# Patient Record
Sex: FEMALE | Race: WHITE | NOT HISPANIC OR LATINO | Employment: UNEMPLOYED | URBAN - METROPOLITAN AREA
[De-identification: names, ages, dates, MRNs, and addresses within clinical notes are randomized per-mention and may not be internally consistent; named-entity substitution may affect disease eponyms.]

---

## 2017-01-12 ENCOUNTER — OFFICE VISIT (OUTPATIENT)
Dept: FAMILY MEDICINE | Facility: CLINIC | Age: 62
End: 2017-01-12
Payer: COMMERCIAL

## 2017-01-12 VITALS
HEART RATE: 91 BPM | SYSTOLIC BLOOD PRESSURE: 120 MMHG | HEIGHT: 63 IN | RESPIRATION RATE: 18 BRPM | TEMPERATURE: 97.6 F | WEIGHT: 204 LBS | DIASTOLIC BLOOD PRESSURE: 78 MMHG | BODY MASS INDEX: 36.14 KG/M2 | OXYGEN SATURATION: 96 %

## 2017-01-12 DIAGNOSIS — I83.90 VARICOSE VEIN OF LEG: ICD-10-CM

## 2017-01-12 DIAGNOSIS — N39.3 FEMALE STRESS INCONTINENCE: ICD-10-CM

## 2017-01-12 DIAGNOSIS — N39.0 URINARY TRACT INFECTION WITHOUT HEMATURIA, SITE UNSPECIFIED: Primary | ICD-10-CM

## 2017-01-12 LAB
ALBUMIN UR-MCNC: NEGATIVE MG/DL
APPEARANCE UR: CLEAR
BACTERIA #/AREA URNS HPF: ABNORMAL /HPF
BILIRUB UR QL STRIP: NEGATIVE
COLOR UR AUTO: YELLOW
GLUCOSE UR STRIP-MCNC: NEGATIVE MG/DL
HGB UR QL STRIP: ABNORMAL
KETONES UR STRIP-MCNC: NEGATIVE MG/DL
LEUKOCYTE ESTERASE UR QL STRIP: ABNORMAL
MUCOUS THREADS #/AREA URNS LPF: PRESENT /LPF
NITRATE UR QL: NEGATIVE
NON-SQ EPI CELLS #/AREA URNS LPF: ABNORMAL /LPF
PH UR STRIP: 6.5 PH (ref 5–7)
RBC #/AREA URNS AUTO: ABNORMAL /HPF (ref 0–2)
SP GR UR STRIP: 1.02 (ref 1–1.03)
URN SPEC COLLECT METH UR: ABNORMAL
UROBILINOGEN UR STRIP-ACNC: 0.2 EU/DL (ref 0.2–1)
WBC #/AREA URNS AUTO: ABNORMAL /HPF (ref 0–2)

## 2017-01-12 PROCEDURE — 87086 URINE CULTURE/COLONY COUNT: CPT | Performed by: NURSE PRACTITIONER

## 2017-01-12 PROCEDURE — 81001 URINALYSIS AUTO W/SCOPE: CPT | Performed by: NURSE PRACTITIONER

## 2017-01-12 PROCEDURE — 87088 URINE BACTERIA CULTURE: CPT | Performed by: NURSE PRACTITIONER

## 2017-01-12 PROCEDURE — 99214 OFFICE O/P EST MOD 30 MIN: CPT | Performed by: NURSE PRACTITIONER

## 2017-01-12 PROCEDURE — 87186 SC STD MICRODIL/AGAR DIL: CPT | Performed by: NURSE PRACTITIONER

## 2017-01-12 RX ORDER — NITROFURANTOIN 25; 75 MG/1; MG/1
100 CAPSULE ORAL 2 TIMES DAILY
Qty: 14 CAPSULE | Refills: 0 | Status: SHIPPED | OUTPATIENT
Start: 2017-01-12 | End: 2017-04-11

## 2017-01-12 NOTE — NURSING NOTE
"Chief Complaint   Patient presents with     UTI     Skin Check     bump on calve and left calve swelling        Initial /88 mmHg  Pulse 91  Temp(Src) 97.6  F (36.4  C) (Oral)  Resp 18  Ht 5' 2.75\" (1.594 m)  Wt 204 lb (92.534 kg)  BMI 36.42 kg/m2  SpO2 96% Estimated body mass index is 36.42 kg/(m^2) as calculated from the following:    Height as of this encounter: 5' 2.75\" (1.594 m).    Weight as of this encounter: 204 lb (92.534 kg).  BP completed using cuff size: regular    Shannon Tello MA      "

## 2017-01-12 NOTE — PROGRESS NOTES
"  SUBJECTIVE:                                                    Hafsa Corona is a 61 year old female who presents to clinic today for the following health issues:      URINARY TRACT SYMPTOMS      Duration: ongoing     Description   pt felt she was on the verge of having a UTI in December. January 1-2 very intense: frequency, blood in urine, dysuria, incontinence. Pt self treated with emergency-C cranberry and hot water compress. Pt was also feeling very fatigue. Symptoms cleared up on Tuesday January 3 rd. A week later symptoms came back on Sunday 8 th. She self treated and she \"was fine\".     Intensity:  severe    Accompanying signs and symptoms:  Fever/chills: no   Flank pain YES- somewhat. Not sure if related   Nausea and vomiting: no, mild appetite decrease   Vaginal symptoms: none  Abdominal/Pelvic Pain: YES, urethra had burning sensation. Pt stopped wearing tight fitting pants.      History  History of frequent UTI's: YES- when she was young   History of kidney stones: no   Sexually Active: no   Possibility of pregnancy: No    Precipitating or alleviating factors: None    Therapies tried and outcome: Emergency-C Cranberry/polmigranete and warm water   Outcome: helped      She currently is not having any urinary symptoms with the exception of stress incontinence which is chronic.  It did worse recently when she was experiencing dysuria.    She has intermittent mild swelling in the area of varicose veins on her left lower leg.  She denies any pain.        Problem list and histories reviewed & adjusted, as indicated.  Additional history: as documented    Problem list, Medication list, Allergies, and Medical/Social/Surgical histories reviewed in [facility] and updated as appropriate.    ROS:  C: NEGATIVE for fever, chills, change in weight  E/M: NEGATIVE for ear, mouth and throat problems  R: NEGATIVE for significant cough or SOB  CV: NEGATIVE for chest pain, palpitations or peripheral edema  GI: NEGATIVE for nausea, " "abdominal pain, heartburn, or change in bowel habits  : see HPI  MUSCULOSKELETAL: NEGATIVE for significant arthralgias or myalgia    OBJECTIVE:                                                    /78 mmHg  Pulse 91  Temp(Src) 97.6  F (36.4  C) (Oral)  Resp 18  Ht 5' 2.75\" (1.594 m)  Wt 204 lb (92.534 kg)  BMI 36.42 kg/m2  SpO2 96%  Body mass index is 36.42 kg/(m^2).  GENERAL: healthy, alert and no distress  RESP: lungs clear to auscultation - no rales, rhonchi or wheezes  CV: regular rate and rhythm, normal S1 S2, no S3 or S4, no murmur, click or rub, no peripheral edema and peripheral pulses strong  MS: varicose veins left medial lower leg  SKIN: no suspicious lesions or rashes  NEURO: Normal strength and tone, mentation intact and speech normal    Diagnostic Test Results:  Results for orders placed or performed in visit on 01/12/17 (from the past 24 hour(s))   *UA reflex to Microscopic and Culture ([facility] (except [place] and [place])   Result Value Ref Range    Color Urine Yellow     Appearance Urine Clear     Glucose Urine Negative NEG mg/dL    Bilirubin Urine Negative NEG    Ketones Urine Negative NEG mg/dL    Specific Gravity Urine 1.025 1.003 - 1.035    Blood Urine Small (A) NEG    pH Urine 6.5 5.0 - 7.0 pH    Protein Albumin Urine Negative NEG mg/dL    Urobilinogen Urine 0.2 0.2 - 1.0 EU/dL    Nitrite Urine Negative NEG    Leukocyte Esterase Urine Moderate (A) NEG    Source Midstream Urine    Urine Microscopic   Result Value Ref Range    WBC Urine 25-50 (A) 0 - 2 /HPF    RBC Urine O - 2 0 - 2 /HPF    Squamous Epithelial /LPF Urine Moderate (A) FEW /LPF    Bacteria Urine Moderate (A) NEG /HPF    Mucous Urine Present (A) NEG /LPF        ASSESSMENT/PLAN:                                                            1. Urinary tract infection without hematuria, site unspecified  Will treat for UTI.  She would like to recheck, so will make a lab-only appointment in 2 " weeks for repeat UA.   - *UA reflex to Microscopic and Culture ([facility] (except [place] and [place])  - Urine Microscopic  - Urine Culture Aerobic Bacterial  - nitrofurantoin, macrocrystal-monohydrate, (MACROBID) 100 MG capsule; Take 1 capsule (100 mg) by mouth 2 times daily  Dispense: 14 capsule; Refill: 0  - *UA reflex to Microscopic; Future    2. Female stress incontinence  She is interested in treating this, will [facility] to [facility].     3. Varicose vein of leg  Reassured.  Discussed compression stockings.       Follow up in March for diabetes.     Morelia Ayon, ATTILA  [facility]

## 2017-01-17 LAB
BACTERIA SPEC CULT: ABNORMAL
MICRO REPORT STATUS: ABNORMAL
MICROORGANISM SPEC CULT: ABNORMAL
SPECIMEN SOURCE: ABNORMAL

## 2017-01-23 DIAGNOSIS — F32.9 MDD (MAJOR DEPRESSIVE DISORDER): Primary | ICD-10-CM

## 2017-01-23 DIAGNOSIS — N39.0 URINARY TRACT INFECTION WITHOUT HEMATURIA, SITE UNSPECIFIED: ICD-10-CM

## 2017-01-23 LAB
ALBUMIN UR-MCNC: NEGATIVE MG/DL
APPEARANCE UR: CLEAR
BILIRUB UR QL STRIP: NEGATIVE
COLOR UR AUTO: YELLOW
GLUCOSE UR STRIP-MCNC: NEGATIVE MG/DL
HGB UR QL STRIP: ABNORMAL
KETONES UR STRIP-MCNC: NEGATIVE MG/DL
LEUKOCYTE ESTERASE UR QL STRIP: NEGATIVE
NITRATE UR QL: NEGATIVE
NON-SQ EPI CELLS #/AREA URNS LPF: NORMAL /LPF
PH UR STRIP: 6.5 PH (ref 5–7)
RBC #/AREA URNS AUTO: NORMAL /HPF (ref 0–2)
SP GR UR STRIP: 1.02 (ref 1–1.03)
URN SPEC COLLECT METH UR: ABNORMAL
UROBILINOGEN UR STRIP-ACNC: 0.2 EU/DL (ref 0.2–1)
WBC #/AREA URNS AUTO: NORMAL /HPF (ref 0–2)

## 2017-01-23 PROCEDURE — 81001 URINALYSIS AUTO W/SCOPE: CPT | Performed by: NURSE PRACTITIONER

## 2017-01-23 PROCEDURE — 36415 COLL VENOUS BLD VENIPUNCTURE: CPT | Performed by: FAMILY MEDICINE

## 2017-01-23 PROCEDURE — 82306 VITAMIN D 25 HYDROXY: CPT | Performed by: FAMILY MEDICINE

## 2017-01-24 LAB — DEPRECATED CALCIDIOL+CALCIFEROL SERPL-MC: 55 UG/L (ref 20–75)

## 2017-02-09 DIAGNOSIS — I10 ESSENTIAL HYPERTENSION: Primary | ICD-10-CM

## 2017-02-10 RX ORDER — LISINOPRIL 10 MG/1
10 TABLET ORAL DAILY
Qty: 90 TABLET | Refills: 0 | Status: SHIPPED | OUTPATIENT
Start: 2017-02-10 | End: 2017-04-11

## 2017-02-27 DIAGNOSIS — E11.9 TYPE 2 DIABETES MELLITUS WITHOUT COMPLICATION (H): ICD-10-CM

## 2017-02-27 NOTE — TELEPHONE ENCOUNTER
METFORMIN 500 MG         Last Written Prescription Date: 2-4-16  Last Fill Quantity: 180, # refills: PRN  Last Office Visit with [facility], [facility] or [facility] prescribing provider:  1-12-17        BP Readings from Last 3 Encounters:   01/12/17 120/78   09/06/16 110/77   07/19/16 128/72     Lab Results   Component Value Date    MICROL 19 09/06/2016     No results found for: MICROALBUMIN  Creatinine   Date Value Ref Range Status   02/04/2016 0.76 0.52 - 1.04 mg/dL Final   ]  GFR Estimate   Date Value Ref Range Status   02/04/2016 77 >60 mL/min/1.7m2 Final     Comment:     Non  GFR Calc   06/01/2015 89 >60 mL/min/1.7m2 Final     Comment:     Non  GFR Calc   02/13/2014 >60 ml/min/1.73m2 Final     GFR Estimate If Black   Date Value Ref Range Status   02/04/2016 >90   GFR Calc   >60 mL/min/1.7m2 Final   06/01/2015 >90   GFR Calc   >60 mL/min/1.7m2 Final   02/13/2014 >60 ml/min/1.73m2 Final     Lab Results   Component Value Date    CHOL 169 09/06/2016     Lab Results   Component Value Date    HDL 59 09/06/2016     Lab Results   Component Value Date    LDL 78 09/06/2016     Lab Results   Component Value Date    TRIG 159 09/06/2016     Lab Results   Component Value Date    CHOLHDLRATIO 2.6 06/01/2015     Lab Results   Component Value Date    AST 20 02/04/2016     Lab Results   Component Value Date    ALT 16 02/04/2016     Lab Results   Component Value Date    A1C 6.2 09/06/2016    A1C 6.0 02/04/2016    A1C 6.2 06/01/2015    A1C 5.7 04/01/2013    A1C 5.9 07/13/2012     Potassium   Date Value Ref Range Status   02/04/2016 4.2 3.4 - 5.3 mmol/L Final

## 2017-02-28 RX ORDER — METFORMIN HCL 500 MG
TABLET, EXTENDED RELEASE 24 HR ORAL
Qty: 60 TABLET | Refills: 0 | Status: SHIPPED | OUTPATIENT
Start: 2017-02-28 | End: 2017-04-03

## 2017-03-01 RX ORDER — METFORMIN HCL 500 MG
TABLET, EXTENDED RELEASE 24 HR ORAL
Qty: 60 TABLET | Refills: 0 | Status: CANCELLED | OUTPATIENT
Start: 2017-03-01

## 2017-03-01 NOTE — TELEPHONE ENCOUNTER
Patient is requesting a 90 day supply.       Disp Refills Start End STALIN   metFORMIN (GLUCOPHAGE-XR) 500 MG 24 hr tablet 60 tablet 0 2/28/2017  No   Sig: Take two tabs by mouth once daily with evening meal.  PLEASE SCHEDULE AN APPOINTMENT TO RECEIVE FUTURE REFILLS [phone]       Last Office Visit with [facility], [facility] or [facility] prescribing provider:  1-12-17        BP Readings from Last 3 Encounters:   01/12/17 120/78   09/06/16 110/77   07/19/16 128/72     Lab Results   Component Value Date    MICROL 19 09/06/2016     No results found for: MICROALBUMIN  Creatinine   Date Value Ref Range Status   02/04/2016 0.76 0.52 - 1.04 mg/dL Final   ]  GFR Estimate   Date Value Ref Range Status   02/04/2016 77 >60 mL/min/1.7m2 Final     Comment:     Non  GFR Calc   06/01/2015 89 >60 mL/min/1.7m2 Final     Comment:     Non  GFR Calc   02/13/2014 >60 ml/min/1.73m2 Final     GFR Estimate If Black   Date Value Ref Range Status   02/04/2016 >90   GFR Calc   >60 mL/min/1.7m2 Final   06/01/2015 >90   GFR Calc   >60 mL/min/1.7m2 Final   02/13/2014 >60 ml/min/1.73m2 Final     Lab Results   Component Value Date    CHOL 169 09/06/2016     Lab Results   Component Value Date    HDL 59 09/06/2016     Lab Results   Component Value Date    LDL 78 09/06/2016     Lab Results   Component Value Date    TRIG 159 09/06/2016     Lab Results   Component Value Date    CHOLHDLRATIO 2.6 06/01/2015     Lab Results   Component Value Date    AST 20 02/04/2016     Lab Results   Component Value Date    ALT 16 02/04/2016     Lab Results   Component Value Date    A1C 6.2 09/06/2016    A1C 6.0 02/04/2016    A1C 6.2 06/01/2015    A1C 5.7 04/01/2013    A1C 5.9 07/13/2012     Potassium   Date Value Ref Range Status   02/04/2016 4.2 3.4 - 5.3 mmol/L Final

## 2017-03-01 NOTE — TELEPHONE ENCOUNTER
Medication is being filled for 1 time refill only due to:  Patient needs labs CMP. Future labs ordered CMP. Patient needs to be seen because F/U diabetes.     Signed Prescriptions:                        Disp   Refills    metFORMIN (GLUCOPHAGE-XR) 500 MG 24 hr tab*60 tab*0        Sig: Take two tabs by mouth once daily with evening meal.            PLEASE SCHEDULE AN APPOINTMENT TO RECEIVE FUTURE           REFILLS [phone]  Authorizing Provider: WHIT ACEVEDO  Ordering User: ZACK GARCIA      Routing to  Reception - due for diabetes follow up and labs    Thank you,  Zack Garcia RN

## 2017-03-01 NOTE — TELEPHONE ENCOUNTER
Left message on answering machine that medication was approved and sent to pharmacy and that an additional appointment must be scheduled prior to additional refills given.   Allyson Mcgraw

## 2017-03-30 NOTE — TELEPHONE ENCOUNTER
Routing refill request to provider for review/approval because:  Gege given x1 and patient did not follow up, please advise    Routing to covering providers.    Please sign if agree.    Thank you!  TIMUR UriarteN, RN

## 2017-03-30 NOTE — TELEPHONE ENCOUNTER
Reason for Call:  Medication or medication refill:    Do you use a [facility]?  Name of the pharmacy and phone number for the current request:  [facility] [address] - [phone]    Name of the medication requested: metFORMIN (GLUCOPHAGE-XR) 500 MG 24 hr tablet    Other request: Patient made an appointment today to see Melly on 4/10 for a med check. She is requesting for a refill to last her until that visit. Aware that she was suppose to follow up sooner. She has only 2 tablets left, please call her if you have any questions. Thank you!    Can we leave a detailed message on this number? YES    Phone number patient can be reached at: Home number on file [phone] (home)    Best Time: anytime    Call taken on 3/30/2017 at 12:22 PM by Freda Page

## 2017-04-03 DIAGNOSIS — E11.9 TYPE 2 DIABETES MELLITUS WITHOUT COMPLICATION, WITHOUT LONG-TERM CURRENT USE OF INSULIN (H): Primary | ICD-10-CM

## 2017-04-03 RX ORDER — METFORMIN HCL 500 MG
TABLET, EXTENDED RELEASE 24 HR ORAL
Qty: 30 TABLET | Refills: 0 | OUTPATIENT
Start: 2017-04-03

## 2017-04-03 RX ORDER — METFORMIN HCL 500 MG
TABLET, EXTENDED RELEASE 24 HR ORAL
Qty: 180 TABLET | Refills: 0 | Status: SHIPPED | OUTPATIENT
Start: 2017-04-03 | End: 2017-04-11

## 2017-04-03 NOTE — TELEPHONE ENCOUNTER
Patient called to see if she could get her refill of metformin for 30 days or if possible for 90 days. She is currently out of med, was unable to schedule prior to running out yesterday.  She is scheduled to be seen 4/11/17, she would prefer the 90 days due to medication co-pay.    Thank you!  Anusha

## 2017-04-11 ENCOUNTER — OFFICE VISIT (OUTPATIENT)
Dept: FAMILY MEDICINE | Facility: CLINIC | Age: 62
End: 2017-04-11
Payer: COMMERCIAL

## 2017-04-11 VITALS
DIASTOLIC BLOOD PRESSURE: 73 MMHG | RESPIRATION RATE: 18 BRPM | OXYGEN SATURATION: 98 % | HEART RATE: 86 BPM | BODY MASS INDEX: 37.14 KG/M2 | WEIGHT: 208 LBS | TEMPERATURE: 98.9 F | SYSTOLIC BLOOD PRESSURE: 111 MMHG

## 2017-04-11 DIAGNOSIS — F33.0 MAJOR DEPRESSIVE DISORDER, RECURRENT EPISODE, MILD (H): ICD-10-CM

## 2017-04-11 DIAGNOSIS — E66.01 MORBID OBESITY, UNSPECIFIED OBESITY TYPE (H): ICD-10-CM

## 2017-04-11 DIAGNOSIS — I10 ESSENTIAL HYPERTENSION: ICD-10-CM

## 2017-04-11 DIAGNOSIS — E78.5 HYPERLIPIDEMIA LDL GOAL <100: ICD-10-CM

## 2017-04-11 DIAGNOSIS — E11.9 TYPE 2 DIABETES MELLITUS WITHOUT COMPLICATION, WITHOUT LONG-TERM CURRENT USE OF INSULIN (H): Primary | ICD-10-CM

## 2017-04-11 LAB — HBA1C MFR BLD: 6 % (ref 4.3–6)

## 2017-04-11 PROCEDURE — 99207 C FOOT EXAM  NO CHARGE: CPT | Performed by: NURSE PRACTITIONER

## 2017-04-11 PROCEDURE — 36415 COLL VENOUS BLD VENIPUNCTURE: CPT | Performed by: NURSE PRACTITIONER

## 2017-04-11 PROCEDURE — 99214 OFFICE O/P EST MOD 30 MIN: CPT | Performed by: NURSE PRACTITIONER

## 2017-04-11 PROCEDURE — 80048 BASIC METABOLIC PNL TOTAL CA: CPT | Performed by: NURSE PRACTITIONER

## 2017-04-11 PROCEDURE — 83036 HEMOGLOBIN GLYCOSYLATED A1C: CPT | Performed by: NURSE PRACTITIONER

## 2017-04-11 RX ORDER — ATORVASTATIN CALCIUM 80 MG/1
80 TABLET, FILM COATED ORAL DAILY
Qty: 90 TABLET | Status: SHIPPED | OUTPATIENT
Start: 2017-04-11 | End: 2018-04-18

## 2017-04-11 RX ORDER — LISINOPRIL 10 MG/1
10 TABLET ORAL DAILY
Qty: 90 TABLET | Status: SHIPPED | OUTPATIENT
Start: 2017-04-11 | End: 2018-05-10

## 2017-04-11 RX ORDER — METFORMIN HCL 500 MG
TABLET, EXTENDED RELEASE 24 HR ORAL
Qty: 180 TABLET | Status: SHIPPED | OUTPATIENT
Start: 2017-04-11 | End: 2018-04-28

## 2017-04-11 NOTE — MR AVS SNAPSHOT
"              After Visit Summary   4/11/2017    Hafsa Corona    MRN: [id number]           Patient Information     Date Of Birth          1955        Visit Information        Provider Department      4/11/2017 10:30 AM Morelia Ayon NP [facility]        Today's Diagnoses     Type 2 diabetes mellitus without complication, without long-term current use of insulin (H)    -  1    Essential hypertension        Hyperlipidemia LDL goal <100           Follow-ups after your visit        Follow-up notes from your care team     Return in about 6 months (around 10/11/2017).      Who to contact     If you have questions or need follow up information about today's clinic visit or your schedule please contact [facility] directly at [phone].  Normal or non-critical lab and imaging results will be communicated to you by [employer], letter or phone within 4 business days after the clinic has received the results. If you do not hear from us within 7 days, please contact the clinic through [employer] or phone. If you have a critical or abnormal lab result, we will notify you by phone as soon as possible.  Submit refill requests through [employer] or call your pharmacy and they will forward the refill request to us. Please allow 3 business days for your refill to be completed.          Additional Information About Your Visit        MyChart Information     [employer] lets you send messages to your doctor, view your test results, renew your prescriptions, schedule appointments and more. To sign up, go to [email or web address] . Click on \"Log in\" on the left side of the screen, which will take you to the Welcome page. Then click on \"Sign up Now\" on the right side of the page.     You will be asked to enter the access code listed below, as well as some personal information. Please follow the directions to create your username and password.     Your access code is: [email or web address]  Expires: " 7/10/2017 11:13 AM     Your access code will  in 90 days. If you need help or a new code, please call your [facility] clinic or [phone].        Care EveryWhere ID     This is your Care EveryWhere ID. This could be used by other organizations to access your [facility] medical records  [id number]        Your Vitals Were     Pulse Temperature Respirations Pulse Oximetry BMI (Body Mass Index)       86 98.9  F (37.2  C) (Oral) 18 98% 37.14 kg/m2        Blood Pressure from Last 3 Encounters:   17 111/73   17 120/78   16 110/77    Weight from Last 3 Encounters:   17 208 lb (94.3 kg)   17 204 lb (92.5 kg)   16 203 lb (92.1 kg)              We Performed the Following     Basic metabolic panel     FOOT EXAM     Hemoglobin A1c          Where to get your medicines      These medications were sent to [id number] - [place], [place] - [address] AT [facility] [address]  [address], [place] [address]    Hours:  24-hours Phone:  [phone]     atorvastatin 80 MG tablet    lisinopril 10 MG tablet    metFORMIN 500 MG 24 hr tablet         Some of these will need a paper prescription and others can be bought over the counter.  Ask your nurse if you have questions.     Bring a paper prescription for each of these medications     order for DME          Primary Care Provider Office Phone # Fax #    Morelia Ayon [phone]       [facility] [address]  [facility] [address]        Thank you!     Thank you for choosing [facility]  for your care. Our goal is always to provide you with excellent care. Hearing back from our patients is one way we can continue to improve our services. Please take a few minutes to complete the written survey that you may receive in the mail after your visit with us. Thank you!             Your Updated Medication List - Protect others around you: Learn how to safely  use, store and throw away your medicines at www.disposemymeds.org.          This list is accurate as of: 4/11/17 11:13 AM.  Always use your most recent med list.                   Brand Name Dispense Instructions for use    albuterol 108 (90 BASE) MCG/ACT Inhaler    albuterol    1 Inhaler    Inhale 2 puffs into the lungs 4 times daily as needed for shortness of breath / dyspnea       amoxicillin-clavulanate 875-125 MG per tablet    AUGMENTIN    42 tablet    Take 1 tablet by mouth 2 times daily       * ASPIRIN NOT PRESCRIBED    INTENTIONAL    0 each    Reported on 4/11/2017       atorvastatin 80 MG tablet    LIPITOR    90 tablet    Take 1 tablet (80 mg) by mouth daily       CALCIUM-MAGNESIUM-VITAMIN D PO      Take 2 tablets by mouth daily       EPINEPHrine 0.3 MG/0.3ML injection     1 each    Inject 0.3 mLs (0.3 mg) into the muscle once as needed for anaphylaxis       escitalopram 20 MG tablet    LEXAPRO    30 tablet    Take 1 tablet (20 mg) by mouth daily       fluticasone 50 MCG/ACT spray    FLONASE    48 g    Spray 2 sprays into both nostrils daily       ibuprofen 200 MG tablet    ADVIL/MOTRIN     Take 400 mg by mouth every 4 hours as needed.       lisinopril 10 MG tablet    PRINIVIL/ZESTRIL    90 tablet    Take 1 tablet (10 mg) by mouth daily       metFORMIN 500 MG 24 hr tablet    GLUCOPHAGE-XR    180 tablet    Take two tabs by mouth once daily with evening meal.       MULTIVITAMIN PO      Daily.       order for DME     1 Units    Equipment being ordered: blood pressure monitor       PRO-BIOTIC BLEND PO      Take by mouth daily       * UNKNOWN MED DOSAGE      vitamin B 50       vitamin D 1000 UNITS capsule      Take 2,000 Units by mouth daily       VYVANSE 50 MG capsule   Generic drug:  lisdexamfetamine      Reported on 4/11/2017       * Notice:  This list has 2 medication(s) that are the same as other medications prescribed for you. Read the directions carefully, and ask your doctor or other care provider to  review them with you.

## 2017-04-11 NOTE — NURSING NOTE
"Chief Complaint   Patient presents with     Diabetes     Recheck Medication     Metformin      Leg Swelling     left leg       Initial /73  Pulse 86  Temp 98.9  F (37.2  C) (Oral)  Resp 18  Wt 208 lb (94.3 kg)  SpO2 98%  BMI 37.14 kg/m2 Estimated body mass index is 37.14 kg/(m^2) as calculated from the following:    Height as of 1/12/17: 5' 2.75\" (1.594 m).    Weight as of this encounter: 208 lb (94.3 kg).  Medication Reconciliation: complete     Shannon Tello MA      "

## 2017-04-11 NOTE — Clinical Note
Please abstract the following data from this visit with this patient into the appropriate field in Epic:  Eye exam with ophthalmology on this date: 7/2016 [facility]

## 2017-04-11 NOTE — LETTER
[facility]   [address], [place]  [address]  [phone]      April 13, 2017      Hafsa Corona  [address]  [place] [address]              Dear Ms. Corona,    Labs look excellent!    Results for orders placed or performed in visit on 04/11/17   Hemoglobin A1c   Result Value Ref Range    Hemoglobin A1C 6.0 4.3 - 6.0 %   Basic metabolic panel   Result Value Ref Range    Sodium 141 133 - 144 mmol/L    Potassium 4.1 3.4 - 5.3 mmol/L    Chloride 106 94 - 109 mmol/L    Carbon Dioxide 25 20 - 32 mmol/L    Anion Gap 10 3 - 14 mmol/L    Glucose 89 70 - 99 mg/dL    Urea Nitrogen 19 7 - 30 mg/dL    Creatinine 0.75 0.52 - 1.04 mg/dL    GFR Estimate 78 >60 mL/min/1.7m2    GFR Estimate If Black >90   GFR Calc   >60 mL/min/1.7m2    Calcium 9.0 8.5 - 10.1 mg/dL           Sincerely,    Morelia Ayon, ANN/nr

## 2017-04-11 NOTE — PROGRESS NOTES
SUBJECTIVE:                                                    Hafsa Corona is a 62 year old female who presents to clinic today for the following health issues:      Diabetes   Diabetes:     Frequency of checking blood sugars::  Not at all    Diabetic concerns::  None    Hypoglycemia symptoms::  None    Paraesthesia present::  No    Eye Exam in the last year::  Yes    07252016  History of Present Illness   Diabetes:     Frequency of checking blood sugars::  Not at all    Diabetic concerns::  None    Hypoglycemia symptoms::  None    Paraesthesia present::  No    Eye Exam in the last year::  Yes    07252016          Problem list and histories reviewed & adjusted, as indicated.  Additional history: as documented            ROS:  C: NEGATIVE for fever, chills, change in weight  E/M: NEGATIVE for ear, mouth and throat problems  R: NEGATIVE for significant cough or SOB  CV: NEGATIVE for chest pain, palpitations or peripheral edema  GI: NEGATIVE for nausea, abdominal pain, heartburn, or change in bowel habits  MUSCULOSKELETAL: NEGATIVE for significant arthralgias or myalgia  NEURO: NEGATIVE for weakness, dizziness or paresthesias  ENDOCRINE: NEGATIVE for temperature intolerance, skin/hair changes  PSYCHIATRIC: NEGATIVE for changes in mood or affect    OBJECTIVE:                                                    /73  Pulse 86  Temp 98.9  F (37.2  C) (Oral)  Resp 18  Wt 208 lb (94.3 kg)  SpO2 98%  BMI 37.14 kg/m2  Body mass index is 37.14 kg/(m^2).  GENERAL: healthy, alert and no distress  NECK: no adenopathy, no asymmetry, masses, or scars and thyroid normal to palpation  RESP: lungs clear to auscultation - no rales, rhonchi or wheezes  CV: regular rate and rhythm, normal S1 S2, no S3 or S4, no murmur, click or rub, no peripheral edema and peripheral pulses strong  ABDOMEN: soft, nontender, no hepatosplenomegaly, no masses and bowel sounds normal  MS: varicose veins LE bilaterally (L>R)  SKIN: no suspicious  lesions or rashes  NEURO: Normal strength and tone, mentation intact and speech normal         ASSESSMENT/PLAN:                                                            1. Type 2 diabetes mellitus without complication, without long-term current use of insulin (H)  At goal  The current medical regimen is effective;  continue present plan and medications.   Follow up in 6 months.   - Hemoglobin A1c  - Basic metabolic panel  - metFORMIN (GLUCOPHAGE-XR) 500 MG 24 hr tablet; Take two tabs by mouth once daily with evening meal.  Dispense: 180 tablet; Refill: PRN  - FOOT EXAM    2. Essential hypertension  At goal  The current medical regimen is effective;  continue present plan and medications.   - order for DME; Equipment being ordered: blood pressure monitor  Dispense: 1 Units; Refill: 0  - lisinopril (PRINIVIL/ZESTRIL) 10 MG tablet; Take 1 tablet (10 mg) by mouth daily  Dispense: 90 tablet; Refill: PRN    3. Hyperlipidemia LDL goal <100    - atorvastatin (LIPITOR) 80 MG tablet; Take 1 tablet (80 mg) by mouth daily  Dispense: 90 tablet; Refill: PRN    Obesity  Plan: Discussed diet and exercise.    Depression  Plan: stable  The current medical regimen is effective;  continue present plan and medications.     Morelia Ayon NP  [facility]

## 2017-04-12 LAB
ANION GAP SERPL CALCULATED.3IONS-SCNC: 10 MMOL/L (ref 3–14)
BUN SERPL-MCNC: 19 MG/DL (ref 7–30)
CALCIUM SERPL-MCNC: 9 MG/DL (ref 8.5–10.1)
CHLORIDE SERPL-SCNC: 106 MMOL/L (ref 94–109)
CO2 SERPL-SCNC: 25 MMOL/L (ref 20–32)
CREAT SERPL-MCNC: 0.75 MG/DL (ref 0.52–1.04)
GFR SERPL CREATININE-BSD FRML MDRD: 78 ML/MIN/1.7M2
GLUCOSE SERPL-MCNC: 89 MG/DL (ref 70–99)
POTASSIUM SERPL-SCNC: 4.1 MMOL/L (ref 3.4–5.3)
SODIUM SERPL-SCNC: 141 MMOL/L (ref 133–144)

## 2017-06-05 ENCOUNTER — ALLIED HEALTH/NURSE VISIT (OUTPATIENT)
Dept: NURSING | Facility: CLINIC | Age: 62
End: 2017-06-05
Payer: COMMERCIAL

## 2017-06-05 ENCOUNTER — TELEPHONE (OUTPATIENT)
Dept: FAMILY MEDICINE | Facility: CLINIC | Age: 62
End: 2017-06-05

## 2017-06-05 ENCOUNTER — OFFICE VISIT (OUTPATIENT)
Dept: FAMILY MEDICINE | Facility: CLINIC | Age: 62
End: 2017-06-05
Payer: COMMERCIAL

## 2017-06-05 VITALS
BODY MASS INDEX: 36.78 KG/M2 | DIASTOLIC BLOOD PRESSURE: 77 MMHG | WEIGHT: 206 LBS | TEMPERATURE: 98 F | HEART RATE: 82 BPM | OXYGEN SATURATION: 98 % | SYSTOLIC BLOOD PRESSURE: 109 MMHG

## 2017-06-05 DIAGNOSIS — I82.4Z2 ACUTE DEEP VEIN THROMBOSIS (DVT) OF DISTAL VEIN OF LEFT LOWER EXTREMITY (H): ICD-10-CM

## 2017-06-05 DIAGNOSIS — R60.0 LEG EDEMA, LEFT: Primary | ICD-10-CM

## 2017-06-05 DIAGNOSIS — I82.409 DVT (DEEP VENOUS THROMBOSIS) (H): Primary | ICD-10-CM

## 2017-06-05 DIAGNOSIS — L03.116 CELLULITIS OF LEFT LOWER EXTREMITY: ICD-10-CM

## 2017-06-05 LAB
BASOPHILS # BLD AUTO: 0 10E9/L (ref 0–0.2)
BASOPHILS NFR BLD AUTO: 0.4 %
DIFFERENTIAL METHOD BLD: ABNORMAL
EOSINOPHIL # BLD AUTO: 0.2 10E9/L (ref 0–0.7)
EOSINOPHIL NFR BLD AUTO: 2.7 %
ERYTHROCYTE [DISTWIDTH] IN BLOOD BY AUTOMATED COUNT: 14.1 % (ref 10–15)
ERYTHROCYTE [SEDIMENTATION RATE] IN BLOOD BY WESTERGREN METHOD: 25 MM/H (ref 0–30)
HCT VFR BLD AUTO: 33.2 % (ref 35–47)
HGB BLD-MCNC: 10.5 G/DL (ref 11.7–15.7)
LYMPHOCYTES # BLD AUTO: 1.9 10E9/L (ref 0.8–5.3)
LYMPHOCYTES NFR BLD AUTO: 28.4 %
MCH RBC QN AUTO: 28.2 PG (ref 26.5–33)
MCHC RBC AUTO-ENTMCNC: 31.6 G/DL (ref 31.5–36.5)
MCV RBC AUTO: 89 FL (ref 78–100)
MONOCYTES # BLD AUTO: 0.7 10E9/L (ref 0–1.3)
MONOCYTES NFR BLD AUTO: 10.7 %
NEUTROPHILS # BLD AUTO: 3.9 10E9/L (ref 1.6–8.3)
NEUTROPHILS NFR BLD AUTO: 57.8 %
PLATELET # BLD AUTO: 284 10E9/L (ref 150–450)
RBC # BLD AUTO: 3.73 10E12/L (ref 3.8–5.2)
WBC # BLD AUTO: 6.8 10E9/L (ref 4–11)

## 2017-06-05 PROCEDURE — 99215 OFFICE O/P EST HI 40 MIN: CPT | Performed by: NURSE PRACTITIONER

## 2017-06-05 PROCEDURE — 99207 ZZC NO CHARGE NURSE ONLY: CPT

## 2017-06-05 PROCEDURE — 85652 RBC SED RATE AUTOMATED: CPT | Performed by: NURSE PRACTITIONER

## 2017-06-05 PROCEDURE — 36415 COLL VENOUS BLD VENIPUNCTURE: CPT | Performed by: NURSE PRACTITIONER

## 2017-06-05 PROCEDURE — 85025 COMPLETE CBC W/AUTO DIFF WBC: CPT | Performed by: NURSE PRACTITIONER

## 2017-06-05 PROCEDURE — 80053 COMPREHEN METABOLIC PANEL: CPT | Performed by: NURSE PRACTITIONER

## 2017-06-05 RX ORDER — CEPHALEXIN 500 MG/1
500 CAPSULE ORAL 3 TIMES DAILY
Qty: 30 CAPSULE | Refills: 0 | Status: SHIPPED | OUTPATIENT
Start: 2017-06-05 | End: 2017-06-23

## 2017-06-05 RX ORDER — WARFARIN SODIUM 5 MG/1
5 TABLET ORAL DAILY
Qty: 30 TABLET | Refills: 0 | Status: SHIPPED | OUTPATIENT
Start: 2017-06-05 | End: 2017-07-01

## 2017-06-05 NOTE — MR AVS SNAPSHOT
After Visit Summary   6/5/2017    Hafsa Corona    MRN: [id number]           Patient Information     Date Of Birth          1955        Visit Information        Provider Department      6/5/2017 9:15 AM Morelia Ayon NP [facility]        Today's Diagnoses     Leg edema, left    -  1    Cellulitis of left lower extremity        Acute deep vein thrombosis (DVT) of distal vein of left lower extremity (H)          Care Instructions      Discharge Instructions for Cellulitis  You have been diagnosed with cellulitis. This is an infection in the deepest layer of the skin. In some cases, the infection also affects the muscle. Cellulitis is caused by bacteria. The bacteria can enter the body through broken skin. This can happen with a cut, scratch, animal bite, or an insect bite that has been scratched. You may have been treated in the hospital with antibiotics and fluids. You will likely be given a prescription for antibiotics to take at home. This sheet will help you take care of yourself at home.  Home Care  When you are home:    Take the prescribed antibiotic medication you are given as directed until it is gone. Take it even if you feel better. It treats the infection and stops it from returning. Not taking all of the medication can make future infections hard to treat.    Keep the infected area clean.    When possible, raise the infected area above the level of your heart. This helps keep swelling down.    Talk to your doctor if you are in pain. Ask what kind of over-the-counter medication you can take for pain.    Apply clean bandages as advised.    Take your temperature once a day for a week.    Wash your hands often to prevent spreading the infection.  In the future, wash your hands before and after you touch cuts, scratches, or bandages. This will help prevent infection.   When to Call Your Doctor  Call your doctor immediately if you have any of the  following:    Vomiting    Fever of100.4 F (38 C) or higher, or as directed by your health care provider    Shaking chills    Redness that gets worse in or around the infected area    Swelling of the infected area    Pain that gets worse in or around the infected area    Difficulty or pain when moving the joints above or below the infected area    Discharge or pus draining from the area     1069-1434 [employer]. [address] [place] [address]. All rights reserved. This information is not intended as a substitute for professional medical care. Always follow your healthcare professional's instructions.        Understanding Deep Vein Thrombosis  Deep vein thrombosis (DVT) is a condition with a blood clot or thrombus in a deep vein. A blood clot is most common in the leg, but can develop in a large vein deep inside the leg, arm, or other part of the body. Part of the clot called an embolus can separate from the vein. It may travel to the lungs and form a pulmonary embolus (PE). This can cut off the flow of blood. A PE is a medical emergency and may cause death. Health care providers use the term venous thromboembolism (VTE) to describe the 2 conditions, DVT and PE. They use the term VTE because the 2 conditions are very closely related. And, because their prevention and treatment are closely related.  Over time, a blood clot can also permanently damage veins. To protect your health, a blood clot must be treated right away.  How DVT develops  The deep veins of the legs are located in the muscles. These help carry blood from the legs to the heart. When leg muscles contract and relax, blood is squeezed through the veins back to the heart. One-way valves inside the veins help keep the blood moving in the right direction. When blood moves too slowly or not at all, it can pool in the veins. This makes a clot more likely to form.    Risk factors  Anyone can develop a blood clot. The following risk  factors make a blood more likely to happen:    Being inactive for a long period, such as when you re in the hospital, or traveling by plane or car    Injury to a vein from an accident, a broken bone, or surgery    Having blood clots in the past    Personal or family history of a blood-clotting disorder    Recent surgery    Cancer and certain cancer treatments    Smoking  Other factors can also put you at higher risk for a blood clot. They include:    Age over 60 years    Pregnancy    Taking birth control or hormone replacement    Having other vein problems    Being overweight  Common symptoms  A blood clot does not always cause obvious symptoms. If you do have symptoms, they usually occur suddenly. They may include:    Pain, especially deep in the muscle    Swelling    Aching or tenderness    Red or warm skin  Call your health care provider if you have these symptoms.  Symptoms of pulmonary embolism may include:    Trouble breathing    Fast heartbeat    Chest pain    Sweating    Coughing (may cough up blood)    Fainting  Call 911 if you have these symptoms.   If you take medicine to help prevent blood clots, you have an increased risk of bleeding. Call 911 if you have heavy or uncontrolled bleeding. Call your health care provider if you have other signs or symptoms of bleeding like blood in the urine, bleeding with bowel movements, or bleeding from the nose, gums, a cut, or vagina.  Diagnosing DVT  Diagnosis begins with thorough questions about your symptoms and medical history along with a physical exam.  Diagnostic tests include:    An imaging test called a duplex ultrasound. This test uses sound waves to create pictures of veins and blood flow.    Blood tests to check for clotting and other problems.  If your health care provider thinks you may have pulmonary embolism, additional testing will be done.  Treating DVT  Treating a blood clot may include:    Medicine to thin the blood and prevent pulmonary embolism  and other complications.    Staying in the hospital. This may or may not be necessary.    Surgery for some people, like those who cannot take blood-thinning medicines.  Preventing DVT  Many people who are in the hospital are at an increased risk of developing blood clots. So, preventing blood clots is an important part of in-hospital care. The care may include getting out of bed regularly, taking medicine, or using special therapies or devices. Other factors and conditions may increase the risk of blood clots. Review your risk with your health care provider.    2405-2205 [employer]. [address] [place] [address]. All rights reserved. This information is not intended as a substitute for professional medical care. Always follow your healthcare professional's instructions.                Follow-ups after your visit        Your next 10 appointments already scheduled     Jun 06, 2017  3:00 PM CDT   [facility]   [facility]    [address] [place] [id number] [phone]            Jun 07, 2017  1:00 PM CDT   SHORT with Morelia Ayon NP   [facility]    [address] [place] [id number] [phone]            Jun 09, 2017  2:30 PM CDT   Anticoagulation Visit with  [facility] CLINIC   [facility] ([address] [id number] [phone]              Who to contact     If you have questions or need follow up information about today's clinic visit or your schedule please contact [facility] directly at [phone].  Normal or non-critical lab and imaging results will be communicated to you by MyChart, letter or phone within 4 business days after the clinic has received the results. If you do not hear from us within 7 days, please contact the clinic  "through [employer] or phone. If you have a critical or abnormal lab result, we will notify you by phone as soon as possible.  Submit refill requests through [employer] or call your pharmacy and they will forward the refill request to us. Please allow 3 business days for your refill to be completed.          Additional Information About Your Visit        [employer] Information     [employer] lets you send messages to your doctor, view your test results, renew your prescriptions, schedule appointments and more. To sign up, go to [email or web address] . Click on \"Log in\" on the left side of the screen, which will take you to the Welcome page. Then click on \"Sign up Now\" on the right side of the page.     You will be asked to enter the access code listed below, as well as some personal information. Please follow the directions to create your username and password.     Your access code is: [email or web address]  Expires: 7/10/2017 11:13 AM     Your access code will  in 90 days. If you need help or a new code, please call your [place] clinic or [phone].        Care EveryWhere ID     This is your Care EveryWhere ID. This could be used by other organizations to access your [place] medical records  [id number]        Your Vitals Were     Pulse Temperature Pulse Oximetry BMI (Body Mass Index)          82 98  F (36.7  C) (Oral) 98% 36.78 kg/m2         Blood Pressure from Last 3 Encounters:   17 109/77   17 111/73   17 120/78    Weight from Last 3 Encounters:   17 206 lb (93.4 kg)   17 208 lb (94.3 kg)   17 204 lb (92.5 kg)              We Performed the Following     CBC with platelets differential     Comprehensive metabolic panel     ESR: Erythrocyte sedimentation rate          Today's Medication Changes          These changes are accurate as of: 17  4:17 PM.  If you have any questions, ask your nurse or doctor.               Start taking these medicines.        Dose/Directions    cephALEXin " 500 MG capsule   Commonly known as:  KEFLEX   Used for:  Cellulitis of left lower extremity   Started by:  Morelia Ayon NP        Dose:  500 mg   Take 1 capsule (500 mg) by mouth 3 times daily   Quantity:  30 capsule   Refills:  0       enoxaparin 120 MG/0.8ML injection   Commonly known as:  enoxaparin   Used for:  Acute deep vein thrombosis (DVT) of distal vein of left lower extremity (H)   Started by:  Morelia Ayon NP        Dose:  1 mg/kg   Inject 0.6 mLs (90 mg) Subcutaneous every 12 hours for 7 days   Quantity:  8.4 mL   Refills:  0       warfarin 5 MG tablet   Commonly known as:  COUMADIN   Used for:  Acute deep vein thrombosis (DVT) of distal vein of left lower extremity (H)   Started by:  Morelia Ayon NP        Dose:  5 mg   Take 1 tablet (5 mg) by mouth daily   Quantity:  30 tablet   Refills:  0            Where to get your medicines      These medications were sent to [id number] - [place], [place] - [address] [facility]  [place]  [address], [place] [address]    Hours:  24-hours Phone:  [phone]     cephALEXin 500 MG capsule    enoxaparin 120 MG/0.8ML injection    warfarin 5 MG tablet                Primary Care Provider Office Phone # Fax #    Morelia Ayon [phone]       [facility] [address] [address]  [facility] [address]        Thank you!     Thank you for choosing [facility]  for your care. Our goal is always to provide you with excellent care. Hearing back from our patients is one way we can continue to improve our services. Please take a few minutes to complete the written survey that you may receive in the mail after your visit with us. Thank you!             Your Updated Medication List - Protect others around you: Learn how to safely use, store and throw away your medicines at www.disposemymeds.org.          This list is accurate as of: 6/5/17  4:17 PM.  Always use your  most recent med list.                   Brand Name Dispense Instructions for use    albuterol 108 (90 BASE) MCG/ACT Inhaler    albuterol    1 Inhaler    Inhale 2 puffs into the lungs 4 times daily as needed for shortness of breath / dyspnea       amoxicillin-clavulanate 875-125 MG per tablet    AUGMENTIN    42 tablet    Take 1 tablet by mouth 2 times daily       * ASPIRIN NOT PRESCRIBED    INTENTIONAL    0 each    Reported on 4/11/2017       atorvastatin 80 MG tablet    LIPITOR    90 tablet    Take 1 tablet (80 mg) by mouth daily       CALCIUM-MAGNESIUM-VITAMIN D PO      Take 2 tablets by mouth daily       cephALEXin 500 MG capsule    KEFLEX    30 capsule    Take 1 capsule (500 mg) by mouth 3 times daily       DIGESTIVE ENZYME PO          enoxaparin 120 MG/0.8ML injection    enoxaparin    8.4 mL    Inject 0.6 mLs (90 mg) Subcutaneous every 12 hours for 7 days       EPINEPHrine 0.3 MG/0.3ML injection     1 each    Inject 0.3 mLs (0.3 mg) into the muscle once as needed for anaphylaxis       escitalopram 20 MG tablet    LEXAPRO    30 tablet    Take 1 tablet (20 mg) by mouth daily       fluticasone 50 MCG/ACT spray    FLONASE    48 g    Spray 2 sprays into both nostrils daily       ibuprofen 200 MG tablet    ADVIL/MOTRIN     Take 400 mg by mouth every 4 hours as needed.       lisinopril 10 MG tablet    PRINIVIL/ZESTRIL    90 tablet    Take 1 tablet (10 mg) by mouth daily       metFORMIN 500 MG 24 hr tablet    GLUCOPHAGE-XR    180 tablet    Take two tabs by mouth once daily with evening meal.       MULTIVITAMIN PO      Daily.       order for DME     1 Units    Equipment being ordered: blood pressure monitor       PRO-BIOTIC BLEND PO      Take by mouth daily       * UNKNOWN MED DOSAGE      vitamin B 50       vitamin D 1000 UNITS capsule      Take 2,000 Units by mouth daily       VYVANSE 50 MG capsule   Generic drug:  lisdexamfetamine      Reported on 4/11/2017       warfarin 5 MG tablet    COUMADIN    30 tablet    Take 1  tablet (5 mg) by mouth daily       * Notice:  This list has 2 medication(s) that are the same as other medications prescribed for you. Read the directions carefully, and ask your doctor or other care provider to review them with you.

## 2017-06-05 NOTE — LETTER
[facility]   [address], [place]  [address]  [phone]      June 8, 2017      Hafsa DESHPANDE Cj  [address]  [place] [address]              Dear Ms. Corona,    Hemoglobin is a little low.  I would like to recheck this in a month.    Results for orders placed or performed in visit on 06/05/17   Comprehensive metabolic panel   Result Value Ref Range    Sodium 142 133 - 144 mmol/L    Potassium 4.3 3.4 - 5.3 mmol/L    Chloride 110 (H) 94 - 109 mmol/L    Carbon Dioxide 24 20 - 32 mmol/L    Anion Gap 8 3 - 14 mmol/L    Glucose 111 (H) 70 - 99 mg/dL    Urea Nitrogen 23 7 - 30 mg/dL    Creatinine 0.72 0.52 - 1.04 mg/dL    GFR Estimate 81 >60 mL/min/1.7m2    GFR Estimate If Black >90   GFR Calc   >60 mL/min/1.7m2    Calcium 8.8 8.5 - 10.1 mg/dL    Bilirubin Total 0.3 0.2 - 1.3 mg/dL    Albumin 3.5 3.4 - 5.0 g/dL    Protein Total 6.9 6.8 - 8.8 g/dL    Alkaline Phosphatase 99 40 - 150 U/L    ALT 13 0 - 50 U/L    AST 16 0 - 45 U/L   CBC with platelets differential   Result Value Ref Range    WBC 6.8 4.0 - 11.0 10e9/L    RBC Count 3.73 (L) 3.8 - 5.2 10e12/L    Hemoglobin 10.5 (L) 11.7 - 15.7 g/dL    Hematocrit 33.2 (L) 35.0 - 47.0 %    MCV 89 78 - 100 fl    MCH 28.2 26.5 - 33.0 pg    MCHC 31.6 31.5 - 36.5 g/dL    RDW 14.1 10.0 - 15.0 %    Platelet Count 284 150 - 450 10e9/L    Diff Method Automated Method     % Neutrophils 57.8 %    % Lymphocytes 28.4 %    % Monocytes 10.7 %    % Eosinophils 2.7 %    % Basophils 0.4 %    Absolute Neutrophil 3.9 1.6 - 8.3 10e9/L    Absolute Lymphocytes 1.9 0.8 - 5.3 10e9/L    Absolute Monocytes 0.7 0.0 - 1.3 10e9/L    Absolute Eosinophils 0.2 0.0 - 0.7 10e9/L    Absolute Basophils 0.0 0.0 - 0.2 10e9/L   ESR: Erythrocyte sedimentation rate   Result Value Ref Range    Sed Rate 25 0 - 30 mm/h           Sincerely,    Morelia Ayon, CNP/nr

## 2017-06-05 NOTE — TELEPHONE ENCOUNTER
Pt c/o lower left shin issue.  Sx for 7 days.  Hot and swollen.  May have hit shin.  Made appt for [facility] today.  JOE Simental

## 2017-06-05 NOTE — NURSING NOTE
Pt ren is at [facility]- spoke with pharmacist at [facility] and they will do the teaching.orin wants labs and to speak with pt prior to her leaving, so pt brought back to lab and handed off to KARAN Mix.  Renate Cruz RN

## 2017-06-05 NOTE — PATIENT INSTRUCTIONS
Discharge Instructions for Cellulitis  You have been diagnosed with cellulitis. This is an infection in the deepest layer of the skin. In some cases, the infection also affects the muscle. Cellulitis is caused by bacteria. The bacteria can enter the body through broken skin. This can happen with a cut, scratch, animal bite, or an insect bite that has been scratched. You may have been treated in the hospital with antibiotics and fluids. You will likely be given a prescription for antibiotics to take at home. This sheet will help you take care of yourself at home.  Home Care  When you are home:    Take the prescribed antibiotic medication you are given as directed until it is gone. Take it even if you feel better. It treats the infection and stops it from returning. Not taking all of the medication can make future infections hard to treat.    Keep the infected area clean.    When possible, raise the infected area above the level of your heart. This helps keep swelling down.    Talk to your doctor if you are in pain. Ask what kind of over-the-counter medication you can take for pain.    Apply clean bandages as advised.    Take your temperature once a day for a week.    Wash your hands often to prevent spreading the infection.  In the future, wash your hands before and after you touch cuts, scratches, or bandages. This will help prevent infection.   When to Call Your Doctor  Call your doctor immediately if you have any of the following:    Vomiting    Fever of100.4 F (38 C) or higher, or as directed by your health care provider    Shaking chills    Redness that gets worse in or around the infected area    Swelling of the infected area    Pain that gets worse in or around the infected area    Difficulty or pain when moving the joints above or below the infected area    Discharge or pus draining from the area     0832-0515 [employer]. [address] [place] [address], [place] [address]. All rights reserved. This  information is not intended as a substitute for professional medical care. Always follow your healthcare professional's instructions.        Understanding Deep Vein Thrombosis  Deep vein thrombosis (DVT) is a condition with a blood clot or thrombus in a deep vein. A blood clot is most common in the leg, but can develop in a large vein deep inside the leg, arm, or other part of the body. Part of the clot called an embolus can separate from the vein. It may travel to the lungs and form a pulmonary embolus (PE). This can cut off the flow of blood. A PE is a medical emergency and may cause death. Health care providers use the term venous thromboembolism (VTE) to describe the 2 conditions, DVT and PE. They use the term VTE because the 2 conditions are very closely related. And, because their prevention and treatment are closely related.  Over time, a blood clot can also permanently damage veins. To protect your health, a blood clot must be treated right away.  How DVT develops  The deep veins of the legs are located in the muscles. These help carry blood from the legs to the heart. When leg muscles contract and relax, blood is squeezed through the veins back to the heart. One-way valves inside the veins help keep the blood moving in the right direction. When blood moves too slowly or not at all, it can pool in the veins. This makes a clot more likely to form.    Risk factors  Anyone can develop a blood clot. The following risk factors make a blood more likely to happen:    Being inactive for a long period, such as when you re in the hospital, or traveling by plane or car    Injury to a vein from an accident, a broken bone, or surgery    Having blood clots in the past    Personal or family history of a blood-clotting disorder    Recent surgery    Cancer and certain cancer treatments    Smoking  Other factors can also put you at higher risk for a blood clot. They include:    Age over 60 years    Pregnancy    Taking birth  control or hormone replacement    Having other vein problems    Being overweight  Common symptoms  A blood clot does not always cause obvious symptoms. If you do have symptoms, they usually occur suddenly. They may include:    Pain, especially deep in the muscle    Swelling    Aching or tenderness    Red or warm skin  Call your health care provider if you have these symptoms.  Symptoms of pulmonary embolism may include:    Trouble breathing    Fast heartbeat    Chest pain    Sweating    Coughing (may cough up blood)    Fainting  Call 911 if you have these symptoms.   If you take medicine to help prevent blood clots, you have an increased risk of bleeding. Call 911 if you have heavy or uncontrolled bleeding. Call your health care provider if you have other signs or symptoms of bleeding like blood in the urine, bleeding with bowel movements, or bleeding from the nose, gums, a cut, or vagina.  Diagnosing DVT  Diagnosis begins with thorough questions about your symptoms and medical history along with a physical exam.  Diagnostic tests include:    An imaging test called a duplex ultrasound. This test uses sound waves to create pictures of veins and blood flow.    Blood tests to check for clotting and other problems.  If your health care provider thinks you may have pulmonary embolism, additional testing will be done.  Treating DVT  Treating a blood clot may include:    Medicine to thin the blood and prevent pulmonary embolism and other complications.    Staying in the hospital. This may or may not be necessary.    Surgery for some people, like those who cannot take blood-thinning medicines.  Preventing DVT  Many people who are in the hospital are at an increased risk of developing blood clots. So, preventing blood clots is an important part of in-hospital care. The care may include getting out of bed regularly, taking medicine, or using special therapies or devices. Other factors and conditions may increase the risk of  blood clots. Review your risk with your health care provider.    1433-3803 [employer]. [address] [place] [address], [place] [address]. All rights reserved. This information is not intended as a substitute for professional medical care. Always follow your healthcare professional's instructions.

## 2017-06-05 NOTE — MR AVS SNAPSHOT
"              After Visit Summary   6/5/2017    Hafsa Corona    MRN: [id number]           Patient Information     Date Of Birth          1955        Visit Information        Provider Department      6/5/2017 3:00 PM HP RN/TRIAGE NURSE ONLY [facility]        Today's Diagnoses     DVT (deep venous thrombosis) (H)    -  1       Follow-ups after your visit        Your next 10 appointments already scheduled     Jun 07, 2017  1:00 PM CDT   SHORT with Morelia Ayon NP   [facility] ([facility])    [address] [place] [id number] [phone]              Who to contact     If you have questions or need follow up information about today's clinic visit or your schedule please contact [facility] directly at [phone].  Normal or non-critical lab and imaging results will be communicated to you by Ztoryhart, letter or phone within 4 business days after the clinic has received the results. If you do not hear from us within 7 days, please contact the clinic through MyChart or phone. If you have a critical or abnormal lab result, we will notify you by phone as soon as possible.  Submit refill requests through FloTime or call your pharmacy and they will forward the refill request to us. Please allow 3 business days for your refill to be completed.          Additional Information About Your Visit        MyChart Information     FloTime lets you send messages to your doctor, view your test results, renew your prescriptions, schedule appointments and more. To sign up, go to [email or web address] . Click on \"Log in\" on the left side of the screen, which will take you to the Welcome page. Then click on \"Sign up Now\" on the right side of the page.     You will be asked to enter the access code listed below, as well as some personal information. Please follow the directions to create your username and password.     Your access code is: " [email or web address]  Expires: 7/10/2017 11:13 AM     Your access code will  in 90 days. If you need help or a new code, please call your [facility] or [phone].        Care EveryWhere ID     This is your Care EveryWhere ID. This could be used by other organizations to access your [facility] medical records  [id number]         Blood Pressure from Last 3 Encounters:   17 109/77   17 111/73   17 120/78    Weight from Last 3 Encounters:   17 206 lb (93.4 kg)   17 208 lb (94.3 kg)   17 204 lb (92.5 kg)              Today, you had the following     No orders found for display         Today's Medication Changes          These changes are accurate as of: 17  3:59 PM.  If you have any questions, ask your nurse or doctor.               Start taking these medicines.        Dose/Directions    cephALEXin 500 MG capsule   Commonly known as:  KEFLEX   Used for:  Cellulitis of left lower extremity   Started by:  Morelia Ayon NP        Dose:  500 mg   Take 1 capsule (500 mg) by mouth 3 times daily   Quantity:  30 capsule   Refills:  0       enoxaparin 120 MG/0.8ML injection   Commonly known as:  enoxaparin   Used for:  Acute deep vein thrombosis (DVT) of distal vein of left lower extremity (H)   Started by:  Morelia Ayon NP        Dose:  1 mg/kg   Inject 0.6 mLs (90 mg) Subcutaneous every 12 hours for 7 days   Quantity:  8.4 mL   Refills:  0       warfarin 5 MG tablet   Commonly known as:  COUMADIN   Used for:  Acute deep vein thrombosis (DVT) of distal vein of left lower extremity (H)   Started by:  Morelia Ayon NP        Dose:  5 mg   Take 1 tablet (5 mg) by mouth daily   Quantity:  30 tablet   Refills:  0            Where to get your medicines      These medications were sent to [id number] - [place], [place] - [address] AT [facility] [address] [address]  [address], [place] [address]    Hours:  24-hours Phone:  [phone]      cephALEXin 500 MG capsule    enoxaparin 120 MG/0.8ML injection    warfarin 5 MG tablet                Primary Care Provider Office Phone # Fax #    Morelia HERRERA ATTILA Ayon [phone]       [facility] [address] [place] [facility] [address]        Thank you!     Thank you for choosing [facility]  for your care. Our goal is always to provide you with excellent care. Hearing back from our patients is one way we can continue to improve our services. Please take a few minutes to complete the written survey that you may receive in the mail after your visit with us. Thank you!             Your Updated Medication List - Protect others around you: Learn how to safely use, store and throw away your medicines at www.disposemymeds.org.          This list is accurate as of: 6/5/17  3:59 PM.  Always use your most recent med list.                   Brand Name Dispense Instructions for use    albuterol 108 (90 BASE) MCG/ACT Inhaler    albuterol    1 Inhaler    Inhale 2 puffs into the lungs 4 times daily as needed for shortness of breath / dyspnea       amoxicillin-clavulanate 875-125 MG per tablet    AUGMENTIN    42 tablet    Take 1 tablet by mouth 2 times daily       * ASPIRIN NOT PRESCRIBED    INTENTIONAL    0 each    Reported on 4/11/2017       atorvastatin 80 MG tablet    LIPITOR    90 tablet    Take 1 tablet (80 mg) by mouth daily       CALCIUM-MAGNESIUM-VITAMIN D PO      Take 2 tablets by mouth daily       cephALEXin 500 MG capsule    KEFLEX    30 capsule    Take 1 capsule (500 mg) by mouth 3 times daily       DIGESTIVE ENZYME PO          enoxaparin 120 MG/0.8ML injection    enoxaparin    8.4 mL    Inject 0.6 mLs (90 mg) Subcutaneous every 12 hours for 7 days       EPINEPHrine 0.3 MG/0.3ML injection     1 each    Inject 0.3 mLs (0.3 mg) into the muscle once as needed for anaphylaxis       escitalopram 20 MG tablet    LEXAPRO    30 tablet    Take 1 tablet (20 mg) by mouth daily        fluticasone 50 MCG/ACT spray    FLONASE    48 g    Spray 2 sprays into both nostrils daily       ibuprofen 200 MG tablet    ADVIL/MOTRIN     Take 400 mg by mouth every 4 hours as needed.       lisinopril 10 MG tablet    PRINIVIL/ZESTRIL    90 tablet    Take 1 tablet (10 mg) by mouth daily       metFORMIN 500 MG 24 hr tablet    GLUCOPHAGE-XR    180 tablet    Take two tabs by mouth once daily with evening meal.       MULTIVITAMIN PO      Daily.       order for DME     1 Units    Equipment being ordered: blood pressure monitor       PRO-BIOTIC BLEND PO      Take by mouth daily       * UNKNOWN MED DOSAGE      vitamin B 50       vitamin D 1000 UNITS capsule      Take 2,000 Units by mouth daily       VYVANSE 50 MG capsule   Generic drug:  lisdexamfetamine      Reported on 4/11/2017       warfarin 5 MG tablet    COUMADIN    30 tablet    Take 1 tablet (5 mg) by mouth daily       * Notice:  This list has 2 medication(s) that are the same as other medications prescribed for you. Read the directions carefully, and ask your doctor or other care provider to review them with you.

## 2017-06-05 NOTE — PROGRESS NOTES
SUBJECTIVE:                                                    Hafsa Corona is a 62 year old female who presents to clinic today for the following health issues:      Swelling left leg      Duration: Tuesday     Description (location/character/radiation): left shin     Intensity:  5/10    Accompanying signs and symptoms: red- hot and growing in size; no fevers    History (similar episodes/previous evaluation): None    Precipitating or alleviating factors: None    Therapies tried and outcome: taking Asprin every 4-6 hours     She denies any known injury.  She was sitting in one place working at her desk for 10 hours recently.  She has no history of DVT, is not on HRT, no recent travel, no history of cancer.          Problem list and histories reviewed & adjusted, as indicated.  Additional history: as documented    Patient Active Problem List   Diagnosis     Attention deficit disorder     PANIC DISORDER      VASOMOTOR RHINITIS     Chronic Maxillary Sinusitis     Tachycardia     Type 2 diabetes mellitus without complication (H)     HYPERLIPIDEMIA LDL GOAL <100     Allergic rhinitis due to other allergen     BMI > 35     Essential hypertension     Lumbago     Major depressive disorder, recurrent episode, mild (H)     Past Surgical History:   Procedure Laterality Date     SALPINGO OOPHORECTOMY,R/L/KAYY  2008    Salpingo Oophorectomy, RT     SURGICAL HISTORY OF -       facial surgery d/t fall in 1/2002     SURGICAL HISTORY OF -       1985 removal of breast cyst     SURGICAL HISTORY OF -   2008    endometrial ablation       Social History   Substance Use Topics     Smoking status: Never Smoker     Smokeless tobacco: Never Used     Alcohol use Yes      Comment: rarely     Family History   Problem Relation Age of Onset     C.A.D. Father      DIABETES Father      Hypertension Father      CEREBROVASCULAR DISEASE Father      Psychotic Disorder Father      Pancreatic Cancer Father      C.A.D. Mother      Hypertension Mother       Breast Cancer Mother      Psychotic Disorder Mother      Colon Cancer Mother      CANCER Mother      Bone cancer     Prostate Problems Brother      cleared      Psychotic Disorder Sister      x2     Neurologic Disorder Sister      Psychotic Disorder Brother      x2     Depression Brother      major depressive disorder and OCD     Anxiety Disorder Brother      MENTAL ILLNESS Brother      Psychotic Disorder Maternal Grandmother      ?     Psychotic Disorder Maternal Grandfather      ?     Psychotic Disorder Paternal Grandmother      Schizophernia     Psychotic Disorder Paternal Grandfather      ?     Respiratory Paternal Grandfather       of emphysema and smoking     Psychotic Disorder Sister      Other - See Comments Sister      small kidney stone      Cancer - colorectal No family hx of            Reviewed and updated as needed this visit by clinical staff       Reviewed and updated as needed this visit by Provider         ROS:  C: NEGATIVE for fever, chills, change in weight  INTEGUMENTARY/SKIN: see HPI  E/M: NEGATIVE for ear, mouth and throat problems  R: NEGATIVE for significant cough or SOB  CV: NEGATIVE for chest pain, palpitations or peripheral edema  GI: NEGATIVE for nausea, abdominal pain, heartburn, or change in bowel habits  MUSCULOSKELETAL: see HPI  NEURO: NEGATIVE for weakness, dizziness or paresthesias  ENDOCRINE: NEGATIVE for temperature intolerance, skin/hair changes  HEME/ALLERGY/IMMUNE: NEGATIVE for bleeding problems    OBJECTIVE:                                                    /77  Pulse 82  Temp 98  F (36.7  C) (Oral)  Wt 206 lb (93.4 kg)  SpO2 98%  BMI 36.78 kg/m2  Body mass index is 36.78 kg/(m^2).  GENERAL: healthy, alert and no distress  RESP: lungs clear to auscultation - no rales, rhonchi or wheezes  CV: regular rate and rhythm, normal S1 S2, no S3 or S4, no murmur, click or rub, no peripheral edema and peripheral pulses strong  MS: edema of the left lower leg from knee  to foot, no calf tenderness  SKIN: area of erythema and warmth left anterior lower leg measuring 14.5 cm X 11 cm, smaller area of induration         ASSESSMENT/PLAN:                                                            1. Leg edema, left  Differential includes DVT, cellulitis, venous insufficiency  - US Lower Extremity Venous Duplex Left; Future    2. Cellulitis of left lower extremity  Will start Keflex; schedule doppler US  Discussed elevating leg, using heat.  Area outlined in ink and discussed that she needs to follow up immediately if symptoms worsen (increased redness, fever) otherwise follow up in 2 days.   - cephALEXin (KEFLEX) 500 MG capsule; Take 1 capsule (500 mg) by mouth 3 times daily  Dispense: 30 capsule; Refill: 0    3. Acute deep vein thrombosis (DVT) of distal vein of left lower extremity (H)  We arranged for a doppler US today, radiologist called with results showing DVT within branches of the proximal left peroneal and posterior tibial  Veins.  I had her return to the clinic and will start Lovenox and Coumadin 5 mg daily.  Discussed the use and indication of this medication as well as potential side effects.   Schedule with INR RN tomorrow.    - Comprehensive metabolic panel  - CBC with platelets differential  - ESR: Erythrocyte sedimentation rate  - enoxaparin (ENOXAPARIN) 120 MG/0.8ML injection; Inject 0.6 mLs (90 mg) Subcutaneous every 12 hours for 7 days  Dispense: 8.4 mL; Refill: 0  - warfarin (COUMADIN) 5 MG tablet; Take 1 tablet (5 mg) by mouth daily  Dispense: 30 tablet; Refill: 0    More than 60 minutes spent with patient today, >50% in counseling and coordination of care regarding evaluation for leg swelling, scheduling of ultrasound, confirmation of DVT and starting treatment and care for DVT.     Morelia Ayon NP  [facility]

## 2017-06-05 NOTE — NURSING NOTE
"Chief Complaint   Patient presents with     Leg Swelling       Initial /77  Pulse 82  Temp 98  F (36.7  C) (Oral)  Wt 206 lb (93.4 kg)  SpO2 98%  BMI 36.78 kg/m2 Estimated body mass index is 36.78 kg/(m^2) as calculated from the following:    Height as of 1/12/17: 5' 2.75\" (1.594 m).    Weight as of this encounter: 206 lb (93.4 kg).  Medication Reconciliation: complete       Dominguez Blackburn MA       "

## 2017-06-06 ENCOUNTER — ANTICOAGULATION THERAPY VISIT (OUTPATIENT)
Dept: NURSING | Facility: CLINIC | Age: 62
End: 2017-06-06
Payer: COMMERCIAL

## 2017-06-06 DIAGNOSIS — I82.402 ACUTE DEEP VEIN THROMBOSIS (DVT) OF LEFT LOWER EXTREMITY, UNSPECIFIED VEIN (H): Primary | ICD-10-CM

## 2017-06-06 DIAGNOSIS — Z79.01 LONG-TERM (CURRENT) USE OF ANTICOAGULANTS: ICD-10-CM

## 2017-06-06 PROBLEM — I82.409 DEEP VEIN THROMBOSIS (DVT) (H): Status: ACTIVE | Noted: 2017-06-06

## 2017-06-06 LAB
ALBUMIN SERPL-MCNC: 3.5 G/DL (ref 3.4–5)
ALP SERPL-CCNC: 99 U/L (ref 40–150)
ALT SERPL W P-5'-P-CCNC: 13 U/L (ref 0–50)
ANION GAP SERPL CALCULATED.3IONS-SCNC: 8 MMOL/L (ref 3–14)
AST SERPL W P-5'-P-CCNC: 16 U/L (ref 0–45)
BILIRUB SERPL-MCNC: 0.3 MG/DL (ref 0.2–1.3)
BUN SERPL-MCNC: 23 MG/DL (ref 7–30)
CALCIUM SERPL-MCNC: 8.8 MG/DL (ref 8.5–10.1)
CHLORIDE SERPL-SCNC: 110 MMOL/L (ref 94–109)
CO2 SERPL-SCNC: 24 MMOL/L (ref 20–32)
CREAT SERPL-MCNC: 0.72 MG/DL (ref 0.52–1.04)
GFR SERPL CREATININE-BSD FRML MDRD: 81 ML/MIN/1.7M2
GLUCOSE SERPL-MCNC: 111 MG/DL (ref 70–99)
INR POINT OF CARE: 1 (ref 0.86–1.14)
POTASSIUM SERPL-SCNC: 4.3 MMOL/L (ref 3.4–5.3)
PROT SERPL-MCNC: 6.9 G/DL (ref 6.8–8.8)
SODIUM SERPL-SCNC: 142 MMOL/L (ref 133–144)

## 2017-06-06 PROCEDURE — 85610 PROTHROMBIN TIME: CPT | Mod: QW

## 2017-06-06 PROCEDURE — 36416 COLLJ CAPILLARY BLOOD SPEC: CPT

## 2017-06-06 PROCEDURE — 99207 ZZC NO CHARGE NURSE ONLY: CPT

## 2017-06-06 NOTE — PROGRESS NOTES
ANTICOAGULATION FOLLOW-UP CLINIC VISIT    Patient Name:  Hafsa Corona  Date:  6/6/2017  Contact Type:  Face to Face     Reviewed general AC therapy education. Warfarin guide to therapy, vitamin K information and medical card alert all provided to patient for review. Full education appt scheduled fro 6/14 d/t conflicting schedules.     SUBJECTIVE:     Patient Findings     Positives Inflammation (Left shin d/t DVT and/or cellulitis.), Antibiotic use or infection (Keflex 500 mg TID started 6/5. 10 day course.)           OBJECTIVE    INR Protime   Date Value Ref Range Status   06/06/2017 1.0 0.86 - 1.14 Final       ASSESSMENT / PLAN  INR assessment SUB    Recheck INR In: 3 DAYS    INR Location Clinic      Anticoagulation Summary as of 6/6/2017     INR goal 2.0-3.0   Today's INR 1.0!   Maintenance plan No maintenance plan   Full instructions 6/6: 10 mg; 6/7: 10 mg; 6/8: 5 mg; Otherwise No maintenance plan   Next INR check 6/9/2017   Target end date 9/5/2017    Indications   Long-term (current) use of anticoagulants [Z79.01] [Z79.01]  Deep vein thrombosis (DVT) (H) [I82.409] [I82.409]         Anticoagulation Episode Summary     INR check location     Preferred lab     Send INR reminders to  [facility] CL[facility]IC    Comments DVT within branches of the proximal left peroneal and posterior tibial veins. Duration: TBD by hematology. Erratic green eater. Depression/anxiety.      Anticoagulation Care Providers     Provider Role Specialty Phone number    Morelia Ayon, NP [facility] [phone]            See the Encounter Report to view Anticoagulation Flowsheet and Dosing Calendar (Go to Encounters tab in chart review, and find the Anticoagulation Therapy Visit)    Patient advised to take 10 mg today and tomorrow (d/t concern for splitting tablets in 1/2 for 7.5 mg dosing), then 5 mg on Thursday. Weekly dose will be at 30 mg. Patient understands to continue Lovenox BID until INR is >2.0 for two  consecutive checks. Start taking warfarin with evening pills.     Patient made aware if signs of clotting (pain, tenderness, swelling, or color change in any extremity) AND/OR bleeding occur (nosebleeds, bleeding gums, bruising, or blood in stool or urine) to notify provider & seek medical attention. If severe symptoms develop, such as major bleeding, chest pain, shortness of breath, fall, trauma or s/s of stroke, patient to call 911 immediately.     Ana Peoples RN

## 2017-06-06 NOTE — Clinical Note
Please sign all cued orders: compression stockings, INR referral and hematology referral (significant family clotting hx). Close encounter when done. Thanks!

## 2017-06-06 NOTE — MR AVS SNAPSHOT
Hafsa Corona   6/6/2017 3:00 PM   Anticoagulation Therapy Visit    Description:  62 year old female   Provider:   INR CLINIC   Department:  Hp Nurse           INR as of 6/6/2017     Today's INR 1.0!      Anticoagulation Summary as of 6/6/2017     INR goal 2.0-3.0   Today's INR 1.0!   Full instructions 6/6: 10 mg; 6/7: 10 mg; 6/8: 5 mg; Otherwise No maintenance plan   Next INR check 6/9/2017    Indications   Long-term (current) use of anticoagulants [Z79.01] [Z79.01]         Your next Anticoagulation Clinic appointment(s)     Jun 09, 2017  2:30 PM CDT   Anticoagulation Visit with  INR CLINIC   [facility] ([facility]    [address] [place] [id number] [phone]              Contact Numbers     [facility]  Please call [phone] to cancel and/or reschedule your appointment   Please call [phone] with any problems or questions regarding your therapy.        June 2017 Details    Sun Mon Tue Wed Thu Fri Sat         1               2               3                 4               5               6      10 mg   See details      7      10 mg         8      5 mg         9            10                 11               12               13               14               15               16               17                 18               19               20               21               22               23               24                 25               26               27               28               29               30                 Date Details   06/06 This INR check       Date of next INR:  6/9/2017         How to take your warfarin dose     To take:  5 mg Take 1 of the 5 mg tablets.    To take:  10 mg Take 2 of the 5 mg tablets.

## 2017-06-07 ENCOUNTER — TELEPHONE (OUTPATIENT)
Dept: FAMILY MEDICINE | Facility: CLINIC | Age: 62
End: 2017-06-07

## 2017-06-07 NOTE — TELEPHONE ENCOUNTER
Detailed message left on pt's personally identified phone that letter is here for pickup    Erin Orozco, RN, BSN

## 2017-06-07 NOTE — LETTER
[address] [address]  Phone: [phone]     June 7, 2017      Hafsa CHARLEE Corona  [address]  [place] [address]              To whom it may concern,    Hafsa E Cj was seen in our clinic 6/5 for medical illness . Anticipate return to work or school by 6/10/17      Sincerely,        Morelia Ayon NP/ Erin Orozco, RN, BSN

## 2017-06-07 NOTE — TELEPHONE ENCOUNTER
orin  Pt called in and is needing a letter to take today off of work. She kind of overdid it the last couple of days.  Her cellulitis is better, but she is feeling exhausted.   I thought it might be reasonable for her to take tomorrow and maybe even Friday off too, just to get that leg up and rest some.  Thoughts?  Please advise and i will write the letter.  She will pick it up at her INR appointment on Friday.    Thanks!     Renate Cruz RN

## 2017-06-09 ENCOUNTER — TELEPHONE (OUTPATIENT)
Dept: NURSING | Facility: CLINIC | Age: 62
End: 2017-06-09

## 2017-06-09 ENCOUNTER — ANTICOAGULATION THERAPY VISIT (OUTPATIENT)
Dept: NURSING | Facility: CLINIC | Age: 62
End: 2017-06-09
Payer: COMMERCIAL

## 2017-06-09 DIAGNOSIS — Z79.01 LONG-TERM (CURRENT) USE OF ANTICOAGULANTS: ICD-10-CM

## 2017-06-09 DIAGNOSIS — I82.402 ACUTE DEEP VEIN THROMBOSIS (DVT) OF LEFT LOWER EXTREMITY, UNSPECIFIED VEIN (H): ICD-10-CM

## 2017-06-09 LAB — INR POINT OF CARE: 3.5 (ref 0.86–1.14)

## 2017-06-09 PROCEDURE — 36416 COLLJ CAPILLARY BLOOD SPEC: CPT

## 2017-06-09 PROCEDURE — 85610 PROTHROMBIN TIME: CPT | Mod: QW

## 2017-06-09 NOTE — TELEPHONE ENCOUNTER
Patient newly on warfarin therapy for left leg DVT. Warfarin started Tuesday 6/5.   INR 1.0 on 6/6 and now 3.5 today. Please review ACC recommendations below and advise if you agree with plan.    -Reduce daily warfarin dose to 2.5 mg today.   -Administer tonight's dose of Lovenox 60 mg and then STOP one day early d/t supra-therapeutic INR (typically Lovenox is administered at a minimum of 5 days).   -Recheck INR tomorrow or Sunday (at the latest). On-call physician to advise dosing plan base on next INR level.    Ana Peoples, TIMURN, RN

## 2017-06-09 NOTE — PROGRESS NOTES
ANTICOAGULATION FOLLOW-UP CLINIC VISIT    Patient Name:  Hafsa Corona  Date:  6/9/2017  Contact Type:  Face to Face    SUBJECTIVE:     Patient Findings     Positives Initiation of therapy, Inflammation (Left leg DVT pain comes and goes. )    Comments Patient denies any s/s of bleeding. Significant stress and depression reported d/t new diagnosis.            OBJECTIVE    INR Protime   Date Value Ref Range Status   06/09/2017 3.5 (A) 0.86 - 1.14 Final       ASSESSMENT / PLAN  INR assessment SUPRA    Recheck INR In: 1 DAY    INR Location Clinic      Anticoagulation Summary as of 6/9/2017     INR goal 2.0-3.0   Today's INR 3.5!   Maintenance plan No maintenance plan   Full instructions 6/9: 2.5 mg; 6/10: 2.5 mg; 6/11: 2.5 mg; 6/12: 2.5 mg; 6/13: 2.5 mg; Otherwise No maintenance plan   Next INR check 6/14/2017   Target end date 9/5/2017    Indications   Long-term (current) use of anticoagulants [Z79.01] [Z79.01]  Deep vein thrombosis (DVT) (H) [I82.409] [I82.409]         Anticoagulation Episode Summary     INR check location     Preferred lab     Send INR reminders to  [facility] CLINIC    Comments DVT within branches of the proximal left peroneal and posterior tibial veins. Duration: TBD by hematology. Erratic green eater. Depression/anxiety.      Anticoagulation Care Providers     Provider Role Specialty Phone number    Morelia Ayon, NP [facility] [phone]            See the Encounter Report to view Anticoagulation Flowsheet and Dosing Calendar (Go to Encounters tab in chart review, and find the Anticoagulation Therapy Visit)    Per protocol, patient advised to decrease today's warfarin dose to 2.5 mg to account for supra-therapeutic INR level. Patient instructed to increase green intake today and tomorrow as well.     Recheck INR tomorrow at [facility] to ensure INR is not continuing to climb. Patient advised to STOP Lovenox tomorrow as it will almost be the fifth full day of Lovenox  administration (started on 6/5 pm). DOD at both  and  made aware of situation and requested to advise.    Dr. Reyez, on-call physician, for  Clinic this wkd made aware of plan for INR check tomorrow (or Sunday at the latest). Patient's number on file is her cell and verified approval for VM.    Patient made aware if signs of clotting (pain, tenderness, swelling, or color change in any extremity) AND/OR bleeding occur (nosebleeds, bleeding gums, bruising, or blood in stool or urine) to notify provider & seek medical attention. If severe symptoms develop, such as major bleeding, chest pain, shortness of breath, fall, trauma or s/s of stroke, patient to call 911 immediately.     Dosage adjustment made based on physician directed care plan.    Ana Peoples RN

## 2017-06-09 NOTE — MR AVS SNAPSHOT
Hafsa Corona   6/9/2017 2:30 PM   Anticoagulation Therapy Visit    Description:  62 year old female   Provider:   [facility]   Department:   Nurse           INR as of 6/9/2017     Today's INR 3.5!      Anticoagulation Summary as of 6/9/2017     INR goal 2.0-3.0   Today's INR 3.5!   Full instructions 6/9: 2.5 mg; 6/10: 2.5 mg; 6/11: 2.5 mg; 6/12: 2.5 mg; 6/13: 2.5 mg; Otherwise No maintenance plan   Next INR check 6/14/2017    Indications   Long-term (current) use of anticoagulants [Z79.01] [Z79.01]  Deep vein thrombosis (DVT) (H) [I82.409] [I82.409]         Instructions    Stop Lovenox tomorrow!    Please return to [facility] tomorrow any time between 8 am- 10 pm. In the morning would be best.  The on-call physician will call you once they have receive your INR level. If you have not heard from our team, please call [facility] #[phone].          Your next Anticoagulation Clinic appointment(s)     Jun 14, 2017  3:15 PM CDT   Anticoagulation Visit with  [facility]   [address] [address] [phone]            Jun 20, 2017  3:30 PM CDT   Anticoagulation Visit with  [facility]   [address] [address] [phone]              Contact Numbers     [facility]  Please call [phone] to cancel and/or reschedule your appointment   Please call [phone] with any problems or questions regarding your therapy.        June 2017 Details    Sun Mon Tue Wed Thu Fri Sat         1               2               3                 4               5               6               7               8               9      2.5 mg   See details      10      2.5 mg           11      2.5 mg         12      2.5 mg         13      2.5 mg         14            15               16               17                 18               19                20               21               22               23               24                 25               26               27               28               29               30                 Date Details   06/09 This INR check       Date of next INR:  6/14/2017         How to take your warfarin dose     To take:  2.5 mg Take 0.5 of a 5 mg tablet.

## 2017-06-09 NOTE — TELEPHONE ENCOUNTER
Thank you Dr. Wegener. FYI Dr. Dessie:  See patient's [facility] encounter notes from today and plan outlined below. Patient will get an INR draw tomorrow 6/10 or Sunday 6/11 at [facility]. Please watch for results & call patient with dosing plan on cell #[phone]. Warfarin dose was reduced to 2.5 mg daily (conservative measures d/t NEW START UP for DVT). Patient will require new dosing plan until recheck on 6/14. Patient given FNA number in case. Thank you for your help!!    Ana Peoples, TIMURN, RN

## 2017-06-09 NOTE — Clinical Note
FYI: See [facility] encounter notes. Patient will be getting an INR draw tomorrow or Sunday 6/11 at [facility]. Please watch for results. Warfarin was dropped to 2.5 mg daily. Please advised otherwise if INR rises or falls by recheck. Patient given FNA in case.

## 2017-06-09 NOTE — PATIENT INSTRUCTIONS
Stop Lovenox tomorrow!    Please return to [facility] tomorrow any time between 8 am- 10 pm. In the morning would be best.  The on-call physician will call you once they have receive your INR level. If you have not heard from our team, please call [facility] #[phone].

## 2017-06-10 ENCOUNTER — TELEPHONE (OUTPATIENT)
Dept: FAMILY MEDICINE | Facility: CLINIC | Age: 62
End: 2017-06-10

## 2017-06-10 DIAGNOSIS — I82.402 ACUTE DEEP VEIN THROMBOSIS (DVT) OF LEFT LOWER EXTREMITY, UNSPECIFIED VEIN (H): ICD-10-CM

## 2017-06-10 DIAGNOSIS — Z79.01 LONG-TERM (CURRENT) USE OF ANTICOAGULANTS: ICD-10-CM

## 2017-06-10 LAB — INR PPP: 3.8 (ref 0.86–1.14)

## 2017-06-10 PROCEDURE — 36416 COLLJ CAPILLARY BLOOD SPEC: CPT | Performed by: NURSE PRACTITIONER

## 2017-06-10 PROCEDURE — 85610 PROTHROMBIN TIME: CPT | Performed by: NURSE PRACTITIONER

## 2017-06-10 NOTE — TELEPHONE ENCOUNTER
On call provider  Results of INR 3.8 called to provider  Called patient-recent deep vein thrombosis managed by INR nurse, she is also on antibiotics for cellulitis, started coumadin Monday  Reports of getting 10mg for 2 days then decreased to 2.5mg then the last 2 days was on 5mg daily.  Yesterday inr was 3.4.  Advised 2.5mg for today and tomorrow as she is in antibiotics  Recheck on Monday with inr nurse.  Will forward to pcp  Kris Reyez D.O.

## 2017-06-12 ENCOUNTER — ANTICOAGULATION THERAPY VISIT (OUTPATIENT)
Dept: NURSING | Facility: CLINIC | Age: 62
End: 2017-06-12
Payer: COMMERCIAL

## 2017-06-12 DIAGNOSIS — Z79.01 LONG-TERM (CURRENT) USE OF ANTICOAGULANTS: ICD-10-CM

## 2017-06-12 DIAGNOSIS — I82.402 ACUTE DEEP VEIN THROMBOSIS (DVT) OF LEFT LOWER EXTREMITY, UNSPECIFIED VEIN (H): ICD-10-CM

## 2017-06-12 LAB — INR PPP: 2.6 (ref 0.86–1.14)

## 2017-06-12 PROCEDURE — 85610 PROTHROMBIN TIME: CPT | Performed by: NURSE PRACTITIONER

## 2017-06-12 PROCEDURE — 36416 COLLJ CAPILLARY BLOOD SPEC: CPT | Performed by: NURSE PRACTITIONER

## 2017-06-12 PROCEDURE — 99207 ZZC NO CHARGE NURSE ONLY: CPT | Performed by: NURSE PRACTITIONER

## 2017-06-12 NOTE — MR AVS SNAPSHOT
Hafsa Corona   6/12/2017   Anticoagulation Therapy Visit    Description:  62 year old female   Provider:  Morelia Ayon NP   Department:  Hp Nurse           INR as of 6/12/2017     Today's INR 2.60      Anticoagulation Summary as of 6/12/2017     INR goal 2.0-3.0   Today's INR 2.60   Full instructions 6/12: 7.5 mg; 6/13: 7.5 mg; Otherwise No maintenance plan   Next INR check 6/14/2017    Indications   Long-term (current) use of anticoagulants [Z79.01] [Z79.01]  Deep vein thrombosis (DVT) (H) [I82.409] [I82.409]         Your next Anticoagulation Clinic appointment(s)     Jun 14, 2017  3:15 PM CDT   Anticoagulation Visit with  [facility]   [address] [id number] [phone]            Jun 20, 2017  3:30 PM CDT   Anticoagulation Visit with  [facility]   [address] [address] [phone]              Contact Numbers     [facility]  Please call [phone] to cancel and/or reschedule your appointment   Please call [phone] with any problems or questions regarding your therapy.        June 2017 Details    Sun Mon Tue Wed Thu Fri Sat         1               2               3                 4               5               6               7               8               9               10                 11               12      7.5 mg   See details      13      7.5 mg         14            15               16               17                 18               19               20               21               22               23               24                 25               26               27               28               29               30                 Date Details   06/12 This INR check       Date of next INR:  6/14/2017         How to take your warfarin dose     To take:  7.5 mg Take 1.5 of the 5 mg  tablets.

## 2017-06-12 NOTE — PROGRESS NOTES
"  ANTICOAGULATION FOLLOW-UP CLINIC VISIT    Patient Name:  Hafsa Corona  Date:  6/12/2017  Contact Type:  Telephone    SUBJECTIVE:     Patient Findings     Positives Change in diet/appetite (Patient reports having some tuna salad with mercado and moderate amts of veggies over the weekend.), Initiation of therapy, Antibiotic use or infection (Keflex course will finish 6/15. Per micromedex: \"Concurrent use of CEPHALEXIN and WARFARIN may result in an increased risk of bleeding.\") *Weekend plan was carried about by pt and  on-call physician well.            OBJECTIVE    INR   Date Value Ref Range Status   06/12/2017 2.60 (H) 0.86 - 1.14 Final     Comment:     This test is intended for monitoring Coumadin therapy.  Results are not   accurate   in patients with prolonged INR due to factor deficiency.         ASSESSMENT / PLAN  INR assessment THER    Recheck INR In: 2 DAYS    INR Location Clinic      Anticoagulation Summary as of 6/12/2017     INR goal 2.0-3.0   Today's INR 2.60   Maintenance plan No maintenance plan   Full instructions 6/12: 7.5 mg; 6/13: 7.5 mg; Otherwise No maintenance plan   Next INR check 6/14/2017   Target end date 9/5/2017    Indications   Long-term (current) use of anticoagulants [Z79.01] [Z79.01]  Deep vein thrombosis (DVT) (H) [I82.409] [I82.409]         Anticoagulation Episode Summary     INR check location     Preferred lab     Send INR reminders to  [facility]    Comments DVT within branches of the proximal left peroneal and posterior tibial veins. Duration: TBD by hematology. Erratic green eater. Depression/anxiety.      Anticoagulation Care Providers     Provider Role Specialty Phone number    Morelia Ayon NP [facility] [phone]            See the Encounter Report to view Anticoagulation Flowsheet and Dosing Calendar (Go to Encounters tab in chart review, and find the Anticoagulation Therapy Visit)    Patient advised to resume 7.5 mg daily until 6/14 appt. " Weekly dose will remain at 37.5 mg.    Patient made aware if signs of clotting (pain, tenderness, swelling, or color change in any extremity) AND/OR bleeding occur (nosebleeds, bleeding gums, bruising, or blood in stool or urine) to notify provider & seek medical attention. If severe symptoms develop, such as major bleeding, chest pain, shortness of breath, fall, trauma or s/s of stroke, patient to call 911 immediately.     Ana Peoples RN

## 2017-06-14 ENCOUNTER — ANTICOAGULATION THERAPY VISIT (OUTPATIENT)
Dept: NURSING | Facility: CLINIC | Age: 62
End: 2017-06-14
Payer: COMMERCIAL

## 2017-06-14 DIAGNOSIS — Z79.01 LONG-TERM (CURRENT) USE OF ANTICOAGULANTS: ICD-10-CM

## 2017-06-14 DIAGNOSIS — I82.402 ACUTE DEEP VEIN THROMBOSIS (DVT) OF LEFT LOWER EXTREMITY, UNSPECIFIED VEIN (H): ICD-10-CM

## 2017-06-14 LAB — INR POINT OF CARE: 3.2 (ref 0.86–1.14)

## 2017-06-14 PROCEDURE — 36416 COLLJ CAPILLARY BLOOD SPEC: CPT

## 2017-06-14 PROCEDURE — 85610 PROTHROMBIN TIME: CPT | Mod: QW

## 2017-06-14 NOTE — MR AVS SNAPSHOT
Hafsa Corona   6/14/2017 3:15 PM   Anticoagulation Therapy Visit    Description:  62 year old female   Provider:   [facility]   Department:  Hp Nurse           INR as of 6/14/2017     Today's INR 3.2!      Anticoagulation Summary as of 6/14/2017     INR goal 2.0-3.0   Today's INR 3.2!   Full instructions 6/14: 5 mg; 6/15: 5 mg; 6/16: 5 mg; 6/17: 5 mg; 6/18: 5 mg; 6/19: 5 mg; Otherwise No maintenance plan   Next INR check 6/20/2017    Indications   Long-term (current) use of anticoagulants [Z79.01] [Z79.01]  Deep vein thrombosis (DVT) (H) [I82.409] [I82.409]         Your next Anticoagulation Clinic appointment(s)     Jun 20, 2017  3:30 PM CDT   Anticoagulation Visit with  [facility]   [facility] ([facility])    [address] [place] [address] [phone]              Contact Numbers     [facility]  Please call [phone] to cancel and/or reschedule your appointment   Please call [phone] with any problems or questions regarding your therapy.        June 2017 Details    Sun Mon Tue Wed Thu Fri Sat         1               2               3                 4               5               6               7               8               9               10                 11               12               13               14      5 mg   See details      15      5 mg         16      5 mg         17      5 mg           18      5 mg         19      5 mg         20            21               22               23               24                 25               26               27               28               29               30                 Date Details   06/14 This INR check       Date of next INR:  6/20/2017         How to take your warfarin dose     To take:  5 mg Take 1 of the 5 mg tablets.

## 2017-06-14 NOTE — PROGRESS NOTES
ANTICOAGULATION FOLLOW-UP CLINIC VISIT    Patient Name:  Hafsa Corona  Date:  6/14/2017  Contact Type:  Face to Face    SUBJECTIVE:     Patient Findings     Positives Inflammation (DVT extremitiy continues to be painful, swollen (level fluctuates). Patient advised to try heat compression for 10-15 min TID, frequent walks, elevate when resting & wear current compression stockings of 18 mm Hg until seen and evaluated by Hematologist.)    Comments No clear directions from hematology or PCP, if patient's best option is to wear 20-30 mm Hg, 30-40 mm Hg compression stockings or be referred to Lymphedema team. Patient will clarify at Hematology appt on 6/23.         Coumadin education was completed today.  Topics covered include:  -Introduction to coumadin  -Proper Administration  -INR Testing  -Sign/Symptoms of Bleeding  -Signs/Symptoms of Clot Formation or Stroke  -Dietary Intake of Vitamin K  -Drug Interactions  -Anticoagulation Identification (bracelet, necklace or wallet card)  -Future Surgery  -Effects of Alcohol, Tobacco, and Exercise on Coumadin    Coumadin Education Booklet and Coumadin Identification Wallet Card were given to the patient.  Spent over 45 min counseling patient on AC therapy. All questions answered to the best of my abilities.     OBJECTIVE    INR Protime   Date Value Ref Range Status   06/14/2017 3.2 (A) 0.86 - 1.14 Final       ASSESSMENT / PLAN  INR assessment THER    Recheck INR In: 6 DAYS    INR Location Clinic      Anticoagulation Summary as of 6/14/2017     INR goal 2.0-3.0   Today's INR 3.2!   Maintenance plan No maintenance plan   Full instructions 6/14: 5 mg; 6/15: 5 mg; 6/16: 5 mg; 6/17: 5 mg; 6/18: 5 mg; 6/19: 5 mg; Otherwise No maintenance plan   Next INR check 6/20/2017   Target end date 9/5/2017    Indications   Long-term (current) use of anticoagulants [Z79.01] [Z79.01]  Deep vein thrombosis (DVT) (H) [I82.409] [I82.409]         Anticoagulation Episode Summary     INR check  location     Preferred lab     Send INR reminders to [facility]    Comments DVT within branches of the proximal left peroneal and posterior tibial veins. Duration: TBD by hematology. Erratic green eater. Depression/anxiety.      Anticoagulation Care Providers     Provider Role Specialty Phone number    Morelia Ayon, NP [facility] [phone]            See the Encounter Report to view Anticoagulation Flowsheet and Dosing Calendar (Go to Encounters tab in chart review, and find the Anticoagulation Therapy Visit)    Patient advise to continue with current weekly dose of 37.5 mg. Recheck in 6 days to ensure INR stability.     Patient made aware if signs of clotting (pain, tenderness, swelling, or color change in any extremity) AND/OR bleeding occur (nosebleeds, bleeding gums, bruising, or blood in stool or urine) to notify provider & seek medical attention. If severe symptoms develop, such as major bleeding, chest pain, shortness of breath, fall, trauma or s/s of stroke, patient to call 911 immediately.     Dosage adjustment made based on physician directed care plan.    Ana Peoples RN

## 2017-06-20 ENCOUNTER — ANTICOAGULATION THERAPY VISIT (OUTPATIENT)
Dept: NURSING | Facility: CLINIC | Age: 62
End: 2017-06-20
Payer: COMMERCIAL

## 2017-06-20 DIAGNOSIS — Z79.01 LONG-TERM (CURRENT) USE OF ANTICOAGULANTS: ICD-10-CM

## 2017-06-20 DIAGNOSIS — I82.402 ACUTE DEEP VEIN THROMBOSIS (DVT) OF LEFT LOWER EXTREMITY, UNSPECIFIED VEIN (H): ICD-10-CM

## 2017-06-20 LAB — INR POINT OF CARE: 3.5 (ref 0.86–1.14)

## 2017-06-20 PROCEDURE — 36416 COLLJ CAPILLARY BLOOD SPEC: CPT

## 2017-06-20 PROCEDURE — 85610 PROTHROMBIN TIME: CPT | Mod: QW

## 2017-06-20 PROCEDURE — 99207 ZZC NO CHARGE NURSE ONLY: CPT

## 2017-06-20 NOTE — PROGRESS NOTES
ANTICOAGULATION FOLLOW-UP CLINIC VISIT    Patient Name:  Hafsa Corona  Date:  6/20/2017  Contact Type:  Face to Face    SUBJECTIVE:     Patient Findings     Positives Change in diet/appetite (Not eating any greens.), Initiation of therapy           OBJECTIVE    INR Protime   Date Value Ref Range Status   06/20/2017 3.5 (A) 0.86 - 1.14 Final       ASSESSMENT / PLAN  INR assessment SUPRA    Recheck INR In: 3 DAYS    INR Location Clinic      Anticoagulation Summary as of 6/20/2017     INR goal 2.0-3.0   Today's INR 3.5!   Maintenance plan No maintenance plan   Full instructions 6/20: 2.5 mg; 6/21: 5 mg; 6/22: 5 mg; Otherwise No maintenance plan   Next INR check 6/23/2017   Target end date 9/5/2017    Indications   Long-term (current) use of anticoagulants [Z79.01] [Z79.01]  Deep vein thrombosis (DVT) (H) [I82.409] [I82.409]         Anticoagulation Episode Summary     INR check location     Preferred lab     Send INR reminders to  [facility]    Comments DVT within branches of the proximal left peroneal and posterior tibial veins. Duration: TBD by hematology. Erratic green eater. Depression/anxiety.      Anticoagulation Care Providers     Provider Role Specialty Phone number    Morelia Ayon, NP [facility] [phone]            See the Encounter Report to view Anticoagulation Flowsheet and Dosing Calendar (Go to Encounters tab in chart review, and find the Anticoagulation Therapy Visit)    Per protocol, patient advised to decrease today's warfarin dose by 2.5 mg to account for supra-therapeutic INR level. Patient instructed to eat a small salad today and tomorrow as well. Recheck at upcoming Hematology appt to ensure INR stability. Weekly dose will be 32.5 mg.    Of note: Patient is very concerned with lowering the daily dose and having INR drop too much. Long discussion today regarding dosing plan and frequent monitoring in order to make adjustments as needed.    Patient made aware if  signs of clotting (pain, tenderness, swelling, or color change in any extremity) AND/OR bleeding occur (nosebleeds, bleeding gums, bruising, or blood in stool or urine) to notify provider & seek medical attention. If severe symptoms develop, such as major bleeding, chest pain, shortness of breath, fall, trauma or s/s of stroke, patient to call 911 immediately.     Ana Peoples RN

## 2017-06-20 NOTE — MR AVS SNAPSHOT
Hafsa Corona   6/20/2017 3:30 PM   Anticoagulation Therapy Visit    Description:  62 year old female   Provider:   [facility]   Department:  Hp Nurse           INR as of 6/20/2017     Today's INR 3.5!      Anticoagulation Summary as of 6/20/2017     INR goal 2.0-3.0   Today's INR 3.5!   Full instructions 6/20: 2.5 mg; 6/21: 5 mg; 6/22: 5 mg; Otherwise No maintenance plan   Next INR check 6/23/2017    Indications   Long-term (current) use of anticoagulants [Z79.01] [Z79.01]  Deep vein thrombosis (DVT) (H) [I82.409] [I82.409]         Instructions    Start eating a small salad 1-2 per week.          Contact Numbers     [facility]  Please call [phone] to cancel and/or reschedule your appointment   Please call [phone] with any problems or questions regarding your therapy.        June 2017 Details    Sun Mon Tue Wed Thu Fri Sat         1               2               3                 4               5               6               7               8               9               10                 11               12               13               14               15               16               17                 18               19               20      2.5 mg   See details      21      5 mg         22      5 mg         23            24                 25               26               27               28               29               30                 Date Details   06/20 This INR check       Date of next INR:  6/23/2017         How to take your warfarin dose     To take:  2.5 mg Take 0.5 of a 5 mg tablet.    To take:  5 mg Take 1 of the 5 mg tablets.

## 2017-06-23 ENCOUNTER — TELEPHONE (OUTPATIENT)
Dept: HEMATOLOGY | Facility: CLINIC | Age: 62
End: 2017-06-23

## 2017-06-23 ENCOUNTER — OFFICE VISIT (OUTPATIENT)
Dept: HEMATOLOGY | Facility: CLINIC | Age: 62
End: 2017-06-23
Attending: INTERNAL MEDICINE
Payer: COMMERCIAL

## 2017-06-23 ENCOUNTER — ANTICOAGULATION THERAPY VISIT (OUTPATIENT)
Dept: NURSING | Facility: CLINIC | Age: 62
End: 2017-06-23

## 2017-06-23 VITALS
HEART RATE: 91 BPM | HEIGHT: 64 IN | OXYGEN SATURATION: 96 % | SYSTOLIC BLOOD PRESSURE: 116 MMHG | DIASTOLIC BLOOD PRESSURE: 77 MMHG | TEMPERATURE: 98.3 F | WEIGHT: 209.4 LBS | BODY MASS INDEX: 35.75 KG/M2

## 2017-06-23 DIAGNOSIS — I82.402 ACUTE DEEP VEIN THROMBOSIS (DVT) OF LEFT LOWER EXTREMITY, UNSPECIFIED VEIN (H): ICD-10-CM

## 2017-06-23 DIAGNOSIS — Z79.01 LONG-TERM (CURRENT) USE OF ANTICOAGULANTS: ICD-10-CM

## 2017-06-23 DIAGNOSIS — E11.9 TYPE 2 DIABETES MELLITUS WITHOUT COMPLICATION (H): ICD-10-CM

## 2017-06-23 DIAGNOSIS — I10 ESSENTIAL HYPERTENSION: ICD-10-CM

## 2017-06-23 LAB
ALBUMIN SERPL-MCNC: 3.5 G/DL (ref 3.4–5)
ALP SERPL-CCNC: 100 U/L (ref 40–150)
ALT SERPL W P-5'-P-CCNC: 21 U/L (ref 0–50)
ANION GAP SERPL CALCULATED.3IONS-SCNC: 6 MMOL/L (ref 3–14)
AST SERPL W P-5'-P-CCNC: 19 U/L (ref 0–45)
BASOPHILS # BLD AUTO: 0.1 10E9/L (ref 0–0.2)
BASOPHILS NFR BLD AUTO: 0.8 %
BILIRUB SERPL-MCNC: 0.3 MG/DL (ref 0.2–1.3)
BUN SERPL-MCNC: 17 MG/DL (ref 7–30)
CALCIUM SERPL-MCNC: 8.8 MG/DL (ref 8.5–10.1)
CHLORIDE SERPL-SCNC: 106 MMOL/L (ref 94–109)
CO2 SERPL-SCNC: 27 MMOL/L (ref 20–32)
CREAT SERPL-MCNC: 0.72 MG/DL (ref 0.52–1.04)
DIFFERENTIAL METHOD BLD: ABNORMAL
EOSINOPHIL # BLD AUTO: 0.1 10E9/L (ref 0–0.7)
EOSINOPHIL NFR BLD AUTO: 1.9 %
ERYTHROCYTE [DISTWIDTH] IN BLOOD BY AUTOMATED COUNT: 13.9 % (ref 10–15)
FERRITIN SERPL-MCNC: 23 NG/ML (ref 8–252)
GFR SERPL CREATININE-BSD FRML MDRD: 81 ML/MIN/1.7M2
GLUCOSE SERPL-MCNC: 85 MG/DL (ref 70–99)
HCT VFR BLD AUTO: 34.3 % (ref 35–47)
HGB BLD-MCNC: 10.6 G/DL (ref 11.7–15.7)
IMM GRANULOCYTES # BLD: 0 10E9/L (ref 0–0.4)
IMM GRANULOCYTES NFR BLD: 0.3 %
INR PPP: 2.3 (ref 0.86–1.14)
LYMPHOCYTES # BLD AUTO: 2.3 10E9/L (ref 0.8–5.3)
LYMPHOCYTES NFR BLD AUTO: 36 %
MCH RBC QN AUTO: 27.2 PG (ref 26.5–33)
MCHC RBC AUTO-ENTMCNC: 30.9 G/DL (ref 31.5–36.5)
MCV RBC AUTO: 88 FL (ref 78–100)
MONOCYTES # BLD AUTO: 0.6 10E9/L (ref 0–1.3)
MONOCYTES NFR BLD AUTO: 9.9 %
NEUTROPHILS # BLD AUTO: 3.3 10E9/L (ref 1.6–8.3)
NEUTROPHILS NFR BLD AUTO: 51.1 %
NRBC # BLD AUTO: 0 10*3/UL
NRBC BLD AUTO-RTO: 0 /100
PLATELET # BLD AUTO: 372 10E9/L (ref 150–450)
POTASSIUM SERPL-SCNC: 4.3 MMOL/L (ref 3.4–5.3)
PROT SERPL-MCNC: 7.3 G/DL (ref 6.8–8.8)
RBC # BLD AUTO: 3.9 10E12/L (ref 3.8–5.2)
SODIUM SERPL-SCNC: 139 MMOL/L (ref 133–144)
WBC # BLD AUTO: 6.4 10E9/L (ref 4–11)

## 2017-06-23 PROCEDURE — 36415 COLL VENOUS BLD VENIPUNCTURE: CPT | Performed by: NURSE PRACTITIONER

## 2017-06-23 PROCEDURE — 80053 COMPREHEN METABOLIC PANEL: CPT | Performed by: NURSE PRACTITIONER

## 2017-06-23 PROCEDURE — 99212 OFFICE O/P EST SF 10 MIN: CPT

## 2017-06-23 PROCEDURE — 85610 PROTHROMBIN TIME: CPT | Performed by: NURSE PRACTITIONER

## 2017-06-23 PROCEDURE — 82728 ASSAY OF FERRITIN: CPT | Performed by: NURSE PRACTITIONER

## 2017-06-23 PROCEDURE — 99205 OFFICE O/P NEW HI 60 MIN: CPT | Performed by: INTERNAL MEDICINE

## 2017-06-23 PROCEDURE — 85025 COMPLETE CBC W/AUTO DIFF WBC: CPT | Performed by: NURSE PRACTITIONER

## 2017-06-23 ASSESSMENT — PAIN SCALES - GENERAL: PAINLEVEL: MILD PAIN (2)

## 2017-06-23 NOTE — MR AVS SNAPSHOT
Hafsa Corona   6/23/2017 2:45 PM   Anticoagulation Therapy Visit    Description:  62 year old female   Provider:   [facility]   Department:   Nurse           INR as of 6/23/2017     Today's INR 2.30      Anticoagulation Summary as of 6/23/2017     INR goal 2.0-3.0   Today's INR 2.30   Full instructions 6/23: Hold; Otherwise No maintenance plan   Next INR check     Indications   Long-term (current) use of anticoagulants [Z79.01] [Z79.01]  Deep vein thrombosis (DVT) (H) [I82.409] [I82.409]         Anticoagulation Episode Summary     Resolved date 6/23/2017    Resolved reason Changed Anticoagulant       Contact Numbers     [facility]  Please call [phone] to cancel and/or reschedule your appointment   Please call [phone] with any problems or questions regarding your therapy.

## 2017-06-23 NOTE — PROGRESS NOTES
Patient & Lola Angy f/u with [facility] team regarding anticoagulant change.   Patient wished all the best and encouraged to monitor for s/s of intolerance to new medication and/or s/s of bleeding closely. Ana Peoples, BSN, RN

## 2017-06-23 NOTE — MR AVS SNAPSHOT
After Visit Summary   6/23/2017    Hafsa Corona    MRN: [id number]           Patient Information     Date Of Birth          1955        Visit Information        Provider Department      6/23/2017 1:30 PM William Villarreal MD [facility]        Today's Diagnoses     Long-term (current) use of anticoagulants        Acute deep vein thrombosis (DVT) of left lower extremity, unspecified vein (H)          Care Instructions    1.  Get INR/ferretin today in lab (first floor).  2.  US in 3 months with hypercoag labs (stop Xarelto 1 week prior to labs)            Follow-ups after your visit        Your next 10 appointments already scheduled     Jun 30, 2017  3:30 PM CDT   Anticoagulation Visit with  [facility]R CLINIC   [facility] ([facility]    [address] [place] [id number] [phone]              Future tests that were ordered for you today     Open Future Orders        Priority Expected Expires Ordered    Protein S Antigen Free Routine 9/23/2017 6/23/2018 6/23/2017    Activated protein C resistance Routine 9/23/2017 6/23/2018 6/23/2017    Beta 2 Glycoprotein 1 Antibody IgG Routine 9/23/2017 6/23/2018 6/23/2017    Beta 2 Glycoprotein 1 Antibody IgM Routine 9/23/2017 6/23/2018 6/23/2017    Cardiolipin Carol IgG and IgM Routine 9/23/2017 6/23/2018 6/23/2017    Factor 2 and 5 mutation analysis Routine 9/23/2017 6/23/2018 6/23/2017    Lupus Anticoagulant Panel Routine 9/23/2017 6/23/2018 6/23/2017    Antithrombin III Routine 9/23/2017 6/23/2018 6/23/2017    Protein C chromogenic Routine 9/23/2017 6/23/2018 6/23/2017    Ferritin Routine  6/23/2018 6/23/2017    *CBC with platelets differential Routine  6/23/2018 6/23/2017            Who to contact     If you have questions or need follow up information about today's clinic visit or your schedule please contact [facility] directly at [phone].  Normal or non-critical  "lab and imaging results will be communicated to you by MyChart, letter or phone within 4 business days after the clinic has received the results. If you do not hear from us within 7 days, please contact the clinic through [employer] or phone. If you have a critical or abnormal lab result, we will notify you by phone as soon as possible.  Submit refill requests through [employer] or call your pharmacy and they will forward the refill request to us. Please allow 3 business days for your refill to be completed.          Additional Information About Your Visit        [employer] Information     [employer] lets you send messages to your doctor, view your test results, renew your prescriptions, schedule appointments and more. To sign up, go to [email or web address] . Click on \"Log in\" on the left side of the screen, which will take you to the Welcome page. Then click on \"Sign up Now\" on the right side of the page.     You will be asked to enter the access code listed below, as well as some personal information. Please follow the directions to create your username and password.     Your access code is: [email or web address]  Expires: 7/10/2017 11:13 AM     Your access code will  in 90 days. If you need help or a new code, please call your [place] clinic or [phone].        Care EveryWhere ID     This is your Care EveryWhere ID. This could be used by other organizations to access your [place] medical records  [id number]        Your Vitals Were     Pulse Temperature Height Pulse Oximetry BMI (Body Mass Index)       91 98.3  F (36.8  C) (Oral) 1.613 m (5' 3.5\") 96% 36.51 kg/m2        Blood Pressure from Last 3 Encounters:   17 116/77   17 109/77   17 111/73    Weight from Last 3 Encounters:   17 95 kg (209 lb 6.4 oz)   17 93.4 kg (206 lb)   17 94.3 kg (208 lb)               Primary Care Provider Office Phone # Fax #    Morelia Ayon [phone]       [facility] " [address]  [facility] [address]        Equal Access to Services     LOBHUPINDER MANOJ : Hadii aad ku hadterecortez Soaxel, wagabrielleda luqadaha, qalata delphinexochiltfrancesca palm, sujatha cavazosgeorgieken friedman. So [facility] [phone].    ATENCIÓN: Si habla español, tiene a martínez disposición servicios gratuitos de asistencia lingüística. Llame al [phone].    We comply with applicable federal civil rights laws and [place] laws. We do not discriminate on the basis of race, color, national origin, age, disability sex, sexual orientation or gender identity.            Thank you!     Thank you for choosing [facility]  for your care. Our goal is always to provide you with excellent care. Hearing back from our patients is one way we can continue to improve our services. Please take a few minutes to complete the written survey that you may receive in the mail after your visit with us. Thank you!             Your Updated Medication List - Protect others around you: Learn how to safely use, store and throw away your medicines at www.disposemymeds.org.          This list is accurate as of: 6/23/17  2:50 PM.  Always use your most recent med list.                   Brand Name Dispense Instructions for use Diagnosis    albuterol 108 (90 BASE) MCG/ACT Inhaler    albuterol    1 Inhaler    Inhale 2 puffs into the lungs 4 times daily as needed for shortness of breath / dyspnea    Cough       amoxicillin-clavulanate 875-125 MG per tablet    AUGMENTIN    42 tablet    Take 1 tablet by mouth 2 times daily    Chronic maxillary sinusitis       * ASPIRIN NOT PRESCRIBED    INTENTIONAL    0 each    Reported on 4/11/2017        atorvastatin 80 MG tablet    LIPITOR    90 tablet    Take 1 tablet (80 mg) by mouth daily    Hyperlipidemia LDL goal <100       CALCIUM-MAGNESIUM-VITAMIN D PO      Take 2 tablets by mouth daily        DIGESTIVE ENZYME PO      Take by mouth as needed        EPINEPHrine 0.3 MG/0.3ML injection     1  each    Inject 0.3 mLs (0.3 mg) into the muscle once as needed for anaphylaxis    Bee allergy status       escitalopram 20 MG tablet    LEXAPRO    30 tablet    Take 1 tablet (20 mg) by mouth daily    Major depressive disorder, recurrent episode, mild (H)       fluticasone 50 MCG/ACT spray    FLONASE    48 g    Spray 2 sprays into both nostrils daily    Chronic maxillary sinusitis       ibuprofen 200 MG tablet    ADVIL/MOTRIN     Take 400 mg by mouth every 4 hours as needed.        lisinopril 10 MG tablet    PRINIVIL/ZESTRIL    90 tablet    Take 1 tablet (10 mg) by mouth daily    Essential hypertension       metFORMIN 500 MG 24 hr tablet    GLUCOPHAGE-XR    180 tablet    Take two tabs by mouth once daily with evening meal.    Type 2 diabetes mellitus without complication, without long-term current use of insulin (H)       MULTIVITAMIN PO      Daily.        order for DME     1 Units    Equipment being ordered: blood pressure monitor    Essential hypertension       order for DME     1 each    Compression stockings 20-30 mm Hg. To be wear when directed by Hematology team and while awake for the first two years post clot.    Long-term (current) use of anticoagulants, Acute deep vein thrombosis (DVT) of left lower extremity, unspecified vein (H)       PRO-BIOTIC BLEND PO      Take 1 capsule by mouth daily Enzymatic Therapy-probiotic pearls        * UNKNOWN MED DOSAGE      vitamin B 50        vitamin D 1000 UNITS capsule      Take 2,000 Units by mouth daily        VYVANSE 50 MG capsule   Generic drug:  lisdexamfetamine      Reported on 4/11/2017        warfarin 5 MG tablet    COUMADIN    30 tablet    Take 1 tablet (5 mg) by mouth daily    Acute deep vein thrombosis (DVT) of distal vein of left lower extremity (H)       * Notice:  This list has 2 medication(s) that are the same as other medications prescribed for you. Read the directions carefully, and ask your doctor or other care provider to review them with you.

## 2017-06-23 NOTE — LETTER
2017       RE: Hafsa Corona  [address]  [place] [address]     Dear Colleague,    Thank you for referring your patient, Hafsa Corona, to the [facility] at [facility]. Please see a copy of my visit note below.        [facility]  [address] [facility] [address], [place] [address]  Phone: [phone], Fax: [phone].    Outpatient Visit Note:    Patient: Hafsa Corona  MRN: [id number]  : 1955  CLIFF: 2017    Reason for Consultation:  Distal DVT    History of Present Illness:  Hafsa Corona  is a 62 year old woman referred by Pema Ayon NP for evaluation and treatment of a distal DVT. Hafsa reports no history of overseas travel, surgery or hospitalization leading up to her presentation on 2017 to her PCP with complaints of left lower extremity swelling and pain.  She was treated empirically for cellulitis and an ultrasound the same day showed a DVT of the proximal left peroneal and posterior tibial veins.  She was treated with LMWH and transitioned to warfarin, which she is currently on.  She reports no bleeding on the medication.  She notes that she has chronic fatigue for at least the last year which did also seem worse on warfarin.    Past Medical History:  Past Medical History:   Diagnosis Date     Anxiety state, unspecified     [id number]     Attention deficit disorder without mention of hyperactivity      Chronic rhinitis      Depressive disorder, not elsewhere classified      Panic disorder without agoraphobia      Pure hypercholesterolemia     Lipitor     Tachycardia, unspecified     1999-2004     Type II or unspecified type diabetes mellitus without mention of complication, not stated as uncontrolled     diagnosed        Past Surgical History:  Past Surgical History:   Procedure Laterality Date     SALPINGO OOPHORECTOMY,R/L/KAYY      Salpingo Oophorectomy, RT      SURGICAL HISTORY OF -       facial surgery d/t fall in 1/2002     SURGICAL HISTORY OF -       1985 removal of breast cyst     SURGICAL HISTORY OF -   2008    endometrial ablation       Medications:  Current Outpatient Prescriptions   Medication Sig Dispense Refill     order for DME Compression stockings 20-30 mm Hg. To be wear when directed by Hematology team and while awake for the first two years post clot. 1 each 0     Digestive Enzymes (DIGESTIVE ENZYME PO) Take by mouth as needed        warfarin (COUMADIN) 5 MG tablet Take 1 tablet (5 mg) by mouth daily 30 tablet 0     CALCIUM-MAGNESIUM-VITAMIN D PO Take 2 tablets by mouth daily       Probiotic Product (PRO-BIOTIC BLEND PO) Take 1 capsule by mouth daily Enzymatic Therapy-probiotic pearls       atorvastatin (LIPITOR) 80 MG tablet Take 1 tablet (80 mg) by mouth daily 90 tablet PRN     order for DME Equipment being ordered: blood pressure monitor 1 Units 0     lisinopril (PRINIVIL/ZESTRIL) 10 MG tablet Take 1 tablet (10 mg) by mouth daily 90 tablet PRN     metFORMIN (GLUCOPHAGE-XR) 500 MG 24 hr tablet Take two tabs by mouth once daily with evening meal. 180 tablet PRN     escitalopram (LEXAPRO) 20 MG tablet Take 1 tablet (20 mg) by mouth daily 30 tablet PRN     VYVANSE 50 MG capsule Reported on 4/11/2017  0     EPINEPHrine (EPIPEN) 0.3 MG/0.3ML injection Inject 0.3 mLs (0.3 mg) into the muscle once as needed for anaphylaxis 1 each PRN     albuterol (ALBUTEROL) 108 (90 BASE) MCG/ACT inhaler Inhale 2 puffs into the lungs 4 times daily as needed for shortness of breath / dyspnea 1 Inhaler PRN     fluticasone (FLONASE) 50 MCG/ACT nasal spray Spray 2 sprays into both nostrils daily 48 g 1     ASPIRIN NOT PRESCRIBED, INTENTIONAL, Reported on 4/11/2017 0 each 0     Cholecalciferol (VITAMIN D) 1000 UNIT capsule Take 2,000 Units by mouth daily        ibuprofen (ADVIL,MOTRIN) 200 MG tablet Take 400 mg by mouth every 4 hours as needed.       UNKNOWN MED DOSAGE vitamin B  50       MULTIVITAMIN OR Daily.       amoxicillin-clavulanate (AUGMENTIN) 875-125 MG per tablet Take 1 tablet by mouth 2 times daily (Patient not taking: Reported on 2017) 42 tablet 4        Allergies:  Allergies   Allergen Reactions     Sulfa Drugs      Wasps [Hornets]        ROS:  Denies any bleeding issues. No gum bleeding, No nose bleed. Denies any hematuria or blood in stools. Denies any ecchymosis. Denies any lower extremities swelling or pain. Denies any fever, no chest pain. Denies any shortness of breath.    Social History:  Denies any tobacco use. No significant alcohol use. Denies any illicit drug use. Patient works as an employment counselor.    Family History:  Father  from complications from stroke, was on warfarin.  An aunt had a DVT and PE, likely due to cancer.    Objectives:  Pleasant 62 year old female in no acute distress.  Vitals: B/P: 116/77, T: 98.3, P: 91, R: Data Unavailable, Wt: 209 lbs 6.4 oz  Body mass index is 36.51 kg/(m^2).   Exam:   Gen: Appears well, no distress  HEENT: No scleral icterus or hemorrahge, no wet purpura, no lymphadenopathy  Ext: right leg is visibly larger than the left.  She has varicocities on both legs that are visible.  She has a patch of hemosiderosis with no erythema on the left.  She has pitting edema, 2+ on the left.    Labs:  Hgb 10.2    Creatinine 0.72, GFR > 90    Imagin17 Ultrasound:  FINDINGS:  In the left lower extremity, the common femoral, femoral (with mid  vein duplication), and popliteal veins demonstrate normal  compressibility and blood flow. Within the calf there is a portion of  the proximal posterior tibial and peroneal veins are noncompressible  and contains hypoechoic material.     The right common femoral vein was evaluated for comparison purposes  and demonstrated normal flow and compressibility.            IMPRESSION:  DVT within branches of the proximal left peroneal and posterior tibial  veins.       Assessment:  In  summary, Hafsa Corona is a 62 year old woman with a right sided distal DVT.  Possible provoking factors include:  1. Immobility  2. Cellulitis of the same lower extremity  3. BMI > 30    Plan:  1. Majority of today's visit was spent counseling the patient regarding treatment and prevention of VTE.  2. She needs 3 months of treatment for this distal,very symptomatic, DVT.  3. We discussed 2 different strategies for treatment: warfarin and DOAC. I explained that the down-side of DOACs are that we do not have an antidote for these drugs (outside of dabigatran).  The positives of these drugs are that we saw less bleeding compared with warfarin in the trials, they do not require monitoring, and they are fast-on and fast-off which makes interruption for surgery or bleeding much easier.  She asked that we write a prescription for rivaroxaban today, and she can start the medication once her INR is < 3.  4. She will return in 3 months after she has completed her therapy for a distal DVT.  We explained that the decision for indefinite therapy vs stopping is made based on her risk for recurrence.  Currently I would consider her risks for recurrence to be:    - BMI > 30    - DVT from minor risk factors    - possible post-thrombotic syndrome (re-evaluate at 3 months)    In order to understand her risks more completely I suggested we test her for inherited thrombophilias (FVL, Prothrombin gene mutation, AT def, Protein C and S deficiencies) and antiphospholipid antibody syndrome while off anticoagulation.  5. I outlined potential therapeutic options for secondary prevention of DVT:    - No chronic therapy, could use rivaroxaban only during high-risk situations (travel, surgery etc)    - ASA, which based on the [facility]-CHOICE trial (gave her a copy) reduced risk for recurrence from 10% to about 5% in the first year after a thrombosis    - Rivaroxavan 20 mg or 10 mg also based on the [facility]-CHOICE trial that were shown to  equally lower the risk of recurrence in the first year to about 1%.    - We also briefly discussed combining ASA (for cardiac risk reduction given her age and DM diagnoses) with rivaroxaban 10mg.  I would not favor 20mg as this is known to have a higher risk of intracranial hemorrhage over rivaroxaban alone.  6. She is feeling quite fatigued and her latest hgb is 10 which makes me concerned that she has symptomatic anemia.  We will check a ferritin and CBC today.    The patient is given our center's contact information and is instructed to call if she should have any further questions or concerns.  Otherwise, we will plan on seeing her back in about 3 months with labs and a new baseline ultrasound.      Lola Travis, nurse clinician is present throughout the entire clinic visit with the patient today.  Patient understands and agrees with the above plan and recommendation.    Total Time Spent:  I spent a total of 60 minutes face-to-face with Hafsa Corona during today's office visit.  Over 50% of this time was spent counseling the patient and/or coordinating care regarding VTE treatment and prevention.    William Villarreal MD   of Medicine  [employer]

## 2017-06-23 NOTE — PATIENT INSTRUCTIONS
1.  Get INR/ferretin today in lab (first floor).  2.  US in 3 months & with hypercoag labs, & visit with Dr. Villarreal 9/15/17 (stop Xarelto 1 week prior to labs 9/8/17).

## 2017-06-23 NOTE — TELEPHONE ENCOUNTER
Hafsa Corona  MRN: [id number]  Female, 62 year old, 1955    Hafsa notified that INR today is 2.3; she is to discontinue Warfarin & start Xarelto tomorrow (with food).  Nevaeh Travis, RN, MSN -Nurse Clinician, [facility]

## 2017-06-23 NOTE — NURSING NOTE
Hafsa Corona  MRN: [id number]  Female, 62 year old, 1955    Reason for Visit:  New consult for DVT  Attended entire visit with Dr. Villarreal  Face To Face Time for Education (After provider visit):5    Teaching Flowsheet   Relevant Diagnosis: Left leg DVT 6/5/17 (below knee peroneal/tibial) with possible cellulitis  Teaching Topic: Signs & Symptoms of DVT/PE, high risk times for venous clots, Xarelto & carepath program, compression stockings, post-thrombotic syndrome    Plan:  Switching from Warfarin to Xarelto. See back in 3 months with US & labs 9/15/17 (Phil at 1:30, US 12:30, Lab 12:15); stop Xarelto 9/8/17     Person(s) involved in teaching:   Patient     Motivation Level: good  Asks Questions: Yes      Eager to Learn: Yes  Cooperative: Yes    Receptive (willing/able to accept information): Yes     Patient demonstrates understanding of the following:  Reason for the appointment, diagnosis and treatment plan: Yes  Knowledge of proper use of medications and conditions for which they are ordered (with special attention to potential side effects or drug interactions): Yes  Which situations necessitate calling provider and whom to contact: Yes    Nutritional needs and diet plan: NA  Pain management techniques: NA  Wound Care: NA  How and/when to access community resources: NA    Educated patient about the signs, symptoms of and risk factors for venous thrombosis (VTE) and provided the National Blood Clot [place] book monisha, which reviews these facts.    Patient educated about the signs, symptoms and treatment for post thrombotic syndrome.     Has post-thrombotic swelling; has compression stockings at home.  Was encouraged to wear during day and take off at night.    Patient educated about benefits and potential complications of anticoagulation.       Discussed the differences between arterial and venous thrombosis with the patient and the divergent medications used for each.    Patient verbalized  understanding of the above information.  Reviewed and discussed the patient instructions point by point and patient verbalized understanding.They deny further questions at this time.    Copy of the After Visit Summary (AVS) given to patient for future reference. Patient given the contact card for the [facility] and has the appropriate numbers to call with any questions.  Nevaeh Travis RN, MSN -Nurse Clinician, [facility]

## 2017-06-23 NOTE — NURSING NOTE
Chief Complaint   Patient presents with     RECHECK     Acute deep vein thrombosis   Pt roomed, vitals, meds, and allergies reviewed with pt. Pt ready for provider.  Jamie Wang, CMA

## 2017-06-23 NOTE — LETTER
2017      RE: Hafsa Corona  [address]  [place] [address]         [facility]  [address] [place] [address], [facility] [address], [place] [address]  Phone: [phone], Fax: [phone].    Outpatient Visit Note:    Patient: Hafsa Corona  MRN: [id number]  : 1955  CLIFF: 2017    Reason for Consultation:  Distal DVT    History of Present Illness:  Hafsa Corona  is a 62 year old woman referred by Pema Ayon NP for evaluation and treatment of a distal DVT. Hafsa reports no history of overseas travel, surgery or hospitalization leading up to her presentation on 2017 to her PCP with complaints of left lower extremity swelling and pain.  She was treated empirically for cellulitis and an ultrasound the same day showed a DVT of the proximal left peroneal and posterior tibial veins.  She was treated with LMWH and transitioned to warfarin, which she is currently on.  She reports no bleeding on the medication.  She notes that she has chronic fatigue for at least the last year which did also seem worse on warfarin.    Past Medical History:  Past Medical History:   Diagnosis Date     Anxiety state, unspecified     [id number]     Attention deficit disorder without mention of hyperactivity      Chronic rhinitis      Depressive disorder, not elsewhere classified      Panic disorder without agoraphobia      Pure hypercholesterolemia     Lipitor     Tachycardia, unspecified     1999-2004     Type II or unspecified type diabetes mellitus without mention of complication, not stated as uncontrolled     diagnosed        Past Surgical History:  Past Surgical History:   Procedure Laterality Date     SALPINGO OOPHORECTOMY,R/L/KAYY      Salpingo Oophorectomy, RT     SURGICAL HISTORY OF -       facial surgery d/t fall in 2002     SURGICAL HISTORY OF -        removal of breast cyst     SURGICAL HISTORY OF -       endometrial ablation        Medications:  Current Outpatient Prescriptions   Medication Sig Dispense Refill     order for DME Compression stockings 20-30 mm Hg. To be wear when directed by Hematology team and while awake for the first two years post clot. 1 each 0     Digestive Enzymes (DIGESTIVE ENZYME PO) Take by mouth as needed        warfarin (COUMADIN) 5 MG tablet Take 1 tablet (5 mg) by mouth daily 30 tablet 0     CALCIUM-MAGNESIUM-VITAMIN D PO Take 2 tablets by mouth daily       Probiotic Product (PRO-BIOTIC BLEND PO) Take 1 capsule by mouth daily Enzymatic Therapy-probiotic pearls       atorvastatin (LIPITOR) 80 MG tablet Take 1 tablet (80 mg) by mouth daily 90 tablet PRN     order for DME Equipment being ordered: blood pressure monitor 1 Units 0     lisinopril (PRINIVIL/ZESTRIL) 10 MG tablet Take 1 tablet (10 mg) by mouth daily 90 tablet PRN     metFORMIN (GLUCOPHAGE-XR) 500 MG 24 hr tablet Take two tabs by mouth once daily with evening meal. 180 tablet PRN     escitalopram (LEXAPRO) 20 MG tablet Take 1 tablet (20 mg) by mouth daily 30 tablet PRN     VYVANSE 50 MG capsule Reported on 4/11/2017  0     EPINEPHrine (EPIPEN) 0.3 MG/0.3ML injection Inject 0.3 mLs (0.3 mg) into the muscle once as needed for anaphylaxis 1 each PRN     albuterol (ALBUTEROL) 108 (90 BASE) MCG/ACT inhaler Inhale 2 puffs into the lungs 4 times daily as needed for shortness of breath / dyspnea 1 Inhaler PRN     fluticasone (FLONASE) 50 MCG/ACT nasal spray Spray 2 sprays into both nostrils daily 48 g 1     ASPIRIN NOT PRESCRIBED, INTENTIONAL, Reported on 4/11/2017 0 each 0     Cholecalciferol (VITAMIN D) 1000 UNIT capsule Take 2,000 Units by mouth daily        ibuprofen (ADVIL,MOTRIN) 200 MG tablet Take 400 mg by mouth every 4 hours as needed.       UNKNOWN MED DOSAGE vitamin B 50       MULTIVITAMIN OR Daily.       amoxicillin-clavulanate (AUGMENTIN) 875-125 MG per tablet Take 1 tablet by mouth 2 times daily (Patient not taking: Reported on 6/23/2017) 42  tablet 4        Allergies:  Allergies   Allergen Reactions     Sulfa Drugs      Wasps [Hornets]        ROS:  Denies any bleeding issues. No gum bleeding, No nose bleed. Denies any hematuria or blood in stools. Denies any ecchymosis. Denies any lower extremities swelling or pain. Denies any fever, no chest pain. Denies any shortness of breath.    Social History:  Denies any tobacco use. No significant alcohol use. Denies any illicit drug use. Patient works as an employment counselor.    Family History:  Father  from complications from stroke, was on warfarin.  An aunt had a DVT and PE, likely due to cancer.    Objectives:  Pleasant 62 year old female in no acute distress.  Vitals: B/P: 116/77, T: 98.3, P: 91, R: Data Unavailable, Wt: 209 lbs 6.4 oz  Body mass index is 36.51 kg/(m^2).   Exam:   Gen: Appears well, no distress  HEENT: No scleral icterus or hemorrahge, no wet purpura, no lymphadenopathy  Ext: right leg is visibly larger than the left.  She has varicocities on both legs that are visible.  She has a patch of hemosiderosis with no erythema on the left.  She has pitting edema, 2+ on the left.    Labs:  Hgb 10.2    Creatinine 0.72, GFR > 90    Imagin17 Ultrasound:  FINDINGS:  In the left lower extremity, the common femoral, femoral (with mid  vein duplication), and popliteal veins demonstrate normal  compressibility and blood flow. Within the calf there is a portion of  the proximal posterior tibial and peroneal veins are noncompressible  and contains hypoechoic material.     The right common femoral vein was evaluated for comparison purposes  and demonstrated normal flow and compressibility.            IMPRESSION:  DVT within branches of the proximal left peroneal and posterior tibial  veins.       Assessment:  In summary, Hafsa Corona is a 62 year old woman with a right sided distal DVT.  Possible provoking factors include:  1. Immobility  2. Cellulitis of the same lower extremity  3. BMI >  30    Plan:  1. Majority of today's visit was spent counseling the patient regarding treatment and prevention of VTE.  2. She needs 3 months of treatment for this distal,very symptomatic, DVT.  3. We discussed 2 different strategies for treatment: warfarin and DOAC. I explained that the down-side of DOACs are that we do not have an antidote for these drugs (outside of dabigatran).  The positives of these drugs are that we saw less bleeding compared with warfarin in the trials, they do not require monitoring, and they are fast-on and fast-off which makes interruption for surgery or bleeding much easier.  She asked that we write a prescription for rivaroxaban today, and she can start the medication once her INR is < 3.  4. She will return in 3 months after she has completed her therapy for a distal DVT.  We explained that the decision for indefinite therapy vs stopping is made based on her risk for recurrence.  Currently I would consider her risks for recurrence to be:    - BMI > 30    - DVT from minor risk factors    - possible post-thrombotic syndrome (re-evaluate at 3 months)    In order to understand her risks more completely I suggested we test her for inherited thrombophilias (FVL, Prothrombin gene mutation, AT def, Protein C and S deficiencies) and antiphospholipid antibody syndrome while off anticoagulation.  5. I outlined potential therapeutic options for secondary prevention of DVT:    - No chronic therapy, could use rivaroxaban only during high-risk situations (travel, surgery etc)    - ASA, which based on the EINSTEIN-CHOICE trial (gave her a copy) reduced risk for recurrence from 10% to about 5% in the first year after a thrombosis    - Rivaroxavan 20 mg or 10 mg also based on the EINSTEIN-CHOICE trial that were shown to equally lower the risk of recurrence in the first year to about 1%.    - We also briefly discussed combining ASA (for cardiac risk reduction given her age and DM diagnoses) with  rivaroxaban 10mg.  I would not favor 20mg as this is known to have a higher risk of intracranial hemorrhage over rivaroxaban alone.  6. She is feeling quite fatigued and her latest hgb is 10 which makes me concerned that she has symptomatic anemia.  We will check a ferritin and CBC today.    The patient is given our center's contact information and is instructed to call if she should have any further questions or concerns.  Otherwise, we will plan on seeing her back in about 3 months with labs and a new baseline ultrasound.      Lola Travis, nurse clinician is present throughout the entire clinic visit with the patient today.  Patient understands and agrees with the above plan and recommendation.    Total Time Spent:  I spent a total of 60 minutes face-to-face with Hafsa Corona during today's office visit.  Over 50% of this time was spent counseling the patient and/or coordinating care regarding VTE treatment and prevention.    William Villarreal MD   of Medicine  [facility]

## 2017-06-23 NOTE — PROGRESS NOTES
[facility]  [address] [place] [address], [facility] [address], [place] [address]  Phone: [phone], Fax: [phone].    Outpatient Visit Note:    Patient: Hafsa Corona  MRN: [id number]  : 1955  CLIFF: 2017    Reason for Consultation:  Distal DVT    History of Present Illness:  Hafsa Corona  is a 62 year old woman referred by Pema Ayon NP for evaluation and treatment of a distal DVT. Hafsa reports no history of overseas travel, surgery or hospitalization leading up to her presentation on 2017 to her PCP with complaints of left lower extremity swelling and pain.  She was treated empirically for cellulitis and an ultrasound the same day showed a DVT of the proximal left peroneal and posterior tibial veins.  She was treated with LMWH and transitioned to warfarin, which she is currently on.  She reports no bleeding on the medication.  She notes that she has chronic fatigue for at least the last year which did also seem worse on warfarin.    Past Medical History:  Past Medical History:   Diagnosis Date     Anxiety state, unspecified     [id number]     Attention deficit disorder without mention of hyperactivity      Chronic rhinitis      Depressive disorder, not elsewhere classified      Panic disorder without agoraphobia      Pure hypercholesterolemia     Lipitor     Tachycardia, unspecified     1999-2004     Type II or unspecified type diabetes mellitus without mention of complication, not stated as uncontrolled     diagnosed        Past Surgical History:  Past Surgical History:   Procedure Laterality Date     SALPINGO OOPHORECTOMY,R/L/KAYY      Salpingo Oophorectomy, RT     SURGICAL HISTORY OF -       facial surgery d/t fall in 2002     SURGICAL HISTORY OF -        removal of breast cyst     SURGICAL HISTORY OF -       endometrial ablation       Medications:  Current Outpatient Prescriptions   Medication Sig Dispense Refill     order for [facility]  Compression stockings 20-30 mm Hg. To be wear when directed by Hematology team and while awake for the first two years post clot. 1 each 0     Digestive Enzymes (DIGESTIVE ENZYME PO) Take by mouth as needed        warfarin (COUMADIN) 5 MG tablet Take 1 tablet (5 mg) by mouth daily 30 tablet 0     CALCIUM-MAGNESIUM-VITAMIN D PO Take 2 tablets by mouth daily       Probiotic Product (PRO-BIOTIC BLEND PO) Take 1 capsule by mouth daily Enzymatic Therapy-probiotic pearls       atorvastatin (LIPITOR) 80 MG tablet Take 1 tablet (80 mg) by mouth daily 90 tablet PRN     order for DME Equipment being ordered: blood pressure monitor 1 Units 0     lisinopril (PRINIVIL/ZESTRIL) 10 MG tablet Take 1 tablet (10 mg) by mouth daily 90 tablet PRN     metFORMIN (GLUCOPHAGE-XR) 500 MG 24 hr tablet Take two tabs by mouth once daily with evening meal. 180 tablet PRN     escitalopram (LEXAPRO) 20 MG tablet Take 1 tablet (20 mg) by mouth daily 30 tablet PRN     VYVANSE 50 MG capsule Reported on 4/11/2017  0     EPINEPHrine (EPIPEN) 0.3 MG/0.3ML injection Inject 0.3 mLs (0.3 mg) into the muscle once as needed for anaphylaxis 1 each PRN     albuterol (ALBUTEROL) 108 (90 BASE) MCG/ACT inhaler Inhale 2 puffs into the lungs 4 times daily as needed for shortness of breath / dyspnea 1 Inhaler PRN     fluticasone (FLONASE) 50 MCG/ACT nasal spray Spray 2 sprays into both nostrils daily 48 g 1     ASPIRIN NOT PRESCRIBED, INTENTIONAL, Reported on 4/11/2017 0 each 0     Cholecalciferol (VITAMIN D) 1000 UNIT capsule Take 2,000 Units by mouth daily        ibuprofen (ADVIL,MOTRIN) 200 MG tablet Take 400 mg by mouth every 4 hours as needed.       UNKNOWN MED DOSAGE vitamin B 50       MULTIVITAMIN OR Daily.       amoxicillin-clavulanate (AUGMENTIN) 875-125 MG per tablet Take 1 tablet by mouth 2 times daily (Patient not taking: Reported on 6/23/2017) 42 tablet 4        Allergies:  Allergies   Allergen Reactions     Sulfa Drugs      Wasps [Hornets]         ROS:  Denies any bleeding issues. No gum bleeding, No nose bleed. Denies any hematuria or blood in stools. Denies any ecchymosis. Denies any lower extremities swelling or pain. Denies any fever, no chest pain. Denies any shortness of breath.    Social History:  Denies any tobacco use. No significant alcohol use. Denies any illicit drug use. Patient works as an employment counselor.    Family History:  Father  from complications from stroke, was on warfarin.  An aunt had a DVT and PE, likely due to cancer.    Objectives:  Pleasant 62 year old female in no acute distress.  Vitals: B/P: 116/77, T: 98.3, P: 91, R: Data Unavailable, Wt: 209 lbs 6.4 oz  Body mass index is 36.51 kg/(m^2).   Exam:   Gen: Appears well, no distress  HEENT: No scleral icterus or hemorrahge, no wet purpura, no lymphadenopathy  Ext: right leg is visibly larger than the left.  She has varicocities on both legs that are visible.  She has a patch of hemosiderosis with no erythema on the left.  She has pitting edema, 2+ on the left.    Labs:  Hgb 10.2    Creatinine 0.72, GFR > 90    Imagin17 Ultrasound:  FINDINGS:  In the left lower extremity, the common femoral, femoral (with mid  vein duplication), and popliteal veins demonstrate normal  compressibility and blood flow. Within the calf there is a portion of  the proximal posterior tibial and peroneal veins are noncompressible  and contains hypoechoic material.     The right common femoral vein was evaluated for comparison purposes  and demonstrated normal flow and compressibility.            IMPRESSION:  DVT within branches of the proximal left peroneal and posterior tibial  veins.       Assessment:  In summary, Hafsa Corona is a 62 year old woman with a right sided distal DVT.  Possible provoking factors include:  1. Immobility  2. Cellulitis of the same lower extremity  3. BMI > 30    Plan:  1. Majority of today's visit was spent counseling the patient regarding treatment and  prevention of VTE.  2. She needs 3 months of treatment for this distal,very symptomatic, DVT.  3. We discussed 2 different strategies for treatment: warfarin and DOAC. I explained that the down-side of DOACs are that we do not have an antidote for these drugs (outside of dabigatran).  The positives of these drugs are that we saw less bleeding compared with warfarin in the trials, they do not require monitoring, and they are fast-on and fast-off which makes interruption for surgery or bleeding much easier.  She asked that we write a prescription for rivaroxaban today, and she can start the medication once her INR is < 3.  4. She will return in 3 months after she has completed her therapy for a distal DVT.  We explained that the decision for indefinite therapy vs stopping is made based on her risk for recurrence.  Currently I would consider her risks for recurrence to be:    - BMI > 30    - DVT from minor risk factors    - possible post-thrombotic syndrome (re-evaluate at 3 months)    In order to understand her risks more completely I suggested we test her for inherited thrombophilias (FVL, Prothrombin gene mutation, AT def, Protein C and S deficiencies) and antiphospholipid antibody syndrome while off anticoagulation.  5. I outlined potential therapeutic options for secondary prevention of DVT:    - No chronic therapy, could use rivaroxaban only during high-risk situations (travel, surgery etc)    - ASA, which based on the EINSTEIN-CHOICE trial (gave her a copy) reduced risk for recurrence from 10% to about 5% in the first year after a thrombosis    - Rivaroxavan 20 mg or 10 mg also based on the EINSTEIN-CHOICE trial that were shown to equally lower the risk of recurrence in the first year to about 1%.    - We also briefly discussed combining ASA (for cardiac risk reduction given her age and DM diagnoses) with rivaroxaban 10mg.  I would not favor 20mg as this is known to have a higher risk of intracranial hemorrhage  over rivaroxaban alone.  6. She is feeling quite fatigued and her latest hgb is 10 which makes me concerned that she has symptomatic anemia.  We will check a ferritin and CBC today.    The patient is given our center's contact information and is instructed to call if she should have any further questions or concerns.  Otherwise, we will plan on seeing her back in about 3 months with labs and a new baseline ultrasound.      Lola Travis, nurse clinician is present throughout the entire clinic visit with the patient today.  Patient understands and agrees with the above plan and recommendation.    Total Time Spent:  I spent a total of 60 minutes face-to-face with Hafsa Corona during today's office visit.  Over 50% of this time was spent counseling the patient and/or coordinating care regarding VTE treatment and prevention.    William Villarreal MD   of Medicine  [facility]

## 2017-06-28 ENCOUNTER — OFFICE VISIT (OUTPATIENT)
Dept: FAMILY MEDICINE | Facility: CLINIC | Age: 62
End: 2017-06-28
Payer: COMMERCIAL

## 2017-06-28 VITALS
WEIGHT: 210 LBS | RESPIRATION RATE: 16 BRPM | SYSTOLIC BLOOD PRESSURE: 128 MMHG | BODY MASS INDEX: 36.62 KG/M2 | DIASTOLIC BLOOD PRESSURE: 66 MMHG | OXYGEN SATURATION: 98 % | TEMPERATURE: 98.4 F | HEART RATE: 103 BPM
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
## 2017-06-28 DIAGNOSIS — S81.852A DOG BITE OF LOWER LEG, LEFT, INITIAL ENCOUNTER: ICD-10-CM

## 2017-06-28 DIAGNOSIS — I87.2 VENOUS STASIS DERMATITIS OF LEFT LOWER EXTREMITY: ICD-10-CM

## 2017-06-28 DIAGNOSIS — I82.402 ACUTE DEEP VEIN THROMBOSIS (DVT) OF LEFT LOWER EXTREMITY, UNSPECIFIED VEIN (H): ICD-10-CM

## 2017-06-28 PROCEDURE — 99214 OFFICE O/P EST MOD 30 MIN: CPT | Performed by: FAMILY MEDICINE

## 2017-06-28 NOTE — PROGRESS NOTES
SUBJECTIVE:                                                    Hafsa Corona is a 62 year old female who presents to clinic today for the following health issues:      Dog was due for rabies vaccine on May 28, 2017. She will discuss with vet if vaccination covers a prolonged period.   Last night, pts dog was provoked by another dog and then bit pt. Dog bite is on the left thigh. She has been icing and keeping leg elevated. No fever, chills or drainage.   Pt had a left leg DVT on May 2017. Pt recently switched over to xarelto. Pt concerned about skin changes on the left leg, she was previously tx for cellulitis.     Problem list and histories reviewed & adjusted, as indicated.  Additional history: as documented    Labs reviewed in EPIC    Reviewed and updated as needed this visit by clinical staff  Tobacco  Allergies  Med Hx  Surg Hx  Fam Hx  Soc Hx      Reviewed and updated as needed this visit by Provider         ROS:  Constitutional, HEENT, cardiovascular, pulmonary, gi and gu systems are negative, except as otherwise noted.    OBJECTIVE:     /66 (BP Location: Right arm, Patient Position: Chair, Cuff Size: Adult Regular)  Pulse 103  Temp 98.4  F (36.9  C) (Tympanic)  Resp 16  Wt 210 lb (95.3 kg)  SpO2 98%  BMI 36.62 kg/m2  Body mass index is 36.62 kg/(m^2).  GENERAL: healthy, alert and no distress  EYES: Eyes grossly normal to inspection  HENT:  nose and mouth without ulcers or lesions  MS/SKIN: lateral thigh with hematoma and two dog bites, left lower leg with patch of faint redness     Diagnostic Test Results:  none     ASSESSMENT/PLAN:     ## Dog bite of lower leg, left, initial encounter  - No cellulitis, no need for abx. Discussed to alternate warm and cold compresses.   - Verify with her vet whether she needs the rabies vaccine, if she does then she would need to follow up in the ER for the first vaccine then clinic for there remainder.   - Call if she is experiencing warmth, fever,  chills, redness or swelling, then will do course of abx to cover for cellulitis.     ## Acute deep vein thrombosis (DVT) of left lower extremity, unspecified vein (H)  - Pt currently taking xarelto. She adams shave edema and venous statsis dermatitis of the left lower extremity. We discussed that could be 2/2 to DVT. To start compression stockings once dog bite heals.       Nelida Vides MD  [facility]

## 2017-06-28 NOTE — MR AVS SNAPSHOT
"              After Visit Summary   2017    Hafsa Corona    MRN: [id number]           Patient Information     Date Of Birth          1955        Visit Information        Provider Department      2017 12:40 PM Nelida Vides MD [facility] [place]        Today's Diagnoses     Dog bite of lower leg, left, initial encounter        Acute deep vein thrombosis (DVT) of left lower extremity, unspecified vein (H)        Venous stasis dermatitis of left lower extremity           Follow-ups after your visit        Who to contact     If you have questions or need follow up information about today's clinic visit or your schedule please contact [facility] directly at [phone].  Normal or non-critical lab and imaging results will be communicated to you by MyChart, letter or phone within 4 business days after the clinic has received the results. If you do not hear from us within 7 days, please contact the clinic through MyChart or phone. If you have a critical or abnormal lab result, we will notify you by phone as soon as possible.  Submit refill requests through [employer] or call your pharmacy and they will forward the refill request to us. Please allow 3 business days for your refill to be completed.          Additional Information About Your Visit        MyChart Information     [employer] lets you send messages to your doctor, view your test results, renew your prescriptions, schedule appointments and more. To sign up, go to [email or web address] . Click on \"Log in\" on the left side of the screen, which will take you to the Welcome page. Then click on \"Sign up Now\" on the right side of the page.     You will be asked to enter the access code listed below, as well as some personal information. Please follow the directions to create your username and password.     Your access code is: [email or web address]  Expires: 7/10/2017 11:13 AM     Your access code will  in 90 days. If you need help " or a new code, please call your [facility] clinic or [phone].        Care EveryWhere ID     This is your Care EveryWhere ID. This could be used by other organizations to access your [facility] medical records  [id number]        Your Vitals Were     Pulse Temperature Respirations Pulse Oximetry BMI (Body Mass Index)       103 98.4  F (36.9  C) (Tympanic) 16 98% 36.62 kg/m2        Blood Pressure from Last 3 Encounters:   06/28/17 128/66   06/23/17 116/77   06/05/17 109/77    Weight from Last 3 Encounters:   06/28/17 210 lb (95.3 kg)   06/23/17 209 lb 6.4 oz (95 kg)   06/05/17 206 lb (93.4 kg)              Today, you had the following     No orders found for display       Primary Care Provider Office Phone # Fax #    Morelia Ayon [phone]       [facility] [address] [facility] [address]        Equal Access to Services     BHUPINDER ARANDA : Hadii aad ku hadasho Soomaali, waaxda luqadaha, qaybta kaalmada adeegyada, waxay idiin hayaan erika betts . So [facility] [phone].    ATENCIÓN: Si habla español, tiene a martínez disposición servicios gratuitos de asistencia lingüística. Llame al [phone].    We comply with applicable federal civil rights laws and [place] laws. We do not discriminate on the basis of race, color, national origin, age, disability sex, sexual orientation or gender identity.            Thank you!     Thank you for choosing [facility]  for your care. Our goal is always to provide you with excellent care. Hearing back from our patients is one way we can continue to improve our services. Please take a few minutes to complete the written survey that you may receive in the mail after your visit with us. Thank you!             Your Updated Medication List - Protect others around you: Learn how to safely use, store and throw away your medicines at www.disposemymeds.org.          This list is accurate as of: 6/28/17  1:20 PM.  Always use  your most recent med list.                   Brand Name Dispense Instructions for use Diagnosis    albuterol 108 (90 BASE) MCG/ACT Inhaler    albuterol    1 Inhaler    Inhale 2 puffs into the lungs 4 times daily as needed for shortness of breath / dyspnea    Cough       amoxicillin-clavulanate 875-125 MG per tablet    AUGMENTIN    42 tablet    Take 1 tablet by mouth 2 times daily    Chronic maxillary sinusitis       * ASPIRIN NOT PRESCRIBED    INTENTIONAL    0 each    Reported on 4/11/2017        atorvastatin 80 MG tablet    LIPITOR    90 tablet    Take 1 tablet (80 mg) by mouth daily    Hyperlipidemia LDL goal <100       CALCIUM-MAGNESIUM-VITAMIN D PO      Take 2 tablets by mouth daily        DIGESTIVE ENZYME PO      Take by mouth as needed        EPINEPHrine 0.3 MG/0.3ML injection     1 each    Inject 0.3 mLs (0.3 mg) into the muscle once as needed for anaphylaxis    Bee allergy status       escitalopram 20 MG tablet    LEXAPRO    30 tablet    Take 1 tablet (20 mg) by mouth daily    Major depressive disorder, recurrent episode, mild (H)       fluticasone 50 MCG/ACT spray    FLONASE    48 g    Spray 2 sprays into both nostrils daily    Chronic maxillary sinusitis       ibuprofen 200 MG tablet    ADVIL/MOTRIN     Take 400 mg by mouth every 4 hours as needed.        lisinopril 10 MG tablet    PRINIVIL/ZESTRIL    90 tablet    Take 1 tablet (10 mg) by mouth daily    Essential hypertension       metFORMIN 500 MG 24 hr tablet    GLUCOPHAGE-XR    180 tablet    Take two tabs by mouth once daily with evening meal.    Type 2 diabetes mellitus without complication, without long-term current use of insulin (H)       MULTIVITAMIN PO      Daily.        order for DME     1 Units    Equipment being ordered: blood pressure monitor    Essential hypertension       order for DME     1 each    Compression stockings 20-30 mm Hg. To be wear when directed by Hematology team and while awake for the first two years post clot.    Long-term  (current) use of anticoagulants, Acute deep vein thrombosis (DVT) of left lower extremity, unspecified vein (H)       PRO-BIOTIC BLEND PO      Take 1 capsule by mouth daily Enzymatic Therapy-probiotic pearls        rivaroxaban ANTICOAGULANT 20 MG Tabs tablet    XARELTO    30 tablet    Take 1 tablet (20 mg) by mouth daily (with dinner)    Long-term (current) use of anticoagulants, Acute deep vein thrombosis (DVT) of left lower extremity, unspecified vein (H)       * UNKNOWN MED DOSAGE      vitamin B 50        vitamin D 1000 UNITS capsule      Take 2,000 Units by mouth daily        VYVANSE 50 MG capsule   Generic drug:  lisdexamfetamine      Reported on 4/11/2017        warfarin 5 MG tablet    COUMADIN    30 tablet    Take 1 tablet (5 mg) by mouth daily    Acute deep vein thrombosis (DVT) of distal vein of left lower extremity (H)       * Notice:  This list has 2 medication(s) that are the same as other medications prescribed for you. Read the directions carefully, and ask your doctor or other care provider to review them with you.

## 2017-06-28 NOTE — NURSING NOTE
"Chief Complaint   Patient presents with     Dog Bite     left thigh       Initial /66 (BP Location: Right arm, Patient Position: Chair, Cuff Size: Adult Regular)  Pulse 103  Temp 98.4  F (36.9  C) (Tympanic)  Resp 16  Wt 210 lb (95.3 kg)  SpO2 98%  BMI 36.62 kg/m2 Estimated body mass index is 36.62 kg/(m^2) as calculated from the following:    Height as of 6/23/17: 5' 3.5\" (1.613 m).    Weight as of this encounter: 210 lb (95.3 kg).  Medication Reconciliation: complete     Kayla Simmons MA      "

## 2017-06-30 ENCOUNTER — TELEPHONE (OUTPATIENT)
Dept: HEMATOLOGY | Facility: CLINIC | Age: 62
End: 2017-06-30

## 2017-07-01 ENCOUNTER — HOSPITAL ENCOUNTER (EMERGENCY)
Facility: CLINIC | Age: 62
Discharge: HOME | End: 2017-07-01
Attending: EMERGENCY MEDICINE | Admitting: EMERGENCY MEDICINE
Payer: COMMERCIAL

## 2017-07-01 ENCOUNTER — APPOINTMENT (OUTPATIENT)
Dept: CT IMAGING | Facility: CLINIC | Age: 62
End: 2017-07-01
Attending: EMERGENCY MEDICINE
Payer: COMMERCIAL

## 2017-07-01 VITALS
RESPIRATION RATE: 16 BRPM | HEART RATE: 99 BPM | SYSTOLIC BLOOD PRESSURE: 120 MMHG | TEMPERATURE: 98.2 F | DIASTOLIC BLOOD PRESSURE: 81 MMHG | OXYGEN SATURATION: 95 %
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
## 2017-07-01 DIAGNOSIS — L03.116 CELLULITIS OF LEG, LEFT: ICD-10-CM

## 2017-07-01 DIAGNOSIS — R19.00 PELVIC MASS: ICD-10-CM

## 2017-07-01 DIAGNOSIS — R10.84 ABDOMINAL PAIN, GENERALIZED: ICD-10-CM

## 2017-07-01 DIAGNOSIS — S71.152A OPEN BITE, LEFT THIGH, INITIAL ENCOUNTER: ICD-10-CM

## 2017-07-01 LAB
ALBUMIN SERPL-MCNC: 3.6 G/DL (ref 3.4–5)
ALBUMIN UR-MCNC: 100 MG/DL
ALP SERPL-CCNC: 103 U/L (ref 40–150)
ALT SERPL W P-5'-P-CCNC: 17 U/L (ref 0–50)
ANION GAP SERPL CALCULATED.3IONS-SCNC: 13 MMOL/L (ref 3–14)
APPEARANCE UR: CLEAR
AST SERPL W P-5'-P-CCNC: 21 U/L (ref 0–45)
BACTERIA #/AREA URNS HPF: ABNORMAL /HPF
BASOPHILS # BLD AUTO: 0 10E9/L (ref 0–0.2)
BASOPHILS NFR BLD AUTO: 0.3 %
BILIRUB SERPL-MCNC: 0.4 MG/DL (ref 0.2–1.3)
BILIRUB UR QL STRIP: NEGATIVE
BUN SERPL-MCNC: 20 MG/DL (ref 7–30)
CALCIUM SERPL-MCNC: 9.3 MG/DL (ref 8.5–10.1)
CHLORIDE SERPL-SCNC: 105 MMOL/L (ref 94–109)
CO2 SERPL-SCNC: 23 MMOL/L (ref 20–32)
COLOR UR AUTO: ABNORMAL
CREAT SERPL-MCNC: 0.82 MG/DL (ref 0.52–1.04)
DIFFERENTIAL METHOD BLD: ABNORMAL
EOSINOPHIL # BLD AUTO: 0.1 10E9/L (ref 0–0.7)
EOSINOPHIL NFR BLD AUTO: 0.7 %
ERYTHROCYTE [DISTWIDTH] IN BLOOD BY AUTOMATED COUNT: 14.2 % (ref 10–15)
GFR SERPL CREATININE-BSD FRML MDRD: 70 ML/MIN/1.7M2
GLUCOSE SERPL-MCNC: 118 MG/DL (ref 70–99)
GLUCOSE UR STRIP-MCNC: NEGATIVE MG/DL
GRAN CASTS #/AREA URNS LPF: 1 /LPF
HCT VFR BLD AUTO: 30 % (ref 35–47)
HGB BLD-MCNC: 9.6 G/DL (ref 11.7–15.7)
HGB UR QL STRIP: NEGATIVE
IMM GRANULOCYTES # BLD: 0 10E9/L (ref 0–0.4)
IMM GRANULOCYTES NFR BLD: 0.1 %
KETONES UR STRIP-MCNC: 10 MG/DL
LEUKOCYTE ESTERASE UR QL STRIP: ABNORMAL
LIPASE SERPL-CCNC: 202 U/L (ref 73–393)
LYMPHOCYTES # BLD AUTO: 1.6 10E9/L (ref 0.8–5.3)
LYMPHOCYTES NFR BLD AUTO: 22.9 %
MCH RBC QN AUTO: 27.4 PG (ref 26.5–33)
MCHC RBC AUTO-ENTMCNC: 32 G/DL (ref 31.5–36.5)
MCV RBC AUTO: 86 FL (ref 78–100)
MICRO REPORT STATUS: NORMAL
MONOCYTES # BLD AUTO: 0.5 10E9/L (ref 0–1.3)
MONOCYTES NFR BLD AUTO: 7.1 %
MUCOUS THREADS #/AREA URNS LPF: PRESENT /LPF
NEUTROPHILS # BLD AUTO: 4.8 10E9/L (ref 1.6–8.3)
NEUTROPHILS NFR BLD AUTO: 68.9 %
NITRATE UR QL: NEGATIVE
NRBC # BLD AUTO: 0 10*3/UL
NRBC BLD AUTO-RTO: 0 /100
PH UR STRIP: 6 PH (ref 5–7)
PLATELET # BLD AUTO: 305 10E9/L (ref 150–450)
POTASSIUM SERPL-SCNC: 4 MMOL/L (ref 3.4–5.3)
PROT SERPL-MCNC: 7.4 G/DL (ref 6.8–8.8)
RBC # BLD AUTO: 3.5 10E12/L (ref 3.8–5.2)
RBC #/AREA URNS AUTO: 5 /HPF (ref 0–2)
SODIUM SERPL-SCNC: 141 MMOL/L (ref 133–144)
SP GR UR STRIP: 1.02 (ref 1–1.03)
SPECIMEN SOURCE: NORMAL
SQUAMOUS #/AREA URNS AUTO: 4 /HPF (ref 0–1)
URN SPEC COLLECT METH UR: ABNORMAL
UROBILINOGEN UR STRIP-MCNC: NORMAL MG/DL (ref 0–2)
WBC # BLD AUTO: 6.9 10E9/L (ref 4–11)
WBC #/AREA URNS AUTO: 4 /HPF (ref 0–2)
WET PREP SPEC: NORMAL

## 2017-07-01 PROCEDURE — 87491 CHLMYD TRACH DNA AMP PROBE: CPT | Performed by: EMERGENCY MEDICINE

## 2017-07-01 PROCEDURE — 87591 N.GONORRHOEAE DNA AMP PROB: CPT | Performed by: EMERGENCY MEDICINE

## 2017-07-01 PROCEDURE — 93010 ELECTROCARDIOGRAM REPORT: CPT | Mod: Z6 | Performed by: EMERGENCY MEDICINE

## 2017-07-01 PROCEDURE — 25000128 H RX IP 250 OP 636: Performed by: EMERGENCY MEDICINE

## 2017-07-01 PROCEDURE — 80053 COMPREHEN METABOLIC PANEL: CPT | Performed by: EMERGENCY MEDICINE

## 2017-07-01 PROCEDURE — 93005 ELECTROCARDIOGRAM TRACING: CPT | Performed by: EMERGENCY MEDICINE

## 2017-07-01 PROCEDURE — 96374 THER/PROPH/DIAG INJ IV PUSH: CPT | Mod: 59 | Performed by: EMERGENCY MEDICINE

## 2017-07-01 PROCEDURE — 81001 URINALYSIS AUTO W/SCOPE: CPT | Performed by: EMERGENCY MEDICINE

## 2017-07-01 PROCEDURE — 87210 SMEAR WET MOUNT SALINE/INK: CPT | Performed by: EMERGENCY MEDICINE

## 2017-07-01 PROCEDURE — 25000125 ZZHC RX 250: Performed by: EMERGENCY MEDICINE

## 2017-07-01 PROCEDURE — 85025 COMPLETE CBC W/AUTO DIFF WBC: CPT | Performed by: EMERGENCY MEDICINE

## 2017-07-01 PROCEDURE — 99285 EMERGENCY DEPT VISIT HI MDM: CPT | Mod: 25 | Performed by: EMERGENCY MEDICINE

## 2017-07-01 PROCEDURE — 74177 CT ABD & PELVIS W/CONTRAST: CPT

## 2017-07-01 PROCEDURE — 83690 ASSAY OF LIPASE: CPT | Performed by: EMERGENCY MEDICINE

## 2017-07-01 RX ORDER — IOPAMIDOL 755 MG/ML
100 INJECTION, SOLUTION INTRAVASCULAR ONCE
Status: COMPLETED | OUTPATIENT
Start: 2017-07-01 | End: 2017-07-01

## 2017-07-01 RX ORDER — KETOROLAC TROMETHAMINE 30 MG/ML
30 INJECTION, SOLUTION INTRAMUSCULAR; INTRAVENOUS ONCE
Status: COMPLETED | OUTPATIENT
Start: 2017-07-01 | End: 2017-07-01

## 2017-07-01 RX ORDER — MORPHINE SULFATE 2 MG/ML
2 INJECTION, SOLUTION INTRAMUSCULAR; INTRAVENOUS ONCE
Status: DISCONTINUED | OUTPATIENT
Start: 2017-07-01 | End: 2017-07-01 | Stop reason: HOSPADM

## 2017-07-01 RX ORDER — TRAMADOL HYDROCHLORIDE 50 MG/1
50 TABLET ORAL EVERY 6 HOURS PRN
Qty: 20 TABLET | Refills: 0 | Status: ON HOLD | OUTPATIENT
Start: 2017-07-01 | End: 2017-07-11

## 2017-07-01 RX ADMIN — KETOROLAC TROMETHAMINE 30 MG: 30 INJECTION, SOLUTION INTRAMUSCULAR at 19:14

## 2017-07-01 RX ADMIN — IOPAMIDOL 100 ML: 755 INJECTION, SOLUTION INTRAVENOUS at 19:32

## 2017-07-01 RX ADMIN — SODIUM CHLORIDE 66 ML: 9 INJECTION, SOLUTION INTRAVENOUS at 19:35

## 2017-07-01 ASSESSMENT — ENCOUNTER SYMPTOMS
DIARRHEA: 0
ABDOMINAL PAIN: 1
DIFFICULTY URINATING: 0
VOMITING: 0
NAUSEA: 0
FEVER: 0
DYSURIA: 0
SHORTNESS OF BREATH: 0

## 2017-07-01 NOTE — ED PROVIDER NOTES
History     Chief Complaint   Patient presents with     Abdominal Pain     severe mid abdominal pain, started 1 hour ago     HPI  Hafsa Corona is a 62 year old female with a history of DM Type II, anxiety, hyperlipidemia, chronic rhinitis, right oophorectomy (2008) and DVT who was recently seen by her primary care physician for a dog bite (6/28/17). The patient reports her dog is not up to date with its rabies booster, but she has been in contact with animal services and does not believe the dog has rabies. Today, she presents to the Emergency Department with two friends for evaluation of abdominal pain. The patient reports she had sudden onset of diffuse, gale-umbilical pain this afternoon as she was preparing to go to dinner with friends. She says the pain was a 10/10 upon arrival to the ED, but it has since lessened to 8/10. She reports her last BM was around 3:00pm (3 hours ago) and denies diarrhea. She denies dysuria, difficulty urinating, hematuria, nausea, vomiting, fever, vaginal discharge or itching or any other concerns or complaints at this time. She was last sexually active in February or March of this year. Of note, the patient says she has a diagnosed cyst on her left ovary that is untreated thus far.     Past Medical History:   Diagnosis Date     Anxiety state, unspecified     [id number]     Attention deficit disorder without mention of hyperactivity      Chronic rhinitis      Depressive disorder, not elsewhere classified      Panic disorder without agoraphobia      Pure hypercholesterolemia     Lipitor     Tachycardia, unspecified     9/1999-2/2004     Type II or unspecified type diabetes mellitus without mention of complication, not stated as uncontrolled     diagnosed 2004       Past Surgical History:   Procedure Laterality Date     SALPINGO OOPHORECTOMY,R/L/KAYY  2008    Salpingo Oophorectomy, RT     SURGICAL HISTORY OF -       facial surgery d/t fall in 1/2002     SURGICAL HISTORY OF -        1985 removal of breast cyst     SURGICAL HISTORY OF -       endometrial ablation       Family History   Problem Relation Age of Onset     C.A.D. Father      DIABETES Father      Hypertension Father      CEREBROVASCULAR DISEASE Father      Psychotic Disorder Father      Pancreatic Cancer Father      C.A.D. Mother      Hypertension Mother      Breast Cancer Mother      Psychotic Disorder Mother      Colon Cancer Mother      CANCER Mother      Bone cancer     Prostate Problems Brother      cleared      Psychotic Disorder Sister      x2     Neurologic Disorder Sister      Psychotic Disorder Brother      x2     Depression Brother      major depressive disorder and OCD     Anxiety Disorder Brother      MENTAL ILLNESS Brother      Psychotic Disorder Maternal Grandmother      ?     Psychotic Disorder Maternal Grandfather      ?     Psychotic Disorder Paternal Grandmother      Schizophernia     Psychotic Disorder Paternal Grandfather      ?     Respiratory Paternal Grandfather       of emphysema and smoking     Psychotic Disorder Sister      Other - See Comments Sister      small kidney stone      Cancer - colorectal No family hx of        Social History   Substance Use Topics     Smoking status: Never Smoker     Smokeless tobacco: Never Used     Alcohol use Yes      Comment: rarely       No current facility-administered medications for this encounter.      Current Outpatient Prescriptions   Medication     amoxicillin-clavulanate (AUGMENTIN) 875-125 MG per tablet     traMADol (ULTRAM) 50 MG tablet     rivaroxaban ANTICOAGULANT (XARELTO) 20 MG TABS tablet     Digestive Enzymes (DIGESTIVE ENZYME PO)     CALCIUM-MAGNESIUM-VITAMIN D PO     Probiotic Product (PRO-BIOTIC BLEND PO)     atorvastatin (LIPITOR) 80 MG tablet     lisinopril (PRINIVIL/ZESTRIL) 10 MG tablet     metFORMIN (GLUCOPHAGE-XR) 500 MG 24 hr tablet     escitalopram (LEXAPRO) 20 MG tablet     fluticasone (FLONASE) 50 MCG/ACT nasal spray     Cholecalciferol  (VITAMIN D) 1000 UNIT capsule     UNKNOWN MED DOSAGE     MULTIVITAMIN OR     order for DME     order for DME     VYVANSE 50 MG capsule     EPINEPHrine (EPIPEN) 0.3 MG/0.3ML injection     albuterol (ALBUTEROL) 108 (90 BASE) MCG/ACT inhaler        Allergies   Allergen Reactions     Sulfa Drugs      Wasps [Hornets]          I have reviewed the Medications, Allergies, Past Medical and Surgical History, and Social History in the Epic system.    Review of Systems   Constitutional: Negative for fever.   Respiratory: Negative for shortness of breath.    Cardiovascular: Negative for chest pain.   Gastrointestinal: Positive for abdominal pain (diffuse, gale-umbilical). Negative for diarrhea, nausea and vomiting.   Genitourinary: Negative for difficulty urinating and dysuria.   All other systems reviewed and are negative.      Physical Exam   BP: 156/88  Pulse: 101  Temp: 98.2  F (36.8  C)  Resp: 24  SpO2: 100 %  Physical Exam       GEN:  Alert, well developed, no acute distress  HEENT:  PERRL, EOMI, Mucous membranes are moist.   Cardio:  RRR, there is systolic murmur (not new per patient), radial pulses equal bilaterally  PULM:  Lungs clear, good air movement, no wheezes, rales   Abd:  Soft, normal bowel sounds, there is pelvic tenderness to palpation and the abdomen is distended and somewhat firm  Pelvic Exam:  No active bleeding, there is a white/cream colored discharge, no lesions seen on speculum exam, on bimanual exam there is no CMT, no adnexal tend or mass, os is closed.  Back exam:  No CVA tenderness  Musculoskeletal:  normal range of motion, there is bilateral lower extremity swelling without calf tenderness.  The left thigh has erythema and 2 puncture marks with dried blood. The area is tender.  The swelling and erythema is over 10 cm in diameter.  Neuro:  Alert and oriented X3, Follows commands, moving all extremities spontaneously   Skin:  Warm, dry       ED Course     ED Course     Procedures       6:12 PM  The  patient was seen and examined by Dr. Ruby in Room ED08.   Patient was offered morphine for pain, however she didn t want an opiate and instead she was given Toradol. She did have good pain relief from this.         EKG Interpretation:      Interpreted by Malia Ruby  Time reviewed: 18:10  Symptoms at time of EKG: abdominal pain   Rhythm: normal sinus   Rate: normal  Axis: normal  Ectopy: none  Conduction: normal  ST Segments/ T Waves: No ST-T wave changes  Q Waves: none  Comparison to prior: Unchanged from 5/13/02    Clinical Impression: normal EKG        Critical Care time:  none   Labs are normal except as shown.  Chlamydia and gonorrhea testing is pending at this time.    Hemoglobin is stable.   CT Abdomen Pelvis w Contrast (Final result) Result time: 07/01/17 19:58:11     Final result by Jad Marin MD (07/01/17 19:58:11)     Impression:     IMPRESSION: Large cystic and solid lesion probably arising from the  right ovary/adnexa. Benign and malignant lesions are considerations in  the differential. Small amount of pelvic free fluid which is  nonspecific.      Patient was discussed with OB/GYN. The appearance of the cystic mass on the CT does have some suggestion that it might be benign. OB/GYN recommended she follow up with gynecology/oncology for further work up and treatment at this point.       Labs Ordered and Resulted from Time of ED Arrival Up to the Time of Departure from the ED   CBC WITH PLATELETS DIFFERENTIAL - Abnormal; Notable for the following:        Result Value    RBC Count 3.50 (*)     Hemoglobin 9.6 (*)     Hematocrit 30.0 (*)     All other components within normal limits   COMPREHENSIVE METABOLIC PANEL - Abnormal; Notable for the following:     Glucose 118 (*)     All other components within normal limits   ROUTINE UA WITH MICROSCOPIC REFLEX TO CULTURE - Abnormal; Notable for the following:     Ketones Urine 10 (*)     Protein Albumin Urine 100 (*)     Leukocyte Esterase  Urine Trace (*)     WBC Urine 4 (*)     RBC Urine 5 (*)     Bacteria Urine Few (*)     Squamous Epithelial /HPF Urine 4 (*)     Mucous Urine Present (*)     Granular Casts 1 (*)     All other components within normal limits   LIPASE   CHLAMYDIA TRACHOMATIS PCR   NEISSERIA GONORRHOEAE PCR   WET PREP            Assessments & Plan (with Medical Decision Making)   This is a patient with abdominal pain with a mass identified on CT scan today. It is suspected to be ovarian in origin. Patient s pain is under control, there s no sign of obstruction, she s tolerating PO, I think outpatient follow up is reasonable. Patient was advised to follow up with gynecology/oncology or with her own gynecologist this week if possible. She as advised to return if she has worsening symptoms, uncontrolled pain, vomiting, or other concerns. Patient will also be treated for cellulitis of the left leg. She was bit by her own dog four days ago and it appears she is developing a cellulitis. She was advised to follow up with her primary care physician for this as well.    This part of the document was transcribed by Ariel Jhaveri, Medical Scribe.    I have reviewed the nursing notes.    I have reviewed the findings, diagnosis, plan and need for follow up with the patient.    Discharge Medication List as of 7/1/2017  9:05 PM      START taking these medications    Details   amoxicillin-clavulanate (AUGMENTIN) 875-125 MG per tablet Take 1 tablet by mouth 2 times daily for 10 days, Disp-20 tablet, R-0, Local Print      traMADol (ULTRAM) 50 MG tablet Take 1 tablet (50 mg) by mouth every 6 hours as needed for pain maximum 4 tablet(s) per day, Disp-20 tablet, R-0, Local Print             Final diagnoses:   Abdominal pain, generalized   Pelvic mass   Cellulitis of leg, left   IAriel, am serving as a trained medical scribe to document services personally performed by Hodan Ruby MD, based on the provider's statements to me.      IHodan,  MD, was physically present and have reviewed and verified the accuracy of this note documented by Ariel Jhaveri.       7/1/2017   [facility], [place], EMERGENCY DEPARTMENT     Malia Ruby MD  07/02/17 0207

## 2017-07-01 NOTE — ED AVS SNAPSHOT
[facility], Emergency Department    [address]    [facility] [facility] [place] [place] [address]    Phone:  [phone]    Fax:  [phone]                                       Hafsa Corona   MRN: [id number]    Department:  [facility], Emergency Department   Date of Visit:  7/1/2017           Patient Information     Date Of Birth          1955        Your diagnoses for this visit were:     Abdominal pain, generalized     Pelvic mass     Cellulitis of leg, left        You were seen by Malia Ruby MD.        Discharge Instructions         Discharge Instructions for Cellulitis  You have been diagnosed with cellulitis. This is an infection in the deepest layer of the skin. In some cases, the infection also affects the muscle. Cellulitis is caused by bacteria. The bacteria can enter the body through broken skin. This can happen with a cut, scratch, animal bite, or an insect bite that has been scratched. You may have been treated in the hospital with antibiotics and fluids. You will likely be given a prescription for antibiotics to take at home. This sheet will help you take care of yourself at home.  Home care  When you are home:    Take the prescribed antibiotic medicine you are given as directed until it is gone. Take it even if you feel better. It treats the infection and stops it from returning. Not taking all the medicine can make future infections hard to treat.    Keep the infected area clean.    When possible, raise the infected area above the level of your heart. This helps keep swelling down.    Talk with your healthcare provider if you are in pain. Ask what kind of over-the-counter medicine you can take for pain.    Apply clean bandages as advised.    Take your temperature once a day for a week.    Wash your hands often to prevent spreading the infection.  In the future, wash your hands before and after you touch cuts, scratches, or bandages. This will help prevent infection.   When to call your  healthcare provider  Call your healthcare provider immediately if you have any of the following:    Difficulty or pain when moving the joints above or below the infected area    Discharge or pus draining from the area    Fever of 100.4 F (38 C) or higher, or as directed by your healthcare provider    Pain that gets worse in or around the infected     Redness that gets worse in or around the infected area, particularly if the area of redness expands to a wider area    Shaking chills    Swelling of the infected area    Vomiting   Date Last Reviewed: 8/1/2016 2000-2017 [employer]. [address] [place] [address]. All rights reserved. This information is not intended as a substitute for professional medical care. Always follow your healthcare professional's instructions.      Please make an appointment to follow up with [facility] Gynecology/Oncology or Your Primary Gynecology Provider as soon as possible or further evaluation and care.  Information for the [facility] Gynecology/Oncology is below.         Appointments and Locations   [facility]   [address]  ?[address] [place] [address]   Appointments: [phone]   Provider Referrals: [phone]   Information: [phone] Get Directions   [place]  Gynecologic Cancer [facility]   Lower Level  ?[address]  [place] [address]   Appointments: [phone]  [place]  Cancer Clinic ([facility]   Floor 2  [address] [place] [address]   Appointments: [phone]         Future Appointments        Provider Department Dept Phone Center    9/15/2017 12:15 PM Lab  Health Lab [phone] [facility]    9/15/2017 12:30 PM  HE[facility]ENTER VASCULAR ULTRASOUND ROOM 1  Health Imaging  [facility] [phone] [facility]    9/15/2017 1:30 PM William Villarreal MD  Health Bleeding and Clotting [phone] [facility]      24 Hour Appointment Hotline       To make an appointment at any [facility], call [phone] ([phone]). If you don't have a family doctor or clinic, we will help you find one. [facility] are conveniently located to serve the needs of you and your family.             Review of your medicines      START taking        Dose / Directions Last dose taken    amoxicillin-clavulanate 875-125 MG per tablet   Commonly known as:  AUGMENTIN   Dose:  1 tablet   Quantity:  20 tablet        Take 1 tablet by mouth 2 times daily for 10 days   Refills:  0        traMADol 50 MG tablet   Commonly known as:  ULTRAM   Dose:  50 mg   Quantity:  20 tablet        Take 1 tablet (50 mg) by mouth every 6 hours as needed for pain maximum 4 tablet(s) per day   Refills:  0          Our records show that you are taking the medicines listed below. If these are incorrect, please call your family doctor or clinic.        Dose / Directions Last dose taken    albuterol 108 (90 BASE) MCG/ACT Inhaler   Commonly known as:  albuterol   Dose:  2 puff   Quantity:  1 Inhaler        Inhale 2 puffs into the lungs 4 times daily as needed for shortness of breath / dyspnea   Refills:  PRN        atorvastatin 80 MG tablet   Commonly known as:  LIPITOR   Dose:  80 mg   Quantity:  90 tablet        Take 1 tablet (80 mg) by mouth daily   Refills:  PRN        CALCIUM-MAGNESIUM-VITAMIN D PO   Dose:  2 tablet        Take 2 tablets by mouth daily   Refills:  0        DIGESTIVE ENZYME PO        Take by mouth as needed   Refills:  0        EPINEPHrine 0.3 MG/0.3ML injection   Dose:  0.3 mg   Quantity:  1 each        Inject 0.3 mLs (0.3 mg) into the muscle once as needed for anaphylaxis   Refills:  PRN        escitalopram 20 MG tablet   Commonly known as:  LEXAPRO   Dose:  20 mg   Quantity:  30 tablet        Take 1 tablet (20  mg) by mouth daily   Refills:  PRN        fluticasone 50 MCG/ACT spray   Commonly known as:  FLONASE   Dose:  2 spray   Quantity:  48 g        Spray 2 sprays into both nostrils daily   Refills:  1        lisinopril 10 MG tablet   Commonly known as:  PRINIVIL/ZESTRIL   Dose:  10 mg   Quantity:  90 tablet        Take 1 tablet (10 mg) by mouth daily   Refills:  PRN        metFORMIN 500 MG 24 hr tablet   Commonly known as:  GLUCOPHAGE-XR   Quantity:  180 tablet        Take two tabs by mouth once daily with evening meal.   Refills:  PRN        MULTIVITAMIN PO        Daily.   Refills:  0        order for DME   Quantity:  1 Units        Equipment being ordered: blood pressure monitor   Refills:  0        order for DME   Quantity:  1 each        Compression stockings 20-30 mm Hg. To be wear when directed by Hematology team and while awake for the first two years post clot.   Refills:  0        PRO-BIOTIC BLEND PO   Dose:  1 capsule        Take 1 capsule by mouth daily Enzymatic Therapy-probiotic pearls   Refills:  0        rivaroxaban ANTICOAGULANT 20 MG Tabs tablet   Commonly known as:  XARELTO   Dose:  20 mg   Quantity:  30 tablet        Take 1 tablet (20 mg) by mouth daily (with dinner)   Refills:  3        UNKNOWN MED DOSAGE        vitamin B 50   Refills:  0        vitamin D 1000 UNITS capsule   Dose:  2000 Units        Take 2,000 Units by mouth daily   Refills:  0        VYVANSE 50 MG capsule   Generic drug:  lisdexamfetamine        Reported on 4/11/2017   Refills:  0                Prescriptions were sent or printed at these locations (2 Prescriptions)                   Other Prescriptions                Printed at Department/Unit printer (2 of 2)         amoxicillin-clavulanate (AUGMENTIN) 875-125 MG per tablet               traMADol (ULTRAM) 50 MG tablet                Procedures and tests performed during your visit     CBC with platelets differential    CT Abdomen Pelvis w Contrast    Chlamydia trachomatis PCR     "Comprehensive metabolic panel    EKG 12 lead    Lipase    Neisseria gonorrhoeae PCR    UA with Microscopic reflex to Culture    Wet prep      Orders Needing Specimen Collection     None      Pending Results     Date and Time Order Name Status Description    7/1/2017 1907 Neisseria gonorrhoeae PCR In process     7/1/2017 1907 Chlamydia trachomatis PCR In process     7/1/2017 1755 EKG 12 lead Preliminary             Pending Culture Results     Date and Time Order Name Status Description    7/1/2017 1907 Neisseria gonorrhoeae PCR In process     7/1/2017 1907 Chlamydia trachomatis PCR In process             Pending Results Instructions     If you had any lab results that were not finalized at the time of your Discharge, you can call the ED Lab Result RN at [phone]. You will be contacted by this team for any positive Lab results or changes in treatment. The nurses are available 7 days a week from 10A to 6:30P.  You can leave a message 24 hours per day and they will return your call.        Thank you for choosing [facility]       Thank you for choosing [facility] for your care. Our goal is always to provide you with excellent care. Hearing back from our patients is one way we can continue to improve our services. Please take a few minutes to complete the written survey that you may receive in the mail after you visit with us. Thank you!        [employer] Information     [employer] lets you send messages to your doctor, view your test results, renew your prescriptions, schedule appointments and more. To sign up, go to [email or web address] . Click on \"Log in\" on the left side of the screen, which will take you to the Welcome page. Then click on \"Sign up Now\" on the right side of the page.     You will be asked to enter the access code listed below, as well as some personal information. Please follow the directions to create your username and password.     Your access code is: [email or web address]  Expires: 7/10/2017 11:13 AM     Your " access code will  in 90 days. If you need help or a new code, please call your [facility] clinic or [phone].        Care EveryWhere ID     This is your Care EveryWhere ID. This could be used by other organizations to access your [facility] medical records  [id number]        Equal Access to Services     RONEY ARANDA : Ward mckeono Soaxel, waaxda luqadaha, qaybta kaalmada néstor, sujatha friedman. So [facility] [phone].    ATENCIÓN: Si habla español, tiene a martínez disposición servicios gratuitos de asistencia lingüística. Llame al [phone].    We comply with applicable federal civil rights laws and [place] laws. We do not discriminate on the basis of race, color, national origin, age, disability sex, sexual orientation or gender identity.            After Visit Summary       This is your record. Keep this with you and show to your community pharmacist(s) and doctor(s) at your next visit.

## 2017-07-01 NOTE — ED AVS SNAPSHOT
[facility] [place], Emergency Department    [address]    [facility] [address]    Phone:  [phone]    Fax:  [phone]                                       Hafsa Corona   MRN: [id number]    Department:  [facility], Emergency Department   Date of Visit:  7/1/2017           After Visit Summary Signature Page     I have received my discharge instructions, and my questions have been answered. I have discussed any challenges I see with this plan with the nurse or doctor.    ..........................................................................................................................................  Patient/Patient Representative Signature      ..........................................................................................................................................  Patient Representative Print Name and Relationship to Patient    ..................................................               ................................................  Date                                            Time    ..........................................................................................................................................  Reviewed by Signature/Title    ...................................................              ..............................................  Date                                                            Time

## 2017-07-02 ENCOUNTER — HOSPITAL ENCOUNTER (INPATIENT)
Facility: CLINIC | Age: 62
LOS: 9 days | Discharge: NURSING, REHAB OR LONG-TERM CARE | DRG: 737 | End: 2017-07-12
Attending: EMERGENCY MEDICINE | Admitting: OBSTETRICS & GYNECOLOGY
Payer: COMMERCIAL

## 2017-07-02 ENCOUNTER — APPOINTMENT (OUTPATIENT)
Dept: ULTRASOUND IMAGING | Facility: CLINIC | Age: 62
DRG: 737 | End: 2017-07-02
Attending: EMERGENCY MEDICINE
Payer: COMMERCIAL

## 2017-07-02 DIAGNOSIS — I82.4Z2 ACUTE DEEP VEIN THROMBOSIS (DVT) OF DISTAL VEIN OF LEFT LOWER EXTREMITY (H): ICD-10-CM

## 2017-07-02 DIAGNOSIS — L03.119 CELLULITIS AND ABSCESS OF LEG: ICD-10-CM

## 2017-07-02 DIAGNOSIS — R19.00 MASS OF PELVIS: ICD-10-CM

## 2017-07-02 DIAGNOSIS — R19.00 PELVIC MASS: ICD-10-CM

## 2017-07-02 DIAGNOSIS — L03.116 CELLULITIS OF LEFT LEG: ICD-10-CM

## 2017-07-02 DIAGNOSIS — L02.419 CELLULITIS AND ABSCESS OF LEG: ICD-10-CM

## 2017-07-02 DIAGNOSIS — L02.416 ABSCESS OF LEFT LEG: ICD-10-CM

## 2017-07-02 LAB
ALBUMIN SERPL-MCNC: 3.2 G/DL (ref 3.4–5)
ALP SERPL-CCNC: 92 U/L (ref 40–150)
ALT SERPL W P-5'-P-CCNC: 14 U/L (ref 0–50)
ANION GAP SERPL CALCULATED.3IONS-SCNC: 12 MMOL/L (ref 3–14)
AST SERPL W P-5'-P-CCNC: 27 U/L (ref 0–45)
BASOPHILS # BLD AUTO: 0 10E9/L (ref 0–0.2)
BASOPHILS NFR BLD AUTO: 0.1 %
BILIRUB SERPL-MCNC: 0.7 MG/DL (ref 0.2–1.3)
BUN SERPL-MCNC: 19 MG/DL (ref 7–30)
C TRACH DNA SPEC QL NAA+PROBE: NORMAL
CALCIUM SERPL-MCNC: 8.4 MG/DL (ref 8.5–10.1)
CHLORIDE SERPL-SCNC: 104 MMOL/L (ref 94–109)
CO2 SERPL-SCNC: 25 MMOL/L (ref 20–32)
CREAT SERPL-MCNC: 0.84 MG/DL (ref 0.52–1.04)
DIFFERENTIAL METHOD BLD: ABNORMAL
EOSINOPHIL # BLD AUTO: 0 10E9/L (ref 0–0.7)
EOSINOPHIL NFR BLD AUTO: 0.1 %
ERYTHROCYTE [DISTWIDTH] IN BLOOD BY AUTOMATED COUNT: 14.2 % (ref 10–15)
GFR SERPL CREATININE-BSD FRML MDRD: 68 ML/MIN/1.7M2
GLUCOSE SERPL-MCNC: 129 MG/DL (ref 70–99)
HCT VFR BLD AUTO: 26.8 % (ref 35–47)
HGB BLD-MCNC: 8.6 G/DL (ref 11.7–15.7)
IMM GRANULOCYTES # BLD: 0 10E9/L (ref 0–0.4)
IMM GRANULOCYTES NFR BLD: 0.1 %
INR PPP: 1.17 (ref 0.86–1.14)
INTERPRETATION ECG - MUSE: NORMAL
LACTATE BLD-SCNC: 0.7 MMOL/L (ref 0.7–2.1)
LYMPHOCYTES # BLD AUTO: 0.7 10E9/L (ref 0.8–5.3)
LYMPHOCYTES NFR BLD AUTO: 7.9 %
MCH RBC QN AUTO: 27.3 PG (ref 26.5–33)
MCHC RBC AUTO-ENTMCNC: 32.1 G/DL (ref 31.5–36.5)
MCV RBC AUTO: 85 FL (ref 78–100)
MONOCYTES # BLD AUTO: 0.5 10E9/L (ref 0–1.3)
MONOCYTES NFR BLD AUTO: 4.8 %
N GONORRHOEA DNA SPEC QL NAA+PROBE: NORMAL
NEUTROPHILS # BLD AUTO: 8.1 10E9/L (ref 1.6–8.3)
NEUTROPHILS NFR BLD AUTO: 87 %
NRBC # BLD AUTO: 0 10*3/UL
NRBC BLD AUTO-RTO: 0 /100
PLATELET # BLD AUTO: 296 10E9/L (ref 150–450)
POTASSIUM SERPL-SCNC: 4.1 MMOL/L (ref 3.4–5.3)
PROT SERPL-MCNC: 6.6 G/DL (ref 6.8–8.8)
RBC # BLD AUTO: 3.15 10E12/L (ref 3.8–5.2)
SODIUM SERPL-SCNC: 141 MMOL/L (ref 133–144)
SPECIMEN SOURCE: NORMAL
SPECIMEN SOURCE: NORMAL
WBC # BLD AUTO: 9.3 10E9/L (ref 4–11)

## 2017-07-02 PROCEDURE — 0H9JXZZ DRAINAGE OF LEFT UPPER LEG SKIN, EXTERNAL APPROACH: ICD-10-PCS | Performed by: EMERGENCY MEDICINE

## 2017-07-02 PROCEDURE — 80053 COMPREHEN METABOLIC PANEL: CPT | Performed by: EMERGENCY MEDICINE

## 2017-07-02 PROCEDURE — 87040 BLOOD CULTURE FOR BACTERIA: CPT | Performed by: EMERGENCY MEDICINE

## 2017-07-02 PROCEDURE — 96375 TX/PRO/DX INJ NEW DRUG ADDON: CPT | Performed by: EMERGENCY MEDICINE

## 2017-07-02 PROCEDURE — 85025 COMPLETE CBC W/AUTO DIFF WBC: CPT | Performed by: EMERGENCY MEDICINE

## 2017-07-02 PROCEDURE — 99285 EMERGENCY DEPT VISIT HI MDM: CPT | Mod: 25 | Performed by: EMERGENCY MEDICINE

## 2017-07-02 PROCEDURE — 25000128 H RX IP 250 OP 636: Performed by: EMERGENCY MEDICINE

## 2017-07-02 PROCEDURE — 10060 I&D ABSCESS SIMPLE/SINGLE: CPT | Performed by: EMERGENCY MEDICINE

## 2017-07-02 PROCEDURE — 85610 PROTHROMBIN TIME: CPT | Performed by: EMERGENCY MEDICINE

## 2017-07-02 PROCEDURE — 96366 THER/PROPH/DIAG IV INF ADDON: CPT | Performed by: EMERGENCY MEDICINE

## 2017-07-02 PROCEDURE — 93976 VASCULAR STUDY: CPT | Mod: XS

## 2017-07-02 PROCEDURE — 93010 ELECTROCARDIOGRAM REPORT: CPT | Mod: 59 | Performed by: EMERGENCY MEDICINE

## 2017-07-02 PROCEDURE — 10060 I&D ABSCESS SIMPLE/SINGLE: CPT | Mod: Z6 | Performed by: EMERGENCY MEDICINE

## 2017-07-02 PROCEDURE — 83605 ASSAY OF LACTIC ACID: CPT | Performed by: EMERGENCY MEDICINE

## 2017-07-02 PROCEDURE — 96365 THER/PROPH/DIAG IV INF INIT: CPT | Performed by: EMERGENCY MEDICINE

## 2017-07-02 RX ORDER — SODIUM CHLORIDE 9 MG/ML
1000 INJECTION, SOLUTION INTRAVENOUS CONTINUOUS
Status: DISCONTINUED | OUTPATIENT
Start: 2017-07-02 | End: 2017-07-03

## 2017-07-02 RX ORDER — LIDOCAINE HYDROCHLORIDE AND EPINEPHRINE 10; 10 MG/ML; UG/ML
INJECTION, SOLUTION INFILTRATION; PERINEURAL
Status: COMPLETED
Start: 2017-07-02 | End: 2017-07-03

## 2017-07-02 RX ORDER — HYDROMORPHONE HYDROCHLORIDE 1 MG/ML
0.5 INJECTION, SOLUTION INTRAMUSCULAR; INTRAVENOUS; SUBCUTANEOUS
Status: DISCONTINUED | OUTPATIENT
Start: 2017-07-02 | End: 2017-07-03

## 2017-07-02 RX ADMIN — HYDROMORPHONE HYDROCHLORIDE 0.5 MG: 1 INJECTION, SOLUTION INTRAMUSCULAR; INTRAVENOUS; SUBCUTANEOUS at 23:14

## 2017-07-02 RX ADMIN — SODIUM CHLORIDE 1000 ML: 9 INJECTION, SOLUTION INTRAVENOUS at 23:10

## 2017-07-02 RX ADMIN — VANCOMYCIN HYDROCHLORIDE 1500 MG: 500 INJECTION, POWDER, LYOPHILIZED, FOR SOLUTION INTRAVENOUS at 23:15

## 2017-07-02 ASSESSMENT — ENCOUNTER SYMPTOMS
WOUND: 1
APPETITE CHANGE: 1
FEVER: 1
DYSURIA: 1
RECTAL PAIN: 1
SHORTNESS OF BREATH: 1
ABDOMINAL PAIN: 1

## 2017-07-02 NOTE — IP AVS SNAPSHOT
"          UNIT [facility]: [phone]                                              INTERAGENCY TRANSFER FORM - LAB / IMAGING / EKG / EMG RESULTS   2017                    Hospital Admission Date: 2017  MANA BRISENO   : 1955  Sex: Female        Attending Provider: Serena Cole MD     Allergies:  Sulfa Drugs, Wasps [Hornets]    Infection:  None   Service:  GYN/ONC    Ht:  1.613 m (5' 3.5\")   Wt:  101.5 kg (223 lb 11.2 oz)   Admission Wt:  95.6 kg (210 lb 12.8 oz)    BMI:  39.01 kg/m 2   BSA:  2.13 m 2            Patient PCP Information     Provider PCP Type    Morelia Ayon NP General         Lab Results - 3 Days      Glucose by meter [[id number]] (Abnormal)  Resulted: 17 004, Result status: Final result    Ordering provider: Serena Cole MD  17 Resulting lab: POINT OF CARE TEST, GLUCOSE    Specimen Information    Type Source Collected On     17          Components       Value Reference Range Flag Lab   Glucose 121 70 - 99 mg/dL H 170            Glucose by meter [[id number]] (Abnormal)  Resulted: 17, Result status: Final result    Ordering provider: Serena Cole MD  17 Resulting lab: POINT OF CARE TEST, GLUCOSE    Specimen Information    Type Source Collected On     17          Components       Value Reference Range Flag Lab   Glucose 120 70 - 99 mg/dL H 170            Glucose by meter [[id number]] (Abnormal)  Resulted: 17 0041, Result status: Final result    Ordering provider: Serena Cole MD  17 1245 Resulting lab: POINT OF CARE TEST, GLUCOSE    Specimen Information    Type Source Collected On     17 1245          Components       Value Reference Range Flag Lab   Glucose 142 70 - 99 mg/dL H 170   Comment:  /RN Notified            Glucose by meter [[id number]] (Abnormal)  Resulted: 17 0900, Result status: Final result    Ordering provider: Serena Cole MD "  07/11/17 0834 Resulting lab: POINT OF CARE TEST, GLUCOSE    Specimen Information    Type Source Collected On     07/11/17 0834          Components       Value Reference Range Flag Lab   Glucose 144 70 - 99 mg/dL H 170            UA with Microscopic reflex to Culture [[id number]] (Abnormal)  Resulted: 07/11/17 0633, Result status: Final result    Ordering provider: Penny Quan MD  07/11/17 0358 Resulting lab: [facility]    Specimen Information    Type Source Collected On   Urine Urine Midstream 07/11/17 0617          Components       Value Reference Range Flag Lab   Color Urine Yellow   51   Appearance Urine Clear   51   Glucose Urine Negative NEG mg/dL  51   Bilirubin Urine Negative NEG  51   Ketones Urine Negative NEG mg/dL  51   Specific Gravity Urine 1.008 1.003 - 1.035  51   Blood Urine Negative NEG  51   pH Urine 5.0 5.0 - 7.0 pH  51   Protein Albumin Urine Negative NEG mg/dL  51   Urobilinogen mg/dL Normal 0.0 - 2.0 mg/dL  51   Nitrite Urine Negative NEG  51   Leukocyte Esterase Urine Negative NEG  51   Source Midstream Urine   51   WBC Urine 1 0 - 2 /HPF  51   RBC Urine 1 0 - 2 /HPF  51   Bacteria Urine Few NEG /HPF A 51   Squamous Epithelial /HPF Urine <1 0 - 1 /HPF  51   Mucous Urine Present NEG /LPF A 51            Basic metabolic panel [[id number]] (Abnormal)  Resulted: 07/11/17 0523, Result status: Final result    Ordering provider: Corry Segura MD  07/10/17 2300 Resulting lab: [facility]    Specimen Information    Type Source Collected On   Blood  07/11/17 0426          Components       Value Reference Range Flag Lab   Sodium 140 133 - 144 mmol/L  51   Potassium 3.7 3.4 - 5.3 mmol/L  51   Chloride 103 94 - 109 mmol/L  51   Carbon Dioxide 31 20 - 32 mmol/L  51   Anion Gap 6 3 - 14 mmol/L  51   Glucose 128 70 - 99 mg/dL H 51   Urea Nitrogen 14 7 - 30 mg/dL  51   Creatinine 1.00 0.52 - 1.04 mg/dL  51   GFR Estimate 56 >60  mL/min/1.7m2 L 51   Comment:  Non  GFR Calc   GFR Estimate If Black 68 >60 mL/min/1.7m2  51   Comment:  African American GFR Calc   Calcium 8.4 8.5 - 10.1 mg/dL L 51            CBC with platelets [[id number]] (Abnormal)  Resulted: 07/11/17 0459, Result status: Final result    Ordering provider: Corry Segura MD  07/10/17 2300 Resulting lab: [facility]    Specimen Information    Type Source Collected On   Blood  07/11/17 0426          Components       Value Reference Range Flag Lab   WBC 6.8 4.0 - 11.0 10e9/L  51   RBC Count 2.77 3.8 - 5.2 10e12/L L 51   Hemoglobin 7.4 11.7 - 15.7 g/dL L 51   Hematocrit 24.2 35.0 - 47.0 % L 51   MCV 87 78 - 100 fl  51   MCH 26.7 26.5 - 33.0 pg  51   MCHC 30.6 31.5 - 36.5 g/dL L 51   RDW 14.6 10.0 - 15.0 %  51   Platelet Count 370 150 - 450 10e9/L  51            Glucose by meter [[id number]] (Abnormal)  Resulted: 07/11/17 0301, Result status: Final result    Ordering provider: Serena Cole MD  07/11/17 0258 Resulting lab: POINT OF CARE TEST, GLUCOSE    Specimen Information    Type Source Collected On     07/11/17 0258          Components       Value Reference Range Flag Lab   Glucose 123 70 - 99 mg/dL H 170            Glucose by meter [[id number]] (Abnormal)  Resulted: 07/11/17 0011, Result status: Final result    Ordering provider: Serena oCle MD  07/10/17 2039 Resulting lab: POINT OF CARE TEST, GLUCOSE    Specimen Information    Type Source Collected On     07/10/17 2039          Components       Value Reference Range Flag Lab   Glucose 115 70 - 99 mg/dL H 170   Comment:  /RN Notified            Glucose by meter [[id number]] (Abnormal)  Resulted: 07/11/17 0011, Result status: Final result    Ordering provider: Serena Cole MD  07/10/17 1159 Resulting lab: POINT OF CARE TEST, GLUCOSE    Specimen Information    Type Source Collected On     07/10/17 1159          Components       Value Reference Range Flag  Lab   Glucose 123 70 - 99 mg/dL H 170            Glucose by meter [[id number]] (Abnormal)  Resulted: 07/11/17 0011, Result status: Final result    Ordering provider: Serena Cole MD  07/10/17 1706 Resulting lab: POINT OF CARE TEST, GLUCOSE    Specimen Information    Type Source Collected On     07/10/17 1706          Components       Value Reference Range Flag Lab   Glucose 105 70 - 99 mg/dL H 170            Glucose by meter [[id number]] (Abnormal)  Resulted: 07/10/17 2241, Result status: Final result    Ordering provider: Serena Cole MD  07/10/17 2231 Resulting lab: POINT OF CARE TEST, GLUCOSE    Specimen Information    Type Source Collected On     07/10/17 2231          Components       Value Reference Range Flag Lab   Glucose 117 70 - 99 mg/dL H 170   Comment:  /RN Notified            Glucose by meter [[id number]] (Abnormal)  Resulted: 07/10/17 0840, Result status: Final result    Ordering provider: Serena Cole MD  07/10/17 0806 Resulting lab: POINT OF CARE TEST, GLUCOSE    Specimen Information    Type Source Collected On     07/10/17 0806          Components       Value Reference Range Flag Lab   Glucose 103 70 - 99 mg/dL H 170            Basic metabolic panel [[id number]] (Abnormal)  Resulted: 07/10/17 0838, Result status: Final result    Ordering provider: Penny Quan MD  07/10/17 0646 Resulting lab: [facility]    Specimen Information    Type Source Collected On   Blood  07/10/17 0807          Components       Value Reference Range Flag Lab   Sodium 141 133 - 144 mmol/L  51   Potassium 4.0 3.4 - 5.3 mmol/L  51   Chloride 106 94 - 109 mmol/L  51   Carbon Dioxide 32 20 - 32 mmol/L  51   Anion Gap 3 3 - 14 mmol/L  51   Glucose 108 70 - 99 mg/dL H 51   Urea Nitrogen 18 7 - 30 mg/dL  51   Creatinine 1.17 0.52 - 1.04 mg/dL H 51   GFR Estimate 47 >60 mL/min/1.7m2 L 51   Comment:  Non  GFR Calc   GFR Estimate If Black 56 >60  mL/min/1.7m2 L 51   Comment:  African American GFR Calc   Calcium 8.6 8.5 - 10.1 mg/dL  51            CBC with platelets [[id number]] (Abnormal)  Resulted: 07/10/17 0820, Result status: Final result    Ordering provider: Penny Quan MD  07/10/17 0646 Resulting lab: [facility]    Specimen Information    Type Source Collected On   Blood  07/10/17 0807          Components       Value Reference Range Flag Lab   WBC 8.7 4.0 - 11.0 10e9/L  51   RBC Count 2.80 3.8 - 5.2 10e12/L L 51   Hemoglobin 7.6 11.7 - 15.7 g/dL L 51   Hematocrit 24.8 35.0 - 47.0 % L 51   MCV 89 78 - 100 fl  51   MCH 27.1 26.5 - 33.0 pg  51   MCHC 30.6 31.5 - 36.5 g/dL L 51   RDW 15.0 10.0 - 15.0 %  51   Platelet Count 343 150 - 450 10e9/L  51            Glucose by meter [[id number]] (Abnormal)  Resulted: 07/10/17 0246, Result status: Final result    Ordering provider: Serena Cole MD  07/10/17 0238 Resulting lab: POINT OF CARE TEST, GLUCOSE    Specimen Information    Type Source Collected On     07/10/17 0238          Components       Value Reference Range Flag Lab   Glucose 123 70 - 99 mg/dL H 170            Glucose by meter [[id number]] (Abnormal)  Resulted: 07/10/17 0236, Result status: Final result    Ordering provider: Serena Cole MD  07/08/17 1720 Resulting lab: POINT OF CARE TEST, GLUCOSE    Specimen Information    Type Source Collected On     07/08/17 1720          Components       Value Reference Range Flag Lab   Glucose 145 70 - 99 mg/dL H 170            Glucose by meter [[id number]] (Abnormal)  Resulted: 07/09/17 2041, Result status: Final result    Ordering provider: Serena Cole MD  07/09/17 2034 Resulting lab: POINT OF CARE TEST, GLUCOSE    Specimen Information    Type Source Collected On     07/09/17 2034          Components       Value Reference Range Flag Lab   Glucose 118 70 - 99 mg/dL H 170   Comment:  /RN Notified            Glucose by meter [[id number]]  (Abnormal)  Resulted: 07/09/17 1650, Result status: Final result    Ordering provider: Serena Cole MD  07/09/17 1644 Resulting lab: POINT OF CARE TEST, GLUCOSE    Specimen Information    Type Source Collected On     07/09/17 1644          Components       Value Reference Range Flag Lab   Glucose 119 70 - 99 mg/dL H 170   Comment:  Dr/RN Notified            Glucose by meter [[id number]] (Abnormal)  Resulted: 07/09/17 1205, Result status: Final result    Ordering provider: Serena Cole MD  07/09/17 1159 Resulting lab: POINT OF CARE TEST, GLUCOSE    Specimen Information    Type Source Collected On     07/09/17 1159          Components       Value Reference Range Flag Lab   Glucose 134 70 - 99 mg/dL H 170   Comment:  /RN Notified            Blood culture [[id number]]  Resulted: 07/09/17 1004, Result status: Final result    Ordering provider: Thelma Lopez MD  07/02/17 2242 Resulting lab: [facility]    Specimen Information    Type Source Collected On   Blood Arm, Left 07/02/17 2253          Components       Value Reference Range Flag Lab   Specimen Description Blood Left Arm   75   Special Requests Aerobic and anaerobic bottles received   75   Culture Micro No growth   75   Micro Report Status FINAL 07/09/2017   75            Blood culture [[id number]]  Resulted: 07/09/17 1004, Result status: Final result    Ordering provider: Thelma Lopez MD  07/02/17 2242 Resulting lab: [facility]    Specimen Information    Type Source Collected On   Blood Arm, Right 07/02/17 2302          Components       Value Reference Range Flag Lab   Specimen Description Blood Right Arm   75   Special Requests Aerobic and anaerobic bottles received   75   Culture Micro No growth   75   Micro Report Status FINAL 07/09/2017   75            Glucose by meter [[id number]] (Abnormal)  Resulted: 07/09/17 0816, Result status: Final result    Ordering  provider: Serena Cole MD  07/09/17 0803 Resulting lab: POINT OF CARE TEST, GLUCOSE    Specimen Information    Type Source Collected On     07/09/17 0803          Components       Value Reference Range Flag Lab   Glucose 182 70 - 99 mg/dL H 170   Comment:  /RN Notified            Glucose by meter [[id number]] (Abnormal)  Resulted: 07/09/17 0255, Result status: Final result    Ordering provider: Serena Cole MD  07/09/17 0204 Resulting lab: POINT OF CARE TEST, GLUCOSE    Specimen Information    Type Source Collected On     07/09/17 0204          Components       Value Reference Range Flag Lab   Glucose 139 70 - 99 mg/dL H 170            Glucose by meter [[id number]] (Abnormal)  Resulted: 07/09/17 0010, Result status: Final result    Ordering provider: Serena Cole MD  07/08/17 2153 Resulting lab: POINT OF CARE TEST, GLUCOSE    Specimen Information    Type Source Collected On     07/08/17 2153          Components       Value Reference Range Flag Lab   Glucose 133 70 - 99 mg/dL H 170            Testing Performed By     Lab - Abbreviation Name Director Address Valid Date Range    51 - Unknown [facility] Unknown [address] [facility] [address] 12/31/14 1010 - Present    75 - Unknown [facility] Unknown [address] [facility] [address] 01/15/15 1019 - Present    170 - Unknown POINT OF CARE TEST, GLUCOSE Unknown Unknown 10/31/11 1114 - Present            Unresulted Labs     None      Encounter-Level Documents:     There are no encounter-level documents.      Order-Level Documents:     There are no order-level documents.

## 2017-07-02 NOTE — IP AVS SNAPSHOT
` `     [address] [facility]: [phone]                 INTERAGENCY TRANSFER FORM - NOTES (H&P, Discharge Summary, Consults, Procedures, Therapies)   2017                    Hospital Admission Date: 2017  HAFSA CORONA   : 1955  Sex: Female        Patient PCP Information     Provider PCP Type    Morelia Ayon NP General         History & Physicals      H&P by Eli Vick MD at 7/3/2017 12:59 AM     Author:  Eli Vick MD Service:  Family Medicine Author Type:  Resident    Filed:  7/3/2017  6:49 PM Date of Service:  7/3/2017 12:59 AM Creation Time:  7/3/2017 12:58 AM    Status:  Attested :  Eli Vick MD (Resident)    Cosigner:  Darwin Antonio MD at 2017  2:13 PM        Attestation signed by Darwin Antonio MD at 2017  2:13 PM        Attestation:  This patient has been seen and evaluated by meDarwin on 7/3/17.  I saw and discussed the case with the primary resident and the care team. I agree with the findings and plan in this note. I have reviewed today's vital signs, medications, laboratory results.  Darwin Antonio MD  [facility]          .                                                                                  [KC1.1]                                                 **Please see addendum at bottom of note**[ST1.1]    [facility]  Inpatient History and Physical    Hafsa Corona MRN# [id number]   Age: 62 year old YOB: 1955     7/3/2017 12:58 AM    Home clinic:[KC1.1] [facility][KC1.2]  Primary care provider: Morelia Ayon          Chief Concern:[KC1.1]   Worsening pelvic and abdominal pain       History is obtained from the patient[KC1.2]          History of Present Illness (Resident / Clinician):   Hafsa Corona is a 62 year old[KC1.1] female with history of DM-2, anxiety, HLD, chronic rhinitis, right oophorectomy (), DVT ([KC1.2]diagnosed 17[KC1.3]) on Xarelto  "who presented to the ED for evaluation of pelvic pain.[KC1.2]    Hafsa reports that over the last month she has had increasing sensation of \"bloating and gas\" in her abdomen. She never really experienced any pain. She thought that her abdominal distension was just gas and started taking some digestive aids. But 2 days ago, she experienced severe pain in her lower abdomen. She went to the ED and was found to have a large mass. She was discharged home with instructions to follow up with gyn-onc outpatient. Due to worsening pain, decreased appetite, 2 episodes of bladder incontinence, she returned to the ED. She also endorses pressure and urge to defecate but has not had a \"good bm\". Feels constipated. Some anal pain. Denies any blood in stool. No nausea/vomiting. Reports having trouble breathing because of the distension. Also reports having a slight cough, has had fever of about 100.3 F.     Over the last year, patient has grown progressively more fatigued. She has had several emotional struggles- including family, financial crises. She also reports struggles with mental health.     She has been pregnant twice but had miscarriages both times. Has been off and on birth control pills. No vaginal bleeding since menopause. Has not had any abnormal pap smears till date.[KC1.3]     In addition, she also is currently being managed for a dog bite that occurred 5 days ago. She was discharged from the ED on 07/01 with a course of Augmentin but due to her ongoing symptoms, she has not been able to fill the script. She presented to the ED today with a large red lump that was incised and drained in the ED. She was febrile and tachycardic and hence was started on antibiotics while in the ED.     Old re[KC1.2]cords were reviewed, and any additions to pertinent history are noted above.           Past Medical History:     Past Medical History:   Diagnosis Date     Anxiety state, unspecified     [id number]     Attention deficit " disorder without mention of hyperactivity      Chronic rhinitis      Depressive disorder, not elsewhere classified      Panic disorder without agoraphobia      Pure hypercholesterolemia     Lipitor     Tachycardia, unspecified     1999-2004     Type II or unspecified type diabetes mellitus without mention of complication, not stated as uncontrolled     diagnosed              Past Surgical History:     Past Surgical History:   Procedure Laterality Date     SALPINGO OOPHORECTOMY,R/L/KAYY      Salpingo Oophorectomy, RT     SURGICAL HISTORY OF -       facial surgery d/t fall in 2002     SURGICAL HISTORY OF -        removal of breast cyst     SURGICAL HISTORY OF -       endometrial ablation             Social History:[KC1.1]     Social History   Substance Use Topics     Smoking status: Never Smoker     Smokeless tobacco: Never Used     Alcohol use Yes      Comment: rarely[KC1.4]             Family History:[KC1.1]     Family History   Problem Relation Age of Onset     C.A.D. Father      DIABETES Father      Hypertension Father      CEREBROVASCULAR DISEASE Father      Psychotic Disorder Father      Pancreatic Cancer Father      C.A.D. Mother      Hypertension Mother      Breast Cancer Mother      Psychotic Disorder Mother      Colon Cancer Mother      CANCER Mother      Bone cancer     Prostate Problems Brother      cleared      Psychotic Disorder Sister      x2     Neurologic Disorder Sister      Psychotic Disorder Brother      x2     Depression Brother      major depressive disorder and OCD     Anxiety Disorder Brother      MENTAL ILLNESS Brother      Psychotic Disorder Maternal Grandmother      ?     Psychotic Disorder Maternal Grandfather      ?     Psychotic Disorder Paternal Grandmother      Schizophernia     Psychotic Disorder Paternal Grandfather      ?     Respiratory Paternal Grandfather       of emphysema and smoking     Psychotic Disorder Sister      Other - See Comments Sister       small kidney stone      Cancer - colorectal No family hx of[KC1.4]      Family history reviewed[KC1.2]            Immunizations:[KC1.1]     Immunization History   Administered Date(s) Administered     Influenza (IIV3) 11/09/2006, 10/11/2007, 11/21/2008, 09/29/2009, 01/10/2013     Influenza Vaccine IM 3yrs+ 4 Valent IIV4 11/19/2013, 02/04/2016, 09/06/2016     Influenza Vaccine, 3 YRS +, IM (QUADRIVALENT W/PRESERVATIVES) 11/21/2014     Pneumococcal 23 valent 11/19/2013     TDAP Vaccine (Adacel) 02/04/2016[KC1.5]             Allergies:   Sulfa drugs and Wasps [hornets]          Medications:     No current facility-administered medications on file prior to encounter.   Current Outpatient Prescriptions on File Prior to Encounter:  rivaroxaban ANTICOAGULANT (XARELTO) 20 MG TABS tablet Take 1 tablet (20 mg) by mouth daily (with dinner)   Digestive Enzymes (DIGESTIVE ENZYME PO) Take by mouth as needed    CALCIUM-MAGNESIUM-VITAMIN D PO Take 2 tablets by mouth daily   Probiotic Product (PRO-BIOTIC BLEND PO) Take 1 capsule by mouth daily Enzymatic Therapy-probiotic pearls   atorvastatin (LIPITOR) 80 MG tablet Take 1 tablet (80 mg) by mouth daily   lisinopril (PRINIVIL/ZESTRIL) 10 MG tablet Take 1 tablet (10 mg) by mouth daily   metFORMIN (GLUCOPHAGE-XR) 500 MG 24 hr tablet Take two tabs by mouth once daily with evening meal.   escitalopram (LEXAPRO) 20 MG tablet Take 1 tablet (20 mg) by mouth daily   VYVANSE 50 MG capsule Reported on 4/11/2017   fluticasone (FLONASE) 50 MCG/ACT nasal spray Spray 2 sprays into both nostrils daily   Cholecalciferol (VITAMIN D) 1000 UNIT capsule Take 2,000 Units by mouth daily    MULTIVITAMIN OR Daily.   amoxicillin-clavulanate (AUGMENTIN) 875-125 MG per tablet Take 1 tablet by mouth 2 times daily for 10 days   traMADol (ULTRAM) 50 MG tablet Take 1 tablet (50 mg) by mouth every 6 hours as needed for pain maximum 4 tablet(s) per day   order for DME Compression stockings 20-30 mm Hg. To be wear  when directed by Hematology team and while awake for the first two years post clot.   order for DME Equipment being ordered: blood pressure monitor   EPINEPHrine (EPIPEN) 0.3 MG/0.3ML injection Inject 0.3 mLs (0.3 mg) into the muscle once as needed for anaphylaxis   albuterol (ALBUTEROL) 108 (90 BASE) MCG/ACT inhaler Inhale 2 puffs into the lungs 4 times daily as needed for shortness of breath / dyspnea   UNKNOWN MED DOSAGE vitamin B 50            Review of Systems:   Review of Systems   Constitutional: Positive for[KC1.1] appetite change[KC1.3],[KC1.1] fatigue[KC1.3] and[KC1.1] fever[KC1.3].   HENT: Negative for[KC1.1] congestion[KC1.3],[KC1.1] rhinorrhea[KC1.3] and[KC1.1] sore throat[KC1.3].    Eyes: Negative for[KC1.1] visual disturbance[KC1.3].   Respiratory: Positive for[KC1.1] cough[KC1.3] and[KC1.1] shortness of breath[KC1.3].    Cardiovascular: Positive for[KC1.1] chest pain (occasional chest pain with exertion, has had cardiac workup for CAD in the past which was negative)[KC1.3] and[KC1.1] palpitations (tachycardia)[KC1.3].   Gastrointestinal: Positive for[KC1.1] abdominal pain[KC1.3] and[KC1.1] constipation[KC1.3]. Negative for[KC1.1] nausea[KC1.3] and[KC1.1] vomiting[KC1.3].   Genitourinary: Positive for[KC1.1] pelvic pain[KC1.3]. Negative for[KC1.1] dysuria[KC1.3],[KC1.1] frequency[KC1.3] and[KC1.1] hematuria[KC1.3].   Skin: Positive for[KC1.1] wound (left thigh)[KC1.3].   Neurological: Negative for[KC1.1] dizziness[KC1.3],[KC1.1] syncope[KC1.3],[KC1.1] light-headedness[KC1.3] and[KC1.1] headaches[KC1.3].               Physical Exam:[KC1.1]   Vitals were reviewed[KC1.2]  Temp: 100.9  F (38.3  C) Temp src: Oral BP: 124/83 Pulse: 124   Resp: 16 SpO2: 93 % O2 Device: None (Room air)[KC1.5]    Physical Exam   Constitutional: She is[KC1.1] oriented to person, place, and time[KC1.3]. She appears[KC1.1] well-developed[KC1.3] and[KC1.1] well-nourished[KC1.3].[KC1.1] No distress[KC1.3].   HENT:   Head:[KC1.1]  Normocephalic[KC1.3] and[KC1.1] atraumatic[KC1.3].   Right Ear:[KC1.1] External ear[KC1.3] normal.   Left Ear:[KC1.1] External ear[KC1.3] normal.   Mouth/Throat:[KC1.1] Oropharynx is clear and moist[KC1.3]. No[KC1.1] oropharyngeal exudate[KC1.3].   Eyes:[KC1.1] Conjunctivae[KC1.3] and[KC1.1] EOM[KC1.3] are normal.[KC1.1] No scleral icterus[KC1.3].   Neck:[KC1.1] Normal range of motion[KC1.3].   Cardiovascular:[KC1.1] Normal heart sounds[KC1.3].[KC1.1]  Tachycardia[KC1.3] present.    Pulmonary/Chest:[KC1.1] Effort normal[KC1.3] and[KC1.1] breath sounds normal[KC1.3]. No[KC1.1] respiratory distress[KC1.3]. She has[KC1.1] no wheezes[KC1.3]. She has[KC1.1] no rales[KC1.3].   Abdominal:[KC1.1] Soft[KC1.3]. She exhibits[KC1.1] distension[KC1.3] and[KC1.1] mass[KC1.3]. There is[KC1.1] tenderness[KC1.3] in the[KC1.1] periumbilical area[KC1.3],[KC1.1] suprapubic area[KC1.3] and[KC1.1] left lower quadrant[KC1.3]. There is[KC1.1] no rebound[KC1.3] and[KC1.1] no guarding[KC1.3].   Musculoskeletal:[KC1.1] Normal range of motion[KC1.3]. She exhibits[KC1.1] edema (left lower extremity swollen as compared to right)[KC1.3].   Lymphadenopathy:     She has[KC1.1] no cervical adenopathy[KC1.3].   Neurological: She is[KC1.1] alert[KC1.3] and[KC1.1] oriented to person, place, and time[KC1.3]. No[KC1.1] cranial nerve deficit[KC1.3].   Psychiatric: Her mood appears[KC1.1] anxious[KC1.3]. She[KC1.1] exhibits a depressed mood[KC1.3]. She expresses[KC1.1] no suicidal[KC1.3] ideation. She expresses[KC1.1] no suicidal plans[KC1.3].[KC1.1]   Cries easily[KC1.3]              Data:     Results for orders placed or performed during the hospital encounter of 07/02/17 (from the past 24 hour(s))   CBC with platelets differential   Result Value Ref Range    WBC 9.3 4.0 - 11.0 10e9/L    RBC Count 3.15 (L) 3.8 - 5.2 10e12/L    Hemoglobin 8.6 (L) 11.7 - 15.7 g/dL    Hematocrit 26.8 (L) 35.0 - 47.0 %    MCV 85 78 - 100 fl    MCH 27.3 26.5 - 33.0 pg    MCHC  32.1 31.5 - 36.5 g/dL    RDW 14.2 10.0 - 15.0 %    Platelet Count 296 150 - 450 10e9/L    Diff Method Automated Method     % Neutrophils 87.0 %    % Lymphocytes 7.9 %    % Monocytes 4.8 %    % Eosinophils 0.1 %    % Basophils 0.1 %    % Immature Granulocytes 0.1 %    Nucleated RBCs 0 0 /100    Absolute Neutrophil 8.1 1.6 - 8.3 10e9/L    Absolute Lymphocytes 0.7 (L) 0.8 - 5.3 10e9/L    Absolute Monocytes 0.5 0.0 - 1.3 10e9/L    Absolute Eosinophils 0.0 0.0 - 0.7 10e9/L    Absolute Basophils 0.0 0.0 - 0.2 10e9/L    Abs Immature Granulocytes 0.0 0 - 0.4 10e9/L    Absolute Nucleated RBC 0.0    INR   Result Value Ref Range    INR 1.17 (H) 0.86 - 1.14   Comprehensive metabolic panel   Result Value Ref Range    Sodium 141 133 - 144 mmol/L    Potassium 4.1 3.4 - 5.3 mmol/L    Chloride 104 94 - 109 mmol/L    Carbon Dioxide 25 20 - 32 mmol/L    Anion Gap 12 3 - 14 mmol/L    Glucose 129 (H) 70 - 99 mg/dL    Urea Nitrogen 19 7 - 30 mg/dL    Creatinine 0.84 0.52 - 1.04 mg/dL    GFR Estimate 68 >60 mL/min/1.7m2    GFR Estimate If Black 83 >60 mL/min/1.7m2    Calcium 8.4 (L) 8.5 - 10.1 mg/dL    Bilirubin Total 0.7 0.2 - 1.3 mg/dL    Albumin 3.2 (L) 3.4 - 5.0 g/dL    Protein Total 6.6 (L) 6.8 - 8.8 g/dL    Alkaline Phosphatase 92 40 - 150 U/L    ALT 14 0 - 50 U/L    AST 27 0 - 45 U/L   Lactic acid whole blood   Result Value Ref Range    Lactic Acid 0.7 0.7 - 2.1 mmol/L   US Pelvic Complete w Transvaginal & Abd/Pel Duplex Limited    Narrative    US PELVIS COMPLETE WITH TRANSVAGINAL AND DOPPLER LIMITED    7/3/2017  12:18 AM     HISTORY: Pelvic pain. Status post right oophorectomy.    TECHNIQUE: Transvaginal images were performed to better evaluate the  patient's uterus, ovaries and endometrial stripe. Spectral Doppler and  wave form analysis was also performed to evaluate blood flow to the  ovaries.    COMPARISON: CT of the abdomen and pelvis performed 7/1/2017.    FINDINGS: The uterus is measured at 8.4 x 6.2 x 4.1 cm. No  fibroids  are evident. Endometrial stripe could not be visualized. The right  ovary is not seen, consistent with history of recent right  oophorectomy. The left ovary is unremarkable. There is a large complex  pelvic mass, difficult to measure due to its large size. The origin of  this mass could not be identified on this exam and this finding was  better seen on CT. No free pelvic fluid is present. Spectral Doppler  and waveform analysis demonstrate blood flow to the left ovary.      Impression    IMPRESSION:  1. Large complex pelvic mass is difficult to measure due to its large  size and the origin of this mass is not identified on this exam.   2. There is a large amount of slightly complex free fluid in the  pelvis.[KC1.1]      All cardiac studies reviewed by me.   All imaging studies reviewed by me.[KC1.3]        Assessment and Plan:   Assessment:   Hasfa Corona is a 62 year old[KC1.1] female with history of DM-2, anxiety, HLD, chronic rhinitis, right oophorectomy (2008), DVT ([KC1.2]diagnosed 06/05/17[KC1.3]) on Xarelto who[KC1.2] presented with pelvic pain, worsening abdominal distension and found to have a large pelvic mass of unclear etiology and origin. Being admitted for further evaluation of this mass.[KC1.3]   Patient Active Problem List   Diagnosis     Attention deficit disorder     PANIC DISORDER      VASOMOTOR RHINITIS     Chronic Maxillary Sinusitis     Tachycardia     Type 2 diabetes mellitus without complication (H)     HYPERLIPIDEMIA LDL GOAL <100     Allergic rhinitis due to other allergen     BMI > 35     Essential hypertension     Lumbago     Major depressive disorder, recurrent episode, mild (H)     Long-term (current) use of anticoagulants [Z79.01]     Deep vein thrombosis (DVT) (H) [I82.409]         Plan:[KC1.1]   ##Left lower extremity abscess s/p I & D, possibly complicated with cellulitis  Patient now 5 days out of the dog bite. As per patient, her dog was not uptodate on rabies shot but  she has been in touch with animal services and does not believe that her go has rabies. She presented to the ED with fever, tachycardic. Her labs including CBC, CMP and lactic acid were normal except for a normocytic anemia. Due to concerns for[KC1.2] c[KC1.6]ellulitis, patient was started on broad spectrum antibiotics. Plan to continue them for another 24 hrs. De-escalate pending clinical improvement.[KC1.2] Patient up to date on tetanus.[KC1.6]      -Vitals Q4H  -Continue IV vancomycin and zosyn (started 07/02)  -Wound care consult  -Leg elevation  -Pain relief with[KC1.2] IV dilaudid 0.3-0.5 mg Q4H PRN, tylenol 1000 mg Q8H PRN[KC1.3]  -IV Zofran for nausea[KC1.2]  -Follow up blood cultures[KC1.7]    ##Pelvic pain secondary to the Large mass originating from the right adnexa[KC1.2]  CT scan suggestive of large cystic and solid lesion arising from the right ovary/adnexa. The origin of the mass in unclear. But the mass effect of the lesion in addition to the ascites is likely the source of patient's symptoms. She does not have a leukocytosis, her kidney and liver function is normal on admission. Abdomen exam is not suggestive of an acute abdomen- no rebound, guarding, peritonitic signs. Possible that the constipation is due to compression effects of the mass.[KC1.3]   -Gyn-onc consult in the AM[KC1.2]  -Consider surgery consult as well  -Pain relief as above  -Consider paracentesis for diagnostic purposes and for symptom relief    ##Left lower extremity DVT  Diagnosed in early June 2017. Patient initially on warfarin but then started on xarelto. Due to being in the ED, she has missed her evening dose on 07/02. Discussed medication kinetics with inpatient pharmacist. Recommend giving the dose in morning, then repeating in the evening and then switching to morning thereafter. If a potential surgery is planned, patient will have to be off Xarelto for 48 hrs.   -[KC1.3] Will hold Xarelto pending  procedure[KC1.8]    ##Normocytic anemia  Patient's baseline hemoglobin is around 10. Her hemoglobin has been down trending over the last month. DDx include malignancy (chronic disease) vs nutritional deficiency. No active signs or symptoms of bleeding.  -Iron studies ordered  -Hemolysis labs ordered.   -Peripheral blood smear to look at RBC morphology.     Chronic stable conditions:[KC1.3]   ##[KC1.2]HTN- Cont PTA lisinopril[KC1.3]   ##Type 2 DM, HgbA1c 6 on 17  -Hold home metformin while patient in hospital  -Start[KC1.2] M[KC1.3]SSI correction while inpatient  ##[KC1.2] HLD- Cont PTA lipitor  ##Depression- Cont PTA lexapro[KC1.3]    Daily cares -   F:[KC1.1] NS @ 75 cc/hr[KC1.3]  E:[KC1.1]wnl[KC1.3]  N:[KC1.1]NPO[KC1.3]  Lines:[KC1.1]PIV[KC1.3]   Activity:[KC1.1]-Up as tolerated[KC1.3]  CODE:[KC1.1]Full Code[KC1.3]  Prophylaxis:[KC1.1] On xarelto[KC1.3]  PCP communication:  - Morelia Ayon  Dispo: Expected discharge date[KC1.1] 2-3 days pending clinical improvement, diagnostic workup.      Discussed with faculty but not examined by faculty.[KC1.3]  Yolande Shankar MD[KC1.1]    ADDENDUM  Assessment:  Hafsa is a 61 yo F with h/o right oopherectomy (), DVT (diagnosed 2017) on xarelto, DM2, HLD, admitted for evaluation of pelvic mass on CT concerning for malignancy and management of LUE abscess s/p I&D (7/3)    Pertinent OB/GYN in addition to H&P above:  [id number]-mi of 2 elective abortions  Endometrial ablation for menorrhagia 2/2 uterine fibroids ()  Right salpingo-oopherectomy 2/2 endometrioma in   Menopause in  (following endometrial ablation), no h/o HRT    Plan:  ##Pelvic Mass  ##Pelvic Pain, abdominal distension   CT scan concerning for malignancy of gyn origin. Gyn/onc consulted and given appearance on imaging combined with patient's symptoms, recommend surgical management for both treatment and diagnosis. Gyn/onc to follow daily.     - f/u   - currently on OR schedule for  Friday 7/7/2017 for exploratory laparotomy, bilateral SPO, possible cancer staging including hysterectomy, pelvic/para-aortic lymphadenopathy, omentectomy, peritoneal biopsies. Gyn/onc team will discuss this plan with patient today  -per gyn/onc, will need IVC filter placed prior to surgery. Will plan to have this done Wednesday by IR  -hold Xarelto 24hrs prior to surgery. No need to hold Xarelto prior to IVC filter per IR  -pain control with IV dilaudid 0.3-0.5mg q4h prn, tylenol 1000mg q8h prn  -zofran for nausea  -optimize medically prior to surgery    ##difficulty with deep inspiration, cough  Hafsa reports difficulty with deep inspiration, likely due to compression on diaphragm by mass preventing full lung expansion.     -CXR to rule out pneumonia  -incentive spirometry     ##Left upper extremity abscess s/p I & D, possibly complicated with cellulitis  Due to dog bite incident[ST1.2] on 6/27.[ST1.3]  Patient up to date on tetanus. Started on broad-spectrum abx     -Continue IV vancomycin and zosyn (started 07/02) for now. Plan to de-escalate to PO augmentin possibly tomorrow   -Wound care consult  -Leg elevation  -Pain relief with IV dilaudid 0.3-0.5 mg Q4H PRN, tylenol 1000 mg Q8H PRN  -IV Zofran for nausea[ST1.2]    ##Left lower extremity DVT  Diagnosed in early June 2017. Patient initially on warfarin but then started on xarelto. May be secondary to venous compression by pelvic mass causing venous stasis vs malignancy vs hypercoagulable state  -continue xarelto for now  -will need to be held 24hr prior to surgery  -no need to hold xarelto prior to IVC filter placement per IR[ST1.3]    ##Normocytic anemia  Patient's baseline hemoglobin is around 10. Her hemoglobin has been down trending over the last month. DDx include malignancy (chronic disease) vs nutritional deficiency. No active signs or symptoms of bleeding.  -Iron studies show low iron and iron saturation, normal TIBC and transferrin. Will start PO  iron replacement  -Hemolysis labs ordered. Will follow up  -Peripheral blood smear to look at RBC morphology.     Chronic stable conditions:   ##HTN- Cont PTA lisinopril   ##Type 2 DM, HgbA1c 6 on 04/11/17  -Hold home metformin while patient in hospital  -Start MSSI correction while inpatient  ## HLD- Cont PTA lipitor  ##Depression- Cont PTA lexapro    Daily cares -   F: PO  E:wnl  N:regular diet  Lines:PIV   Activity:-Up as tolerated  CODE:Full Code  Prophylaxis: On xarelto  PCP communication:  - Morelia Ayon  Dispo: Expected discharge date pending recovery from surgery, scheduled for Friday 7/7    Patient seen and plan discussed with staff Dr. Darwin Vick MD  Family Medicine PGY2[ST1.2]                Revision History        User Key Date/Time User Provider Type Action    > ST1.3 7/3/2017  6:49 PM Eli Vick MD Resident Sign     [N/A] 7/3/2017  5:08 PM Eli Vick MD Resident Sign     ST1.1 7/3/2017  5:08 PM Eli Vick MD Resident Sign     ST1.2 7/3/2017  4:39 PM Eli Vick MD Resident      KC1.8 7/3/2017  6:54 AM Yolande Shankar MD Resident Sign     KC1.7 7/3/2017  5:44 AM Yolande Shankar MD Resident      KC1.6 7/3/2017  5:40 AM Yolande Shankar MD Resident      KC1.3 7/3/2017  4:34 AM Yolande Shankar MD Resident      KC1.5 7/3/2017  1:21 AM Yolnade Shankar MD Resident      KC1.4 7/3/2017  1:20 AM Yolande Shankar MD Resident      KC1.2 7/3/2017  1:16 AM Yolande Shankar MD Resident      KC1.1 7/3/2017 12:58 AM Yolande Shankar MD Resident             H&P by Serena Cole MD at 7/3/2017 11:23 AM     Author:  Serena Cole MD Service:  Gyn/Onc Author Type:  Physician    Filed:  7/3/2017  3:42 PM Date of Service:  7/3/2017 11:23 AM Creation Time:  7/3/2017 10:32 AM    Status:  Addendum :  Serena Cole MD (Physician)         [facility]: Gynecology Oncology Consult    Hafsa Corona MRN# [id number]   Age: 62 year  "old YOB: 1955     Date of Admission:  2017    Primary care provider: Morelia Ayon             Chief Complaint:   Abdominal pain and distention         History of Present Illness:   This patient is a 62 year old female with PMH of DM type II and past surgical history pertinent for[DF1.1] RSO[KB1.1] for endometrotic cyst of the ovary who presented with sudden onset of[DF1.1] severe[KB1.1] diffuse abdominal pain[DF1.1] that began yesterday evening. Patient also reports a[KB1.1] one month history of[DF1.1] abdominal[KB1.1] bloating and distention. She was originally seen in the ED on 17[DF1.1] for severe abdominal pain[KB1.1] and was found to have a [id number] pelvis mass- she was told to follow up with gyn[DF1.1] oncology as an outpatient[KB1.1].[DF1.1] On  pt had another episode of severe[KB1.1] abdominal pain along with 2[KB1.2]  episodes of urinary incontinence[DF1.1] prompting her to return[KB1.2] to the ED[DF1.1]. She was admitted by the family medicine service for further workup. She reports continued[KB1.2] diffuse abdominal pain that, controlled with IV dilaudid. She has also experienced anorexia over the past 2 days and feeling more tired that usual. Some SOB due to[DF1.1] \"[KB1.2]increased pressure on the lungs from her abdomen[DF1.1]\"[KB1.2]. Her stools have been[DF1.1] \"hard and small\"[KB1.2] over the past 2 days, her last normal stool was Saturday morning. She denies chest pain, palpitations, dysuria, hematuria, vaginal bleeding or vaginal discharge. Of note she was diagnosed with a[DF1.1] LLE DVT on 17. This was unprovoked. Originally[KB1.2] started on warfarin but then was switched to xarelto[DF1.1] by Hematologist[KB1.2].[DF1.1]    Ob/Gyn History   - Hx of 2 elective abortions  Endometrial ablation for menorrhagia 2/2 uterine fibroids in   RSO 2/2[KB1.2] endometrioma[DT1.1] in 2007[KB1.2].[DT1.1]  Menopause in 2008 after endometrial ablation, no " HRT.[KB1.2]  Longest OCP use was 4 years.   F[DF1.1]H: Mother w/ breast CA and colon CA, father with pancreatic CA. No FH of female reproductive CA[KB1.2]    Preventative screening has been done in combination with her primary care provider and her primary Ob/Gyn.  She receives regular gynecologic care through [facility] and her last visit was in 2015. She still has her left ovary, uterus and cervix. She was told in 2015 that she had a cyst on her left ovary. She reports no abnormal pap smears, last one was in 2015. Negative colonoscopy in 2013. Normal mammogram in 2014.          Cancer Treatment History:   N/A         Past Medical History:     Past Medical History:   Diagnosis Date     Anxiety state, unspecified     [id number]     Attention deficit disorder without mention of hyperactivity      Chronic rhinitis      Depressive disorder, not elsewhere classified      Panic disorder without agoraphobia      Pure hypercholesterolemia     Lipitor     Tachycardia, unspecified     9/1999-2/2004     Type II or unspecified type diabetes mellitus without mention of complication, not stated as uncontrolled     diagnosed 2004            Past Surgical History:      Past Surgical History:   Procedure Laterality Date     SALPINGO OOPHORECTOMY,R/L/KAYY  2008    Salpingo Oophorectomy, RT     SURGICAL HISTORY OF -       facial surgery d/t fall in 1/2002     SURGICAL HISTORY OF -       1985 removal of breast cyst     SURGICAL HISTORY OF -   2008    endometrial ablation            Social History:     Social History   Substance Use Topics     Smoking status: Never Smoker     Smokeless tobacco: Never Used     Alcohol use Yes      Comment: rarely            Family History:     Family History   Problem Relation Age of Onset     C.A.D. Father      DIABETES Father      Hypertension Father      CEREBROVASCULAR DISEASE Father      Psychotic Disorder Father      Pancreatic Cancer Father      C.A.D. Mother      Hypertension Mother      Breast Cancer  Mother      Psychotic Disorder Mother      Colon Cancer Mother      CANCER Mother      Bone cancer     Prostate Problems Brother      cleared      Psychotic Disorder Sister      x2     Neurologic Disorder Sister      Psychotic Disorder Brother      x2     Depression Brother      major depressive disorder and OCD     Anxiety Disorder Brother      MENTAL ILLNESS Brother      Psychotic Disorder Maternal Grandmother      ?     Psychotic Disorder Maternal Grandfather      ?     Psychotic Disorder Paternal Grandmother      Schizophernia     Psychotic Disorder Paternal Grandfather      ?     Respiratory Paternal Grandfather       of emphysema and smoking     Psychotic Disorder Sister      Other - See Comments Sister      small kidney stone      Cancer - colorectal No family hx of             Immunizations:     Immunization History   Administered Date(s) Administered     Influenza (IIV3) 2006, 10/11/2007, 2008, 2009, 01/10/2013     Influenza Vaccine IM 3yrs+ 4 Valent IIV4 2013, 2016, 2016     Influenza Vaccine, 3 YRS +, IM (QUADRIVALENT W/PRESERVATIVES) 2014     Pneumococcal 23 valent 2013     TDAP Vaccine (Adacel) 2016            Allergies:     Allergies   Allergen Reactions     Sulfa Drugs      Wasps [Hornets]             Medications:     Current Facility-Administered Medications   Medication     albuterol (PROAIR HFA/PROVENTIL HFA/VENTOLIN HFA) Inhaler 2 puff     atorvastatin (LIPITOR) tablet 80 mg     cholecalciferol (vitamin D) tablet 2,000 Units     escitalopram (LEXAPRO) tablet 20 mg     fluticasone (FLONASE) 50 MCG/ACT spray 2 spray     lisinopril (PRINIVIL/ZESTRIL) tablet 10 mg     naloxone (NARCAN) injection 0.1-0.4 mg     Patient is already receiving anticoagulation with heparin, enoxaparin (LOVENOX), warfarin (COUMADIN)  or other anticoagulant medication     0.9% sodium chloride infusion     senna-docusate (SENOKOT-S;PERICOLACE) 8.6-50 MG per tablet 1-2  tablet     ondansetron (ZOFRAN-ODT) ODT tab 4 mg    Or     ondansetron (ZOFRAN) injection 4 mg     glucose 40 % gel 15-30 g    Or     dextrose 50 % injection 25-50 mL    Or     glucagon injection 1 mg     insulin aspart (NovoLOG) inj (RAPID ACTING)     insulin aspart (NovoLOG) inj (RAPID ACTING)     piperacillin-tazobactam (ZOSYN) 3.375 g vial to attach to  mL bag     vancomycin (VANCOCIN) 1,500 mg in NaCl 0.9 % 250 mL intermittent infusion     HYDROmorphone (PF) (DILAUDID) injection 0.3-0.5 mg     acetaminophen (TYLENOL) tablet 1,000 mg     rivaroxaban ANTICOAGULANT (XARELTO) tablet 20 mg     HYDROmorphone (PF) (DILAUDID) injection 0.5 mg[DF1.1]     Current Outpatient Prescriptions on File Prior to Encounter:  rivaroxaban ANTICOAGULANT (XARELTO) 20 MG TABS tablet Take 1 tablet (20 mg) by mouth daily (with dinner)   Digestive Enzymes (DIGESTIVE ENZYME PO) Take by mouth as needed    CALCIUM-MAGNESIUM-VITAMIN D PO Take 2 tablets by mouth daily   Probiotic Product (PRO-BIOTIC BLEND PO) Take 1 capsule by mouth daily Enzymatic Therapy-probiotic pearls   atorvastatin (LIPITOR) 80 MG tablet Take 1 tablet (80 mg) by mouth daily   lisinopril (PRINIVIL/ZESTRIL) 10 MG tablet Take 1 tablet (10 mg) by mouth daily   metFORMIN (GLUCOPHAGE-XR) 500 MG 24 hr tablet Take two tabs by mouth once daily with evening meal.   escitalopram (LEXAPRO) 20 MG tablet Take 1 tablet (20 mg) by mouth daily   VYVANSE 50 MG capsule Reported on 4/11/2017   fluticasone (FLONASE) 50 MCG/ACT nasal spray Spray 2 sprays into both nostrils daily   Cholecalciferol (VITAMIN D) 1000 UNIT capsule Take 2,000 Units by mouth daily    MULTIVITAMIN OR Daily.   amoxicillin-clavulanate (AUGMENTIN) 875-125 MG per tablet Take 1 tablet by mouth 2 times daily for 10 days   traMADol (ULTRAM) 50 MG tablet Take 1 tablet (50 mg) by mouth every 6 hours as needed for pain maximum 4 tablet(s) per day   order for DME Compression stockings 20-30 mm Hg. To be wear when  "directed by Hematology team and while awake for the first two years post clot.   order for DME Equipment being ordered: blood pressure monitor   EPINEPHrine (EPIPEN) 0.3 MG/0.3ML injection Inject 0.3 mLs (0.3 mg) into the muscle once as needed for anaphylaxis   albuterol (ALBUTEROL) 108 (90 BASE) MCG/ACT inhaler Inhale 2 puffs into the lungs 4 times daily as needed for shortness of breath / dyspnea   UNKNOWN MED DOSAGE vitamin B 50[KB1.3]              Review of Systems:   CONSTITUTIONAL:  positive for[DF1.1]  f[KB1.2]michael most likely due to recent dog bite, fatigue, anorexia[DF1.1].[KB1.2] No unintentional weight loss.  RESPIRATORY:  positive for SOB   CARDIOVASCULAR:[DF1.1]  Negative for chest pain, palpitations[KB1.2]  GASTROINTESTINAL:  positive for constipation, abdominal mas[DF1.1]s,[KB1.2] abdominal distention[DF1.1], and abdominal pain.[KB1.2]   GENITOURINARY:  negative for frequency, dysuria, nocturia and hematuria. Patient had 2 episodes of urinary incontinence in the last 2 days.   HEMATOLOGIC/LYMPHATIC:  negative for easy bruising and bleeding  ENDOCRINE:  positive for history of well controlled diabetes. Per patient reports last HA1C was <7%.          Physical Exam:[DF1.1]     Vitals:    07/03/17 0324 07/03/17 0335 07/03/17 0556 07/03/17 1123   BP: 132/79  118/75 118/73   BP Location: Right arm  Right arm    Pulse: 102  98 98   Resp: 16  18 20   Temp: 98.8  F (37.1  C)  99.3  F (37.4  C) 99.9  F (37.7  C)   TempSrc: Oral  Oral Oral   SpO2: 93%  93%    Weight:  92.4 kg (203 lb 12.8 oz)     Height:  1.613 m (5' 3.5\")[KB1.4]       General: in no acute distress, pleasant and interactive  CV: RRR,[DF1.1] systolic ejection murmur best heard at RUSB.[KB1.2]   Resp:[DF1.1] Mildly labored breathing when talking. Intermittent wheezes heart in right lung base, clear with continued deep breathing. No crackles.[KB1.2]   Abdomen:[DF1.1] D[KB1.2]istended[DF1.1], tympanitic[KB1.2] abdomen[DF1.1]. Firm mass palpable in " the LLQ. Mild tenderness to palpation in lower quadrants. Bowel sounds present.[KB1.2]   :[DF1.1] Normal appearing external genitalia. Cervix appears normal without visible lesions. Scant white discharge from cervical os. Large, firm mass palpable on bimanual exam in the LLQ. No tenderness with bimanual exam.[KB1.2]   Extremities: 2+left LE edema in the ankle and calf. No erythema present near bite site on left lateral thigh, wound covered by bandage. 1+ right LE edema          Data:     Results for orders placed or performed during the hospital encounter of 07/02/17 (from the past 24 hour(s))   Blood culture   Result Value Ref Range    Specimen Description Blood Left Arm     Special Requests Aerobic and anaerobic bottles received     Culture Micro No growth after 3 hours     Micro Report Status Pending    CBC with platelets differential   Result Value Ref Range    WBC 9.3 4.0 - 11.0 10e9/L    RBC Count 3.15 (L) 3.8 - 5.2 10e12/L    Hemoglobin 8.6 (L) 11.7 - 15.7 g/dL    Hematocrit 26.8 (L) 35.0 - 47.0 %    MCV 85 78 - 100 fl    MCH 27.3 26.5 - 33.0 pg    MCHC 32.1 31.5 - 36.5 g/dL    RDW 14.2 10.0 - 15.0 %    Platelet Count 296 150 - 450 10e9/L    Diff Method Automated Method     % Neutrophils 87.0 %    % Lymphocytes 7.9 %    % Monocytes 4.8 %    % Eosinophils 0.1 %    % Basophils 0.1 %    % Immature Granulocytes 0.1 %    Nucleated RBCs 0 0 /100    Absolute Neutrophil 8.1 1.6 - 8.3 10e9/L    Absolute Lymphocytes 0.7 (L) 0.8 - 5.3 10e9/L    Absolute Monocytes 0.5 0.0 - 1.3 10e9/L    Absolute Eosinophils 0.0 0.0 - 0.7 10e9/L    Absolute Basophils 0.0 0.0 - 0.2 10e9/L    Abs Immature Granulocytes 0.0 0 - 0.4 10e9/L    Absolute Nucleated RBC 0.0    INR   Result Value Ref Range    INR 1.17 (H) 0.86 - 1.14   Comprehensive metabolic panel   Result Value Ref Range    Sodium 141 133 - 144 mmol/L    Potassium 4.1 3.4 - 5.3 mmol/L    Chloride 104 94 - 109 mmol/L    Carbon Dioxide 25 20 - 32 mmol/L    Anion Gap 12 3 - 14  mmol/L    Glucose 129 (H) 70 - 99 mg/dL    Urea Nitrogen 19 7 - 30 mg/dL    Creatinine 0.84 0.52 - 1.04 mg/dL    GFR Estimate 68 >60 mL/min/1.7m2    GFR Estimate If Black 83 >60 mL/min/1.7m2    Calcium 8.4 (L) 8.5 - 10.1 mg/dL    Bilirubin Total 0.7 0.2 - 1.3 mg/dL    Albumin 3.2 (L) 3.4 - 5.0 g/dL    Protein Total 6.6 (L) 6.8 - 8.8 g/dL    Alkaline Phosphatase 92 40 - 150 U/L    ALT 14 0 - 50 U/L    AST 27 0 - 45 U/L   Lactic acid whole blood   Result Value Ref Range    Lactic Acid 0.7 0.7 - 2.1 mmol/L   Blood culture   Result Value Ref Range    Specimen Description Blood Right Arm     Special Requests Aerobic and anaerobic bottles received     Culture Micro No growth after 3 hours     Micro Report Status Pending        CT ABDOMEN AND PELVIS WITH CONTRAST 7/1/2017 7:42 PM      HISTORY: Abdominal pain, fullness.     COMPARISON: None.     TECHNIQUE: Volumetric helical acquisition of CT images from the lung  bases through the symphysis pubis after the administration of 100 mL  Isovue 370  intravenous contrast. Radiation dose for this scan was  reduced using automated exposure control, adjustment of the mA and/or  kV according to patient size, or iterative reconstruction technique.     FINDINGS: There is a 25.1 x 20.4 x 16.5 cm cystic mass in the abdomen  and pelvis. There is a suggestion of more complex partially enhancing  9.8 x 12.7 x 9.5 cm. This presumably is arising from the right  ovary/adnexa given the mass effect on the adjacent uterus. A normal  right or left ovary is not well seen. Small amount of free fluid.  Appendix unremarkable. No bowel obstruction. No free air. No  hydronephrosis. Kidneys, adrenal glands, spleen, and liver demonstrate  no worrisome focal lesion. Some increased soft tissue attenuation is  in the omentum, particularly the left upper quadrant. This could be  related to the ascites but omental infiltration is not excluded. Lung  base is clear of acute infiltrates or masses. No frankly  destructive  bony lesions.         IMPRESSION: Large cystic and solid lesion probably arising from the  right ovary/adnexa. Benign and malignant lesions are considerations in  the differential. Small amount of pelvic free fluid which is  nonspecific.      CHELSEA VINES MD    US Pelvic Complete w Transvaginal & Abd/Pel Duplex Limited    Narrative    US PELVIS COMPLETE WITH TRANSVAGINAL AND DOPPLER LIMITED    7/3/2017  12:18 AM     HISTORY: Pelvic pain. Status post right oophorectomy.    TECHNIQUE: Transvaginal images were performed to better evaluate the  patient's uterus, ovaries and endometrial stripe. Spectral Doppler and  wave form analysis was also performed to evaluate blood flow to the  ovaries.    COMPARISON: CT of the abdomen and pelvis performed 7/1/2017.    FINDINGS: The uterus is measured at 8.4 x 6.2 x 4.1 cm. No fibroids  are evident. Endometrial stripe could not be visualized. The right  ovary is not seen, consistent with history of recent right  oophorectomy. The left ovary is unremarkable. There is a large complex  pelvic mass, difficult to measure due to its large size. The origin of  this mass could not be identified on this exam and this finding was  better seen on CT. No free pelvic fluid is present. Spectral Doppler  and waveform analysis demonstrate blood flow to the left ovary.      Impression    IMPRESSION:  1. Large complex pelvic mass is difficult to measure due to its large  size, and the origin of this mass is not identified on this exam. This  finding was better visualized on the recent CT scan.   2. There is a large amount of slightly complex free fluid in the  pelvis.    NIRALI AMBRIZ MD   Incision and drainage    Narrative    Thelma Lopez MD     7/3/2017  1:04 AM  Incision + drainage  Date/Time: 7/3/2017 1:03 AM  Performed by: THELMA LOPEZ  Authorized by: THELMA LOPEZ   Consent: Verbal consent obtained.  Risks and benefits: risks, benefits and alternatives  "were discussed  Consent given by: patient  Time out: Immediately prior to procedure a \"time out\" was called to verify   the correct patient, procedure, equipment, support staff and site/side   marked as required.  Type: abscess  Body area: lower extremity  Location details: left leg  Anesthesia: local infiltration    Anesthesia:  Anesthesia: local infiltration  Local Anesthetic: lidocaine 1% with epinephrine   Anesthetic total: 5 mL  Sedation:  Patient sedated: no    Risk factor: coagulopathy  Scalpel size: 11  Incision type: single straight  Incision depth: dermal  Complexity: simple  Drainage: serosanguinous  Drainage amount: scant  Wound treatment: wound left open  Packing material: 1/4 in iodoform gauze  Patient tolerance: Patient tolerated the procedure well with no immediate   complications     UA with Microscopic   Result Value Ref Range    Color Urine Yellow     Appearance Urine Slightly Cloudy     Glucose Urine Negative NEG mg/dL    Bilirubin Urine Negative NEG    Ketones Urine 40 (A) NEG mg/dL    Specific Gravity Urine 1.020 1.003 - 1.035    Blood Urine Trace (A) NEG    pH Urine 5.0 5.0 - 7.0 pH    Protein Albumin Urine 30 (A) NEG mg/dL    Urobilinogen mg/dL Normal 0.0 - 2.0 mg/dL    Nitrite Urine Negative NEG    Leukocyte Esterase Urine Negative NEG    Source Clean catch urine     WBC Urine 2 0 - 2 /HPF    RBC Urine 5 (H) 0 - 2 /HPF    Bacteria Urine Moderate (A) NEG /HPF    Mucous Urine Present (A) NEG /LPF    Amorphous Crystals Few (A) NEG /HPF   Glucose by meter   Result Value Ref Range    Glucose 136 (H) 70 - 99 mg/dL   Glucose by meter   Result Value Ref Range    Glucose 116 (H) 70 - 99 mg/dL   Basic metabolic panel   Result Value Ref Range    Sodium 140 133 - 144 mmol/L    Potassium 3.9 3.4 - 5.3 mmol/L    Chloride 106 94 - 109 mmol/L    Carbon Dioxide 26 20 - 32 mmol/L    Anion Gap 8 3 - 14 mmol/L    Glucose 112 (H) 70 - 99 mg/dL    Urea Nitrogen 17 7 - 30 mg/dL    Creatinine 0.88 0.52 - 1.04 mg/dL "    GFR Estimate 65 >60 mL/min/1.7m2    GFR Estimate If Black 78 >60 mL/min/1.7m2    Calcium 8.5 8.5 - 10.1 mg/dL   CBC with platelets differential   Result Value Ref Range    WBC 8.7 4.0 - 11.0 10e9/L    RBC Count 3.18 (L) 3.8 - 5.2 10e12/L    Hemoglobin 8.4 (L) 11.7 - 15.7 g/dL    Hematocrit 27.0 (L) 35.0 - 47.0 %    MCV 85 78 - 100 fl    MCH 26.4 (L) 26.5 - 33.0 pg    MCHC 31.1 (L) 31.5 - 36.5 g/dL    RDW 14.6 10.0 - 15.0 %    Platelet Count 296 150 - 450 10e9/L    Diff Method Automated Method     % Neutrophils 71.5 %    % Lymphocytes 16.7 %    % Monocytes 10.8 %    % Eosinophils 0.6 %    % Basophils 0.2 %    % Immature Granulocytes 0.2 %    Nucleated RBCs 0 0 /100    Absolute Neutrophil 6.2 1.6 - 8.3 10e9/L    Absolute Lymphocytes 1.5 0.8 - 5.3 10e9/L    Absolute Monocytes 0.9 0.0 - 1.3 10e9/L    Absolute Eosinophils 0.1 0.0 - 0.7 10e9/L    Absolute Basophils 0.0 0.0 - 0.2 10e9/L    Abs Immature Granulocytes 0.0 0 - 0.4 10e9/L    Absolute Nucleated RBC 0.0              Assessment and Plan:   Assessment: 62 year old female with abdominal pain and distension with 25.1x20.4x16.5[DF1.1] cm[KB1.2] pelvic mass found on CT admitted for workup[DF1.1] and further management of pelvic mass.[KB1.2]      Reccomendations[KB1.5]:[DF1.1]  # Pelvic mass: Imaging highly concerning for gyn origin. CA-125 pending. DDx benign, borderline, and malignant masses. Given appearance on imaging and patient symptomatology, recommend surgical management for treatment definitive diagnosis.   - Focus on optimizing patient's comorbid conditions in light of pending surgery[KB1.5]     #LLE DVT: May be 2/2 venous compression due to large pelvic mass[KB1.6] vs hypercoagulability due to malignancy[DT1.1].   - Okay to continue Xarelto for now, however w[KB1.6]ill need to hold x24 hours prior to surgical management[DT1.1]    ID: s/p dog bite 6/27 with resulting lower extremity abscess. Resolving on IV antibiotics.[DT1.2]    Gyn Onc team will continue  to follow daily, please do not hesitate to contact us with any questions or concerns.[KB1.5]     Corry Segura[KB1.7], MD  Ob/Gyn PGY-1  Gyn Onc pager: [phone][KB1.8]  07/03/17 12:22 PM[KB1.7]        I have seen and examined the patient.  I have reviewed and edited Dr. Segura's note above.[DT1.1]  Plan to go to the OR[DT1.3] Friday 7/7/17 for[DT1.4] exploratory laparotomy, bilateral salpingo-oophorectomy; possible cancer staging including hysterectomy, pelvic/para-aortic lymphadenectomy, omentectomy, peritoneal biopsies.[DT1.3]  Will need IVC filter placed prior to surgery.  Will need to hold anticoagulation x24 hours prior to surgery.[DT1.4]    Serena Cole  7/3/2017  3:36 PM[DT1.5]           Revision History        User Key Date/Time User Provider Type Action    > DT1.2 7/3/2017  3:42 PM Serena Cole MD Physician Addend     DT1.4 7/3/2017  3:40 PM Serena Cole MD Physician Addend     DT1.5 7/3/2017  3:36 PM Serena Cole MD Physician Sign     DT1.3 7/3/2017  3:33 PM Serena Cole MD Physician      DT1.1 7/3/2017  3:22 PM Serena Cole MD Physician Incomplete Revision     [N/A] 7/3/2017  2:15 PM Naty Pinzon MD Resident Sign     KB1.6 7/3/2017  1:30 PM Corry Segura MD Resident Sign     KB1.5 7/3/2017 12:32 PM Corry Segura MD Resident Sign     KB1.7 7/3/2017 12:23 PM Corry Segura MD Resident Share     KB1.8 7/3/2017 12:15 PM Corry Segura MD Resident      KB1.4 7/3/2017 12:00 PM Corry Segura MD Resident Share     KB1.3 7/3/2017 11:54 AM Corry Segura MD Resident      KB1.2 7/3/2017 11:48 AM Corry Segura MD Resident      KB1.1 7/3/2017 11:28 AM Corry Segura MD Resident Share     DF1.1 7/3/2017 11:23 AM Anusha Kearns Medical Student Share                     Discharge Summaries      Discharge Summaries by Corry Segura MD at 7/10/2017  8:38 AM     Author:  Corry Segura MD Service:  Gyn/Onc Author Type:  Resident    Filed:  7/12/2017  11:40 AM Date of Service:  7/10/2017  8:38 AM Creation Time:  7/10/2017  7:56 AM    Status:  Cosign Needed :  Corry Segura MD (Resident)    Cosign Required:  Yes             Gynecologic Oncology Discharge Summary    Hafsa Corona  [id number]    Admit Date: 7/2/2017  Discharge Date:[DF1.1] 7/11/2017[KB1.1]  Admitting Provider: Dr. Cole  Discharge Provider:[DF1.1] Dr. 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  -[KB1.2] LLE DVT  - T2DM  - LLE cellulitis 2/2 dog bite  - Acute on chronic anemia  - HTN[KB1.1]    Patient Active Problem List   Diagnosis     Attention deficit disorder     PANIC DISORDER      VASOMOTOR RHINITIS     Chronic Maxillary Sinusitis     Tachycardia     Type 2 diabetes mellitus without complication (H)     HYPERLIPIDEMIA LDL GOAL <100     Allergic rhinitis due to other allergen     BMI > 35     Essential hypertension     Lumbago     Major depressive disorder, recurrent episode, mild (H)     Long-term (current) use of anticoagulants [Z79.01]     Deep vein thrombosis (DVT) (H) [I82.409]     Pelvic mass in female     S/P laparotomy[KB1.3]       Procedures: XL, HELENE, LSO, omentectomy, evacuation of abdominal fluid, PPALND    Prior to Admission Medications:  Prescriptions Prior to Admission   Medication Sig Dispense Refill Last Dose     Multiple Vitamins-Minerals (MULTIVITAMIN ADULT PO) Take 1 tablet by mouth daily   7/2/2017 at unknown     vitamin B complex with vitamin C (VITAMIN  B COMPLEX) TABS tablet Take 1 tablet by mouth daily   Past Week at Unknown time     Lisdexamfetamine Dimesylate (VYVANSE PO) Take 50 mg by mouth daily   hasnt started yet at hasnt started yet     rivaroxaban ANTICOAGULANT (XARELTO) 20 MG TABS tablet Take 1 tablet (20 mg) by mouth daily (with dinner) 30 tablet 3 7/1/2017 at Unknown time      Digestive Enzymes (DIGESTIVE ENZYME PO) Take 1 capsule by mouth as needed    Past Week at Unknown time     CALCIUM-MAGNESIUM-VITAMIN D PO Take 2 tablets by mouth daily   7/2/2017 at Unknown time     Probiotic Product (PRO-BIOTIC BLEND PO) Take 1 capsule by mouth daily Enzymatic Therapy-probiotic pearls   7/2/2017 at Unknown time     atorvastatin (LIPITOR) 80 MG tablet Take 1 tablet (80 mg) by mouth daily 90 tablet PRN 7/1/2017 at Unknown time     lisinopril (PRINIVIL/ZESTRIL) 10 MG tablet Take 1 tablet (10 mg) by mouth daily 90 tablet PRN 7/2/2017 at Unknown time     metFORMIN (GLUCOPHAGE-XR) 500 MG 24 hr tablet Take two tabs by mouth once daily with evening meal. 180 tablet PRN 7/1/2017 at Unknown time     escitalopram (LEXAPRO) 20 MG tablet Take 1 tablet (20 mg) by mouth daily 30 tablet PRN 7/2/2017 at unknown     fluticasone (FLONASE) 50 MCG/ACT nasal spray Spray 2 sprays into both nostrils daily 48 g 1 Past Week at Unknown time     Cholecalciferol (VITAMIN D) 1000 UNIT capsule Take 2,000 Units by mouth daily    7/1/2017 at Unknown time     traMADol (ULTRAM) 50 MG tablet Take 1 tablet (50 mg) by mouth every 6 hours as needed for pain maximum 4 tablet(s) per day 20 tablet 0 hasnt started yet at hasnt started yet     order for DME Compression stockings 20-30 mm Hg. To be wear when directed by Hematology team and while awake for the first two years post clot. 1 each 0 Taking     order for DME Equipment being ordered: blood pressure monitor 1 Units 0 Taking     EPINEPHrine (EPIPEN) 0.3 MG/0.3ML injection Inject 0.3 mLs (0.3 mg) into the muscle once as needed for anaphylaxis 1 each PRN never used at never used     albuterol (ALBUTEROL) 108 (90 BASE) MCG/ACT inhaler Inhale 2 puffs into the lungs 4 times daily as needed for shortness of breath / dyspnea 1 Inhaler PRN More than a month at Unknown time       Discharge Medications:[DF1.1]   Hafsa Corona   Home Medication Instructions AMIE:[id number]    Printed  on:07/11/17 0923   Medication Information                      acetaminophen (TYLENOL) 325 MG tablet  Take 2 tablets (650 mg) by mouth every 6 hours as needed for mild pain             albuterol (ALBUTEROL) 108 (90 BASE) MCG/ACT inhaler  Inhale 2 puffs into the lungs 4 times daily as needed for shortness of breath / dyspnea             atorvastatin (LIPITOR) 80 MG tablet  Take 1 tablet (80 mg) by mouth daily             CALCIUM-MAGNESIUM-VITAMIN D PO  Take 2 tablets by mouth daily             Cholecalciferol (VITAMIN D) 1000 UNIT capsule  Take 2,000 Units by mouth daily              Digestive Enzymes (DIGESTIVE ENZYME PO)  Take 1 capsule by mouth as needed              EPINEPHrine (EPIPEN) 0.3 MG/0.3ML injection  Inject 0.3 mLs (0.3 mg) into the muscle once as needed for anaphylaxis             escitalopram (LEXAPRO) 20 MG tablet  Take 1 tablet (20 mg) by mouth daily             fluticasone (FLONASE) 50 MCG/ACT nasal spray  Spray 2 sprays into both nostrils daily             HYDROmorphone (DILAUDID) 4 MG tablet  Take 1-1.5 tablets (4-6 mg) by mouth every 3 hours as needed for moderate to severe pain             Lisdexamfetamine Dimesylate (VYVANSE PO)  Take 50 mg by mouth daily             lisinopril (PRINIVIL/ZESTRIL) 10 MG tablet  Take 1 tablet (10 mg) by mouth daily             metFORMIN (GLUCOPHAGE-XR) 500 MG 24 hr tablet  Take two tabs by mouth once daily with evening meal.             Multiple Vitamins-Minerals (MULTIVITAMIN ADULT PO)  Take 1 tablet by mouth daily             order for DME  Equipment being ordered: blood pressure monitor             order for DME  Compression stockings 20-30 mm Hg. To be wear when directed by Hematology team and while awake for the first two years post clot.             polyethylene glycol (MIRALAX/GLYCOLAX) Packet  Take 17 g by mouth daily             Probiotic Product (PRO-BIOTIC BLEND PO)  Take 1 capsule by mouth daily Enzymatic Therapy-probiotic pearls              rivaroxaban ANTICOAGULANT (XARELTO) 20 MG TABS tablet  Take 1 tablet (20 mg) by mouth daily (with dinner)             senna-docusate (SENOKOT-S;PERICOLACE) 8.6-50 MG per tablet  Take 2 tablets by mouth 2 times daily Start with 1 tablet PO BID, If no bowel movement in 24 hours, increase to 2 tablets PO BID.  Hold for loose stools.             vitamin B complex with vitamin C (VITAMIN  B COMPLEX) TABS tablet  Take 1 tablet by mouth daily[KB1.3]                  Consultations:  Medicine primary HD[DF1.1]#[KB1.1]1-HD[DF1.1]#6[KB1.1]  P[DF1.1]hysical therapy[KB1.1]  Social work   Wound care[DF1.1] nurse  Lymphedema nurse[KB1.1]    Brief History of Illness:  Hafsa Corona is a 62 year old female who presented on 7/3/17 with abdominal pain and distention, history of left LE DVT diagnosed in early June 2017, and recent dog bite on upper left thigh complicated by cellulitis and abscess formation. Shortly before the current admission she was found to have a large pelvic mass with  of 1187[DF1.1]. S[KB1.1]he has a history of RSO in 2007. She was followed and managed by family medicine from HD1- HD[DF1.1]6[KB1.1].[DF1.1] Pelvic mass was[KB1.1] removed by Gyn Onc on HD6. She remained on the gyn onc service for her post operative care.    Hospital Course:  Dz: Stage 1C borderline ovarian tumor    - Preoperative diagnosis was left ovarian mass confirmed by CT.  Frozen section at the time of the surgery showed borderline ovarian tumor[DF1.1] that had ruptured prior to surgery.[KB1.1]  Final pathology is pending at the time of discharge.  She will follow-up postoperatively for a care plan.  FEN:   - She was maintained on IVF until POD#1, when her diet was slowly advanced.  By discharge, she was tolerating a regular diet without nausea and vomiting and able to maintain her hydration without IVF supplementation.  Pain:   -[DF1.1] Postoperative pain was initially managed with bupivacaine epidural, IV dilaudid, and Tylenol.  Dilaudid PCA was started on POD#0 given poor pain control with the above interventions. Gabapentin was added on POD#2. On POD#3 patient[KB1.1] transitioned to PO pain medications[DF1.1].[KB1.1] Her pain was well controlled on this and she was discharged home with these medications.  CV:   - She has a history of[DF1.1] HTN &[KB1.1] HLD managed with[DF1.1] lisinopril and[KB1.1] pravastatin[DF1.1] respectively[KB1.1], th[DF1.1]missy[KB1.1] w[DF1.1]ere[KB1.1] continued throughout admission.[DF1.1] She had mild tachycardia in the immediate postoperative period, this resolved with fluids. S[KB1.1]he had no acute CV issues.  PULM:   - She has no history of pulmonary issues[DF1.1], although pt was noted to have shortness of breath and wheezing on admission[KB1.1] that was managed with prn albuterol. CXR was unremarkable on admission. She was given O2 supplementation in the postoperative period. On POD#3 pt underwent ambulating oxygen assessment with PT and was noted to desaturate below 90% on room air. She will be discharged to the TCU with supplemental oxygen and will follow up as an outpatient for further workup and management.[KB1.4] She was encouraged to use her bedside IS while in house  HEME:   - Her preoperative Hgb was 9.4.  Her hgb was 9.5 postoperatively[DF1.1] and stabilized at 8.4. Pt has known left lower extremity DVT diagnosed on 6/5/17, managed with Xarelto per hematology.[KB1.4] This was discontinued 24 hours before[DF1.1] the[KB1.4] procedure and restarted on POD[DF1.1]#[KB1.4]2[DF1.1] after epidural removal[KB1.4].[DF1.1] She will follow up with her hematologist for further management of her DVT. IR was consulted preoperatively for IVC filter placement, however pt was not a candidate given venous compression by the tumor.[KB1.4]   GI:   -[DF1.1] S[KB1.4]he was made NPO prior to the procedure.  On POD#0, her diet was advanced to clear liquids and then advanced slowly as tolerated.  At the time of discharge,  she was tolerating a regular diet without nausea and vomiting.  She was started on miralax and senna-docusate post op will be discharged with a bowel regimen to prevent constipation in the postoperative period.  She had no acute GI issues while in house.  :    - A rand catheter was placed at the time of the surgery.  Once ambulating unassisted, the rand catheter was removed.  Prior to discharge, the patient was voiding spontaneously without difficulty.[DF1.1] Pt was noted to have urinary incontinence on POD#2. UA was obtained, which was negative for infection, and PVR was 0 mL. She had no acute  issues while in house.[KB1.4]   ID:   -[DF1.1] Pt diagnosed with left thigh cellulitis with abscess formation 2/2 dog bite on admission. She underwent I&D in the ED. This was initially managed with IV Zosyn and Vancomycin per family medicine. She was transitioned to PO Augmentin for a total of 7 day course. This was completed on 7/10/17.[KB1.4] The wound was packed and dressed in clean bandages throughout admission[DF1.1] and she was seen by [facility] nurse. Pt was initially febrile on presentation to the ED, but this resolved with initiation of IV antibiotics.[KB1.4]   ENDO:   - History of T2DM,[DF1.1] managed with Metformin. T[KB1.4]his was discontinued throughout the admission. She was put on medium[DF1.1] SSI[KB1.4] for management of her blood sugars during her hospital sta[DF1.1]y with good control. Metformin will be restarted on discharge.   MSK:   - Pt developed bilateral lower extremity edema in the postoperative period. Noted to be net positive 3L during the admission. Lymphedema nurse was consulted for lower extremity wraps for patient comfort.[KB1.4]   PSYCH/NEURO:   - History of depression/anxiety, home lexapro was continued throughout admission.[DF1.1] She was seen by health psychology during her admission.[KB1.4]   PPX:    - Xarelto was continued throughout admission[DF1.1] as noted above.[KB1.4] She was  given[DF1.1] RLE[KB1.4] SCD[DF1.1] and[KB1.4] IS during her hospital course.  She tolerated these prophylactic interventions without incident.      Discharge Instructions and Follow up:  Ms. Hafsa Corona was discharged from the hospital with follow up[DF1.1] with [KB1.4] Edmund[KB1.5] in 2 weeks to discuss final pathology and further treatment planning[KB1.4].    Discharge Diet:[DF1.1] Regular[KB1.5]  Discharge Activity:[DF1.1] Activity as tolerated[KB1.5]  Discharge Follow up:[DF1.1] In 2 weeks as above[KB1.5]    Discharge Disposition:[DF1.1]  Discharged to rehabilitation facility[KB1.5]    Discharge Staff:[DF1.1] Dr. Pack[KB1.5]    Corry Segura[KB1.6], MD  Ob/gyn PGY-1[KB1.5]  07/11/17 9:50 AM[KB1.6]        Revision History        User Key Date/Time User Provider Type Action    > [N/A] 7/12/2017 11:40 AM Corry Segura MD Resident Sign     KB1.6 7/11/2017  9:50 AM Corry Segura MD Resident Share     KB1.5 7/11/2017  9:49 AM Corry Segura MD Resident      KB1.4 7/11/2017  9:48 AM Corry Segura MD Resident Share     KB1.3 7/11/2017  9:33 AM Corry Segura MD Resident Share     KB1.1 7/11/2017  9:21 AM Corry Segura MD Resident      [N/A] 7/11/2017  8:06 AM Anusha Kearns Medical Student Share     KB1.2 7/10/2017 10:10 AM Corry Segura MD Resident Share     DF1.1 7/10/2017  8:38 AM Anusha Kearns Medical Student Share                     Consult Notes      Consults by Alecia Mina, PhD LP at 7/10/2017 11:00 AM     Author:  Alecia Mina, PhD LP Service:  Health Psychology Author Type:  Psychologist    Filed:  7/11/2017  7:59 AM Date of Service:  7/10/2017 11:00 AM Creation Time:  7/10/2017 10:58 AM    Status:  Signed :  Alecia Mina, PhD LP (Psychologist)             Health Psychology                  Clinic    Department of Medicine  Alecia Mina, Ph.D., L.P. [phone]                          [facility]  [facility] Casimiro Renae,  "Ph.D.,  L.P. [phone]                 3rd Floor  [place] Mail Code 747   Darwin Linton, Ph.D., RIAZ.ИРИНА.WADE.P., L.P. [phone] [address] Fiorella Han, Ph.D., L.P. [phone]            [id number]  [place] [address]           Christine Kebede, Ph.D., L.P. [phone]     Inpatient Health Psychology Consultation*    DATE OF SERVICE:  07/10/2017    REFERRAL SOURCE:  Eli Vick MD, [facility] team, [address], [facility].    Clinical Information:[DB1.1]  Met with patient per her request to provide emotional support  following her surgery Friday. She reports that once pain got under control that things have felt much better. Notes that it felt as if she needed to be a very strong advocate for herself re the pain management as the recommended approaches were not working well for her, including epidural and then shift to oral medication that was not as effective as needed. Now feeling that pain is under control and has an appetite, able to be up and active, walking in the halls, working with PT. Feels that she is doing well emotionally in general, allowing herself to \"just be\" and let her body rest and recover as much as possible. Feels very well supported by providers and staff.   We talked about the importance of putting attention on what she can and cannot control and making the choice to use her energy for the things that she has the possibility of changing. She talked about the shifts in dynamics with her siblings and how she appreciates that a crisis like this can provide an opportunity for these kinds of shifts. Working with SW on TCU placement. Appreciative of recommendation for referral to community provider. Asked for my contact information and this was provided. Affect euthymic.[DB1.2]  Assessment:[DB1.1] Doing very well emotionally in context of recent diagnosis of mass, surgery, uncertainty of " specific diagnosis, and history of anxiety and depression. Appears remarkably calm and grounded, staying in present, appreciative of support and care. Asking clear questions to gain information needed to continue managing emotional reactions effectively.[DB1.3]  Diagnosis:    1.  Dysthymic disorder (F34.1) (based on information from psychiatrist found in Care Everywhere).  2.  Major depressive disorder, single episode, moderate (F32.1).    3.  Generalized anxiety disorder (F41.1).  4.  Attention deficit disorder without mention of hyperactivity[DB1.1] ([DB1.3]by history[DB1.1])[DB1.3].  5.  History of panic disorder.   Recommendations/Plan:[DB1.1] Ms Corona will make contact with community provider to establish care following discharge from community TCU. No other specific recommendations from a psychological perspective prior to discharge.[DB1.3]  Time Spent with Patient: 57 minutes  Service Provided: Individual psychotherapy   Provider: Alecia Mina, Ph.D, LP   Provider Pager: [phone]   Provider Phone: [phone]    Alecia Mina PhD, LP[DB1.1]            Revision History        User Key Date/Time User Provider Type Action    > DB1.3 7/11/2017  7:59 AM Alecia Mina PhD BENJAMIN Psychologist Sign     DB1.2 7/10/2017 12:01 PM Alecia Mina, PhD BENJAMIN Psychologist      DB1.1 7/10/2017 10:58 AM Alecia Mina, PhD BENJAMIN Psychologist             Consults by Alecia Mina PhD LP at 7/6/2017 12:00 AM     Author:  Alecia Mina, PhD BENJAMIN Service:  Health Psychology Author Type:  Psychologist    Filed:  7/10/2017  9:11 AM Date of Service:  7/6/2017 12:00 AM Creation Time:  7/8/2017  7:36 AM    Status:  Signed :  Alecia Mina, PhD BENJAMIN (Psychologist)             Health Psychology                  Clinic    Department of Medicine  Alecia Mina, Ph.D., L.P. [phone]                          [facility] Casimiro Renae, Ph.D.,  L.P. [phone]                 [facility]  "Floor  [place] Mail Code 741   Darwin Linton, Ph.D., RIAZ.EMA.WADE., L.P. [phone]     [address] Fiorella Han, Ph.D., L.P. [phone]            [place] [address]           Christine Kebdee, Ph.D., L.P. [phone]     Inpatient Health Psychology Consultation*[DB1.1]    DATE OF SERVICE:  07/06/2017    REFERRAL SOURCE:  Eli Vick MD, [facility]ME1.1[facility][DB1.1], [address].    REASON FOR REFERRAL:  Hafsa Corona is a 62-year-old woman currently hospitalized and awaiting surgery to remove a pelvic mass discovered on 07/01/2017.  Ms. Corona has a significant history of anxiety and depression.  She requested this contact to gain additional emotional support in preparation for her surgery tomorrow.  This evaluation included information gathering directly from Ms. Corona as well as from documentation in Care Everywhere a[ME1.1]nd[DB1.1] chart review.      HISTORY OF PRESENT ILLNESS:  Ms. Corona describes that she has a history of anxiety and depression that goes back to her teen years.  She has been treated over the years both with medication and psychotherapy.  Most recently she has not been seeing a psychotherapist because of a variety of reasons including insurance coverage.  She has continued to see a psychiatric provider[ME1.1] for medication management[DB1.1] with whom she has a long relationship.  Most recently she has been using escitalopram 20 mg per day to manage symptoms of depression and anxiety and Vyvanse 50 mg per day to address a diagnosis of attention deficit disorder without hyperactivity.  Ms. Corona also reports a history of frequent panic episodes between 1999 and 2004.  More recently panic symptoms have been much less frequent.     Ms. Corona does report that very early Wednesday morning, approximately 3 a.m., that she had a \"melt down\" that sounds as if it was a panic " episode.  She asked for emotional support and was able to have contact with the  on call; she found this contact remarkably helpful and has felt much calmer, more grounded, and not at all frightened since that conversation.     Ms. Corona also has a history of depressive symptoms.  I note that this is reflected in Care Everywhere by her treating psychiatrist as a primary diagnosis of dysthymic disorder.  However, Ms. Corona last saw that provider several months ago.  Ms. Corona tells me that over the past month[ME1.1]s[DB1.1] her feelings of depression have been more intense and overwhelming.  She has been experiencing passive suicidal ideation at a much higher and more frequent level than she is used to.  She does acknowledge a very high level of stressors over the past year including a difficult work place environment, financial difficulties, as well as other stressors within her personal life.  She is very clear that she has never formulated a plan for self harm.  Currently, she is expressing feelings of some guilt over the suicidal ideation[ME1.1] while now focused on[DB1.1] how elieser her life feels to her in her current situation.  I also note her ability to use her intellect and humor in this situation, as she notes the high level of irony involved in her awareness of recent suicidal ideation and a current, clear determination to get excellent medical treatment and be as healthy as possible.        Ms. Corona is sleeping well during this hospitalization so far.  She states that once her physical pain was well managed that her appetite returned and she is trying to eat a healthy diet.  She describes a very strong support network of friends and family that is very present for her since her hospitalization.  She has 2 of her closest friends planning to spend time with her this evening.  She also plans to speak with some of her siblings and discuss what she would have put in her advance care directive had  she made one.  She did ask about this and we agreed that it was probably too late at this point to organize a notary to be present when the friend that she would like to appoint as her healthcare agent could be present prior to surgery.  She also plans to speak with her siblings about these issues so that they will know what her wishes will have been should they be asked to weigh in.  We did agree that I would provide Ms. Corona with a POLST form for her to complete and that her physician will sign and have available should it be necessary.      Ms. Corona was also focused on a goal of getting possible options for referral to a psychologist or other psychotherapist in the community.  She notes that she has not been seeing a therapist regularly for some time and has found this very helpful in the past.    Ms. Corona was very appreciative of this contact with Health Psychology and asked if I would be available tomorrow after surgery.  She is aware that she may not be able to be very interactive following surgery.  At the same time, she is also thinking about the possibility that she may be told shortly after she leaves the recovery room if the mass was benign or malignant and would like to have support available should she be told that she will need to go into planning for cancer treatment.      SOCIAL HISTORY:  Ms. Corona is the second child and oldest daughter of 5, with 1 older brother, 2 younger sisters, and a younger brother.  Both of her parents are .  She was born in [place] but grew up in [place], [place], a town of 80,000 people about [place] hour and a half from [place].  She attended the [employer][ME1.1],[DB1.1] [place] with a focus in graphic arts.  She moved to the [facility] following graduation to take a job as an .  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  She currently  works for [employer] [place].  She was quite disappointed to miss an interview for a long sought after job with the [employer] that was scheduled for yesterday.  Ms. Corona has many close friends that she considers to be important in her [place] of support.  She is close with her siblings.  She tells me that her younger brother has been living with refractory depression for many years and that she is pleased that he has finally found a treatment that appears to be helpful.  Ms. Corona has never been , but has had several important romantic relationships.  She is currently involved with a man who is very supportive but unfortunately does not live in the [place] area and is not able to be physically present during this hospitalization, although he is closely in touch by phone.  Ms. Corona grew up in a Pentecostal family but has found benefit from many other spiritual perspectives[ME1.1] as well[DB1.1].      MEDICAL HISTORY:  Ms. Corona's medical record indicates a history of type 2 diabetes as well as a period of time that she was experiencing tachycardia[ME1.1]; this appears to have been the same time period in which she was experiencing panic episodes[DB1.1].[ME1.1] She tells me about a cardiac anomaly that was discovered during this time.[DB1.1]  Please see her [facility] [place], electronic medical record for more complete information on her medical history, current admission and status, and all medications.      PSYCHIATRIC HISTORY:  As above in HPI.  Her brother is reported to have lived with refractory depression with possible psychotic symptoms over many years.  Her parents are also noted to both have had some mental health issues, as well as grandparents and one of her sisters.  There is documentation within Care Everywhere of medication management by psychiatry.  No other significant psychiatric history was available at the time of this  evaluation.    BEHAVIORAL IMPRESSIONS:  Ms. Corona presented for this evaluation in her room on [address].  She was alert and oriented.  She reported her mood as somewhat distressed and anxious but much calmer and not frightened than she had been several days ago.  She exhibited a varied and appropriate affect that was primarily euthymic.  Speech was clear, coherent, and goal oriented.  Use of language and use of vocabulary and syntax appears to reflect an above average intellect.  Insight and judgment appear to be good.  She exhibited no evidence of distortions in thought or perception.     IMPRESSIONS AND PLAN:  Hafsa Corona is a 62-year-old woman who was found to have a large pelvic mass last weekend after a 1-month history of bloating and distention.  She is awaiting surgery tomorrow.  She is aware that there is a possibility of malignancy.  She has a history of depression, anxiety, and attention deficit disorder that have been treated consistently with medication.  She has had many episodes of psychotherapy over her lifetime that she has found helpful.  She asked for this contact to gain additional emotional support prior to her surgery.  She appears to be doing an excellent job of accessing resources that would be helpful and using them effectively.  She has found contact with spiritual health very useful.  Today we focused on a compassion practice that I encouraged her to use throughout the evening and morning tomorrow as well as whenever she finds herself waiting during the presurgical and postsurgical process.  She appeared to find this tool very useful and made notes to herself indicating that she intended to use it.  She tells me that she finds psychotherapy that includes both focus on psychological issues and spiritual resources very helpful.  We discussed what she is looking for in a psychotherapist to establish care with, and we will provide her with several options for potential providers that she can  pursue following discharge from the hospital.  She also asked for support to be available following surgery tomorrow, knowing that she may get information about whether she needs to plan for cancer treatment.  We made a plan that I will track her progress in surgery and try to see her in the late afternoon if she is available.  If that is not possible, she tells me that her close friend, Thelma, will be arriving at 5:30 p.m. to provide support and that she feels comfortable with this plan.     DIAGNOSES:    1.  Dysthymic disorder (F34.1) (based on information from psychiatrist found in Care Everywhere).  2.  Major depressive disorder, single episode, moderate (F32.1).    3.  Generalized anxiety disorder (F41.1).  4.  Attention deficit disorder without mention of hyperactivity by history.  5.  History of panic disorder.       Alecia Mina, PhD, LP[ME1.1]         *In accordance with the Rules of the [place] Board of [employer], it is noted that psychological descriptions and scientific procedures underlying psychological evaluations have limitations.  Absolute predictions cannot be made based on information in this report.[DB1.1]    D:  07/06/2017 20:12 T:  07/08/2017 08:03  Document:  [id number] DB\DK[ME1.1]     Revision History        User Key Date/Time User Provider Type Action    > DB1.1 7/10/2017  9:11 AM Alecia Mina, PhD LP Psychologist Sign     ME1.1 7/8/2017  7:37 AM Kelli Amaral. Non-Clinical Sign            Consults by Alecia Mina, PhD LP at 7/6/2017  4:32 PM     Author:  Alecia Mina, PhD LP Service:  Health Psychology Author Type:  Psychologist    Filed:  7/7/2017  9:26 AM Date of Service:  7/6/2017  4:32 PM Creation Time:  7/6/2017  4:30 PM    Status:  Addendum :  Alecia Mina, PhD LP (Psychologist)     Consult Orders:    1. Psychology Adult IP Consult [[id number]] ordered by Eli Vick MD at 07/06/17 1342                  Health Psychology                   "[facility]    Department of Medicine  Alecia Mina, Ph.D., L.P. [phone]                          Clinics and Surgery Center  [facility] Casimiro Renae, Ph.D.,  L.P. [phone]                 3rd Floor  [place] Mail Code [address]41   Darwin Linton, Ph.D., KAUR., L.P. [phone]     [address] [place] [address] Fiorella Han, Ph.D., L.P. [phone]            [id number]  [place] [address]           Christine Kebede, Ph.D., L.P. [phone]     Inpatient Health Psychology Consultation*    Consult request received, chart reviewed.  Ms Corona reports a longstanding history of depression and anxiety. Panic episodes prominent between 199[DB1.1]9[DB1.2]-2004, not frequent since then.[DB1.1] Reports a \"meltdown\" very early Wednesday morning (0300) that sounds as if it was a panic episode, feeling overwhelmed, found contact with  at that time very helpful.[DB1.3]  Has been treated by psychiatrist in community. Does not currently have a psychotherapist and asks about options for ongoing psychological support.  Using resources for emotional and spiritual support well. Very pleased with her care here. Reports that she is currently not feeling scared,[DB1.1] plans to spend time this evening with two of her closest friends and talk to some of her siblings.  Taught her a compassion practice that she found very helpful and that I encouraged her to use as she proceeds through the process of events tonight and tomorrow.  Asks re [employer] Directive. I explained that it might be logistically difficult to get that form complete and notarized prior to surgery. Provided her with POLST form that she will complete with help of nursing. Dr Vick from Angela's team informed re this and will come to floor tomorrow morning to sign the form, asks that it be placed in Ms Corona's paper chart.  I do note that psychiatry notes in Care Everywhere indicate a primary " diagnosis of Dysthymic Disorder. However, based on Ms Corona's reports of symptoms over the past months, including frequent suicidal ideation, it seems probable that she currently also meets criteria for an episode of Major Depressive Disorder.   Affect varied, appropriate, congruent with content of conversation.    Diagnostic impression (based on information from patient and from notes in Care Everywhere): Dysthymic Disorder (34.1), Major depressive disorder, single episode, moderate (F32.1), Generalized Anxiety Disorder (F41.1), Attention Deficit Disorder without mention of hyperactivity (F90.0) . History of panic disorder.    Full dictation to follow.[DB1.3]        *In accordance with the Rules of the [place] Board of Psychology, it is noted that psychological descriptions and scientific procedures underlying psychological evaluations have limitations.  Absolute predictions cannot be made based on information in this report.[DB1.1]     Revision History        User Key Date/Time User Provider Type Action    > DB1.2 7/7/2017  9:26 AM Alecia Mina, PhD LP Psychologist Addend     DB1.3 7/6/2017  5:43 PM Alecia Mina, PhD LP Psychologist Sign     DB1.1 7/6/2017  4:30 PM Alecia Mina, PhD LP Psychologist             Consults by Diana Ortega APRN CNP at 7/5/2017 12:09 PM     Author:  Diana Ortega APRN CNP Service:  Interventional Radiology Author Type:  Nurse Practitioner    Filed:  7/5/2017 12:09 PM Date of Service:  7/5/2017 12:09 PM Creation Time:  7/5/2017 11:52 AM    Status:  Signed :  Diana Ortega APRN CNP (Nurse Practitioner)     Consult Orders:    1. Interventional Radiology IP Consult: IVC filter placement; Consultant may enter orders: Yes; Patient to be seen: Routine - within 24 hours [[id number]] ordered by Eli Vick MD at 07/04/17 1120                IR consulted for IVC filter placement prior to surgical resection of large pelvic mass.  Image concerning for gynecologic malignancy. Patient is on the surgery schedule for Friday, 7/7. Patient does have recent diagnosis of LLE DVT and is currently being anticoagulated with xarelto. US from 6/1 showed clot in proximal left peroneal and posterior tibial veins.     CT scan from 7/1/2017 reviewed with Dr. Bro from IR. Due to large mass compressing infrarenal IVC, unable to safely place filter prior to surgery. Also, IVC is too large to place a filter suprarenal.     IR can place filter post-surgically if team feels they will be unable to safely anticoagulate for a long period of time, but we would require a CT scan prior to proceeding (after the mass is surgically resected). These recommendations were relayed Dr. Vick from [facility]. She was going to share this information with gyn onc.    Diana Ortega DNP, APRN  Interventional Radiology   Phone: [phone]  Pager: [phone][KM1.1]       Revision History        User Key Date/Time User Provider Type Action    > KM1.1 7/5/2017 12:09 PM Diana Ortega APRN CNP Nurse Practitioner Sign                     Progress Notes - Physician (Notes from 07/09/17 through 07/12/17)      Progress Notes by Allison Thorne at 7/12/2017 11:30 AM     Author:  Allison Thorne Service:  Social Work Author Type:      Filed:  7/12/2017 11:34 AM Date of Service:  7/12/2017 11:30 AM Creation Time:  7/12/2017 11:30 AM    Status:  Addendum :  Allison Thorne ()         Social Work Services Discharge Note      Patient Name:  Hafsa Corona     Anticipated Discharge Date:  7/12/2017    Discharge Disposition:   TCU:  [facility]   [address] [place] [address]  Phone: [phone]  Fax: [phone]  Admissions: [phone]     Pre-Admission Screening (PAS) online form has been completed.  The Level of Care (LOC) is:  Determined  Confirmation Code is:[JM1.1]  [id number][JM1.2]  Patient/caregiver informed of  referral to [facility] for Pre-Admission Screening for skilled nursing facility (SNF) placement and to expect a phone call post discharge from SNF.     Additional Services/Equipment Arranged:  Pt's family/friend will provide transport around 1pm today.  Travel oxygen to be delivered via [facility] to hospital prior to transport.      Patient / Family response to discharge plan:  In agreement with DC today to TCU     Persons notified of above discharge plan:  Gyn/Onc team, 7C RN staff, [facility] ([facility]) and pt     Staff Discharge Instructions:  SW faxed discharge orders and signed hard scripts for any controlled substances.  Please print a packet (including scripts) and send with patient.     CTS Handoff completed:  Will complete upon DC    Medicare Notice of Rights provided to the patient/family:      ENID Callaway, MSW  7D  (Hem/Onc) -covering 7C   Pager: [phone]  7/12/2017[JM1.1]                 Revision History        User Key Date/Time User Provider Type Action    > [N/A] 7/12/2017 11:34 AM Allison Thorne  Addend     JM1.2 7/12/2017 11:34 AM Allison Thorne  Sign     JM1.1 7/12/2017 11:30 AM Allison Thorne              Progress Notes by Laura Pack MD at 7/12/2017  8:55 AM     Author:  Laura Pack MD Service:  Gyn/Onc Author Type:  Physician    Filed:  7/12/2017  9:03 AM Date of Service:  7/12/2017  8:55 AM Creation Time:  7/12/2017  1:47 AM    Status:  Signed :  Laura Pack MD (Physician)         GYN ONC PROGRESS NOTE  07/12/17    HD#:11 /POD#:5 XL, HELENE, LSO, omentectomy, evacuation of abdominal fluid, PPALND  Disease: Stage 1C borderline ovarian tumor    24 hour events:   - WOC recs: continue daily dressing changes, clean with Microklenz[MS1.1]  - Lymphedema therapist: placed compression wraps for comfort, compression stockings indicated after swelling decreases[DF1.1]  - SW: discharge to [facility]  - PT: patient declined  - Bowel movement x3    Subjective: Patient is feeling[MS1.1] better today[DF1.1].  Pain is[MS1.1] controlled with PO dilaudid, reporting some breakthrough pain at the inferior aspect of her incision[DF1.1].  Eating[MS1.1] a regular diet, but backing off a little due to emesis yesterday[DF1.1].[MS1.1] Drinking fluids.  Passing[DF1.1] Flatus,[MS1.1] 3[DF1.1] BM[MS1.1]s yesterday[DF1.1].  Voiding spontaneously.     Objective:   Vitals:    07/11/17 1014 07/11/17 1419 07/11/17 1514 07/11/17 2312   BP:  119/70 134/79 115/67   BP Location:  Right arm Right arm Right arm   Pulse: 96      Resp:  18 18 18   Temp:  98.7  F (37.1  C) 98.6  F (37  C) 99.9  F (37.7  C)   TempSrc:  Oral Oral Oral   SpO2: 97% 92% 94% 96%   Weight:       Height:             Gen- alert, no distress, cooperative  CV- Regular rate and rhythm,[MS1.1] 3/6 systolic murmur at R/LUSB[DF1.1]  Pulm- Normal - Clear to auscultation without rales, rhonchi, or wheezing. and bilateral air exchange present  Abd- soft, appropriately tender, non distended  Incision- dry and intact  Extr- 1+ LE edema,[MS1.1] bilateral compression wrappings[DF1.1], bite on left leg is covered by a bandage which is dry     I/Os  (Yesterday // Since Midnight)  PO[MS1.1] 880[DF1.1]cc //[MS1.1] 120[DF1.1]cc  IVF[MS1.1] 0[DF1.1]cc //[MS1.1] 0[DF1.1]cc  Urine[MS1.1] 1650[DF1.1]cc //[MS1.1] 250[DF1.1]cc    New Labs/Imaging-   Results for orders placed or performed during the hospital encounter of 07/02/17 (from the past 24 hour(s))   Glucose by meter   Result Value Ref Range    Glucose 123 (H) 70 - 99 mg/dL   CBC with platelets   Result Value Ref Range    WBC 6.8 4.0 - 11.0 10e9/L    RBC Count 2.77 (L) 3.8 - 5.2 10e12/L    Hemoglobin 7.4 (L) 11.7 - 15.7 g/dL    Hematocrit 24.2 (L) 35.0 - 47.0 %    MCV 87 78 - 100 fl    MCH 26.7 26.5 - 33.0 pg    MCHC 30.6 (L) 31.5 - 36.5 g/dL    RDW 14.6 10.0 - 15.0 %    Platelet Count 370 150 - 450 10e9/L   Basic metabolic  panel   Result Value Ref Range    Sodium 140 133 - 144 mmol/L    Potassium 3.7 3.4 - 5.3 mmol/L    Chloride 103 94 - 109 mmol/L    Carbon Dioxide 31 20 - 32 mmol/L    Anion Gap 6 3 - 14 mmol/L    Glucose 128 (H) 70 - 99 mg/dL    Urea Nitrogen 14 7 - 30 mg/dL    Creatinine 1.00 0.52 - 1.04 mg/dL    GFR Estimate 56 (L) >60 mL/min/1.7m2    GFR Estimate If Black 68 >60 mL/min/1.7m2    Calcium 8.4 (L) 8.5 - 10.1 mg/dL   UA with Microscopic reflex to Culture   Result Value Ref Range    Color Urine Yellow     Appearance Urine Clear     Glucose Urine Negative NEG mg/dL    Bilirubin Urine Negative NEG    Ketones Urine Negative NEG mg/dL    Specific Gravity Urine 1.008 1.003 - 1.035    Blood Urine Negative NEG    pH Urine 5.0 5.0 - 7.0 pH    Protein Albumin Urine Negative NEG mg/dL    Urobilinogen mg/dL Normal 0.0 - 2.0 mg/dL    Nitrite Urine Negative NEG    Leukocyte Esterase Urine Negative NEG    Source Midstream Urine     WBC Urine 1 0 - 2 /HPF    RBC Urine 1 0 - 2 /HPF    Bacteria Urine Few (A) NEG /HPF    Squamous Epithelial /HPF Urine <1 0 - 1 /HPF    Mucous Urine Present (A) NEG /LPF   Glucose by meter   Result Value Ref Range    Glucose 144 (H) 70 - 99 mg/dL   Glucose by meter   Result Value Ref Range    Glucose 142 (H) 70 - 99 mg/dL   Glucose by meter   Result Value Ref Range    Glucose 120 (H) 70 - 99 mg/dL   Glucose by meter   Result Value Ref Range    Glucose 121 (H) 70 - 99 mg/dL[MS1.1]       Pending cultures (date obtained): None     Assessment: Ms. Corona is a 62 year old female with a borderline ovarian tumor that is POD#5 from a XL, HELENE, LSO, omentectomy, evacuation of abdominal fluid, PPALND, postoperative course complicated by poor pain control, improving.      Active Problem list:    - postoperative state  - borderline ovarian tumor  - DVT  - anemia  - HTN  - Cellulitis  - T2DM      FEN:   - Regular diet      Pain:   - Scheduled tylenol and gabapentin. PRN PO dilaudid.       Heme:   - Recent diagnosis on  6/5 of unprovoked LLE DVT, on Xarelto per hematology.    - Chronic anemia, Hgb 9.6 > 8.6>7.6, hemodilution vs slow intraabdominal bleeding. Will continue to monitor, transfuse if Hgb <7.       CV:   - HTN, HLD. home Lisinopril held as blood pressures have been low-normal.  Continue to monitor.[MS1.2] Lower extremity edema of unknown etiology, most likely due to post surgical fluid accumulation. Lymphedema wraps placed.[DF1.1]       Pulm:   - No prior hx of lung dz, however pt with SOB and wheezing on exam. CXR WNL on admission. Requiring 1-2L oxygen to maintain gurpreet >90% since surgery. Consider atelectasis vs mild pulmonary edema 2/2 fluid administration vs PE. Encourage IS. No indication for CTA as pt is already on anticoagulation. Consider CXR if increasing O2 requirements or pt develops fever.[MS1.2] Plan to d/c with O2 to TCU[DF1.1]      GI:   - NI      :  - No issues      ID:   - Cellulitis: Pt has L thigh cellulitis 2/2 dog bite, febrile on admission. s/p IV Vanco/Zosyn and 7 days augmentin. >24hr afebrile. WOC nurse recommends daily dressing changes and cleaning with microklenz.       Endocrine:   - T2DM. Medium SSI. Metformin held.  Blood glucose mildly elevated, continue to monitor closely.[MS1.2] Plan to restart metformin on d/c.[DF1.1]      Psych/Neuro:   - anxiety/depression, on home Lexapro      PPX:   - SCD R leg, Xarelto as above.       Disposition: Possible d/c to TCU today.[MS1.2]             The patient was seen and examined with the resident, the labs and vital signs were reviewed.The medical care outlined above was provided under my directives and direct supervision.    Laura Pack MD, MPH  Gynecology Oncology[RG1.1]       Revision History        User Key Date/Time User Provider Type Action    > RG1.1 7/12/2017  9:03 AM Laura Pack MD Physician Sign     DF1.1 7/12/2017  6:05 AM Anusha Kearns Medical Student Share     MS1.2 7/12/2017  2:02 AM Penny Quan MD Resident  Share     MS1.1 7/12/2017  1:47 AM Penny Quan MD Resident             Progress Notes by Shweta Sanders MSW at 7/11/2017  1:28 PM     Author:  Shweta Sanders MSW Service:  Social Work Author Type:      Filed:  7/11/2017  4:02 PM Date of Service:  7/11/2017  1:28 PM Creation Time:  7/11/2017  1:28 PM    Status:  Addendum :  Shweta Sanders MSW ()         Social Work Services Progress Note    Hospital Day: 10  Date of Initial Social Work Evaluation:  07/05/17  Collaborated with:  Pt, [facility][AD1.1] (Iris: [phone]; jayjay: [phone]), pt's brother (Phil: [phone]), [facility][AD1.2]    Data:  Pt will d/c tomorrow.     Intervention:      ZACARIAS met with pt and Kimberley from [facility] in pt's room. Kimberley explained TCU services to pt and stated that they will have a private room for pt. Pt asked to see medical team, as she has concerns about d/c'ing today.  Kimberley stated that they will have a bed for pt tomorrow, as well. Pt asked SW to update her brother.    ZACARIAS spoke with [facility] intern who stated that pt will now d/c tomorrow.[AD1.1]    ZACARIAS spoke with Iris with [facility] who stated that they can take pt tomorrow at 1:00. Iris said that they use [facility] for O2.[AD1.2]     ZACARIAS updated pt's brother re: d/c plan. He stated that he may be in town tomorrow.[AD1.1]     ZACARIAS spoke with pt who stated that her brother can transport tomorrow at 2:00.     ZACARIAS set up O2 travel tank with [facility]  ([phone]) who stated that they will need the Facesheet and orders faxed when available. Provided with LGZACARIAS's [place]) contact.     ZACARIAS left  with pt's brother re: d/c time, transport and O2 transport tank. Provided with LGSW's (Allison) contact.     SW text paged [facility] team re: d/c plan and request for orders in the morning to fax to [facility][AD1.2]      Assessment:[AD1.1]  Pt will  d/c tomorrow at 1:30 with 2L of O2 ([facility]. Pt's brother, Phil, will transport. Pt in agreement w/ d/c plan.[AD1.3]    Plan:    Anticipated Disposition:  Facility:  [facility]    Barriers to d/c plan:  Medical stability    Follow Up:  SW will continue to follow to assist with d/c plan.    MARY Marie, LGSW  7C Surgical Oncology Unit  Phone: [phone]  Pager: [phone][AD1.1]           Revision History        User Key Date/Time User Provider Type Action    > AD1.3 7/11/2017  4:02 PM Shweta Sanders MSW  Addend     AD1.2 7/11/2017  3:45 PM Shweta Sanders MSW  Addend     [N/A] 7/11/2017  2:19 PM Shweta Sanders MSW  Addend     [N/A] 7/11/2017  1:48 PM Shweta Sanders MSW  Addend     AD1.1 7/11/2017  1:42 PM Shweta Sanders, MSW  Sign            Progress Notes by Suki Johnson OT at 7/11/2017  3:01 PM     Author:  Suki Johnson OT Service:  (none) Author Type:  Occupational Therapist    Filed:  7/11/2017  3:01 PM Date of Service:  7/11/2017  3:01 PM Creation Time:  7/11/2017  3:01 PM    Status:  Signed :  Suki Johnson OT (Occupational Therapist)            07/11/17 1115   Rehab Discipline   Discipline OT   Type of Visit   Type of visit Initial Edema Evaluation   General Information   Start of care 07/11/17   Referring physician Concepcion Kohler APRN CNP   Orders Evaluate and treat as indicated   Order date 07/10/17   Medical diagnosis ovarian tumor   Onset of illness / date of surgery 07/02/17   Edema onset (since May in L LE, ~1 week in R LE)   Affected body parts LLE;RLE;Trunk   Edema etiology comments surgery/omentectomy, pelvic mass, hypoalbuminemia, decreased muscle pump, fluid overload   Pertinent history of current problem (PT: include personal factors and/or comorbidities that impact the POC; OT: include additional occupational profile info) Ms. Corona is a 62  "year old female with a borderline ovarian tumor that is POD#4 from a XL, HELENE, LSO, omentectomy, evacuation of abdominal fluid, PPALND, postoperative course complicated by poor pain control, improving.    Surgical / medical history reviewed Yes   Prior level of functional mobility I without AD   Prior treatment (none)   Patient role / employment history Employed   Living environment House / [facility]   Living environment comments Pt fatigued and with significant abdominal pain. Limited PLOF/background information obtained per pt request to limit talking. Per PT eval, \"Pt lives alone in single story duplex with basement (laundry located in basement, full flight down with handrail), 5 [address]  home. Pt lives with two \"big\" dogs and a cat. Pt notes that home is messy and that dogs remain home alone for long hours dt her work schedule so she is required to  \"messes\". Pt reporting minimal assist available with brothers living in [place] and friends assisting their families. \"   Subjective Report   Patient report of symptoms Legs feel \"tight\" and \"heavy,\" making moving and dressing difficult   Precautions   Precautions (abdominal precautions)   Precautions comments may have had cellulitis in L LE, DVT (+) in May   Patient / Family Goals   Patient / family goals statement To lose weight in her legs   Pain   Patient currently in pain Yes, see vital signs flowsheet   Pain comments nauseous and with abdominal pain   Cognitive Status   Orientation Orientation to person, place and time   Level of consciousness Lethargic / Somnolent   Edema Exam / Assessment   Skin condition Dryness;Pitting   Skin condition comments L LE > R LE, pt reports she was diagnosed with blood clot in May and edema has existed ever since. Pt with taut 1+-2+ pitting in B LEs to the thigh, making ambulation and movement difficult. Abdomen distended. Pt with bruising/varicosities present on bilateral legs. Skin shiny, taut, with skin creasing noted at MTPs. Pt " with area of discoloration on distal L LE. Mepilex dressing applied to lateral L LE, R LE edematous ~1 week since surgery per pt. Dryness noted.    Pitting 1+;2+   Capillary refill Symmetrical   Dorsal pedal pulse Symmetrical   Girth Measurements   Girth Measurements Refer to separate girth measurement flowsheet   Range of Motion   ROM comments unable to assess, pt declined   Strength   Strength comments unable to assess, pt declined   Activities of Daily Living   Activities of Daily Living Pt lives alone, works FT assisting people find jobs. Pt was I with ADLs, driving, cooking, cleaning, laundry, medication management, and finances PTA. Pt takes care of 2 dogs and 1 cat.   Sensory   Sensory perception comments denied n/t   Planned Edema Interventions   Planned edema interventions Gradient compression bandaging;Fit for compression garment;Exercises;Precautions to prevent infection / exacerbation;Education;ADL training;Skin care / precautions;Home management program development   Clinical Impression   Criteria for skilled therapeutic intervention met Yes   Therapy diagnosis Decreased I/ease with ADLs   Influenced by the following impairments / conditions Edema;Wounds / Ulcers  (mepilex dressing over dog bite on L LE)   Assessment of Occupational Performance 3-5 Performance Deficits   Identified Performance Deficits home management, functional mobility/transfers, showering, dressing   Clinical Decision Making (Complexity) Low complexity   Treatment frequency 5 times / week  (BID OT/edema)   Treatment duration 1 week   Patient / family and/or staff in agreement with plan of care Yes   Risks and benefits of therapy have been explained Yes   Clinical impression comments Pt to benefit from skilled OT to address B LE edema, promote skin integrity, comfort, and ease/I with ADLs and IADLs for return to [facility]. See daily flowsheet for details regarding today's treatment.    Goals   Edema Eval Goals (IP) See plan of care for  patient goals   Total Evaluation Time   Total evaluation time 8[BN1.1]        Revision History        User Key Date/Time User Provider Type Action    > BN1.1 2017  3:01 PM Suki Johnson OT Occupational Therapist Sign            Progress Notes by Candida Cavazos RN at 2017 10:53 AM     Author:  Candida Cavazos RN Service:  [facility] Nurse Author Type:  Registered Nurse    Filed:  2017 10:59 AM Date of Service:  2017 10:53 AM Creation Time:  2017 10:53 AM    Status:  Signed :  Candida Cavazos RN (Registered Nurse)         [facility] Nurse Inpatient Wound Assessment     Followup Assessment of wound(s) on pt's:   Left lateral leg    Data:     Patient History:      per MD notes): 62 year old female with history of DM-2, anxiety, HLD, chronic rhinitis, right oophorectomy (), DVT (diagnosed 17) on Xarelto who presented to the ED for evaluation of pelvic pain, per CT pelvic mass. Left upper lateral leg dog bite about 2 weeks ago was opened and drained in ED.     Moisture Management:  dressings    Current Diet / Nutrition:         Active Diet Order      Regular Diet Adult      Advance Diet as Tolerated           Quinton Assessment and sub scores:   Quinton Score  Av  Min: 19  Max: 19   Recent Labs   Lab Test  17   0426   17   0823   17   2255   17   1125   ALBUMIN   --    --    --    --   3.2*   < >   --    HGB  7.4*   < >   --    < >  8.6*   < >   --    INR   --    --    --    --   1.17*   < >   --    WBC  6.8   < >   --    < >  9.3   < >   --    A1C   --    --    --    --    --    --   6.0   CRP   --    --   49.0*   < >   --    --    --     < > = values in this interval not displayed.     Wound Assessment (location #1):   Left Upper Lateral Leg  Wound History:  Dog bite, with one deep tissue puncture.  With 2 superficial abrasion on either side of primary wound    Wound Base: not visible, 1cm incision drain incision,  "surrounding bruising has faded. Induration persists with normal skin temperature, suspect residual hematoma.  No active bleeding with wound cares. Supeficial scabs on either side of I&D incision    Tunnelin-3  cm    Palpation of the wound bed:  Firm induration more superior and lateral than medial    Slough appearance:  none    Periwound Skin: intact,  Bruising now faded    Drainage: scant serous    Odor: none    Pain:  absent          Intervention:     Patient's chart evaluated.      Wound(s) was assessed    Wound Care: was done:  Changed dressing, visual inspection    Orders  Reviewed    Supplies  Ordered: 1/4 Iodoform packing    Discussed plan of care with Patient          Assessment:        Left leg wound 2/2 dog bite 2 week ago, persistent induration, suspected residual hematoma         Plan:     Nursing to notify the Provider(s) and re-consult the [facility] Nurse if wound(s) deteriorate(s).    Plan of care for wound located on Left Lateral Thigh: Change dressing daily, clean with Microklenz, pack wound with 1/4\" Iodoform gauze, cover with Mepilex dressing    WO Nurse will return: 1 week     Face to face time: 20 minutes[LH1.1]        Revision History        User Key Date/Time User Provider Type Action    > LH1.1 2017 10:59 AM Candida Cavazos RN Registered Nurse Sign            Progress Notes by Fredi Steward at 2017  8:28 AM     Author:  Fredi Steward Service:  Spiritual Health Author Type:      Filed:  2017  8:35 AM Date of Service:  2017  8:28 AM Creation Time:  2017  8:28 AM    Status:  Signed :  Fredi Steward ()         SPIRITUAL HEALTH SERVICES  SPIRITUAL ASSESSMENT Progress Note  [facility] 7C   ON-CALL VISIT    REFERRAL SOURCE: Page from Nurse     Reviewed PT health history. PT expressed unease with abruptness of transition to TCU. PT expressed frustration with \"having the bladder and bowel control of a [age] year old.\" PT expressed support of " "local Rabbi, and a Rabbi from her place of employment [employer]. PT compared her struggle with depression to the song \"I fought the law and the law won\", saying \"I'm not sure I won, but I made it to the other side. PT expressed desire for pain to be gone \"I asked the nurse roderickie if I could just take a pill that would knock me out for a couple days and I could just wake up better.\" PT expressed her ecumenical and interfaith background. Shared a prayer for barrington and healing with the PT.    PLAN: Will inform unit  of visit.    Fredi Steward   Intern  Pager [phone][CB1.1]       Revision History        User Key Date/Time User Provider Type Action    > CB1.1 7/11/2017  8:35 AM Ferdi Steward Sign            Progress Notes by Penny Quan MD at 7/11/2017  6:00 AM     Author:  Penny Quan MD Service:  Gyn/Onc Author Type:  Resident    Filed:  7/11/2017  7:27 AM Date of Service:  7/11/2017  6:00 AM Creation Time:  7/10/2017  6:03 PM    Status:  Signed :  Penny Quan MD (Resident)         GYN ONC PROGRESS NOTE  07/11/17    HD#:10 /POD#:4 XL, HELENE, LSO, omentectomy, evacuation of abdominal fluid, PPALND  Disease: Stage 1C borderline ovarian tumor    24 hour events:   - Lymphedema consult, awaiting recs  - PT recommend TCU placement after discharge  - SW, working on TCU placement  - Walking O2sats: 93% at rest on room air, 97% at rest w/ 2L O2, 87% w/ activity on room air, 95% w/ activity w/ 2L O2[MS1.1]  - Urinary incontinence: UA[MS1.2] reassuring[MS1.3]      Subjective: Patient is[MS1.1] complaining of more pain this morning, 8/10[MS1.4] .[MS1.1] The pain is located in her lower abdomen and radiates to her left hip. She says the pain meds are not helping as much today as they were previously. She is also concerned today about urinary incontinence that she has been experiencing for the past 2 days, especially at night. She usually knows when she is " urinating, no dysuria, no hematuria, no urgency or increased frequency. She reports having a UTI with similar symptoms in the past. Eating a regular diet, drinking fluids by mouth. Passing flatus. Two small bowel movements overnight, still feels constipated despite increased bowel regimen. Voiding spontaneously. She is also concerned with the swelling in her legs, self reported 20 pound weight gain. Her skin feels tight in her lower extremities.[MS1.4]    Objective:   Vitals:    07/10/17 0722 07/10/17 1204 07/10/17 1345 07/10/17 1508   BP: 114/71 132/77  119/73   BP Location: Right arm Right arm  Right arm   Pulse: 84 98  88   Resp: 16 18  16   Temp: 96.5  F (35.8  C) 98.3  F (36.8  C)  98.1  F (36.7  C)   TempSrc: Oral Oral  Oral   SpO2: 94% 95%  95%   Weight:   102.4 kg (225 lb 12.8 oz)    Height:             Gen- alert, no distress, cooperative  CV- Regular rate and rhythm, no murmur present  Pulm- Normal - Clear to auscultation without rales, rhonchi, or wheezing. and bilateral air exchange present  Abd- soft, appropriately tender, non distended  Incision- dry and intact  Extr- 1+ LE edema, SCD in place on Rt leg, bite on left leg is covered by a bandage which is dry but has some shadowing    I/Os  (Yesterday // Since Midnight)  PO[MS1.1] 990[MS1.5]cc //[MS1.1] 0[MS1.5]cc  IVF[MS1.1] 1020[MS1.5]cc //[MS1.1] 0[MS1.5]cc  Urine[MS1.1] 1095[MS1.5]cc //[MS1.1] 625[MS1.5]cc    New Labs/Imaging-   Results for orders placed or performed during the hospital encounter of 07/02/17 (from the past 24 hour(s))   Glucose by meter   Result Value Ref Range    Glucose 118 (H) 70 - 99 mg/dL   Glucose by meter   Result Value Ref Range    Glucose 123 (H) 70 - 99 mg/dL   Glucose by meter   Result Value Ref Range    Glucose 103 (H) 70 - 99 mg/dL   Basic metabolic panel   Result Value Ref Range    Sodium 141 133 - 144 mmol/L    Potassium 4.0 3.4 - 5.3 mmol/L    Chloride 106 94 - 109 mmol/L    Carbon Dioxide 32 20 - 32 mmol/L    Anion  Gap 3 3 - 14 mmol/L    Glucose 108 (H) 70 - 99 mg/dL    Urea Nitrogen 18 7 - 30 mg/dL    Creatinine 1.17 (H) 0.52 - 1.04 mg/dL    GFR Estimate 47 (L) >60 mL/min/1.7m2    GFR Estimate If Black 56 (L) >60 mL/min/1.7m2    Calcium 8.6 8.5 - 10.1 mg/dL   CBC with platelets   Result Value Ref Range    WBC 8.7 4.0 - 11.0 10e9/L    RBC Count 2.80 (L) 3.8 - 5.2 10e12/L    Hemoglobin 7.6 (L) 11.7 - 15.7 g/dL    Hematocrit 24.8 (L) 35.0 - 47.0 %    MCV 89 78 - 100 fl    MCH 27.1 26.5 - 33.0 pg    MCHC 30.6 (L) 31.5 - 36.5 g/dL    RDW 15.0 10.0 - 15.0 %    Platelet Count 343 150 - 450 10e9/L       Pending cultures (date obtained): None    Assessment: Ms. Corona is a 62 year old female with a borderline ovarian tumor that is POD#4 from a XL, HELENE, LSO, omentectomy, evacuation of abdominal fluid, PPALND, postoperative course complicated by poor pain control, improving.     Active Problem list:    - postoperative state  - borderline ovarian tumor  - DVT  - anemia  - HTN  - Cellulitis  - T2DM      FEN:   - Regular diet      Pain:   - Scheduled tylenol and gabapentin. PRN PO dilaudid.       Heme:   - Recent diagnosis on 6/5 of unprovoked LLE DVT, on Xarelto per hematology.    - Chronic anemia, Hgb 9.6 > 8.6>7.6, hemodilution vs slow intraabdominal bleeding. Will continue to monitor, transfuse if Hgb <7.       CV:   - HTN, HLD. home Lisinopril held as blood pressures have been low-normal.  Continue to monitor.      Pulm:   - No prior hx of lung dz, however pt with SOB and wheezing on exam. CXR WNL on admission. Requiring 1-2L oxygen to maintain gurpreet >90% since surgery. Consider atelectasis vs mild pulmonary edema 2/2 fluid administration vs PE. Encourage IS. No indication for CTA as pt is already on anticoagulation. Consider CXR if increasing O2 requirements or pt develops fever.       GI:   - NI     :  - No issues      ID:   - Cellulitis: Pt has L thigh cellulitis 2/2 dog bite, febrile on admission. s/p IV Vanco/Zosyn and 7 days  augmentin. >24hr afebrile. [facility] nurse to evaluate.       Endocrine:   - T2DM. Medium SSI. Metformin held.  Blood glucose mildly elevated, continue to monitor closely.      Psych/Neuro:   - anxiety/depression, on home Lexapro      PPX:   - SCD R leg, Xarelto as above.       Disposition: Possible d/c to TCU today or tomorrow.[MS1.1]       Penny Quan MD PhD  Ob/Gyn PGY-2  7/11/2017 7:27 AM[MS1.3]    Pager [phone][MS1.1]     The patient was seen and examined with the resident, the labs and vital signs were reviewed.The medical care outlined above was provided under my directives and direct supervision.    Laura Pack MD, MPH  Gynecology Oncology[RG1.1]         Revision History        User Key Date/Time User Provider Type Action    > MS1.3 7/11/2017  7:27 AM Penny Quan MD Resident Sign     MS1.4 7/11/2017  7:24 AM Penny Quan MD Resident Share     RG1.1 7/11/2017  7:05 AM Laura Pack MD Physician Share     MS1.5 7/11/2017  6:09 AM Penny Quan MD Resident Share     [N/A] 7/11/2017  6:00 AM Penny Quan MD Resident Share     MS1.2 7/11/2017  3:59 AM Penny Quan MD Resident Share     [N/A] 7/10/2017  6:23 PM Penny Quan MD Resident Share     MS1.1 7/10/2017  6:12 PM Penny Quan MD Resident Share            Progress Notes by Krishan Grady MD at 7/9/2017  3:53 PM     Author:  Krishan Grady MD Service:  Anesthesiology Author Type:  Resident    Filed:  7/9/2017  4:49 PM Date of Service:  7/9/2017  3:53 PM Creation Time:  7/9/2017  3:53 PM    Status:  Attested :  Krishan Grady MD (Resident)    Cosigner:  Mohinder Nguyen MD at 7/11/2017  7:09 AM        Attestation signed by Mohinder Nguyen MD at 7/11/2017  7:09 AM        Physician Attestation   I agree with the information in this note.    Mohinder Nguyen MD                               REGIONAL ANESTHESIA PAIN SERVICE CONTINUOUS NERVE INFUSION  NOTE  SUBJECTIVE:  Interval History: Pt reports[VN1.1] minimal[VN1.2] pain control via continuous peripheral nerve block (CPNB) infusion[VN1.1] even with boluses[VN1.2].  Increased pain overnight requiring[VN1.1] last bolus at 0400[VN1.2], however pain[VN1.1] was[VN1.2] better controlled[VN1.1] today with continuation of oral pain medications.[VN1.2]  No changes in neurologic status on exam.  Denies any weakness, paresthesias, circumoral numbness, metallic taste or tinnitus.  Pt is ambulating with assistance.  Patient is currently with no nausea or vomiting.[VN1.1] Normally on home oxygen.[VN1.2]     Clinically Aligned Pain Assessment (CAPA):   Comfort (How is your pain?):[VN1.1] Uncomfortable, controlled with oral medications[VN1.2]  Change in Pain (Since your last medication/intervention?):[VN1.1] Stable[VN1.2]  Pain Control (How are your pain treatments working?):  Partially effective pain control  Functioning (Are you able to do activities to get better?) : Can do most things[VN1.1] with assistance[VN1.2], but pain gets in the way of some   Sleep (Does your pain management allow you to sleep or rest?): Awake with occasional pain[VN1.1], difficulty sleeping at times[VN1.2]     Numerical Rating Scale:[VN1.1]  5[VN1.2]/10 at rest and[VN1.1] 8[VN1.2]/10 with movement.        Anticoagulation:  Lovenox 40 mg[VN1.1] Q24h (LAST DOSE >24 hours ago)[VN1.2]      OBJECTIVE:    Diagnostic:[VN1.1]  Lab Results   Component Value Date    WBC 8.8 07/08/2017    HGB 8.3 (L) 07/08/2017    HCT 25.7 (L) 07/08/2017     07/08/2017     07/08/2017    POTASSIUM 4.0 07/08/2017    CHLORIDE 106 07/08/2017    CO2 30 07/08/2017    BUN 20 07/08/2017    CR 1.08 (H) 07/08/2017     (H) 07/08/2017    SED 25 06/05/2017    AST 27 07/02/2017    ALT 14 07/02/2017    ALKPHOS 92 07/02/2017    BILITOTAL 0.7 07/02/2017    INR 1.17 (H) 07/02/2017[VN1.3]       Vitals:[VN1.1]    Temp:  [36.5  C (97.7  F)-37.3  C (99.2  F)] 36.6  C (97.8   F)  Pulse:  [] 97  Heart Rate:  [101-107] 106  Resp:  [8-18] 16  BP: ()/(58-92) 125/71  SpO2:  [89 %-96 %] 96 %[VN1.4]    Exam:    Strength 5/5 and symmetric grossly in bilateral LE[VN1.1], ambulates with assitance, on continuous oxygen[VN1.2]   T9 Epidural catheter site intact with dressing c/d/i, no tenderness, erythema, heme, edema      ASSESSMENT/PLAN:    Hafsa Corona is a 62 year old female POD #2 s/p COMBINED LAPAROTOMY, TUMOR DEBULKING  SALPINGO-OOPHORECTOMY BILATERAL  COMBINED HYSTERECTOMY TOTAL ABDOMINAL, SALPINGO-OOPHORECTOMY, NODE DISSECTION and placement of T9 epidural catheter with  for analgesia.[VN1.1]  Patient takes xarelto and last took a dose 7/5.[VN1.2] Pt is receiving[VN1.1] minimal[VN1.2] analgesia with bupivacaine 0.125%, total infusion 1[VN1.1]4[VN1.2] mL/hour.  Pt is[VN1.1] at baseline on oxygen and ambulates with assistance[VN1.2].  No weakness or paresthesias, adequate sensory block.  No evidence of adverse side effects associated with local anesthetic.[VN1.1] Request from primary team made to remove epidural today.[VN1.2]    -[VN1.1] Epidural removed at 1630, tip intact, easily removed and no blood or discomfort noted  - Ok to start anticoagulation ~4 hours from removal  -Had extended discussion with patient on realistic expectations of pain management post op. She also appears sedated during interview, she was informed that due to current need for oxygen at baseline, opioids could lead to respiratory depression and may be held if she was overly sedated.[VN1.2]  - discussed plan with attending anesthesiologist    Krishan Grady MD  Anesthesia Resident - CA2  Pager: [phone]  7/9/2017  15:55 PM  [facility] Anest[facility] Pain Service      24 hour Job Code Pager.  For in-house use only.     [place]:  * * [phone]  [place]: * * [phone]  Peds: * * [phone]  Enter call-back number and #      This pager only accepts text messages through [facility][VN1.1]       Revision History   "      User Key Date/Time User Provider Type Action    > VN1.3 7/9/2017  4:49 PM Krishan Grady MD Resident Sign     VN1.2 7/9/2017  4:40 PM Krishan Grady MD Resident      VN1.4 7/9/2017  3:54 PM Krishan Grady MD Resident      VN1.1 7/9/2017  3:53 PM Krishan Grady MD Resident             Progress Notes by Sadie Sears RN at 7/11/2017  6:50 AM     Author:  Sadie Sears RN Service:  (none) Author Type:  Registered Nurse    Filed:  7/11/2017  7:03 AM Date of Service:  7/11/2017  6:50 AM Creation Time:  7/11/2017  7:00 AM    Status:  Signed :  Sadie Sears RN (Registered Nurse)         While rounding on Patient, Patient found to be upset/teary and verbalizing frustration and \"wanting to increase psych meds to help her sleep a long time.\" Pt confirmed she has no intent to harm herself. This nurse provided emotional support. Will pass on to Day Nurse and continue to assess.[BS1.1]      Revision History        User Key Date/Time User Provider Type Action    > BS1.1 7/11/2017  7:03 AM Sadie Sears RN Registered Nurse Sign            Progress Notes by Eli Vick MD at 7/7/2017 11:46 AM     Author:  Eli Vick MD Service:  Family Medicine Author Type:  Resident    Filed:  7/7/2017 12:31 PM Date of Service:  7/7/2017 11:46 AM Creation Time:  7/7/2017 11:46 AM    Status:  Attested :  Eli Vick MD (Resident)    Cosigner:  Darwin Antonio MD at 7/11/2017  5:08 AM        Attestation signed by Darwin Antonio MD at 7/11/2017  5:08 AM        Attestation:  This patient has been seen and evaluated by Darwin ambrosio on 7/7/17.  I saw and discussed the case with the primary resident and the care team. I agree with the findings and plan in this note. I expended more than 45 minutes reviewing today's vital signs, medications, laboratory results.  Darwin Antonio MD  [facility]                                       [facility] - Inpatient " daily progress note    Date of admission: 7/2/2017  Date of service: 7/7/2017         Assessment and Plan:   Assessment:   Hafsa is a 62 year old female with h/o right salpingo-oopherectomy (2007), DVT (diagnosed 6/5/2017) on xarelto, DM2, HLD, admitted for evaluation of pelvic mass on CT concerning for gyn malignancy, on OR today with gyn/onc for both diagnosis and treatment. Also managing LUE abscess s/p I&D 7/2  Patient Active Problem List   Diagnosis     Attention deficit disorder     PANIC DISORDER      VASOMOTOR RHINITIS     Chronic Maxillary Sinusitis     Tachycardia     Type 2 diabetes mellitus without complication (H)     HYPERLIPIDEMIA LDL GOAL <100     Allergic rhinitis due to other allergen     BMI > 35     Essential hypertension     Lumbago     Major depressive disorder, recurrent episode, mild (H)     Long-term (current) use of anticoagulants [Z79.01]     Deep vein thrombosis (DVT) (H) [I82.409]     Pelvic mass in female      Plan:   ##Pelvic Mass on CT, concerning for malignancy  ##Pelvic Pain, abdominal distension    elevated at 1187. Gyn/onc consulted,[ST1.1] OR today for both diagnosis and treatment.[ST1.2]     -[ST1.1]OR today for:[ST1.2] exploratory laparotomy, bilateral SPO, possible cancer staging including hysterectomy, pelvic/para-aortic lymphadenopathy, omentectomy, peritoneal biopsies  -[ST1.1]-[ST1.2]anticoagulation plan post op per gyn/onc. Hb check 24hrs post-operatively before starting any pharmacologic anticoagulation.[ST1.1] Anticipate starting lovenox post-op.[ST1.2]  -NPO[ST1.1] for OR[ST1.2]  -post op check this evening[ST1.3]  -pain control with dilaudid 2-4mg PO q4h prn, tylenol 1000mg q8h prn[ST1.1]. May require IV pain medication post op if unable to tolerate PO due to nausea from gen anesthesia.  -z[ST1.3]ofran for nausea    ##Left upper extremity abscess s/p I&D-improving  Due to dog bite incident on 6/27. S/P I&D 7/2 and then started on broad-spectrum abx. Patient up  to date on tetanus. CRP down trending, and remains afebrile.   -continue PO augmentin ([ST1.1]7/4--). Today is day 5 of total Abx treatment (IV Vanc and Zosyn 7/3). Will plan to complete 7 days of Abx (7/3-7/10)[ST1.3]  -daily dressing changes  -Leg elevation   -Pain relief with dilaudid 2-4mg PO q4h prn, tylenol 1000 mg Q8H PRN    ##Normocytic anemia  Baseline hemoglobin ~10. Her hemoglobin has been down trending over the last month. DDx include malignancy (chronic disease) vs nutritional deficiency. No active signs or symptoms of bleeding. Anemia work up revealing for low iron and low iron saturation. S/P 2 units PRBC 7/5 in setting of low Hb and optimization prior to OR, with improvement in Hb   -re-check Hb 24hrs post operatively[ST1.1]   -consider IV iron post operatively[ST1.2]    ##Constipation[ST1.1]-improving  Bowel movement last night and this morning[ST1.2]  -[ST1.1]continue[ST1.3] bowel regimen: Miralax BID and Senokot BID     ##tachypnea-resolved  ##hypoxia-resolved  Alternates between room air and supplemental O2 with stable O2 sats.Hafsa reports difficulty with deep inspiration, likely due to compression on diaphragm by mass preventing full lung expansion. No pleuritic chest pain and not in any apparent respiratory distress.[ST1.1]   -con[ST1.3]tinue incentive spirometry   -continuous pulse ox  -supplemental O2 as needed    ##Left lower extremity DVT  Diagnosed in early June 2017. Patient initially on warfarin but then started on xarelto. May be secondary to venous compression by pelvic mass causing venous stasis vs malignancy vs hypercoagulable state  -[ST1.1]xarelto held for OR today[ST1.3]  -Consider lovenox post operatively if Hb stable and in setting of likely malignancy      Chronic stable conditions:   ##HTN- Cont PTA lisinopril   ##Type 2 DM, HgbA1c 6 on 04/11/17  -Hold home metformin while patient in hospital  -Start MSSI correction while inpatient  ## HLD- Cont PTA lipitor  ##MDD, BLAIR, panic  disorder- Cont PTA lexapro.   -psychology consult[ST1.1]ed and following[ST1.3]      Fluids: PO  Electrolytes: not checked  Nutrition:[ST1.1] NPO, resume diet post op[ST1.3]  Lines: PIV  Activity: -Up as tolerated  CODE: Full Code[ST1.1], POLST filled out and signed 7/7[ST1.3]  Prophylaxis: on xarelto (to be held today)  PCP communication:  - Morelia Ayon  Dispo: will remain inpatient until surgery on Friday 7/7/2017, following this will be discharged pending post-op recovery.             Interval History:   No acute events overnight.[ST1.1] Ready for OR today. Not feeling nervous or anxious. In good spirits. Is happy with her stay to date and the care she has received. No new concerns.[ST1.3]             Review of Systems:   Review of Systems   Respiratory: Negative for shortness of breath.    Cardiovascular: Negative for chest pain.   Gastrointestinal: Positive for abdominal pain. Negative for[ST1.1] constipation[ST1.3], diarrhea, nausea and vomiting.          Physical Exam (Resident / Clinician):     Vitals were reviewed  Patient Vitals for the past 8 hrs:   BP Temp Temp src Pulse Resp SpO2   07/07/17 1108 133/90 98.7  F (37.1  C) Oral 77 18 95 %   07/07/17 0620 121/86 97  F (36.1  C) Oral 81 16 93 %   07/07/17 0415 - - - - - 92 %   07/07/17 0414 125/78 98  F (36.7  C) Oral 74 16 90 %       Physical Exam   Constitutional: She appears well-developed and well-nourished. No distress.   Cardiovascular:[ST1.1] Normal rate[ST1.3] and[ST1.1] normal heart sounds[ST1.3].    Pulmonary/Chest: Effort normal. No respiratory distress. She has no wheezes. She has no rales.   Abdominal: She exhibits distension and mass (palpable mass). There is[ST1.1] no tenderness[ST1.3]. There is no guarding.   Skin: She is not diaphoretic.[ST1.1]   Left lateral thigh abscess s/p I&D, dressing blood-tinged. Not removed this morning.[ST1.3]    Psychiatric: She has a normal mood and affect. Her behavior is normal. Judgment and thought  content normal.[ST1.1]     Intake/Output Summary (Last 24 hours) at 07/07/17 1147  Last data filed at 07/07/17 1000   Gross per 24 hour   Intake             1040 ml   Output             3125 ml   Net            -2085 ml[ST1.4]           Data:   ROUTINE LABS (Last four results)  CMP    Recent Labs  Lab 07/03/17  0934 07/02/17  2255 07/01/17  1841    141 141   POTASSIUM 3.9 4.1 4.0   CHLORIDE 106 104 105   CO2 26 25 23   ANIONGAP 8 12 13   * 129* 118*   BUN 17 19 20   CR 0.88 0.84 0.82   GFRESTIMATED 65 68 70   GFRESTBLACK 78 83 85   COLE 8.5 8.4* 9.3   PROTTOTAL  --  6.6* 7.4   ALBUMIN  --  3.2* 3.6   BILITOTAL  --  0.7 0.4   ALKPHOS  --  92 103   AST  --  27 21   ALT  --  14 17     CBC    Recent Labs  Lab 07/07/17  1138 07/06/17  0749 07/05/17  0910 07/04/17  1345   WBC 6.6 5.2 6.9 8.8   RBC 3.43* 3.34* 2.86* 3.19*   HGB 9.4* 9.1* 7.8* 8.6*   HCT 29.4* 28.4* 24.5* 27.0*   MCV 86 85 86 85   MCH 27.4 27.2 27.3 27.0   MCHC 32.0 32.0 31.8 31.9   RDW 14.7 14.6 14.8 14.6    295 289 303     INR    Recent Labs  Lab 07/02/17  2255   INR 1.17*     CRP    Recent Labs  Lab 07/06/17  0823 07/05/17  0714 07/04/17  1345   CRP 49.0* 84.0* 110.0*         Blood culture:  Invalid input(s): BC   Urine culture:  No results for input(s): URC in the last 168 hours.  All cultures:    Recent Labs  Lab 07/02/17  2302 07/02/17  2253   CULT No growth after 4 days No growth after 4 days           Medications:     Current Facility-Administered Medications   Medication     ceFAZolin sodium-dextrose (ANCEF) infusion 2 g     ceFAZolin (ANCEF) 1 g vial to attach to  ml bag for ADULT or 50 ml bag for PEDS     fentaNYL Citrate (PF) (SUBLIMAZE) injection 25-50 mcg     midazolam (VERSED) injection 1-2 mg     naloxone (NARCAN) injection 0.1-0.4 mg     flumazenil (ROMAZICON) injection 0.2 mg     [Auto Hold] ceFAZolin sodium-dextrose (ANCEF) infusion 2 g     lactated ringers infusion     [Rx hold ] rivaroxaban ANTICOAGULANT  (XARELTO) tablet 20 mg     [Auto Hold] senna-docusate (SENOKOT-S;PERICOLACE) 8.6-50 MG per tablet 2 tablet     [Auto Hold] polyethylene glycol (MIRALAX/GLYCOLAX) Packet 17 g     [Auto Hold] HYDROmorphone (DILAUDID) tablet 2-4 mg     [Auto Hold] amoxicillin-clavulanate (AUGMENTIN) 875-125 MG per tablet 1 tablet     [Auto Hold] albuterol (PROAIR HFA/PROVENTIL HFA/VENTOLIN HFA) Inhaler 2 puff     [Auto Hold] atorvastatin (LIPITOR) tablet 80 mg     [Auto Hold] cholecalciferol (vitamin D) tablet 2,000 Units     [Auto Hold] escitalopram (LEXAPRO) tablet 20 mg     [Auto Hold] fluticasone (FLONASE) 50 MCG/ACT spray 2 spray     [Auto Hold] lisinopril (PRINIVIL/ZESTRIL) tablet 10 mg     [Auto Hold] naloxone (NARCAN) injection 0.1-0.4 mg     Patient is already receiving anticoagulation with heparin, enoxaparin (LOVENOX), warfarin (COUMADIN)  or other anticoagulant medication     [Auto Hold] ondansetron (ZOFRAN-ODT) ODT tab 4 mg    Or     [Auto Hold] ondansetron (ZOFRAN) injection 4 mg     [Auto Hold] glucose 40 % gel 15-30 g    Or     [Auto Hold] dextrose 50 % injection 25-50 mL    Or     [Auto Hold] glucagon injection 1 mg     [Auto Hold] insulin aspart (NovoLOG) inj (RAPID ACTING)     [Auto Hold] insulin aspart (NovoLOG) inj (RAPID ACTING)     [Auto Hold] acetaminophen (TYLENOL) tablet 1,000 mg     [Auto Hold] multivitamin, therapeutic with minerals (THERA-VIT-M) tablet 1 tablet     [Auto Hold] vitamin B complex with vitamin C (STRESS TAB) tablet 1 tablet       Caring Physician: Daniela Vick MD  [facility] [place]  Pager Contact: see Physician sticky note[ST1.1]       Revision History        User Key Date/Time User Provider Type Action    > ST1.3 7/7/2017 12:31 PM Eli Vick MD Resident Sign     ST1.2 7/7/2017 12:00 PM Eli Vick MD Resident      ST1.4 7/7/2017 11:47 AM Eli Vick MD Resident      ST1.1 7/7/2017 11:46 AM Eli Vick MD Resident             Progress Notes by Kadeem,  Penny Ortega MD at 7/11/2017  3:58 AM     Author:  Penny Quan MD Service:  Gyn/Onc Author Type:  Resident    Filed:  7/11/2017  3:59 AM Date of Service:  7/11/2017  3:58 AM Creation Time:  7/11/2017  3:58 AM    Status:  Signed :  Penny Quan MD (Resident)         Patient is having urinary incontinence and reports this happens when she gets a UTI. Will check a UA.    Penny Quan MD PhD  Ob/Gyn PGY-2  7/11/2017 3:59 AM[MS1.1]       Revision History        User Key Date/Time User Provider Type Action    > MS1.1 7/11/2017  3:59 AM Penny Quan MD Resident Sign            Progress Notes by Penny Quan MD at 7/11/2017  1:02 AM     Author:  Penny Quan MD Service:  Gyn/Onc Author Type:  Resident    Filed:  7/11/2017  1:15 AM Date of Service:  7/11/2017  1:02 AM Creation Time:  7/11/2017  1:02 AM    Status:  Signed :  Penny Quan MD (Resident)         Patient requested to speak with MD regarding constipation.[MS1.1]  Patient is constipated and uncomfortable. Ordered milk of magnesia and dulcolax suppository. Patient is hesitant that these will work. Discuss patient if these do not work we can order and enema.[MS1.2]     Penny Quan MD PhD  Ob/Gyn PGY-2  7/11/2017 1:15 AM[MS1.3]       Revision History        User Key Date/Time User Provider Type Action    > MS1.3 7/11/2017  1:15 AM Penny Quan MD Resident Sign     MS1.2 7/11/2017  1:04 AM Penny Quan MD Resident      MS1.1 7/11/2017  1:02 AM Penny Quan MD Resident             Progress Notes by Shweta Sanders MSW at 7/10/2017  1:40 PM     Author:  Shweta Sanders MSW Service:  Social Work Author Type:      Filed:  7/10/2017  3:49 PM Date of Service:  7/10/2017  1:40 PM Creation Time:  7/10/2017  1:40 PM    Status:  Addendum :  Shweta Sanders MSW ()         Social Work Services Progress Note    Hospital Day:  9  Date of Initial Social Work Evaluation:  07/05/17  Collaborated with:  Pt, pt's brother (Phil: [phone]), medical team/NP Concepcion    Data:  Pt medically ready to d/c tomorrow and in need of TCU    Intervention:  SW met with pt. Pt gave SW permission to speak with pt's brother, Phil. Pt called [facility] insurance to inquire about TCU coverage while SW was in the room. Pt asked SW to refer to [facility], [facility], and [facility].    SW left VM and faxed referral to [facility] (p: [phone]; f: [phone]) and [facility] (p: [phone]; f: [phone]). SW asked [facility] to follow.  SW spoke with pt's brother, Phli, and updated with d/c plan. Phil stated that he will be coming into town in the next couple days.[AD1.1]  SW received PC from [facility] stating that they will call in the morning if they can admit pt.[AD1.2]    Assessment:  Pt in agreement to d/c to TCU.     Plan:    Anticipated Disposition:  Facility:  TCU ([facility])    Barriers to d/c plan:  Medical stability    Follow Up:  SW will continue to follow to assist with d/c plan.    MARY Marie, [address] Surgical Oncology Unit  Phone: [phone]  Pager: [phone][AD1.1]           Revision History        User Key Date/Time User Provider Type Action    > AD1.2 7/10/2017  3:49 PM Shweta Sanders MSW  Addend     AD1.1 7/10/2017  2:04 PM Shweta Sanders MSW  Sign            Progress Notes by Laura Pack MD at 7/10/2017  9:14 AM     Author:  Laura Pack MD Service:  Gyn/Onc Author Type:  Physician    Filed:  7/10/2017 10:34 AM Date of Service:  7/10/2017  9:14 AM Creation Time:  7/9/2017  8:15 PM    Status:  Signed :  Laura Pack MD (Physician)         GYN ONC PROGRESS NOTE  07/10/17[MS1.1]    HD#:9 /POD#:3 XL, HELENE, LSO, omentectomy, evacuation of abdominal fluid, PPALND  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  Pain is[MS1.1] controlled with pain medications[MS1.3].  Eating and drinking[MS1.1] without nausea or vomiting[MS1.3].[MS1.1]  Denies f[MS1.3]latus[MS1.1] or[MS1.3] BM.  Voiding spontaneously.[MS1.1] Denies chest pain, SOB or dizziness.[MS1.3]     Objective:[MS1.1]   Vitals:    07/09/17 1952 07/09/17 2158 07/10/17 0059 07/10/17 0411   BP: 114/68 121/76     BP Location: Right arm Right arm     Pulse: 98 100     Resp: 16 15     Temp: 98.8  F (37.1  C) 100.1  F (37.8  C) 98.2  F (36.8  C)    TempSrc: Oral Oral Oral    SpO2: 96% 96% 95% 94%   Weight:       Height:[MS1.4]             Gen-[MS1.1] alert, no distress, cooperative[MS1.3]  CV-[MS1.1] Regular rate and rhythm, no murmur present[MS1.3]  Pulm-[MS1.1] Normal - Clear to auscultation without rales, rhonchi, or wheezing. and bilateral air exchange present  Abd- soft, appropriately tender, non distended  Abd- soft, appropriately tender, non distended  Incision- dry and intact  Extr- 1+ LE edema, SCD in place on Rt leg, bite on left leg is covered by a bandage which is dry but has some shadowing[MS1.3]    I/Os  (Yesterday // Since Midnight)  PO[MS1.1] 1230[MS1.5]cc //[MS1.1] 0[MS1.5]cc  IVF[MS1.1] 3000[MS1.5]cc //[MS1.1] 0[MS1.5]cc  Urine[MS1.1] 875[MS1.5]cc //[MS1.1] 520[MS1.5]cc    New Labs/Imaging-[MS1.1]   Results for orders placed or performed during the hospital encounter of 07/02/17 (from the past 24 hour(s))   Glucose by meter   Result Value Ref Range    Glucose 182 (H) 70 - 99 mg/dL   Glucose by meter   Result Value Ref Range    Glucose 134 (H) 70 - 99 mg/dL   Glucose by meter   Result Value Ref Range    Glucose 119 (H) 70 - 99 mg/dL   Glucose by meter   Result Value Ref Range    Glucose 118 (H) 70 - 99 mg/dL   Glucose by meter   Result Value Ref Range    Glucose 123 (H) 70 - 99 mg/dL[MS1.4]        Pending cultures (date obtained):[MS1.1] None    Assessment: Ms. Corona is a 62 year old female with a borderline ovarian tumor that is POD#3 from a XL, HELENE, LSO, omentectomy, evacuation of abdominal fluid, PPALND, postoperative course complicated by poor pain control, improving.      Active Problem list:    - postoperative state  - borderline ovarian tumor  - DVT  - anemia  - HTN  - Cellulitis  - T2DM     FEN:   - Regular diet     Pain:   - Scheduled tylenol and gabapentin. PRN PO dilaudid.      Heme:   - Recent diagnosis on 6/5 of unprovoked LLE DVT, on Xarelto per hematology.    - Chronic anemia, Hgb 9.6 > 8.6[MS1.2]>7.6, hemodilution vs slow intraabdominal bleeding. Will continue to monitor, transfuse if Hgb <7.[KB1.1]      CV:   - HTN, HLD. home Lisinopril held as blood pressures have been low-normal.  Continue to monitor.     Pulm:   - No prior hx of lung dz, however pt with SOB and wheezing on exam.[MS1.2] CXR WNL on admission. Requiring 1-2L oxygen to maintain gurpreet >90% since surgery. Consider atelectasis vs mild pulmonary edema 2/2 fluid administration vs PE. Encourage IS. No indication for CTA as pt is already on anticoagulation. Consider CXR if increasing O2 requirements or pt develops fever.[KB1.1]      GI:   - NI    :  - Chatterjee catheter in place, removed last night once patient had better pain control.  Pt[MS1.2] voiding spontaneously.[KB1.1]      ID:   - Cellulitis: Pt has L thigh cellulitis 2/2 dog bite, febrile on admission. s/p IV Vanco/Zosyn. >24hr afebrile.[MS1.2] Today is[MS1.5] D#[MS1.6]7[MS1.5] of[MS1.6] 7 days Augmentin[MS1.2].[MS1.6] WOC nurse to evaluate.[KB1.1]      Endocrine:   - T2DM. Medium SSI. Metformin held.  Blood glucose mildly elevated, continue to monitor closely.     Psych/Neuro:   - anxiety/depression, on home Lexapro     PPX:   - SCD R leg[MS1.2], Xarelto as above.[KB1.1]      Disposition:[MS1.2] Possible d/c to TCU in 1-2 days.[KB1.1]     Corry Segura[KB1.2],  MD[KB1.1]  Ob/Gyn PGY-[MS1.5]1[KB1.1]  7/10/2017[MS1.5] 9:14 AM[KB1.2]     Pager [phone][MS1.1]       The patient was seen and examined with the resident, the labs and vital signs were reviewed.The medical care outlined above was provided under my directives and direct supervision.    Laura Pack MD, MPH  Gynecology Oncology[RG1.1]       Revision History        User Key Date/Time User Provider Type Action    > RG1.1 7/10/2017 10:34 AM Laura Pack MD Physician Sign     KB1.2 7/10/2017  9:15 AM Corry Segura MD Resident Share     KB1.1 7/10/2017  9:08 AM Corry Segura MD Resident      MS1.4 7/10/2017  5:53 AM Penny Quan MD Resident Share     MS1.3 7/10/2017  5:49 AM Penny Quan MD Resident      MS1.5 7/10/2017  5:43 AM Penny Quan MD Resident Share     MS1.6 7/9/2017  8:31 PM Penny Quan MD Resident Share     MS1.2 7/9/2017  8:26 PM Penny Quan MD Resident Share     MS1.1 7/9/2017  8:15 PM Penny Quan MD Resident Share            Progress Notes by Pranav Pritchard MD at 7/8/2017 11:01 AM     Author:  Pranav Pritchard MD Service:  Anesthesiology Author Type:  Resident    Filed:  7/8/2017 11:09 AM Date of Service:  7/8/2017 11:01 AM Creation Time:  7/8/2017 11:01 AM    Status:  Attested :  Pranav Pritchard MD (Resident)    Cosigner:  Mohinder Nguyen MD at 7/9/2017  1:43 PM        Attestation signed by Mohinder Nguyen MD at 7/9/2017  1:43 PM        Physician Attestation   I agree with the information in this note.    Mohinder Nguyen MD                               REGIONAL ANESTHESIA PAIN SERVICE CONTINUOUS NERVE INFUSION NOTE  SUBJECTIVE:  Interval History: Pt reports adequate pain control via continuous peripheral nerve block (CPNB) infusion.  Increased pain overnight requiring bolus, however pain is better controlled this AM.  No changes in neurologic status on exam.  Denies any weakness, paresthesias, circumoral numbness,  metallic taste or tinnitus.  Pt is ambulating with assistance.  Patient is currently with no nausea or vomiting.        Clinically Aligned Pain Assessment (CAPA):   Comfort (How is your pain?): Tolerable with discomfort  Change in Pain (Since your last medication/intervention?): Getting better  Pain Control (How are your pain treatments working?):  Partially effective pain control  Functioning (Are you able to do activities to get better?) : Can do most things, but pain gets in the way of some   Sleep (Does your pain management allow you to sleep or rest?): Awake with occasional pain     Numerical Rating Scale:  4/10 at rest and 7/10 with movement.        Anticoagulation:  Lovenox 40 mg QD      OBJECTIVE:    Diagnostic:  Lab Results   Component Value Date    WBC 8.8 07/08/2017     Lab Results   Component Value Date    RBC 3.00 07/08/2017     Lab Results   Component Value Date    HGB 8.3 07/08/2017     Lab Results   Component Value Date    HCT 25.7 07/08/2017     Lab Results   Component Value Date     07/08/2017         Vitals:    Temp:  [35.7  C (96.2  F)-37.3  C (99.2  F)] 37.3  C (99.2  F)  Pulse:  [76-80] 76  Heart Rate:  [] 103  Resp:  [6-19] 11  BP: ()/() 145/84  MAP:  [97 mmHg-108 mmHg] 108 mmHg  Arterial Line BP: (133-143)/(70-77) 143/77  SpO2:  [92 %-100 %] 94 %    Exam:    Strength 5/5 and symmetric grossly in bilateral LE   T9 Epidural catheter site intact with dressing c/d/i, no tenderness, erythema, heme, edema      ASSESSMENT/PLAN:    Hafsa Corona is a 62 year old female POD #1 s/p COMBINED LAPAROTOMY, TUMOR DEBULKING  SALPINGO-OOPHORECTOMY BILATERAL  COMBINED HYSTERECTOMY TOTAL ABDOMINAL, SALPINGO-OOPHORECTOMY, NODE DISSECTION and placement of T9 epidural catheter with  for analgesia.  Pt is receiving adequate analgesia with bupivacaine 0.125%, total infusion 10 mL/hour.  Pt is ambulating without difficulty.  No weakness or paresthesias, adequate sensory block.  No evidence of  adverse side effects associated with local anesthetic.     - continue current total infusion of 10 mL/hour  - will continue to follow and adjust as needed  - Monitor  On-Q pump for depletion and replacement  - discussed plan with attending anesthesiologist    Pranav Pritchard Jr., MD  Anesthesia Resident - CA2  Pager: [phone]  7/8/2017  11:01 AM  [facility] Anest[facility] Pain Service      24 hour Job Code Pager.  For in-house use only.     [place]:  * * [phone]  [employer]: * * [phone]  Peds: * * [phone]  Enter call-back number and #      This pager only accepts text messages through [employer][RS1.1]       Revision History        User Key Date/Time User Provider Type Action    > RS1.1 7/8/2017 11:09 AM Pranav Pritchard MD Resident Sign            Progress Notes by Pranav Pritchard MD at 7/9/2017  4:06 AM     Author:  Pranav Pritchard MD Service:  Anesthesiology Author Type:  Resident    Filed:  7/9/2017  4:10 AM Date of Service:  7/9/2017  4:06 AM Creation Time:  7/9/2017  4:06 AM    Status:  Attested :  Pranav Pritchard MD (Resident)    Cosigner:  Mohinder Nguyen MD at 7/9/2017  1:43 PM        Attestation signed by Mohinder Nguyen MD at 7/9/2017  1:43 PM        Physician Attestation   I agree with the information in this note.    Mohinder Nguyen MD                               Called by RN to assess patient for increased pain. Upon arrival, pt complaining of inadequate pain coverage.  States that her pain is around 8/10 without her PCA bolus and 6/10 with the bolus.  Primary team has doubled her PCA bolus since my last check.  Per RN, patient seemed to be doing well until around midnight, pain increased significantly after ambulation.  Pt denied symptoms of LAST. Pt hemodynamically stable.  Inspected infusion tubing, which appeared patent.  Catheter site clean, dry and intact. Bolused patient with 5 cc of 0.25% bupivicaine.  Increased infusion to 14 ml/hr.  Patient reported improvement of symptoms within 10 minutes.   No signs of LAST upon recheck.  No change in neurologic status.  Patient with 5/5 strength UE and LE bilaterally.  Instructed nurse to monitor VS q5min x1hr.    Pranav Pritchard Jr., MD  Anesthesia Resident - CA2  Pager: [phone]  7/9/2017  4:06 AM[RS1.1]       Revision History        User Key Date/Time User Provider Type Action    > RS1.1 7/9/2017  4:10 AM Pranav Pritchard MD Resident Sign            Progress Notes by Pranav Pritchard MD at 7/8/2017  4:19 PM     Author:  Pranav Pritchard MD Service:  Anesthesiology Author Type:  Resident    Filed:  7/8/2017  4:44 PM Date of Service:  7/8/2017  4:19 PM Creation Time:  7/8/2017  4:19 PM    Status:  Attested :  Pranav Pritchard MD (Resident)    Cosigner:  Mohinder Nguyen MD at 7/9/2017  1:43 PM        Attestation signed by Mohinder Nguyen MD at 7/9/2017  1:43 PM        Physician Attestation   I agree with the information in this note.    Mohinder Nguyen MD                               Called by nurse to assess patient for increased pain. Upon arrival,patient states that her pain is 7/10 before using the PCA and 4/10 afterwards .  Pt denied symptoms of LAST. Pt hemodynamically stable. Bolused patient 5cc of 0.25% bupivicaine.  Increased epidural infusion from 10 ml/hr to 12 ml/hr  Patient reported improvement of symptoms within 10 minutes.  No signs of LAST upon recheck.[RS1.1]  Neurologic function intact and unchanged on exam.[RS1.2]  Instructed nurse to monitor VS q5min x1hr.    Pranav Pritchard Jr., MD  Anesthesia Resident - CA2  Pager: [phone]  7/8/2017  4:19 PM[RS1.1]     Revision History        User Key Date/Time User Provider Type Action    > RS1.2 7/8/2017  4:44 PM Pranav Pritchard MD Resident Sign     RS1.1 7/8/2017  4:20 PM Pranav Pritchard MD Resident Sign            Progress Notes by Airam Miller PT at 7/9/2017  1:28 PM     Author:  Wilczek, Airam, PT Service:  (none) Author Type:  Physical Therapist    Filed:  7/9/2017  1:29 PM Date of Service:  7/9/2017  1:28 PM  "Creation Time:  7/9/2017  1:28 PM    Status:  Signed :  Airam Miller, PT (Physical Therapist)          07/09/17 1000   Quick Adds   Type of Visit Initial PT Evaluation  (Airam Miller, PT, DPT )       Present no   Language English    Living Environment   Lives With alone   Living Arrangements other (see comments)  (duplex)   Home Accessibility bed and bath on same level;stairs to enter home;stairs (2 railings present)  (laundry in basement (full flight - L side handrail))   Number of Stairs to Enter Home 5   Number of Stairs Within Home 12  (full flight to basement )   Stair Railings at Home outside, present at both sides;inside, present on left side   Transportation Available car   Living Environment Comment Pt lives alone in single story duplex with basement (laundry located in basement, full flight down with handrail), [address]  home. Pt lives with two \"big\" dogs and a cat. Pt notes that home is messy and that dogs remain home alone for long hours dt her work schedule so she is required to  \"messes\". Pt reporting minimal assist available with brothers living in [place] and friends assisting their families.    Self-Care   Dominant Hand right   Usual Activity Tolerance good   Current Activity Tolerance fair   Regular Exercise no   Equipment Currently Used at Home none   Activity/Exercise/Self-Care Comment Pt denies regular exercise - reports walking dogs at times. Pt was working FT PTA. Indep with mobility    Functional Level Prior   Ambulation 0-->independent   Transferring 0-->independent   Toileting 0-->independent   Bathing 0-->independent   Dressing 0-->independent   Eating 0-->independent   Communication 0-->understands/communicates without difficulty   Swallowing 0-->swallows foods/liquids without difficulty   Cognition 0 - no cognition issues reported   Fall history within last six months no   Which of the above functional risks had a recent onset or change? " ambulation;transferring   Prior Functional Level Comment indep with mobility and ADLs prior to admit   General Information   Onset of Illness/Injury or Date of Surgery - Date 07/02/17  (hospital admit )   Referring Physician Estefania Hayden MD   Patient/Family Goals Statement none stated   Pertinent History of Current Problem (include personal factors and/or comorbidities that impact the POC) 62 year old female with h/o right salpingo-oopherectomy (2007), DVT (diagnosed 6/5/2017) on xarelto, DM2, HLD, admitted for evaluation of pelvic mass on CT concerning for gyn malignancy, on OR today with gyn/onc for both diagnosis and treatment. Also managing LUE abscess s/p I&D 7/2. Is now S/p exploratory laparotomy, HELENE,Left salpingo-oophorectomy.    Precautions/Limitations fall precautions   Weight-Bearing Status - LUE full weight-bearing   Weight-Bearing Status - RUE full weight-bearing   Weight-Bearing Status - LLE full weight-bearing   Weight-Bearing Status - RLE full weight-bearing   General Info Comments activity: up with assist    Cognitive Status Examination   Orientation orientation to person, place and time   Level of Consciousness alert   Follows Commands and Answers Questions 100% of the time   Personal Safety and Judgment intact   Memory intact   Pain Assessment   Patient Currently in Pain Yes, see Vital Sign flowsheet   Integumentary/Edema   Integumentary/Edema no deficits were identifed   Posture    Posture Not impaired   Range of Motion (ROM)   ROM Comment WFL   Strength   Strength Comments at least 3/5    Bed Mobility   Bed Mobility Comments mod I - utilized bed rail for rolling    Transfer Skills   Transfer Comments required HOB to be slightly elevated and use of bedrail for UE support to perform supine<>sit. Relies heavily on UE for STS transfers    Gait   Gait Comments dec gait speed and step length jin, utilized IV pole for support, limited by pain    Balance   Balance Comments Not formally assessed, no  "overt LOB    Sensory Examination   Sensory Perception no deficits were identified   Coordination   Coordination no deficits were identified   Muscle Tone   Muscle Tone no deficits were identified   General Therapy Interventions   Planned Therapy Interventions balance training;gait training;neuromuscular re-education;strengthening;transfer training;progressive activity/exercise;home program guidelines   Clinical Impression   Criteria for Skilled Therapeutic Intervention yes, treatment indicated   PT Diagnosis Dec functional mobility   Influenced by the following impairments pain, dec strength, dec activity tolerance    Functional limitations due to impairments gait, transfers, balance, bed mobility, edurance    Clinical Presentation Stable/Uncomplicated   Clinical Presentation Rationale clinical judgement    Clinical Decision Making (Complexity) Low complexity   Therapy Frequency` 5 times/week   Predicted Duration of Therapy Intervention (days/wks) 7/16/17   Anticipated Equipment Needs at Discharge (TBD)   Anticipated Discharge Disposition Transitional Care Facility   Risk & Benefits of therapy have been explained Yes   Patient, Family & other staff in agreement with plan of care Yes   Clinical Impression Comments PT eval completed, tx inditiated    [facility] AM-PAC TM \"6 Clicks\"   2016, Trustees of [facility], under license to [employer].  All rights reserved.   6 Clicks Short Forms Basic Mobility Inpatient Short Form   [facility] AM-PAC  \"6 Clicks\" V.2 Basic Mobility Inpatient Short Form   1. Turning from your back to your side while in a flat bed without using bedrails? 4 - None   2. Moving from lying on your back to sitting on the side of a flat bed without using bedrails? 3 - A Little   3. Moving to and from a bed to a chair (including a wheelchair)? 4 - None   4. Standing up from a chair using your arms (e.g., wheelchair, or bedside chair)? 4 - None   5. To walk in hospital room? 4 - " None   6. Climbing 3-5 steps with a railing? 2 - A Lot   Basic Mobility Raw Score (Score out of 24.Lower scores equate to lower levels of function) 21   Total Evaluation Time   Total Evaluation Time (Minutes) 6[EW1.1]        Revision History        User Key Date/Time User Provider Type Action    > EW1.1 7/9/2017  1:29 PM Airam Miller, PT Physical Therapist Sign            Progress Notes by Estefania Hayden MD at 7/9/2017  8:03 AM     Author:  Estefania Hayden MD Service:  Gyn/Onc Author Type:  Resident    Filed:  7/9/2017  8:20 AM Date of Service:  7/9/2017  8:03 AM Creation Time:  7/8/2017 11:36 PM    Status:  Signed :  Estefania Hayden MD (Resident)         GYN ONC PROGRESS NOTE  07/0[TB1.1]9[TB1.2]/17    HD#:8 /POD#:2 XL, HELENE, LSO, omentectomy, evacuation of abdominal fluid, PPALND  Disease: Stage 1C borderline ovarian tumor    24 hour events:   - Poor pain control, gabapentin added    Subjective:[TB1.1] Improved[TB1.3] pain[TB1.1] control with IV Dilaudid push and gabapentin.  Denies[TB1.3] nausea,[TB1.1] denies[TB1.3] vomiting.[TB1.1]  Passed small flatus[TB1.3] flatus.[TB1.1]  Denies[TB1.3] chest pain or shortness of breath.      Objective:[TB1.1]   Vitals:    07/09/17 0450 07/09/17 0455 07/09/17 0519 07/09/17 0649   BP: 107/72 134/79  113/71   BP Location:  Right arm  Right arm   Pulse:   103 96   Resp:   12 18   Temp:    97.7  F (36.5  C)   TempSrc:    Oral   SpO2:  92% 93% 93%   Weight:       Height:[TB1.4]             Gen- no distress, tired  CV- Regular rate and rhythm, No murmurs, rubs or gallops  Pulm- Normal - Clear to auscultation without rales, rhonchi, or wheezing. and bilateral air exchange present  Abd- soft, appropriately tender, non distended  Incision- dry and intact  Extr- Normal and 1+ edema    I/Os[TB1.1]  I/O last 3 completed shifts:  In: 3440 [P.O.:440; I.V.:3000]  Out: 1375 [Urine:1375][TB1.5]    New Labs/Imaging-[TB1.1]     Results for orders placed or performed during the hospital  encounter of 07/02/17 (from the past 24 hour(s))   Glucose by meter   Result Value Ref Range    Glucose 127 (H) 70 - 99 mg/dL   Glucose by meter   Result Value Ref Range    Glucose 165 (H) 70 - 99 mg/dL   Glucose by meter   Result Value Ref Range    Glucose 133 (H) 70 - 99 mg/dL   Glucose by meter   Result Value Ref Range    Glucose 139 (H) 70 - 99 mg/dL[TB1.6]     Assessment: Ms. Corona is a 62 year old female with a borderline ovarian tumor that is POD#2 from a XL, HELENE, LSO, omentectomy, evacuation of abdominal fluid, PPALND, postoperative course complicated by poor pain control[TB1.1], improving[TB1.7].     Active Problem list:    - postoperative state  - borderline ovarian tumor  - DVT  - anemia  - HTN  - Cellulitis  - T2DM    FEN:   - Regular diet    Pain:   - Tylenol[TB1.1]. D[TB1.7]ilaudid PCA[TB1.1] &[TB1.7] epidural to be discontinued today.  Gabapentin added last night as adjunct.[TB1.1]  Will transition to PO pain meds.[TB1.7]    Heme:   - Recent diagnosis on 6/5 of unprovoked LLE DVT, on Xarelto per hematology[TB1.1],[TB1.2] held 24 hours prior to surgery, plan to restart after epidural removal today.  Pt received one dose of Lovenox 40mg yesterday.   - Chronic anemia, Hgb 9.6 > 8.6 (likely hemodilution), will continue to monitor     CV:   - HTN, HLD. home Lisinopril held as blood pressures have been low-normal.  Continue to monitor.    Pulm:   - No prior hx of lung dz, however pt with SOB and wheezing on exam. Advair inhaler ordered per primary team, non-concerning CXR    GI: NI  :  - Chatterjee catheter in place, removed last night once patient had better pain control.  Pt has voided    ID:   - Cellulitis: Pt has L thigh cellulitis 2/2 dog bite, febrile on admission. s/p IV Vanco/Zosyn. >24hr afebrile.  Currently on Augmentin, d/c today for 7 days total antibiotics.    Endocrine:   - T2DM. Medium SSI. Metformin held.  Blood glucose mildly elevated yesterday, continue to monitor closely.    Psych/Neuro:   -  anxiety/depression, on home Lexapro    PPX:   -[TB1.1] SCD R leg[TB1.2]    Disposition: pending postoperative course and pain control    Estefania Hayden MD  PGY-2 OBGYN  Gynecologic Oncology service pager # [phone][TB1.1]           Revision History        User Key Date/Time User Provider Type Action    > [N/A] 7/9/2017  8:20 AM Estefania Hayden MD Resident Sign     TB1.2 7/9/2017  8:04 AM Estefania Hayden MD Resident Sign     TB1.6 7/9/2017  7:57 AM Estefania Hayden MD Resident Sign     TB1.5 7/9/2017  7:54 AM Estefania Hayden MD Resident      TB1.7 7/9/2017  7:53 AM Estefania Hayden MD Resident      TB1.4 7/9/2017  7:15 AM Estefania Hayden MD Resident Share     TB1.3 7/9/2017  7:01 AM Estefania Hayden MD Resident      TB1.1 7/9/2017 12:13 AM Estefania Hayden MD Resident Share            Progress Notes by Estefania Hayden MD at 7/9/2017  4:05 AM     Author:  Estefania Hayden MD Service:  Gyn/Onc Author Type:  Resident    Filed:  7/9/2017  4:08 AM Date of Service:  7/9/2017  4:05 AM Creation Time:  7/9/2017  4:05 AM    Status:  Signed :  Estefania Hayden MD (Resident)         Pt c/o breakthrough pain after getting up to go the bathroom and describes it as unbearable.  Will give Dilaudid 0.5mg PO x1.  Due to concern for somnolence, Luiza RN was asked to monitor O2 after this extra dose.      Estefania Hayden PGY2[TB1.1]     Revision History        User Key Date/Time User Provider Type Action    > TB1.1 7/9/2017  4:08 AM Estefania Hayden MD Resident Sign                     Procedure Notes      Procedures by Serena Cole MD at 7/7/2017  4:52 PM     Author:  Serena Cole MD Service:  Gyn/Onc Author Type:  Physician    Filed:  7/7/2017  4:54 PM Date of Service:  7/7/2017  4:52 PM Creation Time:  7/7/2017  4:41 PM    Status:  Addendum :  Serena Cole MD (Physician)     Pre-procedure Diagnoses:    1. Pelvic mass [R19.00]           Post-procedure Diagnoses:    1. 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 HYSTERECTOMY, PAP NO LONGER INDICATED [[id number] (Custom)]    2. SALPINGO-OOPHORECTOMY BILATERAL [[id number] (Custom)]    3. LYMPHADENECTOMY RETROPERITONEAL [[id number] (Custom)]    4. OMENTECTOMY [[id number] (Custom)]               [facility] Brief Operative Note    Pre-operative diagnosis: Pelvic Mass      Post-operative diagnosis Apparent Stage IC2 at least borderline tumor of the left ovary, final pathology pending.     Procedure: Exploratory laparotomy, total abdominal hysterectomy,[DT1.1] left[DT1.2] salpingo-oophorectomy, cancer staging including infracolic omentectomy, bilateral pelvic & para-aortic lymphadenectomy, peritoneal biopsies, evacuation of pelvic fluid.[DT1.1]    Anesthesia Block      - Wound Class: II-Clean Contaminated[DT1.3]   Surgeon:[DT1.1] Serena Mercado[DT1.3] Kana[DT1.1] MD Kelsey[DT1.3]     Assistants(s): Danielle Rhodes     Estimated blood loss: 300 ml    Specimens: 1) Left ovary + fallopian tube (frozen section pathology)  2) Uterus, cervix  3) Right pelvic lymph nodes  4) Left pelvic lymph nodes  5) Right para-aortic lymph nodes  6) Left para-aortic lymph nodes  7) Omentum  8) Right pelvic biopsy  9) Left pelvic biopsy  10) Right paracolic gutter biopsy  11) Left paracolic gutter biopsy  12) Bladder peritoneum  13) Posterior cul-de-sac       Findings: Large amount of dark blood on entering the pelvis. Large 20 cm mass arising from the left ovary. Smooth ovarian capsule. Evidence of preoperative rupture of the mass with spillage of hemorrhagic material. On frozen section pathology examination, findings were c/w at least borderline tumor. Enlarged left pelvic lymph nodes, however, not grossly replaced by tumor.[DT1.1] Right ovary and fallopian tube surgically absent.[DT1.2] No other evidence of extra-ovarian spread of disease, final pathology pending.    Complications: None apparent    Please see full dictated Operative Note for details.[DT1.1]    Serena  Jess Cole  7/7/2017  4:52 PM[DT1.4]          Revision History        User Key Date/Time User Provider Type Action    > DT1.2 7/7/2017  4:54 PM Serena Cole MD Physician Addend     DT1.4 7/7/2017  4:52 PM Serena Cole MD Physician Sign     DT1.3 7/7/2017  4:46 PM Serena Cole MD Physician      DT1.1 7/7/2017  4:41 PM Serena Cole MD Physician                      Progress Notes - Therapies (Notes from 07/09/17 through 07/12/17)      Progress Notes by Suki Johnson OT at 7/11/2017  3:01 PM     Author:  Suki Johnson OT Service:  (none) Author Type:  Occupational Therapist    Filed:  7/11/2017  3:01 PM Date of Service:  7/11/2017  3:01 PM Creation Time:  7/11/2017  3:01 PM    Status:  Signed :  Suki Johnson OT (Occupational Therapist)            07/11/17 1115   Rehab Discipline   Discipline OT   Type of Visit   Type of visit Initial Edema Evaluation   General Information   Start of care 07/11/17   Referring physician Concepcion Kohler APRN CNP   Orders Evaluate and treat as indicated   Order date 07/10/17   Medical diagnosis ovarian tumor   Onset of illness / date of surgery 07/02/17   Edema onset (since May in L LE, ~1 week in R LE)   Affected body parts LLE;RLE;Trunk   Edema etiology comments surgery/omentectomy, pelvic mass, hypoalbuminemia, decreased muscle pump, fluid overload   Pertinent history of current problem (PT: include personal factors and/or comorbidities that impact the POC; OT: include additional occupational profile info) Ms. Corona is a 62 year old female with a borderline ovarian tumor that is POD#4 from a XL, HELENE, LSO, omentectomy, evacuation of abdominal fluid, PPALND, postoperative course complicated by poor pain control, improving.    Surgical / medical history reviewed Yes   Prior level of functional mobility I without AD   Prior treatment (none)   Patient role / employment history Employed   Living environment House / townhome   Living  "environment comments Pt fatigued and with significant abdominal pain. Limited PLOF/background information obtained per pt request to limit talking. Per PT eval, \"Pt lives alone in single story duplex with basement (laundry located in basement, full flight down with handrail), [address]  home. Pt lives with two \"big\" dogs and a cat. Pt notes that home is messy and that dogs remain home alone for long hours dt her work schedule so she is required to  \"messes\". Pt reporting minimal assist available with brothers living in [place] and friends assisting their families. \"   Subjective Report   Patient report of symptoms Legs feel \"tight\" and \"heavy,\" making moving and dressing difficult   Precautions   Precautions (abdominal precautions)   Precautions comments may have had cellulitis in L LE, DVT (+) in May   Patient / Family Goals   Patient / family goals statement To lose weight in her legs   Pain   Patient currently in pain Yes, see vital signs flowsheet   Pain comments nauseous and with abdominal pain   Cognitive Status   Orientation Orientation to person, place and time   Level of consciousness Lethargic / Somnolent   Edema Exam / Assessment   Skin condition Dryness;Pitting   Skin condition comments L LE > R LE, pt reports she was diagnosed with blood clot in May and edema has existed ever since. Pt with taut 1+-2+ pitting in B LEs to the thigh, making ambulation and movement difficult. Abdomen distended. Pt with bruising/varicosities present on bilateral legs. Skin shiny, taut, with skin creasing noted at MTPs. Pt with area of discoloration on distal L LE. Mepilex dressing applied to lateral L LE, R LE edematous ~1 week since surgery per pt. Dryness noted.    Pitting 1+;2+   Capillary refill Symmetrical   Dorsal pedal pulse Symmetrical   Girth Measurements   Girth Measurements Refer to separate girth measurement flowsheet   Range of Motion   ROM comments unable to assess, pt declined   Strength   Strength comments " unable to assess, pt declined   Activities of Daily Living   Activities of Daily Living Pt lives alone, works FT assisting people find jobs. Pt was I with ADLs, driving, cooking, cleaning, laundry, medication management, and finances PTA. Pt takes care of 2 dogs and 1 cat.   Sensory   Sensory perception comments denied n/t   Planned Edema Interventions   Planned edema interventions Gradient compression bandaging;Fit for compression garment;Exercises;Precautions to prevent infection / exacerbation;Education;ADL training;Skin care / precautions;Home management program development   Clinical Impression   Criteria for skilled therapeutic intervention met Yes   Therapy diagnosis Decreased I/ease with ADLs   Influenced by the following impairments / conditions Edema;Wounds / Ulcers  (mepilex dressing over dog bite on L LE)   Assessment of Occupational Performance 3-5 Performance Deficits   Identified Performance Deficits home management, functional mobility/transfers, showering, dressing   Clinical Decision Making (Complexity) Low complexity   Treatment frequency 5 times / week  (BID OT/edema)   Treatment duration 1 week   Patient / family and/or staff in agreement with plan of care Yes   Risks and benefits of therapy have been explained Yes   Clinical impression comments Pt to benefit from skilled OT to address B LE edema, promote skin integrity, comfort, and ease/I with ADLs and IADLs for return to [facility]. See daily flowsheet for details regarding today's treatment.    Goals   Edema Eval Goals (IP) See plan of care for patient goals   Total Evaluation Time   Total evaluation time 8[BN1.1]        Revision History        User Key Date/Time User Provider Type Action    > BN1.1 7/11/2017  3:01 PM Suki Johnson OT Occupational Therapist Sign            Progress Notes by Airam Miller PT at 7/9/2017  1:28 PM     Author:  Airam Miller PT Service:  (none) Author Type:  Physical Therapist    Filed:  7/9/2017  1:29 PM Date  "of Service:  7/9/2017  1:28 PM Creation Time:  7/9/2017  1:28 PM    Status:  Signed :  Airam Miller, PT (Physical Therapist)          07/09/17 1000   Quick Adds   Type of Visit Initial PT Evaluation  (Airam Miller, PT, DPT )       Present no   Language English    Living Environment   Lives With alone   Living Arrangements other (see comments)  (duplex)   Home Accessibility bed and bath on same level;stairs to enter home;stairs (2 railings present)  (laundry in basement (full flight - L side handrail))   Number of Stairs to Enter Home 5   Number of Stairs Within Home 12  (full flight to basement )   Stair Railings at Home outside, present at both sides;inside, present on left side   Transportation Available car   Living Environment Comment Pt lives alone in single story duplex with basement (laundry located in basement, full flight down with handrail), [address]  home. Pt lives with two \"big\" dogs and a cat. Pt notes that home is messy and that dogs remain home alone for long hours dt her work schedule so she is required to  \"messes\". Pt reporting minimal assist available with brothers living in [place] and friends assisting their families.    Self-Care   Dominant Hand right   Usual Activity Tolerance good   Current Activity Tolerance fair   Regular Exercise no   Equipment Currently Used at Home none   Activity/Exercise/Self-Care Comment Pt denies regular exercise - reports walking dogs at times. Pt was working FT PTA. Indep with mobility    Functional Level Prior   Ambulation 0-->independent   Transferring 0-->independent   Toileting 0-->independent   Bathing 0-->independent   Dressing 0-->independent   Eating 0-->independent   Communication 0-->understands/communicates without difficulty   Swallowing 0-->swallows foods/liquids without difficulty   Cognition 0 - no cognition issues reported   Fall history within last six months no   Which of the above functional risks had a recent onset " or change? ambulation;transferring   Prior Functional Level Comment indep with mobility and ADLs prior to admit   General Information   Onset of Illness/Injury or Date of Surgery - Date 07/02/17  (hospital admit )   Referring Physician Estefania Hayden MD   Patient/Family Goals Statement none stated   Pertinent History of Current Problem (include personal factors and/or comorbidities that impact the POC) 62 year old female with h/o right salpingo-oopherectomy (2007), DVT (diagnosed 6/5/2017) on xarelto, DM2, HLD, admitted for evaluation of pelvic mass on CT concerning for gyn malignancy, on OR today with gyn/onc for both diagnosis and treatment. Also managing LUE abscess s/p I&D 7/2. Is now S/p exploratory laparotomy, HEELNE,Left salpingo-oophorectomy.    Precautions/Limitations fall precautions   Weight-Bearing Status - LUE full weight-bearing   Weight-Bearing Status - RUE full weight-bearing   Weight-Bearing Status - LLE full weight-bearing   Weight-Bearing Status - RLE full weight-bearing   General Info Comments activity: up with assist    Cognitive Status Examination   Orientation orientation to person, place and time   Level of Consciousness alert   Follows Commands and Answers Questions 100% of the time   Personal Safety and Judgment intact   Memory intact   Pain Assessment   Patient Currently in Pain Yes, see Vital Sign flowsheet   Integumentary/Edema   Integumentary/Edema no deficits were identifed   Posture    Posture Not impaired   Range of Motion (ROM)   ROM Comment WFL   Strength   Strength Comments at least 3/5    Bed Mobility   Bed Mobility Comments mod I - utilized bed rail for rolling    Transfer Skills   Transfer Comments required HOB to be slightly elevated and use of bedrail for UE support to perform supine<>sit. Relies heavily on UE for STS transfers    Gait   Gait Comments dec gait speed and step length jin, utilized IV pole for support, limited by pain    Balance   Balance Comments Not formally  "assessed, no overt LOB    Sensory Examination   Sensory Perception no deficits were identified   Coordination   Coordination no deficits were identified   Muscle Tone   Muscle Tone no deficits were identified   General Therapy Interventions   Planned Therapy Interventions balance training;gait training;neuromuscular re-education;strengthening;transfer training;progressive activity/exercise;home program guidelines   Clinical Impression   Criteria for Skilled Therapeutic Intervention yes, treatment indicated   PT Diagnosis Dec functional mobility   Influenced by the following impairments pain, dec strength, dec activity tolerance    Functional limitations due to impairments gait, transfers, balance, bed mobility, edurance    Clinical Presentation Stable/Uncomplicated   Clinical Presentation Rationale clinical judgement    Clinical Decision Making (Complexity) Low complexity   Therapy Frequency` 5 times/week   Predicted Duration of Therapy Intervention (days/wks) 7/16/17   Anticipated Equipment Needs at Discharge (TBD)   Anticipated Discharge Disposition Transitional Care Facility   Risk & Benefits of therapy have been explained Yes   Patient, Family & other staff in agreement with plan of care Yes   Clinical Impression Comments PT eval completed, tx inditiated    [facility] AM-PAC TM \"6 Clicks\"   2016, Trustees of [facility], under license to [employer].  All rights reserved.   6 Clicks Short Forms Basic Mobility Inpatient Short Form   [facility] AM-PAC  \"6 Clicks\" V.2 Basic Mobility Inpatient Short Form   1. Turning from your back to your side while in a flat bed without using bedrails? 4 - None   2. Moving from lying on your back to sitting on the side of a flat bed without using bedrails? 3 - A Little   3. Moving to and from a bed to a chair (including a wheelchair)? 4 - None   4. Standing up from a chair using your arms (e.g., wheelchair, or bedside chair)? 4 - None   5. To walk in hospital " room? 4 - None   6. Climbing 3-5 steps with a railing? 2 - A Lot   Basic Mobility Raw Score (Score out of 24.Lower scores equate to lower levels of function) 21   Total Evaluation Time   Total Evaluation Time (Minutes) 6[EW1.1]        Revision History        User Key Date/Time User Provider Type Action    > EW1.1 7/9/2017  1:29 PM Airam Miller, PT Physical Therapist Sign

## 2017-07-02 NOTE — IP AVS SNAPSHOT
"` `           [address] [facility]: [phone]                                              INTERAGENCY TRANSFER FORM - NURSING   2017                    Hospital Admission Date: 2017  MANA BRISENO   : 1955  Sex: Female        Attending Provider: Serena Cole MD     Allergies:  Sulfa Drugs, Wasps [Hornets]    Infection:  None   Service:  GYN/ONC    Ht:  1.613 m (5' 3.5\")   Wt:  101.5 kg (223 lb 11.2 oz)   Admission Wt:  95.6 kg (210 lb 12.8 oz)    BMI:  39.01 kg/m 2   BSA:  2.13 m 2            Patient PCP Information     Provider PCP Type    Morelia Ayon NP General      Current Code Status     Date Active Code Status Order ID Comments User Context       Prior      Code Status History     Date Active Date Inactive Code Status Order ID Comments User Context    2017  9:01 AM  Full Code [id number]  Concepcion Kohler APRN CNP Outpatient    2017  8:46 PM 2017  9:01 AM Full Code [id number]  Danielle Barrios MD Inpatient    7/3/2017  3:25 AM 2017  8:46 PM Full Code [id number]  Yolande Shankar MD Inpatient      Advance Directives        Does patient have a scanned Advance Directive/ACP document in EPIC?           No        Hospital Problems as of 2017              Priority Class Noted POA    Pelvic mass in female Medium  7/3/2017 Yes    S/P laparotomy Medium  2017 Yes      Non-Hospital Problems as of 2017              Priority Class Noted    Attention deficit disorder   2005    PANIC DISORDER    2005    VASOMOTOR RHINITIS   2005    Chronic Maxillary Sinusitis   2008    Tachycardia   Unknown    Type 2 diabetes mellitus without complication (H)   10/31/2010    HYPERLIPIDEMIA LDL GOAL <100   10/31/2010    Allergic rhinitis due to other allergen   2011    BMI > 35 Medium  10/14/2015    Essential hypertension Medium  2016    Lumbago Medium  2016    Major depressive disorder, recurrent episode, mild (H) Medium  2016    " Long-term (current) use of anticoagulants [Z79.01] Medium  6/6/2017    Deep vein thrombosis (DVT) (H) [I82.409] Medium  6/6/2017      Immunizations     Name Date      Influenza (IIV3) 01/10/13     Influenza (IIV3) 09/29/09     Influenza (IIV3) 11/21/08     Influenza (IIV3) 10/11/07     Influenza (IIV3) 11/09/06     Influenza Vaccine IM 3yrs+ 4 Valent IIV4 09/06/16     Influenza Vaccine IM 3yrs+ 4 Valent IIV4 02/04/16     Influenza Vaccine IM 3yrs+ 4 Valent IIV4 11/19/13     Influenza Vaccine, 3 YRS +, IM (QUADRIVALENT W/PRESERVATIVES) 11/21/14     Pneumococcal 23 valent 11/19/13     TDAP Vaccine (Adacel) 02/04/16          END      ASSESSMENT     Discharge Profile Flowsheet     EXPECTED DISCHARGE     COMMUNICATION ASSESSMENT      Expected Discharge Date  07/12/17 (TCU) 07/11/17 0943   Patient's communication style  spoken language (English or Bilingual) 07/02/17 2140    DISCHARGE NEEDS ASSESSMENT     SKIN      Equipment Currently Used at Home  none 07/09/17 1319   Inspection  Full 07/12/17 0507    Transportation Available  car 07/09/17 1319   Skin WDL  ex 07/12/17 0507    GASTROINTESTINAL (ADULT,PEDIATRIC,OB)     Skin Integrity  bruise(s);drain/device(s);incision(s) 07/12/17 0507    GI WDL  ex 07/12/17 0507   Skin areas NOT inspected  -- (mesh panties) 07/07/17 1812    Abdominal Appearance  obese 07/12/17 0356   Skin Temperature  warm 07/12/17 0507    All Quadrants Bowel Sounds  audible and active in all quadrants 07/12/17 0507   Skin Moisture  dry;flaky 07/12/17 0356    All Quadrants Palpation  soft/nontender 07/03/17 0021   SAFETY      Last Bowel Movement  07/11/17 07/12/17 0507   Safety WDL  WDL 07/12/17 0507    GI Signs/Symptoms  abdominal fullness;abdominal pain 07/12/17 0507   Safety Factors  ID band on;upper side rails raised x 2;call light in reach;bed in low position;wheels locked 07/12/17 0507    Passing flatus  no 07/12/17 0507                      Assessment WDL (Within Defined Limits) Definitions          "  Safety WDL     Effective: 09/28/15    Row Information: <b>WDL Definition:</b> Bed in low position, wheels locked; call light in reach; upper side rails up x 2; ID band on<br> <font color=\"gray\"><i>Item=AS safety wdl>>List=AS safety wdl>>Version=F14</i></font>      Skin WDL     Effective: 09/28/15    Row Information: <b>WDL Definition:</b> Warm; dry; intact; elastic; without discoloration; pressure points without redness<br> <font color=\"gray\"><i>Item=AS skin wdl>>List=AS skin wdl>>Version=F14</i></font>      Vitals     Vital Signs Flowsheet     VITAL SIGNS     Pain Control  partially effective 07/12/17 0447    Temp  98.1  F (36.7  C) 07/12/17 1154   Functioning  can do most things, but pain gets in the way of some 07/12/17 0447    Temp src  Oral 07/12/17 1154   Sleep  normal sleep 07/12/17 0632    Resp  17 07/12/17 1154   ANALGESIA SIDE EFFECTS MONITORING      Pulse  96 07/11/17 1015   Side Effects Monitoring: Respiratory Quality  R 07/12/17 0632    Heart Rate  101 07/12/17 1154   Side Effects Monitoring: Respiratory Depth  N 07/12/17 0632    Pulse/Heart Rate Source  Monitor 07/12/17 1154   Side Effects Monitoring: Sedation Level  S 07/12/17 0632    BP  116/68 07/12/17 1154   HEIGHT AND WEIGHT      BP Location  Right arm 07/12/17 1154   Height  1.613 m (5' 3.5\") 07/03/17 0336    OXYGEN THERAPY     Height Method  Stated 07/03/17 0336    SpO2  93 % 07/12/17 1154   Weight  101.5 kg (223 lb 11.2 oz) 07/12/17 1139    O2 Device  None (Room air) 07/12/17 1154   Weight Method  Standing scale 07/12/17 1139    Oxygen Delivery  1 LPM 07/12/17 0614   BSA (Calculated - sq m)  2.03 07/03/17 0336    RESPIRATORY MONITORING     BMI (Calculated)  35.61 07/03/17 0336    Respiratory Monitoring (EtCO2)  38 mmHg (dc'ing Capno now) 07/09/17 1055   POSITIONING      Integrated Pulmonary Index (IPI)  8-9 07/09/17 1055   Body Position  supine, head elevated;supine, legs elevated 07/12/17 0507    PAIN/COMFORT     Head of Bed (HOB)  HOB at 30 " degrees 07/12/17 0507    Patient Currently in Pain  sleeping: patient not able to self report 07/12/17 0632   Positioning/Transfer Devices  pillows;in use 07/10/17 0510    Preferred Pain Scale  CAPA (Clinically Aligned Pain Assessment) ([facility] Adults Only) 07/12/17 0447   Chair  Upright in chair 07/11/17 0904    0-10 Pain Scale  2 07/03/17 0332   DAILY CARE      Pain Location  Abdomen 07/12/17 0447   Activity Type  ambulated to bathroom 07/12/17 1017    Pain Orientation  Lower 07/12/17 0447   Activity Level of Assistance  assistance, stand-by 07/12/17 1017    Pain Descriptors  Cramping;Discomfort 07/12/17 0447   POINT OF CARE TESTING      Pain Management Interventions  analgesia administered 07/12/17 0447   Puncture Site  fingertip 07/05/17 1326    Pain Intervention(s)  Medication (See eMAR) 07/12/17 0447   Bedside Glucose (mg/dl )   109 mg/dl 07/05/17 1326    Response to Interventions  Absence of nonverbal indicators of pain 07/12/17 0632   ECG      CLINICALLY ALIGNED PAIN ASSESSMENT (CAPA) ([facility] ADULTS ONLY)     ECG Rhythm  Sinus tachycardia 07/07/17 2021    Comfort  tolerable with discomfort 07/12/17 0447   Ectopy  None 07/07/17 2021    Change in Pain  about the same 07/12/17 0447                 Patient Lines/Drains/Airways Status    Active LINES/DRAINS/AIRWAYS     Name: Placement date: Placement time: Site: Days: Last dressing change:    Wound with Packing 07/03/17 Left;Upper Thigh Other (comment) Dog bite 07/03/17   0500   Thigh   9     Incision/Surgical Site 07/07/17 Abdomen 07/07/17   1722    4             Patient Lines/Drains/Airways Status    Active PICC/CVC     None            Intake/Output Detail Report     Date Intake         Output     Net    Shift P.O. I.V. IV Piggyback Colloid Blood Components Total Urine Emesis/NG output Blood Total       Day 07/11/17 0000 - 07/11/17 0659 -- -- -- -- -- -- 625 -- -- 625 -625    [place] 07/11/17 0700 - 07/11/17 5239 480 -- --  -- -- 480 400 300 -- 700 -220    Noc 07/11/17 1500 - 07/11/17 2359 400 -- -- -- -- 400 325 -- -- 325 75    Day 07/12/17 0000 - 07/12/17 0659 120 -- -- -- -- 120 250 -- -- 250 -130    Etelvina 07/12/17 0700 - 07/12/17 1459 -- -- -- -- -- -- 175 -- -- 175 -175      Last Void/BM       Most Recent Value    Urine Occurrence 1 at 07/10/2017 1724    Stool Occurrence 1 at 07/11/2017 0825      Case Management/Discharge Planning     Case Management/Discharge Planning Flowsheet     LIVING ENVIRONMENT     [facility] CAREGIVER      Lives With  alone 07/09/17 1319   Filed Complexity Screen Score  7 07/10/17 1535    Living Arrangements  other (see comments) (duplex) 07/09/17 1319   ABUSE RISK SCREEN      COPING/STRESS     QUESTION TO PATIENT:  Has a member of your family or a partner(now or in the past) intimidated, hurt, manipulated, or controlled you in any way?  no 07/02/17 2150    Major Change/Loss/Stressor  illness 07/04/17 0717   QUESTION TO PATIENT: Do you feel safe going back to the place where you are living?  yes 07/02/17 2150    EXPECTED DISCHARGE     OBSERVATION: Is there reason to believe there has been maltreatment of a vulnerable adult (ie. Physical/Sexual/Emotional abuse, self neglect, lack of adequate food, shelter, medical care, or financial exploitation)?  no 07/02/17 2150    Expected Discharge Date  07/12/17 (TCU) 07/11/17 0943   (R) MENTAL HEALTH SUICIDE RISK      DISCHARGE PLANNING     Willingness to Contact Staff Member if Feeling Like Hurting Self  yes 07/04/17 1510    Transportation Available  car 07/09/17 1319   Are you depressed or being treated for depression?  Yes 07/04/17 1510    FINAL RESOURCES     HOMICIDE RISK      Equipment Currently Used at Home  none 07/09/17 1319   Homicidal Ideation  no 07/02/17 2150

## 2017-07-02 NOTE — IP AVS SNAPSHOT
Kaylie Coronacy CHARLEE #[id number] (CSN: [id number])  (62 year old F)  (Adm: 17)     [id number]               [address] [place] [address]: [phone]            Patient Demographics     Patient Name Sex          Age SSN Address Phone    Hafsa Corona Female 1955 (62 year old) [id number]  [place] [address] [phone] (Home)  [phone] (Mobile)      Emergency Contact(s)     Name Relation Home Work Mobile    FORTINO MORROW Friend [phone]    Sue Sister [phone]      Christine Sister [phone]        Admission Information     Attending Provider Admitting Provider Admission Type Admission Date/Time    Serena Cole MD Teoh, Deanna Gek Koon, MD Emergency 17  2155    Discharge Date Hospital Service Auth/Cert Status Service Area     Gyn/Onc Incomplete [facility]    Unit Room/Bed Admission Status       [facility] [id number] Admission (Confirmed)       Admission     Complaint    Pelvic mass in female, Pelvic Mass , S/P laparotomy      Hospital Account     Name Acct ID Class Status Primary Coverage    CoronaHafsa CHARLEE [id number] Inpatient Open [employer] OPEN ACCESS FULLY INSURED            Guarantor Account (for Hospital Account #[id number])     Name Relation to Pt Service Area Active? Acct Type    Hafsa Corona  FCS Yes Personal/Family    Address Phone          [address]  [place] [address] [phone](H)              Coverage Information (for Hospital Account #[id number])     F/O Payor/Plan Precert #    [employer] OPEN ACCESS FULLY INSURED     Subscriber Subscriber #    Cj Hafsa DESHPANDE [id number]    Address Phone    [address]  [place] [address] [phone]                                                INTERAGENCY TRANSFER FORM - PHYSICIAN ORDERS   2017                       [address] [place] [address]: [phone]            Attending Provider: Serena Cole MD     Allergies:   "Sulfa Drugs, Wasps [Hornets]    Infection:  None   Service:  GYN/ONC    Ht:  1.613 m (5' 3.5\")   Wt:  101.5 kg (223 lb 11.2 oz)   Admission Wt:  95.6 kg (210 lb 12.8 oz)    BMI:  39.01 kg/m 2   BSA:  2.13 m 2            ED Clinical Impression     Diagnosis Description Comment Added By Time Added    Cellulitis and abscess of leg [L03.119, L02.419] Cellulitis and abscess of leg [L03.119, L02.419]  Thelma Lopez MD 7/3/2017 12:18 AM    Pelvic mass [R19.00] Pelvic mass [R19.00]  Thelma Lopez MD 7/3/2017 12:18 AM      Hospital Problems as of 7/12/2017              Priority Class Noted POA    Pelvic mass in female Medium  7/3/2017 Yes    S/P laparotomy Medium  7/7/2017 Yes      Non-Hospital Problems as of 7/12/2017              Priority Class Noted    Attention deficit disorder   7/19/2005    PANIC DISORDER    7/19/2005    VASOMOTOR RHINITIS   7/19/2005    Chronic Maxillary Sinusitis   11/21/2008    Tachycardia   Unknown    Type 2 diabetes mellitus without complication (H)   10/31/2010    HYPERLIPIDEMIA LDL GOAL <100   10/31/2010    Allergic rhinitis due to other allergen   12/20/2011    BMI > 35 Medium  10/14/2015    Essential hypertension Medium  2/9/2016    Lumbago Medium  4/4/2016    Major depressive disorder, recurrent episode, mild (H) Medium  9/6/2016    Long-term (current) use of anticoagulants [Z79.01] Medium  6/6/2017    Deep vein thrombosis (DVT) (H) [I82.409] Medium  6/6/2017      Code Status History     Date Active Date Inactive Code Status Order ID Comments User Context    7/11/2017  9:01 AM  Full Code [id number]  Concepcion Kohler APRN CNP Outpatient    7/7/2017  8:46 PM 7/11/2017  9:01 AM Full Code [id number]  Danielle Barrios MD Inpatient    7/3/2017  3:25 AM 7/7/2017  8:46 PM Full Code [id number]  Yolande Shankar MD Inpatient      Current Code Status     Date Active Code Status Order ID Comments User Context       Prior      Summary of Visit     Reason for your hospital stay       " Surgery                Medication Review      START taking        Dose / Directions Comments    acetaminophen 325 MG tablet   Commonly known as:  TYLENOL   Used for:  Cellulitis and abscess of leg, Mass of pelvis        Dose:  650 mg   Take 2 tablets (650 mg) by mouth every 6 hours as needed for mild pain   Quantity:  100 tablet   Refills:  0        HYDROmorphone 4 MG tablet   Commonly known as:  DILAUDID   Used for:  Mass of pelvis, Cellulitis and abscess of leg        Dose:  4-6 mg   Take 1-1.5 tablets (4-6 mg) by mouth every 3 hours as needed for moderate to severe pain   Quantity:  50 tablet   Refills:  0        polyethylene glycol Packet   Commonly known as:  MIRALAX/GLYCOLAX   Used for:  Mass of pelvis        Dose:  17 g   Take 17 g by mouth daily   Quantity:  7 packet   Refills:  0        senna-docusate 8.6-50 MG per tablet   Commonly known as:  SENOKOT-S;PERICOLACE   Used for:  Mass of pelvis        Dose:  2 tablet   Take 2 tablets by mouth 2 times daily Start with 1 tablet PO BID, If no bowel movement in 24 hours, increase to 2 tablets PO BID.  Hold for loose stools.   Quantity:  100 tablet   Refills:  0          CONTINUE these medications which have NOT CHANGED        Dose / Directions Comments    albuterol 108 (90 BASE) MCG/ACT Inhaler   Commonly known as:  albuterol   Used for:  Cough        Dose:  2 puff   Inhale 2 puffs into the lungs 4 times daily as needed for shortness of breath / dyspnea   Quantity:  1 Inhaler   Refills:  PRN        atorvastatin 80 MG tablet   Commonly known as:  LIPITOR   Used for:  Hyperlipidemia LDL goal <100        Dose:  80 mg   Take 1 tablet (80 mg) by mouth daily   Quantity:  90 tablet   Refills:  PRN        CALCIUM-MAGNESIUM-VITAMIN D PO        Dose:  2 tablet   Take 2 tablets by mouth daily   Refills:  0        DIGESTIVE ENZYME PO        Dose:  1 capsule   Take 1 capsule by mouth as needed   Refills:  0        EPINEPHrine 0.3 MG/0.3ML injection   Used for:  Bee allergy  status        Dose:  0.3 mg   Inject 0.3 mLs (0.3 mg) into the muscle once as needed for anaphylaxis   Quantity:  1 each   Refills:  PRN        escitalopram 20 MG tablet   Commonly known as:  LEXAPRO   Used for:  Major depressive disorder, recurrent episode, mild (H)        Dose:  20 mg   Take 1 tablet (20 mg) by mouth daily   Quantity:  30 tablet   Refills:  PRN        fluticasone 50 MCG/ACT spray   Commonly known as:  FLONASE   Used for:  Chronic maxillary sinusitis        Dose:  2 spray   Spray 2 sprays into both nostrils daily   Quantity:  48 g   Refills:  1        lisinopril 10 MG tablet   Commonly known as:  PRINIVIL/ZESTRIL   Used for:  Essential hypertension        Dose:  10 mg   Take 1 tablet (10 mg) by mouth daily   Quantity:  90 tablet   Refills:  PRN    Keep on file       metFORMIN 500 MG 24 hr tablet   Commonly known as:  GLUCOPHAGE-XR   Used for:  Type 2 diabetes mellitus without complication, without long-term current use of insulin (H)        Take two tabs by mouth once daily with evening meal.   Quantity:  180 tablet   Refills:  PRN    Keep on file       MULTIVITAMIN ADULT PO        Dose:  1 tablet   Take 1 tablet by mouth daily   Refills:  0        order for DME   Used for:  Essential hypertension        Equipment being ordered: blood pressure monitor   Quantity:  1 Units   Refills:  0        order for DME   Used for:  Long-term (current) use of anticoagulants, Acute deep vein thrombosis (DVT) of left lower extremity, unspecified vein (H)        Compression stockings 20-30 mm Hg. To be wear when directed by Hematology team and while awake for the first two years post clot.   Quantity:  1 each   Refills:  0        PRO-BIOTIC BLEND PO        Dose:  1 capsule   Take 1 capsule by mouth daily Enzymatic Therapy-probiotic pearls   Refills:  0        rivaroxaban ANTICOAGULANT 20 MG Tabs tablet   Commonly known as:  XARELTO   Used for:  Long-term (current) use of anticoagulants, Acute deep vein thrombosis  "(DVT) of left lower extremity, unspecified vein (H)        Dose:  20 mg   Take 1 tablet (20 mg) by mouth daily (with dinner)   Quantity:  30 tablet   Refills:  3        vitamin B complex with vitamin C Tabs tablet        Dose:  1 tablet   Take 1 tablet by mouth daily   Refills:  0        vitamin D 1000 UNITS capsule        Dose:  2000 Units   Take 2,000 Units by mouth daily   Refills:  0        VYVANSE PO        Dose:  50 mg   Take 50 mg by mouth daily   Refills:  0          STOP taking     traMADol 50 MG tablet   Commonly known as:  ULTRAM                   After Care     Activity - Ambulate in hallway       Every shift       Activity - Up with nursing assistance           Additional Discharge Instructions       Elevate LE       Advance Diet as Tolerated       Follow this diet upon discharge: Regular diabetic diet       Encourage PO fluids           Fall precautions           General info for SNF       Length of Stay Estimate: Short Term Care: Estimated # of Days <30  Condition at Discharge: Stable  Level of care:skilled   Rehabilitation Potential: Good  Admission H&P remains valid and up-to-date: Yes  Recent Chemotherapy: N/A  Use Nursing Home Standing Orders: No       Glucose monitor nursing POCT       Before meals and at bedtime       Intake and output       Every shift       Mantoux instructions       Give two-step Mantoux (PPD) Per Facility Policy Yes       Wound care (specify)       Site:   Abd midline inc  Instructions:  Keep c/d/i. Abd binder to be worn when oob. Monitor for signs of infection. Ok to shower and pat dry. Keep Open to air    Left thigh wound care instructions: Change dressing daily, clean with Microklenz, pack wound with 1/4\" Iodoform gauze, cover with Mepilex dressing             Procedures     Oxygen - Nasal cannula       1-2 Lpm by nasal cannula to keep O2 sats 92% or greater.             Blood Bank Orders     ABO/Rh Type and Screen                 Referrals     Occupational Therapy Adult " "Consult       Evaluate and treat as clinically indicated.    Reason:  Weakness post op       Physical Therapy Adult Consult       Evaluate and treat as clinically indicated.    Reason:  Weakness post op             Follow-Up Appointment Instructions     Follow Up and recommended labs and tests       Follow up 7/31/17 Dr Shaffer - call prior to then for questions or concerns [phone].    Left thigh wound care instructions: Change dressing daily, clean with Microklenz, pack wound with 1/4\" Iodoform gauze, cover with Mepilex dressing    Left leg DVT: Continue taking Xarelto as prescribed by your hematologist. Follow up with your hematologist as previously scheduled for further management of your DVT.     GENERAL POST-OPERATIVE  PATIENT INSTRUCTIONS      FOLLOW-UP:    Call Surgeon if you have:  Temperature greater than 100.4  Persistent nausea and vomiting  Severe uncontrolled pain  Redness, tenderness, or signs of infection (pain, swelling, redness, odor or green/yellow discharge around the site)  Difficulty breathing, headache or visual disturbances  Hives  Persistent dizziness or light-headedness  Extreme fatigue  Any other questions or concerns you may have after discharge    In an emergency, call 911 or go to an Emergency Department at a nearby hospital       WOUND CARE INSTRUCTIONS:  Keep a dry clean dressing on the wound if there is drainage. If you had a bandage initially, it may be removed after 24 hours.  Once the wound has quit draining you may leave it open to air.  If clothing rubs against the wound or causes irritation and the wound is not draining you may cover it with a dry dressing during the daytime.  Try to keep the wound dry and avoid ointments on the wound unless directed to do so.  If the wound becomes bright red and painful or starts to drain infected material that is not clear, please contact your physician immediately.    1.  You may shower 24 hrs after surgery   2.  No soaking in the tub " for 4 weeks       DIET:  There are no dietary restrictions.  You may eat any foods that you can tolerate unless instructed otherwise.  It is a good idea to eat a high fiber diet and take in plenty of fluids to prevent constipation.  If you become constipated, please follow the instructions below.    ACTIVITY:  You are encouraged to cough, deep breath and use your incentive spirometer if you were given one, every 15-30 minutes when awake.  This will help prevent respiratory complications and low grade fevers post-operatively.  You may want to hug a pillow when coughing and sneezing to add additional support to the surgical area, if you had abdominal surgery, which will decrease pain during these times.      1.  No heavy lifting >20lbs or strenuous exercise for six-eight weeks.  No exercise in which you are using core muscles (yoga, pilates, swimming, weight lifting)  2.  You may walk as much as you wish.  You are encouraged to increase your activity each day after surgery.  Stairs are okay.   3.  Nothing per vagina for eight weeks.  No tampons, no intercourse, no douching.  You can expect some light vaginal spotting and discharge for up to six weeks.  If bleeding becomes heavy, please contact the office.     MEDICATIONS:  Try to take narcotic medications and anti-inflammatory medications, such as tylenol, ibuprofen, naprosyn, etc., with food.  This will minimize stomach upset from the medication.  Should you develop nausea and vomiting from the pain medication, or develop a rash, please discontinue the medication and contact your physician.  You should not drive, make important decisions, or operate machinery when taking narcotic pain medication.    OTHER:  Patients are often constipated after general anesthesia and surgery.  The patient should continue to take stool softeners (for example, Senokot-S) for the next six weeks (unless diarrhea develops) and consume adequate amounts of water.  If the patient remains  constipated or unable to pass stool, please try one or all of the following measures:  1.  Milk of Magnesia 30cc twice a day as needed by mouth  2.  Metamucil 2 tablespoons in 12 ounces of fluid  3.  Dulcolax oral or suppositories  4.  Prunes or prune juice  5.  Miralax daily      QUESTIONS:  Please feel free to call your physician or the hospital  if you have any questions, and they will be glad to assist you.             Your next 10 appointments already scheduled     Jul 31, 2017  3:00 PM CDT   (Arrive by 2:45 PM)   Return Visit with Jarod Shaffer MD   [facility] ([facility])    [address] [place] [address] [phone]            Sep 15, 2017 12:15 PM CDT   LAB with  LAB   [facility] ([facility])    [address] [place] [address] [phone]           Patient must bring picture ID.  Patient should be prepared to give a urine specimen  Please do not eat 10-12 hours before your appointment if you are coming in fasting for labs on lipids, cholesterol, or glucose (sugar).  Pregnant women should follow their Care Team instructions. Water with medications is okay. Do not drink coffee or other fluids.   If you have concerns about taking  your medications, please ask at office or if scheduling via [email or web address], send a message by clicking on Secure Messaging, Message Your Care Team.            Sep 15, 2017 12:30 PM CDT   US LOWER EXTREMITY VENOUS DUPLEX LEFT with UCUSV1   [facility] ([facility])    [address] [place] [id number] [phone]           Please bring a list of your medicines (including vitamins, minerals and over-the-counter drugs). Also, tell your doctor about any allergies you may have. Wear comfortable clothes and leave your valuables at home.  You do not need to do anything special to  "prepare for your exam.  Please call the Imaging Department at your exam site with any questions.            Sep 15, 2017  1:30 PM CDT   (Arrive by 1:15 PM)   RETURN CLOTTING DISORDER with William Villarreal MD   [facility] ([facility])    [address] [facility]  [address] [facility] [id number] [phone]              Statement of Approval     Ordered          07/12/17 1124  I have reviewed and agree with all the recommendations and orders detailed in this document.  EFFECTIVE NOW     Approved and electronically signed by:  Corry Segura MD                                                 INTERAGENCY TRANSFER FORM - NURSING   7/2/2017                       [address] [facility] BANK: [phone]            Attending Provider: Serena Cole MD     Allergies:  Sulfa Drugs, Wasps [Hornets]    Infection:  None   Service:  GYN/ONC    Ht:  1.613 m (5' 3.5\")   Wt:  101.5 kg (223 lb 11.2 oz)   Admission Wt:  95.6 kg (210 lb 12.8 oz)    BMI:  39.01 kg/m 2   BSA:  2.13 m 2            Advance Directives        Does patient have a scanned Advance Directive/ACP document in EPIC?           No        Immunizations     Name Date      Influenza (IIV3) 01/10/13     Influenza (IIV3) 09/29/09     Influenza (IIV3) 11/21/08     Influenza (IIV3) 10/11/07     Influenza (IIV3) 11/09/06     Influenza Vaccine IM 3yrs+ 4 Valent IIV4 09/06/16     Influenza Vaccine IM 3yrs+ 4 Valent IIV4 02/04/16     Influenza Vaccine IM 3yrs+ 4 Valent IIV4 11/19/13     Influenza Vaccine, 3 YRS +, IM (QUADRIVALENT W/PRESERVATIVES) 11/21/14     Pneumococcal 23 valent 11/19/13     TDAP Vaccine (Adacel) 02/04/16       ASSESSMENT     Discharge Profile Flowsheet     EXPECTED DISCHARGE     COMMUNICATION ASSESSMENT      Expected Discharge Date  07/12/17 (TCU) 07/11/17 0943   Patient's communication style  spoken language (English or Bilingual) 07/02/17 9681    DISCHARGE NEEDS ASSESSMENT     SKIN      Equipment " "Currently Used at Home  none 07/09/17 1319   Inspection  Full 07/12/17 0507    Transportation Available  car 07/09/17 1319   Skin WDL  ex 07/12/17 0507    GASTROINTESTINAL (ADULT,PEDIATRIC,OB)     Skin Integrity  bruise(s);drain/device(s);incision(s) 07/12/17 0507    GI WDL  ex 07/12/17 0507   Skin areas NOT inspected  -- (mesh panties) 07/07/17 1812    Abdominal Appearance  obese 07/12/17 0356   Skin Temperature  warm 07/12/17 0507    All Quadrants Bowel Sounds  audible and active in all quadrants 07/12/17 0507   Skin Moisture  dry;flaky 07/12/17 0356    All Quadrants Palpation  soft/nontender 07/03/17 0021   SAFETY      Last Bowel Movement  07/11/17 07/12/17 0507   Safety WDL  WDL 07/12/17 0507    GI Signs/Symptoms  abdominal fullness;abdominal pain 07/12/17 0507   Safety Factors  ID band on;upper side rails raised x 2;call light in reach;bed in low position;wheels locked 07/12/17 0507    Passing flatus  no 07/12/17 0507                      Assessment WDL (Within Defined Limits) Definitions           Safety WDL     Effective: 09/28/15    Row Information: <b>WDL Definition:</b> Bed in low position, wheels locked; call light in reach; upper side rails up x 2; ID band on<br> <font color=\"gray\"><i>Item=AS safety wdl>>List=AS safety wdl>>Version=F14</i></font>      Skin WDL     Effective: 09/28/15    Row Information: <b>WDL Definition:</b> Warm; dry; intact; elastic; without discoloration; pressure points without redness<br> <font color=\"gray\"><i>Item=AS skin wdl>>List=AS skin wdl>>Version=F14</i></font>      Vitals     Vital Signs Flowsheet     VITAL SIGNS     Pain Control  partially effective 07/12/17 0447    Temp  98.1  F (36.7  C) 07/12/17 1154   Functioning  can do most things, but pain gets in the way of some 07/12/17 0447    Temp src  Oral 07/12/17 1154   Sleep  normal sleep 07/12/17 0632    Resp  17 07/12/17 1154   ANALGESIA SIDE EFFECTS MONITORING      Pulse  96 07/11/17 1015   Side Effects Monitoring: " "Respiratory Quality  R 07/12/17 0632    Heart Rate  101 07/12/17 1154   Side Effects Monitoring: Respiratory Depth  N 07/12/17 0632    Pulse/Heart Rate Source  Monitor 07/12/17 1154   Side Effects Monitoring: Sedation Level  S 07/12/17 0632    BP  116/68 07/12/17 1154   HEIGHT AND WEIGHT      BP Location  Right arm 07/12/17 1154   Height  1.613 m (5' 3.5\") 07/03/17 0336    OXYGEN THERAPY     Height Method  Stated 07/03/17 0336    SpO2  93 % 07/12/17 1154   Weight  101.5 kg (223 lb 11.2 oz) 07/12/17 1139    O2 Device  None (Room air) 07/12/17 1154   Weight Method  Standing scale 07/12/17 1139    Oxygen Delivery  1 LPM 07/12/17 0614   BSA (Calculated - sq m)  2.03 07/03/17 0336    RESPIRATORY MONITORING     BMI (Calculated)  35.61 07/03/17 0336    Respiratory Monitoring (EtCO2)  38 mmHg (dc'ing Capno now) 07/09/17 1055   POSITIONING      Integrated Pulmonary Index (IPI)  8-9 07/09/17 1055   Body Position  supine, head elevated;supine, legs elevated 07/12/17 0507    PAIN/COMFORT     Head of Bed (HOB)  HOB at 30 degrees 07/12/17 0507    Patient Currently in Pain  sleeping: patient not able to self report 07/12/17 0632   Positioning/Transfer Devices  pillows;in use 07/10/17 0510    Preferred Pain Scale  CAPA (Clinically Aligned Pain Assessment) ([facility], [facility] and [facility] Adults Only) 07/12/17 0447   Chair  Upright in chair 07/11/17 0904    0-10 Pain Scale  2 07/03/17 0332   DAILY CARE      Pain Location  Abdomen 07/12/17 0447   Activity Type  ambulated to bathroom 07/12/17 1017    Pain Orientation  Lower 07/12/17 0447   Activity Level of Assistance  assistance, stand-by 07/12/17 1017    Pain Descriptors  Cramping;Discomfort 07/12/17 0447   POINT OF CARE TESTING      Pain Management Interventions  analgesia administered 07/12/17 0447   Puncture Site  fingertip 07/05/17 1326    Pain Intervention(s)  Medication (See eMAR) 07/12/17 0447   Bedside Glucose (mg/dl )   109 mg/dl 07/05/17 1326    Response to Interventions  " Absence of nonverbal indicators of pain 07/12/17 0632   ECG      CLINICALLY ALIGNED PAIN ASSESSMENT (CAPA) ([facility], [facility] ADULTS ONLY)     ECG Rhythm  Sinus tachycardia 07/07/17 2021    Comfort  tolerable with discomfort 07/12/17 0447   Ectopy  None 07/07/17 2021    Change in Pain  about the same 07/12/17 0447                 Patient Lines/Drains/Airways Status    Active LINES/DRAINS/AIRWAYS     Name: Placement date: Placement time: Site: Days: Last dressing change:    Wound with Packing 07/03/17 Left;Upper Thigh Other (comment) Dog bite 07/03/17   0500   Thigh   9     Incision/Surgical Site 07/07/17 Abdomen 07/07/17   1722    4             Patient Lines/Drains/Airways Status    Active PICC/CVC     None            Intake/Output Detail Report     Date Intake         Output     Net    Shift P.O. I.V. IV Piggyback Colloid Blood Components Total Urine Emesis/NG output Blood Total       Day 07/11/17 0000 - 07/11/17 0659 -- -- -- -- -- -- 625 -- -- 625 -625    Etelvina 07/11/17 0700 - 07/11/17 1459 480 -- -- -- -- 480 400 300 -- 700 -220    Noc 07/11/17 1500 - 07/11/17 2359 400 -- -- -- -- 400 325 -- -- 325 75    Day 07/12/17 0000 - 07/12/17 0659 120 -- -- -- -- 120 250 -- -- 250 -130    Etelvina 07/12/17 0700 - 07/12/17 1459 -- -- -- -- -- -- 175 -- -- 175 -175      Last Void/BM       Most Recent Value    Urine Occurrence 1 at 07/10/2017 1724    Stool Occurrence 1 at 07/11/2017 0825      Case Management/Discharge Planning     Case Management/Discharge Planning Flowsheet     LIVING ENVIRONMENT     MH/BH CAREGIVER      Lives With  alone 07/09/17 1319   Filed Complexity Screen Score  7 07/10/17 1535    Living Arrangements  other (see comments) (duplex) 07/09/17 1319   ABUSE RISK SCREEN      COPING/STRESS     QUESTION TO PATIENT:  Has a member of your family or a partner(now or in the past) intimidated, hurt, manipulated, or controlled you in any way?  no 07/02/17 2150    Major Change/Loss/Stressor  illness 07/04/17 0701    QUESTION TO PATIENT: Do you feel safe going back to the place where you are living?  yes 07/02/17 2150    EXPECTED DISCHARGE     OBSERVATION: Is there reason to believe there has been maltreatment of a vulnerable adult (ie. Physical/Sexual/Emotional abuse, self neglect, lack of adequate food, shelter, medical care, or financial exploitation)?  no 07/02/17 2150    Expected Discharge Date  07/12/17 (TCU) 07/11/17 0943   (R) MENTAL HEALTH SUICIDE RISK      DISCHARGE PLANNING     Willingness to Contact Staff Member if Feeling Like Hurting Self  yes 07/04/17 1510    Transportation Available  car 07/09/17 1319   Are you depressed or being treated for depression?  Yes 07/04/17 1510    FINAL RESOURCES     HOMICIDE RISK      Equipment Currently Used at Home  none 07/09/17 1319   Homicidal Ideation  no 07/02/17 2150                  UNIT 7C [facility] BANK: [phone]            Medication Administration Report for Hafsa Corona as of 07/12/17 1208   Legend:    Given Hold Not Given Due Canceled Entry Other Actions    Time Time (Time) Time  Time-Action       Inactive    Active    Linked        Medications 07/06/17 07/07/17 07/08/17 07/09/17 07/10/17 07/11/17 07/12/17    acetaminophen (TYLENOL) tablet 650 mg  Dose: 650 mg Freq: EVERY 6 HOURS Route: PO  Start: 07/07/17 2100   Admin Instructions: Do NOT use if patient has an active opioid/acetaminophen analgesic order for pain  Maximum acetaminophen dose from all sources = 75 mg/kg/day not to exceed 4 grams/day.      2100 (650 mg)-Given        0234 (650 mg)-Given       0840 (650 mg)-Given       1439 (650 mg)-Given       2147 (650 mg)-Given        0420 (650 mg)-Given       1045 (650 mg)-Given       1629 (650 mg)-Given       2113 (650 mg)-Given        0459 (650 mg)-Given       1024 (650 mg)-Given       1725 (650 mg)-Given        0029 (650 mg)-Given       0611 (650 mg)-Given       (1319)-Not Given       1609 (650 mg)-Given       2150 (650 mg)-Given        0442 (650 mg)-Given        0915 (650 mg)-Given       [ ] 1600       [ ] 2200           albuterol (PROAIR HFA/PROVENTIL HFA/VENTOLIN HFA) Inhaler 2 puff  Dose: 2 puff Freq: 4 TIMES DAILY PRN Route: IN  PRN Reason: shortness of breath / dyspnea  Start: 07/03/17 0325     1056-Auto Hold       1505 (6 puff)-Given       1744 (6 puff)-Given       2038-Unhold                atorvastatin (LIPITOR) tablet 80 mg  Dose: 80 mg Freq: DAILY Route: PO  Start: 07/03/17 0800    1901 (80 mg)-Given        1056-Auto Hold       2000-Automatically Held       2038-Unhold       2325 (80 mg)-Given        2009 (80 mg)-Given        2104 (80 mg)-Given        2035 (80 mg)-Given        1936 (80 mg)-Given        [ ] 2000           benzocaine-menthol (CHLORASEPTIC) 6-10 MG lozenge 1-2 lozenge  Dose: 1-2 lozenge Freq: EVERY 1 HOUR PRN Route: BU  PRN Reason: sore throat  PRN Comment: without fever  Start: 07/07/17 2046              bisacodyl (DULCOLAX) Suppository 10 mg  Dose: 10 mg Freq: DAILY PRN Route: RE  PRN Reason: constipation  Start: 07/11/17 0047         0115 (10 mg)-Given            cholecalciferol (vitamin D) tablet 2,000 Units  Dose: 2,000 Units Freq: DAILY Route: PO  Start: 07/03/17 0800    0916 (2,000 Units)-Given        (0840)-Not Given       1056-Auto Hold       2038-Unhold        1041 (2,000 Units)-Given        1046 (2,000 Units)-Given        0811 (2,000 Units)-Given        0842 (2,000 Units)-Given        1026 (2,000 Units)-Given           escitalopram (LEXAPRO) tablet 20 mg  Dose: 20 mg Freq: DAILY Route: PO  Start: 07/03/17 0800    0917 (20 mg)-Given        0837 (20 mg)-Given       1056-Auto Hold       2038-Unhold        0838 (20 mg)-Given        0832 (20 mg)-Given        0811 (20 mg)-Given        0841 (20 mg)-Given        1022 (20 mg)-Given           fluticasone (FLONASE) 50 MCG/ACT spray 2 spray  Dose: 2 spray Freq: DAILY Route: BOTH NOSTRIL  Start: 07/03/17 0800    0917 (2 spray)-Given        0839 (2 spray)-Given       1056-Auto Hold        2038-Unhold        0846 (2 spray)-Given        0824 (2 spray)-Given        0811 (2 spray)-Given        0840 (2 spray)-Given        0910 (2 spray)-Given           glucose 40 % gel 15-30 g  Dose: 15-30 g Freq: EVERY 15 MIN PRN Route: PO  PRN Reason: low blood sugar  Start: 07/03/17 0325   Admin Instructions: Give 15 g for BG 51 to 69 mg/dL IF patient is conscious and able to swallow. Give 30 g for BG less than or equal to 50 mg/dL IF patient is conscious and able to swallow. Do NOT give glucose gel via enteral tube.  IF patient has enteral tube: give apple juice 120 mL (4 oz or 15 g of CHO) via enteral tube for BG 51 to 69 mg/dL.  Give apple juice 240 mL (8 oz or 30 g of CHO) via enteral tube for BG less than or equal to 50 mg/dL.    ~Oral gel is preferable for conscious and able to swallow patient.   ~IF gel unavailable or patient refuses may provide apple juice 120 mL (4 oz or 15 g of CHO). Document juice on I and O flowsheet.      1056-Auto Hold       2038-Unhold               Or  dextrose 50 % injection 25-50 mL  Dose: 25-50 mL Freq: EVERY 15 MIN PRN Route: IV  PRN Reason: low blood sugar  Start: 07/03/17 0325   Admin Instructions: Use if have IV access, BG less than 70 mg/dL and meet dose criteria below:  Dose if conscious and alert (or disorientated) and NPO = 25 mL  Dose if unconscious / not alert = 50 mL  Vesicant.      1056-Auto Hold       2038-Unhold               Or  glucagon injection 1 mg  Dose: 1 mg Freq: EVERY 15 MIN PRN Route: SC  PRN Reason: low blood sugar  PRN Comment: May repeat x 1 only  Start: 07/03/17 0325   Admin Instructions: May give SQ or IM. ONLY use glucagon IF patient has NO IV access AND is UNABLE to swallow AND blood glucose is LESS than or EQUAL to 50 mg/dL.      1056-Auto Hold       2038-Unhold                HYDROmorphone (DILAUDID) tablet 4-6 mg  Dose: 4-6 mg Freq: EVERY 3 HOURS PRN Route: PO  PRN Reason: moderate to severe pain  Start: 07/11/17 0800         0938 (4 mg)-Given        1541 (2 mg)-Given       1608 (2 mg)-Given       1854 (2 mg)-Given [C]       2150 (2 mg)-Given [C]       2242 (2 mg)-Given        0130 (4 mg)-Given       0441 (4 mg)-Given       0915 (4 mg)-Given           insulin aspart (NovoLOG) inj (RAPID ACTING)  Dose: 1-5 Units Freq: AT BEDTIME Route: SC  Start: 07/03/17 0330   Admin Instructions: MEDIUM INSULIN RESISTANCE DOSING    Do Not give Bedtime Correction Insulin if BG less than  200.   For  - 249 give 1 units.   For  - 299 give 2 units.   For  - 349 give 3 units.   For  -399 give 4 units.   For BG greater than or equal to 400 give 5 units.  Notify provider if glucose greater than or equal to 350 mg/dL after administration of correction dose.  If given at mealtime, must be administered 5 min before meal or immediately after.     (2231)-Not Given [C]        1056-Auto Hold       2038-Unhold       (2232)-Not Given        (2153)-Not Given        (2106)-Not Given        (2251)-Not Given        (2322)-Not Given [C]        [ ] 2200           insulin aspart (NovoLOG) inj (RAPID ACTING)  Dose: 1-7 Units Freq: 3 TIMES DAILY BEFORE MEALS Route: SC  Start: 07/03/17 0730   Admin Instructions: Correction Scale - MEDIUM INSULIN RESISTANCE DOSING     Do Not give Correction Insulin if Pre-Meal BG less than 140.   For Pre-Meal  - 189 give 1 unit.   For Pre-Meal  - 239 give 2 units.   For Pre-Meal  - 289 give 3 units.   For Pre-Meal  - 339 give 4 units.   For Pre-Meal - 399 give 5 units.   For Pre-Meal -449 give 6 units  For Pre-Meal BG greater than or equal to 450 give 7 units.   To be given with prandial insulin, and based on pre-meal blood glucose.    Notify provider if glucose greater than or equal to 350 mg/dL after administration of correction dose.  If given at mealtime, must be administered 5 min before meal or immediately after.     (0916)-Not Given [C]       (1132)-Not Given [C]       (1830)-Not Given [C]         "(0838)-Not Given [C]       1056-Auto Hold       1200-Automatically Held       1700-Automatically Held       2038-Unhold        (0808)-Not Given       1228 (1 Units)-Given [C]       1741 (1 Units)-Given        0822 (1 Units)-Given       (1209)-Not Given       (1700)-Not Given        (0818)-Not Given [C]       (1253)-Not Given [C]       (1716)-Not Given [C]        0835 (1 Units)-Given [C]       1318 (1 Units)-Given       (1925)-Not Given [C]        (0909)-Not Given       (1154)-Not Given       [ ] 1700           lidocaine (LMX4) kit  Freq: EVERY 1 HOUR PRN Route: Top  PRN Reason: pain  PRN Comment: with VAD insertion or accessing implanted port.  Start: 07/07/17 2046   Admin Instructions: Do NOT give if patient has a history of allergy to any local anesthetic or any \"ti\" product.   Apply 30 minutes prior to VAD insertion or port access.  MAX Dose:  2.5 g (  of 5 g tube)               lidocaine 1 % 1 mL  Dose: 1 mL Freq: EVERY 1 HOUR PRN Route: OTHER  PRN Comment: mild pain with VAD insertion or accessing implanted port  Start: 07/07/17 2046   Admin Instructions: Do NOT give if patient has a history of allergy to any local anesthetic or any \"ti\" product. MAX dose 1 mL subcutaneous OR intradermal in divided doses.               magnesium hydroxide (MILK OF MAGNESIA) suspension 30 mL  Dose: 30 mL Freq: DAILY PRN Route: PO  PRN Reason: constipation  Start: 07/11/17 0045   Admin Instructions: Shake well.          0114 (30 mL)-Given            multivitamin, therapeutic with minerals (THERA-VIT-M) tablet 1 tablet  Dose: 1 tablet Freq: DAILY Route: PO  Start: 07/04/17 0800    0916 (1 tablet)-Given        (0839)-Not Given       1056-Auto Hold       2038-Unhold        1040 (1 tablet)-Given [C]        1047 (1 tablet)-Given       1048 (1 tablet)-Given        0810 (1 tablet)-Given        0841 (1 tablet)-Given        1025 (1 tablet)-Given           naloxone (NARCAN) injection 0.1-0.4 mg  Dose: 0.1-0.4 mg Freq: EVERY 2 MIN PRN " Route: IV  PRN Reason: opioid reversal  Start: 07/11/17 0809   Admin Instructions: For respiratory rate LESS than or EQUAL to 8.  Partial reversal dose:  0.1 mg titrated q 2 minutes for Analgesia Side Effects Monitoring Sedation Level of 3 (frequently drowsy, arousable, drifts to sleep during conversation).Full reversal dose:  0.4 mg bolus for Analgesia Side Effects Monitoring Sedation Level of 4 (somnolent, minimal or no response to stimulation).               ondansetron (ZOFRAN-ODT) ODT tab 4 mg  Dose: 4 mg Freq: EVERY 6 HOURS PRN Route: PO  PRN Reasons: nausea,vomiting  Start: 07/11/17 1312   Admin Instructions: With dry hands, peel back foil backing and gently remove tablet; do not push oral disintegrating tablet through foil backing; administer immediately on tongue and oral disintegrating tablet dissolves in seconds; then swallow with saliva; liquid not required.          1317 (4 mg)-Given       2150 (4 mg)-Given            polyethylene glycol (MIRALAX/GLYCOLAX) Packet 17 g  Dose: 17 g Freq: 2 TIMES DAILY Route: PO  Start: 07/06/17 0930   Admin Instructions: 1 Packet = 17 grams. Mixed prescribed dose in 8 ounces of water. Follow with 8 oz. of water.     1128 (17 g)-Given       2101 (17 g)-Given        (0839)-Not Given       1056-Auto Hold       2000-Automatically Held       2038-Unhold        (0845)-Not Given       1044 (17 g)-Given       2009 (17 g)-Given        1048 (17 g)-Given       2105 (17 g)-Given        0809 (17 g)-Given       2034 (17 g)-Given        0840 (17 g)-Given       1936 (17 g)-Given        (0917)-Not Given [C]       [ ] 2000           rivaroxaban ANTICOAGULANT (XARELTO) tablet 20 mg  Dose: 20 mg Freq: DAILY WITH SUPPER Route: PO  Start: 07/09/17 2200       2113 (20 mg)-Given        2035 (20 mg)-Given        1927 (20 mg)-Given        [ ] 1700           senna-docusate (SENOKOT-S;PERICOLACE) 8.6-50 MG per tablet 2 tablet  Dose: 2 tablet Freq: 2 TIMES DAILY Route: PO  Start: 07/06/17 2000    Admin Instructions: Start with 1 tablet PO BID, If no bowel movement in 24 hours, increase to 2 tablets PO BID.  Hold for loose stools.     2101 (2 tablet)-Given        (0840)-Not Given       1056-Auto Hold       2000-Automatically Held       2038-Unhold       2325 (2 tablet)-Given        1036 (2 tablet)-Given       2009 (2 tablet)-Given        0834 (2 tablet)-Given       2104 (2 tablet)-Given        0810 (2 tablet)-Given       2035 (2 tablet)-Given        0841 (2 tablet)-Given       1936 (2 tablet)-Given        1023 (1 tablet)-Given [C]       [ ] 2000           simethicone (MYLICON) chewable tablet 80 mg  Dose: 80 mg Freq: 4 TIMES DAILY PRN Route: PO  PRN Reason: cramping  PRN Comment: gas  Start: 07/07/17 2046          0134 (80 mg)-Given           sodium chloride (PF) 0.9% PF flush 3 mL  Dose: 3 mL Freq: EVERY 8 HOURS Route: IK  Start: 07/07/17 2100   Admin Instructions: And Q1H PRN, to lock peripheral IV dormant line.      (2114)-Not Given        (0429)-Not Given       (1212)-Not Given       2147 (3 mL)-Given        (0836)-Not Given [C]       (1801)-Not Given [C]        (0320)-Not Given       0804 (3 mL)-Given       1629 (3 mL)-Given        0040 (3 mL)-Given       0844 (3 mL)-Given       1929 (3 mL)-Given        (0000)-Not Given       (1026)-Not Given [C]       [ ] 1600           sodium chloride (PF) 0.9% PF flush 3 mL  Dose: 3 mL Freq: EVERY 1 HOUR PRN Route: IK  PRN Reason: line flush  PRN Comment: for peripheral IV flush post IV meds  Start: 07/07/17 2046              vitamin B complex with vitamin C (STRESS TAB) tablet 1 tablet  Dose: 1 tablet Freq: DAILY Route: PO  Start: 07/04/17 0800    0917 (1 tablet)-Given        (0840)-Not Given       1056-Auto Hold       2038-Unhold        1041 (1 tablet)-Given        1046 (1 tablet)-Given        0810 (1 tablet)-Given        0841 (1 tablet)-Given        1025 (1 tablet)-Given          Discontinued Medications  Medications 07/06/17 07/07/17 07/08/17 07/09/17 07/10/17  07/11/17 07/12/17         Dose: 1 tablet Freq: EVERY 12 HOURS SCHEDULED Route: PO  Indications of Use: SKIN AND SOFT TISSUE INFECTION  Indications Comment: Dog bite  Start: 07/04/17 1115   End: 07/11/17 0707    0916 (1 tablet)-Given       1904 (1 tablet)-Given        0837 (1 tablet)-Given       1056-Auto Hold       2000-Automatically Held       2038-Unhold       2325 (1 tablet)-Given        0842 (1 tablet)-Given       2009 (1 tablet)-Given        0839 (1 tablet)-Given       2104 (1 tablet)-Given        0810 (1 tablet)-Given       2035 (1 tablet)-Given        0707-Med Discontinued          Rate: 14 mL/hr Freq: CONTINUOUS Route: EP  Start: 07/07/17 2145   End: 07/10/17 0644   Admin Instructions: Absolutely no anticoagulants, thrombolytics or antiplatelet medications or other opioid analgesics or other sedatives without prior notification of anesthesiology. For CADD cassettes, pharmacy to send epidural tubing set Ref # [id number] (5.1mL).      2231 ( )-Rate/Dose Verify        0110 ( )-Rate/Dose Verify [C]       1848 ( )-New Syringe/Cartridge       2000 ( )-Rate/Dose Verify        0508 ( )-New Bag [C]       0649 ( )-Rate/Dose Change        0644-Med Discontinued           Dose: 0.2 mg Freq: EVERY 1 MIN PRN Route: IV  PRN Reason: benzodiazepine reversal  Start: 07/07/17 1107   End: 07/10/17 0644   Admin Instructions: Give over 15 seconds. If inadequate response after 45 seconds, may repeat up to a MAX total dose of 1 mg. Continue monitoring until discharge criteria met of minimum of 2 hours.  Irritant.         0644-Med Discontinued           Dose: 200 mg Freq: 3 TIMES DAILY PRN Route: PO  PRN Reason: other  PRN Comment: pain  Start: 07/09/17 2115   End: 07/10/17 0643        0643-Med Discontinued           Dose: 200 mg Freq: 3 TIMES DAILY Route: PO  Start: 07/08/17 2245   End: 07/09/17 2110      2311 (200 mg)-Given        0832 (200 mg)-Given       1431 (200 mg)-Given       2110-Med Discontinued  (2113)-Not Given                 Dose: 2-4 mg Freq: EVERY 3 HOURS PRN Route: PO  PRN Reason: moderate to severe pain  Start: 07/09/17 1728   End: 07/11/17 0800       2119 (2 mg)-Given        0054 (4 mg)-Given       0459 (4 mg)-Given       0812 (4 mg)-Given       1313 (2 mg)-Given       1543 (2 mg)-Given       1725 (2 mg)-Given       2030 (2 mg)-Given [C]       2140 (2 mg)-Given        0029 (4 mg)-Given       0341 (2 mg)-Given       0613 (2 mg)-Given       0646 (2 mg)-Given       0800-Med Discontinued          Rate: 125 mL/hr Freq: CONTINUOUS Route: IV  Start: 07/07/17 1815   End: 07/10/17 0659     2048 ( )-New Bag        0234 ( )-New Bag       1038 ( )-New Bag       1845 ( )-New Bag        0208 ( )-New Bag       1019 ( )-New Bag       1826 ( )-New Bag        0112 ( )-New Bag       0659-Med Discontinued           Freq: CONTINUOUS PRN Route: XX  Start: 07/07/17 2130   End: 07/10/17 0644   Admin Instructions: -All epidural medications must be preservative free  -All orders must be compounded in preservative free Normal Saline  -Absolutely no other opioid analgesics or other sedatives to be administered unless approved by Anesthesia Service  -Absolutely no anticoagulants, thrombolytics, or antiplatelet medications or other opioid analgesics or other sedatives without prior notification of Anesthesia Service  -All patients who receive epidural medications must have IV access.         0644-Med Discontinued           Freq: CONTINUOUS PRN Route: XX  Start: 07/07/17 1652   End: 07/10/17 0644   Admin Instructions: -All epidural medications must be preservative free  -All orders must be compounded in preservative free Normal Saline  -Absolutely no other opioid analgesics or other sedatives to be administered unless approved by Anesthesia Service  -Absolutely no anticoagulants, thrombolytics, or antiplatelet medications or other opioid analgesics or other sedatives without prior notification of Anesthesia Service  -All patients who receive epidural  medications must have IV access.         0644-Med Discontinued           Dose: 10 mg Freq: EVERY 6 HOURS PRN Route: PO  PRN Comment: nausea and vomiting  Start: 07/07/17 2356   End: 07/10/17 0644   Admin Instructions: This is Step 3 of nausea and vomiting management.  Give if nausea not resolved 15 minutes after giving prochlorperazine (COMPAZINE).  If nausea not resolved in 15-30 minutes, Notify provider.         0644-Med Discontinued        Or    Dose: 10 mg Freq: EVERY 6 HOURS PRN Route: IV  PRN Comment: nausea and vomiting  Start: 07/07/17 2356   End: 07/10/17 0644   Admin Instructions: This is Step 3 of nausea and vomiting management.  Give if nausea not resolved 15 minutes after giving prochlorperazine (COMPAZINE).  If nausea not resolved in 15-30 minutes, Notify provider.  Irritant.         0644-Med Discontinued           Dose: 2.5-5 mg Freq: EVERY 6 HOURS PRN Route: IV  PRN Reason: other  PRN Comment: for pruritus  Start: 07/07/17 1652   End: 07/10/17 0644   Admin Instructions: Give 2.5 mg initially. If pruritus persists after 30 minutes, may give additional 2.5 mg. If effective, then repeat effective dose Q6H PRN pruritus.         0644-Med Discontinued           Dose: 0.1-0.4 mg Freq: EVERY 2 MIN PRN Route: IV  PRN Reason: opioid reversal  Start: 07/07/17 2355   End: 07/10/17 0644   Admin Instructions: For respiratory rate LESS than or EQUAL to 8.  Partial reversal dose:  0.1 mg titrated q 2 minutes for Analgesia Side Effects Monitoring Sedation Level of 3 (frequently drowsy, arousable, drifts to sleep during conversation).Full reversal dose:  0.4 mg bolus for Analgesia Side Effects Monitoring Sedation Level of 4 (somnolent, minimal or no response to stimulation).         0644-Med Discontinued           Dose: 4 mg Freq: EVERY 6 HOURS PRN Route: PO  PRN Reasons: nausea,vomiting  Start: 07/07/17 2356   End: 07/10/17 0644   Admin Instructions: This is Step 1 of nausea and vomiting management.  If nausea not  resolved in 15 minutes, go to Step 2 prochlorperazine (COMPAZINE). Do not push through foil backing. Peel back foil and gently remove. Place on tongue immediately. Administration with liquid unnecessary         0644-Med Discontinued        Or    Dose: 4 mg Freq: EVERY 6 HOURS PRN Route: IV  PRN Reasons: nausea,vomiting  Start: 07/07/17 2356   End: 07/10/17 0644   Admin Instructions: This is Step 1 of nausea and vomiting management.  If nausea not resolved in 15 minutes, go to Step 2 prochlorperazine (COMPAZINE).  Irritant.         0644-Med Discontinued           Dose: 5-10 mg Freq: EVERY 3 HOURS PRN Route: PO  PRN Reason: moderate to severe pain  Start: 07/09/17 0926   End: 07/09/17 1728       1025 (10 mg)-Given [C]       1629 (10 mg)-Given       1728-Med Discontinued            Dose: 5-10 mg Freq: EVERY 6 HOURS PRN Route: IV  PRN Reasons: nausea,vomiting  Start: 07/07/17 2356   End: 07/10/17 0644   Admin Instructions: This is Step 2 of nausea and vomiting management.   If nausea not resolved in 15 minutes, give metoclopramide (REGLAN) if ordered (step 3 of nausea and vomiting management)         0644-Med Discontinued        Or    Dose: 5-10 mg Freq: EVERY 6 HOURS PRN Route: PO  PRN Reason: vomiting  Start: 07/07/17 2356   End: 07/10/17 0644   Admin Instructions: This is Step 2 of nausea and vomiting management.   If nausea not resolved in 15 minutes, give metoclopramide (REGLANI) if ordered (step 3 of nausea and vomiting management)         0644-Med Discontinued        Or    Dose: 25 mg Freq: EVERY 12 HOURS PRN Route: RE  PRN Reasons: nausea,vomiting  Start: 07/07/17 2356   End: 07/10/17 0644   Admin Instructions: This is Step 2 of nausea and vomiting management.   If nausea not resolved in 15 minutes, give metoclopramide (REGLAN) if ordered (step 3 of nausea and vomiting management)         0644-Med Discontinued           Dose: 1 tablet Freq: ONCE Route: PO  Start: 07/09/17 1930   End: 07/09/17 1929       1929-Med  Discontinued       Medications 07/06/17 07/07/17 07/08/17 07/09/17 07/10/17 07/11/17 07/12/17               INTERAGENCY TRANSFER FORM - NOTES (H&P, Discharge Summary, Consults, Procedures, Therapies)   7/2/2017                       UNIT [facility] BANK: [phone]               History & Physicals      H&P by Eli Vick MD at 7/3/2017 12:59 AM     Author:  Eli Vick MD Service:  Family Medicine Author Type:  Resident    Filed:  7/3/2017  6:49 PM Date of Service:  7/3/2017 12:59 AM Creation Time:  7/3/2017 12:58 AM    Status:  Attested :  Eli Vick MD (Resident)    Cosigner:  Darwin Antonio MD at 7/4/2017  2:13 PM        Attestation signed by Darwin Antonio MD at 7/4/2017  2:13 PM        Attestation:  This patient has been seen and evaluated by meDarwin on 7/3/17.  I saw and discussed the case with the primary resident and the care team. I agree with the findings and plan in this note. I have reviewed today's vital signs, medications, laboratory results.  Darwin Antonio MD  [facility]          .                                                                                  [KC1.1]                                                 **Please see addendum at bottom of note**[ST1.1]    [facility]  Inpatient History and Physical    Hafsa Corona MRN# [id number]   Age: 62 year old YOB: 1955     7/3/2017 12:58 AM    Home clinic:[KC1.1] [facility][KC1.2]  Primary care provider: Morelia Ayon          Chief Concern:[KC1.1]   Worsening pelvic and abdominal pain       History is obtained from the patient[KC1.2]          History of Present Illness (Resident / Clinician):   Hafsa Corona is a 62 year old[KC1.1] female with history of DM-2, anxiety, HLD, chronic rhinitis, right oophorectomy (2008), DVT ([KC1.2]diagnosed 06/05/17[KC1.3]) on Xarelto who presented to the ED for evaluation of pelvic pain.[KC1.2]    Hafsa  "reports that over the last month she has had increasing sensation of \"bloating and gas\" in her abdomen. She never really experienced any pain. She thought that her abdominal distension was just gas and started taking some digestive aids. But 2 days ago, she experienced severe pain in her lower abdomen. She went to the ED and was found to have a large mass. She was discharged home with instructions to follow up with gyn-onc outpatient. Due to worsening pain, decreased appetite, 2 episodes of bladder incontinence, she returned to the ED. She also endorses pressure and urge to defecate but has not had a \"good bm\". Feels constipated. Some anal pain. Denies any blood in stool. No nausea/vomiting. Reports having trouble breathing because of the distension. Also reports having a slight cough, has had fever of about 100.3 F.     Over the last year, patient has grown progressively more fatigued. She has had several emotional struggles- including family, financial crises. She also reports struggles with mental health.     She has been pregnant twice but had miscarriages both times. Has been off and on birth control pills. No vaginal bleeding since menopause. Has not had any abnormal pap smears till date.[KC1.3]     In addition, she also is currently being managed for a dog bite that occurred 5 days ago. She was discharged from the ED on 07/01 with a course of Augmentin but due to her ongoing symptoms, she has not been able to fill the script. She presented to the ED today with a large red lump that was incised and drained in the ED. She was febrile and tachycardic and hence was started on antibiotics while in the ED.     Old re[KC1.2]cords were reviewed, and any additions to pertinent history are noted above.           Past Medical History:     Past Medical History:   Diagnosis Date     Anxiety state, unspecified     [id number]     Attention deficit disorder without mention of hyperactivity      Chronic rhinitis      " Depressive disorder, not elsewhere classified      Panic disorder without agoraphobia      Pure hypercholesterolemia     Lipitor     Tachycardia, unspecified     1999-2004     Type II or unspecified type diabetes mellitus without mention of complication, not stated as uncontrolled     diagnosed              Past Surgical History:     Past Surgical History:   Procedure Laterality Date     SALPINGO OOPHORECTOMY,R/L/KAYY      Salpingo Oophorectomy, RT     SURGICAL HISTORY OF -       facial surgery d/t fall in 2002     SURGICAL HISTORY OF -        removal of breast cyst     SURGICAL HISTORY OF -       endometrial ablation             Social History:[KC1.1]     Social History   Substance Use Topics     Smoking status: Never Smoker     Smokeless tobacco: Never Used     Alcohol use Yes      Comment: rarely[KC1.4]             Family History:[KC1.1]     Family History   Problem Relation Age of Onset     C.A.D. Father      DIABETES Father      Hypertension Father      CEREBROVASCULAR DISEASE Father      Psychotic Disorder Father      Pancreatic Cancer Father      C.A.D. Mother      Hypertension Mother      Breast Cancer Mother      Psychotic Disorder Mother      Colon Cancer Mother      CANCER Mother      Bone cancer     Prostate Problems Brother      cleared      Psychotic Disorder Sister      x2     Neurologic Disorder Sister      Psychotic Disorder Brother      x2     Depression Brother      major depressive disorder and OCD     Anxiety Disorder Brother      MENTAL ILLNESS Brother      Psychotic Disorder Maternal Grandmother      ?     Psychotic Disorder Maternal Grandfather      ?     Psychotic Disorder Paternal Grandmother      Schizophernia     Psychotic Disorder Paternal Grandfather      ?     Respiratory Paternal Grandfather       of emphysema and smoking     Psychotic Disorder Sister      Other - See Comments Sister      small kidney stone      Cancer - colorectal No family hx of[KC1.4]       Family history reviewed[KC1.2]            Immunizations:[KC1.1]     Immunization History   Administered Date(s) Administered     Influenza (IIV3) 11/09/2006, 10/11/2007, 11/21/2008, 09/29/2009, 01/10/2013     Influenza Vaccine IM 3yrs+ 4 Valent IIV4 11/19/2013, 02/04/2016, 09/06/2016     Influenza Vaccine, 3 YRS +, IM (QUADRIVALENT W/PRESERVATIVES) 11/21/2014     Pneumococcal 23 valent 11/19/2013     TDAP Vaccine (Adacel) 02/04/2016[KC1.5]             Allergies:   Sulfa drugs and Wasps [hornets]          Medications:     No current facility-administered medications on file prior to encounter.   Current Outpatient Prescriptions on File Prior to Encounter:  rivaroxaban ANTICOAGULANT (XARELTO) 20 MG TABS tablet Take 1 tablet (20 mg) by mouth daily (with dinner)   Digestive Enzymes (DIGESTIVE ENZYME PO) Take by mouth as needed    CALCIUM-MAGNESIUM-VITAMIN D PO Take 2 tablets by mouth daily   Probiotic Product (PRO-BIOTIC BLEND PO) Take 1 capsule by mouth daily Enzymatic Therapy-probiotic pearls   atorvastatin (LIPITOR) 80 MG tablet Take 1 tablet (80 mg) by mouth daily   lisinopril (PRINIVIL/ZESTRIL) 10 MG tablet Take 1 tablet (10 mg) by mouth daily   metFORMIN (GLUCOPHAGE-XR) 500 MG 24 hr tablet Take two tabs by mouth once daily with evening meal.   escitalopram (LEXAPRO) 20 MG tablet Take 1 tablet (20 mg) by mouth daily   VYVANSE 50 MG capsule Reported on 4/11/2017   fluticasone (FLONASE) 50 MCG/ACT nasal spray Spray 2 sprays into both nostrils daily   Cholecalciferol (VITAMIN D) 1000 UNIT capsule Take 2,000 Units by mouth daily    MULTIVITAMIN OR Daily.   amoxicillin-clavulanate (AUGMENTIN) 875-125 MG per tablet Take 1 tablet by mouth 2 times daily for 10 days   traMADol (ULTRAM) 50 MG tablet Take 1 tablet (50 mg) by mouth every 6 hours as needed for pain maximum 4 tablet(s) per day   order for DME Compression stockings 20-30 mm Hg. To be wear when directed by Hematology team and while awake for the first two years  post clot.   order for DME Equipment being ordered: blood pressure monitor   EPINEPHrine (EPIPEN) 0.3 MG/0.3ML injection Inject 0.3 mLs (0.3 mg) into the muscle once as needed for anaphylaxis   albuterol (ALBUTEROL) 108 (90 BASE) MCG/ACT inhaler Inhale 2 puffs into the lungs 4 times daily as needed for shortness of breath / dyspnea   UNKNOWN MED DOSAGE vitamin B 50            Review of Systems:   Review of Systems   Constitutional: Positive for[KC1.1] appetite change[KC1.3],[KC1.1] fatigue[KC1.3] and[KC1.1] fever[KC1.3].   HENT: Negative for[KC1.1] congestion[KC1.3],[KC1.1] rhinorrhea[KC1.3] and[KC1.1] sore throat[KC1.3].    Eyes: Negative for[KC1.1] visual disturbance[KC1.3].   Respiratory: Positive for[KC1.1] cough[KC1.3] and[KC1.1] shortness of breath[KC1.3].    Cardiovascular: Positive for[KC1.1] chest pain (occasional chest pain with exertion, has had cardiac workup for CAD in the past which was negative)[KC1.3] and[KC1.1] palpitations (tachycardia)[KC1.3].   Gastrointestinal: Positive for[KC1.1] abdominal pain[KC1.3] and[KC1.1] constipation[KC1.3]. Negative for[KC1.1] nausea[KC1.3] and[KC1.1] vomiting[KC1.3].   Genitourinary: Positive for[KC1.1] pelvic pain[KC1.3]. Negative for[KC1.1] dysuria[KC1.3],[KC1.1] frequency[KC1.3] and[KC1.1] hematuria[KC1.3].   Skin: Positive for[KC1.1] wound (left thigh)[KC1.3].   Neurological: Negative for[KC1.1] dizziness[KC1.3],[KC1.1] syncope[KC1.3],[KC1.1] light-headedness[KC1.3] and[KC1.1] headaches[KC1.3].               Physical Exam:[KC1.1]   Vitals were reviewed[KC1.2]  Temp: 100.9  F (38.3  C) Temp src: Oral BP: 124/83 Pulse: 124   Resp: 16 SpO2: 93 % O2 Device: None (Room air)[KC1.5]    Physical Exam   Constitutional: She is[KC1.1] oriented to person, place, and time[KC1.3]. She appears[KC1.1] well-developed[KC1.3] and[KC1.1] well-nourished[KC1.3].[KC1.1] No distress[KC1.3].   HENT:   Head:[KC1.1] Normocephalic[KC1.3] and[KC1.1] atraumatic[KC1.3].   Right Ear:[KC1.1]  External ear[KC1.3] normal.   Left Ear:[KC1.1] External ear[KC1.3] normal.   Mouth/Throat:[KC1.1] Oropharynx is clear and moist[KC1.3]. No[KC1.1] oropharyngeal exudate[KC1.3].   Eyes:[KC1.1] Conjunctivae[KC1.3] and[KC1.1] EOM[KC1.3] are normal.[KC1.1] No scleral icterus[KC1.3].   Neck:[KC1.1] Normal range of motion[KC1.3].   Cardiovascular:[KC1.1] Normal heart sounds[KC1.3].[KC1.1]  Tachycardia[KC1.3] present.    Pulmonary/Chest:[KC1.1] Effort normal[KC1.3] and[KC1.1] breath sounds normal[KC1.3]. No[KC1.1] respiratory distress[KC1.3]. She has[KC1.1] no wheezes[KC1.3]. She has[KC1.1] no rales[KC1.3].   Abdominal:[KC1.1] Soft[KC1.3]. She exhibits[KC1.1] distension[KC1.3] and[KC1.1] mass[KC1.3]. There is[KC1.1] tenderness[KC1.3] in the[KC1.1] periumbilical area[KC1.3],[KC1.1] suprapubic area[KC1.3] and[KC1.1] left lower quadrant[KC1.3]. There is[KC1.1] no rebound[KC1.3] and[KC1.1] no guarding[KC1.3].   Musculoskeletal:[KC1.1] Normal range of motion[KC1.3]. She exhibits[KC1.1] edema (left lower extremity swollen as compared to right)[KC1.3].   Lymphadenopathy:     She has[KC1.1] no cervical adenopathy[KC1.3].   Neurological: She is[KC1.1] alert[KC1.3] and[KC1.1] oriented to person, place, and time[KC1.3]. No[KC1.1] cranial nerve deficit[KC1.3].   Psychiatric: Her mood appears[KC1.1] anxious[KC1.3]. She[KC1.1] exhibits a depressed mood[KC1.3]. She expresses[KC1.1] no suicidal[KC1.3] ideation. She expresses[KC1.1] no suicidal plans[KC1.3].[KC1.1]   Cries easily[KC1.3]              Data:     Results for orders placed or performed during the hospital encounter of 07/02/17 (from the past 24 hour(s))   CBC with platelets differential   Result Value Ref Range    WBC 9.3 4.0 - 11.0 10e9/L    RBC Count 3.15 (L) 3.8 - 5.2 10e12/L    Hemoglobin 8.6 (L) 11.7 - 15.7 g/dL    Hematocrit 26.8 (L) 35.0 - 47.0 %    MCV 85 78 - 100 fl    MCH 27.3 26.5 - 33.0 pg    MCHC 32.1 31.5 - 36.5 g/dL    RDW 14.2 10.0 - 15.0 %    Platelet Count 296  150 - 450 10e9/L    Diff Method Automated Method     % Neutrophils 87.0 %    % Lymphocytes 7.9 %    % Monocytes 4.8 %    % Eosinophils 0.1 %    % Basophils 0.1 %    % Immature Granulocytes 0.1 %    Nucleated RBCs 0 0 /100    Absolute Neutrophil 8.1 1.6 - 8.3 10e9/L    Absolute Lymphocytes 0.7 (L) 0.8 - 5.3 10e9/L    Absolute Monocytes 0.5 0.0 - 1.3 10e9/L    Absolute Eosinophils 0.0 0.0 - 0.7 10e9/L    Absolute Basophils 0.0 0.0 - 0.2 10e9/L    Abs Immature Granulocytes 0.0 0 - 0.4 10e9/L    Absolute Nucleated RBC 0.0    INR   Result Value Ref Range    INR 1.17 (H) 0.86 - 1.14   Comprehensive metabolic panel   Result Value Ref Range    Sodium 141 133 - 144 mmol/L    Potassium 4.1 3.4 - 5.3 mmol/L    Chloride 104 94 - 109 mmol/L    Carbon Dioxide 25 20 - 32 mmol/L    Anion Gap 12 3 - 14 mmol/L    Glucose 129 (H) 70 - 99 mg/dL    Urea Nitrogen 19 7 - 30 mg/dL    Creatinine 0.84 0.52 - 1.04 mg/dL    GFR Estimate 68 >60 mL/min/1.7m2    GFR Estimate If Black 83 >60 mL/min/1.7m2    Calcium 8.4 (L) 8.5 - 10.1 mg/dL    Bilirubin Total 0.7 0.2 - 1.3 mg/dL    Albumin 3.2 (L) 3.4 - 5.0 g/dL    Protein Total 6.6 (L) 6.8 - 8.8 g/dL    Alkaline Phosphatase 92 40 - 150 U/L    ALT 14 0 - 50 U/L    AST 27 0 - 45 U/L   Lactic acid whole blood   Result Value Ref Range    Lactic Acid 0.7 0.7 - 2.1 mmol/L   US Pelvic Complete w Transvaginal & Abd/Pel Duplex Limited    Narrative    US PELVIS COMPLETE WITH TRANSVAGINAL AND DOPPLER LIMITED    7/3/2017  12:18 AM     HISTORY: Pelvic pain. Status post right oophorectomy.    TECHNIQUE: Transvaginal images were performed to better evaluate the  patient's uterus, ovaries and endometrial stripe. Spectral Doppler and  wave form analysis was also performed to evaluate blood flow to the  ovaries.    COMPARISON: CT of the abdomen and pelvis performed 7/1/2017.    FINDINGS: The uterus is measured at 8.4 x 6.2 x 4.1 cm. No fibroids  are evident. Endometrial stripe could not be visualized. The  right  ovary is not seen, consistent with history of recent right  oophorectomy. The left ovary is unremarkable. There is a large complex  pelvic mass, difficult to measure due to its large size. The origin of  this mass could not be identified on this exam and this finding was  better seen on CT. No free pelvic fluid is present. Spectral Doppler  and waveform analysis demonstrate blood flow to the left ovary.      Impression    IMPRESSION:  1. Large complex pelvic mass is difficult to measure due to its large  size and the origin of this mass is not identified on this exam.   2. There is a large amount of slightly complex free fluid in the  pelvis.[KC1.1]      All cardiac studies reviewed by me.   All imaging studies reviewed by me.[KC1.3]        Assessment and Plan:   Assessment:   Hafsa Corona is a 62 year old[KC1.1] female with history of DM-2, anxiety, HLD, chronic rhinitis, right oophorectomy (2008), DVT ([KC1.2]diagnosed 06/05/17[KC1.3]) on Xarelto who[KC1.2] presented with pelvic pain, worsening abdominal distension and found to have a large pelvic mass of unclear etiology and origin. Being admitted for further evaluation of this mass.[KC1.3]   Patient Active Problem List   Diagnosis     Attention deficit disorder     PANIC DISORDER      VASOMOTOR RHINITIS     Chronic Maxillary Sinusitis     Tachycardia     Type 2 diabetes mellitus without complication (H)     HYPERLIPIDEMIA LDL GOAL <100     Allergic rhinitis due to other allergen     BMI > 35     Essential hypertension     Lumbago     Major depressive disorder, recurrent episode, mild (H)     Long-term (current) use of anticoagulants [Z79.01]     Deep vein thrombosis (DVT) (H) [I82.409]         Plan:[KC1.1]   ##Left lower extremity abscess s/p I & D, possibly complicated with cellulitis  Patient now 5 days out of the dog bite. As per patient, her dog was not uptodate on rabies shot but she has been in touch with animal services and does not believe that  her go has rabies. She presented to the ED with fever, tachycardic. Her labs including CBC, CMP and lactic acid were normal except for a normocytic anemia. Due to concerns for[KC1.2] c[KC1.6]ellulitis, patient was started on broad spectrum antibiotics. Plan to continue them for another 24 hrs. De-escalate pending clinical improvement.[KC1.2] Patient up to date on tetanus.[KC1.6]      -Vitals Q4H  -Continue IV vancomycin and zosyn (started 07/02)  -Wound care consult  -Leg elevation  -Pain relief with[KC1.2] IV dilaudid 0.3-0.5 mg Q4H PRN, tylenol 1000 mg Q8H PRN[KC1.3]  -IV Zofran for nausea[KC1.2]  -Follow up blood cultures[KC1.7]    ##Pelvic pain secondary to the Large mass originating from the right adnexa[KC1.2]  CT scan suggestive of large cystic and solid lesion arising from the right ovary/adnexa. The origin of the mass in unclear. But the mass effect of the lesion in addition to the ascites is likely the source of patient's symptoms. She does not have a leukocytosis, her kidney and liver function is normal on admission. Abdomen exam is not suggestive of an acute abdomen- no rebound, guarding, peritonitic signs. Possible that the constipation is due to compression effects of the mass.[KC1.3]   -Gyn-onc consult in the AM[KC1.2]  -Consider surgery consult as well  -Pain relief as above  -Consider paracentesis for diagnostic purposes and for symptom relief    ##Left lower extremity DVT  Diagnosed in early June 2017. Patient initially on warfarin but then started on xarelto. Due to being in the ED, she has missed her evening dose on 07/02. Discussed medication kinetics with inpatient pharmacist. Recommend giving the dose in morning, then repeating in the evening and then switching to morning thereafter. If a potential surgery is planned, patient will have to be off Xarelto for 48 hrs.   -[KC1.3] Will hold Xarelto pending procedure[KC1.8]    ##Normocytic anemia  Patient's baseline hemoglobin is around 10. Her  hemoglobin has been down trending over the last month. DDx include malignancy (chronic disease) vs nutritional deficiency. No active signs or symptoms of bleeding.  -Iron studies ordered  -Hemolysis labs ordered.   -Peripheral blood smear to look at RBC morphology.     Chronic stable conditions:[KC1.3]   ##[KC1.2]HTN- Cont PTA lisinopril[KC1.3]   ##Type 2 DM, HgbA1c 6 on 17  -Hold home metformin while patient in hospital  -Start[KC1.2] M[KC1.3]SSI correction while inpatient  ##[KC1.2] HLD- Cont PTA lipitor  ##Depression- Cont PTA lexapro[KC1.3]    Daily cares -   F:[KC1.1] NS @ 75 cc/hr[KC1.3]  E:[KC1.1]wnl[KC1.3]  N:[KC1.1]NPO[KC1.3]  Lines:[KC1.1]PIV[KC1.3]   Activity:[KC1.1]-Up as tolerated[KC1.3]  CODE:[KC1.1]Full Code[KC1.3]  Prophylaxis:[KC1.1] On xarelto[KC1.3]  PCP communication:  - Morelia Ayon  Dispo: Expected discharge date[KC1.1] 2-3 days pending clinical improvement, diagnostic workup.      Discussed with faculty but not examined by faculty.[KC1.3]  Yolande Shankar MD[KC1.1]    ADDENDUM  Assessment:  Hafsa is a 61 yo F with h/o right oopherectomy (), DVT (diagnosed 2017) on xarelto, DM2, HLD, admitted for evaluation of pelvic mass on CT concerning for malignancy and management of LUE abscess s/p I&D (7/3)    Pertinent OB/GYN in addition to H&P above:  [id number] of 2 elective abortions  Endometrial ablation for menorrhagia 2/2 uterine fibroids ()  Right salpingo-oopherectomy 2/2 endometrioma in   Menopause in  (following endometrial ablation), no h/o HRT    Plan:  ##Pelvic Mass  ##Pelvic Pain, abdominal distension   CT scan concerning for malignancy of gyn origin. Gyn/onc consulted and given appearance on imaging combined with patient's symptoms, recommend surgical management for both treatment and diagnosis. Gyn/onc to follow daily.     - f/u   - currently on OR schedule for 2017 for exploratory laparotomy, bilateral SPO, possible cancer staging  including hysterectomy, pelvic/para-aortic lymphadenopathy, omentectomy, peritoneal biopsies. Gyn/onc team will discuss this plan with patient today  -per gyn/onc, will need IVC filter placed prior to surgery. Will plan to have this done Wednesday by IR  -hold Xarelto 24hrs prior to surgery. No need to hold Xarelto prior to IVC filter per IR  -pain control with IV dilaudid 0.3-0.5mg q4h prn, tylenol 1000mg q8h prn  -zofran for nausea  -optimize medically prior to surgery    ##difficulty with deep inspiration, cough  Hafsa reports difficulty with deep inspiration, likely due to compression on diaphragm by mass preventing full lung expansion.     -CXR to rule out pneumonia  -incentive spirometry     ##Left upper extremity abscess s/p I & D, possibly complicated with cellulitis  Due to dog bite incident[ST1.2] on 6/27.[ST1.3]  Patient up to date on tetanus. Started on broad-spectrum abx     -Continue IV vancomycin and zosyn (started 07/02) for now. Plan to de-escalate to PO augmentin possibly tomorrow   -Wound care consult  -Leg elevation  -Pain relief with IV dilaudid 0.3-0.5 mg Q4H PRN, tylenol 1000 mg Q8H PRN  -IV Zofran for nausea[ST1.2]    ##Left lower extremity DVT  Diagnosed in early June 2017. Patient initially on warfarin but then started on xarelto. May be secondary to venous compression by pelvic mass causing venous stasis vs malignancy vs hypercoagulable state  -continue xarelto for now  -will need to be held 24hr prior to surgery  -no need to hold xarelto prior to IVC filter placement per IR[ST1.3]    ##Normocytic anemia  Patient's baseline hemoglobin is around 10. Her hemoglobin has been down trending over the last month. DDx include malignancy (chronic disease) vs nutritional deficiency. No active signs or symptoms of bleeding.  -Iron studies show low iron and iron saturation, normal TIBC and transferrin. Will start PO iron replacement  -Hemolysis labs ordered. Will follow up  -Peripheral blood smear to  look at RBC morphology.     Chronic stable conditions:   ##HTN- Cont PTA lisinopril   ##Type 2 DM, HgbA1c 6 on 04/11/17  -Hold home metformin while patient in hospital  -Start MSSI correction while inpatient  ## HLD- Cont PTA lipitor  ##Depression- Cont PTA lexapro    Daily cares -   F: PO  E:wnl  N:regular diet  Lines:PIV   Activity:-Up as tolerated  CODE:Full Code  Prophylaxis: On xarelto  PCP communication:  - Morelia Ayon  Dispo: Expected discharge date pending recovery from surgery, scheduled for Friday 7/7    Patient seen and plan discussed with staff Dr. Darwin Vick MD  Family Medicine PGY2[ST1.2]                Revision History        User Key Date/Time User Provider Type Action    > ST1.3 7/3/2017  6:49 PM Eli Vick MD Resident Sign     [N/A] 7/3/2017  5:08 PM Eli Vick MD Resident Sign     ST1.1 7/3/2017  5:08 PM Eli Vick MD Resident Sign     ST1.2 7/3/2017  4:39 PM Eli Vick MD Resident      KC1.8 7/3/2017  6:54 AM Yolande Shankar MD Resident Sign     KC1.7 7/3/2017  5:44 AM Yolande Shankar MD Resident      KC1.6 7/3/2017  5:40 AM Yolande Shankar MD Resident      KC1.3 7/3/2017  4:34 AM Yolande Shankar MD Resident      KC1.5 7/3/2017  1:21 AM Yolande Shankar MD Resident      KC1.4 7/3/2017  1:20 AM Yolande Shankar MD Resident      KC1.2 7/3/2017  1:16 AM Yolande Shankar MD Resident      KC1.1 7/3/2017 12:58 AM Yolande Shankar MD Resident             H&P by Serena Cole MD at 7/3/2017 11:23 AM     Author:  Serena Cole MD Service:  Gyn/Onc Author Type:  Physician    Filed:  7/3/2017  3:42 PM Date of Service:  7/3/2017 11:23 AM Creation Time:  7/3/2017 10:32 AM    Status:  Addendum :  Serena Cole MD (Physician)         [facility]: Gynecology Oncology Consult    Hafsa Corona MRN# [id number]   Age: 62 year old YOB: 1955     Date of Admission:  7/2/2017    Primary care  "provider: Morelia Ayon             Chief Complaint:   Abdominal pain and distention         History of Present Illness:   This patient is a 62 year old female with PMH of DM type II and past surgical history pertinent for[DF1.1] RSO[KB1.1] for endometrotic cyst of the ovary who presented with sudden onset of[DF1.1] severe[KB1.1] diffuse abdominal pain[DF1.1] that began yesterday evening. Patient also reports a[KB1.1] one month history of[DF1.1] abdominal[KB1.1] bloating and distention. She was originally seen in the ED on 17[DF1.1] for severe abdominal pain[KB1.1] and was found to have a [id number] pelvis mass- she was told to follow up with gyn[DF1.1] oncology as an outpatient[KB1.1].[DF1.1] On  pt had another episode of severe[KB1.1] abdominal pain along with 2[KB1.2]  episodes of urinary incontinence[DF1.1] prompting her to return[KB1.2] to the ED[DF1.1]. She was admitted by the family medicine service for further workup. She reports continued[KB1.2] diffuse abdominal pain that, controlled with IV dilaudid. She has also experienced anorexia over the past 2 days and feeling more tired that usual. Some SOB due to[DF1.1] \"[KB1.2]increased pressure on the lungs from her abdomen[DF1.1]\"[KB1.2]. Her stools have been[DF1.1] \"hard and small\"[KB1.2] over the past 2 days, her last normal stool was Saturday morning. She denies chest pain, palpitations, dysuria, hematuria, vaginal bleeding or vaginal discharge. Of note she was diagnosed with a[DF1.1] LLE DVT on 17. This was unprovoked. Originally[KB1.2] started on warfarin but then was switched to xarelto[DF1.1] by Hematologist[KB1.2].[DF1.1]    Ob/Gyn History   - Hx of 2 elective abortions  Endometrial ablation for menorrhagia  uterine fibroids in   RSO 2/2[KB1.2] endometrioma[DT1.1] in 2007[KB1.2].[DT1.1]  Menopause in 2008 after endometrial ablation, no HRT.[KB1.2]  Longest OCP use was 4 years.   F[DF1.1]H: Mother w/ breast CA and colon CA, " father with pancreatic CA. No FH of female reproductive CA[KB1.2]    Preventative screening has been done in combination with her primary care provider and her primary Ob/Gyn.  She receives regular gynecologic care through [facility] and her last visit was in 2015. She still has her left ovary, uterus and cervix. She was told in 2015 that she had a cyst on her left ovary. She reports no abnormal pap smears, last one was in 2015. Negative colonoscopy in 2013. Normal mammogram in 2014.          Cancer Treatment History:   N/A         Past Medical History:     Past Medical History:   Diagnosis Date     Anxiety state, unspecified     [id number]     Attention deficit disorder without mention of hyperactivity      Chronic rhinitis      Depressive disorder, not elsewhere classified      Panic disorder without agoraphobia      Pure hypercholesterolemia     Lipitor     Tachycardia, unspecified     9/1999-2/2004     Type II or unspecified type diabetes mellitus without mention of complication, not stated as uncontrolled     diagnosed 2004            Past Surgical History:      Past Surgical History:   Procedure Laterality Date     SALPINGO OOPHORECTOMY,R/L/KAYY  2008    Salpingo Oophorectomy, RT     SURGICAL HISTORY OF -       facial surgery d/t fall in 1/2002     SURGICAL HISTORY OF -       1985 removal of breast cyst     SURGICAL HISTORY OF -   2008    endometrial ablation            Social History:     Social History   Substance Use Topics     Smoking status: Never Smoker     Smokeless tobacco: Never Used     Alcohol use Yes      Comment: rarely            Family History:     Family History   Problem Relation Age of Onset     C.A.D. Father      DIABETES Father      Hypertension Father      CEREBROVASCULAR DISEASE Father      Psychotic Disorder Father      Pancreatic Cancer Father      C.A.D. Mother      Hypertension Mother      Breast Cancer Mother      Psychotic Disorder Mother      Colon Cancer Mother      CANCER Mother       Bone cancer     Prostate Problems Brother      cleared      Psychotic Disorder Sister      x2     Neurologic Disorder Sister      Psychotic Disorder Brother      x2     Depression Brother      major depressive disorder and OCD     Anxiety Disorder Brother      MENTAL ILLNESS Brother      Psychotic Disorder Maternal Grandmother      ?     Psychotic Disorder Maternal Grandfather      ?     Psychotic Disorder Paternal Grandmother      Schizophernia     Psychotic Disorder Paternal Grandfather      ?     Respiratory Paternal Grandfather       of emphysema and smoking     Psychotic Disorder Sister      Other - See Comments Sister      small kidney stone      Cancer - colorectal No family hx of             Immunizations:     Immunization History   Administered Date(s) Administered     Influenza (IIV3) 2006, 10/11/2007, 2008, 2009, 01/10/2013     Influenza Vaccine IM 3yrs+ 4 Valent IIV4 2013, 2016, 2016     Influenza Vaccine, 3 YRS +, IM (QUADRIVALENT W/PRESERVATIVES) 2014     Pneumococcal 23 valent 2013     TDAP Vaccine (Adacel) 2016            Allergies:     Allergies   Allergen Reactions     Sulfa Drugs      Wasps [Hornets]             Medications:     Current Facility-Administered Medications   Medication     albuterol (PROAIR HFA/PROVENTIL HFA/VENTOLIN HFA) Inhaler 2 puff     atorvastatin (LIPITOR) tablet 80 mg     cholecalciferol (vitamin D) tablet 2,000 Units     escitalopram (LEXAPRO) tablet 20 mg     fluticasone (FLONASE) 50 MCG/ACT spray 2 spray     lisinopril (PRINIVIL/ZESTRIL) tablet 10 mg     naloxone (NARCAN) injection 0.1-0.4 mg     Patient is already receiving anticoagulation with heparin, enoxaparin (LOVENOX), warfarin (COUMADIN)  or other anticoagulant medication     0.9% sodium chloride infusion     senna-docusate (SENOKOT-S;PERICOLACE) 8.6-50 MG per tablet 1-2 tablet     ondansetron (ZOFRAN-ODT) ODT tab 4 mg    Or     ondansetron (ZOFRAN)  injection 4 mg     glucose 40 % gel 15-30 g    Or     dextrose 50 % injection 25-50 mL    Or     glucagon injection 1 mg     insulin aspart (NovoLOG) inj (RAPID ACTING)     insulin aspart (NovoLOG) inj (RAPID ACTING)     piperacillin-tazobactam (ZOSYN) 3.375 g vial to attach to  mL bag     vancomycin (VANCOCIN) 1,500 mg in NaCl 0.9 % 250 mL intermittent infusion     HYDROmorphone (PF) (DILAUDID) injection 0.3-0.5 mg     acetaminophen (TYLENOL) tablet 1,000 mg     rivaroxaban ANTICOAGULANT (XARELTO) tablet 20 mg     HYDROmorphone (PF) (DILAUDID) injection 0.5 mg[DF1.1]     Current Outpatient Prescriptions on File Prior to Encounter:  rivaroxaban ANTICOAGULANT (XARELTO) 20 MG TABS tablet Take 1 tablet (20 mg) by mouth daily (with dinner)   Digestive Enzymes (DIGESTIVE ENZYME PO) Take by mouth as needed    CALCIUM-MAGNESIUM-VITAMIN D PO Take 2 tablets by mouth daily   Probiotic Product (PRO-BIOTIC BLEND PO) Take 1 capsule by mouth daily Enzymatic Therapy-probiotic pearls   atorvastatin (LIPITOR) 80 MG tablet Take 1 tablet (80 mg) by mouth daily   lisinopril (PRINIVIL/ZESTRIL) 10 MG tablet Take 1 tablet (10 mg) by mouth daily   metFORMIN (GLUCOPHAGE-XR) 500 MG 24 hr tablet Take two tabs by mouth once daily with evening meal.   escitalopram (LEXAPRO) 20 MG tablet Take 1 tablet (20 mg) by mouth daily   VYVANSE 50 MG capsule Reported on 4/11/2017   fluticasone (FLONASE) 50 MCG/ACT nasal spray Spray 2 sprays into both nostrils daily   Cholecalciferol (VITAMIN D) 1000 UNIT capsule Take 2,000 Units by mouth daily    MULTIVITAMIN OR Daily.   amoxicillin-clavulanate (AUGMENTIN) 875-125 MG per tablet Take 1 tablet by mouth 2 times daily for 10 days   traMADol (ULTRAM) 50 MG tablet Take 1 tablet (50 mg) by mouth every 6 hours as needed for pain maximum 4 tablet(s) per day   order for DME Compression stockings 20-30 mm Hg. To be wear when directed by Hematology team and while awake for the first two years post clot.   order  "for DME Equipment being ordered: blood pressure monitor   EPINEPHrine (EPIPEN) 0.3 MG/0.3ML injection Inject 0.3 mLs (0.3 mg) into the muscle once as needed for anaphylaxis   albuterol (ALBUTEROL) 108 (90 BASE) MCG/ACT inhaler Inhale 2 puffs into the lungs 4 times daily as needed for shortness of breath / dyspnea   UNKNOWN MED DOSAGE vitamin B 50[KB1.3]              Review of Systems:   CONSTITUTIONAL:  positive for[DF1.1]  f[KB1.2]michael most likely due to recent dog bite, fatigue, anorexia[DF1.1].[KB1.2] No unintentional weight loss.  RESPIRATORY:  positive for SOB   CARDIOVASCULAR:[DF1.1]  Negative for chest pain, palpitations[KB1.2]  GASTROINTESTINAL:  positive for constipation, abdominal mas[DF1.1]s,[KB1.2] abdominal distention[DF1.1], and abdominal pain.[KB1.2]   GENITOURINARY:  negative for frequency, dysuria, nocturia and hematuria. Patient had 2 episodes of urinary incontinence in the last 2 days.   HEMATOLOGIC/LYMPHATIC:  negative for easy bruising and bleeding  ENDOCRINE:  positive for history of well controlled diabetes. Per patient reports last HA1C was <7%.          Physical Exam:[DF1.1]     Vitals:    07/03/17 0324 07/03/17 0335 07/03/17 0556 07/03/17 1123   BP: 132/79  118/75 118/73   BP Location: Right arm  Right arm    Pulse: 102  98 98   Resp: 16  18 20   Temp: 98.8  F (37.1  C)  99.3  F (37.4  C) 99.9  F (37.7  C)   TempSrc: Oral  Oral Oral   SpO2: 93%  93%    Weight:  92.4 kg (203 lb 12.8 oz)     Height:  1.613 m (5' 3.5\")[KB1.4]       General: in no acute distress, pleasant and interactive  CV: RRR,[DF1.1] systolic ejection murmur best heard at RUSB.[KB1.2]   Resp:[DF1.1] Mildly labored breathing when talking. Intermittent wheezes heart in right lung base, clear with continued deep breathing. No crackles.[KB1.2]   Abdomen:[DF1.1] D[KB1.2]istended[DF1.1], tympanitic[KB1.2] abdomen[DF1.1]. Firm mass palpable in the LLQ. Mild tenderness to palpation in lower quadrants. Bowel sounds present.[KB1.2] "   :[DF1.1] Normal appearing external genitalia. Cervix appears normal without visible lesions. Scant white discharge from cervical os. Large, firm mass palpable on bimanual exam in the LLQ. No tenderness with bimanual exam.[KB1.2]   Extremities: 2+left LE edema in the ankle and calf. No erythema present near bite site on left lateral thigh, wound covered by bandage. 1+ right LE edema          Data:     Results for orders placed or performed during the hospital encounter of 07/02/17 (from the past 24 hour(s))   Blood culture   Result Value Ref Range    Specimen Description Blood Left Arm     Special Requests Aerobic and anaerobic bottles received     Culture Micro No growth after 3 hours     Micro Report Status Pending    CBC with platelets differential   Result Value Ref Range    WBC 9.3 4.0 - 11.0 10e9/L    RBC Count 3.15 (L) 3.8 - 5.2 10e12/L    Hemoglobin 8.6 (L) 11.7 - 15.7 g/dL    Hematocrit 26.8 (L) 35.0 - 47.0 %    MCV 85 78 - 100 fl    MCH 27.3 26.5 - 33.0 pg    MCHC 32.1 31.5 - 36.5 g/dL    RDW 14.2 10.0 - 15.0 %    Platelet Count 296 150 - 450 10e9/L    Diff Method Automated Method     % Neutrophils 87.0 %    % Lymphocytes 7.9 %    % Monocytes 4.8 %    % Eosinophils 0.1 %    % Basophils 0.1 %    % Immature Granulocytes 0.1 %    Nucleated RBCs 0 0 /100    Absolute Neutrophil 8.1 1.6 - 8.3 10e9/L    Absolute Lymphocytes 0.7 (L) 0.8 - 5.3 10e9/L    Absolute Monocytes 0.5 0.0 - 1.3 10e9/L    Absolute Eosinophils 0.0 0.0 - 0.7 10e9/L    Absolute Basophils 0.0 0.0 - 0.2 10e9/L    Abs Immature Granulocytes 0.0 0 - 0.4 10e9/L    Absolute Nucleated RBC 0.0    INR   Result Value Ref Range    INR 1.17 (H) 0.86 - 1.14   Comprehensive metabolic panel   Result Value Ref Range    Sodium 141 133 - 144 mmol/L    Potassium 4.1 3.4 - 5.3 mmol/L    Chloride 104 94 - 109 mmol/L    Carbon Dioxide 25 20 - 32 mmol/L    Anion Gap 12 3 - 14 mmol/L    Glucose 129 (H) 70 - 99 mg/dL    Urea Nitrogen 19 7 - 30 mg/dL    Creatinine  0.84 0.52 - 1.04 mg/dL    GFR Estimate 68 >60 mL/min/1.7m2    GFR Estimate If Black 83 >60 mL/min/1.7m2    Calcium 8.4 (L) 8.5 - 10.1 mg/dL    Bilirubin Total 0.7 0.2 - 1.3 mg/dL    Albumin 3.2 (L) 3.4 - 5.0 g/dL    Protein Total 6.6 (L) 6.8 - 8.8 g/dL    Alkaline Phosphatase 92 40 - 150 U/L    ALT 14 0 - 50 U/L    AST 27 0 - 45 U/L   Lactic acid whole blood   Result Value Ref Range    Lactic Acid 0.7 0.7 - 2.1 mmol/L   Blood culture   Result Value Ref Range    Specimen Description Blood Right Arm     Special Requests Aerobic and anaerobic bottles received     Culture Micro No growth after 3 hours     Micro Report Status Pending        CT ABDOMEN AND PELVIS WITH CONTRAST 7/1/2017 7:42 PM      HISTORY: Abdominal pain, fullness.     COMPARISON: None.     TECHNIQUE: Volumetric helical acquisition of CT images from the lung  bases through the symphysis pubis after the administration of 100 mL  Isovue 370  intravenous contrast. Radiation dose for this scan was  reduced using automated exposure control, adjustment of the mA and/or  kV according to patient size, or iterative reconstruction technique.     FINDINGS: There is a 25.1 x 20.4 x 16.5 cm cystic mass in the abdomen  and pelvis. There is a suggestion of more complex partially enhancing  9.8 x 12.7 x 9.5 cm. This presumably is arising from the right  ovary/adnexa given the mass effect on the adjacent uterus. A normal  right or left ovary is not well seen. Small amount of free fluid.  Appendix unremarkable. No bowel obstruction. No free air. No  hydronephrosis. Kidneys, adrenal glands, spleen, and liver demonstrate  no worrisome focal lesion. Some increased soft tissue attenuation is  in the omentum, particularly the left upper quadrant. This could be  related to the ascites but omental infiltration is not excluded. Lung  base is clear of acute infiltrates or masses. No frankly destructive  bony lesions.         IMPRESSION: Large cystic and solid lesion probably  arising from the  right ovary/adnexa. Benign and malignant lesions are considerations in  the differential. Small amount of pelvic free fluid which is  nonspecific.      CHELSEA VINES MD    US Pelvic Complete w Transvaginal & Abd/Pel Duplex Limited    Narrative    US PELVIS COMPLETE WITH TRANSVAGINAL AND DOPPLER LIMITED    7/3/2017  12:18 AM     HISTORY: Pelvic pain. Status post right oophorectomy.    TECHNIQUE: Transvaginal images were performed to better evaluate the  patient's uterus, ovaries and endometrial stripe. Spectral Doppler and  wave form analysis was also performed to evaluate blood flow to the  ovaries.    COMPARISON: CT of the abdomen and pelvis performed 7/1/2017.    FINDINGS: The uterus is measured at 8.4 x 6.2 x 4.1 cm. No fibroids  are evident. Endometrial stripe could not be visualized. The right  ovary is not seen, consistent with history of recent right  oophorectomy. The left ovary is unremarkable. There is a large complex  pelvic mass, difficult to measure due to its large size. The origin of  this mass could not be identified on this exam and this finding was  better seen on CT. No free pelvic fluid is present. Spectral Doppler  and waveform analysis demonstrate blood flow to the left ovary.      Impression    IMPRESSION:  1. Large complex pelvic mass is difficult to measure due to its large  size, and the origin of this mass is not identified on this exam. This  finding was better visualized on the recent CT scan.   2. There is a large amount of slightly complex free fluid in the  pelvis.    NIRALI AMBRIZ MD   Incision and drainage    Narrative    Thelma Lopez MD     7/3/2017  1:04 AM  Incision + drainage  Date/Time: 7/3/2017 1:03 AM  Performed by: THELMA LOPEZ  Authorized by: THELMA LOPEZ   Consent: Verbal consent obtained.  Risks and benefits: risks, benefits and alternatives were discussed  Consent given by: patient  Time out: Immediately prior to procedure a  "\"time out\" was called to verify   the correct patient, procedure, equipment, support staff and site/side   marked as required.  Type: abscess  Body area: lower extremity  Location details: left leg  Anesthesia: local infiltration    Anesthesia:  Anesthesia: local infiltration  Local Anesthetic: lidocaine 1% with epinephrine   Anesthetic total: 5 mL  Sedation:  Patient sedated: no    Risk factor: coagulopathy  Scalpel size: 11  Incision type: single straight  Incision depth: dermal  Complexity: simple  Drainage: serosanguinous  Drainage amount: scant  Wound treatment: wound left open  Packing material: 1/4 in iodoform gauze  Patient tolerance: Patient tolerated the procedure well with no immediate   complications     UA with Microscopic   Result Value Ref Range    Color Urine Yellow     Appearance Urine Slightly Cloudy     Glucose Urine Negative NEG mg/dL    Bilirubin Urine Negative NEG    Ketones Urine 40 (A) NEG mg/dL    Specific Gravity Urine 1.020 1.003 - 1.035    Blood Urine Trace (A) NEG    pH Urine 5.0 5.0 - 7.0 pH    Protein Albumin Urine 30 (A) NEG mg/dL    Urobilinogen mg/dL Normal 0.0 - 2.0 mg/dL    Nitrite Urine Negative NEG    Leukocyte Esterase Urine Negative NEG    Source Clean catch urine     WBC Urine 2 0 - 2 /HPF    RBC Urine 5 (H) 0 - 2 /HPF    Bacteria Urine Moderate (A) NEG /HPF    Mucous Urine Present (A) NEG /LPF    Amorphous Crystals Few (A) NEG /HPF   Glucose by meter   Result Value Ref Range    Glucose 136 (H) 70 - 99 mg/dL   Glucose by meter   Result Value Ref Range    Glucose 116 (H) 70 - 99 mg/dL   Basic metabolic panel   Result Value Ref Range    Sodium 140 133 - 144 mmol/L    Potassium 3.9 3.4 - 5.3 mmol/L    Chloride 106 94 - 109 mmol/L    Carbon Dioxide 26 20 - 32 mmol/L    Anion Gap 8 3 - 14 mmol/L    Glucose 112 (H) 70 - 99 mg/dL    Urea Nitrogen 17 7 - 30 mg/dL    Creatinine 0.88 0.52 - 1.04 mg/dL    GFR Estimate 65 >60 mL/min/1.7m2    GFR Estimate If Black 78 >60 mL/min/1.7m2    " Calcium 8.5 8.5 - 10.1 mg/dL   CBC with platelets differential   Result Value Ref Range    WBC 8.7 4.0 - 11.0 10e9/L    RBC Count 3.18 (L) 3.8 - 5.2 10e12/L    Hemoglobin 8.4 (L) 11.7 - 15.7 g/dL    Hematocrit 27.0 (L) 35.0 - 47.0 %    MCV 85 78 - 100 fl    MCH 26.4 (L) 26.5 - 33.0 pg    MCHC 31.1 (L) 31.5 - 36.5 g/dL    RDW 14.6 10.0 - 15.0 %    Platelet Count 296 150 - 450 10e9/L    Diff Method Automated Method     % Neutrophils 71.5 %    % Lymphocytes 16.7 %    % Monocytes 10.8 %    % Eosinophils 0.6 %    % Basophils 0.2 %    % Immature Granulocytes 0.2 %    Nucleated RBCs 0 0 /100    Absolute Neutrophil 6.2 1.6 - 8.3 10e9/L    Absolute Lymphocytes 1.5 0.8 - 5.3 10e9/L    Absolute Monocytes 0.9 0.0 - 1.3 10e9/L    Absolute Eosinophils 0.1 0.0 - 0.7 10e9/L    Absolute Basophils 0.0 0.0 - 0.2 10e9/L    Abs Immature Granulocytes 0.0 0 - 0.4 10e9/L    Absolute Nucleated RBC 0.0              Assessment and Plan:   Assessment: 62 year old female with abdominal pain and distension with 25.1x20.4x16.5[DF1.1] cm[KB1.2] pelvic mass found on CT admitted for workup[DF1.1] and further management of pelvic mass.[KB1.2]      Reccomendations[KB1.5]:[DF1.1]  # Pelvic mass: Imaging highly concerning for gyn origin. CA-125 pending. DDx benign, borderline, and malignant masses. Given appearance on imaging and patient symptomatology, recommend surgical management for treatment definitive diagnosis.   - Focus on optimizing patient's comorbid conditions in light of pending surgery[KB1.5]     #LLE DVT: May be 2/2 venous compression due to large pelvic mass[KB1.6] vs hypercoagulability due to malignancy[DT1.1].   - Okay to continue Xarelto for now, however w[KB1.6]ill need to hold x24 hours prior to surgical management[DT1.1]    ID: s/p dog bite 6/27 with resulting lower extremity abscess. Resolving on IV antibiotics.[DT1.2]    Gyn Onc team will continue to follow daily, please do not hesitate to contact us with any questions or  concerns.[KB1.5]     Corry Segura[KB1.7], MD  Ob/Gyn PGY-1  Gyn Onc pager: [phone][KB1.8]  07/03/17 12:22 PM[KB1.7]        I have seen and examined the patient.  I have reviewed and edited Dr. Segura's note above.[DT1.1]  Plan to go to the OR[DT1.3] Friday 7/7/17 for[DT1.4] exploratory laparotomy, bilateral salpingo-oophorectomy; possible cancer staging including hysterectomy, pelvic/para-aortic lymphadenectomy, omentectomy, peritoneal biopsies.[DT1.3]  Will need IVC filter placed prior to surgery.  Will need to hold anticoagulation x24 hours prior to surgery.[DT1.4]    Serena Cole  7/3/2017  3:36 PM[DT1.5]           Revision History        User Key Date/Time User Provider Type Action    > DT1.2 7/3/2017  3:42 PM Serena Cole MD Physician Addend     DT1.4 7/3/2017  3:40 PM Serena Cole MD Physician Addend     DT1.5 7/3/2017  3:36 PM Serena Cole MD Physician Sign     DT1.3 7/3/2017  3:33 PM Serena Cole MD Physician      DT1.1 7/3/2017  3:22 PM Serena Cole MD Physician Incomplete Revision     [N/A] 7/3/2017  2:15 PM Naty Pinzon MD Resident Sign     KB1.6 7/3/2017  1:30 PM Corry Segura MD Resident Sign     KB1.5 7/3/2017 12:32 PM Corry Segura MD Resident Sign     KB1.7 7/3/2017 12:23 PM Corry Segura MD Resident Share     KB1.8 7/3/2017 12:15 PM Corry Segura MD Resident      KB1.4 7/3/2017 12:00 PM Corry Segura MD Resident Share     KB1.3 7/3/2017 11:54 AM Corry Segura MD Resident      KB1.2 7/3/2017 11:48 AM Corry Segura MD Resident      KB1.1 7/3/2017 11:28 AM Corry Segura MD Resident Share     DF1.1 7/3/2017 11:23 AM Anusha Kearns Medical Student Share                     Discharge Summaries      Discharge Summaries by Corry Segura MD at 7/10/2017  8:38 AM     Author:  Corry Segura MD Service:  Gyn/Onc Author Type:  Resident    Filed:  7/12/2017 11:40 AM Date of Service:  7/10/2017  8:38 AM Creation Time:  7/10/2017   7:56 AM    Status:  Cosign Needed :  Corry Segura MD (Resident)    Cosign Required:  Yes             Gynecologic Oncology Discharge Summary    Hafsa Corona  [id number]    Admit Date: 7/2/2017  Discharge Date:[DF1.1] 7/11/2017[KB1.1]  Admitting Provider: Dr. Cole  Discharge Provider:[DF1.1] Dr. 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  -[KB1.2] LLE DVT  - T2DM  - LLE cellulitis 2/2 dog bite  - Acute on chronic anemia  - HTN[KB1.1]    Patient Active Problem List   Diagnosis     Attention deficit disorder     PANIC DISORDER      VASOMOTOR RHINITIS     Chronic Maxillary Sinusitis     Tachycardia     Type 2 diabetes mellitus without complication (H)     HYPERLIPIDEMIA LDL GOAL <100     Allergic rhinitis due to other allergen     BMI > 35     Essential hypertension     Lumbago     Major depressive disorder, recurrent episode, mild (H)     Long-term (current) use of anticoagulants [Z79.01]     Deep vein thrombosis (DVT) (H) [I82.409]     Pelvic mass in female     S/P laparotomy[KB1.3]       Procedures: XL, HELENE, LSO, omentectomy, evacuation of abdominal fluid, PPALND    Prior to Admission Medications:  Prescriptions Prior to Admission   Medication Sig Dispense Refill Last Dose     Multiple Vitamins-Minerals (MULTIVITAMIN ADULT PO) Take 1 tablet by mouth daily   7/2/2017 at unknown     vitamin B complex with vitamin C (VITAMIN  B COMPLEX) TABS tablet Take 1 tablet by mouth daily   Past Week at Unknown time     Lisdexamfetamine Dimesylate (VYVANSE PO) Take 50 mg by mouth daily   hasnt started yet at hasnt started yet     rivaroxaban ANTICOAGULANT (XARELTO) 20 MG TABS tablet Take 1 tablet (20 mg) by mouth daily (with dinner) 30 tablet 3 7/1/2017 at Unknown time     Digestive Enzymes (DIGESTIVE ENZYME PO) Take 1 capsule by mouth as needed     Past Week at Unknown time     CALCIUM-MAGNESIUM-VITAMIN D PO Take 2 tablets by mouth daily   7/2/2017 at Unknown time     Probiotic Product (PRO-BIOTIC BLEND PO) Take 1 capsule by mouth daily Enzymatic Therapy-probiotic pearls   7/2/2017 at Unknown time     atorvastatin (LIPITOR) 80 MG tablet Take 1 tablet (80 mg) by mouth daily 90 tablet PRN 7/1/2017 at Unknown time     lisinopril (PRINIVIL/ZESTRIL) 10 MG tablet Take 1 tablet (10 mg) by mouth daily 90 tablet PRN 7/2/2017 at Unknown time     metFORMIN (GLUCOPHAGE-XR) 500 MG 24 hr tablet Take two tabs by mouth once daily with evening meal. 180 tablet PRN 7/1/2017 at Unknown time     escitalopram (LEXAPRO) 20 MG tablet Take 1 tablet (20 mg) by mouth daily 30 tablet PRN 7/2/2017 at unknown     fluticasone (FLONASE) 50 MCG/ACT nasal spray Spray 2 sprays into both nostrils daily 48 g 1 Past Week at Unknown time     Cholecalciferol (VITAMIN D) 1000 UNIT capsule Take 2,000 Units by mouth daily    7/1/2017 at Unknown time     traMADol (ULTRAM) 50 MG tablet Take 1 tablet (50 mg) by mouth every 6 hours as needed for pain maximum 4 tablet(s) per day 20 tablet 0 hasnt started yet at hasnt started yet     order for DME Compression stockings 20-30 mm Hg. To be wear when directed by Hematology team and while awake for the first two years post clot. 1 each 0 Taking     order for DME Equipment being ordered: blood pressure monitor 1 Units 0 Taking     EPINEPHrine (EPIPEN) 0.3 MG/0.3ML injection Inject 0.3 mLs (0.3 mg) into the muscle once as needed for anaphylaxis 1 each PRN never used at never used     albuterol (ALBUTEROL) 108 (90 BASE) MCG/ACT inhaler Inhale 2 puffs into the lungs 4 times daily as needed for shortness of breath / dyspnea 1 Inhaler PRN More than a month at Unknown time       Discharge Medications:[DF1.1]   Hafsa Corona   Home Medication Instructions AMIE:[id number]    Printed on:07/11/17 4282   Medication Information                      acetaminophen  (TYLENOL) 325 MG tablet  Take 2 tablets (650 mg) by mouth every 6 hours as needed for mild pain             albuterol (ALBUTEROL) 108 (90 BASE) MCG/ACT inhaler  Inhale 2 puffs into the lungs 4 times daily as needed for shortness of breath / dyspnea             atorvastatin (LIPITOR) 80 MG tablet  Take 1 tablet (80 mg) by mouth daily             CALCIUM-MAGNESIUM-VITAMIN D PO  Take 2 tablets by mouth daily             Cholecalciferol (VITAMIN D) 1000 UNIT capsule  Take 2,000 Units by mouth daily              Digestive Enzymes (DIGESTIVE ENZYME PO)  Take 1 capsule by mouth as needed              EPINEPHrine (EPIPEN) 0.3 MG/0.3ML injection  Inject 0.3 mLs (0.3 mg) into the muscle once as needed for anaphylaxis             escitalopram (LEXAPRO) 20 MG tablet  Take 1 tablet (20 mg) by mouth daily             fluticasone (FLONASE) 50 MCG/ACT nasal spray  Spray 2 sprays into both nostrils daily             HYDROmorphone (DILAUDID) 4 MG tablet  Take 1-1.5 tablets (4-6 mg) by mouth every 3 hours as needed for moderate to severe pain             Lisdexamfetamine Dimesylate (VYVANSE PO)  Take 50 mg by mouth daily             lisinopril (PRINIVIL/ZESTRIL) 10 MG tablet  Take 1 tablet (10 mg) by mouth daily             metFORMIN (GLUCOPHAGE-XR) 500 MG 24 hr tablet  Take two tabs by mouth once daily with evening meal.             Multiple Vitamins-Minerals (MULTIVITAMIN ADULT PO)  Take 1 tablet by mouth daily             order for DME  Equipment being ordered: blood pressure monitor             order for DME  Compression stockings 20-30 mm Hg. To be wear when directed by Hematology team and while awake for the first two years post clot.             polyethylene glycol (MIRALAX/GLYCOLAX) Packet  Take 17 g by mouth daily             Probiotic Product (PRO-BIOTIC BLEND PO)  Take 1 capsule by mouth daily Enzymatic Therapy-probiotic pearls             rivaroxaban ANTICOAGULANT (XARELTO) 20 MG TABS tablet  Take 1 tablet (20 mg) by mouth  daily (with dinner)             senna-docusate (SENOKOT-S;PERICOLACE) 8.6-50 MG per tablet  Take 2 tablets by mouth 2 times daily Start with 1 tablet PO BID, If no bowel movement in 24 hours, increase to 2 tablets PO BID.  Hold for loose stools.             vitamin B complex with vitamin C (VITAMIN  B COMPLEX) TABS tablet  Take 1 tablet by mouth daily[KB1.3]                  Consultations:  Medicine primary HD[DF1.1]#[KB1.1]1-HD[DF1.1]#6[KB1.1]  P[DF1.1]hysical therapy[KB1.1]  Social work   Wound care[DF1.1] nurse  Lymphedema nurse[KB1.1]    Brief History of Illness:  Hafsa Corona is a 62 year old female who presented on 7/3/17 with abdominal pain and distention, history of left LE DVT diagnosed in early June 2017, and recent dog bite on upper left thigh complicated by cellulitis and abscess formation. Shortly before the current admission she was found to have a large pelvic mass with  of 1187[DF1.1]. S[KB1.1]he has a history of RSO in 2007. She was followed and managed by family medicine from HD1- HD[DF1.1]6[KB1.1].[DF1.1] Pelvic mass was[KB1.1] removed by Gyn Onc on HD6. She remained on the gyn onc service for her post operative care.    Hospital Course:  Dz: Stage 1C borderline ovarian tumor    - Preoperative diagnosis was left ovarian mass confirmed by CT.  Frozen section at the time of the surgery showed borderline ovarian tumor[DF1.1] that had ruptured prior to surgery.[KB1.1]  Final pathology is pending at the time of discharge.  She will follow-up postoperatively for a care plan.  FEN:   - She was maintained on IVF until POD#1, when her diet was slowly advanced.  By discharge, she was tolerating a regular diet without nausea and vomiting and able to maintain her hydration without IVF supplementation.  Pain:   -[DF1.1] Postoperative pain was initially managed with bupivacaine epidural, IV dilaudid, and Tylenol. Dilaudid PCA was started on POD#0 given poor pain control with the above interventions.  Gabapentin was added on POD#2. On POD#3 patient[KB1.1] transitioned to PO pain medications[DF1.1].[KB1.1] Her pain was well controlled on this and she was discharged home with these medications.  CV:   - She has a history of[DF1.1] HTN &[KB1.1] HLD managed with[DF1.1] lisinopril and[KB1.1] pravastatin[DF1.1] respectively[KB1.1], th[DF1.1]missy[KB1.1] w[DF1.1]ere[KB1.1] continued throughout admission.[DF1.1] She had mild tachycardia in the immediate postoperative period, this resolved with fluids. S[KB1.1]he had no acute CV issues.  PULM:   - She has no history of pulmonary issues[DF1.1], although pt was noted to have shortness of breath and wheezing on admission[KB1.1] that was managed with prn albuterol. CXR was unremarkable on admission. She was given O2 supplementation in the postoperative period. On POD#3 pt underwent ambulating oxygen assessment with PT and was noted to desaturate below 90% on room air. She will be discharged to the TCU with supplemental oxygen and will follow up as an outpatient for further workup and management.[KB1.4] She was encouraged to use her bedside IS while in house  HEME:   - Her preoperative Hgb was 9.4.  Her hgb was 9.5 postoperatively[DF1.1] and stabilized at 8.4. Pt has known left lower extremity DVT diagnosed on 6/5/17, managed with Xarelto per hematology.[KB1.4] This was discontinued 24 hours before[DF1.1] the[KB1.4] procedure and restarted on POD[DF1.1]#[KB1.4]2[DF1.1] after epidural removal[KB1.4].[DF1.1] She will follow up with her hematologist for further management of her DVT. IR was consulted preoperatively for IVC filter placement, however pt was not a candidate given venous compression by the tumor.[KB1.4]   GI:   -[DF1.1] S[KB1.4]he was made NPO prior to the procedure.  On POD#0, her diet was advanced to clear liquids and then advanced slowly as tolerated.  At the time of discharge, she was tolerating a regular diet without nausea and vomiting.  She was started on  miralax and senna-docusate post op will be discharged with a bowel regimen to prevent constipation in the postoperative period.  She had no acute GI issues while in house.  :    - A rand catheter was placed at the time of the surgery.  Once ambulating unassisted, the rand catheter was removed.  Prior to discharge, the patient was voiding spontaneously without difficulty.[DF1.1] Pt was noted to have urinary incontinence on POD#2. UA was obtained, which was negative for infection, and PVR was 0 mL. She had no acute  issues while in house.[KB1.4]   ID:   -[DF1.1] Pt diagnosed with left thigh cellulitis with abscess formation 2/2 dog bite on admission. She underwent I&D in the ED. This was initially managed with IV Zosyn and Vancomycin per family medicine. She was transitioned to PO Augmentin for a total of 7 day course. This was completed on 7/10/17.[KB1.4] The wound was packed and dressed in clean bandages throughout admission[DF1.1] and she was seen by [facility] nurse. Pt was initially febrile on presentation to the ED, but this resolved with initiation of IV antibiotics.[KB1.4]   ENDO:   - History of T2DM,[DF1.1] managed with Metformin. T[KB1.4]his was discontinued throughout the admission. She was put on medium[DF1.1] SSI[KB1.4] for management of her blood sugars during her hospital sta[DF1.1]y with good control. Metformin will be restarted on discharge.   MSK:   - Pt developed bilateral lower extremity edema in the postoperative period. Noted to be net positive 3L during the admission. Lymphedema nurse was consulted for lower extremity wraps for patient comfort.[KB1.4]   PSYCH/NEURO:   - History of depression/anxiety, home lexapro was continued throughout admission.[DF1.1] She was seen by health psychology during her admission.[KB1.4]   PPX:    - Xarelto was continued throughout admission[DF1.1] as noted above.[KB1.4] She was given[DF1.1] RLE[KB1.4] SCD[DF1.1] and[KB1.4] IS during her hospital course.  She  tolerated these prophylactic interventions without incident.      Discharge Instructions and Follow up:  Ms. Hafsa Corona was discharged from the hospital with follow up[DF1.1] with [KB1.4] Edmund[KB1.5] in 2 weeks to discuss final pathology and further treatment planning[KB1.4].    Discharge Diet:[DF1.1] Regular[KB1.5]  Discharge Activity:[DF1.1] Activity as tolerated[KB1.5]  Discharge Follow up:[DF1.1] In 2 weeks as above[KB1.5]    Discharge Disposition:[DF1.1]  Discharged to rehabilitation facility[KB1.5]    Discharge Staff:[DF1.1] Dr. Pack[KB1.5]    Corry Segura[KB1.6], MD  Ob/gyn PGY-1[KB1.5]  07/11/17 9:50 AM[KB1.6]        Revision History        User Key Date/Time User Provider Type Action    > [N/A] 7/12/2017 11:40 AM Corry Segura MD Resident Sign     KB1.6 7/11/2017  9:50 AM Corry Segura MD Resident Share     KB1.5 7/11/2017  9:49 AM Corry Segura MD Resident      KB1.4 7/11/2017  9:48 AM Corry Segura MD Resident Share     KB1.3 7/11/2017  9:33 AM Corry Segura MD Resident Share     KB1.1 7/11/2017  9:21 AM Corry Segura MD Resident      [N/A] 7/11/2017  8:06 AM Anusha Kearns Medical Student Share     KB1.2 7/10/2017 10:10 AM Corry Segura MD Resident Share     DF1.1 7/10/2017  8:38 AM Anusha Kearns Medical Student Share                     Consult Notes      Consults by Alecia Mina, PhD LP at 7/10/2017 11:00 AM     Author:  Alecia Mina, PhD LP Service:  Health Psychology Author Type:  Psychologist    Filed:  7/11/2017  7:59 AM Date of Service:  7/10/2017 11:00 AM Creation Time:  7/10/2017 10:58 AM    Status:  Signed :  Alecia Mina, PhD LP (Psychologist)             Health Psychology                  Clinic    Department of Medicine  Alecia Mina, Ph.D., L.P. [phone]                          [facility]  [facility] Casimiro Renae, Ph.D.,  L.P. [phone]                 3rd [facility] Mail Code 660 [id number]  "TANYA Linton, Ph.D., RIAZ.ИРИНА.P.P., L.P. [phone]     [address] [place] [address]. Fiorella aHn, Ph.D., L.P. [phone]            [place] [address]           Christine Kebede, Ph.D., L.P. [phone]     Inpatient Health Psychology Consultation*    DATE OF SERVICE:  07/10/2017    REFERRAL SOURCE:  Eli Vick MD, [facility] team, [address], [facility], [place].    Clinical Information:[DB1.1]  Met with patient per her request to provide emotional support  following her surgery Friday. She reports that once pain got under control that things have felt much better. Notes that it felt as if she needed to be a very strong advocate for herself re the pain management as the recommended approaches were not working well for her, including epidural and then shift to oral medication that was not as effective as needed. Now feeling that pain is under control and has an appetite, able to be up and active, walking in the halls, working with PT. Feels that she is doing well emotionally in general, allowing herself to \"just be\" and let her body rest and recover as much as possible. Feels very well supported by providers and staff.   We talked about the importance of putting attention on what she can and cannot control and making the choice to use her energy for the things that she has the possibility of changing. She talked about the shifts in dynamics with her siblings and how she appreciates that a crisis like this can provide an opportunity for these kinds of shifts. Working with SW on TCU placement. Appreciative of recommendation for referral to community provider. Asked for my contact information and this was provided. Affect euthymic.[DB1.2]  Assessment:[DB1.1] Doing very well emotionally in context of recent diagnosis of mass, surgery, uncertainty of specific diagnosis, and history of anxiety and depression. Appears remarkably calm " and grounded, staying in present, appreciative of support and care. Asking clear questions to gain information needed to continue managing emotional reactions effectively.[DB1.3]  Diagnosis:    1.  Dysthymic disorder (F34.1) (based on information from psychiatrist found in Care Everywhere).  2.  Major depressive disorder, single episode, moderate (F32.1).    3.  Generalized anxiety disorder (F41.1).  4.  Attention deficit disorder without mention of hyperactivity[DB1.1] ([DB1.3]by history[DB1.1])[DB1.3].  5.  History of panic disorder.   Recommendations/Plan:[DB1.1] Ms Corona will make contact with community provider to establish care following discharge from community TCU. No other specific recommendations from a psychological perspective prior to discharge.[DB1.3]  Time Spent with Patient: 57 minutes  Service Provided: Individual psychotherapy   Provider: Alecia Mina, Ph.D, BENJAMIN   Provider Pager: [phone]   Provider Phone: [phone]    Alecia Mina PhD, LP[DB1.1]            Revision History        User Key Date/Time User Provider Type Action    > DB1.3 7/11/2017  7:59 AM Alecia Mina PhD BENJAMIN Psychologist Sign     DB1.2 7/10/2017 12:01 PM Alecia Mina, PhD BENJAMIN Psychologist      DB1.1 7/10/2017 10:58 AM Alecia Mina, PhD BENJAMIN Psychologist             Consults by Alecia Mina PhD LP at 7/6/2017 12:00 AM     Author:  Alecia Mina PhD BENJAMIN Service:  Health Psychology Author Type:  Psychologist    Filed:  7/10/2017  9:11 AM Date of Service:  7/6/2017 12:00 AM Creation Time:  7/8/2017  7:36 AM    Status:  Signed :  Alecia Mina, PhD BENJAMIN (Psychologist)             Health Psychology                  Clinic    Department of Medicine  Alecia Mina, Ph.D., L.P. [phone]                          Clinics and Surgery Center  [facility] Casimiro Renae, Ph.D.,  L.P. [phone]                 3rd [facility] Mail Code 741   Darwin Linton, Ph.D., A.B.P.P., L.P. [phone]     " [facility].  [address] [place] [address] Fiorella Han, Ph.D., L.P. [phone]            [place]   [address]  [place] [address]           Christine Kebede, Ph.D., L.P. [phone]     Inpatient Health Psychology Consultation*[DB1.1]    DATE OF SERVICE:  07/06/2017    REFERRAL SOURCE:  Eli Vick MD, [facility]ME1.1[facility] team[DB1.1], [address], [facility].    REASON FOR REFERRAL:  Hafsa Corona is a 62-year-old woman currently hospitalized and awaiting surgery to remove a pelvic mass discovered on 07/01/2017.  Ms. Corona has a significant history of anxiety and depression.  She requested this contact to gain additional emotional support in preparation for her surgery tomorrow.  This evaluation included information gathering directly from Ms. Corona as well as from documentation in Care Everywhere a[ME1.1]nd[DB1.1] chart review.      HISTORY OF PRESENT ILLNESS:  Ms. Corona describes that she has a history of anxiety and depression that goes back to her teen years.  She has been treated over the years both with medication and psychotherapy.  Most recently she has not been seeing a psychotherapist because of a variety of reasons including insurance coverage.  She has continued to see a psychiatric provider[ME1.1] for medication management[DB1.1] with whom she has a long relationship.  Most recently she has been using escitalopram 20 mg per day to manage symptoms of depression and anxiety and Vyvanse 50 mg per day to address a diagnosis of attention deficit disorder without hyperactivity.  Ms. Corona also reports a history of frequent panic episodes between 1999 and 2004.  More recently panic symptoms have been much less frequent.     Ms. Corona does report that very early Wednesday morning, approximately 3 a.m., that she had a \"melt down\" that sounds as if it was a panic episode.  She asked for emotional support and was able to have contact with the  " on call; she found this contact remarkably helpful and has felt much calmer, more grounded, and not at all frightened since that conversation.     Ms. Corona also has a history of depressive symptoms.  I note that this is reflected in Care Everywhere by her treating psychiatrist as a primary diagnosis of dysthymic disorder.  However, Ms. Corona last saw that provider several months ago.  Ms. Corona tells me that over the past month[ME1.1]s[DB1.1] her feelings of depression have been more intense and overwhelming.  She has been experiencing passive suicidal ideation at a much higher and more frequent level than she is used to.  She does acknowledge a very high level of stressors over the past year including a difficult work place environment, financial difficulties, as well as other stressors within her personal life.  She is very clear that she has never formulated a plan for self harm.  Currently, she is expressing feelings of some guilt over the suicidal ideation[ME1.1] while now focused on[DB1.1] how elieser her life feels to her in her current situation.  I also note her ability to use her intellect and humor in this situation, as she notes the high level of irony involved in her awareness of recent suicidal ideation and a current, clear determination to get excellent medical treatment and be as healthy as possible.        Ms. Corona is sleeping well during this hospitalization so far.  She states that once her physical pain was well managed that her appetite returned and she is trying to eat a healthy diet.  She describes a very strong support network of friends and family that is very present for her since her hospitalization.  She has 2 of her closest friends planning to spend time with her this evening.  She also plans to speak with some of her siblings and discuss what she would have put in her advance care directive had she made one.  She did ask about this and we agreed that it was probably too late at this  point to organize a notary to be present when the friend that she would like to appoint as her healthcare agent could be present prior to surgery.  She also plans to speak with her siblings about these issues so that they will know what her wishes will have been should they be asked to weigh in.  We did agree that I would provide Ms. Corona with a POLST form for her to complete and that her physician will sign and have available should it be necessary.      Ms. Corona was also focused on a goal of getting possible options for referral to a psychologist or other psychotherapist in the community.  She notes that she has not been seeing a therapist regularly for some time and has found this very helpful in the past.    Ms. Corona was very appreciative of this contact with Health Psychology and asked if I would be available tomorrow after surgery.  She is aware that she may not be able to be very interactive following surgery.  At the same time, she is also thinking about the possibility that she may be told shortly after she leaves the recovery room if the mass was benign or malignant and would like to have support available should she be told that she will need to go into planning for cancer treatment.      SOCIAL HISTORY:  Ms. Corona is the second child and oldest daughter of 5, with 1 older brother, 2 younger sisters, and a younger brother.  Both of her parents are .  She was born in [place] but grew up in [place], [place], a town of 80,000 people about [place] hour and a half from [place].  She attended the [employer][ME1.1],[DB1.1] [employer] with a focus in graphic arts.  She moved to the [facility] following graduation to take a job as an .  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  She currently works for [employer] in [place].  She was quite disappointed to miss an  interview for a long sought after job with the [facility] that was scheduled for yesterday.  Ms. Corona has many close friends that she considers to be important in her [place] of support.  She is close with her siblings.  She tells me that her younger brother has been living with refractory depression for many years and that she is pleased that he has finally found a treatment that appears to be helpful.  Ms. Corona has never been , but has had several important romantic relationships.  She is currently involved with a man who is very supportive but unfortunately does not live in the [place] area and is not able to be physically present during this hospitalization, although he is closely in touch by phone.  Ms. Corona grew up in a Jain family but has found benefit from many other spiritual perspectives[ME1.1] as well[DB1.1].      MEDICAL HISTORY:  Ms. Corona's medical record indicates a history of type 2 diabetes as well as a period of time that she was experiencing tachycardia[ME1.1]; this appears to have been the same time period in which she was experiencing panic episodes[DB1.1].[ME1.1] She tells me about a cardiac anomaly that was discovered during this time.[DB1.1]  Please see her [facility] [place], electronic medical record for more complete information on her medical history, current admission and status, and all medications.      PSYCHIATRIC HISTORY:  As above in HPI.  Her brother is reported to have lived with refractory depression with possible psychotic symptoms over many years.  Her parents are also noted to both have had some mental health issues, as well as grandparents and one of her sisters.  There is documentation within Care Everywhere of medication management by psychiatry.  No other significant psychiatric history was available at the time of this evaluation.    BEHAVIORAL IMPRESSIONS:  Ms. Corona presented for this evaluation in her room on [address].   She was alert and oriented.  She reported her mood as somewhat distressed and anxious but much calmer and not frightened than she had been several days ago.  She exhibited a varied and appropriate affect that was primarily euthymic.  Speech was clear, coherent, and goal oriented.  Use of language and use of vocabulary and syntax appears to reflect an above average intellect.  Insight and judgment appear to be good.  She exhibited no evidence of distortions in thought or perception.     IMPRESSIONS AND PLAN:  Hafsa Corona is a 62-year-old woman who was found to have a large pelvic mass last weekend after a 1-month history of bloating and distention.  She is awaiting surgery tomorrow.  She is aware that there is a possibility of malignancy.  She has a history of depression, anxiety, and attention deficit disorder that have been treated consistently with medication.  She has had many episodes of psychotherapy over her lifetime that she has found helpful.  She asked for this contact to gain additional emotional support prior to her surgery.  She appears to be doing an excellent job of accessing resources that would be helpful and using them effectively.  She has found contact with spiritual health very useful.  Today we focused on a compassion practice that I encouraged her to use throughout the evening and morning tomorrow as well as whenever she finds herself waiting during the presurgical and postsurgical process.  She appeared to find this tool very useful and made notes to herself indicating that she intended to use it.  She tells me that she finds psychotherapy that includes both focus on psychological issues and spiritual resources very helpful.  We discussed what she is looking for in a psychotherapist to establish care with, and we will provide her with several options for potential providers that she can pursue following discharge from the hospital.  She also asked for support to be available following surgery  tomorrow, knowing that she may get information about whether she needs to plan for cancer treatment.  We made a plan that I will track her progress in surgery and try to see her in the late afternoon if she is available.  If that is not possible, she tells me that her close friend, Thelma, will be arriving at 5:30 p.m. to provide support and that she feels comfortable with this plan.     DIAGNOSES:    1.  Dysthymic disorder (F34.1) (based on information from psychiatrist found in Care Everywhere).  2.  Major depressive disorder, single episode, moderate (F32.1).    3.  Generalized anxiety disorder (F41.1).  4.  Attention deficit disorder without mention of hyperactivity by history.  5.  History of panic disorder.       Alecia Mina, PhD, LP[ME1.1]         *In accordance with the Rules of the [place] Board of Psychology, it is noted that psychological descriptions and scientific procedures underlying psychological evaluations have limitations.  Absolute predictions cannot be made based on information in this report.[DB1.1]    D:  07/06/2017 20:12 T:  07/08/2017 08:03  Document:  [id number] DB\DK[ME1.1]     Revision History        User Key Date/Time User Provider Type Action    > DB1.1 7/10/2017  9:11 AM Alecia Mina, PhD LP Psychologist Sign     ME1.1 7/8/2017  7:37 AM Kelli Amaral. Non-Clinical Sign            Consults by Alecia Mina, PhD LP at 7/6/2017  4:32 PM     Author:  Alecia Mina, PhD LP Service:  Health Psychology Author Type:  Psychologist    Filed:  7/7/2017  9:26 AM Date of Service:  7/6/2017  4:32 PM Creation Time:  7/6/2017  4:30 PM    Status:  Addendum :  Alecia Mina, PhD LP (Psychologist)     Consult Orders:    1. Psychology Adult IP Consult [[id number]] ordered by Eli Vick MD at 07/06/17 1342                  Health Psychology                  Clinic    Department of Medicine  Alecia Mina, Ph.D., L.P. [phone]                          Clinics and Surgery  "[facility] Casimiro Renae, Ph.D.,  L.P. [phone]                 3rd Floor  [place] Mail Code 748   Darwin Linton, Ph.D., A.B.P.P., L.P. [phone] [address] Fiorella Han, Ph.D., L.P. [phone]            [place] [address]           Christine Kebede, Ph.D., L.P. [phone]     Inpatient Health Psychology Consultation*    Consult request received, chart reviewed.  Ms Corona reports a longstanding history of depression and anxiety. Panic episodes prominent between 199[DB1.1]9[DB1.2]-2004, not frequent since then.[DB1.1] Reports a \"meltdown\" very early Wednesday morning (0300) that sounds as if it was a panic episode, feeling overwhelmed, found contact with  at that time very helpful.[DB1.3]  Has been treated by psychiatrist in community. Does not currently have a psychotherapist and asks about options for ongoing psychological support.  Using resources for emotional and spiritual support well. Very pleased with her care here. Reports that she is currently not feeling scared,[DB1.1] plans to spend time this evening with two of her closest friends and talk to some of her siblings.  Taught her a compassion practice that she found very helpful and that I encouraged her to use as she proceeds through the process of events tonight and tomorrow.  Asks re Advanced Healthcare Directive. I explained that it might be logistically difficult to get that form complete and notarized prior to surgery. Provided her with POLST form that she will complete with help of nursing. Dr Vick from Angela's team informed re this and will come to floor tomorrow morning to sign the form, asks that it be placed in Ms Corona's paper chart.  I do note that psychiatry notes in Care Everywhere indicate a primary diagnosis of Dysthymic Disorder. However, based on Ms Corona's reports of symptoms over the past months, including frequent " suicidal ideation, it seems probable that she currently also meets criteria for an episode of Major Depressive Disorder.   Affect varied, appropriate, congruent with content of conversation.    Diagnostic impression (based on information from patient and from notes in Care Everywhere): Dysthymic Disorder (34.1), Major depressive disorder, single episode, moderate (F32.1), Generalized Anxiety Disorder (F41.1), Attention Deficit Disorder without mention of hyperactivity (F90.0) . History of panic disorder.    Full dictation to follow.[DB1.3]        *In accordance with the Rules of the [place] [employer], it is noted that psychological descriptions and scientific procedures underlying psychological evaluations have limitations.  Absolute predictions cannot be made based on information in this report.[DB1.1]     Revision History        User Key Date/Time User Provider Type Action    > DB1.2 7/7/2017  9:26 AM Alecia Mina, PhD LP Psychologist Addend     DB1.3 7/6/2017  5:43 PM Alecia Mina, PhD BENJAMIN Psychologist Sign     DB1.1 7/6/2017  4:30 PM Alecia Mina, PhD LP Psychologist             Consults by Diana Ortega APRN CNP at 7/5/2017 12:09 PM     Author:  Diana Ortega APRN CNP Service:  Interventional Radiology Author Type:  Nurse Practitioner    Filed:  7/5/2017 12:09 PM Date of Service:  7/5/2017 12:09 PM Creation Time:  7/5/2017 11:52 AM    Status:  Signed :  Diana Ortega APRN CNP (Nurse Practitioner)     Consult Orders:    1. Interventional Radiology IP Consult: IVC filter placement; Consultant may enter orders: Yes; Patient to be seen: Routine - within 24 hours [[id number]] ordered by Eli Vick MD at 07/04/17 1120                IR consulted for IVC filter placement prior to surgical resection of large pelvic mass. Image concerning for gynecologic malignancy. Patient is on the surgery schedule for Friday, 7/7. Patient does have recent  diagnosis of LLE DVT and is currently being anticoagulated with xarelto. US from 6/1 showed clot in proximal left peroneal and posterior tibial veins.     CT scan from 7/1/2017 reviewed with Dr. Bro from IR. Due to large mass compressing infrarenal IVC, unable to safely place filter prior to surgery. Also, IVC is too large to place a filter suprarenal.     IR can place filter post-surgically if team feels they will be unable to safely anticoagulate for a long period of time, but we would require a CT scan prior to proceeding (after the mass is surgically resected). These recommendations were relayed Dr. Vick from [facility]. She was going to share this information with gyn onc.    Diana Ortega DNP, MARLON  Interventional Radiology   Phone: [phone]  Pager: [phone][KM1.1]       Revision History        User Key Date/Time User Provider Type Action    > KM1.1 7/5/2017 12:09 PM Diana Ortega APRN CNP Nurse Practitioner Sign                     Progress Notes - Physician (Notes for yesterday and today)      Progress Notes by Laura Pack MD at 7/12/2017  8:55 AM     Author:  Laura Pack MD Service:  Gyn/Onc Author Type:  Physician    Filed:  7/12/2017  9:03 AM Date of Service:  7/12/2017  8:55 AM Creation Time:  7/12/2017  1:47 AM    Status:  Signed :  Laura Pack MD (Physician)         GYN ONC PROGRESS NOTE  07/12/17    HD#:11 /POD#:5 XL, HELENE, LSO, omentectomy, evacuation of abdominal fluid, PPALND  Disease: Stage 1C borderline ovarian tumor    24 hour events:   - WOC recs: continue daily dressing changes, clean with Microklenz[MS1.1]  - Lymphedema therapist: placed compression wraps for comfort, compression stockings indicated after swelling decreases[DF1.1]  - SW: discharge to [facility]  - PT: patient declined  - Bowel movement x3    Subjective: Patient is feeling[MS1.1] better today[DF1.1].  Pain is[MS1.1] controlled with PO dilaudid, reporting some  breakthrough pain at the inferior aspect of her incision[DF1.1].  Eating[MS1.1] a regular diet, but backing off a little due to emesis yesterday[DF1.1].[MS1.1] Drinking fluids.  Passing[DF1.1] Flatus,[MS1.1] 3[DF1.1] BM[MS1.1]s yesterday[DF1.1].  Voiding spontaneously.     Objective:   Vitals:    07/11/17 1014 07/11/17 1419 07/11/17 1514 07/11/17 2312   BP:  119/70 134/79 115/67   BP Location:  Right arm Right arm Right arm   Pulse: 96      Resp:  18 18 18   Temp:  98.7  F (37.1  C) 98.6  F (37  C) 99.9  F (37.7  C)   TempSrc:  Oral Oral Oral   SpO2: 97% 92% 94% 96%   Weight:       Height:             Gen- alert, no distress, cooperative  CV- Regular rate and rhythm,[MS1.1] 3/6 systolic murmur at R/LUSB[DF1.1]  Pulm- Normal - Clear to auscultation without rales, rhonchi, or wheezing. and bilateral air exchange present  Abd- soft, appropriately tender, non distended  Incision- dry and intact  Extr- 1+ LE edema,[MS1.1] bilateral compression wrappings[DF1.1], bite on left leg is covered by a bandage which is dry     I/Os  (Yesterday // Since Midnight)  PO[MS1.1] 880[DF1.1]cc //[MS1.1] 120[DF1.1]cc  IVF[MS1.1] 0[DF1.1]cc //[MS1.1] 0[DF1.1]cc  Urine[MS1.1] 1650[DF1.1]cc //[MS1.1] 250[DF1.1]cc    New Labs/Imaging-   Results for orders placed or performed during the hospital encounter of 07/02/17 (from the past 24 hour(s))   Glucose by meter   Result Value Ref Range    Glucose 123 (H) 70 - 99 mg/dL   CBC with platelets   Result Value Ref Range    WBC 6.8 4.0 - 11.0 10e9/L    RBC Count 2.77 (L) 3.8 - 5.2 10e12/L    Hemoglobin 7.4 (L) 11.7 - 15.7 g/dL    Hematocrit 24.2 (L) 35.0 - 47.0 %    MCV 87 78 - 100 fl    MCH 26.7 26.5 - 33.0 pg    MCHC 30.6 (L) 31.5 - 36.5 g/dL    RDW 14.6 10.0 - 15.0 %    Platelet Count 370 150 - 450 10e9/L   Basic metabolic panel   Result Value Ref Range    Sodium 140 133 - 144 mmol/L    Potassium 3.7 3.4 - 5.3 mmol/L    Chloride 103 94 - 109 mmol/L    Carbon Dioxide 31 20 - 32 mmol/L    Anion Gap  6 3 - 14 mmol/L    Glucose 128 (H) 70 - 99 mg/dL    Urea Nitrogen 14 7 - 30 mg/dL    Creatinine 1.00 0.52 - 1.04 mg/dL    GFR Estimate 56 (L) >60 mL/min/1.7m2    GFR Estimate If Black 68 >60 mL/min/1.7m2    Calcium 8.4 (L) 8.5 - 10.1 mg/dL   UA with Microscopic reflex to Culture   Result Value Ref Range    Color Urine Yellow     Appearance Urine Clear     Glucose Urine Negative NEG mg/dL    Bilirubin Urine Negative NEG    Ketones Urine Negative NEG mg/dL    Specific Gravity Urine 1.008 1.003 - 1.035    Blood Urine Negative NEG    pH Urine 5.0 5.0 - 7.0 pH    Protein Albumin Urine Negative NEG mg/dL    Urobilinogen mg/dL Normal 0.0 - 2.0 mg/dL    Nitrite Urine Negative NEG    Leukocyte Esterase Urine Negative NEG    Source Midstream Urine     WBC Urine 1 0 - 2 /HPF    RBC Urine 1 0 - 2 /HPF    Bacteria Urine Few (A) NEG /HPF    Squamous Epithelial /HPF Urine <1 0 - 1 /HPF    Mucous Urine Present (A) NEG /LPF   Glucose by meter   Result Value Ref Range    Glucose 144 (H) 70 - 99 mg/dL   Glucose by meter   Result Value Ref Range    Glucose 142 (H) 70 - 99 mg/dL   Glucose by meter   Result Value Ref Range    Glucose 120 (H) 70 - 99 mg/dL   Glucose by meter   Result Value Ref Range    Glucose 121 (H) 70 - 99 mg/dL[MS1.1]       Pending cultures (date obtained): None     Assessment: Ms. Corona is a 62 year old female with a borderline ovarian tumor that is POD#5 from a XL, HELENE, LSO, omentectomy, evacuation of abdominal fluid, PPALND, postoperative course complicated by poor pain control, improving.      Active Problem list:    - postoperative state  - borderline ovarian tumor  - DVT  - anemia  - HTN  - Cellulitis  - T2DM      FEN:   - Regular diet      Pain:   - Scheduled tylenol and gabapentin. PRN PO dilaudid.       Heme:   - Recent diagnosis on 6/5 of unprovoked LLE DVT, on Xarelto per hematology.    - Chronic anemia, Hgb 9.6 > 8.6>7.6, hemodilution vs slow intraabdominal bleeding. Will continue to monitor, transfuse if  Hgb <7.       CV:   - HTN, HLD. home Lisinopril held as blood pressures have been low-normal.  Continue to monitor.[MS1.2] Lower extremity edema of unknown etiology, most likely due to post surgical fluid accumulation. Lymphedema wraps placed.[DF1.1]       Pulm:   - No prior hx of lung dz, however pt with SOB and wheezing on exam. CXR WNL on admission. Requiring 1-2L oxygen to maintain gurpreet >90% since surgery. Consider atelectasis vs mild pulmonary edema 2/2 fluid administration vs PE. Encourage IS. No indication for CTA as pt is already on anticoagulation. Consider CXR if increasing O2 requirements or pt develops fever.[MS1.2] Plan to d/c with O2 to TCU[DF1.1]      GI:   - NI      :  - No issues      ID:   - Cellulitis: Pt has L thigh cellulitis 2/2 dog bite, febrile on admission. s/p IV Vanco/Zosyn and 7 days augmentin. >24hr afebrile. WOC nurse recommends daily dressing changes and cleaning with microklenz.       Endocrine:   - T2DM. Medium SSI. Metformin held.  Blood glucose mildly elevated, continue to monitor closely.[MS1.2] Plan to restart metformin on d/c.[DF1.1]      Psych/Neuro:   - anxiety/depression, on home Lexapro      PPX:   - SCD R leg, Xarelto as above.       Disposition: Possible d/c to TCU today.[MS1.2]             The patient was seen and examined with the resident, the labs and vital signs were reviewed.The medical care outlined above was provided under my directives and direct supervision.    Laura Pack MD, MPH  Gynecology Oncology[RG1.1]       Revision History        User Key Date/Time User Provider Type Action    > RG1.1 7/12/2017  9:03 AM Laura Pack MD Physician Sign     DF1.1 7/12/2017  6:05 AM Anusha Kearns Medical Student Share     MS1.2 7/12/2017  2:02 AM Penny Quan MD Resident Share     MS1.1 7/12/2017  1:47 AM Penny Quan MD Resident             Progress Notes by Penny Quan MD at 7/11/2017  6:00 AM     Author:  Penny Quan  MD Shannon Service:  Gyn/Onc Author Type:  Resident    Filed:  7/11/2017  7:27 AM Date of Service:  7/11/2017  6:00 AM Creation Time:  7/10/2017  6:03 PM    Status:  Signed :  Penny Quan MD (Resident)         GYN ONC PROGRESS NOTE  07/11/17    HD#:10 /POD#:4 XL, HELENE, LSO, omentectomy, evacuation of abdominal fluid, PPALND  Disease: Stage 1C borderline ovarian tumor    24 hour events:   - Lymphedema consult, awaiting recs  - PT recommend TCU placement after discharge  - SW, working on TCU placement  - Walking O2sats: 93% at rest on room air, 97% at rest w/ 2L O2, 87% w/ activity on room air, 95% w/ activity w/ 2L O2[MS1.1]  - Urinary incontinence: UA[MS1.2] reassuring[MS1.3]      Subjective: Patient is[MS1.1] complaining of more pain this morning, 8/10[MS1.4] .[MS1.1] The pain is located in her lower abdomen and radiates to her left hip. She says the pain meds are not helping as much today as they were previously. She is also concerned today about urinary incontinence that she has been experiencing for the past 2 days, especially at night. She usually knows when she is urinating, no dysuria, no hematuria, no urgency or increased frequency. She reports having a UTI with similar symptoms in the past. Eating a regular diet, drinking fluids by mouth. Passing flatus. Two small bowel movements overnight, still feels constipated despite increased bowel regimen. Voiding spontaneously. She is also concerned with the swelling in her legs, self reported 20 pound weight gain. Her skin feels tight in her lower extremities.[MS1.4]    Objective:   Vitals:    07/10/17 0722 07/10/17 1204 07/10/17 1345 07/10/17 1508   BP: 114/71 132/77  119/73   BP Location: Right arm Right arm  Right arm   Pulse: 84 98  88   Resp: 16 18  16   Temp: 96.5  F (35.8  C) 98.3  F (36.8  C)  98.1  F (36.7  C)   TempSrc: Oral Oral  Oral   SpO2: 94% 95%  95%   Weight:   102.4 kg (225 lb 12.8 oz)    Height:             Gen- alert, no distress,  cooperative  CV- Regular rate and rhythm, no murmur present  Pulm- Normal - Clear to auscultation without rales, rhonchi, or wheezing. and bilateral air exchange present  Abd- soft, appropriately tender, non distended  Incision- dry and intact  Extr- 1+ LE edema, SCD in place on Rt leg, bite on left leg is covered by a bandage which is dry but has some shadowing    I/Os  (Yesterday // Since Midnight)  PO[MS1.1] 990[MS1.5]cc //[MS1.1] 0[MS1.5]cc  IVF[MS1.1] 1020[MS1.5]cc //[MS1.1] 0[MS1.5]cc  Urine[MS1.1] 1095[MS1.5]cc //[MS1.1] 625[MS1.5]cc    New Labs/Imaging-   Results for orders placed or performed during the hospital encounter of 07/02/17 (from the past 24 hour(s))   Glucose by meter   Result Value Ref Range    Glucose 118 (H) 70 - 99 mg/dL   Glucose by meter   Result Value Ref Range    Glucose 123 (H) 70 - 99 mg/dL   Glucose by meter   Result Value Ref Range    Glucose 103 (H) 70 - 99 mg/dL   Basic metabolic panel   Result Value Ref Range    Sodium 141 133 - 144 mmol/L    Potassium 4.0 3.4 - 5.3 mmol/L    Chloride 106 94 - 109 mmol/L    Carbon Dioxide 32 20 - 32 mmol/L    Anion Gap 3 3 - 14 mmol/L    Glucose 108 (H) 70 - 99 mg/dL    Urea Nitrogen 18 7 - 30 mg/dL    Creatinine 1.17 (H) 0.52 - 1.04 mg/dL    GFR Estimate 47 (L) >60 mL/min/1.7m2    GFR Estimate If Black 56 (L) >60 mL/min/1.7m2    Calcium 8.6 8.5 - 10.1 mg/dL   CBC with platelets   Result Value Ref Range    WBC 8.7 4.0 - 11.0 10e9/L    RBC Count 2.80 (L) 3.8 - 5.2 10e12/L    Hemoglobin 7.6 (L) 11.7 - 15.7 g/dL    Hematocrit 24.8 (L) 35.0 - 47.0 %    MCV 89 78 - 100 fl    MCH 27.1 26.5 - 33.0 pg    MCHC 30.6 (L) 31.5 - 36.5 g/dL    RDW 15.0 10.0 - 15.0 %    Platelet Count 343 150 - 450 10e9/L       Pending cultures (date obtained): None    Assessment: Ms. Corona is a 62 year old female with a borderline ovarian tumor that is POD#4 from a XL, HELENE, LSO, omentectomy, evacuation of abdominal fluid, PPALND, postoperative course complicated by poor pain  control, improving.     Active Problem list:    - postoperative state  - borderline ovarian tumor  - DVT  - anemia  - HTN  - Cellulitis  - T2DM      FEN:   - Regular diet      Pain:   - Scheduled tylenol and gabapentin. PRN PO dilaudid.       Heme:   - Recent diagnosis on 6/5 of unprovoked LLE DVT, on Xarelto per hematology.    - Chronic anemia, Hgb 9.6 > 8.6>7.6, hemodilution vs slow intraabdominal bleeding. Will continue to monitor, transfuse if Hgb <7.       CV:   - HTN, HLD. home Lisinopril held as blood pressures have been low-normal.  Continue to monitor.      Pulm:   - No prior hx of lung dz, however pt with SOB and wheezing on exam. CXR WNL on admission. Requiring 1-2L oxygen to maintain gurpreet >90% since surgery. Consider atelectasis vs mild pulmonary edema 2/2 fluid administration vs PE. Encourage IS. No indication for CTA as pt is already on anticoagulation. Consider CXR if increasing O2 requirements or pt develops fever.       GI:   - NI     :  - No issues      ID:   - Cellulitis: Pt has L thigh cellulitis 2/2 dog bite, febrile on admission. s/p IV Vanco/Zosyn and 7 days augmentin. >24hr afebrile. WOC nurse to evaluate.       Endocrine:   - T2DM. Medium SSI. Metformin held.  Blood glucose mildly elevated, continue to monitor closely.      Psych/Neuro:   - anxiety/depression, on home Lexapro      PPX:   - SCD R leg, Xarelto as above.       Disposition: Possible d/c to TCU today or tomorrow.[MS1.1]       Penny uQan MD PhD  Ob/Gyn PGY-2  7/11/2017 7:27 AM[MS1.3]    Pager [phone][MS1.1]     The patient was seen and examined with the resident, the labs and vital signs were reviewed.The medical care outlined above was provided under my directives and direct supervision.    Laura Pack MD, MPH  Gynecology Oncology[RG1.1]         Revision History        User Key Date/Time User Provider Type Action    > MS1.3 7/11/2017  7:27 AM Penny Quan MD Resident Sign     MS1.4 7/11/2017  7:24 AM  Penny Quan MD Resident Share     RG1.1 7/11/2017  7:05 AM Laura Pack MD Physician Share     MS1.5 7/11/2017  6:09 AM Penny Quan MD Resident Share     [N/A] 7/11/2017  6:00 AM Penny Quan MD Resident Share     MS1.2 7/11/2017  3:59 AM Penny Quan MD Resident Share     [N/A] 7/10/2017  6:23 PM Penny Quan MD Resident Share     MS1.1 7/10/2017  6:12 PM Penny Quan MD Resident Share            Progress Notes by Krishan Grady MD at 7/9/2017  3:53 PM     Author:  Krishan Grady MD Service:  Anesthesiology Author Type:  Resident    Filed:  7/9/2017  4:49 PM Date of Service:  7/9/2017  3:53 PM Creation Time:  7/9/2017  3:53 PM    Status:  Attested :  Krishan Grady MD (Resident)    Cosigner:  Mohinder Nguyen MD at 7/11/2017  7:09 AM        Attestation signed by Mohinder Nguyen MD at 7/11/2017  7:09 AM        Physician Attestation   I agree with the information in this note.    Mohinder Nguyen MD                               [facility]ESIA PAIN SERVICE CONTINUOUS NERVE INFUSION NOTE  SUBJECTIVE:  Interval History: Pt reports[VN1.1] minimal[VN1.2] pain control via continuous peripheral nerve block (CPNB) infusion[VN1.1] even with boluses[VN1.2].  Increased pain overnight requiring[VN1.1] last bolus at 0400[VN1.2], however pain[VN1.1] was[VN1.2] better controlled[VN1.1] today with continuation of oral pain medications.[VN1.2]  No changes in neurologic status on exam.  Denies any weakness, paresthesias, circumoral numbness, metallic taste or tinnitus.  Pt is ambulating with assistance.  Patient is currently with no nausea or vomiting.[VN1.1] Normally on home oxygen.[VN1.2]     Clinically Aligned Pain Assessment (CAPA):   Comfort (How is your pain?):[VN1.1] Uncomfortable, controlled with oral medications[VN1.2]  Change in Pain (Since your last medication/intervention?):[VN1.1] Stable[VN1.2]  Pain Control (How are your  pain treatments working?):  Partially effective pain control  Functioning (Are you able to do activities to get better?) : Can do most things[VN1.1] with assistance[VN1.2], but pain gets in the way of some   Sleep (Does your pain management allow you to sleep or rest?): Awake with occasional pain[VN1.1], difficulty sleeping at times[VN1.2]     Numerical Rating Scale:[VN1.1]  5[VN1.2]/10 at rest and[VN1.1] 8[VN1.2]/10 with movement.        Anticoagulation:  Lovenox 40 mg[VN1.1] Q24h (LAST DOSE >24 hours ago)[VN1.2]      OBJECTIVE:    Diagnostic:[VN1.1]  Lab Results   Component Value Date    WBC 8.8 07/08/2017    HGB 8.3 (L) 07/08/2017    HCT 25.7 (L) 07/08/2017     07/08/2017     07/08/2017    POTASSIUM 4.0 07/08/2017    CHLORIDE 106 07/08/2017    CO2 30 07/08/2017    BUN 20 07/08/2017    CR 1.08 (H) 07/08/2017     (H) 07/08/2017    SED 25 06/05/2017    AST 27 07/02/2017    ALT 14 07/02/2017    ALKPHOS 92 07/02/2017    BILITOTAL 0.7 07/02/2017    INR 1.17 (H) 07/02/2017[VN1.3]       Vitals:[VN1.1]    Temp:  [36.5  C (97.7  F)-37.3  C (99.2  F)] 36.6  C (97.8  F)  Pulse:  [] 97  Heart Rate:  [101-107] 106  Resp:  [8-18] 16  BP: ()/(58-92) 125/71  SpO2:  [89 %-96 %] 96 %[VN1.4]    Exam:    Strength 5/5 and symmetric grossly in bilateral LE[VN1.1], ambulates with assitance, on continuous oxygen[VN1.2]   T9 Epidural catheter site intact with dressing c/d/i, no tenderness, erythema, heme, edema      ASSESSMENT/PLAN:    Hafsa Corona is a 62 year old female POD #2 s/p COMBINED LAPAROTOMY, TUMOR DEBULKING  SALPINGO-OOPHORECTOMY BILATERAL  COMBINED HYSTERECTOMY TOTAL ABDOMINAL, SALPINGO-OOPHORECTOMY, NODE DISSECTION and placement of T9 epidural catheter with  for analgesia.[VN1.1]  Patient takes xarelto and last took a dose 7/5.[VN1.2] Pt is receiving[VN1.1] minimal[VN1.2] analgesia with bupivacaine 0.125%, total infusion 1[VN1.1]4[VN1.2] mL/hour.  Pt is[VN1.1] at baseline on oxygen and  ambulates with assistance[VN1.2].  No weakness or paresthesias, adequate sensory block.  No evidence of adverse side effects associated with local anesthetic.[VN1.1] Request from primary team made to remove epidural today.[VN1.2]    -[VN1.1] Epidural removed at 1630, tip intact, easily removed and no blood or discomfort noted  - Ok to start anticoagulation ~4 hours from removal  -Had extended discussion with patient on realistic expectations of pain management post op. She also appears sedated during interview, she was informed that due to current need for oxygen at baseline, opioids could lead to respiratory depression and may be held if she was overly sedated.[VN1.2]  - discussed plan with attending anesthesiologist    Krishan Grady MD  Anesthesia Resident - CA2  Pager: [phone]  7/9/2017  15:55 PM  [facility]      24 hour Job Code Pager.  For in-house use only.     [place]:  * * [phone]  [place] [employer]: * * [phone]  Peds: * * [phone]  Enter call-back number and #      This pager only accepts text messages through [facility][VN1.1]       Revision History        User Key Date/Time User Provider Type Action    > VN1.3 7/9/2017  4:49 PM Krishan Grady MD Resident Sign     VN1.2 7/9/2017  4:40 PM Krishan Grady MD Resident      VN1.4 7/9/2017  3:54 PM Krishan Grady MD Resident      VN1.1 7/9/2017  3:53 PM Krishan Grady MD Resident             Progress Notes by Eli Vick MD at 7/7/2017 11:46 AM     Author:  Eli Vick MD Service:  Family Medicine Author Type:  Resident    Filed:  7/7/2017 12:31 PM Date of Service:  7/7/2017 11:46 AM Creation Time:  7/7/2017 11:46 AM    Status:  Attested :  Eli Vick MD (Resident)    Cosigner:  Darwin Antonio MD at 7/11/2017  5:08 AM        Attestation signed by Darwin Antonio MD at 7/11/2017  5:08 AM        Attestation:  This patient has been seen and evaluated by me, Darwin Antonio on 7/7/17.  I saw and discussed  the case with the primary resident and the care team. I agree with the findings and plan in this note. I expended more than 45 minutes reviewing today's vital signs, medications, laboratory results.  Darwin Antonio MD  [facility] Medicine                                       [facility] - Inpatient daily progress note    Date of admission: 7/2/2017  Date of service: 7/7/2017         Assessment and Plan:   Assessment:   Hafsa is a 62 year old female with h/o right salpingo-oopherectomy (2007), DVT (diagnosed 6/5/2017) on xarelto, DM2, HLD, admitted for evaluation of pelvic mass on CT concerning for gyn malignancy, on OR today with gyn/onc for both diagnosis and treatment. Also managing LUE abscess s/p I&D 7/2  Patient Active Problem List   Diagnosis     Attention deficit disorder     PANIC DISORDER      VASOMOTOR RHINITIS     Chronic Maxillary Sinusitis     Tachycardia     Type 2 diabetes mellitus without complication (H)     HYPERLIPIDEMIA LDL GOAL <100     Allergic rhinitis due to other allergen     BMI > 35     Essential hypertension     Lumbago     Major depressive disorder, recurrent episode, mild (H)     Long-term (current) use of anticoagulants [Z79.01]     Deep vein thrombosis (DVT) (H) [I82.409]     Pelvic mass in female      Plan:   ##Pelvic Mass on CT, concerning for malignancy  ##Pelvic Pain, abdominal distension    elevated at 1187. Gyn/onc consulted,[ST1.1] OR today for both diagnosis and treatment.[ST1.2]     -[ST1.1]OR today for:[ST1.2] exploratory laparotomy, bilateral SPO, possible cancer staging including hysterectomy, pelvic/para-aortic lymphadenopathy, omentectomy, peritoneal biopsies  -[ST1.1]-[ST1.2]anticoagulation plan post op per gyn/onc. Hb check 24hrs post-operatively before starting any pharmacologic anticoagulation.[ST1.1] Anticipate starting lovenox post-op.[ST1.2]  -NPO[ST1.1] for OR[ST1.2]  -post op check this evening[ST1.3]  -pain control with dilaudid 2-4mg  PO q4h prn, tylenol 1000mg q8h prn[ST1.1]. May require IV pain medication post op if unable to tolerate PO due to nausea from gen anesthesia.  -z[ST1.3]ofran for nausea    ##Left upper extremity abscess s/p I&D-improving  Due to dog bite incident on 6/27. S/P I&D 7/2 and then started on broad-spectrum abx. Patient up to date on tetanus. CRP down trending, and remains afebrile.   -continue PO augmentin ([ST1.1]7/4--). Today is day 5 of total Abx treatment (IV Vanc and Zosyn 7/3). Will plan to complete 7 days of Abx (7/3-7/10)[ST1.3]  -daily dressing changes  -Leg elevation   -Pain relief with dilaudid 2-4mg PO q4h prn, tylenol 1000 mg Q8H PRN    ##Normocytic anemia  Baseline hemoglobin ~10. Her hemoglobin has been down trending over the last month. DDx include malignancy (chronic disease) vs nutritional deficiency. No active signs or symptoms of bleeding. Anemia work up revealing for low iron and low iron saturation. S/P 2 units PRBC 7/5 in setting of low Hb and optimization prior to OR, with improvement in Hb   -re-check Hb 24hrs post operatively[ST1.1]   -consider IV iron post operatively[ST1.2]    ##Constipation[ST1.1]-improving  Bowel movement last night and this morning[ST1.2]  -[ST1.1]continue[ST1.3] bowel regimen: Miralax BID and Senokot BID     ##tachypnea-resolved  ##hypoxia-resolved  Alternates between room air and supplemental O2 with stable O2 sats.Hafsa reports difficulty with deep inspiration, likely due to compression on diaphragm by mass preventing full lung expansion. No pleuritic chest pain and not in any apparent respiratory distress.[ST1.1]   -con[ST1.3]tinue incentive spirometry   -continuous pulse ox  -supplemental O2 as needed    ##Left lower extremity DVT  Diagnosed in early June 2017. Patient initially on warfarin but then started on xarelto. May be secondary to venous compression by pelvic mass causing venous stasis vs malignancy vs hypercoagulable state  -[ST1.1]xarelto held for OR  today[ST1.3]  -Consider lovenox post operatively if Hb stable and in setting of likely malignancy      Chronic stable conditions:   ##HTN- Cont PTA lisinopril   ##Type 2 DM, HgbA1c 6 on 04/11/17  -Hold home metformin while patient in hospital  -Start MSSI correction while inpatient  ## HLD- Cont PTA lipitor  ##MDD, BLAIR, panic disorder- Cont PTA lexapro.   -psychology consult[ST1.1]ed and following[ST1.3]      Fluids: PO  Electrolytes: not checked  Nutrition:[ST1.1] NPO, resume diet post op[ST1.3]  Lines: PIV  Activity: -Up as tolerated  CODE: Full Code[ST1.1], POLST filled out and signed 7/7[ST1.3]  Prophylaxis: on xarelto (to be held today)  PCP communication:  - Morelia Ayon  Dispo: will remain inpatient until surgery on Friday 7/7/2017, following this will be discharged pending post-op recovery.             Interval History:   No acute events overnight.[ST1.1] Ready for OR today. Not feeling nervous or anxious. In good spirits. Is happy with her stay to date and the care she has received. No new concerns.[ST1.3]             Review of Systems:   Review of Systems   Respiratory: Negative for shortness of breath.    Cardiovascular: Negative for chest pain.   Gastrointestinal: Positive for abdominal pain. Negative for[ST1.1] constipation[ST1.3], diarrhea, nausea and vomiting.          Physical Exam (Resident / Clinician):     Vitals were reviewed  Patient Vitals for the past 8 hrs:   BP Temp Temp src Pulse Resp SpO2   07/07/17 1108 133/90 98.7  F (37.1  C) Oral 77 18 95 %   07/07/17 0620 121/86 97  F (36.1  C) Oral 81 16 93 %   07/07/17 0415 - - - - - 92 %   07/07/17 0414 125/78 98  F (36.7  C) Oral 74 16 90 %       Physical Exam   Constitutional: She appears well-developed and well-nourished. No distress.   Cardiovascular:[ST1.1] Normal rate[ST1.3] and[ST1.1] normal heart sounds[ST1.3].    Pulmonary/Chest: Effort normal. No respiratory distress. She has no wheezes. She has no rales.   Abdominal: She exhibits  distension and mass (palpable mass). There is[ST1.1] no tenderness[ST1.3]. There is no guarding.   Skin: She is not diaphoretic.[ST1.1]   Left lateral thigh abscess s/p I&D, dressing blood-tinged. Not removed this morning.[ST1.3]    Psychiatric: She has a normal mood and affect. Her behavior is normal. Judgment and thought content normal.[ST1.1]     Intake/Output Summary (Last 24 hours) at 07/07/17 1147  Last data filed at 07/07/17 1000   Gross per 24 hour   Intake             1040 ml   Output             3125 ml   Net            -2085 ml[ST1.4]           Data:   ROUTINE LABS (Last four results)  CMP    Recent Labs  Lab 07/03/17  0934 07/02/17  2255 07/01/17  1841    141 141   POTASSIUM 3.9 4.1 4.0   CHLORIDE 106 104 105   CO2 26 25 23   ANIONGAP 8 12 13   * 129* 118*   BUN 17 19 20   CR 0.88 0.84 0.82   GFRESTIMATED 65 68 70   GFRESTBLACK 78 83 85   COLE 8.5 8.4* 9.3   PROTTOTAL  --  6.6* 7.4   ALBUMIN  --  3.2* 3.6   BILITOTAL  --  0.7 0.4   ALKPHOS  --  92 103   AST  --  27 21   ALT  --  14 17     CBC    Recent Labs  Lab 07/07/17  1138 07/06/17  0749 07/05/17  0910 07/04/17  1345   WBC 6.6 5.2 6.9 8.8   RBC 3.43* 3.34* 2.86* 3.19*   HGB 9.4* 9.1* 7.8* 8.6*   HCT 29.4* 28.4* 24.5* 27.0*   MCV 86 85 86 85   MCH 27.4 27.2 27.3 27.0   MCHC 32.0 32.0 31.8 31.9   RDW 14.7 14.6 14.8 14.6    295 289 303     INR    Recent Labs  Lab 07/02/17  2255   INR 1.17*     CRP    Recent Labs  Lab 07/06/17  0823 07/05/17  0714 07/04/17  1345   CRP 49.0* 84.0* 110.0*         Blood culture:  Invalid input(s): BC   Urine culture:  No results for input(s): URC in the last 168 hours.  All cultures:    Recent Labs  Lab 07/02/17  2302 07/02/17  2253   CULT No growth after 4 days No growth after 4 days           Medications:     Current Facility-Administered Medications   Medication     ceFAZolin sodium-dextrose (ANCEF) infusion 2 g     ceFAZolin (ANCEF) 1 g vial to attach to  ml bag for ADULT or 50 ml bag for PEDS      fentaNYL Citrate (PF) (SUBLIMAZE) injection 25-50 mcg     midazolam (VERSED) injection 1-2 mg     naloxone (NARCAN) injection 0.1-0.4 mg     flumazenil (ROMAZICON) injection 0.2 mg     [Auto Hold] ceFAZolin sodium-dextrose (ANCEF) infusion 2 g     lactated ringers infusion     [Rx hold ] rivaroxaban ANTICOAGULANT (XARELTO) tablet 20 mg     [Auto Hold] senna-docusate (SENOKOT-S;PERICOLACE) 8.6-50 MG per tablet 2 tablet     [Auto Hold] polyethylene glycol (MIRALAX/GLYCOLAX) Packet 17 g     [Auto Hold] HYDROmorphone (DILAUDID) tablet 2-4 mg     [Auto Hold] amoxicillin-clavulanate (AUGMENTIN) 875-125 MG per tablet 1 tablet     [Auto Hold] albuterol (PROAIR HFA/PROVENTIL HFA/VENTOLIN HFA) Inhaler 2 puff     [Auto Hold] atorvastatin (LIPITOR) tablet 80 mg     [Auto Hold] cholecalciferol (vitamin D) tablet 2,000 Units     [Auto Hold] escitalopram (LEXAPRO) tablet 20 mg     [Auto Hold] fluticasone (FLONASE) 50 MCG/ACT spray 2 spray     [Auto Hold] lisinopril (PRINIVIL/ZESTRIL) tablet 10 mg     [Auto Hold] naloxone (NARCAN) injection 0.1-0.4 mg     Patient is already receiving anticoagulation with heparin, enoxaparin (LOVENOX), warfarin (COUMADIN)  or other anticoagulant medication     [Auto Hold] ondansetron (ZOFRAN-ODT) ODT tab 4 mg    Or     [Auto Hold] ondansetron (ZOFRAN) injection 4 mg     [Auto Hold] glucose 40 % gel 15-30 g    Or     [Auto Hold] dextrose 50 % injection 25-50 mL    Or     [Auto Hold] glucagon injection 1 mg     [Auto Hold] insulin aspart (NovoLOG) inj (RAPID ACTING)     [Auto Hold] insulin aspart (NovoLOG) inj (RAPID ACTING)     [Auto Hold] acetaminophen (TYLENOL) tablet 1,000 mg     [Auto Hold] multivitamin, therapeutic with minerals (THERA-VIT-M) tablet 1 tablet     [Auto Hold] vitamin B complex with vitamin C (STRESS TAB) tablet 1 tablet       Caring Physician: Daniela Vick MD  [facility]  Pager Contact: see Physician sticky note[ST1.1]       Revision History        User Key  Date/Time User Provider Type Action    > ST1.3 7/7/2017 12:31 PM Eli Vick MD Resident Sign     ST1.2 7/7/2017 12:00 PM Eli Vick MD Resident      ST1.4 7/7/2017 11:47 AM Eli Vick MD Resident      ST1.1 7/7/2017 11:46 AM Eli Vick MD Resident             Progress Notes by Penny Quan MD at 7/11/2017  3:58 AM     Author:  Penny Quan MD Service:  Gyn/Onc Author Type:  Resident    Filed:  7/11/2017  3:59 AM Date of Service:  7/11/2017  3:58 AM Creation Time:  7/11/2017  3:58 AM    Status:  Signed :  Penny Quan MD (Resident)         Patient is having urinary incontinence and reports this happens when she gets a UTI. Will check a UA.    Penny Quan MD PhD  Ob/Gyn PGY-2  7/11/2017 3:59 AM[MS1.1]       Revision History        User Key Date/Time User Provider Type Action    > MS1.1 7/11/2017  3:59 AM Penny Quan MD Resident Sign            Progress Notes by Penny Quan MD at 7/11/2017  1:02 AM     Author:  Penny Quan MD Service:  Gyn/Onc Author Type:  Resident    Filed:  7/11/2017  1:15 AM Date of Service:  7/11/2017  1:02 AM Creation Time:  7/11/2017  1:02 AM    Status:  Signed :  Penny Quan MD (Resident)         Patient requested to speak with MD regarding constipation.[MS1.1]  Patient is constipated and uncomfortable. Ordered milk of magnesia and dulcolax suppository. Patient is hesitant that these will work. Discuss patient if these do not work we can order and enema.[MS1.2]     Penny Quan MD PhD  Ob/Gyn PGY-2  7/11/2017 1:15 AM[MS1.3]       Revision History        User Key Date/Time User Provider Type Action    > MS1.3 7/11/2017  1:15 AM Penny Quan MD Resident Sign     MS1.2 7/11/2017  1:04 AM Penny Quan MD Resident      MS1.1 7/11/2017  1:02 AM Penny Quan MD Resident                      Procedure Notes      Procedures by Serena Cole  Jess Roger MD at 7/7/2017  4:52 PM     Author:  Serena Cole MD Service:  Gyn/Onc Author Type:  Physician    Filed:  7/7/2017  4:54 PM Date of Service:  7/7/2017  4:52 PM Creation Time:  7/7/2017  4:41 PM    Status:  Addendum :  Serena Cole MD (Physician)     Pre-procedure Diagnoses:    1. Pelvic mass [R19.00]           Post-procedure Diagnoses:    1. 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 HYSTERECTOMY, PAP NO LONGER INDICATED [SD96525 (Custom)]    2. SALPINGO-OOPHORECTOMY BILATERAL [DZX3397 (Custom)]    3. LYMPHADENECTOMY RETROPERITONEAL [[id number] (Custom)]    4. OMENTECTOMY [[id number] (Custom)]               [facility] Brief Operative Note    Pre-operative diagnosis: Pelvic Mass      Post-operative diagnosis Apparent Stage IC2 at least borderline tumor of the left ovary, final pathology pending.     Procedure: Exploratory laparotomy, total abdominal hysterectomy,[DT1.1] left[DT1.2] salpingo-oophorectomy, cancer staging including infracolic omentectomy, bilateral pelvic & para-aortic lymphadenectomy, peritoneal biopsies, evacuation of pelvic fluid.[DT1.1]    Anesthesia Block      - Wound Class: II-Clean Contaminated[DT1.3]   Surgeon:[DT1.1] Serena Mercado[DT1.3] Kana[DT1.1] MD Kelsey[DT1.3]     Assistants(s): Danielle Rhodes     Estimated blood loss: 300 ml    Specimens: 1) Left ovary + fallopian tube (frozen section pathology)  2) Uterus, cervix  3) Right pelvic lymph nodes  4) Left pelvic lymph nodes  5) Right para-aortic lymph nodes  6) Left para-aortic lymph nodes  7) Omentum  8) Right pelvic biopsy  9) Left pelvic biopsy  10) Right paracolic gutter biopsy  11) Left paracolic gutter biopsy  12) Bladder peritoneum  13) Posterior cul-de-sac       Findings: Large amount of dark blood on entering the pelvis. Large 20 cm mass arising from the left ovary. Smooth ovarian capsule. Evidence of preoperative rupture of the mass with  spillage of hemorrhagic material. On frozen section pathology examination, findings were c/w at least borderline tumor. Enlarged left pelvic lymph nodes, however, not grossly replaced by tumor.[DT1.1] Right ovary and fallopian tube surgically absent.[DT1.2] No other evidence of extra-ovarian spread of disease, final pathology pending.    Complications: None apparent    Please see full dictated Operative Note for details.[DT1.1]    Serena Mercado Teoh  7/7/2017  4:52 PM[DT1.4]          Revision History        User Key Date/Time User Provider Type Action    > DT1.2 7/7/2017  4:54 PM Serena Cole MD Physician Addend     DT1.4 7/7/2017  4:52 PM Serena Cole MD Physician Sign     DT1.3 7/7/2017  4:46 PM Serena Cole MD Physician      DT1.1 7/7/2017  4:41 PM Serena Cole MD Physician                      Progress Notes - Therapies (Notes from 07/09/17 through 07/12/17)      Progress Notes by Suki Johnson OT at 7/11/2017  3:01 PM     Author:  Suki Johnson OT Service:  (none) Author Type:  Occupational Therapist    Filed:  7/11/2017  3:01 PM Date of Service:  7/11/2017  3:01 PM Creation Time:  7/11/2017  3:01 PM    Status:  Signed :  Suki Johnson OT (Occupational Therapist)            07/11/17 1115   Rehab Discipline   Discipline OT   Type of Visit   Type of visit Initial Edema Evaluation   General Information   Start of care 07/11/17   Referring physician Concepcion Kohler APRN CNP   Orders Evaluate and treat as indicated   Order date 07/10/17   Medical diagnosis ovarian tumor   Onset of illness / date of surgery 07/02/17   Edema onset (since May in L LE, ~1 week in R LE)   Affected body parts LLE;RLE;Trunk   Edema etiology comments surgery/omentectomy, pelvic mass, hypoalbuminemia, decreased muscle pump, fluid overload   Pertinent history of current problem (PT: include personal factors and/or comorbidities that impact the POC; OT: include additional occupational profile  "info) Ms. Corona is a 62 year old female with a borderline ovarian tumor that is POD#4 from a XL, HELENE, LSO, omentectomy, evacuation of abdominal fluid, PPALND, postoperative course complicated by poor pain control, improving.    Surgical / medical history reviewed Yes   Prior level of functional mobility I without AD   Prior treatment (none)   Patient role / employment history Employed   Living environment House / [facility]   Living environment comments Pt fatigued and with significant abdominal pain. Limited PLOF/background information obtained per pt request to limit talking. Per PT eval, \"Pt lives alone in single story duplex with basement (laundry located in basement, full flight down with handrail), [address]  home. Pt lives with two \"big\" dogs and a cat. Pt notes that home is messy and that dogs remain home alone for long hours dt her work schedule so she is required to  \"messes\". Pt reporting minimal assist available with brothers living in [place] and friends assisting their families. \"   Subjective Report   Patient report of symptoms Legs feel \"tight\" and \"heavy,\" making moving and dressing difficult   Precautions   Precautions (abdominal precautions)   Precautions comments may have had cellulitis in L LE, DVT (+) in May   Patient / Family Goals   Patient / family goals statement To lose weight in her legs   Pain   Patient currently in pain Yes, see vital signs flowsheet   Pain comments nauseous and with abdominal pain   Cognitive Status   Orientation Orientation to person, place and time   Level of consciousness Lethargic / Somnolent   Edema Exam / Assessment   Skin condition Dryness;Pitting   Skin condition comments L LE > R LE, pt reports she was diagnosed with blood clot in May and edema has existed ever since. Pt with taut 1+-2+ pitting in B LEs to the thigh, making ambulation and movement difficult. Abdomen distended. Pt with bruising/varicosities present on bilateral legs. Skin shiny, taut, with skin " creasing noted at MTPs. Pt with area of discoloration on distal L LE. Mepilex dressing applied to lateral L LE, R LE edematous ~1 week since surgery per pt. Dryness noted.    Pitting 1+;2+   Capillary refill Symmetrical   Dorsal pedal pulse Symmetrical   Girth Measurements   Girth Measurements Refer to separate girth measurement flowsheet   Range of Motion   ROM comments unable to assess, pt declined   Strength   Strength comments unable to assess, pt declined   Activities of Daily Living   Activities of Daily Living Pt lives alone, works FT assisting people find jobs. Pt was I with ADLs, driving, cooking, cleaning, laundry, medication management, and finances PTA. Pt takes care of 2 dogs and 1 cat.   Sensory   Sensory perception comments denied n/t   Planned Edema Interventions   Planned edema interventions Gradient compression bandaging;Fit for compression garment;Exercises;Precautions to prevent infection / exacerbation;Education;ADL training;Skin care / precautions;Home management program development   Clinical Impression   Criteria for skilled therapeutic intervention met Yes   Therapy diagnosis Decreased I/ease with ADLs   Influenced by the following impairments / conditions Edema;Wounds / Ulcers  (mepilex dressing over dog bite on L LE)   Assessment of Occupational Performance 3-5 Performance Deficits   Identified Performance Deficits home management, functional mobility/transfers, showering, dressing   Clinical Decision Making (Complexity) Low complexity   Treatment frequency 5 times / week  (BID OT/edema)   Treatment duration 1 week   Patient / family and/or staff in agreement with plan of care Yes   Risks and benefits of therapy have been explained Yes   Clinical impression comments Pt to benefit from skilled OT to address B LE edema, promote skin integrity, comfort, and ease/I with ADLs and IADLs for return to [facility]. See daily flowsheet for details regarding today's treatment.    Goals   Edema Eval Goals  "(IP) See plan of care for patient goals   Total Evaluation Time   Total evaluation time 8[BN1.1]        Revision History        User Key Date/Time User Provider Type Action    > BN1.1 7/11/2017  3:01 PM Suki Johnson OT Occupational Therapist Sign            Progress Notes by Airam Miller PT at 7/9/2017  1:28 PM     Author:  Airam Miller PT Service:  (none) Author Type:  Physical Therapist    Filed:  7/9/2017  1:29 PM Date of Service:  7/9/2017  1:28 PM Creation Time:  7/9/2017  1:28 PM    Status:  Signed :  Airam Miller PT (Physical Therapist)          07/09/17 1000   Quick Adds   Type of Visit Initial PT Evaluation  (Airam Miller PT, DPT )       Present no   Language English    Living Environment   Lives With alone   Living Arrangements other (see comments)  (duplex)   Home Accessibility bed and bath on same level;stairs to enter home;stairs (2 railings present)  (laundry in basement (full flight - L side handrail))   Number of Stairs to Enter Home 5   Number of Stairs Within Home 12  (full flight to basement )   Stair Railings at Home outside, present at both sides;inside, present on left side   Transportation Available car   Living Environment Comment Pt lives alone in single story duplex with basement (laundry located in basement, full flight down with handrail), [address]  home. Pt lives with two \"big\" dogs and a cat. Pt notes that home is messy and that dogs remain home alone for long hours dt her work schedule so she is required to  \"messes\". Pt reporting minimal assist available with brothers living in [place] and friends assisting their families.    Self-Care   Dominant Hand right   Usual Activity Tolerance good   Current Activity Tolerance fair   Regular Exercise no   Equipment Currently Used at Home none   Activity/Exercise/Self-Care Comment Pt denies regular exercise - reports walking dogs at times. Pt was working FT PTA. Indep with mobility    Functional Level " Prior   Ambulation 0-->independent   Transferring 0-->independent   Toileting 0-->independent   Bathing 0-->independent   Dressing 0-->independent   Eating 0-->independent   Communication 0-->understands/communicates without difficulty   Swallowing 0-->swallows foods/liquids without difficulty   Cognition 0 - no cognition issues reported   Fall history within last six months no   Which of the above functional risks had a recent onset or change? ambulation;transferring   Prior Functional Level Comment indep with mobility and ADLs prior to admit   General Information   Onset of Illness/Injury or Date of Surgery - Date 07/02/17  (hospital admit )   Referring Physician Estefania Hayden MD   Patient/Family Goals Statement none stated   Pertinent History of Current Problem (include personal factors and/or comorbidities that impact the POC) 62 year old female with h/o right salpingo-oopherectomy (2007), DVT (diagnosed 6/5/2017) on xarelto, DM2, HLD, admitted for evaluation of pelvic mass on CT concerning for gyn malignancy, on OR today with gyn/onc for both diagnosis and treatment. Also managing LUE abscess s/p I&D 7/2. Is now S/p exploratory laparotomy, HELENE,Left salpingo-oophorectomy.    Precautions/Limitations fall precautions   Weight-Bearing Status - LUE full weight-bearing   Weight-Bearing Status - RUE full weight-bearing   Weight-Bearing Status - LLE full weight-bearing   Weight-Bearing Status - RLE full weight-bearing   General Info Comments activity: up with assist    Cognitive Status Examination   Orientation orientation to person, place and time   Level of Consciousness alert   Follows Commands and Answers Questions 100% of the time   Personal Safety and Judgment intact   Memory intact   Pain Assessment   Patient Currently in Pain Yes, see Vital Sign flowsheet   Integumentary/Edema   Integumentary/Edema no deficits were identifed   Posture    Posture Not impaired   Range of Motion (ROM)   ROM Comment WFL   Strength  "  Strength Comments at least 3/5    Bed Mobility   Bed Mobility Comments mod I - utilized bed rail for rolling    Transfer Skills   Transfer Comments required HOB to be slightly elevated and use of bedrail for UE support to perform supine<>sit. Relies heavily on UE for STS transfers    Gait   Gait Comments dec gait speed and step length ijn, utilized IV pole for support, limited by pain    Balance   Balance Comments Not formally assessed, no overt LOB    Sensory Examination   Sensory Perception no deficits were identified   Coordination   Coordination no deficits were identified   Muscle Tone   Muscle Tone no deficits were identified   General Therapy Interventions   Planned Therapy Interventions balance training;gait training;neuromuscular re-education;strengthening;transfer training;progressive activity/exercise;home program guidelines   Clinical Impression   Criteria for Skilled Therapeutic Intervention yes, treatment indicated   PT Diagnosis Dec functional mobility   Influenced by the following impairments pain, dec strength, dec activity tolerance    Functional limitations due to impairments gait, transfers, balance, bed mobility, edurance    Clinical Presentation Stable/Uncomplicated   Clinical Presentation Rationale clinical judgement    Clinical Decision Making (Complexity) Low complexity   Therapy Frequency` 5 times/week   Predicted Duration of Therapy Intervention (days/wks) 7/16/17   Anticipated Equipment Needs at Discharge (TBD)   Anticipated Discharge Disposition Transitional Care Facility   Risk & Benefits of therapy have been explained Yes   Patient, Family & other staff in agreement with plan of care Yes   Clinical Impression Comments PT eval completed, tx inditiated    [facility] AM-PAC TM \"6 Clicks\"   2016, Trustees of [facility], under license to [employer].  All rights reserved.   6 Clicks Short Forms Basic Mobility Inpatient Short Form   [facility] AM-PAC  \"6 Clicks\" V.2 " Basic Mobility Inpatient Short Form   1. Turning from your back to your side while in a flat bed without using bedrails? 4 - None   2. Moving from lying on your back to sitting on the side of a flat bed without using bedrails? 3 - A Little   3. Moving to and from a bed to a chair (including a wheelchair)? 4 - None   4. Standing up from a chair using your arms (e.g., wheelchair, or bedside chair)? 4 - None   5. To walk in hospital room? 4 - None   6. Climbing 3-5 steps with a railing? 2 - A Lot   Basic Mobility Raw Score (Score out of 24.Lower scores equate to lower levels of function) 21   Total Evaluation Time   Total Evaluation Time (Minutes) 6[EW1.1]        Revision History        User Key Date/Time User Provider Type Action    > EW1.1 7/9/2017  1:29 PM Airam Miller PT Physical Therapist Sign                                                      INTERAGENCY TRANSFER FORM - LAB / IMAGING / EKG / EMG RESULTS   7/2/2017                       [address] [place] [address] BANK: [phone]            Unresulted Labs     None         Lab Results - 3 Days      Glucose by meter [[id number]] (Abnormal)  Resulted: 07/12/17 0041, Result status: Final result    Ordering provider: Serena Cole MD  07/11/17 2152 Resulting lab: POINT OF CARE TEST, GLUCOSE    Specimen Information    Type Source Collected On     07/11/17 2152          Components       Value Reference Range Flag Lab   Glucose 121 70 - 99 mg/dL H 170            Glucose by meter [[id number]] (Abnormal)  Resulted: 07/12/17 0041, Result status: Final result    Ordering provider: Serena Cole MD  07/11/17 1852 Resulting lab: POINT OF CARE TEST, GLUCOSE    Specimen Information    Type Source Collected On     07/11/17 1852          Components       Value Reference Range Flag Lab   Glucose 120 70 - 99 mg/dL H 170            Glucose by meter [[id number]] (Abnormal)  Resulted: 07/12/17 0041, Result status: Final result    Ordering provider: Serena Cole  MD  07/11/17 1245 Resulting lab: POINT OF CARE TEST, GLUCOSE    Specimen Information    Type Source Collected On     07/11/17 1245          Components       Value Reference Range Flag Lab   Glucose 142 70 - 99 mg/dL H 170   Comment:  Dr/RN Notified            Glucose by meter [[id number]] (Abnormal)  Resulted: 07/11/17 0900, Result status: Final result    Ordering provider: Serena Cole MD  07/11/17 0834 Resulting lab: POINT OF CARE TEST, GLUCOSE    Specimen Information    Type Source Collected On     07/11/17 0834          Components       Value Reference Range Flag Lab   Glucose 144 70 - 99 mg/dL H 170            UA with Microscopic reflex to Culture [[id number]] (Abnormal)  Resulted: 07/11/17 0633, Result status: Final result    Ordering provider: Penny Quan MD  07/11/17 0358 Resulting lab: [facility]    Specimen Information    Type Source Collected On   Urine Urine Midstream 07/11/17 0617          Components       Value Reference Range Flag Lab   Color Urine Yellow   51   Appearance Urine Clear   51   Glucose Urine Negative NEG mg/dL  51   Bilirubin Urine Negative NEG  51   Ketones Urine Negative NEG mg/dL  51   Specific Gravity Urine 1.008 1.003 - 1.035  51   Blood Urine Negative NEG  51   pH Urine 5.0 5.0 - 7.0 pH  51   Protein Albumin Urine Negative NEG mg/dL  51   Urobilinogen mg/dL Normal 0.0 - 2.0 mg/dL  51   Nitrite Urine Negative NEG  51   Leukocyte Esterase Urine Negative NEG  51   Source Midstream Urine   51   WBC Urine 1 0 - 2 /HPF  51   RBC Urine 1 0 - 2 /HPF  51   Bacteria Urine Few NEG /HPF A 51   Squamous Epithelial /HPF Urine <1 0 - 1 /HPF  51   Mucous Urine Present NEG /LPF A 51            Basic metabolic panel [[id number]] (Abnormal)  Resulted: 07/11/17 0523, Result status: Final result    Ordering provider: Corry Segura MD  07/10/17 2300 Resulting lab: [facility]    Specimen Information    Type Source  Collected On   Blood  07/11/17 0426          Components       Value Reference Range Flag Lab   Sodium 140 133 - 144 mmol/L  51   Potassium 3.7 3.4 - 5.3 mmol/L  51   Chloride 103 94 - 109 mmol/L  51   Carbon Dioxide 31 20 - 32 mmol/L  51   Anion Gap 6 3 - 14 mmol/L  51   Glucose 128 70 - 99 mg/dL H 51   Urea Nitrogen 14 7 - 30 mg/dL  51   Creatinine 1.00 0.52 - 1.04 mg/dL  51   GFR Estimate 56 >60 mL/min/1.7m2 L 51   Comment:  Non  GFR Calc   GFR Estimate If Black 68 >60 mL/min/1.7m2  51   Comment:  African American GFR Calc   Calcium 8.4 8.5 - 10.1 mg/dL L 51            CBC with platelets [[id number]] (Abnormal)  Resulted: 07/11/17 0459, Result status: Final result    Ordering provider: Corry Segura MD  07/10/17 2300 Resulting lab: [facility]    Specimen Information    Type Source Collected On   Blood  07/11/17 0426          Components       Value Reference Range Flag Lab   WBC 6.8 4.0 - 11.0 10e9/L  51   RBC Count 2.77 3.8 - 5.2 10e12/L L 51   Hemoglobin 7.4 11.7 - 15.7 g/dL L 51   Hematocrit 24.2 35.0 - 47.0 % L 51   MCV 87 78 - 100 fl  51   MCH 26.7 26.5 - 33.0 pg  51   MCHC 30.6 31.5 - 36.5 g/dL L 51   RDW 14.6 10.0 - 15.0 %  51   Platelet Count 370 150 - 450 10e9/L  51            Glucose by meter [[id number]] (Abnormal)  Resulted: 07/11/17 0301, Result status: Final result    Ordering provider: Serena Cole MD  07/11/17 0258 Resulting lab: POINT OF CARE TEST, GLUCOSE    Specimen Information    Type Source Collected On     07/11/17 0258          Components       Value Reference Range Flag Lab   Glucose 123 70 - 99 mg/dL H 170            Glucose by meter [[id number]] (Abnormal)  Resulted: 07/11/17 0011, Result status: Final result    Ordering provider: Serena Cole MD  07/10/17 2039 Resulting lab: POINT OF CARE TEST, GLUCOSE    Specimen Information    Type Source Collected On     07/10/17 2039          Components       Value Reference Range  Flag Lab   Glucose 115 70 - 99 mg/dL H 170   Comment:  /RN Notified            Glucose by meter [[id number]] (Abnormal)  Resulted: 07/11/17 0011, Result status: Final result    Ordering provider: Serena Cole MD  07/10/17 1159 Resulting lab: POINT OF CARE TEST, GLUCOSE    Specimen Information    Type Source Collected On     07/10/17 1159          Components       Value Reference Range Flag Lab   Glucose 123 70 - 99 mg/dL H 170            Glucose by meter [[id number]] (Abnormal)  Resulted: 07/11/17 0011, Result status: Final result    Ordering provider: Serena Cole MD  07/10/17 1706 Resulting lab: POINT OF CARE TEST, GLUCOSE    Specimen Information    Type Source Collected On     07/10/17 1706          Components       Value Reference Range Flag Lab   Glucose 105 70 - 99 mg/dL H 170            Glucose by meter [[id number]] (Abnormal)  Resulted: 07/10/17 2241, Result status: Final result    Ordering provider: Serena Cole MD  07/10/17 2231 Resulting lab: POINT OF CARE TEST, GLUCOSE    Specimen Information    Type Source Collected On     07/10/17 2231          Components       Value Reference Range Flag Lab   Glucose 117 70 - 99 mg/dL H 170   Comment:  /RN Notified            Glucose by meter [[id number]] (Abnormal)  Resulted: 07/10/17 0840, Result status: Final result    Ordering provider: Serena Cole MD  07/10/17 0806 Resulting lab: POINT OF CARE TEST, GLUCOSE    Specimen Information    Type Source Collected On     07/10/17 0806          Components       Value Reference Range Flag Lab   Glucose 103 70 - 99 mg/dL H 170            Basic metabolic panel [[id number]] (Abnormal)  Resulted: 07/10/17 0838, Result status: Final result    Ordering provider: Penny Quan MD  07/10/17 0646 Resulting lab: [facility]    Specimen Information    Type Source Collected On   Blood  07/10/17 0807          Components       Value Reference Range Flag Lab    Sodium 141 133 - 144 mmol/L  51   Potassium 4.0 3.4 - 5.3 mmol/L  51   Chloride 106 94 - 109 mmol/L  51   Carbon Dioxide 32 20 - 32 mmol/L  51   Anion Gap 3 3 - 14 mmol/L  51   Glucose 108 70 - 99 mg/dL H 51   Urea Nitrogen 18 7 - 30 mg/dL  51   Creatinine 1.17 0.52 - 1.04 mg/dL H 51   GFR Estimate 47 >60 mL/min/1.7m2 L 51   Comment:  Non  GFR Calc   GFR Estimate If Black 56 >60 mL/min/1.7m2 L 51   Comment:  African American GFR Calc   Calcium 8.6 8.5 - 10.1 mg/dL  51            CBC with platelets [[id number]] (Abnormal)  Resulted: 07/10/17 0820, Result status: Final result    Ordering provider: Penny Quan MD  07/10/17 0646 Resulting lab: [facility]    Specimen Information    Type Source Collected On   Blood  07/10/17 0807          Components       Value Reference Range Flag Lab   WBC 8.7 4.0 - 11.0 10e9/L  51   RBC Count 2.80 3.8 - 5.2 10e12/L L 51   Hemoglobin 7.6 11.7 - 15.7 g/dL L 51   Hematocrit 24.8 35.0 - 47.0 % L 51   MCV 89 78 - 100 fl  51   MCH 27.1 26.5 - 33.0 pg  51   MCHC 30.6 31.5 - 36.5 g/dL L 51   RDW 15.0 10.0 - 15.0 %  51   Platelet Count 343 150 - 450 10e9/L  51            Glucose by meter [[id number]] (Abnormal)  Resulted: 07/10/17 0246, Result status: Final result    Ordering provider: Serena Cole MD  07/10/17 0238 Resulting lab: POINT OF CARE TEST, GLUCOSE    Specimen Information    Type Source Collected On     07/10/17 0238          Components       Value Reference Range Flag Lab   Glucose 123 70 - 99 mg/dL H 170            Glucose by meter [[id number]] (Abnormal)  Resulted: 07/10/17 0236, Result status: Final result    Ordering provider: Serena Cole MD  07/08/17 4417 Resulting lab: POINT OF CARE TEST, GLUCOSE    Specimen Information    Type Source Collected On     07/08/17 8935          Components       Value Reference Range Flag Lab   Glucose 145 70 - 99 mg/dL H 170            Glucose by meter [[id number]]  (Abnormal)  Resulted: 07/09/17 2041, Result status: Final result    Ordering provider: Serena Cole MD  07/09/17 2034 Resulting lab: POINT OF CARE TEST, GLUCOSE    Specimen Information    Type Source Collected On     07/09/17 2034          Components       Value Reference Range Flag Lab   Glucose 118 70 - 99 mg/dL H 170   Comment:  Dr/RN Notified            Glucose by meter [[id number]] (Abnormal)  Resulted: 07/09/17 1650, Result status: Final result    Ordering provider: Serena Cole MD  07/09/17 1644 Resulting lab: POINT OF CARE TEST, GLUCOSE    Specimen Information    Type Source Collected On     07/09/17 1644          Components       Value Reference Range Flag Lab   Glucose 119 70 - 99 mg/dL H 170   Comment:  /RN Notified            Glucose by meter [[id number]] (Abnormal)  Resulted: 07/09/17 1205, Result status: Final result    Ordering provider: Serena Cole MD  07/09/17 1159 Resulting lab: POINT OF CARE TEST, GLUCOSE    Specimen Information    Type Source Collected On     07/09/17 1159          Components       Value Reference Range Flag Lab   Glucose 134 70 - 99 mg/dL H 170   Comment:  /RN Notified            Blood culture [[id number]]  Resulted: 07/09/17 1004, Result status: Final result    Ordering provider: Thelma Lopez MD  07/02/17 2242 Resulting lab: [facility]    Specimen Information    Type Source Collected On   Blood Arm, Left 07/02/17 2253          Components       Value Reference Range Flag Lab   Specimen Description Blood Left Arm   75   Special Requests Aerobic and anaerobic bottles received   75   Culture Micro No growth   75   Micro Report Status FINAL 07/09/2017   75            Blood culture [[id number]]  Resulted: 07/09/17 1004, Result status: Final result    Ordering provider: Thelma Lopez MD  07/02/17 2242 Resulting lab: [facility]    Specimen Information    Type Source Collected  On   Blood Arm, Right 07/02/17 2302          Components       Value Reference Range Flag Lab   Specimen Description Blood Right Arm   75   Special Requests Aerobic and anaerobic bottles received   75   Culture Micro No growth   75   Micro Report Status FINAL 07/09/2017   75            Glucose by meter [[id number]] (Abnormal)  Resulted: 07/09/17 0816, Result status: Final result    Ordering provider: eSrena Cole MD  07/09/17 0803 Resulting lab: POINT OF CARE TEST, GLUCOSE    Specimen Information    Type Source Collected On     07/09/17 0803          Components       Value Reference Range Flag Lab   Glucose 182 70 - 99 mg/dL H 170   Comment:  /RN Notified            Glucose by meter [[id number]] (Abnormal)  Resulted: 07/09/17 0255, Result status: Final result    Ordering provider: Serena Cole MD  07/09/17 0204 Resulting lab: POINT OF CARE TEST, GLUCOSE    Specimen Information    Type Source Collected On     07/09/17 0204          Components       Value Reference Range Flag Lab   Glucose 139 70 - 99 mg/dL H 170            Glucose by meter [[id number]] (Abnormal)  Resulted: 07/09/17 0010, Result status: Final result    Ordering provider: Serena Cole MD  07/08/17 2153 Resulting lab: POINT OF CARE TEST, GLUCOSE    Specimen Information    Type Source Collected On     07/08/17 2153          Components       Value Reference Range Flag Lab   Glucose 133 70 - 99 mg/dL H 170            Testing Performed By     Lab - Abbreviation Name Director Address Valid Date Range    51 - Unknown [facility] Unknown [address] [id number] 12/31/14 1010 - Present    75 - Unknown [facility] Unknown [address] [facility] [id number] 01/15/15 1019 - Present    170 - Unknown POINT OF CARE TEST, GLUCOSE Unknown Unknown 10/31/11 1114 - Present            Encounter-Level Documents:     There are no encounter-level documents.       Order-Level Documents:     There are no order-level documents.

## 2017-07-02 NOTE — IP AVS SNAPSHOT
` `     [address] [facility] BANK: [phone]            Medication Administration Report for Hafsa Corona as of 07/12/17 1208   Legend:    Given Hold Not Given Due Canceled Entry Other Actions    Time Time (Time) Time  Time-Action       Inactive    Active    Linked        Medications 07/06/17 07/07/17 07/08/17 07/09/17 07/10/17 07/11/17 07/12/17    acetaminophen (TYLENOL) tablet 650 mg  Dose: 650 mg Freq: EVERY 6 HOURS Route: PO  Start: 07/07/17 2100   Admin Instructions: Do NOT use if patient has an active opioid/acetaminophen analgesic order for pain  Maximum acetaminophen dose from all sources = 75 mg/kg/day not to exceed 4 grams/day.      2100 (650 mg)-Given        0234 (650 mg)-Given       0840 (650 mg)-Given       1439 (650 mg)-Given       2147 (650 mg)-Given        0420 (650 mg)-Given       1045 (650 mg)-Given       1629 (650 mg)-Given       2113 (650 mg)-Given        0459 (650 mg)-Given       1024 (650 mg)-Given       1725 (650 mg)-Given        0029 (650 mg)-Given       0611 (650 mg)-Given       (1319)-Not Given       1609 (650 mg)-Given       2150 (650 mg)-Given        0442 (650 mg)-Given       0915 (650 mg)-Given       [ ] 1600       [ ] 2200           albuterol (PROAIR HFA/PROVENTIL HFA/VENTOLIN HFA) Inhaler 2 puff  Dose: 2 puff Freq: 4 TIMES DAILY PRN Route: IN  PRN Reason: shortness of breath / dyspnea  Start: 07/03/17 0325     1056-Auto Hold       1505 (6 puff)-Given       1744 (6 puff)-Given       2038-Unhold                atorvastatin (LIPITOR) tablet 80 mg  Dose: 80 mg Freq: DAILY Route: PO  Start: 07/03/17 0800    1901 (80 mg)-Given        1056-Auto Hold       2000-Automatically Held       2038-Unhold       2325 (80 mg)-Given        2009 (80 mg)-Given        2104 (80 mg)-Given        2035 (80 mg)-Given        1936 (80 mg)-Given        [ ] 2000           benzocaine-menthol (CHLORASEPTIC) 6-10 MG lozenge 1-2 lozenge  Dose: 1-2 lozenge Freq: EVERY 1 HOUR PRN Route: BU  PRN Reason: sore throat  PRN  Comment: without fever  Start: 07/07/17 2046              bisacodyl (DULCOLAX) Suppository 10 mg  Dose: 10 mg Freq: DAILY PRN Route: RE  PRN Reason: constipation  Start: 07/11/17 0047         0115 (10 mg)-Given            cholecalciferol (vitamin D) tablet 2,000 Units  Dose: 2,000 Units Freq: DAILY Route: PO  Start: 07/03/17 0800    0916 (2,000 Units)-Given        (0840)-Not Given       1056-Auto Hold       2038-Unhold        1041 (2,000 Units)-Given        1046 (2,000 Units)-Given        0811 (2,000 Units)-Given        0842 (2,000 Units)-Given        1026 (2,000 Units)-Given           escitalopram (LEXAPRO) tablet 20 mg  Dose: 20 mg Freq: DAILY Route: PO  Start: 07/03/17 0800    0917 (20 mg)-Given        0837 (20 mg)-Given       1056-Auto Hold       2038-Unhold        0838 (20 mg)-Given        0832 (20 mg)-Given        0811 (20 mg)-Given        0841 (20 mg)-Given        1022 (20 mg)-Given           fluticasone (FLONASE) 50 MCG/ACT spray 2 spray  Dose: 2 spray Freq: DAILY Route: BOTH NOSTRIL  Start: 07/03/17 0800    0917 (2 spray)-Given        0839 (2 spray)-Given       1056-Auto Hold       2038-Unhold        0846 (2 spray)-Given        0824 (2 spray)-Given        0811 (2 spray)-Given        0840 (2 spray)-Given        0910 (2 spray)-Given           glucose 40 % gel 15-30 g  Dose: 15-30 g Freq: EVERY 15 MIN PRN Route: PO  PRN Reason: low blood sugar  Start: 07/03/17 0325   Admin Instructions: Give 15 g for BG 51 to 69 mg/dL IF patient is conscious and able to swallow. Give 30 g for BG less than or equal to 50 mg/dL IF patient is conscious and able to swallow. Do NOT give glucose gel via enteral tube.  IF patient has enteral tube: give apple juice 120 mL (4 oz or 15 g of CHO) via enteral tube for BG 51 to 69 mg/dL.  Give apple juice 240 mL (8 oz or 30 g of CHO) via enteral tube for BG less than or equal to 50 mg/dL.    ~Oral gel is preferable for conscious and able to swallow patient.   ~IF gel unavailable or patient  refuses may provide apple juice 120 mL (4 oz or 15 g of CHO). Document juice on I and O flowsheet.      1056-Auto Hold       2038-Unhold               Or  dextrose 50 % injection 25-50 mL  Dose: 25-50 mL Freq: EVERY 15 MIN PRN Route: IV  PRN Reason: low blood sugar  Start: 07/03/17 0325   Admin Instructions: Use if have IV access, BG less than 70 mg/dL and meet dose criteria below:  Dose if conscious and alert (or disorientated) and NPO = 25 mL  Dose if unconscious / not alert = 50 mL  Vesicant.      1056-Auto Hold       2038-Unhold               Or  glucagon injection 1 mg  Dose: 1 mg Freq: EVERY 15 MIN PRN Route: SC  PRN Reason: low blood sugar  PRN Comment: May repeat x 1 only  Start: 07/03/17 0325   Admin Instructions: May give SQ or IM. ONLY use glucagon IF patient has NO IV access AND is UNABLE to swallow AND blood glucose is LESS than or EQUAL to 50 mg/dL.      1056-Auto Hold       2038-Unhold                HYDROmorphone (DILAUDID) tablet 4-6 mg  Dose: 4-6 mg Freq: EVERY 3 HOURS PRN Route: PO  PRN Reason: moderate to severe pain  Start: 07/11/17 0800         0938 (4 mg)-Given       1541 (2 mg)-Given       1608 (2 mg)-Given       1854 (2 mg)-Given [C]       2150 (2 mg)-Given [C]       2242 (2 mg)-Given        0130 (4 mg)-Given       0441 (4 mg)-Given       0915 (4 mg)-Given           insulin aspart (NovoLOG) inj (RAPID ACTING)  Dose: 1-5 Units Freq: AT BEDTIME Route: SC  Start: 07/03/17 0330   Admin Instructions: MEDIUM INSULIN RESISTANCE DOSING    Do Not give Bedtime Correction Insulin if BG less than  200.   For  - 249 give 1 units.   For  - 299 give 2 units.   For  - 349 give 3 units.   For  -399 give 4 units.   For BG greater than or equal to 400 give 5 units.  Notify provider if glucose greater than or equal to 350 mg/dL after administration of correction dose.  If given at mealtime, must be administered 5 min before meal or immediately after.     (0976)-Not Given [C]         "1056-Auto Hold       2038-Unhold       (2232)-Not Given        (2153)-Not Given        (2106)-Not Given        (2251)-Not Given        (2322)-Not Given [C]        [ ] 2200           insulin aspart (NovoLOG) inj (RAPID ACTING)  Dose: 1-7 Units Freq: 3 TIMES DAILY BEFORE MEALS Route: SC  Start: 07/03/17 0730   Admin Instructions: Correction Scale - MEDIUM INSULIN RESISTANCE DOSING     Do Not give Correction Insulin if Pre-Meal BG less than 140.   For Pre-Meal  - 189 give 1 unit.   For Pre-Meal  - 239 give 2 units.   For Pre-Meal  - 289 give 3 units.   For Pre-Meal  - 339 give 4 units.   For Pre-Meal - 399 give 5 units.   For Pre-Meal -449 give 6 units  For Pre-Meal BG greater than or equal to 450 give 7 units.   To be given with prandial insulin, and based on pre-meal blood glucose.    Notify provider if glucose greater than or equal to 350 mg/dL after administration of correction dose.  If given at mealtime, must be administered 5 min before meal or immediately after.     (0916)-Not Given [C]       (1132)-Not Given [C]       (1830)-Not Given [C]        (0838)-Not Given [C]       1056-Auto Hold       1200-Automatically Held       1700-Automatically Held       2038-Unhold        (0808)-Not Given       1228 (1 Units)-Given [C]       1741 (1 Units)-Given        0822 (1 Units)-Given       (1209)-Not Given       (1700)-Not Given        (0818)-Not Given [C]       (1253)-Not Given [C]       (1716)-Not Given [C]        0835 (1 Units)-Given [C]       1318 (1 Units)-Given       (1925)-Not Given [C]        (0909)-Not Given       (1154)-Not Given       [ ] 1700           lidocaine (LMX4) kit  Freq: EVERY 1 HOUR PRN Route: Top  PRN Reason: pain  PRN Comment: with VAD insertion or accessing implanted port.  Start: 07/07/17 2046   Admin Instructions: Do NOT give if patient has a history of allergy to any local anesthetic or any \"ti\" product.   Apply 30 minutes prior to VAD insertion or port " "access.  MAX Dose:  2.5 g (  of 5 g tube)               lidocaine 1 % 1 mL  Dose: 1 mL Freq: EVERY 1 HOUR PRN Route: OTHER  PRN Comment: mild pain with VAD insertion or accessing implanted port  Start: 07/07/17 2046   Admin Instructions: Do NOT give if patient has a history of allergy to any local anesthetic or any \"ti\" product. MAX dose 1 mL subcutaneous OR intradermal in divided doses.               magnesium hydroxide (MILK OF MAGNESIA) suspension 30 mL  Dose: 30 mL Freq: DAILY PRN Route: PO  PRN Reason: constipation  Start: 07/11/17 0045   Admin Instructions: Shake well.          0114 (30 mL)-Given            multivitamin, therapeutic with minerals (THERA-VIT-M) tablet 1 tablet  Dose: 1 tablet Freq: DAILY Route: PO  Start: 07/04/17 0800    0916 (1 tablet)-Given        (0839)-Not Given       1056-Auto Hold       2038-Unhold        1040 (1 tablet)-Given [C]        1047 (1 tablet)-Given       1048 (1 tablet)-Given        0810 (1 tablet)-Given        0841 (1 tablet)-Given        1025 (1 tablet)-Given           naloxone (NARCAN) injection 0.1-0.4 mg  Dose: 0.1-0.4 mg Freq: EVERY 2 MIN PRN Route: IV  PRN Reason: opioid reversal  Start: 07/11/17 0809   Admin Instructions: For respiratory rate LESS than or EQUAL to 8.  Partial reversal dose:  0.1 mg titrated q 2 minutes for Analgesia Side Effects Monitoring Sedation Level of 3 (frequently drowsy, arousable, drifts to sleep during conversation).Full reversal dose:  0.4 mg bolus for Analgesia Side Effects Monitoring Sedation Level of 4 (somnolent, minimal or no response to stimulation).               ondansetron (ZOFRAN-ODT) ODT tab 4 mg  Dose: 4 mg Freq: EVERY 6 HOURS PRN Route: PO  PRN Reasons: nausea,vomiting  Start: 07/11/17 1312   Admin Instructions: With dry hands, peel back foil backing and gently remove tablet; do not push oral disintegrating tablet through foil backing; administer immediately on tongue and oral disintegrating tablet dissolves in seconds; then " swallow with saliva; liquid not required.          1317 (4 mg)-Given       2150 (4 mg)-Given            polyethylene glycol (MIRALAX/GLYCOLAX) Packet 17 g  Dose: 17 g Freq: 2 TIMES DAILY Route: PO  Start: 07/06/17 0930   Admin Instructions: 1 Packet = 17 grams. Mixed prescribed dose in 8 ounces of water. Follow with 8 oz. of water.     1128 (17 g)-Given       2101 (17 g)-Given        (0839)-Not Given       1056-Auto Hold       2000-Automatically Held       2038-Unhold        (0845)-Not Given       1044 (17 g)-Given       2009 (17 g)-Given        1048 (17 g)-Given       2105 (17 g)-Given        0809 (17 g)-Given       2034 (17 g)-Given        0840 (17 g)-Given       1936 (17 g)-Given        (0917)-Not Given [C]       [ ] 2000           rivaroxaban ANTICOAGULANT (XARELTO) tablet 20 mg  Dose: 20 mg Freq: DAILY WITH SUPPER Route: PO  Start: 07/09/17 2200       2113 (20 mg)-Given        2035 (20 mg)-Given        1927 (20 mg)-Given        [ ] 1700           senna-docusate (SENOKOT-S;PERICOLACE) 8.6-50 MG per tablet 2 tablet  Dose: 2 tablet Freq: 2 TIMES DAILY Route: PO  Start: 07/06/17 2000   Admin Instructions: Start with 1 tablet PO BID, If no bowel movement in 24 hours, increase to 2 tablets PO BID.  Hold for loose stools.     2101 (2 tablet)-Given        (0840)-Not Given       1056-Auto Hold       2000-Automatically Held       2038-Unhold       2325 (2 tablet)-Given        1036 (2 tablet)-Given       2009 (2 tablet)-Given        0834 (2 tablet)-Given       2104 (2 tablet)-Given        0810 (2 tablet)-Given       2035 (2 tablet)-Given        0841 (2 tablet)-Given       1936 (2 tablet)-Given        1023 (1 tablet)-Given [C]       [ ] 2000           simethicone (MYLICON) chewable tablet 80 mg  Dose: 80 mg Freq: 4 TIMES DAILY PRN Route: PO  PRN Reason: cramping  PRN Comment: gas  Start: 07/07/17 2046          0134 (80 mg)-Given           sodium chloride (PF) 0.9% PF flush 3 mL  Dose: 3 mL Freq: EVERY 8 HOURS Route:  IK  Start: 07/07/17 2100   Admin Instructions: And Q1H PRN, to lock peripheral IV dormant line.      (2114)-Not Given        (0429)-Not Given       (1212)-Not Given       2147 (3 mL)-Given        (0836)-Not Given [C]       (1801)-Not Given [C]        (0320)-Not Given       0804 (3 mL)-Given       1629 (3 mL)-Given        0040 (3 mL)-Given       0844 (3 mL)-Given       1929 (3 mL)-Given        (0000)-Not Given       (1026)-Not Given [C]       [ ] 1600           sodium chloride (PF) 0.9% PF flush 3 mL  Dose: 3 mL Freq: EVERY 1 HOUR PRN Route: IK  PRN Reason: line flush  PRN Comment: for peripheral IV flush post IV meds  Start: 07/07/17 2046              vitamin B complex with vitamin C (STRESS TAB) tablet 1 tablet  Dose: 1 tablet Freq: DAILY Route: PO  Start: 07/04/17 0800    0917 (1 tablet)-Given        (0840)-Not Given       1056-Auto Hold       2038-Unhold        1041 (1 tablet)-Given        1046 (1 tablet)-Given        0810 (1 tablet)-Given        0841 (1 tablet)-Given        1025 (1 tablet)-Given          Discontinued Medications  Medications 07/06/17 07/07/17 07/08/17 07/09/17 07/10/17 07/11/17 07/12/17         Dose: 1 tablet Freq: EVERY 12 HOURS SCHEDULED Route: PO  Indications of Use: SKIN AND SOFT TISSUE INFECTION  Indications Comment: Dog bite  Start: 07/04/17 1115   End: 07/11/17 0707    0916 (1 tablet)-Given       1904 (1 tablet)-Given        0837 (1 tablet)-Given       1056-Auto Hold       2000-Automatically Held       2038-Unhold       2325 (1 tablet)-Given        0842 (1 tablet)-Given       2009 (1 tablet)-Given        0839 (1 tablet)-Given       2104 (1 tablet)-Given        0810 (1 tablet)-Given       2035 (1 tablet)-Given        0707-Med Discontinued          Rate: 14 mL/hr Freq: CONTINUOUS Route: EP  Start: 07/07/17 2145   End: 07/10/17 0644   Admin Instructions: Absolutely no anticoagulants, thrombolytics or antiplatelet medications or other opioid analgesics or other sedatives without prior  notification of anesthesiology. For CADD cassettes, pharmacy to send epidural tubing set Ref # [id number] (5.1mL).      2231 ( )-Rate/Dose Verify        0110 ( )-Rate/Dose Verify [C]       1848 ( )-New Syringe/Cartridge       2000 ( )-Rate/Dose Verify        0508 ( )-New Bag [C]       0649 ( )-Rate/Dose Change        0644-Med Discontinued           Dose: 0.2 mg Freq: EVERY 1 MIN PRN Route: IV  PRN Reason: benzodiazepine reversal  Start: 07/07/17 1107   End: 07/10/17 0644   Admin Instructions: Give over 15 seconds. If inadequate response after 45 seconds, may repeat up to a MAX total dose of 1 mg. Continue monitoring until discharge criteria met of minimum of 2 hours.  Irritant.         0644-Med Discontinued           Dose: 200 mg Freq: 3 TIMES DAILY PRN Route: PO  PRN Reason: other  PRN Comment: pain  Start: 07/09/17 2115   End: 07/10/17 0643        0643-Med Discontinued           Dose: 200 mg Freq: 3 TIMES DAILY Route: PO  Start: 07/08/17 2245   End: 07/09/17 2110      2311 (200 mg)-Given        0832 (200 mg)-Given       1431 (200 mg)-Given       2110-Med Discontinued  (2113)-Not Given                Dose: 2-4 mg Freq: EVERY 3 HOURS PRN Route: PO  PRN Reason: moderate to severe pain  Start: 07/09/17 1728   End: 07/11/17 0800       2119 (2 mg)-Given        0054 (4 mg)-Given       0459 (4 mg)-Given       0812 (4 mg)-Given       1313 (2 mg)-Given       1543 (2 mg)-Given       1725 (2 mg)-Given       2030 (2 mg)-Given [C]       2140 (2 mg)-Given        0029 (4 mg)-Given       0341 (2 mg)-Given       0613 (2 mg)-Given       0646 (2 mg)-Given       0800-Med Discontinued          Rate: 125 mL/hr Freq: CONTINUOUS Route: IV  Start: 07/07/17 1815   End: 07/10/17 0659     2048 ( )-New Bag        0234 ( )-New Bag       1038 ( )-New Bag       1845 ( )-New Bag        0208 ( )-New Bag       1019 ( )-New Bag       1826 ( )-New Bag        0112 ( )-New Bag       0659-Med Discontinued           Freq: CONTINUOUS PRN Route: XX  Start:  07/07/17 2130   End: 07/10/17 0644   Admin Instructions: -All epidural medications must be preservative free  -All orders must be compounded in preservative free Normal Saline  -Absolutely no other opioid analgesics or other sedatives to be administered unless approved by Anesthesia Service  -Absolutely no anticoagulants, thrombolytics, or antiplatelet medications or other opioid analgesics or other sedatives without prior notification of Anesthesia Service  -All patients who receive epidural medications must have IV access.         0644-Med Discontinued           Freq: CONTINUOUS PRN Route: XX  Start: 07/07/17 1652   End: 07/10/17 0644   Admin Instructions: -All epidural medications must be preservative free  -All orders must be compounded in preservative free Normal Saline  -Absolutely no other opioid analgesics or other sedatives to be administered unless approved by Anesthesia Service  -Absolutely no anticoagulants, thrombolytics, or antiplatelet medications or other opioid analgesics or other sedatives without prior notification of Anesthesia Service  -All patients who receive epidural medications must have IV access.         0644-Med Discontinued           Dose: 10 mg Freq: EVERY 6 HOURS PRN Route: PO  PRN Comment: nausea and vomiting  Start: 07/07/17 2356   End: 07/10/17 0644   Admin Instructions: This is Step 3 of nausea and vomiting management.  Give if nausea not resolved 15 minutes after giving prochlorperazine (COMPAZINE).  If nausea not resolved in 15-30 minutes, Notify provider.         0644-Med Discontinued        Or    Dose: 10 mg Freq: EVERY 6 HOURS PRN Route: IV  PRN Comment: nausea and vomiting  Start: 07/07/17 2356   End: 07/10/17 0644   Admin Instructions: This is Step 3 of nausea and vomiting management.  Give if nausea not resolved 15 minutes after giving prochlorperazine (COMPAZINE).  If nausea not resolved in 15-30 minutes, Notify provider.  Irritant.         0644-Med Discontinued            Dose: 2.5-5 mg Freq: EVERY 6 HOURS PRN Route: IV  PRN Reason: other  PRN Comment: for pruritus  Start: 07/07/17 1652   End: 07/10/17 0644   Admin Instructions: Give 2.5 mg initially. If pruritus persists after 30 minutes, may give additional 2.5 mg. If effective, then repeat effective dose Q6H PRN pruritus.         0644-Med Discontinued           Dose: 0.1-0.4 mg Freq: EVERY 2 MIN PRN Route: IV  PRN Reason: opioid reversal  Start: 07/07/17 2355   End: 07/10/17 0644   Admin Instructions: For respiratory rate LESS than or EQUAL to 8.  Partial reversal dose:  0.1 mg titrated q 2 minutes for Analgesia Side Effects Monitoring Sedation Level of 3 (frequently drowsy, arousable, drifts to sleep during conversation).Full reversal dose:  0.4 mg bolus for Analgesia Side Effects Monitoring Sedation Level of 4 (somnolent, minimal or no response to stimulation).         0644-Med Discontinued           Dose: 4 mg Freq: EVERY 6 HOURS PRN Route: PO  PRN Reasons: nausea,vomiting  Start: 07/07/17 2356   End: 07/10/17 0644   Admin Instructions: This is Step 1 of nausea and vomiting management.  If nausea not resolved in 15 minutes, go to Step 2 prochlorperazine (COMPAZINE). Do not push through foil backing. Peel back foil and gently remove. Place on tongue immediately. Administration with liquid unnecessary         0644-Med Discontinued        Or    Dose: 4 mg Freq: EVERY 6 HOURS PRN Route: IV  PRN Reasons: nausea,vomiting  Start: 07/07/17 2356   End: 07/10/17 0644   Admin Instructions: This is Step 1 of nausea and vomiting management.  If nausea not resolved in 15 minutes, go to Step 2 prochlorperazine (COMPAZINE).  Irritant.         0644-Med Discontinued           Dose: 5-10 mg Freq: EVERY 3 HOURS PRN Route: PO  PRN Reason: moderate to severe pain  Start: 07/09/17 0926   End: 07/09/17 1728       1025 (10 mg)-Given [C]       1629 (10 mg)-Given       1728-Med Discontinued            Dose: 5-10 mg Freq: EVERY 6 HOURS PRN Route:  IV  PRN Reasons: nausea,vomiting  Start: 07/07/17 2356   End: 07/10/17 0644   Admin Instructions: This is Step 2 of nausea and vomiting management.   If nausea not resolved in 15 minutes, give metoclopramide (REGLAN) if ordered (step 3 of nausea and vomiting management)         0644-Med Discontinued        Or    Dose: 5-10 mg Freq: EVERY 6 HOURS PRN Route: PO  PRN Reason: vomiting  Start: 07/07/17 2356   End: 07/10/17 0644   Admin Instructions: This is Step 2 of nausea and vomiting management.   If nausea not resolved in 15 minutes, give metoclopramide (REGLANI) if ordered (step 3 of nausea and vomiting management)         0644-Med Discontinued        Or    Dose: 25 mg Freq: EVERY 12 HOURS PRN Route: RE  PRN Reasons: nausea,vomiting  Start: 07/07/17 2356   End: 07/10/17 0644   Admin Instructions: This is Step 2 of nausea and vomiting management.   If nausea not resolved in 15 minutes, give metoclopramide (REGLAN) if ordered (step 3 of nausea and vomiting management)         0644-Med Discontinued           Dose: 1 tablet Freq: ONCE Route: PO  Start: 07/09/17 1930   End: 07/09/17 1929       1929-Med Discontinued       Medications 07/06/17 07/07/17 07/08/17 07/09/17 07/10/17 07/11/17 07/12/17

## 2017-07-02 NOTE — IP AVS SNAPSHOT
MRN:[id number]                      After Visit Summary   7/2/2017    Hafsa Corona    MRN: [id number]           Thank you!     Thank you for choosing [facility] for your care. Our goal is always to provide you with excellent care. Hearing back from our patients is one way we can continue to improve our services. Please take a few minutes to complete the written survey that you may receive in the mail after you visit with us. Thank you!        Patient Information     Date Of Birth          1955        Designated Caregiver       Most Recent Value    Caregiver    Will someone help with your care after discharge? no      About your hospital stay     You were admitted on:  July 3, 2017 You last received care in the:  [address] [facility]    You were discharged on:  July 12, 2017        Reason for your hospital stay       Surgery                  Who to Call     For medical emergencies, please call 911.  For non-urgent questions about your medical care, please call your primary care provider or clinic, [phone]  For questions related to your surgery, please call your surgery clinic        Attending Provider     Provider Specialty    Thelma Lopez MD Emergency Medicine    Kristal Mondragon MD [facility]    Darwin Antonio MD [facility]    Serena Cole MD OB/Gyn       Primary Care Provider Office Phone # Fax #    Morelia Ayon [phone]      After Care Instructions     Activity - Ambulate in hallway       Every shift            Activity - Up with nursing assistance           Additional Discharge Instructions       Elevate LE            Advance Diet as Tolerated       Follow this diet upon discharge: Regular diabetic diet            Encourage PO fluids           Fall precautions           General info for SNF       Length of Stay Estimate: Short Term Care: Estimated # of Days <30  Condition at Discharge: Stable  Level of care:skilled  "  Rehabilitation Potential: Good  Admission H&P remains valid and up-to-date: Yes  Recent Chemotherapy: N/A  Use Nursing Home Standing Orders: No            Glucose monitor nursing POCT       Before meals and at bedtime            Intake and output       Every shift            Mantoux instructions       Give two-step Mantoux (PPD) Per Facility Policy Yes            Oxygen - Nasal cannula       1-2 Lpm by nasal cannula to keep O2 sats 92% or greater.            Wound care (specify)       Site:   Abd midline inc  Instructions:  Keep c/d/i. Abd binder to be worn when oob. Monitor for signs of infection. Ok to shower and pat dry. Keep Open to air    Left thigh wound care instructions: Change dressing daily, clean with Microklenz, pack wound with 1/4\" Iodoform gauze, cover with Mepilex dressing                  Follow-up Appointments     Follow Up and recommended labs and tests       Follow up 7/31/17 Dr Shaffer - call prior to then for questions or concerns [phone].    Left thigh wound care instructions: Change dressing daily, clean with Microklenz, pack wound with 1/4\" Iodoform gauze, cover with Mepilex dressing    Left leg DVT: Continue taking Xarelto as prescribed by your hematologist. Follow up with your hematologist as previously scheduled for further management of your DVT.     GENERAL POST-OPERATIVE  PATIENT INSTRUCTIONS      FOLLOW-UP:    Call Surgeon if you have:  Temperature greater than 100.4  Persistent nausea and vomiting  Severe uncontrolled pain  Redness, tenderness, or signs of infection (pain, swelling, redness, odor or green/yellow discharge around the site)  Difficulty breathing, headache or visual disturbances  Hives  Persistent dizziness or light-headedness  Extreme fatigue  Any other questions or concerns you may have after discharge    In an emergency, call 911 or go to an Emergency Department at a nearby hospital       WOUND CARE INSTRUCTIONS:  Keep a dry clean dressing on the wound if " there is drainage. If you had a bandage initially, it may be removed after 24 hours.  Once the wound has quit draining you may leave it open to air.  If clothing rubs against the wound or causes irritation and the wound is not draining you may cover it with a dry dressing during the daytime.  Try to keep the wound dry and avoid ointments on the wound unless directed to do so.  If the wound becomes bright red and painful or starts to drain infected material that is not clear, please contact your physician immediately.    1.  You may shower 24 hrs after surgery   2.  No soaking in the tub for 4 weeks       DIET:  There are no dietary restrictions.  You may eat any foods that you can tolerate unless instructed otherwise.  It is a good idea to eat a high fiber diet and take in plenty of fluids to prevent constipation.  If you become constipated, please follow the instructions below.    ACTIVITY:  You are encouraged to cough, deep breath and use your incentive spirometer if you were given one, every 15-30 minutes when awake.  This will help prevent respiratory complications and low grade fevers post-operatively.  You may want to hug a pillow when coughing and sneezing to add additional support to the surgical area, if you had abdominal surgery, which will decrease pain during these times.      1.  No heavy lifting >20lbs or strenuous exercise for six-eight weeks.  No exercise in which you are using core muscles (yoga, pilates, swimming, weight lifting)  2.  You may walk as much as you wish.  You are encouraged to increase your activity each day after surgery.  Stairs are okay.   3.  Nothing per vagina for eight weeks.  No tampons, no intercourse, no douching.  You can expect some light vaginal spotting and discharge for up to six weeks.  If bleeding becomes heavy, please contact the office.     MEDICATIONS:  Try to take narcotic medications and anti-inflammatory medications, such as tylenol, ibuprofen, naprosyn, etc.,  with food.  This will minimize stomach upset from the medication.  Should you develop nausea and vomiting from the pain medication, or develop a rash, please discontinue the medication and contact your physician.  You should not drive, make important decisions, or operate machinery when taking narcotic pain medication.    OTHER:  Patients are often constipated after general anesthesia and surgery.  The patient should continue to take stool softeners (for example, Senokot-S) for the next six weeks (unless diarrhea develops) and consume adequate amounts of water.  If the patient remains constipated or unable to pass stool, please try one or all of the following measures:  1.  Milk of Magnesia 30cc twice a day as needed by mouth  2.  Metamucil 2 tablespoons in 12 ounces of fluid  3.  Dulcolax oral or suppositories  4.  Prunes or prune juice  5.  Miralax daily      QUESTIONS:  Please feel free to call your physician or the hospital  if you have any questions, and they will be glad to assist you.                  Your next 10 appointments already scheduled     Jul 31, 2017  3:00 PM CDT   (Arrive by 2:45 PM)   Return Visit with Jarod Shaffer MD   [facility] ([facility])    [address] [place] [id number] [phone]            Sep 15, 2017 12:15 PM CDT   LAB with  LAB   [facility])    [address] [place] [id number]   [phone]           Patient must bring picture ID.  Patient should be prepared to give a urine specimen  Please do not eat 10-12 hours before your appointment if you are coming in fasting for labs on lipids, cholesterol, or glucose (sugar).  Pregnant women should follow their Care Team instructions. Water with medications is okay. Do not drink coffee or other fluids.   If you have concerns about taking  your medications, please ask at office or if  scheduling via [facility], send a message by clicking on Secure Messaging, Message Your Care Team.            Sep 15, 2017 12:30 PM CDT   US LOWER EXTREMITY VENOUS DUPLEX LEFT with [id number]   [facility] ([facility])    [address] [place], [place] [id number] [phone]           Please bring a list of your medicines (including vitamins, minerals and over-the-counter drugs). Also, tell your doctor about any allergies you may have. Wear comfortable clothes and leave your valuables at home.  You do not need to do anything special to prepare for your exam.  Please call the Imaging Department at your exam site with any questions.            Sep 15, 2017  1:30 PM CDT   (Arrive by 1:15 PM)   RETURN CLOTTING DISORDER with William Villarreal MD   [facility] ([facility])    [address] [place] [id number] [phone]              Additional Services     Occupational Therapy Adult Consult       Evaluate and treat as clinically indicated.    Reason:  Weakness post op            Physical Therapy Adult Consult       Evaluate and treat as clinically indicated.    Reason:  Weakness post op                  Future tests that were ordered for you     ABO/Rh Type and Screen                 Additional Information     If you use hormonal birth control (such as the pill, patch, ring or implants): You'll need a second form of birth control for 7 days (condoms, a diaphragm or contraceptive foam). While in the hospital, you received a medicine called Bridion. Your normal birth control will not work as well for a week after taking this medicine.          Pending Results     Date and Time Order Name Status Description    7/7/2017 1436 Surgical pathology exam Preliminary             Statement of Approval     Ordered          07/12/17 1124  I have reviewed and agree with all the recommendations and orders  "detailed in this document.  EFFECTIVE NOW     Approved and electronically signed by:  Corry Segura MD             Admission Information     Date & Time Provider Department Dept. Phone    2017 Serena Cole MD [address] [facility] [phone]      Your Vitals Were     Blood Pressure Pulse Temperature Respirations Height Weight    116/68 (BP Location: Right arm) 96 98.1  F (36.7  C) (Oral) 17 1.613 m (5' 3.5\") 101.5 kg (223 lb 11.2 oz)    Pulse Oximetry BMI (Body Mass Index)                93% 39.01 kg/m2          MyChart Information     [facility] lets you send messages to your doctor, view your test results, renew your prescriptions, schedule appointments and more. To sign up, go to [email or web address] . Click on \"Log in\" on the left side of the screen, which will take you to the Welcome page. Then click on \"Sign up Now\" on the right side of the page.     You will be asked to enter the access code listed below, as well as some personal information. Please follow the directions to create your username and password.     Your access code is: [email or web address]  Expires: 10/10/2017 11:16 AM     Your access code will  in 90 days. If you need help or a new code, please call your [facility] clinic or [phone].        Care EveryWhere ID     This is your Care EveryWhere ID. This could be used by other organizations to access your [facility] medical records  [id number]        Equal Access to Services     [facility] CenterJULIO : Hadandres tellez Soaxel, waaxda luqadaha, qaybta kaalmada sujatha palm. So [facility] [phone].    ATENCIÓN: Si habla español, tiene a martínez disposición servicios gratuitos de asistencia lingüística. Llame al [phone].    We comply with applicable federal civil rights laws and [place] laws. We do not discriminate on the basis of race, color, national origin, age, disability sex, sexual orientation or gender identity.               Review of " your medicines      START taking        Dose / Directions    acetaminophen 325 MG tablet   Commonly known as:  TYLENOL   Used for:  Cellulitis and abscess of leg, Mass of pelvis        Dose:  650 mg   Take 2 tablets (650 mg) by mouth every 6 hours as needed for mild pain   Quantity:  100 tablet   Refills:  0       HYDROmorphone 4 MG tablet   Commonly known as:  DILAUDID   Used for:  Mass of pelvis, Cellulitis and abscess of leg        Dose:  4-6 mg   Take 1-1.5 tablets (4-6 mg) by mouth every 3 hours as needed for moderate to severe pain   Quantity:  50 tablet   Refills:  0       polyethylene glycol Packet   Commonly known as:  MIRALAX/GLYCOLAX   Used for:  Mass of pelvis        Dose:  17 g   Take 17 g by mouth daily   Quantity:  7 packet   Refills:  0       senna-docusate 8.6-50 MG per tablet   Commonly known as:  SENOKOT-S;PERICOLACE   Used for:  Mass of pelvis        Dose:  2 tablet   Take 2 tablets by mouth 2 times daily Start with 1 tablet PO BID, If no bowel movement in 24 hours, increase to 2 tablets PO BID.  Hold for loose stools.   Quantity:  100 tablet   Refills:  0         CONTINUE these medicines which have NOT CHANGED        Dose / Directions    albuterol 108 (90 BASE) MCG/ACT Inhaler   Commonly known as:  albuterol   Used for:  Cough        Dose:  2 puff   Inhale 2 puffs into the lungs 4 times daily as needed for shortness of breath / dyspnea   Quantity:  1 Inhaler   Refills:  PRN       atorvastatin 80 MG tablet   Commonly known as:  LIPITOR   Used for:  Hyperlipidemia LDL goal <100        Dose:  80 mg   Take 1 tablet (80 mg) by mouth daily   Quantity:  90 tablet   Refills:  PRN       CALCIUM-MAGNESIUM-VITAMIN D PO        Dose:  2 tablet   Take 2 tablets by mouth daily   Refills:  0       DIGESTIVE ENZYME PO        Dose:  1 capsule   Take 1 capsule by mouth as needed   Refills:  0       EPINEPHrine 0.3 MG/0.3ML injection   Used for:  Bee allergy status        Dose:  0.3 mg   Inject 0.3 mLs (0.3 mg)  into the muscle once as needed for anaphylaxis   Quantity:  1 each   Refills:  PRN       escitalopram 20 MG tablet   Commonly known as:  LEXAPRO   Used for:  Major depressive disorder, recurrent episode, mild (H)        Dose:  20 mg   Take 1 tablet (20 mg) by mouth daily   Quantity:  30 tablet   Refills:  PRN       fluticasone 50 MCG/ACT spray   Commonly known as:  FLONASE   Used for:  Chronic maxillary sinusitis        Dose:  2 spray   Spray 2 sprays into both nostrils daily   Quantity:  48 g   Refills:  1       lisinopril 10 MG tablet   Commonly known as:  PRINIVIL/ZESTRIL   Used for:  Essential hypertension        Dose:  10 mg   Take 1 tablet (10 mg) by mouth daily   Quantity:  90 tablet   Refills:  PRN       metFORMIN 500 MG 24 hr tablet   Commonly known as:  GLUCOPHAGE-XR   Used for:  Type 2 diabetes mellitus without complication, without long-term current use of insulin (H)        Take two tabs by mouth once daily with evening meal.   Quantity:  180 tablet   Refills:  PRN       MULTIVITAMIN ADULT PO        Dose:  1 tablet   Take 1 tablet by mouth daily   Refills:  0       order for DME   Used for:  Essential hypertension        Equipment being ordered: blood pressure monitor   Quantity:  1 Units   Refills:  0       order for DME   Used for:  Long-term (current) use of anticoagulants, Acute deep vein thrombosis (DVT) of left lower extremity, unspecified vein (H)        Compression stockings 20-30 mm Hg. To be wear when directed by Hematology team and while awake for the first two years post clot.   Quantity:  1 each   Refills:  0       PRO-BIOTIC BLEND PO        Dose:  1 capsule   Take 1 capsule by mouth daily Enzymatic Therapy-probiotic pearls   Refills:  0       rivaroxaban ANTICOAGULANT 20 MG Tabs tablet   Commonly known as:  XARELTO   Used for:  Long-term (current) use of anticoagulants, Acute deep vein thrombosis (DVT) of left lower extremity, unspecified vein (H)        Dose:  20 mg   Take 1 tablet (20  mg) by mouth daily (with dinner)   Quantity:  30 tablet   Refills:  3       vitamin B complex with vitamin C Tabs tablet        Dose:  1 tablet   Take 1 tablet by mouth daily   Refills:  0       vitamin D 1000 UNITS capsule        Dose:  2000 Units   Take 2,000 Units by mouth daily   Refills:  0       VYVANSE PO        Dose:  50 mg   Take 50 mg by mouth daily   Refills:  0         STOP taking     traMADol 50 MG tablet   Commonly known as:  ULTRAM                Where to get your medicines      Some of these will need a paper prescription and others can be bought over the counter. Ask your nurse if you have questions.     Bring a paper prescription for each of these medications     acetaminophen 325 MG tablet    HYDROmorphone 4 MG tablet    polyethylene glycol Packet    senna-docusate 8.6-50 MG per tablet                Protect others around you: Learn how to safely use, store and throw away your medicines at www.disposemymeds.org.             Medication List: This is a list of all your medications and when to take them. Check marks below indicate your daily home schedule. Keep this list as a reference.      Medications           Morning Afternoon Evening Bedtime As Needed    acetaminophen 325 MG tablet   Commonly known as:  TYLENOL   Take 2 tablets (650 mg) by mouth every 6 hours as needed for mild pain   Last time this was given:  650 mg on 7/12/2017  9:15 AM                                albuterol 108 (90 BASE) MCG/ACT Inhaler   Commonly known as:  albuterol   Inhale 2 puffs into the lungs 4 times daily as needed for shortness of breath / dyspnea   Last time this was given:  6 puffs on 7/7/2017  5:44 PM                                atorvastatin 80 MG tablet   Commonly known as:  LIPITOR   Take 1 tablet (80 mg) by mouth daily   Last time this was given:  80 mg on 7/11/2017  7:36 PM                                CALCIUM-MAGNESIUM-VITAMIN D PO   Take 2 tablets by mouth daily                                 DIGESTIVE ENZYME PO   Take 1 capsule by mouth as needed                                EPINEPHrine 0.3 MG/0.3ML injection   Inject 0.3 mLs (0.3 mg) into the muscle once as needed for anaphylaxis                                escitalopram 20 MG tablet   Commonly known as:  LEXAPRO   Take 1 tablet (20 mg) by mouth daily   Last time this was given:  20 mg on 7/12/2017 10:22 AM                                fluticasone 50 MCG/ACT spray   Commonly known as:  FLONASE   Spray 2 sprays into both nostrils daily   Last time this was given:  2 sprays on 7/12/2017  9:10 AM                                HYDROmorphone 4 MG tablet   Commonly known as:  DILAUDID   Take 1-1.5 tablets (4-6 mg) by mouth every 3 hours as needed for moderate to severe pain   Last time this was given:  4 mg on 7/12/2017  9:15 AM                                lisinopril 10 MG tablet   Commonly known as:  PRINIVIL/ZESTRIL   Take 1 tablet (10 mg) by mouth daily   Last time this was given:  10 mg on 7/6/2017  7:01 PM                                metFORMIN 500 MG 24 hr tablet   Commonly known as:  GLUCOPHAGE-XR   Take two tabs by mouth once daily with evening meal.                                MULTIVITAMIN ADULT PO   Take 1 tablet by mouth daily   Last time this was given:  1 tablet on 7/12/2017 10:25 AM                                order for DME   Equipment being ordered: blood pressure monitor                                order for DME   Compression stockings 20-30 mm Hg. To be wear when directed by Hematology team and while awake for the first two years post clot.                                polyethylene glycol Packet   Commonly known as:  MIRALAX/GLYCOLAX   Take 17 g by mouth daily   Last time this was given:  17 g on 7/11/2017  7:36 PM                                PRO-BIOTIC BLEND PO   Take 1 capsule by mouth daily Enzymatic Therapy-probiotic pearls                                rivaroxaban ANTICOAGULANT 20 MG Tabs tablet   Commonly  known as:  XARELTO   Take 1 tablet (20 mg) by mouth daily (with dinner)   Last time this was given:  20 mg on 7/11/2017  7:27 PM                                senna-docusate 8.6-50 MG per tablet   Commonly known as:  SENOKOT-S;PERICOLACE   Take 2 tablets by mouth 2 times daily Start with 1 tablet PO BID, If no bowel movement in 24 hours, increase to 2 tablets PO BID.  Hold for loose stools.   Last time this was given:  1 tablet on 7/12/2017 10:23 AM                                vitamin B complex with vitamin C Tabs tablet   Take 1 tablet by mouth daily   Last time this was given:  1 tablet on 7/12/2017 10:25 AM                                vitamin D 1000 UNITS capsule   Take 2,000 Units by mouth daily                                VYVANSE PO   Take 50 mg by mouth daily

## 2017-07-02 NOTE — LETTER
Health Information Management Services               Recipient:  [facility]          Sender:  Allison Thorne LG   Pager: [phone]          Date: July 12, 2017  Patient Name:  Hafsa Corona  Routing Message:  Discharge orders. Family/friend transport around 1pm today. Thanks!  [facility] RN: [phone]          The documents accompanying this notice contain confidential information belonging to the sender.  This information is intended only for the use of the individual or entity named above.  The authorized recipient of this information is prohibited from disclosing this information to any other party and is required to destroy the information after its stated need has been fulfilled, unless otherwise required by state law.      If you are not the intended recipient, you are hereby notified that any disclosure, copying, distribution or action taken in reliance on the contents of these documents is strictly prohibited. If you have received this document in error, please notify [facility] immediately at [phone].  You may return the document via fax ([phone]) or return mail  (Health Information Management, , [address] [place] [address]).

## 2017-07-02 NOTE — DISCHARGE INSTRUCTIONS
Discharge Instructions for Cellulitis  You have been diagnosed with cellulitis. This is an infection in the deepest layer of the skin. In some cases, the infection also affects the muscle. Cellulitis is caused by bacteria. The bacteria can enter the body through broken skin. This can happen with a cut, scratch, animal bite, or an insect bite that has been scratched. You may have been treated in the hospital with antibiotics and fluids. You will likely be given a prescription for antibiotics to take at home. This sheet will help you take care of yourself at home.  Home care  When you are home:    Take the prescribed antibiotic medicine you are given as directed until it is gone. Take it even if you feel better. It treats the infection and stops it from returning. Not taking all the medicine can make future infections hard to treat.    Keep the infected area clean.    When possible, raise the infected area above the level of your heart. This helps keep swelling down.    Talk with your healthcare provider if you are in pain. Ask what kind of over-the-counter medicine you can take for pain.    Apply clean bandages as advised.    Take your temperature once a day for a week.    Wash your hands often to prevent spreading the infection.  In the future, wash your hands before and after you touch cuts, scratches, or bandages. This will help prevent infection.   When to call your healthcare provider  Call your healthcare provider immediately if you have any of the following:    Difficulty or pain when moving the joints above or below the infected area    Discharge or pus draining from the area    Fever of 100.4 F (38 C) or higher, or as directed by your healthcare provider    Pain that gets worse in or around the infected     Redness that gets worse in or around the infected area, particularly if the area of redness expands to a wider area    Shaking chills    Swelling of the infected area    Vomiting   Date Last Reviewed:  8/1/2016 2000-2017 [employer]. [address] [place] [address], [place] [address]. All rights reserved. This information is not intended as a substitute for professional medical care. Always follow your healthcare professional's instructions.      Please make an appointment to follow up with [facility] or Your Primary Gynecology Provider as soon as possible or further evaluation and care.  Information for the [facility] is below.         Appointments and Locations   [facility], [facility]   [address]  ?[address] [place] [address]   Appointments: [phone]   Provider Referrals: [phone]   Information: [phone] Get Directions   [place]  Gynec[facility]   Lower Level  ?[address]  [place] [address]   Appointments: [phone]  [place]  Cancer Clinic ([facility]) Clinics and Surgery Center   Floor 2  [address] [place] [address]   Appointments: [phone]

## 2017-07-02 NOTE — IP AVS SNAPSHOT
MRN:[id number]                      After Visit Summary   7/2/2017    Hafsa Corona    MRN: [id number]           Thank you!     Thank you for choosing [facility] for your care. Our goal is always to provide you with excellent care. Hearing back from our patients is one way we can continue to improve our services. Please take a few minutes to complete the written survey that you may receive in the mail after you visit with us. Thank you!        Patient Information     Date Of Birth          1955        Designated Caregiver       Most Recent Value    Caregiver    Will someone help with your care after discharge? no      About your hospital stay     You were admitted on:  July 3, 2017 You last received care in the:  [address] [facility]    You were discharged on:  July 12, 2017        Reason for your hospital stay       Surgery                  Who to Call     For medical emergencies, please call 911.  For non-urgent questions about your medical care, please call your primary care provider or clinic, [phone]  For questions related to your surgery, please call your surgery clinic        Attending Provider     Provider Specialty    Thelma Lopez MD Emergency Medicine    Kristal Mondragon MD [facility]    Darwin Antonio MD [facility]    Serena Cole MD OB/Gyn       Primary Care Provider Office Phone # Fax #    Morelia Ayon [phone]      After Care Instructions     Activity - Ambulate in hallway       Every shift            Activity - Up with nursing assistance           Additional Discharge Instructions       Elevate LE            Advance Diet as Tolerated       Follow this diet upon discharge: Regular diabetic diet            Encourage PO fluids           Fall precautions           General info for SNF       Length of Stay Estimate: Short Term Care: Estimated # of Days <30  Condition at Discharge: Stable  Level of care:skilled  "  Rehabilitation Potential: Good  Admission H&P remains valid and up-to-date: Yes  Recent Chemotherapy: N/A  Use Nursing Home Standing Orders: No            Glucose monitor nursing POCT       Before meals and at bedtime            Intake and output       Every shift            Mantoux instructions       Give two-step Mantoux (PPD) Per Facility Policy Yes            Oxygen - Nasal cannula       1-2 Lpm by nasal cannula to keep O2 sats 92% or greater.            Wound care (specify)       Site:   Abd midline inc  Instructions:  Keep c/d/i. Abd binder to be worn when oob. Monitor for signs of infection. Ok to shower and pat dry. Keep Open to air    Left thigh wound care instructions: Change dressing daily, clean with Microklenz, pack wound with 1/4\" Iodoform gauze, cover with Mepilex dressing                  Follow-up Appointments     Follow Up and recommended labs and tests       Follow up 7/31/17 Dr Shaffer - call prior to then for questions or concerns [phone].    Left thigh wound care instructions: Change dressing daily, clean with Microklenz, pack wound with 1/4\" Iodoform gauze, cover with Mepilex dressing    Left leg DVT: Continue taking Xarelto as prescribed by your hematologist. Follow up with your hematologist as previously scheduled for further management of your DVT.     GENERAL POST-OPERATIVE  PATIENT INSTRUCTIONS      FOLLOW-UP:    Call Surgeon if you have:  Temperature greater than 100.4  Persistent nausea and vomiting  Severe uncontrolled pain  Redness, tenderness, or signs of infection (pain, swelling, redness, odor or green/yellow discharge around the site)  Difficulty breathing, headache or visual disturbances  Hives  Persistent dizziness or light-headedness  Extreme fatigue  Any other questions or concerns you may have after discharge    In an emergency, call 911 or go to an Emergency Department at a nearby hospital       WOUND CARE INSTRUCTIONS:  Keep a dry clean dressing on the wound if " there is drainage. If you had a bandage initially, it may be removed after 24 hours.  Once the wound has quit draining you may leave it open to air.  If clothing rubs against the wound or causes irritation and the wound is not draining you may cover it with a dry dressing during the daytime.  Try to keep the wound dry and avoid ointments on the wound unless directed to do so.  If the wound becomes bright red and painful or starts to drain infected material that is not clear, please contact your physician immediately.    1.  You may shower 24 hrs after surgery   2.  No soaking in the tub for 4 weeks       DIET:  There are no dietary restrictions.  You may eat any foods that you can tolerate unless instructed otherwise.  It is a good idea to eat a high fiber diet and take in plenty of fluids to prevent constipation.  If you become constipated, please follow the instructions below.    ACTIVITY:  You are encouraged to cough, deep breath and use your incentive spirometer if you were given one, every 15-30 minutes when awake.  This will help prevent respiratory complications and low grade fevers post-operatively.  You may want to hug a pillow when coughing and sneezing to add additional support to the surgical area, if you had abdominal surgery, which will decrease pain during these times.      1.  No heavy lifting >20lbs or strenuous exercise for six-eight weeks.  No exercise in which you are using core muscles (yoga, pilates, swimming, weight lifting)  2.  You may walk as much as you wish.  You are encouraged to increase your activity each day after surgery.  Stairs are okay.   3.  Nothing per vagina for eight weeks.  No tampons, no intercourse, no douching.  You can expect some light vaginal spotting and discharge for up to six weeks.  If bleeding becomes heavy, please contact the office.     MEDICATIONS:  Try to take narcotic medications and anti-inflammatory medications, such as tylenol, ibuprofen, naprosyn, etc.,  with food.  This will minimize stomach upset from the medication.  Should you develop nausea and vomiting from the pain medication, or develop a rash, please discontinue the medication and contact your physician.  You should not drive, make important decisions, or operate machinery when taking narcotic pain medication.    OTHER:  Patients are often constipated after general anesthesia and surgery.  The patient should continue to take stool softeners (for example, Senokot-S) for the next six weeks (unless diarrhea develops) and consume adequate amounts of water.  If the patient remains constipated or unable to pass stool, please try one or all of the following measures:  1.  Milk of Magnesia 30cc twice a day as needed by mouth  2.  Metamucil 2 tablespoons in 12 ounces of fluid  3.  Dulcolax oral or suppositories  4.  Prunes or prune juice  5.  Miralax daily      QUESTIONS:  Please feel free to call your physician or the hospital  if you have any questions, and they will be glad to assist you.                  Your next 10 appointments already scheduled     Jul 31, 2017  3:00 PM CDT   (Arrive by 2:45 PM)   Return Visit with Jarod Shaffer MD   [facility] ([facility])    [address] [place] [id number] [phone]            Sep 15, 2017 12:15 PM CDT   LAB with  LAB   [facility])    [address] [place] [id number]   [phone]           Patient must bring picture ID.  Patient should be prepared to give a urine specimen  Please do not eat 10-12 hours before your appointment if you are coming in fasting for labs on lipids, cholesterol, or glucose (sugar).  Pregnant women should follow their Care Team instructions. Water with medications is okay. Do not drink coffee or other fluids.   If you have concerns about taking  your medications, please ask at office or if  scheduling via [employer], send a message by clicking on Secure Messaging, Message Your Care Team.            Sep 15, 2017 12:30 PM CDT   US LOWER EXTREMITY VENOUS DUPLEX LEFT with [id number]   [facility] ([facility])    [address] [place], [place] [id number] [phone]           Please bring a list of your medicines (including vitamins, minerals and over-the-counter drugs). Also, tell your doctor about any allergies you may have. Wear comfortable clothes and leave your valuables at home.  You do not need to do anything special to prepare for your exam.  Please call the Imaging Department at your exam site with any questions.            Sep 15, 2017  1:30 PM CDT   (Arrive by 1:15 PM)   RETURN CLOTTING DISORDER with William Villarreal MD   [facility] ([facility])    [address] [place] [id number] [phone]              Additional Services     Occupational Therapy Adult Consult       Evaluate and treat as clinically indicated.    Reason:  Weakness post op            Physical Therapy Adult Consult       Evaluate and treat as clinically indicated.    Reason:  Weakness post op                  Future tests that were ordered for you     ABO/Rh Type and Screen                 Additional Information     If you use hormonal birth control (such as the pill, patch, ring or implants): You'll need a second form of birth control for 7 days (condoms, a diaphragm or contraceptive foam). While in the hospital, you received a medicine called Bridion. Your normal birth control will not work as well for a week after taking this medicine.          Pending Results     Date and Time Order Name Status Description    7/7/2017 1436 Surgical pathology exam Preliminary             Statement of Approval     Ordered          07/12/17 1124  I have reviewed and agree with all the recommendations and orders  "detailed in this document.  EFFECTIVE NOW     Approved and electronically signed by:  Corry Segura MD             Admission Information     Date & Time Provider Department Dept. Phone    2017 Serena Cole MD [address] [facility] [phone]      Your Vitals Were     Blood Pressure Pulse Temperature Respirations Height Weight    116/68 (BP Location: Right arm) 96 98.1  F (36.7  C) (Oral) 17 1.613 m (5' 3.5\") 101.5 kg (223 lb 11.2 oz)    Pulse Oximetry BMI (Body Mass Index)                93% 39.01 kg/m2          MyChart Information     [employer] lets you send messages to your doctor, view your test results, renew your prescriptions, schedule appointments and more. To sign up, go to [email or web address] . Click on \"Log in\" on the left side of the screen, which will take you to the Welcome page. Then click on \"Sign up Now\" on the right side of the page.     You will be asked to enter the access code listed below, as well as some personal information. Please follow the directions to create your username and password.     Your access code is: [email or web address]  Expires: 10/10/2017 11:16 AM     Your access code will  in 90 days. If you need help or a new code, please call your [place] clinic or [phone].        Care EveryWhere ID     This is your Care EveryWhere ID. This could be used by other organizations to access your [place] medical records  [id number]        Equal Access to Services     [facility] Hospital[facility] : Hadandres tellez Soaxel, waaxda luqadaha, qaybta kaalmada sujatha palm. So [facility] [phone].    ATENCIÓN: Si habla español, tiene a martínez disposición servicios gratuitos de asistencia lingüística. Llame al [phone].    We comply with applicable federal civil rights and [place] laws. We do not discriminate on the basis of race, color, national origin, age, disability, sex, sexual orientation or gender identity.                           "   Review of your medicines      START taking        Dose / Directions    acetaminophen 325 MG tablet   Commonly known as:  TYLENOL   Used for:  Cellulitis and abscess of leg, Mass of pelvis        Dose:  650 mg   Take 2 tablets (650 mg) by mouth every 6 hours as needed for mild pain   Quantity:  100 tablet   Refills:  0       HYDROmorphone 4 MG tablet   Commonly known as:  DILAUDID   Used for:  Mass of pelvis, Cellulitis and abscess of leg        Dose:  4-6 mg   Take 1-1.5 tablets (4-6 mg) by mouth every 3 hours as needed for moderate to severe pain   Quantity:  50 tablet   Refills:  0       polyethylene glycol Packet   Commonly known as:  MIRALAX/GLYCOLAX   Used for:  Mass of pelvis        Dose:  17 g   Take 17 g by mouth daily   Quantity:  7 packet   Refills:  0       senna-docusate 8.6-50 MG per tablet   Commonly known as:  SENOKOT-S;PERICOLACE   Used for:  Mass of pelvis        Dose:  2 tablet   Take 2 tablets by mouth 2 times daily Start with 1 tablet PO BID, If no bowel movement in 24 hours, increase to 2 tablets PO BID.  Hold for loose stools.   Quantity:  100 tablet   Refills:  0         CONTINUE these medicines which have NOT CHANGED        Dose / Directions    albuterol 108 (90 BASE) MCG/ACT Inhaler   Commonly known as:  albuterol   Used for:  Cough        Dose:  2 puff   Inhale 2 puffs into the lungs 4 times daily as needed for shortness of breath / dyspnea   Quantity:  1 Inhaler   Refills:  PRN       atorvastatin 80 MG tablet   Commonly known as:  LIPITOR   Used for:  Hyperlipidemia LDL goal <100        Dose:  80 mg   Take 1 tablet (80 mg) by mouth daily   Quantity:  90 tablet   Refills:  PRN       CALCIUM-MAGNESIUM-VITAMIN D PO        Dose:  2 tablet   Take 2 tablets by mouth daily   Refills:  0       DIGESTIVE ENZYME PO        Dose:  1 capsule   Take 1 capsule by mouth as needed   Refills:  0       EPINEPHrine 0.3 MG/0.3ML injection   Used for:  Bee allergy status        Dose:  0.3 mg   Inject 0.3 mLs  (0.3 mg) into the muscle once as needed for anaphylaxis   Quantity:  1 each   Refills:  PRN       escitalopram 20 MG tablet   Commonly known as:  LEXAPRO   Used for:  Major depressive disorder, recurrent episode, mild (H)        Dose:  20 mg   Take 1 tablet (20 mg) by mouth daily   Quantity:  30 tablet   Refills:  PRN       fluticasone 50 MCG/ACT spray   Commonly known as:  FLONASE   Used for:  Chronic maxillary sinusitis        Dose:  2 spray   Spray 2 sprays into both nostrils daily   Quantity:  48 g   Refills:  1       lisinopril 10 MG tablet   Commonly known as:  PRINIVIL/ZESTRIL   Used for:  Essential hypertension        Dose:  10 mg   Take 1 tablet (10 mg) by mouth daily   Quantity:  90 tablet   Refills:  PRN       metFORMIN 500 MG 24 hr tablet   Commonly known as:  GLUCOPHAGE-XR   Used for:  Type 2 diabetes mellitus without complication, without long-term current use of insulin (H)        Take two tabs by mouth once daily with evening meal.   Quantity:  180 tablet   Refills:  PRN       MULTIVITAMIN ADULT PO        Dose:  1 tablet   Take 1 tablet by mouth daily   Refills:  0       order for DME   Used for:  Essential hypertension        Equipment being ordered: blood pressure monitor   Quantity:  1 Units   Refills:  0       order for DME   Used for:  Long-term (current) use of anticoagulants, Acute deep vein thrombosis (DVT) of left lower extremity, unspecified vein (H)        Compression stockings 20-30 mm Hg. To be wear when directed by Hematology team and while awake for the first two years post clot.   Quantity:  1 each   Refills:  0       PRO-BIOTIC BLEND PO        Dose:  1 capsule   Take 1 capsule by mouth daily Enzymatic Therapy-probiotic pearls   Refills:  0       rivaroxaban ANTICOAGULANT 20 MG Tabs tablet   Commonly known as:  XARELTO   Used for:  Long-term (current) use of anticoagulants, Acute deep vein thrombosis (DVT) of left lower extremity, unspecified vein (H)        Dose:  20 mg   Take 1  tablet (20 mg) by mouth daily (with dinner)   Quantity:  30 tablet   Refills:  3       vitamin B complex with vitamin C Tabs tablet        Dose:  1 tablet   Take 1 tablet by mouth daily   Refills:  0       vitamin D 1000 UNITS capsule        Dose:  2000 Units   Take 2,000 Units by mouth daily   Refills:  0       VYVANSE PO        Dose:  50 mg   Take 50 mg by mouth daily   Refills:  0         STOP taking     traMADol 50 MG tablet   Commonly known as:  ULTRAM                Where to get your medicines      Some of these will need a paper prescription and others can be bought over the counter. Ask your nurse if you have questions.     Bring a paper prescription for each of these medications     acetaminophen 325 MG tablet    HYDROmorphone 4 MG tablet    polyethylene glycol Packet    senna-docusate 8.6-50 MG per tablet                Protect others around you: Learn how to safely use, store and throw away your medicines at www.disposemymeds.org.             Medication List: This is a list of all your medications and when to take them. Check marks below indicate your daily home schedule. Keep this list as a reference.      Medications           Morning Afternoon Evening Bedtime As Needed    acetaminophen 325 MG tablet   Commonly known as:  TYLENOL   Take 2 tablets (650 mg) by mouth every 6 hours as needed for mild pain   Last time this was given:  650 mg on 7/12/2017  9:15 AM                                albuterol 108 (90 BASE) MCG/ACT Inhaler   Commonly known as:  albuterol   Inhale 2 puffs into the lungs 4 times daily as needed for shortness of breath / dyspnea   Last time this was given:  6 puffs on 7/7/2017  5:44 PM                                atorvastatin 80 MG tablet   Commonly known as:  LIPITOR   Take 1 tablet (80 mg) by mouth daily   Last time this was given:  80 mg on 7/11/2017  7:36 PM                                CALCIUM-MAGNESIUM-VITAMIN D PO   Take 2 tablets by mouth daily                                 DIGESTIVE ENZYME PO   Take 1 capsule by mouth as needed                                EPINEPHrine 0.3 MG/0.3ML injection   Inject 0.3 mLs (0.3 mg) into the muscle once as needed for anaphylaxis                                escitalopram 20 MG tablet   Commonly known as:  LEXAPRO   Take 1 tablet (20 mg) by mouth daily   Last time this was given:  20 mg on 7/12/2017 10:22 AM                                fluticasone 50 MCG/ACT spray   Commonly known as:  FLONASE   Spray 2 sprays into both nostrils daily   Last time this was given:  2 sprays on 7/12/2017  9:10 AM                                HYDROmorphone 4 MG tablet   Commonly known as:  DILAUDID   Take 1-1.5 tablets (4-6 mg) by mouth every 3 hours as needed for moderate to severe pain   Last time this was given:  4 mg on 7/12/2017  9:15 AM                                lisinopril 10 MG tablet   Commonly known as:  PRINIVIL/ZESTRIL   Take 1 tablet (10 mg) by mouth daily   Last time this was given:  10 mg on 7/6/2017  7:01 PM                                metFORMIN 500 MG 24 hr tablet   Commonly known as:  GLUCOPHAGE-XR   Take two tabs by mouth once daily with evening meal.                                MULTIVITAMIN ADULT PO   Take 1 tablet by mouth daily   Last time this was given:  1 tablet on 7/12/2017 10:25 AM                                order for DME   Equipment being ordered: blood pressure monitor                                order for DME   Compression stockings 20-30 mm Hg. To be wear when directed by Hematology team and while awake for the first two years post clot.                                polyethylene glycol Packet   Commonly known as:  MIRALAX/GLYCOLAX   Take 17 g by mouth daily   Last time this was given:  17 g on 7/11/2017  7:36 PM                                PRO-BIOTIC BLEND PO   Take 1 capsule by mouth daily Enzymatic Therapy-probiotic pearls                                rivaroxaban ANTICOAGULANT 20 MG Tabs tablet   Commonly  known as:  XARELTO   Take 1 tablet (20 mg) by mouth daily (with dinner)   Last time this was given:  20 mg on 7/11/2017  7:27 PM                                senna-docusate 8.6-50 MG per tablet   Commonly known as:  SENOKOT-S;PERICOLACE   Take 2 tablets by mouth 2 times daily Start with 1 tablet PO BID, If no bowel movement in 24 hours, increase to 2 tablets PO BID.  Hold for loose stools.   Last time this was given:  1 tablet on 7/12/2017 10:23 AM                                vitamin B complex with vitamin C Tabs tablet   Take 1 tablet by mouth daily   Last time this was given:  1 tablet on 7/12/2017 10:25 AM                                vitamin D 1000 UNITS capsule   Take 2,000 Units by mouth daily                                VYVANSE PO   Take 50 mg by mouth daily

## 2017-07-02 NOTE — IP AVS SNAPSHOT
` ` Patient Information     Patient Name Sex Hafsa Leon ([id number]) Female 1955       Room Bed    7403 7403-01      Patient Demographics     Address Phone    [address]  [place] [address] [phone] (Home)  [phone] (Mobile)      Patient Ethnicity & Race     Ethnic Group Patient Race    American White      Emergency Contact(s)     Name Relation Home Work Mobile    FORTINO MORROW Friend [phone]    Sue Sister [phone]      Christine Sister [phone]        Documents on File        Status Date Received Description       Documents for the Patient    Insurance Card  () 05     Face Sheet Received () 09     Privacy Notice - [place] Received 05     Insurance Card  () 06     Insurance Card  () 08     External Medication Information Consent Accepted () 09     Face Sheet Received () 09     Insurance Card  () 06/27/10     Patient ID Received () 09/15/14 [place]    Consent for Services - Hospital/Clinic Received () 11     Privacy Notice - [place] Received 11     External Medication Information Consent Accepted () 11     Consent for Services - Hospital/Clinic Received () 11     Insurance Card Received () 11     Insurance Card Received () 11     Insurance Card Received () 12 HP    Consent for Services - Hospital/Clinic Received () 12     External Medication Information Consent Accepted 01/10/13     Consent for EHR Access  13 Copied from existing Consent for services - C/HOD collected on 2012    HIM LUIS MANUEL Authorization - File Only   [facility] 13    [facility] Specified Other       Insurance Card Received () 10/24/13 hp    Insurance Card  ()      Consent for Services - Hospital/Clinic Received () 13     External Medication Information  Consent Accepted 13     Insurance Card Received () 09/15/14 [facility] [place]    Consent for Services - Hospital/Clinic Received () 14     Insurance Card Received () 04/20/15 [employer]    Consent for Services/Privacy Notice - Hospital/Clinic Received () 16     Insurance Card Received () 16     Insurance Card Received 17     Consent for Services/Privacy Notice - Hospital/Clinic Received 17     Patient ID Received 17     Advance Directives and Living Will Received 17 POLST 17       Documents for the Encounter    CMS IM for Patient Signature       EMS/Ambulance Record  17 [facility] MEDICAL TRANSPORTATION      Admission Information     Attending Provider Admitting Provider Admission Type Admission Date/Time    Serena Cole MD Teoh, Deanna Gek Koon, MD Emergency 17  3078    Discharge Date Hospital Service Auth/Cert Status Service Area     Gyn[facility]c [facility]    Unit Room/Bed Admission Status       U [facility] 7403/7403-01 Admission (Confirmed)       Admission     Complaint    Pelvic mass in female, Pelvic Mass , S/P laparotomy      Hospital Account     Name Acct ID Class Status Primary Coverage    Hafsa Corona [id number] Inpatient [facility] [place] [facility] [place] OPEN ACCESS FULLY INSURED            Guarantor Account (for Hospital Account #[id number])     Name Relation to Pt Service Area Active? Acct Type    Hafsa Corona  FCS Yes Personal/Family    Address Phone          [address]  [place] [address] [phone](H)              Coverage Information (for Hospital Account #[id number])     F/O Payor/Plan Precert #    [employer] OPEN ACCESS FULLY INSURED     Subscriber Subscriber #    Hafsa Corona [id number]    Address Phone    [address]  [place] [address] [phone]

## 2017-07-02 NOTE — IP AVS SNAPSHOT
[address] [address]    Phone:  [phone]                                       After Visit Summary   7/2/2017    Hafsa Corona    MRN: [id number]           After Visit Summary Signature Page     I have received my discharge instructions, and my questions have been answered. I have discussed any challenges I see with this plan with the nurse or doctor.    ..........................................................................................................................................  Patient/Patient Representative Signature      ..........................................................................................................................................  Patient Representative Print Name and Relationship to Patient    ..................................................               ................................................  Date                                            Time    ..........................................................................................................................................  Reviewed by Signature/Title    ...................................................              ..............................................  Date                                                            Time

## 2017-07-03 ENCOUNTER — APPOINTMENT (OUTPATIENT)
Dept: GENERAL RADIOLOGY | Facility: CLINIC | Age: 62
DRG: 737 | End: 2017-07-03
Payer: COMMERCIAL

## 2017-07-03 ENCOUNTER — TELEPHONE (OUTPATIENT)
Dept: GENERAL RADIOLOGY | Facility: CLINIC | Age: 62
End: 2017-07-03

## 2017-07-03 PROBLEM — R19.00 PELVIC MASS IN FEMALE: Status: ACTIVE | Noted: 2017-07-03

## 2017-07-03 LAB
ALBUMIN UR-MCNC: 30 MG/DL
AMORPH CRY #/AREA URNS HPF: ABNORMAL /HPF
ANION GAP SERPL CALCULATED.3IONS-SCNC: 8 MMOL/L (ref 3–14)
APPEARANCE UR: ABNORMAL
BACTERIA #/AREA URNS HPF: ABNORMAL /HPF
BASOPHILS # BLD AUTO: 0 10E9/L (ref 0–0.2)
BASOPHILS # BLD AUTO: 0 10E9/L (ref 0–0.2)
BASOPHILS NFR BLD AUTO: 0.2 %
BASOPHILS NFR BLD AUTO: 0.2 %
BILIRUB UR QL STRIP: NEGATIVE
BUN SERPL-MCNC: 17 MG/DL (ref 7–30)
CALCIUM SERPL-MCNC: 8.5 MG/DL (ref 8.5–10.1)
CANCER AG125 SERPL-ACNC: 1187 U/ML (ref 0–30)
CHLORIDE SERPL-SCNC: 106 MMOL/L (ref 94–109)
CO2 SERPL-SCNC: 26 MMOL/L (ref 20–32)
COLOR UR AUTO: YELLOW
CREAT SERPL-MCNC: 0.88 MG/DL (ref 0.52–1.04)
DIFFERENTIAL METHOD BLD: ABNORMAL
DIFFERENTIAL METHOD BLD: ABNORMAL
EOSINOPHIL # BLD AUTO: 0.1 10E9/L (ref 0–0.7)
EOSINOPHIL # BLD AUTO: 0.1 10E9/L (ref 0–0.7)
EOSINOPHIL NFR BLD AUTO: 0.6 %
EOSINOPHIL NFR BLD AUTO: 0.6 %
ERYTHROCYTE [DISTWIDTH] IN BLOOD BY AUTOMATED COUNT: 14.6 % (ref 10–15)
ERYTHROCYTE [DISTWIDTH] IN BLOOD BY AUTOMATED COUNT: 14.6 % (ref 10–15)
GFR SERPL CREATININE-BSD FRML MDRD: 65 ML/MIN/1.7M2
GLUCOSE BLDC GLUCOMTR-MCNC: 107 MG/DL (ref 70–99)
GLUCOSE BLDC GLUCOMTR-MCNC: 115 MG/DL (ref 70–99)
GLUCOSE BLDC GLUCOMTR-MCNC: 116 MG/DL (ref 70–99)
GLUCOSE BLDC GLUCOMTR-MCNC: 136 MG/DL (ref 70–99)
GLUCOSE BLDC GLUCOMTR-MCNC: 158 MG/DL (ref 70–99)
GLUCOSE SERPL-MCNC: 112 MG/DL (ref 70–99)
GLUCOSE UR STRIP-MCNC: NEGATIVE MG/DL
HCT VFR BLD AUTO: 26.2 % (ref 35–47)
HCT VFR BLD AUTO: 27 % (ref 35–47)
HGB BLD-MCNC: 8.2 G/DL (ref 11.7–15.7)
HGB BLD-MCNC: 8.4 G/DL (ref 11.7–15.7)
HGB UR QL STRIP: ABNORMAL
IMM GRANULOCYTES # BLD: 0 10E9/L (ref 0–0.4)
IMM GRANULOCYTES # BLD: 0 10E9/L (ref 0–0.4)
IMM GRANULOCYTES NFR BLD: 0.2 %
IMM GRANULOCYTES NFR BLD: 0.2 %
IRON SATN MFR SERPL: 6 % (ref 15–46)
IRON SERPL-MCNC: 18 UG/DL (ref 35–180)
KETONES UR STRIP-MCNC: 40 MG/DL
LDH SERPL L TO P-CCNC: 332 U/L (ref 81–234)
LEUKOCYTE ESTERASE UR QL STRIP: NEGATIVE
LYMPHOCYTES # BLD AUTO: 1.2 10E9/L (ref 0.8–5.3)
LYMPHOCYTES # BLD AUTO: 1.5 10E9/L (ref 0.8–5.3)
LYMPHOCYTES NFR BLD AUTO: 13 %
LYMPHOCYTES NFR BLD AUTO: 16.7 %
MCH RBC QN AUTO: 26.4 PG (ref 26.5–33)
MCH RBC QN AUTO: 26.7 PG (ref 26.5–33)
MCHC RBC AUTO-ENTMCNC: 31.1 G/DL (ref 31.5–36.5)
MCHC RBC AUTO-ENTMCNC: 31.3 G/DL (ref 31.5–36.5)
MCV RBC AUTO: 85 FL (ref 78–100)
MCV RBC AUTO: 85 FL (ref 78–100)
MONOCYTES # BLD AUTO: 0.8 10E9/L (ref 0–1.3)
MONOCYTES # BLD AUTO: 0.9 10E9/L (ref 0–1.3)
MONOCYTES NFR BLD AUTO: 10.8 %
MONOCYTES NFR BLD AUTO: 9.2 %
MRSA DNA SPEC QL NAA+PROBE: NORMAL
MUCOUS THREADS #/AREA URNS LPF: PRESENT /LPF
NEUTROPHILS # BLD AUTO: 6.2 10E9/L (ref 1.6–8.3)
NEUTROPHILS # BLD AUTO: 6.9 10E9/L (ref 1.6–8.3)
NEUTROPHILS NFR BLD AUTO: 71.5 %
NEUTROPHILS NFR BLD AUTO: 76.8 %
NITRATE UR QL: NEGATIVE
NRBC # BLD AUTO: 0 10*3/UL
NRBC # BLD AUTO: 0 10*3/UL
NRBC BLD AUTO-RTO: 0 /100
NRBC BLD AUTO-RTO: 0 /100
PH UR STRIP: 5 PH (ref 5–7)
PLATELET # BLD AUTO: 296 10E9/L (ref 150–450)
PLATELET # BLD AUTO: 303 10E9/L (ref 150–450)
POTASSIUM SERPL-SCNC: 3.9 MMOL/L (ref 3.4–5.3)
RBC # BLD AUTO: 3.07 10E12/L (ref 3.8–5.2)
RBC # BLD AUTO: 3.18 10E12/L (ref 3.8–5.2)
RBC #/AREA URNS AUTO: 5 /HPF (ref 0–2)
RETICS # AUTO: 55.6 10E9/L (ref 25–95)
RETICS/RBC NFR AUTO: 1.8 % (ref 0.5–2)
SODIUM SERPL-SCNC: 140 MMOL/L (ref 133–144)
SP GR UR STRIP: 1.02 (ref 1–1.03)
SPECIMEN SOURCE: NORMAL
TIBC SERPL-MCNC: 321 UG/DL (ref 240–430)
TRANSFERRIN SERPL-MCNC: 248 MG/DL (ref 210–360)
URN SPEC COLLECT METH UR: ABNORMAL
UROBILINOGEN UR STRIP-MCNC: NORMAL MG/DL (ref 0–2)
WBC # BLD AUTO: 8.7 10E9/L (ref 4–11)
WBC # BLD AUTO: 8.9 10E9/L (ref 4–11)
WBC #/AREA URNS AUTO: 2 /HPF (ref 0–2)

## 2017-07-03 PROCEDURE — 25000125 ZZHC RX 250

## 2017-07-03 PROCEDURE — 96361 HYDRATE IV INFUSION ADD-ON: CPT | Performed by: EMERGENCY MEDICINE

## 2017-07-03 PROCEDURE — 36415 COLL VENOUS BLD VENIPUNCTURE: CPT | Performed by: FAMILY MEDICINE

## 2017-07-03 PROCEDURE — 81001 URINALYSIS AUTO W/SCOPE: CPT | Performed by: EMERGENCY MEDICINE

## 2017-07-03 PROCEDURE — 86304 IMMUNOASSAY TUMOR CA 125: CPT | Performed by: FAMILY MEDICINE

## 2017-07-03 PROCEDURE — 25000132 ZZH RX MED GY IP 250 OP 250 PS 637: Performed by: FAMILY MEDICINE

## 2017-07-03 PROCEDURE — 71020 XR CHEST 2 VW: CPT

## 2017-07-03 PROCEDURE — 25000128 H RX IP 250 OP 636: Performed by: EMERGENCY MEDICINE

## 2017-07-03 PROCEDURE — 83540 ASSAY OF IRON: CPT | Performed by: FAMILY MEDICINE

## 2017-07-03 PROCEDURE — 80048 BASIC METABOLIC PNL TOTAL CA: CPT | Performed by: FAMILY MEDICINE

## 2017-07-03 PROCEDURE — 12000001 ZZH R&B MED SURG/OB UMMC

## 2017-07-03 PROCEDURE — 25000128 H RX IP 250 OP 636: Performed by: FAMILY MEDICINE

## 2017-07-03 PROCEDURE — 83550 IRON BINDING TEST: CPT | Performed by: FAMILY MEDICINE

## 2017-07-03 PROCEDURE — 25000125 ZZHC RX 250: Performed by: FAMILY MEDICINE

## 2017-07-03 PROCEDURE — 00000146 ZZHCL STATISTIC GLUCOSE BY METER IP

## 2017-07-03 PROCEDURE — 85025 COMPLETE CBC W/AUTO DIFF WBC: CPT | Performed by: FAMILY MEDICINE

## 2017-07-03 PROCEDURE — 84466 ASSAY OF TRANSFERRIN: CPT | Performed by: FAMILY MEDICINE

## 2017-07-03 PROCEDURE — 83615 LACTATE (LD) (LDH) ENZYME: CPT | Performed by: FAMILY MEDICINE

## 2017-07-03 PROCEDURE — 85045 AUTOMATED RETICULOCYTE COUNT: CPT | Performed by: FAMILY MEDICINE

## 2017-07-03 PROCEDURE — 83010 ASSAY OF HAPTOGLOBIN QUANT: CPT | Performed by: FAMILY MEDICINE

## 2017-07-03 PROCEDURE — 25000128 H RX IP 250 OP 636

## 2017-07-03 PROCEDURE — 96367 TX/PROPH/DG ADDL SEQ IV INF: CPT | Performed by: EMERGENCY MEDICINE

## 2017-07-03 PROCEDURE — 87641 MR-STAPH DNA AMP PROBE: CPT | Performed by: STUDENT IN AN ORGANIZED HEALTH CARE EDUCATION/TRAINING PROGRAM

## 2017-07-03 PROCEDURE — 99222 1ST HOSP IP/OBS MODERATE 55: CPT | Mod: AI | Performed by: OBSTETRICS & GYNECOLOGY

## 2017-07-03 PROCEDURE — 40000611 ZZHCL STATISTIC MORPHOLOGY W/INTERP HEMEPATH TC 85060: Performed by: FAMILY MEDICINE

## 2017-07-03 PROCEDURE — 87640 STAPH A DNA AMP PROBE: CPT | Performed by: STUDENT IN AN ORGANIZED HEALTH CARE EDUCATION/TRAINING PROGRAM

## 2017-07-03 PROCEDURE — 99212 OFFICE O/P EST SF 10 MIN: CPT

## 2017-07-03 RX ORDER — FERROUS SULFATE 325(65) MG
325 TABLET ORAL DAILY
Status: DISCONTINUED | OUTPATIENT
Start: 2017-07-04 | End: 2017-07-04

## 2017-07-03 RX ORDER — LISINOPRIL 10 MG/1
10 TABLET ORAL DAILY
Status: DISCONTINUED | OUTPATIENT
Start: 2017-07-03 | End: 2017-07-08

## 2017-07-03 RX ORDER — PIPERACILLIN SODIUM, TAZOBACTAM SODIUM 3; .375 G/15ML; G/15ML
3.38 INJECTION, POWDER, LYOPHILIZED, FOR SOLUTION INTRAVENOUS ONCE
Status: COMPLETED | OUTPATIENT
Start: 2017-07-03 | End: 2017-07-03

## 2017-07-03 RX ORDER — ACETAMINOPHEN 500 MG
1000 TABLET ORAL EVERY 8 HOURS PRN
Status: DISCONTINUED | OUTPATIENT
Start: 2017-07-03 | End: 2017-07-08

## 2017-07-03 RX ORDER — ATORVASTATIN CALCIUM 80 MG/1
80 TABLET, FILM COATED ORAL DAILY
Status: DISCONTINUED | OUTPATIENT
Start: 2017-07-03 | End: 2017-07-12 | Stop reason: HOSPADM

## 2017-07-03 RX ORDER — ALBUTEROL SULFATE 90 UG/1
2 AEROSOL, METERED RESPIRATORY (INHALATION) 4 TIMES DAILY PRN
Status: DISCONTINUED | OUTPATIENT
Start: 2017-07-03 | End: 2017-07-12 | Stop reason: HOSPADM

## 2017-07-03 RX ORDER — HYDROMORPHONE HYDROCHLORIDE 1 MG/ML
.3-.5 INJECTION, SOLUTION INTRAMUSCULAR; INTRAVENOUS; SUBCUTANEOUS EVERY 4 HOURS PRN
Status: DISCONTINUED | OUTPATIENT
Start: 2017-07-03 | End: 2017-07-04

## 2017-07-03 RX ORDER — DEXTROSE MONOHYDRATE 25 G/50ML
25-50 INJECTION, SOLUTION INTRAVENOUS
Status: DISCONTINUED | OUTPATIENT
Start: 2017-07-03 | End: 2017-07-12 | Stop reason: HOSPADM

## 2017-07-03 RX ORDER — WARFARIN SODIUM 5 MG/1
TABLET ORAL
Qty: 30 TABLET | Refills: 0 | Status: SHIPPED | OUTPATIENT
Start: 2017-07-03 | End: 2017-07-03 | Stop reason: ALTCHOICE

## 2017-07-03 RX ORDER — ONDANSETRON 4 MG/1
4 TABLET, ORALLY DISINTEGRATING ORAL EVERY 6 HOURS PRN
Status: DISCONTINUED | OUTPATIENT
Start: 2017-07-03 | End: 2017-07-09

## 2017-07-03 RX ORDER — NICOTINE POLACRILEX 4 MG
15-30 LOZENGE BUCCAL
Status: DISCONTINUED | OUTPATIENT
Start: 2017-07-03 | End: 2017-07-12 | Stop reason: HOSPADM

## 2017-07-03 RX ORDER — AMOXICILLIN 250 MG
1-2 CAPSULE ORAL 2 TIMES DAILY
Status: DISCONTINUED | OUTPATIENT
Start: 2017-07-03 | End: 2017-07-06

## 2017-07-03 RX ORDER — SODIUM CHLORIDE 9 MG/ML
INJECTION, SOLUTION INTRAVENOUS CONTINUOUS
Status: DISCONTINUED | OUTPATIENT
Start: 2017-07-03 | End: 2017-07-03

## 2017-07-03 RX ORDER — MULTIPLE VITAMINS W/ MINERALS TAB 9MG-400MCG
1 TAB ORAL DAILY
Status: DISCONTINUED | OUTPATIENT
Start: 2017-07-04 | End: 2017-07-12 | Stop reason: HOSPADM

## 2017-07-03 RX ORDER — NALOXONE HYDROCHLORIDE 0.4 MG/ML
.1-.4 INJECTION, SOLUTION INTRAMUSCULAR; INTRAVENOUS; SUBCUTANEOUS
Status: DISCONTINUED | OUTPATIENT
Start: 2017-07-03 | End: 2017-07-09

## 2017-07-03 RX ORDER — ESCITALOPRAM OXALATE 20 MG/1
20 TABLET ORAL DAILY
Status: DISCONTINUED | OUTPATIENT
Start: 2017-07-03 | End: 2017-07-12 | Stop reason: HOSPADM

## 2017-07-03 RX ORDER — ONDANSETRON 2 MG/ML
4 INJECTION INTRAMUSCULAR; INTRAVENOUS EVERY 6 HOURS PRN
Status: DISCONTINUED | OUTPATIENT
Start: 2017-07-03 | End: 2017-07-09

## 2017-07-03 RX ORDER — PIPERACILLIN SODIUM, TAZOBACTAM SODIUM 3; .375 G/15ML; G/15ML
3.38 INJECTION, POWDER, LYOPHILIZED, FOR SOLUTION INTRAVENOUS EVERY 6 HOURS
Status: DISCONTINUED | OUTPATIENT
Start: 2017-07-03 | End: 2017-07-04

## 2017-07-03 RX ORDER — FLUTICASONE PROPIONATE 50 MCG
2 SPRAY, SUSPENSION (ML) NASAL DAILY
Status: DISCONTINUED | OUTPATIENT
Start: 2017-07-03 | End: 2017-07-12 | Stop reason: HOSPADM

## 2017-07-03 RX ADMIN — PIPERACILLIN AND TAZOBACTAM 3.38 G: 3; .375 INJECTION, POWDER, LYOPHILIZED, FOR SOLUTION INTRAVENOUS; PARENTERAL at 08:07

## 2017-07-03 RX ADMIN — HYDROMORPHONE HYDROCHLORIDE 0.5 MG: 1 INJECTION, SOLUTION INTRAMUSCULAR; INTRAVENOUS; SUBCUTANEOUS at 05:43

## 2017-07-03 RX ADMIN — SODIUM CHLORIDE 1000 ML: 9 INJECTION, SOLUTION INTRAVENOUS at 01:27

## 2017-07-03 RX ADMIN — HYDROMORPHONE HYDROCHLORIDE 0.5 MG: 1 INJECTION, SOLUTION INTRAMUSCULAR; INTRAVENOUS; SUBCUTANEOUS at 19:37

## 2017-07-03 RX ADMIN — PIPERACILLIN SODIUM,TAZOBACTAM SODIUM 3.38 G: 3; .375 INJECTION, POWDER, FOR SOLUTION INTRAVENOUS at 01:26

## 2017-07-03 RX ADMIN — HYDROMORPHONE HYDROCHLORIDE 0.5 MG: 1 INJECTION, SOLUTION INTRAMUSCULAR; INTRAVENOUS; SUBCUTANEOUS at 01:20

## 2017-07-03 RX ADMIN — FLUTICASONE PROPIONATE 2 SPRAY: 50 SPRAY, METERED NASAL at 09:02

## 2017-07-03 RX ADMIN — RIVAROXABAN 20 MG: 20 TABLET, FILM COATED ORAL at 04:58

## 2017-07-03 RX ADMIN — SENNOSIDES AND DOCUSATE SODIUM 2 TABLET: 8.6; 5 TABLET ORAL at 20:03

## 2017-07-03 RX ADMIN — HYDROMORPHONE HYDROCHLORIDE 0.5 MG: 1 INJECTION, SOLUTION INTRAMUSCULAR; INTRAVENOUS; SUBCUTANEOUS at 11:20

## 2017-07-03 RX ADMIN — SODIUM CHLORIDE: 9 INJECTION, SOLUTION INTRAVENOUS at 04:57

## 2017-07-03 RX ADMIN — LISINOPRIL 10 MG: 10 TABLET ORAL at 20:00

## 2017-07-03 RX ADMIN — ESCITALOPRAM OXALATE 20 MG: 20 TABLET ORAL at 09:01

## 2017-07-03 RX ADMIN — RIVAROXABAN 20 MG: 20 TABLET, FILM COATED ORAL at 22:08

## 2017-07-03 RX ADMIN — PIPERACILLIN AND TAZOBACTAM 3.38 G: 3; .375 INJECTION, POWDER, LYOPHILIZED, FOR SOLUTION INTRAVENOUS; PARENTERAL at 14:19

## 2017-07-03 RX ADMIN — LIDOCAINE HYDROCHLORIDE,EPINEPHRINE BITARTRATE 20 ML: 10; .01 INJECTION, SOLUTION INFILTRATION; PERINEURAL at 01:38

## 2017-07-03 RX ADMIN — VANCOMYCIN HYDROCHLORIDE 1500 MG: 10 INJECTION, POWDER, LYOPHILIZED, FOR SOLUTION INTRAVENOUS at 23:10

## 2017-07-03 RX ADMIN — PIPERACILLIN AND TAZOBACTAM 3.38 G: 3; .375 INJECTION, POWDER, LYOPHILIZED, FOR SOLUTION INTRAVENOUS; PARENTERAL at 20:03

## 2017-07-03 RX ADMIN — VANCOMYCIN HYDROCHLORIDE 1500 MG: 10 INJECTION, POWDER, LYOPHILIZED, FOR SOLUTION INTRAVENOUS at 11:21

## 2017-07-03 ASSESSMENT — ENCOUNTER SYMPTOMS
HEADACHES: 0
COUGH: 1
SORE THROAT: 0
DIZZINESS: 0
DYSURIA: 0
HEMATURIA: 0
NAUSEA: 0
FEVER: 1
WOUND: 1
FREQUENCY: 0
SHORTNESS OF BREATH: 1
ABDOMINAL PAIN: 1
VOMITING: 0
APPETITE CHANGE: 1
LIGHT-HEADEDNESS: 0
CONSTIPATION: 1
PALPITATIONS: 1
RHINORRHEA: 0
FATIGUE: 1

## 2017-07-03 ASSESSMENT — PAIN DESCRIPTION - DESCRIPTORS
DESCRIPTORS: SORE
DESCRIPTORS: PRESSURE
DESCRIPTORS: SORE

## 2017-07-03 NOTE — TELEPHONE ENCOUNTER
Per her very recent hospital H&P:  Originally started on warfarin but then was switched to xarelto by Hematologist.

## 2017-07-03 NOTE — PLAN OF CARE
Problem: Goal Outcome Summary  Goal: Goal Outcome Summary  Outcome: No Change  Pt arrived to floor @0330 from [facility] ED. Was running temp, slightly tachy per baseline, AOVSS on RA. Pt unable to take deep breaths without pain from pelvic mass, however; LS clear. Reports pain as abdominal pressure that is intermittently stabbing, managed with IV dilaudid x1 this shift. Pt refused offered tylenol. Pt has known DVT on lower left limb below the knee & has had a dog bite since this past Tuesday 6/26. Dog bite was drained & cultured at [facility]; gauze dressing over packing was soaked with bright red blood. Writer changed dressing this am, per resident, did not remove packing but changed gauze & reinforced with ABD over it. Awaiting [facility] assessment for wound care orders. Up with assist of 1, pt has difficulty ambulating d/t pain from mass & dog bite. Voided spont overnight with cloudy foster urine, has had some dribbling and incontinence issues as well. UA sent. NPO, sips OK. Needs admit req.

## 2017-07-03 NOTE — PROGRESS NOTES
Discussed with family medicine resident plan for Hafsa Corona.  Patient is scheduled for surgery on 7/7/17 at 1440. She needs pre-op clearance and an IVC filter placed prior to surgery.     Penny Quan MD PhD  Ob/Gyn PGY-2  7/3/2017 6:47 PM

## 2017-07-03 NOTE — H&P
"[facility]: Gynecology Oncology Consult    Hafsa Corona MRN# [id number]   Age: 62 year old YOB: 1955     Date of Admission:  2017    Primary care provider: Morelia Ayon             Chief Complaint:   Abdominal pain and distention         History of Present Illness:   This patient is a 62 year old female with PMH of DM type II and past surgical history pertinent for RSO for endometrotic cyst of the ovary who presented with sudden onset of severe diffuse abdominal pain that began yesterday evening. Patient also reports a one month history of abdominal bloating and distention. She was originally seen in the ED on 17 for severe abdominal pain and was found to have a [id number] pelvis mass- she was told to follow up with gyn oncology as an outpatient. On  pt had another episode of severe abdominal pain along with 2  episodes of urinary incontinence prompting her to return to the ED. She was admitted by the family medicine service for further workup. She reports continued diffuse abdominal pain that, controlled with IV dilaudid. She has also experienced anorexia over the past 2 days and feeling more tired that usual. Some SOB due to \"increased pressure on the lungs from her abdomen\". Her stools have been \"hard and small\" over the past 2 days, her last normal stool was Saturday morning. She denies chest pain, palpitations, dysuria, hematuria, vaginal bleeding or vaginal discharge. Of note she was diagnosed with a LLE DVT on 17. This was unprovoked. Originally started on warfarin but then was switched to xarelto by Hematologist.    Ob/Gyn History   - Hx of 2 elective abortions  Endometrial ablation for menorrhagia 2/2 uterine fibroids in   RSO 2/2 endometrioma in .  Menopause in  after endometrial ablation, no HRT.  Longest OCP use was 4 years.   FH: Mother w/ breast CA and colon CA, father with pancreatic CA. No FH of female reproductive CA    Preventative " screening has been done in combination with her primary care provider and her primary Ob/Gyn.  She receives regular gynecologic care through [facility] and her last visit was in 2015. She still has her left ovary, uterus and cervix. She was told in 2015 that she had a cyst on her left ovary. She reports no abnormal pap smears, last one was in 2015. Negative colonoscopy in 2013. Normal mammogram in 2014.          Cancer Treatment History:   N/A         Past Medical History:     Past Medical History:   Diagnosis Date     Anxiety state, unspecified     [id number]     Attention deficit disorder without mention of hyperactivity      Chronic rhinitis      Depressive disorder, not elsewhere classified      Panic disorder without agoraphobia      Pure hypercholesterolemia     Lipitor     Tachycardia, unspecified     9/1999-2/2004     Type II or unspecified type diabetes mellitus without mention of complication, not stated as uncontrolled     diagnosed 2004            Past Surgical History:      Past Surgical History:   Procedure Laterality Date     SALPINGO OOPHORECTOMY,R/L/KAYY  2008    Salpingo Oophorectomy, RT     SURGICAL HISTORY OF -       facial surgery d/t fall in 1/2002     SURGICAL HISTORY OF -       1985 removal of breast cyst     SURGICAL HISTORY OF -   2008    endometrial ablation            Social History:     Social History   Substance Use Topics     Smoking status: Never Smoker     Smokeless tobacco: Never Used     Alcohol use Yes      Comment: rarely            Family History:     Family History   Problem Relation Age of Onset     C.A.D. Father      DIABETES Father      Hypertension Father      CEREBROVASCULAR DISEASE Father      Psychotic Disorder Father      Pancreatic Cancer Father      C.A.D. Mother      Hypertension Mother      Breast Cancer Mother      Psychotic Disorder Mother      Colon Cancer Mother      CANCER Mother      Bone cancer     Prostate Problems Brother      cleared      Psychotic Disorder  Sister      x2     Neurologic Disorder Sister      Psychotic Disorder Brother      x2     Depression Brother      major depressive disorder and OCD     Anxiety Disorder Brother      MENTAL ILLNESS Brother      Psychotic Disorder Maternal Grandmother      ?     Psychotic Disorder Maternal Grandfather      ?     Psychotic Disorder Paternal Grandmother      Schizophernia     Psychotic Disorder Paternal Grandfather      ?     Respiratory Paternal Grandfather       of emphysema and smoking     Psychotic Disorder Sister      Other - See Comments Sister      small kidney stone      Cancer - colorectal No family hx of             Immunizations:     Immunization History   Administered Date(s) Administered     Influenza (IIV3) 2006, 10/11/2007, 2008, 2009, 01/10/2013     Influenza Vaccine IM 3yrs+ 4 Valent IIV4 2013, 2016, 2016     Influenza Vaccine, 3 YRS +, IM (QUADRIVALENT W/PRESERVATIVES) 2014     Pneumococcal 23 valent 2013     TDAP Vaccine (Adacel) 2016            Allergies:     Allergies   Allergen Reactions     Sulfa Drugs      Wasps [Hornets]             Medications:     Current Facility-Administered Medications   Medication     albuterol (PROAIR HFA/PROVENTIL HFA/VENTOLIN HFA) Inhaler 2 puff     atorvastatin (LIPITOR) tablet 80 mg     cholecalciferol (vitamin D) tablet 2,000 Units     escitalopram (LEXAPRO) tablet 20 mg     fluticasone (FLONASE) 50 MCG/ACT spray 2 spray     lisinopril (PRINIVIL/ZESTRIL) tablet 10 mg     naloxone (NARCAN) injection 0.1-0.4 mg     Patient is already receiving anticoagulation with heparin, enoxaparin (LOVENOX), warfarin (COUMADIN)  or other anticoagulant medication     0.9% sodium chloride infusion     senna-docusate (SENOKOT-S;PERICOLACE) 8.6-50 MG per tablet 1-2 tablet     ondansetron (ZOFRAN-ODT) ODT tab 4 mg    Or     ondansetron (ZOFRAN) injection 4 mg     glucose 40 % gel 15-30 g    Or     dextrose 50 % injection 25-50 mL     Or     glucagon injection 1 mg     insulin aspart (NovoLOG) inj (RAPID ACTING)     insulin aspart (NovoLOG) inj (RAPID ACTING)     piperacillin-tazobactam (ZOSYN) 3.375 g vial to attach to  mL bag     vancomycin (VANCOCIN) 1,500 mg in NaCl 0.9 % 250 mL intermittent infusion     HYDROmorphone (PF) (DILAUDID) injection 0.3-0.5 mg     acetaminophen (TYLENOL) tablet 1,000 mg     rivaroxaban ANTICOAGULANT (XARELTO) tablet 20 mg     HYDROmorphone (PF) (DILAUDID) injection 0.5 mg     Current Outpatient Prescriptions on File Prior to Encounter:  rivaroxaban ANTICOAGULANT (XARELTO) 20 MG TABS tablet Take 1 tablet (20 mg) by mouth daily (with dinner)   Digestive Enzymes (DIGESTIVE ENZYME PO) Take by mouth as needed    CALCIUM-MAGNESIUM-VITAMIN D PO Take 2 tablets by mouth daily   Probiotic Product (PRO-BIOTIC BLEND PO) Take 1 capsule by mouth daily Enzymatic Therapy-probiotic pearls   atorvastatin (LIPITOR) 80 MG tablet Take 1 tablet (80 mg) by mouth daily   lisinopril (PRINIVIL/ZESTRIL) 10 MG tablet Take 1 tablet (10 mg) by mouth daily   metFORMIN (GLUCOPHAGE-XR) 500 MG 24 hr tablet Take two tabs by mouth once daily with evening meal.   escitalopram (LEXAPRO) 20 MG tablet Take 1 tablet (20 mg) by mouth daily   VYVANSE 50 MG capsule Reported on 4/11/2017   fluticasone (FLONASE) 50 MCG/ACT nasal spray Spray 2 sprays into both nostrils daily   Cholecalciferol (VITAMIN D) 1000 UNIT capsule Take 2,000 Units by mouth daily    MULTIVITAMIN OR Daily.   amoxicillin-clavulanate (AUGMENTIN) 875-125 MG per tablet Take 1 tablet by mouth 2 times daily for 10 days   traMADol (ULTRAM) 50 MG tablet Take 1 tablet (50 mg) by mouth every 6 hours as needed for pain maximum 4 tablet(s) per day   order for DME Compression stockings 20-30 mm Hg. To be wear when directed by Hematology team and while awake for the first two years post clot.   order for DME Equipment being ordered: blood pressure monitor   EPINEPHrine (EPIPEN) 0.3 MG/0.3ML  "injection Inject 0.3 mLs (0.3 mg) into the muscle once as needed for anaphylaxis   albuterol (ALBUTEROL) 108 (90 BASE) MCG/ACT inhaler Inhale 2 puffs into the lungs 4 times daily as needed for shortness of breath / dyspnea   UNKNOWN MED DOSAGE vitamin B 50              Review of Systems:   CONSTITUTIONAL:  positive for  fevers most likely due to recent dog bite, fatigue, anorexia. No unintentional weight loss.  RESPIRATORY:  positive for SOB   CARDIOVASCULAR:  Negative for chest pain, palpitations  GASTROINTESTINAL:  positive for constipation, abdominal mass, abdominal distention, and abdominal pain.   GENITOURINARY:  negative for frequency, dysuria, nocturia and hematuria. Patient had 2 episodes of urinary incontinence in the last 2 days.   HEMATOLOGIC/LYMPHATIC:  negative for easy bruising and bleeding  ENDOCRINE:  positive for history of well controlled diabetes. Per patient reports last HA1C was <7%.          Physical Exam:     Vitals:    07/03/17 0324 07/03/17 0335 07/03/17 0556 07/03/17 1123   BP: 132/79  118/75 118/73   BP Location: Right arm  Right arm    Pulse: 102  98 98   Resp: 16  18 20   Temp: 98.8  F (37.1  C)  99.3  F (37.4  C) 99.9  F (37.7  C)   TempSrc: Oral  Oral Oral   SpO2: 93%  93%    Weight:  92.4 kg (203 lb 12.8 oz)     Height:  1.613 m (5' 3.5\")       General: in no acute distress, pleasant and interactive  CV: RRR, systolic ejection murmur best heard at RUSB.   Resp: Mildly labored breathing when talking. Intermittent wheezes heart in right lung base, clear with continued deep breathing. No crackles.   Abdomen: Distended, tympanitic abdomen. Firm mass palpable in the LLQ. Mild tenderness to palpation in lower quadrants. Bowel sounds present.   : Normal appearing external genitalia. Cervix appears normal without visible lesions. Scant white discharge from cervical os. Large, firm mass palpable on bimanual exam in the LLQ. No tenderness with bimanual exam.   Extremities: 2+left LE edema in " the ankle and calf. No erythema present near bite site on left lateral thigh, wound covered by bandage. 1+ right LE edema          Data:     Results for orders placed or performed during the hospital encounter of 07/02/17 (from the past 24 hour(s))   Blood culture   Result Value Ref Range    Specimen Description Blood Left Arm     Special Requests Aerobic and anaerobic bottles received     Culture Micro No growth after 3 hours     Micro Report Status Pending    CBC with platelets differential   Result Value Ref Range    WBC 9.3 4.0 - 11.0 10e9/L    RBC Count 3.15 (L) 3.8 - 5.2 10e12/L    Hemoglobin 8.6 (L) 11.7 - 15.7 g/dL    Hematocrit 26.8 (L) 35.0 - 47.0 %    MCV 85 78 - 100 fl    MCH 27.3 26.5 - 33.0 pg    MCHC 32.1 31.5 - 36.5 g/dL    RDW 14.2 10.0 - 15.0 %    Platelet Count 296 150 - 450 10e9/L    Diff Method Automated Method     % Neutrophils 87.0 %    % Lymphocytes 7.9 %    % Monocytes 4.8 %    % Eosinophils 0.1 %    % Basophils 0.1 %    % Immature Granulocytes 0.1 %    Nucleated RBCs 0 0 /100    Absolute Neutrophil 8.1 1.6 - 8.3 10e9/L    Absolute Lymphocytes 0.7 (L) 0.8 - 5.3 10e9/L    Absolute Monocytes 0.5 0.0 - 1.3 10e9/L    Absolute Eosinophils 0.0 0.0 - 0.7 10e9/L    Absolute Basophils 0.0 0.0 - 0.2 10e9/L    Abs Immature Granulocytes 0.0 0 - 0.4 10e9/L    Absolute Nucleated RBC 0.0    INR   Result Value Ref Range    INR 1.17 (H) 0.86 - 1.14   Comprehensive metabolic panel   Result Value Ref Range    Sodium 141 133 - 144 mmol/L    Potassium 4.1 3.4 - 5.3 mmol/L    Chloride 104 94 - 109 mmol/L    Carbon Dioxide 25 20 - 32 mmol/L    Anion Gap 12 3 - 14 mmol/L    Glucose 129 (H) 70 - 99 mg/dL    Urea Nitrogen 19 7 - 30 mg/dL    Creatinine 0.84 0.52 - 1.04 mg/dL    GFR Estimate 68 >60 mL/min/1.7m2    GFR Estimate If Black 83 >60 mL/min/1.7m2    Calcium 8.4 (L) 8.5 - 10.1 mg/dL    Bilirubin Total 0.7 0.2 - 1.3 mg/dL    Albumin 3.2 (L) 3.4 - 5.0 g/dL    Protein Total 6.6 (L) 6.8 - 8.8 g/dL    Alkaline  Phosphatase 92 40 - 150 U/L    ALT 14 0 - 50 U/L    AST 27 0 - 45 U/L   Lactic acid whole blood   Result Value Ref Range    Lactic Acid 0.7 0.7 - 2.1 mmol/L   Blood culture   Result Value Ref Range    Specimen Description Blood Right Arm     Special Requests Aerobic and anaerobic bottles received     Culture Micro No growth after 3 hours     Micro Report Status Pending        CT ABDOMEN AND PELVIS WITH CONTRAST 7/1/2017 7:42 PM      HISTORY: Abdominal pain, fullness.     COMPARISON: None.     TECHNIQUE: Volumetric helical acquisition of CT images from the lung  bases through the symphysis pubis after the administration of 100 mL  Isovue 370  intravenous contrast. Radiation dose for this scan was  reduced using automated exposure control, adjustment of the mA and/or  kV according to patient size, or iterative reconstruction technique.     FINDINGS: There is a 25.1 x 20.4 x 16.5 cm cystic mass in the abdomen  and pelvis. There is a suggestion of more complex partially enhancing  9.8 x 12.7 x 9.5 cm. This presumably is arising from the right  ovary/adnexa given the mass effect on the adjacent uterus. A normal  right or left ovary is not well seen. Small amount of free fluid.  Appendix unremarkable. No bowel obstruction. No free air. No  hydronephrosis. Kidneys, adrenal glands, spleen, and liver demonstrate  no worrisome focal lesion. Some increased soft tissue attenuation is  in the omentum, particularly the left upper quadrant. This could be  related to the ascites but omental infiltration is not excluded. Lung  base is clear of acute infiltrates or masses. No frankly destructive  bony lesions.         IMPRESSION: Large cystic and solid lesion probably arising from the  right ovary/adnexa. Benign and malignant lesions are considerations in  the differential. Small amount of pelvic free fluid which is  nonspecific.      CHELSEA VINES MD    US Pelvic Complete w Transvaginal & Abd/Pel Duplex Limited    Narrative    US  "PELVIS COMPLETE WITH TRANSVAGINAL AND DOPPLER LIMITED    7/3/2017  12:18 AM     HISTORY: Pelvic pain. Status post right oophorectomy.    TECHNIQUE: Transvaginal images were performed to better evaluate the  patient's uterus, ovaries and endometrial stripe. Spectral Doppler and  wave form analysis was also performed to evaluate blood flow to the  ovaries.    COMPARISON: CT of the abdomen and pelvis performed 7/1/2017.    FINDINGS: The uterus is measured at 8.4 x 6.2 x 4.1 cm. No fibroids  are evident. Endometrial stripe could not be visualized. The right  ovary is not seen, consistent with history of recent right  oophorectomy. The left ovary is unremarkable. There is a large complex  pelvic mass, difficult to measure due to its large size. The origin of  this mass could not be identified on this exam and this finding was  better seen on CT. No free pelvic fluid is present. Spectral Doppler  and waveform analysis demonstrate blood flow to the left ovary.      Impression    IMPRESSION:  1. Large complex pelvic mass is difficult to measure due to its large  size, and the origin of this mass is not identified on this exam. This  finding was better visualized on the recent CT scan.   2. There is a large amount of slightly complex free fluid in the  pelvis.    NIRALI AMBRIZ MD   Incision and drainage    Narrative    Thelma Lopez MD     7/3/2017  1:04 AM  Incision + drainage  Date/Time: 7/3/2017 1:03 AM  Performed by: THELMA LOPEZ  Authorized by: THELMA LOPEZ   Consent: Verbal consent obtained.  Risks and benefits: risks, benefits and alternatives were discussed  Consent given by: patient  Time out: Immediately prior to procedure a \"time out\" was called to verify   the correct patient, procedure, equipment, support staff and site/side   marked as required.  Type: abscess  Body area: lower extremity  Location details: left leg  Anesthesia: local infiltration    Anesthesia:  Anesthesia: local " infiltration  Local Anesthetic: lidocaine 1% with epinephrine   Anesthetic total: 5 mL  Sedation:  Patient sedated: no    Risk factor: coagulopathy  Scalpel size: 11  Incision type: single straight  Incision depth: dermal  Complexity: simple  Drainage: serosanguinous  Drainage amount: scant  Wound treatment: wound left open  Packing material: 1/4 in iodoform gauze  Patient tolerance: Patient tolerated the procedure well with no immediate   complications     UA with Microscopic   Result Value Ref Range    Color Urine Yellow     Appearance Urine Slightly Cloudy     Glucose Urine Negative NEG mg/dL    Bilirubin Urine Negative NEG    Ketones Urine 40 (A) NEG mg/dL    Specific Gravity Urine 1.020 1.003 - 1.035    Blood Urine Trace (A) NEG    pH Urine 5.0 5.0 - 7.0 pH    Protein Albumin Urine 30 (A) NEG mg/dL    Urobilinogen mg/dL Normal 0.0 - 2.0 mg/dL    Nitrite Urine Negative NEG    Leukocyte Esterase Urine Negative NEG    Source Clean catch urine     WBC Urine 2 0 - 2 /HPF    RBC Urine 5 (H) 0 - 2 /HPF    Bacteria Urine Moderate (A) NEG /HPF    Mucous Urine Present (A) NEG /LPF    Amorphous Crystals Few (A) NEG /HPF   Glucose by meter   Result Value Ref Range    Glucose 136 (H) 70 - 99 mg/dL   Glucose by meter   Result Value Ref Range    Glucose 116 (H) 70 - 99 mg/dL   Basic metabolic panel   Result Value Ref Range    Sodium 140 133 - 144 mmol/L    Potassium 3.9 3.4 - 5.3 mmol/L    Chloride 106 94 - 109 mmol/L    Carbon Dioxide 26 20 - 32 mmol/L    Anion Gap 8 3 - 14 mmol/L    Glucose 112 (H) 70 - 99 mg/dL    Urea Nitrogen 17 7 - 30 mg/dL    Creatinine 0.88 0.52 - 1.04 mg/dL    GFR Estimate 65 >60 mL/min/1.7m2    GFR Estimate If Black 78 >60 mL/min/1.7m2    Calcium 8.5 8.5 - 10.1 mg/dL   CBC with platelets differential   Result Value Ref Range    WBC 8.7 4.0 - 11.0 10e9/L    RBC Count 3.18 (L) 3.8 - 5.2 10e12/L    Hemoglobin 8.4 (L) 11.7 - 15.7 g/dL    Hematocrit 27.0 (L) 35.0 - 47.0 %    MCV 85 78 - 100 fl    MCH  26.4 (L) 26.5 - 33.0 pg    MCHC 31.1 (L) 31.5 - 36.5 g/dL    RDW 14.6 10.0 - 15.0 %    Platelet Count 296 150 - 450 10e9/L    Diff Method Automated Method     % Neutrophils 71.5 %    % Lymphocytes 16.7 %    % Monocytes 10.8 %    % Eosinophils 0.6 %    % Basophils 0.2 %    % Immature Granulocytes 0.2 %    Nucleated RBCs 0 0 /100    Absolute Neutrophil 6.2 1.6 - 8.3 10e9/L    Absolute Lymphocytes 1.5 0.8 - 5.3 10e9/L    Absolute Monocytes 0.9 0.0 - 1.3 10e9/L    Absolute Eosinophils 0.1 0.0 - 0.7 10e9/L    Absolute Basophils 0.0 0.0 - 0.2 10e9/L    Abs Immature Granulocytes 0.0 0 - 0.4 10e9/L    Absolute Nucleated RBC 0.0              Assessment and Plan:   Assessment: 62 year old female with abdominal pain and distension with 25.1x20.4x16.5 cm pelvic mass found on CT admitted for workup and further management of pelvic mass.      Reccomendations:  # Pelvic mass: Imaging highly concerning for gyn origin. CA-125 pending. DDx benign, borderline, and malignant masses. Given appearance on imaging and patient symptomatology, recommend surgical management for treatment definitive diagnosis.   - Focus on optimizing patient's comorbid conditions in light of pending surgery     #LLE DVT: May be 2/2 venous compression due to large pelvic mass vs hypercoagulability due to malignancy.   - Okay to continue Xarelto for now, however will need to hold x24 hours prior to surgical management    ID: s/p dog bite 6/27 with resulting lower extremity abscess. Resolving on IV antibiotics.    Gyn Onc team will continue to follow daily, please do not hesitate to contact us with any questions or concerns.     Corry Segura MD  Ob/Gyn PGY-1  Gyn Onc pager: [phone]  07/03/17 12:22 PM        I have seen and examined the patient.  I have reviewed and edited Dr. Segura's note above.  Plan to go to the OR Friday 7/7/17 for exploratory laparotomy, bilateral salpingo-oophorectomy; possible cancer staging including hysterectomy, pelvic/para-aortic  lymphadenectomy, omentectomy, peritoneal biopsies.  Will need IVC filter placed prior to surgery.  Will need to hold anticoagulation x24 hours prior to surgery.    Serena Cole  7/3/2017  3:36 PM

## 2017-07-03 NOTE — ED PROVIDER NOTES
History     Chief Complaint   Patient presents with     Pelvic Pain     Pelvic and abdominal pain. Having a hard time moving.     HPI  Hafsa Corona is a 62 year old female with a history of DM Type II, anxiety, hyperlipidemia, chronic rhinitis, right oophorectomy (2008) and DVT on xarelto who presents in the Emergency Department for evaluation of pelvic pain.  The patient was seen yesterday (7/1/17) in the ED with the same symptoms that started at 5:00pm yesterday (29 hours ago). The patient reports her pelvic pain has worsened today and is associated with spasms and radiation of pain into her lower back. She says she has experienced incontinence of bladder, dysuria, and anal pain when passing two small BMs today. She says she hasn't eaten much today and has felt nauseated without vomiting. She underwent CT of the abdomen yesterday that showed a large pelvic mass.  The patient confirms her right oophorectomy in 2008 and also reports she had a small cyst found on her left ovary about two years ago and thinks the current cyst has arisen from that.     In addition, patient notes a dog bit that occurred 5 days ago.  She has had a large red lump on the leg and was given augmentin from the ED yesterday for developing infection.  Of note, the patient admits she did not fill the prescriptions given to her yesterday because she has been weak all day. She does not she has developed fevers today.     Past Medical History:   Diagnosis Date     Anxiety state, unspecified     [id number]     Attention deficit disorder without mention of hyperactivity      Chronic rhinitis      Depressive disorder, not elsewhere classified      Panic disorder without agoraphobia      Pure hypercholesterolemia     Lipitor     Tachycardia, unspecified     9/1999-2/2004     Type II or unspecified type diabetes mellitus without mention of complication, not stated as uncontrolled     diagnosed 2004       Past Surgical History:   Procedure Laterality  Date     SALPINGO OOPHORECTOMY,R/L/KAYY      Salpingo Oophorectomy, RT     SURGICAL HISTORY OF -       facial surgery d/t fall in 2002     SURGICAL HISTORY OF -        removal of breast cyst     SURGICAL HISTORY OF -       endometrial ablation       Family History   Problem Relation Age of Onset     C.A.D. Father      DIABETES Father      Hypertension Father      CEREBROVASCULAR DISEASE Father      Psychotic Disorder Father      Pancreatic Cancer Father      C.A.D. Mother      Hypertension Mother      Breast Cancer Mother      Psychotic Disorder Mother      Colon Cancer Mother      CANCER Mother      Bone cancer     Prostate Problems Brother      cleared      Psychotic Disorder Sister      x2     Neurologic Disorder Sister      Psychotic Disorder Brother      x2     Depression Brother      major depressive disorder and OCD     Anxiety Disorder Brother      MENTAL ILLNESS Brother      Psychotic Disorder Maternal Grandmother      ?     Psychotic Disorder Maternal Grandfather      ?     Psychotic Disorder Paternal Grandmother      Schizophernia     Psychotic Disorder Paternal Grandfather      ?     Respiratory Paternal Grandfather       of emphysema and smoking     Psychotic Disorder Sister      Other - See Comments Sister      small kidney stone      Cancer - colorectal No family hx of        Social History   Substance Use Topics     Smoking status: Never Smoker     Smokeless tobacco: Never Used     Alcohol use Yes      Comment: rarely       No current facility-administered medications for this encounter.      Current Outpatient Prescriptions   Medication     rivaroxaban ANTICOAGULANT (XARELTO) 20 MG TABS tablet     Digestive Enzymes (DIGESTIVE ENZYME PO)     CALCIUM-MAGNESIUM-VITAMIN D PO     Probiotic Product (PRO-BIOTIC BLEND PO)     atorvastatin (LIPITOR) 80 MG tablet     lisinopril (PRINIVIL/ZESTRIL) 10 MG tablet     metFORMIN (GLUCOPHAGE-XR) 500 MG 24 hr tablet     escitalopram (LEXAPRO) 20 MG  tablet     VYVANSE 50 MG capsule     fluticasone (FLONASE) 50 MCG/ACT nasal spray     Cholecalciferol (VITAMIN D) 1000 UNIT capsule     MULTIVITAMIN OR     amoxicillin-clavulanate (AUGMENTIN) 875-125 MG per tablet     traMADol (ULTRAM) 50 MG tablet     order for DME     order for DME     EPINEPHrine (EPIPEN) 0.3 MG/0.3ML injection     albuterol (ALBUTEROL) 108 (90 BASE) MCG/ACT inhaler     UNKNOWN MED DOSAGE        Allergies   Allergen Reactions     Sulfa Drugs      Wasps [Hornets]        I have reviewed the Medications, Allergies, Past Medical and Surgical History, and Social History in the Epic system.    Review of Systems   Constitutional: Positive for appetite change (decreased) and fever (subjective).   Respiratory: Positive for shortness of breath.    Cardiovascular: Negative for chest pain.   Gastrointestinal: Positive for abdominal pain (with associated spasms) and rectal pain.   Genitourinary: Positive for dysuria.   Skin: Positive for wound (dog bite on left lower extremitiy).   All other systems reviewed and are negative.      Physical Exam   BP: 124/83  Pulse: 124  Temp: 100.9  F (38.3  C)  Resp: 16  Weight: 95.6 kg (210 lb 12.8 oz)  SpO2: 93 %  Physical Exam  General: patient is alert and oriented and in no acute distress   Head: atraumatic and normocephalic   EENT: moist mucus membranes without tonsillar erythema or exudates, pupils round and reactive   Neck: supple   Cardiovascular: regular rate and rhythm, extremities warm and well perfused, no lower extremity edema  Pulmonary: lungs clear to auscultation bilaterally   Abdomen: soft, non-tender   Musculoskeletal: normal range of motion   Neurological: alert and oriented, moving all extremities symmetrically, gait normal   Skin: warm, dry     ED Course     ED Course     Incision + drainage  Date/Time: 7/3/2017 1:03 AM  Performed by: CHARLES HAMMONDS  Authorized by: CHARLES HAMMONDS   Consent: Verbal consent obtained.  Risks and benefits:  "risks, benefits and alternatives were discussed  Consent given by: patient  Time out: Immediately prior to procedure a \"time out\" was called to verify the correct patient, procedure, equipment, support staff and site/side marked as required.  Type: abscess  Body area: lower extremity  Location details: left leg  Anesthesia: local infiltration    Anesthesia:  Anesthesia: local infiltration  Local Anesthetic: lidocaine 1% with epinephrine   Anesthetic total: 5 mL  Sedation:  Patient sedated: no    Risk factor: coagulopathy  Scalpel size: 11  Incision type: single straight  Incision depth: dermal  Complexity: simple  Drainage: serosanguinous  Drainage amount: scant  Wound treatment: wound left open  Packing material: 1/4 in iodoform gauze  Patient tolerance: Patient tolerated the procedure well with no immediate complications             10:25 PM  The patient was seen and examined by Dr. Lopez in Room ED02.          EKG Interpretation:      Interpreted by Thelma Lopez  Time reviewed: 2255  Symptoms at time of EKG: abdominal pain   Rhythm: sinus tachycardia  Rate: Tachycardia  Axis: Normal  Ectopy: none  Conduction: normal  ST Segments/ T Waves: No acute ischemic changes  Q Waves: none  Comparison to prior: Unchanged    Clinical Impression: sinus tachycardia                Critical Care time:  none               Labs Ordered and Resulted from Time of ED Arrival Up to the Time of Departure from the ED - No data to display         Assessments & Plan (with Medical Decision Making)   Mrs. Corona is a 62 year old female with a history of DM Type II, anxiety, hyperlipidemia, chronic rhinitis, right oophorectomy (2008) and DVT on xarelto who presents in the Emergency Department for evaluation of pelvic pain, fever and dog bite. The patient is noted to be febrile with a temp of 38.3 and tachycardic up to 124bpm. She does have fairly significant erythema of the left thigh and fluctuance.  On bedside ultrasound, it appears " she has developed an abscess at the site. The abscess was drained and she was started on both vancomycin and Zosyn for antibiotic coverage. She has no leukocytosis and lactate is within normal limits.  Her tetanus immunization is UTD (2016).  In addition, she reports worsening pelvic pain. A pelvic ultrasound was obtained which shows the left ovary and mass does not appear to be coming from the ovary or have signs of torsion. Unclear the etiology of the large pelvic mass but given her worsening pain, difficulty tolerating PO intake due to significant abdominal pain nausea will pursue further inpatient work-up. Given the need for her to continue antibiotic therapy, will plan to admit to medicine for continued IV antibiotics as well as gyn/onc versus surgery consultation for her large pelvic mass. Additionally, I do note that her hemoglobin has continued to trend down to 8.6. She has not had any bloody stools. She does have a history of anemia in the past and this has been slowly downtrending.  At this point, she does not have indication for transfusion and does not have signs of active bleeding, however will require further workup and close monitoring.    This part of the document was transcribed by Ariel Jhaveri, Medical Scribe.      I have reviewed the nursing notes.    I have reviewed the findings, diagnosis, plan and need for follow up with the patient.    New Prescriptions    No medications on file       Final diagnoses:   Cellulitis and abscess of leg   Pelvic mass     I, Ariel Jhaveri, am serving as a trained medical scribe to document services personally performed by Thelma Lopez MD, based on the provider's statements to me.      I, Thelma Lopez MD, was physically present and have reviewed and verified the accuracy of this note documented by Ariel Jhaveri.     7/2/2017   [facility], EMERGENCY DEPARTMENT     Thelma Lopez MD  07/03/17 0103       Thelma Lopez MD  07/03/17 0104

## 2017-07-03 NOTE — PHARMACY-VANCOMYCIN DOSING SERVICE
Pharmacy Vancomycin Initial Note  Date of Service July 3, 2017  Patient's  1955  62 year old, female    Indication: Skin and Soft Tissue Infection    Current estimated CrCl = Estimated Creatinine Clearance: 74.8 mL/min (based on Cr of 0.84).    Creatinine for last 3 days  2017:  6:41 PM Creatinine 0.82 mg/dL  2017: 10:55 PM Creatinine 0.84 mg/dL    Recent Vancomycin Level(s) for last 3 days  No results found for requested labs within last 72 hours.      Vancomycin IV Administrations (past 72 hours)                   vancomycin (VANCOCIN) 1,500 mg in NaCl 0.9 % 250 mL intermittent infusion (mg) 1,500 mg New Bag 17 2315                Nephrotoxins and other renal medications (Future)    Start     Dose/Rate Route Frequency Ordered Stop    17 1100  vancomycin (VANCOCIN) 1,500 mg in NaCl 0.9 % 250 mL intermittent infusion      1,500 mg Intravenous EVERY 12 HOURS 17 0351      17 0800  lisinopril (PRINIVIL/ZESTRIL) tablet 10 mg      10 mg Oral DAILY 17 0325      17 0730  piperacillin-tazobactam (ZOSYN) 3.375 g vial to attach to  mL bag      3.375 g  over 1 Hours Intravenous EVERY 6 HOURS 17 0325            Contrast Orders - past 72 hours     None                Plan:  1.  Start vancomycin 1500 mg IV q12h.   2.  Goal Trough Level: 10-15 mg/L   3.  Pharmacy will check trough levels as appropriate in 1-3 Days.    4. Serum creatinine levels will be ordered daily for the first week of therapy and at least twice weekly for subsequent weeks.    5. [place] method utilized to dose vancomycin therapy: Method 2    Morelia Orozco, Pharm.D.

## 2017-07-03 NOTE — PLAN OF CARE
Problem: Goal Outcome Summary  Goal: Goal Outcome Summary  Outcome: No Change  Temp to 99.9,slightly tachycardic,other vss for pt.Voiding without difficulty,states she has passed flatus x1,no stool.Hypo bowel sounds.Abdomen is slightly firm,distended.Pelvic exam done by gyn onc.Has been NPO this shift,now changed to regular diet.BG WNL,no sliding scale required.[facility] nurse did left leg dressing change,nugauze packed into 1 of the wounds,covered with mepilex.Area surrounding wound is reddened.Ambulating with assist of 1.Has intermittently been teary,at 1st stated it was anxiety but more recently has stated it  Is her history of depression.States pain is fairly well controlled with dilaudiud o.5 mg every 4- 6 hours.Lung sounds clear but diminished.Has bilateral lower extremity edema,left >right.Both lower extremities are warm to touch.Asking for uninteruppted rest time as she hasn't slept for a couple of days.Declining assistance to wash up,shower.

## 2017-07-03 NOTE — TELEPHONE ENCOUNTER
Pharmacist informed she is in the hospital and will need to wait to see what they discharge her on.

## 2017-07-03 NOTE — H&P
"                                                                                                  **Please see addendum at bottom of note**    [facility]  Inpatient History and Physical    Hafsa Corona MRN# [id number]   Age: 62 year old YOB: 1955     7/3/2017 12:58 AM    Home clinic: [facility]  Primary care provider: Morelia Ayon          Chief Concern:   Worsening pelvic and abdominal pain       History is obtained from the patient          History of Present Illness (Resident / Clinician):   Hafsa Corona is a 62 year old female with history of DM-2, anxiety, HLD, chronic rhinitis, right oophorectomy (2008), DVT (diagnosed 06/05/17) on Xarelto who presented to the ED for evaluation of pelvic pain.    Hafsa reports that over the last month she has had increasing sensation of \"bloating and gas\" in her abdomen. She never really experienced any pain. She thought that her abdominal distension was just gas and started taking some digestive aids. But 2 days ago, she experienced severe pain in her lower abdomen. She went to the ED and was found to have a large mass. She was discharged home with instructions to follow up with gyn-onc outpatient. Due to worsening pain, decreased appetite, 2 episodes of bladder incontinence, she returned to the ED. She also endorses pressure and urge to defecate but has not had a \"good bm\". Feels constipated. Some anal pain. Denies any blood in stool. No nausea/vomiting. Reports having trouble breathing because of the distension. Also reports having a slight cough, has had fever of about 100.3 F.     Over the last year, patient has grown progressively more fatigued. She has had several emotional struggles- including family, financial crises. She also reports struggles with mental health.     She has been pregnant twice but had miscarriages both times. Has been off and on birth control pills. No vaginal bleeding since menopause. Has not had any " abnormal pap smears till date.     In addition, she also is currently being managed for a dog bite that occurred 5 days ago. She was discharged from the ED on 07/01 with a course of Augmentin but due to her ongoing symptoms, she has not been able to fill the script. She presented to the ED today with a large red lump that was incised and drained in the ED. She was febrile and tachycardic and hence was started on antibiotics while in the ED.     Old records were reviewed, and any additions to pertinent history are noted above.           Past Medical History:     Past Medical History:   Diagnosis Date     Anxiety state, unspecified     [id number]     Attention deficit disorder without mention of hyperactivity      Chronic rhinitis      Depressive disorder, not elsewhere classified      Panic disorder without agoraphobia      Pure hypercholesterolemia     Lipitor     Tachycardia, unspecified     9/1999-2/2004     Type II or unspecified type diabetes mellitus without mention of complication, not stated as uncontrolled     diagnosed 2004             Past Surgical History:     Past Surgical History:   Procedure Laterality Date     SALPINGO OOPHORECTOMY,R/L/KAYY  2008    Salpingo Oophorectomy, RT     SURGICAL HISTORY OF -       facial surgery d/t fall in 1/2002     SURGICAL HISTORY OF -       1985 removal of breast cyst     SURGICAL HISTORY OF -   2008    endometrial ablation             Social History:     Social History   Substance Use Topics     Smoking status: Never Smoker     Smokeless tobacco: Never Used     Alcohol use Yes      Comment: rarely             Family History:     Family History   Problem Relation Age of Onset     C.A.D. Father      DIABETES Father      Hypertension Father      CEREBROVASCULAR DISEASE Father      Psychotic Disorder Father      Pancreatic Cancer Father      C.A.D. Mother      Hypertension Mother      Breast Cancer Mother      Psychotic Disorder Mother      Colon Cancer Mother      CANCER  Mother      Bone cancer     Prostate Problems Brother      cleared      Psychotic Disorder Sister      x2     Neurologic Disorder Sister      Psychotic Disorder Brother      x2     Depression Brother      major depressive disorder and OCD     Anxiety Disorder Brother      MENTAL ILLNESS Brother      Psychotic Disorder Maternal Grandmother      ?     Psychotic Disorder Maternal Grandfather      ?     Psychotic Disorder Paternal Grandmother      Schizophernia     Psychotic Disorder Paternal Grandfather      ?     Respiratory Paternal Grandfather       of emphysema and smoking     Psychotic Disorder Sister      Other - See Comments Sister      small kidney stone      Cancer - colorectal No family hx of      Family history reviewed            Immunizations:     Immunization History   Administered Date(s) Administered     Influenza (IIV3) 2006, 10/11/2007, 2008, 2009, 01/10/2013     Influenza Vaccine IM 3yrs+ 4 Valent IIV4 2013, 2016, 2016     Influenza Vaccine, 3 YRS +, IM (QUADRIVALENT W/PRESERVATIVES) 2014     Pneumococcal 23 valent 2013     TDAP Vaccine (Adacel) 2016             Allergies:   Sulfa drugs and Wasps [hornets]          Medications:     No current facility-administered medications on file prior to encounter.   Current Outpatient Prescriptions on File Prior to Encounter:  rivaroxaban ANTICOAGULANT (XARELTO) 20 MG TABS tablet Take 1 tablet (20 mg) by mouth daily (with dinner)   Digestive Enzymes (DIGESTIVE ENZYME PO) Take by mouth as needed    CALCIUM-MAGNESIUM-VITAMIN D PO Take 2 tablets by mouth daily   Probiotic Product (PRO-BIOTIC BLEND PO) Take 1 capsule by mouth daily Enzymatic Therapy-probiotic pearls   atorvastatin (LIPITOR) 80 MG tablet Take 1 tablet (80 mg) by mouth daily   lisinopril (PRINIVIL/ZESTRIL) 10 MG tablet Take 1 tablet (10 mg) by mouth daily   metFORMIN (GLUCOPHAGE-XR) 500 MG 24 hr tablet Take two tabs by mouth once daily with  evening meal.   escitalopram (LEXAPRO) 20 MG tablet Take 1 tablet (20 mg) by mouth daily   VYVANSE 50 MG capsule Reported on 4/11/2017   fluticasone (FLONASE) 50 MCG/ACT nasal spray Spray 2 sprays into both nostrils daily   Cholecalciferol (VITAMIN D) 1000 UNIT capsule Take 2,000 Units by mouth daily    MULTIVITAMIN OR Daily.   amoxicillin-clavulanate (AUGMENTIN) 875-125 MG per tablet Take 1 tablet by mouth 2 times daily for 10 days   traMADol (ULTRAM) 50 MG tablet Take 1 tablet (50 mg) by mouth every 6 hours as needed for pain maximum 4 tablet(s) per day   order for DME Compression stockings 20-30 mm Hg. To be wear when directed by Hematology team and while awake for the first two years post clot.   order for DME Equipment being ordered: blood pressure monitor   EPINEPHrine (EPIPEN) 0.3 MG/0.3ML injection Inject 0.3 mLs (0.3 mg) into the muscle once as needed for anaphylaxis   albuterol (ALBUTEROL) 108 (90 BASE) MCG/ACT inhaler Inhale 2 puffs into the lungs 4 times daily as needed for shortness of breath / dyspnea   UNKNOWN MED DOSAGE vitamin B 50            Review of Systems:   Review of Systems   Constitutional: Positive for appetite change, fatigue and fever.   HENT: Negative for congestion, rhinorrhea and sore throat.    Eyes: Negative for visual disturbance.   Respiratory: Positive for cough and shortness of breath.    Cardiovascular: Positive for chest pain (occasional chest pain with exertion, has had cardiac workup for CAD in the past which was negative) and palpitations (tachycardia).   Gastrointestinal: Positive for abdominal pain and constipation. Negative for nausea and vomiting.   Genitourinary: Positive for pelvic pain. Negative for dysuria, frequency and hematuria.   Skin: Positive for wound (left thigh).   Neurological: Negative for dizziness, syncope, light-headedness and headaches.               Physical Exam:   Vitals were reviewed  Temp: 100.9  F (38.3  C) Temp src: Oral BP: 124/83 Pulse: 124    Resp: 16 SpO2: 93 % O2 Device: None (Room air)    Physical Exam   Constitutional: She is oriented to person, place, and time. She appears well-developed and well-nourished. No distress.   HENT:   Head: Normocephalic and atraumatic.   Right Ear: External ear normal.   Left Ear: External ear normal.   Mouth/Throat: Oropharynx is clear and moist. No oropharyngeal exudate.   Eyes: Conjunctivae and EOM are normal. No scleral icterus.   Neck: Normal range of motion.   Cardiovascular: Normal heart sounds.  Tachycardia present.    Pulmonary/Chest: Effort normal and breath sounds normal. No respiratory distress. She has no wheezes. She has no rales.   Abdominal: Soft. She exhibits distension and mass. There is tenderness in the periumbilical area, suprapubic area and left lower quadrant. There is no rebound and no guarding.   Musculoskeletal: Normal range of motion. She exhibits edema (left lower extremity swollen as compared to right).   Lymphadenopathy:     She has no cervical adenopathy.   Neurological: She is alert and oriented to person, place, and time. No cranial nerve deficit.   Psychiatric: Her mood appears anxious. She exhibits a depressed mood. She expresses no suicidal ideation. She expresses no suicidal plans.   Cries easily              Data:     Results for orders placed or performed during the hospital encounter of 07/02/17 (from the past 24 hour(s))   CBC with platelets differential   Result Value Ref Range    WBC 9.3 4.0 - 11.0 10e9/L    RBC Count 3.15 (L) 3.8 - 5.2 10e12/L    Hemoglobin 8.6 (L) 11.7 - 15.7 g/dL    Hematocrit 26.8 (L) 35.0 - 47.0 %    MCV 85 78 - 100 fl    MCH 27.3 26.5 - 33.0 pg    MCHC 32.1 31.5 - 36.5 g/dL    RDW 14.2 10.0 - 15.0 %    Platelet Count 296 150 - 450 10e9/L    Diff Method Automated Method     % Neutrophils 87.0 %    % Lymphocytes 7.9 %    % Monocytes 4.8 %    % Eosinophils 0.1 %    % Basophils 0.1 %    % Immature Granulocytes 0.1 %    Nucleated RBCs 0 0 /100    Absolute  Neutrophil 8.1 1.6 - 8.3 10e9/L    Absolute Lymphocytes 0.7 (L) 0.8 - 5.3 10e9/L    Absolute Monocytes 0.5 0.0 - 1.3 10e9/L    Absolute Eosinophils 0.0 0.0 - 0.7 10e9/L    Absolute Basophils 0.0 0.0 - 0.2 10e9/L    Abs Immature Granulocytes 0.0 0 - 0.4 10e9/L    Absolute Nucleated RBC 0.0    INR   Result Value Ref Range    INR 1.17 (H) 0.86 - 1.14   Comprehensive metabolic panel   Result Value Ref Range    Sodium 141 133 - 144 mmol/L    Potassium 4.1 3.4 - 5.3 mmol/L    Chloride 104 94 - 109 mmol/L    Carbon Dioxide 25 20 - 32 mmol/L    Anion Gap 12 3 - 14 mmol/L    Glucose 129 (H) 70 - 99 mg/dL    Urea Nitrogen 19 7 - 30 mg/dL    Creatinine 0.84 0.52 - 1.04 mg/dL    GFR Estimate 68 >60 mL/min/1.7m2    GFR Estimate If Black 83 >60 mL/min/1.7m2    Calcium 8.4 (L) 8.5 - 10.1 mg/dL    Bilirubin Total 0.7 0.2 - 1.3 mg/dL    Albumin 3.2 (L) 3.4 - 5.0 g/dL    Protein Total 6.6 (L) 6.8 - 8.8 g/dL    Alkaline Phosphatase 92 40 - 150 U/L    ALT 14 0 - 50 U/L    AST 27 0 - 45 U/L   Lactic acid whole blood   Result Value Ref Range    Lactic Acid 0.7 0.7 - 2.1 mmol/L   US Pelvic Complete w Transvaginal & Abd/Pel Duplex Limited    Narrative    US PELVIS COMPLETE WITH TRANSVAGINAL AND DOPPLER LIMITED    7/3/2017  12:18 AM     HISTORY: Pelvic pain. Status post right oophorectomy.    TECHNIQUE: Transvaginal images were performed to better evaluate the  patient's uterus, ovaries and endometrial stripe. Spectral Doppler and  wave form analysis was also performed to evaluate blood flow to the  ovaries.    COMPARISON: CT of the abdomen and pelvis performed 7/1/2017.    FINDINGS: The uterus is measured at 8.4 x 6.2 x 4.1 cm. No fibroids  are evident. Endometrial stripe could not be visualized. The right  ovary is not seen, consistent with history of recent right  oophorectomy. The left ovary is unremarkable. There is a large complex  pelvic mass, difficult to measure due to its large size. The origin of  this mass could not be  identified on this exam and this finding was  better seen on CT. No free pelvic fluid is present. Spectral Doppler  and waveform analysis demonstrate blood flow to the left ovary.      Impression    IMPRESSION:  1. Large complex pelvic mass is difficult to measure due to its large  size and the origin of this mass is not identified on this exam.   2. There is a large amount of slightly complex free fluid in the  pelvis.      All cardiac studies reviewed by me.   All imaging studies reviewed by me.        Assessment and Plan:   Assessment:   Hafsa Corona is a 62 year old female with history of DM-2, anxiety, HLD, chronic rhinitis, right oophorectomy (2008), DVT (diagnosed 06/05/17) on Xarelto who presented with pelvic pain, worsening abdominal distension and found to have a large pelvic mass of unclear etiology and origin. Being admitted for further evaluation of this mass.   Patient Active Problem List   Diagnosis     Attention deficit disorder     PANIC DISORDER      VASOMOTOR RHINITIS     Chronic Maxillary Sinusitis     Tachycardia     Type 2 diabetes mellitus without complication (H)     HYPERLIPIDEMIA LDL GOAL <100     Allergic rhinitis due to other allergen     BMI > 35     Essential hypertension     Lumbago     Major depressive disorder, recurrent episode, mild (H)     Long-term (current) use of anticoagulants [Z79.01]     Deep vein thrombosis (DVT) (H) [I82.409]         Plan:   ##Left lower extremity abscess s/p I & D, possibly complicated with cellulitis  Patient now 5 days out of the dog bite. As per patient, her dog was not uptodate on rabies shot but she has been in touch with animal services and does not believe that her go has rabies. She presented to the ED with fever, tachycardic. Her labs including CBC, CMP and lactic acid were normal except for a normocytic anemia. Due to concerns for cellulitis, patient was started on broad spectrum antibiotics. Plan to continue them for another 24 hrs.  De-escalate pending clinical improvement. Patient up to date on tetanus.      -Vitals Q4H  -Continue IV vancomycin and zosyn (started 07/02)  -Wound care consult  -Leg elevation  -Pain relief with IV dilaudid 0.3-0.5 mg Q4H PRN, tylenol 1000 mg Q8H PRN  -IV Zofran for nausea  -Follow up blood cultures    ##Pelvic pain secondary to the Large mass originating from the right adnexa  CT scan suggestive of large cystic and solid lesion arising from the right ovary/adnexa. The origin of the mass in unclear. But the mass effect of the lesion in addition to the ascites is likely the source of patient's symptoms. She does not have a leukocytosis, her kidney and liver function is normal on admission. Abdomen exam is not suggestive of an acute abdomen- no rebound, guarding, peritonitic signs. Possible that the constipation is due to compression effects of the mass.   -Gyn-onc consult in the AM  -Consider surgery consult as well  -Pain relief as above  -Consider paracentesis for diagnostic purposes and for symptom relief    ##Left lower extremity DVT  Diagnosed in early June 2017. Patient initially on warfarin but then started on xarelto. Due to being in the ED, she has missed her evening dose on 07/02. Discussed medication kinetics with inpatient pharmacist. Recommend giving the dose in morning, then repeating in the evening and then switching to morning thereafter. If a potential surgery is planned, patient will have to be off Xarelto for 48 hrs.   - Will hold Xarelto pending procedure    ##Normocytic anemia  Patient's baseline hemoglobin is around 10. Her hemoglobin has been down trending over the last month. DDx include malignancy (chronic disease) vs nutritional deficiency. No active signs or symptoms of bleeding.  -Iron studies ordered  -Hemolysis labs ordered.   -Peripheral blood smear to look at RBC morphology.     Chronic stable conditions:   ##HTN- Cont PTA lisinopril   ##Type 2 DM, HgbA1c 6 on 04/11/17  -Hold home  metformin while patient in hospital  -Start MSSI correction while inpatient  ## HLD- Cont PTA lipitor  ##Depression- Cont PTA lexapro    Daily cares -   F: NS @ 75 cc/hr  E:wnl  N:NPO  Lines:PIV   Activity:-Up as tolerated  CODE:Full Code  Prophylaxis: On xarelto  PCP communication:  - Morelia Ayon  Dispo: Expected discharge date 2-3 days pending clinical improvement, diagnostic workup.      Discussed with faculty but not examined by faculty.  Yolande Shankar MD    ADDENDUM  Assessment:  Hafsa is a 63 yo F with h/o right oopherectomy (), DVT (diagnosed 2017) on xarelto, DM2, HLD, admitted for evaluation of pelvic mass on CT concerning for malignancy and management of LUE abscess s/p I&D (7/3)    Pertinent OB/GYN in addition to H&P above:  [id number] of 2 elective abortions  Endometrial ablation for menorrhagia 2/2 uterine fibroids ()  Right salpingo-oopherectomy 2/2 endometrioma in   Menopause in  (following endometrial ablation), no h/o HRT    Plan:  ##Pelvic Mass  ##Pelvic Pain, abdominal distension   CT scan concerning for malignancy of gyn origin. Gyn/onc consulted and given appearance on imaging combined with patient's symptoms, recommend surgical management for both treatment and diagnosis. Gyn/onc to follow daily.     - f/u   - currently on OR schedule for 2017 for exploratory laparotomy, bilateral SPO, possible cancer staging including hysterectomy, pelvic/para-aortic lymphadenopathy, omentectomy, peritoneal biopsies. Gyn/onc team will discuss this plan with patient today  -per gyn/onc, will need IVC filter placed prior to surgery. Will plan to have this done Wednesday by IR  -hold Xarelto 24hrs prior to surgery. No need to hold Xarelto prior to IVC filter per IR  -pain control with IV dilaudid 0.3-0.5mg q4h prn, tylenol 1000mg q8h prn  -zofran for nausea  -optimize medically prior to surgery    ##difficulty with deep inspiration, cough  Hafsa reports difficulty  with deep inspiration, likely due to compression on diaphragm by mass preventing full lung expansion.     -CXR to rule out pneumonia  -incentive spirometry     ##Left upper extremity abscess s/p I & D, possibly complicated with cellulitis  Due to dog bite incident on 6/27.  Patient up to date on tetanus. Started on broad-spectrum abx     -Continue IV vancomycin and zosyn (started 07/02) for now. Plan to de-escalate to PO augmentin possibly tomorrow   -Wound care consult  -Leg elevation  -Pain relief with IV dilaudid 0.3-0.5 mg Q4H PRN, tylenol 1000 mg Q8H PRN  -IV Zofran for nausea    ##Left lower extremity DVT  Diagnosed in early June 2017. Patient initially on warfarin but then started on xarelto. May be secondary to venous compression by pelvic mass causing venous stasis vs malignancy vs hypercoagulable state  -continue xarelto for now  -will need to be held 24hr prior to surgery  -no need to hold xarelto prior to IVC filter placement per IR    ##Normocytic anemia  Patient's baseline hemoglobin is around 10. Her hemoglobin has been down trending over the last month. DDx include malignancy (chronic disease) vs nutritional deficiency. No active signs or symptoms of bleeding.  -Iron studies show low iron and iron saturation, normal TIBC and transferrin. Will start PO iron replacement  -Hemolysis labs ordered. Will follow up  -Peripheral blood smear to look at RBC morphology.     Chronic stable conditions:   ##HTN- Cont PTA lisinopril   ##Type 2 DM, HgbA1c 6 on 04/11/17  -Hold home metformin while patient in hospital  -Start MSSI correction while inpatient  ## HLD- Cont PTA lipitor  ##Depression- Cont PTA lexapro    Daily cares -   F: PO  E:wnl  N:regular diet  Lines:PIV   Activity:-Up as tolerated  CODE:Full Code  Prophylaxis: On xarelto  PCP communication:  - Morelia Ayon  Dispo: Expected discharge date pending recovery from surgery, scheduled for Friday 7/7    Patient seen and plan discussed with staff   Darwin Vick MD  Family Medicine PGY2

## 2017-07-03 NOTE — PROGRESS NOTES
[facility] Nurse Inpatient Wound Assessment     Initial Assessment of wound(s) on pt's:   Left lateral leg    Data:     Patient History:      per MD notes): 62 year old female with history of DM-2, anxiety, HLD, chronic rhinitis, right oophorectomy (), DVT (diagnosed 17) on Xarelto who presented to the ED for evaluation of pelvic pain, per CT pelvic mass. Left upper lateral leg dog bite about 1 week ago was opened and drained in ED.     Moisture Management:  dressings    Current Diet / Nutrition:           Active Diet Order      NPO for Medical/Clinical Reasons Except for: Meds, Ice Chips           Quinton Assessment and sub scores:   Quinton Score  Av  Min: 19  Max: 19     Labs:         Recent Labs   Lab Test  17   0934  17   2255   17   1125   ALBUMIN   --   3.2*   < >   --    HGB  8.4*  8.6*   < >   --    RBC  3.18*  3.15*   < >   --    WBC  8.7  9.3   < >   --    PLT  296  296   < >   --    INR   --   1.17*   < >   --    A1C   --    --    --   6.0    < > = values in this interval not displayed.          Wound Assessment (location #1):   Left Upper Lateral Leg  Wound History:  Dog bite, drained in ED this a.m.  With 2 superficial abrasion on either side of primary wound    Wound Base: not visible, 1cm incision, surrounding bruising and induration. Removed bloody dressing, no bleeding with wound cares now.  0.5 to 1 cm scabs on either side of I&D incision    Tunneling:  3 cm    Palpation of the wound bed:  Firm induration more superior and lateral than medial    Slough appearance:  none    Periwound Skin: intact,  bruising    Drainage:  Appears bleeding has resolved. Amount:    Odor: none    Pain:  minimal , with packing removal          Intervention:     Patient's chart evaluated.      Wound(s) was assessed    Wound Care: was done:  Changed dressing, visual inspection    Orders  Written    Supplies  Ordered:  Iodoform packing    Discussed plan of care with  "Patient          Assessment:       Infected left leg wound 2/2 dog bite 1 week ago         Plan:     Nursing to notify the Provider(s) and re-consult the WOC Nurse if wound(s) deteriorate(s).    Plan of care for wound located on Left Lateral Thigh: Change dressing daily, clean with Microklenz, pack wound with 1/4\" Iodoform gauze, cover with Mepilex dressing    WOC Nurse will return: 1 week     Face to face time: 20 minutes     "

## 2017-07-03 NOTE — ED NOTES
Pelvic and abdominal pain. Having a hard time moving. Was her yesterday and did not fill prescriptions.

## 2017-07-03 NOTE — PHARMACY-ADMISSION MEDICATION HISTORY
Admission medication history interview status for the 7/2/2017 admission is complete. See Epic admission navigator for allergy information, pharmacy, prior to admission medications and immunization status.     Medication history interview sources:  Patient, Surescripts, Care Everywhere    Changes made to PTA medication list (reason)  Added: None  Deleted: augmentin- patient has not used since 2016, patient used it for a sinus infection  Changed:     Digestive enzyme- added amount     Multivitamin- added amount and route     Vitamin B complex- added amount, route    vyvanse- added directions     Additional medication history information (including reliability of information, actions taken by pharmacist):    Patient noted she has not started the following medications yet:    vyvanse    Tramadol      Recent antibiotic use:    Patient was on cephalexin at the beginning of June 2017 for a skin infection        Prior to Admission medications    Medication Sig Last Dose Taking? Auth Provider   Multiple Vitamins-Minerals (MULTIVITAMIN ADULT PO) Take 1 tablet by mouth daily 7/2/2017 at unknown Yes Unknown, Entered By History   vitamin B complex with vitamin C (VITAMIN  B COMPLEX) TABS tablet Take 1 tablet by mouth daily Past Week at Unknown time Yes Unknown, Entered By History   rivaroxaban ANTICOAGULANT (XARELTO) 20 MG TABS tablet Take 1 tablet (20 mg) by mouth daily (with dinner) 7/1/2017 at Unknown time Yes William Villarreal MD   Digestive Enzymes (DIGESTIVE ENZYME PO) Take 1 capsule by mouth as needed  Past Week at Unknown time Yes Reported, Patient   CALCIUM-MAGNESIUM-VITAMIN D PO Take 2 tablets by mouth daily 7/2/2017 at Unknown time Yes Reported, Patient   Probiotic Product (PRO-BIOTIC BLEND PO) Take 1 capsule by mouth daily Enzymatic Therapy-probiotic pearls 7/2/2017 at Unknown time Yes Reported, Patient   atorvastatin (LIPITOR) 80 MG tablet Take 1 tablet (80 mg) by mouth daily 7/1/2017 at Unknown time Yes Nany  Morelia HERRERA NP   lisinopril (PRINIVIL/ZESTRIL) 10 MG tablet Take 1 tablet (10 mg) by mouth daily 7/2/2017 at Unknown time Yes Morelia Ayon NP   metFORMIN (GLUCOPHAGE-XR) 500 MG 24 hr tablet Take two tabs by mouth once daily with evening meal. 7/1/2017 at Unknown time Yes Morelia Ayon NP   escitalopram (LEXAPRO) 20 MG tablet Take 1 tablet (20 mg) by mouth daily 7/2/2017 at unknown Yes Morelia Ayon NP   fluticasone (FLONASE) 50 MCG/ACT nasal spray Spray 2 sprays into both nostrils daily Past Week at Unknown time Yes Morelia Ayon NP   Cholecalciferol (VITAMIN D) 1000 UNIT capsule Take 2,000 Units by mouth daily  7/1/2017 at Unknown time Yes Reported, Patient   traMADol (ULTRAM) 50 MG tablet Take 1 tablet (50 mg) by mouth every 6 hours as needed for pain maximum 4 tablet(s) per day hasnt started yet at hasnt started yet  Malia Ruby MD   order for DME Compression stockings 20-30 mm Hg. To be wear when directed by Hematology team and while awake for the first two years post clot.   Morelia Ayon NP   order for DME Equipment being ordered: blood pressure monitor   Morelia Ayon NP   VYVANSE 50 MG capsule Take one capsule daily  hasnt started yet at hasnt started yet  Reported, Patient   EPINEPHrine (EPIPEN) 0.3 MG/0.3ML injection Inject 0.3 mLs (0.3 mg) into the muscle once as needed for anaphylaxis never used at never used  Morelia Ayon NP   albuterol (ALBUTEROL) 108 (90 BASE) MCG/ACT inhaler Inhale 2 puffs into the lungs 4 times daily as needed for shortness of breath / dyspnea More than a month at Unknown time  Morelia Ayon NP         Medication history completed by: Delmi Carter, PharmD Student

## 2017-07-04 LAB
CRP SERPL-MCNC: 110 MG/L (ref 0–8)
ERYTHROCYTE [DISTWIDTH] IN BLOOD BY AUTOMATED COUNT: 14.6 % (ref 10–15)
GLUCOSE BLDC GLUCOMTR-MCNC: 101 MG/DL (ref 70–99)
GLUCOSE BLDC GLUCOMTR-MCNC: 123 MG/DL (ref 70–99)
GLUCOSE BLDC GLUCOMTR-MCNC: 135 MG/DL (ref 70–99)
GLUCOSE BLDC GLUCOMTR-MCNC: 138 MG/DL (ref 70–99)
GLUCOSE BLDC GLUCOMTR-MCNC: 156 MG/DL (ref 70–99)
HCT VFR BLD AUTO: 27 % (ref 35–47)
HGB BLD-MCNC: 8.6 G/DL (ref 11.7–15.7)
INTERPRETATION ECG - MUSE: NORMAL
MCH RBC QN AUTO: 27 PG (ref 26.5–33)
MCHC RBC AUTO-ENTMCNC: 31.9 G/DL (ref 31.5–36.5)
MCV RBC AUTO: 85 FL (ref 78–100)
PLATELET # BLD AUTO: 303 10E9/L (ref 150–450)
RBC # BLD AUTO: 3.19 10E12/L (ref 3.8–5.2)
WBC # BLD AUTO: 8.8 10E9/L (ref 4–11)

## 2017-07-04 PROCEDURE — 25000131 ZZH RX MED GY IP 250 OP 636 PS 637: Performed by: FAMILY MEDICINE

## 2017-07-04 PROCEDURE — 86140 C-REACTIVE PROTEIN: CPT | Performed by: STUDENT IN AN ORGANIZED HEALTH CARE EDUCATION/TRAINING PROGRAM

## 2017-07-04 PROCEDURE — 25000128 H RX IP 250 OP 636: Performed by: FAMILY MEDICINE

## 2017-07-04 PROCEDURE — 25000125 ZZHC RX 250: Performed by: FAMILY MEDICINE

## 2017-07-04 PROCEDURE — 25000132 ZZH RX MED GY IP 250 OP 250 PS 637: Performed by: STUDENT IN AN ORGANIZED HEALTH CARE EDUCATION/TRAINING PROGRAM

## 2017-07-04 PROCEDURE — 12000001 ZZH R&B MED SURG/OB UMMC

## 2017-07-04 PROCEDURE — 00000146 ZZHCL STATISTIC GLUCOSE BY METER IP

## 2017-07-04 PROCEDURE — 36415 COLL VENOUS BLD VENIPUNCTURE: CPT | Performed by: STUDENT IN AN ORGANIZED HEALTH CARE EDUCATION/TRAINING PROGRAM

## 2017-07-04 PROCEDURE — 25000132 ZZH RX MED GY IP 250 OP 250 PS 637: Performed by: FAMILY MEDICINE

## 2017-07-04 PROCEDURE — 85027 COMPLETE CBC AUTOMATED: CPT | Performed by: STUDENT IN AN ORGANIZED HEALTH CARE EDUCATION/TRAINING PROGRAM

## 2017-07-04 PROCEDURE — 40000556 ZZH STATISTIC PERIPHERAL IV START W US GUIDANCE

## 2017-07-04 RX ORDER — OXYCODONE HYDROCHLORIDE 5 MG/1
5-10 TABLET ORAL EVERY 4 HOURS PRN
Status: DISCONTINUED | OUTPATIENT
Start: 2017-07-04 | End: 2017-07-05

## 2017-07-04 RX ORDER — OXYCODONE HYDROCHLORIDE 5 MG/1
5 TABLET ORAL EVERY 4 HOURS PRN
Status: DISCONTINUED | OUTPATIENT
Start: 2017-07-04 | End: 2017-07-04

## 2017-07-04 RX ADMIN — FLUTICASONE PROPIONATE 2 SPRAY: 50 SPRAY, METERED NASAL at 10:34

## 2017-07-04 RX ADMIN — B-COMPLEX W/ C & FOLIC ACID TAB 1 TABLET: TAB at 09:00

## 2017-07-04 RX ADMIN — OXYCODONE HYDROCHLORIDE 5 MG: 5 TABLET ORAL at 10:44

## 2017-07-04 RX ADMIN — ACETAMINOPHEN 1000 MG: 500 TABLET, FILM COATED ORAL at 18:28

## 2017-07-04 RX ADMIN — PIPERACILLIN AND TAZOBACTAM 3.38 G: 3; .375 INJECTION, POWDER, LYOPHILIZED, FOR SOLUTION INTRAVENOUS; PARENTERAL at 08:41

## 2017-07-04 RX ADMIN — MULTIPLE VITAMINS W/ MINERALS TAB 1 TABLET: TAB at 09:02

## 2017-07-04 RX ADMIN — RIVAROXABAN 20 MG: 20 TABLET, FILM COATED ORAL at 19:35

## 2017-07-04 RX ADMIN — ATORVASTATIN CALCIUM 80 MG: 80 TABLET, FILM COATED ORAL at 19:34

## 2017-07-04 RX ADMIN — VITAMIN D, TAB 1000IU (100/BT) 2000 UNITS: 25 TAB at 10:34

## 2017-07-04 RX ADMIN — LISINOPRIL 10 MG: 10 TABLET ORAL at 19:34

## 2017-07-04 RX ADMIN — OXYCODONE HYDROCHLORIDE 5 MG: 5 TABLET ORAL at 19:34

## 2017-07-04 RX ADMIN — FERROUS SULFATE TAB 325 MG (65 MG ELEMENTAL FE) 325 MG: 325 (65 FE) TAB at 09:02

## 2017-07-04 RX ADMIN — ESCITALOPRAM OXALATE 20 MG: 20 TABLET ORAL at 09:00

## 2017-07-04 RX ADMIN — INSULIN ASPART 1 UNITS: 100 INJECTION, SOLUTION INTRAVENOUS; SUBCUTANEOUS at 12:58

## 2017-07-04 RX ADMIN — PIPERACILLIN AND TAZOBACTAM 3.38 G: 3; .375 INJECTION, POWDER, LYOPHILIZED, FOR SOLUTION INTRAVENOUS; PARENTERAL at 02:41

## 2017-07-04 RX ADMIN — ACETAMINOPHEN 1000 MG: 500 TABLET, FILM COATED ORAL at 02:47

## 2017-07-04 RX ADMIN — SENNOSIDES AND DOCUSATE SODIUM 2 TABLET: 8.6; 5 TABLET ORAL at 09:01

## 2017-07-04 RX ADMIN — AMOXICILLIN AND CLAVULANATE POTASSIUM 1 TABLET: 875; 125 TABLET, FILM COATED ORAL at 12:56

## 2017-07-04 RX ADMIN — OXYCODONE HYDROCHLORIDE 5 MG: 5 TABLET ORAL at 15:59

## 2017-07-04 ASSESSMENT — ENCOUNTER SYMPTOMS
COUGH: 0
VOMITING: 0
FEVER: 0
NAUSEA: 0
DIAPHORESIS: 0
SHORTNESS OF BREATH: 1
DIARRHEA: 0
ABDOMINAL PAIN: 1
CONSTIPATION: 1
CHILLS: 0

## 2017-07-04 ASSESSMENT — ACTIVITIES OF DAILY LIVING (ADL)
FALL_HISTORY_WITHIN_LAST_SIX_MONTHS: NO
COGNITION: 0 - NO COGNITION ISSUES REPORTED
RETIRED_EATING: 0-->INDEPENDENT
DRESS: 0-->INDEPENDENT
RETIRED_COMMUNICATION: 0-->UNDERSTANDS/COMMUNICATES WITHOUT DIFFICULTY
SWALLOWING: 0-->SWALLOWS FOODS/LIQUIDS WITHOUT DIFFICULTY
TOILETING: 0-->INDEPENDENT
BATHING: 0-->INDEPENDENT
TRANSFERRING: 0-->INDEPENDENT
AMBULATION: 0-->INDEPENDENT

## 2017-07-04 ASSESSMENT — PAIN DESCRIPTION - DESCRIPTORS
DESCRIPTORS: SORE

## 2017-07-04 NOTE — PLAN OF CARE
Problem: Goal Outcome Summary  Goal: Goal Outcome Summary  Outcome: No Change  VSS for pt.Up in room with minimal assist,is steady on feet.Leg wound dressing changed,wound unchanged,pink at edges,serosanguenous drainage.Pt teary frequently thorough out the day.This am was insistent she needed to leave the hospital for a job interview,that she was feeling much better and could manage at home and then be readmitted for surgery on Fri.Spoke at length with pt and  re: need for IVC filter placement as well as pain control,BLE edema,inability (at that time) to pass stool.After much discussion,pt is now understanding the implications/severity of her illness.Has spoken with family and friends,states she is feeling a bit better in understanding the implications hr illness.

## 2017-07-04 NOTE — PLAN OF CARE
Problem: Goal Outcome Summary  Goal: Goal Outcome Summary  Outcome: Therapy, progress toward functional goals is gradual  Admitted today for severe abd pain and urinary incontinence. A&Ox4, VSS with HR in 100's. On 2 LPM NC. Pain is controlled with IV dilaudid. IVM running at 75/hr. Denies nausea. Up independent, voiding in the bathroom, passing gas, No BM. Ambulated in the halls x1. LS clear, BS active. DVT in LLE. Wound in left thigh WOCN packed and dressed, drainage present, dressing intact. Plan is to have surgery on 7/7 with an IVC filter placed prior. Pt. Wants to discharge to home for a job interview on 7/5. Angela's team will discuss with her in the team in the morning.

## 2017-07-04 NOTE — PLAN OF CARE
Problem: Goal Outcome Summary  Goal: Goal Outcome Summary  Outcome: Therapy, progress toward functional goals as expected  VSS on 2L. Abdominal pain controlled with tylenol. L thigh wound dressing intact with minimal drainage. Up ad galileo and having adequate UO. +Gas, no BM this shift. Tolerating regular diet. IV with TKO between abx. Continue with POC.

## 2017-07-04 NOTE — PROGRESS NOTES
[facility] - Inpatient daily progress note    Date of admission: 7/2/2017  Date of service: 7/4/2017.         Assessment and Plan:   Assessment:   Hafsa is a 62 year old female with h/o right salpingo-oopherectomy (2007), DVT (diagnosed 6/5/2017) on xarelto, DM2, HLD, admitted for evaluation of pelvic mass on CT concerning for gyn malignancy, on OR schedule with gyn/onc on 7/7/2017 for both diagnosis and treatment   Patient Active Problem List   Diagnosis     Attention deficit disorder     PANIC DISORDER      VASOMOTOR RHINITIS     Chronic Maxillary Sinusitis     Tachycardia     Type 2 diabetes mellitus without complication (H)     HYPERLIPIDEMIA LDL GOAL <100     Allergic rhinitis due to other allergen     BMI > 35     Essential hypertension     Lumbago     Major depressive disorder, recurrent episode, mild (H)     Long-term (current) use of anticoagulants [Z79.01]     Deep vein thrombosis (DVT) (H) [I82.409]     Pelvic mass in female      Plan:   ##Pelvic Mass on CT, concerning for malignancy  ##Pelvic Pain, abdominal distension    elevated. Gyn/onc consulted and given appearance on imaging combined with patient's symptoms, recommend surgical management for both treatment and diagnosis. On OR schedule for Friday 7/7/2017     -Friday 7/7/2017: exploratory laparotomy, bilateral SPO, possible cancer staging including hysterectomy, pelvic/para-aortic lymphadenopathy, omentectomy, peritoneal biopsies. Gyn/onc team will discuss this plan with patient today  -per gyn/onc, will need IVC filter placed prior to surgery. IR consulted, discussed over phone and do not anticipate will be done on 7/5 due to current schedule. Anticipate to be scheduled on 7/6.  -hold Xarelto 24hrs prior to surgery on 7/7/2017. No need to hold Xarelto prior to IVC filter per IR  -pain control with PO oxycodone 5mg q4h prn, tylenol 1000mg q8h prn  -zofran for nausea  -optimize medically prior to  surgery     ##tachypnea  ##hypoxia  Desat to 88% on room air, tachypneic to 24, now on 2L NC. Hafsa reports difficulty with deep inspiration, likely due to compression on diaphragm by mass preventing full lung expansion. CXR showed small bilateral pleural effusion and low lung volume. PE unlikely and also already on anticoagulation. No pleuritic chest pain and not in any apparent respiratory distress.  -incentive spirometry   -continuous pulse ox  -supplemental O2 as needed     ##Left upper extremity abscess s/p I & D, possibly complicated with cellulitis  Due to dog bite incident on 6/27.  Patient up to date on tetanus. Started on broad-spectrum abx      -discontinue IV Vancomycin and Zosyn (7/3-7/4). Transition to PO augmentin (7/4--)  -Wound care consult  -Leg elevation  -Pain relief with PO oxycodone 5mg q4h prn, tylenol 1000 mg Q8H PRN     ##Left lower extremity DVT  Diagnosed in early June 2017. Patient initially on warfarin but then started on xarelto. May be secondary to venous compression by pelvic mass causing venous stasis vs malignancy vs hypercoagulable state  -continue xarelto for now  -will need to be held 24hr prior to surgery on 7/7  -no need to hold xarelto prior to IVC filter placement per IR     ##Normocytic anemia  Patient's baseline hemoglobin is around 10. Her hemoglobin has been down trending over the last month. DDx include malignancy (chronic disease) vs nutritional deficiency. No active signs or symptoms of bleeding.  -Iron studies show low iron and iron saturation, normal TIBC and transferrin. Hold on starting iron until infection improves. Will then consider IV iron to replete quicker and also prevent adverse affect of constipation from PO iron given that patient is currently constipated from pelvic mass effect  -Hemolysis labs ordered: normal retic count, LDH elevated at 332, haptoglobin pending  -Peripheral blood smear to look at RBC morphology.      Chronic stable conditions:   ##HTN-  Cont PTA lisinopril   ##Type 2 DM, HgbA1c 6 on 04/11/17  -Hold home metformin while patient in hospital  -Start MSSI correction while inpatient  ## HLD- Cont PTA lipitor  ##Depression- Cont PTA lexapro     Fluids:PO  Electrolytes:not checked today  Nutrition:regular diet  Lines:PIV  Activity: -Up as tolerated  CODE: Full Code  Prophylaxis: on xarelto   PCP communication:  - Morelia Ayon  Dispo: will remain inpatient until surgery on Friday 7/7/2017, following this will be discharged pending post-op recovery.             Interval History:   No acute events overnight. Expresses concern regarding a job interview tomorrow that she does not want to miss as she has had recent tough financial issues. She states she will be unable to re-schedule. After discussing that we do not recommend discharge home as she is not medically fit for discharge, and so would be signing out AMA if she chose to leave, she decided to stay in hospital until her surgery on Friday 7/7. Offered to call employer to discuss recommended stay in hospital and re-scheduling interview.     Hafsa is currently requiring supplemental O2 for hypoxia on room air. She remains in pain from pelvic mass. CRP is elevated.             Review of Systems:   Review of Systems   Constitutional: Negative for chills, diaphoresis and fever.   Respiratory: Positive for shortness of breath (with deep inspiration). Negative for cough.    Cardiovascular: Negative for chest pain and leg swelling.   Gastrointestinal: Positive for abdominal pain and constipation. Negative for diarrhea, nausea and vomiting.            Physical Exam (Resident / Clinician):     Vitals were reviewed  Patient Vitals for the past 8 hrs:   BP Temp Temp src Pulse Resp SpO2   07/04/17 1445 108/64 97.6  F (36.4  C) Oral 95 20 92 %   07/04/17 0737 116/65 97.2  F (36.2  C) Oral 82 20 92 %       Physical Exam   Constitutional: She appears well-developed and well-nourished. No distress.   Cardiovascular:  Normal rate, regular rhythm and normal heart sounds.  Exam reveals no gallop and no friction rub.    No murmur heard.  Pulmonary/Chest: Effort normal. No respiratory distress. She has no wheezes. She has no rales.   Abdominal: She exhibits distension and mass (palpable mass). There is tenderness (generalized tenderness over area of mass). There is no guarding.   Skin: She is not diaphoretic.   Left lateral thigh abscess s/p I&D, wound site clean, iodoform packing in place, gauze is slightly tinged with blood. Non tender to palpation. Surrounding erythema present   Psychiatric: She has a normal mood and affect. Her behavior is normal. Judgment and thought content normal.     Intake/Output Summary (Last 24 hours) at 07/04/17 1501  Last data filed at 07/04/17 1400   Gross per 24 hour   Intake              500 ml   Output              675 ml   Net             -175 ml           Data:   ROUTINE LABS (Last four results)  CMP  Recent Labs  Lab 07/03/17  0934 07/02/17  2255 07/01/17  1841    141 141   POTASSIUM 3.9 4.1 4.0   CHLORIDE 106 104 105   CO2 26 25 23   ANIONGAP 8 12 13   * 129* 118*   BUN 17 19 20   CR 0.88 0.84 0.82   GFRESTIMATED 65 68 70   GFRESTBLACK 78 83 85   COLE 8.5 8.4* 9.3   PROTTOTAL  --  6.6* 7.4   ALBUMIN  --  3.2* 3.6   BILITOTAL  --  0.7 0.4   ALKPHOS  --  92 103   AST  --  27 21   ALT  --  14 17     CBC  Recent Labs  Lab 07/04/17  1345 07/03/17  1433 07/03/17  0934 07/02/17  2255   WBC 8.8 8.9 8.7 9.3   RBC 3.19* 3.07* 3.18* 3.15*   HGB 8.6* 8.2* 8.4* 8.6*   HCT 27.0* 26.2* 27.0* 26.8*   MCV 85 85 85 85   MCH 27.0 26.7 26.4* 27.3   MCHC 31.9 31.3* 31.1* 32.1   RDW 14.6 14.6 14.6 14.2    303 296 296     INR  Recent Labs  Lab 07/02/17  2255   INR 1.17*     CRP  Recent Labs  Lab 07/04/17  1345   .0*         Blood culture:  Invalid input(s): BC   Urine culture:  No results for input(s): URC in the last 168 hours.  All cultures:    Recent Labs  Lab 07/02/17  2302 07/02/17  2256    CULT No growth after 1 day No growth after 1 day           Medications:     Current Facility-Administered Medications   Medication     oxyCODONE (ROXICODONE) IR tablet 5 mg     amoxicillin-clavulanate (AUGMENTIN) 875-125 MG per tablet 1 tablet     albuterol (PROAIR HFA/PROVENTIL HFA/VENTOLIN HFA) Inhaler 2 puff     atorvastatin (LIPITOR) tablet 80 mg     cholecalciferol (vitamin D) tablet 2,000 Units     escitalopram (LEXAPRO) tablet 20 mg     fluticasone (FLONASE) 50 MCG/ACT spray 2 spray     lisinopril (PRINIVIL/ZESTRIL) tablet 10 mg     naloxone (NARCAN) injection 0.1-0.4 mg     Patient is already receiving anticoagulation with heparin, enoxaparin (LOVENOX), warfarin (COUMADIN)  or other anticoagulant medication     senna-docusate (SENOKOT-S;PERICOLACE) 8.6-50 MG per tablet 1-2 tablet     ondansetron (ZOFRAN-ODT) ODT tab 4 mg    Or     ondansetron (ZOFRAN) injection 4 mg     glucose 40 % gel 15-30 g    Or     dextrose 50 % injection 25-50 mL    Or     glucagon injection 1 mg     insulin aspart (NovoLOG) inj (RAPID ACTING)     insulin aspart (NovoLOG) inj (RAPID ACTING)     acetaminophen (TYLENOL) tablet 1,000 mg     rivaroxaban ANTICOAGULANT (XARELTO) tablet 20 mg     multivitamin, therapeutic with minerals (THERA-VIT-M) tablet 1 tablet     vitamin B complex with vitamin C (STRESS TAB) tablet 1 tablet       Caring Physician: Daniela Vick MD  [facility], [place]  Pager Contact: see Physician sticky note

## 2017-07-05 LAB
BLD PROD TYP BPU: NORMAL
BLD PROD TYP BPU: NORMAL
BLD UNIT ID BPU: 0
BLD UNIT ID BPU: 0
BLOOD PRODUCT CODE: NORMAL
BLOOD PRODUCT CODE: NORMAL
BPU ID: NORMAL
BPU ID: NORMAL
COPATH REPORT: NORMAL
CRP SERPL-MCNC: 84 MG/L (ref 0–8)
ERYTHROCYTE [DISTWIDTH] IN BLOOD BY AUTOMATED COUNT: 14.8 % (ref 10–15)
GLUCOSE BLDC GLUCOMTR-MCNC: 101 MG/DL (ref 70–99)
GLUCOSE BLDC GLUCOMTR-MCNC: 109 MG/DL (ref 70–99)
GLUCOSE BLDC GLUCOMTR-MCNC: 110 MG/DL (ref 70–99)
GLUCOSE BLDC GLUCOMTR-MCNC: 146 MG/DL (ref 70–99)
HAPTOGLOB SERPL-MCNC: 267 MG/DL (ref 35–175)
HCT VFR BLD AUTO: 24.5 % (ref 35–47)
HGB BLD-MCNC: 7.8 G/DL (ref 11.7–15.7)
MCH RBC QN AUTO: 27.3 PG (ref 26.5–33)
MCHC RBC AUTO-ENTMCNC: 31.8 G/DL (ref 31.5–36.5)
MCV RBC AUTO: 86 FL (ref 78–100)
PLATELET # BLD AUTO: 289 10E9/L (ref 150–450)
RBC # BLD AUTO: 2.86 10E12/L (ref 3.8–5.2)
TRANSFUSION STATUS PATIENT QL: NORMAL
WBC # BLD AUTO: 6.9 10E9/L (ref 4–11)

## 2017-07-05 PROCEDURE — 25000132 ZZH RX MED GY IP 250 OP 250 PS 637: Performed by: FAMILY MEDICINE

## 2017-07-05 PROCEDURE — 86901 BLOOD TYPING SEROLOGIC RH(D): CPT | Performed by: FAMILY MEDICINE

## 2017-07-05 PROCEDURE — 86850 RBC ANTIBODY SCREEN: CPT | Performed by: FAMILY MEDICINE

## 2017-07-05 PROCEDURE — 00000146 ZZHCL STATISTIC GLUCOSE BY METER IP

## 2017-07-05 PROCEDURE — 25000132 ZZH RX MED GY IP 250 OP 250 PS 637: Performed by: STUDENT IN AN ORGANIZED HEALTH CARE EDUCATION/TRAINING PROGRAM

## 2017-07-05 PROCEDURE — P9016 RBC LEUKOCYTES REDUCED: HCPCS | Performed by: FAMILY MEDICINE

## 2017-07-05 PROCEDURE — 86900 BLOOD TYPING SEROLOGIC ABO: CPT | Performed by: FAMILY MEDICINE

## 2017-07-05 PROCEDURE — 86140 C-REACTIVE PROTEIN: CPT | Performed by: STUDENT IN AN ORGANIZED HEALTH CARE EDUCATION/TRAINING PROGRAM

## 2017-07-05 PROCEDURE — 12000001 ZZH R&B MED SURG/OB UMMC

## 2017-07-05 PROCEDURE — 36415 COLL VENOUS BLD VENIPUNCTURE: CPT | Performed by: STUDENT IN AN ORGANIZED HEALTH CARE EDUCATION/TRAINING PROGRAM

## 2017-07-05 PROCEDURE — 85027 COMPLETE CBC AUTOMATED: CPT | Performed by: STUDENT IN AN ORGANIZED HEALTH CARE EDUCATION/TRAINING PROGRAM

## 2017-07-05 PROCEDURE — 25000125 ZZHC RX 250: Performed by: FAMILY MEDICINE

## 2017-07-05 PROCEDURE — 86923 COMPATIBILITY TEST ELECTRIC: CPT | Performed by: FAMILY MEDICINE

## 2017-07-05 RX ORDER — HYDROMORPHONE HYDROCHLORIDE 2 MG/1
2-4 TABLET ORAL EVERY 4 HOURS PRN
Status: DISCONTINUED | OUTPATIENT
Start: 2017-07-05 | End: 2017-07-08

## 2017-07-05 RX ADMIN — ATORVASTATIN CALCIUM 80 MG: 80 TABLET, FILM COATED ORAL at 20:24

## 2017-07-05 RX ADMIN — AMOXICILLIN AND CLAVULANATE POTASSIUM 1 TABLET: 875; 125 TABLET, FILM COATED ORAL at 08:53

## 2017-07-05 RX ADMIN — ACETAMINOPHEN 1000 MG: 500 TABLET, FILM COATED ORAL at 08:45

## 2017-07-05 RX ADMIN — B-COMPLEX W/ C & FOLIC ACID TAB 1 TABLET: TAB at 08:53

## 2017-07-05 RX ADMIN — SENNOSIDES AND DOCUSATE SODIUM 2 TABLET: 8.6; 5 TABLET ORAL at 20:22

## 2017-07-05 RX ADMIN — SENNOSIDES AND DOCUSATE SODIUM 2 TABLET: 8.6; 5 TABLET ORAL at 08:44

## 2017-07-05 RX ADMIN — HYDROMORPHONE HYDROCHLORIDE 2 MG: 2 TABLET ORAL at 22:52

## 2017-07-05 RX ADMIN — AMOXICILLIN AND CLAVULANATE POTASSIUM 1 TABLET: 875; 125 TABLET, FILM COATED ORAL at 01:43

## 2017-07-05 RX ADMIN — MULTIPLE VITAMINS W/ MINERALS TAB 1 TABLET: TAB at 08:53

## 2017-07-05 RX ADMIN — AMOXICILLIN AND CLAVULANATE POTASSIUM 1 TABLET: 875; 125 TABLET, FILM COATED ORAL at 20:22

## 2017-07-05 RX ADMIN — ESCITALOPRAM OXALATE 20 MG: 20 TABLET ORAL at 08:45

## 2017-07-05 RX ADMIN — VITAMIN D, TAB 1000IU (100/BT) 2000 UNITS: 25 TAB at 08:55

## 2017-07-05 RX ADMIN — RIVAROXABAN 20 MG: 20 TABLET, FILM COATED ORAL at 17:26

## 2017-07-05 RX ADMIN — OXYCODONE HYDROCHLORIDE 10 MG: 5 TABLET ORAL at 08:43

## 2017-07-05 RX ADMIN — HYDROMORPHONE HYDROCHLORIDE 2 MG: 2 TABLET ORAL at 16:42

## 2017-07-05 RX ADMIN — OXYCODONE HYDROCHLORIDE 10 MG: 5 TABLET ORAL at 02:08

## 2017-07-05 RX ADMIN — FLUTICASONE PROPIONATE 2 SPRAY: 50 SPRAY, METERED NASAL at 08:43

## 2017-07-05 RX ADMIN — LISINOPRIL 10 MG: 10 TABLET ORAL at 20:22

## 2017-07-05 ASSESSMENT — ENCOUNTER SYMPTOMS
VOMITING: 0
NAUSEA: 0
CHILLS: 0
CONSTIPATION: 1
ABDOMINAL PAIN: 1
COUGH: 0
FEVER: 0
DIARRHEA: 0

## 2017-07-05 ASSESSMENT — PAIN DESCRIPTION - DESCRIPTORS
DESCRIPTORS: RADIATING;SORE
DESCRIPTORS: ACHING;SORE;RADIATING
DESCRIPTORS: DULL
DESCRIPTORS: DULL

## 2017-07-05 NOTE — TELEPHONE ENCOUNTER
Please see previous notes from 7/1 and 7/2-pt is in the hospital-no longer on Warfin and is needed surgery to remove a mass in her abdomen, poss ovarian CX.  Allyson ROMERO  [place]

## 2017-07-05 NOTE — PLAN OF CARE
Problem: Goal Outcome Summary  Goal: Goal Outcome Summary  Outcome: Therapy, progress towards functional goals is fair  Admitted on 7/2/2017 with severe abd. Pain. A&Ox4, VSS on 2 LPM NC, Sats 85% when resting. Pain is controlled with PO oxycodone. Up independent, voiding in the bathroom, adequate UO, BM today, passing gas, BS active, LS clear, SOB at times. Wound in left leg changed per orders on day shift. BLE edema, 2+ in left, 1+ in right. Known DVT in left leg. Pt. Wallet was checked into security. Plan is to stay until surgery on 7/7/2017 for abd. Mass. IVC filter will be placed prior, likely Wed. Or Thurs. Dr. Vick will call prospective employer tomorrow regarding Job interview.

## 2017-07-05 NOTE — PROGRESS NOTES
"Social Work: Assessment with Discharge Plan    Patient Name:  Hafsa Corona  :  1955  Age:  62 year old  MRN:  [id number]  Risk/Complexity Score:  Filed Complexity Screen Score: 5  Completed assessment with:  Pt, medical team (rounds), medical chart    Presenting Information   Reason for Referral:  Under stress/ not coping secondary to Depression, Anxiety, Panix disorder  Date of Intake:  2017  Referral Source:  Physician  Decision Maker:  Pt  Alternate Decision Maker:  Pt's sister (Christine: h: [phone]; c: [phone])  Health Care Directive:  Unable to discuss  Living Situation:  Apartment  Previous Functional Status:  Independent  Patient and family understanding of hospitalization:  Pt demonstrated understanding of hospitalization. Pt presented as contemplative and reflected on process of understanding the information being relayed by the medical team.   Cultural/Language/Spiritual Considerations: Hafsa identifies with Zoroastrian but mentioned that she has been influenced by Lutheranism, Catholicism, and Christian because of her relationships throughout life. Pt discussed her appreciation of the on-call  who saw her last night. Pt requested to see regular , as well.  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   Adjustment to Illness:  Pt discussed process of managing hospitalization, physical pain and the little information she has about her Dx. Pt said that she has a \"fighting spirit\" and she believes she is coming through the shock. Pt discussed Hx of Depression and said she has had \"spikes in depression\" over the past few months where she has been \"passively suicidal\"; wondering \"why don't I just die?\" Pt denied suicidal thoughts at the moment or any plan around suicide. Pt expressed guilt and shame she's feeling around the suicidal ideation now that she is facing a cancer Dx. Pt also discussed shame around not recognizing signs of the " "illness prior to hospitalization.     Physical Health  Reason for Admission:    1. Cellulitis and abscess of leg    2. Pelvic mass    3. Abscess of left leg    4. Cellulitis of left leg    5. Mass of pelvis      Services Needed/Recommended:  TCU vs home care.     Mental Health/Chemical Dependency  Diagnosis:  Anxiety, ADD, Depression, Panic Disorder  Support/Services in Place:  Pt said she used to see a therapist, but had to discontinue d/t financial reasons. Pt stated that she takes Lexipro and has a very good psychiatric NP (Angelica Estrella).   Services Needed/Recommended:  Pt requested Health Psychology services.     Support System  Significant relationship at present time:  Pt stated that she has a SO (Phil), but that he is \"part-time\" in relation to his availability to support her.   Family of origin is available for support:  Pt's siblings (two sisters and two brothers) live in [facility]. Pt stated that her sister Christine (h: [phone]; c: [phone]) and Sue will be getting a hold of  and granted  permission to speak with them about pt's care plan and d/c.   Other support available:  Pt named a number of friends in the area who are very supportive. Pt stated that her friends Thelma would be available as a care taker after d/c.   Gaps in support system:  Pt discussed difficulty in relationship with siblings prior to hospitalization in siblings' failing to recognize pt's suffering prior to current pending Dx.   Patient is caregiver to:  Pt mentioned having 2 dogs.     Provider Information   Primary Care Physician:  Morelia Ayon   [phone]   Clinic:  [address]*      :  None identified.     Financial   Income Source:  Pt reports being employed   Financial Concerns:  Pt stated that she's had recent financial concerns and that her siblings are helping her financially.   Insurance:    Payor/Plan Subscriber Name Rel Member # Group # "   [employer] -  * MANA BRISENO  [id number]      [address]       Discharge Plan   Patient and family discharge goal:  Home with home care vs TCU  Provided education on discharge plan:  YES  Patient agreeable to discharge plan:  YES  A list of Medicare Certified Facilities was provided to the patient and/or family to encourage patient choice. Patient's choices for facility are:  Pt requested list of TCU's near [facility].   Will NH provide Skilled rehabilitation or complex medical:  YES  General information regarding anticipated insurance coverage and possible out of pocket cost was discussed. Patient and patient's family are aware patient may incur the cost of transportation to the facility, pending insurance payment: YES  Barriers to discharge:  Medical stability    Discharge Recommendations   Anticipated Disposition:  Home w services vs TCU  Transportation Needs: TBD  Name of Transportation Company and Phone:  TBD    Additional comments   SW met with pt. Pt was very open and engaging with SW in discussing her Hx of MH issues and adjustment to current illness. Pt expressed concern for how she might handle a Cancer Dx and requested Health Psychology and ongoing  services. Pt described a rich a large support community and said her sisters will also be contacting SW.     Tana Sanders, MARY, LGSW  7C Surgical Oncology Unit  Phone: [phone]  Pager: [phone]

## 2017-07-05 NOTE — PROGRESS NOTES
Care Coordinator Progress Note     Admission Date/Time:  7/2/2017  Attending MD:  Darwin Antonio MD     Data  Chart reviewed, discussed with interdisciplinary team.   Patient was admitted for:    Cellulitis and abscess of leg  Pelvic mass  Abscess of left leg  Cellulitis of left leg  Mass of pelvis.    Assessment  Per care team, possible surgery on Friday for pelvic mass.  IR unable to place an IVC filter, md team to discuss with surgery team to ensure surgery is still possible for Friday.  If surgery to be delayed then pt may dc home and come in as an out pt.  Pt has not known RNCC needs known at this time.  On lovenox currently and Xarelto,  No INR clinic is needed- message to md to clarify no need need for coumadin or lovenox at dc.  Per team this will depend on bx results after surgery.  Higher likelihood of dc with lovenox.  Message to out pt pharmacy liaison, renato,  to assess for coverage and cost of monthly lovenox.  Per pharmacy pt has coverage and cost to her would be $15.00 for bid dosing.         Plan  Anticipated Discharge Date:  Pending surgical plan and post op course.     Simi Rutledge, RNCC, BSN    [facility]    Medicine Group  [address] [place] [id number]    [email or web address]  [email or web address]    Office: [phone] Pager: [phone]

## 2017-07-05 NOTE — PLAN OF CARE
"Problem: Goal Outcome Summary  Goal: Goal Outcome Summary  Outcome: Improving  /68 (BP Location: Right arm)  Pulse 88  Temp 98.8  F (37.1  C) (Oral)  Resp 18  Ht 1.613 m (5' 3.5\")  Wt 97.7 kg (215 lb 6.4 oz)  SpO2 95%  Breastfeeding? No  BMI 37.56 kg/m2     A&O x4, able to make needs known. Pt states she feels more positive about surgery than overnight. Up ad galileo, ambulated in hallway x2. Abdominal pain controlled with prn oxycodone x1, pt stated she felt the oxycodone made her loopy and drowsy, orders obtained for prn oral dilaudid with improvement in orientation and alertness along with better pain control. Denies nausea or emesis, tolerating regular diet. No insulin needed for BG, most recent BG elevated d/t pt eating scone prior to taking sugar, no insulin given. Passing gas, denies BM today pt feels constipated but feels evening senna might be enough to help have a BM. Voiding small amounts foster urine, MDs aware of low urine volumes, discussed with team at bedside, continue to monitor. First unit PRBC completed without reaction. Plan of care discussed with pt, continue plan of care.       "

## 2017-07-05 NOTE — PLAN OF CARE
Problem: Goal Outcome Summary  Goal: Goal Outcome Summary  Outcome: No Change  Alert and orientated. All vitals stable.  Left leg remains slightly swollen with varicose veins. Pain managed with PRN oxycodone with desired effect. Up ad galileo to bathroom. At 0300 hrs pt was in her room  visibly upset, crying uncontrollably  and expressing fears about her potential  diagnosis. Pt requested the service of . On call  notified and attended to pt in timely manner.  visit was successful. Pt expressed that the  consultation was beneficial and calmed her down. Pt was able to sleep calmly thereafter. Plan is for pt to have surgery July 7/ 2017 for abdominal mass. IVC filter for DVT to be placed this week. Will continue to monitor

## 2017-07-05 NOTE — PROGRESS NOTES
[facility] - Inpatient daily progress note    Date of admission: 7/2/2017  Date of service: 7/5/2017.         Assessment and Plan:   Assessment:   Hafsa is a 62 year old female with h/o right salpingo-oopherectomy (2007), DVT (diagnosed 6/5/2017) on xarelto, DM2, HLD, admitted for evaluation of pelvic mass on CT concerning for gyn malignancy, on OR schedule with gyn/onc on 7/7/2017 for both diagnosis and treatment   Patient Active Problem List   Diagnosis     Attention deficit disorder     PANIC DISORDER      VASOMOTOR RHINITIS     Chronic Maxillary Sinusitis     Tachycardia     Type 2 diabetes mellitus without complication (H)     HYPERLIPIDEMIA LDL GOAL <100     Allergic rhinitis due to other allergen     BMI > 35     Essential hypertension     Lumbago     Major depressive disorder, recurrent episode, mild (H)     Long-term (current) use of anticoagulants [Z79.01]     Deep vein thrombosis (DVT) (H) [I82.409]     Pelvic mass in female      Plan:   ##Pelvic Mass on CT, concerning for malignancy  ##Pelvic Pain, abdominal distension    elevated at 1187. Gyn/onc consulted and given appearance on imaging combined with patient's symptoms, recommend surgical management for both treatment and diagnosis. On OR schedule for Friday 7/7/2017     -Friday 7/7/2017: exploratory laparotomy, bilateral SPO, possible cancer staging including hysterectomy, pelvic/para-aortic lymphadenopathy, omentectomy, peritoneal biopsies. Gyn/onc team will discuss this plan with patient today  -IR unable to place IVC filter prior to surgery due to mass compressing infrarenal IVC and IVC is too large to place a filter suprarenal. Gyn-onc aware and will discuss if would like to try to place IVC filter after surgery.  -hold Xarelto 24hrs prior to surgery on 7/7/2017  -pain control with PO oxycodone 5-10mg q4h prn, tylenol 1000mg q8h prn  -zofran for nausea  -optimize medically prior to surgery    ##Normocytic  anemia  Patient's baseline hemoglobin is around 10. Her hemoglobin has been down trending over the last month. DDx include malignancy (chronic disease) vs nutritional deficiency. No active signs or symptoms of bleeding. Anemia work up revealing for low iron and low iron saturation. Hb decreased to 7.8 today, not actively bleeding.  -in setting of surgery on Friday 7/7, will optimize with blood transfusion. Informed consent obtained to transfuse 2 units PRBC   -repeat Hb AM      ##tachypnea-resolved  ##hypoxia-resolved  Now on room air with stable O2 sats.Hafsa reports difficulty with deep inspiration, likely due to compression on diaphragm by mass preventing full lung expansion. CXR showed small bilateral pleural effusion and low lung volume. PE unlikely and also already on anticoagulation. No pleuritic chest pain and not in any apparent respiratory distress.  -incentive spirometry   -continuous pulse ox  -supplemental O2 as needed     ##Left upper extremity abscess s/p I & D, possibly complicated with cellulitis  Due to dog bite incident on 6/27.  Patient up to date on tetanus. Started on broad-spectrum abx  -discontinued IV Vancomycin and Zosyn (7/3-7/4). Transitioned to PO augmentin (7/4--)  -Wound care consult  -Leg elevation  -Pain relief with PO oxycodone 5-10mg q4h prn, tylenol 1000 mg Q8H PRN     ##Left lower extremity DVT  Diagnosed in early June 2017. Patient initially on warfarin but then started on xarelto. May be secondary to venous compression by pelvic mass causing venous stasis vs malignancy vs hypercoagulable state  -continue xarelto for now  -will need to be held 24hr prior to surgery on 7/7     Chronic stable conditions:   ##HTN- Cont PTA lisinopril   ##Type 2 DM, HgbA1c 6 on 04/11/17  -Hold home metformin while patient in hospital  -Start MSSI correction while inpatient  ## HLD- Cont PTA lipitor  ##Depression- Cont PTA lexapro     Fluids: PO  Electrolytes: not checked today  Nutrition: regular  diet  Lines: PIV  Activity: -Up as tolerated  CODE: Full Code  Prophylaxis: on xarelto   PCP communication:  - Morelia Ayon  Dispo: will remain inpatient until surgery on Friday 7/7/2017, following this will be discharged pending post-op recovery.             Interval History:   Became anxious overnight regarding hospitalization. Was seen by  which was helpful. No complaints this morning. Has many questions regarding surgery on Friday, the IVC filter and blood transfusion. At end of plan discussion, Hafsa demonstrated understanding, had the plan written out and agreed. No other complaints or issues.             Review of Systems:   Review of Systems   Constitutional: Negative for chills and fever.   Respiratory: Negative for cough. Shortness of breath: with deep inspiration.    Cardiovascular: Negative for chest pain.   Gastrointestinal: Positive for abdominal pain and constipation. Negative for diarrhea, nausea and vomiting.            Physical Exam (Resident / Clinician):     Vitals were reviewed  Patient Vitals for the past 8 hrs:   BP Temp Temp src Pulse SpO2   07/05/17 0718 180/70 97.4  F (36.3  C) Oral 85 91 %       Physical Exam   Constitutional: She appears well-developed and well-nourished. No distress.   Cardiovascular: Normal rate, regular rhythm and normal heart sounds.  Exam reveals no gallop and no friction rub.    No murmur heard.  Pulmonary/Chest: Effort normal. No respiratory distress. She has no wheezes. She has no rales.   Abdominal: She exhibits distension and mass (palpable mass). There is tenderness (generalized tenderness over area of mass). There is no guarding.   Skin: She is not diaphoretic.   Left lateral thigh abscess s/p I&D, wound site clean, iodoform packing in place, dressing is slightly tinged with blood. Non tender to palpation. Surrounding erythema improving. Swelling improving   Psychiatric: She has a normal mood and affect. Her behavior is normal. Judgment and  thought content normal.     Intake/Output Summary (Last 24 hours) at 07/05/17 1012  Last data filed at 07/05/17 0845   Gross per 24 hour   Intake              520 ml   Output              450 ml   Net               70 ml           Data:   ROUTINE LABS (Last four results)  CMP    Recent Labs  Lab 07/03/17  0934 07/02/17  2255 07/01/17  1841    141 141   POTASSIUM 3.9 4.1 4.0   CHLORIDE 106 104 105   CO2 26 25 23   ANIONGAP 8 12 13   * 129* 118*   BUN 17 19 20   CR 0.88 0.84 0.82   GFRESTIMATED 65 68 70   GFRESTBLACK 78 83 85   COLE 8.5 8.4* 9.3   PROTTOTAL  --  6.6* 7.4   ALBUMIN  --  3.2* 3.6   BILITOTAL  --  0.7 0.4   ALKPHOS  --  92 103   AST  --  27 21   ALT  --  14 17     CBC    Recent Labs  Lab 07/05/17  0910 07/04/17  1345 07/03/17  1433 07/03/17  0934   WBC 6.9 8.8 8.9 8.7   RBC 2.86* 3.19* 3.07* 3.18*   HGB 7.8* 8.6* 8.2* 8.4*   HCT 24.5* 27.0* 26.2* 27.0*   MCV 86 85 85 85   MCH 27.3 27.0 26.7 26.4*   MCHC 31.8 31.9 31.3* 31.1*   RDW 14.8 14.6 14.6 14.6    303 303 296     INR    Recent Labs  Lab 07/02/17  2255   INR 1.17*     CRP    Recent Labs  Lab 07/05/17  0714 07/04/17  1345   CRP 84.0* 110.0*         Blood culture:  Invalid input(s): BC   Urine culture:  No results for input(s): URC in the last 168 hours.  All cultures:    Recent Labs  Lab 07/02/17  2302 07/02/17  2253   CULT No growth after 2 days No growth after 2 days           Medications:     Current Facility-Administered Medications   Medication     amoxicillin-clavulanate (AUGMENTIN) 875-125 MG per tablet 1 tablet     oxyCODONE (ROXICODONE) IR tablet 5-10 mg     albuterol (PROAIR HFA/PROVENTIL HFA/VENTOLIN HFA) Inhaler 2 puff     atorvastatin (LIPITOR) tablet 80 mg     cholecalciferol (vitamin D) tablet 2,000 Units     escitalopram (LEXAPRO) tablet 20 mg     fluticasone (FLONASE) 50 MCG/ACT spray 2 spray     lisinopril (PRINIVIL/ZESTRIL) tablet 10 mg     naloxone (NARCAN) injection 0.1-0.4 mg     Patient is already receiving  anticoagulation with heparin, enoxaparin (LOVENOX), warfarin (COUMADIN)  or other anticoagulant medication     senna-docusate (SENOKOT-S;PERICOLACE) 8.6-50 MG per tablet 1-2 tablet     ondansetron (ZOFRAN-ODT) ODT tab 4 mg    Or     ondansetron (ZOFRAN) injection 4 mg     glucose 40 % gel 15-30 g    Or     dextrose 50 % injection 25-50 mL    Or     glucagon injection 1 mg     insulin aspart (NovoLOG) inj (RAPID ACTING)     insulin aspart (NovoLOG) inj (RAPID ACTING)     acetaminophen (TYLENOL) tablet 1,000 mg     rivaroxaban ANTICOAGULANT (XARELTO) tablet 20 mg     multivitamin, therapeutic with minerals (THERA-VIT-M) tablet 1 tablet     vitamin B complex with vitamin C (STRESS TAB) tablet 1 tablet       Caring Physician: Daniela Vick MD  [facility] [place]  Pager Contact: see Physician sticky note

## 2017-07-05 NOTE — PROGRESS NOTES
"SPIRITUAL HEALTH SERVICES  SPIRITUAL ASSESSMENT Progress Note  [facility]C   ON-CALL VISIT    REFERRAL SOURCE: Consult Order: Page from Nurse    Hafsa was experiencing some anxiety when woke up for medication and vitals check.  She is not liking the uncertainty of her illness - tests are being run by medical doctors to find out if she has cancer - because of this uncertainty and not recognizing symptoms sooner Hafsa stated, \"I should have known something was wrong.\"  Hafsa also expressed her battles with anxiety and depression throughout life, stating, \"These last five years I've had to endure a lot of suffering.\"    We shared a conversation of Hafsa's life and anxiety.  Hafsa used a Lao phrase to explain her world-view as \"a broken world\" that we are invited to heal.  Hafsa expressed struggles of job loss, fighting with siblings, and people just being mean.  Hafsa expressed frustration, she stated that she has a job interview today that she wouldn't be able to attend because of her stay in the hospital.  Hafsa stated that she is in one of the best hospitals with a great culture of care and diversity of peoples, but was frustrated with hospital/institutional systems \"the bean counting of it all.\"    Hafsa identifies with Mandaen but mentioned that she has been influenced by Lutheranism, Catholicism, and Protestant because of her relationships throughout life.  Hafsa requested a prayer, so we prayed together.  Hafsa would appreciate continued  support.    PLAN: Will notify unit  of visit.    Jg Ngo   Intern  Pager [phone]    "

## 2017-07-05 NOTE — CONSULTS
IR consulted for IVC filter placement prior to surgical resection of large pelvic mass. Image concerning for gynecologic malignancy. Patient is on the surgery schedule for Friday, 7/7. Patient does have recent diagnosis of LLE DVT and is currently being anticoagulated with xarelto. US from 6/1 showed clot in proximal left peroneal and posterior tibial veins.     CT scan from 7/1/2017 reviewed with Dr. Bro from IR. Due to large mass compressing infrarenal IVC, unable to safely place filter prior to surgery. Also, IVC is too large to place a filter suprarenal.     IR can place filter post-surgically if team feels they will be unable to safely anticoagulate for a long period of time, but we would require a CT scan prior to proceeding (after the mass is surgically resected). These recommendations were relayed Dr. Vick from [facility]. She was going to share this information with gyn onc.    Diana Ortega DNP, APRN  Interventional Radiology   Phone: [phone]  Pager: [phone]

## 2017-07-06 ENCOUNTER — ANESTHESIA EVENT (OUTPATIENT)
Dept: SURGERY | Facility: CLINIC | Age: 62
DRG: 737 | End: 2017-07-06
Payer: COMMERCIAL

## 2017-07-06 LAB
CRP SERPL-MCNC: 49 MG/L (ref 0–8)
ERYTHROCYTE [DISTWIDTH] IN BLOOD BY AUTOMATED COUNT: 14.6 % (ref 10–15)
GLUCOSE BLDC GLUCOMTR-MCNC: 104 MG/DL (ref 70–99)
GLUCOSE BLDC GLUCOMTR-MCNC: 107 MG/DL (ref 70–99)
GLUCOSE BLDC GLUCOMTR-MCNC: 109 MG/DL (ref 70–99)
GLUCOSE BLDC GLUCOMTR-MCNC: 120 MG/DL (ref 70–99)
GLUCOSE BLDC GLUCOMTR-MCNC: 128 MG/DL (ref 70–99)
HCT VFR BLD AUTO: 28.4 % (ref 35–47)
HGB BLD-MCNC: 9.1 G/DL (ref 11.7–15.7)
MCH RBC QN AUTO: 27.2 PG (ref 26.5–33)
MCHC RBC AUTO-ENTMCNC: 32 G/DL (ref 31.5–36.5)
MCV RBC AUTO: 85 FL (ref 78–100)
PLATELET # BLD AUTO: 295 10E9/L (ref 150–450)
RBC # BLD AUTO: 3.34 10E12/L (ref 3.8–5.2)
WBC # BLD AUTO: 5.2 10E9/L (ref 4–11)

## 2017-07-06 PROCEDURE — 00000146 ZZHCL STATISTIC GLUCOSE BY METER IP

## 2017-07-06 PROCEDURE — 85027 COMPLETE CBC AUTOMATED: CPT | Performed by: STUDENT IN AN ORGANIZED HEALTH CARE EDUCATION/TRAINING PROGRAM

## 2017-07-06 PROCEDURE — 25000132 ZZH RX MED GY IP 250 OP 250 PS 637: Performed by: FAMILY MEDICINE

## 2017-07-06 PROCEDURE — 36415 COLL VENOUS BLD VENIPUNCTURE: CPT | Performed by: STUDENT IN AN ORGANIZED HEALTH CARE EDUCATION/TRAINING PROGRAM

## 2017-07-06 PROCEDURE — 86140 C-REACTIVE PROTEIN: CPT | Performed by: STUDENT IN AN ORGANIZED HEALTH CARE EDUCATION/TRAINING PROGRAM

## 2017-07-06 PROCEDURE — 12000001 ZZH R&B MED SURG/OB UMMC

## 2017-07-06 PROCEDURE — 25000132 ZZH RX MED GY IP 250 OP 250 PS 637: Performed by: STUDENT IN AN ORGANIZED HEALTH CARE EDUCATION/TRAINING PROGRAM

## 2017-07-06 RX ORDER — POLYETHYLENE GLYCOL 3350 17 G/17G
17 POWDER, FOR SOLUTION ORAL 2 TIMES DAILY
Status: DISCONTINUED | OUTPATIENT
Start: 2017-07-06 | End: 2017-07-12 | Stop reason: HOSPADM

## 2017-07-06 RX ORDER — AMOXICILLIN 250 MG
2 CAPSULE ORAL 2 TIMES DAILY
Status: DISCONTINUED | OUTPATIENT
Start: 2017-07-06 | End: 2017-07-12 | Stop reason: HOSPADM

## 2017-07-06 RX ADMIN — HYDROMORPHONE HYDROCHLORIDE 2 MG: 2 TABLET ORAL at 02:53

## 2017-07-06 RX ADMIN — HYDROMORPHONE HYDROCHLORIDE 2 MG: 2 TABLET ORAL at 18:28

## 2017-07-06 RX ADMIN — AMOXICILLIN AND CLAVULANATE POTASSIUM 1 TABLET: 875; 125 TABLET, FILM COATED ORAL at 09:16

## 2017-07-06 RX ADMIN — FLUTICASONE PROPIONATE 2 SPRAY: 50 SPRAY, METERED NASAL at 09:17

## 2017-07-06 RX ADMIN — VITAMIN D, TAB 1000IU (100/BT) 2000 UNITS: 25 TAB at 09:16

## 2017-07-06 RX ADMIN — B-COMPLEX W/ C & FOLIC ACID TAB 1 TABLET: TAB at 09:17

## 2017-07-06 RX ADMIN — SENNOSIDES AND DOCUSATE SODIUM 2 TABLET: 8.6; 5 TABLET ORAL at 21:01

## 2017-07-06 RX ADMIN — ATORVASTATIN CALCIUM 80 MG: 80 TABLET, FILM COATED ORAL at 19:01

## 2017-07-06 RX ADMIN — HYDROMORPHONE HYDROCHLORIDE 2 MG: 2 TABLET ORAL at 22:45

## 2017-07-06 RX ADMIN — ESCITALOPRAM OXALATE 20 MG: 20 TABLET ORAL at 09:17

## 2017-07-06 RX ADMIN — HYDROMORPHONE HYDROCHLORIDE 2 MG: 2 TABLET ORAL at 06:59

## 2017-07-06 RX ADMIN — POLYETHYLENE GLYCOL 3350 17 G: 17 POWDER, FOR SOLUTION ORAL at 21:01

## 2017-07-06 RX ADMIN — MULTIPLE VITAMINS W/ MINERALS TAB 1 TABLET: TAB at 09:16

## 2017-07-06 RX ADMIN — HYDROMORPHONE HYDROCHLORIDE 2 MG: 2 TABLET ORAL at 11:28

## 2017-07-06 RX ADMIN — POLYETHYLENE GLYCOL 3350 17 G: 17 POWDER, FOR SOLUTION ORAL at 11:28

## 2017-07-06 RX ADMIN — AMOXICILLIN AND CLAVULANATE POTASSIUM 1 TABLET: 875; 125 TABLET, FILM COATED ORAL at 19:04

## 2017-07-06 RX ADMIN — LISINOPRIL 10 MG: 10 TABLET ORAL at 19:01

## 2017-07-06 RX ADMIN — SENNOSIDES AND DOCUSATE SODIUM 2 TABLET: 8.6; 5 TABLET ORAL at 09:16

## 2017-07-06 ASSESSMENT — PAIN DESCRIPTION - DESCRIPTORS
DESCRIPTORS: DULL
DESCRIPTORS: ACHING;DULL
DESCRIPTORS: ACHING;DULL
DESCRIPTORS: DULL;ACHING

## 2017-07-06 ASSESSMENT — ENCOUNTER SYMPTOMS
VOMITING: 0
COUGH: 0
DIARRHEA: 0
CONSTIPATION: 1
SHORTNESS OF BREATH: 0
NAUSEA: 0
ABDOMINAL PAIN: 1
FEVER: 0

## 2017-07-06 NOTE — PROGRESS NOTES
07/05/17 2129   Vitals   Temp 99.7  F (37.6  C)   Temp src Oral   Pulse 91   /58   BP Location Right arm   Resp 16   Oxygen Therapy   SpO2 92 %   O2 Device None (Room air)   Time blood entered pt's IV

## 2017-07-06 NOTE — PLAN OF CARE
Problem: Goal Outcome Summary  Goal: Goal Outcome Summary  Patient A&O  x4. VSS. Pain controlled with medication. Patient up ad galileo. Received 2 units of blood. Eating meals tolerating well. Good urine output. No BM this shift. Amublated in hallway x1. Intervention for sleep/anxiety discussed. Suggested care: continue to monitor. Surgery scheduled for 7/7.

## 2017-07-06 NOTE — PLAN OF CARE
"Problem: Goal Outcome Summary  Goal: Goal Outcome Summary  Outcome: Improving  /75 (BP Location: Right arm)  Pulse 85  Temp 99.2  F (37.3  C) (Oral)  Resp 18  Ht 1.613 m (5' 3.5\")  Wt 97.7 kg (215 lb 6.4 oz)  SpO2 93%  Breastfeeding? No  BMI 37.56 kg/m2     A&O x4, able to make needs known. Tmax 99.2, AOVSS on RA, pt requires 2 L NC when sleeping. Up ad galileo in room, SBA in hallway. Abdominal pain controlled with prn oral dilaudid x2, see MAR. Denies nausea or emesis, tolerating regular diet. No insulin needed for BG. Passing gas, BM x1, voiding adequate amounts clear yellow urine. Pt wanting to fill out POLST form, forms are in pt room and she will fill them out, MD will sign in AM. Pt will be NPO at 0015 for surgery tomorrow, consent signed and witnessed in chart. Wound to L thigh changed per nursing student observed by writer, dressing now has old dried drainage. L PIV SL, flushed without difficulty. Plan of care discussed with pt, continue plan of care.       "

## 2017-07-06 NOTE — PROGRESS NOTES
I discussed with patient the surgical plan and options depending on frozen pathology, whether it's malignant or benign.  Patient desires exploratory laparotomy, removal of pelvic mass, total abdominal hysterectomy, salpingo-oophorectomy, possible cancer surgery including omentectomy, lymph node dissection and peritoneal biopsies. Risks and benefits were discussed with patient. All questions answered. Surgery planned for 7/7 at 11:15 am.     Naty Pinzon MD  Gynecologic Oncology Fellow      I have personally reviewed the planned procedure with the patient.  I have reviewed Dr. Pinzon's note above,  Agree with planned procedure .    Serena Cole  7/7/2017  12:07 PM

## 2017-07-06 NOTE — PROGRESS NOTES
[facility] - Inpatient daily progress note    Date of admission: 7/2/2017  Date of service: 7/6/2017         Assessment and Plan:   Assessment:   Hafsa is a 62 year old female with h/o right salpingo-oopherectomy (2007), DVT (diagnosed 6/5/2017) on xarelto, DM2, HLD, admitted for evaluation of pelvic mass on CT concerning for gyn malignancy, on OR schedule with gyn/onc on 7/7/2017 for both diagnosis and treatment. Also managing LUE abscess s/p I&D 7/2  Patient Active Problem List   Diagnosis     Attention deficit disorder     PANIC DISORDER      VASOMOTOR RHINITIS     Chronic Maxillary Sinusitis     Tachycardia     Type 2 diabetes mellitus without complication (H)     HYPERLIPIDEMIA LDL GOAL <100     Allergic rhinitis due to other allergen     BMI > 35     Essential hypertension     Lumbago     Major depressive disorder, recurrent episode, mild (H)     Long-term (current) use of anticoagulants [Z79.01]     Deep vein thrombosis (DVT) (H) [I82.409]     Pelvic mass in female      Plan:   ##Pelvic Mass on CT, concerning for malignancy  ##Pelvic Pain, abdominal distension    elevated at 1187. Gyn/onc consulted, recommend surgical management for both treatment and diagnosis. On OR schedule for Friday 7/7/2017     -Friday 7/7/2017: exploratory laparotomy, bilateral SPO, possible cancer staging including hysterectomy, pelvic/para-aortic lymphadenopathy, omentectomy, peritoneal biopsies. Gyn/onc team will discuss this plan with patient today  -IR unable to place IVC filter prior to surgery due to mass compressing infrarenal IVC and IVC is too large to place a filter suprarenal. Gyn-onc aware and will discuss if would like to try to place IVC filter after surgery  -hold Xarelto today, anticoagulation plan post op per gyn/onc. Hb check 24hrs post-operatively before starting any pharmacologic anticoagulation.  -NPO at midnight  -pain control with dilaudid 2-4mg PO q4h prn, tylenol 1000mg q8h  prn  -zofran for nausea  -optimize medically prior to surgery    ##Normocytic anemia  Baseline hemoglobin ~10. Her hemoglobin has been down trending over the last month. DDx include malignancy (chronic disease) vs nutritional deficiency. No active signs or symptoms of bleeding. Anemia work up revealing for low iron and low iron saturation. S/P 2 units PRBC 7/5 in setting of low Hb and optimization prior to OR, with improvement in Hb   -re-check Hb 24hrs post operatively     ##Constipation  Has not yet had a BM while inpatient. Likely due to compression by pelvic mass. Also on PO dilaudid for pain control.   -bowel regimen: Miralax BID and Senokot BID     ##tachypnea-resolved  ##hypoxia-resolved  Alternates between room air and supplemental O2 with stable O2 sats.Hafsa reports difficulty with deep inspiration, likely due to compression on diaphragm by mass preventing full lung expansion. No pleuritic chest pain and not in any apparent respiratory distress.  -continue incentive spirometry   -continuous pulse ox  -supplemental O2 as needed     ##Left upper extremity abscess s/p I&D-improving  Due to dog bite incident on 6/27. S/P I&D 7/2 and then started on broad-spectrum abx. Patient up to date on tetanus. CRP down trending, and remains afebrile.   -continue PO augmentin (7/4--)  -Wound care consult  -daily dressing changes  -Leg elevation   -Pain relief with dilaudid 2-4mg PO q4h prn, tylenol 1000 mg Q8H PRN     ##Left lower extremity DVT  Diagnosed in early June 2017. Patient initially on warfarin but then started on xarelto. May be secondary to venous compression by pelvic mass causing venous stasis vs malignancy vs hypercoagulable state  -hold xarelto today for OR tomorrow.   -Consider lovenox post operatively if Hb stable and in setting of likely malignancy      Chronic stable conditions:   ##HTN- Cont PTA lisinopril   ##Type 2 DM, HgbA1c 6 on 04/11/17  -Hold home metformin while patient in hospital  -Start MSSI  correction while inpatient  ## HLD- Cont PTA lipitor  ##MDD, BLAIR, panic disorder- Cont PTA lexapro. Patient requesting to speak with a mental health professional while inpatient for counseling with history of MDD, anxiety and panic disorder  -psychology consult placed      Fluids: PO  Electrolytes: not checked  Nutrition: regular diet, NPO at midnight  Lines: PIV  Activity: -Up as tolerated  CODE: Full Code  Prophylaxis: on xarelto (to be held today)  PCP communication:  - Morelia Ayon  Dispo: will remain inpatient until surgery on Friday 7/7/2017, following this will be discharged pending post-op recovery.             Interval History:   No acute events overnight. Complains of right groin pain that is intermittent and sharp, usually occurs when standing but not very bothersome or concerning to patient. Otherwise doing well. Pain well controlled. Has been in touch with family and friends. Requesting to speak with mental health professional for counseling. Has been seeing  while inpatient.             Review of Systems:   Review of Systems   Constitutional: Negative for fever.   Respiratory: Negative for cough and shortness of breath.    Cardiovascular: Negative for chest pain.   Gastrointestinal: Positive for abdominal pain and constipation. Negative for diarrhea, nausea and vomiting.          Physical Exam (Resident / Clinician):     Vitals were reviewed  Patient Vitals for the past 8 hrs:   BP Temp Temp src Pulse Heart Rate Resp SpO2   07/06/17 1135 123/70 97.6  F (36.4  C) Oral 83 83 16 95 %       Physical Exam   Constitutional: She appears well-developed and well-nourished. No distress.   Pulmonary/Chest: Effort normal. No respiratory distress. She has no wheezes. She has no rales.   Abdominal: She exhibits distension and mass (palpable mass). There is tenderness (generalized tenderness over area of mass). There is no guarding.   Skin: She is not diaphoretic.   Left lateral thigh abscess s/p I&D,  iodoform packing in place, small amount of blood leaking from incision site, no purulent drainage. Non tender to palpation. Surrounding erythema and swelling improving   Psychiatric: She has a normal mood and affect. Her behavior is normal. Judgment and thought content normal.     Intake/Output Summary (Last 24 hours) at 07/06/17 1424  Last data filed at 07/06/17 1132   Gross per 24 hour   Intake             1560 ml   Output             1740 ml   Net             -180 ml           Data:   ROUTINE LABS (Last four results)  CMP    Recent Labs  Lab 07/03/17  0934 07/02/17  2255 07/01/17  1841    141 141   POTASSIUM 3.9 4.1 4.0   CHLORIDE 106 104 105   CO2 26 25 23   ANIONGAP 8 12 13   * 129* 118*   BUN 17 19 20   CR 0.88 0.84 0.82   GFRESTIMATED 65 68 70   GFRESTBLACK 78 83 85   COLE 8.5 8.4* 9.3   PROTTOTAL  --  6.6* 7.4   ALBUMIN  --  3.2* 3.6   BILITOTAL  --  0.7 0.4   ALKPHOS  --  92 103   AST  --  27 21   ALT  --  14 17     CBC    Recent Labs  Lab 07/06/17  0749 07/05/17  0910 07/04/17  1345 07/03/17  1433   WBC 5.2 6.9 8.8 8.9   RBC 3.34* 2.86* 3.19* 3.07*   HGB 9.1* 7.8* 8.6* 8.2*   HCT 28.4* 24.5* 27.0* 26.2*   MCV 85 86 85 85   MCH 27.2 27.3 27.0 26.7   MCHC 32.0 31.8 31.9 31.3*   RDW 14.6 14.8 14.6 14.6    289 303 303     INR    Recent Labs  Lab 07/02/17  2255   INR 1.17*     CRP    Recent Labs  Lab 07/06/17  0823 07/05/17  0714 07/04/17  1345   CRP 49.0* 84.0* 110.0*         Blood culture:  Invalid input(s): BC   Urine culture:  No results for input(s): URC in the last 168 hours.  All cultures:    Recent Labs  Lab 07/02/17  2302 07/02/17  2253   CULT No growth after 3 days No growth after 3 days           Medications:     Current Facility-Administered Medications   Medication     [Rx hold ] rivaroxaban ANTICOAGULANT (XARELTO) tablet 20 mg     senna-docusate (SENOKOT-S;PERICOLACE) 8.6-50 MG per tablet 2 tablet     polyethylene glycol (MIRALAX/GLYCOLAX) Packet 17 g     HYDROmorphone  (DILAUDID) tablet 2-4 mg     amoxicillin-clavulanate (AUGMENTIN) 875-125 MG per tablet 1 tablet     albuterol (PROAIR HFA/PROVENTIL HFA/VENTOLIN HFA) Inhaler 2 puff     atorvastatin (LIPITOR) tablet 80 mg     cholecalciferol (vitamin D) tablet 2,000 Units     escitalopram (LEXAPRO) tablet 20 mg     fluticasone (FLONASE) 50 MCG/ACT spray 2 spray     lisinopril (PRINIVIL/ZESTRIL) tablet 10 mg     naloxone (NARCAN) injection 0.1-0.4 mg     Patient is already receiving anticoagulation with heparin, enoxaparin (LOVENOX), warfarin (COUMADIN)  or other anticoagulant medication     ondansetron (ZOFRAN-ODT) ODT tab 4 mg    Or     ondansetron (ZOFRAN) injection 4 mg     glucose 40 % gel 15-30 g    Or     dextrose 50 % injection 25-50 mL    Or     glucagon injection 1 mg     insulin aspart (NovoLOG) inj (RAPID ACTING)     insulin aspart (NovoLOG) inj (RAPID ACTING)     acetaminophen (TYLENOL) tablet 1,000 mg     multivitamin, therapeutic with minerals (THERA-VIT-M) tablet 1 tablet     vitamin B complex with vitamin C (STRESS TAB) tablet 1 tablet       Caring Physician: Daniela Vick MD  [facility], [facility]  Pager Contact: see Physician sticky note

## 2017-07-06 NOTE — CONSULTS
"  Health Psychology                  Clinic    Department of Medicine  Alecia Mina, Ph.D., L.P. [phone]                          [facility]  [facility] Casimiro Renae, Ph.D.,  L.P. [phone]                 3rd Floor  [place] Mail Code 742   Darwin Linton, Ph.D., KAUR., L.P. [phone]     [address] Fiorella Han, Ph.D., L.P. [phone]            [place] [address]           Christine Kebede, Ph.D., L.P. [phone]     Inpatient Health Psychology Consultation*    Consult request received, chart reviewed.  Ms Corona reports a longstanding history of depression and anxiety. Panic episodes prominent between 4988-2823, not frequent since then. Reports a \"meltdown\" very early Wednesday morning (0300) that sounds as if it was a panic episode, feeling overwhelmed, found contact with  at that time very helpful.  Has been treated by psychiatrist in community. Does not currently have a psychotherapist and asks about options for ongoing psychological support.  Using resources for emotional and spiritual support well. Very pleased with her care here. Reports that she is currently not feeling scared, plans to spend time this evening with two of her closest friends and talk to some of her siblings.  Taught her a compassion practice that she found very helpful and that I encouraged her to use as she proceeds through the process of events tonight and tomorrow.  Asks re [employer] Directive. I explained that it might be logistically difficult to get that form complete and notarized prior to surgery. Provided her with POLST form that she will complete with help of nursing. Dr Vick from Angela's team informed re this and will come to floor tomorrow morning to sign the form, asks that it be placed in Ms Corona's paper chart.  I do note that psychiatry notes in Care Everywhere indicate a primary " diagnosis of Dysthymic Disorder. However, based on Ms Corona's reports of symptoms over the past months, including frequent suicidal ideation, it seems probable that she currently also meets criteria for an episode of Major Depressive Disorder.   Affect varied, appropriate, congruent with content of conversation.    Diagnostic impression (based on information from patient and from notes in Care Everywhere): Dysthymic Disorder (34.1), Major depressive disorder, single episode, moderate (F32.1), Generalized Anxiety Disorder (F41.1), Attention Deficit Disorder without mention of hyperactivity (F90.0) . History of panic disorder.    Full dictation to follow.        *In accordance with the Rules of the [place] Board of [employer], it is noted that psychological descriptions and scientific procedures underlying psychological evaluations have limitations.  Absolute predictions cannot be made based on information in this report.

## 2017-07-06 NOTE — PROGRESS NOTES
"SPIRITUAL HEALTH SERVICES  SPIRITUAL ASSESSMENT Progress Note  [facility] ([place]) 6C     PRIMARY FOCUS:     Support for coping    REFERRAL SOURCE: On Call, follow up    ILLNESS CIRCUMSTANCES:   Reviewed documentation. Reflective conversation shared with Hafsa which integrated elements of illness and family narratives.     Context of Serious Illness/Symptom(s) - Patient said she came to the hospital with pain that she said \"almost knocked me out\" and is now waiting for surgery tomorrow to remove very large abdominal mass, and waiting to hear if it is ovarian cancer. Patient expressed that she has struggled with depression and anxiety in the past, and that the last few years have included a lot of loss.     Resources for Support - Patient said she has the support of lots of friends, Pentecostalism chaplains from the community who she's calling, and her siblings.    DISTRESS:     Emotional/Spiritual/Existential Distress - Patient expressed feeling like \"something deep is healing\" and that \"every day since I talked to Jg has been getting better.\" Patient discussed being in a very dark place Tuesday night and the immense help that she found in talking to a .    Presybeterian Distress - Patient said that on Tuesday she was thinking \"God, I just can't handle one more thing.\" Patient expressed feeling better each day.    Social/Cultural/Economic Distress - Patient expressed financial struggles.    SPIRITUAL/Yazdanism COPING:     Hinduism/Janay - Patient described herself as Pentecostalism and Ecumenical, said there are lots of people praying for her from lots of different traditions and \"it helps.\"    Spiritual Practice(s) - Patient appreciated sharing a prayer together.    Emotional/Relational/Existential Connections - Support of friends and family. Patient expressed deep gratitude for her entire care team, repeatedly said the care here is excellent and she feels surrounded by it. Patient expressed deep gratitude for  visit " "and said \"it helped me immensely.\" Patient requested that chaplains continue to visit her.    GOALS OF CARE:    Goals of Care - Patient expressed goal of having surgery tomorrow to remove mass and check for cancer.    Meaning/Sense-Making - Patient said \"I feel like something deep is healing here. I have been so tired and how I'm really resting and I just feel so surrounded by care.\"    PLAN: Will refer to unit .    Oanh Diane   Intern  Pager [phone]    "

## 2017-07-06 NOTE — PLAN OF CARE
Problem: Goal Outcome Summary  Goal: Goal Outcome Summary  Outcome: Therapy, progress toward functional goals is gradual  Temp max 99.9, tachycardic. Placed on 2 L O2 overnight d/t desating while falling asleep. Abdomen distended. Passing flatus, no BM. Abdominal pain controlled with 2 mg PO Dilaudid. Pt reporting right anterior, intermittent sharp groin pain; MD notified. Denies nausea. Dog bite to left thigh, mepilex intact with dried drainage. Known DVT in LLE. Pt tolerated additional 1 unit RBCs, received total of 2 units. BG low 100s, no insulin needed. On regular diet. Voiding spontaneously adequate amounts. Up ad galileo in room, SBA in hallways. Surgery scheduled for Friday. Pt slept well between cares.

## 2017-07-06 NOTE — PROVIDER NOTIFICATION
Paged Cesario Shankar at [phone]. Known left DVT. Pt reporting intermittent sharp right anterior groin pain. Has been ongoing since yesterday afternoon, but did not mention until overnight.     MD did not return page.

## 2017-07-07 ENCOUNTER — TRANSFERRED RECORDS (OUTPATIENT)
Dept: HEALTH INFORMATION MANAGEMENT | Facility: CLINIC | Age: 62
End: 2017-07-07

## 2017-07-07 ENCOUNTER — SURGERY (OUTPATIENT)
Age: 62
End: 2017-07-07
Payer: COMMERCIAL

## 2017-07-07 ENCOUNTER — APPOINTMENT (OUTPATIENT)
Dept: GENERAL RADIOLOGY | Facility: CLINIC | Age: 62
DRG: 737 | End: 2017-07-07
Attending: ANESTHESIOLOGY
Payer: COMMERCIAL

## 2017-07-07 ENCOUNTER — ANESTHESIA (OUTPATIENT)
Dept: SURGERY | Facility: CLINIC | Age: 62
DRG: 737 | End: 2017-07-07
Payer: COMMERCIAL

## 2017-07-07 ENCOUNTER — APPOINTMENT (OUTPATIENT)
Dept: GENERAL RADIOLOGY | Facility: CLINIC | Age: 62
DRG: 737 | End: 2017-07-07
Attending: HOSPITALIST
Payer: COMMERCIAL

## 2017-07-07 PROBLEM — Z98.890 S/P LAPAROTOMY: Status: ACTIVE | Noted: 2017-07-07

## 2017-07-07 LAB
ABO + RH BLD: NORMAL
ABO + RH BLD: NORMAL
ANION GAP SERPL CALCULATED.3IONS-SCNC: 4 MMOL/L (ref 3–14)
BASE EXCESS BLDA CALC-SCNC: 1.6 MMOL/L
BASE EXCESS BLDA CALC-SCNC: 3.2 MMOL/L
BLD GP AB SCN SERPL QL: NORMAL
BLD PROD TYP BPU: NORMAL
BLD UNIT ID BPU: 0
BLD UNIT ID BPU: 0
BLOOD BANK CMNT PATIENT-IMP: NORMAL
BLOOD PRODUCT CODE: NORMAL
BLOOD PRODUCT CODE: NORMAL
BPU ID: NORMAL
BPU ID: NORMAL
BUN SERPL-MCNC: 21 MG/DL (ref 7–30)
CA-I BLD-MCNC: 4.9 MG/DL (ref 4.4–5.2)
CA-I BLD-MCNC: 4.9 MG/DL (ref 4.4–5.2)
CALCIUM SERPL-MCNC: 9.1 MG/DL (ref 8.5–10.1)
CHLORIDE SERPL-SCNC: 110 MMOL/L (ref 94–109)
CO2 SERPL-SCNC: 30 MMOL/L (ref 20–32)
CREAT SERPL-MCNC: 1.14 MG/DL (ref 0.52–1.04)
ERYTHROCYTE [DISTWIDTH] IN BLOOD BY AUTOMATED COUNT: 14.7 % (ref 10–15)
GFR SERPL CREATININE-BSD FRML MDRD: 48 ML/MIN/1.7M2
GLUCOSE BLD-MCNC: 115 MG/DL (ref 70–99)
GLUCOSE BLD-MCNC: 163 MG/DL (ref 70–99)
GLUCOSE BLDC GLUCOMTR-MCNC: 100 MG/DL (ref 70–99)
GLUCOSE BLDC GLUCOMTR-MCNC: 109 MG/DL (ref 70–99)
GLUCOSE BLDC GLUCOMTR-MCNC: 128 MG/DL (ref 70–99)
GLUCOSE BLDC GLUCOMTR-MCNC: 152 MG/DL (ref 70–99)
GLUCOSE BLDC GLUCOMTR-MCNC: 170 MG/DL (ref 70–99)
GLUCOSE SERPL-MCNC: 90 MG/DL (ref 70–99)
HCO3 BLD-SCNC: 26 MMOL/L (ref 21–28)
HCO3 BLD-SCNC: 27 MMOL/L (ref 21–28)
HCT VFR BLD AUTO: 29.4 % (ref 35–47)
HGB BLD-MCNC: 8.8 G/DL (ref 11.7–15.7)
HGB BLD-MCNC: 9.4 G/DL (ref 11.7–15.7)
HGB BLD-MCNC: 9.5 G/DL (ref 11.7–15.7)
MCH RBC QN AUTO: 27.4 PG (ref 26.5–33)
MCHC RBC AUTO-ENTMCNC: 32 G/DL (ref 31.5–36.5)
MCV RBC AUTO: 86 FL (ref 78–100)
NUM BPU REQUESTED: 4
O2/TOTAL GAS SETTING VFR VENT: 65 %
O2/TOTAL GAS SETTING VFR VENT: 77 %
PCO2 BLD: 37 MM HG (ref 35–45)
PCO2 BLD: 38 MM HG (ref 35–45)
PH BLD: 7.45 PH (ref 7.35–7.45)
PH BLD: 7.46 PH (ref 7.35–7.45)
PLATELET # BLD AUTO: 291 10E9/L (ref 150–450)
PO2 BLD: 71 MM HG (ref 80–105)
PO2 BLD: 77 MM HG (ref 80–105)
POTASSIUM BLD-SCNC: 3.6 MMOL/L (ref 3.4–5.3)
POTASSIUM BLD-SCNC: 3.7 MMOL/L (ref 3.4–5.3)
POTASSIUM SERPL-SCNC: 3.9 MMOL/L (ref 3.4–5.3)
RBC # BLD AUTO: 3.43 10E12/L (ref 3.8–5.2)
SODIUM BLD-SCNC: 144 MMOL/L (ref 133–144)
SODIUM BLD-SCNC: 144 MMOL/L (ref 133–144)
SODIUM SERPL-SCNC: 144 MMOL/L (ref 133–144)
SPECIMEN EXP DATE BLD: NORMAL
TRANSFUSION STATUS PATIENT QL: NORMAL
WBC # BLD AUTO: 6.6 10E9/L (ref 4–11)

## 2017-07-07 PROCEDURE — 25000128 H RX IP 250 OP 636: Performed by: NURSE ANESTHETIST, CERTIFIED REGISTERED

## 2017-07-07 PROCEDURE — 25000125 ZZHC RX 250: Performed by: NURSE ANESTHETIST, CERTIFIED REGISTERED

## 2017-07-07 PROCEDURE — 25000128 H RX IP 250 OP 636: Performed by: ANESTHESIOLOGY

## 2017-07-07 PROCEDURE — 82330 ASSAY OF CALCIUM: CPT | Performed by: FAMILY MEDICINE

## 2017-07-07 PROCEDURE — 82803 BLOOD GASES ANY COMBINATION: CPT

## 2017-07-07 PROCEDURE — 37000008 ZZH ANESTHESIA TECHNICAL FEE, 1ST 30 MIN: Performed by: OBSTETRICS & GYNECOLOGY

## 2017-07-07 PROCEDURE — 36000064 ZZH SURGERY LEVEL 4 EA 15 ADDTL MIN - UMMC: Performed by: OBSTETRICS & GYNECOLOGY

## 2017-07-07 PROCEDURE — 84132 ASSAY OF SERUM POTASSIUM: CPT | Performed by: FAMILY MEDICINE

## 2017-07-07 PROCEDURE — 25000132 ZZH RX MED GY IP 250 OP 250 PS 637: Performed by: FAMILY MEDICINE

## 2017-07-07 PROCEDURE — 07BC0ZZ EXCISION OF PELVIS LYMPHATIC, OPEN APPROACH: ICD-10-PCS | Performed by: OBSTETRICS & GYNECOLOGY

## 2017-07-07 PROCEDURE — 25000128 H RX IP 250 OP 636: Performed by: FAMILY MEDICINE

## 2017-07-07 PROCEDURE — C9399 UNCLASSIFIED DRUGS OR BIOLOG: HCPCS | Performed by: NURSE ANESTHETIST, CERTIFIED REGISTERED

## 2017-07-07 PROCEDURE — 25000125 ZZHC RX 250: Performed by: ANESTHESIOLOGY

## 2017-07-07 PROCEDURE — 40000170 ZZH STATISTIC PRE-PROCEDURE ASSESSMENT II: Performed by: OBSTETRICS & GYNECOLOGY

## 2017-07-07 PROCEDURE — 07BD0ZZ EXCISION OF AORTIC LYMPHATIC, OPEN APPROACH: ICD-10-PCS | Performed by: OBSTETRICS & GYNECOLOGY

## 2017-07-07 PROCEDURE — 0DBW0ZX EXCISION OF PERITONEUM, OPEN APPROACH, DIAGNOSTIC: ICD-10-PCS | Performed by: OBSTETRICS & GYNECOLOGY

## 2017-07-07 PROCEDURE — 25000566 ZZH SEVOFLURANE, EA 15 MIN: Performed by: OBSTETRICS & GYNECOLOGY

## 2017-07-07 PROCEDURE — 82947 ASSAY GLUCOSE BLOOD QUANT: CPT | Performed by: FAMILY MEDICINE

## 2017-07-07 PROCEDURE — P9016 RBC LEUKOCYTES REDUCED: HCPCS | Performed by: FAMILY MEDICINE

## 2017-07-07 PROCEDURE — 36415 COLL VENOUS BLD VENIPUNCTURE: CPT | Performed by: STUDENT IN AN ORGANIZED HEALTH CARE EDUCATION/TRAINING PROGRAM

## 2017-07-07 PROCEDURE — 40000985 XR ABDOMEN PORT F1 VW

## 2017-07-07 PROCEDURE — 88307 TISSUE EXAM BY PATHOLOGIST: CPT | Performed by: OBSTETRICS & GYNECOLOGY

## 2017-07-07 PROCEDURE — 0UT90ZZ RESECTION OF UTERUS, OPEN APPROACH: ICD-10-PCS | Performed by: OBSTETRICS & GYNECOLOGY

## 2017-07-07 PROCEDURE — 12000008 ZZH R&B INTERMEDIATE UMMC

## 2017-07-07 PROCEDURE — 58951 RESECT OVARIAN MALIGNANCY: CPT | Mod: GC | Performed by: OBSTETRICS & GYNECOLOGY

## 2017-07-07 PROCEDURE — 88331 PATH CONSLTJ SURG 1 BLK 1SPC: CPT | Performed by: OBSTETRICS & GYNECOLOGY

## 2017-07-07 PROCEDURE — 84295 ASSAY OF SERUM SODIUM: CPT

## 2017-07-07 PROCEDURE — 85027 COMPLETE CBC AUTOMATED: CPT | Performed by: STUDENT IN AN ORGANIZED HEALTH CARE EDUCATION/TRAINING PROGRAM

## 2017-07-07 PROCEDURE — 88305 TISSUE EXAM BY PATHOLOGIST: CPT | Performed by: OBSTETRICS & GYNECOLOGY

## 2017-07-07 PROCEDURE — 25000128 H RX IP 250 OP 636: Performed by: STUDENT IN AN ORGANIZED HEALTH CARE EDUCATION/TRAINING PROGRAM

## 2017-07-07 PROCEDURE — 27210794 ZZH OR GENERAL SUPPLY STERILE: Performed by: OBSTETRICS & GYNECOLOGY

## 2017-07-07 PROCEDURE — 71000015 ZZH RECOVERY PHASE 1 LEVEL 2 EA ADDTL HR: Performed by: OBSTETRICS & GYNECOLOGY

## 2017-07-07 PROCEDURE — 0UT60ZZ RESECTION OF LEFT FALLOPIAN TUBE, OPEN APPROACH: ICD-10-PCS | Performed by: OBSTETRICS & GYNECOLOGY

## 2017-07-07 PROCEDURE — 40000985 XR CHEST PORT 1 VW

## 2017-07-07 PROCEDURE — S0020 INJECTION, BUPIVICAINE HYDRO: HCPCS | Performed by: ANESTHESIOLOGY

## 2017-07-07 PROCEDURE — P9041 ALBUMIN (HUMAN),5%, 50ML: HCPCS | Performed by: NURSE ANESTHETIST, CERTIFIED REGISTERED

## 2017-07-07 PROCEDURE — 25000128 H RX IP 250 OP 636: Performed by: HOSPITALIST

## 2017-07-07 PROCEDURE — 00000146 ZZHCL STATISTIC GLUCOSE BY METER IP

## 2017-07-07 PROCEDURE — 80048 BASIC METABOLIC PNL TOTAL CA: CPT | Performed by: STUDENT IN AN ORGANIZED HEALTH CARE EDUCATION/TRAINING PROGRAM

## 2017-07-07 PROCEDURE — 71000014 ZZH RECOVERY PHASE 1 LEVEL 2 FIRST HR: Performed by: OBSTETRICS & GYNECOLOGY

## 2017-07-07 PROCEDURE — 84132 ASSAY OF SERUM POTASSIUM: CPT

## 2017-07-07 PROCEDURE — 82803 BLOOD GASES ANY COMBINATION: CPT | Performed by: FAMILY MEDICINE

## 2017-07-07 PROCEDURE — 82330 ASSAY OF CALCIUM: CPT

## 2017-07-07 PROCEDURE — 88309 TISSUE EXAM BY PATHOLOGIST: CPT | Performed by: OBSTETRICS & GYNECOLOGY

## 2017-07-07 PROCEDURE — 0DBS0ZZ EXCISION OF GREATER OMENTUM, OPEN APPROACH: ICD-10-PCS | Performed by: OBSTETRICS & GYNECOLOGY

## 2017-07-07 PROCEDURE — 84295 ASSAY OF SERUM SODIUM: CPT | Performed by: FAMILY MEDICINE

## 2017-07-07 PROCEDURE — 0UT10ZZ RESECTION OF LEFT OVARY, OPEN APPROACH: ICD-10-PCS | Performed by: OBSTETRICS & GYNECOLOGY

## 2017-07-07 PROCEDURE — 37000009 ZZH ANESTHESIA TECHNICAL FEE, EACH ADDTL 15 MIN: Performed by: OBSTETRICS & GYNECOLOGY

## 2017-07-07 PROCEDURE — 25000132 ZZH RX MED GY IP 250 OP 250 PS 637: Performed by: HOSPITALIST

## 2017-07-07 PROCEDURE — 0UTC0ZZ RESECTION OF CERVIX, OPEN APPROACH: ICD-10-PCS | Performed by: OBSTETRICS & GYNECOLOGY

## 2017-07-07 PROCEDURE — 82947 ASSAY GLUCOSE BLOOD QUANT: CPT

## 2017-07-07 PROCEDURE — 36000062 ZZH SURGERY LEVEL 4 1ST 30 MIN - UMMC: Performed by: OBSTETRICS & GYNECOLOGY

## 2017-07-07 PROCEDURE — 25000132 ZZH RX MED GY IP 250 OP 250 PS 637: Performed by: STUDENT IN AN ORGANIZED HEALTH CARE EDUCATION/TRAINING PROGRAM

## 2017-07-07 RX ORDER — CEFAZOLIN SODIUM 1 G/3ML
1 INJECTION, POWDER, FOR SOLUTION INTRAMUSCULAR; INTRAVENOUS SEE ADMIN INSTRUCTIONS
Status: DISCONTINUED | OUTPATIENT
Start: 2017-07-07 | End: 2017-07-07 | Stop reason: HOSPADM

## 2017-07-07 RX ORDER — ONDANSETRON 4 MG/1
4 TABLET, ORALLY DISINTEGRATING ORAL EVERY 30 MIN PRN
Status: DISCONTINUED | OUTPATIENT
Start: 2017-07-07 | End: 2017-07-07 | Stop reason: HOSPADM

## 2017-07-07 RX ORDER — NALOXONE HYDROCHLORIDE 0.4 MG/ML
.1-.4 INJECTION, SOLUTION INTRAMUSCULAR; INTRAVENOUS; SUBCUTANEOUS
Status: DISCONTINUED | OUTPATIENT
Start: 2017-07-07 | End: 2017-07-07

## 2017-07-07 RX ORDER — PROPOFOL 10 MG/ML
INJECTION, EMULSION INTRAVENOUS PRN
Status: DISCONTINUED | OUTPATIENT
Start: 2017-07-07 | End: 2017-07-07

## 2017-07-07 RX ORDER — FENTANYL CITRATE 50 UG/ML
25-50 INJECTION, SOLUTION INTRAMUSCULAR; INTRAVENOUS
Status: DISCONTINUED | OUTPATIENT
Start: 2017-07-07 | End: 2017-07-07 | Stop reason: HOSPADM

## 2017-07-07 RX ORDER — LIDOCAINE 40 MG/G
CREAM TOPICAL
Status: DISCONTINUED | OUTPATIENT
Start: 2017-07-07 | End: 2017-07-12 | Stop reason: HOSPADM

## 2017-07-07 RX ORDER — CEFAZOLIN SODIUM 2 G/100ML
2 INJECTION, SOLUTION INTRAVENOUS
Status: DISCONTINUED | OUTPATIENT
Start: 2017-07-07 | End: 2017-07-08

## 2017-07-07 RX ORDER — SODIUM CHLORIDE, SODIUM LACTATE, POTASSIUM CHLORIDE, CALCIUM CHLORIDE 600; 310; 30; 20 MG/100ML; MG/100ML; MG/100ML; MG/100ML
INJECTION, SOLUTION INTRAVENOUS CONTINUOUS
Status: DISCONTINUED | OUTPATIENT
Start: 2017-07-07 | End: 2017-07-07 | Stop reason: HOSPADM

## 2017-07-07 RX ORDER — SODIUM CHLORIDE, SODIUM LACTATE, POTASSIUM CHLORIDE, CALCIUM CHLORIDE 600; 310; 30; 20 MG/100ML; MG/100ML; MG/100ML; MG/100ML
INJECTION, SOLUTION INTRAVENOUS CONTINUOUS PRN
Status: DISCONTINUED | OUTPATIENT
Start: 2017-07-07 | End: 2017-07-07

## 2017-07-07 RX ORDER — FLUMAZENIL 0.1 MG/ML
0.2 INJECTION, SOLUTION INTRAVENOUS
Status: DISCONTINUED | OUTPATIENT
Start: 2017-07-07 | End: 2017-07-10

## 2017-07-07 RX ORDER — CEFAZOLIN SODIUM 2 G/100ML
2 INJECTION, SOLUTION INTRAVENOUS
Status: COMPLETED | OUTPATIENT
Start: 2017-07-07 | End: 2017-07-07

## 2017-07-07 RX ORDER — SIMETHICONE 80 MG
80 TABLET,CHEWABLE ORAL 4 TIMES DAILY PRN
Status: DISCONTINUED | OUTPATIENT
Start: 2017-07-07 | End: 2017-07-12 | Stop reason: HOSPADM

## 2017-07-07 RX ORDER — NALBUPHINE HYDROCHLORIDE 10 MG/ML
2.5-5 INJECTION, SOLUTION INTRAMUSCULAR; INTRAVENOUS; SUBCUTANEOUS EVERY 6 HOURS PRN
Status: DISCONTINUED | OUTPATIENT
Start: 2017-07-07 | End: 2017-07-07

## 2017-07-07 RX ORDER — ACETAMINOPHEN 325 MG/1
650 TABLET ORAL EVERY 6 HOURS
Status: DISCONTINUED | OUTPATIENT
Start: 2017-07-07 | End: 2017-07-12 | Stop reason: HOSPADM

## 2017-07-07 RX ORDER — ALBUMIN, HUMAN INJ 5% 5 %
SOLUTION INTRAVENOUS CONTINUOUS PRN
Status: DISCONTINUED | OUTPATIENT
Start: 2017-07-07 | End: 2017-07-07

## 2017-07-07 RX ORDER — LIDOCAINE HYDROCHLORIDE 20 MG/ML
INJECTION, SOLUTION INFILTRATION; PERINEURAL PRN
Status: DISCONTINUED | OUTPATIENT
Start: 2017-07-07 | End: 2017-07-07

## 2017-07-07 RX ORDER — NALBUPHINE HYDROCHLORIDE 10 MG/ML
2.5-5 INJECTION, SOLUTION INTRAMUSCULAR; INTRAVENOUS; SUBCUTANEOUS EVERY 6 HOURS PRN
Status: DISCONTINUED | OUTPATIENT
Start: 2017-07-07 | End: 2017-07-09

## 2017-07-07 RX ORDER — HYDROMORPHONE HYDROCHLORIDE 1 MG/ML
.3-.5 INJECTION, SOLUTION INTRAMUSCULAR; INTRAVENOUS; SUBCUTANEOUS
Status: DISCONTINUED | OUTPATIENT
Start: 2017-07-07 | End: 2017-07-08

## 2017-07-07 RX ORDER — NALOXONE HYDROCHLORIDE 0.4 MG/ML
.1-.4 INJECTION, SOLUTION INTRAMUSCULAR; INTRAVENOUS; SUBCUTANEOUS
Status: DISCONTINUED | OUTPATIENT
Start: 2017-07-07 | End: 2017-07-07 | Stop reason: HOSPADM

## 2017-07-07 RX ORDER — ONDANSETRON 2 MG/ML
4 INJECTION INTRAMUSCULAR; INTRAVENOUS EVERY 30 MIN PRN
Status: DISCONTINUED | OUTPATIENT
Start: 2017-07-07 | End: 2017-07-07 | Stop reason: HOSPADM

## 2017-07-07 RX ORDER — OXYCODONE HYDROCHLORIDE 5 MG/1
5-10 TABLET ORAL
Status: DISCONTINUED | OUTPATIENT
Start: 2017-07-07 | End: 2017-07-07

## 2017-07-07 RX ORDER — HYDROMORPHONE HYDROCHLORIDE 1 MG/ML
.3-.5 INJECTION, SOLUTION INTRAMUSCULAR; INTRAVENOUS; SUBCUTANEOUS EVERY 5 MIN PRN
Status: DISCONTINUED | OUTPATIENT
Start: 2017-07-07 | End: 2017-07-07 | Stop reason: HOSPADM

## 2017-07-07 RX ORDER — NALBUPHINE HYDROCHLORIDE 10 MG/ML
2.5-5 INJECTION, SOLUTION INTRAMUSCULAR; INTRAVENOUS; SUBCUTANEOUS EVERY 6 HOURS PRN
Status: DISCONTINUED | OUTPATIENT
Start: 2017-07-07 | End: 2017-07-10

## 2017-07-07 RX ORDER — ESMOLOL HYDROCHLORIDE 10 MG/ML
INJECTION INTRAVENOUS PRN
Status: DISCONTINUED | OUTPATIENT
Start: 2017-07-07 | End: 2017-07-07

## 2017-07-07 RX ORDER — NALOXONE HYDROCHLORIDE 0.4 MG/ML
.1-.4 INJECTION, SOLUTION INTRAMUSCULAR; INTRAVENOUS; SUBCUTANEOUS
Status: DISCONTINUED | OUTPATIENT
Start: 2017-07-07 | End: 2017-07-09

## 2017-07-07 RX ORDER — FENTANYL CITRATE 50 UG/ML
INJECTION, SOLUTION INTRAMUSCULAR; INTRAVENOUS PRN
Status: DISCONTINUED | OUTPATIENT
Start: 2017-07-07 | End: 2017-07-07

## 2017-07-07 RX ORDER — SODIUM CHLORIDE, SODIUM LACTATE, POTASSIUM CHLORIDE, CALCIUM CHLORIDE 600; 310; 30; 20 MG/100ML; MG/100ML; MG/100ML; MG/100ML
INJECTION, SOLUTION INTRAVENOUS CONTINUOUS
Status: DISCONTINUED | OUTPATIENT
Start: 2017-07-07 | End: 2017-07-10

## 2017-07-07 RX ADMIN — ATORVASTATIN CALCIUM 80 MG: 80 TABLET, FILM COATED ORAL at 23:25

## 2017-07-07 RX ADMIN — AMOXICILLIN AND CLAVULANATE POTASSIUM 1 TABLET: 875; 125 TABLET, FILM COATED ORAL at 23:25

## 2017-07-07 RX ADMIN — ACETAMINOPHEN 650 MG: 325 TABLET, FILM COATED ORAL at 21:00

## 2017-07-07 RX ADMIN — MIDAZOLAM 1 MG: 1 INJECTION INTRAMUSCULAR; INTRAVENOUS at 12:15

## 2017-07-07 RX ADMIN — ONDANSETRON 4 MG: 2 INJECTION INTRAMUSCULAR; INTRAVENOUS at 18:24

## 2017-07-07 RX ADMIN — HYDROMORPHONE HYDROCHLORIDE 0.5 MG: 1 INJECTION, SOLUTION INTRAMUSCULAR; INTRAVENOUS; SUBCUTANEOUS at 19:15

## 2017-07-07 RX ADMIN — ALBUTEROL SULFATE 6 PUFF: 90 AEROSOL, METERED RESPIRATORY (INHALATION) at 17:44

## 2017-07-07 RX ADMIN — SODIUM CHLORIDE, POTASSIUM CHLORIDE, SODIUM LACTATE AND CALCIUM CHLORIDE: 600; 310; 30; 20 INJECTION, SOLUTION INTRAVENOUS at 16:42

## 2017-07-07 RX ADMIN — LIDOCAINE HYDROCHLORIDE 3 ML: 10; .005 INJECTION, SOLUTION EPIDURAL; INFILTRATION; INTRACAUDAL; PERINEURAL at 12:35

## 2017-07-07 RX ADMIN — CEFAZOLIN 1 G: 1 INJECTION, POWDER, FOR SOLUTION INTRAMUSCULAR; INTRAVENOUS at 15:19

## 2017-07-07 RX ADMIN — FENTANYL CITRATE 50 MCG: 50 INJECTION, SOLUTION INTRAMUSCULAR; INTRAVENOUS at 12:11

## 2017-07-07 RX ADMIN — HYDROMORPHONE HYDROCHLORIDE 0.5 MG: 1 INJECTION, SOLUTION INTRAMUSCULAR; INTRAVENOUS; SUBCUTANEOUS at 19:22

## 2017-07-07 RX ADMIN — ROCURONIUM BROMIDE 10 MG: 10 INJECTION INTRAVENOUS at 14:59

## 2017-07-07 RX ADMIN — FENTANYL CITRATE 50 MCG: 50 INJECTION, SOLUTION INTRAMUSCULAR; INTRAVENOUS at 14:59

## 2017-07-07 RX ADMIN — LIDOCAINE HYDROCHLORIDE 70 MG: 20 INJECTION, SOLUTION INFILTRATION; PERINEURAL at 12:59

## 2017-07-07 RX ADMIN — FENTANYL CITRATE 100 MCG: 50 INJECTION, SOLUTION INTRAMUSCULAR; INTRAVENOUS at 12:58

## 2017-07-07 RX ADMIN — HYDROMORPHONE HYDROCHLORIDE 0.5 MG: 1 INJECTION, SOLUTION INTRAMUSCULAR; INTRAVENOUS; SUBCUTANEOUS at 23:28

## 2017-07-07 RX ADMIN — MIDAZOLAM HYDROCHLORIDE 1 MG: 1 INJECTION, SOLUTION INTRAMUSCULAR; INTRAVENOUS at 12:38

## 2017-07-07 RX ADMIN — ROCURONIUM BROMIDE 10 MG: 10 INJECTION INTRAVENOUS at 16:34

## 2017-07-07 RX ADMIN — ALBUMIN (HUMAN): 12.5 SOLUTION INTRAVENOUS at 15:42

## 2017-07-07 RX ADMIN — PHENYLEPHRINE HYDROCHLORIDE 200 MCG: 10 INJECTION, SOLUTION INTRAMUSCULAR; INTRAVENOUS; SUBCUTANEOUS at 16:30

## 2017-07-07 RX ADMIN — ESCITALOPRAM OXALATE 20 MG: 20 TABLET ORAL at 08:37

## 2017-07-07 RX ADMIN — ESMOLOL HYDROCHLORIDE 20 MG: 10 INJECTION, SOLUTION INTRAVENOUS at 17:43

## 2017-07-07 RX ADMIN — FENTANYL CITRATE 50 MCG: 50 INJECTION, SOLUTION INTRAMUSCULAR; INTRAVENOUS at 15:53

## 2017-07-07 RX ADMIN — MAGNESIUM HYDROXIDE 15 ML: 400 SUSPENSION ORAL at 21:14

## 2017-07-07 RX ADMIN — ONDANSETRON 4 MG: 2 INJECTION INTRAMUSCULAR; INTRAVENOUS at 16:35

## 2017-07-07 RX ADMIN — ALBUTEROL SULFATE 6 PUFF: 90 AEROSOL, METERED RESPIRATORY (INHALATION) at 15:05

## 2017-07-07 RX ADMIN — CEFAZOLIN SODIUM 2 G: 2 INJECTION, SOLUTION INTRAVENOUS at 13:30

## 2017-07-07 RX ADMIN — FENTANYL CITRATE 50 MCG: 50 INJECTION, SOLUTION INTRAMUSCULAR; INTRAVENOUS at 17:57

## 2017-07-07 RX ADMIN — PROPOFOL 140 MG: 10 INJECTION, EMULSION INTRAVENOUS at 12:59

## 2017-07-07 RX ADMIN — ESMOLOL HYDROCHLORIDE 20 MG: 10 INJECTION, SOLUTION INTRAVENOUS at 17:39

## 2017-07-07 RX ADMIN — SODIUM CHLORIDE, POTASSIUM CHLORIDE, SODIUM LACTATE AND CALCIUM CHLORIDE: 600; 310; 30; 20 INJECTION, SOLUTION INTRAVENOUS at 12:38

## 2017-07-07 RX ADMIN — ROCURONIUM BROMIDE 60 MG: 10 INJECTION INTRAVENOUS at 12:59

## 2017-07-07 RX ADMIN — FENTANYL CITRATE 50 MCG: 50 INJECTION, SOLUTION INTRAMUSCULAR; INTRAVENOUS at 18:56

## 2017-07-07 RX ADMIN — SENNOSIDES AND DOCUSATE SODIUM 2 TABLET: 8.6; 5 TABLET ORAL at 23:25

## 2017-07-07 RX ADMIN — HYDROMORPHONE HYDROCHLORIDE 2 MG: 2 TABLET ORAL at 04:21

## 2017-07-07 RX ADMIN — SUGAMMADEX 200 MG: 100 INJECTION, SOLUTION INTRAVENOUS at 16:56

## 2017-07-07 RX ADMIN — FENTANYL CITRATE 50 MCG: 50 INJECTION, SOLUTION INTRAMUSCULAR; INTRAVENOUS at 17:10

## 2017-07-07 RX ADMIN — AMOXICILLIN AND CLAVULANATE POTASSIUM 1 TABLET: 875; 125 TABLET, FILM COATED ORAL at 08:37

## 2017-07-07 RX ADMIN — FLUTICASONE PROPIONATE 2 SPRAY: 50 SPRAY, METERED NASAL at 08:39

## 2017-07-07 RX ADMIN — ESMOLOL HYDROCHLORIDE 20 MG: 10 INJECTION, SOLUTION INTRAVENOUS at 17:31

## 2017-07-07 RX ADMIN — ROCURONIUM BROMIDE 20 MG: 10 INJECTION INTRAVENOUS at 13:58

## 2017-07-07 RX ADMIN — HYDROMORPHONE HYDROCHLORIDE 2 MG: 2 TABLET ORAL at 08:36

## 2017-07-07 RX ADMIN — FENTANYL CITRATE 50 MCG: 50 INJECTION, SOLUTION INTRAMUSCULAR; INTRAVENOUS at 14:06

## 2017-07-07 RX ADMIN — FENTANYL CITRATE 100 MCG: 50 INJECTION, SOLUTION INTRAMUSCULAR; INTRAVENOUS at 12:59

## 2017-07-07 RX ADMIN — FENTANYL CITRATE 50 MCG: 50 INJECTION, SOLUTION INTRAMUSCULAR; INTRAVENOUS at 15:22

## 2017-07-07 RX ADMIN — HYDROMORPHONE HYDROCHLORIDE 0.5 MG: 1 INJECTION, SOLUTION INTRAMUSCULAR; INTRAVENOUS; SUBCUTANEOUS at 19:34

## 2017-07-07 RX ADMIN — HYDROMORPHONE HYDROCHLORIDE 0.5 MG: 1 INJECTION, SOLUTION INTRAMUSCULAR; INTRAVENOUS; SUBCUTANEOUS at 21:00

## 2017-07-07 RX ADMIN — FENTANYL CITRATE 50 MCG: 50 INJECTION, SOLUTION INTRAMUSCULAR; INTRAVENOUS at 18:24

## 2017-07-07 RX ADMIN — ROCURONIUM BROMIDE 10 MG: 10 INJECTION INTRAVENOUS at 15:30

## 2017-07-07 RX ADMIN — SODIUM CHLORIDE, POTASSIUM CHLORIDE, SODIUM LACTATE AND CALCIUM CHLORIDE: 600; 310; 30; 20 INJECTION, SOLUTION INTRAVENOUS at 20:48

## 2017-07-07 RX ADMIN — FENTANYL CITRATE 50 MCG: 50 INJECTION, SOLUTION INTRAMUSCULAR; INTRAVENOUS at 19:03

## 2017-07-07 RX ADMIN — FENTANYL CITRATE 50 MCG: 50 INJECTION, SOLUTION INTRAMUSCULAR; INTRAVENOUS at 18:35

## 2017-07-07 RX ADMIN — PHENYLEPHRINE HYDROCHLORIDE 100 MCG: 10 INJECTION, SOLUTION INTRAMUSCULAR; INTRAVENOUS; SUBCUTANEOUS at 16:07

## 2017-07-07 RX ADMIN — BUPIVACAINE HYDROCHLORIDE: 7.5 INJECTION, SOLUTION EPIDURAL; RETROBULBAR at 18:45

## 2017-07-07 ASSESSMENT — ENCOUNTER SYMPTOMS
NAUSEA: 0
VOMITING: 0
DYSRHYTHMIAS: 1
CONSTIPATION: 0
ABDOMINAL PAIN: 1
SHORTNESS OF BREATH: 0
DIARRHEA: 0

## 2017-07-07 ASSESSMENT — PAIN DESCRIPTION - DESCRIPTORS
DESCRIPTORS: ACHING

## 2017-07-07 ASSESSMENT — VISUAL ACUITY
OU: NORMAL ACUITY

## 2017-07-07 NOTE — OP NOTE
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    Procedure: Exploratory laparotomy, total abdominal hysterectomy, left salpingo-oophorectomy, cancer staging including infracolic omentectomy, bilateral pelvic & para-aortic lymphadenectomy, peritoneal biopsies, evacuation of pelvic fluid.    Surgeon: Surgeon(s) and Role:     * Serena Cole MD - Primary     * Naty Pinzon, Fellow - Assist     * Danielle Barrios - Assist    Anesthesia: Combined General with Block     Estimated blood loss: 300 mL  IVF:  1100 mL crystalloid, 500 ml colloid  Urine: 955 mL    Drains: Chatterjee    Indications: Hafsa Corona is a 62 year old who presented to the ED with abdominal pain and was found to have a [id number] cm  pelvic mass,  was elevated at 1187. Recommendation made for the above procedure and patient agreed. Risks, benefits and alternatives discussed with patient who is agreeable. Informed consent obtained.    Findings:   Large amount of dark blood on entering the pelvis. Large 20 cm mass arising from the left ovary. Smooth ovarian capsule. Evidence of preoperative rupture of the mass with spillage of hemorrhagic material. On frozen section pathology examination, findings were c/w at least borderline tumor. Enlarged left pelvic lymph nodes, however, not grossly replaced by tumor. Right ovary and fallopian tube surgically absent. No other evidence of extra-ovarian spread of disease, final pathology pending.    Complications: None apparent    Specimens:   1) Left ovary + fallopian tube (frozen section pathology)  2) Uterus, cervix  3) Right pelvic lymph nodes  4) Left pelvic lymph nodes  5) Right para-aortic lymph nodes  6) Left para-aortic lymph nodes  7) Omentum  8) Right pelvic biopsy  9) Left pelvic  biopsy  10) Right paracolic gutter biopsy  11) Left paracolic gutter biopsy  12) Bladder peritoneum  13) Posterior cul-de-sac    Procedure: Patient was taken to the OR where general endotracheal anesthesia was performed.  She was placed in dorsal lithotomy position, prepped,  and draped in the normal sterile fashion. A rand catheter was placed in the bladder.  A vertical midline incision was made in the usual fashion. The pelvic and abdominal survey was performed with the above findings.  The bloody fluid was evacuated from the abdomen.  The bookwalter abdominal wall retractor was assembled, and the bowel was packed in the upper abdomen.  The large mass was exteriorized from the abdomen.  A window was made between the left infundibulopelvic ligament and utero-ovarian ligament. The ligaments were clamped, divided and suture ligated separately, and the left ovary was handed off the field, sent for frozen section pathology examination with the findings as noted above.  The left round ligament was cauterized and divided. The retroperitoneal space was opened for adequate identification of the left IP ligament and ureter. The left IP ligament was further skeletonized, double suture ligated and divided at the level of the pelvic brim. Right ovary surgically absent. The bilateral peritoneal incisions were extended toward the uterus. A bladder flap was created. The right round ligament was cauterized and divided. The retroperitoneal space was opened for adequate identification of the right ureter.  Once the bladder was taken down satisfactorily, the bilateral uterine arteries were skeletonized, suture ligated and divided down to the level of the cervical vaginal junction. A colpotomy incision was made sharply, amputating the cervix from the vagina. The vaginal cuff was closed with a Carleen stitch at each angle and 2 figure of eight stitches in the middle using 0-vicryl.  Frozen pathology of the mass returned as at least  borderline tumor.  Decision was made to proceed with cancer staging.  Bilateral pelvic lymphadenectomy was then performed. Lymph node dissection was performed from the bifurcation of the iliac vessels proximally to the circumflex iliac veins distally, and from the psoas muscle laterally to superior vesical arteries medially. The external iliac lymph nodes were reflected off the underlying vessels and dissected off in a lateral to medial direction. The obturator space was then further developed and the obturator nerves and vessels identified and preserved. Obturator lymph node dissection was performed from the bifurcation of the iliac vessels proximally to the deep circumflex iliac veins distally, and from the external iliac veins laterally to the superior vesical arteries medially and deep to the level of the obturator nerves. Lymph node dissection was performed identically on the left and right sides and lymph nodes were handed off the field as separate specimens.  The peritoneal incision was centered over the left common iliac artery and over the lower aorta. The left periaortic lymph node dissection was performed, removing the lymph nodes from the mid left common iliac artery over the lower inferior vena cava and aorta. The right periaortic lymphadenectomy was performed from the mid right common iliac artery inferiorly to the level of the duodenal reflection superiorly. All vascular pedicles were made hemostatic with placement of surgical clips.  Attention was then turned to the upper abdomen to perform the omentectomy, and the bowel was unpacked.. The Bovie cautery was used to isolate multiple vascular pedicles to detach the attachments of the omentum from the antimesenteric aspect of the transverse colon. The isolated vascular pedicles were clamped, cut, and suture ligated and the omentum was sent to pathology.  Peritoneal biopsies were taken from the bilateral pelvic sidewalls, the bilateral paracolic gutters,  posterior cul-de-sac and bladder peritoneum.  The pelvis was then irrigated copiously. All dissection sites and vascular pedicles were found to be hemostatic.  The  Fascia was then closed with 0 PDS starting at each extent of the incision to meet approximately 2/3 of the way up, taking bites 0.5 cm apart and 0.5 cm deep. The subcutaneous tissues were irrigated and verified to be hemostatic. They were then reapproximated with 2-0 Vicryl in a running fashion. Skin was closed with 4-0 Vicryl and topped with a Dermabond.      The patient tolerated the procedure well. Sponge, and lap counts were correct x2. There was an incorrect count of the instruments and abdominal/pelvic xray was performed and no instruments were found in the abdomen. She was extubated and taken to PACU in stable condition.      Dr. Cole was present and scrubbed for the entire procedure.       Naty Pinzon MD  Gynecologic Oncology Fellow      I was scrubbed, present and participate throughout the entire procedure.  I have reviewed and edited Dr. Pinzon's Operative note above.      Serena Cole  7/7/2017  6:34 PM

## 2017-07-07 NOTE — OR NURSING
Pt received epidural in pre op.  Tolerated well.  No s/s of lidocaine toxicity.  See MAR and flowsheets for specifics.

## 2017-07-07 NOTE — PLAN OF CARE
Patient A&O x4. VSS on room air. Pain well controlled with dilaudid. 20 mg Xeralto held per order. Surgery 11:15am 7/6/17. Visit with ZACARIAS was positive. Patient has POLST document in room, will fill out prior to AM. Did not ambulate. Moving independently. Denied N/V. Adequately voiding. BM at 5pm. Regular diet tolerating well. L thigh wound c/d/i w/ dry drainage, tender no pain. Continue to monitor and prepare for surgery.

## 2017-07-07 NOTE — ANESTHESIA CARE TRANSFER NOTE
Patient: Hafsa Corona    Procedure(s):  Exploratory Laparotomy, Left Salpingo-Oophorectomy, Cancer Staging, Total Hysterectomy, Omentectomy, Evacuation of abdominal fluid, Lymph Node Dissection  Anesthesia Block  - Wound Class: II-Clean Contaminated   - Wound Class: II-Clean Contaminated    Diagnosis: Pelvic Mass   Diagnosis Additional Information: No value filed.    Anesthesia Type:   General, Epidural     Note:  Airway :Face Mask  Patient transferred to:PACU  Comments: VSS, report to Rn       Vitals: (Last set prior to Anesthesia Care Transfer)    CRNA VITALS  7/7/2017 1720 - 7/7/2017 1757      7/7/2017             Pulse: 96    ART BP: (!)  164/97    ART Mean: 129    Ht Rate: 95    SpO2: 96 %    Resp Rate (set): 10                Electronically Signed By: MARLON Fuentes CRNA  July 7, 2017  5:57 PM

## 2017-07-07 NOTE — PROGRESS NOTES
SPIRITUAL HEALTH SERVICES  SPIRITUAL ASSESSMENT Progress Note  [facility] ([facility]) Labor and Delivery      PRIMARY FOCUS:     Goals of care     Responded to pre surgery request. Pt was not in 3C so  went to patient  Room      Emotional/spiritual/Lutheran distress    Pt was reflective about multiple challengers she has faced in her life leading up to his surgery.  While afraid she has a nuanced approach to the science of her condition while fully appreciating the psycho social revelations he has experiences.                        Support for coping                     She has a large community of people who support her      ILLNESS CIRCUMSTANCES:   R      Context of Serious Illness/Symptom(s) - Pt. Experience great discomfort and fear      Resources for Support -  Provided information about flow theory.      DISTRESS:     Emotional/Existential/Relational Distress -     Spiritual/Holiness Distress          SPIRITUAL/Druze COPING:     Jainism/Janay - Religious /Jain/'Restoration     Spiritual Practice(s) - Prayer, which we shared.     Emotional/Existential/Relational Connections -        GOALS OF CARE:    Goals of Care -      Meaning/Sense-Making - PLAN: Staff  will follow     Kim Redmond M.Div. [facility]  Staff   [phone]

## 2017-07-07 NOTE — ANESTHESIA PROCEDURE NOTES
Arterial Line Procedure Note  Staff:     Anesthesiologist:  LUTHER HUTCHINS  Location: In OR After Induction  Procedure Start/Stop Times:     patient identified, IV checked, site marked, risks and benefits discussed, informed consent, monitors and equipment checked, pre-op evaluation and at physician/surgeon's request      Correct Patient: Yes      Correct Position: Yes      Correct Site: Yes      Correct Procedure: Yes      Correct Laterality:  Yes    Site Marked:  Yes  Line Placement:     Procedure:  Arterial Line    Insertion Site:  Radial    Insertion laterality:  Left    Skin Prep: Chloraprep      Patient Prep: patient draped, mask, sterile gloves, hat and hand hygiene      Local skin infiltration:  None    Ultrasound Guided?: No      Catheter size:  20 gauge, 12 cm    Cath secured with: suture      Dressing:  Tegaderm    Complications:  None obvious    Arterial waveform: Yes      IBP within 10% of NIBP: Yes

## 2017-07-07 NOTE — PROCEDURES
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     Procedure: Exploratory laparotomy, total abdominal hysterectomy, left salpingo-oophorectomy, cancer staging including infracolic omentectomy, bilateral pelvic & para-aortic lymphadenectomy, peritoneal biopsies, evacuation of pelvic fluid.    Anesthesia Block      - Wound Class: II-Clean Contaminated   Surgeon: Serena Cole MD     Assistants(s): Danielle Rhodes     Estimated blood loss: 300 ml    Specimens: 1) Left ovary + fallopian tube (frozen section pathology)  2) Uterus, cervix  3) Right pelvic lymph nodes  4) Left pelvic lymph nodes  5) Right para-aortic lymph nodes  6) Left para-aortic lymph nodes  7) Omentum  8) Right pelvic biopsy  9) Left pelvic biopsy  10) Right paracolic gutter biopsy  11) Left paracolic gutter biopsy  12) Bladder peritoneum  13) Posterior cul-de-sac       Findings: Large amount of dark blood on entering the pelvis. Large 20 cm mass arising from the left ovary. Smooth ovarian capsule. Evidence of preoperative rupture of the mass with spillage of hemorrhagic material. On frozen section pathology examination, findings were c/w at least borderline tumor. Enlarged left pelvic lymph nodes, however, not grossly replaced by tumor. Right ovary and fallopian tube surgically absent. No other evidence of extra-ovarian spread of disease, final pathology pending.    Complications: None apparent    Please see full dictated Operative Note for details.    Serena Cole  7/7/2017  4:52 PM

## 2017-07-07 NOTE — PROGRESS NOTES
[facility] - Inpatient daily progress note    Date of admission: 7/2/2017  Date of service: 7/7/2017         Assessment and Plan:   Assessment:   Hafsa is a 62 year old female with h/o right salpingo-oopherectomy (2007), DVT (diagnosed 6/5/2017) on xarelto, DM2, HLD, admitted for evaluation of pelvic mass on CT concerning for gyn malignancy, on OR today with gyn/onc for both diagnosis and treatment. Also managing LUE abscess s/p I&D 7/2  Patient Active Problem List   Diagnosis     Attention deficit disorder     PANIC DISORDER      VASOMOTOR RHINITIS     Chronic Maxillary Sinusitis     Tachycardia     Type 2 diabetes mellitus without complication (H)     HYPERLIPIDEMIA LDL GOAL <100     Allergic rhinitis due to other allergen     BMI > 35     Essential hypertension     Lumbago     Major depressive disorder, recurrent episode, mild (H)     Long-term (current) use of anticoagulants [Z79.01]     Deep vein thrombosis (DVT) (H) [I82.409]     Pelvic mass in female      Plan:   ##Pelvic Mass on CT, concerning for malignancy  ##Pelvic Pain, abdominal distension    elevated at 1187. Gyn/onc consulted, OR today for both diagnosis and treatment.     -OR today for: exploratory laparotomy, bilateral SPO, possible cancer staging including hysterectomy, pelvic/para-aortic lymphadenopathy, omentectomy, peritoneal biopsies  --anticoagulation plan post op per gyn/onc. Hb check 24hrs post-operatively before starting any pharmacologic anticoagulation. Anticipate starting lovenox post-op.  -NPO for OR  -post op check this evening  -pain control with dilaudid 2-4mg PO q4h prn, tylenol 1000mg q8h prn. May require IV pain medication post op if unable to tolerate PO due to nausea from gen anesthesia.  -zofran for nausea    ##Left upper extremity abscess s/p I&D-improving  Due to dog bite incident on 6/27. S/P I&D 7/2 and then started on broad-spectrum abx. Patient up to date on tetanus. CRP down trending, and  remains afebrile.   -continue PO augmentin (7/4--). Today is day 5 of total Abx treatment (IV Vanc and Zosyn 7/3). Will plan to complete 7 days of Abx (7/3-7/10)  -daily dressing changes  -Leg elevation   -Pain relief with dilaudid 2-4mg PO q4h prn, tylenol 1000 mg Q8H PRN    ##Normocytic anemia  Baseline hemoglobin ~10. Her hemoglobin has been down trending over the last month. DDx include malignancy (chronic disease) vs nutritional deficiency. No active signs or symptoms of bleeding. Anemia work up revealing for low iron and low iron saturation. S/P 2 units PRBC 7/5 in setting of low Hb and optimization prior to OR, with improvement in Hb   -re-check Hb 24hrs post operatively   -consider IV iron post operatively    ##Constipation-improving  Bowel movement last night and this morning  -continue bowel regimen: Miralax BID and Senokot BID     ##tachypnea-resolved  ##hypoxia-resolved  Alternates between room air and supplemental O2 with stable O2 sats.Hafsa reports difficulty with deep inspiration, likely due to compression on diaphragm by mass preventing full lung expansion. No pleuritic chest pain and not in any apparent respiratory distress.   -continue incentive spirometry   -continuous pulse ox  -supplemental O2 as needed    ##Left lower extremity DVT  Diagnosed in early June 2017. Patient initially on warfarin but then started on xarelto. May be secondary to venous compression by pelvic mass causing venous stasis vs malignancy vs hypercoagulable state  -xarelto held for OR today  -Consider lovenox post operatively if Hb stable and in setting of likely malignancy      Chronic stable conditions:   ##HTN- Cont PTA lisinopril   ##Type 2 DM, HgbA1c 6 on 04/11/17  -Hold home metformin while patient in hospital  -Start MSSI correction while inpatient  ## HLD- Cont PTA lipitor  ##MDD, BLAIR, panic disorder- Cont PTA lexapro.   -psychology consulted and following      Fluids: PO  Electrolytes: not checked  Nutrition: NPO,  resume diet post op  Lines: PIV  Activity: -Up as tolerated  CODE: Full Code, POLST filled out and signed 7/7  Prophylaxis: on xarelto (to be held today)  PCP communication:  - Morelia Ayon  Dispo: will remain inpatient until surgery on Friday 7/7/2017, following this will be discharged pending post-op recovery.             Interval History:   No acute events overnight. Ready for OR today. Not feeling nervous or anxious. In good spirits. Is happy with her stay to date and the care she has received. No new concerns.             Review of Systems:   Review of Systems   Respiratory: Negative for shortness of breath.    Cardiovascular: Negative for chest pain.   Gastrointestinal: Positive for abdominal pain. Negative for constipation, diarrhea, nausea and vomiting.          Physical Exam (Resident / Clinician):     Vitals were reviewed  Patient Vitals for the past 8 hrs:   BP Temp Temp src Pulse Resp SpO2   07/07/17 1108 133/90 98.7  F (37.1  C) Oral 77 18 95 %   07/07/17 0620 121/86 97  F (36.1  C) Oral 81 16 93 %   07/07/17 0415 - - - - - 92 %   07/07/17 0414 125/78 98  F (36.7  C) Oral 74 16 90 %       Physical Exam   Constitutional: She appears well-developed and well-nourished. No distress.   Cardiovascular: Normal rate and normal heart sounds.    Pulmonary/Chest: Effort normal. No respiratory distress. She has no wheezes. She has no rales.   Abdominal: She exhibits distension and mass (palpable mass). There is no tenderness. There is no guarding.   Skin: She is not diaphoretic.   Left lateral thigh abscess s/p I&D, dressing blood-tinged. Not removed this morning.    Psychiatric: She has a normal mood and affect. Her behavior is normal. Judgment and thought content normal.     Intake/Output Summary (Last 24 hours) at 07/07/17 1147  Last data filed at 07/07/17 1000   Gross per 24 hour   Intake             1040 ml   Output             3125 ml   Net            -2085 ml           Data:   ROUTINE LABS (Last four  results)  CMP    Recent Labs  Lab 07/03/17  0934 07/02/17  2255 07/01/17  1841    141 141   POTASSIUM 3.9 4.1 4.0   CHLORIDE 106 104 105   CO2 26 25 23   ANIONGAP 8 12 13   * 129* 118*   BUN 17 19 20   CR 0.88 0.84 0.82   GFRESTIMATED 65 68 70   GFRESTBLACK 78 83 85   COLE 8.5 8.4* 9.3   PROTTOTAL  --  6.6* 7.4   ALBUMIN  --  3.2* 3.6   BILITOTAL  --  0.7 0.4   ALKPHOS  --  92 103   AST  --  27 21   ALT  --  14 17     CBC    Recent Labs  Lab 07/07/17  1138 07/06/17  0749 07/05/17  0910 07/04/17  1345   WBC 6.6 5.2 6.9 8.8   RBC 3.43* 3.34* 2.86* 3.19*   HGB 9.4* 9.1* 7.8* 8.6*   HCT 29.4* 28.4* 24.5* 27.0*   MCV 86 85 86 85   MCH 27.4 27.2 27.3 27.0   MCHC 32.0 32.0 31.8 31.9   RDW 14.7 14.6 14.8 14.6    295 289 303     INR    Recent Labs  Lab 07/02/17  2255   INR 1.17*     CRP    Recent Labs  Lab 07/06/17  0823 07/05/17  0714 07/04/17  1345   CRP 49.0* 84.0* 110.0*         Blood culture:  Invalid input(s): BC   Urine culture:  No results for input(s): URC in the last 168 hours.  All cultures:    Recent Labs  Lab 07/02/17  2302 07/02/17  2253   CULT No growth after 4 days No growth after 4 days           Medications:     Current Facility-Administered Medications   Medication     ceFAZolin sodium-dextrose (ANCEF) infusion 2 g     ceFAZolin (ANCEF) 1 g vial to attach to  ml bag for ADULT or 50 ml bag for PEDS     fentaNYL Citrate (PF) (SUBLIMAZE) injection 25-50 mcg     midazolam (VERSED) injection 1-2 mg     naloxone (NARCAN) injection 0.1-0.4 mg     flumazenil (ROMAZICON) injection 0.2 mg     [Auto Hold] ceFAZolin sodium-dextrose (ANCEF) infusion 2 g     lactated ringers infusion     [Rx hold ] rivaroxaban ANTICOAGULANT (XARELTO) tablet 20 mg     [Auto Hold] senna-docusate (SENOKOT-S;PERICOLACE) 8.6-50 MG per tablet 2 tablet     [Auto Hold] polyethylene glycol (MIRALAX/GLYCOLAX) Packet 17 g     [Auto Hold] HYDROmorphone (DILAUDID) tablet 2-4 mg     [Auto Hold] amoxicillin-clavulanate  (AUGMENTIN) 875-125 MG per tablet 1 tablet     [Auto Hold] albuterol (PROAIR HFA/PROVENTIL HFA/VENTOLIN HFA) Inhaler 2 puff     [Auto Hold] atorvastatin (LIPITOR) tablet 80 mg     [Auto Hold] cholecalciferol (vitamin D) tablet 2,000 Units     [Auto Hold] escitalopram (LEXAPRO) tablet 20 mg     [Auto Hold] fluticasone (FLONASE) 50 MCG/ACT spray 2 spray     [Auto Hold] lisinopril (PRINIVIL/ZESTRIL) tablet 10 mg     [Auto Hold] naloxone (NARCAN) injection 0.1-0.4 mg     Patient is already receiving anticoagulation with heparin, enoxaparin (LOVENOX), warfarin (COUMADIN)  or other anticoagulant medication     [Auto Hold] ondansetron (ZOFRAN-ODT) ODT tab 4 mg    Or     [Auto Hold] ondansetron (ZOFRAN) injection 4 mg     [Auto Hold] glucose 40 % gel 15-30 g    Or     [Auto Hold] dextrose 50 % injection 25-50 mL    Or     [Auto Hold] glucagon injection 1 mg     [Auto Hold] insulin aspart (NovoLOG) inj (RAPID ACTING)     [Auto Hold] insulin aspart (NovoLOG) inj (RAPID ACTING)     [Auto Hold] acetaminophen (TYLENOL) tablet 1,000 mg     [Auto Hold] multivitamin, therapeutic with minerals (THERA-VIT-M) tablet 1 tablet     [Auto Hold] vitamin B complex with vitamin C (STRESS TAB) tablet 1 tablet       Caring Physician: Daniela Vick MD  [facility]  Pager Contact: see Physician sticky note

## 2017-07-07 NOTE — ANESTHESIA PROCEDURE NOTES
Epidural Procedure Note    Staff:     Anesthesiologist:  ELLA JENKINS    Resident/CRNA:  CHRISTEN HARDY    Procedure performed by resident/CRNA in the presence of a teaching physician    Location: Pre-op     Procedure start time:  7/7/2017 12:10 PM     Procedure end time:  7/7/2017 12:35 PM   Pre-procedure checklist:   patient identified, IV checked, site marked, risks and benefits discussed, informed consent, monitors and equipment checked, pre-op evaluation, at physician/surgeon's request and post-op pain management      Correct Patient: Yes      Correct Position: Yes      Correct Site: Yes      Correct Procedure: Yes      Correct Laterality:  Yes    Site Marked:  Yes  Procedure:     Procedure:  Epidural catheter    Position:  Sitting    Sterile Prep: chloraprep, mask and sterile gloves      Insertion site:  T10-11    Local skin infiltration:  1% lidocaine    Approach:  Midline    Needle gauge (G):  17    Needle Length (in):  3.5    Block Needle Type:  Touhy    Injection Technique:  LORT saline    ALDO at (cm):  5    Attempts:  2    Redirects:  2    Catheter gauge (G):  19    Catheter threaded easily: Yes      Threaded to cm at skin:  9    Threaded in epidural space (cm):  4    Paresthesias:  No    Aspiration negative for Heme or CSF: Yes       Local anesthetic:  Lidocaine 1.5% w/ 1:200,000 epinephrine    Test dose time:  12:30  Assessment/Narrative:      Informed consent obtained.  All risks and benefits of the nerve block discussed with the patient.  All questions answered and all parties agreed with the plan.     Patient stopped Xarelto 2 days prior and will be monitored with neuro checks and back pain.     Christen Hardy MD

## 2017-07-07 NOTE — ANESTHESIA PREPROCEDURE EVALUATION
Anesthesia Evaluation     . Pt has had prior anesthetic. Type: General           ROS/MED HX    ENT/Pulmonary:  - neg pulmonary ROS     Neurologic:  - neg neurologic ROS     Cardiovascular:     (+) ----. : . . . :. dysrhythmias Irregular Heartbeat/Palpitations, .       METS/Exercise Tolerance:     Hematologic:     (+) History of blood clots pt is anticoagulated, Anemia, History of Transfusion no previous transfusion reaction -      Musculoskeletal:  - neg musculoskeletal ROS       GI/Hepatic:  - neg GI/hepatic ROS       Renal/Genitourinary:  - ROS Renal section negative       Endo:     (+) type II DM .      Psychiatric:     (+) psychiatric history anxiety      Infectious Disease:  - neg infectious disease ROS       Malignancy:         Other:                     Physical Exam  Normal systems: cardiovascular and pulmonary    Airway   Mallampati: III  TM distance: >3 FB  Neck ROM: full    Dental   (+) missing    Cardiovascular       Pulmonary     Other findings: Stated missing molar was broken and spontaneously fell out of socket                Anesthesia Plan      History & Physical Review  History and physical reviewed and following examination; no interval change.    ASA Status:  2 .    NPO Status:  > 8 hours    Plan for General and Epidural with Propofol induction. Maintenance will be Balanced.    PONV prophylaxis:  Ondansetron (or other 5HT-3)  Additional equipment: 2nd IV and Arterial Line      Postoperative Care  Postoperative pain management:  IV analgesics and Peripheral nerve block (Continuous).      Consents  Anesthetic plan, risks, benefits and alternatives discussed with:  Patient.  Use of blood products discussed: Yes.   Use of blood products discussed with Patient.  Consented to blood products.  .                          .

## 2017-07-07 NOTE — ANESTHESIA PROCEDURE NOTES
Central Line Procedure Note  Staff:     Anesthesiologist:  LUTHER HUTCHINS  Location: In OR after induction  Procedure Start/Stop Times:     patient identified, IV checked, site marked, risks and benefits discussed, informed consent, monitors and equipment checked, pre-op evaluation and at physician/surgeon's request      Correct Patient: Yes      Correct Position: Yes      Correct Site: Yes      Correct Procedure: Yes      Correct Laterality:  Yes    Site Marked:  Yes  Line Placement:     Procedure:  Central Line    Insertion laterality:  Left    Insertion site:  Subclavian    Position:  Trendelenburg      Maximal Sterile Barriers: All elements of maximal sterile barrier technique followed      (Maximal sterile barriers include:   Sterile gown, Sterile Gloves, Mask, Cap, Whole body draped, hand hygiene and acceptable skin prep).Skin Prep: Chloraprep         Injection Technique:  Seldinger Technique    Local skin infiltration:  None    Catheter size:  7 Fr, 3 lumen, 30 cm, (Hands-Free)    Catheter length at skin (cm):  15    Cath secured with: suture      Dressing:  Tegaderm    Complications:  None obvious    Blood aspirated all lumens: Yes      All Lumens Flushed: Yes    Assessment/Narrative:      Multiple failed peripheral IV attempts.  Pt placed in Trendelenburg, subclavian placed without difficulty.

## 2017-07-07 NOTE — PLAN OF CARE
Problem: Goal Outcome Summary  Goal: Goal Outcome Summary  Outcome: No Change  Surgery planned for 1115, NPO since midnight. Scrub x1 completed last night, plans for shower this morning. Abdomen distended. Passing flatus, no BM this shift. Abdominal discomfort managed with PO Dilaudid. Denies nausea. PIV saline locked. Dog bite to left thigh, mepilex intact with dried drainage. Known DVT to LLE. BG checks. Voiding spontaneously. Up ad galileo. VSS, placed on 1 L overnight.     Addendum: Pre-op shower completed. Pre-op checklist started.

## 2017-07-07 NOTE — PROGRESS NOTES
"Social Work Services Progress Note    Hospital Day: 6  Date of Initial Social Work Evaluation:  07/05/17  Collaborated with:  Pt's sisters (Sue: [phone] and Christine: [phone])    Data:  Surgery today for pelvic mass. D/c plan pending post op course.     Intervention:  SW spoke with pt's sisters (Sue and Christine) via phone. Sue shared that pt has hoarding issue, which may cause problems for a home health care RN.  Sue said that pt also has a dog who has bitten pt and \"will bite anyone who walks through the door.\" Sue said that pt has a  who is currently taking care of the dog. Sue said they are working on getting a housekeeping into pt's home. ZACARIAS inquired as to whether pt's  would be able to take pt's dogs while pt recovers. Sue asked SW or RNCC to speak with pt about this. Sue inquired about TCU vs home care. ZACARIAS explained d/c planning and that it would depend on pt's post op course. Sue asked SW or RNCC to coordinate with pt's brother, Phil ([phone]) around d/c planning. SW said she will need to obtain pt's permission to speak with Phil prior to doing this.    Assessment:  Pt's sisters concerned about condition of pt's home and pt's dog in pt's recovery at home. Will need to get pt's permission to speak with her brother, Phil, re: d/c planning.     Plan:    Anticipated Disposition:  TBD    Barriers to d/c plan:  Medical stability    Follow Up:  SW will continue to follow to assist with d/c planning and provide support.    MARY Marie, LGSW  7C Surgical Oncology Unit  Phone: [phone]  Pager: [phone]        "

## 2017-07-07 NOTE — PLAN OF CARE
Problem: Goal Outcome Summary  Goal: Goal Outcome Summary  Outcome: No Change  Up ad galileo.  PIV saline locked.  Pre-op showers done, pre-op checklist completed.  POLST completed, faxed to medical records.  Abdominal pain well managed using 2mg PO dilaudid, last received at 0930.  Voided on call to OR.  Report called to 3C RN, patient to OR at 1100.

## 2017-07-08 LAB
ANION GAP SERPL CALCULATED.3IONS-SCNC: 5 MMOL/L (ref 3–14)
BUN SERPL-MCNC: 20 MG/DL (ref 7–30)
CALCIUM SERPL-MCNC: 8.5 MG/DL (ref 8.5–10.1)
CHLORIDE SERPL-SCNC: 106 MMOL/L (ref 94–109)
CO2 SERPL-SCNC: 30 MMOL/L (ref 20–32)
CREAT SERPL-MCNC: 1.08 MG/DL (ref 0.52–1.04)
ERYTHROCYTE [DISTWIDTH] IN BLOOD BY AUTOMATED COUNT: 14.9 % (ref 10–15)
GFR SERPL CREATININE-BSD FRML MDRD: 51 ML/MIN/1.7M2
GLUCOSE BLDC GLUCOMTR-MCNC: 127 MG/DL (ref 70–99)
GLUCOSE BLDC GLUCOMTR-MCNC: 142 MG/DL (ref 70–99)
GLUCOSE BLDC GLUCOMTR-MCNC: 165 MG/DL (ref 70–99)
GLUCOSE SERPL-MCNC: 124 MG/DL (ref 70–99)
HCT VFR BLD AUTO: 25.7 % (ref 35–47)
HGB BLD-MCNC: 8.3 G/DL (ref 11.7–15.7)
MCH RBC QN AUTO: 27.7 PG (ref 26.5–33)
MCHC RBC AUTO-ENTMCNC: 32.3 G/DL (ref 31.5–36.5)
MCV RBC AUTO: 86 FL (ref 78–100)
PLATELET # BLD AUTO: 328 10E9/L (ref 150–450)
POTASSIUM SERPL-SCNC: 4 MMOL/L (ref 3.4–5.3)
RBC # BLD AUTO: 3 10E12/L (ref 3.8–5.2)
SODIUM SERPL-SCNC: 141 MMOL/L (ref 133–144)
WBC # BLD AUTO: 8.8 10E9/L (ref 4–11)

## 2017-07-08 PROCEDURE — 25000132 ZZH RX MED GY IP 250 OP 250 PS 637: Performed by: STUDENT IN AN ORGANIZED HEALTH CARE EDUCATION/TRAINING PROGRAM

## 2017-07-08 PROCEDURE — S0020 INJECTION, BUPIVICAINE HYDRO: HCPCS | Performed by: STUDENT IN AN ORGANIZED HEALTH CARE EDUCATION/TRAINING PROGRAM

## 2017-07-08 PROCEDURE — 85027 COMPLETE CBC AUTOMATED: CPT | Performed by: HOSPITALIST

## 2017-07-08 PROCEDURE — 25000128 H RX IP 250 OP 636: Performed by: HOSPITALIST

## 2017-07-08 PROCEDURE — 00000146 ZZHCL STATISTIC GLUCOSE BY METER IP

## 2017-07-08 PROCEDURE — 25000128 H RX IP 250 OP 636: Performed by: STUDENT IN AN ORGANIZED HEALTH CARE EDUCATION/TRAINING PROGRAM

## 2017-07-08 PROCEDURE — 99024 POSTOP FOLLOW-UP VISIT: CPT | Mod: GC | Performed by: OBSTETRICS & GYNECOLOGY

## 2017-07-08 PROCEDURE — 25000125 ZZHC RX 250: Performed by: STUDENT IN AN ORGANIZED HEALTH CARE EDUCATION/TRAINING PROGRAM

## 2017-07-08 PROCEDURE — 36415 COLL VENOUS BLD VENIPUNCTURE: CPT | Performed by: HOSPITALIST

## 2017-07-08 PROCEDURE — 12000008 ZZH R&B INTERMEDIATE UMMC

## 2017-07-08 PROCEDURE — 25000132 ZZH RX MED GY IP 250 OP 250 PS 637: Performed by: FAMILY MEDICINE

## 2017-07-08 PROCEDURE — 25000132 ZZH RX MED GY IP 250 OP 250 PS 637: Performed by: HOSPITALIST

## 2017-07-08 PROCEDURE — 80048 BASIC METABOLIC PNL TOTAL CA: CPT | Performed by: HOSPITALIST

## 2017-07-08 RX ORDER — METOCLOPRAMIDE HYDROCHLORIDE 5 MG/ML
10 INJECTION INTRAMUSCULAR; INTRAVENOUS EVERY 6 HOURS PRN
Status: DISCONTINUED | OUTPATIENT
Start: 2017-07-07 | End: 2017-07-10

## 2017-07-08 RX ORDER — AMOXICILLIN 250 MG
1-2 CAPSULE ORAL 2 TIMES DAILY
Status: DISCONTINUED | OUTPATIENT
Start: 2017-07-08 | End: 2017-07-08

## 2017-07-08 RX ORDER — LORAZEPAM 2 MG/ML
0.5 INJECTION INTRAMUSCULAR ONCE
Status: COMPLETED | OUTPATIENT
Start: 2017-07-08 | End: 2017-07-08

## 2017-07-08 RX ORDER — METOCLOPRAMIDE 10 MG/1
10 TABLET ORAL EVERY 6 HOURS PRN
Status: DISCONTINUED | OUTPATIENT
Start: 2017-07-07 | End: 2017-07-10

## 2017-07-08 RX ORDER — ONDANSETRON 4 MG/1
4 TABLET, ORALLY DISINTEGRATING ORAL EVERY 6 HOURS PRN
Status: DISCONTINUED | OUTPATIENT
Start: 2017-07-07 | End: 2017-07-10

## 2017-07-08 RX ORDER — GABAPENTIN 100 MG/1
200 CAPSULE ORAL 3 TIMES DAILY
Status: DISCONTINUED | OUTPATIENT
Start: 2017-07-08 | End: 2017-07-09

## 2017-07-08 RX ORDER — PROCHLORPERAZINE 25 MG
25 SUPPOSITORY, RECTAL RECTAL EVERY 12 HOURS PRN
Status: DISCONTINUED | OUTPATIENT
Start: 2017-07-07 | End: 2017-07-10

## 2017-07-08 RX ORDER — NALOXONE HYDROCHLORIDE 0.4 MG/ML
.1-.4 INJECTION, SOLUTION INTRAMUSCULAR; INTRAVENOUS; SUBCUTANEOUS
Status: DISCONTINUED | OUTPATIENT
Start: 2017-07-07 | End: 2017-07-10

## 2017-07-08 RX ORDER — ONDANSETRON 2 MG/ML
4 INJECTION INTRAMUSCULAR; INTRAVENOUS EVERY 6 HOURS PRN
Status: DISCONTINUED | OUTPATIENT
Start: 2017-07-07 | End: 2017-07-10

## 2017-07-08 RX ORDER — PROCHLORPERAZINE MALEATE 5 MG
5-10 TABLET ORAL EVERY 6 HOURS PRN
Status: DISCONTINUED | OUTPATIENT
Start: 2017-07-07 | End: 2017-07-10

## 2017-07-08 RX ADMIN — AMOXICILLIN AND CLAVULANATE POTASSIUM 1 TABLET: 875; 125 TABLET, FILM COATED ORAL at 20:09

## 2017-07-08 RX ADMIN — HYDROMORPHONE HYDROCHLORIDE: 10 INJECTION, SOLUTION INTRAMUSCULAR; INTRAVENOUS; SUBCUTANEOUS at 02:32

## 2017-07-08 RX ADMIN — LORAZEPAM 0.5 MG: 2 INJECTION INTRAMUSCULAR; INTRAVENOUS at 02:28

## 2017-07-08 RX ADMIN — POLYETHYLENE GLYCOL 3350 17 G: 17 POWDER, FOR SOLUTION ORAL at 20:09

## 2017-07-08 RX ADMIN — INSULIN ASPART 1 UNITS: 100 INJECTION, SOLUTION INTRAVENOUS; SUBCUTANEOUS at 12:28

## 2017-07-08 RX ADMIN — VITAMIN D, TAB 1000IU (100/BT) 2000 UNITS: 25 TAB at 10:41

## 2017-07-08 RX ADMIN — ACETAMINOPHEN 650 MG: 325 TABLET, FILM COATED ORAL at 02:34

## 2017-07-08 RX ADMIN — BUPIVACAINE HYDROCHLORIDE: 7.5 INJECTION, SOLUTION EPIDURAL; RETROBULBAR at 18:48

## 2017-07-08 RX ADMIN — ENOXAPARIN SODIUM 40 MG: 40 INJECTION SUBCUTANEOUS at 10:34

## 2017-07-08 RX ADMIN — ACETAMINOPHEN 650 MG: 325 TABLET, FILM COATED ORAL at 21:47

## 2017-07-08 RX ADMIN — AMOXICILLIN AND CLAVULANATE POTASSIUM 1 TABLET: 875; 125 TABLET, FILM COATED ORAL at 08:42

## 2017-07-08 RX ADMIN — ACETAMINOPHEN 650 MG: 325 TABLET, FILM COATED ORAL at 14:39

## 2017-07-08 RX ADMIN — POLYETHYLENE GLYCOL 3350 17 G: 17 POWDER, FOR SOLUTION ORAL at 10:44

## 2017-07-08 RX ADMIN — B-COMPLEX W/ C & FOLIC ACID TAB 1 TABLET: TAB at 10:41

## 2017-07-08 RX ADMIN — SODIUM CHLORIDE, POTASSIUM CHLORIDE, SODIUM LACTATE AND CALCIUM CHLORIDE: 600; 310; 30; 20 INJECTION, SOLUTION INTRAVENOUS at 10:38

## 2017-07-08 RX ADMIN — Medication: at 22:30

## 2017-07-08 RX ADMIN — SENNOSIDES AND DOCUSATE SODIUM 2 TABLET: 8.6; 5 TABLET ORAL at 20:09

## 2017-07-08 RX ADMIN — MULTIPLE VITAMINS W/ MINERALS TAB 1 TABLET: TAB at 10:40

## 2017-07-08 RX ADMIN — MAGNESIUM HYDROXIDE 15 ML: 400 SUSPENSION ORAL at 10:43

## 2017-07-08 RX ADMIN — INSULIN ASPART 1 UNITS: 100 INJECTION, SOLUTION INTRAVENOUS; SUBCUTANEOUS at 17:41

## 2017-07-08 RX ADMIN — HYDROMORPHONE HYDROCHLORIDE: 10 INJECTION, SOLUTION INTRAMUSCULAR; INTRAVENOUS; SUBCUTANEOUS at 01:23

## 2017-07-08 RX ADMIN — ACETAMINOPHEN 650 MG: 325 TABLET, FILM COATED ORAL at 08:40

## 2017-07-08 RX ADMIN — SENNOSIDES AND DOCUSATE SODIUM 2 TABLET: 8.6; 5 TABLET ORAL at 10:36

## 2017-07-08 RX ADMIN — ATORVASTATIN CALCIUM 80 MG: 80 TABLET, FILM COATED ORAL at 20:09

## 2017-07-08 RX ADMIN — GABAPENTIN 200 MG: 100 CAPSULE ORAL at 23:11

## 2017-07-08 RX ADMIN — SODIUM CHLORIDE, POTASSIUM CHLORIDE, SODIUM LACTATE AND CALCIUM CHLORIDE: 600; 310; 30; 20 INJECTION, SOLUTION INTRAVENOUS at 18:45

## 2017-07-08 RX ADMIN — ESCITALOPRAM OXALATE 20 MG: 20 TABLET ORAL at 08:38

## 2017-07-08 RX ADMIN — SODIUM CHLORIDE, POTASSIUM CHLORIDE, SODIUM LACTATE AND CALCIUM CHLORIDE: 600; 310; 30; 20 INJECTION, SOLUTION INTRAVENOUS at 02:34

## 2017-07-08 RX ADMIN — FLUTICASONE PROPIONATE 2 SPRAY: 50 SPRAY, METERED NASAL at 08:46

## 2017-07-08 ASSESSMENT — PAIN DESCRIPTION - DESCRIPTORS
DESCRIPTORS: ACHING

## 2017-07-08 NOTE — ANESTHESIA POSTPROCEDURE EVALUATION
Patient: Hafsa Corona    Procedure(s):  Exploratory Laparotomy, Left Salpingo-Oophorectomy, Cancer Staging, Total Hysterectomy, Omentectomy, Evacuation of abdominal fluid, Lymph Node Dissection  Anesthesia Block  - Wound Class: II-Clean Contaminated   - Wound Class: II-Clean Contaminated    Diagnosis:Pelvic Mass   Diagnosis Additional Information: No value filed.    Anesthesia Type:  General, Epidural    Note:  Anesthesia Post Evaluation    Patient location during evaluation: PACU  Patient participation: Able to fully participate in evaluation  Level of consciousness: awake and alert  Pain management: adequate  Airway patency: patent  Cardiovascular status: hemodynamically stable  Respiratory status: acceptable  Hydration status: stable  PONV: none     Anesthetic complications: None          Last vitals:  Vitals:    07/07/17 1830 07/07/17 1845 07/07/17 1900   BP: (!) 145/92 (!) 158/96 (!) 160/113   Pulse:      Resp: 12 12 (P) 12   Temp:      SpO2: 96% 94% 93%         Electronically Signed By: Philip Marley MD  July 7, 2017  7:05 PM

## 2017-07-08 NOTE — PROGRESS NOTES
07/08/17 0941   Visit Information   Visit Made By  Intern (Level 1)   Type of Visit On-call   Visited Patient   SPIRITUAL HEALTH SERVICES  SPIRITUAL ASSESSMENT Progress Note  [facility]   ON-CALL VISIT    REFERRAL SOURCE: Epic consult.    I visited with Hafsa, and she told me about her connection to [employer] and [employer].  We prayed at the end of my visit, and she said she appreciated my coming,    PLAN: Will follow while on-call.    Medardo Rolon   Intern  Pager [phone]

## 2017-07-08 NOTE — PROGRESS NOTES
GYN ONC PROGRESS NOTE  07/08/17    HD#:7 /POD#:1 XL, HELENE, LSO, omentectomy, evacuation of abdominal fluid, PPALND  Disease: Stage 1C borderline ovarian tumor    24 hour events:   - Poor pain control: dilaudid PCA was started    Subjective: Patient was sleeping soundly and is groogy waking up. Unable to answer many questions. Denies chest pain or SOB. Reports she still has pain but is able to sleep. Chatterjee catheter in place. She has not been up to ambulate yet.       Objective:   Vitals:    07/08/17 0202 07/08/17 0238 07/08/17 0244 07/08/17 0436   BP: 132/78   120/76   BP Location:    Right arm   Pulse:       Resp: 18 19 15 9   Temp:    96.2  F (35.7  C)   TempSrc:    Oral   SpO2:    94%   Weight:       Height:             Gen- no distress, tired  CV- Regular rate and rhythm, No murmurs, rubs or gallops  Pulm- Normal - Clear to auscultation without rales, rhonchi, or wheezing. and bilateral air exchange present  Abd- soft, appropriately tender, non distended  Incision- dry and intact  Extr- Normal and 1+ edema    I/Os  (Yesterday // Since Midnight)  PO 0cc // 0cc  IVF 2072cc // -cc  Urine 3480cc // -cc    New Labs/Imaging-   Results for orders placed or performed during the hospital encounter of 07/02/17 (from the past 24 hour(s))   Glucose by meter   Result Value Ref Range    Glucose 109 (H) 70 - 99 mg/dL   Glucose by meter   Result Value Ref Range    Glucose 100 (H) 70 - 99 mg/dL   CBC with platelets   Result Value Ref Range    WBC 6.6 4.0 - 11.0 10e9/L    RBC Count 3.43 (L) 3.8 - 5.2 10e12/L    Hemoglobin 9.4 (L) 11.7 - 15.7 g/dL    Hematocrit 29.4 (L) 35.0 - 47.0 %    MCV 86 78 - 100 fl    MCH 27.4 26.5 - 33.0 pg    MCHC 32.0 31.5 - 36.5 g/dL    RDW 14.7 10.0 - 15.0 %    Platelet Count 291 150 - 450 10e9/L   Basic metabolic panel   Result Value Ref Range    Sodium 144 133 - 144 mmol/L    Potassium 3.9 3.4 - 5.3 mmol/L    Chloride 110 (H) 94 - 109 mmol/L    Carbon Dioxide 30 20 - 32 mmol/L    Anion Gap 4 3 - 14  mmol/L    Glucose 90 70 - 99 mg/dL    Urea Nitrogen 21 7 - 30 mg/dL    Creatinine 1.14 (H) 0.52 - 1.04 mg/dL    GFR Estimate 48 (L) >60 mL/min/1.7m2    GFR Estimate If Black 58 (L) >60 mL/min/1.7m2    Calcium 9.1 8.5 - 10.1 mg/dL   Arterial Panel   Result Value Ref Range    pH Arterial 7.46 (H) 7.35 - 7.45 pH    pCO2 Arterial 38 35 - 45 mm Hg    pO2 Arterial 71 (L) 80 - 105 mm Hg    Bicarbonate Arterial 27 21 - 28 mmol/L    Base Excess Art 3.2 mmol/L    FIO2 65.0     Sodium 144 133 - 144 mmol/L    Potassium 3.6 3.4 - 5.3 mmol/L    Hemoglobin 8.8 (L) 11.7 - 15.7 g/dL    Glucose 115 (H) 70 - 99 mg/dL    Calcium Ionized Whole Blood 4.9 4.4 - 5.2 mg/dL   Arterial Panel   Result Value Ref Range    pH Arterial 7.45 7.35 - 7.45 pH    pCO2 Arterial 37 35 - 45 mm Hg    pO2 Arterial 77 (L) 80 - 105 mm Hg    Bicarbonate Arterial 26 21 - 28 mmol/L    Base Excess Art 1.6 mmol/L    FIO2 77     Sodium 144 133 - 144 mmol/L    Potassium 3.7 3.4 - 5.3 mmol/L    Hemoglobin 9.5 (L) 11.7 - 15.7 g/dL    Glucose 163 (H) 70 - 99 mg/dL    Calcium Ionized Whole Blood 4.9 4.4 - 5.2 mg/dL   Glucose by meter   Result Value Ref Range    Glucose 170 (H) 70 - 99 mg/dL   XR Chest Port 1 View    Narrative    EXAM: XR CHEST PORT 1 VW  7/7/2017 7:30 PM      HISTORY: cvc placement verification    COMPARISON: 7/3/2017    FINDINGS: AP radiograph the chest. Left subclavian central venous  catheter tip projects over the mid SVC. Chronic silhouette is  unchanged. Small pleural effusions. No pneumothorax. Streaky right  midlung opacity, likely atelectasis.       Impression    IMPRESSION:  1. Left subclavian central venous catheter tip projects over the mid  SVC. No pneumothorax.  2. Small pleural effusions.    I have personally reviewed the examination and initial interpretation  and I agree with the findings.    CLARA REICH MD   Glucose by meter   Result Value Ref Range    Glucose 152 (H) 70 - 99 mg/dL   Glucose by meter   Result Value Ref Range     Glucose 142 (H) 70 - 99 mg/dL       Pending cultures (date obtained): none    Assessment: Ms. Corona is a 62 year old female with a borderline ovarian tumor that is POD#1 from a XL, HELENE, LSO, omentectomy, evacuation of abdominal fluid, PPALND.     Active Problem list:    - postoperative state  - borderline ovarian tumor  - DVT  -anemia  - HTN  - Cellulitis  - T2DM    FEN:   - Regular diet    Pain:   - Tylenol and dilaudid PCA, epidural    Heme:   - Recent diagnosis on 6/5 of unprovoked LLE DVT, on Xarelto per hematology, restarted on POD#1   - Chronic anemia, Hgb 9.6 > 8.6 (likely hemodilution), will continue to monitor     CV:   - HTN, HLD. home Lisinopril held    Pulm:   - No prior hx of lung dz, however pt with SOB and wheezing on exam. Advair inhaler ordered per primary team, non-concerning CXR    GI: NI  :  - Chatterjee catheter in place, will remove when patient able to ambulate to the bathroom, most likely today    ID:   - Cellulitis: Pt has L thigh cellulitis 2/2 dog bite, febrile on admission. s/p IV Vanco/Zosyn. >24hr afebrile.  Currently on augmentin abx     Endocrine:   - T2DM. Medium SSI. Metformin held.     Psych/Neuro:   - anxiety/depression, on home Lexapro    PPX:   - Xarelto for LLE DVT, held 24 hours prior to surgery, plan to restart POD#1 if hgb stable    Disposition: pending postoperative course     Penny Quan MD PhD  Ob/Gyn PGY-2  7/8/2017 4:54 AM    Pager [phone]     The patient was seen and examined with the resident, the labs and vital signs were reviewed.The medical care outlined above was provided under my directives and direct supervision. Patient post operative day #1 s/p exploratory laparotomy, adequate pain management. Continue to evaluate the site of dog bite and currently on antibiotics.    Laura Pack MD, MPH  Gynecology Oncology

## 2017-07-08 NOTE — PROGRESS NOTES
Went to assess patient for a post-op check. She reports pain at the incision site. Other than that patient doing well.   Temp: 97.7  F (36.5  C) Temp src: Oral BP: 128/89 Pulse: 76 Heart Rate: 104 Resp: 12 SpO2: 93 % O2 Device: Nasal cannula Oxygen Delivery: 3 LPM  Exam:   Constitutional: Patient somnolent, not in any acute distress  Chest : CTAB  CVS: RRR  Abdomen: Soft, incision site clean, dry, appropriately tender to palpation    Assessment and plan:  Hafsa is a 62 year old female with h/o right salpingo-oopherectomy (2007), DVT (diagnosed 6/5/2017) on xarelto, DM2, HLD, admitted for evaluation of pelvic mass on CT concerning for gyn malignancy, on OR today with gyn/onc for both diagnosis and treatment. Also managing LUE abscess s/p I&D 7/2. Is now S/p exploratory laparotomy, HELENE,Left salpingo-oophorectomy. POD#0    -Patient tolerated procedure well.   -ADAT. Will start with CLD once patient is more awake  -Continue capnography  -IV dilaudid for pain  -IV zofran for nausea  -Continue care plan as described in note from day team.  -Will continue to monitor patient closely.    Yolande Shankar MD MPH  PGY2- [facility], Family Medicine  Pager- [phone]

## 2017-07-08 NOTE — PROGRESS NOTES
Called by nurse to assess patient for increased pain. Upon arrival,patient states that her pain is 7/10 before using the PCA and 4/10 afterwards .  Pt denied symptoms of LAST. Pt hemodynamically stable. Bolused patient 5cc of 0.25% bupivicaine.  Increased epidural infusion from 10 ml/hr to 12 ml/hr  Patient reported improvement of symptoms within 10 minutes.  No signs of LAST upon recheck.  Neurologic function intact and unchanged on exam.  Instructed nurse to monitor VS q5min x1hr.    Pranav Pritchard Jr., MD  Anesthesia Resident - CA2  Pager: [phone]  7/8/2017  4:19 PM

## 2017-07-08 NOTE — PLAN OF CARE
"Problem: Goal Outcome Summary  Goal: Goal Outcome Summary  Outcome: No Change  Pt angry, frustrated, irritable at start of shift - in a lot of pain.  Anesthesia and GYN resident in to see patient - see notes at 00.  Anesthesia gave 5cc bolus, and increased epidural rate to 10cc/hr.  CMS intact.  MD ordered PCA to be started and ativan x1.  PCA started at 0.2mg dose, then increase to 0.3 after pt states pain still not relieved.  Both MDs aware of pt status.  Pt able to sleep after ativan given, pt more relaxed.  Sleepy, but arousable.    AVSS.  HR tachy with pain, 100-110s.  95% 3L NC.  LS diminished.  Capno machine changed, now all WNL.    Midline with dermabond c/d/i.  Faint BS.  Chatterjee with good UVol.  L thigh wound (bite) dressing c/d/i.  LLE DVT, no pneumoboot on L.  Pt taking sips overnight, denies nausea.  MIV@125.  L chest CVC - c/d/i.    Pt refusing to dangle this morning, \"It's too early\".  Educated pt on importance of activity after surgery, still declined.  Pt agreed to reposition at 0630 to get hovematt out from underneath.  Cont with POC.        "

## 2017-07-08 NOTE — PROGRESS NOTES
Perioperative Pain Service Cross Cover Note    S: Called by PACU RN to assess patient as she is having 10/10 pain in the immediate post op period.  Denies circumoral numbness, metallic taste, lightheadedness, visual and auditory disturbances, disorientation.       O: Patient is alert and oriented complaining of 10/10 incisional pain. Epidural site is C/D/I.   Vitals: 123/90, T: 96.6, P: 76, R: 12    A/P: 63 yo POD#0 now with severe incisional pain.   Will bolus catheter with 5 cc 0.25% bupivacaine   Will have PACU nurse monitor blood pressures q5 mins for the next hour      Bre Dyer MD  Anesthesiology Resident   [phone]    7/7/2017 7:40 PM

## 2017-07-08 NOTE — PROGRESS NOTES
Perioperative Pain Service Cross Cover Note    S: Neuro check done at 0000, neuro intact. Patient denies circumoral numbness, metallic taste, lightheadedness, visual and auditory disturbances, disorientation, or drowsiness.  She does have significant pain. She did have relief with the bolus of bupivacaine that she received at 1845.       O: Patient is alert and oriented complaining of severe pain. Strength and sensation is fully intact in upper and lower extremities.    Vitals: 147/83, T: 97.2, P: 76, R: 17    A/P: 63 yo POD# 0 now with increased pain.   Will bolus catheter with 5 cc 0.25% bupivacaine and increase epidural rate to 10  Will have nurse monitor blood pressures q5 mins for the next hour  Continue Q4 H neuro checks for 24 hours (next at 0400 by RN)    Bre Dyer MD  Anesthesiology Resident   [phone]    7/8/2017 12:05 AM

## 2017-07-08 NOTE — PLAN OF CARE
Problem: Goal Outcome Summary  Goal: Goal Outcome Summary  7C-PT: RN requesting PT to hold eval today secondary to bolus. CXL PT eval. Will reschedule for 7/9.

## 2017-07-08 NOTE — PROGRESS NOTES
Called to patient bedside by RN due to patient pain.  Patient reports she has excruciating pain right now. She is also very anxious about the pain. Anesthesia is present and managing epidural.    Will order PCA for patient and 0.5 mg Ativan.    Penny Quan MD PhD  Ob/Gyn PGY-2  7/8/2017 12:11 AM

## 2017-07-08 NOTE — PROGRESS NOTES
Gynecologic Oncology Postoperative Check Note  7/7/2017    S: Patient is tired and feels woosy. Denies chest pain or SBO. Reports poor pain control when waking up from surgery    O:  Vitals:    07/07/17 2130 07/07/17 2145 07/07/17 2200 07/07/17 2215   BP: 138/84 129/85 133/84 137/87   BP Location:       Pulse:       Resp: 15 13 13 11   Temp:    98.3  F (36.8  C)   TempSrc:    Oral   SpO2: 93% 92% 92% 92%   Weight:       Height:           Gen: tired no acute distress  Cardio: RRR, no murmur  Resp: clear to ascultation, no increased work of breathing  Abdomen: soft, appropriately tender  Incision: no bandage present, incision is clean and dry  Extremities: 1+ LE edema, SCD on right leg    A: 62 year old POD#0 s/p XL, LSO, HELENE, omentetomy, evacuation of abdominal fluid, PPALND.    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 Advair inhaler ordered per primary team, non-concerning CXR  GI: NI  : NI  ID: L thigh cellulitis due to dog bite, s/p IV vanco/zosyn D#4 augmentin  Endo: T2DM, medium SSI, metformin held  Psych/Neuro: anxiety/depression, on home Lexapro  PPX: Xarelto for LLE DVT, held 24 hours prior to surgery, plan to restart POD#1  Dispo: Pending postoperative course    Penny Quan MD PhD  OB/GYN PGY-2  7/7/2017 10:31 PM

## 2017-07-08 NOTE — PLAN OF CARE
Problem: Individualization  Goal: Patient Preferences  She sat in the chair for her meal (Jed ~ 10:30 am; she ate a regular meal without issue.) then walked to the arora with SBA. She is presently resting in bed while visiting with her home best. Ms Corona talked to her sister today via phone. PCA Dilaudid decreased to 0.1 (from 0.3 as ordered). Epidural infusing/ patent. Chatterjee patent. Sliding scale Insulin given X 1 this shift. Capno WNL. She seems comfortable and has been able to rest as well as be active. P boot on R leg only (as per order).

## 2017-07-08 NOTE — PLAN OF CARE
Problem: Goal Outcome Summary  Goal: Goal Outcome Summary  Outcome: Therapy, progress toward functional goals is gradual  Patient arrived to 7C from PACU at 2000. AVSS. A&O x4, but drowsy. Pain managed with epidural 8ml/hr, prn IV dilaudid, and scheduled tylenol. Midline incision with liquid bandage open to air. C/d/i. On regular diet, but no appetite. Tolerating sips of water. Denies nausea. Capno wnl. Patient has not dangled yet. BS hypoactive, denies BM or passing flatus. PLAN: pain management, increase activity, ROBF, continue plan of care.

## 2017-07-08 NOTE — PROGRESS NOTES
REGIONAL ANESTHESIA PAIN SERVICE CONTINUOUS NERVE INFUSION NOTE  SUBJECTIVE:  Interval History: Pt reports adequate pain control via continuous peripheral nerve block (CPNB) infusion.  Increased pain overnight requiring bolus, however pain is better controlled this AM.  No changes in neurologic status on exam.  Denies any weakness, paresthesias, circumoral numbness, metallic taste or tinnitus.  Pt is ambulating with assistance.  Patient is currently with no nausea or vomiting.        Clinically Aligned Pain Assessment (CAPA):   Comfort (How is your pain?): Tolerable with discomfort  Change in Pain (Since your last medication/intervention?): Getting better  Pain Control (How are your pain treatments working?):  Partially effective pain control  Functioning (Are you able to do activities to get better?) : Can do most things, but pain gets in the way of some   Sleep (Does your pain management allow you to sleep or rest?): Awake with occasional pain     Numerical Rating Scale:  4/10 at rest and 7/10 with movement.        Anticoagulation:  Lovenox 40 mg QD      OBJECTIVE:    Diagnostic:  Lab Results   Component Value Date    WBC 8.8 07/08/2017     Lab Results   Component Value Date    RBC 3.00 07/08/2017     Lab Results   Component Value Date    HGB 8.3 07/08/2017     Lab Results   Component Value Date    HCT 25.7 07/08/2017     Lab Results   Component Value Date     07/08/2017         Vitals:    Temp:  [35.7  C (96.2  F)-37.3  C (99.2  F)] 37.3  C (99.2  F)  Pulse:  [76-80] 76  Heart Rate:  [] 103  Resp:  [6-19] 11  BP: ()/() 145/84  MAP:  [97 mmHg-108 mmHg] 108 mmHg  Arterial Line BP: (133-143)/(70-77) 143/77  SpO2:  [92 %-100 %] 94 %    Exam:    Strength 5/5 and symmetric grossly in bilateral LE   T9 Epidural catheter site intact with dressing c/d/i, no tenderness, erythema, heme, edema      ASSESSMENT/PLAN:    Hafsa Corona is a 62 year old female POD #1 s/p COMBINED LAPAROTOMY, TUMOR  DEBULKING  SALPINGO-OOPHORECTOMY BILATERAL  COMBINED HYSTERECTOMY TOTAL ABDOMINAL, SALPINGO-OOPHORECTOMY, NODE DISSECTION and placement of T9 epidural catheter with  for analgesia.  Pt is receiving adequate analgesia with bupivacaine 0.125%, total infusion 10 mL/hour.  Pt is ambulating without difficulty.  No weakness or paresthesias, adequate sensory block.  No evidence of adverse side effects associated with local anesthetic.     - continue current total infusion of 10 mL/hour  - will continue to follow and adjust as needed  - Monitor  On-Q pump for depletion and replacement  - discussed plan with attending anesthesiologist    Pranav Pritchard Jr., MD  Anesthesia Resident - CA2  Pager: [phone]  7/8/2017  11:01 AM  Regional Anesthesia Pain Service      24 hour Job Code Pager.  For in-house use only.     [place]:  * * [phone]  [employer]: * * [phone]  Peds: * * [phone]  Enter call-back number and #      This pager only accepts text messages through [facility]

## 2017-07-08 NOTE — PROGRESS NOTES
July 8, 2017  0918    Spoke with gyn/onc.  They will assume care at this time.  Family medicine will sign off.  Recommend continuing Augmentin for 1 more day after today (d/c on 7/10) to complete 7 day course of antibiotics.     Bre Garcia DO, PGY3  Pager [phone]

## 2017-07-09 ENCOUNTER — APPOINTMENT (OUTPATIENT)
Dept: PHYSICAL THERAPY | Facility: CLINIC | Age: 62
DRG: 737 | End: 2017-07-09
Payer: COMMERCIAL

## 2017-07-09 LAB
BACTERIA SPEC CULT: NO GROWTH
BACTERIA SPEC CULT: NO GROWTH
GLUCOSE BLDC GLUCOMTR-MCNC: 118 MG/DL (ref 70–99)
GLUCOSE BLDC GLUCOMTR-MCNC: 119 MG/DL (ref 70–99)
GLUCOSE BLDC GLUCOMTR-MCNC: 133 MG/DL (ref 70–99)
GLUCOSE BLDC GLUCOMTR-MCNC: 134 MG/DL (ref 70–99)
GLUCOSE BLDC GLUCOMTR-MCNC: 139 MG/DL (ref 70–99)
GLUCOSE BLDC GLUCOMTR-MCNC: 182 MG/DL (ref 70–99)
Lab: NORMAL
Lab: NORMAL
MICRO REPORT STATUS: NORMAL
MICRO REPORT STATUS: NORMAL
SPECIMEN SOURCE: NORMAL
SPECIMEN SOURCE: NORMAL

## 2017-07-09 PROCEDURE — 25000128 H RX IP 250 OP 636: Performed by: STUDENT IN AN ORGANIZED HEALTH CARE EDUCATION/TRAINING PROGRAM

## 2017-07-09 PROCEDURE — 25000125 ZZHC RX 250: Performed by: STUDENT IN AN ORGANIZED HEALTH CARE EDUCATION/TRAINING PROGRAM

## 2017-07-09 PROCEDURE — 97110 THERAPEUTIC EXERCISES: CPT | Mod: GP

## 2017-07-09 PROCEDURE — 00000146 ZZHCL STATISTIC GLUCOSE BY METER IP

## 2017-07-09 PROCEDURE — 25000132 ZZH RX MED GY IP 250 OP 250 PS 637: Performed by: OBSTETRICS & GYNECOLOGY

## 2017-07-09 PROCEDURE — 97530 THERAPEUTIC ACTIVITIES: CPT | Mod: GP

## 2017-07-09 PROCEDURE — S0020 INJECTION, BUPIVICAINE HYDRO: HCPCS | Performed by: STUDENT IN AN ORGANIZED HEALTH CARE EDUCATION/TRAINING PROGRAM

## 2017-07-09 PROCEDURE — 25000128 H RX IP 250 OP 636: Performed by: HOSPITALIST

## 2017-07-09 PROCEDURE — 25000132 ZZH RX MED GY IP 250 OP 250 PS 637: Performed by: STUDENT IN AN ORGANIZED HEALTH CARE EDUCATION/TRAINING PROGRAM

## 2017-07-09 PROCEDURE — 25000132 ZZH RX MED GY IP 250 OP 250 PS 637: Performed by: FAMILY MEDICINE

## 2017-07-09 PROCEDURE — 97116 GAIT TRAINING THERAPY: CPT | Mod: GP

## 2017-07-09 PROCEDURE — 40000193 ZZH STATISTIC PT WARD VISIT

## 2017-07-09 PROCEDURE — 12000008 ZZH R&B INTERMEDIATE UMMC

## 2017-07-09 PROCEDURE — 25000132 ZZH RX MED GY IP 250 OP 250 PS 637: Performed by: HOSPITALIST

## 2017-07-09 PROCEDURE — 97161 PT EVAL LOW COMPLEX 20 MIN: CPT | Mod: GP

## 2017-07-09 RX ORDER — HYDROMORPHONE HYDROCHLORIDE 2 MG/1
2-4 TABLET ORAL
Status: DISCONTINUED | OUTPATIENT
Start: 2017-07-09 | End: 2017-07-11

## 2017-07-09 RX ORDER — GABAPENTIN 100 MG/1
200 CAPSULE ORAL 3 TIMES DAILY PRN
Status: DISCONTINUED | OUTPATIENT
Start: 2017-07-09 | End: 2017-07-10

## 2017-07-09 RX ORDER — OXYCODONE HYDROCHLORIDE 5 MG/1
5-10 TABLET ORAL
Status: DISCONTINUED | OUTPATIENT
Start: 2017-07-09 | End: 2017-07-09

## 2017-07-09 RX ORDER — HYDROMORPHONE HYDROCHLORIDE 1 MG/ML
0.5 INJECTION, SOLUTION INTRAMUSCULAR; INTRAVENOUS; SUBCUTANEOUS ONCE
Status: COMPLETED | OUTPATIENT
Start: 2017-07-09 | End: 2017-07-09

## 2017-07-09 RX ORDER — AMOXICILLIN 250 MG
1 CAPSULE ORAL ONCE
Status: DISCONTINUED | OUTPATIENT
Start: 2017-07-09 | End: 2017-07-09

## 2017-07-09 RX ADMIN — ACETAMINOPHEN 650 MG: 325 TABLET, FILM COATED ORAL at 10:45

## 2017-07-09 RX ADMIN — INSULIN ASPART 1 UNITS: 100 INJECTION, SOLUTION INTRAVENOUS; SUBCUTANEOUS at 08:22

## 2017-07-09 RX ADMIN — VITAMIN D, TAB 1000IU (100/BT) 2000 UNITS: 25 TAB at 10:46

## 2017-07-09 RX ADMIN — OXYCODONE HYDROCHLORIDE 10 MG: 5 TABLET ORAL at 10:25

## 2017-07-09 RX ADMIN — POLYETHYLENE GLYCOL 3350 17 G: 17 POWDER, FOR SOLUTION ORAL at 10:48

## 2017-07-09 RX ADMIN — AMOXICILLIN AND CLAVULANATE POTASSIUM 1 TABLET: 875; 125 TABLET, FILM COATED ORAL at 08:39

## 2017-07-09 RX ADMIN — B-COMPLEX W/ C & FOLIC ACID TAB 1 TABLET: TAB at 10:46

## 2017-07-09 RX ADMIN — RIVAROXABAN 20 MG: 20 TABLET, FILM COATED ORAL at 21:13

## 2017-07-09 RX ADMIN — OXYCODONE HYDROCHLORIDE 10 MG: 5 TABLET ORAL at 16:29

## 2017-07-09 RX ADMIN — SODIUM CHLORIDE, POTASSIUM CHLORIDE, SODIUM LACTATE AND CALCIUM CHLORIDE: 600; 310; 30; 20 INJECTION, SOLUTION INTRAVENOUS at 10:19

## 2017-07-09 RX ADMIN — ACETAMINOPHEN 650 MG: 325 TABLET, FILM COATED ORAL at 16:29

## 2017-07-09 RX ADMIN — MULTIPLE VITAMINS W/ MINERALS TAB 1 TABLET: TAB at 10:47

## 2017-07-09 RX ADMIN — SODIUM CHLORIDE, POTASSIUM CHLORIDE, SODIUM LACTATE AND CALCIUM CHLORIDE: 600; 310; 30; 20 INJECTION, SOLUTION INTRAVENOUS at 02:08

## 2017-07-09 RX ADMIN — HYDROMORPHONE HYDROCHLORIDE 2 MG: 2 TABLET ORAL at 21:19

## 2017-07-09 RX ADMIN — AMOXICILLIN AND CLAVULANATE POTASSIUM 1 TABLET: 875; 125 TABLET, FILM COATED ORAL at 21:04

## 2017-07-09 RX ADMIN — HYDROMORPHONE HYDROCHLORIDE 0.5 MG: 1 INJECTION, SOLUTION INTRAMUSCULAR; INTRAVENOUS; SUBCUTANEOUS at 04:15

## 2017-07-09 RX ADMIN — ACETAMINOPHEN 650 MG: 325 TABLET, FILM COATED ORAL at 21:13

## 2017-07-09 RX ADMIN — ATORVASTATIN CALCIUM 80 MG: 80 TABLET, FILM COATED ORAL at 21:04

## 2017-07-09 RX ADMIN — BUPIVACAINE HYDROCHLORIDE: 7.5 INJECTION, SOLUTION EPIDURAL; RETROBULBAR at 05:08

## 2017-07-09 RX ADMIN — POLYETHYLENE GLYCOL 3350 17 G: 17 POWDER, FOR SOLUTION ORAL at 21:05

## 2017-07-09 RX ADMIN — MULTIPLE VITAMINS W/ MINERALS TAB 1 TABLET: TAB at 10:48

## 2017-07-09 RX ADMIN — FLUTICASONE PROPIONATE 2 SPRAY: 50 SPRAY, METERED NASAL at 08:24

## 2017-07-09 RX ADMIN — SODIUM CHLORIDE, POTASSIUM CHLORIDE, SODIUM LACTATE AND CALCIUM CHLORIDE: 600; 310; 30; 20 INJECTION, SOLUTION INTRAVENOUS at 18:26

## 2017-07-09 RX ADMIN — ACETAMINOPHEN 650 MG: 325 TABLET, FILM COATED ORAL at 04:20

## 2017-07-09 RX ADMIN — GABAPENTIN 200 MG: 100 CAPSULE ORAL at 08:32

## 2017-07-09 RX ADMIN — ESCITALOPRAM OXALATE 20 MG: 20 TABLET ORAL at 08:32

## 2017-07-09 RX ADMIN — GABAPENTIN 200 MG: 100 CAPSULE ORAL at 14:31

## 2017-07-09 RX ADMIN — SENNOSIDES AND DOCUSATE SODIUM 2 TABLET: 8.6; 5 TABLET ORAL at 21:04

## 2017-07-09 RX ADMIN — SENNOSIDES AND DOCUSATE SODIUM 2 TABLET: 8.6; 5 TABLET ORAL at 08:34

## 2017-07-09 ASSESSMENT — PAIN DESCRIPTION - DESCRIPTORS
DESCRIPTORS: ACHING
DESCRIPTORS: ACHING;SORE
DESCRIPTORS: ACHING;CRAMPING;SHARP

## 2017-07-09 NOTE — PROVIDER NOTIFICATION
Paged MD Estefania Hayden to notify that Pt experiencing intolerable pain. MD Hayden returned page. Order placed for 1x dose of 0.5 mg IV dilaudid. Will continue to monitor.

## 2017-07-09 NOTE — PLAN OF CARE
Problem: Goal Outcome Summary  Goal: Goal Outcome Summary  Outcome: Therapy, progress toward functional goals is gradual  VSS on 2.5 L of O2 to keep sats >90. Pain moderately managed with epidural (increased to 12 ml/hr and given bolus at 1600 by anesthesia), PCA dilaudid 0.1 q10m. Tolerating regular diet. Denies nausea. Midline incision c/d/i. Left leg dog bite dressing c/d/i. Up with heavy assist x2. No BM this shift, but reports passing flatus. Feels the need to stool. Up in chair. PLAN: pain management, increase activity, ROBF. Continue plan of care.

## 2017-07-09 NOTE — PLAN OF CARE
Problem: Goal Outcome Summary  Goal: Goal Outcome Summary  7C-PT: PT eval completed, Tx indicated. Pt performed supine>sit with CGA-min A for trunk management and HOB slightly elevated. Performed STS transfers with supervision, requiring inc time secondary to pain. Pt ambulated ~20' to BR with UE on IV pole and SBA. Performed toilet transfer with supervision and use of grab bars. Educated pt on seated HEP for LE strengthening, pt declining to perform secondary to breakfast. Pt limited by pain and deconditioning.     Pending pt progress, (has 5 JOHNNY home and full flight to basement with laundry), rec dc to TCU when medically stable for progression of indep with functional mobility as pt is below baseline.

## 2017-07-09 NOTE — PROGRESS NOTES
REGIONAL ANESTHESIA PAIN SERVICE CONTINUOUS NERVE INFUSION NOTE  SUBJECTIVE:  Interval History: Pt reports minimal pain control via continuous peripheral nerve block (CPNB) infusion even with boluses.  Increased pain overnight requiring last bolus at 0400, however pain was better controlled today with continuation of oral pain medications.  No changes in neurologic status on exam.  Denies any weakness, paresthesias, circumoral numbness, metallic taste or tinnitus.  Pt is ambulating with assistance.  Patient is currently with no nausea or vomiting. Normally on home oxygen.     Clinically Aligned Pain Assessment (CAPA):   Comfort (How is your pain?): Uncomfortable, controlled with oral medications  Change in Pain (Since your last medication/intervention?): Stable  Pain Control (How are your pain treatments working?):  Partially effective pain control  Functioning (Are you able to do activities to get better?) : Can do most things with assistance, but pain gets in the way of some   Sleep (Does your pain management allow you to sleep or rest?): Awake with occasional pain, difficulty sleeping at times     Numerical Rating Scale:  5/10 at rest and 8/10 with movement.        Anticoagulation:  Lovenox 40 mg Q24h (LAST DOSE >24 hours ago)      OBJECTIVE:    Diagnostic:  Lab Results   Component Value Date    WBC 8.8 07/08/2017    HGB 8.3 (L) 07/08/2017    HCT 25.7 (L) 07/08/2017     07/08/2017     07/08/2017    POTASSIUM 4.0 07/08/2017    CHLORIDE 106 07/08/2017    CO2 30 07/08/2017    BUN 20 07/08/2017    CR 1.08 (H) 07/08/2017     (H) 07/08/2017    SED 25 06/05/2017    AST 27 07/02/2017    ALT 14 07/02/2017    ALKPHOS 92 07/02/2017    BILITOTAL 0.7 07/02/2017    INR 1.17 (H) 07/02/2017       Vitals:    Temp:  [36.5  C (97.7  F)-37.3  C (99.2  F)] 36.6  C (97.8  F)  Pulse:  [] 97  Heart Rate:  [101-107] 106  Resp:  [8-18] 16  BP: ()/(58-92) 125/71  SpO2:  [89 %-96 %] 96 %    Exam:    Strength  5/5 and symmetric grossly in bilateral LE, ambulates with assitance, on continuous oxygen   T9 Epidural catheter site intact with dressing c/d/i, no tenderness, erythema, heme, edema      ASSESSMENT/PLAN:    Hafsa Corona is a 62 year old female POD #2 s/p COMBINED LAPAROTOMY, TUMOR DEBULKING  SALPINGO-OOPHORECTOMY BILATERAL  COMBINED HYSTERECTOMY TOTAL ABDOMINAL, SALPINGO-OOPHORECTOMY, NODE DISSECTION and placement of T9 epidural catheter with  for analgesia.  Patient takes xarelto and last took a dose 7/5. Pt is receiving minimal analgesia with bupivacaine 0.125%, total infusion 14 mL/hour.  Pt is at baseline on oxygen and ambulates with assistance.  No weakness or paresthesias, adequate sensory block.  No evidence of adverse side effects associated with local anesthetic. Request from primary team made to remove epidural today.    - Epidural removed at 1630, tip intact, easily removed and no blood or discomfort noted  - Ok to start anticoagulation ~4 hours from removal  -Had extended discussion with patient on realistic expectations of pain management post op. She also appears sedated during interview, she was informed that due to current need for oxygen at baseline, opioids could lead to respiratory depression and may be held if she was overly sedated.  - discussed plan with attending anesthesiologist    Krishan Grady MD  Anesthesia Resident - CA2  Pager: [phone]  7/9/2017  15:55 PM  Regional Anesthesia Pain Service      24 hour Job Code Pager.  For in-house use only.     [place]:  * * [phone]  [facility]: * * [phone]  Peds: * * [phone]  Enter call-back number and #      This pager only accepts text messages through [facility]

## 2017-07-09 NOTE — PLAN OF CARE
"Problem: Individualization  Goal: Patient Preferences  PCA Dilaudid dc 'd per order and Oxycodone initiated. Epidural patent. Pt expresses much concern for pain management \"on the pills\" but she has been active: walking in the arora and sitting in the chair for meals. Regular diet without issues. She wears an abd binder for comfort. Abd incision C/D/I with Dermabond. CVC TL IV patent 125 cc/hr. No BM but she states she is passing gas. She has scheduled bowel meds. Ms Corona has talked with her sisters via phone.       "

## 2017-07-09 NOTE — PROVIDER NOTIFICATION
Spoke w/ MD Estefania Hayden to notify Pt reporting intolerable pain. Orders placed for PCA demand dose to be increased to 0.2 mg q10min and PO gabapentin. Will continue monitor.

## 2017-07-09 NOTE — ANESTHESIA POST-OP FOLLOW-UP NOTE
REGIONAL ANESTHESIA PAIN SERVICE CONTINUOUS NERVE INFUSION NOTE  SUBJECTIVE:  Interval History: Pt reports adequate pain control via continuous peripheral nerve block (CPNB) infusion.  Increased pain overnight requiring bolus, however pain is better controlled this AM.  No changes in neurologic status on exam.  Denies any weakness, paresthesias, circumoral numbness, metallic taste or tinnitus.  Pt is ambulating with assistance.  Patient is currently with no nausea or vomiting.                              Clinically Aligned Pain Assessment (CAPA):   Comfort (How is your pain?): Tolerable with discomfort  Change in Pain (Since your last medication/intervention?): Getting better  Pain Control (How are your pain treatments working?):  Partially effective pain control  Functioning (Are you able to do activities to get better?) : Can do most things, but pain gets in the way of some   Sleep (Does your pain management allow you to sleep or rest?): Awake with occasional pain                           Numerical Rating Scale:  4/10 at rest and 7/10 with movement.          Anticoagulation:  Lovenox 40 mg QD        OBJECTIVE:     Diagnostic:        Lab Results   Component Value Date     WBC 8.8 07/08/2017            Lab Results   Component Value Date     RBC 3.00 07/08/2017            Lab Results   Component Value Date     HGB 8.3 07/08/2017            Lab Results   Component Value Date     HCT 25.7 07/08/2017            Lab Results   Component Value Date      07/08/2017            Vitals:              Temp:  [35.7  C (96.2  F)-37.3  C (99.2  F)] 37.3  C (99.2  F)  Pulse:  [76-80] 76  Heart Rate:  [] 103  Resp:  [6-19] 11  BP: ()/() 145/84  MAP:  [97 mmHg-108 mmHg] 108 mmHg  Arterial Line BP: (133-143)/(70-77) 143/77  SpO2:  [92 %-100 %] 94 %     Exam:                         Strength 5/5 and symmetric grossly in bilateral LE                        T9 Epidural catheter site intact with dressing c/d/i,  no tenderness, erythema, heme, edema        ASSESSMENT/PLAN:    Hafsa Corona is a 62 year old female POD #1 s/p COMBINED LAPAROTOMY, TUMOR DEBULKING  SALPINGO-OOPHORECTOMY BILATERAL  COMBINED HYSTERECTOMY TOTAL ABDOMINAL, SALPINGO-OOPHORECTOMY, NODE DISSECTION and placement of T9 epidural catheter with  for analgesia.  Pt is receiving adequate analgesia with bupivacaine 0.125%, total infusion 10 mL/hour.  Pt is ambulating without difficulty.  No weakness or paresthesias, adequate sensory block.  No evidence of adverse side effects associated with local anesthetic.      - continue current total infusion of 10 mL/hour  - will continue to follow and adjust as needed  - Monitor  On-Q pump for depletion and replacement  Mohinder Nguyen MD  July 8, 2017

## 2017-07-09 NOTE — PROGRESS NOTES
Called by RN to assess patient for increased pain. Upon arrival, pt complaining of inadequate pain coverage.  States that her pain is around 8/10 without her PCA bolus and 6/10 with the bolus.  Primary team has doubled her PCA bolus since my last check.  Per RN, patient seemed to be doing well until around midnight, pain increased significantly after ambulation.  Pt denied symptoms of LAST. Pt hemodynamically stable.  Inspected infusion tubing, which appeared patent.  Catheter site clean, dry and intact. Bolused patient with 5 cc of 0.25% bupivicaine.  Increased infusion to 14 ml/hr.  Patient reported improvement of symptoms within 10 minutes.  No signs of LAST upon recheck.  No change in neurologic status.  Patient with 5/5 strength UE and LE bilaterally.  Instructed nurse to monitor VS q5min x1hr.    Pranav Pritchard Jr., MD  Anesthesia Resident - CA2  Pager: [phone]  7/9/2017  4:06 AM

## 2017-07-09 NOTE — PROGRESS NOTES
Pt c/o breakthrough pain after getting up to go the bathroom and describes it as unbearable.  Will give Dilaudid 0.5mg PO x1.  Due to concern for somnolence, Luiza RN was asked to monitor O2 after this extra dose.      Estefania Hayden PGY2

## 2017-07-09 NOTE — PLAN OF CARE
Problem: Goal Outcome Summary  Goal: Goal Outcome Summary  Outcome: No Change  VSS. On 2L NC. Alert, oriented. Pain control issues over night. Improved w/ Epidural bolus, an increase in epidural rate to 14cc/hr and 1x dose of 0.5 mg IV dilaudud in addition to dilaudid PCA at 0.2 q10min and scheduled gabapentin and tyelonol, warm pack and Abdominal binder. Denies nausea. Abdominal incision CDI. Belching but no gas. Denies BM. Chatterjee removed, voiding spont. Left leg dressing intact w/ small drainage, pt refused dressing change over night, requested dressing be changed in morning. Left CVC infusing w/ PCA and MIV. Left PIV saline locked. Up w/ Assist of 1. On Regular diet. Continue w/ POC.

## 2017-07-09 NOTE — PROGRESS NOTES
GYN ONC PROGRESS NOTE  07/09/17    HD#:8 /POD#:2 XL, HELENE, LSO, omentectomy, evacuation of abdominal fluid, PPALND  Disease: Stage 1C borderline ovarian tumor    24 hour events:   - Poor pain control, gabapentin added    Subjective: Improved pain control with IV Dilaudid push and gabapentin.  Denies nausea, denies vomiting.  Passed small flatus flatus.  Denies chest pain or shortness of breath.      Objective:   Vitals:    07/09/17 0450 07/09/17 0455 07/09/17 0519 07/09/17 0649   BP: 107/72 134/79  113/71   BP Location:  Right arm  Right arm   Pulse:   103 96   Resp:   12 18   Temp:    97.7  F (36.5  C)   TempSrc:    Oral   SpO2:  92% 93% 93%   Weight:       Height:             Gen- no distress, tired  CV- Regular rate and rhythm, No murmurs, rubs or gallops  Pulm- Normal - Clear to auscultation without rales, rhonchi, or wheezing. and bilateral air exchange present  Abd- soft, appropriately tender, non distended  Incision- dry and intact  Extr- Normal and 1+ edema    I/Os  I/O last 3 completed shifts:  In: 3440 [P.O.:440; I.V.:3000]  Out: 1375 [Urine:1375]    New Labs/Imaging-     Results for orders placed or performed during the hospital encounter of 07/02/17 (from the past 24 hour(s))   Glucose by meter   Result Value Ref Range    Glucose 127 (H) 70 - 99 mg/dL   Glucose by meter   Result Value Ref Range    Glucose 165 (H) 70 - 99 mg/dL   Glucose by meter   Result Value Ref Range    Glucose 133 (H) 70 - 99 mg/dL   Glucose by meter   Result Value Ref Range    Glucose 139 (H) 70 - 99 mg/dL     Assessment: Ms. Corona is a 62 year old female with a borderline ovarian tumor that is POD#2 from a XL, HELENE, LSO, omentectomy, evacuation of abdominal fluid, PPALND, postoperative course complicated by poor pain control, improving.     Active Problem list:    - postoperative state  - borderline ovarian tumor  - DVT  - anemia  - HTN  - Cellulitis  - T2DM    FEN:   - Regular diet    Pain:   - Tylenol. Dilaudid PCA & epidural to be  discontinued today.  Gabapentin added last night as adjunct.  Will transition to PO pain meds.    Heme:   - Recent diagnosis on 6/5 of unprovoked LLE DVT, on Xarelto per hematology, held 24 hours prior to surgery, plan to restart after epidural removal today.  Pt received one dose of Lovenox 40mg yesterday.   - Chronic anemia, Hgb 9.6 > 8.6 (likely hemodilution), will continue to monitor     CV:   - HTN, HLD. home Lisinopril held as blood pressures have been low-normal.  Continue to monitor.    Pulm:   - No prior hx of lung dz, however pt with SOB and wheezing on exam. Advair inhaler ordered per primary team, non-concerning CXR    GI: NI  :  - Chatterjee catheter in place, removed last night once patient had better pain control.  Pt has voided    ID:   - Cellulitis: Pt has L thigh cellulitis 2/2 dog bite, febrile on admission. s/p IV Vanco/Zosyn. >24hr afebrile.  Currently on Augmentin, d/c today for 7 days total antibiotics.    Endocrine:   - T2DM. Medium SSI. Metformin held.  Blood glucose mildly elevated yesterday, continue to monitor closely.    Psych/Neuro:   - anxiety/depression, on home Lexapro    PPX:   - SCD R leg    Disposition: pending postoperative course and pain control    Estefania Hayden MD  PGY-2 OBGYN  Gynecologic Oncology service pager # [phone]

## 2017-07-09 NOTE — PROGRESS NOTES
" 07/09/17 1000   Quick Adds   Type of Visit Initial PT Evaluation  (Airam Miller, PT, DPT )       Present no   Language English    Living Environment   Lives With alone   Living Arrangements other (see comments)  (duplex)   Home Accessibility bed and bath on same level;stairs to enter home;stairs (2 railings present)  (laundry in basement (full flight - L side handrail))   Number of Stairs to Enter Home 5   Number of Stairs Within Home 12  (full flight to basement )   Stair Railings at Home outside, present at both sides;inside, present on left side   Transportation Available car   Living Environment Comment Pt lives alone in single story duplex with basement (laundry located in basement, full flight down with handrail), [address]  home. Pt lives with two \"big\" dogs and a cat. Pt notes that home is messy and that dogs remain home alone for long hours dt her work schedule so she is required to  \"messes\". Pt reporting minimal assist available with brothers living in [place] and friends assisting their families.    Self-Care   Dominant Hand right   Usual Activity Tolerance good   Current Activity Tolerance fair   Regular Exercise no   Equipment Currently Used at Home none   Activity/Exercise/Self-Care Comment Pt denies regular exercise - reports walking dogs at times. Pt was working FT PTA. Indep with mobility    Functional Level Prior   Ambulation 0-->independent   Transferring 0-->independent   Toileting 0-->independent   Bathing 0-->independent   Dressing 0-->independent   Eating 0-->independent   Communication 0-->understands/communicates without difficulty   Swallowing 0-->swallows foods/liquids without difficulty   Cognition 0 - no cognition issues reported   Fall history within last six months no   Which of the above functional risks had a recent onset or change? ambulation;transferring   Prior Functional Level Comment indep with mobility and ADLs prior to admit   General Information   Onset " of Illness/Injury or Date of Surgery - Date 07/02/17  (hospital admit )   Referring Physician Estefania Hayden MD   Patient/Family Goals Statement none stated   Pertinent History of Current Problem (include personal factors and/or comorbidities that impact the POC) 62 year old female with h/o right salpingo-oopherectomy (2007), DVT (diagnosed 6/5/2017) on xarelto, DM2, HLD, admitted for evaluation of pelvic mass on CT concerning for gyn malignancy, on OR today with gyn/onc for both diagnosis and treatment. Also managing LUE abscess s/p I&D 7/2. Is now S/p exploratory laparotomy, HELENE,Left salpingo-oophorectomy.    Precautions/Limitations fall precautions   Weight-Bearing Status - LUE full weight-bearing   Weight-Bearing Status - RUE full weight-bearing   Weight-Bearing Status - LLE full weight-bearing   Weight-Bearing Status - RLE full weight-bearing   General Info Comments activity: up with assist    Cognitive Status Examination   Orientation orientation to person, place and time   Level of Consciousness alert   Follows Commands and Answers Questions 100% of the time   Personal Safety and Judgment intact   Memory intact   Pain Assessment   Patient Currently in Pain Yes, see Vital Sign flowsheet   Integumentary/Edema   Integumentary/Edema no deficits were identifed   Posture    Posture Not impaired   Range of Motion (ROM)   ROM Comment WFL   Strength   Strength Comments at least 3/5    Bed Mobility   Bed Mobility Comments mod I - utilized bed rail for rolling    Transfer Skills   Transfer Comments required HOB to be slightly elevated and use of bedrail for UE support to perform supine<>sit. Relies heavily on UE for STS transfers    Gait   Gait Comments dec gait speed and step length jin, utilized IV pole for support, limited by pain    Balance   Balance Comments Not formally assessed, no overt LOB    Sensory Examination   Sensory Perception no deficits were identified   Coordination   Coordination no deficits were  "identified   Muscle Tone   Muscle Tone no deficits were identified   General Therapy Interventions   Planned Therapy Interventions balance training;gait training;neuromuscular re-education;strengthening;transfer training;progressive activity/exercise;home program guidelines   Clinical Impression   Criteria for Skilled Therapeutic Intervention yes, treatment indicated   PT Diagnosis Dec functional mobility   Influenced by the following impairments pain, dec strength, dec activity tolerance    Functional limitations due to impairments gait, transfers, balance, bed mobility, edurance    Clinical Presentation Stable/Uncomplicated   Clinical Presentation Rationale clinical judgement    Clinical Decision Making (Complexity) Low complexity   Therapy Frequency` 5 times/week   Predicted Duration of Therapy Intervention (days/wks) 7/16/17   Anticipated Equipment Needs at Discharge (TBD)   Anticipated Discharge Disposition Transitional Care Facility   Risk & Benefits of therapy have been explained Yes   Patient, Family & other staff in agreement with plan of care Yes   Clinical Impression Comments PT eval completed, tx inditiated    [facility] AM-PAC TM \"6 Clicks\"   2016, Trustees of [facility], under license to [employer].  All rights reserved.   6 Clicks Short Forms Basic Mobility Inpatient Short Form   [facility] AM-PAC  \"6 Clicks\" V.2 Basic Mobility Inpatient Short Form   1. Turning from your back to your side while in a flat bed without using bedrails? 4 - None   2. Moving from lying on your back to sitting on the side of a flat bed without using bedrails? 3 - A Little   3. Moving to and from a bed to a chair (including a wheelchair)? 4 - None   4. Standing up from a chair using your arms (e.g., wheelchair, or bedside chair)? 4 - None   5. To walk in hospital room? 4 - None   6. Climbing 3-5 steps with a railing? 2 - A Lot   Basic Mobility Raw Score (Score out of 24.Lower scores equate to lower " levels of function) 21   Total Evaluation Time   Total Evaluation Time (Minutes) 6

## 2017-07-10 ENCOUNTER — APPOINTMENT (OUTPATIENT)
Dept: PHYSICAL THERAPY | Facility: CLINIC | Age: 62
DRG: 737 | End: 2017-07-10
Payer: COMMERCIAL

## 2017-07-10 LAB
ANION GAP SERPL CALCULATED.3IONS-SCNC: 3 MMOL/L (ref 3–14)
BUN SERPL-MCNC: 18 MG/DL (ref 7–30)
CALCIUM SERPL-MCNC: 8.6 MG/DL (ref 8.5–10.1)
CHLORIDE SERPL-SCNC: 106 MMOL/L (ref 94–109)
CO2 SERPL-SCNC: 32 MMOL/L (ref 20–32)
CREAT SERPL-MCNC: 1.17 MG/DL (ref 0.52–1.04)
ERYTHROCYTE [DISTWIDTH] IN BLOOD BY AUTOMATED COUNT: 15 % (ref 10–15)
GFR SERPL CREATININE-BSD FRML MDRD: 47 ML/MIN/1.7M2
GLUCOSE BLDC GLUCOMTR-MCNC: 103 MG/DL (ref 70–99)
GLUCOSE BLDC GLUCOMTR-MCNC: 117 MG/DL (ref 70–99)
GLUCOSE BLDC GLUCOMTR-MCNC: 123 MG/DL (ref 70–99)
GLUCOSE BLDC GLUCOMTR-MCNC: 145 MG/DL (ref 70–99)
GLUCOSE SERPL-MCNC: 108 MG/DL (ref 70–99)
HCT VFR BLD AUTO: 24.8 % (ref 35–47)
HGB BLD-MCNC: 7.6 G/DL (ref 11.7–15.7)
MCH RBC QN AUTO: 27.1 PG (ref 26.5–33)
MCHC RBC AUTO-ENTMCNC: 30.6 G/DL (ref 31.5–36.5)
MCV RBC AUTO: 89 FL (ref 78–100)
PLATELET # BLD AUTO: 343 10E9/L (ref 150–450)
POTASSIUM SERPL-SCNC: 4 MMOL/L (ref 3.4–5.3)
RBC # BLD AUTO: 2.8 10E12/L (ref 3.8–5.2)
SODIUM SERPL-SCNC: 141 MMOL/L (ref 133–144)
WBC # BLD AUTO: 8.7 10E9/L (ref 4–11)

## 2017-07-10 PROCEDURE — 99024 POSTOP FOLLOW-UP VISIT: CPT | Mod: GC | Performed by: OBSTETRICS & GYNECOLOGY

## 2017-07-10 PROCEDURE — 25000132 ZZH RX MED GY IP 250 OP 250 PS 637: Performed by: FAMILY MEDICINE

## 2017-07-10 PROCEDURE — 25000128 H RX IP 250 OP 636: Performed by: HOSPITALIST

## 2017-07-10 PROCEDURE — 97110 THERAPEUTIC EXERCISES: CPT | Mod: GP

## 2017-07-10 PROCEDURE — 97116 GAIT TRAINING THERAPY: CPT | Mod: GP

## 2017-07-10 PROCEDURE — 36592 COLLECT BLOOD FROM PICC: CPT | Performed by: STUDENT IN AN ORGANIZED HEALTH CARE EDUCATION/TRAINING PROGRAM

## 2017-07-10 PROCEDURE — 25000125 ZZHC RX 250: Performed by: STUDENT IN AN ORGANIZED HEALTH CARE EDUCATION/TRAINING PROGRAM

## 2017-07-10 PROCEDURE — 97530 THERAPEUTIC ACTIVITIES: CPT | Mod: GP

## 2017-07-10 PROCEDURE — 25000132 ZZH RX MED GY IP 250 OP 250 PS 637: Performed by: HOSPITALIST

## 2017-07-10 PROCEDURE — 85027 COMPLETE CBC AUTOMATED: CPT | Performed by: STUDENT IN AN ORGANIZED HEALTH CARE EDUCATION/TRAINING PROGRAM

## 2017-07-10 PROCEDURE — 25000132 ZZH RX MED GY IP 250 OP 250 PS 637: Performed by: OBSTETRICS & GYNECOLOGY

## 2017-07-10 PROCEDURE — 12000008 ZZH R&B INTERMEDIATE UMMC

## 2017-07-10 PROCEDURE — 40000193 ZZH STATISTIC PT WARD VISIT

## 2017-07-10 PROCEDURE — 00000146 ZZHCL STATISTIC GLUCOSE BY METER IP

## 2017-07-10 PROCEDURE — 25000132 ZZH RX MED GY IP 250 OP 250 PS 637: Performed by: STUDENT IN AN ORGANIZED HEALTH CARE EDUCATION/TRAINING PROGRAM

## 2017-07-10 PROCEDURE — 80048 BASIC METABOLIC PNL TOTAL CA: CPT | Performed by: STUDENT IN AN ORGANIZED HEALTH CARE EDUCATION/TRAINING PROGRAM

## 2017-07-10 RX ADMIN — MULTIPLE VITAMINS W/ MINERALS TAB 1 TABLET: TAB at 08:10

## 2017-07-10 RX ADMIN — ATORVASTATIN CALCIUM 80 MG: 80 TABLET, FILM COATED ORAL at 20:35

## 2017-07-10 RX ADMIN — HYDROMORPHONE HYDROCHLORIDE 4 MG: 2 TABLET ORAL at 00:54

## 2017-07-10 RX ADMIN — HYDROMORPHONE HYDROCHLORIDE 2 MG: 2 TABLET ORAL at 20:30

## 2017-07-10 RX ADMIN — B-COMPLEX W/ C & FOLIC ACID TAB 1 TABLET: TAB at 08:10

## 2017-07-10 RX ADMIN — VITAMIN D, TAB 1000IU (100/BT) 2000 UNITS: 25 TAB at 08:11

## 2017-07-10 RX ADMIN — HYDROMORPHONE HYDROCHLORIDE 2 MG: 2 TABLET ORAL at 21:40

## 2017-07-10 RX ADMIN — POLYETHYLENE GLYCOL 3350 17 G: 17 POWDER, FOR SOLUTION ORAL at 20:34

## 2017-07-10 RX ADMIN — ACETAMINOPHEN 650 MG: 325 TABLET, FILM COATED ORAL at 17:25

## 2017-07-10 RX ADMIN — ACETAMINOPHEN 650 MG: 325 TABLET, FILM COATED ORAL at 10:24

## 2017-07-10 RX ADMIN — RIVAROXABAN 20 MG: 20 TABLET, FILM COATED ORAL at 20:35

## 2017-07-10 RX ADMIN — SODIUM CHLORIDE, POTASSIUM CHLORIDE, SODIUM LACTATE AND CALCIUM CHLORIDE: 600; 310; 30; 20 INJECTION, SOLUTION INTRAVENOUS at 01:12

## 2017-07-10 RX ADMIN — POLYETHYLENE GLYCOL 3350 17 G: 17 POWDER, FOR SOLUTION ORAL at 08:09

## 2017-07-10 RX ADMIN — HYDROMORPHONE HYDROCHLORIDE 2 MG: 2 TABLET ORAL at 15:43

## 2017-07-10 RX ADMIN — SENNOSIDES AND DOCUSATE SODIUM 2 TABLET: 8.6; 5 TABLET ORAL at 20:35

## 2017-07-10 RX ADMIN — ACETAMINOPHEN 650 MG: 325 TABLET, FILM COATED ORAL at 04:59

## 2017-07-10 RX ADMIN — ESCITALOPRAM OXALATE 20 MG: 20 TABLET ORAL at 08:11

## 2017-07-10 RX ADMIN — SENNOSIDES AND DOCUSATE SODIUM 2 TABLET: 8.6; 5 TABLET ORAL at 08:10

## 2017-07-10 RX ADMIN — HYDROMORPHONE HYDROCHLORIDE 2 MG: 2 TABLET ORAL at 17:25

## 2017-07-10 RX ADMIN — FLUTICASONE PROPIONATE 2 SPRAY: 50 SPRAY, METERED NASAL at 08:11

## 2017-07-10 RX ADMIN — HYDROMORPHONE HYDROCHLORIDE 2 MG: 2 TABLET ORAL at 13:13

## 2017-07-10 RX ADMIN — HYDROMORPHONE HYDROCHLORIDE 4 MG: 2 TABLET ORAL at 04:59

## 2017-07-10 RX ADMIN — AMOXICILLIN AND CLAVULANATE POTASSIUM 1 TABLET: 875; 125 TABLET, FILM COATED ORAL at 20:35

## 2017-07-10 RX ADMIN — HYDROMORPHONE HYDROCHLORIDE 4 MG: 2 TABLET ORAL at 08:12

## 2017-07-10 RX ADMIN — AMOXICILLIN AND CLAVULANATE POTASSIUM 1 TABLET: 875; 125 TABLET, FILM COATED ORAL at 08:10

## 2017-07-10 ASSESSMENT — PAIN DESCRIPTION - DESCRIPTORS
DESCRIPTORS: SORE
DESCRIPTORS: SORE

## 2017-07-10 NOTE — PLAN OF CARE
Problem: Goal Outcome Summary  Goal: Goal Outcome Summary  Outcome: No Change  VSS. Pain controlled with scheduled Tylenol and PRN PO Dilaudid. Denies nausea. BG WDL, no insulin needed. Denies gas. Left leg dressing CDI, needs to be changed this evening. Midline dermabond CDI. Up with PT, completed walking O2 sats. Pt somewhat drowsy today, states she didn't get much sleep last night. Plan to DC tomorrow to TCU. Continue to monitor.

## 2017-07-10 NOTE — PROGRESS NOTES
GYN ONC PROGRESS NOTE  07/10/17    HD#:9 /POD#:3 XL, HELENE, LSO, omentectomy, evacuation of abdominal fluid, PPALND  Disease: Stage 1C borderline ovarian tumor    24 hour events:   - Epidural removed  - PCA discontinued  - PT evaluation: recommend discharge to TCU   - Xarelto restarted    Subjective: Patient is feeling ok this morning.  Pain is controlled with pain medications.  Eating and drinking without nausea or vomiting.  Denies flatus or BM.  Voiding spontaneously. Denies chest pain, SOB or dizziness.     Objective:   Vitals:    07/09/17 1952 07/09/17 2158 07/10/17 0059 07/10/17 0411   BP: 114/68 121/76     BP Location: Right arm Right arm     Pulse: 98 100     Resp: 16 15     Temp: 98.8  F (37.1  C) 100.1  F (37.8  C) 98.2  F (36.8  C)    TempSrc: Oral Oral Oral    SpO2: 96% 96% 95% 94%   Weight:       Height:             Gen- alert, no distress, cooperative  CV- Regular rate and rhythm, no murmur present  Pulm- Normal - Clear to auscultation without rales, rhonchi, or wheezing. and bilateral air exchange present  Abd- soft, appropriately tender, non distended  Abd- soft, appropriately tender, non distended  Incision- dry and intact  Extr- 1+ LE edema, SCD in place on Rt leg, bite on left leg is covered by a bandage which is dry but has some shadowing    I/Os  (Yesterday // Since Midnight)  PO 1230cc // 0cc  IVF 3000cc // 0cc  Urine 875cc // 520cc    New Labs/Imaging-   Results for orders placed or performed during the hospital encounter of 07/02/17 (from the past 24 hour(s))   Glucose by meter   Result Value Ref Range    Glucose 182 (H) 70 - 99 mg/dL   Glucose by meter   Result Value Ref Range    Glucose 134 (H) 70 - 99 mg/dL   Glucose by meter   Result Value Ref Range    Glucose 119 (H) 70 - 99 mg/dL   Glucose by meter   Result Value Ref Range    Glucose 118 (H) 70 - 99 mg/dL   Glucose by meter   Result Value Ref Range    Glucose 123 (H) 70 - 99 mg/dL       Pending cultures (date obtained):  None    Assessment: Ms. Corona is a 62 year old female with a borderline ovarian tumor that is POD#3 from a XL, HELENE, LSO, omentectomy, evacuation of abdominal fluid, PPALND, postoperative course complicated by poor pain control, improving.      Active Problem list:    - postoperative state  - borderline ovarian tumor  - DVT  - anemia  - HTN  - Cellulitis  - T2DM     FEN:   - Regular diet     Pain:   - Scheduled tylenol and gabapentin. PRN PO dilaudid.      Heme:   - Recent diagnosis on 6/5 of unprovoked LLE DVT, on Xarelto per hematology.    - Chronic anemia, Hgb 9.6 > 8.6>7.6, hemodilution vs slow intraabdominal bleeding. Will continue to monitor, transfuse if Hgb <7.      CV:   - HTN, HLD. home Lisinopril held as blood pressures have been low-normal.  Continue to monitor.     Pulm:   - No prior hx of lung dz, however pt with SOB and wheezing on exam. CXR WNL on admission. Requiring 1-2L oxygen to maintain gurpreet >90% since surgery. Consider atelectasis vs mild pulmonary edema 2/2 fluid administration vs PE. Encourage IS. No indication for CTA as pt is already on anticoagulation. Consider CXR if increasing O2 requirements or pt develops fever.      GI:   - NI    :  - Chatterjee catheter in place, removed last night once patient had better pain control.  Pt voiding spontaneously.      ID:   - Cellulitis: Pt has L thigh cellulitis 2/2 dog bite, febrile on admission. s/p IV Vanco/Zosyn. >24hr afebrile. Today is D#7 of 7 days Augmentin. [facility] nurse to evaluate.      Endocrine:   - T2DM. Medium SSI. Metformin held.  Blood glucose mildly elevated, continue to monitor closely.     Psych/Neuro:   - anxiety/depression, on home Lexapro     PPX:   - SCD R leg, Xarelto as above.      Disposition: Possible d/c to TCU in 1-2 days.     Corry Segura MD  Ob/Gyn PGY-1  7/10/2017 9:14 AM     Pager [phone]       The patient was seen and examined with the resident, the labs and vital signs were reviewed.The medical care outlined above  was provided under my directives and direct supervision.    Laura Pack MD, MPH  Gynecology Oncology

## 2017-07-10 NOTE — PLAN OF CARE
Problem: Goal Outcome Summary  Goal: Goal Outcome Summary  Outcome: No Change  Per patient pain is well controlled, Oxycodone discontinued per pt request, new order for PO Dilaudid, Epidural discontinued at 1630. Patient denies nausea, tolerating Regular diet, blood sugar 119, central line infusing, left leg wound dressing is c/d/i, abdominal binder in place. Patient is voiding and ambulating without difficulty, reports positive flatus, no BM. Call light in reach.

## 2017-07-10 NOTE — DISCHARGE SUMMARY
Gynecologic Oncology Discharge Summary    Hafsa Corona  [id number]    Admit Date: 7/2/2017  Discharge Date: 7/11/2017  Admitting Provider: Dr. Cole  Discharge Provider: Dr. 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  - LLE DVT  - T2DM  - LLE cellulitis 2/2 dog bite  - Acute on chronic anemia  - HTN    Patient Active Problem List   Diagnosis     Attention deficit disorder     PANIC DISORDER      VASOMOTOR RHINITIS     Chronic Maxillary Sinusitis     Tachycardia     Type 2 diabetes mellitus without complication (H)     HYPERLIPIDEMIA LDL GOAL <100     Allergic rhinitis due to other allergen     BMI > 35     Essential hypertension     Lumbago     Major depressive disorder, recurrent episode, mild (H)     Long-term (current) use of anticoagulants [Z79.01]     Deep vein thrombosis (DVT) (H) [I82.409]     Pelvic mass in female     S/P laparotomy       Procedures: XL, HELENE, LSO, omentectomy, evacuation of abdominal fluid, PPALND    Prior to Admission Medications:  Prescriptions Prior to Admission   Medication Sig Dispense Refill Last Dose     Multiple Vitamins-Minerals (MULTIVITAMIN ADULT PO) Take 1 tablet by mouth daily   7/2/2017 at unknown     vitamin B complex with vitamin C (VITAMIN  B COMPLEX) TABS tablet Take 1 tablet by mouth daily   Past Week at Unknown time     Lisdexamfetamine Dimesylate (VYVANSE PO) Take 50 mg by mouth daily   hasnt started yet at hasnt started yet     rivaroxaban ANTICOAGULANT (XARELTO) 20 MG TABS tablet Take 1 tablet (20 mg) by mouth daily (with dinner) 30 tablet 3 7/1/2017 at Unknown time     Digestive Enzymes (DIGESTIVE ENZYME PO) Take 1 capsule by mouth as needed    Past Week at Unknown time     CALCIUM-MAGNESIUM-VITAMIN D PO Take 2 tablets by mouth daily   7/2/2017 at Unknown time     Probiotic Product (PRO-BIOTIC BLEND PO) Take 1 capsule by  mouth daily Enzymatic Therapy-probiotic pearls   7/2/2017 at Unknown time     atorvastatin (LIPITOR) 80 MG tablet Take 1 tablet (80 mg) by mouth daily 90 tablet PRN 7/1/2017 at Unknown time     lisinopril (PRINIVIL/ZESTRIL) 10 MG tablet Take 1 tablet (10 mg) by mouth daily 90 tablet PRN 7/2/2017 at Unknown time     metFORMIN (GLUCOPHAGE-XR) 500 MG 24 hr tablet Take two tabs by mouth once daily with evening meal. 180 tablet PRN 7/1/2017 at Unknown time     escitalopram (LEXAPRO) 20 MG tablet Take 1 tablet (20 mg) by mouth daily 30 tablet PRN 7/2/2017 at unknown     fluticasone (FLONASE) 50 MCG/ACT nasal spray Spray 2 sprays into both nostrils daily 48 g 1 Past Week at Unknown time     Cholecalciferol (VITAMIN D) 1000 UNIT capsule Take 2,000 Units by mouth daily    7/1/2017 at Unknown time     traMADol (ULTRAM) 50 MG tablet Take 1 tablet (50 mg) by mouth every 6 hours as needed for pain maximum 4 tablet(s) per day 20 tablet 0 hasnt started yet at hasnt started yet     order for DME Compression stockings 20-30 mm Hg. To be wear when directed by Hematology team and while awake for the first two years post clot. 1 each 0 Taking     order for DME Equipment being ordered: blood pressure monitor 1 Units 0 Taking     EPINEPHrine (EPIPEN) 0.3 MG/0.3ML injection Inject 0.3 mLs (0.3 mg) into the muscle once as needed for anaphylaxis 1 each PRN never used at never used     albuterol (ALBUTEROL) 108 (90 BASE) MCG/ACT inhaler Inhale 2 puffs into the lungs 4 times daily as needed for shortness of breath / dyspnea 1 Inhaler PRN More than a month at Unknown time       Discharge Medications:   Hafsa Corona   Home Medication Instructions AMIE:[id number]    Printed on:07/11/17 8827   Medication Information                      acetaminophen (TYLENOL) 325 MG tablet  Take 2 tablets (650 mg) by mouth every 6 hours as needed for mild pain             albuterol (ALBUTEROL) 108 (90 BASE) MCG/ACT inhaler  Inhale 2 puffs into the lungs 4  times daily as needed for shortness of breath / dyspnea             atorvastatin (LIPITOR) 80 MG tablet  Take 1 tablet (80 mg) by mouth daily             CALCIUM-MAGNESIUM-VITAMIN D PO  Take 2 tablets by mouth daily             Cholecalciferol (VITAMIN D) 1000 UNIT capsule  Take 2,000 Units by mouth daily              Digestive Enzymes (DIGESTIVE ENZYME PO)  Take 1 capsule by mouth as needed              EPINEPHrine (EPIPEN) 0.3 MG/0.3ML injection  Inject 0.3 mLs (0.3 mg) into the muscle once as needed for anaphylaxis             escitalopram (LEXAPRO) 20 MG tablet  Take 1 tablet (20 mg) by mouth daily             fluticasone (FLONASE) 50 MCG/ACT nasal spray  Spray 2 sprays into both nostrils daily             HYDROmorphone (DILAUDID) 4 MG tablet  Take 1-1.5 tablets (4-6 mg) by mouth every 3 hours as needed for moderate to severe pain             Lisdexamfetamine Dimesylate (VYVANSE PO)  Take 50 mg by mouth daily             lisinopril (PRINIVIL/ZESTRIL) 10 MG tablet  Take 1 tablet (10 mg) by mouth daily             metFORMIN (GLUCOPHAGE-XR) 500 MG 24 hr tablet  Take two tabs by mouth once daily with evening meal.             Multiple Vitamins-Minerals (MULTIVITAMIN ADULT PO)  Take 1 tablet by mouth daily             order for DME  Equipment being ordered: blood pressure monitor             order for DME  Compression stockings 20-30 mm Hg. To be wear when directed by Hematology team and while awake for the first two years post clot.             polyethylene glycol (MIRALAX/GLYCOLAX) Packet  Take 17 g by mouth daily             Probiotic Product (PRO-BIOTIC BLEND PO)  Take 1 capsule by mouth daily Enzymatic Therapy-probiotic pearls             rivaroxaban ANTICOAGULANT (XARELTO) 20 MG TABS tablet  Take 1 tablet (20 mg) by mouth daily (with dinner)             senna-docusate (SENOKOT-S;PERICOLACE) 8.6-50 MG per tablet  Take 2 tablets by mouth 2 times daily Start with 1 tablet PO BID, If no bowel movement in 24 hours,  increase to 2 tablets PO BID.  Hold for loose stools.             vitamin B complex with vitamin C (VITAMIN  B COMPLEX) TABS tablet  Take 1 tablet by mouth daily                  Consultations:  Medicine primary HD#1-HD#6  Physical therapy  Social work   Wound care nurse  Lymphedema nurse    Brief History of Illness:  Hafsa Corona is a 62 year old female who presented on 7/3/17 with abdominal pain and distention, history of left LE DVT diagnosed in early June 2017, and recent dog bite on upper left thigh complicated by cellulitis and abscess formation. Shortly before the current admission she was found to have a large pelvic mass with  of 1187. She has a history of RSO in 2007. She was followed and managed by family medicine from HD1- HD6. Pelvic mass was removed by Gyn Onc on HD6. She remained on the gyn onc service for her post operative care.    Hospital Course:  Dz: Stage 1C borderline ovarian tumor    - Preoperative diagnosis was left ovarian mass confirmed by CT.  Frozen section at the time of the surgery showed borderline ovarian tumor that had ruptured prior to surgery.  Final pathology is pending at the time of discharge.  She will follow-up postoperatively for a care plan.  FEN:   - She was maintained on IVF until POD#1, when her diet was slowly advanced.  By discharge, she was tolerating a regular diet without nausea and vomiting and able to maintain her hydration without IVF supplementation.  Pain:   - Postoperative pain was initially managed with bupivacaine epidural, IV dilaudid, and Tylenol. Dilaudid PCA was started on POD#0 given poor pain control with the above interventions. Gabapentin was added on POD#2. On POD#3 patient transitioned to PO pain medications. Her pain was well controlled on this and she was discharged home with these medications.  CV:   - She has a history of HTN & HLD managed with lisinopril and pravastatin respectively, these were continued throughout admission. She had mild  tachycardia in the immediate postoperative period, this resolved with fluids. She had no acute CV issues.  PULM:   - She has no history of pulmonary issues, although pt was noted to have shortness of breath and wheezing on admission that was managed with prn albuterol. CXR was unremarkable on admission. She was given O2 supplementation in the postoperative period. On POD#3 pt underwent ambulating oxygen assessment with PT and was noted to desaturate below 90% on room air. She will be discharged to the TCU with supplemental oxygen and will follow up as an outpatient for further workup and management. She was encouraged to use her bedside IS while in house  HEME:   - Her preoperative Hgb was 9.4.  Her hgb was 9.5 postoperatively and stabilized at 8.4. Pt has known left lower extremity DVT diagnosed on 6/5/17, managed with Xarelto per hematology. This was discontinued 24 hours before the procedure and restarted on POD#2 after epidural removal. She will follow up with her hematologist for further management of her DVT. IR was consulted preoperatively for IVC filter placement, however pt was not a candidate given venous compression by the tumor.   GI:   - She was made NPO prior to the procedure.  On POD#0, her diet was advanced to clear liquids and then advanced slowly as tolerated.  At the time of discharge, she was tolerating a regular diet without nausea and vomiting.  She was started on miralax and senna-docusate post op will be discharged with a bowel regimen to prevent constipation in the postoperative period.  She had no acute GI issues while in house.  :    - A rand catheter was placed at the time of the surgery.  Once ambulating unassisted, the rand catheter was removed.  Prior to discharge, the patient was voiding spontaneously without difficulty. Pt was noted to have urinary incontinence on POD#2. UA was obtained, which was negative for infection, and PVR was 0 mL. She had no acute  issues while in  house.   ID:   - Pt diagnosed with left thigh cellulitis with abscess formation 2/2 dog bite on admission. She underwent I&D in the ED. This was initially managed with IV Zosyn and Vancomycin per family medicine. She was transitioned to PO Augmentin for a total of 7 day course. This was completed on 7/10/17. The wound was packed and dressed in clean bandages throughout admission and she was seen by [facility] nurse. Pt was initially febrile on presentation to the ED, but this resolved with initiation of IV antibiotics.   ENDO:   - History of T2DM, managed with Metformin. This was discontinued throughout the admission. She was put on medium SSI for management of her blood sugars during her hospital stay with good control. Metformin will be restarted on discharge.   MSK:   - Pt developed bilateral lower extremity edema in the postoperative period. Noted to be net positive 3L during the admission. Lymphedema nurse was consulted for lower extremity wraps for patient comfort.   PSYCH/NEURO:   - History of depression/anxiety, home lexapro was continued throughout admission. She was seen by health psychology during her admission.   PPX:    - Xarelto was continued throughout admission as noted above. She was given RLE SCD and IS during her hospital course.  She tolerated these prophylactic interventions without incident.      Discharge Instructions and Follow up:  Ms. Hafsa Corona was discharged from the hospital with follow up with Dr. Shaffer in 2 weeks to discuss final pathology and further treatment planning.    Discharge Diet: Regular  Discharge Activity: Activity as tolerated  Discharge Follow up: In 2 weeks as above    Discharge Disposition:  Discharged to rehabilitation facility    Discharge Staff: Dr. Sirena Segura MD  Ob/gyn PGY-1  07/11/17 9:50 AM     The patient was seen and examined with the resident, she is ready for discharge. The discharge  care plan above was provided under my directives and direct  supervision.    Laura Pack MD, MPH  Gynecology Oncology

## 2017-07-10 NOTE — PROGRESS NOTES
Social Work Services Progress Note    Hospital Day: 9  Date of Initial Social Work Evaluation:  07/05/17  Collaborated with:  Pt, pt's brother (Phil: [phone]), medical team/NP Concepcion    Data:  Pt medically ready to d/c tomorrow and in need of TCU    Intervention:  SW met with pt. Pt gave SW permission to speak with pt's brother, Phil. Pt called [facility] to inquire about TCU coverage while SW was in the room. Pt asked SW to refer to [facility], [facility], and [facility].    SW left VM and faxed referral to [facility] (p: [phone]; f: [phone]) and [facility] (p: [phone]; f: [phone]). SW asked [facility] to follow.  SW spoke with pt's brother, Phil, and updated with d/c plan. Phil stated that he will be coming into town in the next couple days.  SW received PC from [facility] stating that they will call in the morning if they can admit pt.    Assessment:  Pt in agreement to d/c to TCU.     Plan:    Anticipated Disposition:  Facility:  TCU (D)    Barriers to d/c plan:  Medical stability    Follow Up:  SW will continue to follow to assist with d/c plan.    MARY Marie, LGSW  7C Surgical Oncology Unit  Phone: [phone]  Pager: [phone]

## 2017-07-10 NOTE — CONSULTS
"    Health Psychology                  Clinic    Department of Medicine  Alecia Mina, Ph.D., L.P. [phone]                          [facility]  [facility] Casimiro Renae, Ph.D.,  L.P. [phone]                 3rd Floor  [place] [address]   Darwin Linton, Ph.D., KAUR., L.P. [phone]     [address] Fiorella Han, Ph.D., L.P. [phone]            [place] [place]  [place] [address]           Christine Kebede, Ph.D., L.P. [phone]     Inpatient Health Psychology Consultation*    DATE OF SERVICE:  07/10/2017    REFERRAL SOURCE:  Eli Vick MD, [facility] team, [address], [facility].    Clinical Information:  Met with patient per her request to provide emotional support  following her surgery Friday. She reports that once pain got under control that things have felt much better. Notes that it felt as if she needed to be a very strong advocate for herself re the pain management as the recommended approaches were not working well for her, including epidural and then shift to oral medication that was not as effective as needed. Now feeling that pain is under control and has an appetite, able to be up and active, walking in the halls, working with PT. Feels that she is doing well emotionally in general, allowing herself to \"just be\" and let her body rest and recover as much as possible. Feels very well supported by providers and staff.   We talked about the importance of putting attention on what she can and cannot control and making the choice to use her energy for the things that she has the possibility of changing. She talked about the shifts in dynamics with her siblings and how she appreciates that a crisis like this can provide an opportunity for these kinds of shifts. Working with SW on TCU placement. Appreciative of recommendation for referral to community " provider. Asked for my contact information and this was provided. Affect euthymic.  Assessment: Doing very well emotionally in context of recent diagnosis of mass, surgery, uncertainty of specific diagnosis, and history of anxiety and depression. Appears remarkably calm and grounded, staying in present, appreciative of support and care. Asking clear questions to gain information needed to continue managing emotional reactions effectively.  Diagnosis:    1.  Dysthymic disorder (F34.1) (based on information from psychiatrist found in Care Everywhere).  2.  Major depressive disorder, single episode, moderate (F32.1).    3.  Generalized anxiety disorder (F41.1).  4.  Attention deficit disorder without mention of hyperactivity (by history).  5.  History of panic disorder.   Recommendations/Plan: Ms Corona will make contact with community provider to establish care following discharge from community TCU. No other specific recommendations from a psychological perspective prior to discharge.  Time Spent with Patient: 57 minutes  Service Provided: Individual psychotherapy   Provider: Alecia Mina, Ph.D, LP   Provider Pager: [phone]   Provider Phone: [phone]    Alecia Mina, PhD, LP

## 2017-07-10 NOTE — PLAN OF CARE
"Problem: Goal Outcome Summary  Goal: Goal Outcome Summary  PT 7C: Pt ambulated ~275' with IV pole and CGA, occasionally requiring additional UE from arora railing, x3 mild LOB which were self corrected, required 2L O2 to maintain sats>90% (see additional care plan note for specifics). Initiated standing LE exercises to increase strength. Pt reports \"tightness\" in BLE, says legs feel bigger and heavier today, per chart review weight up 8 lbs from yesterday, would benefit from edema consult. Encouraged LE elevation and mobility to decrease edema as well as ambulating 4x/day to increase activity tolerance.     REC:TCU when medically stable for progression of indep with functional mobility as pt is below baseline. Pt lives alone and has 5 JOHNNY home and full flight to basement with laundry.       "

## 2017-07-10 NOTE — PLAN OF CARE
Problem: Goal Outcome Summary  Goal: Goal Outcome Summary  Outcome: Improving  VSS. Pain controlled w/ Oral dilaudid and scheduled tyelonol. Denies nausea. Midline incision dermabonded, CDI. Denies gas or BM. Belching intermittently. Voiding spont in adequate amounts. Left leg dressing CDI. Left CVC infusing w/ MIV. On Regular diet. Up w/ SBA. Resting in between cares

## 2017-07-10 NOTE — PLAN OF CARE
Problem: Goal Outcome Summary  Goal: Goal Outcome Summary  Patient has been assessed for Home Oxygen needs. Oxygen readings:     *Pulse oximetry (SpO2) = 93% on room air at rest while awake.     *SpO2 improved to 97% on 2 liters/minute at rest.     *SpO2 = 87% on room air during activity/with exercise.     *SpO2 improved to 95% on 2 liters/minute during activity/with exercise.

## 2017-07-10 NOTE — PROGRESS NOTES
GYN ONC PROGRESS NOTE  07/11/17    HD#:10 /POD#:4 XL, HELENE, LSO, omentectomy, evacuation of abdominal fluid, PPALND  Disease: Stage 1C borderline ovarian tumor    24 hour events:   - Lymphedema consult, awaiting recs  - PT recommend TCU placement after discharge  - SW, working on TCU placement  - Walking O2sats: 93% at rest on room air, 97% at rest w/ 2L O2, 87% w/ activity on room air, 95% w/ activity w/ 2L O2  - Urinary incontinence: UA reassuring      Subjective: Patient is complaining of more pain this morning, 8/10 . The pain is located in her lower abdomen and radiates to her left hip. She says the pain meds are not helping as much today as they were previously. She is also concerned today about urinary incontinence that she has been experiencing for the past 2 days, especially at night. She usually knows when she is urinating, no dysuria, no hematuria, no urgency or increased frequency. She reports having a UTI with similar symptoms in the past. Eating a regular diet, drinking fluids by mouth. Passing flatus. Two small bowel movements overnight, still feels constipated despite increased bowel regimen. Voiding spontaneously. She is also concerned with the swelling in her legs, self reported 20 pound weight gain. Her skin feels tight in her lower extremities.    Objective:   Vitals:    07/10/17 0722 07/10/17 1204 07/10/17 1345 07/10/17 1508   BP: 114/71 132/77  119/73   BP Location: Right arm Right arm  Right arm   Pulse: 84 98  88   Resp: 16 18  16   Temp: 96.5  F (35.8  C) 98.3  F (36.8  C)  98.1  F (36.7  C)   TempSrc: Oral Oral  Oral   SpO2: 94% 95%  95%   Weight:   102.4 kg (225 lb 12.8 oz)    Height:             Gen- alert, no distress, cooperative  CV- Regular rate and rhythm, no murmur present  Pulm- Normal - Clear to auscultation without rales, rhonchi, or wheezing. and bilateral air exchange present  Abd- soft, appropriately tender, non distended  Incision- dry and intact  Extr- 1+ LE edema, SCD in  place on Rt leg, bite on left leg is covered by a bandage which is dry but has some shadowing    I/Os  (Yesterday // Since Midnight)  PO 990cc // 0cc  IVF 1020cc // 0cc  Urine 1095cc // 625cc    New Labs/Imaging-   Results for orders placed or performed during the hospital encounter of 07/02/17 (from the past 24 hour(s))   Glucose by meter   Result Value Ref Range    Glucose 118 (H) 70 - 99 mg/dL   Glucose by meter   Result Value Ref Range    Glucose 123 (H) 70 - 99 mg/dL   Glucose by meter   Result Value Ref Range    Glucose 103 (H) 70 - 99 mg/dL   Basic metabolic panel   Result Value Ref Range    Sodium 141 133 - 144 mmol/L    Potassium 4.0 3.4 - 5.3 mmol/L    Chloride 106 94 - 109 mmol/L    Carbon Dioxide 32 20 - 32 mmol/L    Anion Gap 3 3 - 14 mmol/L    Glucose 108 (H) 70 - 99 mg/dL    Urea Nitrogen 18 7 - 30 mg/dL    Creatinine 1.17 (H) 0.52 - 1.04 mg/dL    GFR Estimate 47 (L) >60 mL/min/1.7m2    GFR Estimate If Black 56 (L) >60 mL/min/1.7m2    Calcium 8.6 8.5 - 10.1 mg/dL   CBC with platelets   Result Value Ref Range    WBC 8.7 4.0 - 11.0 10e9/L    RBC Count 2.80 (L) 3.8 - 5.2 10e12/L    Hemoglobin 7.6 (L) 11.7 - 15.7 g/dL    Hematocrit 24.8 (L) 35.0 - 47.0 %    MCV 89 78 - 100 fl    MCH 27.1 26.5 - 33.0 pg    MCHC 30.6 (L) 31.5 - 36.5 g/dL    RDW 15.0 10.0 - 15.0 %    Platelet Count 343 150 - 450 10e9/L       Pending cultures (date obtained): None    Assessment: Ms. Corona is a 62 year old female with a borderline ovarian tumor that is POD#4 from a XL, HELENE, LSO, omentectomy, evacuation of abdominal fluid, PPALND, postoperative course complicated by poor pain control, improving.     Active Problem list:    - postoperative state  - borderline ovarian tumor  - DVT  - anemia  - HTN  - Cellulitis  - T2DM      FEN:   - Regular diet      Pain:   - Scheduled tylenol and gabapentin. PRN PO dilaudid.       Heme:   - Recent diagnosis on 6/5 of unprovoked LLE DVT, on Xarelto per hematology.    - Chronic anemia, Hgb 9.6  > 8.6>7.6, hemodilution vs slow intraabdominal bleeding. Will continue to monitor, transfuse if Hgb <7.       CV:   - HTN, HLD. home Lisinopril held as blood pressures have been low-normal.  Continue to monitor.      Pulm:   - No prior hx of lung dz, however pt with SOB and wheezing on exam. CXR WNL on admission. Requiring 1-2L oxygen to maintain gurpreet >90% since surgery. Consider atelectasis vs mild pulmonary edema 2/2 fluid administration vs PE. Encourage IS. No indication for CTA as pt is already on anticoagulation. Consider CXR if increasing O2 requirements or pt develops fever.       GI:   - NI     :  - No issues      ID:   - Cellulitis: Pt has L thigh cellulitis 2/2 dog bite, febrile on admission. s/p IV Vanco/Zosyn and 7 days augmentin. >24hr afebrile. WOC nurse to evaluate.       Endocrine:   - T2DM. Medium SSI. Metformin held.  Blood glucose mildly elevated, continue to monitor closely.      Psych/Neuro:   - anxiety/depression, on home Lexapro      PPX:   - SCD R leg, Xarelto as above.       Disposition: Possible d/c to TCU today or tomorrow.       Penny Quan MD PhD  Ob/Gyn PGY-2  7/11/2017 7:27 AM    Pager [phone]     The patient was seen and examined with the resident, the labs and vital signs were reviewed.The medical care outlined above was provided under my directives and direct supervision.    Laura Pack MD, MPH  Gynecology Oncology

## 2017-07-11 ENCOUNTER — APPOINTMENT (OUTPATIENT)
Dept: OCCUPATIONAL THERAPY | Facility: CLINIC | Age: 62
DRG: 737 | End: 2017-07-11
Attending: NURSE PRACTITIONER
Payer: COMMERCIAL

## 2017-07-11 LAB
ALBUMIN UR-MCNC: NEGATIVE MG/DL
ANION GAP SERPL CALCULATED.3IONS-SCNC: 6 MMOL/L (ref 3–14)
APPEARANCE UR: CLEAR
BACTERIA #/AREA URNS HPF: ABNORMAL /HPF
BILIRUB UR QL STRIP: NEGATIVE
BUN SERPL-MCNC: 14 MG/DL (ref 7–30)
CALCIUM SERPL-MCNC: 8.4 MG/DL (ref 8.5–10.1)
CHLORIDE SERPL-SCNC: 103 MMOL/L (ref 94–109)
CO2 SERPL-SCNC: 31 MMOL/L (ref 20–32)
COLOR UR AUTO: YELLOW
CREAT SERPL-MCNC: 1 MG/DL (ref 0.52–1.04)
ERYTHROCYTE [DISTWIDTH] IN BLOOD BY AUTOMATED COUNT: 14.6 % (ref 10–15)
GFR SERPL CREATININE-BSD FRML MDRD: 56 ML/MIN/1.7M2
GLUCOSE BLDC GLUCOMTR-MCNC: 105 MG/DL (ref 70–99)
GLUCOSE BLDC GLUCOMTR-MCNC: 115 MG/DL (ref 70–99)
GLUCOSE BLDC GLUCOMTR-MCNC: 123 MG/DL (ref 70–99)
GLUCOSE BLDC GLUCOMTR-MCNC: 123 MG/DL (ref 70–99)
GLUCOSE BLDC GLUCOMTR-MCNC: 144 MG/DL (ref 70–99)
GLUCOSE SERPL-MCNC: 128 MG/DL (ref 70–99)
GLUCOSE UR STRIP-MCNC: NEGATIVE MG/DL
HCT VFR BLD AUTO: 24.2 % (ref 35–47)
HGB BLD-MCNC: 7.4 G/DL (ref 11.7–15.7)
HGB UR QL STRIP: NEGATIVE
KETONES UR STRIP-MCNC: NEGATIVE MG/DL
LEUKOCYTE ESTERASE UR QL STRIP: NEGATIVE
MCH RBC QN AUTO: 26.7 PG (ref 26.5–33)
MCHC RBC AUTO-ENTMCNC: 30.6 G/DL (ref 31.5–36.5)
MCV RBC AUTO: 87 FL (ref 78–100)
MUCOUS THREADS #/AREA URNS LPF: PRESENT /LPF
NITRATE UR QL: NEGATIVE
PH UR STRIP: 5 PH (ref 5–7)
PLATELET # BLD AUTO: 370 10E9/L (ref 150–450)
POTASSIUM SERPL-SCNC: 3.7 MMOL/L (ref 3.4–5.3)
RBC # BLD AUTO: 2.77 10E12/L (ref 3.8–5.2)
RBC #/AREA URNS AUTO: 1 /HPF (ref 0–2)
SODIUM SERPL-SCNC: 140 MMOL/L (ref 133–144)
SP GR UR STRIP: 1.01 (ref 1–1.03)
SQUAMOUS #/AREA URNS AUTO: <1 /HPF (ref 0–1)
URN SPEC COLLECT METH UR: ABNORMAL
UROBILINOGEN UR STRIP-MCNC: NORMAL MG/DL (ref 0–2)
WBC # BLD AUTO: 6.8 10E9/L (ref 4–11)
WBC #/AREA URNS AUTO: 1 /HPF (ref 0–2)

## 2017-07-11 PROCEDURE — 25000132 ZZH RX MED GY IP 250 OP 250 PS 637: Performed by: STUDENT IN AN ORGANIZED HEALTH CARE EDUCATION/TRAINING PROGRAM

## 2017-07-11 PROCEDURE — 12000008 ZZH R&B INTERMEDIATE UMMC

## 2017-07-11 PROCEDURE — 99212 OFFICE O/P EST SF 10 MIN: CPT

## 2017-07-11 PROCEDURE — 25000125 ZZHC RX 250: Performed by: STUDENT IN AN ORGANIZED HEALTH CARE EDUCATION/TRAINING PROGRAM

## 2017-07-11 PROCEDURE — 81001 URINALYSIS AUTO W/SCOPE: CPT | Performed by: STUDENT IN AN ORGANIZED HEALTH CARE EDUCATION/TRAINING PROGRAM

## 2017-07-11 PROCEDURE — 99024 POSTOP FOLLOW-UP VISIT: CPT | Mod: GC | Performed by: OBSTETRICS & GYNECOLOGY

## 2017-07-11 PROCEDURE — 97530 THERAPEUTIC ACTIVITIES: CPT | Mod: GO

## 2017-07-11 PROCEDURE — 97140 MANUAL THERAPY 1/> REGIONS: CPT | Mod: GO

## 2017-07-11 PROCEDURE — 36592 COLLECT BLOOD FROM PICC: CPT | Performed by: STUDENT IN AN ORGANIZED HEALTH CARE EDUCATION/TRAINING PROGRAM

## 2017-07-11 PROCEDURE — 25000132 ZZH RX MED GY IP 250 OP 250 PS 637: Performed by: NURSE PRACTITIONER

## 2017-07-11 PROCEDURE — 40000133 ZZH STATISTIC OT WARD VISIT

## 2017-07-11 PROCEDURE — 25000132 ZZH RX MED GY IP 250 OP 250 PS 637: Performed by: HOSPITALIST

## 2017-07-11 PROCEDURE — 97165 OT EVAL LOW COMPLEX 30 MIN: CPT | Mod: GO

## 2017-07-11 PROCEDURE — 25000132 ZZH RX MED GY IP 250 OP 250 PS 637: Performed by: OBSTETRICS & GYNECOLOGY

## 2017-07-11 PROCEDURE — 80048 BASIC METABOLIC PNL TOTAL CA: CPT | Performed by: STUDENT IN AN ORGANIZED HEALTH CARE EDUCATION/TRAINING PROGRAM

## 2017-07-11 PROCEDURE — 25000132 ZZH RX MED GY IP 250 OP 250 PS 637: Performed by: FAMILY MEDICINE

## 2017-07-11 PROCEDURE — 00000146 ZZHCL STATISTIC GLUCOSE BY METER IP

## 2017-07-11 PROCEDURE — 85027 COMPLETE CBC AUTOMATED: CPT | Performed by: STUDENT IN AN ORGANIZED HEALTH CARE EDUCATION/TRAINING PROGRAM

## 2017-07-11 RX ORDER — ONDANSETRON 4 MG/1
4 TABLET, ORALLY DISINTEGRATING ORAL EVERY 6 HOURS PRN
Status: DISCONTINUED | OUTPATIENT
Start: 2017-07-11 | End: 2017-07-12 | Stop reason: HOSPADM

## 2017-07-11 RX ORDER — NALOXONE HYDROCHLORIDE 0.4 MG/ML
.1-.4 INJECTION, SOLUTION INTRAMUSCULAR; INTRAVENOUS; SUBCUTANEOUS
Status: DISCONTINUED | OUTPATIENT
Start: 2017-07-11 | End: 2017-07-12 | Stop reason: HOSPADM

## 2017-07-11 RX ORDER — BISACODYL 10 MG
10 SUPPOSITORY, RECTAL RECTAL DAILY PRN
Status: DISCONTINUED | OUTPATIENT
Start: 2017-07-11 | End: 2017-07-12 | Stop reason: HOSPADM

## 2017-07-11 RX ORDER — AMOXICILLIN 250 MG
2 CAPSULE ORAL 2 TIMES DAILY
Qty: 100 TABLET | DISCHARGE
Start: 2017-07-11 | End: 2017-07-12

## 2017-07-11 RX ORDER — ACETAMINOPHEN 325 MG/1
650 TABLET ORAL EVERY 6 HOURS PRN
Qty: 100 TABLET | DISCHARGE
Start: 2017-07-11 | End: 2017-07-12

## 2017-07-11 RX ORDER — HYDROMORPHONE HYDROCHLORIDE 2 MG/1
4-6 TABLET ORAL
Status: DISCONTINUED | OUTPATIENT
Start: 2017-07-11 | End: 2017-07-12 | Stop reason: HOSPADM

## 2017-07-11 RX ORDER — HYDROMORPHONE HYDROCHLORIDE 4 MG/1
4-6 TABLET ORAL
Qty: 50 TABLET | Refills: 0 | Status: SHIPPED | OUTPATIENT
Start: 2017-07-11 | End: 2017-08-02

## 2017-07-11 RX ORDER — POLYETHYLENE GLYCOL 3350 17 G/17G
17 POWDER, FOR SOLUTION ORAL DAILY
Qty: 7 PACKET | DISCHARGE
Start: 2017-07-11 | End: 2017-07-12

## 2017-07-11 RX ADMIN — HYDROMORPHONE HYDROCHLORIDE 2 MG: 2 TABLET ORAL at 06:13

## 2017-07-11 RX ADMIN — SENNOSIDES AND DOCUSATE SODIUM 2 TABLET: 8.6; 5 TABLET ORAL at 19:36

## 2017-07-11 RX ADMIN — ACETAMINOPHEN 650 MG: 325 TABLET, FILM COATED ORAL at 16:09

## 2017-07-11 RX ADMIN — RIVAROXABAN 20 MG: 20 TABLET, FILM COATED ORAL at 19:27

## 2017-07-11 RX ADMIN — ONDANSETRON 4 MG: 4 TABLET, ORALLY DISINTEGRATING ORAL at 13:17

## 2017-07-11 RX ADMIN — VITAMIN D, TAB 1000IU (100/BT) 2000 UNITS: 25 TAB at 08:42

## 2017-07-11 RX ADMIN — MULTIPLE VITAMINS W/ MINERALS TAB 1 TABLET: TAB at 08:41

## 2017-07-11 RX ADMIN — MAGNESIUM HYDROXIDE 30 ML: 400 SUSPENSION ORAL at 01:14

## 2017-07-11 RX ADMIN — INSULIN ASPART 1 UNITS: 100 INJECTION, SOLUTION INTRAVENOUS; SUBCUTANEOUS at 08:35

## 2017-07-11 RX ADMIN — HYDROMORPHONE HYDROCHLORIDE 2 MG: 2 TABLET ORAL at 22:42

## 2017-07-11 RX ADMIN — ATORVASTATIN CALCIUM 80 MG: 80 TABLET, FILM COATED ORAL at 19:36

## 2017-07-11 RX ADMIN — HYDROMORPHONE HYDROCHLORIDE 2 MG: 2 TABLET ORAL at 16:08

## 2017-07-11 RX ADMIN — HYDROMORPHONE HYDROCHLORIDE 2 MG: 2 TABLET ORAL at 18:54

## 2017-07-11 RX ADMIN — POLYETHYLENE GLYCOL 3350 17 G: 17 POWDER, FOR SOLUTION ORAL at 08:40

## 2017-07-11 RX ADMIN — BISACODYL 10 MG: 10 SUPPOSITORY RECTAL at 01:15

## 2017-07-11 RX ADMIN — FLUTICASONE PROPIONATE 2 SPRAY: 50 SPRAY, METERED NASAL at 08:40

## 2017-07-11 RX ADMIN — ACETAMINOPHEN 650 MG: 325 TABLET, FILM COATED ORAL at 21:50

## 2017-07-11 RX ADMIN — ONDANSETRON 4 MG: 4 TABLET, ORALLY DISINTEGRATING ORAL at 21:50

## 2017-07-11 RX ADMIN — HYDROMORPHONE HYDROCHLORIDE 4 MG: 2 TABLET ORAL at 00:29

## 2017-07-11 RX ADMIN — ACETAMINOPHEN 650 MG: 325 TABLET, FILM COATED ORAL at 06:11

## 2017-07-11 RX ADMIN — SENNOSIDES AND DOCUSATE SODIUM 2 TABLET: 8.6; 5 TABLET ORAL at 08:41

## 2017-07-11 RX ADMIN — HYDROMORPHONE HYDROCHLORIDE 2 MG: 2 TABLET ORAL at 03:41

## 2017-07-11 RX ADMIN — ESCITALOPRAM OXALATE 20 MG: 20 TABLET ORAL at 08:41

## 2017-07-11 RX ADMIN — POLYETHYLENE GLYCOL 3350 17 G: 17 POWDER, FOR SOLUTION ORAL at 19:36

## 2017-07-11 RX ADMIN — HYDROMORPHONE HYDROCHLORIDE 2 MG: 2 TABLET ORAL at 15:41

## 2017-07-11 RX ADMIN — HYDROMORPHONE HYDROCHLORIDE 2 MG: 2 TABLET ORAL at 06:46

## 2017-07-11 RX ADMIN — B-COMPLEX W/ C & FOLIC ACID TAB 1 TABLET: TAB at 08:41

## 2017-07-11 RX ADMIN — ACETAMINOPHEN 650 MG: 325 TABLET, FILM COATED ORAL at 00:29

## 2017-07-11 RX ADMIN — HYDROMORPHONE HYDROCHLORIDE 4 MG: 2 TABLET ORAL at 09:38

## 2017-07-11 RX ADMIN — INSULIN ASPART 1 UNITS: 100 INJECTION, SOLUTION INTRAVENOUS; SUBCUTANEOUS at 13:18

## 2017-07-11 RX ADMIN — HYDROMORPHONE HYDROCHLORIDE 2 MG: 2 TABLET ORAL at 21:50

## 2017-07-11 ASSESSMENT — PAIN DESCRIPTION - DESCRIPTORS
DESCRIPTORS: PRESSURE
DESCRIPTORS: PRESSURE
DESCRIPTORS: ACHING;CONSTANT;DISCOMFORT
DESCRIPTORS: ACHING;SORE
DESCRIPTORS: ACHING;DISCOMFORT
DESCRIPTORS: ACHING;DISCOMFORT
DESCRIPTORS: PRESSURE

## 2017-07-11 NOTE — PROGRESS NOTES
Patient requested to speak with MD regarding constipation.  Patient is constipated and uncomfortable. Ordered milk of magnesia and dulcolax suppository. Patient is hesitant that these will work. Discuss patient if these do not work we can order and enema.     Penny Quan MD PhD  Ob/Gyn PGY-2  7/11/2017 1:15 AM

## 2017-07-11 NOTE — PROVIDER NOTIFICATION
MD Penny Quan paged to notify that Pt having increased discomfort from constipation. MD Crum came and spoke w/ Pt. Milk of magnesia and suppository ordered. Will continue to monitor.

## 2017-07-11 NOTE — PLAN OF CARE
Problem: Goal Outcome Summary  Goal: Goal Outcome Summary  PT/7c: cx - pt declines participating in all activity this afternoon d/t abdominal pain/gas pain despite encouragement from PT. Pt educated on benefits of mobility and walking w/ promoting GI motility and reducing gas pain.

## 2017-07-11 NOTE — PROGRESS NOTES
"SPIRITUAL HEALTH SERVICES  SPIRITUAL ASSESSMENT Progress Note  [facility] ([place]) 7C   ON-CALL VISIT    REFERRAL SOURCE: Page from Nurse     Reviewed PT health history. PT expressed unease with abruptness of transition to TCU. PT expressed frustration with \"having the bladder and bowel control of a [age] year old.\" PT expressed support of local Rabbi, and a Rabbi from her place of employment [employer] [facility] [employer]. PT compared her struggle with depression to the song \"I fought the law and the law won\", saying \"I'm not sure I won, but I made it to the other side. PT expressed desire for pain to be gone \"I asked the nurse maggie if I could just take a pill that would knock me out for a couple days and I could just wake up better.\" PT expressed her ecumenical and interfaith background. Shared a prayer for barrington and healing with the PT.    PLAN: Will inform unit  of visit.    Fredi Steward   Intern  Pager [phone]    "

## 2017-07-11 NOTE — PLAN OF CARE
Problem: Goal Outcome Summary  Goal: Goal Outcome Summary  Outcome: No Change  VSS. Pain controlled with PO Dilaudid. Was planning to D/C today but pt became nauseous and had 300cc emesis. Zofran administered with relief. Passing gas. Regular diet. Midline CDI. BG covered per sliding scale. Pt did not take noon Tylenol d/t being nauseous. Left leg dressing changed by [facility] nurse.  saw pt this AM. Plan to stay the night and discharge tomorrow to TCU. Continue to monitor.

## 2017-07-11 NOTE — PLAN OF CARE
Problem: Goal Outcome Summary  Goal: Goal Outcome Summary  Edema 7C: GCB initiated to B LE to promote fluid mobilization, skin integrity, and comfort/ease with ADLs. OK for pt to wear compression bandages until therapy returns. However, please remove any compression if it causes numbness, tingling, pain, becomes soiled, or if pt becomes unable to verbalize pain. Will follow up with patient.

## 2017-07-11 NOTE — PLAN OF CARE
Problem: Goal Outcome Summary  Goal: Goal Outcome Summary  Outcome: Improving  VSS. Pt up with SBA. Ambulated in halls. Tolerating regular diet. No insulin given per sliding scale. Pt voids spont with adequate UOP. Left leg wound dressing changed per WOC orders. Pain managed with PO dilaudid see eMar. CVC saline locked. D/C to TCU tomorrow. Cont. POC.

## 2017-07-11 NOTE — PROGRESS NOTES
Patient is having urinary incontinence and reports this happens when she gets a UTI. Will check a UA.    Penny Quan MD PhD  Ob/Gyn PGY-2  7/11/2017 3:59 AM

## 2017-07-11 NOTE — PLAN OF CARE
Problem: Goal Outcome Summary  Goal: Goal Outcome Summary  Outcome: Improving  VSS. Pain increased beginning of shift (see note) improved w/ milk of magnesia and suppository in addition to PO dilaudid and scheduled tyelonol. Denies nausea. Midline incision CDI. Passing gas, small hard stool passed x2. Voiding in spont amounts, having urinary incontinence due to urgency. UA to be collected. Up w/ SBA. L CVC saline locked. Left leg dressing CDI. On Regular diet. Continue w/ POC.

## 2017-07-11 NOTE — PROGRESS NOTES
Social Work Services Progress Note    Hospital Day: 10  Date of Initial Social Work Evaluation:  07/05/17  Collaborated with:  Pt, [facility] (Iris: [phone]; f: [phone]), pt's brother (Phil: [phone]), [facility]    Data:  Pt will d/c tomorrow.     Intervention:      ZACARIAS met with pt and Kimberley from [facility] in pt's room. Kimberley explained TCU services to pt and stated that they will have a private room for pt. Pt asked to see medical team, as she has concerns about d/c'ing today.  Kimberley stated that they will have a bed for pt tomorrow, as well. Pt asked SW to update her brother.    ZACARIAS spoke with [facility] intern who stated that pt will now d/c tomorrow.    ZACARIAS spoke with Iris with [facility] who stated that they can take pt tomorrow at 1:00. Iris said that they use [facility] for O2.     ZACARIAS updated pt's brother re: d/c plan. He stated that he may be in town tomorrow.     ZACARIAS spoke with pt who stated that her brother can transport tomorrow at 2:00.     ZACARIAS set up O2 travel tank with [facility]  ([phone]) who stated that they will need the Facesheet and orders faxed when available. Provided with [facility]'s ([place]) contact.     ZACARIAS left  with pt's brother re: d/c time, transport and O2 transport tank. Provided with [facility]'s [place]) contact.     ZACARIAS text paged [facility] team re: d/c plan and request for orders in the morning to fax to [facility]      Assessment:  Pt will d/c tomorrow at 1:30 with 2L of O2 (through [facility]) to [facility]. Pt's brother, Phil, will transport. Pt in agreement w/ d/c plan.    Plan:    Anticipated Disposition:  Facility:  [facility]    Barriers to d/c plan:  Medical stability    Follow Up:  ZACARIAS will continue to follow to assist with d/c plan.    MARY Marie, ENID  7C Surgical Oncology Unit  Phone: [phone]  Pager: [phone]

## 2017-07-11 NOTE — PROGRESS NOTES
[facility] Nurse Inpatient Wound Assessment     Followup Assessment of wound(s) on pt's:   Left lateral leg    Data:     Patient History:      per MD notes): 62 year old female with history of DM-2, anxiety, HLD, chronic rhinitis, right oophorectomy (), DVT (diagnosed 17) on Xarelto who presented to the ED for evaluation of pelvic pain, per CT pelvic mass. Left upper lateral leg dog bite about 2 weeks ago was opened and drained in ED.     Moisture Management:  dressings    Current Diet / Nutrition:         Active Diet Order      Regular Diet Adult      Advance Diet as Tolerated           Quinton Assessment and sub scores:   Quinton Score  Av  Min: 19  Max: 19   Recent Labs   Lab Test  17   0426   17   0823   17   2255   17   1125   ALBUMIN   --    --    --    --   3.2*   < >   --    HGB  7.4*   < >   --    < >  8.6*   < >   --    INR   --    --    --    --   1.17*   < >   --    WBC  6.8   < >   --    < >  9.3   < >   --    A1C   --    --    --    --    --    --   6.0   CRP   --    --   49.0*   < >   --    --    --     < > = values in this interval not displayed.     Wound Assessment (location #1):   Left Upper Lateral Leg  Wound History:  Dog bite, with one deep tissue puncture.  With 2 superficial abrasion on either side of primary wound    Wound Base: not visible, 1cm incision drain incision, surrounding bruising has faded. Induration persists with normal skin temperature, suspect residual hematoma.  No active bleeding with wound cares. Supeficial scabs on either side of I&D incision    Tunnelin-3  cm    Palpation of the wound bed:  Firm induration more superior and lateral than medial    Slough appearance:  none    Periwound Skin: intact,  Bruising now faded    Drainage: scant serous    Odor: none    Pain:  absent          Intervention:     Patient's chart evaluated.      Wound(s) was assessed    Wound Care: was done:  Changed dressing, visual  "inspection    Orders  Reviewed    Supplies  Ordered: 1/4 Iodoform packing    Discussed plan of care with Patient          Assessment:        Left leg wound 2/2 dog bite 2 week ago, persistent induration, suspected residual hematoma         Plan:     Nursing to notify the Provider(s) and re-consult the [facility] Nurse if wound(s) deteriorate(s).    Plan of care for wound located on Left Lateral Thigh: Change dressing daily, clean with Microklenz, pack wound with 1/4\" Iodoform gauze, cover with Mepilex dressing    WO Nurse will return: 1 week     Face to face time: 20 minutes     "

## 2017-07-11 NOTE — PROGRESS NOTES
"   07/11/17 1115   Rehab Discipline   Discipline OT   Type of Visit   Type of visit Initial Edema Evaluation   General Information   Start of care 07/11/17   Referring physician Concepcion Kohler, MARLON CNP   Orders Evaluate and treat as indicated   Order date 07/10/17   Medical diagnosis ovarian tumor   Onset of illness / date of surgery 07/02/17   Edema onset (since May in L LE, ~1 week in R LE)   Affected body parts LLE;RLE;Trunk   Edema etiology comments surgery/omentectomy, pelvic mass, hypoalbuminemia, decreased muscle pump, fluid overload   Pertinent history of current problem (PT: include personal factors and/or comorbidities that impact the POC; OT: include additional occupational profile info) Ms. Corona is a 62 year old female with a borderline ovarian tumor that is POD#4 from a XL, HELENE, LSO, omentectomy, evacuation of abdominal fluid, PPALND, postoperative course complicated by poor pain control, improving.    Surgical / medical history reviewed Yes   Prior level of functional mobility I without AD   Prior treatment (none)   Patient role / employment history Employed   Living environment House / [facility]   Living environment comments Pt fatigued and with significant abdominal pain. Limited PLOF/background information obtained per pt request to limit talking. Per PT eval, \"Pt lives alone in single story duplex with basement (laundry located in basement, full flight down with handrail), [address]  home. Pt lives with two \"big\" dogs and a cat. Pt notes that home is messy and that dogs remain home alone for long hours dt her work schedule so she is required to  \"messes\". Pt reporting minimal assist available with brothers living in [place] and friends assisting their families. \"   Subjective Report   Patient report of symptoms Legs feel \"tight\" and \"heavy,\" making moving and dressing difficult   Precautions   Precautions (abdominal precautions)   Precautions comments may have had cellulitis in L LE, DVT (+) in " May   Patient / Family Goals   Patient / family goals statement To lose weight in her legs   Pain   Patient currently in pain Yes, see vital signs flowsheet   Pain comments nauseous and with abdominal pain   Cognitive Status   Orientation Orientation to person, place and time   Level of consciousness Lethargic / Somnolent   Edema Exam / Assessment   Skin condition Dryness;Pitting   Skin condition comments L LE > R LE, pt reports she was diagnosed with blood clot in May and edema has existed ever since. Pt with taut 1+-2+ pitting in B LEs to the thigh, making ambulation and movement difficult. Abdomen distended. Pt with bruising/varicosities present on bilateral legs. Skin shiny, taut, with skin creasing noted at MTPs. Pt with area of discoloration on distal L LE. Mepilex dressing applied to lateral L LE, R LE edematous ~1 week since surgery per pt. Dryness noted.    Pitting 1+;2+   Capillary refill Symmetrical   Dorsal pedal pulse Symmetrical   Girth Measurements   Girth Measurements Refer to separate girth measurement flowsheet   Range of Motion   ROM comments unable to assess, pt declined   Strength   Strength comments unable to assess, pt declined   Activities of Daily Living   Activities of Daily Living Pt lives alone, works FT assisting people find jobs. Pt was I with ADLs, driving, cooking, cleaning, laundry, medication management, and finances PTA. Pt takes care of 2 dogs and 1 cat.   Sensory   Sensory perception comments denied n/t   Planned Edema Interventions   Planned edema interventions Gradient compression bandaging;Fit for compression garment;Exercises;Precautions to prevent infection / exacerbation;Education;ADL training;Skin care / precautions;Home management program development   Clinical Impression   Criteria for skilled therapeutic intervention met Yes   Therapy diagnosis Decreased I/ease with ADLs   Influenced by the following impairments / conditions Edema;Wounds / Ulcers  (mepilex dressing over  dog bite on L LE)   Assessment of Occupational Performance 3-5 Performance Deficits   Identified Performance Deficits home management, functional mobility/transfers, showering, dressing   Clinical Decision Making (Complexity) Low complexity   Treatment frequency 5 times / week  (BID OT/edema)   Treatment duration 1 week   Patient / family and/or staff in agreement with plan of care Yes   Risks and benefits of therapy have been explained Yes   Clinical impression comments Pt to benefit from skilled OT to address B LE edema, promote skin integrity, comfort, and ease/I with ADLs and IADLs for return to OF. See daily flowsheet for details regarding today's treatment.    Goals   Edema Eval Goals (IP) See plan of care for patient goals   Total Evaluation Time   Total evaluation time 8

## 2017-07-11 NOTE — PROGRESS NOTES
"While rounding on Patient, Patient found to be upset/teary and verbalizing frustration and \"wanting to increase psych meds to help her sleep a long time.\" Pt confirmed she has no intent to harm herself. This nurse provided emotional support. Will pass on to Day Nurse and continue to assess.   "

## 2017-07-12 ENCOUNTER — APPOINTMENT (OUTPATIENT)
Dept: OCCUPATIONAL THERAPY | Facility: CLINIC | Age: 62
DRG: 737 | End: 2017-07-12
Payer: COMMERCIAL

## 2017-07-12 ENCOUNTER — AMBULATORY - HEALTHEAST (OUTPATIENT)
Dept: ADMINISTRATIVE | Facility: CLINIC | Age: 62
End: 2017-07-12

## 2017-07-12 ENCOUNTER — APPOINTMENT (OUTPATIENT)
Dept: PHYSICAL THERAPY | Facility: CLINIC | Age: 62
DRG: 737 | End: 2017-07-12
Payer: COMMERCIAL

## 2017-07-12 ENCOUNTER — CARE COORDINATION (OUTPATIENT)
Dept: CARE COORDINATION | Facility: CLINIC | Age: 62
End: 2017-07-12

## 2017-07-12 VITALS
HEART RATE: 96 BPM | RESPIRATION RATE: 17 BRPM | DIASTOLIC BLOOD PRESSURE: 68 MMHG | BODY MASS INDEX: 38.19 KG/M2 | HEIGHT: 64 IN | OXYGEN SATURATION: 93 % | SYSTOLIC BLOOD PRESSURE: 116 MMHG | WEIGHT: 223.7 LBS | TEMPERATURE: 98.1 F

## 2017-07-12 LAB
GLUCOSE BLDC GLUCOMTR-MCNC: 120 MG/DL (ref 70–99)
GLUCOSE BLDC GLUCOMTR-MCNC: 121 MG/DL (ref 70–99)
GLUCOSE BLDC GLUCOMTR-MCNC: 136 MG/DL (ref 70–99)
GLUCOSE BLDC GLUCOMTR-MCNC: 142 MG/DL (ref 70–99)
GLUCOSE BLDC GLUCOMTR-MCNC: 93 MG/DL (ref 70–99)

## 2017-07-12 PROCEDURE — 97530 THERAPEUTIC ACTIVITIES: CPT | Mod: GP

## 2017-07-12 PROCEDURE — 25000132 ZZH RX MED GY IP 250 OP 250 PS 637: Performed by: NURSE PRACTITIONER

## 2017-07-12 PROCEDURE — 97140 MANUAL THERAPY 1/> REGIONS: CPT | Mod: GO | Performed by: OCCUPATIONAL THERAPIST

## 2017-07-12 PROCEDURE — 40000133 ZZH STATISTIC OT WARD VISIT: Performed by: OCCUPATIONAL THERAPIST

## 2017-07-12 PROCEDURE — 25000132 ZZH RX MED GY IP 250 OP 250 PS 637: Performed by: FAMILY MEDICINE

## 2017-07-12 PROCEDURE — 97110 THERAPEUTIC EXERCISES: CPT | Mod: GP

## 2017-07-12 PROCEDURE — 00000146 ZZHCL STATISTIC GLUCOSE BY METER IP

## 2017-07-12 PROCEDURE — 97116 GAIT TRAINING THERAPY: CPT | Mod: GP

## 2017-07-12 PROCEDURE — 40000193 ZZH STATISTIC PT WARD VISIT

## 2017-07-12 PROCEDURE — 25000132 ZZH RX MED GY IP 250 OP 250 PS 637: Performed by: STUDENT IN AN ORGANIZED HEALTH CARE EDUCATION/TRAINING PROGRAM

## 2017-07-12 PROCEDURE — 25000132 ZZH RX MED GY IP 250 OP 250 PS 637: Performed by: HOSPITALIST

## 2017-07-12 RX ORDER — ACETAMINOPHEN 325 MG/1
650 TABLET ORAL EVERY 6 HOURS PRN
Qty: 100 TABLET | Refills: 0 | Status: SHIPPED | OUTPATIENT
Start: 2017-07-12 | End: 2017-11-01

## 2017-07-12 RX ORDER — AMOXICILLIN 250 MG
2 CAPSULE ORAL 2 TIMES DAILY
Qty: 100 TABLET | Refills: 0 | Status: SHIPPED | OUTPATIENT
Start: 2017-07-12 | End: 2017-11-01

## 2017-07-12 RX ORDER — POLYETHYLENE GLYCOL 3350 17 G/17G
17 POWDER, FOR SOLUTION ORAL DAILY
Qty: 7 PACKET | Refills: 0 | Status: SHIPPED | OUTPATIENT
Start: 2017-07-12 | End: 2017-11-01

## 2017-07-12 RX ADMIN — HYDROMORPHONE HYDROCHLORIDE 4 MG: 2 TABLET ORAL at 09:15

## 2017-07-12 RX ADMIN — HYDROMORPHONE HYDROCHLORIDE 4 MG: 2 TABLET ORAL at 04:41

## 2017-07-12 RX ADMIN — ACETAMINOPHEN 650 MG: 325 TABLET, FILM COATED ORAL at 09:15

## 2017-07-12 RX ADMIN — VITAMIN D, TAB 1000IU (100/BT) 2000 UNITS: 25 TAB at 10:26

## 2017-07-12 RX ADMIN — SIMETHICONE CHEW TAB 80 MG 80 MG: 80 TABLET ORAL at 01:34

## 2017-07-12 RX ADMIN — B-COMPLEX W/ C & FOLIC ACID TAB 1 TABLET: TAB at 10:25

## 2017-07-12 RX ADMIN — ESCITALOPRAM OXALATE 20 MG: 20 TABLET ORAL at 10:22

## 2017-07-12 RX ADMIN — HYDROMORPHONE HYDROCHLORIDE 4 MG: 2 TABLET ORAL at 12:37

## 2017-07-12 RX ADMIN — ACETAMINOPHEN 650 MG: 325 TABLET, FILM COATED ORAL at 04:42

## 2017-07-12 RX ADMIN — MULTIPLE VITAMINS W/ MINERALS TAB 1 TABLET: TAB at 10:25

## 2017-07-12 RX ADMIN — FLUTICASONE PROPIONATE 2 SPRAY: 50 SPRAY, METERED NASAL at 09:10

## 2017-07-12 RX ADMIN — SENNOSIDES AND DOCUSATE SODIUM 1 TABLET: 8.6; 5 TABLET ORAL at 10:23

## 2017-07-12 RX ADMIN — HYDROMORPHONE HYDROCHLORIDE 4 MG: 2 TABLET ORAL at 01:30

## 2017-07-12 ASSESSMENT — PAIN DESCRIPTION - DESCRIPTORS
DESCRIPTORS: ACHING;CRAMPING
DESCRIPTORS: CRAMPING;DISCOMFORT

## 2017-07-12 NOTE — PROGRESS NOTES
GYN ONC PROGRESS NOTE  07/12/17    HD#:11 /POD#:5 XL, HELENE, LSO, omentectomy, evacuation of abdominal fluid, PPALND  Disease: Stage 1C borderline ovarian tumor    24 hour events:   - WOC recs: continue daily dressing changes, clean with Microklenz  - Lymphedema therapist: placed compression wraps for comfort, compression stockings indicated after swelling decreases  - SW: discharge to [facility]  - PT: patient declined  - Bowel movement x3    Subjective: Patient is feeling better today.  Pain is controlled with PO dilaudid, reporting some breakthrough pain at the inferior aspect of her incision.  Eating a regular diet, but backing off a little due to emesis yesterday. Drinking fluids.  Passing Flatus, 3 BMs yesterday.  Voiding spontaneously.     Objective:   Vitals:    07/11/17 1014 07/11/17 1419 07/11/17 1514 07/11/17 2312   BP:  119/70 134/79 115/67   BP Location:  Right arm Right arm Right arm   Pulse: 96      Resp:  18 18 18   Temp:  98.7  F (37.1  C) 98.6  F (37  C) 99.9  F (37.7  C)   TempSrc:  Oral Oral Oral   SpO2: 97% 92% 94% 96%   Weight:       Height:             Gen- alert, no distress, cooperative  CV- Regular rate and rhythm, 3/6 systolic murmur at R/LUSB  Pulm- Normal - Clear to auscultation without rales, rhonchi, or wheezing. and bilateral air exchange present  Abd- soft, appropriately tender, non distended  Incision- dry and intact  Extr- 1+ LE edema, bilateral compression wrappings, bite on left leg is covered by a bandage which is dry     I/Os  (Yesterday // Since Midnight)  PO 880cc // 120cc  IVF 0cc // 0cc  Urine 1650cc // 250cc    New Labs/Imaging-   Results for orders placed or performed during the hospital encounter of 07/02/17 (from the past 24 hour(s))   Glucose by meter   Result Value Ref Range    Glucose 123 (H) 70 - 99 mg/dL   CBC with platelets   Result Value Ref Range    WBC 6.8 4.0 - 11.0 10e9/L    RBC Count 2.77 (L) 3.8 - 5.2 10e12/L    Hemoglobin 7.4 (L) 11.7 - 15.7 g/dL     Hematocrit 24.2 (L) 35.0 - 47.0 %    MCV 87 78 - 100 fl    MCH 26.7 26.5 - 33.0 pg    MCHC 30.6 (L) 31.5 - 36.5 g/dL    RDW 14.6 10.0 - 15.0 %    Platelet Count 370 150 - 450 10e9/L   Basic metabolic panel   Result Value Ref Range    Sodium 140 133 - 144 mmol/L    Potassium 3.7 3.4 - 5.3 mmol/L    Chloride 103 94 - 109 mmol/L    Carbon Dioxide 31 20 - 32 mmol/L    Anion Gap 6 3 - 14 mmol/L    Glucose 128 (H) 70 - 99 mg/dL    Urea Nitrogen 14 7 - 30 mg/dL    Creatinine 1.00 0.52 - 1.04 mg/dL    GFR Estimate 56 (L) >60 mL/min/1.7m2    GFR Estimate If Black 68 >60 mL/min/1.7m2    Calcium 8.4 (L) 8.5 - 10.1 mg/dL   UA with Microscopic reflex to Culture   Result Value Ref Range    Color Urine Yellow     Appearance Urine Clear     Glucose Urine Negative NEG mg/dL    Bilirubin Urine Negative NEG    Ketones Urine Negative NEG mg/dL    Specific Gravity Urine 1.008 1.003 - 1.035    Blood Urine Negative NEG    pH Urine 5.0 5.0 - 7.0 pH    Protein Albumin Urine Negative NEG mg/dL    Urobilinogen mg/dL Normal 0.0 - 2.0 mg/dL    Nitrite Urine Negative NEG    Leukocyte Esterase Urine Negative NEG    Source Midstream Urine     WBC Urine 1 0 - 2 /HPF    RBC Urine 1 0 - 2 /HPF    Bacteria Urine Few (A) NEG /HPF    Squamous Epithelial /HPF Urine <1 0 - 1 /HPF    Mucous Urine Present (A) NEG /LPF   Glucose by meter   Result Value Ref Range    Glucose 144 (H) 70 - 99 mg/dL   Glucose by meter   Result Value Ref Range    Glucose 142 (H) 70 - 99 mg/dL   Glucose by meter   Result Value Ref Range    Glucose 120 (H) 70 - 99 mg/dL   Glucose by meter   Result Value Ref Range    Glucose 121 (H) 70 - 99 mg/dL       Pending cultures (date obtained): None     Assessment: Ms. Corona is a 62 year old female with a borderline ovarian tumor that is POD#5 from a XL, HELENE, LSO, omentectomy, evacuation of abdominal fluid, PPALND, postoperative course complicated by poor pain control, improving.      Active Problem list:    - postoperative state  -  borderline ovarian tumor  - DVT  - anemia  - HTN  - Cellulitis  - T2DM      FEN:   - Regular diet      Pain:   - Scheduled tylenol and gabapentin. PRN PO dilaudid.       Heme:   - Recent diagnosis on 6/5 of unprovoked LLE DVT, on Xarelto per hematology.    - Chronic anemia, Hgb 9.6 > 8.6>7.6, hemodilution vs slow intraabdominal bleeding. Will continue to monitor, transfuse if Hgb <7.       CV:   - HTN, HLD. home Lisinopril held as blood pressures have been low-normal.  Continue to monitor. Lower extremity edema of unknown etiology, most likely due to post surgical fluid accumulation. Lymphedema wraps placed.       Pulm:   - No prior hx of lung dz, however pt with SOB and wheezing on exam. CXR WNL on admission. Requiring 1-2L oxygen to maintain gurpreet >90% since surgery. Consider atelectasis vs mild pulmonary edema 2/2 fluid administration vs PE. Encourage IS. No indication for CTA as pt is already on anticoagulation. Consider CXR if increasing O2 requirements or pt develops fever. Plan to d/c with O2 to TCU      GI:   - NI      :  - No issues      ID:   - Cellulitis: Pt has L thigh cellulitis 2/2 dog bite, febrile on admission. s/p IV Vanco/Zosyn and 7 days augmentin. >24hr afebrile. WO nurse recommends daily dressing changes and cleaning with microklenz.       Endocrine:   - T2DM. Medium SSI. Metformin held.  Blood glucose mildly elevated, continue to monitor closely. Plan to restart metformin on d/c.      Psych/Neuro:   - anxiety/depression, on home Lexapro      PPX:   - SCD R leg, Xarelto as above.       Disposition: Possible d/c to TCU today.             The patient was seen and examined with the resident, the labs and vital signs were reviewed.The medical care outlined above was provided under my directives and direct supervision.    Laura Pack MD, MPH  Gynecology Oncology

## 2017-07-12 NOTE — PLAN OF CARE
Problem: Goal Outcome Summary  Goal: Goal Outcome Summary  PT-7C: Good tolerance for therapy this day. Demonstrating gait instability when amb without AD - amb ~ 300' with HHA from PT.  Educated on use of cane vs FWW to improve independence, stability and safety with amb - pt willing to trail at TCU facility.  Review of seated LE exercises.       Recommend discharge to TCU.

## 2017-07-12 NOTE — PLAN OF CARE
Problem: Goal Outcome Summary  Goal: Goal Outcome Summary  Edema 7C: Wraps removed and skin cares performed. Skin remains intact with generalized 1+/2+ pitting throughout B LEs. Reapplication of GCB from MTPs to knee creases for further management of LE edema and increased ease with functional mobility. Remove wraps if causing pain/numbness or become soiled.

## 2017-07-12 NOTE — PLAN OF CARE
Problem: Individualization  Goal: Patient Preferences  Ms Corona has left 7C with family to go to TCU. I called  an informed Iris of this. She told me to call  to talk with a nurse which I did and then was transferred to an answering machine of the 2nd floor nursing staff, I left a message for them to call me on 7C if they needed further information than what was fax'd by our .

## 2017-07-12 NOTE — PLAN OF CARE
Patient has been assessed for Home Oxygen needs. Oxygen readings:    *Pulse oximetry (SpO2)85 % on room air at rest while awake.    *SpO2 improved to 91 % on 2 liters/minute at rest.    *SpO2 = 80% on room air during activity/with exercise.    *SpO2 improved to 94% on 2 liters/minute during activity/with exercise.

## 2017-07-12 NOTE — PLAN OF CARE
Problem: Goal Outcome Summary  Goal: Goal Outcome Summary  Physical Therapy Discharge Summary     Reason for therapy discharge:    Discharged to transitional care facility.     Progress towards therapy goal(s). See goals on Care Plan in [facility] electronic health record for goal details.  Goals partially met.  Barriers to achieving goals:   discharge from facility.     Therapy recommendation(s):    Continued therapy is recommended.  Rationale/Recommendations:  Progressing functional strenght and balance to improve indendence with functional mobility, trial of AD to improve gait training.

## 2017-07-12 NOTE — PROGRESS NOTES
Social Work Services Discharge Note      Patient Name:  Hafsa Corona     Anticipated Discharge Date:  7/12/2017    Discharge Disposition:   TCU:  [facility]   [address] [place] [address]  Phone: [phone]  Fax: [phone]  Admissions: [phone]     Pre-Admission Screening (PAS) online form has been completed.  The Level of Care (LOC) is:  Determined  Confirmation Code is:  [id number]  Patient/caregiver informed of referral to [facility] Line for Pre-Admission Screening for skilled nursing facility (SNF) placement and to expect a phone call post discharge from SNF.     Additional Services/Equipment Arranged:  Pt's family/friend will provide transport around 1pm today.  Travel oxygen to be delivered via [employer] to hospital prior to transport.      Patient / Family response to discharge plan:  In agreement with DC today to TCU     Persons notified of above discharge plan:  Gyn/Onc team, 7C RN staff, [facility] ([facility]) and pt     Staff Discharge Instructions:  SW faxed discharge orders and signed hard scripts for any controlled substances.  Please print a packet (including scripts) and send with patient.     CTS Handoff completed:  Will complete upon DC Medicare Notice of Rights provided to the patient/family:      ENID Callaway, MSW  7D  (Hem/Onc) -covering 7C   Pager: [phone]  7/12/2017

## 2017-07-12 NOTE — PROGRESS NOTES
"[facility]  \"Hello, my name is Rosa Gary , and I am calling from the [facility].  I want to check in and see how you are doing, after leaving the hospital.  You may also receive a call from your Care Coordinator (care team), but I want to make sure you don t have any urgent needs.  I have a couple questions to review with you:     Post-Discharge Outreach                                                    Hafsa Corona is a 62 year old female     Follow-up Appointments           Follow Up and recommended labs and tests         Follow up 7/31/17 Dr Shaffer - call prior to then for questions or concerns [phone].     Left thigh wound care instructions: Change dressing daily, clean with Microklenz, pack wound with 1/4\" Iodoform gauze, cover with Mepilex dressing     Left leg DVT: Continue taking Xarelto as prescribed by your hematologist. Follow up with your hematologist as previously scheduled for further management of your DVT.                 Care Team:    Patient Care Team       Relationship Specialty Notifications Start End    Morelia Ayon NP PCP - General   [birth year]05     Phone: [phone] Fax: [phone]         [facility] [address] [facility] [address]            Transition of Care Review                                                      Did you have a surgery or procedure during your hospital visit? Yes   If yes, do you have any of the following:     Signs of infection:  NO    Pain:  Yes     Pain Scale (0-10) 3/10     Location: Surg site    Wound/incision concerns? No    Do you have all of your medications/refills?  Yes    Are you having any side effects or questions about your medication(s)? No    Do you have any new or worsening symptoms?  No    Do you have any future appointments scheduled?   Yes             Plan                                                      Thanks for your time.  Your Care Coordinator may follow-up " within the next couple days.  In the meantime if you have questions, concerns or problems call your care team.        Rosa Gary

## 2017-07-12 NOTE — PLAN OF CARE
Problem: Individualization  Goal: Patient Preferences  Pt is aware she will be going to the TCU today ~ 1pm. Her friend will drive her. Presently her brother is at bedside assisting her to pack her belongings. 7C NUL obtained Ms Corona's envelopes from security and returned them to her. Await O2 delivery. CVC IV removed by MD team and I changed the drsg on her L leg. She seems in good spirits and is getting herself dressed and organized for departure from .

## 2017-07-12 NOTE — PLAN OF CARE
Problem: Goal Outcome Summary  Goal: Goal Outcome Summary  Outcome: Improving  VSS. Pain managed with PO Dilaudid. One episode of nausea that was relieved with PO zofran. Passing gas and BS audible and active. Regular diet. Midline CDI. BG stable and did not require any sliding scale coverage,. Left leg dressing c/d/i. Ambulated in the hallway with stand by assist. Friend visited for several hours and patient in good spirits.  saw pt this AM. Plan to discharge tomorrow to TCU. Continue to monitor.

## 2017-07-12 NOTE — PLAN OF CARE
Problem: Goal Outcome Summary  Goal: Goal Outcome Summary  OT 7C: cancel, pt preparing to d/c to TCU this PM.

## 2017-07-12 NOTE — PLAN OF CARE
Problem: Individualization  Goal: Patient Preferences  When I gave her pain meds ~ 12:40 I noted blood on her gown at IV removal site-- I held pressure and MD who had removed the CVC line arrived to hold pressure then re- apply a drsg. Ms Corona is now packing her belonging again. O2 tank arrived. Pt declines to wear O2 as she leaves 7C with her family.

## 2017-07-12 NOTE — PLAN OF CARE
Problem: Goal Outcome Summary  Goal: Goal Outcome Summary  Outcome: Therapy, progress toward functional goals as expected  Pain managed with oral dilaudid, tylenol. Simethicone for cramping. States she is not passing gas tonight and had a BM yesterday. Abdomen soft, + bowel sounds. Denies nausea, has had clear liquids overnight. Up to bathroom with assist of 1, voiding adequate amts, some dribbling but no incontinence. Lymphedema wraps in place- bilat LEs. Wound on L leg- dressing c/d/i. Pt calm/pleasant/cooperative, talked about discharging this morning to another facility and seems ok with that.

## 2017-07-13 ENCOUNTER — OFFICE VISIT - HEALTHEAST (OUTPATIENT)
Dept: GERIATRICS | Facility: CLINIC | Age: 62
End: 2017-07-13

## 2017-07-13 DIAGNOSIS — L02.416 ABSCESS OF LEFT LOWER EXTREMITY: ICD-10-CM

## 2017-07-13 DIAGNOSIS — I82.409 DVT (DEEP VENOUS THROMBOSIS) (H): ICD-10-CM

## 2017-07-13 DIAGNOSIS — F98.8 ADD (ATTENTION DEFICIT DISORDER) WITHOUT HYPERACTIVITY: ICD-10-CM

## 2017-07-13 DIAGNOSIS — F40.01 PANIC DISORDER WITH AGORAPHOBIA: ICD-10-CM

## 2017-07-13 DIAGNOSIS — E11.9 DIABETES MELLITUS, TYPE 2 (H): ICD-10-CM

## 2017-07-13 DIAGNOSIS — M54.50 LUMBAGO: ICD-10-CM

## 2017-07-13 DIAGNOSIS — F41.9 ANXIETY: ICD-10-CM

## 2017-07-13 DIAGNOSIS — R19.00 PELVIC MASS: ICD-10-CM

## 2017-07-13 DIAGNOSIS — F32.A DEPRESSIVE DISORDER: ICD-10-CM

## 2017-07-13 DIAGNOSIS — D64.9 NORMOCYTIC ANEMIA: ICD-10-CM

## 2017-07-13 LAB — COPATH REPORT: NORMAL

## 2017-07-13 NOTE — PLAN OF CARE
Problem: Goal Outcome Summary  Goal: Goal Outcome Summary  Occupational Therapy Discharge Summary     Reason for therapy discharge:    Discharged to transitional care facility.     Progress towards therapy goal(s). See goals on Care Plan in [facility] electronic health record for goal details.  Goals partially met.  Barriers to achieving goals:   discharge from facility.     Therapy recommendation(s):    Continued therapy is recommended.  Rationale/Recommendations:  Pt would benefit from further therapy to increase safety and independence with ADLs/IADLs and functional mobility. .

## 2017-07-14 ENCOUNTER — OFFICE VISIT - HEALTHEAST (OUTPATIENT)
Dept: GERIATRICS | Facility: CLINIC | Age: 62
End: 2017-07-14

## 2017-07-14 DIAGNOSIS — E11.9 DIABETES MELLITUS, TYPE 2 (H): ICD-10-CM

## 2017-07-14 DIAGNOSIS — G89.18 POST-OP PAIN: ICD-10-CM

## 2017-07-14 DIAGNOSIS — L02.416 ABSCESS OF LEFT LOWER EXTREMITY: ICD-10-CM

## 2017-07-14 DIAGNOSIS — R19.00 PELVIC MASS: ICD-10-CM

## 2017-07-14 DIAGNOSIS — F41.9 ANXIETY: ICD-10-CM

## 2017-07-14 DIAGNOSIS — F32.A DEPRESSIVE DISORDER: ICD-10-CM

## 2017-07-14 DIAGNOSIS — I82.409 DVT (DEEP VENOUS THROMBOSIS) (H): ICD-10-CM

## 2017-07-14 RX ORDER — LISDEXAMFETAMINE DIMESYLATE 50 MG/1
50 CAPSULE ORAL DAILY
Refills: 0 | Status: CANCELLED | OUTPATIENT
Start: 2017-07-14

## 2017-07-14 NOTE — TELEPHONE ENCOUNTER
Reason for Call:  Medication or medication refill:    Do you use a [facility]?  Name of the pharmacy and phone number for the current request:  [facility] - [place], [place] - [address]    Name of the medication requested: Lisdexamfetamine Dimesylate (VYVANSE PO)    Other request: Patient's TCU is requesting a refill on the medication above. Please follow up. Thanks!!    Can we leave a detailed message on this number? YES    Phone number patient can be reached at: Other phone number:  [phone]    Best Time: Any    Call taken on 7/14/2017 at 3:16 PM by Dang Mojica

## 2017-07-14 NOTE — TELEPHONE ENCOUNTER
Controlled Substance Refill Request for Lisdexamfetamine Dimesylate 50 mg (VYVANSE PO)  Problem List Complete:  No     PROVIDER TO CONSIDER COMPLETION OF PROBLEM LIST AND OVERVIEW/CONTROLLED SUBSTANCE AGREEMENT    Last Written Prescription Date:  Historical  Last Fill Quantity: Historical,   # refills: Historical    Last Office Visit with [facility] primary care provider: 6/28/2017    Future Office visit:     Controlled substance agreement on file: No.     Processing:  hard copy to be picked up or mailed to pharmacy of choice   checked in past 6 months?  No, route to RN no chronic pain    Thank you,  Jessi Alex RN

## 2017-07-17 ENCOUNTER — OFFICE VISIT - HEALTHEAST (OUTPATIENT)
Dept: GERIATRICS | Facility: CLINIC | Age: 62
End: 2017-07-17

## 2017-07-17 DIAGNOSIS — R19.00 PELVIC MASS: ICD-10-CM

## 2017-07-17 DIAGNOSIS — I82.409 DVT (DEEP VENOUS THROMBOSIS) (H): ICD-10-CM

## 2017-07-17 DIAGNOSIS — G89.18 POST-OP PAIN: ICD-10-CM

## 2017-07-17 DIAGNOSIS — I89.0 LYMPHEDEMA: ICD-10-CM

## 2017-07-17 DIAGNOSIS — F32.A DEPRESSIVE DISORDER: ICD-10-CM

## 2017-07-17 DIAGNOSIS — E11.9 DIABETES MELLITUS, TYPE 2 (H): ICD-10-CM

## 2017-07-17 DIAGNOSIS — L02.416 ABSCESS OF LEFT LOWER EXTREMITY: ICD-10-CM

## 2017-07-17 DIAGNOSIS — F41.9 ANXIETY: ICD-10-CM
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
## 2017-07-17 DIAGNOSIS — S71.152D DOG BITE OF LEFT THIGH, SUBSEQUENT ENCOUNTER: ICD-10-CM

## 2017-07-18 ENCOUNTER — TRANSFERRED RECORDS (OUTPATIENT)
Dept: HEALTH INFORMATION MANAGEMENT | Facility: CLINIC | Age: 62
End: 2017-07-18

## 2017-07-18 ENCOUNTER — OFFICE VISIT - HEALTHEAST (OUTPATIENT)
Dept: GERIATRICS | Facility: CLINIC | Age: 62
End: 2017-07-18

## 2017-07-18 DIAGNOSIS — E11.9 DIABETES MELLITUS, TYPE 2 (H): ICD-10-CM

## 2017-07-18 DIAGNOSIS — F32.A DEPRESSIVE DISORDER: ICD-10-CM

## 2017-07-18 DIAGNOSIS — I82.409 DVT (DEEP VENOUS THROMBOSIS) (H): ICD-10-CM

## 2017-07-18 DIAGNOSIS — R19.00 PELVIC MASS: ICD-10-CM

## 2017-07-18 DIAGNOSIS — G89.18 POST-OP PAIN: ICD-10-CM

## 2017-07-18 DIAGNOSIS — F41.9 ANXIETY: ICD-10-CM

## 2017-07-18 DIAGNOSIS — L02.416 ABSCESS OF LEFT LOWER EXTREMITY: ICD-10-CM

## 2017-07-18 NOTE — TELEPHONE ENCOUNTER
Routing to  Reception - please advise patient to follow up with psychiatry for refills    Thank you,  Jessi Alex RN

## 2017-07-20 ENCOUNTER — OFFICE VISIT - HEALTHEAST (OUTPATIENT)
Dept: GERIATRICS | Facility: CLINIC | Age: 62
End: 2017-07-20

## 2017-07-20 DIAGNOSIS — G89.18 POST-OP PAIN: ICD-10-CM

## 2017-07-20 DIAGNOSIS — F43.9 STRESS: ICD-10-CM

## 2017-07-20 DIAGNOSIS — E11.9 DIABETES MELLITUS (H): ICD-10-CM

## 2017-07-20 DIAGNOSIS — I87.009 POST-THROMBOTIC SYNDROME: Primary | ICD-10-CM

## 2017-07-20 DIAGNOSIS — I10 HYPERTENSION: ICD-10-CM

## 2017-07-20 DIAGNOSIS — Z09 STATUS POST ABDOMINAL SURGERY, FOLLOW-UP EXAM: ICD-10-CM

## 2017-07-20 DIAGNOSIS — I82.409 DVT (DEEP VENOUS THROMBOSIS) (H): ICD-10-CM

## 2017-07-20 DIAGNOSIS — I82.4Z2 DEEP VEIN THROMBOSIS (DVT) OF DISTAL VEIN OF LEFT LOWER EXTREMITY, UNSPECIFIED CHRONICITY (H): ICD-10-CM

## 2017-07-25 ENCOUNTER — OFFICE VISIT - HEALTHEAST (OUTPATIENT)
Dept: GERIATRICS | Facility: CLINIC | Age: 62
End: 2017-07-25
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
## 2017-07-25 DIAGNOSIS — Z87.898 HISTORY OF PELVIC MASS: ICD-10-CM

## 2017-07-25 DIAGNOSIS — I89.0 LYMPHEDEMA: ICD-10-CM

## 2017-07-25 DIAGNOSIS — Z86.718 HISTORY OF DVT (DEEP VEIN THROMBOSIS): ICD-10-CM

## 2017-07-25 DIAGNOSIS — S71.152D DOG BITE OF LEFT THIGH, SUBSEQUENT ENCOUNTER: ICD-10-CM

## 2017-07-25 DIAGNOSIS — F41.9 ANXIETY: ICD-10-CM

## 2017-07-25 DIAGNOSIS — E11.9 DIABETES MELLITUS, TYPE 2 (H): ICD-10-CM

## 2017-07-25 ASSESSMENT — MIFFLIN-ST. JEOR: SCORE: 1452.82

## 2017-07-28 ENCOUNTER — OFFICE VISIT - HEALTHEAST (OUTPATIENT)
Dept: GERIATRICS | Facility: CLINIC | Age: 62
End: 2017-07-28

## 2017-07-28 ENCOUNTER — MEDICAL CORRESPONDENCE (OUTPATIENT)
Dept: HEALTH INFORMATION MANAGEMENT | Facility: CLINIC | Age: 62
End: 2017-07-28

## 2017-07-28 DIAGNOSIS — F41.9 ANXIETY: ICD-10-CM

## 2017-07-28 DIAGNOSIS — F98.8 ADD (ATTENTION DEFICIT DISORDER) WITHOUT HYPERACTIVITY: ICD-10-CM

## 2017-07-28 DIAGNOSIS — Z86.718 HISTORY OF DVT (DEEP VEIN THROMBOSIS): ICD-10-CM

## 2017-07-28 DIAGNOSIS — Z87.898 HISTORY OF PELVIC MASS: ICD-10-CM

## 2017-07-28 DIAGNOSIS — S71.152D DOG BITE OF LEFT THIGH, SUBSEQUENT ENCOUNTER: ICD-10-CM
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
## 2017-07-28 DIAGNOSIS — I89.0 LYMPHEDEMA: ICD-10-CM

## 2017-07-28 DIAGNOSIS — E11.9 DIABETES MELLITUS, TYPE 2 (H): ICD-10-CM

## 2017-07-28 ASSESSMENT — MIFFLIN-ST. JEOR: SCORE: 1437.4

## 2017-07-31 ENCOUNTER — CARE COORDINATION (OUTPATIENT)
Dept: ONCOLOGY | Facility: CLINIC | Age: 62
End: 2017-07-31

## 2017-07-31 ENCOUNTER — AMBULATORY - HEALTHEAST (OUTPATIENT)
Dept: GERIATRICS | Facility: CLINIC | Age: 62
End: 2017-07-31

## 2017-07-31 ENCOUNTER — ONCOLOGY VISIT (OUTPATIENT)
Dept: ONCOLOGY | Facility: CLINIC | Age: 62
End: 2017-07-31
Attending: OBSTETRICS & GYNECOLOGY
Payer: COMMERCIAL

## 2017-07-31 VITALS
DIASTOLIC BLOOD PRESSURE: 77 MMHG | WEIGHT: 191.4 LBS | RESPIRATION RATE: 18 BRPM | HEART RATE: 85 BPM | OXYGEN SATURATION: 99 % | SYSTOLIC BLOOD PRESSURE: 124 MMHG | HEIGHT: 64 IN | BODY MASS INDEX: 32.68 KG/M2 | TEMPERATURE: 98.3 F

## 2017-07-31 DIAGNOSIS — Z85.43 PERSONAL HISTORY OF OVARIAN CANCER: Primary | ICD-10-CM

## 2017-07-31 PROCEDURE — 99354 ZZC PROLONGED SERV,OFFICE,1ST HR: CPT | Mod: 24 | Performed by: OBSTETRICS & GYNECOLOGY

## 2017-07-31 PROCEDURE — 99215 OFFICE O/P EST HI 40 MIN: CPT | Mod: 24 | Performed by: OBSTETRICS & GYNECOLOGY

## 2017-07-31 PROCEDURE — 99212 OFFICE O/P EST SF 10 MIN: CPT | Mod: ZF

## 2017-07-31 ASSESSMENT — PAIN SCALES - GENERAL: PAINLEVEL: MILD PAIN (3)

## 2017-07-31 NOTE — NURSING NOTE
"Oncology Rooming Note    July 31, 2017 2:49 PM   Hafsa Corona is a 62 year old female who presents for:    Chief Complaint   Patient presents with     Oncology Clinic Visit     return patient visit for post op follow up related to S/P laparotomy     Initial Vitals: /77  Pulse 85  Temp 98.3  F (36.8  C) (Oral)  Resp 18  Ht 1.613 m (5' 3.5\")  Wt 86.8 kg (191 lb 6.4 oz)  SpO2 99%  BMI 33.37 kg/m2 Estimated body mass index is 33.37 kg/(m^2) as calculated from the following:    Height as of this encounter: 1.613 m (5' 3.5\").    Weight as of this encounter: 86.8 kg (191 lb 6.4 oz). Body surface area is 1.97 meters squared.  Mild Pain (3) Comment: abdomen and pelvic    No LMP recorded. Patient is postmenopausal.  Allergies reviewed: Yes  Medications reviewed: Yes    Medications: Medication refills not needed today.  Pharmacy name entered into [facility]:    [id number] - [place], [place] - [address] AT [facility] - [place], [place] - [address]    Clinical concerns: abdomen and pelvic  dr. gross was notified.    5 minutes for nursing intake (face to face time)     Esther Choi CMA              "

## 2017-07-31 NOTE — MR AVS SNAPSHOT
After Visit Summary   7/31/2017    Hafsa Corona    MRN: [id number]           Patient Information     Date Of Birth          1955        Visit Information        Provider Department      7/31/2017 3:00 PM Jarod Shaffer MD [facility]        Today's Diagnoses     Personal history of ovarian cancer    -  1       Follow-ups after your visit        Your next 10 appointments already scheduled     Aug 11, 2017  7:30 AM CDT   [facility] Lab Draw with  [facility] LAB DRAW   [facility] Lab Draw ([facility])    [address] [place] [address]   [phone]            Aug 11, 2017  8:00 AM CDT   Infusion 360 with  ONCOLOGY INFUSION, UC 14 ATC   [facility] ([facility])    [address] [place] [address]   [phone]            Aug 11, 2017 12:45 PM CDT   LAB with  LAB   [facility] Lab [facility])    [address] [place] [id number] [phone]           Patient must bring picture ID. Patient should be prepared to give a urine specimen  Please do not eat 10-12 hours before your appointment if you are coming in fasting for labs on lipids, cholesterol, or glucose (sugar). Pregnant women should follow their Care Team instructions. Water with medications is okay. Do not drink coffee or other fluids. If you have concerns about taking  your medications, please ask at office or if scheduling via [employer], send a message by clicking on Secure Messaging, Message Your Care Team.            Aug 11, 2017  1:00 PM CDT   US LOWER EXTREMITY VENOUS DUPLEX LEFT with UCUSV1   [facility] Imaging Center US ([facility])    [address] [place] [id number] [phone]           Please bring a list of your medicines (including vitamins, minerals and over-the-counter drugs).  Also, tell your doctor about any allergies you may have. Wear comfortable clothes and leave your valuables at home.  You do not need to do anything special to prepare for your exam.  Please call the Imaging Department at your exam site with any questions.            Aug 11, 2017  1:30 PM CDT   US LOWER EXTREMITY VENOUS DUPLEX RIGHT with UCUSV1   [facility] Imaging Center US ([facility])    [address] [place] [address]  1st [facility] [id number] [phone]           Please bring a list of your medicines (including vitamins, minerals and over-the-counter drugs). Also, tell your doctor about any allergies you may have. Wear comfortable clothes and leave your valuables at home.  You do not need to do anything special to prepare for your exam.  Please call the Imaging Department at your exam site with any questions.            Aug 11, 2017  2:00 PM CDT   (Arrive by 1:45 PM)   RETURN CLOTTING DISORDER with William Villarreal MD   [facility] Bleeding and Clotting ([facility])    [address] [place] [address]  3rd [facility] [address]   [phone]            Sep 01, 2017  7:45 AM CDT   [facility] Lab Draw with  MASONIC LAB DRAW   [facility] Lab Draw ([facility])    [address] [place] [address]   [phone]            Sep 01, 2017  8:20 AM CDT   (Arrive by 8:05 AM)   Return Active Treatment with MARLON Ogden CNP   [facility] Cancer [facility] ([facility])    [address] [place] [address]   [phone]            Sep 01, 2017  9:00 AM CDT   Infusion 360 with  ONCOLOGY INFUSION   [facility] ([facility])    [address] [place] [address]   [phone]              Future tests that were ordered for you today     Open Future Orders        Priority Expected Expires  "Ordered    CT Chest w contrast Routine  8/3/2018 8/3/2017            Who to contact     If you have questions or need follow up information about today's clinic visit or your schedule please contact [facility] directly at [phone].  Normal or non-critical lab and imaging results will be communicated to you by MyChart, letter or phone within 4 business days after the clinic has received the results. If you do not hear from us within 7 days, please contact the clinic through [employer] or phone. If you have a critical or abnormal lab result, we will notify you by phone as soon as possible.  Submit refill requests through [employer] or call your pharmacy and they will forward the refill request to us. Please allow 3 business days for your refill to be completed.          Additional Information About Your Visit        [employer] Information     [employer] lets you send messages to your doctor, view your test results, renew your prescriptions, schedule appointments and more. To sign up, go to [email or web address] . Click on \"Log in\" on the left side of the screen, which will take you to the Welcome page. Then click on \"Sign up Now\" on the right side of the page.     You will be asked to enter the access code listed below, as well as some personal information. Please follow the directions to create your username and password.     Your access code is: [email or web address]  Expires: 10/10/2017 11:16 AM     Your access code will  in 90 days. If you need help or a new code, please call your [facility] clinic or [phone].        Care EveryWhere ID     This is your Care EveryWhere ID. This could be used by other organizations to access your [facility] medical records  [id number]        Your Vitals Were     Pulse Temperature Respirations Height Pulse Oximetry BMI (Body Mass Index)    85 98.3  F (36.8  C) (Oral) 18 1.613 m (5' 3.5\") 99% 33.37 kg/m2       Blood Pressure from Last 3 Encounters:   17 101/68 "   07/31/17 124/77   07/12/17 116/68    Weight from Last 3 Encounters:   08/02/17 83.9 kg (185 lb)   07/31/17 86.8 kg (191 lb 6.4 oz)   07/12/17 101.5 kg (223 lb 11.2 oz)                 Today's Medication Changes          These changes are accurate as of: 7/31/17 11:59 PM.  If you have any questions, ask your nurse or doctor.               These medicines have changed or have updated prescriptions.        Dose/Directions    metFORMIN 500 MG 24 hr tablet   Commonly known as:  GLUCOPHAGE-XR   This may have changed:    - how much to take  - when to take this  - additional instructions   Used for:  Type 2 diabetes mellitus without complication, without long-term current use of insulin (H)        Take two tabs by mouth once daily with evening meal.   Quantity:  180 tablet   Refills:  PRN                Primary Care Provider Office Phone # Fax #    Morelia JAVIER Ayon [phone]       [facility] [address] [facility] [address]        Equal Access to Services     [facility]: Hadii serena ku hadasho Soomaali, waaxda luqadaha, qaybta kaalmada adeegyada, waxay mini hayalessandro betts . So [facility] [phone].    ATENCIÓN: Si habla español, tiene a martínez disposición servicios gratuitos de asistencia lingüística. [facility] [phone].    We comply with applicable federal civil rights laws and [place] laws. We do not discriminate on the basis of race, color, national origin, age, disability sex, sexual orientation or gender identity.            Thank you!     Thank you for choosing [facility]  for your care. Our goal is always to provide you with excellent care. Hearing back from our patients is one way we can continue to improve our services. Please take a few minutes to complete the written survey that you may receive in the mail after your visit with us. Thank you!             Your Updated Medication List - Protect others around you: Learn how to safely use,  store and throw away your medicines at www.disposemymeds.org.          This list is accurate as of: 7/31/17 11:59 PM.  Always use your most recent med list.                   Brand Name Dispense Instructions for use Diagnosis    acetaminophen 325 MG tablet    TYLENOL    100 tablet    Take 2 tablets (650 mg) by mouth every 6 hours as needed for mild pain    Cellulitis and abscess of leg, Mass of pelvis       albuterol 108 (90 BASE) MCG/ACT Inhaler    albuterol    1 Inhaler    Inhale 2 puffs into the lungs 4 times daily as needed for shortness of breath / dyspnea    Cough       atorvastatin 80 MG tablet    LIPITOR    90 tablet    Take 1 tablet (80 mg) by mouth daily    Hyperlipidemia LDL goal <100       CALCIUM-MAGNESIUM-VITAMIN D PO      Take 2 tablets by mouth daily        DIGESTIVE ENZYME PO      Take 1 capsule by mouth as needed        EPINEPHrine 0.3 MG/0.3ML injection 2-pack    EPIPEN/ADRENACLICK/or ANY BX GENERIC EQUIV    1 each    Inject 0.3 mLs (0.3 mg) into the muscle once as needed for anaphylaxis    Bee allergy status       escitalopram 20 MG tablet    LEXAPRO    30 tablet    Take 1 tablet (20 mg) by mouth daily    Major depressive disorder, recurrent episode, mild (H)       fluticasone 50 MCG/ACT spray    FLONASE    48 g    Spray 2 sprays into both nostrils daily    Chronic maxillary sinusitis       lisinopril 10 MG tablet    PRINIVIL/ZESTRIL    90 tablet    Take 1 tablet (10 mg) by mouth daily    Essential hypertension       metFORMIN 500 MG 24 hr tablet    GLUCOPHAGE-XR    180 tablet    Take two tabs by mouth once daily with evening meal.    Type 2 diabetes mellitus without complication, without long-term current use of insulin (H)       MULTIVITAMIN ADULT PO      Take 1 tablet by mouth daily        order for DME     1 Units    Equipment being ordered: blood pressure monitor    Essential hypertension       order for DME     1 each    Compression stockings 20-30 mm Hg. To be wear when directed by  Hematology team and while awake for the first two years post clot.    Long-term (current) use of anticoagulants, Acute deep vein thrombosis (DVT) of left lower extremity, unspecified vein (H)       polyethylene glycol Packet    MIRALAX/GLYCOLAX    7 packet    Take 17 g by mouth daily    Mass of pelvis       PRO-BIOTIC BLEND PO      Take 1 capsule by mouth daily Enzymatic Therapy-probiotic pearls        rivaroxaban ANTICOAGULANT 20 MG Tabs tablet    XARELTO    30 tablet    Take 1 tablet (20 mg) by mouth daily (with dinner)    Long-term (current) use of anticoagulants, Acute deep vein thrombosis (DVT) of left lower extremity, unspecified vein (H)       senna-docusate 8.6-50 MG per tablet    SENOKOT-S;PERICOLACE    100 tablet    Take 2 tablets by mouth 2 times daily Start with 1 tablet PO BID, If no bowel movement in 24 hours, increase to 2 tablets PO BID.  Hold for loose stools.    Mass of pelvis       vitamin B complex with vitamin C Tabs tablet      Take 1 tablet by mouth daily        vitamin D 1000 UNITS capsule      Take 2,000 Units by mouth daily        VYVANSE PO      Take 50 mg by mouth daily

## 2017-07-31 NOTE — PATIENT INSTRUCTIONS
Call your Care Coordinator if you have chills and/or temperature greater then or equal to 100.4, uncontrolled nausea and vomiting, diarrhea, constipation, dizziness, light headedness, shortness of breath, chest pain, unexplained bruising, bleeding that does not stop with 10 minutes of pressure or any new symptoms, questions/concerns  Monday- Friday.    If after hours, weekends or holidays call the [facility] hospital at [phone] and ask for the GYN ONCOLOGY resident on call.    Your  Care Coordinator is    Shanon SMITH, RN  Gynecologic Cancer   Office:  [phone]  Pager: [phone] #[phone]

## 2017-07-31 NOTE — LETTER
2017       RE: Hafsa Corona  [address]  [place] [address]     Dear Colleague,    Thank you for referring your patient, Hafsa Corona, to the [facility]. Please see a copy of my visit note below.                            Consult Notes on Referred Patient    Date: 2017       Dr. Morelia Ayon, NP  [facility]  [address]  [place] [address]       RE: Hafsa Corona  : 1955  CLIFF: 2017    Dear Dr. Morelia Ayon:    I had the pleasure of seeing your patient Hafsa Corona here at the Gynecologic Cancer Clinic at the [facility] on 2017.  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  Given these findings she was subsequently sent to the Gynecologic Cancer Clinic for new patient consultation.   The patient has had a recent episode of lower abdominal pain in early July.  She was subsequently sent to the emergency room and was found to have a large 9 cm complex ovarian mass.  She was admitted to the hospital for pain control and underwent an exploratory laparotomy, bilateral salpingo-oophorectomy, omentectomy, pelvic and periaortic lymphadenectomy, as well as hysterectomy by Dr. Cole.  She has recovered well.  Procedure was about 3 weeks ago.  She now does not have any nausea, vomiting, fever or chills.  Normal urinary and bowel function.  No B symptoms.                Past Medical History:    Past Medical History:   Diagnosis Date     Anxiety state, unspecified     [id number]     Attention deficit disorder without mention of hyperactivity      Chronic rhinitis      Depressive disorder, not elsewhere classified      Panic disorder without agoraphobia      Pure hypercholesterolemia     Lipitor     Tachycardia, unspecified     1999-2004     Type II or unspecified type diabetes mellitus without mention of complication, not stated as  uncontrolled     diagnosed 2004         Past Surgical History:    Past Surgical History:   Procedure Laterality Date     HYSTERECTOMY TOTAL ABDOMINAL, BILATERAL SALPINGO-OOPHORECTOMY, NODE DISSECTION, COMBINED Left 7/7/2017    Procedure: COMBINED HYSTERECTOMY TOTAL ABDOMINAL, SALPINGO-OOPHORECTOMY, NODE DISSECTION;  Exploratory Laparotomy, Left Salpingo-Oophorectomy, Cancer Staging, Total Hysterectomy, Omentectomy, Evacuation of abdominal fluid, Lymph Node Dissection  Anesthesia Block ;  Surgeon: Serena Cole MD;  Location: [facility] OR     HYSTERECTOMY, PAP NO LONGER INDICATED  07/07/2017    Laparotomy; for ovarian cancer staging     LYMPHADENECTOMY RETROPERITONEAL Bilateral 07/07/2017    Laparotomy; pelvics & para-aortics; for ovarian cancer staging     OMENTECTOMY  07/07/2017    Laparotomy; for ovarian cancer staging     SALPINGO OOPHORECTOMY,R/L/KAYY Right 2008    Salpingo Oophorectomy, RT     SALPINGO OOPHORECTOMY,R/L/KAYY Left 07/07/2017    Laparotomy; for ovarian cancer staging     SURGICAL HISTORY OF -       facial surgery d/t fall in 1/2002     SURGICAL HISTORY OF -       1985 removal of breast cyst     SURGICAL HISTORY OF -   2008    endometrial ablation         Health Maintenance:  Health Maintenance Due   Topic Date Due     ADVANCE DIRECTIVE PLANNING Q5 YRS  03/09/2009     MAMMO SCREEN Q2 YR (SYSTEM ASSIGNED)  12/01/2016     EYE EXAM Q1 YEAR  07/01/2017           Current Medications:     has a current medication list which includes the following prescription(s): gabapentin, hydromorphone, multiple vitamins-minerals, vitamin b complex with vitamin c, rivaroxaban anticoagulant, probiotic product, atorvastatin, order for dme, lisinopril, metformin, escitalopram, fluticasone, vitamin d, acetaminophen, polyethylene glycol, senna-docusate, lisdexamfetamine dimesylate, order for dme, digestive enzymes, calcium-magnesium-vitamin d, epinephrine, and albuterol.       Allergies:     [unfilled]        Social  "History:     Social History   Substance Use Topics     Smoking status: Never Smoker     Smokeless tobacco: Never Used     Alcohol use Yes      Comment: rarely       History   Drug Use No           Family History:       Family History   Problem Relation Age of Onset     C.A.D. Father      DIABETES Father      Hypertension Father      CEREBROVASCULAR DISEASE Father      Psychotic Disorder Father      Pancreatic Cancer Father      C.A.D. Mother      Hypertension Mother      Breast Cancer Mother      Psychotic Disorder Mother      Colon Cancer Mother      CANCER Mother      Bone cancer     Prostate Problems Brother      cleared      Psychotic Disorder Sister      x2     Neurologic Disorder Sister      Psychotic Disorder Brother      x2     Depression Brother      major depressive disorder and OCD     Anxiety Disorder Brother      MENTAL ILLNESS Brother      Psychotic Disorder Maternal Grandmother      ?     Psychotic Disorder Maternal Grandfather      ?     Psychotic Disorder Paternal Grandmother      Schizophernia     Psychotic Disorder Paternal Grandfather      ?     Respiratory Paternal Grandfather       of emphysema and smoking     Psychotic Disorder Sister      Other - See Comments Sister      small kidney stone      Cancer - colorectal No family hx of          Physical Exam:     /77  Pulse 85  Temp 98.3  F (36.8  C) (Oral)  Resp 18  Ht 1.613 m (5' 3.5\")  Wt 86.8 kg (191 lb 6.4 oz)  SpO2 99%  BMI 33.37 kg/m2  Body mass index is 33.37 kg/(m^2).    General Appearance: healthy and alert, no distress     ABDOMEN:  Soft, nontender, nondistended.  No organomegaly.  Well-healing midline incision.   PELVIC:  Normal external genitalia, normal vaginal mucosa.  Well-healing vaginal cuff.  No masses or tenderness.  Rectovaginal confirms, as well as intact cuff.             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     A total of 90 " minutes was spent with the patient, 60 minutes of which were spent in counseling the patient and/or treatment planning.    ASSESSMENT AND PLAN:   1.  Stage IC2 clear cell carcinoma of the ovary.   2.  Ashkenazi Christian heritage.   3.  Class I obesity.      I discussed with the patient that given the high risk histology, as well as ruptured mass before surgery, she would be qualified at higher risk of recurrence despiteearly stage ovarian cancer.  I recommend adjuvant chemotherapy.  We discussed the results of the  and would treat based on that, as she would fit that patient population.  We will do carboplatin of AUC of 6 and paclitaxel of 175, m2 for 3 cycles.  I will also have her see a genetic counselor and will see her back after those 3 cycles with a 3-month followup with a CA-125 at that point.  The patient, as well as her friend, agree with this plan.  They were very appreciative of her care.  All questions were answered.             Thank you for allowing us to participate in the care of your patient.         Sincerely,    Jarod Shaffer MD, MS    Department of Obstetrics and Gynecology   Division of Gynecologic Oncology   [facility]  Phone: [phone]    CC  Patient Care Team:  Morelia Ayon NP as PCP - General

## 2017-08-01 ENCOUNTER — HOSPITAL ENCOUNTER (OUTPATIENT)
Dept: PHYSICAL THERAPY | Facility: CLINIC | Age: 62
Setting detail: THERAPIES SERIES
End: 2017-08-01
Attending: PHYSICIAN ASSISTANT
Payer: COMMERCIAL

## 2017-08-01 ENCOUNTER — CARE COORDINATION (OUTPATIENT)
Dept: ONCOLOGY | Facility: CLINIC | Age: 62
End: 2017-08-01

## 2017-08-01 PROBLEM — C56.2 OVARIAN CANCER, LEFT (H): Status: ACTIVE | Noted: 2017-08-01

## 2017-08-01 PROBLEM — Z79.899 ENCOUNTER FOR LONG-TERM (CURRENT) USE OF MEDICATIONS: Status: ACTIVE | Noted: 2017-08-01

## 2017-08-01 PROCEDURE — 40000449 ZZHC STATISTIC PT VISIT, LYMPHEDEMA: Performed by: PHYSICAL THERAPIST

## 2017-08-01 PROCEDURE — 97535 SELF CARE MNGMENT TRAINING: CPT | Mod: GP | Performed by: PHYSICAL THERAPIST

## 2017-08-01 PROCEDURE — 97110 THERAPEUTIC EXERCISES: CPT | Mod: GP | Performed by: PHYSICAL THERAPIST

## 2017-08-01 PROCEDURE — 97162 PT EVAL MOD COMPLEX 30 MIN: CPT | Mod: GP | Performed by: PHYSICAL THERAPIST

## 2017-08-01 PROCEDURE — 97140 MANUAL THERAPY 1/> REGIONS: CPT | Mod: GP | Performed by: PHYSICAL THERAPIST

## 2017-08-01 NOTE — PROGRESS NOTES
[facility] GYN-Oncology Teaching Note    Relevant Diagnosis:  Ovarian Cancer    Teaching Topic:  Chemotherapy    Person(s) involved in teaching:  Patient and friend    Motivation Level:   Asks Questions:   Yes  Eager to Learn:  Yes  Cooperative:  Yes  Receptive (willing/able to accept information):  Yes  Comments:  Patient anxious, crying    Patient demonstrates understanding of the following:  Reason for the appointment, diagnosis and treatment plan:  Yes  Knowledge of proper use of medications and conditions for which they are ordered (with special attention to potential side effects or drug interactions):  Yes  Which situations necessitate calling provider and whom to contact:  Yes    Teaching Concerns:  Plan Taxol 175 mg /m2/Carbo AUC 6 x 3 cycles, then patient to return in 3 months to Dr. Shaffer's clinic with Ca-125.  Patient has appointments scheduled 8/11/17 and would like to start that day if possible.  Patient would also like appointment with Dr. Vasquez.  Message sent to Ana Cristina Bernard to contact patient.  Message sent to scheduling for genetic counseling, CT scan appointments.       Instructional Materials Used/Given:  Chemotherapy Packet and Cards  Disease Packet   Cancer Handbook  Care Coordinator Cards and after hours contact information    Time spent teaching with patient:  60 minutes    Shanon Oswald MSN, RN  Care Coordinator  Gynecologic Cancer   Office:  [phone]  Pager: [phone] #[phone]

## 2017-08-01 NOTE — PROGRESS NOTES
Care Coordinator Note  Left message for patient with chemotherapy schedule.  Stated that I would call her again on Thursday to answer any questions.      Shanon Oswald MSN, RN  Care Coordinator  Gynecologic Cancer   Office:  [phone]  Pager: [phone] #[phone]

## 2017-08-01 NOTE — PROGRESS NOTES
Consult Notes on Referred Patient    Date: 2017       Dr. Morelia Ayon, NP  [facility]  [address] [place] [address]       RE: Hafsa Corona  : 1955  CLIFF: 2017    Dear Dr. Morelia Ayon:    I had the pleasure of seeing your patient Hafsa Corona here at the Gynecologic Cancer Clinic at the [facility] on 2017.  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  Given these findings she was subsequently sent to the Gynecologic Cancer Clinic for new patient consultation.   The patient has had a recent episode of lower abdominal pain in early July.  She was subsequently sent to the emergency room and was found to have a large 9 cm complex ovarian mass.  She was admitted to the hospital for pain control and underwent an exploratory laparotomy, bilateral salpingo-oophorectomy, omentectomy, pelvic and periaortic lymphadenectomy, as well as hysterectomy by Dr. Cole.  She has recovered well.  Procedure was about 3 weeks ago.  She now does not have any nausea, vomiting, fever or chills.  Normal urinary and bowel function.  No B symptoms.                Past Medical History:    Past Medical History:   Diagnosis Date     Anxiety state, unspecified     [id number]     Attention deficit disorder without mention of hyperactivity      Chronic rhinitis      Depressive disorder, not elsewhere classified      Panic disorder without agoraphobia      Pure hypercholesterolemia     Lipitor     Tachycardia, unspecified     1999-2004     Type II or unspecified type diabetes mellitus without mention of complication, not stated as uncontrolled     diagnosed          Past Surgical History:    Past Surgical History:   Procedure Laterality Date     HYSTERECTOMY TOTAL ABDOMINAL, BILATERAL SALPINGO-OOPHORECTOMY, NODE DISSECTION, COMBINED Left 2017    Procedure: COMBINED  HYSTERECTOMY TOTAL ABDOMINAL, SALPINGO-OOPHORECTOMY, NODE DISSECTION;  Exploratory Laparotomy, Left Salpingo-Oophorectomy, Cancer Staging, Total Hysterectomy, Omentectomy, Evacuation of abdominal fluid, Lymph Node Dissection  Anesthesia Block ;  Surgeon: Serena Cole MD;  Location: [facility] OR     HYSTERECTOMY, PAP NO LONGER INDICATED  07/07/2017    Laparotomy; for ovarian cancer staging     LYMPHADENECTOMY RETROPERITONEAL Bilateral 07/07/2017    Laparotomy; pelvics & para-aortics; for ovarian cancer staging     OMENTECTOMY  07/07/2017    Laparotomy; for ovarian cancer staging     SALPINGO OOPHORECTOMY,R/L/KAYY Right 2008    Salpingo Oophorectomy, RT     SALPINGO OOPHORECTOMY,R/L/KAYY Left 07/07/2017    Laparotomy; for ovarian cancer staging     SURGICAL HISTORY OF -       facial surgery d/t fall in 1/2002     SURGICAL HISTORY OF -       1985 removal of breast cyst     SURGICAL HISTORY OF -   2008    endometrial ablation         Health Maintenance:  Health Maintenance Due   Topic Date Due     ADVANCE DIRECTIVE PLANNING Q5 YRS  03/09/2009     MAMMO SCREEN Q2 YR (SYSTEM ASSIGNED)  12/01/2016     EYE EXAM Q1 YEAR  07/01/2017           Current Medications:     has a current medication list which includes the following prescription(s): gabapentin, hydromorphone, multiple vitamins-minerals, vitamin b complex with vitamin c, rivaroxaban anticoagulant, probiotic product, atorvastatin, order for dme, lisinopril, metformin, escitalopram, fluticasone, vitamin d, acetaminophen, polyethylene glycol, senna-docusate, lisdexamfetamine dimesylate, order for dme, digestive enzymes, calcium-magnesium-vitamin d, epinephrine, and albuterol.       Allergies:     [unfilled]        Social History:     Social History   Substance Use Topics     Smoking status: Never Smoker     Smokeless tobacco: Never Used     Alcohol use Yes      Comment: rarely       History   Drug Use No           Family History:       Family History   Problem Relation  "Age of Onset     C.A.D. Father      DIABETES Father      Hypertension Father      CEREBROVASCULAR DISEASE Father      Psychotic Disorder Father      Pancreatic Cancer Father      C.A.D. Mother      Hypertension Mother      Breast Cancer Mother      Psychotic Disorder Mother      Colon Cancer Mother      CANCER Mother      Bone cancer     Prostate Problems Brother      cleared      Psychotic Disorder Sister      x2     Neurologic Disorder Sister      Psychotic Disorder Brother      x2     Depression Brother      major depressive disorder and OCD     Anxiety Disorder Brother      MENTAL ILLNESS Brother      Psychotic Disorder Maternal Grandmother      ?     Psychotic Disorder Maternal Grandfather      ?     Psychotic Disorder Paternal Grandmother      Schizophernia     Psychotic Disorder Paternal Grandfather      ?     Respiratory Paternal Grandfather       of emphysema and smoking     Psychotic Disorder Sister      Other - See Comments Sister      small kidney stone      Cancer - colorectal No family hx of          Physical Exam:     /77  Pulse 85  Temp 98.3  F (36.8  C) (Oral)  Resp 18  Ht 1.613 m (5' 3.5\")  Wt 86.8 kg (191 lb 6.4 oz)  SpO2 99%  BMI 33.37 kg/m2  Body mass index is 33.37 kg/(m^2).    General Appearance: healthy and alert, no distress     ABDOMEN:  Soft, nontender, nondistended.  No organomegaly.  Well-healing midline incision.   PELVIC:  Normal external genitalia, normal vaginal mucosa.  Well-healing vaginal cuff.  No masses or tenderness.  Rectovaginal confirms, as well as intact cuff.             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     A total of 90 minutes was spent with the patient, 60 minutes of which were spent in counseling the patient and/or treatment planning.    ASSESSMENT AND PLAN:   1.  Stage IC2 clear cell carcinoma of the ovary.   2.  Ashkenazi Baptism heritage.   3.  Class I obesity.    "   I discussed with the patient that given the high risk histology, as well as ruptured mass before surgery, she would be qualified at higher risk of recurrence despiteearly stage ovarian cancer.  I recommend adjuvant chemotherapy.  We discussed the results of the  and would treat based on that, as she would fit that patient population.  We will do carboplatin of AUC of 6 and paclitaxel of 175, m2 for 3 cycles.  I will also have her see a genetic counselor and will see her back after those 3 cycles with a 3-month followup with a CA-125 at that point.  The patient, as well as her friend, agree with this plan.  They were very appreciative of her care.  All questions were answered.             Thank you for allowing us to participate in the care of your patient.         Sincerely,    Jarod Shaffer MD, MS    Department of Obstetrics and Gynecology   Division of Gynecologic Oncology   [facility]  Phone: [phone]    CC  Patient Care Team:  Morelia Ayon, NP as PCP - General  MORELIA AYON

## 2017-08-02 ENCOUNTER — TELEPHONE (OUTPATIENT)
Dept: FAMILY MEDICINE | Facility: CLINIC | Age: 62
End: 2017-08-02

## 2017-08-02 ENCOUNTER — OFFICE VISIT (OUTPATIENT)
Dept: FAMILY MEDICINE | Facility: CLINIC | Age: 62
End: 2017-08-02
Payer: COMMERCIAL

## 2017-08-02 VITALS
SYSTOLIC BLOOD PRESSURE: 101 MMHG | HEIGHT: 63 IN | HEART RATE: 91 BPM | WEIGHT: 185 LBS | BODY MASS INDEX: 32.78 KG/M2 | TEMPERATURE: 98.2 F | RESPIRATION RATE: 18 BRPM | DIASTOLIC BLOOD PRESSURE: 68 MMHG | OXYGEN SATURATION: 97 %

## 2017-08-02 DIAGNOSIS — C56.2 OVARIAN CANCER, LEFT (H): Primary | ICD-10-CM

## 2017-08-02 DIAGNOSIS — G47.00 INSOMNIA, UNSPECIFIED TYPE: ICD-10-CM

## 2017-08-02 DIAGNOSIS — R19.5 LOOSE STOOLS: ICD-10-CM

## 2017-08-02 DIAGNOSIS — L02.419 ABSCESS OF LEG: ICD-10-CM

## 2017-08-02 PROCEDURE — 99495 TRANSJ CARE MGMT MOD F2F 14D: CPT | Performed by: NURSE PRACTITIONER

## 2017-08-02 RX ORDER — ALBUTEROL SULFATE 90 UG/1
1-2 AEROSOL, METERED RESPIRATORY (INHALATION)
Status: CANCELLED
Start: 2017-08-16

## 2017-08-02 RX ORDER — TRAZODONE HYDROCHLORIDE 50 MG/1
50-100 TABLET, FILM COATED ORAL
Qty: 90 TABLET | Refills: 1 | Status: SHIPPED | OUTPATIENT
Start: 2017-08-02 | End: 2017-08-02

## 2017-08-02 RX ORDER — DIPHENHYDRAMINE HYDROCHLORIDE 50 MG/ML
50 INJECTION INTRAMUSCULAR; INTRAVENOUS
Status: CANCELLED
Start: 2017-08-16

## 2017-08-02 RX ORDER — EPINEPHRINE 0.3 MG/.3ML
0.3 INJECTION SUBCUTANEOUS EVERY 5 MIN PRN
Status: CANCELLED | OUTPATIENT
Start: 2017-08-16

## 2017-08-02 RX ORDER — PALONOSETRON 0.05 MG/ML
0.25 INJECTION, SOLUTION INTRAVENOUS ONCE
Status: CANCELLED
Start: 2017-08-16

## 2017-08-02 RX ORDER — SODIUM CHLORIDE 9 MG/ML
1000 INJECTION, SOLUTION INTRAVENOUS CONTINUOUS PRN
Status: CANCELLED
Start: 2017-08-16

## 2017-08-02 RX ORDER — ALBUTEROL SULFATE 0.83 MG/ML
2.5 SOLUTION RESPIRATORY (INHALATION)
Status: CANCELLED | OUTPATIENT
Start: 2017-08-16

## 2017-08-02 RX ORDER — ATORVASTATIN CALCIUM 80 MG/1
80 TABLET, FILM COATED ORAL DAILY
Qty: 90 TABLET | Status: CANCELLED | OUTPATIENT
Start: 2017-08-02

## 2017-08-02 RX ORDER — DIPHENHYDRAMINE HCL 25 MG
50 CAPSULE ORAL ONCE
Status: CANCELLED
Start: 2017-08-16

## 2017-08-02 RX ORDER — MEPERIDINE HYDROCHLORIDE 25 MG/ML
25 INJECTION INTRAMUSCULAR; INTRAVENOUS; SUBCUTANEOUS EVERY 30 MIN PRN
Status: CANCELLED | OUTPATIENT
Start: 2017-08-16

## 2017-08-02 RX ORDER — EPINEPHRINE 1 MG/ML
0.3 INJECTION INTRAMUSCULAR; INTRAVENOUS; SUBCUTANEOUS EVERY 5 MIN PRN
Status: CANCELLED | OUTPATIENT
Start: 2017-08-16

## 2017-08-02 RX ORDER — LORAZEPAM 2 MG/ML
1 INJECTION INTRAMUSCULAR EVERY 6 HOURS PRN
Status: CANCELLED
Start: 2017-08-16

## 2017-08-02 RX ORDER — METHYLPREDNISOLONE SODIUM SUCCINATE 125 MG/2ML
125 INJECTION, POWDER, LYOPHILIZED, FOR SOLUTION INTRAMUSCULAR; INTRAVENOUS
Status: CANCELLED
Start: 2017-08-16

## 2017-08-02 NOTE — NURSING NOTE
"Chief Complaint   Patient presents with     RECHECK     TCU Follow Up        Initial /68  Pulse 91  Temp 98.2  F (36.8  C) (Oral)  Resp 18  Ht 5' 3\" (1.6 m)  Wt 185 lb (83.9 kg)  SpO2 97%  BMI 32.77 kg/m2 Estimated body mass index is 32.77 kg/(m^2) as calculated from the following:    Height as of this encounter: 5' 3\" (1.6 m).    Weight as of this encounter: 185 lb (83.9 kg).  Medication Reconciliation: complete     Shannon Tello MA      "

## 2017-08-02 NOTE — PROGRESS NOTES
SUBJECTIVE:                                                    Hafsa Corona is a 62 year old female who presents to clinic today for the following health issues:      TCU Followup:    Facility: [facility]     Re-pack abscess dressing on left leg  Pt would like to stop home care.   Sleep Issues: taking [employer]   Loose stools intermittently since out of hospital   Weight loss  Ovarian cancer      The patient has had a recent episode of lower abdominal pain in early July.  She was subsequently sent to the emergency room and was found to have a large 9 cm complex ovarian mass.  She was admitted to the hospital for pain control and underwent an exploratory laparotomy, bilateral salpingo-oophorectomy, omentectomy, pelvic and periaortic lymphadenectomy, as well as hysterectomy by Dr. Cole.  She has recovered well.  Procedure was about 3 weeks ago.  She now does not have any nausea, vomiting, fever or chills.         She has been having some loose stools intermittently.  No fevers.  Pain is improved and manageable.  She is planning on getting a second opinion from [facility] regarding chemotherapy options prior to starting at the .  She is having some difficulty emotionally with a cancer diagnosis.   She is having some difficulty sleeping.       Hospital Follow-up Visit:    Hospital/Nursing Home/IP Rehab Facility: [facility]   Date of Admission: 7/12  Date of Discharge: 7/28  Reason(s) for Admission:s/p abdominal surgery for ovarian cancer; right leg abscess            Problems taking medications regularly:  None       Medication changes since discharge: None       Problems adhering to non-medication therapy:  None    Summary of hospitalization:  Discharge summary unavailable  Diagnostic Tests/Treatments reviewed.  Follow up needed: home care did not come - needs dressing change  Other Healthcare Providers Involved in Patient s Care:         oncology  Update since discharge: improved.     Post Discharge Medication  Reconciliation: discharge medications reconciled, continue medications without change.  Plan of care communicated with patient     Coding guidelines for this visit:  Type of Medical   Decision Making Face-to-Face Visit       within 7 Days of discharge Face-to-Face Visit        within 14 days of discharge   Moderate Complexity [id number]   High Complexity [id number]              Problem list and histories reviewed & adjusted, as indicated.  Additional history: as documented    Patient Active Problem List   Diagnosis     Attention deficit disorder     PANIC DISORDER      VASOMOTOR RHINITIS     Chronic Maxillary Sinusitis     Tachycardia     Type 2 diabetes mellitus without complication (H)     HYPERLIPIDEMIA LDL GOAL <100     Allergic rhinitis due to other allergen     BMI > 35     Essential hypertension     Lumbago     Major depressive disorder, recurrent episode, mild (H)     Long-term (current) use of anticoagulants [Z79.01]     Deep vein thrombosis (DVT) (H) [I82.409]     Pelvic mass in female     S/P laparotomy     Ovarian cancer, left (H)     Encounter for long-term (current) use of medications     Past Surgical History:   Procedure Laterality Date     HYSTERECTOMY TOTAL ABDOMINAL, BILATERAL SALPINGO-OOPHORECTOMY, NODE DISSECTION, COMBINED Left 7/7/2017    Procedure: COMBINED HYSTERECTOMY TOTAL ABDOMINAL, SALPINGO-OOPHORECTOMY, NODE DISSECTION;  Exploratory Laparotomy, Left Salpingo-Oophorectomy, Cancer Staging, Total Hysterectomy, Omentectomy, Evacuation of abdominal fluid, Lymph Node Dissection  Anesthesia Block ;  Surgeon: Serena Cole MD;  Location: [facility] OR     HYSTERECTOMY, PAP NO LONGER INDICATED  07/07/2017    Laparotomy; for ovarian cancer staging     LYMPHADENECTOMY RETROPERITONEAL Bilateral 07/07/2017    Laparotomy; pelvics & para-aortics; for ovarian cancer staging     OMENTECTOMY  07/07/2017    Laparotomy; for ovarian cancer staging     SALPINGO OOPHORECTOMY,R/L/KAYY Right 2008    Salpingo  Oophorectomy, RT     SALPINGO OOPHORECTOMY,R/L/KAYY Left 2017    Laparotomy; for ovarian cancer staging     SURGICAL HISTORY OF -       facial surgery d/t fall in 2002     SURGICAL HISTORY OF -        removal of breast cyst     SURGICAL HISTORY OF -       endometrial ablation       Social History   Substance Use Topics     Smoking status: Never Smoker     Smokeless tobacco: Never Used     Alcohol use Yes      Comment: rarely     Family History   Problem Relation Age of Onset     C.A.D. Father      DIABETES Father      Hypertension Father      CEREBROVASCULAR DISEASE Father      Psychotic Disorder Father      Pancreatic Cancer Father      C.A.D. Mother      Hypertension Mother      Breast Cancer Mother      Psychotic Disorder Mother      Colon Cancer Mother      CANCER Mother      Bone cancer     Prostate Problems Brother      cleared      Psychotic Disorder Sister      x2     Neurologic Disorder Sister      Psychotic Disorder Brother      x2     Depression Brother      major depressive disorder and OCD     Anxiety Disorder Brother      MENTAL ILLNESS Brother      Psychotic Disorder Maternal Grandmother      ?     Psychotic Disorder Maternal Grandfather      ?     Psychotic Disorder Paternal Grandmother      Schizophernia     Psychotic Disorder Paternal Grandfather      ?     Respiratory Paternal Grandfather       of emphysema and smoking     Psychotic Disorder Sister      Other - See Comments Sister      small kidney stone      Cancer - colorectal No family hx of              Reviewed and updated as needed this visit by clinical staff       Reviewed and updated as needed this visit by Provider         ROS:  C: NEGATIVE for fever, chills, change in weight  INTEGUMENTARY/SKIN: see HPI  E/M: NEGATIVE for ear, mouth and throat problems  R: NEGATIVE for significant cough or SOB  CV: NEGATIVE for chest pain, palpitations or peripheral edema  GI: NEGATIVE for nausea, abdominal pain, heartburn; see  "HPI  MUSCULOSKELETAL: NEGATIVE for significant arthralgias or myalgia  NEURO: NEGATIVE for weakness, dizziness or paresthesias  PSYCHIATRIC: see HPI    OBJECTIVE:     /68  Pulse 91  Temp 98.2  F (36.8  C) (Oral)  Resp 18  Ht 5' 3\" (1.6 m)  Wt 185 lb (83.9 kg)  SpO2 97%  BMI 32.77 kg/m2  Body mass index is 32.77 kg/(m^2).  GENERAL: healthy, alert and no distress  RESP: lungs clear to auscultation - no rales, rhonchi or wheezes  CV: regular rate and rhythm, normal S1 S2, no S3 or S4, no murmur, click or rub, no peripheral edema and peripheral pulses strong  ABDOMEN: soft, nontender, no hepatosplenomegaly, no masses and bowel sounds normal  SKIN: anterior left upper leg with 3 cm area of induration, packing in place, serosanguinous drainage noted  NEURO: Normal strength and tone, mentation intact and speech normal  PSYCH: mentation appears normal, affect teary        ASSESSMENT/PLAN:             1. Ovarian cancer, left (H)  She will follow up with oncology.  Referral to [facility] Reyes given.   - MENTAL HEALTH REFERRAL    2. Insomnia, unspecified type  Trazodone 50 mg one tablet at HS.  Discussed the use and indication of this medication as well as potential side effects.     3. Loose stools  Will check stool tests.  Discussed dietary changes.   Follow up if symptoms persist or worsen.  - Enteric Bacteria and Virus Panel by THIERNO Stool; Future  - Ova and Parasite Exam Routine; Future    4. Abscess of leg  Packing changed and new dressing applied.  Supplies given for home dressing changes.  - order for DME; Plain packing strip 1/4\"  Dispense: 1 Bottle; Refill: PRN  - order for DME; Sterile zoey tipped applicators  Dispense: 1 Box; Refill: PRN        Morelia Ayon NP  [facility]  "

## 2017-08-02 NOTE — MR AVS SNAPSHOT
After Visit Summary   8/2/2017    Hafsa Corona    MRN: [id number]           Patient Information     Date Of Birth          1955        Visit Information        Provider Department      8/2/2017 2:15 PM Morelia Ayon NP [facility]        Today's Diagnoses     Ovarian cancer, left (H)    -  1    Insomnia, unspecified type        Loose stools        Abscess of leg           Follow-ups after your visit        Additional Services     MENTAL HEALTH REFERRAL       Your provider has referred you to: [facility]: [facility] - Counseling (Individual/Couples/Family) - Lucina Reyes    All scheduling is subject to the client's specific insurance plan & benefits, provider/location availability, and provider clinical specialities.  Please arrive 15 minutes early for your first appointment and bring your completed paperwork.    Please be aware that coverage of these services is subject to the terms and limitations of your health insurance plan.  Call member services at your health plan with any benefit or coverage questions.                  Your next 10 appointments already scheduled     Aug 11, 2017 12:45 PM CDT   LAB with [facility] ([facility])    [address] [place] [address] [phone]           Patient must bring picture ID. Patient should be prepared to give a urine specimen  Please do not eat 10-12 hours before your appointment if you are coming in fasting for labs on lipids, cholesterol, or glucose (sugar). Pregnant women should follow their Care Team instructions. Water with medications is okay. Do not drink coffee or other fluids. If you have concerns about taking  your medications, please ask at office or if scheduling via [employer], send a message by clicking on Secure Messaging, Message Your Care Team.            Aug 11, 2017  1:00 PM CDT   US LOWER EXTREMITY VENOUS DUPLEX LEFT with UCUS   M  [facility] US ([facility])    [address] [facility] [address]  1st [facility] [address] [phone]           Please bring a list of your medicines (including vitamins, minerals and over-the-counter drugs). Also, tell your doctor about any allergies you may have. Wear comfortable clothes and leave your valuables at home.  You do not need to do anything special to prepare for your exam.  Please call the Imaging Department at your exam site with any questions.            Aug 11, 2017  1:30 PM CDT   US LOWER EXTREMITY VENOUS DUPLEX RIGHT with UCUSV1   [facility] US ([facility])    [address] [id number] [phone]           Please bring a list of your medicines (including vitamins, minerals and over-the-counter drugs). Also, tell your doctor about any allergies you may have. Wear comfortable clothes and leave your valuables at home.  You do not need to do anything special to prepare for your exam.  Please call the Imaging Department at your exam site with any questions.            Aug 11, 2017  2:00 PM CDT   (Arrive by 1:45 PM)   RETURN CLOTTING DISORDER with William Villarreal MD   [facility] Bleeding [facility] Clotting ([facility])    [address] [place] [address]  3rd [facility] [address]   [phone]            Aug 16, 2017  7:30 AM CDT   [facility] Draw with [facility] LAB DRAW   [facility] Lab Draw ([facility])    [address] [place] [address]  2nd [facility] [address]   [phone]            Aug 16, 2017  8:00 AM CDT   (Arrive by 7:45 AM)   Return Active Treatment with MARLON Limon CNP   [facility] ([facility])    [address] [place] [address]  2nd [facility] [id number] [phone]            Aug 16, 2017 11:00 AM CDT   Infusion 360 with  [address] [facility] Advanced Treatment  "Oncology Infusion ([facility])    [address] [facility]  2nd Floor  [address] [phone]            Sep 01, 2017  2:15 PM CDT   (Arrive by 2:00 PM)   NEW WITH ROOM with Mariam Sams GC,  2 114 CONSULT [facility] ([facility])    [address] [facility]  2nd [facility] [address] [phone]            Sep 21, 2017 11:00 AM CDT   New Visit with Lucina Reyes [facility] ([facility])    [address] [place] [address] [phone]              Who to contact     If you have questions or need follow up information about today's clinic visit or your schedule please contact [facility] directly at [phone].  Normal or non-critical lab and imaging results will be communicated to you by MyChart, letter or phone within 4 business days after the clinic has received the results. If you do not hear from us within 7 days, please contact the clinic through [facility] or phone. If you have a critical or abnormal lab result, we will notify you by phone as soon as possible.  Submit refill requests through X-IO or call your pharmacy and they will forward the refill request to us. Please allow 3 business days for your refill to be completed.          Additional Information About Your Visit        X-IO Information     X-IO lets you send messages to your doctor, view your test results, renew your prescriptions, schedule appointments and more. To sign up, go to [email or web address] . Click on \"Log in\" on the left side of the screen, which will take you to the Welcome page. Then click on \"Sign up Now\" on the right side of the page.     You will be asked to enter the access code listed below, as well as some personal information. Please follow the directions to create your username and password.     Your access code is: [email or web address]  Expires: 10/10/2017 11:16 " "AM     Your access code will  in 90 days. If you need help or a new code, please call your [place] clinic or [phone].        Care EveryWhere ID     This is your Care EveryWhere ID. This could be used by other organizations to access your [facility] medical records  [id number]        Your Vitals Were     Pulse Temperature Respirations Height Pulse Oximetry BMI (Body Mass Index)    91 98.2  F (36.8  C) (Oral) 18 5' 3\" (1.6 m) 97% 32.77 kg/m2       Blood Pressure from Last 3 Encounters:   17 101/68   17 124/77   17 116/68    Weight from Last 3 Encounters:   17 185 lb (83.9 kg)   17 191 lb 6.4 oz (86.8 kg)   17 223 lb 11.2 oz (101.5 kg)              We Performed the Following     MENTAL HEALTH REFERRAL          Today's Medication Changes          These changes are accurate as of: 17 11:59 PM.  If you have any questions, ask your nurse or doctor.               Start taking these medicines.        Dose/Directions    traZODone 50 MG tablet   Commonly known as:  DESYREL   Used for:  Insomnia, unspecified type   Started by:  Morelia Ayon, NP        TAKE 1 TO 2 TABLETS(50  MG) BY MOUTH EVERY NIGHT AS NEEDED FOR SLEEP   Quantity:  180 tablet   Refills:  1         These medicines have changed or have updated prescriptions.        Dose/Directions    metFORMIN 500 MG 24 hr tablet   Commonly known as:  GLUCOPHAGE-XR   This may have changed:    - how much to take  - when to take this  - additional instructions   Used for:  Type 2 diabetes mellitus without complication, without long-term current use of insulin (H)        Take two tabs by mouth once daily with evening meal.   Quantity:  180 tablet   Refills:  PRN       * order for DME   This may have changed:  Another medication with the same name was added. Make sure you understand how and when to take each.   Used for:  Long-term (current) use of anticoagulants, Acute deep vein thrombosis (DVT) of left lower extremity, " "unspecified vein (H)        Compression stockings 20-30 mm Hg. To be wear when directed by Hematology team and while awake for the first two years post clot.   Quantity:  1 each   Refills:  0       * order for DME   This may have changed:  You were already taking a medication with the same name, and this prescription was added. Make sure you understand how and when to take each.   Used for:  Abscess of leg   Changed by:  Morelia Ayon NP        Plain packing strip 1/4\"   Quantity:  1 Bottle   Refills:  PRN       * order for DME   This may have changed:  You were already taking a medication with the same name, and this prescription was added. Make sure you understand how and when to take each.   Used for:  Abscess of leg   Changed by:  Morelia Ayon NP        Sterile zoey tipped applicators   Quantity:  1 Box   Refills:  PRN       * Notice:  This list has 3 medication(s) that are the same as other medications prescribed for you. Read the directions carefully, and ask your doctor or other care provider to review them with you.         Where to get your medicines      These medications were sent to [id number] - [place], [place] - [address] [facility] [address], [place] [address]    Hours:  24-hours Phone:  [phone]     traZODone 50 MG tablet         Some of these will need a paper prescription and others can be bought over the counter.  Ask your nurse if you have questions.     Bring a paper prescription for each of these medications     order for DME    order for DME                Primary Care Provider Office Phone # Fax #    Morelia Ayon [phone] [address] [facility] [address]        Equal Access to Services     [facility] AH: Hadii serena ku hadasho Soomaali, waaxda luqadaha, qaybta kaalmada adeegyada, sujatha friedman. So [facility] [phone].    ATENCIÓN: Si rad fish " martínez disposición servicios gratuitos de asistencia lingüística. Elizabeth krause [phone].    We comply with applicable federal civil rights laws and [place] laws. We do not discriminate on the basis of race, color, national origin, age, disability sex, sexual orientation or gender identity.            Thank you!     Thank you for choosing [facility]  for your care. Our goal is always to provide you with excellent care. Hearing back from our patients is one way we can continue to improve our services. Please take a few minutes to complete the written survey that you may receive in the mail after your visit with us. Thank you!             Your Updated Medication List - Protect others around you: Learn how to safely use, store and throw away your medicines at www.disposemymeds.org.          This list is accurate as of: 8/2/17 11:59 PM.  Always use your most recent med list.                   Brand Name Dispense Instructions for use Diagnosis    acetaminophen 325 MG tablet    TYLENOL    100 tablet    Take 2 tablets (650 mg) by mouth every 6 hours as needed for mild pain    Cellulitis and abscess of leg, Mass of pelvis       albuterol 108 (90 BASE) MCG/ACT Inhaler    albuterol    1 Inhaler    Inhale 2 puffs into the lungs 4 times daily as needed for shortness of breath / dyspnea    Cough       atorvastatin 80 MG tablet    LIPITOR    90 tablet    Take 1 tablet (80 mg) by mouth daily    Hyperlipidemia LDL goal <100       CALCIUM-MAGNESIUM-VITAMIN D PO      Take 2 tablets by mouth daily        DIGESTIVE ENZYME PO      Take 1 capsule by mouth as needed        EPINEPHrine 0.3 MG/0.3ML injection 2-pack    EPIPEN/ADRENACLICK/or ANY BX GENERIC EQUIV    1 each    Inject 0.3 mLs (0.3 mg) into the muscle once as needed for anaphylaxis    Bee allergy status       escitalopram 20 MG tablet    LEXAPRO    30 tablet    Take 1 tablet (20 mg) by mouth daily    Major depressive disorder, recurrent episode, mild (H)        "fluticasone 50 MCG/ACT spray    FLONASE    48 g    Spray 2 sprays into both nostrils daily    Chronic maxillary sinusitis       lisinopril 10 MG tablet    PRINIVIL/ZESTRIL    90 tablet    Take 1 tablet (10 mg) by mouth daily    Essential hypertension       metFORMIN 500 MG 24 hr tablet    GLUCOPHAGE-XR    180 tablet    Take two tabs by mouth once daily with evening meal.    Type 2 diabetes mellitus without complication, without long-term current use of insulin (H)       MULTIVITAMIN ADULT PO      Take 1 tablet by mouth daily        order for DME     1 Units    Equipment being ordered: blood pressure monitor    Essential hypertension       * order for DME     1 each    Compression stockings 20-30 mm Hg. To be wear when directed by Hematology team and while awake for the first two years post clot.    Long-term (current) use of anticoagulants, Acute deep vein thrombosis (DVT) of left lower extremity, unspecified vein (H)       * order for DME     1 Bottle    Plain packing strip 1/4\"    Abscess of leg       * order for DME     1 Box    Sterile zoey tipped applicators    Abscess of leg       polyethylene glycol Packet    MIRALAX/GLYCOLAX    7 packet    Take 17 g by mouth daily    Mass of pelvis       PRO-BIOTIC BLEND PO      Take 1 capsule by mouth daily Enzymatic Therapy-probiotic pearls        rivaroxaban ANTICOAGULANT 20 MG Tabs tablet    XARELTO    30 tablet    Take 1 tablet (20 mg) by mouth daily (with dinner)    Long-term (current) use of anticoagulants, Acute deep vein thrombosis (DVT) of left lower extremity, unspecified vein (H)       senna-docusate 8.6-50 MG per tablet    SENOKOT-S;PERICOLACE    100 tablet    Take 2 tablets by mouth 2 times daily Start with 1 tablet PO BID, If no bowel movement in 24 hours, increase to 2 tablets PO BID.  Hold for loose stools.    Mass of pelvis       traZODone 50 MG tablet    DESYREL    180 tablet    TAKE 1 TO 2 TABLETS(50  MG) BY MOUTH EVERY NIGHT AS NEEDED FOR SLEEP    " Insomnia, unspecified type       vitamin B complex with vitamin C Tabs tablet      Take 1 tablet by mouth daily        vitamin D 1000 UNITS capsule      Take 2,000 Units by mouth daily        VYVANSE PO      Take 50 mg by mouth daily        WAL-DRYL ALLERGY PO           * Notice:  This list has 3 medication(s) that are the same as other medications prescribed for you. Read the directions carefully, and ask your doctor or other care provider to review them with you.

## 2017-08-02 NOTE — TELEPHONE ENCOUNTER
Reason for Call:  Other FYI     Detailed comments: Coni who is a Nurse Care Manager from [employer] called to inform MANUEL that they have enrolled the pt in the Nurse Care Management. If any questions, she can be contacted below, thanks!    Phone Number Patient can be reached at: Other phone number:  [phone]    Best Time: anytime    Can we leave a detailed message on this number? YES    Call taken on 8/2/2017 at 9:19 AM by Francesca Page

## 2017-08-03 ENCOUNTER — CARE COORDINATION (OUTPATIENT)
Dept: ONCOLOGY | Facility: CLINIC | Age: 62
End: 2017-08-03

## 2017-08-03 DIAGNOSIS — R91.8 PULMONARY NODULES: Primary | ICD-10-CM

## 2017-08-03 NOTE — PROGRESS NOTES
Care Coordinator Note  Call from patient stating she is going for a second opinion and would like chemotherapy rescheduled to week of 8/14/17.    Shanon SMITH, RN  Care Coordinator  Gynecologic Cancer   Office:  [phone]  Pager: [phone] #[phone]

## 2017-08-04 NOTE — ADDENDUM NOTE
Encounter addended by: Yesenia Camacho, PT on: 8/4/2017  8:48 AM<BR>     Actions taken: Flowsheet accepted

## 2017-08-05 NOTE — PROGRESS NOTES
08/01/17 1500   Rehab Discipline   Discipline PT   Type of Visit   Type of visit Initial Edema Evaluation   General Information   Start of care 08/01/17   Referring physician Blayne Reyes PA-C   Orders Evaluate and treat as indicated   Order date 07/24/17   Medical diagnosis post thrombotic syndrome, DVT of distal vein of left lower extremity, unspecified chronicity.    Edema onset 07/07/17  (left ovarian mass, surgery with LND)   Affected body parts LLE;RLE   Edema etiology Cancer with lymph node dissection;Surgery;Infection;DVT   Location - Cancer with lymph node dissection left ovarian mass, pelvic LND   Edema etiology comments patient dx with DVT 6/5/17. dx with left ovarian mass surgery with LND performed 7/7/17, cellulitis infection 2/2 dog bite left anterior thigh 6/28/17. swelling bilateral LE developed following DVT and surgery for pelvic mass with LND left > right. received quick wrap at TCU.    Pertinent history of current problem (PT: include personal factors and/or comorbidities that impact the POC; OT: include additional occupational profile info) Moderate complexity: pain discomfort, difficulty with mobility, difficulty with clothes/shoes fitting. PMH includes: DVT left LE dx 6/5/17, developed cellulitis infection 2/2 dog bite 6/28/17 left anterior thigh, dx with left ovarian mass surgery performed 7/7/17 (hysterectomy left salpingo-oophorectomy, pelvic LND 0/13 on right 0/7 on left,para-aortic LND 0/5 on left 0/2 on right). other medical hx includes: T2DM, HTN, tachycardia, hyperlipidemia, lumbago.   Surgical / medical history reviewed Yes   Edema special tests Ultrasound  (DVT dx 6/5/17, left LE)   Prior level of functional mobility independent   Prior treatment (quick wrap)   Community support Family / friend caregiver   Patient role / employment history Employed  ()   Psychosocial concerns Depression;Impaired body image;Impaired coping;Impaired sleep   Living environment House  "/ [facility]   Fall Screening   Fall screen completed by PT   Per patient, fall 2 or more times in past year? No   Per patient, fall with injury in past year? No   Is patient a fall risk? No   System Outcome Measures   Outcome Measures Lymphedema   Lymphedema Life Impact Scale (score range 0-72). A higher score indicates greater impairment. 39   Subjective Report   Patient report of symptoms LLIS: score 39, 57% impairment. PHYSICAL CONCERNS: pain 3/4, limb heaviness 3/4, skin tightness 4/4, size comparison 3/4, affects movement 2/4, affects strength 2/4. PSYCHOSOCIAL CONCERNS: affects body image 2/4, affects socializing with others 1/4, affects intimate relations 3/4, lymphedema \"gets me down\" (depression, frustration, anger) 2/4, must rely on others for help due to lymphedema 2/4, i know what to do to manage my lymphedema 3/4. FUNCTIONAL CONCERNS: affects ability to perform self care activities 1/4, affects ability to perform routine home or work related activities 2/4, affects performance of preferred leisure activities 2/4, affects proper fit of clothing/shoes 3/4, affects sleep 1/4. INFECTION OCCURRENCE: in the past year ill with an infections 1x   Patient / Family Goals   Patient / family goals statement decrease swelling \"swelling goes away\"    Pain   Patient currently in pain Yes   Pain location LE, left > right   Pain rating 1/10   Pain description Discomfort   Pain description comment improved in the past week   Vital Signs   Vital signs BMI   BMI 32.77   Cognitive Status   Orientation Orientation to person, place and time   Level of consciousness Alert   Follows commands and answers questions 100% of the time   Personal safety and judgement Intact   Memory Intact   Edema Exam / Assessment   Skin condition Pitting;Hemosiderin deposits   Skin condition comments wound from dog bite left anterior thigh, covered with dressing, not observed. hyperpigmentation left anterior lower leg. no erythema or warmth noted.  "   Pitting 2+   Pitting location bilateral LE distal to knees and dorsal feet   Stemmer sign Positive   Volume LE   Right LE (mL) 9285   Left LE (mL) 99483   LE volume comparison LLE volume greater than RLE volume   % difference 13.8   Sensory   Sensory perception comments no c/o numbness/tingling   Planned Edema Interventions   Planned edema interventions Manual lymph drainage;Gradient compression bandaging;Fit for compression garment;Exercises;Precautions to prevent infection / exacerbation;Education;Wound care / dressing changes;Skin care / precautions;Home management program development   Clinical Impression   Criteria for skilled therapeutic intervention met Yes   Therapy diagnosis bilateral LE lymphedema   Influenced by the following impairments / conditions Stage 2;Phlebolymphedema;Wounds / Ulcers   Functional limitations due to impairments / conditions decreased mobility, discomfort/pain, sleep impaired, difficulty clothes/shoes fitting.    Clinical Presentation Evolving/Changing   Clinical Presentation Rationale LLIS, pain/discomfort, difficulty with clothes/shoes fitting, impaired sleep   Clinical Decision Making (Complexity) Moderate complexity   Treatment frequency (4x/week)   Treatment duration 3 weeks, then 1x/month for 2 months to assess home program.    Patient / family and/or staff in agreement with plan of care Yes   Risks and benefits of therapy have been explained Yes   Clinical impression comments patient with bilateral LE lymphedema following DVT dx june 2017, pelvic mass surgery with pelvic LND performed 7/7/17, cellulitis infection 2/2 dog bite 6/28/17 left anterior thigh. swelling started following dx DVT left > right. recommending treatment to decrease swelling and establish a home program to prevent increased problems with swelling.    Education Assessment   Preferred learning style Listening;Reading;Demonstration;Pictures / video   Barriers to learning No barriers   Goals   Edema Eval  Goals 1;2;3   Goal 1   Goal identifier HOME PROGRAM   Goal description patient will demonstrate understanding of techniques to independently manage her lymphedema at home. home program to include: gradient compression bandaging, compression garments, lymphatic exercises, self manual lymphatic drainage, and skin care.    Target date 10/29/17   Goal 2   Goal identifier VOLUMETRIC/CIRCUMFERENTIAL MEASUREMENTS   Goal description volumetric/circumferential measurements will be reduced 5-10% to enable the patient to wear normal clothes/shoes, increase tolerance to activities, and prevent infections.    Target date 10/29/17   Goal 3   Goal identifier LLIS   Goal description LLIS score will show 8+ improvement in physical concerns, psychosocial concerns, and functional concerns.    Target date 10/29/17   Total Evaluation Time   Total evaluation time 20

## 2017-08-05 NOTE — ADDENDUM NOTE
Encounter addended by: Yesenia Camacho, PT on: 8/5/2017  9:20 AM<BR>     Actions taken: Sign clinical note, Flowsheet accepted

## 2017-08-08 DIAGNOSIS — R19.5 LOOSE STOOLS: ICD-10-CM

## 2017-08-08 PROCEDURE — 87506 IADNA-DNA/RNA PROBE TQ 6-11: CPT | Performed by: NURSE PRACTITIONER

## 2017-08-08 PROCEDURE — 87177 OVA AND PARASITES SMEARS: CPT | Performed by: NURSE PRACTITIONER

## 2017-08-08 PROCEDURE — 87209 SMEAR COMPLEX STAIN: CPT | Performed by: NURSE PRACTITIONER

## 2017-08-09 ENCOUNTER — TRANSFERRED RECORDS (OUTPATIENT)
Dept: HEALTH INFORMATION MANAGEMENT | Facility: CLINIC | Age: 62
End: 2017-08-09

## 2017-08-09 LAB
MICRO REPORT STATUS: NORMAL
O+P STL MICRO: NORMAL
SPECIMEN SOURCE: NORMAL

## 2017-08-10 ENCOUNTER — TRANSFERRED RECORDS (OUTPATIENT)
Dept: HEALTH INFORMATION MANAGEMENT | Facility: CLINIC | Age: 62
End: 2017-08-10

## 2017-08-11 ENCOUNTER — OFFICE VISIT (OUTPATIENT)
Dept: HEMATOLOGY | Facility: CLINIC | Age: 62
End: 2017-08-11
Attending: INTERNAL MEDICINE
Payer: COMMERCIAL

## 2017-08-11 VITALS
SYSTOLIC BLOOD PRESSURE: 124 MMHG | BODY MASS INDEX: 32.16 KG/M2 | TEMPERATURE: 98.5 F | HEART RATE: 83 BPM | HEIGHT: 63 IN | OXYGEN SATURATION: 95 % | WEIGHT: 181.5 LBS | DIASTOLIC BLOOD PRESSURE: 84 MMHG

## 2017-08-11 DIAGNOSIS — Z79.899 ENCOUNTER FOR LONG-TERM (CURRENT) USE OF MEDICATIONS: ICD-10-CM

## 2017-08-11 DIAGNOSIS — R19.00 MASS OF PELVIS: ICD-10-CM

## 2017-08-11 DIAGNOSIS — I82.402 ACUTE DEEP VEIN THROMBOSIS (DVT) OF LEFT LOWER EXTREMITY, UNSPECIFIED VEIN (H): ICD-10-CM

## 2017-08-11 DIAGNOSIS — C56.2 OVARIAN CANCER, LEFT (H): ICD-10-CM

## 2017-08-11 DIAGNOSIS — Z79.01 LONG-TERM (CURRENT) USE OF ANTICOAGULANTS: ICD-10-CM

## 2017-08-11 LAB
ABO + RH BLD: NORMAL
ABO + RH BLD: NORMAL
BLD GP AB SCN SERPL QL: NORMAL
BLOOD BANK CMNT PATIENT-IMP: NORMAL
SPECIMEN EXP DATE BLD: NORMAL

## 2017-08-11 PROCEDURE — 99215 OFFICE O/P EST HI 40 MIN: CPT | Performed by: INTERNAL MEDICINE

## 2017-08-11 PROCEDURE — 99212 OFFICE O/P EST SF 10 MIN: CPT

## 2017-08-11 ASSESSMENT — PAIN SCALES - GENERAL: PAINLEVEL: NO PAIN (0)

## 2017-08-11 NOTE — LETTER
2017      RE: Hafsa Corona  [address]  [place] [address]           [facility]  [address] [place] [address], [facility] [address], [place] [address]  Phone: [phone], Fax: [phone].      Outpatient Visit Note:    Patient: Hafsa Corona  MRN: [id number]  : 1955  CLIFF: Aug 11, 2017      Reason for Visit:  Follow up for distal DVT    Forward History:  Presented 2017 with LLE pain and swelling, found to have a distal DVT  2017 switched from warfarin to rivaroxaban, plan for 3 months of therapy and consider duration of therapy    Interval History: Hafsa Corona is a 62 year old woman with a history of distal uprovoked DVT who returns for routine follow up.  In the interim, Hafsa was found to a new diagnosis of clear cell ovarian cancer after she presented to the emergency department with abdominal pain was found to have a large ovarian mass.  She underwent an extploratory laparotomy BSO and omentectomy, and lymph node excision.  She was found to have stage IC2 clear cell carcinoma as she had a ruptured mass but no evidence of local or distant metastases.    She reports she is doing well since I saw her last but is greatly concerned about her prognosis and risk for recurrence of cancer.  She is planning for adjuvant chemotherapy starting very soon.  She has many questions about anticoagulation duration, risks associated with chemotherapy and anticoagulation, and whether she should change her anticoagulant.    Past Medical History:  Past Medical History:   Diagnosis Date     Anxiety state, unspecified     [id number]     Attention deficit disorder without mention of hyperactivity      Chronic rhinitis      Depressive disorder, not elsewhere classified      Panic disorder without agoraphobia      Pure hypercholesterolemia     Lipitor     Tachycardia, unspecified     1999-2004     Type II or unspecified type diabetes mellitus without mention of  "complication, not stated as uncontrolled     diagnosed 2004       Medications:  Current Outpatient Prescriptions   Medication Sig Dispense Refill     rivaroxaban ANTICOAGULANT (XARELTO) 20 MG TABS tablet Take 1 tablet (20 mg) by mouth daily (with dinner) 30 tablet 11     traZODone (DESYREL) 50 MG tablet TAKE 1 TO 2 TABLETS(50  MG) BY MOUTH EVERY NIGHT AS NEEDED FOR SLEEP 180 tablet 1     order for DME Plain packing strip 1/4\" 1 Bottle PRN     order for DME Sterile zoey tipped applicators 1 Box PRN     Multiple Vitamins-Minerals (MULTIVITAMIN ADULT PO) Take 1 tablet by mouth daily       Lisdexamfetamine Dimesylate (VYVANSE PO) Take 50 mg by mouth daily       Probiotic Product (PRO-BIOTIC BLEND PO) Take 1 capsule by mouth daily Enzymatic Therapy-probiotic pearls       atorvastatin (LIPITOR) 80 MG tablet Take 1 tablet (80 mg) by mouth daily 90 tablet PRN     lisinopril (PRINIVIL/ZESTRIL) 10 MG tablet Take 1 tablet (10 mg) by mouth daily 90 tablet PRN     metFORMIN (GLUCOPHAGE-XR) 500 MG 24 hr tablet Take two tabs by mouth once daily with evening meal. (Patient taking differently: 1,000 mg daily (with dinner) Take two tabs by mouth once daily with evening meal.) 180 tablet PRN     escitalopram (LEXAPRO) 20 MG tablet Take 1 tablet (20 mg) by mouth daily 30 tablet PRN     fluticasone (FLONASE) 50 MCG/ACT nasal spray Spray 2 sprays into both nostrils daily 48 g 1     Cholecalciferol (VITAMIN D) 1000 UNIT capsule Take 2,000 Units by mouth daily        acetaminophen (TYLENOL) 325 MG tablet Take 2 tablets (650 mg) by mouth every 6 hours as needed for mild pain 100 tablet 0     polyethylene glycol (MIRALAX/GLYCOLAX) Packet Take 17 g by mouth daily (Patient not taking: Reported on 7/31/2017) 7 packet 0     senna-docusate (SENOKOT-S;PERICOLACE) 8.6-50 MG per tablet Take 2 tablets by mouth 2 times daily Start with 1 tablet PO BID, If no bowel movement in 24 hours, increase to 2 tablets PO BID.  Hold for loose stools. " (Patient not taking: Reported on 7/31/2017) 100 tablet 0     vitamin B complex with vitamin C (VITAMIN  B COMPLEX) TABS tablet Take 1 tablet by mouth daily       [DISCONTINUED] rivaroxaban ANTICOAGULANT (XARELTO) 20 MG TABS tablet Take 1 tablet (20 mg) by mouth daily (with dinner) 30 tablet 3     order for DME Compression stockings 20-30 mm Hg. To be wear when directed by Hematology team and while awake for the first two years post clot. (Patient not taking: Reported on 7/31/2017) 1 each 0     CALCIUM-MAGNESIUM-VITAMIN D PO Take 2 tablets by mouth daily       order for DME Equipment being ordered: blood pressure monitor (Patient not taking: Reported on 8/2/2017) 1 Units 0     EPINEPHrine (EPIPEN) 0.3 MG/0.3ML injection Inject 0.3 mLs (0.3 mg) into the muscle once as needed for anaphylaxis (Patient not taking: Reported on 7/31/2017) 1 each PRN     albuterol (ALBUTEROL) 108 (90 BASE) MCG/ACT inhaler Inhale 2 puffs into the lungs 4 times daily as needed for shortness of breath / dyspnea (Patient not taking: Reported on 7/31/2017) 1 Inhaler PRN        Allergies:  Allergies   Allergen Reactions     Sulfa Drugs      Wasps [Hornets]        ROS:  A 14 point ROS is negative except as stated in the HPI    Objective:  Vitals: B/P: 124/84, T: 98.5, P: 83, R: Data Unavailable, Wt: 181 lbs 8 oz  Exam:   Gen: Appears well, no distress  HEENT: no scleral icterus or hemorrhage, no wet purpura, no lymphadenopathy  Ext: no edema, she has some persistent anterior tibial hemosiderosis that is unchanged but no erythema    Labs:  Results for HAFSA BRISENO (MRN [id number]) as of 8/15/2017 15:10   Ref. Range 7/11/2017 04:26   Creatinine Latest Ref Range: 0.52 - 1.04 mg/dL 1.00   GFR Estimate Latest Ref Range: >60 mL/min/1.7m2 56 (L)       Imaging:  Ultrasound from today shows complete resolution of her distal DVT and no new DVTs    Assessment:  In summary, Hafsa Briseno is a 62 year old woman with unprovoked distal DVT, now likely the DVT  is cancer-associated.  This changes her risk for recurrence from our previous meeting which we reviewed in depth.  I would consider active use of chemotherapy a strong risk for recurrence (even after she is treated for this thrombosis).  Perhaps a smaller risk factor would be for cancer-associated recurrent thrombosis given that we do not yet know if she is going to be free of cancer moving forward.  However she has tolerated therapy well thus far so we will plan to continue therapy indefinitely and return to this risk assessment in 1 year.    Plan:  1. Majority of today's visit was spent counseling the patient regarding cancer its association with risk for VTE.  2. She will continue rivaroxaban 5 mg BID as treatment for her distal DVT until September 5.  3. She will then continue rivaroxaban 5mg BID as secondary prophylaxis for cancer-associated VTE (and increased risk due to chemotherapy) indefinitely.  HOLD rivaroxaban when platelets are < 50 due to increased risk of bleeding.  This may require more intensive monitoring of her platelet count while on adjuvant chemotherapy.  4. She will return in one year to discuss whether to continue treatment.  This decision is primarily related to her risk of bleeding versus risk of cancer recurrence as her main risk factor for VTE recurrence.    The patient is given our center's contact information and is instructed to call if she should have any further questions or concerns.  Otherwise, we will plan on seeing her back in 1 year.      Total Time Spent:  I spent a total of 40 minutes face-to-face with Hafsa Corona during today's office visit.  Over 50% of this time was spent counseling the patient and/or coordinating care regarding cancer-associated VTE.      William Villarreal MD   of Medicine  [facility] [employer]       William Villarreal MD

## 2017-08-11 NOTE — NURSING NOTE
"Chief Complaint   Patient presents with     RECHECK     Follow up clotter.       Initial /84  Pulse 83  Temp 98.5  F (36.9  C) (Oral)  Ht 1.6 m (5' 3\")  Wt 82.3 kg (181 lb 8 oz)  SpO2 95%  BMI 32.15 kg/m2 Estimated body mass index is 32.15 kg/(m^2) as calculated from the following:    Height as of this encounter: 1.6 m (5' 3\").    Weight as of this encounter: 82.3 kg (181 lb 8 oz).  Medication Reconciliation: complete   Danielle Durham., CMA    "

## 2017-08-11 NOTE — LETTER
2017       RE: Hafsa Corona  [address]  [place] [address]     Dear Colleague,    Thank you for referring your patient, Hafsa Corona, to the [facility]. Please see a copy of my visit note below.      [facility]  [address] [facility], [facility] [address], [address], [place] [address]  Phone: [phone], Fax: [phone].      Outpatient Visit Note:    Patient: Hafsa Corona  MRN: [id number]  : 1955  CLIFF: Aug 11, 2017      Reason for Visit:  Follow up for distal DVT    Forward History:  Presented 2017 with LLE pain and swelling, found to have a distal DVT  2017 switched from warfarin to rivaroxaban, plan for 3 months of therapy and consider duration of therapy    Interval History: Hafsa Corona is a 62 year old woman with a history of distal uprovoked DVT who returns for routine follow up.  In the interim, Hafsa was found to a new diagnosis of clear cell ovarian cancer after she presented to the emergency department with abdominal pain was found to have a large ovarian mass.  She underwent an extploratory laparotomy BSO and omentectomy, and lymph node excision.  She was found to have stage IC2 clear cell carcinoma as she had a ruptured mass but no evidence of local or distant metastases.    She reports she is doing well since I saw her last but is greatly concerned about her prognosis and risk for recurrence of cancer.  She is planning for adjuvant chemotherapy starting very soon.  She has many questions about anticoagulation duration, risks associated with chemotherapy and anticoagulation, and whether she should change her anticoagulant.    Past Medical History:  Past Medical History:   Diagnosis Date     Anxiety state, unspecified     [id number]     Attention deficit disorder without mention of hyperactivity      Chronic rhinitis      Depressive disorder, not elsewhere classified       "Panic disorder without agoraphobia      Pure hypercholesterolemia     Lipitor     Tachycardia, unspecified     9/1999-2/2004     Type II or unspecified type diabetes mellitus without mention of complication, not stated as uncontrolled     diagnosed 2004       Medications:  Current Outpatient Prescriptions   Medication Sig Dispense Refill     rivaroxaban ANTICOAGULANT (XARELTO) 20 MG TABS tablet Take 1 tablet (20 mg) by mouth daily (with dinner) 30 tablet 11     traZODone (DESYREL) 50 MG tablet TAKE 1 TO 2 TABLETS(50  MG) BY MOUTH EVERY NIGHT AS NEEDED FOR SLEEP 180 tablet 1     order for DME Plain packing strip 1/4\" 1 Bottle PRN     order for DME Sterile zoey tipped applicators 1 Box PRN     Multiple Vitamins-Minerals (MULTIVITAMIN ADULT PO) Take 1 tablet by mouth daily       Lisdexamfetamine Dimesylate (VYVANSE PO) Take 50 mg by mouth daily       Probiotic Product (PRO-BIOTIC BLEND PO) Take 1 capsule by mouth daily Enzymatic Therapy-probiotic pearls       atorvastatin (LIPITOR) 80 MG tablet Take 1 tablet (80 mg) by mouth daily 90 tablet PRN     lisinopril (PRINIVIL/ZESTRIL) 10 MG tablet Take 1 tablet (10 mg) by mouth daily 90 tablet PRN     metFORMIN (GLUCOPHAGE-XR) 500 MG 24 hr tablet Take two tabs by mouth once daily with evening meal. (Patient taking differently: 1,000 mg daily (with dinner) Take two tabs by mouth once daily with evening meal.) 180 tablet PRN     escitalopram (LEXAPRO) 20 MG tablet Take 1 tablet (20 mg) by mouth daily 30 tablet PRN     fluticasone (FLONASE) 50 MCG/ACT nasal spray Spray 2 sprays into both nostrils daily 48 g 1     Cholecalciferol (VITAMIN D) 1000 UNIT capsule Take 2,000 Units by mouth daily        acetaminophen (TYLENOL) 325 MG tablet Take 2 tablets (650 mg) by mouth every 6 hours as needed for mild pain 100 tablet 0     polyethylene glycol (MIRALAX/GLYCOLAX) Packet Take 17 g by mouth daily (Patient not taking: Reported on 7/31/2017) 7 packet 0     senna-docusate " (SENOKOT-S;PERICOLACE) 8.6-50 MG per tablet Take 2 tablets by mouth 2 times daily Start with 1 tablet PO BID, If no bowel movement in 24 hours, increase to 2 tablets PO BID.  Hold for loose stools. (Patient not taking: Reported on 7/31/2017) 100 tablet 0     vitamin B complex with vitamin C (VITAMIN  B COMPLEX) TABS tablet Take 1 tablet by mouth daily       [DISCONTINUED] rivaroxaban ANTICOAGULANT (XARELTO) 20 MG TABS tablet Take 1 tablet (20 mg) by mouth daily (with dinner) 30 tablet 3     order for DME Compression stockings 20-30 mm Hg. To be wear when directed by Hematology team and while awake for the first two years post clot. (Patient not taking: Reported on 7/31/2017) 1 each 0     CALCIUM-MAGNESIUM-VITAMIN D PO Take 2 tablets by mouth daily       order for DME Equipment being ordered: blood pressure monitor (Patient not taking: Reported on 8/2/2017) 1 Units 0     EPINEPHrine (EPIPEN) 0.3 MG/0.3ML injection Inject 0.3 mLs (0.3 mg) into the muscle once as needed for anaphylaxis (Patient not taking: Reported on 7/31/2017) 1 each PRN     albuterol (ALBUTEROL) 108 (90 BASE) MCG/ACT inhaler Inhale 2 puffs into the lungs 4 times daily as needed for shortness of breath / dyspnea (Patient not taking: Reported on 7/31/2017) 1 Inhaler PRN        Allergies:  Allergies   Allergen Reactions     Sulfa Drugs      Wasps [Hornets]        ROS:  A 14 point ROS is negative except as stated in the HPI    Objective:  Vitals: B/P: 124/84, T: 98.5, P: 83, R: Data Unavailable, Wt: 181 lbs 8 oz  Exam:   Gen: Appears well, no distress  HEENT: no scleral icterus or hemorrhage, no wet purpura, no lymphadenopathy  Ext: no edema, 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 Range 7/11/2017 04:26   Creatinine Latest Ref Range: 0.52 - 1.04 mg/dL 1.00   GFR Estimate Latest Ref Range: >60 mL/min/1.7m2 56 (L)        Imaging:  Ultrasound from today shows complete resolution of her distal DVT and no new DVTs    Assessment:  In summary, Hafsa Corona is a 62 year old woman with unprovoked distal DVT, now likely the DVT is cancer-associated.  This changes her risk for recurrence from our previous meeting which we reviewed in depth.  I would consider active use of chemotherapy a strong risk for recurrence (even after she is treated for this thrombosis).  Perhaps a smaller risk factor would be for cancer-associated recurrent thrombosis given that we do not yet know if she is going to be free of cancer moving forward.  However she has tolerated therapy well thus far so we will plan to continue therapy indefinitely and return to this risk assessment in 1 year.    Plan:  1. Majority of today's visit was spent counseling the patient regarding cancer its association with risk for VTE.  2. She will continue rivaroxaban 5 mg BID as treatment for her distal DVT until September 5.  3. She will then continue rivaroxaban 5mg BID as secondary prophylaxis for cancer-associated VTE (and increased risk due to chemotherapy) indefinitely.  HOLD rivaroxaban when platelets are < 50 due to increased risk of bleeding.  This may require more intensive monitoring of her platelet count while on adjuvant chemotherapy.  4. She will return in one year to discuss whether to continue treatment.  This decision is primarily related to her risk of bleeding versus risk of cancer recurrence as her main risk factor for VTE recurrence.    The patient is given our center's contact information and is instructed to call if she should have any further questions or concerns.  Otherwise, we will plan on seeing her back in 1 year.      Total Time Spent:  I spent a total of 40 minutes face-to-face with Hafsa Corona during today's office visit.  Over 50% of this time was spent counseling the patient and/or coordinating care regarding cancer-associated VTE.    William  MD Phil   of Medicine  [facility] [employer]

## 2017-08-11 NOTE — NURSING NOTE
" Hafsa Corona  MRN: [id number]  Female, 62 year old, 1955     Reason for Visit:  3 month return consult for DVT  Attended entire visit with Dr. Villarreal  Face To Face Time for Education (After provider visit):0    Saw Hafsa with Dr. Villarreal today.  We thought she had an unprovoked Left leg DVT 6/5/17, but was then found to have ovarian CA.  She has been on Xarelto for anticoagulation.  She did have surgery to remove her CA, but will be starting 6 weeks of chemo next week.  It was explained that Lovenox injections are \"gold standard\" for treating cancer clots, but she is doing quite well on Xarelto and is resistant to the idea of changing to injections. We have advised her to hold her Xarelto when her platelets >50 K during chemo treatment.  Her US of her leg showed that her clot had resolved.  She had many questions (see Dr. Villarreal's note for details).  We will see her back in 1 year to discuss anticoagulation.  She has our phone number if she has questions for us during the year.  Nevaeh Travis, RN, MSN -Nurse Clinician, [facility]  "

## 2017-08-11 NOTE — LETTER
2017      RE: Hafsa Corona  [address]  [place] [address]       [facility]  [address] [place] [address], [facility] [address], [place] [address]  Phone: [phone], Fax: [phone].      Outpatient Visit Note:    Patient: Hafsa Corona  MRN: [id number]  : 1955  CLIFF: Aug 11, 2017      Reason for Visit:  Follow up for distal DVT    Forward History:  Presented 2017 with LLE pain and swelling, found to have a distal DVT  2017 switched from warfarin to rivaroxaban, plan for 3 months of therapy and consider duration of therapy    Interval History: Hafsa Corona is a 62 year old woman with a history of distal uprovoked DVT who returns for routine follow up.  In the interim, Hafsa was found to a new diagnosis of clear cell ovarian cancer after she presented to the emergency department with abdominal pain was found to have a large ovarian mass.  She underwent an extploratory laparotomy BSO and omentectomy, and lymph node excision.  She was found to have stage IC2 clear cell carcinoma as she had a ruptured mass but no evidence of local or distant metastases.    She reports she is doing well since I saw her last but is greatly concerned about her prognosis and risk for recurrence of cancer.  She is planning for adjuvant chemotherapy starting very soon.  She has many questions about anticoagulation duration, risks associated with chemotherapy and anticoagulation, and whether she should change her anticoagulant.    Past Medical History:  Past Medical History:   Diagnosis Date     Anxiety state, unspecified     [id number]     Attention deficit disorder without mention of hyperactivity      Chronic rhinitis      Depressive disorder, not elsewhere classified      Panic disorder without agoraphobia      Pure hypercholesterolemia     Lipitor     Tachycardia, unspecified     1999-2004     Type II or unspecified type diabetes mellitus without mention of complication,  "not stated as uncontrolled     diagnosed 2004       Medications:  Current Outpatient Prescriptions   Medication Sig Dispense Refill     rivaroxaban ANTICOAGULANT (XARELTO) 20 MG TABS tablet Take 1 tablet (20 mg) by mouth daily (with dinner) 30 tablet 11     traZODone (DESYREL) 50 MG tablet TAKE 1 TO 2 TABLETS(50  MG) BY MOUTH EVERY NIGHT AS NEEDED FOR SLEEP 180 tablet 1     order for DME Plain packing strip 1/4\" 1 Bottle PRN     order for DME Sterile zoey tipped applicators 1 Box PRN     Multiple Vitamins-Minerals (MULTIVITAMIN ADULT PO) Take 1 tablet by mouth daily       Lisdexamfetamine Dimesylate (VYVANSE PO) Take 50 mg by mouth daily       Probiotic Product (PRO-BIOTIC BLEND PO) Take 1 capsule by mouth daily Enzymatic Therapy-probiotic pearls       atorvastatin (LIPITOR) 80 MG tablet Take 1 tablet (80 mg) by mouth daily 90 tablet PRN     lisinopril (PRINIVIL/ZESTRIL) 10 MG tablet Take 1 tablet (10 mg) by mouth daily 90 tablet PRN     metFORMIN (GLUCOPHAGE-XR) 500 MG 24 hr tablet Take two tabs by mouth once daily with evening meal. (Patient taking differently: 1,000 mg daily (with dinner) Take two tabs by mouth once daily with evening meal.) 180 tablet PRN     escitalopram (LEXAPRO) 20 MG tablet Take 1 tablet (20 mg) by mouth daily 30 tablet PRN     fluticasone (FLONASE) 50 MCG/ACT nasal spray Spray 2 sprays into both nostrils daily 48 g 1     Cholecalciferol (VITAMIN D) 1000 UNIT capsule Take 2,000 Units by mouth daily        acetaminophen (TYLENOL) 325 MG tablet Take 2 tablets (650 mg) by mouth every 6 hours as needed for mild pain 100 tablet 0     polyethylene glycol (MIRALAX/GLYCOLAX) Packet Take 17 g by mouth daily (Patient not taking: Reported on 7/31/2017) 7 packet 0     senna-docusate (SENOKOT-S;PERICOLACE) 8.6-50 MG per tablet Take 2 tablets by mouth 2 times daily Start with 1 tablet PO BID, If no bowel movement in 24 hours, increase to 2 tablets PO BID.  Hold for loose stools. (Patient not " taking: Reported on 7/31/2017) 100 tablet 0     vitamin B complex with vitamin C (VITAMIN  B COMPLEX) TABS tablet Take 1 tablet by mouth daily       [DISCONTINUED] rivaroxaban ANTICOAGULANT (XARELTO) 20 MG TABS tablet Take 1 tablet (20 mg) by mouth daily (with dinner) 30 tablet 3     order for DME Compression stockings 20-30 mm Hg. To be wear when directed by Hematology team and while awake for the first two years post clot. (Patient not taking: Reported on 7/31/2017) 1 each 0     CALCIUM-MAGNESIUM-VITAMIN D PO Take 2 tablets by mouth daily       order for DME Equipment being ordered: blood pressure monitor (Patient not taking: Reported on 8/2/2017) 1 Units 0     EPINEPHrine (EPIPEN) 0.3 MG/0.3ML injection Inject 0.3 mLs (0.3 mg) into the muscle once as needed for anaphylaxis (Patient not taking: Reported on 7/31/2017) 1 each PRN     albuterol (ALBUTEROL) 108 (90 BASE) MCG/ACT inhaler Inhale 2 puffs into the lungs 4 times daily as needed for shortness of breath / dyspnea (Patient not taking: Reported on 7/31/2017) 1 Inhaler PRN        Allergies:  Allergies   Allergen Reactions     Sulfa Drugs      Wasps [Hornets]        ROS:  A 14 point ROS is negative except as stated in the HPI    Objective:  Vitals: B/P: 124/84, T: 98.5, P: 83, R: Data Unavailable, Wt: 181 lbs 8 oz  Exam:   Gen: Appears well, no distress  HEENT: no scleral icterus or hemorrhage, no wet purpura, no lymphadenopathy  Ext: no edema, she has some persistent anterior tibial hemosiderosis that is unchanged but no erythema    Labs:  Results for HAFSA BRISENO (MRN [id number]) as of 8/15/2017 15:10   Ref. Range 7/11/2017 04:26   Creatinine Latest Ref Range: 0.52 - 1.04 mg/dL 1.00   GFR Estimate Latest Ref Range: >60 mL/min/1.7m2 56 (L)       Imaging:  Ultrasound from today shows complete resolution of her distal DVT and no new DVTs    Assessment:  In summary, Hafsa Briseno is a 62 year old woman with unprovoked distal DVT, now likely the DVT is  cancer-associated.  This changes her risk for recurrence from our previous meeting which we reviewed in depth.  I would consider active use of chemotherapy a strong risk for recurrence (even after she is treated for this thrombosis).  Perhaps a smaller risk factor would be for cancer-associated recurrent thrombosis given that we do not yet know if she is going to be free of cancer moving forward.  However she has tolerated therapy well thus far so we will plan to continue therapy indefinitely and return to this risk assessment in 1 year.    Plan:  1. Majority of today's visit was spent counseling the patient regarding cancer its association with risk for VTE.  2. She will continue rivaroxaban 5 mg BID as treatment for her distal DVT until September 5.  3. She will then continue rivaroxaban 5mg BID as secondary prophylaxis for cancer-associated VTE (and increased risk due to chemotherapy) indefinitely.  HOLD rivaroxaban when platelets are < 50 due to increased risk of bleeding.  This may require more intensive monitoring of her platelet count while on adjuvant chemotherapy.  4. She will return in one year to discuss whether to continue treatment.  This decision is primarily related to her risk of bleeding versus risk of cancer recurrence as her main risk factor for VTE recurrence.    The patient is given our center's contact information and is instructed to call if she should have any further questions or concerns.  Otherwise, we will plan on seeing her back in 1 year.      Total Time Spent:  I spent a total of 40 minutes face-to-face with Hafsa Corona during today's office visit.  Over 50% of this time was spent counseling the patient and/or coordinating care regarding cancer-associated VTE.      William Villarreal MD   of Medicine  [facility]

## 2017-08-11 NOTE — PROGRESS NOTES
[facility]  [address] [place] [address], [facility] [address], [place] [address]  Phone: [phone], Fax: [phone].      Outpatient Visit Note:    Patient: Hafsa Corona  MRN: [id number]  : 1955  CLIFF: Aug 11, 2017      Reason for Visit:  Follow up for distal DVT    Forward History:  Presented 2017 with LLE pain and swelling, found to have a distal DVT  2017 switched from warfarin to rivaroxaban, plan for 3 months of therapy and consider duration of therapy    Interval History: Hafsa Corona is a 62 year old woman with a history of distal uprovoked DVT who returns for routine follow up.  In the interim, Hafsa was found to a new diagnosis of clear cell ovarian cancer after she presented to the emergency department with abdominal pain was found to have a large ovarian mass.  She underwent an extploratory laparotomy BSO and omentectomy, and lymph node excision.  She was found to have stage IC2 clear cell carcinoma as she had a ruptured mass but no evidence of local or distant metastases.    She reports she is doing well since I saw her last but is greatly concerned about her prognosis and risk for recurrence of cancer.  She is planning for adjuvant chemotherapy starting very soon.  She has many questions about anticoagulation duration, risks associated with chemotherapy and anticoagulation, and whether she should change her anticoagulant.    Past Medical History:  Past Medical History:   Diagnosis Date     Anxiety state, unspecified     [id number]     Attention deficit disorder without mention of hyperactivity      Chronic rhinitis      Depressive disorder, not elsewhere classified      Panic disorder without agoraphobia      Pure hypercholesterolemia     Lipitor     Tachycardia, unspecified     1999-2004     Type II or unspecified type diabetes mellitus without mention of complication, not stated as uncontrolled     diagnosed        Medications:  Current  "Outpatient Prescriptions   Medication Sig Dispense Refill     rivaroxaban ANTICOAGULANT (XARELTO) 20 MG TABS tablet Take 1 tablet (20 mg) by mouth daily (with dinner) 30 tablet 11     traZODone (DESYREL) 50 MG tablet TAKE 1 TO 2 TABLETS(50  MG) BY MOUTH EVERY NIGHT AS NEEDED FOR SLEEP 180 tablet 1     order for DME Plain packing strip 1/4\" 1 Bottle PRN     order for DME Sterile zoey tipped applicators 1 Box PRN     Multiple Vitamins-Minerals (MULTIVITAMIN ADULT PO) Take 1 tablet by mouth daily       Lisdexamfetamine Dimesylate (VYVANSE PO) Take 50 mg by mouth daily       Probiotic Product (PRO-BIOTIC BLEND PO) Take 1 capsule by mouth daily Enzymatic Therapy-probiotic pearls       atorvastatin (LIPITOR) 80 MG tablet Take 1 tablet (80 mg) by mouth daily 90 tablet PRN     lisinopril (PRINIVIL/ZESTRIL) 10 MG tablet Take 1 tablet (10 mg) by mouth daily 90 tablet PRN     metFORMIN (GLUCOPHAGE-XR) 500 MG 24 hr tablet Take two tabs by mouth once daily with evening meal. (Patient taking differently: 1,000 mg daily (with dinner) Take two tabs by mouth once daily with evening meal.) 180 tablet PRN     escitalopram (LEXAPRO) 20 MG tablet Take 1 tablet (20 mg) by mouth daily 30 tablet PRN     fluticasone (FLONASE) 50 MCG/ACT nasal spray Spray 2 sprays into both nostrils daily 48 g 1     Cholecalciferol (VITAMIN D) 1000 UNIT capsule Take 2,000 Units by mouth daily        acetaminophen (TYLENOL) 325 MG tablet Take 2 tablets (650 mg) by mouth every 6 hours as needed for mild pain 100 tablet 0     polyethylene glycol (MIRALAX/GLYCOLAX) Packet Take 17 g by mouth daily (Patient not taking: Reported on 7/31/2017) 7 packet 0     senna-docusate (SENOKOT-S;PERICOLACE) 8.6-50 MG per tablet Take 2 tablets by mouth 2 times daily Start with 1 tablet PO BID, If no bowel movement in 24 hours, increase to 2 tablets PO BID.  Hold for loose stools. (Patient not taking: Reported on 7/31/2017) 100 tablet 0     vitamin B complex with vitamin C " (VITAMIN  B COMPLEX) TABS tablet Take 1 tablet by mouth daily       [DISCONTINUED] rivaroxaban ANTICOAGULANT (XARELTO) 20 MG TABS tablet Take 1 tablet (20 mg) by mouth daily (with dinner) 30 tablet 3     order for DME Compression stockings 20-30 mm Hg. To be wear when directed by Hematology team and while awake for the first two years post clot. (Patient not taking: Reported on 7/31/2017) 1 each 0     CALCIUM-MAGNESIUM-VITAMIN D PO Take 2 tablets by mouth daily       order for DME Equipment being ordered: blood pressure monitor (Patient not taking: Reported on 8/2/2017) 1 Units 0     EPINEPHrine (EPIPEN) 0.3 MG/0.3ML injection Inject 0.3 mLs (0.3 mg) into the muscle once as needed for anaphylaxis (Patient not taking: Reported on 7/31/2017) 1 each PRN     albuterol (ALBUTEROL) 108 (90 BASE) MCG/ACT inhaler Inhale 2 puffs into the lungs 4 times daily as needed for shortness of breath / dyspnea (Patient not taking: Reported on 7/31/2017) 1 Inhaler PRN        Allergies:  Allergies   Allergen Reactions     Sulfa Drugs      Wasps [Hornets]        ROS:  A 14 point ROS is negative except as stated in the HPI    Objective:  Vitals: B/P: 124/84, T: 98.5, P: 83, R: Data Unavailable, Wt: 181 lbs 8 oz  Exam:   Gen: Appears well, no distress  HEENT: no scleral icterus or hemorrhage, no wet purpura, no lymphadenopathy  Ext: no edema, 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 Range 7/11/2017 04:26   Creatinine Latest Ref Range: 0.52 - 1.04 mg/dL 1.00   GFR Estimate Latest Ref Range: >60 mL/min/1.7m2 56 (L)       Imaging:  Ultrasound from today shows complete resolution of her distal DVT and no new DVTs    Assessment:  In summary, Hafsa Briseno is a 62 year old woman with unprovoked distal DVT, now likely the DVT is cancer-associated.  This changes her risk for recurrence from our previous meeting which we  reviewed in depth.  I would consider active use of chemotherapy a strong risk for recurrence (even after she is treated for this thrombosis).  Perhaps a smaller risk factor would be for cancer-associated recurrent thrombosis given that we do not yet know if she is going to be free of cancer moving forward.  However she has tolerated therapy well thus far so we will plan to continue therapy indefinitely and return to this risk assessment in 1 year.    Plan:  1. Majority of today's visit was spent counseling the patient regarding cancer its association with risk for VTE.  2. She will continue rivaroxaban 20 mg daily as treatment for her distal DVT until September 5.  3. She will then continue rivaroxaban 20mg daily as secondary prophylaxis for cancer-associated VTE (and increased risk due to chemotherapy) indefinitely.  HOLD rivaroxaban when platelets are < 50 due to increased risk of bleeding.  This may require more intensive monitoring of her platelet count while on adjuvant chemotherapy.  4. She will return in one year to discuss whether to continue treatment.  This decision is primarily related to her risk of bleeding versus risk of cancer recurrence as her main risk factor for VTE recurrence.    The patient is given our center's contact information and is instructed to call if she should have any further questions or concerns.  Otherwise, we will plan on seeing her back in 1 year.      Total Time Spent:  I spent a total of 40 minutes face-to-face with Hafsa Corona during today's office visit.  Over 50% of this time was spent counseling the patient and/or coordinating care regarding cancer-associated VTE.      William Villarreal MD   of Medicine  [facility]

## 2017-08-14 NOTE — PROGRESS NOTES
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 She is here today for disease management prior to cycle #1 carbo/taxol chemotherapy.     Patient reports she is slowly recovering from surgery. She was having a decreased appetite but that is slowly improving. She notes difficulty with sleeping but was started on trazadone by her PCP and feels like it is helping. Chronic issues with visual difficulty of the left eye d/t cataract. Chronic mild back pain. Notes anxiety, nervousness, and feeling angry. Denies suicidal or homicidal ideation. She notes she has a good support system and is following up with clinical psychology. She talks about her two dogs Sparky and Annalise and one cat Selena.She is following with hematology regarding previous DVT and is taking xarelto. No other complaints.     Treatment History:  The patient has had a recent episode of lower abdominal pain in early July.  She was subsequently sent to the emergency room and was found to have a large 9 cm complex ovarian mass. She was admitted to the hospital for pain control.  17: Exploratory laparotomy, total abdominal hysterectomy, left salpingo-oophorectomy, cancer staging including infracolic omentectomy, bilateral pelvic & para-aortic lymphadenectomy, peritoneal biopsies, evacuation of pelvic fluid by Dr. Cole.    FINAL DIAGNOSIS:   A. LEFT OVARY AND FALLOPIAN TUBE, LEFT SALPINGO-OOPHORECTOMY:   - Ovarian clear cell carcinoma   - Background of endometriosis   - Fallopian tube with no significant histologic abnormality.   B. UTERUS, HYSTERECTOMY:   -  Inactive endometrium   -  Myometrium with adenomyosis and leiomyoma.   -  Cervix with squamous metaplasia.   C. PERITONEUM, RIGHT PELVIS, BIOPSY:   - Fibroadipose tissue, negative for malignancy.   D. LYMPH NODES, RIGHT PELVIC,  DISSECTION:   - Thirteen benign lymph nodes (0/13).   E. LYMPH NODES, LEFT PELVIC, DISSECTION:   - Seven benign lymph nodes (0/7).   F. PERITONEUM, LEFT PELVIS, BIOPSY:   - Fibroadipose tissue, negative for malignancy.   G. PERITONEUM, BLADDER, BIOPSY:   - Fibroadipose tissue  with acute and chronic inflammation, negative for malignancy.   H. PERITONEUM, POSTERIOR CUL-DE-SAC, BIOPSY:   - Fibroadipose tissue, negative for malignancy.   I. PERITONEUM, LEFT PARACOLIC GUTTER, BIOPSY:   - Fibroadipose tissue, negative for malignancy.   J. LYMPH NODES, LEFT PARA-AORTIC, DISSECTION:   - Five benign lymph nodes (0/5).   K. LYMPH NODES, RIGHT PARA-AORTIC, DISSECTION:   - Two benign lymph nodes (0/2).   L. PERITONEUM, RIGHT PARACOLIC GUTTER, BIOPSY:   - Fibroadipose tissue, negative for malignancy.   M. OMENTUM, OMENTECTOMY:   - Adipose tissue with reactive changes, negative for malignancy.   COMMENT:   The final diagnosis confirms the interpretation provided intraoperatively.   Report Name: Ovary or Fallopian Tube   Status: Submitted   Part(s) Involved:   A: Ovary and fallopian tube, left   Synoptic Report:   CLINICAL   Clinical History:   - Pelvic mass   SPECIMEN   Procedure:   - Left salpingo-oophorectomy   - Hysterectomy   - Omentectomy   - Peritoneal  biopsies   Lymph Node Sampling:   - Performed   Location:   - Pelvic lymph nodes   - Para-aortic lymph nodes   Specimen Integrity:   - Left ovary   Specimen Integrity of Left Ovary:   - Capsule intact   TUMOR   Primary Tumor Site(s):   - Left ovary   Left Ovary   Tumor Size of Left Ovary: 19 cm   Histologic Type:   - Clear cell carcinoma   Tumor Extent   Ovarian Surface Involvement:   - Absent   Specimen(s)   Extent of Left Ovary:   - Involved   Extent of Left Fallopian Tube:   - Not involved   Extent of Omentum:   - Not involved   Extent of Uterus:   - Not involved   Extent of Peritoneum:   - Not involved   Peritoneal Ascitic Fluid:   - Not performed / unknown   Pleural  Fluid:   - Not performed / unknown   LYMPH NODES     Number of Pelvic Lymph Nodes Examined: 20     Number of Pelvic Lymph Nodes Involved: None identified     Number of Para-aortic Lymph Nodes Examined: 7     Number of Para-Aortic Lymph Nodes Involved: None identified   STAGE (PTNM, AJCC 7TH ED.)   Primary Tumor (pT):   - Ovary   Ovarian Primary Tumor (pT):   - pT1a: Tumor limited to 1 ovary; capsule intact, no tumor on ovarian surface. No malignant cells in ascites or peritoneal washings#   Regional Lymph Nodes (pN):   - pN0: No regional lymph node metastasis     07/31/17: Dr Shaffer follow up: Discussed with the patient that given the high risk histology, as well as ruptured mass before surgery, she would be qualified at higher risk of recurrence despite early stage ovarian cancer.  Recommended adjuvant chemotherapy. Plan for carboplatin of AUC of 6 and paclitaxel of 175, m2 for 3 cycles. Referral for genetic counselor and will see her back after those 3 cycles  08/03/17: Call from patient stating she is going for a second opinion and would like chemotherapy rescheduled to week of 8/14/17.    08/16/17: Cycle #1 Carbo/Taxol.  Pending    Review of Systems:    Systemic           no weight changes; no fever; no chills; no night sweats; + slight decrease in appetite is improving  Skin           no rashes, or lesions  Eye           no irritation; no changes in vision  Nhan-Laryngeal           no dysphagia; no hoarseness   Pulmonary    no cough; no shortness of breath  Cardiovascular    no chest pain; no palpitations  Gastrointestinal    no diarrhea; no constipation; no abdominal pain; no changes in bowel  habits; no blood in stool  Genitourinary   no urinary frequency; no urinary urgency; no dysuria; no pain; no abnormal vaginal discharge; no abnormal vaginal bleeding  Musculoskeletal    no myalgias; no arthralgias; + back pain  Psychiatric           no depressed mood; + anxiety + nervousness + feelings of anger     Hematologic           no tender lymph nodes; no noticeable swellings or lumps   Endocrine    no hot flashes; no heat/cold intolerance         Neurological   no tremor; no numbness and tingling; no headaches; + difficulty  sleeping      Past Medical History:    Past Medical History:   Diagnosis Date     Anxiety state, unspecified     [id number]     Attention deficit disorder without mention of hyperactivity      Chronic rhinitis      Depressive disorder, not elsewhere classified      Panic disorder without agoraphobia      Pure hypercholesterolemia     Lipitor     Tachycardia, unspecified     9/1999-2/2004     Type II or unspecified type diabetes mellitus without mention of complication, not stated as uncontrolled     diagnosed 2004         Past Surgical History:    Past Surgical History:   Procedure Laterality Date     HYSTERECTOMY TOTAL ABDOMINAL, BILATERAL SALPINGO-OOPHORECTOMY, NODE DISSECTION, COMBINED Left 7/7/2017    Procedure: COMBINED HYSTERECTOMY TOTAL ABDOMINAL, SALPINGO-OOPHORECTOMY, NODE DISSECTION;  Exploratory Laparotomy, Left Salpingo-Oophorectomy, Cancer Staging, Total Hysterectomy, Omentectomy, Evacuation of abdominal fluid, Lymph Node Dissection  Anesthesia Block ;  Surgeon: Serena Cole MD;  Location: [facility] OR     HYSTERECTOMY, PAP NO LONGER INDICATED  07/07/2017    Laparotomy; for ovarian cancer staging     LYMPHADENECTOMY RETROPERITONEAL Bilateral 07/07/2017    Laparotomy; pelvics & para-aortics; for ovarian cancer staging     OMENTECTOMY  07/07/2017    Laparotomy; for ovarian cancer staging     SALPINGO OOPHORECTOMY,R/L/KAYY Right 2008    Salpingo Oophorectomy, RT     SALPINGO OOPHORECTOMY,R/L/KAYY Left 07/07/2017    Laparotomy; for ovarian cancer staging     SURGICAL HISTORY OF -       facial surgery d/t fall in 1/2002     SURGICAL HISTORY OF -       1985 removal of breast cyst     SURGICAL HISTORY OF -   2008    endometrial ablation         Health Maintenance Due   Topic Date Due      "ADVANCE DIRECTIVE PLANNING Q5 YRS  03/09/2009     MAMMO SCREEN Q2 YR (SYSTEM ASSIGNED)  12/01/2016     EYE EXAM Q1 YEAR  07/01/2017     LIPID MONITORING Q1 YEAR  09/06/2017     MICROALBUMIN Q1 YEAR  09/06/2017       Current Medications:     Current Outpatient Prescriptions   Medication Sig Dispense Refill     rivaroxaban ANTICOAGULANT (XARELTO) 20 MG TABS tablet Take 1 tablet (20 mg) by mouth daily (with dinner) 30 tablet 11     traZODone (DESYREL) 50 MG tablet TAKE 1 TO 2 TABLETS(50  MG) BY MOUTH EVERY NIGHT AS NEEDED FOR SLEEP 180 tablet 1     order for DME Plain packing strip 1/4\" 1 Bottle PRN     order for DME Sterile zoey tipped applicators 1 Box PRN     Multiple Vitamins-Minerals (MULTIVITAMIN ADULT PO) Take 1 tablet by mouth daily       vitamin B complex with vitamin C (VITAMIN  B COMPLEX) TABS tablet Take 1 tablet by mouth daily       order for DME Compression stockings 20-30 mm Hg. To be wear when directed by Hematology team and while awake for the first two years post clot. 1 each 0     CALCIUM-MAGNESIUM-VITAMIN D PO Take 2 tablets by mouth daily       Probiotic Product (PRO-BIOTIC BLEND PO) Take 1 capsule by mouth daily Enzymatic Therapy-probiotic pearls       atorvastatin (LIPITOR) 80 MG tablet Take 1 tablet (80 mg) by mouth daily 90 tablet PRN     order for DME Equipment being ordered: blood pressure monitor 1 Units 0     lisinopril (PRINIVIL/ZESTRIL) 10 MG tablet Take 1 tablet (10 mg) by mouth daily 90 tablet PRN     metFORMIN (GLUCOPHAGE-XR) 500 MG 24 hr tablet Take two tabs by mouth once daily with evening meal. (Patient taking differently: 1,000 mg daily (with dinner) Take two tabs by mouth once daily with evening meal.) 180 tablet PRN     escitalopram (LEXAPRO) 20 MG tablet Take 1 tablet (20 mg) by mouth daily 30 tablet PRN     fluticasone (FLONASE) 50 MCG/ACT nasal spray Spray 2 sprays into both nostrils daily 48 g 1     Cholecalciferol (VITAMIN D) 1000 UNIT capsule Take 2,000 Units by " mouth daily        LORazepam (ATIVAN) 1 MG tablet Take 1 tablet (1 mg) by mouth every 6 hours as needed (nausea/vomiting, anxiety or sleep) (Patient not taking: Reported on 8/16/2017) 30 tablet 1     prochlorperazine (COMPAZINE) 10 MG tablet Take 1 tablet (10 mg) by mouth every 6 hours as needed (nausea/vomiting) (Patient not taking: Reported on 8/16/2017) 30 tablet 2     acetaminophen (TYLENOL) 325 MG tablet Take 2 tablets (650 mg) by mouth every 6 hours as needed for mild pain (Patient not taking: Reported on 8/16/2017) 100 tablet 0     polyethylene glycol (MIRALAX/GLYCOLAX) Packet Take 17 g by mouth daily (Patient not taking: Reported on 7/31/2017) 7 packet 0     senna-docusate (SENOKOT-S;PERICOLACE) 8.6-50 MG per tablet Take 2 tablets by mouth 2 times daily Start with 1 tablet PO BID, If no bowel movement in 24 hours, increase to 2 tablets PO BID.  Hold for loose stools. (Patient not taking: Reported on 7/31/2017) 100 tablet 0     Lisdexamfetamine Dimesylate (VYVANSE PO) Take 50 mg by mouth daily       EPINEPHrine (EPIPEN) 0.3 MG/0.3ML injection Inject 0.3 mLs (0.3 mg) into the muscle once as needed for anaphylaxis (Patient not taking: Reported on 7/31/2017) 1 each PRN     albuterol (ALBUTEROL) 108 (90 BASE) MCG/ACT inhaler Inhale 2 puffs into the lungs 4 times daily as needed for shortness of breath / dyspnea (Patient not taking: Reported on 7/31/2017) 1 Inhaler PRN         Allergies:        Allergies   Allergen Reactions     Sulfa Drugs      Wasps [Hornets]         Social History:     Social History   Substance Use Topics     Smoking status: Never Smoker     Smokeless tobacco: Never Used     Alcohol use Yes      Comment: rarely       History   Drug Use No         Family History:     The patient's family history is notable for     Family History   Problem Relation Age of Onset     C.A.D. Father      DIABETES Father      Hypertension Father      CEREBROVASCULAR DISEASE Father      Psychotic Disorder Father       "Pancreatic Cancer Father      C.A.D. Mother      Hypertension Mother      Breast Cancer Mother      Psychotic Disorder Mother      Colon Cancer Mother      CANCER Mother      Bone cancer     Prostate Problems Brother      cleared      Psychotic Disorder Sister      x2     Neurologic Disorder Sister      Psychotic Disorder Brother      x2     Depression Brother      major depressive disorder and OCD     Anxiety Disorder Brother      MENTAL ILLNESS Brother      Psychotic Disorder Maternal Grandmother      ?     Psychotic Disorder Maternal Grandfather      ?     Psychotic Disorder Paternal Grandmother      Schizophernia     Psychotic Disorder Paternal Grandfather      ?     Respiratory Paternal Grandfather       of emphysema and smoking     Psychotic Disorder Sister      Other - See Comments Sister      small kidney stone      Cancer - colorectal No family hx of          Physical Exam:     /76  Pulse 84  Temp 98.4  F (36.9  C) (Oral)  Resp 16  Ht 1.6 m (5' 2.99\")  Wt 82.4 kg (181 lb 9.6 oz)  SpO2 95%  BMI 32.18 kg/m2  Body mass index is 32.18 kg/(m^2).    General Appearance: healthy and alert, no distress     HEENT:  no palpable nodules or masses        Cardiovascular: regular rate and rhythm, no gallops, rubs or murmurs     Respiratory: lungs clear, no rales, rhonchi or wheezes    Musculoskeletal: extremities non tender and without edema    Skin: no lesions or rashes     Neurological: normal gait, no gross defects     Psychiatric: appropriate mood and affect              Gastrointestinal:       abdomen soft, non-tender, non-distended, no organomegaly or masses    Assessment:    Hafsa Corona is a 62 year old woman with a diagnosis of stage IC2 clear cell carcinoma of the ovary.       Plan:     1.)   Stage IC2 clear cell carcinoma of the ovary. Labs reviewed with patient at todays visit. Chemotherapy teaching completed by Shanon Oswald RN. I reviewed possible sideeffects at todays visit. Cycle #1 " Carbo/Taxol. Plan to complete 3 cycle of carbo/taxol and follow up with Dr Shaffer.     2.) Genetic risk factors were assessed and the patient does meet the qualifications for a referral.      3.) Labs and/or tests ordered include:  CBC, CMP. Mag,      4.) Health maintenance issues addressed today include follow up with PCP regarding comorbid conditions and routine health maintenance  5.) Anxiety/Nervousness/Anger - patient is to follow up with clinical psychology.  6.) DVT - continue to take xarelto. Patient reports hematology would like her labs drawn in a couple of weeks. She will return in 2 weeks for labs.     Concepcion Kohler CNP  8/16/2017 5:03 PM          CC  Patient Care Team:  Morelia Ayon NP as PCP - Karla Perez, RN as Continuity Care Coordinator (Gyn-Onc)

## 2017-08-15 ENCOUNTER — CARE COORDINATION (OUTPATIENT)
Dept: ONCOLOGY | Facility: CLINIC | Age: 62
End: 2017-08-15

## 2017-08-15 DIAGNOSIS — Z79.899 ENCOUNTER FOR LONG-TERM (CURRENT) USE OF MEDICATIONS: ICD-10-CM

## 2017-08-15 DIAGNOSIS — C56.2 OVARIAN CANCER, LEFT (H): Primary | ICD-10-CM

## 2017-08-15 RX ORDER — LORAZEPAM 1 MG/1
1 TABLET ORAL EVERY 6 HOURS PRN
Qty: 30 TABLET | Refills: 1 | Status: SHIPPED | OUTPATIENT
Start: 2017-08-15 | End: 2017-10-04

## 2017-08-15 RX ORDER — PROCHLORPERAZINE MALEATE 10 MG
10 TABLET ORAL EVERY 6 HOURS PRN
Qty: 30 TABLET | Refills: 2 | Status: SHIPPED | OUTPATIENT
Start: 2017-08-15 | End: 2018-03-02

## 2017-08-15 NOTE — PROGRESS NOTES
Care Coordinator Note  Returned call from patient.  Appointment schedule for 8/16/17 reviewed.  Plan for next cycles discussed.  Questions regarding antiemetics discussed. Patient states that Dr. Villarreal was going to order labs for mid chemotherapy cycle lab check related to Xarelto.  She also discussed her plan to have cataract surgery after chemo cycle #3.  Patient encouraged to write her questions down for review at her clinic appointment 8/16/17.                                    Patient verbalized back understanding of the above information discussed.     Shanon Oswald MSN, RN  Care Coordinator  Gynecologic Cancer   Office:  [phone]  Pager: [phone] #[phone]

## 2017-08-15 NOTE — PROGRESS NOTES
Care Coordinator Note  Returned call from patient.  Left message.    Shanon Oswald MSN, RN  Care Coordinator  Gynecologic Cancer   Office:  [phone]  Pager: [phone] #[phone]

## 2017-08-16 ENCOUNTER — INFUSION THERAPY VISIT (OUTPATIENT)
Dept: ONCOLOGY | Facility: CLINIC | Age: 62
End: 2017-08-16
Attending: INTERNAL MEDICINE
Payer: COMMERCIAL

## 2017-08-16 ENCOUNTER — ONCOLOGY VISIT (OUTPATIENT)
Dept: ONCOLOGY | Facility: CLINIC | Age: 62
End: 2017-08-16
Attending: NURSE PRACTITIONER
Payer: COMMERCIAL

## 2017-08-16 ENCOUNTER — OFFICE VISIT (OUTPATIENT)
Dept: INTERNAL MEDICINE | Facility: CLINIC | Age: 62
End: 2017-08-16

## 2017-08-16 ENCOUNTER — CARE COORDINATION (OUTPATIENT)
Dept: ONCOLOGY | Facility: CLINIC | Age: 62
End: 2017-08-16

## 2017-08-16 VITALS
OXYGEN SATURATION: 98 % | HEART RATE: 90 BPM | SYSTOLIC BLOOD PRESSURE: 102 MMHG | DIASTOLIC BLOOD PRESSURE: 56 MMHG | RESPIRATION RATE: 18 BRPM

## 2017-08-16 VITALS
OXYGEN SATURATION: 96 % | HEART RATE: 87 BPM | DIASTOLIC BLOOD PRESSURE: 70 MMHG | RESPIRATION RATE: 20 BRPM | SYSTOLIC BLOOD PRESSURE: 119 MMHG

## 2017-08-16 VITALS
HEIGHT: 63 IN | DIASTOLIC BLOOD PRESSURE: 76 MMHG | HEART RATE: 84 BPM | TEMPERATURE: 98.4 F | RESPIRATION RATE: 16 BRPM | WEIGHT: 181.6 LBS | SYSTOLIC BLOOD PRESSURE: 109 MMHG | OXYGEN SATURATION: 95 % | BODY MASS INDEX: 32.18 KG/M2

## 2017-08-16 DIAGNOSIS — C56.2 OVARIAN CANCER, LEFT (H): Primary | ICD-10-CM

## 2017-08-16 DIAGNOSIS — C56.2 OVARIAN CANCER, LEFT (H): ICD-10-CM

## 2017-08-16 DIAGNOSIS — Z79.899 ENCOUNTER FOR LONG-TERM (CURRENT) USE OF MEDICATIONS: ICD-10-CM

## 2017-08-16 DIAGNOSIS — Z85.43 PERSONAL HISTORY OF OVARIAN CANCER: Primary | ICD-10-CM

## 2017-08-16 DIAGNOSIS — F43.20 ADJUSTMENT REACTION: Primary | ICD-10-CM

## 2017-08-16 LAB
ALBUMIN SERPL-MCNC: 3.5 G/DL (ref 3.4–5)
ALP SERPL-CCNC: 96 U/L (ref 40–150)
ALT SERPL W P-5'-P-CCNC: 14 U/L (ref 0–50)
ANION GAP SERPL CALCULATED.3IONS-SCNC: 9 MMOL/L (ref 3–14)
AST SERPL W P-5'-P-CCNC: 12 U/L (ref 0–45)
BASOPHILS # BLD AUTO: 0.1 10E9/L (ref 0–0.2)
BASOPHILS NFR BLD AUTO: 0.8 %
BILIRUB SERPL-MCNC: 0.4 MG/DL (ref 0.2–1.3)
BUN SERPL-MCNC: 20 MG/DL (ref 7–30)
CALCIUM SERPL-MCNC: 8.9 MG/DL (ref 8.5–10.1)
CANCER AG125 SERPL-ACNC: 73 U/ML (ref 0–30)
CHLORIDE SERPL-SCNC: 107 MMOL/L (ref 94–109)
CO2 SERPL-SCNC: 24 MMOL/L (ref 20–32)
CREAT SERPL-MCNC: 0.78 MG/DL (ref 0.52–1.04)
DIFFERENTIAL METHOD BLD: ABNORMAL
EOSINOPHIL # BLD AUTO: 0.3 10E9/L (ref 0–0.7)
EOSINOPHIL NFR BLD AUTO: 4.8 %
ERYTHROCYTE [DISTWIDTH] IN BLOOD BY AUTOMATED COUNT: 14.9 % (ref 10–15)
GFR SERPL CREATININE-BSD FRML MDRD: 74 ML/MIN/1.7M2
GLUCOSE SERPL-MCNC: 120 MG/DL (ref 70–99)
HCT VFR BLD AUTO: 34 % (ref 35–47)
HGB BLD-MCNC: 10.6 G/DL (ref 11.7–15.7)
IMM GRANULOCYTES # BLD: 0 10E9/L (ref 0–0.4)
IMM GRANULOCYTES NFR BLD: 0.3 %
LYMPHOCYTES # BLD AUTO: 2.3 10E9/L (ref 0.8–5.3)
LYMPHOCYTES NFR BLD AUTO: 37.8 %
MAGNESIUM SERPL-MCNC: 2.1 MG/DL (ref 1.6–2.3)
MCH RBC QN AUTO: 27.4 PG (ref 26.5–33)
MCHC RBC AUTO-ENTMCNC: 31.2 G/DL (ref 31.5–36.5)
MCV RBC AUTO: 88 FL (ref 78–100)
MONOCYTES # BLD AUTO: 0.6 10E9/L (ref 0–1.3)
MONOCYTES NFR BLD AUTO: 9.2 %
NEUTROPHILS # BLD AUTO: 2.9 10E9/L (ref 1.6–8.3)
NEUTROPHILS NFR BLD AUTO: 47.1 %
NRBC # BLD AUTO: 0 10*3/UL
NRBC BLD AUTO-RTO: 0 /100
PLATELET # BLD AUTO: 382 10E9/L (ref 150–450)
POTASSIUM SERPL-SCNC: 4.2 MMOL/L (ref 3.4–5.3)
PROT SERPL-MCNC: 7.4 G/DL (ref 6.8–8.8)
RBC # BLD AUTO: 3.87 10E12/L (ref 3.8–5.2)
SODIUM SERPL-SCNC: 140 MMOL/L (ref 133–144)
WBC # BLD AUTO: 6.2 10E9/L (ref 4–11)

## 2017-08-16 PROCEDURE — 99214 OFFICE O/P EST MOD 30 MIN: CPT | Mod: 24 | Performed by: NURSE PRACTITIONER

## 2017-08-16 PROCEDURE — 25000128 H RX IP 250 OP 636

## 2017-08-16 PROCEDURE — S0028 INJECTION, FAMOTIDINE, 20 MG: HCPCS | Mod: ZF | Performed by: OBSTETRICS & GYNECOLOGY

## 2017-08-16 PROCEDURE — 36415 COLL VENOUS BLD VENIPUNCTURE: CPT

## 2017-08-16 PROCEDURE — 99212 OFFICE O/P EST SF 10 MIN: CPT | Mod: ZF

## 2017-08-16 PROCEDURE — 99215 OFFICE O/P EST HI 40 MIN: CPT | Mod: 25

## 2017-08-16 PROCEDURE — 25000128 H RX IP 250 OP 636: Performed by: OBSTETRICS & GYNECOLOGY

## 2017-08-16 PROCEDURE — 96409 CHEMO IV PUSH SNGL DRUG: CPT

## 2017-08-16 PROCEDURE — 83735 ASSAY OF MAGNESIUM: CPT | Performed by: OBSTETRICS & GYNECOLOGY

## 2017-08-16 PROCEDURE — 80053 COMPREHEN METABOLIC PANEL: CPT | Performed by: OBSTETRICS & GYNECOLOGY

## 2017-08-16 PROCEDURE — 96375 TX/PRO/DX INJ NEW DRUG ADDON: CPT

## 2017-08-16 PROCEDURE — 86304 IMMUNOASSAY TUMOR CA 125: CPT | Performed by: OBSTETRICS & GYNECOLOGY

## 2017-08-16 PROCEDURE — 25000125 ZZHC RX 250: Mod: ZF | Performed by: OBSTETRICS & GYNECOLOGY

## 2017-08-16 PROCEDURE — 85025 COMPLETE CBC W/AUTO DIFF WBC: CPT | Performed by: OBSTETRICS & GYNECOLOGY

## 2017-08-16 PROCEDURE — 25000128 H RX IP 250 OP 636: Mod: ZF | Performed by: OBSTETRICS & GYNECOLOGY

## 2017-08-16 PROCEDURE — 96372 THER/PROPH/DIAG INJ SC/IM: CPT | Mod: 59

## 2017-08-16 PROCEDURE — 40000556 ZZH STATISTIC PERIPHERAL IV START W US GUIDANCE: Mod: ZF

## 2017-08-16 PROCEDURE — 96367 TX/PROPH/DG ADDL SEQ IV INF: CPT

## 2017-08-16 RX ORDER — MEPERIDINE HYDROCHLORIDE 25 MG/ML
25 INJECTION INTRAMUSCULAR; INTRAVENOUS; SUBCUTANEOUS EVERY 30 MIN PRN
Status: DISCONTINUED | OUTPATIENT
Start: 2017-08-16 | End: 2017-08-24 | Stop reason: HOSPADM

## 2017-08-16 RX ORDER — EPINEPHRINE 0.3 MG/.3ML
INJECTION SUBCUTANEOUS
Status: COMPLETED
Start: 2017-08-16 | End: 2017-08-16

## 2017-08-16 RX ORDER — ALBUTEROL SULFATE 0.83 MG/ML
1 SOLUTION RESPIRATORY (INHALATION)
Status: DISCONTINUED | OUTPATIENT
Start: 2017-08-16 | End: 2017-08-24 | Stop reason: HOSPADM

## 2017-08-16 RX ORDER — EPINEPHRINE 0.3 MG/.3ML
0.3 INJECTION SUBCUTANEOUS EVERY 5 MIN PRN
Status: DISCONTINUED | OUTPATIENT
Start: 2017-08-16 | End: 2017-08-24 | Stop reason: HOSPADM

## 2017-08-16 RX ORDER — DIPHENHYDRAMINE HYDROCHLORIDE 50 MG/ML
50 INJECTION INTRAMUSCULAR; INTRAVENOUS
Status: COMPLETED | OUTPATIENT
Start: 2017-08-16 | End: 2017-08-16

## 2017-08-16 RX ORDER — PALONOSETRON 0.05 MG/ML
0.25 INJECTION, SOLUTION INTRAVENOUS ONCE
Status: COMPLETED | OUTPATIENT
Start: 2017-08-16 | End: 2017-08-16

## 2017-08-16 RX ORDER — METHYLPREDNISOLONE SODIUM SUCCINATE 125 MG/2ML
125 INJECTION, POWDER, LYOPHILIZED, FOR SOLUTION INTRAMUSCULAR; INTRAVENOUS
Status: DISCONTINUED | OUTPATIENT
Start: 2017-08-16 | End: 2017-08-24 | Stop reason: HOSPADM

## 2017-08-16 RX ORDER — SODIUM CHLORIDE 9 MG/ML
1000 INJECTION, SOLUTION INTRAVENOUS CONTINUOUS PRN
Status: DISCONTINUED | OUTPATIENT
Start: 2017-08-16 | End: 2017-08-24 | Stop reason: HOSPADM

## 2017-08-16 RX ORDER — ALBUTEROL SULFATE 0.83 MG/ML
2.5 SOLUTION RESPIRATORY (INHALATION)
Status: DISCONTINUED | OUTPATIENT
Start: 2017-08-16 | End: 2017-08-24 | Stop reason: HOSPADM

## 2017-08-16 RX ADMIN — DIPHENHYDRAMINE HYDROCHLORIDE 50 MG: 50 INJECTION, SOLUTION INTRAMUSCULAR; INTRAVENOUS at 10:34

## 2017-08-16 RX ADMIN — SODIUM CHLORIDE 250 ML: 9 INJECTION, SOLUTION INTRAVENOUS at 10:02

## 2017-08-16 RX ADMIN — EPINEPHRINE 0.3 MG: 0.3 INJECTION INTRAMUSCULAR at 12:16

## 2017-08-16 RX ADMIN — FAMOTIDINE 40 MG: 10 INJECTION, SOLUTION INTRAVENOUS at 11:22

## 2017-08-16 RX ADMIN — DEXAMETHASONE SODIUM PHOSPHATE 20 MG: 10 INJECTION, SOLUTION INTRAMUSCULAR; INTRAVENOUS at 10:03

## 2017-08-16 RX ADMIN — METHYLPREDNISOLONE SODIUM SUCCINATE 125 MG: 125 INJECTION, POWDER, FOR SOLUTION INTRAMUSCULAR; INTRAVENOUS at 12:12

## 2017-08-16 RX ADMIN — PACLITAXEL 345 MG: 6 INJECTION, SOLUTION INTRAVENOUS at 12:05

## 2017-08-16 RX ADMIN — PALONOSETRON HYDROCHLORIDE 0.25 MG: 0.25 INJECTION INTRAVENOUS at 10:30

## 2017-08-16 RX ADMIN — DIPHENHYDRAMINE HYDROCHLORIDE 50 MG: 50 INJECTION, SOLUTION INTRAMUSCULAR; INTRAVENOUS at 12:11

## 2017-08-16 RX ADMIN — SODIUM CHLORIDE 1000 ML: 9 INJECTION, SOLUTION INTRAVENOUS at 12:10

## 2017-08-16 ASSESSMENT — PAIN SCALES - GENERAL: PAINLEVEL: NO PAIN (1)

## 2017-08-16 NOTE — MR AVS SNAPSHOT
After Visit Summary   8/16/2017    Hafsa Corona    MRN: [id number]           Patient Information     Date Of Birth          1955        Visit Information        Provider Department      8/16/2017 9:00 AM  11 ATC;  ONCOLOGY INFUSION [facility]        Today's Diagnoses     Ovarian cancer, left (H)    -  1    Encounter for long-term (current) use of medications          Care Instructions    Contact Numbers    [facility] Main Line: [phone]    Triage Nurse Line: [phone]      Please call the [facility] Nurse Triage line if you experience a temperature greater than or equal to 100.5, shaking chills, have uncontrolled nausea, vomiting and/or diarrhea, dizziness, shortness of breath, bleeding not relieved with pressure, or if you have any other questions or concerns.     If it is after hours, weekends, or holidays, please call the main hospital  at  [phone] and ask to speak to the adult Oncology doctor on call.     If you are having any concerning symptoms or wish to speak to a provider before your next infusion visit, please call your care coordinator or triage them so we can adequately serve you.      If you need to refill your narcotic prescription or other medication, please call triage before your infusion appointment.        August 2017 Sunday Monday Tuesday Wednesday Thursday Friday Saturday             1     LYMPHEDEMA EVALUATION    1:30 PM   (75 min.)   Yesenia Camacho, PT   [facility] [place] Lymphedema 2     LONG    2:15 PM   (30 min.)   Morelia Ayon, NP   [facility]     CT CHEST WWO    3:45 PM   (20 min.)   UCCT1   [facility] CT 3     LAB    3:45 PM   (15 min.)   HP LAB   [facility] 4     5       6     7     8     LAB    3:00 PM   (15 min.)    LAB   [facility] 9     10     11     LAB   12:45 PM   (15 min.)    LAB   [facility] Lab     US LWR EXT VENOUS DUPLEX  LEFT    1:00 PM   (60 min.)   UCUSV1   [facility] US     RETURN CLOTTING DISORDER    1:45 PM   (30 min.)   William Villarreal MD   [facility]eding [facility] Clotting 12       13     14     15     16     [facility] DRAW    7:30 AM   (15 min.)   [facility]   [facility] Lab Draw     [facility] RETURN ACTIVE TREATMENT    7:45 AM   (30 min.)   Concepcion Kohler APRN CNP   [facility] ONC INFUSION 360    9:00 AM   (360 min.)   UC ONCOLOGY INFUSION   [facility]     [facility] NEW   12:15 PM   (30 min.)   Pavan Gallegos LMFT   [facility] 17     18     19       20     21     MA SCREENING DIGITAL BILATERAL    2:45 PM   (15 min.)   UCBCMA1   [facility] 22     23     24     25     26       27     28     29     30     [facility] DRAW    8:15 AM   (15 min.)    [facility] DRAW   [facility] Lab Draw 31 September 2017 Sunday Monday Tuesday Wednesday Thursday Friday Saturday                            1     [facility] NEW WITH ROOM    2:00 PM   (75 min.)   Mariam Sams GC   [facility] 2       3     4     5     [facility] DRAW    8:30 AM   (15 min.)    [facility] DRAW   [facility] Lab Draw     [facility] RETURN ACTIVE TREATMENT    8:45 AM   (30 min.)   Concepcion Kohler APRN CNP   [facility] 6     7     8     9       10     11     12     [facility] NEW WITH ROOM    2:45 PM   (60 min.)   Lola Vasquez, PhD [facility] 13     14     15     16       17     18     19     20     21     NEW   11:00 AM   (60 min.)   Lucina Reyes, [facility] 22     23       24     25     26     27     [facility] DRAW    7:45 AM   (15 min.)   [facility] D[facility]   [facility] Lab Draw     [facility] RETURN ACTIVE TREATMENT    8:05 AM   (40 min.)   Augustina Noriega APRN CNP   [facility]      [facility] ONC INFUSION 360    9:00 AM   (360 min.)    ONCOLOGY INFUSION   [facility] 28     29     30                 Recent Results (from the past 24 hour(s))       Collection Time: 08/16/17  7:33 AM   Result Value Ref Range     73 (H) 0 - 30 U/mL   CBC with platelets differential    Collection Time: 08/16/17  7:33 AM   Result Value Ref Range    WBC 6.2 4.0 - 11.0 10e9/L    RBC Count 3.87 3.8 - 5.2 10e12/L    Hemoglobin 10.6 (L) 11.7 - 15.7 g/dL    Hematocrit 34.0 (L) 35.0 - 47.0 %    MCV 88 78 - 100 fl    MCH 27.4 26.5 - 33.0 pg    MCHC 31.2 (L) 31.5 - 36.5 g/dL    RDW 14.9 10.0 - 15.0 %    Platelet Count 382 150 - 450 10e9/L    Diff Method Automated Method     % Neutrophils 47.1 %    % Lymphocytes 37.8 %    % Monocytes 9.2 %    % Eosinophils 4.8 %    % Basophils 0.8 %    % Immature Granulocytes 0.3 %    Nucleated RBCs 0 0 /100    Absolute Neutrophil 2.9 1.6 - 8.3 10e9/L    Absolute Lymphocytes 2.3 0.8 - 5.3 10e9/L    Absolute Monocytes 0.6 0.0 - 1.3 10e9/L    Absolute Eosinophils 0.3 0.0 - 0.7 10e9/L    Absolute Basophils 0.1 0.0 - 0.2 10e9/L    Abs Immature Granulocytes 0.0 0 - 0.4 10e9/L    Absolute Nucleated RBC 0.0    Comprehensive metabolic panel    Collection Time: 08/16/17  7:33 AM   Result Value Ref Range    Sodium 140 133 - 144 mmol/L    Potassium 4.2 3.4 - 5.3 mmol/L    Chloride 107 94 - 109 mmol/L    Carbon Dioxide 24 20 - 32 mmol/L    Anion Gap 9 3 - 14 mmol/L    Glucose 120 (H) 70 - 99 mg/dL    Urea Nitrogen 20 7 - 30 mg/dL    Creatinine 0.78 0.52 - 1.04 mg/dL    GFR Estimate 74 >60 mL/min/1.7m2    GFR Estimate If Black >90 >60 mL/min/1.7m2    Calcium 8.9 8.5 - 10.1 mg/dL    Bilirubin Total 0.4 0.2 - 1.3 mg/dL    Albumin 3.5 3.4 - 5.0 g/dL    Protein Total 7.4 6.8 - 8.8 g/dL    Alkaline Phosphatase 96 40 - 150 U/L    ALT 14 0 - 50 U/L    AST 12 0 - 45 U/L   Magnesium    Collection Time: 08/16/17  7:33 AM   Result Value Ref Range    Magnesium 2.1 1.6 - 2.3 mg/dL             "   Follow-ups after your visit        Your next 10 appointments already scheduled     Aug 21, 2017  3:00 PM CDT   (Arrive by 2:45 PM)   MA SCREENING DIGITAL BILATERAL with UCBCMA1   [facility] ([facility])    [address] [place] [id number] [phone]           Do not use any powder, lotion or deodorant under your arms or on your breast. If you do, we will ask you to remove it before your exam.  Wear comfortable, two-piece clothing.  If you have any allergies, tell your care team.  Bring any previous mammograms from other facilities or have them mailed to the breast center. Three-dimensional (3D) mammograms are available at [place] locations in [place], and [place]. [facility] locations include [place] and [facility] in [place]. Benefits of 3D mammograms include: - Improved rate of cancer detection - Decreases your chance of having to go back for more tests, which means fewer: - \"False-positive\" results (This means that there is an abnormal area but it isn't cancer.) - Invasive testing procedures, such as a biopsy or surgery - Can provide clearer images of the breast if you have dense breast tissue. 3D mammography is an optional exam that anyone can have with a 2D mammogram. It doesn't replace or take the place of a 2D mammogram. 2D mammograms remain an effective screening test for all women.  Not all insurance companies cover the cost of a 3D mammogram. Check with your insurance.            Aug 30, 2017  8:15 AM CDT   [facility] Lab Draw with  [facility] LAB DRAW   [facility] Lab Draw ([facility])    [address] [place] [id number] [phone]            Sep 01, 2017  2:15 PM CDT   (Arrive by 2:00 PM)   NEW WITH ROOM with Mariam Sams GC,  2 114 CONSULT [facility] ([facility] [place])    [address] [place] [phone]   [phone]            Sep 05, 2017  8:30 AM CDT   [facility] Draw with  [facility] LAB DRAW   [facility] Draw ([place] [facility])    [address] [place] [phone]   [phone]            Sep 05, 2017  9:00 AM CDT   (Arrive by 8:45 AM)   Return Active Treatment with MARLON Limon CNP   [facility] ([place] [facility])    [address] [place] [id number]   [phone]            Sep 12, 2017  3:00 PM CDT   (Arrive by 2:45 PM)   NEW WITH ROOM with Lola Vasquez, PhD LP,  2 [address] CONSULT [facility] ([place] [facility])    [address] [place] [phone]   [phone]            Sep 21, 2017 11:00 AM CDT   New Visit with Lucina Reyes, [facility] ([facility])    [address] [place] [phone]            Sep 27, 2017  7:45 AM CDT   [facility] Lab Draw with  [facility] LAB DRAW   [facility] Draw ([place] [facility])    [address] [place] [phone]   [phone]              Future tests that were ordered for you today     Open Standing Orders        Priority Remaining Interval Expires Ordered    Notify Physician Routine [id number] PRN  8/16/2017          Open Future Orders        Priority Expected Expires Ordered    CBC with platelets differential Routine 8/30/2017 8/16/2018 8/16/2017    CMP - Comprehensive Metabolic Panel Routine 8/30/2017 8/16/2018 8/16/2017    Magnesium Routine 8/30/2017 8/16/2018 8/16/2017            Who to contact     If you have questions or need follow up information about today's clinic visit or your schedule please contact [facility] directly at [phone].  Normal or non-critical  "lab and imaging results will be communicated to you by MyChart, letter or phone within 4 business days after the clinic has received the results. If you do not hear from us within 7 days, please contact the clinic through [employer] or phone. If you have a critical or abnormal lab result, we will notify you by phone as soon as possible.  Submit refill requests through [employer] or call your pharmacy and they will forward the refill request to us. Please allow 3 business days for your refill to be completed.          Additional Information About Your Visit        [employer] Information     [employer] lets you send messages to your doctor, view your test results, renew your prescriptions, schedule appointments and more. To sign up, go to [email or web address] . Click on \"Log in\" on the left side of the screen, which will take you to the Welcome page. Then click on \"Sign up Now\" on the right side of the page.     You will be asked to enter the access code listed below, as well as some personal information. Please follow the directions to create your username and password.     Your access code is: [email or web address]  Expires: 10/10/2017 11:16 AM     Your access code will  in 90 days. If you need help or a new code, please call your [place] clinic or [phone].        Care EveryWhere ID     This is your Care EveryWhere ID. This could be used by other organizations to access your [place] medical records  [id number]        Your Vitals Were     Pulse Respirations Pulse Oximetry             90 18 98%          Blood Pressure from Last 3 Encounters:   17 102/56   17 109/76   17 119/70    Weight from Last 3 Encounters:   17 82.4 kg (181 lb 9.6 oz)   17 82.3 kg (181 lb 8 oz)   17 83.9 kg (185 lb)              We Performed the Following          CBC with platelets differential     Comprehensive metabolic panel     Magnesium          Today's Medication Changes          These changes are " accurate as of: 8/16/17  3:40 PM.  If you have any questions, ask your nurse or doctor.               Start taking these medicines.        Dose/Directions    LORazepam 1 MG tablet   Commonly known as:  ATIVAN   Used for:  Ovarian cancer, left (H), Encounter for long-term (current) use of medications        Dose:  1 mg   Take 1 tablet (1 mg) by mouth every 6 hours as needed (nausea/vomiting, anxiety or sleep)   Quantity:  30 tablet   Refills:  1       prochlorperazine 10 MG tablet   Commonly known as:  COMPAZINE   Used for:  Ovarian cancer, left (H), Encounter for long-term (current) use of medications        Dose:  10 mg   Take 1 tablet (10 mg) by mouth every 6 hours as needed (nausea/vomiting)   Quantity:  30 tablet   Refills:  2         These medicines have changed or have updated prescriptions.        Dose/Directions    metFORMIN 500 MG 24 hr tablet   Commonly known as:  GLUCOPHAGE-XR   This may have changed:    - how much to take  - when to take this  - additional instructions   Used for:  Type 2 diabetes mellitus without complication, without long-term current use of insulin (H)        Take two tabs by mouth once daily with evening meal.   Quantity:  180 tablet   Refills:  PRN            Where to get your medicines      These medications were sent to [address] [address]    Hours:  TRANSPLANT PHONE NUMBER [phone] Phone:  [phone]     prochlorperazine 10 MG tablet         Some of these will need a paper prescription and others can be bought over the counter.  Ask your nurse if you have questions.     Bring a paper prescription for each of these medications     LORazepam 1 MG tablet                Primary Care Provider Office Phone # Fax #    Morelia Ayon [phone]       [address] [facility] [address]        Equal Access to Services     RONEY ARANDA AH: Ward lyons  ar Leach, wagabrielleda ludoraadaha, qaybta kaalmada néstor, sujatha marlenyin hayaaallan campuzanowood bragg lataimkaallan idalia. So [facility] [phone].    ATENCIÓN: Si leanala jonnie, tiene a martínez disposición servicios gratuitos de asistencia lingüística. Elizabeth al [phone].    We comply with applicable federal civil rights laws and [place] laws. We do not discriminate on the basis of race, color, national origin, age, disability sex, sexual orientation or gender identity.            Thank you!     Thank you for choosing [facility] CANC[facility]  for your care. Our goal is always to provide you with excellent care. Hearing back from our patients is one way we can continue to improve our services. Please take a few minutes to complete the written survey that you may receive in the mail after your visit with us. Thank you!             Your Updated Medication List - Protect others around you: Learn how to safely use, store and throw away your medicines at www.disposemymeds.org.          This list is accurate as of: 8/16/17  3:40 PM.  Always use your most recent med list.                   Brand Name Dispense Instructions for use Diagnosis    acetaminophen 325 MG tablet    TYLENOL    100 tablet    Take 2 tablets (650 mg) by mouth every 6 hours as needed for mild pain    Cellulitis and abscess of leg, Mass of pelvis       albuterol 108 (90 BASE) MCG/ACT Inhaler    albuterol    1 Inhaler    Inhale 2 puffs into the lungs 4 times daily as needed for shortness of breath / dyspnea    Cough       atorvastatin 80 MG tablet    LIPITOR    90 tablet    Take 1 tablet (80 mg) by mouth daily    Hyperlipidemia LDL goal <100       CALCIUM-MAGNESIUM-VITAMIN D PO      Take 2 tablets by mouth daily        EPINEPHrine 0.3 MG/0.3ML injection 2-pack    EPIPEN/ADRENACLICK/or ANY BX GENERIC EQUIV    1 each    Inject 0.3 mLs (0.3 mg) into the muscle once as needed for anaphylaxis    Bee allergy status       escitalopram 20 MG tablet    LEXAPRO    30 tablet  "   Take 1 tablet (20 mg) by mouth daily    Major depressive disorder, recurrent episode, mild (H)       fluticasone 50 MCG/ACT spray    FLONASE    48 g    Spray 2 sprays into both nostrils daily    Chronic maxillary sinusitis       lisinopril 10 MG tablet    PRINIVIL/ZESTRIL    90 tablet    Take 1 tablet (10 mg) by mouth daily    Essential hypertension       LORazepam 1 MG tablet    ATIVAN    30 tablet    Take 1 tablet (1 mg) by mouth every 6 hours as needed (nausea/vomiting, anxiety or sleep)    Ovarian cancer, left (H), Encounter for long-term (current) use of medications       metFORMIN 500 MG 24 hr tablet    GLUCOPHAGE-XR    180 tablet    Take two tabs by mouth once daily with evening meal.    Type 2 diabetes mellitus without complication, without long-term current use of insulin (H)       MULTIVITAMIN ADULT PO      Take 1 tablet by mouth daily        order for DME     1 Units    Equipment being ordered: blood pressure monitor    Essential hypertension       * order for DME     1 each    Compression stockings 20-30 mm Hg. To be wear when directed by Hematology team and while awake for the first two years post clot.    Long-term (current) use of anticoagulants, Acute deep vein thrombosis (DVT) of left lower extremity, unspecified vein (H)       * order for DME     1 Bottle    Plain packing strip 1/4\"    Abscess of leg       * order for DME     1 Box    Sterile zoey tipped applicators    Abscess of leg       polyethylene glycol Packet    MIRALAX/GLYCOLAX    7 packet    Take 17 g by mouth daily    Mass of pelvis       PRO-BIOTIC BLEND PO      Take 1 capsule by mouth daily Enzymatic Therapy-probiotic pearls        prochlorperazine 10 MG tablet    COMPAZINE    30 tablet    Take 1 tablet (10 mg) by mouth every 6 hours as needed (nausea/vomiting)    Ovarian cancer, left (H), Encounter for long-term (current) use of medications       rivaroxaban ANTICOAGULANT 20 MG Tabs tablet    XARELTO    30 tablet    Take 1 tablet " (20 mg) by mouth daily (with dinner)    Long-term (current) use of anticoagulants, Acute deep vein thrombosis (DVT) of left lower extremity, unspecified vein (H)       senna-docusate 8.6-50 MG per tablet    SENOKOT-S;PERICOLACE    100 tablet    Take 2 tablets by mouth 2 times daily Start with 1 tablet PO BID, If no bowel movement in 24 hours, increase to 2 tablets PO BID.  Hold for loose stools.    Mass of pelvis       traZODone 50 MG tablet    DESYREL    180 tablet    TAKE 1 TO 2 TABLETS(50  MG) BY MOUTH EVERY NIGHT AS NEEDED FOR SLEEP    Insomnia, unspecified type       vitamin B complex with vitamin C Tabs tablet      Take 1 tablet by mouth daily        vitamin D 1000 UNITS capsule      Take 2,000 Units by mouth daily        VYVANSE PO      Take 50 mg by mouth daily        * Notice:  This list has 3 medication(s) that are the same as other medications prescribed for you. Read the directions carefully, and ask your doctor or other care provider to review them with you.

## 2017-08-16 NOTE — PROGRESS NOTES
Care Coordinator Note  Called to see patient in infusion today.  IV infiltrated and patient did not want a restart to continue with chemotherapy today.  Patient would like a port placed and then have chemotherapy later in week.  Discussed port teaching, patient to check with Dr. Villarreal's office regarding Xarelto.  Discussed pre Dexamethasone 12 and 6 hour doses secondary to Taxol reaction today.  Patient also states she would like to talk with Dr. Shaffer prior to her chemotherapy.  She would appreciate a telephone call.        Patient verbalized back understanding of the above information discussed.   Face to face time spent with patient:  10 minutes    Shanon SMITH, RN  Care Coordinator  Gynecologic Cancer   Office:  [phone]  Pager: [phone] #[phone]

## 2017-08-16 NOTE — PROGRESS NOTES
Infusion Nursing Note:  Hafsa Corona presents today for C1D1 Taxol/CArboplatin- did not receive total cycle due to Taxol reaction.    Patient seen by provider today: Yes: Concepcion Kohler    Note: First time getting chemotherapy today. Oriented to infusion center. Chemotherapy teaching, side effects, and schedule reviewed with patient. General chemotherapy side effects reviewed with patient including fatigue, immunosuppression, alopecia, nausea/vomiting, constipation, and diarrhea. Individual drug common side effects reviewed with patient . Pt instructed to call care coordinator, triage (or MD on call if after hours/weekends) with chills/temp >=100.5, questions/concerns. Pt stated understanding of plan.     After receiving 13 cc of Taxol, patient reported chest tightness, facial flushing,and back pain.  Infusion stopped, rescue medications given, including one dose of epinephrine. Patient's symptoms and vitals were able to be controlled with rescue medications and iv fluids. Concepcion ROJAS, came to infusion center to assess patient and gave order to restart chemo once patient stabilized.  Prior to restarting patient's Taxol, patient's peripheral iv was extremely painful to the touch but positive blood return obtained.  Per patient request, iv removed and no new iv placed.  Patient voiced her concerns about restarting Taxol and wanted to speak with  prior to receiving any more chemotherapy.  Patient also voiced her desire to have a port placed.  This RN contacted Concepcion ROJAS and Shanon Rn with updates on patient's wishes.     TORB: Concepcion ROJAS/ Mary Horowitz RN:  - OK to not give patient her chemotherapy today , if that is what she prefers but Concepcion prefers that patient try to receive chemo today if possible.  - Orders placed for port placement.  Shanon to contact patient with details.    Patient verbalizes understanding and continues to want to hold chemo until port placed and until she speaks with   Edmund. Shanon FUNES RN also came to infusion to explain plan to the patient.     Intravenous Access:  Peripheral IV placed.      Treatment Conditions:  Lab Results   Component Value Date    HGB 10.6 08/16/2017     Lab Results   Component Value Date    WBC 6.2 08/16/2017      Lab Results   Component Value Date    ANEU 2.9 08/16/2017     Lab Results   Component Value Date     08/16/2017      Lab Results   Component Value Date     08/16/2017                   Lab Results   Component Value Date    POTASSIUM 4.2 08/16/2017           Lab Results   Component Value Date    MAG 2.1 08/16/2017            Lab Results   Component Value Date    CR 0.78 08/16/2017                   Lab Results   Component Value Date    COLE 8.9 08/16/2017                Lab Results   Component Value Date    BILITOTAL 0.4 08/16/2017           Lab Results   Component Value Date    ALBUMIN 3.5 08/16/2017                    Lab Results   Component Value Date    ALT 14 08/16/2017           Lab Results   Component Value Date    AST 12 08/16/2017     Results reviewed, labs MET treatment parameters, ok to proceed with treatment.          Post Infusion Assessment:  Patient tolerated infusion poorly due to Taxol reaction.  Access discontinued per protocol.    Discharge Plan:   Prescription refills given for Xarelto.  Discharge instructions reviewed with: Patient.  Patient and/or family verbalized understanding of discharge instructions and all questions answered.  Copy of AVS reviewed with patient and/or family.  Next appointment to be determined.  Patient discharged in stable condition accompanied by: self.  Departure Mode: Ambulatory.  Face to Face time: 30 minutes.    Mary Horowitz RN

## 2017-08-16 NOTE — PROGRESS NOTES
Visit Activities (Refresh list every visit): NEW, [facility] Only and Warm-handoff     [facility] met with Hafsa at health care team's request to assess her current behavioral health needs and provide appropriate intervention.  Hafsa had a friend with her today for support, who she invited to be present during our conversation.    Hafsa had an allergic reaction to her first chemo treatment today. The charge nurse had me paged to provide support for Hafsa after the situation was stabilized medically, given Hafsa's understandable distress, anxiety. We spent a little time processing her experience, her fears. Reviewed her resources and introduced myself and services, as well as that of the whole Behavioral and Spiritual Health Team.  Hafsa had calmed down significantly and was open to ongoing conversation at some point in the future. I did leave her my card and she stated she would reach out to me if she desired further conversation.    I affirmed the steps this patient has taken to address physical and behavioral health issues, and offered continued behavioral health services or referral, now or in the future, as needed by the patient.    I met with the patient less than 15 minutes today. No charge.    Lj Gallegos MA, LMFT  Lead Behavioral Health Clinician  [facility]    [email or web address]       Phone: [phone] (mobility extension)  Pager: [phone]

## 2017-08-16 NOTE — NURSING NOTE
Chief Complaint   Patient presents with     Blood Draw     Venipuncture in right hand, vitals taken      Elizabeth Kellogg CMA

## 2017-08-16 NOTE — NURSING NOTE
"Oncology Rooming Note    August 16, 2017 7:50 AM   Hafsa Corona is a 62 year old female who presents for:    Chief Complaint   Patient presents with     Blood Draw     Venipuncture in right hand, vitals taken      Initial Vitals: /76  Pulse 84  Temp 98.4  F (36.9  C) (Oral)  Resp 16  Ht 1.6 m (5' 2.99\")  Wt 82.4 kg (181 lb 9.6 oz)  SpO2 95%  BMI 32.18 kg/m2 Estimated body mass index is 32.18 kg/(m^2) as calculated from the following:    Height as of this encounter: 1.6 m (5' 2.99\").    Weight as of this encounter: 82.4 kg (181 lb 9.6 oz). Body surface area is 1.91 meters squared.  No Pain (1) Comment: Pelvic   No LMP recorded. Patient is postmenopausal.  Allergies reviewed: Yes  Medications reviewed: Yes    Medications: Medication refills not needed today.  Pharmacy name entered into [facility]:    [id number] - [place], [place] - [address] AT [facility] [address] [facility] - [place], [place] - [address]    Clinical concerns: None Concepcion Kohler was NOT notified.    7 minutes for nursing intake (face to face time)     Erika Mehta LPN              "

## 2017-08-16 NOTE — PROGRESS NOTES
Gynecologic Oncology Note  8/16/2017      RN Mary reported: After receiving 13 cc of Taxol, patient reported chest tightness, facial flushing, back pain.  Infusion stopped, rescue medications given, benadryl, solumedrol and one dose of epinephrine. Patient's symptoms were able to be control with rescue medications and iv fluids.     S: Patient reports she felt flush, had chest tightness and some back pain suddenly within a few minutes of taxol starting. Reports she is feeling much better now.     O:  Vitals:    08/16/17 1311   BP: 102/56   Pulse: 90   Resp: 18   SpO2: 98%       Gen: Alert and oriented  Cardio: HR regular  Resp: LS clear  Abdomen: BS + abd soft  Extremities: No edema    A/P: 62 year old with stage IC Ovarian cancer. Patient noted to have a taxol reaction at cycle #1. Tolerated carbo without difficulty. Patient recovered from taxol reaction. Per RN 'Prior to restarting patient's Taxol, patient's peripheral iv was extremely painful to the touch but positive blood return obtained.  Per patient request, iv removed and no new iv placed.  Patient voiced her concerns about restarting Taxol and wanted to speak with Dr. Shaffer prior to receiving any more chemotherapy.  Patient also voiced her desire to have a port placed.' Patient refusing taxol today. I met with patient to discussed taxol reactions and how they are treated. Patient declines chemo today and wishes to follow up with Dr Shaffer. Shanon RN care coordinator made aware and to arrange follow up for patient.    Concepcion Kohler CNP  8/16/2017 5:10 PM

## 2017-08-16 NOTE — MR AVS SNAPSHOT
After Visit Summary   8/16/2017    Hafsa Corona    MRN: [id number]           Patient Information     Date Of Birth          1955        Visit Information        Provider Department      8/16/2017 12:30 PM [facility]        Today's Diagnoses     Adjustment reaction    -  1       Follow-ups after your visit        Your next 10 appointments already scheduled     Sep 01, 2017  2:15 PM CDT   (Arrive by 2:00 PM)   NEW WITH ROOM with Mariam Sams GC,  2 114 CONSULT [facility] ([facility])    [address] [place] [address]  2nd [facility] [address]   [phone]            Sep 12, 2017  3:00 PM CDT   (Arrive by 2:45 PM)   NEW WITH ROOM with Lola Vasquez, PhD ,  2 114 CONSULT [facility] ([facility])    [address] [place] [address]   [phone]            Sep 15, 2017  8:30 AM CDT   [facility] Draw with  [facility] LAB DRAW   [facility] Draw ([facility])    [address] [facility] [facility]  2nd [facility] [address]   [phone]            Sep 15, 2017  9:00 AM CDT   (Arrive by 8:45 AM)   Return Active Treatment with MARLON [facility] ([facility])    [address]  2nd [facility] [address]   [phone]            Sep 15, 2017 10:00 AM CDT   Infusion 360 with  ONCOLOGY INFUSION, UC 12 ATC   [facility] ([facility])    [address] [facility] [facility]  2nd [facility] [address]   [phone]            Sep 21, 2017 11:00 AM CDT   New Visit with Lucina Reyes [facility] ([facility])    [address] [place] [id number] [phone]            Oct 06, 2017  8:30 AM CDT   [facility] Lab Draw with  [facility]  LAB DRAW   [facility] Lab Draw ([facility])    [address] [facility]  2nd [facility] [address] [phone]              Who to contact     Please call your clinic at [phone] to:    Ask questions about your health    Make or cancel appointments    Discuss your medicines    Learn about your test results    Speak to your doctor   If you have compliments or concerns about an experience at your clinic, or if you wish to file a complaint, please contact [facility] Patient Relations at [phone] or email us at [email or web address]         Additional Information About Your Visit        uberlifeharSentiment Information     uberlifehart is an electronic gateway that provides easy, online access to your medical records. With [email or web address], you can request a clinic appointment, read your test results, renew a prescription or communicate with your care team.     To sign up for [employer] visit the website at [email or web address]   You will be asked to enter the access code listed below, as well as some personal information. Please follow the directions to create your username and password.     Your access code is: [email or web address]  Expires: 10/10/2017 11:16 AM     Your access code will  in 90 days. If you need help or a new code, please contact your [facility] or call [phone] for assistance.        Care EveryWhere ID     This is your Care EveryWhere ID. This could be used by other organizations to access your [facility] medical records  [id number]         Blood Pressure from Last 3 Encounters:   17 120/75   17 111/77   17 122/81    Weight from Last 3 Encounters:   17 82.8 kg (182 lb 8 oz)   17 81.3 kg (179 lb 4.8 oz)   17 82.1 kg (181 lb)              Today, you had the following     No orders found for display         Today's Medication Changes          These changes are accurate as  of: 8/16/17 11:59 PM.  If you have any questions, ask your nurse or doctor.               Start taking these medicines.        Dose/Directions    LORazepam 1 MG tablet   Commonly known as:  ATIVAN   Used for:  Ovarian cancer, left (H), Encounter for long-term (current) use of medications        Dose:  1 mg   Take 1 tablet (1 mg) by mouth every 6 hours as needed (nausea/vomiting, anxiety or sleep)   Quantity:  30 tablet   Refills:  1       prochlorperazine 10 MG tablet   Commonly known as:  COMPAZINE   Used for:  Ovarian cancer, left (H), Encounter for long-term (current) use of medications        Dose:  10 mg   Take 1 tablet (10 mg) by mouth every 6 hours as needed (nausea/vomiting)   Quantity:  30 tablet   Refills:  2         These medicines have changed or have updated prescriptions.        Dose/Directions    metFORMIN 500 MG 24 hr tablet   Commonly known as:  GLUCOPHAGE-XR   This may have changed:    - how much to take  - when to take this  - additional instructions   Used for:  Type 2 diabetes mellitus without complication, without long-term current use of insulin (H)        Take two tabs by mouth once daily with evening meal.   Quantity:  180 tablet   Refills:  PRN            Where to get your medicines      These medications were sent to [address] [address]    Hours:  TRANSPLANT PHONE NUMBER [phone] Phone:  [phone]     prochlorperazine 10 MG tablet         Some of these will need a paper prescription and others can be bought over the counter.  Ask your nurse if you have questions.     Bring a paper prescription for each of these medications     LORazepam 1 MG tablet                Primary Care Provider Office Phone # Fax #    Morelia Ayon [phone] [address] [facility] [facility] [address]        Equal Access to Services     RONEY ARANDA AH: Ward tellez  Haylee, wagabrielleda luqadaha, qaybta kaalmada néstor, sujatha fergusonallan idalia. So [facility] [phone].    ATENCIÓN: Si melvin cristina, tiene a martínez disposición servicios gratuitos de asistencia lingüística. Elizabeth al [phone].    We comply with applicable federal civil rights laws and [place] laws. We do not discriminate on the basis of race, color, national origin, age, disability sex, sexual orientation or gender identity.            Thank you!     Thank you for choosing [facility]  for your care. Our goal is always to provide you with excellent care. Hearing back from our patients is one way we can continue to improve our services. Please take a few minutes to complete the written survey that you may receive in the mail after your visit with us. Thank you!             Your Updated Medication List - Protect others around you: Learn how to safely use, store and throw away your medicines at www.disposemymeds.org.          This list is accurate as of: 8/16/17 11:59 PM.  Always use your most recent med list.                   Brand Name Dispense Instructions for use Diagnosis    acetaminophen 325 MG tablet    TYLENOL    100 tablet    Take 2 tablets (650 mg) by mouth every 6 hours as needed for mild pain    Cellulitis and abscess of leg, Mass of pelvis       albuterol 108 (90 BASE) MCG/ACT Inhaler    PROAIR HFA    1 Inhaler    Inhale 2 puffs into the lungs 4 times daily as needed for shortness of breath / dyspnea    Cough       atorvastatin 80 MG tablet    LIPITOR    90 tablet    Take 1 tablet (80 mg) by mouth daily    Hyperlipidemia LDL goal <100       CALCIUM-MAGNESIUM-VITAMIN D PO      Take 2 tablets by mouth daily        EPINEPHrine 0.3 MG/0.3ML injection 2-pack    EPIPEN/ADRENACLICK/or ANY BX GENERIC EQUIV    1 each    Inject 0.3 mLs (0.3 mg) into the muscle once as needed for anaphylaxis    Bee allergy status       escitalopram 20 MG tablet    LEXAPRO    30 tablet    Take 1  "tablet (20 mg) by mouth daily    Major depressive disorder, recurrent episode, mild (H)       fluticasone 50 MCG/ACT spray    FLONASE    48 g    Spray 2 sprays into both nostrils daily    Chronic maxillary sinusitis       lisinopril 10 MG tablet    PRINIVIL/ZESTRIL    90 tablet    Take 1 tablet (10 mg) by mouth daily    Essential hypertension       LORazepam 1 MG tablet    ATIVAN    30 tablet    Take 1 tablet (1 mg) by mouth every 6 hours as needed (nausea/vomiting, anxiety or sleep)    Ovarian cancer, left (H), Encounter for long-term (current) use of medications       metFORMIN 500 MG 24 hr tablet    GLUCOPHAGE-XR    180 tablet    Take two tabs by mouth once daily with evening meal.    Type 2 diabetes mellitus without complication, without long-term current use of insulin (H)       MULTIVITAMIN ADULT PO      Take 1 tablet by mouth daily        order for DME     1 Units    Equipment being ordered: blood pressure monitor    Essential hypertension       * order for DME     1 each    Compression stockings 20-30 mm Hg. To be wear when directed by Hematology team and while awake for the first two years post clot.    Long-term (current) use of anticoagulants, Acute deep vein thrombosis (DVT) of left lower extremity, unspecified vein (H)       * order for DME     1 Bottle    Plain packing strip 1/4\"    Abscess of leg       * order for DME     1 Box    Sterile zoey tipped applicators    Abscess of leg       polyethylene glycol Packet    MIRALAX/GLYCOLAX    7 packet    Take 17 g by mouth daily    Mass of pelvis       PRO-BIOTIC BLEND PO      Take 1 capsule by mouth daily Enzymatic Therapy-probiotic pearls        prochlorperazine 10 MG tablet    COMPAZINE    30 tablet    Take 1 tablet (10 mg) by mouth every 6 hours as needed (nausea/vomiting)    Ovarian cancer, left (H), Encounter for long-term (current) use of medications       rivaroxaban ANTICOAGULANT 20 MG Tabs tablet    XARELTO    30 tablet    Take 1 tablet (20 mg) by " mouth daily (with dinner)    Long-term (current) use of anticoagulants, Acute deep vein thrombosis (DVT) of left lower extremity, unspecified vein (H)       senna-docusate 8.6-50 MG per tablet    SENOKOT-S;PERICOLACE    100 tablet    Take 2 tablets by mouth 2 times daily Start with 1 tablet PO BID, If no bowel movement in 24 hours, increase to 2 tablets PO BID.  Hold for loose stools.    Mass of pelvis       traZODone 50 MG tablet    DESYREL    180 tablet    TAKE 1 TO 2 TABLETS(50  MG) BY MOUTH EVERY NIGHT AS NEEDED FOR SLEEP    Insomnia, unspecified type       vitamin B complex with vitamin C Tabs tablet      Take 1 tablet by mouth daily        vitamin D 1000 UNITS capsule      Take 2,000 Units by mouth daily        VYVANSE PO      Take 50 mg by mouth daily        * Notice:  This list has 3 medication(s) that are the same as other medications prescribed for you. Read the directions carefully, and ask your doctor or other care provider to review them with you.

## 2017-08-16 NOTE — PROGRESS NOTES
Care Coordinator Note  Reviewed today's lab results and copy given.  Patient to have lab draw in two weeks per hematologist.  Patient anxious to hear CA-125 result today.  Reviewed antiemetics and discussed how to take.  Next appointments discussed and calendar given to patient.  Encouraged to call if questions.  She plans to have wig made and has appointment scheduled.      Patient verbalized back understanding of the above information discussed.   Face to face time spent with patient;  10 minutes    Shanon SMITH, RN  Care Coordinator  Gynecologic Cancer   Office:  [phone]  Pager: [phone] #[phone]

## 2017-08-16 NOTE — MR AVS SNAPSHOT
"              After Visit Summary   8/16/2017    Hafsa Corona    MRN: [id number]           Patient Information     Date Of Birth          1955        Visit Information        Provider Department      8/16/2017 8:00 AM Concepcion Kohler APRN [facility]        Today's Diagnoses     Personal history of ovarian cancer    -  1    Ovarian cancer, left (H)           Follow-ups after your visit        Your next 10 appointments already scheduled     Aug 21, 2017  3:00 PM CDT   (Arrive by 2:45 PM)   MA SCREENING DIGITAL BILATERAL with [id number]   [facility] ([facility])    [address]  2nd Floor  [facility] [address] [phone]           Do not use any powder, lotion or deodorant under your arms or on your breast. If you do, we will ask you to remove it before your exam.  Wear comfortable, two-piece clothing.  If you have any allergies, tell your care team.  Bring any previous mammograms from other facilities or have them mailed to the breast center. Three-dimensional (3D) mammograms are available at [place] locations in [facility], and [place]. [facility] locations include [place] and [facility] in [place]. Benefits of 3D mammograms include: - Improved rate of cancer detection - Decreases your chance of having to go back for more tests, which means fewer: - \"False-positive\" results (This means that there is an abnormal area but it isn't cancer.) - Invasive testing procedures, such as a biopsy or surgery - Can provide clearer images of the breast if you have dense breast tissue. 3D mammography is an optional exam that anyone can have with a 2D mammogram. It doesn't replace or take the place of a 2D mammogram. 2D mammograms remain an effective screening test for all women.  Not all insurance companies cover the cost of a 3D mammogram. Check with your insurance.            Aug " 30, 2017  8:15 AM CDT   [facility] Draw with  [facility] DRAW   [facility] ([facility])    [address] [place] [address]   [phone]            Sep 01, 2017  2:15 PM CDT   (Arrive by 2:00 PM)   NEW WITH ROOM with Mariam Sams GC,  2 114 CONSULT [facility] ([facility])    [address] [place] [address]   [phone]            Sep 05, 2017  8:30 AM CDT   [facility] Draw with  [facility] DRAW   [facility] ([facility])    [address] [place] [address]   [phone]            Sep 05, 2017  9:00 AM CDT   (Arrive by 8:45 AM)   Return Active Treatment with MARLON Limon CNP   [facility] ([facility])    [address] [place] [address]   [phone]            Sep 06, 2017  9:30 AM CDT   Infusion 360 with  ONCOLOGY INFUSION,  17 ATC   [facility] ([facility])    [address] [place] [address]   [phone]            Sep 12, 2017  3:00 PM CDT   (Arrive by 2:45 PM)   NEW WITH ROOM with Lola Vasquez,  ,  2 114 CONSULT [facility] ([facility])    [address] [place] [address]   [phone]              Future tests that were ordered for you today     Open Standing Orders        Priority Remaining Interval Expires Ordered    Notify Physician Routine [id number] PRN  8/16/2017          Open Future Orders        Priority Expected Expires Ordered    IR Chest Port Placement > 5 Yrs of Age Routine 8/21/2017 8/17/2018 8/16/2017    CBC with platelets differential Routine 8/30/2017 8/16/2018 8/16/2017    CMP - Comprehensive Metabolic Panel Routine 8/30/2017 8/16/2018  "2017    Magnesium Routine 2017            Who to contact     If you have questions or need follow up information about today's clinic visit or your schedule please contact [facility] directly at [phone].  Normal or non-critical lab and imaging results will be communicated to you by MyChart, letter or phone within 4 business days after the clinic has received the results. If you do not hear from us within 7 days, please contact the clinic through MyChart or phone. If you have a critical or abnormal lab result, we will notify you by phone as soon as possible.  Submit refill requests through [employer] or call your pharmacy and they will forward the refill request to us. Please allow 3 business days for your refill to be completed.          Additional Information About Your Visit        MyChart Information     [employer] lets you send messages to your doctor, view your test results, renew your prescriptions, schedule appointments and more. To sign up, go to [email or web address] . Click on \"Log in\" on the left side of the screen, which will take you to the Welcome page. Then click on \"Sign up Now\" on the right side of the page.     You will be asked to enter the access code listed below, as well as some personal information. Please follow the directions to create your username and password.     Your access code is: [email or web address]  Expires: 10/10/2017 11:16 AM     Your access code will  in 90 days. If you need help or a new code, please call your [place] clinic or [phone].        Care EveryWhere ID     This is your Care EveryWhere ID. This could be used by other organizations to access your [place] medical records  [id number]        Your Vitals Were     Pulse Temperature Respirations Height Pulse Oximetry BMI (Body Mass Index)    84 98.4  F (36.9  C) (Oral) 16 1.6 m (5' 2.99\") 95% 32.18 kg/m2       Blood Pressure from Last 3 Encounters:   17 102/56 "   08/16/17 109/76   08/16/17 119/70    Weight from Last 3 Encounters:   08/16/17 82.4 kg (181 lb 9.6 oz)   08/11/17 82.3 kg (181 lb 8 oz)   08/02/17 83.9 kg (185 lb)                 Today's Medication Changes          These changes are accurate as of: 8/16/17  5:08 PM.  If you have any questions, ask your nurse or doctor.               Start taking these medicines.        Dose/Directions    LORazepam 1 MG tablet   Commonly known as:  ATIVAN   Used for:  Ovarian cancer, left (H), Encounter for long-term (current) use of medications        Dose:  1 mg   Take 1 tablet (1 mg) by mouth every 6 hours as needed (nausea/vomiting, anxiety or sleep)   Quantity:  30 tablet   Refills:  1       prochlorperazine 10 MG tablet   Commonly known as:  COMPAZINE   Used for:  Ovarian cancer, left (H), Encounter for long-term (current) use of medications        Dose:  10 mg   Take 1 tablet (10 mg) by mouth every 6 hours as needed (nausea/vomiting)   Quantity:  30 tablet   Refills:  2         These medicines have changed or have updated prescriptions.        Dose/Directions    metFORMIN 500 MG 24 hr tablet   Commonly known as:  GLUCOPHAGE-XR   This may have changed:    - how much to take  - when to take this  - additional instructions   Used for:  Type 2 diabetes mellitus without complication, without long-term current use of insulin (H)        Take two tabs by mouth once daily with evening meal.   Quantity:  180 tablet   Refills:  PRN            Where to get your medicines      These medications were sent to [place], [place] - [address] [place] [address] [address] [address]    Hours:  TRANSPLANT PHONE NUMBER [phone] Phone:  [phone]     prochlorperazine 10 MG tablet         Some of these will need a paper prescription and others can be bought over the counter.  Ask your nurse if you have questions.     Bring a paper prescription for each of these medications      LORazepam 1 MG tablet                Primary Care Provider Office Phone # Fax #    Morelia Ayon, [phone] [address] [place]  [facility] [address]        Equal Access to Services     RONEY ARANDA : Ward joyner lindao Sorejiali, waaxda luqadaha, qaybta kaalmada adeegyada, waxbrent simmonsn erika bragg alpesh friedman. So [facility] [phone].    ATENCIÓN: Si habla español, tiene a martínez disposición servicios gratuitos de asistencia lingüística. Llame al [phone].    We comply with applicable federal civil rights laws and [place] laws. We do not discriminate on the basis of race, color, national origin, age, disability sex, sexual orientation or gender identity.            Thank you!     Thank you for choosing [facility]  for your care. Our goal is always to provide you with excellent care. Hearing back from our patients is one way we can continue to improve our services. Please take a few minutes to complete the written survey that you may receive in the mail after your visit with us. Thank you!             Your Updated Medication List - Protect others around you: Learn how to safely use, store and throw away your medicines at www.disposemymeds.org.          This list is accurate as of: 8/16/17  5:08 PM.  Always use your most recent med list.                   Brand Name Dispense Instructions for use Diagnosis    acetaminophen 325 MG tablet    TYLENOL    100 tablet    Take 2 tablets (650 mg) by mouth every 6 hours as needed for mild pain    Cellulitis and abscess of leg, Mass of pelvis       albuterol 108 (90 BASE) MCG/ACT Inhaler    albuterol    1 Inhaler    Inhale 2 puffs into the lungs 4 times daily as needed for shortness of breath / dyspnea    Cough       atorvastatin 80 MG tablet    LIPITOR    90 tablet    Take 1 tablet (80 mg) by mouth daily    Hyperlipidemia LDL goal <100       CALCIUM-MAGNESIUM-VITAMIN D PO      Take 2 tablets by mouth daily        EPINEPHrine  "0.3 MG/0.3ML injection 2-pack    EPIPEN/ADRENACLICK/or ANY BX GENERIC EQUIV    1 each    Inject 0.3 mLs (0.3 mg) into the muscle once as needed for anaphylaxis    Bee allergy status       escitalopram 20 MG tablet    LEXAPRO    30 tablet    Take 1 tablet (20 mg) by mouth daily    Major depressive disorder, recurrent episode, mild (H)       fluticasone 50 MCG/ACT spray    FLONASE    48 g    Spray 2 sprays into both nostrils daily    Chronic maxillary sinusitis       lisinopril 10 MG tablet    PRINIVIL/ZESTRIL    90 tablet    Take 1 tablet (10 mg) by mouth daily    Essential hypertension       LORazepam 1 MG tablet    ATIVAN    30 tablet    Take 1 tablet (1 mg) by mouth every 6 hours as needed (nausea/vomiting, anxiety or sleep)    Ovarian cancer, left (H), Encounter for long-term (current) use of medications       metFORMIN 500 MG 24 hr tablet    GLUCOPHAGE-XR    180 tablet    Take two tabs by mouth once daily with evening meal.    Type 2 diabetes mellitus without complication, without long-term current use of insulin (H)       MULTIVITAMIN ADULT PO      Take 1 tablet by mouth daily        order for DME     1 Units    Equipment being ordered: blood pressure monitor    Essential hypertension       * order for DME     1 each    Compression stockings 20-30 mm Hg. To be wear when directed by Hematology team and while awake for the first two years post clot.    Long-term (current) use of anticoagulants, Acute deep vein thrombosis (DVT) of left lower extremity, unspecified vein (H)       * order for DME     1 Bottle    Plain packing strip 1/4\"    Abscess of leg       * order for DME     1 Box    Sterile zoey tipped applicators    Abscess of leg       polyethylene glycol Packet    MIRALAX/GLYCOLAX    7 packet    Take 17 g by mouth daily    Mass of pelvis       PRO-BIOTIC BLEND PO      Take 1 capsule by mouth daily Enzymatic Therapy-probiotic pearls        prochlorperazine 10 MG tablet    COMPAZINE    30 tablet    Take 1 " tablet (10 mg) by mouth every 6 hours as needed (nausea/vomiting)    Ovarian cancer, left (H), Encounter for long-term (current) use of medications       rivaroxaban ANTICOAGULANT 20 MG Tabs tablet    XARELTO    30 tablet    Take 1 tablet (20 mg) by mouth daily (with dinner)    Long-term (current) use of anticoagulants, Acute deep vein thrombosis (DVT) of left lower extremity, unspecified vein (H)       senna-docusate 8.6-50 MG per tablet    SENOKOT-S;PERICOLACE    100 tablet    Take 2 tablets by mouth 2 times daily Start with 1 tablet PO BID, If no bowel movement in 24 hours, increase to 2 tablets PO BID.  Hold for loose stools.    Mass of pelvis       traZODone 50 MG tablet    DESYREL    180 tablet    TAKE 1 TO 2 TABLETS(50  MG) BY MOUTH EVERY NIGHT AS NEEDED FOR SLEEP    Insomnia, unspecified type       vitamin B complex with vitamin C Tabs tablet      Take 1 tablet by mouth daily        vitamin D 1000 UNITS capsule      Take 2,000 Units by mouth daily        VYVANSE PO      Take 50 mg by mouth daily        * Notice:  This list has 3 medication(s) that are the same as other medications prescribed for you. Read the directions carefully, and ask your doctor or other care provider to review them with you.

## 2017-08-16 NOTE — LETTER
2017       RE: Hafsa Corona  [address]  [place] [address]     Dear Colleague,    Thank you for referring your patient, Hafsa Corona, to the [facility]. Please see a copy of my visit note below.                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 She is here today for disease management prior to cycle #1 carbo/taxol chemotherapy.     Patient reports she is slowly recovering from surgery. She was having a decreased appetite but that is slowly improving. She notes difficulty with sleeping but was started on trazadone by her PCP and feels like it is helping. Chronic issues with visual difficulty of the left eye d/t cataract. Chronic mild back pain. Notes anxiety, nervousness, and feeling angry. Denies suicidal or homicidal ideation. She notes she has a good support system and is following up with clinical psychology. She talks about her two dogs Sparky and Annalise and one cat Selena.She is following with hematology regarding previous DVT and is taking xarelto. No other complaints.     Treatment History:  The patient has had a recent episode of lower abdominal pain in early July.  She was subsequently sent to the emergency room and was found to have a large 9 cm complex ovarian mass. She was admitted to the hospital for pain control.  17: Exploratory laparotomy, total abdominal hysterectomy, left salpingo-oophorectomy, cancer staging including infracolic omentectomy, bilateral pelvic & para-aortic lymphadenectomy, peritoneal biopsies, evacuation of pelvic fluid by Dr. Cole.    FINAL DIAGNOSIS:   A. LEFT OVARY AND FALLOPIAN TUBE, LEFT SALPINGO-OOPHORECTOMY:   - Ovarian clear cell carcinoma   - Background of endometriosis   - Fallopian tube with no significant histologic abnormality.   B. UTERUS,  HYSTERECTOMY:   -  Inactive endometrium   -  Myometrium with adenomyosis and leiomyoma.   -  Cervix with squamous metaplasia.   C. PERITONEUM, RIGHT PELVIS, BIOPSY:   - Fibroadipose tissue, negative for malignancy.   D. LYMPH NODES, RIGHT PELVIC, DISSECTION:   - Thirteen benign lymph nodes (0/13).   E. LYMPH NODES, LEFT PELVIC, DISSECTION:   - Seven benign lymph nodes (0/7).   F. PERITONEUM, LEFT PELVIS, BIOPSY:   - Fibroadipose tissue, negative for malignancy.   G. PERITONEUM, BLADDER, BIOPSY:   - Fibroadipose tissue  with acute and chronic inflammation, negative for malignancy.   H. PERITONEUM, POSTERIOR CUL-DE-SAC, BIOPSY:   - Fibroadipose tissue, negative for malignancy.   I. PERITONEUM, LEFT PARACOLIC GUTTER, BIOPSY:   - Fibroadipose tissue, negative for malignancy.   J. LYMPH NODES, LEFT PARA-AORTIC, DISSECTION:   - Five benign lymph nodes (0/5).   K. LYMPH NODES, RIGHT PARA-AORTIC, DISSECTION:   - Two benign lymph nodes (0/2).   L. PERITONEUM, RIGHT PARACOLIC GUTTER, BIOPSY:   - Fibroadipose tissue, negative for malignancy.   M. OMENTUM, OMENTECTOMY:   - Adipose tissue with reactive changes, negative for malignancy.   COMMENT:   The final diagnosis confirms the interpretation provided intraoperatively.   Report Name: Ovary or Fallopian Tube   Status: Submitted   Part(s) Involved:   A: Ovary and fallopian tube, left   Synoptic Report:   CLINICAL   Clinical History:   - Pelvic mass   SPECIMEN   Procedure:   - Left salpingo-oophorectomy   - Hysterectomy   - Omentectomy   - Peritoneal  biopsies   Lymph Node Sampling:   - Performed   Location:   - Pelvic lymph nodes   - Para-aortic lymph nodes   Specimen Integrity:   - Left ovary   Specimen Integrity of Left Ovary:   - Capsule intact   TUMOR   Primary Tumor Site(s):   - Left ovary   Left Ovary   Tumor Size of Left Ovary: 19 cm   Histologic Type:   - Clear cell carcinoma   Tumor Extent   Ovarian Surface Involvement:   - Absent   Specimen(s)   Extent of Left Ovary:    - Involved   Extent of Left Fallopian Tube:   - Not involved   Extent of Omentum:   - Not involved   Extent of Uterus:   - Not involved   Extent of Peritoneum:   - Not involved   Peritoneal Ascitic Fluid:   - Not performed / unknown   Pleural Fluid:   - Not performed / unknown   LYMPH NODES     Number of Pelvic Lymph Nodes Examined: 20     Number of Pelvic Lymph Nodes Involved: None identified     Number of Para-aortic Lymph Nodes Examined: 7     Number of Para-Aortic Lymph Nodes Involved: None identified   STAGE (PTNM, AJCC 7TH ED.)   Primary Tumor (pT):   - Ovary   Ovarian Primary Tumor (pT):   - pT1a: Tumor limited to 1 ovary; capsule intact, no tumor on ovarian surface. No malignant cells in ascites or peritoneal washings#   Regional Lymph Nodes (pN):   - pN0: No regional lymph node metastasis     07/31/17: Dr Shaffer follow up: Discussed with the patient that given the high risk histology, as well as ruptured mass before surgery, she would be qualified at higher risk of recurrence despite early stage ovarian cancer.   Recommended adjuvant chemotherapy. Plan for carboplatin of AUC of 6 and paclitaxel of 175, m2 for 3 cycles. Referral for genetic counselor and will see her back after those 3 cycles  08/03/17: Call from patient stating she is going for a second opinion and would like chemotherapy rescheduled to week of 8/14/17.    08/16/17: Cycle #1 Carbo/Taxol.  Pending    Review of Systems:    Systemic           no weight changes; no fever; no chills; no night sweats; + slight decrease in appetite is improving  Skin           no rashes, or lesions  Eye           no irritation; no changes in vision  Nhan-Laryngeal           no dysphagia; no hoarseness   Pulmonary    no cough; no shortness of breath  Cardiovascular    no chest pain; no palpitations  Gastrointestinal    no diarrhea; no constipation; no abdominal pain; no changes in bowel  habits; no blood in stool  Genitourinary   no urinary frequency; no  urinary urgency; no dysuria; no pain; no abnormal vaginal discharge; no abnormal vaginal bleeding  Musculoskeletal    no myalgias; no arthralgias; + back pain  Psychiatric           no depressed mood; + anxiety + nervousness + feelings of anger    Hematologic           no tender lymph nodes; no noticeable swellings or lumps   Endocrine    no hot flashes; no heat/cold intolerance         Neurological   no tremor; no numbness and tingling; no headaches; + difficulty  sleeping      Past Medical History:    Past Medical History:   Diagnosis Date     Anxiety state, unspecified     [id number]     Attention deficit disorder without mention of hyperactivity      Chronic rhinitis      Depressive disorder, not elsewhere classified      Panic disorder without agoraphobia      Pure hypercholesterolemia     Lipitor     Tachycardia, unspecified     9/1999-2/2004     Type II or unspecified type diabetes mellitus without mention of complication, not stated as uncontrolled     diagnosed 2004         Past Surgical History:    Past Surgical History:   Procedure Laterality Date     HYSTERECTOMY TOTAL ABDOMINAL, BILATERAL SALPINGO-OOPHORECTOMY, NODE DISSECTION, COMBINED Left 7/7/2017    Procedure: COMBINED HYSTERECTOMY TOTAL ABDOMINAL, SALPINGO-OOPHORECTOMY, NODE DISSECTION;  Exploratory Laparotomy, Left Salpingo-Oophorectomy, Cancer Staging, Total Hysterectomy, Omentectomy, Evacuation of abdominal fluid, Lymph Node Dissection  Anesthesia Block ;  Surgeon: Serena Cole MD;  Location: [facility] OR     HYSTERECTOMY, PAP NO LONGER INDICATED  07/07/2017    Laparotomy; for ovarian cancer staging     LYMPHADENECTOMY RETROPERITONEAL Bilateral 07/07/2017    Laparotomy; pelvics & para-aortics; for ovarian cancer staging     OMENTECTOMY  07/07/2017    Laparotomy; for ovarian cancer staging     SALPINGO OOPHORECTOMY,R/L/KAYY Right 2008    Salpingo Oophorectomy, RT     SALPINGO OOPHORECTOMY,R/L/KAYY Left 07/07/2017    Laparotomy; for ovarian cancer  "staging     SURGICAL HISTORY OF -       facial surgery d/t fall in 1/2002     SURGICAL HISTORY OF -       1985 removal of breast cyst     SURGICAL HISTORY OF -   2008    endometrial ablation         Health Maintenance Due   Topic Date Due     ADVANCE DIRECTIVE PLANNING Q5 YRS  03/09/2009     MAMMO SCREEN Q2 YR (SYSTEM ASSIGNED)  12/01/2016     EYE EXAM Q1 YEAR  07/01/2017     LIPID MONITORING Q1 YEAR  09/06/2017     MICROALBUMIN Q1 YEAR  09/06/2017       Current Medications:     Current Outpatient Prescriptions   Medication Sig Dispense Refill     rivaroxaban ANTICOAGULANT (XARELTO) 20 MG TABS tablet Take 1 tablet (20 mg) by mouth daily (with dinner) 30 tablet 11     traZODone (DESYREL) 50 MG tablet TAKE 1 TO 2 TABLETS(50  MG) BY MOUTH EVERY NIGHT AS NEEDED FOR SLEEP 180 tablet 1     order for DME Plain packing strip 1/4\" 1 Bottle PRN     order for DME Sterile zoey tipped applicators 1 Box PRN     Multiple Vitamins-Minerals (MULTIVITAMIN ADULT PO) Take 1 tablet by mouth daily       vitamin B complex with vitamin C (VITAMIN  B COMPLEX) TABS tablet Take 1 tablet by mouth daily       order for DME Compression stockings 20-30 mm Hg. To be wear when directed by Hematology team and while awake for the first two years post clot. 1 each 0     CALCIUM-MAGNESIUM-VITAMIN D PO Take 2 tablets by mouth daily       Probiotic Product (PRO-BIOTIC BLEND PO) Take 1 capsule by mouth daily Enzymatic Therapy-probiotic pearls       atorvastatin (LIPITOR) 80 MG tablet Take 1 tablet (80 mg) by mouth daily 90 tablet PRN     order for DME Equipment being ordered: blood pressure monitor 1 Units 0     lisinopril (PRINIVIL/ZESTRIL) 10 MG tablet Take 1 tablet (10 mg) by mouth daily 90 tablet PRN     metFORMIN (GLUCOPHAGE-XR) 500 MG 24 hr tablet Take two tabs by mouth once daily with evening meal. (Patient taking differently: 1,000 mg daily (with dinner) Take two tabs by mouth once daily with evening meal.) 180 tablet PRN     escitalopram " (LEXAPRO) 20 MG tablet Take 1 tablet (20 mg) by mouth daily 30 tablet PRN     fluticasone (FLONASE) 50 MCG/ACT nasal spray Spray 2 sprays into both nostrils daily 48 g 1     Cholecalciferol (VITAMIN D) 1000 UNIT capsule Take 2,000 Units by mouth daily        LORazepam (ATIVAN) 1 MG tablet Take 1 tablet (1 mg) by mouth every 6 hours as needed (nausea/vomiting, anxiety or sleep) (Patient not taking: Reported on 8/16/2017) 30 tablet 1     prochlorperazine (COMPAZINE) 10 MG tablet Take 1 tablet (10 mg) by mouth every 6 hours as needed (nausea/vomiting) (Patient not taking: Reported on 8/16/2017) 30 tablet 2     acetaminophen (TYLENOL) 325 MG tablet Take 2 tablets (650 mg) by mouth every 6 hours as needed for mild pain (Patient not taking: Reported on 8/16/2017) 100 tablet 0     polyethylene glycol (MIRALAX/GLYCOLAX) Packet Take 17 g by mouth daily (Patient not taking: Reported on 7/31/2017) 7 packet 0     senna-docusate (SENOKOT-S;PERICOLACE) 8.6-50 MG per tablet Take 2 tablets by mouth 2 times daily Start with 1 tablet PO BID, If no bowel movement in 24 hours, increase to 2 tablets PO BID.  Hold for loose stools. (Patient not taking: Reported on 7/31/2017) 100 tablet 0     Lisdexamfetamine Dimesylate (VYVANSE PO) Take 50 mg by mouth daily       EPINEPHrine (EPIPEN) 0.3 MG/0.3ML injection Inject 0.3 mLs (0.3 mg) into the muscle once as needed for anaphylaxis (Patient not taking: Reported on 7/31/2017) 1 each PRN     albuterol (ALBUTEROL) 108 (90 BASE) MCG/ACT inhaler Inhale 2 puffs into the lungs 4 times daily as needed for shortness of breath / dyspnea (Patient not taking: Reported on 7/31/2017) 1 Inhaler PRN         Allergies:        Allergies   Allergen Reactions     Sulfa Drugs      Wasps [Hornets]         Social History:     Social History   Substance Use Topics     Smoking status: Never Smoker     Smokeless tobacco: Never Used     Alcohol use Yes      Comment: rarely       History   Drug Use No         Family  "History:     The patient's family history is notable for     Family History   Problem Relation Age of Onset     C.A.D. Father      DIABETES Father      Hypertension Father      CEREBROVASCULAR DISEASE Father      Psychotic Disorder Father      Pancreatic Cancer Father      C.A.D. Mother      Hypertension Mother      Breast Cancer Mother      Psychotic Disorder Mother      Colon Cancer Mother      CANCER Mother      Bone cancer     Prostate Problems Brother      cleared      Psychotic Disorder Sister      x2     Neurologic Disorder Sister      Psychotic Disorder Brother      x2     Depression Brother      major depressive disorder and OCD     Anxiety Disorder Brother      MENTAL ILLNESS Brother      Psychotic Disorder Maternal Grandmother      ?     Psychotic Disorder Maternal Grandfather      ?     Psychotic Disorder Paternal Grandmother      Schizophernia     Psychotic Disorder Paternal Grandfather      ?     Respiratory Paternal Grandfather       of emphysema and smoking     Psychotic Disorder Sister      Other - See Comments Sister      small kidney stone      Cancer - colorectal No family hx of          Physical Exam:     /76  Pulse 84  Temp 98.4  F (36.9  C) (Oral)  Resp 16  Ht 1.6 m (5' 2.99\")  Wt 82.4 kg (181 lb 9.6 oz)  SpO2 95%  BMI 32.18 kg/m2  Body mass index is 32.18 kg/(m^2).    General Appearance: healthy and alert, no distress     HEENT:  no palpable nodules or masses        Cardiovascular: regular rate and rhythm, no gallops, rubs or murmurs     Respiratory: lungs clear, no rales, rhonchi or wheezes    Musculoskeletal: extremities non tender and without edema    Skin: no lesions or rashes     Neurological: normal gait, no gross defects     Psychiatric: appropriate mood and affect              Gastrointestinal:       abdomen soft, non-tender, non-distended, no organomegaly or masses    Assessment:    Hafsa Corona is a 62 year old woman with a diagnosis of stage IC2 clear cell " carcinoma of the ovary.       Plan:     1.)   Stage IC2 clear cell carcinoma of the ovary. Labs reviewed with patient at todays visit. Chemotherapy teaching completed by Shanon Oswald RN. I reviewed possible sideeffects at todays visit. Cycle #1 Carbo/Taxol. Plan to complete 3 cycle of carbo/taxol and follow up with Dr Shaffer.     2.) Genetic risk factors were assessed and the patient does meet the qualifications for a referral.      3.) Labs and/or tests ordered include:  CBC, CMP. Mag,      4.) Health maintenance issues addressed today include follow up with PCP regarding comorbid conditions and routine health maintenance  5.) Anxiety/Nervousness/Anger - patient is to follow up with clinical psychology.  6.) DVT - continue to take xarelto. Patient reports hematology would like her labs drawn in a couple of weeks. She will return in 2 weeks for labs.     Concepcion Kohler CNP  8/16/2017 5:03 PM    CC  Patient Care Team:  Morelia Ayon NP as PCP - Karla Perez, RN as Continuity Care Coordinator (Gyn-Onc)

## 2017-08-16 NOTE — PATIENT INSTRUCTIONS
Contact Numbers    [facility] Main Line: [phone]    Triage Nurse Line: [phone]      Please call the [facility] Nurse Triage line if you experience a temperature greater than or equal to 100.5, shaking chills, have uncontrolled nausea, vomiting and/or diarrhea, dizziness, shortness of breath, bleeding not relieved with pressure, or if you have any other questions or concerns.     If it is after hours, weekends, or holidays, please call the main hospital  at  [phone] and ask to speak to the adult Oncology doctor on call.     If you are having any concerning symptoms or wish to speak to a provider before your next infusion visit, please call your care coordinator or triage them so we can adequately serve you.      If you need to refill your narcotic prescription or other medication, please call triage before your infusion appointment.        August 2017 Sunday Monday Tuesday Wednesday Thursday Friday Saturday             1     LYMPHEDEMA EVALUATION    1:30 PM   (75 min.)   Yesenia Camacho, PT   [facility] Lymphedema 2     LONG    2:15 PM   (30 min.)   Morelia Ayon, NP   [facility]     CT CHEST WWO    3:45 PM   (20 min.)   [id number]   [facility] CT 3     LAB    3:45 PM   (15 min.)    LAB   [facility] 4     5       6     7     8     LAB    3:00 PM   (15 min.)    LAB   [facility] 9     10     11     LAB   12:45 PM   (15 min.)    LAB   [facility] Lab     US LWR EXT VENOUS DUPLEX LEFT    1:00 PM   (60 min.)   [id number]   [facility] US     RETURN CLOTTING DISORDER    1:45 PM   (30 min.)   William Villarreal MD   [facility] Bleeding and Clotting 12       13     14     15     16     [facility] LAB DRAW    7:30 AM   (15 min.)    [facility] LAB DRAW   [facility] Lab Draw     [facility] RETURN ACTIVE TREATMENT    7:45 AM   (30 min.)   Concepcion Kohler, MARLON JUAREZ   [facility] Cancer [facility]  ONC INFUSION 360    9:00 AM   (360 min.)   UC ONCOLOGY INFUSION   [facility]     UMP NEW   12:15 PM   (30 min.)   Pavan Gallegos LMFT   [facility] 17     18     19       20     21     MA SCREENING DIGITAL BILATERAL    2:45 PM   (15 min.)   [id number]   [facility] Breast [facility] 22     23     24     25     26       27     28     29     30     P [facility] LAB DRAW    8:15 AM   (15 min.)    [facility] LAB DRAW   [facility] Lab Draw 31 September 2017 Sunday Monday Tuesday Wednesday Thursday Friday Saturday                            1     UMP NEW WITH ROOM    2:00 PM   (75 min.)   Mariam Sams GC   [facility] 2       3     4     5     P [facility] LAB DRAW    8:30 AM   (15 min.)    [facility] LAB DRAW   [facility] Lab Draw     [facility] RETURN ACTIVE TREATMENT    8:45 AM   (30 min.)   Concepcion Kohler APRN CNP   [facility] 6     7     8     9       10     11     12     [facility] NEW WITH ROOM    2:45 PM   (60 min.)   Lola Vasquez, PhD [facility] 13     14     15     16       17     18     19     20     21     NEW   11:00 AM   (60 min.)   Lucina Reyes, [facility] 22     23       24     25     26     27     P [facility] LAB DRAW    7:45 AM   (15 min.)    [facility] LAB DRAW   [facility] Lab Draw     [facility] RETURN ACTIVE TREATMENT    8:05 AM   (40 min.)   Augustina Noriega APRN CNP   [facility] ONC INFUSION 360    9:00 AM   (360 min.)   UC ONCOLOGY INFUSION   [facility] 28     29     30                 Recent Results (from the past 24 hour(s))       Collection Time: 08/16/17  7:33 AM   Result Value Ref Range     73 (H) 0 - 30 U/mL   CBC with platelets differential    Collection Time: 08/16/17  7:33 AM   Result Value Ref Range    WBC 6.2 4.0 - 11.0 10e9/L    RBC Count 3.87 3.8 - 5.2  10e12/L    Hemoglobin 10.6 (L) 11.7 - 15.7 g/dL    Hematocrit 34.0 (L) 35.0 - 47.0 %    MCV 88 78 - 100 fl    MCH 27.4 26.5 - 33.0 pg    MCHC 31.2 (L) 31.5 - 36.5 g/dL    RDW 14.9 10.0 - 15.0 %    Platelet Count 382 150 - 450 10e9/L    Diff Method Automated Method     % Neutrophils 47.1 %    % Lymphocytes 37.8 %    % Monocytes 9.2 %    % Eosinophils 4.8 %    % Basophils 0.8 %    % Immature Granulocytes 0.3 %    Nucleated RBCs 0 0 /100    Absolute Neutrophil 2.9 1.6 - 8.3 10e9/L    Absolute Lymphocytes 2.3 0.8 - 5.3 10e9/L    Absolute Monocytes 0.6 0.0 - 1.3 10e9/L    Absolute Eosinophils 0.3 0.0 - 0.7 10e9/L    Absolute Basophils 0.1 0.0 - 0.2 10e9/L    Abs Immature Granulocytes 0.0 0 - 0.4 10e9/L    Absolute Nucleated RBC 0.0    Comprehensive metabolic panel    Collection Time: 08/16/17  7:33 AM   Result Value Ref Range    Sodium 140 133 - 144 mmol/L    Potassium 4.2 3.4 - 5.3 mmol/L    Chloride 107 94 - 109 mmol/L    Carbon Dioxide 24 20 - 32 mmol/L    Anion Gap 9 3 - 14 mmol/L    Glucose 120 (H) 70 - 99 mg/dL    Urea Nitrogen 20 7 - 30 mg/dL    Creatinine 0.78 0.52 - 1.04 mg/dL    GFR Estimate 74 >60 mL/min/1.7m2    GFR Estimate If Black >90 >60 mL/min/1.7m2    Calcium 8.9 8.5 - 10.1 mg/dL    Bilirubin Total 0.4 0.2 - 1.3 mg/dL    Albumin 3.5 3.4 - 5.0 g/dL    Protein Total 7.4 6.8 - 8.8 g/dL    Alkaline Phosphatase 96 40 - 150 U/L    ALT 14 0 - 50 U/L    AST 12 0 - 45 U/L   Magnesium    Collection Time: 08/16/17  7:33 AM   Result Value Ref Range    Magnesium 2.1 1.6 - 2.3 mg/dL

## 2017-08-17 ENCOUNTER — CARE COORDINATION (OUTPATIENT)
Dept: ONCOLOGY | Facility: CLINIC | Age: 62
End: 2017-08-17

## 2017-08-17 ENCOUNTER — TELEPHONE (OUTPATIENT)
Dept: FAMILY MEDICINE | Facility: CLINIC | Age: 62
End: 2017-08-17

## 2017-08-17 ENCOUNTER — TELEPHONE (OUTPATIENT)
Dept: ONCOLOGY | Facility: CLINIC | Age: 62
End: 2017-08-17

## 2017-08-17 ENCOUNTER — TELEPHONE (OUTPATIENT)
Dept: HEMATOLOGY | Facility: CLINIC | Age: 62
End: 2017-08-17

## 2017-08-17 DIAGNOSIS — S81.802A WOUND OF LEFT LEG: Primary | ICD-10-CM

## 2017-08-17 NOTE — TELEPHONE ENCOUNTER
Patient calling to get stool sample results & to discuss wound care & give update on her cancer treatment progress.  OK to leave message on voicemail.

## 2017-08-17 NOTE — OR NURSING
During preop phone instructions patient expressed anxiety about restarting chemo because of her reaction she had.  She will call and talk with Dr. Shaffer prior to next chemo.

## 2017-08-17 NOTE — TELEPHONE ENCOUNTER
"ONCOLOGY SOCIAL WORK  D) Hafsa is a 62-yr-old woman with stage IC ovarian cancer; hx of major depression and panic attacks  I) referral from RNCC to see pt on 8/1/17.  Writer out last week  A) Chart review:  Pt had a reaction and does not want to resume chemo until she meets with Dr. Moore  Per intpt SW notes, pt's sisters (Sue and Christine) report pt hoards.   She reported having support from her friend who is a rabbi.  Appreciated inpt chaplaincy support  She is scheduled to see psychologist Lola Vasquez on 9/12  (On 7-12-17 [facility] I presume she was discharged from).  SW note also says pt has felt \"passively suicidal\" in the past, and has some guilt about that now with her cancer dx    Discussed case with RN[facility] Shanon Roque and pt will be seeing psychology.  P) will attempt to see pt in infusion next week to offer support and resources    Ana Cristina Bernard, [facility]  [phone]    "

## 2017-08-17 NOTE — TELEPHONE ENCOUNTER
Hafsa Corona  MRN: [id number]  Female, 62 year old, 1955     Received a call from Shanon Buck from GYN-ONC office here.  They are planning on placing a port on Monday 8/21/17 in Hafsa Corona, and is wondering about how long to hold Xarelto.  Instructed her as to having patient hold for 24 hours prior to surgery and restart 12-24 hours after surgery. She will relay this to patient.  I will forward to Dr. Villarreal so that he is aware of her surgery.  Nevaeh Travis, RN, MSN -Nurse Clinician, [facility]

## 2017-08-17 NOTE — PROGRESS NOTES
Care Coordinator Note  Call from patient.  States she received pre op call regarding port.  Day and time scheduled are okay with her.  We again reviewed diet and scrub prep.  Informed patient that I had talked with Dr. Phil Davila's nurse and recommendation for her to hold Xarelto evening dose day before port procedure and to restart 12-24 hours after procedure.  Patient states she will hold dose Sunday evening and restart Monday evening, 12 hours after procedure.  Patient states she would like to have an appointment with Dr. Shaffer to discuss her questions.  Care coordinator will contact regarding day and time.  Chemotherapy appointment scheduled for 8/24/17 reviewed with patient.            Patient verbalized back understanding of the above information discussed.     Shanon SMITH, RN  Care Coordinator  Gynecologic Cancer   Office:  [phone]  Pager: [phone] #[phone]

## 2017-08-18 ENCOUNTER — CARE COORDINATION (OUTPATIENT)
Dept: ONCOLOGY | Facility: CLINIC | Age: 62
End: 2017-08-18

## 2017-08-18 ENCOUNTER — ANESTHESIA EVENT (OUTPATIENT)
Dept: SURGERY | Facility: AMBULATORY SURGERY CENTER | Age: 62
End: 2017-08-18

## 2017-08-18 NOTE — TELEPHONE ENCOUNTER
MANUEL patient just wants you to read her oncology note so you are up to date with what is going on. She does thank you for your cares.  Shoshana Tena RN        Triage nurse spoke with patient. Gave stool results.    Needs more dressing packing(1/4 inch)  from [facility].  Calling them [phone].  FAX RX to [phone].  They need a new RX with details of dimensions:   2x2 inch and 1/2 inch deep left lower leg wound.   Patient doing self wound care.   DOD to sign off on Friday . done    FAXING A NEW [facility] FOR PACKING SUPPLIES.

## 2017-08-18 NOTE — PROGRESS NOTES
Care Coordinator Note  Patient given CA-125 result and questions answered regarding constipation.  She will try Senokot or Smooth Move tea today for constipation she is currently experiencing.      Patient verbalized back understanding of the above information discussed.     Shanon SMITH, RN  Care Coordinator  Gynecologic Cancer   Office:  [phone]  Pager: [phone] #[phone]

## 2017-08-21 ENCOUNTER — HOSPITAL ENCOUNTER (OUTPATIENT)
Facility: AMBULATORY SURGERY CENTER | Age: 62
End: 2017-08-21
Attending: PHYSICIAN ASSISTANT

## 2017-08-21 ENCOUNTER — SURGERY (OUTPATIENT)
Age: 62
End: 2017-08-21

## 2017-08-21 ENCOUNTER — ANESTHESIA (OUTPATIENT)
Dept: SURGERY | Facility: AMBULATORY SURGERY CENTER | Age: 62
End: 2017-08-21

## 2017-08-21 VITALS
RESPIRATION RATE: 16 BRPM | HEIGHT: 63 IN | DIASTOLIC BLOOD PRESSURE: 81 MMHG | OXYGEN SATURATION: 95 % | TEMPERATURE: 98.5 F | BODY MASS INDEX: 32.07 KG/M2 | WEIGHT: 181 LBS | SYSTOLIC BLOOD PRESSURE: 122 MMHG

## 2017-08-21 LAB
GLUCOSE BLDC GLUCOMTR-MCNC: 130 MG/DL (ref 70–99)
INR PPP: 1.05 (ref 0.86–1.14)

## 2017-08-21 DEVICE — CATH PORT POWERPORT CLEARVUE SLIM 6FR 5616000
Type: IMPLANTABLE DEVICE | Site: CHEST | Status: NON-FUNCTIONAL
Removed: 2021-11-12

## 2017-08-21 RX ORDER — PROPOFOL 10 MG/ML
INJECTION, EMULSION INTRAVENOUS PRN
Status: DISCONTINUED | OUTPATIENT
Start: 2017-08-21 | End: 2017-08-21

## 2017-08-21 RX ORDER — LIDOCAINE 40 MG/G
CREAM TOPICAL
Status: DISCONTINUED | OUTPATIENT
Start: 2017-08-21 | End: 2017-08-22 | Stop reason: HOSPADM

## 2017-08-21 RX ORDER — FENTANYL CITRATE 50 UG/ML
25-50 INJECTION, SOLUTION INTRAMUSCULAR; INTRAVENOUS
Status: DISCONTINUED | OUTPATIENT
Start: 2017-08-21 | End: 2017-08-22 | Stop reason: HOSPADM

## 2017-08-21 RX ORDER — HEPARIN SODIUM (PORCINE) LOCK FLUSH IV SOLN 100 UNIT/ML 100 UNIT/ML
5 SOLUTION INTRAVENOUS
Status: DISCONTINUED | OUTPATIENT
Start: 2017-08-21 | End: 2017-08-22 | Stop reason: HOSPADM

## 2017-08-21 RX ORDER — LIDOCAINE HYDROCHLORIDE 20 MG/ML
INJECTION, SOLUTION INFILTRATION; PERINEURAL PRN
Status: DISCONTINUED | OUTPATIENT
Start: 2017-08-21 | End: 2017-08-21

## 2017-08-21 RX ORDER — HEPARIN SODIUM,PORCINE 10 UNIT/ML
5 VIAL (ML) INTRAVENOUS EVERY 24 HOURS
Status: DISCONTINUED | OUTPATIENT
Start: 2017-08-21 | End: 2017-08-22 | Stop reason: HOSPADM

## 2017-08-21 RX ORDER — DEXAMETHASONE SODIUM PHOSPHATE 4 MG/ML
4 INJECTION, SOLUTION INTRA-ARTICULAR; INTRALESIONAL; INTRAMUSCULAR; INTRAVENOUS; SOFT TISSUE EVERY 10 MIN PRN
Status: DISCONTINUED | OUTPATIENT
Start: 2017-08-21 | End: 2017-08-22 | Stop reason: HOSPADM

## 2017-08-21 RX ORDER — MEPERIDINE HYDROCHLORIDE 25 MG/ML
12.5 INJECTION INTRAMUSCULAR; INTRAVENOUS; SUBCUTANEOUS
Status: DISCONTINUED | OUTPATIENT
Start: 2017-08-21 | End: 2017-08-22 | Stop reason: HOSPADM

## 2017-08-21 RX ORDER — ONDANSETRON 2 MG/ML
4 INJECTION INTRAMUSCULAR; INTRAVENOUS EVERY 30 MIN PRN
Status: DISCONTINUED | OUTPATIENT
Start: 2017-08-21 | End: 2017-08-22 | Stop reason: HOSPADM

## 2017-08-21 RX ORDER — GABAPENTIN 300 MG/1
300 CAPSULE ORAL ONCE
Status: COMPLETED | OUTPATIENT
Start: 2017-08-21 | End: 2017-08-21

## 2017-08-21 RX ORDER — ACETAMINOPHEN 325 MG/1
975 TABLET ORAL ONCE
Status: COMPLETED | OUTPATIENT
Start: 2017-08-21 | End: 2017-08-21

## 2017-08-21 RX ORDER — SODIUM CHLORIDE, SODIUM LACTATE, POTASSIUM CHLORIDE, CALCIUM CHLORIDE 600; 310; 30; 20 MG/100ML; MG/100ML; MG/100ML; MG/100ML
INJECTION, SOLUTION INTRAVENOUS CONTINUOUS
Status: DISCONTINUED | OUTPATIENT
Start: 2017-08-21 | End: 2017-08-22 | Stop reason: HOSPADM

## 2017-08-21 RX ORDER — NALOXONE HYDROCHLORIDE 0.4 MG/ML
.1-.4 INJECTION, SOLUTION INTRAMUSCULAR; INTRAVENOUS; SUBCUTANEOUS
Status: DISCONTINUED | OUTPATIENT
Start: 2017-08-21 | End: 2017-08-22 | Stop reason: HOSPADM

## 2017-08-21 RX ORDER — SODIUM CHLORIDE 9 MG/ML
INJECTION, SOLUTION INTRAVENOUS CONTINUOUS
Status: DISCONTINUED | OUTPATIENT
Start: 2017-08-21 | End: 2017-08-22 | Stop reason: HOSPADM

## 2017-08-21 RX ORDER — ONDANSETRON 4 MG/1
4 TABLET, ORALLY DISINTEGRATING ORAL EVERY 30 MIN PRN
Status: DISCONTINUED | OUTPATIENT
Start: 2017-08-21 | End: 2017-08-22 | Stop reason: HOSPADM

## 2017-08-21 RX ORDER — ALBUTEROL SULFATE 0.83 MG/ML
2.5 SOLUTION RESPIRATORY (INHALATION) EVERY 4 HOURS PRN
Status: DISCONTINUED | OUTPATIENT
Start: 2017-08-21 | End: 2017-08-22 | Stop reason: HOSPADM

## 2017-08-21 RX ADMIN — SODIUM CHLORIDE, SODIUM LACTATE, POTASSIUM CHLORIDE, CALCIUM CHLORIDE: 600; 310; 30; 20 INJECTION, SOLUTION INTRAVENOUS at 08:26

## 2017-08-21 RX ADMIN — Medication 30 ML: at 10:03

## 2017-08-21 RX ADMIN — PROPOFOL 50 MG: 10 INJECTION, EMULSION INTRAVENOUS at 09:58

## 2017-08-21 RX ADMIN — LIDOCAINE HYDROCHLORIDE 30 MG: 20 INJECTION, SOLUTION INFILTRATION; PERINEURAL at 09:59

## 2017-08-21 RX ADMIN — ACETAMINOPHEN 975 MG: 325 TABLET ORAL at 08:35

## 2017-08-21 RX ADMIN — GABAPENTIN 300 MG: 300 CAPSULE ORAL at 08:35

## 2017-08-21 ASSESSMENT — ENCOUNTER SYMPTOMS: DYSRHYTHMIAS: 1

## 2017-08-21 NOTE — ANESTHESIA CARE TRANSFER NOTE
Patient: Hafsa Corona    Procedure(s):  Single Lumen Chest Power Port - Wound Class: I-Clean    Diagnosis: Ovarian Cancer, Left  Diagnosis Additional Information: No value filed.    Anesthesia Type:   MAC     Note:  Airway :Room Air  Patient transferred to:Phase II  Comments: PT. ALERT REPORT GIVEN TO RN.      Vitals: (Last set prior to Anesthesia Care Transfer)    CRNA VITALS  8/21/2017 1031 - 8/21/2017 1101      8/21/2017             Resp Rate (observed): (!)  1    Resp Rate (set): 10                Electronically Signed By: MARLON Chapin CRNA  August 21, 2017  11:01 AM

## 2017-08-21 NOTE — IP AVS SNAPSHOT
MRN:[id number]                      After Visit Summary   8/21/2017    Hafsa Corona    MRN: [id number]           Thank you!     Thank you for choosing [facility] for your care. Our goal is always to provide you with excellent care. Hearing back from our patients is one way we can continue to improve our services. Please take a few minutes to complete the written survey that you may receive in the mail after you visit with us. Thank you!        Patient Information     Date Of Birth          1955        About your hospital stay     You were admitted on:  August 21, 2017 You last received care in the:  [facility]    You were discharged on:  August 21, 2017       Who to Call     For medical emergencies, please call 911.  For non-urgent questions about your medical care, please call your primary care provider or clinic, [phone]  For questions related to your surgery, please call your surgery clinic        Attending Provider     Provider Stephen Hinton PA-C Radiology       Primary Care Provider Office Phone # Fax #    Morelia Ayon [phone]      Your next 10 appointments already scheduled     Aug 21, 2017  3:00 PM CDT   (Arrive by 2:45 PM)   MA SCREENING DIGITAL BILATERAL with [id number]   [facility] ([facility])    [address] [address] [phone]           Do not use any powder, lotion or deodorant under your arms or on your breast. If you do, we will ask you to remove it before your exam.  Wear comfortable, two-piece clothing.  If you have any allergies, tell your care team.  Bring any previous mammograms from other facilities or have them mailed to the breast center. Three-dimensional (3D) mammograms are available at [facility] locations in [facility], and [place]. [facility] locations include [place]  "[facility] in [place]. Benefits of 3D mammograms include: - Improved rate of cancer detection - Decreases your chance of having to go back for more tests, which means fewer: - \"False-positive\" results (This means that there is an abnormal area but it isn't cancer.) - Invasive testing procedures, such as a biopsy or surgery - Can provide clearer images of the breast if you have dense breast tissue. 3D mammography is an optional exam that anyone can have with a 2D mammogram. It doesn't replace or take the place of a 2D mammogram. 2D mammograms remain an effective screening test for all women.  Not all insurance companies cover the cost of a 3D mammogram. Check with your insurance.            Aug 23, 2017 11:30 AM CDT   (Arrive by 11:15 AM)   Return Visit with Jarod Shaffer MD   [facility] ([facility])    [address] [place] [address]   [phone]            Aug 24, 2017  9:30 AM CDT   [facility] with [facility]   [facility] ([facility])    [address] [place] [address]   [phone]            Aug 24, 2017 10:00 AM CDT   Infusion 360 with  ONCOLOGY INFUSION,  31 ATC   [facility])    [address] [place] [address]   [phone]            Sep 01, 2017  2:15 PM CDT   (Arrive by 2:00 PM)   NEW WITH ROOM with Mariam Sams GC,  2 114 CONSULT [facility] ([facility])    [address] [place] [address]   [phone]            Sep 12, 2017  3:00 PM CDT   (Arrive by 2:45 PM)   NEW WITH ROOM with Lola Vasquez, PhD ,  2 114 CONSULT [facility] ([facility])    [address] [place] [address] "   [phone]            Sep 21, 2017 11:00 AM CDT   New Visit with Lucina Reyes, [facility] ([facility])    [address] [place] [address] [phone]              Further instructions from your care team       [facility]  Home Care Following Anesthesia  For 24 hours after surgery:  1. Get plenty of rest.  A responsible adult must stay with you for at least 24 hours after you leave the surgery center.  2. Do not drive or use heavy equipment.  If you have weakness or tingling, don't drive or use heavy equipment until this feeling goes away.   3. Do not drink alcohol.   4. Avoid strenuous or risky activities.  Ask for help when climbing stairs.  5. You may feel lightheaded.  IF so, sit for a few minutes before standing.  Have someone help you get up.   6. If you have nausea (feel sick to your stomach): Drink only clear liquids such as apple juice, ginger ale, broth or 7-Up.  Rest may also help.  Be sure to drink enough fluids.  Move to a regular diet as you feel able.   7. You may have a slight fever.  Call the doctor if your fever is over 100 F (37.7 C) (taken under the tongue) or lasts longer than 24 hours.  8. You may have a dry mouth, a sore throat, muscle aches or trouble sleeping. These should go away after 24 hours.  9. Do not make important or legal decisions.        Tips for taking pain medications  To get the best pain relief possible, remember these points:    Take pain medications as directed, before pain becomes severe.    Pain medication can upset your stomach: taking it with food may help.    Constipation is a common side effect of pain medication. Drink plenty of  fluids.    Eat foods high in fiber. Take a stool softener if recommended by your doctor or pharmacist.    Do not drink alcohol, drive or operate machinery while taking pain medications.    Ask about other ways to control pain, such as with  heat, ice or relaxation.    Tylenol/Acetaminophen Consumption  To help encourage the safe use of acetaminophen, the makers of TYLENOL  have lowered the maximum daily dose for single-ingredient Extra Strength TYLENOL  (acetaminophen) products sold in the [place] from 8 pills per day (4,000 mg) to 6 pills per day (3,000 mg). The dosing interval has also changed from 2 pills every 4-6 hours to 2 pills every 6 hours.    If you feel your pain relief is insufficient, you may take Tylenol/Acetaminophen in addition to your narcotic pain medication.     Be careful not to exceed 3,000 mg of Tylenol/Acetaminophen in a 24 hour period from all sources.    If you are taking extra strength Tylenol/acetaminophen (500 mg), the maximum dose is 6 tablets in 24 hours.    If you are taking regular strength acetaminophen (325 mg), the maximum dose is 9 tablets in 24 hours.    Call a doctor for any of the followin. Signs of infection (fever, growing tenderness at the surgery site, a large amount of drainage or bleeding, severe pain, foul-smelling drainage, redness, swelling).  2. It has been over 8 to 10 hours since surgery and you are still not able to urinate (pass water).  3. Headache for over 24 hours.  4. Numbness, tingling or weakness the day after surgery (if you had spinal anesthesia).    Your provider is: Stephen Mike PA-C --Interventional Radiology [phone]                 Or dial [phone] and ask for the resident on call for:  Interventional Radiology  For emergency care, call the:  [facility]:  [phone] (TTY for hearing impaired: [phone])      A collaboration between [facility] and [facility]  Experts in minimally invasive, targeted treatments performed using imaging guidance    Venous Access Device,  Port Catheter or Tunneled Central Line Placement    Today you had a procedure today to install a venous access device; either a  tunneled central vein catheter or a subcutaneous port catheter.    One of our Radiology PAs performed this procedure for you today:  ? Stephen Mike PA-C    After you go home:  - Drink plenty of fluids.  Generally 6-8 (8 ounce) glasses a day is recommended.  - Resume your regular diet unless otherwise ordered by a medical provider.  - Keep any applied tape/gauze dressings clean and dry.  Change tape/gauze dressings if they get wet or soiled.  - You may shower the following day after procedure, however cover and protect from moisture any tape/gauze dressings.  You may let water hit and run over dried skin glue, but do not scrub.  Pat the area dry after showering.  - Port placement incisions are closed with absorbable suture, meaning they do not need to be removed at a later date, and a topical skin adhesive (skin glue).  This glue will wear off in 7-14 days.  Do not remove before this time.  If 14 days have passed and residual glue is present, you may gently remove it.  - Do not apply gels, lotions, or ointments to the glue site for the first 10 days as this may cause the glue to prematurely soften and fail.  - Do not perform strenuous activities or lift greater than 10 pounds for the next three days.  - If there is bleeding or oozing from the procedure site, apply firm pressure to the area for 5-10 minutes.  If the bleeding continues seek medical advice at the numbers below.  - Mild procedure site discomfort can be treated with an ice pack and over-the-counter pain relievers.        For 24 hours after any sedation used:  - Relax and take it easy.  No strenuous activities.  - Do not drive or operate machines at home or at work.  - No alcohol consumption.  - Do not make any important or legal decisions.    Call our Interventional Radiology (IR) service if:  - If you start bleeding from the procedure site.  If you do start to bleed from the site, lie down and hold some pressure on the site.  Our radiology provider  "can help you decide if you need to return to the hospital.  - If you have new or worsening pain related to the procedure.  - If you have concerning swelling at the procedure site.  - If you develop persistent nausea or vomiting.  - If you develop hives or a rash or any unexplained itching.  - If you have a fever of greater than 100.5  F and chills in the first 5 days after procedure.  - Any other concerns related to your procedure.      [facility]  Interventional Radiology (IR)  [address]  2nd Floor, [facility] [place] [address]    Contact Number:  [phone]  (IR control desk)  - Monday - Friday 8:00 am - 4:30 pm    After hours for urgent concerns:  [phone]  - After 4:30 pm Monday - Friday, Weekends and Holidays.   - Ask for Interventional Radiology on-call.  Someone is available 24 hours a day.  - [facility] toll free number:  [phone]              Pending Results     Date and Time Order Name Status Description    8/21/2017 0907 IR CHEST PORT PLACEMENT > 5 YRS OF AGE In process             Admission Information     Date & Time Provider Department Dept. Phone    8/21/2017 Stephen Mike PA-C [facility] [phone]      Your Vitals Were     Blood Pressure Temperature Respirations Height Weight Last Period    117/74 98.1  F (36.7  C) (Oral) 16 1.6 m (5' 2.99\") 82.1 kg (181 lb) 01/01/2009    Pulse Oximetry BMI (Body Mass Index)                95% 32.07 kg/m2          [employer] Information     [employer] is an electronic gateway that provides easy, online access to your medical records. With [employer], you can request a clinic appointment, read your test results, renew a prescription or communicate with your care team.     To sign up for [employer] visit the website at [email or web address]   You will be asked to enter the access code listed below, as well as some personal information. Please follow the directions to create your " username and password.     Your access code is: [email or web address]  Expires: 10/10/2017 11:16 AM     Your access code will  in 90 days. If you need help or a new code, please contact your [facility] or call [phone] for assistance.        Care EveryWhere ID     This is your Care EveryWhere ID. This could be used by other organizations to access your [facility] medical records  [id number]        Equal Access to Services     RONEY ARANDA : Hadii aad ku hadasho Soomaali, waaxda luqadaha, qaybta kaalmada adeegyada, waxay idiin hayaan adewood cavazosgeorgieken larell . So [facility] [phone].    ATENCIÓN: Si habla español, tiene a martínez disposición servicios gratuitos de asistencia lingüística. Llame al [phone].    We comply with applicable federal civil rights laws and [place] laws. We do not discriminate on the basis of race, color, national origin, age, disability sex, sexual orientation or gender identity.               Review of your medicines      UNREVIEWED medicines. Ask your doctor about these medicines        Dose / Directions    acetaminophen 325 MG tablet   Commonly known as:  TYLENOL   Used for:  Cellulitis and abscess of leg, Mass of pelvis        Dose:  650 mg   Take 2 tablets (650 mg) by mouth every 6 hours as needed for mild pain   Quantity:  100 tablet   Refills:  0       albuterol 108 (90 BASE) MCG/ACT Inhaler   Commonly known as:  albuterol   Used for:  Cough        Dose:  2 puff   Inhale 2 puffs into the lungs 4 times daily as needed for shortness of breath / dyspnea   Quantity:  1 Inhaler   Refills:  PRN       atorvastatin 80 MG tablet   Commonly known as:  LIPITOR   Used for:  Hyperlipidemia LDL goal <100        Dose:  80 mg   Take 1 tablet (80 mg) by mouth daily   Quantity:  90 tablet   Refills:  PRN       CALCIUM-MAGNESIUM-VITAMIN D PO        Dose:  2 tablet   Take 2 tablets by mouth daily   Refills:  0       EPINEPHrine 0.3 MG/0.3ML injection 2-pack   Commonly known as:   EPIPEN/ADRENACLICK/or ANY BX GENERIC EQUIV   Used for:  Bee allergy status        Dose:  0.3 mg   Inject 0.3 mLs (0.3 mg) into the muscle once as needed for anaphylaxis   Quantity:  1 each   Refills:  PRN       escitalopram 20 MG tablet   Commonly known as:  LEXAPRO   Used for:  Major depressive disorder, recurrent episode, mild (H)        Dose:  20 mg   Take 1 tablet (20 mg) by mouth daily   Quantity:  30 tablet   Refills:  PRN       fluticasone 50 MCG/ACT spray   Commonly known as:  FLONASE   Used for:  Chronic maxillary sinusitis        Dose:  2 spray   Spray 2 sprays into both nostrils daily   Quantity:  48 g   Refills:  1       lisinopril 10 MG tablet   Commonly known as:  PRINIVIL/ZESTRIL   Used for:  Essential hypertension        Dose:  10 mg   Take 1 tablet (10 mg) by mouth daily   Quantity:  90 tablet   Refills:  PRN       LORazepam 1 MG tablet   Commonly known as:  ATIVAN   Used for:  Ovarian cancer, left (H), Encounter for long-term (current) use of medications        Dose:  1 mg   Take 1 tablet (1 mg) by mouth every 6 hours as needed (nausea/vomiting, anxiety or sleep)   Quantity:  30 tablet   Refills:  1       metFORMIN 500 MG 24 hr tablet   Commonly known as:  GLUCOPHAGE-XR   Used for:  Type 2 diabetes mellitus without complication, without long-term current use of insulin (H)        Take two tabs by mouth once daily with evening meal.   Quantity:  180 tablet   Refills:  PRN       MULTIVITAMIN ADULT PO        Dose:  1 tablet   Take 1 tablet by mouth daily   Refills:  0       polyethylene glycol Packet   Commonly known as:  MIRALAX/GLYCOLAX   Used for:  Mass of pelvis        Dose:  17 g   Take 17 g by mouth daily   Quantity:  7 packet   Refills:  0       PRO-BIOTIC BLEND PO        Dose:  1 capsule   Take 1 capsule by mouth daily Enzymatic Therapy-probiotic pearls   Refills:  0       prochlorperazine 10 MG tablet   Commonly known as:  COMPAZINE   Used for:  Ovarian cancer, left (H), Encounter for  long-term (current) use of medications        Dose:  10 mg   Take 1 tablet (10 mg) by mouth every 6 hours as needed (nausea/vomiting)   Quantity:  30 tablet   Refills:  2       rivaroxaban ANTICOAGULANT 20 MG Tabs tablet   Commonly known as:  XARELTO   Used for:  Long-term (current) use of anticoagulants, Acute deep vein thrombosis (DVT) of left lower extremity, unspecified vein (H)        Dose:  20 mg   Take 1 tablet (20 mg) by mouth daily (with dinner)   Quantity:  30 tablet   Refills:  11       senna-docusate 8.6-50 MG per tablet   Commonly known as:  SENOKOT-S;PERICOLACE   Used for:  Mass of pelvis        Dose:  2 tablet   Take 2 tablets by mouth 2 times daily Start with 1 tablet PO BID, If no bowel movement in 24 hours, increase to 2 tablets PO BID.  Hold for loose stools.   Quantity:  100 tablet   Refills:  0       traZODone 50 MG tablet   Commonly known as:  DESYREL   Used for:  Insomnia, unspecified type        TAKE 1 TO 2 TABLETS(50  MG) BY MOUTH EVERY NIGHT AS NEEDED FOR SLEEP   Quantity:  180 tablet   Refills:  1       vitamin B complex with vitamin C Tabs tablet        Dose:  1 tablet   Take 1 tablet by mouth daily   Refills:  0       vitamin D 1000 UNITS capsule        Dose:  2000 Units   Take 2,000 Units by mouth daily   Refills:  0       VYVANSE PO        Dose:  50 mg   Take 50 mg by mouth daily   Refills:  0         CONTINUE these medicines which have NOT CHANGED        Dose / Directions    order for DME   Used for:  Essential hypertension        Equipment being ordered: blood pressure monitor   Quantity:  1 Units   Refills:  0       * order for DME   Used for:  Long-term (current) use of anticoagulants, Acute deep vein thrombosis (DVT) of left lower extremity, unspecified vein (H)        Compression stockings 20-30 mm Hg. To be wear when directed by Hematology team and while awake for the first two years post clot.   Quantity:  1 each   Refills:  0       * order for DME   Used for:  Abscess of  "leg        Plain packing strip 1/4\"   Quantity:  1 Bottle   Refills:  PRN       * order for DME   Used for:  Abscess of leg        Sterile zoey tipped applicators   Quantity:  1 Box   Refills:  PRN       * order for DME   Used for:  Wound of left leg        USE 1/4 INCH  WIDTH PLAIN NU GAUZE PACKING TO DO SELF WOUND CARE OF LEFT LOWER LEG ONCE DAILY. DIMENSIONS OF WOUND PRESENTLY ARE 2 X 2 INCH AND APPROXIMATELY 1/2 INCH DEEP.  USES 6 INCHES OF PACKING CURRENTLY FOR DRESSING CHANGE.  FAX TO [facility] [phone]   Quantity:  3 Bottle   Refills:  2       * Notice:  This list has 4 medication(s) that are the same as other medications prescribed for you. Read the directions carefully, and ask your doctor or other care provider to review them with you.             Protect others around you: Learn how to safely use, store and throw away your medicines at www.disposemymeds.org.             Medication List: This is a list of all your medications and when to take them. Check marks below indicate your daily home schedule. Keep this list as a reference.      Medications           Morning Afternoon Evening Bedtime As Needed    acetaminophen 325 MG tablet   Commonly known as:  TYLENOL   Take 2 tablets (650 mg) by mouth every 6 hours as needed for mild pain   Last time this was given:  975 mg on 8/21/2017  8:35 AM                                albuterol 108 (90 BASE) MCG/ACT Inhaler   Commonly known as:  albuterol   Inhale 2 puffs into the lungs 4 times daily as needed for shortness of breath / dyspnea                                atorvastatin 80 MG tablet   Commonly known as:  LIPITOR   Take 1 tablet (80 mg) by mouth daily                                CALCIUM-MAGNESIUM-VITAMIN D PO   Take 2 tablets by mouth daily                                EPINEPHrine 0.3 MG/0.3ML injection 2-pack   Commonly known as:  EPIPEN/ADRENACLICK/or ANY BX GENERIC EQUIV   Inject 0.3 mLs (0.3 mg) into the muscle once as needed for " "anaphylaxis                                escitalopram 20 MG tablet   Commonly known as:  LEXAPRO   Take 1 tablet (20 mg) by mouth daily                                fluticasone 50 MCG/ACT spray   Commonly known as:  FLONASE   Spray 2 sprays into both nostrils daily                                lisinopril 10 MG tablet   Commonly known as:  PRINIVIL/ZESTRIL   Take 1 tablet (10 mg) by mouth daily                                LORazepam 1 MG tablet   Commonly known as:  ATIVAN   Take 1 tablet (1 mg) by mouth every 6 hours as needed (nausea/vomiting, anxiety or sleep)                                metFORMIN 500 MG 24 hr tablet   Commonly known as:  GLUCOPHAGE-XR   Take two tabs by mouth once daily with evening meal.                                MULTIVITAMIN ADULT PO   Take 1 tablet by mouth daily                                order for DME   Equipment being ordered: blood pressure monitor                                * order for DME   Compression stockings 20-30 mm Hg. To be wear when directed by Hematology team and while awake for the first two years post clot.                                * order for DME   Plain packing strip 1/4\"                                * order for DME   Sterile zoey tipped applicators                                * order for DME   USE 1/4 INCH  WIDTH PLAIN NU GAUZE PACKING TO DO SELF WOUND CARE OF LEFT LOWER LEG ONCE DAILY. DIMENSIONS OF WOUND PRESENTLY ARE 2 X 2 INCH AND APPROXIMATELY 1/2 INCH DEEP.  USES 6 INCHES OF PACKING CURRENTLY FOR DRESSING CHANGE.  FAX TO [facility] [phone]                                polyethylene glycol Packet   Commonly known as:  MIRALAX/GLYCOLAX   Take 17 g by mouth daily                                PRO-BIOTIC BLEND PO   Take 1 capsule by mouth daily Enzymatic Therapy-probiotic pearls                                prochlorperazine 10 MG tablet   Commonly known as:  COMPAZINE   Take 1 tablet (10 mg) by mouth every 6 hours as " needed (nausea/vomiting)                                rivaroxaban ANTICOAGULANT 20 MG Tabs tablet   Commonly known as:  XARELTO   Take 1 tablet (20 mg) by mouth daily (with dinner)                                senna-docusate 8.6-50 MG per tablet   Commonly known as:  SENOKOT-S;PERICOLACE   Take 2 tablets by mouth 2 times daily Start with 1 tablet PO BID, If no bowel movement in 24 hours, increase to 2 tablets PO BID.  Hold for loose stools.                                traZODone 50 MG tablet   Commonly known as:  DESYREL   TAKE 1 TO 2 TABLETS(50  MG) BY MOUTH EVERY NIGHT AS NEEDED FOR SLEEP                                vitamin B complex with vitamin C Tabs tablet   Take 1 tablet by mouth daily                                vitamin D 1000 UNITS capsule   Take 2,000 Units by mouth daily                                VYVANSE PO   Take 50 mg by mouth daily                                * Notice:  This list has 4 medication(s) that are the same as other medications prescribed for you. Read the directions carefully, and ask your doctor or other care provider to review them with you.

## 2017-08-21 NOTE — DISCHARGE INSTRUCTIONS
[facility]  Home Care Following Anesthesia  For 24 hours after surgery:  1. Get plenty of rest.  A responsible adult must stay with you for at least 24 hours after you leave the surgery center.  2. Do not drive or use heavy equipment.  If you have weakness or tingling, don't drive or use heavy equipment until this feeling goes away.   3. Do not drink alcohol.   4. Avoid strenuous or risky activities.  Ask for help when climbing stairs.  5. You may feel lightheaded.  IF so, sit for a few minutes before standing.  Have someone help you get up.   6. If you have nausea (feel sick to your stomach): Drink only clear liquids such as apple juice, ginger ale, broth or 7-Up.  Rest may also help.  Be sure to drink enough fluids.  Move to a regular diet as you feel able.   7. You may have a slight fever.  Call the doctor if your fever is over 100 F (37.7 C) (taken under the tongue) or lasts longer than 24 hours.  8. You may have a dry mouth, a sore throat, muscle aches or trouble sleeping. These should go away after 24 hours.  9. Do not make important or legal decisions.        Tips for taking pain medications  To get the best pain relief possible, remember these points:    Take pain medications as directed, before pain becomes severe.    Pain medication can upset your stomach: taking it with food may help.    Constipation is a common side effect of pain medication. Drink plenty of  fluids.    Eat foods high in fiber. Take a stool softener if recommended by your doctor or pharmacist.    Do not drink alcohol, drive or operate machinery while taking pain medications.    Ask about other ways to control pain, such as with heat, ice or relaxation.    Tylenol/Acetaminophen Consumption  To help encourage the safe use of acetaminophen, the makers of TYLENOL  have lowered the maximum daily dose for single-ingredient Extra Strength TYLENOL  (acetaminophen) products sold in the [place] from 8 pills per  day (4,000 mg) to 6 pills per day (3,000 mg). The dosing interval has also changed from 2 pills every 4-6 hours to 2 pills every 6 hours.    If you feel your pain relief is insufficient, you may take Tylenol/Acetaminophen in addition to your narcotic pain medication.     Be careful not to exceed 3,000 mg of Tylenol/Acetaminophen in a 24 hour period from all sources.    If you are taking extra strength Tylenol/acetaminophen (500 mg), the maximum dose is 6 tablets in 24 hours.    If you are taking regular strength acetaminophen (325 mg), the maximum dose is 9 tablets in 24 hours.    Call a doctor for any of the followin. Signs of infection (fever, growing tenderness at the surgery site, a large amount of drainage or bleeding, severe pain, foul-smelling drainage, redness, swelling).  2. It has been over 8 to 10 hours since surgery and you are still not able to urinate (pass water).  3. Headache for over 24 hours.  4. Numbness, tingling or weakness the day after surgery (if you had spinal anesthesia).    Your provider is: Stephen Mike PA-C --Interventional Radiology [phone]                 Or dial [phone] and ask for the resident on call for:  Interventional Radiology  For emergency care, call the:  [facility]:  [phone] (TTY for hearing impaired: [phone])      A collaboration between [facility] and [facility]  Experts in minimally invasive, targeted treatments performed using imaging guidance    Venous Access Device,  Port Catheter or Tunneled Central Line Placement    Today you had a procedure today to install a venous access device; either a tunneled central vein catheter or a subcutaneous port catheter.    One of our Radiology PAs performed this procedure for you today:  ? Stephen Mike PA-C    After you go home:  - Drink plenty of fluids.  Generally 6-8 (8 ounce) glasses a day is recommended.  - Resume your regular  diet unless otherwise ordered by a medical provider.  - Keep any applied tape/gauze dressings clean and dry.  Change tape/gauze dressings if they get wet or soiled.  - You may shower the following day after procedure, however cover and protect from moisture any tape/gauze dressings.  You may let water hit and run over dried skin glue, but do not scrub.  Pat the area dry after showering.  - Port placement incisions are closed with absorbable suture, meaning they do not need to be removed at a later date, and a topical skin adhesive (skin glue).  This glue will wear off in 7-14 days.  Do not remove before this time.  If 14 days have passed and residual glue is present, you may gently remove it.  - Do not apply gels, lotions, or ointments to the glue site for the first 10 days as this may cause the glue to prematurely soften and fail.  - Do not perform strenuous activities or lift greater than 10 pounds for the next three days.  - If there is bleeding or oozing from the procedure site, apply firm pressure to the area for 5-10 minutes.  If the bleeding continues seek medical advice at the numbers below.  - Mild procedure site discomfort can be treated with an ice pack and over-the-counter pain relievers.        For 24 hours after any sedation used:  - Relax and take it easy.  No strenuous activities.  - Do not drive or operate machines at home or at work.  - No alcohol consumption.  - Do not make any important or legal decisions.    Call our Interventional Radiology (IR) service if:  - If you start bleeding from the procedure site.  If you do start to bleed from the site, lie down and hold some pressure on the site.  Our radiology provider can help you decide if you need to return to the hospital.  - If you have new or worsening pain related to the procedure.  - If you have concerning swelling at the procedure site.  - If you develop persistent nausea or vomiting.  - If you develop hives or a rash or any unexplained  itching.  - If you have a fever of greater than 100.5  F and chills in the first 5 days after procedure.  - Any other concerns related to your procedure.      [facility]  Interventional Radiology (IR)  [address]  [address] [facility] Waiting Room  [place] [id number]    Contact Number:  [phone]  (IR control desk)  - Monday - Friday 8:00 am - 4:30 pm    After hours for urgent concerns:  [phone]  - After 4:30 pm Monday - Friday, Weekends and Holidays.   - Ask for Interventional Radiology on-call.  Someone is available 24 hours a day.  - [facility] toll free number:  [phone]

## 2017-08-21 NOTE — ANESTHESIA PREPROCEDURE EVALUATION
Anesthesia Evaluation     . Pt has had prior anesthetic. Type: General    History of anesthetic complications: anterior airway noted on last anesthetic (rec'd VL)          ROS/MED HX    ENT/Pulmonary:  - neg pulmonary ROS     Neurologic:  - neg neurologic ROS     Cardiovascular:     (+) ----. : . . . :. dysrhythmias Irregular Heartbeat/Palpitations, .       METS/Exercise Tolerance:     Hematologic:     (+) History of blood clots pt is anticoagulated, Anemia, History of Transfusion no previous transfusion reaction -      Musculoskeletal:  - neg musculoskeletal ROS       GI/Hepatic:  - neg GI/hepatic ROS       Renal/Genitourinary:  - ROS Renal section negative       Endo:     (+) type II DM .      Psychiatric:     (+) psychiatric history anxiety      Infectious Disease:  - neg infectious disease ROS       Malignancy:         Other:                     Physical Exam  Normal systems: cardiovascular and pulmonary    Airway   Mallampati: III  TM distance: >3 FB  Neck ROM: full    Dental   (+) missing    Cardiovascular       Pulmonary     Other findings: Stated missing molar was broken and spontaneously fell out of socket                    Anesthesia Plan      History & Physical Review  History and physical reviewed and following examination; no interval change.    ASA Status:  2 .    NPO Status:  > 8 hours    Plan for MAC with Intravenous induction. Maintenance will be TIVA.    PONV prophylaxis:  Ondansetron (or other 5HT-3)       Postoperative Care  Postoperative pain management:  Multi-modal analgesia and Oral pain medications.      Consents  Anesthetic plan, risks, benefits and alternatives discussed with:  Patient.  Use of blood products discussed with Patient.  .                          .

## 2017-08-21 NOTE — BRIEF OP NOTE
Interventional Radiology Brief Post Procedure Note    Procedure: Central venous chest port placement.    Proceduralist: Yariel Mike PA-C    Assistant: None    Time Out: Prior to the start of the procedure and with procedural staff participation, I verbally confirmed the patient s identity using two indicators, relevant allergies, that the procedure was appropriate and matched the consent or emergent situation, and that the correct equipment/implants were available. Immediately prior to starting the procedure I conducted the Time Out with the procedural staff and re-confirmed the patient s name, procedure, and site/side. (The Joint Commission universal protocol was followed.)  Yes        Sedation: Monitored Anesthesia Care (MAC) administered and documented by Anesthesia Care Provider    Findings: Image guided placement of right IJ, 6 Fr., 24 cm., single chamber central venous chest port. Port is ready for immediate use.    Estimated Blood Loss: Less than 10 ml    Fluoroscopy Time: 0.2 minute(s)    SPECIMENS: None    Complications: 1. None     Condition: Stable    Plan: Follow up per primary team. Return to IR for port removal when indicated.    Comments: See dictated procedure note for full details.    Stephen Mike PA-C

## 2017-08-21 NOTE — ANESTHESIA POSTPROCEDURE EVALUATION
Patient: Hafsa Corona    Procedure(s):  Single Lumen Chest Power Port - Wound Class: I-Clean    Diagnosis:Ovarian Cancer, Left  Diagnosis Additional Information: No value filed.    Anesthesia Type:  MAC    Note:  Anesthesia Post Evaluation    Patient location during evaluation: PACU  Patient participation: Able to fully participate in evaluation  Level of consciousness: awake and alert  Pain management: adequate  Airway patency: patent  Cardiovascular status: hemodynamically stable  Respiratory status: nonlabored ventilation, spontaneous ventilation and room air  Hydration status: euvolemic  PONV: none     Anesthetic complications: None          Last vitals:  Vitals:    08/21/17 0732   BP: 117/74   Resp: 16   Temp: 36.7  C (98.1  F)   SpO2: 95%         Electronically Signed By: Leland Cadet MD  August 21, 2017  11:02 AM

## 2017-08-21 NOTE — IP AVS SNAPSHOT
[facility]    [address] [place] [address]    Phone:  [phone]    Fax:  [phone]                                       After Visit Summary   8/21/2017    Hafsa Corona    MRN: [id number]           After Visit Summary Signature Page     I have received my discharge instructions, and my questions have been answered. I have discussed any challenges I see with this plan with the nurse or doctor.    ..........................................................................................................................................  Patient/Patient Representative Signature      ..........................................................................................................................................  Patient Representative Print Name and Relationship to Patient    ..................................................               ................................................  Date                                            Time    ..........................................................................................................................................  Reviewed by Signature/Title    ...................................................              ..............................................  Date                                                            Time

## 2017-08-22 ENCOUNTER — TELEPHONE (OUTPATIENT)
Dept: FAMILY MEDICINE | Facility: CLINIC | Age: 62
End: 2017-08-22

## 2017-08-22 ENCOUNTER — CARE COORDINATION (OUTPATIENT)
Dept: ONCOLOGY | Facility: CLINIC | Age: 62
End: 2017-08-22

## 2017-08-22 DIAGNOSIS — S81.802A LEG WOUND, LEFT: Primary | ICD-10-CM

## 2017-08-22 NOTE — TELEPHONE ENCOUNTER
Reason for Call:  Other order for medical supplies    Detailed comments: Patient is requesting 4 inch or larger sterile adhesive pad bandages from [employer]. States it is getting expensive to pay out of pocket as she changes her bandages daily for wound care. Please follow up. Thanks!    Phone Number Patient can be reached at: Home number on file [phone] (home)    Best Time: Any    Can we leave a detailed message on this number? YES    Call taken on 8/22/2017 at 4:29 PM by Dang Mojica

## 2017-08-22 NOTE — PROGRESS NOTES
Care Coordinator Note  Left message for patient regarding clinic appointment with Dr. Shaffer on 8/23/17.      Shanon Oswald MSN, RN  Care Coordinator  Gynecologic Cancer   Office:  [phone]  Pager: [phone] #[phone]    Patient returned call and states she will attend scheduled clinic appointment.

## 2017-08-22 NOTE — TELEPHONE ENCOUNTER
RX printed and will fax to [employer]. Provider MANUEL signed off.  LMOM informing patient.  Shoshana Tena RN

## 2017-08-23 ENCOUNTER — CARE COORDINATION (OUTPATIENT)
Dept: ONCOLOGY | Facility: CLINIC | Age: 62
End: 2017-08-23

## 2017-08-23 ENCOUNTER — ONCOLOGY VISIT (OUTPATIENT)
Dept: ONCOLOGY | Facility: CLINIC | Age: 62
End: 2017-08-23
Attending: OBSTETRICS & GYNECOLOGY
Payer: COMMERCIAL

## 2017-08-23 VITALS
DIASTOLIC BLOOD PRESSURE: 77 MMHG | RESPIRATION RATE: 16 BRPM | SYSTOLIC BLOOD PRESSURE: 111 MMHG | OXYGEN SATURATION: 99 % | TEMPERATURE: 98.2 F | WEIGHT: 179.3 LBS | HEIGHT: 63 IN | HEART RATE: 89 BPM | BODY MASS INDEX: 31.77 KG/M2

## 2017-08-23 DIAGNOSIS — C56.2 OVARIAN CANCER, LEFT (H): Primary | ICD-10-CM

## 2017-08-23 DIAGNOSIS — Z85.43 PERSONAL HISTORY OF OVARIAN CANCER: Primary | ICD-10-CM

## 2017-08-23 DIAGNOSIS — Z79.899 ENCOUNTER FOR LONG-TERM (CURRENT) USE OF MEDICATIONS: ICD-10-CM

## 2017-08-23 PROCEDURE — 99212 OFFICE O/P EST SF 10 MIN: CPT | Mod: ZF

## 2017-08-23 PROCEDURE — 99215 OFFICE O/P EST HI 40 MIN: CPT | Mod: ZP | Performed by: OBSTETRICS & GYNECOLOGY

## 2017-08-23 RX ORDER — DEXAMETHASONE 4 MG/1
20 TABLET ORAL SEE ADMIN INSTRUCTIONS
Qty: 10 TABLET | Refills: 2 | Status: ON HOLD | OUTPATIENT
Start: 2017-08-23 | End: 2017-08-31

## 2017-08-23 ASSESSMENT — PAIN SCALES - GENERAL: PAINLEVEL: NO PAIN (0)

## 2017-08-23 NOTE — PROGRESS NOTES
Care Coordinator Note  Chemotherapy rescheduled to 8/25/17 per patient request.  Patient instructed to take 20 mg Dexamethasone at 12 and 6 hours prior to chemo. Patient will also take Ativan and Benadryl orally Friday morning about 7 am per Dr. Shaffer, in anticipation of chemotherapy.  Reviewed plan for constipation prevention as patient was constipated post Aloxi with first chemo attempt.  She will take Senokot and Smooth Move Tea.  Schedule for next chemotherapy cycles discussed.         Patient verbalized back understanding of the above information discussed.   Face to face time spent with patient:  10 minutes    Shanon SMITH, RN  Care Coordinator  Gynecologic Cancer   Office:  [phone]  Pager: [phone] #[phone]

## 2017-08-23 NOTE — LETTER
To Whom It MayConcern:       Hafsa CHARLEE Corona  was seen in our clinic on 8/23/2017        Patient may return to work on Oct. 2, 2017.                            Restrictions: OFF work until after first chemotherapy treatment completed. This will be reassessed at the next clinic appointment.             Provider Signature:         Jarod Shaffer MD

## 2017-08-23 NOTE — PROGRESS NOTES
Follow Up Notes on Referred Patient    Date: 2017       Dr. Morelia Ayon, NP  [address] [place] [address]       RE: Hafsa Corona  : 1955  CLIFF: 2017    Dear Dr. 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     Followup with patient today after she had a hypersensitivity reaction to Taxol.  Was not rechallenged.  She has recovered well from that.  No nausea, vomiting, fevers or chills.   Normal urinary and bowel function.  No B symptoms.         Past Medical History:    Past Medical History:   Diagnosis Date     Anxiety state, unspecified     [id number]     Attention deficit disorder without mention of hyperactivity      Chronic rhinitis      Depressive disorder, not elsewhere classified      Panic disorder without agoraphobia      Pure hypercholesterolemia     Lipitor     Tachycardia, unspecified     1999-2004     Type II or unspecified type diabetes mellitus without mention of complication, not stated as uncontrolled     diagnosed          Past Surgical History:    Past Surgical History:   Procedure Laterality Date     HYSTERECTOMY TOTAL ABDOMINAL, BILATERAL SALPINGO-OOPHORECTOMY, NODE DISSECTION, COMBINED Left 2017    Procedure: COMBINED HYSTERECTOMY TOTAL ABDOMINAL, SALPINGO-OOPHORECTOMY, NODE DISSECTION;  Exploratory Laparotomy, Left Salpingo-Oophorectomy, Cancer Staging, Total Hysterectomy, Omentectomy, Evacuation of abdominal fluid, Lymph Node Dissection  Anesthesia Block ;  Surgeon: Serena Cole MD;  Location: [facility] OR     HYSTERECTOMY, PAP NO LONGER INDICATED  2017    Laparotomy; for ovarian cancer staging     INSERT PORT VASCULAR ACCESS Right 2017    Procedure: INSERT PORT VASCULAR ACCESS;  Single Lumen Chest Power Port;  Surgeon: Stephen Mike PA-C;  Location: [facility] OR     LYMPHADENECTOMY RETROPERITONEAL Bilateral 2017    Laparotomy; pelvics &  "para-aortics; for ovarian cancer staging     OMENTECTOMY  07/07/2017    Laparotomy; for ovarian cancer staging     SALPINGO OOPHORECTOMY,R/L/KAYY Right 2008    Salpingo Oophorectomy, RT     SALPINGO OOPHORECTOMY,R/L/KAYY Left 07/07/2017    Laparotomy; for ovarian cancer staging     SURGICAL HISTORY OF -       facial surgery d/t fall in 1/2002     SURGICAL HISTORY OF -       1985 removal of breast cyst     SURGICAL HISTORY OF -   2008    endometrial ablation         Health Maintenance Due   Topic Date Due     ADVANCE DIRECTIVE PLANNING Q5 YRS  03/09/2009     MAMMO SCREEN Q2 YR (SYSTEM ASSIGNED)  12/01/2016     EYE EXAM Q1 YEAR  07/01/2017     LIPID MONITORING Q1 YEAR  09/06/2017     MICROALBUMIN Q1 YEAR  09/06/2017       Current Medications:     Current Outpatient Prescriptions   Medication Sig Dispense Refill     order for DME STERILE ADHESIVE PAD BANDAGE AT LEAST 4X4 INCH IN SIZE NEEDED FOR DAILY WOUND CARE TO LEFT LOWER LEG.  FAX TO [employer] [id number] 30 pad 1     order for DME USE 1/4 INCH  WIDTH PLAIN NU GAUZE PACKING TO DO SELF WOUND CARE OF LEFT LOWER LEG ONCE DAILY.  DIMENSIONS OF WOUND PRESENTLY ARE 2 X 2 INCH AND APPROXIMATELY 1/2 INCH DEEP.  USES 6 INCHES OF PACKING CURRENTLY FOR DRESSING CHANGE.    FAX TO [employer] [phone] 3 Bottle 2     LORazepam (ATIVAN) 1 MG tablet Take 1 tablet (1 mg) by mouth every 6 hours as needed (nausea/vomiting, anxiety or sleep) 30 tablet 1     rivaroxaban ANTICOAGULANT (XARELTO) 20 MG TABS tablet Take 1 tablet (20 mg) by mouth daily (with dinner) 30 tablet 11     traZODone (DESYREL) 50 MG tablet TAKE 1 TO 2 TABLETS(50  MG) BY MOUTH EVERY NIGHT AS NEEDED FOR SLEEP 180 tablet 1     order for DME Plain packing strip 1/4\" 1 Bottle PRN     order for DME Sterile zoey tipped applicators 1 Box PRN     polyethylene glycol (MIRALAX/GLYCOLAX) Packet Take 17 g by mouth daily 7 packet 0     Multiple Vitamins-Minerals (MULTIVITAMIN ADULT PO) Take 1 tablet by " mouth daily       vitamin B complex with vitamin C (VITAMIN  B COMPLEX) TABS tablet Take 1 tablet by mouth daily       order for DME Compression stockings 20-30 mm Hg. To be wear when directed by Hematology team and while awake for the first two years post clot. 1 each 0     CALCIUM-MAGNESIUM-VITAMIN D PO Take 2 tablets by mouth daily       Probiotic Product (PRO-BIOTIC BLEND PO) Take 1 capsule by mouth daily Enzymatic Therapy-probiotic pearls       atorvastatin (LIPITOR) 80 MG tablet Take 1 tablet (80 mg) by mouth daily 90 tablet PRN     order for DME Equipment being ordered: blood pressure monitor 1 Units 0     lisinopril (PRINIVIL/ZESTRIL) 10 MG tablet Take 1 tablet (10 mg) by mouth daily 90 tablet PRN     metFORMIN (GLUCOPHAGE-XR) 500 MG 24 hr tablet Take two tabs by mouth once daily with evening meal. (Patient taking differently: 1,000 mg daily (with dinner) Take two tabs by mouth once daily with evening meal.) 180 tablet PRN     escitalopram (LEXAPRO) 20 MG tablet Take 1 tablet (20 mg) by mouth daily 30 tablet PRN     EPINEPHrine (EPIPEN) 0.3 MG/0.3ML injection Inject 0.3 mLs (0.3 mg) into the muscle once as needed for anaphylaxis 1 each PRN     albuterol (ALBUTEROL) 108 (90 BASE) MCG/ACT inhaler Inhale 2 puffs into the lungs 4 times daily as needed for shortness of breath / dyspnea 1 Inhaler PRN     Cholecalciferol (VITAMIN D) 1000 UNIT capsule Take 2,000 Units by mouth daily        prochlorperazine (COMPAZINE) 10 MG tablet Take 1 tablet (10 mg) by mouth every 6 hours as needed (nausea/vomiting) (Patient not taking: Reported on 8/23/2017) 30 tablet 2     acetaminophen (TYLENOL) 325 MG tablet Take 2 tablets (650 mg) by mouth every 6 hours as needed for mild pain (Patient not taking: Reported on 8/23/2017) 100 tablet 0     senna-docusate (SENOKOT-S;PERICOLACE) 8.6-50 MG per tablet Take 2 tablets by mouth 2 times daily Start with 1 tablet PO BID, If no bowel movement in 24 hours, increase to 2 tablets PO BID.   "Hold for loose stools. (Patient not taking: Reported on 2017) 100 tablet 0     Lisdexamfetamine Dimesylate (VYVANSE PO) Take 50 mg by mouth daily       fluticasone (FLONASE) 50 MCG/ACT nasal spray Spray 2 sprays into both nostrils daily (Patient not taking: Reported on 2017) 48 g 1         Allergies:        Allergies   Allergen Reactions     Wasps [Hornets] Anaphylaxis     Sulfa Drugs Hives        Social History:     Social History   Substance Use Topics     Smoking status: Never Smoker     Smokeless tobacco: Never Used     Alcohol use Yes      Comment: rarely       History   Drug Use No         Family History:       Family History   Problem Relation Age of Onset     C.A.D. Father      DIABETES Father      Hypertension Father      CEREBROVASCULAR DISEASE Father      Psychotic Disorder Father      Pancreatic Cancer Father      C.A.D. Mother      Hypertension Mother      Breast Cancer Mother      Psychotic Disorder Mother      Colon Cancer Mother      CANCER Mother      Bone cancer     Prostate Problems Brother      cleared      Psychotic Disorder Sister      x2     Neurologic Disorder Sister      Psychotic Disorder Brother      x2     Depression Brother      major depressive disorder and OCD     Anxiety Disorder Brother      MENTAL ILLNESS Brother      Psychotic Disorder Maternal Grandmother      ?     Psychotic Disorder Maternal Grandfather      ?     Psychotic Disorder Paternal Grandmother      Schizophernia     Psychotic Disorder Paternal Grandfather      ?     Respiratory Paternal Grandfather       of emphysema and smoking     Psychotic Disorder Sister      Other - See Comments Sister      small kidney stone      Cancer - colorectal No family hx of          Physical Exam:     /77  Pulse 89  Temp 98.2  F (36.8  C) (Oral)  Resp 16  Ht 1.6 m (5' 2.99\")  Wt 81.3 kg (179 lb 4.8 oz)  LMP 2009  SpO2 99%  BMI 31.77 kg/m2  Body mass index is 31.77 kg/(m^2).    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     A total of 60 minutes was spent with the patient, 50 minutes of which were spent in counseling the patient and/or treatment planning.      1.  Stage IC2 clear cell carcinoma of the ovary.   2.  Paclitaxel hypersensitivity reaction.        I had a long discussion with the patient about the reasoning for hypersensitivity reaction, the rationale to rechallenge with premedication and if that is not successful, desensitization protocol in the hospital.  We did discuss again  for her treatment.  The SCOTROC trial with Taxotere as an alternative but for more advanced stage in a [facility] population potentially different pharmacogenomics and the heterogeneity of ovarian cancer on a genomic basis.  The patient agrees with this plan to proceed with the premedication and outpatient treatment with carboplatin, paclitaxel.  We will plan for 3 total cycles based on .  The patient agrees with this plan.  She is very appreciative of her care.  All questions were answered.       Jarod Shaffer MD, MS    Department of Obstetrics and Gynecology   Division of Gynecologic Oncology   [facility]  Phone: [phone]        CC  Patient Care Team:  Whit Ayon, NP as PCP - General  Karla Oswald RN as Continuity Care Coordinator (Gyn-Onc)  WHIT AYON

## 2017-08-23 NOTE — NURSING NOTE
"Oncology Rooming Note    August 23, 2017 11:29 AM   Hafsa Corona is a 62 year old female who presents for:    Chief Complaint   Patient presents with     Oncology Clinic Visit     return patient visit follow up related to Ovarian cancer, left (H)     Initial Vitals: /77  Pulse 89  Temp 98.2  F (36.8  C) (Oral)  Resp 16  Ht 1.6 m (5' 2.99\")  Wt 81.3 kg (179 lb 4.8 oz)  LMP 01/01/2009  SpO2 99%  BMI 31.77 kg/m2 Estimated body mass index is 31.77 kg/(m^2) as calculated from the following:    Height as of this encounter: 1.6 m (5' 2.99\").    Weight as of this encounter: 81.3 kg (179 lb 4.8 oz). Body surface area is 1.9 meters squared.  No Pain (0) Comment: Data Unavailable   Patient's last menstrual period was 01/01/2009.  Allergies reviewed: Yes  Medications reviewed: Yes    Medications: Medication refills not needed today.  Pharmacy name entered into [facility]:    [id number] - [place], [place] - [address] AT [facility] - [place], [place] - [address]    Clinical concerns: no concerns dr. gross was notified.    5 minutes for nursing intake (face to face time)     Esther Choi CMA              "

## 2017-08-23 NOTE — MR AVS SNAPSHOT
After Visit Summary   8/23/2017    Hafsa Corona    MRN: [id number]           Patient Information     Date Of Birth          1955        Visit Information        Provider Department      8/23/2017 11:30 AM Jarod Shaffer MD [facility]        Today's Diagnoses     Personal history of ovarian cancer    -  1       Follow-ups after your visit        Your next 10 appointments already scheduled     Aug 25, 2017  8:00 AM CDT   [facility] Lab Draw with  [facility] LAB DRAW   [facility] Lab Draw ([facility])    [address] [place] [address]   [phone]            Aug 25, 2017  8:30 AM CDT   Infusion 360 with  ONCOLOGY INFUSION, UC 19 ATC   [facility] ([facility])    [address] [place] [address]   [phone]            Sep 01, 2017  2:15 PM CDT   (Arrive by 2:00 PM)   NEW WITH ROOM with Mariam Sams GC,  2 114 CONSULT Critical access hospital [facility] ([facility])    [address] [place] [address]   [phone]            Sep 12, 2017  3:00 PM CDT   (Arrive by 2:45 PM)   NEW WITH ROOM with Lola Vasquez, PhD ,  2 114 CONSULT Critical access hospital [facility] ([facility])    [address] [place] [address]   [phone]            Sep 15, 2017  8:30 AM CDT   [facility] Lab Draw with  [facility] LAB DRAW   [facility] Lab Draw ([facility])    [address] [place] [address]   [phone]            Sep 15, 2017  9:00 AM CDT   (Arrive by 8:45 AM)   Return Active Treatment with MARLON Ogden [facility] ([facility])    [address] [place] [address]   [phone]            Sep 15, 2017 10:00 AM CDT  "  Infusion 360 with UC ONCOLOGY INFUSION   [facility] ([facility] [place])    [address] [facility] [address]  2nd Floor  [facility] [address] [phone]              Who to contact     If you have questions or need follow up information about today's clinic visit or your schedule please contact [facility] directly at [phone].  Normal or non-critical lab and imaging results will be communicated to you by MyChart, letter or phone within 4 business days after the clinic has received the results. If you do not hear from us within 7 days, please contact the clinic through [employer] or phone. If you have a critical or abnormal lab result, we will notify you by phone as soon as possible.  Submit refill requests through [employer] or call your pharmacy and they will forward the refill request to us. Please allow 3 business days for your refill to be completed.          Additional Information About Your Visit        [employer] Information     [employer] lets you send messages to your doctor, view your test results, renew your prescriptions, schedule appointments and more. To sign up, go to [email or web address] . Click on \"Log in\" on the left side of the screen, which will take you to the Welcome page. Then click on \"Sign up Now\" on the right side of the page.     You will be asked to enter the access code listed below, as well as some personal information. Please follow the directions to create your username and password.     Your access code is: [email or web address]  Expires: 10/10/2017 11:16 AM     Your access code will  in 90 days. If you need help or a new code, please call your [facility] or [phone].        Care EveryWhere ID     This is your Care EveryWhere ID. This could be used by other organizations to access your [facility] medical records  [id number]        Your Vitals Were     Pulse Temperature Respirations Height Last Period Pulse Oximetry    89 98.2 " " F (36.8  C) (Oral) 16 1.6 m (5' 2.99\") 01/01/2009 99%    BMI (Body Mass Index)                   31.77 kg/m2            Blood Pressure from Last 3 Encounters:   08/23/17 111/77   08/21/17 122/81   08/16/17 102/56    Weight from Last 3 Encounters:   08/23/17 81.3 kg (179 lb 4.8 oz)   08/21/17 82.1 kg (181 lb)   08/16/17 82.4 kg (181 lb 9.6 oz)              Today, you had the following     No orders found for display         Today's Medication Changes          These changes are accurate as of: 8/23/17 11:59 PM.  If you have any questions, ask your nurse or doctor.               Start taking these medicines.        Dose/Directions    dexamethasone 4 MG tablet   Commonly known as:  DECADRON   Used for:  Ovarian cancer, left (H), Encounter for long-term (current) use of medications   Started by:  Karla Oswald, RN        Dose:  20 mg   Take 5 tablets (20 mg) by mouth See Admin Instructions for 2 doses Take at 12 hours and 6 hours prior to Taxol   Quantity:  10 tablet   Refills:  2         These medicines have changed or have updated prescriptions.        Dose/Directions    metFORMIN 500 MG 24 hr tablet   Commonly known as:  GLUCOPHAGE-XR   This may have changed:    - how much to take  - when to take this  - additional instructions   Used for:  Type 2 diabetes mellitus without complication, without long-term current use of insulin (H)        Take two tabs by mouth once daily with evening meal.   Quantity:  180 tablet   Refills:  PRN            Where to get your medicines      These medications were sent to [facility] [place], [place] - [address]  [address] [place] [address]    Hours:  TRANSPLANT PHONE NUMBER [phone] Phone:  [phone]     dexamethasone 4 MG tablet                Primary Care Provider Office Phone # Fax #    Morelia Ayon [phone]       [address] [facility] [address]        Equal Access to Services  "    RONEY ARANDA : Hadii aad ku geoff Leach, waaxda luqadaha, qaybta kaalmada néstor, sujatha mini troyallan james tangken betts . So [facility] [phone].    ATENCIÓN: Si habla español, tiene a martínez disposición servicios gratuitos de asistencia lingüística. Llame al [phone].    We comply with applicable federal civil rights laws and [place] laws. We do not discriminate on the basis of race, color, national origin, age, disability sex, sexual orientation or gender identity.            Thank you!     Thank you for choosing [facility]  for your care. Our goal is always to provide you with excellent care. Hearing back from our patients is one way we can continue to improve our services. Please take a few minutes to complete the written survey that you may receive in the mail after your visit with us. Thank you!             Your Updated Medication List - Protect others around you: Learn how to safely use, store and throw away your medicines at www.disposemymeds.org.          This list is accurate as of: 8/23/17 11:59 PM.  Always use your most recent med list.                   Brand Name Dispense Instructions for use Diagnosis    acetaminophen 325 MG tablet    TYLENOL    100 tablet    Take 2 tablets (650 mg) by mouth every 6 hours as needed for mild pain    Cellulitis and abscess of leg, Mass of pelvis       albuterol 108 (90 BASE) MCG/ACT Inhaler    PROAIR HFA    1 Inhaler    Inhale 2 puffs into the lungs 4 times daily as needed for shortness of breath / dyspnea    Cough       atorvastatin 80 MG tablet    LIPITOR    90 tablet    Take 1 tablet (80 mg) by mouth daily    Hyperlipidemia LDL goal <100       CALCIUM-MAGNESIUM-VITAMIN D PO      Take 2 tablets by mouth daily        dexamethasone 4 MG tablet    DECADRON    10 tablet    Take 5 tablets (20 mg) by mouth See Admin Instructions for 2 doses Take at 12 hours and 6 hours prior to Taxol    Ovarian cancer, left (H), Encounter for long-term  "(current) use of medications       EPINEPHrine 0.3 MG/0.3ML injection 2-pack    EPIPEN/ADRENACLICK/or ANY BX GENERIC EQUIV    1 each    Inject 0.3 mLs (0.3 mg) into the muscle once as needed for anaphylaxis    Bee allergy status       escitalopram 20 MG tablet    LEXAPRO    30 tablet    Take 1 tablet (20 mg) by mouth daily    Major depressive disorder, recurrent episode, mild (H)       fluticasone 50 MCG/ACT spray    FLONASE    48 g    Spray 2 sprays into both nostrils daily    Chronic maxillary sinusitis       lisinopril 10 MG tablet    PRINIVIL/ZESTRIL    90 tablet    Take 1 tablet (10 mg) by mouth daily    Essential hypertension       LORazepam 1 MG tablet    ATIVAN    30 tablet    Take 1 tablet (1 mg) by mouth every 6 hours as needed (nausea/vomiting, anxiety or sleep)    Ovarian cancer, left (H), Encounter for long-term (current) use of medications       metFORMIN 500 MG 24 hr tablet    GLUCOPHAGE-XR    180 tablet    Take two tabs by mouth once daily with evening meal.    Type 2 diabetes mellitus without complication, without long-term current use of insulin (H)       MULTIVITAMIN ADULT PO      Take 1 tablet by mouth daily        order for DME     1 Units    Equipment being ordered: blood pressure monitor    Essential hypertension       * order for DME     1 each    Compression stockings 20-30 mm Hg. To be wear when directed by Hematology team and while awake for the first two years post clot.    Long-term (current) use of anticoagulants, Acute deep vein thrombosis (DVT) of left lower extremity, unspecified vein (H)       * order for DME     1 Bottle    Plain packing strip 1/4\"    Abscess of leg       * order for DME     1 Box    Sterile zoey tipped applicators    Abscess of leg       * order for DME     3 Bottle    USE 1/4 INCH  WIDTH PLAIN NU GAUZE PACKING TO DO SELF WOUND CARE OF LEFT LOWER LEG ONCE DAILY. DIMENSIONS OF WOUND PRESENTLY ARE 2 X 2 INCH AND APPROXIMATELY 1/2 INCH DEEP.  USES 6 INCHES OF " PACKING CURRENTLY FOR DRESSING CHANGE.  FAX TO [facility] [phone]    Wound of left leg       * order for DME     30 pad    STERILE ADHESIVE PAD BANDAGE AT LEAST 4X4 INCH IN SIZE NEEDED FOR DAILY WOUND CARE TO LEFT LOWER LEG. FAX TO [facility] [phone]    Leg wound, left       polyethylene glycol Packet    MIRALAX/GLYCOLAX    7 packet    Take 17 g by mouth daily    Mass of pelvis       PRO-BIOTIC BLEND PO      Take 1 capsule by mouth daily Enzymatic Therapy-probiotic pearls        prochlorperazine 10 MG tablet    COMPAZINE    30 tablet    Take 1 tablet (10 mg) by mouth every 6 hours as needed (nausea/vomiting)    Ovarian cancer, left (H), Encounter for long-term (current) use of medications       rivaroxaban ANTICOAGULANT 20 MG Tabs tablet    XARELTO    30 tablet    Take 1 tablet (20 mg) by mouth daily (with dinner)    Long-term (current) use of anticoagulants, Acute deep vein thrombosis (DVT) of left lower extremity, unspecified vein (H)       senna-docusate 8.6-50 MG per tablet    SENOKOT-S;PERICOLACE    100 tablet    Take 2 tablets by mouth 2 times daily Start with 1 tablet PO BID, If no bowel movement in 24 hours, increase to 2 tablets PO BID.  Hold for loose stools.    Mass of pelvis       traZODone 50 MG tablet    DESYREL    180 tablet    TAKE 1 TO 2 TABLETS(50  MG) BY MOUTH EVERY NIGHT AS NEEDED FOR SLEEP    Insomnia, unspecified type       vitamin B complex with vitamin C Tabs tablet      Take 1 tablet by mouth daily        vitamin D 1000 UNITS capsule      Take 2,000 Units by mouth daily        VYVANSE PO      Take 50 mg by mouth daily        * Notice:  This list has 5 medication(s) that are the same as other medications prescribed for you. Read the directions carefully, and ask your doctor or other care provider to review them with you.

## 2017-08-23 NOTE — LETTER
2017       RE: Hafsa Corona  [address]  [place] [address]     Dear Colleague,    Thank you for referring your patient, Hafsa Corona, to the [facility]. Please see a copy of my visit note below.                Follow Up Notes on Referred Patient    Date: 2017       Dr. Morelia Ayon, NP  [address]  [place] [address]       RE: Hafsa Corona  : 1955  CLIFF: 2017    Dear Dr. 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     Followup with patient today after she had a hypersensitivity reaction to Taxol.  Was not rechallenged.  She has recovered well from that.  No nausea, vomiting, fevers or chills.   Normal urinary and bowel function.  No B symptoms.         Past Medical History:    Past Medical History:   Diagnosis Date     Anxiety state, unspecified     2646-5283     Attention deficit disorder without mention of hyperactivity      Chronic rhinitis      Depressive disorder, not elsewhere classified      Panic disorder without agoraphobia      Pure hypercholesterolemia     Lipitor     Tachycardia, unspecified     1999-2004     Type II or unspecified type diabetes mellitus without mention of complication, not stated as uncontrolled     diagnosed          Past Surgical History:    Past Surgical History:   Procedure Laterality Date     HYSTERECTOMY TOTAL ABDOMINAL, BILATERAL SALPINGO-OOPHORECTOMY, NODE DISSECTION, COMBINED Left 2017    Procedure: COMBINED HYSTERECTOMY TOTAL ABDOMINAL, SALPINGO-OOPHORECTOMY, NODE DISSECTION;  Exploratory Laparotomy, Left Salpingo-Oophorectomy, Cancer Staging, Total Hysterectomy, Omentectomy, Evacuation of abdominal fluid, Lymph Node Dissection  Anesthesia Block ;  Surgeon: Serena Cole MD;  Location: [facility] OR     HYSTERECTOMY, PAP NO LONGER INDICATED  2017    Laparotomy; for ovarian cancer staging     INSERT PORT  VASCULAR ACCESS Right 8/21/2017    Procedure: INSERT PORT VASCULAR ACCESS;  Single Lumen Chest Power Port;  Surgeon: Stephen Mike PA-C;  Location: [facility] OR     LYMPHADENECTOMY RETROPERITONEAL Bilateral 07/07/2017    Laparotomy; pelvics & para-aortics; for ovarian cancer staging     OMENTECTOMY  07/07/2017    Laparotomy; for ovarian cancer staging     SALPINGO OOPHORECTOMY,R/L/KAYY Right 2008    Salpingo Oophorectomy, RT     SALPINGO OOPHORECTOMY,R/L/KAYY Left 07/07/2017    Laparotomy; for ovarian cancer staging     SURGICAL HISTORY OF -       facial surgery d/t fall in 1/2002     SURGICAL HISTORY OF -       1985 removal of breast cyst     SURGICAL HISTORY OF -   2008    endometrial ablation         Health Maintenance Due   Topic Date Due     ADVANCE DIRECTIVE PLANNING Q5 YRS  03/09/2009     MAMMO SCREEN Q2 YR (SYSTEM ASSIGNED)  12/01/2016     EYE EXAM Q1 YEAR  07/01/2017     LIPID MONITORING Q1 YEAR  09/06/2017     MICROALBUMIN Q1 YEAR  09/06/2017       Current Medications:     Current Outpatient Prescriptions   Medication Sig Dispense Refill     order for DME STERILE ADHESIVE PAD BANDAGE AT LEAST 4X4 INCH IN SIZE NEEDED FOR DAILY WOUND CARE TO LEFT LOWER LEG.  FAX TO [facility] [phone] 30 pad 1     order for DME USE 1/4 INCH  WIDTH PLAIN NU GAUZE PACKING TO DO SELF WOUND CARE OF LEFT LOWER LEG ONCE DAILY.  DIMENSIONS OF WOUND PRESENTLY ARE 2 X 2 INCH AND APPROXIMATELY 1/2 INCH DEEP.  USES 6 INCHES OF PACKING CURRENTLY FOR DRESSING CHANGE.    FAX TO [facility] [phone] 3 Bottle 2     LORazepam (ATIVAN) 1 MG tablet Take 1 tablet (1 mg) by mouth every 6 hours as needed (nausea/vomiting, anxiety or sleep) 30 tablet 1     rivaroxaban ANTICOAGULANT (XARELTO) 20 MG TABS tablet Take 1 tablet (20 mg) by mouth daily (with dinner) 30 tablet 11     traZODone (DESYREL) 50 MG tablet TAKE 1 TO 2 TABLETS(50  MG) BY MOUTH EVERY NIGHT AS NEEDED FOR SLEEP 180 tablet 1     order for DME Plain packing  "strip 1/4\" 1 Bottle PRN     order for DME Sterile zoey tipped applicators 1 Box PRN     polyethylene glycol (MIRALAX/GLYCOLAX) Packet Take 17 g by mouth daily 7 packet 0     Multiple Vitamins-Minerals (MULTIVITAMIN ADULT PO) Take 1 tablet by mouth daily       vitamin B complex with vitamin C (VITAMIN  B COMPLEX) TABS tablet Take 1 tablet by mouth daily       order for DME Compression stockings 20-30 mm Hg. To be wear when directed by Hematology team and while awake for the first two years post clot. 1 each 0     CALCIUM-MAGNESIUM-VITAMIN D PO Take 2 tablets by mouth daily       Probiotic Product (PRO-BIOTIC BLEND PO) Take 1 capsule by mouth daily Enzymatic Therapy-probiotic pearls       atorvastatin (LIPITOR) 80 MG tablet Take 1 tablet (80 mg) by mouth daily 90 tablet PRN     order for DME Equipment being ordered: blood pressure monitor 1 Units 0     lisinopril (PRINIVIL/ZESTRIL) 10 MG tablet Take 1 tablet (10 mg) by mouth daily 90 tablet PRN     metFORMIN (GLUCOPHAGE-XR) 500 MG 24 hr tablet Take two tabs by mouth once daily with evening meal. (Patient taking differently: 1,000 mg daily (with dinner) Take two tabs by mouth once daily with evening meal.) 180 tablet PRN     escitalopram (LEXAPRO) 20 MG tablet Take 1 tablet (20 mg) by mouth daily 30 tablet PRN     EPINEPHrine (EPIPEN) 0.3 MG/0.3ML injection Inject 0.3 mLs (0.3 mg) into the muscle once as needed for anaphylaxis 1 each PRN     albuterol (ALBUTEROL) 108 (90 BASE) MCG/ACT inhaler Inhale 2 puffs into the lungs 4 times daily as needed for shortness of breath / dyspnea 1 Inhaler PRN     Cholecalciferol (VITAMIN D) 1000 UNIT capsule Take 2,000 Units by mouth daily        prochlorperazine (COMPAZINE) 10 MG tablet Take 1 tablet (10 mg) by mouth every 6 hours as needed (nausea/vomiting) (Patient not taking: Reported on 8/23/2017) 30 tablet 2     acetaminophen (TYLENOL) 325 MG tablet Take 2 tablets (650 mg) by mouth every 6 hours as needed for mild pain (Patient " not taking: Reported on 2017) 100 tablet 0     senna-docusate (SENOKOT-S;PERICOLACE) 8.6-50 MG per tablet Take 2 tablets by mouth 2 times daily Start with 1 tablet PO BID, If no bowel movement in 24 hours, increase to 2 tablets PO BID.  Hold for loose stools. (Patient not taking: Reported on 2017) 100 tablet 0     Lisdexamfetamine Dimesylate (VYVANSE PO) Take 50 mg by mouth daily       fluticasone (FLONASE) 50 MCG/ACT nasal spray Spray 2 sprays into both nostrils daily (Patient not taking: Reported on 2017) 48 g 1         Allergies:        Allergies   Allergen Reactions     Wasps [Hornets] Anaphylaxis     Sulfa Drugs Hives        Social History:     Social History   Substance Use Topics     Smoking status: Never Smoker     Smokeless tobacco: Never Used     Alcohol use Yes      Comment: rarely       History   Drug Use No         Family History:       Family History   Problem Relation Age of Onset     C.A.D. Father      DIABETES Father      Hypertension Father      CEREBROVASCULAR DISEASE Father      Psychotic Disorder Father      Pancreatic Cancer Father      C.A.D. Mother      Hypertension Mother      Breast Cancer Mother      Psychotic Disorder Mother      Colon Cancer Mother      CANCER Mother      Bone cancer     Prostate Problems Brother      cleared      Psychotic Disorder Sister      x2     Neurologic Disorder Sister      Psychotic Disorder Brother      x2     Depression Brother      major depressive disorder and OCD     Anxiety Disorder Brother      MENTAL ILLNESS Brother      Psychotic Disorder Maternal Grandmother      ?     Psychotic Disorder Maternal Grandfather      ?     Psychotic Disorder Paternal Grandmother      Schizophernia     Psychotic Disorder Paternal Grandfather      ?     Respiratory Paternal Grandfather       of emphysema and smoking     Psychotic Disorder Sister      Other - See Comments Sister      small kidney stone      Cancer - colorectal No family hx of   "        Physical Exam:     /77  Pulse 89  Temp 98.2  F (36.8  C) (Oral)  Resp 16  Ht 1.6 m (5' 2.99\")  Wt 81.3 kg (179 lb 4.8 oz)  LMP 01/01/2009  SpO2 99%  BMI 31.77 kg/m2  Body mass index is 31.77 kg/(m^2).    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     A total of 60 minutes was spent with the patient, 50 minutes of which were spent in counseling the patient and/or treatment planning.      1.  Stage IC2 clear cell carcinoma of the ovary.   2.  Paclitaxel hypersensitivity reaction.        I had a long discussion with the patient about the reasoning for hypersensitivity reaction, the rationale to rechallenge with premedication and if that is not successful, desensitization protocol in the hospital.  We did discuss again  for her treatment.  The SCOTROC trial with Taxotere as an alternative but for more advanced stage in a [facility] population potentially different pharmacogenomics and the heterogeneity of ovarian cancer on a genomic basis.  The patient agrees with this plan to proceed with the premedication and outpatient treatment with carboplatin, paclitaxel.  We will plan for 3 total cycles based on .  The patient agrees with this plan.  She is very appreciative of her care.  All questions were answered.       Jarod Shaffer MD, MS    Department of Obstetrics and Gynecology   Division of Gynecologic Oncology   [facility]  Phone: [phone]        CC  Patient Care Team:  Morelia Ayon NP as PCP - General          "

## 2017-08-24 ENCOUNTER — TELEPHONE (OUTPATIENT)
Dept: ONCOLOGY | Facility: CLINIC | Age: 62
End: 2017-08-24

## 2017-08-24 ENCOUNTER — CARE COORDINATION (OUTPATIENT)
Dept: ONCOLOGY | Facility: CLINIC | Age: 62
End: 2017-08-24

## 2017-08-24 ENCOUNTER — TELEPHONE (OUTPATIENT)
Dept: FAMILY MEDICINE | Facility: CLINIC | Age: 62
End: 2017-08-24

## 2017-08-24 NOTE — PROGRESS NOTES
Care Coordinator Note  Returned call from patient, questions about length of time she will be in infusion tomorrow, antiemetics post chemo and form regarding leave of absence from her employer answered.  Patient states she will take Dexamethasone as directed and also plans to take Ativan at 7 am tomorrow.      Patient verbalized back understanding of the above information discussed.     Shanon SMITH, RN  Care Coordinator  Gynecologic Cancer   Office:  [phone]  Pager: [phone] #[phone]

## 2017-08-24 NOTE — TELEPHONE ENCOUNTER
Reason for Call:  Other call back    Detailed comments: Please see TE from 8/22. Pt wanted to speak to a triage nurse regarding medical supplies and her progress on her wound care. Pt also states she needs antiseptic wipes (preferably moistened, not dry). Please follow up with pt thanks!    Phone Number Patient can be reached at: Home number on file [phone] (home)    Best Time: anytime    Can we leave a detailed message on this number? YES    Call taken on 8/24/2017 at 2:37 PM by Francesca Page

## 2017-08-24 NOTE — TELEPHONE ENCOUNTER
Oncology Nutrition:  Reason for Contact:  Patient was contacted by phone due to reporting concerns about her weight loss (8) on the Oncology Distress Screening tool.   Called Hafsa today.  She reports that he has lost ~26 lbs since July 7th when she had surgery.  She reports that she is pleased with her weight loss, however, more recently has been concerned as her weight loss has not stabilized going into chemo (carbo/taxol).  She starts chemo tomorrow.    She reports that she has been focused on protein (consuming protein at each meal).  She has also been maximizing calories but admits that the calories are coming mostly from sweets.    She expresses her appreciation for the phone call and reports that 'it's timely'.  She is not interested in setting up an appointment with RD at this time but has requested RD to call back in 3-4 weeks.  She has RD contact info and will call RD as needed.     Iris Garces RD, LD  Oncology Dietitian  [phone]  Pager: [phone]

## 2017-08-25 ENCOUNTER — APPOINTMENT (OUTPATIENT)
Dept: LAB | Facility: CLINIC | Age: 62
End: 2017-08-25
Attending: OBSTETRICS & GYNECOLOGY
Payer: COMMERCIAL

## 2017-08-25 ENCOUNTER — INFUSION THERAPY VISIT (OUTPATIENT)
Dept: ONCOLOGY | Facility: CLINIC | Age: 62
End: 2017-08-25
Attending: OBSTETRICS & GYNECOLOGY
Payer: COMMERCIAL

## 2017-08-25 VITALS
RESPIRATION RATE: 24 BRPM | TEMPERATURE: 98.9 F | SYSTOLIC BLOOD PRESSURE: 130 MMHG | DIASTOLIC BLOOD PRESSURE: 80 MMHG | HEART RATE: 89 BPM | BODY MASS INDEX: 32.34 KG/M2 | WEIGHT: 182.5 LBS | OXYGEN SATURATION: 97 %

## 2017-08-25 DIAGNOSIS — Z85.43 PERSONAL HISTORY OF OVARIAN CANCER: Primary | ICD-10-CM

## 2017-08-25 DIAGNOSIS — C56.2 OVARIAN CANCER, LEFT (H): ICD-10-CM

## 2017-08-25 DIAGNOSIS — Z79.899 ENCOUNTER FOR LONG-TERM (CURRENT) USE OF MEDICATIONS: ICD-10-CM

## 2017-08-25 LAB
ALBUMIN SERPL-MCNC: 3.6 G/DL (ref 3.4–5)
ALP SERPL-CCNC: 95 U/L (ref 40–150)
ALT SERPL W P-5'-P-CCNC: 17 U/L (ref 0–50)
ANION GAP SERPL CALCULATED.3IONS-SCNC: 8 MMOL/L (ref 3–14)
AST SERPL W P-5'-P-CCNC: 14 U/L (ref 0–45)
BASOPHILS # BLD AUTO: 0 10E9/L (ref 0–0.2)
BASOPHILS NFR BLD AUTO: 0.2 %
BILIRUB SERPL-MCNC: 0.4 MG/DL (ref 0.2–1.3)
BUN SERPL-MCNC: 26 MG/DL (ref 7–30)
CALCIUM SERPL-MCNC: 9.4 MG/DL (ref 8.5–10.1)
CHLORIDE SERPL-SCNC: 107 MMOL/L (ref 94–109)
CO2 SERPL-SCNC: 25 MMOL/L (ref 20–32)
CREAT SERPL-MCNC: 0.72 MG/DL (ref 0.52–1.04)
DIFFERENTIAL METHOD BLD: ABNORMAL
EOSINOPHIL # BLD AUTO: 0 10E9/L (ref 0–0.7)
EOSINOPHIL NFR BLD AUTO: 0 %
ERYTHROCYTE [DISTWIDTH] IN BLOOD BY AUTOMATED COUNT: 14.6 % (ref 10–15)
GFR SERPL CREATININE-BSD FRML MDRD: 82 ML/MIN/1.7M2
GLUCOSE SERPL-MCNC: 182 MG/DL (ref 70–99)
HCT VFR BLD AUTO: 32.9 % (ref 35–47)
HGB BLD-MCNC: 10.7 G/DL (ref 11.7–15.7)
IMM GRANULOCYTES # BLD: 0.1 10E9/L (ref 0–0.4)
IMM GRANULOCYTES NFR BLD: 0.8 %
LYMPHOCYTES # BLD AUTO: 1.4 10E9/L (ref 0.8–5.3)
LYMPHOCYTES NFR BLD AUTO: 22.5 %
MAGNESIUM SERPL-MCNC: 2.2 MG/DL (ref 1.6–2.3)
MCH RBC QN AUTO: 27.4 PG (ref 26.5–33)
MCHC RBC AUTO-ENTMCNC: 32.5 G/DL (ref 31.5–36.5)
MCV RBC AUTO: 84 FL (ref 78–100)
MONOCYTES # BLD AUTO: 0 10E9/L (ref 0–1.3)
MONOCYTES NFR BLD AUTO: 0.5 %
NEUTROPHILS # BLD AUTO: 4.8 10E9/L (ref 1.6–8.3)
NEUTROPHILS NFR BLD AUTO: 76 %
NRBC # BLD AUTO: 0 10*3/UL
NRBC BLD AUTO-RTO: 0 /100
PLATELET # BLD AUTO: 291 10E9/L (ref 150–450)
POTASSIUM SERPL-SCNC: 4.4 MMOL/L (ref 3.4–5.3)
PROT SERPL-MCNC: 7.6 G/DL (ref 6.8–8.8)
RBC # BLD AUTO: 3.91 10E12/L (ref 3.8–5.2)
SODIUM SERPL-SCNC: 140 MMOL/L (ref 133–144)
WBC # BLD AUTO: 6.3 10E9/L (ref 4–11)

## 2017-08-25 PROCEDURE — 96375 TX/PRO/DX INJ NEW DRUG ADDON: CPT

## 2017-08-25 PROCEDURE — 85025 COMPLETE CBC W/AUTO DIFF WBC: CPT | Performed by: NURSE PRACTITIONER

## 2017-08-25 PROCEDURE — 96409 CHEMO IV PUSH SNGL DRUG: CPT

## 2017-08-25 PROCEDURE — 25000125 ZZHC RX 250: Mod: ZF | Performed by: OBSTETRICS & GYNECOLOGY

## 2017-08-25 PROCEDURE — 83735 ASSAY OF MAGNESIUM: CPT | Performed by: NURSE PRACTITIONER

## 2017-08-25 PROCEDURE — 96376 TX/PRO/DX INJ SAME DRUG ADON: CPT

## 2017-08-25 PROCEDURE — 80053 COMPREHEN METABOLIC PANEL: CPT | Performed by: NURSE PRACTITIONER

## 2017-08-25 PROCEDURE — 96361 HYDRATE IV INFUSION ADD-ON: CPT

## 2017-08-25 PROCEDURE — S0028 INJECTION, FAMOTIDINE, 20 MG: HCPCS | Mod: ZF | Performed by: OBSTETRICS & GYNECOLOGY

## 2017-08-25 PROCEDURE — 25000128 H RX IP 250 OP 636: Mod: ZF | Performed by: OBSTETRICS & GYNECOLOGY

## 2017-08-25 PROCEDURE — 25000128 H RX IP 250 OP 636: Mod: ZF

## 2017-08-25 RX ORDER — MEPERIDINE HYDROCHLORIDE 25 MG/ML
25 INJECTION INTRAMUSCULAR; INTRAVENOUS; SUBCUTANEOUS EVERY 30 MIN PRN
Status: DISCONTINUED | OUTPATIENT
Start: 2017-08-25 | End: 2017-08-25 | Stop reason: HOSPADM

## 2017-08-25 RX ORDER — ALBUTEROL SULFATE 0.83 MG/ML
2.5 SOLUTION RESPIRATORY (INHALATION)
Status: DISCONTINUED | OUTPATIENT
Start: 2017-08-25 | End: 2017-08-25 | Stop reason: HOSPADM

## 2017-08-25 RX ORDER — METHYLPREDNISOLONE SODIUM SUCCINATE 125 MG/2ML
125 INJECTION, POWDER, LYOPHILIZED, FOR SOLUTION INTRAMUSCULAR; INTRAVENOUS
Status: DISCONTINUED | OUTPATIENT
Start: 2017-08-25 | End: 2017-08-25 | Stop reason: HOSPADM

## 2017-08-25 RX ORDER — EPINEPHRINE 0.3 MG/.3ML
INJECTION SUBCUTANEOUS
Status: DISCONTINUED
Start: 2017-08-25 | End: 2017-08-25 | Stop reason: WASHOUT

## 2017-08-25 RX ORDER — DIPHENHYDRAMINE HYDROCHLORIDE 50 MG/ML
50 INJECTION INTRAMUSCULAR; INTRAVENOUS
Status: COMPLETED | OUTPATIENT
Start: 2017-08-25 | End: 2017-08-25

## 2017-08-25 RX ORDER — HEPARIN SODIUM (PORCINE) LOCK FLUSH IV SOLN 100 UNIT/ML 100 UNIT/ML
5 SOLUTION INTRAVENOUS EVERY 8 HOURS
Status: DISCONTINUED | OUTPATIENT
Start: 2017-08-25 | End: 2017-08-25 | Stop reason: HOSPADM

## 2017-08-25 RX ORDER — PALONOSETRON 0.05 MG/ML
0.25 INJECTION, SOLUTION INTRAVENOUS ONCE
Status: COMPLETED | OUTPATIENT
Start: 2017-08-25 | End: 2017-08-25

## 2017-08-25 RX ORDER — EPINEPHRINE 0.3 MG/.3ML
0.3 INJECTION SUBCUTANEOUS EVERY 5 MIN PRN
Status: DISCONTINUED | OUTPATIENT
Start: 2017-08-25 | End: 2017-08-25 | Stop reason: HOSPADM

## 2017-08-25 RX ORDER — EPINEPHRINE 1 MG/ML
0.3 INJECTION INTRAMUSCULAR; INTRAVENOUS; SUBCUTANEOUS EVERY 5 MIN PRN
Status: DISCONTINUED | OUTPATIENT
Start: 2017-08-25 | End: 2017-08-25

## 2017-08-25 RX ORDER — LORAZEPAM 2 MG/ML
INJECTION INTRAMUSCULAR
Status: COMPLETED
Start: 2017-08-25 | End: 2017-08-25

## 2017-08-25 RX ORDER — SODIUM CHLORIDE 9 MG/ML
1000 INJECTION, SOLUTION INTRAVENOUS CONTINUOUS PRN
Status: DISCONTINUED | OUTPATIENT
Start: 2017-08-25 | End: 2017-08-25 | Stop reason: HOSPADM

## 2017-08-25 RX ORDER — ALBUTEROL SULFATE 0.83 MG/ML
1 SOLUTION RESPIRATORY (INHALATION)
Status: DISCONTINUED | OUTPATIENT
Start: 2017-08-25 | End: 2017-08-25 | Stop reason: HOSPADM

## 2017-08-25 RX ADMIN — DEXAMETHASONE SODIUM PHOSPHATE 20 MG: 10 INJECTION, SOLUTION INTRAMUSCULAR; INTRAVENOUS at 08:48

## 2017-08-25 RX ADMIN — DIPHENHYDRAMINE HYDROCHLORIDE 50 MG: 50 INJECTION INTRAMUSCULAR; INTRAVENOUS at 10:06

## 2017-08-25 RX ADMIN — PALONOSETRON HYDROCHLORIDE 0.25 MG: 0.25 INJECTION INTRAVENOUS at 08:59

## 2017-08-25 RX ADMIN — FAMOTIDINE 40 MG: 10 INJECTION INTRAVENOUS at 09:18

## 2017-08-25 RX ADMIN — LORAZEPAM 0.5 MG: 2 INJECTION INTRAMUSCULAR; INTRAVENOUS at 10:12

## 2017-08-25 RX ADMIN — SODIUM CHLORIDE, PRESERVATIVE FREE 5 ML: 5 INJECTION INTRAVENOUS at 15:17

## 2017-08-25 RX ADMIN — METHYLPREDNISOLONE SODIUM SUCCINATE 125 MG: 125 INJECTION, POWDER, FOR SOLUTION INTRAMUSCULAR; INTRAVENOUS at 10:07

## 2017-08-25 RX ADMIN — SODIUM CHLORIDE 250 ML: 9 INJECTION, SOLUTION INTRAVENOUS at 08:47

## 2017-08-25 RX ADMIN — PACLITAXEL 345 MG: 6 INJECTION, SOLUTION INTRAVENOUS at 09:51

## 2017-08-25 RX ADMIN — SODIUM CHLORIDE, PRESERVATIVE FREE 5 ML: 5 INJECTION INTRAVENOUS at 08:11

## 2017-08-25 RX ADMIN — SODIUM CHLORIDE 1000 ML: 9 INJECTION, SOLUTION INTRAVENOUS at 10:07

## 2017-08-25 RX ADMIN — DIPHENHYDRAMINE HYDROCHLORIDE 50 MG: 50 INJECTION INTRAMUSCULAR; INTRAVENOUS at 09:06

## 2017-08-25 ASSESSMENT — PAIN SCALES - GENERAL: PAINLEVEL: MILD PAIN (2)

## 2017-08-25 NOTE — PROGRESS NOTES
Infusion Nursing Note:  Hafsa Corona presents today for rechallenge of Day 1 Cycle 1 Taxol Carboplatin.    Patient seen by provider today: No    Note: Hafsa presents today feeling anxious about her chemotherapy today. She reports taking her dexamethasone prep at 8:30 pm last night and 2:30 am today, as well as oral Ativan 0.5 mg and Benadryl 25 mg this morning at 7:15 am.      Intravenous Access:  Implanted Port.    Treatment Conditions:  Lab Results   Component Value Date    HGB 10.7 08/25/2017     Lab Results   Component Value Date    WBC 6.3 08/25/2017      Lab Results   Component Value Date    ANEU 4.8 08/25/2017     Lab Results   Component Value Date     08/25/2017      Lab Results   Component Value Date     08/25/2017                   Lab Results   Component Value Date    POTASSIUM 4.4 08/25/2017           Lab Results   Component Value Date    MAG 2.2 08/25/2017            Lab Results   Component Value Date    CR 0.72 08/25/2017                   Lab Results   Component Value Date    COLE 9.4 08/25/2017                Lab Results   Component Value Date    BILITOTAL 0.4 08/25/2017           Lab Results   Component Value Date    ALBUMIN 3.6 08/25/2017                    Lab Results   Component Value Date    ALT 17 08/25/2017           Lab Results   Component Value Date    AST 14 08/25/2017     Results reviewed, labs MET treatment parameters, ok to proceed with treatment.    Post Infusion Assessment:  Taxol started at 50 mL/hr to titrate slowly to goal rate of 202 mL/hr. Patient received 12 mL of Taxol when she reported feeling tightness in her chest. Taxol was immediately stopped and hypersensitivity protocol initiated. Patient reported feeling back pain that wrapped around her low and middle back around to her lower ribs. Her chest appeared flushed/red.  BP elevated to 160/99. Patient denied nausea, difficulty breathing, or tightness in throat or swelling of mouth/lips/throat. Denied itching.  Symptoms resolved quickly upon receiving IV rescue medications.   TORB @ 1130 Emely Chan RNCC/Jina Bowling RN: Will not rechallenge Taxol today. Plan for patient to rechallenge Taxol inpatient next week if possible.   Patient observed for 90 minutes post reaction and reported feeling back to baseline.  Access discontinued per protocol.     Discharge Plan:   Discharge instructions reviewed with: Patient and friend.  Patient verbalized understanding of discharge instructions and all questions answered.  Copy of AVS reviewed with patient and family.  Patient will discuss plan for future appointments and chemotherapy with Dr. Shaffer.   Patient discharged in stable condition accompanied by: friend.  Departure Mode: Wheelchair.  Face to Face time: 15.      Drug Administration Record    Drug Name: Ativan  Dose: 0.5 mg  Route Administered: IV  NDC#: 6638-1304-26  Amount of waste (mL): 0.75  Reason for waste: single use vial    Jina Bwoling, RN

## 2017-08-25 NOTE — PATIENT INSTRUCTIONS
Contact Numbers  [facility]: [phone]  (Choose Option 3 for triage RN)  After Hours: [phone]    Call triage with chills and/or temperature greater than or equal to 100.5, uncontrolled nausea/vomiting, diarrhea, constipation, dizziness, shortness of breath, chest pain, bleeding, unexplained bruising, or any new/concerning symptoms, questions/concerns.     If after hours, weekends, or holidays, call the main clinic number. Calls will be forwarded to the hospital , please ask for the adult oncology doctor on call.     If you are having any concerning symptoms or wish to speak to a provider before your next infusion visit, please call your care coordinator or triage to notify them so we can adequately serve you.     If you need a refill on a narcotic prescription, please call triage or your care coordinator before your infusion appointment.             August 2017 Sunday Monday Tuesday Wednesday Thursday Friday Saturday             1     LYMPHEDEMA EVALUATION    1:30 PM   (75 min.)   Yesenia Camacho, PT   [facility] Lymphedema 2     LONG    2:15 PM   (30 min.)   Morelia Ayon, NP   [facility]     CT CHEST WWO    3:45 PM   (20 min.)   [email or web address]   [facility] CT 3     LAB    3:45 PM   (15 min.)    LAB   [facility] 4     5       6     7     8     LAB    3:00 PM   (15 min.)    LAB   [facility] 9     10     11     LAB   12:45 PM   (15 min.)    LAB   [facility]     US LWR EXT VENOUS DUPLEX LEFT    1:00 PM   (60 min.)   [email or web address]   [facility] US     RETURN CLOTTING DISORDER    1:45 PM   (30 min.)   William Villarreal MD   [facility] Bleeding and Clotting 12       13     14     15     16     [facility] DRAW    7:30 AM   (15 min.)    [facility] DRAW   [facility] Lab Draw     [facility] RETURN ACTIVE TREATMENT    7:45 AM   (30 min.)   Concepcion Kohler APRN CNP   [facility]  ONC INFUSION 360    9:00 AM   (360 min.)   [facility] ONCOLOGY INFUSION   [facility]     UMP NEW   12:15 PM   (30 min.)   Pavan Gallegos LMFT   [facility] 17     18     19       20     21     Outpatient Visit    7:21 AM   [facility]     IR CHEST PORT PLACEMENT >5 YRS    7:30 AM   (75 min.)   [id number]   [facility] ASC Imaging     INSERT PORT VASCULAR ACCESS    9:00 AM   Stephen Mike PA-C    OR 22     23     [facility] RETURN   11:15 AM   (20 min.)   Jarod Shaffer MD   [facility] 24     25     [facility] DRAW    8:00 AM   (15 min.)    [facility] DRAW   [facility] Lab Draw     [facility] ONC INFUSION 360    8:30 AM   (360 min.)    ONCOLOGY INFUSION   [facility] 26       27     28     29     30     31 September 2017 Sunday Monday Tuesday Wednesday Thursday Friday Saturday                            1     [facility] NEW WITH ROOM    2:00 PM   (75 min.)   Mariam Sams GC   [facility] 2       3     4     5     6     7     8     9       10     11     12     [facility] NEW WITH ROOM    2:45 PM   (60 min.)   Lola Vasquez, PhD LP   [facility] 13     14     15     P [facility] DRAW    8:30 AM   (15 min.)    [facility] DRAW   [facility] Lab Draw     [facility] RETURN ACTIVE TREATMENT    8:45 AM   (40 min.)   Augustina Noriega APRN CNP   [facility] ONC INFUSION 360   10:00 AM   (360 min.)    ONCOLOGY INFUSION   [facility] 16       17     18     19     20     21     NEW   11:00 AM   (60 min.)   Lucina Reyes, [facility] 22     23       24     25     26     27     28     29     30                  Lab Results:  Recent Results (from the past 12 hour(s))   CBC with platelets differential    Collection Time: 08/25/17  8:12 AM   Result Value Ref Range    WBC 6.3 4.0  - 11.0 10e9/L    RBC Count 3.91 3.8 - 5.2 10e12/L    Hemoglobin 10.7 (L) 11.7 - 15.7 g/dL    Hematocrit 32.9 (L) 35.0 - 47.0 %    MCV 84 78 - 100 fl    MCH 27.4 26.5 - 33.0 pg    MCHC 32.5 31.5 - 36.5 g/dL    RDW 14.6 10.0 - 15.0 %    Platelet Count 291 150 - 450 10e9/L    Diff Method Automated Method     % Neutrophils 76.0 %    % Lymphocytes 22.5 %    % Monocytes 0.5 %    % Eosinophils 0.0 %    % Basophils 0.2 %    % Immature Granulocytes 0.8 %    Nucleated RBCs 0 0 /100    Absolute Neutrophil 4.8 1.6 - 8.3 10e9/L    Absolute Lymphocytes 1.4 0.8 - 5.3 10e9/L    Absolute Monocytes 0.0 0.0 - 1.3 10e9/L    Absolute Eosinophils 0.0 0.0 - 0.7 10e9/L    Absolute Basophils 0.0 0.0 - 0.2 10e9/L    Abs Immature Granulocytes 0.1 0 - 0.4 10e9/L    Absolute Nucleated RBC 0.0    CMP - Comprehensive Metabolic Panel    Collection Time: 08/25/17  8:12 AM   Result Value Ref Range    Sodium 140 133 - 144 mmol/L    Potassium 4.4 3.4 - 5.3 mmol/L    Chloride 107 94 - 109 mmol/L    Carbon Dioxide 25 20 - 32 mmol/L    Anion Gap 8 3 - 14 mmol/L    Glucose 182 (H) 70 - 99 mg/dL    Urea Nitrogen 26 7 - 30 mg/dL    Creatinine 0.72 0.52 - 1.04 mg/dL    GFR Estimate 82 >60 mL/min/1.7m2    GFR Estimate If Black >90 >60 mL/min/1.7m2    Calcium 9.4 8.5 - 10.1 mg/dL    Bilirubin Total 0.4 0.2 - 1.3 mg/dL    Albumin 3.6 3.4 - 5.0 g/dL    Protein Total 7.6 6.8 - 8.8 g/dL    Alkaline Phosphatase 95 40 - 150 U/L    ALT 17 0 - 50 U/L    AST 14 0 - 45 U/L   Magnesium    Collection Time: 08/25/17  8:12 AM   Result Value Ref Range    Magnesium 2.2 1.6 - 2.3 mg/dL

## 2017-08-25 NOTE — NURSING NOTE
Chief Complaint   Patient presents with     Port Draw     accessed with power needle for labsa nd infusion, heparin locked, vitals checked

## 2017-08-25 NOTE — MR AVS SNAPSHOT
After Visit Summary   8/25/2017    Hafsa Corona    MRN: [id number]           Patient Information     Date Of Birth          1955        Visit Information        Provider Department      8/25/2017 8:30 AM UC 19 ATC;  [facility]Y INFUSION [facility], located within [facility]        Today's Diagnoses     Personal history of ovarian cancer    -  1    Ovarian cancer, left (H)        Encounter for long-term (current) use of medications          Care Instructions    Contact Numbers  [facility]: [phone]  (Choose Option 3 for triage RN)  After Hours: [phone]    Call triage with chills and/or temperature greater than or equal to 100.5, uncontrolled nausea/vomiting, diarrhea, constipation, dizziness, shortness of breath, chest pain, bleeding, unexplained bruising, or any new/concerning symptoms, questions/concerns.     If after hours, weekends, or holidays, call the main clinic number. Calls will be forwarded to the hospital , please ask for the adult oncology doctor on call.     If you are having any concerning symptoms or wish to speak to a provider before your next infusion visit, please call your care coordinator or triage to notify them so we can adequately serve you.     If you need a refill on a narcotic prescription, please call triage or your care coordinator before your infusion appointment.             August 2017 Sunday Monday Tuesday Wednesday Thursday Friday Saturday             1     LYMPHEDEMA EVALUATION    1:30 PM   (75 min.)   Yesenia Camacho, PT   [facility] [place] Lymphedema 2     LONG    2:15 PM   (30 min.)   Morelia Ayon, NP   [facility]     CT CHEST WWO    3:45 PM   (20 min.)   UCCT1   [facility] CT 3     LAB    3:45 PM   (15 min.)   HP LAB   [facility] 4     5       6     7     8     LAB    3:00 PM   (15 min.)   HP LAB   [facility] 9     10     11     LAB   12:45 PM   (15 min.)     LAB   [facility]     US LWR EXT VENOUS DUPLEX LEFT    1:00 PM   (60 min.)   UCUSV1   [facility] US     RETURN CLOTTING DISORDER    1:45 PM   (30 min.)   William Villarreal MD   [facility] Bleeding and Clotting 12       13     14     15     16     [facility] DRAW    7:30 AM   (15 min.)    [facility] DRAW   [facility] Lab Draw     [facility] RETURN ACTIVE TREATMENT    7:45 AM   (30 min.)   Concepcion Kohler APRN CNP   [facility]     UM ONC INFUSION 360    9:00 AM   (360 min.)    ONCOLOGY INFUSION   [facility]     UMP NEW   12:15 PM   (30 min.)   Pavan Gallegos LMFT   [facility] 17     18     19       20     21     Outpatient Visit    7:21 AM   [facility]     IR CHEST PORT PLACEMENT >5 YRS    7:30 AM   (75 min.)   [id number]   [facility] Imaging     INSERT PORT VASCULAR ACCESS    9:00 AM   Stephen Mike PA-C    OR 22     23     [facility] RETURN   11:15 AM   (20 min.)   Jarod Shaffer MD   [facility] 24     25     [facility] DRAW    8:00 AM   (15 min.)    [facility] DRAW   [facility] Lab Draw     [facility] ONC INFUSION 360    8:30 AM   (360 min.)    ONCOLOGY INFUSION   [facility] 26       27     28     29     30     31 September 2017 Sunday Monday Tuesday Wednesday Thursday Friday Saturday                            1     [facility] NEW WITH ROOM    2:00 PM   (75 min.)   Mariam Sams GC   [facility] 2       3     4     5     6     7     8     9       10     11     12     [facility] NEW WITH ROOM    2:45 PM   (60 min.)   Lola Vasquez, PhD LP   [facility] 13     14     15     [facility] DRAW    8:30 AM   (15 min.)    [facility] DRAW   [facility] Lab Draw     [facility] RETURN ACTIVE TREATMENT    8:45 AM   (40 min.)   Augustina Noriega APRN CNP   [facility]  ONC INFUSION 360   10:00 AM   (360 min.)    ONCOLOGY INFUSION   [facility] 16       17     18     19     20     21     NEW   11:00 AM   (60 min.)   Lucina Reyes, [facility] 22     23       24     25     26     27     28     29     30                  Lab Results:  Recent Results (from the past 12 hour(s))   CBC with platelets differential    Collection Time: 08/25/17  8:12 AM   Result Value Ref Range    WBC 6.3 4.0 - 11.0 10e9/L    RBC Count 3.91 3.8 - 5.2 10e12/L    Hemoglobin 10.7 (L) 11.7 - 15.7 g/dL    Hematocrit 32.9 (L) 35.0 - 47.0 %    MCV 84 78 - 100 fl    MCH 27.4 26.5 - 33.0 pg    MCHC 32.5 31.5 - 36.5 g/dL    RDW 14.6 10.0 - 15.0 %    Platelet Count 291 150 - 450 10e9/L    Diff Method Automated Method     % Neutrophils 76.0 %    % Lymphocytes 22.5 %    % Monocytes 0.5 %    % Eosinophils 0.0 %    % Basophils 0.2 %    % Immature Granulocytes 0.8 %    Nucleated RBCs 0 0 /100    Absolute Neutrophil 4.8 1.6 - 8.3 10e9/L    Absolute Lymphocytes 1.4 0.8 - 5.3 10e9/L    Absolute Monocytes 0.0 0.0 - 1.3 10e9/L    Absolute Eosinophils 0.0 0.0 - 0.7 10e9/L    Absolute Basophils 0.0 0.0 - 0.2 10e9/L    Abs Immature Granulocytes 0.1 0 - 0.4 10e9/L    Absolute Nucleated RBC 0.0    CMP - Comprehensive Metabolic Panel    Collection Time: 08/25/17  8:12 AM   Result Value Ref Range    Sodium 140 133 - 144 mmol/L    Potassium 4.4 3.4 - 5.3 mmol/L    Chloride 107 94 - 109 mmol/L    Carbon Dioxide 25 20 - 32 mmol/L    Anion Gap 8 3 - 14 mmol/L    Glucose 182 (H) 70 - 99 mg/dL    Urea Nitrogen 26 7 - 30 mg/dL    Creatinine 0.72 0.52 - 1.04 mg/dL    GFR Estimate 82 >60 mL/min/1.7m2    GFR Estimate If Black >90 >60 mL/min/1.7m2    Calcium 9.4 8.5 - 10.1 mg/dL    Bilirubin Total 0.4 0.2 - 1.3 mg/dL    Albumin 3.6 3.4 - 5.0 g/dL    Protein Total 7.6 6.8 - 8.8 g/dL    Alkaline Phosphatase 95 40 - 150 U/L    ALT 17 0 - 50 U/L    AST 14 0 - 45 U/L   Magnesium    Collection  Time: 08/25/17  8:12 AM   Result Value Ref Range    Magnesium 2.2 1.6 - 2.3 mg/dL               Follow-ups after your visit        Your next 10 appointments already scheduled     Sep 01, 2017  2:15 PM CDT   (Arrive by 2:00 PM)   NEW WITH ROOM with Mariam Sams GC,  2 114 CONSULT [facility])    [address] [place] [id number]   [phone]            Sep 12, 2017  3:00 PM CDT   (Arrive by 2:45 PM)   NEW WITH ROOM with Lola Vasquez, PhD ,  2 114 CONSULT [facility] ([facility])    [address] [place] [id number]   [phone]            Sep 15, 2017  8:30 AM CDT   [facility] Draw with  [facility] DRAW   [facility] Draw ([facility])    [address] [place] [id number] [phone]            Sep 15, 2017  9:00 AM CDT   (Arrive by 8:45 AM)   Return Active Treatment with MARLON Ogden CNP   [facility] ([facility])    [address] [place] [address]   [phone]            Sep 15, 2017 10:00 AM CDT   Infusion 360 with  ONCOLOGY INFUSION, UC 12 ATC   [facility] ([facility])    [address] [place] [id number] [phone]            Sep 21, 2017 11:00 AM CDT   New Visit with Lucina Reyes, [facility] ([facility])    [address] [place] [id number] [phone]            Oct 06, 2017  8:30 AM CDT   [facility] Draw with  [facility] DRAW   [facility] Draw ([facility])    [address] [place] [id number] [phone]              Future tests that were ordered for you today     Open Standing Orders         "Priority Remaining Interval Expires Ordered    Notify Physician Routine [id number] PRN  2017            Who to contact     If you have questions or need follow up information about today's clinic visit or your schedule please contact [facility] directly at [phone].  Normal or non-critical lab and imaging results will be communicated to you by MyChart, letter or phone within 4 business days after the clinic has received the results. If you do not hear from us within 7 days, please contact the clinic through MyChart or phone. If you have a critical or abnormal lab result, we will notify you by phone as soon as possible.  Submit refill requests through [employer] or call your pharmacy and they will forward the refill request to us. Please allow 3 business days for your refill to be completed.          Additional Information About Your Visit        MyChart Information     [employer] lets you send messages to your doctor, view your test results, renew your prescriptions, schedule appointments and more. To sign up, go to [email or web address] . Click on \"Log in\" on the left side of the screen, which will take you to the Welcome page. Then click on \"Sign up Now\" on the right side of the page.     You will be asked to enter the access code listed below, as well as some personal information. Please follow the directions to create your username and password.     Your access code is: [email or web address]  Expires: 10/10/2017 11:16 AM     Your access code will  in 90 days. If you need help or a new code, please call your [place] clinic or [phone].        Care EveryWhere ID     This is your Care EveryWhere ID. This could be used by other organizations to access your [place] medical records  [id number]        Your Vitals Were     Pulse Temperature Respirations Last Period Pulse Oximetry BMI (Body Mass Index)    89 98.9  F (37.2  C) 24 2009 97% 32.34 kg/m2       Blood Pressure from Last 3 " Encounters:   08/25/17 130/80   08/23/17 111/77   08/21/17 122/81    Weight from Last 3 Encounters:   08/25/17 82.8 kg (182 lb 8 oz)   08/23/17 81.3 kg (179 lb 4.8 oz)   08/21/17 82.1 kg (181 lb)              We Performed the Following     CBC with platelets differential     CMP - Comprehensive Metabolic Panel     Magnesium          Today's Medication Changes          These changes are accurate as of: 8/25/17  3:18 PM.  If you have any questions, ask your nurse or doctor.               These medicines have changed or have updated prescriptions.        Dose/Directions    metFORMIN 500 MG 24 hr tablet   Commonly known as:  GLUCOPHAGE-XR   This may have changed:    - how much to take  - when to take this  - additional instructions   Used for:  Type 2 diabetes mellitus without complication, without long-term current use of insulin (H)        Take two tabs by mouth once daily with evening meal.   Quantity:  180 tablet   Refills:  PRN                Primary Care Provider Office Phone # Fax #    Morelia Ayon [phone] [address] [facility] [address]        Equal Access to Services     BHUPINDER [facility] HospitalJULIO : Hadii serena Leach, wagabrielleda reese, qaybta kaaljakub palm, sujatha betts . So [facility] [phone].    ATENCIÓN: Si habla español, tiene a martínez disposición servicios gratuitos de asistencia lingüística. [facility] [phone].    We comply with applicable federal civil rights laws and [place] laws. We do not discriminate on the basis of race, color, national origin, age, disability sex, sexual orientation or gender identity.            Thank you!     Thank you for choosing [facility]  for your care. Our goal is always to provide you with excellent care. Hearing back from our patients is one way we can continue to improve our services. Please take a few minutes to complete the written survey that you may receive in the mail after  your visit with us. Thank you!             Your Updated Medication List - Protect others around you: Learn how to safely use, store and throw away your medicines at www.disposemymeds.org.          This list is accurate as of: 8/25/17  3:18 PM.  Always use your most recent med list.                   Brand Name Dispense Instructions for use Diagnosis    acetaminophen 325 MG tablet    TYLENOL    100 tablet    Take 2 tablets (650 mg) by mouth every 6 hours as needed for mild pain    Cellulitis and abscess of leg, Mass of pelvis       albuterol 108 (90 BASE) MCG/ACT Inhaler    PROAIR HFA    1 Inhaler    Inhale 2 puffs into the lungs 4 times daily as needed for shortness of breath / dyspnea    Cough       atorvastatin 80 MG tablet    LIPITOR    90 tablet    Take 1 tablet (80 mg) by mouth daily    Hyperlipidemia LDL goal <100       CALCIUM-MAGNESIUM-VITAMIN D PO      Take 2 tablets by mouth daily        dexamethasone 4 MG tablet    DECADRON    10 tablet    Take 5 tablets (20 mg) by mouth See Admin Instructions for 2 doses Take at 12 hours and 6 hours prior to Taxol    Ovarian cancer, left (H), Encounter for long-term (current) use of medications       EPINEPHrine 0.3 MG/0.3ML injection 2-pack    EPIPEN/ADRENACLICK/or ANY BX GENERIC EQUIV    1 each    Inject 0.3 mLs (0.3 mg) into the muscle once as needed for anaphylaxis    Bee allergy status       escitalopram 20 MG tablet    LEXAPRO    30 tablet    Take 1 tablet (20 mg) by mouth daily    Major depressive disorder, recurrent episode, mild (H)       fluticasone 50 MCG/ACT spray    FLONASE    48 g    Spray 2 sprays into both nostrils daily    Chronic maxillary sinusitis       lisinopril 10 MG tablet    PRINIVIL/ZESTRIL    90 tablet    Take 1 tablet (10 mg) by mouth daily    Essential hypertension       LORazepam 1 MG tablet    ATIVAN    30 tablet    Take 1 tablet (1 mg) by mouth every 6 hours as needed (nausea/vomiting, anxiety or sleep)    Ovarian cancer, left (H),  "Encounter for long-term (current) use of medications       metFORMIN 500 MG 24 hr tablet    GLUCOPHAGE-XR    180 tablet    Take two tabs by mouth once daily with evening meal.    Type 2 diabetes mellitus without complication, without long-term current use of insulin (H)       MULTIVITAMIN ADULT PO      Take 1 tablet by mouth daily        order for DME     1 Units    Equipment being ordered: blood pressure monitor    Essential hypertension       * order for DME     1 each    Compression stockings 20-30 mm Hg. To be wear when directed by Hematology team and while awake for the first two years post clot.    Long-term (current) use of anticoagulants, Acute deep vein thrombosis (DVT) of left lower extremity, unspecified vein (H)       * order for DME     1 Bottle    Plain packing strip 1/4\"    Abscess of leg       * order for DME     1 Box    Sterile zoey tipped applicators    Abscess of leg       * order for DME     3 Bottle    USE 1/4 INCH  WIDTH PLAIN NU GAUZE PACKING TO DO SELF WOUND CARE OF LEFT LOWER LEG ONCE DAILY. DIMENSIONS OF WOUND PRESENTLY ARE 2 X 2 INCH AND APPROXIMATELY 1/2 INCH DEEP.  USES 6 INCHES OF PACKING CURRENTLY FOR DRESSING CHANGE.  FAX TO [employer] [phone]    Wound of left leg       * order for DME     30 pad    STERILE ADHESIVE PAD BANDAGE AT LEAST 4X4 INCH IN SIZE NEEDED FOR DAILY WOUND CARE TO LEFT LOWER LEG. FAX TO [employer] [phone]    Leg wound, left       polyethylene glycol Packet    MIRALAX/GLYCOLAX    7 packet    Take 17 g by mouth daily    Mass of pelvis       PRO-BIOTIC BLEND PO      Take 1 capsule by mouth daily Enzymatic Therapy-probiotic pearls        prochlorperazine 10 MG tablet    COMPAZINE    30 tablet    Take 1 tablet (10 mg) by mouth every 6 hours as needed (nausea/vomiting)    Ovarian cancer, left (H), Encounter for long-term (current) use of medications       rivaroxaban ANTICOAGULANT 20 MG Tabs tablet    XARELTO    30 tablet    Take 1 tablet (20 mg) " by mouth daily (with dinner)    Long-term (current) use of anticoagulants, Acute deep vein thrombosis (DVT) of left lower extremity, unspecified vein (H)       senna-docusate 8.6-50 MG per tablet    SENOKOT-S;PERICOLACE    100 tablet    Take 2 tablets by mouth 2 times daily Start with 1 tablet PO BID, If no bowel movement in 24 hours, increase to 2 tablets PO BID.  Hold for loose stools.    Mass of pelvis       traZODone 50 MG tablet    DESYREL    180 tablet    TAKE 1 TO 2 TABLETS(50  MG) BY MOUTH EVERY NIGHT AS NEEDED FOR SLEEP    Insomnia, unspecified type       vitamin B complex with vitamin C Tabs tablet      Take 1 tablet by mouth daily        vitamin D 1000 UNITS capsule      Take 2,000 Units by mouth daily        VYVANSE PO      Take 50 mg by mouth daily        * Notice:  This list has 5 medication(s) that are the same as other medications prescribed for you. Read the directions carefully, and ask your doctor or other care provider to review them with you.

## 2017-08-28 ENCOUNTER — TELEPHONE (OUTPATIENT)
Dept: ONCOLOGY | Facility: CLINIC | Age: 62
End: 2017-08-28

## 2017-08-28 ENCOUNTER — CARE COORDINATION (OUTPATIENT)
Dept: ONCOLOGY | Facility: CLINIC | Age: 62
End: 2017-08-28

## 2017-08-28 NOTE — TELEPHONE ENCOUNTER
Hafsa left a voicemail on the triage line stating she had questions to go over with RNCC Shanon Oswald. Writer called her back but missed her, left a voicemail letting her know Shanon would be back in the office tomorrow but I would be happy to go over her questions with her.    Keysha Gibson RN

## 2017-08-28 NOTE — TELEPHONE ENCOUNTER
Spoke to Hafsa who wants to schedule her inpatient chemotherapy, as she did not tolerate it in the outpatient setting. Request sent to clinic coordinators.    Keysha Gibson RN

## 2017-08-28 NOTE — PROGRESS NOTES
Care Coordinator Note   Reviewed reaction with Dr Shaffer on Friday 8/25/17 and again on Monday 8/28/17.  Plan to admit and then give Taxol 6-8 hours.  Plan Dexamethasone 20 and 6 hours prior to then IV Dex prior to starting  PT is wanting to start on Thursday will  arrive on Thursday to the admission departrment then on to 7C.     Patient verbalized back understanding of the above information discussed.   Emely CORONEL RN, OCN  Care Coordinator   Gynecologic Cancer   Office [phone]  Pager [phone] #[phone]

## 2017-08-30 ENCOUNTER — HOSPITAL ENCOUNTER (OUTPATIENT)
Facility: CLINIC | Age: 62
Setting detail: OBSERVATION
Discharge: HOME | End: 2017-08-31
Attending: OBSTETRICS & GYNECOLOGY | Admitting: OBSTETRICS & GYNECOLOGY
Payer: COMMERCIAL

## 2017-08-30 DIAGNOSIS — R05.9 COUGH: ICD-10-CM

## 2017-08-30 DIAGNOSIS — C56.2 OVARIAN CANCER, LEFT (H): ICD-10-CM

## 2017-08-30 DIAGNOSIS — I82.402 ACUTE DEEP VEIN THROMBOSIS (DVT) OF LEFT LOWER EXTREMITY, UNSPECIFIED VEIN (H): Primary | ICD-10-CM

## 2017-08-30 DIAGNOSIS — Z79.899 ENCOUNTER FOR LONG-TERM (CURRENT) USE OF MEDICATIONS: ICD-10-CM

## 2017-08-30 PROBLEM — C56.9 OVARIAN CANCER (H): Status: ACTIVE | Noted: 2017-08-30

## 2017-08-30 LAB — GLUCOSE BLDC GLUCOMTR-MCNC: 149 MG/DL (ref 70–99)

## 2017-08-30 PROCEDURE — 25000125 ZZHC RX 250: Performed by: STUDENT IN AN ORGANIZED HEALTH CARE EDUCATION/TRAINING PROGRAM

## 2017-08-30 PROCEDURE — 00000146 ZZHCL STATISTIC GLUCOSE BY METER IP

## 2017-08-30 PROCEDURE — 25000128 H RX IP 250 OP 636: Performed by: OBSTETRICS & GYNECOLOGY

## 2017-08-30 PROCEDURE — 25000125 ZZHC RX 250: Performed by: OBSTETRICS & GYNECOLOGY

## 2017-08-30 PROCEDURE — 96376 TX/PRO/DX INJ SAME DRUG ADON: CPT

## 2017-08-30 PROCEDURE — 25000132 ZZH RX MED GY IP 250 OP 250 PS 637: Performed by: OBSTETRICS & GYNECOLOGY

## 2017-08-30 PROCEDURE — 96409 CHEMO IV PUSH SNGL DRUG: CPT

## 2017-08-30 PROCEDURE — S0028 INJECTION, FAMOTIDINE, 20 MG: HCPCS | Performed by: OBSTETRICS & GYNECOLOGY

## 2017-08-30 PROCEDURE — 25000132 ZZH RX MED GY IP 250 OP 250 PS 637: Performed by: STUDENT IN AN ORGANIZED HEALTH CARE EDUCATION/TRAINING PROGRAM

## 2017-08-30 PROCEDURE — 96411 CHEMO IV PUSH ADDL DRUG: CPT

## 2017-08-30 PROCEDURE — 96375 TX/PRO/DX INJ NEW DRUG ADDON: CPT

## 2017-08-30 PROCEDURE — 25000128 H RX IP 250 OP 636

## 2017-08-30 PROCEDURE — G0378 HOSPITAL OBSERVATION PER HR: HCPCS

## 2017-08-30 RX ORDER — DEXAMETHASONE 4 MG/1
8 TABLET ORAL EVERY MORNING
Status: DISCONTINUED | OUTPATIENT
Start: 2017-08-31 | End: 2017-08-31 | Stop reason: HOSPADM

## 2017-08-30 RX ORDER — LORAZEPAM 0.5 MG/1
.5-1 TABLET ORAL EVERY 6 HOURS PRN
Status: DISCONTINUED | OUTPATIENT
Start: 2017-08-30 | End: 2017-08-31 | Stop reason: HOSPADM

## 2017-08-30 RX ORDER — ONDANSETRON 8 MG/1
8 TABLET, FILM COATED ORAL DAILY
Status: DISCONTINUED | OUTPATIENT
Start: 2017-08-30 | End: 2017-08-31

## 2017-08-30 RX ORDER — METOCLOPRAMIDE HYDROCHLORIDE 5 MG/ML
10 INJECTION INTRAMUSCULAR; INTRAVENOUS EVERY 6 HOURS PRN
Status: DISCONTINUED | OUTPATIENT
Start: 2017-08-30 | End: 2017-08-31 | Stop reason: HOSPADM

## 2017-08-30 RX ORDER — SODIUM CHLORIDE 9 MG/ML
INJECTION, SOLUTION INTRAVENOUS CONTINUOUS PRN
Status: DISCONTINUED | OUTPATIENT
Start: 2017-08-30 | End: 2017-08-30

## 2017-08-30 RX ORDER — DEXAMETHASONE SODIUM PHOSPHATE 10 MG/ML
20 INJECTION, SOLUTION INTRAMUSCULAR; INTRAVENOUS ONCE
Status: COMPLETED | OUTPATIENT
Start: 2017-08-30 | End: 2017-08-30

## 2017-08-30 RX ORDER — ATORVASTATIN CALCIUM 80 MG/1
80 TABLET, FILM COATED ORAL EVERY EVENING
Status: DISCONTINUED | OUTPATIENT
Start: 2017-08-30 | End: 2017-08-31 | Stop reason: HOSPADM

## 2017-08-30 RX ORDER — ATORVASTATIN CALCIUM 80 MG/1
80 TABLET, FILM COATED ORAL DAILY
Status: DISCONTINUED | OUTPATIENT
Start: 2017-08-30 | End: 2017-08-30

## 2017-08-30 RX ORDER — AMOXICILLIN 250 MG
1-2 CAPSULE ORAL 2 TIMES DAILY
Status: DISCONTINUED | OUTPATIENT
Start: 2017-08-30 | End: 2017-08-31 | Stop reason: HOSPADM

## 2017-08-30 RX ORDER — DIPHENHYDRAMINE HYDROCHLORIDE 50 MG/ML
50 INJECTION INTRAMUSCULAR; INTRAVENOUS
Status: DISCONTINUED | OUTPATIENT
Start: 2017-08-30 | End: 2017-08-31 | Stop reason: HOSPADM

## 2017-08-30 RX ORDER — MEPERIDINE HYDROCHLORIDE 25 MG/ML
25 INJECTION INTRAMUSCULAR; INTRAVENOUS; SUBCUTANEOUS EVERY 30 MIN PRN
Status: DISCONTINUED | OUTPATIENT
Start: 2017-08-30 | End: 2017-08-30

## 2017-08-30 RX ORDER — ALBUTEROL SULFATE 0.83 MG/ML
2.5 SOLUTION RESPIRATORY (INHALATION)
Status: DISCONTINUED | OUTPATIENT
Start: 2017-08-30 | End: 2017-08-31 | Stop reason: HOSPADM

## 2017-08-30 RX ORDER — ALBUTEROL SULFATE 90 UG/1
1-2 AEROSOL, METERED RESPIRATORY (INHALATION)
Status: DISCONTINUED | OUTPATIENT
Start: 2017-08-30 | End: 2017-08-30

## 2017-08-30 RX ORDER — NALOXONE HYDROCHLORIDE 0.4 MG/ML
.1-.4 INJECTION, SOLUTION INTRAMUSCULAR; INTRAVENOUS; SUBCUTANEOUS
Status: DISCONTINUED | OUTPATIENT
Start: 2017-08-30 | End: 2017-08-31 | Stop reason: HOSPADM

## 2017-08-30 RX ORDER — METOCLOPRAMIDE 10 MG/1
10 TABLET ORAL EVERY 6 HOURS PRN
Status: DISCONTINUED | OUTPATIENT
Start: 2017-08-30 | End: 2017-08-31 | Stop reason: HOSPADM

## 2017-08-30 RX ORDER — DIPHENHYDRAMINE HCL 50 MG
50 CAPSULE ORAL ONCE
Status: COMPLETED | OUTPATIENT
Start: 2017-08-30 | End: 2017-08-30

## 2017-08-30 RX ORDER — PROCHLORPERAZINE MALEATE 5 MG
10 TABLET ORAL EVERY 6 HOURS PRN
Status: DISCONTINUED | OUTPATIENT
Start: 2017-08-30 | End: 2017-08-31 | Stop reason: HOSPADM

## 2017-08-30 RX ORDER — LORAZEPAM 2 MG/ML
.5-1 INJECTION INTRAMUSCULAR EVERY 6 HOURS PRN
Status: DISCONTINUED | OUTPATIENT
Start: 2017-08-30 | End: 2017-08-31 | Stop reason: HOSPADM

## 2017-08-30 RX ORDER — DEXTROSE MONOHYDRATE 25 G/50ML
25-50 INJECTION, SOLUTION INTRAVENOUS
Status: DISCONTINUED | OUTPATIENT
Start: 2017-08-30 | End: 2017-08-31 | Stop reason: HOSPADM

## 2017-08-30 RX ORDER — NICOTINE POLACRILEX 4 MG
15-30 LOZENGE BUCCAL
Status: DISCONTINUED | OUTPATIENT
Start: 2017-08-30 | End: 2017-08-31 | Stop reason: HOSPADM

## 2017-08-30 RX ORDER — DIPHENHYDRAMINE HYDROCHLORIDE 50 MG/ML
50 INJECTION INTRAMUSCULAR; INTRAVENOUS
Status: DISCONTINUED | OUTPATIENT
Start: 2017-08-30 | End: 2017-08-30

## 2017-08-30 RX ORDER — METFORMIN HCL 500 MG
1000 TABLET, EXTENDED RELEASE 24 HR ORAL
Status: DISCONTINUED | OUTPATIENT
Start: 2017-08-30 | End: 2017-08-31 | Stop reason: HOSPADM

## 2017-08-30 RX ORDER — DIPHENHYDRAMINE HCL 50 MG
50 CAPSULE ORAL ONCE
Status: COMPLETED | OUTPATIENT
Start: 2017-08-31 | End: 2017-08-31

## 2017-08-30 RX ORDER — TRAZODONE HYDROCHLORIDE 50 MG/1
50 TABLET, FILM COATED ORAL
Status: DISCONTINUED | OUTPATIENT
Start: 2017-08-30 | End: 2017-08-31 | Stop reason: HOSPADM

## 2017-08-30 RX ORDER — ACETAMINOPHEN 325 MG/1
650 TABLET ORAL EVERY 4 HOURS PRN
Status: DISCONTINUED | OUTPATIENT
Start: 2017-08-30 | End: 2017-08-31 | Stop reason: HOSPADM

## 2017-08-30 RX ORDER — ALBUTEROL SULFATE 90 UG/1
2 AEROSOL, METERED RESPIRATORY (INHALATION) 4 TIMES DAILY PRN
Status: DISCONTINUED | OUTPATIENT
Start: 2017-08-30 | End: 2017-08-31 | Stop reason: HOSPADM

## 2017-08-30 RX ORDER — PROCHLORPERAZINE MALEATE 5 MG
10 TABLET ORAL ONCE
Status: COMPLETED | OUTPATIENT
Start: 2017-08-30 | End: 2017-08-30

## 2017-08-30 RX ORDER — SODIUM CHLORIDE 9 MG/ML
1000 INJECTION, SOLUTION INTRAVENOUS CONTINUOUS PRN
Status: DISCONTINUED | OUTPATIENT
Start: 2017-08-30 | End: 2017-08-31 | Stop reason: HOSPADM

## 2017-08-30 RX ORDER — EPINEPHRINE 0.3 MG/.3ML
0.3 INJECTION SUBCUTANEOUS
Status: DISCONTINUED | OUTPATIENT
Start: 2017-08-30 | End: 2017-08-30

## 2017-08-30 RX ORDER — METHYLPREDNISOLONE SODIUM SUCCINATE 125 MG/2ML
125 INJECTION, POWDER, LYOPHILIZED, FOR SOLUTION INTRAMUSCULAR; INTRAVENOUS
Status: DISCONTINUED | OUTPATIENT
Start: 2017-08-30 | End: 2017-08-30

## 2017-08-30 RX ORDER — PROCHLORPERAZINE 25 MG
25 SUPPOSITORY, RECTAL RECTAL EVERY 12 HOURS PRN
Status: DISCONTINUED | OUTPATIENT
Start: 2017-08-30 | End: 2017-08-30

## 2017-08-30 RX ORDER — ALBUTEROL SULFATE 0.83 MG/ML
2.5 SOLUTION RESPIRATORY (INHALATION)
Status: DISCONTINUED | OUTPATIENT
Start: 2017-08-30 | End: 2017-08-30

## 2017-08-30 RX ORDER — LISINOPRIL 10 MG/1
10 TABLET ORAL DAILY
Status: DISCONTINUED | OUTPATIENT
Start: 2017-08-30 | End: 2017-08-31 | Stop reason: HOSPADM

## 2017-08-30 RX ORDER — ESCITALOPRAM OXALATE 20 MG/1
20 TABLET ORAL DAILY
Status: DISCONTINUED | OUTPATIENT
Start: 2017-08-30 | End: 2017-08-31 | Stop reason: HOSPADM

## 2017-08-30 RX ORDER — METHYLPREDNISOLONE SODIUM SUCCINATE 125 MG/2ML
125 INJECTION, POWDER, LYOPHILIZED, FOR SOLUTION INTRAMUSCULAR; INTRAVENOUS
Status: DISCONTINUED | OUTPATIENT
Start: 2017-08-30 | End: 2017-08-31 | Stop reason: HOSPADM

## 2017-08-30 RX ORDER — MEPERIDINE HYDROCHLORIDE 25 MG/ML
25 INJECTION INTRAMUSCULAR; INTRAVENOUS; SUBCUTANEOUS EVERY 30 MIN PRN
Status: DISCONTINUED | OUTPATIENT
Start: 2017-08-30 | End: 2017-08-31 | Stop reason: HOSPADM

## 2017-08-30 RX ORDER — ALBUTEROL SULFATE 90 UG/1
1-2 AEROSOL, METERED RESPIRATORY (INHALATION)
Status: DISCONTINUED | OUTPATIENT
Start: 2017-08-30 | End: 2017-08-31 | Stop reason: HOSPADM

## 2017-08-30 RX ORDER — LORAZEPAM 1 MG/1
1 TABLET ORAL EVERY 6 HOURS PRN
Status: DISCONTINUED | OUTPATIENT
Start: 2017-08-30 | End: 2017-08-30

## 2017-08-30 RX ORDER — PROCHLORPERAZINE MALEATE 5 MG
5-10 TABLET ORAL EVERY 6 HOURS PRN
Status: DISCONTINUED | OUTPATIENT
Start: 2017-08-30 | End: 2017-08-30

## 2017-08-30 RX ORDER — SODIUM CHLORIDE 9 MG/ML
INJECTION, SOLUTION INTRAVENOUS
Status: COMPLETED
Start: 2017-08-30 | End: 2017-08-30

## 2017-08-30 RX ORDER — ONDANSETRON 4 MG/1
4 TABLET, ORALLY DISINTEGRATING ORAL EVERY 6 HOURS PRN
Status: DISCONTINUED | OUTPATIENT
Start: 2017-08-30 | End: 2017-08-31 | Stop reason: HOSPADM

## 2017-08-30 RX ORDER — EPINEPHRINE 1 MG/ML
0.3 INJECTION INTRAMUSCULAR; INTRAVENOUS; SUBCUTANEOUS EVERY 5 MIN PRN
Status: DISCONTINUED | OUTPATIENT
Start: 2017-08-30 | End: 2017-08-30

## 2017-08-30 RX ORDER — ONDANSETRON 2 MG/ML
4 INJECTION INTRAMUSCULAR; INTRAVENOUS EVERY 6 HOURS PRN
Status: DISCONTINUED | OUTPATIENT
Start: 2017-08-30 | End: 2017-08-31 | Stop reason: HOSPADM

## 2017-08-30 RX ORDER — LORAZEPAM 1 MG/1
1 TABLET ORAL EVERY 6 HOURS PRN
Status: DISCONTINUED | OUTPATIENT
Start: 2017-08-30 | End: 2017-08-31 | Stop reason: HOSPADM

## 2017-08-30 RX ADMIN — DIPHENHYDRAMINE HCL 50 MG: 50 CAPSULE ORAL at 20:22

## 2017-08-30 RX ADMIN — PROCHLORPERAZINE MALEATE 10 MG: 5 TABLET, FILM COATED ORAL at 12:51

## 2017-08-30 RX ADMIN — DIPHENHYDRAMINE HCL 50 MG: 50 CAPSULE ORAL at 12:50

## 2017-08-30 RX ADMIN — SODIUM CHLORIDE 150 MG: 9 INJECTION, SOLUTION INTRAVENOUS at 12:50

## 2017-08-30 RX ADMIN — DEXAMETHASONE SODIUM PHOSPHATE 20 MG: 10 INJECTION INTRAMUSCULAR; INTRAVENOUS at 12:59

## 2017-08-30 RX ADMIN — ONDANSETRON HYDROCHLORIDE 8 MG: 8 TABLET, FILM COATED ORAL at 12:50

## 2017-08-30 RX ADMIN — Medication 3.6 MG: at 15:39

## 2017-08-30 RX ADMIN — RIVAROXABAN 20 MG: 20 TABLET, FILM COATED ORAL at 18:53

## 2017-08-30 RX ADMIN — PACLITAXEL 305 MG: 6 INJECTION, SOLUTION INTRAVENOUS at 21:27

## 2017-08-30 RX ADMIN — Medication 0.36 MG: at 13:54

## 2017-08-30 RX ADMIN — DEXAMETHASONE SODIUM PHOSPHATE 20 MG: 10 INJECTION INTRAMUSCULAR; INTRAVENOUS at 20:22

## 2017-08-30 RX ADMIN — FAMOTIDINE 40 MG: 10 INJECTION, SOLUTION INTRAVENOUS at 12:58

## 2017-08-30 RX ADMIN — SODIUM CHLORIDE 500 ML: 9 INJECTION, SOLUTION INTRAVENOUS at 13:08

## 2017-08-30 RX ADMIN — PACLITAXEL 36 MG: 6 INJECTION, SOLUTION INTRAVENOUS at 17:12

## 2017-08-30 RX ADMIN — TRAZODONE HYDROCHLORIDE 50 MG: 50 TABLET ORAL at 21:58

## 2017-08-30 RX ADMIN — ATORVASTATIN CALCIUM 80 MG: 80 TABLET, FILM COATED ORAL at 21:58

## 2017-08-30 RX ADMIN — METFORMIN HYDROCHLORIDE 1000 MG: 500 TABLET, EXTENDED RELEASE ORAL at 18:53

## 2017-08-30 RX ADMIN — LISINOPRIL 10 MG: 10 TABLET ORAL at 21:58

## 2017-08-30 NOTE — IP AVS SNAPSHOT
[address] [address]    Phone:  [phone]                                       After Visit Summary   8/30/2017    Hafsa Corona    MRN: [id number]           After Visit Summary Signature Page     I have received my discharge instructions, and my questions have been answered. I have discussed any challenges I see with this plan with the nurse or doctor.    ..........................................................................................................................................  Patient/Patient Representative Signature      ..........................................................................................................................................  Patient Representative Print Name and Relationship to Patient    ..................................................               ................................................  Date                                            Time    ..........................................................................................................................................  Reviewed by Signature/Title    ...................................................              ..............................................  Date                                                            Time

## 2017-08-30 NOTE — PLAN OF CARE
Problem: Goal Outcome Summary  Goal: Goal Outcome Summary  Outcome: No Change  Chemotherapy completed and tolerated without hypersensitivity reaction.   Nurse to notify provider when observation goals have been met and patient is ready for discharge.     Goals not met, first bag of taxol started at 2pm, pre-meds given. All vitals stable, patient tolerating infusion so far.

## 2017-08-30 NOTE — PROGRESS NOTES
Chemotherapy completed and tolerated without hypersensitivity reaction.   Nurse to notify provider when observation goals have been met and patient is ready for discharge.    Goals not met, awaiting chemo from pharmacy, port accessed has + blood return.

## 2017-08-30 NOTE — PROGRESS NOTES
Social Work: Assessment with Discharge Plan    Patient Name:  Hafsa Corona  :  1955  Age:  62 year old  MRN:  [id number]  Risk/Complexity Score:  Filed Complexity Screen Score: 9  Completed assessment with:  Pt, medical chart    Presenting Information   Reason for Referral:  Mental illness  Date of Intake:  2017  Referral Source:  Chart Review  Decision Maker:  Pt  Alternate Decision Maker:  Pt's sister (Christine: h: [phone]; c: [phone])  Health Care Directive:  Did not discuss  Living Situation:  Apartment  Previous Functional Status:  Independent  Patient and family understanding of hospitalization:  Pt demonstrated a good understading of hospitalization and said she had not been able to get the chemo started outpt, so was happy to be alex to come to the hospital to start it.   Cultural/Language/Spiritual Considerations:  Pt identifies with Restoration. Pt said she is being offered a lot of support from her Catholic.   Adjustment to Illness:  Pt discussed stressors of coordinating medical appts, Tx, providers, etc. Pt said that she hopes that starting chemo this admission will allow her to start to focus on other things in her life (mental health, work, housekeeping).     Physical Health  Reason for Admission:    1. Ovarian cancer, left (H)    2. Encounter for long-term (current) use of medications      Services Needed/Recommended:  Home with no services    Mental Health/Chemical Dependency  Diagnosis:  Anxiety, ADD, Depression, Panic Disorder  Support/Services in Place:  Pt said she used to see a therapist, but had to discontinue d/t financial reasons. Pt stated that she takes Lexipro and has a very good psychiatric NP (Angelica Estrella). Per outpt SW note in medical chart, pt is scheduled to see psychologistLola on .   Services Needed/Recommended:  Outpt MH services.     Support System  Significant relationship at present time:  Pt stated that she has a SO (Phil), but that he is  "\"part-time\" in relation to his availability to support her.   Family of origin is available for support:  Pt's siblings (two sisters and two brothers) live in [place] and [place]: Christine (h: [phone]; c: [phone]), Phil Rogers: ([phone])  Other support available:  Pt named a number of friends in the area who are very supportive. Pt said her Yarsani has offered a lot of help and support, but she has not had the time to engage with any of the community members as of yet.   Gaps in support system:  None reported  Patient is caregiver to:  Pt mentioned having 2 dogs.     Provider Information   Primary Care Physician:  Morelia Ayon   [phone]   Clinic:  [address] [place] [id number]      :  Pt working with outpt SW in [facility] (Ana Cristina Swansonhicandido)    Financial   Income Source:  Pt said she is in the process of applying for Disability. Pt said she still has  Job at [employer] in [facility], but is not currently working.   Financial Concerns:  Pt said getting on disability will be a big financial help.  Insurance:    Payor/Plan Subscriber Name Rel Member # Group #   [employer] E  [id number]       [id number]       Discharge Plan   Patient and family discharge goal:  D/c home, no services.   Provided education on discharge plan:  NO  Patient agreeable to discharge plan:  NO  A list of Medicare Certified Facilities was provided to the patient and/or family to encourage patient choice. Patient's choices for facility are:  NA  Barriers to discharge: None    Discharge Recommendations   Anticipated Disposition:  Home, no needs identified  Transportation Needs:  Other:  Unknown  Name of Transportation Company and Phone:  NA    Additional comments   SW met with pt, as SW worked extensively with pt during last admission. Pt said that she is \"having more good days than bad\" and that she has a lot of good support. Pt shared that she really enjoyed her stay at " [facility] after last hospital admission. Pt discussed stress of coordinating care and appts, as well as difficulty in asking for help. Pt said she hopes now that she has started chemo she will be able to organize other parts of her life.     MARY Marie, [address] Surgical Oncology Unit  Phone: [phone]  Pager: [phone]

## 2017-08-30 NOTE — H&P
Gynecology/Oncology H&P    CC: inpatient chemotherapy    HPI: Hafsa Corona is a 62 year old with Stage IC2 clear cell carcinoma who presenting for Taxol desensitization and inpatient chemotherapy do to two Taxol hypersensitivity reactions in the infusion clinic. Patient is feeling well today. She took her Dexamethasone and Benadryl as prescribed last night and this morning.    Cancer Hx  7/3/17 Presented to the ED and was found to have 9 cm mass CA-125 1187  7/7/17 underwent HELENE, BSO, PPALND, with ruptured mass before surgery, pathology with clear cell carcinoma  7/31/17 Plan for Carbo Taxol 3 cycles   8/16/17 Taxol hypersensitivity reaction CA-125 73  8/25/17 Taxol hypersensitivity reaction     ROS:  Gen: No fevers/chills  HEENT: no changes in vision, +sinus pain and pressure   Neuro: No dizziness.  CV: No CP  Pulm: No SOB or cough  GI: No N/V.  No constipation/diarrhea  : No dysuria, frequency, urgency.  No bothersome vaginal discharge  Skin: No rashes or itching  MSK: No joint problems.  Endocrine: No DM  Allergy/Immunology: No autoimmune diseases  Psych: + anxiety/depression    PMH:  Past Medical History:   Diagnosis Date     Anxiety state, unspecified     [id number]     Attention deficit disorder without mention of hyperactivity      Chronic rhinitis      Depressive disorder, not elsewhere classified      Panic disorder without agoraphobia      Pure hypercholesterolemia     Lipitor     Tachycardia, unspecified     9/1999-2/2004     Type II or unspecified type diabetes mellitus without mention of complication, not stated as uncontrolled     diagnosed 2004       PSHx:  Past Surgical History:   Procedure Laterality Date     HYSTERECTOMY TOTAL ABDOMINAL, BILATERAL SALPINGO-OOPHORECTOMY, NODE DISSECTION, COMBINED Left 7/7/2017    Procedure: COMBINED HYSTERECTOMY TOTAL ABDOMINAL, SALPINGO-OOPHORECTOMY, NODE DISSECTION;  Exploratory Laparotomy, Left Salpingo-Oophorectomy, Cancer Staging, Total Hysterectomy,  Omentectomy, Evacuation of abdominal fluid, Lymph Node Dissection  Anesthesia Block ;  Surgeon: Serena Cole MD;  Location: [facility] OR     HYSTERECTOMY, PAP NO LONGER INDICATED  07/07/2017    Laparotomy; for ovarian cancer staging     INSERT PORT VASCULAR ACCESS Right 8/21/2017    Procedure: INSERT PORT VASCULAR ACCESS;  Single Lumen Chest Power Port;  Surgeon: Stephen Mike PA-C;  Location: [facility] OR     LYMPHADENECTOMY RETROPERITONEAL Bilateral 07/07/2017    Laparotomy; pelvics & para-aortics; for ovarian cancer staging     OMENTECTOMY  07/07/2017    Laparotomy; for ovarian cancer staging     SALPINGO OOPHORECTOMY,R/L/KAYY Right 2008    Salpingo Oophorectomy, RT     SALPINGO OOPHORECTOMY,R/L/KAYY Left 07/07/2017    Laparotomy; for ovarian cancer staging     SURGICAL HISTORY OF -       facial surgery d/t fall in 1/2002     SURGICAL HISTORY OF -       1985 removal of breast cyst     SURGICAL HISTORY OF -   2008    endometrial ablation       Meds:    No current facility-administered medications on file prior to encounter.   Current Outpatient Prescriptions on File Prior to Encounter:  dexamethasone (DECADRON) 4 MG tablet Take 5 tablets (20 mg) by mouth See Admin Instructions for 2 doses Take at 12 hours and 6 hours prior to Taxol   order for DME STERILE ADHESIVE PAD BANDAGE AT LEAST 4X4 INCH IN SIZE NEEDED FOR DAILY WOUND CARE TO LEFT LOWER LEG.FAX TO [employer] [phone]   order for DME USE 1/4 INCH  WIDTH PLAIN NU GAUZE PACKING TO DO SELF WOUND CARE OF LEFT LOWER LEG ONCE DAILY.DIMENSIONS OF WOUND PRESENTLY ARE 2 X 2 INCH AND APPROXIMATELY 1/2 INCH DEEP.  USES 6 INCHES OF PACKING CURRENTLY FOR DRESSING CHANGE.FAX TO [employer] -[phone]   LORazepam (ATIVAN) 1 MG tablet Take 1 tablet (1 mg) by mouth every 6 hours as needed (nausea/vomiting, anxiety or sleep)   prochlorperazine (COMPAZINE) 10 MG tablet Take 1 tablet (10 mg) by mouth every 6 hours as needed (nausea/vomiting) (Patient not  "taking: Reported on 8/23/2017)   rivaroxaban ANTICOAGULANT (XARELTO) 20 MG TABS tablet Take 1 tablet (20 mg) by mouth daily (with dinner)   traZODone (DESYREL) 50 MG tablet TAKE 1 TO 2 TABLETS(50  MG) BY MOUTH EVERY NIGHT AS NEEDED FOR SLEEP   order for DME Plain packing strip 1/4\"   order for DME Sterile zoey tipped applicators   acetaminophen (TYLENOL) 325 MG tablet Take 2 tablets (650 mg) by mouth every 6 hours as needed for mild pain (Patient not taking: Reported on 8/23/2017)   polyethylene glycol (MIRALAX/GLYCOLAX) Packet Take 17 g by mouth daily   senna-docusate (SENOKOT-S;PERICOLACE) 8.6-50 MG per tablet Take 2 tablets by mouth 2 times daily Start with 1 tablet PO BID, If no bowel movement in 24 hours, increase to 2 tablets PO BID.  Hold for loose stools. (Patient not taking: Reported on 8/23/2017)   Multiple Vitamins-Minerals (MULTIVITAMIN ADULT PO) Take 1 tablet by mouth daily   vitamin B complex with vitamin C (VITAMIN  B COMPLEX) TABS tablet Take 1 tablet by mouth daily   Lisdexamfetamine Dimesylate (VYVANSE PO) Take 50 mg by mouth daily   order for DME Compression stockings 20-30 mm Hg. To be wear when directed by Hematology team and while awake for the first two years post clot.   CALCIUM-MAGNESIUM-VITAMIN D PO Take 2 tablets by mouth daily   Probiotic Product (PRO-BIOTIC BLEND PO) Take 1 capsule by mouth daily Enzymatic Therapy-probiotic pearls   atorvastatin (LIPITOR) 80 MG tablet Take 1 tablet (80 mg) by mouth daily   order for DME Equipment being ordered: blood pressure monitor   lisinopril (PRINIVIL/ZESTRIL) 10 MG tablet Take 1 tablet (10 mg) by mouth daily   metFORMIN (GLUCOPHAGE-XR) 500 MG 24 hr tablet Take two tabs by mouth once daily with evening meal. (Patient taking differently: 1,000 mg daily (with dinner) Take two tabs by mouth once daily with evening meal.)   escitalopram (LEXAPRO) 20 MG tablet Take 1 tablet (20 mg) by mouth daily   EPINEPHrine (EPIPEN) 0.3 MG/0.3ML injection " Inject 0.3 mLs (0.3 mg) into the muscle once as needed for anaphylaxis   albuterol (ALBUTEROL) 108 (90 BASE) MCG/ACT inhaler Inhale 2 puffs into the lungs 4 times daily as needed for shortness of breath / dyspnea   fluticasone (FLONASE) 50 MCG/ACT nasal spray Spray 2 sprays into both nostrils daily (Patient not taking: Reported on 2017)   Cholecalciferol (VITAMIN D) 1000 UNIT capsule Take 2,000 Units by mouth daily         Allergies:     Allergies   Allergen Reactions     Wasps [Hornets] Anaphylaxis     Paclitaxel      Sulfa Drugs Hives        FamHx:  Family History   Problem Relation Age of Onset     C.A.D. Father      DIABETES Father      Hypertension Father      CEREBROVASCULAR DISEASE Father      Psychotic Disorder Father      Pancreatic Cancer Father      C.A.D. Mother      Hypertension Mother      Breast Cancer Mother      Psychotic Disorder Mother      Colon Cancer Mother      CANCER Mother      Bone cancer     Prostate Problems Brother      cleared      Psychotic Disorder Sister      x2     Neurologic Disorder Sister      Psychotic Disorder Brother      x2     Depression Brother      major depressive disorder and OCD     Anxiety Disorder Brother      MENTAL ILLNESS Brother      Psychotic Disorder Maternal Grandmother      ?     Psychotic Disorder Maternal Grandfather      ?     Psychotic Disorder Paternal Grandmother      Schizophernia     Psychotic Disorder Paternal Grandfather      ?     Respiratory Paternal Grandfather       of emphysema and smoking     Psychotic Disorder Sister      Other - See Comments Sister      small kidney stone      Cancer - colorectal No family hx of        SocialHx:  Social History     Social History     Marital status: Single     Spouse name: N/A     Number of children: N/A     Years of education: N/A     Social History Main Topics     Smoking status: Never Smoker     Smokeless tobacco: Never Used     Alcohol use Yes      Comment: rarely     Drug use: No     Sexual  "activity: Yes     Birth control/ protection: None     Other Topics Concern     Not on file     Social History Narrative       Vitals:  Vitals:    08/30/17 0800 08/30/17 0900   BP:  108/64   Pulse:  84   Resp:  16   Temp:  97.4  F (36.3  C)   TempSrc:  Oral   SpO2:  93%   Weight: 82.1 kg (181 lb 1.6 oz)    Height: 1.6 m (5' 3\")        PEx:  Gen: No distress, comfortable  HEENT: Normocephalic, atraumatic; supple neck  Neuro: PERRL, EOMI; grossly normal motor movement bilaterally  CV: RRR, nl S1/S2, no murmurs/clicks/gallops, peripheral pulses 2+ bilaterally  Pulm: CTAB, no wheezing/rhonchi/crackles; non-labored breathing  Abd: Soft, non-tender, non-distended; BS present  Pelvic: deferred  Ext: Non-tender, no erythema; no edema  Skin: No rashes appreciated  Psych: Appropriate insight/judgment    Labs:      8/25/2017 08:12   Sodium 140   Potassium 4.4   Chloride 107   Carbon Dioxide 25   Urea Nitrogen 26   Creatinine 0.72   GFR Estimate 82   GFR Estimate If Black >90   Calcium 9.4   Anion Gap 8   Magnesium 2.2   Albumin 3.6   Protein Total 7.6   Bilirubin Total 0.4   Alkaline Phosphatase 95   ALT 17   AST 14   Glucose 182 (H)   WBC 6.3   Hemoglobin 10.7 (L)   Hematocrit 32.9 (L)   Platelet Count 291   RBC Count 3.91   MCV 84   MCH 27.4   MCHC 32.5   RDW 14.6   Diff Method Automated Method   % Neutrophils 76.0   % Lymphocytes 22.5   % Monocytes 0.5   % Eosinophils 0.0   % Basophils 0.2   % Immature Granulocytes 0.8   Nucleated RBCs 0   Absolute Neutrophil 4.8   Absolute Lymphocytes 1.4   Absolute Monocytes 0.0   Absolute Eosinophils 0.0   Absolute Basophils 0.0   Abs Immature Granulocytes 0.1   Absolute Nucleated RBC 0.0       Imaging:  No recent imaging    Assessment: 62 year old admitted for inpatient Taxol due to previous hypersensitivity reaction.    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 Receiving dexamethasone and Taxol today with plan for Carbo tomorrow.  FEN: Regular diet.  Pain: Tylenol prn  Heme: Hgb 10.7, Plt 291--ok for chemo. 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 HLD home atorvastatin held.  Resp: NI  GI: GERD. On home Pepcid.  : NI  MSK: NI  Neuro/Psych: Anxiety/Depression. On home Lexapro.  Endocrine: DMII. Home Metformin ordered.  ID: AF, NI  PPx: SCDs, home Xarelto  Disposition: Observation for inpatient chemo and monitoring for hypersenstivitiy reaction    Laura Davila MD   OB/GYN PGY-1  8/30/2017 9:15 AM

## 2017-08-30 NOTE — PROGRESS NOTES
Patient admitted for taxol desensitization and carbo. Patient is admitted under observation, goals not met:  Chemotherapy completed and tolerated without hypersensitivity reaction.   Nurse to notify provider when observation goals have been met and patient is ready for discharge.

## 2017-08-30 NOTE — IP AVS SNAPSHOT
MRN:[id number]                      After Visit Summary   8/30/2017    Hafsa Corona    MRN: [id number]           Thank you!     Thank you for choosing [facility] for your care. Our goal is always to provide you with excellent care. Hearing back from our patients is one way we can continue to improve our services. Please take a few minutes to complete the written survey that you may receive in the mail after you visit with us. Thank you!        Patient Information     Date Of Birth          1955        Designated Caregiver       Most Recent Value    Caregiver    Will someone help with your care after discharge? no      About your hospital stay     You were admitted on:  August 30, 2017 You last received care in the:  [address] [facility]    You were discharged on:  August 31, 2017        Reason for your hospital stay       Chemotherapy                  Who to Call     For medical emergencies, please call 911.  For non-urgent questions about your medical care, please call your primary care provider or clinic, [phone]          Attending Provider     Provider Specialty    Jarod Shaffer MD Obstetrics & Gynecology, Maternal & Fetal Medicine    Gustavo Sotelo MD Oncology       Primary Care Provider Office Phone # Fax #    Morelia Ayon [phone]      After Care Instructions     Activity       Your activity upon discharge: activity as tolerated and no driving for today            Diet       Follow this diet upon discharge: Orders Placed This Encounter      Regular Diet Adult                  Follow-up Appointments     Adult [facility] Follow-up and recommended labs and tests       Please follow-up at clinic on 9/15/17 at 8:30 am for chemo.    Please call [phone] with any questions or concerns.                  Your next 10 appointments already scheduled     Sep 12, 2017  3:00 PM CDT   (Arrive by 2:45 PM)   NEW WITH ROOM with Lola Vasquez, PhD LP,  2  114 CONSULT RM   [facility] ([facility])    [address]  [address] Floor  [place] [address]   [phone]            Sep 15, 2017  8:30 AM CDT   [facility] Draw with  [facility] DRAW   [facility]    [address]  [address] Floor  [place] [address]   [phone]            Sep 15, 2017  9:00 AM CDT   (Arrive by 8:45 AM)   Return Active Treatment with MARLON Ogden CNP   [facility] ([facility])    [address]  [address] Floor  [place] [address]   [phone]            Sep 15, 2017 10:00 AM CDT   Infusion 360 with  ONCOLOGY INFUSION, UC 12 ATC   [facility] ([facility])    [address]  [address]  [place] [address]   [phone]            Sep 15, 2017  1:00 PM CDT   (Arrive by 12:45 PM)   NEW WITH ROOM with Mariam Sams GC   [facility] ([facility])    [address]  [address] Floor  [place] [address]   [phone]            Sep 21, 2017 11:00 AM CDT   New Visit with Lucina Reyes [facility] ([facility])    [address] [place] [address]   [phone]            Oct 06, 2017  8:30 AM CDT   [facility] Draw with  [facility] DRAW   [facility])    [address]  [address] Floor  [place] [address]   [phone]            Oct 06, 2017  9:00 AM CDT   (Arrive by 8:45 AM)   Return Active Treatment with MARLON Ogden CNP   [facility] ([facility])    [address]  [address]  [place] [address]   [phone]              Pending Results     No orders found for last 3 day(s).            Statement of Approval     Ordered           "17 1344  I have reviewed and agree with all the recommendations and orders detailed in this document.  EFFECTIVE NOW     Approved and electronically signed by:  Laura Davila MD             Admission Information     Date & Time Provider Department Dept. Phone    2017 Gustavo Sotelo MD [address] [facility] [phone]      Your Vitals Were     Blood Pressure Pulse Temperature Respirations Height Weight    135/78 75 98  F (36.7  C) (Oral) 16 1.6 m (5' 3\") 82.1 kg (181 lb 1.6 oz)    Last Period Pulse Oximetry BMI (Body Mass Index)             2009 98% 32.08 kg/m2         MyChart Information     [facility] lets you send messages to your doctor, view your test results, renew your prescriptions, schedule appointments and more. To sign up, go to [email or web address] . Click on \"Log in\" on the left side of the screen, which will take you to the Welcome page. Then click on \"Sign up Now\" on the right side of the page.     You will be asked to enter the access code listed below, as well as some personal information. Please follow the directions to create your username and password.     Your access code is: [email or web address]  Expires: 10/10/2017 11:16 AM     Your access code will  in 90 days. If you need help or a new code, please call your [facility] clinic or [phone].        Care EveryWhere ID     This is your Care EveryWhere ID. This could be used by other organizations to access your [facility] medical records  [id number]        Equal Access to Services     [facility]: Hadii serena tellez Soaxel, waaxda luqadaha, qaybta kaalmasujatha otoole . So [facility] [phone].    ATENCIÓN: Si habla español, tiene a martínez disposición servicios gratuitos de asistencia lingüística. Llame al [phone].    We comply with applicable federal civil rights laws and [place] laws. We do not discriminate on the basis of race, color, national origin, age, disability " sex, sexual orientation or gender identity.               Review of your medicines      CONTINUE these medicines which may have CHANGED, or have new prescriptions. If we are uncertain of the size of tablets/capsules you have at home, strength may be listed as something that might have changed.        Dose / Directions    dexamethasone 4 MG tablet   Commonly known as:  DECADRON   This may have changed:    - how much to take  - when to take this  - additional instructions   Used for:  Ovarian cancer, left (H), Encounter for long-term (current) use of medications        Dose:  8 mg   Take 2 tablets (8 mg) by mouth every morning   Quantity:  2 tablet   Refills:  0       metFORMIN 500 MG 24 hr tablet   Commonly known as:  GLUCOPHAGE-XR   This may have changed:    - how much to take  - when to take this  - additional instructions   Used for:  Type 2 diabetes mellitus without complication, without long-term current use of insulin (H)        Take two tabs by mouth once daily with evening meal.   Quantity:  180 tablet   Refills:  PRN         CONTINUE these medicines which have NOT CHANGED        Dose / Directions    acetaminophen 325 MG tablet   Commonly known as:  TYLENOL   Used for:  Cellulitis and abscess of leg, Mass of pelvis        Dose:  650 mg   Take 2 tablets (650 mg) by mouth every 6 hours as needed for mild pain   Quantity:  100 tablet   Refills:  0       atorvastatin 80 MG tablet   Commonly known as:  LIPITOR   Used for:  Hyperlipidemia LDL goal <100        Dose:  80 mg   Take 1 tablet (80 mg) by mouth daily   Quantity:  90 tablet   Refills:  PRN       CALCIUM-MAGNESIUM-VITAMIN D PO        Dose:  2 tablet   Take 2 tablets by mouth daily   Refills:  0       EPINEPHrine 0.3 MG/0.3ML injection 2-pack   Commonly known as:  EPIPEN/ADRENACLICK/or ANY BX GENERIC EQUIV   Used for:  Bee allergy status        Dose:  0.3 mg   Inject 0.3 mLs (0.3 mg) into the muscle once as needed for anaphylaxis   Quantity:  1 each  "  Refills:  PRN       escitalopram 20 MG tablet   Commonly known as:  LEXAPRO   Used for:  Major depressive disorder, recurrent episode, mild (H)        Dose:  20 mg   Take 1 tablet (20 mg) by mouth daily   Quantity:  30 tablet   Refills:  PRN       fluticasone 50 MCG/ACT spray   Commonly known as:  FLONASE   Used for:  Chronic maxillary sinusitis        Dose:  2 spray   Spray 2 sprays into both nostrils daily   Quantity:  48 g   Refills:  1       lisinopril 10 MG tablet   Commonly known as:  PRINIVIL/ZESTRIL   Used for:  Essential hypertension        Dose:  10 mg   Take 1 tablet (10 mg) by mouth daily   Quantity:  90 tablet   Refills:  PRN       LORazepam 1 MG tablet   Commonly known as:  ATIVAN   Used for:  Ovarian cancer, left (H), Encounter for long-term (current) use of medications        Dose:  1 mg   Take 1 tablet (1 mg) by mouth every 6 hours as needed (nausea/vomiting, anxiety or sleep)   Quantity:  30 tablet   Refills:  1       MULTIVITAMIN ADULT PO        Dose:  1 tablet   Take 1 tablet by mouth daily   Refills:  0       order for DME   Used for:  Essential hypertension        Equipment being ordered: blood pressure monitor   Quantity:  1 Units   Refills:  0       * order for DME   Used for:  Long-term (current) use of anticoagulants, Acute deep vein thrombosis (DVT) of left lower extremity, unspecified vein (H)        Compression stockings 20-30 mm Hg. To be wear when directed by Hematology team and while awake for the first two years post clot.   Quantity:  1 each   Refills:  0       * order for DME   Used for:  Abscess of leg        Plain packing strip 1/4\"   Quantity:  1 Bottle   Refills:  PRN       * order for DME   Used for:  Abscess of leg        Sterile zoey tipped applicators   Quantity:  1 Box   Refills:  PRN       * order for DME   Used for:  Wound of left leg        USE 1/4 INCH  WIDTH PLAIN NU GAUZE PACKING TO DO SELF WOUND CARE OF LEFT LOWER LEG ONCE DAILY. DIMENSIONS OF WOUND PRESENTLY ARE " 2 X 2 INCH AND APPROXIMATELY 1/2 INCH DEEP.  USES 6 INCHES OF PACKING CURRENTLY FOR DRESSING CHANGE.  FAX TO [employer] [phone]   Quantity:  3 Bottle   Refills:  2       * order for DME   Used for:  Leg wound, left        STERILE ADHESIVE PAD BANDAGE AT LEAST 4X4 INCH IN SIZE NEEDED FOR DAILY WOUND CARE TO LEFT LOWER LEG. FAX TO [employer] [phone]   Quantity:  30 pad   Refills:  1       polyethylene glycol Packet   Commonly known as:  MIRALAX/GLYCOLAX   Used for:  Mass of pelvis        Dose:  17 g   Take 17 g by mouth daily   Quantity:  7 packet   Refills:  0       PRO-BIOTIC BLEND PO        Dose:  1 capsule   Take 1 capsule by mouth daily Enzymatic Therapy-probiotic pearls   Refills:  0       prochlorperazine 10 MG tablet   Commonly known as:  COMPAZINE   Used for:  Ovarian cancer, left (H), Encounter for long-term (current) use of medications        Dose:  10 mg   Take 1 tablet (10 mg) by mouth every 6 hours as needed (nausea/vomiting)   Quantity:  30 tablet   Refills:  2       rivaroxaban ANTICOAGULANT 20 MG Tabs tablet   Commonly known as:  XARELTO   Used for:  Long-term (current) use of anticoagulants, Acute deep vein thrombosis (DVT) of left lower extremity, unspecified vein (H)        Dose:  20 mg   Take 1 tablet (20 mg) by mouth daily (with dinner)   Quantity:  30 tablet   Refills:  11       senna-docusate 8.6-50 MG per tablet   Commonly known as:  SENOKOT-S;PERICOLACE   Used for:  Mass of pelvis        Dose:  2 tablet   Take 2 tablets by mouth 2 times daily Start with 1 tablet PO BID, If no bowel movement in 24 hours, increase to 2 tablets PO BID.  Hold for loose stools.   Quantity:  100 tablet   Refills:  0       traZODone 50 MG tablet   Commonly known as:  DESYREL   Used for:  Insomnia, unspecified type        TAKE 1 TO 2 TABLETS(50  MG) BY MOUTH EVERY NIGHT AS NEEDED FOR SLEEP   Quantity:  180 tablet   Refills:  1       vitamin B complex with vitamin C Tabs tablet        Dose:   1 tablet   Take 1 tablet by mouth daily   Refills:  0       vitamin D 1000 UNITS capsule        Dose:  2000 Units   Take 2,000 Units by mouth daily   Refills:  0       VYVANSE PO        Dose:  50 mg   Take 50 mg by mouth daily   Refills:  0       * Notice:  This list has 5 medication(s) that are the same as other medications prescribed for you. Read the directions carefully, and ask your doctor or other care provider to review them with you.         Where to get your medicines      Some of these will need a paper prescription and others can be bought over the counter. Ask your nurse if you have questions.     Bring a paper prescription for each of these medications     dexamethasone 4 MG tablet                Protect others around you: Learn how to safely use, store and throw away your medicines at www.disposemymeds.org.             Medication List: This is a list of all your medications and when to take them. Check marks below indicate your daily home schedule. Keep this list as a reference.      Medications           Morning Afternoon Evening Bedtime As Needed    acetaminophen 325 MG tablet   Commonly known as:  TYLENOL   Take 2 tablets (650 mg) by mouth every 6 hours as needed for mild pain                                atorvastatin 80 MG tablet   Commonly known as:  LIPITOR   Take 1 tablet (80 mg) by mouth daily   Last time this was given:  80 mg on 8/30/2017  9:58 PM                                CALCIUM-MAGNESIUM-VITAMIN D PO   Take 2 tablets by mouth daily                                dexamethasone 4 MG tablet   Commonly known as:  DECADRON   Take 2 tablets (8 mg) by mouth every morning                                EPINEPHrine 0.3 MG/0.3ML injection 2-pack   Commonly known as:  EPIPEN/ADRENACLICK/or ANY BX GENERIC EQUIV   Inject 0.3 mLs (0.3 mg) into the muscle once as needed for anaphylaxis                                escitalopram 20 MG tablet   Commonly known as:  LEXAPRO   Take 1 tablet (20 mg)  "by mouth daily   Last time this was given:  20 mg on 8/31/2017  8:05 AM                                fluticasone 50 MCG/ACT spray   Commonly known as:  FLONASE   Spray 2 sprays into both nostrils daily                                lisinopril 10 MG tablet   Commonly known as:  PRINIVIL/ZESTRIL   Take 1 tablet (10 mg) by mouth daily   Last time this was given:  10 mg on 8/30/2017  9:58 PM                                LORazepam 1 MG tablet   Commonly known as:  ATIVAN   Take 1 tablet (1 mg) by mouth every 6 hours as needed (nausea/vomiting, anxiety or sleep)                                metFORMIN 500 MG 24 hr tablet   Commonly known as:  GLUCOPHAGE-XR   Take two tabs by mouth once daily with evening meal.   Last time this was given:  1,000 mg on 8/30/2017  6:53 PM                                MULTIVITAMIN ADULT PO   Take 1 tablet by mouth daily                                order for DME   Equipment being ordered: blood pressure monitor                                * order for DME   Compression stockings 20-30 mm Hg. To be wear when directed by Hematology team and while awake for the first two years post clot.                                * order for DME   Plain packing strip 1/4\"                                * order for DME   Sterile zoey tipped applicators                                * order for DME   USE 1/4 INCH  WIDTH PLAIN NU GAUZE PACKING TO DO SELF WOUND CARE OF LEFT LOWER LEG ONCE DAILY. DIMENSIONS OF WOUND PRESENTLY ARE 2 X 2 INCH AND APPROXIMATELY 1/2 INCH DEEP.  USES 6 INCHES OF PACKING CURRENTLY FOR DRESSING CHANGE.  FAX TO [employer] [phone]                                * order for DME   STERILE ADHESIVE PAD BANDAGE AT LEAST 4X4 INCH IN SIZE NEEDED FOR DAILY WOUND CARE TO LEFT LOWER LEG. FAX TO [employer] [phone]                                polyethylene glycol Packet   Commonly known as:  MIRALAX/GLYCOLAX   Take 17 g by mouth daily                          "       PRO-BIOTIC BLEND PO   Take 1 capsule by mouth daily Enzymatic Therapy-probiotic pearls                                prochlorperazine 10 MG tablet   Commonly known as:  COMPAZINE   Take 1 tablet (10 mg) by mouth every 6 hours as needed (nausea/vomiting)   Last time this was given:  10 mg on 8/30/2017 12:51 PM                                rivaroxaban ANTICOAGULANT 20 MG Tabs tablet   Commonly known as:  XARELTO   Take 1 tablet (20 mg) by mouth daily (with dinner)   Last time this was given:  20 mg on 8/30/2017  6:53 PM                                senna-docusate 8.6-50 MG per tablet   Commonly known as:  SENOKOT-S;PERICOLACE   Take 2 tablets by mouth 2 times daily Start with 1 tablet PO BID, If no bowel movement in 24 hours, increase to 2 tablets PO BID.  Hold for loose stools.                                traZODone 50 MG tablet   Commonly known as:  DESYREL   TAKE 1 TO 2 TABLETS(50  MG) BY MOUTH EVERY NIGHT AS NEEDED FOR SLEEP   Last time this was given:  50 mg on 8/30/2017  9:58 PM                                vitamin B complex with vitamin C Tabs tablet   Take 1 tablet by mouth daily                                vitamin D 1000 UNITS capsule   Take 2,000 Units by mouth daily                                VYVANSE PO   Take 50 mg by mouth daily                                * Notice:  This list has 5 medication(s) that are the same as other medications prescribed for you. Read the directions carefully, and ask your doctor or other care provider to review them with you.

## 2017-08-30 NOTE — PROGRESS NOTES
DATE/TIME  (DOT-TD, DOT-NOW) CHEMO CHECK ACTIVITY (REGIMEN & DOSE CHECK, DAY, DOSE #, NAME OF CHEMO #1)  CHEMO DRUG #2  CHEMO DRUG #3 NAME OF RN #1 (USE DOT-ME HERE) NAME OF RN#2 (2ND RN TO LOG IN SEPARATELY)   8/30/2017  Amanda Greenwood   Taxol dose, regime labs checked Carbo dose regime labs checked  Amanda Estrada

## 2017-08-30 NOTE — DISCHARGE SUMMARY
Gynecologic Oncology Discharge Summary    Hafsa Corona  [id number]    Admit Date: 8/30/2017  Discharge Date: 8/31/2017  Admitting Provider: Dr. Gustavo Sotelo MD  Discharge Provider: Dr. 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  -Hx of hypersensitivity reaction with Taxol x 2  - anxiety    Discharge Dx:  - Stage IC2 clear cell carcinoma of the ovary  - Hx of hypersensitivity reaction with Taxol x 2  - Anxiety    Patient Active Problem List   Diagnosis     Attention deficit disorder     PANIC DISORDER      VASOMOTOR RHINITIS     Chronic Maxillary Sinusitis     Tachycardia     Type 2 diabetes mellitus without complication (H)     HYPERLIPIDEMIA LDL GOAL <100     Allergic rhinitis due to other allergen     BMI > 35     Essential hypertension     Lumbago     Major depressive disorder, recurrent episode, mild (H)     Long-term (current) use of anticoagulants [Z79.01]     Deep vein thrombosis (DVT) (H) [I82.409]     Pelvic mass in female     S/P laparotomy     Ovarian cancer, left (H)     Encounter for long-term (current) use of medications     Ovarian cancer (H)       Procedures: Taxol administration, Carboplatin administration    Prior to Admission Medications:  Prescriptions Prior to Admission   Medication Sig Dispense Refill Last Dose     dexamethasone (DECADRON) 4 MG tablet Take 5 tablets (20 mg) by mouth See Admin Instructions for 2 doses Take at 12 hours and 6 hours prior to Taxol 10 tablet 2 8/30/2017 at Unknown time     LORazepam (ATIVAN) 1 MG tablet Take 1 tablet (1 mg) by mouth every 6 hours as needed (nausea/vomiting, anxiety or sleep) 30 tablet 1 8/30/2017 at Unknown time     rivaroxaban ANTICOAGULANT (XARELTO) 20 MG TABS tablet Take 1 tablet (20 mg) by mouth daily (with dinner) 30 tablet 11 8/29/2017 at Unknown time     traZODone (DESYREL) 50 MG tablet TAKE 1 TO 2 TABLETS(50  MG) BY MOUTH EVERY NIGHT AS NEEDED FOR SLEEP 180 tablet 1 8/29/2017 at Unknown  "time     Multiple Vitamins-Minerals (MULTIVITAMIN ADULT PO) Take 1 tablet by mouth daily   8/30/2017 at Unknown time     vitamin B complex with vitamin C (VITAMIN  B COMPLEX) TABS tablet Take 1 tablet by mouth daily   8/30/2017 at Unknown time     CALCIUM-MAGNESIUM-VITAMIN D PO Take 2 tablets by mouth daily   8/29/2017 at Unknown time     Probiotic Product (PRO-BIOTIC BLEND PO) Take 1 capsule by mouth daily Enzymatic Therapy-probiotic pearls   8/29/2017 at Unknown time     atorvastatin (LIPITOR) 80 MG tablet Take 1 tablet (80 mg) by mouth daily 90 tablet PRN 8/29/2017 at Unknown time     lisinopril (PRINIVIL/ZESTRIL) 10 MG tablet Take 1 tablet (10 mg) by mouth daily 90 tablet PRN 8/29/2017 at Unknown time     metFORMIN (GLUCOPHAGE-XR) 500 MG 24 hr tablet Take two tabs by mouth once daily with evening meal. (Patient taking differently: 1,000 mg daily (with dinner) Take two tabs by mouth once daily with evening meal.) 180 tablet PRN 8/29/2017 at Unknown time     escitalopram (LEXAPRO) 20 MG tablet Take 1 tablet (20 mg) by mouth daily 30 tablet PRN 8/30/2017 at Unknown time     fluticasone (FLONASE) 50 MCG/ACT nasal spray Spray 2 sprays into both nostrils daily 48 g 1 8/30/2017 at Unknown time     order for DME STERILE ADHESIVE PAD BANDAGE AT LEAST 4X4 INCH IN SIZE NEEDED FOR DAILY WOUND CARE TO LEFT LOWER LEG.  FAX TO [facility] [phone] 30 pad 1 Taking     order for DME USE 1/4 INCH  WIDTH PLAIN NU GAUZE PACKING TO DO SELF WOUND CARE OF LEFT LOWER LEG ONCE DAILY.  DIMENSIONS OF WOUND PRESENTLY ARE 2 X 2 INCH AND APPROXIMATELY 1/2 INCH DEEP.  USES 6 INCHES OF PACKING CURRENTLY FOR DRESSING CHANGE.    FAX TO [facility] [phone] 3 Bottle 2 Taking     prochlorperazine (COMPAZINE) 10 MG tablet Take 1 tablet (10 mg) by mouth every 6 hours as needed (nausea/vomiting) (Patient not taking: Reported on 8/23/2017) 30 tablet 2 More than a month at Unknown time     order for DME Plain packing strip 1/4\" 1 " Bottle PRN Taking     order for DME Sterile zoey tipped applicators 1 Box PRN Taking     acetaminophen (TYLENOL) 325 MG tablet Take 2 tablets (650 mg) by mouth every 6 hours as needed for mild pain (Patient not taking: Reported on 8/23/2017) 100 tablet 0 Not Taking     polyethylene glycol (MIRALAX/GLYCOLAX) Packet Take 17 g by mouth daily 7 packet 0 More than a month at Unknown time     senna-docusate (SENOKOT-S;PERICOLACE) 8.6-50 MG per tablet Take 2 tablets by mouth 2 times daily Start with 1 tablet PO BID, If no bowel movement in 24 hours, increase to 2 tablets PO BID.  Hold for loose stools. (Patient not taking: Reported on 8/23/2017) 100 tablet 0 More than a month at Unknown time     Lisdexamfetamine Dimesylate (VYVANSE PO) Take 50 mg by mouth daily   More than a month at Unknown time     order for DME Compression stockings 20-30 mm Hg. To be wear when directed by Hematology team and while awake for the first two years post clot. 1 each 0 Taking     order for DME Equipment being ordered: blood pressure monitor 1 Units 0 Taking     EPINEPHrine (EPIPEN) 0.3 MG/0.3ML injection Inject 0.3 mLs (0.3 mg) into the muscle once as needed for anaphylaxis 1 each PRN Taking     Cholecalciferol (VITAMIN D) 1000 UNIT capsule Take 2,000 Units by mouth daily    Taking       Discharge Medications:   Hafsa Corona   Home Medication Instructions AMIE:[id number]    Printed on:08/31/17 1422   Medication Information                      acetaminophen (TYLENOL) 325 MG tablet  Take 2 tablets (650 mg) by mouth every 6 hours as needed for mild pain             atorvastatin (LIPITOR) 80 MG tablet  Take 1 tablet (80 mg) by mouth daily             CALCIUM-MAGNESIUM-VITAMIN D PO  Take 2 tablets by mouth daily             Cholecalciferol (VITAMIN D) 1000 UNIT capsule  Take 2,000 Units by mouth daily              dexamethasone (DECADRON) 4 MG tablet  Take 2 tablets (8 mg) by mouth every morning             EPINEPHrine (EPIPEN) 0.3 MG/0.3ML  "injection  Inject 0.3 mLs (0.3 mg) into the muscle once as needed for anaphylaxis             escitalopram (LEXAPRO) 20 MG tablet  Take 1 tablet (20 mg) by mouth daily             fluticasone (FLONASE) 50 MCG/ACT nasal spray  Spray 2 sprays into both nostrils daily             Lisdexamfetamine Dimesylate (VYVANSE PO)  Take 50 mg by mouth daily             lisinopril (PRINIVIL/ZESTRIL) 10 MG tablet  Take 1 tablet (10 mg) by mouth daily             LORazepam (ATIVAN) 1 MG tablet  Take 1 tablet (1 mg) by mouth every 6 hours as needed (nausea/vomiting, anxiety or sleep)             metFORMIN (GLUCOPHAGE-XR) 500 MG 24 hr tablet  Take two tabs by mouth once daily with evening meal.             Multiple Vitamins-Minerals (MULTIVITAMIN ADULT PO)  Take 1 tablet by mouth daily             order for DME  Equipment being ordered: blood pressure monitor             order for DME  Compression stockings 20-30 mm Hg. To be wear when directed by Hematology team and while awake for the first two years post clot.             order for DME  Plain packing strip 1/4\"             order for DME  Sterile zoey tipped applicators             order for DME  USE 1/4 INCH  WIDTH PLAIN NU GAUZE PACKING TO DO SELF WOUND CARE OF LEFT LOWER LEG ONCE DAILY.  DIMENSIONS OF WOUND PRESENTLY ARE 2 X 2 INCH AND APPROXIMATELY 1/2 INCH DEEP.  USES 6 INCHES OF PACKING CURRENTLY FOR DRESSING CHANGE.    FAX TO [facility] [phone]             order for DME  STERILE ADHESIVE PAD BANDAGE AT LEAST 4X4 INCH IN SIZE NEEDED FOR DAILY WOUND CARE TO LEFT LOWER LEG.  FAX TO [facility] [phone]             polyethylene glycol (MIRALAX/GLYCOLAX) Packet  Take 17 g by mouth daily             Probiotic Product (PRO-BIOTIC BLEND PO)  Take 1 capsule by mouth daily Enzymatic Therapy-probiotic pearls             prochlorperazine (COMPAZINE) 10 MG tablet  Take 1 tablet (10 mg) by mouth every 6 hours as needed (nausea/vomiting)             rivaroxaban " ANTICOAGULANT (XARELTO) 20 MG TABS tablet  Take 1 tablet (20 mg) by mouth daily (with dinner)             senna-docusate (SENOKOT-S;PERICOLACE) 8.6-50 MG per tablet  Take 2 tablets by mouth 2 times daily Start with 1 tablet PO BID, If no bowel movement in 24 hours, increase to 2 tablets PO BID.  Hold for loose stools.             traZODone (DESYREL) 50 MG tablet  TAKE 1 TO 2 TABLETS(50  MG) BY MOUTH EVERY NIGHT AS NEEDED FOR SLEEP             vitamin B complex with vitamin C (VITAMIN  B COMPLEX) TABS tablet  Take 1 tablet by mouth daily                   Consultations:  none    Brief History of Illness:  Hafsa Corona is a 62 year old with Stage IC2 clear cell carcinoma who presenting for Taxol desensitization and inpatient chemotherapy due to two Taxol hypersensitivity reactions in the infusion clinic.    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 Now C1D2 Carbo/Taxol.   FEN:   - She was on a regular diet throughout admission.  By discharge, she was tolerating a regular diet without nausea and vomiting and able to maintain her hydration without IVF supplementation.  Pain:   - She did not have any pain during this admission.  CV:   -  She has a history of hypertension and hyperlipidemia and was given her home Lisniopril and Lipitor while in house.  PULM:   - She has no history of pulmonary issues and had no acute pulmonary issues while in house.  HEME:   - Her preochemotherapy Hemoglobin was 10.7. She has a history lower extremity DVTs and was given her home Xarelto during this admission.  She had no other acute heme issues while in house.  GI:   - She tolerated a regular diet without nausea and vomiting.   She had no acute GI issues while in house.  :    - Throughout the admission, the patient was voiding spontaneously without difficulty.  She had no acute  issues while in house.  ID:   - The patient was  AF during her hospitalization.     ENDO:   - She has well-controlled DM Type 2 and was given her home Metformin during this admission. She was placed on SSI which she declined.  PSYCH/NEURO:   - She has a history of anxiety and was given her home Lexapro and Trazadone during this admission.  PPX:    -  She was given SCDs, and IS. She tolerated these prophylactic interventions without incident.  They were discontinued at the time of her discharge.      Discharge Instructions and Follow up:  Ms. Hafsa Corona was discharged from the hospital with follow up for CBC in 2 weeks in Cycle 2 of Carbo/Taxol in 3 weeks. Clinic RN Care Coordinator will call the patient to schedule.    Discharge Diet: Regular  Discharge Activity: Activity as tolerated  Discharge Follow up: CBC in 2 weeks, Cycle 2 Carbo/Taxol in 3 weeks.    Discharge Disposition:  Discharged to home    Discharge Staff: MD Laura Hollins MD  Gyn Onc PGY-1

## 2017-08-31 VITALS
BODY MASS INDEX: 32.09 KG/M2 | TEMPERATURE: 98 F | HEART RATE: 75 BPM | WEIGHT: 181.1 LBS | DIASTOLIC BLOOD PRESSURE: 78 MMHG | RESPIRATION RATE: 16 BRPM | OXYGEN SATURATION: 98 % | HEIGHT: 63 IN | SYSTOLIC BLOOD PRESSURE: 135 MMHG

## 2017-08-31 LAB
GLUCOSE BLDC GLUCOMTR-MCNC: 208 MG/DL (ref 70–99)
GLUCOSE SERPL-MCNC: 237 MG/DL (ref 70–99)
HBA1C MFR BLD: 6.1 % (ref 4.3–6)

## 2017-08-31 PROCEDURE — 82947 ASSAY GLUCOSE BLOOD QUANT: CPT | Performed by: OBSTETRICS & GYNECOLOGY

## 2017-08-31 PROCEDURE — 00000146 ZZHCL STATISTIC GLUCOSE BY METER IP

## 2017-08-31 PROCEDURE — 25000131 ZZH RX MED GY IP 250 OP 636 PS 637: Performed by: OBSTETRICS & GYNECOLOGY

## 2017-08-31 PROCEDURE — G0378 HOSPITAL OBSERVATION PER HR: HCPCS

## 2017-08-31 PROCEDURE — 25000132 ZZH RX MED GY IP 250 OP 250 PS 637: Performed by: OBSTETRICS & GYNECOLOGY

## 2017-08-31 PROCEDURE — 83036 HEMOGLOBIN GLYCOSYLATED A1C: CPT | Performed by: STUDENT IN AN ORGANIZED HEALTH CARE EDUCATION/TRAINING PROGRAM

## 2017-08-31 PROCEDURE — 36592 COLLECT BLOOD FROM PICC: CPT | Performed by: OBSTETRICS & GYNECOLOGY

## 2017-08-31 PROCEDURE — 25000128 H RX IP 250 OP 636: Performed by: OBSTETRICS & GYNECOLOGY

## 2017-08-31 PROCEDURE — 96411 CHEMO IV PUSH ADDL DRUG: CPT

## 2017-08-31 PROCEDURE — 25000132 ZZH RX MED GY IP 250 OP 250 PS 637: Performed by: STUDENT IN AN ORGANIZED HEALTH CARE EDUCATION/TRAINING PROGRAM

## 2017-08-31 PROCEDURE — 99225 ZZC SUBSEQUENT OBSERVATION CARE,LEVEL II: CPT | Mod: 24 | Performed by: OBSTETRICS & GYNECOLOGY

## 2017-08-31 PROCEDURE — 25000128 H RX IP 250 OP 636: Performed by: STUDENT IN AN ORGANIZED HEALTH CARE EDUCATION/TRAINING PROGRAM

## 2017-08-31 PROCEDURE — 36592 COLLECT BLOOD FROM PICC: CPT | Performed by: STUDENT IN AN ORGANIZED HEALTH CARE EDUCATION/TRAINING PROGRAM

## 2017-08-31 PROCEDURE — 96376 TX/PRO/DX INJ SAME DRUG ADON: CPT

## 2017-08-31 RX ORDER — HEPARIN SODIUM,PORCINE 10 UNIT/ML
5-10 VIAL (ML) INTRAVENOUS
Status: DISCONTINUED | OUTPATIENT
Start: 2017-08-31 | End: 2017-08-31 | Stop reason: HOSPADM

## 2017-08-31 RX ORDER — LIDOCAINE 40 MG/G
CREAM TOPICAL
Status: DISCONTINUED | OUTPATIENT
Start: 2017-08-31 | End: 2017-08-31 | Stop reason: HOSPADM

## 2017-08-31 RX ORDER — DEXAMETHASONE 4 MG/1
8 TABLET ORAL EVERY MORNING
Qty: 2 TABLET | Refills: 0 | Status: SHIPPED | OUTPATIENT
Start: 2017-08-31 | End: 2017-11-01

## 2017-08-31 RX ORDER — HEPARIN SODIUM (PORCINE) LOCK FLUSH IV SOLN 100 UNIT/ML 100 UNIT/ML
5 SOLUTION INTRAVENOUS
Status: DISCONTINUED | OUTPATIENT
Start: 2017-08-31 | End: 2017-08-31 | Stop reason: HOSPADM

## 2017-08-31 RX ORDER — HEPARIN SODIUM,PORCINE 10 UNIT/ML
5-10 VIAL (ML) INTRAVENOUS EVERY 24 HOURS
Status: DISCONTINUED | OUTPATIENT
Start: 2017-08-31 | End: 2017-08-31 | Stop reason: HOSPADM

## 2017-08-31 RX ORDER — DEXAMETHASONE SODIUM PHOSPHATE 4 MG/ML
8 INJECTION, SOLUTION INTRA-ARTICULAR; INTRALESIONAL; INTRAMUSCULAR; INTRAVENOUS; SOFT TISSUE EVERY 6 HOURS
Status: DISCONTINUED | OUTPATIENT
Start: 2017-08-31 | End: 2017-08-31 | Stop reason: HOSPADM

## 2017-08-31 RX ADMIN — CARBOPLATIN 640 MG: 10 INJECTION, SOLUTION INTRAVENOUS at 10:44

## 2017-08-31 RX ADMIN — SODIUM CHLORIDE, PRESERVATIVE FREE 5 ML: 5 INJECTION INTRAVENOUS at 14:43

## 2017-08-31 RX ADMIN — ESCITALOPRAM OXALATE 20 MG: 20 TABLET ORAL at 08:05

## 2017-08-31 RX ADMIN — DEXAMETHASONE SODIUM PHOSPHATE 8 MG: 4 INJECTION, SOLUTION INTRAMUSCULAR; INTRAVENOUS at 09:37

## 2017-08-31 RX ADMIN — DIPHENHYDRAMINE HCL 50 MG: 50 CAPSULE ORAL at 09:37

## 2017-08-31 NOTE — PLAN OF CARE
Problem: Goal Outcome Summary  Goal: Goal Outcome Summary  Outcome: Therapy, progress toward functional goals as expected  Observation status:  Yes--Chemotherapy completed and tolerated without hypersensitivity reaction.      Patient tolerated Carbo infusion well. No s/s of hypersensitivity noted. + blood return noted. Will continue to monitor.

## 2017-08-31 NOTE — PROGRESS NOTES
Gynecology Oncology Progress Note    HD#1  Date: 8/31/2017   Dz: Stage IC2 clear cell carcinoma of the ovary, Taxol hypersensitivity rxn x 2. Receiving dexamethasone and Taxol/Carbo    24 hour events:   - Received taxol overnight with no acute events.    Subjective: Doing well this morning. Was under the impression that Dr. Shaffer was going to see her at 6am. Discussed how team works and that his partner will round today. No pain. No throat itching or SOB. No CP. Urinating well.     Objective:   Vitals:    08/30/17 2207 08/30/17 2230 08/31/17 0046 08/31/17 0533   BP: 162/84 138/62 164/83 131/71   BP Location: Right arm Right arm Right arm    Pulse: 75 78     Resp: 16  16 16   Temp: 97.4  F (36.3  C)  96.7  F (35.9  C) 96.8  F (36  C)   TempSrc: Oral  Oral Oral   SpO2: 94%  95% 94%   Weight:       Height:         General: resting in bed, in NAD  CV: RRR, no m/r/g  Resp: CTAB  Abdomen: soft, non-tender, non-distended  Extremities: nontender, no edema    UOP:   I/O last 3 completed shifts:  In: 340 [IV Piggyback:340]  Out: -     I/O this shift:  In: -   Out: 500 [Urine:500]    Assessment: 62 year old HD#2 admitted for inpatient Taxol due to previous hypersensitivity reaction. Doing well, plan for discharge today.     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 Receiving dexamethasone and Taxol, plan for Carbo today.  FEN: Regular diet.  Pain: Tylenol prn  Heme: Hgb 10.7, Plt 291--ok for chemo. 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 HLD home atorvastatin ordered per patient request.  Resp: NI  GI: GERD. On home Pepcid.  : NI  MSK: NI  Neuro/Psych: Anxiety/Depression. On home Lexapro.  Endocrine: DMII. Home Metformin ordered.  ID: AF, NI  PPx: SCDs, home Xarelto  Disposition: Observation  for inpatient chemo and monitoring for hypersenstivitiy reaction    Mary Jo Sweeney MD  OBGYN PGY4  Gyn Onc Pager [phone]    Provider Disclosure:   I agree with above History, Review of Systems, Physical exam and Plan. I have reviewed the content of the documentation and have edited it as needed. I have personally performed the services documented here and the documentation accurately represents those services and the decisions I have made.     Electronically signed by:   Jarod Shaffer MD   Gynecologic Oncology   [facility]

## 2017-08-31 NOTE — PLAN OF CARE
Problem: Goal Outcome Summary  Goal: Goal Outcome Summary  Outcome: No Change  OBSERVATION STATUS GOALS:   1.) Chemotherapy completed and tolerated without hypersensitivity reaction - not met - taxol therapy still infusing, will continue to monitor for hypersensitivity reaction

## 2017-08-31 NOTE — PLAN OF CARE
Problem: Goal Outcome Summary  Goal: Goal Outcome Summary  Outcome: Therapy, progress toward functional goals as expected  Has tolerated Taxol infusion overnight, will be done at 0930. AVSS, BP 130s-160s/70-80s. Lungs clear, denies pain/nausea. Passing gas, no BM. Voiding good amts. UAL in room.   PLAN: Carbo infusion after taxol is done. Discharge after that.

## 2017-08-31 NOTE — PLAN OF CARE
Problem: Goal Outcome Summary  Goal: Goal Outcome Summary  Outcome: No Change  OBSERVATION STATUS GOALS:   1.) Chemotherapy completed and tolerated without hypersensitivity reaction - not met - Final bag of taxol started at 2130, 12 hour infusion      VSS - BP max 162/85 at time her normal home BP medication due, will continue to monitor. Port with blood return noted prior to start of each chemotherapy infusion. Up ad galileo. Denies nausea or pain. Voiding spontaneously - not saving. Pt reports no concerns regarding her bowel function. Observation status thoroughly reviewed, called insurance company and verified coverage, handout given. Will plan to discharge home after chemotherapy infusion completed.

## 2017-08-31 NOTE — PLAN OF CARE
Problem: Goal Outcome Summary  Goal: Goal Outcome Summary  Outcome: No Change  VSS. Denies pain or nausea. Tolerated regular diet. Observation status. Observation goals met. Tolerated Taxol and Carbo infusion well. Port heparin locked and de-accessed. + blood return noted. Reviewed discharge paperwork and appointments. Patient discharge to home at 1510.

## 2017-09-01 ENCOUNTER — CARE COORDINATION (OUTPATIENT)
Dept: ONCOLOGY | Facility: CLINIC | Age: 62
End: 2017-09-01

## 2017-09-01 ENCOUNTER — TELEPHONE (OUTPATIENT)
Dept: FAMILY MEDICINE | Facility: CLINIC | Age: 62
End: 2017-09-01

## 2017-09-01 ENCOUNTER — CARE COORDINATION (OUTPATIENT)
Dept: CARE COORDINATION | Facility: CLINIC | Age: 62
End: 2017-09-01

## 2017-09-01 NOTE — TELEPHONE ENCOUNTER
Please call for In Patient follow up  Chief Complaint: Ovarian Cancer, Left (H), Acute Deep Vein Thrombosis (Dvt) Of Left Lower Extremity, Unspecified Vein (H)

## 2017-09-01 NOTE — PROGRESS NOTES
"[facility]  \"Hello, my name is Rosa Gary , and I am calling from the [facility].  I want to check in and see how you are doing, after leaving the hospital.  You may also receive a call from your Care Coordinator (care team), but I want to make sure you don t have any urgent needs.  I have a couple questions to review with you:     Post-Discharge Outreach                                                    Hafsa Corona is a 62 year old female         Care Team:    Patient Care Team       Relationship Specialty Notifications Start End    Morelia Ayon NP PCP - General   8/18/05     Phone: [phone] Fax: [phone] [address] [facility] [address]    Karla Oswald, RN Continuity Care Coordinator Gyn-Onc Admissions 7/31/17     Comment:  office:  [phone]    Phone: [phone] Pager: [phone]         [facility]  care coordinator [phone]            Transition of Care Review                                                      Patient was contacted by an RN for post DC follow up so no duplicate post DC follow up call will be made, message was left with return bruno back info          Karla Oswald, RN   Oncology        Care Coordinator Note  Left message for patient that I was calling to check in on her post inpatient chemotherapy.  Also stated that I would schedule next treatment appointments on 9/25/17 and 10/16/17.  Asked that patient return my call.       Shanon Oswald MSN, RN                     Plan                                                      Thanks for your time.  Your Care Coordinator may follow-up within the next couple days.  In the meantime if you have questions, concerns or problems call your care team.        Rosa Gary    "

## 2017-09-01 NOTE — PROGRESS NOTES
Care Coordinator Note  Patient returned my call.  States she had a very good experience in the hospital with the chemo desensitization.  We reviewed plan for next chemotherapy cycles on 9/25/17 and 10/16/17.  Patient's questions regarding nausea management, body aches and hair loss answered.  Reviewed after hours contact information and patient understands to go to ER if emergent need.       Patient verbalized back understanding of the above information discussed.     Shanon Oswald MSN, RN  Care Coordinator  Gynecologic Cancer   Office:  [phone]  Pager: [phone] #[phone]

## 2017-09-01 NOTE — TELEPHONE ENCOUNTER
ED / Discharge Outreach Protocol    Patient Contact    Attempt # 1    Was call answered?  No.  Left message on voicemail with information to call me back.    Alisha Siddiqui RN

## 2017-09-01 NOTE — PROGRESS NOTES
Care Coordinator Note  Left message for patient that I was calling to check in on her post inpatient chemotherapy.  Also stated that I would schedule next treatment appointments on 9/25/17 and 10/16/17.  Asked that patient return my call.      Shanon Oswald MSN, RN  Care Coordinator  Gynecologic Cancer   Office:  [phone]  Pager: [phone] #[phone]

## 2017-09-02 ENCOUNTER — TELEPHONE (OUTPATIENT)
Dept: OTHER | Facility: CLINIC | Age: 62
End: 2017-09-02

## 2017-09-02 ENCOUNTER — NURSE TRIAGE (OUTPATIENT)
Dept: NURSING | Facility: CLINIC | Age: 62
End: 2017-09-02

## 2017-09-02 NOTE — TELEPHONE ENCOUNTER
Reason for Disposition    [1] Numbness or tingling in one or both feet AND [2] is a chronic symptom (recurrent or ongoing AND present > 4 weeks)    Additional Information    Negative: [1] SEVERE weakness (i.e., unable to walk or barely able to walk, requires support) AND [2] new onset or worsening    Negative: [1] Weakness (i.e., paralysis, loss of muscle strength) of the face, arm / hand, or leg / foot on one side of the body AND [2] sudden onset AND [3] present now    Negative: [1] Numbness (i.e., loss of sensation) of the face, arm / hand, or leg / foot on one side of the body AND [2] sudden onset AND [3] present now    Negative: [1] Loss of speech or garbled speech AND [2] sudden onset AND [3] present now    Negative: Difficult to awaken or acting confused  (e.g., disoriented, slurred speech)    Negative: Sounds like a life-threatening emergency to the triager    Negative: Confusion, disorientation, or hallucinations is main symptom    Negative: Neck pain is main symptom (and having weakness, numbness, or tingling in arm / hand because of neck pain)    Negative: Back pain is main symptom (and having weakness, numbness, or tingling in leg because of back pain)    Negative: Hand pain is main symptom (and having mild weakness, numbness, or tingling in hand related to hand pain)    Negative: Dizziness is main symptom    Negative: Vision loss or change is main symptom    Negative: Followed a head injury within last 3 days    Negative: Followed a neck injury within last 3 days    Negative: [1] Tingling in both hands and/or feet AND [2] breathing faster than normal AND [3] feels similar to prior panic attack or hyperventilation episode    Negative: Weakness in both sides of the body or weakness all over    Negative: Headache  (and neurologic deficit)    Negative: [1] Back pain AND [2] numbness (loss of sensation) in groin or rectal area    Negative: [1] Unable to urinate (or only a few drops) > 4 hours AND [2]  "bladder feels very full (e.g., palpable bladder or strong urge to urinate)    Negative: [1] Loss of control of bowel or bladder (i.e., incontinence) AND [2] new onset    Negative: [1] Weakness (i.e., paralysis, loss of muscle strength) of the face, arm / hand, or leg / foot on one side of the body AND [2] sudden onset AND [3] brief (now gone)    Negative: [1] Numbness (i.e., loss of sensation) of the face, arm / hand, or leg / foot on one side of the body AND [2] sudden onset AND [3] brief (now gone)    Negative: [1] Loss of speech or garbled speech AND [2] sudden onset AND [3] brief (now gone)    Negative: Bell's palsy suspected (i.e., weakness on only one side of the face, developing over hours to days, no other symptoms)    Negative: Patient sounds very sick or weak to the triager    Negative: Neck pain  (and neurologic deficit)    Negative: Back pain  (and neurologic deficit)    Negative: [1] Weakness of the face, arm / hand, or leg / foot on one side of the body AND [2] gradual onset (e.g., days to weeks) AND [3] present now    Negative: [1] Numbness (i.e., loss of sensation) of the face, arm / hand, or leg / foot on one side of the body AND [2] gradual onset (e.g., days to weeks) AND [3] present now    Negative: [1] Loss of speech or garbled speech AND [2] gradual onset (e.g., days to weeks) AND [3] present now    Negative: [1] Tingling (e.g., pins and needles) of the face, arm / hand, or leg / foot on one side of the body AND [2] present now    Protocols used: NEUROLOGIC DEFICIT-ADULT-AH  Patient having infusion reaction to her medication as stated by her MD per patient, 'flu-like ache and pains\" that is getting worse, and also numbness in her feet. She is asking if she can have Neurontin.  Paged on-call [facility] to [phone].  Daniela Suarez RN  [facility]    "

## 2017-09-03 NOTE — TELEPHONE ENCOUNTER
Patient called care line regarding medication question. Returned patient's phone call but she did not answer. Left message asking patient to call [phone] (7C floor) if needed.     Penny Quan MD PhD  Ob/Gyn PGY-2  9/2/2017 8:10 PM

## 2017-09-05 ENCOUNTER — HOSPITAL ENCOUNTER (OUTPATIENT)
Facility: CLINIC | Age: 62
End: 2017-09-05
Attending: OBSTETRICS & GYNECOLOGY | Admitting: OBSTETRICS & GYNECOLOGY
Payer: COMMERCIAL

## 2017-09-05 NOTE — TELEPHONE ENCOUNTER
ED / Discharge Outreach Protocol    Patient Contact    Attempt # 2    Was call answered?  No.  Left message on voicemail with information to call me back.  Alisha Siddiqui RN

## 2017-09-07 ENCOUNTER — CARE COORDINATION (OUTPATIENT)
Dept: ONCOLOGY | Facility: CLINIC | Age: 62
End: 2017-09-07

## 2017-09-07 DIAGNOSIS — C56.2 OVARIAN CANCER, LEFT (H): Primary | ICD-10-CM

## 2017-09-07 DIAGNOSIS — Z79.899 ENCOUNTER FOR LONG-TERM (CURRENT) USE OF MEDICATIONS: ICD-10-CM

## 2017-09-07 NOTE — PROGRESS NOTES
Care Coordinator Note  Returned call from patient.  Chemotherapy schedule reviewed. Patient directed to take Dexamethasone at 1 am and 7 am prior to chemo on 9/25/17 and 10/16/17.  She will also take oral Benadryl and Ativan in morning after lab draw.  Patient states she had body aches day #4 through day #7.  She managed aches with Tylenol alternating Ibuprofen, she added Gabapentin and used Ativan for sleep.  She states Ativan helped best for nausea and sleep, one night she took 2 Trazodone to get to sleep.  Patient had questions about medical marijuana.      Patient verbalized back understanding of the above information discussed.     Shanon SMITH, RN  Care Coordinator  Gynecologic Cancer   Office:  [phone]  Pager: [phone] #[phone]

## 2017-09-08 ENCOUNTER — CARE COORDINATION (OUTPATIENT)
Dept: ONCOLOGY | Facility: CLINIC | Age: 62
End: 2017-09-08

## 2017-09-08 NOTE — PROGRESS NOTES
Care Coordinator Note  Called patient with appointment time for lab draw 9/12/17.    Patient verbalized back understanding of the above information discussed.     Shanon SMITH, RN  Care Coordinator  Gynecologic Cancer   Office:  [phone]  Pager: [phone] #[phone]

## 2017-09-12 ENCOUNTER — APPOINTMENT (OUTPATIENT)
Dept: LAB | Facility: CLINIC | Age: 62
End: 2017-09-12
Attending: OBSTETRICS & GYNECOLOGY
Payer: COMMERCIAL

## 2017-09-12 ENCOUNTER — OFFICE VISIT (OUTPATIENT)
Dept: PSYCHOLOGY | Facility: CLINIC | Age: 62
End: 2017-09-12
Attending: PSYCHOLOGIST
Payer: COMMERCIAL

## 2017-09-12 ENCOUNTER — TELEPHONE (OUTPATIENT)
Dept: ONCOLOGY | Facility: CLINIC | Age: 62
End: 2017-09-12

## 2017-09-12 VITALS
RESPIRATION RATE: 16 BRPM | OXYGEN SATURATION: 97 % | WEIGHT: 180.5 LBS | HEART RATE: 94 BPM | DIASTOLIC BLOOD PRESSURE: 72 MMHG | BODY MASS INDEX: 31.97 KG/M2 | SYSTOLIC BLOOD PRESSURE: 110 MMHG | TEMPERATURE: 98.2 F

## 2017-09-12 DIAGNOSIS — D70.1 CHEMOTHERAPY-INDUCED NEUTROPENIA (H): Primary | ICD-10-CM

## 2017-09-12 DIAGNOSIS — Z79.899 ENCOUNTER FOR LONG-TERM (CURRENT) USE OF MEDICATIONS: ICD-10-CM

## 2017-09-12 DIAGNOSIS — T45.1X5A CHEMOTHERAPY-INDUCED NEUTROPENIA (H): Primary | ICD-10-CM

## 2017-09-12 DIAGNOSIS — C56.2 OVARIAN CANCER, LEFT (H): ICD-10-CM

## 2017-09-12 LAB
BASOPHILS # BLD AUTO: 0 10E9/L (ref 0–0.2)
BASOPHILS NFR BLD AUTO: 1.7 %
DIFFERENTIAL METHOD BLD: ABNORMAL
EOSINOPHIL # BLD AUTO: 0.2 10E9/L (ref 0–0.7)
EOSINOPHIL NFR BLD AUTO: 7.8 %
ERYTHROCYTE [DISTWIDTH] IN BLOOD BY AUTOMATED COUNT: 14.5 % (ref 10–15)
HCT VFR BLD AUTO: 30.3 % (ref 35–47)
HGB BLD-MCNC: 9.7 G/DL (ref 11.7–15.7)
LYMPHOCYTES # BLD AUTO: 1.6 10E9/L (ref 0.8–5.3)
LYMPHOCYTES NFR BLD AUTO: 79.2 %
MCH RBC QN AUTO: 26.9 PG (ref 26.5–33)
MCHC RBC AUTO-ENTMCNC: 32 G/DL (ref 31.5–36.5)
MCV RBC AUTO: 84 FL (ref 78–100)
MONOCYTES # BLD AUTO: 0.2 10E9/L (ref 0–1.3)
MONOCYTES NFR BLD AUTO: 8.7 %
NEUTROPHILS # BLD AUTO: 0.1 10E9/L (ref 1.6–8.3)
NEUTROPHILS NFR BLD AUTO: 2.6 %
OVALOCYTES BLD QL SMEAR: ABNORMAL
PLATELET # BLD AUTO: 175 10E9/L (ref 150–450)
POIKILOCYTOSIS BLD QL SMEAR: ABNORMAL
RBC # BLD AUTO: 3.6 10E12/L (ref 3.8–5.2)
WBC # BLD AUTO: 2 10E9/L (ref 4–11)

## 2017-09-12 PROCEDURE — 85025 COMPLETE CBC W/AUTO DIFF WBC: CPT | Performed by: OBSTETRICS & GYNECOLOGY

## 2017-09-12 PROCEDURE — 36591 DRAW BLOOD OFF VENOUS DEVICE: CPT

## 2017-09-12 PROCEDURE — 90791 PSYCH DIAGNOSTIC EVALUATION: CPT | Mod: ZP | Performed by: PSYCHOLOGIST

## 2017-09-12 PROCEDURE — 25000128 H RX IP 250 OP 636: Mod: ZF | Performed by: PSYCHOLOGIST

## 2017-09-12 RX ORDER — HEPARIN SODIUM (PORCINE) LOCK FLUSH IV SOLN 100 UNIT/ML 100 UNIT/ML
5 SOLUTION INTRAVENOUS ONCE
Status: COMPLETED | OUTPATIENT
Start: 2017-09-12 | End: 2017-09-12

## 2017-09-12 RX ADMIN — SODIUM CHLORIDE, PRESERVATIVE FREE 5 ML: 5 INJECTION INTRAVENOUS at 14:19

## 2017-09-12 ASSESSMENT — PAIN SCALES - GENERAL: PAINLEVEL: NO PAIN (0)

## 2017-09-12 NOTE — MR AVS SNAPSHOT
After Visit Summary   9/12/2017    Hafsa Corona    MRN: [id number]           Patient Information     Date Of Birth          1955        Visit Information        Provider Department      9/12/2017 3:00 PM Lola Vasquez, PhD LP;  2 [address] CONSULT [facility]        Today's Diagnoses     Ovarian cancer, left (H)        Encounter for long-term (current) use of medications           Follow-ups after your visit        Your next 10 appointments already scheduled     Sep 25, 2017  8:30 AM CDT   [facility] Draw with  [facility]RAW   [facility] ([facility])    [address]  [address] [facility] [address]   [phone]            Sep 25, 2017  9:00 AM CDT   (Arrive by 8:45 AM)   Return Active Treatment with MARLON Ogden CNP   [facility] ([facility])    [address]  [address] [facility] [address]   [phone]            Oct 06, 2017 12:00 PM CDT   (Arrive by 11:45 AM)   NEW WITH ROOM with Mariam Sams GC,  2 [address] CONSULT [facility] ([facility])    [address]  [address] [facility] [address]   [phone]            Oct 16, 2017  7:45 AM CDT   [facility] Lab Draw with  [facility] LAB DRAW   [facility] Draw ([facility])    [address]  [address] [facility] [address]   [phone]            Oct 16, 2017  8:20 AM CDT   (Arrive by 8:05 AM)   Return Active Treatment with MARLON Ogden CNP   [facility] ([facility])    [address]  2nd Floor  [facility] [address]   [phone]            Nov 14, 2017 10:00 AM CST   (Arrive by 9:45 AM)   CT CHEST W CONTRAST with UCCT1   [facility] ([facility])    [address]  [address] [facility]  [address] [phone]           Please bring any scans or X-rays taken at other hospitals, if similar tests were done. Also bring a list of your medicines, including vitamins, minerals and over-the-counter drugs. It is safest to leave personal items at home.  Be sure to tell your doctor:   If you have any allergies.   If there s any chance you are pregnant.   If you are breastfeeding.   If you have any special needs.  You will have contrast for this exam. To prepare:   Do not eat or drink for 2 hours before your exam. If you need to take medicine, you may take it with small sips of water. (We may ask you to take liquid medicine as well.)   The day before your exam, drink extra fluids at least six 8-ounce glasses (unless your doctor tells you to restrict your fluids).  Patients over 70 or patients with diabetes or kidney problems:   If you haven t had a blood test (creatinine test) within the last 30 days, go to your clinic or Diagnostic Imaging Department for this test.  If you have diabetes:   If your kidney function is normal, continue taking your metformin (Avandamet, Glucophage, Glucovance, Metaglip) on the day of your exam.   If your kidney function is abnormal, wait 48 hours before restarting this medicine.  Please wear loose clothing, such as a sweat suit or jogging clothes. Avoid snaps, zippers and other metal. We may ask you to undress and put on a hospital gown.  If you have any questions, please call the Imaging Department where you will have your exam.              Who to contact     If you have questions or need follow up information about today's clinic visit or your schedule please contact [facility] directly at [phone].  Normal or non-critical lab and imaging results will be communicated to you by MyChart, letter or phone within 4 business days after the clinic has received the results. If you do not hear from us within 7 days, please contact the clinic through [employer] or  "phone. If you have a critical or abnormal lab result, we will notify you by phone as soon as possible.  Submit refill requests through [facility] or call your pharmacy and they will forward the refill request to us. Please allow 3 business days for your refill to be completed.          Additional Information About Your Visit        [employer] Information     [facility] [employer] [facility] lets you send messages to your doctor, view your test results, renew your prescriptions, schedule appointments and more. To sign up, go to [email or web address] . Click on \"Log in\" on the left side of the screen, which will take you to the Welcome page. Then click on \"Sign up Now\" on the right side of the page.     You will be asked to enter the access code listed below, as well as some personal information. Please follow the directions to create your username and password.     Your access code is: [email or web address]  Expires: 10/10/2017 11:16 AM     Your access code will  in 90 days. If you need help or a new code, please call your [facility] clinic or [phone].        Care EveryWhere ID     This is your Care EveryWhere ID. This could be used by other organizations to access your [facility] medical records  [id number]        Your Vitals Were     Pulse Temperature Respirations Last Period Pulse Oximetry BMI (Body Mass Index)    94 98.2  F (36.8  C) (Oral) 16 2009 97% 31.97 kg/m2       Blood Pressure from Last 3 Encounters:   17 110/72   17 135/78   17 130/80    Weight from Last 3 Encounters:   17 81.9 kg (180 lb 8 oz)   17 82.1 kg (181 lb 1.6 oz)   17 82.8 kg (182 lb 8 oz)              We Performed the Following     CBC with platelets differential          Today's Medication Changes          These changes are accurate as of: 17  6:17 PM.  If you have any questions, ask your nurse or doctor.               These medicines have changed or have updated prescriptions.        Dose/Directions    metFORMIN 500 MG 24 " hr tablet   Commonly known as:  GLUCOPHAGE-XR   This may have changed:    - how much to take  - when to take this  - additional instructions   Used for:  Type 2 diabetes mellitus without complication, without long-term current use of insulin (H)        Take two tabs by mouth once daily with evening meal.   Quantity:  180 tablet   Refills:  PRN                Primary Care Provider Office Phone # Fax #    Morelia AyonATTILA [phone] [address] [facility] [address]        Equal Access to Services     RONEY ARANDA : Hadii aad ku hadasho Soomaali, waaxda luqadaha, qaybta kaalmada adeegyada, waxay idiin hayaan erika betts . So [facility] [place] [phone].    ATENCIÓN: Si habla español, tiene a martínez disposición servicios gratuitos de asistencia lingüística. [facility] [phone].    We comply with applicable federal civil rights laws and [place] laws. We do not discriminate on the basis of race, color, national origin, age, disability sex, sexual orientation or gender identity.            Thank you!     Thank you for choosing [facility]  for your care. Our goal is always to provide you with excellent care. Hearing back from our patients is one way we can continue to improve our services. Please take a few minutes to complete the written survey that you may receive in the mail after your visit with us. Thank you!             Your Updated Medication List - Protect others around you: Learn how to safely use, store and throw away your medicines at www.disposemymeds.org.          This list is accurate as of: 9/12/17  6:17 PM.  Always use your most recent med list.                   Brand Name Dispense Instructions for use Diagnosis    acetaminophen 325 MG tablet    TYLENOL    100 tablet    Take 2 tablets (650 mg) by mouth every 6 hours as needed for mild pain    Cellulitis and abscess of leg, Mass of pelvis       atorvastatin 80 MG tablet    LIPITOR    90 tablet    Take 1  "tablet (80 mg) by mouth daily    Hyperlipidemia LDL goal <100       CALCIUM-MAGNESIUM-VITAMIN D PO      Take 2 tablets by mouth daily        dexamethasone 4 MG tablet    DECADRON    2 tablet    Take 2 tablets (8 mg) by mouth every morning    Ovarian cancer, left (H), Encounter for long-term (current) use of medications       EPINEPHrine 0.3 MG/0.3ML injection 2-pack    EPIPEN/ADRENACLICK/or ANY BX GENERIC EQUIV    1 each    Inject 0.3 mLs (0.3 mg) into the muscle once as needed for anaphylaxis    Bee allergy status       escitalopram 20 MG tablet    LEXAPRO    30 tablet    Take 1 tablet (20 mg) by mouth daily    Major depressive disorder, recurrent episode, mild (H)       fluticasone 50 MCG/ACT spray    FLONASE    48 g    Spray 2 sprays into both nostrils daily    Chronic maxillary sinusitis       lisinopril 10 MG tablet    PRINIVIL/ZESTRIL    90 tablet    Take 1 tablet (10 mg) by mouth daily    Essential hypertension       LORazepam 1 MG tablet    ATIVAN    30 tablet    Take 1 tablet (1 mg) by mouth every 6 hours as needed (nausea/vomiting, anxiety or sleep)    Ovarian cancer, left (H), Encounter for long-term (current) use of medications       metFORMIN 500 MG 24 hr tablet    GLUCOPHAGE-XR    180 tablet    Take two tabs by mouth once daily with evening meal.    Type 2 diabetes mellitus without complication, without long-term current use of insulin (H)       MULTIVITAMIN ADULT PO      Take 1 tablet by mouth daily        order for DME     1 Units    Equipment being ordered: blood pressure monitor    Essential hypertension       * order for DME     1 each    Compression stockings 20-30 mm Hg. To be wear when directed by Hematology team and while awake for the first two years post clot.    Long-term (current) use of anticoagulants, Acute deep vein thrombosis (DVT) of left lower extremity, unspecified vein (H)       * order for DME     1 Bottle    Plain packing strip 1/4\"    Abscess of leg       * order for DME     1 " Box    Sterile zoey tipped applicators    Abscess of leg       * order for DME     3 Bottle    USE 1/4 INCH  WIDTH PLAIN NU GAUZE PACKING TO DO SELF WOUND CARE OF LEFT LOWER LEG ONCE DAILY. DIMENSIONS OF WOUND PRESENTLY ARE 2 X 2 INCH AND APPROXIMATELY 1/2 INCH DEEP.  USES 6 INCHES OF PACKING CURRENTLY FOR DRESSING CHANGE.  FAX TO [facility] [phone]    Wound of left leg       * order for DME     30 pad    STERILE ADHESIVE PAD BANDAGE AT LEAST 4X4 INCH IN SIZE NEEDED FOR DAILY WOUND CARE TO LEFT LOWER LEG. FAX TO [facility] [phone]    Leg wound, left       polyethylene glycol Packet    MIRALAX/GLYCOLAX    7 packet    Take 17 g by mouth daily    Mass of pelvis       PRO-BIOTIC BLEND PO      Take 1 capsule by mouth daily Enzymatic Therapy-probiotic pearls        prochlorperazine 10 MG tablet    COMPAZINE    30 tablet    Take 1 tablet (10 mg) by mouth every 6 hours as needed (nausea/vomiting)    Ovarian cancer, left (H), Encounter for long-term (current) use of medications       rivaroxaban ANTICOAGULANT 20 MG Tabs tablet    XARELTO    30 tablet    Take 1 tablet (20 mg) by mouth daily (with dinner)    Long-term (current) use of anticoagulants, Acute deep vein thrombosis (DVT) of left lower extremity, unspecified vein (H)       senna-docusate 8.6-50 MG per tablet    SENOKOT-S;PERICOLACE    100 tablet    Take 2 tablets by mouth 2 times daily Start with 1 tablet PO BID, If no bowel movement in 24 hours, increase to 2 tablets PO BID.  Hold for loose stools.    Mass of pelvis       traZODone 50 MG tablet    DESYREL    180 tablet    TAKE 1 TO 2 TABLETS(50  MG) BY MOUTH EVERY NIGHT AS NEEDED FOR SLEEP    Insomnia, unspecified type       vitamin B complex with vitamin C Tabs tablet      Take 1 tablet by mouth daily        vitamin D 1000 UNITS capsule      Take 2,000 Units by mouth daily        VYVANSE PO      Take 50 mg by mouth daily        * Notice:  This list has 5 medication(s) that are the same  as other medications prescribed for you. Read the directions carefully, and ask your doctor or other care provider to review them with you.

## 2017-09-12 NOTE — PROGRESS NOTES
"INITIAL PSYCHOLOGY INTERVIEW AND TREATMENT PLAN (seen for 50  min)  Referred by: Self  Identifying information. Ms Corona is a 64yo single woman of Yarsanism heritage.  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  Both parents are . She has 2 sisters and 2  brothers who currently she is describing as helpful, both emotionally and financially.  She has used \"long-term\" psychological counseling in the past; most recently this was terminated due to financial constraints.   Information is estimated to be valid and reliable but incomplete.  Presenting problem.  \"cancer and mental health . . several very rough years\"  History of presenting problem.   VT#1.  Coping with cancer dx and treatment.   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 . round peg in a square hole\".  Following surgery, she has just completed her 1st chemotherapy; her 2nd treatment is scheduled for later next wk.  She experienced an anaphylactic shock with the initial effort with chemotherapy and consequently will receive subsequent treatments in the hospital.  Following initial surgery she stayed many weeks for rehab at the [facility].  Currently she is aware of feeling a little apprehensive when considering the next treatment.  She highly endorses her medical providers.  In general she views her 'cancer experience' as transforming (\"psychospiritual transforming\". . angry. . No time for BS\"); in May prior to dx, she was exceedingly discouraged (\"psychic pain. . exhausted and despairing, financial struggles\"  - now she is aware of how much \"love\" surrounds her (e.g., family, friends, rabbi).  IMPRESSION.  Cancer experience (although still quite new) defined as " "transforming in meaningful ways.    VT#2.  Additional sources of stress.  Ms Corona described several sources of significant stress in addition to cancer dx/treatment:  1)  Mother's death () - \"lot of emotional abuse. . complex grief\".  2)  Mental health challenges (chronic).  She describes \"mental health challenges since I was young\".  Additionally there is \"a lot of depression and anxiety in the family. . my brother has significant mental health issues. . suspected diagnosis (at one time) was schizophrenia.  In general she describes her family as \"varela and judgmental\".  In  she had \"panic attacks\".  In , she had a \"head injury\" following which she was dx with attention deficit disorder.  In 2016 she felt \"suicidal\".  This occurred at the time she \"lost\" her house of 28 yrs.  Subsequently she has rented.    3) Loss of significant other.  Ms Coroan had an important significant relationship who  suddenly after a 5-week experience with bladder cancer, his 2nd cancer.  The couple had been dating 1 1/2yrs.  4)  Repeated financial limitations.  This included needing to sell her house of 28 years.  5)  Siblings.  Can be judgmental as well as helpful.   IMPRESSION. Multiple psychosocial stresses prior to cancer dx.  Additional family and personal history.  History incomplete.  FAMILY:  \"lot of scolding, blaming and shaming\".  Health.  Ovarian cancer, in active treatment.  Panic disorder, ADD, major depression.  Tachycardia.  Type II diabetes.  Hyperlipidemia.  BMI>35.  Essential hypertehsion.  Mental status.  Ms Corona is a casually dressed, cooperative woman who wears glasses and is somewhat over-weight.  She presents herself in a relatively quiet and soft-spoken manner, is highly spontaneous, and perceives that at this time in her life counseling would be helpful.  She occasionally was tearful.  She is comfortable sharing her history.  Thought content focused on the several years prior to her diagnosis " which were characterized by stress (e.g., loss, financial struggles) and on her diagnosis/treatment.  Affect.  Friendly, serious, occasionally tearful.  Mood. Variable - discouraged (re setbacks, struggles), resentful, cautiously hopeful.  The overall impression is of a women who is very appreciative of the support she is receiving, recognizes her mood can vary considerably, has used counseling effectively in the past, and is pro-actively seeking counseling at this time in recognition that she can become depressed and anxious.  Personal and family mental health history:  Incomplete.  Diagnosis    Axis I.  Adjustment rx (F43.23)  Axis II.  Deferred  Axis III. See above, Health.  Axis IV. Psychosocial stress:  HIGH - new cancer dx, challenging treatment, hx of mental health problems for self and in family, death of mother, multiple chronic health issues, over-wt.  Axis V.  GAF past yr:  85 GAF current:  80  PLAN.  RT 1 wk.  Will complete family and personal hx, especially mental health history at next appointment.

## 2017-09-12 NOTE — TELEPHONE ENCOUNTER
"Patient called & no answer. Message left as phone voicemail identified patient as \"Hafsa.\" Discussed neutropenia, general precautions, avoid certain foods, sick contacts and warning signs of infection as well as reasons to return. Discussed desire to have labs with repeat CBC w/ diff drawn on 9/14 and gave [facility] number to call if questions.     Karla Alarcon MD, MPH  OB/GYN Resident G4  9/12/2017 5:16 PM     "

## 2017-09-12 NOTE — NURSING NOTE
Labs drawn via port a cath.  Flushed with saline and heparin.  De-accessed and covered with gauze dressing.  VS done.  Pt tolerated well.    Idania Staples  RN

## 2017-09-13 ENCOUNTER — NURSE TRIAGE (OUTPATIENT)
Dept: NURSING | Facility: CLINIC | Age: 62
End: 2017-09-13

## 2017-09-13 ENCOUNTER — TELEPHONE (OUTPATIENT)
Dept: OTHER | Facility: CLINIC | Age: 62
End: 2017-09-13

## 2017-09-13 ENCOUNTER — CARE COORDINATION (OUTPATIENT)
Dept: ONCOLOGY | Facility: CLINIC | Age: 62
End: 2017-09-13

## 2017-09-13 DIAGNOSIS — C56.2 OVARIAN CANCER, LEFT (H): Primary | ICD-10-CM

## 2017-09-13 DIAGNOSIS — Z79.899 ENCOUNTER FOR LONG-TERM (CURRENT) USE OF MEDICATIONS: ICD-10-CM

## 2017-09-13 NOTE — TELEPHONE ENCOUNTER
Clinic Action Needed:No  Reason for Call: Hafsa called raphael after speaking to Shanon FUNES MSN, RN at clinic regarding cold like symptoms.  She is s/p chemo 2 weeks and is concerned about dry cough and swollen glands.  Reviewed Shanon's advice about resting, increase fluids and take tylenol for any aches.  She denies fever.  She is very concerned about her weakened immune system and thinks she needs to be started on an antiviral.  She wanted to speak to an on call provider regarding her further concerns.  Paged on call for GYN/ONC to speak to caller at [phone].  Rotating on call pager was paged to speak to patient directly via [employer] page , page sent at 6:00 pm.     Routed to: Not routed.    Brook Good RN  [facility]

## 2017-09-13 NOTE — TELEPHONE ENCOUNTER
"Patient states she spoke with Shanon FUNES MSN, RN Care Coordinator a short time ago and forgot to ask her if she should \"get an antiviral injection this evening as she is on chemo\".  FNA called backline at [facility] and transferred patient to nurse triage line.  "

## 2017-09-13 NOTE — PROGRESS NOTES
Care Coordinator Note  Called patient in follow up to low counts 9/12/17.  Reviewed lab results.  Patient says she has felt good until today, feels like she is getting a summer cold. States glands are slightly swollen and has nonproductive cough.  She does not have a temp.  She has been resting today.  Encouraged Tylenol for aches and to continue with rest and good fluid intake.  She understands to call if symptoms increase.  Lab and clinic appointment has been set up for Friday 9/15/17.      Patient verbalized back understanding of the above information discussed.     Shanon SMITH, RN  Care Coordinator  Gynecologic Cancer   Office:  [phone]  Pager: [phone] #[phone]

## 2017-09-13 NOTE — TELEPHONE ENCOUNTER
Pt calling because she forgot to ask the nurse about use of antiviral for the cold. Reports feeling tired, slept a lot today. Reports a dry cough and swollen glands. Denies fever or chills, shortness of breath, or wheezing. Denies sick contacts.    Discussed with pt that there is no antiviral for the common cold. If she thinks she has the flu, she will need to come in to the ED to be tested. She denies flu-like symptoms. She does not have a functioning thermometer, I encouraged her to purchase one. Instructed her to call or go to the ED if she develops fever, worsening cough, shortness of breath, or wheezing. She has a clinic appointment on Friday.     She is in agreement with plan, understands instructions.     Simran Barrios MD   Resident Physician, PGY2  Obstetrics, Gynecology, and Women's Health

## 2017-09-14 NOTE — PROGRESS NOTES
"Gynecologic Oncology Follow-Up Visit  RE: Hafsa Corona  MRN: [id number]  : 1955  Date of Visit: 09/15/2017    CC: Hafsa Corona is a 62 year old  female with a history of stage IC2 clear cell carcinoma of the ovary. She presents today for an acute visit regarding URI symptoms post-chemotherapy.    HPI:  Hafsa comes to the clinic feeling well today. She had been concerned this past week because she felt she was coming down with a cold- noticed swollen lymph nodes in her neck and felt run down. She took in elderberry, zinc, fluids, Emergen-C, and rested. She is feeling much better today but is concerned that she was neutropenic during this time as evidenced by her labs on 17. She also notes she had \"wiggly pain\" in her ankles, thighs, shoulders, and hips days 4-7 after chemo. She couldn't get comfortable standing, sitting, or sleeping. She took Tylenol and ibuprofen without relief, but took gabapentin 100mg BID she had left from her TCU stay and found this to be helpful. Noted transient neuropathy to fingers and toes. Had some constipation relieved with Smooth Move tea. She has a wound to her L upper thigh from a dog bite in  that she continues to pack- denies any changes in this. She has questions about medical cannabis for cancer related pain.    Oncology History:  The patient has had a recent episode of lower abdominal pain in early July.  She was subsequently sent to the emergency room and was found to have a large 9 cm complex ovarian mass. She was admitted to the hospital for pain control.  7/3/17:  1187  17: Exploratory laparotomy, total abdominal hysterectomy, left salpingo-oophorectomy, cancer staging including infracolic omentectomy, bilateral pelvic & para-aortic lymphadenectomy, peritoneal biopsies, evacuation of pelvic fluid by Dr. Cole.    FINAL DIAGNOSIS:   A. LEFT OVARY AND FALLOPIAN TUBE, LEFT SALPINGO-OOPHORECTOMY:   - Ovarian clear cell carcinoma   - Background of " endometriosis   - Fallopian tube with no significant histologic abnormality.   B. UTERUS, HYSTERECTOMY:   -  Inactive endometrium   -  Myometrium with adenomyosis and leiomyoma.   -  Cervix with squamous metaplasia.   C. PERITONEUM, RIGHT PELVIS, BIOPSY:   - Fibroadipose tissue, negative for malignancy.   D. LYMPH NODES, RIGHT PELVIC, DISSECTION:   - Thirteen benign lymph nodes (0/13).   E. LYMPH NODES, LEFT PELVIC, DISSECTION:   - Seven benign lymph nodes (0/7).   F. PERITONEUM, LEFT PELVIS, BIOPSY:   - Fibroadipose tissue, negative for malignancy.   G. PERITONEUM, BLADDER, BIOPSY:   - Fibroadipose tissue  with acute and chronic inflammation, negative for malignancy.   H. PERITONEUM, POSTERIOR CUL-DE-SAC, BIOPSY:   - Fibroadipose tissue, negative for malignancy.   I. PERITONEUM, LEFT PARACOLIC GUTTER, BIOPSY:   - Fibroadipose tissue, negative for malignancy.   J. LYMPH NODES, LEFT PARA-AORTIC, DISSECTION:   - Five benign lymph nodes (0/5).   K. LYMPH NODES, RIGHT PARA-AORTIC, DISSECTION:   - Two benign lymph nodes (0/2).   L. PERITONEUM, RIGHT PARACOLIC GUTTER, BIOPSY:   - Fibroadipose tissue, negative for malignancy.   M. OMENTUM, OMENTECTOMY:   - Adipose tissue with reactive changes, negative for malignancy.   COMMENT:   The final diagnosis confirms the interpretation provided intraoperatively.   Report Name: Ovary or Fallopian Tube   Status: Submitted   Part(s) Involved:   A: Ovary and fallopian tube, left   Synoptic Report:   CLINICAL   Clinical History:   - Pelvic mass   SPECIMEN   Procedure:   - Left salpingo-oophorectomy   - Hysterectomy   - Omentectomy   - Peritoneal  biopsies   Lymph Node Sampling:   - Performed   Location:   - Pelvic lymph nodes   - Para-aortic lymph nodes   Specimen Integrity:   - Left ovary   Specimen Integrity of Left Ovary:   - Capsule intact   TUMOR   Primary Tumor Site(s):   - Left ovary   Left Ovary   Tumor Size of Left Ovary: 19 cm   Histologic Type:   - Clear cell carcinoma    Tumor Extent   Ovarian Surface Involvement:   - Absent   Specimen(s)   Extent of Left Ovary:   - Involved   Extent of Left Fallopian Tube:   - Not involved   Extent of Omentum:   - Not involved   Extent of Uterus:   - Not involved   Extent of Peritoneum:   - Not involved   Peritoneal Ascitic Fluid:   - Not performed / unknown   Pleural Fluid:   - Not performed / unknown   LYMPH NODES     Number of Pelvic Lymph Nodes Examined: 20     Number of Pelvic Lymph Nodes Involved: None identified     Number of Para-aortic Lymph Nodes Examined: 7     Number of Para-Aortic Lymph Nodes Involved: None identified   STAGE (PTNM, AJCC 7TH ED.)   Primary Tumor (pT):   - Ovary   Ovarian Primary Tumor (pT):   - pT1a: Tumor limited to 1 ovary; capsule intact, no tumor on ovarian surface. No malignant cells in ascites or peritoneal washings#   Regional Lymph Nodes (pN):   - pN0: No regional lymph node metastasis      07/31/17: Dr Shaffer follow up: Discussed with the patient that given the high risk histology, as well as ruptured mass before surgery, she would be qualified at higher risk of recurrence despite early stage ovarian cancer.  Recommended adjuvant chemotherapy. Plan for carboplatin of AUC of 6 and paclitaxel of 175, m2 for 3 cycles. Referral for genetic counselor and will see her back after those 3 cycles  08/03/17: Call from patient stating she is going for a second opinion and would like chemotherapy rescheduled to week of 8/14/17.     08/16/17: Cycle #1 Carbo/Taxol.  73           Past Medical History:   Diagnosis Date     Anxiety state, unspecified     [id number]     Attention deficit disorder without mention of hyperactivity      Chronic rhinitis      Depressive disorder, not elsewhere classified      Panic disorder without agoraphobia      Pure hypercholesterolemia     Lipitor     Tachycardia, unspecified     9/1999-2/2004     Type II or unspecified type diabetes mellitus without mention of complication, not  stated as uncontrolled     diagnosed 2004     Past Surgical History:   Procedure Laterality Date     HYSTERECTOMY TOTAL ABDOMINAL, BILATERAL SALPINGO-OOPHORECTOMY, NODE DISSECTION, COMBINED Left 7/7/2017    Procedure: COMBINED HYSTERECTOMY TOTAL ABDOMINAL, SALPINGO-OOPHORECTOMY, NODE DISSECTION;  Exploratory Laparotomy, Left Salpingo-Oophorectomy, Cancer Staging, Total Hysterectomy, Omentectomy, Evacuation of abdominal fluid, Lymph Node Dissection  Anesthesia Block ;  Surgeon: Serena Cole MD;  Location: [facility] OR     HYSTERECTOMY, PAP NO LONGER INDICATED  07/07/2017    Laparotomy; for ovarian cancer staging     INSERT PORT VASCULAR ACCESS Right 8/21/2017    Procedure: INSERT PORT VASCULAR ACCESS;  Single Lumen Chest Power Port;  Surgeon: Stephen Mike PA-C;  Location: [facility] OR     LYMPHADENECTOMY RETROPERITONEAL Bilateral 07/07/2017    Laparotomy; pelvics & para-aortics; for ovarian cancer staging     OMENTECTOMY  07/07/2017    Laparotomy; for ovarian cancer staging     SALPINGO OOPHORECTOMY,R/L/KAYY Right 2008    Salpingo Oophorectomy, RT     SALPINGO OOPHORECTOMY,R/L/KAYY Left 07/07/2017    Laparotomy; for ovarian cancer staging     SURGICAL HISTORY OF -       facial surgery d/t fall in 1/2002     SURGICAL HISTORY OF -       1985 removal of breast cyst     SURGICAL HISTORY OF -   2008    endometrial ablation     Family History   Problem Relation Age of Onset     C.A.D. Father      DIABETES Father      Hypertension Father      CEREBROVASCULAR DISEASE Father      Psychotic Disorder Father      Pancreatic Cancer Father      C.A.D. Mother      Hypertension Mother      Breast Cancer Mother      Psychotic Disorder Mother      Colon Cancer Mother      CANCER Mother      Bone cancer     Prostate Problems Brother      cleared      Psychotic Disorder Sister      x2     Neurologic Disorder Sister      Psychotic Disorder Brother      x2     Depression Brother      major depressive disorder and OCD     Anxiety  Disorder Brother      MENTAL ILLNESS Brother      Psychotic Disorder Maternal Grandmother      ?     Psychotic Disorder Maternal Grandfather      ?     Psychotic Disorder Paternal Grandmother      Schizophernia     Psychotic Disorder Paternal Grandfather      ?     Respiratory Paternal Grandfather       of emphysema and smoking     Psychotic Disorder Sister      Other - See Comments Sister      small kidney stone      Cancer - colorectal No family hx of      Social History     Social History     Marital status: Single     Spouse name: N/A     Number of children: N/A     Years of education: N/A     Social History Main Topics     Smoking status: Never Smoker     Smokeless tobacco: Never Used     Alcohol use Yes      Comment: rarely     Drug use: No     Sexual activity: Yes     Birth control/ protection: None     Other Topics Concern     Not on file     Social History Narrative       Current Outpatient Prescriptions   Medication     dexamethasone (DECADRON) 4 MG tablet     order for DME     order for DME     LORazepam (ATIVAN) 1 MG tablet     prochlorperazine (COMPAZINE) 10 MG tablet     rivaroxaban ANTICOAGULANT (XARELTO) 20 MG TABS tablet     traZODone (DESYREL) 50 MG tablet     order for DME     order for DME     acetaminophen (TYLENOL) 325 MG tablet     polyethylene glycol (MIRALAX/GLYCOLAX) Packet     senna-docusate (SENOKOT-S;PERICOLACE) 8.6-50 MG per tablet     Multiple Vitamins-Minerals (MULTIVITAMIN ADULT PO)     vitamin B complex with vitamin C (VITAMIN  B COMPLEX) TABS tablet     Lisdexamfetamine Dimesylate (VYVANSE PO)     order for DME     CALCIUM-MAGNESIUM-VITAMIN D PO     Probiotic Product (PRO-BIOTIC BLEND PO)     atorvastatin (LIPITOR) 80 MG tablet     order for DME     lisinopril (PRINIVIL/ZESTRIL) 10 MG tablet     metFORMIN (GLUCOPHAGE-XR) 500 MG 24 hr tablet     escitalopram (LEXAPRO) 20 MG tablet     EPINEPHrine (EPIPEN) 0.3 MG/0.3ML injection     fluticasone (FLONASE) 50 MCG/ACT nasal spray  "    Cholecalciferol (VITAMIN D) 1000 UNIT capsule     No current facility-administered medications for this visit.         Allergies   Allergen Reactions     Wasps [Hornets] Anaphylaxis     Paclitaxel Difficulty breathing     Sulfa Drugs Hives       ROS   ROS: 10 point ROS neg other than the symptoms noted above in the HPI.          Physical Exam:    BP 96/64 (BP Location: Right arm, Patient Position: Sitting, Cuff Size: Adult Regular)  Pulse 92  Temp 97.1  F (36.2  C)  Resp 18  Ht 1.6 m (5' 3\")  Wt 81.7 kg (180 lb 1.6 oz)  LMP 01/01/2009  SpO2 92%  BMI 31.9 kg/m2    CONSTITUTIONAL: Alert non-toxic appearing female in no acute distress  HEAD: Normocephalic, atraumatic; no sinus tenderness to palpation  EYES: PERRLA, no scleral icterus, no scleral injection  ENT: EACs clear, tympanic membranes pearly gray and intact with good visualization of cone of light and bony landmarks bilaterally, nasal mucosa without edema or drainage, oropharynx pink without lesions or drainage  NECK: Neck supple without lymphadenopathy or masses  RESPIRATORY: Lungs clear to auscultation, respiratory effort unlabored  CV: Regular rate and rhythm, S1S2, no clicks, murmurs, rubs, or gallops; bilateral lower extremities without edema, dorsalis pedis pulses 2+ bilaterally  GASTROINTESTINAL: Normoactive bowel sounds x4 quadrants, abdomen soft, non-distended, and non-tender to palpation without masses or organomegaly  LYMPHATIC: Cervical, supraclavicular, and inguinal lymph nodes without lymphadenopathy  NEUROLOGIC: Grossly intact, normal gait  MUSCULOSKELETAL: Moves all extremities, no obvious muscle wasting  SKIN: Appropriate color for race, warm and dry, no rashes or lesions to unclothed skin  PSYCHIATRIC: Pleasant and interactive, affect bright, makes appropriate eye contact, thought process linear      Data:      9/15/2017   Hemoglobin 11.7 - 15.7 g/dL 9.9 (A)   Hematocrit 35.0 - 47.0 % 31.2 (A)   Platelet Count 150 - 450 10e9/L 193 "   Absolute Neutrophil 1.6 - 8.3 10e9/L 0.7 (A)   WBC 4.0 - 11.0 10e9/L 2.8 (A)     Assessment/Plan:  1.) Chemotherapy concerns: Repeat ANC= 0.7, afebrile, feeling much improved- no concerns for neutropenic infection at this time. Will add in Neulasta to her chemotherapy regimen as her labs did show a javi of ANC 0.1. 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 Given that gabapentin was helpful for her, will have her start gabapentin 100mg PO TID days 2-7 post-chemotherapy. We discussed that there are guidelines to follow and steps to go through regarding cancer related pain before we would refer to palliative for medical cannabis. To start L-glutamine and vitamin B6 for neuropathy. Discussed neutropenic precautions. To follow up with me prior to her next cycle of chemotherapy. Questions answered to the best of my ability.  2.) Patient verbalized understanding of and agreement with plan.    A total of 20 minutes face to face time spent with patient, over 50% of which was spent in counseling and coordination of care.    MARLON Ogden, FNP-C  Division of Gynecologic Oncology  [facility]  Pager: [phone]

## 2017-09-15 ENCOUNTER — ONCOLOGY VISIT (OUTPATIENT)
Dept: ONCOLOGY | Facility: CLINIC | Age: 62
End: 2017-09-15
Attending: NURSE PRACTITIONER
Payer: COMMERCIAL

## 2017-09-15 ENCOUNTER — TELEPHONE (OUTPATIENT)
Dept: ONCOLOGY | Facility: CLINIC | Age: 62
End: 2017-09-15

## 2017-09-15 ENCOUNTER — APPOINTMENT (OUTPATIENT)
Dept: LAB | Facility: CLINIC | Age: 62
End: 2017-09-15
Attending: OBSTETRICS & GYNECOLOGY
Payer: COMMERCIAL

## 2017-09-15 VITALS
BODY MASS INDEX: 31.91 KG/M2 | HEIGHT: 63 IN | HEART RATE: 92 BPM | TEMPERATURE: 97.1 F | RESPIRATION RATE: 18 BRPM | OXYGEN SATURATION: 92 % | WEIGHT: 180.1 LBS | SYSTOLIC BLOOD PRESSURE: 96 MMHG | DIASTOLIC BLOOD PRESSURE: 64 MMHG

## 2017-09-15 DIAGNOSIS — Z79.899 ENCOUNTER FOR LONG-TERM (CURRENT) USE OF MEDICATIONS: ICD-10-CM

## 2017-09-15 DIAGNOSIS — T45.1X5A PERIPHERAL NEUROPATHY DUE TO CHEMOTHERAPY (H): ICD-10-CM

## 2017-09-15 DIAGNOSIS — C56.2 OVARIAN CANCER, LEFT (H): Primary | ICD-10-CM

## 2017-09-15 DIAGNOSIS — M25.50 PAIN IN JOINT, MULTIPLE SITES: ICD-10-CM

## 2017-09-15 DIAGNOSIS — T45.1X5A CHEMOTHERAPY-INDUCED NEUTROPENIA (H): ICD-10-CM

## 2017-09-15 DIAGNOSIS — D70.1 CHEMOTHERAPY-INDUCED NEUTROPENIA (H): ICD-10-CM

## 2017-09-15 DIAGNOSIS — G62.0 PERIPHERAL NEUROPATHY DUE TO CHEMOTHERAPY (H): ICD-10-CM

## 2017-09-15 LAB
BASOPHILS # BLD AUTO: 0 10E9/L (ref 0–0.2)
BASOPHILS NFR BLD AUTO: 0.7 %
DIFFERENTIAL METHOD BLD: ABNORMAL
EOSINOPHIL # BLD AUTO: 0.1 10E9/L (ref 0–0.7)
EOSINOPHIL NFR BLD AUTO: 2.5 %
ERYTHROCYTE [DISTWIDTH] IN BLOOD BY AUTOMATED COUNT: 14.9 % (ref 10–15)
HCT VFR BLD AUTO: 31.2 % (ref 35–47)
HGB BLD-MCNC: 9.9 G/DL (ref 11.7–15.7)
IMM GRANULOCYTES # BLD: 0 10E9/L (ref 0–0.4)
IMM GRANULOCYTES NFR BLD: 0.4 %
LYMPHOCYTES # BLD AUTO: 1.5 10E9/L (ref 0.8–5.3)
LYMPHOCYTES NFR BLD AUTO: 54.4 %
MCH RBC QN AUTO: 27.3 PG (ref 26.5–33)
MCHC RBC AUTO-ENTMCNC: 31.7 G/DL (ref 31.5–36.5)
MCV RBC AUTO: 86 FL (ref 78–100)
MONOCYTES # BLD AUTO: 0.5 10E9/L (ref 0–1.3)
MONOCYTES NFR BLD AUTO: 15.9 %
NEUTROPHILS # BLD AUTO: 0.7 10E9/L (ref 1.6–8.3)
NEUTROPHILS NFR BLD AUTO: 26.1 %
NRBC # BLD AUTO: 0 10*3/UL
NRBC BLD AUTO-RTO: 0 /100
PLATELET # BLD AUTO: 193 10E9/L (ref 150–450)
RBC # BLD AUTO: 3.63 10E12/L (ref 3.8–5.2)
WBC # BLD AUTO: 2.8 10E9/L (ref 4–11)

## 2017-09-15 PROCEDURE — 99213 OFFICE O/P EST LOW 20 MIN: CPT | Mod: ZP | Performed by: NURSE PRACTITIONER

## 2017-09-15 PROCEDURE — 85025 COMPLETE CBC W/AUTO DIFF WBC: CPT | Performed by: NURSE PRACTITIONER

## 2017-09-15 PROCEDURE — 99212 OFFICE O/P EST SF 10 MIN: CPT | Mod: ZF

## 2017-09-15 PROCEDURE — 25000128 H RX IP 250 OP 636: Mod: ZF | Performed by: NURSE PRACTITIONER

## 2017-09-15 PROCEDURE — 36591 DRAW BLOOD OFF VENOUS DEVICE: CPT

## 2017-09-15 RX ORDER — HEPARIN SODIUM (PORCINE) LOCK FLUSH IV SOLN 100 UNIT/ML 100 UNIT/ML
5 SOLUTION INTRAVENOUS EVERY 8 HOURS PRN
Status: DISCONTINUED | OUTPATIENT
Start: 2017-09-15 | End: 2017-09-15 | Stop reason: HOSPADM

## 2017-09-15 RX ORDER — LORATADINE 10 MG/1
10 TABLET ORAL DAILY
Qty: 30 TABLET | Refills: 1 | Status: SHIPPED | OUTPATIENT
Start: 2017-09-15 | End: 2018-10-23

## 2017-09-15 RX ORDER — GABAPENTIN 100 MG/1
100 CAPSULE ORAL 3 TIMES DAILY
Qty: 21 CAPSULE | Refills: 1 | Status: SHIPPED | OUTPATIENT
Start: 2017-09-15 | End: 2017-10-16

## 2017-09-15 RX ADMIN — SODIUM CHLORIDE, PRESERVATIVE FREE 5 ML: 5 INJECTION INTRAVENOUS at 10:31

## 2017-09-15 ASSESSMENT — PAIN SCALES - GENERAL: PAINLEVEL: NO PAIN (0)

## 2017-09-15 NOTE — PATIENT INSTRUCTIONS
"Signs and symptoms of neuropathy include numbness, tingling, burning, or heaviness of the extremities, typically in a \"stocking/glove\" distribution of the feet and arms. It is important to take care of extremities by doing such things as wearing gloves or boots in cold weather and protecting skin from extreme temperatures, adjusting hot water heater to 105-120 degrees, checking hands and feet daily for skin breakdown, and wearing hard soled protective shoes. Alcohol may worsen symptoms of neuropathy and should be avoided. Neuropathy puts patients at risks for falls. Floor rugs may cause trips and falls and should be avoided. Night lights may help prevent falls at night. Vitamin B6 50mg twice daily and L-glutamine 2000mg twice daily may help decrease neuropathy symptoms during active chemotherapy treatment. Call clinic if you have worsening numbness, tingling, pain, or functional ability as treatment may need to be adjusted.    Take Claritin daily and take gabapentin 100mg three times daily the day after chemotherapy for a week to minimize your pain with the Taxol. Continue with Tylenol but stop the ibuprofen.    We will add Neulasta to your regimen to prevent severe neutropenia        Neutropenia  White blood cells (WBCs) help protect the body from infection. Neutrophils are a type of white blood cell. Their main job is to help the body fight bacterial and fungal infections. Neutropenia occurs when there are fewer neutrophils in the blood than normal. It can range from mild to severe. This depends on the number of neutrophils in the blood. Severe neutropenia puts a person at higher risk for having more infections. Bacterial and fungal infections are most common. Your doctor can tell you more about your condition and whether it needs to be treated.    What causes neutropenia?  There are 2 main types of neutropenia: congenital and acquired. Each type has many causes:    Congenital neutropenia. These are the types that " are present at birth. They are caused by certain rare genetic conditions, such as Kostmann s syndrome. Most often the neurtropenia is mild and normal for certain ethnic groups.    Acquired neutropenia. This type is not present at birth. Causes include:    Certain medicines, such as antibiotics and chemotherapy drugs    Certain autoimmune conditions    Certain viral, bacterial, or parasitic infections    Too little folate or vitamin B12 in the diet    Underlying bone marrow problem, such as leukemia or myelodysplastic syndrome (MDS)    Other causes  How is neutropenia diagnosed?  Your healthcare provider may check for neutropenia if you have frequent infections. Your provider may also check for neutropenia if you re having certain treatments, such as chemotherapy, which is known to cause a lower neutrophil count. Tests will be done to confirm the problem. These may include:    A complete blood cell count (CBC). This test measures the amounts of the different types of cells in your blood. This includes the WBCs. The WBC count can be broken down further to find the number of neutrophils and immature neutrophils (bands) in your blood. This is called an absolute neutrophil count (ANC).    A blood smear. This test checks for the different types of blood cells in your blood and how they appear. A sample of your blood is spread on a glass slide and viewed under a microscope. A stain is used so the blood cells can be seen.    A bone marrow aspiration and biopsy. This test checks for problems with how your bone marrow makes blood cells. A needle is used to remove a sample of the bone marrow in your hipbone. The sample is then sent to a lab to be tested for problems.  How is neutropenia treated?    If there is a clear cause of neutropenia, it is addressed. For instance, if a medicine is the cause, it may be stopped or changed.    For mild cases, often no treatment is needed.    For moderate to severe cases, treatment is likely  needed. This may include:    G-CSF (granulocyte-colony stimulating factor). This is a special type of protein. It helps promote the growth and activity of neutrophils. G-CSF is given by injection.    Bone marrow transplant. This treatment replaces diseased bone marrow cells with healthy cells from a matched donor. This treatment is only done in specific severe cases.  What is the long-term outcome of neutropenia?  The outcome of neutropenia varies for each person. For some people, neutropenia may resolve after a few weeks or months. For other people, it may be long-lasting. In these cases, ongoing care and treatment is needed. Your healthcare provider will talk to you more about what to expect from your condition.  When to call your healthcare provider  Call your healthcare provider right away if you have any of the following:    Fever of 100.4 F (38 C) or higher (call 911 or 24-hour urgent care -- this is especially important if you have severe neutropenia, which puts you at higher risk for life-threatening infection)    Cold sweat or chills    Chest pain or trouble breathing    Sore throat    Extreme tiredness or fatigue    Nausea and vomiting    Redness, warmth, or drainage from any open cuts or wounds    Pain or burning with urination; frequent urination    Pain, burning, or bleeding in the rectum    Severe constipation or diarrhea    Bloody stool or urine    How can I prevent infections?  With neutropenia, you need to take extra care to protect yourself from infection. Be sure to:    Wash your hands often, especially before eating and after using the bathroom. Use warm water and soap. Or use a hand gel that contains at least 60% alcohol.    Avoid close contact with others who may be ill.    Clean items you use often with disinfectant wipes. This includes phones and computer keyboards.    Avoid touching your eyes, nose, and mouth, especially if your hands are not clean.    Practice good oral hygiene. Use a soft  toothbrush. Also, brush and floss your teeth gently.    Always wipe from front to back after a bowel movement.    Keep cuts and scrapes clean and covered until they heal.    Avoid sharing items such as towels, toothbrushes, razors, clothing, and sports equipment.    Store and handle foods safely to prevent food-borne illness.    Ask your healthcare provider if you need to take antibiotics before and after having any dental or medical procedures.    Ask your healthcare provider if you need to wear a special mask near construction sites or farm areas.  Date Last Reviewed: 12/1/2016 2000-2017 [employer]. [address] [place] [address], [place] [address]. All rights reserved. This information is not intended as a substitute for professional medical care. Always follow your healthcare professional's instructions.

## 2017-09-15 NOTE — LETTER
"9/15/2017       RE: Hafsa Corona  [address]  [place] [address]     Dear Colleague,    Thank you for referring your patient, Hafsa Corona, to the [facility]. Please see a copy of my visit note below.    Gynecologic Oncology Follow-Up Visit  RE: Hafsa Corona  MRN: [id number]  : 1955  Date of Visit: 09/15/2017    CC: Hafsa Corona is a 62 year old  female with a history of stage IC2 clear cell carcinoma of the ovary. She presents today for an acute visit regarding URI symptoms post-chemotherapy.    HPI:  Hafsa comes to the clinic feeling well today. She had been concerned this past week because she felt she was coming down with a cold- noticed swollen lymph nodes in her neck and felt run down. She took in elderberry, zinc, fluids, Emergen-C, and rested. She is feeling much better today but is concerned that she was neutropenic during this time as evidenced by her labs on 17. She also notes she had \"wiggly pain\" in her ankles, thighs, shoulders, and hips days 4-7 after chemo. She couldn't get comfortable standing, sitting, or sleeping. She took Tylenol and ibuprofen without relief, but took gabapentin 100mg BID she had left from her TCU stay and found this to be helpful. Noted transient neuropathy to fingers and toes. Had some constipation relieved with Smooth Move tea. She has a wound to her L upper thigh from a dog bite in  that she continues to pack- denies any changes in this. She has questions about medical cannabis for cancer related pain.    Oncology History:  The patient has had a recent episode of lower abdominal pain in early July.  She was subsequently sent to the emergency room and was found to have a large 9 cm complex ovarian mass. She was admitted to the hospital for pain control.  7/3/17:  1187  17: Exploratory laparotomy, total abdominal hysterectomy, left salpingo-oophorectomy, cancer staging including infracolic omentectomy, bilateral " pelvic & para-aortic lymphadenectomy, peritoneal biopsies, evacuation of pelvic fluid by Dr. Cole.    FINAL DIAGNOSIS:   A. LEFT OVARY AND FALLOPIAN TUBE, LEFT SALPINGO-OOPHORECTOMY:   - Ovarian clear cell carcinoma   - Background of endometriosis   - Fallopian tube with no significant histologic abnormality.   B. UTERUS, HYSTERECTOMY:   -  Inactive endometrium   -  Myometrium with adenomyosis and leiomyoma.   -  Cervix with squamous metaplasia.   C. PERITONEUM, RIGHT PELVIS, BIOPSY:   - Fibroadipose tissue, negative for malignancy.   D. LYMPH NODES, RIGHT PELVIC, DISSECTION:   - Thirteen benign lymph nodes (0/13).   E. LYMPH NODES, LEFT PELVIC, DISSECTION:   - Seven benign lymph nodes (0/7).   F. PERITONEUM, LEFT PELVIS, BIOPSY:   - Fibroadipose tissue, negative for malignancy.   G. PERITONEUM, BLADDER, BIOPSY:   - Fibroadipose tissue  with acute and chronic inflammation, negative for malignancy.   H. PERITONEUM, POSTERIOR CUL-DE-SAC, BIOPSY:   - Fibroadipose tissue, negative for malignancy.   I. PERITONEUM, LEFT PARACOLIC GUTTER, BIOPSY:   - Fibroadipose tissue, negative for malignancy.   J. LYMPH NODES, LEFT PARA-AORTIC, DISSECTION:   - Five benign lymph nodes (0/5).   K. LYMPH NODES, RIGHT PARA-AORTIC, DISSECTION:   - Two benign lymph nodes (0/2).   L. PERITONEUM, RIGHT PARACOLIC GUTTER, BIOPSY:   - Fibroadipose tissue, negative for malignancy.   M. OMENTUM, OMENTECTOMY:   - Adipose tissue with reactive changes, negative for malignancy.   COMMENT:   The final diagnosis confirms the interpretation provided intraoperatively.   Report Name: Ovary or Fallopian Tube   Status: Submitted   Part(s) Involved:   A: Ovary and fallopian tube, left   Synoptic Report:   CLINICAL   Clinical History:   - Pelvic mass   SPECIMEN   Procedure:   - Left salpingo-oophorectomy   - Hysterectomy   - Omentectomy   - Peritoneal  biopsies   Lymph Node Sampling:   - Performed   Location:   - Pelvic lymph nodes   - Para-aortic lymph nodes    Specimen Integrity:   - Left ovary   Specimen Integrity of Left Ovary:   - Capsule intact   TUMOR   Primary Tumor Site(s):   - Left ovary   Left Ovary   Tumor Size of Left Ovary: 19 cm   Histologic Type:   - Clear cell carcinoma   Tumor Extent   Ovarian Surface Involvement:   - Absent   Specimen(s)   Extent of Left Ovary:   - Involved   Extent of Left Fallopian Tube:   - Not involved   Extent of Omentum:   - Not involved   Extent of Uterus:   - Not involved   Extent of Peritoneum:   - Not involved   Peritoneal Ascitic Fluid:   - Not performed / unknown   Pleural Fluid:   - Not performed / unknown   LYMPH NODES     Number of Pelvic Lymph Nodes Examined: 20     Number of Pelvic Lymph Nodes Involved: None identified     Number of Para-aortic Lymph Nodes Examined: 7     Number of Para-Aortic Lymph Nodes Involved: None identified   STAGE (PTNM, AJCC 7TH ED.)   Primary Tumor (pT):   - Ovary   Ovarian Primary Tumor (pT):   - pT1a: Tumor limited to 1 ovary; capsule intact, no tumor on ovarian surface. No malignant cells in ascites or peritoneal washings#   Regional Lymph Nodes (pN):   - pN0: No regional lymph node metastasis      07/31/17: Dr Shaffer follow up: Discussed with the patient that given the high risk histology, as well as ruptured mass before surgery, she would be qualified at higher risk of recurrence despite early stage ovarian cancer.  Recommended adjuvant chemotherapy. Plan for carboplatin of AUC of 6 and paclitaxel of 175, m2 for 3 cycles. Referral for genetic counselor and will see her back after those 3 cycles  08/03/17: Call from patient stating she is going for a second opinion and would like chemotherapy rescheduled to week of 8/14/17.     08/16/17: Cycle #1 Carbo/Taxol.  73           Past Medical History:   Diagnosis Date     Anxiety state, unspecified     [id number]     Attention deficit disorder without mention of hyperactivity      Chronic rhinitis      Depressive disorder, not elsewhere  classified      Panic disorder without agoraphobia      Pure hypercholesterolemia     Lipitor     Tachycardia, unspecified     9/1999-2/2004     Type II or unspecified type diabetes mellitus without mention of complication, not stated as uncontrolled     diagnosed 2004     Past Surgical History:   Procedure Laterality Date     HYSTERECTOMY TOTAL ABDOMINAL, BILATERAL SALPINGO-OOPHORECTOMY, NODE DISSECTION, COMBINED Left 7/7/2017    Procedure: COMBINED HYSTERECTOMY TOTAL ABDOMINAL, SALPINGO-OOPHORECTOMY, NODE DISSECTION;  Exploratory Laparotomy, Left Salpingo-Oophorectomy, Cancer Staging, Total Hysterectomy, Omentectomy, Evacuation of abdominal fluid, Lymph Node Dissection  Anesthesia Block ;  Surgeon: Serena Cole MD;  Location: [facility] OR     HYSTERECTOMY, PAP NO LONGER INDICATED  07/07/2017    Laparotomy; for ovarian cancer staging     INSERT PORT VASCULAR ACCESS Right 8/21/2017    Procedure: INSERT PORT VASCULAR ACCESS;  Single Lumen Chest Power Port;  Surgeon: Stephen Mike PA-C;  Location: [facility] OR     LYMPHADENECTOMY RETROPERITONEAL Bilateral 07/07/2017    Laparotomy; pelvics & para-aortics; for ovarian cancer staging     OMENTECTOMY  07/07/2017    Laparotomy; for ovarian cancer staging     SALPINGO OOPHORECTOMY,R/L/KAYY Right 2008    Salpingo Oophorectomy, RT     SALPINGO OOPHORECTOMY,R/L/KAYY Left 07/07/2017    Laparotomy; for ovarian cancer staging     SURGICAL HISTORY OF -       facial surgery d/t fall in 1/2002     SURGICAL HISTORY OF -       1985 removal of breast cyst     SURGICAL HISTORY OF -   2008    endometrial ablation     Family History   Problem Relation Age of Onset     C.A.D. Father      DIABETES Father      Hypertension Father      CEREBROVASCULAR DISEASE Father      Psychotic Disorder Father      Pancreatic Cancer Father      C.A.D. Mother      Hypertension Mother      Breast Cancer Mother      Psychotic Disorder Mother      Colon Cancer Mother      CANCER Mother      Bone cancer      Prostate Problems Brother      cleared      Psychotic Disorder Sister      x2     Neurologic Disorder Sister      Psychotic Disorder Brother      x2     Depression Brother      major depressive disorder and OCD     Anxiety Disorder Brother      MENTAL ILLNESS Brother      Psychotic Disorder Maternal Grandmother      ?     Psychotic Disorder Maternal Grandfather      ?     Psychotic Disorder Paternal Grandmother      Schizophernia     Psychotic Disorder Paternal Grandfather      ?     Respiratory Paternal Grandfather       of emphysema and smoking     Psychotic Disorder Sister      Other - See Comments Sister      small kidney stone      Cancer - colorectal No family hx of      Social History     Social History     Marital status: Single     Spouse name: N/A     Number of children: N/A     Years of education: N/A     Social History Main Topics     Smoking status: Never Smoker     Smokeless tobacco: Never Used     Alcohol use Yes      Comment: rarely     Drug use: No     Sexual activity: Yes     Birth control/ protection: None     Other Topics Concern     Not on file     Social History Narrative       Current Outpatient Prescriptions   Medication     dexamethasone (DECADRON) 4 MG tablet     order for DME     order for DME     LORazepam (ATIVAN) 1 MG tablet     prochlorperazine (COMPAZINE) 10 MG tablet     rivaroxaban ANTICOAGULANT (XARELTO) 20 MG TABS tablet     traZODone (DESYREL) 50 MG tablet     order for DME     order for DME     acetaminophen (TYLENOL) 325 MG tablet     polyethylene glycol (MIRALAX/GLYCOLAX) Packet     senna-docusate (SENOKOT-S;PERICOLACE) 8.6-50 MG per tablet     Multiple Vitamins-Minerals (MULTIVITAMIN ADULT PO)     vitamin B complex with vitamin C (VITAMIN  B COMPLEX) TABS tablet     Lisdexamfetamine Dimesylate (VYVANSE PO)     order for DME     CALCIUM-MAGNESIUM-VITAMIN D PO     Probiotic Product (PRO-BIOTIC BLEND PO)     atorvastatin (LIPITOR) 80 MG tablet     order for DME      "lisinopril (PRINIVIL/ZESTRIL) 10 MG tablet     metFORMIN (GLUCOPHAGE-XR) 500 MG 24 hr tablet     escitalopram (LEXAPRO) 20 MG tablet     EPINEPHrine (EPIPEN) 0.3 MG/0.3ML injection     fluticasone (FLONASE) 50 MCG/ACT nasal spray     Cholecalciferol (VITAMIN D) 1000 UNIT capsule     No current facility-administered medications for this visit.         Allergies   Allergen Reactions     Wasps [Hornets] Anaphylaxis     Paclitaxel Difficulty breathing     Sulfa Drugs Hives       ROS   ROS: 10 point ROS neg other than the symptoms noted above in the HPI.          Physical Exam:    BP 96/64 (BP Location: Right arm, Patient Position: Sitting, Cuff Size: Adult Regular)  Pulse 92  Temp 97.1  F (36.2  C)  Resp 18  Ht 1.6 m (5' 3\")  Wt 81.7 kg (180 lb 1.6 oz)  LMP 01/01/2009  SpO2 92%  BMI 31.9 kg/m2    CONSTITUTIONAL: Alert non-toxic appearing female in no acute distress  HEAD: Normocephalic, atraumatic; no sinus tenderness to palpation  EYES: PERRLA, no scleral icterus, no scleral injection  ENT: EACs clear, tympanic membranes pearly gray and intact with good visualization of cone of light and bony landmarks bilaterally, nasal mucosa without edema or drainage, oropharynx pink without lesions or drainage  NECK: Neck supple without lymphadenopathy or masses  RESPIRATORY: Lungs clear to auscultation, respiratory effort unlabored  CV: Regular rate and rhythm, S1S2, no clicks, murmurs, rubs, or gallops; bilateral lower extremities without edema, dorsalis pedis pulses 2+ bilaterally  GASTROINTESTINAL: Normoactive bowel sounds x4 quadrants, abdomen soft, non-distended, and non-tender to palpation without masses or organomegaly  LYMPHATIC: Cervical, supraclavicular, and inguinal lymph nodes without lymphadenopathy  NEUROLOGIC: Grossly intact, normal gait  MUSCULOSKELETAL: Moves all extremities, no obvious muscle wasting  SKIN: Appropriate color for race, warm and dry, no rashes or lesions to unclothed skin  PSYCHIATRIC: " Pleasant and interactive, affect bright, makes appropriate eye contact, thought process linear      Data:      9/15/2017   Hemoglobin 11.7 - 15.7 g/dL 9.9 (A)   Hematocrit 35.0 - 47.0 % 31.2 (A)   Platelet Count 150 - 450 10e9/L 193   Absolute Neutrophil 1.6 - 8.3 10e9/L 0.7 (A)   WBC 4.0 - 11.0 10e9/L 2.8 (A)     Assessment/Plan:  1.) Chemotherapy concerns: Repeat ANC= 0.7, afebrile, feeling much improved- no concerns for neutropenic infection at this time. Will add in Neulasta to her chemotherapy regimen as her labs did show a javi of ANC 0.1. 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 Given that gabapentin was helpful for her, will have her start gabapentin 100mg PO TID days 2-7 post-chemotherapy. We discussed that there are guidelines to follow and steps to go through regarding cancer related pain before we would refer to palliative for medical cannabis. To start L-glutamine and vitamin B6 for neuropathy. Discussed neutropenic precautions. To follow up with me prior to her next cycle of chemotherapy. Questions answered to the best of my ability.  2.) Patient verbalized understanding of and agreement with plan.    A total of 20 minutes face to face time spent with patient, over 50% of which was spent in counseling and coordination of care.    MARLON Ogden, FNP-C  Division of Gynecologic Oncology  [facility]  Pager: [phone]     Neutropenia improving- continue neutropenic precautions, will add Neulasta to her plan. Augustina Noriega FNP-C

## 2017-09-15 NOTE — PROGRESS NOTES
Neutropenia improving- continue neutropenic precautions, will add Neulasta to her plan. Augustina PLAZAP-C

## 2017-09-15 NOTE — NURSING NOTE
"Oncology Rooming Note    September 15, 2017 11:05 AM   Hafsa Corona is a 62 year old female who presents for:    Chief Complaint   Patient presents with     Port Flush     Lab drawn via port, line flushed and hep locked. Incorrect lab tube drawn. Correct lab then drawn via .     Blood Draw     Lab drawn via  by RN.     Oncology Clinic Visit     return patient visit for follow up related to ovarian cancer     Initial Vitals: BP 96/64 (BP Location: Right arm, Patient Position: Sitting, Cuff Size: Adult Regular)  Pulse 92  Temp 97.1  F (36.2  C)  Resp 18  Ht 1.6 m (5' 3\")  Wt 81.7 kg (180 lb 1.6 oz)  LMP 01/01/2009  SpO2 92%  BMI 31.9 kg/m2 Estimated body mass index is 31.9 kg/(m^2) as calculated from the following:    Height as of this encounter: 1.6 m (5' 3\").    Weight as of this encounter: 81.7 kg (180 lb 1.6 oz). Body surface area is 1.91 meters squared.  No Pain (0) Comment: Data Unavailable   Patient's last menstrual period was 01/01/2009.  Allergies reviewed: Yes  Medications reviewed: Yes    Medications: Medication refills not needed today.  Pharmacy name entered into [facility]:    [id number] - [place], [place] - [address] AT [facility] - [place], [place] - [address]    Clinical concerns: no concerns yoko was notified.    5 minutes for nursing intake (face to face time)     Esther Choi CMA              "

## 2017-09-15 NOTE — TELEPHONE ENCOUNTER
Oncology Nutrition:  Reason for Contact:  Called pt to follow up today from 8/24 -  reporting concerns about her weight loss (8) on the Oncology Distress Screening tool.   '8/24: she reported that he had lost ~26 lbs since July 7th when she had surgery.  She is pleased with her weight loss, however, more recently has been concerned as her weight loss has not stabilized going into chemo (carbo/taxol).  She starts chemo tomorrow.    She is focused on protein (consuming protein at each meal).  She has also been maximizing calories but admits that the calories are coming mostly from sweets.    She expresses her appreciation for the phone call and reports that 'it's timely'.  She is not interested in setting up an appointment with RD at this time but has requested RD to call back in 3-4 weeks.  She has RD contact info and will call RD as needed. '     RD called today to check in on her appetite, intake and weight trends.  Encouraged pt to reach out to RD prn.     Contact information provided.       Iris Garces RD, LD

## 2017-09-15 NOTE — MR AVS SNAPSHOT
"              After Visit Summary   9/15/2017    Hafsa Corona    MRN: [id number]           Patient Information     Date Of Birth          1955        Visit Information        Provider Department      9/15/2017 11:00 AM Augustina Noriega APRN 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 It is important to take care of extremities by doing such things as wearing gloves or boots in cold weather and protecting skin from extreme temperatures, adjusting hot water heater to 105-120 degrees, checking hands and feet daily for skin breakdown, and wearing hard soled protective shoes. Alcohol may worsen symptoms of neuropathy and should be avoided. Neuropathy puts patients at risks for falls. Floor rugs may cause trips and falls and should be avoided. Night lights may help prevent falls at night. Vitamin B6 50mg twice daily and L-glutamine 2000mg twice daily may help decrease neuropathy symptoms during active chemotherapy treatment. Call clinic if you have worsening numbness, tingling, pain, or functional ability as treatment may need to be adjusted.    Take Claritin daily and take gabapentin 100mg three times daily the day after chemotherapy for a week to minimize your pain with the Taxol. Continue with Tylenol but stop the ibuprofen.    We will add Neulasta to your regimen to prevent severe neutropenia        Neutropenia  White blood cells (WBCs) help protect the body from infection. Neutrophils are a type of white blood cell. Their main job is to help the body fight bacterial and " fungal infections. Neutropenia occurs when there are fewer neutrophils in the blood than normal. It can range from mild to severe. This depends on the number of neutrophils in the blood. Severe neutropenia puts a person at higher risk for having more infections. Bacterial and fungal infections are most common. Your doctor can tell you more about your condition and whether it needs to be treated.    What causes neutropenia?  There are 2 main types of neutropenia: congenital and acquired. Each type has many causes:    Congenital neutropenia. These are the types that are present at birth. They are caused by certain rare genetic conditions, such as Kostmann s syndrome. Most often the neurtropenia is mild and normal for certain ethnic groups.    Acquired neutropenia. This type is not present at birth. Causes include:    Certain medicines, such as antibiotics and chemotherapy drugs    Certain autoimmune conditions    Certain viral, bacterial, or parasitic infections    Too little folate or vitamin B12 in the diet    Underlying bone marrow problem, such as leukemia or myelodysplastic syndrome (MDS)    Other causes  How is neutropenia diagnosed?  Your healthcare provider may check for neutropenia if you have frequent infections. Your provider may also check for neutropenia if you re having certain treatments, such as chemotherapy, which is known to cause a lower neutrophil count. Tests will be done to confirm the problem. These may include:    A complete blood cell count (CBC). This test measures the amounts of the different types of cells in your blood. This includes the WBCs. The WBC count can be broken down further to find the number of neutrophils and immature neutrophils (bands) in your blood. This is called an absolute neutrophil count (ANC).    A blood smear. This test checks for the different types of blood cells in your blood and how they appear. A sample of your blood is spread on a glass slide and viewed under a  microscope. A stain is used so the blood cells can be seen.    A bone marrow aspiration and biopsy. This test checks for problems with how your bone marrow makes blood cells. A needle is used to remove a sample of the bone marrow in your hipbone. The sample is then sent to a lab to be tested for problems.  How is neutropenia treated?    If there is a clear cause of neutropenia, it is addressed. For instance, if a medicine is the cause, it may be stopped or changed.    For mild cases, often no treatment is needed.    For moderate to severe cases, treatment is likely needed. This may include:    G-CSF (granulocyte-colony stimulating factor). This is a special type of protein. It helps promote the growth and activity of neutrophils. G-CSF is given by injection.    Bone marrow transplant. This treatment replaces diseased bone marrow cells with healthy cells from a matched donor. This treatment is only done in specific severe cases.  What is the long-term outcome of neutropenia?  The outcome of neutropenia varies for each person. For some people, neutropenia may resolve after a few weeks or months. For other people, it may be long-lasting. In these cases, ongoing care and treatment is needed. Your healthcare provider will talk to you more about what to expect from your condition.  When to call your healthcare provider  Call your healthcare provider right away if you have any of the following:    Fever of 100.4 F (38 C) or higher (call 911 or 24-hour urgent care -- this is especially important if you have severe neutropenia, which puts you at higher risk for life-threatening infection)    Cold sweat or chills    Chest pain or trouble breathing    Sore throat    Extreme tiredness or fatigue    Nausea and vomiting    Redness, warmth, or drainage from any open cuts or wounds    Pain or burning with urination; frequent urination    Pain, burning, or bleeding in the rectum    Severe constipation or diarrhea    Bloody stool or  urine    How can I prevent infections?  With neutropenia, you need to take extra care to protect yourself from infection. Be sure to:    Wash your hands often, especially before eating and after using the bathroom. Use warm water and soap. Or use a hand gel that contains at least 60% alcohol.    Avoid close contact with others who may be ill.    Clean items you use often with disinfectant wipes. This includes phones and computer keyboards.    Avoid touching your eyes, nose, and mouth, especially if your hands are not clean.    Practice good oral hygiene. Use a soft toothbrush. Also, brush and floss your teeth gently.    Always wipe from front to back after a bowel movement.    Keep cuts and scrapes clean and covered until they heal.    Avoid sharing items such as towels, toothbrushes, razors, clothing, and sports equipment.    Store and handle foods safely to prevent food-borne illness.    Ask your healthcare provider if you need to take antibiotics before and after having any dental or medical procedures.    Ask your healthcare provider if you need to wear a special mask near construction sites or farm areas.  Date Last Reviewed: 12/1/2016 2000-2017 [employer]. [address] [place] [address]. All rights reserved. This information is not intended as a substitute for professional medical care. Always follow your healthcare professional's instructions.                Follow-ups after your visit        Your next 10 appointments already scheduled     Sep 25, 2017  8:30 AM CDT   [facility] Lab Draw with  [facility] DRAW   [facility] Lab Draw ([facility])    [address] [place] [id number] [phone]            Sep 25, 2017  9:00 AM CDT   (Arrive by 8:45 AM)   Return Active Treatment with MARLON Ogden CNP   [facility] ([facility])    [address] [place] [address]  [place] [address]   [phone]            Oct 06, 2017 12:00 PM CDT   (Arrive by 11:45 AM)   NEW WITH ROOM with Mariam Sams GC,  2 114 CONSULT [facility] ([facility])    [address] [place] [address]  2nd [facility] [address]   [phone]            Oct 16, 2017  7:45 AM CDT   [facility] Lab Draw with [facility] LAB DRAW   [facility] Lab Draw ([facility])    [address] [place] [address]  2[address] [facility] [address]   [phone]            Oct 16, 2017  8:20 AM CDT   (Arrive by 8:05 AM)   Return Active Treatment with MARLON Ogden CNP   [facility] ([facility])    [address] [place] [address]  2[address] [facility] [address]   [phone]            Oct 26, 2017  2:00 PM CDT   (Arrive by 1:45 PM)   RETURN WITH ROOM with Lola Vasquez, PhD ,  2 118 CONSULT [facility] ([facility])    [address] [place] [address]  2nd [facility] [address]   [phone]            Nov 14, 2017 10:00 AM CST   (Arrive by 9:45 AM)   CT CHEST W CONTRAST with UCCT1   [facility] ([facility])    [address] [place] [address]  1st Floor  [facility] [address]   [phone]           Please bring any scans or X-rays taken at other hospitals, if similar tests were done. Also bring a list of your medicines, including vitamins, minerals and over-the-counter drugs. It is safest to leave personal items at home.  Be sure to tell your doctor:   If you have any allergies.   If there s any chance you are pregnant.   If you are breastfeeding.   If you have any special needs.  You will have contrast for this exam. To prepare:   Do not eat or drink for 2 hours before your exam. If you need to take medicine, you may take it with small sips of water. (We may ask you to take liquid medicine as well.)   The day before your exam, drink  "extra fluids at least six 8-ounce glasses (unless your doctor tells you to restrict your fluids).  Patients over 70 or patients with diabetes or kidney problems:   If you haven t had a blood test (creatinine test) within the last 30 days, go to your clinic or Diagnostic Imaging Department for this test.  If you have diabetes:   If your kidney function is normal, continue taking your metformin (Avandamet, Glucophage, Glucovance, Metaglip) on the day of your exam.   If your kidney function is abnormal, wait 48 hours before restarting this medicine.  Please wear loose clothing, such as a sweat suit or jogging clothes. Avoid snaps, zippers and other metal. We may ask you to undress and put on a hospital gown.  If you have any questions, please call the Imaging Department where you will have your exam.              Who to contact     If you have questions or need follow up information about today's clinic visit or your schedule please contact [facility] directly at [phone].  Normal or non-critical lab and imaging results will be communicated to you by [employer], letter or phone within 4 business days after the clinic has received the results. If you do not hear from us within 7 days, please contact the clinic through [employer] or phone. If you have a critical or abnormal lab result, we will notify you by phone as soon as possible.  Submit refill requests through [employer] or call your pharmacy and they will forward the refill request to us. Please allow 3 business days for your refill to be completed.          Additional Information About Your Visit        [employer]eting Information     [employer] lets you send messages to your doctor, view your test results, renew your prescriptions, schedule appointments and more. To sign up, go to [email or web address] . Click on \"Log in\" on the left side of the screen, which will take you to the Welcome page. Then click on \"Sign up Now\" on the right side of the page. " "    You will be asked to enter the access code listed below, as well as some personal information. Please follow the directions to create your username and password.     Your access code is: [email or web address]  Expires: 10/10/2017 11:16 AM     Your access code will  in 90 days. If you need help or a new code, please call your [place] clinic or [phone].        Care EveryWhere ID     This is your Care EveryWhere ID. This could be used by other organizations to access your [place] medical records  [id number]        Your Vitals Were     Pulse Temperature Respirations Height Last Period Pulse Oximetry    92 97.1  F (36.2  C) 18 1.6 m (5' 3\") 2009 92%    BMI (Body Mass Index)                   31.9 kg/m2            Blood Pressure from Last 3 Encounters:   09/15/17 96/64   17 110/72   17 135/78    Weight from Last 3 Encounters:   09/15/17 81.7 kg (180 lb 1.6 oz)   17 81.9 kg (180 lb 8 oz)   17 82.1 kg (181 lb 1.6 oz)              We Performed the Following     CBC with platelets differential          Today's Medication Changes          These changes are accurate as of: 9/15/17  3:59 PM.  If you have any questions, ask your nurse or doctor.               Start taking these medicines.        Dose/Directions    gabapentin 100 MG capsule   Commonly known as:  NEURONTIN   Used for:  Pain in joint, multiple sites   Started by:  Augustina Noriega APRN CNP        Dose:  100 mg   Take 1 capsule (100 mg) by mouth 3 times daily Start the day after chemo for one week   Quantity:  21 capsule   Refills:  1       loratadine 10 MG tablet   Commonly known as:  CLARITIN   Used for:  Pain in joint, multiple sites   Started by:  Augustina Noriega APRN CNP        Dose:  10 mg   Take 1 tablet (10 mg) by mouth daily   Quantity:  30 tablet   Refills:  1         These medicines have changed or have updated prescriptions.        Dose/Directions    metFORMIN 500 MG 24 hr tablet   Commonly known as:  " GLUCOPHAGE-XR   This may have changed:    - how much to take  - when to take this  - additional instructions   Used for:  Type 2 diabetes mellitus without complication, without long-term current use of insulin (H)        Take two tabs by mouth once daily with evening meal.   Quantity:  180 tablet   Refills:  PRN            Where to get your medicines      These medications were sent to [place], [place] - [address]  [address], [facility] [address]    Hours:  TRANSPLANT PHONE NUMBER [phone] Phone:  [phone]     gabapentin 100 MG capsule    loratadine 10 MG tablet                Primary Care Provider Office Phone # Fax #    [place] HERRERA ATTILA Ayon [phone] [address] [facility] [address]        Equal Access to Services     RONEY ARANDA : Hadii serena joyner hadasho Soomaali, waaxda luqadaha, qaybta kaalmada adeegyada, sujatha friedman. So [facility] [phone].    ATENCIÓN: Si habla español, tiene a martínez disposición servicios gratuitos de asistencia lingüística. Elizabeth al [phone].    We comply with applicable federal civil rights laws and [place] laws. We do not discriminate on the basis of race, color, national origin, age, disability sex, sexual orientation or gender identity.            Thank you!     Thank you for choosing [facility]  for your care. Our goal is always to provide you with excellent care. Hearing back from our patients is one way we can continue to improve our services. Please take a few minutes to complete the written survey that you may receive in the mail after your visit with us. Thank you!             Your Updated Medication List - Protect others around you: Learn how to safely use, store and throw away your medicines at www.disposemymeds.org.          This list is accurate as of: 9/15/17  3:59 PM.  Always use your most recent med list.                    Brand Name Dispense Instructions for use Diagnosis    acetaminophen 325 MG tablet    TYLENOL    100 tablet    Take 2 tablets (650 mg) by mouth every 6 hours as needed for mild pain    Cellulitis and abscess of leg, Mass of pelvis       atorvastatin 80 MG tablet    LIPITOR    90 tablet    Take 1 tablet (80 mg) by mouth daily    Hyperlipidemia LDL goal <100       CALCIUM-MAGNESIUM-VITAMIN D PO      Take 2 tablets by mouth daily        dexamethasone 4 MG tablet    DECADRON    2 tablet    Take 2 tablets (8 mg) by mouth every morning    Ovarian cancer, left (H), Encounter for long-term (current) use of medications       EPINEPHrine 0.3 MG/0.3ML injection 2-pack    EPIPEN/ADRENACLICK/or ANY BX GENERIC EQUIV    1 each    Inject 0.3 mLs (0.3 mg) into the muscle once as needed for anaphylaxis    Bee allergy status       escitalopram 20 MG tablet    LEXAPRO    30 tablet    Take 1 tablet (20 mg) by mouth daily    Major depressive disorder, recurrent episode, mild (H)       fluticasone 50 MCG/ACT spray    FLONASE    48 g    Spray 2 sprays into both nostrils daily    Chronic maxillary sinusitis       gabapentin 100 MG capsule    NEURONTIN    21 capsule    Take 1 capsule (100 mg) by mouth 3 times daily Start the day after chemo for one week    Pain in joint, multiple sites       lisinopril 10 MG tablet    PRINIVIL/ZESTRIL    90 tablet    Take 1 tablet (10 mg) by mouth daily    Essential hypertension       loratadine 10 MG tablet    CLARITIN    30 tablet    Take 1 tablet (10 mg) by mouth daily    Pain in joint, multiple sites       LORazepam 1 MG tablet    ATIVAN    30 tablet    Take 1 tablet (1 mg) by mouth every 6 hours as needed (nausea/vomiting, anxiety or sleep)    Ovarian cancer, left (H), Encounter for long-term (current) use of medications       metFORMIN 500 MG 24 hr tablet    GLUCOPHAGE-XR    180 tablet    Take two tabs by mouth once daily with evening meal.    Type 2 diabetes mellitus without complication, without  "long-term current use of insulin (H)       MULTIVITAMIN ADULT PO      Take 1 tablet by mouth daily        order for DME     1 Units    Equipment being ordered: blood pressure monitor    Essential hypertension       * order for DME     1 each    Compression stockings 20-30 mm Hg. To be wear when directed by Hematology team and while awake for the first two years post clot.    Long-term (current) use of anticoagulants, Acute deep vein thrombosis (DVT) of left lower extremity, unspecified vein (H)       * order for DME     1 Bottle    Plain packing strip 1/4\"    Abscess of leg       * order for DME     1 Box    Sterile zoey tipped applicators    Abscess of leg       * order for DME     3 Bottle    USE 1/4 INCH  WIDTH PLAIN NU GAUZE PACKING TO DO SELF WOUND CARE OF LEFT LOWER LEG ONCE DAILY. DIMENSIONS OF WOUND PRESENTLY ARE 2 X 2 INCH AND APPROXIMATELY 1/2 INCH DEEP.  USES 6 INCHES OF PACKING CURRENTLY FOR DRESSING CHANGE.  FAX TO [facility] [phone]    Wound of left leg       * order for DME     30 pad    STERILE ADHESIVE PAD BANDAGE AT LEAST 4X4 INCH IN SIZE NEEDED FOR DAILY WOUND CARE TO LEFT LOWER LEG. [phone]    Leg wound, left       polyethylene glycol Packet    MIRALAX/GLYCOLAX    7 packet    Take 17 g by mouth daily    Mass of pelvis       PRO-BIOTIC BLEND PO      Take 1 capsule by mouth daily Enzymatic Therapy-probiotic pearls        prochlorperazine 10 MG tablet    COMPAZINE    30 tablet    Take 1 tablet (10 mg) by mouth every 6 hours as needed (nausea/vomiting)    Ovarian cancer, left (H), Encounter for long-term (current) use of medications       rivaroxaban ANTICOAGULANT 20 MG Tabs tablet    XARELTO    30 tablet    Take 1 tablet (20 mg) by mouth daily (with dinner)    Long-term (current) use of anticoagulants, Acute deep vein thrombosis (DVT) of left lower extremity, unspecified vein (H)       senna-docusate 8.6-50 MG per tablet    SENOKOT-S;PERICOLACE    100 tablet "    Take 2 tablets by mouth 2 times daily Start with 1 tablet PO BID, If no bowel movement in 24 hours, increase to 2 tablets PO BID.  Hold for loose stools.    Mass of pelvis       traZODone 50 MG tablet    DESYREL    180 tablet    TAKE 1 TO 2 TABLETS(50  MG) BY MOUTH EVERY NIGHT AS NEEDED FOR SLEEP    Insomnia, unspecified type       vitamin B complex with vitamin C Tabs tablet      Take 1 tablet by mouth daily        vitamin D 1000 UNITS capsule      Take 2,000 Units by mouth daily        VYVANSE PO      Take 50 mg by mouth daily        * Notice:  This list has 5 medication(s) that are the same as other medications prescribed for you. Read the directions carefully, and ask your doctor or other care provider to review them with you.

## 2017-09-20 ENCOUNTER — CARE COORDINATION (OUTPATIENT)
Dept: ONCOLOGY | Facility: CLINIC | Age: 62
End: 2017-09-20

## 2017-09-20 NOTE — PROGRESS NOTES
Care Coordinator Note  Call from patient to review her appointment times for Monday 9/25/17.  Patient will plan to take her Dexamethasone premedication at 1 am and 7 am.  Patient has [employer] forms for her employer that she understands will be reviewed at her appointment on Monday.      Patient verbalized back understanding of the above information discussed.     Shanon SMITH, RN  Care Coordinator  Gynecologic Cancer   Office:  [phone]  Pager: [phone] #[phone]

## 2017-09-22 DIAGNOSIS — C56.2 OVARIAN CANCER, LEFT (H): Primary | ICD-10-CM

## 2017-09-22 DIAGNOSIS — Z79.899 ENCOUNTER FOR LONG-TERM (CURRENT) USE OF MEDICATIONS: ICD-10-CM

## 2017-09-25 ENCOUNTER — ONCOLOGY VISIT (OUTPATIENT)
Dept: ONCOLOGY | Facility: CLINIC | Age: 62
End: 2017-09-25
Attending: NURSE PRACTITIONER
Payer: COMMERCIAL

## 2017-09-25 ENCOUNTER — CARE COORDINATION (OUTPATIENT)
Dept: ONCOLOGY | Facility: CLINIC | Age: 62
End: 2017-09-25

## 2017-09-25 ENCOUNTER — HOSPITAL ENCOUNTER (OUTPATIENT)
Facility: CLINIC | Age: 62
Setting detail: OBSERVATION
Discharge: HOME | End: 2017-09-26
Attending: OBSTETRICS & GYNECOLOGY | Admitting: OBSTETRICS & GYNECOLOGY
Payer: COMMERCIAL

## 2017-09-25 ENCOUNTER — APPOINTMENT (OUTPATIENT)
Dept: LAB | Facility: CLINIC | Age: 62
End: 2017-09-25
Attending: OBSTETRICS & GYNECOLOGY
Payer: COMMERCIAL

## 2017-09-25 VITALS
HEART RATE: 101 BPM | OXYGEN SATURATION: 96 % | DIASTOLIC BLOOD PRESSURE: 76 MMHG | HEIGHT: 63 IN | SYSTOLIC BLOOD PRESSURE: 120 MMHG | BODY MASS INDEX: 32.25 KG/M2 | WEIGHT: 182 LBS | TEMPERATURE: 97.9 F

## 2017-09-25 DIAGNOSIS — D70.1 CHEMOTHERAPY-INDUCED NEUTROPENIA (H): ICD-10-CM

## 2017-09-25 DIAGNOSIS — Z79.899 ENCOUNTER FOR LONG-TERM (CURRENT) USE OF MEDICATIONS: ICD-10-CM

## 2017-09-25 DIAGNOSIS — T45.1X5A CHEMOTHERAPY-INDUCED NEUTROPENIA (H): ICD-10-CM

## 2017-09-25 DIAGNOSIS — C56.2 OVARIAN CANCER, LEFT (H): Primary | ICD-10-CM

## 2017-09-25 DIAGNOSIS — I82.402 ACUTE DEEP VEIN THROMBOSIS (DVT) OF LEFT LOWER EXTREMITY, UNSPECIFIED VEIN (H): ICD-10-CM

## 2017-09-25 DIAGNOSIS — C56.2 OVARIAN CANCER, LEFT (H): ICD-10-CM

## 2017-09-25 LAB
ALBUMIN SERPL-MCNC: 3.5 G/DL (ref 3.4–5)
ALP SERPL-CCNC: 99 U/L (ref 40–150)
ALT SERPL W P-5'-P-CCNC: 19 U/L (ref 0–50)
ANION GAP SERPL CALCULATED.3IONS-SCNC: 8 MMOL/L (ref 3–14)
AST SERPL W P-5'-P-CCNC: 15 U/L (ref 0–45)
AT III ACT/NOR PPP CHRO: 115 % (ref 85–135)
BASOPHILS # BLD AUTO: 0 10E9/L (ref 0–0.2)
BASOPHILS NFR BLD AUTO: 0.2 %
BILIRUB SERPL-MCNC: 0.4 MG/DL (ref 0.2–1.3)
BUN SERPL-MCNC: 21 MG/DL (ref 7–30)
CALCIUM SERPL-MCNC: 9.4 MG/DL (ref 8.5–10.1)
CANCER AG125 SERPL-ACNC: 15 U/ML (ref 0–30)
CARDIOLIPIN ANTIBODY IGG: <1.6 GPL-U/ML (ref 0–19.9)
CARDIOLIPIN ANTIBODY IGM: 0.5 MPL-U/ML (ref 0–19.9)
CHLORIDE SERPL-SCNC: 106 MMOL/L (ref 94–109)
CO2 SERPL-SCNC: 25 MMOL/L (ref 20–32)
CREAT SERPL-MCNC: 0.71 MG/DL (ref 0.52–1.04)
DIFFERENTIAL METHOD BLD: ABNORMAL
EOSINOPHIL # BLD AUTO: 0 10E9/L (ref 0–0.7)
EOSINOPHIL NFR BLD AUTO: 0 %
ERYTHROCYTE [DISTWIDTH] IN BLOOD BY AUTOMATED COUNT: 15.9 % (ref 10–15)
GFR SERPL CREATININE-BSD FRML MDRD: 83 ML/MIN/1.7M2
GLUCOSE BLDC GLUCOMTR-MCNC: 184 MG/DL (ref 70–99)
GLUCOSE BLDC GLUCOMTR-MCNC: 218 MG/DL (ref 70–99)
GLUCOSE SERPL-MCNC: 181 MG/DL (ref 70–99)
HCT VFR BLD AUTO: 32.6 % (ref 35–47)
HGB BLD-MCNC: 10.4 G/DL (ref 11.7–15.7)
IMM GRANULOCYTES # BLD: 0 10E9/L (ref 0–0.4)
IMM GRANULOCYTES NFR BLD: 0.2 %
LYMPHOCYTES # BLD AUTO: 0.9 10E9/L (ref 0.8–5.3)
LYMPHOCYTES NFR BLD AUTO: 19 %
MAGNESIUM SERPL-MCNC: 2 MG/DL (ref 1.6–2.3)
MCH RBC QN AUTO: 27.4 PG (ref 26.5–33)
MCHC RBC AUTO-ENTMCNC: 31.9 G/DL (ref 31.5–36.5)
MCV RBC AUTO: 86 FL (ref 78–100)
MONOCYTES # BLD AUTO: 0.1 10E9/L (ref 0–1.3)
MONOCYTES NFR BLD AUTO: 1 %
NEUTROPHILS # BLD AUTO: 4 10E9/L (ref 1.6–8.3)
NEUTROPHILS NFR BLD AUTO: 79.6 %
NRBC # BLD AUTO: 0 10*3/UL
NRBC BLD AUTO-RTO: 0 /100
PLATELET # BLD AUTO: 261 10E9/L (ref 150–450)
POTASSIUM SERPL-SCNC: 4.2 MMOL/L (ref 3.4–5.3)
PROT SERPL-MCNC: 7.6 G/DL (ref 6.8–8.8)
RBC # BLD AUTO: 3.8 10E12/L (ref 3.8–5.2)
SODIUM SERPL-SCNC: 140 MMOL/L (ref 133–144)
WBC # BLD AUTO: 5 10E9/L (ref 4–11)

## 2017-09-25 PROCEDURE — 99214 OFFICE O/P EST MOD 30 MIN: CPT | Mod: ZP | Performed by: NURSE PRACTITIONER

## 2017-09-25 PROCEDURE — 86146 BETA-2 GLYCOPROTEIN ANTIBODY: CPT | Performed by: NURSE PRACTITIONER

## 2017-09-25 PROCEDURE — 85303 CLOT INHIBIT PROT C ACTIVITY: CPT | Performed by: NURSE PRACTITIONER

## 2017-09-25 PROCEDURE — G0378 HOSPITAL OBSERVATION PER HR: HCPCS

## 2017-09-25 PROCEDURE — 25000131 ZZH RX MED GY IP 250 OP 636 PS 637: Performed by: NURSE PRACTITIONER

## 2017-09-25 PROCEDURE — 25000125 ZZHC RX 250: Performed by: NURSE PRACTITIONER

## 2017-09-25 PROCEDURE — 96372 THER/PROPH/DIAG INJ SC/IM: CPT | Mod: XS

## 2017-09-25 PROCEDURE — 85300 ANTITHROMBIN III ACTIVITY: CPT | Performed by: NURSE PRACTITIONER

## 2017-09-25 PROCEDURE — 00000167 ZZHCL STATISTIC INR NC: Performed by: NURSE PRACTITIONER

## 2017-09-25 PROCEDURE — 85613 RUSSELL VIPER VENOM DILUTED: CPT | Performed by: NURSE PRACTITIONER

## 2017-09-25 PROCEDURE — 25000128 H RX IP 250 OP 636

## 2017-09-25 PROCEDURE — 96372 THER/PROPH/DIAG INJ SC/IM: CPT

## 2017-09-25 PROCEDURE — 96375 TX/PRO/DX INJ NEW DRUG ADDON: CPT

## 2017-09-25 PROCEDURE — 85730 THROMBOPLASTIN TIME PARTIAL: CPT | Performed by: NURSE PRACTITIONER

## 2017-09-25 PROCEDURE — 25000128 H RX IP 250 OP 636: Performed by: NURSE PRACTITIONER

## 2017-09-25 PROCEDURE — 96376 TX/PRO/DX INJ SAME DRUG ADON: CPT

## 2017-09-25 PROCEDURE — 25000132 ZZH RX MED GY IP 250 OP 250 PS 637: Performed by: NURSE PRACTITIONER

## 2017-09-25 PROCEDURE — 25000132 ZZH RX MED GY IP 250 OP 250 PS 637: Performed by: OBSTETRICS & GYNECOLOGY

## 2017-09-25 PROCEDURE — 36591 DRAW BLOOD OFF VENOUS DEVICE: CPT

## 2017-09-25 PROCEDURE — 99212 OFFICE O/P EST SF 10 MIN: CPT | Mod: ZF

## 2017-09-25 PROCEDURE — 25000128 H RX IP 250 OP 636: Performed by: OBSTETRICS & GYNECOLOGY

## 2017-09-25 PROCEDURE — 00000146 ZZHCL STATISTIC GLUCOSE BY METER IP

## 2017-09-25 PROCEDURE — 99212 OFFICE O/P EST SF 10 MIN: CPT

## 2017-09-25 PROCEDURE — 86304 IMMUNOASSAY TUMOR CA 125: CPT | Performed by: NURSE PRACTITIONER

## 2017-09-25 PROCEDURE — 96411 CHEMO IV PUSH ADDL DRUG: CPT

## 2017-09-25 PROCEDURE — 96409 CHEMO IV PUSH SNGL DRUG: CPT

## 2017-09-25 PROCEDURE — 85025 COMPLETE CBC W/AUTO DIFF WBC: CPT | Performed by: NURSE PRACTITIONER

## 2017-09-25 PROCEDURE — 25000128 H RX IP 250 OP 636: Mod: ZF | Performed by: NURSE PRACTITIONER

## 2017-09-25 PROCEDURE — 86147 CARDIOLIPIN ANTIBODY EA IG: CPT | Performed by: NURSE PRACTITIONER

## 2017-09-25 PROCEDURE — 83735 ASSAY OF MAGNESIUM: CPT | Performed by: NURSE PRACTITIONER

## 2017-09-25 PROCEDURE — 00000401 ZZHCL STATISTIC THROMBIN TIME NC: Performed by: NURSE PRACTITIONER

## 2017-09-25 PROCEDURE — 85306 CLOT INHIBIT PROT S FREE: CPT | Performed by: NURSE PRACTITIONER

## 2017-09-25 PROCEDURE — 80053 COMPREHEN METABOLIC PANEL: CPT | Performed by: NURSE PRACTITIONER

## 2017-09-25 PROCEDURE — 85307 ASSAY ACTIVATED PROTEIN C: CPT | Performed by: NURSE PRACTITIONER

## 2017-09-25 RX ORDER — DEXTROSE MONOHYDRATE 25 G/50ML
25-50 INJECTION, SOLUTION INTRAVENOUS
Status: DISCONTINUED | OUTPATIENT
Start: 2017-09-25 | End: 2017-09-26 | Stop reason: HOSPADM

## 2017-09-25 RX ORDER — LORAZEPAM 1 MG/1
1 TABLET ORAL EVERY 6 HOURS PRN
Status: DISCONTINUED | OUTPATIENT
Start: 2017-09-25 | End: 2017-09-25

## 2017-09-25 RX ORDER — MEPERIDINE HYDROCHLORIDE 25 MG/ML
25 INJECTION INTRAMUSCULAR; INTRAVENOUS; SUBCUTANEOUS EVERY 30 MIN PRN
Status: DISCONTINUED | OUTPATIENT
Start: 2017-09-25 | End: 2017-09-26 | Stop reason: HOSPADM

## 2017-09-25 RX ORDER — ATORVASTATIN CALCIUM 80 MG/1
80 TABLET, FILM COATED ORAL DAILY
Status: DISCONTINUED | OUTPATIENT
Start: 2017-09-26 | End: 2017-09-26 | Stop reason: HOSPADM

## 2017-09-25 RX ORDER — DEXAMETHASONE 4 MG/1
8 TABLET ORAL EVERY MORNING
Status: DISCONTINUED | OUTPATIENT
Start: 2017-09-26 | End: 2017-09-26 | Stop reason: HOSPADM

## 2017-09-25 RX ORDER — ONDANSETRON 8 MG/1
8 TABLET, FILM COATED ORAL DAILY
Status: CANCELLED | OUTPATIENT
Start: 2017-09-25

## 2017-09-25 RX ORDER — DIPHENHYDRAMINE HCL 50 MG
50 CAPSULE ORAL ONCE
Status: COMPLETED | OUTPATIENT
Start: 2017-09-25 | End: 2017-09-25

## 2017-09-25 RX ORDER — HEPARIN SODIUM (PORCINE) LOCK FLUSH IV SOLN 100 UNIT/ML 100 UNIT/ML
5 SOLUTION INTRAVENOUS ONCE
Status: COMPLETED | OUTPATIENT
Start: 2017-09-25 | End: 2017-09-25

## 2017-09-25 RX ORDER — METFORMIN HCL 500 MG
1000 TABLET, EXTENDED RELEASE 24 HR ORAL
Status: DISCONTINUED | OUTPATIENT
Start: 2017-09-25 | End: 2017-09-26 | Stop reason: HOSPADM

## 2017-09-25 RX ORDER — DEXAMETHASONE 4 MG/1
8 TABLET ORAL EVERY MORNING
Status: CANCELLED | OUTPATIENT
Start: 2017-09-26

## 2017-09-25 RX ORDER — ALBUTEROL SULFATE 0.83 MG/ML
2.5 SOLUTION RESPIRATORY (INHALATION)
Status: DISCONTINUED | OUTPATIENT
Start: 2017-09-25 | End: 2017-09-26 | Stop reason: HOSPADM

## 2017-09-25 RX ORDER — MEPERIDINE HYDROCHLORIDE 25 MG/ML
25 INJECTION INTRAMUSCULAR; INTRAVENOUS; SUBCUTANEOUS EVERY 30 MIN PRN
Status: CANCELLED | OUTPATIENT
Start: 2017-09-25

## 2017-09-25 RX ORDER — PROCHLORPERAZINE MALEATE 5 MG
10 TABLET ORAL EVERY 6 HOURS PRN
Status: DISCONTINUED | OUTPATIENT
Start: 2017-09-25 | End: 2017-09-26 | Stop reason: HOSPADM

## 2017-09-25 RX ORDER — SODIUM CHLORIDE 9 MG/ML
1000 INJECTION, SOLUTION INTRAVENOUS CONTINUOUS PRN
Status: DISCONTINUED | OUTPATIENT
Start: 2017-09-25 | End: 2017-09-26 | Stop reason: HOSPADM

## 2017-09-25 RX ORDER — DIPHENHYDRAMINE HYDROCHLORIDE 50 MG/ML
INJECTION INTRAMUSCULAR; INTRAVENOUS
Status: DISPENSED
Start: 2017-09-25 | End: 2017-09-26

## 2017-09-25 RX ORDER — DIPHENHYDRAMINE HCL 25 MG
50 CAPSULE ORAL ONCE
Status: CANCELLED | OUTPATIENT
Start: 2017-09-25 | End: 2017-09-25

## 2017-09-25 RX ORDER — PROCHLORPERAZINE MALEATE 10 MG
10 TABLET ORAL EVERY 6 HOURS PRN
Status: CANCELLED | OUTPATIENT
Start: 2017-09-25

## 2017-09-25 RX ORDER — PROCHLORPERAZINE MALEATE 5 MG
10 TABLET ORAL EVERY 6 HOURS PRN
Status: DISCONTINUED | OUTPATIENT
Start: 2017-09-25 | End: 2017-09-25

## 2017-09-25 RX ORDER — LORATADINE 10 MG/1
10 TABLET ORAL DAILY
Status: DISCONTINUED | OUTPATIENT
Start: 2017-09-26 | End: 2017-09-26 | Stop reason: HOSPADM

## 2017-09-25 RX ORDER — DEXAMETHASONE SODIUM PHOSPHATE 10 MG/ML
20 INJECTION, SOLUTION INTRAMUSCULAR; INTRAVENOUS ONCE
Status: COMPLETED | OUTPATIENT
Start: 2017-09-25 | End: 2017-09-25

## 2017-09-25 RX ORDER — DIPHENHYDRAMINE HCL 25 MG
50 CAPSULE ORAL ONCE
Status: CANCELLED | OUTPATIENT
Start: 2017-09-25

## 2017-09-25 RX ORDER — GABAPENTIN 100 MG/1
100 CAPSULE ORAL 3 TIMES DAILY
Status: DISCONTINUED | OUTPATIENT
Start: 2017-09-26 | End: 2017-09-26 | Stop reason: HOSPADM

## 2017-09-25 RX ORDER — POLYETHYLENE GLYCOL 3350 17 G/17G
17 POWDER, FOR SOLUTION ORAL DAILY PRN
Status: DISCONTINUED | OUTPATIENT
Start: 2017-09-25 | End: 2017-09-26 | Stop reason: HOSPADM

## 2017-09-25 RX ORDER — DEXAMETHASONE SODIUM PHOSPHATE 10 MG/ML
20 INJECTION, SOLUTION INTRAMUSCULAR; INTRAVENOUS ONCE
Status: CANCELLED | OUTPATIENT
Start: 2017-09-25

## 2017-09-25 RX ORDER — HEPARIN SODIUM,PORCINE 10 UNIT/ML
5-10 VIAL (ML) INTRAVENOUS
Status: DISCONTINUED | OUTPATIENT
Start: 2017-09-25 | End: 2017-09-26 | Stop reason: HOSPADM

## 2017-09-25 RX ORDER — ALBUTEROL SULFATE 90 UG/1
1-2 AEROSOL, METERED RESPIRATORY (INHALATION)
Status: DISCONTINUED | OUTPATIENT
Start: 2017-09-25 | End: 2017-09-26 | Stop reason: HOSPADM

## 2017-09-25 RX ORDER — NICOTINE POLACRILEX 4 MG
15-30 LOZENGE BUCCAL
Status: DISCONTINUED | OUTPATIENT
Start: 2017-09-25 | End: 2017-09-26 | Stop reason: HOSPADM

## 2017-09-25 RX ORDER — ALBUTEROL SULFATE 90 UG/1
1-2 AEROSOL, METERED RESPIRATORY (INHALATION)
Status: CANCELLED | OUTPATIENT
Start: 2017-09-25

## 2017-09-25 RX ORDER — HEPARIN SODIUM,PORCINE 10 UNIT/ML
5-10 VIAL (ML) INTRAVENOUS EVERY 24 HOURS
Status: DISCONTINUED | OUTPATIENT
Start: 2017-09-25 | End: 2017-09-26 | Stop reason: HOSPADM

## 2017-09-25 RX ORDER — LISDEXAMFETAMINE DIMESYLATE 50 MG/1
50 CAPSULE ORAL DAILY
Status: DISCONTINUED | OUTPATIENT
Start: 2017-09-26 | End: 2017-09-25

## 2017-09-25 RX ORDER — ESCITALOPRAM OXALATE 20 MG/1
20 TABLET ORAL DAILY
Status: DISCONTINUED | OUTPATIENT
Start: 2017-09-26 | End: 2017-09-26 | Stop reason: HOSPADM

## 2017-09-25 RX ORDER — LORAZEPAM 2 MG/ML
.5-1 INJECTION INTRAMUSCULAR EVERY 6 HOURS PRN
Status: CANCELLED | OUTPATIENT
Start: 2017-09-25

## 2017-09-25 RX ORDER — LORATADINE 10 MG/1
10 TABLET ORAL DAILY
Status: DISCONTINUED | OUTPATIENT
Start: 2017-09-26 | End: 2017-09-25

## 2017-09-25 RX ORDER — DIPHENHYDRAMINE HYDROCHLORIDE 50 MG/ML
50 INJECTION INTRAMUSCULAR; INTRAVENOUS
Status: CANCELLED | OUTPATIENT
Start: 2017-09-25

## 2017-09-25 RX ORDER — HEPARIN SODIUM (PORCINE) LOCK FLUSH IV SOLN 100 UNIT/ML 100 UNIT/ML
5 SOLUTION INTRAVENOUS
Status: DISCONTINUED | OUTPATIENT
Start: 2017-09-25 | End: 2017-09-26 | Stop reason: HOSPADM

## 2017-09-25 RX ORDER — METHYLPREDNISOLONE SODIUM SUCCINATE 125 MG/2ML
125 INJECTION, POWDER, LYOPHILIZED, FOR SOLUTION INTRAMUSCULAR; INTRAVENOUS
Status: CANCELLED | OUTPATIENT
Start: 2017-09-25

## 2017-09-25 RX ORDER — EPINEPHRINE 1 MG/ML
0.3 INJECTION INTRAMUSCULAR; INTRAVENOUS; SUBCUTANEOUS EVERY 5 MIN PRN
Status: DISCONTINUED | OUTPATIENT
Start: 2017-09-25 | End: 2017-09-26 | Stop reason: HOSPADM

## 2017-09-25 RX ORDER — PROCHLORPERAZINE MALEATE 10 MG
10 TABLET ORAL ONCE
Status: CANCELLED | OUTPATIENT
Start: 2017-09-25

## 2017-09-25 RX ORDER — METHYLPREDNISOLONE SODIUM SUCCINATE 125 MG/2ML
125 INJECTION, POWDER, LYOPHILIZED, FOR SOLUTION INTRAMUSCULAR; INTRAVENOUS
Status: DISCONTINUED | OUTPATIENT
Start: 2017-09-25 | End: 2017-09-26 | Stop reason: HOSPADM

## 2017-09-25 RX ORDER — MULTIVITAMIN,THERAPEUTIC
1 TABLET ORAL DAILY
Status: DISCONTINUED | OUTPATIENT
Start: 2017-09-25 | End: 2017-09-26 | Stop reason: HOSPADM

## 2017-09-25 RX ORDER — LORAZEPAM 0.5 MG/1
.5-1 TABLET ORAL EVERY 6 HOURS PRN
Status: DISCONTINUED | OUTPATIENT
Start: 2017-09-25 | End: 2017-09-26 | Stop reason: HOSPADM

## 2017-09-25 RX ORDER — ACETAMINOPHEN 325 MG/1
650 TABLET ORAL EVERY 6 HOURS PRN
Status: DISCONTINUED | OUTPATIENT
Start: 2017-09-25 | End: 2017-09-26 | Stop reason: HOSPADM

## 2017-09-25 RX ORDER — EPINEPHRINE 1 MG/ML
0.3 INJECTION INTRAMUSCULAR; INTRAVENOUS; SUBCUTANEOUS EVERY 5 MIN PRN
Status: CANCELLED | OUTPATIENT
Start: 2017-09-25

## 2017-09-25 RX ORDER — ONDANSETRON 8 MG/1
8 TABLET, FILM COATED ORAL DAILY
Status: DISCONTINUED | OUTPATIENT
Start: 2017-09-25 | End: 2017-09-26 | Stop reason: HOSPADM

## 2017-09-25 RX ORDER — ALBUTEROL SULFATE 0.83 MG/ML
2.5 SOLUTION RESPIRATORY (INHALATION)
Status: CANCELLED | OUTPATIENT
Start: 2017-09-25

## 2017-09-25 RX ORDER — DIPHENHYDRAMINE HYDROCHLORIDE 50 MG/ML
50 INJECTION INTRAMUSCULAR; INTRAVENOUS
Status: DISCONTINUED | OUTPATIENT
Start: 2017-09-25 | End: 2017-09-26 | Stop reason: HOSPADM

## 2017-09-25 RX ORDER — NALOXONE HYDROCHLORIDE 0.4 MG/ML
.1-.4 INJECTION, SOLUTION INTRAMUSCULAR; INTRAVENOUS; SUBCUTANEOUS
Status: DISCONTINUED | OUTPATIENT
Start: 2017-09-25 | End: 2017-09-26 | Stop reason: HOSPADM

## 2017-09-25 RX ORDER — AMOXICILLIN 250 MG
2 CAPSULE ORAL
Status: DISCONTINUED | OUTPATIENT
Start: 2017-09-25 | End: 2017-09-26 | Stop reason: HOSPADM

## 2017-09-25 RX ORDER — LORAZEPAM 2 MG/ML
.5-1 INJECTION INTRAMUSCULAR EVERY 6 HOURS PRN
Status: DISCONTINUED | OUTPATIENT
Start: 2017-09-25 | End: 2017-09-26 | Stop reason: HOSPADM

## 2017-09-25 RX ORDER — TRAZODONE HYDROCHLORIDE 50 MG/1
50-100 TABLET, FILM COATED ORAL
Status: DISCONTINUED | OUTPATIENT
Start: 2017-09-25 | End: 2017-09-26 | Stop reason: HOSPADM

## 2017-09-25 RX ORDER — LORAZEPAM 0.5 MG/1
.5-1 TABLET ORAL EVERY 6 HOURS PRN
Status: CANCELLED | OUTPATIENT
Start: 2017-09-25

## 2017-09-25 RX ORDER — LISINOPRIL 10 MG/1
10 TABLET ORAL DAILY
Status: DISCONTINUED | OUTPATIENT
Start: 2017-09-26 | End: 2017-09-26 | Stop reason: HOSPADM

## 2017-09-25 RX ORDER — SODIUM CHLORIDE 9 MG/ML
INJECTION, SOLUTION INTRAVENOUS
Status: COMPLETED
Start: 2017-09-25 | End: 2017-09-25

## 2017-09-25 RX ORDER — SODIUM CHLORIDE 9 MG/ML
1000 INJECTION, SOLUTION INTRAVENOUS CONTINUOUS PRN
Status: CANCELLED | OUTPATIENT
Start: 2017-09-25

## 2017-09-25 RX ORDER — LIDOCAINE 40 MG/G
CREAM TOPICAL
Status: DISCONTINUED | OUTPATIENT
Start: 2017-09-25 | End: 2017-09-26 | Stop reason: HOSPADM

## 2017-09-25 RX ORDER — DIPHENHYDRAMINE HCL 50 MG
50 CAPSULE ORAL ONCE
Status: COMPLETED | OUTPATIENT
Start: 2017-09-26 | End: 2017-09-26

## 2017-09-25 RX ORDER — DIPHENHYDRAMINE HCL 50 MG
50 CAPSULE ORAL ONCE
Status: DISCONTINUED | OUTPATIENT
Start: 2017-09-25 | End: 2017-09-26 | Stop reason: HOSPADM

## 2017-09-25 RX ORDER — PROCHLORPERAZINE MALEATE 5 MG
10 TABLET ORAL ONCE
Status: COMPLETED | OUTPATIENT
Start: 2017-09-25 | End: 2017-09-25

## 2017-09-25 RX ADMIN — INSULIN ASPART 2 UNITS: 100 INJECTION, SOLUTION INTRAVENOUS; SUBCUTANEOUS at 17:48

## 2017-09-25 RX ADMIN — ONDANSETRON HYDROCHLORIDE 8 MG: 8 TABLET, FILM COATED ORAL at 14:21

## 2017-09-25 RX ADMIN — SODIUM CHLORIDE 500 ML: 9 INJECTION, SOLUTION INTRAVENOUS at 15:51

## 2017-09-25 RX ADMIN — RIVAROXABAN 20 MG: 20 TABLET, FILM COATED ORAL at 17:48

## 2017-09-25 RX ADMIN — RANITIDINE HYDROCHLORIDE 50 MG: 25 INJECTION INTRAMUSCULAR; INTRAVENOUS at 14:54

## 2017-09-25 RX ADMIN — SODIUM CHLORIDE, PRESERVATIVE FREE 5 ML: 5 INJECTION INTRAVENOUS at 09:26

## 2017-09-25 RX ADMIN — DIPHENHYDRAMINE HYDROCHLORIDE 50 MG: 50 CAPSULE ORAL at 21:04

## 2017-09-25 RX ADMIN — LORAZEPAM 1 MG: 0.5 TABLET ORAL at 14:47

## 2017-09-25 RX ADMIN — DIPHENHYDRAMINE HYDROCHLORIDE 50 MG: 50 CAPSULE ORAL at 14:21

## 2017-09-25 RX ADMIN — PACLITAXEL 305 MG: 6 INJECTION, SOLUTION INTRAVENOUS at 21:47

## 2017-09-25 RX ADMIN — CALCIUM CARBONATE-VITAMIN D TAB 500 MG-200 UNIT 1 TABLET: 500-200 TAB at 22:27

## 2017-09-25 RX ADMIN — DEXAMETHASONE SODIUM PHOSPHATE 20 MG: 10 INJECTION INTRAMUSCULAR; INTRAVENOUS at 21:04

## 2017-09-25 RX ADMIN — SODIUM CHLORIDE 150 MG: 9 INJECTION, SOLUTION INTRAVENOUS at 14:17

## 2017-09-25 RX ADMIN — PACLITAXEL 36 MG: 6 INJECTION, SOLUTION INTRAVENOUS at 19:16

## 2017-09-25 RX ADMIN — DEXAMETHASONE SODIUM PHOSPHATE 20 MG: 10 INJECTION INTRAMUSCULAR; INTRAVENOUS at 14:54

## 2017-09-25 RX ADMIN — METFORMIN HYDROCHLORIDE 1000 MG: 500 TABLET, EXTENDED RELEASE ORAL at 17:28

## 2017-09-25 RX ADMIN — VITAMIN D, TAB 1000IU (100/BT) 2000 UNITS: 25 TAB at 19:22

## 2017-09-25 RX ADMIN — Medication 0.36 MG: at 15:31

## 2017-09-25 RX ADMIN — Medication 3.6 MG: at 17:13

## 2017-09-25 RX ADMIN — PROCHLORPERAZINE MALEATE 10 MG: 5 TABLET, FILM COATED ORAL at 14:21

## 2017-09-25 ASSESSMENT — PAIN SCALES - GENERAL: PAINLEVEL: NO PAIN (0)

## 2017-09-25 NOTE — IP AVS SNAPSHOT
MRN:[id number]                      After Visit Summary   9/25/2017    Hafsa Corona    MRN: [id number]           Thank you!     Thank you for choosing [facility] for your care. Our goal is always to provide you with excellent care. Hearing back from our patients is one way we can continue to improve our services. Please take a few minutes to complete the written survey that you may receive in the mail after you visit with us. Thank you!        Patient Information     Date Of Birth          1955        Designated Caregiver       Most Recent Value    Caregiver    Will someone help with your care after discharge? no      About your hospital stay     You were admitted on:  September 25, 2017 You last received care in the:  [address] [facility]    You were discharged on:  September 26, 2017        Reason for your hospital stay       Chemotherapy - Carboplatin/Paclitaxel desensitization treatment.                  Who to Call     For medical emergencies, please call 911.  For non-urgent questions about your medical care, please call your primary care provider or clinic, [phone]          Attending Provider     Provider Specialty    Jarod Shaffer MD Obstetrics & Gynecology, Maternal & Fetal Medicine    Tyra Andino MD OB/Gyn       Primary Care Provider Office Phone # Fax #    Morelia Ayon [phone]       When to contact your care team       Call [phone] (or [phone] on nights or weekends) if you have:    Temperature greater than 100.4  Persistent nausea and vomiting  Severe uncontrolled pain  Redness, tenderness, or signs of infection (pain, swelling, redness, odor or green/yellow discharge around the site)  Difficulty breathing, headache or visual disturbances  Hives  Persistent dizziness or light-headedness  Extreme fatigue  Any other questions or concerns you may have after discharge    In an emergency, call 911 or go to an Emergency Department at a  nearby hospital                  After Care Instructions     Activity       Your activity upon discharge: activity as tolerated            Diet       Follow this diet upon discharge: regular                  Follow-up Appointments     Adult [facility] Follow-up and recommended labs and tests       - Next follow-up appointment on 10/16/17 in clinic and subsequent inpatient chemotherapy     Appointments on [place] and/or [facility] (with [facility] or [facility] provider or service). Call [phone] if you haven't heard regarding these appointments within 7 days of discharge.                  Your next 10 appointments already scheduled     Sep 27, 2017 12:00 PM CDT   (Arrive by 11:45 AM)   INJECTION with  Oncology Injection Nurse   [facility] ([facility])    [address] [place] [address]  2nd Floor  [facility] [address]   [phone]            Oct 06, 2017 12:00 PM CDT   (Arrive by 11:45 AM)   NEW WITH ROOM with Mariam Sams GC,  2 114 CONSULT RM   [facility] ([facility])    [address] [place] [address]  2nd [facility] [address]   [phone]            Oct 06, 2017  1:15 PM CDT   [facility] with  [facility] D[facility]   [facility] ([facility])    [address]  2nd [facility] [address]   [phone]            Oct 16, 2017  7:45 AM CDT   [facility] Draw with  [facility] DRAW   [facility] ([facility])    [address] [address]   [phone]            Oct 16, 2017  8:20 AM CDT   (Arrive by 8:05 AM)   Return Active Treatment with MARLON Ogden CNP   [facility] ([facility])    [address]  2nd Floor  [facility] [address]   [phone]            Oct 26, 2017  2:00 PM CDT   (Arrive by 1:45 PM)   RETURN WITH ROOM with Lola  ROMA Vasquez, PhD ,  2 118 CONSULT [facility] ([facility])    [address]  2nd Floor  [facility] [id number] [phone]            Nov 14, 2017 10:00 AM CST   (Arrive by 9:45 AM)   CT CHEST W CONTRAST with UCCT1   [facility] ([facility])    [address]  1st Floor  [facility] [id number] [phone]           Please bring any scans or X-rays taken at other hospitals, if similar tests were done. Also bring a list of your medicines, including vitamins, minerals and over-the-counter drugs. It is safest to leave personal items at home.  Be sure to tell your doctor:   If you have any allergies.   If there s any chance you are pregnant.   If you are breastfeeding.   If you have any special needs.  You will have contrast for this exam. To prepare:   Do not eat or drink for 2 hours before your exam. If you need to take medicine, you may take it with small sips of water. (We may ask you to take liquid medicine as well.)   The day before your exam, drink extra fluids at least six 8-ounce glasses (unless your doctor tells you to restrict your fluids).  Patients over 70 or patients with diabetes or kidney problems:   If you haven t had a blood test (creatinine test) within the last 30 days, go to your clinic or Diagnostic Imaging Department for this test.  If you have diabetes:   If your kidney function is normal, continue taking your metformin (Avandamet, Glucophage, Glucovance, Metaglip) on the day of your exam.   If your kidney function is abnormal, wait 48 hours before restarting this medicine.  Please wear loose clothing, such as a sweat suit or jogging clothes. Avoid snaps, zippers and other metal. We may ask you to undress and put on a hospital gown.  If you have any questions, please call the Imaging Department where you will have your exam.              Pending Results     Date and Time Order Name  "Status Description    2017 0917 PROTEIN S ANTIGEN FREE In process     2017 0917 PROTEIN C CHROMOGENIC In process     2017 0917 LUPUS ANTICOAGULANT PANEL In process     2017 0917 FACTOR 2 AND 5 MUTATION ANALYSIS In process     2017 0917 BETA 2 GLYCOPROTEIN 1 ANTIBODY IGM In process     2017 0917 BETA 2 GLYCOPROTEIN 1 ANTIBODY IGG In process     2017 0917 ACTIVATED PROTEIN C RESISTANCE In process             Statement of Approval     Ordered          17 1034  I have reviewed and agree with all the recommendations and orders detailed in this document.  EFFECTIVE NOW     Approved and electronically signed by:  Gosia Lao MD             Admission Information     Date & Time Provider Department Dept. Phone    2017 Tyra Andino MD [address] [facility] [phone]      Your Vitals Were     Blood Pressure Pulse Temperature Respirations Height Weight    123/77 94 97.3  F (36.3  C) (Oral) 16 1.6 m (5' 3\") 82.2 kg (181 lb 3.2 oz)    Last Period Pulse Oximetry BMI (Body Mass Index)             2009 97% 32.1 kg/m2         [employer] Information     [employer] lets you send messages to your doctor, view your test results, renew your prescriptions, schedule appointments and more. To sign up, go to [email or web address] . Click on \"Log in\" on the left side of the screen, which will take you to the Welcome page. Then click on \"Sign up Now\" on the right side of the page.     You will be asked to enter the access code listed below, as well as some personal information. Please follow the directions to create your username and password.     Your access code is: [email or web address]  Expires: 10/10/2017 11:16 AM     Your access code will  in 90 days. If you need help or a new code, please call your [facility] or [phone].        Care EveryWhere ID     This is your Care EveryWhere ID. This could be used by other organizations to access your [place] medical " records  [id number]        Equal Access to Services     [facility] MANOJ : Hadii aad ku hadterecortez Leach, wagabrielleda ludoraadaha, qalata delphinexochiltsujatha otoole. So [facility] [phone].    ATENCIÓN: Si habla español, tiene a martínez disposición servicios gratuitos de asistencia lingüística. Elizabeth al [phone].    We comply with applicable federal civil rights laws and [place] laws. We do not discriminate on the basis of race, color, national origin, age, disability sex, sexual orientation or gender identity.               Review of your medicines      CONTINUE these medicines which may have CHANGED, or have new prescriptions. If we are uncertain of the size of tablets/capsules you have at home, strength may be listed as something that might have changed.        Dose / Directions    metFORMIN 500 MG 24 hr tablet   Commonly known as:  GLUCOPHAGE-XR   This may have changed:    - how much to take  - when to take this  - additional instructions   Used for:  Type 2 diabetes mellitus without complication, without long-term current use of insulin (H)        Take two tabs by mouth once daily with evening meal.   Quantity:  180 tablet   Refills:  PRN         CONTINUE these medicines which have NOT CHANGED        Dose / Directions    acetaminophen 325 MG tablet   Commonly known as:  TYLENOL   Used for:  Cellulitis and abscess of leg, Mass of pelvis        Dose:  650 mg   Take 2 tablets (650 mg) by mouth every 6 hours as needed for mild pain   Quantity:  100 tablet   Refills:  0       atorvastatin 80 MG tablet   Commonly known as:  LIPITOR   Used for:  Hyperlipidemia LDL goal <100        Dose:  80 mg   Take 1 tablet (80 mg) by mouth daily   Quantity:  90 tablet   Refills:  PRN       CALCIUM-MAGNESIUM-VITAMIN D PO        Dose:  2 tablet   Take 2 tablets by mouth daily   Refills:  0       dexamethasone 4 MG tablet   Commonly known as:  DECADRON   Used for:  Ovarian cancer, left (H), Encounter for  long-term (current) use of medications        Dose:  8 mg   Take 2 tablets (8 mg) by mouth every morning   Quantity:  2 tablet   Refills:  0       EPINEPHrine 0.3 MG/0.3ML injection 2-pack   Commonly known as:  EPIPEN/ADRENACLICK/or ANY BX GENERIC EQUIV   Used for:  Bee allergy status        Dose:  0.3 mg   Inject 0.3 mLs (0.3 mg) into the muscle once as needed for anaphylaxis   Quantity:  1 each   Refills:  PRN       escitalopram 20 MG tablet   Commonly known as:  LEXAPRO   Used for:  Major depressive disorder, recurrent episode, mild (H)        Dose:  20 mg   Take 1 tablet (20 mg) by mouth daily   Quantity:  30 tablet   Refills:  PRN       fluticasone 50 MCG/ACT spray   Commonly known as:  FLONASE   Used for:  Chronic maxillary sinusitis        Dose:  2 spray   Spray 2 sprays into both nostrils daily   Quantity:  48 g   Refills:  1       gabapentin 100 MG capsule   Commonly known as:  NEURONTIN   Used for:  Pain in joint, multiple sites        Dose:  100 mg   Take 1 capsule (100 mg) by mouth 3 times daily Start the day after chemo for one week   Quantity:  21 capsule   Refills:  1       lisinopril 10 MG tablet   Commonly known as:  PRINIVIL/ZESTRIL   Used for:  Essential hypertension        Dose:  10 mg   Take 1 tablet (10 mg) by mouth daily   Quantity:  90 tablet   Refills:  PRN       loratadine 10 MG tablet   Commonly known as:  CLARITIN   Used for:  Pain in joint, multiple sites        Dose:  10 mg   Take 1 tablet (10 mg) by mouth daily   Quantity:  30 tablet   Refills:  1       LORazepam 1 MG tablet   Commonly known as:  ATIVAN   Used for:  Ovarian cancer, left (H), Encounter for long-term (current) use of medications        Dose:  1 mg   Take 1 tablet (1 mg) by mouth every 6 hours as needed (nausea/vomiting, anxiety or sleep)   Quantity:  30 tablet   Refills:  1       MULTIVITAMIN ADULT PO        Dose:  1 tablet   Take 1 tablet by mouth daily   Refills:  0       order for DME   Used for:  Essential  "hypertension        Equipment being ordered: blood pressure monitor   Quantity:  1 Units   Refills:  0       * order for DME   Used for:  Long-term (current) use of anticoagulants, Acute deep vein thrombosis (DVT) of left lower extremity, unspecified vein (H)        Compression stockings 20-30 mm Hg. To be wear when directed by Hematology team and while awake for the first two years post clot.   Quantity:  1 each   Refills:  0       * order for DME   Used for:  Abscess of leg        Plain packing strip 1/4\"   Quantity:  1 Bottle   Refills:  PRN       * order for DME   Used for:  Abscess of leg        Sterile zoey tipped applicators   Quantity:  1 Box   Refills:  PRN       * order for DME   Used for:  Wound of left leg        USE 1/4 INCH  WIDTH PLAIN NU GAUZE PACKING TO DO SELF WOUND CARE OF LEFT LOWER LEG ONCE DAILY. DIMENSIONS OF WOUND PRESENTLY ARE 2 X 2 INCH AND APPROXIMATELY 1/2 INCH DEEP.  USES 6 INCHES OF PACKING CURRENTLY FOR DRESSING CHANGE.  FAX TO [employer] [phone]   Quantity:  3 Bottle   Refills:  2       * order for DME   Used for:  Leg wound, left        STERILE ADHESIVE PAD BANDAGE AT LEAST 4X4 INCH IN SIZE NEEDED FOR DAILY WOUND CARE TO LEFT LOWER LEG. FAX TO [employer] [phone]   Quantity:  30 pad   Refills:  1       polyethylene glycol Packet   Commonly known as:  MIRALAX/GLYCOLAX   Used for:  Mass of pelvis        Dose:  17 g   Take 17 g by mouth daily   Quantity:  7 packet   Refills:  0       PRO-BIOTIC BLEND PO        Dose:  1 capsule   Take 1 capsule by mouth daily Enzymatic Therapy-probiotic pearls   Refills:  0       prochlorperazine 10 MG tablet   Commonly known as:  COMPAZINE   Used for:  Ovarian cancer, left (H), Encounter for long-term (current) use of medications        Dose:  10 mg   Take 1 tablet (10 mg) by mouth every 6 hours as needed (nausea/vomiting)   Quantity:  30 tablet   Refills:  2       rivaroxaban ANTICOAGULANT 20 MG Tabs tablet   Commonly known as:  " XARELTO   Used for:  Long-term (current) use of anticoagulants, Acute deep vein thrombosis (DVT) of left lower extremity, unspecified vein (H)        Dose:  20 mg   Take 1 tablet (20 mg) by mouth daily (with dinner)   Quantity:  30 tablet   Refills:  11       senna-docusate 8.6-50 MG per tablet   Commonly known as:  SENOKOT-S;PERICOLACE   Used for:  Mass of pelvis        Dose:  2 tablet   Take 2 tablets by mouth 2 times daily Start with 1 tablet PO BID, If no bowel movement in 24 hours, increase to 2 tablets PO BID.  Hold for loose stools.   Quantity:  100 tablet   Refills:  0       traZODone 50 MG tablet   Commonly known as:  DESYREL   Used for:  Insomnia, unspecified type        TAKE 1 TO 2 TABLETS(50  MG) BY MOUTH EVERY NIGHT AS NEEDED FOR SLEEP   Quantity:  180 tablet   Refills:  1       vitamin B complex with vitamin C Tabs tablet        Dose:  1 tablet   Take 1 tablet by mouth daily   Refills:  0       vitamin D 1000 UNITS capsule        Dose:  2000 Units   Take 2,000 Units by mouth daily   Refills:  0       VYVANSE PO        Dose:  50 mg   Take 50 mg by mouth daily   Refills:  0       * Notice:  This list has 5 medication(s) that are the same as other medications prescribed for you. Read the directions carefully, and ask your doctor or other care provider to review them with you.             Protect others around you: Learn how to safely use, store and throw away your medicines at www.disposemymeds.org.             Medication List: This is a list of all your medications and when to take them. Check marks below indicate your daily home schedule. Keep this list as a reference.      Medications           Morning Afternoon Evening Bedtime As Needed    acetaminophen 325 MG tablet   Commonly known as:  TYLENOL   Take 2 tablets (650 mg) by mouth every 6 hours as needed for mild pain                                atorvastatin 80 MG tablet   Commonly known as:  LIPITOR   Take 1 tablet (80 mg) by mouth daily    Last time this was given:  80 mg on 9/26/2017 12:15 AM                                CALCIUM-MAGNESIUM-VITAMIN D PO   Take 2 tablets by mouth daily                                dexamethasone 4 MG tablet   Commonly known as:  DECADRON   Take 2 tablets (8 mg) by mouth every morning   Last time this was given:  8 mg on 9/26/2017  8:47 AM                                EPINEPHrine 0.3 MG/0.3ML injection 2-pack   Commonly known as:  EPIPEN/ADRENACLICK/or ANY BX GENERIC EQUIV   Inject 0.3 mLs (0.3 mg) into the muscle once as needed for anaphylaxis                                escitalopram 20 MG tablet   Commonly known as:  LEXAPRO   Take 1 tablet (20 mg) by mouth daily   Last time this was given:  20 mg on 9/26/2017  8:47 AM                                fluticasone 50 MCG/ACT spray   Commonly known as:  FLONASE   Spray 2 sprays into both nostrils daily                                gabapentin 100 MG capsule   Commonly known as:  NEURONTIN   Take 1 capsule (100 mg) by mouth 3 times daily Start the day after chemo for one week   Last time this was given:  100 mg on 9/26/2017  8:47 AM                                lisinopril 10 MG tablet   Commonly known as:  PRINIVIL/ZESTRIL   Take 1 tablet (10 mg) by mouth daily   Last time this was given:  10 mg on 9/26/2017 12:23 AM                                loratadine 10 MG tablet   Commonly known as:  CLARITIN   Take 1 tablet (10 mg) by mouth daily   Last time this was given:  10 mg on 9/26/2017  9:11 AM                                LORazepam 1 MG tablet   Commonly known as:  ATIVAN   Take 1 tablet (1 mg) by mouth every 6 hours as needed (nausea/vomiting, anxiety or sleep)   Last time this was given:  1 mg on 9/26/2017 12:17 AM                                metFORMIN 500 MG 24 hr tablet   Commonly known as:  GLUCOPHAGE-XR   Take two tabs by mouth once daily with evening meal.   Last time this was given:  1,000 mg on 9/25/2017  5:28 PM                                 "MULTIVITAMIN ADULT PO   Take 1 tablet by mouth daily                                order for DME   Equipment being ordered: blood pressure monitor                                * order for DME   Compression stockings 20-30 mm Hg. To be wear when directed by Hematology team and while awake for the first two years post clot.                                * order for DME   Plain packing strip 1/4\"                                * order for DME   Sterile zoey tipped applicators                                * order for DME   USE 1/4 INCH  WIDTH PLAIN NU GAUZE PACKING TO DO SELF WOUND CARE OF LEFT LOWER LEG ONCE DAILY. DIMENSIONS OF WOUND PRESENTLY ARE 2 X 2 INCH AND APPROXIMATELY 1/2 INCH DEEP.  USES 6 INCHES OF PACKING CURRENTLY FOR DRESSING CHANGE.  FAX TO [employer] [phone]                                * order for DME   STERILE ADHESIVE PAD BANDAGE AT LEAST 4X4 INCH IN SIZE NEEDED FOR DAILY WOUND CARE TO LEFT LOWER LEG. FAX TO [employer] [phone]                                polyethylene glycol Packet   Commonly known as:  MIRALAX/GLYCOLAX   Take 17 g by mouth daily                                PRO-BIOTIC BLEND PO   Take 1 capsule by mouth daily Enzymatic Therapy-probiotic pearls                                prochlorperazine 10 MG tablet   Commonly known as:  COMPAZINE   Take 1 tablet (10 mg) by mouth every 6 hours as needed (nausea/vomiting)   Last time this was given:  10 mg on 9/25/2017  2:21 PM                                rivaroxaban ANTICOAGULANT 20 MG Tabs tablet   Commonly known as:  XARELTO   Take 1 tablet (20 mg) by mouth daily (with dinner)   Last time this was given:  20 mg on 9/25/2017  5:48 PM                                senna-docusate 8.6-50 MG per tablet   Commonly known as:  SENOKOT-S;PERICOLACE   Take 2 tablets by mouth 2 times daily Start with 1 tablet PO BID, If no bowel movement in 24 hours, increase to 2 tablets PO BID.  Hold for loose stools.              "                   traZODone 50 MG tablet   Commonly known as:  DESYREL   TAKE 1 TO 2 TABLETS(50  MG) BY MOUTH EVERY NIGHT AS NEEDED FOR SLEEP   Last time this was given:  50 mg on 9/26/2017 12:18 AM                                vitamin B complex with vitamin C Tabs tablet   Take 1 tablet by mouth daily                                vitamin D 1000 UNITS capsule   Take 2,000 Units by mouth daily                                VYVANSE PO   Take 50 mg by mouth daily                                * Notice:  This list has 5 medication(s) that are the same as other medications prescribed for you. Read the directions carefully, and ask your doctor or other care provider to review them with you.

## 2017-09-25 NOTE — H&P
[facility] History and Physical    Hafsa Corona MRN# [id number]   Age: 62 year old YOB: 1955     Date of Admission:  9/25/2017    Primary care provider: Morelia Ayon             Chief Complaint:     Cycle #2 Carboplatin/Taxol Desensitization.         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 She presents today prior to cycle #2 of carbo/taxol desensitization. She took premed steroids at 0100 and 0700 am. She was seen last week in clinic for complaints of arthralgias, neuropathic pain and constipation. She is feeling well today and ready for chemo. Since then she has been feeling well without any further episodes of pain. Continues to take Smooth Move tea for constipation and has been taking Claritin daily for arthralgias. She has started L-glutamine and vitamin B6. Continues with intermittent numbness in her fingertips with her R thumb being the most symptomatic- this pre-dates chemotherapy but has worsened. No shortness of breath. She continues with difficulty sleeping- takes trazodone per her PCP. Eating and drinking well.          Cancer Treatment History:     The patient has had a recent episode of lower abdominal pain in early July.  She was subsequently sent to the emergency room and was found to have a large 9 cm complex ovarian mass. She was admitted to the hospital for pain control.  7/3/17:  1187  07/07/17: Exploratory laparotomy, total abdominal hysterectomy, left salpingo-oophorectomy, cancer staging including infracolic omentectomy, bilateral pelvic & para-aortic lymphadenectomy, peritoneal biopsies, evacuation of pelvic fluid by Dr. Cole.    FINAL DIAGNOSIS:   A. LEFT OVARY AND FALLOPIAN TUBE, LEFT SALPINGO-OOPHORECTOMY:   - Ovarian clear cell carcinoma   - Background of endometriosis   - Fallopian tube with no significant histologic abnormality.   B. UTERUS, HYSTERECTOMY:   -   Inactive endometrium   -  Myometrium with adenomyosis and leiomyoma.   -  Cervix with squamous metaplasia.   C. PERITONEUM, RIGHT PELVIS, BIOPSY:   - Fibroadipose tissue, negative for malignancy.   D. LYMPH NODES, RIGHT PELVIC, DISSECTION:   - Thirteen benign lymph nodes (0/13).   E. LYMPH NODES, LEFT PELVIC, DISSECTION:   - Seven benign lymph nodes (0/7).   F. PERITONEUM, LEFT PELVIS, BIOPSY:   - Fibroadipose tissue, negative for malignancy.   G. PERITONEUM, BLADDER, BIOPSY:   - Fibroadipose tissue  with acute and chronic inflammation, negative for malignancy.   H. PERITONEUM, POSTERIOR CUL-DE-SAC, BIOPSY:   - Fibroadipose tissue, negative for malignancy.   I. PERITONEUM, LEFT PARACOLIC GUTTER, BIOPSY:   - Fibroadipose tissue, negative for malignancy.   J. LYMPH NODES, LEFT PARA-AORTIC, DISSECTION:   - Five benign lymph nodes (0/5).   K. LYMPH NODES, RIGHT PARA-AORTIC, DISSECTION:   - Two benign lymph nodes (0/2).   L. PERITONEUM, RIGHT PARACOLIC GUTTER, BIOPSY:   - Fibroadipose tissue, negative for malignancy.   M. OMENTUM, OMENTECTOMY:   - Adipose tissue with reactive changes, negative for malignancy.   COMMENT:   The final diagnosis confirms the interpretation provided intraoperatively.   Report Name: Ovary or Fallopian Tube   Status: Submitted   Part(s) Involved:   A: Ovary and fallopian tube, left   Synoptic Report:   CLINICAL   Clinical History:   - Pelvic mass   SPECIMEN   Procedure:   - Left salpingo-oophorectomy   - Hysterectomy   - Omentectomy   - Peritoneal  biopsies   Lymph Node Sampling:   - Performed   Location:   - Pelvic lymph nodes   - Para-aortic lymph nodes   Specimen Integrity:   - Left ovary   Specimen Integrity of Left Ovary:   - Capsule intact   TUMOR   Primary Tumor Site(s):   - Left ovary   Left Ovary   Tumor Size of Left Ovary: 19 cm   Histologic Type:   - Clear cell carcinoma   Tumor Extent   Ovarian Surface Involvement:   - Absent   Specimen(s)   Extent of Left Ovary:   - Involved    Extent of Left Fallopian Tube:   - Not involved   Extent of Omentum:   - Not involved   Extent of Uterus:   - Not involved   Extent of Peritoneum:   - Not involved   Peritoneal Ascitic Fluid:   - Not performed / unknown   Pleural Fluid:   - Not performed / unknown   LYMPH NODES     Number of Pelvic Lymph Nodes Examined: 20     Number of Pelvic Lymph Nodes Involved: None identified     Number of Para-aortic Lymph Nodes Examined: 7     Number of Para-Aortic Lymph Nodes Involved: None identified   STAGE (PTNM, AJCC 7TH ED.)   Primary Tumor (pT):   - Ovary   Ovarian Primary Tumor (pT):   - pT1a: Tumor limited to 1 ovary; capsule intact, no tumor on ovarian surface. No malignant cells in ascites or peritoneal washings#   Regional Lymph Nodes (pN):   - pN0: No regional lymph node metastasis       07/31/17: Dr Shaffer follow up: Discussed with the patient that given the high risk histology, as well as ruptured mass before surgery, she would be qualified at higher risk of recurrence despite early stage ovarian cancer.  Recommended adjuvant chemotherapy. Plan for carboplatin of AUC of 6 and paclitaxel of 175, m2 for 3 cycles. Referral for genetic counselor and will see her back after those 3 cycles  08/03/17: Call from patient stating she is going for a second opinion and would like chemotherapy rescheduled to week of 8/14/17.    7/31/17 Plan for Carbo Taxol 3 cycles   8/16/17 Taxol hypersensitivity reaction CA-125 73  8/25/17 Taxol hypersensitivity reaction   08/30/17: Cycle #1 Carbo/Taxol desensitization as an inpatient. Tolerated well  09/25/17: Cycle #2 Carbo/Taxol desensitization as an inpatient.  pending         Past Medical History:     Past Medical History:   Diagnosis Date     Anxiety state, unspecified     [id number]     Attention deficit disorder without mention of hyperactivity      Chronic rhinitis      Depressive disorder, not elsewhere classified      Panic disorder without agoraphobia      Pure  hypercholesterolemia     Lipitor     Tachycardia, unspecified     9/1999-2/2004     Type II or unspecified type diabetes mellitus without mention of complication, not stated as uncontrolled     diagnosed 2004            Past Surgical History:      Past Surgical History:   Procedure Laterality Date     HYSTERECTOMY TOTAL ABDOMINAL, BILATERAL SALPINGO-OOPHORECTOMY, NODE DISSECTION, COMBINED Left 7/7/2017    Procedure: COMBINED HYSTERECTOMY TOTAL ABDOMINAL, SALPINGO-OOPHORECTOMY, NODE DISSECTION;  Exploratory Laparotomy, Left Salpingo-Oophorectomy, Cancer Staging, Total Hysterectomy, Omentectomy, Evacuation of abdominal fluid, Lymph Node Dissection  Anesthesia Block ;  Surgeon: Serena Cole MD;  Location: [facility] OR     HYSTERECTOMY, PAP NO LONGER INDICATED  07/07/2017    Laparotomy; for ovarian cancer staging     INSERT PORT VASCULAR ACCESS Right 8/21/2017    Procedure: INSERT PORT VASCULAR ACCESS;  Single Lumen Chest Power Port;  Surgeon: Stephen Mike PA-C;  Location: [facility] OR     LYMPHADENECTOMY RETROPERITONEAL Bilateral 07/07/2017    Laparotomy; pelvics & para-aortics; for ovarian cancer staging     OMENTECTOMY  07/07/2017    Laparotomy; for ovarian cancer staging     SALPINGO OOPHORECTOMY,R/L/KAYY Right 2008    Salpingo Oophorectomy, RT     SALPINGO OOPHORECTOMY,R/L/KAYY Left 07/07/2017    Laparotomy; for ovarian cancer staging     SURGICAL HISTORY OF -       facial surgery d/t fall in 1/2002     SURGICAL HISTORY OF -       1985 removal of breast cyst     SURGICAL HISTORY OF -   2008    endometrial ablation            Social History:     Social History   Substance Use Topics     Smoking status: Never Smoker     Smokeless tobacco: Never Used     Alcohol use Yes      Comment: rarely            Family History:     Family History   Problem Relation Age of Onset     C.A.D. Father      DIABETES Father      Hypertension Father      CEREBROVASCULAR DISEASE Father      Psychotic Disorder Father      Pancreatic  Cancer Father      C.A.D. Mother      Hypertension Mother      Breast Cancer Mother      Psychotic Disorder Mother      Colon Cancer Mother      CANCER Mother      Bone cancer     Prostate Problems Brother      cleared      Psychotic Disorder Sister      x2     Neurologic Disorder Sister      Psychotic Disorder Brother      x2     Depression Brother      major depressive disorder and OCD     Anxiety Disorder Brother      MENTAL ILLNESS Brother      Psychotic Disorder Maternal Grandmother      ?     Psychotic Disorder Maternal Grandfather      ?     Psychotic Disorder Paternal Grandmother      Schizophernia     Psychotic Disorder Paternal Grandfather      ?     Respiratory Paternal Grandfather       of emphysema and smoking     Psychotic Disorder Sister      Other - See Comments Sister      small kidney stone      Cancer - colorectal No family hx of             Immunizations:     Immunization History   Administered Date(s) Administered     Influenza (IIV3) 2006, 10/11/2007, 2008, 2009, 01/10/2013     Influenza Vaccine IM 3yrs+ 4 Valent IIV4 2013, 2016, 2016     Influenza Vaccine, 3 YRS +, IM (QUADRIVALENT W/PRESERVATIVES) 2014     Pneumococcal 23 valent 2013     TDAP Vaccine (Adacel) 2016            Allergies:     Allergies   Allergen Reactions     Wasps [Hornets] Anaphylaxis     Paclitaxel Difficulty breathing     Sulfa Drugs Hives            Medications:       No current facility-administered medications on file prior to encounter.   Current Outpatient Prescriptions on File Prior to Encounter:  dexamethasone (DECADRON) 4 MG tablet Take 2 tablets (8 mg) by mouth every morning   rivaroxaban ANTICOAGULANT (XARELTO) 20 MG TABS tablet Take 1 tablet (20 mg) by mouth daily (with dinner)   traZODone (DESYREL) 50 MG tablet TAKE 1 TO 2 TABLETS(50  MG) BY MOUTH EVERY NIGHT AS NEEDED FOR SLEEP   Multiple Vitamins-Minerals (MULTIVITAMIN ADULT PO) Take 1 tablet by  "mouth daily   vitamin B complex with vitamin C (VITAMIN  B COMPLEX) TABS tablet Take 1 tablet by mouth daily   CALCIUM-MAGNESIUM-VITAMIN D PO Take 2 tablets by mouth daily   Probiotic Product (PRO-BIOTIC BLEND PO) Take 1 capsule by mouth daily Enzymatic Therapy-probiotic pearls   atorvastatin (LIPITOR) 80 MG tablet Take 1 tablet (80 mg) by mouth daily   lisinopril (PRINIVIL/ZESTRIL) 10 MG tablet Take 1 tablet (10 mg) by mouth daily   metFORMIN (GLUCOPHAGE-XR) 500 MG 24 hr tablet Take two tabs by mouth once daily with evening meal. (Patient taking differently: 1,000 mg daily (with dinner) Take two tabs by mouth once daily with evening meal.)   escitalopram (LEXAPRO) 20 MG tablet Take 1 tablet (20 mg) by mouth daily   fluticasone (FLONASE) 50 MCG/ACT nasal spray Spray 2 sprays into both nostrils daily   Cholecalciferol (VITAMIN D) 1000 UNIT capsule Take 2,000 Units by mouth daily    order for DME STERILE ADHESIVE PAD BANDAGE AT LEAST 4X4 INCH IN SIZE NEEDED FOR DAILY WOUND CARE TO LEFT LOWER LEG.FAX TO [employer] [phone]   order for DME USE 1/4 INCH  WIDTH PLAIN NU GAUZE PACKING TO DO SELF WOUND CARE OF LEFT LOWER LEG ONCE DAILY.DIMENSIONS OF WOUND PRESENTLY ARE 2 X 2 INCH AND APPROXIMATELY 1/2 INCH DEEP.  USES 6 INCHES OF PACKING CURRENTLY FOR DRESSING CHANGE.FAX TO [employer] [phone]   LORazepam (ATIVAN) 1 MG tablet Take 1 tablet (1 mg) by mouth every 6 hours as needed (nausea/vomiting, anxiety or sleep)   prochlorperazine (COMPAZINE) 10 MG tablet Take 1 tablet (10 mg) by mouth every 6 hours as needed (nausea/vomiting) (Patient not taking: Reported on 9/25/2017)   order for DME Plain packing strip 1/4\"   order for DME Sterile zoey tipped applicators   acetaminophen (TYLENOL) 325 MG tablet Take 2 tablets (650 mg) by mouth every 6 hours as needed for mild pain (Patient not taking: Reported on 9/25/2017)   polyethylene glycol (MIRALAX/GLYCOLAX) Packet Take 17 g by mouth daily (Patient not taking: " Reported on 9/25/2017)   senna-docusate (SENOKOT-S;PERICOLACE) 8.6-50 MG per tablet Take 2 tablets by mouth 2 times daily Start with 1 tablet PO BID, If no bowel movement in 24 hours, increase to 2 tablets PO BID.  Hold for loose stools. (Patient not taking: Reported on 9/25/2017)   Lisdexamfetamine Dimesylate (VYVANSE PO) Take 50 mg by mouth daily   order for DME Compression stockings 20-30 mm Hg. To be wear when directed by Hematology team and while awake for the first two years post clot.   order for DME Equipment being ordered: blood pressure monitor   EPINEPHrine (EPIPEN) 0.3 MG/0.3ML injection Inject 0.3 mLs (0.3 mg) into the muscle once as needed for anaphylaxis            Review of Systems:     General: + sleep disturbance. Denies fatigue, changes in weight, weakness, appetite changes, night sweats, hot flashes, fever, chills  HEENT: + hair loss. Denies headaches, visual disturbances, spots or floaters, diplopia, masses. + hx cataracts that remain the same  Pulmonary: Denies cough, sputum, hemoptysis, shortness of breath, dyspnea on exertion, wheezing, or allergies  Cardiovascular: No chest pain. Denies fainting, palpitations, murmurs, activity intolerance, swelling in legs, or high blood pressure  Gastrointestinal: + constipation. Denies nausea, vomiting, diarrhea, abdominal pain, bloating, heartburn, melena, or hematochezia  Genitourinary: Denies dysuria, urinary urgency or frequency, hematuria, cloudy or malodorous urine, incontinence, repeat urinary tract infections, flank pain, pelvic pain, vaginal bleeding, vaginal discharge, or vaginal dryness  Hematologic: Denies swollen lymph nodes, masses, easy bruising, or easy bleeding + taking xarelto d/t history of DVT and follows with hematology. Edema to le L>R  Musculoskeletal: Denies falls, back pain, myalgias, arthralgias, stiffness, muscle weakness or muscle cramps  Neurologic: + numbness. Denies tingling, changes in memory, difficulty with walking,  "dizziness, seizures, or tremors  Psychiatric: + anxiety. Denies depression, nervousness, mood changes, or difficulty concentrating         Physical Exam:     Vitals:    09/25/17 1122   BP: (!) 140/96   BP Location: Left arm   Pulse: 104   Resp: 18   Temp: 97  F (36.1  C)   TempSrc: Oral   SpO2: 95%   Weight: 82.2 kg (181 lb 3.2 oz)   Height: 1.6 m (5' 3\")     CONSTITUTIONAL: Alert non-toxic appearing female in no acute distress  NECK: Neck supple without lymphadenopathy or masses  RESPIRATORY: Lungs clear to auscultation, respiratory effort unlabored  CV: Regular rate and rhythm, S1S2, no clicks, murmurs, rubs, or gallops; bilateral lower extremities with L>R +1 edema  GASTROINTESTINAL: Normoactive bowel sounds x4 quadrants, abdomen soft, non-distended, and non-tender  NEUROLOGIC: Grossly intact, normal gait  MUSCULOSKELETAL: Moves all extremities         Data:     Results for orders placed or performed in visit on 09/25/17 (from the past 24 hour(s))   CBC with platelets differential   Result Value Ref Range    WBC 5.0 4.0 - 11.0 10e9/L    RBC Count 3.80 3.8 - 5.2 10e12/L    Hemoglobin 10.4 (L) 11.7 - 15.7 g/dL    Hematocrit 32.6 (L) 35.0 - 47.0 %    MCV 86 78 - 100 fl    MCH 27.4 26.5 - 33.0 pg    MCHC 31.9 31.5 - 36.5 g/dL    RDW 15.9 (H) 10.0 - 15.0 %    Platelet Count 261 150 - 450 10e9/L    Diff Method Automated Method     % Neutrophils 79.6 %    % Lymphocytes 19.0 %    % Monocytes 1.0 %    % Eosinophils 0.0 %    % Basophils 0.2 %    % Immature Granulocytes 0.2 %    Nucleated RBCs 0 0 /100    Absolute Neutrophil 4.0 1.6 - 8.3 10e9/L    Absolute Lymphocytes 0.9 0.8 - 5.3 10e9/L    Absolute Monocytes 0.1 0.0 - 1.3 10e9/L    Absolute Eosinophils 0.0 0.0 - 0.7 10e9/L    Absolute Basophils 0.0 0.0 - 0.2 10e9/L    Abs Immature Granulocytes 0.0 0 - 0.4 10e9/L    Absolute Nucleated RBC 0.0    Mag   Result Value Ref Range    Magnesium 2.0 1.6 - 2.3 mg/dL   CMP - Comprehensive Metabolic Panel   Result Value Ref Range    " Sodium 140 133 - 144 mmol/L    Potassium 4.2 3.4 - 5.3 mmol/L    Chloride 106 94 - 109 mmol/L    Carbon Dioxide 25 20 - 32 mmol/L    Anion Gap 8 3 - 14 mmol/L    Glucose 181 (H) 70 - 99 mg/dL    Urea Nitrogen 21 7 - 30 mg/dL    Creatinine 0.71 0.52 - 1.04 mg/dL    GFR Estimate 83 >60 mL/min/1.7m2    GFR Estimate If Black >90 >60 mL/min/1.7m2    Calcium 9.4 8.5 - 10.1 mg/dL    Bilirubin Total 0.4 0.2 - 1.3 mg/dL    Albumin 3.5 3.4 - 5.0 g/dL    Protein Total 7.6 6.8 - 8.8 g/dL    Alkaline Phosphatase 99 40 - 150 U/L    ALT 19 0 - 50 U/L    AST 15 0 - 45 U/L             Assessment and Plan:     Assessment: 62 year old admitted for inpatient cycle #2 Taxol due to previous hypersensitivity reaction and carboplatin.     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 Receiving dexamethasone and Taxol today with plan for Carbo tomorrow. Patient will be set up for neulasta tomorrow.  FEN: Regular diet.   Pain: Tylenol prn.  Heme: Hgb 10.4, Plt 261--ok for chemo. 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 HLD home atorvastatin.  Resp: NI  GI: GERD. Continue home probiotic. H2B with chemo. PRN laxative and antiemetics  : NI    MSK: NI. Continue claritin and patient will start neurontin at home for arthralgias associated with chemo    Neuro/Psych: Anxiety. On home Lexapro. Trazadone for sleep.  Endocrine: DMII. Home Metformin ordered.  ID: AF, NI  PPx: SCDs, home Xarelto  Disposition: Observation for chemo and monitoring for hypersenstivitiy reaction    Concepcion Kohler CNP  9/25/2017 10:33 AM

## 2017-09-25 NOTE — DISCHARGE SUMMARY
Gynecologic Oncology Discharge Summary    Hafsa Corona  [id number]    Admit Date: 9/25/2017  Discharge Date: 9/26/2017  Admitting Provider: Dr. 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  -S/P Cycle #2 Carbo/Taxol Desensitization  -Hx Anxiety/Depression  -Hx Insomnia  -Hx HTN  -Hx DM II  -Hx HLD      Patient Active Problem List   Diagnosis     Attention deficit disorder     PANIC DISORDER      VASOMOTOR RHINITIS     Chronic Maxillary Sinusitis     Tachycardia     Type 2 diabetes mellitus without complication (H)     HYPERLIPIDEMIA LDL GOAL <100     Allergic rhinitis due to other allergen     BMI > 35     Essential hypertension     Lumbago     Major depressive disorder, recurrent episode, mild (H)     Long-term (current) use of anticoagulants [Z79.01]     Deep vein thrombosis (DVT) (H) [I82.409]     Pelvic mass in female     S/P laparotomy     Ovarian cancer, left (H)     Encounter for long-term (current) use of medications     Ovarian cancer (H)     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)     Pain in joint, multiple sites     Chemotherapy-induced neutropenia (H)       Procedures: Cycle #2 Carboplatin/Taxol desensitization     Prior to Admission Medications:  Prescriptions Prior to Admission   Medication Sig Dispense Refill Last Dose     loratadine (CLARITIN) 10 MG tablet Take 1 tablet (10 mg) by mouth daily 30 tablet 1 Past Week at Unknown time     dexamethasone (DECADRON) 4 MG tablet Take 2 tablets (8 mg) by mouth every morning 2 tablet 0 9/25/2017 at Unknown time     rivaroxaban ANTICOAGULANT (XARELTO) 20 MG TABS tablet Take 1 tablet (20 mg) by mouth daily (with dinner) 30 tablet 11 9/24/2017 at Unknown time     traZODone (DESYREL) 50 MG tablet TAKE 1 TO 2 TABLETS(50  MG) BY  MOUTH EVERY NIGHT AS NEEDED FOR SLEEP 180 tablet 1 9/24/2017 at Unknown time     Multiple Vitamins-Minerals (MULTIVITAMIN ADULT PO) Take 1 tablet by mouth daily   9/24/2017 at Unknown time     vitamin B complex with vitamin C (VITAMIN  B COMPLEX) TABS tablet Take 1 tablet by mouth daily   9/24/2017 at Unknown time     CALCIUM-MAGNESIUM-VITAMIN D PO Take 2 tablets by mouth daily   9/24/2017 at Unknown time     Probiotic Product (PRO-BIOTIC BLEND PO) Take 1 capsule by mouth daily Enzymatic Therapy-probiotic pearls   9/24/2017 at Unknown time     atorvastatin (LIPITOR) 80 MG tablet Take 1 tablet (80 mg) by mouth daily 90 tablet PRN 9/24/2017 at Unknown time     lisinopril (PRINIVIL/ZESTRIL) 10 MG tablet Take 1 tablet (10 mg) by mouth daily 90 tablet PRN 9/24/2017 at Unknown time     metFORMIN (GLUCOPHAGE-XR) 500 MG 24 hr tablet Take two tabs by mouth once daily with evening meal. (Patient taking differently: 1,000 mg daily (with dinner) Take two tabs by mouth once daily with evening meal.) 180 tablet PRN 9/24/2017 at Unknown time     escitalopram (LEXAPRO) 20 MG tablet Take 1 tablet (20 mg) by mouth daily 30 tablet PRN 9/25/2017 at Unknown time     fluticasone (FLONASE) 50 MCG/ACT nasal spray Spray 2 sprays into both nostrils daily 48 g 1 9/24/2017 at Unknown time     Cholecalciferol (VITAMIN D) 1000 UNIT capsule Take 2,000 Units by mouth daily    9/24/2017 at Unknown time     gabapentin (NEURONTIN) 100 MG capsule Take 1 capsule (100 mg) by mouth 3 times daily Start the day after chemo for one week 21 capsule 1 More than a month at Unknown time     order for DME STERILE ADHESIVE PAD BANDAGE AT LEAST 4X4 INCH IN SIZE NEEDED FOR DAILY WOUND CARE TO LEFT LOWER LEG.  [phone] pad 1 Taking     order for DME USE 1/4 INCH  WIDTH PLAIN NU GAUZE PACKING TO DO SELF WOUND CARE OF LEFT LOWER LEG ONCE DAILY.  DIMENSIONS OF WOUND PRESENTLY ARE 2 X 2 INCH AND APPROXIMATELY 1/2 INCH DEEP.  USES 6 INCHES  "OF PACKING CURRENTLY FOR DRESSING CHANGE.    FAX TO [facility] [phone] 3 Bottle 2 Taking     LORazepam (ATIVAN) 1 MG tablet Take 1 tablet (1 mg) by mouth every 6 hours as needed (nausea/vomiting, anxiety or sleep) 30 tablet 1 More than a month at Unknown time     prochlorperazine (COMPAZINE) 10 MG tablet Take 1 tablet (10 mg) by mouth every 6 hours as needed (nausea/vomiting) (Patient not taking: Reported on 9/25/2017) 30 tablet 2 More than a month at Unknown time     order for DME Plain packing strip 1/4\" 1 Bottle PRN Taking     order for DME Sterile zoey tipped applicators 1 Box PRN Taking     acetaminophen (TYLENOL) 325 MG tablet Take 2 tablets (650 mg) by mouth every 6 hours as needed for mild pain (Patient not taking: Reported on 9/25/2017) 100 tablet 0 More than a month at Unknown time     polyethylene glycol (MIRALAX/GLYCOLAX) Packet Take 17 g by mouth daily (Patient not taking: Reported on 9/25/2017) 7 packet 0 Unknown at Unknown time     senna-docusate (SENOKOT-S;PERICOLACE) 8.6-50 MG per tablet Take 2 tablets by mouth 2 times daily Start with 1 tablet PO BID, If no bowel movement in 24 hours, increase to 2 tablets PO BID.  Hold for loose stools. (Patient not taking: Reported on 9/25/2017) 100 tablet 0 More than a month at Unknown time     Lisdexamfetamine Dimesylate (VYVANSE PO) Take 50 mg by mouth daily   More than a month at Unknown time     order for DME Compression stockings 20-30 mm Hg. To be wear when directed by Hematology team and while awake for the first two years post clot. 1 each 0 Taking     order for DME Equipment being ordered: blood pressure monitor 1 Units 0 Taking     EPINEPHrine (EPIPEN) 0.3 MG/0.3ML injection Inject 0.3 mLs (0.3 mg) into the muscle once as needed for anaphylaxis 1 each PRN Taking       Discharge Medications:   Hafsa Corona   Home Medication Instructions AMIE:[id number]    Printed on:09/26/17 1035   Medication Information                      acetaminophen " "(TYLENOL) 325 MG tablet  Take 2 tablets (650 mg) by mouth every 6 hours as needed for mild pain             atorvastatin (LIPITOR) 80 MG tablet  Take 1 tablet (80 mg) by mouth daily             CALCIUM-MAGNESIUM-VITAMIN D PO  Take 2 tablets by mouth daily             Cholecalciferol (VITAMIN D) 1000 UNIT capsule  Take 2,000 Units by mouth daily              dexamethasone (DECADRON) 4 MG tablet  Take 2 tablets (8 mg) by mouth every morning             EPINEPHrine (EPIPEN) 0.3 MG/0.3ML injection  Inject 0.3 mLs (0.3 mg) into the muscle once as needed for anaphylaxis             escitalopram (LEXAPRO) 20 MG tablet  Take 1 tablet (20 mg) by mouth daily             fluticasone (FLONASE) 50 MCG/ACT nasal spray  Spray 2 sprays into both nostrils daily             gabapentin (NEURONTIN) 100 MG capsule  Take 1 capsule (100 mg) by mouth 3 times daily Start the day after chemo for one week             Lisdexamfetamine Dimesylate (VYVANSE PO)  Take 50 mg by mouth daily             lisinopril (PRINIVIL/ZESTRIL) 10 MG tablet  Take 1 tablet (10 mg) by mouth daily             loratadine (CLARITIN) 10 MG tablet  Take 1 tablet (10 mg) by mouth daily             LORazepam (ATIVAN) 1 MG tablet  Take 1 tablet (1 mg) by mouth every 6 hours as needed (nausea/vomiting, anxiety or sleep)             metFORMIN (GLUCOPHAGE-XR) 500 MG 24 hr tablet  Take two tabs by mouth once daily with evening meal.             Multiple Vitamins-Minerals (MULTIVITAMIN ADULT PO)  Take 1 tablet by mouth daily             order for DME  Equipment being ordered: blood pressure monitor             order for DME  Compression stockings 20-30 mm Hg. To be wear when directed by Hematology team and while awake for the first two years post clot.             order for DME  Plain packing strip 1/4\"             order for DME  Sterile zoey tipped applicators             order for DME  USE 1/4 INCH  WIDTH PLAIN NU GAUZE PACKING TO DO SELF WOUND CARE OF LEFT LOWER LEG ONCE " DAILY.  DIMENSIONS OF WOUND PRESENTLY ARE 2 X 2 INCH AND APPROXIMATELY 1/2 INCH DEEP.  USES 6 INCHES OF PACKING CURRENTLY FOR DRESSING CHANGE.    FAX TO [facility] [phone]             order for DME  STERILE ADHESIVE PAD BANDAGE AT LEAST 4X4 INCH IN SIZE NEEDED FOR DAILY WOUND CARE TO LEFT LOWER LEG.  FAX TO [facility] [phone]             polyethylene glycol (MIRALAX/GLYCOLAX) Packet  Take 17 g by mouth daily             Probiotic Product (PRO-BIOTIC BLEND PO)  Take 1 capsule by mouth daily Enzymatic Therapy-probiotic pearls             prochlorperazine (COMPAZINE) 10 MG tablet  Take 1 tablet (10 mg) by mouth every 6 hours as needed (nausea/vomiting)             rivaroxaban ANTICOAGULANT (XARELTO) 20 MG TABS tablet  Take 1 tablet (20 mg) by mouth daily (with dinner)             senna-docusate (SENOKOT-S;PERICOLACE) 8.6-50 MG per tablet  Take 2 tablets by mouth 2 times daily Start with 1 tablet PO BID, If no bowel movement in 24 hours, increase to 2 tablets PO BID.  Hold for loose stools.             traZODone (DESYREL) 50 MG tablet  TAKE 1 TO 2 TABLETS(50  MG) BY MOUTH EVERY NIGHT AS NEEDED FOR SLEEP             vitamin B complex with vitamin C (VITAMIN  B COMPLEX) TABS tablet  Take 1 tablet by mouth daily               Consultations:  None    Brief History of Illness:  7/3/17 Presented to the ED and was found to have 9 cm mass CA-125 1187  7/7/17 underwent HELENE, BSO, PPALND, with ruptured mass before surgery, pathology with clear cell carcinoma  7/31/17 Plan for Carbo Taxol 3 cycles   8/16/17 Taxol hypersensitivity reaction CA-125 73  8/25/17 Taxol hypersensitivity reaction   08/30/17: Cycle #1 Carbo/Taxol desensitization    Hospital Course:  Dz:   - Stage IC2 Clear cell carcinoma of the ovary. Cycle #2 carbo/taxol desensitization. She experienced no adverse reactions during her inpatient therapy.   FEN:   - She was tolerating a regular diet without nausea and vomiting and able to maintain  her hydration without IVF supplementation.  Pain:   - Her pain was initially controlled on home tylenol, claritin and neurontin. Her pain was well controlled on this and she was discharged home with these medications.  CV:   - She has a history of HTN and HLD. She was continued on home Xarelto.  Her vital signs were stable while in house and she had no acute CV issues.  PULM:   - She has no history of pulmonary issues. Her O2 sats were greater than 94% on RA.  She had no acute pulmonary issues while in house.  HEME:   - Her Hgb was 10.4. She had no other acute heme issues while in house. Hx of LE DVTs, on home Xarelto  GI:   - She was tolerating a regular diet without nausea and vomiting.  She will be discharged with a bowel regimen to prevent constipation.  She had no acute GI issues while in house.  :    -  The patient was voiding spontaneously without difficulty.  She had no acute  issues while in house.  ID:   - The patient was AF during her hospitalization.   ENDO:   - No acute issues. Hx DM II. Continued on home metformin. RFS ACHS with SSI PRN.  PSYCH/NEURO:   - No acute issues. Continued on home Lexapro and trazadone.   PPX:    - She was given SCDs, H2B and Xarelto during her hospital course.    Discharge Instructions and Follow up:  Ms. Hafsa Corona was discharged from the hospital with follow up for     Discharge Diet: Diabetic (1800 ADA)  Discharge Activity: Activity as tolerated  Discharge Follow up: 10/06 with genetic counselor             10/16 for next chemotherapy treatment    Discharge Disposition:  Discharged to home    Discharge Staff: Tyra Barrios MD   Resident Physician, PGY2  Obstetrics, Gynecology, and Women's Health    Provider Disclosure:   I agree with above History, Review of Systems, Physical exam and Plan. I have reviewed the content of the documentation and have edited it as needed. I have personally performed the services documented here and the documentation  accurately represents those services and the decisions I have made.     Electronically signed by:   Tyra Andino MD   Gynecologic Oncology   [facility]

## 2017-09-25 NOTE — NURSING NOTE
Chief Complaint   Patient presents with     Port Draw     labs drawn via port     /76 (BP Location: Right arm, Patient Position: Chair, Cuff Size: Adult Regular)  Pulse 101  Temp 97.9  F (36.6  C) (Oral)  Wt 82.6 kg (182 lb)  LMP 01/01/2009  SpO2 96%  BMI 32.24 kg/m2    Vitals taken. Port accessed by RN. Labs collected and sent. Line flushed with NS & Heparin. Pt tolerated well. Pt checked in for next appointment.    Brenna Silva RN

## 2017-09-25 NOTE — PLAN OF CARE
Problem: Patient Care Overview  Goal: Plan of Care/Patient Progress Review  Observation goals PRIOR TO DISCHARGE     Comments: -vital signs normal or at patient baseline   -tolerating oral intake to maintain hydration   -chemo complete   Nurse to notify provider when observation goals have been met and patient is ready for discharge.      Goals not met. Patient admitted for chemotherapy. Port accessed, has + blood return. Has a lot of anxiety, took ativan. Getting pre meds will start first bag of taxol shortly.  First bag of chemo started at appx 3:30, VSS, patient doing well.

## 2017-09-25 NOTE — LETTER
"2017       RE: Hafsa Corona  [address]  [place] [address]     Dear Colleague,    Thank you for referring your patient, Hafsa Corona, to the [facility]. Please see a copy of my visit note below.    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 She is undergoing treatment with carboplatin/paclitaxel.      HPI:  Hafsa comes to the clinic feeling well today. She is here for Taxol desensitization- took her steroids at 0100 and 0700. She was seen for chemotherapy concerns on 9/15/17- was having arthralgias, neuropathic pain, and constipation. Since then she has been feeling well without any further episodes of pain. Continues to take Smooth Move tea for constipation and has been taking Claritin daily for arthralgias. She has started L-glutamine and vitamin B6. Continues with intermittent numbness in her fingertips with her R thumb being the most symptomatic- this pre-dates chemotherapy but has worsened. She types frequently, does not wear braces for carpal tunnel. She notes \"twinges\" in her chest (several foci) intermittently with deep breathing that worsen after eating. She denies any signs of ACS or infection and thinks it may be related to heartburn- does not take anything for this. No shortness of breath. She continues with difficulty sleeping- takes trazodone per her PCP. She notes anxiety for which she occasionally takes Xanax and is wondering about refilling this. She drinks Smooth Move tea for constipation. Eating and drinking well. She took her steroid prep at 0100 and 0700. She feels ready for chemotherapy today.    Oncology History:  The patient has had a recent episode of lower abdominal pain in early July.  She was subsequently sent to the " emergency room and was found to have a large 9 cm complex ovarian mass. She was admitted to the hospital for pain control.  7/3/17:  1187  07/07/17: Exploratory laparotomy, total abdominal hysterectomy, left salpingo-oophorectomy, cancer staging including infracolic omentectomy, bilateral pelvic & para-aortic lymphadenectomy, peritoneal biopsies, evacuation of pelvic fluid by Dr. Cole.    FINAL DIAGNOSIS:   A. LEFT OVARY AND FALLOPIAN TUBE, LEFT SALPINGO-OOPHORECTOMY:   - Ovarian clear cell carcinoma   - Background of endometriosis   - Fallopian tube with no significant histologic abnormality.   B. UTERUS, HYSTERECTOMY:   -  Inactive endometrium   -  Myometrium with adenomyosis and leiomyoma.   -  Cervix with squamous metaplasia.   C. PERITONEUM, RIGHT PELVIS, BIOPSY:   - Fibroadipose tissue, negative for malignancy.   D. LYMPH NODES, RIGHT PELVIC, DISSECTION:   - Thirteen benign lymph nodes (0/13).   E. LYMPH NODES, LEFT PELVIC, DISSECTION:   - Seven benign lymph nodes (0/7).   F. PERITONEUM, LEFT PELVIS, BIOPSY:   - Fibroadipose tissue, negative for malignancy.   G. PERITONEUM, BLADDER, BIOPSY:   - Fibroadipose tissue  with acute and chronic inflammation, negative for malignancy.   H. PERITONEUM, POSTERIOR CUL-DE-SAC, BIOPSY:   - Fibroadipose tissue, negative for malignancy.   I. PERITONEUM, LEFT PARACOLIC GUTTER, BIOPSY:   - Fibroadipose tissue, negative for malignancy.   J. LYMPH NODES, LEFT PARA-AORTIC, DISSECTION:   - Five benign lymph nodes (0/5).   K. LYMPH NODES, RIGHT PARA-AORTIC, DISSECTION:   - Two benign lymph nodes (0/2).   L. PERITONEUM, RIGHT PARACOLIC GUTTER, BIOPSY:   - Fibroadipose tissue, negative for malignancy.   M. OMENTUM, OMENTECTOMY:   - Adipose tissue with reactive changes, negative for malignancy.   COMMENT:   The final diagnosis confirms the interpretation provided intraoperatively.   Report Name: Ovary or Fallopian Tube   Status: Submitted   Part(s) Involved:   A: Ovary and  fallopian tube, left   Synoptic Report:   CLINICAL   Clinical History:   - Pelvic mass   SPECIMEN   Procedure:   - Left salpingo-oophorectomy   - Hysterectomy   - Omentectomy   - Peritoneal  biopsies   Lymph Node Sampling:   - Performed   Location:   - Pelvic lymph nodes   - Para-aortic lymph nodes   Specimen Integrity:   - Left ovary   Specimen Integrity of Left Ovary:   - Capsule intact   TUMOR   Primary Tumor Site(s):   - Left ovary   Left Ovary   Tumor Size of Left Ovary: 19 cm   Histologic Type:   - Clear cell carcinoma   Tumor Extent   Ovarian Surface Involvement:   - Absent   Specimen(s)   Extent of Left Ovary:   - Involved   Extent of Left Fallopian Tube:   - Not involved   Extent of Omentum:   - Not involved   Extent of Uterus:   - Not involved   Extent of Peritoneum:   - Not involved   Peritoneal Ascitic Fluid:   - Not performed / unknown   Pleural Fluid:   - Not performed / unknown   LYMPH NODES     Number of Pelvic Lymph Nodes Examined: 20     Number of Pelvic Lymph Nodes Involved: None identified     Number of Para-aortic Lymph Nodes Examined: 7     Number of Para-Aortic Lymph Nodes Involved: None identified   STAGE (PTNM, AJCC 7TH ED.)   Primary Tumor (pT):   - Ovary   Ovarian Primary Tumor (pT):   - pT1a: Tumor limited to 1 ovary; capsule intact, no tumor on ovarian surface. No malignant cells in ascites or peritoneal washings#   Regional Lymph Nodes (pN):   - pN0: No regional lymph node metastasis      07/31/17: Dr Shaffer follow up: Discussed with the patient that given the high risk histology, as well as ruptured mass before surgery, she would be qualified at higher risk of recurrence despite early stage ovarian cancer.  Recommended adjuvant chemotherapy. Plan for carboplatin of AUC of 6 and paclitaxel of 175, m2 for 3 cycles. Referral for genetic counselor and will see her back after those 3 cycles  08/03/17: Call from patient stating she is going for a second opinion and would like  chemotherapy rescheduled to week of 8/14/17.     08/16/17: Cycle #1 Carbo/Taxol.  73. Significant paclitaxel reaction.  08/30/17: C1 carboplatin/inpatient paclitaxel desensitization.   09/25/17: C2 carboplatin/paclitaxel- inpatient paclitaxel desensitization.  pending.      Past Medical History:   Diagnosis Date     Anxiety state, unspecified     [id number]     Attention deficit disorder without mention of hyperactivity      Chronic rhinitis      Depressive disorder, not elsewhere classified      Panic disorder without agoraphobia      Pure hypercholesterolemia     Lipitor     Tachycardia, unspecified     9/1999-2/2004     Type II or unspecified type diabetes mellitus without mention of complication, not stated as uncontrolled     diagnosed 2004       Past Surgical History:   Procedure Laterality Date     HYSTERECTOMY TOTAL ABDOMINAL, BILATERAL SALPINGO-OOPHORECTOMY, NODE DISSECTION, COMBINED Left 7/7/2017    Procedure: COMBINED HYSTERECTOMY TOTAL ABDOMINAL, SALPINGO-OOPHORECTOMY, NODE DISSECTION;  Exploratory Laparotomy, Left Salpingo-Oophorectomy, Cancer Staging, Total Hysterectomy, Omentectomy, Evacuation of abdominal fluid, Lymph Node Dissection  Anesthesia Block ;  Surgeon: Serena Cole MD;  Location: [facility] OR     HYSTERECTOMY, PAP NO LONGER INDICATED  07/07/2017    Laparotomy; for ovarian cancer staging     INSERT PORT VASCULAR ACCESS Right 8/21/2017    Procedure: INSERT PORT VASCULAR ACCESS;  Single Lumen Chest Power Port;  Surgeon: Stephen Mike PA-C;  Location: [facility] OR     LYMPHADENECTOMY RETROPERITONEAL Bilateral 07/07/2017    Laparotomy; pelvics & para-aortics; for ovarian cancer staging     OMENTECTOMY  07/07/2017    Laparotomy; for ovarian cancer staging     SALPINGO OOPHORECTOMY,R/L/KAYY Right 2008    Salpingo Oophorectomy, RT     SALPINGO OOPHORECTOMY,R/L/KAYY Left 07/07/2017    Laparotomy; for ovarian cancer staging     SURGICAL HISTORY OF -       facial surgery d/t fall in  1/2002     SURGICAL HISTORY OF -       1985 removal of breast cyst     SURGICAL HISTORY OF -   2008    endometrial ablation       Current Outpatient Prescriptions   Medication     loratadine (CLARITIN) 10 MG tablet     gabapentin (NEURONTIN) 100 MG capsule     dexamethasone (DECADRON) 4 MG tablet     order for DME     order for DME     LORazepam (ATIVAN) 1 MG tablet     rivaroxaban ANTICOAGULANT (XARELTO) 20 MG TABS tablet     traZODone (DESYREL) 50 MG tablet     order for DME     order for DME     Multiple Vitamins-Minerals (MULTIVITAMIN ADULT PO)     vitamin B complex with vitamin C (VITAMIN  B COMPLEX) TABS tablet     Lisdexamfetamine Dimesylate (VYVANSE PO)     order for DME     CALCIUM-MAGNESIUM-VITAMIN D PO     Probiotic Product (PRO-BIOTIC BLEND PO)     atorvastatin (LIPITOR) 80 MG tablet     order for DME     lisinopril (PRINIVIL/ZESTRIL) 10 MG tablet     metFORMIN (GLUCOPHAGE-XR) 500 MG 24 hr tablet     escitalopram (LEXAPRO) 20 MG tablet     EPINEPHrine (EPIPEN) 0.3 MG/0.3ML injection     fluticasone (FLONASE) 50 MCG/ACT nasal spray     Cholecalciferol (VITAMIN D) 1000 UNIT capsule     prochlorperazine (COMPAZINE) 10 MG tablet     acetaminophen (TYLENOL) 325 MG tablet     polyethylene glycol (MIRALAX/GLYCOLAX) Packet     senna-docusate (SENOKOT-S;PERICOLACE) 8.6-50 MG per tablet     No current facility-administered medications for this visit.           Allergies   Allergen Reactions     Wasps [Hornets] Anaphylaxis     Paclitaxel Difficulty breathing     Sulfa Drugs Hives       Family History   Problem Relation Age of Onset     C.A.D. Father      DIABETES Father      Hypertension Father      CEREBROVASCULAR DISEASE Father      Psychotic Disorder Father      Pancreatic Cancer Father      C.A.D. Mother      Hypertension Mother      Breast Cancer Mother      Psychotic Disorder Mother      Colon Cancer Mother      CANCER Mother      Bone cancer     Prostate Problems Brother      cleared      Psychotic Disorder  "Sister      x2     Neurologic Disorder Sister      Psychotic Disorder Brother      x2     Depression Brother      major depressive disorder and OCD     Anxiety Disorder Brother      MENTAL ILLNESS Brother      Psychotic Disorder Maternal Grandmother      ?     Psychotic Disorder Maternal Grandfather      ?     Psychotic Disorder Paternal Grandmother      Schizophernia     Psychotic Disorder Paternal Grandfather      ?     Respiratory Paternal Grandfather       of emphysema and smoking     Psychotic Disorder Sister      Other - See Comments Sister      small kidney stone      Cancer - colorectal No family hx of        Social History     Social History     Marital status: Single     Spouse name: N/A     Number of children: N/A     Years of education: N/A     Occupational History     Not on file.     Social History Main Topics     Smoking status: Never Smoker     Smokeless tobacco: Never Used     Alcohol use Yes      Comment: rarely     Drug use: No     Sexual activity: Yes     Birth control/ protection: None     Other Topics Concern     Not on file     Social History Narrative           ROS  General: + sleep disturbance. Denies fatigue, changes in weight, weakness, appetite changes, night sweats, hot flashes, fever, chills  HEENT: + hair loss. Denies headaches, visual difficulty or disturbances, spots or floaters, diplopia, masses, head injury, tinnitus, hearing loss, epistaxis, congestion, problems with teeth or gums, dysphonia, or dysphagia  Pulmonary: Denies cough, sputum, hemoptysis, shortness of breath, dyspnea on exertion, wheezing, or allergies  Cardiovascular: + chest \"twinges.\" Denies fainting, palpitations, murmurs, activity intolerance, swelling in legs, or high blood pressure  Gastrointestinal: + constipation. Denies nausea, vomiting, diarrhea, abdominal pain, bloating, heartburn, melena, hematochezia, or jaundice  Genitourinary: Denies dysuria, urinary urgency or frequency, hematuria, cloudy or " "malodorous urine, incontinence, repeat urinary tract infections, flank pain, pelvic pain, vaginal bleeding, vaginal discharge, or vaginal dryness  Sexual Function: Denies pain with intercourse, changes in libido, arousal difficulty, or changes in orgasm  Integumentary: Denies rashes, sores, changing moles, or scarring  Hematologic: Denies swollen lymph nodes, masses, easy bruising, or easy bleeding  Musculoskeletal: Denies falls, back pain, myalgias, arthralgias, stiffness, muscle weakness or muscle cramps  Neurologic: + numbness. Denies tingling, changes in memory, difficulty with walking, dizziness, seizures, or tremors  Psychiatric: + anxiety. Denies depression, nervousness, mood changes, suicidal thoughts, or difficulty concentrating  Endocrine: Denies polydipsia, polyuria, temperature intolerance, or history of thyroid disease      Physical Exam:    /76 (BP Location: Right arm, Patient Position: Chair, Cuff Size: Adult Regular)  Pulse 101  Temp 97.9  F (36.6  C) (Oral)  Ht 1.6 m (5' 3\")  Wt 82.6 kg (182 lb)  LMP 01/01/2009  SpO2 96%  Breastfeeding? No  BMI 32.24 kg/m2    CONSTITUTIONAL: Alert non-toxic appearing female in no acute distress  HEAD: Normocephalic, atraumatic  EYES: PERRLA; no scleral icterus  ENT: Oropharynx pink without lesions  NECK: Neck supple without lymphadenopathy  RESPIRATORY: Lungs clear to auscultation, no increased work of breathing noted  CV: Tachycardic, regular rhythm, S1S2, no clicks, murmurs, rubs, or gallops; bilateral lower extremities with trace edema, dorsalis pedis pulses 2+ bilaterally  GASTROINTESTINAL: Normoactive bowel sounds x4 quadrants, abdomen soft, non-distended, and non-tender to palpation without masses or organomegaly  GENITOURINARY: Not indicated  LYMPHATIC: Cervical, supraclavicular, and inguinal nodes without lymphadenopathy  MUSCULOSKELETAL: Moves all extremities, no obvious muscle wasting  NEUROLOGIC: No gross deficits, normal gait  SKIN: " Appropriate color for race, warm and dry, no rashes or lesions to unclothed skin  PSYCHIATRIC: Pleasant and interactive, affect bright, speech pressured and tangential at times, makes appropriate eye contact, thought process linear      Labs:      9/25/2017  Inpatient (Planned)   Hemoglobin 11.7 - 15.7 g/dL 10.4 (A)   Hematocrit 35.0 - 47.0 % 32.6 (A)   Platelet Count 150 - 450 10e9/L 261   Absolute Neutrophil 1.6 - 8.3 10e9/L 4.0   Sodium 133 - 144 mmol/L 140   Potassium 3.4 - 5.3 mmol/L 4.2   Chloride 94 - 109 mmol/L 106   Carbon Dioxide 20 - 32 mmol/L 25   Urea Nitrogen 7 - 30 mg/dL 21   Creatinine 0.52 - 1.04 mg/dL 0.71   Calcium 8.5 - 10.1 mg/dL 9.4   Magnesium 1.6 - 2.3 mg/dL 2.0   Bilirubin Total 0.2 - 1.3 mg/dL 0.4   ALT 0 - 50 U/L 19   AST 0 - 45 U/L 15   Alkaline Phosphatase 40 - 150 U/L 99   Albumin 3.4 - 5.0 g/dL 3.5   Protein Total 6.8 - 8.8 g/dL 7.6   WBC 4.0 - 11.0 10e9/L 5.0    pending    Assessment/Plan:  1) Stage IC2 clear cell ovarian carcinoma: Proceed with cycle two of carboplatin/ inpatient paclitaxel desensitization. To receive total of 3 cycles followed by treatment planning with Dr. Shaffer. Report given to Amanda DIAZ on 7C and Concepcion Kohler NP of inpatient gynecologic oncology team. Reviewed signs and symptoms for when she should contact the clinic or seek additional care, including but not limited to fever, chills, inability to keep down food or fluids, nausea and vomiting not controlled with antiemetics, and diarrhea leading to dehydration. Patient to contact the clinic with any questions or concerns in the interim. Noted to be neutropenic during this past cycle- to follow up in outpatient infusion clinic for Maranda Oswald RN to coordinate this.  2) Chest pain: Mild, no signs of acute coronary syndrome, pneumonia, or PE. Agree with Hafsa that this may be due to heartburn- she denies need for intervention. Continue to monitor. Reviewed s/sxs ACS, PE, PNA and when to seek  further care.  3) Numbness to fingers: Stable, not impacting function. Discussed the typical presentation of neuropathy (beginning in toes first) and that her numbness may be due to carpal tunnel but to continue to monitor this and continue L-glutamine/vitamin B6. Recommend using wrist braces while sleeping to see if this improves the numbness in her thumb.  4) Sleep/anxiety: Worsens on days she receives steroids. Continue with trazodone per her PCP's management. Discussed that lorazepam and Xanax are both benzodiazepines and not to take these together. To see her psychology provider for anxiety management.   5) Arthralgias: Currently asymptomatic- to continue with Claritin daily and to take gabapentin 100mg PO TID the week after chemotherapy for taxane pain.  6) Genetic counseling: Risk factors have been assessed and patient does meet qualifications for genetic counseling per NCCN guidelines- will need to be addressed at an upcoming visit.  7) Health maintenance issues discussed include to follow up with PCP for routine health maintenance exams, co-morbid conditions, and non-gynecologic concerns.  8) Patient verbalized understanding of and agreement with plan    A total of 35 minutes of face to face time were spent with the patient with over 50% of that time spent in counseling, coordination of care, education, and symptom management.    MARLON Ogden, FNP-C  Family Nurse Practitioner  Gynecologic Oncology  [facility]  Pager: [phone]

## 2017-09-25 NOTE — PROGRESS NOTES
DATE/TIME  (DOT-TD, DOT-NOW) CHEMO CHECK ACTIVITY (REGIMEN & DOSE CHECK, DAY, DOSE #, NAME OF CHEMO #1)  CHEMO DRUG #2  CHEMO DRUG #3 NAME OF RN #1 (USE DOT-ME HERE) NAME OF RN#2 (2ND RN TO LOG IN SEPARATELY)   9/25/2017     Taxol desensitization labs dose checked Carbo labs, dose, regimen checked  Amanda WHITMORE

## 2017-09-25 NOTE — PROGRESS NOTES
SPIRITUAL HEALTH SERVICES  SPIRITUAL ASSESSMENT Progress Note  [facility] ([place] 7C    Attempted to visit pt per request on admission but found that Hafsa was not in her room.    Will notify the 9/26 on call  of the unfulfilled request.    Deven Sterling M.Div.  Staff   Pager [phone]

## 2017-09-25 NOTE — NURSING NOTE
"Oncology Rooming Note    September 25, 2017 9:49 AM   Hafsa Corona is a 62 year old female who presents for:    Chief Complaint   Patient presents with     Port Draw     labs drawn via port     Oncology Clinic Visit     return patient visit for follow up related to ovarian cancer     Initial Vitals: /76 (BP Location: Right arm, Patient Position: Chair, Cuff Size: Adult Regular)  Pulse 101  Temp 97.9  F (36.6  C) (Oral)  Ht 1.6 m (5' 3\")  Wt 82.6 kg (182 lb)  LMP 01/01/2009  SpO2 96%  Breastfeeding? No  BMI 32.24 kg/m2 Estimated body mass index is 32.24 kg/(m^2) as calculated from the following:    Height as of this encounter: 1.6 m (5' 3\").    Weight as of this encounter: 82.6 kg (182 lb). Body surface area is 1.92 meters squared.  No Pain (0) Comment: Data Unavailable   Patient's last menstrual period was 01/01/2009.  Allergies reviewed: Yes  Medications reviewed: Yes    Medications: Medication refills not needed today.  Pharmacy name entered into [employer]:    [id number] - [place], [place] - [address] AT [facility] - [place], [place] - [address]    Clinical concerns: no concerns yoko was notified.    5 minutes for nursing intake (face to face time)     Esther Choi CMA              "

## 2017-09-25 NOTE — PROGRESS NOTES
Care Coordinator Note  Reviewed today's lab results and copy given to patient.  Patient will return on 9/27/17 for Neulasta injection.  Next chemo appointment discussed and patient given calendar.  Patient states she has a plan for managing post chemo side effects.  State she has had good days since last chemo.  Reviewed and completed WISAM form with patient.       Patient verbalized back understanding of the above information discussed.   Face to face time spent with patient:  20 minutes    Shanon SMITH, RN  Care Coordinator  Gynecologic Cancer   Office:  [phone]  Pager: [phone] #[phone]

## 2017-09-25 NOTE — IP AVS SNAPSHOT
[address] [address]    Phone:  [phone]                                       After Visit Summary   9/25/2017    Hafsa Corona    MRN: [id number]           After Visit Summary Signature Page     I have received my discharge instructions, and my questions have been answered. I have discussed any challenges I see with this plan with the nurse or doctor.    ..........................................................................................................................................  Patient/Patient Representative Signature      ..........................................................................................................................................  Patient Representative Print Name and Relationship to Patient    ..................................................               ................................................  Date                                            Time    ..........................................................................................................................................  Reviewed by Signature/Title    ...................................................              ..............................................  Date                                                            Time

## 2017-09-25 NOTE — MR AVS SNAPSHOT
After Visit Summary   9/25/2017    Hafsa Corona    MRN: [id number]           Patient Information     Date Of Birth          1955        Visit Information        Provider Department      9/25/2017 9:00 AM Augustina Noriega APRN CNP [facility]        Today's Diagnoses     Ovarian cancer, left (H)    -  1    Encounter for long-term (current) use of medications        Acute deep vein thrombosis (DVT) of left lower extremity, unspecified vein (H)        Chemotherapy-induced neutropenia (H)           Follow-ups after your visit        Your next 10 appointments already scheduled     Oct 06, 2017 12:00 PM CDT   (Arrive by 11:45 AM)   NEW WITH ROOM with Mariam Sams GC,  2 114 CONSULT [facility] ([facility])    [address] [place] [address]  2nd [facility] [address]   [phone]            Oct 06, 2017  1:15 PM CDT   [facility] Lab Draw with  [facility] LAB DRAW   [facility] Lab Draw ([facility])    [address] [place] [address]  2nd [facility] [address]   [phone]            Oct 16, 2017  7:45 AM CDT   [facility] Lab Draw with  [facility] LAB DRAW   [facility]    [address] [place] [address]  2nd [facility] [address]   [phone]            Oct 16, 2017  8:20 AM CDT   (Arrive by 8:05 AM)   Return Active Treatment with MARLON Ogden CNP   [facility] ([facility])    [address] [place] [address]  2nd [facility] [address]   [phone]            Oct 26, 2017  2:00 PM CDT   (Arrive by 1:45 PM)   RETURN WITH ROOM with Lola Vasquez, PhD LP,  2 118 CONSULT [facility] ([facility])    [address] [place] [address]  2nd [facility] [address]   [phone]            Nov 14, 2017 10:00 AM CST   (Arrive by 9:45 AM)   CT CHEST W CONTRAST with  UCCT1   [facility] ([facility])    [address] [facility]  1st Floor  [facility] [address] [phone]           Please bring any scans or X-rays taken at other hospitals, if similar tests were done. Also bring a list of your medicines, including vitamins, minerals and over-the-counter drugs. It is safest to leave personal items at home.  Be sure to tell your doctor:   If you have any allergies.   If there s any chance you are pregnant.   If you are breastfeeding.   If you have any special needs.  You will have contrast for this exam. To prepare:   Do not eat or drink for 2 hours before your exam. If you need to take medicine, you may take it with small sips of water. (We may ask you to take liquid medicine as well.)   The day before your exam, drink extra fluids at least six 8-ounce glasses (unless your doctor tells you to restrict your fluids).  Patients over 70 or patients with diabetes or kidney problems:   If you haven t had a blood test (creatinine test) within the last 30 days, go to your clinic or Diagnostic Imaging Department for this test.  If you have diabetes:   If your kidney function is normal, continue taking your metformin (Avandamet, Glucophage, Glucovance, Metaglip) on the day of your exam.   If your kidney function is abnormal, wait 48 hours before restarting this medicine.  Please wear loose clothing, such as a sweat suit or jogging clothes. Avoid snaps, zippers and other metal. We may ask you to undress and put on a hospital gown.  If you have any questions, please call the Imaging Department where you will have your exam.              Who to contact     If you have questions or need follow up information about today's clinic visit or your schedule please contact [facility] directly at [phone].  Normal or non-critical lab and imaging results will be communicated to you by MyChart, letter or phone within 4 business days after  "the clinic has received the results. If you do not hear from us within 7 days, please contact the clinic through [employer] or phone. If you have a critical or abnormal lab result, we will notify you by phone as soon as possible.  Submit refill requests through [employer] or call your pharmacy and they will forward the refill request to us. Please allow 3 business days for your refill to be completed.          Additional Information About Your Visit        QuarterSpot Information     Qu[employer]Spot lets you send messages to your doctor, view your test results, renew your prescriptions, schedule appointments and more. To sign up, go to [email or web address] . Click on \"Log in\" on the left side of the screen, which will take you to the Welcome page. Then click on \"Sign up Now\" on the right side of the page.     You will be asked to enter the access code listed below, as well as some personal information. Please follow the directions to create your username and password.     Your access code is: [email or web address]  Expires: 10/10/2017 11:16 AM     Your access code will  in 90 days. If you need help or a new code, please call your [place] clinic or [phone].        Care EveryWhere ID     This is your Care EveryWhere ID. This could be used by other organizations to access your [place] medical records  [id number]        Your Vitals Were     Pulse Temperature Height Last Period Pulse Oximetry Breastfeeding?    101 97.9  F (36.6  C) (Oral) 1.6 m (5' 3\") 2009 96% No    BMI (Body Mass Index)                   32.24 kg/m2            Blood Pressure from Last 3 Encounters:   17 (!) 140/96   17 120/76   09/15/17 96/64    Weight from Last 3 Encounters:   17 82.2 kg (181 lb 3.2 oz)   17 82.6 kg (182 lb)   09/15/17 81.7 kg (180 lb 1.6 oz)              We Performed the Following     Activated protein C resistance     Antithrombin III     Beta 2 Glycoprotein 1 Antibody IgG     Beta 2 Glycoprotein 1 Antibody IgM  "         Cardiolipin Carol IgG and IgM     CBC with platelets differential     CMP - Comprehensive Metabolic Panel     Factor 2 and 5 mutation analysis     Lupus Anticoagulant Panel     Mag     Protein C chromogenic     Protein S Antigen Free          Today's Medication Changes          These changes are accurate as of: 9/25/17 11:07 AM.  If you have any questions, ask your nurse or doctor.               These medicines have changed or have updated prescriptions.        Dose/Directions    metFORMIN 500 MG 24 hr tablet   Commonly known as:  GLUCOPHAGE-XR   This may have changed:    - how much to take  - when to take this  - additional instructions   Used for:  Type 2 diabetes mellitus without complication, without long-term current use of insulin (H)        Take two tabs by mouth once daily with evening meal.   Quantity:  180 tablet   Refills:  PRN                Primary Care Provider Office Phone # Fax #    Morelia Ayon [phone] [address] [facility] [address]        Equal Access to Services     BHUPINDER [facility] CenterJULIO : Hadii serena mckeono Soaxel, waaxda luqadaha, qaybta kaalmada adewoodyafrancesca, sujatha betts . So [facility] [phone].    ATENCIÓN: Si habla español, tiene a martínez disposición servicios gratuitos de asistencia lingüística. Elizabeth al [phone].    We comply with applicable federal civil rights laws and [place] laws. We do not discriminate on the basis of race, color, national origin, age, disability sex, sexual orientation or gender identity.            Thank you!     Thank you for choosing [facility]  for your care. Our goal is always to provide you with excellent care. Hearing back from our patients is one way we can continue to improve our services. Please take a few minutes to complete the written survey that you may receive in the mail after your visit with us. Thank you!             Your Updated Medication List - Protect  others around you: Learn how to safely use, store and throw away your medicines at www.disposemymeds.org.          This list is accurate as of: 9/25/17 11:07 AM.  Always use your most recent med list.                   Brand Name Dispense Instructions for use Diagnosis    acetaminophen 325 MG tablet    TYLENOL    100 tablet    Take 2 tablets (650 mg) by mouth every 6 hours as needed for mild pain    Cellulitis and abscess of leg, Mass of pelvis       atorvastatin 80 MG tablet    LIPITOR    90 tablet    Take 1 tablet (80 mg) by mouth daily    Hyperlipidemia LDL goal <100       CALCIUM-MAGNESIUM-VITAMIN D PO      Take 2 tablets by mouth daily        dexamethasone 4 MG tablet    DECADRON    2 tablet    Take 2 tablets (8 mg) by mouth every morning    Ovarian cancer, left (H), Encounter for long-term (current) use of medications       EPINEPHrine 0.3 MG/0.3ML injection 2-pack    EPIPEN/ADRENACLICK/or ANY BX GENERIC EQUIV    1 each    Inject 0.3 mLs (0.3 mg) into the muscle once as needed for anaphylaxis    Bee allergy status       escitalopram 20 MG tablet    LEXAPRO    30 tablet    Take 1 tablet (20 mg) by mouth daily    Major depressive disorder, recurrent episode, mild (H)       fluticasone 50 MCG/ACT spray    FLONASE    48 g    Spray 2 sprays into both nostrils daily    Chronic maxillary sinusitis       gabapentin 100 MG capsule    NEURONTIN    21 capsule    Take 1 capsule (100 mg) by mouth 3 times daily Start the day after chemo for one week    Pain in joint, multiple sites       lisinopril 10 MG tablet    PRINIVIL/ZESTRIL    90 tablet    Take 1 tablet (10 mg) by mouth daily    Essential hypertension       loratadine 10 MG tablet    CLARITIN    30 tablet    Take 1 tablet (10 mg) by mouth daily    Pain in joint, multiple sites       LORazepam 1 MG tablet    ATIVAN    30 tablet    Take 1 tablet (1 mg) by mouth every 6 hours as needed (nausea/vomiting, anxiety or sleep)    Ovarian cancer, left (H), Encounter for  "long-term (current) use of medications       metFORMIN 500 MG 24 hr tablet    GLUCOPHAGE-XR    180 tablet    Take two tabs by mouth once daily with evening meal.    Type 2 diabetes mellitus without complication, without long-term current use of insulin (H)       MULTIVITAMIN ADULT PO      Take 1 tablet by mouth daily        order for DME     1 Units    Equipment being ordered: blood pressure monitor    Essential hypertension       * order for DME     1 each    Compression stockings 20-30 mm Hg. To be wear when directed by Hematology team and while awake for the first two years post clot.    Long-term (current) use of anticoagulants, Acute deep vein thrombosis (DVT) of left lower extremity, unspecified vein (H)       * order for DME     1 Bottle    Plain packing strip 1/4\"    Abscess of leg       * order for DME     1 Box    Sterile zoey tipped applicators    Abscess of leg       * order for DME     3 Bottle    USE 1/4 INCH  WIDTH PLAIN NU GAUZE PACKING TO DO SELF WOUND CARE OF LEFT LOWER LEG ONCE DAILY. DIMENSIONS OF WOUND PRESENTLY ARE 2 X 2 INCH AND APPROXIMATELY 1/2 INCH DEEP.  USES 6 INCHES OF PACKING CURRENTLY FOR DRESSING CHANGE.  FAX TO [facility] [phone]    Wound of left leg       * order for DME     30 pad    STERILE ADHESIVE PAD BANDAGE AT LEAST 4X4 INCH IN SIZE NEEDED FOR DAILY WOUND CARE TO LEFT LOWER LEG. FAX TO [facility] [phone]    Leg wound, left       polyethylene glycol Packet    MIRALAX/GLYCOLAX    7 packet    Take 17 g by mouth daily    Mass of pelvis       PRO-BIOTIC BLEND PO      Take 1 capsule by mouth daily Enzymatic Therapy-probiotic pearls        prochlorperazine 10 MG tablet    COMPAZINE    30 tablet    Take 1 tablet (10 mg) by mouth every 6 hours as needed (nausea/vomiting)    Ovarian cancer, left (H), Encounter for long-term (current) use of medications       rivaroxaban ANTICOAGULANT 20 MG Tabs tablet    XARELTO    30 tablet    Take 1 tablet (20 mg) by mouth daily " (with dinner)    Long-term (current) use of anticoagulants, Acute deep vein thrombosis (DVT) of left lower extremity, unspecified vein (H)       senna-docusate 8.6-50 MG per tablet    SENOKOT-S;PERICOLACE    100 tablet    Take 2 tablets by mouth 2 times daily Start with 1 tablet PO BID, If no bowel movement in 24 hours, increase to 2 tablets PO BID.  Hold for loose stools.    Mass of pelvis       traZODone 50 MG tablet    DESYREL    180 tablet    TAKE 1 TO 2 TABLETS(50  MG) BY MOUTH EVERY NIGHT AS NEEDED FOR SLEEP    Insomnia, unspecified type       vitamin B complex with vitamin C Tabs tablet      Take 1 tablet by mouth daily        vitamin D 1000 UNITS capsule      Take 2,000 Units by mouth daily        VYVANSE PO      Take 50 mg by mouth daily        * Notice:  This list has 5 medication(s) that are the same as other medications prescribed for you. Read the directions carefully, and ask your doctor or other care provider to review them with you.

## 2017-09-26 VITALS
TEMPERATURE: 97.3 F | HEIGHT: 63 IN | SYSTOLIC BLOOD PRESSURE: 123 MMHG | RESPIRATION RATE: 16 BRPM | WEIGHT: 181.2 LBS | OXYGEN SATURATION: 97 % | HEART RATE: 94 BPM | BODY MASS INDEX: 32.11 KG/M2 | DIASTOLIC BLOOD PRESSURE: 77 MMHG

## 2017-09-26 LAB
APCR PPP: NORMAL {RATIO}
B2 GLYCOPROT1 IGG SERPL IA-ACNC: <0.6 U/ML
B2 GLYCOPROT1 IGM SERPL IA-ACNC: <0.9 U/ML
GLUCOSE BLDC GLUCOMTR-MCNC: 170 MG/DL (ref 70–99)
GLUCOSE BLDC GLUCOMTR-MCNC: 182 MG/DL (ref 70–99)
GLUCOSE BLDC GLUCOMTR-MCNC: 199 MG/DL (ref 70–99)
LA PPP-IMP: ABNORMAL

## 2017-09-26 PROCEDURE — 96411 CHEMO IV PUSH ADDL DRUG: CPT

## 2017-09-26 PROCEDURE — 25000128 H RX IP 250 OP 636: Performed by: NURSE PRACTITIONER

## 2017-09-26 PROCEDURE — 00000146 ZZHCL STATISTIC GLUCOSE BY METER IP

## 2017-09-26 PROCEDURE — 25000125 ZZHC RX 250: Performed by: NURSE PRACTITIONER

## 2017-09-26 PROCEDURE — 25000132 ZZH RX MED GY IP 250 OP 250 PS 637: Performed by: NURSE PRACTITIONER

## 2017-09-26 PROCEDURE — 25000128 H RX IP 250 OP 636: Performed by: OBSTETRICS & GYNECOLOGY

## 2017-09-26 PROCEDURE — G0378 HOSPITAL OBSERVATION PER HR: HCPCS

## 2017-09-26 PROCEDURE — 25000131 ZZH RX MED GY IP 250 OP 636 PS 637: Performed by: NURSE PRACTITIONER

## 2017-09-26 PROCEDURE — G0008 ADMIN INFLUENZA VIRUS VAC: HCPCS

## 2017-09-26 PROCEDURE — 96372 THER/PROPH/DIAG INJ SC/IM: CPT

## 2017-09-26 PROCEDURE — 90686 IIV4 VACC NO PRSV 0.5 ML IM: CPT | Performed by: OBSTETRICS & GYNECOLOGY

## 2017-09-26 PROCEDURE — 25000128 H RX IP 250 OP 636: Performed by: STUDENT IN AN ORGANIZED HEALTH CARE EDUCATION/TRAINING PROGRAM

## 2017-09-26 RX ADMIN — LISINOPRIL 10 MG: 10 TABLET ORAL at 00:23

## 2017-09-26 RX ADMIN — LORATADINE 10 MG: 10 TABLET ORAL at 09:11

## 2017-09-26 RX ADMIN — INSULIN ASPART 1 UNITS: 100 INJECTION, SOLUTION INTRAVENOUS; SUBCUTANEOUS at 08:47

## 2017-09-26 RX ADMIN — TRAZODONE HYDROCHLORIDE 50 MG: 50 TABLET ORAL at 00:18

## 2017-09-26 RX ADMIN — LORAZEPAM 1 MG: 0.5 TABLET ORAL at 00:17

## 2017-09-26 RX ADMIN — INSULIN ASPART 2 UNITS: 100 INJECTION, SOLUTION INTRAVENOUS; SUBCUTANEOUS at 12:41

## 2017-09-26 RX ADMIN — CARBOPLATIN 645 MG: 10 INJECTION, SOLUTION INTRAVENOUS at 10:16

## 2017-09-26 RX ADMIN — DEXAMETHASONE 8 MG: 4 TABLET ORAL at 08:47

## 2017-09-26 RX ADMIN — ESCITALOPRAM OXALATE 20 MG: 20 TABLET ORAL at 08:47

## 2017-09-26 RX ADMIN — SODIUM CHLORIDE, PRESERVATIVE FREE 5 ML: 5 INJECTION INTRAVENOUS at 13:17

## 2017-09-26 RX ADMIN — ONDANSETRON HYDROCHLORIDE 8 MG: 8 TABLET, FILM COATED ORAL at 08:48

## 2017-09-26 RX ADMIN — DIPHENHYDRAMINE HYDROCHLORIDE 50 MG: 50 CAPSULE ORAL at 09:51

## 2017-09-26 RX ADMIN — GABAPENTIN 100 MG: 100 CAPSULE ORAL at 08:47

## 2017-09-26 RX ADMIN — ATORVASTATIN CALCIUM 80 MG: 80 TABLET, FILM COATED ORAL at 00:15

## 2017-09-26 RX ADMIN — INFLUENZA A VIRUS A/MICHIGAN/45/2015 X-275 (H1N1) ANTIGEN (FORMALDEHYDE INACTIVATED), INFLUENZA A VIRUS A/HONG KONG/4801/2014 X-263B (H3N2) ANTIGEN (FORMALDEHYDE INACTIVATED), INFLUENZA B VIRUS B/PHUKET/3073/2013 ANTIGEN (FORMALDEHYDE INACTIVATED), AND INFLUENZA B VIRUS B/BRISBANE/60/2008 ANTIGEN (FORMALDEHYDE INACTIVATED) 0.5 ML: 15; 15; 15; 15 INJECTION, SUSPENSION INTRAMUSCULAR at 13:28

## 2017-09-26 NOTE — PLAN OF CARE
Problem: Patient Care Overview  Goal: Plan of Care/Patient Progress Review  Outcome: Adequate for Discharge Date Met:  09/26/17  Pt. Has met Observation Goals,Chemo Infusion complete. Pt. Up ad galileo. Coy. Reg. Diet. Voiding Spont. Denies pain, N/V. Discharge instructions reviewed with pt. Who was able to teachback re: diet, activity, meds and return visit. Pt. Ready for discharge now.

## 2017-09-26 NOTE — PLAN OF CARE
Problem: Patient Care Overview  Goal: Plan of Care/Patient Progress Review  Outcome: No Change  VSS. Pt denies c/o pain. Tolerating regular diet well; denies N/V. Pt tolerating chemo well. Currently receiving 4th bag of Taxol and once complete around 1000 will receive Carbo. Port blood return has been good. UOP adequate. Pt up ad galileo. Pt has some supplements in her bin to be labeled by pharmacy. PLAN: continue POC and working towards OBS goals.

## 2017-09-26 NOTE — PROGRESS NOTES
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 Reports no pain.  Tolerating regualr diet without nausea or vomiting.  Passing flatus, no BM.  Voiding spontaneously. She is ambulating without issues. Notes she hasn't slept much and feels like she is trying to catch up. She felt very awake/wired after taking steroids. No current concerns or complaints.     Objective:   Vitals:    09/25/17 2115 09/25/17 2130 09/25/17 2145 09/25/17 2203   BP: 110/65 111/63 115/70 113/68   BP Location:       Pulse: 85 86 85 86   Resp:       Temp:       TempSrc:       SpO2: 95% 96% 96% 97%   Weight:       Height:         EXAM:   General: appears well, in NAD  CV: regular rate  Resp: regular rate, no increased work of breathing on 2 LPM O2 via NC  Abdomen: soft, nontender, nondistended  Extremities: nontender, stable, chronic nonpitting 1+ edema      Labs:   Hgb 10.4, WBC 5, Plt 261 on admit- no AM labs  Results for orders placed or performed during the hospital encounter of 09/25/17 (from the past 24 hour(s))   Glucose by meter   Result Value Ref Range    Glucose 184 (H) 70 - 99 mg/dL   Glucose by meter   Result Value Ref Range    Glucose 218 (H) 70 - 99 mg/dL     Assessment: 62 year old HD#2 admitted for inpatient cycle #2 Taxol due to previous hypersensitivity reaction and carboplatin. No issues overnight, tolerating chemotherapy well and trying to catch up on sleep. Hyperglycemia secondary to decadron for h/o chemotherapy hypersensitivity.       Active Problem list:  Stage IC2 clear cell carcinoma of the ovary  H/o hypersensitivity reaction with Taxol x 2      Plan:    Disease: Stage IC2 clear cell carcinoma of the  ovary, Taxol hypersensitivity rxn x 2. Receiving dexamethasone and Taxol with plan for Carbo 9/26 AM. Patient will be set up for neulasta today.  FEN: Regular diet.   Pain: Tylenol prn.  Heme: CBC appropriate on admit for chemo. H/o LE DVTs, on home Xarelto  CV: HTN- continue home lisinopril. HLD- continue home atorvastatin.  Resp: NI  GI: GERD. Continue home probiotic. Famotidine with chemo. PRN laxative and antiemetics  : NI, voiding w/o issues   MSK: NI. Continue claritin. Will start neurontin at home for arthralgias associated with chemo  Neuro/Psych: Anxiety; PRN ativan ordered. On home Lexapro. Trazadone for sleep.  Endocrine: DM II. BGs elevated secondary to decadron for chemotherapy, continue to monitor & give medium SSI PRN. Home Metformin ordered.  ID: NI  PPx: SCDs, home Xarelto  Disposition: Observation for chemo and monitoring for hypersenstivitiy reaction, anticipate discharge today     Karla Alarcon MD, MPH  OB/GYN PGY4  9/26/2017 6:09 AM    Gyn Onc Pager [phone]

## 2017-09-26 NOTE — PLAN OF CARE
Problem: Patient Care Overview  Goal: Plan of Care/Patient Progress Review  Outcome: Therapy, progress towards functional goals is fair  Pt. Resting comfortably, no states needs. Taxol Chemo infusing on time as ordered with no S/S of reaction. Working toward , but not meeting Obs. Goals at this time.

## 2017-09-26 NOTE — PLAN OF CARE
Problem: Patient Care Overview  Goal: Plan of Care/Patient Progress Review  Outcome: Completed Date Met:  09/26/17  Pt. Received Carbo IV infusion with stable VSS, a good blood return on P.C. And no sign of reaction. Pre meds and Ativan given b/4 infusion. Pt. Has met observation goals. Ready for discharge once ride arrives.

## 2017-09-26 NOTE — PROGRESS NOTES
-vital signs normal or at patient baseline: met VSS  -tolerating oral intake to maintain hydration: met pt taking in PO   -chemo complete: not met, pt on 3rd bag of taxol

## 2017-09-26 NOTE — PLAN OF CARE
"Problem: Patient Care Overview  Goal: Plan of Care/Patient Progress Review  Outcome: Improving  /68  Pulse 86  Temp 97.6  F (36.4  C) (Oral)  Resp 18  Ht 1.6 m (5' 3\")  Wt 82.2 kg (181 lb 3.2 oz)  LMP 01/01/2009  SpO2 97%  BMI 32.1 kg/m2     Pt admitted for chemo. On 4th bag of taxol with no reaction. Blood return on port. VSS. Voiding spont. Up ad galileo. Anxious, prn dose of ativan available if she needs it. Regular diet tolerating well. BG q4hs, 184/218.       "

## 2017-09-26 NOTE — PLAN OF CARE
Problem: Patient Care Overview  Goal: Plan of Care/Patient Progress Review  Outcome: No Change  Observation goals:  -vital signs normal or at patient baseline: met, VSS  -tolerating oral intake to maintain hydration: met, pt taking in PO   -chemo complete: not met, pt on 4th bag of taxol

## 2017-09-27 ENCOUNTER — TELEPHONE (OUTPATIENT)
Dept: FAMILY MEDICINE | Facility: CLINIC | Age: 62
End: 2017-09-27

## 2017-09-27 ENCOUNTER — ALLIED HEALTH/NURSE VISIT (OUTPATIENT)
Dept: ONCOLOGY | Facility: CLINIC | Age: 62
End: 2017-09-27
Attending: OBSTETRICS & GYNECOLOGY
Payer: COMMERCIAL

## 2017-09-27 ENCOUNTER — CARE COORDINATION (OUTPATIENT)
Dept: CARE COORDINATION | Facility: CLINIC | Age: 62
End: 2017-09-27

## 2017-09-27 VITALS
WEIGHT: 182.9 LBS | HEART RATE: 79 BPM | DIASTOLIC BLOOD PRESSURE: 83 MMHG | OXYGEN SATURATION: 93 % | BODY MASS INDEX: 32.4 KG/M2 | RESPIRATION RATE: 18 BRPM | TEMPERATURE: 98 F | SYSTOLIC BLOOD PRESSURE: 118 MMHG

## 2017-09-27 DIAGNOSIS — T45.1X5A CHEMOTHERAPY-INDUCED NEUTROPENIA (H): Primary | ICD-10-CM

## 2017-09-27 DIAGNOSIS — D70.1 CHEMOTHERAPY-INDUCED NEUTROPENIA (H): Primary | ICD-10-CM

## 2017-09-27 DIAGNOSIS — Z79.899 ENCOUNTER FOR LONG-TERM (CURRENT) USE OF MEDICATIONS: ICD-10-CM

## 2017-09-27 DIAGNOSIS — C56.2 OVARIAN CANCER, LEFT (H): ICD-10-CM

## 2017-09-27 LAB
PROT C ACT/NOR PPP CHRO: 127 % (ref 70–170)
PROT S FREE AG ACT/NOR PPP IA: 100 % (ref 55–125)

## 2017-09-27 PROCEDURE — 25000128 H RX IP 250 OP 636: Mod: ZF | Performed by: NURSE PRACTITIONER

## 2017-09-27 PROCEDURE — 96372 THER/PROPH/DIAG INJ SC/IM: CPT

## 2017-09-27 RX ADMIN — PEGFILGRASTIM 6 MG: 6 INJECTION SUBCUTANEOUS at 12:36

## 2017-09-27 ASSESSMENT — PAIN SCALES - GENERAL: PAINLEVEL: NO PAIN (0)

## 2017-09-27 NOTE — NURSING NOTE
Patient presents to the [facility] infusion for Neulasta.  Order written by Dr. Shaffer was completed today. Name and  verified with patient. See MAR for medication details. Medication was given in the right arm subcutaneously. Patient tolerated injection well and was discharged to home.  Loan Wilson CMA

## 2017-09-27 NOTE — PROGRESS NOTES
Patient has clinic visit within 24-48 hours of Discharge so no post DC follow up call is needed          Sep 27, 2017 12:00 PM CDT   (Arrive by 11:45 AM)   [facility] Oncology Injection Nurse   [facility] ([facility])     [address] [place] [address] [phone]

## 2017-09-27 NOTE — MR AVS SNAPSHOT
After Visit Summary   9/27/2017    Hafsa Corona    MRN: [id number]           Patient Information     Date Of Birth          1955        Visit Information        Provider Department      9/27/2017 12:00 PM Nurse, [facility]        Today's Diagnoses     Chemotherapy-induced neutropenia (H)    -  1    Ovarian cancer, left (H)        Encounter for long-term (current) use of medications           Follow-ups after your visit        Your next 10 appointments already scheduled     Oct 06, 2017 12:00 PM CDT   (Arrive by 11:45 AM)   NEW WITH ROOM with Mariam Sams GC,  2 114 CONSULT [facility] ([facility])    [address]  2nd Floor  [facility] [address]   [phone]            Oct 06, 2017  1:15 PM CDT   [facility] Lab Draw with  MASONIC LAB DRAW   [facility] Lab Draw ([facility])    [address] [place] [address]  2nd [facility] [address]   [phone]            Oct 16, 2017  7:45 AM CDT   M[facility]ic Lab Draw with  MASONIC LAB DRAW   [facility] Lab Draw ([facility])    [address]  2nd [facility] [address]   [phone]            Oct 16, 2017  8:20 AM CDT   (Arrive by 8:05 AM)   Return Active Treatment with MAR[facility] ([facility])    [address]  2nd [facility] [address]   [phone]            Oct 26, 2017  2:00 PM CDT   (Arrive by 1:45 PM)   RETURN WITH ROOM with Lola Vasquez, PhD LP,  2 118 CONSULT [facility] ([facility])    [address]  2nd Floor  [facility] [address]   [phone]            Nov 14, 2017 10:00 AM CST   (Arrive by 9:45 AM)   CT CHEST W CONTRAST with UCCT1   [facility] [place] [facility] ([facility])    [address] [id number] [phone]           Please bring any scans or X-rays taken at other hospitals, if similar tests were done. Also bring a list of your medicines, including vitamins, minerals and over-the-counter drugs. It is safest to leave personal items at home.  Be sure to tell your doctor:   If you have any allergies.   If there s any chance you are pregnant.   If you are breastfeeding.   If you have any special needs.  You will have contrast for this exam. To prepare:   Do not eat or drink for 2 hours before your exam. If you need to take medicine, you may take it with small sips of water. (We may ask you to take liquid medicine as well.)   The day before your exam, drink extra fluids at least six 8-ounce glasses (unless your doctor tells you to restrict your fluids).  Patients over 70 or patients with diabetes or kidney problems:   If you haven t had a blood test (creatinine test) within the last 30 days, go to your clinic or Diagnostic Imaging Department for this test.  If you have diabetes:   If your kidney function is normal, continue taking your metformin (Avandamet, Glucophage, Glucovance, Metaglip) on the day of your exam.   If your kidney function is abnormal, wait 48 hours before restarting this medicine.  Please wear loose clothing, such as a sweat suit or jogging clothes. Avoid snaps, zippers and other metal. We may ask you to undress and put on a hospital gown.  If you have any questions, please call the Imaging Department where you will have your exam.              Who to contact     If you have questions or need follow up information about today's clinic visit or your schedule please contact [facility] directly at [phone].  Normal or non-critical lab and imaging results will be communicated to you by MyChart, letter or phone within 4 business days after the clinic has received the results. If you do not hear from us within 7 days,  "please contact the clinic through [employer] or phone. If you have a critical or abnormal lab result, we will notify you by phone as soon as possible.  Submit refill requests through [employer] or call your pharmacy and they will forward the refill request to us. Please allow 3 business days for your refill to be completed.          Additional Information About Your Visit        [facility] Information     [employer] lets you send messages to your doctor, view your test results, renew your prescriptions, schedule appointments and more. To sign up, go to [email or web address] . Click on \"Log in\" on the left side of the screen, which will take you to the Welcome page. Then click on \"Sign up Now\" on the right side of the page.     You will be asked to enter the access code listed below, as well as some personal information. Please follow the directions to create your username and password.     Your access code is: [email or web address]  Expires: 10/10/2017 11:16 AM     Your access code will  in 90 days. If you need help or a new code, please call your [place] clinic or [phone].        Care EveryWhere ID     This is your Care EveryWhere ID. This could be used by other organizations to access your [place] medical records  [id number]        Your Vitals Were     Pulse Temperature Respirations Last Period Pulse Oximetry BMI (Body Mass Index)    79 98  F (36.7  C) (Oral) 18 2009 93% 32.4 kg/m2       Blood Pressure from Last 3 Encounters:   17 118/83   17 123/77   17 120/76    Weight from Last 3 Encounters:   17 83 kg (182 lb 14.4 oz)   17 82.2 kg (181 lb 3.2 oz)   17 82.6 kg (182 lb)              Today, you had the following     No orders found for display         Today's Medication Changes          These changes are accurate as of: 17  1:00 PM.  If you have any questions, ask your nurse or doctor.               These medicines have changed or have updated prescriptions.        " Dose/Directions    metFORMIN 500 MG 24 hr tablet   Commonly known as:  GLUCOPHAGE-XR   This may have changed:    - how much to take  - when to take this  - additional instructions   Used for:  Type 2 diabetes mellitus without complication, without long-term current use of insulin (H)        Take two tabs by mouth once daily with evening meal.   Quantity:  180 tablet   Refills:  PRN                Primary Care Provider Office Phone # Fax #    Morelia AyonATTILA [phone] [address] [address]        Equal Access to Services     [facility]: Hadii aad ku hadasho Soomaali, waaxda luqadaha, qaybta kaalmada adeegyafrancesca, waxbrent betts . So [facility] [phone].    ATENCIÓN: Si habla español, tiene a martínez disposición servicios gratuitos de asistencia lingüística. [facility] [phone].    We comply with applicable federal civil rights laws and [place] laws. We do not discriminate on the basis of race, color, national origin, age, disability sex, sexual orientation or gender identity.            Thank you!     Thank you for choosing [facility]  for your care. Our goal is always to provide you with excellent care. Hearing back from our patients is one way we can continue to improve our services. Please take a few minutes to complete the written survey that you may receive in the mail after your visit with us. Thank you!             Your Updated Medication List - Protect others around you: Learn how to safely use, store and throw away your medicines at www.disposemymeds.org.          This list is accurate as of: 9/27/17  1:00 PM.  Always use your most recent med list.                   Brand Name Dispense Instructions for use Diagnosis    acetaminophen 325 MG tablet    TYLENOL    100 tablet    Take 2 tablets (650 mg) by mouth every 6 hours as needed for mild pain    Cellulitis and abscess of leg, Mass of pelvis       atorvastatin 80 MG  tablet    LIPITOR    90 tablet    Take 1 tablet (80 mg) by mouth daily    Hyperlipidemia LDL goal <100       CALCIUM-MAGNESIUM-VITAMIN D PO      Take 2 tablets by mouth daily        dexamethasone 4 MG tablet    DECADRON    2 tablet    Take 2 tablets (8 mg) by mouth every morning    Ovarian cancer, left (H), Encounter for long-term (current) use of medications       EPINEPHrine 0.3 MG/0.3ML injection 2-pack    EPIPEN/ADRENACLICK/or ANY BX GENERIC EQUIV    1 each    Inject 0.3 mLs (0.3 mg) into the muscle once as needed for anaphylaxis    Bee allergy status       escitalopram 20 MG tablet    LEXAPRO    30 tablet    Take 1 tablet (20 mg) by mouth daily    Major depressive disorder, recurrent episode, mild (H)       fluticasone 50 MCG/ACT spray    FLONASE    48 g    Spray 2 sprays into both nostrils daily    Chronic maxillary sinusitis       gabapentin 100 MG capsule    NEURONTIN    21 capsule    Take 1 capsule (100 mg) by mouth 3 times daily Start the day after chemo for one week    Pain in joint, multiple sites       lisinopril 10 MG tablet    PRINIVIL/ZESTRIL    90 tablet    Take 1 tablet (10 mg) by mouth daily    Essential hypertension       loratadine 10 MG tablet    CLARITIN    30 tablet    Take 1 tablet (10 mg) by mouth daily    Pain in joint, multiple sites       LORazepam 1 MG tablet    ATIVAN    30 tablet    Take 1 tablet (1 mg) by mouth every 6 hours as needed (nausea/vomiting, anxiety or sleep)    Ovarian cancer, left (H), Encounter for long-term (current) use of medications       metFORMIN 500 MG 24 hr tablet    GLUCOPHAGE-XR    180 tablet    Take two tabs by mouth once daily with evening meal.    Type 2 diabetes mellitus without complication, without long-term current use of insulin (H)       MULTIVITAMIN ADULT PO      Take 1 tablet by mouth daily        order for DME     1 Units    Equipment being ordered: blood pressure monitor    Essential hypertension       * order for DME     1 each    Compression  "stockings 20-30 mm Hg. To be wear when directed by Hematology team and while awake for the first two years post clot.    Long-term (current) use of anticoagulants, Acute deep vein thrombosis (DVT) of left lower extremity, unspecified vein (H)       * order for DME     1 Bottle    Plain packing strip 1/4\"    Abscess of leg       * order for DME     1 Box    Sterile zoey tipped applicators    Abscess of leg       * order for DME     3 Bottle    USE 1/4 INCH  WIDTH PLAIN NU GAUZE PACKING TO DO SELF WOUND CARE OF LEFT LOWER LEG ONCE DAILY. DIMENSIONS OF WOUND PRESENTLY ARE 2 X 2 INCH AND APPROXIMATELY 1/2 INCH DEEP.  USES 6 INCHES OF PACKING CURRENTLY FOR DRESSING CHANGE.  FAX TO [facility] [phone]    Wound of left leg       * order for DME     30 pad    STERILE ADHESIVE PAD BANDAGE AT LEAST 4X4 INCH IN SIZE NEEDED FOR DAILY WOUND CARE TO LEFT LOWER LEG. FAX TO [facility] [phone]    Leg wound, left       polyethylene glycol Packet    MIRALAX/GLYCOLAX    7 packet    Take 17 g by mouth daily    Mass of pelvis       PRO-BIOTIC BLEND PO      Take 1 capsule by mouth daily Enzymatic Therapy-probiotic pearls        prochlorperazine 10 MG tablet    COMPAZINE    30 tablet    Take 1 tablet (10 mg) by mouth every 6 hours as needed (nausea/vomiting)    Ovarian cancer, left (H), Encounter for long-term (current) use of medications       rivaroxaban ANTICOAGULANT 20 MG Tabs tablet    XARELTO    30 tablet    Take 1 tablet (20 mg) by mouth daily (with dinner)    Long-term (current) use of anticoagulants, Acute deep vein thrombosis (DVT) of left lower extremity, unspecified vein (H)       senna-docusate 8.6-50 MG per tablet    SENOKOT-S;PERICOLACE    100 tablet    Take 2 tablets by mouth 2 times daily Start with 1 tablet PO BID, If no bowel movement in 24 hours, increase to 2 tablets PO BID.  Hold for loose stools.    Mass of pelvis       traZODone 50 MG tablet    DESYREL    180 tablet    TAKE 1 TO 2 TABLETS(50 TO " 100 MG) BY MOUTH EVERY NIGHT AS NEEDED FOR SLEEP    Insomnia, unspecified type       vitamin B complex with vitamin C Tabs tablet      Take 1 tablet by mouth daily        vitamin D 1000 UNITS capsule      Take 2,000 Units by mouth daily        VYVANSE PO      Take 50 mg by mouth daily        * Notice:  This list has 5 medication(s) that are the same as other medications prescribed for you. Read the directions carefully, and ask your doctor or other care provider to review them with you.

## 2017-09-29 ENCOUNTER — CARE COORDINATION (OUTPATIENT)
Dept: ONCOLOGY | Facility: CLINIC | Age: 62
End: 2017-09-29

## 2017-09-29 LAB — COPATH REPORT: NORMAL

## 2017-09-29 NOTE — PROGRESS NOTES
Care Coordinator Note  Call from patient to update how she is feeling post chemotherapy, before weekend.  States appetite is low, fluid intake is low.  She is having less pain than last chemo cycle, but having weakness in legs and knees and feels dizzy when up.  States she started Gabapentin last evening for tingling in hands and feet, has had two doses at this point.  States she has been having loose stools and today diarrhea.  Discussed that dizziness is likely secondary to her poor intake.  She is encouraged to increase her fluid intake and maximize her calorie intake with nutritional supplements, juice.  Also discussed soft food diet.  Reviewed phone numbers to call if need this weekend.  States she will alternate Tylenol with Gabapentin.  She understands that her symptoms should improve in the days ahead.       Patient verbalized back understanding of the above information discussed.     Shanon SMITH, RN  Care Coordinator  Gynecologic Cancer   Office:  [phone]  Pager: [phone] #[phone]

## 2017-10-04 ENCOUNTER — CARE COORDINATION (OUTPATIENT)
Dept: ONCOLOGY | Facility: CLINIC | Age: 62
End: 2017-10-04

## 2017-10-04 DIAGNOSIS — C56.1 MALIGNANT NEOPLASM OF RIGHT OVARY (H): ICD-10-CM

## 2017-10-04 DIAGNOSIS — C56.2 OVARIAN CANCER, LEFT (H): Primary | ICD-10-CM

## 2017-10-04 DIAGNOSIS — Z79.899 ENCOUNTER FOR LONG-TERM (CURRENT) USE OF MEDICATIONS: ICD-10-CM

## 2017-10-04 RX ORDER — LORAZEPAM 1 MG/1
1 TABLET ORAL EVERY 6 HOURS PRN
Qty: 30 TABLET | Refills: 1 | Status: SHIPPED | OUTPATIENT
Start: 2017-10-04 | End: 2022-07-06

## 2017-10-04 NOTE — PROGRESS NOTES
Care Coordinator Note  Call from patient to update me regarding her post cycle #2 chemotherapy experience.  Patient states she had shooting and deep leg pain that started Friday evening and increased in intensity through Monday, also numbness and tingling in hands and feet.  States she was weak in the knees, felt unsteady, hard to ambulate.  Patient interested in acupuncture and/or seeing a neuropathy expert.  She is concerned that her mother was treated for breast cancer twice and had neuropathy that lasted all her life.  Patient asks if the Neulasta post chemo can be deleted.  Patient had questions about scan and Dr. Shaffer appointment post treatment.  These appointments will be scheduled.  Refill request on Lorazepam sent to pharmacy.       Patient verbalized back understanding of the above information discussed.     Shanon Oswald MSN, RN  Care Coordinator  Gynecologic Cancer   Office:  [phone]  Pager: [phone] #[phone]

## 2017-10-06 ENCOUNTER — OFFICE VISIT (OUTPATIENT)
Dept: ONCOLOGY | Facility: CLINIC | Age: 62
End: 2017-10-06
Attending: GENETIC COUNSELOR, MS
Payer: COMMERCIAL

## 2017-10-06 DIAGNOSIS — C56.2 MALIGNANT NEOPLASM OF LEFT OVARY (H): Primary | ICD-10-CM

## 2017-10-06 DIAGNOSIS — Z80.0 FAMILY HISTORY OF PANCREATIC CANCER: ICD-10-CM

## 2017-10-06 DIAGNOSIS — Z80.0 FAMILY HISTORY OF COLON CANCER: ICD-10-CM

## 2017-10-06 DIAGNOSIS — Z80.3 FAMILY HISTORY OF MALIGNANT NEOPLASM OF BREAST: ICD-10-CM

## 2017-10-06 DIAGNOSIS — C56.2 MALIGNANT NEOPLASM OF LEFT OVARY (H): ICD-10-CM

## 2017-10-06 LAB — MISCELLANEOUS TEST: NORMAL

## 2017-10-06 PROCEDURE — 25000128 H RX IP 250 OP 636: Performed by: GENETIC COUNSELOR, MS

## 2017-10-06 PROCEDURE — 36591 DRAW BLOOD OFF VENOUS DEVICE: CPT

## 2017-10-06 PROCEDURE — 96040 ZZH GENETIC COUNSELING, EACH 30 MINUTES: CPT | Mod: ZF | Performed by: GENETIC COUNSELOR, MS

## 2017-10-06 RX ORDER — HEPARIN SODIUM (PORCINE) LOCK FLUSH IV SOLN 100 UNIT/ML 100 UNIT/ML
5 SOLUTION INTRAVENOUS EVERY 8 HOURS
Status: DISCONTINUED | OUTPATIENT
Start: 2017-10-06 | End: 2017-10-14 | Stop reason: HOSPADM

## 2017-10-06 RX ADMIN — SODIUM CHLORIDE, PRESERVATIVE FREE 5 ML: 5 INJECTION INTRAVENOUS at 13:56

## 2017-10-06 NOTE — LETTER
Cancer Risk Management  Program Locations    [facility]  [facility]  [facility]  Mailing Address  Cancer Risk Management Program  [facility]  [address] [place] [address]    New patient appointments  [phone]  October 6, 2017    Hafsa Corona  [address]  [place] [address]      Dear Hafsa,    It was a pleasure meeting with you at the [facility] on 10/6/2017.  Here is a copy of the progress note from your recent genetic counseling visit to the Cancer Risk Management Program.  If you have any additional questions, please feel free to call.        10/6/2017    Referring Provider: Jarod Shaffer J., MD    Presenting Information:   I met with Hafsa Corona today for genetic counseling at the Cancer Risk Management Program at the [facility] to discuss her recent ovarian cancer diagnosis, Ashkenazi Nondenominational ancestry, and family history of multiple other cancer.  She is here today to review this history, cancer screening recommendations, and available genetic testing options.    Personal History:  Hafsa is a 62 year old female.  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 Hafsa reports she underwent salpingo oophorectomy of the right ovary in 2008 due to an abnormal ultrasound result that turned out to be benign.      She had her first menstrual period at age 10 and is postmenopausal. She did not have any children. Her most recent  results were normal. She reports about 6 years of oral contraceptive use in her late teens into her 20s and no hormone replacement therapy.      She has regular clinical breast exams and mammograms. Her most recent mammogram was on 9/15/2017  and results are pending. Her mammogram in 2014 was normal. She did undergo a breast biopsy back in 1985 at age 30 due to a cyst that was benign. She received colonoscopy on 11/23/2013 in which a tubular adenoma was found in the cecum. Follow up in 5 years was recommended. She does not regularly do any other cancer screening at this time.  Hafsa reported no tobacco use, and very occasional alcohol use.    Family History: (Please see scanned pedigree for detailed family history information) Per Hafsa, her family history is significant for the following:    Hafsa's mother was diagnosed with breast cancer at age 63, which recurred at age 68. She was also diagnosed with colon cancer at age 82, which metastasized to the bones. She passed away at age 84.    Hafsa's father was diagnosed with pancreatic cancer and passed away at age 79.    Hafsa's paternal uncle Jimbo was diagnosed with pancreatic cancer and passed away in his 30s.     Her maternal ethnicity is Ashkenazi Jainism. Her paternal ethnicity is Ashkenazi Jainism. There is no reported consanguinity.    Discussion:    Hafsa's personal and family history of multiple cancers as well as her ancestry is suggestive of a possible cancer susceptibility gene in the family. Given her diagnosis of ovarian cancer, we discussed Sullivan syndrome and Hereditary Breast and Ovarian Cancer syndrome as the two most likely hereditary explanations.    We reviewed the features of sporadic, familial, and hereditary cancers.  In looking at Hafsa's family history, it is possible that a cancer susceptibility gene is present due to her ancestry, personal diagnosis with ovarian cancer, and two close relatives with pancreatic cancer on the paternal side or breast cancer and colon cancer on the maternal side.    We discussed the natural history and genetics of Hereditary Breast and Ovarian cancer syndrome due to BRCA1 and BRCA2 gene mutations. We also discussed the natural history and genetics of  Sullivan syndrome due to mismatch repair gene mutations (MLH1, MSH2, MSH6, PMS2, EPCAM).      A detailed handout regarding hereditary gynecologic cancer risk genes and the information we discussed was provided to Hafsa at the end of our appointment today and can be found in the after visit summary.  Topics included: inheritance pattern, cancer risks, cancer screening recommendations, and also risks, benefits and limitations of testing.    Based on her personal history, Hafsa meets current National Comprehensive Cancer Network (NCCN) criteria for genetic testing of BRCA1 and BRCA2.    We discussed that there are three common mutations in the BRCA genes that are common in the Ashkenazi Mormon population.  About 1 in 40 individuals of Ashkenazi Mormon background have one of these three mutations, which account for about 90% of the BRCA mutations in this population.      Genetic testing of the BRCA genes in individuals of Ashkenazi Mormon decent typically begins with testing for the three common mutations.  If these three mutations are not detected, there is additional testing that can be done.  We discussed the option of reflex testing to full sequencing and deletion/duplication analysis of the BRCA1 and BRCA2 genes.  Other options for testing include a multigene panel, such as OvaNext.    We discussed that there are additional genes that could cause increased risk for ovarian cancer.  As many of these genes present with overlapping features in a family and accurate cancer risk cannot always be established based upon the pedigree analysis alone, it would be reasonable for Hafsa to consider panel genetic testing to analyze multiple genes at once.    Hafsa expressed understanding and wanted to consider multigene panel.    Genetic testing is available for 25 genes associated with hereditary gynecologic cancers: OvaNext (COSTA, BARD1, BRCA1, BRCA2, BRIP1, CDH1, CHEK2, DICER1, EPCAM, MLH1, MRE11A, MSH2, MSH6, MUTYH, NBN, NF1,  PALB2, PMS2, PTEN, RAD50, RAD51C, RAD51D, SMARCA4, STK11, and TP53).    We discussed that some of the genes in the OvaNext panel are associated with specific hereditary cancer syndromes and have published management guidelines: Hereditary Breast and Ovarian Cancer syndrome (BRCA1, BRCA2), Sullivan syndrome (MLH1, MSH2, MSH6, PMS2, EPCAM), Hereditary Diffuse Gastric Cancer (CDH1), Cowden syndrome (PTEN), Li Fraumeni syndrome (TP53), Peutz-Jeghers syndrome (STK11), MUTYH Associated Polyposis syndrome (MUTYH), and Neurofibromatosis type 1 (NF1).      Risk-reducing salpingo-oophorectomy can be considered in women with mutations in BRIP1, RAD51C, or RAD51D.  Breast cancer risk management guidelines are available for COSTA, CHEK2, PALB2, NF1, and NBN.    The remaining genes (BARD1, DICER1, MRE11A, RAD50, and SMARCA4) are associated with increased cancer risk and may allow us to make medical recommendations when mutations are identified.      Consent was obtained and genetic testing for OvaNext was sent to [facility]. Turn around time: approximately 5 weeks.    Medical Management: For Hafsa, we reviewed that the information from genetic testing may determine:    additional cancer screening for which Hafsa may qualify (i.e. mammogram and breast MRI, more frequent colonoscopies, more frequent dermatologic exams, etc.),    and targeted chemotherapies for Hafsa's active cancer, or if she were to develop certain cancers in the future (i.e. immunotherapy for individuals with Sullivan syndrome, PARP inhibitors, etc.).     These recommendations and possible targeted chemotherapies will be discussed in detail once genetic testing is completed.       Plan:  1) Today Hafsa elected to proceed with OvaNext genetic test through [facility].  2) This information should be available in 4-6 weeks.  3) Hafsa will return to clinic to discuss the results    Face to face time: 50 minutes    Mariam Sams MS, [facility]  Certified Genetic  Counselor  T: [phone] / T: [phone]  F: [phone]

## 2017-10-06 NOTE — PROGRESS NOTES
10/6/2017    Referring Provider: Jarod Shaffer J., MD    Presenting Information:   I met with Hafsa Corona today for genetic counseling at the Cancer Risk Management Program at the [facility] to discuss her recent ovarian cancer diagnosis, Ashkenazi Sabianist ancestry, and family history of multiple other cancer.  She is here today to review this history, cancer screening recommendations, and available genetic testing options.    Personal History:  Hafsa is a 62 year old female.  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 Hafsa reports she underwent salpingo oophorectomy of the right ovary in 2008 due to an abnormal ultrasound result that turned out to be benign.      She had her first menstrual period at age 10 and is postmenopausal. She did not have any children. Her most recent  results were normal. She reports about 6 years of oral contraceptive use in her late teens into her 20s and no hormone replacement therapy.      She has regular clinical breast exams and mammograms. Her most recent mammogram was on 9/15/2017 and results are pending. Her mammogram in 2014 was normal. She did undergo a breast biopsy back in 1985 at age 30 due to a cyst that was benign. She received colonoscopy on 11/23/2013 in which a tubular adenoma was found in the cecum. Follow up in 5 years was recommended. She does not regularly do any other cancer screening at this time.  Hafsa reported no tobacco use, and very occasional alcohol use.    Family History: (Please see scanned pedigree for detailed family history information) Per Hafsa, her family history is significant for the following:    Hafsa's mother was diagnosed with breast cancer at age 63, which recurred at age 68. She was also diagnosed with colon cancer at age 82, which metastasized to the bones. She passed away at age 84.    Hafsa's father  was diagnosed with pancreatic cancer and passed away at age 79.    Hafsa's paternal uncle Jimbo was diagnosed with pancreatic cancer and passed away in his 30s.     Her maternal ethnicity is Ashkenazi Yarsanism. Her paternal ethnicity is Ashkenazi Yarsanism. There is no reported consanguinity.    Discussion:    Hafsa's personal and family history of multiple cancers as well as her ancestry is suggestive of a possible cancer susceptibility gene in the family. Given her diagnosis of ovarian cancer, we discussed Sullivan syndrome and Hereditary Breast and Ovarian Cancer syndrome as the two most likely hereditary explanations.    We reviewed the features of sporadic, familial, and hereditary cancers.  In looking at Hafsa's family history, it is possible that a cancer susceptibility gene is present due to her ancestry, personal diagnosis with ovarian cancer, and two close relatives with pancreatic cancer on the paternal side or breast cancer and colon cancer on the maternal side.    We discussed the natural history and genetics of Hereditary Breast and Ovarian cancer syndrome due to BRCA1 and BRCA2 gene mutations. We also discussed the natural history and genetics of Sullivan syndrome due to mismatch repair gene mutations (MLH1, MSH2, MSH6, PMS2, EPCAM).      A detailed handout regarding hereditary gynecologic cancer risk genes and the information we discussed was provided to Hafsa at the end of our appointment today and can be found in the after visit summary.  Topics included: inheritance pattern, cancer risks, cancer screening recommendations, and also risks, benefits and limitations of testing.    Based on her personal history, Hafsa meets current National Comprehensive Cancer Network (NCCN) criteria for genetic testing of BRCA1 and BRCA2.    We discussed that there are three common mutations in the BRCA genes that are common in the Ashkenazi Yarsanism population.  About 1 in 40 individuals of Ashkenazi Yarsanism background have one of  these three mutations, which account for about 90% of the BRCA mutations in this population.      Genetic testing of the BRCA genes in individuals of Ashkenazi Church decent typically begins with testing for the three common mutations.  If these three mutations are not detected, there is additional testing that can be done.  We discussed the option of reflex testing to full sequencing and deletion/duplication analysis of the BRCA1 and BRCA2 genes.  Other options for testing include a multigene panel, such as OvaNext.    We discussed that there are additional genes that could cause increased risk for ovarian cancer.  As many of these genes present with overlapping features in a family and accurate cancer risk cannot always be established based upon the pedigree analysis alone, it would be reasonable for Hafsa to consider panel genetic testing to analyze multiple genes at once.    Hafsa expressed understanding and wanted to consider multigene panel.    Genetic testing is available for 25 genes associated with hereditary gynecologic cancers: OvaNext (COSTA, BARD1, BRCA1, BRCA2, BRIP1, CDH1, CHEK2, DICER1, EPCAM, MLH1, MRE11A, MSH2, MSH6, MUTYH, NBN, NF1, PALB2, PMS2, PTEN, RAD50, RAD51C, RAD51D, SMARCA4, STK11, and TP53).    We discussed that some of the genes in the OvaNext panel are associated with specific hereditary cancer syndromes and have published management guidelines: Hereditary Breast and Ovarian Cancer syndrome (BRCA1, BRCA2), Sullivan syndrome (MLH1, MSH2, MSH6, PMS2, EPCAM), Hereditary Diffuse Gastric Cancer (CDH1), Cowden syndrome (PTEN), Li Fraumeni syndrome (TP53), Peutz-Jeghers syndrome (STK11), MUTYH Associated Polyposis syndrome (MUTYH), and Neurofibromatosis type 1 (NF1).      Risk-reducing salpingo-oophorectomy can be considered in women with mutations in BRIP1, RAD51C, or RAD51D.  Breast cancer risk management guidelines are available for COSTA, CHEK2, PALB2, NF1, and NBN.    The remaining genes (BARD1,  DICER1, MRE11A, RAD50, and SMARCA4) are associated with increased cancer risk and may allow us to make medical recommendations when mutations are identified.      Consent was obtained and genetic testing for OvaNext was sent to [facility]. Turn around time: approximately 5 weeks.    Medical Management: For Hafsa, we reviewed that the information from genetic testing may determine:    additional cancer screening for which Hafsa may qualify (i.e. mammogram and breast MRI, more frequent colonoscopies, more frequent dermatologic exams, etc.),    and targeted chemotherapies for Hafsa's active cancer, or if she were to develop certain cancers in the future (i.e. immunotherapy for individuals with Sullivan syndrome, PARP inhibitors, etc.).     These recommendations and possible targeted chemotherapies will be discussed in detail once genetic testing is completed.       Plan:  1) Today Hafsa elected to proceed with OvaNext genetic test through [facility].  2) This information should be available in 4-6 weeks.  3) Hafsa will return to clinic to discuss the results    Face to face time: 50 minutes    Mariam Sams MS, [facility] [place]  Certified Genetic Counselor  T: [phone] / T: [phone]  F: [phone]

## 2017-10-06 NOTE — LETTER
10/6/2017       RE: Hafsa Corona  [address]  [place] [address]     Dear Colleague,    Thank you for referring your patient, Hafsa Corona, to the [facility]. Please see a copy of my visit note below.    10/6/2017    Referring Provider: Jarod Shaffer J., MD    Presenting Information:   I met with Hafsa Corona today for genetic counseling at the Cancer Risk Management Program at the [facility] to discuss her recent ovarian cancer diagnosis, Ashkenazi Denominational ancestry, and family history of multiple other cancer.  She is here today to review this history, cancer screening recommendations, and available genetic testing options.    Personal History:  Hafsa is a 62 year old female.  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 Hafsa reports she underwent salpingo oophorectomy of the right ovary in 2008 due to an abnormal ultrasound result that turned out to be benign.      She had her first menstrual period at age 10 and is postmenopausal. She did not have any children. Her most recent  results were normal. She reports about 6 years of oral contraceptive use in her late teens into her 20s and no hormone replacement therapy.      She has regular clinical breast exams and mammograms. Her most recent mammogram was on 9/15/2017 and results are pending. Her mammogram in 2014 was normal. She did undergo a breast biopsy back in 1985 at age 30 due to a cyst that was benign. She received colonoscopy on 11/23/2013 in which a tubular adenoma was found in the cecum. Follow up in 5 years was recommended. She does not regularly do any other cancer screening at this time.  Hafsa reported no tobacco use, and very occasional alcohol use.    Family History: (Please see scanned pedigree for detailed family history information) Per Hafsa, her family history is  significant for the following:    Hafsa's mother was diagnosed with breast cancer at age 63, which recurred at age 68. She was also diagnosed with colon cancer at age 82, which metastasized to the bones. She passed away at age 84.    Hafsa's father was diagnosed with pancreatic cancer and passed away at age 79.    Hafas's paternal uncle Jimbo was diagnosed with pancreatic cancer and passed away in his 30s.     Her maternal ethnicity is Ashkenazi Adventist. Her paternal ethnicity is Ashkenazi Adventist. There is no reported consanguinity.    Discussion:    Hafsa's personal and family history of multiple cancers as well as her ancestry is suggestive of a possible cancer susceptibility gene in the family. Given her diagnosis of ovarian cancer, we discussed Sullivan syndrome and Hereditary Breast and Ovarian Cancer syndrome as the two most likely hereditary explanations.    We reviewed the features of sporadic, familial, and hereditary cancers.  In looking at Hafsa's family history, it is possible that a cancer susceptibility gene is present due to her ancestry, personal diagnosis with ovarian cancer, and two close relatives with pancreatic cancer on the paternal side or breast cancer and colon cancer on the maternal side.    We discussed the natural history and genetics of Hereditary Breast and Ovarian cancer syndrome due to BRCA1 and BRCA2 gene mutations. We also discussed the natural history and genetics of Sullivan syndrome due to mismatch repair gene mutations (MLH1, MSH2, MSH6, PMS2, EPCAM).      A detailed handout regarding hereditary gynecologic cancer risk genes and the information we discussed was provided to Hafsa at the end of our appointment today and can be found in the after visit summary.  Topics included: inheritance pattern, cancer risks, cancer screening recommendations, and also risks, benefits and limitations of testing.    Based on her personal history, Hafsa meets current National Comprehensive Cancer Network  (NCCN) criteria for genetic testing of BRCA1 and BRCA2.    We discussed that there are three common mutations in the BRCA genes that are common in the Ashkenazi Mormonism population.  About 1 in 40 individuals of Ashkenazi Mormonism background have one of these three mutations, which account for about 90% of the BRCA mutations in this population.      Genetic testing of the BRCA genes in individuals of Ashkenazi Mormonism decent typically begins with testing for the three common mutations.  If these three mutations are not detected, there is additional testing that can be done.  We discussed the option of reflex testing to full sequencing and deletion/duplication analysis of the BRCA1 and BRCA2 genes.  Other options for testing include a multigene panel, such as OvaNext.    We discussed that there are additional genes that could cause increased risk for ovarian cancer.  As many of these genes present with overlapping features in a family and accurate cancer risk cannot always be established based upon the pedigree analysis alone, it would be reasonable for Hafsa to consider panel genetic testing to analyze multiple genes at once.    Hafsa expressed understanding and wanted to consider multigene panel.    Genetic testing is available for 25 genes associated with hereditary gynecologic cancers: OvaNext (COSTA, BARD1, BRCA1, BRCA2, BRIP1, CDH1, CHEK2, DICER1, EPCAM, MLH1, MRE11A, MSH2, MSH6, MUTYH, NBN, NF1, PALB2, PMS2, PTEN, RAD50, RAD51C, RAD51D, SMARCA4, STK11, and TP53).    We discussed that some of the genes in the OvaNext panel are associated with specific hereditary cancer syndromes and have published management guidelines: Hereditary Breast and Ovarian Cancer syndrome (BRCA1, BRCA2), Sullivan syndrome (MLH1, MSH2, MSH6, PMS2, EPCAM), Hereditary Diffuse Gastric Cancer (CDH1), Cowden syndrome (PTEN), Li Fraumeni syndrome (TP53), Peutz-Jeghers syndrome (STK11), MUTYH Associated Polyposis syndrome (MUTYH), and Neurofibromatosis  type 1 (NF1).      Risk-reducing salpingo-oophorectomy can be considered in women with mutations in BRIP1, RAD51C, or RAD51D.  Breast cancer risk management guidelines are available for COSTA, CHEK2, PALB2, NF1, and NBN.    The remaining genes (BARD1, DICER1, MRE11A, RAD50, and SMARCA4) are associated with increased cancer risk and may allow us to make medical recommendations when mutations are identified.      Consent was obtained and genetic testing for OvaNext was sent to [facility]. Turn around time: approximately 5 weeks.    Medical Management: For Hafsa, we reviewed that the information from genetic testing may determine:    additional cancer screening for which Hafsa may qualify (i.e. mammogram and breast MRI, more frequent colonoscopies, more frequent dermatologic exams, etc.),    and targeted chemotherapies for Hafsa's active cancer, or if she were to develop certain cancers in the future (i.e. immunotherapy for individuals with Sullivan syndrome, PARP inhibitors, etc.).     These recommendations and possible targeted chemotherapies will be discussed in detail once genetic testing is completed.       Plan:  1) Today Hafsa elected to proceed with OvaNext genetic test through [facility].  2) This information should be available in 4-6 weeks.  3) Hafsa will return to clinic to discuss the results    Face to face time: 50 minutes    Mariam Sams MS, [facility] [place]  Certified Genetic Counselor  T: [phone] / T: [phone]  F: [phone]

## 2017-10-06 NOTE — PATIENT INSTRUCTIONS
Assessing Cancer Risk  Only about 5-10% of cancers are thought to be due to an inherited cancer susceptibility gene.    These families often have:    Several people with the same or related types of cancer    Cancers diagnosed at a young age (before age 50)    Individuals with more than one primary cancer    Multiple generations of the family affected with cancer    Some people may be candidates for genetic testing of more than one gene.  For these families, genetic testing using a cancer panel may be offered.  These panels can test many genes at once known to increase the risk for gynecologic (and other) cancers:  BRCA1, BRCA2, BRIP1 MLH1, MSH2, MSH6, PMS2, EPCAM, PTEN, PALB2, RAD51C, RAD51D, and TP53. The purpose of this handout is to serve as a brief summary of the gynecologic cancer risk genes that have published clinical management guidelines for individuals who are found to carry a mutation.    ______________________________________________________________________________  Hereditary Breast and Ovarian Cancer Syndrome   (BRCA1 and BRCA2)  A single mutation in one of the copies of BRCA1 or BRCA2 increases the risk for breast and ovarian cancer, among others.  The risk for pancreatic cancer and melanoma may also be slightly increased in some families.  The chart below shows the chance that someone with a BRCA mutation would develop cancer in his or her lifetime1,2,3,4.        A person s ethnic background is also important to consider, as individuals of Ashkenazi [place] ancestry have a higher chance of having a BRCA gene mutation.  There are three BRCA mutations that occur more frequently in this population.    Sullivan Syndrome   (MLH1, MSH2, MSH6, PMS2, and EPCAM)  Currently five genes are known to cause Sullivan Syndrome: MLH1, MSH2, MSH6, PMS2, and EPCAM.  A single mutation in one of the Sullivan Syndrome genes increases the risk for colon, endometrial, ovarian, and stomach cancers.  Other cancers that occur  less commonly in Sullivan Syndrome include urinary tract, skin, and brain cancers.  The chart below shows the chance that a person with Sullivan syndrome would develop cancer in his or her lifetime7.      *Cancer risk varies depending on Sullivan syndrome gene found    Cowden Syndrome   (PTEN)  Cowden syndrome is a hereditary condition that increases the risk for breast, thyroid, endometrial, and kidney cancer.  Cowden syndrome is caused by a mutation in the PTEN gene.  A single mutation in one of the copies of PTEN causes Cowden syndrome and increases cancer risk.  The chart below shows the chance that someone with a PTEN mutation would develop cancer in their lifetime5,6.  Other benign features seen in some individuals with Cowden syndrome include benign skin lesions (facial papules, keratoses, lipomas), learning disability, autism, thyroid nodules, colon polyps, and larger head size.      *One recent study found breast cancer risk to be increased to 85%  Li-Fraumeni Syndrome   (TP53)  Li-Fraumeni Syndrome (LFS) is a cancer predisposition syndrome caused by a mutation in the TP53 gene. A single mutation in one of the copies of TP53 increases the risk for multiple cancers. Individuals with LFS are at an increased risk for developing cancer at a young age. The general lifetime risk for development of cancer is 50% by age 30 and 90% by age 60.     Core Cancers: Sarcomas, Breast, Brain, Lung, Leukemias/Lymphomas, Adrenocortical carcinomas  Other Cancers: Gastrointestinal, Thyroid, Skin, Genitourinary    Additional Genes  PALB2  Mutations in PALB2 have been shown to increase the risk of breast cancer up to 33-58% in some families; where individuals fall within this risk range is dependent upon family history. PALB2 mutations have also been associated with increased risk for pancreatic cancer, although this risk has not been quantified yet.  Individuals who inherit two PALB2 mutations--one from their mother and one from their  father--have a condition called Fanconi Anemia.  This rare autosomal recessive condition is associated with short stature, developmental delay, bone marrow failure, and increased risk for childhood cancers.    BRIP1, RAD51C and RAD51D  Mutations in BRIP1, RAD51C, and RAD51D have been shown to increase the risk of ovarian cancer as well as female breast cancer.    ______________________________________________________________________________    Inheritance  All of the cancer syndromes reviewed above are inherited in an autosomal dominant pattern.  This means that if a parent has a mutation, each of his or her children will have a 50% chance of inheriting that same mutation.  Therefore, each child--male or female--would have a 50% chance of being at increased risk for developing cancer.      Image obtained from Genetics Home Reference, 2013     Mutations in some genes can occur de asif, which means that a person s mutation occurred for the first time in them and was not inherited from a parent.  Now that they have the mutation, however, it can be passed on to future generations.    Genetic Testing  Genetic testing involves a blood test and will look for any harmful mutations that are associated with increased cancer risk.  If possible, it is recommended that the person(s) who has had cancer be tested before other family members.  That person will give us the most useful information about whether or not a specific gene is associated with the cancer in the family.    Results  There are three possible results of genetic testing:    Positive--a harmful mutation was identified in one or more of the genes    Negative--no mutation was identified in any of the 9 genes on this panel    Variant of unknown significance--a variation in one of the genes was identified, but it is unclear how this impacts cancer risk in the family    Advantages and Disadvantages   There are advantages and disadvantages to genetic  testing.  Advantages    May clarify your cancer risk    Can help you make medical decisions    May explain the cancers in your family    May give useful information to your family members (if you share your results)    Disadvantages    Possible negative emotional impact of learning about inherited cancer risk    Uncertainty in interpreting a negative test result in some situations    Possible genetic discrimination concerns (see below)    Genetic Information Nondiscrimination Act ([place])  [place] is a federal law that protects individuals from health insurance or employment discrimination based on a genetic test result alone.  Although rare, there are currently no legal discrimination protections in terms of life insurance, long term care, or disability insurances.  Visit the [employer] website to learn more.    Reducing Cancer Risk  Each of the genes listed within this handout have nationally recognized cancer screening guidelines that would be recommended for individuals who test positive.  In addition to increased cancer screening, surgeries may be offered or recommended to reduce cancer risk.  Recommendations are based upon an individual s genetic test result as well as their personal and family history of cancer.    Questions to Think About Regarding Genetic Testing:    What effect will the test result have on me and my relationship with my family members if I have an inherited gene mutation?  If I don t have a gene mutation?    Should I share my test results, and how will my family react to this news, which may also affect them?    Are my children ready to learn new information that may one day affect their own health?        Hereditary Cancer Resources    FORCE: Facing Our Risk of Cancer Empowered facingourrisk.org   Bright Pink bebrightpink.org   Li-Fraumeni Syndrome Association lfsassociation.org   PTEN World PTENworld.com   Collaborative Group of the Americas on Inherited  Colorectal Cancer (CGA) cgaEncompass [email or web address]   Cancer Care cancercare.org   American Cancer Society (ACS) cancer.org   National Cancer [place] (NCI) cancer.gov       Cancer Risk Management Program [phone]-CANCER ([phone])  ? Francesca Spence, MS, [facility]  [phone]  ? Olesya Barraza, MS, [facility]  [phone]  ? Dang Valera, MS, [facility]  [phone]  ? Raven Concepcion, MS, [facility]  [phone]   ? Mariam Sams, MS, [facility]  [phone]    References  1. Kody A, Radha PDP, Dhaval S, Sesar SHELLEY, Marshall JE, Johnny JL, Luz N, Geena H, Martita O, Nura A, Brynn B, Sandy P, Mantracy S, Mimi DM, Michael N, Yumiko E, Breanna H, Collin E, Page J, Yordan J, Terry B, Padmaja H, Thorlacius S, Eerola H, Nevmichaelna H, Thalia K, Spencer OP. Average risks of breast and ovarian cancer associated with BRCA1 or BRCA2 mutations detected in case series unselected for family history: a combined analysis of 222 studies. Am J Hum Brittany. 2003;72:1117-30.  2. Isac N, Reena M, Ren G.  BRCA1 and BRCA2 Hereditary Breast and Ovarian Cancer. Gene Reviews online. 2013.  3. Adam YC, Marquez S, Ellie G, Blackman S. Breast cancer risk among male BRCA1 and BRCA2 mutation carriers. J Natl Cancer Inst. 2007;99:1811-4.  4. Sage DG, Huber I, Martín J, Sohan E, Yvette ER, Paul F. Risk of breast cancer in male BRCA2 carriers. J Med Brittany. 2010;47:710-1.  5. Chris MH, Skip J, Ailyn J, Dulce LA, Rosalva MS, Eng C. Lifetime cancer risks in individuals with germline PTEN mutations. Clin Cancer Res. 2012;18:400-7.  6. Mikie KIM. Cowden Syndrome: A Critical Review of the Clinical Literature. J Brittany . 2009:18:13-27.  7. National Comprehensive Cancer Network. Clinical practice guidelines in oncology, colorectal cancer screening. Available online (registration required). 2015.  8. Kody SABA et al. Breast-Cancer Risk in Families with Mutations in PALB2. NEJM. 2014; 371(6):497-506.

## 2017-10-06 NOTE — MR AVS SNAPSHOT
After Visit Summary   10/6/2017    Hafsa Corona    MRN: [id number]           Patient Information     Date Of Birth          1955        Visit Information        Provider Department      10/6/2017 12:00 PM Mariam Sams GC;  2 [address] CONSULT [facility]        Care Instructions          Assessing Cancer Risk  Only about 5-10% of cancers are thought to be due to an inherited cancer susceptibility gene.    These families often have:    Several people with the same or related types of cancer    Cancers diagnosed at a young age (before age 50)    Individuals with more than one primary cancer    Multiple generations of the family affected with cancer    Some people may be candidates for genetic testing of more than one gene.  For these families, genetic testing using a cancer panel may be offered.  These panels can test many genes at once known to increase the risk for gynecologic (and other) cancers:  BRCA1, BRCA2, BRIP1 MLH1, MSH2, MSH6, PMS2, EPCAM, PTEN, PALB2, RAD51C, RAD51D, and TP53. The purpose of this handout is to serve as a brief summary of the gynecologic cancer risk genes that have published clinical management guidelines for individuals who are found to carry a mutation.    ______________________________________________________________________________  Hereditary Breast and Ovarian Cancer Syndrome   (BRCA1 and BRCA2)  A single mutation in one of the copies of BRCA1 or BRCA2 increases the risk for breast and ovarian cancer, among others.  The risk for pancreatic cancer and melanoma may also be slightly increased in some families.  The chart below shows the chance that someone with a BRCA mutation would develop cancer in his or her lifetime1,2,3,4.        A person s ethnic background is also important to consider, as individuals of Ashkenazi [place] ancestry have a higher chance of having a BRCA gene mutation.  There are three BRCA mutations that occur more frequently in  this population.    Sullivan Syndrome   (MLH1, MSH2, MSH6, PMS2, and EPCAM)  Currently five genes are known to cause Sullivan Syndrome: MLH1, MSH2, MSH6, PMS2, and EPCAM.  A single mutation in one of the Sullivan Syndrome genes increases the risk for colon, endometrial, ovarian, and stomach cancers.  Other cancers that occur less commonly in Sullivan Syndrome include urinary tract, skin, and brain cancers.  The chart below shows the chance that a person with Sullivan syndrome would develop cancer in his or her lifetime7.      *Cancer risk varies depending on Sullivan syndrome gene found    Cowden Syndrome   (PTEN)  Cowden syndrome is a hereditary condition that increases the risk for breast, thyroid, endometrial, and kidney cancer.  Cowden syndrome is caused by a mutation in the PTEN gene.  A single mutation in one of the copies of PTEN causes Cowden syndrome and increases cancer risk.  The chart below shows the chance that someone with a PTEN mutation would develop cancer in their lifetime5,6.  Other benign features seen in some individuals with Cowden syndrome include benign skin lesions (facial papules, keratoses, lipomas), learning disability, autism, thyroid nodules, colon polyps, and larger head size.      *One recent study found breast cancer risk to be increased to 85%  Li-Fraumeni Syndrome   (TP53)  Li-Fraumeni Syndrome (LFS) is a cancer predisposition syndrome caused by a mutation in the TP53 gene. A single mutation in one of the copies of TP53 increases the risk for multiple cancers. Individuals with LFS are at an increased risk for developing cancer at a young age. The general lifetime risk for development of cancer is 50% by age 30 and 90% by age 60.     Core Cancers: Sarcomas, Breast, Brain, Lung, Leukemias/Lymphomas, Adrenocortical carcinomas  Other Cancers: Gastrointestinal, Thyroid, Skin, Genitourinary    Additional Genes  PALB2  Mutations in PALB2 have been shown to increase the risk of breast cancer up to 33-58% in  some families; where individuals fall within this risk range is dependent upon family history. PALB2 mutations have also been associated with increased risk for pancreatic cancer, although this risk has not been quantified yet.  Individuals who inherit two PALB2 mutations--one from their mother and one from their father--have a condition called Fanconi Anemia.  This rare autosomal recessive condition is associated with short stature, developmental delay, bone marrow failure, and increased risk for childhood cancers.    BRIP1, RAD51C and RAD51D  Mutations in BRIP1, RAD51C, and RAD51D have been shown to increase the risk of ovarian cancer as well as female breast cancer.    ______________________________________________________________________________    Inheritance  All of the cancer syndromes reviewed above are inherited in an autosomal dominant pattern.  This means that if a parent has a mutation, each of his or her children will have a 50% chance of inheriting that same mutation.  Therefore, each child--male or female--would have a 50% chance of being at increased risk for developing cancer.      Image obtained from Genetics Home Reference, 2013     Mutations in some genes can occur de asif, which means that a person s mutation occurred for the first time in them and was not inherited from a parent.  Now that they have the mutation, however, it can be passed on to future generations.    Genetic Testing  Genetic testing involves a blood test and will look for any harmful mutations that are associated with increased cancer risk.  If possible, it is recommended that the person(s) who has had cancer be tested before other family members.  That person will give us the most useful information about whether or not a specific gene is associated with the cancer in the family.    Results  There are three possible results of genetic testing:    Positive--a harmful mutation was identified in one or more of the  genes    Negative--no mutation was identified in any of the 9 genes on this panel    Variant of unknown significance--a variation in one of the genes was identified, but it is unclear how this impacts cancer risk in the family    Advantages and Disadvantages   There are advantages and disadvantages to genetic testing.  Advantages    May clarify your cancer risk    Can help you make medical decisions    May explain the cancers in your family    May give useful information to your family members (if you share your results)    Disadvantages    Possible negative emotional impact of learning about inherited cancer risk    Uncertainty in interpreting a negative test result in some situations    Possible genetic discrimination concerns (see below)    Genetic Information Nondiscrimination Act ([place])  [place] is a federal law that protects individuals from health insurance or employment discrimination based on a genetic test result alone.  Although rare, there are currently no legal discrimination protections in terms of life insurance, long term care, or disability insurances.  Visit the National Human Genome Research [employer] website to learn more.    Reducing Cancer Risk  Each of the genes listed within this handout have nationally recognized cancer screening guidelines that would be recommended for individuals who test positive.  In addition to increased cancer screening, surgeries may be offered or recommended to reduce cancer risk.  Recommendations are based upon an individual s genetic test result as well as their personal and family history of cancer.    Questions to Think About Regarding Genetic Testing:    What effect will the test result have on me and my relationship with my family members if I have an inherited gene mutation?  If I don t have a gene mutation?    Should I share my test results, and how will my family react to this news, which may also affect them?    Are my children ready to learn new information  that may one day affect their own health?        Hereditary Cancer Resources    FORCE: Facing Our Risk of Cancer Empowered facingourrisk.org   Bright Pink bebrightpink.org   Li-Fraumeni Syndrome Association lfsassociation.org   PTEN World PTENworld.com   Collaborative Group of the Americas on Inherited Colorectal Cancer (CGA) cgaicc.com http://www.facingourrisk.org/   Cancer Care cancercare.org   American Cancer Society (ACS) cancer.org   National Cancer Sandy (NCI) cancer.gov       Cancer Risk Management Program [phone]-[facility] ([phone])  ? Francesca Spence, MS, [facility]  [phone]  ? Olesya Christi, MS, [facility]  [phone]  ? Dang Valera, MS, [facility]  [phone]  ? Raven Concepcion, MS, [facility]  [phone]   ? Mariam Sams, MS, [facility]  [phone]    References  1. Kody MELLO, Radha PDP, Dhaval S, Sesar SHELLEY, Marshall JE, Johnny JL, Luz N, Geena H, Martita O, Nura A, Pasini B, Radizahra P, Manbethkishila S, Mimi DM, Michael N, Yumiko E, Breanna H, Collin E, Page J, Gromartín J, Terry B, Padmaja H, Thorlacius S, Eerola H, Nevkathryn H, Thalia K, Spencer OP. Average risks of breast and ovarian cancer associated with BRCA1 or BRCA2 mutations detected in case series unselected for family history: a combined analysis of 222 studies. Am J Hum Brittany. 2003;72:1117-30.  2. Isac N, Reena M, Ren G.  BRCA1 and BRCA2 Hereditary Breast and Ovarian Cancer. Gene Reviews online. 2013.  3. Adam YC, Marquez S, Ellie G, Blackman S. Breast cancer risk among male BRCA1 and BRCA2 mutation carriers. J Natl Cancer Inst. 2007;99:1811-4.  4. Saeg PERALES, Huber I, Martín J, Sohan E, Yvette ER, Paul F. Risk of breast cancer in male BRCA2 carriers. J Med Brittany. 2010;47:710-1.  5. Chris MH, Skip J, Ailyn J, Dulce LA, Rosalva MS, Jorge C. Lifetime cancer risks in individuals with germline PTEN mutations. Clin Cancer Res. 2012;18:400-7.  6. Mikie KIM. Cowden Syndrome: A Critical Review of the Clinical Literature. J Brittany .  2009:18:13-27.  7. National Comprehensive Cancer Network. Clinical practice guidelines in oncology, colorectal cancer screening. Available online (registration required). 2015.  8. Kody SABA et al. Breast-Cancer Risk in Families with Mutations in PALB2. NEJM. 2014; 371(6):497-506.                Follow-ups after your visit        Your next 10 appointments already scheduled     Oct 16, 2017  7:45 AM CDT   [facility] Draw with [facility] LAB DRAW   [facility] ([facility])    [address]  2nd [facility] [address]   [phone]            Oct 16, 2017  8:20 AM CDT   (Arrive by 8:05 AM)   Return Active Treatment with MARLON Ogden CNP   [facility] ([facility])    [address] [place] [address]  2nd [facility] [address]   [phone]            Oct 19, 2017 12:00 PM CDT   (Arrive by 11:45 AM)   RETURN WITH ROOM with Lola Vasquez, PhD LP,  [address] CONSULT [facility] ([facility])    [address]  [address] [facility] [address]   [phone]            Oct 23, 2017  3:00 PM CDT   New Patient Visit with Lola Vasquez, PhD LP   [facility]  ([facility])    [address]  [address] [address]   [phone]            Nov 03, 2017 11:20 AM CDT   (Arrive by 11:05 AM)   CT CHEST/ABDOMEN/PELVIS W CONTRAST with UCCT2   [facility] [place] [facility] ([facility])    [address]  1st Floor  [facility] [address] [phone]           Please bring any scans or X-rays taken at other hospitals, if similar tests were done. Also bring a list of your medicines, including vitamins, minerals and over-the-counter drugs. It is safest to leave personal items at home.  Be sure to tell your doctor:   If you have any allergies.   If there s  any chance you are pregnant.   If you are breastfeeding.   If you have any special needs.  You may have contrast for this exam. To prepare:   Do not eat or drink for 2 hours before your exam. If you need to take medicine, you may take it with small sips of water. (We may ask you to take liquid medicine as well.)   The day before your exam, drink extra fluids at least six 8-ounce glasses (unless your doctor tells you to restrict your fluids).  Patients over 70 or patients with diabetes or kidney problems:   If you haven t had a blood test (creatinine test) within the last 30 days, go to your clinic or Diagnostic Imaging Department for this test.  If you have diabetes:   If your kidney function is normal, continue taking your metformin (Avandamet, Glucophage, Glucovance, Metaglip) on the day of your exam.   If your kidney function is abnormal, wait 48 hours before restarting this medicine.  You will have oral contrast for this exam:   You will drink the contrast at home. Get this from your clinic or Diagnostic Imaging Department. Please follow the directions given.  Please wear loose clothing, such as a sweat suit or jogging clothes. Avoid snaps, zippers and other metal. We may ask you to undress and put on a hospital gown.  If you have any questions, please call the Imaging Department where you will have your exam.            Nov 06, 2017  4:40 PM CST   (Arrive by 4:25 PM)   Return Visit with Jarod Shaffer MD   [facility] ([facility] [place])    [address]  2nd Floor  [facility] [id number] [phone]            Nov 14, 2017 10:00 AM CST   (Arrive by 9:45 AM)   CT CHEST W CONTRAST with UCCT1   [facility] ([facility])    [address]  1st Floor  [facility] [id number] [phone]           Please bring any scans or X-rays taken at other hospitals, if similar tests were done. Also bring a list of your  medicines, including vitamins, minerals and over-the-counter drugs. It is safest to leave personal items at home.  Be sure to tell your doctor:   If you have any allergies.   If there s any chance you are pregnant.   If you are breastfeeding.   If you have any special needs.  You will have contrast for this exam. To prepare:   Do not eat or drink for 2 hours before your exam. If you need to take medicine, you may take it with small sips of water. (We may ask you to take liquid medicine as well.)   The day before your exam, drink extra fluids at least six 8-ounce glasses (unless your doctor tells you to restrict your fluids).  Patients over 70 or patients with diabetes or kidney problems:   If you haven t had a blood test (creatinine test) within the last 30 days, go to your clinic or Diagnostic Imaging Department for this test.  If you have diabetes:   If your kidney function is normal, continue taking your metformin (Avandamet, Glucophage, Glucovance, Metaglip) on the day of your exam.   If your kidney function is abnormal, wait 48 hours before restarting this medicine.  Please wear loose clothing, such as a sweat suit or jogging clothes. Avoid snaps, zippers and other metal. We may ask you to undress and put on a hospital gown.  If you have any questions, please call the Imaging Department where you will have your exam.              Who to contact     If you have questions or need follow up information about today's clinic visit or your schedule please contact [facility] directly at [phone].  Normal or non-critical lab and imaging results will be communicated to you by MyChart, letter or phone within 4 business days after the clinic has received the results. If you do not hear from us within 7 days, please contact the clinic through [employer] or phone. If you have a critical or abnormal lab result, we will notify you by phone as soon as possible.  Submit refill requests through [employer] or  "call your pharmacy and they will forward the refill request to us. Please allow 3 business days for your refill to be completed.          Additional Information About Your Visit        MyChart Information     [employer] lets you send messages to your doctor, view your test results, renew your prescriptions, schedule appointments and more. To sign up, go to [email or web address] . Click on \"Log in\" on the left side of the screen, which will take you to the Welcome page. Then click on \"Sign up Now\" on the right side of the page.     You will be asked to enter the access code listed below, as well as some personal information. Please follow the directions to create your username and password.     Your access code is: [email or web address]  Expires: 10/10/2017 11:16 AM     Your access code will  in 90 days. If you need help or a new code, please call your [facility] clinic or [phone].        Care EveryWhere ID     This is your Care EveryWhere ID. This could be used by other organizations to access your [facility] medical records  [id number]        Your Vitals Were     Last Period                   2009            Blood Pressure from Last 3 Encounters:   17 118/83   17 123/77   17 120/76    Weight from Last 3 Encounters:   17 83 kg (182 lb 14.4 oz)   17 82.2 kg (181 lb 3.2 oz)   17 82.6 kg (182 lb)              Today, you had the following     No orders found for display         Today's Medication Changes          These changes are accurate as of: 10/6/17  1:21 PM.  If you have any questions, ask your nurse or doctor.               These medicines have changed or have updated prescriptions.        Dose/Directions    metFORMIN 500 MG 24 hr tablet   Commonly known as:  GLUCOPHAGE-XR   This may have changed:    - how much to take  - when to take this  - additional instructions   Used for:  Type 2 diabetes mellitus without complication, without long-term current use of insulin (H)     "    Take two tabs by mouth once daily with evening meal.   Quantity:  180 tablet   Refills:  PRN                Primary Care Provider Office Phone # Fax #    [place] HERRERA ATTILA Ayon [phone] [address] [place]  [facility] [address]        Equal Access to Services     RONEY ARANDA : Hadii aad ku hadasho Soomaali, waaxda luqadaha, qaybta kaalmada adeegyada, waxay idiin hayaan adewood khgeorgieken larell . So [facility] [phone].    ATENCIÓN: Si habla español, tiene a martínez disposición servicios gratuitos de asistencia lingüística. [facility] [phone].    We comply with applicable federal civil rights laws and [place] laws. We do not discriminate on the basis of race, color, national origin, age, disability, sex, sexual orientation, or gender identity.            Thank you!     Thank you for choosing [facility]  for your care. Our goal is always to provide you with excellent care. Hearing back from our patients is one way we can continue to improve our services. Please take a few minutes to complete the written survey that you may receive in the mail after your visit with us. Thank you!             Your Updated Medication List - Protect others around you: Learn how to safely use, store and throw away your medicines at www.disposemymeds.org.          This list is accurate as of: 10/6/17  1:21 PM.  Always use your most recent med list.                   Brand Name Dispense Instructions for use Diagnosis    acetaminophen 325 MG tablet    TYLENOL    100 tablet    Take 2 tablets (650 mg) by mouth every 6 hours as needed for mild pain    Cellulitis and abscess of leg, Mass of pelvis       atorvastatin 80 MG tablet    LIPITOR    90 tablet    Take 1 tablet (80 mg) by mouth daily    Hyperlipidemia LDL goal <100       CALCIUM-MAGNESIUM-VITAMIN D PO      Take 2 tablets by mouth daily        dexamethasone 4 MG tablet    DECADRON    2 tablet    Take 2 tablets (8 mg) by mouth every morning    Ovarian  cancer, left (H), Encounter for long-term (current) use of medications       EPINEPHrine 0.3 MG/0.3ML injection 2-pack    EPIPEN/ADRENACLICK/or ANY BX GENERIC EQUIV    1 each    Inject 0.3 mLs (0.3 mg) into the muscle once as needed for anaphylaxis    Bee allergy status       escitalopram 20 MG tablet    LEXAPRO    30 tablet    Take 1 tablet (20 mg) by mouth daily    Major depressive disorder, recurrent episode, mild (H)       fluticasone 50 MCG/ACT spray    FLONASE    48 g    Spray 2 sprays into both nostrils daily    Chronic maxillary sinusitis       gabapentin 100 MG capsule    NEURONTIN    21 capsule    Take 1 capsule (100 mg) by mouth 3 times daily Start the day after chemo for one week    Pain in joint, multiple sites       lisinopril 10 MG tablet    PRINIVIL/ZESTRIL    90 tablet    Take 1 tablet (10 mg) by mouth daily    Essential hypertension       loratadine 10 MG tablet    CLARITIN    30 tablet    Take 1 tablet (10 mg) by mouth daily    Pain in joint, multiple sites       LORazepam 1 MG tablet    ATIVAN    30 tablet    Take 1 tablet (1 mg) by mouth every 6 hours as needed (nausea/vomiting, anxiety or sleep)    Ovarian cancer, left (H), Encounter for long-term (current) use of medications       metFORMIN 500 MG 24 hr tablet    GLUCOPHAGE-XR    180 tablet    Take two tabs by mouth once daily with evening meal.    Type 2 diabetes mellitus without complication, without long-term current use of insulin (H)       MULTIVITAMIN ADULT PO      Take 1 tablet by mouth daily        order for DME     1 Units    Equipment being ordered: blood pressure monitor    Essential hypertension       * order for DME     1 each    Compression stockings 20-30 mm Hg. To be wear when directed by Hematology team and while awake for the first two years post clot.    Long-term (current) use of anticoagulants, Acute deep vein thrombosis (DVT) of left lower extremity, unspecified vein (H)       * order for DME     1 Bottle    Plain packing  "strip 1/4\"    Abscess of leg       * order for DME     1 Box    Sterile zoey tipped applicators    Abscess of leg       * order for DME     3 Bottle    USE 1/4 INCH  WIDTH PLAIN NU GAUZE PACKING TO DO SELF WOUND CARE OF LEFT LOWER LEG ONCE DAILY. DIMENSIONS OF WOUND PRESENTLY ARE 2 X 2 INCH AND APPROXIMATELY 1/2 INCH DEEP.  USES 6 INCHES OF PACKING CURRENTLY FOR DRESSING CHANGE.  FAX TO [facility] [phone]    Wound of left leg       * order for DME     30 pad    STERILE ADHESIVE PAD BANDAGE AT LEAST 4X4 INCH IN SIZE NEEDED FOR DAILY WOUND CARE TO LEFT LOWER LEG. FAX TO [facility] [phone]    Leg wound, left       polyethylene glycol Packet    MIRALAX/GLYCOLAX    7 packet    Take 17 g by mouth daily    Mass of pelvis       PRO-BIOTIC BLEND PO      Take 1 capsule by mouth daily Enzymatic Therapy-probiotic pearls        prochlorperazine 10 MG tablet    COMPAZINE    30 tablet    Take 1 tablet (10 mg) by mouth every 6 hours as needed (nausea/vomiting)    Ovarian cancer, left (H), Encounter for long-term (current) use of medications       rivaroxaban ANTICOAGULANT 20 MG Tabs tablet    XARELTO    30 tablet    Take 1 tablet (20 mg) by mouth daily (with dinner)    Long-term (current) use of anticoagulants, Acute deep vein thrombosis (DVT) of left lower extremity, unspecified vein (H)       senna-docusate 8.6-50 MG per tablet    SENOKOT-S;PERICOLACE    100 tablet    Take 2 tablets by mouth 2 times daily Start with 1 tablet PO BID, If no bowel movement in 24 hours, increase to 2 tablets PO BID.  Hold for loose stools.    Mass of pelvis       traZODone 50 MG tablet    DESYREL    180 tablet    TAKE 1 TO 2 TABLETS(50  MG) BY MOUTH EVERY NIGHT AS NEEDED FOR SLEEP    Insomnia, unspecified type       vitamin B complex with vitamin C Tabs tablet      Take 1 tablet by mouth daily        vitamin D 1000 UNITS capsule      Take 2,000 Units by mouth daily        VYVANSE PO      Take 50 mg by mouth daily        * " Notice:  This list has 5 medication(s) that are the same as other medications prescribed for you. Read the directions carefully, and ask your doctor or other care provider to review them with you.

## 2017-10-10 DIAGNOSIS — C56.2 OVARIAN CANCER, LEFT (H): ICD-10-CM

## 2017-10-10 DIAGNOSIS — Z79.899 ENCOUNTER FOR LONG-TERM (CURRENT) USE OF MEDICATIONS: ICD-10-CM

## 2017-10-11 RX ORDER — DEXAMETHASONE 4 MG/1
TABLET ORAL
Qty: 10 TABLET | Refills: 2 | Status: SHIPPED | OUTPATIENT
Start: 2017-10-11 | End: 2017-11-01

## 2017-10-13 ENCOUNTER — CARE COORDINATION (OUTPATIENT)
Dept: ONCOLOGY | Facility: CLINIC | Age: 62
End: 2017-10-13

## 2017-10-13 NOTE — PROGRESS NOTES
Care Coordinator Note  Called and left a message about taking her Dexamethasone  20 mg 12 and 6 hours prior to chemotherapy.       Emely CORONEL RN, OCN  Care Coordinator   Gynecologic Cancer   Office [phone]  Pager [phone] #[phone]

## 2017-10-16 ENCOUNTER — CARE COORDINATION (OUTPATIENT)
Dept: ONCOLOGY | Facility: CLINIC | Age: 62
End: 2017-10-16

## 2017-10-16 ENCOUNTER — ONCOLOGY VISIT (OUTPATIENT)
Dept: ONCOLOGY | Facility: CLINIC | Age: 62
End: 2017-10-16
Attending: NURSE PRACTITIONER
Payer: COMMERCIAL

## 2017-10-16 VITALS
TEMPERATURE: 98.2 F | RESPIRATION RATE: 18 BRPM | WEIGHT: 185.7 LBS | OXYGEN SATURATION: 95 % | DIASTOLIC BLOOD PRESSURE: 74 MMHG | HEIGHT: 63 IN | SYSTOLIC BLOOD PRESSURE: 111 MMHG | HEART RATE: 104 BPM | BODY MASS INDEX: 32.9 KG/M2

## 2017-10-16 DIAGNOSIS — D70.1 CHEMOTHERAPY-INDUCED NEUTROPENIA (H): Primary | ICD-10-CM

## 2017-10-16 DIAGNOSIS — T45.1X5A CHEMOTHERAPY-INDUCED NEUROPATHY (H): ICD-10-CM

## 2017-10-16 DIAGNOSIS — C56.2 OVARIAN CANCER, LEFT (H): ICD-10-CM

## 2017-10-16 DIAGNOSIS — R60.0 LEG EDEMA, LEFT: ICD-10-CM

## 2017-10-16 DIAGNOSIS — T45.1X5A CHEMOTHERAPY-INDUCED NEUTROPENIA (H): Primary | ICD-10-CM

## 2017-10-16 DIAGNOSIS — B37.0 THRUSH: ICD-10-CM

## 2017-10-16 DIAGNOSIS — M25.50 PAIN IN JOINT, MULTIPLE SITES: ICD-10-CM

## 2017-10-16 DIAGNOSIS — Z79.899 ENCOUNTER FOR LONG-TERM (CURRENT) USE OF MEDICATIONS: ICD-10-CM

## 2017-10-16 DIAGNOSIS — C56.2 OVARIAN CANCER, LEFT (H): Primary | ICD-10-CM

## 2017-10-16 DIAGNOSIS — C56.1 MALIGNANT NEOPLASM OF RIGHT OVARY (H): ICD-10-CM

## 2017-10-16 DIAGNOSIS — G62.0 CHEMOTHERAPY-INDUCED NEUROPATHY (H): ICD-10-CM

## 2017-10-16 LAB
ALBUMIN SERPL-MCNC: 3.4 G/DL (ref 3.4–5)
ALP SERPL-CCNC: 103 U/L (ref 40–150)
ALT SERPL W P-5'-P-CCNC: 23 U/L (ref 0–50)
ANION GAP SERPL CALCULATED.3IONS-SCNC: 7 MMOL/L (ref 3–14)
AST SERPL W P-5'-P-CCNC: 19 U/L (ref 0–45)
BASOPHILS # BLD AUTO: 0 10E9/L (ref 0–0.2)
BASOPHILS NFR BLD AUTO: 0.2 %
BILIRUB SERPL-MCNC: 0.3 MG/DL (ref 0.2–1.3)
BUN SERPL-MCNC: 24 MG/DL (ref 7–30)
CALCIUM SERPL-MCNC: 8.9 MG/DL (ref 8.5–10.1)
CANCER AG125 SERPL-ACNC: 10 U/ML (ref 0–30)
CHLORIDE SERPL-SCNC: 106 MMOL/L (ref 94–109)
CO2 SERPL-SCNC: 25 MMOL/L (ref 20–32)
CREAT SERPL-MCNC: 0.67 MG/DL (ref 0.52–1.04)
DIFFERENTIAL METHOD BLD: ABNORMAL
EOSINOPHIL # BLD AUTO: 0 10E9/L (ref 0–0.7)
EOSINOPHIL NFR BLD AUTO: 0 %
ERYTHROCYTE [DISTWIDTH] IN BLOOD BY AUTOMATED COUNT: 17.9 % (ref 10–15)
GFR SERPL CREATININE-BSD FRML MDRD: 89 ML/MIN/1.7M2
GLUCOSE SERPL-MCNC: 171 MG/DL (ref 70–99)
HCT VFR BLD AUTO: 31.1 % (ref 35–47)
HGB BLD-MCNC: 9.7 G/DL (ref 11.7–15.7)
IMM GRANULOCYTES # BLD: 0.1 10E9/L (ref 0–0.4)
IMM GRANULOCYTES NFR BLD: 1.3 %
LYMPHOCYTES # BLD AUTO: 1.1 10E9/L (ref 0.8–5.3)
LYMPHOCYTES NFR BLD AUTO: 13 %
MCH RBC QN AUTO: 27.8 PG (ref 26.5–33)
MCHC RBC AUTO-ENTMCNC: 31.2 G/DL (ref 31.5–36.5)
MCV RBC AUTO: 89 FL (ref 78–100)
MONOCYTES # BLD AUTO: 0.1 10E9/L (ref 0–1.3)
MONOCYTES NFR BLD AUTO: 1.1 %
NEUTROPHILS # BLD AUTO: 7.2 10E9/L (ref 1.6–8.3)
NEUTROPHILS NFR BLD AUTO: 84.4 %
NRBC # BLD AUTO: 0 10*3/UL
NRBC BLD AUTO-RTO: 0 /100
PLATELET # BLD AUTO: 313 10E9/L (ref 150–450)
POTASSIUM SERPL-SCNC: 4.1 MMOL/L (ref 3.4–5.3)
PROT SERPL-MCNC: 7.1 G/DL (ref 6.8–8.8)
RBC # BLD AUTO: 3.49 10E12/L (ref 3.8–5.2)
SODIUM SERPL-SCNC: 138 MMOL/L (ref 133–144)
WBC # BLD AUTO: 8.5 10E9/L (ref 4–11)

## 2017-10-16 PROCEDURE — 25000128 H RX IP 250 OP 636: Mod: ZF | Performed by: NURSE PRACTITIONER

## 2017-10-16 PROCEDURE — 99212 OFFICE O/P EST SF 10 MIN: CPT | Mod: ZF

## 2017-10-16 PROCEDURE — 80053 COMPREHEN METABOLIC PANEL: CPT | Performed by: NURSE PRACTITIONER

## 2017-10-16 PROCEDURE — 85025 COMPLETE CBC W/AUTO DIFF WBC: CPT | Performed by: NURSE PRACTITIONER

## 2017-10-16 PROCEDURE — 86304 IMMUNOASSAY TUMOR CA 125: CPT | Performed by: NURSE PRACTITIONER

## 2017-10-16 PROCEDURE — 99214 OFFICE O/P EST MOD 30 MIN: CPT | Mod: ZP | Performed by: NURSE PRACTITIONER

## 2017-10-16 PROCEDURE — 36591 DRAW BLOOD OFF VENOUS DEVICE: CPT

## 2017-10-16 RX ORDER — DEXAMETHASONE 4 MG/1
8 TABLET ORAL 2 TIMES DAILY WITH MEALS
Qty: 12 TABLET | Refills: 0 | Status: SHIPPED | OUTPATIENT
Start: 2017-10-16 | End: 2017-10-19

## 2017-10-16 RX ORDER — LORAZEPAM 2 MG/ML
1 INJECTION INTRAMUSCULAR EVERY 6 HOURS PRN
Status: CANCELLED
Start: 2017-10-18

## 2017-10-16 RX ORDER — SODIUM CHLORIDE 9 MG/ML
1000 INJECTION, SOLUTION INTRAVENOUS CONTINUOUS PRN
Status: CANCELLED
Start: 2017-10-18

## 2017-10-16 RX ORDER — MEPERIDINE HYDROCHLORIDE 25 MG/ML
25 INJECTION INTRAMUSCULAR; INTRAVENOUS; SUBCUTANEOUS EVERY 30 MIN PRN
Status: CANCELLED | OUTPATIENT
Start: 2017-10-18

## 2017-10-16 RX ORDER — PALONOSETRON 0.05 MG/ML
0.25 INJECTION, SOLUTION INTRAVENOUS ONCE
Status: CANCELLED
Start: 2017-10-18 | End: 2017-10-18

## 2017-10-16 RX ORDER — DIPHENHYDRAMINE HYDROCHLORIDE 50 MG/ML
50 INJECTION INTRAMUSCULAR; INTRAVENOUS
Status: CANCELLED
Start: 2017-10-18

## 2017-10-16 RX ORDER — ALBUTEROL SULFATE 90 UG/1
1-2 AEROSOL, METERED RESPIRATORY (INHALATION)
Status: CANCELLED
Start: 2017-10-18

## 2017-10-16 RX ORDER — ALBUTEROL SULFATE 0.83 MG/ML
2.5 SOLUTION RESPIRATORY (INHALATION)
Status: CANCELLED | OUTPATIENT
Start: 2017-10-18

## 2017-10-16 RX ORDER — NYSTATIN 100000/ML
500000 SUSPENSION, ORAL (FINAL DOSE FORM) ORAL 4 TIMES DAILY
Qty: 60 ML | Refills: 0 | Status: SHIPPED | OUTPATIENT
Start: 2017-10-16 | End: 2017-10-18

## 2017-10-16 RX ORDER — METHYLPREDNISOLONE SODIUM SUCCINATE 125 MG/2ML
125 INJECTION, POWDER, LYOPHILIZED, FOR SOLUTION INTRAMUSCULAR; INTRAVENOUS
Status: CANCELLED
Start: 2017-10-18

## 2017-10-16 RX ORDER — HEPARIN SODIUM (PORCINE) LOCK FLUSH IV SOLN 100 UNIT/ML 100 UNIT/ML
5 SOLUTION INTRAVENOUS DAILY PRN
Status: DISCONTINUED | OUTPATIENT
Start: 2017-10-16 | End: 2017-10-16 | Stop reason: HOSPADM

## 2017-10-16 RX ORDER — EPINEPHRINE 0.3 MG/.3ML
0.3 INJECTION SUBCUTANEOUS EVERY 5 MIN PRN
Status: CANCELLED | OUTPATIENT
Start: 2017-10-18

## 2017-10-16 RX ORDER — GABAPENTIN 100 MG/1
100-200 CAPSULE ORAL 3 TIMES DAILY
Qty: 42 CAPSULE | Refills: 1 | Status: SHIPPED | OUTPATIENT
Start: 2017-10-16 | End: 2018-02-26

## 2017-10-16 RX ORDER — EPINEPHRINE 1 MG/ML
0.3 INJECTION, SOLUTION, CONCENTRATE INTRAVENOUS EVERY 5 MIN PRN
Status: CANCELLED | OUTPATIENT
Start: 2017-10-18

## 2017-10-16 RX ADMIN — SODIUM CHLORIDE, PRESERVATIVE FREE 5 ML: 5 INJECTION INTRAVENOUS at 08:37

## 2017-10-16 ASSESSMENT — PAIN SCALES - GENERAL: PAINLEVEL: NO PAIN (0)

## 2017-10-16 NOTE — MR AVS SNAPSHOT
After Visit Summary   10/16/2017    Hafsa Corona    MRN: [id number]           Patient Information     Date Of Birth          1955        Visit Information        Provider Department      10/16/2017 8:20 AM Augustina Noriega APRN CNP [facility]        Today's Diagnoses     Ovarian cancer, left (H)    -  1    Malignant neoplasm of right ovary (H)        Encounter for long-term (current) use of medications        Pain in joint, multiple sites        Thrush        Leg edema, left           Follow-ups after your visit        Your next 10 appointments already scheduled     Oct 16, 2017  1:30 PM CDT   US LOWER EXTREMITY VENOUS DUPLEX BILATERAL with UCUSV1   [facility] [place] [facility] ([facility])    [address] [place] [address]  1st [facility] [address] [phone]           Please bring a list of your medicines (including vitamins, minerals and over-the-counter drugs). Also, tell your doctor about any allergies you may have. Wear comfortable clothes and leave your valuables at home.  You do not need to do anything special to prepare for your exam.  Please call the Imaging Department at your exam site with any questions.            Oct 18, 2017  7:00 AM CDT   Infusion 240 with  ONCOLOGY INFUSION, UC 27 ATC   [facility] ([facility])    [address] [place] [address]  2nd [facility] [address] [phone]            Oct 19, 2017 12:00 PM CDT   (Arrive by 11:45 AM)   RETURN WITH ROOM with Lola Vasquez, PhD LP,  2 118 CONSULT RM   [facility] ([facility])    [address] [place] [address]  2nd [facility] [address] [phone]            Oct 23, 2017  3:00 PM CDT   New Patient Visit with Lola Vasquez, PhD BENJAMIN   [facility]  ([facility])    [facility]  3rd Flr, [address]  [address]  [facility] [address]    [phone]            Nov 03, 2017  1:15 PM CDT   (Arrive by 1:00 PM)   RETURN WITH ROOM with Mariam Sams GC,  2 116 CONSULT RM   [facility] ([facility])    [address]  2nd Floor  [facility] [address] [phone]            Nov 14, 2017 10:00 AM CST   (Arrive by 9:45 AM)   CT CHEST/ABDOMEN/PELVIS W CONTRAST with UCCT1   [facility] ([facility])    [address]  1st Floor  [facility] [id number] [phone]           Please bring any scans or X-rays taken at other hospitals, if similar tests were done. Also bring a list of your medicines, including vitamins, minerals and over-the-counter drugs. It is safest to leave personal items at home.  Be sure to tell your doctor:   If you have any allergies.   If there s any chance you are pregnant.   If you are breastfeeding.   If you have any special needs.  You may have contrast for this exam. To prepare:   Do not eat or drink for 2 hours before your exam. If you need to take medicine, you may take it with small sips of water. (We may ask you to take liquid medicine as well.)   The day before your exam, drink extra fluids at least six 8-ounce glasses (unless your doctor tells you to restrict your fluids).  Patients over 70 or patients with diabetes or kidney problems:   If you haven t had a blood test (creatinine test) within the last 30 days, go to your clinic or Diagnostic Imaging Department for this test.  If you have diabetes:   If your kidney function is normal, continue taking your metformin (Avandamet, Glucophage, Glucovance, Metaglip) on the day of your exam.   If your kidney function is abnormal, wait 48 hours before restarting this medicine.  You will have oral contrast for this exam:   You will drink the contrast at home. Get this from your clinic or Diagnostic Imaging Department. Please follow the directions given.  Please wear loose  "clothing, such as a sweat suit or jogging clothes. Avoid snaps, zippers and other metal. We may ask you to undress and put on a hospital gown.  If you have any questions, please call the Imaging Department where you will have your exam.            Nov 20, 2017  2:20 PM CST   (Arrive by 2:05 PM)   Return Visit with Jarod Shaffer MD   [facility] ([facility] [place])    [address] [facility]  2nd [facility] [address] [phone]              Future tests that were ordered for you today     Open Future Orders        Priority Expected Expires Ordered    US Lower Extremity Venous Duplex Bilateral Routine  10/16/2018 10/16/2017            Who to contact     If you have questions or need follow up information about today's clinic visit or your schedule please contact [facility] directly at [phone].  Normal or non-critical lab and imaging results will be communicated to you by MyChart, letter or phone within 4 business days after the clinic has received the results. If you do not hear from us within 7 days, please contact the clinic through MyChart or phone. If you have a critical or abnormal lab result, we will notify you by phone as soon as possible.  Submit refill requests through [employer] [facility] or call your pharmacy and they will forward the refill request to us. Please allow 3 business days for your refill to be completed.          Additional Information About Your Visit        [employer] [facility] Information     [employer] [facility] lets you send messages to your doctor, view your test results, renew your prescriptions, schedule appointments and more. To sign up, go to [email or web address] . Click on \"Log in\" on the left side of the screen, which will take you to the Welcome page. Then click on \"Sign up Now\" on the right side of the page.     You will be asked to enter the access code listed below, as well as some personal information. Please follow the directions " "to create your username and password.     Your access code is: [email or web address]  Expires: 2018  9:38 AM     Your access code will  in 90 days. If you need help or a new code, please call your [place] clinic or [phone].        Care EveryWhere ID     This is your Care EveryWhere ID. This could be used by other organizations to access your [place] medical records  [id number]        Your Vitals Were     Pulse Temperature Respirations Height Last Period Pulse Oximetry    104 98.2  F (36.8  C) (Oral) 18 1.6 m (5' 2.99\") 2009 95%    BMI (Body Mass Index)                   32.9 kg/m2            Blood Pressure from Last 3 Encounters:   10/16/17 111/74   17 118/83   17 123/77    Weight from Last 3 Encounters:   10/16/17 84.2 kg (185 lb 11.2 oz)   17 83 kg (182 lb 14.4 oz)   17 82.2 kg (181 lb 3.2 oz)                 Today's Medication Changes          These changes are accurate as of: 10/16/17  9:38 AM.  If you have any questions, ask your nurse or doctor.               Start taking these medicines.        Dose/Directions    nystatin 833556 UNIT/ML suspension   Commonly known as:  MYCOSTATIN   Used for:  Thrush   Started by:  Augustina Noriega APRN CNP        Dose:  986551 Units   Take 5 mLs (500,000 Units) by mouth 4 times daily Swish and swallow 4 times daily- nothing to eat or drink 30 minutes afterward   Quantity:  60 mL   Refills:  0         These medicines have changed or have updated prescriptions.        Dose/Directions    gabapentin 100 MG capsule   Commonly known as:  NEURONTIN   This may have changed:  how much to take   Used for:  Pain in joint, multiple sites   Changed by:  Augustina Noriega APRN CNP        Dose:  100-200 mg   Take 1-2 capsules (100-200 mg) by mouth 3 times daily Start the day after chemo for one week   Quantity:  42 capsule   Refills:  1       metFORMIN 500 MG 24 hr tablet   Commonly known as:  GLUCOPHAGE-XR   This may have changed:    - how much to " take  - when to take this  - additional instructions   Used for:  Type 2 diabetes mellitus without complication, without long-term current use of insulin (H)        Take two tabs by mouth once daily with evening meal.   Quantity:  180 tablet   Refills:  PRN            Where to get your medicines      These medications were sent to [place], [place] - [address]  [address], [facility] [address]    Hours:  TRANSPLANT PHONE NUMBER [phone] Phone:  [phone]     nystatin 282519 UNIT/ML suspension         These medications were sent to [id number] - [place], [place] - [address] AT [facility] [address]  [address], [place] [address]    Hours:  24-hours Phone:  [phone]     gabapentin 100 MG capsule                Primary Care Provider Office Phone # Fax #    Morelia Ayon [phone] [address] [facility] [address]        Equal Access to Services     [facility] : Hadii aad ku hadasho Soomaali, waaxda luqadaha, qaybta kaalmada adeegyada, waxay mini hayalessandro betts . So [facility] [phone].    ATENCIÓN: Si habla español, tiene a martínez disposición servicios gratuitos de asistencia lingüística. Benjamíname al [phone].    We comply with applicable federal civil rights laws and [place] laws. We do not discriminate on the basis of race, color, national origin, age, disability, sex, sexual orientation, or gender identity.            Thank you!     Thank you for choosing [facility]  for your care. Our goal is always to provide you with excellent care. Hearing back from our patients is one way we can continue to improve our services. Please take a few minutes to complete the written survey that you may receive in the mail after your visit with us. Thank you!             Your Updated Medication List - Protect others around you: Learn how to safely  use, store and throw away your medicines at www.disposemymeds.org.          This list is accurate as of: 10/16/17  9:38 AM.  Always use your most recent med list.                   Brand Name Dispense Instructions for use Diagnosis    acetaminophen 325 MG tablet    TYLENOL    100 tablet    Take 2 tablets (650 mg) by mouth every 6 hours as needed for mild pain    Cellulitis and abscess of leg, Mass of pelvis       atorvastatin 80 MG tablet    LIPITOR    90 tablet    Take 1 tablet (80 mg) by mouth daily    Hyperlipidemia LDL goal <100       CALCIUM-MAGNESIUM-VITAMIN D PO      Take 2 tablets by mouth daily        * dexamethasone 4 MG tablet    DECADRON    2 tablet    Take 2 tablets (8 mg) by mouth every morning    Ovarian cancer, left (H), Encounter for long-term (current) use of medications       * dexamethasone 4 MG tablet    DECADRON    10 tablet    TAKE 5 TABLETS BY MOUTH TWICE  A DAY ( AT 12 HOURS AND 6 HOURS PRIOR TO TAXOL)    Ovarian cancer, left (H), Encounter for long-term (current) use of medications       EPINEPHrine 0.3 MG/0.3ML injection 2-pack    EPIPEN/ADRENACLICK/or ANY BX GENERIC EQUIV    1 each    Inject 0.3 mLs (0.3 mg) into the muscle once as needed for anaphylaxis    Bee allergy status       escitalopram 20 MG tablet    LEXAPRO    30 tablet    Take 1 tablet (20 mg) by mouth daily    Major depressive disorder, recurrent episode, mild (H)       fluticasone 50 MCG/ACT spray    FLONASE    48 mL    SHAKE WELL AND USE 2 SPRAYS IN EACH NOSTRIL DAILY    Chronic maxillary sinusitis       gabapentin 100 MG capsule    NEURONTIN    42 capsule    Take 1-2 capsules (100-200 mg) by mouth 3 times daily Start the day after chemo for one week    Pain in joint, multiple sites       L-GLUTAMINE PO      Take 2 g by mouth 2 times daily        lisinopril 10 MG tablet    PRINIVIL/ZESTRIL    90 tablet    Take 1 tablet (10 mg) by mouth daily    Essential hypertension       loratadine 10 MG tablet    CLARITIN    30 tablet     "Take 1 tablet (10 mg) by mouth daily    Pain in joint, multiple sites       LORazepam 1 MG tablet    ATIVAN    30 tablet    Take 1 tablet (1 mg) by mouth every 6 hours as needed (nausea/vomiting, anxiety or sleep)    Ovarian cancer, left (H), Encounter for long-term (current) use of medications       metFORMIN 500 MG 24 hr tablet    GLUCOPHAGE-XR    180 tablet    Take two tabs by mouth once daily with evening meal.    Type 2 diabetes mellitus without complication, without long-term current use of insulin (H)       MULTIVITAMIN ADULT PO      Take 1 tablet by mouth daily        nystatin [id number] UNIT/ML suspension    MYCOSTATIN    60 mL    Take 5 mLs (500,000 Units) by mouth 4 times daily Swish and swallow 4 times daily- nothing to eat or drink 30 minutes afterward    Thrush       order for DME     1 Units    Equipment being ordered: blood pressure monitor    Essential hypertension       * order for DME     1 each    Compression stockings 20-30 mm Hg. To be wear when directed by Hematology team and while awake for the first two years post clot.    Long-term (current) use of anticoagulants, Acute deep vein thrombosis (DVT) of left lower extremity, unspecified vein (H)       * order for DME     1 Bottle    Plain packing strip 1/4\"    Abscess of leg       * order for DME     1 Box    Sterile zoey tipped applicators    Abscess of leg       * order for DME     3 Bottle    USE 1/4 INCH  WIDTH PLAIN NU GAUZE PACKING TO DO SELF WOUND CARE OF LEFT LOWER LEG ONCE DAILY. DIMENSIONS OF WOUND PRESENTLY ARE 2 X 2 INCH AND APPROXIMATELY 1/2 INCH DEEP.  USES 6 INCHES OF PACKING CURRENTLY FOR DRESSING CHANGE.  FAX TO [facility] [phone]    Wound of left leg       * order for DME     30 pad    STERILE ADHESIVE PAD BANDAGE AT LEAST 4X4 INCH IN SIZE NEEDED FOR DAILY WOUND CARE TO LEFT LOWER LEG. FAX TO [facility] [phone]    Leg wound, left       polyethylene glycol Packet    MIRALAX/GLYCOLAX    7 packet    Take 17 g " by mouth daily    Mass of pelvis       PRO-BIOTIC BLEND PO      Take 1 capsule by mouth daily Enzymatic Therapy-probiotic pearls        prochlorperazine 10 MG tablet    COMPAZINE    30 tablet    Take 1 tablet (10 mg) by mouth every 6 hours as needed (nausea/vomiting)    Ovarian cancer, left (H), Encounter for long-term (current) use of medications       rivaroxaban ANTICOAGULANT 20 MG Tabs tablet    XARELTO    30 tablet    Take 1 tablet (20 mg) by mouth daily (with dinner)    Long-term (current) use of anticoagulants, Acute deep vein thrombosis (DVT) of left lower extremity, unspecified vein (H)       senna-docusate 8.6-50 MG per tablet    SENOKOT-S;PERICOLACE    100 tablet    Take 2 tablets by mouth 2 times daily Start with 1 tablet PO BID, If no bowel movement in 24 hours, increase to 2 tablets PO BID.  Hold for loose stools.    Mass of pelvis       traZODone 50 MG tablet    DESYREL    180 tablet    TAKE 1 TO 2 TABLETS(50  MG) BY MOUTH EVERY NIGHT AS NEEDED FOR SLEEP    Insomnia, unspecified type       vitamin B complex with vitamin C Tabs tablet      Take 1 tablet by mouth daily        vitamin D 1000 UNITS capsule      Take 2,000 Units by mouth daily        VYVANSE PO      Take 50 mg by mouth daily        * Notice:  This list has 7 medication(s) that are the same as other medications prescribed for you. Read the directions carefully, and ask your doctor or other care provider to review them with you.

## 2017-10-16 NOTE — NURSING NOTE
Powerport used to access port as pt is going to hospital after seeing provider, no orders found, paged provider with instructions to place orders and [place] [phone] to make them aware as writer karlo [facility] labs and sent, line flushed with NS and heparin and pt tolerated well. Toya Patel

## 2017-10-16 NOTE — NURSING NOTE
"Oncology Rooming Note    October 16, 2017 8:30 AM   Hafsa Corona is a 62 year old female who presents for:    Chief Complaint   Patient presents with     [facility]     Oncology Clinic Visit     Pre-Chemo Visit, Ovarian cancer, left (H).     Initial Vitals: /74  Pulse 104  Temp 98.2  F (36.8  C) (Oral)  Resp 18  Ht 1.6 m (5' 2.99\")  Wt 84.2 kg (185 lb 11.2 oz)  LMP 01/01/2009  SpO2 95%  BMI 32.9 kg/m2 Estimated body mass index is 32.9 kg/(m^2) as calculated from the following:    Height as of this encounter: 1.6 m (5' 2.99\").    Weight as of this encounter: 84.2 kg (185 lb 11.2 oz). Body surface area is 1.93 meters squared.  No Pain (0) Comment: Data Unavailable   Patient's last menstrual period was 01/01/2009.  Allergies reviewed: No  Medications reviewed: Yes    Medications: Medication refills not needed today.  Pharmacy name entered into [facility]:    [id number] - [place], [place] - [address] AT [facility] [address] [facility] [address] - [place], [place] - [address] [facility] - [place], [place] - [address] [facility] [address]    Clinical concerns: None Augustina Noriega was NOT notified.    7 minutes for nursing intake (face to face time)     Erika Mehta LPN              "

## 2017-10-16 NOTE — PROGRESS NOTES
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 She is undergoing treatment with carboplatin/paclitaxel.      HPI:  Hafsa comes to the clinic feeling well today. She is here for Taxol desensitization- took her steroids at 0115 and 0715. She notes that she had severe numbness, tingling, and electric pain from her elbows to her fingertips and knees down to her toes day 5-9 and also a \"wiggling pain\" she relates to Neulasta in her legs. The numbness and tingling was associated with lower extremity weakness, dizziness, and balance issues which required her to use a walking stick. She had been taking Claritin, L-glutamine, and vitamin B6 as well as gabapentin 100mg PO TID. These interventions were somewhat helpful. She saw an acupuncturist which she feels was most helpful and now reports only numbness to her fingertips. 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 She feels the dizziness may have been worsened because she hasn't been taking Flonase for her seasonal allergies. She has not been doing her daily lymphedema exercises and reports her LLE is more swollen than her RLE. Denies pain, warmth, erythema, or tenderness in her legs. Continues to take Smooth Move tea for constipation. She is otherwise eating and drinking well. Was able to spend time with friends outside the home which was good for her spirits.        Oncology History:  The patient has had a recent episode of lower abdominal pain in early July.  She was subsequently sent to the emergency room and was found to have a large 9 cm complex ovarian mass. She was admitted to the hospital for pain control.  7/3/17:  1187  17: " Exploratory laparotomy, total abdominal hysterectomy, left salpingo-oophorectomy, cancer staging including infracolic omentectomy, bilateral pelvic & para-aortic lymphadenectomy, peritoneal biopsies, evacuation of pelvic fluid by Dr. Cole.    FINAL DIAGNOSIS:   A. LEFT OVARY AND FALLOPIAN TUBE, LEFT SALPINGO-OOPHORECTOMY:   - Ovarian clear cell carcinoma   - Background of endometriosis   - Fallopian tube with no significant histologic abnormality.   B. UTERUS, HYSTERECTOMY:   -  Inactive endometrium   -  Myometrium with adenomyosis and leiomyoma.   -  Cervix with squamous metaplasia.   C. PERITONEUM, RIGHT PELVIS, BIOPSY:   - Fibroadipose tissue, negative for malignancy.   D. LYMPH NODES, RIGHT PELVIC, DISSECTION:   - Thirteen benign lymph nodes (0/13).   E. LYMPH NODES, LEFT PELVIC, DISSECTION:   - Seven benign lymph nodes (0/7).   F. PERITONEUM, LEFT PELVIS, BIOPSY:   - Fibroadipose tissue, negative for malignancy.   G. PERITONEUM, BLADDER, BIOPSY:   - Fibroadipose tissue  with acute and chronic inflammation, negative for malignancy.   H. PERITONEUM, POSTERIOR CUL-DE-SAC, BIOPSY:   - Fibroadipose tissue, negative for malignancy.   I. PERITONEUM, LEFT PARACOLIC GUTTER, BIOPSY:   - Fibroadipose tissue, negative for malignancy.   J. LYMPH NODES, LEFT PARA-AORTIC, DISSECTION:   - Five benign lymph nodes (0/5).   K. LYMPH NODES, RIGHT PARA-AORTIC, DISSECTION:   - Two benign lymph nodes (0/2).   L. PERITONEUM, RIGHT PARACOLIC GUTTER, BIOPSY:   - Fibroadipose tissue, negative for malignancy.   M. OMENTUM, OMENTECTOMY:   - Adipose tissue with reactive changes, negative for malignancy.   COMMENT:   The final diagnosis confirms the interpretation provided intraoperatively.   Report Name: Ovary or Fallopian Tube   Status: Submitted   Part(s) Involved:   A: Ovary and fallopian tube, left   Synoptic Report:   CLINICAL   Clinical History:   - Pelvic mass   SPECIMEN   Procedure:   - Left salpingo-oophorectomy   - Hysterectomy    - Omentectomy   - Peritoneal  biopsies   Lymph Node Sampling:   - Performed   Location:   - Pelvic lymph nodes   - Para-aortic lymph nodes   Specimen Integrity:   - Left ovary   Specimen Integrity of Left Ovary:   - Capsule intact   TUMOR   Primary Tumor Site(s):   - Left ovary   Left Ovary   Tumor Size of Left Ovary: 19 cm   Histologic Type:   - Clear cell carcinoma   Tumor Extent   Ovarian Surface Involvement:   - Absent   Specimen(s)   Extent of Left Ovary:   - Involved   Extent of Left Fallopian Tube:   - Not involved   Extent of Omentum:   - Not involved   Extent of Uterus:   - Not involved   Extent of Peritoneum:   - Not involved   Peritoneal Ascitic Fluid:   - Not performed / unknown   Pleural Fluid:   - Not performed / unknown   LYMPH NODES     Number of Pelvic Lymph Nodes Examined: 20     Number of Pelvic Lymph Nodes Involved: None identified     Number of Para-aortic Lymph Nodes Examined: 7     Number of Para-Aortic Lymph Nodes Involved: None identified   STAGE (PTNM, AJCC 7TH ED.)   Primary Tumor (pT):   - Ovary   Ovarian Primary Tumor (pT):   - pT1a: Tumor limited to 1 ovary; capsule intact, no tumor on ovarian surface. No malignant cells in ascites or peritoneal washings#   Regional Lymph Nodes (pN):   - pN0: No regional lymph node metastasis      07/31/17: Dr Shaffer follow up: Discussed with the patient that given the high risk histology, as well as ruptured mass before surgery, she would be qualified at higher risk of recurrence despite early stage ovarian cancer.  Recommended adjuvant chemotherapy. Plan for carboplatin of AUC of 6 and paclitaxel of 175, m2 for 3 cycles. Referral for genetic counselor and will see her back after those 3 cycles  08/03/17: Call from patient stating she is going for a second opinion and would like chemotherapy rescheduled to week of 8/14/17.     08/16/17: Cycle #1 Carbo/Taxol.  73. Significant paclitaxel reaction.  08/30/17: C1 carboplatin/inpatient  paclitaxel desensitization.   09/25/17: C2 carboplatin/paclitaxel- inpatient paclitaxel desensitization.  15.  10/16/17: C3 carboplatin/paclitaxel- switched to docetaxel given her neuropathy.  10.    Past Medical History:   Diagnosis Date     Anxiety state, unspecified     [id number]     Attention deficit disorder without mention of hyperactivity      Chronic rhinitis      Depressive disorder, not elsewhere classified      Panic disorder without agoraphobia      Pure hypercholesterolemia     Lipitor     Tachycardia, unspecified     9/1999-2/2004     Type II or unspecified type diabetes mellitus without mention of complication, not stated as uncontrolled     diagnosed 2004       Past Surgical History:   Procedure Laterality Date     HYSTERECTOMY TOTAL ABDOMINAL, BILATERAL SALPINGO-OOPHORECTOMY, NODE DISSECTION, COMBINED Left 7/7/2017    Procedure: COMBINED HYSTERECTOMY TOTAL ABDOMINAL, SALPINGO-OOPHORECTOMY, NODE DISSECTION;  Exploratory Laparotomy, Left Salpingo-Oophorectomy, Cancer Staging, Total Hysterectomy, Omentectomy, Evacuation of abdominal fluid, Lymph Node Dissection  Anesthesia Block ;  Surgeon: Serena Cole MD;  Location: [facility] OR     HYSTERECTOMY, PAP NO LONGER INDICATED  07/07/2017    Laparotomy; for ovarian cancer staging     INSERT PORT VASCULAR ACCESS Right 8/21/2017    Procedure: INSERT PORT VASCULAR ACCESS;  Single Lumen Chest Power Port;  Surgeon: Stephen Mike PA-C;  Location: [facility] OR     LYMPHADENECTOMY RETROPERITONEAL Bilateral 07/07/2017    Laparotomy; pelvics & para-aortics; for ovarian cancer staging     OMENTECTOMY  07/07/2017    Laparotomy; for ovarian cancer staging     SALPINGO OOPHORECTOMY,R/L/KAYY Right 2008    Salpingo Oophorectomy, RT     SALPINGO OOPHORECTOMY,R/L/KAYY Left 07/07/2017    Laparotomy; for ovarian cancer staging     SURGICAL HISTORY OF -       facial surgery d/t fall in 1/2002     SURGICAL HISTORY OF -       1985 removal of breast cyst      SURGICAL HISTORY OF -   2008    endometrial ablation       Current Outpatient Prescriptions   Medication     dexamethasone (DECADRON) 4 MG tablet     fluticasone (FLONASE) 50 MCG/ACT spray     LORazepam (ATIVAN) 1 MG tablet     loratadine (CLARITIN) 10 MG tablet     gabapentin (NEURONTIN) 100 MG capsule     dexamethasone (DECADRON) 4 MG tablet     order for DME     order for DME     prochlorperazine (COMPAZINE) 10 MG tablet     rivaroxaban ANTICOAGULANT (XARELTO) 20 MG TABS tablet     traZODone (DESYREL) 50 MG tablet     order for DME     order for DME     acetaminophen (TYLENOL) 325 MG tablet     polyethylene glycol (MIRALAX/GLYCOLAX) Packet     senna-docusate (SENOKOT-S;PERICOLACE) 8.6-50 MG per tablet     Multiple Vitamins-Minerals (MULTIVITAMIN ADULT PO)     vitamin B complex with vitamin C (VITAMIN  B COMPLEX) TABS tablet     Lisdexamfetamine Dimesylate (VYVANSE PO)     order for DME     CALCIUM-MAGNESIUM-VITAMIN D PO     Probiotic Product (PRO-BIOTIC BLEND PO)     atorvastatin (LIPITOR) 80 MG tablet     order for DME     lisinopril (PRINIVIL/ZESTRIL) 10 MG tablet     metFORMIN (GLUCOPHAGE-XR) 500 MG 24 hr tablet     escitalopram (LEXAPRO) 20 MG tablet     EPINEPHrine (EPIPEN) 0.3 MG/0.3ML injection     Cholecalciferol (VITAMIN D) 1000 UNIT capsule     No current facility-administered medications for this visit.           Allergies   Allergen Reactions     Wasps [Hornets] Anaphylaxis     Paclitaxel Difficulty breathing     Sulfa Drugs Hives       Family History   Problem Relation Age of Onset     C.A.D. Father      DIABETES Father      Hypertension Father      CEREBROVASCULAR DISEASE Father      Psychotic Disorder Father      Pancreatic Cancer Father      C.A.D. Mother      Hypertension Mother      Breast Cancer Mother      Psychotic Disorder Mother      Colon Cancer Mother      CANCER Mother      Bone cancer     Prostate Problems Brother      cleared      Psychotic Disorder Sister      x2     Neurologic  Disorder Sister      Psychotic Disorder Brother      x2     Depression Brother      major depressive disorder and OCD     Anxiety Disorder Brother      MENTAL ILLNESS Brother      Psychotic Disorder Maternal Grandmother      ?     Psychotic Disorder Maternal Grandfather      ?     Psychotic Disorder Paternal Grandmother      Schizophernia     Psychotic Disorder Paternal Grandfather      ?     Respiratory Paternal Grandfather       of emphysema and smoking     Psychotic Disorder Sister      Other - See Comments Sister      small kidney stone      Cancer - colorectal No family hx of        Social History     Social History     Marital status: Single     Spouse name: N/A     Number of children: N/A     Years of education: N/A     Occupational History     Not on file.     Social History Main Topics     Smoking status: Never Smoker     Smokeless tobacco: Never Used     Alcohol use Yes      Comment: rarely     Drug use: No     Sexual activity: Yes     Birth control/ protection: None     Other Topics Concern     Not on file     Social History Narrative           ROS  General: + sleep disturbance and weakness. Denies fatigue, changes in weight, appetite changes, night sweats, hot flashes, fever, chills  HEENT: + hair loss, congestion. Denies headaches, visual difficulty or disturbances, spots or floaters, diplopia, masses, head injury, tinnitus, hearing loss, epistaxis, congestion, problems with teeth or gums, dysphonia, or dysphagia  Pulmonary: Denies cough, sputum, hemoptysis, shortness of breath, dyspnea on exertion, wheezing, or allergies  Cardiovascular: Denies chest pain, fainting, palpitations, murmurs, activity intolerance, swelling in legs, or high blood pressure  Gastrointestinal: + constipation. Denies nausea, vomiting, diarrhea, abdominal pain, bloating, heartburn, melena, hematochezia, or jaundice  Genitourinary: Denies dysuria, urinary urgency or frequency, hematuria, cloudy or malodorous urine,  "incontinence, repeat urinary tract infections, flank pain, pelvic pain, vaginal bleeding, vaginal discharge, or vaginal dryness  Sexual Function: Denies pain with intercourse, changes in libido, arousal difficulty, or changes in orgasm  Integumentary: Denies rashes, sores, changing moles, or scarring  Hematologic: Denies swollen lymph nodes, masses, easy bruising, or easy bleeding  Musculoskeletal: + myalgias, arthralgias, weakness. Denies falls, back pain, stiffness, or muscle cramps  Neurologic: + numbness, tingling, difficulty walking, dizziness. Denies changes in memory, seizures, or tremors  Psychiatric: + anxiety. Denies depression, nervousness, mood changes, suicidal thoughts, or difficulty concentrating  Endocrine: Denies polydipsia, polyuria, temperature intolerance, or history of thyroid disease      Physical Exam:    /74  Pulse 104  Temp 98.2  F (36.8  C) (Oral)  Resp 18  Ht 1.6 m (5' 2.99\")  Wt 84.2 kg (185 lb 11.2 oz)  LMP 01/01/2009  SpO2 95%  BMI 32.9 kg/m2    CONSTITUTIONAL: Alert non-toxic appearing female in no acute distress  HEAD: Normocephalic, atraumatic  EYES: PERRLA; no scleral icterus  ENT: Oropharynx pink without lesions; thick white coating to tongue that does not scrape off easily  NECK: Neck supple without lymphadenopathy  RESPIRATORY: Lungs clear to auscultation, no increased work of breathing noted  CV: Tachycardic, regular rhythm, S1S2, no clicks, murmurs, rubs, or gallops; bilateral lower extremities with trace edema, L>R- no erythema, warmth, or tenderness, negative Sergei's sign, dorsalis pedis pulses 2+ bilaterally  GASTROINTESTINAL: Normoactive bowel sounds x4 quadrants, abdomen soft, non-distended, and non-tender to palpation without masses or organomegaly  GENITOURINARY: Not indicated  LYMPHATIC: Cervical, supraclavicular, and inguinal nodes without lymphadenopathy  MUSCULOSKELETAL: Moves all extremities, no obvious muscle wasting  NEUROLOGIC: No gross deficits, " normal gait  SKIN: Appropriate color for race, warm and dry, no rashes or lesions to unclothed skin  PSYCHIATRIC: Pleasant and interactive, affect bright, speech tangential at times, makes appropriate eye contact, thought process linear      Labs:      10/16/2017   Hemoglobin 11.7 - 15.7 g/dL 9.7 (A)   Hematocrit 35.0 - 47.0 % 31.1 (A)   Platelet Count 150 - 450 10e9/L 313   Absolute Neutrophil 1.6 - 8.3 10e9/L 7.2   Sodium 133 - 144 mmol/L 138   Potassium 3.4 - 5.3 mmol/L 4.1   Chloride 94 - 109 mmol/L 106   Carbon Dioxide 20 - 32 mmol/L 25   Urea Nitrogen 7 - 30 mg/dL 24   Creatinine 0.52 - 1.04 mg/dL 0.67   Calcium 8.5 - 10.1 mg/dL 8.9   Bilirubin Total 0.2 - 1.3 mg/dL 0.3   ALT 0 - 50 U/L 23   AST 0 - 45 U/L 19   Alkaline Phosphatase 40 - 150 U/L 103   Albumin 3.4 - 5.0 g/dL 3.4   Protein Total 6.8 - 8.8 g/dL 7.1   WBC 4.0 - 11.0 10e9/L 8.5   [place] [address]    Assessment/Plan:  1) Stage IC2 clear cell ovarian carcinoma: Significantly worsening neuropathy. Discussed with Dr. Shaffer, will switch paclitaxel to docetaxel. She will need to have docetaxel as an outpatient and therefore chemotherapy will be rescheduled to 10/18/17. She will receive dexamethasone premedication prior to docetaxel. Discussed that her reaction was likely due to cremaphor with paclitaxel rather than a true taxane allergy, but to be alert for s/sxs reaction and contact her nurse immediately should she experience this. Will have imaging and treatment planning with Dr. Shaffer after this cycle. Reviewed signs and symptoms for when she should contact the clinic or seek additional care, including but not limited to fever, chills, inability to keep down food or fluids, nausea and vomiting not controlled with antiemetics, and diarrhea leading to dehydration. Patient to contact the clinic with any questions or concerns in the interim. Shanon Oswald RN in to discuss switch to docetaxel. Briefly discussed surveillance plan.  2) Neuropathy: Acutely  worse to the point of impacting function, although has decreased in intensity with time and acupuncture. Switch to docetaxel. Continue L-glutamine, vitamin B6, and Claritin. Will increase gabapentin to 200mg PO TID the week after chemotherapy. If this dose is too high for her, to switch back to 100mg PO TID. May take Tylenol as needed, no more than 3g daily. Continue with acupuncture.  3) Edema: US of BLE negative for DVT, continue with lymphedema therapy  4) White coating to tongue: Suspect thrush. Nystatin swish and spit 500,000 units QID x14 days, nothing to eat or drink for 30 minutes afterward.  5) Genetic counseling: Performed, awaiting results  6) Health maintenance issues discussed include to follow up with PCP for routine health maintenance exams, co-morbid conditions, and non-gynecologic concerns.  7) Patient verbalized understanding of and agreement with plan    A total of 35 minutes of face to face time were spent with the patient with over 50% of that time spent in counseling, coordination of care, education, and symptom management.    MARLON Ogden, FNP-C  Gynecologic Oncology  [facility]  Pager: [phone]

## 2017-10-16 NOTE — LETTER
"10/16/2017       RE: Hafsa Corona  [address]  [place] [address]     Dear Colleague,    Thank you for referring your patient, Hafsa Corona, to the [facility]. Please see a copy of my visit note below.    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 She is undergoing treatment with carboplatin/paclitaxel.      HPI:  Hafsa comes to the clinic feeling well today. She is here for Taxol desensitization- took her steroids at 0115 and 0715. She notes that she had severe numbness, tingling, and electric pain from her elbows to her fingertips and knees down to her toes day 5-9 and also a \"wiggling pain\" she relates to Neulasta in her legs. The numbness and tingling was associated with lower extremity weakness, dizziness, and balance issues which required her to use a walking stick. She had been taking Claritin, L-glutamine, and vitamin B6 as well as gabapentin 100mg PO TID. These interventions were somewhat helpful. She saw an acupuncturist which she feels was most helpful and now reports only numbness to her fingertips. 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 She feels the dizziness may have been worsened because she hasn't been taking Flonase for her seasonal allergies. She has not been doing her daily lymphedema exercises and reports her LLE is more swollen than her RLE. Denies pain, warmth, erythema, or tenderness in her legs. Continues to take Smooth Move tea for constipation. She is otherwise eating and drinking well. Was able to spend time with friends outside the home which was good for her spirits.        Oncology History:  The patient has had a recent " episode of lower abdominal pain in early July.  She was subsequently sent to the emergency room and was found to have a large 9 cm complex ovarian mass. She was admitted to the hospital for pain control.  7/3/17:  1187  07/07/17: Exploratory laparotomy, total abdominal hysterectomy, left salpingo-oophorectomy, cancer staging including infracolic omentectomy, bilateral pelvic & para-aortic lymphadenectomy, peritoneal biopsies, evacuation of pelvic fluid by Dr. Cole.    FINAL DIAGNOSIS:   A. LEFT OVARY AND FALLOPIAN TUBE, LEFT SALPINGO-OOPHORECTOMY:   - Ovarian clear cell carcinoma   - Background of endometriosis   - Fallopian tube with no significant histologic abnormality.   B. UTERUS, HYSTERECTOMY:   -  Inactive endometrium   -  Myometrium with adenomyosis and leiomyoma.   -  Cervix with squamous metaplasia.   C. PERITONEUM, RIGHT PELVIS, BIOPSY:   - Fibroadipose tissue, negative for malignancy.   D. LYMPH NODES, RIGHT PELVIC, DISSECTION:   - Thirteen benign lymph nodes (0/13).   E. LYMPH NODES, LEFT PELVIC, DISSECTION:   - Seven benign lymph nodes (0/7).   F. PERITONEUM, LEFT PELVIS, BIOPSY:   - Fibroadipose tissue, negative for malignancy.   G. PERITONEUM, BLADDER, BIOPSY:   - Fibroadipose tissue  with acute and chronic inflammation, negative for malignancy.   H. PERITONEUM, POSTERIOR CUL-DE-SAC, BIOPSY:   - Fibroadipose tissue, negative for malignancy.   I. PERITONEUM, LEFT PARACOLIC GUTTER, BIOPSY:   - Fibroadipose tissue, negative for malignancy.   J. LYMPH NODES, LEFT PARA-AORTIC, DISSECTION:   - Five benign lymph nodes (0/5).   K. LYMPH NODES, RIGHT PARA-AORTIC, DISSECTION:   - Two benign lymph nodes (0/2).   L. PERITONEUM, RIGHT PARACOLIC GUTTER, BIOPSY:   - Fibroadipose tissue, negative for malignancy.   M. OMENTUM, OMENTECTOMY:   - Adipose tissue with reactive changes, negative for malignancy.   COMMENT:   The final diagnosis confirms the interpretation provided intraoperatively.   Report Name:  Ovary or Fallopian Tube   Status: Submitted   Part(s) Involved:   A: Ovary and fallopian tube, left   Synoptic Report:   CLINICAL   Clinical History:   - Pelvic mass   SPECIMEN   Procedure:   - Left salpingo-oophorectomy   - Hysterectomy   - Omentectomy   - Peritoneal  biopsies   Lymph Node Sampling:   - Performed   Location:   - Pelvic lymph nodes   - Para-aortic lymph nodes   Specimen Integrity:   - Left ovary   Specimen Integrity of Left Ovary:   - Capsule intact   TUMOR   Primary Tumor Site(s):   - Left ovary   Left Ovary   Tumor Size of Left Ovary: 19 cm   Histologic Type:   - Clear cell carcinoma   Tumor Extent   Ovarian Surface Involvement:   - Absent   Specimen(s)   Extent of Left Ovary:   - Involved   Extent of Left Fallopian Tube:   - Not involved   Extent of Omentum:   - Not involved   Extent of Uterus:   - Not involved   Extent of Peritoneum:   - Not involved   Peritoneal Ascitic Fluid:   - Not performed / unknown   Pleural Fluid:   - Not performed / unknown   LYMPH NODES     Number of Pelvic Lymph Nodes Examined: 20     Number of Pelvic Lymph Nodes Involved: None identified     Number of Para-aortic Lymph Nodes Examined: 7     Number of Para-Aortic Lymph Nodes Involved: None identified   STAGE (PTNM, AJCC 7TH ED.)   Primary Tumor (pT):   - Ovary   Ovarian Primary Tumor (pT):   - pT1a: Tumor limited to 1 ovary; capsule intact, no tumor on ovarian surface. No malignant cells in ascites or peritoneal washings#   Regional Lymph Nodes (pN):   - pN0: No regional lymph node metastasis      07/31/17: Dr Shaffer follow up: Discussed with the patient that given the high risk histology, as well as ruptured mass before surgery, she would be qualified at higher risk of recurrence despite early stage ovarian cancer.  Recommended adjuvant chemotherapy. Plan for carboplatin of AUC of 6 and paclitaxel of 175, m2 for 3 cycles. Referral for genetic counselor and will see her back after those 3 cycles  08/03/17: Call  from patient stating she is going for a second opinion and would like chemotherapy rescheduled to week of 8/14/17.     08/16/17: Cycle #1 Carbo/Taxol.  73. Significant paclitaxel reaction.  08/30/17: C1 carboplatin/inpatient paclitaxel desensitization.   09/25/17: C2 carboplatin/paclitaxel- inpatient paclitaxel desensitization.  15.  10/16/17: C3 carboplatin/paclitaxel- switched to docetaxel given her neuropathy.  10.    Past Medical History:   Diagnosis Date     Anxiety state, unspecified     [id number]     Attention deficit disorder without mention of hyperactivity      Chronic rhinitis      Depressive disorder, not elsewhere classified      Panic disorder without agoraphobia      Pure hypercholesterolemia     Lipitor     Tachycardia, unspecified     9/1999-2/2004     Type II or unspecified type diabetes mellitus without mention of complication, not stated as uncontrolled     diagnosed 2004       Past Surgical History:   Procedure Laterality Date     HYSTERECTOMY TOTAL ABDOMINAL, BILATERAL SALPINGO-OOPHORECTOMY, NODE DISSECTION, COMBINED Left 7/7/2017    Procedure: COMBINED HYSTERECTOMY TOTAL ABDOMINAL, SALPINGO-OOPHORECTOMY, NODE DISSECTION;  Exploratory Laparotomy, Left Salpingo-Oophorectomy, Cancer Staging, Total Hysterectomy, Omentectomy, Evacuation of abdominal fluid, Lymph Node Dissection  Anesthesia Block ;  Surgeon: Serena Cole MD;  Location: [facility] OR     HYSTERECTOMY, PAP NO LONGER INDICATED  07/07/2017    Laparotomy; for ovarian cancer staging     INSERT PORT VASCULAR ACCESS Right 8/21/2017    Procedure: INSERT PORT VASCULAR ACCESS;  Single Lumen Chest Power Port;  Surgeon: Stephen Mike PA-C;  Location: [facility] OR     LYMPHADENECTOMY RETROPERITONEAL Bilateral 07/07/2017    Laparotomy; pelvics & para-aortics; for ovarian cancer staging     OMENTECTOMY  07/07/2017    Laparotomy; for ovarian cancer staging     SALPINGO OOPHORECTOMY,R/L/KAYY Right 2008    Salpingo Oophorectomy,  RT     SALPINGO OOPHORECTOMY,R/L/KAYY Left 07/07/2017    Laparotomy; for ovarian cancer staging     SURGICAL HISTORY OF -       facial surgery d/t fall in 1/2002     SURGICAL HISTORY OF -       1985 removal of breast cyst     SURGICAL HISTORY OF -   2008    endometrial ablation       Current Outpatient Prescriptions   Medication     dexamethasone (DECADRON) 4 MG tablet     fluticasone (FLONASE) 50 MCG/ACT spray     LORazepam (ATIVAN) 1 MG tablet     loratadine (CLARITIN) 10 MG tablet     gabapentin (NEURONTIN) 100 MG capsule     dexamethasone (DECADRON) 4 MG tablet     order for DME     order for DME     prochlorperazine (COMPAZINE) 10 MG tablet     rivaroxaban ANTICOAGULANT (XARELTO) 20 MG TABS tablet     traZODone (DESYREL) 50 MG tablet     order for DME     order for DME     acetaminophen (TYLENOL) 325 MG tablet     polyethylene glycol (MIRALAX/GLYCOLAX) Packet     senna-docusate (SENOKOT-S;PERICOLACE) 8.6-50 MG per tablet     Multiple Vitamins-Minerals (MULTIVITAMIN ADULT PO)     vitamin B complex with vitamin C (VITAMIN  B COMPLEX) TABS tablet     Lisdexamfetamine Dimesylate (VYVANSE PO)     order for DME     CALCIUM-MAGNESIUM-VITAMIN D PO     Probiotic Product (PRO-BIOTIC BLEND PO)     atorvastatin (LIPITOR) 80 MG tablet     order for DME     lisinopril (PRINIVIL/ZESTRIL) 10 MG tablet     metFORMIN (GLUCOPHAGE-XR) 500 MG 24 hr tablet     escitalopram (LEXAPRO) 20 MG tablet     EPINEPHrine (EPIPEN) 0.3 MG/0.3ML injection     Cholecalciferol (VITAMIN D) 1000 UNIT capsule     No current facility-administered medications for this visit.           Allergies   Allergen Reactions     Wasps [Hornets] Anaphylaxis     Paclitaxel Difficulty breathing     Sulfa Drugs Hives       Family History   Problem Relation Age of Onset     C.A.D. Father      DIABETES Father      Hypertension Father      CEREBROVASCULAR DISEASE Father      Psychotic Disorder Father      Pancreatic Cancer Father      C.A.D. Mother      Hypertension  Mother      Breast Cancer Mother      Psychotic Disorder Mother      Colon Cancer Mother      CANCER Mother      Bone cancer     Prostate Problems Brother      cleared      Psychotic Disorder Sister      x2     Neurologic Disorder Sister      Psychotic Disorder Brother      x2     Depression Brother      major depressive disorder and OCD     Anxiety Disorder Brother      MENTAL ILLNESS Brother      Psychotic Disorder Maternal Grandmother      ?     Psychotic Disorder Maternal Grandfather      ?     Psychotic Disorder Paternal Grandmother      Schizophernia     Psychotic Disorder Paternal Grandfather      ?     Respiratory Paternal Grandfather       of emphysema and smoking     Psychotic Disorder Sister      Other - See Comments Sister      small kidney stone      Cancer - colorectal No family hx of        Social History     Social History     Marital status: Single     Spouse name: N/A     Number of children: N/A     Years of education: N/A     Occupational History     Not on file.     Social History Main Topics     Smoking status: Never Smoker     Smokeless tobacco: Never Used     Alcohol use Yes      Comment: rarely     Drug use: No     Sexual activity: Yes     Birth control/ protection: None     Other Topics Concern     Not on file     Social History Narrative           ROS  General: + sleep disturbance and weakness. Denies fatigue, changes in weight, appetite changes, night sweats, hot flashes, fever, chills  HEENT: + hair loss, congestion. Denies headaches, visual difficulty or disturbances, spots or floaters, diplopia, masses, head injury, tinnitus, hearing loss, epistaxis, congestion, problems with teeth or gums, dysphonia, or dysphagia  Pulmonary: Denies cough, sputum, hemoptysis, shortness of breath, dyspnea on exertion, wheezing, or allergies  Cardiovascular: Denies chest pain, fainting, palpitations, murmurs, activity intolerance, swelling in legs, or high blood pressure  Gastrointestinal: +  "constipation. Denies nausea, vomiting, diarrhea, abdominal pain, bloating, heartburn, melena, hematochezia, or jaundice  Genitourinary: Denies dysuria, urinary urgency or frequency, hematuria, cloudy or malodorous urine, incontinence, repeat urinary tract infections, flank pain, pelvic pain, vaginal bleeding, vaginal discharge, or vaginal dryness  Sexual Function: Denies pain with intercourse, changes in libido, arousal difficulty, or changes in orgasm  Integumentary: Denies rashes, sores, changing moles, or scarring  Hematologic: Denies swollen lymph nodes, masses, easy bruising, or easy bleeding  Musculoskeletal: + myalgias, arthralgias, weakness. Denies falls, back pain, stiffness, or muscle cramps  Neurologic: + numbness, tingling, difficulty walking, dizziness. Denies changes in memory, seizures, or tremors  Psychiatric: + anxiety. Denies depression, nervousness, mood changes, suicidal thoughts, or difficulty concentrating  Endocrine: Denies polydipsia, polyuria, temperature intolerance, or history of thyroid disease      Physical Exam:    /74  Pulse 104  Temp 98.2  F (36.8  C) (Oral)  Resp 18  Ht 1.6 m (5' 2.99\")  Wt 84.2 kg (185 lb 11.2 oz)  LMP 01/01/2009  SpO2 95%  BMI 32.9 kg/m2    CONSTITUTIONAL: Alert non-toxic appearing female in no acute distress  HEAD: Normocephalic, atraumatic  EYES: PERRLA; no scleral icterus  ENT: Oropharynx pink without lesions; thick white coating to tongue that does not scrape off easily  NECK: Neck supple without lymphadenopathy  RESPIRATORY: Lungs clear to auscultation, no increased work of breathing noted  CV: Tachycardic, regular rhythm, S1S2, no clicks, murmurs, rubs, or gallops; bilateral lower extremities with trace edema, L>R- no erythema, warmth, or tenderness, negative Sergei's sign, dorsalis pedis pulses 2+ bilaterally  GASTROINTESTINAL: Normoactive bowel sounds x4 quadrants, abdomen soft, non-distended, and non-tender to palpation without masses or " organomegaly  GENITOURINARY: Not indicated  LYMPHATIC: Cervical, supraclavicular, and inguinal nodes without lymphadenopathy  MUSCULOSKELETAL: Moves all extremities, no obvious muscle wasting  NEUROLOGIC: No gross deficits, normal gait  SKIN: Appropriate color for race, warm and dry, no rashes or lesions to unclothed skin  PSYCHIATRIC: Pleasant and interactive, affect bright, speech tangential at times, makes appropriate eye contact, thought process linear      Labs:      10/16/2017   Hemoglobin 11.7 - 15.7 g/dL 9.7 (A)   Hematocrit 35.0 - 47.0 % 31.1 (A)   Platelet Count 150 - 450 10e9/L 313   Absolute Neutrophil 1.6 - 8.3 10e9/L 7.2   Sodium 133 - 144 mmol/L 138   Potassium 3.4 - 5.3 mmol/L 4.1   Chloride 94 - 109 mmol/L 106   Carbon Dioxide 20 - 32 mmol/L 25   Urea Nitrogen 7 - 30 mg/dL 24   Creatinine 0.52 - 1.04 mg/dL 0.67   Calcium 8.5 - 10.1 mg/dL 8.9   Bilirubin Total 0.2 - 1.3 mg/dL 0.3   ALT 0 - 50 U/L 23   AST 0 - 45 U/L 19   Alkaline Phosphatase 40 - 150 U/L 103   Albumin 3.4 - 5.0 g/dL 3.4   Protein Total 6.8 - 8.8 g/dL 7.1   WBC 4.0 - 11.0 10e9/L 8.5   [place] [address]    Assessment/Plan:  1) Stage IC2 clear cell ovarian carcinoma: Significantly worsening neuropathy. Discussed with Dr. Shaffer, will switch paclitaxel to docetaxel. She will need to have docetaxel as an outpatient and therefore chemotherapy will be rescheduled to 10/18/17. She will receive dexamethasone premedication prior to docetaxel. Discussed that her reaction was likely due to cremaphor with paclitaxel rather than a true taxane allergy, but to be alert for s/sxs reaction and contact her nurse immediately should she experience this. Will have imaging and treatment planning with Dr. Shaffer after this cycle. Reviewed signs and symptoms for when she should contact the clinic or seek additional care, including but not limited to fever, chills, inability to keep down food or fluids, nausea and vomiting not controlled with antiemetics, and  diarrhea leading to dehydration. Patient to contact the clinic with any questions or concerns in the interim. Shanon Oswald RN in to discuss switch to docetaxel. Briefly discussed surveillance plan.  2) Neuropathy: Acutely worse to the point of impacting function, although has decreased in intensity with time and acupuncture. Switch to docetaxel. Continue L-glutamine, vitamin B6, and Claritin. Will increase gabapentin to 200mg PO TID the week after chemotherapy. If this dose is too high for her, to switch back to 100mg PO TID. May take Tylenol as needed, no more than 3g daily. Continue with acupuncture.  3) Edema: US of BLE negative for DVT, continue with lymphedema therapy  4) White coating to tongue: Suspect thrush. Nystatin swish and spit 500,000 units QID x14 days, nothing to eat or drink for 30 minutes afterward.  5) Genetic counseling: Performed, awaiting results  6) Health maintenance issues discussed include to follow up with PCP for routine health maintenance exams, co-morbid conditions, and non-gynecologic concerns.  7) Patient verbalized understanding of and agreement with plan    A total of 35 minutes of face to face time were spent with the patient with over 50% of that time spent in counseling, coordination of care, education, and symptom management.    MARLON Ogden, FNP-C  Gynecologic Oncology  [facility]  Pager: [phone]

## 2017-10-16 NOTE — Clinical Note
In room. Needs US of BLE today. Please change CT CAP to closer to 11/20 (before [place] visit). Needs chemo scheduled this Wednesday.

## 2017-10-16 NOTE — PROGRESS NOTES
Care Coordinator Note  Patient to have chemotherapy plan change to Taxotere/Carbo secondary to increased neuropathy.  Reviewed chemotherapy card on Taxotere with patient, copy given including written instructions for Dexamethasone 8 mg BID x 3 days to start day before chemotherapy.  Hospital admission department notified to cancel admission planned for today and chemotherapy rescheduled to Wednesday 10/18/17 at [facility].  Dexamethasone script sent to [facility].  Patient has long term disability forms she plans to complete today.  She also has questions about pain regimen post chemotherapy to cover neuropathy discomfort.  Question will be reviewed with Augustina Noriega NP.    Patient verbalized back understanding of the above information discussed.   Face to face time spent with patient:  10 minutes    Shanon SMITH, RN  Care Coordinator  Gynecologic Cancer   Office:  [phone]  Pager: [phone] #[phone]

## 2017-10-17 ENCOUNTER — CARE COORDINATION (OUTPATIENT)
Dept: ONCOLOGY | Facility: CLINIC | Age: 62
End: 2017-10-17

## 2017-10-17 NOTE — PROGRESS NOTES
Care Coordinator Note  Reviewed treatment schedule and neuropathy pain management plan as outlined by Augustina Noriega NP.  Patient also left long term disability forms to be completed.      Shanon Oswald MSN, RN  Care Coordinator  Gynecologic Cancer   Office:  [phone]  Pager: [phone] #[phone]

## 2017-10-18 ENCOUNTER — INFUSION THERAPY VISIT (OUTPATIENT)
Dept: ONCOLOGY | Facility: CLINIC | Age: 62
End: 2017-10-18
Attending: OBSTETRICS & GYNECOLOGY
Payer: COMMERCIAL

## 2017-10-18 VITALS
DIASTOLIC BLOOD PRESSURE: 81 MMHG | SYSTOLIC BLOOD PRESSURE: 125 MMHG | OXYGEN SATURATION: 97 % | RESPIRATION RATE: 18 BRPM | HEART RATE: 78 BPM | TEMPERATURE: 98.3 F

## 2017-10-18 DIAGNOSIS — T45.1X5A CHEMOTHERAPY-INDUCED NEUTROPENIA (H): Primary | ICD-10-CM

## 2017-10-18 DIAGNOSIS — Z79.899 ENCOUNTER FOR LONG-TERM (CURRENT) USE OF MEDICATIONS: ICD-10-CM

## 2017-10-18 DIAGNOSIS — C56.2 OVARIAN CANCER, LEFT (H): ICD-10-CM

## 2017-10-18 DIAGNOSIS — D70.1 CHEMOTHERAPY-INDUCED NEUTROPENIA (H): Primary | ICD-10-CM

## 2017-10-18 DIAGNOSIS — B37.0 THRUSH: ICD-10-CM

## 2017-10-18 PROCEDURE — 96417 CHEMO IV INFUS EACH ADDL SEQ: CPT

## 2017-10-18 PROCEDURE — 96375 TX/PRO/DX INJ NEW DRUG ADDON: CPT

## 2017-10-18 PROCEDURE — 25000128 H RX IP 250 OP 636: Mod: ZF | Performed by: OBSTETRICS & GYNECOLOGY

## 2017-10-18 PROCEDURE — 96413 CHEMO IV INFUSION 1 HR: CPT

## 2017-10-18 RX ORDER — EPINEPHRINE 0.3 MG/.3ML
INJECTION SUBCUTANEOUS
Status: DISCONTINUED
Start: 2017-10-18 | End: 2017-10-18 | Stop reason: WASHOUT

## 2017-10-18 RX ORDER — NYSTATIN 100000/ML
500000 SUSPENSION, ORAL (FINAL DOSE FORM) ORAL 4 TIMES DAILY
Qty: 473 ML | Refills: 0 | Status: SHIPPED | OUTPATIENT
Start: 2017-10-18 | End: 2017-11-01

## 2017-10-18 RX ORDER — LORAZEPAM 2 MG/ML
1 INJECTION INTRAMUSCULAR EVERY 6 HOURS PRN
Status: DISCONTINUED | OUTPATIENT
Start: 2017-10-18 | End: 2017-10-18 | Stop reason: HOSPADM

## 2017-10-18 RX ORDER — PALONOSETRON 0.05 MG/ML
0.25 INJECTION, SOLUTION INTRAVENOUS ONCE
Status: COMPLETED | OUTPATIENT
Start: 2017-10-18 | End: 2017-10-18

## 2017-10-18 RX ORDER — HEPARIN SODIUM (PORCINE) LOCK FLUSH IV SOLN 100 UNIT/ML 100 UNIT/ML
500 SOLUTION INTRAVENOUS EVERY 8 HOURS
Status: DISCONTINUED | OUTPATIENT
Start: 2017-10-18 | End: 2017-10-18 | Stop reason: HOSPADM

## 2017-10-18 RX ADMIN — PALONOSETRON HYDROCHLORIDE 0.25 MG: 0.25 INJECTION INTRAVENOUS at 07:36

## 2017-10-18 RX ADMIN — LORAZEPAM 1 MG: 2 INJECTION INTRAMUSCULAR; INTRAVENOUS at 07:34

## 2017-10-18 RX ADMIN — DOCETAXEL 140 MG: 80 INJECTION, SOLUTION, CONCENTRATE INTRAVENOUS at 07:48

## 2017-10-18 RX ADMIN — SODIUM CHLORIDE 250 ML: 9 INJECTION, SOLUTION INTRAVENOUS at 07:21

## 2017-10-18 RX ADMIN — CARBOPLATIN 680 MG: 10 INJECTION, SOLUTION INTRAVENOUS at 09:13

## 2017-10-18 RX ADMIN — SODIUM CHLORIDE, PRESERVATIVE FREE 500 UNITS: 5 INJECTION INTRAVENOUS at 10:04

## 2017-10-18 RX ADMIN — DEXAMETHASONE SODIUM PHOSPHATE: 10 INJECTION, SOLUTION INTRAMUSCULAR; INTRAVENOUS at 07:21

## 2017-10-18 ASSESSMENT — PAIN SCALES - GENERAL: PAINLEVEL: NO PAIN (0)

## 2017-10-18 NOTE — MR AVS SNAPSHOT
After Visit Summary   10/18/2017    Hafsa Corona    MRN: [id number]           Patient Information     Date Of Birth          1955        Visit Information        Provider Department      10/18/2017 7:00 AM UC 27 ATC;  ONCOLOGY INFUSION [facility]        Today's Diagnoses     Chemotherapy-induced neutropenia (H)    -  1    Ovarian cancer, left (H)        Encounter for long-term (current) use of medications        Thrush          Care Instructions    Contact Numbers    [facility] Main Line: [phone]  [facility] Triage:  [phone]    Call triage with chills and/or temperature greater than or equal to 100.5, uncontrolled nausea/vomiting, diarrhea, constipation, dizziness, shortness of breath, chest pain, bleeding, unexplained bruising, or any new/concerning symptoms, questions/concerns.     If you are having any concerning symptoms or wish to speak to a provider before your next infusion visit, please call your care coordinator or triage to notify them so we can adequately serve you.       After Hours: [phone]    If after hours, weekends, or holidays, call main hospital  and ask for Oncology doctor on call.         October 2017 Sunday Monday Tuesday Wednesday Thursday Friday Saturday   1     2     3     4     5     6     UMP NEW WITH ROOM   11:45 AM   (75 min.)   Mariam Sams GC   [facility] LAB DRAW    1:15 PM   (15 min.)   [facility] DRAW   [facility] Lab Draw 7       8     9     10     11     12     13     14       15     16     [facility] LAB DRAW    7:45 AM   (15 min.)   [facility] DRAW   [facility] Lab Draw     UMP RETURN ACTIVE TREATMENT    8:05 AM   (40 min.)   Augustina Noriega APRN CNP   [facility]     US LWR EXT VENOUS DUPLEX BILAT    1:30 PM   (60 min.)   [address] [facility] US 17     18     UMP ONC INFUSION 240    7:00 AM   (240 min.)    ONCOLOGY INFUSION   [facility]  [facility] 19     UMP RETURN WITH ROOM   11:45 AM   (60 min.)   Lola Vasquez, PhD ALEXANDER   [facility] 20     21       22     23     UMP NEW    3:00 PM   (60 min.)   Lola Vasquez, PhD BENJAMIN   [facility]  24     25     26     27     28       29     30     31 November 2017 Sunday Monday Tuesday Wednesday Thursday Friday Saturday                  1     2     3     UMP RETURN WITH ROOM    1:00 PM   (60 min.)   Mariam Sams GC   [facility] 4       5     6     7     8     9     10     11       12     13     14     CT CHEST/ABDOMEN/PELVIS W    9:45 AM   (20 min.)   UCCT1   [facility] CT 15     16     17     18       19     20     UMP RETURN    2:05 PM   (20 min.)   Jarod Shaffer MD   [facility] 21     22     23     24     25       26     27     28     29     30                         No results found for this or any previous visit (from the past 24 hour(s)).              Follow-ups after your visit        Your next 10 appointments already scheduled     Oct 19, 2017 12:00 PM CDT   (Arrive by 11:45 AM)   RETURN WITH ROOM with Lola Vasquez, PhD ALEXANDER, UC 2 118 CONSULT [facility] ([facility])    [address] [place] [address]  [address] [facility] [address]   [phone]            Oct 23, 2017  3:00 PM CDT   New Patient Visit with Lola Vasquez PhD LP   [facility]  ([facility])    [address]  [address] [place] [address]   [phone]            Nov 03, 2017  1:15 PM CDT   (Arrive by 1:00 PM)   RETURN WITH ROOM with Mariam Sams GC, UC 2 116 CONSULT [facility] ([facility])    [address] [facility]  [address] [facility] [address]   [phone]            Nov 14, 2017 10:00 AM CST   (Arrive by 9:45 AM)   CT  CHEST/ABDOMEN/PELVIS W CONTRAST with UCCT1   [facility] CT ([facility])    [address] [facility]  1st Floor  [facility] [address] [phone]           Please bring any scans or X-rays taken at other hospitals, if similar tests were done. Also bring a list of your medicines, including vitamins, minerals and over-the-counter drugs. It is safest to leave personal items at home.  Be sure to tell your doctor:   If you have any allergies.   If there s any chance you are pregnant.   If you are breastfeeding.   If you have any special needs.  You may have contrast for this exam. To prepare:   Do not eat or drink for 2 hours before your exam. If you need to take medicine, you may take it with small sips of water. (We may ask you to take liquid medicine as well.)   The day before your exam, drink extra fluids at least six 8-ounce glasses (unless your doctor tells you to restrict your fluids).  Patients over 70 or patients with diabetes or kidney problems:   If you haven t had a blood test (creatinine test) within the last 30 days, go to your clinic or Diagnostic Imaging Department for this test.  If you have diabetes:   If your kidney function is normal, continue taking your metformin (Avandamet, Glucophage, Glucovance, Metaglip) on the day of your exam.   If your kidney function is abnormal, wait 48 hours before restarting this medicine.  You will have oral contrast for this exam:   You will drink the contrast at home. Get this from your clinic or Diagnostic Imaging Department. Please follow the directions given.  Please wear loose clothing, such as a sweat suit or jogging clothes. Avoid snaps, zippers and other metal. We may ask you to undress and put on a hospital gown.  If you have any questions, please call the Imaging Department where you will have your exam.            Nov 20, 2017  2:20 PM CST   (Arrive by 2:05 PM)   Return Visit with Jarod Shaffer MD   [facility]  "[facility] ([facility])    [address] [facility]  2nd Floor  [facility] [address] [phone]              Who to contact     If you have questions or need follow up information about today's clinic visit or your schedule please contact [facility] directly at [phone].  Normal or non-critical lab and imaging results will be communicated to you by MyChart, letter or phone within 4 business days after the clinic has received the results. If you do not hear from us within 7 days, please contact the clinic through MyChart or phone. If you have a critical or abnormal lab result, we will notify you by phone as soon as possible.  Submit refill requests through appbackr or call your pharmacy and they will forward the refill request to us. Please allow 3 business days for your refill to be completed.          Additional Information About Your Visit        appbackr Information     appbackr lets you send messages to your doctor, view your test results, renew your prescriptions, schedule appointments and more. To sign up, go to [email or web address] . Click on \"Log in\" on the left side of the screen, which will take you to the Welcome page. Then click on \"Sign up Now\" on the right side of the page.     You will be asked to enter the access code listed below, as well as some personal information. Please follow the directions to create your username and password.     Your access code is: [email or web address]  Expires: 2018  9:38 AM     Your access code will  in 90 days. If you need help or a new code, please call your [facility] clinic or [phone].        Care EveryWhere ID     This is your Care EveryWhere ID. This could be used by other organizations to access your [facility] medical records  [id number]        Your Vitals Were     Pulse Temperature Respirations Last Period Pulse Oximetry       78 98.3  F (36.8  C) (Oral) 18 2009 97%        Blood " Pressure from Last 3 Encounters:   10/18/17 125/81   10/16/17 111/74   09/27/17 118/83    Weight from Last 3 Encounters:   10/16/17 84.2 kg (185 lb 11.2 oz)   09/27/17 83 kg (182 lb 14.4 oz)   09/25/17 82.2 kg (181 lb 3.2 oz)              Today, you had the following     No orders found for display         Today's Medication Changes          These changes are accurate as of: 10/18/17  8:41 AM.  If you have any questions, ask your nurse or doctor.               These medicines have changed or have updated prescriptions.        Dose/Directions    metFORMIN 500 MG 24 hr tablet   Commonly known as:  GLUCOPHAGE-XR   This may have changed:    - how much to take  - when to take this  - additional instructions   Used for:  Type 2 diabetes mellitus without complication, without long-term current use of insulin (H)        Take two tabs by mouth once daily with evening meal.   Quantity:  180 tablet   Refills:  PRN            Where to get your medicines      These medications were sent to [place] [id number] [facility]  [address] [facility] [address] [place] [address]    Hours:  TRANSPLANT PHONE NUMBER [phone] Phone:  [phone]     nystatin 024120 UNIT/ML suspension                Primary Care Provider Office Phone # Fax #    Morelia JAVIER Ayon [phone] [address] [facility] [address]        Equal Access to Services     RONEY ARANDA AH: Hadii serena mckeono Soaxel, waaxda luqadaha, qaybta kaalmada adeegyada, waxay mini friedman. So [facility] [phone].    ATENCIÓN: Si habla español, tiene a martínez disposición servicios gratuitos de asistencia lingüística. Llame al [phone].    We comply with applicable federal civil rights laws and [place] laws. We do not discriminate on the basis of race, color, national origin, age, disability, sex, sexual orientation, or gender identity.            Thank you!     Thank you for  [facility]  for your care. Our goal is always to provide you with excellent care. Hearing back from our patients is one way we can continue to improve our services. Please take a few minutes to complete the written survey that you may receive in the mail after your visit with us. Thank you!             Your Updated Medication List - Protect others around you: Learn how to safely use, store and throw away your medicines at www.disposemymeds.org.          This list is accurate as of: 10/18/17  8:41 AM.  Always use your most recent med list.                   Brand Name Dispense Instructions for use Diagnosis    acetaminophen 325 MG tablet    TYLENOL    100 tablet    Take 2 tablets (650 mg) by mouth every 6 hours as needed for mild pain    Cellulitis and abscess of leg, Mass of pelvis       atorvastatin 80 MG tablet    LIPITOR    90 tablet    Take 1 tablet (80 mg) by mouth daily    Hyperlipidemia LDL goal <100       CALCIUM-MAGNESIUM-VITAMIN D PO      Take 2 tablets by mouth daily        * dexamethasone 4 MG tablet    DECADRON    2 tablet    Take 2 tablets (8 mg) by mouth every morning    Ovarian cancer, left (H), Encounter for long-term (current) use of medications       * dexamethasone 4 MG tablet    DECADRON    10 tablet    TAKE 5 TABLETS BY MOUTH TWICE  A DAY ( AT 12 HOURS AND 6 HOURS PRIOR TO TAXOL)    Ovarian cancer, left (H), Encounter for long-term (current) use of medications       * dexamethasone 4 MG tablet    DECADRON    12 tablet    Take 2 tablets (8 mg) by mouth 2 times daily (with meals) for 3 days Start the day before docetaxel (Taxotere) is given.    Chemotherapy-induced neutropenia (H), Ovarian cancer, left (H), Encounter for long-term (current) use of medications       EPINEPHrine 0.3 MG/0.3ML injection 2-pack    EPIPEN/ADRENACLICK/or ANY BX GENERIC EQUIV    1 each    Inject 0.3 mLs (0.3 mg) into the muscle once as needed for anaphylaxis    Bee allergy status        escitalopram 20 MG tablet    LEXAPRO    30 tablet    Take 1 tablet (20 mg) by mouth daily    Major depressive disorder, recurrent episode, mild (H)       fluticasone 50 MCG/ACT spray    FLONASE    48 mL    SHAKE WELL AND USE 2 SPRAYS IN EACH NOSTRIL DAILY    Chronic maxillary sinusitis       gabapentin 100 MG capsule    NEURONTIN    42 capsule    Take 1-2 capsules (100-200 mg) by mouth 3 times daily Start the day after chemo for one week    Pain in joint, multiple sites       L-GLUTAMINE PO      Take 2 g by mouth 2 times daily        lisinopril 10 MG tablet    PRINIVIL/ZESTRIL    90 tablet    Take 1 tablet (10 mg) by mouth daily    Essential hypertension       loratadine 10 MG tablet    CLARITIN    30 tablet    Take 1 tablet (10 mg) by mouth daily    Pain in joint, multiple sites       LORazepam 1 MG tablet    ATIVAN    30 tablet    Take 1 tablet (1 mg) by mouth every 6 hours as needed (nausea/vomiting, anxiety or sleep)    Ovarian cancer, left (H), Encounter for long-term (current) use of medications       metFORMIN 500 MG 24 hr tablet    GLUCOPHAGE-XR    180 tablet    Take two tabs by mouth once daily with evening meal.    Type 2 diabetes mellitus without complication, without long-term current use of insulin (H)       MULTIVITAMIN ADULT PO      Take 1 tablet by mouth daily        nystatin [id number] UNIT/ML suspension    MYCOSTATIN    473 mL    Take 5 mLs (500,000 Units) by mouth 4 times daily Swish and swallow 4 times daily- nothing to eat or drink 30 minutes afterward    Thrush       order for DME     1 Units    Equipment being ordered: blood pressure monitor    Essential hypertension       * order for DME     1 each    Compression stockings 20-30 mm Hg. To be wear when directed by Hematology team and while awake for the first two years post clot.    Long-term (current) use of anticoagulants, Acute deep vein thrombosis (DVT) of left lower extremity, unspecified vein (H)       * order for DME     1 Bottle    Plain  "packing strip 1/4\"    Abscess of leg       * order for DME     1 Box    Sterile zoey tipped applicators    Abscess of leg       * order for DME     3 Bottle    USE 1/4 INCH  WIDTH PLAIN NU GAUZE PACKING TO DO SELF WOUND CARE OF LEFT LOWER LEG ONCE DAILY. DIMENSIONS OF WOUND PRESENTLY ARE 2 X 2 INCH AND APPROXIMATELY 1/2 INCH DEEP.  USES 6 INCHES OF PACKING CURRENTLY FOR DRESSING CHANGE.  FAX TO [facility] [phone]    Wound of left leg       * order for DME     30 pad    STERILE ADHESIVE PAD BANDAGE AT LEAST 4X4 INCH IN SIZE NEEDED FOR DAILY WOUND CARE TO LEFT LOWER LEG. FAX TO [facility] [phone]    Leg wound, left       polyethylene glycol Packet    MIRALAX/GLYCOLAX    7 packet    Take 17 g by mouth daily    Mass of pelvis       PRO-BIOTIC BLEND PO      Take 1 capsule by mouth daily Enzymatic Therapy-probiotic pearls        prochlorperazine 10 MG tablet    COMPAZINE    30 tablet    Take 1 tablet (10 mg) by mouth every 6 hours as needed (nausea/vomiting)    Ovarian cancer, left (H), Encounter for long-term (current) use of medications       rivaroxaban ANTICOAGULANT 20 MG Tabs tablet    XARELTO    30 tablet    Take 1 tablet (20 mg) by mouth daily (with dinner)    Long-term (current) use of anticoagulants, Acute deep vein thrombosis (DVT) of left lower extremity, unspecified vein (H)       senna-docusate 8.6-50 MG per tablet    SENOKOT-S;PERICOLACE    100 tablet    Take 2 tablets by mouth 2 times daily Start with 1 tablet PO BID, If no bowel movement in 24 hours, increase to 2 tablets PO BID.  Hold for loose stools.    Mass of pelvis       traZODone 50 MG tablet    DESYREL    180 tablet    TAKE 1 TO 2 TABLETS(50  MG) BY MOUTH EVERY NIGHT AS NEEDED FOR SLEEP    Insomnia, unspecified type       vitamin B complex with vitamin C Tabs tablet      Take 1 tablet by mouth daily        vitamin D 1000 UNITS capsule      Take 2,000 Units by mouth daily        VYVANSE PO      Take 50 mg by mouth daily "        * Notice:  This list has 8 medication(s) that are the same as other medications prescribed for you. Read the directions carefully, and ask your doctor or other care provider to review them with you.

## 2017-10-18 NOTE — PATIENT INSTRUCTIONS
Contact Numbers    [facility] Main Line: [phone]  [facility] Triage:  [phone]    Call triage with chills and/or temperature greater than or equal to 100.5, uncontrolled nausea/vomiting, diarrhea, constipation, dizziness, shortness of breath, chest pain, bleeding, unexplained bruising, or any new/concerning symptoms, questions/concerns.     If you are having any concerning symptoms or wish to speak to a provider before your next infusion visit, please call your care coordinator or triage to notify them so we can adequately serve you.       After Hours: [phone]    If after hours, weekends, or holidays, call main hospital  and ask for Oncology doctor on call.         October 2017 Sunday Monday Tuesday Wednesday Thursday Friday Saturday   1     2     3     4     5     6     UMP NEW WITH ROOM   11:45 AM   (75 min.)   Mariam Sams GC   [facility]     [facility] DRAW    1:15 PM   (15 min.)   [facility] DRAW   [facility] Lab Draw 7       8     9     10     11     12     13     14       15     16     [facility] DRAW    7:45 AM   (15 min.)    [facility] LAB DRAW   [facility] Lab Draw     UMP RETURN ACTIVE TREATMENT    8:05 AM   (40 min.)   Augustina Noriega, MARLON CNP   [facility] ED LWR EXT VENOUS DUPLEX BILAT    1:30 PM   (60 min.)   UCUSV1   [facility]  17     18     UMP ONC INFUSION 240    7:00 AM   (240 min.)   UC ONCOLOGY INFUSION   [facility] 19     UMP RETURN WITH ROOM   11:45 AM   (60 min.)   Lola Vasquez, PhD [facility] 20     21       22     23     UMP NEW    3:00 PM   (60 min.)   Lola Vasquez, PhD    [facility]  24     25     26     27     28       29     30     31                                    November 2017 Sunday Monday Tuesday Wednesday Thursday Friday Saturday                  1     2     3     UMP RETURN WITH ROOM    1:00 PM   (60 min.)    Mariam Sams GC   [facility] 4       5     6     7     8     9     10     11       12     13     14     CT CHEST/ABDOMEN/PELVIS W    9:45 AM   (20 min.)   UCCT1   [facility] CT 15     16     17     18       19     20     UMP RETURN    2:05 PM   (20 min.)   Jarod Shaffer MD   [facility] 21     22     23     24     25       26     27     28     29     30                         No results found for this or any previous visit (from the past 24 hour(s)).

## 2017-10-19 ENCOUNTER — ALLIED HEALTH/NURSE VISIT (OUTPATIENT)
Dept: ONCOLOGY | Facility: CLINIC | Age: 62
End: 2017-10-19
Attending: OBSTETRICS & GYNECOLOGY
Payer: COMMERCIAL

## 2017-10-19 ENCOUNTER — OFFICE VISIT (OUTPATIENT)
Dept: PSYCHOLOGY | Facility: CLINIC | Age: 62
End: 2017-10-19
Attending: PSYCHOLOGIST
Payer: COMMERCIAL

## 2017-10-19 VITALS
BODY MASS INDEX: 32.89 KG/M2 | HEART RATE: 101 BPM | DIASTOLIC BLOOD PRESSURE: 75 MMHG | SYSTOLIC BLOOD PRESSURE: 118 MMHG | TEMPERATURE: 98.2 F | RESPIRATION RATE: 18 BRPM | OXYGEN SATURATION: 95 % | WEIGHT: 185.6 LBS

## 2017-10-19 DIAGNOSIS — D70.1 CHEMOTHERAPY-INDUCED NEUTROPENIA (H): Primary | ICD-10-CM

## 2017-10-19 DIAGNOSIS — Z79.899 ENCOUNTER FOR LONG-TERM (CURRENT) USE OF MEDICATIONS: ICD-10-CM

## 2017-10-19 DIAGNOSIS — T45.1X5A CHEMOTHERAPY-INDUCED NEUTROPENIA (H): Primary | ICD-10-CM

## 2017-10-19 DIAGNOSIS — C56.2 OVARIAN CANCER, LEFT (H): ICD-10-CM

## 2017-10-19 DIAGNOSIS — F43.20 ADJUSTMENT DISORDER, UNSPECIFIED TYPE: Primary | ICD-10-CM

## 2017-10-19 PROCEDURE — 96372 THER/PROPH/DIAG INJ SC/IM: CPT

## 2017-10-19 PROCEDURE — 25000128 H RX IP 250 OP 636: Mod: ZF | Performed by: NURSE PRACTITIONER

## 2017-10-19 PROCEDURE — 90834 PSYTX W PT 45 MINUTES: CPT | Mod: ZP | Performed by: PSYCHOLOGIST

## 2017-10-19 RX ADMIN — PEGFILGRASTIM 6 MG: 6 INJECTION SUBCUTANEOUS at 11:17

## 2017-10-19 NOTE — MR AVS SNAPSHOT
After Visit Summary   10/19/2017    Hafsa Corona    MRN: [id number]           Patient Information     Date Of Birth          1955        Visit Information        Provider Department      10/19/2017 11:00 AM Nurse, [facility] Injection [facility]        Today's Diagnoses     Chemotherapy-induced neutropenia (H)    -  1    Ovarian cancer, left (H)        Encounter for long-term (current) use of medications           Follow-ups after your visit        Your next 10 appointments already scheduled     Oct 19, 2017 12:00 PM CDT   (Arrive by 11:45 AM)   RETURN WITH ROOM with Lola Vasquez, PhD BENJAMIN,  2 118 CONSULT [facility] ([facility])    [address]  2nd Floor  [facility] [id number] [phone]            Oct 23, 2017  3:00 PM CDT   New Patient Visit with Lola Vasquez, PhD BENJAMIN   [facility]  ([facility])    [address]  [address] [address]   [phone]            Nov 03, 2017  1:15 PM CDT   (Arrive by 1:00 PM)   RETURN WITH ROOM with Mariam Sams GC,  2 116 CONSULT [facility] ([facility])    [address]  2nd [facility] [address]   [phone]            Nov 14, 2017 10:00 AM CST   (Arrive by 9:45 AM)   CT CHEST/ABDOMEN/PELVIS W CONTRAST with UCCT1   [facility] [place] [facility] ([facility])    [address]  1st Floor  [facility] [address] [phone]           Please bring any scans or X-rays taken at other hospitals, if similar tests were done. Also bring a list of your medicines, including vitamins, minerals and over-the-counter drugs. It is safest to leave personal items at home.  Be sure to tell your doctor:   If you have any allergies.   If there s any chance you are pregnant.   If you are breastfeeding.   If you  have any special needs.  You may have contrast for this exam. To prepare:   Do not eat or drink for 2 hours before your exam. If you need to take medicine, you may take it with small sips of water. (We may ask you to take liquid medicine as well.)   The day before your exam, drink extra fluids at least six 8-ounce glasses (unless your doctor tells you to restrict your fluids).  Patients over 70 or patients with diabetes or kidney problems:   If you haven t had a blood test (creatinine test) within the last 30 days, go to your clinic or Diagnostic Imaging Department for this test.  If you have diabetes:   If your kidney function is normal, continue taking your metformin (Avandamet, Glucophage, Glucovance, Metaglip) on the day of your exam.   If your kidney function is abnormal, wait 48 hours before restarting this medicine.  You will have oral contrast for this exam:   You will drink the contrast at home. Get this from your clinic or Diagnostic Imaging Department. Please follow the directions given.  Please wear loose clothing, such as a sweat suit or jogging clothes. Avoid snaps, zippers and other metal. We may ask you to undress and put on a hospital gown.  If you have any questions, please call the Imaging Department where you will have your exam.            Nov 20, 2017  2:20 PM CST   (Arrive by 2:05 PM)   Return Visit with Jarod Shaffer MD   [facility] ([facility])    [address] [place] [address] [phone]              Who to contact     If you have questions or need follow up information about today's clinic visit or your schedule please contact [facility] directly at [phone].  Normal or non-critical lab and imaging results will be communicated to you by MyChart, letter or phone within 4 business days after the clinic has received the results. If you do not hear from us within 7 days, please contact  "the clinic through [employer] or phone. If you have a critical or abnormal lab result, we will notify you by phone as soon as possible.  Submit refill requests through [employer] or call your pharmacy and they will forward the refill request to us. Please allow 3 business days for your refill to be completed.          Additional Information About Your Visit        [employer] Information     [employer]oneer lets you send messages to your doctor, view your test results, renew your prescriptions, schedule appointments and more. To sign up, go to [email or web address] . Click on \"Log in\" on the left side of the screen, which will take you to the Welcome page. Then click on \"Sign up Now\" on the right side of the page.     You will be asked to enter the access code listed below, as well as some personal information. Please follow the directions to create your username and password.     Your access code is: [email or web address]  Expires: 2018  9:38 AM     Your access code will  in 90 days. If you need help or a new code, please call your [facility] clinic or [phone].        Care EveryWhere ID     This is your Care EveryWhere ID. This could be used by other organizations to access your [facility] medical records  [id number]        Your Vitals Were     Pulse Temperature Respirations Last Period Pulse Oximetry BMI (Body Mass Index)    101 98.2  F (36.8  C) (Oral) 18 2009 95% 32.89 kg/m2       Blood Pressure from Last 3 Encounters:   10/19/17 118/75   10/18/17 125/81   10/16/17 111/74    Weight from Last 3 Encounters:   10/19/17 84.2 kg (185 lb 9.6 oz)   10/16/17 84.2 kg (185 lb 11.2 oz)   17 83 kg (182 lb 14.4 oz)              Today, you had the following     No orders found for display         Today's Medication Changes          These changes are accurate as of: 10/19/17 11:23 AM.  If you have any questions, ask your nurse or doctor.               These medicines have changed or have updated prescriptions.        " Dose/Directions    metFORMIN 500 MG 24 hr tablet   Commonly known as:  GLUCOPHAGE-XR   This may have changed:    - how much to take  - when to take this  - additional instructions   Used for:  Type 2 diabetes mellitus without complication, without long-term current use of insulin (H)        Take two tabs by mouth once daily with evening meal.   Quantity:  180 tablet   Refills:  PRN                Primary Care Provider Office Phone # Fax #    Morelia AyonATTILA [phone] [address] [facility] [address]        Equal Access to Services     [facility]: Hadii aad ku hadasho Soomaali, waaxda luqadaha, qaybta kaalmada adeegyafrancesca, waxbrent betts . So [facility] [phone].    ATENCIÓN: Si habla español, tiene a martínez disposición servicios gratuitos de asistencia lingüística. [facility] [phone].    We comply with applicable federal civil rights laws and [place] laws. We do not discriminate on the basis of race, color, national origin, age, disability, sex, sexual orientation, or gender identity.            Thank you!     Thank you for choosing [facility]  for your care. Our goal is always to provide you with excellent care. Hearing back from our patients is one way we can continue to improve our services. Please take a few minutes to complete the written survey that you may receive in the mail after your visit with us. Thank you!             Your Updated Medication List - Protect others around you: Learn how to safely use, store and throw away your medicines at www.disposemymeds.org.          This list is accurate as of: 10/19/17 11:23 AM.  Always use your most recent med list.                   Brand Name Dispense Instructions for use Diagnosis    acetaminophen 325 MG tablet    TYLENOL    100 tablet    Take 2 tablets (650 mg) by mouth every 6 hours as needed for mild pain    Cellulitis and abscess of leg, Mass of pelvis       atorvastatin 80 MG  tablet    LIPITOR    90 tablet    Take 1 tablet (80 mg) by mouth daily    Hyperlipidemia LDL goal <100       CALCIUM-MAGNESIUM-VITAMIN D PO      Take 2 tablets by mouth daily        * dexamethasone 4 MG tablet    DECADRON    2 tablet    Take 2 tablets (8 mg) by mouth every morning    Ovarian cancer, left (H), Encounter for long-term (current) use of medications       * dexamethasone 4 MG tablet    DECADRON    10 tablet    TAKE 5 TABLETS BY MOUTH TWICE  A DAY ( AT 12 HOURS AND 6 HOURS PRIOR TO TAXOL)    Ovarian cancer, left (H), Encounter for long-term (current) use of medications       * dexamethasone 4 MG tablet    DECADRON    12 tablet    Take 2 tablets (8 mg) by mouth 2 times daily (with meals) for 3 days Start the day before docetaxel (Taxotere) is given.    Chemotherapy-induced neutropenia (H), Ovarian cancer, left (H), Encounter for long-term (current) use of medications       EPINEPHrine 0.3 MG/0.3ML injection 2-pack    EPIPEN/ADRENACLICK/or ANY BX GENERIC EQUIV    1 each    Inject 0.3 mLs (0.3 mg) into the muscle once as needed for anaphylaxis    Bee allergy status       escitalopram 20 MG tablet    LEXAPRO    30 tablet    Take 1 tablet (20 mg) by mouth daily    Major depressive disorder, recurrent episode, mild (H)       fluticasone 50 MCG/ACT spray    FLONASE    48 mL    SHAKE WELL AND USE 2 SPRAYS IN EACH NOSTRIL DAILY    Chronic maxillary sinusitis       gabapentin 100 MG capsule    NEURONTIN    42 capsule    Take 1-2 capsules (100-200 mg) by mouth 3 times daily Start the day after chemo for one week    Pain in joint, multiple sites       L-GLUTAMINE PO      Take 2 g by mouth 2 times daily        lisinopril 10 MG tablet    PRINIVIL/ZESTRIL    90 tablet    Take 1 tablet (10 mg) by mouth daily    Essential hypertension       loratadine 10 MG tablet    CLARITIN    30 tablet    Take 1 tablet (10 mg) by mouth daily    Pain in joint, multiple sites       LORazepam 1 MG tablet    ATIVAN    30 tablet    Take 1  "tablet (1 mg) by mouth every 6 hours as needed (nausea/vomiting, anxiety or sleep)    Ovarian cancer, left (H), Encounter for long-term (current) use of medications       metFORMIN 500 MG 24 hr tablet    GLUCOPHAGE-XR    180 tablet    Take two tabs by mouth once daily with evening meal.    Type 2 diabetes mellitus without complication, without long-term current use of insulin (H)       MULTIVITAMIN ADULT PO      Take 1 tablet by mouth daily        nystatin [id number] UNIT/ML suspension    MYCOSTATIN    473 mL    Take 5 mLs (500,000 Units) by mouth 4 times daily Swish and swallow 4 times daily- nothing to eat or drink 30 minutes afterward    Thrush       order for DME     1 Units    Equipment being ordered: blood pressure monitor    Essential hypertension       * order for DME     1 each    Compression stockings 20-30 mm Hg. To be wear when directed by Hematology team and while awake for the first two years post clot.    Long-term (current) use of anticoagulants, Acute deep vein thrombosis (DVT) of left lower extremity, unspecified vein (H)       * order for DME     1 Bottle    Plain packing strip 1/4\"    Abscess of leg       * order for DME     1 Box    Sterile zoey tipped applicators    Abscess of leg       * order for DME     3 Bottle    USE 1/4 INCH  WIDTH PLAIN NU GAUZE PACKING TO DO SELF WOUND CARE OF LEFT LOWER LEG ONCE DAILY. DIMENSIONS OF WOUND PRESENTLY ARE 2 X 2 INCH AND APPROXIMATELY 1/2 INCH DEEP.  USES 6 INCHES OF PACKING CURRENTLY FOR DRESSING CHANGE.  FAX TO [facility] [phone]    Wound of left leg       * order for DME     30 pad    STERILE ADHESIVE PAD BANDAGE AT LEAST 4X4 INCH IN SIZE NEEDED FOR DAILY WOUND CARE TO LEFT LOWER LEG. FAX TO [facility] [phone]    Leg wound, left       polyethylene glycol Packet    MIRALAX/GLYCOLAX    7 packet    Take 17 g by mouth daily    Mass of pelvis       PRO-BIOTIC BLEND PO      Take 1 capsule by mouth daily Enzymatic Therapy-probiotic pearls    "     prochlorperazine 10 MG tablet    COMPAZINE    30 tablet    Take 1 tablet (10 mg) by mouth every 6 hours as needed (nausea/vomiting)    Ovarian cancer, left (H), Encounter for long-term (current) use of medications       rivaroxaban ANTICOAGULANT 20 MG Tabs tablet    XARELTO    30 tablet    Take 1 tablet (20 mg) by mouth daily (with dinner)    Long-term (current) use of anticoagulants, Acute deep vein thrombosis (DVT) of left lower extremity, unspecified vein (H)       senna-docusate 8.6-50 MG per tablet    SENOKOT-S;PERICOLACE    100 tablet    Take 2 tablets by mouth 2 times daily Start with 1 tablet PO BID, If no bowel movement in 24 hours, increase to 2 tablets PO BID.  Hold for loose stools.    Mass of pelvis       traZODone 50 MG tablet    DESYREL    180 tablet    TAKE 1 TO 2 TABLETS(50  MG) BY MOUTH EVERY NIGHT AS NEEDED FOR SLEEP    Insomnia, unspecified type       vitamin B complex with vitamin C Tabs tablet      Take 1 tablet by mouth daily        vitamin D 1000 UNITS capsule      Take 2,000 Units by mouth daily        VYVANSE PO      Take 50 mg by mouth daily        * Notice:  This list has 8 medication(s) that are the same as other medications prescribed for you. Read the directions carefully, and ask your doctor or other care provider to review them with you.

## 2017-10-19 NOTE — PATIENT INSTRUCTIONS
October 2017 Sunday Monday Tuesday Wednesday Thursday Friday Saturday   1     2     3     4     5     6     [facility] NEW WITH ROOM   11:45 AM   (75 min.)   Mariam Sams GC   [facility]     [facility] DRAW    1:15 PM   (15 min.)    [facility] DRAW   [facility] Lab Draw 7       8     9     10     11     12     13     14       15     16     [facility] DRAW    7:45 AM   (15 min.)    [facility] DRAW   [facility] Lab Draw     [facility] RETURN ACTIVE TREATMENT    8:05 AM   (40 min.)   Augustina Noriega APRN CNP   [facility]     US LWR EXT VENOUS DUPLEX BILAT    1:30 PM   (60 min.)   UCUSV1   [facility] US 17     18     [facility] ONC INFUSION 240    7:00 AM   (240 min.)    ONCOLOGY INFUSION   [facility] 19     [facility] INJECTION   10:45 AM   (30 min.)   Nurse,  Oncology Injection   [facility]     [facility] RETURN WITH ROOM   11:45 AM   (60 min.)   Lola Vasquez, PhD [facility] 20     21       22     23     [facility] NEW    3:00 PM   (60 min.)   Lola Vasquez, PhD    [facility]  24     25     26     27     28       29     30     31                                    November 2017 Sunday Monday Tuesday Wednesday Thursday Friday Saturday                  1     2     3     [facility] RETURN WITH ROOM    1:00 PM   (60 min.)   Mariam Sams GC   [facility] 4       5     6     7     8     9     [facility] RETURN WITH ROOM   11:45 AM   (60 min.)   Lola Vasquez, PhD [facility] 10     11       12     13     14     CT CHEST/ABDOMEN/PELVIS W    9:45 AM   (20 min.)   UCCT1   [facility] CT 15     16     [facility] RETURN WITH ROOM    1:45 PM   (60 min.)   Lola Vasquez, PhD [facility] 17     18       19     20     [facility] RETURN    2:05 PM   (20 min.)   Jarod Shaffer MD   [facility] 21     22     23      24     25       26     27     28     29     30     [facility] RETURN WITH ROOM   11:45 AM   (60 min.)   Lola Vasquez, PhD [facility]                      December 2017 Sunday Monday Tuesday Wednesday Thursday Friday Saturday                            1     2       3     4     5     6     7     [facility] RETURN WITH ROOM   11:45 AM   (60 min.)   Lola Vasquez, PhD BENJAMIN   [facility] 8     9       10     11     12     13     14     [facility] RETURN WITH ROOM   11:45 AM   (60 min.)   Lola Vasquez, PhD [facility] 15     16       17     18     19     20     21     22     23       24     25     26     27     28     29     30       31
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
## 2017-10-19 NOTE — MR AVS SNAPSHOT
After Visit Summary   10/19/2017    Hafsa Corona    MRN: [id number]           Patient Information     Date Of Birth          1955        Visit Information        Provider Department      10/19/2017 12:00 PM Lola Vasquez, PhD BENJAMIN;  2 118 CONSULT RM [facility]        Care Instructions          October 2017 Sunday Monday Tuesday Wednesday Thursday Friday Saturday   1     2     3     4     5     6     [facility] NEW WITH ROOM   11:45 AM   (75 min.)   Mariam Sams GC   [facility]     [facility] LAB DRAW    1:15 PM   (15 min.)   [facility] DRAW   [facility] Lab Draw 7       8     9     10     11     12     13     14       15     16     [facility] DRAW    7:45 AM   (15 min.)    [facility] LAB DRAW   [facility] Lab Draw     UMP RETURN ACTIVE TREATMENT    8:05 AM   (40 min.)   Augustina Noriega, MARLON CNP   [facility]     US LWR EXT VENOUS DUPLEX BILAT    1:30 PM   (60 min.)   UCUSV1   [facility]  17     18     UMP ONC INFUSION 240    7:00 AM   (240 min.)    ONCOLOGY INFUSION   [facility] 19     UMP INJECTION   10:45 AM   (30 min.)   Nurse,  Oncology Injection   [facility]     UMP RETURN WITH ROOM   11:45 AM   (60 min.)   Lola Vasquez, PhD [facility] 20     21       22     23     UMP NEW    3:00 PM   (60 min.)   Lola Vasquez, PhD BENJAMIN   [facility]  24     25     26     27     28       29     30     31 November 2017 Sunday Monday Tuesday Wednesday Thursday Friday Saturday                  1     2     3     UMP RETURN WITH ROOM    1:00 PM   (60 min.)   Mariam Sams GC   [facility] 4       5     6     7     8     9     UMP RETURN WITH ROOM   11:45 AM   (60 min.)   Lola Vasquez, PhD [facility] 10     11       12     13     14     CT  CHEST/ABDOMEN/PELVIS W    9:45 AM   (20 min.)   UCCT1   [facility] [place] [facility] 15     16     UMP RETURN WITH ROOM    1:45 PM   (60 min.)   Lola Vasquez, PhD ALEXANDER   [facility] 17     18       19     20     UMP RETURN    2:05 PM   (20 min.)   Jarod Shaffer MD   [facility] 21     22     23     24     25       26     27     28     29     30     UMP RETURN WITH ROOM   11:45 AM   (60 min.)   Lola Vasquez, PhD ALEXANDER   [facility]                      December 2017 Sunday Monday Tuesday Wednesday Thursday Friday Saturday                            1     2       3     4     5     6     7     UMP RETURN WITH ROOM   11:45 AM   (60 min.)   Lola Vasquez PhD LP   [facility] 8     9       10     11     12     13     14     UMP RETURN WITH ROOM   11:45 AM   (60 min.)   Lola Vasquez PhD LP   [facility] 15     16       17     18     19     20     21     22     23       24     25     26     27     28     29     30       31                                                    Follow-ups after your visit        Your next 10 appointments already scheduled     Oct 23, 2017  3:00 PM CDT   New Patient Visit with Lola Vasquez PhD LP   [facility]  ([facility])    [address]  [address] [place] [id number]   [phone]            Nov 03, 2017  1:15 PM CDT   (Arrive by 1:00 PM)   RETURN WITH ROOM with Mariam Sams GC, UC 2 116 CONSULT [facility] ([facility] [place])    [address] [place] [id number] [phone]            Nov 09, 2017 12:00 PM CST   (Arrive by 11:45 AM)   RETURN WITH ROOM with Lola Vasquez PhD LP, UC 2 114 CONSULT [facility] ([facility] [place])    [address] [place] [id number] [phone]             Nov 14, 2017 10:00 AM CST   (Arrive by 9:45 AM)   CT CHEST/ABDOMEN/PELVIS W CONTRAST with UCCT1   [facility] [place] ([facility] [place])    [address] [facility]  [address]st [facility] [address] [phone]           Please bring any scans or X-rays taken at other hospitals, if similar tests were done. Also bring a list of your medicines, including vitamins, minerals and over-the-counter drugs. It is safest to leave personal items at home.  Be sure to tell your doctor:   If you have any allergies.   If there s any chance you are pregnant.   If you are breastfeeding.   If you have any special needs.  You may have contrast for this exam. To prepare:   Do not eat or drink for 2 hours before your exam. If you need to take medicine, you may take it with small sips of water. (We may ask you to take liquid medicine as well.)   The day before your exam, drink extra fluids at least six 8-ounce glasses (unless your doctor tells you to restrict your fluids).  Patients over 70 or patients with diabetes or kidney problems:   If you haven t had a blood test (creatinine test) within the last 30 days, go to your clinic or Diagnostic Imaging Department for this test.  If you have diabetes:   If your kidney function is normal, continue taking your metformin (Avandamet, Glucophage, Glucovance, Metaglip) on the day of your exam.   If your kidney function is abnormal, wait 48 hours before restarting this medicine.  You will have oral contrast for this exam:   You will drink the contrast at home. Get this from your clinic or Diagnostic Imaging Department. Please follow the directions given.  Please wear loose clothing, such as a sweat suit or jogging clothes. Avoid snaps, zippers and other metal. We may ask you to undress and put on a hospital gown.  If you have any questions, please call the Imaging Department where you will have your exam.            Nov 16, 2017  2:00 PM CST   (Arrive by 1:45 PM)  "  RETURN WITH ROOM with Lola Vasquez, PhD LP,  2 114 CONSULT RM   [facility] ([facility])    [address] [facility]  [address] [facility] [id number] [phone]            Nov 20, 2017  2:20 PM CST   (Arrive by 2:05 PM)   Return Visit with Jarod Shaffer MD   [facility] ([facility])    [address] [place] [id number] [phone]            Nov 30, 2017 12:00 PM CST   (Arrive by 11:45 AM)   RETURN WITH ROOM with Lola Vasquez PhD BENJAMIN   [facility] ([facility])    [address] [place] [id number] [phone]              Who to contact     If you have questions or need follow up information about today's clinic visit or your schedule please contact [facility] directly at [phone].  Normal or non-critical lab and imaging results will be communicated to you by SeeOnhart, letter or phone within 4 business days after the clinic has received the results. If you do not hear from us within 7 days, please contact the clinic through Jumping Nutst or phone. If you have a critical or abnormal lab result, we will notify you by phone as soon as possible.  Submit refill requests through SterraClimb or call your pharmacy and they will forward the refill request to us. Please allow 3 business days for your refill to be completed.          Additional Information About Your Visit        SterraClimb Information     SterraClimb lets you send messages to your doctor, view your test results, renew your prescriptions, schedule appointments and more. To sign up, go to [email or web address] . Click on \"Log in\" on the left side of the screen, which will take you to the Welcome page. Then click on \"Sign up Now\" on the right side of the page.     You will be asked to enter the access code listed below, as well as some personal " information. Please follow the directions to create your username and password.     Your access code is: [email or web address]  Expires: 2018  9:38 AM     Your access code will  in 90 days. If you need help or a new code, please call your [facility] clinic or [phone].        Care EveryWhere ID     This is your Care EveryWhere ID. This could be used by other organizations to access your [facility] medical records  [id number]        Your Vitals Were     Last Period                   2009            Blood Pressure from Last 3 Encounters:   10/19/17 118/75   10/18/17 125/81   10/16/17 111/74    Weight from Last 3 Encounters:   10/19/17 84.2 kg (185 lb 9.6 oz)   10/16/17 84.2 kg (185 lb 11.2 oz)   17 83 kg (182 lb 14.4 oz)              Today, you had the following     No orders found for display         Today's Medication Changes          These changes are accurate as of: 10/19/17  1:50 PM.  If you have any questions, ask your nurse or doctor.               These medicines have changed or have updated prescriptions.        Dose/Directions    metFORMIN 500 MG 24 hr tablet   Commonly known as:  GLUCOPHAGE-XR   This may have changed:    - how much to take  - when to take this  - additional instructions   Used for:  Type 2 diabetes mellitus without complication, without long-term current use of insulin (H)        Take two tabs by mouth once daily with evening meal.   Quantity:  180 tablet   Refills:  PRN                Primary Care Provider Office Phone # Fax #    Morelia Ayon [phone] [address] [facility] [address]        Equal Access to Services     [facility] Center[facility] AH: Hadii serena tellez Soaxel, waaxda luqadaha, qaybta kaalmasujatha otoole. So [facility] [phone].    ATENCIÓN: Si habla español, tiene a martínez disposición servicios gratuitos de asistencia lingüística. Llame al [phone].    We comply with applicable federal civil  rights laws and [place] laws. We do not discriminate on the basis of race, color, national origin, age, disability, sex, sexual orientation, or gender identity.            Thank you!     Thank you for choosing [facility]  for your care. Our goal is always to provide you with excellent care. Hearing back from our patients is one way we can continue to improve our services. Please take a few minutes to complete the written survey that you may receive in the mail after your visit with us. Thank you!             Your Updated Medication List - Protect others around you: Learn how to safely use, store and throw away your medicines at www.disposemymeds.org.          This list is accurate as of: 10/19/17  1:50 PM.  Always use your most recent med list.                   Brand Name Dispense Instructions for use Diagnosis    acetaminophen 325 MG tablet    TYLENOL    100 tablet    Take 2 tablets (650 mg) by mouth every 6 hours as needed for mild pain    Cellulitis and abscess of leg, Mass of pelvis       atorvastatin 80 MG tablet    LIPITOR    90 tablet    Take 1 tablet (80 mg) by mouth daily    Hyperlipidemia LDL goal <100       CALCIUM-MAGNESIUM-VITAMIN D PO      Take 2 tablets by mouth daily        * dexamethasone 4 MG tablet    DECADRON    2 tablet    Take 2 tablets (8 mg) by mouth every morning    Ovarian cancer, left (H), Encounter for long-term (current) use of medications       * dexamethasone 4 MG tablet    DECADRON    10 tablet    TAKE 5 TABLETS BY MOUTH TWICE  A DAY ( AT 12 HOURS AND 6 HOURS PRIOR TO TAXOL)    Ovarian cancer, left (H), Encounter for long-term (current) use of medications       * dexamethasone 4 MG tablet    DECADRON    12 tablet    Take 2 tablets (8 mg) by mouth 2 times daily (with meals) for 3 days Start the day before docetaxel (Taxotere) is given.    Chemotherapy-induced neutropenia (H), Ovarian cancer, left (H), Encounter for long-term (current) use of medications        EPINEPHrine 0.3 MG/0.3ML injection 2-pack    EPIPEN/ADRENACLICK/or ANY BX GENERIC EQUIV    1 each    Inject 0.3 mLs (0.3 mg) into the muscle once as needed for anaphylaxis    Bee allergy status       escitalopram 20 MG tablet    LEXAPRO    30 tablet    Take 1 tablet (20 mg) by mouth daily    Major depressive disorder, recurrent episode, mild (H)       fluticasone 50 MCG/ACT spray    FLONASE    48 mL    SHAKE WELL AND USE 2 SPRAYS IN EACH NOSTRIL DAILY    Chronic maxillary sinusitis       gabapentin 100 MG capsule    NEURONTIN    42 capsule    Take 1-2 capsules (100-200 mg) by mouth 3 times daily Start the day after chemo for one week    Pain in joint, multiple sites       L-GLUTAMINE PO      Take 2 g by mouth 2 times daily        lisinopril 10 MG tablet    PRINIVIL/ZESTRIL    90 tablet    Take 1 tablet (10 mg) by mouth daily    Essential hypertension       loratadine 10 MG tablet    CLARITIN    30 tablet    Take 1 tablet (10 mg) by mouth daily    Pain in joint, multiple sites       LORazepam 1 MG tablet    ATIVAN    30 tablet    Take 1 tablet (1 mg) by mouth every 6 hours as needed (nausea/vomiting, anxiety or sleep)    Ovarian cancer, left (H), Encounter for long-term (current) use of medications       metFORMIN 500 MG 24 hr tablet    GLUCOPHAGE-XR    180 tablet    Take two tabs by mouth once daily with evening meal.    Type 2 diabetes mellitus without complication, without long-term current use of insulin (H)       MULTIVITAMIN ADULT PO      Take 1 tablet by mouth daily        nystatin 789897 UNIT/ML suspension    MYCOSTATIN    473 mL    Take 5 mLs (500,000 Units) by mouth 4 times daily Swish and swallow 4 times daily- nothing to eat or drink 30 minutes afterward    Thrush       order for DME     1 Units    Equipment being ordered: blood pressure monitor    Essential hypertension       * order for DME     1 each    Compression stockings 20-30 mm Hg. To be wear when directed by Hematology team and while awake for  "the first two years post clot.    Long-term (current) use of anticoagulants, Acute deep vein thrombosis (DVT) of left lower extremity, unspecified vein (H)       * order for DME     1 Bottle    Plain packing strip 1/4\"    Abscess of leg       * order for DME     1 Box    Sterile zoey tipped applicators    Abscess of leg       * order for DME     3 Bottle    USE 1/4 INCH  WIDTH PLAIN NU GAUZE PACKING TO DO SELF WOUND CARE OF LEFT LOWER LEG ONCE DAILY. DIMENSIONS OF WOUND PRESENTLY ARE 2 X 2 INCH AND APPROXIMATELY 1/2 INCH DEEP.  USES 6 INCHES OF PACKING CURRENTLY FOR DRESSING CHANGE.  FAX TO [facility] [phone]    Wound of left leg       * order for DME     30 pad    STERILE ADHESIVE PAD BANDAGE AT LEAST 4X4 INCH IN SIZE NEEDED FOR DAILY WOUND CARE TO LEFT LOWER LEG. FAX TO [employer] [phone]    Leg wound, left       polyethylene glycol Packet    MIRALAX/GLYCOLAX    7 packet    Take 17 g by mouth daily    Mass of pelvis       PRO-BIOTIC BLEND PO      Take 1 capsule by mouth daily Enzymatic Therapy-probiotic pearls        prochlorperazine 10 MG tablet    COMPAZINE    30 tablet    Take 1 tablet (10 mg) by mouth every 6 hours as needed (nausea/vomiting)    Ovarian cancer, left (H), Encounter for long-term (current) use of medications       rivaroxaban ANTICOAGULANT 20 MG Tabs tablet    XARELTO    30 tablet    Take 1 tablet (20 mg) by mouth daily (with dinner)    Long-term (current) use of anticoagulants, Acute deep vein thrombosis (DVT) of left lower extremity, unspecified vein (H)       senna-docusate 8.6-50 MG per tablet    SENOKOT-S;PERICOLACE    100 tablet    Take 2 tablets by mouth 2 times daily Start with 1 tablet PO BID, If no bowel movement in 24 hours, increase to 2 tablets PO BID.  Hold for loose stools.    Mass of pelvis       traZODone 50 MG tablet    DESYREL    180 tablet    TAKE 1 TO 2 TABLETS(50  MG) BY MOUTH EVERY NIGHT AS NEEDED FOR SLEEP    Insomnia, unspecified type       " vitamin B complex with vitamin C Tabs tablet      Take 1 tablet by mouth daily        vitamin D 1000 UNITS capsule      Take 2,000 Units by mouth daily        VYVANSE PO      Take 50 mg by mouth daily        * Notice:  This list has 8 medication(s) that are the same as other medications prescribed for you. Read the directions carefully, and ask your doctor or other care provider to review them with you.

## 2017-10-19 NOTE — PROGRESS NOTES
Patient arrived to clinic for a Neulasta injection today.  Patient declined to speak with an RN today.  Neulasta injection given to right arm without incident.  Patient will return for next appointment.    AVS printed for patient today for future appointments.

## 2017-10-23 ENCOUNTER — TELEPHONE (OUTPATIENT)
Dept: ONCOLOGY | Facility: CLINIC | Age: 62
End: 2017-10-23

## 2017-10-23 ENCOUNTER — TELEPHONE (OUTPATIENT)
Dept: FAMILY MEDICINE | Facility: CLINIC | Age: 62
End: 2017-10-23

## 2017-10-23 DIAGNOSIS — J01.01 ACUTE RECURRENT MAXILLARY SINUSITIS: Primary | ICD-10-CM

## 2017-10-23 DIAGNOSIS — J32.0 CHRONIC MAXILLARY SINUSITIS: ICD-10-CM

## 2017-10-23 NOTE — TELEPHONE ENCOUNTER
[facility] Telephone Triage Note    Assessment: Patient called in to triage reporting the following symptoms: sore throat, cough  dry, diarrhea starting 10/20 with 4-5 episodes in the last 24 hours, L ear pain and sore throat on 10/20 - Hafsa visited her acupuncturist and received herbs in hopes to peralta off the infectious symptoms. Symptoms progressed over the weekend to swollen glands in neck/axilla, runny nose and sinus congestion along with low energy, feeling dizzy. The ear pain has now cleared as well as the sore throat. Hafsa as remained afebrile. She notes a history of recurrent sinus infections and says Augmentin works to treat them. She also says she feels confident these symptoms are not from her chemotherapy on 10/18. Kartik with taxotere is expected 5-9 days post infusion and with carboplatin about 21 days post infusion. She also reports her acupuncturist was recently ill and she had contact with her during that time. .    Recommendations: Advised to see PCP for evaluation and treatment.  Notified RNCC Karla Oswald regarding Hafsa's symptoms. - Continue to push fluids at home and use Immodium PRN for loose stool. Monitor for fever.     Follow-Up: Patient voiced understanding of advice and/or instructions given.

## 2017-10-23 NOTE — TELEPHONE ENCOUNTER
Patient called to request amoxicillin-clavulanate (AUGMENTIN) 875-125 MG per tablet.  She has a sinus infection and her previous script has .  Please send to [facility] on [address] (loaded)    Anusha Holden [place]  2017 1:35 PM

## 2017-10-25 ENCOUNTER — CARE COORDINATION (OUTPATIENT)
Dept: ONCOLOGY | Facility: CLINIC | Age: 62
End: 2017-10-25

## 2017-10-25 NOTE — PROGRESS NOTES
Care Coordinator Note  Returned call from patient.  States she saw her PCP for a sinus infection.  She was started on Augmentin BID and is feeling better.  She had diarrhea x 5 days and is taking Kaopectate as needed.  She does not have a temp, has a dry cough, head is stuffy.  States her energy is coming back, she is eating and drinking better now.  She plans to make a follow up appointment with her PCP next week.    Informed patient I would be faxing her disability paperwork, she will  a copy when she is in clinic next week.  Reinforced to call if her symptoms do not improve or if she has any questions or concerns.    Patient verbalized back understanding of the above information discussed.     Shanon SMITH, RN  Care Coordinator  Gynecologic Cancer   Office:  [phone]  Pager: [phone] #[phone]

## 2017-11-01 ENCOUNTER — OFFICE VISIT (OUTPATIENT)
Dept: FAMILY MEDICINE | Facility: CLINIC | Age: 62
End: 2017-11-01
Payer: COMMERCIAL

## 2017-11-01 VITALS
WEIGHT: 179.38 LBS | SYSTOLIC BLOOD PRESSURE: 110 MMHG | HEART RATE: 91 BPM | TEMPERATURE: 98.5 F | DIASTOLIC BLOOD PRESSURE: 74 MMHG | RESPIRATION RATE: 18 BRPM | BODY MASS INDEX: 31.78 KG/M2 | OXYGEN SATURATION: 95 %

## 2017-11-01 DIAGNOSIS — C56.1 MALIGNANT NEOPLASM OF RIGHT OVARY (H): ICD-10-CM

## 2017-11-01 DIAGNOSIS — E11.9 TYPE 2 DIABETES MELLITUS WITHOUT COMPLICATION, WITHOUT LONG-TERM CURRENT USE OF INSULIN (H): Primary | ICD-10-CM

## 2017-11-01 DIAGNOSIS — I10 ESSENTIAL HYPERTENSION: ICD-10-CM

## 2017-11-01 LAB
ALBUMIN UR-MCNC: NEGATIVE MG/DL
APPEARANCE UR: CLEAR
BILIRUB UR QL STRIP: NEGATIVE
COLOR UR AUTO: YELLOW
GLUCOSE UR STRIP-MCNC: NEGATIVE MG/DL
HGB UR QL STRIP: ABNORMAL
KETONES UR STRIP-MCNC: ABNORMAL MG/DL
LEUKOCYTE ESTERASE UR QL STRIP: NEGATIVE
MUCOUS THREADS #/AREA URNS LPF: PRESENT /LPF
NITRATE UR QL: NEGATIVE
NON-SQ EPI CELLS #/AREA URNS LPF: ABNORMAL /LPF
PH UR STRIP: 5.5 PH (ref 5–7)
RBC #/AREA URNS AUTO: ABNORMAL /HPF
SOURCE: ABNORMAL
SP GR UR STRIP: 1.02 (ref 1–1.03)
UROBILINOGEN UR STRIP-ACNC: 0.2 EU/DL (ref 0.2–1)
WBC #/AREA URNS AUTO: ABNORMAL /HPF

## 2017-11-01 PROCEDURE — 36415 COLL VENOUS BLD VENIPUNCTURE: CPT | Performed by: NURSE PRACTITIONER

## 2017-11-01 PROCEDURE — 99214 OFFICE O/P EST MOD 30 MIN: CPT | Performed by: NURSE PRACTITIONER

## 2017-11-01 PROCEDURE — 82947 ASSAY GLUCOSE BLOOD QUANT: CPT | Performed by: NURSE PRACTITIONER

## 2017-11-01 PROCEDURE — 81001 URINALYSIS AUTO W/SCOPE: CPT | Performed by: NURSE PRACTITIONER

## 2017-11-01 PROCEDURE — 82043 UR ALBUMIN QUANTITATIVE: CPT | Performed by: NURSE PRACTITIONER

## 2017-11-01 PROCEDURE — 80061 LIPID PANEL: CPT | Performed by: NURSE PRACTITIONER

## 2017-11-01 NOTE — MR AVS SNAPSHOT
After Visit Summary   11/1/2017    Hafsa Corona    MRN: [id number]           Patient Information     Date Of Birth          1955        Visit Information        Provider Department      11/1/2017 1:45 PM Morelia Ayon NP [facility]        Today's Diagnoses     Type 2 diabetes mellitus without complication, without long-term current use of insulin (H)    -  1    Essential hypertension        Malignant neoplasm of right ovary (H)           Follow-ups after your visit        Your next 10 appointments already scheduled     Nov 09, 2017 12:00 PM CST   (Arrive by 11:45 AM)   RETURN WITH ROOM with Lola Vasquez, PhD LP,  2 [address] [facility] [facility] ([facility] [place])    [address]  2nd Floor  [facility] [address] [phone]            Nov 14, 2017 10:00 AM CST   (Arrive by 9:45 AM)   CT CHEST/ABDOMEN/PELVIS W CONTRAST with UCCT1   [facility] [place] [facility] ([place] [facility])    [address]  1st Floor  [facility] [address] [phone]           Please bring any scans or X-rays taken at other hospitals, if similar tests were done. Also bring a list of your medicines, including vitamins, minerals and over-the-counter drugs. It is safest to leave personal items at home.  Be sure to tell your doctor:   If you have any allergies.   If there s any chance you are pregnant.   If you are breastfeeding.   If you have any special needs.  You may have contrast for this exam. To prepare:   Do not eat or drink for 2 hours before your exam. If you need to take medicine, you may take it with small sips of water. (We may ask you to take liquid medicine as well.)   The day before your exam, drink extra fluids at least six 8-ounce glasses (unless your doctor tells you to restrict your fluids).  Patients over 70 or patients with diabetes or kidney problems:   If you haven t had a blood test (creatinine  test) within the last 30 days, go to your clinic or Diagnostic Imaging Department for this test.  If you have diabetes:   If your kidney function is normal, continue taking your metformin (Avandamet, Glucophage, Glucovance, Metaglip) on the day of your exam.   If your kidney function is abnormal, wait 48 hours before restarting this medicine.  You will have oral contrast for this exam:   You will drink the contrast at home. Get this from your clinic or Diagnostic Imaging Department. Please follow the directions given.  Please wear loose clothing, such as a sweat suit or jogging clothes. Avoid snaps, zippers and other metal. We may ask you to undress and put on a hospital gown.  If you have any questions, please call the Imaging Department where you will have your exam.            Nov 16, 2017  2:00 PM CST   (Arrive by 1:45 PM)   RETURN WITH ROOM with Lola Vasquez PhD LP,  [facility])    [address] [place] [address]   [phone]            Nov 20, 2017  3:20 PM CST   (Arrive by 3:05 PM)   Return Visit with Jarod Shaffer MD   [facility])    [address] [place] [address]   [phone]            Nov 30, 2017 12:00 PM CST   (Arrive by 11:45 AM)   RETURN WITH ROOM with Lola Vasquez PhD LP,  2 [address] [facility])    [address] [place] [address]   [phone]            Dec 07, 2017 12:00 PM CST   (Arrive by 11:45 AM)   RETURN WITH ROOM with Lola Vasquez PhD LP,  2 [address] [facility])    [address] [place] [address]   [phone]              Who to contact     If you have questions or need follow up information about today's  "clinic visit or your schedule please contact [facility] directly at [phone].  Normal or non-critical lab and imaging results will be communicated to you by MyChart, letter or phone within 4 business days after the clinic has received the results. If you do not hear from us within 7 days, please contact the clinic through [employer] or phone. If you have a critical or abnormal lab result, we will notify you by phone as soon as possible.  Submit refill requests through [employer] or call your pharmacy and they will forward the refill request to us. Please allow 3 business days for your refill to be completed.          Additional Information About Your Visit        MyChart Information     [employer] lets you send messages to your doctor, view your test results, renew your prescriptions, schedule appointments and more. To sign up, go to [email or web address] . Click on \"Log in\" on the left side of the screen, which will take you to the Welcome page. Then click on \"Sign up Now\" on the right side of the page.     You will be asked to enter the access code listed below, as well as some personal information. Please follow the directions to create your username and password.     Your access code is: [email or web address]  Expires: 2018  9:38 AM     Your access code will  in 90 days. If you need help or a new code, please call your [place] clinic or [phone].        Care EveryWhere ID     This is your Care EveryWhere ID. This could be used by other organizations to access your [place] medical records  [id number]        Your Vitals Were     Pulse Temperature Respirations Last Period Pulse Oximetry BMI (Body Mass Index)    91 98.5  F (36.9  C) (Oral) 18 2009 95% 31.78 kg/m2       Blood Pressure from Last 3 Encounters:   17 110/74   10/19/17 118/75   10/18/17 125/81    Weight from Last 3 Encounters:   17 179 lb 6 oz (81.4 kg)   10/19/17 185 lb 9.6 oz (84.2 kg)   10/16/17 185 lb 11.2 " oz (84.2 kg)              We Performed the Following     *UA reflex to Microscopic     Albumin Random Urine Quantitative with Creat Ratio     Glucose     Lipid panel reflex to direct LDL Fasting     Urine Microscopic          Today's Medication Changes          These changes are accurate as of: 11/1/17 11:27 PM.  If you have any questions, ask your nurse or doctor.               Start taking these medicines.        Dose/Directions    blood glucose lancets standard   Commonly known as:  no brand specified   Used for:  Type 2 diabetes mellitus without complication, without long-term current use of insulin (H)   Started by:  Morelia Ayon NP        Use to test blood sugar 2 times daily or as directed.   Quantity:  100 each   Refills:  11       blood glucose monitoring meter device kit   Commonly known as:  no brand specified   Used for:  Type 2 diabetes mellitus without complication, without long-term current use of insulin (H)   Started by:  Morelia Ayon NP        Use to test blood sugar 2 times daily or as directed.   Quantity:  1 kit   Refills:  0       blood glucose monitoring test strip   Commonly known as:  no brand specified   Used for:  Type 2 diabetes mellitus without complication, without long-term current use of insulin (H)   Started by:  Morelia Ayon NP        Use to test blood sugars 2 times daily or as directed   Quantity:  100 strip   Refills:  PRN         These medicines have changed or have updated prescriptions.        Dose/Directions    metFORMIN 500 MG 24 hr tablet   Commonly known as:  GLUCOPHAGE-XR   This may have changed:    - how much to take  - when to take this  - additional instructions   Used for:  Type 2 diabetes mellitus without complication, without long-term current use of insulin (H)        Take two tabs by mouth once daily with evening meal.   Quantity:  180 tablet   Refills:  PRN       * order for DME   This may have changed:  Another medication with the same name  "was added. Make sure you understand how and when to take each.   Used for:  Long-term (current) use of anticoagulants, Acute deep vein thrombosis (DVT) of left lower extremity, unspecified vein (H)        Compression stockings 20-30 mm Hg. To be wear when directed by Hematology team and while awake for the first two years post clot.   Quantity:  1 each   Refills:  0       * order for DME   This may have changed:  Another medication with the same name was added. Make sure you understand how and when to take each.   Used for:  Abscess of leg   Changed by:  Morelia Ayon NP        Plain packing strip 1/4\"   Quantity:  1 Bottle   Refills:  PRN       * order for DME   This may have changed:  Another medication with the same name was added. Make sure you understand how and when to take each.   Used for:  Abscess of leg   Changed by:  Morelia Ayon NP        Sterile zoey tipped applicators   Quantity:  1 Box   Refills:  PRN       * order for DME   This may have changed:  Another medication with the same name was added. Make sure you understand how and when to take each.   Used for:  Wound of left leg   Changed by:  Morelia Ayon NP        USE 1/4 INCH  WIDTH PLAIN NU GAUZE PACKING TO DO SELF WOUND CARE OF LEFT LOWER LEG ONCE DAILY. DIMENSIONS OF WOUND PRESENTLY ARE 2 X 2 INCH AND APPROXIMATELY 1/2 INCH DEEP.  USES 6 INCHES OF PACKING CURRENTLY FOR DRESSING CHANGE.  FAX TO [employer] [phone]   Quantity:  3 Bottle   Refills:  2       * order for DME   This may have changed:  Another medication with the same name was added. Make sure you understand how and when to take each.   Used for:  Leg wound, left   Changed by:  Morelia Ayon NP        STERILE ADHESIVE PAD BANDAGE AT LEAST 4X4 INCH IN SIZE NEEDED FOR DAILY WOUND CARE TO LEFT LOWER LEG. FAX TO [employer] [phone]   Quantity:  30 pad   Refills:  1       * order for DME   This may have changed:  You were already taking a " medication with the same name, and this prescription was added. Make sure you understand how and when to take each.   Used for:  Type 2 diabetes mellitus without complication, without long-term current use of insulin (H)   Changed by:  Morelia Ayon NP        Home blood pressure monitor   Quantity:  1 Units   Refills:  0       * Notice:  This list has 6 medication(s) that are the same as other medications prescribed for you. Read the directions carefully, and ask your doctor or other care provider to review them with you.         Where to get your medicines      These medications were sent to [place], [place] - [address] [facility] [address]  [address], [facility] [address]    Hours:  TRANSPLANT PHONE NUMBER [phone] Phone:  [phone]     blood glucose lancets standard    blood glucose monitoring meter device kit    blood glucose monitoring test strip         Some of these will need a paper prescription and others can be bought over the counter.  Ask your nurse if you have questions.     Bring a paper prescription for each of these medications     order for DME                Primary Care Provider Office Phone # Fax #    Morelia Ayon [phone]       [address] [facility] [address]        Equal Access to Services     RONEY ARANDA AH: Hadii serena joyner hadasho Soomaali, waaxda luqadaha, qaybta kaalmada adeegyada, sujatha friedman. So [facility] [phone].    ATENCIÓN: Si habla español, tiene a martínez disposición servicios gratuitos de asistencia lingüística. Llame al [phone].    We comply with applicable federal civil rights laws and [place] laws. We do not discriminate on the basis of race, color, national origin, age, disability, sex, sexual orientation, or gender identity.            Thank you!     Thank you for choosing [facility]  for your care. Our goal is always to provide you  with excellent care. Hearing back from our patients is one way we can continue to improve our services. Please take a few minutes to complete the written survey that you may receive in the mail after your visit with us. Thank you!             Your Updated Medication List - Protect others around you: Learn how to safely use, store and throw away your medicines at www.disposemymeds.org.          This list is accurate as of: 11/1/17 11:27 PM.  Always use your most recent med list.                   Brand Name Dispense Instructions for use Diagnosis    amoxicillin-clavulanate 875-125 MG per tablet    AUGMENTIN    42 tablet    Take 1 tablet by mouth 2 times daily    Chronic maxillary sinusitis       atorvastatin 80 MG tablet    LIPITOR    90 tablet    Take 1 tablet (80 mg) by mouth daily    Hyperlipidemia LDL goal <100       blood glucose lancets standard    no brand specified    100 each    Use to test blood sugar 2 times daily or as directed.    Type 2 diabetes mellitus without complication, without long-term current use of insulin (H)       blood glucose monitoring meter device kit    no brand specified    1 kit    Use to test blood sugar 2 times daily or as directed.    Type 2 diabetes mellitus without complication, without long-term current use of insulin (H)       blood glucose monitoring test strip    no brand specified    100 strip    Use to test blood sugars 2 times daily or as directed    Type 2 diabetes mellitus without complication, without long-term current use of insulin (H)       CALCIUM-MAGNESIUM-VITAMIN D PO      Take 2 tablets by mouth daily        EPINEPHrine 0.3 MG/0.3ML injection 2-pack    EPIPEN/ADRENACLICK/or ANY BX GENERIC EQUIV    1 each    Inject 0.3 mLs (0.3 mg) into the muscle once as needed for anaphylaxis    Bee allergy status       escitalopram 20 MG tablet    LEXAPRO    30 tablet    Take 1 tablet (20 mg) by mouth daily    Major depressive disorder, recurrent episode, mild (H)        "fluticasone 50 MCG/ACT spray    FLONASE    48 mL    SHAKE WELL AND USE 2 SPRAYS IN EACH NOSTRIL DAILY    Chronic maxillary sinusitis       gabapentin 100 MG capsule    NEURONTIN    42 capsule    Take 1-2 capsules (100-200 mg) by mouth 3 times daily Start the day after chemo for one week    Pain in joint, multiple sites       L-GLUTAMINE PO      Take 2 g by mouth 2 times daily        lisinopril 10 MG tablet    PRINIVIL/ZESTRIL    90 tablet    Take 1 tablet (10 mg) by mouth daily    Essential hypertension       loratadine 10 MG tablet    CLARITIN    30 tablet    Take 1 tablet (10 mg) by mouth daily    Pain in joint, multiple sites       LORazepam 1 MG tablet    ATIVAN    30 tablet    Take 1 tablet (1 mg) by mouth every 6 hours as needed (nausea/vomiting, anxiety or sleep)    Ovarian cancer, left (H), Encounter for long-term (current) use of medications       metFORMIN 500 MG 24 hr tablet    GLUCOPHAGE-XR    180 tablet    Take two tabs by mouth once daily with evening meal.    Type 2 diabetes mellitus without complication, without long-term current use of insulin (H)       MULTIVITAMIN ADULT PO      Take 1 tablet by mouth daily        order for DME     1 Units    Equipment being ordered: blood pressure monitor    Essential hypertension       * order for DME     1 each    Compression stockings 20-30 mm Hg. To be wear when directed by Hematology team and while awake for the first two years post clot.    Long-term (current) use of anticoagulants, Acute deep vein thrombosis (DVT) of left lower extremity, unspecified vein (H)       * order for DME     1 Bottle    Plain packing strip 1/4\"    Abscess of leg       * order for DME     1 Box    Sterile zoey tipped applicators    Abscess of leg       * order for DME     3 Bottle    USE 1/4 INCH  WIDTH PLAIN NU GAUZE PACKING TO DO SELF WOUND CARE OF LEFT LOWER LEG ONCE DAILY. DIMENSIONS OF WOUND PRESENTLY ARE 2 X 2 INCH AND APPROXIMATELY 1/2 INCH DEEP.  USES 6 INCHES OF PACKING " CURRENTLY FOR DRESSING CHANGE.  FAX TO [facility] [phone]    Wound of left leg       * order for DME     30 pad    STERILE ADHESIVE PAD BANDAGE AT LEAST 4X4 INCH IN SIZE NEEDED FOR DAILY WOUND CARE TO LEFT LOWER LEG. FAX TO [facility] [phone]    Leg wound, left       * order for DME     1 Units    Home blood pressure monitor    Type 2 diabetes mellitus without complication, without long-term current use of insulin (H)       PRO-BIOTIC BLEND PO      Take 1 capsule by mouth daily Enzymatic Therapy-probiotic pearls        prochlorperazine 10 MG tablet    COMPAZINE    30 tablet    Take 1 tablet (10 mg) by mouth every 6 hours as needed (nausea/vomiting)    Ovarian cancer, left (H), Encounter for long-term (current) use of medications       rivaroxaban ANTICOAGULANT 20 MG Tabs tablet    XARELTO    30 tablet    Take 1 tablet (20 mg) by mouth daily (with dinner)    Long-term (current) use of anticoagulants, Acute deep vein thrombosis (DVT) of left lower extremity, unspecified vein (H)       traZODone 50 MG tablet    DESYREL    180 tablet    TAKE 1 TO 2 TABLETS(50  MG) BY MOUTH EVERY NIGHT AS NEEDED FOR SLEEP    Insomnia, unspecified type       VITAMIN B-COMPLEX PO           vitamin D 1000 UNITS capsule      Take 2,000 Units by mouth daily        VYVANSE PO      Take 50 mg by mouth daily        * Notice:  This list has 6 medication(s) that are the same as other medications prescribed for you. Read the directions carefully, and ask your doctor or other care provider to review them with you.

## 2017-11-01 NOTE — PROGRESS NOTES
SUBJECTIVE:   Hafsa Corona is a 62 year old female who presents to clinic today for the following health issues:  Has not taken BP meds last night or this morning     Sinus Problem Follow Up     Duration: 2.5 weeks     Description  Right now having nasal congestion with runny nose, still having some lung congestion, energy level is getting a little bit better. Swollen glands under left arm pit. Noticed in the last 2 weeks swollen/sore glands. No fever    Therapies tried and outcome:   Augmentin 875-125 MG helped but still having symptoms - she is on day 9 of Augmentin.    She has a history of chronic sinusitis that has required 21 days of treatment (initially recommended by ENT).  Currently getting chemotherapy for ovarian cancer.   She is just starting to notice improvement in her symptoms      Follow Up Dog Bite     Duration: June 2017    Description (location/character/radiation): discuss abscess     Therapies tried and outcome: was getting wound care in the hospital, doing it at home but stopped recently    No fevers.    Foot Problem   Pebble that broke through skin on right foot 5 days ago.  She has been using bacitracin.       Diabetes Follow-up    Patient is checking blood sugars: not at all. Would like to start. Prescribe meter today if possible     Diabetic concerns: None and other - discuss fasting labs.  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 Mostly in toes and finger  Is going to acupuncture    She does have type 2 diabetes, but typically it has been really well-controlled, so she has not been checking her blood sugar in the past.             Problem list and histories reviewed & adjusted, as indicated.  Additional history: as documented        Reviewed and updated as needed this visit by clinical staff     Reviewed and  updated as needed this visit by Provider         ROS:  C: NEGATIVE for fever, chills, change in weight  INTEGUMENTARY/SKIN: see HPI  EYES: NEGATIVE for vision changes or irritation  ENT/MOUTH: see HPI  R: NEGATIVE for significant cough or SOB  CV: NEGATIVE for chest pain, palpitations or peripheral edema  MUSCULOSKELETAL: NEGATIVE for significant arthralgias or myalgia  NEURO: NEGATIVE for weakness, dizziness or paresthesias  PSYCHIATRIC: NEGATIVE for changes in mood or affect    OBJECTIVE:     /74  Pulse 91  Temp 98.5  F (36.9  C) (Oral)  Resp 18  Wt 179 lb 6 oz (81.4 kg)  LMP 01/01/2009  SpO2 95%  BMI 31.78 kg/m2  Body mass index is 31.78 kg/(m^2).  GENERAL: healthy, alert and no distress  HENT: normal cephalic/atraumatic, ear canals and TM's normal, nose and mouth without ulcers or lesions, oropharynx clear, oral mucous membranes moist and sinuses: maxillary tenderness on left  NECK: no adenopathy, no asymmetry, masses, or scars and thyroid normal to palpation  RESP: lungs clear to auscultation - no rales, rhonchi or wheezes  CV: regular rate and rhythm, normal S1 S2, no S3 or S4, no murmur, click or rub, no peripheral edema and peripheral pulses strong  ABDOMEN: soft, nontender, no hepatosplenomegaly, no masses and bowel sounds normal  MS: no gross musculoskeletal defects noted, no edema  SKIN: plantar surface of the right foot without any evidence of infection; anterior left thigh with a slight amount of sanguinous fluid expressed from a tiny opening, no erythema  NEURO: Normal strength and tone, mentation intact and speech normal  PSYCH: mentation appears normal, affect normal/bright        ASSESSMENT/PLAN:             1. Type 2 diabetes mellitus without complication, without long-term current use of insulin (H)  At goal  Discussed checking blood sugar levels  Follow up in 4 months  - blood glucose (NO BRAND SPECIFIED) lancets standard; Use to test blood sugar 2 times daily or as directed.   Dispense: 100 each; Refill: 11  - blood glucose monitoring (NO BRAND SPECIFIED) meter device kit; Use to test blood sugar 2 times daily or as directed.  Dispense: 1 kit; Refill: 0  - blood glucose monitoring (NO BRAND SPECIFIED) test strip; Use to test blood sugars 2 times daily or as directed  Dispense: 100 strip; Refill: PRN  - order for DME; Home blood pressure monitor  Dispense: 1 Units; Refill: 0  - Lipid panel reflex to direct LDL Fasting  - Glucose  - Albumin Random Urine Quantitative with Creat Ratio  - Urine Microscopic    2. Essential hypertension  At goal  The current medical regimen is effective;  continue present plan and medications.     3. Malignant neoplasm of right ovary (H)  Follow up with oncology.   - *UA reflex to Microscopic - requested by patient to ensure UTI has cleared, currently asymptomatic        Morelia Ayon, ATTILA  [facility]

## 2017-11-02 LAB
CHOLEST SERPL-MCNC: 148 MG/DL
GLUCOSE SERPL-MCNC: 88 MG/DL (ref 70–99)
HDLC SERPL-MCNC: 54 MG/DL
LDLC SERPL CALC-MCNC: 70 MG/DL
NONHDLC SERPL-MCNC: 94 MG/DL
TRIGL SERPL-MCNC: 121 MG/DL

## 2017-11-03 LAB
CREAT UR-MCNC: 132 MG/DL
MICROALBUMIN UR-MCNC: 8 MG/L
MICROALBUMIN/CREAT UR: 5.72 MG/G CR (ref 0–25)

## 2017-11-06 ENCOUNTER — CARE COORDINATION (OUTPATIENT)
Dept: ONCOLOGY | Facility: CLINIC | Age: 62
End: 2017-11-06

## 2017-11-06 ENCOUNTER — TRANSFERRED RECORDS (OUTPATIENT)
Dept: HEALTH INFORMATION MANAGEMENT | Facility: CLINIC | Age: 62
End: 2017-11-06

## 2017-11-06 DIAGNOSIS — C56.9 MALIGNANT NEOPLASM OF OVARY, UNSPECIFIED LATERALITY (H): Primary | ICD-10-CM

## 2017-11-06 DIAGNOSIS — Z79.899 ENCOUNTER FOR LONG-TERM (CURRENT) USE OF MEDICATIONS: ICD-10-CM

## 2017-11-06 NOTE — PROGRESS NOTES
Care Coordinator Note  Patient is seeing her PCP for head cold symptoms.  States she is still having taste change issues post chemotherapy, but has noticed improvement.  She will have lab draw when she comes in for her CT scan.    Patient verbalized back understanding of the above information discussed.     Shanon SMITH, RN  Care Coordinator  Gynecologic Cancer   Office:  [phone]  Pager: [phone] #[phone]

## 2017-11-09 ENCOUNTER — CARE COORDINATION (OUTPATIENT)
Dept: ONCOLOGY | Facility: CLINIC | Age: 62
End: 2017-11-09

## 2017-11-09 ENCOUNTER — OFFICE VISIT (OUTPATIENT)
Dept: PSYCHOLOGY | Facility: CLINIC | Age: 62
End: 2017-11-09
Attending: PSYCHOLOGIST
Payer: COMMERCIAL

## 2017-11-09 DIAGNOSIS — F43.20 ADJUSTMENT DISORDER, UNSPECIFIED TYPE: Primary | ICD-10-CM

## 2017-11-09 PROCEDURE — 40000114 ZZH STATISTIC NO CHARGE CLINIC VISIT

## 2017-11-09 PROCEDURE — 90834 PSYTX W PT 45 MINUTES: CPT | Mod: ZP | Performed by: PSYCHOLOGIST

## 2017-11-09 NOTE — PROGRESS NOTES
Care Coordinator Note  Call from patient, she would like to  her copy of her insurance paper work today.  She also had questions about a urine specimen she had done at her PCP's office.  She will follow up with her PCP regarding her questions.      Patient verbalized back understanding of the above information discussed.     Shanon SMIHT, RN  Care Coordinator  Gynecologic Cancer   Office:  [phone]  Pager: [phone] #[phone]

## 2017-11-09 NOTE — MR AVS SNAPSHOT
After Visit Summary   11/9/2017    Hafsa Corona    MRN: [id number]           Patient Information     Date Of Birth          1955        Visit Information        Provider Department      11/9/2017 12:00 PM Lola Vasquez, PhD LP;  2 118 CONSULT [facility]        Today's Diagnoses     Adjustment disorder, unspecified type    -  1       Follow-ups after your visit        Your next 10 appointments already scheduled     Nov 14, 2017  9:15 AM CST   [facility] Draw with [facility] LAB DRAW   [facility] Lab Draw ([facility])    [address] [place] [id number] [phone]            Nov 14, 2017 10:00 AM CST   (Arrive by 9:45 AM)   CT CHEST/ABDOMEN/PELVIS W CONTRAST with UCCT1   [facility] CT ([facility])    [address] [place] [id number] [phone]           Please bring any scans or X-rays taken at other hospitals, if similar tests were done. Also bring a list of your medicines, including vitamins, minerals and over-the-counter drugs. It is safest to leave personal items at home.  Be sure to tell your doctor:   If you have any allergies.   If there s any chance you are pregnant.   If you are breastfeeding.   If you have any special needs.  You may have contrast for this exam. To prepare:   Do not eat or drink for 2 hours before your exam. If you need to take medicine, you may take it with small sips of water. (We may ask you to take liquid medicine as well.)   The day before your exam, drink extra fluids at least six 8-ounce glasses (unless your doctor tells you to restrict your fluids).  Patients over 70 or patients with diabetes or kidney problems:   If you haven t had a blood test (creatinine test) within the last 30 days, go to your clinic or Diagnostic Imaging Department for this test.  If you have diabetes:   If your kidney function is normal,  continue taking your metformin (Avandamet, Glucophage, Glucovance, Metaglip) on the day of your exam.   If your kidney function is abnormal, wait 48 hours before restarting this medicine.  You will have oral contrast for this exam:   You will drink the contrast at home. Get this from your clinic or Diagnostic Imaging Department. Please follow the directions given.  Please wear loose clothing, such as a sweat suit or jogging clothes. Avoid snaps, zippers and other metal. We may ask you to undress and put on a hospital gown.  If you have any questions, please call the Imaging Department where you will have your exam.            Nov 16, 2017 11:15 AM CST   Pre-Op physical with Morelia Ayon NP   [facility]    [address] [place] [id number]   [phone]            Nov 16, 2017  2:00 PM CST   (Arrive by 1:45 PM)   RETURN WITH ROOM with Lola Vasquez, PhD ,  2 118 CONSULT [facility] ([facility])    [address] [place] [address]   [phone]            Nov 17, 2017  1:15 PM CST   (Arrive by 1:00 PM)   RETURN WITH ROOM with Mariam Sams GC,  [address] CONSULT [facility])    [address] [place] [address]   [phone]            Nov 20, 2017  3:20 PM CST   (Arrive by 3:05 PM)   Return Visit with Jarod Shaffer MD   [facility])    [address] [place] [id number]   [phone]            Nov 30, 2017   Procedure with Norris Dixon MD   [facility] (--)    [address]  [facility] [address]   [phone]            Dec 07, 2017 12:00 PM CST   (Arrive by 11:45 AM)   RETURN WITH ROOM with Lola Vasquez, PhD BENJAMIN   [facility]  "Surgery Center)    [address] [facility]  [address] [facility] [address] [phone]              Who to contact     If you have questions or need follow up information about today's clinic visit or your schedule please contact [facility] directly at [phone].  Normal or non-critical lab and imaging results will be communicated to you by MyChart, letter or phone within 4 business days after the clinic has received the results. If you do not hear from us within 7 days, please contact the clinic through MyChart or phone. If you have a critical or abnormal lab result, we will notify you by phone as soon as possible.  Submit refill requests through [employer] or call your pharmacy and they will forward the refill request to us. Please allow 3 business days for your refill to be completed.          Additional Information About Your Visit        MyChart Information     [facility] lets you send messages to your doctor, view your test results, renew your prescriptions, schedule appointments and more. To sign up, go to [email or web address] . Click on \"Log in\" on the left side of the screen, which will take you to the Welcome page. Then click on \"Sign up Now\" on the right side of the page.     You will be asked to enter the access code listed below, as well as some personal information. Please follow the directions to create your username and password.     Your access code is: [email or web address]  Expires: 2018  8:38 AM     Your access code will  in 90 days. If you need help or a new code, please call your [facility] clinic or [phone].        Care EveryWhere ID     This is your Care EveryWhere ID. This could be used by other organizations to access your [facility] medical records  [id number]        Your Vitals Were     Last Period                   2009            Blood Pressure from Last 3 Encounters:   17 110/74   10/19/17 118/75   10/18/17 125/81    Weight from Last 3 " Encounters:   11/01/17 81.4 kg (179 lb 6 oz)   10/19/17 84.2 kg (185 lb 9.6 oz)   10/16/17 84.2 kg (185 lb 11.2 oz)              Today, you had the following     No orders found for display         Today's Medication Changes          These changes are accurate as of: 11/9/17 11:59 PM.  If you have any questions, ask your nurse or doctor.               These medicines have changed or have updated prescriptions.        Dose/Directions    metFORMIN 500 MG 24 hr tablet   Commonly known as:  GLUCOPHAGE-XR   This may have changed:    - how much to take  - when to take this  - additional instructions   Used for:  Type 2 diabetes mellitus without complication, without long-term current use of insulin (H)        Take two tabs by mouth once daily with evening meal.   Quantity:  180 tablet   Refills:  PRN                Primary Care Provider Office Phone # Fax #    Morelia JAVIER Ayon [phone]       [address] [facility] [address]        Equal Access to Services     [facility] MANOJ : Hadii aad ku hadasho Soomaali, waaxda luqadaha, qaybta kaalmada adeegyada, waxay marlenyin hayalessandro betts . So [facility] [phone].    ATENCIÓN: Si habla español, tiene a martínez disposición servicios gratuitos de asistencia lingüística. Llame al [phone].    We comply with applicable federal civil rights laws and [place] laws. We do not discriminate on the basis of race, color, national origin, age, disability, sex, sexual orientation, or gender identity.            Thank you!     Thank you for choosing [facility] [place]  for your care. Our goal is always to provide you with excellent care. Hearing back from our patients is one way we can continue to improve our services. Please take a few minutes to complete the written survey that you may receive in the mail after your visit with us. Thank you!             Your Updated Medication List - Protect others around you: Learn how to safely use, store and  throw away your medicines at www.disposemymeds.org.          This list is accurate as of: 11/9/17 11:59 PM.  Always use your most recent med list.                   Brand Name Dispense Instructions for use Diagnosis    amoxicillin-clavulanate 875-125 MG per tablet    AUGMENTIN    42 tablet    Take 1 tablet by mouth 2 times daily    Chronic maxillary sinusitis       atorvastatin 80 MG tablet    LIPITOR    90 tablet    Take 1 tablet (80 mg) by mouth daily    Hyperlipidemia LDL goal <100       blood glucose lancets standard    no brand specified    100 each    Use to test blood sugar 2 times daily or as directed.    Type 2 diabetes mellitus without complication, without long-term current use of insulin (H)       blood glucose monitoring meter device kit    no brand specified    1 kit    Use to test blood sugar 2 times daily or as directed.    Type 2 diabetes mellitus without complication, without long-term current use of insulin (H)       blood glucose monitoring test strip    no brand specified    100 strip    Use to test blood sugars 2 times daily or as directed    Type 2 diabetes mellitus without complication, without long-term current use of insulin (H)       CALCIUM-MAGNESIUM-VITAMIN D PO      Take 2 tablets by mouth daily        EPINEPHrine 0.3 MG/0.3ML injection 2-pack    EPIPEN/ADRENACLICK/or ANY BX GENERIC EQUIV    1 each    Inject 0.3 mLs (0.3 mg) into the muscle once as needed for anaphylaxis    Bee allergy status       escitalopram 20 MG tablet    LEXAPRO    30 tablet    Take 1 tablet (20 mg) by mouth daily    Major depressive disorder, recurrent episode, mild (H)       fluticasone 50 MCG/ACT spray    FLONASE    48 mL    SHAKE WELL AND USE 2 SPRAYS IN EACH NOSTRIL DAILY    Chronic maxillary sinusitis       gabapentin 100 MG capsule    NEURONTIN    42 capsule    Take 1-2 capsules (100-200 mg) by mouth 3 times daily Start the day after chemo for one week    Pain in joint, multiple sites       L-GLUTAMINE PO  "     Take 2 g by mouth 2 times daily        lisinopril 10 MG tablet    PRINIVIL/ZESTRIL    90 tablet    Take 1 tablet (10 mg) by mouth daily    Essential hypertension       loratadine 10 MG tablet    CLARITIN    30 tablet    Take 1 tablet (10 mg) by mouth daily    Pain in joint, multiple sites       LORazepam 1 MG tablet    ATIVAN    30 tablet    Take 1 tablet (1 mg) by mouth every 6 hours as needed (nausea/vomiting, anxiety or sleep)    Ovarian cancer, left (H), Encounter for long-term (current) use of medications       metFORMIN 500 MG 24 hr tablet    GLUCOPHAGE-XR    180 tablet    Take two tabs by mouth once daily with evening meal.    Type 2 diabetes mellitus without complication, without long-term current use of insulin (H)       MULTIVITAMIN ADULT PO      Take 1 tablet by mouth daily        order for DME     1 Units    Equipment being ordered: blood pressure monitor    Essential hypertension       * order for DME     1 each    Compression stockings 20-30 mm Hg. To be wear when directed by Hematology team and while awake for the first two years post clot.    Long-term (current) use of anticoagulants, Acute deep vein thrombosis (DVT) of left lower extremity, unspecified vein (H)       * order for DME     1 Bottle    Plain packing strip 1/4\"    Abscess of leg       * order for DME     1 Box    Sterile zoey tipped applicators    Abscess of leg       * order for DME     3 Bottle    USE 1/4 INCH  WIDTH PLAIN NU GAUZE PACKING TO DO SELF WOUND CARE OF LEFT LOWER LEG ONCE DAILY. DIMENSIONS OF WOUND PRESENTLY ARE 2 X 2 INCH AND APPROXIMATELY 1/2 INCH DEEP.  USES 6 INCHES OF PACKING CURRENTLY FOR DRESSING CHANGE.  FAX TO [facility] [phone]    Wound of left leg       * order for DME     30 pad    STERILE ADHESIVE PAD BANDAGE AT LEAST 4X4 INCH IN SIZE NEEDED FOR DAILY WOUND CARE TO LEFT LOWER LEG. FAX TO [facility] [phone]    Leg wound, left       * order for DME     1 Units    Home blood pressure " monitor    Type 2 diabetes mellitus without complication, without long-term current use of insulin (H)       PRO-BIOTIC BLEND PO      Take 1 capsule by mouth daily Enzymatic Therapy-probiotic pearls        prochlorperazine 10 MG tablet    COMPAZINE    30 tablet    Take 1 tablet (10 mg) by mouth every 6 hours as needed (nausea/vomiting)    Ovarian cancer, left (H), Encounter for long-term (current) use of medications       rivaroxaban ANTICOAGULANT 20 MG Tabs tablet    XARELTO    30 tablet    Take 1 tablet (20 mg) by mouth daily (with dinner)    Long-term (current) use of anticoagulants, Acute deep vein thrombosis (DVT) of left lower extremity, unspecified vein (H)       traZODone 50 MG tablet    DESYREL    180 tablet    TAKE 1 TO 2 TABLETS(50  MG) BY MOUTH EVERY NIGHT AS NEEDED FOR SLEEP    Insomnia, unspecified type       VITAMIN B-COMPLEX PO           vitamin D 1000 UNITS capsule      Take 2,000 Units by mouth daily        VYVANSE PO      Take 50 mg by mouth daily        * Notice:  This list has 6 medication(s) that are the same as other medications prescribed for you. Read the directions carefully, and ask your doctor or other care provider to review them with you.

## 2017-11-09 NOTE — PROGRESS NOTES
"Psychology Progress Note  Counseling (#3).  INDIV TH 50 min.  INTERV pr solving, support.  DX: F43.23  OK Mood variability  S \"they help me but. . Coming. . End of chemotherapy. . Helped me financially before. . \"  O Highly verbal.  Focused on siblings' coming visit.  Ruminative.  Resentful, dissatisfied, anxious.  A Siblings have helped with finances but essentially she has not liked their attitude.  Highly verbal today (more than usual) - probably associated with coming family visit, her anticipating that siblings will be critical of her (nazario regarding her finances), her expectation that she will continue to need their financial support.  Their explicit reason for coming is to celebrate the end of chemotherapy.  GOAL Learn about self.  Reduce emotionality.  Plan.  RE 1-3 wks.  TP 9.12.2017    "

## 2017-11-14 ENCOUNTER — NURSE TRIAGE (OUTPATIENT)
Dept: NURSING | Facility: CLINIC | Age: 62
End: 2017-11-14

## 2017-11-14 ENCOUNTER — TRANSFERRED RECORDS (OUTPATIENT)
Dept: HEALTH INFORMATION MANAGEMENT | Facility: CLINIC | Age: 62
End: 2017-11-14

## 2017-11-14 DIAGNOSIS — C56.9 MALIGNANT NEOPLASM OF OVARY, UNSPECIFIED LATERALITY (H): ICD-10-CM

## 2017-11-14 DIAGNOSIS — Z79.899 ENCOUNTER FOR LONG-TERM (CURRENT) USE OF MEDICATIONS: ICD-10-CM

## 2017-11-14 LAB
ALBUMIN SERPL-MCNC: 3.4 G/DL (ref 3.4–5)
ALP SERPL-CCNC: 87 U/L (ref 40–150)
ALT SERPL W P-5'-P-CCNC: 16 U/L (ref 0–50)
ANION GAP SERPL CALCULATED.3IONS-SCNC: 7 MMOL/L (ref 3–14)
AST SERPL W P-5'-P-CCNC: 15 U/L (ref 0–45)
BASOPHILS # BLD AUTO: 0 10E9/L (ref 0–0.2)
BASOPHILS NFR BLD AUTO: 0.4 %
BILIRUB SERPL-MCNC: 0.4 MG/DL (ref 0.2–1.3)
BUN SERPL-MCNC: 20 MG/DL (ref 7–30)
CALCIUM SERPL-MCNC: 8.8 MG/DL (ref 8.5–10.1)
CANCER AG125 SERPL-ACNC: 8 U/ML (ref 0–30)
CHLORIDE SERPL-SCNC: 108 MMOL/L (ref 94–109)
CO2 SERPL-SCNC: 27 MMOL/L (ref 20–32)
CREAT SERPL-MCNC: 0.71 MG/DL (ref 0.52–1.04)
DIFFERENTIAL METHOD BLD: ABNORMAL
EOSINOPHIL # BLD AUTO: 0.1 10E9/L (ref 0–0.7)
EOSINOPHIL NFR BLD AUTO: 1.4 %
ERYTHROCYTE [DISTWIDTH] IN BLOOD BY AUTOMATED COUNT: 21 % (ref 10–15)
GFR SERPL CREATININE-BSD FRML MDRD: 83 ML/MIN/1.7M2
GLUCOSE SERPL-MCNC: 104 MG/DL (ref 70–99)
HCT VFR BLD AUTO: 30 % (ref 35–47)
HGB BLD-MCNC: 9.4 G/DL (ref 11.7–15.7)
IMM GRANULOCYTES # BLD: 0 10E9/L (ref 0–0.4)
IMM GRANULOCYTES NFR BLD: 0.2 %
LYMPHOCYTES # BLD AUTO: 1.7 10E9/L (ref 0.8–5.3)
LYMPHOCYTES NFR BLD AUTO: 34 %
MAGNESIUM SERPL-MCNC: 1.9 MG/DL (ref 1.6–2.3)
MCH RBC QN AUTO: 29.7 PG (ref 26.5–33)
MCHC RBC AUTO-ENTMCNC: 31.3 G/DL (ref 31.5–36.5)
MCV RBC AUTO: 95 FL (ref 78–100)
MONOCYTES # BLD AUTO: 0.5 10E9/L (ref 0–1.3)
MONOCYTES NFR BLD AUTO: 10.9 %
NEUTROPHILS # BLD AUTO: 2.6 10E9/L (ref 1.6–8.3)
NEUTROPHILS NFR BLD AUTO: 53.1 %
NRBC # BLD AUTO: 0 10*3/UL
NRBC BLD AUTO-RTO: 0 /100
PLATELET # BLD AUTO: 229 10E9/L (ref 150–450)
POTASSIUM SERPL-SCNC: 4.1 MMOL/L (ref 3.4–5.3)
PROT SERPL-MCNC: 6.4 G/DL (ref 6.8–8.8)
RBC # BLD AUTO: 3.17 10E12/L (ref 3.8–5.2)
SODIUM SERPL-SCNC: 143 MMOL/L (ref 133–144)
WBC # BLD AUTO: 4.9 10E9/L (ref 4–11)

## 2017-11-14 PROCEDURE — 80053 COMPREHEN METABOLIC PANEL: CPT | Performed by: OBSTETRICS & GYNECOLOGY

## 2017-11-14 PROCEDURE — 83735 ASSAY OF MAGNESIUM: CPT | Performed by: OBSTETRICS & GYNECOLOGY

## 2017-11-14 PROCEDURE — 86304 IMMUNOASSAY TUMOR CA 125: CPT | Performed by: OBSTETRICS & GYNECOLOGY

## 2017-11-14 PROCEDURE — 25000128 H RX IP 250 OP 636: Performed by: OBSTETRICS & GYNECOLOGY

## 2017-11-14 PROCEDURE — 85025 COMPLETE CBC W/AUTO DIFF WBC: CPT | Performed by: OBSTETRICS & GYNECOLOGY

## 2017-11-14 RX ORDER — HEPARIN SODIUM (PORCINE) LOCK FLUSH IV SOLN 100 UNIT/ML 100 UNIT/ML
5 SOLUTION INTRAVENOUS ONCE
Status: COMPLETED | OUTPATIENT
Start: 2017-11-14 | End: 2017-11-14

## 2017-11-14 RX ADMIN — SODIUM CHLORIDE, PRESERVATIVE FREE 5 ML: 5 INJECTION INTRAVENOUS at 09:46

## 2017-11-14 NOTE — TELEPHONE ENCOUNTER
"Hafsa does \"not\" (have) \"a medical emergency\".  Calling to speak directly with oncology clinic RN Shanon or Pema.  Will call tomorrow when clinic open.    Additional Information    General information question, no triage required and triager able to answer question    Protocols used: INFORMATION ONLY CALL-ADULT-    "

## 2017-11-14 NOTE — NURSING NOTE
"Chief Complaint   Patient presents with     Port Draw     Labs drawn from port by RN. Line flushed with saline and heparin and deaccessed.     Port accessed with 20g 3/4\" gripper needle by RN, labs collected, line flushed with saline and heparin.    Sheron Jauregui, RN  "

## 2017-11-15 ENCOUNTER — TRANSFERRED RECORDS (OUTPATIENT)
Dept: HEALTH INFORMATION MANAGEMENT | Facility: CLINIC | Age: 62
End: 2017-11-15

## 2017-11-16 ENCOUNTER — OFFICE VISIT (OUTPATIENT)
Dept: PSYCHOLOGY | Facility: CLINIC | Age: 62
End: 2017-11-16
Attending: PSYCHOLOGIST
Payer: COMMERCIAL

## 2017-11-16 ENCOUNTER — TELEPHONE (OUTPATIENT)
Dept: ONCOLOGY | Facility: CLINIC | Age: 62
End: 2017-11-16

## 2017-11-16 DIAGNOSIS — F43.20 ADJUSTMENT DISORDER, UNSPECIFIED TYPE: Primary | ICD-10-CM

## 2017-11-16 DIAGNOSIS — Z85.43 HISTORY OF OVARIAN CANCER: ICD-10-CM

## 2017-11-16 DIAGNOSIS — N28.89 RENAL MASS, RIGHT: Primary | ICD-10-CM

## 2017-11-16 PROCEDURE — 40000114 ZZH STATISTIC NO CHARGE CLINIC VISIT

## 2017-11-16 PROCEDURE — 90834 PSYTX W PT 45 MINUTES: CPT | Mod: ZP | Performed by: PSYCHOLOGIST

## 2017-11-16 NOTE — PROGRESS NOTES
Called Hafsa to discuss the concerning finding of a renal mass on her CT scan. She will need a repeat CT CAP with contrast for a renal mass protocol per radiology. Hafsa is saddened and anxious that she has another area of concern but is agreeable to proceed with renal mass protocol CT. Has follow up with Dr. Vasquez today which will be good for her to talk about her anxiety regarding this. She will follow up as scheduled with Dr. Shaffer on Monday, 11/20. Questions were answered to the best of my ability. Hasfa verbalized understanding of and agreement with plan.  MARLON Ogden, FNP-C  Gynecologic Oncology  [facility]  Pager: [phone]
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
## 2017-11-16 NOTE — MR AVS SNAPSHOT
After Visit Summary   11/16/2017    Hafsa Corona    MRN: [id number]           Patient Information     Date Of Birth          1955        Visit Information        Provider Department      11/16/2017 2:00 PM Lola Vasquez, PhD LP; [facility]SULT [facility]        Today's Diagnoses     Adjustment disorder, unspecified type    -  1       Follow-ups after your visit        Your next 10 appointments already scheduled     Nov 20, 2017  3:20 PM CST   (Arrive by 3:05 PM)   Return Visit with Jarod Shaffer MD   [facility] ([facility])    [address] [place] [id number]   [phone]            Dec 07, 2017 12:00 PM CST   (Arrive by 11:45 AM)   RETURN WITH ROOM with Lola Vasquez PhD LP,  2 [address] CONSULT [facility] ([facility])    [address] [place] [id number] [phone]            Dec 08, 2017  1:15 PM CST   (Arrive by 1:00 PM)   RETURN WITH ROOM with Mariam Sams GC, [facility] [address] CONSULT [facility] ([facility])    [address] [place] [id number] [phone]            Dec 14, 2017 12:00 PM CST   (Arrive by 11:45 AM)   RETURN WITH ROOM with Lola Vasquez PhD LP, [facility] 2 [address] CONSULT [facility] ([facility])    [address] [place] [id number] [phone]              Who to contact     If you have questions or need follow up information about today's clinic visit or your schedule please contact [facility] directly at [phone].  Normal or non-critical lab and imaging results will be communicated to you by MyChart, letter or phone within 4 business days after the clinic has received the results. If you do not hear from us within 7 days, please contact the  "clinic through [employer] or phone. If you have a critical or abnormal lab result, we will notify you by phone as soon as possible.  Submit refill requests through [employer] or call your pharmacy and they will forward the refill request to us. Please allow 3 business days for your refill to be completed.          Additional Information About Your Visit        [employer] Information     [employer] lets you send messages to your doctor, view your test results, renew your prescriptions, schedule appointments and more. To sign up, go to [email or web address] . Click on \"Log in\" on the left side of the screen, which will take you to the Welcome page. Then click on \"Sign up Now\" on the right side of the page.     You will be asked to enter the access code listed below, as well as some personal information. Please follow the directions to create your username and password.     Your access code is: [email or web address]  Expires: 2018  8:38 AM     Your access code will  in 90 days. If you need help or a new code, please call your [place] clinic or [phone].        Care EveryWhere ID     This is your Care EveryWhere ID. This could be used by other organizations to access your [place] medical records  [id number]        Your Vitals Were     Last Period                   2009            Blood Pressure from Last 3 Encounters:   17 110/74   10/19/17 118/75   10/18/17 125/81    Weight from Last 3 Encounters:   17 81.4 kg (179 lb 6 oz)   10/19/17 84.2 kg (185 lb 9.6 oz)   10/16/17 84.2 kg (185 lb 11.2 oz)              Today, you had the following     No orders found for display         Today's Medication Changes          These changes are accurate as of: 17  8:03 PM.  If you have any questions, ask your nurse or doctor.               These medicines have changed or have updated prescriptions.        Dose/Directions    metFORMIN 500 MG 24 hr tablet   Commonly known as:  GLUCOPHAGE-XR   This may have changed: "    - how much to take  - when to take this  - additional instructions   Used for:  Type 2 diabetes mellitus without complication, without long-term current use of insulin (H)        Take two tabs by mouth once daily with evening meal.   Quantity:  180 tablet   Refills:  PRN                Primary Care Provider Office Phone # Fax #    Morelia AyonATTILA [phone] [address] [facility] [address]        Equal Access to Services     [facility] : Hadii aad ku hadasho Soomaali, waaxda luqadaha, qaybta kaalmada adeegyada, waxay idiin hayaan adeeg kharash la'alessandro . [facility] [phone].    ATENCIÓN: Si leanala esptere, tiene a martínez disposición servicios gratuitos de asistencia lingüística. [facility] [phone].    We comply with applicable federal civil rights laws and [place] laws. We do not discriminate on the basis of race, color, national origin, age, disability, sex, sexual orientation, or gender identity.            Thank you!     Thank you for choosing [facility]  for your care. Our goal is always to provide you with excellent care. Hearing back from our patients is one way we can continue to improve our services. Please take a few minutes to complete the written survey that you may receive in the mail after your visit with us. Thank you!             Your Updated Medication List - Protect others around you: Learn how to safely use, store and throw away your medicines at www.disposemymeds.org.          This list is accurate as of: 11/16/17  8:03 PM.  Always use your most recent med list.                   Brand Name Dispense Instructions for use Diagnosis    amoxicillin-clavulanate 875-125 MG per tablet    AUGMENTIN    42 tablet    Take 1 tablet by mouth 2 times daily    Chronic maxillary sinusitis       atorvastatin 80 MG tablet    LIPITOR    90 tablet    Take 1 tablet (80 mg) by mouth daily    Hyperlipidemia LDL goal <100       blood glucose lancets standard    no  brand specified    100 each    Use to test blood sugar 2 times daily or as directed.    Type 2 diabetes mellitus without complication, without long-term current use of insulin (H)       blood glucose monitoring meter device kit    no brand specified    1 kit    Use to test blood sugar 2 times daily or as directed.    Type 2 diabetes mellitus without complication, without long-term current use of insulin (H)       blood glucose monitoring test strip    no brand specified    100 strip    Use to test blood sugars 2 times daily or as directed    Type 2 diabetes mellitus without complication, without long-term current use of insulin (H)       CALCIUM-MAGNESIUM-VITAMIN D PO      Take 2 tablets by mouth daily        EPINEPHrine 0.3 MG/0.3ML injection 2-pack    EPIPEN/ADRENACLICK/or ANY BX GENERIC EQUIV    1 each    Inject 0.3 mLs (0.3 mg) into the muscle once as needed for anaphylaxis    Bee allergy status       escitalopram 20 MG tablet    LEXAPRO    30 tablet    Take 1 tablet (20 mg) by mouth daily    Major depressive disorder, recurrent episode, mild (H)       fluticasone 50 MCG/ACT spray    FLONASE    48 mL    SHAKE WELL AND USE 2 SPRAYS IN EACH NOSTRIL DAILY    Chronic maxillary sinusitis       gabapentin 100 MG capsule    NEURONTIN    42 capsule    Take 1-2 capsules (100-200 mg) by mouth 3 times daily Start the day after chemo for one week    Pain in joint, multiple sites       L-GLUTAMINE PO      Take 2 g by mouth 2 times daily        lisinopril 10 MG tablet    PRINIVIL/ZESTRIL    90 tablet    Take 1 tablet (10 mg) by mouth daily    Essential hypertension       loratadine 10 MG tablet    CLARITIN    30 tablet    Take 1 tablet (10 mg) by mouth daily    Pain in joint, multiple sites       LORazepam 1 MG tablet    ATIVAN    30 tablet    Take 1 tablet (1 mg) by mouth every 6 hours as needed (nausea/vomiting, anxiety or sleep)    Ovarian cancer, left (H), Encounter for long-term (current) use of medications        "metFORMIN 500 MG 24 hr tablet    GLUCOPHAGE-XR    180 tablet    Take two tabs by mouth once daily with evening meal.    Type 2 diabetes mellitus without complication, without long-term current use of insulin (H)       MULTIVITAMIN ADULT PO      Take 1 tablet by mouth daily        order for DME     1 Units    Equipment being ordered: blood pressure monitor    Essential hypertension       * order for DME     1 each    Compression stockings 20-30 mm Hg. To be wear when directed by Hematology team and while awake for the first two years post clot.    Long-term (current) use of anticoagulants, Acute deep vein thrombosis (DVT) of left lower extremity, unspecified vein (H)       * order for DME     1 Bottle    Plain packing strip 1/4\"    Abscess of leg       * order for DME     1 Box    Sterile zoey tipped applicators    Abscess of leg       * order for DME     3 Bottle    USE 1/4 INCH  WIDTH PLAIN NU GAUZE PACKING TO DO SELF WOUND CARE OF LEFT LOWER LEG ONCE DAILY. DIMENSIONS OF WOUND PRESENTLY ARE 2 X 2 INCH AND APPROXIMATELY 1/2 INCH DEEP.  USES 6 INCHES OF PACKING CURRENTLY FOR DRESSING CHANGE.  FAX TO [employer] [phone]    Wound of left leg       * order for DME     30 pad    STERILE ADHESIVE PAD BANDAGE AT LEAST 4X4 INCH IN SIZE NEEDED FOR DAILY WOUND CARE TO LEFT LOWER LEG. FAX TO [employer] [phone]    Leg wound, left       * order for DME     1 Units    Home blood pressure monitor    Type 2 diabetes mellitus without complication, without long-term current use of insulin (H)       PRO-BIOTIC BLEND PO      Take 1 capsule by mouth daily Enzymatic Therapy-probiotic pearls        prochlorperazine 10 MG tablet    COMPAZINE    30 tablet    Take 1 tablet (10 mg) by mouth every 6 hours as needed (nausea/vomiting)    Ovarian cancer, left (H), Encounter for long-term (current) use of medications       rivaroxaban ANTICOAGULANT 20 MG Tabs tablet    XARELTO    30 tablet    Take 1 tablet (20 mg) by mouth " daily (with dinner)    Long-term (current) use of anticoagulants, Acute deep vein thrombosis (DVT) of left lower extremity, unspecified vein (H)       traZODone 50 MG tablet    DESYREL    180 tablet    TAKE 1 TO 2 TABLETS(50  MG) BY MOUTH EVERY NIGHT AS NEEDED FOR SLEEP    Insomnia, unspecified type       VITAMIN B-COMPLEX PO           vitamin D 1000 UNITS capsule      Take 2,000 Units by mouth daily        VYVANSE PO      Take 50 mg by mouth daily        * Notice:  This list has 6 medication(s) that are the same as other medications prescribed for you. Read the directions carefully, and ask your doctor or other care provider to review them with you.

## 2017-11-16 NOTE — TELEPHONE ENCOUNTER
Pt is calling.  States that she would like to speak with Augustina Noriega NP, as soon as possible. States that she has more information for provider regarding the kidney mass that was seen on recent CT scan. Also states that she is scheduled for another CT scan this afternoon.  Will route message to Augustina Noriega NP.  Steph Oneil RN BSN

## 2017-11-16 NOTE — TELEPHONE ENCOUNTER
Called Hafsa back to discuss her concerns regarding findings on her CT scan. Answered questions to the best of my ability.  MARLON Ogden, FNP-C  Gynecologic Oncology  [facility]  Pager: [phone]

## 2017-11-17 DIAGNOSIS — N28.89 RENAL MASS, RIGHT: Primary | ICD-10-CM

## 2017-11-17 NOTE — PROGRESS NOTES
Left message for Hafsa that I would like to speak with her regarding renal CT. She will need to see urology for further evaluation- did not leave that information on her voicemail but order has been placed. Will attempt to connect with her again this morning. Number left for callback.  MARLON Ogden, FNP-C  Gynecologic Oncology  [facility]  Pager: [phone]

## 2017-11-20 ENCOUNTER — ONCOLOGY VISIT (OUTPATIENT)
Dept: ONCOLOGY | Facility: CLINIC | Age: 62
End: 2017-11-20
Attending: OBSTETRICS & GYNECOLOGY
Payer: COMMERCIAL

## 2017-11-20 VITALS
SYSTOLIC BLOOD PRESSURE: 134 MMHG | WEIGHT: 189.2 LBS | DIASTOLIC BLOOD PRESSURE: 87 MMHG | OXYGEN SATURATION: 97 % | RESPIRATION RATE: 16 BRPM | HEART RATE: 87 BPM | BODY MASS INDEX: 33.52 KG/M2 | TEMPERATURE: 97.7 F

## 2017-11-20 DIAGNOSIS — C56.9 MALIGNANT NEOPLASM OF OVARY, UNSPECIFIED LATERALITY (H): Primary | ICD-10-CM

## 2017-11-20 PROCEDURE — 40000114 ZZH STATISTIC NO CHARGE CLINIC VISIT

## 2017-11-20 PROCEDURE — 99215 OFFICE O/P EST HI 40 MIN: CPT | Mod: ZP | Performed by: OBSTETRICS & GYNECOLOGY

## 2017-11-20 ASSESSMENT — PAIN SCALES - GENERAL: PAINLEVEL: NO PAIN (0)

## 2017-11-20 NOTE — LETTER
2017       RE: Hafsa Corona  [address]  [place] [address]     Dear Colleague,    Thank you for referring your patient, Hafsa Corona, to the [facility]. Please see a copy of my visit note below.                Follow Up Notes on Referred Patient    Date: 2017       Dr. Morelia Ayon, NP  [address]  [place] [address]       RE: Hafsa Corona  : 1955  CLIFF: 2017    Dear Dr. 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             The patient presents today for followup.  She has been doing well.  No nausea or vomiting, fever or chills.  Normal urinary and bowel function.  No vaginal bleeding.  No B symptoms.         Past Medical History:    Past Medical History:   Diagnosis Date     Anxiety state, unspecified     [id number]     Attention deficit disorder without mention of hyperactivity      Chronic rhinitis      Depressive disorder, not elsewhere classified      Panic disorder without agoraphobia      Pure hypercholesterolemia     Lipitor     Tachycardia, unspecified     1999-2004     Type II or unspecified type diabetes mellitus without mention of complication, not stated as uncontrolled     diagnosed          Past Surgical History:    Past Surgical History:   Procedure Laterality Date     HYSTERECTOMY TOTAL ABDOMINAL, BILATERAL SALPINGO-OOPHORECTOMY, NODE DISSECTION, COMBINED Left 2017    Procedure: COMBINED HYSTERECTOMY TOTAL ABDOMINAL, SALPINGO-OOPHORECTOMY, NODE DISSECTION;  Exploratory Laparotomy, Left Salpingo-Oophorectomy, Cancer Staging, Total Hysterectomy, Omentectomy, Evacuation of abdominal fluid, Lymph Node Dissection  Anesthesia Block ;  Surgeon: Serena Cole MD;  Location: [facility] OR     HYSTERECTOMY, PAP NO LONGER INDICATED  2017    Laparotomy; for ovarian cancer staging     INSERT PORT VASCULAR ACCESS Right 2017     Procedure: INSERT PORT VASCULAR ACCESS;  Single Lumen Chest Power Port;  Surgeon: Stephen Mike PA-C;  [facility]     LYMPHADENECTOMY RETROPERITONEAL Bilateral 07/07/2017    Laparotomy; pelvics & para-aortics; for ovarian cancer staging     OMENTECTOMY  07/07/2017    Laparotomy; for ovarian cancer staging     SALPINGO OOPHORECTOMY,R/L/KAYY Right 2008    Salpingo Oophorectomy, RT     SALPINGO OOPHORECTOMY,R/L/KAYY Left 07/07/2017    Laparotomy; for ovarian cancer staging     SURGICAL HISTORY OF -       facial surgery d/t fall in 1/2002     SURGICAL HISTORY OF -       1985 removal of breast cyst     SURGICAL HISTORY OF -   2008    endometrial ablation         Health Maintenance Due   Topic Date Due     ADVANCE DIRECTIVE PLANNING Q5 YRS  03/09/2009     EYE EXAM Q1 YEAR  07/01/2017     PAP SCREENING Q3 YR (SYSTEM ASSIGNED)  12/01/2017       Current Medications:     Current Outpatient Prescriptions   Medication Sig Dispense Refill     B Complex Vitamins (VITAMIN B-COMPLEX PO)        blood glucose (NO BRAND SPECIFIED) lancets standard Use to test blood sugar 2 times daily or as directed. 100 each 11     blood glucose monitoring (NO BRAND SPECIFIED) meter device kit Use to test blood sugar 2 times daily or as directed. 1 kit 0     blood glucose monitoring (NO BRAND SPECIFIED) test strip Use to test blood sugars 2 times daily or as directed 100 strip PRN     fluticasone (FLONASE) 50 MCG/ACT spray SHAKE WELL AND USE 2 SPRAYS IN EACH NOSTRIL DAILY 48 mL PRN     LORazepam (ATIVAN) 1 MG tablet Take 1 tablet (1 mg) by mouth every 6 hours as needed (nausea/vomiting, anxiety or sleep) 30 tablet 1     loratadine (CLARITIN) 10 MG tablet Take 1 tablet (10 mg) by mouth daily 30 tablet 1     order for DME STERILE ADHESIVE PAD BANDAGE AT LEAST 4X4 INCH IN SIZE NEEDED FOR DAILY WOUND CARE TO LEFT LOWER LEG.  FAX TO [employer] [phone] 30 pad 1     order for DME USE 1/4 INCH  WIDTH PLAIN NU GAUZE PACKING TO DO SELF  "WOUND CARE OF LEFT LOWER LEG ONCE DAILY.  DIMENSIONS OF WOUND PRESENTLY ARE 2 X 2 INCH AND APPROXIMATELY 1/2 INCH DEEP.  USES 6 INCHES OF PACKING CURRENTLY FOR DRESSING CHANGE.    [phone] Bottle 2     rivaroxaban ANTICOAGULANT (XARELTO) 20 MG TABS tablet Take 1 tablet (20 mg) by mouth daily (with dinner) 30 tablet 11     traZODone (DESYREL) 50 MG tablet TAKE 1 TO 2 TABLETS(50  MG) BY MOUTH EVERY NIGHT AS NEEDED FOR SLEEP 180 tablet 1     order for DME Plain packing strip 1/4\" 1 Bottle PRN     order for DME Sterile zoey tipped applicators 1 Box PRN     Multiple Vitamins-Minerals (MULTIVITAMIN ADULT PO) Take 1 tablet by mouth daily       order for DME Compression stockings 20-30 mm Hg. To be wear when directed by Hematology team and while awake for the first two years post clot. 1 each 0     CALCIUM-MAGNESIUM-VITAMIN D PO Take 2 tablets by mouth daily       Probiotic Product (PRO-BIOTIC BLEND PO) Take 1 capsule by mouth daily Enzymatic Therapy-probiotic pearls       atorvastatin (LIPITOR) 80 MG tablet Take 1 tablet (80 mg) by mouth daily 90 tablet PRN     lisinopril (PRINIVIL/ZESTRIL) 10 MG tablet Take 1 tablet (10 mg) by mouth daily 90 tablet PRN     metFORMIN (GLUCOPHAGE-XR) 500 MG 24 hr tablet Take two tabs by mouth once daily with evening meal. (Patient taking differently: 1,000 mg daily (with dinner) Take two tabs by mouth once daily with evening meal.) 180 tablet PRN     escitalopram (LEXAPRO) 20 MG tablet Take 1 tablet (20 mg) by mouth daily 30 tablet PRN     EPINEPHrine (EPIPEN) 0.3 MG/0.3ML injection Inject 0.3 mLs (0.3 mg) into the muscle once as needed for anaphylaxis 1 each PRN     Cholecalciferol (VITAMIN D) 1000 UNIT capsule Take 2,000 Units by mouth daily        order for DME Home blood pressure monitor (Patient not taking: Reported on 11/20/2017) 1 Units 0     L-GLUTAMINE PO Take 2 g by mouth 2 times daily       gabapentin (NEURONTIN) 100 MG capsule Take 1-2 " capsules (100-200 mg) by mouth 3 times daily Start the day after chemo for one week (Patient not taking: Reported on 2017) 42 capsule 1     prochlorperazine (COMPAZINE) 10 MG tablet Take 1 tablet (10 mg) by mouth every 6 hours as needed (nausea/vomiting) (Patient not taking: Reported on 2017) 30 tablet 2     Lisdexamfetamine Dimesylate (VYVANSE PO) Take 50 mg by mouth daily       order for DME Equipment being ordered: blood pressure monitor (Patient not taking: Reported on 10/16/2017) 1 Units 0         Allergies:        Allergies   Allergen Reactions     Wasps [Hornets] Anaphylaxis     No Clinical Screening - See Comments      Taxol      Paclitaxel Difficulty breathing     Sulfa Drugs Hives        Social History:     Social History   Substance Use Topics     Smoking status: Never Smoker     Smokeless tobacco: Never Used     Alcohol use Yes      Comment: rarely       History   Drug Use No         Family History:         Family History   Problem Relation Age of Onset     C.A.D. Father      DIABETES Father      Hypertension Father      CEREBROVASCULAR DISEASE Father      Psychotic Disorder Father      Pancreatic Cancer Father      C.A.D. Mother      Hypertension Mother      Breast Cancer Mother      Psychotic Disorder Mother      Colon Cancer Mother      CANCER Mother      Bone cancer     Prostate Problems Brother      cleared      Psychotic Disorder Sister      x2     Neurologic Disorder Sister      Psychotic Disorder Brother      x2     Depression Brother      major depressive disorder and OCD     Anxiety Disorder Brother      MENTAL ILLNESS Brother      Psychotic Disorder Maternal Grandmother      ?     Psychotic Disorder Maternal Grandfather      ?     Psychotic Disorder Paternal Grandmother      Schizophernia     Psychotic Disorder Paternal Grandfather      ?     Respiratory Paternal Grandfather       of emphysema and smoking     Psychotic Disorder Sister      Other - See Comments Sister      small  kidney stone      Cancer - colorectal No family hx of          Physical Exam:     /87  Pulse 87  Temp 97.7  F (36.5  C) (Oral)  Resp 16  Wt 85.8 kg (189 lb 3.2 oz)  LMP 01/01/2009  SpO2 97%  BMI 33.52 kg/m2  Body mass index is 33.52 kg/(m^2).    General Appearance: healthy and alert, no distress     IMMUNOLOGIC:  No cervical, supra-, infraclavicular, axillary or inguinal lymphadenopathy.     ABDOMEN:  Soft, nontender and nondistended.  No organomegaly.  Well-healing midline incisions.     PELVIC:  Normal external genitalia.  Well-healed vaginal cuff.  No adnexal masses or tenderness.  Rectovaginal confirms.             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     A total of 60 minutes was spent with the patient,  40 minutes of which were spent in counseling the patient and/or treatment planning.      1.  Stage IC2 clear cell carcinoma of the ovary.   2.  New right renal mass.   3.   of 8.      Discussed with the patient that she has a normal .  No evidence of ovarian cancer recurrence besides the new renal mass which was not present back in July.  This is highly suspicious for primary renal cell carcinoma.  She will set up an appointment with [facility] with Dr. Dick for consultation and also have a referral here to see possibly Dr. Gregorio for a consultation in Urology Department.  Otherwise, patient will come back for ovarian cancer surveillance every 3-4 months for the first year, then every 6 months and then yearly from then on when she gets to year 5.  If this happened to be ovarian cancer recurrence, which is less likely especially as this is retroperitoneal, we will see her back for treatment.  Otherwise, we will see her for surveillance.  The patient agrees with the plan.  She is very appreciative of her care.  All questions were answered.       Jraod Shaffer MD, MS    Department  of Obstetrics and Gynecology   Division of Gynecologic Oncology   [facility]  Phone: [phone]        CC  Patient Care Team:  Morelia Ayon NP as PCP - General  Karla Oswald RN as Continuity Care Coordinator (Gyn-Onc)    Again, thank you for allowing me to participate in the care of your patient.      Sincerely,    Jarod Shaffer MD

## 2017-11-20 NOTE — MR AVS SNAPSHOT
After Visit Summary   11/20/2017    Hafsa Corona    MRN: [id number]           Patient Information     Date Of Birth          1955        Visit Information        Provider Department      11/20/2017 3:20 PM Jarod Shaffer MD [facility]        Today's Diagnoses     Malignant neoplasm of ovary, unspecified laterality (H)    -  1       Follow-ups after your visit        Your next 10 appointments already scheduled     Dec 01, 2017  7:30 AM CST   New Visit with Javier Gregorio MD   [facility] ([facility])    [facility]  [address]  [facility] [address]   [phone]            Dec 07, 2017 12:00 PM CST   (Arrive by 11:45 AM)   RETURN WITH ROOM with Lola Vasquez,  ,  2 118 CONSULT [facility] ([facility])    [address] [place] [address] [id number] [phone]            Dec 08, 2017  1:15 PM CST   (Arrive by 1:00 PM)   RETURN WITH ROOM with Mariam Sams GC,  2 116 CONSULT [facility] ([facility])    [address] [place] [address] [id number] [phone]            Dec 14, 2017 12:00 PM CST   (Arrive by 11:45 AM)   RETURN WITH ROOM with Lola Vasquez PhD ,  2 118 CONSULT [facility] ([facility])    [address] [place] [address] [id number] [phone]              Who to contact     If you have questions or need follow up information about today's clinic visit or your schedule please contact [facility] directly at [phone].  Normal or non-critical lab and imaging results will be communicated to you by MyChart, letter or phone within 4 business days after the clinic has received the results. If you do not hear from us within 7 days, please contact the  "clinic through [employer] or phone. If you have a critical or abnormal lab result, we will notify you by phone as soon as possible.  Submit refill requests through [employer] or call your pharmacy and they will forward the refill request to us. Please allow 3 business days for your refill to be completed.          Additional Information About Your Visit        [employer] Information     [employer] lets you send messages to your doctor, view your test results, renew your prescriptions, schedule appointments and more. To sign up, go to [email or web address] . Click on \"Log in\" on the left side of the screen, which will take you to the Welcome page. Then click on \"Sign up Now\" on the right side of the page.     You will be asked to enter the access code listed below, as well as some personal information. Please follow the directions to create your username and password.     Your access code is: [email or web address]  Expires: 2018  8:38 AM     Your access code will  in 90 days. If you need help or a new code, please call your [facility] clinic or [phone].        Care EveryWhere ID     This is your Care EveryWhere ID. This could be used by other organizations to access your [facility] medical records  [id number]        Your Vitals Were     Pulse Temperature Respirations Last Period Pulse Oximetry BMI (Body Mass Index)    87 97.7  F (36.5  C) (Oral) 16 2009 97% 33.52 kg/m2       Blood Pressure from Last 3 Encounters:   17 134/87   17 110/74   10/19/17 118/75    Weight from Last 3 Encounters:   17 85.8 kg (189 lb 3.2 oz)   17 81.4 kg (179 lb 6 oz)   10/19/17 84.2 kg (185 lb 9.6 oz)              Today, you had the following     No orders found for display         Today's Medication Changes          These changes are accurate as of: 17 11:59 PM.  If you have any questions, ask your nurse or doctor.               These medicines have changed or have updated prescriptions.        Dose/Directions "    metFORMIN 500 MG 24 hr tablet   Commonly known as:  GLUCOPHAGE-XR   This may have changed:    - how much to take  - when to take this  - additional instructions   Used for:  Type 2 diabetes mellitus without complication, without long-term current use of insulin (H)        Take two tabs by mouth once daily with evening meal.   Quantity:  180 tablet   Refills:  PRN                Primary Care Provider Office Phone # Fax #    Morelia Ayon, [phone] [address] [facility] [address]        Equal Access to Services     [facility] Francisco[facility]ULIO : Hadii aad ku hadasho Soomaali, waaxda luqadaha, qaybta kaalmada adeegyada, waxay mini hayalessandro betts . So [facility] [phone].    ATENCIÓN: Si habla español, tiene a martínez disposición servicios gratuitos de asistencia lingüística. LlCleveland Clinic Foundation [phone].    We comply with applicable federal civil rights laws and [place] laws. We do not discriminate on the basis of race, color, national origin, age, disability, sex, sexual orientation, or gender identity.            Thank you!     Thank you for choosing [facility]  for your care. Our goal is always to provide you with excellent care. Hearing back from our patients is one way we can continue to improve our services. Please take a few minutes to complete the written survey that you may receive in the mail after your visit with us. Thank you!             Your Updated Medication List - Protect others around you: Learn how to safely use, store and throw away your medicines at www.disposemymeds.org.          This list is accurate as of: 11/20/17 11:59 PM.  Always use your most recent med list.                   Brand Name Dispense Instructions for use Diagnosis    atorvastatin 80 MG tablet    LIPITOR    90 tablet    Take 1 tablet (80 mg) by mouth daily    Hyperlipidemia LDL goal <100       blood glucose lancets standard    no brand specified    100 each    Use to test blood sugar  2 times daily or as directed.    Type 2 diabetes mellitus without complication, without long-term current use of insulin (H)       blood glucose monitoring meter device kit    no brand specified    1 kit    Use to test blood sugar 2 times daily or as directed.    Type 2 diabetes mellitus without complication, without long-term current use of insulin (H)       blood glucose monitoring test strip    no brand specified    100 strip    Use to test blood sugars 2 times daily or as directed    Type 2 diabetes mellitus without complication, without long-term current use of insulin (H)       CALCIUM-MAGNESIUM-VITAMIN D PO      Take 2 tablets by mouth daily        EPINEPHrine 0.3 MG/0.3ML injection 2-pack    EPIPEN/ADRENACLICK/or ANY BX GENERIC EQUIV    1 each    Inject 0.3 mLs (0.3 mg) into the muscle once as needed for anaphylaxis    Bee allergy status       escitalopram 20 MG tablet    LEXAPRO    30 tablet    Take 1 tablet (20 mg) by mouth daily    Major depressive disorder, recurrent episode, mild (H)       fluticasone 50 MCG/ACT spray    FLONASE    48 mL    SHAKE WELL AND USE 2 SPRAYS IN EACH NOSTRIL DAILY    Chronic maxillary sinusitis       gabapentin 100 MG capsule    NEURONTIN    42 capsule    Take 1-2 capsules (100-200 mg) by mouth 3 times daily Start the day after chemo for one week    Pain in joint, multiple sites       L-GLUTAMINE PO      Take 2 g by mouth 2 times daily        lisinopril 10 MG tablet    PRINIVIL/ZESTRIL    90 tablet    Take 1 tablet (10 mg) by mouth daily    Essential hypertension       loratadine 10 MG tablet    CLARITIN    30 tablet    Take 1 tablet (10 mg) by mouth daily    Pain in joint, multiple sites       LORazepam 1 MG tablet    ATIVAN    30 tablet    Take 1 tablet (1 mg) by mouth every 6 hours as needed (nausea/vomiting, anxiety or sleep)    Ovarian cancer, left (H), Encounter for long-term (current) use of medications       metFORMIN 500 MG 24 hr tablet    GLUCOPHAGE-XR    180 tablet  "   Take two tabs by mouth once daily with evening meal.    Type 2 diabetes mellitus without complication, without long-term current use of insulin (H)       MULTIVITAMIN ADULT PO      Take 1 tablet by mouth daily        order for DME     1 Units    Equipment being ordered: blood pressure monitor    Essential hypertension       * order for DME     1 each    Compression stockings 20-30 mm Hg. To be wear when directed by Hematology team and while awake for the first two years post clot.    Long-term (current) use of anticoagulants, Acute deep vein thrombosis (DVT) of left lower extremity, unspecified vein (H)       * order for DME     1 Bottle    Plain packing strip 1/4\"    Abscess of leg       * order for DME     1 Box    Sterile zoey tipped applicators    Abscess of leg       * order for DME     3 Bottle    USE 1/4 INCH  WIDTH PLAIN NU GAUZE PACKING TO DO SELF WOUND CARE OF LEFT LOWER LEG ONCE DAILY. DIMENSIONS OF WOUND PRESENTLY ARE 2 X 2 INCH AND APPROXIMATELY 1/2 INCH DEEP.  USES 6 INCHES OF PACKING CURRENTLY FOR DRESSING CHANGE.  FAX TO [employer] [phone]    Wound of left leg       * order for DME     30 pad    STERILE ADHESIVE PAD BANDAGE AT LEAST 4X4 INCH IN SIZE NEEDED FOR DAILY WOUND CARE TO LEFT LOWER LEG. FAX TO [employer] [phone]    Leg wound, left       * order for DME     1 Units    Home blood pressure monitor    Type 2 diabetes mellitus without complication, without long-term current use of insulin (H)       PRO-BIOTIC BLEND PO      Take 1 capsule by mouth daily Enzymatic Therapy-probiotic pearls        prochlorperazine 10 MG tablet    COMPAZINE    30 tablet    Take 1 tablet (10 mg) by mouth every 6 hours as needed (nausea/vomiting)    Ovarian cancer, left (H), Encounter for long-term (current) use of medications       rivaroxaban ANTICOAGULANT 20 MG Tabs tablet    XARELTO    30 tablet    Take 1 tablet (20 mg) by mouth daily (with dinner)    Long-term (current) use of " anticoagulants, Acute deep vein thrombosis (DVT) of left lower extremity, unspecified vein (H)       traZODone 50 MG tablet    DESYREL    180 tablet    TAKE 1 TO 2 TABLETS(50  MG) BY MOUTH EVERY NIGHT AS NEEDED FOR SLEEP    Insomnia, unspecified type       VITAMIN B-COMPLEX PO           vitamin D 1000 UNITS capsule      Take 2,000 Units by mouth daily        VYVANSE PO      Take 50 mg by mouth daily        * Notice:  This list has 6 medication(s) that are the same as other medications prescribed for you. Read the directions carefully, and ask your doctor or other care provider to review them with you.

## 2017-11-21 ENCOUNTER — TELEPHONE (OUTPATIENT)
Dept: CARE COORDINATION | Facility: CLINIC | Age: 62
End: 2017-11-21

## 2017-11-21 NOTE — TELEPHONE ENCOUNTER
"Oncology Distress Screening Follow-up  Clinical Social Work  [facility]    Identified Concern and Score From Distress Screening: On 2017, pt scored a 9/10 on the Oncology Distress Screening Question \"How concerned are you about work and home life issues that may be affected by your cancer?\" and pt requested to speak with a .    Date of Distress Screenin2017    Data: Pt is a 62 year old female with hx of Ovarian Cancer.    Intervention: On 2017, ZACARIAS followed up with pt (P: [phone]) to discuss concern. SW introduced self/role. Pt shared she was actually just on the phone with the RN Care Coordinator at the [facility] and she switched over because she thought it was another call she was expecting. Pt said she does want to talk with a  about financial assistance and community resources/support. Pt said her mental health is also \"up and down\" which she is seeing someone for MH support; however, pt said today it is up with everything considering (pt said she has a new cancer diagnosis on top of her previous cancer diagnosis). Pt said now is not a good time to talk and asked if she could call back after this week. ZACRAIAS provided pt with Oncology Social WorkerMariam Jacobs's contact information (P: [phone]). Pt does not have any further questions or concerns at this time. Pt said she would call Sarkis next week. SW provided empathetic listening, validation of concerns, and encouragement. SW encouraged pt to contact  for support, questions and/or resources.     Education Provided: Self-Care Education and Oncology Social Work Contact Information (P: [phone])    Follow-up Required:None needed at this time.     MARY Brandon, LGZACARIAS  "

## 2017-11-21 NOTE — PROGRESS NOTES
Follow Up Notes on Referred Patient    Date: 2017       Dr. Morelia Ayon, NP  [address] [place] [address]       RE: Hafsa Corona  : 1955  CLIFF: 2017    Dear Dr. 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             The patient presents today for followup.  She has been doing well.  No nausea or vomiting, fever or chills.  Normal urinary and bowel function.  No vaginal bleeding.  No B symptoms.         Past Medical History:    Past Medical History:   Diagnosis Date     Anxiety state, unspecified     [id number]     Attention deficit disorder without mention of hyperactivity      Chronic rhinitis      Depressive disorder, not elsewhere classified      Panic disorder without agoraphobia      Pure hypercholesterolemia     Lipitor     Tachycardia, unspecified     1999-2004     Type II or unspecified type diabetes mellitus without mention of complication, not stated as uncontrolled     diagnosed          Past Surgical History:    Past Surgical History:   Procedure Laterality Date     HYSTERECTOMY TOTAL ABDOMINAL, BILATERAL SALPINGO-OOPHORECTOMY, NODE DISSECTION, COMBINED Left 2017    Procedure: COMBINED HYSTERECTOMY TOTAL ABDOMINAL, SALPINGO-OOPHORECTOMY, NODE DISSECTION;  Exploratory Laparotomy, Left Salpingo-Oophorectomy, Cancer Staging, Total Hysterectomy, Omentectomy, Evacuation of abdominal fluid, Lymph Node Dissection  Anesthesia Block ;  Surgeon: Serena Cole MD;  Location: [facility] OR     HYSTERECTOMY, PAP NO LONGER INDICATED  2017    Laparotomy; for ovarian cancer staging     INSERT PORT VASCULAR ACCESS Right 2017    Procedure: INSERT PORT VASCULAR ACCESS;  Single Lumen Chest Power Port;  Surgeon: Stephen Mike PA-C;  Location: [facility] OR     LYMPHADENECTOMY RETROPERITONEAL Bilateral 2017    Laparotomy; pelvics & para-aortics; for ovarian cancer  staging     OMENTECTOMY  07/07/2017    Laparotomy; for ovarian cancer staging     SALPINGO OOPHORECTOMY,R/L/KAYY Right 2008    Salpingo Oophorectomy, RT     SALPINGO OOPHORECTOMY,R/L/KAYY Left 07/07/2017    Laparotomy; for ovarian cancer staging     SURGICAL HISTORY OF -       facial surgery d/t fall in 1/2002     SURGICAL HISTORY OF -       1985 removal of breast cyst     SURGICAL HISTORY OF -   2008    endometrial ablation         Health Maintenance Due   Topic Date Due     ADVANCE DIRECTIVE PLANNING Q5 YRS  03/09/2009     EYE EXAM Q1 YEAR  07/01/2017     PAP SCREENING Q3 YR (SYSTEM ASSIGNED)  12/01/2017       Current Medications:     Current Outpatient Prescriptions   Medication Sig Dispense Refill     B Complex Vitamins (VITAMIN B-COMPLEX PO)        blood glucose (NO BRAND SPECIFIED) lancets standard Use to test blood sugar 2 times daily or as directed. 100 each 11     blood glucose monitoring (NO BRAND SPECIFIED) meter device kit Use to test blood sugar 2 times daily or as directed. 1 kit 0     blood glucose monitoring (NO BRAND SPECIFIED) test strip Use to test blood sugars 2 times daily or as directed 100 strip PRN     fluticasone (FLONASE) 50 MCG/ACT spray SHAKE WELL AND USE 2 SPRAYS IN EACH NOSTRIL DAILY 48 mL PRN     LORazepam (ATIVAN) 1 MG tablet Take 1 tablet (1 mg) by mouth every 6 hours as needed (nausea/vomiting, anxiety or sleep) 30 tablet 1     loratadine (CLARITIN) 10 MG tablet Take 1 tablet (10 mg) by mouth daily 30 tablet 1     order for DME STERILE ADHESIVE PAD BANDAGE AT LEAST 4X4 INCH IN SIZE NEEDED FOR DAILY WOUND CARE TO LEFT LOWER LEG.  FAX TO [employer] [phone] 30 pad 1     order for DME USE 1/4 INCH  WIDTH PLAIN NU GAUZE PACKING TO DO SELF WOUND CARE OF LEFT LOWER LEG ONCE DAILY.  DIMENSIONS OF WOUND PRESENTLY ARE 2 X 2 INCH AND APPROXIMATELY 1/2 INCH DEEP.  USES 6 INCHES OF PACKING CURRENTLY FOR DRESSING CHANGE.    FAX TO [employer] [phone] 3 Bottle 2     rivaroxaban  "ANTICOAGULANT (XARELTO) 20 MG TABS tablet Take 1 tablet (20 mg) by mouth daily (with dinner) 30 tablet 11     traZODone (DESYREL) 50 MG tablet TAKE 1 TO 2 TABLETS(50  MG) BY MOUTH EVERY NIGHT AS NEEDED FOR SLEEP 180 tablet 1     order for DME Plain packing strip 1/4\" 1 Bottle PRN     order for DME Sterile zoey tipped applicators 1 Box PRN     Multiple Vitamins-Minerals (MULTIVITAMIN ADULT PO) Take 1 tablet by mouth daily       order for DME Compression stockings 20-30 mm Hg. To be wear when directed by Hematology team and while awake for the first two years post clot. 1 each 0     CALCIUM-MAGNESIUM-VITAMIN D PO Take 2 tablets by mouth daily       Probiotic Product (PRO-BIOTIC BLEND PO) Take 1 capsule by mouth daily Enzymatic Therapy-probiotic pearls       atorvastatin (LIPITOR) 80 MG tablet Take 1 tablet (80 mg) by mouth daily 90 tablet PRN     lisinopril (PRINIVIL/ZESTRIL) 10 MG tablet Take 1 tablet (10 mg) by mouth daily 90 tablet PRN     metFORMIN (GLUCOPHAGE-XR) 500 MG 24 hr tablet Take two tabs by mouth once daily with evening meal. (Patient taking differently: 1,000 mg daily (with dinner) Take two tabs by mouth once daily with evening meal.) 180 tablet PRN     escitalopram (LEXAPRO) 20 MG tablet Take 1 tablet (20 mg) by mouth daily 30 tablet PRN     EPINEPHrine (EPIPEN) 0.3 MG/0.3ML injection Inject 0.3 mLs (0.3 mg) into the muscle once as needed for anaphylaxis 1 each PRN     Cholecalciferol (VITAMIN D) 1000 UNIT capsule Take 2,000 Units by mouth daily        order for DME Home blood pressure monitor (Patient not taking: Reported on 11/20/2017) 1 Units 0     L-GLUTAMINE PO Take 2 g by mouth 2 times daily       gabapentin (NEURONTIN) 100 MG capsule Take 1-2 capsules (100-200 mg) by mouth 3 times daily Start the day after chemo for one week (Patient not taking: Reported on 11/1/2017) 42 capsule 1     prochlorperazine (COMPAZINE) 10 MG tablet Take 1 tablet (10 mg) by mouth every 6 hours as needed " (nausea/vomiting) (Patient not taking: Reported on 2017) 30 tablet 2     Lisdexamfetamine Dimesylate (VYVANSE PO) Take 50 mg by mouth daily       order for DME Equipment being ordered: blood pressure monitor (Patient not taking: Reported on 10/16/2017) 1 Units 0         Allergies:        Allergies   Allergen Reactions     Wasps [Hornets] Anaphylaxis     No Clinical Screening - See Comments      Taxol      Paclitaxel Difficulty breathing     Sulfa Drugs Hives        Social History:     Social History   Substance Use Topics     Smoking status: Never Smoker     Smokeless tobacco: Never Used     Alcohol use Yes      Comment: rarely       History   Drug Use No         Family History:         Family History   Problem Relation Age of Onset     C.A.D. Father      DIABETES Father      Hypertension Father      CEREBROVASCULAR DISEASE Father      Psychotic Disorder Father      Pancreatic Cancer Father      C.A.D. Mother      Hypertension Mother      Breast Cancer Mother      Psychotic Disorder Mother      Colon Cancer Mother      CANCER Mother      Bone cancer     Prostate Problems Brother      cleared      Psychotic Disorder Sister      x2     Neurologic Disorder Sister      Psychotic Disorder Brother      x2     Depression Brother      major depressive disorder and OCD     Anxiety Disorder Brother      MENTAL ILLNESS Brother      Psychotic Disorder Maternal Grandmother      ?     Psychotic Disorder Maternal Grandfather      ?     Psychotic Disorder Paternal Grandmother      Schizophernia     Psychotic Disorder Paternal Grandfather      ?     Respiratory Paternal Grandfather       of emphysema and smoking     Psychotic Disorder Sister      Other - See Comments Sister      small kidney stone      Cancer - colorectal No family hx of          Physical Exam:     /87  Pulse 87  Temp 97.7  F (36.5  C) (Oral)  Resp 16  Wt 85.8 kg (189 lb 3.2 oz)  LMP 2009  SpO2 97%  BMI 33.52 kg/m2  Body mass index is  33.52 kg/(m^2).    General Appearance: healthy and alert, no distress     IMMUNOLOGIC:  No cervical, supra-, infraclavicular, axillary or inguinal lymphadenopathy.     ABDOMEN:  Soft, nontender and nondistended.  No organomegaly.  Well-healing midline incisions.     PELVIC:  Normal external genitalia.  Well-healed vaginal cuff.  No adnexal masses or tenderness.  Rectovaginal confirms.             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     A total of 60 minutes was spent with the patient,  40 minutes of which were spent in counseling the patient and/or treatment planning.      1.  Stage IC2 clear cell carcinoma of the ovary.   2.  New right renal mass.   3.   of 8.      Discussed with the patient that she has a normal .  No evidence of ovarian cancer recurrence besides the new renal mass which was not present back in July.  This is highly suspicious for primary renal cell carcinoma.  She will set up an appointment with [facility] with Dr. Dick for consultation and also have a referral here to see possibly Dr. Gregorio for a consultation in Urology Department.  Otherwise, patient will come back for ovarian cancer surveillance every 3-4 months for the first year, then every 6 months and then yearly from then on when she gets to year 5.  If this happened to be ovarian cancer recurrence, which is less likely especially as this is retroperitoneal, we will see her back for treatment.  Otherwise, we will see her for surveillance.  The patient agrees with the plan.  She is very appreciative of her care.  All questions were answered.       Jarod Shaffer MD, MS    Department of Obstetrics and Gynecology   Division of Gynecologic Oncology   [facility]  Phone: [phone]        CC  Patient Care Team:  Morelia Ayon NP as PCP - Karla Perez RN as Continuity Care Coordinator  (Gyn-Onc)  WHIT ACEVEDO

## 2017-11-22 ENCOUNTER — TELEPHONE (OUTPATIENT)
Dept: ONCOLOGY | Facility: CLINIC | Age: 62
End: 2017-11-22

## 2017-11-22 NOTE — TELEPHONE ENCOUNTER
Patient called RN and requested records be sent to [facility] as she is seeking a second opinion.     All needed records and imaging sent.    [facility]  [phone] (ph)  [phone] (fax)    Patient ID # [id number]    Patient was informed this was completed. Patient grateful for RN time.    Morelia Lehman RN

## 2017-11-26 LAB — LAB SCANNED RESULT: NORMAL

## 2017-11-30 ENCOUNTER — TELEPHONE (OUTPATIENT)
Dept: ONCOLOGY | Facility: CLINIC | Age: 62
End: 2017-11-30

## 2017-11-30 ENCOUNTER — OFFICE VISIT (OUTPATIENT)
Dept: PSYCHOLOGY | Facility: CLINIC | Age: 62
End: 2017-11-30
Attending: PSYCHOLOGIST
Payer: COMMERCIAL

## 2017-11-30 DIAGNOSIS — F43.20 ADJUSTMENT DISORDER, UNSPECIFIED TYPE: Primary | ICD-10-CM

## 2017-11-30 PROCEDURE — 40000114 ZZH STATISTIC NO CHARGE CLINIC VISIT

## 2017-11-30 PROCEDURE — 90834 PSYTX W PT 45 MINUTES: CPT | Mod: ZP | Performed by: PSYCHOLOGIST

## 2017-11-30 NOTE — TELEPHONE ENCOUNTER
Phoned patient today for Pre-visit welcome call.  Patient was not available.  Confirmed appt.      Scheduled for:  Date:  12/1/17  Time: 0730  with Dr.:  MD Dominic    Patient requested:  Please check in at [address]  [facility] [place].  Please Arrive 15 minutes before your appointment time.    Please Bring:  Insurance Card(s)  Photo ID    Patient is a  [facility] patient and all records, pathology reports, surgical reports are in EMR.  MD will be able to preview chart prior to visit.  Patient is aware that parking is free in both an open or covered lot.  First visit will take last approx 30-60 min.   may want labs drawn which we can do in our clinic or if he orders any procedures/scans we can also facilitate further scheduling.  Feel free to bring along family/SO /friends to your appt to help absorb all  Information.  The clinic is open M-F 8-4:30pm and I am here the same hours.  If you have any questions, please don't hesitate to call and ask for Dr. Gregorio's Patient Care Coordinator [place] or me @ [phone] option 3.   Fer Mejia, RN, BSN, OCN  [facility]  Patient Care Coordinator

## 2017-11-30 NOTE — MR AVS SNAPSHOT
After Visit Summary   11/30/2017    Hafsa Corona    MRN: [id number]           Patient Information     Date Of Birth          1955        Visit Information        Provider Department      11/30/2017 11:00 AM Lola Vasquez PhD LP; [facility] 2 116 CONSULT Critical access hospital [facility]        Today's Diagnoses     Adjustment disorder, unspecified type    -  1       Follow-ups after your visit        Your next 10 appointments already scheduled     Dec 01, 2017  7:30 AM CST   New Visit with Javier Gregorio MD   [facility] ([facility])    [facility]  [address]  [facility] [address]   [phone]            Dec 07, 2017 12:00 PM CST   (Arrive by 11:45 AM)   RETURN WITH ROOM with Lola Vasquez PhD LP, UC 2 118 CONSULT Critical access hospital Cancer [facility] ([facility])    [address]  [address] [facility] [address] [phone]            Dec 08, 2017  1:15 PM CST   (Arrive by 1:00 PM)   RETURN WITH ROOM with Mariam Sams GC, UC 2 116 CONSULT Critical access hospital Cancer [facility] ([facility])    [address]  [address] [facility] [address] [phone]            Dec 14, 2017 12:00 PM CST   (Arrive by 11:45 AM)   RETURN WITH ROOM with Lola Vasquez PhD LP, UC 2 118 CONSULT Critical access hospital [facility] ([facility])    [address]  2nd Floor  [facility] [address] [phone]            Jan 04, 2018 12:00 PM CST   (Arrive by 11:45 AM)   RETURN WITH ROOM with Lola Vasquez PhD LP, UC 2 114 CONSULT Critical access hospital Cancer [facility] ([facility])    [address]  [address] [facility] [address] [phone]            Jan 11, 2018 12:00 PM CST   (Arrive by 11:45 AM)   RETURN WITH ROOM with Lola Vasquez PhD LP   [facility] ([facility])    [address]  [address] [facility] [address] [phone]              Who to contact     If you have questions or need follow up information about today's clinic visit or your schedule please contact [facility] directly at [phone].  Normal or non-critical lab and imaging results will be communicated to you by MyChart, letter or phone within 4 business days after the clinic has received the results. If you do not hear from us within 7 days, please contact the clinic through MyChart or phone. If you have a critical or abnormal lab result, we will notify you by phone as soon as possible.  Submit refill requests through [employer] or call your pharmacy and they will forward the refill request to us. Please allow 3 business days for your refill to be completed.          Additional Information About Your Visit        MyChart Information     [employer] lets you send messages to your doctor, view your test results, renew your prescriptions, schedule appointments and more. To sign up, go to [email or web address] . Click on \"Log in\" on the left side of the screen, which will take you to the Welcome page. Then click on \"Sign up Now\" on the right side of the page.     You will be asked to enter the access code listed below, as well as some personal information. Please follow the directions to create your username and password.     Your access code is: [email or web address]  Expires: 2018  8:38 AM     Your access code will  in 90 days. If you need help or a new code, please call your [place] clinic or [phone].        Care EveryWhere ID     This is your Care EveryWhere ID. This could be used by other organizations to access your [place] medical records  [id number]        Your Vitals Were     Last Period                   2009            Blood Pressure from Last 3 Encounters:   17 134/87   17 110/74   10/19/17 118/75    Weight from Last 3 " Encounters:   11/20/17 85.8 kg (189 lb 3.2 oz)   11/01/17 81.4 kg (179 lb 6 oz)   10/19/17 84.2 kg (185 lb 9.6 oz)              Today, you had the following     No orders found for display         Today's Medication Changes          These changes are accurate as of: 11/30/17  7:38 PM.  If you have any questions, ask your nurse or doctor.               These medicines have changed or have updated prescriptions.        Dose/Directions    metFORMIN 500 MG 24 hr tablet   Commonly known as:  GLUCOPHAGE-XR   This may have changed:    - how much to take  - when to take this  - additional instructions   Used for:  Type 2 diabetes mellitus without complication, without long-term current use of insulin (H)        Take two tabs by mouth once daily with evening meal.   Quantity:  180 tablet   Refills:  PRN                Primary Care Provider Office Phone # Fax #    Morelia JAVIER Ayon [phone] [address] [facility] [address]        Equal Access to Services     [facility] MANOJ : Hadii aad ku hadasho Soomaali, waaxda luqadaha, qaybta kaalmada adeegyada, waxay marlenyin hayalessandro betts . So [facility] [phone].    ATENCIÓN: Si habla español, tiene a martínez disposición servicios gratuitos de asistencia lingüística. Llame al [phone].    We comply with applicable federal civil rights laws and [place] laws. We do not discriminate on the basis of race, color, national origin, age, disability, sex, sexual orientation, or gender identity.            Thank you!     Thank you for choosing [facility] [place]  for your care. Our goal is always to provide you with excellent care. Hearing back from our patients is one way we can continue to improve our services. Please take a few minutes to complete the written survey that you may receive in the mail after your visit with us. Thank you!             Your Updated Medication List - Protect others around you: Learn how to safely use, store and  throw away your medicines at www.disposemymeds.org.          This list is accurate as of: 11/30/17  7:38 PM.  Always use your most recent med list.                   Brand Name Dispense Instructions for use Diagnosis    atorvastatin 80 MG tablet    LIPITOR    90 tablet    Take 1 tablet (80 mg) by mouth daily    Hyperlipidemia LDL goal <100       blood glucose lancets standard    no brand specified    100 each    Use to test blood sugar 2 times daily or as directed.    Type 2 diabetes mellitus without complication, without long-term current use of insulin (H)       blood glucose monitoring meter device kit    no brand specified    1 kit    Use to test blood sugar 2 times daily or as directed.    Type 2 diabetes mellitus without complication, without long-term current use of insulin (H)       blood glucose monitoring test strip    no brand specified    100 strip    Use to test blood sugars 2 times daily or as directed    Type 2 diabetes mellitus without complication, without long-term current use of insulin (H)       CALCIUM-MAGNESIUM-VITAMIN D PO      Take 2 tablets by mouth daily        EPINEPHrine 0.3 MG/0.3ML injection 2-pack    EPIPEN/ADRENACLICK/or ANY BX GENERIC EQUIV    1 each    Inject 0.3 mLs (0.3 mg) into the muscle once as needed for anaphylaxis    Bee allergy status       escitalopram 20 MG tablet    LEXAPRO    30 tablet    Take 1 tablet (20 mg) by mouth daily    Major depressive disorder, recurrent episode, mild (H)       fluticasone 50 MCG/ACT spray    FLONASE    48 mL    SHAKE WELL AND USE 2 SPRAYS IN EACH NOSTRIL DAILY    Chronic maxillary sinusitis       gabapentin 100 MG capsule    NEURONTIN    42 capsule    Take 1-2 capsules (100-200 mg) by mouth 3 times daily Start the day after chemo for one week    Pain in joint, multiple sites       L-GLUTAMINE PO      Take 2 g by mouth 2 times daily        lisinopril 10 MG tablet    PRINIVIL/ZESTRIL    90 tablet    Take 1 tablet (10 mg) by mouth daily     "Essential hypertension       loratadine 10 MG tablet    CLARITIN    30 tablet    Take 1 tablet (10 mg) by mouth daily    Pain in joint, multiple sites       LORazepam 1 MG tablet    ATIVAN    30 tablet    Take 1 tablet (1 mg) by mouth every 6 hours as needed (nausea/vomiting, anxiety or sleep)    Ovarian cancer, left (H), Encounter for long-term (current) use of medications       metFORMIN 500 MG 24 hr tablet    GLUCOPHAGE-XR    180 tablet    Take two tabs by mouth once daily with evening meal.    Type 2 diabetes mellitus without complication, without long-term current use of insulin (H)       MULTIVITAMIN ADULT PO      Take 1 tablet by mouth daily        order for DME     1 Units    Equipment being ordered: blood pressure monitor    Essential hypertension       * order for DME     1 each    Compression stockings 20-30 mm Hg. To be wear when directed by Hematology team and while awake for the first two years post clot.    Long-term (current) use of anticoagulants, Acute deep vein thrombosis (DVT) of left lower extremity, unspecified vein (H)       * order for DME     1 Bottle    Plain packing strip 1/4\"    Abscess of leg       * order for DME     1 Box    Sterile zoey tipped applicators    Abscess of leg       * order for DME     3 Bottle    USE 1/4 INCH  WIDTH PLAIN NU GAUZE PACKING TO DO SELF WOUND CARE OF LEFT LOWER LEG ONCE DAILY. DIMENSIONS OF WOUND PRESENTLY ARE 2 X 2 INCH AND APPROXIMATELY 1/2 INCH DEEP.  USES 6 INCHES OF PACKING CURRENTLY FOR DRESSING CHANGE.  FAX TO [facility] -[phone]    Wound of left leg       * order for DME     30 pad    STERILE ADHESIVE PAD BANDAGE AT LEAST 4X4 INCH IN SIZE NEEDED FOR DAILY WOUND CARE TO LEFT LOWER LEG. FAX TO [facility] -[phone]    Leg wound, left       * order for DME     1 Units    Home blood pressure monitor    Type 2 diabetes mellitus without complication, without long-term current use of insulin (H)       PRO-BIOTIC BLEND PO      Take 1 " capsule by mouth daily Enzymatic Therapy-probiotic pearls        prochlorperazine 10 MG tablet    COMPAZINE    30 tablet    Take 1 tablet (10 mg) by mouth every 6 hours as needed (nausea/vomiting)    Ovarian cancer, left (H), Encounter for long-term (current) use of medications       rivaroxaban ANTICOAGULANT 20 MG Tabs tablet    XARELTO    30 tablet    Take 1 tablet (20 mg) by mouth daily (with dinner)    Long-term (current) use of anticoagulants, Acute deep vein thrombosis (DVT) of left lower extremity, unspecified vein (H)       traZODone 50 MG tablet    DESYREL    180 tablet    TAKE 1 TO 2 TABLETS(50  MG) BY MOUTH EVERY NIGHT AS NEEDED FOR SLEEP    Insomnia, unspecified type       VITAMIN B-COMPLEX PO           vitamin D 1000 UNITS capsule      Take 2,000 Units by mouth daily        VYVANSE PO      Take 50 mg by mouth daily        * Notice:  This list has 6 medication(s) that are the same as other medications prescribed for you. Read the directions carefully, and ask your doctor or other care provider to review them with you.

## 2017-12-01 ENCOUNTER — ONCOLOGY VISIT (OUTPATIENT)
Dept: ONCOLOGY | Facility: CLINIC | Age: 62
End: 2017-12-01
Attending: UROLOGY
Payer: COMMERCIAL

## 2017-12-01 VITALS
WEIGHT: 189 LBS | BODY MASS INDEX: 33.49 KG/M2 | HEIGHT: 63 IN | SYSTOLIC BLOOD PRESSURE: 116 MMHG | DIASTOLIC BLOOD PRESSURE: 73 MMHG | RESPIRATION RATE: 16 BRPM | TEMPERATURE: 97.9 F | HEART RATE: 78 BPM | OXYGEN SATURATION: 96 %

## 2017-12-01 DIAGNOSIS — N28.89 RENAL MASS, RIGHT: Primary | ICD-10-CM

## 2017-12-01 PROCEDURE — 99211 OFF/OP EST MAY X REQ PHY/QHP: CPT

## 2017-12-01 PROCEDURE — 99205 OFFICE O/P NEW HI 60 MIN: CPT | Performed by: UROLOGY

## 2017-12-01 ASSESSMENT — PAIN SCALES - GENERAL: PAINLEVEL: NO PAIN (0)

## 2017-12-01 NOTE — LETTER
12/1/2017         RE: Hafsa Corona  [address]  [place] [address]        Dear Colleague,    Thank you for referring your patient, Hafsa Corona, to the [facility]. Please see a copy of my visit note below.          Chief Complaint:   Renal Mass           Consult or Referral:     Mr. Hafsa Corona is a 62 year old female seen in consultation from Dr. Shaffer         History of Present Illness:    Hafsa Corona is a very pleasant 62 year old female who presents with a history of a Renal Mass.  This was discovered incidentally in follow up for her ovarian cancer  Was actually visible on initial scan 7/2017, but now noticed on retroscpect .  Initially diagnosed about 1 month(s) ago.  The mass is solid and enhancing.  The maximal dimension of the renal tumor is 3.0 centimeters.  The mass is located in the Right side and is primarily located in the inter polar region and lower pole.  The tumor is also 50 % endophytic and exophytic.  The tumor primarily lies on the posterior and medial surface.  With regard to the collecting system, the edge of the tumor arrives either abuts the collecting system or arrives within 4 mm of the collecting system.      Concerning  her renal function, her last SCr is   Lab Results   Component Value Date    CR 0.71 11/14/2017     she has the following risk factors for development of chronic kidney disease , renal mass.    ECOG Performance Score: 0  0=Fully active, 1=Unable to do strenuous activity but capable of light work, 2=Ambulatory and capable of all selfcare, 3=Capable of limited selfcare, confined to bed >50% of waking hours, 4=Completely disabled.    Has an artistic sense and dreamed about two beasts crossing her path earlier this year.         Past Medical History:     Past Medical History:   Diagnosis Date     Anxiety state, unspecified     [id number]     Attention deficit disorder without mention of hyperactivity      Chronic rhinitis       Depressive disorder, not elsewhere classified      Panic disorder without agoraphobia      Pure hypercholesterolemia     Lipitor     Tachycardia, unspecified     9/1999-2/2004     Type II or unspecified type diabetes mellitus without mention of complication, not stated as uncontrolled     diagnosed 2004            Past Surgical History:     Past Surgical History:   Procedure Laterality Date     HYSTERECTOMY TOTAL ABDOMINAL, BILATERAL SALPINGO-OOPHORECTOMY, NODE DISSECTION, COMBINED Left 7/7/2017    Procedure: COMBINED HYSTERECTOMY TOTAL ABDOMINAL, SALPINGO-OOPHORECTOMY, NODE DISSECTION;  Exploratory Laparotomy, Left Salpingo-Oophorectomy, Cancer Staging, Total Hysterectomy, Omentectomy, Evacuation of abdominal fluid, Lymph Node Dissection  Anesthesia Block ;  Surgeon: Serena Cole MD;  Location: [facility] OR     HYSTERECTOMY, PAP NO LONGER INDICATED  07/07/2017    Laparotomy; for ovarian cancer staging     INSERT PORT VASCULAR ACCESS Right 8/21/2017    Procedure: INSERT PORT VASCULAR ACCESS;  Single Lumen Chest Power Port;  Surgeon: Stephen Mike PA-C;  [facility]     LYMPHADENECTOMY RETROPERITONEAL Bilateral 07/07/2017    Laparotomy; pelvics & para-aortics; for ovarian cancer staging     OMENTECTOMY  07/07/2017    Laparotomy; for ovarian cancer staging     SALPINGO OOPHORECTOMY,R/L/KAYY Right 2008    Salpingo Oophorectomy, RT     SALPINGO OOPHORECTOMY,R/L/KAYY Left 07/07/2017    Laparotomy; for ovarian cancer staging     SURGICAL HISTORY OF -       facial surgery d/t fall in 1/2002     SURGICAL HISTORY OF -       1985 removal of breast cyst     SURGICAL HISTORY OF -   2008    endometrial ablation            Medications     Current Outpatient Prescriptions   Medication     B Complex Vitamins (VITAMIN B-COMPLEX PO)     blood glucose (NO BRAND SPECIFIED) lancets standard     blood glucose monitoring (NO BRAND SPECIFIED) meter device kit     blood glucose monitoring (NO BRAND SPECIFIED) test strip      order for DME     L-GLUTAMINE PO     gabapentin (NEURONTIN) 100 MG capsule     fluticasone (FLONASE) 50 MCG/ACT spray     LORazepam (ATIVAN) 1 MG tablet     loratadine (CLARITIN) 10 MG tablet     order for DME     order for DME     prochlorperazine (COMPAZINE) 10 MG tablet     rivaroxaban ANTICOAGULANT (XARELTO) 20 MG TABS tablet     traZODone (DESYREL) 50 MG tablet     order for DME     order for DME     Multiple Vitamins-Minerals (MULTIVITAMIN ADULT PO)     Lisdexamfetamine Dimesylate (VYVANSE PO)     order for DME     CALCIUM-MAGNESIUM-VITAMIN D PO     Probiotic Product (PRO-BIOTIC BLEND PO)     atorvastatin (LIPITOR) 80 MG tablet     order for DME     lisinopril (PRINIVIL/ZESTRIL) 10 MG tablet     metFORMIN (GLUCOPHAGE-XR) 500 MG 24 hr tablet     escitalopram (LEXAPRO) 20 MG tablet     EPINEPHrine (EPIPEN) 0.3 MG/0.3ML injection     Cholecalciferol (VITAMIN D) 1000 UNIT capsule     No current facility-administered medications for this visit.             Family History:     Family History   Problem Relation Age of Onset     C.A.D. Father      DIABETES Father      Hypertension Father      CEREBROVASCULAR DISEASE Father      Psychotic Disorder Father      Pancreatic Cancer Father      C.A.D. Mother      Hypertension Mother      Breast Cancer Mother      Psychotic Disorder Mother      Colon Cancer Mother      CANCER Mother      Bone cancer     Prostate Problems Brother      cleared      Psychotic Disorder Sister      x2     Neurologic Disorder Sister      Psychotic Disorder Brother      x2     Depression Brother      major depressive disorder and OCD     Anxiety Disorder Brother      MENTAL ILLNESS Brother      Psychotic Disorder Maternal Grandmother      ?     Psychotic Disorder Maternal Grandfather      ?     Psychotic Disorder Paternal Grandmother      Schizophernia     Psychotic Disorder Paternal Grandfather      ?     Respiratory Paternal Grandfather       of emphysema and smoking     Psychotic Disorder  "Sister      Other - See Comments Sister      small kidney stone      Cancer - colorectal No family hx of             Social History:     Social History     Social History     Marital status: Single     Spouse name: N/A     Number of children: N/A     Years of education: N/A     Occupational History     Not on file.     Social History Main Topics     Smoking status: Never Smoker     Smokeless tobacco: Never Used     Alcohol use Yes      Comment: rarely     Drug use: No     Sexual activity: Yes     Birth control/ protection: None     Other Topics Concern     Not on file     Social History Narrative            Allergies:   Wasps [hornets]; No clinical screening - see comments; Paclitaxel; and Sulfa drugs         Review of Systems:  From intake questionnaire     Negative 14 system review except as noted on HPI, nurse's note.         Physical Exam:     Patient is a 62 year old  female   Vitals: Blood pressure 116/73, pulse 78, temperature 97.9  F (36.6  C), temperature source Tympanic, resp. rate 16, height 1.6 m (5' 2.99\"), weight 85.7 kg (189 lb), last menstrual period 01/01/2009, SpO2 96 %, not currently breastfeeding.  General Appearance Adult: Alert, no acute distress, oriented  HENT: throat/mouth:normal, good dentition  Lungs: no respiratory distress, or pursed lip breathing  Heart: No obvious jugular venous distension present  Abdomen: soft, nontender, no organomegaly or masses, Body mass index is 33.49 kg/(m^2)., scars noted Midline infraumbilical/periumbilical  Musculoskeltal: extremities normal, no peripheral edema  Skin: no suspicious lesions or rashes  Neuro: Alert, oriented, speech and mentation normal  Psych: affect and mood normal  Gait: Normal  : deferred      Labs and Pathology:    I reviewed all applicable laboratory and pathology data reviewed with patient  Significant for     Lab Results   Component Value Date    CR 0.71 11/14/2017    CR 0.67 10/16/2017    CR 0.71 09/25/2017    CR 0.72 08/25/2017    " CR 0.78 08/16/2017    CR 1.00 07/11/2017    CR 1.17 07/10/2017    CR 1.08 07/08/2017    CR 1.14 07/07/2017    CR 0.88 07/03/2017           Imaging:    I reviewed all applicable imaging and went over the results with the patient.  Significant for Renal mass noted in July and again in Nov.  Slight growth      Outside and Past Medical records:    I spent 10 minutes reviewing outside and past medical records.           Assessment and Plan:     Assessment:  Enhancing Mass Concerning for Renal Cell Cancer    Plan:  We had an extensive discussion about the significance of a localized, enhancing kidney mass.  Evaluation of the literature demonstrates that approximately 80% of enhancing renal masses turn out to be kidney cancer upon removal, while 20% are found to be benign.  Although certain features such as size, gender and tumor characteristics can change these risks.    We discussed the role of renal mass biopsy including the fact that renal mass biopsy is very safe with current techniques (risk of tumor seeding far below 1%).  Though renal mass biopsy is usually quite accurate 90-95% of the time, it can be inaccurate and it can be non diagnostic.  Furthermore, the majority of patients find they just need to proceed to surgery anyway.     We discussed the options for treatment of a localized kidney mass including active surveillance, thermal ablation, and surgical extirpation.  Surgical extirpation has the best track record and close to a 99% chance of cure for T1a lesions compared to 90% cure with thermal ablation and is generally preferred.  Furthermore, thermal ablation is not optimal for larger masses or centrally located masses.  Active surveillance is also a reasonable option when life expectancy is less than 5-7 years.    This tumor is in a challenging location and abuts the renal pelvis.  If it involved the renal pelvis, we may be unable to reconstruct the collecting system.  Most of the time there is a plane and  I would estimate about 90% chance of partial nephrectomy success.    Furthermore, we discussed the relative merits of partial nephrectomy vs. radical nephrectomy and the theoretic basis behind maximal nephron preservation.  There is some data suggesting there are long term survival advantages and equivalent cancer control with nephron sparing surgery.  We also discussed the advantages and disadvantages and roles of open surgery vs. laparoscopic (and Da Sherry assisted) surgery.    The anticipated post-operative course was explained, including an anticipated 1 night hospital stay.    Patient has decided she would like to proceed with Robotic Partial Nephrectomy - 08711.  She has an opinion with Dr. Dick, a good friend of mine next week.  She would like to schedule surgery but will get the second opinion which I agree with.    The risks, benefits, alternatives, and personnel involved in the procudure were discussed.  All questions were answered.  A written informed consent will be finalized on the morning of the procedure.      Orders  Orders Placed This Encounter   Procedures     [place]-Operative Worksheet  (Urology General)     ABO/Rh Type and Screen       Needs h&p prior to surgery.      Javire Gregorio MD  Department of Urology Staff  [facility]  Patient Care Team:  Morelia Ayon NP as PCP - General  Karla Oswald, RN as Continuity Care Coordinator (Gyn-Onc)      Copy to patient  MANA BRISENO  [address] [place] [address]      Again, thank you for allowing me to participate in the care of your patient.        Sincerely,        Javier Gregorio MD

## 2017-12-01 NOTE — PATIENT INSTRUCTIONS
1.  Partial Robotic Nephrectomy in a couple of weeks at the [facility].    2.   Surgical teach-- complete. JOE Finley    3.  Pre op with PCP prior to surgery    No need to schedule appointment for patient. Carolyne WATSON

## 2017-12-01 NOTE — PROGRESS NOTES
"Psychology Progress Note  Counseling (#5).  INDIV TH 50 min.  INTERV pr solving, support.  DX: F43.23   MS Distress  S \"he's a bully. . It's about money. . Took this time to tell me I don't handle money well. . \"  O Distressed by family (visited [place] for T'giving).  A Possibility of an additional cancer is of great concern but her family seems to be more stressful, especially her brother.  She will be getting a consultation from both [facility] and [facility] this week regarding the possibility of kidney cancer.  Discussed together that family members can sometimes NOT be supportive regarding cancer/treatment but in my experience this does NOT mean they do not care.  It represents a complicated response sometimes by family and is hurtful, which is what she is experiencing.  GOAL Seek information.  Plan.  RT 2-3 wks.  TP  9.12.2017    "

## 2017-12-01 NOTE — NURSING NOTE
"Oncology Rooming Note    December 1, 2017 7:41 AM   Hafsa Corona is a 62 year old female who presents for:    Chief Complaint   Patient presents with     Oncology Clinic Visit     New Patient consult     Initial Vitals: /73  Pulse 78  Temp 97.9  F (36.6  C) (Tympanic)  Resp 16  Ht 1.6 m (5' 2.99\")  Wt 85.7 kg (189 lb)  LMP 01/01/2009  SpO2 96%  BMI 33.49 kg/m2 Estimated body mass index is 33.49 kg/(m^2) as calculated from the following:    Height as of this encounter: 1.6 m (5' 2.99\").    Weight as of this encounter: 85.7 kg (189 lb). Body surface area is 1.95 meters squared.  No Pain (0) Comment: Data Unavailable   Patient's last menstrual period was 01/01/2009.  Allergies reviewed: Yes  Medications reviewed: Yes    Medications: Medication refills not needed today.  Pharmacy name entered into [facility]:    [id number] - [place], [place] - [address] AT [facility] [facility] - [place], [place] - [address] [facility] [place], [place] - [address] [facility] [phone]    Clinical concerns: New Patient consult     8 minutes for nursing intake (face to face time)     Jazzy Miller CMA     DISCHARGE PLAN:  Next appointments: See patient instruction section  Departure Mode: Ambulatory  Accompanied by: self  0 minutes for nursing discharge (face to face time)   Jazzy Miller CMA                    "

## 2017-12-01 NOTE — MR AVS SNAPSHOT
After Visit Summary   12/1/2017    Hafsa Corona    MRN: [id number]           Patient Information     Date Of Birth          1955        Visit Information        Provider Department      12/1/2017 7:30 AM Javier Gregorio MD [facility]         Follow-ups after your visit        Your next 10 appointments already scheduled     Dec 01, 2017  7:30 AM CST   New Visit with Javier Gregorio MD   [facility] ([facility])    [facility]  [address]  [facility] [address]   [phone]            Dec 07, 2017 12:00 PM CST   (Arrive by 11:45 AM)   RETURN WITH ROOM with Lola Vasquez, PhD LP,  2 118 [facility]SULT [facility])    [address] [place] [address]   [phone]            Dec 08, 2017  1:15 PM CST   (Arrive by 1:00 PM)   RETURN WITH ROOM with Mariam Sams GC,  2 116 CONSULT [facility] ([facility])    [address] [place] [address]   [phone]            Dec 14, 2017 12:00 PM CST   (Arrive by 11:45 AM)   RETURN WITH ROOM with Lola Vasquez, PhD ,  2 118 CONSULT [facility])    [address] [place] [address] [phone]              Who to contact     If you have questions or need follow up information about today's clinic visit or your schedule please contact [facility] directly at [phone].  Normal or non-critical lab and imaging results will be communicated to you by MyChart, letter or phone within 4 business days after the clinic has received the results. If you do not hear from us within 7 days, please contact the clinic through MyChart or phone. If you have a critical or abnormal lab result,  "we will notify you by phone as soon as possible.  Submit refill requests through [employer] or call your pharmacy and they will forward the refill request to us. Please allow 3 business days for your refill to be completed.          Additional Information About Your Visit        [employer] Information     Expert[employer] lets you send messages to your doctor, view your test results, renew your prescriptions, schedule appointments and more. To sign up, go to [email or web address] . Click on \"Log in\" on the left side of the screen, which will take you to the Welcome page. Then click on \"Sign up Now\" on the right side of the page.     You will be asked to enter the access code listed below, as well as some personal information. Please follow the directions to create your username and password.     Your access code is: [email or web address]  Expires: 2018  8:38 AM     Your access code will  in 90 days. If you need help or a new code, please call your [place] clinic or [phone].        Care EveryWhere ID     This is your Care EveryWhere ID. This could be used by other organizations to access your [place] medical records  [id number]        Your Vitals Were     Last Period                   2009            Blood Pressure from Last 3 Encounters:   17 134/87   17 110/74   10/19/17 118/75    Weight from Last 3 Encounters:   17 85.8 kg (189 lb 3.2 oz)   17 81.4 kg (179 lb 6 oz)   10/19/17 84.2 kg (185 lb 9.6 oz)              Today, you had the following     No orders found for display         Today's Medication Changes          These changes are accurate as of: 17  2:29 PM.  If you have any questions, ask your nurse or doctor.               These medicines have changed or have updated prescriptions.        Dose/Directions    metFORMIN 500 MG 24 hr tablet   Commonly known as:  GLUCOPHAGE-XR   This may have changed:    - how much to take  - when to take this  - additional instructions   Used " for:  Type 2 diabetes mellitus without complication, without long-term current use of insulin (H)        Take two tabs by mouth once daily with evening meal.   Quantity:  180 tablet   Refills:  PRN                Primary Care Provider Office Phone # Fax #    Morelia JAVIER Ayon [phone] [address] [place]  [facility] [address]        Equal Access to Services     [facility]: Hadii aad ku hadasho Soomaali, waaxda luqadaha, qaybta kaalmada adeegyada, waxay idiin hayaan adeeg kharash la'aan . So [facility] [phone].    ATENCIÓN: Si habla español, tiene a martínez disposición servicios gratuitos de asistencia lingüística. Llame al [phone].    We comply with applicable federal civil rights laws and [place] laws. We do not discriminate on the basis of race, color, national origin, age, disability, sex, sexual orientation, or gender identity.            Thank you!     Thank you for choosing [facility]  for your care. Our goal is always to provide you with excellent care. Hearing back from our patients is one way we can continue to improve our services. Please take a few minutes to complete the written survey that you may receive in the mail after your visit with us. Thank you!             Your Updated Medication List - Protect others around you: Learn how to safely use, store and throw away your medicines at www.disposemymeds.org.          This list is accurate as of: 11/22/17  2:29 PM.  Always use your most recent med list.                   Brand Name Dispense Instructions for use Diagnosis    atorvastatin 80 MG tablet    LIPITOR    90 tablet    Take 1 tablet (80 mg) by mouth daily    Hyperlipidemia LDL goal <100       blood glucose lancets standard    no brand specified    100 each    Use to test blood sugar 2 times daily or as directed.    Type 2 diabetes mellitus without complication, without long-term current use of insulin (H)       blood glucose monitoring meter  device kit    no brand specified    1 kit    Use to test blood sugar 2 times daily or as directed.    Type 2 diabetes mellitus without complication, without long-term current use of insulin (H)       blood glucose monitoring test strip    no brand specified    100 strip    Use to test blood sugars 2 times daily or as directed    Type 2 diabetes mellitus without complication, without long-term current use of insulin (H)       CALCIUM-MAGNESIUM-VITAMIN D PO      Take 2 tablets by mouth daily        EPINEPHrine 0.3 MG/0.3ML injection 2-pack    EPIPEN/ADRENACLICK/or ANY BX GENERIC EQUIV    1 each    Inject 0.3 mLs (0.3 mg) into the muscle once as needed for anaphylaxis    Bee allergy status       escitalopram 20 MG tablet    LEXAPRO    30 tablet    Take 1 tablet (20 mg) by mouth daily    Major depressive disorder, recurrent episode, mild (H)       fluticasone 50 MCG/ACT spray    FLONASE    48 mL    SHAKE WELL AND USE 2 SPRAYS IN EACH NOSTRIL DAILY    Chronic maxillary sinusitis       gabapentin 100 MG capsule    NEURONTIN    42 capsule    Take 1-2 capsules (100-200 mg) by mouth 3 times daily Start the day after chemo for one week    Pain in joint, multiple sites       L-GLUTAMINE PO      Take 2 g by mouth 2 times daily        lisinopril 10 MG tablet    PRINIVIL/ZESTRIL    90 tablet    Take 1 tablet (10 mg) by mouth daily    Essential hypertension       loratadine 10 MG tablet    CLARITIN    30 tablet    Take 1 tablet (10 mg) by mouth daily    Pain in joint, multiple sites       LORazepam 1 MG tablet    ATIVAN    30 tablet    Take 1 tablet (1 mg) by mouth every 6 hours as needed (nausea/vomiting, anxiety or sleep)    Ovarian cancer, left (H), Encounter for long-term (current) use of medications       metFORMIN 500 MG 24 hr tablet    GLUCOPHAGE-XR    180 tablet    Take two tabs by mouth once daily with evening meal.    Type 2 diabetes mellitus without complication, without long-term current use of insulin (H)        "MULTIVITAMIN ADULT PO      Take 1 tablet by mouth daily        order for DME     1 Units    Equipment being ordered: blood pressure monitor    Essential hypertension       * order for DME     1 each    Compression stockings 20-30 mm Hg. To be wear when directed by Hematology team and while awake for the first two years post clot.    Long-term (current) use of anticoagulants, Acute deep vein thrombosis (DVT) of left lower extremity, unspecified vein (H)       * order for DME     1 Bottle    Plain packing strip 1/4\"    Abscess of leg       * order for DME     1 Box    Sterile zoey tipped applicators    Abscess of leg       * order for DME     3 Bottle    USE 1/4 INCH  WIDTH PLAIN NU GAUZE PACKING TO DO SELF WOUND CARE OF LEFT LOWER LEG ONCE DAILY. DIMENSIONS OF WOUND PRESENTLY ARE 2 X 2 INCH AND APPROXIMATELY 1/2 INCH DEEP.  USES 6 INCHES OF PACKING CURRENTLY FOR DRESSING CHANGE.  FAX TO [employer] [phone]    Wound of left leg       * order for DME     30 pad    STERILE ADHESIVE PAD BANDAGE AT LEAST 4X4 INCH IN SIZE NEEDED FOR DAILY WOUND CARE TO LEFT LOWER LEG. FAX TO [employer] [phone]    Leg wound, left       * order for DME     1 Units    Home blood pressure monitor    Type 2 diabetes mellitus without complication, without long-term current use of insulin (H)       PRO-BIOTIC BLEND PO      Take 1 capsule by mouth daily Enzymatic Therapy-probiotic pearls        prochlorperazine 10 MG tablet    COMPAZINE    30 tablet    Take 1 tablet (10 mg) by mouth every 6 hours as needed (nausea/vomiting)    Ovarian cancer, left (H), Encounter for long-term (current) use of medications       rivaroxaban ANTICOAGULANT 20 MG Tabs tablet    XARELTO    30 tablet    Take 1 tablet (20 mg) by mouth daily (with dinner)    Long-term (current) use of anticoagulants, Acute deep vein thrombosis (DVT) of left lower extremity, unspecified vein (H)       traZODone 50 MG tablet    DESYREL    180 tablet    TAKE 1 TO 2 " TABLETS(50  MG) BY MOUTH EVERY NIGHT AS NEEDED FOR SLEEP    Insomnia, unspecified type       VITAMIN B-COMPLEX PO           vitamin D 1000 UNITS capsule      Take 2,000 Units by mouth daily        VYVANSE PO      Take 50 mg by mouth daily        * Notice:  This list has 6 medication(s) that are the same as other medications prescribed for you. Read the directions carefully, and ask your doctor or other care provider to review them with you.

## 2017-12-06 NOTE — NURSING NOTE
Late entry for 12/1/17-- Surgical teaching complete for Robotic Partial Nephrectomy. See education section for further details.     JOE Finley

## 2017-12-07 ENCOUNTER — TELEPHONE (OUTPATIENT)
Dept: ONCOLOGY | Facility: CLINIC | Age: 62
End: 2017-12-07

## 2017-12-07 ENCOUNTER — OFFICE VISIT (OUTPATIENT)
Dept: PSYCHOLOGY | Facility: CLINIC | Age: 62
End: 2017-12-07
Attending: PSYCHOLOGIST
Payer: COMMERCIAL

## 2017-12-07 DIAGNOSIS — F43.20 ADJUSTMENT DISORDER, UNSPECIFIED TYPE: Primary | ICD-10-CM

## 2017-12-07 PROCEDURE — 40000114 ZZH STATISTIC NO CHARGE CLINIC VISIT

## 2017-12-07 PROCEDURE — 90834 PSYTX W PT 45 MINUTES: CPT | Mod: ZP | Performed by: PSYCHOLOGIST

## 2017-12-07 NOTE — TELEPHONE ENCOUNTER
Patient stopped into clinic to see RN. RN was unable to meet with patient due to having a provider in clinic. Patient left a note with a patient representative to give to RN.     The note states the following:  The mass on my kidney was (underlined) there already in July! Check the scan! Radiologist missed it. It has gotten a bit larger between July and November CT scans. I'm not yet decided on next steps either Dr. Gregorio or Dr. Dick at [place].      Will discuss with MD. Morelia Lehman RN

## 2017-12-07 NOTE — MR AVS SNAPSHOT
After Visit Summary   12/7/2017    Hafsa Corona    MRN: [id number]           Patient Information     Date Of Birth          1955        Visit Information        Provider Department      12/7/2017 12:00 PM Lola Vasquez, PhD LP; UC 2 118 CONSULT [facility]        Today's Diagnoses     Adjustment disorder, unspecified type    -  1       Follow-ups after your visit        Your next 10 appointments already scheduled     Dec 08, 2017  1:15 PM CST   (Arrive by 1:00 PM)   RETURN WITH ROOM with Mariam Sams GC, UC 2 116 CONSULT [facility] ([facility])    [address] [facility]  2nd [facility] [id number] [phone]            Dec 14, 2017 12:00 PM CST   (Arrive by 11:45 AM)   RETURN WITH ROOM with Lola Vasquez, PhD ALEXANDER, UC 2 118 CONSULT [facility] ([facility])    [address] [place] [address]  [address] [facility] [id number] [phone]            Dec 15, 2017   Procedure with Javier Gregorio MD   [facility] (--)    [address]  [facility] [address]   [phone]            Jan 04, 2018 12:00 PM CST   (Arrive by 11:45 AM)   RETURN WITH ROOM with Lola Vasquez PhD LP, UC 2 114 CONSULT [facility] ([facility])    [address] [id number] [phone]            Jamie 10, 2018  1:30 PM CST   Return Visit with Javier Gregorio MD   [facility] ([facility])    [facility]  [address]  [address]  [facility] [address]   [phone]            Jan 11, 2018 12:00 PM CST   (Arrive by 11:45 AM)   RETURN WITH ROOM with Lola Vasquez PhD LP, UC 2 114 CONSULT [facility] ([facility])    [address] [place] [address]    [phone]              Who to contact     If you have questions or need follow up information about today's clinic visit or your schedule please contact [facility] CANC[facility] CLINIC directly at [phone].  Normal or non-critical lab and imaging results will be communicated to you by MyChart, letter or phone within 4 business days after the clinic has received the results. If you do not hear from us within 7 days, please contact the clinic through Alives[employer] or phone. If you have a critical or abnormal lab result, we will notify you by phone as soon as possible.  Submit refill requests through [employer] or call your pharmacy and they will forward the refill request to us. Please allow 3 business days for your refill to be completed.          Additional Information About Your Visit        Aliveshoeshar[employer]ision Information     [employer] gives you secure access to your electronic health record. If you see a primary care provider, you can also send messages to your care team and make appointments. If you have questions, please call your primary care clinic.  If you do not have a primary care provider, please call [phone] and they will assist you.        Care EveryWhere ID     This is your Care EveryWhere ID. This could be used by other organizations to access your [place] medical records  [id number]        Your Vitals Were     Last Period                   01/01/2009            Blood Pressure from Last 3 Encounters:   12/01/17 116/73   11/20/17 134/87   11/01/17 110/74    Weight from Last 3 Encounters:   12/01/17 85.7 kg (189 lb)   11/20/17 85.8 kg (189 lb 3.2 oz)   11/01/17 81.4 kg (179 lb 6 oz)              Today, you had the following     No orders found for display         Today's Medication Changes          These changes are accurate as of: 12/7/17  6:46 PM.  If you have any questions, ask your nurse or doctor.               These medicines have changed or have updated prescriptions.        Dose/Directions    metFORMIN  500 MG 24 hr tablet   Commonly known as:  GLUCOPHAGE-XR   This may have changed:    - how much to take  - when to take this  - additional instructions   Used for:  Type 2 diabetes mellitus without complication, without long-term current use of insulin (H)        Take two tabs by mouth once daily with evening meal.   Quantity:  180 tablet   Refills:  PRN                Primary Care Provider Office Phone # Fax #    Morelia Ayon, [phone] [address] [facility] [address]        Equal Access to Services     RONEY ARANDA : Hadii aad ku hadasho Soomaali, waaxda luqadaha, qaybta kaalmada adeegyada, waxay idiin hayaan erika betts . So [facility] [phone].    ATENCIÓN: Si habla español, tiene a martínez disposición servicios gratuitos de asistencia lingüística. [facility] [phone].    We comply with applicable federal civil rights laws and [place] laws. We do not discriminate on the basis of race, color, national origin, age, disability, sex, sexual orientation, or gender identity.            Thank you!     Thank you for choosing [facility] CLINIC  for your care. Our goal is always to provide you with excellent care. Hearing back from our patients is one way we can continue to improve our services. Please take a few minutes to complete the written survey that you may receive in the mail after your visit with us. Thank you!             Your Updated Medication List - Protect others around you: Learn how to safely use, store and throw away your medicines at www.disposemymeds.org.          This list is accurate as of: 12/7/17  6:46 PM.  Always use your most recent med list.                   Brand Name Dispense Instructions for use Diagnosis    atorvastatin 80 MG tablet    LIPITOR    90 tablet    Take 1 tablet (80 mg) by mouth daily    Hyperlipidemia LDL goal <100       blood glucose lancets standard    no brand specified    100 each    Use to test blood sugar 2 times daily  or as directed.    Type 2 diabetes mellitus without complication, without long-term current use of insulin (H)       blood glucose monitoring meter device kit    no brand specified    1 kit    Use to test blood sugar 2 times daily or as directed.    Type 2 diabetes mellitus without complication, without long-term current use of insulin (H)       blood glucose monitoring test strip    no brand specified    100 strip    Use to test blood sugars 2 times daily or as directed    Type 2 diabetes mellitus without complication, without long-term current use of insulin (H)       CALCIUM-MAGNESIUM-VITAMIN D PO      Take 2 tablets by mouth daily        EPINEPHrine 0.3 MG/0.3ML injection 2-pack    EPIPEN/ADRENACLICK/or ANY BX GENERIC EQUIV    1 each    Inject 0.3 mLs (0.3 mg) into the muscle once as needed for anaphylaxis    Bee allergy status       escitalopram 20 MG tablet    LEXAPRO    30 tablet    Take 1 tablet (20 mg) by mouth daily    Major depressive disorder, recurrent episode, mild (H)       fluticasone 50 MCG/ACT spray    FLONASE    48 mL    SHAKE WELL AND USE 2 SPRAYS IN EACH NOSTRIL DAILY    Chronic maxillary sinusitis       gabapentin 100 MG capsule    NEURONTIN    42 capsule    Take 1-2 capsules (100-200 mg) by mouth 3 times daily Start the day after chemo for one week    Pain in joint, multiple sites       L-GLUTAMINE PO      Take 2 g by mouth 2 times daily        lisinopril 10 MG tablet    PRINIVIL/ZESTRIL    90 tablet    Take 1 tablet (10 mg) by mouth daily    Essential hypertension       loratadine 10 MG tablet    CLARITIN    30 tablet    Take 1 tablet (10 mg) by mouth daily    Pain in joint, multiple sites       LORazepam 1 MG tablet    ATIVAN    30 tablet    Take 1 tablet (1 mg) by mouth every 6 hours as needed (nausea/vomiting, anxiety or sleep)    Ovarian cancer, left (H), Encounter for long-term (current) use of medications       metFORMIN 500 MG 24 hr tablet    GLUCOPHAGE-XR    180 tablet    Take two  "tabs by mouth once daily with evening meal.    Type 2 diabetes mellitus without complication, without long-term current use of insulin (H)       MULTIVITAMIN ADULT PO      Take 1 tablet by mouth daily        order for DME     1 Units    Equipment being ordered: blood pressure monitor    Essential hypertension       * order for DME     1 each    Compression stockings 20-30 mm Hg. To be wear when directed by Hematology team and while awake for the first two years post clot.    Long-term (current) use of anticoagulants, Acute deep vein thrombosis (DVT) of left lower extremity, unspecified vein (H)       * order for DME     1 Bottle    Plain packing strip 1/4\"    Abscess of leg       * order for DME     1 Box    Sterile zoey tipped applicators    Abscess of leg       * order for DME     3 Bottle    USE 1/4 INCH  WIDTH PLAIN NU GAUZE PACKING TO DO SELF WOUND CARE OF LEFT LOWER LEG ONCE DAILY. DIMENSIONS OF WOUND PRESENTLY ARE 2 X 2 INCH AND APPROXIMATELY 1/2 INCH DEEP.  USES 6 INCHES OF PACKING CURRENTLY FOR DRESSING CHANGE.  FAX TO [employer] [phone]    Wound of left leg       * order for DME     30 pad    STERILE ADHESIVE PAD BANDAGE AT LEAST 4X4 INCH IN SIZE NEEDED FOR DAILY WOUND CARE TO LEFT LOWER LEG. FAX TO [employer] [phone]    Leg wound, left       * order for DME     1 Units    Home blood pressure monitor    Type 2 diabetes mellitus without complication, without long-term current use of insulin (H)       PRO-BIOTIC BLEND PO      Take 1 capsule by mouth daily Enzymatic Therapy-probiotic pearls        prochlorperazine 10 MG tablet    COMPAZINE    30 tablet    Take 1 tablet (10 mg) by mouth every 6 hours as needed (nausea/vomiting)    Ovarian cancer, left (H), Encounter for long-term (current) use of medications       rivaroxaban ANTICOAGULANT 20 MG Tabs tablet    XARELTO    30 tablet    Take 1 tablet (20 mg) by mouth daily (with dinner)    Long-term (current) use of anticoagulants, Acute " deep vein thrombosis (DVT) of left lower extremity, unspecified vein (H)       traZODone 50 MG tablet    DESYREL    180 tablet    TAKE 1 TO 2 TABLETS(50  MG) BY MOUTH EVERY NIGHT AS NEEDED FOR SLEEP    Insomnia, unspecified type       VITAMIN B-COMPLEX PO           vitamin D 1000 UNITS capsule      Take 2,000 Units by mouth daily        VYVANSE PO      Take 50 mg by mouth daily        * Notice:  This list has 6 medication(s) that are the same as other medications prescribed for you. Read the directions carefully, and ask your doctor or other care provider to review them with you.

## 2017-12-07 NOTE — PROGRESS NOTES
"Psychology Progress Note  Counseling (#5 ).  INDIV TH 50 min.  INTERV pr solving, support.  DX: F43.23  DC HIGH STRESS  S \"had twp consultations. . [facility] & here. . Some similarities.. Haven't made a decision\" \"saw _____ (old impt friend)\" \"and my family_____\"  O CT scan identified kidney mass of uncertain nature, possibly malignant. Further consultations have now  occurred.   Decision to proceed and at which clinical setting has not yet been made.  A Ms Corona is considering what additional information she may need to proceed with a decision about dx/dtreatment of the kidney mass.  She is collecting information in thoughtful, realistic way.  At the same time she continues to ruminate about issues with her family.  She has appreciated the support of her family. Waiting  For long term disability check.  GOAL Identify a decision about proceeding with medical care.  Plan.  RT 1 wk.  TP  9.12.2017    "

## 2017-12-08 ENCOUNTER — OFFICE VISIT (OUTPATIENT)
Dept: ONCOLOGY | Facility: CLINIC | Age: 62
End: 2017-12-08
Attending: GENETIC COUNSELOR, MS
Payer: COMMERCIAL

## 2017-12-08 DIAGNOSIS — C56.9 MALIGNANT NEOPLASM OF OVARY (H): Primary | ICD-10-CM

## 2017-12-08 DIAGNOSIS — N28.89 RENAL MASS, RIGHT: ICD-10-CM

## 2017-12-08 DIAGNOSIS — Z80.3 FAMILY HISTORY OF MALIGNANT NEOPLASM OF BREAST: ICD-10-CM

## 2017-12-08 DIAGNOSIS — Z80.0 FAMILY HISTORY OF PANCREATIC CANCER: ICD-10-CM

## 2017-12-08 PROCEDURE — 40000072 ZZH STATISTIC GENETIC COUNSELING, < 16 MIN: Mod: ZF | Performed by: GENETIC COUNSELOR, MS

## 2017-12-08 NOTE — PATIENT INSTRUCTIONS
Negative Genetic Test Result    Genetic Testing  You had a blood test that looked at the genetic information in one or more genes associated with increased cancer risk.  The testing looked for any harmful changes that would stop this particular gene from working like it should. If an individual does not have any harmful changes or variants of unknown significance found from their blood test, their genetic test result is reported as negative.       Results  The genetic test did not identify any pathogenic (harmful) changes in the genes that were tested. There are several possible explanations for a negative test result. Without knowing the gene mutation in your family, the cause of the cancer in you or your relatives is still unknown. Your genetic counselor can help interpret the result for you and your relatives. In this case, there are several reasons that may explain the negative test result:    There may be a gene mutation in the family that you did not inherit.     You may have a gene mutation in a different gene that was not included in the test, or has not yet been discovered.     The cancers in you or your family may be due to a combination of genetic factors and environment (multifactorial/familial).    The cancers in you or your family may be sporadic/random cancers.    There is very small chance that a mutation was not found by current testing methods.  As testing technology evolves over time, it may still be possible to identify a mutation in a gene that was not found on this test.    It is important to note which genes were included in your test. A list of these genes can be found on your test result.    Screening Recommendations  Due to this negative test result, cancer screening recommendations should be based on your personal and family history. This may include increased cancer screening for you and/or your family members. Your genetic counselor and health care provider can help make  appropriate recommendations.      Please call us if you have any questions or concerns.     Cancer Risk Management Program  [phone]-[facility] ([phone])  ? Francesca Spence, MS, [facility]  [phone]  ? Olesya Barraza, MS, [facility]  [phone]  ? Dang Valera, MS, [facility]  [phone]  ? Raven Concepcion, MS, [facility]  [phone]  ? Mariam Sams, MS, [facility]  [phone]

## 2017-12-08 NOTE — MR AVS SNAPSHOT
After Visit Summary   12/8/2017    Hafsa Corona    MRN: [id number]           Patient Information     Date Of Birth          1955        Visit Information        Provider Department      12/8/2017 1:15 PM Mariam Sams GC;  2 116 CONSULT [facility]        Care Instructions            Negative Genetic Test Result    Genetic Testing  You had a blood test that looked at the genetic information in one or more genes associated with increased cancer risk.  The testing looked for any harmful changes that would stop this particular gene from working like it should. If an individual does not have any harmful changes or variants of unknown significance found from their blood test, their genetic test result is reported as negative.       Results  The genetic test did not identify any pathogenic (harmful) changes in the genes that were tested. There are several possible explanations for a negative test result. Without knowing the gene mutation in your family, the cause of the cancer in you or your relatives is still unknown. Your genetic counselor can help interpret the result for you and your relatives. In this case, there are several reasons that may explain the negative test result:    There may be a gene mutation in the family that you did not inherit.     You may have a gene mutation in a different gene that was not included in the test, or has not yet been discovered.     The cancers in you or your family may be due to a combination of genetic factors and environment (multifactorial/familial).    The cancers in you or your family may be sporadic/random cancers.    There is very small chance that a mutation was not found by current testing methods.  As testing technology evolves over time, it may still be possible to identify a mutation in a gene that was not found on this test.    It is important to note which genes were included in your test. A list of these genes can be found on  your test result.    Screening Recommendations  Due to this negative test result, cancer screening recommendations should be based on your personal and family history. This may include increased cancer screening for you and/or your family members. Your genetic counselor and health care provider can help make appropriate recommendations.      Please call us if you have any questions or concerns.     Cancer Risk Management Program  [phone]-[facility]-CANCER ([phone])  ? Francesca Spence, MS, [facility]  [phone]  ? Olesya Christi, MS, [facility]  [phone]  ? Dang Soto, MS, [facility]  [phone]  ? Raven Juan Carlosbianka, MS, [facility]  [phone]  ? Mariam Sams, MS, [facility]  [phone]              Follow-ups after your visit        Your next 10 appointments already scheduled     Dec 08, 2017  1:15 PM CST   (Arrive by 1:00 PM)   RETURN WITH ROOM with Mariam Sams GC, UC 2 [address] CONSULT [facility])    [address] [place] [address]   [phone]            Dec 14, 2017 12:00 PM CST   (Arrive by 11:45 AM)   RETURN WITH ROOM with Lola Vasquez, PhD ,  2 118 CONSULT [facility])    [address] [place] [address]   [phone]            Dec 15, 2017   Procedure with Javier Gregorio MD   [facility] (--)    [address] [facility] [address]   [phone]            Jan 04, 2018 12:00 PM CST   (Arrive by 11:45 AM)   RETURN WITH ROOM with Lola Vasquez, PhD ,  2 [address] [facility]ULT [facility])    [address] [place] [address]   [phone]            Jamie 10, 2018  1:30 PM CST   Return Visit with Javier Gregorio MD   [facility] ([facility])    [facility]  [address] [place] [address] [address]  [facility] [address]   [phone]            Jan 11, 2018 12:00 PM CST   (Arrive by 11:45 AM)   RETURN WITH ROOM with Lola Vasquez, PhD ,  2 114 CONSULT [facility] ([facility])    [address]  2nd Floor  [facility] [address] [phone]              Who to contact     If you have questions or need follow up information about today's clinic visit or your schedule please contact [facility] directly at [phone].  Normal or non-critical lab and imaging results will be communicated to you by [facility], letter or phone within 4 business days after the clinic has received the results. If you do not hear from us within 7 days, please contact the clinic through [facility]ume[facility] or phone. If you have a critical or abnormal lab result, we will notify you by phone as soon as possible.  Submit refill requests through [facility] or call your pharmacy and they will forward the refill request to us. Please allow 3 business days for your refill to be completed.          Additional Information About Your Visit        [facility] Information     [facility] gives you secure access to your electronic health record. If you see a primary care provider, you can also send messages to your care team and make appointments. If you have questions, please call your primary care clinic.  If you do not have a primary care provider, please call [phone] and they will assist you.        Care EveryWhere ID     This is your Care EveryWhere ID. This could be used by other organizations to access your [facility] medical records  [id number]        Your Vitals Were     Last Period                   01/01/2009            Blood Pressure from Last 3 Encounters:   12/01/17 116/73   11/20/17 134/87   11/01/17 110/74    Weight from Last 3 Encounters:   12/01/17 85.7 kg (189 lb)   11/20/17 85.8 kg (189 lb 3.2 oz)   11/01/17 81.4 kg (179 lb 6 oz)              Today, you had  the following     No orders found for display         Today's Medication Changes          These changes are accurate as of: 12/8/17  1:05 PM.  If you have any questions, ask your nurse or doctor.               These medicines have changed or have updated prescriptions.        Dose/Directions    metFORMIN 500 MG 24 hr tablet   Commonly known as:  GLUCOPHAGE-XR   This may have changed:    - how much to take  - when to take this  - additional instructions   Used for:  Type 2 diabetes mellitus without complication, without long-term current use of insulin (H)        Take two tabs by mouth once daily with evening meal.   Quantity:  180 tablet   Refills:  PRN                Primary Care Provider Office Phone # Fax #    [place] HERRERA ATTILA Ayon [phone] [address] [facility] [address]        Equal Access to Services     RONEY ARANDA : Ward tellez Soaxel, waaxda luqadaha, qaybta kaalmada adeegyafrancesca, sujatha betts . So [facility] [phone].    ATENCIÓN: Si habla español, tiene a martínez disposición servicios gratuitos de asistencia lingüística. Llame al [phone].    We comply with applicable federal civil rights laws and [place] laws. We do not discriminate on the basis of race, color, national origin, age, disability, sex, sexual orientation, or gender identity.            Thank you!     Thank you for choosing [facility]  for your care. Our goal is always to provide you with excellent care. Hearing back from our patients is one way we can continue to improve our services. Please take a few minutes to complete the written survey that you may receive in the mail after your visit with us. Thank you!             Your Updated Medication List - Protect others around you: Learn how to safely use, store and throw away your medicines at www.disposemymeds.org.          This list is accurate as of: 12/8/17  1:05 PM.  Always use your most recent med list.                    Brand Name Dispense Instructions for use Diagnosis    atorvastatin 80 MG tablet    LIPITOR    90 tablet    Take 1 tablet (80 mg) by mouth daily    Hyperlipidemia LDL goal <100       blood glucose lancets standard    no brand specified    100 each    Use to test blood sugar 2 times daily or as directed.    Type 2 diabetes mellitus without complication, without long-term current use of insulin (H)       blood glucose monitoring meter device kit    no brand specified    1 kit    Use to test blood sugar 2 times daily or as directed.    Type 2 diabetes mellitus without complication, without long-term current use of insulin (H)       blood glucose monitoring test strip    no brand specified    100 strip    Use to test blood sugars 2 times daily or as directed    Type 2 diabetes mellitus without complication, without long-term current use of insulin (H)       CALCIUM-MAGNESIUM-VITAMIN D PO      Take 2 tablets by mouth daily        EPINEPHrine 0.3 MG/0.3ML injection 2-pack    EPIPEN/ADRENACLICK/or ANY BX GENERIC EQUIV    1 each    Inject 0.3 mLs (0.3 mg) into the muscle once as needed for anaphylaxis    Bee allergy status       escitalopram 20 MG tablet    LEXAPRO    30 tablet    Take 1 tablet (20 mg) by mouth daily    Major depressive disorder, recurrent episode, mild (H)       fluticasone 50 MCG/ACT spray    FLONASE    48 mL    SHAKE WELL AND USE 2 SPRAYS IN EACH NOSTRIL DAILY    Chronic maxillary sinusitis       gabapentin 100 MG capsule    NEURONTIN    42 capsule    Take 1-2 capsules (100-200 mg) by mouth 3 times daily Start the day after chemo for one week    Pain in joint, multiple sites       L-GLUTAMINE PO      Take 2 g by mouth 2 times daily        lisinopril 10 MG tablet    PRINIVIL/ZESTRIL    90 tablet    Take 1 tablet (10 mg) by mouth daily    Essential hypertension       loratadine 10 MG tablet    CLARITIN    30 tablet    Take 1 tablet (10 mg) by mouth daily    Pain in joint, multiple sites     "   LORazepam 1 MG tablet    ATIVAN    30 tablet    Take 1 tablet (1 mg) by mouth every 6 hours as needed (nausea/vomiting, anxiety or sleep)    Ovarian cancer, left (H), Encounter for long-term (current) use of medications       metFORMIN 500 MG 24 hr tablet    GLUCOPHAGE-XR    180 tablet    Take two tabs by mouth once daily with evening meal.    Type 2 diabetes mellitus without complication, without long-term current use of insulin (H)       MULTIVITAMIN ADULT PO      Take 1 tablet by mouth daily        order for DME     1 Units    Equipment being ordered: blood pressure monitor    Essential hypertension       * order for DME     1 each    Compression stockings 20-30 mm Hg. To be wear when directed by Hematology team and while awake for the first two years post clot.    Long-term (current) use of anticoagulants, Acute deep vein thrombosis (DVT) of left lower extremity, unspecified vein (H)       * order for DME     1 Bottle    Plain packing strip 1/4\"    Abscess of leg       * order for DME     1 Box    Sterile zoey tipped applicators    Abscess of leg       * order for DME     3 Bottle    USE 1/4 INCH  WIDTH PLAIN NU GAUZE PACKING TO DO SELF WOUND CARE OF LEFT LOWER LEG ONCE DAILY. DIMENSIONS OF WOUND PRESENTLY ARE 2 X 2 INCH AND APPROXIMATELY 1/2 INCH DEEP.  USES 6 INCHES OF PACKING CURRENTLY FOR DRESSING CHANGE.  FAX TO [facility] [phone]    Wound of left leg       * order for DME     30 pad    STERILE ADHESIVE PAD BANDAGE AT LEAST 4X4 INCH IN SIZE NEEDED FOR DAILY WOUND CARE TO LEFT LOWER LEG. FAX TO [employer] [phone]    Leg wound, left       * order for DME     1 Units    Home blood pressure monitor    Type 2 diabetes mellitus without complication, without long-term current use of insulin (H)       PRO-BIOTIC BLEND PO      Take 1 capsule by mouth daily Enzymatic Therapy-probiotic pearls        prochlorperazine 10 MG tablet    COMPAZINE    30 tablet    Take 1 tablet (10 mg) by mouth " every 6 hours as needed (nausea/vomiting)    Ovarian cancer, left (H), Encounter for long-term (current) use of medications       rivaroxaban ANTICOAGULANT 20 MG Tabs tablet    XARELTO    30 tablet    Take 1 tablet (20 mg) by mouth daily (with dinner)    Long-term (current) use of anticoagulants, Acute deep vein thrombosis (DVT) of left lower extremity, unspecified vein (H)       traZODone 50 MG tablet    DESYREL    180 tablet    TAKE 1 TO 2 TABLETS(50  MG) BY MOUTH EVERY NIGHT AS NEEDED FOR SLEEP    Insomnia, unspecified type       VITAMIN B-COMPLEX PO           vitamin D 1000 UNITS capsule      Take 2,000 Units by mouth daily        VYVANSE PO      Take 50 mg by mouth daily        * Notice:  This list has 6 medication(s) that are the same as other medications prescribed for you. Read the directions carefully, and ask your doctor or other care provider to review them with you.

## 2017-12-08 NOTE — PROGRESS NOTES
"12/8/2017    Referring Provider: Jarod Shaffer J., MD    Presenting Information:  Hafsa Corona returned to the Cancer Risk Management Program at the [facility] to discuss her genetic testing results. Her blood was drawn on 10/6/2017.  OvaNext genetic testing was ordered from [facility]. This testing was done because of her personal history of ovarian cancer and family history of multiple other cancers.     Genetic Testing Result: NEGATIVE  Hafsa is negative for mutations in the  COSTA, BARD1, BRCA1, BRCA2, BRIP1, CDH1, CHEK2, DICER1, EPCAM, MLH1, MRE11A, MSH2, MSH6, MUTYH, NBN, NF1, PALB2, PMS2, PTEN, RAD50, RAD51C, RAD51D, SMARCA4, STK11, and TP53  genes.    No mutations were found in any of the 25 genes analyzed.  This test involved sequencing and deletion/duplication analysis of all genes with the exception of EPCAM (deletions/duplications only).    Testing did not detect an identifiable mutation associated with  Hereditary Breast and Ovarian Cancer syndrome (BRCA1, BRCA2), Sullivan syndrome (MLH1, MSH2, MSH6, PMS2, EPCAM), Hereditary Diffuse Gastric Cancer (CDH1), Cowden syndrome (PTEN), Li Fraumeni syndrome (TP53), Peutz-Jeghers syndrome (STK11), MUTYH Associated Polyposis (MAP), or Neurofibromatosis type 1 (NF1).    A copy of the test report can be found in the Laboratory tab, dated 10/6/2017, and named \"[facility]". The report is scanned in as a linked document.    Interpretation:  We discussed several different interpretations of this negative test result.    1. One explanation may be that there is a different gene or combination of genes and environment that are associated with the cancers in Hafsa and/or her relatives.    2. It is possible that one of her parents did have a mutation in one of the 25 genes and Hafsa did not inherit it.  3. There is also a small possibility that there is a mutation in one of these genes, and we could not find it with our current testing methods.   "     Screening:  Based on this negative test result, it is important for Hafsa and her relatives to refer back to the family history for appropriate cancer screening.      Based on the SHERON Risk Evaluation Model v8 that takes into consideration Hafsa's personal medical history as well as family history, Hafsa's estimated lifetime risk of breast cancer is 23.5%. NCCN Breast Cancer Screening and Diagnosis guideline version 1.2017 recommends women with >20% lifetime risk to receive high risk screening.   - clinical encounter every 6-12 months   - annual screening mammogram   - annual breast MRI    We discussed that Hafsa could participate in our Cancer Risk Management Program in which our clinical nurse specialist provides an individual screening plan and assists with medical management. A referral was made to see MARLON Tracy, for this service.    Other population cancer screening, such as recommendations by the American Cancer Society, are also appropriate for Hafsa and her family.  These screening recommendations may change if there are changes to Hafsa's personal and/or family history.  Final screening recommendations should be made by each individual's managing physician.    Inheritance:  We reviewed the autosomal dominant inheritance of the OvaNext genes. Mutations in these genes do not skip generations.      Additional Testing Considerations:  In addition to the ovarian cancer, Hafsa was recently found to have a localized, enhancing renal mass that is concerning for renal cell carcinoma. She is planning on proceeding with robotic partial nephrectomy and is deciding where to receive her care.     We discussed in detail that it is possible Hafsa does carry a gene or combination of genes and environment that increase her risk for ovarian and/or renal cancer. We again reviewed the option of larger gene panels covering more moderate risk genes associated with these cancer. Hafsa expressed interest in  pursuing further genetic testing to obtain as much information as possible.    Genetic testing is available for 67 genes associated with increased risk for breast, colon, ovarian, pancreatic, renal, uterine, and many other cancers: CancerNext-Expanded (AIP, ALK, APC, COSTA, BAP1, BARD1, BLM, BRCA1, BRCA2, BRIP1, BMPR1A, CDH1, CDK4, CDKN1B, CDKN2A, CHEK2, DICER1, EPCAM, FANCC, FH, FLCN, GALNT12, GREM1, HOXB13, MAX, MEN1, MET, MITF, MLH1, MRE11A, MSH2, MSH6, MUTYH, NBN, NF1, NF2, PALB2, PHOX2B, PMS2, POLD1, POLE, POT1, PLRTM3A, PTCH1, PTEN, RAD50, RAD51C, RAD51D, RB1, RET, SDHA, SDHAF2, SDHB, SDHC, SDHD, SMAD4, SMARCA4, SMARCB1, SMARCE1, STK11, SUFU, CAIJ227, TP53, TSC1, TSC2, VHL, and XRCC2).      We discussed that many of the genes in the CancerNext-Expanded panel are associated with specific hereditary cancer syndromes and have published management guidelines:  Hereditary Breast and Ovarian Cancer syndrome (BRCA1, BRCA2), Sullivan syndrome (MLH1, MSH2, MSH6, PMS2, EPCAM), Familial Adenomatous Polyposis (APC), Hereditary Diffuse Gastric Cancer (CDH1), Familial Atypical Multiple Mole Melanoma syndrome (CDK4, CDKN2A), Juvenile Polyposis syndrome (BMPR1A, SMAD4), Cowden syndrome (PTEN), Li Fraumeni syndrome (TP53), Peutz-Jeghers syndrome (STK11), MUTYH Associated Polyposis (MUTYH),  Hereditary Leiomyomatosis and Renal Cell Cancer (FH), Lgyt-Xwie-Kqhr (FLCN), Hereditary Papillary Renal Carcinoma (MET), Hereditary paraganglioma and pheochromocytoma syndrome (SDHA, SDHAF2, SDHB, SDHC, SDHD), Multiple Endocrine Neoplasia type 2 (RET), Tuberous sclerosis complex (TSC1, TSC2), Von Hippel-Lindau disease (VHL), Neurofibromatosis type 1 (NF1), Neurofibromatosis type 2 (NF1), Multiple endocrine neoplasia type 1 (MEN1), Multiple endocrine neoplasia type 4 (CDKN1B), Gorlin syndrome (SUFU, PTCH1), and Pineda complex (AIP, [id number]).    The COSTA, BLM, BRIP1, CHEK2, GALNT12, GREM1, NBN , PALB2, POLD1, POLE, RAD51C, and RAD51D genes are  associated with increased cancer risk and have published management guidelines for certain cancers.      The remaining genes (ALK, BAP1, BARD1, DICER1, FANCC, HOXB13, MRE11A, MAX, MET, MITF, PHOX2B, POT1, RAD50, SMARCA4,SMARCB1, SMARCE1, NFUN662, and XRCC2) are associated with increased cancer risk and may allow us to make medical recommendations when mutations are identified.      [facility], the laboratory that performed the OvaNext genetic testing, will be performing the expanded panel as a reflex testing.     Although Hafsa's genetic testing result was negative, other relatives may still carry a gene mutation associated with cancers in their family. Genetic counseling is recommended for Hafsa's siblings to discuss genetic testing options.    Summary:  We do not have an explanation for Hafsa's ovarian cancer.  Because of that, it is important that she continue with cancer screening based on her personal and family history as discussed above.    Hafsa consented to proceed with the expanded cancer genetic test that contains 67 genes (including the 25 genes in the OvaNext panel).     Plan:  1. I provided Hafsa with a copy of her test results today, and a handout summarizing negative genetic test results (see after visit summary).  2. Hafsa consented to proceed with additional genetic testing (CancerNext - Expanded panel) through [facility].  3. Results will be available in 4-6 weeks and initially communicated over the telephone. Hafsa will return to clinic for detailed discussion afterwards.   4. Hafsa will follow up with our clinical nurse specialist, Stephie Karimi for high risk breast cancer screening.   5. Hafsa is encouraged to contact me if there are any changes to her personal or family history.    If Hafsa has any further questions, I encouraged her to contact me at [phone].    Time spent face to face: 15 minutes.    Mariam Sams MS, [facility]  Certified Genetic Counselor  T: [phone]  F:  [phone]

## 2017-12-08 NOTE — LETTER
"12/8/2017       RE: Hafsa Corona  [address]  [place] [address]     Dear Colleague,    Thank you for referring your patient, Hafsa Corona, to the [facility]. Please see a copy of my visit note below.    12/8/2017    Referring Provider: Jarod Shaffer J., MD    Presenting Information:  Hafsa Corona returned to the Cancer Risk Management Program at the [facility] to discuss her genetic testing results. Her blood was drawn on 10/6/2017.  OvaNext genetic testing was ordered from [facility]. This testing was done because of her personal history of ovarian cancer and family history of multiple other cancers.     Genetic Testing Result: NEGATIVE  Hafsa is negative for mutations in the  COSTA, BARD1, BRCA1, BRCA2, BRIP1, CDH1, CHEK2, DICER1, EPCAM, MLH1, MRE11A, MSH2, MSH6, MUTYH, NBN, NF1, PALB2, PMS2, PTEN, RAD50, RAD51C, RAD51D, SMARCA4, STK11, and TP53  genes.    No mutations were found in any of the 25 genes analyzed.  This test involved sequencing and deletion/duplication analysis of all genes with the exception of EPCAM (deletions/duplications only).    Testing did not detect an identifiable mutation associated with  Hereditary Breast and Ovarian Cancer syndrome (BRCA1, BRCA2), Sullivan syndrome (MLH1, MSH2, MSH6, PMS2, EPCAM), Hereditary Diffuse Gastric Cancer (CDH1), Cowden syndrome (PTEN), Li Fraumeni syndrome (TP53), Peutz-Jeghers syndrome (STK11), MUTYH Associated Polyposis (MAP), or Neurofibromatosis type 1 (NF1).    A copy of the test report can be found in the Laboratory tab, dated 10/6/2017, and named \"[facility]\". The report is scanned in as a linked document.    Interpretation:  We discussed several different interpretations of this negative test result.    1. One explanation may be that there is a different gene or combination of genes and environment that are associated with the cancers in Hafsa and/or her relatives.    2. It is possible that one " of her parents did have a mutation in one of the 25 genes and Hafsa did not inherit it.  3. There is also a small possibility that there is a mutation in one of these genes, and we could not find it with our current testing methods.       Screening:  Based on this negative test result, it is important for Hafsa and her relatives to refer back to the family history for appropriate cancer screening.      Based on the SHERON Risk Evaluation Model v8 that takes into consideration Hafsa's personal medical history as well as family history, Hafsa's estimated lifetime risk of breast cancer is 23.5%. NCCN Breast Cancer Screening and Diagnosis guideline version 1.2017 recommends women with >20% lifetime risk to receive high risk screening.   - clinical encounter every 6-12 months   - annual screening mammogram   - annual breast MRI    We discussed that Hafsa could participate in our Cancer Risk Management Program in which our clinical nurse specialist provides an individual screening plan and assists with medical management. A referral was made to see MARLON Tracy, for this service.    Other population cancer screening, such as recommendations by the American Cancer Society, are also appropriate for Hafsa and her family.  These screening recommendations may change if there are changes to Hafsa's personal and/or family history.  Final screening recommendations should be made by each individual's managing physician.    Inheritance:  We reviewed the autosomal dominant inheritance of the OvaNext genes. Mutations in these genes do not skip generations.      Additional Testing Considerations:  In addition to the ovarian cancer, Hafsa was recently found to have a localized, enhancing renal mass that is concerning for renal cell carcinoma. She is planning on proceeding with robotic partial nephrectomy and is deciding where to receive her care.     We discussed in detail that it is possible Hafsa does carry a gene or  combination of genes and environment that increase her risk for ovarian and/or renal cancer. We again reviewed the option of larger gene panels covering more moderate risk genes associated with these cancer. Hafsa expressed interest in pursuing further genetic testing to obtain as much information as possible.    Genetic testing is available for 67 genes associated with increased risk for breast, colon, ovarian, pancreatic, renal, uterine, and many other cancers: CancerNext-Expanded (AIP, ALK, APC, COSTA, BAP1, BARD1, BLM, BRCA1, BRCA2, BRIP1, BMPR1A, CDH1, CDK4, CDKN1B, CDKN2A, CHEK2, DICER1, EPCAM, FANCC, FH, FLCN, GALNT12, GREM1, HOXB13, MAX, MEN1, MET, MITF, MLH1, MRE11A, MSH2, MSH6, MUTYH, NBN, NF1, NF2, PALB2, PHOX2B, PMS2, POLD1, POLE, POT1, EIVCX8F, PTCH1, PTEN, RAD50, RAD51C, RAD51D, RB1, RET, SDHA, SDHAF2, SDHB, SDHC, SDHD, SMAD4, SMARCA4, SMARCB1, SMARCE1, STK11, SUFU, XYHT205, TP53, TSC1, TSC2, VHL, and XRCC2).      We discussed that many of the genes in the CancerNext-Expanded panel are associated with specific hereditary cancer syndromes and have published management guidelines:  Hereditary Breast and Ovarian Cancer syndrome (BRCA1, BRCA2), Sullivan syndrome (MLH1, MSH2, MSH6, PMS2, EPCAM), Familial Adenomatous Polyposis (APC), Hereditary Diffuse Gastric Cancer (CDH1), Familial Atypical Multiple Mole Melanoma syndrome (CDK4, CDKN2A), Juvenile Polyposis syndrome (BMPR1A, SMAD4), Cowden syndrome (PTEN), Li Fraumeni syndrome (TP53), Peutz-Jeghers syndrome (STK11), MUTYH Associated Polyposis (MUTYH),  Hereditary Leiomyomatosis and Renal Cell Cancer (FH), Cktv-Oogk-Alhv (FLCN), Hereditary Papillary Renal Carcinoma (MET), Hereditary paraganglioma and pheochromocytoma syndrome (SDHA, SDHAF2, SDHB, SDHC, SDHD), Multiple Endocrine Neoplasia type 2 (RET), Tuberous sclerosis complex (TSC1, TSC2), Von Hippel-Lindau disease (VHL), Neurofibromatosis type 1 (NF1), Neurofibromatosis type 2 (NF1), Multiple endocrine  neoplasia type 1 (MEN1), Multiple endocrine neoplasia type 4 (CDKN1B), Gorlin syndrome (SUFU, PTCH1), and Pineda complex (AIP, [id number]).    The COSTA, BLM, BRIP1, CHEK2, GALNT12, GREM1, NBN , PALB2, POLD1, POLE, RAD51C, and RAD51D genes are associated with increased cancer risk and have published management guidelines for certain cancers.      The remaining genes (ALK, BAP1, BARD1, DICER1, FANCC, HOXB13, MRE11A, MAX, MET, MITF, PHOX2B, POT1, RAD50, SMARCA4,SMARCB1, SMARCE1, [id number], and XRCC2) are associated with increased cancer risk and may allow us to make medical recommendations when mutations are identified.      [facility], the laboratory that performed the OvaNext genetic testing, will be performing the expanded panel as a reflex testing.     Although Hafsa's genetic testing result was negative, other relatives may still carry a gene mutation associated with cancers in their family. Genetic counseling is recommended for Hafsa's siblings to discuss genetic testing options.    Summary:  We do not have an explanation for Hafsa's ovarian cancer.  Because of that, it is important that she continue with cancer screening based on her personal and family history as discussed above.    Hafsa consented to proceed with the expanded cancer genetic test that contains 67 genes (including the 25 genes in the OvaNext panel).     Plan:  1. I provided Hafsa with a copy of her test results today, and a handout summarizing negative genetic test results (see after visit summary).  2. Hafsa consented to proceed with additional genetic testing (CancerNext - Expanded panel) through [facility].  3. Results will be available in 4-6 weeks and initially communicated over the telephone. Hafsa will return to clinic for detailed discussion afterwards.   4. Hafsa will follow up with our clinical nurse specialist, Stephie Karimi for high risk breast cancer screening.   5. Hafsa is encouraged to contact me if there are any changes to her  personal or family history.    If Hafsa has any further questions, I encouraged her to contact me at [phone].    Time spent face to face: 15 minutes.    Mariam Sams MS, [facility] [place]  Certified Genetic Counselor  T: [phone]  F: [phone]

## 2017-12-08 NOTE — LETTER
Cancer Risk Management  Program Locations    [facility] [facility]  [facility]  Mailing Address  Cancer Risk Management Program  [facility]  [address] [place] [address]    New patient appointments  [phone]  December 11, 2017    Hafsa Corona  [address]  [place] [address]      Dear Hafsa,    It was a pleasure meeting with you at the [facility] on 12/08/2017.  Here is a copy of the progress note from your recent genetic counseling visit to the Cancer Risk Management Program.  If you have any additional questions, please feel free to call.      12/8/2017    Referring Provider: Jarod Shaffer J., MD    Presenting Information:  Hafsa Corona returned to the Cancer Risk Management Program at the [facility] to discuss her genetic testing results. Her blood was drawn on 10/6/2017.  OvaNext genetic testing was ordered from [facility]. This testing was done because of her personal history of ovarian cancer and family history of multiple other cancers.     Genetic Testing Result: NEGATIVE  Hafsa is negative for mutations in the  COSTA, BARD1, BRCA1, BRCA2, BRIP1, CDH1, CHEK2, DICER1, EPCAM, MLH1, MRE11A, MSH2, MSH6, MUTYH, NBN, NF1, PALB2, PMS2, PTEN, RAD50, RAD51C, RAD51D, SMARCA4, STK11, and TP53  genes.    No mutations were found in any of the 25 genes analyzed.  This test involved sequencing and deletion/duplication analysis of all genes with the exception of EPCAM (deletions/duplications only).    Testing did not detect an identifiable mutation associated with  Hereditary Breast and Ovarian Cancer syndrome (BRCA1, BRCA2), Sullivan syndrome (MLH1, MSH2, MSH6, PMS2, EPCAM), Hereditary Diffuse Gastric Cancer (CDH1), Cowden syndrome (PTEN), Li Fraumeni syndrome (TP53), Peutz-Jeghers syndrome  "(STK11), MUTYH Associated Polyposis (MAP), or Neurofibromatosis type 1 (NF1).    A copy of the test report can be found in the Laboratory tab, dated 10/6/2017, and named \"SEND OUTS [facility] TEST\". The report is scanned in as a linked document.    Interpretation:  We discussed several different interpretations of this negative test result.    1. One explanation may be that there is a different gene or combination of genes and environment that are associated with the cancers in Hafsa and/or her relatives.    2. It is possible that one of her parents did have a mutation in one of the 25 genes and Hafsa did not inherit it.  3. There is also a small possibility that there is a mutation in one of these genes, and we could not find it with our current testing methods.       Screening:  Based on this negative test result, it is important for Hafsa and her relatives to refer back to the family history for appropriate cancer screening.      Based on the SHERON Risk Evaluation Model v8 that takes into consideration Hafsa's personal medical history as well as family history, Hafsa's estimated lifetime risk of breast cancer is 23.5%. NCCN Breast Cancer Screening and Diagnosis guideline version 1.2017 recommends women with >20% lifetime risk to receive high risk screening.   - clinical encounter every 6-12 months   - annual screening mammogram   - annual breast MRI    We discussed that Hafsa could participate in our Cancer Risk Management Program in which our clinical nurse specialist provides an individual screening plan and assists with medical management. A referral was made to see MARLON Tracy, for this service.    Other population cancer screening, such as recommendations by the American Cancer Society, are also appropriate for Hafsa and her family.  These screening recommendations may change if there are changes to Hafsa's personal and/or family history.  Final screening recommendations should be made by each " individual's managing physician.    Inheritance:  We reviewed the autosomal dominant inheritance of the OvaNext genes. Mutations in these genes do not skip generations.      Additional Testing Considerations:  In addition to the ovarian cancer, Hafsa was recently found to have a localized, enhancing renal mass that is concerning for renal cell carcinoma. She is planning on proceeding with robotic partial nephrectomy and is deciding where to receive her care.     We discussed in detail that it is possible Hasfa does carry a gene or combination of genes and environment that increase her risk for ovarian and/or renal cancer. We again reviewed the option of larger gene panels covering more moderate risk genes associated with these cancer. Hafsa expressed interest in pursuing further genetic testing to obtain as much information as possible.    Genetic testing is available for 67 genes associated with increased risk for breast, colon, ovarian, pancreatic, renal, uterine, and many other cancers: CancerNext-Expanded (AIP, ALK, APC, COSTA, BAP1, BARD1, BLM, BRCA1, BRCA2, BRIP1, BMPR1A, CDH1, CDK4, CDKN1B, CDKN2A, CHEK2, DICER1, EPCAM, FANCC, FH, FLCN, GALNT12, GREM1, HOXB13, MAX, MEN1, MET, MITF, MLH1, MRE11A, MSH2, MSH6, MUTYH, NBN, NF1, NF2, PALB2, PHOX2B, PMS2, POLD1, POLE, POT1, IVTOQ5K, PTCH1, PTEN, RAD50, RAD51C, RAD51D, RB1, RET, SDHA, SDHAF2, SDHB, SDHC, SDHD, SMAD4, SMARCA4, SMARCB1, SMARCE1, STK11, SUFU, [id number], TP53, TSC1, TSC2, VHL, and XRCC2).      We discussed that many of the genes in the CancerNext-Expanded panel are associated with specific hereditary cancer syndromes and have published management guidelines:  Hereditary Breast and Ovarian Cancer syndrome (BRCA1, BRCA2), Sullivan syndrome (MLH1, MSH2, MSH6, PMS2, EPCAM), Familial Adenomatous Polyposis (APC), Hereditary Diffuse Gastric Cancer (CDH1), Familial Atypical Multiple Mole Melanoma syndrome (CDK4, CDKN2A), Juvenile Polyposis syndrome (BMPR1A, SMAD4),  Cowden syndrome (PTEN), Li Fraumeni syndrome (TP53), Peutz-Jeghers syndrome (STK11), MUTYH Associated Polyposis (MUTYH),  Hereditary Leiomyomatosis and Renal Cell Cancer (FH), Oxqc-Kdrq-Cxhe (FLCN), Hereditary Papillary Renal Carcinoma (MET), Hereditary paraganglioma and pheochromocytoma syndrome (SDHA, SDHAF2, SDHB, SDHC, SDHD), Multiple Endocrine Neoplasia type 2 (RET), Tuberous sclerosis complex (TSC1, TSC2), Von Hippel-Lindau disease (VHL), Neurofibromatosis type 1 (NF1), Neurofibromatosis type 2 (NF1), Multiple endocrine neoplasia type 1 (MEN1), Multiple endocrine neoplasia type 4 (CDKN1B), Gorlin syndrome (SUFU, PTCH1), and Pineda complex (AIP, QTNOQ1H).    The COSTA, BLM, BRIP1, CHEK2, GALNT12, GREM1, NBN , PALB2, POLD1, POLE, RAD51C, and RAD51D genes are associated with increased cancer risk and have published management guidelines for certain cancers.      The remaining genes (ALK, BAP1, BARD1, DICER1, FANCC, HOXB13, MRE11A, MAX, MET, MITF, PHOX2B, POT1, RAD50, SMARCA4,SMARCB1, SMARCE1, [id number], and XRCC2) are associated with increased cancer risk and may allow us to make medical recommendations when mutations are identified.      [facility], the laboratory that performed the OvaNext genetic testing, will be performing the expanded panel as a reflex testing.     Although Hafsa's genetic testing result was negative, other relatives may still carry a gene mutation associated with cancers in their family. Genetic counseling is recommended for Hafsa's siblings to discuss genetic testing options.    Summary:  We do not have an explanation for Hafsa's ovarian cancer.  Because of that, it is important that she continue with cancer screening based on her personal and family history as discussed above.    Hafsa consented to proceed with the expanded cancer genetic test that contains 67 genes (including the 25 genes in the OvaNext panel).     Plan:  1. I provided Hafsa with a copy of her test results today, and a  handout summarizing negative genetic test results (see after visit summary).  2. Hafsa consented to proceed with additional genetic testing (CancerNext - Expanded panel) through [facility].  3. Results will be available in 4-6 weeks and initially communicated over the telephone. Hafsa will return to clinic for detailed discussion afterwards.   4. Hafsa will follow up with our clinical nurse specialist, Stephie Karimi for high risk breast cancer screening.   5. Hafsa is encouraged to contact me if there are any changes to her personal or family history.    If Hafsa has any further questions, I encouraged her to contact me at [phone].    Time spent face to face: 15 minutes.    Mariam Sams MS, [facility] [place]  Certified Genetic Counselor  T: [phone]  F: [phone]

## 2017-12-14 ENCOUNTER — OFFICE VISIT (OUTPATIENT)
Dept: PSYCHOLOGY | Facility: CLINIC | Age: 62
End: 2017-12-14
Attending: PSYCHOLOGIST
Payer: COMMERCIAL

## 2017-12-14 DIAGNOSIS — F43.20 ADJUSTMENT DISORDER, UNSPECIFIED TYPE: Primary | ICD-10-CM

## 2017-12-14 PROCEDURE — 25000128 H RX IP 250 OP 636: Performed by: NURSE PRACTITIONER

## 2017-12-14 PROCEDURE — 96523 IRRIG DRUG DELIVERY DEVICE: CPT

## 2017-12-14 PROCEDURE — 90834 PSYTX W PT 45 MINUTES: CPT | Mod: ZP | Performed by: PSYCHOLOGIST

## 2017-12-14 RX ORDER — HEPARIN SODIUM (PORCINE) LOCK FLUSH IV SOLN 100 UNIT/ML 100 UNIT/ML
5 SOLUTION INTRAVENOUS EVERY 8 HOURS
Status: DISCONTINUED | OUTPATIENT
Start: 2017-12-14 | End: 2017-12-22 | Stop reason: HOSPADM

## 2017-12-14 RX ADMIN — SODIUM CHLORIDE, PRESERVATIVE FREE 5 ML: 5 INJECTION INTRAVENOUS at 13:14

## 2017-12-14 NOTE — MR AVS SNAPSHOT
After Visit Summary   12/14/2017    Hafsa Corona    MRN: [id number]           Patient Information     Date Of Birth          1955        Visit Information        Provider Department      12/14/2017 12:00 PM Lola Vasquez PhD LP; UC 2 118 CONSULT [facility]        Today's Diagnoses     Adjustment disorder, unspecified type    -  1       Follow-ups after your visit        Your next 10 appointments already scheduled     Dec 18, 2017  2:15 PM CST   NEW GENERAL with Mary Jo Massey MD   Eye Clinic ([facility])    [address]  [address] [address]  [address] [address]  [facility] [address]   [phone]            Jan 04, 2018 12:00 PM CST   (Arrive by 11:45 AM)   RETURN WITH ROOM with Lola Vasquez PhD LP, UC 2 118 CONSULT [facility] ([facility])    [address]  [address] [facility] [id number] [phone]            Jan 11, 2018 12:00 PM CST   (Arrive by 11:45 AM)   RETURN WITH ROOM with Lola Vasquez PhD LP, UC 2 118 CONSULT [facility] ([facility])    [address]  [address] [facility] [address] [phone]            Jan 18, 2018 12:00 PM CST   (Arrive by 11:45 AM)   RETURN WITH ROOM with Lola Vasquez PhD LP, UC 2 118 CONSULT [facility] ([facility])    [address]  [address] [facility] [id number] [phone]              Who to contact     If you have questions or need follow up information about today's clinic visit or your schedule please contact [facility] directly at [phone].  Normal or non-critical lab and imaging results will be communicated to you by MyChart, letter or phone within 4 business days after the clinic has received the results. If you do not hear from us within 7 days, please contact the clinic through MyChart or  phone. If you have a critical or abnormal lab result, we will notify you by phone as soon as possible.  Submit refill requests through [employer] or call your pharmacy and they will forward the refill request to us. Please allow 3 business days for your refill to be completed.          Additional Information About Your Visit        Puzzliumhart Information     [employer] gives you secure access to your electronic health record. If you see a primary care provider, you can also send messages to your care team and make appointments. If you have questions, please call your primary care clinic.  If you do not have a primary care provider, please call [phone] and they will assist you.        Care EveryWhere ID     This is your Care EveryWhere ID. This could be used by other organizations to access your [facility] medical records  [id number]        Your Vitals Were     Last Period                   01/01/2009            Blood Pressure from Last 3 Encounters:   12/01/17 116/73   11/20/17 134/87   11/01/17 110/74    Weight from Last 3 Encounters:   12/01/17 85.7 kg (189 lb)   11/20/17 85.8 kg (189 lb 3.2 oz)   11/01/17 81.4 kg (179 lb 6 oz)              Today, you had the following     No orders found for display         Today's Medication Changes          These changes are accurate as of: 12/14/17  7:27 PM.  If you have any questions, ask your nurse or doctor.               These medicines have changed or have updated prescriptions.        Dose/Directions    metFORMIN 500 MG 24 hr tablet   Commonly known as:  GLUCOPHAGE-XR   This may have changed:    - how much to take  - when to take this  - additional instructions   Used for:  Type 2 diabetes mellitus without complication, without long-term current use of insulin (H)        Take two tabs by mouth once daily with evening meal.   Quantity:  180 tablet   Refills:  PRN                Primary Care Provider Office Phone # Fax #    Morelia Ayon [phone]        [address] [facility] [address]        Equal Access to Services     RONEY ARANDA : Hadii serena joyner hadterecortez Sorejiali, waaxda luqadaha, qaybta delphinexochiltfrancesca palm, sujatha cavazosgeorgieken friedman. So [facility] [phone].    ATENCIÓN: Si habla español, tiene a martínez disposición servicios gratuitos de asistencia lingüística. Llame al [phone].    We comply with applicable federal civil rights laws and [place] laws. We do not discriminate on the basis of race, color, national origin, age, disability, sex, sexual orientation, or gender identity.            Thank you!     Thank you for choosing [facility]  for your care. Our goal is always to provide you with excellent care. Hearing back from our patients is one way we can continue to improve our services. Please take a few minutes to complete the written survey that you may receive in the mail after your visit with us. Thank you!             Your Updated Medication List - Protect others around you: Learn how to safely use, store and throw away your medicines at www.disposemymeds.org.          This list is accurate as of: 12/14/17  7:27 PM.  Always use your most recent med list.                   Brand Name Dispense Instructions for use Diagnosis    atorvastatin 80 MG tablet    LIPITOR    90 tablet    Take 1 tablet (80 mg) by mouth daily    Hyperlipidemia LDL goal <100       blood glucose lancets standard    no brand specified    100 each    Use to test blood sugar 2 times daily or as directed.    Type 2 diabetes mellitus without complication, without long-term current use of insulin (H)       blood glucose monitoring meter device kit    no brand specified    1 kit    Use to test blood sugar 2 times daily or as directed.    Type 2 diabetes mellitus without complication, without long-term current use of insulin (H)       blood glucose monitoring test strip    no brand specified    100 strip    Use to test blood sugars 2 times daily or as  directed    Type 2 diabetes mellitus without complication, without long-term current use of insulin (H)       CALCIUM-MAGNESIUM-VITAMIN D PO      Take 2 tablets by mouth daily        EPINEPHrine 0.3 MG/0.3ML injection 2-pack    EPIPEN/ADRENACLICK/or ANY BX GENERIC EQUIV    1 each    Inject 0.3 mLs (0.3 mg) into the muscle once as needed for anaphylaxis    Bee allergy status       escitalopram 20 MG tablet    LEXAPRO    30 tablet    Take 1 tablet (20 mg) by mouth daily    Major depressive disorder, recurrent episode, mild (H)       fluticasone 50 MCG/ACT spray    FLONASE    48 mL    SHAKE WELL AND USE 2 SPRAYS IN EACH NOSTRIL DAILY    Chronic maxillary sinusitis       gabapentin 100 MG capsule    NEURONTIN    42 capsule    Take 1-2 capsules (100-200 mg) by mouth 3 times daily Start the day after chemo for one week    Pain in joint, multiple sites       L-GLUTAMINE PO      Take 2 g by mouth 2 times daily        lisinopril 10 MG tablet    PRINIVIL/ZESTRIL    90 tablet    Take 1 tablet (10 mg) by mouth daily    Essential hypertension       loratadine 10 MG tablet    CLARITIN    30 tablet    Take 1 tablet (10 mg) by mouth daily    Pain in joint, multiple sites       LORazepam 1 MG tablet    ATIVAN    30 tablet    Take 1 tablet (1 mg) by mouth every 6 hours as needed (nausea/vomiting, anxiety or sleep)    Ovarian cancer, left (H), Encounter for long-term (current) use of medications       metFORMIN 500 MG 24 hr tablet    GLUCOPHAGE-XR    180 tablet    Take two tabs by mouth once daily with evening meal.    Type 2 diabetes mellitus without complication, without long-term current use of insulin (H)       MULTIVITAMIN ADULT PO      Take 1 tablet by mouth daily        order for DME     1 Units    Equipment being ordered: blood pressure monitor    Essential hypertension       * order for DME     1 each    Compression stockings 20-30 mm Hg. To be wear when directed by Hematology team and while awake for the first two years post  "clot.    Long-term (current) use of anticoagulants, Acute deep vein thrombosis (DVT) of left lower extremity, unspecified vein (H)       * order for DME     1 Bottle    Plain packing strip 1/4\"    Abscess of leg       * order for DME     1 Box    Sterile zoey tipped applicators    Abscess of leg       * order for DME     3 Bottle    USE 1/4 INCH  WIDTH PLAIN NU GAUZE PACKING TO DO SELF WOUND CARE OF LEFT LOWER LEG ONCE DAILY. DIMENSIONS OF WOUND PRESENTLY ARE 2 X 2 INCH AND APPROXIMATELY 1/2 INCH DEEP.  USES 6 INCHES OF PACKING CURRENTLY FOR DRESSING CHANGE.  FAX TO [employer] [phone]    Wound of left leg       * order for DME     30 pad    STERILE ADHESIVE PAD BANDAGE AT LEAST 4X4 INCH IN SIZE NEEDED FOR DAILY WOUND CARE TO LEFT LOWER LEG. FAX TO [email or web address] [phone]    Leg wound, left       * order for DME     1 Units    Home blood pressure monitor    Type 2 diabetes mellitus without complication, without long-term current use of insulin (H)       PRO-BIOTIC BLEND PO      Take 1 capsule by mouth daily Enzymatic Therapy-probiotic pearls        prochlorperazine 10 MG tablet    COMPAZINE    30 tablet    Take 1 tablet (10 mg) by mouth every 6 hours as needed (nausea/vomiting)    Ovarian cancer, left (H), Encounter for long-term (current) use of medications       rivaroxaban ANTICOAGULANT 20 MG Tabs tablet    XARELTO    30 tablet    Take 1 tablet (20 mg) by mouth daily (with dinner)    Long-term (current) use of anticoagulants, Acute deep vein thrombosis (DVT) of left lower extremity, unspecified vein (H)       traZODone 50 MG tablet    DESYREL    180 tablet    TAKE 1 TO 2 TABLETS(50  MG) BY MOUTH EVERY NIGHT AS NEEDED FOR SLEEP    Insomnia, unspecified type       VITAMIN B-COMPLEX PO           vitamin D 1000 UNITS capsule      Take 2,000 Units by mouth daily        VYVANSE PO      Take 50 mg by mouth daily        * Notice:  This list has 6 medication(s) that are the same as other medications " prescribed for you. Read the directions carefully, and ask your doctor or other care provider to review them with you.

## 2017-12-14 NOTE — PROGRESS NOTES
"Psychology Progress Note  Counseling (#6 ).  INDIV TH 50 min.  INTERV pr solving, support.  DX: F43.23  AR Health, relationships  S \"going to go to [place].. Haven't heard back from them. . Some peace. . \" \"my family is understanding better\" \"he's very supportive, generous\"  O No immediate health procedures planned until New Year.  Renewal of old relationship valuable.  Mood elevated.  A Looking ahead to expected surgery.  Feeling as if support had increased.  GOAL Move forward with planning for kidney issue.  Plan.  RT 3-4 wks.  TP    "

## 2017-12-15 ENCOUNTER — DOCUMENTATION ONLY (OUTPATIENT)
Dept: OTHER | Facility: CLINIC | Age: 62
End: 2017-12-15

## 2017-12-15 PROBLEM — Z71.89 ACP (ADVANCE CARE PLANNING): Chronic | Status: ACTIVE | Noted: 2017-12-15

## 2017-12-18 ENCOUNTER — OFFICE VISIT (OUTPATIENT)
Dept: OPHTHALMOLOGY | Facility: CLINIC | Age: 62
End: 2017-12-18
Attending: OPHTHALMOLOGY
Payer: COMMERCIAL

## 2017-12-18 DIAGNOSIS — H25.12 AGE-RELATED NUCLEAR CATARACT, LEFT: Primary | ICD-10-CM

## 2017-12-18 DIAGNOSIS — H26.9 CATARACT: Primary | ICD-10-CM

## 2017-12-18 NOTE — MR AVS SNAPSHOT
After Visit Summary   12/18/2017    Hafsa Corona    MRN: [id number]           Patient Information     Date Of Birth          1955        Visit Information        Provider Department      12/18/2017 2:15 PM Mary Jo Massey MD [facility]        Today's Diagnoses     Age-related nuclear cataract, left    -  1       Follow-ups after your visit        Your next 10 appointments already scheduled     Jan 04, 2018 12:00 PM CST   (Arrive by 11:45 AM)   RETURN WITH ROOM with Lola Vasquez PhD LP,  2 118 CONSULT [facility] ([facility])    [address] [place] [address]  [address] [facility] [id number] [phone]            Jan 11, 2018 12:00 PM CST   (Arrive by 11:45 AM)   RETURN WITH ROOM with Lola Vasquez PhD LP,  2 118 CONSULT [facility] ([facility])    [address] [place] [id number] [phone]            Jan 18, 2018 12:00 PM CST   (Arrive by 11:45 AM)   RETURN WITH ROOM with Lola Vasquez PhD LP,  2 118 CONSULT [facility] ([facility])    [address] [id number] [phone]              Future tests that were ordered for you today     Open Future Orders        Priority Expected Expires Ordered    IOL Biometry w/ IOL calc OU (both eye) Routine  6/19/2019 12/18/2017    Brightness Acuity Testing (BAT) Routine  6/18/2019 12/18/2017            Who to contact     Please call your clinic at [phone] to:    Ask questions about your health    Make or cancel appointments    Discuss your medicines    Learn about your test results    Speak to your doctor   If you have compliments or concerns about an experience at your clinic, or if you wish to file a complaint, please contact [facility] Patient Relations at [phone] or email us at  [email or web address]         Additional Information About Your Visit        [employer] Information     [employer] gives you secure access to your electronic health record. If you see a primary care provider, you can also send messages to your care team and make appointments. If you have questions, please call your primary care clinic.  If you do not have a primary care provider, please call [phone] and they will assist you.      [employer] is an electronic gateway that provides easy, online access to your medical records. With [employer], you can request a clinic appointment, read your test results, renew a prescription or communicate with your care team.     To access your existing account, please contact your [facility] or call [phone] for assistance.        Care EveryWhere ID     This is your Care EveryWhere ID. This could be used by other organizations to access your [place] medical records  [id number]        Your Vitals Were     Last Period                   01/01/2009            Blood Pressure from Last 3 Encounters:   12/01/17 116/73   11/20/17 134/87   11/01/17 110/74    Weight from Last 3 Encounters:   12/01/17 85.7 kg (189 lb)   11/20/17 85.8 kg (189 lb 3.2 oz)   11/01/17 81.4 kg (179 lb 6 oz)              Today, you had the following     No orders found for display         Today's Medication Changes          These changes are accurate as of: 12/18/17  5:16 PM.  If you have any questions, ask your nurse or doctor.               These medicines have changed or have updated prescriptions.        Dose/Directions    metFORMIN 500 MG 24 hr tablet   Commonly known as:  GLUCOPHAGE-XR   This may have changed:    - how much to take  - when to take this  - additional instructions   Used for:  Type 2 diabetes mellitus without complication, without long-term current use of insulin (H)        Take two tabs by mouth once daily with evening meal.   Quantity:  180 tablet    Refills:  PRN                Primary Care Provider Office Phone # Fax #    Morelia Ayon, [phone] [address] [place]  [facility] [address]        Equal Access to Services     RONEY ARANDA : Hadii serena ku lindao Soomaali, waaxda luqadaha, qaybta kaalmada adeegyada, waxbrent idiin troyn erika bragg alpesh friedman. So [facility] [phone].    ATENCIÓN: Si habla español, tiene a martínez disposición servicios gratuitos de asistencia lingüística. Llame al [phone].    We comply with applicable federal civil rights laws and [place] laws. We do not discriminate on the basis of race, color, national origin, age, disability, sex, sexual orientation, or gender identity.            Thank you!     Thank you for choosing EYE CLINIC  for your care. Our goal is always to provide you with excellent care. Hearing back from our patients is one way we can continue to improve our services. Please take a few minutes to complete the written survey that you may receive in the mail after your visit with us. Thank you!             Your Updated Medication List - Protect others around you: Learn how to safely use, store and throw away your medicines at www.disposemymeds.org.          This list is accurate as of: 12/18/17  5:16 PM.  Always use your most recent med list.                   Brand Name Dispense Instructions for use Diagnosis    atorvastatin 80 MG tablet    LIPITOR    90 tablet    Take 1 tablet (80 mg) by mouth daily    Hyperlipidemia LDL goal <100       blood glucose lancets standard    no brand specified    100 each    Use to test blood sugar 2 times daily or as directed.    Type 2 diabetes mellitus without complication, without long-term current use of insulin (H)       blood glucose monitoring meter device kit    no brand specified    1 kit    Use to test blood sugar 2 times daily or as directed.    Type 2 diabetes mellitus without complication, without long-term current use of insulin (H)       blood  glucose monitoring test strip    no brand specified    100 strip    Use to test blood sugars 2 times daily or as directed    Type 2 diabetes mellitus without complication, without long-term current use of insulin (H)       CALCIUM-MAGNESIUM-VITAMIN D PO      Take 2 tablets by mouth daily        EPINEPHrine 0.3 MG/0.3ML injection 2-pack    EPIPEN/ADRENACLICK/or ANY BX GENERIC EQUIV    1 each    Inject 0.3 mLs (0.3 mg) into the muscle once as needed for anaphylaxis    Bee allergy status       escitalopram 20 MG tablet    LEXAPRO    30 tablet    Take 1 tablet (20 mg) by mouth daily    Major depressive disorder, recurrent episode, mild (H)       fluticasone 50 MCG/ACT spray    FLONASE    48 mL    SHAKE WELL AND USE 2 SPRAYS IN EACH NOSTRIL DAILY    Chronic maxillary sinusitis       gabapentin 100 MG capsule    NEURONTIN    42 capsule    Take 1-2 capsules (100-200 mg) by mouth 3 times daily Start the day after chemo for one week    Pain in joint, multiple sites       L-GLUTAMINE PO      Take 2 g by mouth 2 times daily        lisinopril 10 MG tablet    PRINIVIL/ZESTRIL    90 tablet    Take 1 tablet (10 mg) by mouth daily    Essential hypertension       loratadine 10 MG tablet    CLARITIN    30 tablet    Take 1 tablet (10 mg) by mouth daily    Pain in joint, multiple sites       LORazepam 1 MG tablet    ATIVAN    30 tablet    Take 1 tablet (1 mg) by mouth every 6 hours as needed (nausea/vomiting, anxiety or sleep)    Ovarian cancer, left (H), Encounter for long-term (current) use of medications       metFORMIN 500 MG 24 hr tablet    GLUCOPHAGE-XR    180 tablet    Take two tabs by mouth once daily with evening meal.    Type 2 diabetes mellitus without complication, without long-term current use of insulin (H)       MULTIVITAMIN ADULT PO      Take 1 tablet by mouth daily        order for DME     1 Units    Equipment being ordered: blood pressure monitor    Essential hypertension       * order for DME     1 each     "Compression stockings 20-30 mm Hg. To be wear when directed by Hematology team and while awake for the first two years post clot.    Long-term (current) use of anticoagulants, Acute deep vein thrombosis (DVT) of left lower extremity, unspecified vein (H)       * order for DME     1 Bottle    Plain packing strip 1/4\"    Abscess of leg       * order for DME     1 Box    Sterile zoey tipped applicators    Abscess of leg       * order for DME     3 Bottle    USE 1/4 INCH  WIDTH PLAIN NU GAUZE PACKING TO DO SELF WOUND CARE OF LEFT LOWER LEG ONCE DAILY. DIMENSIONS OF WOUND PRESENTLY ARE 2 X 2 INCH AND APPROXIMATELY 1/2 INCH DEEP.  USES 6 INCHES OF PACKING CURRENTLY FOR DRESSING CHANGE.  FAX TO [employer] [phone]    Wound of left leg       * order for DME     30 pad    STERILE ADHESIVE PAD BANDAGE AT LEAST 4X4 INCH IN SIZE NEEDED FOR DAILY WOUND CARE TO LEFT LOWER LEG. FAX TO [employer] [phone]    Leg wound, left       * order for DME     1 Units    Home blood pressure monitor    Type 2 diabetes mellitus without complication, without long-term current use of insulin (H)       PRO-BIOTIC BLEND PO      Take 1 capsule by mouth daily Enzymatic Therapy-probiotic pearls        prochlorperazine 10 MG tablet    COMPAZINE    30 tablet    Take 1 tablet (10 mg) by mouth every 6 hours as needed (nausea/vomiting)    Ovarian cancer, left (H), Encounter for long-term (current) use of medications       rivaroxaban ANTICOAGULANT 20 MG Tabs tablet    XARELTO    30 tablet    Take 1 tablet (20 mg) by mouth daily (with dinner)    Long-term (current) use of anticoagulants, Acute deep vein thrombosis (DVT) of left lower extremity, unspecified vein (H)       traZODone 50 MG tablet    DESYREL    180 tablet    TAKE 1 TO 2 TABLETS(50  MG) BY MOUTH EVERY NIGHT AS NEEDED FOR SLEEP    Insomnia, unspecified type       VITAMIN B-COMPLEX PO           vitamin D 1000 UNITS capsule      Take 2,000 Units by mouth daily        " VYVANSE PO      Take 50 mg by mouth daily        * Notice:  This list has 6 medication(s) that are the same as other medications prescribed for you. Read the directions carefully, and ask your doctor or other care provider to review them with you.

## 2017-12-18 NOTE — PROGRESS NOTES
No exam was performed today. Had patient discussion regarding cataract surgery. She has been seen in the past by Dr. Swenson with visually significant cataract in the left eye. She does not have any issues in the right eye currently. Her past medical history is complicated by ovarian cancer s/p surgery and chemotherapy within the last year. She is now in remission. On follow-up CT, a renal mass was noted, possibly a second cancer. She is to undergo surgery for renal biopsy in January, but she is not sure when.    We discussed that if her renal biopsy is in late January, we could consider doing the surgery prior to that, but if her renal biopsy is in early January, it would be best to wait until after she is recovered from that.     She also had trouble getting testing with Dr. Swenson. She describes an immersion A-scan which made her flinch, and it could not be completed. She is sensitive about being touched around her eye. We discussed options for anesthesia, and that a good intermediate option for her would likely be MAC with a retrobulbar block.    She will schedule her renal surgery and then call us to schedule a full cataract evaluation pending that date.    Mary Jo Massey MD  Comprehensive Ophthalmology & Ocular Pathology  Department of Ophthalmology and Visual Neurosciences  [email or web address]  Pager [phone]

## 2017-12-19 ENCOUNTER — TELEPHONE (OUTPATIENT)
Dept: OPHTHALMOLOGY | Facility: CLINIC | Age: 62
End: 2017-12-19

## 2017-12-19 NOTE — TELEPHONE ENCOUNTER
Left message with release of information number  Provided triage number for further assistance  Second opinion and will need release from pt to get records from previous clinic  No release in system as of 12-19-17-- reviewed with facilitator  [facility] if pt calls clinic back they may provide our LUIS MANUEL number also  Trevin Null RN 10:04 AM 12/19/17

## 2017-12-19 NOTE — TELEPHONE ENCOUNTER
----- Message from Cate Keyes sent at 12/19/2017  8:31 AM CST -----  Regarding: Bryson - Request for signed LUIS MANUEL   Good morning team!    Renate with [facility] is requesting we send over a signed LUIS MANUEL.  Please fax to [phone].  Please call Renate with any questions at [phone].      Kind Regards    Cate     Please DO NOT send this message and/or reply back to sender. Call Center Representatives DO NOT respond to messages.

## 2017-12-26 ENCOUNTER — MYC MEDICAL ADVICE (OUTPATIENT)
Dept: FAMILY MEDICINE | Facility: CLINIC | Age: 62
End: 2017-12-26

## 2017-12-26 ENCOUNTER — E-VISIT (OUTPATIENT)
Dept: FAMILY MEDICINE | Facility: CLINIC | Age: 62
End: 2017-12-26
Payer: COMMERCIAL

## 2017-12-26 ENCOUNTER — TELEPHONE (OUTPATIENT)
Dept: FAMILY MEDICINE | Facility: CLINIC | Age: 62
End: 2017-12-26

## 2017-12-26 DIAGNOSIS — R30.0 DYSURIA: Primary | ICD-10-CM

## 2017-12-26 DIAGNOSIS — R30.0 DYSURIA: ICD-10-CM

## 2017-12-26 LAB

## 2017-12-26 PROCEDURE — 99444 ZZC PHYSICIAN ONLINE EVALUATION & MANAGEMENT SERVICE: CPT | Performed by: FAMILY MEDICINE

## 2017-12-26 PROCEDURE — 81001 URINALYSIS AUTO W/SCOPE: CPT | Performed by: FAMILY MEDICINE

## 2017-12-26 RX ORDER — CIPROFLOXACIN 500 MG/1
500 TABLET, FILM COATED ORAL 2 TIMES DAILY
Qty: 14 TABLET | Refills: 0 | Status: SHIPPED | OUTPATIENT
Start: 2017-12-26 | End: 2018-01-02

## 2017-12-26 NOTE — PROGRESS NOTES
Reviewed u/a , await sensitivities, recommending Cipro.    Emmanuel Bal MD  Family Medicine Physician

## 2017-12-26 NOTE — TELEPHONE ENCOUNTER
Writer called patient and LVM instructing patient to call clinic back so message from Emmanuel Bal MD below can be relayed. MyChart sent to patient as well.    Thanks! Kristal Capps RN

## 2017-12-26 NOTE — TELEPHONE ENCOUNTER
SHW as DOD can you review the E visit request for UTI treatment from this MANUEL patient?      C/o UTI dysuria and today hematuria. SX onset Sunday but taking cranberry tablets and pushing fluids.  Does not think she has any fever or chills.      Medical hx is complex. Seen at [facility] for ovarian CA in past and now has a right kidney mass.  Surgery planned end of Jan.     Does she need an UC visit if the E visit not acceptable?  She does not get these UTI's often.(had one last year)   She had sexual activity Saturday and thinks this predisposed her to a probable UTI.  Shoshana Tena RN

## 2017-12-26 NOTE — MR AVS SNAPSHOT
After Visit Summary   12/26/2017    Hafsa Corona    MRN: [id number]           Patient Information     Date Of Birth          1955        Visit Information        Provider Department      12/26/2017 1:12 PM Morelia Ayon NP [facility]        Today's Diagnoses     Dysuria    -  1       Follow-ups after your visit        Your next 10 appointments already scheduled     Jan 04, 2018 12:00 PM CST   (Arrive by 11:45 AM)   RETURN WITH ROOM with Lola Vasquez PhD LP, UC 2 118 CONSULT [facility] ([facility])    [address] [place] [id number] [phone]            Jan 11, 2018 12:00 PM CST   (Arrive by 11:45 AM)   RETURN WITH ROOM with Lola Vasquez PhD LP,  2 118 CONSULT [facility] ([facility])    [address] [place] [id number] [phone]            Jan 18, 2018 12:00 PM CST   (Arrive by 11:45 AM)   RETURN WITH ROOM with Lola Vasquez PhD LP,  2 118 CONSULT [facility] ([facility])    [address] [place] [id number] [phone]              Who to contact     If you have questions or need follow up information about today's clinic visit or your schedule please contact [facility] directly at [phone].  Normal or non-critical lab and imaging results will be communicated to you by MyChart, letter or phone within 4 business days after the clinic has received the results. If you do not hear from us within 7 days, please contact the clinic through MyChart or phone. If you have a critical or abnormal lab result, we will notify you by phone as soon as possible.  Submit refill requests through [employer] or call your pharmacy and they will forward the refill request to us. Please allow 3 business days for your refill to be  completed.          Additional Information About Your Visit        [employer]hart Information     [employer] gives you secure access to your electronic health record. If you see a primary care provider, you can also send messages to your care team and make appointments. If you have questions, please call your primary care clinic.  If you do not have a primary care provider, please call [phone] and they will assist you.        Care EveryWhere ID     This is your Care EveryWhere ID. This could be used by other organizations to access your [place] medical records  [id number]        Your Vitals Were     Last Period                   01/01/2009            Blood Pressure from Last 3 Encounters:   12/01/17 116/73   11/20/17 134/87   11/01/17 110/74    Weight from Last 3 Encounters:   12/01/17 189 lb (85.7 kg)   11/20/17 189 lb 3.2 oz (85.8 kg)   11/01/17 179 lb 6 oz (81.4 kg)              Today, you had the following     No orders found for display         Today's Medication Changes          These changes are accurate as of: 12/26/17 11:59 PM.  If you have any questions, ask your nurse or doctor.               Start taking these medicines.        Dose/Directions    * ciprofloxacin 500 MG tablet   Commonly known as:  CIPRO   Used for:  Dysuria        Dose:  500 mg   Take 1 tablet (500 mg) by mouth 2 times daily for 7 days   Quantity:  14 tablet   Refills:  0       * ciprofloxacin 500 MG tablet   Commonly known as:  CIPRO   Used for:  Dysuria   Started by:  Morelia Ayon NP        Dose:  500 mg   Take 1 tablet (500 mg) by mouth 2 times daily for 7 days   Quantity:  14 tablet   Refills:  0       * Notice:  This list has 2 medication(s) that are the same as other medications prescribed for you. Read the directions carefully, and ask your doctor or other care provider to review them with you.      These medicines have changed or have updated prescriptions.        Dose/Directions    metFORMIN 500 MG 24 hr tablet   Commonly  known as:  GLUCOPHAGE-XR   This may have changed:    - how much to take  - when to take this  - additional instructions   Used for:  Type 2 diabetes mellitus without complication, without long-term current use of insulin (H)        Take two tabs by mouth once daily with evening meal.   Quantity:  180 tablet   Refills:  PRN            Where to get your medicines      These medications were sent to [id number] - [place], [place] - [address] AT [facility]  [address], [place] [address]    Hours:  24-hours Phone:  [phone]     ciprofloxacin 500 MG tablet    ciprofloxacin 500 MG tablet                Primary Care Provider Office Phone # Fax #    Morelia HERRERA ATTILA Ayon [phone] [address] [facility] [address]        Equal Access to Services     RONEY ARANDA AH: Hadii aad ar hadasho Soomaali, waaxda luqadaha, qaybta kaalmada adeegyada, sujatha morse hayalessandro betts . So [facility] [phone].    ATENCIÓN: Si habla español, tiene a martínez disposición servicios gratuitos de asistencia lingüística. Elizabeth al [phone].    We comply with applicable federal civil rights laws and [place] laws. We do not discriminate on the basis of race, color, national origin, age, disability, sex, sexual orientation, or gender identity.            Thank you!     Thank you for choosing [facility]  for your care. Our goal is always to provide you with excellent care. Hearing back from our patients is one way we can continue to improve our services. Please take a few minutes to complete the written survey that you may receive in the mail after your visit with us. Thank you!             Your Updated Medication List - Protect others around you: Learn how to safely use, store and throw away your medicines at www.disposemymeds.org.          This list is accurate as of: 12/26/17 11:59 PM.  Always use your most recent med list.                    Brand Name Dispense Instructions for use Diagnosis    atorvastatin 80 MG tablet    LIPITOR    90 tablet    Take 1 tablet (80 mg) by mouth daily    Hyperlipidemia LDL goal <100       blood glucose lancets standard    no brand specified    100 each    Use to test blood sugar 2 times daily or as directed.    Type 2 diabetes mellitus without complication, without long-term current use of insulin (H)       blood glucose monitoring meter device kit    no brand specified    1 kit    Use to test blood sugar 2 times daily or as directed.    Type 2 diabetes mellitus without complication, without long-term current use of insulin (H)       blood glucose monitoring test strip    no brand specified    100 strip    Use to test blood sugars 2 times daily or as directed    Type 2 diabetes mellitus without complication, without long-term current use of insulin (H)       CALCIUM-MAGNESIUM-VITAMIN D PO      Take 2 tablets by mouth daily        * ciprofloxacin 500 MG tablet    CIPRO    14 tablet    Take 1 tablet (500 mg) by mouth 2 times daily for 7 days    Dysuria       * ciprofloxacin 500 MG tablet    CIPRO    14 tablet    Take 1 tablet (500 mg) by mouth 2 times daily for 7 days    Dysuria       EPINEPHrine 0.3 MG/0.3ML injection 2-pack    EPIPEN/ADRENACLICK/or ANY BX GENERIC EQUIV    1 each    Inject 0.3 mLs (0.3 mg) into the muscle once as needed for anaphylaxis    Bee allergy status       escitalopram 20 MG tablet    LEXAPRO    30 tablet    Take 1 tablet (20 mg) by mouth daily    Major depressive disorder, recurrent episode, mild (H)       fluticasone 50 MCG/ACT spray    FLONASE    48 mL    SHAKE WELL AND USE 2 SPRAYS IN EACH NOSTRIL DAILY    Chronic maxillary sinusitis       gabapentin 100 MG capsule    NEURONTIN    42 capsule    Take 1-2 capsules (100-200 mg) by mouth 3 times daily Start the day after chemo for one week    Pain in joint, multiple sites       L-GLUTAMINE PO      Take 2 g by mouth 2 times daily        lisinopril 10  "MG tablet    PRINIVIL/ZESTRIL    90 tablet    Take 1 tablet (10 mg) by mouth daily    Essential hypertension       loratadine 10 MG tablet    CLARITIN    30 tablet    Take 1 tablet (10 mg) by mouth daily    Pain in joint, multiple sites       LORazepam 1 MG tablet    ATIVAN    30 tablet    Take 1 tablet (1 mg) by mouth every 6 hours as needed (nausea/vomiting, anxiety or sleep)    Ovarian cancer, left (H), Encounter for long-term (current) use of medications       metFORMIN 500 MG 24 hr tablet    GLUCOPHAGE-XR    180 tablet    Take two tabs by mouth once daily with evening meal.    Type 2 diabetes mellitus without complication, without long-term current use of insulin (H)       MULTIVITAMIN ADULT PO      Take 1 tablet by mouth daily        order for DME     1 Units    Equipment being ordered: blood pressure monitor    Essential hypertension       * order for DME     1 each    Compression stockings 20-30 mm Hg. To be wear when directed by Hematology team and while awake for the first two years post clot.    Long-term (current) use of anticoagulants, Acute deep vein thrombosis (DVT) of left lower extremity, unspecified vein (H)       * order for DME     1 Bottle    Plain packing strip 1/4\"    Abscess of leg       * order for DME     1 Box    Sterile zoey tipped applicators    Abscess of leg       * order for DME     3 Bottle    USE 1/4 INCH  WIDTH PLAIN NU GAUZE PACKING TO DO SELF WOUND CARE OF LEFT LOWER LEG ONCE DAILY. DIMENSIONS OF WOUND PRESENTLY ARE 2 X 2 INCH AND APPROXIMATELY 1/2 INCH DEEP.  USES 6 INCHES OF PACKING CURRENTLY FOR DRESSING CHANGE.  FAX TO [employer] [phone]    Wound of left leg       * order for DME     30 pad    STERILE ADHESIVE PAD BANDAGE AT LEAST 4X4 INCH IN SIZE NEEDED FOR DAILY WOUND CARE TO LEFT LOWER LEG. FAX TO [facility] [phone]    Leg wound, left       * order for DME     1 Units    Home blood pressure monitor    Type 2 diabetes mellitus without complication, " without long-term current use of insulin (H)       PRO-BIOTIC BLEND PO      Take 1 capsule by mouth daily Enzymatic Therapy-probiotic pearls        prochlorperazine 10 MG tablet    COMPAZINE    30 tablet    Take 1 tablet (10 mg) by mouth every 6 hours as needed (nausea/vomiting)    Ovarian cancer, left (H), Encounter for long-term (current) use of medications       rivaroxaban ANTICOAGULANT 20 MG Tabs tablet    XARELTO    30 tablet    Take 1 tablet (20 mg) by mouth daily (with dinner)    Long-term (current) use of anticoagulants, Acute deep vein thrombosis (DVT) of left lower extremity, unspecified vein (H)       traZODone 50 MG tablet    DESYREL    180 tablet    TAKE 1 TO 2 TABLETS(50  MG) BY MOUTH EVERY NIGHT AS NEEDED FOR SLEEP    Insomnia, unspecified type       VITAMIN B-COMPLEX PO           vitamin D 1000 UNITS capsule      Take 2,000 Units by mouth daily        VYVANSE PO      Take 50 mg by mouth daily        * Notice:  This list has 8 medication(s) that are the same as other medications prescribed for you. Read the directions carefully, and ask your doctor or other care provider to review them with you.

## 2018-01-02 ENCOUNTER — TELEPHONE (OUTPATIENT)
Dept: ONCOLOGY | Facility: CLINIC | Age: 63
End: 2018-01-02

## 2018-01-02 LAB — LAB SCANNED RESULT: NORMAL

## 2018-01-02 NOTE — TELEPHONE ENCOUNTER
12/22/2017    I called Hafsa with regards to the additional genetic test results and left a message asking her to give me a call back.  If I do not hear back from her in two weeks, I'll give her another call.       Mariam Sams MS, [facility]  Certified Genetic Counselor  T: [phone]  F: [phone]

## 2018-01-03 ENCOUNTER — MYC MEDICAL ADVICE (OUTPATIENT)
Dept: FAMILY MEDICINE | Facility: CLINIC | Age: 63
End: 2018-01-03

## 2018-01-04 ENCOUNTER — OFFICE VISIT (OUTPATIENT)
Dept: PSYCHOLOGY | Facility: CLINIC | Age: 63
End: 2018-01-04
Attending: PSYCHOLOGIST
Payer: COMMERCIAL

## 2018-01-04 DIAGNOSIS — F43.20 ADJUSTMENT DISORDER, UNSPECIFIED TYPE: Primary | ICD-10-CM

## 2018-01-04 PROCEDURE — 40000114 ZZH STATISTIC NO CHARGE CLINIC VISIT

## 2018-01-04 PROCEDURE — 90834 PSYTX W PT 45 MINUTES: CPT | Mod: ZP | Performed by: PSYCHOLOGIST

## 2018-01-04 NOTE — MR AVS SNAPSHOT
After Visit Summary   1/4/2018    Hafsa Corona    MRN: [id number]           Patient Information     Date Of Birth          1955        Visit Information        Provider Department      1/4/2018 12:00 PM Lola Vasquez PhD LP; UC 2 118 CONSULT [facility]        Today's Diagnoses     Adjustment disorder, unspecified type    -  1       Follow-ups after your visit        Your next 10 appointments already scheduled     Jan 11, 2018 12:00 PM CST   (Arrive by 11:45 AM)   RETURN WITH ROOM with Lola Vasquez PhD LP, UC 2 118 CONSULT [facility] ([facility])    [address] [place] [address]  [address]  [facility] [address] [phone]            Jan 18, 2018 12:00 PM CST   (Arrive by 11:45 AM)   RETURN WITH ROOM with Lola Vasquez PhD LP, UC 2 118 CONSULT [facility] ([facility])    [address] [place] [address]  [address]  [facility] [address] [phone]              Who to contact     If you have questions or need follow up information about today's clinic visit or your schedule please contact [facility] directly at [phone].  Normal or non-critical lab and imaging results will be communicated to you by [employer], letter or phone within 4 business days after the clinic has received the results. If you do not hear from us within 7 days, please contact the clinic through [employer] or phone. If you have a critical or abnormal lab result, we will notify you by phone as soon as possible.  Submit refill requests through [employer]House or call your pharmacy and they will forward the refill request to us. Please allow 3 business days for your refill to be completed.          Additional Information About Your Visit        [employer] Information     [employer] gives you secure access to your electronic health record. If you see a primary care provider, you can also send messages to your  care team and make appointments. If you have questions, please call your primary care clinic.  If you do not have a primary care provider, please call [phone] and they will assist you.        Care EveryWhere ID     This is your Care EveryWhere ID. This could be used by other organizations to access your [place] medical records  [id number]        Your Vitals Were     Last Period                   01/01/2009            Blood Pressure from Last 3 Encounters:   12/01/17 116/73   11/20/17 134/87   11/01/17 110/74    Weight from Last 3 Encounters:   12/01/17 85.7 kg (189 lb)   11/20/17 85.8 kg (189 lb 3.2 oz)   11/01/17 81.4 kg (179 lb 6 oz)              Today, you had the following     No orders found for display         Today's Medication Changes          These changes are accurate as of: 1/4/18  2:57 PM.  If you have any questions, ask your nurse or doctor.               These medicines have changed or have updated prescriptions.        Dose/Directions    metFORMIN 500 MG 24 hr tablet   Commonly known as:  GLUCOPHAGE-XR   This may have changed:    - how much to take  - when to take this  - additional instructions   Used for:  Type 2 diabetes mellitus without complication, without long-term current use of insulin (H)        Take two tabs by mouth once daily with evening meal.   Quantity:  180 tablet   Refills:  PRN                Primary Care Provider Office Phone # Fax #    Morelia JAVIER Ayon [phone] [address] [facility] [address]        Equal Access to Services     RONEY ARANDA AH: Hadii serena mckeono Soaxel, waaxda luqadaha, qaybta kaalmada erikayafrancesca, sujatha betts . So [facility] [phone].    ATENCIÓN: Si habla español, tiene a martínez disposición servicios gratuitos de asistencia lingüística. Llame al [phone].    We comply with applicable federal civil rights laws and [place] laws. We do not discriminate on the basis of race, color, national  origin, age, disability, sex, sexual orientation, or gender identity.            Thank you!     Thank you for choosing [facility]  for your care. Our goal is always to provide you with excellent care. Hearing back from our patients is one way we can continue to improve our services. Please take a few minutes to complete the written survey that you may receive in the mail after your visit with us. Thank you!             Your Updated Medication List - Protect others around you: Learn how to safely use, store and throw away your medicines at www.disposemymeds.org.          This list is accurate as of: 1/4/18  2:57 PM.  Always use your most recent med list.                   Brand Name Dispense Instructions for use Diagnosis    atorvastatin 80 MG tablet    LIPITOR    90 tablet    Take 1 tablet (80 mg) by mouth daily    Hyperlipidemia LDL goal <100       blood glucose lancets standard    no brand specified    100 each    Use to test blood sugar 2 times daily or as directed.    Type 2 diabetes mellitus without complication, without long-term current use of insulin (H)       blood glucose monitoring meter device kit    no brand specified    1 kit    Use to test blood sugar 2 times daily or as directed.    Type 2 diabetes mellitus without complication, without long-term current use of insulin (H)       blood glucose monitoring test strip    no brand specified    100 strip    Use to test blood sugars 2 times daily or as directed    Type 2 diabetes mellitus without complication, without long-term current use of insulin (H)       CALCIUM-MAGNESIUM-VITAMIN D PO      Take 2 tablets by mouth daily        EPINEPHrine 0.3 MG/0.3ML injection 2-pack    EPIPEN/ADRENACLICK/or ANY BX GENERIC EQUIV    1 each    Inject 0.3 mLs (0.3 mg) into the muscle once as needed for anaphylaxis    Bee allergy status       escitalopram 20 MG tablet    LEXAPRO    30 tablet    Take 1 tablet (20 mg) by mouth daily    Major depressive  "disorder, recurrent episode, mild (H)       fluticasone 50 MCG/ACT spray    FLONASE    48 mL    SHAKE WELL AND USE 2 SPRAYS IN EACH NOSTRIL DAILY    Chronic maxillary sinusitis       gabapentin 100 MG capsule    NEURONTIN    42 capsule    Take 1-2 capsules (100-200 mg) by mouth 3 times daily Start the day after chemo for one week    Pain in joint, multiple sites       L-GLUTAMINE PO      Take 2 g by mouth 2 times daily        lisinopril 10 MG tablet    PRINIVIL/ZESTRIL    90 tablet    Take 1 tablet (10 mg) by mouth daily    Essential hypertension       loratadine 10 MG tablet    CLARITIN    30 tablet    Take 1 tablet (10 mg) by mouth daily    Pain in joint, multiple sites       LORazepam 1 MG tablet    ATIVAN    30 tablet    Take 1 tablet (1 mg) by mouth every 6 hours as needed (nausea/vomiting, anxiety or sleep)    Ovarian cancer, left (H), Encounter for long-term (current) use of medications       metFORMIN 500 MG 24 hr tablet    GLUCOPHAGE-XR    180 tablet    Take two tabs by mouth once daily with evening meal.    Type 2 diabetes mellitus without complication, without long-term current use of insulin (H)       MULTIVITAMIN ADULT PO      Take 1 tablet by mouth daily        order for DME     1 Units    Equipment being ordered: blood pressure monitor    Essential hypertension       * order for DME     1 each    Compression stockings 20-30 mm Hg. To be wear when directed by Hematology team and while awake for the first two years post clot.    Long-term (current) use of anticoagulants, Acute deep vein thrombosis (DVT) of left lower extremity, unspecified vein (H)       * order for DME     1 Bottle    Plain packing strip 1/4\"    Abscess of leg       * order for DME     1 Box    Sterile zoey tipped applicators    Abscess of leg       * order for DME     3 Bottle    USE 1/4 INCH  WIDTH PLAIN NU GAUZE PACKING TO DO SELF WOUND CARE OF LEFT LOWER LEG ONCE DAILY. DIMENSIONS OF WOUND PRESENTLY ARE 2 X 2 INCH AND " APPROXIMATELY 1/2 INCH DEEP.  USES 6 INCHES OF PACKING CURRENTLY FOR DRESSING CHANGE.  FAX TO [facility] [phone]    Wound of left leg       * order for DME     30 pad    STERILE ADHESIVE PAD BANDAGE AT LEAST 4X4 INCH IN SIZE NEEDED FOR DAILY WOUND CARE TO LEFT LOWER LEG. FAX TO [facility] [phone]    Leg wound, left       * order for DME     1 Units    Home blood pressure monitor    Type 2 diabetes mellitus without complication, without long-term current use of insulin (H)       PRO-BIOTIC BLEND PO      Take 1 capsule by mouth daily Enzymatic Therapy-probiotic pearls        prochlorperazine 10 MG tablet    COMPAZINE    30 tablet    Take 1 tablet (10 mg) by mouth every 6 hours as needed (nausea/vomiting)    Ovarian cancer, left (H), Encounter for long-term (current) use of medications       rivaroxaban ANTICOAGULANT 20 MG Tabs tablet    XARELTO    30 tablet    Take 1 tablet (20 mg) by mouth daily (with dinner)    Long-term (current) use of anticoagulants, Acute deep vein thrombosis (DVT) of left lower extremity, unspecified vein (H)       traZODone 50 MG tablet    DESYREL    180 tablet    TAKE 1 TO 2 TABLETS(50  MG) BY MOUTH EVERY NIGHT AS NEEDED FOR SLEEP    Insomnia, unspecified type       VITAMIN B-COMPLEX PO           vitamin D 1000 UNITS capsule      Take 2,000 Units by mouth daily        VYVANSE PO      Take 50 mg by mouth daily        * Notice:  This list has 6 medication(s) that are the same as other medications prescribed for you. Read the directions carefully, and ask your doctor or other care provider to review them with you.

## 2018-01-04 NOTE — PROGRESS NOTES
"Psychology Progress Note  Counseling (#1 - 2018).  INDIV TH 50 min.  INTERV pr solving, support.  DX: F43.23  MD STRESS  S \"best friend, Teresita. . [place]. . I\"m Christianity. . Her , MAGGIE, retired. . Use to go there often, helped me back to the [place]\" (?) \"surgery set for end of January. . In Hospital     2-4 days, don't have anyone at home. . My sister even said she might come. . Would like that\"  \"insurance problems . . With work. . Fighting these. . Will work out. . Said after the surgery most people return to work in 1-2 wks\"  \"I have so many friends. . Different groups\"  O Optimistic. Ebullient. Pleased with holiday experiences.  Support + spirituality both meaningful/helpful for her.  Affect somewhat incongruent with pending surgery.  A Has set a date for laparoscopic surgery for kidney mass; will be performed at [facility] the end of January.  Holiday season was meaningful for her, primarily due to friends + family has 'softened' and even suggested may (sister in [place]) come for surgery.  She would appreciate this.  [place] group includes theme of support and spirituality, apparently buoyant experiences for her.  GOAL Continue to use support pre/post surgery.  Resolve insurance issues.  Plan.  RT 1 wk.  TP   9.12.2017    "

## 2018-01-09 ENCOUNTER — TELEPHONE (OUTPATIENT)
Dept: ONCOLOGY | Facility: CLINIC | Age: 63
End: 2018-01-09

## 2018-01-09 NOTE — TELEPHONE ENCOUNTER
Due for K+ and creatinine, please call and schedule a lab only appt.  thanks  
LISINOPRIL 10 MG      Last Written Prescription Date: 2-4-16  Last Fill Quantity: 90, # refills: 3  Last Office Visit with [facility] or [facility] prescribing provider: 1-12-17       POTASSIUM   Date Value Ref Range Status   02/04/2016 4.2 3.4 - 5.3 mmol/L Final     CREATININE   Date Value Ref Range Status   02/04/2016 0.76 0.52 - 1.04 mg/dL Final     BP Readings from Last 3 Encounters:   01/12/17 120/78   09/06/16 110/77   07/19/16 128/72       
Left message on answering machine that medication was approved and sent to pharmacy and that a lab only appointment must be scheduled prior to additional refills given.   Allyson Mcgraw     
2/2 orthostatic hypotension/moderate assist (50% patients effort)/minimum assist (75% patients effort)

## 2018-01-09 NOTE — LETTER
Cancer Risk Management  Program [facility]  [facility]  [facility]  [facility]  Mailing Address  Cancer Risk Management Program  [facility]  [address] [place] [address]    New patient appointments  [phone]  January 10, 2018    Hafsa Corona  [address]  [place] [address]      Dear Hafsa,    It was a pleasure speaking with you on 1/9/2018.  Here is a copy of the progress note from our discussion regarding the additional genetic test results. If you have any additional questions, please feel free to call.      1/9/2018 1/9/2018    Referring Provider: Jarod Shaffer MD    Presenting Information:  I spoke with Hafsa to discuss additional genetic testing results. Additional genetic testing was sent based on her recent diagnosis of renal cancer in addition to ovarian cancer. For details, please refer to my progress note of 12/8/2017. CancerNext-Expanded testing was ordered from [employer].    Genetic Testing Result: NEGATIVE  Hafsa is negative for mutations in the AIP, ALK, APC, COSTA, BAP1, BARD1, BLM, BRCA1, BRCA2, BRIP1, BMPR1A, CDH1, CDK4, CDKN1B, CDKN2A, CHEK2, DICER1, EPCAM, FANCC, FH, FLCN, GALNT12, GREM1, HOXB13, MAX, MEN1, MET, MITF, MLH1, MRE11A, MSH2, MSH6, MUTYH, NBN, NF1, NF2, PALB2, PHOX2B, PMS2, POLD1, POLE, POT1, CKFBT0H, PTCH1, PTEN, RAD50, RAD51C, RAD51D, RB1, RET, SDHA, SDHAF2, SDHB, SDHC, SDHD, SMAD4, SMARCA4, SMARCB1, SMARCE1, STK11, SUFU, HAFI119, TP53, TSC1, TSC2, VHL, and XRCC2 genes. No mutations were found in any of the 67 genes analyzed.  This test involved sequencing and deletion/duplication analysis of all genes with the exceptions of EPCAM and GREM1 (deletions/duplications only) and MITF (only the status of the c.952G>A (p.E318K) alteration is analyzed and  reported).    Testing did not detect an identifiable mutation associated with Hereditary Breast and Ovarian Cancer syndrome (BRCA1, BRCA2), Sullivan syndrome (MLH1, MSH2, MSH6, PMS2, EPCAM), Familial Adenomatous Polyposis (APC), Hereditary Diffuse Gastric Cancer (CDH1), Familial Atypical Multiple Mole Melanoma syndrome (CDK4, CDKN2A), Juvenile Polyposis syndrome (BMPR1A, SMAD4), Cowden syndrome (PTEN), Li Fraumeni syndrome (TP53), Peutz-Jeghers syndrome (STK11), MUTYH Associated Polyposis (MUTYH),  Hereditary Leiomyomatosis and Renal Cell Cancer (FH), Jdsa-Slvs-Gjns (FLCN), Hereditary Papillary Renal Carcinoma (MET), Hereditary paraganglioma and pheochromocytoma syndrome (SDHA, SDHAF2, SDHB, SDHC, SDHD), Multiple Endocrine Neoplasia type 2 (RET), Tuberous sclerosis complex (TSC1, TSC2), Von Hippel-Lindau disease (VHL), Neurofibromatosis type 1 (NF1), Neurofibromatosis type 2 (NF1), Multiple endocrine neoplasia type 1 (MEN1), Multiple endocrine neoplasia type 4 (CDKN1B), Gorlin syndrome (SUFU, PTCH1), or Pineda complex (AIP, [id number]).    A copy of the test report can be found in Laboratory tab - SEND OUTS [facility] [place] [place] TEST 10/06/2017.      Interpretation:  We discussed several different interpretations of this negative test result.    1. One explanation may be that there is a different gene or combination of genes and environment that are associated with the cancers in Hafsa and/or her relatives.    2. It is possible that one of her parents did have a mutation in one of the 67 genes and she did not inherit it.  3. There is also a small possibility that there is a mutation in one of these genes, and we could not find it with our current testing methods.       Screening:  Based on this negative test result, it is important for Hafsa and her relatives to refer back to the family history for appropriate cancer screening. We reviewed the screening recommendations for her and her family members that we had discussed in her previous  visit. For details, please refer to my progress note of 12/8/2017. At her last visit, she decided to participate in our Cancer Risk Management Program in which our clinical nurse specialist provides an individual screening plan and assists with medical management. Hafsa informed me that she has not heard from the scheduling team regarding setting up an appointment. I have sent an inquiry to the scheduling team to get her scheduled with MARLON Tracy.    Inheritance:  We reviewed the autosomal dominant inheritance of the genes in the CancerNext panel.  Mutations in these genes do not skip generations.      Additional Testing Considerations:  Genetic testing is rapidly advancing, and new cancer susceptibility genes will most likely be identified in the future. Therefore, Hafsa is encouraged to contact me annually or if there are changes in her personal or family history. In addition, due to Hafsa's personal history of multiple cancers and also family history of multiple cancers, genetic counseling can be considered for Hafsa's four siblings.     Summary:  We do not have an explanation for Hafsa's ovarian cancer or renal cancer.  Because of that, it is important that she continue with cancer screening based on her personal and family history as discussed above.    Plan:  1.  A copy of her test results and a handout summarizing negative genetic test results will be mailed to Hafsa.  2. She plans to follow-up with Stephie Karimi, clinical nurse specialist.  3. She should contact me annually, or sooner if her family history changes.    If Hafsa has any further questions, I encouraged her to contact me at [phone].    Mariam Sams MS, [facility] [place]  Certified Genetic Counselor  T: [phone]  F: [phone]                  Negative Genetic Test Result    Genetic Testing  You had a blood test that looked at the genetic information in one or more genes associated with increased cancer risk.  The testing looked for any  harmful changes that would stop this particular gene from working like it should. If an individual does not have any harmful changes or variants of unknown significance found from their blood test, their genetic test result is reported as negative.       Results  The genetic test did not identify any pathogenic (harmful) changes in the genes that were tested. There are several possible explanations for a negative test result. Without knowing the gene mutation in your family, the cause of the cancer in you or your relatives is still unknown. Your genetic counselor can help interpret the result for you and your relatives. In this case, there are several reasons that may explain the negative test result:    There may be a gene mutation in the family that you did not inherit.     You may have a gene mutation in a different gene that was not included in the test, or has not yet been discovered.     The cancers in you or your family may be due to a combination of genetic factors and environment (multifactorial/familial).    The cancers in you or your family may be sporadic/random cancers.    There is very small chance that a mutation was not found by current testing methods.  As testing technology evolves over time, it may still be possible to identify a mutation in a gene that was not found on this test.    It is important to note which genes were included in your test. A list of these genes can be found on your test result.    Screening Recommendations  Due to this negative test result, cancer screening recommendations should be based on your personal and family history. This may include increased cancer screening for you and/or your family members. Your genetic counselor and health care provider can help make appropriate recommendations.      Please call us if you have any questions or concerns.     Cancer Risk Management Program  [phone] ([phone]  ? Francesca Spence MS, [facility] [place]  [phone]  ? Olesya Wilcox,  MS, [facility] [place]  [phone]  ? Dang Valera, MS, [facility] [place]  [phone]  ? Raven Concepcion, MS, [facility] [place]  [phone]  ? Mariam Sams, MS, [facility] [place]  [phone]

## 2018-01-09 NOTE — TELEPHONE ENCOUNTER
1/9/2018    I left a message on Hafsa's voicemail requesting a call back to discuss additional genetic test results that are available for her.      Mariam Sams MS, [facility] [place]  Certified Genetic Counselor  T: [phone]  F: [phone]

## 2018-01-09 NOTE — Clinical Note
Please print and send a copy of this letter and the patient's genetic testing report to the patient.    Please enclose test report: Send outs misc test [[id number]] (Order [id number])

## 2018-01-09 NOTE — TELEPHONE ENCOUNTER
1/9/2018    Referring Provider: Jarod Shaffer MD    Presenting Information:  I spoke with Hafsa to discuss additional genetic testing results. Additional genetic testing was sent based on her recent diagnosis of renal cancer in addition to ovarian cancer. For details, please refer to my progress note of 12/8/2017. CancerNext-Expanded testing was ordered from [employer].    Genetic Testing Result: NEGATIVE  Hafsa is negative for mutations in the AIP, ALK, APC, COSTA, BAP1, BARD1, BLM, BRCA1, BRCA2, BRIP1, BMPR1A, CDH1, CDK4, CDKN1B, CDKN2A, CHEK2, DICER1, EPCAM, FANCC, FH, FLCN, GALNT12, GREM1, HOXB13, MAX, MEN1, MET, MITF, MLH1, MRE11A, MSH2, MSH6, MUTYH, NBN, NF1, NF2, PALB2, PHOX2B, PMS2, POLD1, POLE, POT1, YNLUO1K, PTCH1, PTEN, RAD50, RAD51C, RAD51D, RB1, RET, SDHA, SDHAF2, SDHB, SDHC, SDHD, SMAD4, SMARCA4, SMARCB1, SMARCE1, STK11, SUFU, MPKS025, TP53, TSC1, TSC2, VHL, and XRCC2 genes. No mutations were found in any of the 67 genes analyzed.  This test involved sequencing and deletion/duplication analysis of all genes with the exceptions of EPCAM and GREM1 (deletions/duplications only) and MITF (only the status of the c.952G>A (p.E318K) alteration is analyzed and reported).    Testing did not detect an identifiable mutation associated with Hereditary Breast and Ovarian Cancer syndrome (BRCA1, BRCA2), Sullivan syndrome (MLH1, MSH2, MSH6, PMS2, EPCAM), Familial Adenomatous Polyposis (APC), Hereditary Diffuse Gastric Cancer (CDH1), Familial Atypical Multiple Mole Melanoma syndrome (CDK4, CDKN2A), Juvenile Polyposis syndrome (BMPR1A, SMAD4), Cowden syndrome (PTEN), Li Fraumeni syndrome (TP53), Peutz-Jeghers syndrome (STK11), MUTYH Associated Polyposis (MUTYH),  Hereditary Leiomyomatosis and Renal Cell Cancer (FH), Jqid-Wqbr-Ctuo (FLCN), Hereditary Papillary Renal Carcinoma (MET), Hereditary paraganglioma and pheochromocytoma syndrome (SDHA, SDHAF2, SDHB, SDHC, SDHD), Multiple Endocrine Neoplasia type 2 (RET),  Tuberous sclerosis complex (TSC1, TSC2), Von Hippel-Lindau disease (VHL), Neurofibromatosis type 1 (NF1), Neurofibromatosis type 2 (NF1), Multiple endocrine neoplasia type 1 (MEN1), Multiple endocrine neoplasia type 4 (CDKN1B), Gorlin syndrome (SUFU, PTCH1), or Pineda complex (AIP, [id number]).    A copy of the test report can be found in Laboratory tab - [place] [facility] [place].      Interpretation:  We discussed several different interpretations of this negative test result.    1. One explanation may be that there is a different gene or combination of genes and environment that are associated with the cancers in Hafsa and/or her relatives.    2. It is possible that one of her parents did have a mutation in one of the 67 genes and she did not inherit it.  3. There is also a small possibility that there is a mutation in one of these genes, and we could not find it with our current testing methods.       Screening:  Based on this negative test result, it is important for Hafsa and her relatives to refer back to the family history for appropriate cancer screening. We reviewed the screening recommendations for her and her family members that we had discussed in her previous visit. For details, please refer to my progress note of 12/8/2017. At her last visit, she decided to participate in our Cancer Risk Management Program in which our clinical nurse specialist provides an individual screening plan and assists with medical management. Hafsa informed me that she has not heard from the scheduling team regarding setting up an appointment. I have sent an inquiry to the scheduling team to get her scheduled with MARLON Tracy.    Inheritance:  We reviewed the autosomal dominant inheritance of the genes in the CancerNext panel.  Mutations in these genes do not skip generations.      Additional Testing Considerations:  Genetic testing is rapidly advancing, and new cancer susceptibility genes will most likely be  identified in the future. Therefore, Hafsa is encouraged to contact me annually or if there are changes in her personal or family history. In addition, due to Hafsa's personal history of multiple cancers and also family history of multiple cancers, genetic counseling can be considered for Hafsa's four siblings.     Summary:  We do not have an explanation for Hafsa's ovarian cancer or renal cancer.  Because of that, it is important that she continue with cancer screening based on her personal and family history as discussed above.    Plan:  1.  A copy of her test results and a handout summarizing negative genetic test results will be mailed to Hafsa.  2. She plans to follow-up with Stephie Karimi, clinical nurse specialist.  3. She should contact me annually, or sooner if her family history changes.    If Hafsa has any further questions, I encouraged her to contact me at [phone].    Mariam Sams MS, [facility] [place]  Certified Genetic Counselor  T: [phone]  F: [phone]

## 2018-01-11 ENCOUNTER — RADIANT APPOINTMENT (OUTPATIENT)
Dept: ULTRASOUND IMAGING | Facility: CLINIC | Age: 63
End: 2018-01-11
Attending: INTERNAL MEDICINE
Payer: COMMERCIAL

## 2018-01-11 ENCOUNTER — OFFICE VISIT (OUTPATIENT)
Dept: PSYCHOLOGY | Facility: CLINIC | Age: 63
End: 2018-01-11
Attending: PSYCHOLOGIST
Payer: COMMERCIAL

## 2018-01-11 DIAGNOSIS — M79.662 PAIN OF LEFT LOWER LEG: ICD-10-CM

## 2018-01-11 DIAGNOSIS — F43.20 ADJUSTMENT DISORDER, UNSPECIFIED TYPE: Primary | ICD-10-CM

## 2018-01-11 DIAGNOSIS — M79.662 PAIN OF LEFT LOWER LEG: Primary | ICD-10-CM

## 2018-01-11 PROCEDURE — 90834 PSYTX W PT 45 MINUTES: CPT | Mod: ZP | Performed by: PSYCHOLOGIST

## 2018-01-11 PROCEDURE — 40000114 ZZH STATISTIC NO CHARGE CLINIC VISIT

## 2018-01-11 NOTE — MR AVS SNAPSHOT
After Visit Summary   1/11/2018    Hafsa Corona    MRN: [id number]           Patient Information     Date Of Birth          1955        Visit Information        Provider Department      1/11/2018 12:00 PM Lola Vasquez, PhD LP;  2 [address] CONSULT [facility]        Today's Diagnoses     Adjustment disorder, unspecified type    -  1       Follow-ups after your visit        Your next 10 appointments already scheduled     Jan 18, 2018 12:00 PM CST   (Arrive by 11:45 AM)   RETURN WITH ROOM with Lola Vasquez PhD LP,  2 [address] CONSULT [facility]    [address]  [address]  [place] [address] [phone]              Who to contact     If you have questions or need follow up information about today's clinic visit or your schedule please contact [facility] directly at [phone].  Normal or non-critical lab and imaging results will be communicated to you by MyChart, letter or phone within 4 business days after the clinic has received the results. If you do not hear from us within 7 days, please contact the clinic through [employer] or phone. If you have a critical or abnormal lab result, we will notify you by phone as soon as possible.  Submit refill requests through [employer] or call your pharmacy and they will forward the refill request to us. Please allow 3 business days for your refill to be completed.          Additional Information About Your Visit        MyChart Information     [employer] gives you secure access to your electronic health record. If you see a primary care provider, you can also send messages to your care team and make appointments. If you have questions, please call your primary care clinic.  If you do not have a primary care provider, please call [phone] and they will assist you.        Care EveryWhere ID     This is your Care EveryWhere ID. This could be used by  other organizations to access your [place] medical records  [id number]        Your Vitals Were     Last Period                   01/01/2009            Blood Pressure from Last 3 Encounters:   12/01/17 116/73   11/20/17 134/87   11/01/17 110/74    Weight from Last 3 Encounters:   12/01/17 85.7 kg (189 lb)   11/20/17 85.8 kg (189 lb 3.2 oz)   11/01/17 81.4 kg (179 lb 6 oz)              Today, you had the following     No orders found for display         Today's Medication Changes          These changes are accurate as of: 1/11/18  4:08 PM.  If you have any questions, ask your nurse or doctor.               These medicines have changed or have updated prescriptions.        Dose/Directions    metFORMIN 500 MG 24 hr tablet   Commonly known as:  GLUCOPHAGE-XR   This may have changed:    - how much to take  - when to take this  - additional instructions   Used for:  Type 2 diabetes mellitus without complication, without long-term current use of insulin (H)        Take two tabs by mouth once daily with evening meal.   Quantity:  180 tablet   Refills:  PRN                Primary Care Provider Office Phone # Fax #    Morelia Ayon [phone] [address] [place] [address]        Equal Access to Services     RONEY ARANDA AH: Ward mckeono Soaxel, waaxda luqadaha, qaybta kaalmada adeegyada, sujatha friedman. So [facility] [phone].    ATENCIÓN: Si habla español, tiene a martínez disposición servicios gratPinon Health Centeros de asistencia lingüística. Lleugenio al [phone].    We comply with applicable federal civil rights laws and [place] laws. We do not discriminate on the basis of race, color, national origin, age, disability, sex, sexual orientation, or gender identity.            Thank you!     Thank you for choosing [facility]  for your care. Our goal is always to provide you with excellent care. Hearing back from our patients is one way we can  continue to improve our services. Please take a few minutes to complete the written survey that you may receive in the mail after your visit with us. Thank you!             Your Updated Medication List - Protect others around you: Learn how to safely use, store and throw away your medicines at www.disposemymeds.org.          This list is accurate as of: 1/11/18  4:08 PM.  Always use your most recent med list.                   Brand Name Dispense Instructions for use Diagnosis    atorvastatin 80 MG tablet    LIPITOR    90 tablet    Take 1 tablet (80 mg) by mouth daily    Hyperlipidemia LDL goal <100       blood glucose lancets standard    no brand specified    100 each    Use to test blood sugar 2 times daily or as directed.    Type 2 diabetes mellitus without complication, without long-term current use of insulin (H)       blood glucose monitoring meter device kit    no brand specified    1 kit    Use to test blood sugar 2 times daily or as directed.    Type 2 diabetes mellitus without complication, without long-term current use of insulin (H)       blood glucose monitoring test strip    no brand specified    100 strip    Use to test blood sugars 2 times daily or as directed    Type 2 diabetes mellitus without complication, without long-term current use of insulin (H)       CALCIUM-MAGNESIUM-VITAMIN D PO      Take 2 tablets by mouth daily        EPINEPHrine 0.3 MG/0.3ML injection 2-pack    EPIPEN/ADRENACLICK/or ANY BX GENERIC EQUIV    1 each    Inject 0.3 mLs (0.3 mg) into the muscle once as needed for anaphylaxis    Bee allergy status       escitalopram 20 MG tablet    LEXAPRO    30 tablet    Take 1 tablet (20 mg) by mouth daily    Major depressive disorder, recurrent episode, mild (H)       fluticasone 50 MCG/ACT spray    FLONASE    48 mL    SHAKE WELL AND USE 2 SPRAYS IN EACH NOSTRIL DAILY    Chronic maxillary sinusitis       gabapentin 100 MG capsule    NEURONTIN    42 capsule    Take 1-2 capsules (100-200 mg)  "by mouth 3 times daily Start the day after chemo for one week    Pain in joint, multiple sites       L-GLUTAMINE PO      Take 2 g by mouth 2 times daily        lisinopril 10 MG tablet    PRINIVIL/ZESTRIL    90 tablet    Take 1 tablet (10 mg) by mouth daily    Essential hypertension       loratadine 10 MG tablet    CLARITIN    30 tablet    Take 1 tablet (10 mg) by mouth daily    Pain in joint, multiple sites       LORazepam 1 MG tablet    ATIVAN    30 tablet    Take 1 tablet (1 mg) by mouth every 6 hours as needed (nausea/vomiting, anxiety or sleep)    Ovarian cancer, left (H), Encounter for long-term (current) use of medications       metFORMIN 500 MG 24 hr tablet    GLUCOPHAGE-XR    180 tablet    Take two tabs by mouth once daily with evening meal.    Type 2 diabetes mellitus without complication, without long-term current use of insulin (H)       MULTIVITAMIN ADULT PO      Take 1 tablet by mouth daily        order for DME     1 Units    Equipment being ordered: blood pressure monitor    Essential hypertension       * order for DME     1 each    Compression stockings 20-30 mm Hg. To be wear when directed by Hematology team and while awake for the first two years post clot.    Long-term (current) use of anticoagulants, Acute deep vein thrombosis (DVT) of left lower extremity, unspecified vein (H)       * order for DME     1 Bottle    Plain packing strip 1/4\"    Abscess of leg       * order for DME     1 Box    Sterile zoey tipped applicators    Abscess of leg       * order for DME     3 Bottle    USE 1/4 INCH  WIDTH PLAIN NU GAUZE PACKING TO DO SELF WOUND CARE OF LEFT LOWER LEG ONCE DAILY. DIMENSIONS OF WOUND PRESENTLY ARE 2 X 2 INCH AND APPROXIMATELY 1/2 INCH DEEP.  USES 6 INCHES OF PACKING CURRENTLY FOR DRESSING CHANGE.  FAX TO [employer] [phone]    Wound of left leg       * order for DME     30 pad    STERILE ADHESIVE PAD BANDAGE AT LEAST 4X4 INCH IN SIZE NEEDED FOR DAILY WOUND CARE TO LEFT LOWER LEG. " FAX TO [facility] [employer] -[phone]    Leg wound, left       * order for DME     1 Units    Home blood pressure monitor    Type 2 diabetes mellitus without complication, without long-term current use of insulin (H)       PRO-BIOTIC BLEND PO      Take 1 capsule by mouth daily Enzymatic Therapy-probiotic pearls        prochlorperazine 10 MG tablet    COMPAZINE    30 tablet    Take 1 tablet (10 mg) by mouth every 6 hours as needed (nausea/vomiting)    Ovarian cancer, left (H), Encounter for long-term (current) use of medications       rivaroxaban ANTICOAGULANT 20 MG Tabs tablet    XARELTO    30 tablet    Take 1 tablet (20 mg) by mouth daily (with dinner)    Long-term (current) use of anticoagulants, Acute deep vein thrombosis (DVT) of left lower extremity, unspecified vein (H)       traZODone 50 MG tablet    DESYREL    180 tablet    TAKE 1 TO 2 TABLETS(50  MG) BY MOUTH EVERY NIGHT AS NEEDED FOR SLEEP    Insomnia, unspecified type       VITAMIN B-COMPLEX PO           vitamin D 1000 UNITS capsule      Take 2,000 Units by mouth daily        VYVANSE PO      Take 50 mg by mouth daily        * Notice:  This list has 6 medication(s) that are the same as other medications prescribed for you. Read the directions carefully, and ask your doctor or other care provider to review them with you.

## 2018-01-11 NOTE — PROGRESS NOTES
"Psychology Progress Note  Counseling (#2 - 2018).  INDIV TH 50 min.  INTERV pr solving, support.  DX:F43.23   MS Loss  S \"I was a rebel\" \"my father had a stroke (at 63yo). . 2002. . 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  Appropriately concerned.  Some tearfulness associated with prior losses (e.g., father, friend R). Time of reflection assoc with anniversaries of losses.  A Health and loss issues promoting reflection on experiences and relationships in her life that have been important.  In many ways appreciates many experiences she has had.  Sees self as not especially fitting in with her family.  Looking ahead to probably surgery in next few wks at [place].  GOAL Reflect on losses.  Prepare for surgery.  Plan.  RT 1 wk.  TP  .2017    "

## 2018-01-18 ENCOUNTER — OFFICE VISIT (OUTPATIENT)
Dept: PSYCHOLOGY | Facility: CLINIC | Age: 63
End: 2018-01-18
Attending: PSYCHOLOGIST
Payer: COMMERCIAL

## 2018-01-18 DIAGNOSIS — F43.20 ADJUSTMENT DISORDER, UNSPECIFIED TYPE: Primary | ICD-10-CM

## 2018-01-18 PROCEDURE — 96523 IRRIG DRUG DELIVERY DEVICE: CPT

## 2018-01-18 PROCEDURE — 25000128 H RX IP 250 OP 636: Performed by: PSYCHOLOGIST

## 2018-01-18 PROCEDURE — 90834 PSYTX W PT 45 MINUTES: CPT | Mod: ZP | Performed by: PSYCHOLOGIST

## 2018-01-18 RX ORDER — HEPARIN SODIUM (PORCINE) LOCK FLUSH IV SOLN 100 UNIT/ML 100 UNIT/ML
5 SOLUTION INTRAVENOUS
Status: COMPLETED | OUTPATIENT
Start: 2018-01-18 | End: 2018-01-18

## 2018-01-18 RX ADMIN — SODIUM CHLORIDE, PRESERVATIVE FREE 5 ML: 5 INJECTION INTRAVENOUS at 11:45

## 2018-01-18 NOTE — NURSING NOTE
"Chief Complaint   Patient presents with     Port Flush     port accessed and flushed by rn.        Port accessed with 20g 3/4\" gripper needle, flushed with saline and heparin.  Port de-accessed.    Vielka Sidhu RN    "

## 2018-01-18 NOTE — PROGRESS NOTES
"Psychology Progress Note  Counseling (#2 - 2018).  INDIV TH 50 min.  INTERV pr solving, support.  DX: F43.23  NM STRESS:  Planning ahead, insurance  S \"they told be I wouldn't be covered. . Figured it out. . Surgery next WED, hospital on TUE. . Leave Fri or Sat\"  \"I can do it\"  \"the only Hafsa's back\"  O Buoyant.  Affect: positive   Mood:  Hopeful, optimistic, more confident  A Prepared for surgery next wk at [facility]; has attended to many associated details.  Financial/insurance changes have been challenging but close to being resolved.  Feeling positive about her ability to problem solve and take action as needed.  GOAL Surgery, recovery.  Plan.  RT post surgery/immediate recovery.  TP  9.12.2017    "

## 2018-01-18 NOTE — MR AVS SNAPSHOT
After Visit Summary   1/18/2018    Hafsa Corona    MRN: [id number]           Patient Information     Date Of Birth          1955        Visit Information        Provider Department      1/18/2018 12:00 PM Lola Vasquez, PhD LP; [facility] [address] [facility]        Today's Diagnoses     Adjustment disorder, unspecified type    -  1       Follow-ups after your visit        Who to contact     If you have questions or need follow up information about today's clinic visit or your schedule please contact [facility] directly at [phone].  Normal or non-critical lab and imaging results will be communicated to you by [employer], letter or phone within 4 business days after the clinic has received the results. If you do not hear from us within 7 days, please contact the clinic through [employer]kart[employer] or phone. If you have a critical or abnormal lab result, we will notify you by phone as soon as possible.  Submit refill requests through [employer] or call your pharmacy and they will forward the refill request to us. Please allow 3 business days for your refill to be completed.          Additional Information About Your Visit        MyChart Information     [employer] gives you secure access to your electronic health record. If you see a primary care provider, you can also send messages to your care team and make appointments. If you have questions, please call your primary care clinic.  If you do not have a primary care provider, please call [phone] and they will assist you.        Care EveryWhere ID     This is your Care EveryWhere ID. This could be used by other organizations to access your [place] medical records  [id number]        Your Vitals Were     Last Period                   01/01/2009            Blood Pressure from Last 3 Encounters:   12/01/17 116/73   11/20/17 134/87   11/01/17 110/74    Weight from Last 3 Encounters:   12/01/17 85.7 kg (189 lb)   11/20/17 85.8  kg (189 lb 3.2 oz)   11/01/17 81.4 kg (179 lb 6 oz)              Today, you had the following     No orders found for display         Today's Medication Changes          These changes are accurate as of: 1/18/18  1:26 PM.  If you have any questions, ask your nurse or doctor.               These medicines have changed or have updated prescriptions.        Dose/Directions    metFORMIN 500 MG 24 hr tablet   Commonly known as:  GLUCOPHAGE-XR   This may have changed:    - how much to take  - when to take this  - additional instructions   Used for:  Type 2 diabetes mellitus without complication, without long-term current use of insulin (H)        Take two tabs by mouth once daily with evening meal.   Quantity:  180 tablet   Refills:  PRN                Primary Care Provider Office Phone # Fax #    Morelia JAVIER Ayon [phone] [address] [facility] [address]        Equal Access to Services     BHUPINDER [facility]ULIO : Hadii serena mckeono Soaxel, waaxda luqradu, qaybta kaalmada adewoodyafrancesca, sujatha betts . So [facility] [phone].    ATENCIÓN: Si habla español, tiene a martínez disposición servicios gratuitos de asistencia lingüística. Benjamíname al [phone].    We comply with applicable federal civil rights laws and [place] laws. We do not discriminate on the basis of race, color, national origin, age, disability, sex, sexual orientation, or gender identity.            Thank you!     Thank you for choosing [facility]  for your care. Our goal is always to provide you with excellent care. Hearing back from our patients is one way we can continue to improve our services. Please take a few minutes to complete the written survey that you may receive in the mail after your visit with us. Thank you!             Your Updated Medication List - Protect others around you: Learn how to safely use, store and throw away your medicines at www.disposemymeds.org.          This  list is accurate as of: 1/18/18  1:26 PM.  Always use your most recent med list.                   Brand Name Dispense Instructions for use Diagnosis    atorvastatin 80 MG tablet    LIPITOR    90 tablet    Take 1 tablet (80 mg) by mouth daily    Hyperlipidemia LDL goal <100       blood glucose lancets standard    no brand specified    100 each    Use to test blood sugar 2 times daily or as directed.    Type 2 diabetes mellitus without complication, without long-term current use of insulin (H)       blood glucose monitoring meter device kit    no brand specified    1 kit    Use to test blood sugar 2 times daily or as directed.    Type 2 diabetes mellitus without complication, without long-term current use of insulin (H)       blood glucose monitoring test strip    no brand specified    100 strip    Use to test blood sugars 2 times daily or as directed    Type 2 diabetes mellitus without complication, without long-term current use of insulin (H)       CALCIUM-MAGNESIUM-VITAMIN D PO      Take 2 tablets by mouth daily        EPINEPHrine 0.3 MG/0.3ML injection 2-pack    EPIPEN/ADRENACLICK/or ANY BX GENERIC EQUIV    1 each    Inject 0.3 mLs (0.3 mg) into the muscle once as needed for anaphylaxis    Bee allergy status       escitalopram 20 MG tablet    LEXAPRO    30 tablet    Take 1 tablet (20 mg) by mouth daily    Major depressive disorder, recurrent episode, mild (H)       fluticasone 50 MCG/ACT spray    FLONASE    48 mL    SHAKE WELL AND USE 2 SPRAYS IN EACH NOSTRIL DAILY    Chronic maxillary sinusitis       gabapentin 100 MG capsule    NEURONTIN    42 capsule    Take 1-2 capsules (100-200 mg) by mouth 3 times daily Start the day after chemo for one week    Pain in joint, multiple sites       L-GLUTAMINE PO      Take 2 g by mouth 2 times daily        lisinopril 10 MG tablet    PRINIVIL/ZESTRIL    90 tablet    Take 1 tablet (10 mg) by mouth daily    Essential hypertension       loratadine 10 MG tablet    CLARITIN    30  "tablet    Take 1 tablet (10 mg) by mouth daily    Pain in joint, multiple sites       LORazepam 1 MG tablet    ATIVAN    30 tablet    Take 1 tablet (1 mg) by mouth every 6 hours as needed (nausea/vomiting, anxiety or sleep)    Ovarian cancer, left (H), Encounter for long-term (current) use of medications       metFORMIN 500 MG 24 hr tablet    GLUCOPHAGE-XR    180 tablet    Take two tabs by mouth once daily with evening meal.    Type 2 diabetes mellitus without complication, without long-term current use of insulin (H)       MULTIVITAMIN ADULT PO      Take 1 tablet by mouth daily        order for DME     1 Units    Equipment being ordered: blood pressure monitor    Essential hypertension       * order for DME     1 each    Compression stockings 20-30 mm Hg. To be wear when directed by Hematology team and while awake for the first two years post clot.    Long-term (current) use of anticoagulants, Acute deep vein thrombosis (DVT) of left lower extremity, unspecified vein (H)       * order for DME     1 Bottle    Plain packing strip 1/4\"    Abscess of leg       * order for DME     1 Box    Sterile zoey tipped applicators    Abscess of leg       * order for DME     3 Bottle    USE 1/4 INCH  WIDTH PLAIN NU GAUZE PACKING TO DO SELF WOUND CARE OF LEFT LOWER LEG ONCE DAILY. DIMENSIONS OF WOUND PRESENTLY ARE 2 X 2 INCH AND APPROXIMATELY 1/2 INCH DEEP.  USES 6 INCHES OF PACKING CURRENTLY FOR DRESSING CHANGE.  FAX TO [employer] [phone]    Wound of left leg       * order for DME     30 pad    STERILE ADHESIVE PAD BANDAGE AT LEAST 4X4 INCH IN SIZE NEEDED FOR DAILY WOUND CARE TO LEFT LOWER LEG. FAX TO [employer] [phone]    Leg wound, left       * order for DME     1 Units    Home blood pressure monitor    Type 2 diabetes mellitus without complication, without long-term current use of insulin (H)       PRO-BIOTIC BLEND PO      Take 1 capsule by mouth daily Enzymatic Therapy-probiotic pearls        " prochlorperazine 10 MG tablet    COMPAZINE    30 tablet    Take 1 tablet (10 mg) by mouth every 6 hours as needed (nausea/vomiting)    Ovarian cancer, left (H), Encounter for long-term (current) use of medications       rivaroxaban ANTICOAGULANT 20 MG Tabs tablet    XARELTO    30 tablet    Take 1 tablet (20 mg) by mouth daily (with dinner)    Long-term (current) use of anticoagulants, Acute deep vein thrombosis (DVT) of left lower extremity, unspecified vein (H)       traZODone 50 MG tablet    DESYREL    180 tablet    TAKE 1 TO 2 TABLETS(50  MG) BY MOUTH EVERY NIGHT AS NEEDED FOR SLEEP    Insomnia, unspecified type       VITAMIN B-COMPLEX PO           vitamin D 1000 UNITS capsule      Take 2,000 Units by mouth daily        VYVANSE PO      Take 50 mg by mouth daily        * Notice:  This list has 6 medication(s) that are the same as other medications prescribed for you. Read the directions carefully, and ask your doctor or other care provider to review them with you.

## 2018-01-31 DIAGNOSIS — R30.0 DYSURIA: ICD-10-CM

## 2018-01-31 DIAGNOSIS — R30.0 DYSURIA: Primary | ICD-10-CM

## 2018-01-31 DIAGNOSIS — R19.7 DIARRHEA: Primary | ICD-10-CM

## 2018-01-31 DIAGNOSIS — R19.7 DIARRHEA: ICD-10-CM

## 2018-01-31 LAB
ALBUMIN UR-MCNC: ABNORMAL MG/DL
APPEARANCE UR: CLEAR
BACTERIA #/AREA URNS HPF: ABNORMAL /HPF
BILIRUB UR QL STRIP: NEGATIVE
C DIFF TOX B STL QL: NEGATIVE
COLOR UR AUTO: YELLOW
GLUCOSE UR STRIP-MCNC: NEGATIVE MG/DL
HGB UR QL STRIP: ABNORMAL
KETONES UR STRIP-MCNC: NEGATIVE MG/DL
LEUKOCYTE ESTERASE UR QL STRIP: NEGATIVE
NITRATE UR QL: NEGATIVE
NON-SQ EPI CELLS #/AREA URNS LPF: ABNORMAL /LPF
PH UR STRIP: 6 PH (ref 5–7)
RBC #/AREA URNS AUTO: ABNORMAL /HPF
SOURCE: ABNORMAL
SP GR UR STRIP: 1.01 (ref 1–1.03)
SPECIMEN SOURCE: NORMAL
UROBILINOGEN UR STRIP-ACNC: 0.2 EU/DL (ref 0.2–1)
WBC #/AREA URNS AUTO: ABNORMAL /HPF

## 2018-01-31 PROCEDURE — 87493 C DIFF AMPLIFIED PROBE: CPT | Performed by: UROLOGY

## 2018-01-31 PROCEDURE — 81001 URINALYSIS AUTO W/SCOPE: CPT | Performed by: UROLOGY

## 2018-01-31 PROCEDURE — 87086 URINE CULTURE/COLONY COUNT: CPT | Performed by: UROLOGY

## 2018-02-01 LAB
BACTERIA SPEC CULT: NO GROWTH
SPECIMEN SOURCE: NORMAL

## 2018-02-03 ENCOUNTER — HEALTH MAINTENANCE LETTER (OUTPATIENT)
Age: 63
End: 2018-02-03

## 2018-02-15 DIAGNOSIS — Z08 ENCOUNTER FOR FOLLOW-UP SURVEILLANCE OF OVARIAN CANCER: Primary | ICD-10-CM

## 2018-02-15 DIAGNOSIS — Z85.43 ENCOUNTER FOR FOLLOW-UP SURVEILLANCE OF OVARIAN CANCER: Primary | ICD-10-CM

## 2018-02-26 ENCOUNTER — ONCOLOGY VISIT (OUTPATIENT)
Dept: ONCOLOGY | Facility: CLINIC | Age: 63
End: 2018-02-26
Attending: NURSE PRACTITIONER
Payer: COMMERCIAL

## 2018-02-26 ENCOUNTER — APPOINTMENT (OUTPATIENT)
Dept: LAB | Facility: CLINIC | Age: 63
End: 2018-02-26
Attending: OBSTETRICS & GYNECOLOGY
Payer: COMMERCIAL

## 2018-02-26 VITALS
DIASTOLIC BLOOD PRESSURE: 73 MMHG | BODY MASS INDEX: 32.75 KG/M2 | HEART RATE: 89 BPM | TEMPERATURE: 98.3 F | WEIGHT: 184.8 LBS | OXYGEN SATURATION: 96 % | SYSTOLIC BLOOD PRESSURE: 119 MMHG | RESPIRATION RATE: 18 BRPM

## 2018-02-26 DIAGNOSIS — Z85.43 ENCOUNTER FOR FOLLOW-UP SURVEILLANCE OF OVARIAN CANCER: Primary | ICD-10-CM

## 2018-02-26 DIAGNOSIS — D73.89 NODULE OF SPLEEN: ICD-10-CM

## 2018-02-26 DIAGNOSIS — R91.8 PULMONARY NODULES: ICD-10-CM

## 2018-02-26 DIAGNOSIS — Z08 ENCOUNTER FOR FOLLOW-UP SURVEILLANCE OF OVARIAN CANCER: Primary | ICD-10-CM

## 2018-02-26 DIAGNOSIS — T45.1X5A PERIPHERAL NEUROPATHY DUE TO CHEMOTHERAPY (H): ICD-10-CM

## 2018-02-26 DIAGNOSIS — G62.0 PERIPHERAL NEUROPATHY DUE TO CHEMOTHERAPY (H): ICD-10-CM

## 2018-02-26 LAB — CANCER AG125 SERPL-ACNC: 14 U/ML (ref 0–30)

## 2018-02-26 PROCEDURE — 25000128 H RX IP 250 OP 636: Mod: ZF | Performed by: NURSE PRACTITIONER

## 2018-02-26 PROCEDURE — G0463 HOSPITAL OUTPT CLINIC VISIT: HCPCS | Mod: ZF

## 2018-02-26 PROCEDURE — 36591 DRAW BLOOD OFF VENOUS DEVICE: CPT

## 2018-02-26 PROCEDURE — 86304 IMMUNOASSAY TUMOR CA 125: CPT | Performed by: NURSE PRACTITIONER

## 2018-02-26 PROCEDURE — 99213 OFFICE O/P EST LOW 20 MIN: CPT | Mod: ZP | Performed by: NURSE PRACTITIONER

## 2018-02-26 RX ORDER — HEPARIN SODIUM (PORCINE) LOCK FLUSH IV SOLN 100 UNIT/ML 100 UNIT/ML
5 SOLUTION INTRAVENOUS EVERY 8 HOURS PRN
Status: DISCONTINUED | OUTPATIENT
Start: 2018-02-26 | End: 2018-02-26 | Stop reason: HOSPADM

## 2018-02-26 RX ADMIN — SODIUM CHLORIDE, PRESERVATIVE FREE 5 ML: 5 INJECTION INTRAVENOUS at 15:29

## 2018-02-26 ASSESSMENT — PAIN SCALES - GENERAL: PAINLEVEL: NO PAIN (1)

## 2018-02-26 NOTE — NURSING NOTE
Chief Complaint   Patient presents with     Port Draw     Labs drawn via port by RN. Line flushed and hep locked, then de-accessed. VS taken.     Chapis Peters RN

## 2018-02-26 NOTE — NURSING NOTE
"Oncology Rooming Note    February 26, 2018 3:46 PM   Hafsa Corona is a 62 year old female who presents for:    Chief Complaint   Patient presents with     Port Draw     Labs drawn via port by RN. Line flushed and hep locked, then de-accessed. VS taken.     Oncology Clinic Visit     3 month f/u     Initial Vitals: /73 (BP Location: Right arm, Patient Position: Sitting, Cuff Size: Adult Regular)  Pulse 89  Temp 98.3  F (36.8  C) (Oral)  Resp 18  Wt 83.8 kg (184 lb 12.8 oz)  LMP 01/01/2009  SpO2 96%  BMI 32.75 kg/m2 Estimated body mass index is 32.75 kg/(m^2) as calculated from the following:    Height as of 12/1/17: 1.6 m (5' 2.99\").    Weight as of this encounter: 83.8 kg (184 lb 12.8 oz). Body surface area is 1.93 meters squared.  No Pain (1) Comment: Data Unavailable   Patient's last menstrual period was 01/01/2009.  Allergies reviewed: Yes  Medications reviewed: Yes    Medications: Medication refills not needed today.  Pharmacy name entered into [facility]:    [id number] - [place], [place] - [address] AT [facility] - [place], [place] - [address] [facility] [place], [place] - [address] [facility] [phone]    Clinical concerns: Patient states there are no new concerns to discuss with provider.  Augustina Noriega was not notified.       6 minutes for nursing intake (face to face time)     Rebecca Fiore CMA              "

## 2018-02-26 NOTE — PROGRESS NOTES
Gynecologic Oncology Follow-Up Visit    RE: Hafsa Corona  MRN: [id number]  : 1955  Date of Visit: 2018    CC: Hafsa Corona  is a 62 year old female with a history of stage IC2 clear cell ovarian carcinoma. She completed treatment with surgery and three cycles of chemotherapy on 10/17/17. She presents today for a three month surveillance visit.    HPI: Hafsa comes to the clinic feeling well from a gynecologic perspective. Continues with neuropathy in her feet which improves with acupuncture. Occasional diarrhea the past month, C diff negative. She has had a difficult past few months- had a R nephrectomy at [place] in January and was found to have a rare renal malignancy. Will be following up for surveillance in three months at [place]. States she has been recovering from this slowly but steadily.  Reports significant stress with having her apartment checked by the  as well as concerns about work and insurance. Sees Dr. Vasquez for mental health but has not seen her while recovering from her nephrectomy. She is sexually active without concern, reports she is in a loving relationship. UTD on seeing her PCP and colonoscopy but reports she is due for a breast MRI- being screened every six months with mammogram vs MRI for family history. She sees an ophthalmologist and will be having cataract surgery. She thinks she may have a sebaceous cyst on her back. Reports arthralgias and some dizziness at times. Denies unintended weight loss, fatigue, weakness, changes in vision or hearing, shortness of breath, cough, chest pain, abdominal pain, dyspepsia, nausea, vomiting, constipation, bloating, dysuria, urinary frequency or urgency, hematuria, pelvic pain, lower back pain, vaginal bleeding, vaginal discharge, or swelling of the extremities.     Oncology History:  The patient has had a recent episode of lower abdominal pain in early July.  She was subsequently sent to the emergency room and was found to have a  large 9 cm complex ovarian mass. She was admitted to the hospital for pain control.  7/3/17:  1187  07/07/17: Exploratory laparotomy, total abdominal hysterectomy, left salpingo-oophorectomy, cancer staging including infracolic omentectomy, bilateral pelvic & para-aortic lymphadenectomy, peritoneal biopsies, evacuation of pelvic fluid by Dr. Cole.    FINAL DIAGNOSIS:   A. LEFT OVARY AND FALLOPIAN TUBE, LEFT SALPINGO-OOPHORECTOMY:   - Ovarian clear cell carcinoma   - Background of endometriosis   - Fallopian tube with no significant histologic abnormality.   B. UTERUS, HYSTERECTOMY:   -  Inactive endometrium   -  Myometrium with adenomyosis and leiomyoma.   -  Cervix with squamous metaplasia.   C. PERITONEUM, RIGHT PELVIS, BIOPSY:   - Fibroadipose tissue, negative for malignancy.   D. LYMPH NODES, RIGHT PELVIC, DISSECTION:   - Thirteen benign lymph nodes (0/13).   E. LYMPH NODES, LEFT PELVIC, DISSECTION:   - Seven benign lymph nodes (0/7).   F. PERITONEUM, LEFT PELVIS, BIOPSY:   - Fibroadipose tissue, negative for malignancy.   G. PERITONEUM, BLADDER, BIOPSY:   - Fibroadipose tissue  with acute and chronic inflammation, negative for malignancy.   H. PERITONEUM, POSTERIOR CUL-DE-SAC, BIOPSY:   - Fibroadipose tissue, negative for malignancy.   I. PERITONEUM, LEFT PARACOLIC GUTTER, BIOPSY:   - Fibroadipose tissue, negative for malignancy.   J. LYMPH NODES, LEFT PARA-AORTIC, DISSECTION:   - Five benign lymph nodes (0/5).   K. LYMPH NODES, RIGHT PARA-AORTIC, DISSECTION:   - Two benign lymph nodes (0/2).   L. PERITONEUM, RIGHT PARACOLIC GUTTER, BIOPSY:   - Fibroadipose tissue, negative for malignancy.   M. OMENTUM, OMENTECTOMY:   - Adipose tissue with reactive changes, negative for malignancy.   COMMENT:   The final diagnosis confirms the interpretation provided intraoperatively.   Report Name: Ovary or Fallopian Tube   Status: Submitted   Part(s) Involved:   A: Ovary and fallopian tube, left   Synoptic Report:    CLINICAL   Clinical History:   - Pelvic mass   SPECIMEN   Procedure:   - Left salpingo-oophorectomy   - Hysterectomy   - Omentectomy   - Peritoneal  biopsies   Lymph Node Sampling:   - Performed   Location:   - Pelvic lymph nodes   - Para-aortic lymph nodes   Specimen Integrity:   - Left ovary   Specimen Integrity of Left Ovary:   - Capsule intact   TUMOR   Primary Tumor Site(s):   - Left ovary   Left Ovary   Tumor Size of Left Ovary: 19 cm   Histologic Type:   - Clear cell carcinoma   Tumor Extent   Ovarian Surface Involvement:   - Absent   Specimen(s)   Extent of Left Ovary:   - Involved   Extent of Left Fallopian Tube:   - Not involved   Extent of Omentum:   - Not involved   Extent of Uterus:   - Not involved   Extent of Peritoneum:   - Not involved   Peritoneal Ascitic Fluid:   - Not performed / unknown   Pleural Fluid:   - Not performed / unknown   LYMPH NODES     Number of Pelvic Lymph Nodes Examined: 20     Number of Pelvic Lymph Nodes Involved: None identified     Number of Para-aortic Lymph Nodes Examined: 7     Number of Para-Aortic Lymph Nodes Involved: None identified   STAGE (PTNM, AJCC 7TH ED.)   Primary Tumor (pT):   - Ovary   Ovarian Primary Tumor (pT):   - pT1a: Tumor limited to 1 ovary; capsule intact, no tumor on ovarian surface. No malignant cells in ascites or peritoneal washings#   Regional Lymph Nodes (pN):   - pN0: No regional lymph node metastasis       07/31/17: Dr Shaffer follow up: Discussed with the patient that given the high risk histology, as well as ruptured mass before surgery, she would be qualified at higher risk of recurrence despite early stage ovarian cancer.  Recommended adjuvant chemotherapy. Plan for carboplatin of AUC of 6 and paclitaxel of 175, m2 for 3 cycles. Referral for genetic counselor and will see her back after those 3 cycles  08/03/17: Call from patient stating she is going for a second opinion and would like chemotherapy rescheduled to week of  8/14/17.      08/16/17: Cycle #1 Carbo/Taxol.  73. Significant paclitaxel reaction.  08/30/17: C1 carboplatin/inpatient paclitaxel desensitization.   09/25/17: C2 carboplatin/paclitaxel- inpatient paclitaxel desensitization.  15.  10/16/17: C3 carboplatin/paclitaxel- switched to docetaxel given her neuropathy.  10.  11/14/17:  8. CT CAP:  IMPRESSION:   1. Post surgical changes of hysterectomy and bilateral  salpingo-oophorectomy. Nodular soft tissue density in the posterior  cul-de-sac along with ill-defined nodular thickening in the left  pelvis, suspicious for residual disease or metastatic deposits.   2. There is a 3 cm mass in the superior pole of the right kidney,  possibly extending into the renal hilum. Represents RCC until proven  otherwise. Consider renal mass protocol CT to assess renal vein  involvement.  3. Stable sub-4 mm pulmonary nodules, continued attention on  follow-up.    11/16/17: CT renal mass protocol  IMPRESSION:  1. Arterially enhancing mass measuring 3.6 x 2.1 x 2.2 cm mass arising  from superior posterior of the right kidney, this is renal cell  carcinoma until proven otherwise.  2. Stable fat-containing umbilical hernia.    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 Margins negative. Three month surveillance plan with [place].     2/26/18:  pending     Past Medical History:   Diagnosis Date     Anxiety state, unspecified     [id number]     Attention deficit disorder without mention of hyperactivity      Chronic rhinitis      Depressive disorder, not elsewhere classified      Panic disorder without agoraphobia      Pure hypercholesterolemia     Lipitor     Tachycardia, unspecified     9/1999-2/2004     Type II or unspecified type diabetes mellitus without mention of complication, not stated as uncontrolled     diagnosed 2004       Past Surgical History:   Procedure Laterality Date     HYSTERECTOMY TOTAL ABDOMINAL, BILATERAL  SALPINGO-OOPHORECTOMY, NODE DISSECTION, COMBINED Left 7/7/2017    Procedure: COMBINED HYSTERECTOMY TOTAL ABDOMINAL, SALPINGO-OOPHORECTOMY, NODE DISSECTION;  Exploratory Laparotomy, Left Salpingo-Oophorectomy, Cancer Staging, Total Hysterectomy, Omentectomy, Evacuation of abdominal fluid, Lymph Node Dissection  Anesthesia Block ;  Surgeon: Serena Cole MD;  Location: [facility] OR     HYSTERECTOMY, PAP NO LONGER INDICATED  07/07/2017    Laparotomy; for ovarian cancer staging     INSERT PORT VASCULAR ACCESS Right 8/21/2017    Procedure: INSERT PORT VASCULAR ACCESS;  Single Lumen Chest Power Port;  Surgeon: Stephen Mike PA-C;  Location: [facility] OR     LYMPHADENECTOMY RETROPERITONEAL Bilateral 07/07/2017    Laparotomy; pelvics & para-aortics; for ovarian cancer staging     OMENTECTOMY  07/07/2017    Laparotomy; for ovarian cancer staging     SALPINGO OOPHORECTOMY,R/L/KAYY Right 2008    Salpingo Oophorectomy, RT     SALPINGO OOPHORECTOMY,R/L/KAYY Left 07/07/2017    Laparotomy; for ovarian cancer staging     SURGICAL HISTORY OF -       facial surgery d/t fall in 1/2002     SURGICAL HISTORY OF -       1985 removal of breast cyst     SURGICAL HISTORY OF -   2008    endometrial ablation       Social History     Social History     Marital status: Single     Spouse name: N/A     Number of children: N/A     Years of education: N/A     Occupational History     Not on file.     Social History Main Topics     Smoking status: Never Smoker     Smokeless tobacco: Never Used     Alcohol use Yes      Comment: rarely     Drug use: No     Sexual activity: Yes     Birth control/ protection: None     Other Topics Concern     Not on file     Social History Narrative       Family History   Problem Relation Age of Onset     C.A.D. Father      DIABETES Father      Hypertension Father      CEREBROVASCULAR DISEASE Father      Psychotic Disorder Father      Pancreatic Cancer Father      C.A.D. Mother      Hypertension Mother      Breast  Cancer Mother      Psychotic Disorder Mother      Colon Cancer Mother      CANCER Mother      Bone cancer     Prostate Problems Brother      cleared      Psychotic Disorder Sister      x2     Neurologic Disorder Sister      Psychotic Disorder Brother      x2     Depression Brother      major depressive disorder and OCD     Anxiety Disorder Brother      MENTAL ILLNESS Brother      Psychotic Disorder Maternal Grandmother      ?     Psychotic Disorder Maternal Grandfather      ?     Psychotic Disorder Paternal Grandmother      Schizophernia     Psychotic Disorder Paternal Grandfather      ?     Respiratory Paternal Grandfather       of emphysema and smoking     Psychotic Disorder Sister      Other - See Comments Sister      small kidney stone      Cancer - colorectal No family hx of        Current Outpatient Prescriptions   Medication     B Complex Vitamins (VITAMIN B-COMPLEX PO)     blood glucose (NO BRAND SPECIFIED) lancets standard     blood glucose monitoring (NO BRAND SPECIFIED) meter device kit     blood glucose monitoring (NO BRAND SPECIFIED) test strip     order for DME     L-GLUTAMINE PO     gabapentin (NEURONTIN) 100 MG capsule     fluticasone (FLONASE) 50 MCG/ACT spray     LORazepam (ATIVAN) 1 MG tablet     loratadine (CLARITIN) 10 MG tablet     order for DME     order for DME     prochlorperazine (COMPAZINE) 10 MG tablet     rivaroxaban ANTICOAGULANT (XARELTO) 20 MG TABS tablet     traZODone (DESYREL) 50 MG tablet     order for DME     order for DME     Multiple Vitamins-Minerals (MULTIVITAMIN ADULT PO)     Lisdexamfetamine Dimesylate (VYVANSE PO)     order for DME     CALCIUM-MAGNESIUM-VITAMIN D PO     Probiotic Product (PRO-BIOTIC BLEND PO)     atorvastatin (LIPITOR) 80 MG tablet     order for DME     lisinopril (PRINIVIL/ZESTRIL) 10 MG tablet     metFORMIN (GLUCOPHAGE-XR) 500 MG 24 hr tablet     escitalopram (LEXAPRO) 20 MG tablet     EPINEPHrine (EPIPEN) 0.3 MG/0.3ML injection     Cholecalciferol  (VITAMIN D) 1000 UNIT capsule     No current facility-administered medications for this visit.           Allergies   Allergen Reactions     Wasps [Hornets] Anaphylaxis     No Clinical Screening - See Comments      Taxol      Paclitaxel Difficulty breathing     Sulfa Drugs Hives       Review of Systems  General: Denies fatigue, changes in weight, weakness, appetite changes, night sweats, hot flashes, fever, chills, or difficulty sleeping  HEENT: + visual difficulty. Denies headaches, hair loss, spots or floaters, diplopia, masses, head injury, tinnitus, hearing loss, epistaxis, congestion, problems with teeth or gums, dysphonia, or dysphagia  Pulmonary: Denies cough, sputum, hemoptysis, shortness of breath, dyspnea on exertion, wheezing, or allergies  Cardiovascular: Denies chest pain, fainting, palpitations, murmurs, activity intolerance, swelling in legs, or high blood pressure  Gastrointestinal: + diarrhea. Denies nausea, vomiting, constipation, abdominal pain, bloating, heartburn, melena, hematochezia, or jaundice  Genitourinary: Denies dysuria, urinary urgency or frequency, hematuria, cloudy or malodorous urine, incontinence, repeat urinary tract infections, flank pain, pelvic pain, vaginal bleeding, vaginal discharge, or vaginal dryness  Sexual Function: Denies pain with intercourse, changes in libido, arousal difficulty, or changes in orgasm  Integumentary: Denies rashes, sores, changing moles, or scarring  Hematologic: Denies swollen lymph nodes, masses, easy bruising, or easy bleeding  Musculoskeletal: + arthralgias. Denies falls, back pain, myalgias, stiffness, muscle weakness or muscle cramps  Neurologic: + numbness and tingling, dizziness. Denies changes in memory, difficulty with walking, seizures, or tremors  Psychiatric: + mood changes. Denies anxiety, depression, suicidal thoughts, or difficulty concentrating  Endocrine: Denies polydipsia, polyuria, temperature intolerance, or history of thyroid  disease        OBJECTIVE:    Physical Exam:    /73 (BP Location: Right arm, Patient Position: Sitting, Cuff Size: Adult Regular)  Pulse 89  Temp 98.3  F (36.8  C) (Oral)  Resp 18  Wt 83.8 kg (184 lb 12.8 oz)  LMP 01/01/2009  SpO2 96%  BMI 32.75 kg/m2    CONSTITUTIONAL: Alert non-toxic appearing female in no acute distress  HEAD: Normocephalic, atraumatic  EYES: PERRLA; no scleral icterus  ENT: Oropharynx pink without lesions  NECK: Neck supple without lymphadenopathy  RESPIRATORY: Lungs clear to auscultation, no increased work of breathing noted  CV: Regular rate and rhythm, S1S2, no clicks, murmurs, rubs, or gallops; bilateral lower extremities without edema, dorsalis pedis pulses 2+ bilaterally  GASTROINTESTINAL: Normoactive bowel sounds x4 quadrants, abdomen soft, non-distended, and non-tender to palpation without masses or organomegaly  GENITOURINARY: External genitalia and urethral meatus pink without lesions, masses, or excoriation. Vagina pink and moist without masses or lesions. Vaginal cuff without nodularity, masses, or lesions. Cervix surgically absent. Bimanual exam reveals no masses or fullness. Rectovaginal exam confirms these findings.  LYMPHATIC: Cervical, supraclavicular, and inguinal nodes without lymphadenopathy  MUSCULOSKELETAL: Moves all extremities, no obvious muscle wasting  NEUROLOGIC: No gross deficits, normal gait  SKIN: Appropriate color for race, warm and dry, no rashes or lesions to unclothed skin; flesh colored mobile cystic subcutaneous lesion <1cm in diameter to L lower back  PSYCHIATRIC: Pleasant and interactive, affect bright, makes appropriate eye contact, thought process linear      Data:   pending    Assessment/Plan:  1) Stage IC2 clear cell ovarian carcinoma: No signs of recurrence. Continue surveillance every three months x2 years (through 10/2019) followed by every six months x3 years (through 10/2022) then annually thereafter with  and pelvic/rectal  exam. Reviewed signs and symptoms  of recurrence including but not limited to bleeding from vagina, bladder, or rectum, bloating, early satiety, swelling in the lower extremities, abdominal or lower back pain, changes in bowel or bladder patterns, shortness of breath, increased fatigue, unexplained weight loss, and night sweats. Reviewed signs and symptoms for when she should contact the clinic or seek additional care. Patient to contact the clinic with any questions or concerns in the interim.  2) Pulmonary nodules: Follow up CT in three months. Will obtain CT CAP to also assess for soft tissue nodule near splenic hilum and pancreatic tail.  3) Neuropathy: Stable. Continue to follow up with acupuncture.  4) Mental health: She seems to be dealing with a fair amount of stress right now. Encouraged her to set up an appointment with Dr. Vasquez again as this has been helpful in the past.  5) Genetic testing: Hafsa is negative for mutations in the AIP, ALK, APC, COSTA, BAP1, BARD1, BLM, BRCA1, BRCA2, BRIP1, BMPR1A, CDH1, CDK4, CDKN1B, CDKN2A, CHEK2, DICER1, EPCAM, FANCC, FH, FLCN, GALNT12, GREM1, HOXB13, MAX, MEN1, MET, MITF, MLH1, MRE11A, MSH2, MSH6, MUTYH, NBN, NF1, NF2, PALB2, PHOX2B, PMS2, POLD1, POLE, POT1, HSAWZ9G, PTCH1, PTEN, RAD50, RAD51C, RAD51D, RB1, RET, SDHA, SDHAF2, SDHB, SDHC, SDHD, SMAD4, SMARCA4, SMARCB1, SMARCE1, STK11, SUFU, BLIE086, TP53, TSC1, TSC2, VHL, and XRCC2 genes. No mutations were found in any of the 67 genes analyzed.   6) Labs:   7) Health maintenance issues discussed today include to follow up with PCP for co-morbid conditions and non-gynecologic issues. To follow up with PCP for concerns regarding sebaceous cyst and breast MRI.  8) Patient verbalized understanding of and agreement with plan.    A total of 20 minutes of face to face time spent with patient, over 50% of which was spent in counseling and coordination of care.    MARLON Ogden, FNP-C  Division of Gynecologic  Oncology  [facility]  Pager: [phone]

## 2018-02-26 NOTE — MR AVS SNAPSHOT
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)        Pulmonary nodules        Nodule of spleen           Follow-ups after your visit        Your next 10 appointments already scheduled     Mar 21, 2018  2:30 PM CDT   RETURN GENERAL with Mary Jo Massey MD   Eye Clinic ([facility])    [address]  [address] [address]  [facility] [address]   [phone]            Mar 22, 2018 12:00 PM CDT   (Arrive by 11:45 AM)   RETURN WITH ROOM with Lola Vasquez PhD LP,  2 114 [facility]ULT [facility] ([facility])    [address] [place] [address]  [address]  [facility] [address]   [phone]            Mar 22, 2018  1:15 PM CDT   [facility] Draw with  [facility] DRAW   [facility] Draw ([facility])    [address] [place] [address]  [address]  [facility] [address]   [phone]            Apr 05, 2018 12:00 PM CDT   (Arrive by 11:45 AM)   RETURN WITH ROOM with Lola Vasquez PhD LP,  2 114 [facility]ULT [facility] ([facility])    [address]  [address]  [facility] [address]   [phone]            Apr 19, 2018 12:00 PM CDT   (Arrive by 11:45 AM)   RETURN WITH ROOM with Lola Vasquez PhD LP,  2 114 [facility]ULT [facility] ([facility])    [address] [place] [address]  [address]  [facility] [address]   [phone]            May 22, 2018 11:15 AM CDT   [facility] Draw with [facility] DRAW   [facility] Draw ([facility])    [address]  [address]  [facility] [address] [phone]            May 22, 2018 11:40 AM CDT   (Arrive by 11:25 AM)   CT CHEST/ABDOMEN/PELVIS W CONTRAST with UCCT2   [facility] CT ([facility])    [address]  1st Floor  [facility] [address] [phone]           Please bring any scans or X-rays taken at other hospitals, if similar tests were done. Also bring a list of your medicines, including vitamins, minerals and over-the-counter drugs. It is safest to leave personal items at home.  Be sure to tell your doctor:   If you have any allergies.   If there s any chance you are pregnant.   If you are breastfeeding.  You may have contrast for this exam. To prepare:   Do not eat or drink for 2 hours before your exam. If you need to take medicine, you may take it with small sips of water. (We may ask you to take liquid medicine as well.)   The day before your exam, drink extra fluids at least six 8-ounce glasses (unless your doctor tells you to restrict your fluids).   You will be given instructions on how to drink the contrast.  Patients over 70 or patients with diabetes or kidney problems:   If you haven t had a blood test (creatinine test) within the last 30 days, the Cardiologist/Radiologist may require you to get this test prior to your exam.  If you have diabetes:   Continue to take your metformin medication on the day of your exam  Please wear loose clothing, such as a sweat suit or jogging clothes. Avoid snaps, zippers and other metal. We may ask you to undress and put on a hospital gown.  If you have any questions, please call the Imaging Department where you will have your exam.              Future tests that were ordered for you today     Open Future Orders        Priority Expected Expires Ordered    MR Breast Bilateral w/o & w Contrast Routine  3/17/2019 3/16/2018    MA Screen Bilateral w/Micheal Routine 9/18/2018  3/17/2019 3/16/2018            Who to contact     If you have questions or need follow up information about today's clinic visit or your schedule please contact [facility] directly at [phone].  Normal or non-critical lab and imaging results will be communicated to you by Connequityhart, letter or phone within 4 business days after the clinic has received the results. If you do not hear from us within 7 days, please contact the clinic through Connequityhart or phone. If you have a critical or abnormal lab result, we will notify you by phone as soon as possible.  Submit refill requests through [employer] or call your pharmacy and they will forward the refill request to us. Please allow 3 business days for your refill to be completed.          Additional Information About Your Visit        [employer]quity[employer] Information     City[employer]ds gives you secure access to your electronic health record. If you see a primary care provider, you can also send messages to your care team and make appointments. If you have questions, please call your primary care clinic.  If you do not have a primary care provider, please call [phone] and they will assist you.        Care EveryWhere ID     This is your Care EveryWhere ID. This could be used by other organizations to access your [facility] medical records  [id number]        Your Vitals Were     Pulse Temperature Respirations Last Period Pulse Oximetry BMI (Body Mass Index)    89 98.3  F (36.8  C) (Oral) 18 01/01/2009 96% 32.75 kg/m2       Blood Pressure from Last 3 Encounters:   03/16/18 104/70   03/12/18 92/70   03/02/18 (!) 88/59    Weight from Last 3 Encounters:   03/16/18 86.1 kg (189 lb 12.8 oz)   02/26/18 83.8 kg (184 lb 12.8 oz)   12/01/17 85.7 kg (189 lb)              We Performed the Following               Today's Medication Changes          These changes are accurate as of 2/26/18 11:59 PM.  If you have any questions, ask your nurse or doctor.               These  medicines have changed or have updated prescriptions.        Dose/Directions    metFORMIN 500 MG 24 hr tablet   Commonly known as:  GLUCOPHAGE-XR   This may have changed:    - how much to take  - when to take this  - additional instructions   Used for:  Type 2 diabetes mellitus without complication, without long-term current use of insulin (H)        Take two tabs by mouth once daily with evening meal.   Quantity:  180 tablet   Refills:  PRN         Stop taking these medicines if you haven't already. Please contact your care team if you have questions.     gabapentin 100 MG capsule   Commonly known as:  NEURONTIN   Stopped by:  Augustina Noriega, MARLON CNP                    Primary Care Provider Office Phone # Fax #    Morelia HERRERA Nany, [phone] [address] [address]        Equal Access to Services     RONEY ARANDA : Ward tellez Soaxel, waaxda luqadaha, qaybta kaalmada adeakanksha, sujatha betts . So [facility] [phone].    ATENCIÓN: Si habla español, tiene a martínez disposición servicios gratuitos de asistencia lingüística. [facility] [phone].    We comply with applicable federal civil rights laws and [place] laws. We do not discriminate on the basis of race, color, national origin, age, disability, sex, sexual orientation, or gender identity.            Thank you!     Thank you for choosing [facility]  for your care. Our goal is always to provide you with excellent care. Hearing back from our patients is one way we can continue to improve our services. Please take a few minutes to complete the written survey that you may receive in the mail after your visit with us. Thank you!             Your Updated Medication List - Protect others around you: Learn how to safely use, store and throw away your medicines at www.disposemymeds.org.          This list is accurate as of 2/26/18 11:59 PM.  Always use your most recent med  list.                   Brand Name Dispense Instructions for use Diagnosis    atorvastatin 80 MG tablet    LIPITOR    90 tablet    Take 1 tablet (80 mg) by mouth daily    Hyperlipidemia LDL goal <100       blood glucose lancets standard    no brand specified    100 each    Use to test blood sugar 2 times daily or as directed.    Type 2 diabetes mellitus without complication, without long-term current use of insulin (H)       blood glucose monitoring meter device kit    no brand specified    1 kit    Use to test blood sugar 2 times daily or as directed.    Type 2 diabetes mellitus without complication, without long-term current use of insulin (H)       blood glucose monitoring test strip    no brand specified    100 strip    Use to test blood sugars 2 times daily or as directed    Type 2 diabetes mellitus without complication, without long-term current use of insulin (H)       CALCIUM-MAGNESIUM-VITAMIN D PO      Take 2 tablets by mouth daily        EPINEPHrine 0.3 MG/0.3ML injection 2-pack    EPIPEN/ADRENACLICK/or ANY BX GENERIC EQUIV    1 each    Inject 0.3 mLs (0.3 mg) into the muscle once as needed for anaphylaxis    Bee allergy status       escitalopram 20 MG tablet    LEXAPRO    30 tablet    Take 1 tablet (20 mg) by mouth daily    Major depressive disorder, recurrent episode, mild (H)       fluticasone 50 MCG/ACT spray    FLONASE    48 mL    SHAKE WELL AND USE 2 SPRAYS IN EACH NOSTRIL DAILY    Chronic maxillary sinusitis       lisinopril 10 MG tablet    PRINIVIL/ZESTRIL    90 tablet    Take 1 tablet (10 mg) by mouth daily    Essential hypertension       loratadine 10 MG tablet    CLARITIN    30 tablet    Take 1 tablet (10 mg) by mouth daily    Pain in joint, multiple sites       LORazepam 1 MG tablet    ATIVAN    30 tablet    Take 1 tablet (1 mg) by mouth every 6 hours as needed (nausea/vomiting, anxiety or sleep)    Ovarian cancer, left (H), Encounter for long-term (current) use of medications       metFORMIN  "500 MG 24 hr tablet    GLUCOPHAGE-XR    180 tablet    Take two tabs by mouth once daily with evening meal.    Type 2 diabetes mellitus without complication, without long-term current use of insulin (H)       MULTIVITAMIN ADULT PO      Take 1 tablet by mouth daily        order for DME     1 Units    Equipment being ordered: blood pressure monitor    Essential hypertension       * order for DME     1 each    Compression stockings 20-30 mm Hg. To be wear when directed by Hematology team and while awake for the first two years post clot.    Long-term (current) use of anticoagulants, Acute deep vein thrombosis (DVT) of left lower extremity, unspecified vein (H)       * order for DME     1 Bottle    Plain packing strip 1/4\"    Abscess of leg       * order for DME     1 Box    Sterile zoey tipped applicators    Abscess of leg       * order for DME     3 Bottle    USE 1/4 INCH  WIDTH PLAIN NU GAUZE PACKING TO DO SELF WOUND CARE OF LEFT LOWER LEG ONCE DAILY. DIMENSIONS OF WOUND PRESENTLY ARE 2 X 2 INCH AND APPROXIMATELY 1/2 INCH DEEP.  USES 6 INCHES OF PACKING CURRENTLY FOR DRESSING CHANGE.  FAX TO [facility] [phone]    Wound of left leg       * order for DME     30 pad    STERILE ADHESIVE PAD BANDAGE AT LEAST 4X4 INCH IN SIZE NEEDED FOR DAILY WOUND CARE TO LEFT LOWER LEG. FAX TO [facility] [phone]    Leg wound, left       * order for DME     1 Units    Home blood pressure monitor    Type 2 diabetes mellitus without complication, without long-term current use of insulin (H)       PRO-BIOTIC BLEND PO      Take 1 capsule by mouth daily Enzymatic Therapy-probiotic pearls        rivaroxaban ANTICOAGULANT 20 MG Tabs tablet    XARELTO    30 tablet    Take 1 tablet (20 mg) by mouth daily (with dinner)    Long-term (current) use of anticoagulants, Acute deep vein thrombosis (DVT) of left lower extremity, unspecified vein (H)       traZODone 50 MG tablet    DESYREL    180 tablet    TAKE 1 TO 2 TABLETS(50  " MG) BY MOUTH EVERY NIGHT AS NEEDED FOR SLEEP    Insomnia, unspecified type       VITAMIN B-COMPLEX PO           vitamin D 1000 UNITS capsule      Take 2,000 Units by mouth daily        * Notice:  This list has 6 medication(s) that are the same as other medications prescribed for you. Read the directions carefully, and ask your doctor or other care provider to review them with you.

## 2018-02-26 NOTE — LETTER
2018       RE: Hafsa Corona  [address]  [place] [address]     Dear Colleague,    Thank you for referring your patient, Hafsa Corona, to the [facility]. Please see a copy of my visit note below.    Gynecologic Oncology Follow-Up Visit    RE: Hafsa Corona  MRN: [id number]  : 1955  Date of Visit: 2018    CC: Hafsa Corona  is a 62 year old female with a history of stage IC2 clear cell ovarian carcinoma. She completed treatment with surgery and three cycles of chemotherapy on 10/17/17. She presents today for a three month surveillance visit.    HPI: Hafsa comes to the clinic feeling well from a gynecologic perspective. Continues with neuropathy in her feet which improves with acupuncture. Occasional diarrhea the past month, C diff negative. She has had a difficult past few months- had a R nephrectomy at [facility] in January and was found to have a rare renal malignancy. Will be following up for surveillance in three months at [facility]. States she has been recovering from this slowly but steadily.  Reports significant stress with having her apartment checked by the fire marshal as well as concerns about work and insurance. Sees Dr. Vasquez for mental health but has not seen her while recovering from her nephrectomy. She is sexually active without concern, reports she is in a loving relationship. UTD on seeing her PCP and colonoscopy but reports she is due for a breast MRI- being screened every six months with mammogram vs MRI for family history. She sees an ophthalmologist and will be having cataract surgery. She thinks she may have a sebaceous cyst on her back. Reports arthralgias and some dizziness at times. Denies unintended weight loss, fatigue, weakness, changes in vision or hearing, shortness of breath, cough, chest pain, abdominal pain, dyspepsia, nausea, vomiting, constipation, bloating, dysuria, urinary frequency or urgency, hematuria, pelvic pain, lower back pain,  vaginal bleeding, vaginal discharge, or swelling of the extremities.     Oncology History:  The patient has had a recent episode of lower abdominal pain in early July.  She was subsequently sent to the emergency room and was found to have a large 9 cm complex ovarian mass. She was admitted to the hospital for pain control.  7/3/17:  1187  07/07/17: Exploratory laparotomy, total abdominal hysterectomy, left salpingo-oophorectomy, cancer staging including infracolic omentectomy, bilateral pelvic & para-aortic lymphadenectomy, peritoneal biopsies, evacuation of pelvic fluid by Dr. Cole.    FINAL DIAGNOSIS:   A. LEFT OVARY AND FALLOPIAN TUBE, LEFT SALPINGO-OOPHORECTOMY:   - Ovarian clear cell carcinoma   - Background of endometriosis   - Fallopian tube with no significant histologic abnormality.   B. UTERUS, HYSTERECTOMY:   -  Inactive endometrium   -  Myometrium with adenomyosis and leiomyoma.   -  Cervix with squamous metaplasia.   C. PERITONEUM, RIGHT PELVIS, BIOPSY:   - Fibroadipose tissue, negative for malignancy.   D. LYMPH NODES, RIGHT PELVIC, DISSECTION:   - Thirteen benign lymph nodes (0/13).   E. LYMPH NODES, LEFT PELVIC, DISSECTION:   - Seven benign lymph nodes (0/7).   F. PERITONEUM, LEFT PELVIS, BIOPSY:   - Fibroadipose tissue, negative for malignancy.   G. PERITONEUM, BLADDER, BIOPSY:   - Fibroadipose tissue  with acute and chronic inflammation, negative for malignancy.   H. PERITONEUM, POSTERIOR CUL-DE-SAC, BIOPSY:   - Fibroadipose tissue, negative for malignancy.   I. PERITONEUM, LEFT PARACOLIC GUTTER, BIOPSY:   - Fibroadipose tissue, negative for malignancy.   J. LYMPH NODES, LEFT PARA-AORTIC, DISSECTION:   - Five benign lymph nodes (0/5).   K. LYMPH NODES, RIGHT PARA-AORTIC, DISSECTION:   - Two benign lymph nodes (0/2).   L. PERITONEUM, RIGHT PARACOLIC GUTTER, BIOPSY:   - Fibroadipose tissue, negative for malignancy.   M. OMENTUM, OMENTECTOMY:   - Adipose tissue with reactive changes, negative  for malignancy.   COMMENT:   The final diagnosis confirms the interpretation provided intraoperatively.   Report Name: Ovary or Fallopian Tube   Status: Submitted   Part(s) Involved:   A: Ovary and fallopian tube, left   Synoptic Report:   CLINICAL   Clinical History:   - Pelvic mass   SPECIMEN   Procedure:   - Left salpingo-oophorectomy   - Hysterectomy   - Omentectomy   - Peritoneal  biopsies   Lymph Node Sampling:   - Performed   Location:   - Pelvic lymph nodes   - Para-aortic lymph nodes   Specimen Integrity:   - Left ovary   Specimen Integrity of Left Ovary:   - Capsule intact   TUMOR   Primary Tumor Site(s):   - Left ovary   Left Ovary   Tumor Size of Left Ovary: 19 cm   Histologic Type:   - Clear cell carcinoma   Tumor Extent   Ovarian Surface Involvement:   - Absent   Specimen(s)   Extent of Left Ovary:   - Involved   Extent of Left Fallopian Tube:   - Not involved   Extent of Omentum:   - Not involved   Extent of Uterus:   - Not involved   Extent of Peritoneum:   - Not involved   Peritoneal Ascitic Fluid:   - Not performed / unknown   Pleural Fluid:   - Not performed / unknown   LYMPH NODES     Number of Pelvic Lymph Nodes Examined: 20     Number of Pelvic Lymph Nodes Involved: None identified     Number of Para-aortic Lymph Nodes Examined: 7     Number of Para-Aortic Lymph Nodes Involved: None identified   STAGE (PTNM, AJCC 7TH ED.)   Primary Tumor (pT):   - Ovary   Ovarian Primary Tumor (pT):   - pT1a: Tumor limited to 1 ovary; capsule intact, no tumor on ovarian surface. No malignant cells in ascites or peritoneal washings#   Regional Lymph Nodes (pN):   - pN0: No regional lymph node metastasis       07/31/17: Dr Shaffer follow up: Discussed with the patient that given the high risk histology, as well as ruptured mass before surgery, she would be qualified at higher risk of recurrence despite early stage ovarian cancer.  Recommended adjuvant chemotherapy. Plan for carboplatin of AUC of 6 and  paclitaxel of 175, m2 for 3 cycles. Referral for genetic counselor and will see her back after those 3 cycles  08/03/17: Call from patient stating she is going for a second opinion and would like chemotherapy rescheduled to week of 8/14/17.      08/16/17: Cycle #1 Carbo/Taxol.  73. Significant paclitaxel reaction.  08/30/17: C1 carboplatin/inpatient paclitaxel desensitization.   09/25/17: C2 carboplatin/paclitaxel- inpatient paclitaxel desensitization.  15.  10/16/17: C3 carboplatin/paclitaxel- switched to docetaxel given her neuropathy.  10.  11/14/17:  8. CT CAP:  IMPRESSION:   1. Post surgical changes of hysterectomy and bilateral  salpingo-oophorectomy. Nodular soft tissue density in the posterior  cul-de-sac along with ill-defined nodular thickening in the left  pelvis, suspicious for residual disease or metastatic deposits.   2. There is a 3 cm mass in the superior pole of the right kidney,  possibly extending into the renal hilum. Represents RCC until proven  otherwise. Consider renal mass protocol CT to assess renal vein  involvement.  3. Stable sub-4 mm pulmonary nodules, continued attention on  follow-up.    11/16/17: CT renal mass protocol  IMPRESSION:  1. Arterially enhancing mass measuring 3.6 x 2.1 x 2.2 cm mass arising  from superior posterior of the right kidney, this is renal cell  carcinoma until proven otherwise.  2. Stable fat-containing umbilical hernia.    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 Margins negative. Three month surveillance plan with [facility].     2/26/18:  pending     Past Medical History:   Diagnosis Date     Anxiety state, unspecified     [id number]     Attention deficit disorder without mention of hyperactivity      Chronic rhinitis      Depressive disorder, not elsewhere classified      Panic disorder without agoraphobia      Pure hypercholesterolemia     Lipitor     Tachycardia, unspecified     9/1999-2/2004      Type II or unspecified type diabetes mellitus without mention of complication, not stated as uncontrolled     diagnosed 2004       Past Surgical History:   Procedure Laterality Date     HYSTERECTOMY TOTAL ABDOMINAL, BILATERAL SALPINGO-OOPHORECTOMY, NODE DISSECTION, COMBINED Left 7/7/2017    Procedure: COMBINED HYSTERECTOMY TOTAL ABDOMINAL, SALPINGO-OOPHORECTOMY, NODE DISSECTION;  Exploratory Laparotomy, Left Salpingo-Oophorectomy, Cancer Staging, Total Hysterectomy, Omentectomy, Evacuation of abdominal fluid, Lymph Node Dissection  Anesthesia Block ;  Surgeon: Serena Cole MD;  Location: [facility] OR     HYSTERECTOMY, PAP NO LONGER INDICATED  07/07/2017    Laparotomy; for ovarian cancer staging     INSERT PORT VASCULAR ACCESS Right 8/21/2017    Procedure: INSERT PORT VASCULAR ACCESS;  Single Lumen Chest Power Port;  Surgeon: Stephen Mike PA-C;  Location: [facility] OR     LYMPHADENECTOMY RETROPERITONEAL Bilateral 07/07/2017    Laparotomy; pelvics & para-aortics; for ovarian cancer staging     OMENTECTOMY  07/07/2017    Laparotomy; for ovarian cancer staging     SALPINGO OOPHORECTOMY,R/L/KAYY Right 2008    Salpingo Oophorectomy, RT     SALPINGO OOPHORECTOMY,R/L/KAYY Left 07/07/2017    Laparotomy; for ovarian cancer staging     SURGICAL HISTORY OF -       facial surgery d/t fall in 1/2002     SURGICAL HISTORY OF -       1985 removal of breast cyst     SURGICAL HISTORY OF -   2008    endometrial ablation       Social History     Social History     Marital status: Single     Spouse name: N/A     Number of children: N/A     Years of education: N/A     Occupational History     Not on file.     Social History Main Topics     Smoking status: Never Smoker     Smokeless tobacco: Never Used     Alcohol use Yes      Comment: rarely     Drug use: No     Sexual activity: Yes     Birth control/ protection: None     Other Topics Concern     Not on file     Social History Narrative       Family History   Problem Relation Age of  Onset     C.A.D. Father      DIABETES Father      Hypertension Father      CEREBROVASCULAR DISEASE Father      Psychotic Disorder Father      Pancreatic Cancer Father      C.A.D. Mother      Hypertension Mother      Breast Cancer Mother      Psychotic Disorder Mother      Colon Cancer Mother      CANCER Mother      Bone cancer     Prostate Problems Brother      cleared      Psychotic Disorder Sister      x2     Neurologic Disorder Sister      Psychotic Disorder Brother      x2     Depression Brother      major depressive disorder and OCD     Anxiety Disorder Brother      MENTAL ILLNESS Brother      Psychotic Disorder Maternal Grandmother      ?     Psychotic Disorder Maternal Grandfather      ?     Psychotic Disorder Paternal Grandmother      Schizophernia     Psychotic Disorder Paternal Grandfather      ?     Respiratory Paternal Grandfather       of emphysema and smoking     Psychotic Disorder Sister      Other - See Comments Sister      small kidney stone      Cancer - colorectal No family hx of        Current Outpatient Prescriptions   Medication     B Complex Vitamins (VITAMIN B-COMPLEX PO)     blood glucose (NO BRAND SPECIFIED) lancets standard     blood glucose monitoring (NO BRAND SPECIFIED) meter device kit     blood glucose monitoring (NO BRAND SPECIFIED) test strip     order for DME     L-GLUTAMINE PO     gabapentin (NEURONTIN) 100 MG capsule     fluticasone (FLONASE) 50 MCG/ACT spray     LORazepam (ATIVAN) 1 MG tablet     loratadine (CLARITIN) 10 MG tablet     order for DME     order for DME     prochlorperazine (COMPAZINE) 10 MG tablet     rivaroxaban ANTICOAGULANT (XARELTO) 20 MG TABS tablet     traZODone (DESYREL) 50 MG tablet     order for DME     order for DME     Multiple Vitamins-Minerals (MULTIVITAMIN ADULT PO)     Lisdexamfetamine Dimesylate (VYVANSE PO)     order for DME     CALCIUM-MAGNESIUM-VITAMIN D PO     Probiotic Product (PRO-BIOTIC BLEND PO)     atorvastatin (LIPITOR) 80 MG tablet      order for DME     lisinopril (PRINIVIL/ZESTRIL) 10 MG tablet     metFORMIN (GLUCOPHAGE-XR) 500 MG 24 hr tablet     escitalopram (LEXAPRO) 20 MG tablet     EPINEPHrine (EPIPEN) 0.3 MG/0.3ML injection     Cholecalciferol (VITAMIN D) 1000 UNIT capsule     No current facility-administered medications for this visit.           Allergies   Allergen Reactions     Wasps [Hornets] Anaphylaxis     No Clinical Screening - See Comments      Taxol      Paclitaxel Difficulty breathing     Sulfa Drugs Hives       Review of Systems  General: Denies fatigue, changes in weight, weakness, appetite changes, night sweats, hot flashes, fever, chills, or difficulty sleeping  HEENT: + visual difficulty. Denies headaches, hair loss, spots or floaters, diplopia, masses, head injury, tinnitus, hearing loss, epistaxis, congestion, problems with teeth or gums, dysphonia, or dysphagia  Pulmonary: Denies cough, sputum, hemoptysis, shortness of breath, dyspnea on exertion, wheezing, or allergies  Cardiovascular: Denies chest pain, fainting, palpitations, murmurs, activity intolerance, swelling in legs, or high blood pressure  Gastrointestinal: + diarrhea. Denies nausea, vomiting, constipation, abdominal pain, bloating, heartburn, melena, hematochezia, or jaundice  Genitourinary: Denies dysuria, urinary urgency or frequency, hematuria, cloudy or malodorous urine, incontinence, repeat urinary tract infections, flank pain, pelvic pain, vaginal bleeding, vaginal discharge, or vaginal dryness  Sexual Function: Denies pain with intercourse, changes in libido, arousal difficulty, or changes in orgasm  Integumentary: Denies rashes, sores, changing moles, or scarring  Hematologic: Denies swollen lymph nodes, masses, easy bruising, or easy bleeding  Musculoskeletal: + arthralgias. Denies falls, back pain, myalgias, stiffness, muscle weakness or muscle cramps  Neurologic: + numbness and tingling, dizziness. Denies changes in memory, difficulty with  walking, seizures, or tremors  Psychiatric: + mood changes. Denies anxiety, depression, suicidal thoughts, or difficulty concentrating  Endocrine: Denies polydipsia, polyuria, temperature intolerance, or history of thyroid disease        OBJECTIVE:    Physical Exam:    /73 (BP Location: Right arm, Patient Position: Sitting, Cuff Size: Adult Regular)  Pulse 89  Temp 98.3  F (36.8  C) (Oral)  Resp 18  Wt 83.8 kg (184 lb 12.8 oz)  LMP 01/01/2009  SpO2 96%  BMI 32.75 kg/m2    CONSTITUTIONAL: Alert non-toxic appearing female in no acute distress  HEAD: Normocephalic, atraumatic  EYES: PERRLA; no scleral icterus  ENT: Oropharynx pink without lesions  NECK: Neck supple without lymphadenopathy  RESPIRATORY: Lungs clear to auscultation, no increased work of breathing noted  CV: Regular rate and rhythm, S1S2, no clicks, murmurs, rubs, or gallops; bilateral lower extremities without edema, dorsalis pedis pulses 2+ bilaterally  GASTROINTESTINAL: Normoactive bowel sounds x4 quadrants, abdomen soft, non-distended, and non-tender to palpation without masses or organomegaly  GENITOURINARY: External genitalia and urethral meatus pink without lesions, masses, or excoriation. Vagina pink and moist without masses or lesions. Vaginal cuff without nodularity, masses, or lesions. Cervix surgically absent. Bimanual exam reveals no masses or fullness. Rectovaginal exam confirms these findings.  LYMPHATIC: Cervical, supraclavicular, and inguinal nodes without lymphadenopathy  MUSCULOSKELETAL: Moves all extremities, no obvious muscle wasting  NEUROLOGIC: No gross deficits, normal gait  SKIN: Appropriate color for race, warm and dry, no rashes or lesions to unclothed skin; flesh colored mobile cystic subcutaneous lesion <1cm in diameter to L lower back  PSYCHIATRIC: Pleasant and interactive, affect bright, makes appropriate eye contact, thought process linear      Data:   pending    Assessment/Plan:  1) Stage IC2 clear cell  ovarian carcinoma: No signs of recurrence. Continue surveillance every three months x2 years (through 10/2019) followed by every six months x3 years (through 10/2022) then annually thereafter with  and pelvic/rectal exam. Reviewed signs and symptoms  of recurrence including but not limited to bleeding from vagina, bladder, or rectum, bloating, early satiety, swelling in the lower extremities, abdominal or lower back pain, changes in bowel or bladder patterns, shortness of breath, increased fatigue, unexplained weight loss, and night sweats. Reviewed signs and symptoms for when she should contact the clinic or seek additional care. Patient to contact the clinic with any questions or concerns in the interim.  2) Pulmonary nodules: Follow up CT in three months. Will obtain CT CAP to also assess for soft tissue nodule near splenic hilum and pancreatic tail.  3) Neuropathy: Stable. Continue to follow up with acupuncture.  4) Mental health: She seems to be dealing with a fair amount of stress right now. Encouraged her to set up an appointment with Dr. Vasquez again as this has been helpful in the past.  5) Genetic testing: Hafsa is negative for mutations in the AIP, ALK, APC, COSTA, BAP1, BARD1, BLM, BRCA1, BRCA2, BRIP1, BMPR1A, CDH1, CDK4, CDKN1B, CDKN2A, CHEK2, DICER1, EPCAM, FANCC, FH, FLCN, GALNT12, GREM1, HOXB13, MAX, MEN1, MET, MITF, MLH1, MRE11A, MSH2, MSH6, MUTYH, NBN, NF1, NF2, PALB2, PHOX2B, PMS2, POLD1, POLE, POT1, FTEMO0C, PTCH1, PTEN, RAD50, RAD51C, RAD51D, RB1, RET, SDHA, SDHAF2, SDHB, SDHC, SDHD, SMAD4, SMARCA4, SMARCB1, SMARCE1, STK11, SUFU, OFMR000, TP53, TSC1, TSC2, VHL, and XRCC2 genes. No mutations were found in any of the 67 genes analyzed.   6) Labs:   7) Health maintenance issues discussed today include to follow up with PCP for co-morbid conditions and non-gynecologic issues. To follow up with PCP for concerns regarding sebaceous cyst and breast MRI.  8) Patient verbalized understanding of  and agreement with plan.    A total of 20 minutes of face to face time spent with patient, over 50% of which was spent in counseling and coordination of care.    MARLON Ogden, FNP-C  Division of Gynecologic Oncology  [facility]  Pager: [phone]

## 2018-02-27 ENCOUNTER — TELEPHONE (OUTPATIENT)
Dept: ONCOLOGY | Facility: CLINIC | Age: 63
End: 2018-02-27

## 2018-02-27 NOTE — TELEPHONE ENCOUNTER
RN reached out to patient to discuss  result and plan. Patient was unavailable. Voicemail with call back number left.     Morelia Lehman RN

## 2018-03-02 ENCOUNTER — OFFICE VISIT (OUTPATIENT)
Dept: FAMILY MEDICINE | Facility: CLINIC | Age: 63
End: 2018-03-02
Payer: COMMERCIAL

## 2018-03-02 VITALS — SYSTOLIC BLOOD PRESSURE: 88 MMHG | DIASTOLIC BLOOD PRESSURE: 59 MMHG | HEART RATE: 104 BPM | OXYGEN SATURATION: 95 %

## 2018-03-02 DIAGNOSIS — L72.3 INFECTED SEBACEOUS CYST OF SKIN: Primary | ICD-10-CM

## 2018-03-02 DIAGNOSIS — L08.9 INFECTED SEBACEOUS CYST OF SKIN: Primary | ICD-10-CM

## 2018-03-02 PROCEDURE — 99213 OFFICE O/P EST LOW 20 MIN: CPT | Performed by: FAMILY MEDICINE

## 2018-03-02 RX ORDER — CEPHALEXIN 500 MG/1
500 CAPSULE ORAL 3 TIMES DAILY
Qty: 21 CAPSULE | Refills: 0 | Status: SHIPPED | OUTPATIENT
Start: 2018-03-02 | End: 2018-03-09

## 2018-03-02 NOTE — MR AVS SNAPSHOT
After Visit Summary   3/2/2018    Hafsa Corona    MRN: [id number]           Patient Information     Date Of Birth          1955        Visit Information        Provider Department      3/2/2018 2:15 PM Emmanuel Mixon MD [facility]        Today's Diagnoses     Infected sebaceous cyst of skin    -  1      Care Instructions    Consider follow-up in 1 week if symptoms not resolved.  Could coincide with preop at that time.  Fluids.  Should improve with antibiotics.          Follow-ups after your visit        Who to contact     If you have questions or need follow up information about today's clinic visit or your schedule please contact [facility] directly at [phone].  Normal or non-critical lab and imaging results will be communicated to you by Navion[employer], letter or phone within 4 business days after the clinic has received the results. If you do not hear from us within 7 days, please contact the clinic through Navionicshart or phone. If you have a critical or abnormal lab result, we will notify you by phone as soon as possible.  Submit refill requests through [employer] or call your pharmacy and they will forward the refill request to us. Please allow 3 business days for your refill to be completed.          Additional Information About Your Visit        MyChart Information     [employer] gives you secure access to your electronic health record. If you see a primary care provider, you can also send messages to your care team and make appointments. If you have questions, please call your primary care clinic.  If you do not have a primary care provider, please call [phone] and they will assist you.        Care EveryWhere ID     This is your Care EveryWhere ID. This could be used by other organizations to access your [place] medical records  [id number]        Your Vitals Were     Pulse Last Period Pulse Oximetry Breastfeeding?          104 01/01/2009  95% No         Blood Pressure from Last 3 Encounters:   03/02/18 (!) 88/59   02/26/18 119/73   12/01/17 116/73    Weight from Last 3 Encounters:   02/26/18 184 lb 12.8 oz (83.8 kg)   12/01/17 189 lb (85.7 kg)   11/20/17 189 lb 3.2 oz (85.8 kg)              Today, you had the following     No orders found for display         Today's Medication Changes          These changes are accurate as of 3/2/18  2:55 PM.  If you have any questions, ask your nurse or doctor.               Start taking these medicines.        Dose/Directions    cephALEXin 500 MG capsule   Commonly known as:  KEFLEX   Used for:  Infected sebaceous cyst of skin   Started by:  Emmanuel Mixon MD        Dose:  500 mg   Take 1 capsule (500 mg) by mouth 3 times daily for 7 days   Quantity:  21 capsule   Refills:  0         These medicines have changed or have updated prescriptions.        Dose/Directions    metFORMIN 500 MG 24 hr tablet   Commonly known as:  GLUCOPHAGE-XR   This may have changed:    - how much to take  - when to take this  - additional instructions   Used for:  Type 2 diabetes mellitus without complication, without long-term current use of insulin (H)        Take two tabs by mouth once daily with evening meal.   Quantity:  180 tablet   Refills:  PRN            Where to get your medicines      These medications were sent to [id number] - [place], [place] - [address] AT [facility]  [place]  [address], [place] [address]    Hours:  24-hours Phone:  [phone]     cephALEXin 500 MG capsule                Primary Care Provider Office Phone # Fax #    Morelia Ayon [phone] [address] [facility] [address]        Equal Access to Services     RONEY ARANDA AH: Ward Leach, wayaritza leigh, qaybta kaalmafrancesca palm, sujatha friedman. So [facility] [phone].    ATENCIÓN: Si melvin esprad carranza a martínez disposición  servicios gratuitos de asistencia lingüística. Elizabeth krause [phone].    We comply with applicable federal civil rights laws and [place] laws. We do not discriminate on the basis of race, color, national origin, age, disability, sex, sexual orientation, or gender identity.            Thank you!     Thank you for choosing [facility]  for your care. Our goal is always to provide you with excellent care. Hearing back from our patients is one way we can continue to improve our services. Please take a few minutes to complete the written survey that you may receive in the mail after your visit with us. Thank you!             Your Updated Medication List - Protect others around you: Learn how to safely use, store and throw away your medicines at www.disposemymeds.org.          This list is accurate as of 3/2/18  2:55 PM.  Always use your most recent med list.                   Brand Name Dispense Instructions for use Diagnosis    atorvastatin 80 MG tablet    LIPITOR    90 tablet    Take 1 tablet (80 mg) by mouth daily    Hyperlipidemia LDL goal <100       blood glucose lancets standard    no brand specified    100 each    Use to test blood sugar 2 times daily or as directed.    Type 2 diabetes mellitus without complication, without long-term current use of insulin (H)       blood glucose monitoring meter device kit    no brand specified    1 kit    Use to test blood sugar 2 times daily or as directed.    Type 2 diabetes mellitus without complication, without long-term current use of insulin (H)       blood glucose monitoring test strip    no brand specified    100 strip    Use to test blood sugars 2 times daily or as directed    Type 2 diabetes mellitus without complication, without long-term current use of insulin (H)       CALCIUM-MAGNESIUM-VITAMIN D PO      Take 2 tablets by mouth daily        cephALEXin 500 MG capsule    KEFLEX    21 capsule    Take 1 capsule (500 mg) by mouth 3 times daily for 7  "days    Infected sebaceous cyst of skin       EPINEPHrine 0.3 MG/0.3ML injection 2-pack    EPIPEN/ADRENACLICK/or ANY BX GENERIC EQUIV    1 each    Inject 0.3 mLs (0.3 mg) into the muscle once as needed for anaphylaxis    Bee allergy status       escitalopram 20 MG tablet    LEXAPRO    30 tablet    Take 1 tablet (20 mg) by mouth daily    Major depressive disorder, recurrent episode, mild (H)       fluticasone 50 MCG/ACT spray    FLONASE    48 mL    SHAKE WELL AND USE 2 SPRAYS IN EACH NOSTRIL DAILY    Chronic maxillary sinusitis       lisinopril 10 MG tablet    PRINIVIL/ZESTRIL    90 tablet    Take 1 tablet (10 mg) by mouth daily    Essential hypertension       loratadine 10 MG tablet    CLARITIN    30 tablet    Take 1 tablet (10 mg) by mouth daily    Pain in joint, multiple sites       LORazepam 1 MG tablet    ATIVAN    30 tablet    Take 1 tablet (1 mg) by mouth every 6 hours as needed (nausea/vomiting, anxiety or sleep)    Ovarian cancer, left (H), Encounter for long-term (current) use of medications       metFORMIN 500 MG 24 hr tablet    GLUCOPHAGE-XR    180 tablet    Take two tabs by mouth once daily with evening meal.    Type 2 diabetes mellitus without complication, without long-term current use of insulin (H)       MULTIVITAMIN ADULT PO      Take 1 tablet by mouth daily        order for DME     1 Units    Equipment being ordered: blood pressure monitor    Essential hypertension       * order for DME     1 each    Compression stockings 20-30 mm Hg. To be wear when directed by Hematology team and while awake for the first two years post clot.    Long-term (current) use of anticoagulants, Acute deep vein thrombosis (DVT) of left lower extremity, unspecified vein (H)       * order for DME     1 Bottle    Plain packing strip 1/4\"    Abscess of leg       * order for DME     1 Box    Sterile zoey tipped applicators    Abscess of leg       * order for DME     3 Bottle    USE 1/4 INCH  WIDTH PLAIN NU GAUZE PACKING TO " DO SELF WOUND CARE OF LEFT LOWER LEG ONCE DAILY. DIMENSIONS OF WOUND PRESENTLY ARE 2 X 2 INCH AND APPROXIMATELY 1/2 INCH DEEP.  USES 6 INCHES OF PACKING CURRENTLY FOR DRESSING CHANGE.  FAX TO [facility] [phone]    Wound of left leg       * order for DME     30 pad    STERILE ADHESIVE PAD BANDAGE AT LEAST 4X4 INCH IN SIZE NEEDED FOR DAILY WOUND CARE TO LEFT LOWER LEG. FAX TO [facility] [phone]    Leg wound, left       * order for DME     1 Units    Home blood pressure monitor    Type 2 diabetes mellitus without complication, without long-term current use of insulin (H)       PRO-BIOTIC BLEND PO      Take 1 capsule by mouth daily Enzymatic Therapy-probiotic pearls        rivaroxaban ANTICOAGULANT 20 MG Tabs tablet    XARELTO    30 tablet    Take 1 tablet (20 mg) by mouth daily (with dinner)    Long-term (current) use of anticoagulants, Acute deep vein thrombosis (DVT) of left lower extremity, unspecified vein (H)       traZODone 50 MG tablet    DESYREL    180 tablet    TAKE 1 TO 2 TABLETS(50  MG) BY MOUTH EVERY NIGHT AS NEEDED FOR SLEEP    Insomnia, unspecified type       VITAMIN B-COMPLEX PO           vitamin D 1000 UNITS capsule      Take 2,000 Units by mouth daily        * Notice:  This list has 6 medication(s) that are the same as other medications prescribed for you. Read the directions carefully, and ask your doctor or other care provider to review them with you.

## 2018-03-02 NOTE — NURSING NOTE
"Chief Complaint   Patient presents with     Derm Problem       Initial BP (!) 88/59 (BP Location: Right arm, Patient Position: Chair, Cuff Size: Adult Regular)  Pulse 104  LMP 01/01/2009  SpO2 95%  Breastfeeding? No Estimated body mass index is 32.75 kg/(m^2) as calculated from the following:    Height as of 12/1/17: 5' 2.99\" (1.6 m).    Weight as of 2/26/18: 184 lb 12.8 oz (83.8 kg).  Medication Reconciliation: complete     Hafsa Conde MA     "

## 2018-03-02 NOTE — PATIENT INSTRUCTIONS
Consider follow-up in 1 week if symptoms not resolved.  Could coincide with preop at that time.  Fluids.  Should improve with antibiotics.

## 2018-03-02 NOTE — PROGRESS NOTES
SUBJECTIVE:   Hafsa Corona is a 62 year old female who presents to clinic today for the following health issues:      Concern - Derm problem   Onset: x 1 week     Description:     Lower back - lump - discomfort - sensitive to the touch - red - change in size, perhaps a little bigger    Intensity: 2/10    Progression of Symptoms:  same    Accompanying Signs & Symptoms:  Denies pus - drainage - fever - chills - nausea - itchiness     Previous history of similar problem:   Yes     Precipitating factors:   Worsened by: None     Alleviating factors:  Improved by: None     Therapies Tried and outcome: None     Cold compress helped.    She had two types of cancer. Ovarian cancer stage 1C and a separate primary cancer in November - removed a month ago stage 1A.    Blood sugars 115 yesterday and 110 today. A1c has been steady around 6.1      EXAM:  BP (!) 88/59 (BP Location: Right arm, Patient Position: Chair, Cuff Size: Adult Regular)  Pulse 104  LMP 01/01/2009  SpO2 95%  Breastfeeding? No  Constitutional: Healthy, alert, no distress   Skin: erythematous, indurated region on left lower back approx 1.5mc in diameter, no fluctuance.    ASSESSMENT    ICD-10-CM    1. Infected sebaceous cyst of skin L72.3 cephALEXin (KEFLEX) 500 MG capsule    L08.9       Plan:  Patient Instructions   Consider follow-up in 1 week if symptoms not resolved.  Could coincide with preop at that time.  Fluids.  Should improve with antibiotics.     Emmanuel Bal MD  Family Medicine Physician

## 2018-03-06 ENCOUNTER — TELEPHONE (OUTPATIENT)
Dept: ONCOLOGY | Facility: CLINIC | Age: 63
End: 2018-03-06

## 2018-03-06 NOTE — TELEPHONE ENCOUNTER
Hafsa left me a voicemail requesting the genetic test results to be sent to her email address so that she can share her genetic test results with her family members easily. I called Hafsa back to confirm the email address and Hafsa was comfortable with the level of security with the exchange of health information via email. I will send her genetic test results to her email.     She also requested if she could be scheduled with Stephie Karimi for high risk breast cancer screening. I sent a request to the scheduling team and this has been taken care of.      Mariam Sams MS, [facility]  Licensed Genetic Counselor  T: [phone]  F: [phone]

## 2018-03-08 ENCOUNTER — MYC MEDICAL ADVICE (OUTPATIENT)
Dept: ONCOLOGY | Facility: CLINIC | Age: 63
End: 2018-03-08

## 2018-03-08 NOTE — TELEPHONE ENCOUNTER
Spoke with pt  Letter rewritten as original was not locatable  Confirmed with pt wording of letter  Letter written and faxed  Left message for pt this was done

## 2018-03-09 ENCOUNTER — MYC MEDICAL ADVICE (OUTPATIENT)
Dept: FAMILY MEDICINE | Facility: CLINIC | Age: 63
End: 2018-03-09

## 2018-03-09 DIAGNOSIS — L72.3 INFECTED SEBACEOUS CYST OF SKIN: ICD-10-CM

## 2018-03-09 DIAGNOSIS — L08.9 INFECTED SEBACEOUS CYST OF SKIN: ICD-10-CM

## 2018-03-09 RX ORDER — CEPHALEXIN 500 MG/1
500 CAPSULE ORAL 3 TIMES DAILY
Qty: 21 CAPSULE | Refills: 0 | Status: SHIPPED | OUTPATIENT
Start: 2018-03-09 | End: 2018-05-17

## 2018-03-12 ENCOUNTER — OFFICE VISIT (OUTPATIENT)
Dept: FAMILY MEDICINE | Facility: CLINIC | Age: 63
End: 2018-03-12
Payer: COMMERCIAL

## 2018-03-12 VITALS
HEART RATE: 89 BPM | RESPIRATION RATE: 18 BRPM | DIASTOLIC BLOOD PRESSURE: 70 MMHG | SYSTOLIC BLOOD PRESSURE: 92 MMHG | TEMPERATURE: 97.8 F | OXYGEN SATURATION: 100 %

## 2018-03-12 DIAGNOSIS — L72.3 SEBACEOUS CYST: Primary | ICD-10-CM

## 2018-03-12 PROCEDURE — 99213 OFFICE O/P EST LOW 20 MIN: CPT | Performed by: FAMILY MEDICINE

## 2018-03-12 NOTE — MR AVS SNAPSHOT
After Visit Summary   3/12/2018    Hfasa Corona    MRN: [id number]           Patient Information     Date Of Birth          1955        Visit Information        Provider Department      3/12/2018 1:00 PM Emmanuel Mixon MD [facility]        Today's Diagnoses     Sebaceous cyst    -  1      Care Instructions    I understand that you are concerned about risk for UTI.  My recommendation is if burning and/or frequency of urination develop this week, to come in for lab only appointment for a urine sample. I am comfortable ordering this without an office visit so long as I am in the clinic which this week will be Wednesday and Friday.    It is a good idea to check with dermatology for a skin screening, but also to discuss removal of the cyst given its recent infection and location.          Follow-ups after your visit        Additional Services     DERMATOLOGY REFERRAL       Your provider has referred you to: [facility]: [facility] PRASHANT Villalobos [phone]   [email or web address]    Please be aware that coverage of these services is subject to the terms and limitations of your health insurance plan.  Call member services at your health plan with any benefit or coverage questions.      Please bring the following with you to your appointment:    (1) Any X-Rays, CTs or MRIs which have been performed.  Contact the facility where they were done to arrange for  prior to your scheduled appointment.    (2) List of current medications  (3) This referral request   (4) Any documents/labs given to you for this referral                  Your next 10 appointments already scheduled     Mar 16, 2018  1:30 PM CDT   (Arrive by 1:15 PM)   New Patient Visit with MARLON Chiu [facility] ([facility])    [address]  [address]  [facility] [place] [address] [phone]              Who to contact      If you have questions or need follow up information about today's clinic visit or your schedule please contact [facility] directly at [phone].  Normal or non-critical lab and imaging results will be communicated to you by MyChart, letter or phone within 4 business days after the clinic has received the results. If you do not hear from us within 7 days, please contact the clinic through [employer] or phone. If you have a critical or abnormal lab result, we will notify you by phone as soon as possible.  Submit refill requests through [employer] or call your pharmacy and they will forward the refill request to us. Please allow 3 business days for your refill to be completed.          Additional Information About Your Visit        [employer] Information     [employer] gives you secure access to your electronic health record. If you see a primary care provider, you can also send messages to your care team and make appointments. If you have questions, please call your primary care clinic.  If you do not have a primary care provider, please call [phone] and they will assist you.        Care EveryWhere ID     This is your Care EveryWhere ID. This could be used by other organizations to access your [place] medical records  [id number]        Your Vitals Were     Pulse Temperature Respirations Last Period Pulse Oximetry       89 97.8  F (36.6  C) (Oral) 18 01/01/2009 100%        Blood Pressure from Last 3 Encounters:   03/12/18 92/70   03/02/18 (!) 88/59   02/26/18 119/73    Weight from Last 3 Encounters:   02/26/18 184 lb 12.8 oz (83.8 kg)   12/01/17 189 lb (85.7 kg)   11/20/17 189 lb 3.2 oz (85.8 kg)              We Performed the Following     DERMATOLOGY REFERRAL          Today's Medication Changes          These changes are accurate as of 3/12/18  1:28 PM.  If you have any questions, ask your nurse or doctor.               These medicines have changed or have updated prescriptions.         Dose/Directions    metFORMIN 500 MG 24 hr tablet   Commonly known as:  GLUCOPHAGE-XR   This may have changed:    - how much to take  - when to take this  - additional instructions   Used for:  Type 2 diabetes mellitus without complication, without long-term current use of insulin (H)        Take two tabs by mouth once daily with evening meal.   Quantity:  180 tablet   Refills:  PRN                Primary Care Provider Office Phone # Fax #    Morelia AyonATTILA [phone] [address] [facility] [address]        Equal Access to Services     [facility]: Hadii aad ku hadasho Soomaali, waaxda luqadaha, qaybta kaalmada adeegyada, waxay mini hayalessandro betts . So [facility] [phone].    ATENCIÓN: Si habla español, tiene a martínez disposición servicios gratuitos de asistencia lingüística. [facility] [phone].    We comply with applicable federal civil rights laws and [place] laws. We do not discriminate on the basis of race, color, national origin, age, disability, sex, sexual orientation, or gender identity.            Thank you!     Thank you for choosing [facility]  for your care. Our goal is always to provide you with excellent care. Hearing back from our patients is one way we can continue to improve our services. Please take a few minutes to complete the written survey that you may receive in the mail after your visit with us. Thank you!             Your Updated Medication List - Protect others around you: Learn how to safely use, store and throw away your medicines at www.disposemymeds.org.          This list is accurate as of 3/12/18  1:28 PM.  Always use your most recent med list.                   Brand Name Dispense Instructions for use Diagnosis    atorvastatin 80 MG tablet    LIPITOR    90 tablet    Take 1 tablet (80 mg) by mouth daily    Hyperlipidemia LDL goal <100       blood glucose lancets standard    no brand specified    100 each    Use to  test blood sugar 2 times daily or as directed.    Type 2 diabetes mellitus without complication, without long-term current use of insulin (H)       blood glucose monitoring meter device kit    no brand specified    1 kit    Use to test blood sugar 2 times daily or as directed.    Type 2 diabetes mellitus without complication, without long-term current use of insulin (H)       blood glucose monitoring test strip    no brand specified    100 strip    Use to test blood sugars 2 times daily or as directed    Type 2 diabetes mellitus without complication, without long-term current use of insulin (H)       CALCIUM-MAGNESIUM-VITAMIN D PO      Take 2 tablets by mouth daily        cephALEXin 500 MG capsule    KEFLEX    21 capsule    Take 1 capsule (500 mg) by mouth 3 times daily    Infected sebaceous cyst of skin       EPINEPHrine 0.3 MG/0.3ML injection 2-pack    EPIPEN/ADRENACLICK/or ANY BX GENERIC EQUIV    1 each    Inject 0.3 mLs (0.3 mg) into the muscle once as needed for anaphylaxis    Bee allergy status       escitalopram 20 MG tablet    LEXAPRO    30 tablet    Take 1 tablet (20 mg) by mouth daily    Major depressive disorder, recurrent episode, mild (H)       fluticasone 50 MCG/ACT spray    FLONASE    48 mL    SHAKE WELL AND USE 2 SPRAYS IN EACH NOSTRIL DAILY    Chronic maxillary sinusitis       lisinopril 10 MG tablet    PRINIVIL/ZESTRIL    90 tablet    Take 1 tablet (10 mg) by mouth daily    Essential hypertension       loratadine 10 MG tablet    CLARITIN    30 tablet    Take 1 tablet (10 mg) by mouth daily    Pain in joint, multiple sites       LORazepam 1 MG tablet    ATIVAN    30 tablet    Take 1 tablet (1 mg) by mouth every 6 hours as needed (nausea/vomiting, anxiety or sleep)    Ovarian cancer, left (H), Encounter for long-term (current) use of medications       metFORMIN 500 MG 24 hr tablet    GLUCOPHAGE-XR    180 tablet    Take two tabs by mouth once daily with evening meal.    Type 2 diabetes mellitus  "without complication, without long-term current use of insulin (H)       MULTIVITAMIN ADULT PO      Take 1 tablet by mouth daily        order for DME     1 Units    Equipment being ordered: blood pressure monitor    Essential hypertension       * order for DME     1 each    Compression stockings 20-30 mm Hg. To be wear when directed by Hematology team and while awake for the first two years post clot.    Long-term (current) use of anticoagulants, Acute deep vein thrombosis (DVT) of left lower extremity, unspecified vein (H)       * order for DME     1 Bottle    Plain packing strip 1/4\"    Abscess of leg       * order for DME     1 Box    Sterile zoey tipped applicators    Abscess of leg       * order for DME     3 Bottle    USE 1/4 INCH  WIDTH PLAIN NU GAUZE PACKING TO DO SELF WOUND CARE OF LEFT LOWER LEG ONCE DAILY. DIMENSIONS OF WOUND PRESENTLY ARE 2 X 2 INCH AND APPROXIMATELY 1/2 INCH DEEP.  USES 6 INCHES OF PACKING CURRENTLY FOR DRESSING CHANGE.  FAX TO [facility] [phone]    Wound of left leg       * order for DME     30 pad    STERILE ADHESIVE PAD BANDAGE AT LEAST 4X4 INCH IN SIZE NEEDED FOR DAILY WOUND CARE TO LEFT LOWER LEG. FAX TO [employer] [facility] [phone]    Leg wound, left       * order for DME     1 Units    Home blood pressure monitor    Type 2 diabetes mellitus without complication, without long-term current use of insulin (H)       PRO-BIOTIC BLEND PO      Take 1 capsule by mouth daily Enzymatic Therapy-probiotic pearls        rivaroxaban ANTICOAGULANT 20 MG Tabs tablet    XARELTO    30 tablet    Take 1 tablet (20 mg) by mouth daily (with dinner)    Long-term (current) use of anticoagulants, Acute deep vein thrombosis (DVT) of left lower extremity, unspecified vein (H)       traZODone 50 MG tablet    DESYREL    180 tablet    TAKE 1 TO 2 TABLETS(50  MG) BY MOUTH EVERY NIGHT AS NEEDED FOR SLEEP    Insomnia, unspecified type       VITAMIN B-COMPLEX PO           vitamin D 1000 UNITS " capsule      Take 2,000 Units by mouth daily        * Notice:  This list has 6 medication(s) that are the same as other medications prescribed for you. Read the directions carefully, and ask your doctor or other care provider to review them with you.

## 2018-03-12 NOTE — PATIENT INSTRUCTIONS
I understand that you are concerned about risk for UTI.  My recommendation is if burning and/or frequency of urination develop this week, to come in for lab only appointment for a urine sample. I am comfortable ordering this without an office visit so long as I am in the clinic which this week will be Wednesday and Friday.    It is a good idea to check with dermatology for a skin screening, but also to discuss removal of the cyst given its recent infection and location.

## 2018-03-12 NOTE — PROGRESS NOTES
SUBJECTIVE:   Hafsa Corona is a 63 year old female who presents to clinic today for the following health issues:        Pt is here for a follow up on her cyst on lower left back.    She notes improvement of the cyst. Very minimal discomfort.    She did have a bad diarrhea episode and is wondering if she is now at risk for a UTI. She has had this happen to her in the past and she attributes to taking magnesium.    Was visiting a friend in the hospital yesterday who fell and punctured her left rib. Now Hafsa is noticing some discomfort in the left rib area.    ROS  No pain with urination  No frequency of urination    EXAM:  BP 92/70  Pulse 89  Temp 97.8  F (36.6  C) (Oral)  Resp 18  LMP 01/01/2009  SpO2 100%  Constitutional: Healthy, alert, no distress   Skin: no rash of left lower ribs/chest. 1.5cm cyst in lower left back in subcutaneous tissue.    ASSESSMENT    ICD-10-CM    1. Sebaceous cyst L72.3 DERMATOLOGY REFERRAL      Plan:  Patient Instructions   I understand that you are concerned about risk for UTI.  My recommendation is if burning and/or frequency of urination develop this week, to come in for lab only appointment for a urine sample. I am comfortable ordering this without an office visit so long as I am in the clinic which this week will be Wednesday and Friday.    It is a good idea to check with dermatology for a skin screening, but also to discuss removal of the cyst given its recent infection and location.     Emmanuel Bal MD  Family Medicine Physician

## 2018-03-16 ENCOUNTER — ALLIED HEALTH/NURSE VISIT (OUTPATIENT)
Dept: ONCOLOGY | Facility: CLINIC | Age: 63
End: 2018-03-16

## 2018-03-16 ENCOUNTER — ONCOLOGY VISIT (OUTPATIENT)
Dept: ONCOLOGY | Facility: CLINIC | Age: 63
End: 2018-03-16
Attending: CLINICAL NURSE SPECIALIST
Payer: COMMERCIAL

## 2018-03-16 VITALS
RESPIRATION RATE: 18 BRPM | DIASTOLIC BLOOD PRESSURE: 70 MMHG | BODY MASS INDEX: 33.63 KG/M2 | TEMPERATURE: 98.9 F | HEIGHT: 63 IN | HEART RATE: 102 BPM | SYSTOLIC BLOOD PRESSURE: 104 MMHG | OXYGEN SATURATION: 97 % | WEIGHT: 189.8 LBS

## 2018-03-16 DIAGNOSIS — R92.30 DENSE BREAST TISSUE ON MAMMOGRAM: ICD-10-CM

## 2018-03-16 DIAGNOSIS — Z12.31 ENCOUNTER FOR SCREENING MAMMOGRAM FOR HIGH-RISK PATIENT: ICD-10-CM

## 2018-03-16 DIAGNOSIS — Z71.9 VISIT FOR COUNSELING: Primary | ICD-10-CM

## 2018-03-16 DIAGNOSIS — Z91.89 AT HIGH RISK FOR BREAST CANCER: ICD-10-CM

## 2018-03-16 DIAGNOSIS — Z80.3 FAMILY HISTORY OF MALIGNANT NEOPLASM OF BREAST: ICD-10-CM

## 2018-03-16 DIAGNOSIS — Z12.39 ENCOUNTER FOR BREAST CANCER SCREENING OTHER THAN MAMMOGRAM: Primary | ICD-10-CM

## 2018-03-16 PROCEDURE — 99215 OFFICE O/P EST HI 40 MIN: CPT | Mod: ZP | Performed by: CLINICAL NURSE SPECIALIST

## 2018-03-16 PROCEDURE — G0463 HOSPITAL OUTPT CLINIC VISIT: HCPCS | Mod: ZF

## 2018-03-16 ASSESSMENT — PAIN SCALES - GENERAL: PAINLEVEL: NO PAIN (0)

## 2018-03-16 NOTE — PROGRESS NOTES
Oncology Risk Management Consultation:  Date on this visit: 3/16/2018    Hafsa Corona  is referred by Mariam Sams, Licensed Genetic Counselor,  for an oncology risk management consultation. She requires evaluation for her risk of cancer secondary to having a family history of breast cancer in her mother at age 63. She is considered to at high risk for breast cancer and has a 23.5% lifetime risk for breast cancer by the SHERON model.     Primary Physician: Morelia Ayon NP    History Of Present Illness:  Ms. Corona is a very pleasant 63 year old female who presents with family history of breast cancer.    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 Three month surveillance plan with [place].  Nulliparous.  Menarche at 10.  Hx of OCPs x6 years.  No hx of HRT.  Genetic testin2017 - Negative for mutations in the AIP, ALK, APC, COSTA, BAP1, BARD1, BLM, BRCA1, BRCA2, BRIP1, BMPR1A, CDH1, CDK4, CDKN1B, CDKN2A, CHEK2, DICER1, EPCAM, FANCC, FH, FLCN, GALNT12, GREM1, HOXB13, MAX, MEN1, MET, MITF, MLH1, MRE11A, MSH2, MSH6, MUTYH, NBN, NF1, NF2, PALB2, PHOX2B, PMS2, POLD1, POLE, POT1, JHMZY8K, PTCH1, PTEN, RAD50, RAD51C, RAD51D, RB1, RET, SDHA, SDHAF2, SDHB, SDHC, SDHD, SMAD4, SMARCA4, SMARCB1, SMARCE1, STK11, SUFU, MGKR184, TP53, TSC1, TSC2, VHL, and XRCC2 genes using a Cancer-Next Expanded panel through [employer].    Pertinent screening history:  2011 - Screening mammogram, normal by report   2014 - Screening mammogram, normal by report  9/15/2017 - Screening mammogram, BiRads1.     At this visit,  she reports that she doesn't check her breasts often, but that she has no concerns about breast health, and has not noted any asymmetry, lumps, masses,  thickenings, nipple discharge or nipple inversion or changes in skin of her breasts.    Past Medical/Surgical History:  Past Medical History:   Diagnosis Date     Anxiety state, unspecified     [id number]     Arthritis 2014    vague, gradual, not treated really, knees feel it     Attention deficit disorder without mention of hyperactivity      Cancer (H) 7/2017 and 1/2018    ovarian and kidney cancers, both treated well     Chronic rhinitis      Depressive disorder lifetime    plus Anxiety plus ADD. Escitalipram, therapy, ADD meds maybe     Depressive disorder, not elsewhere classified      Heart disease 1999    tachycardia and high cholesterol, managed     History of blood transfusion 7/2017    while in hospital for ovarian cancer     Hypertension 1999    tho BP was too LOW last week. past 12 years Generally OK     Panic disorder without agoraphobia      Pure hypercholesterolemia     Lipitor     Tachycardia, unspecified     9/1999-2/2004     Type II or unspecified type diabetes mellitus without mention of complication, not stated as uncontrolled     diagnosed 2004     Past Surgical History:   Procedure Laterality Date     BIOPSY  7/2017 and 2/2018    ovarian and kidney cancer biopsies. also 1987 breast benign     BREAST SURGERY  1987    date unsure. benign breast cyst removed     COLONOSCOPY  2008 and 2013    polyps removed. Next due fall 2018     HYSTERECTOMY TOTAL ABDOMINAL, BILATERAL SALPINGO-OOPHORECTOMY, NODE DISSECTION, COMBINED Left 7/7/2017    Procedure: COMBINED HYSTERECTOMY TOTAL ABDOMINAL, SALPINGO-OOPHORECTOMY, NODE DISSECTION;  Exploratory Laparotomy, Left Salpingo-Oophorectomy, Cancer Staging, Total Hysterectomy, Omentectomy, Evacuation of abdominal fluid, Lymph Node Dissection  Anesthesia Block ;  Surgeon: Serena Cole MD;  Location: [facility] OR     HYSTERECTOMY, PAP NO LONGER INDICATED  07/07/2017    Laparotomy; for ovarian cancer staging     INSERT PORT VASCULAR ACCESS Right 8/21/2017     Procedure: INSERT PORT VASCULAR ACCESS;  Single Lumen Chest Power Port;  Surgeon: Stephen Mike PA-C;  Location: [facility] OR     LYMPHADENECTOMY RETROPERITONEAL Bilateral 07/07/2017    Laparotomy; pelvics & para-aortics; for ovarian cancer staging     OMENTECTOMY  07/07/2017    Laparotomy; for ovarian cancer staging     ORTHOPEDIC SURGERY  1/2002    broken left jaw due to fall, 4 fractures, titanium plates     SALPINGO OOPHORECTOMY,R/L/KAYY Right 2008    Salpingo Oophorectomy, RT     SALPINGO OOPHORECTOMY,R/L/KAYY Left 07/07/2017    Laparotomy; for ovarian cancer staging     SURGICAL HISTORY OF -       facial surgery d/t fall in 1/2002     SURGICAL HISTORY OF -       1985 removal of breast cyst     SURGICAL HISTORY OF -   2008    endometrial ablation       Allergies:  Allergies as of 03/16/2018 - Trevin as Reviewed 03/16/2018   Allergen Reaction Noted     Wasps [hornets] Anaphylaxis 09/03/2009     No clinical screening - see comments  11/01/2017     Paclitaxel Difficulty breathing 08/30/2017     Sulfa drugs Hives 07/19/2005       Current Medications:  Current Outpatient Prescriptions   Medication Sig Dispense Refill     cephALEXin (KEFLEX) 500 MG capsule Take 1 capsule (500 mg) by mouth 3 times daily 21 capsule 0     B Complex Vitamins (VITAMIN B-COMPLEX PO)        blood glucose (NO BRAND SPECIFIED) lancets standard Use to test blood sugar 2 times daily or as directed. 100 each 11     blood glucose monitoring (NO BRAND SPECIFIED) meter device kit Use to test blood sugar 2 times daily or as directed. 1 kit 0     blood glucose monitoring (NO BRAND SPECIFIED) test strip Use to test blood sugars 2 times daily or as directed 100 strip PRN     fluticasone (FLONASE) 50 MCG/ACT spray SHAKE WELL AND USE 2 SPRAYS IN EACH NOSTRIL DAILY 48 mL PRN     LORazepam (ATIVAN) 1 MG tablet Take 1 tablet (1 mg) by mouth every 6 hours as needed (nausea/vomiting, anxiety or sleep) 30 tablet 1     loratadine (CLARITIN) 10 MG tablet Take 1  "tablet (10 mg) by mouth daily 30 tablet 1     rivaroxaban ANTICOAGULANT (XARELTO) 20 MG TABS tablet Take 1 tablet (20 mg) by mouth daily (with dinner) 30 tablet 11     traZODone (DESYREL) 50 MG tablet TAKE 1 TO 2 TABLETS(50  MG) BY MOUTH EVERY NIGHT AS NEEDED FOR SLEEP 180 tablet 1     Multiple Vitamins-Minerals (MULTIVITAMIN ADULT PO) Take 1 tablet by mouth daily       CALCIUM-MAGNESIUM-VITAMIN D PO Take 2 tablets by mouth daily       Probiotic Product (PRO-BIOTIC BLEND PO) Take 1 capsule by mouth daily Enzymatic Therapy-probiotic pearls       atorvastatin (LIPITOR) 80 MG tablet Take 1 tablet (80 mg) by mouth daily 90 tablet PRN     order for DME Equipment being ordered: blood pressure monitor 1 Units 0     lisinopril (PRINIVIL/ZESTRIL) 10 MG tablet Take 1 tablet (10 mg) by mouth daily 90 tablet PRN     escitalopram (LEXAPRO) 20 MG tablet Take 1 tablet (20 mg) by mouth daily 30 tablet PRN     Cholecalciferol (VITAMIN D) 1000 UNIT capsule Take 2,000 Units by mouth daily        order for DME Home blood pressure monitor (Patient not taking: Reported on 3/16/2018) 1 Units 0     order for DME STERILE ADHESIVE PAD BANDAGE AT LEAST 4X4 INCH IN SIZE NEEDED FOR DAILY WOUND CARE TO LEFT LOWER LEG.  FAX TO Commissioner -[phone] (Patient not taking: Reported on 3/16/2018) 30 pad 1     order for DME USE 1/4 INCH  WIDTH PLAIN NU GAUZE PACKING TO DO SELF WOUND CARE OF LEFT LOWER LEG ONCE DAILY.  DIMENSIONS OF WOUND PRESENTLY ARE 2 X 2 INCH AND APPROXIMATELY 1/2 INCH DEEP.  USES 6 INCHES OF PACKING CURRENTLY FOR DRESSING CHANGE.    FAX TO Commissioner -[phone] (Patient not taking: Reported on 3/12/2018) 3 Bottle 2     order for DME Plain packing strip 1/4\" (Patient not taking: Reported on 3/16/2018) 1 Bottle PRN     order for DME Sterile zoey tipped applicators (Patient not taking: Reported on 3/12/2018) 1 Box PRN     order for DME Compression stockings 20-30 mm Hg. To be wear when directed by Hematology " team and while awake for the first two years post clot. (Patient not taking: Reported on 3/16/2018) 1 each 0     metFORMIN (GLUCOPHAGE-XR) 500 MG 24 hr tablet Take two tabs by mouth once daily with evening meal. (Patient taking differently: 1,000 mg daily (with dinner) Take two tabs by mouth once daily with evening meal.) 180 tablet PRN     EPINEPHrine (EPIPEN) 0.3 MG/0.3ML injection Inject 0.3 mLs (0.3 mg) into the muscle once as needed for anaphylaxis (Patient not taking: Reported on 3/16/2018) 1 each PRN        Family History:  Family History   Problem Relation Age of Onset     C.A.D. Father      DIABETES Father      Later in his life, in his 70s     Hypertension Father      CEREBROVASCULAR DISEASE Father      1984 or      Psychotic Disorder Father      Pancreatic Cancer Father      Coronary Artery Disease Father      diagnosed in his late 50s (age)     Hyperlipidemia Father      treated w statins     Other Cancer Father      pancreatic, ,  of this     Depression Father      MENTAL ILLNESS Father      likely PTSD and depression     Obesity Father      C.A.D. Mother      Hypertension Mother      Breast Cancer Mother      2 times,  and ?     Psychotic Disorder Mother      Colon Cancer Mother      ?     CANCER Mother      Bone cancer     Coronary Artery Disease Mother      diagnosed in her late 70s (age)     Hyperlipidemia Mother      treated w. statins     Other Cancer Mother      bone/marrow, ,  of this     Depression Mother      Anxiety Disorder Mother      MENTAL ILLNESS Mother      various undiagnosed types, but THERE     Obesity Mother      Prostate Problems Brother      cleared      Hypertension Brother      Hyperlipidemia Brother      unsure if treated w statins     Depression Brother      Anxiety Disorder Brother      MENTAL ILLNESS Brother      undiagnosed but THERE     Obesity Brother      Psychotic Disorder Sister      x2     Neurologic Disorder Sister       Hypertension Sister      Hyperlipidemia Sister      treated w statins     Depression Sister      Anxiety Disorder Sister      Obesity Sister      Psychotic Disorder Brother      x2     Depression Brother      major depressive disorder and OCD     Anxiety Disorder Brother      MENTAL ILLNESS Brother      not sure of diagnoses, treated     Hypertension Brother      Hyperlipidemia Brother      Psychotic Disorder Maternal Grandmother      ?     Coronary Artery Disease Maternal Grandmother       of heart attack age 84?     Depression Maternal Grandmother      Psychotic Disorder Maternal Grandfather      ?     Psychotic Disorder Paternal Grandmother      Schizophernia     Coronary Artery Disease Paternal Grandmother       of heart attack, age 85?     Depression Paternal Grandmother      severe, but recovered     MENTAL ILLNESS Paternal Grandmother      possible bipolar or other     Psychotic Disorder Paternal Grandfather      ?     Respiratory Paternal Grandfather       of emphysema and smoking     Psychotic Disorder Sister      Other - See Comments Sister      small kidney stone      Hypertension Sister      Hyperlipidemia Sister      treated w statins     Depression Sister      Anxiety Disorder Sister      Cancer - colorectal No family hx of        Social History:  Social History     Social History     Marital status: Single     Spouse name: N/A     Number of children: N/A     Years of education: N/A     Occupational History      Self Employed.     Social History Main Topics     Smoking status: Never Smoker     Smokeless tobacco: Never Used     Alcohol use Yes      Comment: Very infrequent, a bit of wine or beer 2x per month maybe     Drug use: Yes     Special: Marijuana      Comment: infrequent, for celebrations only, maybe 8x per year     Sexual activity: Yes     Partners: Male     Birth control/ protection: None      Comment: long-time      Other Topics Concern     Parent/Sibling W/ Cabg, Mi Or  "Angioplasty Before 65f 55m? No     Social History Narrative     Physical Exam:  /70 (BP Location: Right arm, Patient Position: Sitting, Cuff Size: Adult Regular)  Pulse 102  Temp 98.9  F (37.2  C) (Tympanic)  Resp 18  Ht 1.6 m (5' 2.99\")  Wt 86.1 kg (189 lb 12.8 oz)  LMP 01/01/2009  SpO2 97%  BMI 33.63 kg/m2    GENERAL APPEARANCE: healthy, alert and no distress     NECK: no adenopathy, no asymmetry or masses     LYMPHATICS: No cervical, supraclavicular or axillary lymphadenopathy     RESP: lungs clear to auscultation - no rales, rhonchi or wheezes     BREAST: A multipositional, bilateral breast exam was performed.  Very symmetrical, pendulous breasts. Right breast: no palpable dominant masses, no nipple discharge, no skin changes. Soft; fibrocystic changes throughout anterior portion of breast tissue.  Right axilla: no palpable adenopathy. Left breast: no palpable dominant masses, no nipple discharge, no skin changes. Left axilla: no palpable adenopathy. Soft, fibrocystic changes in UOQ of  tissue.   CARDIOVASCULAR: regular rate and rhythm, normal S1 S2, no S3 or S4 and no murmur.        SKIN: No suspicious lesions or rashes    Laboratory/Imaging Studies  Results for orders placed or performed in visit on 02/26/18      Result Value Ref Range     14 0 - 30 U/mL       ASSESSMENT  We reviewed the surveillance protocol for breast cancer and discussed signs and symptoms to be watchful for in between surveillance visits. She verbalized understanding of signs and symptoms to address with her physicians including lumps, thickening, swelling, tenderness, nipple discharge or changes in skin of breasts. She is currently having active surveillance for her ovarian cancer risk with the Gyn-Onc clinic and is following with Dr. Dick regarding her kidney cancer. We discussed concerns about the use of contrast dye, as she has only one kidney. I will consult with Dr. Gregorio and Dr. Dick for their " recommendations on this. If she is not cleared for using gadolinium, she should continue to have annual tomosynthesis mammograms and annual clinical breast exams with her primary care provider.    We also discussed following  a healthy lifestyle plan recommended by both NCCN and the American Cancer Society that can reduce the risk of breast cancer:  1 Limit alcohol consumption to less than 1 drink per day (1 drink=5 oz.wine, 12 oz. Beer or 1.5 oz. 80-proof liquor).  2. Exercise per American Cancer Society guidelines of at least 150 minutes of moderate-intensity activity or 75 minutes of vigorous activity each week. (Or a combination of both.) Exercise should be spread  out over the week. Regular recreational exercise has been shown to reduce the risk of cancer by 30%.   3. Maintain a healthy weight with a Body Mass Index between 19-24.9. A postmenopausal BMI of 30.7 has been shown to have increased the risk for breast cancer by 37% in some studies.  4. Do not use tobacco products and limit exposure to passive smoke.    INDIVIDUALIZED CANCER RISK MANAGEMENT PLAN:  Individualized Surveillance Plan for women  With 20% or greater lifetime risk of breast cancer   Per NCCN Breast Cancer Screening and Diagnosis Guidelines Version 1.2017    Recommended screening Test or procedure Last done Next Scheduled    Clinical encounter Ongoing risk assessment, risk reduction counseling and clinical breast exam, every 6-12 months.   3/16/2018   Sept. 2018   However, some family histories with breast cancers at a very young age, may warrant screening starting earlier.    *May begin at age 40 if breast cancers in the family occur at later ages.    Annual mammogram beginning 10 years younger than the earliest breast cancer in the family but not prior to age 30.    Recommend annual breast MRI to begin 10 years younger than the earliest breast cancer in the family but not prior to age 25.    Breast MRIs are preferably done on day 7-15 of  the menstrual cycle in premenopausal women. 9/15/2017 - Screening mammogram, BiRads1 (normal) NEXT: Breast MRI soon, if cleared by urologist    Next exam: Sept. Followed by tomosynthesis mammogram   Breast screening for patients at high risk due to thoracic radiation between the ages of 10-30     Begin 8-10 years post radiation treatment or age 25.   Annual mammogram   and  Annual breast MRI, preferably on day 7-15 of menstrual cycle for premenopausal women.    Annual clinical breast exams with ongoing risk assessment and risk reduction counseling until age 25, then every 6-12 months.     NA     NA       I spent 60 minutes with the patient with greater that 50% of it in counseling and coordinating care as documented above.    Stephie Karimi, MARLON-CNS, OCN, ANG-BC  Clinical Nurse Specialist  Cancer Risk Management Program  [address]  [place] [address]    phone:  [phone]  Pager: [phone]  fax: [phone]    Cc: Morelia Ayon NP

## 2018-03-16 NOTE — PATIENT INSTRUCTIONS
Individualized Surveillance Plan for women  With 20% or greater lifetime risk of breast cancer   Per [facility] Breast Cancer Screening and Diagnosis Guidelines Version 1.2017    Recommended screening Test or procedure Last done Next Scheduled    Clinical encounter Ongoing risk assessment, risk reduction counseling and clinical breast exam, every 6-12 months.   3/16/2018   Sept. 2018   However, some family histories with breast cancers at a very young age, may warrant screening starting earlier.    *May begin at age 40 if breast cancers in the family occur at later ages.    Annual mammogram beginning 10 years younger than the earliest breast cancer in the family but not prior to age 30.    Recommend annual breast MRI to begin 10 years younger than the earliest breast cancer in the family but not prior to age 25.    Breast MRIs are preferably done on day 7-15 of the menstrual cycle in premenopausal women. 9/15/2017 - Screening mammogram, BiRads1 (normal) NEXT: Breast MRI soon if cleared by urologist    Next exam: Sept. Followed by tomosynthesis mammogram   Breast screening for patients at high risk due to thoracic radiation between the ages of 10-30     Begin 8-10 years post radiation treatment or age 25.   Annual mammogram   and  Annual breast MRI, preferably on day 7-15 of menstrual cycle for premenopausal women.    Annual clinical breast exams with ongoing risk assessment and risk reduction counseling until age 25, then every 6-12 months.     NA     NA       Magnetic Resonance Imaging (MRI) of the Breast  Magnetic resonance imaging (MRI) of the breast is an imaging test that uses strong magnets and radio waves to form pictures of the inside of the breast. It also creates images of the tissues that surround the breast. Breast MRI is used to check for problems, such as a leaking breast implant or a suspicious lump or mass. It can also be used to help determine if breast cancer is present and aid in diagnosis and  management. Most MRI tests take 30 to 60 minutes. Depending on the type of MRI you are having, the test may take longer. Give yourself extra time to check in.      During a breast MRI, you lie face down on a platform that slides into a tubelike machine called a scanner.   Before your test    Follow any directions you are given for taking medicines and for not eating or drinking before the test.    Follow your normal daily routine unless your provider tells you otherwise.    You ll be asked to remove your watch, hearing aids, credit cards, pens, eyeglasses, and other metal objects.    You may be asked to remove your makeup. Makeup may contain some metal.  MRI uses strong magnets. Metal is affected by magnets and can distort the image. The magnet used in MRI can cause metal objects in your body to move. If you have a metal implant, you may not be able to have an MRI unless the implant is certified as MRI safe. People with these implants should not have an MRI:    Ear (cochlear) implants    Certain clips used for brain aneurysms    Certain metal coils put in blood vessels    Most defibrillators    Most pacemakers  The radiologist or technologist may ask you if you:    Have had stereotactic breast biopsy    Have a pacemaker or implanted cardiac defibrillator    Have an artificial body part (prosthesis)    Have metal rods, screws, plates, or splinters in your body    Wear a medicated adhesive patch    Have tattoos or body piercings. Some tattoo inks contain metal.    Have implanted nerve stimulators or drug-infusion ports    Work with metal    Have braces    Have a bullet or other metal in your body  Tell the radiologist or technologist if you:    Are allergic to any medicines    Are pregnant or think you may be pregnant    Are afraid of small, enclosed spaces (claustrophobic)    Have any serious health problems (If you have kidney disease or a liver transplant  you may not be able to have the contrast material used for  MRI)    Have had previous surgeries    Are breastfeeding   During your test    You may be asked to wear a hospital gown.    You may be given earplugs to wear if you desire.    You may be injected with contrast through an intravenous (IV) line in your hand or arm. This is a special dye that makes the MRI image sharp that may be needed depending on the reason for your exam.    You ll lie on a platform that slides into a tube-like machine called a scanner. You ll be on your stomach with your breasts placed through openings in the platform.    Remain as still as you can while the camera takes the pictures. This will ensure the best images.  After your test    You can get back to normal activities right away.    If you were given contrast, it will pass naturally through your body within a day.    Drink lots of water so that the dye passes quickly out of your body.  Getting your results  Your healthcare provider will discuss the test results with you during a follow-up appointment or over the phone.  Date Last Reviewed: 2/1/2017 2000-2017 [employer]. [address] [place] [address]. All rights reserved. This information is not intended as a substitute for professional medical care. Always follow your healthcare professional's instructions.      Understanding Breast Density  What is breast density?  Breasts are made of connective tissue, glandular tissue and fatty tissue. Dense breasts have a lot of the first two types of tissue, but not much fat.  Why is it important?  Having dense breasts means you have a slightly higher risk of getting breast cancer. It also means your mammograms will be harder to read. This is because both dense tissue and lumps look white on a mammogram. Fatty tissue looks black. The pictures below show the 4 levels of breast density:     How do I know if I have dense breasts?  Only a mammogram can tell you if you have dense breasts. The person who reviews your mammogram  (radiologist) will give your breasts a density score based on the levels shown above.  What should I do?  Talk to your doctor about your options. But know that mammograms are proven to reduce cancer deaths. Plus, a yearly mammogram is even more important for women who have a higher risk of breast cancer. Your doctor may order other tests as well.  You should also know how your breasts look and feel. Any time you feel or see something that isn't normal, tell your doctor.  For informational purposes only. Not to replace the advice of your health care provider. Images courtesy American College of Radiology (ACR)   and Society of Breast Imaging (SBI). Used with permission. Text copyright   2014 [employer]. All rights reserved. [employer] [id number] - 08/14.

## 2018-03-16 NOTE — MR AVS SNAPSHOT
After Visit Summary   3/16/2018    Hafsa Corona    MRN: [id number]           Patient Information     Date Of Birth          1955        Visit Information        Provider Department      3/16/2018 1:30 PM Stephie Karimi APRN [facility]        Today's Diagnoses     Encounter for breast cancer screening other than mammogram    -  1    Encounter for screening mammogram for high-risk patient        Family history of malignant neoplasm of breast        At high risk for breast cancer        Dense breast tissue on mammogram          Care Instructions    Individualized Surveillance Plan for women  With 20% or greater lifetime risk of breast cancer   Per NCCN Breast Cancer Screening and Diagnosis Guidelines Version 1.2017    Recommended screening Test or procedure Last done Next Scheduled    Clinical encounter Ongoing risk assessment, risk reduction counseling and clinical breast exam, every 6-12 months.   3/16/2018   Sept. 2018   However, some family histories with breast cancers at a very young age, may warrant screening starting earlier.    *May begin at age 40 if breast cancers in the family occur at later ages.    Annual mammogram beginning 10 years younger than the earliest breast cancer in the family but not prior to age 30.    Recommend annual breast MRI to begin 10 years younger than the earliest breast cancer in the family but not prior to age 25.    Breast MRIs are preferably done on day 7-15 of the menstrual cycle in premenopausal women. 9/15/2017 - Screening mammogram, BiRads1 (normal) NEXT: Breast MRI soon if cleared by urologist    Next exam: Sept. Followed by tomosynthesis mammogram   Breast screening for patients at high risk due to thoracic radiation between the ages of 10-30     Begin 8-10 years post radiation treatment or age 25.   Annual mammogram   and  Annual breast MRI, preferably on day 7-15 of menstrual cycle for premenopausal women.    Annual  clinical breast exams with ongoing risk assessment and risk reduction counseling until age 25, then every 6-12 months.     NA     NA       Magnetic Resonance Imaging (MRI) of the Breast  Magnetic resonance imaging (MRI) of the breast is an imaging test that uses strong magnets and radio waves to form pictures of the inside of the breast. It also creates images of the tissues that surround the breast. Breast MRI is used to check for problems, such as a leaking breast implant or a suspicious lump or mass. It can also be used to help determine if breast cancer is present and aid in diagnosis and management. Most MRI tests take 30 to 60 minutes. Depending on the type of MRI you are having, the test may take longer. Give yourself extra time to check in.      During a breast MRI, you lie face down on a platform that slides into a tubelike machine called a scanner.   Before your test    Follow any directions you are given for taking medicines and for not eating or drinking before the test.    Follow your normal daily routine unless your provider tells you otherwise.    You ll be asked to remove your watch, hearing aids, credit cards, pens, eyeglasses, and other metal objects.    You may be asked to remove your makeup. Makeup may contain some metal.  MRI uses strong magnets. Metal is affected by magnets and can distort the image. The magnet used in MRI can cause metal objects in your body to move. If you have a metal implant, you may not be able to have an MRI unless the implant is certified as MRI safe. People with these implants should not have an MRI:    Ear (cochlear) implants    Certain clips used for brain aneurysms    Certain metal coils put in blood vessels    Most defibrillators    Most pacemakers  The radiologist or technologist may ask you if you:    Have had stereotactic breast biopsy    Have a pacemaker or implanted cardiac defibrillator    Have an artificial body part (prosthesis)    Have metal rods, screws,  plates, or splinters in your body    Wear a medicated adhesive patch    Have tattoos or body piercings. Some tattoo inks contain metal.    Have implanted nerve stimulators or drug-infusion ports    Work with metal    Have braces    Have a bullet or other metal in your body  Tell the radiologist or technologist if you:    Are allergic to any medicines    Are pregnant or think you may be pregnant    Are afraid of small, enclosed spaces (claustrophobic)    Have any serious health problems (If you have kidney disease or a liver transplant  you may not be able to have the contrast material used for MRI)    Have had previous surgeries    Are breastfeeding   During your test    You may be asked to wear a hospital gown.    You may be given earplugs to wear if you desire.    You may be injected with contrast through an intravenous (IV) line in your hand or arm. This is a special dye that makes the MRI image sharp that may be needed depending on the reason for your exam.    You ll lie on a platform that slides into a tube-like machine called a scanner. You ll be on your stomach with your breasts placed through openings in the platform.    Remain as still as you can while the camera takes the pictures. This will ensure the best images.  After your test    You can get back to normal activities right away.    If you were given contrast, it will pass naturally through your body within a day.    Drink lots of water so that the dye passes quickly out of your body.  Getting your results  Your healthcare provider will discuss the test results with you during a follow-up appointment or over the phone.  Date Last Reviewed: 2/1/2017 2000-2017 [employer]. [address] [place] [address], [place] [address]. All rights reserved. This information is not intended as a substitute for professional medical care. Always follow your healthcare professional's instructions.      Understanding Breast Density  What is breast  density?  Breasts are made of connective tissue, glandular tissue and fatty tissue. Dense breasts have a lot of the first two types of tissue, but not much fat.  Why is it important?  Having dense breasts means you have a slightly higher risk of getting breast cancer. It also means your mammograms will be harder to read. This is because both dense tissue and lumps look white on a mammogram. Fatty tissue looks black. The pictures below show the 4 levels of breast density:     How do I know if I have dense breasts?  Only a mammogram can tell you if you have dense breasts. The person who reviews your mammogram (radiologist) will give your breasts a density score based on the levels shown above.  What should I do?  Talk to your doctor about your options. But know that mammograms are proven to reduce cancer deaths. Plus, a yearly mammogram is even more important for women who have a higher risk of breast cancer. Your doctor may order other tests as well.  You should also know how your breasts look and feel. Any time you feel or see something that isn't normal, tell your doctor.  For informational purposes only. Not to replace the advice of your health care provider. Images courtesy American College of Radiology (ACR)   and Society of Breast Imaging (SBI). Used with permission. Text copyright   2014 [facility]. All rights reserved. M.Setek [id number] - 08/14.            Follow-ups after your visit        Follow-up notes from your care team     Return for Physical Exam.      Your next 10 appointments already scheduled     Mar 21, 2018  2:30 PM CDT   RETURN GENERAL with Mary Jo Massey MD   Eye Clinic ([facility])    [place] [facility] [address] [place] [address]   [phone]            Mar 22, 2018 12:00 PM CDT   (Arrive by 11:45 AM)   RETURN WITH ROOM with Lola Vasquez, PhD ,  2 114 CONSULT [facility])    [address]  [address]  [facility] [address]   [phone]            Mar 22, 2018  1:15 PM CDT   Masonic Lab Draw with [facility] LAB DRAW   [facility] Lab Draw ([facility])    [address]  [address]  [facility] [place] [address]   [phone]            Apr 05, 2018 12:00 PM CDT   (Arrive by 11:45 AM)   RETURN WITH ROOM with Lola Vasquez PhD LP,  2 114 CONSULT [facility] ([facility])    [address]  [address]  [facility] [address]   [phone]            Apr 19, 2018 12:00 PM CDT   (Arrive by 11:45 AM)   RETURN WITH ROOM with Lola Vasquez PhD LP,  2 114 CONSULT [facility] ([facility])    [address] [place] [address]  [address]  [facility] [address]   [phone]            May 22, 2018 11:15 AM CDT   Masonic Lab Draw with  MASONIC LAB DRAW   [facility] Lab Draw ([facility])    [address]  [address]  [facility] [address]   [phone]            May 22, 2018 11:40 AM CDT   (Arrive by 11:25 AM)   CT CHEST/ABDOMEN/PELVIS W CONTRAST with UC[facility]   [facility] ([facility])    [address] [place] [address]  1st Floor  [facility] [address]   [phone]           Please bring any scans or X-rays taken at other hospitals, if similar tests were done. Also bring a list of your medicines, including vitamins, minerals and over-the-counter drugs. It is safest to leave personal items at home.  Be sure to tell your doctor:   If you have any allergies.   If there s any chance you are pregnant.   If you are breastfeeding.  You may have contrast for this exam. To prepare:   Do not eat or drink for 2 hours before your exam. If you need to take medicine, you may take it with small sips of water. (We may ask you to take liquid medicine as well.)   The day before  your exam, drink extra fluids at least six 8-ounce glasses (unless your doctor tells you to restrict your fluids).   You will be given instructions on how to drink the contrast.  Patients over 70 or patients with diabetes or kidney problems:   If you haven t had a blood test (creatinine test) within the last 30 days, the Cardiologist/Radiologist may require you to get this test prior to your exam.  If you have diabetes:   Continue to take your metformin medication on the day of your exam  Please wear loose clothing, such as a sweat suit or jogging clothes. Avoid snaps, zippers and other metal. We may ask you to undress and put on a hospital gown.  If you have any questions, please call the Imaging Department where you will have your exam.              Future tests that were ordered for you today     Open Future Orders        Priority Expected Expires Ordered    MR Breast Bilateral w/o & w Contrast Routine  3/17/2019 3/16/2018    MA Screen Bilateral w/Micheal Routine 9/18/2018 3/17/2019 3/16/2018            Who to contact     If you have questions or need follow up information about today's clinic visit or your schedule please contact [facility] directly at [phone].  Normal or non-critical lab and imaging results will be communicated to you by MyChart, letter or phone within 4 business days after the clinic has received the results. If you do not hear from us within 7 days, please contact the clinic through [employer] or phone. If you have a critical or abnormal lab result, we will notify you by phone as soon as possible.  Submit refill requests through [employer] or call your pharmacy and they will forward the refill request to us. Please allow 3 business days for your refill to be completed.          Additional Information About Your Visit        [employer] Information     [employer] gives you secure access to your electronic health record. If you see a primary care provider, you can also send messages to  "your care team and make appointments. If you have questions, please call your primary care clinic.  If you do not have a primary care provider, please call [phone] and they will assist you.        Care EveryWhere ID     This is your Care EveryWhere ID. This could be used by other organizations to access your [place] medical records  [id number]        Your Vitals Were     Pulse Temperature Respirations Height Last Period Pulse Oximetry    102 98.9  F (37.2  C) (Tympanic) 18 1.6 m (5' 2.99\") 01/01/2009 97%    BMI (Body Mass Index)                   33.63 kg/m2            Blood Pressure from Last 3 Encounters:   03/16/18 104/70   03/12/18 92/70   03/02/18 (!) 88/59    Weight from Last 3 Encounters:   03/16/18 86.1 kg (189 lb 12.8 oz)   02/26/18 83.8 kg (184 lb 12.8 oz)   12/01/17 85.7 kg (189 lb)                 Today's Medication Changes          These changes are accurate as of 3/16/18  3:44 PM.  If you have any questions, ask your nurse or doctor.               These medicines have changed or have updated prescriptions.        Dose/Directions    metFORMIN 500 MG 24 hr tablet   Commonly known as:  GLUCOPHAGE-XR   This may have changed:    - how much to take  - when to take this  - additional instructions   Used for:  Type 2 diabetes mellitus without complication, without long-term current use of insulin (H)        Take two tabs by mouth once daily with evening meal.   Quantity:  180 tablet   Refills:  PRN                Primary Care Provider Office Phone # Fax #    Morelia Ayon [phone] [address] [facility] [address]        Equal Access to Services     [facility] CenterJULIO : Hadii serena Leach, yessica leigh, qasujatha love . So [facility] [phone].    ATENCIÓN: Si habla español, tiene a martínez disposición servicios gratuitos de asistencia lingüística. Llame al [phone].    We comply with applicable federal civil " rights laws and [place] laws. We do not discriminate on the basis of race, color, national origin, age, disability, sex, sexual orientation, or gender identity.            Thank you!     Thank you for choosing [facility]  for your care. Our goal is always to provide you with excellent care. Hearing back from our patients is one way we can continue to improve our services. Please take a few minutes to complete the written survey that you may receive in the mail after your visit with us. Thank you!             Your Updated Medication List - Protect others around you: Learn how to safely use, store and throw away your medicines at www.disposemymeds.org.          This list is accurate as of 3/16/18  3:44 PM.  Always use your most recent med list.                   Brand Name Dispense Instructions for use Diagnosis    atorvastatin 80 MG tablet    LIPITOR    90 tablet    Take 1 tablet (80 mg) by mouth daily    Hyperlipidemia LDL goal <100       blood glucose lancets standard    no brand specified    100 each    Use to test blood sugar 2 times daily or as directed.    Type 2 diabetes mellitus without complication, without long-term current use of insulin (H)       blood glucose monitoring meter device kit    no brand specified    1 kit    Use to test blood sugar 2 times daily or as directed.    Type 2 diabetes mellitus without complication, without long-term current use of insulin (H)       blood glucose monitoring test strip    no brand specified    100 strip    Use to test blood sugars 2 times daily or as directed    Type 2 diabetes mellitus without complication, without long-term current use of insulin (H)       CALCIUM-MAGNESIUM-VITAMIN D PO      Take 2 tablets by mouth daily        cephALEXin 500 MG capsule    KEFLEX    21 capsule    Take 1 capsule (500 mg) by mouth 3 times daily    Infected sebaceous cyst of skin       EPINEPHrine 0.3 MG/0.3ML injection 2-pack    EPIPEN/ADRENACLICK/or ANY BX  "GENERIC EQUIV    1 each    Inject 0.3 mLs (0.3 mg) into the muscle once as needed for anaphylaxis    Bee allergy status       escitalopram 20 MG tablet    LEXAPRO    30 tablet    Take 1 tablet (20 mg) by mouth daily    Major depressive disorder, recurrent episode, mild (H)       fluticasone 50 MCG/ACT spray    FLONASE    48 mL    SHAKE WELL AND USE 2 SPRAYS IN EACH NOSTRIL DAILY    Chronic maxillary sinusitis       lisinopril 10 MG tablet    PRINIVIL/ZESTRIL    90 tablet    Take 1 tablet (10 mg) by mouth daily    Essential hypertension       loratadine 10 MG tablet    CLARITIN    30 tablet    Take 1 tablet (10 mg) by mouth daily    Pain in joint, multiple sites       LORazepam 1 MG tablet    ATIVAN    30 tablet    Take 1 tablet (1 mg) by mouth every 6 hours as needed (nausea/vomiting, anxiety or sleep)    Ovarian cancer, left (H), Encounter for long-term (current) use of medications       metFORMIN 500 MG 24 hr tablet    GLUCOPHAGE-XR    180 tablet    Take two tabs by mouth once daily with evening meal.    Type 2 diabetes mellitus without complication, without long-term current use of insulin (H)       MULTIVITAMIN ADULT PO      Take 1 tablet by mouth daily        order for DME     1 Units    Equipment being ordered: blood pressure monitor    Essential hypertension       * order for DME     1 each    Compression stockings 20-30 mm Hg. To be wear when directed by Hematology team and while awake for the first two years post clot.    Long-term (current) use of anticoagulants, Acute deep vein thrombosis (DVT) of left lower extremity, unspecified vein (H)       * order for DME     1 Bottle    Plain packing strip 1/4\"    Abscess of leg       * order for DME     1 Box    Sterile zoey tipped applicators    Abscess of leg       * order for DME     3 Bottle    USE 1/4 INCH  WIDTH PLAIN NU GAUZE PACKING TO DO SELF WOUND CARE OF LEFT LOWER LEG ONCE DAILY. DIMENSIONS OF WOUND PRESENTLY ARE 2 X 2 INCH AND APPROXIMATELY 1/2 INCH " DEEP.  USES 6 INCHES OF PACKING CURRENTLY FOR DRESSING CHANGE.  FAX TO [facility] -[phone]    Wound of left leg       * order for DME     30 pad    STERILE ADHESIVE PAD BANDAGE AT LEAST 4X4 INCH IN SIZE NEEDED FOR DAILY WOUND CARE TO LEFT LOWER LEG. FAX TO [facility] [phone]    Leg wound, left       * order for DME     1 Units    Home blood pressure monitor    Type 2 diabetes mellitus without complication, without long-term current use of insulin (H)       PRO-BIOTIC BLEND PO      Take 1 capsule by mouth daily Enzymatic Therapy-probiotic pearls        rivaroxaban ANTICOAGULANT 20 MG Tabs tablet    XARELTO    30 tablet    Take 1 tablet (20 mg) by mouth daily (with dinner)    Long-term (current) use of anticoagulants, Acute deep vein thrombosis (DVT) of left lower extremity, unspecified vein (H)       traZODone 50 MG tablet    DESYREL    180 tablet    TAKE 1 TO 2 TABLETS(50  MG) BY MOUTH EVERY NIGHT AS NEEDED FOR SLEEP    Insomnia, unspecified type       VITAMIN B-COMPLEX PO           vitamin D 1000 UNITS capsule      Take 2,000 Units by mouth daily        * Notice:  This list has 6 medication(s) that are the same as other medications prescribed for you. Read the directions carefully, and ask your doctor or other care provider to review them with you.

## 2018-03-16 NOTE — PROGRESS NOTES
Social Work Follow-Up Encounter Visit  Oncology Clinic    Data/Intervention:  Patient Name:  Hafsa Corona  /Age:  1955 (63 year old)    Reason for Follow-Up:  Patient requested to meet with social work.    Collaborated With:    -Patient     Social work met with patient in exam room.  Patient requested assistance with her medical bills as she stated that she is behind on payments for past cancer treatment at [facility] in addition to [facility].  SW encouraged patient to contact [facility] regarding bills from their institution.  SW explained that very limited resources are available for medical bills and recommended she speak to the billing departments for [facility] and [facility] regarding payment plan options in addition to being screened for [facility].  SW provided contact information for billing departments.  Patient reported understanding and intent to follow up. No other needs reported.    Resources Provided:  [facility] billing  [facility] billing  [facility]     Assessment:  Patient planning for needs appropriately.    Plan:  SW provided patient/family with writer's contact information and availability.   SW continues to be available to help with needs.     Soo Yeon Han, MSW, [facility] Acad[facility] HospitalSW  Pager: [phone]  Phone: [phone]

## 2018-03-16 NOTE — NURSING NOTE
"Oncology Rooming Note    March 16, 2018 1:48 PM   Hafsa Corona is a 63 year old female who presents for:    Chief Complaint   Patient presents with     Family History Of Cancer     breast cancer in mother, father and paternal uncle with pancreatic cancer     Oncology Clinic Visit     New patient visit Related to Malignant neoplasm of ovary,family hx of malignant neoplasm of breast family history of pancreatic cancer, right renal     Initial Vitals: /70 (BP Location: Right arm, Patient Position: Sitting, Cuff Size: Adult Regular)  Pulse 102  Temp 98.9  F (37.2  C) (Tympanic)  Resp 18  Ht 1.6 m (5' 2.99\")  Wt 86.1 kg (189 lb 12.8 oz)  LMP 01/01/2009  SpO2 97%  BMI 33.63 kg/m2 Estimated body mass index is 33.63 kg/(m^2) as calculated from the following:    Height as of this encounter: 1.6 m (5' 2.99\").    Weight as of this encounter: 86.1 kg (189 lb 12.8 oz). Body surface area is 1.96 meters squared.  No Pain (0) Comment: Data Unavailable   Patient's last menstrual period was 01/01/2009.  Allergies reviewed: Yes  Medications reviewed: Yes    Medications: Medication refills not needed today.  Pharmacy name entered into [facility]:    [id number] - [place], [place] - [address] AT [facility] [address] [facility] - [place], [place] - [address] [facility] [place], [place] - [address] [facility] [phone]    Clinical concerns: No new concerns. Provider was notified.    10 minutes for nursing intake (face to face time)     Virginia Hamilton LPN            "

## 2018-03-16 NOTE — LETTER
3/16/2018       RE: Hafsa Corona  [address]  [place] [address]     Dear Colleague,    Thank you for referring your patient, Hafsa Corona, to the [facility]. Please see a copy of my visit note below.    Oncology Risk Management Consultation:  Date on this visit: 3/16/2018    Hafsa Corona  is referred by Mariam Sams, Licensed Genetic Counselor,  for an oncology risk management consultation. She requires evaluation for her risk of cancer secondary to having a family history of breast cancer in her mother at age 63. She is considered to at high risk for breast cancer and has a 223.5% lifetime risk for breast cancer by the SHERON model.     Primary Physician: Morelia Ayon NP    History Of Present Illness:  Ms. Corona is a very pleasant 63 year old female who presents with family history of breast cancer.    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 Three month surveillance plan with [facility].  Nulliparous.  Menarche at 10.  Hx of OCPs x6 years.  No hx of HRT.  Genetic testin2017 - Negative for mutations in the AIP, ALK, APC, COSTA, BAP1, BARD1, BLM, BRCA1, BRCA2, BRIP1, BMPR1A, CDH1, CDK4, CDKN1B, CDKN2A, CHEK2, DICER1, EPCAM, FANCC, FH, FLCN, GALNT12, GREM1, HOXB13, MAX, MEN1, MET, MITF, MLH1, MRE11A, MSH2, MSH6, MUTYH, NBN, NF1, NF2, PALB2, PHOX2B, PMS2, POLD1, POLE, POT1, NJFTO8X, PTCH1, PTEN, RAD50, RAD51C, RAD51D, RB1, RET, SDHA, SDHAF2, SDHB, SDHC, SDHD, SMAD4, SMARCA4, SMARCB1, SMARCE1, STK11, SUFU, FFXI213, TP53, TSC1, TSC2, VHL, and XRCC2 genes using a Cancer-Next Expanded panel through [employer].    Pertinent screening history:  2011 - Screening mammogram, normal by report   2014 - Screening mammogram,  normal by report  9/15/2017 - Screening mammogram, BiRads1.     At this visit,  she reports that she doesn't check her breasts often, but that she has no concerns about breast health, and has not noted any asymmetry, lumps, masses, thickenings, nipple discharge or nipple inversion or changes in skin of her breasts.    Past Medical/Surgical History:  Past Medical History:   Diagnosis Date     Anxiety state, unspecified     [id number]     Arthritis 2014    vague, gradual, not treated really, knees feel it     Attention deficit disorder without mention of hyperactivity      Cancer (H) 7/2017 and 1/2018    ovarian and kidney cancers, both treated well     Chronic rhinitis      Depressive disorder lifetime    plus Anxiety plus ADD. Escitalipram, therapy, ADD meds maybe     Depressive disorder, not elsewhere classified      Heart disease 1999    tachycardia and high cholesterol, managed     History of blood transfusion 7/2017    while in hospital for ovarian cancer     Hypertension 1999    tho BP was too LOW last week. past 12 years Generally OK     Panic disorder without agoraphobia      Pure hypercholesterolemia     Lipitor     Tachycardia, unspecified     9/1999-2/2004     Type II or unspecified type diabetes mellitus without mention of complication, not stated as uncontrolled     diagnosed 2004     Past Surgical History:   Procedure Laterality Date     BIOPSY  7/2017 and 2/2018    ovarian and kidney cancer biopsies. also 1987 breast benign     BREAST SURGERY  1987    date unsure. benign breast cyst removed     COLONOSCOPY  2008 and 2013    polyps removed. Next due fall 2018     HYSTERECTOMY TOTAL ABDOMINAL, BILATERAL SALPINGO-OOPHORECTOMY, NODE DISSECTION, COMBINED Left 7/7/2017    Procedure: COMBINED HYSTERECTOMY TOTAL ABDOMINAL, SALPINGO-OOPHORECTOMY, NODE DISSECTION;  Exploratory Laparotomy, Left Salpingo-Oophorectomy, Cancer Staging, Total Hysterectomy, Omentectomy, Evacuation of abdominal fluid, Lymph Node  Dissection  Anesthesia Block ;  Surgeon: Serena Cole MD;  Location: [facility] OR     HYSTERECTOMY, PAP NO LONGER INDICATED  07/07/2017    Laparotomy; for ovarian cancer staging     INSERT PORT VASCULAR ACCESS Right 8/21/2017    Procedure: INSERT PORT VASCULAR ACCESS;  Single Lumen Chest Power Port;  Surgeon: Stephen Mike PA-C;  Location: [facility] OR     LYMPHADENECTOMY RETROPERITONEAL Bilateral 07/07/2017    Laparotomy; pelvics & para-aortics; for ovarian cancer staging     OMENTECTOMY  07/07/2017    Laparotomy; for ovarian cancer staging     ORTHOPEDIC SURGERY  1/2002    broken left jaw due to fall, 4 fractures, titanium plates     SALPINGO OOPHORECTOMY,R/L/KAYY Right 2008    Salpingo Oophorectomy, RT     SALPINGO OOPHORECTOMY,R/L/KAYY Left 07/07/2017    Laparotomy; for ovarian cancer staging     SURGICAL HISTORY OF -       facial surgery d/t fall in 1/2002     SURGICAL HISTORY OF -       1985 removal of breast cyst     SURGICAL HISTORY OF -   2008    endometrial ablation       Allergies:  Allergies as of 03/16/2018 - Trevin as Reviewed 03/16/2018   Allergen Reaction Noted     Wasps [hornets] Anaphylaxis 09/03/2009     No clinical screening - see comments  11/01/2017     Paclitaxel Difficulty breathing 08/30/2017     Sulfa drugs Hives 07/19/2005       Current Medications:  Current Outpatient Prescriptions   Medication Sig Dispense Refill     cephALEXin (KEFLEX) 500 MG capsule Take 1 capsule (500 mg) by mouth 3 times daily 21 capsule 0     B Complex Vitamins (VITAMIN B-COMPLEX PO)        blood glucose (NO BRAND SPECIFIED) lancets standard Use to test blood sugar 2 times daily or as directed. 100 each 11     blood glucose monitoring (NO BRAND SPECIFIED) meter device kit Use to test blood sugar 2 times daily or as directed. 1 kit 0     blood glucose monitoring (NO BRAND SPECIFIED) test strip Use to test blood sugars 2 times daily or as directed 100 strip PRN     fluticasone (FLONASE) 50 MCG/ACT spray SHAKE WELL  "AND USE 2 SPRAYS IN EACH NOSTRIL DAILY 48 mL PRN     LORazepam (ATIVAN) 1 MG tablet Take 1 tablet (1 mg) by mouth every 6 hours as needed (nausea/vomiting, anxiety or sleep) 30 tablet 1     loratadine (CLARITIN) 10 MG tablet Take 1 tablet (10 mg) by mouth daily 30 tablet 1     rivaroxaban ANTICOAGULANT (XARELTO) 20 MG TABS tablet Take 1 tablet (20 mg) by mouth daily (with dinner) 30 tablet 11     traZODone (DESYREL) 50 MG tablet TAKE 1 TO 2 TABLETS(50  MG) BY MOUTH EVERY NIGHT AS NEEDED FOR SLEEP 180 tablet 1     Multiple Vitamins-Minerals (MULTIVITAMIN ADULT PO) Take 1 tablet by mouth daily       CALCIUM-MAGNESIUM-VITAMIN D PO Take 2 tablets by mouth daily       Probiotic Product (PRO-BIOTIC BLEND PO) Take 1 capsule by mouth daily Enzymatic Therapy-probiotic pearls       atorvastatin (LIPITOR) 80 MG tablet Take 1 tablet (80 mg) by mouth daily 90 tablet PRN     order for DME Equipment being ordered: blood pressure monitor 1 Units 0     lisinopril (PRINIVIL/ZESTRIL) 10 MG tablet Take 1 tablet (10 mg) by mouth daily 90 tablet PRN     escitalopram (LEXAPRO) 20 MG tablet Take 1 tablet (20 mg) by mouth daily 30 tablet PRN     Cholecalciferol (VITAMIN D) 1000 UNIT capsule Take 2,000 Units by mouth daily        order for DME Home blood pressure monitor (Patient not taking: Reported on 3/16/2018) 1 Units 0     order for DME STERILE ADHESIVE PAD BANDAGE AT LEAST 4X4 INCH IN SIZE NEEDED FOR DAILY WOUND CARE TO LEFT LOWER LEG.  FAX TO [employer] -[phone] (Patient not taking: Reported on 3/16/2018) 30 pad 1     order for DME USE 1/4 INCH  WIDTH PLAIN NU GAUZE PACKING TO DO SELF WOUND CARE OF LEFT LOWER LEG ONCE DAILY.  DIMENSIONS OF WOUND PRESENTLY ARE 2 X 2 INCH AND APPROXIMATELY 1/2 INCH DEEP.  USES 6 INCHES OF PACKING CURRENTLY FOR DRESSING CHANGE.    FAX TO [employer] -[phone] (Patient not taking: Reported on 3/12/2018) 3 Bottle 2     order for DME Plain packing strip 1/4\" (Patient not taking: " Reported on 3/16/2018) 1 Bottle PRN     order for DME Sterile zoey tipped applicators (Patient not taking: Reported on 3/12/2018) 1 Box PRN     order for DME Compression stockings 20-30 mm Hg. To be wear when directed by Hematology team and while awake for the first two years post clot. (Patient not taking: Reported on 3/16/2018) 1 each 0     metFORMIN (GLUCOPHAGE-XR) 500 MG 24 hr tablet Take two tabs by mouth once daily with evening meal. (Patient taking differently: 1,000 mg daily (with dinner) Take two tabs by mouth once daily with evening meal.) 180 tablet PRN     EPINEPHrine (EPIPEN) 0.3 MG/0.3ML injection Inject 0.3 mLs (0.3 mg) into the muscle once as needed for anaphylaxis (Patient not taking: Reported on 3/16/2018) 1 each PRN        Family History:  Family History   Problem Relation Age of Onset     C.A.D. Father      DIABETES Father      Later in his life, in his 70s     Hypertension Father      CEREBROVASCULAR DISEASE Father      1984 or      Psychotic Disorder Father      Pancreatic Cancer Father      Coronary Artery Disease Father      diagnosed in his late 50s (age)     Hyperlipidemia Father      treated w statins     Other Cancer Father      pancreatic, ,  of this     Depression Father      MENTAL ILLNESS Father      likely PTSD and depression     Obesity Father      C.A.D. Mother      Hypertension Mother      Breast Cancer Mother      2 times,  and ?     Psychotic Disorder Mother      Colon Cancer Mother      ?     CANCER Mother      Bone cancer     Coronary Artery Disease Mother      diagnosed in her late 70s (age)     Hyperlipidemia Mother      treated w. statins     Other Cancer Mother      bone/marrow, ,  of this     Depression Mother      Anxiety Disorder Mother      MENTAL ILLNESS Mother      various undiagnosed types, but THERE     Obesity Mother      Prostate Problems Brother      cleared      Hypertension Brother      Hyperlipidemia Brother      unsure if  treated w statins     Depression Brother      Anxiety Disorder Brother      MENTAL ILLNESS Brother      undiagnosed but THERE     Obesity Brother      Psychotic Disorder Sister      x2     Neurologic Disorder Sister      Hypertension Sister      Hyperlipidemia Sister      treated w statins     Depression Sister      Anxiety Disorder Sister      Obesity Sister      Psychotic Disorder Brother      x2     Depression Brother      major depressive disorder and OCD     Anxiety Disorder Brother      MENTAL ILLNESS Brother      not sure of diagnoses, treated     Hypertension Brother      Hyperlipidemia Brother      Psychotic Disorder Maternal Grandmother      ?     Coronary Artery Disease Maternal Grandmother       of heart attack age 84?     Depression Maternal Grandmother      Psychotic Disorder Maternal Grandfather      ?     Psychotic Disorder Paternal Grandmother      Schizophernia     Coronary Artery Disease Paternal Grandmother       of heart attack, age 85?     Depression Paternal Grandmother      severe, but recovered     MENTAL ILLNESS Paternal Grandmother      possible bipolar or other     Psychotic Disorder Paternal Grandfather      ?     Respiratory Paternal Grandfather       of emphysema and smoking     Psychotic Disorder Sister      Other - See Comments Sister      small kidney stone      Hypertension Sister      Hyperlipidemia Sister      treated w statins     Depression Sister      Anxiety Disorder Sister      Cancer - colorectal No family hx of        Social History:  Social History     Social History     Marital status: Single     Spouse name: N/A     Number of children: N/A     Years of education: N/A     Occupational History      Self Employed.     Social History Main Topics     Smoking status: Never Smoker     Smokeless tobacco: Never Used     Alcohol use Yes      Comment: Very infrequent, a bit of wine or beer 2x per month maybe     Drug use: Yes     Special: Marijuana      Comment:  "infrequent, for celebrations only, maybe 8x per year     Sexual activity: Yes     Partners: Male     Birth control/ protection: None      Comment: long-time      Other Topics Concern     Parent/Sibling W/ Cabg, Mi Or Angioplasty Before 65f 55m? No     Social History Narrative     Physical Exam:  /70 (BP Location: Right arm, Patient Position: Sitting, Cuff Size: Adult Regular)  Pulse 102  Temp 98.9  F (37.2  C) (Tympanic)  Resp 18  Ht 1.6 m (5' 2.99\")  Wt 86.1 kg (189 lb 12.8 oz)  LMP 01/01/2009  SpO2 97%  BMI 33.63 kg/m2    GENERAL APPEARANCE: healthy, alert and no distress     NECK: no adenopathy, no asymmetry or masses     LYMPHATICS: No cervical, supraclavicular or axillary lymphadenopathy     RESP: lungs clear to auscultation - no rales, rhonchi or wheezes     BREAST: A multipositional, bilateral breast exam was performed.  Very symmetrical, pendulous breasts. Right breast: no palpable dominant masses, no nipple discharge, no skin changes. Soft; fibrocystic changes throughout anterior portion of breast tissue.  Right axilla: no palpable adenopathy. Left breast: no palpable dominant masses, no nipple discharge, no skin changes. Left axilla: no palpable adenopathy. Soft, fibrocystic changes in UOQ of  tissue.   CARDIOVASCULAR: regular rate and rhythm, normal S1 S2, no S3 or S4 and no murmur.        SKIN: No suspicious lesions or rashes    Laboratory/Imaging Studies  Results for orders placed or performed in visit on 02/26/18      Result Value Ref Range     14 0 - 30 U/mL       ASSESSMENT  We reviewed the surveillance protocol for breast cancer and discussed signs and symptoms to be watchful for in between surveillance visits. She verbalized understanding of signs and symptoms to address with her physicians including lumps, thickening, swelling, tenderness, nipple discharge or changes in skin of breasts. She is currently having active surveillance for her ovarian cancer risk with " the Gyn-Onc clinic and is following with Dr. Dick regarding her kidney cancer. We discussed concerns about the use of contrast dye, as she has only one kidney. I will consult with Dr. Gregorio and Dr. Dick for their recommendations on this. If she is not cleared for using gadolinium, she should continue to have annual tomosynthesis mammograms and annual clinical breast exams with her primary care provider.    We also discussed following  a healthy lifestyle plan recommended by both NCCN and the American Cancer Society that can reduce the risk of breast cancer:  1 Limit alcohol consumption to less than 1 drink per day (1 drink=5 oz.wine, 12 oz. Beer or 1.5 oz. 80-proof liquor).  2. Exercise per American Cancer Society guidelines of at least 150 minutes of moderate-intensity activity or 75 minutes of vigorous activity each week. (Or a combination of both.) Exercise should be spread  out over the week. Regular recreational exercise has been shown to reduce the risk of cancer by 30%.   3. Maintain a healthy weight with a Body Mass Index between 19-24.9. A postmenopausal BMI of 30.7 has been shown to have increased the risk for breast cancer by 37% in some studies.  4. Do not use tobacco products and limit exposure to passive smoke.    INDIVIDUALIZED CANCER RISK MANAGEMENT PLAN:  Individualized Surveillance Plan for women  With 20% or greater lifetime risk of breast cancer   Per NCCN Breast Cancer Screening and Diagnosis Guidelines Version 1.2017    Recommended screening Test or procedure Last done Next Scheduled    Clinical encounter Ongoing risk assessment, risk reduction counseling and clinical breast exam, every 6-12 months.   3/16/2018   Sept. 2018   However, some family histories with breast cancers at a very young age, may warrant screening starting earlier.    *May begin at age 40 if breast cancers in the family occur at later ages.    Annual mammogram beginning 10 years younger than the earliest breast cancer in  the family but not prior to age 30.    Recommend annual breast MRI to begin 10 years younger than the earliest breast cancer in the family but not prior to age 25.    Breast MRIs are preferably done on day 7-15 of the menstrual cycle in premenopausal women. 9/15/2017 - Screening mammogram, BiRads1 (normal) NEXT: Breast MRI soon, if cleared by urologist    Next exam: Sept. Followed by tomosynthesis mammogram   Breast screening for patients at high risk due to thoracic radiation between the ages of 10-30     Begin 8-10 years post radiation treatment or age 25.   Annual mammogram   and  Annual breast MRI, preferably on day 7-15 of menstrual cycle for premenopausal women.    Annual clinical breast exams with ongoing risk assessment and risk reduction counseling until age 25, then every 6-12 months.     NA     NA       I spent 60 minutes with the patient with greater that 50% of it in counseling and coordinating care as documented above.    Stephie Karimi, MARLON-CNS, OCN, ANG-BC  Clinical Nurse Specialist  Cancer Risk Management Program  [address]  [place] [address]    phone:  [phone]  Pager: [phone]  fax: [phone]    Cc: Morelia Ayon NP

## 2018-03-16 NOTE — MR AVS SNAPSHOT
After Visit Summary   3/16/2018    Hafsa Corona    MRN: [id number]           Patient Information     Date Of Birth          1955        Visit Information        Provider Department      3/16/2018 5:57 PM Han, Soo Yeon, MSW [facility]        Today's Diagnoses     Visit for counseling    -  1       Follow-ups after your visit        Your next 10 appointments already scheduled     Mar 21, 2018  2:30 PM CDT   RETURN GENERAL with Mary Jo Massey MD   Eye Clinic ([facility])    [address]  [facility] [address]   [phone]            Mar 22, 2018 12:00 PM CDT   (Arrive by 11:45 AM)   RETURN WITH ROOM with Lola Vasquez, PhD BENJAMIN,  2 114 CONSULT [facility] ([facility])    [address] [place] [address]  [address]  [facility] [address]   [phone]            Mar 22, 2018  1:15 PM CDT   [facility] Draw with [facility] DRAW   [facility] ([facility])    [address] [place] [address]  [address]  [facility] [address]   [phone]            Apr 05, 2018 12:00 PM CDT   (Arrive by 11:45 AM)   RETURN WITH ROOM with Lola Vasquez, PhD ALEXANDER,  2 114 CONSULT [facility] ([facility])    [address] [place] [address]  [address]  [facility] [address]   [phone]            Apr 19, 2018 12:00 PM CDT   (Arrive by 11:45 AM)   RETURN WITH ROOM with Lola Vasquez, PhD ALEXANDER,  2 114 CONSULT [facility] ([facility])    [address] [place] [address]  [address]  [facility] [address]   [phone]            May 22, 2018 11:15 AM CDT   [facility] Lab Draw with [facility] DRAW   [facility] Draw ([facility])    [address] [place] [address]  [address]  [facility] [address]   [phone]            May 22, 2018 11:40 AM CDT   (Arrive by  11:25 AM)   CT CHEST/ABDOMEN/PELVIS W CONTRAST with UCCT2   [facility] CT ([facility])    [address]  1st [address]  [facility] [address] [phone]           Please bring any scans or X-rays taken at other hospitals, if similar tests were done. Also bring a list of your medicines, including vitamins, minerals and over-the-counter drugs. It is safest to leave personal items at home.  Be sure to tell your doctor:   If you have any allergies.   If there s any chance you are pregnant.   If you are breastfeeding.  You may have contrast for this exam. To prepare:   Do not eat or drink for 2 hours before your exam. If you need to take medicine, you may take it with small sips of water. (We may ask you to take liquid medicine as well.)   The day before your exam, drink extra fluids at least six 8-ounce glasses (unless your doctor tells you to restrict your fluids).   You will be given instructions on how to drink the contrast.  Patients over 70 or patients with diabetes or kidney problems:   If you haven t had a blood test (creatinine test) within the last 30 days, the Cardiologist/Radiologist may require you to get this test prior to your exam.  If you have diabetes:   Continue to take your metformin medication on the day of your exam  Please wear loose clothing, such as a sweat suit or jogging clothes. Avoid snaps, zippers and other metal. We may ask you to undress and put on a hospital gown.  If you have any questions, please call the Imaging Department where you will have your exam.              Future tests that were ordered for you today     Open Future Orders        Priority Expected Expires Ordered    MR Breast Bilateral w/o & w Contrast Routine  3/17/2019 3/16/2018    MA Screen Bilateral w/Micheal Routine 9/18/2018 3/17/2019 3/16/2018            Who to contact     If you have questions or need follow up information about today's clinic visit or your schedule please  contact [facility] directly at [phone].  Normal or non-critical lab and imaging results will be communicated to you by MyChart, letter or phone within 4 business days after the clinic has received the results. If you do not hear from us within 7 days, please contact the clinic through [employer] or phone. If you have a critical or abnormal lab result, we will notify you by phone as soon as possible.  Submit refill requests through [employer] or call your pharmacy and they will forward the refill request to us. Please allow 3 business days for your refill to be completed.          Additional Information About Your Visit        [employer] Medical Information     [employer] gives you secure access to your electronic health record. If you see a primary care provider, you can also send messages to your care team and make appointments. If you have questions, please call your primary care clinic.  If you do not have a primary care provider, please call [phone] and they will assist you.        Care EveryWhere ID     This is your Care EveryWhere ID. This could be used by other organizations to access your [place] medical records  [id number]        Your Vitals Were     Last Period                   01/01/2009            Blood Pressure from Last 3 Encounters:   03/16/18 104/70   03/12/18 92/70   03/02/18 (!) 88/59    Weight from Last 3 Encounters:   03/16/18 86.1 kg (189 lb 12.8 oz)   02/26/18 83.8 kg (184 lb 12.8 oz)   12/01/17 85.7 kg (189 lb)              Today, you had the following     No orders found for display         Today's Medication Changes          These changes are accurate as of 3/16/18  6:01 PM.  If you have any questions, ask your nurse or doctor.               These medicines have changed or have updated prescriptions.        Dose/Directions    metFORMIN 500 MG 24 hr tablet   Commonly known as:  GLUCOPHAGE-XR   This may have changed:    - how much to take  - when to take this  - additional  instructions   Used for:  Type 2 diabetes mellitus without complication, without long-term current use of insulin (H)        Take two tabs by mouth once daily with evening meal.   Quantity:  180 tablet   Refills:  PRN                Primary Care Provider Office Phone # Fax #    [place] HERRERA ATTILA Ayon [phone] [place]  [facility] [address]        Equal Access to Services     [facility] CenterJULIO : Hadii aad ku hadasho Soomaali, waaxda luqadaha, qaybta kaalmada adeegyada, waxay idiin hayaan adeeg kharash la'aan . So [facility] [phone].    ATENCIÓN: Si habla español, tiene a martínez disposición servicios gratuitos de asistencia lingüística. Llame al [phone].    We comply with applicable federal civil rights laws and [place] laws. We do not discriminate on the basis of race, color, national origin, age, disability, sex, sexual orientation, or gender identity.            Thank you!     Thank you for choosing [facility]  for your care. Our goal is always to provide you with excellent care. Hearing back from our patients is one way we can continue to improve our services. Please take a few minutes to complete the written survey that you may receive in the mail after your visit with us. Thank you!             Your Updated Medication List - Protect others around you: Learn how to safely use, store and throw away your medicines at www.disposemymeds.org.          This list is accurate as of 3/16/18  6:01 PM.  Always use your most recent med list.                   Brand Name Dispense Instructions for use Diagnosis    atorvastatin 80 MG tablet    LIPITOR    90 tablet    Take 1 tablet (80 mg) by mouth daily    Hyperlipidemia LDL goal <100       blood glucose lancets standard    no brand specified    100 each    Use to test blood sugar 2 times daily or as directed.    Type 2 diabetes mellitus without complication, without long-term current use of insulin (H)       blood glucose  monitoring meter device kit    no brand specified    1 kit    Use to test blood sugar 2 times daily or as directed.    Type 2 diabetes mellitus without complication, without long-term current use of insulin (H)       blood glucose monitoring test strip    no brand specified    100 strip    Use to test blood sugars 2 times daily or as directed    Type 2 diabetes mellitus without complication, without long-term current use of insulin (H)       CALCIUM-MAGNESIUM-VITAMIN D PO      Take 2 tablets by mouth daily        cephALEXin 500 MG capsule    KEFLEX    21 capsule    Take 1 capsule (500 mg) by mouth 3 times daily    Infected sebaceous cyst of skin       EPINEPHrine 0.3 MG/0.3ML injection 2-pack    EPIPEN/ADRENACLICK/or ANY BX GENERIC EQUIV    1 each    Inject 0.3 mLs (0.3 mg) into the muscle once as needed for anaphylaxis    Bee allergy status       escitalopram 20 MG tablet    LEXAPRO    30 tablet    Take 1 tablet (20 mg) by mouth daily    Major depressive disorder, recurrent episode, mild (H)       fluticasone 50 MCG/ACT spray    FLONASE    48 mL    SHAKE WELL AND USE 2 SPRAYS IN EACH NOSTRIL DAILY    Chronic maxillary sinusitis       lisinopril 10 MG tablet    PRINIVIL/ZESTRIL    90 tablet    Take 1 tablet (10 mg) by mouth daily    Essential hypertension       loratadine 10 MG tablet    CLARITIN    30 tablet    Take 1 tablet (10 mg) by mouth daily    Pain in joint, multiple sites       LORazepam 1 MG tablet    ATIVAN    30 tablet    Take 1 tablet (1 mg) by mouth every 6 hours as needed (nausea/vomiting, anxiety or sleep)    Ovarian cancer, left (H), Encounter for long-term (current) use of medications       metFORMIN 500 MG 24 hr tablet    GLUCOPHAGE-XR    180 tablet    Take two tabs by mouth once daily with evening meal.    Type 2 diabetes mellitus without complication, without long-term current use of insulin (H)       MULTIVITAMIN ADULT PO      Take 1 tablet by mouth daily        order for DME     1 Units     "Equipment being ordered: blood pressure monitor    Essential hypertension       * order for DME     1 each    Compression stockings 20-30 mm Hg. To be wear when directed by Hematology team and while awake for the first two years post clot.    Long-term (current) use of anticoagulants, Acute deep vein thrombosis (DVT) of left lower extremity, unspecified vein (H)       * order for DME     1 Bottle    Plain packing strip 1/4\"    Abscess of leg       * order for DME     1 Box    Sterile zoey tipped applicators    Abscess of leg       * order for DME     3 Bottle    USE 1/4 INCH  WIDTH PLAIN NU GAUZE PACKING TO DO SELF WOUND CARE OF LEFT LOWER LEG ONCE DAILY. DIMENSIONS OF WOUND PRESENTLY ARE 2 X 2 INCH AND APPROXIMATELY 1/2 INCH DEEP.  USES 6 INCHES OF PACKING CURRENTLY FOR DRESSING CHANGE.  FAX TO [employer] [phone]    Wound of left leg       * order for DME     30 pad    STERILE ADHESIVE PAD BANDAGE AT LEAST 4X4 INCH IN SIZE NEEDED FOR DAILY WOUND CARE TO LEFT LOWER LEG. FAX TO [employer] [phone]    Leg wound, left       * order for DME     1 Units    Home blood pressure monitor    Type 2 diabetes mellitus without complication, without long-term current use of insulin (H)       PRO-BIOTIC BLEND PO      Take 1 capsule by mouth daily Enzymatic Therapy-probiotic pearls        rivaroxaban ANTICOAGULANT 20 MG Tabs tablet    XARELTO    30 tablet    Take 1 tablet (20 mg) by mouth daily (with dinner)    Long-term (current) use of anticoagulants, Acute deep vein thrombosis (DVT) of left lower extremity, unspecified vein (H)       traZODone 50 MG tablet    DESYREL    180 tablet    TAKE 1 TO 2 TABLETS(50  MG) BY MOUTH EVERY NIGHT AS NEEDED FOR SLEEP    Insomnia, unspecified type       VITAMIN B-COMPLEX PO           vitamin D 1000 UNITS capsule      Take 2,000 Units by mouth daily        * Notice:  This list has 6 medication(s) that are the same as other medications prescribed for you. Read the " directions carefully, and ask your doctor or other care provider to review them with you.

## 2018-03-20 NOTE — PROGRESS NOTES
I spoke with Dr. Dick's  at [facility] to ask whether it was advisable for Hafsa to have an annual breast MRI, in light of the fact that she has one kidney and a history of kidney cancer.    He said that it was fine as long as her eGFR is >30. Her last was 11/14/2018 and it was 83, so we will proceed with the breast MRI.

## 2018-03-21 ENCOUNTER — OFFICE VISIT (OUTPATIENT)
Dept: OPHTHALMOLOGY | Facility: CLINIC | Age: 63
End: 2018-03-21
Attending: OPHTHALMOLOGY
Payer: COMMERCIAL

## 2018-03-21 DIAGNOSIS — H52.203 HYPEROPIC ASTIGMATISM OF BOTH EYES: ICD-10-CM

## 2018-03-21 DIAGNOSIS — H52.4 PRESBYOPIA: ICD-10-CM

## 2018-03-21 DIAGNOSIS — H25.13 NUCLEAR SCLEROTIC CATARACT OF BOTH EYES: Primary | ICD-10-CM

## 2018-03-21 DIAGNOSIS — Z90.5 HISTORY OF NEPHRECTOMY: Primary | ICD-10-CM

## 2018-03-21 PROCEDURE — 92015 DETERMINE REFRACTIVE STATE: CPT | Mod: ZF

## 2018-03-21 PROCEDURE — G0463 HOSPITAL OUTPT CLINIC VISIT: HCPCS | Mod: ZF

## 2018-03-21 ASSESSMENT — VISUAL ACUITY
OD_CC: 20/30
OS_CC: E @ 5'
CORRECTION_TYPE: GLASSES
METHOD: SNELLEN - LINEAR

## 2018-03-21 ASSESSMENT — SLIT LAMP EXAM - LIDS
COMMENTS: NORMAL
COMMENTS: NORMAL

## 2018-03-21 ASSESSMENT — REFRACTION_MANIFEST
OS_SPHERE: +2.75
OS_AXIS: 050
OD_AXIS: 150
OD_SPHERE: +2.50
OS_CYLINDER: +1.00
OD_CYLINDER: +0.75

## 2018-03-21 ASSESSMENT — TONOMETRY
IOP_METHOD: TONOPEN
OD_IOP_MMHG: 14
OS_IOP_MMHG: 13

## 2018-03-21 ASSESSMENT — EXTERNAL EXAM - LEFT EYE: OS_EXAM: NORMAL

## 2018-03-21 ASSESSMENT — EXTERNAL EXAM - RIGHT EYE: OD_EXAM: NORMAL

## 2018-03-21 ASSESSMENT — CUP TO DISC RATIO
OS_RATIO: 0.3
OD_RATIO: 0.3

## 2018-03-21 ASSESSMENT — CONF VISUAL FIELD
OS_NORMAL: 1
OD_NORMAL: 1

## 2018-03-21 NOTE — PROGRESS NOTES
HPI  Hafsa Corona is a 63 year old female here for cataract evaluation. She states her left eye vision has continued to get worse. She can no longer see much at all out of the left eye.    POH: No history of eye trauma  PMH: S/p kidney surgery with one functional kidney, diabetes.    Assessment & Plan      (H25.13) Nuclear sclerotic cataract of both eyes  (primary encounter diagnosis)  Comment: Visually significant OS > OD with dense PSC OS > OD    Dilates to: 7 mm  Alpha blockers/Flomax: None  Trauma/Pseudoxfoliation: None  Fuchs dystrophy/guttae: None    Diabetes: YES, no visible retinopathy  Anticoagulation: None    Cyl: 0.64 @ 104 OD, 3.28 @ 075 OS    We discussed the risks and benefits of cataract surgery in the left eye, and informed consent was obtained.  Proceed with CE/IOL OS.    Surgical plan:  Retrobulbar/MAC  Toric vs considering toric Symfony left eye vs resident toric    (H52.203) Hyperopic astigmatism of both eyes/(H52.4) Presbyopia  Comment: Vision limited by above  Plan: Observe    -----------------------------------------------------------------------------------    Patient disposition:   Return for scheduled procedure. or sooner as needed.    Teaching statement:  Complete documentation of historical and exam elements from today's encounter can be found in the full encounter summary report (not reduplicated in this progress note). I personally obtained the chief complaint(s) and history of present illness.  I confirmed and edited as necessary the review of systems, past medical/surgical history, family history, social history, and examination findings as documented by others; and I examined the patient myself. I personally reviewed the relevant tests, images, and reports as documented above.     I formulated and edited as necessary the assessment and plan and discussed the findings and management plan with the patient and family.    Mary Jo Massey MD  Comprehensive Ophthalmology & Ocular  Pathology  Department of Ophthalmology and Visual Neurosciences  [email or web address]  Pager [phone]

## 2018-03-21 NOTE — NURSING NOTE
Chief Complaints and History of Present Illnesses   Patient presents with     Follow Up For     Age-related nuclear cataract, left (Primary Dx)     HPI    Affected eye(s):  Both   Symptoms:     Decreased vision   Floaters (Comment: LE)   No flashes   Glare   No Dryness      Frequency:  Constant       Do you have eye pain now?:  No      Comments:  Pt. stated decreased vision near more than distance along with glare LE more than RE 2-3 years.  Lab Results       Component                Value               Date                       A1C                      6.1                 08/31/2017                 A1C                      6.0                 04/11/2017                 A1C                      6.2                 09/06/2016                 A1C                      6.0                 02/04/2016                 A1C                      6.2                 06/01/2015            BS:  Jevon Valentine  3:00 PM March 21, 2018

## 2018-03-21 NOTE — MR AVS SNAPSHOT
After Visit Summary   3/21/2018    Hafsa Corona    MRN: [id number]           Patient Information     Date Of Birth          1955        Visit Information        Provider Department      3/21/2018 2:30 PM [facility]        Today's Diagnoses     Nuclear sclerotic cataract of both eyes    -  1    Hyperopic astigmatism of both eyes        Presbyopia           Follow-ups after your visit        Follow-up notes from your care team     Return for scheduled procedure.      Your next 10 appointments already scheduled     Apr 05, 2018  7:00 PM CDT   (Arrive by 6:45 PM)   MR BREAST BILATERAL W/O & W CONTRAST with [address] [facility] [place] [facility] ([facility] [place])    [address]  [facility] [address] [phone]           Take your medicines as usual, unless your doctor tells you not to. Bring a list of your current medicines to your exam (including vitamins, minerals and over-the-counter drugs).  The timing of your exam may depend on the start of your last period. If you re in menopause, you may have the exam anytime.  Please bring any previous mammograms or breast MRIs from other facilities to the MRI dept. Do not mail these items to us.   You will have IV contrast for this exam.  You do not need to do anything special to prepare.  The MRI machine uses a strong magnet. Please wear clothes without metal (snaps, zippers). A sweatsuit works well, or we may give you a hospital gown.  Please remove any body piercings and hair extensions before you arrive. You will also remove watches, jewelry, hairpins, wallets, dentures, partial dental plates and hearing aids. You may wear contact lenses, and you may be able to wear your rings. We have a safe place to keep your personal items, but it is safer to leave them at home.  **IMPORTANT** THE INSTRUCTIONS BELOW ARE ONLY FOR THOSE PATIENTS WHO HAVE BEEN PRESCRIBED SEDATION OR GENERAL ANESTHESIA  DURING THEIR MRI PROCEDURE:  IF YOUR DOCTOR PRESCRIBED ORAL SEDATION (take medicine to help you relax during your exam):   You must get the medicine from your doctor (oral medication) before you arrive. Bring the medicine to the exam. Do not take it at home. You ll be told when to take it upon arriving for your exam.   Arrive one hour early. Bring someone who can take you home after the test. Your medicine will make you sleepy. After the exam, you may not drive, take a bus or take a taxi by yourself.  IF YOUR DOCTOR PRESCRIBED IV SEDATION:   Arrive one hour early. Bring someone who can take you home after the test. Your medicine will make you sleepy. After the exam, you may not drive, take a bus or take a taxi by yourself.   No eating 6 hours before your exam. You may have clear liquids up until 4 hours before your exam. (Clear liquids include water, clear tea, black coffee and fruit juice without pulp.)  IF YOUR DOCTOR PRESCRIBED ANESTHESIA (be asleep for your exam):   Arrive 1 1/2 hours early. Bring someone who can take you home after the test. You may not drive, take a bus or take a taxi by yourself.   No eating 8 hours before your exam. You may have clear liquids up until 4 hours before your exam. (Clear liquids include water, clear tea, black coffee and fruit juice without pulp.)   You will spend four to five hours in the recovery room.  If you have any questions, please contact your Imaging Department exam site.            Apr 12, 2018 11:00 AM CDT   (Arrive by 10:45 AM)   RETURN WITH ROOM with Lola Vasquez PhD ,  2 114 CONSULT [facility] ([facility])    [address] [place] [address]  [address] [place] [address]   [phone]            Apr 19, 2018 12:00 PM CDT   (Arrive by 11:45 AM)   RETURN WITH ROOM with Lola Vasquez PhD ,  2 114 CONSULT [facility])    [address] [place] [address]  [address]  [facility] [address]   [phone]            Apr 26, 2018 11:00 AM CDT   (Arrive by 10:45 AM)   RETURN WITH ROOM with Lola Vasquez, PhD ,  2 114 CONSULT [facility] ([facility])    [address]  [address]  [facility] [address]   [phone]            May 18, 2018 11:45 AM CDT   (Arrive by 11:30 AM)   Post-Op with Mary Jo Massey MD   [facility] ([facility])    [address]  4th Floor  [facility] [address]   [phone]            May 22, 2018 11:15 AM CDT   [facility] Draw with [facility] LAB DRAW   [facility] Lab Draw ([facility])    [address]  [address]  [facility] [address]   [phone]            May 22, 2018 11:40 AM CDT   (Arrive by 11:25 AM)   CT CHEST/ABDOMEN/PELVIS W CONTRAST with UC[facility]   [facility] ([facility])    [address]  1st Floor  [facility] [address]   [phone]           Please bring any scans or X-rays taken at other hospitals, if similar tests were done. Also bring a list of your medicines, including vitamins, minerals and over-the-counter drugs. It is safest to leave personal items at home.  Be sure to tell your doctor:   If you have any allergies.   If there s any chance you are pregnant.   If you are breastfeeding.  You may have contrast for this exam. To prepare:   Do not eat or drink for 2 hours before your exam. If you need to take medicine, you may take it with small sips of water. (We may ask you to take liquid medicine as well.)   The day before your exam, drink extra fluids at least six 8-ounce glasses (unless your doctor tells you to restrict your fluids).   You will be given instructions on how to drink the contrast.  Patients over 70 or patients with diabetes or kidney problems:   If you haven t had a blood test (creatinine test) within the last  30 days, the Cardiologist/Radiologist may require you to get this test prior to your exam.  If you have diabetes:   Continue to take your metformin medication on the day of your exam  Please wear loose clothing, such as a sweat suit or jogging clothes. Avoid snaps, zippers and other metal. We may ask you to undress and put on a hospital gown.  If you have any questions, please call the Imaging Department where you will have your exam.              Who to contact     Please call your clinic at [phone] to:    Ask questions about your health    Make or cancel appointments    Discuss your medicines    Learn about your test results    Speak to your doctor            Additional Information About Your Visit        [employer] Information     [employer] gives you secure access to your electronic health record. If you see a primary care provider, you can also send messages to your care team and make appointments. If you have questions, please call your primary care clinic.  If you do not have a primary care provider, please call [phone] and they will assist you.      [employer] is an electronic gateway that provides easy, online access to your medical records. With [employer], you can request a clinic appointment, read your test results, renew a prescription or communicate with your care team.     To access your existing account, please contact your [facility] or call [phone] for assistance.        Care EveryWhere ID     This is your Care EveryWhere ID. This could be used by other organizations to access your [place] medical records  [id number]        Your Vitals Were     Last Period                   01/01/2009            Blood Pressure from Last 3 Encounters:   03/16/18 104/70   03/12/18 92/70   03/02/18 (!) 88/59    Weight from Last 3 Encounters:   03/16/18 86.1 kg (189 lb 12.8 oz)   02/26/18 83.8 kg (184 lb 12.8 oz)   12/01/17 85.7 kg (189 lb)              We Performed the Following      [facility]-Operative Worksheet          Today's Medication Changes          These changes are accurate as of 3/21/18 11:59 PM.  If you have any questions, ask your nurse or doctor.               These medicines have changed or have updated prescriptions.        Dose/Directions    metFORMIN 500 MG 24 hr tablet   Commonly known as:  GLUCOPHAGE-XR   This may have changed:    - how much to take  - when to take this  - additional instructions   Used for:  Type 2 diabetes mellitus without complication, without long-term current use of insulin (H)        Take two tabs by mouth once daily with evening meal.   Quantity:  180 tablet   Refills:  PRN                Primary Care Provider Office Phone # Fax #    [place] HERRERA ATTILA Ayon [phone] [address] [facility] [address] [address]        Equal Access to Services     RONEY ARANDA : Ward mckeono Soaxel, waaxda luqadaha, qaybta kaalmada adeegyada, sujatha betts . So [facility] [phone].    ATENCIÓN: Si habla español, tiene a martínez disposición servicios gratuitos de asistencia lingüística. [facility] [phone].    We comply with applicable federal civil rights laws and [place] laws. We do not discriminate on the basis of race, color, national origin, age, disability, sex, sexual orientation, or gender identity.            Thank you!     Thank you for choosing [facility]  for your care. Our goal is always to provide you with excellent care. Hearing back from our patients is one way we can continue to improve our services. Please take a few minutes to complete the written survey that you may receive in the mail after your visit with us. Thank you!             Your Updated Medication List - Protect others around you: Learn how to safely use, store and throw away your medicines at www.disposemymeds.org.          This list is accurate as of 3/21/18 11:59 PM.  Always use your most recent med list.                   Brand Name Dispense  Instructions for use Diagnosis    atorvastatin 80 MG tablet    LIPITOR    90 tablet    Take 1 tablet (80 mg) by mouth daily    Hyperlipidemia LDL goal <100       blood glucose lancets standard    no brand specified    100 each    Use to test blood sugar 2 times daily or as directed.    Type 2 diabetes mellitus without complication, without long-term current use of insulin (H)       blood glucose monitoring meter device kit    no brand specified    1 kit    Use to test blood sugar 2 times daily or as directed.    Type 2 diabetes mellitus without complication, without long-term current use of insulin (H)       blood glucose monitoring test strip    no brand specified    100 strip    Use to test blood sugars 2 times daily or as directed    Type 2 diabetes mellitus without complication, without long-term current use of insulin (H)       CALCIUM-MAGNESIUM-VITAMIN D PO      Take 2 tablets by mouth daily        cephALEXin 500 MG capsule    KEFLEX    21 capsule    Take 1 capsule (500 mg) by mouth 3 times daily    Infected sebaceous cyst of skin       EPINEPHrine 0.3 MG/0.3ML injection 2-pack    EPIPEN/ADRENACLICK/or ANY BX GENERIC EQUIV    1 each    Inject 0.3 mLs (0.3 mg) into the muscle once as needed for anaphylaxis    Bee allergy status       escitalopram 20 MG tablet    LEXAPRO    30 tablet    Take 1 tablet (20 mg) by mouth daily    Major depressive disorder, recurrent episode, mild (H)       fluticasone 50 MCG/ACT spray    FLONASE    48 mL    SHAKE WELL AND USE 2 SPRAYS IN EACH NOSTRIL DAILY    Chronic maxillary sinusitis       lisinopril 10 MG tablet    PRINIVIL/ZESTRIL    90 tablet    Take 1 tablet (10 mg) by mouth daily    Essential hypertension       loratadine 10 MG tablet    CLARITIN    30 tablet    Take 1 tablet (10 mg) by mouth daily    Pain in joint, multiple sites       LORazepam 1 MG tablet    ATIVAN    30 tablet    Take 1 tablet (1 mg) by mouth every 6 hours as needed (nausea/vomiting, anxiety or sleep)     "Ovarian cancer, left (H), Encounter for long-term (current) use of medications       metFORMIN 500 MG 24 hr tablet    GLUCOPHAGE-XR    180 tablet    Take two tabs by mouth once daily with evening meal.    Type 2 diabetes mellitus without complication, without long-term current use of insulin (H)       MULTIVITAMIN ADULT PO      Take 1 tablet by mouth daily        order for DME     1 Units    Equipment being ordered: blood pressure monitor    Essential hypertension       * order for DME     1 each    Compression stockings 20-30 mm Hg. To be wear when directed by Hematology team and while awake for the first two years post clot.    Long-term (current) use of anticoagulants, Acute deep vein thrombosis (DVT) of left lower extremity, unspecified vein (H)       * order for DME     1 Bottle    Plain packing strip 1/4\"    Abscess of leg       * order for DME     1 Box    Sterile zoey tipped applicators    Abscess of leg       * order for DME     3 Bottle    USE 1/4 INCH  WIDTH PLAIN NU GAUZE PACKING TO DO SELF WOUND CARE OF LEFT LOWER LEG ONCE DAILY. DIMENSIONS OF WOUND PRESENTLY ARE 2 X 2 INCH AND APPROXIMATELY 1/2 INCH DEEP.  USES 6 INCHES OF PACKING CURRENTLY FOR DRESSING CHANGE.  FAX TO [employer] [phone]    Wound of left leg       * order for DME     30 pad    STERILE ADHESIVE PAD BANDAGE AT LEAST 4X4 INCH IN SIZE NEEDED FOR DAILY WOUND CARE TO LEFT LOWER LEG. FAX TO [employer] [phone]    Leg wound, left       * order for DME     1 Units    Home blood pressure monitor    Type 2 diabetes mellitus without complication, without long-term current use of insulin (H)       PRO-BIOTIC BLEND PO      Take 1 capsule by mouth daily Enzymatic Therapy-probiotic pearls        rivaroxaban ANTICOAGULANT 20 MG Tabs tablet    XARELTO    30 tablet    Take 1 tablet (20 mg) by mouth daily (with dinner)    Long-term (current) use of anticoagulants, Acute deep vein thrombosis (DVT) of left lower extremity, unspecified " vein (H)       traZODone 50 MG tablet    DESYREL    180 tablet    TAKE 1 TO 2 TABLETS(50  MG) BY MOUTH EVERY NIGHT AS NEEDED FOR SLEEP    Insomnia, unspecified type       VITAMIN B-COMPLEX PO           vitamin D 1000 UNITS capsule      Take 2,000 Units by mouth daily        * Notice:  This list has 6 medication(s) that are the same as other medications prescribed for you. Read the directions carefully, and ask your doctor or other care provider to review them with you.

## 2018-03-22 ENCOUNTER — DOCUMENTATION ONLY (OUTPATIENT)
Dept: ONCOLOGY | Facility: CLINIC | Age: 63
End: 2018-03-22

## 2018-03-22 ENCOUNTER — OFFICE VISIT (OUTPATIENT)
Dept: PSYCHOLOGY | Facility: CLINIC | Age: 63
End: 2018-03-22
Attending: PSYCHOLOGIST
Payer: COMMERCIAL

## 2018-03-22 DIAGNOSIS — F43.20 ADJUSTMENT DISORDER, UNSPECIFIED TYPE: Primary | ICD-10-CM

## 2018-03-22 DIAGNOSIS — Z90.5 HISTORY OF NEPHRECTOMY: ICD-10-CM

## 2018-03-22 LAB
ANION GAP SERPL CALCULATED.3IONS-SCNC: 6 MMOL/L (ref 3–14)
BUN SERPL-MCNC: 30 MG/DL (ref 7–30)
CALCIUM SERPL-MCNC: 9.2 MG/DL (ref 8.5–10.1)
CHLORIDE SERPL-SCNC: 104 MMOL/L (ref 94–109)
CO2 SERPL-SCNC: 28 MMOL/L (ref 20–32)
CREAT SERPL-MCNC: 1.15 MG/DL (ref 0.52–1.04)
GFR SERPL CREATININE-BSD FRML MDRD: 48 ML/MIN/1.7M2
GLUCOSE SERPL-MCNC: 91 MG/DL (ref 70–99)
POTASSIUM SERPL-SCNC: 4.1 MMOL/L (ref 3.4–5.3)
SODIUM SERPL-SCNC: 138 MMOL/L (ref 133–144)

## 2018-03-22 PROCEDURE — 90834 PSYTX W PT 45 MINUTES: CPT | Mod: ZP | Performed by: PSYCHOLOGIST

## 2018-03-22 PROCEDURE — 36591 DRAW BLOOD OFF VENOUS DEVICE: CPT

## 2018-03-22 PROCEDURE — 25000128 H RX IP 250 OP 636: Performed by: NURSE PRACTITIONER

## 2018-03-22 PROCEDURE — 80048 BASIC METABOLIC PNL TOTAL CA: CPT | Performed by: CLINICAL NURSE SPECIALIST

## 2018-03-22 RX ORDER — HEPARIN SODIUM (PORCINE) LOCK FLUSH IV SOLN 100 UNIT/ML 100 UNIT/ML
5 SOLUTION INTRAVENOUS ONCE
Status: COMPLETED | OUTPATIENT
Start: 2018-03-22 | End: 2018-03-22

## 2018-03-22 RX ADMIN — SODIUM CHLORIDE, PRESERVATIVE FREE 5 ML: 5 INJECTION INTRAVENOUS at 13:03

## 2018-03-22 NOTE — PROGRESS NOTES
"PSYCHOLOGY PROGRESS NOTE - #3.  Seen for 50 min.  INTERV pr solving, support.  DX F43.23  IN  Stress  S \"it went well. . Kidney cancer. . Removed my kidney. . 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  Relieved.  Appreciative of [facility].  A Challenging past several weeks.  Able to see ahead, able to see positives in some neg experiences, e.g., scan assoc with ov cancer detected kidney cancer.  Plan Rt 1-2 wks.  TP  9.12.2017  "

## 2018-03-22 NOTE — MR AVS SNAPSHOT
After Visit Summary   3/22/2018    Hafsa Corona    MRN: [id number]           Patient Information     Date Of Birth          1955        Visit Information        Provider Department      3/22/2018 12:00 PM Lola Vasquez, PhD LP;  2 [address] CONSULT [facility]        Today's Diagnoses     Adjustment disorder, unspecified type    -  1       Follow-ups after your visit        Your next 10 appointments already scheduled     Mar 30, 2018  7:45 PM CDT   (Arrive by 7:30 PM)   MR BREAST BILATERAL W/O & W CONTRAST with [address] [facility] ([facility])    [address]  [facility] [address] [phone]           Take your medicines as usual, unless your doctor tells you not to. Bring a list of your current medicines to your exam (including vitamins, minerals and over-the-counter drugs).  The timing of your exam may depend on the start of your last period. If you re in menopause, you may have the exam anytime.  Please bring any previous mammograms or breast MRIs from other facilities to the MRI dept. Do not mail these items to us.   You will have IV contrast for this exam.  You do not need to do anything special to prepare.  The MRI machine uses a strong magnet. Please wear clothes without metal (snaps, zippers). A sweatsuit works well, or we may give you a hospital gown.  Please remove any body piercings and hair extensions before you arrive. You will also remove watches, jewelry, hairpins, wallets, dentures, partial dental plates and hearing aids. You may wear contact lenses, and you may be able to wear your rings. We have a safe place to keep your personal items, but it is safer to leave them at home.  **IMPORTANT** THE INSTRUCTIONS BELOW ARE ONLY FOR THOSE PATIENTS WHO HAVE BEEN PRESCRIBED SEDATION OR GENERAL ANESTHESIA DURING THEIR MRI PROCEDURE:  IF YOUR DOCTOR PRESCRIBED ORAL SEDATION (take medicine to help you relax  during your exam):   You must get the medicine from your doctor (oral medication) before you arrive. Bring the medicine to the exam. Do not take it at home. You ll be told when to take it upon arriving for your exam.   Arrive one hour early. Bring someone who can take you home after the test. Your medicine will make you sleepy. After the exam, you may not drive, take a bus or take a taxi by yourself.  IF YOUR DOCTOR PRESCRIBED IV SEDATION:   Arrive one hour early. Bring someone who can take you home after the test. Your medicine will make you sleepy. After the exam, you may not drive, take a bus or take a taxi by yourself.   No eating 6 hours before your exam. You may have clear liquids up until 4 hours before your exam. (Clear liquids include water, clear tea, black coffee and fruit juice without pulp.)  IF YOUR DOCTOR PRESCRIBED ANESTHESIA (be asleep for your exam):   Arrive 1 1/2 hours early. Bring someone who can take you home after the test. You may not drive, take a bus or take a taxi by yourself.   No eating 8 hours before your exam. You may have clear liquids up until 4 hours before your exam. (Clear liquids include water, clear tea, black coffee and fruit juice without pulp.)   You will spend four to five hours in the recovery room.  If you have any questions, please contact your Imaging Department exam site.            Apr 05, 2018 12:00 PM CDT   (Arrive by 11:45 AM)   RETURN WITH ROOM with Lola Vasquez PhD ,  2 [address] [facility]ULT [facility])    [address] [place] [address]  [address] [place] [address]   [phone]            Apr 19, 2018 12:00 PM CDT   (Arrive by 11:45 AM)   RETURN WITH ROOM with Lola Vasquez PhD ,  2 [address] CONSULT [facility])    [address] [place] [address]  [address] [place] [address]   [phone]            May 22, 2018 11:15 AM CDT   [facility] Lab Draw with  [facility] LAB DRAW   [facility] Lab Draw ([facility])    [address]  [address]  [facility] [id number] [phone]            May 22, 2018 11:40 AM CDT   (Arrive by 11:25 AM)   CT CHEST/ABDOMEN/PELVIS W CONTRAST with UCCT2   [facility] CT ([facility])    [address]  1st Floor  [facility] [id number] [phone]           Please bring any scans or X-rays taken at other hospitals, if similar tests were done. Also bring a list of your medicines, including vitamins, minerals and over-the-counter drugs. It is safest to leave personal items at home.  Be sure to tell your doctor:   If you have any allergies.   If there s any chance you are pregnant.   If you are breastfeeding.  You may have contrast for this exam. To prepare:   Do not eat or drink for 2 hours before your exam. If you need to take medicine, you may take it with small sips of water. (We may ask you to take liquid medicine as well.)   The day before your exam, drink extra fluids at least six 8-ounce glasses (unless your doctor tells you to restrict your fluids).   You will be given instructions on how to drink the contrast.  Patients over 70 or patients with diabetes or kidney problems:   If you haven t had a blood test (creatinine test) within the last 30 days, the Cardiologist/Radiologist may require you to get this test prior to your exam.  If you have diabetes:   Continue to take your metformin medication on the day of your exam  Please wear loose clothing, such as a sweat suit or jogging clothes. Avoid snaps, zippers and other metal. We may ask you to undress and put on a hospital gown.  If you have any questions, please call the Imaging Department where you will have your exam.            May 25, 2018  2:00 PM CDT   (Arrive by 1:45 PM)   Return Visit with MARLON Ogden CNP   [facility] ([facility])  "   [address]  [address]  [facility] [address]   [phone]            Sep 21, 2018 12:00 PM CDT   (Arrive by 11:45 AM)   Return Visit with MARLON Chiu   [facility] ([facility])    [address]  [address]  [facility] [address]   [phone]            Sep 21, 2018 12:30 PM CDT   (Arrive by 12:15 PM)   MA SCREENING BILATERAL W/ THAIS with UCBCMA1   [facility] ([facility])    [address]  2nd Floor  [facility] [address]   [phone]           Three-dimensional (3D) mammograms are available at [facility] locations in [facility], [place], [place], [facility], [facility], and [place]. [facility] locations include [place] and [facility] in [place]. Benefits of 3D mammograms include: - Improved rate of cancer detection - Decreases your chance of having to go back for more tests, which means fewer: - \"False-positive\" results (This means that there is an abnormal area but it isn't cancer.) - Invasive testing procedures, such as a biopsy or surgery - Can provide clearer images of the breast if you have dense breast tissue. 3D mammography is an optional exam that anyone can have with a 2D mammogram. It doesn't replace or take the place of a 2D mammogram. 2D mammograms remain an effective screening test for all women.  Not all insurance companies cover the cost of a 3D mammogram. Check with your insurance.              Who to contact     If you have questions or need follow up information about today's clinic visit or your schedule please contact [facility] directly at [phone].  Normal or non-critical lab and imaging results will be communicated to you by MyChart, letter or phone within 4 business days after the clinic has received the results. If you do not hear from us within 7 days, please contact the clinic " through [employer] or phone. If you have a critical or abnormal lab result, we will notify you by phone as soon as possible.  Submit refill requests through [employer] or call your pharmacy and they will forward the refill request to us. Please allow 3 business days for your refill to be completed.          Additional Information About Your Visit        [employer] Information     [employer] gives you secure access to your electronic health record. If you see a primary care provider, you can also send messages to your care team and make appointments. If you have questions, please call your primary care clinic.  If you do not have a primary care provider, please call [phone] and they will assist you.        Care EveryWhere ID     This is your Care EveryWhere ID. This could be used by other organizations to access your [place] medical records  [id number]        Your Vitals Were     Last Period                   01/01/2009            Blood Pressure from Last 3 Encounters:   03/16/18 104/70   03/12/18 92/70   03/02/18 (!) 88/59    Weight from Last 3 Encounters:   03/16/18 86.1 kg (189 lb 12.8 oz)   02/26/18 83.8 kg (184 lb 12.8 oz)   12/01/17 85.7 kg (189 lb)              Today, you had the following     No orders found for display         Today's Medication Changes          These changes are accurate as of 3/22/18 11:59 PM.  If you have any questions, ask your nurse or doctor.               These medicines have changed or have updated prescriptions.        Dose/Directions    metFORMIN 500 MG 24 hr tablet   Commonly known as:  GLUCOPHAGE-XR   This may have changed:    - how much to take  - when to take this  - additional instructions   Used for:  Type 2 diabetes mellitus without complication, without long-term current use of insulin (H)        Take two tabs by mouth once daily with evening meal.   Quantity:  180 tablet   Refills:  PRN                Primary Care Provider Office Phone # Fax #    Morelia Ayon NP  [phone]       [address] [place]  [facility] [address]        Equal Access to Services     RONEY ARANDA : Hadii aad ku hadnivia Leach, wagabrielleda lusae, shanata kaxochiltda néstor, sujatha friedman. So [facility] [phone].    ATENCIÓN: Si habla español, tiene a martínez disposición servicios gratuitos de asistencia lingüística. Llame al [phone].    We comply with applicable federal civil rights laws and [place] laws. We do not discriminate on the basis of race, color, national origin, age, disability, sex, sexual orientation, or gender identity.            Thank you!     Thank you for choosing [facility]  for your care. Our goal is always to provide you with excellent care. Hearing back from our patients is one way we can continue to improve our services. Please take a few minutes to complete the written survey that you may receive in the mail after your visit with us. Thank you!             Your Updated Medication List - Protect others around you: Learn how to safely use, store and throw away your medicines at www.disposemymeds.org.          This list is accurate as of 3/22/18 11:59 PM.  Always use your most recent med list.                   Brand Name Dispense Instructions for use Diagnosis    atorvastatin 80 MG tablet    LIPITOR    90 tablet    Take 1 tablet (80 mg) by mouth daily    Hyperlipidemia LDL goal <100       blood glucose lancets standard    no brand specified    100 each    Use to test blood sugar 2 times daily or as directed.    Type 2 diabetes mellitus without complication, without long-term current use of insulin (H)       blood glucose monitoring meter device kit    no brand specified    1 kit    Use to test blood sugar 2 times daily or as directed.    Type 2 diabetes mellitus without complication, without long-term current use of insulin (H)       blood glucose monitoring test strip    no brand specified    100 strip    Use to test blood  sugars 2 times daily or as directed    Type 2 diabetes mellitus without complication, without long-term current use of insulin (H)       CALCIUM-MAGNESIUM-VITAMIN D PO      Take 2 tablets by mouth daily        cephALEXin 500 MG capsule    KEFLEX    21 capsule    Take 1 capsule (500 mg) by mouth 3 times daily    Infected sebaceous cyst of skin       EPINEPHrine 0.3 MG/0.3ML injection 2-pack    EPIPEN/ADRENACLICK/or ANY BX GENERIC EQUIV    1 each    Inject 0.3 mLs (0.3 mg) into the muscle once as needed for anaphylaxis    Bee allergy status       escitalopram 20 MG tablet    LEXAPRO    30 tablet    Take 1 tablet (20 mg) by mouth daily    Major depressive disorder, recurrent episode, mild (H)       fluticasone 50 MCG/ACT spray    FLONASE    48 mL    SHAKE WELL AND USE 2 SPRAYS IN EACH NOSTRIL DAILY    Chronic maxillary sinusitis       lisinopril 10 MG tablet    PRINIVIL/ZESTRIL    90 tablet    Take 1 tablet (10 mg) by mouth daily    Essential hypertension       loratadine 10 MG tablet    CLARITIN    30 tablet    Take 1 tablet (10 mg) by mouth daily    Pain in joint, multiple sites       LORazepam 1 MG tablet    ATIVAN    30 tablet    Take 1 tablet (1 mg) by mouth every 6 hours as needed (nausea/vomiting, anxiety or sleep)    Ovarian cancer, left (H), Encounter for long-term (current) use of medications       metFORMIN 500 MG 24 hr tablet    GLUCOPHAGE-XR    180 tablet    Take two tabs by mouth once daily with evening meal.    Type 2 diabetes mellitus without complication, without long-term current use of insulin (H)       MULTIVITAMIN ADULT PO      Take 1 tablet by mouth daily        order for DME     1 Units    Equipment being ordered: blood pressure monitor    Essential hypertension       * order for DME     1 each    Compression stockings 20-30 mm Hg. To be wear when directed by Hematology team and while awake for the first two years post clot.    Long-term (current) use of anticoagulants, Acute deep vein thrombosis  "(DVT) of left lower extremity, unspecified vein (H)       * order for DME     1 Bottle    Plain packing strip 1/4\"    Abscess of leg       * order for DME     1 Box    Sterile zoey tipped applicators    Abscess of leg       * order for DME     3 Bottle    USE 1/4 INCH  WIDTH PLAIN NU GAUZE PACKING TO DO SELF WOUND CARE OF LEFT LOWER LEG ONCE DAILY. DIMENSIONS OF WOUND PRESENTLY ARE 2 X 2 INCH AND APPROXIMATELY 1/2 INCH DEEP.  USES 6 INCHES OF PACKING CURRENTLY FOR DRESSING CHANGE.  FAX TO [employer] [phone]    Wound of left leg       * order for DME     30 pad    STERILE ADHESIVE PAD BANDAGE AT LEAST 4X4 INCH IN SIZE NEEDED FOR DAILY WOUND CARE TO LEFT LOWER LEG. [phone]    Leg wound, left       * order for DME     1 Units    Home blood pressure monitor    Type 2 diabetes mellitus without complication, without long-term current use of insulin (H)       PRO-BIOTIC BLEND PO      Take 1 capsule by mouth daily Enzymatic Therapy-probiotic pearls        rivaroxaban ANTICOAGULANT 20 MG Tabs tablet    XARELTO    30 tablet    Take 1 tablet (20 mg) by mouth daily (with dinner)    Long-term (current) use of anticoagulants, Acute deep vein thrombosis (DVT) of left lower extremity, unspecified vein (H)       traZODone 50 MG tablet    DESYREL    180 tablet    TAKE 1 TO 2 TABLETS(50  MG) BY MOUTH EVERY NIGHT AS NEEDED FOR SLEEP    Insomnia, unspecified type       VITAMIN B-COMPLEX PO           vitamin D 1000 UNITS capsule      Take 2,000 Units by mouth daily        * Notice:  This list has 6 medication(s) that are the same as other medications prescribed for you. Read the directions carefully, and ask your doctor or other care provider to review them with you.      "

## 2018-03-22 NOTE — NURSING NOTE
Chief Complaint   Patient presents with     Port Draw     labs drawn via port by RN     LMP 01/01/2009    Port accessed by RN. Labs collected and sent. Line flushed with NS & Heparin. Pt tolerated well.    Brenna Silva RN

## 2018-03-23 ENCOUNTER — TELEPHONE (OUTPATIENT)
Dept: OPHTHALMOLOGY | Facility: CLINIC | Age: 63
End: 2018-03-23

## 2018-04-03 ENCOUNTER — TELEPHONE (OUTPATIENT)
Dept: OPHTHALMOLOGY | Facility: CLINIC | Age: 63
End: 2018-04-03

## 2018-04-05 ENCOUNTER — RADIANT APPOINTMENT (OUTPATIENT)
Dept: MRI IMAGING | Facility: CLINIC | Age: 63
End: 2018-04-05
Attending: CLINICAL NURSE SPECIALIST
Payer: COMMERCIAL

## 2018-04-05 ENCOUNTER — TELEPHONE (OUTPATIENT)
Dept: OPHTHALMOLOGY | Facility: CLINIC | Age: 63
End: 2018-04-05

## 2018-04-05 ENCOUNTER — OFFICE VISIT (OUTPATIENT)
Dept: PSYCHOLOGY | Facility: CLINIC | Age: 63
End: 2018-04-05
Attending: PSYCHOLOGIST
Payer: COMMERCIAL

## 2018-04-05 DIAGNOSIS — F43.20 ADJUSTMENT DISORDER, UNSPECIFIED TYPE: Primary | ICD-10-CM

## 2018-04-05 DIAGNOSIS — Z12.39 ENCOUNTER FOR BREAST CANCER SCREENING OTHER THAN MAMMOGRAM: ICD-10-CM

## 2018-04-05 DIAGNOSIS — Z91.89 AT HIGH RISK FOR BREAST CANCER: ICD-10-CM

## 2018-04-05 DIAGNOSIS — R92.30 DENSE BREAST TISSUE ON MAMMOGRAM: ICD-10-CM

## 2018-04-05 DIAGNOSIS — Z80.3 FAMILY HISTORY OF MALIGNANT NEOPLASM OF BREAST: ICD-10-CM

## 2018-04-05 PROCEDURE — 40000114 ZZH STATISTIC NO CHARGE CLINIC VISIT

## 2018-04-05 PROCEDURE — 90834 PSYTX W PT 45 MINUTES: CPT | Mod: ZP | Performed by: PSYCHOLOGIST

## 2018-04-05 RX ORDER — GADOBUTROL 604.72 MG/ML
7.5 INJECTION INTRAVENOUS ONCE
Status: COMPLETED | OUTPATIENT
Start: 2018-04-05 | End: 2018-04-05

## 2018-04-05 RX ADMIN — GADOBUTROL 7.5 ML: 604.72 INJECTION INTRAVENOUS at 18:48

## 2018-04-05 NOTE — PROGRESS NOTES
"PSYCHOLOGY PROGRESS NOTE - #4 .  Seen for 50 min.  INDIV TH.  Interv: pr solving, support.  DX F43.23  IN Mood variability  S  \"going to have an MRI. . Fire inspection occurred. .  Like it (living space is now clean and organized). . At work. . So many good things. . Won't have that happen again, speak out (past employer=negative experience).  O  V enthusiastic.  See many opportunities present and in near future. Hopeful regarding the future.  A Discussed current positive experiences + planned project for Fall (presentation: cancer and depression).  Increasing work hrs to about 20/wk, work setting supportive and flexivle.  Pleased with expansion of job.  Has the opportunity to possibly do a workshop this Fall - v enthusiastic.  Positives > negatives.  Discussed cancer as a chronic disease.  GOAL  Invest in favorable experiences (provides balance to difficult prior several months).  PLAN 2-3 wks   9-    "

## 2018-04-05 NOTE — MR AVS SNAPSHOT
After Visit Summary   4/5/2018    Hafsa Corona    MRN: [id number]           Patient Information     Date Of Birth          1955        Visit Information        Provider Department      4/5/2018 12:00 PM Lola Vasquez, PhD LP;  2 [address] CON[facility]ULT [facility]        Today's Diagnoses     Adjustment disorder, unspecified type    -  1       Follow-ups after your visit        Your next 10 appointments already scheduled     Apr 05, 2018  7:00 PM CDT   (Arrive by 6:45 PM)   MR BREAST BILATERAL W/O & W CONTRAST with [address] [facility] ([facility])    [address]  [facility] [address] [phone]           Take your medicines as usual, unless your doctor tells you not to. Bring a list of your current medicines to your exam (including vitamins, minerals and over-the-counter drugs).  The timing of your exam may depend on the start of your last period. If you re in menopause, you may have the exam anytime.  Please bring any previous mammograms or breast MRIs from other facilities to the MRI dept. Do not mail these items to us.   You will have IV contrast for this exam.  You do not need to do anything special to prepare.  The MRI machine uses a strong magnet. Please wear clothes without metal (snaps, zippers). A sweatsuit works well, or we may give you a hospital gown.  Please remove any body piercings and hair extensions before you arrive. You will also remove watches, jewelry, hairpins, wallets, dentures, partial dental plates and hearing aids. You may wear contact lenses, and you may be able to wear your rings. We have a safe place to keep your personal items, but it is safer to leave them at home.  **IMPORTANT** THE INSTRUCTIONS BELOW ARE ONLY FOR THOSE PATIENTS WHO HAVE BEEN PRESCRIBED SEDATION OR GENERAL ANESTHESIA DURING THEIR MRI PROCEDURE:  IF YOUR DOCTOR PRESCRIBED ORAL SEDATION (take medicine to help you relax  during your exam):   You must get the medicine from your doctor (oral medication) before you arrive. Bring the medicine to the exam. Do not take it at home. You ll be told when to take it upon arriving for your exam.   Arrive one hour early. Bring someone who can take you home after the test. Your medicine will make you sleepy. After the exam, you may not drive, take a bus or take a taxi by yourself.  IF YOUR DOCTOR PRESCRIBED IV SEDATION:   Arrive one hour early. Bring someone who can take you home after the test. Your medicine will make you sleepy. After the exam, you may not drive, take a bus or take a taxi by yourself.   No eating 6 hours before your exam. You may have clear liquids up until 4 hours before your exam. (Clear liquids include water, clear tea, black coffee and fruit juice without pulp.)  IF YOUR DOCTOR PRESCRIBED ANESTHESIA (be asleep for your exam):   Arrive 1 1/2 hours early. Bring someone who can take you home after the test. You may not drive, take a bus or take a taxi by yourself.   No eating 8 hours before your exam. You may have clear liquids up until 4 hours before your exam. (Clear liquids include water, clear tea, black coffee and fruit juice without pulp.)   You will spend four to five hours in the recovery room.  If you have any questions, please contact your Imaging Department exam site.            Apr 12, 2018 11:00 AM CDT   (Arrive by 10:45 AM)   RETURN WITH ROOM with Lola Vasquez PhD ,  2 [address] [facility] [facility])    [address] [place] [address]  [address] [place] [address]   [phone]            Apr 19, 2018 12:00 PM CDT   (Arrive by 11:45 AM)   RETURN WITH ROOM with Lola Vasquez PhD ,  2 [address] [facility])    [address] [place] [address]  [address] [place] [address]   [phone]            Apr 26, 2018 11:00 AM CDT   (Arrive by 10:45 AM)    RETURN WITH ROOM with Lola Vasquez, PhD LP,  2 114 CONSULT RM   [facility] ([facility])    [address]  [address]  [facility] [address] [phone]            May 22, 2018 11:15 AM CDT   [facility] Lab Draw with [facility] LAB DRAW   [facility])    [address]  [address]  [facility] [id number] [phone]            May 22, 2018 11:40 AM CDT   (Arrive by 11:25 AM)   CT CHEST/ABDOMEN/PELVIS W CONTRAST with UCCT2   [facility] [place] [facility] ([facility])    [address]  1st Floor  [facility] [id number] [phone]           Please bring any scans or X-rays taken at other hospitals, if similar tests were done. Also bring a list of your medicines, including vitamins, minerals and over-the-counter drugs. It is safest to leave personal items at home.  Be sure to tell your doctor:   If you have any allergies.   If there s any chance you are pregnant.   If you are breastfeeding.  You may have contrast for this exam. To prepare:   Do not eat or drink for 2 hours before your exam. If you need to take medicine, you may take it with small sips of water. (We may ask you to take liquid medicine as well.)   The day before your exam, drink extra fluids at least six 8-ounce glasses (unless your doctor tells you to restrict your fluids).   You will be given instructions on how to drink the contrast.  Patients over 70 or patients with diabetes or kidney problems:   If you haven t had a blood test (creatinine test) within the last 30 days, the Cardiologist/Radiologist may require you to get this test prior to your exam.  If you have diabetes:   Continue to take your metformin medication on the day of your exam  Please wear loose clothing, such as a sweat suit or jogging clothes. Avoid snaps, zippers and other metal. We may ask you to undress and put on a hospital  dixon.  If you have any questions, please call the Imaging Department where you will have your exam.            May 25, 2018  2:00 PM CDT   (Arrive by 1:45 PM)   Return Visit with MARLON Ogden CNP   [facility] ([facility])    [address]  [address]  [facility] [address] [phone]              Who to contact     If you have questions or need follow up information about today's clinic visit or your schedule please contact [facility] directly at [phone].  Normal or non-critical lab and imaging results will be communicated to you by "[employer]"hart, letter or phone within 4 business days after the clinic has received the results. If you do not hear from us within 7 days, please contact the clinic through "[employer]"hart or phone. If you have a critical or abnormal lab result, we will notify you by phone as soon as possible.  Submit refill requests through [employer] or call your pharmacy and they will forward the refill request to us. Please allow 3 business days for your refill to be completed.          Additional Information About Your Visit        MyChart Information     [employer] gives you secure access to your electronic health record. If you see a primary care provider, you can also send messages to your care team and make appointments. If you have questions, please call your primary care clinic.  If you do not have a primary care provider, please call [phone] and they will assist you.        Care EveryWhere ID     This is your Care EveryWhere ID. This could be used by other organizations to access your [place] medical records  [id number]        Your Vitals Were     Last Period                   01/01/2009            Blood Pressure from Last 3 Encounters:   03/16/18 104/70   03/12/18 92/70   03/02/18 (!) 88/59    Weight from Last 3 Encounters:   03/16/18 86.1 kg (189 lb 12.8 oz)   02/26/18 83.8 kg (184 lb 12.8 oz)   12/01/17 85.7  kg (189 lb)              Today, you had the following     No orders found for display         Today's Medication Changes          These changes are accurate as of 4/5/18  1:54 PM.  If you have any questions, ask your nurse or doctor.               These medicines have changed or have updated prescriptions.        Dose/Directions    metFORMIN 500 MG 24 hr tablet   Commonly known as:  GLUCOPHAGE-XR   This may have changed:    - how much to take  - when to take this  - additional instructions   Used for:  Type 2 diabetes mellitus without complication, without long-term current use of insulin (H)        Take two tabs by mouth once daily with evening meal.   Quantity:  180 tablet   Refills:  PRN                Primary Care Provider Office Phone # Fax #    [place] HERRERA ATTILA Ayon [phone] [address] [facility] [address]        Equal Access to Services     RONEY ARANDA : Ward tellez Soaxel, waaxda luqadaha, qaybta kaalmada erikayafrancesca, sujatha betts . So [facility] [phone].    ATENCIÓN: Si habla español, tiene a martínez disposición servicios gratuitos de asistencia lingüística. Llame al [phone].    We comply with applicable federal civil rights laws and [place] laws. We do not discriminate on the basis of race, color, national origin, age, disability, sex, sexual orientation, or gender identity.            Thank you!     Thank you for choosing [facility]  for your care. Our goal is always to provide you with excellent care. Hearing back from our patients is one way we can continue to improve our services. Please take a few minutes to complete the written survey that you may receive in the mail after your visit with us. Thank you!             Your Updated Medication List - Protect others around you: Learn how to safely use, store and throw away your medicines at www.disposemymeds.org.          This list is accurate as of 4/5/18  1:54 PM.   Always use your most recent med list.                   Brand Name Dispense Instructions for use Diagnosis    atorvastatin 80 MG tablet    LIPITOR    90 tablet    Take 1 tablet (80 mg) by mouth daily    Hyperlipidemia LDL goal <100       blood glucose lancets standard    no brand specified    100 each    Use to test blood sugar 2 times daily or as directed.    Type 2 diabetes mellitus without complication, without long-term current use of insulin (H)       blood glucose monitoring meter device kit    no brand specified    1 kit    Use to test blood sugar 2 times daily or as directed.    Type 2 diabetes mellitus without complication, without long-term current use of insulin (H)       blood glucose monitoring test strip    no brand specified    100 strip    Use to test blood sugars 2 times daily or as directed    Type 2 diabetes mellitus without complication, without long-term current use of insulin (H)       CALCIUM-MAGNESIUM-VITAMIN D PO      Take 2 tablets by mouth daily        cephALEXin 500 MG capsule    KEFLEX    21 capsule    Take 1 capsule (500 mg) by mouth 3 times daily    Infected sebaceous cyst of skin       EPINEPHrine 0.3 MG/0.3ML injection 2-pack    EPIPEN/ADRENACLICK/or ANY BX GENERIC EQUIV    1 each    Inject 0.3 mLs (0.3 mg) into the muscle once as needed for anaphylaxis    Bee allergy status       escitalopram 20 MG tablet    LEXAPRO    30 tablet    Take 1 tablet (20 mg) by mouth daily    Major depressive disorder, recurrent episode, mild (H)       fluticasone 50 MCG/ACT spray    FLONASE    48 mL    SHAKE WELL AND USE 2 SPRAYS IN EACH NOSTRIL DAILY    Chronic maxillary sinusitis       lisinopril 10 MG tablet    PRINIVIL/ZESTRIL    90 tablet    Take 1 tablet (10 mg) by mouth daily    Essential hypertension       loratadine 10 MG tablet    CLARITIN    30 tablet    Take 1 tablet (10 mg) by mouth daily    Pain in joint, multiple sites       LORazepam 1 MG tablet    ATIVAN    30 tablet    Take 1 tablet (1  "mg) by mouth every 6 hours as needed (nausea/vomiting, anxiety or sleep)    Ovarian cancer, left (H), Encounter for long-term (current) use of medications       metFORMIN 500 MG 24 hr tablet    GLUCOPHAGE-XR    180 tablet    Take two tabs by mouth once daily with evening meal.    Type 2 diabetes mellitus without complication, without long-term current use of insulin (H)       MULTIVITAMIN ADULT PO      Take 1 tablet by mouth daily        order for DME     1 Units    Equipment being ordered: blood pressure monitor    Essential hypertension       * order for DME     1 each    Compression stockings 20-30 mm Hg. To be wear when directed by Hematology team and while awake for the first two years post clot.    Long-term (current) use of anticoagulants, Acute deep vein thrombosis (DVT) of left lower extremity, unspecified vein (H)       * order for DME     1 Bottle    Plain packing strip 1/4\"    Abscess of leg       * order for DME     1 Box    Sterile zoey tipped applicators    Abscess of leg       * order for DME     3 Bottle    USE 1/4 INCH  WIDTH PLAIN NU GAUZE PACKING TO DO SELF WOUND CARE OF LEFT LOWER LEG ONCE DAILY. DIMENSIONS OF WOUND PRESENTLY ARE 2 X 2 INCH AND APPROXIMATELY 1/2 INCH DEEP.  USES 6 INCHES OF PACKING CURRENTLY FOR DRESSING CHANGE.  FAX TO [employer] [phone]    Wound of left leg       * order for DME     30 pad    STERILE ADHESIVE PAD BANDAGE AT LEAST 4X4 INCH IN SIZE NEEDED FOR DAILY WOUND CARE TO LEFT LOWER LEG. FAX TO [facility] [facility] [phone]    Leg wound, left       * order for DME     1 Units    Home blood pressure monitor    Type 2 diabetes mellitus without complication, without long-term current use of insulin (H)       PRO-BIOTIC BLEND PO      Take 1 capsule by mouth daily Enzymatic Therapy-probiotic pearls        rivaroxaban ANTICOAGULANT 20 MG Tabs tablet    XARELTO    30 tablet    Take 1 tablet (20 mg) by mouth daily (with dinner)    Long-term (current) use of " anticoagulants, Acute deep vein thrombosis (DVT) of left lower extremity, unspecified vein (H)       traZODone 50 MG tablet    DESYREL    180 tablet    TAKE 1 TO 2 TABLETS(50  MG) BY MOUTH EVERY NIGHT AS NEEDED FOR SLEEP    Insomnia, unspecified type       VITAMIN B-COMPLEX PO           vitamin D 1000 UNITS capsule      Take 2,000 Units by mouth daily        * Notice:  This list has 6 medication(s) that are the same as other medications prescribed for you. Read the directions carefully, and ask your doctor or other care provider to review them with you.

## 2018-04-06 NOTE — DISCHARGE INSTRUCTIONS
MRI Contrast Discharge Instructions    The IV contrast you received today will pass out of your body in your  urine. This will happen in the next 24 hours. You will not feel this process.  Your urine will not change color.    Drink at least 4 extra glasses of water or juice today (unless your doctor  has restricted your fluids). This reduces the stress on your kidneys.  You may take your regular medicines.    If you are on dialysis: It is best to have dialysis today.    If you have a reaction: Most reactions happen right away. If you have  any new symptoms after leaving the hospital (such as hives or swelling),  call your hospital at the correct number below. Or call your family doctor.  If you have breathing distress or wheezing, call 911.    Special instructions: ***    I have read and understand the above information.    Signature:______________________________________ Date:___________    Staff:__________________________________________ Date:___________     Time:__________    [facility] Departments:    ___[place]: [phone]  ___[facility]: [phone]  ___[place]: [phone] ___[facility]: [phone]  ___[facility]: [phone]  ___[facility]: [phone]  ___[phone]  ___[facility]: [phone]  ___[phone]

## 2018-04-12 ENCOUNTER — OFFICE VISIT (OUTPATIENT)
Dept: PSYCHOLOGY | Facility: CLINIC | Age: 63
End: 2018-04-12
Attending: PSYCHOLOGIST
Payer: COMMERCIAL

## 2018-04-12 DIAGNOSIS — F43.20 ADJUSTMENT DISORDER, UNSPECIFIED TYPE: Primary | ICD-10-CM

## 2018-04-12 PROCEDURE — 40000114 ZZH STATISTIC NO CHARGE CLINIC VISIT

## 2018-04-12 PROCEDURE — 90834 PSYTX W PT 45 MINUTES: CPT | Mod: ZP | Performed by: PSYCHOLOGIST

## 2018-04-12 NOTE — PROGRESS NOTES
"PSYCHOLOGY PROGRESS NOTE #5  .  Seen for 50 min.  INDIV th Seen for 50 min  INDIV TH - pr solving, support.  DX  WA  STRESS  S \"really good. . . 15 hrs (working) . . The insurance (difficult issues). . .really like my job, much different job now. . the people are really kind. . Need time, know I need time\"  O Animated.  Optimistic.  In process of gradual return to work.  A Smooth transition in returning to work.  GOAL.  Adapt to new job, 20hr/wk in May.  Plan  Rt 1 wk.  TP  9.12.2017  "

## 2018-04-12 NOTE — MR AVS SNAPSHOT
After Visit Summary   4/12/2018    Hafsa Corona    MRN: [id number]           Patient Information     Date Of Birth          1955        Visit Information        Provider Department      4/12/2018 11:00 AM Lola Vasquez PhD LP;  2 118 CONSULT Formerly [facility]        Today's Diagnoses     Adjustment disorder, unspecified type    -  1       Follow-ups after your visit        Your next 10 appointments already scheduled     Apr 19, 2018 12:00 PM CDT   (Arrive by 11:45 AM)   RETURN WITH ROOM with Lola Vasquez PhD LP,  2 118 CONSULT [facility] ([facility] [place])    [address] [place] [address]  [address]  [facility] [id number] [phone]            Apr 26, 2018 11:00 AM CDT   (Arrive by 10:45 AM)   RETURN WITH ROOM with Lola Vasquez PhD LP,  2 118 CONSULT [facility] ([facility] [place])    [address] [place] [address]  [address]  [facility] [id number] [phone]            May 18, 2018   Procedure with Mary Jo Massey MD   [facility] ([facility] [place])    [address] [place] [address]  5th Floor  [facility] [id number] [phone]           Located in the Clinics and Surgery Center at [address] [place] [address].   parking is very convenient and highly recommended.  is a $6 flat rate fee.  Both  and self parkers should enter the main arrival plaza from [facility]; parking attendants will direct you based on your parking preference.            May 18, 2018 11:30 AM CDT   (Arrive by 11:15 AM)   Post-Op with Mary Jo Massey MD   [facility] ([facility] [place])    [address] [place] [address]  4th Floor  [facility] [id number] [phone]            May 22, 2018 11:15 AM CDT   [facility] Draw with  [facility] DRAW   [facility] ([facility] [place])    [address] [place] [address]  [facility]  [address]  [facility] [address]   [phone]            May 25, 2018  2:00 PM CDT   (Arrive by 1:45 PM)   Return Visit with MARLON Ogden CNP   [facility] ([facility])    [address] [facility]  [address]  [facility] [address]   [phone]            Jun 13, 2018 10:30 AM CDT   Post-Op with Mary Jo Massey MD   [facility] ([facility])    [address]  [address] [address]  [facility] [address]   [phone]            Sep 21, 2018 12:00 PM CDT   (Arrive by 11:45 AM)   Return Visit with MARLON Chiu   [facility] ([facility])    [address] [place] [address]  [address]  [facility] [address]   [phone]              Who to contact     If you have questions or need follow up information about today's clinic visit or your schedule please contact [facility] directly at [phone].  Normal or non-critical lab and imaging results will be communicated to you by MyChart, letter or phone within 4 business days after the clinic has received the results. If you do not hear from us within 7 days, please contact the clinic through [facility] or phone. If you have a critical or abnormal lab result, we will notify you by phone as soon as possible.  Submit refill requests through [facility] or call your pharmacy and they will forward the refill request to us. Please allow 3 business days for your refill to be completed.          Additional Information About Your Visit        [facility] Information     [facility] gives you secure access to your electronic health record. If you see a primary care provider, you can also send messages to your care team and make appointments. If you have questions, please call your primary care clinic.  If you do not have a primary care provider, please call [phone] and they will assist you.        Care  EveryWhere ID     This is your Care EveryWhere ID. This could be used by other organizations to access your [facility] medical records  [id number]        Your Vitals Were     Last Period                   01/01/2009            Blood Pressure from Last 3 Encounters:   03/16/18 104/70   03/12/18 92/70   03/02/18 (!) 88/59    Weight from Last 3 Encounters:   03/16/18 86.1 kg (189 lb 12.8 oz)   02/26/18 83.8 kg (184 lb 12.8 oz)   12/01/17 85.7 kg (189 lb)              Today, you had the following     No orders found for display         Today's Medication Changes          These changes are accurate as of 4/12/18  3:16 PM.  If you have any questions, ask your nurse or doctor.               These medicines have changed or have updated prescriptions.        Dose/Directions    metFORMIN 500 MG 24 hr tablet   Commonly known as:  GLUCOPHAGE-XR   This may have changed:    - how much to take  - when to take this  - additional instructions   Used for:  Type 2 diabetes mellitus without complication, without long-term current use of insulin (H)        Take two tabs by mouth once daily with evening meal.   Quantity:  180 tablet   Refills:  PRN                Primary Care Provider Office Phone # Fax #    Morelia Ayon [phone] [address] [facility] [address]        Equal Access to Services     RONEY ARANDA AH: Hadii serena joyner hadasho Soaxel, waaxda luqadaha, qaybta kaalmada adeegyada, sujatha betts . So [facility] [phone].    ATENCIÓN: Si habla español, tiene a martínez disposición servicios gratuitos de asistencia lingüística. Llame al [phone].    We comply with applicable federal civil rights laws and [place] laws. We do not discriminate on the basis of race, color, national origin, age, disability, sex, sexual orientation, or gender identity.            Thank you!     Thank you for choosing [facility]  for your care. Our goal is always to provide you  with excellent care. Hearing back from our patients is one way we can continue to improve our services. Please take a few minutes to complete the written survey that you may receive in the mail after your visit with us. Thank you!             Your Updated Medication List - Protect others around you: Learn how to safely use, store and throw away your medicines at www.disposemymeds.org.          This list is accurate as of 4/12/18  3:16 PM.  Always use your most recent med list.                   Brand Name Dispense Instructions for use Diagnosis    atorvastatin 80 MG tablet    LIPITOR    90 tablet    Take 1 tablet (80 mg) by mouth daily    Hyperlipidemia LDL goal <100       blood glucose lancets standard    no brand specified    100 each    Use to test blood sugar 2 times daily or as directed.    Type 2 diabetes mellitus without complication, without long-term current use of insulin (H)       blood glucose monitoring meter device kit    no brand specified    1 kit    Use to test blood sugar 2 times daily or as directed.    Type 2 diabetes mellitus without complication, without long-term current use of insulin (H)       blood glucose monitoring test strip    no brand specified    100 strip    Use to test blood sugars 2 times daily or as directed    Type 2 diabetes mellitus without complication, without long-term current use of insulin (H)       CALCIUM-MAGNESIUM-VITAMIN D PO      Take 2 tablets by mouth daily        cephALEXin 500 MG capsule    KEFLEX    21 capsule    Take 1 capsule (500 mg) by mouth 3 times daily    Infected sebaceous cyst of skin       EPINEPHrine 0.3 MG/0.3ML injection 2-pack    EPIPEN/ADRENACLICK/or ANY BX GENERIC EQUIV    1 each    Inject 0.3 mLs (0.3 mg) into the muscle once as needed for anaphylaxis    Bee allergy status       escitalopram 20 MG tablet    LEXAPRO    30 tablet    Take 1 tablet (20 mg) by mouth daily    Major depressive disorder, recurrent episode, mild (H)       fluticasone 50  "MCG/ACT spray    FLONASE    48 mL    SHAKE WELL AND USE 2 SPRAYS IN EACH NOSTRIL DAILY    Chronic maxillary sinusitis       lisinopril 10 MG tablet    PRINIVIL/ZESTRIL    90 tablet    Take 1 tablet (10 mg) by mouth daily    Essential hypertension       loratadine 10 MG tablet    CLARITIN    30 tablet    Take 1 tablet (10 mg) by mouth daily    Pain in joint, multiple sites       LORazepam 1 MG tablet    ATIVAN    30 tablet    Take 1 tablet (1 mg) by mouth every 6 hours as needed (nausea/vomiting, anxiety or sleep)    Ovarian cancer, left (H), Encounter for long-term (current) use of medications       metFORMIN 500 MG 24 hr tablet    GLUCOPHAGE-XR    180 tablet    Take two tabs by mouth once daily with evening meal.    Type 2 diabetes mellitus without complication, without long-term current use of insulin (H)       MULTIVITAMIN ADULT PO      Take 1 tablet by mouth daily        order for DME     1 Units    Equipment being ordered: blood pressure monitor    Essential hypertension       * order for DME     1 each    Compression stockings 20-30 mm Hg. To be wear when directed by Hematology team and while awake for the first two years post clot.    Long-term (current) use of anticoagulants, Acute deep vein thrombosis (DVT) of left lower extremity, unspecified vein (H)       * order for DME     1 Bottle    Plain packing strip 1/4\"    Abscess of leg       * order for DME     1 Box    Sterile zoey tipped applicators    Abscess of leg       * order for DME     3 Bottle    USE 1/4 INCH  WIDTH PLAIN NU GAUZE PACKING TO DO SELF WOUND CARE OF LEFT LOWER LEG ONCE DAILY. DIMENSIONS OF WOUND PRESENTLY ARE 2 X 2 INCH AND APPROXIMATELY 1/2 INCH DEEP.  USES 6 INCHES OF PACKING CURRENTLY FOR DRESSING CHANGE.  FAX TO [employer] [phone]    Wound of left leg       * order for DME     30 pad    STERILE ADHESIVE PAD BANDAGE AT LEAST 4X4 INCH IN SIZE NEEDED FOR DAILY WOUND CARE TO LEFT LOWER LEG. FAX TO [employer] AT " [phone]    Leg wound, left       * order for DME     1 Units    Home blood pressure monitor    Type 2 diabetes mellitus without complication, without long-term current use of insulin (H)       PRO-BIOTIC BLEND PO      Take 1 capsule by mouth daily Enzymatic Therapy-probiotic pearls        rivaroxaban ANTICOAGULANT 20 MG Tabs tablet    XARELTO    30 tablet    Take 1 tablet (20 mg) by mouth daily (with dinner)    Long-term (current) use of anticoagulants, Acute deep vein thrombosis (DVT) of left lower extremity, unspecified vein (H)       traZODone 50 MG tablet    DESYREL    180 tablet    TAKE 1 TO 2 TABLETS(50  MG) BY MOUTH EVERY NIGHT AS NEEDED FOR SLEEP    Insomnia, unspecified type       VITAMIN B-COMPLEX PO           vitamin D 1000 units capsule      Take 2,000 Units by mouth daily        * Notice:  This list has 6 medication(s) that are the same as other medications prescribed for you. Read the directions carefully, and ask your doctor or other care provider to review them with you.

## 2018-04-18 DIAGNOSIS — E78.5 HYPERLIPIDEMIA LDL GOAL <100: ICD-10-CM

## 2018-04-18 NOTE — TELEPHONE ENCOUNTER
"Requested Prescriptions   Pending Prescriptions Disp Refills     atorvastatin (LIPITOR) 80 MG tablet [Pharmacy Med Name: ATORVASTATIN 80MG TABLETS]    Last Written Prescription Date:  4/11/2017  Last Fill Quantity: 90 tablets   ,  # refills: PRN   Last Office Visit: 3/12/2018   Future Office Visit:      90 tablet 0     Sig: TAKE 1 TABLET(80 MG) BY MOUTH DAILY    Statins Protocol Passed    4/18/2018  3:44 AM       Passed - LDL on file in past 12 months    Recent Labs   Lab Test  11/01/17   1457   LDL  70          Passed - No abnormal creatine kinase in past 12 months    No lab results found.          Passed - Recent (12 mo) or future (30 days) visit within the authorizing provider's specialty    Patient had office visit in the last 12 months or has a visit in the next 30 days with authorizing provider or within the authorizing provider's specialty.  See \"Patient Info\" tab in inbasket, or \"Choose Columns\" in Meds & Orders section of the refill encounter.           Passed - Patient is age 18 or older       Passed - No active pregnancy on record       Passed - No positive pregnancy test in past 12 months          "

## 2018-04-19 ENCOUNTER — OFFICE VISIT (OUTPATIENT)
Dept: PSYCHOLOGY | Facility: CLINIC | Age: 63
End: 2018-04-19
Attending: PSYCHOLOGIST
Payer: COMMERCIAL

## 2018-04-19 DIAGNOSIS — F43.20 ADJUSTMENT DISORDER, UNSPECIFIED TYPE: Primary | ICD-10-CM

## 2018-04-19 PROCEDURE — 90834 PSYTX W PT 45 MINUTES: CPT | Mod: ZP | Performed by: PSYCHOLOGIST

## 2018-04-19 PROCEDURE — 40000114 ZZH STATISTIC NO CHARGE CLINIC VISIT

## 2018-04-19 RX ORDER — ATORVASTATIN CALCIUM 80 MG/1
TABLET, FILM COATED ORAL
Qty: 90 TABLET | Refills: 0 | Status: SHIPPED | OUTPATIENT
Start: 2018-04-19 | End: 2018-05-10

## 2018-04-19 NOTE — TELEPHONE ENCOUNTER
Prescription approved per [facility] Refill Protocol.  CHARLENE Aden, BSN, RN

## 2018-04-19 NOTE — MR AVS SNAPSHOT
After Visit Summary   4/19/2018    Hafsa Corona    MRN: [id number]           Patient Information     Date Of Birth          1955        Visit Information        Provider Department      4/19/2018 12:00 PM Lola Vasquez, PhD LP;  2 118 CONSULT [facility]        Today's Diagnoses     Adjustment disorder, unspecified type    -  1       Follow-ups after your visit        Your next 10 appointments already scheduled     Apr 26, 2018 11:00 AM CDT   (Arrive by 10:45 AM)   RETURN WITH ROOM with Lola Vasquez,  LP,  2 118 CONSULT [facility] ([facility] [place])    [address] [place] [address]  [address]  [facility] [id number] [phone]            May 18, 2018   Procedure with Mary Jo Massey MD   [facility] ([facility] [place])    [address] [place] [address]  5th Floor  [facility] [id number] [phone]           Located in the Clinics and Surgery Center at [address] [place] [address].   parking is very convenient and highly recommended.  is a $6 flat rate fee.  Both  and self parkers should enter the main arrival plaza from [facility]; parking attendants will direct you based on your parking preference.            May 18, 2018 11:30 AM CDT   (Arrive by 11:15 AM)   Post-Op with Mary Jo Massey MD   [facility] Ophthalmology ([facility] [place])    [address] [place] [address]  4th Floor  [facility] [id number] [phone]            May 22, 2018 11:15 AM CDT   [facility] Draw with  [facility] DRAW   [facility] Lab Draw ([facility] [place])    [address] [place] [address]  [address]  [facility] [id number] [phone]            May 25, 2018  2:00 PM CDT   (Arrive by 1:45 PM)   Return Visit with MARLON Ogden [facility] ([facility] [place])    [address] [place] [address]  [address]  [facility] [address]   [phone]            Jun 13, 2018 10:30 AM CDT   Post-Op with Mary Jo Massey MD   Eye Clinic ([facility])    Natanael Frazier [address]  9th Fl Clin [address]a  [facility] [address]   [phone]            Sep 21, 2018 12:00 PM CDT   (Arrive by 11:45 AM)   Return Visit with MARLON Chiu   [facility] ([facility] [place])    [address]  [address]  [facility] [address]   [phone]            Sep 21, 2018 12:30 PM CDT   (Arrive by 12:15 PM)   MA SCREENING BILATERAL W/ THAIS with [id number]   [facility] ([facility])    [address]  2nd Floor  [facility] [address]   [phone]           Three-dimensional (3D) mammograms are available at [place] locations in [facility], [facility], [facility], and [place]. [facility] locations include [place] and [facility]. Benefits of 3D mammograms include: - Improved rate of cancer detection - Decreases your chance of having to go back for more tests, which means fewer: - \"False-positive\" results (This means that there is an abnormal area but it isn't cancer.) - Invasive testing procedures, such as a biopsy or surgery - Can provide clearer images of the breast if you have dense breast tissue. 3D mammography is an optional exam that anyone can have with a 2D mammogram. It doesn't replace or take the place of a 2D mammogram. 2D mammograms remain an effective screening test for all women.  Not all insurance companies cover the cost of a 3D mammogram. Check with your insurance.              Who to contact     If you have questions or need follow up information about today's clinic visit or your schedule please contact [facility] directly at [phone].  Normal or non-critical lab and imaging results will " be communicated to you by [employer], letter or phone within 4 business days after the clinic has received the results. If you do not hear from us within 7 days, please contact the clinic through [employer]Central or phone. If you have a critical or abnormal lab result, we will notify you by phone as soon as possible.  Submit refill requests through PrivacyCentral or call your pharmacy and they will forward the refill request to us. Please allow 3 business days for your refill to be completed.          Additional Information About Your Visit        PrivacyCentral Information     [employer]Central gives you secure access to your electronic health record. If you see a primary care provider, you can also send messages to your care team and make appointments. If you have questions, please call your primary care clinic.  If you do not have a primary care provider, please call [phone] and they will assist you.        Care EveryWhere ID     This is your Care EveryWhere ID. This could be used by other organizations to access your [facility] medical records  [id number]        Your Vitals Were     Last Period                   01/01/2009            Blood Pressure from Last 3 Encounters:   03/16/18 104/70   03/12/18 92/70   03/02/18 (!) 88/59    Weight from Last 3 Encounters:   03/16/18 86.1 kg (189 lb 12.8 oz)   02/26/18 83.8 kg (184 lb 12.8 oz)   12/01/17 85.7 kg (189 lb)              Today, you had the following     No orders found for display         Today's Medication Changes          These changes are accurate as of 4/19/18  7:31 PM.  If you have any questions, ask your nurse or doctor.               These medicines have changed or have updated prescriptions.        Dose/Directions    metFORMIN 500 MG 24 hr tablet   Commonly known as:  GLUCOPHAGE-XR   This may have changed:    - how much to take  - when to take this  - additional instructions   Used for:  Type 2 diabetes mellitus without complication, without long-term current use of insulin (H)         Take two tabs by mouth once daily with evening meal.   Quantity:  180 tablet   Refills:  PRN                Primary Care Provider Office Phone # Fax #    [place] HERRERA ATTILA Ayon [phone] [address] [place]  [facility] [address]        Equal Access to Services     RONEY ARANDA : Hadii aad ku hadasho Soomaali, waaxda luqadaha, qaybta kaalmada adeegyada, waxay idiin hayaan adewood khgeorgiesh latamikan . So [facility] [phone].    ATENCIÓN: Si habla español, tiene a martínez disposición servicios gratuitos de asistencia lingüística. Benjamíname al [phone].    We comply with applicable federal civil rights laws and [place] laws. We do not discriminate on the basis of race, color, national origin, age, disability, sex, sexual orientation, or gender identity.            Thank you!     Thank you for choosing [facility]  for your care. Our goal is always to provide you with excellent care. Hearing back from our patients is one way we can continue to improve our services. Please take a few minutes to complete the written survey that you may receive in the mail after your visit with us. Thank you!             Your Updated Medication List - Protect others around you: Learn how to safely use, store and throw away your medicines at www.disposemymeds.org.          This list is accurate as of 4/19/18  7:31 PM.  Always use your most recent med list.                   Brand Name Dispense Instructions for use Diagnosis    atorvastatin 80 MG tablet    LIPITOR    90 tablet    TAKE 1 TABLET(80 MG) BY MOUTH DAILY    Hyperlipidemia LDL goal <100       blood glucose lancets standard    no brand specified    100 each    Use to test blood sugar 2 times daily or as directed.    Type 2 diabetes mellitus without complication, without long-term current use of insulin (H)       blood glucose monitoring meter device kit    no brand specified    1 kit    Use to test blood sugar 2 times daily or as directed.    Type 2  diabetes mellitus without complication, without long-term current use of insulin (H)       blood glucose monitoring test strip    no brand specified    100 strip    Use to test blood sugars 2 times daily or as directed    Type 2 diabetes mellitus without complication, without long-term current use of insulin (H)       CALCIUM-MAGNESIUM-VITAMIN D PO      Take 2 tablets by mouth daily        cephALEXin 500 MG capsule    KEFLEX    21 capsule    Take 1 capsule (500 mg) by mouth 3 times daily    Infected sebaceous cyst of skin       EPINEPHrine 0.3 MG/0.3ML injection 2-pack    EPIPEN/ADRENACLICK/or ANY BX GENERIC EQUIV    1 each    Inject 0.3 mLs (0.3 mg) into the muscle once as needed for anaphylaxis    Bee allergy status       escitalopram 20 MG tablet    LEXAPRO    30 tablet    Take 1 tablet (20 mg) by mouth daily    Major depressive disorder, recurrent episode, mild (H)       fluticasone 50 MCG/ACT spray    FLONASE    48 mL    SHAKE WELL AND USE 2 SPRAYS IN EACH NOSTRIL DAILY    Chronic maxillary sinusitis       lisinopril 10 MG tablet    PRINIVIL/ZESTRIL    90 tablet    Take 1 tablet (10 mg) by mouth daily    Essential hypertension       loratadine 10 MG tablet    CLARITIN    30 tablet    Take 1 tablet (10 mg) by mouth daily    Pain in joint, multiple sites       LORazepam 1 MG tablet    ATIVAN    30 tablet    Take 1 tablet (1 mg) by mouth every 6 hours as needed (nausea/vomiting, anxiety or sleep)    Ovarian cancer, left (H), Encounter for long-term (current) use of medications       metFORMIN 500 MG 24 hr tablet    GLUCOPHAGE-XR    180 tablet    Take two tabs by mouth once daily with evening meal.    Type 2 diabetes mellitus without complication, without long-term current use of insulin (H)       MULTIVITAMIN ADULT PO      Take 1 tablet by mouth daily        order for DME     1 Units    Equipment being ordered: blood pressure monitor    Essential hypertension       * order for DME     1 each    Compression  "stockings 20-30 mm Hg. To be wear when directed by Hematology team and while awake for the first two years post clot.    Long-term (current) use of anticoagulants, Acute deep vein thrombosis (DVT) of left lower extremity, unspecified vein (H)       * order for DME     1 Bottle    Plain packing strip 1/4\"    Abscess of leg       * order for DME     1 Box    Sterile zoey tipped applicators    Abscess of leg       * order for DME     3 Bottle    USE 1/4 INCH  WIDTH PLAIN NU GAUZE PACKING TO DO SELF WOUND CARE OF LEFT LOWER LEG ONCE DAILY. DIMENSIONS OF WOUND PRESENTLY ARE 2 X 2 INCH AND APPROXIMATELY 1/2 INCH DEEP.  USES 6 INCHES OF PACKING CURRENTLY FOR DRESSING CHANGE.  FAX TO [facility] -[phone]    Wound of left leg       * order for DME     30 pad    STERILE ADHESIVE PAD BANDAGE AT LEAST 4X4 INCH IN SIZE NEEDED FOR DAILY WOUND CARE TO LEFT LOWER LEG. FAX TO [facility] -[phone]    Leg wound, left       * order for DME     1 Units    Home blood pressure monitor    Type 2 diabetes mellitus without complication, without long-term current use of insulin (H)       PRO-BIOTIC BLEND PO      Take 1 capsule by mouth daily Enzymatic Therapy-probiotic pearls        rivaroxaban ANTICOAGULANT 20 MG Tabs tablet    XARELTO    30 tablet    Take 1 tablet (20 mg) by mouth daily (with dinner)    Long-term (current) use of anticoagulants, Acute deep vein thrombosis (DVT) of left lower extremity, unspecified vein (H)       traZODone 50 MG tablet    DESYREL    180 tablet    TAKE 1 TO 2 TABLETS(50  MG) BY MOUTH EVERY NIGHT AS NEEDED FOR SLEEP    Insomnia, unspecified type       VITAMIN B-COMPLEX PO           vitamin D 1000 units capsule      Take 2,000 Units by mouth daily        * Notice:  This list has 6 medication(s) that are the same as other medications prescribed for you. Read the directions carefully, and ask your doctor or other care provider to review them with you.      "

## 2018-04-20 NOTE — PROGRESS NOTES
"PSYCHOLOGY PROGRESS NOTE  #6.  Seen for 50 min.  INDIV TH  Inter: pr solving, support.  DX F43.23  [facility] Mood variability  S \"calmer. and space-out-ness\" \"Prepare and Process. Wrote  (for this organization)\" \"[employer] journey. . \"  O  Animated, spontaneous.  Pleased by new opportunities associated with work and with writing.  Continues to work part-time.  RIAZ  Describes having many more positive emotional experiences but with some reflective thoughts about the challenges she has  experienced the past many months.  Balance in her life has improved significantly, somewhat to her own surprise.  There are many opportunities she is having that are rewarding.  However, she remains sensitive to the health and family difficulties which preceeded.  Looking forward more than backwards.  Goal  Continue to invest in positive and rewarding opportunities - these do not diminish the challenges she has experienced but provide optimism for the future and energy in the present.  Plan  RT 1 wk.  TP  9.12.2017  "

## 2018-04-26 ENCOUNTER — OFFICE VISIT (OUTPATIENT)
Dept: PSYCHOLOGY | Facility: CLINIC | Age: 63
End: 2018-04-26
Attending: PSYCHOLOGIST
Payer: COMMERCIAL

## 2018-04-26 DIAGNOSIS — Z79.01 LONG-TERM (CURRENT) USE OF ANTICOAGULANTS: ICD-10-CM

## 2018-04-26 DIAGNOSIS — F43.20 ADJUSTMENT DISORDER, UNSPECIFIED TYPE: Primary | ICD-10-CM

## 2018-04-26 DIAGNOSIS — I82.402 ACUTE DEEP VEIN THROMBOSIS (DVT) OF LEFT LOWER EXTREMITY, UNSPECIFIED VEIN (H): ICD-10-CM

## 2018-04-26 PROCEDURE — 40000114 ZZH STATISTIC NO CHARGE CLINIC VISIT

## 2018-04-26 PROCEDURE — 90834 PSYTX W PT 45 MINUTES: CPT | Mod: ZP | Performed by: PSYCHOLOGIST

## 2018-04-26 NOTE — MR AVS SNAPSHOT
After Visit Summary   4/26/2018    Hafsa Corona    MRN: [id number]           Patient Information     Date Of Birth          1955        Visit Information        Provider Department      4/26/2018 11:00 AM Lola Vasquez, PhD LP;  [facility]        Today's Diagnoses     Adjustment disorder, unspecified type    -  1       Follow-ups after your visit        Your next 10 appointments already scheduled     May 10, 2018  2:45 PM CDT   Nicolet Long with Morelia Ayon NP   [facility]    [address] [place] [id number]   [phone]            May 18, 2018   Procedure with Mary Jo Massey MD   [facility] ([facility])    [address] [place] [address]  5th Floor  [facility] [address] [phone]           Located in the Clinics and Surgery Center at [address] [place] [address].   parking is very convenient and highly recommended.  is a $6 flat rate fee.  Both  and self parkers should enter the main arrival plaza from [facility]; parking attendants will direct you based on your parking preference.            May 18, 2018 11:30 AM CDT   (Arrive by 11:15 AM)   Post-Op with Mary Jo Massey MD   [facility] Ophthalmology ([facility])    [address] [place] [address]  4th Floor  [facility] [id number] [phone]            May 22, 2018 11:15 AM CDT   [facility] Draw with  [facility] LAB DRAW   [facility] Lab Draw ([facility])    [address] [place] [address]  [address]  [facility] [id number] [phone]            May 25, 2018  2:00 PM CDT   (Arrive by 1:45 PM)   Return Visit with MARLON Ogden [facility] ([facility])    [address] [place] [address]  [address]  [facility] [id number] [phone]            Jun 13, 2018  "10:30 AM CDT   Post-Op with Mary Jo Massey MD   [facility] Clinic ([facility])    [facility]  [address]  9th Fl Clin [address]a  [facility] [address] [phone]            Sep 21, 2018 12:00 PM CDT   (Arrive by 11:45 AM)   Return Visit with MARLON Chiu   [facility] ([facility] [place])    [address]  [address]  [facility] [id number] [phone]            Sep 21, 2018 12:30 PM CDT   (Arrive by 12:15 PM)   MA SCREENING BILATERAL W/ THAIS with [email or web address]   [facility] ([facility])    [address]  2nd Floor  [facility] [id number] [phone]           Three-dimensional (3D) mammograms are available at [place] locations in [facility], [place], [place], [facility], [place], [place], and [place]. [facility] locations include [place] and [facility] in [place]. Benefits of 3D mammograms include: - Improved rate of cancer detection - Decreases your chance of having to go back for more tests, which means fewer: - \"False-positive\" results (This means that there is an abnormal area but it isn't cancer.) - Invasive testing procedures, such as a biopsy or surgery - Can provide clearer images of the breast if you have dense breast tissue. 3D mammography is an optional exam that anyone can have with a 2D mammogram. It doesn't replace or take the place of a 2D mammogram. 2D mammograms remain an effective screening test for all women.  Not all insurance companies cover the cost of a 3D mammogram. Check with your insurance.              Who to contact     If you have questions or need follow up information about today's clinic visit or your schedule please contact [facility] directly at [phone].  Normal or non-critical lab and imaging results will be communicated to you by MyChart, letter or phone within 4 business " days after the clinic has received the results. If you do not hear from us within 7 days, please contact the clinic through [employer] or phone. If you have a critical or abnormal lab result, we will notify you by phone as soon as possible.  Submit refill requests through [employer] or call your pharmacy and they will forward the refill request to us. Please allow 3 business days for your refill to be completed.          Additional Information About Your Visit        Nutshell Information     N[employer]shell gives you secure access to your electronic health record. If you see a primary care provider, you can also send messages to your care team and make appointments. If you have questions, please call your primary care clinic.  If you do not have a primary care provider, please call [phone] and they will assist you.        Care EveryWhere ID     This is your Care EveryWhere ID. This could be used by other organizations to access your [place] medical records  [id number]        Your Vitals Were     Last Period                   01/01/2009            Blood Pressure from Last 3 Encounters:   03/16/18 104/70   03/12/18 92/70   03/02/18 (!) 88/59    Weight from Last 3 Encounters:   03/16/18 86.1 kg (189 lb 12.8 oz)   02/26/18 83.8 kg (184 lb 12.8 oz)   12/01/17 85.7 kg (189 lb)              Today, you had the following     No orders found for display         Today's Medication Changes          These changes are accurate as of 4/26/18 11:59 PM.  If you have any questions, ask your nurse or doctor.               These medicines have changed or have updated prescriptions.        Dose/Directions    metFORMIN 500 MG 24 hr tablet   Commonly known as:  GLUCOPHAGE-XR   This may have changed:    - how much to take  - when to take this  - additional instructions   Used for:  Type 2 diabetes mellitus without complication, without long-term current use of insulin (H)        Take two tabs by mouth once daily with evening meal.   Quantity:   180 tablet   Refills:  PRN            Where to get your medicines      These medications were sent to [id number] - [place], [place] - [address] AT [facility]  [address] [place] [address]    Hours:  24-hours Phone:  [phone]     rivaroxaban ANTICOAGULANT 20 MG Tabs tablet                Primary Care Provider Office Phone # Fax #    Morelia Ayon, [phone] [address] [place] [address]  [facility] [address]        Equal Access to Services     [facility] : Hadii aad ku hadasho Soomaali, waaxda luqadaha, qaybta kaalmada adeegyada, waxay idiin hayaan adeeg kharaken betts . So [facility] [phone].    ATENCIÓN: Si habla español, tiene a martínez disposición servicios gratuitos de asistencia lingüística. [facility] [phone].    We comply with applicable federal civil rights laws and [place] laws. We do not discriminate on the basis of race, color, national origin, age, disability, sex, sexual orientation, or gender identity.            Thank you!     Thank you for choosing [facility]  for your care. Our goal is always to provide you with excellent care. Hearing back from our patients is one way we can continue to improve our services. Please take a few minutes to complete the written survey that you may receive in the mail after your visit with us. Thank you!             Your Updated Medication List - Protect others around you: Learn how to safely use, store and throw away your medicines at www.disposemymeds.org.          This list is accurate as of 4/26/18 11:59 PM.  Always use your most recent med list.                   Brand Name Dispense Instructions for use Diagnosis    atorvastatin 80 MG tablet    LIPITOR    90 tablet    TAKE 1 TABLET(80 MG) BY MOUTH DAILY    Hyperlipidemia LDL goal <100       blood glucose lancets standard    no brand specified    100 each    Use to test blood sugar 2 times daily or as directed.    Type 2  diabetes mellitus without complication, without long-term current use of insulin (H)       blood glucose monitoring meter device kit    no brand specified    1 kit    Use to test blood sugar 2 times daily or as directed.    Type 2 diabetes mellitus without complication, without long-term current use of insulin (H)       blood glucose monitoring test strip    no brand specified    100 strip    Use to test blood sugars 2 times daily or as directed    Type 2 diabetes mellitus without complication, without long-term current use of insulin (H)       CALCIUM-MAGNESIUM-VITAMIN D PO      Take 2 tablets by mouth daily        cephALEXin 500 MG capsule    KEFLEX    21 capsule    Take 1 capsule (500 mg) by mouth 3 times daily    Infected sebaceous cyst of skin       EPINEPHrine 0.3 MG/0.3ML injection 2-pack    EPIPEN/ADRENACLICK/or ANY BX GENERIC EQUIV    1 each    Inject 0.3 mLs (0.3 mg) into the muscle once as needed for anaphylaxis    Bee allergy status       escitalopram 20 MG tablet    LEXAPRO    30 tablet    Take 1 tablet (20 mg) by mouth daily    Major depressive disorder, recurrent episode, mild (H)       fluticasone 50 MCG/ACT spray    FLONASE    48 mL    SHAKE WELL AND USE 2 SPRAYS IN EACH NOSTRIL DAILY    Chronic maxillary sinusitis       lisinopril 10 MG tablet    PRINIVIL/ZESTRIL    90 tablet    Take 1 tablet (10 mg) by mouth daily    Essential hypertension       loratadine 10 MG tablet    CLARITIN    30 tablet    Take 1 tablet (10 mg) by mouth daily    Pain in joint, multiple sites       LORazepam 1 MG tablet    ATIVAN    30 tablet    Take 1 tablet (1 mg) by mouth every 6 hours as needed (nausea/vomiting, anxiety or sleep)    Ovarian cancer, left (H), Encounter for long-term (current) use of medications       metFORMIN 500 MG 24 hr tablet    GLUCOPHAGE-XR    180 tablet    Take two tabs by mouth once daily with evening meal.    Type 2 diabetes mellitus without complication, without long-term current use of insulin  "(H)       MULTIVITAMIN ADULT PO      Take 1 tablet by mouth daily        order for DME     1 Units    Equipment being ordered: blood pressure monitor    Essential hypertension       * order for DME     1 each    Compression stockings 20-30 mm Hg. To be wear when directed by Hematology team and while awake for the first two years post clot.    Long-term (current) use of anticoagulants, Acute deep vein thrombosis (DVT) of left lower extremity, unspecified vein (H)       * order for DME     1 Bottle    Plain packing strip 1/4\"    Abscess of leg       * order for DME     1 Box    Sterile zoey tipped applicators    Abscess of leg       * order for DME     3 Bottle    USE 1/4 INCH  WIDTH PLAIN NU GAUZE PACKING TO DO SELF WOUND CARE OF LEFT LOWER LEG ONCE DAILY. DIMENSIONS OF WOUND PRESENTLY ARE 2 X 2 INCH AND APPROXIMATELY 1/2 INCH DEEP.  USES 6 INCHES OF PACKING CURRENTLY FOR DRESSING CHANGE.  FAX TO [facility] [phone]    Wound of left leg       * order for DME     30 pad    STERILE ADHESIVE PAD BANDAGE AT LEAST 4X4 INCH IN SIZE NEEDED FOR DAILY WOUND CARE TO LEFT LOWER LEG. FAX TO [facility] [phone]    Leg wound, left       * order for DME     1 Units    Home blood pressure monitor    Type 2 diabetes mellitus without complication, without long-term current use of insulin (H)       PRO-BIOTIC BLEND PO      Take 1 capsule by mouth daily Enzymatic Therapy-probiotic pearls        rivaroxaban ANTICOAGULANT 20 MG Tabs tablet    XARELTO    30 tablet    Take 1 tablet (20 mg) by mouth daily (with dinner)    Long-term (current) use of anticoagulants, Acute deep vein thrombosis (DVT) of left lower extremity, unspecified vein (H)       traZODone 50 MG tablet    DESYREL    180 tablet    TAKE 1 TO 2 TABLETS(50  MG) BY MOUTH EVERY NIGHT AS NEEDED FOR SLEEP    Insomnia, unspecified type       VITAMIN B-COMPLEX PO           vitamin D 1000 units capsule      Take 2,000 Units by mouth daily        * Notice:  " This list has 6 medication(s) that are the same as other medications prescribed for you. Read the directions carefully, and ask your doctor or other care provider to review them with you.

## 2018-04-28 DIAGNOSIS — E11.9 TYPE 2 DIABETES MELLITUS WITHOUT COMPLICATION, WITHOUT LONG-TERM CURRENT USE OF INSULIN (H): ICD-10-CM

## 2018-04-28 NOTE — TELEPHONE ENCOUNTER
Requested Prescriptions   Pending Prescriptions Disp Refills     metFORMIN (GLUCOPHAGE-XR) 500 MG 24 hr tablet [Pharmacy Med Name: METFORMIN ER 500MG 24HR TABS]  Last Written Prescription Date:  4/11/2017  Last Fill Quantity: 180 tabs,  # refills: PRN   Last office visit: 3/12/2018 with prescribing provider:  Artur Bal   Future Office Visit:   Next 5 appointments (look out 90 days)     May 10, 2018  2:45 PM CDT   MyChart Long with Morelia Ayon NP   [facility]    [address] [facility] [address]   [phone]                 4/11/2017 180 tablet 0     Sig: TAKE 2 TABLETS BY MOUTH ONCE DAILY WITH EVENING MEAL    Biguanide Agents Failed    4/28/2018  3:45 AM       Failed - Patient has documented A1c within the specified period of time.    Recent Labs   Lab Test  08/31/17   0749   A1C  6.1*            Passed - Blood pressure less than 140/90 in past 6 months    BP Readings from Last 3 Encounters:   03/16/18 104/70   03/12/18 92/70   03/02/18 (!) 88/59                Passed - Patient has documented LDL within the past 12 mos.    Recent Labs   Lab Test  11/01/17   1457   LDL  70            Passed - Patient has had a Microalbumin in the past 12 mos.    Recent Labs   Lab Test  11/01/17   1510   MICROL  8   UMALCR  5.72            Passed - Patient is age 10 or older       Passed - Patient's CR is NOT>1.4 OR Patient's EGFR is NOT<45 within past 12 mos.    Recent Labs   Lab Test  03/22/18   1311   GFRESTIMATED  48*   GFRESTBLACK  57*       Recent Labs   Lab Test  03/22/18   1311   CR  1.15*            Passed - Patient does NOT have a diagnosis of CHF.       Passed - Patient is not pregnant       Passed - Patient has not had a positive pregnancy test within the past 12 mos.        Passed - Recent (6 mo) or future (30 days) visit within the authorizing provider's specialty    Patient had office visit in the last 6 months or has a visit in the next 30 days  "with authorizing provider or within the authorizing provider's specialty.  See \"Patient Info\" tab in inbasket, or \"Choose Columns\" in Meds & Orders section of the refill encounter.              "

## 2018-04-29 NOTE — PROGRESS NOTES
"PSYCHOLOGY PROGRESS NOTE #7  Seen for 50 min.  INDIV TH    INDIVIDUAL TH:: pr solving, support.  DX F43,23  Rule out: anxiety disorder  Pr Mood variability  S  \"good. . Reclaiming my lige. . cataract surgery May 18th on my left eye. . Work going beautifully. . Still the insurance, disability 60% . . New . . Haven't done the lymphedema work. . Briefly suicide ideation (re return to work before September}. . For just a moment . . Last May/June suicide ideation with family, finances, blood clot\" \"many appointments. . 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  Anxious about less than slow work return. Some focus on disability hrs:work hrs.  Committed to slow return to work, work 20hrs/wk and working full-time in September.  A  Returning to work slowly has been v successful for Ms Corona - the possibility of returning full-time prior to September (her goal) has evidently not been considered by her.  A new  seems to have suggested she may be ready to return; her surgeon (Dr. Dick) at [place] supported this.  She is uncertain about her medical providers at [facility].  We discussed that I would support their position based on their medical knowledge of her health.  I do not know their position at this time - I suggested her ealth providers could suggest an earlier return than September and this is a reality to consider.  NOTE>  Her mention of brief suicidal ideation and her tearfulness is in MARKED contrast to her mood throughout April.  GOAL.  Consider realistic options.  Plan RT 2-3 wks.  TP  9.12.2017  "

## 2018-04-30 ENCOUNTER — TELEPHONE (OUTPATIENT)
Dept: HEMATOLOGY | Facility: CLINIC | Age: 63
End: 2018-04-30

## 2018-04-30 NOTE — TELEPHONE ENCOUNTER
CjHafsa  MRN:   [id number]  Female, 63 year old, 1955     Hafsa called stating that she would be having cataract surgeries, the first 5/18/18.  Discussed with Dr. Carvalho who did not think she needed to hold her Xarelto prior to her procedures.  Nevaeh Travis, RN, MSN -Nurse Clinician, [facility] [phone]

## 2018-05-02 RX ORDER — METFORMIN HCL 500 MG
TABLET, EXTENDED RELEASE 24 HR ORAL
Qty: 180 TABLET | Refills: 0 | Status: SHIPPED | OUTPATIENT
Start: 2018-05-02 | End: 2018-05-10

## 2018-05-09 ENCOUNTER — MYC MEDICAL ADVICE (OUTPATIENT)
Dept: OPHTHALMOLOGY | Facility: CLINIC | Age: 63
End: 2018-05-09

## 2018-05-10 ENCOUNTER — OFFICE VISIT (OUTPATIENT)
Dept: FAMILY MEDICINE | Facility: CLINIC | Age: 63
End: 2018-05-10
Payer: COMMERCIAL

## 2018-05-10 VITALS
SYSTOLIC BLOOD PRESSURE: 111 MMHG | TEMPERATURE: 98.4 F | HEART RATE: 91 BPM | OXYGEN SATURATION: 100 % | RESPIRATION RATE: 18 BRPM | BODY MASS INDEX: 34.37 KG/M2 | WEIGHT: 194 LBS | DIASTOLIC BLOOD PRESSURE: 74 MMHG

## 2018-05-10 DIAGNOSIS — M25.561 PAIN IN BOTH KNEES, UNSPECIFIED CHRONICITY: ICD-10-CM

## 2018-05-10 DIAGNOSIS — C64.1 RENAL CELL CARCINOMA, RIGHT (H): ICD-10-CM

## 2018-05-10 DIAGNOSIS — M25.562 PAIN IN BOTH KNEES, UNSPECIFIED CHRONICITY: ICD-10-CM

## 2018-05-10 DIAGNOSIS — I10 ESSENTIAL HYPERTENSION: ICD-10-CM

## 2018-05-10 DIAGNOSIS — Z01.818 PREOP GENERAL PHYSICAL EXAM: Primary | ICD-10-CM

## 2018-05-10 DIAGNOSIS — E78.5 HYPERLIPIDEMIA LDL GOAL <100: ICD-10-CM

## 2018-05-10 DIAGNOSIS — Z91.030 BEE ALLERGY STATUS: ICD-10-CM

## 2018-05-10 DIAGNOSIS — E11.9 TYPE 2 DIABETES MELLITUS WITHOUT COMPLICATION, WITHOUT LONG-TERM CURRENT USE OF INSULIN (H): ICD-10-CM

## 2018-05-10 DIAGNOSIS — H26.9 CATARACT, UNSPECIFIED CATARACT TYPE, UNSPECIFIED LATERALITY: ICD-10-CM

## 2018-05-10 LAB
HBA1C MFR BLD: 5.6 % (ref 0–5.6)
HGB BLD-MCNC: 11.1 G/DL (ref 11.7–15.7)

## 2018-05-10 PROCEDURE — 36415 COLL VENOUS BLD VENIPUNCTURE: CPT | Performed by: NURSE PRACTITIONER

## 2018-05-10 PROCEDURE — 80053 COMPREHEN METABOLIC PANEL: CPT | Performed by: NURSE PRACTITIONER

## 2018-05-10 PROCEDURE — 85018 HEMOGLOBIN: CPT | Performed by: NURSE PRACTITIONER

## 2018-05-10 PROCEDURE — 83036 HEMOGLOBIN GLYCOSYLATED A1C: CPT | Performed by: NURSE PRACTITIONER

## 2018-05-10 PROCEDURE — 99215 OFFICE O/P EST HI 40 MIN: CPT | Performed by: NURSE PRACTITIONER

## 2018-05-10 RX ORDER — ATORVASTATIN CALCIUM 80 MG/1
TABLET, FILM COATED ORAL
Qty: 90 TABLET | Status: SHIPPED | OUTPATIENT
Start: 2018-05-10 | End: 2019-09-27

## 2018-05-10 RX ORDER — METFORMIN HCL 500 MG
TABLET, EXTENDED RELEASE 24 HR ORAL
Qty: 180 TABLET | Status: SHIPPED | OUTPATIENT
Start: 2018-05-10 | End: 2019-06-04

## 2018-05-10 RX ORDER — EPINEPHRINE 0.3 MG/.3ML
0.3 INJECTION SUBCUTANEOUS
Qty: 0.3 ML | Status: SHIPPED | OUTPATIENT
Start: 2018-05-10 | End: 2022-06-08

## 2018-05-10 RX ORDER — LISINOPRIL 10 MG/1
10 TABLET ORAL DAILY
Qty: 90 TABLET | Status: SHIPPED | OUTPATIENT
Start: 2018-05-10 | End: 2019-04-12

## 2018-05-10 NOTE — PATIENT INSTRUCTIONS
Before Your Surgery      Call your surgeon if there is any change in your health. This includes signs of a cold or flu (such as a sore throat, runny nose, cough, rash or fever).    Do not smoke, drink alcohol or take over the counter medicine (unless your surgeon or primary care doctor tells you to) for the 24 hours before and after surgery.    If you take prescribed drugs: Follow your doctor s orders about which medicines to take and which to stop until after surgery.    Eating and drinking prior to surgery: follow the instructions from your surgeon    Take a shower or bath the night before surgery. Use the soap your surgeon gave you to gently clean your skin. If you do not have soap from your surgeon, use your regular soap. Do not shave or scrub the surgery site.  Wear clean pajamas and have clean sheets on your bed.     Before Your Surgery      Call your surgeon if there is any change in your health. This includes signs of a cold or flu (such as a sore throat, runny nose, cough, rash or fever).    Do not smoke, drink alcohol or take over the counter medicine (unless your surgeon or primary care doctor tells you to) for the 24 hours before and after surgery.    If you take prescribed drugs: Follow your doctor s orders about which medicines to take and which to stop until after surgery.    Eating and drinking prior to surgery: follow the instructions from your surgeon    Take a shower or bath the night before surgery. Use the soap your surgeon gave you to gently clean your skin. If you do not have soap from your surgeon, use your regular soap. Do not shave or scrub the surgery site.  Wear clean pajamas and have clean sheets on your bed.     Before Your Surgery      Call your surgeon if there is any change in your health. This includes signs of a cold or flu (such as a sore throat, runny nose, cough, rash or fever).    Do not smoke, drink alcohol or take over the counter medicine (unless your surgeon or primary care  doctor tells you to) for the 24 hours before and after surgery.    If you take prescribed drugs: Follow your doctor s orders about which medicines to take and which to stop until after surgery.    Eating and drinking prior to surgery: follow the instructions from your surgeon    Take a shower or bath the night before surgery. Use the soap your surgeon gave you to gently clean your skin. If you do not have soap from your surgeon, use your regular soap. Do not shave or scrub the surgery site.  Wear clean pajamas and have clean sheets on your bed.

## 2018-05-10 NOTE — PROGRESS NOTES
Pt had appointment in 1st floor lab for port flush. Staff does not do this type of appointment so pt brought so [facility] on 2nd floor for this. It was after 7pm and there were no longer schedulers here to make [facility] appointment so writer charted under lab appointment. Pt verified with name and . Right chest wall port accessed using sterile technique. Blood return obtained right away. Port flushed with 20ml NS and then locked with heparin 500units. Port needle de accessed and pt discharged to home.

## 2018-05-10 NOTE — PROGRESS NOTES
[address] [address]  [phone]  Dept: [phone]    PRE-OP EVALUATION:  Today's date: 5/10/2018    Hafsa Corona (: 1955) presents for pre-operative evaluation assessment as requested by  ***.  She requires evaluation and anesthesia risk assessment prior to undergoing surgery/procedure for treatment of *** .    {PREOP QUESTIONNAIRE OPTIONS (by MA):406313}    HPI:     HPI related to upcoming procedure: ***      {Ch. 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) 09/15/2017     Priority: Medium     Pain in joint, multiple sites 09/15/2017     Priority: Medium     Ovarian cancer (H) 2017     Priority: Medium     Ovarian cancer, left (H) 2017     Priority: Medium     Encounter for long-term (current) use of medications 2017     Priority: Medium     S/P laparotomy 2017     Priority: Medium     Pelvic mass in female 2017     Priority: Medium     Long-term (current) use of anticoagulants [Z79.01] 2017     Priority: Medium     Deep vein thrombosis (DVT) (H) [I82.409] 2017     Priority: Medium     Major depressive disorder, recurrent episode, mild (H) 2016     Priority: Medium     Lumbago 2016     Priority: Medium     Essential hypertension 2016     Priority: Medium     BMI > 35 10/14/2015     Priority: Medium     Allergic rhinitis due to other allergen 2011     Priority: Medium     Type 2 diabetes mellitus without complication (H) 10/31/2010     Priority: Medium     HYPERLIPIDEMIA LDL GOAL <100 10/31/2010     Priority: Medium     Chronic Maxillary Sinusitis 2008     Priority: Medium      Tachycardia      Priority: Medium     Attention deficit disorder 07/19/2005     Priority: Medium     Problem list name updated by automated process. Provider to review       PANIC DISORDER  07/19/2005     Priority: Medium     VASOMOTOR RHINITIS 07/19/2005     Priority: Medium      Past Medical History:   Diagnosis Date     Anxiety state, unspecified     [id number]     Arthritis 2014    vague, gradual, not treated really, knees feel it     Attention deficit disorder without mention of hyperactivity      Cancer (H) 7/2017 and 1/2018    ovarian and kidney cancers, both treated well     Chronic rhinitis      Depressive disorder lifetime    plus Anxiety plus ADD. Escitalipram, therapy, ADD meds maybe     Depressive disorder, not elsewhere classified      Heart disease 1999    tachycardia and high cholesterol, managed     History of blood transfusion 7/2017    while in hospital for ovarian cancer     Hypertension 1999    tho BP was too LOW last week. past 12 years Generally OK     Panic disorder without agoraphobia      Pure hypercholesterolemia     Lipitor     Tachycardia, unspecified     9/1999-2/2004     Type II or unspecified type diabetes mellitus without mention of complication, not stated as uncontrolled     diagnosed 2004     Past Surgical History:   Procedure Laterality Date     BIOPSY  7/2017 and 2/2018    ovarian and kidney cancer biopsies. also 1987 breast benign     BREAST SURGERY  1987    date unsure. benign breast cyst removed     COLONOSCOPY  2008 and 2013    polyps removed. Next due fall 2018     HYSTERECTOMY TOTAL ABDOMINAL, BILATERAL SALPINGO-OOPHORECTOMY, NODE DISSECTION, COMBINED Left 7/7/2017    Procedure: COMBINED HYSTERECTOMY TOTAL ABDOMINAL, SALPINGO-OOPHORECTOMY, NODE DISSECTION;  Exploratory Laparotomy, Left Salpingo-Oophorectomy, Cancer Staging, Total Hysterectomy, Omentectomy, Evacuation of abdominal fluid, Lymph Node Dissection  Anesthesia Block ;  Surgeon: Serena Cole MD;   Location: [facility] OR     HYSTERECTOMY, PAP NO LONGER INDICATED  07/07/2017    Laparotomy; for ovarian cancer staging     INSERT PORT VASCULAR ACCESS Right 8/21/2017    Procedure: INSERT PORT VASCULAR ACCESS;  Single Lumen Chest Power Port;  Surgeon: Stephen Mike PA-C;  Location: [facility] OR     LYMPHADENECTOMY RETROPERITONEAL Bilateral 07/07/2017    Laparotomy; pelvics & para-aortics; for ovarian cancer staging     OMENTECTOMY  07/07/2017    Laparotomy; for ovarian cancer staging     ORTHOPEDIC SURGERY  1/2002    broken left jaw due to fall, 4 fractures, titanium plates     SALPINGO OOPHORECTOMY,R/L/KAYY Right 2008    Salpingo Oophorectomy, RT     SALPINGO OOPHORECTOMY,R/L/KAYY Left 07/07/2017    Laparotomy; for ovarian cancer staging     SURGICAL HISTORY OF -       facial surgery d/t fall in 1/2002     SURGICAL HISTORY OF -       1985 removal of breast cyst     SURGICAL HISTORY OF -   2008    endometrial ablation     Current Outpatient Prescriptions   Medication Sig Dispense Refill     atorvastatin (LIPITOR) 80 MG tablet TAKE 1 TABLET(80 MG) BY MOUTH DAILY 90 tablet 0     B Complex Vitamins (VITAMIN B-COMPLEX PO)        blood glucose (NO BRAND SPECIFIED) lancets standard Use to test blood sugar 2 times daily or as directed. 100 each 11     blood glucose monitoring (NO BRAND SPECIFIED) meter device kit Use to test blood sugar 2 times daily or as directed. 1 kit 0     blood glucose monitoring (NO BRAND SPECIFIED) test strip Use to test blood sugars 2 times daily or as directed 100 strip PRN     CALCIUM-MAGNESIUM-VITAMIN D PO Take 2 tablets by mouth daily       cephALEXin (KEFLEX) 500 MG capsule Take 1 capsule (500 mg) by mouth 3 times daily 21 capsule 0     Cholecalciferol (VITAMIN D) 1000 UNIT capsule Take 2,000 Units by mouth daily        EPINEPHrine (EPIPEN) 0.3 MG/0.3ML injection Inject 0.3 mLs (0.3 mg) into the muscle once as needed for anaphylaxis 1 each PRN     escitalopram (LEXAPRO) 20 MG tablet Take 1 tablet  "(20 mg) by mouth daily 30 tablet PRN     fluticasone (FLONASE) 50 MCG/ACT spray SHAKE WELL AND USE 2 SPRAYS IN EACH NOSTRIL DAILY 48 mL PRN     lisinopril (PRINIVIL/ZESTRIL) 10 MG tablet Take 1 tablet (10 mg) by mouth daily 90 tablet PRN     loratadine (CLARITIN) 10 MG tablet Take 1 tablet (10 mg) by mouth daily 30 tablet 1     LORazepam (ATIVAN) 1 MG tablet Take 1 tablet (1 mg) by mouth every 6 hours as needed (nausea/vomiting, anxiety or sleep) 30 tablet 1     metFORMIN (GLUCOPHAGE-XR) 500 MG 24 hr tablet TAKE 2 TABLETS BY MOUTH ONCE DAILY WITH EVENING MEAL 180 tablet 0     Multiple Vitamins-Minerals (MULTIVITAMIN ADULT PO) Take 1 tablet by mouth daily       order for DME Equipment being ordered: blood pressure monitor 1 Units 0     order for DME Compression stockings 20-30 mm Hg. To be wear when directed by Hematology team and while awake for the first two years post clot. 1 each 0     order for DME Plain packing strip 1/4\" 1 Bottle PRN     order for DME Sterile zoey tipped applicators 1 Box PRN     order for DME USE 1/4 INCH  WIDTH PLAIN NU GAUZE PACKING TO DO SELF WOUND CARE OF LEFT LOWER LEG ONCE DAILY.  DIMENSIONS OF WOUND PRESENTLY ARE 2 X 2 INCH AND APPROXIMATELY 1/2 INCH DEEP.  USES 6 INCHES OF PACKING CURRENTLY FOR DRESSING CHANGE.    FAX TO [facility] [employer] [phone] 3 Bottle 2     order for DME STERILE ADHESIVE PAD BANDAGE AT LEAST 4X4 INCH IN SIZE NEEDED FOR DAILY WOUND CARE TO LEFT LOWER LEG.  FAX TO [employer] [phone] 30 pad 1     order for DME Home blood pressure monitor 1 Units 0     Probiotic Product (PRO-BIOTIC BLEND PO) Take 1 capsule by mouth daily Enzymatic Therapy-probiotic pearls       rivaroxaban ANTICOAGULANT (XARELTO) 20 MG TABS tablet Take 1 tablet (20 mg) by mouth daily (with dinner) 30 tablet 3     traZODone (DESYREL) 50 MG tablet TAKE 1 TO 2 TABLETS(50  MG) BY MOUTH EVERY NIGHT AS NEEDED FOR SLEEP 180 tablet 1     OTC products: {OTC ANALGESICS:791912}    Allergies " "  Allergen Reactions     Wasps [Hornets] Anaphylaxis     No Clinical Screening - See Comments      Taxol      Paclitaxel Difficulty breathing     Sulfa Drugs Hives      Latex Allergy: {YES/NO WITH DEFAULT:872512::\"NO\"}    Social History   Substance Use Topics     Smoking status: Never Smoker     Smokeless tobacco: Never Used     Alcohol use Yes      Comment: Very infrequent, a bit of wine or beer 2x per month maybe     History   Drug Use     Yes     Special: Marijuana     Comment: infrequent, for celebrations only, maybe 8x per year       REVIEW OF SYSTEMS:   {ROS Preop Choices:297616}    EXAM:   LMP 01/01/2009  {EXAM Preop Choices:092114}    DIAGNOSTICS:   {DIAGNOSTIC FOR PREOP:462240}    Recent Labs   Lab Test  03/22/18   1311  11/14/17   0949  10/16/17   0824   08/31/17   0749   08/21/17   0810   07/02/17   2255   04/11/17   1125   HGB   --   9.4*  9.7*   < >   --    < >   --    < >  8.6*   < >   --    PLT   --   229  313   < >   --    < >   --    < >  296   < >   --    INR   --    --    --    --    --    --   1.05   --   1.17*   < >   --    NA  138  143  138   < >   --    < >   --    < >  141   < >  141   POTASSIUM  4.1  4.1  4.1   < >   --    < >   --    < >  4.1   < >  4.1   CR  1.15*  0.71  0.67   < >   --    < >   --    < >  0.84   < >  0.75   A1C   --    --    --    --   6.1*   --    --    --    --    --   6.0    < > = values in this interval not displayed.        IMPRESSION:   {PREOP REASONS:459293::\"Reason for surgery/procedure: ***\",\"Diagnosis/reason for consult: ***\"}    The proposed surgical procedure is considered {HIGH=major cardiovascular or procedures requiring prolonged anesthesia >4 hours or large fluid shifts;    INTERMEDIATE=abdominal, most orthopedic and intrathoracic surgery; LOW= endoscopy, cataract and breast surgery:947901} risk.    REVISED CARDIAC RISK INDEX  The patient has the following serious cardiovascular risks for perioperative complications such as (MI, PE, VFib and 3  AV " "Block):  {PREOP REVISED CARDIAC INDEX (RCI):939174:p:\"No serious cardiac risks\"}  INTERPRETATION: {REVISED CARDIAC RISK INTERPRETATION:473028}    The patient has the following additional risks for perioperative complications:  {Additional perioperative risks:904480:p:\"No identified additional risks\"}      ICD-10-CM    1. Preop general physical exam Z01.818        RECOMMENDATIONS:     {IMPORTANT - Conditions - complete carefully!!:085071}    {IMPORTANT - Medications:084339::\"--Patient is to take all scheduled medications on the day of surgery EXCEPT for modifications listed below.\"}    {IMPORTANT - Approval:737606:p:\"APPROVAL GIVEN to proceed with proposed procedure, without further diagnostic evaluation\"}       Signed Electronically by: Morelia Ayon NP    Copy of this evaluation report is provided to requesting physician.    [facility] Preop Guidelines    Revised Cardiac Risk Index  "

## 2018-05-10 NOTE — PROGRESS NOTES
[address] [address]  [phone]  Dept: [phone]    PRE-OP EVALUATION:  Today's date: 5/10/2018    Hafsa Corona (: 1955) presents for pre-operative evaluation assessment as requested by Dr. Massey.  She requires evaluation and anesthesia risk assessment prior to undergoing surgery/procedure for treatment of cataracts.    Fax number for surgical facility: [phone]  Primary Physician: Morelia Ayon  Type of Anesthesia Anticipated: to be determined    Patient has a Health Care Directive or Living Will:  NO    Preop Questions 5/10/2018   Who is doing your surgery? dr massey   What are you having done? cataract removal left eye   Date of Surgery/Procedure:    1.  Do you have a history of Heart attack, stroke, stent, coronary bypass surgery, or other heart surgery? No   2.  Do you ever have any pain or discomfort in your chest? YES - not currently but from panic attack, and tachy cardia    3.  Do you have a history of  Heart Failure? No   4.   Are you troubled by shortness of breath when:  walking on a level surface, or up a slight hill, or at night? No   5.  Do you currently have a cold, bronchitis or other respiratory infection? No   6.  Do you have a cough, shortness of breath, or wheezing? No   7.  Do you sometimes get pains in the calves of your legs when you walk? YES - once in a while on both    8. Do you or anyone in your family have previous history of blood clots? YES - last years    9.  Do you or does anyone in your family have a serious bleeding problem such as prolonged bleeding following surgeries or cuts? No   10. Have you ever had problems with anemia or been told to take iron pills? YES - anemia last summer when pt have cancer     11. Have you had any abnormal blood loss such as black, tarry or bloody stools, or abnormal vaginal bleeding? No   12. Have you ever had a blood transfusion? YES - 2017   13. Have you or  any of your relatives ever had problems with anesthesia? No   14. Do you have sleep apnea, excessive snoring or daytime drowsiness? No   15. Do you have any prosthetic heart valves? No   16. Do you have prosthetic joints? No   17. Is there any chance that you may be pregnant? No         HPI:     HPI related to upcoming procedure: Decreased vision due to cataracts      DIABETES - Patient has a longstanding history of Diabetes Type II . Patient is being treated with oral agents and denies significant side effects. Control has been good. Complicating factors include but are not limited to: hypertension and hyperlipidemia.                                                                                                                            .  HYPERTENSION - Patient has longstanding history of HTN , currently denies any symptoms referable to elevated blood pressure. Specifically denies chest pain, palpitations, dyspnea, orthopnea, PND or peripheral edema. Blood pressure readings have been in normal range. Current medication regimen is as listed below. Patient denies any side effects of medication.                                                                                                                                                                                          .  RENAL INSUFFICIENCY - Patient has a recent history of renal cell carcinoma and right nephrectomy.  Kidney function is mildly decreased, but stable.                                                                                                                                                            .    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) 09/15/2017     Priority: Medium     Pain in joint, multiple sites 09/15/2017     Priority: Medium     Ovarian cancer (H) 08/30/2017     Priority: Medium     Ovarian cancer, left (H) 08/01/2017     Priority: Medium     Encounter for long-term (current) use of medications 08/01/2017     Priority: Medium     S/P laparotomy 07/07/2017     Priority: Medium     Pelvic mass in female 07/03/2017     Priority: Medium     Long-term (current) use of anticoagulants [Z79.01] 06/06/2017     Priority: Medium     Deep vein thrombosis (DVT) (H) [I82.409] 06/06/2017     Priority: Medium     Major depressive disorder, recurrent episode, mild (H) 09/06/2016     Priority: Medium     Lumbago 04/04/2016     Priority: Medium     Essential hypertension 02/09/2016     Priority: Medium     BMI > 35 10/14/2015     Priority: Medium     Allergic rhinitis due to other allergen 12/20/2011     Priority: Medium     Type 2 diabetes mellitus without complication (H) 10/31/2010     Priority: Medium     HYPERLIPIDEMIA LDL GOAL <100 10/31/2010     Priority: Medium     Chronic Maxillary Sinusitis 11/21/2008     Priority: Medium     Tachycardia      Priority: Medium     Attention deficit disorder 07/19/2005     Priority: Medium     Problem list name updated by automated process. Provider to review       PANIC DISORDER  07/19/2005     Priority: Medium     VASOMOTOR RHINITIS 07/19/2005     Priority: Medium      Past Medical History:   Diagnosis Date     Anxiety state, unspecified     [id number]     Arthritis 2014    vague, gradual, not treated really, knees feel it     Attention deficit disorder without mention of hyperactivity      Cancer (H) 7/2017 and 1/2018    ovarian and kidney cancers, both treated well     Chronic rhinitis      Depressive disorder lifetime    plus Anxiety plus ADD. Escitalipram, therapy, ADD meds maybe     Depressive disorder, not elsewhere classified      Heart disease  1999    tachycardia and high cholesterol, managed     History of blood transfusion 7/2017    while in hospital for ovarian cancer     Hypertension 1999    tho BP was too LOW last week. past 12 years Generally OK     Panic disorder without agoraphobia      Pure hypercholesterolemia     Lipitor     Tachycardia, unspecified     9/1999-2/2004     Type II or unspecified type diabetes mellitus without mention of complication, not stated as uncontrolled     diagnosed 2004     Past Surgical History:   Procedure Laterality Date     BIOPSY  7/2017 and 2/2018    ovarian and kidney cancer biopsies. also 1987 breast benign     BREAST SURGERY  1987    date unsure. benign breast cyst removed     COLONOSCOPY  2008 and 2013    polyps removed. Next due fall 2018     HYSTERECTOMY TOTAL ABDOMINAL, BILATERAL SALPINGO-OOPHORECTOMY, NODE DISSECTION, COMBINED Left 7/7/2017    Procedure: COMBINED HYSTERECTOMY TOTAL ABDOMINAL, SALPINGO-OOPHORECTOMY, NODE DISSECTION;  Exploratory Laparotomy, Left Salpingo-Oophorectomy, Cancer Staging, Total Hysterectomy, Omentectomy, Evacuation of abdominal fluid, Lymph Node Dissection  Anesthesia Block ;  Surgeon: Serena oCle MD;  Location: [facility] OR     HYSTERECTOMY, PAP NO LONGER INDICATED  07/07/2017    Laparotomy; for ovarian cancer staging     INSERT PORT VASCULAR ACCESS Right 8/21/2017    Procedure: INSERT PORT VASCULAR ACCESS;  Single Lumen Chest Power Port;  Surgeon: Stephen Mike PA-C;  Location: [facility] OR     LYMPHADENECTOMY RETROPERITONEAL Bilateral 07/07/2017    Laparotomy; pelvics & para-aortics; for ovarian cancer staging     OMENTECTOMY  07/07/2017    Laparotomy; for ovarian cancer staging     ORTHOPEDIC SURGERY  1/2002    broken left jaw due to fall, 4 fractures, titanium plates     SALPINGO OOPHORECTOMY,R/L/KAYY Right 2008    Salpingo Oophorectomy, RT     SALPINGO OOPHORECTOMY,R/L/KAYY Left 07/07/2017    Laparotomy; for ovarian cancer staging     SURGICAL HISTORY OF -       facial  surgery d/t fall in 1/2002     SURGICAL HISTORY OF -       1985 removal of breast cyst     SURGICAL HISTORY OF -   2008    endometrial ablation     Current Outpatient Prescriptions   Medication Sig Dispense Refill     atorvastatin (LIPITOR) 80 MG tablet TAKE 1 TABLET(80 MG) BY MOUTH DAILY 90 tablet PRN     B Complex Vitamins (VITAMIN B-COMPLEX PO)        blood glucose (NO BRAND SPECIFIED) lancets standard Use to test blood sugar 2 times daily or as directed. 100 each 11     blood glucose monitoring (NO BRAND SPECIFIED) meter device kit Use to test blood sugar 2 times daily or as directed. 1 kit 0     blood glucose monitoring (NO BRAND SPECIFIED) test strip Use to test blood sugars 2 times daily or as directed 100 strip PRN     CALCIUM-MAGNESIUM-VITAMIN D PO Take 2 tablets by mouth daily       Cholecalciferol (VITAMIN D) 1000 UNIT capsule Take 2,000 Units by mouth daily        EPINEPHrine (EPIPEN/ADRENACLICK/OR ANY BX GENERIC EQUIV) 0.3 MG/0.3ML injection 2-pack Inject 0.3 mLs (0.3 mg) into the muscle once as needed for anaphylaxis 0.3 mL PRN     escitalopram (LEXAPRO) 20 MG tablet Take 1 tablet (20 mg) by mouth daily 30 tablet PRN     fluticasone (FLONASE) 50 MCG/ACT spray SHAKE WELL AND USE 2 SPRAYS IN EACH NOSTRIL DAILY 48 mL PRN     lisinopril (PRINIVIL/ZESTRIL) 10 MG tablet Take 1 tablet (10 mg) by mouth daily 90 tablet PRN     loratadine (CLARITIN) 10 MG tablet Take 1 tablet (10 mg) by mouth daily 30 tablet 1     LORazepam (ATIVAN) 1 MG tablet Take 1 tablet (1 mg) by mouth every 6 hours as needed (nausea/vomiting, anxiety or sleep) 30 tablet 1     Magnesium Hydroxide (MAGNESIA PO) Take 500 mg by mouth       metFORMIN (GLUCOPHAGE-XR) 500 MG 24 hr tablet TAKE 2 TABLETS BY MOUTH ONCE DAILY WITH EVENING MEAL 180 tablet PRN     Multiple Vitamins-Minerals (MULTIVITAMIN ADULT PO) Take 1 tablet by mouth daily       order for DME Equipment being ordered: blood pressure monitor 1 Units 0     order for DME Home blood  "pressure monitor 1 Units 0     Probiotic Product (PRO-BIOTIC BLEND PO) Take 1 capsule by mouth daily Enzymatic Therapy-probiotic pearls       rivaroxaban ANTICOAGULANT (XARELTO) 20 MG TABS tablet Take 1 tablet (20 mg) by mouth daily (with dinner) 30 tablet 3     traZODone (DESYREL) 50 MG tablet TAKE 1 TO 2 TABLETS(50  MG) BY MOUTH EVERY NIGHT AS NEEDED FOR SLEEP 180 tablet 1     cephALEXin (KEFLEX) 500 MG capsule Take 1 capsule (500 mg) by mouth 3 times daily (Patient not taking: Reported on 5/10/2018) 21 capsule 0     order for DME Compression stockings 20-30 mm Hg. To be wear when directed by Hematology team and while awake for the first two years post clot. (Patient not taking: Reported on 5/10/2018) 1 each 0     order for DME Plain packing strip 1/4\" (Patient not taking: Reported on 5/10/2018) 1 Bottle PRN     order for DME Sterile zoey tipped applicators (Patient not taking: Reported on 5/10/2018) 1 Box PRN     order for DME USE 1/4 INCH  WIDTH PLAIN NU GAUZE PACKING TO DO SELF WOUND CARE OF LEFT LOWER LEG ONCE DAILY.  DIMENSIONS OF WOUND PRESENTLY ARE 2 X 2 INCH AND APPROXIMATELY 1/2 INCH DEEP.  USES 6 INCHES OF PACKING CURRENTLY FOR DRESSING CHANGE.    FAX TO [facility] [phone] (Patient not taking: Reported on 5/10/2018) 3 Bottle 2     order for DME STERILE ADHESIVE PAD BANDAGE AT LEAST 4X4 INCH IN SIZE NEEDED FOR DAILY WOUND CARE TO LEFT LOWER LEG.  FAX TO [employer] [phone] (Patient not taking: Reported on 5/10/2018) 30 pad 1     [DISCONTINUED] atorvastatin (LIPITOR) 80 MG tablet TAKE 1 TABLET(80 MG) BY MOUTH DAILY 90 tablet 0     [DISCONTINUED] lisinopril (PRINIVIL/ZESTRIL) 10 MG tablet Take 1 tablet (10 mg) by mouth daily 90 tablet PRN     [DISCONTINUED] metFORMIN (GLUCOPHAGE-XR) 500 MG 24 hr tablet TAKE 2 TABLETS BY MOUTH ONCE DAILY WITH EVENING MEAL 180 tablet 0     OTC products: None, except as noted above    Allergies   Allergen Reactions     Paclitaxel Anaphylaxis and " Difficulty breathing     Wasps [Hornets] Anaphylaxis     Yellow Hornet Venom [Hornet Venom] Anaphylaxis and Swelling     Yellow Jacket Venom [Venomil Honey Bee] Anaphylaxis and Swelling     No Clinical Screening - See Comments      Taxol      Sulfa Drugs Hives      Latex Allergy: NO    Social History   Substance Use Topics     Smoking status: Never Smoker     Smokeless tobacco: Never Used     Alcohol use Yes      Comment: Very infrequent, a bit of wine or beer 2x per month maybe     History   Drug Use     Yes     Special: Marijuana     Comment: infrequent, for celebrations only, maybe 8x per year       REVIEW OF SYSTEMS:   CONSTITUTIONAL: NEGATIVE for fever, chills, change in weight  INTEGUMENTARY/SKIN: NEGATIVE for worrisome rashes, moles or lesions  EYES: see HPI  ENT/MOUTH: NEGATIVE for ear, mouth and throat problems  RESP: NEGATIVE for significant cough or SOB  BREAST: NEGATIVE for masses, tenderness or discharge  CV: NEGATIVE for chest pain, palpitations or peripheral edema  GI: NEGATIVE for nausea, abdominal pain, heartburn, or change in bowel habits  : NEGATIVE for frequency, dysuria, or hematuria  MUSCULOSKELETAL: NEGATIVE for significant arthralgias or myalgia  NEURO: NEGATIVE for weakness, dizziness or paresthesias  ENDOCRINE: NEGATIVE for temperature intolerance, skin/hair changes  HEME: NEGATIVE for bleeding problems  PSYCHIATRIC: NEGATIVE for changes in mood or affect    EXAM:   /74 (BP Location: Right arm, Patient Position: Sitting, Cuff Size: Adult Regular)  Pulse 91  Temp 98.4  F (36.9  C) (Oral)  Resp 18  Wt 194 lb (88 kg)  LMP 01/01/2009  SpO2 100%  BMI 34.37 kg/m2    GENERAL APPEARANCE: healthy, alert and no distress     EYES: EOMI, PERRL     HENT: ear canals and TM's normal and nose and mouth without ulcers or lesions     NECK: no adenopathy, no asymmetry, masses, or scars and thyroid normal to palpation     RESP: lungs clear to auscultation - no rales, rhonchi or wheezes     CV:  regular rates and rhythm, normal S1 S2, no S3 or S4 and no murmur, click or rub     ABDOMEN:  soft, nontender, no HSM or masses and bowel sounds normal     MS: extremities normal- no gross deformities noted, no evidence of inflammation in joints, FROM in all extremities.     SKIN: no suspicious lesions or rashes     NEURO: Normal strength and tone, sensory exam grossly normal, mentation intact and speech normal     PSYCH: mentation appears normal. and affect normal/bright     LYMPHATICS: No cervical adenopathy    DIAGNOSTICS:     Labs Resulted Today:   Results for orders placed or performed in visit on 05/10/18   Hemoglobin   Result Value Ref Range    Hemoglobin 11.1 (L) 11.7 - 15.7 g/dL   Hemoglobin A1c   Result Value Ref Range    Hemoglobin A1C 5.6 0 - 5.6 %     Labs Drawn and in Process:   Unresulted Labs Ordered in the Past 30 Days of this Admission     Date and Time Order Name Status Description    5/10/2018 1535 COMPREHENSIVE METABOLIC PANEL In process           Recent Labs   Lab Test  03/22/18   1311  11/14/17   0949  10/16/17   0824   08/31/17   0749   08/21/17   0810   07/02/17   2255   04/11/17   1125   HGB   --   9.4*  9.7*   < >   --    < >   --    < >  8.6*   < >   --    PLT   --   229  313   < >   --    < >   --    < >  296   < >   --    INR   --    --    --    --    --    --   1.05   --   1.17*   < >   --    NA  138  143  138   < >   --    < >   --    < >  141   < >  141   POTASSIUM  4.1  4.1  4.1   < >   --    < >   --    < >  4.1   < >  4.1   CR  1.15*  0.71  0.67   < >   --    < >   --    < >  0.84   < >  0.75   A1C   --    --    --    --   6.1*   --    --    --    --    --   6.0    < > = values in this interval not displayed.        IMPRESSION:   Reason for surgery/procedure: Cataract extraction    The proposed surgical procedure is considered LOW risk.    REVISED CARDIAC RISK INDEX  The patient has the following serious cardiovascular risks for perioperative complications such as (MI, PE, VFib  and 3  AV Block):  No serious cardiac risks  INTERPRETATION: 0 risks: Class I (very low risk - 0.4% complication rate)    The patient has the following additional risks for perioperative complications:        ICD-10-CM    1. Preop general physical exam Z01.818 Hemoglobin   2. Cataract, unspecified cataract type, unspecified laterality H26.9    3. Renal cell carcinoma, right (H) C64.1 Comprehensive metabolic panel   4. Type 2 diabetes mellitus without complication, without long-term current use of insulin (H) E11.9 Comprehensive metabolic panel     Hemoglobin A1c     metFORMIN (GLUCOPHAGE-XR) 500 MG 24 hr tablet   5. Essential hypertension I10 lisinopril (PRINIVIL/ZESTRIL) 10 MG tablet   6. Hyperlipidemia LDL goal <100 E78.5 atorvastatin (LIPITOR) 80 MG tablet   7. Pain in both knees, unspecified chronicity M25.561 ORTHOPEDICS ADULT REFERRAL    M25.562    8. Bee allergy status Z91.030 EPINEPHrine (EPIPEN/ADRENACLICK/OR ANY BX GENERIC EQUIV) 0.3 MG/0.3ML injection 2-pack       RECOMMENDATIONS:       Type 2 diabetes - Hold Metformin day of surgery  Hypertension - Monitor blood pressure gale- and post-operatively          APPROVAL GIVEN to proceed with proposed procedure, without further diagnostic evaluation       Signed Electronically by: Morelia Ayon NP    Copy of this evaluation report is provided to requesting physician.    [facility] Preop Guidelines    Revised Cardiac Risk Index

## 2018-05-10 NOTE — PROGRESS NOTES
"  [address] [address]  [phone]  Dept: [phone]    PRE-OP EVALUATION:  Today's date: 5/10/2018    Hafsa Corona (: 1955) presents for pre-operative evaluation assessment as requested by Dr. Massey.  She requires evaluation and anesthesia risk assessment prior to undergoing surgery/procedure for treatment of cataract.    Fax number for surgical facility: ***  Primary Physician: Morelia Ayon  Type of Anesthesia Anticipated: {ANESTHESIA:358251}    Patient has a Health Care Directive or Living Will:  {YES/NO:151197::\"NO\"}    Preop Questions 5/10/2018   Who is doing your surgery? dr massey   What are you having done? cataract removal left eye   Date of Surgery/Procedure:    1.  Do you have a history of Heart attack, stroke, stent, coronary bypass surgery, or other heart surgery? No   2.  Do you ever have any pain or discomfort in your chest? YES - ***   3.  Do you have a history of  Heart Failure? No   4.   Are you troubled by shortness of breath when:  walking on a level surface, or up a slight hill, or at night? No   5.  Do you currently have a cold, bronchitis or other respiratory infection? No   6.  Do you have a cough, shortness of breath, or wheezing? No   7.  Do you sometimes get pains in the calves of your legs when you walk? YES - ***   8. Do you or anyone in your family have previous history of blood clots? YES - ***   9.  Do you or does anyone in your family have a serious bleeding problem such as prolonged bleeding following surgeries or cuts? No   10. Have you ever had problems with anemia or been told to take iron pills? YES - ***   11. Have you had any abnormal blood loss such as black, tarry or bloody stools, or abnormal vaginal bleeding? No   12. Have you ever had a blood transfusion? YES - ***   13. Have you or any of your relatives ever had problems with anesthesia? No   14. Do you have sleep apnea, excessive snoring or " daytime drowsiness? No   15. Do you have any prosthetic heart valves? No   16. Do you have prosthetic joints? No   17. Is there any chance that you may be pregnant? No         HPI:     HPI related to upcoming procedure: ***      {. 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) 09/15/2017     Priority: Medium     Pain in joint, multiple sites 09/15/2017     Priority: Medium     Ovarian cancer (H) 08/30/2017     Priority: Medium     Ovarian cancer, left (H) 08/01/2017     Priority: Medium     Encounter for long-term (current) use of medications 08/01/2017     Priority: Medium     S/P laparotomy 07/07/2017     Priority: Medium     Pelvic mass in female 07/03/2017     Priority: Medium     Long-term (current) use of anticoagulants [Z79.01] 06/06/2017     Priority: Medium     Deep vein thrombosis (DVT) (H) [I82.409] 06/06/2017     Priority: Medium     Major depressive disorder, recurrent episode, mild (H) 09/06/2016     Priority: Medium     Lumbago 04/04/2016     Priority: Medium     Essential hypertension 02/09/2016     Priority: Medium     BMI > 35 10/14/2015     Priority: Medium     Allergic rhinitis due to other allergen 12/20/2011     Priority: Medium     Type 2 diabetes mellitus without complication (H) 10/31/2010     Priority: Medium     HYPERLIPIDEMIA LDL GOAL <100 10/31/2010     Priority: Medium     Chronic Maxillary Sinusitis 11/21/2008     Priority: Medium     Tachycardia      Priority: Medium     Attention deficit disorder 07/19/2005     Priority: Medium     Problem list name updated by automated process. Provider to review       PANIC DISORDER  07/19/2005     Priority: Medium     VASOMOTOR RHINITIS  07/19/2005     Priority: Medium      Past Medical History:   Diagnosis Date     Anxiety state, unspecified     [id number]     Arthritis 2014    vague, gradual, not treated really, knees feel it     Attention deficit disorder without mention of hyperactivity      Cancer (H) 7/2017 and 1/2018    ovarian and kidney cancers, both treated well     Chronic rhinitis      Depressive disorder lifetime    plus Anxiety plus ADD. Escitalipram, therapy, ADD meds maybe     Depressive disorder, not elsewhere classified      Heart disease 1999    tachycardia and high cholesterol, managed     History of blood transfusion 7/2017    while in hospital for ovarian cancer     Hypertension 1999    tho BP was too LOW last week. past 12 years Generally OK     Panic disorder without agoraphobia      Pure hypercholesterolemia     Lipitor     Tachycardia, unspecified     9/1999-2/2004     Type II or unspecified type diabetes mellitus without mention of complication, not stated as uncontrolled     diagnosed 2004     Past Surgical History:   Procedure Laterality Date     BIOPSY  7/2017 and 2/2018    ovarian and kidney cancer biopsies. also 1987 breast benign     BREAST SURGERY  1987    date unsure. benign breast cyst removed     COLONOSCOPY  2008 and 2013    polyps removed. Next due fall 2018     HYSTERECTOMY TOTAL ABDOMINAL, BILATERAL SALPINGO-OOPHORECTOMY, NODE DISSECTION, COMBINED Left 7/7/2017    Procedure: COMBINED HYSTERECTOMY TOTAL ABDOMINAL, SALPINGO-OOPHORECTOMY, NODE DISSECTION;  Exploratory Laparotomy, Left Salpingo-Oophorectomy, Cancer Staging, Total Hysterectomy, Omentectomy, Evacuation of abdominal fluid, Lymph Node Dissection  Anesthesia Block ;  Surgeon: Serena Cole MD;  Location: [facility] OR     HYSTERECTOMY, PAP NO LONGER INDICATED  07/07/2017    Laparotomy; for ovarian cancer staging     INSERT PORT VASCULAR ACCESS Right 8/21/2017    Procedure: INSERT PORT VASCULAR ACCESS;  Single Lumen Chest Power Port;  Surgeon: Cee  Stephen Roberts PA-C;  Location: [facility] OR     LYMPHADENECTOMY RETROPERITONEAL Bilateral 07/07/2017    Laparotomy; pelvics & para-aortics; for ovarian cancer staging     OMENTECTOMY  07/07/2017    Laparotomy; for ovarian cancer staging     ORTHOPEDIC SURGERY  1/2002    broken left jaw due to fall, 4 fractures, titanium plates     SALPINGO OOPHORECTOMY,R/L/KAYY Right 2008    Salpingo Oophorectomy, RT     SALPINGO OOPHORECTOMY,R/L/KAYY Left 07/07/2017    Laparotomy; for ovarian cancer staging     SURGICAL HISTORY OF -       facial surgery d/t fall in 1/2002     SURGICAL HISTORY OF -       1985 removal of breast cyst     SURGICAL HISTORY OF -   2008    endometrial ablation     Current Outpatient Prescriptions   Medication Sig Dispense Refill     atorvastatin (LIPITOR) 80 MG tablet TAKE 1 TABLET(80 MG) BY MOUTH DAILY 90 tablet 0     B Complex Vitamins (VITAMIN B-COMPLEX PO)        blood glucose (NO BRAND SPECIFIED) lancets standard Use to test blood sugar 2 times daily or as directed. 100 each 11     blood glucose monitoring (NO BRAND SPECIFIED) meter device kit Use to test blood sugar 2 times daily or as directed. 1 kit 0     blood glucose monitoring (NO BRAND SPECIFIED) test strip Use to test blood sugars 2 times daily or as directed 100 strip PRN     CALCIUM-MAGNESIUM-VITAMIN D PO Take 2 tablets by mouth daily       cephALEXin (KEFLEX) 500 MG capsule Take 1 capsule (500 mg) by mouth 3 times daily 21 capsule 0     Cholecalciferol (VITAMIN D) 1000 UNIT capsule Take 2,000 Units by mouth daily        EPINEPHrine (EPIPEN) 0.3 MG/0.3ML injection Inject 0.3 mLs (0.3 mg) into the muscle once as needed for anaphylaxis 1 each PRN     escitalopram (LEXAPRO) 20 MG tablet Take 1 tablet (20 mg) by mouth daily 30 tablet PRN     fluticasone (FLONASE) 50 MCG/ACT spray SHAKE WELL AND USE 2 SPRAYS IN EACH NOSTRIL DAILY 48 mL PRN     lisinopril (PRINIVIL/ZESTRIL) 10 MG tablet Take 1 tablet (10 mg) by mouth daily 90 tablet PRN     loratadine  "(CLARITIN) 10 MG tablet Take 1 tablet (10 mg) by mouth daily 30 tablet 1     LORazepam (ATIVAN) 1 MG tablet Take 1 tablet (1 mg) by mouth every 6 hours as needed (nausea/vomiting, anxiety or sleep) 30 tablet 1     metFORMIN (GLUCOPHAGE-XR) 500 MG 24 hr tablet TAKE 2 TABLETS BY MOUTH ONCE DAILY WITH EVENING MEAL 180 tablet 0     Multiple Vitamins-Minerals (MULTIVITAMIN ADULT PO) Take 1 tablet by mouth daily       order for DME Equipment being ordered: blood pressure monitor 1 Units 0     order for DME Compression stockings 20-30 mm Hg. To be wear when directed by Hematology team and while awake for the first two years post clot. 1 each 0     order for DME Plain packing strip 1/4\" 1 Bottle PRN     order for DME Sterile zoey tipped applicators 1 Box PRN     order for DME USE 1/4 INCH  WIDTH PLAIN NU GAUZE PACKING TO DO SELF WOUND CARE OF LEFT LOWER LEG ONCE DAILY.  DIMENSIONS OF WOUND PRESENTLY ARE 2 X 2 INCH AND APPROXIMATELY 1/2 INCH DEEP.  USES 6 INCHES OF PACKING CURRENTLY FOR DRESSING CHANGE.    FAX TO [employer] -[phone] 3 Bottle 2     order for DME STERILE ADHESIVE PAD BANDAGE AT LEAST 4X4 INCH IN SIZE NEEDED FOR DAILY WOUND CARE TO LEFT LOWER LEG.  FAX TO [employer] -[phone] 30 pad 1     order for DME Home blood pressure monitor 1 Units 0     Probiotic Product (PRO-BIOTIC BLEND PO) Take 1 capsule by mouth daily Enzymatic Therapy-probiotic pearls       rivaroxaban ANTICOAGULANT (XARELTO) 20 MG TABS tablet Take 1 tablet (20 mg) by mouth daily (with dinner) 30 tablet 3     traZODone (DESYREL) 50 MG tablet TAKE 1 TO 2 TABLETS(50  MG) BY MOUTH EVERY NIGHT AS NEEDED FOR SLEEP 180 tablet 1     OTC products: {OTC ANALGESICS:095808}    Allergies   Allergen Reactions     Wasps [Hornets] Anaphylaxis     No Clinical Screening - See Comments      Taxol      Paclitaxel Difficulty breathing     Sulfa Drugs Hives      Latex Allergy: {YES/NO WITH DEFAULT:790610::\"NO\"}    Social History   Substance Use " "Topics     Smoking status: Never Smoker     Smokeless tobacco: Never Used     Alcohol use Yes      Comment: Very infrequent, a bit of wine or beer 2x per month maybe     History   Drug Use     Yes     Special: Marijuana     Comment: infrequent, for celebrations only, maybe 8x per year       REVIEW OF SYSTEMS:   {ROS Preop Choices:263109}    EXAM:   LMP 01/01/2009  {EXAM Preop Choices:427606}    DIAGNOSTICS:   {DIAGNOSTIC FOR PREOP:906636}    Recent Labs   Lab Test  03/22/18   1311  11/14/17   0949  10/16/17   0824   08/31/17   0749   08/21/17   0810   07/02/17   2255   04/11/17   1125   HGB   --   9.4*  9.7*   < >   --    < >   --    < >  8.6*   < >   --    PLT   --   229  313   < >   --    < >   --    < >  296   < >   --    INR   --    --    --    --    --    --   1.05   --   1.17*   < >   --    NA  138  143  138   < >   --    < >   --    < >  141   < >  141   POTASSIUM  4.1  4.1  4.1   < >   --    < >   --    < >  4.1   < >  4.1   CR  1.15*  0.71  0.67   < >   --    < >   --    < >  0.84   < >  0.75   A1C   --    --    --    --   6.1*   --    --    --    --    --   6.0    < > = values in this interval not displayed.        IMPRESSION:   {PREOP REASONS:259967::\"Reason for surgery/procedure: ***\",\"Diagnosis/reason for consult: ***\"}    The proposed surgical procedure is considered {HIGH=major cardiovascular or procedures requiring prolonged anesthesia >4 hours or large fluid shifts;    INTERMEDIATE=abdominal, most orthopedic and intrathoracic surgery; LOW= endoscopy, cataract and breast surgery:633154} risk.    REVISED CARDIAC RISK INDEX  The patient has the following serious cardiovascular risks for perioperative complications such as (MI, PE, VFib and 3  AV Block):  {PREOP REVISED CARDIAC INDEX (RCI):117518:p:\"No serious cardiac risks\"}  INTERPRETATION: {REVISED CARDIAC RISK INTERPRETATION:834168}    The patient has the following additional risks for perioperative complications:  {Additional perioperative " "risks:006480:p:\"No identified additional risks\"}      ICD-10-CM    1. Preop general physical exam Z01.818        RECOMMENDATIONS:     {IMPORTANT - Conditions - complete carefully!!:785078}    {IMPORTANT - Medications:291232::\"--Patient is to take all scheduled medications on the day of surgery EXCEPT for modifications listed below.\"}    {IMPORTANT - Approval:114290:p:\"APPROVAL GIVEN to proceed with proposed procedure, without further diagnostic evaluation\"}       Signed Electronically by: Morelia Ayon NP    Copy of this evaluation report is provided to requesting physician.    [facility] Preop Guidelines    Revised Cardiac Risk Index  "

## 2018-05-10 NOTE — MR AVS SNAPSHOT
After Visit Summary   5/10/2018    Hafsa Corona    MRN: [id number]           Patient Information     Date Of Birth          1955        Visit Information        Provider Department      5/10/2018 2:45 PM Morelia Ayon NP [facility]        Today's Diagnoses     Preop general physical exam    -  1    Cataract, unspecified cataract type, unspecified laterality        Bee allergy status        Renal cell carcinoma, right (H)        Type 2 diabetes mellitus without complication, without long-term current use of insulin (H)        Essential hypertension        Hyperlipidemia LDL goal <100        Pain in both knees, unspecified chronicity          Care Instructions    Before Your Surgery      Call your surgeon if there is any change in your health. This includes signs of a cold or flu (such as a sore throat, runny nose, cough, rash or fever).    Do not smoke, drink alcohol or take over the counter medicine (unless your surgeon or primary care doctor tells you to) for the 24 hours before and after surgery.    If you take prescribed drugs: Follow your doctor s orders about which medicines to take and which to stop until after surgery.    Eating and drinking prior to surgery: follow the instructions from your surgeon    Take a shower or bath the night before surgery. Use the soap your surgeon gave you to gently clean your skin. If you do not have soap from your surgeon, use your regular soap. Do not shave or scrub the surgery site.  Wear clean pajamas and have clean sheets on your bed.     Before Your Surgery      Call your surgeon if there is any change in your health. This includes signs of a cold or flu (such as a sore throat, runny nose, cough, rash or fever).    Do not smoke, drink alcohol or take over the counter medicine (unless your surgeon or primary care doctor tells you to) for the 24 hours before and after surgery.    If you take prescribed drugs: Follow your doctor s  orders about which medicines to take and which to stop until after surgery.    Eating and drinking prior to surgery: follow the instructions from your surgeon    Take a shower or bath the night before surgery. Use the soap your surgeon gave you to gently clean your skin. If you do not have soap from your surgeon, use your regular soap. Do not shave or scrub the surgery site.  Wear clean pajamas and have clean sheets on your bed.     Before Your Surgery      Call your surgeon if there is any change in your health. This includes signs of a cold or flu (such as a sore throat, runny nose, cough, rash or fever).    Do not smoke, drink alcohol or take over the counter medicine (unless your surgeon or primary care doctor tells you to) for the 24 hours before and after surgery.    If you take prescribed drugs: Follow your doctor s orders about which medicines to take and which to stop until after surgery.    Eating and drinking prior to surgery: follow the instructions from your surgeon    Take a shower or bath the night before surgery. Use the soap your surgeon gave you to gently clean your skin. If you do not have soap from your surgeon, use your regular soap. Do not shave or scrub the surgery site.  Wear clean pajamas and have clean sheets on your bed.           Follow-ups after your visit        Additional Services     ORTHOPEDICS ADULT REFERRAL       Your provider has referred you to: [facility] [phone]  Dr. Lebron [email or web address]    Please be aware that coverage of these services is subject to the terms and limitations of your health insurance plan.  Call member services at your health plan with any benefit or coverage questions.      Please bring the following to your appointment:    >>   Any x-rays, CTs or MRIs which have been performed.  Contact the facility where they were done to arrange for  prior to your scheduled appointment.    >>   List of current  medications   >>   This referral request   >>   Any documents/labs given to you for this referral                  Your next 10 appointments already scheduled     May 18, 2018   Procedure with Mary Jo Massey MD   [facility] ([facility])    [address] [place] [address]  5th Floor  [facility] [address]   [phone]           Located in the Clinics and Surgery Center at [address] [place] [address], [place] [address].   parking is very convenient and highly recommended.  is a $6 flat rate fee.  Both  and self parkers should enter the main arrival plaza from [facility]; parking attendants will direct you based on your parking preference.            May 18, 2018 11:30 AM CDT   (Arrive by 11:15 AM)   Post-Op with Mary Jo Massey MD   [facility] ([facility])    [address] [place] [address]  4th Floor  [facility] [address]   [phone]            May 22, 2018 11:15 AM CDT   [facility] Lab Draw with  First Class EV Conversions LAB DRAW   [facility] ([facility])    [address] [place] [address]  [address]  [facility] [address]   [phone]            May 25, 2018  2:00 PM CDT   (Arrive by 1:45 PM)   Return Visit with MARLON Og[facility] ([facility])    [address] [place] [address]  [address]  [facility] [address]   [phone]            Jun 13, 2018 10:30 AM CDT   Post-Op with Mary Jo Massey MD   Eye Clinic ([facility])    [address]  [facility] [address]   [phone]            Sep 21, 2018 12:00 PM CDT   (Arrive by 11:45 AM)   Return Visit with MARLON Chiu [facility] ([facility])    [address] [place] [address]  [address]  [facility] [address]   [phone]            Sep 21, 2018 12:30 PM CDT   MA JASON  "BILATERAL W/ THAIS with [id number]   [facility] ([facility])    [address]  2nd Floor  [facility] [address] [phone]           Three-dimensional (3D) mammograms are available at [facility] locations in [facility], [place], [place], [facility], [place], [place], and [place]. [facility] locations include [place] and Clinic & Surgery Center in [place]. Benefits of 3D mammograms include: - Improved rate of cancer detection - Decreases your chance of having to go back for more tests, which means fewer: - \"False-positive\" results (This means that there is an abnormal area but it isn't cancer.) - Invasive testing procedures, such as a biopsy or surgery - Can provide clearer images of the breast if you have dense breast tissue. 3D mammography is an optional exam that anyone can have with a 2D mammogram. It doesn't replace or take the place of a 2D mammogram. 2D mammograms remain an effective screening test for all women.  Not all insurance companies cover the cost of a 3D mammogram. Check with your insurance.              Who to contact     If you have questions or need follow up information about today's clinic visit or your schedule please contact [facility] directly at [phone].  Normal or non-critical lab and imaging results will be communicated to you by Soocialhart, letter or phone within 4 business days after the clinic has received the results. If you do not hear from us within 7 days, please contact the clinic through Soocialhart or phone. If you have a critical or abnormal lab result, we will notify you by phone as soon as possible.  Submit refill requests through Wercker or call your pharmacy and they will forward the refill request to us. Please allow 3 business days for your refill to be completed.          Additional Information About Your Visit        Wercker Information     Wercker gives you secure access to your " electronic health record. If you see a primary care provider, you can also send messages to your care team and make appointments. If you have questions, please call your primary care clinic.  If you do not have a primary care provider, please call [phone] and they will assist you.        Care EveryWhere ID     This is your Care EveryWhere ID. This could be used by other organizations to access your [facility] medical records  [id number]        Your Vitals Were     Pulse Temperature Respirations Last Period Pulse Oximetry BMI (Body Mass Index)    91 98.4  F (36.9  C) (Oral) 18 01/01/2009 100% 34.37 kg/m2       Blood Pressure from Last 3 Encounters:   05/10/18 111/74   03/16/18 104/70   03/12/18 92/70    Weight from Last 3 Encounters:   05/10/18 194 lb (88 kg)   03/16/18 189 lb 12.8 oz (86.1 kg)   02/26/18 184 lb 12.8 oz (83.8 kg)              We Performed the Following     Comprehensive metabolic panel     Hemoglobin A1c     Hemoglobin     ORTHOPEDICS ADULT REFERRAL          Today's Medication Changes          These changes are accurate as of 5/10/18  3:47 PM.  If you have any questions, ask your nurse or doctor.               These medicines have changed or have updated prescriptions.        Dose/Directions    atorvastatin 80 MG tablet   Commonly known as:  LIPITOR   This may have changed:  See the new instructions.   Used for:  Hyperlipidemia LDL goal <100   Changed by:  Morelia Ayon NP        TAKE 1 TABLET(80 MG) BY MOUTH DAILY   Quantity:  90 tablet   Refills:  PRN       metFORMIN 500 MG 24 hr tablet   Commonly known as:  GLUCOPHAGE-XR   This may have changed:  See the new instructions.   Used for:  Type 2 diabetes mellitus without complication, without long-term current use of insulin (H)   Changed by:  Morelia Ayon NP        TAKE 2 TABLETS BY MOUTH ONCE DAILY WITH EVENING MEAL   Quantity:  180 tablet   Refills:  PRN            Where to get your medicines      These medications were sent to  [id number] - [place], [place] - [address] AT [facility] [address]  [address] [place] [address]    Hours:  24-hours Phone:  [phone]     atorvastatin 80 MG tablet    EPINEPHrine 0.3 MG/0.3ML injection 2-pack    lisinopril 10 MG tablet    metFORMIN 500 MG 24 hr tablet                Primary Care Provider Office Phone # Fax #    Morelia JAVIER Ayon [phone] [address] [place]  [facility] [address]        Equal Access to Services     BHUPINDER [facility] Hospital[facility] AH: Hadii aad ku hadasho Soomaali, waaxda luqadaha, qaybta kaalmada adeegyada, waxay idiin hayaan adeeg khnancy betts . So [facility] [phone].    ATENCIÓN: Si habla español, tiene a martínez disposición servicios gratuitos de asistencia lingüística. [facility] [phone].    We comply with applicable federal civil rights laws and [place] laws. We do not discriminate on the basis of race, color, national origin, age, disability, sex, sexual orientation, or gender identity.            Thank you!     Thank you for choosing [facility]  for your care. Our goal is always to provide you with excellent care. Hearing back from our patients is one way we can continue to improve our services. Please take a few minutes to complete the written survey that you may receive in the mail after your visit with us. Thank you!             Your Updated Medication List - Protect others around you: Learn how to safely use, store and throw away your medicines at www.disposemymeds.org.          This list is accurate as of 5/10/18  3:47 PM.  Always use your most recent med list.                   Brand Name Dispense Instructions for use Diagnosis    atorvastatin 80 MG tablet    LIPITOR    90 tablet    TAKE 1 TABLET(80 MG) BY MOUTH DAILY    Hyperlipidemia LDL goal <100       blood glucose lancets standard    no brand specified    100 each    Use to test blood sugar 2 times daily or as directed.    Type 2 diabetes  mellitus without complication, without long-term current use of insulin (H)       blood glucose monitoring meter device kit    no brand specified    1 kit    Use to test blood sugar 2 times daily or as directed.    Type 2 diabetes mellitus without complication, without long-term current use of insulin (H)       blood glucose monitoring test strip    no brand specified    100 strip    Use to test blood sugars 2 times daily or as directed    Type 2 diabetes mellitus without complication, without long-term current use of insulin (H)       CALCIUM-MAGNESIUM-VITAMIN D PO      Take 2 tablets by mouth daily        cephALEXin 500 MG capsule    KEFLEX    21 capsule    Take 1 capsule (500 mg) by mouth 3 times daily    Infected sebaceous cyst of skin       EPINEPHrine 0.3 MG/0.3ML injection 2-pack    EPIPEN/ADRENACLICK/or ANY BX GENERIC EQUIV    0.3 mL    Inject 0.3 mLs (0.3 mg) into the muscle once as needed for anaphylaxis    Bee allergy status       escitalopram 20 MG tablet    LEXAPRO    30 tablet    Take 1 tablet (20 mg) by mouth daily    Major depressive disorder, recurrent episode, mild (H)       fluticasone 50 MCG/ACT spray    FLONASE    48 mL    SHAKE WELL AND USE 2 SPRAYS IN EACH NOSTRIL DAILY    Chronic maxillary sinusitis       lisinopril 10 MG tablet    PRINIVIL/ZESTRIL    90 tablet    Take 1 tablet (10 mg) by mouth daily    Essential hypertension       loratadine 10 MG tablet    CLARITIN    30 tablet    Take 1 tablet (10 mg) by mouth daily    Pain in joint, multiple sites       LORazepam 1 MG tablet    ATIVAN    30 tablet    Take 1 tablet (1 mg) by mouth every 6 hours as needed (nausea/vomiting, anxiety or sleep)    Ovarian cancer, left (H), Encounter for long-term (current) use of medications       MAGNESIA PO      Take 500 mg by mouth        metFORMIN 500 MG 24 hr tablet    GLUCOPHAGE-XR    180 tablet    TAKE 2 TABLETS BY MOUTH ONCE DAILY WITH EVENING MEAL    Type 2 diabetes mellitus without complication,  "without long-term current use of insulin (H)       MULTIVITAMIN ADULT PO      Take 1 tablet by mouth daily        order for DME     1 Units    Equipment being ordered: blood pressure monitor    Essential hypertension       * order for DME     1 each    Compression stockings 20-30 mm Hg. To be wear when directed by Hematology team and while awake for the first two years post clot.    Long-term (current) use of anticoagulants, Acute deep vein thrombosis (DVT) of left lower extremity, unspecified vein (H)       * order for DME     1 Bottle    Plain packing strip 1/4\"    Abscess of leg       * order for DME     1 Box    Sterile zoey tipped applicators    Abscess of leg       * order for DME     3 Bottle    USE 1/4 INCH  WIDTH PLAIN NU GAUZE PACKING TO DO SELF WOUND CARE OF LEFT LOWER LEG ONCE DAILY. DIMENSIONS OF WOUND PRESENTLY ARE 2 X 2 INCH AND APPROXIMATELY 1/2 INCH DEEP.  USES 6 INCHES OF PACKING CURRENTLY FOR DRESSING CHANGE.  FAX TO [facility] [phone]    Wound of left leg       * order for DME     30 pad    STERILE ADHESIVE PAD BANDAGE AT LEAST 4X4 INCH IN SIZE NEEDED FOR DAILY WOUND CARE TO LEFT LOWER LEG. FAX TO [facility] [phone]    Leg wound, left       * order for DME     1 Units    Home blood pressure monitor    Type 2 diabetes mellitus without complication, without long-term current use of insulin (H)       PRO-BIOTIC BLEND PO      Take 1 capsule by mouth daily Enzymatic Therapy-probiotic pearls        rivaroxaban ANTICOAGULANT 20 MG Tabs tablet    XARELTO    30 tablet    Take 1 tablet (20 mg) by mouth daily (with dinner)    Long-term (current) use of anticoagulants, Acute deep vein thrombosis (DVT) of left lower extremity, unspecified vein (H)       traZODone 50 MG tablet    DESYREL    180 tablet    TAKE 1 TO 2 TABLETS(50  MG) BY MOUTH EVERY NIGHT AS NEEDED FOR SLEEP    Insomnia, unspecified type       VITAMIN B-COMPLEX PO           vitamin D 1000 units capsule      Take 2,000 " Units by mouth daily        * Notice:  This list has 6 medication(s) that are the same as other medications prescribed for you. Read the directions carefully, and ask your doctor or other care provider to review them with you.

## 2018-05-11 LAB
ALBUMIN SERPL-MCNC: 3.8 G/DL (ref 3.4–5)
ALP SERPL-CCNC: 82 U/L (ref 40–150)
ALT SERPL W P-5'-P-CCNC: 21 U/L (ref 0–50)
ANION GAP SERPL CALCULATED.3IONS-SCNC: 7 MMOL/L (ref 3–14)
AST SERPL W P-5'-P-CCNC: 18 U/L (ref 0–45)
BILIRUB SERPL-MCNC: 0.2 MG/DL (ref 0.2–1.3)
BUN SERPL-MCNC: 28 MG/DL (ref 7–30)
CALCIUM SERPL-MCNC: 9 MG/DL (ref 8.5–10.1)
CHLORIDE SERPL-SCNC: 110 MMOL/L (ref 94–109)
CO2 SERPL-SCNC: 25 MMOL/L (ref 20–32)
CREAT SERPL-MCNC: 1.16 MG/DL (ref 0.52–1.04)
GFR SERPL CREATININE-BSD FRML MDRD: 47 ML/MIN/1.7M2
GLUCOSE SERPL-MCNC: 105 MG/DL (ref 70–99)
POTASSIUM SERPL-SCNC: 4.4 MMOL/L (ref 3.4–5.3)
PROT SERPL-MCNC: 7.1 G/DL (ref 6.8–8.8)
SODIUM SERPL-SCNC: 142 MMOL/L (ref 133–144)

## 2018-05-17 ENCOUNTER — TELEPHONE (OUTPATIENT)
Dept: OPHTHALMOLOGY | Facility: CLINIC | Age: 63
End: 2018-05-17

## 2018-05-17 ENCOUNTER — ANESTHESIA EVENT (OUTPATIENT)
Dept: SURGERY | Facility: AMBULATORY SURGERY CENTER | Age: 63
End: 2018-05-17

## 2018-05-17 DIAGNOSIS — H25.12 NUCLEAR SCLEROTIC CATARACT, LEFT: Primary | ICD-10-CM

## 2018-05-17 RX ORDER — LIDOCAINE HYDROCHLORIDE 20 MG/ML
5 INJECTION, SOLUTION EPIDURAL; INFILTRATION; INTRACAUDAL; PERINEURAL ONCE
Status: DISCONTINUED | OUTPATIENT
Start: 2018-05-17 | End: 2018-05-19 | Stop reason: HOSPADM

## 2018-05-17 RX ORDER — OFLOXACIN 3 MG/ML
SOLUTION/ DROPS OPHTHALMIC
Qty: 1 BOTTLE | Refills: 0 | Status: SHIPPED | OUTPATIENT
Start: 2018-05-17 | End: 2018-08-24

## 2018-05-17 RX ORDER — PREDNISOLONE ACETATE 10 MG/ML
SUSPENSION/ DROPS OPHTHALMIC
Qty: 1 BOTTLE | Refills: 1 | Status: SHIPPED | OUTPATIENT
Start: 2018-05-17 | End: 2018-06-05

## 2018-05-17 NOTE — TELEPHONE ENCOUNTER
Pt having cataract surgery tomorrow. Will go to PAC after surgery and then to clinic for post-op exam    Pt states was told needs family/friend with pt between PAC and post-op visit  Pt only had family/friend available for ride home after post-op visit    Pt has arrangements now for family/friend to be available.  Reviewed not sure of exact protocols, but able to follow protocols would be best.    May review with dr. prado in AM typical routine for pt's and protocols for that gap    Pt seemed satisfied with information  Trevin Null RN 5:46 PM 05/17/18

## 2018-05-17 NOTE — TELEPHONE ENCOUNTER
[facility] Call Center    Phone Message    May a detailed message be left on voicemail: yes    Reason for Call: Other: Pt stated she is concerned about the gap in time between surgery and post-o appt. Please call pt back to discuss, pt requested Jamari Carvalho.     Action Taken: Message routed to:  [facility] ([facility]): Ophthamology

## 2018-05-18 ENCOUNTER — ANESTHESIA (OUTPATIENT)
Dept: SURGERY | Facility: AMBULATORY SURGERY CENTER | Age: 63
End: 2018-05-18

## 2018-05-18 ENCOUNTER — HOSPITAL ENCOUNTER (OUTPATIENT)
Facility: AMBULATORY SURGERY CENTER | Age: 63
End: 2018-05-18
Attending: OPHTHALMOLOGY
Payer: COMMERCIAL

## 2018-05-18 ENCOUNTER — SURGERY (OUTPATIENT)
Age: 63
End: 2018-05-18

## 2018-05-18 ENCOUNTER — OFFICE VISIT (OUTPATIENT)
Dept: OPHTHALMOLOGY | Facility: CLINIC | Age: 63
End: 2018-05-18
Payer: COMMERCIAL

## 2018-05-18 VITALS
HEIGHT: 63 IN | BODY MASS INDEX: 33.66 KG/M2 | HEART RATE: 87 BPM | RESPIRATION RATE: 16 BRPM | DIASTOLIC BLOOD PRESSURE: 66 MMHG | WEIGHT: 190 LBS | SYSTOLIC BLOOD PRESSURE: 113 MMHG | OXYGEN SATURATION: 97 % | TEMPERATURE: 97.4 F

## 2018-05-18 DIAGNOSIS — H25.12 NUCLEAR SCLEROTIC CATARACT, LEFT: Primary | ICD-10-CM

## 2018-05-18 DIAGNOSIS — Z96.1 PSEUDOPHAKIA, LEFT EYE: Primary | ICD-10-CM

## 2018-05-18 LAB
GLUCOSE BLDC GLUCOMTR-MCNC: 107 MG/DL (ref 70–99)
GLUCOSE BLDC GLUCOMTR-MCNC: 107 MG/DL (ref 70–99)

## 2018-05-18 DEVICE — IMPLANTABLE DEVICE: Type: IMPLANTABLE DEVICE | Site: EYE | Status: FUNCTIONAL

## 2018-05-18 RX ORDER — CYCLOPENTOLATE HYDROCHLORIDE 10 MG/ML
1 SOLUTION/ DROPS OPHTHALMIC
Status: COMPLETED | OUTPATIENT
Start: 2018-05-18 | End: 2018-05-18

## 2018-05-18 RX ORDER — PROPOFOL 10 MG/ML
INJECTION, EMULSION INTRAVENOUS PRN
Status: DISCONTINUED | OUTPATIENT
Start: 2018-05-18 | End: 2018-05-18

## 2018-05-18 RX ORDER — PROPARACAINE HYDROCHLORIDE 5 MG/ML
1 SOLUTION/ DROPS OPHTHALMIC ONCE
Status: COMPLETED | OUTPATIENT
Start: 2018-05-18 | End: 2018-05-18

## 2018-05-18 RX ORDER — LIDOCAINE 40 MG/G
CREAM TOPICAL
Status: DISCONTINUED | OUTPATIENT
Start: 2018-05-18 | End: 2018-05-19 | Stop reason: HOSPADM

## 2018-05-18 RX ORDER — FENTANYL CITRATE 50 UG/ML
INJECTION, SOLUTION INTRAMUSCULAR; INTRAVENOUS PRN
Status: DISCONTINUED | OUTPATIENT
Start: 2018-05-18 | End: 2018-05-18

## 2018-05-18 RX ORDER — PHENYLEPHRINE HYDROCHLORIDE 25 MG/ML
1 SOLUTION/ DROPS OPHTHALMIC
Status: COMPLETED | OUTPATIENT
Start: 2018-05-18 | End: 2018-05-18

## 2018-05-18 RX ORDER — ONDANSETRON 2 MG/ML
4 INJECTION INTRAMUSCULAR; INTRAVENOUS EVERY 30 MIN PRN
Status: DISCONTINUED | OUTPATIENT
Start: 2018-05-18 | End: 2018-05-19 | Stop reason: HOSPADM

## 2018-05-18 RX ORDER — ONDANSETRON 2 MG/ML
INJECTION INTRAMUSCULAR; INTRAVENOUS PRN
Status: DISCONTINUED | OUTPATIENT
Start: 2018-05-18 | End: 2018-05-18

## 2018-05-18 RX ORDER — BALANCED SALT SOLUTION 6.4; .75; .48; .3; 3.9; 1.7 MG/ML; MG/ML; MG/ML; MG/ML; MG/ML; MG/ML
SOLUTION OPHTHALMIC PRN
Status: DISCONTINUED | OUTPATIENT
Start: 2018-05-18 | End: 2018-05-18 | Stop reason: HOSPADM

## 2018-05-18 RX ORDER — NALOXONE HYDROCHLORIDE 0.4 MG/ML
.1-.4 INJECTION, SOLUTION INTRAMUSCULAR; INTRAVENOUS; SUBCUTANEOUS
Status: DISCONTINUED | OUTPATIENT
Start: 2018-05-18 | End: 2018-05-19 | Stop reason: HOSPADM

## 2018-05-18 RX ORDER — TETRACAINE HYDROCHLORIDE 5 MG/ML
SOLUTION OPHTHALMIC PRN
Status: DISCONTINUED | OUTPATIENT
Start: 2018-05-18 | End: 2018-05-18 | Stop reason: HOSPADM

## 2018-05-18 RX ORDER — HEPARIN SODIUM (PORCINE) LOCK FLUSH IV SOLN 100 UNIT/ML 100 UNIT/ML
5 SOLUTION INTRAVENOUS EVERY 8 HOURS
Status: DISCONTINUED | OUTPATIENT
Start: 2018-05-18 | End: 2018-05-19 | Stop reason: HOSPADM

## 2018-05-18 RX ORDER — ONDANSETRON 4 MG/1
4 TABLET, ORALLY DISINTEGRATING ORAL EVERY 30 MIN PRN
Status: DISCONTINUED | OUTPATIENT
Start: 2018-05-18 | End: 2018-05-19 | Stop reason: HOSPADM

## 2018-05-18 RX ORDER — FLURBIPROFEN SODIUM 0.3 MG/ML
1 SOLUTION/ DROPS OPHTHALMIC
Status: DISCONTINUED | OUTPATIENT
Start: 2018-05-18 | End: 2018-05-19 | Stop reason: HOSPADM

## 2018-05-18 RX ORDER — SODIUM CHLORIDE, SODIUM LACTATE, POTASSIUM CHLORIDE, CALCIUM CHLORIDE 600; 310; 30; 20 MG/100ML; MG/100ML; MG/100ML; MG/100ML
INJECTION, SOLUTION INTRAVENOUS CONTINUOUS
Status: DISCONTINUED | OUTPATIENT
Start: 2018-05-18 | End: 2018-05-19 | Stop reason: HOSPADM

## 2018-05-18 RX ORDER — DEXAMETHASONE SODIUM PHOSPHATE 4 MG/ML
INJECTION, SOLUTION INTRA-ARTICULAR; INTRALESIONAL; INTRAMUSCULAR; INTRAVENOUS; SOFT TISSUE PRN
Status: DISCONTINUED | OUTPATIENT
Start: 2018-05-18 | End: 2018-05-18

## 2018-05-18 RX ORDER — OFLOXACIN 3 MG/ML
1 SOLUTION/ DROPS OPHTHALMIC
Status: COMPLETED | OUTPATIENT
Start: 2018-05-18 | End: 2018-05-18

## 2018-05-18 RX ORDER — MEPERIDINE HYDROCHLORIDE 25 MG/ML
12.5 INJECTION INTRAMUSCULAR; INTRAVENOUS; SUBCUTANEOUS
Status: DISCONTINUED | OUTPATIENT
Start: 2018-05-18 | End: 2018-05-19 | Stop reason: HOSPADM

## 2018-05-18 RX ORDER — LIDOCAINE HYDROCHLORIDE 20 MG/ML
INJECTION, SOLUTION INFILTRATION; PERINEURAL PRN
Status: DISCONTINUED | OUTPATIENT
Start: 2018-05-18 | End: 2018-05-18 | Stop reason: HOSPADM

## 2018-05-18 RX ORDER — ACETAMINOPHEN 325 MG/1
975 TABLET ORAL ONCE
Status: DISCONTINUED | OUTPATIENT
Start: 2018-05-18 | End: 2018-05-19 | Stop reason: HOSPADM

## 2018-05-18 RX ADMIN — OFLOXACIN 1 DROP: 3 SOLUTION/ DROPS OPHTHALMIC at 06:42

## 2018-05-18 RX ADMIN — BALANCED SALT SOLUTION 15 ML: 6.4; .75; .48; .3; 3.9; 1.7 SOLUTION OPHTHALMIC at 07:40

## 2018-05-18 RX ADMIN — DEXAMETHASONE SODIUM PHOSPHATE 4 MG: 4 INJECTION, SOLUTION INTRA-ARTICULAR; INTRALESIONAL; INTRAMUSCULAR; INTRAVENOUS; SOFT TISSUE at 07:26

## 2018-05-18 RX ADMIN — OFLOXACIN 1 DROP: 3 SOLUTION/ DROPS OPHTHALMIC at 07:04

## 2018-05-18 RX ADMIN — FENTANYL CITRATE 50 MCG: 50 INJECTION, SOLUTION INTRAMUSCULAR; INTRAVENOUS at 07:26

## 2018-05-18 RX ADMIN — CYCLOPENTOLATE HYDROCHLORIDE 1 DROP: 10 SOLUTION/ DROPS OPHTHALMIC at 06:42

## 2018-05-18 RX ADMIN — FLURBIPROFEN SODIUM 1 DROP: 0.3 SOLUTION/ DROPS OPHTHALMIC at 06:42

## 2018-05-18 RX ADMIN — LIDOCAINE HYDROCHLORIDE 4.5 ML: 20 INJECTION, SOLUTION INFILTRATION; PERINEURAL at 07:27

## 2018-05-18 RX ADMIN — PROPARACAINE HYDROCHLORIDE 1 DROP: 5 SOLUTION/ DROPS OPHTHALMIC at 06:33

## 2018-05-18 RX ADMIN — SODIUM CHLORIDE, SODIUM LACTATE, POTASSIUM CHLORIDE, CALCIUM CHLORIDE: 600; 310; 30; 20 INJECTION, SOLUTION INTRAVENOUS at 07:23

## 2018-05-18 RX ADMIN — PHENYLEPHRINE HYDROCHLORIDE 1 DROP: 25 SOLUTION/ DROPS OPHTHALMIC at 06:48

## 2018-05-18 RX ADMIN — Medication 1000 ML: at 07:40

## 2018-05-18 RX ADMIN — HEPARIN SODIUM (PORCINE) LOCK FLUSH IV SOLN 100 UNIT/ML 5 ML: 100 SOLUTION at 09:00

## 2018-05-18 RX ADMIN — PROPOFOL 40 MG: 10 INJECTION, EMULSION INTRAVENOUS at 07:26

## 2018-05-18 RX ADMIN — PHENYLEPHRINE HYDROCHLORIDE 1 DROP: 25 SOLUTION/ DROPS OPHTHALMIC at 06:42

## 2018-05-18 RX ADMIN — CYCLOPENTOLATE HYDROCHLORIDE 1 DROP: 10 SOLUTION/ DROPS OPHTHALMIC at 06:59

## 2018-05-18 RX ADMIN — CYCLOPENTOLATE HYDROCHLORIDE 1 DROP: 10 SOLUTION/ DROPS OPHTHALMIC at 06:48

## 2018-05-18 RX ADMIN — TETRACAINE HYDROCHLORIDE 2 DROP: 5 SOLUTION OPHTHALMIC at 07:25

## 2018-05-18 RX ADMIN — ONDANSETRON 4 MG: 2 INJECTION INTRAMUSCULAR; INTRAVENOUS at 07:26

## 2018-05-18 RX ADMIN — FLURBIPROFEN SODIUM 1 DROP: 0.3 SOLUTION/ DROPS OPHTHALMIC at 06:59

## 2018-05-18 RX ADMIN — PHENYLEPHRINE HYDROCHLORIDE 1 DROP: 25 SOLUTION/ DROPS OPHTHALMIC at 06:59

## 2018-05-18 RX ADMIN — FLURBIPROFEN SODIUM 1 DROP: 0.3 SOLUTION/ DROPS OPHTHALMIC at 06:48

## 2018-05-18 RX ADMIN — OFLOXACIN 1 DROP: 3 SOLUTION/ DROPS OPHTHALMIC at 06:57

## 2018-05-18 ASSESSMENT — EXTERNAL EXAM - LEFT EYE: OS_EXAM: NORMAL

## 2018-05-18 ASSESSMENT — SLIT LAMP EXAM - LIDS: COMMENTS: NORMAL

## 2018-05-18 ASSESSMENT — VISUAL ACUITY
OD_SC: 20/20
OS_CC+: -3
METHOD: SNELLEN - LINEAR
OS_CC: 20/50
OD_SC+: -2

## 2018-05-18 ASSESSMENT — TONOMETRY
OS_IOP_MMHG: 22
IOP_METHOD: ICARE
OD_IOP_MMHG: 11

## 2018-05-18 NOTE — PROCEDURES
Ophthalmology Post-op Procedure Note    Surgical Service:    Ophthalmology & Visual Sciences  Date Performed:      May 18, 2018  Location: [facility]      Pre-operative Diagnosis: Visually significant cataract, left eye  Post-operative Diagnosis:  Pseudophakia, left eye  Operative Procedure:  Phacoemulsification with intraocular lens implantation, left eye    Surgeon(s):  Fellow/Staff Surgeon:       Mary Jo Massey MD  Resident Surgeon:            Alejandro Chinchilla MD    Anesthesia:   Retrobulbar/MAC  Findings:  No unusual findings   Blood Loss:    Minimal  Implants:  SN6AT5 26.5 intraocular lens  Specimens:  None     Complications:  The patient did not experience any complications.   Condition: Stable    Operative Report Completion:    Description of Operation/Procedure:  After appropriate informed consent was obtained, the appropriate cardiac and blood pressure monitors were placed. The patient was brought to the operating room. A final pause occurred just before the start of the procedure during which the entire procedure team actively confirmed the correct patient, procedure, site, special equipment and special requirements. Under monitored anesthesia care, the patient was sedated with intravenous anesthesia. While the patient was sedated, retrobulbar anesthesia was achieved with 4 cc of 1% lidocaine, 0.375% bupivacaine and 75 units of Vitrase. An additional 1 cc of this anesthetic mixture was given around the orbicularis oculi muscle as the needle was being withdrawn from the muscle cone. The patient was prepped and draped in the usual sterile fashion using 5% povidone/iodine.     An eyelid speculum was placed to open the eyelids. A paracentesis port was placed approximately sixty degrees to the left of the planned temporal incision location using the sideport blade. Approximately 0.8 cc of 1% nonpreserved lidocaine was placed into the anterior chamber. The anterior chamber was filled with dispersive  viscoelastic. A clear corneal temporal incision was made with a metal 2.6 mm keratome. A round continuous tear capsulorhexis was initiated with a cystotome and completed with the Utrata forceps. Balanced salt solution on a cannula was used to perform hydrodissection. The nucleus was removed using phacoemulsification with a chop technique. The remaining cortical material was removed using the irrigation/aspiration handpiece. The capsular bag was filled with cohesive viscoelastic. The 077 degree axis was marked on the cornea with the toric marker. A lens of the model and power listed above was placed into the capsular bag using the cartridge injection system and the toric lens axis aligned with the corneal markings.  The remaining viscoelastic was removed using the irrigation aspiration handpiece. The paracentesis and temporal wounds were hydrated with balanced salt solution. At the conclusion of the case, the wounds were felt to be watertight, the pupil was round, the lens was centered, and the anterior chamber was deep. The eyelid speculum was removed. Antibiotic ointment was administered to the operative eye. A pressure patch was placed over the operative eye.        Attending Attestation:  I was present for and performed the entire procedure.  Mary Jo Massey MD

## 2018-05-18 NOTE — NURSING NOTE
Chief Complaints and History of Present Illnesses   Patient presents with     Post Op (Ophthalmology) Left Eye     POD#0 s/p CE/IOL     HPI    Affected eye(s):  Left   Symptoms:     Blurred vision   Double vision      Frequency:  Constant       Do you have eye pain now?:  No      Comments:  Has her drops from pharmacy. Having some some double vision and floaters. Seeing better in left eye already. No eye pain.    Erin Encinas COT 11:44 AM May 18, 2018

## 2018-05-18 NOTE — ANESTHESIA CARE TRANSFER NOTE
Patient: Hafsa Corona    Procedure(s):  Left Eye Phacoemulsification with Toric Lens - Wound Class: I-Clean    Diagnosis: Left Eye Cataract  Diagnosis Additional Information: No value filed.    Anesthesia Type:   MAC     Note:  Airway :Room Air  Patient transferred to:Phase II  Comments: Patient awake and breathing spont. VSS. No complaints of pain or nausea. Report to RNHandoff Report: Identifed the Patient, Identified the Reponsible Provider, Reviewed the pertinent medical history, Discussed the surgical course, Reviewed Intra-OP anesthesia mangement and issues during anesthesia, Set expectations for post-procedure period and Allowed opportunity for questions and acknowledgement of understanding      Vitals: (Last set prior to Anesthesia Care Transfer)    CRNA VITALS  5/18/2018 0803 - 5/18/2018 0838      5/18/2018             Pulse: 82    Ht Rate: 82    SpO2: 100 %    Resp Rate (set): 10                Electronically Signed By: MARLON Bullock CRNA  May 18, 2018  8:38 AM

## 2018-05-18 NOTE — MR AVS SNAPSHOT
After Visit Summary   5/18/2018    Hafsa Corona    MRN: [id number]           Patient Information     Date Of Birth          1955        Visit Information        Provider Department      5/18/2018 11:30 AM Mary Jo Massey MD [facility] Ophthalmology        Today's Diagnoses     Pseudophakia, left eye    -  1       Follow-ups after your visit        Your next 10 appointments already scheduled     May 22, 2018 11:15 AM CDT   [facility]   [facility] Draw ([facility])    [address]  [address]  [facility] [address]   [phone]            May 25, 2018  2:00 PM CDT   (Arrive by 1:45 PM)   Return Visit with MARLON Ogden [facility] ([facility])    [address] [place] [address]  [address]  [facility] [address]   [phone]            Jun 13, 2018 10:30 AM CDT   Post-Op with Mary Jo Massey MD   Eye Clinic ([facility])    [address]  [facility] [address]   [phone]            Sep 21, 2018 12:00 PM CDT   (Arrive by 11:45 AM)   Return Visit with MARLON Chiu [facility] ([facility])    [address] [place] [address]  [address]  [facility] [address]   [phone]            Sep 21, 2018 12:30 PM CDT   MA SCREENING BILATERAL W/ THAIS with [email or web address]   [facility] ([facility])    [address] [place] [address]  2nd Floor  [facility] [address]   [phone]           Three-dimensional (3D) mammograms are available at [facility] locations in [facility], [place], [place], [facility], [place], [place], and [place]. [facility] locations include [place] and [facility] in [place]. Benefits of 3D mammograms include: - Improved rate of cancer detection - Decreases your chance of having  "to go back for more tests, which means fewer: - \"False-positive\" results (This means that there is an abnormal area but it isn't cancer.) - Invasive testing procedures, such as a biopsy or surgery - Can provide clearer images of the breast if you have dense breast tissue. 3D mammography is an optional exam that anyone can have with a 2D mammogram. It doesn't replace or take the place of a 2D mammogram. 2D mammograms remain an effective screening test for all women.  Not all insurance companies cover the cost of a 3D mammogram. Check with your insurance.              Who to contact     Please call your clinic at [phone] to:    Ask questions about your health    Make or cancel appointments    Discuss your medicines    Learn about your test results    Speak to your doctor            Additional Information About Your Visit        Zibby Information     Zibby gives you secure access to your electronic health record. If you see a primary care provider, you can also send messages to your care team and make appointments. If you have questions, please call your primary care clinic.  If you do not have a primary care provider, please call [phone] and they will assist you.      Zibby is an electronic gateway that provides easy, online access to your medical records. With Zibby, you can request a clinic appointment, read your test results, renew a prescription or communicate with your care team.     To access your existing account, please contact your [facility] or call [phone] for assistance.        Care EveryWhere ID     This is your Care EveryWhere ID. This could be used by other organizations to access your [place] medical records  [id number]        Your Vitals Were     Last Period                   01/01/2009            Blood Pressure from Last 3 Encounters:   05/18/18 113/66   05/10/18 111/74   03/16/18 104/70    Weight from Last 3 Encounters:   05/18/18 86.2 kg (190 " lb)   05/10/18 88 kg (194 lb)   03/16/18 86.1 kg (189 lb 12.8 oz)              Today, you had the following     No orders found for display       Primary Care Provider Office Phone # Fax #    [place] HERRERA ATTILA Ayon [phone] [address] [place]  [facility] [address]        Equal Access to Services     [facility] CenterJULIO : Hadii aad ku hadasho Soomaali, waaxda luqadaha, qaybta kaalmada adeegyada, waxay idiin hayaan adeeg kharash la'aan . So [facility] [phone].    ATENCIÓN: Si habla español, tiene a martíenz disposición servicios gratuitos de asistencia lingüística. Llame al [phone].    We comply with applicable federal civil rights laws and [place] laws. We do not discriminate on the basis of race, color, national origin, age, disability, sex, sexual orientation, or gender identity.            Thank you!     Thank you for choosing [facility] OPHTHALMOLOGY  for your care. Our goal is always to provide you with excellent care. Hearing back from our patients is one way we can continue to improve our services. Please take a few minutes to complete the written survey that you may receive in the mail after your visit with us. Thank you!             Your Updated Medication List - Protect others around you: Learn how to safely use, store and throw away your medicines at www.disposemymeds.org.          This list is accurate as of 5/18/18  1:23 PM.  Always use your most recent med list.                   Brand Name Dispense Instructions for use Diagnosis    atorvastatin 80 MG tablet    LIPITOR    90 tablet    TAKE 1 TABLET(80 MG) BY MOUTH DAILY    Hyperlipidemia LDL goal <100       blood glucose lancets standard    no brand specified    100 each    Use to test blood sugar 2 times daily or as directed.    Type 2 diabetes mellitus without complication, without long-term current use of insulin (H)       blood glucose monitoring meter device kit    no brand specified    1 kit    Use to test blood sugar 2 times daily or  as directed.    Type 2 diabetes mellitus without complication, without long-term current use of insulin (H)       blood glucose monitoring test strip    no brand specified    100 strip    Use to test blood sugars 2 times daily or as directed    Type 2 diabetes mellitus without complication, without long-term current use of insulin (H)       CALCIUM-MAGNESIUM-VITAMIN D PO      Take 2 tablets by mouth daily        EPINEPHrine 0.3 MG/0.3ML injection 2-pack    EPIPEN/ADRENACLICK/or ANY BX GENERIC EQUIV    0.3 mL    Inject 0.3 mLs (0.3 mg) into the muscle once as needed for anaphylaxis    Bee allergy status       escitalopram 20 MG tablet    LEXAPRO    30 tablet    Take 1 tablet (20 mg) by mouth daily    Major depressive disorder, recurrent episode, mild (H)       fluticasone 50 MCG/ACT spray    FLONASE    48 mL    SHAKE WELL AND USE 2 SPRAYS IN EACH NOSTRIL DAILY    Chronic maxillary sinusitis       lisinopril 10 MG tablet    PRINIVIL/ZESTRIL    90 tablet    Take 1 tablet (10 mg) by mouth daily    Essential hypertension       loratadine 10 MG tablet    CLARITIN    30 tablet    Take 1 tablet (10 mg) by mouth daily    Pain in joint, multiple sites       LORazepam 1 MG tablet    ATIVAN    30 tablet    Take 1 tablet (1 mg) by mouth every 6 hours as needed (nausea/vomiting, anxiety or sleep)    Ovarian cancer, left (H), Encounter for long-term (current) use of medications       MAGNESIA PO      Take 500 mg by mouth        metFORMIN 500 MG 24 hr tablet    GLUCOPHAGE-XR    180 tablet    TAKE 2 TABLETS BY MOUTH ONCE DAILY WITH EVENING MEAL    Type 2 diabetes mellitus without complication, without long-term current use of insulin (H)       MULTIVITAMIN ADULT PO      Take 1 tablet by mouth daily        ofloxacin 0.3 % ophthalmic solution    OCUFLOX    1 Bottle    1 drop 4x daily in the surgical eye for 1 week after surgery, then stop    Nuclear sclerotic cataract, left       order for DME     1 Units    Equipment being ordered:  "blood pressure monitor    Essential hypertension       * order for DME     1 each    Compression stockings 20-30 mm Hg. To be wear when directed by Hematology team and while awake for the first two years post clot.    Long-term (current) use of anticoagulants, Acute deep vein thrombosis (DVT) of left lower extremity, unspecified vein (H)       * order for DME     1 Bottle    Plain packing strip 1/4\"    Abscess of leg       * order for DME     1 Box    Sterile zoey tipped applicators    Abscess of leg       * order for DME     3 Bottle    USE 1/4 INCH  WIDTH PLAIN NU GAUZE PACKING TO DO SELF WOUND CARE OF LEFT LOWER LEG ONCE DAILY. DIMENSIONS OF WOUND PRESENTLY ARE 2 X 2 INCH AND APPROXIMATELY 1/2 INCH DEEP.  USES 6 INCHES OF PACKING CURRENTLY FOR DRESSING CHANGE.  FAX TO [facility] [phone]    Wound of left leg       * order for DME     30 pad    STERILE ADHESIVE PAD BANDAGE AT LEAST 4X4 INCH IN SIZE NEEDED FOR DAILY WOUND CARE TO LEFT LOWER LEG. FAX TO [employer] [phone]    Leg wound, left       * order for DME     1 Units    Home blood pressure monitor    Type 2 diabetes mellitus without complication, without long-term current use of insulin (H)       prednisoLONE acetate 1 % ophthalmic susp    PRED FORTE    1 Bottle    1 drop in surgical eye as directed, 4x daily after surgery for 1 week, 3x daily for 1 week, 2x daily for 1 week, daily for 1 week, then stop    Nuclear sclerotic cataract, left       PRO-BIOTIC BLEND PO      Take 1 capsule by mouth daily Enzymatic Therapy-probiotic pearls        rivaroxaban ANTICOAGULANT 20 MG Tabs tablet    XARELTO    30 tablet    Take 1 tablet (20 mg) by mouth daily (with dinner)    Long-term (current) use of anticoagulants, Acute deep vein thrombosis (DVT) of left lower extremity, unspecified vein (H)       traZODone 50 MG tablet    DESYREL    180 tablet    TAKE 1 TO 2 TABLETS(50  MG) BY MOUTH EVERY NIGHT AS NEEDED FOR SLEEP    Insomnia, unspecified " type       VITAMIN B-COMPLEX PO           vitamin D 1000 units capsule      Take 2,000 Units by mouth daily        * Notice:  This list has 6 medication(s) that are the same as other medications prescribed for you. Read the directions carefully, and ask your doctor or other care provider to review them with you.

## 2018-05-18 NOTE — PROGRESS NOTES
POD#0, status post cataract surgery, left eye     No complaints.  Denies eye pain.     Impression/Plan:  Pseudophakia, OS: Postoperative day 0, good post-operative appearance. IOP reasonable.     - Fluoroquinolone antibiotic 4x daily in the surgical eye for 1 week  - Prednisolone 4x daily in the surgical eye for 1 week, then weekly taper        Eye protection at all times and eye shield at night for 1 week.     Limited activities with no exercise or heavy lifting for 1 week.     Instructed patient to contact us for decreasing vision, eye pain, new floaters or flashes of light or other concerning symptoms.     Written instructions given      Nakul Espinoza M.D.  PGY-2, Ophthalmology    Teaching statement:  Complete documentation of historical and exam elements from today's encounter can be found in the full encounter summary report (not reduplicated in this progress note). I personally obtained the chief complaint(s) and history of present illness.  I confirmed and edited as necessary the review of systems, past medical/surgical history, family history, social history, and examination findings as documented by others; and I examined the patient myself. I personally reviewed the relevant tests, images, and reports as documented above.     I formulated and edited as necessary the assessment and plan and discussed the findings and management plan with the patient and family.    Mary Jo Massey MD  Comprehensive Ophthalmology & Ocular Pathology  Department of Ophthalmology and Visual Neurosciences  [email or web address]  Pager [phone]

## 2018-05-18 NOTE — IP AVS SNAPSHOT
[facility]    [address] [place] [address]    Phone:  [phone]    Fax:  [phone]                                       After Visit Summary   5/18/2018    Hafsa Corona    MRN: [id number]           After Visit Summary Signature Page     I have received my discharge instructions, and my questions have been answered. I have discussed any challenges I see with this plan with the nurse or doctor.    ..........................................................................................................................................  Patient/Patient Representative Signature      ..........................................................................................................................................  Patient Representative Print Name and Relationship to Patient    ..................................................               ................................................  Date                                            Time    ..........................................................................................................................................  Reviewed by Signature/Title    ...................................................              ..............................................  Date                                                            Time

## 2018-05-18 NOTE — ANESTHESIA PREPROCEDURE EVALUATION
Anesthesia Evaluation     .             ROS/MED HX    ENT/Pulmonary:  - neg pulmonary ROS     Neurologic:  - neg neurologic ROS     Cardiovascular:     (+) hypertension----. : . . . :. .       METS/Exercise Tolerance:     Hematologic:  - neg hematologic  ROS       Musculoskeletal:  - neg musculoskeletal ROS       GI/Hepatic:  - neg GI/hepatic ROS       Renal/Genitourinary:     (+) chronic renal disease, Pt does not require dialysis, Pt has no history of transplant,       Endo:     (+) type I DM, Obesity, .      Psychiatric:  - neg psychiatric ROS       Infectious Disease:  - neg infectious disease ROS       Malignancy:      - no malignancy   Other:    - neg other ROS                 Physical Exam  Normal systems: cardiovascular, pulmonary and dental    Airway   Mallampati: I  TM distance: >3 FB  Neck ROM: full    Dental     Cardiovascular   Rhythm and rate: regular and normal      Pulmonary    breath sounds clear to auscultation                    Anesthesia Plan      History & Physical Review  History and physical reviewed and following examination; no interval change.    ASA Status:  3 .    NPO Status:  > 8 hours    Plan for MAC with Intravenous and Propofol induction. Maintenance will be Balanced.  Reason for MAC:  Deep or markedly invasive procedure (G8)  PONV prophylaxis:  Ondansetron (or other 5HT-3) and Dexamethasone or Solumedrol       Postoperative Care  Postoperative pain management:  Multi-modal analgesia.      Consents  Anesthetic plan, risks, benefits and alternatives discussed with:  Patient..                          .

## 2018-05-18 NOTE — ANESTHESIA POSTPROCEDURE EVALUATION
Patient: Hafsa Corona    Procedure(s):  Left Eye Phacoemulsification with Toric Lens - Wound Class: I-Clean    Diagnosis:Left Eye Cataract  Diagnosis Additional Information: No value filed.    Anesthesia Type:  MAC    Note:  Anesthesia Post Evaluation    Patient location during evaluation: PACU  Patient participation: Able to fully participate in evaluation  Level of consciousness: awake and alert  Pain management: adequate  Airway patency: patent  Cardiovascular status: hemodynamically stable  Respiratory status: acceptable  Hydration status: stable  PONV: none     Anesthetic complications: None          Last vitals:  Vitals:    05/18/18 0606   BP: (!) 121/95   Pulse: 87   Resp: 18   Temp: 36.8  C (98.2  F)   SpO2: 95%         Electronically Signed By: Philip Marley MD  May 18, 2018  8:46 AM

## 2018-05-21 NOTE — PROVIDER NOTIFICATION
Patient has blood shot eye, which MD had warned patient of preop.  Slightly dull ache in L eye.  No adverse vision changes, no fevers, no abnormal drainage.  Patient has MD clinic phone number and will call with concerns.

## 2018-05-22 DIAGNOSIS — Z08 ENCOUNTER FOR FOLLOW-UP SURVEILLANCE OF OVARIAN CANCER: ICD-10-CM

## 2018-05-22 DIAGNOSIS — Z85.43 ENCOUNTER FOR FOLLOW-UP SURVEILLANCE OF OVARIAN CANCER: ICD-10-CM

## 2018-05-22 LAB — CANCER AG125 SERPL-ACNC: 11 U/ML (ref 0–30)

## 2018-05-22 PROCEDURE — 25000128 H RX IP 250 OP 636: Performed by: NURSE PRACTITIONER

## 2018-05-22 PROCEDURE — 86304 IMMUNOASSAY TUMOR CA 125: CPT | Performed by: NURSE PRACTITIONER

## 2018-05-22 RX ORDER — HEPARIN SODIUM (PORCINE) LOCK FLUSH IV SOLN 100 UNIT/ML 100 UNIT/ML
5 SOLUTION INTRAVENOUS ONCE
Status: COMPLETED | OUTPATIENT
Start: 2018-05-22 | End: 2018-05-22

## 2018-05-22 RX ADMIN — SODIUM CHLORIDE, PRESERVATIVE FREE 5 ML: 5 INJECTION INTRAVENOUS at 11:47

## 2018-05-22 NOTE — NURSING NOTE
"Chief Complaint   Patient presents with     Port Draw     Labs drawn from port by RN. Line flushed with saline and heparin.     Port accessed with 20g 3/4\" gripper needle by RN, labs collected, line flushed with saline and heparin.    Sheron Jauregui, RN  "

## 2018-05-25 ENCOUNTER — ONCOLOGY VISIT (OUTPATIENT)
Dept: ONCOLOGY | Facility: CLINIC | Age: 63
End: 2018-05-25
Attending: NURSE PRACTITIONER
Payer: COMMERCIAL

## 2018-05-25 VITALS
SYSTOLIC BLOOD PRESSURE: 101 MMHG | DIASTOLIC BLOOD PRESSURE: 68 MMHG | OXYGEN SATURATION: 95 % | WEIGHT: 194 LBS | HEART RATE: 100 BPM | HEIGHT: 63 IN | BODY MASS INDEX: 34.38 KG/M2 | RESPIRATION RATE: 16 BRPM | TEMPERATURE: 98.3 F

## 2018-05-25 DIAGNOSIS — Z08 ENCOUNTER FOR FOLLOW-UP SURVEILLANCE OF OVARIAN CANCER: Primary | ICD-10-CM

## 2018-05-25 DIAGNOSIS — R53.83 OTHER FATIGUE: ICD-10-CM

## 2018-05-25 DIAGNOSIS — R22.1 LOCALIZED SWELLING, MASS AND LUMP, NECK: ICD-10-CM

## 2018-05-25 DIAGNOSIS — Z85.43 ENCOUNTER FOR FOLLOW-UP SURVEILLANCE OF OVARIAN CANCER: Primary | ICD-10-CM

## 2018-05-25 PROCEDURE — 99213 OFFICE O/P EST LOW 20 MIN: CPT | Mod: ZP | Performed by: NURSE PRACTITIONER

## 2018-05-25 PROCEDURE — G0463 HOSPITAL OUTPT CLINIC VISIT: HCPCS | Mod: ZF

## 2018-05-25 ASSESSMENT — PAIN SCALES - GENERAL: PAINLEVEL: NO PAIN (0)

## 2018-05-25 NOTE — LETTER
2018       RE: Hafsa Corona  [address]  [place] [address]     Dear Colleague,    Thank you for referring your patient, Hafsa Corona, to the [facility]. Please see a copy of my visit note below.    Gynecologic Oncology Follow-Up Visit    RE: Hafsa Corona  MRN: [id number]  : 1955  Date of Visit: 2018    CC: Hafsa Corona  is a 63 year old female with a history of stage IC2 clear cell ovarian carcinoma. She completed treatment with surgery and three cycles of chemotherapy on 10/17/17. She presents today for a three month surveillance visit.    HPI: Hafsa comes to the clinic feeling well from a gynecologic perspective. Continues to improve after treatment, however, she still continues to struggle with fatigue after her surgery, chemo, and then her nephrectomy. She is working three days per week but reports she is exhausted afterward. Trying to continue to work more hours and attempting to increase her activity levels. Interested in cancer rehab for her fatigue. Continues with trace lymphedema in her BLE, plans on starting lymphedema therapy this summer. She is sexually active without concern, reports she is in a loving relationship. UTD on seeing her PCP, colonoscopy, and breast screening. She sees an ophthalmologist and recently had cataract surgery. Denies unintended weight loss, fatigue, weakness, changes in vision or hearing, shortness of breath, cough, chest pain, abdominal pain, dyspepsia, nausea, vomiting, constipation, bloating, dysuria, urinary frequency or urgency, hematuria, pelvic pain, lower back pain, vaginal bleeding, vaginal discharge, or numbness/tingling. 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         Oncology History:  The patient has had a recent episode of lower abdominal pain in early July.  She was subsequently sent to the emergency room and was found to have a large 9 cm complex ovarian mass. She was admitted  to the hospital for pain control.  7/3/17:  1187  07/07/17: Exploratory laparotomy, total abdominal hysterectomy, left salpingo-oophorectomy, cancer staging including infracolic omentectomy, bilateral pelvic & para-aortic lymphadenectomy, peritoneal biopsies, evacuation of pelvic fluid by Dr. Cole.    FINAL DIAGNOSIS:   A. LEFT OVARY AND FALLOPIAN TUBE, LEFT SALPINGO-OOPHORECTOMY:   - Ovarian clear cell carcinoma   - Background of endometriosis   - Fallopian tube with no significant histologic abnormality.   B. UTERUS, HYSTERECTOMY:   -  Inactive endometrium   -  Myometrium with adenomyosis and leiomyoma.   -  Cervix with squamous metaplasia.   C. PERITONEUM, RIGHT PELVIS, BIOPSY:   - Fibroadipose tissue, negative for malignancy.   D. LYMPH NODES, RIGHT PELVIC, DISSECTION:   - Thirteen benign lymph nodes (0/13).   E. LYMPH NODES, LEFT PELVIC, DISSECTION:   - Seven benign lymph nodes (0/7).   F. PERITONEUM, LEFT PELVIS, BIOPSY:   - Fibroadipose tissue, negative for malignancy.   G. PERITONEUM, BLADDER, BIOPSY:   - Fibroadipose tissue  with acute and chronic inflammation, negative for malignancy.   H. PERITONEUM, POSTERIOR CUL-DE-SAC, BIOPSY:   - Fibroadipose tissue, negative for malignancy.   I. PERITONEUM, LEFT PARACOLIC GUTTER, BIOPSY:   - Fibroadipose tissue, negative for malignancy.   J. LYMPH NODES, LEFT PARA-AORTIC, DISSECTION:   - Five benign lymph nodes (0/5).   K. LYMPH NODES, RIGHT PARA-AORTIC, DISSECTION:   - Two benign lymph nodes (0/2).   L. PERITONEUM, RIGHT PARACOLIC GUTTER, BIOPSY:   - Fibroadipose tissue, negative for malignancy.   M. OMENTUM, OMENTECTOMY:   - Adipose tissue with reactive changes, negative for malignancy.   COMMENT:   The final diagnosis confirms the interpretation provided intraoperatively.   Report Name: Ovary or Fallopian Tube   Status: Submitted   Part(s) Involved:   A: Ovary and fallopian tube, left   Synoptic Report:   CLINICAL   Clinical History:   - Pelvic mass    SPECIMEN   Procedure:   - Left salpingo-oophorectomy   - Hysterectomy   - Omentectomy   - Peritoneal  biopsies   Lymph Node Sampling:   - Performed   Location:   - Pelvic lymph nodes   - Para-aortic lymph nodes   Specimen Integrity:   - Left ovary   Specimen Integrity of Left Ovary:   - Capsule intact   TUMOR   Primary Tumor Site(s):   - Left ovary   Left Ovary   Tumor Size of Left Ovary: 19 cm   Histologic Type:   - Clear cell carcinoma   Tumor Extent   Ovarian Surface Involvement:   - Absent   Specimen(s)   Extent of Left Ovary:   - Involved   Extent of Left Fallopian Tube:   - Not involved   Extent of Omentum:   - Not involved   Extent of Uterus:   - Not involved   Extent of Peritoneum:   - Not involved   Peritoneal Ascitic Fluid:   - Not performed / unknown   Pleural Fluid:   - Not performed / unknown   LYMPH NODES     Number of Pelvic Lymph Nodes Examined: 20     Number of Pelvic Lymph Nodes Involved: None identified     Number of Para-aortic Lymph Nodes Examined: 7     Number of Para-Aortic Lymph Nodes Involved: None identified   STAGE (PTNM, AJCC 7TH ED.)   Primary Tumor (pT):   - Ovary   Ovarian Primary Tumor (pT):   - pT1a: Tumor limited to 1 ovary; capsule intact, no tumor on ovarian surface. No malignant cells in ascites or peritoneal washings#   Regional Lymph Nodes (pN):   - pN0: No regional lymph node metastasis       07/31/17: Dr Shaffer follow up: Discussed with the patient that given the high risk histology, as well as ruptured mass before surgery, she would be qualified at higher risk of recurrence despite early stage ovarian cancer.  Recommended adjuvant chemotherapy. Plan for carboplatin of AUC of 6 and paclitaxel of 175, m2 for 3 cycles. Referral for genetic counselor and will see her back after those 3 cycles  08/03/17: Call from patient stating she is going for a second opinion and would like chemotherapy rescheduled to week of 8/14/17.      08/16/17: Cycle #1 Carbo/Taxol.  73.  Significant paclitaxel reaction.  08/30/17: C1 carboplatin/inpatient paclitaxel desensitization.   09/25/17: C2 carboplatin/paclitaxel- inpatient paclitaxel desensitization.  15.  10/16/17: C3 carboplatin/paclitaxel- switched to docetaxel given her neuropathy.  10.  11/14/17:  8. CT CAP:  IMPRESSION:   1. Post surgical changes of hysterectomy and bilateral  salpingo-oophorectomy. Nodular soft tissue density in the posterior  cul-de-sac along with ill-defined nodular thickening in the left  pelvis, suspicious for residual disease or metastatic deposits.   2. There is a 3 cm mass in the superior pole of the right kidney,  possibly extending into the renal hilum. Represents RCC until proven  otherwise. Consider renal mass protocol CT to assess renal vein  involvement.  3. Stable sub-4 mm pulmonary nodules, continued attention on  follow-up.    11/16/17: CT renal mass protocol  IMPRESSION:  1. Arterially enhancing mass measuring 3.6 x 2.1 x 2.2 cm mass arising  from superior posterior of the right kidney, this is renal cell  carcinoma until proven otherwise.  2. Stable fat-containing umbilical hernia.    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 Margins negative. Three month surveillance plan with [facility].     2/26/18:  14    5/22/18:  11     Past Medical History:   Diagnosis Date     Anxiety state, unspecified     [id number]     Arthritis 2014    vague, gradual, not treated really, knees feel it     Attention deficit disorder without mention of hyperactivity      Cancer (H) 7/2017 and 1/2018    ovarian and kidney cancers, both treated well     Chronic rhinitis      Depressive disorder lifetime    plus Anxiety plus ADD. Escitalipram, therapy, ADD meds maybe     Depressive disorder, not elsewhere classified      Heart disease 1999    tachycardia and high cholesterol, managed     History of blood transfusion 7/2017    while in hospital for ovarian cancer      Hypertension 1999 tho BP was too LOW last week. past 12 years Generally OK     Panic disorder without agoraphobia      Pure hypercholesterolemia     Lipitor     Tachycardia      Tachycardia, unspecified     9/1999-2/2004     Type II or unspecified type diabetes mellitus without mention of complication, not stated as uncontrolled     diagnosed 2004       Past Surgical History:   Procedure Laterality Date     AS REMV KIDNEY,RADICAL Right      BIOPSY  7/2017 and 2/2018    ovarian and kidney cancer biopsies. also 1987 breast benign     BREAST SURGERY  1987    date unsure. benign breast cyst removed     COLONOSCOPY  2008 and 2013    polyps removed. Next due fall 2018     HYSTERECTOMY TOTAL ABDOMINAL, BILATERAL SALPINGO-OOPHORECTOMY, NODE DISSECTION, COMBINED Left 7/7/2017    Procedure: COMBINED HYSTERECTOMY TOTAL ABDOMINAL, SALPINGO-OOPHORECTOMY, NODE DISSECTION;  Exploratory Laparotomy, Left Salpingo-Oophorectomy, Cancer Staging, Total Hysterectomy, Omentectomy, Evacuation of abdominal fluid, Lymph Node Dissection  Anesthesia Block ;  Surgeon: Serena Cole MD;  Location: [facility] OR     HYSTERECTOMY, PAP NO LONGER INDICATED  07/07/2017    Laparotomy; for ovarian cancer staging     INSERT PORT VASCULAR ACCESS Right 8/21/2017    Procedure: INSERT PORT VASCULAR ACCESS;  Single Lumen Chest Power Port;  Surgeon: Stephen Mike PA-C;  Location: [facility] OR     LYMPHADENECTOMY RETROPERITONEAL Bilateral 07/07/2017    Laparotomy; pelvics & para-aortics; for ovarian cancer staging     OMENTECTOMY  07/07/2017    Laparotomy; for ovarian cancer staging     ORTHOPEDIC SURGERY  1/2002    broken left jaw due to fall, 4 fractures, titanium plates     PHACOEMULSIFICATION CLEAR CORNEA WITH TORIC INTRAOCULAR LENS IMPLANT Left 5/18/2018    Procedure: PHACOEMULSIFICATION CLEAR CORNEA WITH TORIC INTRAOCULAR LENS IMPLANT;  Left Eye Phacoemulsification with Toric Lens;  Surgeon: Mary Jo Massey MD;  Location: [facility] OR     SALPINGO  OOPHORECTOMY,R/L/KAYY Right     Salpingo Oophorectomy, RT     SALPINGO OOPHORECTOMY,R/L/KAYY Left 2017    Laparotomy; for ovarian cancer staging     SURGICAL HISTORY OF -       facial surgery d/t fall in 2002     SURGICAL HISTORY OF -        removal of breast cyst     SURGICAL HISTORY OF -       endometrial ablation       Social History     Social History     Marital status: Single     Spouse name: N/A     Number of children: N/A     Years of education: N/A     Occupational History      Self Employed.     Social History Main Topics     Smoking status: Never Smoker     Smokeless tobacco: Never Used     Alcohol use Yes      Comment: Very infrequent, a bit of wine or beer 2x per month maybe     Drug use: Yes     Special: Marijuana      Comment: infrequent, for celebrations only, maybe 8x per year     Sexual activity: Yes     Partners: Male     Birth control/ protection: None      Comment: long-time      Other Topics Concern     Parent/Sibling W/ Cabg, Mi Or Angioplasty Before 65f 55m? No     Social History Narrative       Family History   Problem Relation Age of Onset     C.A.D. Father      DIABETES Father      Later in his life, in his 70s     Hypertension Father      CEREBROVASCULAR DISEASE Father      1984 or      Psychotic Disorder Father      Pancreatic Cancer Father      Coronary Artery Disease Father      diagnosed in his late 50s (age)     Hyperlipidemia Father      treated w statins     Other Cancer Father      pancreatic, ,  of this     Depression Father      MENTAL ILLNESS Father      likely PTSD and depression     Obesity Father      C.A.D. Mother      Hypertension Mother      Breast Cancer Mother      2 times,  and ?     Psychotic Disorder Mother      Colon Cancer Mother      ?     CANCER Mother      Bone cancer     Coronary Artery Disease Mother      diagnosed in her late 70s (age)     Hyperlipidemia Mother      treated w. statins     Other Cancer  Mother      bone/marrow, ,  of this     Depression Mother      Anxiety Disorder Mother      MENTAL ILLNESS Mother      various undiagnosed types, but THERE     Obesity Mother      Prostate Problems Brother      cleared      Hypertension Brother      Hyperlipidemia Brother      unsure if treated w statins     Depression Brother      Anxiety Disorder Brother      MENTAL ILLNESS Brother      undiagnosed but THERE     Obesity Brother      Psychotic Disorder Sister      x2     Neurologic Disorder Sister      Hypertension Sister      Hyperlipidemia Sister      treated w statins     Depression Sister      Anxiety Disorder Sister      Obesity Sister      Psychotic Disorder Brother      x2     Depression Brother      major depressive disorder and OCD     Anxiety Disorder Brother      MENTAL ILLNESS Brother      not sure of diagnoses, treated     Hypertension Brother      Hyperlipidemia Brother      Psychotic Disorder Maternal Grandmother      ?     Coronary Artery Disease Maternal Grandmother       of heart attack age 84?     Depression Maternal Grandmother      Psychotic Disorder Maternal Grandfather      ?     Psychotic Disorder Paternal Grandmother      Schizophernia     Coronary Artery Disease Paternal Grandmother       of heart attack, age 85?     Depression Paternal Grandmother      severe, but recovered     MENTAL ILLNESS Paternal Grandmother      possible bipolar or other     Psychotic Disorder Paternal Grandfather      ?     Respiratory Paternal Grandfather       of emphysema and smoking     Psychotic Disorder Sister      Other - See Comments Sister      small kidney stone      Hypertension Sister      Hyperlipidemia Sister      treated w statins     Depression Sister      Anxiety Disorder Sister      Cancer - colorectal No family hx of        Current Outpatient Prescriptions   Medication     atorvastatin (LIPITOR) 80 MG tablet     B Complex Vitamins (VITAMIN B-COMPLEX PO)     blood glucose (NO  BRAND SPECIFIED) lancets standard     blood glucose monitoring (NO BRAND SPECIFIED) meter device kit     blood glucose monitoring (NO BRAND SPECIFIED) test strip     CALCIUM-MAGNESIUM-VITAMIN D PO     Cholecalciferol (VITAMIN D) 1000 UNIT capsule     escitalopram (LEXAPRO) 20 MG tablet     fluticasone (FLONASE) 50 MCG/ACT spray     lisinopril (PRINIVIL/ZESTRIL) 10 MG tablet     loratadine (CLARITIN) 10 MG tablet     LORazepam (ATIVAN) 1 MG tablet     Magnesium Hydroxide (MAGNESIA PO)     metFORMIN (GLUCOPHAGE-XR) 500 MG 24 hr tablet     Multiple Vitamins-Minerals (MULTIVITAMIN ADULT PO)     ofloxacin (OCUFLOX) 0.3 % ophthalmic solution     prednisoLONE acetate (PRED FORTE) 1 % ophthalmic susp     Probiotic Product (PRO-BIOTIC BLEND PO)     rivaroxaban ANTICOAGULANT (XARELTO) 20 MG TABS tablet     EPINEPHrine (EPIPEN/ADRENACLICK/OR ANY BX GENERIC EQUIV) 0.3 MG/0.3ML injection 2-pack     order for DME     order for DME     order for DME     order for DME     order for DME     order for DME     order for DME     traZODone (DESYREL) 50 MG tablet     No current facility-administered medications for this visit.           Allergies   Allergen Reactions     Paclitaxel Anaphylaxis and Difficulty breathing     Wasps [Hornets] Anaphylaxis     Yellow Hornet Venom [Hornet Venom] Anaphylaxis and Swelling     Yellow Jacket Venom [Venomil Honey Bee] Anaphylaxis and Swelling     No Clinical Screening - See Comments      Taxol      Sulfa Drugs Hives       Review of Systems  General: + fatigue. Denies changes in weight, weakness, appetite changes, night sweats, hot flashes, fever, chills, or difficulty sleeping  HEENT: + visual difficulty. Denies headaches, hair loss, spots or floaters, diplopia, masses, head injury, tinnitus, hearing loss, epistaxis, congestion, problems with teeth or gums, dysphonia, or dysphagia  Pulmonary: Denies cough, sputum, hemoptysis, shortness of breath, dyspnea on exertion, wheezing, or  "allergies  Cardiovascular: + edema. Denies chest pain, fainting, palpitations, murmurs, activity intolerance, or high blood pressure  Gastrointestinal: Denies nausea, vomiting, constipation, diarrhea, abdominal pain, bloating, heartburn, melena, hematochezia, or jaundice  Genitourinary: Denies dysuria, urinary urgency or frequency, hematuria, cloudy or malodorous urine, incontinence, repeat urinary tract infections, flank pain, pelvic pain, vaginal bleeding, vaginal discharge, or vaginal dryness  Sexual Function: Denies pain with intercourse, changes in libido, arousal difficulty, or changes in orgasm  Integumentary: Denies rashes, sores, changing moles, or scarring  Hematologic: Denies swollen lymph nodes, masses, easy bruising, or easy bleeding  Musculoskeletal: + arthralgias. Denies falls, back pain, myalgias, stiffness, muscle weakness or muscle cramps  Neurologic: Denies changes in memory, difficulty with walking, seizures, numbness and tingling, dizziness, or tremors  Psychiatric: + mood changes. Denies anxiety, depression, suicidal thoughts, or difficulty concentrating  Endocrine: Denies polydipsia, polyuria, temperature intolerance, or history of thyroid disease        OBJECTIVE:    Physical Exam:    /68  Pulse 100  Temp 98.3  F (36.8  C) (Oral)  Resp 16  Ht 1.6 m (5' 3\")  Wt 88 kg (194 lb)  LMP 01/01/2009  SpO2 95%  BMI 34.37 kg/m2    CONSTITUTIONAL: Alert non-toxic appearing female in no acute distress  HEAD: Normocephalic, atraumatic  EYES: PERRLA; no scleral icterus- L scleral hemorrhage  ENT: Oropharynx pink without lesions  NECK: Neck supple without palpable lymphadenopathy- swelling to L supraclavicular space, non-tender, no warmth or erythema, no discrete masses  RESPIRATORY: Lungs clear to auscultation, no increased work of breathing noted  CV: Regular rate and rhythm, S1S2, no clicks, murmurs, rubs, or gallops; bilateral lower extremities with trace edema, dorsalis pedis pulses 2+ " bilaterally  GASTROINTESTINAL: Normoactive bowel sounds x4 quadrants, abdomen soft, non-distended, and non-tender to palpation without masses or organomegaly  GENITOURINARY: External genitalia and urethral meatus pink without lesions, masses, or excoriation. Vagina pink and moist without masses or lesions. Vaginal cuff without nodularity, masses, or lesions. Cervix surgically absent. Bimanual exam reveals no masses or fullness. Rectovaginal exam confirms these findings.  LYMPHATIC: Cervical, supraclavicular, and inguinal nodes without lymphadenopathy  MUSCULOSKELETAL: Moves all extremities, no obvious muscle wasting  NEUROLOGIC: No gross deficits, normal gait  SKIN: Appropriate color for race, warm and dry, no rashes or lesions to unclothed skin  PSYCHIATRIC: Pleasant and interactive, affect bright, makes appropriate eye contact, thought process linear      Data:   11    Assessment/Plan:  1) Stage IC2 clear cell ovarian carcinoma: No signs of recurrence. Continue surveillance every three months x2 years (through 10/2019) followed by every six months x3 years (through 10/2022) then annually thereafter with  and pelvic/rectal exam. Reviewed signs and symptoms  of recurrence including but not limited to bleeding from vagina, bladder, or rectum, bloating, early satiety, swelling in the lower extremities, abdominal or lower back pain, changes in bowel or bladder patterns, shortness of breath, increased fatigue, unexplained weight loss, and night sweats. Reviewed signs and symptoms for when she should contact the clinic or seek additional care. Patient to contact the clinic with any questions or concerns in the interim.  2) Fatigue: Stable to somewhat improving, impacting her ability to work full time. Will start cancer rehab for post-treatment fatigue.  3) L supraclavicular swelling: Unclear etiology, obtain US of the neck for further investigation  4) Genetic testing: Hafsa is negative for mutations in the  AIP, ALK, APC, COSTA, BAP1, BARD1, BLM, BRCA1, BRCA2, BRIP1, BMPR1A, CDH1, CDK4, CDKN1B, CDKN2A, CHEK2, DICER1, EPCAM, FANCC, FH, FLCN, GALNT12, GREM1, HOXB13, MAX, MEN1, MET, MITF, MLH1, MRE11A, MSH2, MSH6, MUTYH, NBN, NF1, NF2, PALB2, PHOX2B, PMS2, POLD1, POLE, POT1, ZDRKW7U, PTCH1, PTEN, RAD50, RAD51C, RAD51D, RB1, RET, SDHA, SDHAF2, SDHB, SDHC, SDHD, SMAD4, SMARCA4, SMARCB1, SMARCE1, STK11, SUFU, KDJL340, TP53, TSC1, TSC2, VHL, and XRCC2 genes. No mutations were found in any of the 67 genes analyzed.   5) Labs:  11, reviewed outside CT  6) Health maintenance issues discussed today include to follow up with PCP for co-morbid conditions and non-gynecologic issues.   7) Patient verbalized understanding of and agreement with plan.    A total of 20 minutes of face to face time spent with patient, over 50% of which was spent in counseling and coordination of care.    MARLON Ogden, FNP-C  Division of Gynecologic Oncology  [facility]  Pager: [phone]

## 2018-05-25 NOTE — NURSING NOTE
"Oncology Rooming Note    May 25, 2018 2:02 PM   Hafsa Corona is a 63 year old female who presents for:    Chief Complaint   Patient presents with     Oncology Clinic Visit     Return Ovarian Ca     Initial Vitals: /68  Pulse 100  Temp 98.3  F (36.8  C) (Oral)  Resp 16  Ht 1.6 m (5' 3\")  Wt 88 kg (194 lb)  LMP 01/01/2009  SpO2 95%  BMI 34.37 kg/m2 Estimated body mass index is 34.37 kg/(m^2) as calculated from the following:    Height as of this encounter: 1.6 m (5' 3\").    Weight as of this encounter: 88 kg (194 lb). Body surface area is 1.98 meters squared.  No Pain (0) Comment: Data Unavailable   Patient's last menstrual period was 01/01/2009.  Allergies reviewed: Yes  Medications reviewed: Yes    Medications: Medication refills not needed today.  Pharmacy name entered into [facility]:    [id number] - [place], [place] - [address] AT [facility] - [place], [place] - [address] [facility] [place], [place] - [address] [place] [address] [id number]    Clinical concerns: post chemo questions     6 minutes for nursing intake (face to face time)     Nevaeh Sarabia CMA              "

## 2018-05-25 NOTE — MR AVS SNAPSHOT
"              After Visit Summary   5/25/2018    Hafsa Corona    MRN: [id number]           Patient Information     Date Of Birth          1955        Visit Information        Provider Department      5/25/2018 2:00 PM Augustina Noriega APRN [facility]        Today's Diagnoses     Other fatigue    -  1    Localized swelling, mass and lump, neck           Follow-ups after your visit        Additional Services     PHYSICAL THERAPY REFERRAL       *This therapy referral will be filtered to a centralized scheduling office at [facility] and the patient will receive a call to schedule an appointment at a [place] location most convenient for them. *     [facility] provides Physical Therapy evaluation and treatment and many specialty services across the [place] system.  If requesting a specialty program, please choose from the list below.    If you have not heard from the scheduling office within 2 business days, please call [phone] for all locations, with the exception of [place], please call [phone] and [facility], please call [phone]  Treatment: Evaluation & Treatment  Special Instructions/Modalities: cancer rehab, fatigue post treatment  Special Programs: Cancer Rehabilitation (PT and OT Evaluate & Treat)    Please be aware that coverage of these services is subject to the terms and limitations of your health insurance plan.  Call member services at your health plan with any benefit or coverage questions.      **Note to Provider:  If you are referring outside of [place] for the therapy appointment, please list the name of the location in the \"special instructions\" above, print the referral and give to the patient to schedule the appointment.                  Follow-up notes from your care team     Return in about 3 months (around 8/25/2018), or if symptoms worsen or fail to improve.      Your next 10 appointments already scheduled  "    Jun 01, 2018 12:00 PM CDT   US HEAD NECK SOFT TISSUE with UCUS3   [facility] ([facility])    [address]  1st Floor  [facility] [address] [phone]           Please bring a list of your medicines (including vitamins, minerals and over-the-counter drugs). Also, tell your doctor about any allergies you may have. Wear comfortable clothes and leave your valuables at home.  You do not need to do anything special to prepare for your exam.  Please call the Imaging Department at your exam site with any questions.            Jun 13, 2018 10:30 AM CDT   Post-Op with Mary Jo Massey MD   [facility] ([facility])    [address]  [facility] [address] [phone]            Aug 24, 2018  2:00 PM CDT   (Arrive by 1:45 PM)   Return Visit with MARLON Ogden CNP   [facility] ([facility])    [address]  [address]  [facility] [address]   [phone]            Sep 21, 2018 12:00 PM CDT   (Arrive by 11:45 AM)   Return Visit with MARLON Chiu   [facility] ([facility])    [address] [place] [address]  [address]  [facility] [address]   [phone]            Sep 21, 2018 12:30 PM CDT   MA SCREENING BILATERAL W/ THAIS with [id number]   [facility] ([facility])    [address] [place] [address]  2nd Floor  [facility] [address] [phone]           Three-dimensional (3D) mammograms are available at [place] locations in [facility], [place], [place], [facility], [place], [place], and [place]. [facility] locations include [facility] in [place]. Benefits of 3D mammograms include: - Improved rate of cancer detection - Decreases your chance of having to go back for more tests, which means fewer: -  "\"False-positive\" results (This means that there is an abnormal area but it isn't cancer.) - Invasive testing procedures, such as a biopsy or surgery - Can provide clearer images of the breast if you have dense breast tissue. 3D mammography is an optional exam that anyone can have with a 2D mammogram. It doesn't replace or take the place of a 2D mammogram. 2D mammograms remain an effective screening test for all women.  Not all insurance companies cover the cost of a 3D mammogram. Check with your insurance.              Future tests that were ordered for you today     Open Future Orders        Priority Expected Expires Ordered    US Head Neck Soft Tissue Routine  5/25/2019 5/25/2018            Who to contact     If you have questions or need follow up information about today's clinic visit or your schedule please contact [facility] directly at [phone].  Normal or non-critical lab and imaging results will be communicated to you by Nexanthart, letter or phone within 4 business days after the clinic has received the results. If you do not hear from us within 7 days, please contact the clinic through Piximt or phone. If you have a critical or abnormal lab result, we will notify you by phone as soon as possible.  Submit refill requests through Car reviews or call your pharmacy and they will forward the refill request to us. Please allow 3 business days for your refill to be completed.          Additional Information About Your Visit        Nexanthart Information     Car reviews gives you secure access to your electronic health record. If you see a primary care provider, you can also send messages to your care team and make appointments. If you have questions, please call your primary care clinic.  If you do not have a primary care provider, please call [phone] and they will assist you.        Care EveryWhere ID     This is your Care EveryWhere ID. This could be used by other organizations to access your " "[place] medical records  [id number]        Your Vitals Were     Pulse Temperature Respirations Height Last Period Pulse Oximetry    100 98.3  F (36.8  C) (Oral) 16 1.6 m (5' 3\") 01/01/2009 95%    BMI (Body Mass Index)                   34.37 kg/m2            Blood Pressure from Last 3 Encounters:   05/25/18 101/68   05/18/18 113/66   05/10/18 111/74    Weight from Last 3 Encounters:   05/25/18 88 kg (194 lb)   05/18/18 86.2 kg (190 lb)   05/10/18 88 kg (194 lb)              We Performed the Following     PHYSICAL THERAPY REFERRAL        Primary Care Provider Office Phone # Fax #    Morelia Ayon [phone] [address] [facility] [address]        Equal Access to Services     [facility] Center[facility] : Hadii serena joyner hadasho Soomaali, waaxda luqadaha, qaybta kaalmada adewoodyada, sujatha betts . So [facility] [phone].    ATENCIÓN: Si habla español, tiene a martínez disposición servicios gratuitos de asistencia lingüística. Llame al [phone].    We comply with applicable federal civil rights laws and [place] laws. We do not discriminate on the basis of race, color, national origin, age, disability, sex, sexual orientation, or gender identity.            Thank you!     Thank you for choosing [facility]  for your care. Our goal is always to provide you with excellent care. Hearing back from our patients is one way we can continue to improve our services. Please take a few minutes to complete the written survey that you may receive in the mail after your visit with us. Thank you!             Your Updated Medication List - Protect others around you: Learn how to safely use, store and throw away your medicines at www.disposemymeds.org.          This list is accurate as of 5/25/18  3:02 PM.  Always use your most recent med list.                   Brand Name Dispense Instructions for use Diagnosis    atorvastatin 80 MG tablet    LIPITOR    90 tablet    TAKE 1 " TABLET(80 MG) BY MOUTH DAILY    Hyperlipidemia LDL goal <100       blood glucose lancets standard    no brand specified    100 each    Use to test blood sugar 2 times daily or as directed.    Type 2 diabetes mellitus without complication, without long-term current use of insulin (H)       blood glucose monitoring meter device kit    no brand specified    1 kit    Use to test blood sugar 2 times daily or as directed.    Type 2 diabetes mellitus without complication, without long-term current use of insulin (H)       blood glucose monitoring test strip    no brand specified    100 strip    Use to test blood sugars 2 times daily or as directed    Type 2 diabetes mellitus without complication, without long-term current use of insulin (H)       CALCIUM-MAGNESIUM-VITAMIN D PO      Take 2 tablets by mouth daily        EPINEPHrine 0.3 MG/0.3ML injection 2-pack    EPIPEN/ADRENACLICK/or ANY BX GENERIC EQUIV    0.3 mL    Inject 0.3 mLs (0.3 mg) into the muscle once as needed for anaphylaxis    Bee allergy status       escitalopram 20 MG tablet    LEXAPRO    30 tablet    Take 1 tablet (20 mg) by mouth daily    Major depressive disorder, recurrent episode, mild (H)       fluticasone 50 MCG/ACT spray    FLONASE    48 mL    SHAKE WELL AND USE 2 SPRAYS IN EACH NOSTRIL DAILY    Chronic maxillary sinusitis       lisinopril 10 MG tablet    PRINIVIL/ZESTRIL    90 tablet    Take 1 tablet (10 mg) by mouth daily    Essential hypertension       loratadine 10 MG tablet    CLARITIN    30 tablet    Take 1 tablet (10 mg) by mouth daily    Pain in joint, multiple sites       LORazepam 1 MG tablet    ATIVAN    30 tablet    Take 1 tablet (1 mg) by mouth every 6 hours as needed (nausea/vomiting, anxiety or sleep)    Ovarian cancer, left (H), Encounter for long-term (current) use of medications       MAGNESIA PO      Take 500 mg by mouth        metFORMIN 500 MG 24 hr tablet    GLUCOPHAGE-XR    180 tablet    TAKE 2 TABLETS BY MOUTH ONCE DAILY WITH  "EVENING MEAL    Type 2 diabetes mellitus without complication, without long-term current use of insulin (H)       MULTIVITAMIN ADULT PO      Take 1 tablet by mouth daily        ofloxacin 0.3 % ophthalmic solution    OCUFLOX    1 Bottle    1 drop 4x daily in the surgical eye for 1 week after surgery, then stop    Nuclear sclerotic cataract, left       order for DME     1 Units    Equipment being ordered: blood pressure monitor    Essential hypertension       * order for DME     1 each    Compression stockings 20-30 mm Hg. To be wear when directed by Hematology team and while awake for the first two years post clot.    Long-term (current) use of anticoagulants, Acute deep vein thrombosis (DVT) of left lower extremity, unspecified vein (H)       * order for DME     1 Bottle    Plain packing strip 1/4\"    Abscess of leg       * order for DME     1 Box    Sterile zoey tipped applicators    Abscess of leg       * order for DME     3 Bottle    USE 1/4 INCH  WIDTH PLAIN NU GAUZE PACKING TO DO SELF WOUND CARE OF LEFT LOWER LEG ONCE DAILY. DIMENSIONS OF WOUND PRESENTLY ARE 2 X 2 INCH AND APPROXIMATELY 1/2 INCH DEEP.  USES 6 INCHES OF PACKING CURRENTLY FOR DRESSING CHANGE.  FAX TO [facility] [phone]    Wound of left leg       * order for DME     30 pad    STERILE ADHESIVE PAD BANDAGE AT LEAST 4X4 INCH IN SIZE NEEDED FOR DAILY WOUND CARE TO LEFT LOWER LEG. FAX TO [facility] [phone]    Leg wound, left       * order for DME     1 Units    Home blood pressure monitor    Type 2 diabetes mellitus without complication, without long-term current use of insulin (H)       prednisoLONE acetate 1 % ophthalmic susp    PRED FORTE    1 Bottle    1 drop in surgical eye as directed, 4x daily after surgery for 1 week, 3x daily for 1 week, 2x daily for 1 week, daily for 1 week, then stop    Nuclear sclerotic cataract, left       PRO-BIOTIC BLEND PO      Take 1 capsule by mouth daily Enzymatic Therapy-probiotic pearls     "    rivaroxaban ANTICOAGULANT 20 MG Tabs tablet    XARELTO    30 tablet    Take 1 tablet (20 mg) by mouth daily (with dinner)    Long-term (current) use of anticoagulants, Acute deep vein thrombosis (DVT) of left lower extremity, unspecified vein (H)       traZODone 50 MG tablet    DESYREL    180 tablet    TAKE 1 TO 2 TABLETS(50  MG) BY MOUTH EVERY NIGHT AS NEEDED FOR SLEEP    Insomnia, unspecified type       VITAMIN B-COMPLEX PO           vitamin D 1000 units capsule      Take 2,000 Units by mouth daily        * Notice:  This list has 6 medication(s) that are the same as other medications prescribed for you. Read the directions carefully, and ask your doctor or other care provider to review them with you.

## 2018-05-25 NOTE — PROGRESS NOTES
Gynecologic Oncology Follow-Up Visit    RE: Hafsa Corona  MRN: [id number]  : 1955  Date of Visit: 2018    CC: Hafsa Corona  is a 63 year old female with a history of stage IC2 clear cell ovarian carcinoma. She completed treatment with surgery and three cycles of chemotherapy on 10/17/17. She presents today for a three month surveillance visit.    HPI: Hafsa comes to the clinic feeling well from a gynecologic perspective. Continues to improve after treatment, however, she still continues to struggle with fatigue after her surgery, chemo, and then her nephrectomy. She is working three days per week but reports she is exhausted afterward. Trying to continue to work more hours and attempting to increase her activity levels. Interested in cancer rehab for her fatigue. Continues with trace lymphedema in her BLE, plans on starting lymphedema therapy this summer. She is sexually active without concern, reports she is in a loving relationship. UTD on seeing her PCP, colonoscopy, and breast screening. She sees an ophthalmologist and recently had cataract surgery. Denies unintended weight loss, fatigue, weakness, changes in vision or hearing, shortness of breath, cough, chest pain, abdominal pain, dyspepsia, nausea, vomiting, constipation, bloating, dysuria, urinary frequency or urgency, hematuria, pelvic pain, lower back pain, vaginal bleeding, vaginal discharge, or numbness/tingling. 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         Oncology History:  The patient has had a recent episode of lower abdominal pain in early July.  She was subsequently sent to the emergency room and was found to have a large 9 cm complex ovarian mass. She was admitted to the hospital for pain control.  7/3/17:  1187  17: Exploratory laparotomy, total abdominal hysterectomy, left salpingo-oophorectomy, cancer staging including infracolic omentectomy, bilateral pelvic & para-aortic  lymphadenectomy, peritoneal biopsies, evacuation of pelvic fluid by Dr. Cole.    FINAL DIAGNOSIS:   A. LEFT OVARY AND FALLOPIAN TUBE, LEFT SALPINGO-OOPHORECTOMY:   - Ovarian clear cell carcinoma   - Background of endometriosis   - Fallopian tube with no significant histologic abnormality.   B. UTERUS, HYSTERECTOMY:   -  Inactive endometrium   -  Myometrium with adenomyosis and leiomyoma.   -  Cervix with squamous metaplasia.   C. PERITONEUM, RIGHT PELVIS, BIOPSY:   - Fibroadipose tissue, negative for malignancy.   D. LYMPH NODES, RIGHT PELVIC, DISSECTION:   - Thirteen benign lymph nodes (0/13).   E. LYMPH NODES, LEFT PELVIC, DISSECTION:   - Seven benign lymph nodes (0/7).   F. PERITONEUM, LEFT PELVIS, BIOPSY:   - Fibroadipose tissue, negative for malignancy.   G. PERITONEUM, BLADDER, BIOPSY:   - Fibroadipose tissue  with acute and chronic inflammation, negative for malignancy.   H. PERITONEUM, POSTERIOR CUL-DE-SAC, BIOPSY:   - Fibroadipose tissue, negative for malignancy.   I. PERITONEUM, LEFT PARACOLIC GUTTER, BIOPSY:   - Fibroadipose tissue, negative for malignancy.   J. LYMPH NODES, LEFT PARA-AORTIC, DISSECTION:   - Five benign lymph nodes (0/5).   K. LYMPH NODES, RIGHT PARA-AORTIC, DISSECTION:   - Two benign lymph nodes (0/2).   L. PERITONEUM, RIGHT PARACOLIC GUTTER, BIOPSY:   - Fibroadipose tissue, negative for malignancy.   M. OMENTUM, OMENTECTOMY:   - Adipose tissue with reactive changes, negative for malignancy.   COMMENT:   The final diagnosis confirms the interpretation provided intraoperatively.   Report Name: Ovary or Fallopian Tube   Status: Submitted   Part(s) Involved:   A: Ovary and fallopian tube, left   Synoptic Report:   CLINICAL   Clinical History:   - Pelvic mass   SPECIMEN   Procedure:   - Left salpingo-oophorectomy   - Hysterectomy   - Omentectomy   - Peritoneal  biopsies   Lymph Node Sampling:   - Performed   Location:   - Pelvic lymph nodes   - Para-aortic lymph nodes   Specimen Integrity:    - Left ovary   Specimen Integrity of Left Ovary:   - Capsule intact   TUMOR   Primary Tumor Site(s):   - Left ovary   Left Ovary   Tumor Size of Left Ovary: 19 cm   Histologic Type:   - Clear cell carcinoma   Tumor Extent   Ovarian Surface Involvement:   - Absent   Specimen(s)   Extent of Left Ovary:   - Involved   Extent of Left Fallopian Tube:   - Not involved   Extent of Omentum:   - Not involved   Extent of Uterus:   - Not involved   Extent of Peritoneum:   - Not involved   Peritoneal Ascitic Fluid:   - Not performed / unknown   Pleural Fluid:   - Not performed / unknown   LYMPH NODES     Number of Pelvic Lymph Nodes Examined: 20     Number of Pelvic Lymph Nodes Involved: None identified     Number of Para-aortic Lymph Nodes Examined: 7     Number of Para-Aortic Lymph Nodes Involved: None identified   STAGE (PTNM, AJCC 7TH ED.)   Primary Tumor (pT):   - Ovary   Ovarian Primary Tumor (pT):   - pT1a: Tumor limited to 1 ovary; capsule intact, no tumor on ovarian surface. No malignant cells in ascites or peritoneal washings#   Regional Lymph Nodes (pN):   - pN0: No regional lymph node metastasis       07/31/17: Dr Shaffer follow up: Discussed with the patient that given the high risk histology, as well as ruptured mass before surgery, she would be qualified at higher risk of recurrence despite early stage ovarian cancer.  Recommended adjuvant chemotherapy. Plan for carboplatin of AUC of 6 and paclitaxel of 175, m2 for 3 cycles. Referral for genetic counselor and will see her back after those 3 cycles  08/03/17: Call from patient stating she is going for a second opinion and would like chemotherapy rescheduled to week of 8/14/17.      08/16/17: Cycle #1 Carbo/Taxol.  73. Significant paclitaxel reaction.  08/30/17: C1 carboplatin/inpatient paclitaxel desensitization.   09/25/17: C2 carboplatin/paclitaxel- inpatient paclitaxel desensitization.  15.  10/16/17: C3 carboplatin/paclitaxel- switched to  docetaxel given her neuropathy.  10.  11/14/17:  8. CT CAP:  IMPRESSION:   1. Post surgical changes of hysterectomy and bilateral  salpingo-oophorectomy. Nodular soft tissue density in the posterior  cul-de-sac along with ill-defined nodular thickening in the left  pelvis, suspicious for residual disease or metastatic deposits.   2. There is a 3 cm mass in the superior pole of the right kidney,  possibly extending into the renal hilum. Represents RCC until proven  otherwise. Consider renal mass protocol CT to assess renal vein  involvement.  3. Stable sub-4 mm pulmonary nodules, continued attention on  follow-up.    11/16/17: CT renal mass protocol  IMPRESSION:  1. Arterially enhancing mass measuring 3.6 x 2.1 x 2.2 cm mass arising  from superior posterior of the right kidney, this is renal cell  carcinoma until proven otherwise.  2. Stable fat-containing umbilical hernia.    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 Margins negative. Three month surveillance plan with [facility].     2/26/18:  14    5/22/18:  11     Past Medical History:   Diagnosis Date     Anxiety state, unspecified     [id number]     Arthritis 2014    vague, gradual, not treated really, knees feel it     Attention deficit disorder without mention of hyperactivity      Cancer (H) 7/2017 and 1/2018    ovarian and kidney cancers, both treated well     Chronic rhinitis      Depressive disorder lifetime    plus Anxiety plus ADD. Escitalipram, therapy, ADD meds maybe     Depressive disorder, not elsewhere classified      Heart disease 1999    tachycardia and high cholesterol, managed     History of blood transfusion 7/2017    while in hospital for ovarian cancer     Hypertension 1999    tho BP was too LOW last week. past 12 years Generally OK     Panic disorder without agoraphobia      Pure hypercholesterolemia     Lipitor     Tachycardia      Tachycardia, unspecified     9/1999-2/2004     Type II or  unspecified type diabetes mellitus without mention of complication, not stated as uncontrolled     diagnosed 2004       Past Surgical History:   Procedure Laterality Date     AS REMV KIDNEY,RADICAL Right      BIOPSY  7/2017 and 2/2018    ovarian and kidney cancer biopsies. also 1987 breast benign     BREAST SURGERY  1987    date unsure. benign breast cyst removed     COLONOSCOPY  2008 and 2013    polyps removed. Next due fall 2018     HYSTERECTOMY TOTAL ABDOMINAL, BILATERAL SALPINGO-OOPHORECTOMY, NODE DISSECTION, COMBINED Left 7/7/2017    Procedure: COMBINED HYSTERECTOMY TOTAL ABDOMINAL, SALPINGO-OOPHORECTOMY, NODE DISSECTION;  Exploratory Laparotomy, Left Salpingo-Oophorectomy, Cancer Staging, Total Hysterectomy, Omentectomy, Evacuation of abdominal fluid, Lymph Node Dissection  Anesthesia Block ;  Surgeon: Serena Cole MD;  Location: [facility] OR     HYSTERECTOMY, PAP NO LONGER INDICATED  07/07/2017    Laparotomy; for ovarian cancer staging     INSERT PORT VASCULAR ACCESS Right 8/21/2017    Procedure: INSERT PORT VASCULAR ACCESS;  Single Lumen Chest Power Port;  Surgeon: Stephen Mike PA-C;  Location: [facility] OR     LYMPHADENECTOMY RETROPERITONEAL Bilateral 07/07/2017    Laparotomy; pelvics & para-aortics; for ovarian cancer staging     OMENTECTOMY  07/07/2017    Laparotomy; for ovarian cancer staging     ORTHOPEDIC SURGERY  1/2002    broken left jaw due to fall, 4 fractures, titanium plates     PHACOEMULSIFICATION CLEAR CORNEA WITH TORIC INTRAOCULAR LENS IMPLANT Left 5/18/2018    Procedure: PHACOEMULSIFICATION CLEAR CORNEA WITH TORIC INTRAOCULAR LENS IMPLANT;  Left Eye Phacoemulsification with Toric Lens;  Surgeon: Mary Jo Massey MD;  Location: [facility] OR     SALPINGO OOPHORECTOMY,R/L/KAYY Right 2008    Salpingo Oophorectomy, RT     SALPINGO OOPHORECTOMY,R/L/KAYY Left 07/07/2017    Laparotomy; for ovarian cancer staging     SURGICAL HISTORY OF -       facial surgery d/t fall in 1/2002     SURGICAL HISTORY OF -        1985 removal of breast cyst     SURGICAL HISTORY OF -   2008    endometrial ablation       Social History     Social History     Marital status: Single     Spouse name: N/A     Number of children: N/A     Years of education: N/A     Occupational History      Self Employed.     Social History Main Topics     Smoking status: Never Smoker     Smokeless tobacco: Never Used     Alcohol use Yes      Comment: Very infrequent, a bit of wine or beer 2x per month maybe     Drug use: Yes     Special: Marijuana      Comment: infrequent, for celebrations only, maybe 8x per year     Sexual activity: Yes     Partners: Male     Birth control/ protection: None      Comment: long-time      Other Topics Concern     Parent/Sibling W/ Cabg, Mi Or Angioplasty Before 65f 55m? No     Social History Narrative       Family History   Problem Relation Age of Onset     C.A.D. Father      DIABETES Father      Later in his life, in his 70s     Hypertension Father      CEREBROVASCULAR DISEASE Father      1984 or      Psychotic Disorder Father      Pancreatic Cancer Father      Coronary Artery Disease Father      diagnosed in his late 50s (age)     Hyperlipidemia Father      treated w statins     Other Cancer Father      pancreatic, ,  of this     Depression Father      MENTAL ILLNESS Father      likely PTSD and depression     Obesity Father      C.A.D. Mother      Hypertension Mother      Breast Cancer Mother      2 times,  and ?     Psychotic Disorder Mother      Colon Cancer Mother      ?     CANCER Mother      Bone cancer     Coronary Artery Disease Mother      diagnosed in her late 70s (age)     Hyperlipidemia Mother      treated w. statins     Other Cancer Mother      bone/marrow, ,  of this     Depression Mother      Anxiety Disorder Mother      MENTAL ILLNESS Mother      various undiagnosed types, but THERE     Obesity Mother      Prostate Problems Brother      cleared      Hypertension  Brother      Hyperlipidemia Brother      unsure if treated w statins     Depression Brother      Anxiety Disorder Brother      MENTAL ILLNESS Brother      undiagnosed but THERE     Obesity Brother      Psychotic Disorder Sister      x2     Neurologic Disorder Sister      Hypertension Sister      Hyperlipidemia Sister      treated w statins     Depression Sister      Anxiety Disorder Sister      Obesity Sister      Psychotic Disorder Brother      x2     Depression Brother      major depressive disorder and OCD     Anxiety Disorder Brother      MENTAL ILLNESS Brother      not sure of diagnoses, treated     Hypertension Brother      Hyperlipidemia Brother      Psychotic Disorder Maternal Grandmother      ?     Coronary Artery Disease Maternal Grandmother       of heart attack age 84?     Depression Maternal Grandmother      Psychotic Disorder Maternal Grandfather      ?     Psychotic Disorder Paternal Grandmother      Schizophernia     Coronary Artery Disease Paternal Grandmother       of heart attack, age 85?     Depression Paternal Grandmother      severe, but recovered     MENTAL ILLNESS Paternal Grandmother      possible bipolar or other     Psychotic Disorder Paternal Grandfather      ?     Respiratory Paternal Grandfather       of emphysema and smoking     Psychotic Disorder Sister      Other - See Comments Sister      small kidney stone      Hypertension Sister      Hyperlipidemia Sister      treated w statins     Depression Sister      Anxiety Disorder Sister      Cancer - colorectal No family hx of        Current Outpatient Prescriptions   Medication     atorvastatin (LIPITOR) 80 MG tablet     B Complex Vitamins (VITAMIN B-COMPLEX PO)     blood glucose (NO BRAND SPECIFIED) lancets standard     blood glucose monitoring (NO BRAND SPECIFIED) meter device kit     blood glucose monitoring (NO BRAND SPECIFIED) test strip     CALCIUM-MAGNESIUM-VITAMIN D PO     Cholecalciferol (VITAMIN D) 1000 UNIT capsule      escitalopram (LEXAPRO) 20 MG tablet     fluticasone (FLONASE) 50 MCG/ACT spray     lisinopril (PRINIVIL/ZESTRIL) 10 MG tablet     loratadine (CLARITIN) 10 MG tablet     LORazepam (ATIVAN) 1 MG tablet     Magnesium Hydroxide (MAGNESIA PO)     metFORMIN (GLUCOPHAGE-XR) 500 MG 24 hr tablet     Multiple Vitamins-Minerals (MULTIVITAMIN ADULT PO)     ofloxacin (OCUFLOX) 0.3 % ophthalmic solution     prednisoLONE acetate (PRED FORTE) 1 % ophthalmic susp     Probiotic Product (PRO-BIOTIC BLEND PO)     rivaroxaban ANTICOAGULANT (XARELTO) 20 MG TABS tablet     EPINEPHrine (EPIPEN/ADRENACLICK/OR ANY BX GENERIC EQUIV) 0.3 MG/0.3ML injection 2-pack     order for DME     order for DME     order for DME     order for DME     order for DME     order for DME     order for DME     traZODone (DESYREL) 50 MG tablet     No current facility-administered medications for this visit.           Allergies   Allergen Reactions     Paclitaxel Anaphylaxis and Difficulty breathing     Wasps [Hornets] Anaphylaxis     Yellow Hornet Venom [Hornet Venom] Anaphylaxis and Swelling     Yellow Jacket Venom [Venomil Honey Bee] Anaphylaxis and Swelling     No Clinical Screening - See Comments      Taxol      Sulfa Drugs Hives       Review of Systems  General: + fatigue. Denies changes in weight, weakness, appetite changes, night sweats, hot flashes, fever, chills, or difficulty sleeping  HEENT: + visual difficulty. Denies headaches, hair loss, spots or floaters, diplopia, masses, head injury, tinnitus, hearing loss, epistaxis, congestion, problems with teeth or gums, dysphonia, or dysphagia  Pulmonary: Denies cough, sputum, hemoptysis, shortness of breath, dyspnea on exertion, wheezing, or allergies  Cardiovascular: + edema. Denies chest pain, fainting, palpitations, murmurs, activity intolerance, or high blood pressure  Gastrointestinal: Denies nausea, vomiting, constipation, diarrhea, abdominal pain, bloating, heartburn, melena, hematochezia, or  "jaundice  Genitourinary: Denies dysuria, urinary urgency or frequency, hematuria, cloudy or malodorous urine, incontinence, repeat urinary tract infections, flank pain, pelvic pain, vaginal bleeding, vaginal discharge, or vaginal dryness  Sexual Function: Denies pain with intercourse, changes in libido, arousal difficulty, or changes in orgasm  Integumentary: Denies rashes, sores, changing moles, or scarring  Hematologic: Denies swollen lymph nodes, masses, easy bruising, or easy bleeding  Musculoskeletal: + arthralgias. Denies falls, back pain, myalgias, stiffness, muscle weakness or muscle cramps  Neurologic: Denies changes in memory, difficulty with walking, seizures, numbness and tingling, dizziness, or tremors  Psychiatric: + mood changes. Denies anxiety, depression, suicidal thoughts, or difficulty concentrating  Endocrine: Denies polydipsia, polyuria, temperature intolerance, or history of thyroid disease        OBJECTIVE:    Physical Exam:    /68  Pulse 100  Temp 98.3  F (36.8  C) (Oral)  Resp 16  Ht 1.6 m (5' 3\")  Wt 88 kg (194 lb)  LMP 01/01/2009  SpO2 95%  BMI 34.37 kg/m2    CONSTITUTIONAL: Alert non-toxic appearing female in no acute distress  HEAD: Normocephalic, atraumatic  EYES: PERRLA; no scleral icterus- L scleral hemorrhage  ENT: Oropharynx pink without lesions  NECK: Neck supple without palpable lymphadenopathy- swelling to L supraclavicular space, non-tender, no warmth or erythema, no discrete masses  RESPIRATORY: Lungs clear to auscultation, no increased work of breathing noted  CV: Regular rate and rhythm, S1S2, no clicks, murmurs, rubs, or gallops; bilateral lower extremities with trace edema, dorsalis pedis pulses 2+ bilaterally  GASTROINTESTINAL: Normoactive bowel sounds x4 quadrants, abdomen soft, non-distended, and non-tender to palpation without masses or organomegaly  GENITOURINARY: External genitalia and urethral meatus pink without lesions, masses, or excoriation. Vagina " pink and moist without masses or lesions. Vaginal cuff without nodularity, masses, or lesions. Cervix surgically absent. Bimanual exam reveals no masses or fullness. Rectovaginal exam confirms these findings.  LYMPHATIC: Cervical, supraclavicular, and inguinal nodes without lymphadenopathy  MUSCULOSKELETAL: Moves all extremities, no obvious muscle wasting  NEUROLOGIC: No gross deficits, normal gait  SKIN: Appropriate color for race, warm and dry, no rashes or lesions to unclothed skin  PSYCHIATRIC: Pleasant and interactive, affect bright, makes appropriate eye contact, thought process linear      Data:   11    Assessment/Plan:  1) Stage IC2 clear cell ovarian carcinoma: No signs of recurrence. Continue surveillance every three months x2 years (through 10/2019) followed by every six months x3 years (through 10/2022) then annually thereafter with  and pelvic/rectal exam. Reviewed signs and symptoms  of recurrence including but not limited to bleeding from vagina, bladder, or rectum, bloating, early satiety, swelling in the lower extremities, abdominal or lower back pain, changes in bowel or bladder patterns, shortness of breath, increased fatigue, unexplained weight loss, and night sweats. Reviewed signs and symptoms for when she should contact the clinic or seek additional care. Patient to contact the clinic with any questions or concerns in the interim.  2) Fatigue: Stable to somewhat improving, impacting her ability to work full time. Will start cancer rehab for post-treatment fatigue.  3) L supraclavicular swelling: Unclear etiology, obtain US of the neck for further investigation  4) Genetic testing: Hafsa is negative for mutations in the AIP, ALK, APC, COSTA, BAP1, BARD1, BLM, BRCA1, BRCA2, BRIP1, BMPR1A, CDH1, CDK4, CDKN1B, CDKN2A, CHEK2, DICER1, EPCAM, FANCC, FH, FLCN, GALNT12, GREM1, HOXB13, MAX, MEN1, MET, MITF, MLH1, MRE11A, MSH2, MSH6, MUTYH, NBN, NF1, NF2, PALB2, PHOX2B, PMS2, POLD1, POLE, POT1,  ACOXU9B, PTCH1, PTEN, RAD50, RAD51C, RAD51D, RB1, RET, SDHA, SDHAF2, SDHB, SDHC, SDHD, SMAD4, SMARCA4, SMARCB1, SMARCE1, STK11, SUFU, WGIQ495, TP53, TSC1, TSC2, VHL, and XRCC2 genes. No mutations were found in any of the 67 genes analyzed.   5) Labs:  11, reviewed outside CT  6) Health maintenance issues discussed today include to follow up with PCP for co-morbid conditions and non-gynecologic issues.   7) Patient verbalized understanding of and agreement with plan.    A total of 20 minutes of face to face time spent with patient, over 50% of which was spent in counseling and coordination of care.    MARLON Ogden, FNP-C  Division of Gynecologic Oncology  [facility]  Pager: [phone]

## 2018-05-25 NOTE — Clinical Note
In room. Surveillance with Augustina in three months with lab. US of neck some time within the next few weeks.

## 2018-06-01 ENCOUNTER — RADIANT APPOINTMENT (OUTPATIENT)
Dept: ULTRASOUND IMAGING | Facility: CLINIC | Age: 63
End: 2018-06-01
Attending: NURSE PRACTITIONER
Payer: COMMERCIAL

## 2018-06-01 DIAGNOSIS — R22.1 LOCALIZED SWELLING, MASS AND LUMP, NECK: ICD-10-CM

## 2018-06-05 DIAGNOSIS — H25.12 NUCLEAR SCLEROTIC CATARACT, LEFT: ICD-10-CM

## 2018-06-05 RX ORDER — PREDNISOLONE ACETATE 10 MG/ML
SUSPENSION/ DROPS OPHTHALMIC
Qty: 1 BOTTLE | Refills: 1 | Status: SHIPPED | OUTPATIENT
Start: 2018-06-05 | End: 2018-08-24

## 2018-06-13 ENCOUNTER — OFFICE VISIT (OUTPATIENT)
Dept: OPHTHALMOLOGY | Facility: CLINIC | Age: 63
End: 2018-06-13
Attending: OPHTHALMOLOGY
Payer: COMMERCIAL

## 2018-06-13 DIAGNOSIS — Z96.1 PSEUDOPHAKIA OF LEFT EYE: Primary | ICD-10-CM

## 2018-06-13 DIAGNOSIS — H25.11 NUCLEAR SCLEROTIC CATARACT, RIGHT: ICD-10-CM

## 2018-06-13 PROCEDURE — 92015 DETERMINE REFRACTIVE STATE: CPT | Mod: 52,ZF

## 2018-06-13 PROCEDURE — G0463 HOSPITAL OUTPT CLINIC VISIT: HCPCS | Mod: ZF

## 2018-06-13 ASSESSMENT — VISUAL ACUITY
OS_PH_SC: 20/25
OS_SC: 20/40
OD_SC: 20/80
METHOD: SNELLEN - LINEAR
OS_SC+: +2
OD_PH_SC: 20/30+2
OD_SC+: +1

## 2018-06-13 ASSESSMENT — REFRACTION_MANIFEST
OS_AXIS: 155
OS_SPHERE: -1.25
OS_CYLINDER: +1.25
OS_ADD: +2.50

## 2018-06-13 ASSESSMENT — CONF VISUAL FIELD
OS_NORMAL: 1
OD_NORMAL: 1
METHOD: COUNTING FINGERS

## 2018-06-13 ASSESSMENT — EXTERNAL EXAM - LEFT EYE: OS_EXAM: NORMAL

## 2018-06-13 ASSESSMENT — CUP TO DISC RATIO
OD_RATIO: 0.3
OS_RATIO: 0.3

## 2018-06-13 ASSESSMENT — SLIT LAMP EXAM - LIDS
COMMENTS: NORMAL
COMMENTS: NORMAL

## 2018-06-13 ASSESSMENT — TONOMETRY
IOP_METHOD: TONOPEN
OD_IOP_MMHG: 12
OS_IOP_MMHG: 11

## 2018-06-13 NOTE — PROGRESS NOTES
HPI  Hafsa Corona is a 63 year old female here for cataract evaluation. She states her left eye vision has continued to get worse. She can no longer see much at all out of the left eye.    POH: No history of eye trauma  PMH: S/p kidney surgery with one functional kidney, diabetes.    Assessment & Plan      (Z96.1) Pseudophakia of left eye  (primary encounter diagnosis)  Comment: Good post-op appearance, spherical equivalent close to plano  Plan: Monitor, continue drops taper     (H25.13) Nuclear sclerotic cataract of left eye  (primary encounter diagnosis)  Comment: Visually significant OD      (H52.203) Hyperopic astigmatism of both eyes/(H52.4) Presbyopia  Comment: Vision limited by above  Plan: Observe    -----------------------------------------------------------------------------------    Patient disposition:   Data Unavailable or sooner as needed.    Nakul Espinoza M.D.  PGY-2, Ophthalmology

## 2018-06-13 NOTE — NURSING NOTE
Chief Complaints and History of Present Illnesses   Patient presents with     Follow Up For     4 week follow up s/p CE IOL Left Eye     HPI    Affected eye(s):  Left   Symptoms:     No floaters   No flashes   No redness   No tearing   No Dryness         Do you have eye pain now?:  No      Comments:  Pt states vision has been good since surgery. Occasional irritation in LE that comes and goes, relief with blinking.  DM2 BS: Pt doesn't check sugars daily.  Lab Results       Component                Value               Date                       A1C                      5.6                 05/10/2018                 A1C                      6.1                 08/31/2017                 A1C                      6.0                 04/11/2017                 A1C                      6.2                 09/06/2016                 A1C                      6.0                 02/04/2016                Star URBINA June 13, 2018 10:53 AM

## 2018-06-13 NOTE — PROGRESS NOTES
HPI  Hafsa Corona is a 63 year old female here for follow-up after CE/IOL left eye. She feels the vision is clear and the eye comfortable. She is interested in having the right eye done.     POH: No history of eye trauma  PMH: S/p kidney surgery with one functional kidney, diabetes.    Assessment & Plan    (Z96.1) Pseudophakia of left eye  (primary encounter diagnosis)  Comment: Good post-op appearance.  Plan: Complete drop taper. Hold off on new Rx until after right eye surgery    (H25.11) Nuclear sclerotic cataract, right  Comment: Visually significant with BCVA of 20/25, NS and PSC on exam    Dilates to: 7 mm  Alpha blockers/Flomax: None  Trauma/Pseudoxfoliation: None  Fuchs dystrophy/guttae: None    Diabetes: YES, no visible retinopathy  Anticoagulation: None    Cyl: 0.64 @ 104 OD,    We discussed the risks and benefits of cataract surgery in the right eye, and informed consent was obtained.  Proceed with CE/IOL OD.    Surgical plan:  Retrobulbar/MAC  Aim emmetropia    -----------------------------------------------------------------------------------    Patient disposition:   Return for scheduled procedure. or sooner as needed.    Teaching statement:  Complete documentation of historical and exam elements from today's encounter can be found in the full encounter summary report (not reduplicated in this progress note). I personally obtained the chief complaint(s) and history of present illness.  I confirmed and edited as necessary the review of systems, past medical/surgical history, family history, social history, and examination findings as documented by others; and I examined the patient myself. I personally reviewed the relevant tests, images, and reports as documented above.     I formulated and edited as necessary the assessment and plan and discussed the findings and management plan with the patient and family.    Mary Jo Massey MD  Comprehensive Ophthalmology & Ocular Pathology  Department of  Ophthalmology and Visual Neurosciences  [email or web address]  Pager [phone]

## 2018-06-13 NOTE — MR AVS SNAPSHOT
After Visit Summary   6/13/2018    Hafsa Corona    MRN: [id number]           Patient Information     Date Of Birth          1955        Visit Information        Provider Department      6/13/2018 10:30 AM Mary Jo Massey MD [facility]        Today's Diagnoses     Pseudophakia of left eye    -  1    Nuclear sclerotic cataract, right           Follow-ups after your visit        Follow-up notes from your care team     Return for scheduled procedure.      Your next 10 appointments already scheduled     Jun 14, 2018 12:30 PM CDT   Cancer Rehab Eval with Leena Albright, PT   [facility] [place], Physical Therapy - Outpatient ([facility])    [address]  [place] [address]   [phone]            Adam 15, 2018  1:00 PM CDT   Cancer Rehab Eval with Chelsi Turner, OT   [facility] [place], Occupational Therapy - Outpatient ([facility])    [address]  [place] [address]   [phone]            Jul 13, 2018 12:15 PM CDT   (Arrive by 12:00 PM)   Post-Op with Mary Jo Massey MD   [facility] ([facility])    [address] [place] [address]  4th [facility] [address] [phone]            Aug 06, 2018 10:30 AM CDT   Post-Op with Mary Jo Massey MD   [facility] ([facility])    [address]  [address] [address]  [facility] [address]   [phone]            Aug 24, 2018  2:00 PM CDT   (Arrive by 1:45 PM)   Return Visit with MARLON Ogden [facility] ([facility])    [address]  [address]  [facility] [address] [phone]            Sep 21, 2018 12:00 PM CDT   (Arrive by 11:45 AM)   Return Visit with MARLON Ch[facility] Cancer [facility] ([facility])    [address] [place] [address]  [address]  [address]  [facility] [id number] [phone]            Sep 21, 2018 12:30 PM CDT   MA SCREENING BILATERAL W/ THAIS with [email or web address]   [facility] ([facility])    [address]  [address]  [facility] [id number] [phone]           Three-dimensional (3D) mammograms are available at [facility] locations in [facility], [place], [place], [facility], [place], [place], and [place]. [facility] locations include [place] and [facility] in [place]. Benefits of 3D mammograms include: - Improved rate of cancer detection - Decreases your chance of having to go back for more tests, which means fewer: - \"False-positive\" results (This means that there is an abnormal area but it isn't cancer.) - Invasive testing procedures, such as a biopsy or surgery - Can provide clearer images of the breast if you have dense breast tissue. 3D mammography is an optional exam that anyone can have with a 2D mammogram. It doesn't replace or take the place of a 2D mammogram. 2D mammograms remain an effective screening test for all women.  Not all insurance companies cover the cost of a 3D mammogram. Check with your insurance.              Who to contact     Please call your clinic at [phone] to:    Ask questions about your health    Make or cancel appointments    Discuss your medicines    Learn about your test results    Speak to your doctor            Additional Information About Your Visit        aka-aki networkshart Information     [employer] gives you secure access to your electronic health record. If you see a primary care provider, you can also send messages to your care team and make appointments. If you have questions, please call your primary care clinic.  If you do not have a primary care provider, please call [phone] and they will assist you.      [employer] is an electronic gateway that provides easy, online access to your medical records. With " Arthur, you can request a clinic appointment, read your test results, renew a prescription or communicate with your care team.     To access your existing account, please contact your [facility] or call [phone] for assistance.        Care EveryWhere ID     This is your Care EveryWhere ID. This could be used by other organizations to access your [facility] medical records  [id number]        Your Vitals Were     Last Period                   01/01/2009            Blood Pressure from Last 3 Encounters:   05/25/18 101/68   05/18/18 113/66   05/10/18 111/74    Weight from Last 3 Encounters:   05/25/18 88 kg (194 lb)   05/18/18 86.2 kg (190 lb)   05/10/18 88 kg (194 lb)              We Performed the Following     [facility]ania-Operative Worksheet        Primary Care Provider Office Phone # Fax #    Morelia Ayon [phone]       [address] [facility] [address]        Equal Access to Services     RONEY ARANDA : Hadii aad ku hadasho Soomaali, waaxda luqadaha, qaybta kaalmada adeegyada, waxay idiin hayaan adeeg kharash la'cathleenn . So [facility] [phone].    ATENCIÓN: Si habla español, tiene a martínez disposición servicios gratuitos de asistencia lingüística. Llame al [phone].    We comply with applicable federal civil rights laws and [place] laws. We do not discriminate on the basis of race, color, national origin, age, disability, sex, sexual orientation, or gender identity.            Thank you!     Thank you for choosing [facility]  for your care. Our goal is always to provide you with excellent care. Hearing back from our patients is one way we can continue to improve our services. Please take a few minutes to complete the written survey that you may receive in the mail after your visit with us. Thank you!             Your Updated Medication List - Protect others around you: Learn how to safely use, store and throw away your medicines at www.disposemymeds.org.           This list is accurate as of 6/13/18 12:14 PM.  Always use your most recent med list.                   Brand Name Dispense Instructions for use Diagnosis    atorvastatin 80 MG tablet    LIPITOR    90 tablet    TAKE 1 TABLET(80 MG) BY MOUTH DAILY    Hyperlipidemia LDL goal <100       blood glucose lancets standard    no brand specified    100 each    Use to test blood sugar 2 times daily or as directed.    Type 2 diabetes mellitus without complication, without long-term current use of insulin (H)       blood glucose monitoring meter device kit    no brand specified    1 kit    Use to test blood sugar 2 times daily or as directed.    Type 2 diabetes mellitus without complication, without long-term current use of insulin (H)       blood glucose monitoring test strip    no brand specified    100 strip    Use to test blood sugars 2 times daily or as directed    Type 2 diabetes mellitus without complication, without long-term current use of insulin (H)       CALCIUM-MAGNESIUM-VITAMIN D PO      Take 2 tablets by mouth daily        EPINEPHrine 0.3 MG/0.3ML injection 2-pack    EPIPEN/ADRENACLICK/or ANY BX GENERIC EQUIV    0.3 mL    Inject 0.3 mLs (0.3 mg) into the muscle once as needed for anaphylaxis    Bee allergy status       escitalopram 20 MG tablet    LEXAPRO    30 tablet    Take 1 tablet (20 mg) by mouth daily    Major depressive disorder, recurrent episode, mild (H)       fluticasone 50 MCG/ACT spray    FLONASE    48 mL    SHAKE WELL AND USE 2 SPRAYS IN EACH NOSTRIL DAILY    Chronic maxillary sinusitis       lisinopril 10 MG tablet    PRINIVIL/ZESTRIL    90 tablet    Take 1 tablet (10 mg) by mouth daily    Essential hypertension       loratadine 10 MG tablet    CLARITIN    30 tablet    Take 1 tablet (10 mg) by mouth daily    Pain in joint, multiple sites       LORazepam 1 MG tablet    ATIVAN    30 tablet    Take 1 tablet (1 mg) by mouth every 6 hours as needed (nausea/vomiting, anxiety or sleep)    Ovarian cancer,  "left (H), Encounter for long-term (current) use of medications       MAGNESIA PO      Take 500 mg by mouth        metFORMIN 500 MG 24 hr tablet    GLUCOPHAGE-XR    180 tablet    TAKE 2 TABLETS BY MOUTH ONCE DAILY WITH EVENING MEAL    Type 2 diabetes mellitus without complication, without long-term current use of insulin (H)       MULTIVITAMIN ADULT PO      Take 1 tablet by mouth daily        ofloxacin 0.3 % ophthalmic solution    OCUFLOX    1 Bottle    1 drop 4x daily in the surgical eye for 1 week after surgery, then stop    Nuclear sclerotic cataract, left       order for DME     1 Units    Equipment being ordered: blood pressure monitor    Essential hypertension       * order for DME     1 each    Compression stockings 20-30 mm Hg. To be wear when directed by Hematology team and while awake for the first two years post clot.    Long-term (current) use of anticoagulants, Acute deep vein thrombosis (DVT) of left lower extremity, unspecified vein (H)       * order for DME     1 Bottle    Plain packing strip 1/4\"    Abscess of leg       * order for DME     1 Box    Sterile zoey tipped applicators    Abscess of leg       * order for DME     3 Bottle    USE 1/4 INCH  WIDTH PLAIN NU GAUZE PACKING TO DO SELF WOUND CARE OF LEFT LOWER LEG ONCE DAILY. DIMENSIONS OF WOUND PRESENTLY ARE 2 X 2 INCH AND APPROXIMATELY 1/2 INCH DEEP.  USES 6 INCHES OF PACKING CURRENTLY FOR DRESSING CHANGE.  FAX TO [facility] [phone]    Wound of left leg       * order for DME     30 pad    STERILE ADHESIVE PAD BANDAGE AT LEAST 4X4 INCH IN SIZE NEEDED FOR DAILY WOUND CARE TO LEFT LOWER LEG. FAX TO [facility] [phone]    Leg wound, left       * order for DME     1 Units    Home blood pressure monitor    Type 2 diabetes mellitus without complication, without long-term current use of insulin (H)       prednisoLONE acetate 1 % ophthalmic susp    PRED FORTE    1 Bottle    1 drop in surgical eye as directed, 4x daily after surgery " for 1 week, 3x daily for 1 week, 2x daily for 1 week, daily for 1 week, then stop    Nuclear sclerotic cataract, left       PRO-BIOTIC BLEND PO      Take 1 capsule by mouth daily Enzymatic Therapy-probiotic pearls        rivaroxaban ANTICOAGULANT 20 MG Tabs tablet    XARELTO    30 tablet    Take 1 tablet (20 mg) by mouth daily (with dinner)    Long-term (current) use of anticoagulants, Acute deep vein thrombosis (DVT) of left lower extremity, unspecified vein (H)       traZODone 50 MG tablet    DESYREL    180 tablet    TAKE 1 TO 2 TABLETS(50  MG) BY MOUTH EVERY NIGHT AS NEEDED FOR SLEEP    Insomnia, unspecified type       VITAMIN B-COMPLEX PO           vitamin D 1000 units capsule      Take 2,000 Units by mouth daily        * Notice:  This list has 6 medication(s) that are the same as other medications prescribed for you. Read the directions carefully, and ask your doctor or other care provider to review them with you.

## 2018-06-14 ENCOUNTER — HOSPITAL ENCOUNTER (OUTPATIENT)
Dept: PHYSICAL THERAPY | Facility: CLINIC | Age: 63
Setting detail: THERAPIES SERIES
End: 2018-06-14
Attending: NURSE PRACTITIONER
Payer: COMMERCIAL

## 2018-06-14 PROCEDURE — 97535 SELF CARE MNGMENT TRAINING: CPT | Mod: GP | Performed by: PHYSICAL THERAPIST

## 2018-06-14 PROCEDURE — 97161 PT EVAL LOW COMPLEX 20 MIN: CPT | Mod: GP | Performed by: PHYSICAL THERAPIST

## 2018-06-14 PROCEDURE — 97110 THERAPEUTIC EXERCISES: CPT | Mod: GP | Performed by: PHYSICAL THERAPIST

## 2018-06-14 PROCEDURE — 40000360 ZZHC STATISTIC PT CANCER REHAB VISIT: Performed by: PHYSICAL THERAPIST

## 2018-06-14 ASSESSMENT — 6 MINUTE WALK TEST (6MWT)
TOTAL DISTANCE WALKED (METERS): 441.7
TOTAL DISTANCE WALKED (FT): 1446

## 2018-06-14 NOTE — DISCHARGE INSTRUCTIONS
6/14/18   - Walking program: walk around the block by yourself. Then you can get your dogs if you want to. Try to time the walk to see how long it takes you. 10 minutes max right now. GOAL: 3 days/ week.   -

## 2018-06-14 NOTE — PROGRESS NOTES
06/14/18 1200   Quick Adds   Type of Visit Initial OP PT Evaluation   General Information   Start of Care Date 06/14/18   Referring Physician Augustina Noriega APRN CNP    Orders Evaluate and Treat as Indicated   Order Date 05/25/18   Medical Diagnosis other fatigue    Onset of illness/injury or Date of Surgery 05/25/18   Precautions/Limitations no known precautions/limitations   Surgical/Medical history reviewed Yes   Pertinent history of current problem (include personal factors and/or comorbidities that impact the POC) Been working part time and this has made me more tired. Cancer diagnosed on 7/3/17. Hx of hysterectomy. 3 cycles of chemo on 10/17/17. Chemo ended at end of Novemeber. Post-chemo went well. CT scan found a mass on my right kidney. It is a benign mass. Jan 24th 2018, R kidney was removed. Early April went back to work part time 20-24 hours per week. Occasionally walk the dog, but no real exercising. Feeling of being overwhelmed, ADHD. I fall behind on daily tasks. Part because of fatigue, it is worsening it.    Pertinent Visual History  Catract surgery L eye improved - great. Will have surgery in July on the R eye    Prior level of function comment independent    Previous/Current Treatment Physical Therapy   Improvement after PT (Broken foot and walking issues )   Current Community Support Family/friend caregiver  (boyfriend and friends )   Patient role/Employment history Employed  ()   Living environment [place]  (5-6 steps goes okay )   Patient/Family Goals Statement To feel more energy and less fatigue, drop some weight, healthier life    General Information Comments Difficult getting in/out of cars - when seat is too low or too high    Fall Risk Screen   Fall screen completed by PT   Have you fallen 2 or more times in the past year? Yes   Have you fallen and had an injury in the past year? No   Timed Up and Go score (seconds) (Will test next session )   Is patient  a fall risk? Department fall risk interventions implemented   Fall screen comments two falls - fell in the snow, during chemo and I was weak    System Outcome Measures   Outcome Measures Cancer Rehab   FACIT Fatigue Subscale (score out of 52). The higher the score, the better the QOL. 22   Six Minute Walk (meters). An increase of 70 or more meters indicates statistically significant change. 441.7   Pain   Patient currently in pain Yes   Pain comments Sometimes knee and leg pain.    Cognitive Status Examination   Orientation orientation to person, place and time   Level of Consciousness alert   Follows Commands and Answers Questions 100% of the time   Personal Safety and Judgment intact   Memory impaired   Cognitive Comment Memory is pretty good, but when I'm very tired I cant remember names. A little foggy sometimes. More so after chemo.   Integumentary   Integumentary Comments Slight inc in swelling of LLE    Posture   Posture Normal   Range of Motion (ROM)   ROM Quick Adds no deficits were identified   Strength   Strength Comments Bilateral hip weakness L/R hip flexion: 4/5 - 5/5 all other Le's    Bed Mobility   Bed Mobility Comments independent    Transfer Skills   Transfer Comments independent    Gait   Gait Comments Pt ambulates without assistive device, adequate gait speed, foot clearance and step symmetry    Gait Special Tests   Gait Special Tests SIX MINUTE WALK TEST   Gait Special Tests Six Minute Walk Test   Feet 1446 Feet   Comments pt reports she was walking slightly faster than usual pace. Fatigued after, but felt like she actually had some more energy a little bit.    Balance Special Tests   Balance Special Tests Modified CTSIB Conditions   Balance Special Tests Modified CTSIB Conditions   Condition 1, seconds 30 Seconds   Condition 2, seconds 30 Seconds   Condition 4, seconds 30 Seconds   Condition 5, seconds 30 Seconds   Modified CTSIB Comments inc sway on conditon 5    Sensory Examination   Sensory  Perception Comments B neuropathy - return of tingling in L toes. within the last week    Planned Therapy Interventions   Planned Therapy Interventions balance training;gait training;neuromuscular re-education;ROM;strengthening;stretching;manual therapy   Clinical Impression   Criteria for Skilled Therapeutic Interventions Met yes, treatment indicated   PT Diagnosis Fatigue/decondiiton s/p ovarian cancer    Influenced by the following impairments significant fatigue, weakness, pain, decreased motivation, anxiety/depression    Functional limitations due to impairments gait, work, community activites (has not gone to Buddhism)   Clinical Presentation Evolving/Changing   Clinical Presentation Rationale increased fatigue recently, decreased motivation, difficulty working due to fatigue    Clinical Decision Making (Complexity) Low complexity   Therapy Frequency 1 time/week   Predicted Duration of Therapy Intervention (days/wks) Up to 90 days    Risk & Benefits of therapy have been explained Yes   Patient, Family & other staff in agreement with plan of care Yes   Clinical Impression Comments Pt will benefit from PT services in order to improve fatigue related to recent cancer, strength and balance.    Education Assessment   Preferred Learning Style Listening;Demonstration   Barriers to Learning Cognitive  (depression )   GOALS   PT Eval Goals 1;2;3;4   Goal 1   Goal Identifier FACIT    Goal Description Pt to improve score on facit to 40/52 in order to demonstrate reduced fatigue for improvement with daily activities    Target Date 09/11/18   Goal 2   Goal Identifier 6MWT    Goal Description Pt will improve score on 6MWT by at least 70 meters (at least 511 meters or greater) in order to significantly improve activity tolerance    Target Date 09/11/18   Goal 3   Goal Identifier Fatigue management    Goal Description Pt will demonstrate/verbalize 3-5 fatigue management strategies in order to complete all adl's and begin to  go to the synogogue again    Target Date 09/11/18   Goal 4   Goal Identifier HEP    Goal Description Pt will be independent in HEP for activity tolerance, balance, strength in order to return to prior level of function   Target Date 09/11/18   Total Evaluation Time   Total Evaluation Time (Minutes) 37

## 2018-06-15 ENCOUNTER — HOSPITAL ENCOUNTER (OUTPATIENT)
Dept: OCCUPATIONAL THERAPY | Facility: CLINIC | Age: 63
Setting detail: THERAPIES SERIES
End: 2018-06-15
Attending: NURSE PRACTITIONER
Payer: COMMERCIAL

## 2018-06-15 PROCEDURE — 40000361 ZZHC STATISTIC OT CANCER REHAB VISIT: Performed by: OCCUPATIONAL THERAPIST

## 2018-06-15 PROCEDURE — 97165 OT EVAL LOW COMPLEX 30 MIN: CPT | Mod: GO | Performed by: OCCUPATIONAL THERAPIST

## 2018-06-15 ASSESSMENT — ACTIVITIES OF DAILY LIVING (ADL)
IADL_QUICK_ADDS: MEAL PLANNING/PREPARATION;HOME/FINANCIAL/MANAGEMENT;COMMUNICATION/COMPUTER USE;COMMUNITY MOBILITY;CARE OF OTHERS

## 2018-06-15 NOTE — PROGRESS NOTES
06/15/18 1300   Quick Adds   Type of Visit Initial Outpatient Occupational Therapy Evaluation   General Information   Start Of Care Date 06/15/18   Referring Physician Augustina Glez   Orders Evaluate and treat as indicated   Other Orders Cancer rehab   Orders Date 05/25/18   Medical Diagnosis Cancer with hysterectomy   Onset of Illness/Injury or Date of Surgery 07/03/18   Surgical/Medical History Reviewed Yes   Additional Occupational Profile Info/Pertinent History of Current Problem 63 year old female with cancer diagnosed on 7/3/17. Hx of hysterectomy. 3 cycles of chemo on 10/17/17. Chemo ended at end of Novemeber. Post-chemo went well. CT scan found a mass on my right kidney. It is a benign mass. 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  She had her own home for 20 years that she had to move out of in a crisis-financial and mental health- and now had lots of clutter in her home and difficulties managing her household.   Patient/family Goals Statement Manage household, to cook more and have people over   Pain   Pain comments occational knee and leg pain   Fall Risk Screen   Fall screen completed by PT   Have you fallen 2 or more times in the past year? Yes   Have you fallen and had an  "injury in the past year? No   Is patient a fall risk? Department fall risk interventions implemented   Fall screen comments two falls - fell in the snow, during chemo and I was weak    System Outcome Measures   FACIT Fatigue Subscale (score out of 52). The higher the score, the better the QOL. 22   Cognitive Status Examination   Cognitive Comment MOCA: 29/30- 4/5 on delayed recal with reports of challenges with STM   Visual Perception   Visual Perception Comments L cataract surgery R one to be done next month, loo   Sensation   Sensation Comments L toes neuropaththy   Posture   Posture Not impaired   Range of Motion (ROM)   ROM Quick Adds No deficits identified   Strength   Strength No deficits were identified   Hand Strength   Hand Dominance Right   Hand Strength Comments reports challenges with strength and coordination post chemo but now has return to baseline   Muscle Tone   Muscle Tone No deficits were identified   Balance   Balance Quick Add No deficits were identified   Functional Mobility   Ambulation No AD today   Transfer Skill   Level of [place]: Transfers independent   Bathing   Level of [place] - Bathing independent   Upper Body Dressing   Level of [place]: Dress Upper Body independent   Lower Body Dressing   Level of [place]: Dress Lower Body independent   Toileting   Level of [place]: Toilet independent   Grooming   Level of [place]: Grooming independent   Eating/Self-Feeding   Level of [place]: Eating independent   Activity Tolerance   Activity Tolerance All ADL's and IADL are more more difficult due to current fatigued mixed with prior mental health defciits of ADHD, anxiety, Depression and \"hoarding\" tendancies alongwith financial challenges   Instrumental Activities of Daily Living Assessment   IADL Assessment/Observations \"administrational\" organization defcits   IADL Quick Adds Meal Planning/Preparation;Home/Financial/Management;Communication/Computer " Use;Community Mobility;Care of Others   Meal Planning/Preparation Energy and finances limit    Home/Financial Management Limited finances, has a finanical  and behind on bills   Community Mobility could not drive during chemo but now things are going well   Care of Others 3 animals   Planned Therapy Interventions   Planned Therapy Interventions ADL training;IADL training;Cognitive skills;ROM;Self care/Home management;Strengthening;Stretching;Therapeutic activities   Adult OT Eval Goals   OT Eval Goals (Adult) 1;2   OT Goal 1   Goal Identifier Fatigue mgmt   Goal Description Patient will identify at least 3 methods to manage her fatigue/energy to demonstrate at least a 5 point improvement in her perceived level of fatigue as measured by the FACIT   Target Date 08/14/18   OT Goal 2   Goal Identifier Organizational problem solving   Goal Description Patient will independently demonstrate improved planning and problem solving with the use of mapping to breakdown and then sequence tasks with use of weekly planning tool to manage cognitive fatigue.    Target Date 08/14/18   Clinical Impression   Criteria for Skilled Therapeutic Interventions Met Yes, treatment indicated   OT Diagnosis impaired iadl participation   Influenced by the following impairments fatigue, impaired exectutive fucntion   Assessment of Occupational Performance 1-3 Performance Deficits   Identified Performance Deficits patient reports challenges with completion of daily ADL's, full time work, and cooking/shoping   Clinical Decision Making (Complexity) Low complexity   Therapy Frequency once a week   Predicted Duration of Therapy Intervention (days/wks) up to 10 sessions in 60 days   Patient, Family & other staff in agreement with plan of care Yes   Clinical Impression Comments 63 year old female in need of skilled Ot to assist with faituge mgmt and congitive defciits in order for return to [facility].   Education Assessment   Barriers To Learning No  Barriers   Preferred Learning Style Listening;Demonstration   Total Evaluation Time   Total Evaluation Time 60

## 2018-06-29 ENCOUNTER — HOSPITAL ENCOUNTER (OUTPATIENT)
Dept: PHYSICAL THERAPY | Facility: CLINIC | Age: 63
Setting detail: THERAPIES SERIES
End: 2018-06-29
Attending: NURSE PRACTITIONER
Payer: COMMERCIAL

## 2018-06-29 PROCEDURE — 40000360 ZZHC STATISTIC PT CANCER REHAB VISIT: Performed by: PHYSICAL THERAPIST

## 2018-06-29 PROCEDURE — 97110 THERAPEUTIC EXERCISES: CPT | Mod: GP | Performed by: PHYSICAL THERAPIST

## 2018-06-29 NOTE — IP AVS SNAPSHOT
MRN:[id number]                      After Visit Summary   6/29/2018    Hafsa Corona    MRN: [id number]           Visit Information        Provider Department      6/29/2018  1:45 PM Lina Cheung, PT [facility], [place], Physical Therapy - Outpatient        Your next 10 appointments already scheduled     Jul 05, 2018  1:00 PM CDT   Cancer Rehab Treatment with Lina Cheung, PT   [facility], [place], Physical Therapy - Outpatient ([facility])    [address], [address]  [place] [address]   [phone]            Jul 05, 2018  3:00 PM CDT   Cancer Rehab Treatment with Chelsi Turner, OT   [facility], [place], Occupational Therapy - Outpatient ([facility])    [address], [address]  [place] [address]   [phone]            Jul 09, 2018 12:45 PM CDT   Pre-Op physical with Morelia Ayon NP   [facility] ([facility])    [address] [place] [address]   [phone]            Jul 10, 2018 11:00 AM CDT   Cancer Rehab Treatment with Chelsi Turner, OT   [facility], [place], Occupational Therapy - Outpatient ([facility])    [address], [address]  [place] [address]   [phone]            Jul 12, 2018  1:15 PM CDT   Cancer Rehab Treatment with Leena Albright, PT   [facility], [place], Physical Therapy - Outpatient ([facility])    [address], [address]  [place] [address]   [phone]            Jul 13, 2018   Procedure with Mary Jo Massey MD   [facility] ([facility])    [address] [place] [address]  [address] [facility] [address] [phone]           Located in the Clinics and Surgery Center at [address] [place], [place] [id number].   parking is very convenient and highly  recommended.  is a $6 flat rate fee.  Both  and self parkers should enter the main arrival plaza from [facility]; parking attendants will direct you based on your parking preference.            Jul 13, 2018 12:15 PM CDT   (Arrive by 12:00 PM)   Post-Op with Mary Jo Massey MD   [facility] ([facility])    [address]  4th Floor  [facility] [address]   [phone]            Jul 19, 2018  1:00 PM CDT   Cancer Rehab Treatment with Lina Cheung, PT   [facility], [place], Physical Therapy - Outpatient ([facility])    [address]  [place] [address]   [phone]            Jul 19, 2018  1:45 PM CDT   Cancer Rehab Treatment with Chelsi Turner, OT   [facility], [place], Occupational Therapy - Outpatient ([facility])    [address], [address]  [place] [address]   [phone]            Jul 26, 2018  1:00 PM CDT   Cancer Rehab Treatment with Chelsi Turner, OT   [facility], [place], Occupational Therapy - Outpatient ([facility])    [address], [address]  [place] [address]   [phone]                Further instructions from your care team       6/29/18    Check into [employer] or does [employer] have gym membership assistance.    NUSTEP is what you used in PT.  Seat 8 arms 10 and workload 3.  10 minutes.    RUN n FUN shoes on [address] on south side.  Take my card for discount.      WHAT about the [facility]???     $30 a YEAR.  Check out [employer].         Think about dog walk back to back - 10 min each.  On good weather days.       Lina  PT  [phone]      MyChart Information     [employer] gives you secure access to your electronic health record. If you see a primary care provider, you can also send messages to your care team and make appointments. If you  have questions, please call your primary care clinic.  If you do not have a primary care provider, please call [phone] and they will assist you.        Care EveryWhere ID     This is your Care EveryWhere ID. This could be used by other organizations to access your [place] medical records  [id number]        Equal Access to Services     RONEY ARANDA : Ward Leach, yessica leigh, sujatha chaidez. So [facility] [phone].    ATENCIÓN: Si habla español, tiene a martínez disposición servicios gratuitos de asistencia lingüística. Llame al [phone].    We comply with applicable federal civil rights laws and [place] laws. We do not discriminate on the basis of race, color, national origin, age, disability, sex, sexual orientation, or gender identity.

## 2018-06-29 NOTE — DISCHARGE INSTRUCTIONS
6/29/18    Check into [facility] or does health [employer] have gym membership assistance.    NUSTEP is what you used in PT.  Seat 8 arms 10 and workload 3.  10 minutes.    RUN n FUN shoes on [address] on [address] side.  Take my card for discount.      WHAT about the [facility]???     $30 a YEAR.  Check out [employer] [employer].         Think about dog walk back to back - 10 min each.  On good weather days.       Lina  PT  [phone]

## 2018-07-05 ENCOUNTER — HOSPITAL ENCOUNTER (OUTPATIENT)
Dept: OCCUPATIONAL THERAPY | Facility: CLINIC | Age: 63
Setting detail: THERAPIES SERIES
End: 2018-07-05
Attending: NURSE PRACTITIONER
Payer: COMMERCIAL

## 2018-07-05 ENCOUNTER — HOSPITAL ENCOUNTER (OUTPATIENT)
Dept: PHYSICAL THERAPY | Facility: CLINIC | Age: 63
Setting detail: THERAPIES SERIES
End: 2018-07-05
Attending: NURSE PRACTITIONER
Payer: COMMERCIAL

## 2018-07-05 PROCEDURE — 40000360 ZZHC STATISTIC PT CANCER REHAB VISIT: Performed by: PHYSICAL THERAPIST

## 2018-07-05 PROCEDURE — 97535 SELF CARE MNGMENT TRAINING: CPT | Mod: GO | Performed by: OCCUPATIONAL THERAPIST

## 2018-07-05 PROCEDURE — 40000361 ZZHC STATISTIC OT CANCER REHAB VISIT: Performed by: OCCUPATIONAL THERAPIST

## 2018-07-05 PROCEDURE — 97110 THERAPEUTIC EXERCISES: CPT | Mod: GP | Performed by: PHYSICAL THERAPIST

## 2018-07-06 PROCEDURE — 25000128 H RX IP 250 OP 636: Performed by: CLINICAL NURSE SPECIALIST

## 2018-07-06 PROCEDURE — 96523 IRRIG DRUG DELIVERY DEVICE: CPT

## 2018-07-06 RX ORDER — HEPARIN SODIUM (PORCINE) LOCK FLUSH IV SOLN 100 UNIT/ML 100 UNIT/ML
5 SOLUTION INTRAVENOUS ONCE
Status: COMPLETED | OUTPATIENT
Start: 2018-07-06 | End: 2018-07-06

## 2018-07-06 RX ADMIN — SODIUM CHLORIDE, PRESERVATIVE FREE 5 ML: 5 INJECTION INTRAVENOUS at 11:58

## 2018-07-06 NOTE — NURSING NOTE
Chief Complaint   Patient presents with     Port Flush     Port flushed by RN     LMP 01/01/2009    Port accessed by RN. Line flushed with NS & Heparin. Pt tolerated well.     Brenna Silva

## 2018-07-09 ENCOUNTER — MYC MEDICAL ADVICE (OUTPATIENT)
Dept: FAMILY MEDICINE | Facility: CLINIC | Age: 63
End: 2018-07-09

## 2018-07-09 ENCOUNTER — OFFICE VISIT (OUTPATIENT)
Dept: FAMILY MEDICINE | Facility: CLINIC | Age: 63
End: 2018-07-09
Payer: COMMERCIAL

## 2018-07-09 VITALS
RESPIRATION RATE: 18 BRPM | TEMPERATURE: 98 F | OXYGEN SATURATION: 97 % | SYSTOLIC BLOOD PRESSURE: 114 MMHG | DIASTOLIC BLOOD PRESSURE: 75 MMHG | BODY MASS INDEX: 35.32 KG/M2 | WEIGHT: 199.38 LBS | HEART RATE: 84 BPM

## 2018-07-09 DIAGNOSIS — E11.9 TYPE 2 DIABETES MELLITUS WITHOUT COMPLICATION, WITHOUT LONG-TERM CURRENT USE OF INSULIN (H): ICD-10-CM

## 2018-07-09 DIAGNOSIS — Z79.01 LONG-TERM (CURRENT) USE OF ANTICOAGULANTS: ICD-10-CM

## 2018-07-09 DIAGNOSIS — Z90.5 SOLITARY KIDNEY, ACQUIRED: ICD-10-CM

## 2018-07-09 DIAGNOSIS — Z01.818 PREOP GENERAL PHYSICAL EXAM: Primary | ICD-10-CM

## 2018-07-09 DIAGNOSIS — I10 ESSENTIAL HYPERTENSION: ICD-10-CM

## 2018-07-09 LAB — HGB BLD-MCNC: 11.5 G/DL (ref 11.7–15.7)

## 2018-07-09 PROCEDURE — 80048 BASIC METABOLIC PNL TOTAL CA: CPT | Performed by: NURSE PRACTITIONER

## 2018-07-09 PROCEDURE — 82043 UR ALBUMIN QUANTITATIVE: CPT | Performed by: NURSE PRACTITIONER

## 2018-07-09 PROCEDURE — 99215 OFFICE O/P EST HI 40 MIN: CPT | Performed by: NURSE PRACTITIONER

## 2018-07-09 PROCEDURE — 36415 COLL VENOUS BLD VENIPUNCTURE: CPT | Performed by: NURSE PRACTITIONER

## 2018-07-09 PROCEDURE — 85018 HEMOGLOBIN: CPT | Performed by: NURSE PRACTITIONER

## 2018-07-09 NOTE — MR AVS SNAPSHOT
After Visit Summary   7/9/2018    Hafsa Corona    MRN: [id number]           Patient Information     Date Of Birth          1955        Visit Information        Provider Department      7/9/2018 12:45 PM Morelia Ayon NP [facility]        Today's Diagnoses     Preop general physical exam    -  1    Type 2 diabetes mellitus without complication, without long-term current use of insulin (H)        Essential hypertension        Solitary kidney, acquired        Long-term (current) use of anticoagulants [Z79.01]          Care Instructions      Before Your Surgery      Call your surgeon if there is any change in your health. This includes signs of a cold or flu (such as a sore throat, runny nose, cough, rash or fever).    Do not smoke, drink alcohol or take over the counter medicine (unless your surgeon or primary care doctor tells you to) for the 24 hours before and after surgery.    If you take prescribed drugs: Follow your doctor s orders about which medicines to take and which to stop until after surgery.    Eating and drinking prior to surgery: follow the instructions from your surgeon    Take a shower or bath the night before surgery. Use the soap your surgeon gave you to gently clean your skin. If you do not have soap from your surgeon, use your regular soap. Do not shave or scrub the surgery site.  Wear clean pajamas and have clean sheets on your bed.           Follow-ups after your visit        Your next 10 appointments already scheduled     Jul 12, 2018  8:30 AM CDT   Cancer Rehab Treatment with Camilo Benitez, OT   [facility], Occupational Therapy - Outpatient ([facility])    [address], [address]  [place] [address]   [phone]            Jul 12, 2018  1:15 PM CDT   Cancer Rehab Treatment with Leena Albright, PT   [facility] ([facility])    [address], [address]  [place] [address]   [phone]            Jul 13, 2018   Procedure with Mary Jo Massey MD   [facility] Surgery [facility] Procedure Center ([facility] [place])    [address] [place] [address]  5th Floor  [facility] [address]   [phone]           Located in the Clinics and Surgery Center at [address] [address].   parking is very convenient and highly recommended.  is a $6 flat rate fee.  Both  and self parkers should enter the main arrival plaza from [facility]; parking attendants will direct you based on your parking preference.            Jul 13, 2018 12:15 PM CDT   (Arrive by 12:00 PM)   Post-Op with Mary Jo Massey MD   [facility] Ophthalmology ([facility] [place])    [address]  4th Floor  [facility] [address]   [phone]            Jul 19, 2018  1:00 PM CDT   Cancer Rehab Treatment with Lina Cheung, PT   [facility] ([facility])    [address], [address]  [place] [address]   [phone]            Jul 19, 2018  1:45 PM CDT   Cancer Rehab Treatment with Chelsi Turner, OT   [facility] ([facility])    [address], [address]  [place] [address]   [phone]            Jul 26, 2018 12:15 PM CDT   Cancer Rehab Treatment with Lina Cheung, PT   [facility] ([facility])    [address], [address]  [place] [address]   [phone]            Jul 26, 2018  1:00 PM CDT   Cancer Rehab Treatment with Chelsi Turner, OT   [facility] ([facility])    [address], [address]  [place] [address]   [phone]             Aug 02, 2018  1:00 PM CDT   Cancer Rehab Treatment with Chelsi Turner, OT   [facility] [place], Occupational Therapy - Outpatient ([facility])    [address]  [place] [address]   [phone]            Aug 02, 2018  2:00 PM CDT   Cancer Rehab Treatment with Lina Cheung, PT   [facility] [place], Physical Therapy - Outpatient ([facility])    [address]  [place] [address]   [phone]              Who to contact     If you have questions or need follow up information about today's clinic visit or your schedule please contact [facility] directly at [phone].  Normal or non-critical lab and imaging results will be communicated to you by MyChart, letter or phone within 4 business days after the clinic has received the results. If you do not hear from us within 7 days, please contact the clinic through MyChart or phone. If you have a critical or abnormal lab result, we will notify you by phone as soon as possible.  Submit refill requests through [employer] or call your pharmacy and they will forward the refill request to us. Please allow 3 business days for your refill to be completed.          Additional Information About Your Visit        [employer] Information     [employer] gives you secure access to your electronic health record. If you see a primary care provider, you can also send messages to your care team and make appointments. If you have questions, please call your primary care clinic.  If you do not have a primary care provider, please call [phone] and they will assist you.        Care EveryWhere ID     This is your Care EveryWhere ID. This could be used by other organizations to access your [place] medical records  [id number]        Your Vitals Were     Pulse Temperature Respirations Last Period Pulse Oximetry BMI (Body Mass Index)    84 98  F (36.7   C) (Oral) 18 01/01/2009 97% 35.32 kg/m2       Blood Pressure from Last 3 Encounters:   07/09/18 114/75   05/25/18 101/68   05/18/18 113/66    Weight from Last 3 Encounters:   07/09/18 199 lb 6 oz (90.4 kg)   05/25/18 194 lb (88 kg)   05/18/18 190 lb (86.2 kg)              We Performed the Following     Albumin Random Urine Quantitative with Creat Ratio     Basic metabolic panel  (Ca, Cl, CO2, Creat, Gluc, K, Na, BUN)     Hemoglobin          Today's Medication Changes          These changes are accurate as of 7/9/18  3:28 PM.  If you have any questions, ask your nurse or doctor.               These medicines have changed or have updated prescriptions.        Dose/Directions    loratadine 10 MG tablet   Commonly known as:  CLARITIN   This may have changed:    - when to take this  - reasons to take this   Used for:  Pain in joint, multiple sites        Dose:  10 mg   Take 1 tablet (10 mg) by mouth daily   Quantity:  30 tablet   Refills:  1                Primary Care Provider Office Phone # Fax #    Morelia Ayon [phone] [address] [facility] [address]        Equal Access to Services     RONEY ARANDA AH: Ward mckeono Soaxel, waaxda ludoraadaha, qaybta kaalmada adewoodyada, sujatha friedman. So [facility] [phone].    ATENCIÓN: Si habla español, tiene a martínez disposición servicios gratuitos de asistencia lingüística. Llame al [phone].    We comply with applicable federal civil rights laws and [place] laws. We do not discriminate on the basis of race, color, national origin, age, disability, sex, sexual orientation, or gender identity.            Thank you!     Thank you for choosing [facility]  for your care. Our goal is always to provide you with excellent care. Hearing back from our patients is one way we can continue to improve our services. Please take a few minutes to complete the written survey that you may receive in the  mail after your visit with us. Thank you!             Your Updated Medication List - Protect others around you: Learn how to safely use, store and throw away your medicines at www.disposemymeds.org.          This list is accurate as of 7/9/18  3:28 PM.  Always use your most recent med list.                   Brand Name Dispense Instructions for use Diagnosis    atorvastatin 80 MG tablet    LIPITOR    90 tablet    TAKE 1 TABLET(80 MG) BY MOUTH DAILY    Hyperlipidemia LDL goal <100       blood glucose lancets standard    no brand specified    100 each    Use to test blood sugar 2 times daily or as directed.    Type 2 diabetes mellitus without complication, without long-term current use of insulin (H)       blood glucose monitoring meter device kit    no brand specified    1 kit    Use to test blood sugar 2 times daily or as directed.    Type 2 diabetes mellitus without complication, without long-term current use of insulin (H)       blood glucose monitoring test strip    no brand specified    100 strip    Use to test blood sugars 2 times daily or as directed    Type 2 diabetes mellitus without complication, without long-term current use of insulin (H)       CALCIUM-MAGNESIUM-VITAMIN D PO      Take 2 tablets by mouth daily        EPINEPHrine 0.3 MG/0.3ML injection 2-pack    EPIPEN/ADRENACLICK/or ANY BX GENERIC EQUIV    0.3 mL    Inject 0.3 mLs (0.3 mg) into the muscle once as needed for anaphylaxis    Bee allergy status       escitalopram 20 MG tablet    LEXAPRO    30 tablet    Take 1 tablet (20 mg) by mouth daily    Major depressive disorder, recurrent episode, mild (H)       EYE DROPS OP      OTC saline eye drops        fluticasone 50 MCG/ACT spray    FLONASE    48 mL    SHAKE WELL AND USE 2 SPRAYS IN EACH NOSTRIL DAILY    Chronic maxillary sinusitis       lisinopril 10 MG tablet    PRINIVIL/ZESTRIL    90 tablet    Take 1 tablet (10 mg) by mouth daily    Essential hypertension       loratadine 10 MG tablet     "CLARITIN    30 tablet    Take 1 tablet (10 mg) by mouth daily    Pain in joint, multiple sites       LORazepam 1 MG tablet    ATIVAN    30 tablet    Take 1 tablet (1 mg) by mouth every 6 hours as needed (nausea/vomiting, anxiety or sleep)    Ovarian cancer, left (H), Encounter for long-term (current) use of medications       MAGNESIA PO      Take 500 mg by mouth        metFORMIN 500 MG 24 hr tablet    GLUCOPHAGE-XR    180 tablet    TAKE 2 TABLETS BY MOUTH ONCE DAILY WITH EVENING MEAL    Type 2 diabetes mellitus without complication, without long-term current use of insulin (H)       MULTIVITAMIN ADULT PO      Take 1 tablet by mouth daily        ofloxacin 0.3 % ophthalmic solution    OCUFLOX    1 Bottle    1 drop 4x daily in the surgical eye for 1 week after surgery, then stop    Nuclear sclerotic cataract, left       order for DME     1 Units    Equipment being ordered: blood pressure monitor    Essential hypertension       * order for DME     1 each    Compression stockings 20-30 mm Hg. To be wear when directed by Hematology team and while awake for the first two years post clot.    Long-term (current) use of anticoagulants, Acute deep vein thrombosis (DVT) of left lower extremity, unspecified vein (H)       * order for DME     1 Bottle    Plain packing strip 1/4\"    Abscess of leg       * order for DME     1 Box    Sterile zoey tipped applicators    Abscess of leg       * order for DME     3 Bottle    USE 1/4 INCH  WIDTH PLAIN NU GAUZE PACKING TO DO SELF WOUND CARE OF LEFT LOWER LEG ONCE DAILY. DIMENSIONS OF WOUND PRESENTLY ARE 2 X 2 INCH AND APPROXIMATELY 1/2 INCH DEEP.  USES 6 INCHES OF PACKING CURRENTLY FOR DRESSING CHANGE.  FAX TO [employer] [phone]    Wound of left leg       * order for DME     30 pad    STERILE ADHESIVE PAD BANDAGE AT LEAST 4X4 INCH IN SIZE NEEDED FOR DAILY WOUND CARE TO LEFT LOWER LEG. FAX TO [employer] [phone]    Leg wound, left       * order for DME     1 Units    " Home blood pressure monitor    Type 2 diabetes mellitus without complication, without long-term current use of insulin (H)       prednisoLONE acetate 1 % ophthalmic susp    PRED FORTE    1 Bottle    1 drop in surgical eye as directed, 4x daily after surgery for 1 week, 3x daily for 1 week, 2x daily for 1 week, daily for 1 week, then stop    Nuclear sclerotic cataract, left       PRO-BIOTIC BLEND PO      Take 1 capsule by mouth daily Enzymatic Therapy-probiotic pearls        rivaroxaban ANTICOAGULANT 20 MG Tabs tablet    XARELTO    30 tablet    Take 1 tablet (20 mg) by mouth daily (with dinner)    Long-term (current) use of anticoagulants, Acute deep vein thrombosis (DVT) of left lower extremity, unspecified vein (H)       traZODone 50 MG tablet    DESYREL    180 tablet    TAKE 1 TO 2 TABLETS(50  MG) BY MOUTH EVERY NIGHT AS NEEDED FOR SLEEP    Insomnia, unspecified type       VITAMIN B-COMPLEX PO           vitamin D 1000 units capsule      Take 2,000 Units by mouth daily        * Notice:  This list has 6 medication(s) that are the same as other medications prescribed for you. Read the directions carefully, and ask your doctor or other care provider to review them with you.

## 2018-07-09 NOTE — PROGRESS NOTES
[address] [address]  [phone]  Dept: [phone]    PRE-OP EVALUATION:  Today's date: 2018    Hafsa Corona (: 1955) presents for pre-operative evaluation assessment as requested by Dr. Massey.  She requires evaluation and anesthesia risk assessment prior to undergoing surgery/procedure for RIght Eye Phacoemulsification Clear Cornea with Standard Intraocular Lens Placement.    Fax number for surgical facility: [phone]  Primary Physician: Morelia Ayon  Type of Anesthesia Anticipated: to be determined    Patient has a Health Care Directive or Living Will:  NO    Preop Questions 2018   Who is doing your surgery? Dr. Mary Jo Massey   What are you having done? cataract, right eye   Date of Surgery/Procedure: 2018   Facility or Hospital where procedure/surgery will be performed: [facility]   1.  Do you have a history of Heart attack, stroke, stent, coronary bypass surgery, or other heart surgery? No   2.  Do you ever have any pain or discomfort in your chest? No   3.  Do you have a history of  Heart Failure? No   4.   Are you troubled by shortness of breath when:  walking on a level surface, or up a slight hill, or at night? No   5.  Do you currently have a cold, bronchitis or other respiratory infection? No   6.  Do you have a cough, shortness of breath, or wheezing? No   7.  Do you sometimes get pains in the calves of your legs when you walk? No   8. Do you or anyone in your family have previous history of blood clots? YES - pt had blood clot last year. Hematologist last year; probably caused by cancer.    9.  Do you or does anyone in your family have a serious bleeding problem such as prolonged bleeding following surgeries or cuts? No   10. Have you ever had problems with anemia or been told to take iron pills? YES - anemia    11. Have you had any abnormal blood loss such as black, tarry or bloody stools, or abnormal  vaginal bleeding? No   12. Have you ever had a blood transfusion? YES - for surgery last year    13. Have you or any of your relatives ever had problems with anesthesia? No   14. Do you have sleep apnea, excessive snoring or daytime drowsiness? YES - once diagnosed with mild sleep apnea. Cancer rehab to address daytime drowsiness    15. Do you have any prosthetic heart valves? No   16. Do you have prosthetic joints? No   17. Is there any chance that you may be pregnant? No         HPI:     HPI related to upcoming procedure: Decreased vision due to cataracts.      DIABETES - Patient has a longstanding history of Diabetes Type II . Patient is being treated with oral agents and denies significant side effects. Control has been good. Complicating factors include but are not limited to: hypertension and hyperlipidemia.                                                                                                                            .  HYPERTENSION - Patient has longstanding history of HTN , currently denies any symptoms referable to elevated blood pressure. Specifically denies chest pain, palpitations, dyspnea, orthopnea, PND or peripheral edema. Blood pressure readings have been in normal range. Current medication regimen is as listed below. Patient denies any side effects of medication.                                                                                                                                                                                          .  RENAL INSUFFICIENCY - Patient has a recent history of renal cell carcinoma and right nephrectomy.  Kidney function is mildly decreased, but stable.                                                          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) 09/15/2017     Priority: Medium     Pain in joint, multiple sites 09/15/2017     Priority: Medium     Ovarian cancer (H) 08/30/2017     Priority: Medium     Ovarian cancer, left (H) 08/01/2017     Priority: Medium     Encounter for long-term (current) use of medications 08/01/2017     Priority: Medium     S/P laparotomy 07/07/2017     Priority: Medium     Pelvic mass in female 07/03/2017     Priority: Medium     Long-term (current) use of anticoagulants [Z79.01] 06/06/2017     Priority: Medium     Deep vein thrombosis (DVT) (H) [I82.409] 06/06/2017     Priority: Medium     Major depressive disorder, recurrent episode, mild (H) 09/06/2016     Priority: Medium     Lumbago 04/04/2016     Priority: Medium     Essential hypertension 02/09/2016     Priority: Medium     BMI > 35 10/14/2015     Priority: Medium     Allergic rhinitis due to other allergen 12/20/2011     Priority: Medium     Type 2 diabetes mellitus without complication (H) 10/31/2010     Priority: Medium     HYPERLIPIDEMIA LDL GOAL <100 10/31/2010     Priority: Medium     Chronic Maxillary Sinusitis 11/21/2008     Priority: Medium     Tachycardia      Priority: Medium     Attention deficit disorder 07/19/2005     Priority: Medium     Problem list name updated by automated process. Provider to review       PANIC DISORDER  07/19/2005     Priority: Medium     VASOMOTOR RHINITIS 07/19/2005     Priority: Medium      Past Medical History:   Diagnosis Date     Anxiety state, unspecified     [id number]     Arthritis 2014    vague, gradual, not treated really, knees feel it     Attention deficit disorder without mention of hyperactivity      Cancer (H) 7/2017 and 1/2018    ovarian and kidney cancers, both treated well     Chronic rhinitis       Depressive disorder lifetime    plus Anxiety plus ADD. Escitalipram, therapy, ADD meds maybe     Depressive disorder, not elsewhere classified      Heart disease 1999    tachycardia and high cholesterol, managed     History of blood transfusion 7/2017    while in hospital for ovarian cancer     Hypertension 1999    tho BP was too LOW last week. past 12 years Generally OK     Panic disorder without agoraphobia      Pure hypercholesterolemia     Lipitor     Tachycardia      Tachycardia, unspecified     9/1999-2/2004     Type II or unspecified type diabetes mellitus without mention of complication, not stated as uncontrolled     diagnosed 2004     Past Surgical History:   Procedure Laterality Date     AS REMV KIDNEY,RADICAL Right      BIOPSY  7/2017 and 2/2018    ovarian and kidney cancer biopsies. also 1987 breast benign     BREAST SURGERY  1987    date unsure. benign breast cyst removed     CATARACT IOL, RT/LT Left 05/18/2018     COLONOSCOPY  2008 and 2013    polyps removed. Next due fall 2018     HYSTERECTOMY TOTAL ABDOMINAL, BILATERAL SALPINGO-OOPHORECTOMY, NODE DISSECTION, COMBINED Left 7/7/2017    Procedure: COMBINED HYSTERECTOMY TOTAL ABDOMINAL, SALPINGO-OOPHORECTOMY, NODE DISSECTION;  Exploratory Laparotomy, Left Salpingo-Oophorectomy, Cancer Staging, Total Hysterectomy, Omentectomy, Evacuation of abdominal fluid, Lymph Node Dissection  Anesthesia Block ;  Surgeon: Serena Cole MD;  Location: [facility] OR     HYSTERECTOMY, PAP NO LONGER INDICATED  07/07/2017    Laparotomy; for ovarian cancer staging     HYSTERECTOMY, PAP NO LONGER INDICATED       INSERT PORT VASCULAR ACCESS Right 8/21/2017    Procedure: INSERT PORT VASCULAR ACCESS;  Single Lumen Chest Power Port;  Surgeon: Stephen Mike PA-C;  Location: [facility] OR     LYMPHADENECTOMY RETROPERITONEAL Bilateral 07/07/2017    Laparotomy; pelvics & para-aortics; for ovarian cancer staging     OMENTECTOMY  07/07/2017    Laparotomy; for ovarian cancer staging      ORTHOPEDIC SURGERY  1/2002    broken left jaw due to fall, 4 fractures, titanium plates     PHACOEMULSIFICATION CLEAR CORNEA WITH TORIC INTRAOCULAR LENS IMPLANT Left 5/18/2018    Procedure: PHACOEMULSIFICATION CLEAR CORNEA WITH TORIC INTRAOCULAR LENS IMPLANT;  Left Eye Phacoemulsification with Toric Lens;  Surgeon: Mary Jo Massey MD;  Location: [facility] OR     SALPINGO OOPHORECTOMY,R/L/KAYY Right 2008    Salpingo Oophorectomy, RT     SALPINGO OOPHORECTOMY,R/L/KAYY Left 07/07/2017    Laparotomy; for ovarian cancer staging     SURGICAL HISTORY OF -       facial surgery d/t fall in 1/2002     SURGICAL HISTORY OF -       1985 removal of breast cyst     SURGICAL HISTORY OF -   2008    endometrial ablation     Current Outpatient Prescriptions   Medication Sig Dispense Refill     atorvastatin (LIPITOR) 80 MG tablet TAKE 1 TABLET(80 MG) BY MOUTH DAILY 90 tablet PRN     B Complex Vitamins (VITAMIN B-COMPLEX PO)        blood glucose (NO BRAND SPECIFIED) lancets standard Use to test blood sugar 2 times daily or as directed. 100 each 11     blood glucose monitoring (NO BRAND SPECIFIED) meter device kit Use to test blood sugar 2 times daily or as directed. 1 kit 0     blood glucose monitoring (NO BRAND SPECIFIED) test strip Use to test blood sugars 2 times daily or as directed 100 strip PRN     CALCIUM-MAGNESIUM-VITAMIN D PO Take 2 tablets by mouth daily       Cholecalciferol (VITAMIN D) 1000 UNIT capsule Take 2,000 Units by mouth daily        EPINEPHrine (EPIPEN/ADRENACLICK/OR ANY BX GENERIC EQUIV) 0.3 MG/0.3ML injection 2-pack Inject 0.3 mLs (0.3 mg) into the muscle once as needed for anaphylaxis 0.3 mL PRN     escitalopram (LEXAPRO) 20 MG tablet Take 1 tablet (20 mg) by mouth daily 30 tablet PRN     fluticasone (FLONASE) 50 MCG/ACT spray SHAKE WELL AND USE 2 SPRAYS IN EACH NOSTRIL DAILY 48 mL PRN     lisinopril (PRINIVIL/ZESTRIL) 10 MG tablet Take 1 tablet (10 mg) by mouth daily 90 tablet PRN     loratadine (CLARITIN) 10 MG  "tablet Take 1 tablet (10 mg) by mouth daily (Patient taking differently: Take 10 mg by mouth as needed ) 30 tablet 1     LORazepam (ATIVAN) 1 MG tablet Take 1 tablet (1 mg) by mouth every 6 hours as needed (nausea/vomiting, anxiety or sleep) 30 tablet 1     Magnesium Hydroxide (MAGNESIA PO) Take 500 mg by mouth       metFORMIN (GLUCOPHAGE-XR) 500 MG 24 hr tablet TAKE 2 TABLETS BY MOUTH ONCE DAILY WITH EVENING MEAL 180 tablet PRN     Multiple Vitamins-Minerals (MULTIVITAMIN ADULT PO) Take 1 tablet by mouth daily       order for DME Home blood pressure monitor 1 Units 0     order for DME STERILE ADHESIVE PAD BANDAGE AT LEAST 4X4 INCH IN SIZE NEEDED FOR DAILY WOUND CARE TO LEFT LOWER LEG.  FAX TO [employer] [phone] 30 pad 1     order for DME Equipment being ordered: blood pressure monitor 1 Units 0     Probiotic Product (PRO-BIOTIC BLEND PO) Take 1 capsule by mouth daily Enzymatic Therapy-probiotic pearls       rivaroxaban ANTICOAGULANT (XARELTO) 20 MG TABS tablet Take 1 tablet (20 mg) by mouth daily (with dinner) 30 tablet 3     Tetrahydrozoline HCl (EYE DROPS OP) OTC saline eye drops       ofloxacin (OCUFLOX) 0.3 % ophthalmic solution 1 drop 4x daily in the surgical eye for 1 week after surgery, then stop (Patient not taking: Reported on 7/9/2018) 1 Bottle 0     order for DME USE 1/4 INCH  WIDTH PLAIN NU GAUZE PACKING TO DO SELF WOUND CARE OF LEFT LOWER LEG ONCE DAILY.  DIMENSIONS OF WOUND PRESENTLY ARE 2 X 2 INCH AND APPROXIMATELY 1/2 INCH DEEP.  USES 6 INCHES OF PACKING CURRENTLY FOR DRESSING CHANGE.    FAX TO [employer] -[phone] (Patient not taking: Reported on 5/10/2018) 3 Bottle 2     order for DME Plain packing strip 1/4\" (Patient not taking: Reported on 5/10/2018) 1 Bottle PRN     order for DME Sterile zoey tipped applicators (Patient not taking: Reported on 5/10/2018) 1 Box PRN     order for DME Compression stockings 20-30 mm Hg. To be wear when directed by Hematology team and while " awake for the first two years post clot. (Patient not taking: Reported on 5/10/2018) 1 each 0     prednisoLONE acetate (PRED FORTE) 1 % ophthalmic susp 1 drop in surgical eye as directed, 4x daily after surgery for 1 week, 3x daily for 1 week, 2x daily for 1 week, daily for 1 week, then stop (Patient not taking: Reported on 7/9/2018) 1 Bottle 1     traZODone (DESYREL) 50 MG tablet TAKE 1 TO 2 TABLETS(50  MG) BY MOUTH EVERY NIGHT AS NEEDED FOR SLEEP (Patient not taking: Reported on 5/25/2018) 180 tablet 1     OTC products: None, except as noted above    Allergies   Allergen Reactions     Paclitaxel Anaphylaxis and Difficulty breathing     Wasps [Hornets] Anaphylaxis     Yellow Hornet Venom [Hornet Venom] Anaphylaxis and Swelling     Yellow Jacket Venom [Venomil Honey Bee] Anaphylaxis and Swelling     No Clinical Screening - See Comments      Taxol      Sulfa Drugs Hives      Latex Allergy: NO    Social History   Substance Use Topics     Smoking status: Never Smoker     Smokeless tobacco: Never Used     Alcohol use Yes      Comment: Very infrequent, a bit of wine or beer 2x per month maybe     History   Drug Use     Yes     Special: Marijuana     Comment: infrequent, for celebrations only, maybe 8x per year       REVIEW OF SYSTEMS:   CONSTITUTIONAL: NEGATIVE for fever, chills, change in weight  INTEGUMENTARY/SKIN: NEGATIVE for worrisome rashes, moles or lesions  ENT/MOUTH: NEGATIVE for ear, mouth and throat problems  RESP: NEGATIVE for significant cough or SOB  BREAST: NEGATIVE for masses, tenderness or discharge  CV: NEGATIVE for chest pain, palpitations or peripheral edema  GI: NEGATIVE for nausea, abdominal pain, heartburn, or change in bowel habits  : NEGATIVE for frequency, dysuria, or hematuria  MUSCULOSKELETAL: NEGATIVE for significant arthralgias or myalgia  NEURO: NEGATIVE for weakness, dizziness or paresthesias  ENDOCRINE: NEGATIVE for temperature intolerance, skin/hair changes  HEME: NEGATIVE for  bleeding problems  PSYCHIATRIC: NEGATIVE for changes in mood or affect    EXAM:   /75  Pulse 84  Temp 98  F (36.7  C) (Oral)  Resp 18  Wt 199 lb 6 oz (90.4 kg)  LMP 01/01/2009  SpO2 97%  BMI 35.32 kg/m2    GENERAL APPEARANCE: healthy, alert and no distress     EYES: EOMI, PERRL     HENT: ear canals and TM's normal and nose and mouth without ulcers or lesions     NECK: no adenopathy, no asymmetry, masses, or scars and thyroid normal to palpation     RESP: lungs clear to auscultation - no rales, rhonchi or wheezes     CV: regular rates and rhythm, normal S1 S2, no S3 or S4 and no murmur, click or rub     ABDOMEN:  soft, nontender, no HSM or masses and bowel sounds normal     MS: extremities normal- no gross deformities noted, no evidence of inflammation in joints, FROM in all extremities.     SKIN: no suspicious lesions or rashes     NEURO: Normal strength and tone, sensory exam grossly normal, mentation intact and speech normal     PSYCH: mentation appears normal. and affect normal/bright     LYMPHATICS: No cervical adenopathy    DIAGNOSTICS:   No labs or EKG required for low risk surgery (cataract, skin procedure, breast biopsy, etc)    Recent Labs   Lab Test  05/10/18   1558  03/22/18   1311  11/14/17   0949  10/16/17   0824   08/31/17   0749   08/21/17   0810   07/02/17   2255   HGB  11.1*   --   9.4*  9.7*   < >   --    < >   --    < >  8.6*   PLT   --    --   229  313   < >   --    < >   --    < >  296   INR   --    --    --    --    --    --    --   1.05   --   1.17*   NA  142  138  143  138   < >   --    < >   --    < >  141   POTASSIUM  4.4  4.1  4.1  4.1   < >   --    < >   --    < >  4.1   CR  1.16*  1.15*  0.71  0.67   < >   --    < >   --    < >  0.84   A1C  5.6   --    --    --    --   6.1*   --    --    --    --     < > = values in this interval not displayed.        IMPRESSION:   Reason for surgery/procedure: Right Eye Phacoemulsification Clear Cornea with Standard Intraocular Lens  Placement.      The proposed surgical procedure is considered LOW risk.    REVISED CARDIAC RISK INDEX  The patient has the following serious cardiovascular risks for perioperative complications such as (MI, PE, VFib and 3  AV Block):  No serious cardiac risks  INTERPRETATION: 0 risks: Class I (very low risk - 0.4% complication rate)    The patient has the following additional risks for perioperative complications:  No identified additional risks      ICD-10-CM    1. Preop general physical exam Z01.818    2. Type 2 diabetes mellitus without complication, without long-term current use of insulin (H) E11.9    3. Essential hypertension I10    4. Solitary kidney, acquired Z90.5    5. Long-term (current) use of anticoagulants [Z79.01] Z79.01        RECOMMENDATIONS:     Type 2 diabetes - Hold Metformin day of surgery  Hypertension - Monitor blood pressure gale- and post-operatively, hold Lisinopril the day of surgery  Long-term use of anticoagulants - Stay on Xarelto   Immunosuppression- due to chemotherapy; increased risk of infection, monitor for signs of infection    --Patient is to take all scheduled medications on the day of surgery EXCEPT for modifications listed below.    APPROVAL GIVEN to proceed with proposed procedure, without further diagnostic evaluation       Signed Electronically by: Morelia Ayon NP    Copy of this evaluation report is provided to requesting physician.    [facility] Preop Guidelines    Revised Cardiac Risk Index    She is currently getting cancer rehab and is working 24-26 hours a week.  Still having a lot of fatigue; going to multiple medical appointments (has upcoming surgery, allergy and dermatology appt, colonoscopy).  She is medically unable to increase her hours at this time.

## 2018-07-10 LAB
ANION GAP SERPL CALCULATED.3IONS-SCNC: 4 MMOL/L (ref 3–14)
BUN SERPL-MCNC: 28 MG/DL (ref 7–30)
CALCIUM SERPL-MCNC: 8.8 MG/DL (ref 8.5–10.1)
CHLORIDE SERPL-SCNC: 107 MMOL/L (ref 94–109)
CO2 SERPL-SCNC: 27 MMOL/L (ref 20–32)
CREAT SERPL-MCNC: 1.19 MG/DL (ref 0.52–1.04)
CREAT UR-MCNC: 64 MG/DL
GFR SERPL CREATININE-BSD FRML MDRD: 46 ML/MIN/1.7M2
GLUCOSE SERPL-MCNC: 90 MG/DL (ref 70–99)
MICROALBUMIN UR-MCNC: <5 MG/L
MICROALBUMIN/CREAT UR: NORMAL MG/G CR (ref 0–25)
POTASSIUM SERPL-SCNC: 4.2 MMOL/L (ref 3.4–5.3)
SODIUM SERPL-SCNC: 138 MMOL/L (ref 133–144)

## 2018-07-12 ENCOUNTER — HOSPITAL ENCOUNTER (OUTPATIENT)
Dept: OCCUPATIONAL THERAPY | Facility: CLINIC | Age: 63
Setting detail: THERAPIES SERIES
End: 2018-07-12
Attending: NURSE PRACTITIONER
Payer: COMMERCIAL

## 2018-07-12 ENCOUNTER — HOSPITAL ENCOUNTER (OUTPATIENT)
Dept: PHYSICAL THERAPY | Facility: CLINIC | Age: 63
Setting detail: THERAPIES SERIES
End: 2018-07-12
Attending: NURSE PRACTITIONER
Payer: COMMERCIAL

## 2018-07-12 ENCOUNTER — ANESTHESIA EVENT (OUTPATIENT)
Dept: SURGERY | Facility: AMBULATORY SURGERY CENTER | Age: 63
End: 2018-07-12

## 2018-07-12 DIAGNOSIS — H25.11 NUCLEAR SCLEROTIC CATARACT, RIGHT: Primary | ICD-10-CM

## 2018-07-12 PROCEDURE — 97535 SELF CARE MNGMENT TRAINING: CPT | Mod: GO | Performed by: OCCUPATIONAL THERAPIST

## 2018-07-12 PROCEDURE — 40000360 ZZHC STATISTIC PT CANCER REHAB VISIT: Performed by: PHYSICAL THERAPIST

## 2018-07-12 PROCEDURE — 40000125 ZZHC STATISTIC OT OUTPT VISIT: Performed by: OCCUPATIONAL THERAPIST

## 2018-07-12 PROCEDURE — 97110 THERAPEUTIC EXERCISES: CPT | Mod: GP | Performed by: PHYSICAL THERAPIST

## 2018-07-12 RX ORDER — PREDNISOLONE ACETATE 10 MG/ML
SUSPENSION/ DROPS OPHTHALMIC
Qty: 1 BOTTLE | Refills: 1 | Status: SHIPPED | OUTPATIENT
Start: 2018-07-12 | End: 2018-08-24

## 2018-07-12 RX ORDER — LIDOCAINE HYDROCHLORIDE 20 MG/ML
5 INJECTION, SOLUTION EPIDURAL; INFILTRATION; INTRACAUDAL; PERINEURAL ONCE
Status: COMPLETED | OUTPATIENT
Start: 2018-07-12 | End: 2018-07-13

## 2018-07-12 RX ORDER — OFLOXACIN 3 MG/ML
SOLUTION/ DROPS OPHTHALMIC
Qty: 1 BOTTLE | Refills: 0 | Status: SHIPPED | OUTPATIENT
Start: 2018-07-12 | End: 2018-08-24

## 2018-07-13 ENCOUNTER — SURGERY (OUTPATIENT)
Age: 63
End: 2018-07-13

## 2018-07-13 ENCOUNTER — ANESTHESIA (OUTPATIENT)
Dept: SURGERY | Facility: AMBULATORY SURGERY CENTER | Age: 63
End: 2018-07-13

## 2018-07-13 ENCOUNTER — HOSPITAL ENCOUNTER (OUTPATIENT)
Facility: AMBULATORY SURGERY CENTER | Age: 63
End: 2018-07-13
Attending: OPHTHALMOLOGY
Payer: COMMERCIAL

## 2018-07-13 ENCOUNTER — OFFICE VISIT (OUTPATIENT)
Dept: OPHTHALMOLOGY | Facility: CLINIC | Age: 63
End: 2018-07-13
Payer: COMMERCIAL

## 2018-07-13 VITALS
WEIGHT: 199.6 LBS | HEIGHT: 63 IN | SYSTOLIC BLOOD PRESSURE: 104 MMHG | BODY MASS INDEX: 35.37 KG/M2 | RESPIRATION RATE: 16 BRPM | HEART RATE: 73 BPM | TEMPERATURE: 97.2 F | DIASTOLIC BLOOD PRESSURE: 66 MMHG | OXYGEN SATURATION: 99 %

## 2018-07-13 DIAGNOSIS — Z96.1 PSEUDOPHAKIA, BOTH EYES: Primary | ICD-10-CM

## 2018-07-13 DIAGNOSIS — H25.11 NUCLEAR SCLEROTIC CATARACT, RIGHT: Primary | ICD-10-CM

## 2018-07-13 LAB — GLUCOSE BLDC GLUCOMTR-MCNC: 118 MG/DL (ref 70–99)

## 2018-07-13 DEVICE — IMPLANTABLE DEVICE: Type: IMPLANTABLE DEVICE | Site: EYE | Status: FUNCTIONAL

## 2018-07-13 RX ORDER — HEPARIN SODIUM,PORCINE 10 UNIT/ML
5-10 VIAL (ML) INTRAVENOUS
Status: DISCONTINUED | OUTPATIENT
Start: 2018-07-13 | End: 2018-07-14 | Stop reason: HOSPADM

## 2018-07-13 RX ORDER — PHENYLEPHRINE HYDROCHLORIDE 25 MG/ML
1 SOLUTION/ DROPS OPHTHALMIC
Status: COMPLETED | OUTPATIENT
Start: 2018-07-13 | End: 2018-07-13

## 2018-07-13 RX ORDER — FENTANYL CITRATE 50 UG/ML
INJECTION, SOLUTION INTRAMUSCULAR; INTRAVENOUS PRN
Status: DISCONTINUED | OUTPATIENT
Start: 2018-07-13 | End: 2018-07-13

## 2018-07-13 RX ORDER — LIDOCAINE 40 MG/G
CREAM TOPICAL
Status: DISCONTINUED | OUTPATIENT
Start: 2018-07-13 | End: 2018-07-14 | Stop reason: HOSPADM

## 2018-07-13 RX ORDER — HEPARIN SODIUM,PORCINE 10 UNIT/ML
5-10 VIAL (ML) INTRAVENOUS EVERY 24 HOURS
Status: DISCONTINUED | OUTPATIENT
Start: 2018-07-13 | End: 2018-07-14 | Stop reason: HOSPADM

## 2018-07-13 RX ORDER — ACETAMINOPHEN 325 MG/1
975 TABLET ORAL ONCE
Status: COMPLETED | OUTPATIENT
Start: 2018-07-13 | End: 2018-07-13

## 2018-07-13 RX ORDER — SODIUM CHLORIDE, SODIUM LACTATE, POTASSIUM CHLORIDE, CALCIUM CHLORIDE 600; 310; 30; 20 MG/100ML; MG/100ML; MG/100ML; MG/100ML
INJECTION, SOLUTION INTRAVENOUS CONTINUOUS PRN
Status: DISCONTINUED | OUTPATIENT
Start: 2018-07-13 | End: 2018-07-13

## 2018-07-13 RX ORDER — CYCLOPENTOLATE HYDROCHLORIDE 10 MG/ML
1 SOLUTION/ DROPS OPHTHALMIC
Status: COMPLETED | OUTPATIENT
Start: 2018-07-13 | End: 2018-07-13

## 2018-07-13 RX ORDER — HEPARIN SODIUM (PORCINE) LOCK FLUSH IV SOLN 100 UNIT/ML 100 UNIT/ML
5 SOLUTION INTRAVENOUS
Status: DISCONTINUED | OUTPATIENT
Start: 2018-07-13 | End: 2018-07-14 | Stop reason: HOSPADM

## 2018-07-13 RX ORDER — PROPOFOL 10 MG/ML
INJECTION, EMULSION INTRAVENOUS PRN
Status: DISCONTINUED | OUTPATIENT
Start: 2018-07-13 | End: 2018-07-13

## 2018-07-13 RX ORDER — BALANCED SALT SOLUTION 6.4; .75; .48; .3; 3.9; 1.7 MG/ML; MG/ML; MG/ML; MG/ML; MG/ML; MG/ML
SOLUTION OPHTHALMIC PRN
Status: DISCONTINUED | OUTPATIENT
Start: 2018-07-13 | End: 2018-07-13 | Stop reason: HOSPADM

## 2018-07-13 RX ORDER — PROPARACAINE HYDROCHLORIDE 5 MG/ML
1 SOLUTION/ DROPS OPHTHALMIC ONCE
Status: COMPLETED | OUTPATIENT
Start: 2018-07-13 | End: 2018-07-13

## 2018-07-13 RX ORDER — NALOXONE HYDROCHLORIDE 0.4 MG/ML
.1-.4 INJECTION, SOLUTION INTRAMUSCULAR; INTRAVENOUS; SUBCUTANEOUS
Status: DISCONTINUED | OUTPATIENT
Start: 2018-07-13 | End: 2018-07-14 | Stop reason: HOSPADM

## 2018-07-13 RX ORDER — PREDNISOLONE ACETATE 1 %
SUSPENSION, DROPS(FINAL DOSAGE FORM)(ML) OPHTHALMIC (EYE) PRN
Status: DISCONTINUED | OUTPATIENT
Start: 2018-07-13 | End: 2018-07-13 | Stop reason: HOSPADM

## 2018-07-13 RX ORDER — ONDANSETRON 4 MG/1
4 TABLET, ORALLY DISINTEGRATING ORAL EVERY 30 MIN PRN
Status: DISCONTINUED | OUTPATIENT
Start: 2018-07-13 | End: 2018-07-14 | Stop reason: HOSPADM

## 2018-07-13 RX ORDER — FLURBIPROFEN SODIUM 0.3 MG/ML
1 SOLUTION/ DROPS OPHTHALMIC
Status: DISCONTINUED | OUTPATIENT
Start: 2018-07-13 | End: 2018-07-13 | Stop reason: HOSPADM

## 2018-07-13 RX ORDER — ONDANSETRON 2 MG/ML
4 INJECTION INTRAMUSCULAR; INTRAVENOUS EVERY 30 MIN PRN
Status: DISCONTINUED | OUTPATIENT
Start: 2018-07-13 | End: 2018-07-14 | Stop reason: HOSPADM

## 2018-07-13 RX ORDER — TETRACAINE HYDROCHLORIDE 5 MG/ML
SOLUTION OPHTHALMIC PRN
Status: DISCONTINUED | OUTPATIENT
Start: 2018-07-13 | End: 2018-07-13 | Stop reason: HOSPADM

## 2018-07-13 RX ORDER — SODIUM CHLORIDE, SODIUM LACTATE, POTASSIUM CHLORIDE, CALCIUM CHLORIDE 600; 310; 30; 20 MG/100ML; MG/100ML; MG/100ML; MG/100ML
INJECTION, SOLUTION INTRAVENOUS CONTINUOUS
Status: DISCONTINUED | OUTPATIENT
Start: 2018-07-13 | End: 2018-07-14 | Stop reason: HOSPADM

## 2018-07-13 RX ORDER — MEPERIDINE HYDROCHLORIDE 25 MG/ML
12.5 INJECTION INTRAMUSCULAR; INTRAVENOUS; SUBCUTANEOUS
Status: DISCONTINUED | OUTPATIENT
Start: 2018-07-13 | End: 2018-07-14 | Stop reason: HOSPADM

## 2018-07-13 RX ORDER — OFLOXACIN 3 MG/ML
1 SOLUTION/ DROPS OPHTHALMIC
Status: COMPLETED | OUTPATIENT
Start: 2018-07-13 | End: 2018-07-13

## 2018-07-13 RX ORDER — ONDANSETRON 2 MG/ML
INJECTION INTRAMUSCULAR; INTRAVENOUS PRN
Status: DISCONTINUED | OUTPATIENT
Start: 2018-07-13 | End: 2018-07-13

## 2018-07-13 RX ADMIN — BALANCED SALT SOLUTION 15 ML: 6.4; .75; .48; .3; 3.9; 1.7 SOLUTION OPHTHALMIC at 08:35

## 2018-07-13 RX ADMIN — OFLOXACIN 1 DROP: 3 SOLUTION/ DROPS OPHTHALMIC at 07:41

## 2018-07-13 RX ADMIN — TETRACAINE HYDROCHLORIDE 2 DROP: 5 SOLUTION OPHTHALMIC at 08:22

## 2018-07-13 RX ADMIN — LIDOCAINE HYDROCHLORIDE 5 ML: 20 INJECTION, SOLUTION EPIDURAL; INFILTRATION; INTRACAUDAL; PERINEURAL at 08:24

## 2018-07-13 RX ADMIN — ONDANSETRON 4 MG: 2 INJECTION INTRAMUSCULAR; INTRAVENOUS at 08:32

## 2018-07-13 RX ADMIN — PROPOFOL 70 MG: 10 INJECTION, EMULSION INTRAVENOUS at 08:25

## 2018-07-13 RX ADMIN — PHENYLEPHRINE HYDROCHLORIDE 1 DROP: 25 SOLUTION/ DROPS OPHTHALMIC at 07:25

## 2018-07-13 RX ADMIN — CYCLOPENTOLATE HYDROCHLORIDE 1 DROP: 10 SOLUTION/ DROPS OPHTHALMIC at 07:25

## 2018-07-13 RX ADMIN — HEPARIN SODIUM (PORCINE) LOCK FLUSH IV SOLN 100 UNIT/ML 5 ML: 100 SOLUTION at 09:48

## 2018-07-13 RX ADMIN — FLURBIPROFEN SODIUM 1 DROP: 0.3 SOLUTION/ DROPS OPHTHALMIC at 07:38

## 2018-07-13 RX ADMIN — PROPARACAINE HYDROCHLORIDE 1 DROP: 5 SOLUTION/ DROPS OPHTHALMIC at 07:19

## 2018-07-13 RX ADMIN — ACETAMINOPHEN 975 MG: 325 TABLET ORAL at 07:56

## 2018-07-13 RX ADMIN — SODIUM CHLORIDE, SODIUM LACTATE, POTASSIUM CHLORIDE, CALCIUM CHLORIDE: 600; 310; 30; 20 INJECTION, SOLUTION INTRAVENOUS at 08:19

## 2018-07-13 RX ADMIN — FENTANYL CITRATE 25 MCG: 50 INJECTION, SOLUTION INTRAMUSCULAR; INTRAVENOUS at 08:22

## 2018-07-13 RX ADMIN — PHENYLEPHRINE HYDROCHLORIDE 1 DROP: 25 SOLUTION/ DROPS OPHTHALMIC at 07:19

## 2018-07-13 RX ADMIN — Medication 500 ML: at 08:36

## 2018-07-13 RX ADMIN — OFLOXACIN 1 DROP: 3 SOLUTION/ DROPS OPHTHALMIC at 07:45

## 2018-07-13 RX ADMIN — FLURBIPROFEN SODIUM 1 DROP: 0.3 SOLUTION/ DROPS OPHTHALMIC at 07:25

## 2018-07-13 RX ADMIN — CYCLOPENTOLATE HYDROCHLORIDE 1 DROP: 10 SOLUTION/ DROPS OPHTHALMIC at 07:38

## 2018-07-13 RX ADMIN — OFLOXACIN 1 DROP: 3 SOLUTION/ DROPS OPHTHALMIC at 07:39

## 2018-07-13 RX ADMIN — FLURBIPROFEN SODIUM 1 DROP: 0.3 SOLUTION/ DROPS OPHTHALMIC at 07:20

## 2018-07-13 RX ADMIN — PHENYLEPHRINE HYDROCHLORIDE 1 DROP: 25 SOLUTION/ DROPS OPHTHALMIC at 07:38

## 2018-07-13 RX ADMIN — CYCLOPENTOLATE HYDROCHLORIDE 1 DROP: 10 SOLUTION/ DROPS OPHTHALMIC at 07:19

## 2018-07-13 ASSESSMENT — EXTERNAL EXAM - RIGHT EYE: OD_EXAM: NORMAL

## 2018-07-13 ASSESSMENT — TONOMETRY
IOP_METHOD: TONOPEN
OD_IOP_MMHG: 10
OS_IOP_MMHG: 17

## 2018-07-13 ASSESSMENT — SLIT LAMP EXAM - LIDS: COMMENTS: PTOSIS

## 2018-07-13 ASSESSMENT — VISUAL ACUITY
METHOD: SNELLEN - LINEAR
OD_SC: 20/500
OS_SC: 20/25

## 2018-07-13 NOTE — PROCEDURES
Ophthalmology Post-op Procedure Note    Surgical Service:    Ophthalmology & Visual Sciences  Date Performed:      July 13, 2018  Location: [facility]      Pre-operative Diagnosis: Visually significant cataract, right eye  Post-operative Diagnosis:  Pseudophakia, right eye  Operative Procedure:  Phacoemulsification with intraocular lens implantation, right eye      Surgeon(s):  Fellow/Staff Surgeon:       Mary Jo Massey MD  Resident Surgeon:            Frank Whitmore MD    Anesthesia:   Retrobulbar/MAC  Findings:  No unusual findings   Blood Loss:    Minimal  Implants:  SN60WF 26.0 intraocular lens  Specimens:  None    Complications:  The patient did not experience any complications.   Condition: Stable    Operative Report Completion:    Description of Operation/Procedure:    After appropriate informed consent was obtained, the appropriate cardiac and blood pressure monitors were placed. The patient was brought to the operating room. A final pause occurred just before the start of the procedure during which the entire procedure team actively confirmed the correct patient, procedure, site, special equipment and special requirements. Under monitored anesthesia care, the patient was sedated with intravenous anesthesia. While the patient was sedated, retrobulbar anesthesia was achieved with 4 cc of 1% lidocaine, 0.375% bupivacaine and 75 units of Vitrase. An additional 1 cc of this anesthetic mixture was given around the orbicularis oculi muscle as the needle was being withdrawn from the muscle cone. The patient was prepped and draped in the usual sterile fashion using 5% povidone/iodine.     An eyelid speculum was placed to open the eyelids. A paracentesis port was placed approximately sixty degrees to the left of the planned temporal incision location using the sideport blade. The anterior chamber was filled with dispersive viscoelastic. A clear corneal temporal incision was made with a metal 2.6 mm keratome. A  round continuous tear capsulorhexis was initiated with a cystotome and completed with the Utrata forceps. Balanced salt solution on a cannula was used to perform hydrodissection. The nucleus was removed using phacoemulsification with a chop technique. The remaining cortical material was removed using the irrigation/aspiration handpiece. The capsular bag was filled with Provisc. A lens of the power and model listed above was placed into the capsular bag using the cartridge injection system. The remaining viscoelastic was removed using the irrigation aspiration handpiece. The paracentesis and temporal wounds were hydrated with balanced salt solution. At the conclusion of the case, the wounds were felt to be watertight, the pupil was round, the lens was centered, and the anterior chamber was deep. The eyelid speculum was removed. Antibiotic ointment was administered to the operative eye. A pressure patch was placed over the operative eye.        Attending Attestation:  I was present for and performed the entire procedure.  Mary Jo Massey MD

## 2018-07-13 NOTE — ANESTHESIA POSTPROCEDURE EVALUATION
Patient: Hafsa Corona    Procedure(s):  RIght Eye Phacoemulsification Clear Cornea with Standard Intraocular Lens Placement  - Wound Class: I-Clean    Diagnosis:Right Eye Cataract   Diagnosis Additional Information: No value filed.    Anesthesia Type:  MAC    Note:  Anesthesia Post Evaluation    Patient location during evaluation: bedside  Patient participation: Able to fully participate in evaluation  Level of consciousness: awake  Pain management: adequate  Airway patency: patent  Cardiovascular status: acceptable  Respiratory status: acceptable  Hydration status: acceptable  PONV: controlled     Anesthetic complications: None          Last vitals:  Vitals:    07/13/18 0705 07/13/18 0859   BP: 120/67 102/68   Pulse:  70   Resp: 16 16   Temp: 36.9  C (98.4  F) 36.1  C (97  F)   SpO2: 94% 99%         Electronically Signed By: Mohinder Nguyen MD, MD  July 13, 2018  9:38 AM

## 2018-07-13 NOTE — PROGRESS NOTES
POD#0, status post cataract surgery, right eye    No complaints.  Denies eye pain.    Impression/Plan:  Pseudophakia, OD: POD0, good post-operative appearance. IOP reasonable.    - Fluoroquinolone antibiotic 4x daily in the surgical eye for 1 week  - Prednisolone 4x daily in the surgical eye for 1 week, then weekly taper      Eye protection at all times and eye shield at night for 1 week.    Limited activities with no exercise or heavy lifting for 1 week.    Instructed patient to contact us for decreasing vision, eye pain, new floaters or flashes of light or other concerning symptoms.    Written instructions given    Return to clinic 3-4 weeks with refraction and dilation.    Teaching statement:  Complete documentation of historical and exam elements from today's encounter can be found in the full encounter summary report (not reduplicated in this progress note). I personally obtained the chief complaint(s) and history of present illness.  I confirmed and edited as necessary the review of systems, past medical/surgical history, family history, social history, and examination findings as documented by others; and I examined the patient myself. I personally reviewed the relevant tests, images, and reports as documented above.     I formulated and edited as necessary the assessment and plan and discussed the findings and management plan with the patient and family.    Mary Jo Massey MD  Comprehensive Ophthalmology & Ocular Pathology  Department of Ophthalmology and Visual Neurosciences  [email or web address]  Pager [phone]

## 2018-07-13 NOTE — ANESTHESIA CARE TRANSFER NOTE
Patient: Hafsa Corona    Procedure(s):  RIght Eye Phacoemulsification Clear Cornea with Standard Intraocular Lens Placement  - Wound Class: I-Clean    Diagnosis: Right Eye Cataract   Diagnosis Additional Information: No value filed.    Anesthesia Type:   MAC     Note:  Airway :Room Air  Patient transferred to:Phase II  Comments: VSS  Denies discomfort.   Report to Hodan Rob RNHandoff Report: Identifed the Patient, Identified the Reponsible Provider, Reviewed the pertinent medical history, Discussed the surgical course, Reviewed Intra-OP anesthesia mangement and issues during anesthesia, Set expectations for post-procedure period and Allowed opportunity for questions and acknowledgement of understanding      Vitals: (Last set prior to Anesthesia Care Transfer)    CRNA VITALS  7/13/2018 0822 - 7/13/2018 0910      7/13/2018             Pulse: 80    SpO2: 99 %    Resp Rate (set): 10                Electronically Signed By: MARLON Dubois CRNA  July 13, 2018  9:10 AM

## 2018-07-13 NOTE — IP AVS SNAPSHOT
MRN:[id number]                      After Visit Summary   7/13/2018    Hafsa Corona    MRN: [id number]           Thank you!     Thank you for choosing [facility] for your care. Our goal is always to provide you with excellent care. Hearing back from our patients is one way we can continue to improve our services. Please take a few minutes to complete the written survey that you may receive in the mail after you visit with us. Thank you!        Patient Information     Date Of Birth          1955        About your hospital stay     You were admitted on:  July 13, 2018 You last received care in the:  [facility]    You were discharged on:  July 13, 2018       Who to Call     For medical emergencies, please call 911.  For non-urgent questions about your medical care, please call your primary care provider or clinic, [phone]  For questions related to your surgery, please call your surgery clinic        Attending Provider     Provider Specialty    Mary Jo Massey MD Ophthalmology       Primary Care Provider Office Phone # Fax #    Morelia JAVIER Ayon [phone]      After Care Instructions     Eye drops as prescribed by physician.  Start drops today unless told otherwise by the physician           May use Tylenol or Advil for pain as directed by the physician           Notify Physician if you have severe headache or nausea           Remove patch per physician instruction           Return to clinic as instructed by physician           Wear eye shield or patch as directed                 Your next 10 appointments already scheduled     Jul 13, 2018 12:15 PM CDT   (Arrive by 12:00 PM)   Post-Op with Mary Jo Massey MD   [facility] ([facility])    [address] [place] [id number] [phone]            Jul 19, 2018  1:00 PM CDT   Cancer Rehab Treatment with Lina Cheung, GUANAKITO   [facility] ([facility])    [address], [address]  [place] [address]   [phone]            Jul 19, 2018  1:45 PM CDT   Cancer Rehab Treatment with Chelsi Turner, OT   [facility]    [address], [address]  [place] [address]   [phone]            Jul 26, 2018 12:15 PM CDT   Cancer Rehab Treatment with Lina Cheung, PT   [facility] - Outpatient ([facility])    [address], [address]  [place] [address]   [phone]            Jul 26, 2018  1:00 PM CDT   Cancer Rehab Treatment with Chelsi Turner, OT   [facility]    [address], [address]  [place] [address]   [phone]            Aug 02, 2018  1:00 PM CDT   Cancer Rehab Treatment with Chelsi Turner, OT   [facility] - [facility] ([facility])    [address], [address]  [place] [address]   [phone]            Aug 02, 2018  2:00 PM CDT   Cancer Rehab Treatment with Lina Cheung, PT   [facility] - [facility] ([facility])    [address], [address]  [place] [address]   [phone]            Aug 06, 2018 10:30 AM CDT   Post-Op with Mary Jo Massey MD   Eye Clinic ([facility])    [address]  [address] [address]  [facility] [address]   [phone]            Aug 24, 2018  2:00 PM CDT   (Arrive by 1:45 PM)   Return Visit with MARLON Ogden [facility] ([facility])    [address]  [address]  [facility]  [address]   [phone]            Sep 21, 2018 12:00 PM CDT   (Arrive by 11:45 AM)   Return Visit with MARLON Chiu   [facility] ([facility])    [address]  [address]  [facility] [address]   [phone]              Further instructions from your care team       [facility]  Home Care Following Anesthesia  For 24 hours after surgery:  1. Get plenty of rest.  A responsible adult must stay with you for at least 24 hours after you leave the surgery center.  2. Do not drive or use heavy equipment.  If you have weakness or tingling, don't drive or use heavy equipment until this feeling goes away.   3. Do not drink alcohol.   4. Avoid strenuous or risky activities.  Ask for help when climbing stairs.  5. You may feel lightheaded.  IF so, sit for a few minutes before standing.  Have someone help you get up.   6. If you have nausea (feel sick to your stomach): Drink only clear liquids such as apple juice, ginger ale, broth or 7-Up.  Rest may also help.  Be sure to drink enough fluids.  Move to a regular diet as you feel able.   7. You may have a slight fever.  Call the doctor if your fever is over 100 F (37.7 C) (taken under the tongue) or lasts longer than 24 hours.  8. You may have a dry mouth, a sore throat, muscle aches or trouble sleeping. These should go away after 24 hours.  9. Do not make important or legal decisions.               Tips for taking pain medications  To get the best pain relief possible, remember these points:    Take pain medications as directed, before pain becomes severe.    Pain medication can upset your stomach: taking it with food may help.    Constipation is a common side effect of pain medication. Drink plenty of  fluids.    Eat foods high in fiber. Take a stool softener if recommended by your doctor or pharmacist.    Do not drink alcohol, drive or operate machinery while  taking pain medications.    Ask about other ways to control pain, such as with heat, ice or relaxation.    Tylenol/Acetaminophen Consumption  To help encourage the safe use of acetaminophen, the makers of TYLENOL  have lowered the maximum daily dose for single-ingredient Extra Strength TYLENOL  (acetaminophen) products sold in the [place] from 8 pills per day (4,000 mg) to 6 pills per day (3,000 mg). The dosing interval has also changed from 2 pills every 4-6 hours to 2 pills every 6 hours.    If you feel your pain relief is insufficient, you may take Tylenol/Acetaminophen in addition to your narcotic pain medication.     Be careful not to exceed 3,000 mg of Tylenol/Acetaminophen in a 24 hour period from all sources.    If you are taking extra strength Tylenol/acetaminophen (500 mg), the maximum dose is 6 tablets in 24 hours.    If you are taking regular strength acetaminophen (325 mg), the maximum dose is 9 tablets in 24 hours.    Call a doctor for any of the followin. Signs of infection (fever, growing tenderness at the surgery site, a large amount of drainage or bleeding, severe pain, foul-smelling drainage, redness, swelling).  2. It has been over 8 to 10 hours since surgery and you are still not able to urinate (pass water).  3. Headache for over 24 hours.  4. Numbness, tingling or weakness the day after surgery (if you had spinal anesthesia).  Your doctor is:       Dr. Mary Jo Massey, Ophthalmology: [phone]               Or dial [phone] and ask for the resident on call for:  Ophthalmology  For emergency care, call the:  [place] [facility]:  [phone] (TTY for hearing impaired: [phone])                Pending Results     No orders found from 2018 to 2018.            Admission Information     Date & Time Provider Department Dept. Phone    2018 Mary Jo Massey MD [facility] [phone]      Your Vitals Were     Blood Pressure Temperature  "Respirations Height Weight Last Period    120/67 98.4  F (36.9  C) (Oral) 16 1.6 m (5' 3\") 90.5 kg (199 lb 9.6 oz) 01/01/2009    Pulse Oximetry BMI (Body Mass Index)                94% 35.36 kg/m2          [employer] Information     [employer] gives you secure access to your electronic health record. If you see a primary care provider, you can also send messages to your care team and make appointments. If you have questions, please call your primary care clinic.  If you do not have a primary care provider, please call [phone] and they will assist you.      [employer] is an electronic gateway that provides easy, online access to your medical records. With [employer], you can request a clinic appointment, read your test results, renew a prescription or communicate with your care team.     To access your existing account, please contact your [facility] or call [phone] for assistance.        Care EveryWhere ID     This is your Care EveryWhere ID. This could be used by other organizations to access your [facility] medical records  [id number]        Equal Access to Services     RONEY ARANDA : Hadii serena Leach, wayaritza leigh, qakeyshawn akersaljakub palm, sujatha friedman. So [facility] [phone].    ATENCIÓN: Si habla español, tiene a martínez disposición servicios gratuitos de asistencia lingüística. Elizabeth al [phone].    We comply with applicable federal civil rights laws and [place] laws. We do not discriminate on the basis of race, color, national origin, age, disability, sex, sexual orientation, or gender identity.               Review of your medicines      UNREVIEWED medicines. Ask your doctor about these medicines        Dose / Directions    atorvastatin 80 MG tablet   Commonly known as:  LIPITOR   Used for:  Hyperlipidemia LDL goal <100        TAKE 1 TABLET(80 MG) BY MOUTH DAILY   Quantity:  90 tablet   Refills:  PRN       CALCIUM-MAGNESIUM-VITAMIN D " PO        Dose:  2 tablet   Take 2 tablets by mouth daily   Refills:  0       EPINEPHrine 0.3 MG/0.3ML injection 2-pack   Commonly known as:  EPIPEN/ADRENACLICK/or ANY BX GENERIC EQUIV   Used for:  Bee allergy status        Dose:  0.3 mg   Inject 0.3 mLs (0.3 mg) into the muscle once as needed for anaphylaxis   Quantity:  0.3 mL   Refills:  PRN       escitalopram 20 MG tablet   Commonly known as:  LEXAPRO   Used for:  Major depressive disorder, recurrent episode, mild (H)        Dose:  20 mg   Take 1 tablet (20 mg) by mouth daily   Quantity:  30 tablet   Refills:  PRN       EYE DROPS OP        OTC saline eye drops   Refills:  0       fluticasone 50 MCG/ACT spray   Commonly known as:  FLONASE   Used for:  Chronic maxillary sinusitis        SHAKE WELL AND USE 2 SPRAYS IN EACH NOSTRIL DAILY   Quantity:  48 mL   Refills:  PRN       lisinopril 10 MG tablet   Commonly known as:  PRINIVIL/ZESTRIL   Used for:  Essential hypertension        Dose:  10 mg   Take 1 tablet (10 mg) by mouth daily   Quantity:  90 tablet   Refills:  PRN       loratadine 10 MG tablet   Commonly known as:  CLARITIN   Used for:  Pain in joint, multiple sites        Dose:  10 mg   Take 1 tablet (10 mg) by mouth daily   Quantity:  30 tablet   Refills:  1       LORazepam 1 MG tablet   Commonly known as:  ATIVAN   Used for:  Ovarian cancer, left (H), Encounter for long-term (current) use of medications        Dose:  1 mg   Take 1 tablet (1 mg) by mouth every 6 hours as needed (nausea/vomiting, anxiety or sleep)   Quantity:  30 tablet   Refills:  1       MAGNESIA PO        Dose:  500 mg   Take 500 mg by mouth   Refills:  0       metFORMIN 500 MG 24 hr tablet   Commonly known as:  GLUCOPHAGE-XR   Used for:  Type 2 diabetes mellitus without complication, without long-term current use of insulin (H)        TAKE 2 TABLETS BY MOUTH ONCE DAILY WITH EVENING MEAL   Quantity:  180 tablet   Refills:  PRN       MULTIVITAMIN ADULT PO        Dose:  1 tablet   Take 1  tablet by mouth daily   Refills:  0       * ofloxacin 0.3 % ophthalmic solution   Commonly known as:  OCUFLOX   Used for:  Nuclear sclerotic cataract, left        1 drop 4x daily in the surgical eye for 1 week after surgery, then stop   Quantity:  1 Bottle   Refills:  0       * ofloxacin 0.3 % ophthalmic solution   Commonly known as:  OCUFLOX   Used for:  Nuclear sclerotic cataract, right        1 drop 4x daily in the surgical eye for 1 week after surgery, then stop   Quantity:  1 Bottle   Refills:  0       * prednisoLONE acetate 1 % ophthalmic susp   Commonly known as:  PRED FORTE   Used for:  Nuclear sclerotic cataract, left        1 drop in surgical eye as directed, 4x daily after surgery for 1 week, 3x daily for 1 week, 2x daily for 1 week, daily for 1 week, then stop   Quantity:  1 Bottle   Refills:  1       * prednisoLONE acetate 1 % ophthalmic susp   Commonly known as:  PRED FORTE   Used for:  Nuclear sclerotic cataract, right        1 drop in surgical eye as directed, 4x daily after surgery for 1 week, 3x daily for 1 week, 2x daily for 1 week, daily for 1 week, then stop   Quantity:  1 Bottle   Refills:  1       PRO-BIOTIC BLEND PO        Dose:  1 capsule   Take 1 capsule by mouth daily Enzymatic Therapy-probiotic pearls   Refills:  0       rivaroxaban ANTICOAGULANT 20 MG Tabs tablet   Commonly known as:  XARELTO   Used for:  Long-term (current) use of anticoagulants, Acute deep vein thrombosis (DVT) of left lower extremity, unspecified vein (H)        Dose:  20 mg   Take 1 tablet (20 mg) by mouth daily (with dinner)   Quantity:  30 tablet   Refills:  3       traZODone 50 MG tablet   Commonly known as:  DESYREL   Used for:  Insomnia, unspecified type        TAKE 1 TO 2 TABLETS(50  MG) BY MOUTH EVERY NIGHT AS NEEDED FOR SLEEP   Quantity:  180 tablet   Refills:  1       VITAMIN B-COMPLEX PO        Refills:  0       vitamin D 1000 units capsule        Dose:  2000 Units   Take 2,000 Units by mouth daily  "  Refills:  0       * Notice:  This list has 4 medication(s) that are the same as other medications prescribed for you. Read the directions carefully, and ask your doctor or other care provider to review them with you.      CONTINUE these medicines which have NOT CHANGED        Dose / Directions    blood glucose lancets standard   Commonly known as:  no brand specified   Used for:  Type 2 diabetes mellitus without complication, without long-term current use of insulin (H)        Use to test blood sugar 2 times daily or as directed.   Quantity:  100 each   Refills:  11       blood glucose monitoring meter device kit   Commonly known as:  no brand specified   Used for:  Type 2 diabetes mellitus without complication, without long-term current use of insulin (H)        Use to test blood sugar 2 times daily or as directed.   Quantity:  1 kit   Refills:  0       blood glucose monitoring test strip   Commonly known as:  no brand specified   Used for:  Type 2 diabetes mellitus without complication, without long-term current use of insulin (H)        Use to test blood sugars 2 times daily or as directed   Quantity:  100 strip   Refills:  PRN       order for DME   Used for:  Essential hypertension        Equipment being ordered: blood pressure monitor   Quantity:  1 Units   Refills:  0       * order for DME   Used for:  Long-term (current) use of anticoagulants, Acute deep vein thrombosis (DVT) of left lower extremity, unspecified vein (H)        Compression stockings 20-30 mm Hg. To be wear when directed by Hematology team and while awake for the first two years post clot.   Quantity:  1 each   Refills:  0       * order for DME   Used for:  Abscess of leg        Plain packing strip 1/4\"   Quantity:  1 Bottle   Refills:  PRN       * order for DME   Used for:  Abscess of leg        Sterile zoey tipped applicators   Quantity:  1 Box   Refills:  PRN       * order for DME   Used for:  Wound of left leg        USE 1/4 INCH  WIDTH " PLAIN NU GAUZE PACKING TO DO SELF WOUND CARE OF LEFT LOWER LEG ONCE DAILY. DIMENSIONS OF WOUND PRESENTLY ARE 2 X 2 INCH AND APPROXIMATELY 1/2 INCH DEEP.  USES 6 INCHES OF PACKING CURRENTLY FOR DRESSING CHANGE.  FAX TO [employer] [phone]   Quantity:  3 Bottle   Refills:  2       * order for DME   Used for:  Leg wound, left        STERILE ADHESIVE PAD BANDAGE AT LEAST 4X4 INCH IN SIZE NEEDED FOR DAILY WOUND CARE TO LEFT LOWER LEG. FAX TO [employer] [facility] [phone]   Quantity:  30 pad   Refills:  1       * order for DME   Used for:  Type 2 diabetes mellitus without complication, without long-term current use of insulin (H)        Home blood pressure monitor   Quantity:  1 Units   Refills:  0       * Notice:  This list has 6 medication(s) that are the same as other medications prescribed for you. Read the directions carefully, and ask your doctor or other care provider to review them with you.             Protect others around you: Learn how to safely use, store and throw away your medicines at www.disposemymeds.org.             Medication List: This is a list of all your medications and when to take them. Check marks below indicate your daily home schedule. Keep this list as a reference.      Medications           Morning Afternoon Evening Bedtime As Needed    atorvastatin 80 MG tablet   Commonly known as:  LIPITOR   TAKE 1 TABLET(80 MG) BY MOUTH DAILY                                blood glucose lancets standard   Commonly known as:  no brand specified   Use to test blood sugar 2 times daily or as directed.                                blood glucose monitoring meter device kit   Commonly known as:  no brand specified   Use to test blood sugar 2 times daily or as directed.                                blood glucose monitoring test strip   Commonly known as:  no brand specified   Use to test blood sugars 2 times daily or as directed                                CALCIUM-MAGNESIUM-VITAMIN D PO   Take 2  tablets by mouth daily                                EPINEPHrine 0.3 MG/0.3ML injection 2-pack   Commonly known as:  EPIPEN/ADRENACLICK/or ANY BX GENERIC EQUIV   Inject 0.3 mLs (0.3 mg) into the muscle once as needed for anaphylaxis                                escitalopram 20 MG tablet   Commonly known as:  LEXAPRO   Take 1 tablet (20 mg) by mouth daily                                EYE DROPS OP   OTC saline eye drops                                fluticasone 50 MCG/ACT spray   Commonly known as:  FLONASE   SHAKE WELL AND USE 2 SPRAYS IN EACH NOSTRIL DAILY                                lisinopril 10 MG tablet   Commonly known as:  PRINIVIL/ZESTRIL   Take 1 tablet (10 mg) by mouth daily                                loratadine 10 MG tablet   Commonly known as:  CLARITIN   Take 1 tablet (10 mg) by mouth daily                                LORazepam 1 MG tablet   Commonly known as:  ATIVAN   Take 1 tablet (1 mg) by mouth every 6 hours as needed (nausea/vomiting, anxiety or sleep)                                MAGNESIA PO   Take 500 mg by mouth                                metFORMIN 500 MG 24 hr tablet   Commonly known as:  GLUCOPHAGE-XR   TAKE 2 TABLETS BY MOUTH ONCE DAILY WITH EVENING MEAL                                MULTIVITAMIN ADULT PO   Take 1 tablet by mouth daily                                * ofloxacin 0.3 % ophthalmic solution   Commonly known as:  OCUFLOX   1 drop 4x daily in the surgical eye for 1 week after surgery, then stop   Last time this was given:  1 drop on 7/13/2018  7:45 AM                                * ofloxacin 0.3 % ophthalmic solution   Commonly known as:  OCUFLOX   1 drop 4x daily in the surgical eye for 1 week after surgery, then stop   Last time this was given:  1 drop on 7/13/2018  7:45 AM                                order for DME   Equipment being ordered: blood pressure monitor                                * order for DME   Compression stockings 20-30 mm Hg. To be  "wear when directed by Hematology team and while awake for the first two years post clot.                                * order for DME   Plain packing strip 1/4\"                                * order for DME   Sterile zoey tipped applicators                                * order for DME   USE 1/4 INCH  WIDTH PLAIN NU GAUZE PACKING TO DO SELF WOUND CARE OF LEFT LOWER LEG ONCE DAILY. DIMENSIONS OF WOUND PRESENTLY ARE 2 X 2 INCH AND APPROXIMATELY 1/2 INCH DEEP.  USES 6 INCHES OF PACKING CURRENTLY FOR DRESSING CHANGE.  FAX TO [facility] [phone]                                * order for DME   STERILE ADHESIVE PAD BANDAGE AT LEAST 4X4 INCH IN SIZE NEEDED FOR DAILY WOUND CARE TO LEFT LOWER LEG. FAX TO [facility] [phone]                                * order for DME   Home blood pressure monitor                                * prednisoLONE acetate 1 % ophthalmic susp   Commonly known as:  PRED FORTE   1 drop in surgical eye as directed, 4x daily after surgery for 1 week, 3x daily for 1 week, 2x daily for 1 week, daily for 1 week, then stop                                * prednisoLONE acetate 1 % ophthalmic susp   Commonly known as:  PRED FORTE   1 drop in surgical eye as directed, 4x daily after surgery for 1 week, 3x daily for 1 week, 2x daily for 1 week, daily for 1 week, then stop                                PRO-BIOTIC BLEND PO   Take 1 capsule by mouth daily Enzymatic Therapy-probiotic pearls                                rivaroxaban ANTICOAGULANT 20 MG Tabs tablet   Commonly known as:  XARELTO   Take 1 tablet (20 mg) by mouth daily (with dinner)                                traZODone 50 MG tablet   Commonly known as:  DESYREL   TAKE 1 TO 2 TABLETS(50  MG) BY MOUTH EVERY NIGHT AS NEEDED FOR SLEEP                                VITAMIN B-COMPLEX PO                                vitamin D 1000 units capsule   Take 2,000 Units by mouth daily                                * " Notice:  This list has 10 medication(s) that are the same as other medications prescribed for you. Read the directions carefully, and ask your doctor or other care provider to review them with you.

## 2018-07-13 NOTE — DISCHARGE INSTRUCTIONS
[facility]  Home Care Following Anesthesia  For 24 hours after surgery:  1. Get plenty of rest.  A responsible adult must stay with you for at least 24 hours after you leave the surgery center.  2. Do not drive or use heavy equipment.  If you have weakness or tingling, don't drive or use heavy equipment until this feeling goes away.   3. Do not drink alcohol.   4. Avoid strenuous or risky activities.  Ask for help when climbing stairs.  5. You may feel lightheaded.  IF so, sit for a few minutes before standing.  Have someone help you get up.   6. If you have nausea (feel sick to your stomach): Drink only clear liquids such as apple juice, ginger ale, broth or 7-Up.  Rest may also help.  Be sure to drink enough fluids.  Move to a regular diet as you feel able.   7. You may have a slight fever.  Call the doctor if your fever is over 100 F (37.7 C) (taken under the tongue) or lasts longer than 24 hours.  8. You may have a dry mouth, a sore throat, muscle aches or trouble sleeping. These should go away after 24 hours.  9. Do not make important or legal decisions.               Tips for taking pain medications  To get the best pain relief possible, remember these points:    Take pain medications as directed, before pain becomes severe.    Pain medication can upset your stomach: taking it with food may help.    Constipation is a common side effect of pain medication. Drink plenty of  fluids.    Eat foods high in fiber. Take a stool softener if recommended by your doctor or pharmacist.    Do not drink alcohol, drive or operate machinery while taking pain medications.    Ask about other ways to control pain, such as with heat, ice or relaxation.    Tylenol/Acetaminophen Consumption  To help encourage the safe use of acetaminophen, the makers of TYLENOL  have lowered the maximum daily dose for single-ingredient Extra Strength TYLENOL  (acetaminophen) products sold in the [place] from 8  pills per day (4,000 mg) to 6 pills per day (3,000 mg). The dosing interval has also changed from 2 pills every 4-6 hours to 2 pills every 6 hours.    If you feel your pain relief is insufficient, you may take Tylenol/Acetaminophen in addition to your narcotic pain medication.     Be careful not to exceed 3,000 mg of Tylenol/Acetaminophen in a 24 hour period from all sources.    If you are taking extra strength Tylenol/acetaminophen (500 mg), the maximum dose is 6 tablets in 24 hours.    If you are taking regular strength acetaminophen (325 mg), the maximum dose is 9 tablets in 24 hours.    Call a doctor for any of the followin. Signs of infection (fever, growing tenderness at the surgery site, a large amount of drainage or bleeding, severe pain, foul-smelling drainage, redness, swelling).  2. It has been over 8 to 10 hours since surgery and you are still not able to urinate (pass water).  3. Headache for over 24 hours.  4. Numbness, tingling or weakness the day after surgery (if you had spinal anesthesia).  Your doctor is:       Dr. Mary Jo Massey, Ophthalmology: [phone]               Or dial [phone] and ask for the resident on call for:  Ophthalmology  For emergency care, call the:  [facility]:  [phone] (TTY for hearing impaired: [phone])

## 2018-07-13 NOTE — IP AVS SNAPSHOT
[facility]    [address] [place] [address]    Phone:  [phone]    Fax:  [phone]                                       After Visit Summary   7/13/2018    Hafsa Corona    MRN: [id number]           After Visit Summary Signature Page     I have received my discharge instructions, and my questions have been answered. I have discussed any challenges I see with this plan with the nurse or doctor.    ..........................................................................................................................................  Patient/Patient Representative Signature      ..........................................................................................................................................  Patient Representative Print Name and Relationship to Patient    ..................................................               ................................................  Date                                            Time    ..........................................................................................................................................  Reviewed by Signature/Title    ...................................................              ..............................................  Date                                                            Time

## 2018-07-13 NOTE — NURSING NOTE
Chief Complaints and History of Present Illnesses   Patient presents with     Post Op (Ophthalmology) Right Eye     POD#0 s/p RIght Eye Phacoemulsification Clear Cornea with Standard Intraocular Lens Placement      HPI    Affected eye(s):  Right   Symptoms:     Blurred vision   No floaters   No flashes   No itching   No burning         Do you have eye pain now?:  No      Comments:    Patient notes she is feeling okay right now, no concerns     Jesi Sanchez July 13, 2018 10:39 AM

## 2018-07-13 NOTE — MR AVS SNAPSHOT
After Visit Summary   7/13/2018    Hafsa Corona    MRN: [id number]           Patient Information     Date Of Birth          1955        Visit Information        Provider Department      7/13/2018 12:15 PM Mary Jo Massey MD  [facility]        Today's Diagnoses     Pseudophakia, both eyes    -  1       Follow-ups after your visit        Follow-up notes from your care team     Return in about 3 weeks (around 8/3/2018).      Your next 10 appointments already scheduled     Jul 13, 2018 12:15 PM CDT   (Arrive by 12:00 PM)   Post-Op with MD [facility] ([facility])    [address] [facility] [address]   [phone]            Jul 19, 2018  1:00 PM CDT   Cancer Rehab Treatment with Lina Cheung, PT   [facility] [place], Physical [facility] - Outpatient ([facility])    [address]  [place] [address]   [phone]            Jul 19, 2018  1:45 PM CDT   Cancer Rehab Treatment with Chelsi Turner, OT   [facility]    [address]  [place] [address]   [phone]            Jul 26, 2018 12:15 PM CDT   Cancer Rehab Treatment with Lina Cheung, PT   [facility] [place] - [facility]patient ([facility])    [address]  [place] [address]   [phone]            Jul 26, 2018  1:00 PM CDT   Cancer Rehab Treatment with Chelsi Turner, OT   [facility]    [address]  [place] [address]   [phone]            Aug 02, 2018  1:00 PM CDT   Cancer Rehab Treatment with Chelsi Turner, OT   [facility])    [address]  [place] [address]   [phone]            Aug 02, 2018  2:00 PM CDT   Cancer Rehab Treatment with Lina Cheung PT   [facility] [place], Physical Therapy - Outpatient ([facility])    [address]  [place] [facility] [address]   [phone]            Aug 06, 2018 10:30 AM CDT   Post-Op with Mary Jo Massey MD   [facility] ([facility])    [address]  [facility] [address]   [phone]            Aug 24, 2018  2:00 PM CDT   (Arrive by 1:45 PM)   Return Visit with MARLON [facility] ([facility])    [address]  [address]  [facility] [id number] [phone]            Sep 21, 2018 12:00 PM CDT   (Arrive by 11:45 AM)   Return Visit with MARLON Chiu [facility] ([facility])    [address]  [address]  [facility] [id number] [phone]              Who to contact     Please call your clinic at [phone] to:    Ask questions about your health    Make or cancel appointments    Discuss your medicines    Learn about your test results    Speak to your doctor            Additional Information About Your Visit        [employer] Information     [facility] gives you secure access to your electronic health record. If you see a primary care provider, you can also send messages to your care team and make appointments. If you have questions, please call your primary care clinic.  If you do not have a primary care provider, please call [phone] and they will assist you.      [facility] is an electronic gateway that provides easy, online access to your medical records. With [facility], you can request a clinic appointment, read your test results, renew a prescription or  communicate with your care team.     To access your existing account, please contact your [facility] or call [phone] for assistance.        Care EveryWhere ID     This is your Care EveryWhere ID. This could be used by other organizations to access your [facility] medical records  [id number]        Your Vitals Were     Last Period                   01/01/2009            Blood Pressure from Last 3 Encounters:   07/13/18 104/66   07/09/18 114/75   05/25/18 101/68    Weight from Last 3 Encounters:   07/13/18 90.5 kg (199 lb 9.6 oz)   07/09/18 90.4 kg (199 lb 6 oz)   05/25/18 88 kg (194 lb)              Today, you had the following     No orders found for display         Today's Medication Changes          These changes are accurate as of 7/13/18 11:58 AM.  If you have any questions, ask your nurse or doctor.               These medicines have changed or have updated prescriptions.        Dose/Directions    loratadine 10 MG tablet   Commonly known as:  CLARITIN   This may have changed:    - when to take this  - reasons to take this   Used for:  Pain in joint, multiple sites        Dose:  10 mg   Take 1 tablet (10 mg) by mouth daily   Quantity:  30 tablet   Refills:  1                Primary Care Provider Office Phone # Fax #    Morelia Ayon [phone] [address] [facility] [address]        Equal Access to Services     RONEY ARANDA AH: Hadii serena mckeono Soaxel, waaxda luqadaha, qaybta kaalmada sujatha palm. So [facility] [phone].    ATENCIÓN: Si habla español, tiene a martínez disposición servicios gratuitos de asistencia lingüística. Llame al [phone].    We comply with applicable federal civil rights laws and [place] laws. We do not discriminate on the basis of race, color, national origin, age, disability, sex, sexual orientation, or gender identity.            Thank you!     Thank you for choosing [facility]  for your care. Our goal is always to provide you with excellent care. Hearing back from our patients is one way we can continue to improve our services. Please take a few minutes to complete the written survey that you may receive in the mail after your visit with us. Thank you!             Your Updated Medication List - Protect others around you: Learn how to safely use, store and throw away your medicines at www.disposemymeds.org.          This list is accurate as of 7/13/18 11:58 AM.  Always use your most recent med list.                   Brand Name Dispense Instructions for use Diagnosis    atorvastatin 80 MG tablet    LIPITOR    90 tablet    TAKE 1 TABLET(80 MG) BY MOUTH DAILY    Hyperlipidemia LDL goal <100       blood glucose lancets standard    no brand specified    100 each    Use to test blood sugar 2 times daily or as directed.    Type 2 diabetes mellitus without complication, without long-term current use of insulin (H)       blood glucose monitoring meter device kit    no brand specified    1 kit    Use to test blood sugar 2 times daily or as directed.    Type 2 diabetes mellitus without complication, without long-term current use of insulin (H)       blood glucose monitoring test strip    no brand specified    100 strip    Use to test blood sugars 2 times daily or as directed    Type 2 diabetes mellitus without complication, without long-term current use of insulin (H)       CALCIUM-MAGNESIUM-VITAMIN D PO      Take 2 tablets by mouth daily        EPINEPHrine 0.3 MG/0.3ML injection 2-pack    EPIPEN/ADRENACLICK/or ANY BX GENERIC EQUIV    0.3 mL    Inject 0.3 mLs (0.3 mg) into the muscle once as needed for anaphylaxis    Bee allergy status       escitalopram 20 MG tablet    LEXAPRO    30 tablet    Take 1 tablet (20 mg) by mouth daily    Major depressive disorder, recurrent episode, mild (H)       EYE DROPS OP      OTC saline eye drops        fluticasone 50 MCG/ACT spray    FLONASE  "   48 mL    SHAKE WELL AND USE 2 SPRAYS IN EACH NOSTRIL DAILY    Chronic maxillary sinusitis       lisinopril 10 MG tablet    PRINIVIL/ZESTRIL    90 tablet    Take 1 tablet (10 mg) by mouth daily    Essential hypertension       loratadine 10 MG tablet    CLARITIN    30 tablet    Take 1 tablet (10 mg) by mouth daily    Pain in joint, multiple sites       LORazepam 1 MG tablet    ATIVAN    30 tablet    Take 1 tablet (1 mg) by mouth every 6 hours as needed (nausea/vomiting, anxiety or sleep)    Ovarian cancer, left (H), Encounter for long-term (current) use of medications       MAGNESIA PO      Take 500 mg by mouth        metFORMIN 500 MG 24 hr tablet    GLUCOPHAGE-XR    180 tablet    TAKE 2 TABLETS BY MOUTH ONCE DAILY WITH EVENING MEAL    Type 2 diabetes mellitus without complication, without long-term current use of insulin (H)       MULTIVITAMIN ADULT PO      Take 1 tablet by mouth daily        * ofloxacin 0.3 % ophthalmic solution    OCUFLOX    1 Bottle    1 drop 4x daily in the surgical eye for 1 week after surgery, then stop    Nuclear sclerotic cataract, left       * ofloxacin 0.3 % ophthalmic solution    OCUFLOX    1 Bottle    1 drop 4x daily in the surgical eye for 1 week after surgery, then stop    Nuclear sclerotic cataract, right       order for DME     1 Units    Equipment being ordered: blood pressure monitor    Essential hypertension       * order for DME     1 each    Compression stockings 20-30 mm Hg. To be wear when directed by Hematology team and while awake for the first two years post clot.    Long-term (current) use of anticoagulants, Acute deep vein thrombosis (DVT) of left lower extremity, unspecified vein (H)       * order for DME     1 Bottle    Plain packing strip 1/4\"    Abscess of leg       * order for DME     1 Box    Sterile zoey tipped applicators    Abscess of leg       * order for DME     3 Bottle    USE 1/4 INCH  WIDTH PLAIN NU GAUZE PACKING TO DO SELF WOUND CARE OF LEFT LOWER LEG " ONCE DAILY. DIMENSIONS OF WOUND PRESENTLY ARE 2 X 2 INCH AND APPROXIMATELY 1/2 INCH DEEP.  USES 6 INCHES OF PACKING CURRENTLY FOR DRESSING CHANGE.  FAX TO [facility] [phone]    Wound of left leg       * order for DME     30 pad    STERILE ADHESIVE PAD BANDAGE AT LEAST 4X4 INCH IN SIZE NEEDED FOR DAILY WOUND CARE TO LEFT LOWER LEG. FAX TO [facility] [phone]    Leg wound, left       * order for DME     1 Units    Home blood pressure monitor    Type 2 diabetes mellitus without complication, without long-term current use of insulin (H)       * prednisoLONE acetate 1 % ophthalmic susp    PRED FORTE    1 Bottle    1 drop in surgical eye as directed, 4x daily after surgery for 1 week, 3x daily for 1 week, 2x daily for 1 week, daily for 1 week, then stop    Nuclear sclerotic cataract, left       * prednisoLONE acetate 1 % ophthalmic susp    PRED FORTE    1 Bottle    1 drop in surgical eye as directed, 4x daily after surgery for 1 week, 3x daily for 1 week, 2x daily for 1 week, daily for 1 week, then stop    Nuclear sclerotic cataract, right       PRO-BIOTIC BLEND PO      Take 1 capsule by mouth daily Enzymatic Therapy-probiotic pearls        rivaroxaban ANTICOAGULANT 20 MG Tabs tablet    XARELTO    30 tablet    Take 1 tablet (20 mg) by mouth daily (with dinner)    Long-term (current) use of anticoagulants, Acute deep vein thrombosis (DVT) of left lower extremity, unspecified vein (H)       traZODone 50 MG tablet    DESYREL    180 tablet    TAKE 1 TO 2 TABLETS(50  MG) BY MOUTH EVERY NIGHT AS NEEDED FOR SLEEP    Insomnia, unspecified type       VITAMIN B-COMPLEX PO           vitamin D 1000 units capsule      Take 2,000 Units by mouth daily        * Notice:  This list has 10 medication(s) that are the same as other medications prescribed for you. Read the directions carefully, and ask your doctor or other care provider to review them with you.

## 2018-07-19 ENCOUNTER — HOSPITAL ENCOUNTER (OUTPATIENT)
Dept: PHYSICAL THERAPY | Facility: CLINIC | Age: 63
Setting detail: THERAPIES SERIES
End: 2018-07-19
Attending: NURSE PRACTITIONER
Payer: COMMERCIAL

## 2018-07-19 ENCOUNTER — HOSPITAL ENCOUNTER (OUTPATIENT)
Dept: OCCUPATIONAL THERAPY | Facility: CLINIC | Age: 63
Setting detail: THERAPIES SERIES
End: 2018-07-19
Attending: NURSE PRACTITIONER
Payer: COMMERCIAL

## 2018-07-19 DIAGNOSIS — E78.5 HYPERLIPIDEMIA LDL GOAL <100: ICD-10-CM

## 2018-07-19 PROCEDURE — 97110 THERAPEUTIC EXERCISES: CPT | Mod: GP | Performed by: PHYSICAL THERAPIST

## 2018-07-19 PROCEDURE — 97535 SELF CARE MNGMENT TRAINING: CPT | Mod: GO | Performed by: OCCUPATIONAL THERAPIST

## 2018-07-19 PROCEDURE — 40000361 ZZHC STATISTIC OT CANCER REHAB VISIT: Performed by: OCCUPATIONAL THERAPIST

## 2018-07-19 PROCEDURE — 40000360 ZZHC STATISTIC PT CANCER REHAB VISIT: Performed by: PHYSICAL THERAPIST

## 2018-07-20 NOTE — TELEPHONE ENCOUNTER
"Requested Prescriptions   Pending Prescriptions Disp Refills     atorvastatin (LIPITOR) 80 MG tablet [Pharmacy Med Name: ATORVASTATIN 80MG TABLETS]  Last Written Prescription Date:  5-10-18  Last Fill Quantity: 90 tab,  # refills: PRN   Last office visit: 7/9/2018 with prescribing provider:  Melly Ayon    Future Office Visit:    90 tablet 0     Sig: TAKE 1 TABLET(80 MG) BY MOUTH DAILY    Statins Protocol Passed    7/19/2018  3:44 AM       Passed - LDL on file in past 12 months    Recent Labs   Lab Test  11/01/17   1457   LDL  70          Passed - No abnormal creatine kinase in past 12 months    No lab results found.        Passed - Recent (12 mo) or future (30 days) visit within the authorizing provider's specialty    Patient had office visit in the last 12 months or has a visit in the next 30 days with authorizing provider or within the authorizing provider's specialty.  See \"Patient Info\" tab in inbasket, or \"Choose Columns\" in Meds & Orders section of the refill encounter.           Passed - Patient is age 18 or older       Passed - No active pregnancy on record       Passed - No positive pregnancy test in past 12 months          "

## 2018-07-24 RX ORDER — ATORVASTATIN CALCIUM 80 MG/1
TABLET, FILM COATED ORAL
Qty: 90 TABLET | Refills: 0 | Status: SHIPPED | OUTPATIENT
Start: 2018-07-24 | End: 2018-08-24

## 2018-07-25 NOTE — ADDENDUM NOTE
Encounter addended by: Lina Cheung, PT on: 7/25/2018 11:30 AM<BR>     Actions taken: Flowsheet data copied forward, Flowsheet accepted

## 2018-07-26 ENCOUNTER — HOSPITAL ENCOUNTER (OUTPATIENT)
Dept: OCCUPATIONAL THERAPY | Facility: CLINIC | Age: 63
Setting detail: THERAPIES SERIES
End: 2018-07-26
Attending: NURSE PRACTITIONER
Payer: COMMERCIAL

## 2018-07-26 ENCOUNTER — TELEPHONE (OUTPATIENT)
Dept: FAMILY MEDICINE | Facility: CLINIC | Age: 63
End: 2018-07-26

## 2018-07-26 ENCOUNTER — HOSPITAL ENCOUNTER (OUTPATIENT)
Dept: PHYSICAL THERAPY | Facility: CLINIC | Age: 63
Setting detail: THERAPIES SERIES
End: 2018-07-26
Attending: NURSE PRACTITIONER
Payer: COMMERCIAL

## 2018-07-26 PROCEDURE — 97535 SELF CARE MNGMENT TRAINING: CPT | Mod: GO | Performed by: OCCUPATIONAL THERAPIST

## 2018-07-26 PROCEDURE — 40000360 ZZHC STATISTIC PT CANCER REHAB VISIT: Performed by: PHYSICAL THERAPIST

## 2018-07-26 PROCEDURE — 40000361 ZZHC STATISTIC OT CANCER REHAB VISIT: Performed by: OCCUPATIONAL THERAPIST

## 2018-07-26 PROCEDURE — 97110 THERAPEUTIC EXERCISES: CPT | Mod: GP | Performed by: PHYSICAL THERAPIST

## 2018-07-26 NOTE — TELEPHONE ENCOUNTER
[facility] called and wanted to let Morelia Arellano know that they are closing Hafsa's case. The patient has not been responding to them. Please Advise thank you

## 2018-07-29 ENCOUNTER — NURSE TRIAGE (OUTPATIENT)
Dept: NURSING | Facility: CLINIC | Age: 63
End: 2018-07-29

## 2018-07-29 ENCOUNTER — OFFICE VISIT (OUTPATIENT)
Dept: URGENT CARE | Facility: URGENT CARE | Age: 63
End: 2018-07-29
Payer: COMMERCIAL

## 2018-07-29 VITALS
TEMPERATURE: 97.8 F | DIASTOLIC BLOOD PRESSURE: 84 MMHG | BODY MASS INDEX: 35.43 KG/M2 | HEART RATE: 59 BPM | OXYGEN SATURATION: 98 % | WEIGHT: 200 LBS | SYSTOLIC BLOOD PRESSURE: 132 MMHG

## 2018-07-29 DIAGNOSIS — E11.9 TYPE 2 DIABETES MELLITUS WITHOUT COMPLICATION, WITHOUT LONG-TERM CURRENT USE OF INSULIN (H): ICD-10-CM

## 2018-07-29 DIAGNOSIS — N34.2 URETHRITIS: Primary | ICD-10-CM

## 2018-07-29 LAB
ALBUMIN UR-MCNC: >=300 MG/DL
APPEARANCE UR: CLEAR
BACTERIA #/AREA URNS HPF: ABNORMAL /HPF
BILIRUB UR QL STRIP: NEGATIVE
COLOR UR AUTO: ABNORMAL
GLUCOSE UR STRIP-MCNC: NEGATIVE MG/DL
HGB UR QL STRIP: ABNORMAL
KETONES UR STRIP-MCNC: NEGATIVE MG/DL
LEUKOCYTE ESTERASE UR QL STRIP: ABNORMAL
NITRATE UR QL: NEGATIVE
PH UR STRIP: 7 PH (ref 5–7)
RBC #/AREA URNS AUTO: >100 /HPF
SOURCE: ABNORMAL
SP GR UR STRIP: 1.02 (ref 1–1.03)
UROBILINOGEN UR STRIP-ACNC: 0.2 EU/DL (ref 0.2–1)
WBC #/AREA URNS AUTO: ABNORMAL /HPF

## 2018-07-29 PROCEDURE — 81001 URINALYSIS AUTO W/SCOPE: CPT | Performed by: PHYSICIAN ASSISTANT

## 2018-07-29 PROCEDURE — 99214 OFFICE O/P EST MOD 30 MIN: CPT | Performed by: FAMILY MEDICINE

## 2018-07-29 RX ORDER — CEPHALEXIN 500 MG/1
500 CAPSULE ORAL 3 TIMES DAILY
Qty: 21 CAPSULE | Refills: 0 | Status: SHIPPED | OUTPATIENT
Start: 2018-07-29 | End: 2019-01-29

## 2018-07-29 NOTE — MR AVS SNAPSHOT
After Visit Summary   7/29/2018    Hafsa Corona    MRN: [id number]           Patient Information     Date Of Birth          1955        Visit Information        Provider Department      7/29/2018 12:20 PM Diana Emmanuel MD [facility]        Today's Diagnoses     Urethritis    -  1       Follow-ups after your visit        Follow-up notes from your care team     Return if symptoms worsen or fail to improve.      Your next 10 appointments already scheduled     Aug 02, 2018  1:00 PM CDT   Cancer Rehab Treatment with Chelsi Turner, OT   [facility] ([facility])    [address], [address]  [place] [address]   [phone]            Aug 02, 2018  2:00 PM CDT   Cancer Rehab Treatment with Lina Cheung, PT   [facility], Physical Therapy - Outpatient ([facility])    [address], [address]  [place] [address]   [phone]            Aug 06, 2018 10:30 AM CDT   Post-Op with Mary Jo Massey MD   Eye Clinic ([facility])    [address]  [facility] [address]   [phone]            Aug 09, 2018 10:15 AM CDT   Cancer Rehab Treatment with Lina Cheung, PT   [facility] ([facility])    [address], [address]  [place] [address]   [phone]            Aug 10, 2018  1:00 PM CDT   Cancer Rehab Treatment with Chelsi Turner, OT   [facility] ([facility])    [address], [address]  [place] [address]   [phone]            Aug 15, 2018  1:00 PM CDT   Cancer Rehab Treatment with Chelsi Turner, OT   [facility] ([facility])    [address]  [place] [address]   [phone]            Aug 15, 2018  2:15 PM CDT   Cancer Rehab Treatment with Leena Albright, PT   [facility] [place], Physical Therapy - Outpatient ([facility])    [address], [address]  [place] [address]   [phone]            Aug 22, 2018  2:15 PM CDT   Cancer Rehab Treatment with Leena Albright, PT   [facility] [place], Physical Therapy - Outpatient ([facility])    [address], [address]  [place] [address]   [phone]            Aug 22, 2018  3:00 PM CDT   Cancer Rehab Treatment with Chelsi Turner, OT   [facility] [place], Occupational Therapy - Outpatient ([facility])    [address], [address]  [place] [address]   [phone]            Aug 24, 2018  2:00 PM CDT   (Arrive by 1:45 PM)   Return Visit with MARLON [facility] ([email or web address])    [address] [place] [address]  [address]  [facility] [address] [phone]              Who to contact     If you have questions or need follow up information about today's clinic visit or your schedule please contact [facility] directly at [phone].  Normal or non-critical lab and imaging results will be communicated to you by MyChart, letter or phone within 4 business days after the clinic has received the results. If you do not hear from us within 7 days, please contact the clinic through MyChart or phone. If you have a critical or abnormal lab result, we will notify you by phone as soon as possible.  Submit refill requests through [employer] or call your pharmacy and they will forward the refill request to us. Please allow 3 business days for your refill to be completed.          Additional Information  About Your Visit        My[employer]ossip[employer] TechMap Information     [employer] gives you secure access to your electronic health record. If you see a primary care provider, you can also send messages to your care team and make appointments. If you have questions, please call your primary care clinic.  If you do not have a primary care provider, please call [phone] and they will assist you.        Care EveryWhere ID     This is your Care EveryWhere ID. This could be used by other organizations to access your [facility] medical records  [id number]        Your Vitals Were     Pulse Temperature Last Period Pulse Oximetry BMI (Body Mass Index)       59 97.8  F (36.6  C) (Tympanic) 01/01/2009 98% 35.43 kg/m2        Blood Pressure from Last 3 Encounters:   07/29/18 132/84   07/13/18 104/66   07/09/18 114/75    Weight from Last 3 Encounters:   07/29/18 200 lb (90.7 kg)   07/13/18 199 lb 9.6 oz (90.5 kg)   07/09/18 199 lb 6 oz (90.4 kg)              We Performed the Following     *UA reflex to Microscopic and Culture ([facility] (except [place] and [place])     Urine Microscopic          Today's Medication Changes          These changes are accurate as of 7/29/18  1:05 PM.  If you have any questions, ask your nurse or doctor.               Start taking these medicines.        Dose/Directions    cephALEXin 500 MG capsule   Commonly known as:  KEFLEX   Used for:  Urethritis   Started by:  Diana Emmanuel MD        Dose:  500 mg   Take 1 capsule (500 mg) by mouth 3 times daily for 7 days   Quantity:  21 capsule   Refills:  0       phenazopyridine 97.5 MG tablet   Commonly known as:  AZO   Used for:  Urethritis   Started by:  Diana Emmanuel MD        Dose:  97.5 mg   Take 1 tablet (97.5 mg) by mouth 3 times daily for 3 days   Quantity:  9 tablet   Refills:  0         These medicines have changed or have updated prescriptions.        Dose/Directions    loratadine 10 MG tablet   Commonly  known as:  CLARITIN   This may have changed:    - when to take this  - reasons to take this   Used for:  Pain in joint, multiple sites        Dose:  10 mg   Take 1 tablet (10 mg) by mouth daily   Quantity:  30 tablet   Refills:  1            Where to get your medicines      These medications were sent to [id number] - [place], [place] - [address] [facility]  [address], [place] [address]    Hours:  24-hours Phone:  [phone]     cephALEXin 500 MG capsule    phenazopyridine 97.5 MG tablet                Primary Care Provider Office Phone # Fax #    [place]lia HERRERA ATTILA Ayon [phone] [address] [place]  [facility] [address]        Equal Access to Services     RONEY ARANDA AH: Hadii serena joyner hadasho Soomaali, waaxda luqadaha, qaybta kaalmada adeegyada, waxay mini hayalessandro betts . So [facility] [phone].    ATENCIÓN: Si habla español, tiene a martínez disposición servicios gratuitos de asistencia lingüística. Elizabeth al [phone].    We comply with applicable federal civil rights laws and [place] laws. We do not discriminate on the basis of race, color, national origin, age, disability, sex, sexual orientation, or gender identity.            Thank you!     Thank you for choosing [facility]  for your care. Our goal is always to provide you with excellent care. Hearing back from our patients is one way we can continue to improve our services. Please take a few minutes to complete the written survey that you may receive in the mail after your visit with us. Thank you!             Your Updated Medication List - Protect others around you: Learn how to safely use, store and throw away your medicines at www.disposemymeds.org.          This list is accurate as of 7/29/18  1:05 PM.  Always use your most recent med list.                   Brand Name Dispense Instructions for use Diagnosis    * atorvastatin 80 MG tablet     LIPITOR    90 tablet    TAKE 1 TABLET(80 MG) BY MOUTH DAILY    Hyperlipidemia LDL goal <100       * atorvastatin 80 MG tablet    LIPITOR    90 tablet    TAKE 1 TABLET(80 MG) BY MOUTH DAILY    Hyperlipidemia LDL goal <100       blood glucose lancets standard    no brand specified    100 each    Use to test blood sugar 2 times daily or as directed.    Type 2 diabetes mellitus without complication, without long-term current use of insulin (H)       blood glucose monitoring meter device kit    no brand specified    1 kit    Use to test blood sugar 2 times daily or as directed.    Type 2 diabetes mellitus without complication, without long-term current use of insulin (H)       blood glucose monitoring test strip    no brand specified    100 strip    Use to test blood sugars 2 times daily or as directed    Type 2 diabetes mellitus without complication, without long-term current use of insulin (H)       CALCIUM-MAGNESIUM-VITAMIN D PO      Take 2 tablets by mouth daily        cephALEXin 500 MG capsule    KEFLEX    21 capsule    Take 1 capsule (500 mg) by mouth 3 times daily for 7 days    Urethritis       EPINEPHrine 0.3 MG/0.3ML injection 2-pack    EPIPEN/ADRENACLICK/or ANY BX GENERIC EQUIV    0.3 mL    Inject 0.3 mLs (0.3 mg) into the muscle once as needed for anaphylaxis    Bee allergy status       escitalopram 20 MG tablet    LEXAPRO    30 tablet    Take 1 tablet (20 mg) by mouth daily    Major depressive disorder, recurrent episode, mild (H)       EYE DROPS OP      OTC saline eye drops        fluticasone 50 MCG/ACT spray    FLONASE    48 mL    SHAKE WELL AND USE 2 SPRAYS IN EACH NOSTRIL DAILY    Chronic maxillary sinusitis       lisinopril 10 MG tablet    PRINIVIL/ZESTRIL    90 tablet    Take 1 tablet (10 mg) by mouth daily    Essential hypertension       loratadine 10 MG tablet    CLARITIN    30 tablet    Take 1 tablet (10 mg) by mouth daily    Pain in joint, multiple sites       LORazepam 1 MG tablet    ATIVAN    30  "tablet    Take 1 tablet (1 mg) by mouth every 6 hours as needed (nausea/vomiting, anxiety or sleep)    Ovarian cancer, left (H), Encounter for long-term (current) use of medications       MAGNESIA PO      Take 500 mg by mouth        metFORMIN 500 MG 24 hr tablet    GLUCOPHAGE-XR    180 tablet    TAKE 2 TABLETS BY MOUTH ONCE DAILY WITH EVENING MEAL    Type 2 diabetes mellitus without complication, without long-term current use of insulin (H)       MULTIVITAMIN ADULT PO      Take 1 tablet by mouth daily        * ofloxacin 0.3 % ophthalmic solution    OCUFLOX    1 Bottle    1 drop 4x daily in the surgical eye for 1 week after surgery, then stop    Nuclear sclerotic cataract, left       * ofloxacin 0.3 % ophthalmic solution    OCUFLOX    1 Bottle    1 drop 4x daily in the surgical eye for 1 week after surgery, then stop    Nuclear sclerotic cataract, right       order for DME     1 Units    Equipment being ordered: blood pressure monitor    Essential hypertension       * order for DME     1 each    Compression stockings 20-30 mm Hg. To be wear when directed by Hematology team and while awake for the first two years post clot.    Long-term (current) use of anticoagulants, Acute deep vein thrombosis (DVT) of left lower extremity, unspecified vein (H)       * order for DME     1 Bottle    Plain packing strip 1/4\"    Abscess of leg       * order for DME     1 Box    Sterile zoey tipped applicators    Abscess of leg       * order for DME     3 Bottle    USE 1/4 INCH  WIDTH PLAIN NU GAUZE PACKING TO DO SELF WOUND CARE OF LEFT LOWER LEG ONCE DAILY. DIMENSIONS OF WOUND PRESENTLY ARE 2 X 2 INCH AND APPROXIMATELY 1/2 INCH DEEP.  USES 6 INCHES OF PACKING CURRENTLY FOR DRESSING CHANGE.  FAX TO [facility] [phone]    Wound of left leg       * order for DME     30 pad    STERILE ADHESIVE PAD BANDAGE AT LEAST 4X4 INCH IN SIZE NEEDED FOR DAILY WOUND CARE TO LEFT LOWER LEG. FAX TO [employer] [phone]    Leg wound, " left       * order for DME     1 Units    Home blood pressure monitor    Type 2 diabetes mellitus without complication, without long-term current use of insulin (H)       phenazopyridine 97.5 MG tablet    AZO    9 tablet    Take 1 tablet (97.5 mg) by mouth 3 times daily for 3 days    Urethritis       * prednisoLONE acetate 1 % ophthalmic susp    PRED FORTE    1 Bottle    1 drop in surgical eye as directed, 4x daily after surgery for 1 week, 3x daily for 1 week, 2x daily for 1 week, daily for 1 week, then stop    Nuclear sclerotic cataract, left       * prednisoLONE acetate 1 % ophthalmic susp    PRED FORTE    1 Bottle    1 drop in surgical eye as directed, 4x daily after surgery for 1 week, 3x daily for 1 week, 2x daily for 1 week, daily for 1 week, then stop    Nuclear sclerotic cataract, right       PRO-BIOTIC BLEND PO      Take 1 capsule by mouth daily Enzymatic Therapy-probiotic pearls        rivaroxaban ANTICOAGULANT 20 MG Tabs tablet    XARELTO    30 tablet    Take 1 tablet (20 mg) by mouth daily (with dinner)    Long-term (current) use of anticoagulants, Acute deep vein thrombosis (DVT) of left lower extremity, unspecified vein (H)       traZODone 50 MG tablet    DESYREL    180 tablet    TAKE 1 TO 2 TABLETS(50  MG) BY MOUTH EVERY NIGHT AS NEEDED FOR SLEEP    Insomnia, unspecified type       VITAMIN B-COMPLEX PO           vitamin D 1000 units capsule      Take 2,000 Units by mouth daily        * Notice:  This list has 12 medication(s) that are the same as other medications prescribed for you. Read the directions carefully, and ask your doctor or other care provider to review them with you.

## 2018-07-29 NOTE — TELEPHONE ENCOUNTER
"Requested Prescriptions   Pending Prescriptions Disp Refills     metFORMIN (GLUCOPHAGE-XR) 500 MG 24 hr tablet [Pharmacy Med Name: METFORMIN ER 500MG 24HR TABS]  Last Written Prescription Date:  5/10/18  Last Fill Quantity: 180,  # refills: PRN   Last office visit: 7/9/2018 with prescribing provider:     Future Office Visit:     180 tablet 0     Sig: TAKE 2 TABLETS BY MOUTH ONCE DAILY WITH EVENING MEAL    Biguanide Agents Passed    7/29/2018  3:45 AM       Passed - Blood pressure less than 140/90 in past 6 months    BP Readings from Last 3 Encounters:   07/29/18 132/84   07/13/18 104/66   07/09/18 114/75          Passed - Patient has documented LDL within the past 12 mos.    Recent Labs   Lab Test  11/01/17   1457   LDL  70          Passed - Patient has had a Microalbumin in the past 12 mos.    Recent Labs   Lab Test  07/09/18   1359   MICROL  <5   UMALCR  Unable to calculate due to low value          Passed - Patient is age 10 or older       Passed - Patient has documented A1c within the specified period of time.    If HgbA1C is 8 or greater, it needs to be on file within the past 3 months.  If less than 8, must be on file within the past 6 months.     Recent Labs   Lab Test  05/10/18   1558   A1C  5.6          Passed - Patient's CR is NOT>1.4 OR Patient's EGFR is NOT<45 within past 12 mos.    Recent Labs   Lab Test  07/09/18   1351   GFRESTIMATED  46*   GFRESTBLACK  55*     Recent Labs   Lab Test  07/09/18   1351   CR  1.19*          Passed - Patient does NOT have a diagnosis of CHF.       Passed - Patient is not pregnant       Passed - Patient has not had a positive pregnancy test within the past 12 mos.        Passed - Recent (6 mo) or future (30 days) visit within the authorizing provider's specialty    Patient had office visit in the last 6 months or has a visit in the next 30 days with authorizing provider or within the authorizing provider's specialty.  See \"Patient Info\" tab in inbasket, or \"Choose Columns\" " in Meds & Orders section of the refill encounter.

## 2018-07-29 NOTE — TELEPHONE ENCOUNTER
Reason for Disposition    Urinating more frequently than usual (i.e., frequency)    Additional Information    Negative: Shock suspected (e.g., cold/pale/clammy skin, too weak to stand, low BP, rapid pulse)    Negative: Sounds like a life-threatening emergency to the triager    Negative: [1] Unable to urinate (or only a few drops) > 4 hours AND     [2] bladder feels very full (e.g., palpable bladder or strong urge to urinate)    Negative: [1] Decreased urination and [2] drinking very little AND [2] dehydration suspected (e.g., dark urine, no urine > 12 hours, very dry mouth, very lightheaded)    Negative: Patient sounds very sick or weak to the triager    Negative: Fever > 100.5 F (38.1 C)    Negative: Side (flank) or lower back pain present    Negative: [1] Can't control passage of urine (i.e., urinary incontinence) AND [2] new onset (< 2 weeks) or worsening    Protocols used: URINARY SYMPTOMS-ADULT-

## 2018-07-29 NOTE — PROGRESS NOTES
"  SUBJECTIVE:   Hafsa Corona is a 63 year old female who presents to clinic today for the following health issues:    HPI     Presents today with increased urinary urgency and gross hematuria after \"aggressive sex:\" with her boyfriend on Friday.  She states she then went home and used her vibrator to climax a second time.  She feels it was the use of the vibrator that caused her current symptoms.    Patient is on Xarelto for LE DVT dxed first before dx of ovarian CA last year.    She denies any fever or flank pain.    Problem list and histories reviewed & adjusted, as indicated.  Additional history: as documented        Patient Active Problem List   Diagnosis     Attention deficit disorder     PANIC DISORDER      VASOMOTOR RHINITIS     Chronic Maxillary Sinusitis     Tachycardia     Type 2 diabetes mellitus without complication (H)     HYPERLIPIDEMIA LDL GOAL <100     Allergic rhinitis due to other allergen     BMI > 35     Essential hypertension     Lumbago     Major depressive disorder, recurrent episode, mild (H)     Long-term (current) use of anticoagulants [Z79.01]     Deep vein thrombosis (DVT) (H) [I82.409]     Pelvic mass in female     S/P laparotomy     Ovarian cancer, left (H)     Encounter for long-term (current) use of medications     Ovarian cancer (H)     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 also 1987 breast benign     BREAST SURGERY  1987    date unsure. benign breast cyst removed     CATARACT IOL, RT/LT Left 05/18/2018     CATARACT IOL, RT/LT Right 07/1318     COLONOSCOPY  2008 and 2013    polyps removed. Next due fall 2018 "     HYSTERECTOMY TOTAL ABDOMINAL, BILATERAL SALPINGO-OOPHORECTOMY, NODE DISSECTION, COMBINED Left 7/7/2017    Procedure: COMBINED HYSTERECTOMY TOTAL ABDOMINAL, SALPINGO-OOPHORECTOMY, NODE DISSECTION;  Exploratory Laparotomy, Left Salpingo-Oophorectomy, Cancer Staging, Total Hysterectomy, Omentectomy, Evacuation of abdominal fluid, Lymph Node Dissection  Anesthesia Block ;  Surgeon: Serena Cole MD;  Location: [facility] OR     HYSTERECTOMY, PAP NO LONGER INDICATED  07/07/2017    Laparotomy; for ovarian cancer staging     HYSTERECTOMY, PAP NO LONGER INDICATED       INSERT PORT VASCULAR ACCESS Right 8/21/2017    Procedure: INSERT PORT VASCULAR ACCESS;  Single Lumen Chest Power Port;  Surgeon: Stephen Mike PA-C;  Location: [facility] OR     LYMPHADENECTOMY RETROPERITONEAL Bilateral 07/07/2017    Laparotomy; pelvics & para-aortics; for ovarian cancer staging     OMENTECTOMY  07/07/2017    Laparotomy; for ovarian cancer staging     ORTHOPEDIC SURGERY  1/2002    broken left jaw due to fall, 4 fractures, titanium plates     PHACOEMULSIFICATION CLEAR CORNEA WITH STANDARD INTRAOCULAR LENS IMPLANT Right 7/13/2018    Procedure: PHACOEMULSIFICATION CLEAR CORNEA WITH STANDARD INTRAOCULAR LENS IMPLANT;  RIght Eye Phacoemulsification Clear Cornea with Standard Intraocular Lens Placement ;  Surgeon: Mary Jo Massey MD;  Location: [facility] OR     PHACOEMULSIFICATION CLEAR CORNEA WITH TORIC INTRAOCULAR LENS IMPLANT Left 5/18/2018    Procedure: PHACOEMULSIFICATION CLEAR CORNEA WITH TORIC INTRAOCULAR LENS IMPLANT;  Left Eye Phacoemulsification with Toric Lens;  Surgeon: Mary Jo Massey MD;  Location: [facility] OR     SALPINGO OOPHORECTOMY,R/L/KAYY Right 2008    Salpingo Oophorectomy, RT     SALPINGO OOPHORECTOMY,R/L/KAYY Left 07/07/2017    Laparotomy; for ovarian cancer staging     SURGICAL HISTORY OF -       facial surgery d/t fall in 1/2002     SURGICAL HISTORY OF -       1985 removal of breast cyst     SURGICAL HISTORY OF -   2008     endometrial ablation       Social History   Substance Use Topics     Smoking status: Never Smoker     Smokeless tobacco: Never Used     Alcohol use Yes      Comment: Very infrequent, a bit of wine or beer 2x per month maybe     Family History   Problem Relation Age of Onset     C.A.D. Father      Diabetes Father      Later in his life, in his 70s     Hypertension Father      Cerebrovascular Disease Father      1984 or      Psychotic Disorder Father      Pancreatic Cancer Father      Coronary Artery Disease Father      diagnosed in his late 50s (age)     Hyperlipidemia Father      treated w statins     Other Cancer Father      pancreatic, ,  of this     Depression Father      Mental Illness Father      likely PTSD and depression     Obesity Father      C.A.D. Mother      Hypertension Mother      Breast Cancer Mother      2 times,  and ?     Psychotic Disorder Mother      Colon Cancer Mother      ?     Cancer Mother      Bone cancer     Coronary Artery Disease Mother      diagnosed in her late 70s (age)     Hyperlipidemia Mother      treated w. statins     Other Cancer Mother      bone/marrow, ,  of this     Depression Mother      Anxiety Disorder Mother      Mental Illness Mother      various undiagnosed types, but THERE     Obesity Mother      Prostate Problems Brother      cleared      Hypertension Brother      Hyperlipidemia Brother      unsure if treated w statins     Depression Brother      Anxiety Disorder Brother      Mental Illness Brother      undiagnosed but THERE     Obesity Brother      Psychotic Disorder Sister      x2     Neurologic Disorder Sister      Hypertension Sister      Hyperlipidemia Sister      treated w statins     Depression Sister      Anxiety Disorder Sister      Obesity Sister      Psychotic Disorder Brother      x2     Depression Brother      major depressive disorder and OCD     Anxiety Disorder Brother      Mental Illness Brother      not sure of  diagnoses, treated     Hypertension Brother      Hyperlipidemia Brother      Psychotic Disorder Maternal Grandmother      ?     Coronary Artery Disease Maternal Grandmother       of heart attack age 84?     Depression Maternal Grandmother      Psychotic Disorder Maternal Grandfather      ?     Psychotic Disorder Paternal Grandmother      Schizophernia     Coronary Artery Disease Paternal Grandmother       of heart attack, age 85?     Depression Paternal Grandmother      severe, but recovered     Mental Illness Paternal Grandmother      possible bipolar or other     Psychotic Disorder Paternal Grandfather      ?     Respiratory Paternal Grandfather       of emphysema and smoking     Psychotic Disorder Sister      Other - See Comments Sister      small kidney stone      Hypertension Sister      Hyperlipidemia Sister      treated w statins     Depression Sister      Anxiety Disorder Sister      Cancer - colorectal No family hx of      Glaucoma No family hx of      Macular Degeneration No family hx of          Current Outpatient Prescriptions   Medication Sig Dispense Refill     atorvastatin (LIPITOR) 80 MG tablet TAKE 1 TABLET(80 MG) BY MOUTH DAILY 90 tablet 0     atorvastatin (LIPITOR) 80 MG tablet TAKE 1 TABLET(80 MG) BY MOUTH DAILY 90 tablet PRN     B Complex Vitamins (VITAMIN B-COMPLEX PO)        blood glucose (NO BRAND SPECIFIED) lancets standard Use to test blood sugar 2 times daily or as directed. 100 each 11     blood glucose monitoring (NO BRAND SPECIFIED) meter device kit Use to test blood sugar 2 times daily or as directed. 1 kit 0     blood glucose monitoring (NO BRAND SPECIFIED) test strip Use to test blood sugars 2 times daily or as directed 100 strip PRN     CALCIUM-MAGNESIUM-VITAMIN D PO Take 2 tablets by mouth daily       cephALEXin (KEFLEX) 500 MG capsule Take 1 capsule (500 mg) by mouth 3 times daily for 7 days 21 capsule 0     Cholecalciferol (VITAMIN D) 1000 UNIT capsule Take 2,000  "Units by mouth daily        EPINEPHrine (EPIPEN/ADRENACLICK/OR ANY BX GENERIC EQUIV) 0.3 MG/0.3ML injection 2-pack Inject 0.3 mLs (0.3 mg) into the muscle once as needed for anaphylaxis 0.3 mL PRN     escitalopram (LEXAPRO) 20 MG tablet Take 1 tablet (20 mg) by mouth daily 30 tablet PRN     fluticasone (FLONASE) 50 MCG/ACT spray SHAKE WELL AND USE 2 SPRAYS IN EACH NOSTRIL DAILY 48 mL PRN     lisinopril (PRINIVIL/ZESTRIL) 10 MG tablet Take 1 tablet (10 mg) by mouth daily 90 tablet PRN     loratadine (CLARITIN) 10 MG tablet Take 1 tablet (10 mg) by mouth daily (Patient taking differently: Take 10 mg by mouth as needed ) 30 tablet 1     LORazepam (ATIVAN) 1 MG tablet Take 1 tablet (1 mg) by mouth every 6 hours as needed (nausea/vomiting, anxiety or sleep) 30 tablet 1     Magnesium Hydroxide (MAGNESIA PO) Take 500 mg by mouth       metFORMIN (GLUCOPHAGE-XR) 500 MG 24 hr tablet TAKE 2 TABLETS BY MOUTH ONCE DAILY WITH EVENING MEAL 180 tablet PRN     Multiple Vitamins-Minerals (MULTIVITAMIN ADULT PO) Take 1 tablet by mouth daily       ofloxacin (OCUFLOX) 0.3 % ophthalmic solution 1 drop 4x daily in the surgical eye for 1 week after surgery, then stop 1 Bottle 0     ofloxacin (OCUFLOX) 0.3 % ophthalmic solution 1 drop 4x daily in the surgical eye for 1 week after surgery, then stop 1 Bottle 0     order for DME Home blood pressure monitor 1 Units 0     order for DME STERILE ADHESIVE PAD BANDAGE AT LEAST 4X4 INCH IN SIZE NEEDED FOR DAILY WOUND CARE TO LEFT LOWER LEG.  FAX TO [employer] [phone] 30 pad 1     order for DME USE 1/4 INCH  WIDTH PLAIN NU GAUZE PACKING TO DO SELF WOUND CARE OF LEFT LOWER LEG ONCE DAILY.  DIMENSIONS OF WOUND PRESENTLY ARE 2 X 2 INCH AND APPROXIMATELY 1/2 INCH DEEP.  USES 6 INCHES OF PACKING CURRENTLY FOR DRESSING CHANGE.    FAX TO [employer] [phone] 3 Bottle 2     order for DME Plain packing strip 1/4\" 1 Bottle PRN     order for DME Sterile zoey tipped applicators 1 Box PRN     " order for DME Compression stockings 20-30 mm Hg. To be wear when directed by Hematology team and while awake for the first two years post clot. 1 each 0     order for DME Equipment being ordered: blood pressure monitor 1 Units 0     phenazopyridine (AZO) 97.5 MG tablet Take 1 tablet (97.5 mg) by mouth 3 times daily for 3 days 9 tablet 0     prednisoLONE acetate (PRED FORTE) 1 % ophthalmic susp 1 drop in surgical eye as directed, 4x daily after surgery for 1 week, 3x daily for 1 week, 2x daily for 1 week, daily for 1 week, then stop 1 Bottle 1     prednisoLONE acetate (PRED FORTE) 1 % ophthalmic susp 1 drop in surgical eye as directed, 4x daily after surgery for 1 week, 3x daily for 1 week, 2x daily for 1 week, daily for 1 week, then stop 1 Bottle 1     Probiotic Product (PRO-BIOTIC BLEND PO) Take 1 capsule by mouth daily Enzymatic Therapy-probiotic pearls       rivaroxaban ANTICOAGULANT (XARELTO) 20 MG TABS tablet Take 1 tablet (20 mg) by mouth daily (with dinner) 30 tablet 3     Tetrahydrozoline HCl (EYE DROPS OP) OTC saline eye drops       traZODone (DESYREL) 50 MG tablet TAKE 1 TO 2 TABLETS(50  MG) BY MOUTH EVERY NIGHT AS NEEDED FOR SLEEP 180 tablet 1     Allergies   Allergen Reactions     Paclitaxel Anaphylaxis and Difficulty breathing     Wasps [Hornets] Anaphylaxis     Yellow Hornet Venom [Hornet Venom] Anaphylaxis and Swelling     Yellow Jacket Venom [Venomil Honey Bee] Anaphylaxis and Swelling     No Clinical Screening - See Comments      Taxol      Sulfa Drugs Hives     BP Readings from Last 3 Encounters:   07/29/18 132/84   07/13/18 104/66   07/09/18 114/75    Wt Readings from Last 3 Encounters:   07/29/18 200 lb (90.7 kg)   07/13/18 199 lb 9.6 oz (90.5 kg)   07/09/18 199 lb 6 oz (90.4 kg)                    ROS:  Constitutional, HEENT, cardiovascular, pulmonary, gi and gu systems are negative, except as otherwise noted.    OBJECTIVE:     /84  Pulse 59  Temp 97.8  F (36.6  C) (Tympanic)  Wt  200 lb (90.7 kg)  [facility] 01/01/2009  SpO2 98%  BMI 35.43 kg/m2  Body mass index is 35.43 kg/(m^2).  GENERAL: healthy, alert and no distress  EYES: Eyes grossly normal to inspection, PERRL and conjunctivae and sclerae normal  NECK: no adenopathy, no asymmetry, masses, or scars and thyroid normal to palpation  ABDOMEN: soft, nontender, no hepatosplenomegaly, no masses and bowel sounds normal  MS: no gross musculoskeletal defects noted, no edema  SKIN: no suspicious lesions or rashes  NEURO: Normal strength and tone, mentation intact and speech normal  PSYCH: mentation appears normal, affect normal/bright    Diagnostic Test Results:  Results for orders placed or performed in visit on 07/29/18 (from the past 24 hour(s))   *UA reflex to Microscopic and Culture ([facility] (except [place] and [place])   Result Value Ref Range    Color Urine Red     Appearance Urine Clear     Glucose Urine Negative NEG^Negative mg/dL    Bilirubin Urine Negative NEG^Negative    Ketones Urine Negative NEG^Negative mg/dL    Specific Gravity Urine 1.025 1.003 - 1.035    Blood Urine Large (A) NEG^Negative    pH Urine 7.0 5.0 - 7.0 pH    Protein Albumin Urine >=300 (A) NEG^Negative mg/dL    Urobilinogen Urine 0.2 0.2 - 1.0 EU/dL    Nitrite Urine Negative NEG^Negative    Leukocyte Esterase Urine Trace (A) NEG^Negative    Source Midstream Urine    Urine Microscopic   Result Value Ref Range    WBC Urine 0 - 5 OTO5^0 - 5 /HPF    RBC Urine >100 (A) OTO2^O - 2 /HPF    Bacteria Urine Moderate (A) NEG^Negative /HPF       ASSESSMENT/PLAN:     1. Urethritis    - *UA reflex to Microscopic and Culture ([facility] (except [place] and [place])  - Urine Microscopic  - phenazopyridine (AZO) 97.5 MG tablet; Take 1 tablet (97.5 mg) by mouth 3 times daily for 3 days  Dispense: 9 tablet; Refill: 0  - cephALEXin (KEFLEX) 500 MG capsule; Take 1 capsule (500 mg) by mouth 3 times daily for 7 days  Dispense: 21 capsule; Refill:  0    Advised there is no infection noted at this time but rather inflammation with irritation and bleeding from use of vibrator while on Xarelto.  Recommend cool compress to perineum and urethra in addition to diaper cream topically to help soothe area in addition to increase fluids to keep urine dilute and reduce overall irritation as urine passes through.    Rx for pyridium for comfort.  Advised will turn urine orange.    Patient request antibiotic if symptoms should worsen.  Keflex given which she has tolerated in past.    Close FU if no change or worsening symptoms as she is on Xarelto and had higher prolonged bleeding risk.  Patient voices understanding.    She has one kidney s/p unilateral nephrectomy for precancerous lesion-- she has been advised against use of NSAIDs and nephrotoxic Abx.    Diana Emmanuel MD  [facility]

## 2018-07-30 NOTE — PROGRESS NOTES
Outpatient Physical Therapy Discharge Note     Patient: Hafsa Corona  : 1955    Beginning/End Dates of Reporting Period:  2017 to 2017    Referring Provider: Blayne Reyes PA-C    Therapy Diagnosis: bilateral LE lymphedema          Outcome Measures (most recent score):  Lymphedema Life Impact Scale (score range 0-72). A higher score indicates greater impairment.: 39    Goals:  Goal Identifier HOME PROGRAM   Goal Description patient will demonstrate understanding of techniques to independently manage her lymphedema at home. home program to include: gradient compression bandaging, compression garments, lymphatic exercises, self manual lymphatic drainage, and skin care.    Target Date 10/29/17   Date Met      Progress:unable to assess     Goal Identifier VOLUMETRIC/CIRCUMFERENTIAL MEASUREMENTS   Goal Description volumetric/circumferential measurements will be reduced 5-10% to enable the patient to wear normal clothes/shoes, increase tolerance to activities, and prevent infections.    Target Date 10/29/17   Date Met      Progress:unable to assess     Goal Identifier LLIS   Goal Description LLIS score will show 8+ improvement in physical concerns, psychosocial concerns, and functional concerns.    Target Date 10/29/17   Date Met      Progress:unable to assess       Progress Toward Goals:   Progress this reporting period:  Patient seen for lymphedema eval recommending treatment, patient did not schedule treatments. Patient instructed in lymphatic exercises and modified self manual lymphatic drainage. discharge      Plan:  Discharge from therapy.    Discharge:    Reason for Discharge: Patient has failed to schedule further appointments.    Equipment Issued: none    Discharge Plan: Patient to continue home program. Lymphatic exercises and modified self manual lymphatic drainage.

## 2018-07-30 NOTE — ADDENDUM NOTE
Encounter addended by: Yesenia Camacho, PT on: 7/30/2018  8:57 AM<BR>     Actions taken: Episode resolved, Sign clinical note

## 2018-07-31 NOTE — PROGRESS NOTES
Chief Complaint:   Renal Mass           Consult or Referral:     Mr. Hafsa Corona is a 62 year old female seen in consultation from Dr. Shaffer         History of Present Illness:    Hafsa Corona is a very pleasant 62 year old female who presents with a history of a Renal Mass.  This was discovered incidentally in follow up for her ovarian cancer  Was actually visible on initial scan 7/2017, but now noticed on retroscpect .  Initially diagnosed about 1 month(s) ago.  The mass is solid and enhancing.  The maximal dimension of the renal tumor is 3.0 centimeters.  The mass is located in the Right side and is primarily located in the inter polar region and lower pole.  The tumor is also 50 % endophytic and exophytic.  The tumor primarily lies on the posterior and medial surface.  With regard to the collecting system, the edge of the tumor arrives either abuts the collecting system or arrives within 4 mm of the collecting system.      Concerning  her renal function, her last SCr is   Lab Results   Component Value Date    CR 0.71 11/14/2017     she has the following risk factors for development of chronic kidney disease , renal mass.    ECOG Performance Score: 0  0=Fully active, 1=Unable to do strenuous activity but capable of light work, 2=Ambulatory and capable of all selfcare, 3=Capable of limited selfcare, confined to bed >50% of waking hours, 4=Completely disabled.    Has an artistic sense and dreamed about two beasts crossing her path earlier this year.         Past Medical History:     Past Medical History:   Diagnosis Date     Anxiety state, unspecified     [id number]     Attention deficit disorder without mention of hyperactivity      Chronic rhinitis      Depressive disorder, not elsewhere classified      Panic disorder without agoraphobia      Pure hypercholesterolemia     Lipitor     Tachycardia, unspecified     9/1999-2/2004     Type II or unspecified type diabetes mellitus without mention of  S:  Chief Complaint   Patient presents with   • Forms Completion     patient states he needs a letter from Dr. Frazier to say he can leave the country       Patient comes in for the following problems:  1. Coronary artery disease involving native coronary artery of native heart without angina pectoris    2. Essential hypertension    3. Pure hypercholesterolemia    4. Psoriatic arthritis (CMS/HCC)    5. Immunocompromised state (CMS/HCC)    6. Herniated thoracic disc without myelopathy    7. Chronic coughing        Froylan Cross is a pleasant 52 year old male who presents today for a letter stating the medications that he is traveling with internationally. He may have to go to [facility] which is very strict on who and what kind of medication stating that in the country. See letter for details. Patient has history of coronary artery disease, HTN, HLD, psoriatic arthritis, immunocompromised state, pain right thoracic area posterior secondary to herniated disc and chronic coughing. Since he stopped the Protonix for his stomach he has had some difficulty with coughing and sometimes with swallowing almost feels like aspiration he said. He does not do anything about it today however if it continues I told him we would ge video swallow and possible speech eval.    I have reviewed the patient's medications and allergies, past medical, surgical, social and family history, updating these as appropriate.  See Histories section of the EMR for a display of this information.    MEDICATIONS:  Current Outpatient Prescriptions   Medication Sig Dispense Refill   • methotrexate 2.5 MG Tab Take 6 tablets by mouth 1 day a week. 72 tablet 3   • folic acid (FOLATE) 1 MG tablet Take 1 tablet by mouth daily. 90 tablet 3   • lidocaine (LIDODERM) 5 % patch Apply up to three patches once daily. Wear for only 12 out of 24 hours. 90 patch 2   • rosuvastatin (CRESTOR) 5 MG tablet Take 1 tablet by mouth daily. 90 tablet 3   • prasugrel (EFFIENT) 10 MG Tab  Take 1 tablet by mouth daily. 90 tablet 3   • nitroGLYcerin (NITROSTAT) 0.4 MG sublingual tablet Place 1 tablet under the tongue every 5 minutes as needed for Chest pain. 100 tablet 0   • metoPROLOL tartrate (LOPRESSOR) 25 MG tablet Take 1 tablet by mouth every 12 hours. 180 tablet 3   • aspirin 81 MG chewable tablet Chew 1 tablet by mouth daily. 30 tablet 11   • diclofenac (VOLTAREN) 1 % gel Apply topically 4 times daily. Apply to the affected area 3 or 4 times daily. 300 g 3   • Ubiquinol 300 MG Cap Take 300 mg by mouth daily.     • ketoconazole (NIZORAL) 2 % cream Apply topically daily.     • triamcinolone (ARISTOCORT) 0.1 % cream Apply topically daily.     • SAW PALMETTO, SERENOA REPENS, PO      • MARISELA ROOT PO      • Cyanocobalamin (B-12 PO) Take by mouth daily.     • Probiotic Product (PROBIOTIC DAILY PO) Take 1 tablet by mouth daily.     • Cholecalciferol (VITAMIN D) 1000 UNITS capsule Take 1,000 Units by mouth daily.     • InFLIXimab (REMICADE IV) Inject 4 mg into the vein. Every 8 weeks      • Ascorbic Acid (VITAMIN C) 1000 MG tablet Take 1 tablet by mouth daily.     • Cinnamon 500 MG CAPS Take 1,000 mg by mouth 2 times daily.     • Dispense (CHECK, UNKNOWN CONCENTRATION) Flax oral capsule     • Dispense (CHECK, UNKNOWN CONCENTRATION) Multienzyme     • Dispense (CHECK, UNKNOWN CONCENTRATION) Tumeric     • Dispense (CHECK, UNKNOWN CONCENTRATION) Omega 3 Polyunsaturated Fatty Acids (Fish Oil oral capsule)     • benzonatate (TESSALON PERLES) 200 MG capsule Take 1 capsule by mouth 3 times daily as needed for Cough. 90 capsule 3     No current facility-administered medications for this visit.        ALLERGIES:  ALLERGIES:   Allergen Reactions   • Sulfasalazine Other (See Comments)     Breathing Problems       ROS:  Constitutional:  Denies fever or chills   Eyes:  Denies change in visual acuity   HENT:  Denies nasal congestion or sore throat   Respiratory:   cough or shortness of breath   Cardiovascular:  Denies  complication, not stated as uncontrolled     diagnosed 2004            Past Surgical History:     Past Surgical History:   Procedure Laterality Date     HYSTERECTOMY TOTAL ABDOMINAL, BILATERAL SALPINGO-OOPHORECTOMY, NODE DISSECTION, COMBINED Left 7/7/2017    Procedure: COMBINED HYSTERECTOMY TOTAL ABDOMINAL, SALPINGO-OOPHORECTOMY, NODE DISSECTION;  Exploratory Laparotomy, Left Salpingo-Oophorectomy, Cancer Staging, Total Hysterectomy, Omentectomy, Evacuation of abdominal fluid, Lymph Node Dissection  Anesthesia Block ;  Surgeon: Serena Cole MD;  Location: [facility] OR     HYSTERECTOMY, PAP NO LONGER INDICATED  07/07/2017    Laparotomy; for ovarian cancer staging     INSERT PORT VASCULAR ACCESS Right 8/21/2017    Procedure: INSERT PORT VASCULAR ACCESS;  Single Lumen Chest Power Port;  Surgeon: Stephen Mike PA-C;  Location: [facility] OR     LYMPHADENECTOMY RETROPERITONEAL Bilateral 07/07/2017    Laparotomy; pelvics & para-aortics; for ovarian cancer staging     OMENTECTOMY  07/07/2017    Laparotomy; for ovarian cancer staging     SALPINGO OOPHORECTOMY,R/L/KAYY Right 2008    Salpingo Oophorectomy, RT     SALPINGO OOPHORECTOMY,R/L/KAYY Left 07/07/2017    Laparotomy; for ovarian cancer staging     SURGICAL HISTORY OF -       facial surgery d/t fall in 1/2002     SURGICAL HISTORY OF -       1985 removal of breast cyst     SURGICAL HISTORY OF -   2008    endometrial ablation            Medications     Current Outpatient Prescriptions   Medication     B Complex Vitamins (VITAMIN B-COMPLEX PO)     blood glucose (NO BRAND SPECIFIED) lancets standard     blood glucose monitoring (NO BRAND SPECIFIED) meter device kit     blood glucose monitoring (NO BRAND SPECIFIED) test strip     order for DME     L-GLUTAMINE PO     gabapentin (NEURONTIN) 100 MG capsule     fluticasone (FLONASE) 50 MCG/ACT spray     LORazepam (ATIVAN) 1 MG tablet     loratadine (CLARITIN) 10 MG tablet     order for DME     order for DME      chest pain or edema   GI:  Denies abdominal pain, nausea, vomiting, bloody stools or diarrhea   :  Denies dysuria   Musculoskeletal:  Denies back pain or joint pain   Integument:  Denies rash   Neurologic:  Denies headache, focal weakness or sensory changes   Endocrine:  Denies polyuria or polydipsia   Lymphatic:  Denies swollen glands   Psychiatric:  Denies depression or anxiety       O:  Visit Vitals  /60 (BP Location: [facility], Patient Position: Sitting, Cuff Size: Regular)   Pulse 70   Ht 5' 10\" (1.778 m) Comment: per patient   Wt 93.9 kg   BMI 29.70 kg/m²       Constitutional:  Well developed, well nourished, no acute distress, non-toxic appearance   Eyes:  PERRL, conjunctiva normal   HENT:  Atraumatic, external ears normal, nose normal, oropharynx moist, no pharyngeal exudates. Neck- normal range of motion, no tenderness, supple   Respiratory:  No respiratory distress, normal breath sounds, no rales, no wheezing   Cardiovascular:  Normal rate, normal rhythm, no murmurs, no gallops, no rubs   GI:  Soft, nondistended, normal bowel sounds, nontender, no organomegaly, no mass, no rebound, no guarding   :  No costovertebral angle tenderness   Musculoskeletal:  No edema, no tenderness, no deformities. Back- no tenderness  Integument:  Well hydrated, no rash   Lymphatic:  No lymphadenopathy noted   Neurologic:  Alert & oriented x 3, CN 2-12 normal, normal motor function, normal sensory function, no focal deficits noted   Psychiatric:  Speech and behavior appropriate         1. Coronary artery disease involving native coronary artery of native heart without angina pectoris    2. Essential hypertension    3. Pure hypercholesterolemia    4. Psoriatic arthritis (CMS/HCC)    5. Immunocompromised state (CMS/HCC)    6. Herniated thoracic disc without myelopathy    7. Chronic coughing      No orders of the defined types were placed in this encounter.       Return in about 4 weeks (around 8/28/2018), or if symptoms  prochlorperazine (COMPAZINE) 10 MG tablet     rivaroxaban ANTICOAGULANT (XARELTO) 20 MG TABS tablet     traZODone (DESYREL) 50 MG tablet     order for DME     order for DME     Multiple Vitamins-Minerals (MULTIVITAMIN ADULT PO)     Lisdexamfetamine Dimesylate (VYVANSE PO)     order for DME     CALCIUM-MAGNESIUM-VITAMIN D PO     Probiotic Product (PRO-BIOTIC BLEND PO)     atorvastatin (LIPITOR) 80 MG tablet     order for DME     lisinopril (PRINIVIL/ZESTRIL) 10 MG tablet     metFORMIN (GLUCOPHAGE-XR) 500 MG 24 hr tablet     escitalopram (LEXAPRO) 20 MG tablet     EPINEPHrine (EPIPEN) 0.3 MG/0.3ML injection     Cholecalciferol (VITAMIN D) 1000 UNIT capsule     No current facility-administered medications for this visit.             Family History:     Family History   Problem Relation Age of Onset     C.A.D. Father      DIABETES Father      Hypertension Father      CEREBROVASCULAR DISEASE Father      Psychotic Disorder Father      Pancreatic Cancer Father      C.A.D. Mother      Hypertension Mother      Breast Cancer Mother      Psychotic Disorder Mother      Colon Cancer Mother      CANCER Mother      Bone cancer     Prostate Problems Brother      cleared      Psychotic Disorder Sister      x2     Neurologic Disorder Sister      Psychotic Disorder Brother      x2     Depression Brother      major depressive disorder and OCD     Anxiety Disorder Brother      MENTAL ILLNESS Brother      Psychotic Disorder Maternal Grandmother      ?     Psychotic Disorder Maternal Grandfather      ?     Psychotic Disorder Paternal Grandmother      Schizophernia     Psychotic Disorder Paternal Grandfather      ?     Respiratory Paternal Grandfather       of emphysema and smoking     Psychotic Disorder Sister      Other - See Comments Sister      small kidney stone      Cancer - colorectal No family hx of             Social History:     Social History     Social History     Marital status: Single     Spouse name: N/A     Number of  "children: N/A     Years of education: N/A     Occupational History     Not on file.     Social History Main Topics     Smoking status: Never Smoker     Smokeless tobacco: Never Used     Alcohol use Yes      Comment: rarely     Drug use: No     Sexual activity: Yes     Birth control/ protection: None     Other Topics Concern     Not on file     Social History Narrative            Allergies:   Wasps [hornets]; No clinical screening - see comments; Paclitaxel; and Sulfa drugs         Review of Systems:  From intake questionnaire     Negative 14 system review except as noted on HPI, nurse's note.         Physical Exam:     Patient is a 62 year old  female   Vitals: Blood pressure 116/73, pulse 78, temperature 97.9  F (36.6  C), temperature source Tympanic, resp. rate 16, height 1.6 m (5' 2.99\"), weight 85.7 kg (189 lb), last menstrual period 01/01/2009, SpO2 96 %, not currently breastfeeding.  General Appearance Adult: Alert, no acute distress, oriented  HENT: throat/mouth:normal, good dentition  Lungs: no respiratory distress, or pursed lip breathing  Heart: No obvious jugular venous distension present  Abdomen: soft, nontender, no organomegaly or masses, Body mass index is 33.49 kg/(m^2)., scars noted Midline infraumbilical/periumbilical  Musculoskeltal: extremities normal, no peripheral edema  Skin: no suspicious lesions or rashes  Neuro: Alert, oriented, speech and mentation normal  Psych: affect and mood normal  Gait: Normal  : deferred      Labs and Pathology:    I reviewed all applicable laboratory and pathology data reviewed with patient  Significant for     Lab Results   Component Value Date    CR 0.71 11/14/2017    CR 0.67 10/16/2017    CR 0.71 09/25/2017    CR 0.72 08/25/2017    CR 0.78 08/16/2017    CR 1.00 07/11/2017    CR 1.17 07/10/2017    CR 1.08 07/08/2017    CR 1.14 07/07/2017    CR 0.88 07/03/2017           Imaging:    I reviewed all applicable imaging and went over the results with the " worsen or fail to improve.    Instructions provided as documented in the after visit summary.    The patient indicated understanding of the diagnosis and agreed with the plan of care.    Norman Frazier Jr, MD,      patient.  Significant for Renal mass noted in July and again in Nov.  Slight growth      Outside and Past Medical records:    I spent 10 minutes reviewing outside and past medical records.           Assessment and Plan:     Assessment:  Enhancing Mass Concerning for Renal Cell Cancer    Plan:  We had an extensive discussion about the significance of a localized, enhancing kidney mass.  Evaluation of the literature demonstrates that approximately 80% of enhancing renal masses turn out to be kidney cancer upon removal, while 20% are found to be benign.  Although certain features such as size, gender and tumor characteristics can change these risks.    We discussed the role of renal mass biopsy including the fact that renal mass biopsy is very safe with current techniques (risk of tumor seeding far below 1%).  Though renal mass biopsy is usually quite accurate 90-95% of the time, it can be inaccurate and it can be non diagnostic.  Furthermore, the majority of patients find they just need to proceed to surgery anyway.     We discussed the options for treatment of a localized kidney mass including active surveillance, thermal ablation, and surgical extirpation.  Surgical extirpation has the best track record and close to a 99% chance of cure for T1a lesions compared to 90% cure with thermal ablation and is generally preferred.  Furthermore, thermal ablation is not optimal for larger masses or centrally located masses.  Active surveillance is also a reasonable option when life expectancy is less than 5-7 years.    This tumor is in a challenging location and abuts the renal pelvis.  If it involved the renal pelvis, we may be unable to reconstruct the collecting system.  Most of the time there is a plane and I would estimate about 90% chance of partial nephrectomy success.    Furthermore, we discussed the relative merits of partial nephrectomy vs. radical nephrectomy and the theoretic basis behind maximal nephron  preservation.  There is some data suggesting there are long term survival advantages and equivalent cancer control with nephron sparing surgery.  We also discussed the advantages and disadvantages and roles of open surgery vs. laparoscopic (and Da Sherry assisted) surgery.    The anticipated post-operative course was explained, including an anticipated 1 night hospital stay.    Patient has decided she would like to proceed with Robotic Partial Nephrectomy - [id number].  She has an opinion with Dr. Dick, a good friend of mine next week.  She would like to schedule surgery but will get the second opinion which I agree with.    The risks, benefits, alternatives, and personnel involved in the procudure were discussed.  All questions were answered.  A written informed consent will be finalized on the morning of the procedure.      Orders  Orders Placed This Encounter   Procedures     [facility]-Operative Worksheet  (Urology General)     ABO/Rh Type and Screen       Needs h&p prior to surgery.      Javier Gregorio MD  Department of Urology Staff  [facility]        CC  Patient Care Team:  Morelia Ayon NP as PCP - General  Karla Oswald, RN as Continuity Care Coordinator (Gyn-Onc)      Copy to patient  MANA CHARLEE BRISENO  [address] [place] [address]

## 2018-08-01 RX ORDER — METFORMIN HCL 500 MG
TABLET, EXTENDED RELEASE 24 HR ORAL
Qty: 0.1 TABLET | Refills: 0 | OUTPATIENT
Start: 2018-08-01

## 2018-08-01 NOTE — TELEPHONE ENCOUNTER
Duplicate see script sent 5/10/2018 metFORMIN (GLUCOPHAGE-XR) 500 MG 24 hr tablet # 180 tablet with as needed refills x 1 year

## 2018-08-02 ENCOUNTER — HOSPITAL ENCOUNTER (OUTPATIENT)
Dept: PHYSICAL THERAPY | Facility: CLINIC | Age: 63
Setting detail: THERAPIES SERIES
End: 2018-08-02
Attending: NURSE PRACTITIONER
Payer: COMMERCIAL

## 2018-08-02 ENCOUNTER — HOSPITAL ENCOUNTER (OUTPATIENT)
Dept: OCCUPATIONAL THERAPY | Facility: CLINIC | Age: 63
Setting detail: THERAPIES SERIES
End: 2018-08-02
Attending: NURSE PRACTITIONER
Payer: COMMERCIAL

## 2018-08-02 ENCOUNTER — MYC MEDICAL ADVICE (OUTPATIENT)
Dept: FAMILY MEDICINE | Facility: CLINIC | Age: 63
End: 2018-08-02

## 2018-08-02 PROCEDURE — 97542 WHEELCHAIR MNGMENT TRAINING: CPT | Mod: GO | Performed by: OCCUPATIONAL THERAPIST

## 2018-08-02 PROCEDURE — 97110 THERAPEUTIC EXERCISES: CPT | Mod: GP | Performed by: PHYSICAL THERAPIST

## 2018-08-02 PROCEDURE — 40000361 ZZHC STATISTIC OT CANCER REHAB VISIT: Performed by: OCCUPATIONAL THERAPIST

## 2018-08-02 PROCEDURE — 40000360 ZZHC STATISTIC PT CANCER REHAB VISIT: Performed by: PHYSICAL THERAPIST

## 2018-08-02 PROCEDURE — 97535 SELF CARE MNGMENT TRAINING: CPT | Mod: GO | Performed by: OCCUPATIONAL THERAPIST

## 2018-08-03 ENCOUNTER — OFFICE VISIT (OUTPATIENT)
Dept: FAMILY MEDICINE | Facility: CLINIC | Age: 63
End: 2018-08-03
Payer: COMMERCIAL

## 2018-08-03 VITALS
DIASTOLIC BLOOD PRESSURE: 72 MMHG | RESPIRATION RATE: 16 BRPM | SYSTOLIC BLOOD PRESSURE: 112 MMHG | OXYGEN SATURATION: 96 % | BODY MASS INDEX: 35.61 KG/M2 | WEIGHT: 201 LBS | TEMPERATURE: 98 F | HEART RATE: 90 BPM

## 2018-08-03 DIAGNOSIS — R82.90 NONSPECIFIC FINDING ON EXAMINATION OF URINE: ICD-10-CM

## 2018-08-03 DIAGNOSIS — N39.0 URINARY TRACT INFECTION WITH HEMATURIA, SITE UNSPECIFIED: Primary | ICD-10-CM

## 2018-08-03 DIAGNOSIS — R31.9 URINARY TRACT INFECTION WITH HEMATURIA, SITE UNSPECIFIED: Primary | ICD-10-CM

## 2018-08-03 LAB
BACTERIA #/AREA URNS HPF: ABNORMAL /HPF
NON-SQ EPI CELLS #/AREA URNS LPF: ABNORMAL /LPF
RBC #/AREA URNS AUTO: ABNORMAL /HPF
URNS CMNT MICRO: ABNORMAL
WBC #/AREA URNS AUTO: ABNORMAL /HPF

## 2018-08-03 PROCEDURE — 87086 URINE CULTURE/COLONY COUNT: CPT | Performed by: FAMILY MEDICINE

## 2018-08-03 PROCEDURE — 81015 MICROSCOPIC EXAM OF URINE: CPT | Performed by: FAMILY MEDICINE

## 2018-08-03 PROCEDURE — 99213 OFFICE O/P EST LOW 20 MIN: CPT | Performed by: FAMILY MEDICINE

## 2018-08-03 PROCEDURE — 87186 SC STD MICRODIL/AGAR DIL: CPT | Performed by: FAMILY MEDICINE

## 2018-08-03 PROCEDURE — 87088 URINE BACTERIA CULTURE: CPT | Performed by: FAMILY MEDICINE

## 2018-08-03 NOTE — PROGRESS NOTES
SUBJECTIVE:   Hafsa Corona is a 63 year old female who presents to clinic today for the following health issues:    Follow up OV 7/29/18.    Seen in  7- after more aggressive intercourse followed by use of vibrator at home.  On Xarelto.  Initial UA did not show any infection- just RBCs.  Advised symptom management and close FU.    She has had more urgency and frequency over past week.  ASked to FU today for repeat UA.  No fever or chills or flank pain.    Problem list and histories reviewed & adjusted, as indicated.  Additional history: as documented    Patient Active Problem List   Diagnosis     Attention deficit disorder     PANIC DISORDER      VASOMOTOR RHINITIS     Chronic Maxillary Sinusitis     Tachycardia     Type 2 diabetes mellitus without complication (H)     HYPERLIPIDEMIA LDL GOAL <100     Allergic rhinitis due to other allergen     BMI > 35     Essential hypertension     Lumbago     Major depressive disorder, recurrent episode, mild (H)     Long-term (current) use of anticoagulants [Z79.01]     Deep vein thrombosis (DVT) (H) [I82.409]     Pelvic mass in female     S/P laparotomy     Ovarian cancer, left (H)     Encounter for long-term (current) use of medications     Ovarian cancer (H)     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 also 1987 breast benign     BREAST SURGERY  1987    date unsure. benign breast cyst removed     CATARACT IOL, RT/LT Left 05/18/2018     CATARACT IOL, RT/LT Right 07/1318     COLONOSCOPY  2008 and 2013    polyps removed. Next due fall 2018     HYSTERECTOMY TOTAL ABDOMINAL, BILATERAL  SALPINGO-OOPHORECTOMY, NODE DISSECTION, COMBINED Left 7/7/2017    Procedure: COMBINED HYSTERECTOMY TOTAL ABDOMINAL, SALPINGO-OOPHORECTOMY, NODE DISSECTION;  Exploratory Laparotomy, Left Salpingo-Oophorectomy, Cancer Staging, Total Hysterectomy, Omentectomy, Evacuation of abdominal fluid, Lymph Node Dissection  Anesthesia Block ;  Surgeon: Serena Cole MD;  Location: [facility] OR     HYSTERECTOMY, PAP NO LONGER INDICATED  07/07/2017    Laparotomy; for ovarian cancer staging     HYSTERECTOMY, PAP NO LONGER INDICATED       INSERT PORT VASCULAR ACCESS Right 8/21/2017    Procedure: INSERT PORT VASCULAR ACCESS;  Single Lumen Chest Power Port;  Surgeon: Stephen Mike PA-C;  Location: [facility] OR     LYMPHADENECTOMY RETROPERITONEAL Bilateral 07/07/2017    Laparotomy; pelvics & para-aortics; for ovarian cancer staging     OMENTECTOMY  07/07/2017    Laparotomy; for ovarian cancer staging     ORTHOPEDIC SURGERY  1/2002    broken left jaw due to fall, 4 fractures, titanium plates     PHACOEMULSIFICATION CLEAR CORNEA WITH STANDARD INTRAOCULAR LENS IMPLANT Right 7/13/2018    Procedure: PHACOEMULSIFICATION CLEAR CORNEA WITH STANDARD INTRAOCULAR LENS IMPLANT;  RIght Eye Phacoemulsification Clear Cornea with Standard Intraocular Lens Placement ;  Surgeon: Mary Jo Massey MD;  Location: [facility] OR     PHACOEMULSIFICATION CLEAR CORNEA WITH TORIC INTRAOCULAR LENS IMPLANT Left 5/18/2018    Procedure: PHACOEMULSIFICATION CLEAR CORNEA WITH TORIC INTRAOCULAR LENS IMPLANT;  Left Eye Phacoemulsification with Toric Lens;  Surgeon: Mary Jo Massey MD;  Location: [facility] OR     SALPINGO OOPHORECTOMY,R/L/KAYY Right 2008    Salpingo Oophorectomy, RT     SALPINGO OOPHORECTOMY,R/L/KAYY Left 07/07/2017    Laparotomy; for ovarian cancer staging     SURGICAL HISTORY OF -       facial surgery d/t fall in 1/2002     SURGICAL HISTORY OF -       1985 removal of breast cyst     SURGICAL HISTORY OF -   2008    endometrial ablation       Social History    Substance Use Topics     Smoking status: Never Smoker     Smokeless tobacco: Never Used     Alcohol use Yes      Comment: Very infrequent, a bit of wine or beer 2x per month maybe     Family History   Problem Relation Age of Onset     C.A.D. Father      Diabetes Father      Later in his life, in his 70s     Hypertension Father      Cerebrovascular Disease Father      1984 or      Psychotic Disorder Father      Pancreatic Cancer Father      Coronary Artery Disease Father      diagnosed in his late 50s (age)     Hyperlipidemia Father      treated w statins     Other Cancer Father      pancreatic, ,  of this     Depression Father      Mental Illness Father      likely PTSD and depression     Obesity Father      C.A.D. Mother      Hypertension Mother      Breast Cancer Mother      2 times,  and ?     Psychotic Disorder Mother      Colon Cancer Mother      ?     Cancer Mother      Bone cancer     Coronary Artery Disease Mother      diagnosed in her late 70s (age)     Hyperlipidemia Mother      treated w. statins     Other Cancer Mother      bone/marrow, ,  of this     Depression Mother      Anxiety Disorder Mother      Mental Illness Mother      various undiagnosed types, but THERE     Obesity Mother      Prostate Problems Brother      cleared      Hypertension Brother      Hyperlipidemia Brother      unsure if treated w statins     Depression Brother      Anxiety Disorder Brother      Mental Illness Brother      undiagnosed but THERE     Obesity Brother      Psychotic Disorder Sister      x2     Neurologic Disorder Sister      Hypertension Sister      Hyperlipidemia Sister      treated w statins     Depression Sister      Anxiety Disorder Sister      Obesity Sister      Psychotic Disorder Brother      x2     Depression Brother      major depressive disorder and OCD     Anxiety Disorder Brother      Mental Illness Brother      not sure of diagnoses, treated     Hypertension Brother       Hyperlipidemia Brother      Psychotic Disorder Maternal Grandmother      ?     Coronary Artery Disease Maternal Grandmother       of heart attack age 84?     Depression Maternal Grandmother      Psychotic Disorder Maternal Grandfather      ?     Psychotic Disorder Paternal Grandmother      Schizophernia     Coronary Artery Disease Paternal Grandmother       of heart attack, age 85?     Depression Paternal Grandmother      severe, but recovered     Mental Illness Paternal Grandmother      possible bipolar or other     Psychotic Disorder Paternal Grandfather      ?     Respiratory Paternal Grandfather       of emphysema and smoking     Psychotic Disorder Sister      Other - See Comments Sister      small kidney stone      Hypertension Sister      Hyperlipidemia Sister      treated w statins     Depression Sister      Anxiety Disorder Sister      Cancer - colorectal No family hx of      Glaucoma No family hx of      Macular Degeneration No family hx of          Current Outpatient Prescriptions   Medication Sig Dispense Refill     atorvastatin (LIPITOR) 80 MG tablet TAKE 1 TABLET(80 MG) BY MOUTH DAILY 90 tablet 0     atorvastatin (LIPITOR) 80 MG tablet TAKE 1 TABLET(80 MG) BY MOUTH DAILY 90 tablet PRN     B Complex Vitamins (VITAMIN B-COMPLEX PO)        blood glucose (NO BRAND SPECIFIED) lancets standard Use to test blood sugar 2 times daily or as directed. 100 each 11     blood glucose monitoring (NO BRAND SPECIFIED) meter device kit Use to test blood sugar 2 times daily or as directed. 1 kit 0     blood glucose monitoring (NO BRAND SPECIFIED) test strip Use to test blood sugars 2 times daily or as directed 100 strip PRN     CALCIUM-MAGNESIUM-VITAMIN D PO Take 2 tablets by mouth daily       cephALEXin (KEFLEX) 500 MG capsule Take 1 capsule (500 mg) by mouth 3 times daily for 7 days 21 capsule 0     Cholecalciferol (VITAMIN D) 1000 UNIT capsule Take 2,000 Units by mouth daily        EPINEPHrine  "(EPIPEN/ADRENACLICK/OR ANY BX GENERIC EQUIV) 0.3 MG/0.3ML injection 2-pack Inject 0.3 mLs (0.3 mg) into the muscle once as needed for anaphylaxis 0.3 mL PRN     escitalopram (LEXAPRO) 20 MG tablet Take 1 tablet (20 mg) by mouth daily 30 tablet PRN     fluticasone (FLONASE) 50 MCG/ACT spray SHAKE WELL AND USE 2 SPRAYS IN EACH NOSTRIL DAILY 48 mL PRN     lisinopril (PRINIVIL/ZESTRIL) 10 MG tablet Take 1 tablet (10 mg) by mouth daily 90 tablet PRN     loratadine (CLARITIN) 10 MG tablet Take 1 tablet (10 mg) by mouth daily (Patient taking differently: Take 10 mg by mouth as needed ) 30 tablet 1     LORazepam (ATIVAN) 1 MG tablet Take 1 tablet (1 mg) by mouth every 6 hours as needed (nausea/vomiting, anxiety or sleep) 30 tablet 1     Magnesium Hydroxide (MAGNESIA PO) Take 500 mg by mouth       metFORMIN (GLUCOPHAGE-XR) 500 MG 24 hr tablet TAKE 2 TABLETS BY MOUTH ONCE DAILY WITH EVENING MEAL 180 tablet PRN     Multiple Vitamins-Minerals (MULTIVITAMIN ADULT PO) Take 1 tablet by mouth daily       ofloxacin (OCUFLOX) 0.3 % ophthalmic solution 1 drop 4x daily in the surgical eye for 1 week after surgery, then stop 1 Bottle 0     ofloxacin (OCUFLOX) 0.3 % ophthalmic solution 1 drop 4x daily in the surgical eye for 1 week after surgery, then stop 1 Bottle 0     order for DME Home blood pressure monitor 1 Units 0     order for DME STERILE ADHESIVE PAD BANDAGE AT LEAST 4X4 INCH IN SIZE NEEDED FOR DAILY WOUND CARE TO LEFT LOWER LEG.  FAX TO [facility] -[phone] 30 pad 1     order for DME USE 1/4 INCH  WIDTH PLAIN NU GAUZE PACKING TO DO SELF WOUND CARE OF LEFT LOWER LEG ONCE DAILY.  DIMENSIONS OF WOUND PRESENTLY ARE 2 X 2 INCH AND APPROXIMATELY 1/2 INCH DEEP.  USES 6 INCHES OF PACKING CURRENTLY FOR DRESSING CHANGE.    FAX TO [facility] -[phone] 3 Bottle 2     order for DME Plain packing strip 1/4\" 1 Bottle PRN     order for DME Sterile zoey tipped applicators 1 Box PRN     order for DME Compression stockings " 20-30 mm Hg. To be wear when directed by Hematology team and while awake for the first two years post clot. 1 each 0     order for DME Equipment being ordered: blood pressure monitor 1 Units 0     prednisoLONE acetate (PRED FORTE) 1 % ophthalmic susp 1 drop in surgical eye as directed, 4x daily after surgery for 1 week, 3x daily for 1 week, 2x daily for 1 week, daily for 1 week, then stop 1 Bottle 1     prednisoLONE acetate (PRED FORTE) 1 % ophthalmic susp 1 drop in surgical eye as directed, 4x daily after surgery for 1 week, 3x daily for 1 week, 2x daily for 1 week, daily for 1 week, then stop 1 Bottle 1     Probiotic Product (PRO-BIOTIC BLEND PO) Take 1 capsule by mouth daily Enzymatic Therapy-probiotic pearls       rivaroxaban ANTICOAGULANT (XARELTO) 20 MG TABS tablet Take 1 tablet (20 mg) by mouth daily (with dinner) 30 tablet 3     Tetrahydrozoline HCl (EYE DROPS OP) OTC saline eye drops       traZODone (DESYREL) 50 MG tablet TAKE 1 TO 2 TABLETS(50  MG) BY MOUTH EVERY NIGHT AS NEEDED FOR SLEEP 180 tablet 1     Allergies   Allergen Reactions     Paclitaxel Anaphylaxis and Difficulty breathing     Wasps [Hornets] Anaphylaxis     Yellow Hornet Venom [Hornet Venom] Anaphylaxis and Swelling     Yellow Jacket Venom [Venomil Honey Bee] Anaphylaxis and Swelling     No Clinical Screening - See Comments      Taxol      Sulfa Drugs Hives     BP Readings from Last 3 Encounters:   08/03/18 112/72   07/29/18 132/84   07/13/18 104/66    Wt Readings from Last 3 Encounters:   08/03/18 201 lb (91.2 kg)   07/29/18 200 lb (90.7 kg)   07/13/18 199 lb 9.6 oz (90.5 kg)                    Reviewed and updated as needed this visit by clinical staff  Tobacco  Allergies  Med Hx  Surg Hx  Fam Hx  Soc Hx      Reviewed and updated as needed this visit by Provider         ROS:  Constitutional, HEENT, cardiovascular, pulmonary, gi and gu systems are negative, except as otherwise noted.    OBJECTIVE:     /72  Pulse 90  Temp  98  F (36.7  C) (Oral)  Resp 16  Wt 201 lb (91.2 kg)  LMP 01/01/2009  SpO2 96%  Breastfeeding? No  BMI 35.61 kg/m2  Body mass index is 35.61 kg/(m^2).  GENERAL: healthy, alert and no distress  EYES: Eyes grossly normal to inspection, PERRL and conjunctivae and sclerae normal  NECK: no adenopathy, no asymmetry, masses, or scars and thyroid normal to palpation  MS: no gross musculoskeletal defects noted, no edema  SKIN: no suspicious lesions or rashes  BACK: no CVA tenderness, no paralumbar tenderness  PSYCH: mentation appears normal, affect normal/bright    Diagnostic Test Results:  Results for orders placed or performed in visit on 08/03/18   Urine Microscopic   Result Value Ref Range    WBC Urine  (A) OTO5^0 - 5 /HPF    RBC Urine  (A) OTO2^O - 2 /HPF    Squamous Epithelial /LPF Urine Few FEW^Few /LPF    Bacteria Urine Few (A) NEG^Negative /HPF    Comment Urine Interfering substances, dipstick not done    Urine Culture Aerobic Bacterial   Result Value Ref Range    Specimen Description Midstream Urine     Culture Micro (A)      50,000 to 100,000 colonies/mL  Escherichia coli  Susceptibility testing in progress         ASSESSMENT/PLAN:     1. Urinary tract infection with hematuria, site unspecified    - Urine Microscopic    Patient already had Keflex from UC visit-- will start this and FU for repeat UA/UC following treatment to ensure infection and bleeding has cleared.  Continue with increased fluids and close FU if symptoms change or worsen.    No vibrator use while on Xarelto.    2. Nonspecific finding on examination of urine    - Urine Culture Aerobic Bacterial    UC and sensitivities pending.    Diana Emmanuel MD  [facility]

## 2018-08-03 NOTE — MR AVS SNAPSHOT
After Visit Summary   8/3/2018    Hafsa Corona    MRN: [id number]           Patient Information     Date Of Birth          1955        Visit Information        Provider Department      8/3/2018 3:40 PM Diana Emmanuel MD [facility]        Today's Diagnoses     Urinary tract infection with hematuria, site unspecified    -  1    Nonspecific finding on examination of urine           Follow-ups after your visit        Follow-up notes from your care team     Return if symptoms worsen or fail to improve.      Your next 10 appointments already scheduled     Aug 06, 2018 10:30 AM CDT   Post-Op with Mary Jo Massey MD   Eye Clinic ([facility])    [address]  [facility] [address]   [phone]            Aug 09, 2018 10:15 AM CDT   Cancer Rehab Treatment with Lina Cheung, PT   [facility] [place], Physical Therapy - Outpatient ([facility])    [address]  [place] [address]   [phone]            Aug 10, 2018  1:00 PM CDT   Cancer Rehab Treatment with Chelsi Turner, OT   [facility] [place], Occupational Therapy - Outpatient ([facility])    [address]  [place] [address]   [phone]            Aug 15, 2018  1:00 PM CDT   Cancer Rehab Treatment with Chelsi Turner, OT   [facility] [place], Occupational Therapy - Outpatient ([facility])    [address]  [place] [address]   [phone]            Aug 15, 2018  2:15 PM CDT   Cancer Rehab Treatment with Leena Albright, PT   [facility] [place], Physical Therapy - Outpatient ([facility])    [address]  [place] [address]   [phone]            Aug 22, 2018  2:15 PM CDT   Cancer Rehab  "Treatment with Leena Albright, PT   [facility] [place], Physical Therapy - Outpatient ([facility])    [address]  [place] [address]   [phone]            Aug 22, 2018  3:00 PM CDT   Cancer Rehab Treatment with Chelsi Turner, OT   [facility], [place], Occupational Therapy - Outpatient ([facility])    [address]  [place] [address]   [phone]            Aug 24, 2018  2:00 PM CDT   (Arrive by 1:45 PM)   Return Visit with MARLON Ogden [facility] ([facility])    [address]  [address]  [place] [address]   [phone]            Sep 21, 2018 12:00 PM CDT   (Arrive by 11:45 AM)   Return Visit with MARLON Chiu   [facility] ([facility])    [address]  [address]  [place] [address]   [phone]            Sep 21, 2018 12:30 PM CDT   MA SCREENING BILATERAL W/ THAIS with [email or web address]   [facility] ([facility])    [address]  [place] [address]   [phone]           Three-dimensional (3D) mammograms are available at [place] locations in [facility], [place], [place], [facility], [place], [place], and [place]. [facility] locations include [place] [facility] in [place]. Benefits of 3D mammograms include: - Improved rate of cancer detection - Decreases your chance of having to go back for more tests, which means fewer: - \"False-positive\" results (This means that there is an abnormal area but it isn't cancer.) - Invasive testing procedures, such as a biopsy or surgery - Can provide clearer images of the breast if you have dense breast tissue. 3D mammography is an optional exam that anyone can have with a 2D mammogram. It " doesn't replace or take the place of a 2D mammogram. 2D mammograms remain an effective screening test for all women.  Not all insurance companies cover the cost of a 3D mammogram. Check with your insurance.              Who to contact     If you have questions or need follow up information about today's clinic visit or your schedule please contact [facility] directly at [phone].  Normal or non-critical lab and imaging results will be communicated to you by Telecardiahart, letter or phone within 4 business days after the clinic has received the results. If you do not hear from us within 7 days, please contact the clinic through Zapoint or phone. If you have a critical or abnormal lab result, we will notify you by phone as soon as possible.  Submit refill requests through Zapoint or call your pharmacy and they will forward the refill request to us. Please allow 3 business days for your refill to be completed.          Additional Information About Your Visit        TelecardiaharPillGuard Information     Zapoint gives you secure access to your electronic health record. If you see a primary care provider, you can also send messages to your care team and make appointments. If you have questions, please call your primary care clinic.  If you do not have a primary care provider, please call [phone] and they will assist you.        Care EveryWhere ID     This is your Care EveryWhere ID. This could be used by other organizations to access your [place] medical records  [id number]        Your Vitals Were     Pulse Temperature Respirations Last Period Pulse Oximetry Breastfeeding?    90 98  F (36.7  C) (Oral) 16 01/01/2009 96% No    BMI (Body Mass Index)                   35.61 kg/m2            Blood Pressure from Last 3 Encounters:   08/03/18 112/72   07/29/18 132/84   07/13/18 104/66    Weight from Last 3 Encounters:   08/03/18 201 lb (91.2 kg)   07/29/18 200 lb (90.7 kg)   07/13/18 199 lb 9.6 oz (90.5 kg)               We Performed the Following     Urine Culture Aerobic Bacterial     Urine Microscopic          Today's Medication Changes          These changes are accurate as of 8/3/18 11:59 PM.  If you have any questions, ask your nurse or doctor.               These medicines have changed or have updated prescriptions.        Dose/Directions    loratadine 10 MG tablet   Commonly known as:  CLARITIN   This may have changed:    - when to take this  - reasons to take this   Used for:  Pain in joint, multiple sites        Dose:  10 mg   Take 1 tablet (10 mg) by mouth daily   Quantity:  30 tablet   Refills:  1                Primary Care Provider Office Phone # Fax #    [place] JAVIER Ayon [phone]       [address] [facility] [address]        Equal Access to Services     RONEY ARANDA : Ward tellez Soaxel, waaxda bereniceqadaha, qaybta kaalmada adewoodyafrancesca, sujatha betts . So [facility] [phone].    ATENCIÓN: Si habla español, tiene a martínez disposición servicios gratuitos de asistencia lingüística. Llame al [phone].    We comply with applicable federal civil rights laws and [place] laws. We do not discriminate on the basis of race, color, national origin, age, disability, sex, sexual orientation, or gender identity.            Thank you!     Thank you for choosing [facility]  for your care. Our goal is always to provide you with excellent care. Hearing back from our patients is one way we can continue to improve our services. Please take a few minutes to complete the written survey that you may receive in the mail after your visit with us. Thank you!             Your Updated Medication List - Protect others around you: Learn how to safely use, store and throw away your medicines at www.disposemymeds.org.          This list is accurate as of 8/3/18 11:59 PM.  Always use your most recent med list.                   Brand Name Dispense Instructions for use  Diagnosis    * atorvastatin 80 MG tablet    LIPITOR    90 tablet    TAKE 1 TABLET(80 MG) BY MOUTH DAILY    Hyperlipidemia LDL goal <100       * atorvastatin 80 MG tablet    LIPITOR    90 tablet    TAKE 1 TABLET(80 MG) BY MOUTH DAILY    Hyperlipidemia LDL goal <100       blood glucose lancets standard    no brand specified    100 each    Use to test blood sugar 2 times daily or as directed.    Type 2 diabetes mellitus without complication, without long-term current use of insulin (H)       blood glucose monitoring meter device kit    no brand specified    1 kit    Use to test blood sugar 2 times daily or as directed.    Type 2 diabetes mellitus without complication, without long-term current use of insulin (H)       blood glucose monitoring test strip    no brand specified    100 strip    Use to test blood sugars 2 times daily or as directed    Type 2 diabetes mellitus without complication, without long-term current use of insulin (H)       CALCIUM-MAGNESIUM-VITAMIN D PO      Take 2 tablets by mouth daily        cephALEXin 500 MG capsule    KEFLEX    21 capsule    Take 1 capsule (500 mg) by mouth 3 times daily for 7 days    Urethritis       EPINEPHrine 0.3 MG/0.3ML injection 2-pack    EPIPEN/ADRENACLICK/or ANY BX GENERIC EQUIV    0.3 mL    Inject 0.3 mLs (0.3 mg) into the muscle once as needed for anaphylaxis    Bee allergy status       escitalopram 20 MG tablet    LEXAPRO    30 tablet    Take 1 tablet (20 mg) by mouth daily    Major depressive disorder, recurrent episode, mild (H)       EYE DROPS OP      OTC saline eye drops        fluticasone 50 MCG/ACT spray    FLONASE    48 mL    SHAKE WELL AND USE 2 SPRAYS IN EACH NOSTRIL DAILY    Chronic maxillary sinusitis       lisinopril 10 MG tablet    PRINIVIL/ZESTRIL    90 tablet    Take 1 tablet (10 mg) by mouth daily    Essential hypertension       loratadine 10 MG tablet    CLARITIN    30 tablet    Take 1 tablet (10 mg) by mouth daily    Pain in joint, multiple sites  "      LORazepam 1 MG tablet    ATIVAN    30 tablet    Take 1 tablet (1 mg) by mouth every 6 hours as needed (nausea/vomiting, anxiety or sleep)    Ovarian cancer, left (H), Encounter for long-term (current) use of medications       MAGNESIA PO      Take 500 mg by mouth        metFORMIN 500 MG 24 hr tablet    GLUCOPHAGE-XR    180 tablet    TAKE 2 TABLETS BY MOUTH ONCE DAILY WITH EVENING MEAL    Type 2 diabetes mellitus without complication, without long-term current use of insulin (H)       MULTIVITAMIN ADULT PO      Take 1 tablet by mouth daily        * ofloxacin 0.3 % ophthalmic solution    OCUFLOX    1 Bottle    1 drop 4x daily in the surgical eye for 1 week after surgery, then stop    Nuclear sclerotic cataract, left       * ofloxacin 0.3 % ophthalmic solution    OCUFLOX    1 Bottle    1 drop 4x daily in the surgical eye for 1 week after surgery, then stop    Nuclear sclerotic cataract, right       order for DME     1 Units    Equipment being ordered: blood pressure monitor    Essential hypertension       * order for DME     1 each    Compression stockings 20-30 mm Hg. To be wear when directed by Hematology team and while awake for the first two years post clot.    Long-term (current) use of anticoagulants, Acute deep vein thrombosis (DVT) of left lower extremity, unspecified vein (H)       * order for DME     1 Bottle    Plain packing strip 1/4\"    Abscess of leg       * order for DME     1 Box    Sterile zoey tipped applicators    Abscess of leg       * order for DME     3 Bottle    USE 1/4 INCH  WIDTH PLAIN NU GAUZE PACKING TO DO SELF WOUND CARE OF LEFT LOWER LEG ONCE DAILY. DIMENSIONS OF WOUND PRESENTLY ARE 2 X 2 INCH AND APPROXIMATELY 1/2 INCH DEEP.  USES 6 INCHES OF PACKING CURRENTLY FOR DRESSING CHANGE.  FAX TO [employer] [phone]    Wound of left leg       * order for DME     30 pad    STERILE ADHESIVE PAD BANDAGE AT LEAST 4X4 INCH IN SIZE NEEDED FOR DAILY WOUND CARE TO LEFT LOWER LEG. FAX TO " [facility] -[phone]    Leg wound, left       * order for DME     1 Units    Home blood pressure monitor    Type 2 diabetes mellitus without complication, without long-term current use of insulin (H)       * prednisoLONE acetate 1 % ophthalmic susp    PRED FORTE    1 Bottle    1 drop in surgical eye as directed, 4x daily after surgery for 1 week, 3x daily for 1 week, 2x daily for 1 week, daily for 1 week, then stop    Nuclear sclerotic cataract, left       * prednisoLONE acetate 1 % ophthalmic susp    PRED FORTE    1 Bottle    1 drop in surgical eye as directed, 4x daily after surgery for 1 week, 3x daily for 1 week, 2x daily for 1 week, daily for 1 week, then stop    Nuclear sclerotic cataract, right       PRO-BIOTIC BLEND PO      Take 1 capsule by mouth daily Enzymatic Therapy-probiotic pearls        rivaroxaban ANTICOAGULANT 20 MG Tabs tablet    XARELTO    30 tablet    Take 1 tablet (20 mg) by mouth daily (with dinner)    Long-term (current) use of anticoagulants, Acute deep vein thrombosis (DVT) of left lower extremity, unspecified vein (H)       traZODone 50 MG tablet    DESYREL    180 tablet    TAKE 1 TO 2 TABLETS(50  MG) BY MOUTH EVERY NIGHT AS NEEDED FOR SLEEP    Insomnia, unspecified type       VITAMIN B-COMPLEX PO           vitamin D 1000 units capsule      Take 2,000 Units by mouth daily        * Notice:  This list has 12 medication(s) that are the same as other medications prescribed for you. Read the directions carefully, and ask your doctor or other care provider to review them with you.

## 2018-08-05 LAB
BACTERIA SPEC CULT: ABNORMAL
SPECIMEN SOURCE: ABNORMAL

## 2018-08-06 ENCOUNTER — OFFICE VISIT (OUTPATIENT)
Dept: OPHTHALMOLOGY | Facility: CLINIC | Age: 63
End: 2018-08-06
Attending: OPHTHALMOLOGY
Payer: COMMERCIAL

## 2018-08-06 DIAGNOSIS — Z96.1 PSEUDOPHAKIA, BOTH EYES: Primary | ICD-10-CM

## 2018-08-06 DIAGNOSIS — H26.491 POSTERIOR CAPSULAR OPACIFICATION, RIGHT: ICD-10-CM

## 2018-08-06 PROCEDURE — G0463 HOSPITAL OUTPT CLINIC VISIT: HCPCS | Mod: ZF

## 2018-08-06 ASSESSMENT — REFRACTION_MANIFEST
OD_SPHERE: -1.25
OD_ADD: +2.50
OD_AXIS: 070
OD_CYLINDER: +0.50

## 2018-08-06 ASSESSMENT — CUP TO DISC RATIO
OS_RATIO: 0.3
OD_RATIO: 0.3

## 2018-08-06 ASSESSMENT — TONOMETRY
OD_IOP_MMHG: 12
OS_IOP_MMHG: 12
IOP_METHOD: TONOPEN

## 2018-08-06 ASSESSMENT — VISUAL ACUITY
OS_SC: 20/25
OS_SC+: -2
OD_SC+: +3
METHOD: SNELLEN - LINEAR
OD_SC: 20/25

## 2018-08-06 ASSESSMENT — EXTERNAL EXAM - RIGHT EYE: OD_EXAM: NORMAL

## 2018-08-06 ASSESSMENT — CONF VISUAL FIELD
OS_NORMAL: 1
OD_NORMAL: 1

## 2018-08-06 ASSESSMENT — SLIT LAMP EXAM - LIDS
COMMENTS: NORMAL
COMMENTS: NORMAL

## 2018-08-06 NOTE — NURSING NOTE
Chief Complaints and History of Present Illnesses   Patient presents with     Follow Up For     3-4 wk Post op Cataract      HPI    Affected eye(s):  Right   Symptoms:     No blurred vision   No floaters   Flashes   No tearing   No itching      Duration:  3 weeks   Frequency:  Constant       Do you have eye pain now?:  No      Comments:  Patient presents for follow up post cataract surgery RE 3 weeks ago. Since the last visit, the vision has improved. She notes that she can see the differences between the IOLs in each eye. No pain or discomfort BE, 1 episode of flashes of color no floaters. She has been compliant with her drops schedule, now tapered 1 drop daily. No longer using the eye shield. Ruma Vela COT 11:11 AM August 6, 2018

## 2018-08-06 NOTE — PROGRESS NOTES
HPI  Hafsa Corona is a 63 year old female here for follow-up after CE/IOL now both eyes. She feels the vision is clear and the eye comfortable. She notes a slight difference between the two eyes.     POH:   PMH: S/p kidney surgery with one functional kidney, diabetes.    Assessment & Plan    (Z96.1) Pseudophakia, both eyes  (primary encounter diagnosis)  (H26.491) Posterior capsular opacification, right  Comment: Excellent outcome. She notes mild blurriness of the right eye compared to the left eye. There is a wrinkle in the inferior posterior capsule right eye that may be contributing.  Plan: Complete drop taper. She is doing well without correction. Given updated glasses Rx for her records or for readers if needed. Discussed r/b/a of YAG cap right eye to see if this helps to even her vision. She would like to proceed. Recommend waiting at least 3 months after CE/IOL.    -----------------------------------------------------------------------------------    Patient disposition:   Return in about 3 months (around 11/6/2018) for YAG capsulotomy right eye. or sooner as needed.    Teaching statement:  Complete documentation of historical and exam elements from today's encounter can be found in the full encounter summary report (not reduplicated in this progress note). I personally obtained the chief complaint(s) and history of present illness.  I confirmed and edited as necessary the review of systems, past medical/surgical history, family history, social history, and examination findings as documented by others; and I examined the patient myself. I personally reviewed the relevant tests, images, and reports as documented above.     I formulated and edited as necessary the assessment and plan and discussed the findings and management plan with the patient and family.    Mary Jo Massey MD  Comprehensive Ophthalmology & Ocular Pathology  Department of Ophthalmology and Visual Neurosciences  [email or web address]  Pager  [phone]

## 2018-08-06 NOTE — MR AVS SNAPSHOT
After Visit Summary   8/6/2018    Hafsa Corona    MRN: [id number]           Patient Information     Date Of Birth          1955        Visit Information        Provider Department      8/6/2018 10:30 AM Mary Jo Massey MD [facility]         Follow-ups after your visit        Your next 10 appointments already scheduled     Aug 09, 2018 10:15 AM CDT   Cancer Rehab Treatment with Lina Cheung, PT   [facility] ([facility]    [address], [address]  [place] [address]   [phone]            Aug 10, 2018  1:00 PM CDT   Cancer Rehab Treatment with Chelsi Turner, OT   [facility]    [address], [address]  [place] [address]   [phone]            Aug 15, 2018  1:00 PM CDT   Cancer Rehab Treatment with Chelsi Turner, OT   [facility]    [address]  [place] [address]   [phone]            Aug 15, 2018  2:15 PM CDT   Cancer Rehab Treatment with Leena Albright, PT   [facility]    [address]  [place] [address]   [phone]            Aug 22, 2018  2:15 PM CDT   Cancer Rehab Treatment with Leena Albright, PT   [facility]    [address]  [place] [address]   [phone]            Aug 22, 2018  3:00 PM CDT   Cancer Rehab Treatment with Chelsi Turner, OT   [facility]    [address], [address]  [place] [address]   [phone]     "        Aug 24, 2018  2:00 PM CDT   (Arrive by 1:45 PM)   Return Visit with MARLON Ogden CNP   [facility] ([facility])    [address]  [address]  [facility] [address]   [phone]            Sep 21, 2018 12:00 PM CDT   (Arrive by 11:45 AM)   Return Visit with MARLON Chiu   [facility] ([facility])    [address]  [address]  [place] [facility] [address]   [phone]            Sep 21, 2018 12:30 PM CDT   MA SCREENING BILATERAL W/ THAIS with [id number]   [facility] ([facility])    [address]  [place] [address]   [phone]           Three-dimensional (3D) mammograms are available at [place] locations in [facility], [place], [place], [facility], [place], [place], and [place]. [facility] locations include [facility] in [place]. Benefits of 3D mammograms include: - Improved rate of cancer detection - Decreases your chance of having to go back for more tests, which means fewer: - \"False-positive\" results (This means that there is an abnormal area but it isn't cancer.) - Invasive testing procedures, such as a biopsy or surgery - Can provide clearer images of the breast if you have dense breast tissue. 3D mammography is an optional exam that anyone can have with a 2D mammogram. It doesn't replace or take the place of a 2D mammogram. 2D mammograms remain an effective screening test for all women.  Not all insurance companies cover the cost of a 3D mammogram. Check with your insurance.            Oct 22, 2018 10:00 AM CDT   RETURN GENERAL with Mary Jo Massey MD   [facility] ([facility])    [address]  [address]  [facility] [address] [phone]              Who to contact     Please call your clinic at " [phone] to:    Ask questions about your health    Make or cancel appointments    Discuss your medicines    Learn about your test results    Speak to your doctor            Additional Information About Your Visit        [employer] Information     [employer] gives you secure access to your electronic health record. If you see a primary care provider, you can also send messages to your care team and make appointments. If you have questions, please call your primary care clinic.  If you do not have a primary care provider, please call [phone] and they will assist you.      [employer] is an electronic gateway that provides easy, online access to your medical records. With [employer], you can request a clinic appointment, read your test results, renew a prescription or communicate with your care team.     To access your existing account, please contact your [facility] or call [phone] for assistance.        Care EveryWhere ID     This is your Care EveryWhere ID. This could be used by other organizations to access your [facility] medical records  [id number]        Your Vitals Were     Last Period                   01/01/2009            Blood Pressure from Last 3 Encounters:   08/03/18 112/72   07/29/18 132/84   07/13/18 104/66    Weight from Last 3 Encounters:   08/03/18 91.2 kg (201 lb)   07/29/18 90.7 kg (200 lb)   07/13/18 90.5 kg (199 lb 9.6 oz)              Today, you had the following     No orders found for display         Today's Medication Changes          These changes are accurate as of 8/6/18 12:14 PM.  If you have any questions, ask your nurse or doctor.               These medicines have changed or have updated prescriptions.        Dose/Directions    loratadine 10 MG tablet   Commonly known as:  CLARITIN   This may have changed:    - when to take this  - reasons to take this   Used for:  Pain in joint, multiple sites        Dose:  10 mg   Take 1 tablet (10 mg) by mouth daily    Quantity:  30 tablet   Refills:  1                Primary Care Provider Office Phone # Fax #    Morelia Ayon, [phone] [address] [facility] [address]        Equal Access to Services     RONEY ARANDA AH: Hadii serena ku hadtereo Soomaali, waaxda luqadaha, qaybta kaalmada adeegyada, sujatha marlenyin hayaan justenwood bragg alpesh friedman. So [facility] [phone].    ATENCIÓN: Si habla español, tiene a martínez disposición servicios gratuitos de asistencia lingüística. Llame al [phone].    We comply with applicable federal civil rights laws and [place] laws. We do not discriminate on the basis of race, color, national origin, age, disability, sex, sexual orientation, or gender identity.            Thank you!     Thank you for choosing [facility]  for your care. Our goal is always to provide you with excellent care. Hearing back from our patients is one way we can continue to improve our services. Please take a few minutes to complete the written survey that you may receive in the mail after your visit with us. Thank you!             Your Updated Medication List - Protect others around you: Learn how to safely use, store and throw away your medicines at www.disposemymeds.org.          This list is accurate as of 8/6/18 12:14 PM.  Always use your most recent med list.                   Brand Name Dispense Instructions for use Diagnosis    * atorvastatin 80 MG tablet    LIPITOR    90 tablet    TAKE 1 TABLET(80 MG) BY MOUTH DAILY    Hyperlipidemia LDL goal <100       * atorvastatin 80 MG tablet    LIPITOR    90 tablet    TAKE 1 TABLET(80 MG) BY MOUTH DAILY    Hyperlipidemia LDL goal <100       blood glucose lancets standard    no brand specified    100 each    Use to test blood sugar 2 times daily or as directed.    Type 2 diabetes mellitus without complication, without long-term current use of insulin (H)       blood glucose monitoring meter device kit    no brand specified    1 kit    Use to test blood  sugar 2 times daily or as directed.    Type 2 diabetes mellitus without complication, without long-term current use of insulin (H)       blood glucose monitoring test strip    no brand specified    100 strip    Use to test blood sugars 2 times daily or as directed    Type 2 diabetes mellitus without complication, without long-term current use of insulin (H)       CALCIUM-MAGNESIUM-VITAMIN D PO      Take 2 tablets by mouth daily        cephALEXin 500 MG capsule    KEFLEX    21 capsule    Take 1 capsule (500 mg) by mouth 3 times daily for 7 days    Urethritis       EPINEPHrine 0.3 MG/0.3ML injection 2-pack    EPIPEN/ADRENACLICK/or ANY BX GENERIC EQUIV    0.3 mL    Inject 0.3 mLs (0.3 mg) into the muscle once as needed for anaphylaxis    Bee allergy status       escitalopram 20 MG tablet    LEXAPRO    30 tablet    Take 1 tablet (20 mg) by mouth daily    Major depressive disorder, recurrent episode, mild (H)       EYE DROPS OP      OTC saline eye drops        fluticasone 50 MCG/ACT spray    FLONASE    48 mL    SHAKE WELL AND USE 2 SPRAYS IN EACH NOSTRIL DAILY    Chronic maxillary sinusitis       lisinopril 10 MG tablet    PRINIVIL/ZESTRIL    90 tablet    Take 1 tablet (10 mg) by mouth daily    Essential hypertension       loratadine 10 MG tablet    CLARITIN    30 tablet    Take 1 tablet (10 mg) by mouth daily    Pain in joint, multiple sites       LORazepam 1 MG tablet    ATIVAN    30 tablet    Take 1 tablet (1 mg) by mouth every 6 hours as needed (nausea/vomiting, anxiety or sleep)    Ovarian cancer, left (H), Encounter for long-term (current) use of medications       MAGNESIA PO      Take 500 mg by mouth        metFORMIN 500 MG 24 hr tablet    GLUCOPHAGE-XR    180 tablet    TAKE 2 TABLETS BY MOUTH ONCE DAILY WITH EVENING MEAL    Type 2 diabetes mellitus without complication, without long-term current use of insulin (H)       MULTIVITAMIN ADULT PO      Take 1 tablet by mouth daily        * ofloxacin 0.3 % ophthalmic  "solution    OCUFLOX    1 Bottle    1 drop 4x daily in the surgical eye for 1 week after surgery, then stop    Nuclear sclerotic cataract, left       * ofloxacin 0.3 % ophthalmic solution    OCUFLOX    1 Bottle    1 drop 4x daily in the surgical eye for 1 week after surgery, then stop    Nuclear sclerotic cataract, right       order for DME     1 Units    Equipment being ordered: blood pressure monitor    Essential hypertension       * order for DME     1 each    Compression stockings 20-30 mm Hg. To be wear when directed by Hematology team and while awake for the first two years post clot.    Long-term (current) use of anticoagulants, Acute deep vein thrombosis (DVT) of left lower extremity, unspecified vein (H)       * order for DME     1 Bottle    Plain packing strip 1/4\"    Abscess of leg       * order for DME     1 Box    Sterile zoey tipped applicators    Abscess of leg       * order for DME     3 Bottle    USE 1/4 INCH  WIDTH PLAIN NU GAUZE PACKING TO DO SELF WOUND CARE OF LEFT LOWER LEG ONCE DAILY. DIMENSIONS OF WOUND PRESENTLY ARE 2 X 2 INCH AND APPROXIMATELY 1/2 INCH DEEP.  USES 6 INCHES OF PACKING CURRENTLY FOR DRESSING CHANGE.  FAX TO [employer] [phone]    Wound of left leg       * order for DME     30 pad    STERILE ADHESIVE PAD BANDAGE AT LEAST 4X4 INCH IN SIZE NEEDED FOR DAILY WOUND CARE TO LEFT LOWER LEG. FAX TO [facility] [phone]    Leg wound, left       * order for DME     1 Units    Home blood pressure monitor    Type 2 diabetes mellitus without complication, without long-term current use of insulin (H)       * prednisoLONE acetate 1 % ophthalmic susp    PRED FORTE    1 Bottle    1 drop in surgical eye as directed, 4x daily after surgery for 1 week, 3x daily for 1 week, 2x daily for 1 week, daily for 1 week, then stop    Nuclear sclerotic cataract, left       * prednisoLONE acetate 1 % ophthalmic susp    PRED FORTE    1 Bottle    1 drop in surgical eye as directed, 4x daily " after surgery for 1 week, 3x daily for 1 week, 2x daily for 1 week, daily for 1 week, then stop    Nuclear sclerotic cataract, right       PRO-BIOTIC BLEND PO      Take 1 capsule by mouth daily Enzymatic Therapy-probiotic pearls        rivaroxaban ANTICOAGULANT 20 MG Tabs tablet    XARELTO    30 tablet    Take 1 tablet (20 mg) by mouth daily (with dinner)    Long-term (current) use of anticoagulants, Acute deep vein thrombosis (DVT) of left lower extremity, unspecified vein (H)       traZODone 50 MG tablet    DESYREL    180 tablet    TAKE 1 TO 2 TABLETS(50  MG) BY MOUTH EVERY NIGHT AS NEEDED FOR SLEEP    Insomnia, unspecified type       VITAMIN B-COMPLEX PO           vitamin D 1000 units capsule      Take 2,000 Units by mouth daily        * Notice:  This list has 12 medication(s) that are the same as other medications prescribed for you. Read the directions carefully, and ask your doctor or other care provider to review them with you.

## 2018-08-09 ENCOUNTER — HOSPITAL ENCOUNTER (OUTPATIENT)
Dept: PHYSICAL THERAPY | Facility: CLINIC | Age: 63
Setting detail: THERAPIES SERIES
End: 2018-08-09
Attending: NURSE PRACTITIONER
Payer: COMMERCIAL

## 2018-08-09 PROCEDURE — 40000360 ZZHC STATISTIC PT CANCER REHAB VISIT: Performed by: PHYSICAL THERAPIST

## 2018-08-09 PROCEDURE — 97110 THERAPEUTIC EXERCISES: CPT | Mod: GP | Performed by: PHYSICAL THERAPIST

## 2018-08-10 ENCOUNTER — HOSPITAL ENCOUNTER (OUTPATIENT)
Dept: OCCUPATIONAL THERAPY | Facility: CLINIC | Age: 63
Setting detail: THERAPIES SERIES
End: 2018-08-10
Attending: NURSE PRACTITIONER
Payer: COMMERCIAL

## 2018-08-10 PROCEDURE — 97535 SELF CARE MNGMENT TRAINING: CPT | Mod: GO | Performed by: OCCUPATIONAL THERAPIST

## 2018-08-10 PROCEDURE — 40000361 ZZHC STATISTIC OT CANCER REHAB VISIT: Performed by: OCCUPATIONAL THERAPIST

## 2018-08-13 ENCOUNTER — MYC MEDICAL ADVICE (OUTPATIENT)
Dept: FAMILY MEDICINE | Facility: CLINIC | Age: 63
End: 2018-08-13

## 2018-08-14 DIAGNOSIS — Z85.43 PERSONAL HISTORY OF OVARIAN CANCER: Primary | ICD-10-CM

## 2018-08-15 ENCOUNTER — HOSPITAL ENCOUNTER (OUTPATIENT)
Dept: PHYSICAL THERAPY | Facility: CLINIC | Age: 63
Setting detail: THERAPIES SERIES
End: 2018-08-15
Attending: NURSE PRACTITIONER
Payer: COMMERCIAL

## 2018-08-15 ENCOUNTER — HOSPITAL ENCOUNTER (OUTPATIENT)
Dept: OCCUPATIONAL THERAPY | Facility: CLINIC | Age: 63
Setting detail: THERAPIES SERIES
End: 2018-08-15
Attending: NURSE PRACTITIONER
Payer: COMMERCIAL

## 2018-08-15 PROCEDURE — 40000360 ZZHC STATISTIC PT CANCER REHAB VISIT: Performed by: PHYSICAL THERAPIST

## 2018-08-15 PROCEDURE — 40000361 ZZHC STATISTIC OT CANCER REHAB VISIT: Performed by: OCCUPATIONAL THERAPIST

## 2018-08-15 PROCEDURE — 97535 SELF CARE MNGMENT TRAINING: CPT | Mod: GO | Performed by: OCCUPATIONAL THERAPIST

## 2018-08-15 PROCEDURE — 97110 THERAPEUTIC EXERCISES: CPT | Mod: GP | Performed by: PHYSICAL THERAPIST

## 2018-08-16 DIAGNOSIS — N39.0 URINARY TRACT INFECTION WITH HEMATURIA, SITE UNSPECIFIED: ICD-10-CM

## 2018-08-16 DIAGNOSIS — R31.9 URINARY TRACT INFECTION WITH HEMATURIA, SITE UNSPECIFIED: ICD-10-CM

## 2018-08-16 LAB
ALBUMIN UR-MCNC: NEGATIVE MG/DL
APPEARANCE UR: CLEAR
BACTERIA #/AREA URNS HPF: ABNORMAL /HPF
BILIRUB UR QL STRIP: NEGATIVE
COLOR UR AUTO: YELLOW
GLUCOSE UR STRIP-MCNC: NEGATIVE MG/DL
HGB UR QL STRIP: ABNORMAL
KETONES UR STRIP-MCNC: NEGATIVE MG/DL
LEUKOCYTE ESTERASE UR QL STRIP: NEGATIVE
NITRATE UR QL: NEGATIVE
NON-SQ EPI CELLS #/AREA URNS LPF: ABNORMAL /LPF
PH UR STRIP: 6 PH (ref 5–7)
RBC #/AREA URNS AUTO: ABNORMAL /HPF
SOURCE: ABNORMAL
SP GR UR STRIP: 1.02 (ref 1–1.03)
UROBILINOGEN UR STRIP-ACNC: 0.2 EU/DL (ref 0.2–1)
WBC #/AREA URNS AUTO: ABNORMAL /HPF

## 2018-08-16 PROCEDURE — 81001 URINALYSIS AUTO W/SCOPE: CPT | Performed by: FAMILY MEDICINE

## 2018-08-20 NOTE — ADDENDUM NOTE
Encounter addended by: Leena Albright, PT on: 8/20/2018 12:33 PM<BR>     Actions taken: Episode edited

## 2018-08-22 ENCOUNTER — HOSPITAL ENCOUNTER (OUTPATIENT)
Dept: OCCUPATIONAL THERAPY | Facility: CLINIC | Age: 63
Setting detail: THERAPIES SERIES
End: 2018-08-22
Attending: NURSE PRACTITIONER
Payer: COMMERCIAL

## 2018-08-22 ENCOUNTER — HOSPITAL ENCOUNTER (OUTPATIENT)
Dept: PHYSICAL THERAPY | Facility: CLINIC | Age: 63
Setting detail: THERAPIES SERIES
End: 2018-08-22
Attending: NURSE PRACTITIONER
Payer: COMMERCIAL

## 2018-08-22 PROCEDURE — 97535 SELF CARE MNGMENT TRAINING: CPT | Mod: GO | Performed by: OCCUPATIONAL THERAPIST

## 2018-08-22 PROCEDURE — 40000360 ZZHC STATISTIC PT CANCER REHAB VISIT: Performed by: PHYSICAL THERAPIST

## 2018-08-22 PROCEDURE — 40000361 ZZHC STATISTIC OT CANCER REHAB VISIT: Performed by: OCCUPATIONAL THERAPIST

## 2018-08-22 PROCEDURE — 97112 NEUROMUSCULAR REEDUCATION: CPT | Mod: GP | Performed by: PHYSICAL THERAPIST

## 2018-08-22 PROCEDURE — 97110 THERAPEUTIC EXERCISES: CPT | Mod: GP | Performed by: PHYSICAL THERAPIST

## 2018-08-22 NOTE — DISCHARGE INSTRUCTIONS
8/22/18   - Ideal bedtime: midnight - 8am   - Slowly bring your bedtime earlier by 15 minute increments: Begin with a goal of 11:30 - 11:45pm bedtime.   - Try 2, 5 minute walks - parking further away, taking the stairs

## 2018-08-23 DIAGNOSIS — Z08 ENCOUNTER FOR FOLLOW-UP SURVEILLANCE OF OVARIAN CANCER: ICD-10-CM

## 2018-08-23 DIAGNOSIS — Z85.43 ENCOUNTER FOR FOLLOW-UP SURVEILLANCE OF OVARIAN CANCER: ICD-10-CM

## 2018-08-23 DIAGNOSIS — Z79.01 LONG-TERM (CURRENT) USE OF ANTICOAGULANTS: ICD-10-CM

## 2018-08-23 DIAGNOSIS — I82.402 ACUTE DEEP VEIN THROMBOSIS (DVT) OF LEFT LOWER EXTREMITY, UNSPECIFIED VEIN (H): ICD-10-CM

## 2018-08-23 LAB — CANCER AG125 SERPL-ACNC: 11 U/ML (ref 0–30)

## 2018-08-23 PROCEDURE — 86304 IMMUNOASSAY TUMOR CA 125: CPT | Performed by: NURSE PRACTITIONER

## 2018-08-23 PROCEDURE — 25000128 H RX IP 250 OP 636: Performed by: NURSE PRACTITIONER

## 2018-08-23 RX ORDER — HEPARIN SODIUM (PORCINE) LOCK FLUSH IV SOLN 100 UNIT/ML 100 UNIT/ML
5 SOLUTION INTRAVENOUS ONCE
Status: COMPLETED | OUTPATIENT
Start: 2018-08-23 | End: 2018-08-23

## 2018-08-23 RX ADMIN — SODIUM CHLORIDE, PRESERVATIVE FREE 5 ML: 5 INJECTION INTRAVENOUS at 12:36

## 2018-08-23 NOTE — NURSING NOTE
"Chief Complaint   Patient presents with     Port Draw     port accessed and labs drawn by rn.       Port accessed with 20g 3/4\" gripper needle and labs drawn by RN.  Port flushed with saline and heparin.  Port de-accessed.    Vielka Sidhu RN    "

## 2018-08-24 ENCOUNTER — ONCOLOGY VISIT (OUTPATIENT)
Dept: ONCOLOGY | Facility: CLINIC | Age: 63
End: 2018-08-24
Attending: NURSE PRACTITIONER
Payer: COMMERCIAL

## 2018-08-24 VITALS
OXYGEN SATURATION: 95 % | HEART RATE: 87 BPM | BODY MASS INDEX: 36.5 KG/M2 | WEIGHT: 206 LBS | HEIGHT: 63 IN | SYSTOLIC BLOOD PRESSURE: 100 MMHG | RESPIRATION RATE: 18 BRPM | DIASTOLIC BLOOD PRESSURE: 65 MMHG | TEMPERATURE: 98.2 F

## 2018-08-24 DIAGNOSIS — Z08 ENCOUNTER FOR FOLLOW-UP SURVEILLANCE OF OVARIAN CANCER: Primary | ICD-10-CM

## 2018-08-24 DIAGNOSIS — Z85.43 ENCOUNTER FOR FOLLOW-UP SURVEILLANCE OF OVARIAN CANCER: Primary | ICD-10-CM

## 2018-08-24 PROCEDURE — G0463 HOSPITAL OUTPT CLINIC VISIT: HCPCS | Mod: ZF

## 2018-08-24 PROCEDURE — 99213 OFFICE O/P EST LOW 20 MIN: CPT | Mod: ZP | Performed by: NURSE PRACTITIONER

## 2018-08-24 ASSESSMENT — PAIN SCALES - GENERAL: PAINLEVEL: NO PAIN (0)

## 2018-08-24 NOTE — PROGRESS NOTES
Gynecologic Oncology Follow-Up Visit    RE: Hafsa Corona  MRN: [id number]  : 1955  Date of Visit: 2018    CC: Hafsa Corona  is a 63 year old female with a history of stage IC2 clear cell ovarian carcinoma. She completed treatment with surgery and three cycles of chemotherapy on 10/17/17. She presents today for a three month surveillance visit.    HPI: Hafsa comes to the clinic feeling well from a gynecologic perspective. Reports occasional UTIs post-intercourse- has been voiding post-coitus. Having a recheck of hematuria with her PCP. Denies sexual concerns, does not use lubricant. Fatigue has improved with cancer rehab and she is working on increase her hours at work. She is aware that she has gained 12lb in the past three months- attributes this to her love of sweets. Has done Weight Watchers in the past and is considering restarting this. Doing exercises from cancer rehab. UTD on seeing her PCP, colonoscopy, and breast screening. She sees an ophthalmologist and recently had cataract surgery. Having her port flushed every six weeks. Had chest CT at [facility] 2018 which demonstrated stability of pulmonary nodules. Denies unintended weight loss, fatigue, weakness, changes in vision or hearing, shortness of breath, cough, chest pain, abdominal pain, dyspepsia, nausea, vomiting, constipation, bloating, dysuria, urinary frequency or urgency, pelvic pain, lower back pain, vaginal bleeding, vaginal discharge, or numbness/tingling. 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       Oncology History:  The patient has had a recent episode of lower abdominal pain in early July.  She was subsequently sent to the emergency room and was found to have a large 9 cm complex ovarian mass. She was admitted to the hospital for pain control.  7/3/17:  1187  17: Exploratory laparotomy, total abdominal hysterectomy, left salpingo-oophorectomy, cancer staging including infracolic  omentectomy, bilateral pelvic & para-aortic lymphadenectomy, peritoneal biopsies, evacuation of pelvic fluid by Dr. Cole.    FINAL DIAGNOSIS:   A. LEFT OVARY AND FALLOPIAN TUBE, LEFT SALPINGO-OOPHORECTOMY:   - Ovarian clear cell carcinoma   - Background of endometriosis   - Fallopian tube with no significant histologic abnormality.   B. UTERUS, HYSTERECTOMY:   -  Inactive endometrium   -  Myometrium with adenomyosis and leiomyoma.   -  Cervix with squamous metaplasia.   C. PERITONEUM, RIGHT PELVIS, BIOPSY:   - Fibroadipose tissue, negative for malignancy.   D. LYMPH NODES, RIGHT PELVIC, DISSECTION:   - Thirteen benign lymph nodes (0/13).   E. LYMPH NODES, LEFT PELVIC, DISSECTION:   - Seven benign lymph nodes (0/7).   F. PERITONEUM, LEFT PELVIS, BIOPSY:   - Fibroadipose tissue, negative for malignancy.   G. PERITONEUM, BLADDER, BIOPSY:   - Fibroadipose tissue  with acute and chronic inflammation, negative for malignancy.   H. PERITONEUM, POSTERIOR CUL-DE-SAC, BIOPSY:   - Fibroadipose tissue, negative for malignancy.   I. PERITONEUM, LEFT PARACOLIC GUTTER, BIOPSY:   - Fibroadipose tissue, negative for malignancy.   J. LYMPH NODES, LEFT PARA-AORTIC, DISSECTION:   - Five benign lymph nodes (0/5).   K. LYMPH NODES, RIGHT PARA-AORTIC, DISSECTION:   - Two benign lymph nodes (0/2).   L. PERITONEUM, RIGHT PARACOLIC GUTTER, BIOPSY:   - Fibroadipose tissue, negative for malignancy.   M. OMENTUM, OMENTECTOMY:   - Adipose tissue with reactive changes, negative for malignancy.   COMMENT:   The final diagnosis confirms the interpretation provided intraoperatively.   Report Name: Ovary or Fallopian Tube   Status: Submitted   Part(s) Involved:   A: Ovary and fallopian tube, left   Synoptic Report:   CLINICAL   Clinical History:   - Pelvic mass   SPECIMEN   Procedure:   - Left salpingo-oophorectomy   - Hysterectomy   - Omentectomy   - Peritoneal  biopsies   Lymph Node Sampling:   - Performed   Location:   - Pelvic lymph nodes   -  Para-aortic lymph nodes   Specimen Integrity:   - Left ovary   Specimen Integrity of Left Ovary:   - Capsule intact   TUMOR   Primary Tumor Site(s):   - Left ovary   Left Ovary   Tumor Size of Left Ovary: 19 cm   Histologic Type:   - Clear cell carcinoma   Tumor Extent   Ovarian Surface Involvement:   - Absent   Specimen(s)   Extent of Left Ovary:   - Involved   Extent of Left Fallopian Tube:   - Not involved   Extent of Omentum:   - Not involved   Extent of Uterus:   - Not involved   Extent of Peritoneum:   - Not involved   Peritoneal Ascitic Fluid:   - Not performed / unknown   Pleural Fluid:   - Not performed / unknown   LYMPH NODES     Number of Pelvic Lymph Nodes Examined: 20     Number of Pelvic Lymph Nodes Involved: None identified     Number of Para-aortic Lymph Nodes Examined: 7     Number of Para-Aortic Lymph Nodes Involved: None identified   STAGE (PTNM, AJCC 7TH ED.)   Primary Tumor (pT):   - Ovary   Ovarian Primary Tumor (pT):   - pT1a: Tumor limited to 1 ovary; capsule intact, no tumor on ovarian surface. No malignant cells in ascites or peritoneal washings#   Regional Lymph Nodes (pN):   - pN0: No regional lymph node metastasis       07/31/17: Dr Shaffer follow up: Discussed with the patient that given the high risk histology, as well as ruptured mass before surgery, she would be qualified at higher risk of recurrence despite early stage ovarian cancer.  Recommended adjuvant chemotherapy. Plan for carboplatin of AUC of 6 and paclitaxel of 175, m2 for 3 cycles. Referral for genetic counselor and will see her back after those 3 cycles  08/03/17: Call from patient stating she is going for a second opinion and would like chemotherapy rescheduled to week of 8/14/17.      08/16/17: Cycle #1 Carbo/Taxol.  73. Significant paclitaxel reaction.  08/30/17: C1 carboplatin/inpatient paclitaxel desensitization.   09/25/17: C2 carboplatin/paclitaxel- inpatient paclitaxel desensitization.   15.  10/16/17: C3 carboplatin/paclitaxel- switched to docetaxel given her neuropathy.  10.  11/14/17:  8. CT CAP:  IMPRESSION:   1. Post surgical changes of hysterectomy and bilateral  salpingo-oophorectomy. Nodular soft tissue density in the posterior  cul-de-sac along with ill-defined nodular thickening in the left  pelvis, suspicious for residual disease or metastatic deposits.   2. There is a 3 cm mass in the superior pole of the right kidney,  possibly extending into the renal hilum. Represents RCC until proven  otherwise. Consider renal mass protocol CT to assess renal vein  involvement.  3. Stable sub-4 mm pulmonary nodules, continued attention on  follow-up.    11/16/17: CT renal mass protocol  IMPRESSION:  1. Arterially enhancing mass measuring 3.6 x 2.1 x 2.2 cm mass arising  from superior posterior of the right kidney, this is renal cell  carcinoma until proven otherwise.  2. Stable fat-containing umbilical hernia.    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 Margins negative. Three month surveillance plan with [facility].     2/26/18:  14    5/22/18:  11     8/23/18:  11    Past Medical History:   Diagnosis Date     Anxiety state, unspecified     1967-1492     Arthritis 2014    vague, gradual, not treated really, knees feel it     Attention deficit disorder without mention of hyperactivity      Cancer (H) 7/2017 and 1/2018    ovarian and kidney cancers, both treated well     Chronic rhinitis      Depressive disorder lifetime    plus Anxiety plus ADD. Escitalipram, therapy, ADD meds maybe     Depressive disorder, not elsewhere classified      Heart disease 1999    tachycardia and high cholesterol, managed     History of blood transfusion 7/2017    while in hospital for ovarian cancer     Hypertension 1999    tho BP was too LOW last week. past 12 years Generally OK     Panic disorder without agoraphobia      Pure hypercholesterolemia     Lipitor      Tachycardia      Tachycardia, unspecified     9/1999-2/2004     Type II or unspecified type diabetes mellitus without mention of complication, not stated as uncontrolled     diagnosed 2004       Past Surgical History:   Procedure Laterality Date     AS REMV KIDNEY,RADICAL Right      BIOPSY  7/2017 and 2/2018    ovarian and kidney cancer biopsies. also 1987 breast benign     BREAST SURGERY  1987    date unsure. benign breast cyst removed     CATARACT IOL, RT/LT Left 05/18/2018     CATARACT IOL, RT/LT Right 07/1318     COLONOSCOPY  2008 and 2013    polyps removed. Next due fall 2018     HYSTERECTOMY TOTAL ABDOMINAL, BILATERAL SALPINGO-OOPHORECTOMY, NODE DISSECTION, COMBINED Left 7/7/2017    Procedure: COMBINED HYSTERECTOMY TOTAL ABDOMINAL, SALPINGO-OOPHORECTOMY, NODE DISSECTION;  Exploratory Laparotomy, Left Salpingo-Oophorectomy, Cancer Staging, Total Hysterectomy, Omentectomy, Evacuation of abdominal fluid, Lymph Node Dissection  Anesthesia Block ;  Surgeon: Serena Cole MD;  Location: [facility] OR     HYSTERECTOMY, PAP NO LONGER INDICATED  07/07/2017    Laparotomy; for ovarian cancer staging     HYSTERECTOMY, PAP NO LONGER INDICATED       INSERT PORT VASCULAR ACCESS Right 8/21/2017    Procedure: INSERT PORT VASCULAR ACCESS;  Single Lumen Chest Power Port;  Surgeon: Stephen Mike PA-C;  Location: [facility] OR     LYMPHADENECTOMY RETROPERITONEAL Bilateral 07/07/2017    Laparotomy; pelvics & para-aortics; for ovarian cancer staging     OMENTECTOMY  07/07/2017    Laparotomy; for ovarian cancer staging     ORTHOPEDIC SURGERY  1/2002    broken left jaw due to fall, 4 fractures, titanium plates     PHACOEMULSIFICATION CLEAR CORNEA WITH STANDARD INTRAOCULAR LENS IMPLANT Right 7/13/2018    Procedure: PHACOEMULSIFICATION CLEAR CORNEA WITH STANDARD INTRAOCULAR LENS IMPLANT;  RIght Eye Phacoemulsification Clear Cornea with Standard Intraocular Lens Placement ;  Surgeon: Mary Jo Massey MD;  Location: [facility] OR      PHACOEMULSIFICATION CLEAR CORNEA WITH TORIC INTRAOCULAR LENS IMPLANT Left 2018    Procedure: PHACOEMULSIFICATION CLEAR CORNEA WITH TORIC INTRAOCULAR LENS IMPLANT;  Left Eye Phacoemulsification with Toric Lens;  Surgeon: Mary Jo Massey MD;  Location: [facility] OR     SALPINGO OOPHORECTOMY,R/L/KAYY Right     Salpingo Oophorectomy, RT     SALPINGO OOPHORECTOMY,R/L/KAYY Left 2017    Laparotomy; for ovarian cancer staging     SURGICAL HISTORY OF -       facial surgery d/t fall in 2002     SURGICAL HISTORY OF -        removal of breast cyst     SURGICAL HISTORY OF -       endometrial ablation       Social History     Social History     Marital status: Single     Spouse name: N/A     Number of children: N/A     Years of education: N/A     Occupational History      Self Employed.     Social History Main Topics     Smoking status: Never Smoker     Smokeless tobacco: Never Used     Alcohol use Yes      Comment: Very infrequent, a bit of wine or beer 2x per month maybe     Drug use: Yes     Special: Marijuana      Comment: infrequent, for celebrations only, maybe 8x per year     Sexual activity: Yes     Partners: Male     Birth control/ protection: None      Comment: long-time      Other Topics Concern     Parent/Sibling W/ Cabg, Mi Or Angioplasty Before 65f 55m? No     Social History Narrative       Family History   Problem Relation Age of Onset     C.A.D. Father      Diabetes Father      Later in his life, in his 70s     Hypertension Father      Cerebrovascular Disease Father      1984 or      Psychotic Disorder Father      Pancreatic Cancer Father      Coronary Artery Disease Father      diagnosed in his late 50s (age)     Hyperlipidemia Father      treated w statins     Other Cancer Father      pancreatic, ,  of this     Depression Father      Mental Illness Father      likely PTSD and depression     Obesity Father      C.A.D. Mother      Hypertension Mother      Breast Cancer  Mother      2 times,  and ?     Psychotic Disorder Mother      Colon Cancer Mother      ?     Cancer Mother      Bone cancer     Coronary Artery Disease Mother      diagnosed in her late 70s (age)     Hyperlipidemia Mother      treated w. statins     Other Cancer Mother      bone/marrow, ,  of this     Depression Mother      Anxiety Disorder Mother      Mental Illness Mother      various undiagnosed types, but THERE     Obesity Mother      Prostate Problems Brother      cleared      Hypertension Brother      Hyperlipidemia Brother      unsure if treated w statins     Depression Brother      Anxiety Disorder Brother      Mental Illness Brother      undiagnosed but THERE     Obesity Brother      Psychotic Disorder Sister      x2     Neurologic Disorder Sister      Hypertension Sister      Hyperlipidemia Sister      treated w statins     Depression Sister      Anxiety Disorder Sister      Obesity Sister      Psychotic Disorder Brother      x2     Depression Brother      major depressive disorder and OCD     Anxiety Disorder Brother      Mental Illness Brother      not sure of diagnoses, treated     Hypertension Brother      Hyperlipidemia Brother      Psychotic Disorder Maternal Grandmother      ?     Coronary Artery Disease Maternal Grandmother       of heart attack age 84?     Depression Maternal Grandmother      Psychotic Disorder Maternal Grandfather      ?     Psychotic Disorder Paternal Grandmother      Schizophernia     Coronary Artery Disease Paternal Grandmother       of heart attack, age 85?     Depression Paternal Grandmother      severe, but recovered     Mental Illness Paternal Grandmother      possible bipolar or other     Psychotic Disorder Paternal Grandfather      ?     Respiratory Paternal Grandfather       of emphysema and smoking     Psychotic Disorder Sister      Other - See Comments Sister      small kidney stone      Hypertension Sister      Hyperlipidemia Sister       treated w statins     Depression Sister      Anxiety Disorder Sister      Cancer - colorectal No family hx of      Glaucoma No family hx of      Macular Degeneration No family hx of        Current Outpatient Prescriptions   Medication     atorvastatin (LIPITOR) 80 MG tablet     atorvastatin (LIPITOR) 80 MG tablet     B Complex Vitamins (VITAMIN B-COMPLEX PO)     blood glucose (NO BRAND SPECIFIED) lancets standard     blood glucose monitoring (NO BRAND SPECIFIED) meter device kit     blood glucose monitoring (NO BRAND SPECIFIED) test strip     CALCIUM-MAGNESIUM-VITAMIN D PO     Cholecalciferol (VITAMIN D) 1000 UNIT capsule     EPINEPHrine (EPIPEN/ADRENACLICK/OR ANY BX GENERIC EQUIV) 0.3 MG/0.3ML injection 2-pack     escitalopram (LEXAPRO) 20 MG tablet     fluticasone (FLONASE) 50 MCG/ACT spray     lisinopril (PRINIVIL/ZESTRIL) 10 MG tablet     loratadine (CLARITIN) 10 MG tablet     LORazepam (ATIVAN) 1 MG tablet     Magnesium Hydroxide (MAGNESIA PO)     metFORMIN (GLUCOPHAGE-XR) 500 MG 24 hr tablet     Multiple Vitamins-Minerals (MULTIVITAMIN ADULT PO)     ofloxacin (OCUFLOX) 0.3 % ophthalmic solution     ofloxacin (OCUFLOX) 0.3 % ophthalmic solution     order for DME     order for DME     order for DME     order for DME     order for DME     order for DME     order for DME     prednisoLONE acetate (PRED FORTE) 1 % ophthalmic susp     prednisoLONE acetate (PRED FORTE) 1 % ophthalmic susp     Probiotic Product (PRO-BIOTIC BLEND PO)     rivaroxaban ANTICOAGULANT (XARELTO) 20 MG TABS tablet     Tetrahydrozoline HCl (EYE DROPS OP)     traZODone (DESYREL) 50 MG tablet     No current facility-administered medications for this visit.           Allergies   Allergen Reactions     Paclitaxel Anaphylaxis and Difficulty breathing     Wasps [Hornets] Anaphylaxis     Yellow Hornet Venom [Hornet Venom] Anaphylaxis and Swelling     Yellow Jacket Venom [Venomil Honey Bee] Anaphylaxis and Swelling     No Clinical Screening - See  "Comments      Taxol      Sulfa Drugs Hives       Review of Systems  General: Denies fatigue, changes in weight, weakness, appetite changes, night sweats, hot flashes, fever, chills, or difficulty sleeping  HEENT: Denies headaches, hair loss, spots or floaters, diplopia, masses, head injury, tinnitus, hearing loss, epistaxis, congestion, problems with teeth or gums, dysphonia, or dysphagia  Pulmonary: Denies cough, sputum, hemoptysis, shortness of breath, dyspnea on exertion, wheezing, or allergies  Cardiovascular: Denies chest pain, fainting, palpitations, murmurs, activity intolerance, edema, or high blood pressure  Gastrointestinal: Denies nausea, vomiting, constipation, diarrhea, abdominal pain, bloating, heartburn, melena, hematochezia, or jaundice  Genitourinary: + UTIs, hematuria. Denies dysuria, urinary urgency or frequency, cloudy or malodorous urine, incontinence, flank pain, pelvic pain, vaginal bleeding, vaginal discharge, or vaginal dryness  Sexual Function: Denies pain with intercourse, changes in libido, arousal difficulty, or changes in orgasm  Integumentary: Denies rashes, sores, changing moles, or scarring  Hematologic: Denies swollen lymph nodes, masses, easy bruising, or easy bleeding  Musculoskeletal: + arthralgias. Denies falls, back pain, myalgias, stiffness, muscle weakness or muscle cramps  Neurologic: Denies changes in memory, difficulty with walking, seizures, numbness and tingling, dizziness, or tremors  Psychiatric: Denies anxiety, depression, suicidal thoughts, or difficulty concentrating  Endocrine: Denies polydipsia, polyuria, temperature intolerance, or history of thyroid disease      OBJECTIVE:    Physical Exam:    /65 (BP Location: Right arm, Patient Position: Sitting, Cuff Size: Adult Regular)  Pulse 87  Temp 98.2  F (36.8  C) (Oral)  Resp 18  Ht 1.6 m (5' 2.99\")  Wt 93.4 kg (206 lb)  LMP 01/01/2009  SpO2 95%  BMI 36.5 kg/m2    CONSTITUTIONAL: Alert non-toxic " appearing female in no acute distress  HEAD: Normocephalic, atraumatic  EYES: PERRLA; no scleral icterus  ENT: Oropharynx pink without lesions  NECK: Neck supple without palpable lymphadenopathy  RESPIRATORY: Lungs clear to auscultation, no increased work of breathing noted  CV: Regular rate and rhythm, S1S2, no clicks, murmurs, rubs, or gallops; bilateral lower extremities with trace edema, dorsalis pedis pulses 2+ bilaterally  GASTROINTESTINAL: Normoactive bowel sounds x4 quadrants, abdomen soft, non-distended, and non-tender to palpation without masses or organomegaly  GENITOURINARY: External genitalia and urethral meatus pink without lesions, masses, or excoriation. Vagina pink and moist without masses or lesions. Vaginal cuff without nodularity, masses, or lesions. Cervix surgically absent. Bimanual exam reveals no masses or fullness. Rectovaginal exam confirms these findings.  LYMPHATIC: Cervical, supraclavicular, and inguinal nodes without lymphadenopathy  MUSCULOSKELETAL: Moves all extremities, no obvious muscle wasting  NEUROLOGIC: No gross deficits, normal gait  SKIN: Appropriate color for race, warm and dry, no rashes or lesions to unclothed skin  PSYCHIATRIC: Pleasant and interactive, affect bright, makes appropriate eye contact, thought process linear      Data:   11    Assessment/Plan:  1) Stage IC2 clear cell ovarian carcinoma: No signs of recurrence. Continue surveillance every three months x2 years (through 10/2019) followed by every six months x3 years (through 10/2022) then annually thereafter with  and pelvic/rectal exam. Continue q6wk port flushes, may consider removal after next visit if she would like. Reviewed signs and symptoms  of recurrence including but not limited to bleeding from vagina, bladder, or rectum, bloating, early satiety, swelling in the lower extremities, abdominal or lower back pain, changes in bowel or bladder patterns, shortness of breath, increased fatigue,  unexplained weight loss, and night sweats. Reviewed signs and symptoms for when she should contact the clinic or seek additional care. Patient to contact the clinic with any questions or concerns in the interim. Given treatment summary and a copy of this was sent to her PCP, Morelia Ayon NP as well.  2) Recurrent UTIs: Asymptomatic, occurs after intercourse. Potentially related to genitourinary syndrome of menopause. I recommend against topical hormonal therapy given the clear cell histology of her ovarian cancer, though systemic absorption is likely minimal- consider using a water based lubricant, void post-intercourse, and drink at least 2L water daily. Follow up with PCP for recurrent UTIs and hematuria.  3) Weight gain: Discussed her pattern of weight gain- has gained 12lb since May 2018. Discussed importance of monitoring caloric intake and expending more calories than she takes in. Recommend that she restart Weight Watchers, expressed interest in this. Daily physical activity.  4) Pulmonary nodules: Stable on imaging 5/2018. Repeat chest CT 5/2019.  5) Genetic testing: Hafsa is negative for mutations in the AIP, ALK, APC, COSTA, BAP1, BARD1, BLM, BRCA1, BRCA2, BRIP1, BMPR1A, CDH1, CDK4, CDKN1B, CDKN2A, CHEK2, DICER1, EPCAM, FANCC, FH, FLCN, GALNT12, GREM1, HOXB13, MAX, MEN1, MET, MITF, MLH1, MRE11A, MSH2, MSH6, MUTYH, NBN, NF1, NF2, PALB2, PHOX2B, PMS2, POLD1, POLE, POT1, JLRDG1N, PTCH1, PTEN, RAD50, RAD51C, RAD51D, RB1, RET, SDHA, SDHAF2, SDHB, SDHC, SDHD, SMAD4, SMARCA4, SMARCB1, SMARCE1, STK11, SUFU, JTFI369, TP53, TSC1, TSC2, VHL, and XRCC2 genes. No mutations were found in any of the 67 genes analyzed.   6) Labs:  11  7) Health maintenance issues discussed today include to follow up with PCP for co-morbid conditions and non-gynecologic issues. Continue follow up with nephrology.  8) Patient verbalized understanding of and agreement with plan.    A total of 20 minutes of face to face time spent with  patient, over 50% of which was spent in counseling and coordination of care.    MARLON Ogden, FNP-C  Division of Gynecologic Oncology  [facility]  Pager: [phone]

## 2018-08-24 NOTE — MR AVS SNAPSHOT
"              After Visit Summary   8/24/2018    Hafsa Corona    MRN: [id number]           Patient Information     Date Of Birth          1955        Visit Information        Provider Department      8/24/2018 2:00 PM Augustina Noriega APRN [facility]         Follow-ups after your visit        Follow-up notes from your care team     Return in about 3 months (around 11/24/2018).      Your next 10 appointments already scheduled     Sep 06, 2018 11:30 AM CDT   Cancer Rehab Treatment with Lina Cheung, PT   [facility], [place], Physical Therapy - Outpatient ([facility])    [address], [address]  [place] [address]   [phone]            Sep 13, 2018  3:00 PM CDT   Cancer Rehab Treatment with Chelsi Turner, OT   [facility], [place], Occupational Therapy - Outpatient ([facility])    [address], [address]  [place] [address]   [phone]            Sep 21, 2018 12:00 PM CDT   (Arrive by 11:45 AM)   Return Visit with MARLON Chiu [facility] ([facility])    [address]  [address]  [facility] [id number] [phone]            Sep 21, 2018 12:30 PM CDT   MA SCREENING BILATERAL W/ THAIS with [email or web address]   [facility] ([facility])    [address]  [address]  [facility] [id number] [phone]           Three-dimensional (3D) mammograms are available at [place] locations in [facility], [place], [place], [facility], [place], [place], and [place]. [facility] locations include [place] [facility] in [place]. Benefits of 3D mammograms include: - Improved rate of cancer detection - Decreases your chance of having to go back for more tests, which means fewer: - \"False-positive\" results (This means that there is an abnormal " area but it isn't cancer.) - Invasive testing procedures, such as a biopsy or surgery - Can provide clearer images of the breast if you have dense breast tissue. 3D mammography is an optional exam that anyone can have with a 2D mammogram. It doesn't replace or take the place of a 2D mammogram. 2D mammograms remain an effective screening test for all women.  Not all insurance companies cover the cost of a 3D mammogram. Check with your insurance.            Sep 27, 2018  3:00 PM CDT   Cancer Rehab Treatment with Chelsi Turner OT   [facility], [place], Occupational Therapy - Outpatient ([facility])    [address]  [place] [address]   [phone]            Oct 22, 2018 10:00 AM CDT   RETURN GENERAL with Mary Jo Massey MD   [facility] ([facility])    [address]  [facility] [address]   [phone]            Dec 06, 2018  3:00 PM CST   [facility] Draw with  [facility] DRAW   [facility] Lab Draw ([facility])    [address]  [address]  [facility] [id number] [phone]            Dec 07, 2018  2:00 PM CST   (Arrive by 1:45 PM)   Return Visit with MARLON Ogden CNP   [facility] ([facility])    [address]  [address]  [facility] [id number] [phone]              Who to contact     If you have questions or need follow up information about today's clinic visit or your schedule please contact [facility] directly at [phone].  Normal or non-critical lab and imaging results will be communicated to you by MyChart, letter or phone within 4 business days after the clinic has received the results. If you do not hear from us within 7 days, please contact the clinic through MyChart or phone. If you have a critical or abnormal lab result, we will notify you  "by phone as soon as possible.  Submit refill requests through [email or web address] or call your pharmacy and they will forward the refill request to us. Please allow 3 business days for your refill to be completed.          Additional Information About Your Visit        WorkbooksharRingio Information     [email or web address] gives you secure access to your electronic health record. If you see a primary care provider, you can also send messages to your care team and make appointments. If you have questions, please call your primary care clinic.  If you do not have a primary care provider, please call [phone] and they will assist you.        Care EveryWhere ID     This is your Care EveryWhere ID. This could be used by other organizations to access your [facility] medical records  [id number]        Your Vitals Were     Pulse Temperature Respirations Height Last Period Pulse Oximetry    87 98.2  F (36.8  C) (Oral) 18 1.6 m (5' 2.99\") 01/01/2009 95%    BMI (Body Mass Index)                   36.5 kg/m2            Blood Pressure from Last 3 Encounters:   08/24/18 100/65   08/03/18 112/72   07/29/18 132/84    Weight from Last 3 Encounters:   08/24/18 93.4 kg (206 lb)   08/03/18 91.2 kg (201 lb)   07/29/18 90.7 kg (200 lb)              Today, you had the following     No orders found for display       Primary Care Provider Office Phone # Fax #    Morelia JAVIER Ayon [phone]       [address] [facility] [address]        Equal Access to Services     [facility] Center[facility] : Hadii aad ku hadasho Soomaali, waaxda luqadaha, qaybta kaalmada adeegyada, sujatha betts . So [facility] [phone].    ATENCIÓN: Si habla español, tiene a martínez disposición servicios gratuitos de asistencia lingüística. Llame al [phone].    We comply with applicable federal civil rights laws and [place] laws. We do not discriminate on the basis of race, color, national origin, age, disability, sex, sexual orientation, or gender identity.       "      Thank you!     Thank you for choosing [facility]  for your care. Our goal is always to provide you with excellent care. Hearing back from our patients is one way we can continue to improve our services. Please take a few minutes to complete the written survey that you may receive in the mail after your visit with us. Thank you!             Your Updated Medication List - Protect others around you: Learn how to safely use, store and throw away your medicines at www.disposemymeds.org.          This list is accurate as of 8/24/18  2:58 PM.  Always use your most recent med list.                   Brand Name Dispense Instructions for use Diagnosis    atorvastatin 80 MG tablet    LIPITOR    90 tablet    TAKE 1 TABLET(80 MG) BY MOUTH DAILY    Hyperlipidemia LDL goal <100       blood glucose lancets standard    no brand specified    100 each    Use to test blood sugar 2 times daily or as directed.    Type 2 diabetes mellitus without complication, without long-term current use of insulin (H)       blood glucose monitoring meter device kit    no brand specified    1 kit    Use to test blood sugar 2 times daily or as directed.    Type 2 diabetes mellitus without complication, without long-term current use of insulin (H)       blood glucose monitoring test strip    no brand specified    100 strip    Use to test blood sugars 2 times daily or as directed    Type 2 diabetes mellitus without complication, without long-term current use of insulin (H)       CALCIUM-MAGNESIUM-VITAMIN D PO      Take 2 tablets by mouth daily        EPINEPHrine 0.3 MG/0.3ML injection 2-pack    EPIPEN/ADRENACLICK/or ANY BX GENERIC EQUIV    0.3 mL    Inject 0.3 mLs (0.3 mg) into the muscle once as needed for anaphylaxis    Bee allergy status       escitalopram 20 MG tablet    LEXAPRO    30 tablet    Take 1 tablet (20 mg) by mouth daily    Major depressive disorder, recurrent episode, mild (H)       EYE DROPS OP      OTC saline eye  drops        fluticasone 50 MCG/ACT spray    FLONASE    48 mL    SHAKE WELL AND USE 2 SPRAYS IN EACH NOSTRIL DAILY    Chronic maxillary sinusitis       lisinopril 10 MG tablet    PRINIVIL/ZESTRIL    90 tablet    Take 1 tablet (10 mg) by mouth daily    Essential hypertension       loratadine 10 MG tablet    CLARITIN    30 tablet    Take 1 tablet (10 mg) by mouth daily    Pain in joint, multiple sites       LORazepam 1 MG tablet    ATIVAN    30 tablet    Take 1 tablet (1 mg) by mouth every 6 hours as needed (nausea/vomiting, anxiety or sleep)    Ovarian cancer, left (H), Encounter for long-term (current) use of medications       MAGNESIA PO      Take 500 mg by mouth        metFORMIN 500 MG 24 hr tablet    GLUCOPHAGE-XR    180 tablet    TAKE 2 TABLETS BY MOUTH ONCE DAILY WITH EVENING MEAL    Type 2 diabetes mellitus without complication, without long-term current use of insulin (H)       MULTIVITAMIN ADULT PO      Take 1 tablet by mouth daily        order for DME     1 Units    Home blood pressure monitor    Type 2 diabetes mellitus without complication, without long-term current use of insulin (H)       PRO-BIOTIC BLEND PO      Take 1 capsule by mouth daily Enzymatic Therapy-probiotic pearls        rivaroxaban ANTICOAGULANT 20 MG Tabs tablet    XARELTO    30 tablet    Take 1 tablet (20 mg) by mouth daily (with dinner)    Long-term (current) use of anticoagulants, Acute deep vein thrombosis (DVT) of left lower extremity, unspecified vein (H)       traZODone 50 MG tablet    DESYREL    180 tablet    TAKE 1 TO 2 TABLETS(50  MG) BY MOUTH EVERY NIGHT AS NEEDED FOR SLEEP    Insomnia, unspecified type       VITAMIN B-COMPLEX PO           vitamin D 1000 units capsule      Take 2,000 Units by mouth daily

## 2018-08-24 NOTE — NURSING NOTE
"Oncology Rooming Note    August 24, 2018 2:02 PM   Hafsa Corona is a 63 year old female who presents for:    Chief Complaint   Patient presents with     Oncology Clinic Visit     Return visit related to Renal Cell Carcinoma     Initial Vitals: /65 (BP Location: Right arm, Patient Position: Sitting, Cuff Size: Adult Regular)  Pulse 87  Temp 98.2  F (36.8  C) (Oral)  Resp 18  Ht 1.6 m (5' 2.99\")  Wt 93.4 kg (206 lb)  LMP 01/01/2009  SpO2 95%  BMI 36.5 kg/m2 Estimated body mass index is 36.5 kg/(m^2) as calculated from the following:    Height as of this encounter: 1.6 m (5' 2.99\").    Weight as of this encounter: 93.4 kg (206 lb). Body surface area is 2.04 meters squared.  No Pain (0) Comment: Data Unavailable   Patient's last menstrual period was 01/01/2009.  Allergies reviewed: Yes  Medications reviewed: Yes    Medications: Medication refills not needed today.  Pharmacy name entered into [email or web address]: [id number] - [place], [place] - [address] AT [facility] [address]    Clinical concerns: No new concerns. Provider was notified.    10 minutes for nursing intake (face to face time)     Virginia Hamilton LPN            "

## 2018-08-24 NOTE — LETTER
2018       RE: Hafsa Corona  [address]  [place] [address]     Dear Colleague,    Thank you for referring your patient, Hafsa Corona, to the [facility]. Please see a copy of my visit note below.    Gynecologic Oncology Follow-Up Visit    RE: Hafsa Corona  MRN: [id number]  : 1955  Date of Visit: 2018    CC: Hafsa Corona  is a 63 year old female with a history of stage IC2 clear cell ovarian carcinoma. She completed treatment with surgery and three cycles of chemotherapy on 10/17/17. She presents today for a three month surveillance visit.    HPI: Hafsa comes to the clinic feeling well from a gynecologic perspective. Reports occasional UTIs post-intercourse- has been voiding post-coitus. Having a recheck of hematuria with her PCP. Denies sexual concerns, does not use lubricant. Fatigue has improved with cancer rehab and she is working on increase her hours at work. She is aware that she has gained 12lb in the past three months- attributes this to her love of sweets. Has done Weight Watchers in the past and is considering restarting this. Doing exercises from cancer rehab. UTD on seeing her PCP, colonoscopy, and breast screening. She sees an ophthalmologist and recently had cataract surgery. Having her port flushed every six weeks. Had chest CT at [place] 2018 which demonstrated stability of pulmonary nodules. Denies unintended weight loss, fatigue, weakness, changes in vision or hearing, shortness of breath, cough, chest pain, abdominal pain, dyspepsia, nausea, vomiting, constipation, bloating, dysuria, urinary frequency or urgency, pelvic pain, lower back pain, vaginal bleeding, vaginal discharge, or numbness/tingling. 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       Oncology History:  The patient has had a recent episode of lower abdominal pain in early July.  She was subsequently sent to the emergency room and was found to have a  large 9 cm complex ovarian mass. She was admitted to the hospital for pain control.  7/3/17:  1187  07/07/17: Exploratory laparotomy, total abdominal hysterectomy, left salpingo-oophorectomy, cancer staging including infracolic omentectomy, bilateral pelvic & para-aortic lymphadenectomy, peritoneal biopsies, evacuation of pelvic fluid by Dr. Cole.    FINAL DIAGNOSIS:   A. LEFT OVARY AND FALLOPIAN TUBE, LEFT SALPINGO-OOPHORECTOMY:   - Ovarian clear cell carcinoma   - Background of endometriosis   - Fallopian tube with no significant histologic abnormality.   B. UTERUS, HYSTERECTOMY:   -  Inactive endometrium   -  Myometrium with adenomyosis and leiomyoma.   -  Cervix with squamous metaplasia.   C. PERITONEUM, RIGHT PELVIS, BIOPSY:   - Fibroadipose tissue, negative for malignancy.   D. LYMPH NODES, RIGHT PELVIC, DISSECTION:   - Thirteen benign lymph nodes (0/13).   E. LYMPH NODES, LEFT PELVIC, DISSECTION:   - Seven benign lymph nodes (0/7).   F. PERITONEUM, LEFT PELVIS, BIOPSY:   - Fibroadipose tissue, negative for malignancy.   G. PERITONEUM, BLADDER, BIOPSY:   - Fibroadipose tissue  with acute and chronic inflammation, negative for malignancy.   H. PERITONEUM, POSTERIOR CUL-DE-SAC, BIOPSY:   - Fibroadipose tissue, negative for malignancy.   I. PERITONEUM, LEFT PARACOLIC GUTTER, BIOPSY:   - Fibroadipose tissue, negative for malignancy.   J. LYMPH NODES, LEFT PARA-AORTIC, DISSECTION:   - Five benign lymph nodes (0/5).   K. LYMPH NODES, RIGHT PARA-AORTIC, DISSECTION:   - Two benign lymph nodes (0/2).   L. PERITONEUM, RIGHT PARACOLIC GUTTER, BIOPSY:   - Fibroadipose tissue, negative for malignancy.   M. OMENTUM, OMENTECTOMY:   - Adipose tissue with reactive changes, negative for malignancy.   COMMENT:   The final diagnosis confirms the interpretation provided intraoperatively.   Report Name: Ovary or Fallopian Tube   Status: Submitted   Part(s) Involved:   A: Ovary and fallopian tube, left   Synoptic Report:    CLINICAL   Clinical History:   - Pelvic mass   SPECIMEN   Procedure:   - Left salpingo-oophorectomy   - Hysterectomy   - Omentectomy   - Peritoneal  biopsies   Lymph Node Sampling:   - Performed   Location:   - Pelvic lymph nodes   - Para-aortic lymph nodes   Specimen Integrity:   - Left ovary   Specimen Integrity of Left Ovary:   - Capsule intact   TUMOR   Primary Tumor Site(s):   - Left ovary   Left Ovary   Tumor Size of Left Ovary: 19 cm   Histologic Type:   - Clear cell carcinoma   Tumor Extent   Ovarian Surface Involvement:   - Absent   Specimen(s)   Extent of Left Ovary:   - Involved   Extent of Left Fallopian Tube:   - Not involved   Extent of Omentum:   - Not involved   Extent of Uterus:   - Not involved   Extent of Peritoneum:   - Not involved   Peritoneal Ascitic Fluid:   - Not performed / unknown   Pleural Fluid:   - Not performed / unknown   LYMPH NODES     Number of Pelvic Lymph Nodes Examined: 20     Number of Pelvic Lymph Nodes Involved: None identified     Number of Para-aortic Lymph Nodes Examined: 7     Number of Para-Aortic Lymph Nodes Involved: None identified   STAGE (PTNM, AJCC 7TH ED.)   Primary Tumor (pT):   - Ovary   Ovarian Primary Tumor (pT):   - pT1a: Tumor limited to 1 ovary; capsule intact, no tumor on ovarian surface. No malignant cells in ascites or peritoneal washings#   Regional Lymph Nodes (pN):   - pN0: No regional lymph node metastasis       07/31/17: Dr Shaffer follow up: Discussed with the patient that given the high risk histology, as well as ruptured mass before surgery, she would be qualified at higher risk of recurrence despite early stage ovarian cancer.  Recommended adjuvant chemotherapy. Plan for carboplatin of AUC of 6 and paclitaxel of 175, m2 for 3 cycles. Referral for genetic counselor and will see her back after those 3 cycles  08/03/17: Call from patient stating she is going for a second opinion and would like chemotherapy rescheduled to week of  8/14/17.      08/16/17: Cycle #1 Carbo/Taxol.  73. Significant paclitaxel reaction.  08/30/17: C1 carboplatin/inpatient paclitaxel desensitization.   09/25/17: C2 carboplatin/paclitaxel- inpatient paclitaxel desensitization.  15.  10/16/17: C3 carboplatin/paclitaxel- switched to docetaxel given her neuropathy.  10.  11/14/17:  8. CT CAP:  IMPRESSION:   1. Post surgical changes of hysterectomy and bilateral  salpingo-oophorectomy. Nodular soft tissue density in the posterior  cul-de-sac along with ill-defined nodular thickening in the left  pelvis, suspicious for residual disease or metastatic deposits.   2. There is a 3 cm mass in the superior pole of the right kidney,  possibly extending into the renal hilum. Represents RCC until proven  otherwise. Consider renal mass protocol CT to assess renal vein  involvement.  3. Stable sub-4 mm pulmonary nodules, continued attention on  follow-up.    11/16/17: CT renal mass protocol  IMPRESSION:  1. Arterially enhancing mass measuring 3.6 x 2.1 x 2.2 cm mass arising  from superior posterior of the right kidney, this is renal cell  carcinoma until proven otherwise.  2. Stable fat-containing umbilical hernia.    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 Margins negative. Three month surveillance plan with [place].     2/26/18:  14    5/22/18:  11     8/23/18:  11    Past Medical History:   Diagnosis Date     Anxiety state, unspecified     [id number]     Arthritis 2014    vague, gradual, not treated really, knees feel it     Attention deficit disorder without mention of hyperactivity      Cancer (H) 7/2017 and 1/2018    ovarian and kidney cancers, both treated well     Chronic rhinitis      Depressive disorder lifetime    plus Anxiety plus ADD. Escitalipram, therapy, ADD meds maybe     Depressive disorder, not elsewhere classified      Heart disease 1999    tachycardia and high cholesterol, managed      History of blood transfusion 7/2017    while in hospital for ovarian cancer     Hypertension 1999    tho BP was too LOW last week. past 12 years Generally OK     Panic disorder without agoraphobia      Pure hypercholesterolemia     Lipitor     Tachycardia      Tachycardia, unspecified     9/1999-2/2004     Type II or unspecified type diabetes mellitus without mention of complication, not stated as uncontrolled     diagnosed 2004       Past Surgical History:   Procedure Laterality Date     AS REMV KIDNEY,RADICAL Right      BIOPSY  7/2017 and 2/2018    ovarian and kidney cancer biopsies. also 1987 breast benign     BREAST SURGERY  1987    date unsure. benign breast cyst removed     CATARACT IOL, RT/LT Left 05/18/2018     CATARACT IOL, RT/LT Right 07/1318     COLONOSCOPY  2008 and 2013    polyps removed. Next due fall 2018     HYSTERECTOMY TOTAL ABDOMINAL, BILATERAL SALPINGO-OOPHORECTOMY, NODE DISSECTION, COMBINED Left 7/7/2017    Procedure: COMBINED HYSTERECTOMY TOTAL ABDOMINAL, SALPINGO-OOPHORECTOMY, NODE DISSECTION;  Exploratory Laparotomy, Left Salpingo-Oophorectomy, Cancer Staging, Total Hysterectomy, Omentectomy, Evacuation of abdominal fluid, Lymph Node Dissection  Anesthesia Block ;  Surgeon: Serena Cole MD;  Location: [facility] OR     HYSTERECTOMY, PAP NO LONGER INDICATED  07/07/2017    Laparotomy; for ovarian cancer staging     HYSTERECTOMY, PAP NO LONGER INDICATED       INSERT PORT VASCULAR ACCESS Right 8/21/2017    Procedure: INSERT PORT VASCULAR ACCESS;  Single Lumen Chest Power Port;  Surgeon: Stephen Mike PA-C;  Location: [facility] OR     LYMPHADENECTOMY RETROPERITONEAL Bilateral 07/07/2017    Laparotomy; pelvics & para-aortics; for ovarian cancer staging     OMENTECTOMY  07/07/2017    Laparotomy; for ovarian cancer staging     ORTHOPEDIC SURGERY  1/2002    broken left jaw due to fall, 4 fractures, titanium plates     PHACOEMULSIFICATION CLEAR CORNEA WITH STANDARD INTRAOCULAR LENS IMPLANT Right  7/13/2018    Procedure: PHACOEMULSIFICATION CLEAR CORNEA WITH STANDARD INTRAOCULAR LENS IMPLANT;  RIght Eye Phacoemulsification Clear Cornea with Standard Intraocular Lens Placement ;  Surgeon: Mary Jo Massey MD;  Location: [facility] OR     PHACOEMULSIFICATION CLEAR CORNEA WITH TORIC INTRAOCULAR LENS IMPLANT Left 5/18/2018    Procedure: PHACOEMULSIFICATION CLEAR CORNEA WITH TORIC INTRAOCULAR LENS IMPLANT;  Left Eye Phacoemulsification with Toric Lens;  Surgeon: Mary Jo Massey MD;  Location: [facility] OR     SALPINGO OOPHORECTOMY,R/L/KAYY Right 2008    Salpingo Oophorectomy, RT     SALPINGO OOPHORECTOMY,R/L/KAYY Left 07/07/2017    Laparotomy; for ovarian cancer staging     SURGICAL HISTORY OF -       facial surgery d/t fall in 1/2002     SURGICAL HISTORY OF -       1985 removal of breast cyst     SURGICAL HISTORY OF -   2008    endometrial ablation       Social History     Social History     Marital status: Single     Spouse name: N/A     Number of children: N/A     Years of education: N/A     Occupational History      Self Employed.     Social History Main Topics     Smoking status: Never Smoker     Smokeless tobacco: Never Used     Alcohol use Yes      Comment: Very infrequent, a bit of wine or beer 2x per month maybe     Drug use: Yes     Special: Marijuana      Comment: infrequent, for celebrations only, maybe 8x per year     Sexual activity: Yes     Partners: Male     Birth control/ protection: None      Comment: long-time      Other Topics Concern     Parent/Sibling W/ Cabg, Mi Or Angioplasty Before 65f 55m? No     Social History Narrative       Family History   Problem Relation Age of Onset     C.A.D. Father      Diabetes Father      Later in his life, in his 70s     Hypertension Father      Cerebrovascular Disease Father      March 1984 or 85     Psychotic Disorder Father      Pancreatic Cancer Father      Coronary Artery Disease Father      diagnosed in his late 50s (age)     Hyperlipidemia Father       treated w statins     Other Cancer Father      pancreatic, ,  of this     Depression Father      Mental Illness Father      likely PTSD and depression     Obesity Father      C.A.D. Mother      Hypertension Mother      Breast Cancer Mother      2 times,  and ?     Psychotic Disorder Mother      Colon Cancer Mother      ?     Cancer Mother      Bone cancer     Coronary Artery Disease Mother      diagnosed in her late 70s (age)     Hyperlipidemia Mother      treated w. statins     Other Cancer Mother      bone/marrow, ,  of this     Depression Mother      Anxiety Disorder Mother      Mental Illness Mother      various undiagnosed types, but THERE     Obesity Mother      Prostate Problems Brother      cleared      Hypertension Brother      Hyperlipidemia Brother      unsure if treated w statins     Depression Brother      Anxiety Disorder Brother      Mental Illness Brother      undiagnosed but THERE     Obesity Brother      Psychotic Disorder Sister      x2     Neurologic Disorder Sister      Hypertension Sister      Hyperlipidemia Sister      treated w statins     Depression Sister      Anxiety Disorder Sister      Obesity Sister      Psychotic Disorder Brother      x2     Depression Brother      major depressive disorder and OCD     Anxiety Disorder Brother      Mental Illness Brother      not sure of diagnoses, treated     Hypertension Brother      Hyperlipidemia Brother      Psychotic Disorder Maternal Grandmother      ?     Coronary Artery Disease Maternal Grandmother       of heart attack age 84?     Depression Maternal Grandmother      Psychotic Disorder Maternal Grandfather      ?     Psychotic Disorder Paternal Grandmother      Schizophernia     Coronary Artery Disease Paternal Grandmother       of heart attack, age 85?     Depression Paternal Grandmother      severe, but recovered     Mental Illness Paternal Grandmother      possible bipolar or other     Psychotic Disorder  Paternal Grandfather      ?     Respiratory Paternal Grandfather       of emphysema and smoking     Psychotic Disorder Sister      Other - See Comments Sister      small kidney stone      Hypertension Sister      Hyperlipidemia Sister      treated w statins     Depression Sister      Anxiety Disorder Sister      Cancer - colorectal No family hx of      Glaucoma No family hx of      Macular Degeneration No family hx of        Current Outpatient Prescriptions   Medication     atorvastatin (LIPITOR) 80 MG tablet     atorvastatin (LIPITOR) 80 MG tablet     B Complex Vitamins (VITAMIN B-COMPLEX PO)     blood glucose (NO BRAND SPECIFIED) lancets standard     blood glucose monitoring (NO BRAND SPECIFIED) meter device kit     blood glucose monitoring (NO BRAND SPECIFIED) test strip     CALCIUM-MAGNESIUM-VITAMIN D PO     Cholecalciferol (VITAMIN D) 1000 UNIT capsule     EPINEPHrine (EPIPEN/ADRENACLICK/OR ANY BX GENERIC EQUIV) 0.3 MG/0.3ML injection 2-pack     escitalopram (LEXAPRO) 20 MG tablet     fluticasone (FLONASE) 50 MCG/ACT spray     lisinopril (PRINIVIL/ZESTRIL) 10 MG tablet     loratadine (CLARITIN) 10 MG tablet     LORazepam (ATIVAN) 1 MG tablet     Magnesium Hydroxide (MAGNESIA PO)     metFORMIN (GLUCOPHAGE-XR) 500 MG 24 hr tablet     Multiple Vitamins-Minerals (MULTIVITAMIN ADULT PO)     ofloxacin (OCUFLOX) 0.3 % ophthalmic solution     ofloxacin (OCUFLOX) 0.3 % ophthalmic solution     order for DME     order for DME     order for DME     order for DME     order for DME     order for DME     order for DME     prednisoLONE acetate (PRED FORTE) 1 % ophthalmic susp     prednisoLONE acetate (PRED FORTE) 1 % ophthalmic susp     Probiotic Product (PRO-BIOTIC BLEND PO)     rivaroxaban ANTICOAGULANT (XARELTO) 20 MG TABS tablet     Tetrahydrozoline HCl (EYE DROPS OP)     traZODone (DESYREL) 50 MG tablet     No current facility-administered medications for this visit.           Allergies   Allergen Reactions      Paclitaxel Anaphylaxis and Difficulty breathing     Wasps [Hornets] Anaphylaxis     Yellow Hornet Venom [Hornet Venom] Anaphylaxis and Swelling     Yellow Jacket Venom [Venomil Honey Bee] Anaphylaxis and Swelling     No Clinical Screening - See Comments      Taxol      Sulfa Drugs Hives       Review of Systems  General: Denies fatigue, changes in weight, weakness, appetite changes, night sweats, hot flashes, fever, chills, or difficulty sleeping  HEENT: Denies headaches, hair loss, spots or floaters, diplopia, masses, head injury, tinnitus, hearing loss, epistaxis, congestion, problems with teeth or gums, dysphonia, or dysphagia  Pulmonary: Denies cough, sputum, hemoptysis, shortness of breath, dyspnea on exertion, wheezing, or allergies  Cardiovascular: Denies chest pain, fainting, palpitations, murmurs, activity intolerance, edema, or high blood pressure  Gastrointestinal: Denies nausea, vomiting, constipation, diarrhea, abdominal pain, bloating, heartburn, melena, hematochezia, or jaundice  Genitourinary: + UTIs, hematuria. Denies dysuria, urinary urgency or frequency, cloudy or malodorous urine, incontinence, flank pain, pelvic pain, vaginal bleeding, vaginal discharge, or vaginal dryness  Sexual Function: Denies pain with intercourse, changes in libido, arousal difficulty, or changes in orgasm  Integumentary: Denies rashes, sores, changing moles, or scarring  Hematologic: Denies swollen lymph nodes, masses, easy bruising, or easy bleeding  Musculoskeletal: + arthralgias. Denies falls, back pain, myalgias, stiffness, muscle weakness or muscle cramps  Neurologic: Denies changes in memory, difficulty with walking, seizures, numbness and tingling, dizziness, or tremors  Psychiatric: Denies anxiety, depression, suicidal thoughts, or difficulty concentrating  Endocrine: Denies polydipsia, polyuria, temperature intolerance, or history of thyroid disease      OBJECTIVE:    Physical Exam:    /65 (BP Location:  "Right arm, Patient Position: Sitting, Cuff Size: Adult Regular)  Pulse 87  Temp 98.2  F (36.8  C) (Oral)  Resp 18  Ht 1.6 m (5' 2.99\")  Wt 93.4 kg (206 lb)  LMP 01/01/2009  SpO2 95%  BMI 36.5 kg/m2    CONSTITUTIONAL: Alert non-toxic appearing female in no acute distress  HEAD: Normocephalic, atraumatic  EYES: PERRLA; no scleral icterus  ENT: Oropharynx pink without lesions  NECK: Neck supple without palpable lymphadenopathy  RESPIRATORY: Lungs clear to auscultation, no increased work of breathing noted  CV: Regular rate and rhythm, S1S2, no clicks, murmurs, rubs, or gallops; bilateral lower extremities with trace edema, dorsalis pedis pulses 2+ bilaterally  GASTROINTESTINAL: Normoactive bowel sounds x4 quadrants, abdomen soft, non-distended, and non-tender to palpation without masses or organomegaly  GENITOURINARY: External genitalia and urethral meatus pink without lesions, masses, or excoriation. Vagina pink and moist without masses or lesions. Vaginal cuff without nodularity, masses, or lesions. Cervix surgically absent. Bimanual exam reveals no masses or fullness. Rectovaginal exam confirms these findings.  LYMPHATIC: Cervical, supraclavicular, and inguinal nodes without lymphadenopathy  MUSCULOSKELETAL: Moves all extremities, no obvious muscle wasting  NEUROLOGIC: No gross deficits, normal gait  SKIN: Appropriate color for race, warm and dry, no rashes or lesions to unclothed skin  PSYCHIATRIC: Pleasant and interactive, affect bright, makes appropriate eye contact, thought process linear      Data:   11    Assessment/Plan:  1) Stage IC2 clear cell ovarian carcinoma: No signs of recurrence. Continue surveillance every three months x2 years (through 10/2019) followed by every six months x3 years (through 10/2022) then annually thereafter with  and pelvic/rectal exam. Continue q6wk port flushes, may consider removal after next visit if she would like. Reviewed signs and symptoms  of recurrence " including but not limited to bleeding from vagina, bladder, or rectum, bloating, early satiety, swelling in the lower extremities, abdominal or lower back pain, changes in bowel or bladder patterns, shortness of breath, increased fatigue, unexplained weight loss, and night sweats. Reviewed signs and symptoms for when she should contact the clinic or seek additional care. Patient to contact the clinic with any questions or concerns in the interim. Given treatment summary and a copy of this was sent to her PCP, Morelia Ayon NP as well.  2) Recurrent UTIs: Asymptomatic, occurs after intercourse. Potentially related to genitourinary syndrome of menopause. I recommend against topical hormonal therapy given the clear cell histology of her ovarian cancer, though systemic absorption is likely minimal- consider using a water based lubricant, void post-intercourse, and drink at least 2L water daily. Follow up with PCP for recurrent UTIs and hematuria.  3) Weight gain: Discussed her pattern of weight gain- has gained 12lb since May 2018. Discussed importance of monitoring caloric intake and expending more calories than she takes in. Recommend that she restart Weight Watchers, expressed interest in this. Daily physical activity.  4) Pulmonary nodules: Stable on imaging 5/2018. Repeat chest CT 5/2019.  5) Genetic testing: Hafsa is negative for mutations in the AIP, ALK, APC, COSTA, BAP1, BARD1, BLM, BRCA1, BRCA2, BRIP1, BMPR1A, CDH1, CDK4, CDKN1B, CDKN2A, CHEK2, DICER1, EPCAM, FANCC, FH, FLCN, GALNT12, GREM1, HOXB13, MAX, MEN1, MET, MITF, MLH1, MRE11A, MSH2, MSH6, MUTYH, NBN, NF1, NF2, PALB2, PHOX2B, PMS2, POLD1, POLE, POT1, JZTCS0W, PTCH1, PTEN, RAD50, RAD51C, RAD51D, RB1, RET, SDHA, SDHAF2, SDHB, SDHC, SDHD, SMAD4, SMARCA4, SMARCB1, SMARCE1, STK11, SUFU, MNNC575, TP53, TSC1, TSC2, VHL, and XRCC2 genes. No mutations were found in any of the 67 genes analyzed.   6) Labs:  11  7) Health maintenance issues discussed today  include to follow up with PCP for co-morbid conditions and non-gynecologic issues. Continue follow up with nephrology.  8) Patient verbalized understanding of and agreement with plan.    A total of 20 minutes of face to face time spent with patient, over 50% of which was spent in counseling and coordination of care.    MARLON Ogden, FNP-C  Division of Gynecologic Oncology  [facility]  Pager: [phone]

## 2018-08-30 ENCOUNTER — TELEPHONE (OUTPATIENT)
Dept: NUTRITION | Facility: CLINIC | Age: 63
End: 2018-08-30

## 2018-08-30 NOTE — TELEPHONE ENCOUNTER
Oncology distress screening:    Called patient today per her request.  Left VM indicating reason for phone call.  Advised pt to return call to RD at her convenience.  RD contact information provided.    Iris Garces RD, LD  [facility]  [phone]  Pager: [phone]

## 2018-09-13 ENCOUNTER — HOSPITAL ENCOUNTER (OUTPATIENT)
Dept: OCCUPATIONAL THERAPY | Facility: CLINIC | Age: 63
Setting detail: THERAPIES SERIES
End: 2018-09-13
Attending: NURSE PRACTITIONER
Payer: COMMERCIAL

## 2018-09-13 ENCOUNTER — OFFICE VISIT (OUTPATIENT)
Dept: HEMATOLOGY | Facility: CLINIC | Age: 63
End: 2018-09-13
Attending: INTERNAL MEDICINE
Payer: COMMERCIAL

## 2018-09-13 VITALS
DIASTOLIC BLOOD PRESSURE: 76 MMHG | SYSTOLIC BLOOD PRESSURE: 112 MMHG | TEMPERATURE: 98 F | BODY MASS INDEX: 35 KG/M2 | WEIGHT: 205 LBS | HEIGHT: 64 IN | OXYGEN SATURATION: 96 % | RESPIRATION RATE: 12 BRPM | HEART RATE: 76 BPM

## 2018-09-13 DIAGNOSIS — Z86.718 PERSONAL HISTORY OF DVT (DEEP VEIN THROMBOSIS): Primary | ICD-10-CM

## 2018-09-13 DIAGNOSIS — Z79.899 ENCOUNTER FOR LONG-TERM (CURRENT) USE OF MEDICATIONS: ICD-10-CM

## 2018-09-13 PROBLEM — T45.1X5A CHEMOTHERAPY-INDUCED NEUROPATHY (H): Status: RESOLVED | Noted: 2017-10-16 | Resolved: 2018-09-13

## 2018-09-13 PROBLEM — D70.1 CHEMOTHERAPY-INDUCED NEUTROPENIA (H): Status: RESOLVED | Noted: 2017-09-25 | Resolved: 2018-09-13

## 2018-09-13 PROBLEM — T45.1X5A CHEMOTHERAPY-INDUCED NEUTROPENIA (H): Status: RESOLVED | Noted: 2017-09-25 | Resolved: 2018-09-13

## 2018-09-13 PROBLEM — I82.409 DEEP VEIN THROMBOSIS (DVT) (H): Status: RESOLVED | Noted: 2017-06-06 | Resolved: 2018-09-13

## 2018-09-13 PROBLEM — R19.00 PELVIC MASS IN FEMALE: Status: RESOLVED | Noted: 2017-07-03 | Resolved: 2018-09-13

## 2018-09-13 PROBLEM — G62.0 CHEMOTHERAPY-INDUCED NEUROPATHY (H): Status: RESOLVED | Noted: 2017-10-16 | Resolved: 2018-09-13

## 2018-09-13 PROCEDURE — 97535 SELF CARE MNGMENT TRAINING: CPT | Mod: GO | Performed by: OCCUPATIONAL THERAPIST

## 2018-09-13 PROCEDURE — 40000361 ZZHC STATISTIC OT CANCER REHAB VISIT: Performed by: OCCUPATIONAL THERAPIST

## 2018-09-13 PROCEDURE — 99214 OFFICE O/P EST MOD 30 MIN: CPT | Performed by: INTERNAL MEDICINE

## 2018-09-13 ASSESSMENT — PAIN SCALES - GENERAL: PAINLEVEL: NO PAIN (0)

## 2018-09-13 NOTE — PROGRESS NOTES
[facility]  [address] [place] [address], [facility] [address], [address], [place] [address]  Phone: [phone], Fax: [phone].      Outpatient Visit Note:    Patient: Hafsa Corona  MRN: [id number]  : 1955  CLIFF: Sep 13, 2018      Reason for Visit:  Follow up for distal DVT, cancer-associated    Forward History:  Presented 2017 with LLE pain and swelling, found to have a distal DVT  2017 switched from warfarin to rivaroxaban, plan for 3 months of therapy and consider duration of therapy  2017 found to have new ovarian mass and underwent ex lap for resection of what was found to have stage 1C2 clear cell carcinoma.  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  She has some residual fatigue from chemotherapy but is doing well and back to work.  She notes no bleeding associated with her use of anticoagulation.  She is concerned about cost of Xarelto as her insurance deductible will start again in January.  Otherwise she has no complaints.  Some residual swelling of the left calf that does not bother her.    Medications:  Current Outpatient Prescriptions   Medication Sig Dispense Refill     Acetaminophen (TYLENOL PO) Take 500 mg by mouth 2 tabs prn pain (monthly)       atorvastatin (LIPITOR) 80 MG tablet TAKE 1 TABLET(80 MG) BY MOUTH DAILY 90 tablet PRN     B Complex Vitamins (VITAMIN B-COMPLEX PO)        blood glucose (NO BRAND SPECIFIED) lancets standard Use to test blood sugar 2 times daily or as directed. 100 each 11     blood glucose monitoring (NO BRAND SPECIFIED) meter device kit Use to test blood sugar 2 times daily or as directed. 1 kit 0     blood glucose monitoring (NO BRAND  SPECIFIED) test strip Use to test blood sugars 2 times daily or as directed 100 strip PRN     CALCIUM-MAGNESIUM-VITAMIN D PO Take 2 tablets by mouth daily       Cholecalciferol (VITAMIN D) 1000 UNIT capsule Take 2,000 Units by mouth daily        DiphenhydrAMINE HCl (BENADRYL PO) Take 50 mg by mouth daily as needed (monthly)       EPINEPHrine (EPIPEN/ADRENACLICK/OR ANY BX GENERIC EQUIV) 0.3 MG/0.3ML injection 2-pack Inject 0.3 mLs (0.3 mg) into the muscle once as needed for anaphylaxis 0.3 mL PRN     escitalopram (LEXAPRO) 20 MG tablet Take 1 tablet (20 mg) by mouth daily 30 tablet PRN     fluticasone (FLONASE) 50 MCG/ACT spray SHAKE WELL AND USE 2 SPRAYS IN EACH NOSTRIL DAILY 48 mL PRN     lisinopril (PRINIVIL/ZESTRIL) 10 MG tablet Take 1 tablet (10 mg) by mouth daily 90 tablet PRN     LORazepam (ATIVAN) 1 MG tablet Take 1 tablet (1 mg) by mouth every 6 hours as needed (nausea/vomiting, anxiety or sleep) 30 tablet 1     Magnesium Hydroxide (MAGNESIA PO) Take 500 mg by mouth       metFORMIN (GLUCOPHAGE-XR) 500 MG 24 hr tablet TAKE 2 TABLETS BY MOUTH ONCE DAILY WITH EVENING MEAL 180 tablet PRN     Multiple Vitamins-Minerals (MULTIVITAMIN ADULT PO) Take 1 tablet by mouth daily       order for DME Home blood pressure monitor 1 Units 0     Probiotic Product (PRO-BIOTIC BLEND PO) Take 1 capsule by mouth daily Enzymatic Therapy-probiotic pearls       rivaroxaban ANTICOAGULANT (XARELTO) 20 MG TABS tablet Take 1 tablet (20 mg) by mouth daily (with dinner) 30 tablet 0     traZODone (DESYREL) 50 MG tablet TAKE 1 TO 2 TABLETS(50  MG) BY MOUTH EVERY NIGHT AS NEEDED FOR SLEEP 180 tablet 1     loratadine (CLARITIN) 10 MG tablet Take 1 tablet (10 mg) by mouth daily (Patient not taking: Reported on 8/24/2018) 30 tablet 1        Allergies:  Allergies   Allergen Reactions     Paclitaxel Anaphylaxis and Difficulty breathing     Wasps [Hornets] Anaphylaxis     Yellow Hornet Venom [Hornet Venom] Anaphylaxis and Swelling     Yellow  Jacket Venom [Venomil Honey Bee] Anaphylaxis and Swelling     No Clinical Screening - See Comments      Taxol      Wasp Venom Protein Swelling     mental disorientation, hives, yellow jackets, yellow hornets (per pt been through desensitization treatment)     Sulfa Drugs Hives and Rash       ROS:  A 14 point ROS is negative except as stated in the HPI    Objective:  Vitals: B/P: 124/84, T: 98.5, P: 83, R: Data Unavailable, Wt: 205 lbs 0 oz  Exam:   Gen: Appears well, no distress  HEENT: no scleral icterus or hemorrhage, no wet purpura, no lymphadenopathy  Ext: trace edema, she has some persistent anterior tibial hemosiderosis that is unchanged but no erythema. varicosities    Labs:  Results for HAFSA BRISENO (MRN [id number]) as of 9/13/2018 15:06   Ref. Range 7/9/2018 13:51   Creatinine Latest Ref Range: 0.52 - 1.04 mg/dL 1.19 (H)       Imaging:  none    Assessment:  In summary, Hafsa Briseno is a 62 year old woman with cancer-associated distal DVT, now in remission after treatment with surgery and chemotherapy but continues on secondary prophylaxis with rivaroxaban.    Plan:  1. Majority of today's visit was spent counseling the patient regarding cancer its association with risk for VTE.  2. She will continue rivaroxaban 20 mg daily as secondary prophylaxis for her cancer-associated distal DVT for another year.  At this point her surveillance interval starts increasing so I suggested we consider in Fall 2019 either:         - stopping all secondary prohylaxis      - switch from rivaroxaban to ASA (some benefit plus prophylaxis against stroke, which she has a strong family history of and worries about a great deal)      - continuing rivaroxaban another year  3. Discussed use of rivaroxaban co-pay assistance and to contact us if she has further questions.         The patient is given our center's contact information and is instructed to call if she should have any further questions or concerns.  Otherwise, we will  plan on seeing her back in 1 year.      Total Time Spent:  I spent a total of 25 minutes face-to-face with Hafsa Corona during today's office visit.  Over 50% of this time was spent counseling the patient and/or coordinating care regarding cancer-associated VTE.      William Villarreal MD   of Medicine  [facility]

## 2018-09-13 NOTE — PATIENT INSTRUCTIONS
1. Continue daily rivaroxaban 20 mg daily until your next visit  2. Call us if your copay assistance program needs updating: your Jamie copay should be $10 and if not please call us  3. Next year we could consider either stopping altogether or switch from rivaroxaban to aspirin 81mg daily

## 2018-09-13 NOTE — MR AVS SNAPSHOT
After Visit Summary   9/13/2018    Hafsa Corona    MRN: [id number]           Patient Information     Date Of Birth          1955        Visit Information        Provider Department      9/13/2018 12:30 PM William Villarreal MD [facility]        Care Instructions    1. Continue daily rivaroxaban 20 mg daily until your next visit  2. Call us if your copay assistance program needs updating: your Jamie copay should be $10 and if not please call us  3. Next year we could consider either stopping altogether or switch from rivaroxaban to aspirin 81mg daily          Follow-ups after your visit        Follow-up notes from your care team     Return in about 1 year (around 9/13/2019).      Your next 10 appointments already scheduled     Sep 13, 2018  3:00 PM CDT   Cancer Rehab Treatment with Chelsi Turner OT   [facility], [place], Occupational Therapy - Outpatient ([facility])    [address], [address]  [place] [address]   [phone]            Sep 21, 2018 12:00 PM CDT   (Arrive by 11:45 AM)   Return Visit with MARLON Chiu [facility] ([facility])    [address]  [address]  [facility] [id number] [phone]            Sep 21, 2018 12:30 PM CDT   MA SCREENING BILATERAL W/ THAIS with [id number]   [facility] ([facility])    [address]  [facility] [id number] [phone]           Three-dimensional (3D) mammograms are available at [place] locations in [facility], [place], [place], [facility], [place], [place], and [place]. [facility] locations include [place] and Clinic & Surgery Center in [place]. Benefits of 3D mammograms include: - Improved rate of cancer detection - Decreases your chance of having to go back for more tests, which means fewer: -  "\"False-positive\" results (This means that there is an abnormal area but it isn't cancer.) - Invasive testing procedures, such as a biopsy or surgery - Can provide clearer images of the breast if you have dense breast tissue. 3D mammography is an optional exam that anyone can have with a 2D mammogram. It doesn't replace or take the place of a 2D mammogram. 2D mammograms remain an effective screening test for all women.  Not all insurance companies cover the cost of a 3D mammogram. Check with your insurance.            Sep 27, 2018  3:00 PM CDT   Cancer Rehab Treatment with Chelsi Turner OT   [facility], [place], Occupational Therapy - Outpatient ([facility])    [address]  [place] [address]   [phone]            Oct 22, 2018 10:00 AM CDT   RETURN GENERAL with Mary Jo Massey MD   [facility] Clinic ([facility])    [address]  [facility] [address] [phone]            Dec 06, 2018  3:00 PM CST   [facility] Lab Draw with  [facility] LAB DRAW   [facility] Lab Draw ([facility])    [address]  [address]  [facility] [id number] [phone]            Dec 07, 2018  2:00 PM CST   (Arrive by 1:45 PM)   Return Visit with MARLON Ogden CNP   [facility] ([facility])    [address]  [address]  [facility] [id number] [phone]              Who to contact     If you have questions or need follow up information about today's clinic visit or your schedule please contact [facility] directly at [phone].  Normal or non-critical lab and imaging results will be communicated to you by MyChart, letter or phone within 4 business days after the clinic has received the results. If you do not hear from us within 7 days, please contact the clinic through MyChart " "or phone. If you have a critical or abnormal lab result, we will notify you by phone as soon as possible.  Submit refill requests through [employer] or call your pharmacy and they will forward the refill request to us. Please allow 3 business days for your refill to be completed.          Additional Information About Your Visit        [employer] Information     [employer] gives you secure access to your electronic health record. If you see a primary care provider, you can also send messages to your care team and make appointments. If you have questions, please call your primary care clinic.  If you do not have a primary care provider, please call [phone] and they will assist you.        Care EveryWhere ID     This is your Care EveryWhere ID. This could be used by other organizations to access your [facility] medical records  [id number]        Your Vitals Were     Pulse Temperature Respirations Height Last Period Pulse Oximetry    76 98  F (36.7  C) (Oral) 12 1.613 m (5' 3.5\") 01/01/2009 96%    BMI (Body Mass Index)                   35.74 kg/m2            Blood Pressure from Last 3 Encounters:   09/13/18 112/76   08/24/18 100/65   08/03/18 112/72    Weight from Last 3 Encounters:   09/13/18 93 kg (205 lb)   08/24/18 93.4 kg (206 lb)   08/03/18 91.2 kg (201 lb)              Today, you had the following     No orders found for display       Primary Care Provider Office Phone # Fax #    [place] JAVIER Ayon [phone]       [address] [place] [address]        Equal Access to Services     [facility] : Hadii aad ku hadasho Soomaali, waaxda luqadaha, qaybta kaalmada adeegyafrancesca, waxWebTV mini betts . So [facility] [phone].    ATENCIÓN: Si habla español, tiene a martínez disposición servicios gratuitos de asistencia lingüística. Llame al [phone].    We comply with applicable federal civil rights laws and [place] laws. We do not discriminate on the basis of race, color, national " origin, age, disability, sex, sexual orientation, or gender identity.            Thank you!     Thank you for choosing [facility]  for your care. Our goal is always to provide you with excellent care. Hearing back from our patients is one way we can continue to improve our services. Please take a few minutes to complete the written survey that you may receive in the mail after your visit with us. Thank you!             Your Updated Medication List - Protect others around you: Learn how to safely use, store and throw away your medicines at www.disposemymeds.org.          This list is accurate as of 9/13/18  1:29 PM.  Always use your most recent med list.                   Brand Name Dispense Instructions for use Diagnosis    atorvastatin 80 MG tablet    LIPITOR    90 tablet    TAKE 1 TABLET(80 MG) BY MOUTH DAILY    Hyperlipidemia LDL goal <100       BENADRYL PO      Take 50 mg by mouth daily as needed (monthly)        blood glucose lancets standard    no brand specified    100 each    Use to test blood sugar 2 times daily or as directed.    Type 2 diabetes mellitus without complication, without long-term current use of insulin (H)       blood glucose monitoring meter device kit    no brand specified    1 kit    Use to test blood sugar 2 times daily or as directed.    Type 2 diabetes mellitus without complication, without long-term current use of insulin (H)       blood glucose monitoring test strip    no brand specified    100 strip    Use to test blood sugars 2 times daily or as directed    Type 2 diabetes mellitus without complication, without long-term current use of insulin (H)       CALCIUM-MAGNESIUM-VITAMIN D PO      Take 2 tablets by mouth daily        EPINEPHrine 0.3 MG/0.3ML injection 2-pack    EPIPEN/ADRENACLICK/or ANY BX GENERIC EQUIV    0.3 mL    Inject 0.3 mLs (0.3 mg) into the muscle once as needed for anaphylaxis    Bee allergy status       escitalopram 20 MG tablet     LEXAPRO    30 tablet    Take 1 tablet (20 mg) by mouth daily    Major depressive disorder, recurrent episode, mild (H)       fluticasone 50 MCG/ACT spray    FLONASE    48 mL    SHAKE WELL AND USE 2 SPRAYS IN EACH NOSTRIL DAILY    Chronic maxillary sinusitis       lisinopril 10 MG tablet    PRINIVIL/ZESTRIL    90 tablet    Take 1 tablet (10 mg) by mouth daily    Essential hypertension       loratadine 10 MG tablet    CLARITIN    30 tablet    Take 1 tablet (10 mg) by mouth daily    Pain in joint, multiple sites       LORazepam 1 MG tablet    ATIVAN    30 tablet    Take 1 tablet (1 mg) by mouth every 6 hours as needed (nausea/vomiting, anxiety or sleep)    Ovarian cancer, left (H), Encounter for long-term (current) use of medications       MAGNESIA PO      Take 500 mg by mouth        metFORMIN 500 MG 24 hr tablet    GLUCOPHAGE-XR    180 tablet    TAKE 2 TABLETS BY MOUTH ONCE DAILY WITH EVENING MEAL    Type 2 diabetes mellitus without complication, without long-term current use of insulin (H)       MULTIVITAMIN ADULT PO      Take 1 tablet by mouth daily        order for DME     1 Units    Home blood pressure monitor    Type 2 diabetes mellitus without complication, without long-term current use of insulin (H)       PRO-BIOTIC BLEND PO      Take 1 capsule by mouth daily Enzymatic Therapy-probiotic pearls        rivaroxaban ANTICOAGULANT 20 MG Tabs tablet    XARELTO    30 tablet    Take 1 tablet (20 mg) by mouth daily (with dinner)    Long-term (current) use of anticoagulants, Acute deep vein thrombosis (DVT) of left lower extremity, unspecified vein (H)       traZODone 50 MG tablet    DESYREL    180 tablet    TAKE 1 TO 2 TABLETS(50  MG) BY MOUTH EVERY NIGHT AS NEEDED FOR SLEEP    Insomnia, unspecified type       TYLENOL PO      Take 500 mg by mouth 2 tabs prn pain (monthly)        VITAMIN B-COMPLEX PO           vitamin D 1000 units capsule      Take 2,000 Units by mouth daily

## 2018-09-14 NOTE — PROGRESS NOTES
09/13/18 1500   Signing Clinician's Name / Credentials   Signing clinician's name / credentials Chelsi Turner OTR/L,ATP   Session Number   Session Number 9/10   Authorization status Discharge Summary   OT Goal 1   Goal Identifier Fatigue mgmt   Goal Description Patient will identify at least 3 methods to manage her fatigue/energy to demonstrate at least a 5 point improvement in her perceived level of fatigue as measured by the FACIT   Target Date 10/19/18   OT Goal 2   Goal Identifier Organizational problem solving   Goal Description Patient will independently demonstrate improved planning and problem solving with the use of mapping to breakdown and then sequence tasks with use of weekly planning tool to manage cognitive fatigue.    Target Date 10/19/18   Subjective Report   Subjective Report Thank you so much for all your help it has really helped.  I have been sharing all this info with my friends.   System Outcome Measures   FACIT Fatigue Subscale (score out of 52). The higher the score, the better the QOL. 38   Self Care/Home Management   Minutes 60 Minutes   Skilled Intervention Training in use of mapping in order to break down tasks and organize thoughts to completion   Patient Response Patient very receptive to all ideas.    Treatment Detail Review of progress towards goals.  Kashif reoprts some challenges recently with a death, end of therapies, and work challenges.  Dicussion and though processing through caring for herself in these times and looking for the positive in order to limit stress and fatigue.  Continued training and practice in use of mapping strategy in order to process and problem solve through upcoming presentation.  Assisted with creating a map for planning through next steps to complete prep for a sucessful completion of her presentation.   Progress goals met- no further Ot needs at this time   Education   Learner Patient   Readiness Acceptance   Method  Explanation;Booklet/handout;Demonstration   Response Verbalizes understanding;Demonstrates understanding   Comments   Comments Discharge Summary- Patient seen for 9 Ot sessions plus eval.  Focus was place on fatigue mgmt and patient completed all Andreina Sharad energy mgmt modules with great success and significant report of increased energy levels and understanding of how to manage deficits.  Trainign in use of mapping to break down tasks to assist with problem solivng and organizing also very helpful per reprot.  No further Ot needed at this time. Goals met.   Total Session Time   Timed Code Treatment Minutes 60   Total Treatment Time (sum of timed and untimed services) 60

## 2018-09-20 DIAGNOSIS — I82.402 ACUTE DEEP VEIN THROMBOSIS (DVT) OF LEFT LOWER EXTREMITY, UNSPECIFIED VEIN (H): ICD-10-CM

## 2018-09-20 DIAGNOSIS — Z79.01 LONG-TERM (CURRENT) USE OF ANTICOAGULANTS: ICD-10-CM

## 2018-09-21 ENCOUNTER — RADIANT APPOINTMENT (OUTPATIENT)
Dept: MAMMOGRAPHY | Facility: CLINIC | Age: 63
End: 2018-09-21
Payer: COMMERCIAL

## 2018-09-21 ENCOUNTER — ONCOLOGY VISIT (OUTPATIENT)
Dept: ONCOLOGY | Facility: CLINIC | Age: 63
End: 2018-09-21
Attending: CLINICAL NURSE SPECIALIST
Payer: COMMERCIAL

## 2018-09-21 VITALS
OXYGEN SATURATION: 97 % | HEIGHT: 64 IN | DIASTOLIC BLOOD PRESSURE: 103 MMHG | WEIGHT: 205.5 LBS | SYSTOLIC BLOOD PRESSURE: 137 MMHG | BODY MASS INDEX: 35.08 KG/M2 | RESPIRATION RATE: 18 BRPM | TEMPERATURE: 98.6 F | HEART RATE: 75 BPM

## 2018-09-21 DIAGNOSIS — Z80.3 FAMILY HISTORY OF MALIGNANT NEOPLASM OF BREAST: ICD-10-CM

## 2018-09-21 DIAGNOSIS — Z12.31 ENCOUNTER FOR SCREENING MAMMOGRAM FOR HIGH-RISK PATIENT: ICD-10-CM

## 2018-09-21 DIAGNOSIS — Z91.89 AT HIGH RISK FOR BREAST CANCER: ICD-10-CM

## 2018-09-21 DIAGNOSIS — Z12.39 BREAST CANCER SCREENING, HIGH RISK PATIENT: Primary | ICD-10-CM

## 2018-09-21 PROBLEM — D30.00 RENAL ONCOCYTOMA: Status: ACTIVE | Noted: 2018-05-16

## 2018-09-21 PROBLEM — L02.416 ABSCESS OF LEFT LOWER EXTREMITY: Status: ACTIVE | Noted: 2018-09-21

## 2018-09-21 PROBLEM — F32.A DEPRESSIVE DISORDER: Status: ACTIVE | Noted: 2018-09-21

## 2018-09-21 PROBLEM — F40.01 PANIC DISORDER WITH AGORAPHOBIA: Status: ACTIVE | Noted: 2018-09-21

## 2018-09-21 PROBLEM — I89.0 LYMPHEDEMA: Status: ACTIVE | Noted: 2017-07-25

## 2018-09-21 PROBLEM — F98.8 ADD (ATTENTION DEFICIT DISORDER) WITHOUT HYPERACTIVITY: Status: ACTIVE | Noted: 2018-09-21

## 2018-09-21 PROBLEM — D17.71 ANGIOMYOLIPOMA OF KIDNEY: Status: ACTIVE | Noted: 2018-05-16

## 2018-09-21 PROBLEM — F41.9 ANXIETY: Status: ACTIVE | Noted: 2018-09-21

## 2018-09-21 PROBLEM — D64.9 NORMOCYTIC ANEMIA: Status: ACTIVE | Noted: 2018-09-21

## 2018-09-21 PROCEDURE — 99213 OFFICE O/P EST LOW 20 MIN: CPT | Mod: ZP | Performed by: CLINICAL NURSE SPECIALIST

## 2018-09-21 PROCEDURE — G0463 HOSPITAL OUTPT CLINIC VISIT: HCPCS | Mod: ZF

## 2018-09-21 ASSESSMENT — PAIN SCALES - GENERAL: PAINLEVEL: NO PAIN (0)

## 2018-09-21 NOTE — PROGRESS NOTES
Oncology Risk Management Consultation:  Date on this visit: 2018    Hafsa Corona  [facility] for a  follow up visit. She requires evaluation for her risk of cancer secondary to having a family history of breast cancer in her mother at age 63. She is considered to at high risk for breast cancer and has a 23.5% lifetime risk for breast cancer by the SHERON model.     Primary Physician: MARLON Reilly NP    History Of Present Illness:  Ms. Corona is a very pleasant  63 year old female who presents with family history of breast cancer.     Genetic testin2017 - Negative for mutations in the AIP, ALK, APC, COSTA, BAP1, BARD1, BLM, BRCA1, BRCA2, BRIP1, BMPR1A, CDH1, CDK4, CDKN1B, CDKN2A, CHEK2, DICER1, EPCAM, FANCC, FH, FLCN, GALNT12, GREM1, HOXB13, MAX, MEN1, MET, MITF, MLH1, MRE11A, MSH2, MSH6, MUTYH, NBN, NF1, NF2, PALB2, PHOX2B, PMS2, POLD1, POLE, POT1, CTZOD3B, PTCH1, PTEN, RAD50, RAD51C, RAD51D, RB1, RET, SDHA, SDHAF2, SDHB, SDHC, SDHD, SMAD4, SMARCA4, SMARCB1, SMARCE1, STK11, SUFU, IFNT416, TP53, TSC1, TSC2, VHL, and XRCC2 genes using a Cancer-Next Expanded panel through [employer].    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   Nulliparous.  Menarche at 10.  Hx of OCPs x6 years.  No hx of HRT.  Density: heterogeneously fibroglandular tissue    Pertinent screening history:  2011 - Screening mammogram, normal by report   2014 - Screening mammogram, normal by report  9/15/2017 - Screening mammogram, BiRads1.  At this visit, she denies asymmetry, lumps, masses, thickening, pain, nipple discharge and skin changes.    2018 - Breast MRI, BiRads2.    At this visit, she is here for an exam prior to  her annual screening mammogram.  She states that checks her breasts periodically and denies new fatigue, breast pain, asymmetry, lumps, masses, thickening, nipple discharge and skin changes in her breasts.    Past Medical/Surgical History:  Past Medical History:   Diagnosis Date     Anxiety state, unspecified     [id number]     Arthritis 2014    vague, gradual, not treated really, knees feel it     Attention deficit disorder without mention of hyperactivity      Cancer (H) 7/2017 and 1/2018    ovarian and kidney cancers, both treated well     Chronic rhinitis      Depressive disorder lifetime    plus Anxiety plus ADD. Escitalipram, therapy, ADD meds maybe     Depressive disorder, not elsewhere classified      Heart disease 1999    tachycardia and high cholesterol, managed     History of blood transfusion 7/2017    while in hospital for ovarian cancer     Hypertension 1999    tho BP was too LOW last week. past 12 years Generally OK     Panic disorder without agoraphobia      Pure hypercholesterolemia     Lipitor     Tachycardia      Tachycardia, unspecified     9/1999-2/2004     Type II or unspecified type diabetes mellitus without mention of complication, not stated as uncontrolled     diagnosed 2004     Past Surgical History:   Procedure Laterality Date     AS REMV KIDNEY,RADICAL Right      BIOPSY  7/2017 and 2/2018    ovarian and kidney cancer biopsies. also 1987 breast benign     BREAST SURGERY  1987    date unsure. benign breast cyst removed     CATARACT IOL, RT/LT Left 05/18/2018     CATARACT IOL, RT/LT Right 07/1318     COLONOSCOPY  2008 and 2013    polyps removed. Next due fall 2018     HYSTERECTOMY TOTAL ABDOMINAL, BILATERAL SALPINGO-OOPHORECTOMY, NODE DISSECTION, COMBINED Left 7/7/2017    Procedure: COMBINED HYSTERECTOMY TOTAL ABDOMINAL, SALPINGO-OOPHORECTOMY, NODE DISSECTION;  Exploratory Laparotomy, Left Salpingo-Oophorectomy, Cancer Staging, Total Hysterectomy, Omentectomy, Evacuation of abdominal fluid,  Lymph Node Dissection  Anesthesia Block ;  Surgeon: Serena Cole MD;  Location: [facility] OR     HYSTERECTOMY, PAP NO LONGER INDICATED  07/07/2017    Laparotomy; for ovarian cancer staging     HYSTERECTOMY, PAP NO LONGER INDICATED       INSERT PORT VASCULAR ACCESS Right 8/21/2017    Procedure: INSERT PORT VASCULAR ACCESS;  Single Lumen Chest Power Port;  Surgeon: Stephen Mike PA-C;  Location: [facility] OR     LYMPHADENECTOMY RETROPERITONEAL Bilateral 07/07/2017    Laparotomy; pelvics & para-aortics; for ovarian cancer staging     OMENTECTOMY  07/07/2017    Laparotomy; for ovarian cancer staging     ORTHOPEDIC SURGERY  1/2002    broken left jaw due to fall, 4 fractures, titanium plates     PHACOEMULSIFICATION CLEAR CORNEA WITH STANDARD INTRAOCULAR LENS IMPLANT Right 7/13/2018    Procedure: PHACOEMULSIFICATION CLEAR CORNEA WITH STANDARD INTRAOCULAR LENS IMPLANT;  RIght Eye Phacoemulsification Clear Cornea with Standard Intraocular Lens Placement ;  Surgeon: Mary Jo Massey MD;  Location: [facility] OR     PHACOEMULSIFICATION CLEAR CORNEA WITH TORIC INTRAOCULAR LENS IMPLANT Left 5/18/2018    Procedure: PHACOEMULSIFICATION CLEAR CORNEA WITH TORIC INTRAOCULAR LENS IMPLANT;  Left Eye Phacoemulsification with Toric Lens;  Surgeon: Mary Jo Massey MD;  Location: [facility] OR     SALPINGO OOPHORECTOMY,R/L/KAYY Right 2008    Salpingo Oophorectomy, RT     SALPINGO OOPHORECTOMY,R/L/KAYY Left 07/07/2017    Laparotomy; for ovarian cancer staging     SURGICAL HISTORY OF -       facial surgery d/t fall in 1/2002     SURGICAL HISTORY OF -       1985 removal of breast cyst     SURGICAL HISTORY OF -   2008    endometrial ablation       Allergies:  Allergies as of 09/21/2018 - Trevin as Reviewed 09/13/2018   Allergen Reaction Noted     Paclitaxel Anaphylaxis and Difficulty breathing 08/30/2017     Wasps [hornets] Anaphylaxis 09/03/2009     Yellow hornet venom [hornet venom] Anaphylaxis and Swelling 05/10/2018     Yellow jacket venom [venomil  honey bee] Anaphylaxis and Swelling 05/10/2018     No clinical screening - see comments  11/01/2017     Wasp venom protein Swelling 01/18/2018     Sulfa drugs Hives and Rash 07/19/2005       Current Medications:  Current Outpatient Prescriptions   Medication Sig Dispense Refill     Acetaminophen (TYLENOL PO) Take 500 mg by mouth 2 tabs prn pain (monthly)       atorvastatin (LIPITOR) 80 MG tablet TAKE 1 TABLET(80 MG) BY MOUTH DAILY 90 tablet PRN     B Complex Vitamins (VITAMIN B-COMPLEX PO)        blood glucose (NO BRAND SPECIFIED) lancets standard Use to test blood sugar 2 times daily or as directed. 100 each 11     blood glucose monitoring (NO BRAND SPECIFIED) meter device kit Use to test blood sugar 2 times daily or as directed. 1 kit 0     blood glucose monitoring (NO BRAND SPECIFIED) test strip Use to test blood sugars 2 times daily or as directed 100 strip PRN     CALCIUM-MAGNESIUM-VITAMIN D PO Take 2 tablets by mouth daily       Cholecalciferol (VITAMIN D) 1000 UNIT capsule Take 2,000 Units by mouth daily        DiphenhydrAMINE HCl (BENADRYL PO) Take 50 mg by mouth daily as needed (monthly)       EPINEPHrine (EPIPEN/ADRENACLICK/OR ANY BX GENERIC EQUIV) 0.3 MG/0.3ML injection 2-pack Inject 0.3 mLs (0.3 mg) into the muscle once as needed for anaphylaxis 0.3 mL PRN     escitalopram (LEXAPRO) 20 MG tablet Take 1 tablet (20 mg) by mouth daily 30 tablet PRN     fluticasone (FLONASE) 50 MCG/ACT spray SHAKE WELL AND USE 2 SPRAYS IN EACH NOSTRIL DAILY 48 mL PRN     lisinopril (PRINIVIL/ZESTRIL) 10 MG tablet Take 1 tablet (10 mg) by mouth daily 90 tablet PRN     loratadine (CLARITIN) 10 MG tablet Take 1 tablet (10 mg) by mouth daily (Patient not taking: Reported on 8/24/2018) 30 tablet 1     LORazepam (ATIVAN) 1 MG tablet Take 1 tablet (1 mg) by mouth every 6 hours as needed (nausea/vomiting, anxiety or sleep) 30 tablet 1     Magnesium Hydroxide (MAGNESIA PO) Take 500 mg by mouth       metFORMIN (GLUCOPHAGE-XR) 500 MG  24 hr tablet TAKE 2 TABLETS BY MOUTH ONCE DAILY WITH EVENING MEAL 180 tablet PRN     Multiple Vitamins-Minerals (MULTIVITAMIN ADULT PO) Take 1 tablet by mouth daily       order for DME Home blood pressure monitor 1 Units 0     Probiotic Product (PRO-BIOTIC BLEND PO) Take 1 capsule by mouth daily Enzymatic Therapy-probiotic pearls       rivaroxaban ANTICOAGULANT (XARELTO) 20 MG TABS tablet Take 1 tablet (20 mg) by mouth daily (with dinner) 30 tablet 11     traZODone (DESYREL) 50 MG tablet TAKE 1 TO 2 TABLETS(50  MG) BY MOUTH EVERY NIGHT AS NEEDED FOR SLEEP 180 tablet 1        Family History:  Family History   Problem Relation Age of Onset     C.A.D. Father      Diabetes Father      Later in his life, in his 70s     Hypertension Father      Cerebrovascular Disease Father      1984 or      Psychotic Disorder Father      Pancreatic Cancer Father      Coronary Artery Disease Father      diagnosed in his late 50s (age)     Hyperlipidemia Father      treated w statins     Other Cancer Father      pancreatic, ,  of this     Depression Father      Mental Illness Father      likely PTSD and depression     Obesity Father      C.A.D. Mother      Hypertension Mother      Breast Cancer Mother      2 times,  and ?     Psychotic Disorder Mother      Colon Cancer Mother      ?     Cancer Mother      Bone cancer     Coronary Artery Disease Mother      diagnosed in her late 70s (age)     Hyperlipidemia Mother      treated w. statins     Other Cancer Mother      bone/marrow, ,  of this     Depression Mother      Anxiety Disorder Mother      Mental Illness Mother      various undiagnosed types, but THERE     Obesity Mother      Prostate Problems Brother      cleared      Hypertension Brother      Hyperlipidemia Brother      unsure if treated w statins     Depression Brother      Anxiety Disorder Brother      Mental Illness Brother      undiagnosed but THERE     Obesity Brother      Psychotic  Disorder Sister      x2     Neurologic Disorder Sister      Hypertension Sister      Hyperlipidemia Sister      treated w statins     Depression Sister      Anxiety Disorder Sister      Obesity Sister      Psychotic Disorder Brother      x2     Depression Brother      major depressive disorder and OCD     Anxiety Disorder Brother      Mental Illness Brother      not sure of diagnoses, treated     Hypertension Brother      Hyperlipidemia Brother      Psychotic Disorder Maternal Grandmother      ?     Coronary Artery Disease Maternal Grandmother       of heart attack age 84?     Depression Maternal Grandmother      Psychotic Disorder Maternal Grandfather      ?     Psychotic Disorder Paternal Grandmother      Schizophernia     Coronary Artery Disease Paternal Grandmother       of heart attack, age 85?     Depression Paternal Grandmother      severe, but recovered     Mental Illness Paternal Grandmother      possible bipolar or other     Psychotic Disorder Paternal Grandfather      ?     Respiratory Paternal Grandfather       of emphysema and smoking     Psychotic Disorder Sister      Other - See Comments Sister      small kidney stone      Hypertension Sister      Hyperlipidemia Sister      treated w statins     Depression Sister      Anxiety Disorder Sister      Cancer - colorectal No family hx of      Glaucoma No family hx of      Macular Degeneration No family hx of        Social History:  Social History     Social History     Marital status: Single     Spouse name: N/A     Number of children: N/A     Years of education: N/A     Occupational History      Self Employed.     Social History Main Topics     Smoking status: Never Smoker     Smokeless tobacco: Never Used     Alcohol use Yes      Comment: Very infrequent, a bit of wine or beer 2x per month maybe     Drug use: Yes     Special: Marijuana      Comment: infrequent, for celebrations only, maybe 8x per year     Sexual activity: Yes     Partners:  Male     Birth control/ protection: None      Comment: long-time      Other Topics Concern     Parent/Sibling W/ Cabg, Mi Or Angioplasty Before 65f 55m? No     Social History Narrative       Physical Exam:  LMP 01/01/2009    GENERAL APPEARANCE: healthy, alert and no distress     NECK: no adenopathy, no asymmetry or masses     LYMPHATICS: No cervical, supraclavicular or axillary lymphadenopathy     RESP: lungs clear to auscultation - no rales, rhonchi or wheezes     CARDIOVASCULAR: regular rate and rhythm, normal S1 S2, no S3 or S4 and no murmur.   BREAST: A multi positional, bilateral breast exam was performed.  Very symmetrical pendulous breasts, Lsl>R. Right breast: no palpable dominant masses, no nipple discharge, no skin changes. Fibrocystic changes throughout tissue.  Right axilla: no palpable adenopathy. Left breast: no palpable dominant masses, no nipple discharge, no skin changes. Left axilla: no palpable adenopathy. Fibrocystic changes throughout tissue.        SKIN: no suspicious lesions or rashes    Laboratory/Imaging Studies  Results for orders placed or performed in visit on 08/23/18      Result Value Ref Range     11 0 - 30 U/mL       ASSESSMENT  We discussed her interval history; she is doing well and will start back to full time work in October.  We reviewed her family history; there are no reported changes.  She has no breast complaints and her exam today was unremarkable.  She continues to follow up in the [facility] and will proceed with the following screening plan.     INDIVIDUALIZED CANCER RISK MANAGEMENT PLAN:  Individualized Surveillance Plan for women  With 20% or greater lifetime risk of breast cancer   Per NCCN Breast Cancer Screening and Diagnosis Guidelines Version 2.2018    Recommended screening Test or procedure Last done Next Scheduled    Clinical encounter Ongoing risk assessment, risk reduction counseling and clinical breast exam, every 6-12  months. Refer to genetic counseling if not already done.   9/21/2018 Sept. 2019   However, some family histories with breast cancers at a very young age, may warrant screening starting earlier.    *May begin at age 40 if breast cancers in the family occur at later ages.    Annual mammogram beginning 10 years younger than the earliest breast cancer in the family but not prior to age 30.    Recommend annual breast MRI to begin 10 years younger than the earliest breast cancer in the family but not prior to age 25.    Breast MRIs are preferably done on day 7-15 of the menstrual cycle in premenopausal women.   4/5/2018: Breast MRI, BiRads2    9/15/2017 : Screening mammogram, BiRads1   Mammogram today after appt.    Next breast MRI: April 2019    Next exam: Sept. 2019 followed by tomosynthesis mammogram   Breast screening for patients at high risk due to thoracic radiation before 30 years old    Begin 10 years post radiation treatment or age 25.   Annual mammogram beginning 10 years after radiation but not prior to age 30    Annual breast MRI, preferably on day 7-15 of menstrual cycle for premenopausal women.       NA     NA   Women who have a lifetime risk of >20% based on history of LCIS or ADH/ALH Annual screening mammogram beginning at age of LCIS or ADH/ALH but not prior to age 30.    Consider annual MRI to begin at age of diagnosis of LCIS or ADH/ALH but not prior to age 25.    Consider risk reducing strategies.     NA     NA    Recommend risk reducing strategies for women with 1.7% 5 year risk of breast cancer. 3.6% 5 year risk      I spent 20 minutes with the patient with greater that 50% of it in counseling and coordinating care as documented above.    Stephie Karimi, MARLON-CNS, OCN, AGN-BC  Clinical Nurse Specialist  Cancer Risk Management Program  [address]  [place] [address]    phone:  [phone]  Pager: [phone]  fax:  [phone]    CC: Morelia Ayon, APRN-NP

## 2018-09-21 NOTE — PATIENT INSTRUCTIONS
Individualized Surveillance Plan for women  With 20% or greater lifetime risk of breast cancer   Per NCCN Breast Cancer Screening and Diagnosis Guidelines Version 2.2018    Recommended screening Test or procedure Last done Next Scheduled    Clinical encounter Ongoing risk assessment, risk reduction counseling and clinical breast exam, every 6-12 months. Refer to genetic counseling if not already done.   9/21/2018 Sept. 2019   However, some family histories with breast cancers at a very young age, may warrant screening starting earlier.    *May begin at age 40 if breast cancers in the family occur at later ages.    Annual mammogram beginning 10 years younger than the earliest breast cancer in the family but not prior to age 30.    Recommend annual breast MRI to begin 10 years younger than the earliest breast cancer in the family but not prior to age 25.    Breast MRIs are preferably done on day 7-15 of the menstrual cycle in premenopausal women.   4/5/2018: Breast MRI, BiRads2    9/15/2017 : Screening mammogram, BiRads1   Mammogram today after appt.    Next breast MRI: April 2019    Next exam: Sept. 2019 followed by tomosynthesis mammogram   Breast screening for patients at high risk due to thoracic radiation before 30 years old    Begin 10 years post radiation treatment or age 25.   Annual mammogram beginning 10 years after radiation but not prior to age 30    Annual breast MRI, preferably on day 7-15 of menstrual cycle for premenopausal women.       NA     NA   Women who have a lifetime risk of >20% based on history of LCIS or ADH/ALH Annual screening mammogram beginning at age of LCIS or ADH/ALH but not prior to age 30.    Consider annual MRI to begin at age of diagnosis of LCIS or ADH/ALH but not prior to age 25.    Consider risk reducing strategies.     NA     NA    Recommend risk reducing strategies for women with 1.7% 5 year risk of breast cancer. 3.6% 5 year risk

## 2018-09-21 NOTE — Clinical Note
Please call her to make her appts. Thanks. Stephie Karimi, MARLON-CNS, OCN, AGN-BC Clinical Nurse Specialist Cancer Risk Management Program [address] [place] [address]  phone:  [phone] Pager: [phone] fax: [phone]

## 2018-09-21 NOTE — MR AVS SNAPSHOT
After Visit Summary   9/21/2018    Hafsa Corona    MRN: [id number]           Patient Information     Date Of Birth          1955        Visit Information        Provider Department      9/21/2018 12:00 PM Stephie Karimi APRN [facility]        Today's Diagnoses     Breast cancer screening, high risk patient    -  1    Family history of malignant neoplasm of breast          Care Instructions    Individualized Surveillance Plan for women  With 20% or greater lifetime risk of breast cancer   Per NCCN Breast Cancer Screening and Diagnosis Guidelines Version 2.2018    Recommended screening Test or procedure Last done Next Scheduled    Clinical encounter Ongoing risk assessment, risk reduction counseling and clinical breast exam, every 6-12 months. Refer to genetic counseling if not already done.   9/21/2018 Sept. 2019   However, some family histories with breast cancers at a very young age, may warrant screening starting earlier.    *May begin at age 40 if breast cancers in the family occur at later ages.    Annual mammogram beginning 10 years younger than the earliest breast cancer in the family but not prior to age 30.    Recommend annual breast MRI to begin 10 years younger than the earliest breast cancer in the family but not prior to age 25.    Breast MRIs are preferably done on day 7-15 of the menstrual cycle in premenopausal women.   4/5/2018: Breast MRI, BiRads2    9/15/2017 : Screening mammogram, BiRads1   Mammogram today after appt.    Next breast MRI: April 2019    Next exam: Sept. 2019 followed by tomosynthesis mammogram   Breast screening for patients at high risk due to thoracic radiation before 30 years old    Begin 10 years post radiation treatment or age 25.   Annual mammogram beginning 10 years after radiation but not prior to age 30    Annual breast MRI, preferably on day 7-15 of menstrual cycle for premenopausal women.       NA     NA   Women who  have a lifetime risk of >20% based on history of LCIS or ADH/ALH Annual screening mammogram beginning at age of LCIS or ADH/ALH but not prior to age 30.    Consider annual MRI to begin at age of diagnosis of LCIS or ADH/ALH but not prior to age 25.    Consider risk reducing strategies.     NA     NA    Recommend risk reducing strategies for women with 1.7% 5 year risk of breast cancer. 3.6% 5 year risk                Follow-ups after your visit        Follow-up notes from your care team     Return in about 1 year (around 9/21/2019) for Physical Exam.      Your next 10 appointments already scheduled     Oct 22, 2018 10:00 AM CDT   RETURN GENERAL with Mary Jo Massey MD   [facility] ([facility])    [address]  [facility] [address]   [phone]            Dec 06, 2018  3:00 PM CST   [facility] with  [facility]   [facility] ([facility])    [address] [place] [address]  [address]  [facility] [address]   [phone]            Dec 07, 2018  2:00 PM CST   (Arrive by 1:45 PM)   Return Visit with MARLON Ogden CNP   [facility] ([facility])    [address] [place] [address]  [address]  [facility] [address]   [phone]            Sep 12, 2019 11:00 AM CDT   RETURN CLOTTING DISORDER with William Villarreal MD   [facility] ([facility])    [facility] [place]  [address]  [facility] [address]   [phone]              Future tests that were ordered for you today     Open Future Orders        Priority Expected Expires Ordered    MA Screen Bilateral w/Micheal Routine 9/21/2019 9/22/2019 9/21/2018    MR Breast Bilateral w/o & w Contrast Routine 4/6/2019 9/22/2019 9/21/2018            Who to contact     If you have questions or need follow up information about today's clinic visit or your  "schedule please contact [facility] directly at [phone].  Normal or non-critical lab and imaging results will be communicated to you by MyChart, letter or phone within 4 business days after the clinic has received the results. If you do not hear from us within 7 days, please contact the clinic through [employer] or phone. If you have a critical or abnormal lab result, we will notify you by phone as soon as possible.  Submit refill requests through "[employer] or call your pharmacy and they will forward the refill request to us. Please allow 3 business days for your refill to be completed.          Additional Information About Your Visit        "[employer] Information     "[employer] gives you secure access to your electronic health record. If you see a primary care provider, you can also send messages to your care team and make appointments. If you have questions, please call your primary care clinic.  If you do not have a primary care provider, please call [phone] and they will assist you.        Care EveryWhere ID     This is your Care EveryWhere ID. This could be used by other organizations to access your [facility] medical records  [id number]        Your Vitals Were     Pulse Temperature Respirations Height Last Period Pulse Oximetry    75 98.6  F (37  C) (Tympanic) 18 1.613 m (5' 3.5\") 01/01/2009 97%    BMI (Body Mass Index)                   35.83 kg/m2            Blood Pressure from Last 3 Encounters:   09/21/18 (!) 137/103   09/13/18 112/76   08/24/18 100/65    Weight from Last 3 Encounters:   09/21/18 93.2 kg (205 lb 8 oz)   09/13/18 93 kg (205 lb)   08/24/18 93.4 kg (206 lb)               Primary Care Provider Office Phone # Fax #    Morelia Ayon [phone] [address] [facility] [address]        Equal Access to Services     RONEY ARANDA AH: Hadii aad ku hadasho Soomaali, waaxda luqadaha, qaybta kaalmasujatha otoole. " So [facility] [phone].    ATENCIÓN: Si melvin cristina, tiene a martínez disposición servicios gratuitos de asistencia lingüística. Elizabeth krause [phone].    We comply with applicable federal civil rights laws and [place] laws. We do not discriminate on the basis of race, color, national origin, age, disability, sex, sexual orientation, or gender identity.            Thank you!     Thank you for choosing [facility]  for your care. Our goal is always to provide you with excellent care. Hearing back from our patients is one way we can continue to improve our services. Please take a few minutes to complete the written survey that you may receive in the mail after your visit with us. Thank you!             Your Updated Medication List - Protect others around you: Learn how to safely use, store and throw away your medicines at www.disposemymeds.org.          This list is accurate as of 9/21/18  2:04 PM.  Always use your most recent med list.                   Brand Name Dispense Instructions for use Diagnosis    atorvastatin 80 MG tablet    LIPITOR    90 tablet    TAKE 1 TABLET(80 MG) BY MOUTH DAILY    Hyperlipidemia LDL goal <100       BENADRYL PO      Take 50 mg by mouth daily as needed (monthly)        blood glucose lancets standard    no brand specified    100 each    Use to test blood sugar 2 times daily or as directed.    Type 2 diabetes mellitus without complication, without long-term current use of insulin (H)       blood glucose monitoring meter device kit    no brand specified    1 kit    Use to test blood sugar 2 times daily or as directed.    Type 2 diabetes mellitus without complication, without long-term current use of insulin (H)       blood glucose monitoring test strip    no brand specified    100 strip    Use to test blood sugars 2 times daily or as directed    Type 2 diabetes mellitus without complication, without long-term current use of insulin (H)       CALCIUM-MAGNESIUM-VITAMIN D PO       Take 2 tablets by mouth daily        EPINEPHrine 0.3 MG/0.3ML injection 2-pack    EPIPEN/ADRENACLICK/or ANY BX GENERIC EQUIV    0.3 mL    Inject 0.3 mLs (0.3 mg) into the muscle once as needed for anaphylaxis    Bee allergy status       escitalopram 20 MG tablet    LEXAPRO    30 tablet    Take 1 tablet (20 mg) by mouth daily    Major depressive disorder, recurrent episode, mild (H)       fluticasone 50 MCG/ACT spray    FLONASE    48 mL    SHAKE WELL AND USE 2 SPRAYS IN EACH NOSTRIL DAILY    Chronic maxillary sinusitis       lisinopril 10 MG tablet    PRINIVIL/ZESTRIL    90 tablet    Take 1 tablet (10 mg) by mouth daily    Essential hypertension       loratadine 10 MG tablet    CLARITIN    30 tablet    Take 1 tablet (10 mg) by mouth daily    Pain in joint, multiple sites       LORazepam 1 MG tablet    ATIVAN    30 tablet    Take 1 tablet (1 mg) by mouth every 6 hours as needed (nausea/vomiting, anxiety or sleep)    Ovarian cancer, left (H), Encounter for long-term (current) use of medications       MAGNESIA PO      Take 500 mg by mouth        metFORMIN 500 MG 24 hr tablet    GLUCOPHAGE-XR    180 tablet    TAKE 2 TABLETS BY MOUTH ONCE DAILY WITH EVENING MEAL    Type 2 diabetes mellitus without complication, without long-term current use of insulin (H)       MULTIVITAMIN ADULT PO      Take 1 tablet by mouth daily        order for DME     1 Units    Home blood pressure monitor    Type 2 diabetes mellitus without complication, without long-term current use of insulin (H)       PRO-BIOTIC BLEND PO      Take 1 capsule by mouth daily Enzymatic Therapy-probiotic pearls        rivaroxaban ANTICOAGULANT 20 MG Tabs tablet    XARELTO    30 tablet    Take 1 tablet (20 mg) by mouth daily (with dinner)    Long-term (current) use of anticoagulants, Acute deep vein thrombosis (DVT) of left lower extremity, unspecified vein (H)       traZODone 50 MG tablet    DESYREL    180 tablet    TAKE 1 TO 2 TABLETS(50  MG) BY MOUTH EVERY  NIGHT AS NEEDED FOR SLEEP    Insomnia, unspecified type       TYLENOL PO      Take 500 mg by mouth 2 tabs prn pain (monthly)        VITAMIN B-COMPLEX PO           vitamin D 1000 units capsule      Take 2,000 Units by mouth daily

## 2018-09-21 NOTE — LETTER
Cancer Risk Management  Program Locations    [facility]  [facility]  [facility]  Mailing Address  Cancer Risk Management Program  [facility]  [address] [place] [address]    New patient appointments  [phone]  2018    Hafsa Corona  [address]  [place] [address]      Dear Hafsa,    It was a pleasure meeting with you today.  Below is a copy of my note from our visit, outlining your surveillance plan.      I look forward to seeing you in the future to coordinate your care and reduce your health risks. Please feel free to contact me if you have any questions or concerns.      Oncology Risk Management Consultation:  Date on this visit: 2018    [facility]afsa Cor[facility]  follow up visit. She requires evaluation for her risk of cancer secondary to having a family history of breast cancer in her mother at age 63. She is considered to at high risk for breast cancer and has a 23.5% lifetime risk for breast cancer by the SHERON model.      Primary Physician: MARLON Reilly NP    History Of Present Illness:  Ms. Corona is a very pleasant  63 year old female who presents with family history of breast cancer.     Genetic testin2017 - Negative for mutations in the AIP, ALK, APC, COSTA, BAP1, BARD1, BLM, BRCA1, BRCA2, BRIP1, BMPR1A, CDH1, CDK4, CDKN1B, CDKN2A, CHEK2, DICER1, EPCAM, FANCC, FH, FLCN, GALNT12, GREM1, HOXB13, MAX, MEN1, MET, MITF, MLH1, MRE11A, MSH2, MSH6, MUTYH, NBN, NF1, NF2, PALB2, PHOX2B, PMS2, POLD1, POLE, POT1, AYKFR0T, PTCH1, PTEN, RAD50, RAD51C, RAD51D, RB1, RET, SDHA, SDHAF2, SDHB, SDHC, SDHD, SMAD4, SMARCA4, SMARCB1, SMARCE1, STK11, SUFU, NMEE286, TP53, TSC1, TSC2, VHL, and XRCC2 genes using a Cancer-Next Expanded panel  through [employer].    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  2007- R salpingo oophorectomy for abnormal imaging (enlarged ovary), biopsy showed endometriosis  1/24/18: R nephrectomy with Dr. 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   Nulliparous.  Menarche at 10.  Hx of OCPs x6 years.  No hx of HRT.  Density: heterogeneously fibroglandular tissue    Pertinent screening history:  6/2011 - Screening mammogram, normal by report   2/1/2014 - Screening mammogram, normal by report  9/15/2017 - Screening mammogram, BiRads1.  At this visit, she denies asymmetry, lumps, masses, thickening, pain, nipple discharge and skin changes.    4/5/2018 - Breast MRI, BiRads2.    At this visit, she is here for an exam prior to her annual screening mammogram.  She states that checks her breasts periodically and denies new fatigue, breast pain, asymmetry, lumps, masses, thickening, nipple discharge and skin changes in her breasts.    Past Medical/Surgical History:  Past Medical History:   Diagnosis Date     Anxiety state, unspecified     [id number]     Arthritis 2014    vague, gradual, not treated really, knees feel it     Attention deficit disorder without mention of hyperactivity      Cancer (H) 7/2017 and 1/2018    ovarian and kidney cancers, both treated well     Chronic rhinitis      Depressive disorder lifetime    plus Anxiety plus ADD. Escitalipram, therapy, ADD meds maybe     Depressive disorder, not elsewhere classified      Heart disease 1999    tachycardia and high cholesterol, managed     History of blood transfusion 7/2017    while in hospital for ovarian cancer     Hypertension 1999    tho BP was too LOW last week. past 12 years Generally OK     Panic disorder without agoraphobia      Pure hypercholesterolemia     Lipitor     Tachycardia      Tachycardia, unspecified     9/1999-2/2004     Type II or unspecified type diabetes  mellitus without mention of complication, not stated as uncontrolled     diagnosed 2004     Past Surgical History:   Procedure Laterality Date     AS REMV KIDNEY,RADICAL Right      BIOPSY  7/2017 and 2/2018    ovarian and kidney cancer biopsies. also 1987 breast benign     BREAST SURGERY  1987    date unsure. benign breast cyst removed     CATARACT IOL, RT/LT Left 05/18/2018     CATARACT IOL, RT/LT Right 07/1318     COLONOSCOPY  2008 and 2013    polyps removed. Next due fall 2018     HYSTERECTOMY TOTAL ABDOMINAL, BILATERAL SALPINGO-OOPHORECTOMY, NODE DISSECTION, COMBINED Left 7/7/2017    Procedure: COMBINED HYSTERECTOMY TOTAL ABDOMINAL, SALPINGO-OOPHORECTOMY, NODE DISSECTION;  Exploratory Laparotomy, Left Salpingo-Oophorectomy, Cancer Staging, Total Hysterectomy, Omentectomy, Evacuation of abdominal fluid, Lymph Node Dissection  Anesthesia Block ;  Surgeon: Serena Cole MD;  Location: [facility] OR     HYSTERECTOMY, PAP NO LONGER INDICATED  07/07/2017    Laparotomy; for ovarian cancer staging     HYSTERECTOMY, PAP NO LONGER INDICATED       INSERT PORT VASCULAR ACCESS Right 8/21/2017    Procedure: INSERT PORT VASCULAR ACCESS;  Single Lumen Chest Power Port;  Surgeon: Stephen Mike PA-C;  Location: [facility] OR     LYMPHADENECTOMY RETROPERITONEAL Bilateral 07/07/2017    Laparotomy; pelvics & para-aortics; for ovarian cancer staging     OMENTECTOMY  07/07/2017    Laparotomy; for ovarian cancer staging     ORTHOPEDIC SURGERY  1/2002    broken left jaw due to fall, 4 fractures, titanium plates     PHACOEMULSIFICATION CLEAR CORNEA WITH STANDARD INTRAOCULAR LENS IMPLANT Right 7/13/2018    Procedure: PHACOEMULSIFICATION CLEAR CORNEA WITH STANDARD INTRAOCULAR LENS IMPLANT;  RIght Eye Phacoemulsification Clear Cornea with Standard Intraocular Lens Placement ;  Surgeon: Mary Jo Massey MD;  Location: [facility] OR     PHACOEMULSIFICATION CLEAR CORNEA WITH TORIC INTRAOCULAR LENS IMPLANT Left 5/18/2018    Procedure:  PHACOEMULSIFICATION CLEAR CORNEA WITH TORIC INTRAOCULAR LENS IMPLANT;  Left Eye Phacoemulsification with Toric Lens;  Surgeon: Mary Jo Massey MD;  Location: [facility] OR     SALPINGO OOPHORECTOMY,R/L/KAYY Right 2008    Salpingo Oophorectomy, RT     SALPINGO OOPHORECTOMY,R/L/KAYY Left 07/07/2017    Laparotomy; for ovarian cancer staging     SURGICAL HISTORY OF -       facial surgery d/t fall in 1/2002     SURGICAL HISTORY OF -       1985 removal of breast cyst     SURGICAL HISTORY OF -   2008    endometrial ablation       Allergies:  Allergies as of 09/21/2018 - Trevin as Reviewed 09/13/2018   Allergen Reaction Noted     Paclitaxel Anaphylaxis and Difficulty breathing 08/30/2017     Wasps [hornets] Anaphylaxis 09/03/2009     Yellow hornet venom [hornet venom] Anaphylaxis and Swelling 05/10/2018     Yellow jacket venom [venomil honey bee] Anaphylaxis and Swelling 05/10/2018     No clinical screening - see comments  11/01/2017     Wasp venom protein Swelling 01/18/2018     Sulfa drugs Hives and Rash 07/19/2005       Current Medications:  Current Outpatient Prescriptions   Medication Sig Dispense Refill     Acetaminophen (TYLENOL PO) Take 500 mg by mouth 2 tabs prn pain (monthly)       atorvastatin (LIPITOR) 80 MG tablet TAKE 1 TABLET(80 MG) BY MOUTH DAILY 90 tablet PRN     B Complex Vitamins (VITAMIN B-COMPLEX PO)        blood glucose (NO BRAND SPECIFIED) lancets standard Use to test blood sugar 2 times daily or as directed. 100 each 11     blood glucose monitoring (NO BRAND SPECIFIED) meter device kit Use to test blood sugar 2 times daily or as directed. 1 kit 0     blood glucose monitoring (NO BRAND SPECIFIED) test strip Use to test blood sugars 2 times daily or as directed 100 strip PRN     CALCIUM-MAGNESIUM-VITAMIN D PO Take 2 tablets by mouth daily       Cholecalciferol (VITAMIN D) 1000 UNIT capsule Take 2,000 Units by mouth daily        DiphenhydrAMINE HCl (BENADRYL PO) Take 50 mg by mouth daily as needed (monthly)        EPINEPHrine (EPIPEN/ADRENACLICK/OR ANY BX GENERIC EQUIV) 0.3 MG/0.3ML injection 2-pack Inject 0.3 mLs (0.3 mg) into the muscle once as needed for anaphylaxis 0.3 mL PRN     escitalopram (LEXAPRO) 20 MG tablet Take 1 tablet (20 mg) by mouth daily 30 tablet PRN     fluticasone (FLONASE) 50 MCG/ACT spray SHAKE WELL AND USE 2 SPRAYS IN EACH NOSTRIL DAILY 48 mL PRN     lisinopril (PRINIVIL/ZESTRIL) 10 MG tablet Take 1 tablet (10 mg) by mouth daily 90 tablet PRN     loratadine (CLARITIN) 10 MG tablet Take 1 tablet (10 mg) by mouth daily (Patient not taking: Reported on 2018) 30 tablet 1     LORazepam (ATIVAN) 1 MG tablet Take 1 tablet (1 mg) by mouth every 6 hours as needed (nausea/vomiting, anxiety or sleep) 30 tablet 1     Magnesium Hydroxide (MAGNESIA PO) Take 500 mg by mouth       metFORMIN (GLUCOPHAGE-XR) 500 MG 24 hr tablet TAKE 2 TABLETS BY MOUTH ONCE DAILY WITH EVENING MEAL 180 tablet PRN     Multiple Vitamins-Minerals (MULTIVITAMIN ADULT PO) Take 1 tablet by mouth daily       order for DME Home blood pressure monitor 1 Units 0     Probiotic Product (PRO-BIOTIC BLEND PO) Take 1 capsule by mouth daily Enzymatic Therapy-probiotic pearls       rivaroxaban ANTICOAGULANT (XARELTO) 20 MG TABS tablet Take 1 tablet (20 mg) by mouth daily (with dinner) 30 tablet 11     traZODone (DESYREL) 50 MG tablet TAKE 1 TO 2 TABLETS(50  MG) BY MOUTH EVERY NIGHT AS NEEDED FOR SLEEP 180 tablet 1        Family History:  Family History   Problem Relation Age of Onset     C.A.D. Father      Diabetes Father      Later in his life, in his 70s     Hypertension Father      Cerebrovascular Disease Father      1984 or      Psychotic Disorder Father      Pancreatic Cancer Father      Coronary Artery Disease Father      diagnosed in his late 50s (age)     Hyperlipidemia Father      treated w statins     Other Cancer Father      pancreatic, ,  of this     Depression Father      Mental Illness Father      likely PTSD and  depression     Obesity Father      C.A.D. Mother      Hypertension Mother      Breast Cancer Mother      2 times,  and ?     Psychotic Disorder Mother      Colon Cancer Mother      ?     Cancer Mother      Bone cancer     Coronary Artery Disease Mother      diagnosed in her late 70s (age)     Hyperlipidemia Mother      treated w. statins     Other Cancer Mother      bone/marrow, ,  of this     Depression Mother      Anxiety Disorder Mother      Mental Illness Mother      various undiagnosed types, but THERE     Obesity Mother      Prostate Problems Brother      cleared      Hypertension Brother      Hyperlipidemia Brother      unsure if treated w statins     Depression Brother      Anxiety Disorder Brother      Mental Illness Brother      undiagnosed but THERE     Obesity Brother      Psychotic Disorder Sister      x2     Neurologic Disorder Sister      Hypertension Sister      Hyperlipidemia Sister      treated w statins     Depression Sister      Anxiety Disorder Sister      Obesity Sister      Psychotic Disorder Brother      x2     Depression Brother      major depressive disorder and OCD     Anxiety Disorder Brother      Mental Illness Brother      not sure of diagnoses, treated     Hypertension Brother      Hyperlipidemia Brother      Psychotic Disorder Maternal Grandmother      ?     Coronary Artery Disease Maternal Grandmother       of heart attack age 84?     Depression Maternal Grandmother      Psychotic Disorder Maternal Grandfather      ?     Psychotic Disorder Paternal Grandmother      Schizophernia     Coronary Artery Disease Paternal Grandmother       of heart attack, age 85?     Depression Paternal Grandmother      severe, but recovered     Mental Illness Paternal Grandmother      possible bipolar or other     Psychotic Disorder Paternal Grandfather      ?     Respiratory Paternal Grandfather       of emphysema and smoking     Psychotic Disorder Sister      Other - See  Comments Sister      small kidney stone      Hypertension Sister      Hyperlipidemia Sister      treated w statins     Depression Sister      Anxiety Disorder Sister      Cancer - colorectal No family hx of      Glaucoma No family hx of      Macular Degeneration No family hx of        Social History:  Social History     Social History     Marital status: Single     Spouse name: N/A     Number of children: N/A     Years of education: N/A     Occupational History      Self Employed.     Social History Main Topics     Smoking status: Never Smoker     Smokeless tobacco: Never Used     Alcohol use Yes      Comment: Very infrequent, a bit of wine or beer 2x per month maybe     Drug use: Yes     Special: Marijuana      Comment: infrequent, for celebrations only, maybe 8x per year     Sexual activity: Yes     Partners: Male     Birth control/ protection: None      Comment: long-time      Other Topics Concern     Parent/Sibling W/ Cabg, Mi Or Angioplasty Before 65f 55m? No     Social History Narrative       Physical Exam:  LMP 01/01/2009    GENERAL APPEARANCE: healthy, alert and no distress     NECK: no adenopathy, no asymmetry or masses     LYMPHATICS: No cervical, supraclavicular or axillary lymphadenopathy     RESP: lungs clear to auscultation - no rales, rhonchi or wheezes     CARDIOVASCULAR: regular rate and rhythm, normal S1 S2, no S3 or S4 and no murmur.   BREAST: A multi positional, bilateral breast exam was performed.  Very symmetrical pendulous breasts, Lsl>R. Right breast: no palpable dominant masses, no nipple discharge, no skin changes. Fibrocystic changes throughout tissue.  Right axilla: no palpable adenopathy. Left breast: no palpable dominant masses, no nipple discharge, no skin changes. Left axilla: no palpable adenopathy. Fibrocystic changes throughout tissue.        SKIN: no suspicious lesions or rashes    Laboratory/Imaging Studies  Results for orders placed or performed in visit on 08/23/18       Result Value Ref Range     11 0 - 30 U/mL       ASSESSMENT  We discussed her interval history; she is doing well and will start back to full time work in October.  We reviewed her family history; there are no reported changes.  She has no breast complaints and her exam today was unremarkable.  She continues to follow up in the Gyn-Onc Survivorship Program and will proceed with the following screening plan.     INDIVIDUALIZED CANCER RISK MANAGEMENT PLAN:  Individualized Surveillance Plan for women  With 20% or greater lifetime risk of breast cancer   Per NCCN Breast Cancer Screening and Diagnosis Guidelines Version 2.2018    Recommended screening Test or procedure Last done Next Scheduled    Clinical encounter Ongoing risk assessment, risk reduction counseling and clinical breast exam, every 6-12 months. Refer to genetic counseling if not already done.   9/21/2018 Sept. 2019   However, some family histories with breast cancers at a very young age, may warrant screening starting earlier.    *May begin at age 40 if breast cancers in the family occur at later ages.    Annual mammogram beginning 10 years younger than the earliest breast cancer in the family but not prior to age 30.    Recommend annual breast MRI to begin 10 years younger than the earliest breast cancer in the family but not prior to age 25.    Breast MRIs are preferably done on day 7-15 of the menstrual cycle in premenopausal women.   4/5/2018: Breast MRI, BiRads2    9/15/2017 : Screening mammogram, BiRads1   Mammogram today after appt.    Next breast MRI: April 2019    Next exam: Sept. 2019 followed by tomosynthesis mammogram   Breast screening for patients at high risk due to thoracic radiation before 30 years old    Begin 10 years post radiation treatment or age 25.   Annual mammogram beginning 10 years after radiation but not prior to age 30    Annual breast MRI, preferably on day 7-15 of menstrual cycle for premenopausal women.        NA     NA   Women who have a lifetime risk of >20% based on history of LCIS or ADH/ALH Annual screening mammogram beginning at age of LCIS or ADH/ALH but not prior to age 30.    Consider annual MRI to begin at age of diagnosis of LCIS or ADH/ALH but not prior to age 25.    Consider risk reducing strategies.     NA     NA    Recommend risk reducing strategies for women with 1.7% 5 year risk of breast cancer. 3.6% 5 year risk      I spent 20 minutes with the patient with greater that 50% of it in counseling and coordinating care as documented above.    MARLON Chiu-CNS, OCN, AGN-BC  Clinical Nurse Specialist  Cancer Risk Management Program  [address]  [place] [address]    phone:  [phone]  Pager: [phone]  fax: [phone]    CC: LEIA ReillyNP

## 2018-09-21 NOTE — NURSING NOTE
"Oncology Rooming Note    September 21, 2018 12:28 PM   Hafsa Corona is a 63 year old female who presents for:    Chief Complaint   Patient presents with     Oncology Clinic Visit     Return visit related to Malignant neoplasm of ovary.     Initial Vitals: BP (!) 137/103 (BP Location: Left arm, Patient Position: Sitting, Cuff Size: Adult Regular)  Pulse 75  Temp 98.6  F (37  C) (Tympanic)  Resp 18  Ht 1.613 m (5' 3.5\")  Wt 93.2 kg (205 lb 8 oz)  LMP 01/01/2009  SpO2 97%  BMI 35.83 kg/m2 Estimated body mass index is 35.83 kg/(m^2) as calculated from the following:    Height as of this encounter: 1.613 m (5' 3.5\").    Weight as of this encounter: 93.2 kg (205 lb 8 oz). Body surface area is 2.04 meters squared.  No Pain (0) Comment: Data Unavailable   Patient's last menstrual period was 01/01/2009.  Allergies reviewed: Yes  Medications reviewed: Yes    Medications: Medication refills not needed today.  Pharmacy name entered into [employer]: [id number] - [place], [place] - [address] AT [facility] [address]    Clinical concerns: No new concerns. Provider was notified.    10 minutes for nursing intake (face to face time)     Virginia Hamilton LPN            "

## 2018-10-06 ENCOUNTER — OFFICE VISIT (OUTPATIENT)
Dept: URGENT CARE | Facility: URGENT CARE | Age: 63
End: 2018-10-06
Payer: COMMERCIAL

## 2018-10-06 VITALS
WEIGHT: 202 LBS | HEART RATE: 85 BPM | BODY MASS INDEX: 35.22 KG/M2 | OXYGEN SATURATION: 96 % | DIASTOLIC BLOOD PRESSURE: 85 MMHG | TEMPERATURE: 98 F | SYSTOLIC BLOOD PRESSURE: 126 MMHG

## 2018-10-06 DIAGNOSIS — N39.0 URINARY TRACT INFECTION WITHOUT HEMATURIA, SITE UNSPECIFIED: Primary | ICD-10-CM

## 2018-10-06 DIAGNOSIS — R30.0 DYSURIA: ICD-10-CM

## 2018-10-06 LAB
ALBUMIN UR-MCNC: 100 MG/DL
APPEARANCE UR: ABNORMAL
BACTERIA #/AREA URNS HPF: ABNORMAL /HPF
BILIRUB UR QL STRIP: NEGATIVE
COLOR UR AUTO: YELLOW
GLUCOSE UR STRIP-MCNC: NEGATIVE MG/DL
HGB UR QL STRIP: ABNORMAL
KETONES UR STRIP-MCNC: NEGATIVE MG/DL
LEUKOCYTE ESTERASE UR QL STRIP: ABNORMAL
NITRATE UR QL: NEGATIVE
NON-SQ EPI CELLS #/AREA URNS LPF: ABNORMAL /LPF
PH UR STRIP: 5.5 PH (ref 5–7)
RBC #/AREA URNS AUTO: >100 /HPF
SOURCE: ABNORMAL
SP GR UR STRIP: >1.03 (ref 1–1.03)
UROBILINOGEN UR STRIP-ACNC: 0.2 EU/DL (ref 0.2–1)
WBC #/AREA URNS AUTO: ABNORMAL /HPF

## 2018-10-06 PROCEDURE — 81001 URINALYSIS AUTO W/SCOPE: CPT | Performed by: FAMILY MEDICINE

## 2018-10-06 PROCEDURE — 87186 SC STD MICRODIL/AGAR DIL: CPT | Performed by: FAMILY MEDICINE

## 2018-10-06 PROCEDURE — 87088 URINE BACTERIA CULTURE: CPT | Performed by: FAMILY MEDICINE

## 2018-10-06 PROCEDURE — 99213 OFFICE O/P EST LOW 20 MIN: CPT | Performed by: FAMILY MEDICINE

## 2018-10-06 PROCEDURE — 87086 URINE CULTURE/COLONY COUNT: CPT | Performed by: FAMILY MEDICINE

## 2018-10-06 RX ORDER — CEPHALEXIN 500 MG/1
500 CAPSULE ORAL 3 TIMES DAILY
Qty: 30 CAPSULE | Refills: 0 | Status: SHIPPED | OUTPATIENT
Start: 2018-10-06 | End: 2019-01-29

## 2018-10-06 NOTE — PATIENT INSTRUCTIONS
"Take full course of Keflex if positive for true bacteria on urine culture.  If negative for bacteria growth, then okay to stop Keflex (3 days course)    Follow up with [facility] for urine recheck in 10-14 days.         * BLADDER INFECTION,Female (Adult)    A bladder infection (\"cystitis\" or \"UTI\") usually causes a constant urge to urinate and a burning when passing urine. Urine may be cloudy, smelly or dark. There may be pain in the lower abdomen. A bladder infection occurs when bacteria from the vaginal area enter the bladder opening (urethra). This can occur from sexual intercourse, wearing tight clothing, dehydration and other factors.  HOME CARE:  1. Drink lots of fluids (at least 6-8 glasses a day, unless you must restrict fluids for other medical reasons). This will force the medicine into your urinary system and flush the bacteria out of your body. Cranberry juice has been shown to help clear out the bacteria.  2. Avoid sexual intercourse until your symptoms are gone.  3. A bladder infection is treated with antibiotics. You may also be given Pyridium (generic = phenazopyridine) to reduce the burning sensation. This medicine will cause your urine to become a bright orange color. The orange urine may stain clothing. You may wear a pad or panty-liner to protect clothing.  PREVENTING FUTURE INFECTIONS:  1. Always wipe from front to back after a bowel movement.  2. Keep the genital area clean and dry.  3. Drink plenty of fluids each day to avoid dehydration.  4. Urinate right after intercourse to flush out the bladder.  5. Wear cotton underwear and cotton-lined panty hose; avoid tight-fitting pants.  6. If you are on birth control pills and are having frequent bladder infections, discuss with your doctor.  FOLLOW UP: Return to this facility or see your doctor if ALL symptoms are not gone after three days of treatment.  GET PROMPT MEDICAL ATTENTION if any of the following occur:    Fever over 101 F (38.3 C)    No " improvement by the third day of treatment    Increasing back or abdominal pain    Repeated vomiting; unable to keep medicine down    Weakness, dizziness or fainting    Vaginal discharge    Pain, redness or swelling in the labia (outer vaginal area)    1104-4172 [employer]. [address] [place] [address], [place] [address]. All rights reserved. This information is not intended as a substitute for professional medical care. Always follow your healthcare professional's instructions.  This information has been modified by your health care provider with permission from the publisher.

## 2018-10-06 NOTE — MR AVS SNAPSHOT
"              After Visit Summary   10/6/2018    Hafsa Corona    MRN: [id number]           Patient Information     Date Of Birth          1955        Visit Information        Provider Department      10/6/2018 4:05 PM Dane Alston MD [facility]        Today's Diagnoses     Urinary tract infection without hematuria, site unspecified    -  1    Dysuria          Care Instructions    Take full course of Keflex if positive for true bacteria on urine culture.  If negative for bacteria growth, then okay to stop Keflex (3 days course)    Follow up with [facility] for urine recheck in 10-14 days.         * BLADDER INFECTION,Female (Adult)    A bladder infection (\"cystitis\" or \"UTI\") usually causes a constant urge to urinate and a burning when passing urine. Urine may be cloudy, smelly or dark. There may be pain in the lower abdomen. A bladder infection occurs when bacteria from the vaginal area enter the bladder opening (urethra). This can occur from sexual intercourse, wearing tight clothing, dehydration and other factors.  HOME CARE:  1. Drink lots of fluids (at least 6-8 glasses a day, unless you must restrict fluids for other medical reasons). This will force the medicine into your urinary system and flush the bacteria out of your body. Cranberry juice has been shown to help clear out the bacteria.  2. Avoid sexual intercourse until your symptoms are gone.  3. A bladder infection is treated with antibiotics. You may also be given Pyridium (generic = phenazopyridine) to reduce the burning sensation. This medicine will cause your urine to become a bright orange color. The orange urine may stain clothing. You may wear a pad or panty-liner to protect clothing.  PREVENTING FUTURE INFECTIONS:  1. Always wipe from front to back after a bowel movement.  2. Keep the genital area clean and dry.  3. Drink plenty of fluids each day to avoid dehydration.  4. Urinate right after intercourse to flush out the " bladder.  5. Wear cotton underwear and cotton-lined panty hose; avoid tight-fitting pants.  6. If you are on birth control pills and are having frequent bladder infections, discuss with your doctor.  FOLLOW UP: Return to this facility or see your doctor if ALL symptoms are not gone after three days of treatment.  GET PROMPT MEDICAL ATTENTION if any of the following occur:    Fever over 101 F (38.3 C)    No improvement by the third day of treatment    Increasing back or abdominal pain    Repeated vomiting; unable to keep medicine down    Weakness, dizziness or fainting    Vaginal discharge    Pain, redness or swelling in the labia (outer vaginal area)    3894-3714 [employer]. [address] [place] [address]. All rights reserved. This information is not intended as a substitute for professional medical care. Always follow your healthcare professional's instructions.  This information has been modified by your health care provider with permission from the publisher.            Follow-ups after your visit        Your next 10 appointments already scheduled     Oct 22, 2018 10:00 AM CDT   RETURN GENERAL with Mary Jo Massey MD   [facility] ([facility])    [address]  [facility] [address]   [phone]            Dec 06, 2018  3:00 PM CST   [facility] Lab Draw with [facility] LAB DRAW   [facility] ([place] [facility])    [address] [place] [address]  [address]  [facility] [address]   [phone]            Dec 07, 2018  2:00 PM CST   (Arrive by 1:45 PM)   Return Visit with MARLON [facility] ([place] [facility])    [address] [place] [address]  [address]  [facility] [address]   [phone]            Apr 09, 2019  7:00 PM CDT   MR BREAST BILATERAL W/O & W CONTRAST with [address] [facility] [place] [facility] ([place] [place] [facility])    [facility]  [address] [id number] [phone]           How do I prepare for my exam? (Food and drink instructions) **If you will be receiving sedation or general anesthesia, please see special notes below.**  How do I prepare for my exam? (Other instructions) Take your medicines as usual, unless your doctor tells you not to. The timing of your exam may depend on the start of your last period. If you re in menopause, you may have the exam anytime.  You will have IV contrast for this exam.  You do not need to do anything special to prepare. **If you will be receiving sedation or general anesthesia, please see special notes below.**  What should I wear:  The MRI machine uses a strong magnet. Please wear clothes without metal (snaps, zippers). A sweatsuit works well, or we may give you a hospital gown. Please remove any body piercings and hair extensions before you arrive. You will also remove watches, jewelry, hairpins, wallets, dentures, partial dental plates and hearing aids. You may wear contact lenses, and you may be able to wear your rings. We have a safe place to keep your personal items, but it is safer to leave them at home.  How long does the exam take:  Most tests take 30 to 60 minutes.  HOWEVER, IF YOUR DOCTOR PRESCRIBES ANESTHESIA please plan on spending four to five hours in the recovery room.  What should I bring: Bring a list of your current medicines to your exam (including vitamins, minerals and over-the-counter drugs). Please bring any previous mammograms or breast MRIs from other facilities to the MRI dept. Do not mail these items to us.  Do I need a : **If you will be receiving sedation or general anesthesia, please see special notes below.**  What should I do after the exam: No Restrictions, You may resume normal activities.  What is this test: MRI (magnetic resonance imaging) uses a strong magnet and radio waves to look inside the body. An MRA (magnetic resonance  angiogram) does the same thing, but it lets us look at your blood vessels. A computer turns the radio waves into pictures showing cross sections of the body, much like slices of bread. This helps us see any problems more clearly. You may receive fluid (called  contrast ) before or during your scan. The fluid helps us see the pictures better. We give the fluid through an IV (small needle in your arm).  Who should I call with questions: If you have any questions, please contact your Imaging Department exam site. Directions, parking instructions, and other information is available on our website, [email or web address].  How do I prepare if I m having sedation or anesthesia? **IMPORTANT** THE INSTRUCTIONS BELOW ARE ONLY FOR THOSE PATIENTS WHO HAVE BEEN TOLD THEY WILL RECEIVE SEDATION OR GENERAL ANESTHESIA DURING THEIR MRI PROCEDURE:  IF YOU WILL RECEIVE SEDATION (take medicine to help you relax during your exam) You must get the medicine from your doctor before you arrive. Bring the medicine to the exam. Do not take it at home. Arrive one hour early. Bring someone who can take you home after the test. Your medicine will make you sleepy. After the exam, you may not drive, take a bus or take a taxi by yourself. No eating 8 hours before your exam. You may have clear liquids up until 4 hours before your exam. (Clear liquids include water, clear tea, black coffee and fruit juice without pulp.)  IF YOU WILL RECEIVE ANESTHESIA (be asleep for your exam) Arrive 1 1/2 hours early. Bring someone who can take you home after the test. You may not drive, take a bus or take a taxi by yourself. No eating 8 hours before your exam. You may have clear liquids up until 4 hours before your exam. (Clear liquids include water, clear tea, black coffee and fruit juice without pulp.)            Sep 12, 2019 11:00 AM CDT   RETURN CLOTTING DISORDER with William Villarreal MD   [facility] ([facility])    [address]  [address]  [facility] [address]   [phone]            Sep 27, 2019  9:30 AM CDT   (Arrive by 9:15 AM)   Return Visit with MARLON Chiu [facility] ([facility])    [address]  [address]  [facility] [id number] [phone]              Who to contact     If you have questions or need follow up information about today's clinic visit or your schedule please contact [facility] directly at [phone].  Normal or non-critical lab and imaging results will be communicated to you by [employer], letter or phone within 4 business days after the clinic has received the results. If you do not hear from us within 7 days, please contact the clinic through [employer] or phone. If you have a critical or abnormal lab result, we will notify you by phone as soon as possible.  Submit refill requests through [employer] or call your pharmacy and they will forward the refill request to us. Please allow 3 business days for your refill to be completed.          Additional Information About Your Visit        [employer] Information     [employer] gives you secure access to your electronic health record. If you see a primary care provider, you can also send messages to your care team and make appointments. If you have questions, please call your primary care clinic.  If you do not have a primary care provider, please call [phone] and they will assist you.        Care EveryWhere ID     This is your Care EveryWhere ID. This could be used by other organizations to access your [facility] medical records  [id number]        Your Vitals Were     Pulse Temperature Last Period Pulse Oximetry BMI (Body Mass Index)       85 98  F (36.7  C) (Oral) 01/01/2009 96% 35.22 kg/m2        Blood Pressure from Last 3 Encounters:   10/06/18 126/85   09/21/18 (!) 137/103   09/13/18 112/76    Weight from Last 3  Encounters:   10/06/18 202 lb (91.6 kg)   09/21/18 205 lb 8 oz (93.2 kg)   09/13/18 205 lb (93 kg)              We Performed the Following     *UA reflex to Microscopic and Culture ([facility] and [facility] (except [place] and [place])     Urine Culture Aerobic Bacterial     Urine Microscopic          Today's Medication Changes          These changes are accurate as of 10/6/18  5:59 PM.  If you have any questions, ask your nurse or doctor.               Start taking these medicines.        Dose/Directions    cephALEXin 500 MG capsule   Commonly known as:  KEFLEX   Used for:  Urinary tract infection without hematuria, site unspecified   Started by:  Dane Alston MD        Dose:  500 mg   Take 1 capsule (500 mg) by mouth 3 times daily for 10 days   Quantity:  30 capsule   Refills:  0            Where to get your medicines      These medications were sent to [facility] [id number] - [place], [place] - [address] AT [facility] [address]  [address], [place] [address]    Hours:  24-hours Phone:  [phone]     cephALEXin 500 MG capsule                Primary Care Provider Office Phone # Fax #    [facility] JAVIER Ayon [phone] [address] [facility] [address]        Equal Access to Services     RONEY ARANDA AH: Hadii aad ku hadasho Soomaali, waaxda luqadaha, qaybta kaalmada adeegyada, waxay idiin hayaan erika friedman. So [place] [phone].    ATENCIÓN: Si habla español, tiene a martínez disposición servicios gratuitos de asistencia lingüística. Llame al [phone].    We comply with applicable federal civil rights laws and [place] laws. We do not discriminate on the basis of race, color, national origin, age, disability, sex, sexual orientation, or gender identity.            Thank you!     Thank you for choosing [facility]  for your care. Our goal is always to provide you with excellent care. Hearing back from our patients is  one way we can continue to improve our services. Please take a few minutes to complete the written survey that you may receive in the mail after your visit with us. Thank you!             Your Updated Medication List - Protect others around you: Learn how to safely use, store and throw away your medicines at www.disposemymeds.org.          This list is accurate as of 10/6/18  5:59 PM.  Always use your most recent med list.                   Brand Name Dispense Instructions for use Diagnosis    atorvastatin 80 MG tablet    LIPITOR    90 tablet    TAKE 1 TABLET(80 MG) BY MOUTH DAILY    Hyperlipidemia LDL goal <100       BENADRYL PO      Take 50 mg by mouth daily as needed (monthly)        blood glucose lancets standard    no brand specified    100 each    Use to test blood sugar 2 times daily or as directed.    Type 2 diabetes mellitus without complication, without long-term current use of insulin (H)       blood glucose monitoring meter device kit    no brand specified    1 kit    Use to test blood sugar 2 times daily or as directed.    Type 2 diabetes mellitus without complication, without long-term current use of insulin (H)       blood glucose monitoring test strip    no brand specified    100 strip    Use to test blood sugars 2 times daily or as directed    Type 2 diabetes mellitus without complication, without long-term current use of insulin (H)       CALCIUM-MAGNESIUM-VITAMIN D PO      Take 2 tablets by mouth daily        cephALEXin 500 MG capsule    KEFLEX    30 capsule    Take 1 capsule (500 mg) by mouth 3 times daily for 10 days    Urinary tract infection without hematuria, site unspecified       EPINEPHrine 0.3 MG/0.3ML injection 2-pack    EPIPEN/ADRENACLICK/or ANY BX GENERIC EQUIV    0.3 mL    Inject 0.3 mLs (0.3 mg) into the muscle once as needed for anaphylaxis    Bee allergy status       escitalopram 20 MG tablet    LEXAPRO    30 tablet    Take 1 tablet (20 mg) by mouth daily    Major depressive  disorder, recurrent episode, mild (H)       fluticasone 50 MCG/ACT spray    FLONASE    48 mL    SHAKE WELL AND USE 2 SPRAYS IN EACH NOSTRIL DAILY    Chronic maxillary sinusitis       lisinopril 10 MG tablet    PRINIVIL/ZESTRIL    90 tablet    Take 1 tablet (10 mg) by mouth daily    Essential hypertension       loratadine 10 MG tablet    CLARITIN    30 tablet    Take 1 tablet (10 mg) by mouth daily    Pain in joint, multiple sites       LORazepam 1 MG tablet    ATIVAN    30 tablet    Take 1 tablet (1 mg) by mouth every 6 hours as needed (nausea/vomiting, anxiety or sleep)    Ovarian cancer, left (H), Encounter for long-term (current) use of medications       MAGNESIA PO      Take 500 mg by mouth        metFORMIN 500 MG 24 hr tablet    GLUCOPHAGE-XR    180 tablet    TAKE 2 TABLETS BY MOUTH ONCE DAILY WITH EVENING MEAL    Type 2 diabetes mellitus without complication, without long-term current use of insulin (H)       MULTIVITAMIN ADULT PO      Take 1 tablet by mouth daily        order for DME     1 Units    Home blood pressure monitor    Type 2 diabetes mellitus without complication, without long-term current use of insulin (H)       PRO-BIOTIC BLEND PO      Take 1 capsule by mouth daily Enzymatic Therapy-probiotic pearls        rivaroxaban ANTICOAGULANT 20 MG Tabs tablet    XARELTO    30 tablet    Take 1 tablet (20 mg) by mouth daily (with dinner)    Long-term (current) use of anticoagulants, Acute deep vein thrombosis (DVT) of left lower extremity, unspecified vein (H)       traZODone 50 MG tablet    DESYREL    180 tablet    TAKE 1 TO 2 TABLETS(50  MG) BY MOUTH EVERY NIGHT AS NEEDED FOR SLEEP    Insomnia, unspecified type       TYLENOL PO      Take 500 mg by mouth 2 tabs prn pain (monthly)        VITAMIN B-COMPLEX PO           vitamin D 1000 units capsule      Take 2,000 Units by mouth daily

## 2018-10-06 NOTE — PROGRESS NOTES
SUBJECTIVE:   Hafsa Corona is a 63 year old female who presents to clinic today for the following health issues:    URINARY TRACT SYMPTOMS      Duration: today    Description  dysuria, urgency and hematuria    Intensity:  moderate    Accompanying signs and symptoms:  Fever/chills: YES- chills  Flank pain no   Nausea and vomiting: no   Vaginal symptoms: none  Abdominal/Pelvic Pain: no     History  History of frequent UTI's: YES  History of kidney stones: no   Sexually Active: YES  Possibility of pregnancy: No    Precipitating or alleviating factors: None    Therapies tried and outcome: cranberry juice and increase fluid intake         Problem list and histories reviewed & adjusted, as indicated.  Additional history: as documented.  Had right kidney removed.  Reviewed prior UA, positive urine culture for E.coli, pan sensitive.    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)     Pain in joint, multiple sites     ACP (advance care planning)     Renal cell carcinoma, right (H)     Solitary kidney, acquired     Encounter for follow-up surveillance of ovarian cancer     Personal history of DVT (deep vein thrombosis)     Abscess of left lower extremity     ADD (attention deficit disorder) without hyperactivity     Angiomyolipoma of kidney     Anxiety     Coronary atherosclerosis     Depressive disorder     Dyslipidemia     Dysthymic  disorder     Generalized anxiety disorder     Heel pain     Lymphedema     Mild anemia     Normocytic anemia     Renal oncocytoma     Panic disorder with agoraphobia     Past Surgical History:   Procedure Laterality Date     AS REMV KIDNEY,RADICAL Right      BIOPSY  7/2017 and 2/2018    ovarian and kidney cancer biopsies. also 1987 breast benign     BREAST SURGERY  1987    date unsure. benign breast cyst removed     CATARACT IOL, RT/LT Left 05/18/2018     CATARACT IOL, RT/LT Right 07/1318     COLONOSCOPY  2008 and 2013    polyps removed. Next due fall 2018     HYSTERECTOMY TOTAL ABDOMINAL, BILATERAL SALPINGO-OOPHORECTOMY, NODE DISSECTION, COMBINED Left 7/7/2017    Procedure: COMBINED HYSTERECTOMY TOTAL ABDOMINAL, SALPINGO-OOPHORECTOMY, NODE DISSECTION;  Exploratory Laparotomy, Left Salpingo-Oophorectomy, Cancer Staging, Total Hysterectomy, Omentectomy, Evacuation of abdominal fluid, Lymph Node Dissection  Anesthesia Block ;  Surgeon: Serena Cole MD;  Location: [facility] OR     HYSTERECTOMY, PAP NO LONGER INDICATED  07/07/2017    Laparotomy; for ovarian cancer staging     HYSTERECTOMY, PAP NO LONGER INDICATED       INSERT PORT VASCULAR ACCESS Right 8/21/2017    Procedure: INSERT PORT VASCULAR ACCESS;  Single Lumen Chest Power Port;  Surgeon: Stephen Mike PA-C;  Location: [facility] OR     LYMPHADENECTOMY RETROPERITONEAL Bilateral 07/07/2017    Laparotomy; pelvics & para-aortics; for ovarian cancer staging     OMENTECTOMY  07/07/2017    Laparotomy; for ovarian cancer staging     ORTHOPEDIC SURGERY  1/2002    broken left jaw due to fall, 4 fractures, titanium plates     PHACOEMULSIFICATION CLEAR CORNEA WITH STANDARD INTRAOCULAR LENS IMPLANT Right 7/13/2018    Procedure: PHACOEMULSIFICATION CLEAR CORNEA WITH STANDARD INTRAOCULAR LENS IMPLANT;  RIght Eye Phacoemulsification Clear Cornea with Standard Intraocular Lens Placement ;  Surgeon: Mary Jo Massey MD;  Location: [facility] OR     PHACOEMULSIFICATION CLEAR CORNEA  WITH TORIC INTRAOCULAR LENS IMPLANT Left 2018    Procedure: PHACOEMULSIFICATION CLEAR CORNEA WITH TORIC INTRAOCULAR LENS IMPLANT;  Left Eye Phacoemulsification with Toric Lens;  Surgeon: Mary Jo Massey MD;  Location: [facility] OR     SALPINGO OOPHORECTOMY,R/L/KAYY Right     Salpingo Oophorectomy, RT     SALPINGO OOPHORECTOMY,R/L/KAYY Left 2017    Laparotomy; for ovarian cancer staging     SURGICAL HISTORY OF -       facial surgery d/t fall in 2002     SURGICAL HISTORY OF -        removal of breast cyst     SURGICAL HISTORY OF -       endometrial ablation       Social History   Substance Use Topics     Smoking status: Never Smoker     Smokeless tobacco: Never Used     Alcohol use Yes      Comment: Very infrequent, a bit of wine or beer 2x per month maybe     Family History   Problem Relation Age of Onset     C.A.D. Father      Diabetes Father      Later in his life, in his 70s     Hypertension Father      Cerebrovascular Disease Father      1984 or      Psychotic Disorder Father      Pancreatic Cancer Father      Coronary Artery Disease Father      diagnosed in his late 50s (age)     Hyperlipidemia Father      treated w statins     Other Cancer Father      pancreatic, ,  of this     Depression Father      Mental Illness Father      likely PTSD and depression     Obesity Father      C.A.D. Mother      Hypertension Mother      Breast Cancer Mother      2 times,  and ?     Psychotic Disorder Mother      Colon Cancer Mother      ?     Cancer Mother      Bone cancer     Coronary Artery Disease Mother      diagnosed in her late 70s (age)     Hyperlipidemia Mother      treated w. statins     Other Cancer Mother      bone/marrow, ,  of this     Depression Mother      Anxiety Disorder Mother      Mental Illness Mother      various undiagnosed types, but THERE     Obesity Mother      Prostate Problems Brother      cleared      Hypertension Brother      Hyperlipidemia  Brother      unsure if treated w statins     Depression Brother      Anxiety Disorder Brother      Mental Illness Brother      undiagnosed but THERE     Obesity Brother      Psychotic Disorder Sister      x2     Neurologic Disorder Sister      Hypertension Sister      Hyperlipidemia Sister      treated w statins     Depression Sister      Anxiety Disorder Sister      Obesity Sister      Psychotic Disorder Brother      x2     Depression Brother      major depressive disorder and OCD     Anxiety Disorder Brother      Mental Illness Brother      not sure of diagnoses, treated     Hypertension Brother      Hyperlipidemia Brother      Psychotic Disorder Maternal Grandmother      ?     Coronary Artery Disease Maternal Grandmother       of heart attack age 84?     Depression Maternal Grandmother      Psychotic Disorder Maternal Grandfather      ?     Psychotic Disorder Paternal Grandmother      Schizophernia     Coronary Artery Disease Paternal Grandmother       of heart attack, age 85?     Depression Paternal Grandmother      severe, but recovered     Mental Illness Paternal Grandmother      possible bipolar or other     Psychotic Disorder Paternal Grandfather      ?     Respiratory Paternal Grandfather       of emphysema and smoking     Psychotic Disorder Sister      Other - See Comments Sister      small kidney stone      Hypertension Sister      Hyperlipidemia Sister      treated w statins     Depression Sister      Anxiety Disorder Sister      Cancer - colorectal No family hx of      Glaucoma No family hx of      Macular Degeneration No family hx of          Current Outpatient Prescriptions   Medication Sig Dispense Refill     cephALEXin (KEFLEX) 500 MG capsule Take 1 capsule (500 mg) by mouth 3 times daily for 10 days 30 capsule 0     Acetaminophen (TYLENOL PO) Take 500 mg by mouth 2 tabs prn pain (monthly)       atorvastatin (LIPITOR) 80 MG tablet TAKE 1 TABLET(80 MG) BY MOUTH DAILY 90 tablet PRN     B  Complex Vitamins (VITAMIN B-COMPLEX PO)        blood glucose (NO BRAND SPECIFIED) lancets standard Use to test blood sugar 2 times daily or as directed. 100 each 11     blood glucose monitoring (NO BRAND SPECIFIED) meter device kit Use to test blood sugar 2 times daily or as directed. 1 kit 0     blood glucose monitoring (NO BRAND SPECIFIED) test strip Use to test blood sugars 2 times daily or as directed 100 strip PRN     CALCIUM-MAGNESIUM-VITAMIN D PO Take 2 tablets by mouth daily       Cholecalciferol (VITAMIN D) 1000 UNIT capsule Take 2,000 Units by mouth daily        DiphenhydrAMINE HCl (BENADRYL PO) Take 50 mg by mouth daily as needed (monthly)       EPINEPHrine (EPIPEN/ADRENACLICK/OR ANY BX GENERIC EQUIV) 0.3 MG/0.3ML injection 2-pack Inject 0.3 mLs (0.3 mg) into the muscle once as needed for anaphylaxis (Patient not taking: Reported on 9/21/2018) 0.3 mL PRN     escitalopram (LEXAPRO) 20 MG tablet Take 1 tablet (20 mg) by mouth daily 30 tablet PRN     fluticasone (FLONASE) 50 MCG/ACT spray SHAKE WELL AND USE 2 SPRAYS IN EACH NOSTRIL DAILY 48 mL PRN     lisinopril (PRINIVIL/ZESTRIL) 10 MG tablet Take 1 tablet (10 mg) by mouth daily 90 tablet PRN     loratadine (CLARITIN) 10 MG tablet Take 1 tablet (10 mg) by mouth daily 30 tablet 1     LORazepam (ATIVAN) 1 MG tablet Take 1 tablet (1 mg) by mouth every 6 hours as needed (nausea/vomiting, anxiety or sleep) 30 tablet 1     Magnesium Hydroxide (MAGNESIA PO) Take 500 mg by mouth       metFORMIN (GLUCOPHAGE-XR) 500 MG 24 hr tablet TAKE 2 TABLETS BY MOUTH ONCE DAILY WITH EVENING MEAL 180 tablet PRN     Multiple Vitamins-Minerals (MULTIVITAMIN ADULT PO) Take 1 tablet by mouth daily       order for DME Home blood pressure monitor 1 Units 0     Probiotic Product (PRO-BIOTIC BLEND PO) Take 1 capsule by mouth daily Enzymatic Therapy-probiotic pearls       rivaroxaban ANTICOAGULANT (XARELTO) 20 MG TABS tablet Take 1 tablet (20 mg) by mouth daily (with dinner) 30 tablet 11      traZODone (DESYREL) 50 MG tablet TAKE 1 TO 2 TABLETS(50  MG) BY MOUTH EVERY NIGHT AS NEEDED FOR SLEEP 180 tablet 1     Allergies   Allergen Reactions     Paclitaxel Anaphylaxis and Difficulty breathing     Wasps [Hornets] Anaphylaxis     Yellow Hornet Venom [Hornet Venom] Anaphylaxis and Swelling     Yellow Jacket Venom [Venomil Honey Bee] Anaphylaxis and Swelling     No Clinical Screening - See Comments      Taxol      Wasp Venom Protein Swelling     mental disorientation, hives, yellow jackets, yellow hornets (per pt been through desensitization treatment)     Sulfa Drugs Hives and Rash       Reviewed and updated as needed this visit by clinical staff  Tobacco  Allergies  Meds       Reviewed and updated as needed this visit by Provider         ROS:  CONSTITUTIONAL: NEGATIVE for fever, change in weight and POSITIVE for chills  ENT/MOUTH: NEGATIVE for ear, mouth and throat problems  RESP: NEGATIVE for significant cough or SOB  CV: NEGATIVE for chest pain, palpitations or peripheral edema  : POSITIVE for dysuria, frequency and hematuria    OBJECTIVE:                                                    /85 (BP Location: Right arm, Cuff Size: Adult Regular)  Pulse 85  Temp 98  F (36.7  C) (Oral)  Wt 202 lb (91.6 kg)  LMP 01/01/2009  SpO2 96%  BMI 35.22 kg/m2  Body mass index is 35.22 kg/(m^2).   GENERAL: healthy, alert, well nourished, well hydrated, no distress  PSYCH: Alert and oriented times 3; speech- coherent , normal rate and volume; able to articulate logical thoughts, able to abstract reason, no tangential thoughts, no hallucinations or delusions, affect- normal    Diagnostic test results:  Diagnostic Test Results:  Results for orders placed or performed in visit on 10/06/18 (from the past 24 hour(s))   *UA reflex to Microscopic and Culture ([facility] (except [place] and [place])   Result Value Ref Range    Color Urine Yellow     Appearance Urine Slightly Cloudy      Glucose Urine Negative NEG^Negative mg/dL    Bilirubin Urine Negative NEG^Negative    Ketones Urine Negative NEG^Negative mg/dL    Specific Gravity Urine >1.030 1.003 - 1.035    Blood Urine Large (A) NEG^Negative    pH Urine 5.5 5.0 - 7.0 pH    Protein Albumin Urine 100 (A) NEG^Negative mg/dL    Urobilinogen Urine 0.2 0.2 - 1.0 EU/dL    Nitrite Urine Negative NEG^Negative    Leukocyte Esterase Urine Small (A) NEG^Negative    Source Midstream Urine    Urine Microscopic   Result Value Ref Range    WBC Urine 5-10 (A) OTO5^0 - 5 /HPF    RBC Urine >100 (A) OTO2^O - 2 /HPF    Squamous Epithelial /LPF Urine Few FEW^Few /LPF    Bacteria Urine Moderate (A) NEG^Negative /HPF        ASSESSMENT/PLAN:                                                        ICD-10-CM    1. Urinary tract infection without hematuria, site unspecified N39.0 cephALEXin (KEFLEX) 500 MG capsule     Urine Culture Aerobic Bacterial   2. Dysuria R30.0 *UA reflex to Microscopic and Culture ([facility] (except [place] and [place])     Urine Microscopic     Urine Culture Aerobic Bacterial       Empiric treatment for presumptive UTI with Keflex.  Will follow up on urine culture and adjust medication if needed.  Okay to stop antibiotic if negative for true infection after 3 days.    Follow up with PCP for urine recheck in 10-14 days.    Dane Alston MD  [facility]

## 2018-10-09 LAB
BACTERIA SPEC CULT: ABNORMAL
SPECIMEN SOURCE: ABNORMAL

## 2018-10-09 RX ORDER — CIPROFLOXACIN 500 MG/1
500 TABLET, FILM COATED ORAL 2 TIMES DAILY
Qty: 14 TABLET | Refills: 0 | Status: SHIPPED | OUTPATIENT
Start: 2018-10-09 | End: 2019-01-29

## 2018-10-15 ENCOUNTER — MYC MEDICAL ADVICE (OUTPATIENT)
Dept: OPHTHALMOLOGY | Facility: CLINIC | Age: 63
End: 2018-10-15

## 2018-10-15 ENCOUNTER — MYC MEDICAL ADVICE (OUTPATIENT)
Dept: FAMILY MEDICINE | Facility: CLINIC | Age: 63
End: 2018-10-15

## 2018-10-15 PROCEDURE — 96523 IRRIG DRUG DELIVERY DEVICE: CPT

## 2018-10-15 PROCEDURE — 25000128 H RX IP 250 OP 636: Performed by: INTERNAL MEDICINE

## 2018-10-15 RX ORDER — HEPARIN SODIUM (PORCINE) LOCK FLUSH IV SOLN 100 UNIT/ML 100 UNIT/ML
5 SOLUTION INTRAVENOUS EVERY 8 HOURS
Status: DISCONTINUED | OUTPATIENT
Start: 2018-10-15 | End: 2018-10-23 | Stop reason: HOSPADM

## 2018-10-15 RX ADMIN — Medication 5 ML: at 11:50

## 2018-10-15 NOTE — NURSING NOTE
Chief Complaint   Patient presents with     Port Flush     accessed with Gripper needle, heparin locked,

## 2018-10-22 ENCOUNTER — OFFICE VISIT (OUTPATIENT)
Dept: OPHTHALMOLOGY | Facility: CLINIC | Age: 63
End: 2018-10-22
Attending: OPHTHALMOLOGY
Payer: COMMERCIAL

## 2018-10-22 DIAGNOSIS — H26.491 POSTERIOR CAPSULAR OPACIFICATION, RIGHT: Primary | ICD-10-CM

## 2018-10-22 DIAGNOSIS — Z96.1 PSEUDOPHAKIA, BOTH EYES: ICD-10-CM

## 2018-10-22 PROCEDURE — 66821 AFTER CATARACT LASER SURGERY: CPT | Mod: ZF | Performed by: OPHTHALMOLOGY

## 2018-10-22 PROCEDURE — G0463 HOSPITAL OUTPT CLINIC VISIT: HCPCS | Mod: ZF

## 2018-10-22 ASSESSMENT — VISUAL ACUITY
OD_SC: 20/30
OD_SC+: -1
METHOD: SNELLEN - LINEAR
OD_PH_SC: 20/25
OS_SC+: -2
OS_SC: 20/30
OS_PH_SC: 20/25

## 2018-10-22 ASSESSMENT — TONOMETRY
OS_IOP_MMHG: 10
IOP_METHOD: TONOPEN
OD_IOP_MMHG: 10

## 2018-10-22 ASSESSMENT — SLIT LAMP EXAM - LIDS
COMMENTS: NORMAL
COMMENTS: NORMAL

## 2018-10-22 ASSESSMENT — CUP TO DISC RATIO
OD_RATIO: 0.3
OS_RATIO: 0.3

## 2018-10-22 ASSESSMENT — EXTERNAL EXAM - RIGHT EYE: OD_EXAM: NORMAL

## 2018-10-22 ASSESSMENT — CONF VISUAL FIELD
OD_NORMAL: 1
OS_NORMAL: 1

## 2018-10-22 NOTE — MR AVS SNAPSHOT
After Visit Summary   10/22/2018    Hafsa Corona    MRN: [id number]           Patient Information     Date Of Birth          1955        Visit Information        Provider Department      10/22/2018 10:00 AM Mary Jo Massey MD [facility]        Today's Diagnoses     Posterior capsular opacification, right    -  1    Pseudophakia, both eyes           Follow-ups after your visit        Follow-up notes from your care team     Return in about 2 weeks (around 11/5/2018) for YAG cap left eye.      Your next 10 appointments already scheduled     Oct 23, 2018  3:45 PM CDT   (Arrive by 3:30 PM)   Team Short with Morelia Ayon NP   [facility]    [address] [place] [address]   [phone]            Nov 12, 2018 12:45 PM CST   Procedure with Mary Jo Massey MD   [facility] Clinic ([facility])    [facility]  [address]  [address] [address]  [facility] [address] [phone]            Nov 28, 2018 12:30 PM CST   RETURN GENERAL with Mary Jo Massey MD   [facility] ([facility])    [facility]  [address]  [address] [address]  [facility] [address] [phone]            Dec 06, 2018  3:00 PM CST   [facility] with [facility]   [facility] ([facility])    [address]  [address]  [facility] [id number] [phone]            Dec 07, 2018  2:00 PM CST   (Arrive by 1:45 PM)   Return Visit with MARLON Ogden [facility] ([facility])    [address]  [address]  [facility] [id number] [phone]            Apr 09, 2019  7:00 PM CDT   MR BREAST BILATERAL W/O & W CONTRAST with [address] [facility] ([facility])    [address]  1st Floor  [facility] [id number] [phone]           How do I  prepare for my exam? (Food and drink instructions) **If you will be receiving sedation or general anesthesia, please see special notes below.**  How do I prepare for my exam? (Other instructions) Take your medicines as usual, unless your doctor tells you not to. The timing of your exam may depend on the start of your last period. If you re in menopause, you may have the exam anytime.  You will have IV contrast for this exam.  You do not need to do anything special to prepare. **If you will be receiving sedation or general anesthesia, please see special notes below.**  What should I wear:  The MRI machine uses a strong magnet. Please wear clothes without metal (snaps, zippers). A sweatsuit works well, or we may give you a hospital gown. Please remove any body piercings and hair extensions before you arrive. You will also remove watches, jewelry, hairpins, wallets, dentures, partial dental plates and hearing aids. You may wear contact lenses, and you may be able to wear your rings. We have a safe place to keep your personal items, but it is safer to leave them at home.  How long does the exam take:  Most tests take 30 to 60 minutes.  HOWEVER, IF YOUR DOCTOR PRESCRIBES ANESTHESIA please plan on spending four to five hours in the recovery room.  What should I bring: Bring a list of your current medicines to your exam (including vitamins, minerals and over-the-counter drugs). Please bring any previous mammograms or breast MRIs from other facilities to the MRI dept. Do not mail these items to us.  Do I need a : **If you will be receiving sedation or general anesthesia, please see special notes below.**  What should I do after the exam: No Restrictions, You may resume normal activities.  What is this test: MRI (magnetic resonance imaging) uses a strong magnet and radio waves to look inside the body. An MRA (magnetic resonance angiogram) does the same thing, but it lets us look at your blood vessels. A computer turns  the radio waves into pictures showing cross sections of the body, much like slices of bread. This helps us see any problems more clearly. You may receive fluid (called  contrast ) before or during your scan. The fluid helps us see the pictures better. We give the fluid through an IV (small needle in your arm).  Who should I call with questions: If you have any questions, please contact your Imaging Department exam site. Directions, parking instructions, and other information is available on our website, [email or web address].  How do I prepare if I m having sedation or anesthesia? **IMPORTANT** THE INSTRUCTIONS BELOW ARE ONLY FOR THOSE PATIENTS WHO HAVE BEEN TOLD THEY WILL RECEIVE SEDATION OR GENERAL ANESTHESIA DURING THEIR MRI PROCEDURE:  IF YOU WILL RECEIVE SEDATION (take medicine to help you relax during your exam) You must get the medicine from your doctor before you arrive. Bring the medicine to the exam. Do not take it at home. Arrive one hour early. Bring someone who can take you home after the test. Your medicine will make you sleepy. After the exam, you may not drive, take a bus or take a taxi by yourself. No eating 8 hours before your exam. You may have clear liquids up until 4 hours before your exam. (Clear liquids include water, clear tea, black coffee and fruit juice without pulp.)  IF YOU WILL RECEIVE ANESTHESIA (be asleep for your exam) Arrive 1 1/2 hours early. Bring someone who can take you home after the test. You may not drive, take a bus or take a taxi by yourself. No eating 8 hours before your exam. You may have clear liquids up until 4 hours before your exam. (Clear liquids include water, clear tea, black coffee and fruit juice without pulp.)            Sep 12, 2019 11:00 AM CDT   RETURN CLOTTING DISORDER with William Villarreal MD   [facility] ([facility])    [address]  [address]  [facility] [address]    [phone]            Sep 27, 2019  9:30 AM CDT   (Arrive by 9:15 AM)   Return Visit with MARLON Ch[facility] ([facility])    [address]  [address]  [facility] [address] [phone]              Who to contact     Please call your clinic at [phone] to:    Ask questions about your health    Make or cancel appointments    Discuss your medicines    Learn about your test results    Speak to your doctor            Additional Information About Your Visit        [employer] Information     [employer] gives you secure access to your electronic health record. If you see a primary care provider, you can also send messages to your care team and make appointments. If you have questions, please call your primary care clinic.  If you do not have a primary care provider, please call [phone] and they will assist you.      [employer] is an electronic gateway that provides easy, online access to your medical records. With [employer], you can request a clinic appointment, read your test results, renew a prescription or communicate with your care team.     To access your existing account, please contact your [facility] or call [phone] for assistance.        Care EveryWhere ID     This is your Care EveryWhere ID. This could be used by other organizations to access your [facility] medical records  [id number]        Your Vitals Were     Last Period                   01/01/2009            Blood Pressure from Last 3 Encounters:   10/06/18 126/85   09/21/18 (!) 137/103   09/13/18 112/76    Weight from Last 3 Encounters:   10/06/18 91.6 kg (202 lb)   09/21/18 93.2 kg (205 lb 8 oz)   09/13/18 93 kg (205 lb)              We Performed the Following     YAG Capsulotomy OD (right eye)        Primary Care Provider Office Phone # Fax #    Morelia Ayon [phone]       [address] [facility]  [address]        Equal Access to Services     [facility] Center[facility] : Hadii aad ku hadterecortez Gregorioali, wagabrielleda luqadaha, qaybta delphinexochiltsujatha otoole. So [facility] [phone].    ATENCIÓN: Si habla español, tiene a martínez disposición servicios gratuitos de asistencia lingüística. Llame al [phone].    We comply with applicable federal civil rights laws and [place] laws. We do not discriminate on the basis of race, color, national origin, age, disability, sex, sexual orientation, or gender identity.            Thank you!     Thank you for choosing EYE [facility]INIC  for your care. Our goal is always to provide you with excellent care. Hearing back from our patients is one way we can continue to improve our services. Please take a few minutes to complete the written survey that you may receive in the mail after your visit with us. Thank you!             Your Updated Medication List - Protect others around you: Learn how to safely use, store and throw away your medicines at www.disposemymeds.org.          This list is accurate as of 10/22/18 11:32 AM.  Always use your most recent med list.                   Brand Name Dispense Instructions for use Diagnosis    atorvastatin 80 MG tablet    LIPITOR    90 tablet    TAKE 1 TABLET(80 MG) BY MOUTH DAILY    Hyperlipidemia LDL goal <100       BENADRYL PO      Take 50 mg by mouth daily as needed (monthly)        blood glucose lancets standard    no brand specified    100 each    Use to test blood sugar 2 times daily or as directed.    Type 2 diabetes mellitus without complication, without long-term current use of insulin (H)       blood glucose monitoring meter device kit    no brand specified    1 kit    Use to test blood sugar 2 times daily or as directed.    Type 2 diabetes mellitus without complication, without long-term current use of insulin (H)       blood glucose monitoring test strip    no brand specified    100 strip    Use to test blood sugars 2 times  daily or as directed    Type 2 diabetes mellitus without complication, without long-term current use of insulin (H)       CALCIUM-MAGNESIUM-VITAMIN D PO      Take 2 tablets by mouth daily        EPINEPHrine 0.3 MG/0.3ML injection 2-pack    EPIPEN/ADRENACLICK/or ANY BX GENERIC EQUIV    0.3 mL    Inject 0.3 mLs (0.3 mg) into the muscle once as needed for anaphylaxis    Bee allergy status       escitalopram 20 MG tablet    LEXAPRO    30 tablet    Take 1 tablet (20 mg) by mouth daily    Major depressive disorder, recurrent episode, mild (H)       fluticasone 50 MCG/ACT spray    FLONASE    48 mL    SHAKE WELL AND USE 2 SPRAYS IN EACH NOSTRIL DAILY    Chronic maxillary sinusitis       lisinopril 10 MG tablet    PRINIVIL/ZESTRIL    90 tablet    Take 1 tablet (10 mg) by mouth daily    Essential hypertension       loratadine 10 MG tablet    CLARITIN    30 tablet    Take 1 tablet (10 mg) by mouth daily    Pain in joint, multiple sites       LORazepam 1 MG tablet    ATIVAN    30 tablet    Take 1 tablet (1 mg) by mouth every 6 hours as needed (nausea/vomiting, anxiety or sleep)    Ovarian cancer, left (H), Encounter for long-term (current) use of medications       MAGNESIA PO      Take 500 mg by mouth        metFORMIN 500 MG 24 hr tablet    GLUCOPHAGE-XR    180 tablet    TAKE 2 TABLETS BY MOUTH ONCE DAILY WITH EVENING MEAL    Type 2 diabetes mellitus without complication, without long-term current use of insulin (H)       MULTIVITAMIN ADULT PO      Take 1 tablet by mouth daily        order for DME     1 Units    Home blood pressure monitor    Type 2 diabetes mellitus without complication, without long-term current use of insulin (H)       PRO-BIOTIC BLEND PO      Take 1 capsule by mouth daily Enzymatic Therapy-probiotic pearls        rivaroxaban ANTICOAGULANT 20 MG Tabs tablet    XARELTO    30 tablet    Take 1 tablet (20 mg) by mouth daily (with dinner)    Long-term (current) use of anticoagulants, Acute deep vein thrombosis  (DVT) of left lower extremity, unspecified vein (H)       traZODone 50 MG tablet    DESYREL    180 tablet    TAKE 1 TO 2 TABLETS(50  MG) BY MOUTH EVERY NIGHT AS NEEDED FOR SLEEP    Insomnia, unspecified type       TYLENOL PO      Take 500 mg by mouth 2 tabs prn pain (monthly)        VITAMIN B-COMPLEX PO           vitamin D 1000 units capsule      Take 2,000 Units by mouth daily

## 2018-10-22 NOTE — NURSING NOTE
Chief Complaints and History of Present Illnesses   Patient presents with     Follow Up For      Posterior capsular opacification, right     HPI    Affected eye(s):  Both   Symptoms:        Duration:  2 months   Frequency:  Constant       Do you have eye pain now?:  No      Comments:  Pt. States that VA RE seems to have improved since last visit.   Seems as thought VA LE is worse than RE now.   No c/o comfort BE.  Pallavi JANE 10:49 AM October 22, 2018

## 2018-10-22 NOTE — PROGRESS NOTES
Hafsa is here for YAG capsulotomy right eye for visually significant PCO on the right. She is also now noticing more blurring left eye.    Please see Imaging/Proc tab for procedure details.    Will schedule for left eye YAG in 2-3 weeks.    Mary Jo Massey MD  Comprehensive Ophthalmology & Ocular Pathology  Department of Ophthalmology and Visual Neurosciences  [email or web address]  Pager [phone]

## 2018-10-23 ENCOUNTER — OFFICE VISIT (OUTPATIENT)
Dept: FAMILY MEDICINE | Facility: CLINIC | Age: 63
End: 2018-10-23
Payer: COMMERCIAL

## 2018-10-23 ENCOUNTER — APPOINTMENT (OUTPATIENT)
Dept: LAB | Facility: CLINIC | Age: 63
End: 2018-10-23
Payer: COMMERCIAL

## 2018-10-23 VITALS
DIASTOLIC BLOOD PRESSURE: 71 MMHG | BODY MASS INDEX: 35.65 KG/M2 | RESPIRATION RATE: 18 BRPM | HEART RATE: 87 BPM | TEMPERATURE: 98.2 F | OXYGEN SATURATION: 95 % | WEIGHT: 204.5 LBS | SYSTOLIC BLOOD PRESSURE: 102 MMHG

## 2018-10-23 DIAGNOSIS — E66.01 MORBID OBESITY (H): ICD-10-CM

## 2018-10-23 DIAGNOSIS — F33.0 MAJOR DEPRESSIVE DISORDER, RECURRENT EPISODE, MILD (H): ICD-10-CM

## 2018-10-23 DIAGNOSIS — Z87.440 HISTORY OF RECURRENT URINARY TRACT INFECTION: Primary | ICD-10-CM

## 2018-10-23 DIAGNOSIS — C64.1 RENAL CELL CARCINOMA, RIGHT (H): ICD-10-CM

## 2018-10-23 DIAGNOSIS — C56.9 MALIGNANT NEOPLASM OF OVARY, UNSPECIFIED LATERALITY (H): ICD-10-CM

## 2018-10-23 DIAGNOSIS — Z23 NEED FOR PROPHYLACTIC VACCINATION AND INOCULATION AGAINST INFLUENZA: ICD-10-CM

## 2018-10-23 LAB
ALBUMIN UR-MCNC: NEGATIVE MG/DL
APPEARANCE UR: CLEAR
BACTERIA #/AREA URNS HPF: ABNORMAL /HPF
BILIRUB UR QL STRIP: NEGATIVE
COLOR UR AUTO: YELLOW
GLUCOSE UR STRIP-MCNC: NEGATIVE MG/DL
HGB UR QL STRIP: ABNORMAL
KETONES UR STRIP-MCNC: ABNORMAL MG/DL
LEUKOCYTE ESTERASE UR QL STRIP: NEGATIVE
NITRATE UR QL: NEGATIVE
NON-SQ EPI CELLS #/AREA URNS LPF: ABNORMAL /LPF
PH UR STRIP: 5 PH (ref 5–7)
RBC #/AREA URNS AUTO: ABNORMAL /HPF
SOURCE: ABNORMAL
SP GR UR STRIP: 1.02 (ref 1–1.03)
UROBILINOGEN UR STRIP-ACNC: 0.2 EU/DL (ref 0.2–1)
WBC #/AREA URNS AUTO: ABNORMAL /HPF

## 2018-10-23 PROCEDURE — 90471 IMMUNIZATION ADMIN: CPT | Performed by: NURSE PRACTITIONER

## 2018-10-23 PROCEDURE — 81001 URINALYSIS AUTO W/SCOPE: CPT | Performed by: NURSE PRACTITIONER

## 2018-10-23 PROCEDURE — 99214 OFFICE O/P EST MOD 30 MIN: CPT | Mod: 25 | Performed by: NURSE PRACTITIONER

## 2018-10-23 PROCEDURE — 90682 RIV4 VACC RECOMBINANT DNA IM: CPT | Performed by: NURSE PRACTITIONER

## 2018-10-23 PROCEDURE — 87086 URINE CULTURE/COLONY COUNT: CPT | Performed by: NURSE PRACTITIONER

## 2018-10-23 RX ORDER — NITROFURANTOIN 25; 75 MG/1; MG/1
CAPSULE ORAL
Qty: 30 CAPSULE | Status: SHIPPED | OUTPATIENT
Start: 2018-10-23 | End: 2019-01-29

## 2018-10-23 NOTE — MR AVS SNAPSHOT
After Visit Summary   10/23/2018    Hafsa Corona    MRN: [id number]           Patient Information     Date Of Birth          1955        Visit Information        Provider Department      10/23/2018 3:45 PM Morelia Ayon NP [facility]        Today's Diagnoses     History of recurrent urinary tract infection    -  1    Malignant neoplasm of ovary, unspecified laterality (H)        Renal cell carcinoma, right (H)        BMI > 35        Major depressive disorder, recurrent episode, mild (H)        Need for prophylactic vaccination and inoculation against influenza           Follow-ups after your visit        Follow-up notes from your care team     Return in about 1 week (around 10/30/2018), or if symptoms worsen or fail to improve.      Your next 10 appointments already scheduled     Nov 12, 2018 12:45 PM CST   Procedure with Mary Jo Massey MD   [facility] Clinic ([facility])    [address] [address]   [phone]            Nov 28, 2018 12:30 PM CST   RETURN GENERAL with Mary Jo Massey MD   [facility] Clinic ([facility])    [address] [address]   [phone]            Dec 06, 2018  3:00 PM CST   [facility] Draw with  [facility] DRAW   [facility] ([facility])    [address] [place] [address]  [address] [place] [address]   [phone]            Dec 07, 2018  2:00 PM CST   (Arrive by 1:45 PM)   Return Visit with MARLON [facility] ([facility])    [address] [place] [address]  [address]  [facility] [address]   [phone]            Apr 09, 2019  7:00 PM CDT   MR BREAST BILATERAL W/O & W CONTRAST with [address] [facility] ([facility])    [address] [place] [address]  [id number] [phone]           How do I prepare for my exam? (Food and drink instructions) **If you will be receiving sedation or general anesthesia, please see special notes below.**  How do I prepare for my exam? (Other instructions) Take your medicines as usual, unless your doctor tells you not to. The timing of your exam may depend on the start of your last period. If you re in menopause, you may have the exam anytime.  You will have IV contrast for this exam.  You do not need to do anything special to prepare. **If you will be receiving sedation or general anesthesia, please see special notes below.**  What should I wear:  The MRI machine uses a strong magnet. Please wear clothes without metal (snaps, zippers). A sweatsuit works well, or we may give you a hospital gown. Please remove any body piercings and hair extensions before you arrive. You will also remove watches, jewelry, hairpins, wallets, dentures, partial dental plates and hearing aids. You may wear contact lenses, and you may be able to wear your rings. We have a safe place to keep your personal items, but it is safer to leave them at home.  How long does the exam take:  Most tests take 30 to 60 minutes.  HOWEVER, IF YOUR DOCTOR PRESCRIBES ANESTHESIA please plan on spending four to five hours in the recovery room.  What should I bring: Bring a list of your current medicines to your exam (including vitamins, minerals and over-the-counter drugs). Please bring any previous mammograms or breast MRIs from other facilities to the MRI dept. Do not mail these items to us.  Do I need a : **If you will be receiving sedation or general anesthesia, please see special notes below.**  What should I do after the exam: No Restrictions, You may resume normal activities.  What is this test: MRI (magnetic resonance imaging) uses a strong magnet and radio waves to look inside the body. An MRA (magnetic resonance angiogram) does the same thing, but it lets us  look at your blood vessels. A computer turns the radio waves into pictures showing cross sections of the body, much like slices of bread. This helps us see any problems more clearly. You may receive fluid (called  contrast ) before or during your scan. The fluid helps us see the pictures better. We give the fluid through an IV (small needle in your arm).  Who should I call with questions: If you have any questions, please contact your Imaging Department exam site. Directions, parking instructions, and other information is available on our website, [email or web address].  How do I prepare if I m having sedation or anesthesia? **IMPORTANT** THE INSTRUCTIONS BELOW ARE ONLY FOR THOSE PATIENTS WHO HAVE BEEN TOLD THEY WILL RECEIVE SEDATION OR GENERAL ANESTHESIA DURING THEIR MRI PROCEDURE:  IF YOU WILL RECEIVE SEDATION (take medicine to help you relax during your exam) You must get the medicine from your doctor before you arrive. Bring the medicine to the exam. Do not take it at home. Arrive one hour early. Bring someone who can take you home after the test. Your medicine will make you sleepy. After the exam, you may not drive, take a bus or take a taxi by yourself. No eating 8 hours before your exam. You may have clear liquids up until 4 hours before your exam. (Clear liquids include water, clear tea, black coffee and fruit juice without pulp.)  IF YOU WILL RECEIVE ANESTHESIA (be asleep for your exam) Arrive 1 1/2 hours early. Bring someone who can take you home after the test. You may not drive, take a bus or take a taxi by yourself. No eating 8 hours before your exam. You may have clear liquids up until 4 hours before your exam. (Clear liquids include water, clear tea, black coffee and fruit juice without pulp.)            Sep 12, 2019 11:00 AM CDT   RETURN CLOTTING DISORDER with William Villarreal MD   [facility] ([facility])    [address]  [address]  [facility] [address] [phone]            Sep 27, 2019  9:30 AM CDT   (Arrive by 9:15 AM)   Return Visit with MARLON Chiu [facility] ([facility])    [address]  [address]  [facility] [id number] [phone]              Who to contact     If you have questions or need follow up information about today's clinic visit or your schedule please contact [facility] directly at [phone].  Normal or non-critical lab and imaging results will be communicated to you by Off[employer]art, letter or phone within 4 business days after the clinic has received the results. If you do not hear from us within 7 days, please contact the clinic through [employer] or phone. If you have a critical or abnormal lab result, we will notify you by phone as soon as possible.  Submit refill requests through [employer] or call your pharmacy and they will forward the refill request to us. Please allow 3 business days for your refill to be completed.          Additional Information About Your Visit        MyChart Information     [employer] gives you secure access to your electronic health record. If you see a primary care provider, you can also send messages to your care team and make appointments. If you have questions, please call your primary care clinic.  If you do not have a primary care provider, please call [phone] and they will assist you.        Care EveryWhere ID     This is your Care EveryWhere ID. This could be used by other organizations to access your [facility] medical records  [id number]        Your Vitals Were     Pulse Temperature Respirations Last Period Pulse Oximetry BMI (Body Mass Index)    87 98.2  F (36.8  C) (Oral) 18 01/01/2009 95% 35.65 kg/m2       Blood Pressure from Last 3 Encounters:   10/23/18 102/71   10/06/18 126/85   09/21/18 (!) 137/103    Weight from Last 3 Encounters:   10/23/18 204 lb 8 oz  (92.8 kg)   10/06/18 202 lb (91.6 kg)   09/21/18 205 lb 8 oz (93.2 kg)              We Performed the Following     *UA reflex to Microscopic     FLU VACCINE, (RIV4) RECOMBINANT HA  , IM (FluBlok, egg free) [37076]- >18 YRS (FMG recommended  50-64 YRS)     Urine Culture Aerobic Bacterial     Urine Microscopic     Vaccine Administration, Initial [90725]          Today's Medication Changes          These changes are accurate as of 10/23/18  5:49 PM.  If you have any questions, ask your nurse or doctor.               Start taking these medicines.        Dose/Directions    nitroFURantoin (macrocrystal-monohydrate) 100 MG capsule   Commonly known as:  MACROBID   Used for:  History of recurrent urinary tract infection   Started by:  Morelia Ayon NP        Take one capsule after intercourse as needed   Quantity:  30 capsule   Refills:  PRN            Where to get your medicines      These medications were sent to [id number] - [place], [place] - [address] AT [facility] [address]  [address], [place] [address]    Hours:  24-hours Phone:  [phone]     nitroFURantoin (macrocrystal-monohydrate) 100 MG capsule                Primary Care Provider Office Phone # Fax #    Morelia Ayon [phone] [address] [facility] [address]  [facility] [address]        Equal Access to Services     [facility] HospitalJULIO AH: Hadii aad ku hadasho Soomaali, waaxda luqadaha, qaybta kaalmada adeegyada, waxay idiin hayaan erika kharash la'cathleenn ah. So [facility] [phone].    ATENCIÓN: Si habla español, tiene a martínez disposición servicios gratuitos de asistencia lingüística. Elizabeth al [phone].    We comply with applicable federal civil rights laws and [place] laws. We do not discriminate on the basis of race, color, national origin, age, disability, sex, sexual orientation, or gender identity.            Thank you!     Thank you for choosing [facility]  for your care. Our goal is  always to provide you with excellent care. Hearing back from our patients is one way we can continue to improve our services. Please take a few minutes to complete the written survey that you may receive in the mail after your visit with us. Thank you!             Your Updated Medication List - Protect others around you: Learn how to safely use, store and throw away your medicines at www.disposemymeds.org.          This list is accurate as of 10/23/18  5:49 PM.  Always use your most recent med list.                   Brand Name Dispense Instructions for use Diagnosis    atorvastatin 80 MG tablet    LIPITOR    90 tablet    TAKE 1 TABLET(80 MG) BY MOUTH DAILY    Hyperlipidemia LDL goal <100       BENADRYL PO      Take 50 mg by mouth daily as needed (monthly)        blood glucose lancets standard    no brand specified    100 each    Use to test blood sugar 2 times daily or as directed.    Type 2 diabetes mellitus without complication, without long-term current use of insulin (H)       blood glucose monitoring meter device kit    no brand specified    1 kit    Use to test blood sugar 2 times daily or as directed.    Type 2 diabetes mellitus without complication, without long-term current use of insulin (H)       blood glucose monitoring test strip    no brand specified    100 strip    Use to test blood sugars 2 times daily or as directed    Type 2 diabetes mellitus without complication, without long-term current use of insulin (H)       CALCIUM-MAGNESIUM-VITAMIN D PO      Take 2 tablets by mouth daily        EPINEPHrine 0.3 MG/0.3ML injection 2-pack    EPIPEN/ADRENACLICK/or ANY BX GENERIC EQUIV    0.3 mL    Inject 0.3 mLs (0.3 mg) into the muscle once as needed for anaphylaxis    Bee allergy status       escitalopram 20 MG tablet    LEXAPRO    30 tablet    Take 1 tablet (20 mg) by mouth daily    Major depressive disorder, recurrent episode, mild (H)       fluticasone 50 MCG/ACT spray    FLONASE    48 mL    SHAKE WELL  AND USE 2 SPRAYS IN EACH NOSTRIL DAILY    Chronic maxillary sinusitis       lisinopril 10 MG tablet    PRINIVIL/ZESTRIL    90 tablet    Take 1 tablet (10 mg) by mouth daily    Essential hypertension       LORazepam 1 MG tablet    ATIVAN    30 tablet    Take 1 tablet (1 mg) by mouth every 6 hours as needed (nausea/vomiting, anxiety or sleep)    Ovarian cancer, left (H), Encounter for long-term (current) use of medications       MAGNESIA PO      Take 500 mg by mouth        metFORMIN 500 MG 24 hr tablet    GLUCOPHAGE-XR    180 tablet    TAKE 2 TABLETS BY MOUTH ONCE DAILY WITH EVENING MEAL    Type 2 diabetes mellitus without complication, without long-term current use of insulin (H)       MULTIVITAMIN ADULT PO      Take 1 tablet by mouth daily        nitroFURantoin (macrocrystal-monohydrate) 100 MG capsule    MACROBID    30 capsule    Take one capsule after intercourse as needed    History of recurrent urinary tract infection       order for DME     1 Units    Home blood pressure monitor    Type 2 diabetes mellitus without complication, without long-term current use of insulin (H)       PRO-BIOTIC BLEND PO      Take 1 capsule by mouth daily Enzymatic Therapy-probiotic pearls        rivaroxaban ANTICOAGULANT 20 MG Tabs tablet    XARELTO    30 tablet    Take 1 tablet (20 mg) by mouth daily (with dinner)    Long-term (current) use of anticoagulants, Acute deep vein thrombosis (DVT) of left lower extremity, unspecified vein (H)       traZODone 50 MG tablet    DESYREL    180 tablet    TAKE 1 TO 2 TABLETS(50  MG) BY MOUTH EVERY NIGHT AS NEEDED FOR SLEEP    Insomnia, unspecified type       TYLENOL PO      Take 500 mg by mouth 2 tabs prn pain (monthly)        VITAMIN B-COMPLEX PO      Take 50 mg by mouth        vitamin D 1000 units capsule      Take 2,000 Units by mouth daily

## 2018-10-23 NOTE — PROGRESS NOTES
SUBJECTIVE:   Hafsa Corona is a 63 year old female who presents to clinic today for the following health issues:      ED/UC Followup:    Facility:  [facility]  Date of visit: 10/6/2018  Reason for visit: UTI  Current Status: especially concerned about diagnosis. Get to the bottom of it          She was recently treated for a UTI with Cipro and symptoms have resolved.  She has had three UTIs over the past year or so, most associated with sexual activity.    She does have a history of ovarian cancer and renal cancer and has regular UAs to monitor microhematuria.          Problem list and histories reviewed & adjusted, as indicated.  Additional history: as documented        Reviewed and updated as needed this visit by clinical staff       Reviewed and updated as needed this visit by Provider         ROS:  CONSTITUTIONAL: NEGATIVE for fever, chills, change in weight  ENT/MOUTH: NEGATIVE for ear, mouth and throat problems  RESP: NEGATIVE for significant cough or SOB  CV: NEGATIVE for chest pain, palpitations or peripheral edema  GI: NEGATIVE for nausea, abdominal pain, heartburn, or change in bowel habits  : negative for dysuria  PSYCHIATRIC: NEGATIVE for changes in mood or affect    OBJECTIVE:     /71  Pulse 87  Temp 98.2  F (36.8  C) (Oral)  Resp 18  Wt 204 lb 8 oz (92.8 kg)  LMP 01/01/2009  SpO2 95%  BMI 35.65 kg/m2  Body mass index is 35.65 kg/(m^2).  GENERAL: healthy, alert and no distress  RESP: lungs clear to auscultation - no rales, rhonchi or wheezes  CV: regular rate and rhythm, normal S1 S2, no S3 or S4, no murmur, click or rub, no peripheral edema and peripheral pulses strong  ABDOMEN: soft, nontender, no hepatosplenomegaly, no masses and bowel sounds normal  PSYCH: mentation appears normal, affect normal/bright    Diagnostic Test Results:  Results for orders placed or performed in visit on 10/23/18 (from the past 24 hour(s))   *UA reflex to Microscopic   Result Value Ref Range    Color Urine  Yellow     Appearance Urine Clear     Glucose Urine Negative NEG^Negative mg/dL    Bilirubin Urine Negative NEG^Negative    Ketones Urine Trace (A) NEG^Negative mg/dL    Specific Gravity Urine 1.025 1.003 - 1.035    Blood Urine Trace (A) NEG^Negative    pH Urine 5.0 5.0 - 7.0 pH    Protein Albumin Urine Negative NEG^Negative mg/dL    Urobilinogen Urine 0.2 0.2 - 1.0 EU/dL    Nitrite Urine Negative NEG^Negative    Leukocyte Esterase Urine Negative NEG^Negative    Source Midstream Urine    Urine Microscopic   Result Value Ref Range    WBC Urine 0 - 5 OTO5^0 - 5 /HPF    RBC Urine 2-5 (A) OTO2^O - 2 /HPF    Squamous Epithelial /LPF Urine Few FEW^Few /LPF    Bacteria Urine Few (A) NEG^Negative /HPF       ASSESSMENT/PLAN:             1. History of recurrent urinary tract infection  Will treat prophylactically with one dose of Macrobid after intercourse.  Long discussion regarding prevention of UTIs.   - nitroFURantoin, macrocrystal-monohydrate, (MACROBID) 100 MG capsule; Take one capsule after intercourse as needed  Dispense: 30 capsule; Refill: PRN  - Urine Culture Aerobic Bacterial    2. Malignant neoplasm of ovary, unspecified laterality (H)  She will continue to follow up with oncology.     3. Renal cell carcinoma, right (H)  See above.     4. Need for prophylactic vaccination and inoculation against influenza    - FLU VACCINE, (RIV4) RECOMBINANT HA  , IM (FluBlok, egg free) [64314]- >18 YRS (FMG recommended  50-64 YRS)  - Vaccine Administration, Initial [82989]  - Urine Microscopic    ((E66.01) BMI > 35  Comment:   Plan: Discussed diet and exercise.    (F33.0) Major depressive disorder, recurrent episode, mild (H)  Comment: in remission  Plan: The current medical regimen is effective;  continue present plan and medications.               Morelia Ayon NP  [facility]    Prior to injection verified patient identity using patient's name and date of birth.  Due to injection administration,  patient instructed to remain in clinic for 15 minutes  afterwards, and to report any adverse reaction to me immediately.      Injectable Influenza Immunization Documentation    1.  Is the person to be vaccinated sick today?   No    2. Does the person to be vaccinated have an allergy to a component   of the vaccine?   No  Egg Allergy Algorithm Link    3. Has the person to be vaccinated ever had a serious reaction   to influenza vaccine in the past?   No    4. Has the person to be vaccinated ever had Guillain-Barré syndrome?   No    Form completed by Shannon Tello MA

## 2018-10-24 LAB
BACTERIA SPEC CULT: NORMAL
BACTERIA SPEC CULT: NORMAL
SPECIMEN SOURCE: NORMAL

## 2018-10-28 DIAGNOSIS — J32.0 CHRONIC MAXILLARY SINUSITIS: ICD-10-CM

## 2018-10-29 RX ORDER — FLUTICASONE PROPIONATE 50 MCG
SPRAY, SUSPENSION (ML) NASAL
Qty: 48 G | Refills: 3 | Status: SHIPPED | OUTPATIENT
Start: 2018-10-29 | End: 2020-01-26

## 2018-10-29 NOTE — TELEPHONE ENCOUNTER
"Requested Prescriptions   Pending Prescriptions Disp Refills     fluticasone (FLONASE) 50 MCG/ACT spray [Pharmacy Med Name: FLUTICASONE 50MCG NASAL SP (120) RX]  Last Written Prescription Date:  10-9-17  Last Fill Quantity: 48 ml,  # refills: PRN   Last office visit: 10-23-18 with prescribing provider:  Melly Ayon    Future Office Visit:    48 mL 0     Sig: SHAKE WELL AND USE 2 SPRAYS IN EACH NOSTRIL DAILY    Inhaled Steroids Protocol Passed    10/28/2018  3:44 AM       Passed - Patient is age 12 or older       Passed - Recent (12 mo) or future (30 days) visit within the authorizing provider's specialty    Patient had office visit in the last 12 months or has a visit in the next 30 days with authorizing provider or within the authorizing provider's specialty.  See \"Patient Info\" tab in inbasket, or \"Choose Columns\" in Meds & Orders section of the refill encounter.            "

## 2018-10-29 NOTE — TELEPHONE ENCOUNTER
Prescription approved per [facility] Refill Protocol.    Signed Prescriptions:                        Disp   Refills    fluticasone (FLONASE) 50 MCG/ACT spray     48 g   3        Sig: SHAKE WELL AND USE 2 SPRAYS IN EACH NOSTRIL DAILY  Authorizing Provider: WHIT ACEVEDO  Ordering User: ZACK GARCIA      Closing encounter - no further actions needed at this time    Zack Garcia RN

## 2018-11-12 ENCOUNTER — OFFICE VISIT (OUTPATIENT)
Dept: OPHTHALMOLOGY | Facility: CLINIC | Age: 63
End: 2018-11-12
Attending: OPHTHALMOLOGY
Payer: COMMERCIAL

## 2018-11-12 DIAGNOSIS — H26.492 POSTERIOR CAPSULAR OPACIFICATION VISUALLY SIGNIFICANT, LEFT EYE: Primary | ICD-10-CM

## 2018-11-12 PROCEDURE — 40000809 ZZH STATISTIC NO DOCUMENTATION TO SUPPORT CHARGE

## 2018-11-12 PROCEDURE — 66821 AFTER CATARACT LASER SURGERY: CPT | Mod: LT,ZF | Performed by: OPHTHALMOLOGY

## 2018-11-12 PROCEDURE — G0463 HOSPITAL OUTPT CLINIC VISIT: HCPCS | Mod: 25,ZF

## 2018-11-12 ASSESSMENT — VISUAL ACUITY
METHOD: SNELLEN - LINEAR
OS_PH_SC: 20/30
OS_SC: 20/40
OD_SC: 20/25
OD_SC+: -1

## 2018-11-12 ASSESSMENT — CONF VISUAL FIELD
OS_NORMAL: 1
OD_NORMAL: 1
METHOD: COUNTING FINGERS

## 2018-11-12 ASSESSMENT — TONOMETRY
OD_IOP_MMHG: 16
OS_IOP_MMHG: 16
IOP_METHOD: TONOPEN

## 2018-11-12 ASSESSMENT — SLIT LAMP EXAM - LIDS
COMMENTS: NORMAL
COMMENTS: NORMAL

## 2018-11-12 ASSESSMENT — EXTERNAL EXAM - RIGHT EYE: OD_EXAM: NORMAL

## 2018-11-12 ASSESSMENT — CUP TO DISC RATIO
OD_RATIO: 0.3
OS_RATIO: 0.3

## 2018-11-12 NOTE — MR AVS SNAPSHOT
After Visit Summary   11/12/2018    Hafsa Corona    MRN: [id number]           Patient Information     Date Of Birth          1955        Visit Information        Provider Department      11/12/2018 12:45 PM Mary Jo Massey MD [facility]         Follow-ups after your visit        Your next 10 appointments already scheduled     Nov 28, 2018 12:30 PM CST   RETURN GENERAL with Mary Jo Massey MD   [facility] ([facility])    [address]  9[address] Clin [address]  [facility] [address]   [phone]            Dec 06, 2018  3:00 PM CST   [facility] Draw with [facility] DRAW   [facility] Draw ([facility])    [address]  [address]  [facility] [address] [phone]            Dec 07, 2018  2:00 PM CST   (Arrive by 1:45 PM)   Return Visit with MARLON Ogden CNP   [facility] ([facility])    [address]  [address]  [facility] [address]   [phone]            Apr 09, 2019  7:00 PM CDT   MR BREAST BILATERAL W/O & W CONTRAST with [address] MRI ([facility])    [address]  1st Floor  [facility] [address] [phone]           How do I prepare for my exam? (Food and drink instructions) **If you will be receiving sedation or general anesthesia, please see special notes below.**  How do I prepare for my exam? (Other instructions) Take your medicines as usual, unless your doctor tells you not to. The timing of your exam may depend on the start of your last period. If you re in menopause, you may have the exam anytime.  You will have IV contrast for this exam.  You do not need to do anything special to prepare. **If you will be receiving sedation or general anesthesia, please see special notes below.**  What should I wear:  The MRI machine uses a strong magnet. Please wear clothes without  metal (snaps, zippers). A sweatsuit works well, or we may give you a hospital gown. Please remove any body piercings and hair extensions before you arrive. You will also remove watches, jewelry, hairpins, wallets, dentures, partial dental plates and hearing aids. You may wear contact lenses, and you may be able to wear your rings. We have a safe place to keep your personal items, but it is safer to leave them at home.  How long does the exam take:  Most tests take 30 to 60 minutes.  HOWEVER, IF YOUR DOCTOR PRESCRIBES ANESTHESIA please plan on spending four to five hours in the recovery room.  What should I bring: Bring a list of your current medicines to your exam (including vitamins, minerals and over-the-counter drugs). Please bring any previous mammograms or breast MRIs from other facilities to the MRI dept. Do not mail these items to us.  Do I need a : **If you will be receiving sedation or general anesthesia, please see special notes below.**  What should I do after the exam: No Restrictions, You may resume normal activities.  What is this test: MRI (magnetic resonance imaging) uses a strong magnet and radio waves to look inside the body. An MRA (magnetic resonance angiogram) does the same thing, but it lets us look at your blood vessels. A computer turns the radio waves into pictures showing cross sections of the body, much like slices of bread. This helps us see any problems more clearly. You may receive fluid (called  contrast ) before or during your scan. The fluid helps us see the pictures better. We give the fluid through an IV (small needle in your arm).  Who should I call with questions: If you have any questions, please contact your Imaging Department exam site. Directions, parking instructions, and other information is available on our website, [email or web address].  How do I prepare if I m having sedation or anesthesia? **IMPORTANT** THE INSTRUCTIONS BELOW ARE ONLY FOR THOSE PATIENTS WHO HAVE  BEEN TOLD THEY WILL RECEIVE SEDATION OR GENERAL ANESTHESIA DURING THEIR MRI PROCEDURE:  IF YOU WILL RECEIVE SEDATION (take medicine to help you relax during your exam) You must get the medicine from your doctor before you arrive. Bring the medicine to the exam. Do not take it at home. Arrive one hour early. Bring someone who can take you home after the test. Your medicine will make you sleepy. After the exam, you may not drive, take a bus or take a taxi by yourself. No eating 8 hours before your exam. You may have clear liquids up until 4 hours before your exam. (Clear liquids include water, clear tea, black coffee and fruit juice without pulp.)  IF YOU WILL RECEIVE ANESTHESIA (be asleep for your exam) Arrive 1 1/2 hours early. Bring someone who can take you home after the test. You may not drive, take a bus or take a taxi by yourself. No eating 8 hours before your exam. You may have clear liquids up until 4 hours before your exam. (Clear liquids include water, clear tea, black coffee and fruit juice without pulp.)            Sep 12, 2019 11:00 AM CDT   RETURN CLOTTING DISORDER with William Villarreal MD   [facility] ([facility])    [facility] [address]  [address]  [facility] [id number] [phone]            Sep 27, 2019  9:30 AM CDT   (Arrive by 9:15 AM)   Return Visit with MARLON Chiu [facility] ([facility])    [address]  [address]  [facility] [id number] [phone]              Who to contact     Please call your clinic at [phone] to:    Ask questions about your health    Make or cancel appointments    Discuss your medicines    Learn about your test results    Speak to your doctor            Additional Information About Your Visit        [employer] Information     [employer] gives you secure access to your electronic health record. If you see a  primary care provider, you can also send messages to your care team and make appointments. If you have questions, please call your primary care clinic.  If you do not have a primary care provider, please call [phone] and they will assist you.      [employer] is an electronic gateway that provides easy, online access to your medical records. With [employer] you can request a clinic appointment, read your test results, renew a prescription or communicate with your care team.     To access your existing account, please contact your [facility] or call [phone] for assistance.        Care EveryWhere ID     This is your Care EveryWhere ID. This could be used by other organizations to access your [place] medical records  [id number]        Your Vitals Were     Last Period                   01/01/2009            Blood Pressure from Last 3 Encounters:   10/23/18 102/71   10/06/18 126/85   09/21/18 (!) 137/103    Weight from Last 3 Encounters:   10/23/18 92.8 kg (204 lb 8 oz)   10/06/18 91.6 kg (202 lb)   09/21/18 93.2 kg (205 lb 8 oz)              Today, you had the following     No orders found for display       Primary Care Provider Office Phone # Fax #    Morelia Ayon [phone]       [address] [facility] [address]        Equal Access to Services     RONEY ARANDA : Hadii aad ku hadasho Soomaali, waaxda luqadaha, qaybta kaalmada adeegyada, sujatha friedman. So [facility] [phone].    ATENCIÓN: Si habla español, tiene a martínez disposición servicios gratuitos de asistencia lingüística. Llame al [phone].    We comply with applicable federal civil rights laws and [place] laws. We do not discriminate on the basis of race, color, national origin, age, disability, sex, sexual orientation, or gender identity.            Thank you!     Thank you for choosing [facility]  for your care. Our goal is always to provide you with excellent care.  Hearing back from our patients is one way we can continue to improve our services. Please take a few minutes to complete the written survey that you may receive in the mail after your visit with us. Thank you!             Your Updated Medication List - Protect others around you: Learn how to safely use, store and throw away your medicines at www.disposemymeds.org.          This list is accurate as of 11/12/18  2:01 PM.  Always use your most recent med list.                   Brand Name Dispense Instructions for use Diagnosis    atorvastatin 80 MG tablet    LIPITOR    90 tablet    TAKE 1 TABLET(80 MG) BY MOUTH DAILY    Hyperlipidemia LDL goal <100       BENADRYL PO      Take 50 mg by mouth daily as needed (monthly)        blood glucose lancets standard    no brand specified    100 each    Use to test blood sugar 2 times daily or as directed.    Type 2 diabetes mellitus without complication, without long-term current use of insulin (H)       blood glucose monitoring meter device kit    no brand specified    1 kit    Use to test blood sugar 2 times daily or as directed.    Type 2 diabetes mellitus without complication, without long-term current use of insulin (H)       blood glucose monitoring test strip    no brand specified    100 strip    Use to test blood sugars 2 times daily or as directed    Type 2 diabetes mellitus without complication, without long-term current use of insulin (H)       CALCIUM-MAGNESIUM-VITAMIN D PO      Take 2 tablets by mouth daily        EPINEPHrine 0.3 MG/0.3ML injection 2-pack    EPIPEN/ADRENACLICK/or ANY BX GENERIC EQUIV    0.3 mL    Inject 0.3 mLs (0.3 mg) into the muscle once as needed for anaphylaxis    Bee allergy status       escitalopram 20 MG tablet    LEXAPRO    30 tablet    Take 1 tablet (20 mg) by mouth daily    Major depressive disorder, recurrent episode, mild (H)       fluticasone 50 MCG/ACT spray    FLONASE    48 g    SHAKE WELL AND USE 2 SPRAYS IN EACH NOSTRIL DAILY     Chronic maxillary sinusitis       lisinopril 10 MG tablet    PRINIVIL/ZESTRIL    90 tablet    Take 1 tablet (10 mg) by mouth daily    Essential hypertension       LORazepam 1 MG tablet    ATIVAN    30 tablet    Take 1 tablet (1 mg) by mouth every 6 hours as needed (nausea/vomiting, anxiety or sleep)    Ovarian cancer, left (H), Encounter for long-term (current) use of medications       MAGNESIA PO      Take 500 mg by mouth        metFORMIN 500 MG 24 hr tablet    GLUCOPHAGE-XR    180 tablet    TAKE 2 TABLETS BY MOUTH ONCE DAILY WITH EVENING MEAL    Type 2 diabetes mellitus without complication, without long-term current use of insulin (H)       MULTIVITAMIN ADULT PO      Take 1 tablet by mouth daily        nitroFURantoin (macrocrystal-monohydrate) 100 MG capsule    MACROBID    30 capsule    Take one capsule after intercourse as needed    History of recurrent urinary tract infection       order for DME     1 Units    Home blood pressure monitor    Type 2 diabetes mellitus without complication, without long-term current use of insulin (H)       PRO-BIOTIC BLEND PO      Take 1 capsule by mouth daily Enzymatic Therapy-probiotic pearls        rivaroxaban ANTICOAGULANT 20 MG Tabs tablet    XARELTO    30 tablet    Take 1 tablet (20 mg) by mouth daily (with dinner)    Long-term (current) use of anticoagulants, Acute deep vein thrombosis (DVT) of left lower extremity, unspecified vein (H)       traZODone 50 MG tablet    DESYREL    180 tablet    TAKE 1 TO 2 TABLETS(50  MG) BY MOUTH EVERY NIGHT AS NEEDED FOR SLEEP    Insomnia, unspecified type       TYLENOL PO      Take 500 mg by mouth 2 tabs prn pain (monthly)        VITAMIN B-COMPLEX PO      Take 50 mg by mouth        vitamin D 1000 units capsule      Take 2,000 Units by mouth daily

## 2018-11-12 NOTE — NURSING NOTE
Chief Complaints and History of Present Illnesses   Patient presents with     Follow Up For     YAG laser LE scheduled for today     HPI    Affected eye(s):  Left   Symptoms:     No floaters   No flashes   No redness   No tearing   No Dryness         Do you have eye pain now?:  No      Comments:  Pt here for a YAG laser LE scheduled for today. Pt note RE vision has improved. LE vision is about the same since last visit.     CARLITOS Breen 12:54 PM November 12, 2018

## 2018-11-12 NOTE — PROGRESS NOTES
Patient here for YAG capsulotomy left eye for visually significant posterior capsular opacification. Please see Imaging/Proc tab for procedure details.    Mary Jo Massey MD  Comprehensive Ophthalmology & Ocular Pathology  Department of Ophthalmology and Visual Neurosciences  [email or web address]  Pager [phone]

## 2018-11-28 ENCOUNTER — OFFICE VISIT (OUTPATIENT)
Dept: OPHTHALMOLOGY | Facility: CLINIC | Age: 63
End: 2018-11-28
Attending: OPHTHALMOLOGY
Payer: COMMERCIAL

## 2018-11-28 DIAGNOSIS — H52.203 MYOPIC ASTIGMATISM OF BOTH EYES: Primary | ICD-10-CM

## 2018-11-28 DIAGNOSIS — H52.4 PRESBYOPIA: ICD-10-CM

## 2018-11-28 DIAGNOSIS — Z96.1 PSEUDOPHAKIA, BOTH EYES: ICD-10-CM

## 2018-11-28 DIAGNOSIS — H52.13 MYOPIC ASTIGMATISM OF BOTH EYES: Primary | ICD-10-CM

## 2018-11-28 PROCEDURE — 92015 DETERMINE REFRACTIVE STATE: CPT | Mod: ZF

## 2018-11-28 PROCEDURE — G0463 HOSPITAL OUTPT CLINIC VISIT: HCPCS | Mod: ZF

## 2018-11-28 ASSESSMENT — VISUAL ACUITY
OD_SC+: -2
OS_PH_SC: 20/20-1
OD_SC: 20/30
OS_SC+: -1
METHOD: SNELLEN - LINEAR
OS_SC: 20/30
OD_PH_SC: 20/20-1

## 2018-11-28 ASSESSMENT — REFRACTION_MANIFEST
OD_CYLINDER: SPHERE
OS_AXIS: 180
OS_SPHERE: -1.50
OS_ADD: +2.50
OD_ADD: +2.50
OD_SPHERE: -0.75
OS_CYLINDER: +0.75

## 2018-11-28 ASSESSMENT — TONOMETRY
OD_IOP_MMHG: 13
IOP_METHOD: TONOPEN
OS_IOP_MMHG: 13

## 2018-11-28 ASSESSMENT — REFRACTION_WEARINGRX
OS_ADD: +2.50
OS_CYLINDER: +1.25
OS_CYLINDER: +1.25
OS_SPHERE: -1.25
OD_CYLINDER: +0.50
OD_AXIS: 070
OS_AXIS: 155
OD_SPHERE: +1.25
OD_ADD: +2.50
OS_SPHERE: +1.25
OD_AXIS: 070
OD_CYLINDER: +0.50
OD_SPHERE: -1.25
OS_AXIS: 155

## 2018-11-28 ASSESSMENT — EXTERNAL EXAM - RIGHT EYE: OD_EXAM: NORMAL

## 2018-11-28 ASSESSMENT — CUP TO DISC RATIO
OD_RATIO: 0.3
OS_RATIO: 0.3

## 2018-11-28 ASSESSMENT — SLIT LAMP EXAM - LIDS
COMMENTS: NORMAL
COMMENTS: NORMAL

## 2018-11-28 ASSESSMENT — CONF VISUAL FIELD
OD_NORMAL: 1
OS_NORMAL: 1
METHOD: COUNTING FINGERS

## 2018-11-28 NOTE — PROGRESS NOTES
HPI  Hafsa Corona is a 63 year old female here for follow-up after YAG capsulotomy now both eyes. She feels the vision is clear and the eye comfortable. She is very pleased with her results.    POH: Pseudophakia s/p YAG cap both eyes  PMH: S/p kidney surgery with one functional kidney, diabetes.    Assessment & Plan     (H52.203,  H52.13) Myopic astigmatism of both eyes  (primary encounter diagnosis)  (Z96.1) Pseudophakia, both eyes  (primary encounter diagnosis)  (H26.491) Posterior capsular opacification, right  Comment: Excellent outcome.   Plan: Given updated glasses Rx.     -----------------------------------------------------------------------------------    Patient disposition:   Return in about 1 year (around 11/28/2019). or sooner as needed.    Teaching statement:  Complete documentation of historical and exam elements from today's encounter can be found in the full encounter summary report (not reduplicated in this progress note). I personally obtained the chief complaint(s) and history of present illness.  I confirmed and edited as necessary the review of systems, past medical/surgical history, family history, social history, and examination findings as documented by others; and I examined the patient myself. I personally reviewed the relevant tests, images, and reports as documented above.     I formulated and edited as necessary the assessment and plan and discussed the findings and management plan with the patient and family.    Mary Jo Massey MD  Comprehensive Ophthalmology & Ocular Pathology  Department of Ophthalmology and Visual Neurosciences  [email or web address]  Pager [phone]

## 2018-11-28 NOTE — NURSING NOTE
Chief Complaints and History of Present Illnesses   Patient presents with     Follow Up For     2 week follow up s/p YAG both eyes     HPI    Affected eye(s):  Both   Symptoms:     Floaters (Comment: Occasional floater that comes and goes, no changes)   No flashes   No redness   No tearing   No itching         Do you have eye pain now?:  No      Comments:  Pt states vision has improved since last visit. No eye pain today.   DM2 BS: pt doesn't check sugars daily.  Lab Results       Component                Value               Date                       A1C                      5.6                 05/10/2018                 A1C                      6.1                 08/31/2017                 A1C                      6.0                 04/11/2017                 A1C                      6.2                 09/06/2016                 A1C                      6.0                 02/04/2016              [facility] November 28, 2018 12:51 PM

## 2018-11-28 NOTE — MR AVS SNAPSHOT
After Visit Summary   11/28/2018    Hafsa Corona    MRN: [id number]           Patient Information     Date Of Birth          1955        Visit Information        Provider Department      11/28/2018 12:30 PM [facility]        Today's Diagnoses     Myopic astigmatism of both eyes    -  1    Pseudophakia, both eyes        Presbyopia           Follow-ups after your visit        Follow-up notes from your care team     Return in about 1 year (around 11/28/2019).      Your next 10 appointments already scheduled     Dec 06, 2018  3:00 PM CST   [facility] Draw with [facility]   [facility] ([facility])    [address]  [address]  [place] [address]   [phone]            Dec 07, 2018  2:00 PM CST   (Arrive by 1:45 PM)   Return Visit with MARLON Ogden CNP   [facility] ([facility])    [address]  [address]  [place] [address]   [phone]            Apr 09, 2019  7:00 PM CDT   MR BREAST BILATERAL W/O & W CONTRAST with [address] [facility] ([facility])    [address]  1st Floor  [place] [address] [phone]           How do I prepare for my exam? (Food and drink instructions) **If you will be receiving sedation or general anesthesia, please see special notes below.**  How do I prepare for my exam? (Other instructions) Take your medicines as usual, unless your doctor tells you not to. The timing of your exam may depend on the start of your last period. If you re in menopause, you may have the exam anytime.  You will have IV contrast for this exam.  You do not need to do anything special to prepare. **If you will be receiving sedation or general anesthesia, please see special notes below.**  What should I wear:  The MRI machine uses a strong magnet. Please wear clothes without metal (snaps,  zippers). A sweatsuit works well, or we may give you a hospital gown. Please remove any body piercings and hair extensions before you arrive. You will also remove watches, jewelry, hairpins, wallets, dentures, partial dental plates and hearing aids. You may wear contact lenses, and you may be able to wear your rings. We have a safe place to keep your personal items, but it is safer to leave them at home.  How long does the exam take:  Most tests take 30 to 60 minutes.  HOWEVER, IF YOUR DOCTOR PRESCRIBES ANESTHESIA please plan on spending four to five hours in the recovery room.  What should I bring: Bring a list of your current medicines to your exam (including vitamins, minerals and over-the-counter drugs). Please bring any previous mammograms or breast MRIs from other facilities to the MRI dept. Do not mail these items to us.  Do I need a : **If you will be receiving sedation or general anesthesia, please see special notes below.**  What should I do after the exam: No Restrictions, You may resume normal activities.  What is this test: MRI (magnetic resonance imaging) uses a strong magnet and radio waves to look inside the body. An MRA (magnetic resonance angiogram) does the same thing, but it lets us look at your blood vessels. A computer turns the radio waves into pictures showing cross sections of the body, much like slices of bread. This helps us see any problems more clearly. You may receive fluid (called  contrast ) before or during your scan. The fluid helps us see the pictures better. We give the fluid through an IV (small needle in your arm).  Who should I call with questions: If you have any questions, please contact your Imaging Department exam site. Directions, parking instructions, and other information is available on our website, [email or web address].  How do I prepare if I m having sedation or anesthesia? **IMPORTANT** THE INSTRUCTIONS BELOW ARE ONLY FOR THOSE PATIENTS WHO HAVE BEEN TOLD THEY  WILL RECEIVE SEDATION OR GENERAL ANESTHESIA DURING THEIR MRI PROCEDURE:  IF YOU WILL RECEIVE SEDATION (take medicine to help you relax during your exam) You must get the medicine from your doctor before you arrive. Bring the medicine to the exam. Do not take it at home. Arrive one hour early. Bring someone who can take you home after the test. Your medicine will make you sleepy. After the exam, you may not drive, take a bus or take a taxi by yourself. No eating 8 hours before your exam. You may have clear liquids up until 4 hours before your exam. (Clear liquids include water, clear tea, black coffee and fruit juice without pulp.)  IF YOU WILL RECEIVE ANESTHESIA (be asleep for your exam) Arrive 1 1/2 hours early. Bring someone who can take you home after the test. You may not drive, take a bus or take a taxi by yourself. No eating 8 hours before your exam. You may have clear liquids up until 4 hours before your exam. (Clear liquids include water, clear tea, black coffee and fruit juice without pulp.)            Sep 12, 2019 11:00 AM CDT   RETURN CLOTTING DISORDER with William Villarreal MD   [facility] ([facility])    [facility]  [address]  [facility] [address] [phone]            Sep 27, 2019  9:30 AM CDT   (Arrive by 9:15 AM)   Return Visit with MARLON Chiu [facility] ([facility])    [address] [place] [address]  [address]  [facility] [id number] [phone]              Who to contact     Please call your clinic at [phone] to:    Ask questions about your health    Make or cancel appointments    Discuss your medicines    Learn about your test results    Speak to your doctor            Additional Information About Your Visit        [employer] Information     [employer] gives you secure access to your electronic health record. If you see a primary care  provider, you can also send messages to your care team and make appointments. If you have questions, please call your primary care clinic.  If you do not have a primary care provider, please call [phone] and they will assist you.      [employer] is an electronic gateway that provides easy, online access to your medical records. With [employer], you can request a clinic appointment, read your test results, renew a prescription or communicate with your care team.     To access your existing account, please contact your [facility] or call [phone] for assistance.        Care EveryWhere ID     This is your Care EveryWhere ID. This could be used by other organizations to access your [facility] medical records  [id number]        Your Vitals Were     Last Period                   01/01/2009            Blood Pressure from Last 3 Encounters:   10/23/18 102/71   10/06/18 126/85   09/21/18 (!) 137/103    Weight from Last 3 Encounters:   10/23/18 92.8 kg (204 lb 8 oz)   10/06/18 91.6 kg (202 lb)   09/21/18 93.2 kg (205 lb 8 oz)              Today, you had the following     No orders found for display       Primary Care Provider Office Phone # Fax #    Morelia Ayon [phone]       [address] [facility] [address]        Equal Access to Services     RONEY ARANDA : Hadii aad ku hadasho Soomaali, waaxda luqadaha, qaybta kaalmada adeegyada, waxay mini hayalessandro betts . So [facility] [phone].    ATENCIÓN: Si habla español, tiene a martínez disposición servicios gratuitos de asistencia lingüística. Llame al [phone].    We comply with applicable federal civil rights laws and [place] laws. We do not discriminate on the basis of race, color, national origin, age, disability, sex, sexual orientation, or gender identity.            Thank you!     Thank you for choosing [facility]  for your care. Our goal is always to provide you with excellent care. Hearing back  from our patients is one way we can continue to improve our services. Please take a few minutes to complete the written survey that you may receive in the mail after your visit with us. Thank you!             Your Updated Medication List - Protect others around you: Learn how to safely use, store and throw away your medicines at www.disposemymeds.org.          This list is accurate as of 11/28/18  1:30 PM.  Always use your most recent med list.                   Brand Name Dispense Instructions for use Diagnosis    atorvastatin 80 MG tablet    LIPITOR    90 tablet    TAKE 1 TABLET(80 MG) BY MOUTH DAILY    Hyperlipidemia LDL goal <100       BENADRYL PO      Take 50 mg by mouth daily as needed (monthly)        blood glucose lancets standard    NO BRAND SPECIFIED    100 each    Use to test blood sugar 2 times daily or as directed.    Type 2 diabetes mellitus without complication, without long-term current use of insulin (H)       blood glucose monitoring meter device kit    NO BRAND SPECIFIED    1 kit    Use to test blood sugar 2 times daily or as directed.    Type 2 diabetes mellitus without complication, without long-term current use of insulin (H)       blood glucose monitoring test strip    NO BRAND SPECIFIED    100 strip    Use to test blood sugars 2 times daily or as directed    Type 2 diabetes mellitus without complication, without long-term current use of insulin (H)       CALCIUM-MAGNESIUM-VITAMIN D PO      Take 2 tablets by mouth daily        EPINEPHrine 0.3 MG/0.3ML injection 2-pack    EPIPEN/ADRENACLICK/or ANY BX GENERIC EQUIV    0.3 mL    Inject 0.3 mLs (0.3 mg) into the muscle once as needed for anaphylaxis    Bee allergy status       escitalopram 20 MG tablet    LEXAPRO    30 tablet    Take 1 tablet (20 mg) by mouth daily    Major depressive disorder, recurrent episode, mild (H)       fluticasone 50 MCG/ACT nasal spray    FLONASE    48 g    SHAKE WELL AND USE 2 SPRAYS IN EACH NOSTRIL DAILY    Chronic  maxillary sinusitis       lisinopril 10 MG tablet    PRINIVIL/ZESTRIL    90 tablet    Take 1 tablet (10 mg) by mouth daily    Essential hypertension       LORazepam 1 MG tablet    ATIVAN    30 tablet    Take 1 tablet (1 mg) by mouth every 6 hours as needed (nausea/vomiting, anxiety or sleep)    Ovarian cancer, left (H), Encounter for long-term (current) use of medications       MAGNESIA PO      Take 500 mg by mouth        metFORMIN 500 MG 24 hr tablet    GLUCOPHAGE-XR    180 tablet    TAKE 2 TABLETS BY MOUTH ONCE DAILY WITH EVENING MEAL    Type 2 diabetes mellitus without complication, without long-term current use of insulin (H)       MULTIVITAMIN ADULT PO      Take 1 tablet by mouth daily        nitroFURantoin macrocrystal-monohydrate 100 MG capsule    MACROBID    30 capsule    Take one capsule after intercourse as needed    History of recurrent urinary tract infection       order for DME     1 Units    Home blood pressure monitor    Type 2 diabetes mellitus without complication, without long-term current use of insulin (H)       PRO-BIOTIC BLEND PO      Take 1 capsule by mouth daily Enzymatic Therapy-probiotic pearls        rivaroxaban ANTICOAGULANT 20 MG Tabs tablet    XARELTO    30 tablet    Take 1 tablet (20 mg) by mouth daily (with dinner)    Long-term (current) use of anticoagulants, Acute deep vein thrombosis (DVT) of left lower extremity, unspecified vein (H)       traZODone 50 MG tablet    DESYREL    180 tablet    TAKE 1 TO 2 TABLETS(50  MG) BY MOUTH EVERY NIGHT AS NEEDED FOR SLEEP    Insomnia, unspecified type       TYLENOL PO      Take 500 mg by mouth 2 tabs prn pain (monthly)        VITAMIN B-COMPLEX PO      Take 50 mg by mouth        vitamin D 1000 units capsule      Take 2,000 Units by mouth daily

## 2018-12-06 DIAGNOSIS — Z08 ENCOUNTER FOR FOLLOW-UP SURVEILLANCE OF OVARIAN CANCER: ICD-10-CM

## 2018-12-06 DIAGNOSIS — Z85.43 ENCOUNTER FOR FOLLOW-UP SURVEILLANCE OF OVARIAN CANCER: ICD-10-CM

## 2018-12-06 LAB — CANCER AG125 SERPL-ACNC: 10 U/ML (ref 0–30)

## 2018-12-06 PROCEDURE — 25000128 H RX IP 250 OP 636: Performed by: NURSE PRACTITIONER

## 2018-12-06 PROCEDURE — 86304 IMMUNOASSAY TUMOR CA 125: CPT | Performed by: NURSE PRACTITIONER

## 2018-12-06 RX ORDER — HEPARIN SODIUM (PORCINE) LOCK FLUSH IV SOLN 100 UNIT/ML 100 UNIT/ML
5 SOLUTION INTRAVENOUS ONCE
Status: COMPLETED | OUTPATIENT
Start: 2018-12-06 | End: 2018-12-06

## 2018-12-06 RX ADMIN — SODIUM CHLORIDE, PRESERVATIVE FREE 5 ML: 5 INJECTION INTRAVENOUS at 15:24

## 2018-12-06 NOTE — NURSING NOTE
"Chief Complaint   Patient presents with     Port Draw     labs drawn via port by rn.      Port accessed by RN with 20 G 3/4\" gripper needle, labs drawn from port. Flushed with saline and heparin. Port de-accessed.   Nanci Mercado RN     "

## 2018-12-12 ENCOUNTER — ONCOLOGY VISIT (OUTPATIENT)
Dept: ONCOLOGY | Facility: CLINIC | Age: 63
End: 2018-12-12
Attending: NURSE PRACTITIONER
Payer: COMMERCIAL

## 2018-12-12 VITALS
TEMPERATURE: 98.7 F | DIASTOLIC BLOOD PRESSURE: 79 MMHG | OXYGEN SATURATION: 94 % | WEIGHT: 210 LBS | BODY MASS INDEX: 36.61 KG/M2 | SYSTOLIC BLOOD PRESSURE: 115 MMHG | HEART RATE: 93 BPM

## 2018-12-12 DIAGNOSIS — Z85.43 ENCOUNTER FOR FOLLOW-UP SURVEILLANCE OF OVARIAN CANCER: Primary | ICD-10-CM

## 2018-12-12 DIAGNOSIS — Z08 ENCOUNTER FOR FOLLOW-UP SURVEILLANCE OF OVARIAN CANCER: Primary | ICD-10-CM

## 2018-12-12 PROCEDURE — 99213 OFFICE O/P EST LOW 20 MIN: CPT | Mod: ZP | Performed by: NURSE PRACTITIONER

## 2018-12-12 PROCEDURE — G0463 HOSPITAL OUTPT CLINIC VISIT: HCPCS | Mod: ZF

## 2018-12-12 RX ORDER — LISDEXAMFETAMINE DIMESYLATE 50 MG/1
50 CAPSULE ORAL
COMMUNITY
Start: 2016-03-04 | End: 2020-01-13

## 2018-12-12 ASSESSMENT — PAIN SCALES - GENERAL: PAINLEVEL: MILD PAIN (2)

## 2018-12-12 NOTE — NURSING NOTE
"Oncology Rooming Note    December 12, 2018 3:11 PM   Hafsa Corona is a 63 year old female who presents for:    Chief Complaint   Patient presents with     Oncology Clinic Visit     Return; Ovarian CA 3 month FU     Initial Vitals: Pulse 93   Temp 98.7  F (37.1  C) (Oral)   Wt 95.3 kg (210 lb)   LMP 01/01/2009   SpO2 94%   BMI 36.61 kg/m   Estimated body mass index is 36.61 kg/m  as calculated from the following:    Height as of 9/21/18: 1.613 m (5' 3.5\").    Weight as of this encounter: 95.3 kg (210 lb). Body surface area is 2.07 meters squared.  Mild Pain (2) Comment: knees/joint pain - Tylenol around 11am today   Patient's last menstrual period was 01/01/2009.  Allergies reviewed: Yes  Medications reviewed: Yes    Medications: Medication refills not needed today.  Pharmacy name entered into [employer]: [id number] - [place], [place] - [address] AT [facility] [address]    Clinical concerns: No Concerns Augustina was NOT notified.    7 minutes for nursing intake (face to face time)     Alina Pedersen MA            "

## 2018-12-12 NOTE — PROGRESS NOTES
Gynecologic Oncology Follow-Up Visit    RE: Hafsa Corona  MRN: [id number]  : 1955  Date of Visit: 2018    CC: Hafsa Corona  is a 63 year old female with a history of stage IC2 clear cell ovarian carcinoma. She completed treatment with surgery and three cycles of chemotherapy on 10/17/17. She presents today for a three month surveillance visit.    HPI: Hafsa comes to the clinic feeling well from a gynecologic perspective. Her frequent UTIs have resolved with prophylactic nitrofurantoin post-intercourse. Denies sexual, pelvic, or vaginal concerns. Fatigue continues to improve, feels this is related to working full time. Chronic issues with arthralgias and difficulty sleeping, denies need for intervention. Up to date on seeing her PCP, breast screening, and influenza vaccine. Reports she is due for colonoscopy but is planning to wait until next year due to needing to take time off work. Having her port flushed every six weeks, would like to wait on having this out. Had chest CT at [place] 2018 which demonstrated stability of pulmonary nodules, due for two year stability fall of . Denies unintended weight loss,  weakness, changes in vision or hearing, shortness of breath, cough, chest pain, abdominal pain, dyspepsia, nausea, vomiting, constipation, bloating, dysuria, urinary frequency or urgency, pelvic pain, lower back pain, vaginal bleeding, vaginal discharge, or numbness/tingling.     Oncology History:  The patient has had a recent episode of lower abdominal pain in early July.  She was subsequently sent to the emergency room and was found to have a large 9 cm complex ovarian mass. She was admitted to the hospital for pain control.  7/3/17:  1187  17: Exploratory laparotomy, total abdominal hysterectomy, left salpingo-oophorectomy, cancer staging including infracolic omentectomy, bilateral pelvic & para-aortic lymphadenectomy, peritoneal biopsies, evacuation of pelvic fluid by Dr. Cole.     FINAL DIAGNOSIS:   A. LEFT OVARY AND FALLOPIAN TUBE, LEFT SALPINGO-OOPHORECTOMY:   - Ovarian clear cell carcinoma   - Background of endometriosis   - Fallopian tube with no significant histologic abnormality.   B. UTERUS, HYSTERECTOMY:   -  Inactive endometrium   -  Myometrium with adenomyosis and leiomyoma.   -  Cervix with squamous metaplasia.   C. PERITONEUM, RIGHT PELVIS, BIOPSY:   - Fibroadipose tissue, negative for malignancy.   D. LYMPH NODES, RIGHT PELVIC, DISSECTION:   - Thirteen benign lymph nodes (0/13).   E. LYMPH NODES, LEFT PELVIC, DISSECTION:   - Seven benign lymph nodes (0/7).   F. PERITONEUM, LEFT PELVIS, BIOPSY:   - Fibroadipose tissue, negative for malignancy.   G. PERITONEUM, BLADDER, BIOPSY:   - Fibroadipose tissue  with acute and chronic inflammation, negative for malignancy.   H. PERITONEUM, POSTERIOR CUL-DE-SAC, BIOPSY:   - Fibroadipose tissue, negative for malignancy.   I. PERITONEUM, LEFT PARACOLIC GUTTER, BIOPSY:   - Fibroadipose tissue, negative for malignancy.   J. LYMPH NODES, LEFT PARA-AORTIC, DISSECTION:   - Five benign lymph nodes (0/5).   K. LYMPH NODES, RIGHT PARA-AORTIC, DISSECTION:   - Two benign lymph nodes (0/2).   L. PERITONEUM, RIGHT PARACOLIC GUTTER, BIOPSY:   - Fibroadipose tissue, negative for malignancy.   M. OMENTUM, OMENTECTOMY:   - Adipose tissue with reactive changes, negative for malignancy.   COMMENT:   The final diagnosis confirms the interpretation provided intraoperatively.   Report Name: Ovary or Fallopian Tube   Status: Submitted   Part(s) Involved:   A: Ovary and fallopian tube, left   Synoptic Report:   CLINICAL   Clinical History:   - Pelvic mass   SPECIMEN   Procedure:   - Left salpingo-oophorectomy   - Hysterectomy   - Omentectomy   - Peritoneal  biopsies   Lymph Node Sampling:   - Performed   Location:   - Pelvic lymph nodes   - Para-aortic lymph nodes   Specimen Integrity:   - Left ovary   Specimen Integrity of Left Ovary:   - Capsule intact   TUMOR    Primary Tumor Site(s):   - Left ovary   Left Ovary   Tumor Size of Left Ovary: 19 cm   Histologic Type:   - Clear cell carcinoma   Tumor Extent   Ovarian Surface Involvement:   - Absent   Specimen(s)   Extent of Left Ovary:   - Involved   Extent of Left Fallopian Tube:   - Not involved   Extent of Omentum:   - Not involved   Extent of Uterus:   - Not involved   Extent of Peritoneum:   - Not involved   Peritoneal Ascitic Fluid:   - Not performed / unknown   Pleural Fluid:   - Not performed / unknown   LYMPH NODES     Number of Pelvic Lymph Nodes Examined: 20     Number of Pelvic Lymph Nodes Involved: None identified     Number of Para-aortic Lymph Nodes Examined: 7     Number of Para-Aortic Lymph Nodes Involved: None identified   STAGE (PTNM, AJCC 7TH ED.)   Primary Tumor (pT):   - Ovary   Ovarian Primary Tumor (pT):   - pT1a: Tumor limited to 1 ovary; capsule intact, no tumor on ovarian surface. No malignant cells in ascites or peritoneal washings#   Regional Lymph Nodes (pN):   - pN0: No regional lymph node metastasis       07/31/17: Dr Shaffer follow up: Discussed with the patient that given the high risk histology, as well as ruptured mass before surgery, she would be qualified at higher risk of recurrence despite early stage ovarian cancer.  Recommended adjuvant chemotherapy. Plan for carboplatin of AUC of 6 and paclitaxel of 175, m2 for 3 cycles. Referral for genetic counselor and will see her back after those 3 cycles  08/03/17: Call from patient stating she is going for a second opinion and would like chemotherapy rescheduled to week of 8/14/17.      08/16/17: Cycle #1 Carbo/Taxol.  73. Significant paclitaxel reaction.  08/30/17: C1 carboplatin/inpatient paclitaxel desensitization.   09/25/17: C2 carboplatin/paclitaxel- inpatient paclitaxel desensitization.  15.  10/16/17: C3 carboplatin/paclitaxel- switched to docetaxel given her neuropathy.  10.  11/14/17:  8. CT  CAP:  IMPRESSION:   1. Post surgical changes of hysterectomy and bilateral  salpingo-oophorectomy. Nodular soft tissue density in the posterior  cul-de-sac along with ill-defined nodular thickening in the left  pelvis, suspicious for residual disease or metastatic deposits.   2. There is a 3 cm mass in the superior pole of the right kidney,  possibly extending into the renal hilum. Represents RCC until proven  otherwise. Consider renal mass protocol CT to assess renal vein  involvement.  3. Stable sub-4 mm pulmonary nodules, continued attention on  follow-up.    11/16/17: CT renal mass protocol  IMPRESSION:  1. Arterially enhancing mass measuring 3.6 x 2.1 x 2.2 cm mass arising  from superior posterior of the right kidney, this is renal cell  carcinoma until proven otherwise.  2. Stable fat-containing umbilical hernia.    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 Margins negative. Three month surveillance plan with [facility].     2/26/18:  14    5/22/18:  11     8/23/18:  11    12/12/18:  10    Past Medical History:   Diagnosis Date     Anxiety state, unspecified     [id number]     Arthritis 2014    vague, gradual, not treated really, knees feel it     Attention deficit disorder without mention of hyperactivity      Cancer (H) 7/2017 and 1/2018    ovarian and kidney cancers, both treated well     Chronic rhinitis      Depressive disorder lifetime    plus Anxiety plus ADD. Escitalipram, therapy, ADD meds maybe     Depressive disorder, not elsewhere classified      Heart disease 1999    tachycardia and high cholesterol, managed     History of blood transfusion 7/2017    while in hospital for ovarian cancer     Hypertension 1999    tho BP was too LOW last week. past 12 years Generally OK     Panic disorder without agoraphobia      Pure hypercholesterolemia     Lipitor     Tachycardia      Tachycardia, unspecified     9/1999-2/2004     Type II or unspecified type  diabetes mellitus without mention of complication, not stated as uncontrolled     diagnosed 2004       Past Surgical History:   Procedure Laterality Date     AS REMV KIDNEY,RADICAL Right      BIOPSY  7/2017 and 2/2018    ovarian and kidney cancer biopsies. also 1987 breast benign     BREAST SURGERY  1987    date unsure. benign breast cyst removed     CATARACT IOL, RT/LT Left 05/18/2018     CATARACT IOL, RT/LT Right 07/1318     COLONOSCOPY  2008 and 2013    polyps removed. Next due fall 2018     HYSTERECTOMY TOTAL ABDOMINAL, BILATERAL SALPINGO-OOPHORECTOMY, NODE DISSECTION, COMBINED Left 7/7/2017    Procedure: COMBINED HYSTERECTOMY TOTAL ABDOMINAL, SALPINGO-OOPHORECTOMY, NODE DISSECTION;  Exploratory Laparotomy, Left Salpingo-Oophorectomy, Cancer Staging, Total Hysterectomy, Omentectomy, Evacuation of abdominal fluid, Lymph Node Dissection  Anesthesia Block ;  Surgeon: Serena Cole MD;  Location: [facility] OR     HYSTERECTOMY, PAP NO LONGER INDICATED  07/07/2017    Laparotomy; for ovarian cancer staging     HYSTERECTOMY, PAP NO LONGER INDICATED       INSERT PORT VASCULAR ACCESS Right 8/21/2017    Procedure: INSERT PORT VASCULAR ACCESS;  Single Lumen Chest Power Port;  Surgeon: Stephen Mike PA-C;  Location: [facility] OR     LYMPHADENECTOMY RETROPERITONEAL Bilateral 07/07/2017    Laparotomy; pelvics & para-aortics; for ovarian cancer staging     OMENTECTOMY  07/07/2017    Laparotomy; for ovarian cancer staging     ORTHOPEDIC SURGERY  1/2002    broken left jaw due to fall, 4 fractures, titanium plates     PHACOEMULSIFICATION CLEAR CORNEA WITH STANDARD INTRAOCULAR LENS IMPLANT Right 7/13/2018    Procedure: PHACOEMULSIFICATION CLEAR CORNEA WITH STANDARD INTRAOCULAR LENS IMPLANT;  RIght Eye Phacoemulsification Clear Cornea with Standard Intraocular Lens Placement ;  Surgeon: Mary Jo Massey MD;  Location: [facility] OR     PHACOEMULSIFICATION CLEAR CORNEA WITH TORIC INTRAOCULAR LENS IMPLANT Left 5/18/2018    Procedure:  PHACOEMULSIFICATION CLEAR CORNEA WITH TORIC INTRAOCULAR LENS IMPLANT;  Left Eye Phacoemulsification with Toric Lens;  Surgeon: Mary Jo Massey MD;  Location: [facility] OR     SALPINGO OOPHORECTOMY,R/L/KAYY Right 2008    Salpingo Oophorectomy, RT     SALPINGO OOPHORECTOMY,R/L/KAYY Left 07/07/2017    Laparotomy; for ovarian cancer staging     SURGICAL HISTORY OF -       facial surgery d/t fall in 1/2002     SURGICAL HISTORY OF -       1985 removal of breast cyst     SURGICAL HISTORY OF -   2008    endometrial ablation     YAG CAPSULOTOMY OD (RIGHT EYE)  10/22/2018       Social History     Socioeconomic History     Marital status: Single     Spouse name: Not on file     Number of children: 0     Years of education: Not on file     Highest education level: Not on file   Social Needs     Financial resource strain: Not on file     Food insecurity - worry: Not on file     Food insecurity - inability: Not on file     Transportation needs - medical: Not on file     Transportation needs - non-medical: Not on file   Occupational History     Employer: [employer]   Tobacco Use     Smoking status: Never Smoker     Smokeless tobacco: Never Used   Substance and Sexual Activity     Alcohol use: Yes     Comment: Very infrequent, a bit of wine or beer 2x per month maybe     Drug use: Yes     Types: Marijuana     Comment: infrequent, for celebrations only, maybe 8x per year     Sexual activity: Yes     Partners: Male     Birth control/protection: None     Comment: long-time    Other Topics Concern     Parent/sibling w/ CABG, MI or angioplasty before 65F 55M? No   Social History Narrative     Not on file       Family History   Problem Relation Age of Onset     C.A.D. Father      Diabetes Father         Later in his life, in his 70s     Hypertension Father      Cerebrovascular Disease Father         March 1984 or 85     Psychotic Disorder Father      Pancreatic Cancer Father      Coronary Artery Disease Father          diagnosed in his late 50s (age)     Hyperlipidemia Father         treated w statins     Other Cancer Father         pancreatic, ,  of this     Depression Father      Mental Illness Father         likely PTSD and depression     Obesity Father      C.A.D. Mother      Hypertension Mother      Breast Cancer Mother         2 times,  and ?     Psychotic Disorder Mother      Colon Cancer Mother         ?     Cancer Mother         Bone cancer     Coronary Artery Disease Mother         diagnosed in her late 70s (age)     Hyperlipidemia Mother         treated w. statins     Other Cancer Mother         bone/marrow, ,  of this     Depression Mother      Anxiety Disorder Mother      Mental Illness Mother         various undiagnosed types, but THERE     Obesity Mother      Prostate Problems Brother         cleared      Hypertension Brother      Hyperlipidemia Brother         unsure if treated w statins     Depression Brother      Anxiety Disorder Brother      Mental Illness Brother         undiagnosed but THERE     Obesity Brother      Psychotic Disorder Sister         x2     Neurologic Disorder Sister      Hypertension Sister      Hyperlipidemia Sister         treated w statins     Depression Sister      Anxiety Disorder Sister      Obesity Sister      Psychotic Disorder Brother         x2     Depression Brother         major depressive disorder and OCD     Anxiety Disorder Brother      Mental Illness Brother         not sure of diagnoses, treated     Hypertension Brother      Hyperlipidemia Brother      Psychotic Disorder Maternal Grandmother         ?     Coronary Artery Disease Maternal Grandmother          of heart attack age 84?     Depression Maternal Grandmother      Psychotic Disorder Maternal Grandfather         ?     Psychotic Disorder Paternal Grandmother         Schizophernia     Coronary Artery Disease Paternal Grandmother          of heart attack, age 85?     Depression Paternal  Grandmother         severe, but recovered     Mental Illness Paternal Grandmother         possible bipolar or other     Psychotic Disorder Paternal Grandfather         ?     Respiratory Paternal Grandfather          of emphysema and smoking     Psychotic Disorder Sister      Other - See Comments Sister         small kidney stone      Hypertension Sister      Hyperlipidemia Sister         treated w statins     Depression Sister      Anxiety Disorder Sister      Cancer - colorectal No family hx of      Glaucoma No family hx of      Macular Degeneration No family hx of        Current Outpatient Medications   Medication     Acetaminophen (TYLENOL PO)     atorvastatin (LIPITOR) 80 MG tablet     B Complex Vitamins (VITAMIN B-COMPLEX PO)     blood glucose (NO BRAND SPECIFIED) lancets standard     blood glucose monitoring (NO BRAND SPECIFIED) meter device kit     blood glucose monitoring (NO BRAND SPECIFIED) test strip     CALCIUM-MAGNESIUM-VITAMIN D PO     Cholecalciferol (VITAMIN D) 1000 UNIT capsule     DiphenhydrAMINE HCl (BENADRYL PO)     EPINEPHrine (EPIPEN/ADRENACLICK/OR ANY BX GENERIC EQUIV) 0.3 MG/0.3ML injection 2-pack     escitalopram (LEXAPRO) 20 MG tablet     fluticasone (FLONASE) 50 MCG/ACT spray     lisinopril (PRINIVIL/ZESTRIL) 10 MG tablet     LORazepam (ATIVAN) 1 MG tablet     Magnesium Hydroxide (MAGNESIA PO)     metFORMIN (GLUCOPHAGE-XR) 500 MG 24 hr tablet     Multiple Vitamins-Minerals (MULTIVITAMIN ADULT PO)     nitroFURantoin, macrocrystal-monohydrate, (MACROBID) 100 MG capsule     order for DME     Probiotic Product (PRO-BIOTIC BLEND PO)     rivaroxaban ANTICOAGULANT (XARELTO) 20 MG TABS tablet     traZODone (DESYREL) 50 MG tablet     No current facility-administered medications for this visit.           Allergies   Allergen Reactions     Paclitaxel Anaphylaxis and Difficulty breathing     Wasps [Hornets] Anaphylaxis     Yellow Hornet Venom [Hornet Venom] Anaphylaxis and Swelling     Yellow  Jacket Venom [Venomil Honey Bee] Anaphylaxis and Swelling     No Clinical Screening - See Comments      Taxol      Wasp Venom Protein Swelling     mental disorientation, hives, yellow jackets, yellow hornets (per pt been through desensitization treatment)     Sulfa Drugs Hives and Rash       Review of Systems  General: + fatigue. Denies changes in weight, weakness, appetite changes, night sweats, hot flashes, fever, chills, or difficulty sleeping  HEENT: Denies headaches, hair loss, spots or floaters, diplopia, masses, head injury, tinnitus, hearing loss, epistaxis, congestion, problems with teeth or gums, dysphonia, or dysphagia  Pulmonary: Denies cough, sputum, hemoptysis, shortness of breath, dyspnea on exertion, wheezing, or allergies  Cardiovascular: Denies chest pain, fainting, palpitations, murmurs, activity intolerance, edema, or high blood pressure  Gastrointestinal: Denies nausea, vomiting, constipation, diarrhea, abdominal pain, bloating, heartburn, melena, hematochezia, or jaundice  Genitourinary: + UTIs (resolved with prophylaxis). Denies dysuria, urinary urgency or frequency, hematuria, cloudy or malodorous urine, incontinence, flank pain, pelvic pain, vaginal bleeding, vaginal discharge, or vaginal dryness  Sexual Function: Denies pain with intercourse, changes in libido, arousal difficulty, or changes in orgasm  Integumentary: Denies rashes, sores, changing moles, or scarring  Hematologic: Denies swollen lymph nodes, masses, easy bruising, or easy bleeding  Musculoskeletal: + arthralgias. Denies falls, back pain, myalgias, stiffness, muscle weakness or muscle cramps  Neurologic: Denies changes in memory, difficulty with walking, seizures, numbness and tingling, dizziness, or tremors  Psychiatric: Denies anxiety, depression, suicidal thoughts, or difficulty concentrating  Endocrine: Denies polydipsia, polyuria, temperature intolerance, or history of thyroid disease      OBJECTIVE:    Physical  Exam:    /79   Pulse 93   Temp 98.7  F (37.1  C) (Oral)   Wt 95.3 kg (210 lb)   LMP 01/01/2009   SpO2 94%   BMI 36.61 kg/m      CONSTITUTIONAL: Alert non-toxic appearing female in no acute distress  HEAD: Normocephalic, atraumatic  EYES: PERRLA; no scleral icterus  ENT: Oropharynx pink without lesions  NECK: Neck supple without palpable lymphadenopathy  RESPIRATORY: Lungs clear to auscultation, no increased work of breathing noted  CV: Regular rate and rhythm, S1S2, no clicks, murmurs, rubs, or gallops; bilateral lower extremities with trace edema, dorsalis pedis pulses 2+ bilaterally  GASTROINTESTINAL: Normoactive bowel sounds x4 quadrants, abdomen soft, non-distended, and non-tender to palpation without masses or organomegaly  GENITOURINARY: External genitalia and urethral meatus pink without lesions, masses, or excoriation. Vagina pink and smooth without masses or lesions. Vaginal cuff without nodularity, masses, or lesions. Cervix surgically absent. Bimanual exam reveals no masses or fullness. Rectovaginal exam confirms these findings.  LYMPHATIC: Cervical, supraclavicular, and inguinal nodes without lymphadenopathy  MUSCULOSKELETAL: Moves all extremities, no obvious muscle wasting  NEUROLOGIC: No gross deficits, normal gait  SKIN: Appropriate color for race, warm and dry, no rashes or lesions to unclothed skin  PSYCHIATRIC: Pleasant and interactive, affect bright, makes appropriate eye contact, thought process linear      Data:   10    Assessment/Plan:  1) Stage IC2 clear cell ovarian carcinoma: No signs of recurrence. Continue surveillance every three months x2 years (through 10/2019) followed by every six months x3 years (through 10/2022) then annually thereafter with  and pelvic/rectal exam. Continue q6wk port flushes, may consider removal when she would like. Reviewed signs and symptoms  of recurrence including but not limited to bleeding from vagina, bladder, or rectum, bloating,  early satiety, swelling in the lower extremities, abdominal or lower back pain, changes in bowel or bladder patterns, shortness of breath, increased fatigue, unexplained weight loss, and night sweats. Reviewed signs and symptoms for when she should contact the clinic or seek additional care. Patient to contact the clinic with any questions or concerns in the interim.   2) Pulmonary nodules: Stable on imaging 5/2018. Repeat chest CT fall of 2019 for two year stability.  3) Genetic testing: Hafsa is negative for mutations in the AIP, ALK, APC, COSTA, BAP1, BARD1, BLM, BRCA1, BRCA2, BRIP1, BMPR1A, CDH1, CDK4, CDKN1B, CDKN2A, CHEK2, DICER1, EPCAM, FANCC, FH, FLCN, GALNT12, GREM1, HOXB13, MAX, MEN1, MET, MITF, MLH1, MRE11A, MSH2, MSH6, MUTYH, NBN, NF1, NF2, PALB2, PHOX2B, PMS2, POLD1, POLE, POT1, MWRCX5Y, PTCH1, PTEN, RAD50, RAD51C, RAD51D, RB1, RET, SDHA, SDHAF2, SDHB, SDHC, SDHD, SMAD4, SMARCA4, SMARCB1, SMARCE1, STK11, SUFU, TVIR229, TP53, TSC1, TSC2, VHL, and XRCC2 genes. No mutations were found in any of the 67 genes analyzed. High risk breast screening.  4) Labs:  10  5) Health maintenance issues discussed today include to follow up with PCP for co-morbid conditions and non-gynecologic issues. Continue follow up with nephrology.  6) Patient verbalized understanding of and agreement with plan.    MARLON Ogden, FNP-C  Division of Gynecologic Oncology  [facility]  Pager: [phone]

## 2018-12-12 NOTE — LETTER
2018       RE: Hafsa Corona  [address]  [place] [address]     Dear Colleague,    Thank you for referring your patient, Hafsa Corona, to the [facility]. Please see a copy of my visit note below.        Gynecologic Oncology Follow-Up Visit    RE: Hafsa Corona  MRN: [id number]  : 1955  Date of Visit: 2018    CC: Hafsa Corona  is a 63 year old female with a history of stage IC2 clear cell ovarian carcinoma. She completed treatment with surgery and three cycles of chemotherapy on 10/17/17. She presents today for a three month surveillance visit.    HPI: Hafsa comes to the clinic feeling well from a gynecologic perspective. Her frequent UTIs have resolved with prophylactic nitrofurantoin post-intercourse. Denies sexual, pelvic, or vaginal concerns. Fatigue continues to improve, feels this is related to working full time. Chronic issues with arthralgias and difficulty sleeping, denies need for intervention. Up to date on seeing her PCP, breast screening, and influenza vaccine. Reports she is due for colonoscopy but is planning to wait until next year due to needing to take time off work. Having her port flushed every six weeks, would like to wait on having this out. Had chest CT at [facility] 2018 which demonstrated stability of pulmonary nodules, due for two year stability fall of . Denies unintended weight loss,  weakness, changes in vision or hearing, shortness of breath, cough, chest pain, abdominal pain, dyspepsia, nausea, vomiting, constipation, bloating, dysuria, urinary frequency or urgency, pelvic pain, lower back pain, vaginal bleeding, vaginal discharge, or numbness/tingling.     Oncology History:  The patient has had a recent episode of lower abdominal pain in early July.  She was subsequently sent to the emergency room and was found to have a large 9 cm complex ovarian mass. She was admitted to the hospital for pain control.  7/3/17:   1187  07/07/17: Exploratory laparotomy, total abdominal hysterectomy, left salpingo-oophorectomy, cancer staging including infracolic omentectomy, bilateral pelvic & para-aortic lymphadenectomy, peritoneal biopsies, evacuation of pelvic fluid by Dr. Cole.    FINAL DIAGNOSIS:   A. LEFT OVARY AND FALLOPIAN TUBE, LEFT SALPINGO-OOPHORECTOMY:   - Ovarian clear cell carcinoma   - Background of endometriosis   - Fallopian tube with no significant histologic abnormality.   B. UTERUS, HYSTERECTOMY:   -  Inactive endometrium   -  Myometrium with adenomyosis and leiomyoma.   -  Cervix with squamous metaplasia.   C. PERITONEUM, RIGHT PELVIS, BIOPSY:   - Fibroadipose tissue, negative for malignancy.   D. LYMPH NODES, RIGHT PELVIC, DISSECTION:   - Thirteen benign lymph nodes (0/13).   E. LYMPH NODES, LEFT PELVIC, DISSECTION:   - Seven benign lymph nodes (0/7).   F. PERITONEUM, LEFT PELVIS, BIOPSY:   - Fibroadipose tissue, negative for malignancy.   G. PERITONEUM, BLADDER, BIOPSY:   - Fibroadipose tissue  with acute and chronic inflammation, negative for malignancy.   H. PERITONEUM, POSTERIOR CUL-DE-SAC, BIOPSY:   - Fibroadipose tissue, negative for malignancy.   I. PERITONEUM, LEFT PARACOLIC GUTTER, BIOPSY:   - Fibroadipose tissue, negative for malignancy.   J. LYMPH NODES, LEFT PARA-AORTIC, DISSECTION:   - Five benign lymph nodes (0/5).   K. LYMPH NODES, RIGHT PARA-AORTIC, DISSECTION:   - Two benign lymph nodes (0/2).   L. PERITONEUM, RIGHT PARACOLIC GUTTER, BIOPSY:   - Fibroadipose tissue, negative for malignancy.   M. OMENTUM, OMENTECTOMY:   - Adipose tissue with reactive changes, negative for malignancy.   COMMENT:   The final diagnosis confirms the interpretation provided intraoperatively.   Report Name: Ovary or Fallopian Tube   Status: Submitted   Part(s) Involved:   A: Ovary and fallopian tube, left   Synoptic Report:   CLINICAL   Clinical History:   - Pelvic mass   SPECIMEN   Procedure:   - Left salpingo-oophorectomy   -  Hysterectomy   - Omentectomy   - Peritoneal  biopsies   Lymph Node Sampling:   - Performed   Location:   - Pelvic lymph nodes   - Para-aortic lymph nodes   Specimen Integrity:   - Left ovary   Specimen Integrity of Left Ovary:   - Capsule intact   TUMOR   Primary Tumor Site(s):   - Left ovary   Left Ovary   Tumor Size of Left Ovary: 19 cm   Histologic Type:   - Clear cell carcinoma   Tumor Extent   Ovarian Surface Involvement:   - Absent   Specimen(s)   Extent of Left Ovary:   - Involved   Extent of Left Fallopian Tube:   - Not involved   Extent of Omentum:   - Not involved   Extent of Uterus:   - Not involved   Extent of Peritoneum:   - Not involved   Peritoneal Ascitic Fluid:   - Not performed / unknown   Pleural Fluid:   - Not performed / unknown   LYMPH NODES     Number of Pelvic Lymph Nodes Examined: 20     Number of Pelvic Lymph Nodes Involved: None identified     Number of Para-aortic Lymph Nodes Examined: 7     Number of Para-Aortic Lymph Nodes Involved: None identified   STAGE (PTNM, AJCC 7TH ED.)   Primary Tumor (pT):   - Ovary   Ovarian Primary Tumor (pT):   - pT1a: Tumor limited to 1 ovary; capsule intact, no tumor on ovarian surface. No malignant cells in ascites or peritoneal washings#   Regional Lymph Nodes (pN):   - pN0: No regional lymph node metastasis       07/31/17: Dr Shaffer follow up: Discussed with the patient that given the high risk histology, as well as ruptured mass before surgery, she would be qualified at higher risk of recurrence despite early stage ovarian cancer.  Recommended adjuvant chemotherapy. Plan for carboplatin of AUC of 6 and paclitaxel of 175, m2 for 3 cycles. Referral for genetic counselor and will see her back after those 3 cycles  08/03/17: Call from patient stating she is going for a second opinion and would like chemotherapy rescheduled to week of 8/14/17.      08/16/17: Cycle #1 Carbo/Taxol.  73. Significant paclitaxel reaction.  08/30/17: C1  carboplatin/inpatient paclitaxel desensitization.   09/25/17: C2 carboplatin/paclitaxel- inpatient paclitaxel desensitization.  15.  10/16/17: C3 carboplatin/paclitaxel- switched to docetaxel given her neuropathy.  10.  11/14/17:  8. CT CAP:  IMPRESSION:   1. Post surgical changes of hysterectomy and bilateral  salpingo-oophorectomy. Nodular soft tissue density in the posterior  cul-de-sac along with ill-defined nodular thickening in the left  pelvis, suspicious for residual disease or metastatic deposits.   2. There is a 3 cm mass in the superior pole of the right kidney,  possibly extending into the renal hilum. Represents RCC until proven  otherwise. Consider renal mass protocol CT to assess renal vein  involvement.  3. Stable sub-4 mm pulmonary nodules, continued attention on  follow-up.    11/16/17: CT renal mass protocol  IMPRESSION:  1. Arterially enhancing mass measuring 3.6 x 2.1 x 2.2 cm mass arising  from superior posterior of the right kidney, this is renal cell  carcinoma until proven otherwise.  2. Stable fat-containing umbilical hernia.    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 Margins negative. Three month surveillance plan with [facility].     2/26/18:  14    5/22/18:  11     8/23/18:  11    12/12/18:  10    Past Medical History:   Diagnosis Date     Anxiety state, unspecified     [id number]     Arthritis 2014    vague, gradual, not treated really, knees feel it     Attention deficit disorder without mention of hyperactivity      Cancer (H) 7/2017 and 1/2018    ovarian and kidney cancers, both treated well     Chronic rhinitis      Depressive disorder lifetime    plus Anxiety plus ADD. Escitalipram, therapy, ADD meds maybe     Depressive disorder, not elsewhere classified      Heart disease 1999    tachycardia and high cholesterol, managed     History of blood transfusion 7/2017    while in hospital for ovarian cancer      Hypertension 1999 tho BP was too LOW last week. past 12 years Generally OK     Panic disorder without agoraphobia      Pure hypercholesterolemia     Lipitor     Tachycardia      Tachycardia, unspecified     9/1999-2/2004     Type II or unspecified type diabetes mellitus without mention of complication, not stated as uncontrolled     diagnosed 2004       Past Surgical History:   Procedure Laterality Date     AS REMV KIDNEY,RADICAL Right      BIOPSY  7/2017 and 2/2018    ovarian and kidney cancer biopsies. also 1987 breast benign     BREAST SURGERY  1987    date unsure. benign breast cyst removed     CATARACT IOL, RT/LT Left 05/18/2018     CATARACT IOL, RT/LT Right 07/1318     COLONOSCOPY  2008 and 2013    polyps removed. Next due fall 2018     HYSTERECTOMY TOTAL ABDOMINAL, BILATERAL SALPINGO-OOPHORECTOMY, NODE DISSECTION, COMBINED Left 7/7/2017    Procedure: COMBINED HYSTERECTOMY TOTAL ABDOMINAL, SALPINGO-OOPHORECTOMY, NODE DISSECTION;  Exploratory Laparotomy, Left Salpingo-Oophorectomy, Cancer Staging, Total Hysterectomy, Omentectomy, Evacuation of abdominal fluid, Lymph Node Dissection  Anesthesia Block ;  Surgeon: Serena Cole MD;  Location: [facility] OR     HYSTERECTOMY, PAP NO LONGER INDICATED  07/07/2017    Laparotomy; for ovarian cancer staging     HYSTERECTOMY, PAP NO LONGER INDICATED       INSERT PORT VASCULAR ACCESS Right 8/21/2017    Procedure: INSERT PORT VASCULAR ACCESS;  Single Lumen Chest Power Port;  Surgeon: Stephen Mike PA-C;  Location: [facility] OR     LYMPHADENECTOMY RETROPERITONEAL Bilateral 07/07/2017    Laparotomy; pelvics & para-aortics; for ovarian cancer staging     OMENTECTOMY  07/07/2017    Laparotomy; for ovarian cancer staging     ORTHOPEDIC SURGERY  1/2002    broken left jaw due to fall, 4 fractures, titanium plates     PHACOEMULSIFICATION CLEAR CORNEA WITH STANDARD INTRAOCULAR LENS IMPLANT Right 7/13/2018    Procedure: PHACOEMULSIFICATION CLEAR CORNEA WITH STANDARD INTRAOCULAR  LENS IMPLANT;  RIght Eye Phacoemulsification Clear Cornea with Standard Intraocular Lens Placement ;  Surgeon: Mary Jo Massey MD;  Location: [facility] OR     PHACOEMULSIFICATION CLEAR CORNEA WITH TORIC INTRAOCULAR LENS IMPLANT Left 5/18/2018    Procedure: PHACOEMULSIFICATION CLEAR CORNEA WITH TORIC INTRAOCULAR LENS IMPLANT;  Left Eye Phacoemulsification with Toric Lens;  Surgeon: Mary Jo Massey MD;  Location: [facility] OR     SALPINGO OOPHORECTOMY,R/L/KAYY Right 2008    Salpingo Oophorectomy, RT     SALPINGO OOPHORECTOMY,R/L/KAYY Left 07/07/2017    Laparotomy; for ovarian cancer staging     SURGICAL HISTORY OF -       facial surgery d/t fall in 1/2002     SURGICAL HISTORY OF -       1985 removal of breast cyst     SURGICAL HISTORY OF -   2008    endometrial ablation     YAG CAPSULOTOMY OD (RIGHT EYE)  10/22/2018       Social History     Socioeconomic History     Marital status: Single     Spouse name: Not on file     Number of children: 0     Years of education: Not on file     Highest education level: Not on file   Social Needs     Financial resource strain: Not on file     Food insecurity - worry: Not on file     Food insecurity - inability: Not on file     Transportation needs - medical: Not on file     Transportation needs - non-medical: Not on file   Occupational History     Employer: [employer]   Tobacco Use     Smoking status: Never Smoker     Smokeless tobacco: Never Used   Substance and Sexual Activity     Alcohol use: Yes     Comment: Very infrequent, a bit of wine or beer 2x per month maybe     Drug use: Yes     Types: Marijuana     Comment: infrequent, for celebrations only, maybe 8x per year     Sexual activity: Yes     Partners: Male     Birth control/protection: None     Comment: long-time    Other Topics Concern     Parent/sibling w/ CABG, MI or angioplasty before 65F 55M? No   Social History Narrative     Not on file       Family History   Problem Relation Age of Onset     C.A.D. Father       Diabetes Father         Later in his life, in his 70s     Hypertension Father      Cerebrovascular Disease Father         1984 or      Psychotic Disorder Father      Pancreatic Cancer Father      Coronary Artery Disease Father         diagnosed in his late 50s (age)     Hyperlipidemia Father         treated w statins     Other Cancer Father         pancreatic, ,  of this     Depression Father      Mental Illness Father         likely PTSD and depression     Obesity Father      C.A.D. Mother      Hypertension Mother      Breast Cancer Mother         2 times,  and ?     Psychotic Disorder Mother      Colon Cancer Mother         ?     Cancer Mother         Bone cancer     Coronary Artery Disease Mother         diagnosed in her late 70s (age)     Hyperlipidemia Mother         treated w. statins     Other Cancer Mother         bone/marrow, ,  of this     Depression Mother      Anxiety Disorder Mother      Mental Illness Mother         various undiagnosed types, but THERE     Obesity Mother      Prostate Problems Brother         cleared      Hypertension Brother      Hyperlipidemia Brother         unsure if treated w statins     Depression Brother      Anxiety Disorder Brother      Mental Illness Brother         undiagnosed but THERE     Obesity Brother      Psychotic Disorder Sister         x2     Neurologic Disorder Sister      Hypertension Sister      Hyperlipidemia Sister         treated w statins     Depression Sister      Anxiety Disorder Sister      Obesity Sister      Psychotic Disorder Brother         x2     Depression Brother         major depressive disorder and OCD     Anxiety Disorder Brother      Mental Illness Brother         not sure of diagnoses, treated     Hypertension Brother      Hyperlipidemia Brother      Psychotic Disorder Maternal Grandmother         ?     Coronary Artery Disease Maternal Grandmother          of heart attack age 84?     Depression  Maternal Grandmother      Psychotic Disorder Maternal Grandfather         ?     Psychotic Disorder Paternal Grandmother         Schizophernia     Coronary Artery Disease Paternal Grandmother          of heart attack, age 85?     Depression Paternal Grandmother         severe, but recovered     Mental Illness Paternal Grandmother         possible bipolar or other     Psychotic Disorder Paternal Grandfather         ?     Respiratory Paternal Grandfather          of emphysema and smoking     Psychotic Disorder Sister      Other - See Comments Sister         small kidney stone      Hypertension Sister      Hyperlipidemia Sister         treated w statins     Depression Sister      Anxiety Disorder Sister      Cancer - colorectal No family hx of      Glaucoma No family hx of      Macular Degeneration No family hx of        Current Outpatient Medications   Medication     Acetaminophen (TYLENOL PO)     atorvastatin (LIPITOR) 80 MG tablet     B Complex Vitamins (VITAMIN B-COMPLEX PO)     blood glucose (NO BRAND SPECIFIED) lancets standard     blood glucose monitoring (NO BRAND SPECIFIED) meter device kit     blood glucose monitoring (NO BRAND SPECIFIED) test strip     CALCIUM-MAGNESIUM-VITAMIN D PO     Cholecalciferol (VITAMIN D) 1000 UNIT capsule     DiphenhydrAMINE HCl (BENADRYL PO)     EPINEPHrine (EPIPEN/ADRENACLICK/OR ANY BX GENERIC EQUIV) 0.3 MG/0.3ML injection 2-pack     escitalopram (LEXAPRO) 20 MG tablet     fluticasone (FLONASE) 50 MCG/ACT spray     lisinopril (PRINIVIL/ZESTRIL) 10 MG tablet     LORazepam (ATIVAN) 1 MG tablet     Magnesium Hydroxide (MAGNESIA PO)     metFORMIN (GLUCOPHAGE-XR) 500 MG 24 hr tablet     Multiple Vitamins-Minerals (MULTIVITAMIN ADULT PO)     nitroFURantoin, macrocrystal-monohydrate, (MACROBID) 100 MG capsule     order for DME     Probiotic Product (PRO-BIOTIC BLEND PO)     rivaroxaban ANTICOAGULANT (XARELTO) 20 MG TABS tablet     traZODone (DESYREL) 50 MG tablet     No current  facility-administered medications for this visit.           Allergies   Allergen Reactions     Paclitaxel Anaphylaxis and Difficulty breathing     Wasps [Hornets] Anaphylaxis     Yellow Hornet Venom [Hornet Venom] Anaphylaxis and Swelling     Yellow Jacket Venom [Venomil Honey Bee] Anaphylaxis and Swelling     No Clinical Screening - See Comments      Taxol      Wasp Venom Protein Swelling     mental disorientation, hives, yellow jackets, yellow hornets (per pt been through desensitization treatment)     Sulfa Drugs Hives and Rash       Review of Systems  General: + fatigue. Denies changes in weight, weakness, appetite changes, night sweats, hot flashes, fever, chills, or difficulty sleeping  HEENT: Denies headaches, hair loss, spots or floaters, diplopia, masses, head injury, tinnitus, hearing loss, epistaxis, congestion, problems with teeth or gums, dysphonia, or dysphagia  Pulmonary: Denies cough, sputum, hemoptysis, shortness of breath, dyspnea on exertion, wheezing, or allergies  Cardiovascular: Denies chest pain, fainting, palpitations, murmurs, activity intolerance, edema, or high blood pressure  Gastrointestinal: Denies nausea, vomiting, constipation, diarrhea, abdominal pain, bloating, heartburn, melena, hematochezia, or jaundice  Genitourinary: + UTIs (resolved with prophylaxis). Denies dysuria, urinary urgency or frequency, hematuria, cloudy or malodorous urine, incontinence, flank pain, pelvic pain, vaginal bleeding, vaginal discharge, or vaginal dryness  Sexual Function: Denies pain with intercourse, changes in libido, arousal difficulty, or changes in orgasm  Integumentary: Denies rashes, sores, changing moles, or scarring  Hematologic: Denies swollen lymph nodes, masses, easy bruising, or easy bleeding  Musculoskeletal: + arthralgias. Denies falls, back pain, myalgias, stiffness, muscle weakness or muscle cramps  Neurologic: Denies changes in memory, difficulty with walking, seizures, numbness and  tingling, dizziness, or tremors  Psychiatric: Denies anxiety, depression, suicidal thoughts, or difficulty concentrating  Endocrine: Denies polydipsia, polyuria, temperature intolerance, or history of thyroid disease      OBJECTIVE:    Physical Exam:    /79   Pulse 93   Temp 98.7  F (37.1  C) (Oral)   Wt 95.3 kg (210 lb)   LMP 01/01/2009   SpO2 94%   BMI 36.61 kg/m       CONSTITUTIONAL: Alert non-toxic appearing female in no acute distress  HEAD: Normocephalic, atraumatic  EYES: PERRLA; no scleral icterus  ENT: Oropharynx pink without lesions  NECK: Neck supple without palpable lymphadenopathy  RESPIRATORY: Lungs clear to auscultation, no increased work of breathing noted  CV: Regular rate and rhythm, S1S2, no clicks, murmurs, rubs, or gallops; bilateral lower extremities with trace edema, dorsalis pedis pulses 2+ bilaterally  GASTROINTESTINAL: Normoactive bowel sounds x4 quadrants, abdomen soft, non-distended, and non-tender to palpation without masses or organomegaly  GENITOURINARY: External genitalia and urethral meatus pink without lesions, masses, or excoriation. Vagina pink and smooth without masses or lesions. Vaginal cuff without nodularity, masses, or lesions. Cervix surgically absent. Bimanual exam reveals no masses or fullness. Rectovaginal exam confirms these findings.  LYMPHATIC: Cervical, supraclavicular, and inguinal nodes without lymphadenopathy  MUSCULOSKELETAL: Moves all extremities, no obvious muscle wasting  NEUROLOGIC: No gross deficits, normal gait  SKIN: Appropriate color for race, warm and dry, no rashes or lesions to unclothed skin  PSYCHIATRIC: Pleasant and interactive, affect bright, makes appropriate eye contact, thought process linear      Data:   10    Assessment/Plan:  1) Stage IC2 clear cell ovarian carcinoma: No signs of recurrence. Continue surveillance every three months x2 years (through 10/2019) followed by every six months x3 years (through 10/2022) then  annually thereafter with  and pelvic/rectal exam. Continue q6wk port flushes, may consider removal when she would like. Reviewed signs and symptoms  of recurrence including but not limited to bleeding from vagina, bladder, or rectum, bloating, early satiety, swelling in the lower extremities, abdominal or lower back pain, changes in bowel or bladder patterns, shortness of breath, increased fatigue, unexplained weight loss, and night sweats. Reviewed signs and symptoms for when she should contact the clinic or seek additional care. Patient to contact the clinic with any questions or concerns in the interim.   2) Pulmonary nodules: Stable on imaging 5/2018. Repeat chest CT fall of 2019 for two year stability.  3) Genetic testing: Hafsa is negative for mutations in the AIP, ALK, APC, COSTA, BAP1, BARD1, BLM, BRCA1, BRCA2, BRIP1, BMPR1A, CDH1, CDK4, CDKN1B, CDKN2A, CHEK2, DICER1, EPCAM, FANCC, FH, FLCN, GALNT12, GREM1, HOXB13, MAX, MEN1, MET, MITF, MLH1, MRE11A, MSH2, MSH6, MUTYH, NBN, NF1, NF2, PALB2, PHOX2B, PMS2, POLD1, POLE, POT1, RFISM8P, PTCH1, PTEN, RAD50, RAD51C, RAD51D, RB1, RET, SDHA, SDHAF2, SDHB, SDHC, SDHD, SMAD4, SMARCA4, SMARCB1, SMARCE1, STK11, SUFU, YPRK852, TP53, TSC1, TSC2, VHL, and XRCC2 genes. No mutations were found in any of the 67 genes analyzed. High risk breast screening.  4) Labs:  10  5) Health maintenance issues discussed today include to follow up with PCP for co-morbid conditions and non-gynecologic issues. Continue follow up with nephrology.  6) Patient verbalized understanding of and agreement with plan.    MARLON Ogden, FNP-C  Division of Gynecologic Oncology  [facility]  Pager: [phone]

## 2018-12-28 PROCEDURE — 96523 IRRIG DRUG DELIVERY DEVICE: CPT

## 2018-12-28 PROCEDURE — 25000128 H RX IP 250 OP 636: Performed by: INTERNAL MEDICINE

## 2018-12-28 RX ORDER — HEPARIN SODIUM (PORCINE) LOCK FLUSH IV SOLN 100 UNIT/ML 100 UNIT/ML
5 SOLUTION INTRAVENOUS EVERY 8 HOURS
Status: DISCONTINUED | OUTPATIENT
Start: 2018-12-28 | End: 2018-12-28 | Stop reason: HOSPADM

## 2018-12-28 RX ADMIN — Medication 5 ML: at 10:13

## 2019-01-08 ENCOUNTER — MYC MEDICAL ADVICE (OUTPATIENT)
Dept: FAMILY MEDICINE | Facility: CLINIC | Age: 64
End: 2019-01-08

## 2019-01-09 DIAGNOSIS — F33.0 MAJOR DEPRESSIVE DISORDER, RECURRENT EPISODE, MILD (H): ICD-10-CM

## 2019-01-09 RX ORDER — ESCITALOPRAM OXALATE 20 MG/1
20 TABLET ORAL DAILY
Qty: 30 TABLET | Status: SHIPPED | OUTPATIENT
Start: 2019-01-09 | End: 2019-01-10

## 2019-01-09 NOTE — TELEPHONE ENCOUNTER
Left message to call back and ask to speak with an available triage nurse.  CHARLENE Aden, TIMURN, RN

## 2019-01-09 NOTE — TELEPHONE ENCOUNTER
Would you be willing to write a new rx for escitalopram 20mg once daily  Pt has been stable on this med for quite awhile  She has financial issues d/t new insurance and would like to only see her psych np once a year- d/t that copay and now her copays for meds are more.   Last office visit 10/23/18    Ok to send this in? She will be out on Friday.     Thanks!     Renate Cruz RN

## 2019-01-10 RX ORDER — ESCITALOPRAM OXALATE 20 MG/1
20 TABLET ORAL DAILY
Qty: 90 TABLET | Refills: 3 | Status: SHIPPED | OUTPATIENT
Start: 2019-01-10 | End: 2019-10-01

## 2019-01-10 NOTE — TELEPHONE ENCOUNTER
"Reordered escitalopram as pharmacies often do not recognize \"PRN\"  Reordered #90 with 3 refills  Thanks! Kristla Capps RN    "

## 2019-01-29 ENCOUNTER — OFFICE VISIT (OUTPATIENT)
Dept: FAMILY MEDICINE | Facility: CLINIC | Age: 64
End: 2019-01-29
Payer: COMMERCIAL

## 2019-01-29 VITALS
TEMPERATURE: 98.1 F | DIASTOLIC BLOOD PRESSURE: 66 MMHG | BODY MASS INDEX: 37.48 KG/M2 | RESPIRATION RATE: 16 BRPM | OXYGEN SATURATION: 96 % | SYSTOLIC BLOOD PRESSURE: 122 MMHG | HEART RATE: 88 BPM | WEIGHT: 215 LBS

## 2019-01-29 DIAGNOSIS — Z90.5 SOLITARY KIDNEY, ACQUIRED: ICD-10-CM

## 2019-01-29 DIAGNOSIS — R30.0 DYSURIA: Primary | ICD-10-CM

## 2019-01-29 LAB
ALBUMIN UR-MCNC: NEGATIVE MG/DL
APPEARANCE UR: CLEAR
BILIRUB UR QL STRIP: NEGATIVE
COLOR UR AUTO: YELLOW
GLUCOSE UR STRIP-MCNC: NEGATIVE MG/DL
HGB UR QL STRIP: NEGATIVE
KETONES UR STRIP-MCNC: ABNORMAL MG/DL
LEUKOCYTE ESTERASE UR QL STRIP: NEGATIVE
NITRATE UR QL: NEGATIVE
PH UR STRIP: 6.5 PH (ref 5–7)
SOURCE: ABNORMAL
SP GR UR STRIP: 1.02 (ref 1–1.03)
UROBILINOGEN UR STRIP-ACNC: 0.2 EU/DL (ref 0.2–1)

## 2019-01-29 PROCEDURE — 36415 COLL VENOUS BLD VENIPUNCTURE: CPT | Performed by: FAMILY MEDICINE

## 2019-01-29 PROCEDURE — 80048 BASIC METABOLIC PNL TOTAL CA: CPT | Performed by: FAMILY MEDICINE

## 2019-01-29 PROCEDURE — 99214 OFFICE O/P EST MOD 30 MIN: CPT | Performed by: FAMILY MEDICINE

## 2019-01-29 PROCEDURE — 81003 URINALYSIS AUTO W/O SCOPE: CPT | Performed by: FAMILY MEDICINE

## 2019-01-29 RX ORDER — NITROFURANTOIN 25; 75 MG/1; MG/1
CAPSULE ORAL
Refills: 0 | COMMUNITY
Start: 2018-10-23 | End: 2020-01-13

## 2019-01-29 ASSESSMENT — PAIN SCALES - GENERAL: PAINLEVEL: NO PAIN (1)

## 2019-01-30 LAB
ANION GAP SERPL CALCULATED.3IONS-SCNC: 7 MMOL/L (ref 3–14)
BUN SERPL-MCNC: 25 MG/DL (ref 7–30)
CALCIUM SERPL-MCNC: 9.1 MG/DL (ref 8.5–10.1)
CHLORIDE SERPL-SCNC: 107 MMOL/L (ref 94–109)
CO2 SERPL-SCNC: 26 MMOL/L (ref 20–32)
CREAT SERPL-MCNC: 1.05 MG/DL (ref 0.52–1.04)
GFR SERPL CREATININE-BSD FRML MDRD: 56 ML/MIN/{1.73_M2}
GLUCOSE SERPL-MCNC: 100 MG/DL (ref 70–99)
POTASSIUM SERPL-SCNC: 4.2 MMOL/L (ref 3.4–5.3)
SODIUM SERPL-SCNC: 140 MMOL/L (ref 133–144)

## 2019-01-30 NOTE — PROGRESS NOTES
SUBJECTIVE:   Hafsa Corona is a 63 year old female who presents to clinic today for the following health issues:    HPI     Has had some irritation at urethra with some bleeding noticed there.  Wears bladder pads always.  Has had dysuria only at urethral site.  No frequency.  Had not been drinking fluids well in past week.    Recheck of kidney function- last checked 7-2018.  Hx of renal tumor initially thought to be CA-- had total nephrectomy for diagnosis and treatment.  Now with one kidney.  Desires recheck of BMP today for surveillance of function.    Problem list and histories reviewed & adjusted, as indicated.  Additional history: as documented        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)     Pain in joint, multiple sites     ACP (advance care planning)     Renal cell carcinoma, right (H)     Solitary kidney, acquired     Encounter for follow-up surveillance of ovarian cancer     Personal history of DVT (deep vein thrombosis)     Abscess of left lower extremity     ADD (attention deficit disorder) without hyperactivity     Angiomyolipoma of kidney     Anxiety     Coronary atherosclerosis     Depressive disorder     Dyslipidemia     Dysthymic disorder     Generalized anxiety disorder     Heel pain     Lymphedema     Mild anemia     Normocytic anemia     Renal oncocytoma     Panic disorder with agoraphobia     Past  Surgical History:   Procedure Laterality Date     AS REMV KIDNEY,RADICAL Right      BIOPSY  7/2017 and 2/2018    ovarian and kidney cancer biopsies. also 1987 breast benign     BREAST SURGERY  1987    date unsure. benign breast cyst removed     CATARACT IOL, RT/LT Left 05/18/2018     CATARACT IOL, RT/LT Right 07/1318     COLONOSCOPY  2008 and 2013    polyps removed. Next due fall 2018     HYSTERECTOMY TOTAL ABDOMINAL, BILATERAL SALPINGO-OOPHORECTOMY, NODE DISSECTION, COMBINED Left 7/7/2017    Procedure: COMBINED HYSTERECTOMY TOTAL ABDOMINAL, SALPINGO-OOPHORECTOMY, NODE DISSECTION;  Exploratory Laparotomy, Left Salpingo-Oophorectomy, Cancer Staging, Total Hysterectomy, Omentectomy, Evacuation of abdominal fluid, Lymph Node Dissection  Anesthesia Block ;  Surgeon: Serena Cole MD;  Location: [facility] OR     HYSTERECTOMY, PAP NO LONGER INDICATED  07/07/2017    Laparotomy; for ovarian cancer staging     HYSTERECTOMY, PAP NO LONGER INDICATED       INSERT PORT VASCULAR ACCESS Right 8/21/2017    Procedure: INSERT PORT VASCULAR ACCESS;  Single Lumen Chest Power Port;  Surgeon: Stephen Mike PA-C;  Location: [facility] OR     LYMPHADENECTOMY RETROPERITONEAL Bilateral 07/07/2017    Laparotomy; pelvics & para-aortics; for ovarian cancer staging     OMENTECTOMY  07/07/2017    Laparotomy; for ovarian cancer staging     ORTHOPEDIC SURGERY  1/2002    broken left jaw due to fall, 4 fractures, titanium plates     PHACOEMULSIFICATION CLEAR CORNEA WITH STANDARD INTRAOCULAR LENS IMPLANT Right 7/13/2018    Procedure: PHACOEMULSIFICATION CLEAR CORNEA WITH STANDARD INTRAOCULAR LENS IMPLANT;  RIght Eye Phacoemulsification Clear Cornea with Standard Intraocular Lens Placement ;  Surgeon: Mary Jo Massey MD;  Location: [facility] OR     PHACOEMULSIFICATION CLEAR CORNEA WITH TORIC INTRAOCULAR LENS IMPLANT Left 5/18/2018    Procedure: PHACOEMULSIFICATION CLEAR CORNEA WITH TORIC INTRAOCULAR LENS IMPLANT;  Left Eye Phacoemulsification with Toric  Lens;  Surgeon: Mary Jo Massey MD;  Location: [facility] OR     SALPINGO OOPHORECTOMY,R/L/KAYY Right     Salpingo Oophorectomy, RT     SALPINGO OOPHORECTOMY,R/L/KAYY Left 2017    Laparotomy; for ovarian cancer staging     SURGICAL HISTORY OF -       facial surgery d/t fall in 2002     SURGICAL HISTORY OF -        removal of breast cyst     SURGICAL HISTORY OF -       endometrial ablation     YAG CAPSULOTOMY OD (RIGHT EYE)  10/22/2018       Social History     Tobacco Use     Smoking status: Never Smoker     Smokeless tobacco: Never Used   Substance Use Topics     Alcohol use: Yes     Comment: Very infrequent, a bit of wine or beer 2x per month maybe     Family History   Problem Relation Age of Onset     C.A.D. Father      Diabetes Father         Later in his life, in his 70s     Hypertension Father      Cerebrovascular Disease Father         1984 or      Psychotic Disorder Father      Pancreatic Cancer Father      Coronary Artery Disease Father         diagnosed in his late 50s (age)     Hyperlipidemia Father         treated w statins     Other Cancer Father         pancreatic, ,  of this     Depression Father      Mental Illness Father         likely PTSD and depression     Obesity Father      C.A.D. Mother      Hypertension Mother      Breast Cancer Mother         2 times,  and ?     Psychotic Disorder Mother      Colon Cancer Mother         ?     Cancer Mother         Bone cancer     Coronary Artery Disease Mother         diagnosed in her late 70s (age)     Hyperlipidemia Mother         treated w. statins     Other Cancer Mother         bone/marrow, ,  of this     Depression Mother      Anxiety Disorder Mother      Mental Illness Mother         various undiagnosed types, but THERE     Obesity Mother      Prostate Problems Brother         cleared      Hypertension Brother      Hyperlipidemia Brother         unsure if treated w statins     Depression Brother       Anxiety Disorder Brother      Mental Illness Brother         undiagnosed but THERE     Obesity Brother      Psychotic Disorder Sister         x2     Neurologic Disorder Sister      Hypertension Sister      Hyperlipidemia Sister         treated w statins     Depression Sister      Anxiety Disorder Sister      Obesity Sister      Psychotic Disorder Brother         x2     Depression Brother         major depressive disorder and OCD     Anxiety Disorder Brother      Mental Illness Brother         not sure of diagnoses, treated     Hypertension Brother      Hyperlipidemia Brother      Psychotic Disorder Maternal Grandmother         ?     Coronary Artery Disease Maternal Grandmother          of heart attack age 84?     Depression Maternal Grandmother      Psychotic Disorder Maternal Grandfather         ?     Psychotic Disorder Paternal Grandmother         Schizophernia     Coronary Artery Disease Paternal Grandmother          of heart attack, age 85?     Depression Paternal Grandmother         severe, but recovered     Mental Illness Paternal Grandmother         possible bipolar or other     Psychotic Disorder Paternal Grandfather         ?     Respiratory Paternal Grandfather          of emphysema and smoking     Psychotic Disorder Sister      Other - See Comments Sister         small kidney stone      Hypertension Sister      Hyperlipidemia Sister         treated w statins     Depression Sister      Anxiety Disorder Sister      Cancer - colorectal No family hx of      Glaucoma No family hx of      Macular Degeneration No family hx of          Current Outpatient Medications   Medication Sig Dispense Refill     Acetaminophen (TYLENOL PO) Take 500 mg by mouth 2 tabs prn pain (monthly)       atorvastatin (LIPITOR) 80 MG tablet TAKE 1 TABLET(80 MG) BY MOUTH DAILY 90 tablet PRN     B Complex Vitamins (VITAMIN B-COMPLEX PO) Take 50 mg by mouth        blood glucose (NO BRAND SPECIFIED) lancets standard Use to test  blood sugar 2 times daily or as directed. 100 each 11     blood glucose monitoring (NO BRAND SPECIFIED) meter device kit Use to test blood sugar 2 times daily or as directed. 1 kit 0     blood glucose monitoring (NO BRAND SPECIFIED) test strip Use to test blood sugars 2 times daily or as directed 100 strip PRN     CALCIUM-MAGNESIUM-VITAMIN D PO Take 2 tablets by mouth daily       Cholecalciferol (VITAMIN D) 1000 UNIT capsule Take 2,000 Units by mouth daily        DiphenhydrAMINE HCl (BENADRYL PO) Take 50 mg by mouth daily as needed (monthly)       EPINEPHrine (EPIPEN/ADRENACLICK/OR ANY BX GENERIC EQUIV) 0.3 MG/0.3ML injection 2-pack Inject 0.3 mLs (0.3 mg) into the muscle once as needed for anaphylaxis 0.3 mL PRN     escitalopram (LEXAPRO) 20 MG tablet Take 1 tablet (20 mg) by mouth daily 90 tablet 3     fluticasone (FLONASE) 50 MCG/ACT spray SHAKE WELL AND USE 2 SPRAYS IN EACH NOSTRIL DAILY 48 g 3     lisdexamfetamine (VYVANSE) 50 MG capsule Take 50 mg by mouth       lisinopril (PRINIVIL/ZESTRIL) 10 MG tablet Take 1 tablet (10 mg) by mouth daily 90 tablet PRN     LORazepam (ATIVAN) 1 MG tablet Take 1 tablet (1 mg) by mouth every 6 hours as needed (nausea/vomiting, anxiety or sleep) 30 tablet 1     Magnesium Hydroxide (MAGNESIA PO) Take 500 mg by mouth       metFORMIN (GLUCOPHAGE-XR) 500 MG 24 hr tablet TAKE 2 TABLETS BY MOUTH ONCE DAILY WITH EVENING MEAL 180 tablet PRN     Multiple Vitamins-Minerals (MULTIVITAMIN ADULT PO) Take 1 tablet by mouth daily       order for DME Home blood pressure monitor 1 Units 0     Probiotic Product (PRO-BIOTIC BLEND PO) Take 1 capsule by mouth daily Enzymatic Therapy-probiotic pearls       rivaroxaban ANTICOAGULANT (XARELTO) 20 MG TABS tablet Take 1 tablet (20 mg) by mouth daily (with dinner) 30 tablet 11     traZODone (DESYREL) 50 MG tablet TAKE 1 TO 2 TABLETS(50  MG) BY MOUTH EVERY NIGHT AS NEEDED FOR SLEEP 180 tablet 1     nitroFURantoin macrocrystal-monohydrate (MACROBID)  100 MG capsule TK 1 C PO AFTER INTERCOUSE D PRN  0     Allergies   Allergen Reactions     Paclitaxel Anaphylaxis and Difficulty breathing     Wasps [Hornets] Anaphylaxis     Yellow Hornet Venom [Hornet Venom] Anaphylaxis and Swelling     Yellow Jacket Venom [Venomil Honey Bee] Anaphylaxis and Swelling     No Clinical Screening - See Comments      Taxol      Wasp Venom Protein Swelling     mental disorientation, hives, yellow jackets, yellow hornets (per pt been through desensitization treatment)     Sulfa Drugs Hives and Rash     Recent Labs   Lab Test 07/09/18  1351 05/10/18  1558  11/14/17  0949 11/01/17  1457 10/16/17  0824  08/31/17  0749  04/11/17  1125 09/06/16  1131 02/04/16  1255   A1C  --  5.6  --   --   --   --   --  6.1*  --  6.0 6.2* 6.0   LDL  --   --   --   --  70  --   --   --   --   --  78 189*   HDL  --   --   --   --  54  --   --   --   --   --  59 61   TRIG  --   --   --   --  121  --   --   --   --   --  159* 133   ALT  --  21  --  16  --  23   < >  --    < >  --   --  16   CR 1.19* 1.16*   < > 0.71  --  0.67   < >  --    < > 0.75  --  0.76   GFRESTIMATED 46* 47*   < > 83  --  89   < >  --    < > 78  --  77   GFRESTBLACK 55* 57*   < > >90  --  >90   < >  --    < > >90   GFR Calc    --  >90   GFR Calc     POTASSIUM 4.2 4.4   < > 4.1  --  4.1   < >  --    < > 4.1  --  4.2   TSH  --   --   --   --   --   --   --   --   --   --   --  1.86    < > = values in this interval not displayed.      BP Readings from Last 3 Encounters:   01/29/19 122/66   12/12/18 115/79   10/23/18 102/71    Wt Readings from Last 3 Encounters:   01/29/19 97.5 kg (215 lb)   12/12/18 95.3 kg (210 lb)   10/23/18 92.8 kg (204 lb 8 oz)                    ROS:  Constitutional, HEENT, cardiovascular, pulmonary, gi and gu systems are negative, except as otherwise noted.    OBJECTIVE:     /66   Pulse 88   Temp 98.1  F (36.7  C) (Oral)   Resp 16   Wt 97.5 kg (215 lb)   LMP 01/01/2009   SpO2  96%   Breastfeeding? No   BMI 37.48 kg/m    Body mass index is 37.48 kg/m .  GENERAL: healthy, alert and no distress  EYES: Eyes grossly normal to inspection, PERRL and conjunctivae and sclerae normal  NECK: no adenopathy, no asymmetry, masses, or scars and thyroid normal to palpation  MS: no gross musculoskeletal defects noted, no edema  SKIN: no suspicious lesions or rashes  NEURO: Normal strength and tone, mentation intact and speech normal  PSYCH: mentation appears normal, affect normal/bright    Diagnostic Test Results:  Results for orders placed or performed in visit on 01/29/19 (from the past 24 hour(s))   *UA reflex to Microscopic and Culture ([facility] (except [place] and [place])   Result Value Ref Range    Color Urine Yellow     Appearance Urine Clear     Glucose Urine Negative NEG^Negative mg/dL    Bilirubin Urine Negative NEG^Negative    Ketones Urine Trace (A) NEG^Negative mg/dL    Specific Gravity Urine 1.025 1.003 - 1.035    Blood Urine Negative NEG^Negative    pH Urine 6.5 5.0 - 7.0 pH    Protein Albumin Urine Negative NEG^Negative mg/dL    Urobilinogen Urine 0.2 0.2 - 1.0 EU/dL    Nitrite Urine Negative NEG^Negative    Leukocyte Esterase Urine Negative NEG^Negative    Source Midstream Urine        ASSESSMENT/PLAN:     1. Dysuria    - *UA reflex to Microscopic and Culture ([facility] (except [place] and [place])    Reassured UA is negative except for trace ketones.  Recommend increased fluids to better hydrate.  Consider diaper cream around urethral site to treat irritation and provide barrier to bladder pads which can cause irritation.    2. Solitary kidney, acquired    - Basic metabolic panel  (Ca, Cl, CO2, Creat, Gluc, K, Na, BUN)    Recheck of kidney function- last checked 7-2018.  Hx of renal tumor initially thought to be CA-- had total nephrectomy for diagnosis and treatment.  Now with one kidney.  Desires recheck of BMP today for surveillance of  function.    Diana Emmanuel MD  [facility]

## 2019-02-15 ENCOUNTER — HEALTH MAINTENANCE LETTER (OUTPATIENT)
Age: 64
End: 2019-02-15

## 2019-02-23 ENCOUNTER — OFFICE VISIT (OUTPATIENT)
Dept: URGENT CARE | Facility: URGENT CARE | Age: 64
End: 2019-02-23
Payer: COMMERCIAL

## 2019-02-23 VITALS
OXYGEN SATURATION: 97 % | DIASTOLIC BLOOD PRESSURE: 74 MMHG | BODY MASS INDEX: 37.48 KG/M2 | HEART RATE: 88 BPM | TEMPERATURE: 97.8 F | SYSTOLIC BLOOD PRESSURE: 106 MMHG | WEIGHT: 215 LBS

## 2019-02-23 DIAGNOSIS — N39.0 URINARY TRACT INFECTION WITH HEMATURIA, SITE UNSPECIFIED: Primary | ICD-10-CM

## 2019-02-23 DIAGNOSIS — R31.9 URINARY TRACT INFECTION WITH HEMATURIA, SITE UNSPECIFIED: Primary | ICD-10-CM

## 2019-02-23 DIAGNOSIS — R30.0 DYSURIA: ICD-10-CM

## 2019-02-23 LAB
ALBUMIN UR-MCNC: >=300 MG/DL
APPEARANCE UR: ABNORMAL
BILIRUB UR QL STRIP: NEGATIVE
COLOR UR AUTO: YELLOW
GLUCOSE UR STRIP-MCNC: NEGATIVE MG/DL
HGB UR QL STRIP: ABNORMAL
KETONES UR STRIP-MCNC: NEGATIVE MG/DL
LEUKOCYTE ESTERASE UR QL STRIP: NEGATIVE
NITRATE UR QL: NEGATIVE
PH UR STRIP: 6.5 PH (ref 5–7)
RBC #/AREA URNS AUTO: ABNORMAL /HPF
SOURCE: ABNORMAL
SP GR UR STRIP: 1.02 (ref 1–1.03)
UROBILINOGEN UR STRIP-ACNC: 0.2 EU/DL (ref 0.2–1)
WBC #/AREA URNS AUTO: ABNORMAL /HPF

## 2019-02-23 PROCEDURE — 99213 OFFICE O/P EST LOW 20 MIN: CPT | Performed by: FAMILY MEDICINE

## 2019-02-23 PROCEDURE — 87086 URINE CULTURE/COLONY COUNT: CPT | Performed by: FAMILY MEDICINE

## 2019-02-23 PROCEDURE — 81001 URINALYSIS AUTO W/SCOPE: CPT | Performed by: FAMILY MEDICINE

## 2019-02-23 NOTE — PROGRESS NOTES
SUBJECTIVE:   Hafsa Corona is a 63 year old female who  presents today for a possible UTI. Symptoms of dysuria and frequency have been going on for 4day(s).  Hematuria yes.  sudden onset and waxing and waning, has gotten worsen and moderate.  There is no history of fever, chills, nausea or vomiting.  No history of vaginal or penile discharge. This patient does have a history of urinary tract infections.    Patient did take Keflex once after she had bowel incident  Has Macrobid to use after intercourse.    Past Medical History:   Diagnosis Date     Anxiety state, unspecified     [id number]     Arthritis 2014    vague, gradual, not treated really, knees feel it     Attention deficit disorder without mention of hyperactivity      Cancer (H) 7/2017 and 1/2018    ovarian and kidney cancers, both treated well     Chronic rhinitis      Depressive disorder lifetime    plus Anxiety plus ADD. Escitalipram, therapy, ADD meds maybe     Depressive disorder, not elsewhere classified      Heart disease 1999    tachycardia and high cholesterol, managed     History of blood transfusion 7/2017    while in hospital for ovarian cancer     Hypertension 1999    tho BP was too LOW last week. past 12 years Generally OK     Panic disorder without agoraphobia      Pure hypercholesterolemia     Lipitor     Tachycardia      Tachycardia, unspecified     9/1999-2/2004     Type II or unspecified type diabetes mellitus without mention of complication, not stated as uncontrolled     diagnosed 2004     Current Outpatient Medications   Medication Sig Dispense Refill     Acetaminophen (TYLENOL PO) Take 500 mg by mouth 2 tabs prn pain (monthly)       atorvastatin (LIPITOR) 80 MG tablet TAKE 1 TABLET(80 MG) BY MOUTH DAILY 90 tablet PRN     B Complex Vitamins (VITAMIN B-COMPLEX PO) Take 50 mg by mouth        blood glucose (NO BRAND SPECIFIED) lancets standard Use to test blood sugar 2 times daily or as directed. 100 each 11     blood glucose monitoring  (NO BRAND SPECIFIED) meter device kit Use to test blood sugar 2 times daily or as directed. 1 kit 0     blood glucose monitoring (NO BRAND SPECIFIED) test strip Use to test blood sugars 2 times daily or as directed 100 strip PRN     CALCIUM-MAGNESIUM-VITAMIN D PO Take 2 tablets by mouth daily       Cholecalciferol (VITAMIN D) 1000 UNIT capsule Take 2,000 Units by mouth daily        DiphenhydrAMINE HCl (BENADRYL PO) Take 50 mg by mouth daily as needed (monthly)       EPINEPHrine (EPIPEN/ADRENACLICK/OR ANY BX GENERIC EQUIV) 0.3 MG/0.3ML injection 2-pack Inject 0.3 mLs (0.3 mg) into the muscle once as needed for anaphylaxis 0.3 mL PRN     escitalopram (LEXAPRO) 20 MG tablet Take 1 tablet (20 mg) by mouth daily 90 tablet 3     fluticasone (FLONASE) 50 MCG/ACT spray SHAKE WELL AND USE 2 SPRAYS IN EACH NOSTRIL DAILY 48 g 3     lisdexamfetamine (VYVANSE) 50 MG capsule Take 50 mg by mouth       lisinopril (PRINIVIL/ZESTRIL) 10 MG tablet Take 1 tablet (10 mg) by mouth daily 90 tablet PRN     LORazepam (ATIVAN) 1 MG tablet Take 1 tablet (1 mg) by mouth every 6 hours as needed (nausea/vomiting, anxiety or sleep) 30 tablet 1     Magnesium Hydroxide (MAGNESIA PO) Take 500 mg by mouth       metFORMIN (GLUCOPHAGE-XR) 500 MG 24 hr tablet TAKE 2 TABLETS BY MOUTH ONCE DAILY WITH EVENING MEAL 180 tablet PRN     Multiple Vitamins-Minerals (MULTIVITAMIN ADULT PO) Take 1 tablet by mouth daily       nitroFURantoin macrocrystal-monohydrate (MACROBID) 100 MG capsule TK 1 C PO AFTER INTERCOUSE D PRN  0     order for DME Home blood pressure monitor 1 Units 0     Probiotic Product (PRO-BIOTIC BLEND PO) Take 1 capsule by mouth daily Enzymatic Therapy-probiotic pearls       rivaroxaban ANTICOAGULANT (XARELTO) 20 MG TABS tablet Take 1 tablet (20 mg) by mouth daily (with dinner) 30 tablet 11     traZODone (DESYREL) 50 MG tablet TAKE 1 TO 2 TABLETS(50  MG) BY MOUTH EVERY NIGHT AS NEEDED FOR SLEEP 180 tablet 1     Social History     Tobacco  Use     Smoking status: Never Smoker     Smokeless tobacco: Never Used   Substance Use Topics     Alcohol use: Yes     Comment: Very infrequent, a bit of wine or beer 2x per month maybe       ROS:   Review of systems negative except as stated above.    OBJECTIVE:  /74   Pulse 88   Temp 97.8  F (36.6  C) (Tympanic)   Wt 97.5 kg (215 lb)   LMP 01/01/2009   SpO2 97%   BMI 37.48 kg/m    GENERAL APPEARANCE: healthy, alert and no distress  PSYCH: mentation appears normal    Results for orders placed or performed in visit on 02/23/19   *UA reflex to Microscopic and Culture ([facility] (except [place] and [place])   Result Value Ref Range    Color Urine Yellow     Appearance Urine Slightly Cloudy     Glucose Urine Negative NEG^Negative mg/dL    Bilirubin Urine Negative NEG^Negative    Ketones Urine Negative NEG^Negative mg/dL    Specific Gravity Urine 1.020 1.003 - 1.035    Blood Urine Large (A) NEG^Negative    pH Urine 6.5 5.0 - 7.0 pH    Protein Albumin Urine >=300 (A) NEG^Negative mg/dL    Urobilinogen Urine 0.2 0.2 - 1.0 EU/dL    Nitrite Urine Negative NEG^Negative    Leukocyte Esterase Urine Negative NEG^Negative    Source Midstream Urine    Urine Microscopic   Result Value Ref Range    WBC Urine 0 - 5 OTO5^0 - 5 /HPF    RBC Urine  (A) OTO2^O - 2 /HPF   Urine Culture Aerobic Bacterial   Result Value Ref Range    Specimen Description Midstream Urine     Culture Micro No growth        ASSESSMENT/PLAN:   (N39.0,  R31.9) Urinary tract infection with hematuria, site unspecified  (primary encounter diagnosis)  Plan: take macrobid (has already)    (R30.0) Dysuria  Comment: UTI  Plan: *UA reflex to Microscopic and Culture ([place]         [facility] (except [place] and         [place]), Urine Microscopic, Urine Culture         Aerobic Bacterial            Empiric treatment for presumptive UTI, patient has macrobid already which she use for post coital prevention so encourage to  take Macrobid 100 mg BID X7 days.  Will notify once urine culture is finalized if any adjustments is needed.  Drink plenty of fluids.  Prevention and treatment of UTI's discussed.Signs and symptoms of pyelonephritis mentioned.    Follow up with primary provider to review frequent UTI and consideration for local estrogen.    Dane Alston MD

## 2019-02-24 LAB
BACTERIA SPEC CULT: NO GROWTH
SPECIMEN SOURCE: NORMAL

## 2019-03-08 DIAGNOSIS — Z08 ENCOUNTER FOR FOLLOW-UP SURVEILLANCE OF OVARIAN CANCER: Primary | ICD-10-CM

## 2019-03-08 DIAGNOSIS — Z08 ENCOUNTER FOR FOLLOW-UP SURVEILLANCE OF OVARIAN CANCER: ICD-10-CM

## 2019-03-08 DIAGNOSIS — Z85.43 ENCOUNTER FOR FOLLOW-UP SURVEILLANCE OF OVARIAN CANCER: Primary | ICD-10-CM

## 2019-03-08 DIAGNOSIS — Z85.43 ENCOUNTER FOR FOLLOW-UP SURVEILLANCE OF OVARIAN CANCER: ICD-10-CM

## 2019-03-08 LAB — CANCER AG125 SERPL-ACNC: 12 U/ML (ref 0–30)

## 2019-03-08 PROCEDURE — 86304 IMMUNOASSAY TUMOR CA 125: CPT | Performed by: NURSE PRACTITIONER

## 2019-03-08 PROCEDURE — 25000128 H RX IP 250 OP 636: Performed by: NURSE PRACTITIONER

## 2019-03-08 RX ORDER — HEPARIN SODIUM (PORCINE) LOCK FLUSH IV SOLN 100 UNIT/ML 100 UNIT/ML
5 SOLUTION INTRAVENOUS
Status: COMPLETED | OUTPATIENT
Start: 2019-03-08 | End: 2019-03-08

## 2019-03-08 RX ADMIN — HEPARIN SODIUM (PORCINE) LOCK FLUSH IV SOLN 100 UNIT/ML 5 ML: 100 SOLUTION at 14:58

## 2019-03-08 NOTE — NURSING NOTE
"Chief Complaint   Patient presents with     Lab Only     labs drawn from port by rn.       Port accessed with 20 gauge 3/4\" gripper needle and labs drawn by rn.  Port flushed with NS and heparin then de-accessed.  Pt tolerated well.     Diana Escobar RN      "

## 2019-03-14 NOTE — PROGRESS NOTES
Gynecologic Oncology Follow-Up Visit    RE: Hafsa Corona  MRN: [id number]  : 1955  Date of Visit: 03/15/2019    CC: Hafsa Corona  is a 64 year old female with a history of stage IC2 clear cell ovarian carcinoma. She completed treatment with surgery and three cycles of chemotherapy on 10/17/17. She presents today for a three month surveillance visit.    HPI: Hafsa comes to the clinic feeling well from a gynecologic perspective. She is feeling poorly otherwise though due to a recent URI/body aches- she thinks she may have had influenza but is improving daily and is not having fevers. Started having UTIs again despite prophylactic nitrofurantoin and is wondering about topical estrogen. Chronic issues with intermittent left sided radiculopathy, improves when she changes her shoes. Up to date on seeing her PCP, breast screening, and influenza vaccine. Due for colonoscopy. Had chest CT at [facility] 2018 which demonstrated stability of pulmonary nodules, due for two year stability  of . Sexually active without concern, no bloating, early satiety, pelvic pain, vaginal bleeding.    Oncology History:  The patient has had a recent episode of lower abdominal pain in early July.  She was subsequently sent to the emergency room and was found to have a large 9 cm complex ovarian mass. She was admitted to the hospital for pain control.  7/3/17:  1187  17: Exploratory laparotomy, total abdominal hysterectomy, left salpingo-oophorectomy, cancer staging including infracolic omentectomy, bilateral pelvic & para-aortic lymphadenectomy, peritoneal biopsies, evacuation of pelvic fluid by Dr. Cole.    FINAL DIAGNOSIS:   A. LEFT OVARY AND FALLOPIAN TUBE, LEFT SALPINGO-OOPHORECTOMY:   - Ovarian clear cell carcinoma   - Background of endometriosis   - Fallopian tube with no significant histologic abnormality.   B. UTERUS, HYSTERECTOMY:   -  Inactive endometrium   -  Myometrium with adenomyosis and leiomyoma.   -   Cervix with squamous metaplasia.   C. PERITONEUM, RIGHT PELVIS, BIOPSY:   - Fibroadipose tissue, negative for malignancy.   D. LYMPH NODES, RIGHT PELVIC, DISSECTION:   - Thirteen benign lymph nodes (0/13).   E. LYMPH NODES, LEFT PELVIC, DISSECTION:   - Seven benign lymph nodes (0/7).   F. PERITONEUM, LEFT PELVIS, BIOPSY:   - Fibroadipose tissue, negative for malignancy.   G. PERITONEUM, BLADDER, BIOPSY:   - Fibroadipose tissue  with acute and chronic inflammation, negative for malignancy.   H. PERITONEUM, POSTERIOR CUL-DE-SAC, BIOPSY:   - Fibroadipose tissue, negative for malignancy.   I. PERITONEUM, LEFT PARACOLIC GUTTER, BIOPSY:   - Fibroadipose tissue, negative for malignancy.   J. LYMPH NODES, LEFT PARA-AORTIC, DISSECTION:   - Five benign lymph nodes (0/5).   K. LYMPH NODES, RIGHT PARA-AORTIC, DISSECTION:   - Two benign lymph nodes (0/2).   L. PERITONEUM, RIGHT PARACOLIC GUTTER, BIOPSY:   - Fibroadipose tissue, negative for malignancy.   M. OMENTUM, OMENTECTOMY:   - Adipose tissue with reactive changes, negative for malignancy.   COMMENT:   The final diagnosis confirms the interpretation provided intraoperatively.   Report Name: Ovary or Fallopian Tube   Status: Submitted   Part(s) Involved:   A: Ovary and fallopian tube, left   Synoptic Report:   CLINICAL   Clinical History:   - Pelvic mass   SPECIMEN   Procedure:   - Left salpingo-oophorectomy   - Hysterectomy   - Omentectomy   - Peritoneal  biopsies   Lymph Node Sampling:   - Performed   Location:   - Pelvic lymph nodes   - Para-aortic lymph nodes   Specimen Integrity:   - Left ovary   Specimen Integrity of Left Ovary:   - Capsule intact   TUMOR   Primary Tumor Site(s):   - Left ovary   Left Ovary   Tumor Size of Left Ovary: 19 cm   Histologic Type:   - Clear cell carcinoma   Tumor Extent   Ovarian Surface Involvement:   - Absent   Specimen(s)   Extent of Left Ovary:   - Involved   Extent of Left Fallopian Tube:   - Not involved   Extent of Omentum:   - Not  involved   Extent of Uterus:   - Not involved   Extent of Peritoneum:   - Not involved   Peritoneal Ascitic Fluid:   - Not performed / unknown   Pleural Fluid:   - Not performed / unknown   LYMPH NODES     Number of Pelvic Lymph Nodes Examined: 20     Number of Pelvic Lymph Nodes Involved: None identified     Number of Para-aortic Lymph Nodes Examined: 7     Number of Para-Aortic Lymph Nodes Involved: None identified   STAGE (PTNM, AJCC 7TH ED.)   Primary Tumor (pT):   - Ovary   Ovarian Primary Tumor (pT):   - pT1a: Tumor limited to 1 ovary; capsule intact, no tumor on ovarian surface. No malignant cells in ascites or peritoneal washings#   Regional Lymph Nodes (pN):   - pN0: No regional lymph node metastasis       07/31/17: Dr Shaffer follow up: Discussed with the patient that given the high risk histology, as well as ruptured mass before surgery, she would be qualified at higher risk of recurrence despite early stage ovarian cancer.  Recommended adjuvant chemotherapy. Plan for carboplatin of AUC of 6 and paclitaxel of 175, m2 for 3 cycles. Referral for genetic counselor and will see her back after those 3 cycles  08/03/17: Call from patient stating she is going for a second opinion and would like chemotherapy rescheduled to week of 8/14/17.      08/16/17: Cycle #1 Carbo/Taxol.  73. Significant paclitaxel reaction.  08/30/17: C1 carboplatin/inpatient paclitaxel desensitization.   09/25/17: C2 carboplatin/paclitaxel- inpatient paclitaxel desensitization.  15.  10/16/17: C3 carboplatin/paclitaxel- switched to docetaxel given her neuropathy.  10.  11/14/17:  8. CT CAP:  IMPRESSION:   1. Post surgical changes of hysterectomy and bilateral  salpingo-oophorectomy. Nodular soft tissue density in the posterior  cul-de-sac along with ill-defined nodular thickening in the left  pelvis, suspicious for residual disease or metastatic deposits.   2. There is a 3 cm mass in the superior pole of the  right kidney,  possibly extending into the renal hilum. Represents RCC until proven  otherwise. Consider renal mass protocol CT to assess renal vein  involvement.  3. Stable sub-4 mm pulmonary nodules, continued attention on  follow-up.    11/16/17: CT renal mass protocol  IMPRESSION:  1. Arterially enhancing mass measuring 3.6 x 2.1 x 2.2 cm mass arising  from superior posterior of the right kidney, this is renal cell  carcinoma until proven otherwise.  2. Stable fat-containing umbilical hernia.    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 Margins negative. Three month surveillance plan with [facility].     2/26/18:  14    5/22/18:  11     8/23/18:  11    12/12/18:  10    3/8/19:  12    Past Medical History:   Diagnosis Date     Anxiety state, unspecified     [id number]     Arthritis 2014    vague, gradual, not treated really, knees feel it     Attention deficit disorder without mention of hyperactivity      Cancer (H) 7/2017 and 1/2018    ovarian and kidney cancers, both treated well     Chronic rhinitis      Depressive disorder lifetime    plus Anxiety plus ADD. Escitalipram, therapy, ADD meds maybe     Depressive disorder, not elsewhere classified      Heart disease 1999    tachycardia and high cholesterol, managed     History of blood transfusion 7/2017    while in hospital for ovarian cancer     Hypertension 1999    tho BP was too LOW last week. past 12 years Generally OK     Panic disorder without agoraphobia      Pure hypercholesterolemia     Lipitor     Tachycardia      Tachycardia, unspecified     9/1999-2/2004     Type II or unspecified type diabetes mellitus without mention of complication, not stated as uncontrolled     diagnosed 2004       Past Surgical History:   Procedure Laterality Date     AS REMV KIDNEY,RADICAL Right      BIOPSY  7/2017 and 2/2018    ovarian and kidney cancer biopsies. also 1987 breast benign     BREAST SURGERY  1987     date unsure. benign breast cyst removed     CATARACT IOL, RT/LT Left 05/18/2018     CATARACT IOL, RT/LT Right 07/1318     COLONOSCOPY  2008 and 2013    polyps removed. Next due fall 2018     HYSTERECTOMY TOTAL ABDOMINAL, BILATERAL SALPINGO-OOPHORECTOMY, NODE DISSECTION, COMBINED Left 7/7/2017    Procedure: COMBINED HYSTERECTOMY TOTAL ABDOMINAL, SALPINGO-OOPHORECTOMY, NODE DISSECTION;  Exploratory Laparotomy, Left Salpingo-Oophorectomy, Cancer Staging, Total Hysterectomy, Omentectomy, Evacuation of abdominal fluid, Lymph Node Dissection  Anesthesia Block ;  Surgeon: Serena Cole MD;  Location: [facility] OR     HYSTERECTOMY, PAP NO LONGER INDICATED  07/07/2017    Laparotomy; for ovarian cancer staging     HYSTERECTOMY, PAP NO LONGER INDICATED       INSERT PORT VASCULAR ACCESS Right 8/21/2017    Procedure: INSERT PORT VASCULAR ACCESS;  Single Lumen Chest Power Port;  Surgeon: Stephen Mike PA-C;  Location: [facility] OR     LYMPHADENECTOMY RETROPERITONEAL Bilateral 07/07/2017    Laparotomy; pelvics & para-aortics; for ovarian cancer staging     OMENTECTOMY  07/07/2017    Laparotomy; for ovarian cancer staging     ORTHOPEDIC SURGERY  1/2002    broken left jaw due to fall, 4 fractures, titanium plates     PHACOEMULSIFICATION CLEAR CORNEA WITH STANDARD INTRAOCULAR LENS IMPLANT Right 7/13/2018    Procedure: PHACOEMULSIFICATION CLEAR CORNEA WITH STANDARD INTRAOCULAR LENS IMPLANT;  RIght Eye Phacoemulsification Clear Cornea with Standard Intraocular Lens Placement ;  Surgeon: Mary Jo Massey MD;  Location: [facility] OR     PHACOEMULSIFICATION CLEAR CORNEA WITH TORIC INTRAOCULAR LENS IMPLANT Left 5/18/2018    Procedure: PHACOEMULSIFICATION CLEAR CORNEA WITH TORIC INTRAOCULAR LENS IMPLANT;  Left Eye Phacoemulsification with Toric Lens;  Surgeon: Mary Jo Massey MD;  Location: [facility] OR     SALPINGO OOPHORECTOMY,R/L/KAYY Right 2008    Salpingo Oophorectomy, RT     SALPINGO OOPHORECTOMY,R/L/KAYY Left 07/07/2017    Laparotomy; for  ovarian cancer staging     SURGICAL HISTORY OF -       facial surgery d/t fall in 1/2002     SURGICAL HISTORY OF -       1985 removal of breast cyst     SURGICAL HISTORY OF -   2008    endometrial ablation     YAG CAPSULOTOMY OD (RIGHT EYE)  10/22/2018       Social History     Socioeconomic History     Marital status: Single     Spouse name: Not on file     Number of children: 0     Years of education: Not on file     Highest education level: Not on file   Occupational History     Employer: [employer]   Social Needs     Financial resource strain: Not on file     Food insecurity:     Worry: Not on file     Inability: Not on file     Transportation needs:     Medical: Not on file     Non-medical: Not on file   Tobacco Use     Smoking status: Never Smoker     Smokeless tobacco: Never Used   Substance and Sexual Activity     Alcohol use: Yes     Comment: Very infrequent, a bit of wine or beer 2x per month maybe     Drug use: Yes     Types: Marijuana     Comment: infrequent, for celebrations only, maybe 8x per year     Sexual activity: Yes     Partners: Male     Birth control/protection: None     Comment: long-time    Lifestyle     Physical activity:     Days per week: Not on file     Minutes per session: Not on file     Stress: Not on file   Relationships     Social connections:     Talks on phone: Not on file     Gets together: Not on file     Attends Mandaeism service: Not on file     Active member of club or organization: Not on file     Attends meetings of clubs or organizations: Not on file     Relationship status: Not on file     Intimate partner violence:     Fear of current or ex partner: Not on file     Emotionally abused: Not on file     Physically abused: Not on file     Forced sexual activity: Not on file   Other Topics Concern     Parent/sibling w/ CABG, MI or angioplasty before 65F 55M? No   Social History Narrative     Not on file       Family History   Problem Relation Age of Onset      C.A.D. Father      Diabetes Father         Later in his life, in his 70s     Hypertension Father      Cerebrovascular Disease Father         1984 or      Psychotic Disorder Father      Pancreatic Cancer Father      Coronary Artery Disease Father         diagnosed in his late 50s (age)     Hyperlipidemia Father         treated w statins     Other Cancer Father         pancreatic, ,  of this     Depression Father      Mental Illness Father         likely PTSD and depression     Obesity Father      C.A.D. Mother      Hypertension Mother      Breast Cancer Mother         2 times,  and ?     Psychotic Disorder Mother      Colon Cancer Mother         ?     Cancer Mother         Bone cancer     Coronary Artery Disease Mother         diagnosed in her late 70s (age)     Hyperlipidemia Mother         treated w. statins     Other Cancer Mother         bone/marrow, ,  of this     Depression Mother      Anxiety Disorder Mother      Mental Illness Mother         various undiagnosed types, but THERE     Obesity Mother      Prostate Problems Brother         cleared      Hypertension Brother      Hyperlipidemia Brother         unsure if treated w statins     Depression Brother      Anxiety Disorder Brother      Mental Illness Brother         undiagnosed but THERE     Obesity Brother      Psychotic Disorder Sister         x2     Neurologic Disorder Sister      Hypertension Sister      Hyperlipidemia Sister         treated w statins     Depression Sister      Anxiety Disorder Sister      Obesity Sister      Psychotic Disorder Brother         x2     Depression Brother         major depressive disorder and OCD     Anxiety Disorder Brother      Mental Illness Brother         not sure of diagnoses, treated     Hypertension Brother      Hyperlipidemia Brother      Psychotic Disorder Maternal Grandmother         ?     Coronary Artery Disease Maternal Grandmother          of heart attack age 84?      Depression Maternal Grandmother      Psychotic Disorder Maternal Grandfather         ?     Psychotic Disorder Paternal Grandmother         Schizophernia     Coronary Artery Disease Paternal Grandmother          of heart attack, age 85?     Depression Paternal Grandmother         severe, but recovered     Mental Illness Paternal Grandmother         possible bipolar or other     Psychotic Disorder Paternal Grandfather         ?     Respiratory Paternal Grandfather          of emphysema and smoking     Psychotic Disorder Sister      Other - See Comments Sister         small kidney stone      Hypertension Sister      Hyperlipidemia Sister         treated w statins     Depression Sister      Anxiety Disorder Sister      Cancer - colorectal No family hx of      Glaucoma No family hx of      Macular Degeneration No family hx of        Current Outpatient Medications   Medication     Acetaminophen (TYLENOL PO)     atorvastatin (LIPITOR) 80 MG tablet     B Complex Vitamins (VITAMIN B-COMPLEX PO)     blood glucose (NO BRAND SPECIFIED) lancets standard     blood glucose monitoring (NO BRAND SPECIFIED) meter device kit     blood glucose monitoring (NO BRAND SPECIFIED) test strip     CALCIUM-MAGNESIUM-VITAMIN D PO     Cholecalciferol (VITAMIN D) 1000 UNIT capsule     DiphenhydrAMINE HCl (BENADRYL PO)     EPINEPHrine (EPIPEN/ADRENACLICK/OR ANY BX GENERIC EQUIV) 0.3 MG/0.3ML injection 2-pack     escitalopram (LEXAPRO) 20 MG tablet     fluticasone (FLONASE) 50 MCG/ACT spray     lisdexamfetamine (VYVANSE) 50 MG capsule     lisinopril (PRINIVIL/ZESTRIL) 10 MG tablet     LORazepam (ATIVAN) 1 MG tablet     Magnesium Hydroxide (MAGNESIA PO)     metFORMIN (GLUCOPHAGE-XR) 500 MG 24 hr tablet     Multiple Vitamins-Minerals (MULTIVITAMIN ADULT PO)     nitroFURantoin macrocrystal-monohydrate (MACROBID) 100 MG capsule     order for DME     Probiotic Product (PRO-BIOTIC BLEND PO)     rivaroxaban ANTICOAGULANT (XARELTO) 20 MG TABS  "tablet     traZODone (DESYREL) 50 MG tablet     No current facility-administered medications for this visit.      Facility-Administered Medications Ordered in Other Visits   Medication     sodium chloride (PF) 0.9% PF flush 20 mL          Allergies   Allergen Reactions     Paclitaxel Anaphylaxis and Difficulty breathing     Wasps [Hornets] Anaphylaxis     Yellow Hornet Venom [Hornet Venom] Anaphylaxis and Swelling     Yellow Jacket Venom [Venomil Honey Bee] Anaphylaxis and Swelling     No Clinical Screening - See Comments      Taxol      Wasp Venom Protein Swelling     mental disorientation, hives, yellow jackets, yellow hornets (per pt been through desensitization treatment)     Sulfa Drugs Hives and Rash       Review of Systems  10 point ROS negative other than the symptoms noted above in the HPI.    OBJECTIVE:    Physical Exam:    /78   Pulse 92   Temp 98.9  F (37.2  C) (Oral)   Resp 16   Ht 1.613 m (5' 3.5\")   Wt 93 kg (205 lb)   LMP 01/01/2009   SpO2 97%   BMI 35.74 kg/m      CONSTITUTIONAL: Alert non-toxic appearing female in no acute distress  HEAD: Normocephalic, atraumatic  EYES: PERRLA; no scleral icterus  ENT: Oropharynx pink without lesions  NECK: Neck supple without palpable lymphadenopathy  RESPIRATORY: Lungs clear to auscultation, no increased work of breathing noted, occasional dry cough noted  CV: Regular rate and rhythm, S1S2, no clicks, murmurs, rubs, or gallops; bilateral lower extremities without edema, dorsalis pedis pulses 2+ bilaterally  GASTROINTESTINAL: Normoactive bowel sounds x4 quadrants, abdomen soft, non-distended, and non-tender to palpation without masses or organomegaly  GENITOURINARY: External genitalia and urethral meatus pink without lesions, masses, or excoriation. Vagina pink and smooth without masses or lesions. Vaginal cuff without nodularity, masses, or lesions. Cervix surgically absent. Bimanual exam reveals no masses or fullness. Rectovaginal exam confirms " these findings.  LYMPHATIC: Cervical, supraclavicular, and inguinal nodes without lymphadenopathy  MUSCULOSKELETAL: Moves all extremities, no obvious muscle wasting  NEUROLOGIC: No gross deficits, normal gait  SKIN: Appropriate color for race, warm and dry, no rashes or lesions to unclothed skin  PSYCHIATRIC: Pleasant and interactive, affect bright, makes appropriate eye contact, thought process linear      Data:   12    Assessment/Plan:  1) Stage IC2 clear cell ovarian carcinoma: No signs of recurrence. Continue surveillance every three months x2 years (through 10/2019) followed by every six months x3 years (through 10/2022) then annually thereafter with  and pelvic/rectal exam. Continue q6wk port flushes, may consider removal when she would like. Reviewed signs and symptoms  of recurrence including but not limited to bleeding from vagina, bladder, or rectum, bloating, early satiety, swelling in the lower extremities, abdominal or lower back pain, changes in bowel or bladder patterns, shortness of breath, increased fatigue, unexplained weight loss, and night sweats. Reviewed signs and symptoms for when she should contact the clinic or seek additional care. Patient to contact the clinic with any questions or concerns in the interim.   2) Pulmonary nodules: Stable on imaging 5/2018. Repeat chest CT fall of 2019 for two year stability.  3) Genetic testing: Hafsa is negative for mutations in the AIP, ALK, APC, COSTA, BAP1, BARD1, BLM, BRCA1, BRCA2, BRIP1, BMPR1A, CDH1, CDK4, CDKN1B, CDKN2A, CHEK2, DICER1, EPCAM, FANCC, FH, FLCN, GALNT12, GREM1, HOXB13, MAX, MEN1, MET, MITF, MLH1, MRE11A, MSH2, MSH6, MUTYH, NBN, NF1, NF2, PALB2, PHOX2B, PMS2, POLD1, POLE, POT1, ARZYJ2O, PTCH1, PTEN, RAD50, RAD51C, RAD51D, RB1, RET, SDHA, SDHAF2, SDHB, SDHC, SDHD, SMAD4, SMARCA4, SMARCB1, SMARCE1, STK11, SUFU, PITG588, TP53, TSC1, TSC2, VHL, and XRCC2 genes. No mutations were found in any of the 67 genes analyzed. High risk  breast screening.  4) Labs:  12  5) Health maintenance issues discussed today include to follow up with PCP for co-morbid conditions and non-gynecologic issues. Continue follow up with nephrology. Message sent to Dr. Shaffer to discuss topical estrogen for her recurrent UTIs given her hx of clear cell ovarian cancer. To see PCP if her radiculopathy worsens or if her URI worsens/fails to improve. Due for colonoscopy.  6) Patient verbalized understanding of and agreement with plan.    MARLON Ogden, FNP-C  Division of Gynecologic Oncology  [facility]  Pager: [phone]

## 2019-03-15 ENCOUNTER — ONCOLOGY VISIT (OUTPATIENT)
Dept: ONCOLOGY | Facility: CLINIC | Age: 64
End: 2019-03-15
Attending: NURSE PRACTITIONER
Payer: COMMERCIAL

## 2019-03-15 VITALS
HEIGHT: 64 IN | WEIGHT: 205 LBS | DIASTOLIC BLOOD PRESSURE: 78 MMHG | SYSTOLIC BLOOD PRESSURE: 113 MMHG | TEMPERATURE: 98.9 F | RESPIRATION RATE: 16 BRPM | OXYGEN SATURATION: 97 % | BODY MASS INDEX: 35 KG/M2 | HEART RATE: 92 BPM

## 2019-03-15 DIAGNOSIS — Z08 ENCOUNTER FOR FOLLOW-UP SURVEILLANCE OF OVARIAN CANCER: Primary | ICD-10-CM

## 2019-03-15 DIAGNOSIS — Z85.43 ENCOUNTER FOR FOLLOW-UP SURVEILLANCE OF OVARIAN CANCER: Primary | ICD-10-CM

## 2019-03-15 PROCEDURE — G0463 HOSPITAL OUTPT CLINIC VISIT: HCPCS | Mod: ZF

## 2019-03-15 PROCEDURE — 99213 OFFICE O/P EST LOW 20 MIN: CPT | Mod: ZP | Performed by: NURSE PRACTITIONER

## 2019-03-15 ASSESSMENT — MIFFLIN-ST. JEOR: SCORE: 1451.93

## 2019-03-15 ASSESSMENT — PAIN SCALES - GENERAL: PAINLEVEL: NO PAIN (0)

## 2019-03-15 NOTE — LETTER
RE: Hafsa Corona  [address]  [place] [address]     Dear Colleague,    Thank you for referring your patient, Hafsa Corona, to the [facility]. Please see a copy of my visit note below.    Gynecologic Oncology Follow-Up Visit    RE: Hafsa Corona  MRN: [id number]  : 1955  Date of Visit: 03/15/2019    CC: Hafsa Corona  is a 64 year old female with a history of stage IC2 clear cell ovarian carcinoma. She completed treatment with surgery and three cycles of chemotherapy on 10/17/17. She presents today for a three month surveillance visit.    HPI: Hafsa comes to the clinic feeling well from a gynecologic perspective. She is feeling poorly otherwise though due to a recent URI/body aches- she thinks she may have had influenza but is improving daily and is not having fevers. Started having UTIs again despite prophylactic nitrofurantoin and is wondering about topical estrogen. Chronic issues with intermittent left sided radiculopathy, improves when she changes her shoes. Up to date on seeing her PCP, breast screening, and influenza vaccine. Due for colonoscopy. Had chest CT at [facility] 2018 which demonstrated stability of pulmonary nodules, due for two year stability fall of . Sexually active without concern, no bloating, early satiety, pelvic pain, vaginal bleeding.    Oncology History:  The patient has had a recent episode of lower abdominal pain in early July.  She was subsequently sent to the emergency room and was found to have a large 9 cm complex ovarian mass. She was admitted to the hospital for pain control.  7/3/17:  1187  17: Exploratory laparotomy, total abdominal hysterectomy, left salpingo-oophorectomy, cancer staging including infracolic omentectomy, bilateral pelvic & para-aortic lymphadenectomy, peritoneal biopsies, evacuation of pelvic fluid by Dr. Cole.    FINAL DIAGNOSIS:   A. LEFT OVARY AND FALLOPIAN TUBE, LEFT SALPINGO-OOPHORECTOMY:   - Ovarian  clear cell carcinoma   - Background of endometriosis   - Fallopian tube with no significant histologic abnormality.   B. UTERUS, HYSTERECTOMY:   -  Inactive endometrium   -  Myometrium with adenomyosis and leiomyoma.   -  Cervix with squamous metaplasia.   C. PERITONEUM, RIGHT PELVIS, BIOPSY:   - Fibroadipose tissue, negative for malignancy.   D. LYMPH NODES, RIGHT PELVIC, DISSECTION:   - Thirteen benign lymph nodes (0/13).   E. LYMPH NODES, LEFT PELVIC, DISSECTION:   - Seven benign lymph nodes (0/7).   F. PERITONEUM, LEFT PELVIS, BIOPSY:   - Fibroadipose tissue, negative for malignancy.   G. PERITONEUM, BLADDER, BIOPSY:   - Fibroadipose tissue  with acute and chronic inflammation, negative for malignancy.   H. PERITONEUM, POSTERIOR CUL-DE-SAC, BIOPSY:   - Fibroadipose tissue, negative for malignancy.   I. PERITONEUM, LEFT PARACOLIC GUTTER, BIOPSY:   - Fibroadipose tissue, negative for malignancy.   J. LYMPH NODES, LEFT PARA-AORTIC, DISSECTION:   - Five benign lymph nodes (0/5).   K. LYMPH NODES, RIGHT PARA-AORTIC, DISSECTION:   - Two benign lymph nodes (0/2).   L. PERITONEUM, RIGHT PARACOLIC GUTTER, BIOPSY:   - Fibroadipose tissue, negative for malignancy.   M. OMENTUM, OMENTECTOMY:   - Adipose tissue with reactive changes, negative for malignancy.   COMMENT:   The final diagnosis confirms the interpretation provided intraoperatively.   Report Name: Ovary or Fallopian Tube   Status: Submitted   Part(s) Involved:   A: Ovary and fallopian tube, left   Synoptic Report:   CLINICAL   Clinical History:   - Pelvic mass   SPECIMEN   Procedure:   - Left salpingo-oophorectomy   - Hysterectomy   - Omentectomy   - Peritoneal  biopsies   Lymph Node Sampling:   - Performed   Location:   - Pelvic lymph nodes   - Para-aortic lymph nodes   Specimen Integrity:   - Left ovary   Specimen Integrity of Left Ovary:   - Capsule intact   TUMOR   Primary Tumor Site(s):   - Left ovary   Left Ovary   Tumor Size of Left Ovary: 19 cm    Histologic Type:   - Clear cell carcinoma   Tumor Extent   Ovarian Surface Involvement:   - Absent   Specimen(s)   Extent of Left Ovary:   - Involved   Extent of Left Fallopian Tube:   - Not involved   Extent of Omentum:   - Not involved   Extent of Uterus:   - Not involved   Extent of Peritoneum:   - Not involved   Peritoneal Ascitic Fluid:   - Not performed / unknown   Pleural Fluid:   - Not performed / unknown   LYMPH NODES     Number of Pelvic Lymph Nodes Examined: 20     Number of Pelvic Lymph Nodes Involved: None identified     Number of Para-aortic Lymph Nodes Examined: 7     Number of Para-Aortic Lymph Nodes Involved: None identified   STAGE (PTNM, AJCC 7TH ED.)   Primary Tumor (pT):   - Ovary   Ovarian Primary Tumor (pT):   - pT1a: Tumor limited to 1 ovary; capsule intact, no tumor on ovarian surface. No malignant cells in ascites or peritoneal washings#   Regional Lymph Nodes (pN):   - pN0: No regional lymph node metastasis       07/31/17: Dr Shaffer follow up: Discussed with the patient that given the high risk histology, as well as ruptured mass before surgery, she would be qualified at higher risk of recurrence despite early stage ovarian cancer.  Recommended adjuvant chemotherapy. Plan for carboplatin of AUC of 6 and paclitaxel of 175, m2 for 3 cycles. Referral for genetic counselor and will see her back after those 3 cycles  08/03/17: Call from patient stating she is going for a second opinion and would like chemotherapy rescheduled to week of 8/14/17.      08/16/17: Cycle #1 Carbo/Taxol.  73. Significant paclitaxel reaction.  08/30/17: C1 carboplatin/inpatient paclitaxel desensitization.   09/25/17: C2 carboplatin/paclitaxel- inpatient paclitaxel desensitization.  15.  10/16/17: C3 carboplatin/paclitaxel- switched to docetaxel given her neuropathy.  10.  11/14/17:  8. CT CAP:  IMPRESSION:   1. Post surgical changes of hysterectomy and bilateral  salpingo-oophorectomy.  Nodular soft tissue density in the posterior  cul-de-sac along with ill-defined nodular thickening in the left  pelvis, suspicious for residual disease or metastatic deposits.   2. There is a 3 cm mass in the superior pole of the right kidney,  possibly extending into the renal hilum. Represents RCC until proven  otherwise. Consider renal mass protocol CT to assess renal vein  involvement.  3. Stable sub-4 mm pulmonary nodules, continued attention on  follow-up.    11/16/17: CT renal mass protocol  IMPRESSION:  1. Arterially enhancing mass measuring 3.6 x 2.1 x 2.2 cm mass arising  from superior posterior of the right kidney, this is renal cell  carcinoma until proven otherwise.  2. Stable fat-containing umbilical hernia.    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 Margins negative. Three month surveillance plan with [facility].     2/26/18:  14    5/22/18:  11     8/23/18:  11    12/12/18:  10    3/8/19:  12    Past Medical History:   Diagnosis Date     Anxiety state, unspecified     [id number]     Arthritis 2014    vague, gradual, not treated really, knees feel it     Attention deficit disorder without mention of hyperactivity      Cancer (H) 7/2017 and 1/2018    ovarian and kidney cancers, both treated well     Chronic rhinitis      Depressive disorder lifetime    plus Anxiety plus ADD. Escitalipram, therapy, ADD meds maybe     Depressive disorder, not elsewhere classified      Heart disease 1999    tachycardia and high cholesterol, managed     History of blood transfusion 7/2017    while in hospital for ovarian cancer     Hypertension 1999    tho BP was too LOW last week. past 12 years Generally OK     Panic disorder without agoraphobia      Pure hypercholesterolemia     Lipitor     Tachycardia      Tachycardia, unspecified     9/1999-2/2004     Type II or unspecified type diabetes mellitus without mention of complication, not stated as uncontrolled      diagnosed 2004       Past Surgical History:   Procedure Laterality Date     AS REMV KIDNEY,RADICAL Right      BIOPSY  7/2017 and 2/2018    ovarian and kidney cancer biopsies. also 1987 breast benign     BREAST SURGERY  1987    date unsure. benign breast cyst removed     CATARACT IOL, RT/LT Left 05/18/2018     CATARACT IOL, RT/LT Right 07/1318     COLONOSCOPY  2008 and 2013    polyps removed. Next due fall 2018     HYSTERECTOMY TOTAL ABDOMINAL, BILATERAL SALPINGO-OOPHORECTOMY, NODE DISSECTION, COMBINED Left 7/7/2017    Procedure: COMBINED HYSTERECTOMY TOTAL ABDOMINAL, SALPINGO-OOPHORECTOMY, NODE DISSECTION;  Exploratory Laparotomy, Left Salpingo-Oophorectomy, Cancer Staging, Total Hysterectomy, Omentectomy, Evacuation of abdominal fluid, Lymph Node Dissection  Anesthesia Block ;  Surgeon: Serena Cole MD;  Location: [facility] OR     HYSTERECTOMY, PAP NO LONGER INDICATED  07/07/2017    Laparotomy; for ovarian cancer staging     HYSTERECTOMY, PAP NO LONGER INDICATED       INSERT PORT VASCULAR ACCESS Right 8/21/2017    Procedure: INSERT PORT VASCULAR ACCESS;  Single Lumen Chest Power Port;  Surgeon: Stephen Mike PA-C;  Location: [facility] OR     LYMPHADENECTOMY RETROPERITONEAL Bilateral 07/07/2017    Laparotomy; pelvics & para-aortics; for ovarian cancer staging     OMENTECTOMY  07/07/2017    Laparotomy; for ovarian cancer staging     ORTHOPEDIC SURGERY  1/2002    broken left jaw due to fall, 4 fractures, titanium plates     PHACOEMULSIFICATION CLEAR CORNEA WITH STANDARD INTRAOCULAR LENS IMPLANT Right 7/13/2018    Procedure: PHACOEMULSIFICATION CLEAR CORNEA WITH STANDARD INTRAOCULAR LENS IMPLANT;  RIght Eye Phacoemulsification Clear Cornea with Standard Intraocular Lens Placement ;  Surgeon: Mary Jo Massey MD;  Location: [facility] OR     PHACOEMULSIFICATION CLEAR CORNEA WITH TORIC INTRAOCULAR LENS IMPLANT Left 5/18/2018    Procedure: PHACOEMULSIFICATION CLEAR CORNEA WITH TORIC INTRAOCULAR LENS IMPLANT;  Left Eye  Phacoemulsification with Toric Lens;  Surgeon: Mary Jo Massey MD;  Location: [facility] OR     SALPINGO OOPHORECTOMY,R/L/KYAY Right 2008    Salpingo Oophorectomy, RT     SALPINGO OOPHORECTOMY,R/L/KAYY Left 07/07/2017    Laparotomy; for ovarian cancer staging     SURGICAL HISTORY OF -       facial surgery d/t fall in 1/2002     SURGICAL HISTORY OF -       1985 removal of breast cyst     SURGICAL HISTORY OF -   2008    endometrial ablation     YAG CAPSULOTOMY OD (RIGHT EYE)  10/22/2018       Social History     Socioeconomic History     Marital status: Single     Spouse name: Not on file     Number of children: 0     Years of education: Not on file     Highest education level: Not on file   Occupational History     Employer: [employer]   Social Needs     Financial resource strain: Not on file     Food insecurity:     Worry: Not on file     Inability: Not on file     Transportation needs:     Medical: Not on file     Non-medical: Not on file   Tobacco Use     Smoking status: Never Smoker     Smokeless tobacco: Never Used   Substance and Sexual Activity     Alcohol use: Yes     Comment: Very infrequent, a bit of wine or beer 2x per month maybe     Drug use: Yes     Types: Marijuana     Comment: infrequent, for celebrations only, maybe 8x per year     Sexual activity: Yes     Partners: Male     Birth control/protection: None     Comment: long-time    Lifestyle     Physical activity:     Days per week: Not on file     Minutes per session: Not on file     Stress: Not on file   Relationships     Social connections:     Talks on phone: Not on file     Gets together: Not on file     Attends Evangelical service: Not on file     Active member of club or organization: Not on file     Attends meetings of clubs or organizations: Not on file     Relationship status: Not on file     Intimate partner violence:     Fear of current or ex partner: Not on file     Emotionally abused: Not on file     Physically abused: Not on  file     Forced sexual activity: Not on file   Other Topics Concern     Parent/sibling w/ CABG, MI or angioplasty before 65F 55M? No   Social History Narrative     Not on file       Family History   Problem Relation Age of Onset     C.A.D. Father      Diabetes Father         Later in his life, in his 70s     Hypertension Father      Cerebrovascular Disease Father         1984 or      Psychotic Disorder Father      Pancreatic Cancer Father      Coronary Artery Disease Father         diagnosed in his late 50s (age)     Hyperlipidemia Father         treated w statins     Other Cancer Father         pancreatic, ,  of this     Depression Father      Mental Illness Father         likely PTSD and depression     Obesity Father      C.A.D. Mother      Hypertension Mother      Breast Cancer Mother         2 times,  and ?     Psychotic Disorder Mother      Colon Cancer Mother         ?     Cancer Mother         Bone cancer     Coronary Artery Disease Mother         diagnosed in her late 70s (age)     Hyperlipidemia Mother         treated w. statins     Other Cancer Mother         bone/marrow, ,  of this     Depression Mother      Anxiety Disorder Mother      Mental Illness Mother         various undiagnosed types, but THERE     Obesity Mother      Prostate Problems Brother         cleared      Hypertension Brother      Hyperlipidemia Brother         unsure if treated w statins     Depression Brother      Anxiety Disorder Brother      Mental Illness Brother         undiagnosed but THERE     Obesity Brother      Psychotic Disorder Sister         x2     Neurologic Disorder Sister      Hypertension Sister      Hyperlipidemia Sister         treated w statins     Depression Sister      Anxiety Disorder Sister      Obesity Sister      Psychotic Disorder Brother         x2     Depression Brother         major depressive disorder and OCD     Anxiety Disorder Brother      Mental Illness Brother          not sure of diagnoses, treated     Hypertension Brother      Hyperlipidemia Brother      Psychotic Disorder Maternal Grandmother         ?     Coronary Artery Disease Maternal Grandmother          of heart attack age 84?     Depression Maternal Grandmother      Psychotic Disorder Maternal Grandfather         ?     Psychotic Disorder Paternal Grandmother         Schizophernia     Coronary Artery Disease Paternal Grandmother          of heart attack, age 85?     Depression Paternal Grandmother         severe, but recovered     Mental Illness Paternal Grandmother         possible bipolar or other     Psychotic Disorder Paternal Grandfather         ?     Respiratory Paternal Grandfather          of emphysema and smoking     Psychotic Disorder Sister      Other - See Comments Sister         small kidney stone      Hypertension Sister      Hyperlipidemia Sister         treated w statins     Depression Sister      Anxiety Disorder Sister      Cancer - colorectal No family hx of      Glaucoma No family hx of      Macular Degeneration No family hx of        Current Outpatient Medications   Medication     Acetaminophen (TYLENOL PO)     atorvastatin (LIPITOR) 80 MG tablet     B Complex Vitamins (VITAMIN B-COMPLEX PO)     blood glucose (NO BRAND SPECIFIED) lancets standard     blood glucose monitoring (NO BRAND SPECIFIED) meter device kit     blood glucose monitoring (NO BRAND SPECIFIED) test strip     CALCIUM-MAGNESIUM-VITAMIN D PO     Cholecalciferol (VITAMIN D) 1000 UNIT capsule     DiphenhydrAMINE HCl (BENADRYL PO)     EPINEPHrine (EPIPEN/ADRENACLICK/OR ANY BX GENERIC EQUIV) 0.3 MG/0.3ML injection 2-pack     escitalopram (LEXAPRO) 20 MG tablet     fluticasone (FLONASE) 50 MCG/ACT spray     lisdexamfetamine (VYVANSE) 50 MG capsule     lisinopril (PRINIVIL/ZESTRIL) 10 MG tablet     LORazepam (ATIVAN) 1 MG tablet     Magnesium Hydroxide (MAGNESIA PO)     metFORMIN (GLUCOPHAGE-XR) 500 MG 24 hr tablet     Multiple  "Vitamins-Minerals (MULTIVITAMIN ADULT PO)     nitroFURantoin macrocrystal-monohydrate (MACROBID) 100 MG capsule     order for DME     Probiotic Product (PRO-BIOTIC BLEND PO)     rivaroxaban ANTICOAGULANT (XARELTO) 20 MG TABS tablet     traZODone (DESYREL) 50 MG tablet     No current facility-administered medications for this visit.      Facility-Administered Medications Ordered in Other Visits   Medication     sodium chloride (PF) 0.9% PF flush 20 mL          Allergies   Allergen Reactions     Paclitaxel Anaphylaxis and Difficulty breathing     Wasps [Hornets] Anaphylaxis     Yellow Hornet Venom [Hornet Venom] Anaphylaxis and Swelling     Yellow Jacket Venom [Venomil Honey Bee] Anaphylaxis and Swelling     No Clinical Screening - See Comments      Taxol      Wasp Venom Protein Swelling     mental disorientation, hives, yellow jackets, yellow hornets (per pt been through desensitization treatment)     Sulfa Drugs Hives and Rash     OBJECTIVE:    Physical Exam:    /78   Pulse 92   Temp 98.9  F (37.2  C) (Oral)   Resp 16   Ht 1.613 m (5' 3.5\")   Wt 93 kg (205 lb)   LMP 01/01/2009   SpO2 97%   BMI 35.74 kg/m       CONSTITUTIONAL: Alert non-toxic appearing female in no acute distress  HEAD: Normocephalic, atraumatic  EYES: PERRLA; no scleral icterus  ENT: Oropharynx pink without lesions  NECK: Neck supple without palpable lymphadenopathy  RESPIRATORY: Lungs clear to auscultation, no increased work of breathing noted, occasional dry cough noted  CV: Regular rate and rhythm, S1S2, no clicks, murmurs, rubs, or gallops; bilateral lower extremities without edema, dorsalis pedis pulses 2+ bilaterally  GASTROINTESTINAL: Normoactive bowel sounds x4 quadrants, abdomen soft, non-distended, and non-tender to palpation without masses or organomegaly  GENITOURINARY: External genitalia and urethral meatus pink without lesions, masses, or excoriation. Vagina pink and smooth without masses or lesions. Vaginal cuff without " nodularity, masses, or lesions. Cervix surgically absent. Bimanual exam reveals no masses or fullness. Rectovaginal exam confirms these findings.  LYMPHATIC: Cervical, supraclavicular, and inguinal nodes without lymphadenopathy  MUSCULOSKELETAL: Moves all extremities, no obvious muscle wasting  NEUROLOGIC: No gross deficits, normal gait  SKIN: Appropriate color for race, warm and dry, no rashes or lesions to unclothed skin  PSYCHIATRIC: Pleasant and interactive, affect bright, makes appropriate eye contact, thought process linear      Data:   12    Assessment/Plan:  1) Stage IC2 clear cell ovarian carcinoma: No signs of recurrence. Continue surveillance every three months x2 years (through 10/2019) followed by every six months x3 years (through 10/2022) then annually thereafter with  and pelvic/rectal exam. Continue q6wk port flushes, may consider removal when she would like. Reviewed signs and symptoms  of recurrence including but not limited to bleeding from vagina, bladder, or rectum, bloating, early satiety, swelling in the lower extremities, abdominal or lower back pain, changes in bowel or bladder patterns, shortness of breath, increased fatigue, unexplained weight loss, and night sweats. Reviewed signs and symptoms for when she should contact the clinic or seek additional care. Patient to contact the clinic with any questions or concerns in the interim.   2) Pulmonary nodules: Stable on imaging 5/2018. Repeat chest CT fall of 2019 for two year stability.  3) Genetic testing: Hafsa is negative for mutations in the AIP, ALK, APC, COSTA, BAP1, BARD1, BLM, BRCA1, BRCA2, BRIP1, BMPR1A, CDH1, CDK4, CDKN1B, CDKN2A, CHEK2, DICER1, EPCAM, FANCC, FH, FLCN, GALNT12, GREM1, HOXB13, MAX, MEN1, MET, MITF, MLH1, MRE11A, MSH2, MSH6, MUTYH, NBN, NF1, NF2, PALB2, PHOX2B, PMS2, POLD1, POLE, POT1, XDKNM4L, PTCH1, PTEN, RAD50, RAD51C, RAD51D, RB1, RET, SDHA, SDHAF2, SDHB, SDHC, SDHD, SMAD4, SMARCA4, SMARCB1, SMARCE1,  STK11, SUFU, YZGV633, TP53, TSC1, TSC2, VHL, and XRCC2 genes. No mutations were found in any of the 67 genes analyzed. High risk breast screening.  4) Labs:  12  5) Health maintenance issues discussed today include to follow up with PCP for co-morbid conditions and non-gynecologic issues. Continue follow up with nephrology. Message sent to Dr. Shaffer to discuss topical estrogen for her recurrent UTIs given her hx of clear cell ovarian cancer. To see PCP if her radiculopathy worsens or if her URI worsens/fails to improve. Due for colonoscopy.  6) Patient verbalized understanding of and agreement with plan.    MARLON Ogdne, FNP-C  Division of Gynecologic Oncology  [facility]  Pager: [phone]

## 2019-03-15 NOTE — NURSING NOTE
"Oncology Rooming Note    March 15, 2019 3:30 PM   Hafsa Corona is a 64 year old female who presents for:    Chief Complaint   Patient presents with     Oncology Clinic Visit     Return Ovarian Ca     Initial Vitals: /78   Pulse 92   Temp 98.9  F (37.2  C) (Oral)   Resp 16   Ht 1.613 m (5' 3.5\")   Wt 93 kg (205 lb)   LMP 01/01/2009   SpO2 97%   BMI 35.74 kg/m   Estimated body mass index is 35.74 kg/m  as calculated from the following:    Height as of this encounter: 1.613 m (5' 3.5\").    Weight as of this encounter: 93 kg (205 lb). Body surface area is 2.04 meters squared.  No Pain (0) Comment: Data Unavailable   Patient's last menstrual period was 01/01/2009.  Allergies reviewed: Yes  Medications reviewed: Yes    Medications: Medication refills not needed today.  Pharmacy name entered into [facility]: [id number] - [place], [place] - [address] AT [facility] [address]    Clinical concerns: patient has a cold; coughing; Pelvic pain and low back pain;       Nevaeh Sarabia CMA              "

## 2019-04-09 ENCOUNTER — TELEPHONE (OUTPATIENT)
Dept: ONCOLOGY | Facility: CLINIC | Age: 64
End: 2019-04-09

## 2019-04-09 ENCOUNTER — ANCILLARY PROCEDURE (OUTPATIENT)
Dept: MRI IMAGING | Facility: CLINIC | Age: 64
End: 2019-04-09
Attending: CLINICAL NURSE SPECIALIST
Payer: COMMERCIAL

## 2019-04-09 DIAGNOSIS — Z12.39 BREAST CANCER SCREENING, HIGH RISK PATIENT: ICD-10-CM

## 2019-04-09 DIAGNOSIS — Z80.3 FAMILY HISTORY OF MALIGNANT NEOPLASM OF BREAST: ICD-10-CM

## 2019-04-09 PROCEDURE — 25000128 H RX IP 250 OP 636: Performed by: CLINICAL NURSE SPECIALIST

## 2019-04-09 PROCEDURE — 96523 IRRIG DRUG DELIVERY DEVICE: CPT

## 2019-04-09 RX ORDER — GADOBUTROL 604.72 MG/ML
10 INJECTION INTRAVENOUS ONCE
Status: COMPLETED | OUTPATIENT
Start: 2019-04-09 | End: 2019-04-09

## 2019-04-09 RX ORDER — HEPARIN SODIUM (PORCINE) LOCK FLUSH IV SOLN 100 UNIT/ML 100 UNIT/ML
5 SOLUTION INTRAVENOUS EVERY 8 HOURS
Status: DISCONTINUED | OUTPATIENT
Start: 2019-04-09 | End: 2019-04-17 | Stop reason: HOSPADM

## 2019-04-09 RX ADMIN — GADOBUTROL 10 ML: 604.72 INJECTION INTRAVENOUS at 20:00

## 2019-04-09 RX ADMIN — HEPARIN SODIUM (PORCINE) LOCK FLUSH IV SOLN 100 UNIT/ML 5 ML: 100 SOLUTION at 18:27

## 2019-04-09 NOTE — TELEPHONE ENCOUNTER
Patient called with the ok to use topical estrogen per Dr Shaffer.     Patient unavailable. Voicemail left.     Morelia Lehman RN

## 2019-04-10 NOTE — DISCHARGE INSTRUCTIONS
MRI Contrast Discharge Instructions    The IV contrast you received today will pass out of your body in your  urine. This will happen in the next 24 hours. You will not feel this process.  Your urine will not change color.    Drink at least 4 extra glasses of water or juice today (unless your doctor  has restricted your fluids). This reduces the stress on your kidneys.  You may take your regular medicines.    If you are on dialysis: It is best to have dialysis today.    If you have a reaction: Most reactions happen right away. If you have  any new symptoms after leaving the hospital (such as hives or swelling),  call your hospital at the correct number below. Or call your family doctor.  If you have breathing distress or wheezing, call 911.    Special instructions: ***    I have read and understand the above information.    Signature:______________________________________ Date:___________    Staff:__________________________________________ Date:___________     Time:__________    [place] [facility] Departments:    ___[place]: [phone]  ___[facility]: [phone]  ___[place]: [phone] ___[facility]: [phone]  ___[facility]: [phone]  ___[facility]: [phone]  ___[phone]  ___[facility]: [phone]  ___[phone]

## 2019-04-11 DIAGNOSIS — I10 ESSENTIAL HYPERTENSION: ICD-10-CM

## 2019-04-12 NOTE — TELEPHONE ENCOUNTER
Creatinine   Date Value Ref Range Status   01/29/2019 1.05 (H) 0.52 - 1.04 mg/dL Final     Per MD, above reading improved when reviewed at that time    BP Readings from Last 3 Encounters:   03/15/19 113/78   02/23/19 106/74   01/29/19 122/66     Because pt has one kidney Refill request to MD/primary care provider.  Erin Orozco RN

## 2019-04-15 RX ORDER — LISINOPRIL 10 MG/1
10 TABLET ORAL DAILY
Qty: 90 TABLET | Refills: 3 | Status: SHIPPED | OUTPATIENT
Start: 2019-04-15 | End: 2019-04-26 | Stop reason: DRUGHIGH

## 2019-04-24 DIAGNOSIS — N89.8 VAGINAL DRYNESS: Primary | ICD-10-CM

## 2019-04-24 RX ORDER — ESTRADIOL 10 UG/1
10 INSERT VAGINAL
Qty: 15 TABLET | Refills: 1 | Status: SHIPPED | OUTPATIENT
Start: 2019-04-25 | End: 2019-10-01

## 2019-04-26 ENCOUNTER — OFFICE VISIT (OUTPATIENT)
Dept: FAMILY MEDICINE | Facility: CLINIC | Age: 64
End: 2019-04-26
Payer: COMMERCIAL

## 2019-04-26 VITALS
OXYGEN SATURATION: 97 % | HEART RATE: 81 BPM | SYSTOLIC BLOOD PRESSURE: 110 MMHG | TEMPERATURE: 97.9 F | BODY MASS INDEX: 35.36 KG/M2 | WEIGHT: 202.8 LBS | DIASTOLIC BLOOD PRESSURE: 60 MMHG

## 2019-04-26 DIAGNOSIS — J06.9 UPPER RESPIRATORY TRACT INFECTION, UNSPECIFIED TYPE: Primary | ICD-10-CM

## 2019-04-26 DIAGNOSIS — I10 BENIGN ESSENTIAL HYPERTENSION: ICD-10-CM

## 2019-04-26 PROCEDURE — 99214 OFFICE O/P EST MOD 30 MIN: CPT | Performed by: FAMILY MEDICINE

## 2019-04-26 RX ORDER — LISINOPRIL 5 MG/1
5 TABLET ORAL DAILY
Qty: 90 TABLET | Refills: 1 | Status: SHIPPED | OUTPATIENT
Start: 2019-04-26 | End: 2019-10-01

## 2019-04-26 RX ORDER — AZITHROMYCIN 250 MG/1
TABLET, FILM COATED ORAL
Qty: 6 TABLET | Refills: 0 | Status: SHIPPED | OUTPATIENT
Start: 2019-04-26 | End: 2019-09-04

## 2019-04-26 NOTE — PROGRESS NOTES
SUBJECTIVE:   Hafsa Corona is a 64 year old female who presents to clinic today for the following   health issues:      Acute Illness   Acute illness concerns: URI   Onset: 16 th of this month     Fever: YES- initially     Chills/Sweats: no     Headache (location?): no     Sinus Pressure:YES    Conjunctivitis:  no    Ear Pain: YES: bilateral intermittently     Rhinorrhea: YES    Congestion: YES    Sore Throat: YES     Cough: YES    Wheeze: no but hurts when she tries to take a breath     Decreased Appetite: YES    Nausea: YES due to coughing so much     Vomiting: no     Diarrhea:  no     Dysuria/Freq.: no     Fatigue/Achiness: YES    Sick/Strep Exposure: YES     Therapies Tried and outcome: tylenol , generic mucinex which did not help    She did have the flu which started on the 9th and has had the cough since then     She also has some concerns regarding skin rash which was a cause of her dog biting her when she was angry at another dog     Blood pressure low today.  She has been feeling dizzy and low energy.   Reviewed  and updated as needed this visit by clinical staff         Reviewed and updated as needed this visit by Provider         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)     Pain in joint, multiple sites     ACP (advance care planning)     Renal cell carcinoma, right (H)     Solitary kidney, acquired     Encounter for  follow-up surveillance of ovarian cancer     Personal history of DVT (deep vein thrombosis)     Abscess of left lower extremity     ADD (attention deficit disorder) without hyperactivity     Angiomyolipoma of kidney     Anxiety     Coronary atherosclerosis     Depressive disorder     Dyslipidemia     Dysthymic disorder     Generalized anxiety disorder     Heel pain     Lymphedema     Mild anemia     Normocytic anemia     Renal oncocytoma     Panic disorder with agoraphobia     Past Surgical History:   Procedure Laterality Date     AS REMV KIDNEY,RADICAL Right      BIOPSY  7/2017 and 2/2018    ovarian and kidney cancer biopsies. also 1987 breast benign     BREAST SURGERY  1987    date unsure. benign breast cyst removed     CATARACT IOL, RT/LT Left 05/18/2018     CATARACT IOL, RT/LT Right 07/1318     COLONOSCOPY  2008 and 2013    polyps removed. Next due fall 2018     HYSTERECTOMY TOTAL ABDOMINAL, BILATERAL SALPINGO-OOPHORECTOMY, NODE DISSECTION, COMBINED Left 7/7/2017    Procedure: COMBINED HYSTERECTOMY TOTAL ABDOMINAL, SALPINGO-OOPHORECTOMY, NODE DISSECTION;  Exploratory Laparotomy, Left Salpingo-Oophorectomy, Cancer Staging, Total Hysterectomy, Omentectomy, Evacuation of abdominal fluid, Lymph Node Dissection  Anesthesia Block ;  Surgeon: Serena Cole MD;  Location: [facility] OR     HYSTERECTOMY, PAP NO LONGER INDICATED  07/07/2017    Laparotomy; for ovarian cancer staging     HYSTERECTOMY, PAP NO LONGER INDICATED       INSERT PORT VASCULAR ACCESS Right 8/21/2017    Procedure: INSERT PORT VASCULAR ACCESS;  Single Lumen Chest Power Port;  Surgeon: Stephen Mike PA-C;  [facility]     LYMPHADENECTOMY RETROPERITONEAL Bilateral 07/07/2017    Laparotomy; pelvics & para-aortics; for ovarian cancer staging     OMENTECTOMY  07/07/2017    Laparotomy; for ovarian cancer staging     ORTHOPEDIC SURGERY  1/2002    broken left jaw due to fall, 4 fractures, titanium plates     PHACOEMULSIFICATION CLEAR CORNEA WITH  STANDARD INTRAOCULAR LENS IMPLANT Right 2018    Procedure: PHACOEMULSIFICATION CLEAR CORNEA WITH STANDARD INTRAOCULAR LENS IMPLANT;  RIght Eye Phacoemulsification Clear Cornea with Standard Intraocular Lens Placement ;  Surgeon: Mary Jo Massey MD;  [facility]     PHACOEMULSIFICATION CLEAR CORNEA WITH TORIC INTRAOCULAR LENS IMPLANT Left 2018    Procedure: PHACOEMULSIFICATION CLEAR CORNEA WITH TORIC INTRAOCULAR LENS IMPLANT;  Left Eye Phacoemulsification with Toric Lens;  Surgeon: Mary Jo Massey MD;  Location[facility]     SALPINGO OOPHORECTOMY,R/L/KAYY Right     Salpingo Oophorectomy, RT     SALPINGO OOPHORECTOMY,R/L/KAYY Left 2017    Laparotomy; for ovarian cancer staging     SURGICAL HISTORY OF -       facial surgery d/t fall in 2002     SURGICAL HISTORY OF -        removal of breast cyst     SURGICAL HISTORY OF -       endometrial ablation     YAG CAPSULOTOMY OD (RIGHT EYE)  10/22/2018       Social History     Tobacco Use     Smoking status: Never Smoker     Smokeless tobacco: Never Used   Substance Use Topics     Alcohol use: Yes     Comment: Very infrequent, a bit of wine or beer 2x per month maybe     Family History   Problem Relation Age of Onset     C.A.D. Father      Diabetes Father         Later in his life, in his 70s     Hypertension Father      Cerebrovascular Disease Father         1984 or      Psychotic Disorder Father      Pancreatic Cancer Father      Coronary Artery Disease Father         diagnosed in his late 50s (age)     Hyperlipidemia Father         treated w statins     Other Cancer Father         pancreatic, ,  of this     Depression Father      Mental Illness Father         likely PTSD and depression     Obesity Father      C.A.D. Mother      Hypertension Mother      Breast Cancer Mother         2 times,  and ?     Psychotic Disorder Mother      Colon Cancer Mother         ?     Cancer Mother         Bone cancer     Coronary  Artery Disease Mother         diagnosed in her late 70s (age)     Hyperlipidemia Mother         treated w. statins     Other Cancer Mother         bone/marrow, ,  of this     Depression Mother      Anxiety Disorder Mother      Mental Illness Mother         various undiagnosed types, but THERE     Obesity Mother      Prostate Problems Brother         cleared      Hypertension Brother      Hyperlipidemia Brother         unsure if treated w statins     Depression Brother      Anxiety Disorder Brother      Mental Illness Brother         undiagnosed but THERE     Obesity Brother      Psychotic Disorder Sister         x2     Neurologic Disorder Sister      Hypertension Sister      Hyperlipidemia Sister         treated w statins     Depression Sister      Anxiety Disorder Sister      Obesity Sister      Psychotic Disorder Brother         x2     Depression Brother         major depressive disorder and OCD     Anxiety Disorder Brother      Mental Illness Brother         not sure of diagnoses, treated     Hypertension Brother      Hyperlipidemia Brother      Psychotic Disorder Maternal Grandmother         ?     Coronary Artery Disease Maternal Grandmother          of heart attack age 84?     Depression Maternal Grandmother      Psychotic Disorder Maternal Grandfather         ?     Psychotic Disorder Paternal Grandmother         Schizophernia     Coronary Artery Disease Paternal Grandmother          of heart attack, age 85?     Depression Paternal Grandmother         severe, but recovered     Mental Illness Paternal Grandmother         possible bipolar or other     Psychotic Disorder Paternal Grandfather         ?     Respiratory Paternal Grandfather          of emphysema and smoking     Psychotic Disorder Sister      Other - See Comments Sister         small kidney stone      Hypertension Sister      Hyperlipidemia Sister         treated w statins     Depression Sister      Anxiety Disorder Sister       Cancer - colorectal No family hx of      Glaucoma No family hx of      Macular Degeneration No family hx of          Current Outpatient Medications   Medication Sig Dispense Refill     Acetaminophen (TYLENOL PO) Take 500 mg by mouth 2 tabs prn pain (monthly)       atorvastatin (LIPITOR) 80 MG tablet TAKE 1 TABLET(80 MG) BY MOUTH DAILY 90 tablet PRN     B Complex Vitamins (VITAMIN B-COMPLEX PO) Take 50 mg by mouth        blood glucose (NO BRAND SPECIFIED) lancets standard Use to test blood sugar 2 times daily or as directed. 100 each 11     blood glucose monitoring (NO BRAND SPECIFIED) meter device kit Use to test blood sugar 2 times daily or as directed. 1 kit 0     blood glucose monitoring (NO BRAND SPECIFIED) test strip Use to test blood sugars 2 times daily or as directed 100 strip PRN     CALCIUM-MAGNESIUM-VITAMIN D PO Take 2 tablets by mouth daily       Cholecalciferol (VITAMIN D) 1000 UNIT capsule Take 2,000 Units by mouth daily        DiphenhydrAMINE HCl (BENADRYL PO) Take 50 mg by mouth daily as needed (monthly)       escitalopram (LEXAPRO) 20 MG tablet Take 1 tablet (20 mg) by mouth daily 90 tablet 3     estradiol (VAGIFEM) 10 MCG TABS vaginal tablet Place 1 tablet (10 mcg) vaginally twice a week 15 tablet 1     fluticasone (FLONASE) 50 MCG/ACT spray SHAKE WELL AND USE 2 SPRAYS IN EACH NOSTRIL DAILY 48 g 3     lisdexamfetamine (VYVANSE) 50 MG capsule Take 50 mg by mouth       lisinopril (PRINIVIL/ZESTRIL) 10 MG tablet Take 1 tablet (10 mg) by mouth daily 90 tablet 3     Magnesium Hydroxide (MAGNESIA PO) Take 500 mg by mouth       metFORMIN (GLUCOPHAGE-XR) 500 MG 24 hr tablet TAKE 2 TABLETS BY MOUTH ONCE DAILY WITH EVENING MEAL 180 tablet PRN     Multiple Vitamins-Minerals (MULTIVITAMIN ADULT PO) Take 1 tablet by mouth daily       nitroFURantoin macrocrystal-monohydrate (MACROBID) 100 MG capsule TK 1 C PO AFTER INTERCOUSE D PRN  0     order for DME Home blood pressure monitor 1 Units 0     Probiotic Product  (PRO-BIOTIC BLEND PO) Take 1 capsule by mouth daily Enzymatic Therapy-probiotic pearls       traZODone (DESYREL) 50 MG tablet TAKE 1 TO 2 TABLETS(50  MG) BY MOUTH EVERY NIGHT AS NEEDED FOR SLEEP 180 tablet 1     EPINEPHrine (EPIPEN/ADRENACLICK/OR ANY BX GENERIC EQUIV) 0.3 MG/0.3ML injection 2-pack Inject 0.3 mLs (0.3 mg) into the muscle once as needed for anaphylaxis (Patient not taking: Reported on 3/15/2019) 0.3 mL PRN     LORazepam (ATIVAN) 1 MG tablet Take 1 tablet (1 mg) by mouth every 6 hours as needed (nausea/vomiting, anxiety or sleep) (Patient not taking: Reported on 3/15/2019) 30 tablet 1     rivaroxaban ANTICOAGULANT (XARELTO) 20 MG TABS tablet Take 1 tablet (20 mg) by mouth daily (with dinner) (Patient not taking: Reported on 3/15/2019) 30 tablet 11     Allergies   Allergen Reactions     Paclitaxel Anaphylaxis and Difficulty breathing     Wasps [Hornets] Anaphylaxis     Yellow Hornet Venom [Hornet Venom] Anaphylaxis and Swelling     Yellow Jacket Venom [Venomil Honey Bee] Anaphylaxis and Swelling     Sulfa Drugs Hives and Rash     Recent Labs   Lab Test 01/29/19  1608 07/09/18  1351 05/10/18  1558  11/14/17  0949 11/01/17  1457 10/16/17  0824  08/31/17  0749  04/11/17  1125 09/06/16  1131 02/04/16  1255   A1C  --   --  5.6  --   --   --   --   --  6.1*  --  6.0 6.2* 6.0   LDL  --   --   --   --   --  70  --   --   --   --   --  78 189*   HDL  --   --   --   --   --  54  --   --   --   --   --  59 61   TRIG  --   --   --   --   --  121  --   --   --   --   --  159* 133   ALT  --   --  21  --  16  --  23   < >  --    < >  --   --  16   CR 1.05* 1.19* 1.16*   < > 0.71  --  0.67   < >  --    < > 0.75  --  0.76   GFRESTIMATED 56* 46* 47*   < > 83  --  89   < >  --    < > 78  --  77   GFRESTBLACK 65 55* 57*   < > >90  --  >90   < >  --    < > >90   GFR Calc    --  >90   GFR Calc     POTASSIUM 4.2 4.2 4.4   < > 4.1  --  4.1   < >  --    < > 4.1  --  4.2   TSH  --   --   --    --   --   --   --   --   --   --   --   --  1.86    < > = values in this interval not displayed.      BP Readings from Last 3 Encounters:   03/15/19 113/78   02/23/19 106/74   01/29/19 122/66    Wt Readings from Last 3 Encounters:   04/26/19 92 kg (202 lb 12.8 oz)   03/15/19 93 kg (205 lb)   02/23/19 97.5 kg (215 lb)                    ROS:  Constitutional, HEENT, cardiovascular, pulmonary, gi and gu systems are negative, except as otherwise noted.    OBJECTIVE:     Pulse 81   Temp 97.9  F (36.6  C) (Oral)   Wt 92 kg (202 lb 12.8 oz)   LMP 01/01/2009   SpO2 97%   BMI 35.36 kg/m    Body mass index is 35.36 kg/m . BP 90/60 today in clinic rechecked on manual cuff  GENERAL: healthy, alert and no distress  EYES: Eyes grossly normal to inspection, PERRL and conjunctivae and sclerae normal  HENT: ear canals and TM's normal, nose and mouth without ulcers or lesions  NECK: no adenopathy, no asymmetry, masses, or scars and thyroid normal to palpation  RESP: lungs clear to auscultation - no rales, rhonchi or wheezes, harsh congested cough  CV: regular rate and rhythm, normal S1 S2, no S3 or S4, no murmur, click or rub, no peripheral edema and peripheral pulses strong  ABDOMEN: soft, nontender, no hepatosplenomegaly, no masses and bowel sounds normal  MS: no gross musculoskeletal defects noted, no edema  SKIN: no suspicious lesions or rashes  PSYCH: mentation appears normal, affect normal/bright    ASSESSMENT/PLAN:     1. Upper respiratory tract infection, unspecified type    - azithromycin (ZITHROMAX) 250 MG tablet; Take 2 tablets (500 mg) by mouth daily for 1 day, THEN 1 tablet (250 mg) daily for 4 days.  Dispense: 6 tablet; Refill: 0    Will treat with Zpak.  Continue with increased fluids and rest.  Close Follow-up with PCP if no change or worsening symptoms prn.    2. HTN    Blood pressure too low today-- recommend cutting lisinopril in half as current dose of 10 mg daily is pulling her too low.  Recommend bp  recheck in 2 weeks and further adjustment of medication then prn.    Diana Emmanuel MD  [facility]

## 2019-05-03 ENCOUNTER — TELEPHONE (OUTPATIENT)
Dept: FAMILY MEDICINE | Facility: CLINIC | Age: 64
End: 2019-05-03

## 2019-05-03 NOTE — TELEPHONE ENCOUNTER
Triage took call reporting that she was much improved with bronchitis sx for the first 5 days and now notes her cough returning and some swollen gland in the neck.  She is fatigued but admits to be doing a lot more activities as she was feeling better up until yesterday.  The antibiotic still working in her system through Monday 5/6.   Plan:  Update us on Monday as to how she is feeling.  She may use [facility] on weekend if sx are worsening.  Shoshana Tena RN

## 2019-06-04 DIAGNOSIS — E11.9 TYPE 2 DIABETES MELLITUS WITHOUT COMPLICATION, WITHOUT LONG-TERM CURRENT USE OF INSULIN (H): ICD-10-CM

## 2019-06-04 NOTE — TELEPHONE ENCOUNTER
"Requested Prescriptions   Pending Prescriptions Disp Refills     metFORMIN (GLUCOPHAGE-XR) 500 MG 24 hr tablet [Pharmacy Med Name: METFORMIN ER 500MG 24HR TABS] 180 tablet 0     Sig: TAKE 2 TABLETS BY MOUTH EVERY DAY WITH THE EVENING MEAL      Last Written Prescription Date:  5/10/2018  Last Fill Quantity: 180 tablet    ,  # refills: PRN   Last Office Visit: 4/26/2019   Future Office Visit:            Biguanide Agents Failed - 6/4/2019  3:45 AM        Failed - Patient has documented LDL within the past 12 mos.     Recent Labs   Lab Test 11/01/17  1457   LDL 70           Failed - Patient has documented A1c within the specified period of time.     If HgbA1C is 8 or greater, it needs to be on file within the past 3 months.  If less than 8, must be on file within the past 6 months.     Recent Labs   Lab Test 05/10/18  1558   A1C 5.6           Passed - Blood pressure less than 140/90 in past 6 months     BP Readings from Last 3 Encounters:   04/26/19 110/60   03/15/19 113/78   02/23/19 106/74           Passed - Patient has had a Microalbumin in the past 15 mos.     Recent Labs   Lab Test 07/09/18  1359   MICROL <5   UMALCR Unable to calculate due to low value           Passed - Patient is age 10 or older        Passed - Patient's CR is NOT>1.4 OR Patient's EGFR is NOT<45 within past 12 mos.     Recent Labs   Lab Test 01/29/19  1608   GFRESTIMATED 56*   GFRESTBLACK 65     Recent Labs   Lab Test 01/29/19  1608   CR 1.05*           Passed - Patient does NOT have a diagnosis of CHF.        Passed - Medication is active on med list        Passed - Patient is not pregnant        Passed - Patient has not had a positive pregnancy test within the past 12 mos.         Passed - Recent (6 mo) or future (30 days) visit within the authorizing provider's specialty     Patient had office visit in the last 6 months or has a visit in the next 30 days with authorizing provider or within the authorizing provider's specialty.  See \"Patient " "Info\" tab in inbasket, or \"Choose Columns\" in Meds & Orders section of the refill encounter.            "

## 2019-06-06 RX ORDER — METFORMIN HCL 500 MG
TABLET, EXTENDED RELEASE 24 HR ORAL
Qty: 180 TABLET | Refills: 0 | Status: SHIPPED | OUTPATIENT
Start: 2019-06-06 | End: 2019-08-31

## 2019-06-06 NOTE — TELEPHONE ENCOUNTER
Routing refill request to provider for review/approval because:  Labs not current:    Lab Results   Component Value Date    A1C 5.6 05/10/2018    A1C 6.1 08/31/2017    A1C 6.0 04/11/2017    A1C 6.2 09/06/2016    A1C 6.0 02/04/2016

## 2019-06-19 ENCOUNTER — APPOINTMENT (OUTPATIENT)
Dept: LAB | Facility: CLINIC | Age: 64
End: 2019-06-19
Attending: OBSTETRICS & GYNECOLOGY
Payer: COMMERCIAL

## 2019-06-19 ENCOUNTER — ONCOLOGY VISIT (OUTPATIENT)
Dept: ONCOLOGY | Facility: CLINIC | Age: 64
End: 2019-06-19
Attending: OBSTETRICS & GYNECOLOGY
Payer: COMMERCIAL

## 2019-06-19 VITALS
OXYGEN SATURATION: 96 % | BODY MASS INDEX: 36.13 KG/M2 | DIASTOLIC BLOOD PRESSURE: 73 MMHG | SYSTOLIC BLOOD PRESSURE: 108 MMHG | TEMPERATURE: 98.9 F | WEIGHT: 207.23 LBS | HEART RATE: 96 BPM | RESPIRATION RATE: 16 BRPM

## 2019-06-19 DIAGNOSIS — Z85.43 PERSONAL HISTORY OF OVARIAN CANCER: ICD-10-CM

## 2019-06-19 PROCEDURE — G0463 HOSPITAL OUTPT CLINIC VISIT: HCPCS | Mod: ZF

## 2019-06-19 PROCEDURE — 36591 DRAW BLOOD OFF VENOUS DEVICE: CPT

## 2019-06-19 PROCEDURE — 25000128 H RX IP 250 OP 636: Mod: ZF | Performed by: OBSTETRICS & GYNECOLOGY

## 2019-06-19 PROCEDURE — 86304 IMMUNOASSAY TUMOR CA 125: CPT | Performed by: NURSE PRACTITIONER

## 2019-06-19 PROCEDURE — 99214 OFFICE O/P EST MOD 30 MIN: CPT | Mod: ZP | Performed by: OBSTETRICS & GYNECOLOGY

## 2019-06-19 RX ORDER — HEPARIN SODIUM (PORCINE) LOCK FLUSH IV SOLN 100 UNIT/ML 100 UNIT/ML
5 SOLUTION INTRAVENOUS EVERY 8 HOURS
Status: DISCONTINUED | OUTPATIENT
Start: 2019-06-19 | End: 2019-06-24 | Stop reason: HOSPADM

## 2019-06-19 RX ADMIN — HEPARIN 5 ML: 100 SYRINGE at 15:16

## 2019-06-19 ASSESSMENT — PAIN SCALES - GENERAL: PAINLEVEL: NO PAIN (0)

## 2019-06-19 NOTE — NURSING NOTE
"Oncology Rooming Note    June 19, 2019 3:29 PM   Hafsa Corona is a 64 year old female who presents for:    Chief Complaint   Patient presents with     Port Draw     accessed with power needle. heparin locked, vitals checked     Oncology Clinic Visit     Return Ovarian Ca     Initial Vitals: /73   Pulse 96   Temp 98.9  F (37.2  C)   Resp 16   Wt 94 kg (207 lb 3.7 oz)   LMP 01/01/2009   SpO2 96%   BMI 36.13 kg/m   Estimated body mass index is 36.13 kg/m  as calculated from the following:    Height as of 3/15/19: 1.613 m (5' 3.5\").    Weight as of this encounter: 94 kg (207 lb 3.7 oz). Body surface area is 2.05 meters squared.  No Pain (0) Comment: Data Unavailable   Patient's last menstrual period was 01/01/2009.  Allergies reviewed: Yes  Medications reviewed: Yes    Medications: Medication refills not needed today.  Pharmacy name entered into adaffix: [id number] - [place], [place] - [address] AT [facility] [address]    Clinical concerns: 3 month check up; vaginal tablet discussion      Nevaeh Sarabia CMA              "

## 2019-06-19 NOTE — NURSING NOTE
Chief Complaint   Patient presents with     Port Draw     accessed with power needle. heparin locked, vitals checked     Hailee Chase RN on 6/19/2019 at 3:18 PM

## 2019-06-19 NOTE — LETTER
2019       RE: Hafsa Corona  [address]  [place] [address]     Dear Colleague,    Thank you for referring your patient, Hafsa Corona, to the [facility]. Please see a copy of my visit note below.                Follow Up Notes on Referred Patient    Date: 2019       Dr. Referred Self, MD  No address on file       RE: Hafsa Corona  : 1955  CLIFF: 2019    Dear Dr. 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                       The patient presents today for followup.  She has been doing well. She feels better overall. No nausea or vomiting, fever or chills.  Normal urinary and bowel function.  No vaginal bleeding.  No B symptoms.     Past Medical History:    Past Medical History:   Diagnosis Date     Anxiety state, unspecified     [id number]     Arthritis     vague, gradual, not treated really, knees feel it     Attention deficit disorder without mention of hyperactivity      Cancer (H) 2017 and 2018    ovarian and kidney cancers, both treated well     Chronic rhinitis      Depressive disorder lifetime    plus Anxiety plus ADD. Escitalipram, therapy, ADD meds maybe     Depressive disorder, not elsewhere classified      Heart disease     tachycardia and high cholesterol, managed     History of blood transfusion 2017    while in hospital for ovarian cancer     Hypertension 1999 BP was too LOW last week. past 12 years Generally OK     Panic disorder without agoraphobia      Pure hypercholesterolemia     Lipitor     Tachycardia      Tachycardia, unspecified     1999-2004     Type II or unspecified type diabetes mellitus without mention of complication, not stated as uncontrolled     diagnosed          Past Surgical History:    Past Surgical History:   Procedure Laterality Date     AS REMV KIDNEY,RADICAL Right      BIOPSY  2017 and 2018    ovarian and kidney  cancer biopsies. also 1987 breast benign     BREAST SURGERY  1987    date unsure. benign breast cyst removed     CATARACT IOL, RT/LT Left 05/18/2018     CATARACT IOL, RT/LT Right 07/1318     COLONOSCOPY  2008 and 2013    polyps removed. Next due fall 2018     HYSTERECTOMY TOTAL ABDOMINAL, BILATERAL SALPINGO-OOPHORECTOMY, NODE DISSECTION, COMBINED Left 7/7/2017    Procedure: COMBINED HYSTERECTOMY TOTAL ABDOMINAL, SALPINGO-OOPHORECTOMY, NODE DISSECTION;  Exploratory Laparotomy, Left Salpingo-Oophorectomy, Cancer Staging, Total Hysterectomy, Omentectomy, Evacuation of abdominal fluid, Lymph Node Dissection  Anesthesia Block ;  Surgeon: Serena Cole MD;  Location: [facility] OR     HYSTERECTOMY, PAP NO LONGER INDICATED  07/07/2017    Laparotomy; for ovarian cancer staging     HYSTERECTOMY, PAP NO LONGER INDICATED       INSERT PORT VASCULAR ACCESS Right 8/21/2017    Procedure: INSERT PORT VASCULAR ACCESS;  Single Lumen Chest Power Port;  Surgeon: Stephen Mike PA-C;  Location: [facility] OR     LYMPHADENECTOMY RETROPERITONEAL Bilateral 07/07/2017    Laparotomy; pelvics & para-aortics; for ovarian cancer staging     OMENTECTOMY  07/07/2017    Laparotomy; for ovarian cancer staging     ORTHOPEDIC SURGERY  1/2002    broken left jaw due to fall, 4 fractures, titanium plates     PHACOEMULSIFICATION CLEAR CORNEA WITH STANDARD INTRAOCULAR LENS IMPLANT Right 7/13/2018    Procedure: PHACOEMULSIFICATION CLEAR CORNEA WITH STANDARD INTRAOCULAR LENS IMPLANT;  RIght Eye Phacoemulsification Clear Cornea with Standard Intraocular Lens Placement ;  Surgeon: Mary Jo Massey MD;  Location: [facility] OR     PHACOEMULSIFICATION CLEAR CORNEA WITH TORIC INTRAOCULAR LENS IMPLANT Left 5/18/2018    Procedure: PHACOEMULSIFICATION CLEAR CORNEA WITH TORIC INTRAOCULAR LENS IMPLANT;  Left Eye Phacoemulsification with Toric Lens;  Surgeon: Mary Jo Massey MD;  Location: [facility] OR     SALPINGO OOPHORECTOMY,R/L/KAYY Right 2008    Salpingo Oophorectomy, RT      SALPINGO OOPHORECTOMY,R/L/KAYY Left 07/07/2017    Laparotomy; for ovarian cancer staging     SURGICAL HISTORY OF -       facial surgery d/t fall in 1/2002     SURGICAL HISTORY OF -       1985 removal of breast cyst     SURGICAL HISTORY OF -   2008    endometrial ablation     YAG CAPSULOTOMY OD (RIGHT EYE)  10/22/2018         Health Maintenance Due   Topic Date Due     DEPRESSION ACTION PLAN  1955     HIV SCREENING  03/09/1969     ZOSTER IMMUNIZATION (1 of 2) 03/09/2004     PHQ-9  03/06/2017     TSH W/FREE T4 REFLEX  02/04/2018     DIABETIC FOOT EXAM  04/11/2018     LIPID  11/01/2018     A1C  11/10/2018     COLONOSCOPY  11/22/2018     MEDICARE ANNUAL WELLNESS VISIT  03/09/2019     FALL RISK ASSESSMENT  03/09/2019     MICROALBUMIN  07/09/2019       Current Medications:     Current Outpatient Medications   Medication Sig Dispense Refill     Acetaminophen (TYLENOL PO) Take 500 mg by mouth 2 tabs prn pain (monthly)       atorvastatin (LIPITOR) 80 MG tablet TAKE 1 TABLET(80 MG) BY MOUTH DAILY 90 tablet PRN     B Complex Vitamins (VITAMIN B-COMPLEX PO) Take 50 mg by mouth        CALCIUM-MAGNESIUM-VITAMIN D PO Take 2 tablets by mouth daily       Cholecalciferol (VITAMIN D) 1000 UNIT capsule Take 2,000 Units by mouth daily        DiphenhydrAMINE HCl (BENADRYL PO) Take 50 mg by mouth daily as needed (monthly)       escitalopram (LEXAPRO) 20 MG tablet Take 1 tablet (20 mg) by mouth daily 90 tablet 3     estradiol (VAGIFEM) 10 MCG TABS vaginal tablet Place 1 tablet (10 mcg) vaginally twice a week 15 tablet 1     fluticasone (FLONASE) 50 MCG/ACT spray SHAKE WELL AND USE 2 SPRAYS IN EACH NOSTRIL DAILY 48 g 3     lisinopril (PRINIVIL/ZESTRIL) 5 MG tablet Take 1 tablet (5 mg) by mouth daily 90 tablet 1     LORazepam (ATIVAN) 1 MG tablet Take 1 tablet (1 mg) by mouth every 6 hours as needed (nausea/vomiting, anxiety or sleep) 30 tablet 1     Magnesium Hydroxide (MAGNESIA PO) Take 500 mg by mouth       metFORMIN (GLUCOPHAGE-XR)  500 MG 24 hr tablet TAKE 2 TABLETS BY MOUTH EVERY DAY WITH THE EVENING MEAL 180 tablet 0     Multiple Vitamins-Minerals (MULTIVITAMIN ADULT PO) Take 1 tablet by mouth daily       nitroFURantoin macrocrystal-monohydrate (MACROBID) 100 MG capsule TK 1 C PO AFTER INTERCOUSE D PRN  0     Probiotic Product (PRO-BIOTIC BLEND PO) Take 1 capsule by mouth daily Enzymatic Therapy-probiotic pearls       traZODone (DESYREL) 50 MG tablet TAKE 1 TO 2 TABLETS(50  MG) BY MOUTH EVERY NIGHT AS NEEDED FOR SLEEP 180 tablet 1     blood glucose (NO BRAND SPECIFIED) lancets standard Use to test blood sugar 2 times daily or as directed. 100 each 11     blood glucose monitoring (NO BRAND SPECIFIED) meter device kit Use to test blood sugar 2 times daily or as directed. 1 kit 0     blood glucose monitoring (NO BRAND SPECIFIED) test strip Use to test blood sugars 2 times daily or as directed 100 strip PRN     EPINEPHrine (EPIPEN/ADRENACLICK/OR ANY BX GENERIC EQUIV) 0.3 MG/0.3ML injection 2-pack Inject 0.3 mLs (0.3 mg) into the muscle once as needed for anaphylaxis (Patient not taking: Reported on 3/15/2019) 0.3 mL PRN     lisdexamfetamine (VYVANSE) 50 MG capsule Take 50 mg by mouth           Allergies:        Allergies   Allergen Reactions     Paclitaxel Anaphylaxis and Difficulty breathing     Wasps [Hornets] Anaphylaxis     Yellow Hornet Venom [Hornet Venom] Anaphylaxis and Swelling     Yellow Jacket Venom [Venomil Honey Bee] Anaphylaxis and Swelling     Sulfa Drugs Hives and Rash        Social History:     Social History     Tobacco Use     Smoking status: Never Smoker     Smokeless tobacco: Never Used   Substance Use Topics     Alcohol use: Yes     Comment: Very infrequent, a bit of wine or beer 2x per month maybe       History   Drug Use     Types: Marijuana     Comment: infrequent, for celebrations only, maybe 8x per year         Family History:       Family History   Problem Relation Age of Onset     C.A.D. Father      Diabetes  Father         Later in his life, in his 70s     Hypertension Father      Cerebrovascular Disease Father         1984 or      Psychotic Disorder Father      Pancreatic Cancer Father      Coronary Artery Disease Father         diagnosed in his late 50s (age)     Hyperlipidemia Father         treated w statins     Other Cancer Father         pancreatic, ,  of this     Depression Father      Mental Illness Father         likely PTSD and depression     Obesity Father      C.A.D. Mother      Hypertension Mother      Breast Cancer Mother         2 times,  and ?     Psychotic Disorder Mother      Colon Cancer Mother         ?     Cancer Mother         Bone cancer     Coronary Artery Disease Mother         diagnosed in her late 70s (age)     Hyperlipidemia Mother         treated w. statins     Other Cancer Mother         bone/marrow, ,  of this     Depression Mother      Anxiety Disorder Mother      Mental Illness Mother         various undiagnosed types, but THERE     Obesity Mother      Prostate Problems Brother         cleared      Hypertension Brother      Hyperlipidemia Brother         unsure if treated w statins     Depression Brother      Anxiety Disorder Brother      Mental Illness Brother         undiagnosed but THERE     Obesity Brother      Psychotic Disorder Sister         x2     Neurologic Disorder Sister      Hypertension Sister      Hyperlipidemia Sister         treated w statins     Depression Sister      Anxiety Disorder Sister      Obesity Sister      Psychotic Disorder Brother         x2     Depression Brother         major depressive disorder and OCD     Anxiety Disorder Brother      Mental Illness Brother         not sure of diagnoses, treated     Hypertension Brother      Hyperlipidemia Brother      Psychotic Disorder Maternal Grandmother         ?     Coronary Artery Disease Maternal Grandmother          of heart attack age 84?     Depression Maternal  Grandmother      Psychotic Disorder Maternal Grandfather         ?     Psychotic Disorder Paternal Grandmother         Schizophernia     Coronary Artery Disease Paternal Grandmother          of heart attack, age 85?     Depression Paternal Grandmother         severe, but recovered     Mental Illness Paternal Grandmother         possible bipolar or other     Psychotic Disorder Paternal Grandfather         ?     Respiratory Paternal Grandfather          of emphysema and smoking     Psychotic Disorder Sister      Other - See Comments Sister         small kidney stone      Hypertension Sister      Hyperlipidemia Sister         treated w statins     Depression Sister      Anxiety Disorder Sister      Cancer - colorectal No family hx of      Glaucoma No family hx of      Macular Degeneration No family hx of          Physical Exam:     /73   Pulse 96   Temp 98.9  F (37.2  C)   Resp 16   Wt 94 kg (207 lb 3.7 oz)   LMP 2009   SpO2 96%   BMI 36.13 kg/m     Body mass index is 36.13 kg/m .    General Appearance:  healthy and alert, no distress     IMMUNOLOGIC:  No cervical, supra-, infraclavicular, axillary or inguinal lymphadenopathy.     ABDOMEN:  Soft, nontender and nondistended.  No organomegaly.  Well-healing midline incisions.     PELVIC:  Normal external genitalia.  Well-healed vaginal cuff.  No adnexal masses or tenderness.  Rectovaginal confirms.                  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      A total of  25 minutes was spent with the patient, 20 minutes of which were spent in counseling the patient and/or treatment planning.        1.  Stage IC2 clear cell carcinoma of the ovary.   2.   Benign renal mass.   3.   of 13.      Discussed with the patient that she has a normal .  No evidence of ovarian cancer recurrence. We  will see her back for ovarian cancer surveillance every 3-4 months for  the first year, then every 6 months and then yearly from then on when she gets to year 4.  If this happened to be ovarian cancer recurrence, which is less likely especially as this is retroperitoneal, we will see her back for treatment.  Otherwise, we will see her for surveillance.  The patient agrees with the plan.  She is very appreciative of her care.  All questions were answered.         Jarod Shaffer MD, MS    Department of Obstetrics and Gynecology   Division of Gynecologic Oncology   [facility]  Phone: [phone]      CC  Patient Care Team:  Morelia Ayon NP as PCP - General  Karla Oswald RN as Continuity Care Coordinator (Gyn-Onc)  Nabeel Dick MD as MD (Urology)  Javier Gregorio MD as MD (Urology)  Lola Vasquez, PhD LP as Psychologist (Psychology)  Diana Emmanuel MD as Assigned PCP      Again, thank you for allowing me to participate in the care of your patient.      Sincerely,    Jarod Shaffer MD

## 2019-06-19 NOTE — PATIENT INSTRUCTIONS
Colorectal Cancer Screening: During our visit today, we discussed that it is recommended you receive colorectal cancer screening. Please call or make an appointment with your primary care provider to discuss this. You may also call the [facility] scheduling line ([phone]) to set up a colonoscopy appointment.

## 2019-06-20 LAB — CANCER AG125 SERPL-ACNC: 13 U/ML (ref 0–30)

## 2019-06-24 NOTE — PROGRESS NOTES
Follow Up Notes on Referred Patient    Date: 2019       Dr. Referred Self, MD  No address on file       RE: Hafsa Corona  : 1955  CLIFF: 2019    Dear Dr. 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                       The patient presents today for followup.  She has been doing well. She feels better overall. No nausea or vomiting, fever or chills.  Normal urinary and bowel function.  No vaginal bleeding.  No B symptoms.     Past Medical History:    Past Medical History:   Diagnosis Date     Anxiety state, unspecified     [id number]     Arthritis     vague, gradual, not treated really, knees feel it     Attention deficit disorder without mention of hyperactivity      Cancer (H) 2017 and 2018    ovarian and kidney cancers, both treated well     Chronic rhinitis      Depressive disorder lifetime    plus Anxiety plus ADD. Escitalipram, therapy, ADD meds maybe     Depressive disorder, not elsewhere classified      Heart disease     tachycardia and high cholesterol, managed     History of blood transfusion 2017    while in hospital for ovarian cancer     Hypertension     tho BP was too LOW last week. past 12 years Generally OK     Panic disorder without agoraphobia      Pure hypercholesterolemia     Lipitor     Tachycardia      Tachycardia, unspecified     1999-2004     Type II or unspecified type diabetes mellitus without mention of complication, not stated as uncontrolled     diagnosed          Past Surgical History:    Past Surgical History:   Procedure Laterality Date     AS REMV KIDNEY,RADICAL Right      BIOPSY  2017 and 2018    ovarian and kidney cancer biopsies. also  breast benign     BREAST SURGERY      date unsure. benign breast cyst removed     CATARACT IOL, RT/LT Left 2018     CATARACT IOL, RT/LT Right 1318     COLONOSCOPY   and     polyps removed.  Next due fall 2018     HYSTERECTOMY TOTAL ABDOMINAL, BILATERAL SALPINGO-OOPHORECTOMY, NODE DISSECTION, COMBINED Left 7/7/2017    Procedure: COMBINED HYSTERECTOMY TOTAL ABDOMINAL, SALPINGO-OOPHORECTOMY, NODE DISSECTION;  Exploratory Laparotomy, Left Salpingo-Oophorectomy, Cancer Staging, Total Hysterectomy, Omentectomy, Evacuation of abdominal fluid, Lymph Node Dissection  Anesthesia Block ;  Surgeon: Serena Cole MD;  Location: [facility] OR     HYSTERECTOMY, PAP NO LONGER INDICATED  07/07/2017    Laparotomy; for ovarian cancer staging     HYSTERECTOMY, PAP NO LONGER INDICATED       INSERT PORT VASCULAR ACCESS Right 8/21/2017    Procedure: INSERT PORT VASCULAR ACCESS;  Single Lumen Chest Power Port;  Surgeon: Stephen Mike PA-C;  Location: [facility] OR     LYMPHADENECTOMY RETROPERITONEAL Bilateral 07/07/2017    Laparotomy; pelvics & para-aortics; for ovarian cancer staging     OMENTECTOMY  07/07/2017    Laparotomy; for ovarian cancer staging     ORTHOPEDIC SURGERY  1/2002    broken left jaw due to fall, 4 fractures, titanium plates     PHACOEMULSIFICATION CLEAR CORNEA WITH STANDARD INTRAOCULAR LENS IMPLANT Right 7/13/2018    Procedure: PHACOEMULSIFICATION CLEAR CORNEA WITH STANDARD INTRAOCULAR LENS IMPLANT;  RIght Eye Phacoemulsification Clear Cornea with Standard Intraocular Lens Placement ;  Surgeon: Mary Jo Massey MD;  Location: [facility] OR     PHACOEMULSIFICATION CLEAR CORNEA WITH TORIC INTRAOCULAR LENS IMPLANT Left 5/18/2018    Procedure: PHACOEMULSIFICATION CLEAR CORNEA WITH TORIC INTRAOCULAR LENS IMPLANT;  Left Eye Phacoemulsification with Toric Lens;  Surgeon: Mary Jo Massey MD;  Location: [facility] OR     SALPINGO OOPHORECTOMY,R/L/KAYY Right 2008    Salpingo Oophorectomy, RT     SALPINGO OOPHORECTOMY,R/L/KAYY Left 07/07/2017    Laparotomy; for ovarian cancer staging     SURGICAL HISTORY OF -       facial surgery d/t fall in 1/2002     SURGICAL HISTORY OF -       1985 removal of breast cyst     SURGICAL HISTORY  OF - 2008    endometrial ablation     YAG CAPSULOTOMY OD (RIGHT EYE)  10/22/2018         Health Maintenance Due   Topic Date Due     DEPRESSION ACTION PLAN  1955     HIV SCREENING  03/09/1969     ZOSTER IMMUNIZATION (1 of 2) 03/09/2004     PHQ-9  03/06/2017     TSH W/FREE T4 REFLEX  02/04/2018     DIABETIC FOOT EXAM  04/11/2018     LIPID  11/01/2018     A1C  11/10/2018     COLONOSCOPY  11/22/2018     MEDICARE ANNUAL WELLNESS VISIT  03/09/2019     FALL RISK ASSESSMENT  03/09/2019     MICROALBUMIN  07/09/2019       Current Medications:     Current Outpatient Medications   Medication Sig Dispense Refill     Acetaminophen (TYLENOL PO) Take 500 mg by mouth 2 tabs prn pain (monthly)       atorvastatin (LIPITOR) 80 MG tablet TAKE 1 TABLET(80 MG) BY MOUTH DAILY 90 tablet PRN     B Complex Vitamins (VITAMIN B-COMPLEX PO) Take 50 mg by mouth        CALCIUM-MAGNESIUM-VITAMIN D PO Take 2 tablets by mouth daily       Cholecalciferol (VITAMIN D) 1000 UNIT capsule Take 2,000 Units by mouth daily        DiphenhydrAMINE HCl (BENADRYL PO) Take 50 mg by mouth daily as needed (monthly)       escitalopram (LEXAPRO) 20 MG tablet Take 1 tablet (20 mg) by mouth daily 90 tablet 3     estradiol (VAGIFEM) 10 MCG TABS vaginal tablet Place 1 tablet (10 mcg) vaginally twice a week 15 tablet 1     fluticasone (FLONASE) 50 MCG/ACT spray SHAKE WELL AND USE 2 SPRAYS IN EACH NOSTRIL DAILY 48 g 3     lisinopril (PRINIVIL/ZESTRIL) 5 MG tablet Take 1 tablet (5 mg) by mouth daily 90 tablet 1     LORazepam (ATIVAN) 1 MG tablet Take 1 tablet (1 mg) by mouth every 6 hours as needed (nausea/vomiting, anxiety or sleep) 30 tablet 1     Magnesium Hydroxide (MAGNESIA PO) Take 500 mg by mouth       metFORMIN (GLUCOPHAGE-XR) 500 MG 24 hr tablet TAKE 2 TABLETS BY MOUTH EVERY DAY WITH THE EVENING MEAL 180 tablet 0     Multiple Vitamins-Minerals (MULTIVITAMIN ADULT PO) Take 1 tablet by mouth daily       nitroFURantoin macrocrystal-monohydrate (MACROBID) 100  MG capsule TK 1 C PO AFTER INTERCOUSE D PRN  0     Probiotic Product (PRO-BIOTIC BLEND PO) Take 1 capsule by mouth daily Enzymatic Therapy-probiotic pearls       traZODone (DESYREL) 50 MG tablet TAKE 1 TO 2 TABLETS(50  MG) BY MOUTH EVERY NIGHT AS NEEDED FOR SLEEP 180 tablet 1     blood glucose (NO BRAND SPECIFIED) lancets standard Use to test blood sugar 2 times daily or as directed. 100 each 11     blood glucose monitoring (NO BRAND SPECIFIED) meter device kit Use to test blood sugar 2 times daily or as directed. 1 kit 0     blood glucose monitoring (NO BRAND SPECIFIED) test strip Use to test blood sugars 2 times daily or as directed 100 strip PRN     EPINEPHrine (EPIPEN/ADRENACLICK/OR ANY BX GENERIC EQUIV) 0.3 MG/0.3ML injection 2-pack Inject 0.3 mLs (0.3 mg) into the muscle once as needed for anaphylaxis (Patient not taking: Reported on 3/15/2019) 0.3 mL PRN     lisdexamfetamine (VYVANSE) 50 MG capsule Take 50 mg by mouth           Allergies:        Allergies   Allergen Reactions     Paclitaxel Anaphylaxis and Difficulty breathing     Wasps [Hornets] Anaphylaxis     Yellow Hornet Venom [Hornet Venom] Anaphylaxis and Swelling     Yellow Jacket Venom [Venomil Honey Bee] Anaphylaxis and Swelling     Sulfa Drugs Hives and Rash        Social History:     Social History     Tobacco Use     Smoking status: Never Smoker     Smokeless tobacco: Never Used   Substance Use Topics     Alcohol use: Yes     Comment: Very infrequent, a bit of wine or beer 2x per month maybe       History   Drug Use     Types: Marijuana     Comment: infrequent, for celebrations only, maybe 8x per year         Family History:       Family History   Problem Relation Age of Onset     C.A.D. Father      Diabetes Father         Later in his life, in his 70s     Hypertension Father      Cerebrovascular Disease Father         March 1984 or 85     Psychotic Disorder Father      Pancreatic Cancer Father      Coronary Artery Disease Father          diagnosed in his late 50s (age)     Hyperlipidemia Father         treated w statins     Other Cancer Father         pancreatic, ,  of this     Depression Father      Mental Illness Father         likely PTSD and depression     Obesity Father      C.A.D. Mother      Hypertension Mother      Breast Cancer Mother         2 times,  and ?     Psychotic Disorder Mother      Colon Cancer Mother         ?     Cancer Mother         Bone cancer     Coronary Artery Disease Mother         diagnosed in her late 70s (age)     Hyperlipidemia Mother         treated w. statins     Other Cancer Mother         bone/marrow, ,  of this     Depression Mother      Anxiety Disorder Mother      Mental Illness Mother         various undiagnosed types, but THERE     Obesity Mother      Prostate Problems Brother         cleared      Hypertension Brother      Hyperlipidemia Brother         unsure if treated w statins     Depression Brother      Anxiety Disorder Brother      Mental Illness Brother         undiagnosed but THERE     Obesity Brother      Psychotic Disorder Sister         x2     Neurologic Disorder Sister      Hypertension Sister      Hyperlipidemia Sister         treated w statins     Depression Sister      Anxiety Disorder Sister      Obesity Sister      Psychotic Disorder Brother         x2     Depression Brother         major depressive disorder and OCD     Anxiety Disorder Brother      Mental Illness Brother         not sure of diagnoses, treated     Hypertension Brother      Hyperlipidemia Brother      Psychotic Disorder Maternal Grandmother         ?     Coronary Artery Disease Maternal Grandmother          of heart attack age 84?     Depression Maternal Grandmother      Psychotic Disorder Maternal Grandfather         ?     Psychotic Disorder Paternal Grandmother         Schizophernia     Coronary Artery Disease Paternal Grandmother          of heart attack, age 85?     Depression Paternal  Grandmother         severe, but recovered     Mental Illness Paternal Grandmother         possible bipolar or other     Psychotic Disorder Paternal Grandfather         ?     Respiratory Paternal Grandfather          of emphysema and smoking     Psychotic Disorder Sister      Other - See Comments Sister         small kidney stone      Hypertension Sister      Hyperlipidemia Sister         treated w statins     Depression Sister      Anxiety Disorder Sister      Cancer - colorectal No family hx of      Glaucoma No family hx of      Macular Degeneration No family hx of          Physical Exam:     /73   Pulse 96   Temp 98.9  F (37.2  C)   Resp 16   Wt 94 kg (207 lb 3.7 oz)   LMP 2009   SpO2 96%   BMI 36.13 kg/m    Body mass index is 36.13 kg/m .    General Appearance:  healthy and alert, no distress     IMMUNOLOGIC:  No cervical, supra-, infraclavicular, axillary or inguinal lymphadenopathy.     ABDOMEN:  Soft, nontender and nondistended.  No organomegaly.  Well-healing midline incisions.     PELVIC:  Normal external genitalia.  Well-healed vaginal cuff.  No adnexal masses or tenderness.  Rectovaginal confirms.                  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      A total of 25 minutes was spent with the patient, 20 minutes of which were spent in counseling the patient and/or treatment planning.        1.  Stage IC2 clear cell carcinoma of the ovary.   2.  Benign renal mass.   3.   of 13.      Discussed with the patient that she has a normal .  No evidence of ovarian cancer recurrence. We will see her back for ovarian cancer surveillance every 3-4 months for the first year, then every 6 months and then yearly from then on when she gets to year 5.  If this happened to be ovarian cancer recurrence, which is less likely especially as this is retroperitoneal, we will see her back for treatment.   Otherwise, we will see her for surveillance.  The patient agrees with the plan.  She is very appreciative of her care.  All questions were answered.         Jarod Shaffer MD, MS    Department of Obstetrics and Gynecology   Division of Gynecologic Oncology   [facility]  Phone: [phone]      CC  Patient Care Team:  Morelia Ayon NP as PCP - General  Morelia Ayon NP as Assigned PCP  Karla Oswald RN as Continuity Care Coordinator (Gyn-Onc)  Nabeel Dick MD as MD (Urology)  Javier Gregorio MD as MD (Urology)  Lola Vasquez, PhD LP as Psychologist (Psychology)  Diana Emmanuel MD as Assigned PCP  SELF, REFERRED

## 2019-07-07 DIAGNOSIS — E78.5 HYPERLIPIDEMIA LDL GOAL <100: ICD-10-CM

## 2019-07-07 RX ORDER — ATORVASTATIN CALCIUM 80 MG/1
TABLET, FILM COATED ORAL
Qty: 30 TABLET | Refills: 0 | Status: SHIPPED | OUTPATIENT
Start: 2019-07-07 | End: 2019-07-07

## 2019-07-07 NOTE — TELEPHONE ENCOUNTER
"Requested Prescriptions   Pending Prescriptions Disp Refills     atorvastatin (LIPITOR) 80 MG tablet [Pharmacy Med Name: ATORVASTATIN 80MG TABLETS] 90 tablet 0     Sig: TAKE 1 TABLET(80 MG) BY MOUTH DAILY       Statins Protocol Failed - 7/7/2019  3:33 AM        Failed - LDL on file in past 12 months     Recent Labs   Lab Test 11/01/17  1457   LDL 70             Passed - No abnormal creatine kinase in past 12 months     No lab results found.              Passed - Recent (12 mo) or future (30 days) visit within the authorizing provider's specialty     Patient had office visit in the last 12 months or has a visit in the next 30 days with authorizing provider or within the authorizing provider's specialty.  See \"Patient Info\" tab in inbasket, or \"Choose Columns\" in Meds & Orders section of the refill encounter.              Passed - Medication is active on med list        Passed - Patient is age 18 or older        Passed - No active pregnancy on record        Passed - No positive pregnancy test in past 12 months        Medication is being filled for 1 time refill only due to:  Patient needs labs FLP. Future labs ordered FLP.     Signed Prescriptions:                        Disp   Refills    atorvastatin (LIPITOR) 80 MG tablet        30 tab*0        Sig: TAKE 1 TABLET(80 MG) BY MOUTH DAILY  Authorizing Provider: WHIT ACEVEDO  Ordering User: ZACK GARCIA Reception - one month due for fasting lab work please    "

## 2019-07-07 NOTE — TELEPHONE ENCOUNTER
"Requested Prescriptions   Pending Prescriptions Disp Refills     atorvastatin (LIPITOR) 80 MG tablet [Pharmacy Med Name: ATORVASTATIN 80MG TABLETS] 90 tablet 0     Sig: TAKE 1 TABLET BY MOUTH EVERY DAY   Routing refill request to provider for review/approval because:  Patient overdue for labs and pharmacy pushing back          Statins Protocol Failed - 7/7/2019  2:04 PM        Failed - LDL on file in past 12 months     Recent Labs   Lab Test 11/01/17  1457   LDL 70             Passed - No abnormal creatine kinase in past 12 months     No lab results found.             Passed - Recent (12 mo) or future (30 days) visit within the authorizing provider's specialty     Patient had office visit in the last 12 months or has a visit in the next 30 days with authorizing provider or within the authorizing provider's specialty.  See \"Patient Info\" tab in inbasket, or \"Choose Columns\" in Meds & Orders section of the refill encounter.              Passed - Medication is active on med list        Passed - Patient is age 18 or older        Passed - No active pregnancy on record        Passed - No positive pregnancy test in past 12 months          "

## 2019-07-08 RX ORDER — ATORVASTATIN CALCIUM 80 MG/1
TABLET, FILM COATED ORAL
Qty: 90 TABLET | Refills: 0 | Status: SHIPPED | OUTPATIENT
Start: 2019-07-08 | End: 2019-10-01

## 2019-07-08 NOTE — TELEPHONE ENCOUNTER
Patient due for lab only visit. [facility] message sent to patient.   Patient informed that a medication refill was sent to their pharmacy.         Will await to see if patient reads [employer] [facility] message.        Kassidy DARLING     [facility]

## 2019-08-06 ENCOUNTER — TELEPHONE (OUTPATIENT)
Dept: FAMILY MEDICINE | Facility: CLINIC | Age: 64
End: 2019-08-06

## 2019-08-06 NOTE — TELEPHONE ENCOUNTER
Prior Authorization Retail Medication Request    Medication/Dose: nitroFURantoin macrocrystal-monohydrate (MACROBID) 100 MG capsule  ICD code (if different than what is on RX):  Previously Tried and Failed:  Rationale:    Insurance Name:    Insurance ID: [id number]    Pharmacy Information (if different than what is on RX)  Name:  Phone:    Please include previous medications tried and failed.  Please ask insurance for medications on formulary.

## 2019-08-31 DIAGNOSIS — E11.9 TYPE 2 DIABETES MELLITUS WITHOUT COMPLICATION, WITHOUT LONG-TERM CURRENT USE OF INSULIN (H): ICD-10-CM

## 2019-08-31 NOTE — LETTER
[address] [address]  Phone: [phone]    09/05/19    Hafsa Corona  [address]  [place] [address]      To whom it may concern:     In order to ensure we are providing the best quality care, we have reviewed your chart and see that you are due for a fasting lab appointment for further refills.    We have also sent your metFORMIN (GLUCOPHAGE-XR) 500 MG 24 hr tablet medication to your pharmacy. For future medication refills, please contact your primary care clinic to schedule the above appointment. This can be requested via [employer] or by calling the clinic at [phone].    We greatly appreciate the opportunity to serve you. Thank you for trusting us with your health care.      Sincerely,    Your care team at [facility]

## 2019-08-31 NOTE — TELEPHONE ENCOUNTER
Requested Prescriptions   Pending Prescriptions Disp Refills     metFORMIN (GLUCOPHAGE-XR) 500 MG 24 hr tablet [Pharmacy Med Name: METFORMIN ER 500MG 24HR TABS]  Last Written Prescription Date:  6/6/2019  Last Fill Quantity: 180 tabs,  # refills: 0   Last office visit: 4/26/2019 with prescribing provider:  Nayn   Future Office Visit:     180 tablet 0     Sig: TAKE 2 TABLETS BY MOUTH EVERY DAY WITH THE EVENING MEAL       Biguanide Agents Failed - 8/31/2019  3:44 AM        Failed - Patient has documented LDL within the past 12 mos.     Recent Labs   Lab Test 11/01/17  1457   LDL 70             Failed - Patient has documented A1c within the specified period of time.     If HgbA1C is 8 or greater, it needs to be on file within the past 3 months.  If less than 8, must be on file within the past 6 months.     Recent Labs   Lab Test 05/10/18  1558   A1C 5.6             Passed - Blood pressure less than 140/90 in past 6 months     BP Readings from Last 3 Encounters:   06/19/19 108/73   04/26/19 110/60   03/15/19 113/78                 Passed - Patient has had a Microalbumin in the past 15 mos.     Recent Labs   Lab Test 07/09/18  1359   MICROL <5   UMALCR Unable to calculate due to low value             Passed - Patient is age 10 or older        Passed - Patient's CR is NOT>1.4 OR Patient's EGFR is NOT<45 within past 12 mos.     Recent Labs   Lab Test 01/29/19  1608   GFRESTIMATED 56*   GFRESTBLACK 65       Recent Labs   Lab Test 01/29/19  1608   CR 1.05*             Passed - Patient does NOT have a diagnosis of CHF.        Passed - Medication is active on med list        Passed - Patient is not pregnant        Passed - Patient has not had a positive pregnancy test within the past 12 mos.         Passed - Recent (6 mo) or future (30 days) visit within the authorizing provider's specialty     Patient had office visit in the last 6 months or has a visit in the next 30 days with authorizing provider or within the authorizing  "provider's specialty.  See \"Patient Info\" tab in inbasket, or \"Choose Columns\" in Meds & Orders section of the refill encounter.              "

## 2019-09-04 RX ORDER — METFORMIN HCL 500 MG
TABLET, EXTENDED RELEASE 24 HR ORAL
Qty: 60 TABLET | Refills: 0 | Status: SHIPPED | OUTPATIENT
Start: 2019-09-04 | End: 2019-10-01

## 2019-09-04 NOTE — TELEPHONE ENCOUNTER
Team coordinators-Please contact patient to schedule fasting lab appt.  Patient should fast 10 hours.  Sips of water and taking usual morning medications is fine.  Patient to please avoid drinking alcoholic beverages and eating fatty foods 24 hours before lab appt.    One month refilled.  Overdue for lipid panel.      Thank you!  CHARLENE Aden, TIMURN, RN

## 2019-09-05 NOTE — TELEPHONE ENCOUNTER
LM informing patient that RX was refilled. Patient is due for fasting lab appt prior to additional refills given. Sending letter.    Freda Mcgraw

## 2019-09-10 DIAGNOSIS — C56.9 MALIGNANT NEOPLASM OF OVARY, UNSPECIFIED LATERALITY (H): Primary | ICD-10-CM

## 2019-09-12 ENCOUNTER — OFFICE VISIT (OUTPATIENT)
Dept: HEMATOLOGY | Facility: CLINIC | Age: 64
End: 2019-09-12
Attending: INTERNAL MEDICINE
Payer: COMMERCIAL

## 2019-09-12 ENCOUNTER — OFFICE VISIT (OUTPATIENT)
Dept: URGENT CARE | Facility: URGENT CARE | Age: 64
End: 2019-09-12
Payer: COMMERCIAL

## 2019-09-12 VITALS
OXYGEN SATURATION: 94 % | SYSTOLIC BLOOD PRESSURE: 123 MMHG | DIASTOLIC BLOOD PRESSURE: 78 MMHG | WEIGHT: 205 LBS | HEART RATE: 84 BPM | HEIGHT: 64 IN | BODY MASS INDEX: 35 KG/M2 | TEMPERATURE: 97.8 F | RESPIRATION RATE: 16 BRPM

## 2019-09-12 VITALS
SYSTOLIC BLOOD PRESSURE: 127 MMHG | TEMPERATURE: 98.1 F | HEART RATE: 69 BPM | BODY MASS INDEX: 35.74 KG/M2 | DIASTOLIC BLOOD PRESSURE: 88 MMHG | OXYGEN SATURATION: 99 % | WEIGHT: 205 LBS

## 2019-09-12 DIAGNOSIS — Z86.718 PERSONAL HISTORY OF DVT (DEEP VEIN THROMBOSIS): Primary | ICD-10-CM

## 2019-09-12 DIAGNOSIS — J06.9 VIRAL URI WITH COUGH: Primary | ICD-10-CM

## 2019-09-12 PROCEDURE — 99214 OFFICE O/P EST MOD 30 MIN: CPT | Performed by: INTERNAL MEDICINE

## 2019-09-12 PROCEDURE — G0463 HOSPITAL OUTPT CLINIC VISIT: HCPCS

## 2019-09-12 PROCEDURE — 99213 OFFICE O/P EST LOW 20 MIN: CPT | Performed by: FAMILY MEDICINE

## 2019-09-12 ASSESSMENT — MIFFLIN-ST. JEOR: SCORE: 1451.93

## 2019-09-12 ASSESSMENT — PAIN SCALES - GENERAL: PAINLEVEL: MILD PAIN (2)

## 2019-09-13 NOTE — PROGRESS NOTES
Subjective:   Hafsa Corona is a 64 year old female who presents for   Chief Complaint   Patient presents with     Urgent Care     Nasal Congestion     c/o nasal congestion,fatigue and body aches for 4 days     Sick on/off since a week ago now-- this all started with feeling off and had some sore throat with swollen glands -- had to miss work the next day. Has had aches in her hands/joints/feet. Feelings of exhaustion . No recorded fevers. Poor appetite at the time , slept through most of the weekend. Slight nasal congestion. On/off sinus pressure starting yesterday. She does have a hx of getting sinus infections.   No significant cough, sneezing some. No sick contacts at this time.   Meds attempted: tylenol, neti pot, flonase      Patient Active Problem List    Diagnosis Date Noted     Abscess of left lower extremity 09/21/2018     Priority: Medium     ADD (attention deficit disorder) without hyperactivity 09/21/2018     Priority: Medium     Anxiety 09/21/2018     Priority: Medium     Depressive disorder 09/21/2018     Priority: Medium     Normocytic anemia 09/21/2018     Priority: Medium     Panic disorder with agoraphobia 09/21/2018     Priority: Medium     Personal history of DVT (deep vein thrombosis) 09/13/2018     Priority: Medium     Encounter for follow-up surveillance of ovarian cancer 08/24/2018     Priority: Medium     Solitary kidney, acquired 07/09/2018     Priority: Medium     Angiomyolipoma of kidney 05/16/2018     Priority: Medium     Renal oncocytoma 05/16/2018     Priority: Medium     Renal cell carcinoma, right (H) 05/10/2018     Priority: Medium     ACP (advance care planning) 12/15/2017     Priority: Medium     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) 09/15/2017     Priority: Medium     Pain in joint, multiple sites 09/15/2017     Priority: Medium     Ovarian cancer (H) 08/30/2017     Priority: Medium     Ovarian cancer, left (H) 08/01/2017     Priority: Medium     SCP given 8.24.18        Encounter for long-term (current) use of medications 08/01/2017     Priority: Medium     Lymphedema 07/25/2017     Priority: Medium     S/P laparotomy 07/07/2017     Priority: Medium     Long-term (current) use of anticoagulants [Z79.01] 06/06/2017     Priority: Medium     Major depressive disorder, recurrent episode, mild (H) 09/06/2016     Priority: Medium     Lumbago 04/04/2016     Priority: Medium     Essential hypertension 02/09/2016     Priority: Medium     BMI > 35 10/14/2015     Priority: Medium     Coronary atherosclerosis 10/31/2013     Priority: Medium     Overview:   + calcium score on EBCT.  No clinical history.       Heel pain 01/15/2013     Priority: Medium     Allergic rhinitis due to other allergen 12/20/2011     Priority: Medium     Dyslipidemia 11/29/2011     Priority: Medium     Mild anemia 11/29/2011     Priority: Medium     Type 2 diabetes mellitus without complication (H) 10/31/2010     Priority: Medium     HYPERLIPIDEMIA LDL GOAL <100 10/31/2010     Priority: Medium     Chronic Maxillary Sinusitis 11/21/2008     Priority: Medium     Tachycardia      Priority: Medium     Dysthymic disorder 07/22/2005     Priority: Medium     Generalized anxiety disorder 07/22/2005     Priority: Medium     Attention deficit disorder 07/19/2005     Priority: Medium     Problem list name updated by automated process. Provider to review       PANIC DISORDER  07/19/2005     Priority: Medium     VASOMOTOR RHINITIS 07/19/2005     Priority: Medium       Current Outpatient Medications   Medication     Acetaminophen (TYLENOL PO)     atorvastatin (LIPITOR) 80 MG tablet     atorvastatin (LIPITOR) 80 MG tablet     B Complex Vitamins (VITAMIN B-COMPLEX PO)     blood glucose (NO BRAND SPECIFIED) lancets standard     blood glucose monitoring (NO BRAND SPECIFIED) meter device kit     blood glucose monitoring (NO BRAND SPECIFIED) test strip     CALCIUM-MAGNESIUM-VITAMIN D PO     Cholecalciferol (VITAMIN D) 1000 UNIT capsule      DiphenhydrAMINE HCl (BENADRYL PO)     EPINEPHrine (EPIPEN/ADRENACLICK/OR ANY BX GENERIC EQUIV) 0.3 MG/0.3ML injection 2-pack     escitalopram (LEXAPRO) 20 MG tablet     estradiol (VAGIFEM) 10 MCG TABS vaginal tablet     fluticasone (FLONASE) 50 MCG/ACT spray     lisdexamfetamine (VYVANSE) 50 MG capsule     lisinopril (PRINIVIL/ZESTRIL) 5 MG tablet     LORazepam (ATIVAN) 1 MG tablet     Magnesium Hydroxide (MAGNESIA PO)     metFORMIN (GLUCOPHAGE-XR) 500 MG 24 hr tablet     Multiple Vitamins-Minerals (MULTIVITAMIN ADULT PO)     nitroFURantoin macrocrystal-monohydrate (MACROBID) 100 MG capsule     Probiotic Product (PRO-BIOTIC BLEND PO)     rivaroxaban ANTICOAGULANT (XARELTO) 20 MG TABS tablet     traZODone (DESYREL) 50 MG tablet     No current facility-administered medications for this visit.      ROS:  As above per HPI    Objective:   /88   Pulse 69   Temp 98.1  F (36.7  C) (Oral)   Wt 93 kg (205 lb)   LMP 01/01/2009   SpO2 99%   BMI 35.74 kg/m  , Body mass index is 35.74 kg/m .  Gen:  NAD, well-nourished, sitting in chair comfortably  HEENT: EOMI, sclera anicteric, Head normocephalic, ; nares patent; moist mucous membranes, on enlarged tonsils  Neck: trachea midline, no thyromegaly, no enlarged nodes of neck  CV:  Hemodynamically stable, RRR  Pulm:  no increased work of breathing , CTAB, no wheezes/rales/rhonchi   Extrem: no cyanosis, edema or clubbing   Skin: no obvious rashes or abnormalities  Psych: Euthymic, linear thoughts, normal rate of speech    Assessment & Plan:   Hafsa DESHPANDE Cj, 64 year old female who presents with:    Viral URI with cough  Unremarkable exam with stable vital signs. Suspicion for pneumonia/flu low at this time. reassurance provided, appears her symptoms of hand/foot aches and her fatigue are improving. Return as needed if symptoms worsen. Okay to continue tylenol. Continue fluticasone for sinus pressure.       Darwin Pizarro MD   [place] [facility]    The use of  [employer] dictation services may have been used to construct the content in this note; any grammatical or spelling errors are non-intentional. Please contact the author of this note directly if you are in need of any clarification.

## 2019-09-13 NOTE — PATIENT INSTRUCTIONS
Expect symptoms to improve over the next few days    Take tylenol every 4-6 hours for fever or sore muscles/joints    Continue the honey in warm water to help with the cough    Continue the fluticasone nasal spray once a day to help with the sinus pressure    If symptoms get worse please return to be seen or call the clinic to discuss with nurse adviser

## 2019-09-23 DIAGNOSIS — C56.9 MALIGNANT NEOPLASM OF OVARY, UNSPECIFIED LATERALITY (H): ICD-10-CM

## 2019-09-23 LAB — CANCER AG125 SERPL-ACNC: 12 U/ML (ref 0–30)

## 2019-09-23 PROCEDURE — 86304 IMMUNOASSAY TUMOR CA 125: CPT | Performed by: NURSE PRACTITIONER

## 2019-09-23 PROCEDURE — 25000128 H RX IP 250 OP 636: Performed by: NURSE PRACTITIONER

## 2019-09-23 RX ORDER — HEPARIN SODIUM (PORCINE) LOCK FLUSH IV SOLN 100 UNIT/ML 100 UNIT/ML
5 SOLUTION INTRAVENOUS EVERY 8 HOURS
Status: DISCONTINUED | OUTPATIENT
Start: 2019-09-23 | End: 2019-10-01 | Stop reason: HOSPADM

## 2019-09-23 RX ADMIN — HEPARIN 5 ML: 100 SYRINGE at 12:02

## 2019-09-26 NOTE — PROGRESS NOTES
Oncology Risk Management Consultation:  Date on this visit: 2019    Hafsa Corona  returns to the [facility]  today for a  follow up visit. She requires evaluation for her risk of cancer secondary to having a family history of breast cancer in her mother at age 63.      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    She is considered to at high risk for breast cancer and has a 30.3% lifetime risk for breast cancer by the SHERON model. She has a 10.8% 5 year risk by the SHERON model.     Primary Physician:  MARLON Reilly-NP    History Of Present Illness:  Ms. Corona is a very pleasant  64 year old female who presents with family history of breast cancer.     Genetic testin2017 - Negative for mutations in the AIP, ALK, APC, COSTA, BAP1, BARD1, BLM, BRCA1, BRCA2, BRIP1, BMPR1A, CDH1, CDK4, CDKN1B, CDKN2A, CHEK2, DICER1, EPCAM, FANCC, FH, FLCN, GALNT12, GREM1, HOXB13, MAX, MEN1, MET, MITF, MLH1, MRE11A, MSH2, MSH6, MUTYH, NBN, NF1, NF2, PALB2, PHOX2B, PMS2, POLD1, POLE, POT1, KUKJP9F, PTCH1, PTEN, RAD50, RAD51C, RAD51D, RB1, RET, SDHA, SDHAF2, SDHB, SDHC, SDHD, SMAD4, SMARCA4, SMARCB1, SMARCE1, STK11, SUFU, IHGM145, TP53, TSC1, TSC2, VHL, and XRCC2 genes using a Cancer-Next Expanded panel through [facility].     Pertinent history:  2007- R salpingo oophorectomy for abnormal imaging (enlarged ovary), biopsy showed endometriosis  2017 - Stage 1C2 Clear cell carcinoma of the L ovary, s/p HELENE/LSO and chemotherapy.  18: R nephrectomy with Dr. 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   Nulliparous.  Menarche at 10.  Menopause: age 54  Hx of OCPs x6 years.  No hx of HRT.  Density: heterogeneously fibroglandular tissue  No history of breast biopsy.  Hx of breast cyst in , aspirated   No history of hyperplasia, atypia, or malignancy    Pertinent screening history:  2011 - Screening mammogram, normal by  report   2/1/2014 - Screening mammogram, normal by report  9/15/2017 - Screening mammogram, BiRads1.    4/5/2018 - Breast MRI, BiRads2.  9/21/2018: Screening tomosynthesis mammogram, BI-RADS 1.  4/9/2019: Breast MRI, BI-RADS 1.    At this visit, she denies asymmetry, lumps, masses, thickening, pain, nipple discharge and skin changes.      Past Medical/Surgical History:  Past Medical History:   Diagnosis Date     Anxiety state, unspecified     [id number]     Arthritis 2014    vague, gradual, not treated really, knees feel it     At high risk for breast cancer 09/27/2019    30.3% lifetime risk by SHERON model     Attention deficit disorder without mention of hyperactivity      Cancer (H) 7/2017 and 1/2018    ovarian and kidney cancers, both treated well     Chronic rhinitis      Depressive disorder lifetime    plus Anxiety plus ADD. Escitalipram, therapy, ADD meds maybe     Depressive disorder, not elsewhere classified      Heart disease 1999    tachycardia and high cholesterol, managed     History of blood transfusion 7/2017    while in hospital for ovarian cancer     Hypertension 1999    tho BP was too LOW last week. past 12 years Generally OK     Panic disorder without agoraphobia      Pure hypercholesterolemia     Lipitor     Tachycardia      Tachycardia, unspecified     9/1999-2/2004     Type II or unspecified type diabetes mellitus without mention of complication, not stated as uncontrolled     diagnosed 2004     Past Surgical History:   Procedure Laterality Date     AS REMV KIDNEY,RADICAL Right      BIOPSY  7/2017 and 2/2018    ovarian and kidney cancer biopsies. also 1987 breast benign     BREAST SURGERY  1987    date unsure. benign breast cyst removed     CATARACT IOL, RT/LT Left 05/18/2018     CATARACT IOL, RT/LT Right 07/1318     COLONOSCOPY  2008 and 2013    polyps removed. Next due fall 2018     HYSTERECTOMY TOTAL ABDOMINAL, BILATERAL SALPINGO-OOPHORECTOMY, NODE DISSECTION, COMBINED Left 7/7/2017     Procedure: COMBINED HYSTERECTOMY TOTAL ABDOMINAL, SALPINGO-OOPHORECTOMY, NODE DISSECTION;  Exploratory Laparotomy, Left Salpingo-Oophorectomy, Cancer Staging, Total Hysterectomy, Omentectomy, Evacuation of abdominal fluid, Lymph Node Dissection  Anesthesia Block ;  Surgeon: Serena Cole MD;  Location: [facility] OR     HYSTERECTOMY, PAP NO LONGER INDICATED  07/07/2017    Laparotomy; for ovarian cancer staging     HYSTERECTOMY, PAP NO LONGER INDICATED       INSERT PORT VASCULAR ACCESS Right 8/21/2017    Procedure: INSERT PORT VASCULAR ACCESS;  Single Lumen Chest Power Port;  Surgeon: Stephen Mike PA-C;  Location: [facility] OR     LYMPHADENECTOMY RETROPERITONEAL Bilateral 07/07/2017    Laparotomy; pelvics & para-aortics; for ovarian cancer staging     OMENTECTOMY  07/07/2017    Laparotomy; for ovarian cancer staging     ORTHOPEDIC SURGERY  1/2002    broken left jaw due to fall, 4 fractures, titanium plates     PHACOEMULSIFICATION CLEAR CORNEA WITH STANDARD INTRAOCULAR LENS IMPLANT Right 7/13/2018    Procedure: PHACOEMULSIFICATION CLEAR CORNEA WITH STANDARD INTRAOCULAR LENS IMPLANT;  RIght Eye Phacoemulsification Clear Cornea with Standard Intraocular Lens Placement ;  Surgeon: Mary Jo Massey MD;  Location: [facility] OR     PHACOEMULSIFICATION CLEAR CORNEA WITH TORIC INTRAOCULAR LENS IMPLANT Left 5/18/2018    Procedure: PHACOEMULSIFICATION CLEAR CORNEA WITH TORIC INTRAOCULAR LENS IMPLANT;  Left Eye Phacoemulsification with Toric Lens;  Surgeon: Mary Jo Massey MD;  Location: [facility] OR     SALPINGO OOPHORECTOMY,R/L/KAYY Right 2008    Salpingo Oophorectomy, RT     SALPINGO OOPHORECTOMY,R/L/KAYY Left 07/07/2017    Laparotomy; for ovarian cancer staging     SURGICAL HISTORY OF -       facial surgery d/t fall in 1/2002     SURGICAL HISTORY OF -       1985 removal of breast cyst     SURGICAL HISTORY OF -   2008    endometrial ablation     YAG CAPSULOTOMY OD (RIGHT EYE)  10/22/2018       Allergies:  Allergies as of 09/27/2019 -  Reviewed 09/27/2019   Allergen Reaction Noted     Paclitaxel Anaphylaxis and Difficulty breathing 08/30/2017     Wasps [hornets] Anaphylaxis 09/03/2009     Yellow hornet venom [hornet venom] Anaphylaxis and Swelling 05/10/2018     Yellow jacket venom [venomil honey bee] Anaphylaxis and Swelling 05/10/2018     Sulfa drugs Hives and Rash 07/19/2005       Current Medications:  Current Outpatient Medications   Medication Sig Dispense Refill     Acetaminophen (TYLENOL PO) Take 500 mg by mouth 2 tabs prn pain (monthly)       atorvastatin (LIPITOR) 80 MG tablet TAKE 1 TABLET BY MOUTH EVERY DAY 90 tablet 0     B Complex Vitamins (VITAMIN B-COMPLEX PO) Take 50 mg by mouth        CALCIUM-MAGNESIUM-VITAMIN D PO Take 2 tablets by mouth daily       Cholecalciferol (VITAMIN D) 1000 UNIT capsule Take 2,000 Units by mouth daily        DiphenhydrAMINE HCl (BENADRYL PO) Take 50 mg by mouth daily as needed (monthly)       EPINEPHrine (EPIPEN/ADRENACLICK/OR ANY BX GENERIC EQUIV) 0.3 MG/0.3ML injection 2-pack Inject 0.3 mLs (0.3 mg) into the muscle once as needed for anaphylaxis 0.3 mL PRN     escitalopram (LEXAPRO) 20 MG tablet Take 1 tablet (20 mg) by mouth daily 90 tablet 3     fluticasone (FLONASE) 50 MCG/ACT spray SHAKE WELL AND USE 2 SPRAYS IN EACH NOSTRIL DAILY 48 g 3     lisinopril (PRINIVIL/ZESTRIL) 5 MG tablet Take 1 tablet (5 mg) by mouth daily 90 tablet 1     LORazepam (ATIVAN) 1 MG tablet Take 1 tablet (1 mg) by mouth every 6 hours as needed (nausea/vomiting, anxiety or sleep) 30 tablet 1     Magnesium Hydroxide (MAGNESIA PO) Take 500 mg by mouth       metFORMIN (GLUCOPHAGE-XR) 500 MG 24 hr tablet TAKE 2 TABLETS BY MOUTH EVERY DAY WITH THE EVENING MEAL 60 tablet 0     Multiple Vitamins-Minerals (MULTIVITAMIN ADULT PO) Take 1 tablet by mouth daily       nitroFURantoin macrocrystal-monohydrate (MACROBID) 100 MG capsule TK 1 C PO AFTER INTERCOUSE D PRN  0     Probiotic Product (PRO-BIOTIC BLEND PO) Take 1 capsule by mouth  daily Enzymatic Therapy-probiotic pearls       rivaroxaban ANTICOAGULANT (XARELTO) 20 MG TABS tablet Take 1 tablet (20 mg) by mouth daily (with dinner) 30 tablet 11     traZODone (DESYREL) 50 MG tablet TAKE 1 TO 2 TABLETS(50  MG) BY MOUTH EVERY NIGHT AS NEEDED FOR SLEEP 180 tablet 1     estradiol (VAGIFEM) 10 MCG TABS vaginal tablet Place 1 tablet (10 mcg) vaginally twice a week (Patient not taking: Reported on 2019) 15 tablet 1     lisdexamfetamine (VYVANSE) 50 MG capsule Take 50 mg by mouth          Family History:  Family History   Problem Relation Age of Onset     C.A.D. Father      Diabetes Father         Later in his life, in his 70s     Hypertension Father      Cerebrovascular Disease Father         1984 or      Psychotic Disorder Father      Pancreatic Cancer Father      Coronary Artery Disease Father         diagnosed in his late 50s (age)     Hyperlipidemia Father         treated w statins     Other Cancer Father         pancreatic, ,  of this     Depression Father      Mental Illness Father         likely PTSD and depression     Obesity Father      C.A.D. Mother      Hypertension Mother      Breast Cancer Mother         2 times,  and ?     Psychotic Disorder Mother      Colon Cancer Mother         ?     Cancer Mother         Bone cancer     Coronary Artery Disease Mother         diagnosed in her late 70s (age)     Hyperlipidemia Mother         treated w. statins     Other Cancer Mother         bone/marrow, ,  of this     Depression Mother      Anxiety Disorder Mother      Mental Illness Mother         various undiagnosed types, but THERE     Obesity Mother      Prostate Problems Brother         cleared      Hypertension Brother      Hyperlipidemia Brother         unsure if treated w statins     Depression Brother      Anxiety Disorder Brother      Mental Illness Brother         undiagnosed but THERE     Obesity Brother      Psychotic Disorder Sister          x2     Neurologic Disorder Sister      Hypertension Sister      Hyperlipidemia Sister         treated w statins     Depression Sister      Anxiety Disorder Sister      Obesity Sister      Psychotic Disorder Brother         x2     Depression Brother         major depressive disorder and OCD     Anxiety Disorder Brother      Mental Illness Brother         not sure of diagnoses, treated     Hypertension Brother      Hyperlipidemia Brother      Psychotic Disorder Maternal Grandmother         ?     Coronary Artery Disease Maternal Grandmother          of heart attack age 84?     Depression Maternal Grandmother      Psychotic Disorder Maternal Grandfather         ?     Psychotic Disorder Paternal Grandmother         Schizophernia     Coronary Artery Disease Paternal Grandmother          of heart attack, age 85?     Depression Paternal Grandmother         severe, but recovered     Mental Illness Paternal Grandmother         possible bipolar or other     Psychotic Disorder Paternal Grandfather         ?     Respiratory Paternal Grandfather          of emphysema and smoking     Psychotic Disorder Sister      Other - See Comments Sister         small kidney stone      Hypertension Sister      Hyperlipidemia Sister         treated w statins     Depression Sister      Anxiety Disorder Sister      Cancer - colorectal No family hx of      Glaucoma No family hx of      Macular Degeneration No family hx of        Social History:  Social History     Socioeconomic History     Marital status: Single     Spouse name: Not on file     Number of children: 0     Years of education: Not on file     Highest education level: Not on file   Occupational History     Comment: [employer]   Social Needs     Financial resource strain: Not on file     Food insecurity:     Worry: Not on file     Inability: Not on file     Transportation needs:     Medical: Not on file     Non-medical: Not on file   Tobacco Use     Smoking  status: Never Smoker     Smokeless tobacco: Never Used   Substance and Sexual Activity     Alcohol use: Yes     Comment: Very infrequent, a bit of wine or beer 2x per month maybe     Drug use: Yes     Types: Marijuana     Comment: infrequent, for celebrations only, maybe 8x per year     Sexual activity: Yes     Partners: Male     Birth control/protection: None     Comment: long-time    Lifestyle     Physical activity:     Days per week: Not on file     Minutes per session: Not on file     Stress: Not on file   Relationships     Social connections:     Talks on phone: Not on file     Gets together: Not on file     Attends Restorationist service: Not on file     Active member of club or organization: Not on file     Attends meetings of clubs or organizations: Not on file     Relationship status: Not on file     Intimate partner violence:     Fear of current or ex partner: Not on file     Emotionally abused: Not on file     Physically abused: Not on file     Forced sexual activity: Not on file   Other Topics Concern     Parent/sibling w/ CABG, MI or angioplasty before 65F 55M? No   Social History Narrative     Not on file       Physical Exam:  /73   Pulse 90   Temp 98.4  F (36.9  C) (Oral)   Resp 16   Wt 93.9 kg (207 lb)   LMP 01/01/2009   SpO2 96%   BMI 36.09 kg/m      GENERAL APPEARANCE: has a cold, wearing a mask to prevent cross infection.      NECK: no adenopathy, no asymmetry or masses     LYMPHATICS: Bilateral enlarged cervical lymph nodes, No supraclavicular or axillary lymphadenopathy     RESP: lungs clear to auscultation - no rales, rhonchi or wheezes     CARDIOVASCULAR: regular rate and rhythm, normal S1 S2, no S3 or S4 and no murmur.   BREAST: A multi positional, bilateral breast exam was performed.  Fairly symmetrical pendulous breasts, Ll>R. Right breast: no palpable dominant masses, no nipple discharge, no skin changes. Soft tissue with minor fibrocystic changes toward anterior level of  breast.  Yellowish bruise at 10 o'clock position. Right axilla: no palpable adenopathy. Left breast: no palpable dominant masses, no nipple discharge, no skin changes. Left axilla: no palpable adenopathy. Soft tissue with minor areas of density noted in anterior portion.  SKIN: no suspicious lesions or rashes on anterior or posterior torso, upper extremities and face.     Laboratory/Imaging Studies  Results for orders placed or performed in visit on 09/23/19      Result Value Ref Range     12 0 - 30 U/mL       ASSESSMENT  We discussed her last screening tests and reviewed her family history; there are no changes to report. We reviewed her level of risk and what might be recommended for prevention. We discussed the use of LOWER DOSE TAMOXIFEN for Prevention of breast cancer in women with DCIS, LCIS or atypia on biopsy: Treatment with a lower dose of tamoxifen (5 mg per day) has been shown to reduce the risk for disease recurrence and new disease for women who had been treated with lumpectomy  for ductal carcinoma in situ (DCIS), lobular carcinoma in situ( LCIS) and atypical ductal hyperplasia (ADH) in a randomized, phase III clinical trial (LOPEZ-01). (Zachary et al 2019). The trial involved 500 women over a period of 5 years. The data was shown to reduce the risk for recurrence or new disease by 52%. This data was presented at the 2018 [place] [facility] Symposium help December 4-8, 2018.  Notably, in this Phase III clinical trial, the data showed that the lower dose of tamoxifen did not cause significant adverse events (blood clots or uterine hyperplasia) or increase menopausal symptoms (hot flashes vaginal dryness, insomnia, loss of libido).     She is currently taking rivaroxaban 20 mg daily, per Dr. William iVllarreal, for a history of cancer-related DVTs. I will consult with him to discuss whether he is amenable to her taking low dose tamoxifen, as she is interested in this.    Today, her exam is  unremarkable and she will proceed with the following screening plan.     INDIVIDUALIZED CANCER RISK MANAGEMENT PLAN:  Individualized Surveillance Plan for women  With 20% or greater lifetime risk of breast cancer   Per NCCN Breast Cancer Screening and Diagnosis Guidelines Version 2.2018    Recommended screening Test or procedure Last done Next Scheduled    Clinical encounter Ongoing risk assessment, risk reduction counseling and clinical breast exam, every 6-12 months. Refer to genetic counseling if not already done.   9/26/2019 October 2020   However, some family histories with breast cancers at a very young age, may warrant screening starting earlier.    *May begin at age 40 if breast cancers in the family occur at later ages.    Annual mammogram beginning 10 years younger than the earliest breast cancer in the family but not prior to age 30.    Recommend annual breast MRI to begin 10 years younger than the earliest breast cancer in the family but not prior to age 25.    Breast MRIs are preferably done on day 7-15 of the menstrual cycle in premenopausal women. 9/21/2018: Screening tomosynthesis mammogram, BI-RADS 1.    4/9/2019: Breast MRI, BI-RADS 1. Mammogram today after appt.    Next breast MRI: April 2020    Next exam: October 2020 followed by tomosynthesis mammogram   Breast screening for patients at high risk due to thoracic radiation before 30 years old    Begin 10 years post radiation treatment or age 25.   Annual mammogram beginning 10 years after radiation but not prior to age 30    Annual breast MRI, preferably on day 7-15 of menstrual cycle for premenopausal women.       NA     NA   Women who have a lifetime risk of >20% based on history of LCIS or ADH/ALH Annual screening mammogram beginning at age of LCIS or ADH/ALH but not prior to age 30.    Consider annual MRI to begin at age of diagnosis of LCIS or ADH/ALH but not prior to age 25.    Consider risk reducing strategies.     NA     NA    Recommend  risk reducing strategies for women with 1.7% 5 year risk of breast cancer. 10.4% 5 year risk by SHERON model. Discussed low dose tamoxifen.      I spent 30 minutes with the patient with greater that 50% of it in counseling and coordinating care as documented above.    Stephie Karimi, MARLON-CNS, OCN, AGN-BC  Clinical Nurse Specialist  Cancer Risk Management Program  [address]  [place] [address]    phone:  [phone]  Pager: [phone]  fax: [phone]    CC: MD Morelia De La Garza, APRN-NP   Jarod Shaffer MD

## 2019-09-27 ENCOUNTER — ANCILLARY PROCEDURE (OUTPATIENT)
Dept: MAMMOGRAPHY | Facility: CLINIC | Age: 64
End: 2019-09-27
Payer: COMMERCIAL

## 2019-09-27 ENCOUNTER — ONCOLOGY VISIT (OUTPATIENT)
Dept: ONCOLOGY | Facility: CLINIC | Age: 64
End: 2019-09-27
Attending: CLINICAL NURSE SPECIALIST
Payer: COMMERCIAL

## 2019-09-27 VITALS
TEMPERATURE: 98.4 F | HEART RATE: 90 BPM | WEIGHT: 207 LBS | RESPIRATION RATE: 16 BRPM | BODY MASS INDEX: 36.09 KG/M2 | SYSTOLIC BLOOD PRESSURE: 126 MMHG | OXYGEN SATURATION: 96 % | DIASTOLIC BLOOD PRESSURE: 73 MMHG

## 2019-09-27 VITALS
RESPIRATION RATE: 16 BRPM | SYSTOLIC BLOOD PRESSURE: 126 MMHG | OXYGEN SATURATION: 96 % | HEART RATE: 90 BPM | TEMPERATURE: 98.4 F | DIASTOLIC BLOOD PRESSURE: 73 MMHG | WEIGHT: 207 LBS | BODY MASS INDEX: 36.09 KG/M2

## 2019-09-27 DIAGNOSIS — Z90.5 HISTORY OF NEPHRECTOMY: ICD-10-CM

## 2019-09-27 DIAGNOSIS — J06.9 UPPER RESPIRATORY TRACT INFECTION, UNSPECIFIED TYPE: ICD-10-CM

## 2019-09-27 DIAGNOSIS — Z12.39 BREAST CANCER SCREENING, HIGH RISK PATIENT: Primary | ICD-10-CM

## 2019-09-27 DIAGNOSIS — Z12.39 BREAST CANCER SCREENING, HIGH RISK PATIENT: ICD-10-CM

## 2019-09-27 DIAGNOSIS — Z08 ENCOUNTER FOR FOLLOW-UP SURVEILLANCE OF OVARIAN CANCER: Primary | ICD-10-CM

## 2019-09-27 DIAGNOSIS — Z91.89 AT HIGH RISK FOR BREAST CANCER: ICD-10-CM

## 2019-09-27 DIAGNOSIS — R91.8 PULMONARY NODULES: ICD-10-CM

## 2019-09-27 DIAGNOSIS — Z80.3 FAMILY HISTORY OF MALIGNANT NEOPLASM OF BREAST: ICD-10-CM

## 2019-09-27 DIAGNOSIS — Z85.43 ENCOUNTER FOR FOLLOW-UP SURVEILLANCE OF OVARIAN CANCER: Primary | ICD-10-CM

## 2019-09-27 LAB
ANION GAP SERPL CALCULATED.3IONS-SCNC: 8 MMOL/L (ref 3–14)
BASOPHILS # BLD AUTO: 0 10E9/L (ref 0–0.2)
BASOPHILS NFR BLD AUTO: 0.7 %
BUN SERPL-MCNC: 23 MG/DL (ref 7–30)
CALCIUM SERPL-MCNC: 9.4 MG/DL (ref 8.5–10.1)
CHLORIDE SERPL-SCNC: 103 MMOL/L (ref 94–109)
CO2 SERPL-SCNC: 25 MMOL/L (ref 20–32)
CREAT SERPL-MCNC: 1.15 MG/DL (ref 0.52–1.04)
DIFFERENTIAL METHOD BLD: ABNORMAL
EOSINOPHIL # BLD AUTO: 0.2 10E9/L (ref 0–0.7)
EOSINOPHIL NFR BLD AUTO: 2.6 %
ERYTHROCYTE [DISTWIDTH] IN BLOOD BY AUTOMATED COUNT: 13.5 % (ref 10–15)
GFR SERPL CREATININE-BSD FRML MDRD: 50 ML/MIN/{1.73_M2}
GLUCOSE SERPL-MCNC: 98 MG/DL (ref 70–99)
HCT VFR BLD AUTO: 36.3 % (ref 35–47)
HGB BLD-MCNC: 11.5 G/DL (ref 11.7–15.7)
IMM GRANULOCYTES # BLD: 0 10E9/L (ref 0–0.4)
IMM GRANULOCYTES NFR BLD: 0.2 %
LYMPHOCYTES # BLD AUTO: 1.8 10E9/L (ref 0.8–5.3)
LYMPHOCYTES NFR BLD AUTO: 30.6 %
MCH RBC QN AUTO: 28.6 PG (ref 26.5–33)
MCHC RBC AUTO-ENTMCNC: 31.7 G/DL (ref 31.5–36.5)
MCV RBC AUTO: 90 FL (ref 78–100)
MONOCYTES # BLD AUTO: 0.5 10E9/L (ref 0–1.3)
MONOCYTES NFR BLD AUTO: 9.4 %
NEUTROPHILS # BLD AUTO: 3.3 10E9/L (ref 1.6–8.3)
NEUTROPHILS NFR BLD AUTO: 56.5 %
NRBC # BLD AUTO: 0 10*3/UL
NRBC BLD AUTO-RTO: 0 /100
PLATELET # BLD AUTO: 262 10E9/L (ref 150–450)
POTASSIUM SERPL-SCNC: 3.9 MMOL/L (ref 3.4–5.3)
RBC # BLD AUTO: 4.02 10E12/L (ref 3.8–5.2)
SODIUM SERPL-SCNC: 136 MMOL/L (ref 133–144)
WBC # BLD AUTO: 5.8 10E9/L (ref 4–11)

## 2019-09-27 PROCEDURE — 80048 BASIC METABOLIC PNL TOTAL CA: CPT | Performed by: NURSE PRACTITIONER

## 2019-09-27 PROCEDURE — G0463 HOSPITAL OUTPT CLINIC VISIT: HCPCS | Mod: ZF

## 2019-09-27 PROCEDURE — 99214 OFFICE O/P EST MOD 30 MIN: CPT | Mod: ZP | Performed by: NURSE PRACTITIONER

## 2019-09-27 PROCEDURE — 36415 COLL VENOUS BLD VENIPUNCTURE: CPT

## 2019-09-27 PROCEDURE — 99214 OFFICE O/P EST MOD 30 MIN: CPT | Mod: ZP | Performed by: CLINICAL NURSE SPECIALIST

## 2019-09-27 PROCEDURE — 85025 COMPLETE CBC W/AUTO DIFF WBC: CPT | Performed by: NURSE PRACTITIONER

## 2019-09-27 RX ORDER — TAMOXIFEN CITRATE 10 MG/1
5 TABLET ORAL DAILY
Qty: 45 TABLET | Refills: 1 | Status: CANCELLED | OUTPATIENT
Start: 2019-09-27 | End: 2020-01-25

## 2019-09-27 ASSESSMENT — PAIN SCALES - GENERAL
PAINLEVEL: MILD PAIN (2)
PAINLEVEL: MILD PAIN (2)

## 2019-09-27 NOTE — NURSING NOTE
"Oncology Rooming Note    September 27, 2019 9:39 AM   Hafsa Corona is a 64 year old female who presents for:    Chief Complaint   Patient presents with     Oncology Clinic Visit     Return Breast Ca     Initial Vitals: /73   Pulse 90   Temp 98.4  F (36.9  C) (Oral)   Resp 16   Wt 93.9 kg (207 lb)   LMP 01/01/2009   SpO2 96%   BMI 36.09 kg/m   Estimated body mass index is 36.09 kg/m  as calculated from the following:    Height as of 9/12/19: 1.613 m (5' 3.5\").    Weight as of this encounter: 93.9 kg (207 lb). Body surface area is 2.05 meters squared.  Mild Pain (2) Comment: Cold   Patient's last menstrual period was 01/01/2009.  Allergies reviewed: Yes  Medications reviewed: Yes    Medications: Medication refills not needed today.  Pharmacy name entered into [facility]: [id number] - [place], [place] - [address] AT [facility] [address]    Clinical concerns: patient has had many viruses lately       Nevaeh Sarabia CMA              "

## 2019-09-27 NOTE — PROGRESS NOTES
"Gynecologic Oncology Follow-Up Visit    RE: Hafsa Corona  MRN: [id number]  : 1955  Date of Visit: 2019    CC: Hafsa Corona  is a 64 year old female with a history of stage IC2 clear cell ovarian carcinoma. She completed treatment with surgery and three cycles of chemotherapy on 10/17/17. She presents today for a three month surveillance visit.    HPI: Hafsa comes to the clinic feeling well from a gynecologic perspective. No vaginal bleeding or pelvic pain. Sexually active without concern, takes nitrofurantoin as post-coital UTI prophylaxis. Denies bloating, early satiety, nausea/vomiting, changes in bowels/bladder patterns. She continues to have frequent upper respiratory infections- recently was seen in urgent care and diagnosed with viral URI. She feels this resolved but then feels she developed a new virus. Not having significant arthralgias/myalgias or fevers but feels like she has swollen glands, is fatigued, and has a cough. Seeing her PCP next week. Up to date on breast screening (high risk screening through [facility]). Due for colonoscopy but states she will call to schedule this as she wants it done with [facility] in [place]. Due for repeat chest CT to eval for stability of pulmonary nodules. States she is supposed to have monitoring of her renal function every three months given her history of nephrectomy but has not had this done for awhile. Still has her port in place, having this flushed q6wks- does not want it out yet, reports feeling   \"superstitious\" about port removal and that her cancer will recur if she has this removed. No other concerns today.      Oncology History:  The patient has had a recent episode of lower abdominal pain in early July.  She was subsequently sent to the emergency room and was found to have a large 9 cm complex ovarian mass. She was admitted to the hospital for pain control.  7/3/17:  1187  17: Exploratory laparotomy, total abdominal " hysterectomy, left salpingo-oophorectomy, cancer staging including infracolic omentectomy, bilateral pelvic & para-aortic lymphadenectomy, peritoneal biopsies, evacuation of pelvic fluid by Dr. Cole.    FINAL DIAGNOSIS:   A. LEFT OVARY AND FALLOPIAN TUBE, LEFT SALPINGO-OOPHORECTOMY:   - Ovarian clear cell carcinoma   - Background of endometriosis   - Fallopian tube with no significant histologic abnormality.   B. UTERUS, HYSTERECTOMY:   -  Inactive endometrium   -  Myometrium with adenomyosis and leiomyoma.   -  Cervix with squamous metaplasia.   C. PERITONEUM, RIGHT PELVIS, BIOPSY:   - Fibroadipose tissue, negative for malignancy.   D. LYMPH NODES, RIGHT PELVIC, DISSECTION:   - Thirteen benign lymph nodes (0/13).   E. LYMPH NODES, LEFT PELVIC, DISSECTION:   - Seven benign lymph nodes (0/7).   F. PERITONEUM, LEFT PELVIS, BIOPSY:   - Fibroadipose tissue, negative for malignancy.   G. PERITONEUM, BLADDER, BIOPSY:   - Fibroadipose tissue  with acute and chronic inflammation, negative for malignancy.   H. PERITONEUM, POSTERIOR CUL-DE-SAC, BIOPSY:   - Fibroadipose tissue, negative for malignancy.   I. PERITONEUM, LEFT PARACOLIC GUTTER, BIOPSY:   - Fibroadipose tissue, negative for malignancy.   J. LYMPH NODES, LEFT PARA-AORTIC, DISSECTION:   - Five benign lymph nodes (0/5).   K. LYMPH NODES, RIGHT PARA-AORTIC, DISSECTION:   - Two benign lymph nodes (0/2).   L. PERITONEUM, RIGHT PARACOLIC GUTTER, BIOPSY:   - Fibroadipose tissue, negative for malignancy.   M. OMENTUM, OMENTECTOMY:   - Adipose tissue with reactive changes, negative for malignancy.   COMMENT:   The final diagnosis confirms the interpretation provided intraoperatively.   Report Name: Ovary or Fallopian Tube   Status: Submitted   Part(s) Involved:   A: Ovary and fallopian tube, left   Synoptic Report:   CLINICAL   Clinical History:   - Pelvic mass   SPECIMEN   Procedure:   - Left salpingo-oophorectomy   - Hysterectomy   - Omentectomy   - Peritoneal  biopsies    Lymph Node Sampling:   - Performed   Location:   - Pelvic lymph nodes   - Para-aortic lymph nodes   Specimen Integrity:   - Left ovary   Specimen Integrity of Left Ovary:   - Capsule intact   TUMOR   Primary Tumor Site(s):   - Left ovary   Left Ovary   Tumor Size of Left Ovary: 19 cm   Histologic Type:   - Clear cell carcinoma   Tumor Extent   Ovarian Surface Involvement:   - Absent   Specimen(s)   Extent of Left Ovary:   - Involved   Extent of Left Fallopian Tube:   - Not involved   Extent of Omentum:   - Not involved   Extent of Uterus:   - Not involved   Extent of Peritoneum:   - Not involved   Peritoneal Ascitic Fluid:   - Not performed / unknown   Pleural Fluid:   - Not performed / unknown   LYMPH NODES     Number of Pelvic Lymph Nodes Examined: 20     Number of Pelvic Lymph Nodes Involved: None identified     Number of Para-aortic Lymph Nodes Examined: 7     Number of Para-Aortic Lymph Nodes Involved: None identified   STAGE (PTNM, AJCC 7TH ED.)   Primary Tumor (pT):   - Ovary   Ovarian Primary Tumor (pT):   - pT1a: Tumor limited to 1 ovary; capsule intact, no tumor on ovarian surface. No malignant cells in ascites or peritoneal washings#   Regional Lymph Nodes (pN):   - pN0: No regional lymph node metastasis       07/31/17: Dr Shaffer follow up: Discussed with the patient that given the high risk histology, as well as ruptured mass before surgery, she would be qualified at higher risk of recurrence despite early stage ovarian cancer.  Recommended adjuvant chemotherapy. Plan for carboplatin of AUC of 6 and paclitaxel of 175, m2 for 3 cycles. Referral for genetic counselor and will see her back after those 3 cycles  08/03/17: Call from patient stating she is going for a second opinion and would like chemotherapy rescheduled to week of 8/14/17.      08/16/17: Cycle #1 Carbo/Taxol.  73. Significant paclitaxel reaction.  08/30/17: C1 carboplatin/inpatient paclitaxel desensitization.   09/25/17: C2  carboplatin/paclitaxel- inpatient paclitaxel desensitization.  15.  10/16/17: C3 carboplatin/paclitaxel- switched to docetaxel given her neuropathy.  10.  11/14/17:  8. CT CAP:  IMPRESSION:   1. Post surgical changes of hysterectomy and bilateral  salpingo-oophorectomy. Nodular soft tissue density in the posterior  cul-de-sac along with ill-defined nodular thickening in the left  pelvis, suspicious for residual disease or metastatic deposits.   2. There is a 3 cm mass in the superior pole of the right kidney,  possibly extending into the renal hilum. Represents RCC until proven  otherwise. Consider renal mass protocol CT to assess renal vein  involvement.  3. Stable sub-4 mm pulmonary nodules, continued attention on  follow-up.    11/16/17: CT renal mass protocol  IMPRESSION:  1. Arterially enhancing mass measuring 3.6 x 2.1 x 2.2 cm mass arising  from superior posterior of the right kidney, this is renal cell  carcinoma until proven otherwise.  2. Stable fat-containing umbilical hernia.    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 Margins negative. Three month surveillance plan with [facility].     2/26/18:  14    5/22/18:  11     8/23/18:  11    12/12/18:  10    3/8/19:  12    6/19/19:  13    9/23/19:  12    Past Medical History:   Diagnosis Date     Anxiety state, unspecified     [id number]     Arthritis 2014    vague, gradual, not treated really, knees feel it     Attention deficit disorder without mention of hyperactivity      Cancer (H) 7/2017 and 1/2018    ovarian and kidney cancers, both treated well     Chronic rhinitis      Depressive disorder lifetime    plus Anxiety plus ADD. Escitalipram, therapy, ADD meds maybe     Depressive disorder, not elsewhere classified      Heart disease 1999    tachycardia and high cholesterol, managed     History of blood transfusion 7/2017    while in hospital for ovarian cancer      Hypertension 1999 tho BP was too LOW last week. past 12 years Generally OK     Panic disorder without agoraphobia      Pure hypercholesterolemia     Lipitor     Tachycardia      Tachycardia, unspecified     9/1999-2/2004     Type II or unspecified type diabetes mellitus without mention of complication, not stated as uncontrolled     diagnosed 2004       Past Surgical History:   Procedure Laterality Date     AS REMV KIDNEY,RADICAL Right      BIOPSY  7/2017 and 2/2018    ovarian and kidney cancer biopsies. also 1987 breast benign     BREAST SURGERY  1987    date unsure. benign breast cyst removed     CATARACT IOL, RT/LT Left 05/18/2018     CATARACT IOL, RT/LT Right 07/1318     COLONOSCOPY  2008 and 2013    polyps removed. Next due fall 2018     HYSTERECTOMY TOTAL ABDOMINAL, BILATERAL SALPINGO-OOPHORECTOMY, NODE DISSECTION, COMBINED Left 7/7/2017    Procedure: COMBINED HYSTERECTOMY TOTAL ABDOMINAL, SALPINGO-OOPHORECTOMY, NODE DISSECTION;  Exploratory Laparotomy, Left Salpingo-Oophorectomy, Cancer Staging, Total Hysterectomy, Omentectomy, Evacuation of abdominal fluid, Lymph Node Dissection  Anesthesia Block ;  Surgeon: Serena Cole MD;  Location: [facility] OR     HYSTERECTOMY, PAP NO LONGER INDICATED  07/07/2017    Laparotomy; for ovarian cancer staging     HYSTERECTOMY, PAP NO LONGER INDICATED       INSERT PORT VASCULAR ACCESS Right 8/21/2017    Procedure: INSERT PORT VASCULAR ACCESS;  Single Lumen Chest Power Port;  Surgeon: Stephen Mike PA-C;  Location: [facility] OR     LYMPHADENECTOMY RETROPERITONEAL Bilateral 07/07/2017    Laparotomy; pelvics & para-aortics; for ovarian cancer staging     OMENTECTOMY  07/07/2017    Laparotomy; for ovarian cancer staging     ORTHOPEDIC SURGERY  1/2002    broken left jaw due to fall, 4 fractures, titanium plates     PHACOEMULSIFICATION CLEAR CORNEA WITH STANDARD INTRAOCULAR LENS IMPLANT Right 7/13/2018    Procedure: PHACOEMULSIFICATION CLEAR CORNEA WITH STANDARD INTRAOCULAR  LENS IMPLANT;  RIght Eye Phacoemulsification Clear Cornea with Standard Intraocular Lens Placement ;  Surgeon: Mary Jo Massey MD;  Location: [facility] OR     PHACOEMULSIFICATION CLEAR CORNEA WITH TORIC INTRAOCULAR LENS IMPLANT Left 5/18/2018    Procedure: PHACOEMULSIFICATION CLEAR CORNEA WITH TORIC INTRAOCULAR LENS IMPLANT;  Left Eye Phacoemulsification with Toric Lens;  Surgeon: Mary Jo Massey MD;  Location: [facility] OR     SALPINGO OOPHORECTOMY,R/L/KAYY Right 2008    Salpingo Oophorectomy, RT     SALPINGO OOPHORECTOMY,R/L/KAYY Left 07/07/2017    Laparotomy; for ovarian cancer staging     SURGICAL HISTORY OF -       facial surgery d/t fall in 1/2002     SURGICAL HISTORY OF -       1985 removal of breast cyst     SURGICAL HISTORY OF -   2008    endometrial ablation     YAG CAPSULOTOMY OD (RIGHT EYE)  10/22/2018       Social History     Socioeconomic History     Marital status: Single     Spouse name: Not on file     Number of children: 0     Years of education: Not on file     Highest education level: Not on file   Occupational History     Employer: [employer]   Social Needs     Financial resource strain: Not on file     Food insecurity:     Worry: Not on file     Inability: Not on file     Transportation needs:     Medical: Not on file     Non-medical: Not on file   Tobacco Use     Smoking status: Never Smoker     Smokeless tobacco: Never Used   Substance and Sexual Activity     Alcohol use: Yes     Comment: Very infrequent, a bit of wine or beer 2x per month maybe     Drug use: Yes     Types: Marijuana     Comment: infrequent, for celebrations only, maybe 8x per year     Sexual activity: Yes     Partners: Male     Birth control/protection: None     Comment: long-time    Lifestyle     Physical activity:     Days per week: Not on file     Minutes per session: Not on file     Stress: Not on file   Relationships     Social connections:     Talks on phone: Not on file     Gets together: Not on file      Attends Religion service: Not on file     Active member of club or organization: Not on file     Attends meetings of clubs or organizations: Not on file     Relationship status: Not on file     Intimate partner violence:     Fear of current or ex partner: Not on file     Emotionally abused: Not on file     Physically abused: Not on file     Forced sexual activity: Not on file   Other Topics Concern     Parent/sibling w/ CABG, MI or angioplasty before 65F 55M? No   Social History Narrative     Not on file       Family History   Problem Relation Age of Onset     C.A.D. Father      Diabetes Father         Later in his life, in his 70s     Hypertension Father      Cerebrovascular Disease Father         1984 or      Psychotic Disorder Father      Pancreatic Cancer Father      Coronary Artery Disease Father         diagnosed in his late 50s (age)     Hyperlipidemia Father         treated w statins     Other Cancer Father         pancreatic, ,  of this     Depression Father      Mental Illness Father         likely PTSD and depression     Obesity Father      C.A.D. Mother      Hypertension Mother      Breast Cancer Mother         2 times,  and ?     Psychotic Disorder Mother      Colon Cancer Mother         ?     Cancer Mother         Bone cancer     Coronary Artery Disease Mother         diagnosed in her late 70s (age)     Hyperlipidemia Mother         treated w. statins     Other Cancer Mother         bone/marrow, ,  of this     Depression Mother      Anxiety Disorder Mother      Mental Illness Mother         various undiagnosed types, but THERE     Obesity Mother      Prostate Problems Brother         cleared      Hypertension Brother      Hyperlipidemia Brother         unsure if treated w statins     Depression Brother      Anxiety Disorder Brother      Mental Illness Brother         undiagnosed but THERE     Obesity Brother      Psychotic Disorder Sister         x2     Neurologic  Disorder Sister      Hypertension Sister      Hyperlipidemia Sister         treated w statins     Depression Sister      Anxiety Disorder Sister      Obesity Sister      Psychotic Disorder Brother         x2     Depression Brother         major depressive disorder and OCD     Anxiety Disorder Brother      Mental Illness Brother         not sure of diagnoses, treated     Hypertension Brother      Hyperlipidemia Brother      Psychotic Disorder Maternal Grandmother         ?     Coronary Artery Disease Maternal Grandmother          of heart attack age 84?     Depression Maternal Grandmother      Psychotic Disorder Maternal Grandfather         ?     Psychotic Disorder Paternal Grandmother         Schizophernia     Coronary Artery Disease Paternal Grandmother          of heart attack, age 85?     Depression Paternal Grandmother         severe, but recovered     Mental Illness Paternal Grandmother         possible bipolar or other     Psychotic Disorder Paternal Grandfather         ?     Respiratory Paternal Grandfather          of emphysema and smoking     Psychotic Disorder Sister      Other - See Comments Sister         small kidney stone      Hypertension Sister      Hyperlipidemia Sister         treated w statins     Depression Sister      Anxiety Disorder Sister      Cancer - colorectal No family hx of      Glaucoma No family hx of      Macular Degeneration No family hx of        Current Outpatient Medications   Medication     Acetaminophen (TYLENOL PO)     atorvastatin (LIPITOR) 80 MG tablet     B Complex Vitamins (VITAMIN B-COMPLEX PO)     CALCIUM-MAGNESIUM-VITAMIN D PO     Cholecalciferol (VITAMIN D) 1000 UNIT capsule     DiphenhydrAMINE HCl (BENADRYL PO)     EPINEPHrine (EPIPEN/ADRENACLICK/OR ANY BX GENERIC EQUIV) 0.3 MG/0.3ML injection 2-pack     escitalopram (LEXAPRO) 20 MG tablet     estradiol (VAGIFEM) 10 MCG TABS vaginal tablet     fluticasone (FLONASE) 50 MCG/ACT spray     lisdexamfetamine  (VYVANSE) 50 MG capsule     lisinopril (PRINIVIL/ZESTRIL) 5 MG tablet     LORazepam (ATIVAN) 1 MG tablet     Magnesium Hydroxide (MAGNESIA PO)     metFORMIN (GLUCOPHAGE-XR) 500 MG 24 hr tablet     Multiple Vitamins-Minerals (MULTIVITAMIN ADULT PO)     nitroFURantoin macrocrystal-monohydrate (MACROBID) 100 MG capsule     Probiotic Product (PRO-BIOTIC BLEND PO)     rivaroxaban ANTICOAGULANT (XARELTO) 20 MG TABS tablet     traZODone (DESYREL) 50 MG tablet     No current facility-administered medications for this visit.      Facility-Administered Medications Ordered in Other Visits   Medication     heparin 100 UNIT/ML injection 5 mL          Allergies   Allergen Reactions     Paclitaxel Anaphylaxis and Difficulty breathing     Wasps [Hornets] Anaphylaxis     Yellow Hornet Venom [Hornet Venom] Anaphylaxis and Swelling     Yellow Jacket Venom [Venomil Honey Bee] Anaphylaxis and Swelling     Sulfa Drugs Hives and Rash       Review of Systems  10 point ROS negative other than the symptoms noted above in the HPI.    OBJECTIVE:    Physical Exam:    /73   Pulse 90   Temp 98.4  F (36.9  C) (Oral)   Resp 16   Wt 93.9 kg (207 lb)   LMP 01/01/2009   SpO2 96%   BMI 36.09 kg/m      CONSTITUTIONAL: Alert non-toxic appearing female in no acute distress  HEAD: Normocephalic, atraumatic  NECK: Neck supple without palpable lymphadenopathy  RESPIRATORY: Lungs clear to auscultation, respirations unlabored, occasional dry cough noted  CV: Regular rate and rhythm, S1S2, no clicks, murmurs, rubs, or gallops; bilateral lower extremities without edema, dorsalis pedis pulses 2+ bilaterally  GASTROINTESTINAL: Normoactive bowel sounds x4 quadrants, abdomen soft, non-distended, and non-tender to palpation without masses or organomegaly  GENITOURINARY: External genitalia and urethral meatus pink without lesions, masses, or excoriation. Vagina pink and smooth without masses or lesions. Vaginal cuff without nodularity, masses, or lesions.  Cervix surgically absent. Bimanual exam reveals no masses or fullness. Rectovaginal exam confirms these findings.  LYMPHATIC: Cervical, supraclavicular, and inguinal nodes without lymphadenopathy  MUSCULOSKELETAL: Moves all extremities, no obvious muscle wasting  NEUROLOGIC: No gross deficits, normal gait  SKIN: Appropriate color for race, warm and dry, no rashes or lesions to unclothed skin  PSYCHIATRIC: Pleasant and interactive, affect bright, makes appropriate eye contact, thought process linear      Data:   12      9/27/2019   Hemoglobin 11.7 - 15.7 g/dL 11.5 (A)   Hematocrit 35.0 - 47.0 % 36.3   Platelet Count 150 - 450 10e9/L 262   Absolute Neutrophil 1.6 - 8.3 10e9/L 3.3   Sodium 133 - 144 mmol/L 136   Potassium 3.4 - 5.3 mmol/L 3.9   Chloride 94 - 109 mmol/L 103   Carbon Dioxide 20 - 32 mmol/L 25   Urea Nitrogen 7 - 30 mg/dL 23   Creatinine 0.52 - 1.04 mg/dL 1.15 (A)   Calcium 8.5 - 10.1 mg/dL 9.4   WBC 4.0 - 11.0 10e9/L 5.8     Assessment/Plan:  1) Stage IC2 clear cell ovarian carcinoma: No evidence of recurrent disease. Two years out from treatment, may continue surveillance every six months x3 years (through 10/2022) then annually thereafter with  and pelvic/rectal exam. Continue q6wk port flushes, may consider removal when she would like- discussed her concerns about recurring if she has this removed. Reviewed signs and symptoms  of recurrence including but not limited to bleeding from vagina, bladder, or rectum, bloating, early satiety, swelling in the lower extremities, abdominal or lower back pain, changes in bowel or bladder patterns, shortness of breath, increased fatigue, unexplained weight loss, and night sweats. Reviewed signs and symptoms for when she should contact the clinic or seek additional care. Patient to contact the clinic with any questions or concerns in the interim.   2) Pulmonary nodules: Stable on imaging 5/2018. Repeat chest CT fall of 2019 for two year stability. Order  placed.  3) Recurrent URIs: No fevers/myalgias/arthralgias to suggest influenza.  CBC with diff drawn given her history of carboplatin with recurrent infections- WBC/ANC WNL. Hemoglobin slightly low which may be impacting her fatigue. To follow with PCP for URIs, encouraged supportive cares.  4) Hx of nephrectomy: BMP ordered- creatinine slightly worsened, to follow with her nephrology team  5) Genetic testing: Hafsa is negative for mutations in the AIP, ALK, APC, COSTA, BAP1, BARD1, BLM, BRCA1, BRCA2, BRIP1, BMPR1A, CDH1, CDK4, CDKN1B, CDKN2A, CHEK2, DICER1, EPCAM, FANCC, FH, FLCN, GALNT12, GREM1, HOXB13, MAX, MEN1, MET, MITF, MLH1, MRE11A, MSH2, MSH6, MUTYH, NBN, NF1, NF2, PALB2, PHOX2B, PMS2, POLD1, POLE, POT1, MUQWE4V, PTCH1, PTEN, RAD50, RAD51C, RAD51D, RB1, RET, SDHA, SDHAF2, SDHB, SDHC, SDHD, SMAD4, SMARCA4, SMARCB1, SMARCE1, STK11, SUFU, TUCX097, TP53, TSC1, TSC2, VHL, and XRCC2 genes. No mutations were found in any of the 67 genes analyzed. High risk breast screening.  6) Labs:  12, BMP, CBC pending  7) Health maintenance issues discussed today include to follow up with PCP for co-morbid conditions and non-gynecologic issues. Continue follow up with nephrology. Due for colonoscopy.  8) Patient verbalized understanding of and agreement with plan.    25 minutes face to face time spent with patient, over 50% of which was spent in counseling and coordination of care.    MARLON Ogden, FNP-C  Division of Gynecologic Oncology  [facility]  Pager: [phone]

## 2019-09-27 NOTE — LETTER
"2019       RE: Hafsa Corona  [address]  [place] [address]     Dear Colleague,    Thank you for referring your patient, Hafsa Corona, to the [facility]. Please see a copy of my visit note below.    Gynecologic Oncology Follow-Up Visit    RE: Hafsa Corona  MRN: [id number]  : 1955  Date of Visit: 2019    CC: Hafsa Corona  is a 64 year old female with a history of stage IC2 clear cell ovarian carcinoma. She completed treatment with surgery and three cycles of chemotherapy on 10/17/17. She presents today for a three month surveillance visit.    HPI: Hafsa comes to the clinic feeling well from a gynecologic perspective. No vaginal bleeding or pelvic pain. Sexually active without concern, takes nitrofurantoin as post-coital UTI prophylaxis. Denies bloating, early satiety, nausea/vomiting, changes in bowels/bladder patterns. She continues to have frequent upper respiratory infections- recently was seen in urgent care and diagnosed with viral URI. She feels this resolved but then feels she developed a new virus. Not having significant arthralgias/myalgias or fevers but feels like she has swollen glands, is fatigued, and has a cough. Seeing her PCP next week. Up to date on breast screening (high risk screening through [facility]). Due for colonoscopy but states she will call to schedule this as she wants it done with [facility] in [place]. Due for repeat chest CT to eval for stability of pulmonary nodules. States she is supposed to have monitoring of her renal function every three months given her history of nephrectomy but has not had this done for awhile. Still has her port in place, having this flushed q6wks- does not want it out yet, reports feeling   \"superstitious\" about port removal and that her cancer will recur if she has this removed. No other concerns today.      Oncology History:  The patient has had a recent episode of lower abdominal pain in early " July.  She was subsequently sent to the emergency room and was found to have a large 9 cm complex ovarian mass. She was admitted to the hospital for pain control.  7/3/17:  1187  07/07/17: Exploratory laparotomy, total abdominal hysterectomy, left salpingo-oophorectomy, cancer staging including infracolic omentectomy, bilateral pelvic & para-aortic lymphadenectomy, peritoneal biopsies, evacuation of pelvic fluid by Dr. Cole.    FINAL DIAGNOSIS:   A. LEFT OVARY AND FALLOPIAN TUBE, LEFT SALPINGO-OOPHORECTOMY:   - Ovarian clear cell carcinoma   - Background of endometriosis   - Fallopian tube with no significant histologic abnormality.   B. UTERUS, HYSTERECTOMY:   -  Inactive endometrium   -  Myometrium with adenomyosis and leiomyoma.   -  Cervix with squamous metaplasia.   C. PERITONEUM, RIGHT PELVIS, BIOPSY:   - Fibroadipose tissue, negative for malignancy.   D. LYMPH NODES, RIGHT PELVIC, DISSECTION:   - Thirteen benign lymph nodes (0/13).   E. LYMPH NODES, LEFT PELVIC, DISSECTION:   - Seven benign lymph nodes (0/7).   F. PERITONEUM, LEFT PELVIS, BIOPSY:   - Fibroadipose tissue, negative for malignancy.   G. PERITONEUM, BLADDER, BIOPSY:   - Fibroadipose tissue  with acute and chronic inflammation, negative for malignancy.   H. PERITONEUM, POSTERIOR CUL-DE-SAC, BIOPSY:   - Fibroadipose tissue, negative for malignancy.   I. PERITONEUM, LEFT PARACOLIC GUTTER, BIOPSY:   - Fibroadipose tissue, negative for malignancy.   J. LYMPH NODES, LEFT PARA-AORTIC, DISSECTION:   - Five benign lymph nodes (0/5).   K. LYMPH NODES, RIGHT PARA-AORTIC, DISSECTION:   - Two benign lymph nodes (0/2).   L. PERITONEUM, RIGHT PARACOLIC GUTTER, BIOPSY:   - Fibroadipose tissue, negative for malignancy.   M. OMENTUM, OMENTECTOMY:   - Adipose tissue with reactive changes, negative for malignancy.   COMMENT:   The final diagnosis confirms the interpretation provided intraoperatively.   Report Name: Ovary or Fallopian Tube   Status: Submitted    Part(s) Involved:   A: Ovary and fallopian tube, left   Synoptic Report:   CLINICAL   Clinical History:   - Pelvic mass   SPECIMEN   Procedure:   - Left salpingo-oophorectomy   - Hysterectomy   - Omentectomy   - Peritoneal  biopsies   Lymph Node Sampling:   - Performed   Location:   - Pelvic lymph nodes   - Para-aortic lymph nodes   Specimen Integrity:   - Left ovary   Specimen Integrity of Left Ovary:   - Capsule intact   TUMOR   Primary Tumor Site(s):   - Left ovary   Left Ovary   Tumor Size of Left Ovary: 19 cm   Histologic Type:   - Clear cell carcinoma   Tumor Extent   Ovarian Surface Involvement:   - Absent   Specimen(s)   Extent of Left Ovary:   - Involved   Extent of Left Fallopian Tube:   - Not involved   Extent of Omentum:   - Not involved   Extent of Uterus:   - Not involved   Extent of Peritoneum:   - Not involved   Peritoneal Ascitic Fluid:   - Not performed / unknown   Pleural Fluid:   - Not performed / unknown   LYMPH NODES     Number of Pelvic Lymph Nodes Examined: 20     Number of Pelvic Lymph Nodes Involved: None identified     Number of Para-aortic Lymph Nodes Examined: 7     Number of Para-Aortic Lymph Nodes Involved: None identified   STAGE (PTNM, AJCC 7TH ED.)   Primary Tumor (pT):   - Ovary   Ovarian Primary Tumor (pT):   - pT1a: Tumor limited to 1 ovary; capsule intact, no tumor on ovarian surface. No malignant cells in ascites or peritoneal washings#   Regional Lymph Nodes (pN):   - pN0: No regional lymph node metastasis       07/31/17: Dr Shaffer follow up: Discussed with the patient that given the high risk histology, as well as ruptured mass before surgery, she would be qualified at higher risk of recurrence despite early stage ovarian cancer.  Recommended adjuvant chemotherapy. Plan for carboplatin of AUC of 6 and paclitaxel of 175, m2 for 3 cycles. Referral for genetic counselor and will see her back after those 3 cycles  08/03/17: Call from patient stating she is going for a  second opinion and would like chemotherapy rescheduled to week of 8/14/17.      08/16/17: Cycle #1 Carbo/Taxol.  73. Significant paclitaxel reaction.  08/30/17: C1 carboplatin/inpatient paclitaxel desensitization.   09/25/17: C2 carboplatin/paclitaxel- inpatient paclitaxel desensitization.  15.  10/16/17: C3 carboplatin/paclitaxel- switched to docetaxel given her neuropathy.  10.  11/14/17:  8. CT CAP:  IMPRESSION:   1. Post surgical changes of hysterectomy and bilateral  salpingo-oophorectomy. Nodular soft tissue density in the posterior  cul-de-sac along with ill-defined nodular thickening in the left  pelvis, suspicious for residual disease or metastatic deposits.   2. There is a 3 cm mass in the superior pole of the right kidney,  possibly extending into the renal hilum. Represents RCC until proven  otherwise. Consider renal mass protocol CT to assess renal vein  involvement.  3. Stable sub-4 mm pulmonary nodules, continued attention on  follow-up.    11/16/17: CT renal mass protocol  IMPRESSION:  1. Arterially enhancing mass measuring 3.6 x 2.1 x 2.2 cm mass arising  from superior posterior of the right kidney, this is renal cell  carcinoma until proven otherwise.  2. Stable fat-containing umbilical hernia.    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 Margins negative. Three month surveillance plan with [facility].     2/26/18:  14    5/22/18:  11     8/23/18:  11    12/12/18:  10    3/8/19:  12    6/19/19:  13    9/23/19:  12    Past Medical History:   Diagnosis Date     Anxiety state, unspecified     [id number]     Arthritis 2014    vague, gradual, not treated really, knees feel it     Attention deficit disorder without mention of hyperactivity      Cancer (H) 7/2017 and 1/2018    ovarian and kidney cancers, both treated well     Chronic rhinitis      Depressive disorder lifetime    plus Anxiety plus ADD.  Escitalipram, therapy, ADD meds maybe     Depressive disorder, not elsewhere classified      Heart disease 1999    tachycardia and high cholesterol, managed     History of blood transfusion 7/2017    while in hospital for ovarian cancer     Hypertension 1999    tho BP was too LOW last week. past 12 years Generally OK     Panic disorder without agoraphobia      Pure hypercholesterolemia     Lipitor     Tachycardia      Tachycardia, unspecified     9/1999-2/2004     Type II or unspecified type diabetes mellitus without mention of complication, not stated as uncontrolled     diagnosed 2004       Past Surgical History:   Procedure Laterality Date     AS REMV KIDNEY,RADICAL Right      BIOPSY  7/2017 and 2/2018    ovarian and kidney cancer biopsies. also 1987 breast benign     BREAST SURGERY  1987    date unsure. benign breast cyst removed     CATARACT IOL, RT/LT Left 05/18/2018     CATARACT IOL, RT/LT Right 07/1318     COLONOSCOPY  2008 and 2013    polyps removed. Next due fall 2018     HYSTERECTOMY TOTAL ABDOMINAL, BILATERAL SALPINGO-OOPHORECTOMY, NODE DISSECTION, COMBINED Left 7/7/2017    Procedure: COMBINED HYSTERECTOMY TOTAL ABDOMINAL, SALPINGO-OOPHORECTOMY, NODE DISSECTION;  Exploratory Laparotomy, Left Salpingo-Oophorectomy, Cancer Staging, Total Hysterectomy, Omentectomy, Evacuation of abdominal fluid, Lymph Node Dissection  Anesthesia Block ;  Surgeon: Serena Cole MD;  Location: [facility] OR     HYSTERECTOMY, PAP NO LONGER INDICATED  07/07/2017    Laparotomy; for ovarian cancer staging     HYSTERECTOMY, PAP NO LONGER INDICATED       INSERT PORT VASCULAR ACCESS Right 8/21/2017    Procedure: INSERT PORT VASCULAR ACCESS;  Single Lumen Chest Power Port;  Surgeon: Stephen Mike PA-C;  Location: [facility] OR     LYMPHADENECTOMY RETROPERITONEAL Bilateral 07/07/2017    Laparotomy; pelvics & para-aortics; for ovarian cancer staging     OMENTECTOMY  07/07/2017    Laparotomy; for ovarian cancer staging     ORTHOPEDIC  SURGERY  1/2002    broken left jaw due to fall, 4 fractures, titanium plates     PHACOEMULSIFICATION CLEAR CORNEA WITH STANDARD INTRAOCULAR LENS IMPLANT Right 7/13/2018    Procedure: PHACOEMULSIFICATION CLEAR CORNEA WITH STANDARD INTRAOCULAR LENS IMPLANT;  RIght Eye Phacoemulsification Clear Cornea with Standard Intraocular Lens Placement ;  Surgeon: Mary Jo Massey MD;  Location: [facility] OR     PHACOEMULSIFICATION CLEAR CORNEA WITH TORIC INTRAOCULAR LENS IMPLANT Left 5/18/2018    Procedure: PHACOEMULSIFICATION CLEAR CORNEA WITH TORIC INTRAOCULAR LENS IMPLANT;  Left Eye Phacoemulsification with Toric Lens;  Surgeon: Mary Jo Massey MD;  Location: [facility] OR     SALPINGO OOPHORECTOMY,R/L/KAYY Right 2008    Salpingo Oophorectomy, RT     SALPINGO OOPHORECTOMY,R/L/KAYY Left 07/07/2017    Laparotomy; for ovarian cancer staging     SURGICAL HISTORY OF -       facial surgery d/t fall in 1/2002     SURGICAL HISTORY OF -       1985 removal of breast cyst     SURGICAL HISTORY OF -   2008    endometrial ablation     YAG CAPSULOTOMY OD (RIGHT EYE)  10/22/2018       Social History     Socioeconomic History     Marital status: Single     Spouse name: Not on file     Number of children: 0     Years of education: Not on file     Highest education level: Not on file   Occupational History     Employer: [employer]   Social Needs     Financial resource strain: Not on file     Food insecurity:     Worry: Not on file     Inability: Not on file     Transportation needs:     Medical: Not on file     Non-medical: Not on file   Tobacco Use     Smoking status: Never Smoker     Smokeless tobacco: Never Used   Substance and Sexual Activity     Alcohol use: Yes     Comment: Very infrequent, a bit of wine or beer 2x per month maybe     Drug use: Yes     Types: Marijuana     Comment: infrequent, for celebrations only, maybe 8x per year     Sexual activity: Yes     Partners: Male     Birth control/protection: None     Comment: long-time     Lifestyle     Physical activity:     Days per week: Not on file     Minutes per session: Not on file     Stress: Not on file   Relationships     Social connections:     Talks on phone: Not on file     Gets together: Not on file     Attends [employer] service: Not on file     Active member of club or organization: Not on file     Attends meetings of clubs or organizations: Not on file     Relationship status: Not on file     Intimate partner violence:     Fear of current or ex partner: Not on file     Emotionally abused: Not on file     Physically abused: Not on file     Forced sexual activity: Not on file   Other Topics Concern     Parent/sibling w/ CABG, MI or angioplasty before 65F 55M? No   Social History Narrative     Not on file       Family History   Problem Relation Age of Onset     C.A.D. Father      Diabetes Father         Later in his life, in his 70s     Hypertension Father      Cerebrovascular Disease Father         1984 or      Psychotic Disorder Father      Pancreatic Cancer Father      Coronary Artery Disease Father         diagnosed in his late 50s (age)     Hyperlipidemia Father         treated w statins     Other Cancer Father         pancreatic, ,  of this     Depression Father      Mental Illness Father         likely PTSD and depression     Obesity Father      C.A.D. Mother      Hypertension Mother      Breast Cancer Mother         2 times,  and ?     Psychotic Disorder Mother      Colon Cancer Mother         ?     Cancer Mother         Bone cancer     Coronary Artery Disease Mother         diagnosed in her late 70s (age)     Hyperlipidemia Mother         treated w. statins     Other Cancer Mother         bone/marrow, ,  of this     Depression Mother      Anxiety Disorder Mother      Mental Illness Mother         various undiagnosed types, but THERE     Obesity Mother      Prostate Problems Brother         cleared      Hypertension Brother       Hyperlipidemia Brother         unsure if treated w statins     Depression Brother      Anxiety Disorder Brother      Mental Illness Brother         undiagnosed but THERE     Obesity Brother      Psychotic Disorder Sister         x2     Neurologic Disorder Sister      Hypertension Sister      Hyperlipidemia Sister         treated w statins     Depression Sister      Anxiety Disorder Sister      Obesity Sister      Psychotic Disorder Brother         x2     Depression Brother         major depressive disorder and OCD     Anxiety Disorder Brother      Mental Illness Brother         not sure of diagnoses, treated     Hypertension Brother      Hyperlipidemia Brother      Psychotic Disorder Maternal Grandmother         ?     Coronary Artery Disease Maternal Grandmother          of heart attack age 84?     Depression Maternal Grandmother      Psychotic Disorder Maternal Grandfather         ?     Psychotic Disorder Paternal Grandmother         Schizophernia     Coronary Artery Disease Paternal Grandmother          of heart attack, age 85?     Depression Paternal Grandmother         severe, but recovered     Mental Illness Paternal Grandmother         possible bipolar or other     Psychotic Disorder Paternal Grandfather         ?     Respiratory Paternal Grandfather          of emphysema and smoking     Psychotic Disorder Sister      Other - See Comments Sister         small kidney stone      Hypertension Sister      Hyperlipidemia Sister         treated w statins     Depression Sister      Anxiety Disorder Sister      Cancer - colorectal No family hx of      Glaucoma No family hx of      Macular Degeneration No family hx of        Current Outpatient Medications   Medication     Acetaminophen (TYLENOL PO)     atorvastatin (LIPITOR) 80 MG tablet     B Complex Vitamins (VITAMIN B-COMPLEX PO)     CALCIUM-MAGNESIUM-VITAMIN D PO     Cholecalciferol (VITAMIN D) 1000 UNIT capsule     DiphenhydrAMINE HCl (BENADRYL PO)      EPINEPHrine (EPIPEN/ADRENACLICK/OR ANY BX GENERIC EQUIV) 0.3 MG/0.3ML injection 2-pack     escitalopram (LEXAPRO) 20 MG tablet     estradiol (VAGIFEM) 10 MCG TABS vaginal tablet     fluticasone (FLONASE) 50 MCG/ACT spray     lisdexamfetamine (VYVANSE) 50 MG capsule     lisinopril (PRINIVIL/ZESTRIL) 5 MG tablet     LORazepam (ATIVAN) 1 MG tablet     Magnesium Hydroxide (MAGNESIA PO)     metFORMIN (GLUCOPHAGE-XR) 500 MG 24 hr tablet     Multiple Vitamins-Minerals (MULTIVITAMIN ADULT PO)     nitroFURantoin macrocrystal-monohydrate (MACROBID) 100 MG capsule     Probiotic Product (PRO-BIOTIC BLEND PO)     rivaroxaban ANTICOAGULANT (XARELTO) 20 MG TABS tablet     traZODone (DESYREL) 50 MG tablet     No current facility-administered medications for this visit.      Facility-Administered Medications Ordered in Other Visits   Medication     heparin 100 UNIT/ML injection 5 mL          Allergies   Allergen Reactions     Paclitaxel Anaphylaxis and Difficulty breathing     Wasps [Hornets] Anaphylaxis     Yellow Hornet Venom [Hornet Venom] Anaphylaxis and Swelling     Yellow Jacket Venom [Venomil Honey Bee] Anaphylaxis and Swelling     Sulfa Drugs Hives and Rash       Review of Systems  10 point ROS negative other than the symptoms noted above in the HPI.    OBJECTIVE:    Physical Exam:    /73   Pulse 90   Temp 98.4  F (36.9  C) (Oral)   Resp 16   Wt 93.9 kg (207 lb)   LMP 01/01/2009   SpO2 96%   BMI 36.09 kg/m       CONSTITUTIONAL: Alert non-toxic appearing female in no acute distress  HEAD: Normocephalic, atraumatic  NECK: Neck supple without palpable lymphadenopathy  RESPIRATORY: Lungs clear to auscultation, respirations unlabored, occasional dry cough noted  CV: Regular rate and rhythm, S1S2, no clicks, murmurs, rubs, or gallops; bilateral lower extremities without edema, dorsalis pedis pulses 2+ bilaterally  GASTROINTESTINAL: Normoactive bowel sounds x4 quadrants, abdomen soft, non-distended, and non-tender to  palpation without masses or organomegaly  GENITOURINARY: External genitalia and urethral meatus pink without lesions, masses, or excoriation. Vagina pink and smooth without masses or lesions. Vaginal cuff without nodularity, masses, or lesions. Cervix surgically absent. Bimanual exam reveals no masses or fullness. Rectovaginal exam confirms these findings.  LYMPHATIC: Cervical, supraclavicular, and inguinal nodes without lymphadenopathy  MUSCULOSKELETAL: Moves all extremities, no obvious muscle wasting  NEUROLOGIC: No gross deficits, normal gait  SKIN: Appropriate color for race, warm and dry, no rashes or lesions to unclothed skin  PSYCHIATRIC: Pleasant and interactive, affect bright, makes appropriate eye contact, thought process linear      Data:   12      9/27/2019   Hemoglobin 11.7 - 15.7 g/dL 11.5 (A)   Hematocrit 35.0 - 47.0 % 36.3   Platelet Count 150 - 450 10e9/L 262   Absolute Neutrophil 1.6 - 8.3 10e9/L 3.3   Sodium 133 - 144 mmol/L 136   Potassium 3.4 - 5.3 mmol/L 3.9   Chloride 94 - 109 mmol/L 103   Carbon Dioxide 20 - 32 mmol/L 25   Urea Nitrogen 7 - 30 mg/dL 23   Creatinine 0.52 - 1.04 mg/dL 1.15 (A)   Calcium 8.5 - 10.1 mg/dL 9.4   WBC 4.0 - 11.0 10e9/L 5.8     Assessment/Plan:  1) Stage IC2 clear cell ovarian carcinoma: No evidence of recurrent disease. Two years out from treatment, may continue surveillance every six months x3 years (through 10/2022) then annually thereafter with  and pelvic/rectal exam. Continue q6wk port flushes, may consider removal when she would like- discussed her concerns about recurring if she has this removed. Reviewed signs and symptoms  of recurrence including but not limited to bleeding from vagina, bladder, or rectum, bloating, early satiety, swelling in the lower extremities, abdominal or lower back pain, changes in bowel or bladder patterns, shortness of breath, increased fatigue, unexplained weight loss, and night sweats. Reviewed signs and symptoms for  when she should contact the clinic or seek additional care. Patient to contact the clinic with any questions or concerns in the interim.   2) Pulmonary nodules: Stable on imaging 5/2018. Repeat chest CT fall of 2019 for two year stability. Order placed.  3) Recurrent URIs: No fevers/myalgias/arthralgias to suggest influenza.  CBC with diff drawn given her history of carboplatin with recurrent infections- WBC/ANC WNL. Hemoglobin slightly low which may be impacting her fatigue. To follow with PCP for URIs, encouraged supportive cares.  4) Hx of nephrectomy: BMP ordered- creatinine slightly worsened, to follow with her nephrology team  5) Genetic testing: Hafsa is negative for mutations in the AIP, ALK, APC, COSTA, BAP1, BARD1, BLM, BRCA1, BRCA2, BRIP1, BMPR1A, CDH1, CDK4, CDKN1B, CDKN2A, CHEK2, DICER1, EPCAM, FANCC, FH, FLCN, GALNT12, GREM1, HOXB13, MAX, MEN1, MET, MITF, MLH1, MRE11A, MSH2, MSH6, MUTYH, NBN, NF1, NF2, PALB2, PHOX2B, PMS2, POLD1, POLE, POT1, YXFLN4D, PTCH1, PTEN, RAD50, RAD51C, RAD51D, RB1, RET, SDHA, SDHAF2, SDHB, SDHC, SDHD, SMAD4, SMARCA4, SMARCB1, SMARCE1, STK11, SUFU, KZJD681, TP53, TSC1, TSC2, VHL, and XRCC2 genes. No mutations were found in any of the 67 genes analyzed. High risk breast screening.  6) Labs:  12, BMP, CBC pending  7) Health maintenance issues discussed today include to follow up with PCP for co-morbid conditions and non-gynecologic issues. Continue follow up with nephrology. Due for colonoscopy.  8) Patient verbalized understanding of and agreement with plan.    25 minutes face to face time spent with patient, over 50% of which was spent in counseling and coordination of care.    MARLON Ogden, FNP-C  Division of Gynecologic Oncology  [facility]  Pager: [phone]

## 2019-09-27 NOTE — NURSING NOTE
Labs completed via venipuncture, patient discharged.    See flow sheets.    Rebecca Fiore CMA ([facility])

## 2019-09-27 NOTE — PATIENT INSTRUCTIONS
Individualized Surveillance Plan for women  With 20% or greater lifetime risk of breast cancer   Per NCCN Breast Cancer Screening and Diagnosis Guidelines Version 2.2018    Recommended screening Test or procedure Last done Next Scheduled    Clinical encounter Ongoing risk assessment, risk reduction counseling and clinical breast exam, every 6-12 months. Refer to genetic counseling if not already done.   9/26/2019 October 2020   However, some family histories with breast cancers at a very young age, may warrant screening starting earlier.    *May begin at age 40 if breast cancers in the family occur at later ages.    Annual mammogram beginning 10 years younger than the earliest breast cancer in the family but not prior to age 30.    Recommend annual breast MRI to begin 10 years younger than the earliest breast cancer in the family but not prior to age 25.    Breast MRIs are preferably done on day 7-15 of the menstrual cycle in premenopausal women. 9/21/2018: Screening tomosynthesis mammogram, BI-RADS 1.    4/9/2019: Breast MRI, BI-RADS 1. Mammogram today after appt.    Next breast MRI: April 2020    Next exam: October 2020 followed by tomosynthesis mammogram   Breast screening for patients at high risk due to thoracic radiation before 30 years old    Begin 10 years post radiation treatment or age 25.   Annual mammogram beginning 10 years after radiation but not prior to age 30    Annual breast MRI, preferably on day 7-15 of menstrual cycle for premenopausal women.       NA     NA   Women who have a lifetime risk of >20% based on history of LCIS or ADH/ALH Annual screening mammogram beginning at age of LCIS or ADH/ALH but not prior to age 30.    Consider annual MRI to begin at age of diagnosis of LCIS or ADH/ALH but not prior to age 25.    Consider risk reducing strategies.     NA     NA    Recommend risk reducing strategies for women with 1.7% 5 year risk of breast cancer. 10.4% 5 year risk by SHERON model. Discussed  low dose tamoxifen.

## 2019-09-27 NOTE — LETTER
Cancer Risk Management  Program [facility]  [facility]  [facility]  Mailing Address  Cancer Risk Management Program  [facility]  [address] [place] [address]    New patient appointments  [phone]  2019    Hafsa Corona  [address]  [place] [address]      Dear Hafsa,    It was a pleasure meeting with you today.  Below is a copy of my note from our visit, outlining your surveillance plan.      I look forward to seeing you in the future to coordinate your care and reduce your health risks. Please feel free to contact me if you have any questions or concerns.    Oncology Risk Management Consultation:  Date on this visit: 2019    [facility]af[facility] Corona  [facility] for [facility]  follow up visit. She requires evaluation for her risk of cancer secondary to having a family history of breast cancer in her mother at age 63.      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    She is considered to at high risk for breast cancer and has a 30.3% lifetime risk for breast cancer by the SHERON model.  She has a 10.8% 5 year risk by the SHERON model.     Primary Physician:  RUTH ANN Reilly    History Of Present Illness:  Ms. Corona is a very pleasant  64 year old female who presents with family history of breast cancer.     Genetic testin2017 - Negative for mutations in the AIP, ALK, APC, COSTA, BAP1, BARD1, BLM, BRCA1, BRCA2, BRIP1, BMPR1A, CDH1, CDK4, CDKN1B, CDKN2A, CHEK2, DICER1, EPCAM, FANCC, FH, FLCN, GALNT12, GREM1, HOXB13, MAX, MEN1, MET, MITF, MLH1, MRE11A, MSH2, MSH6, MUTYH, NBN, NF1, NF2, PALB2, PHOX2B, PMS2, POLD1, POLE, POT1, DIDNI0P, PTCH1, PTEN, RAD50, RAD51C, RAD51D, RB1, RET, SDHA,  SDHAF2, SDHB, SDHC, SDHD, SMAD4, SMARCA4, SMARCB1, SMARCE1, STK11, SUFU, GHJU716, TP53, TSC1, TSC2, VHL, and XRCC2 genes using a Cancer-Next Expanded panel through [employer].     Pertinent history:  2007- R salpingo oophorectomy for abnormal imaging (enlarged ovary), biopsy showed endometriosis  7/7/2017 - Stage 1C2 Clear cell carcinoma of the L ovary, s/p HELENE/LSO and chemotherapy.  1/24/18: R nephrectomy with Dr. 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   Nulliparous.  Menarche at 10.  Menopause: age 54  Hx of OCPs x6 years.  No hx of HRT.  Density: heterogeneously fibroglandular tissue  No history of breast biopsy.  Hx of breast cyst in 1987, aspirated   No history of hyperplasia, atypia, or malignancy    Pertinent screening history:  6/2011 - Screening mammogram, normal by report   2/1/2014 - Screening mammogram, normal by report  9/15/2017 - Screening mammogram, BiRads1.    4/5/2018 - Breast MRI, BiRads2.  9/21/2018: Screening tomosynthesis mammogram, BI-RADS 1.  4/9/2019: Breast MRI, BI-RADS 1.    At this visit, she denies asymmetry, lumps, masses, thickening, pain, nipple discharge and skin changes.      Past Medical/Surgical History:  Past Medical History:   Diagnosis Date     Anxiety state, unspecified     [id number]     Arthritis 2014    vague, gradual, not treated really, knees feel it     At high risk for breast cancer 09/27/2019    30.3% lifetime risk by SHERON model     Attention deficit disorder without mention of hyperactivity      Cancer (H) 7/2017 and 1/2018    ovarian and kidney cancers, both treated well     Chronic rhinitis      Depressive disorder lifetime    plus Anxiety plus ADD. Escitalipram, therapy, ADD meds maybe     Depressive disorder, not elsewhere classified      Heart disease 1999    tachycardia and high cholesterol, managed     History of blood transfusion 7/2017    while in hospital for ovarian cancer     Hypertension 1999 tho BP was too LOW last week.  past 12 years Generally OK     Panic disorder without agoraphobia      Pure hypercholesterolemia     Lipitor     Tachycardia      Tachycardia, unspecified     9/1999-2/2004     Type II or unspecified type diabetes mellitus without mention of complication, not stated as uncontrolled     diagnosed 2004     Past Surgical History:   Procedure Laterality Date     AS REMV KIDNEY,RADICAL Right      BIOPSY  7/2017 and 2/2018    ovarian and kidney cancer biopsies. also 1987 breast benign     BREAST SURGERY  1987    date unsure. benign breast cyst removed     CATARACT IOL, RT/LT Left 05/18/2018     CATARACT IOL, RT/LT Right 07/1318     COLONOSCOPY  2008 and 2013    polyps removed. Next due fall 2018     HYSTERECTOMY TOTAL ABDOMINAL, BILATERAL SALPINGO-OOPHORECTOMY, NODE DISSECTION, COMBINED Left 7/7/2017    Procedure: COMBINED HYSTERECTOMY TOTAL ABDOMINAL, SALPINGO-OOPHORECTOMY, NODE DISSECTION;  Exploratory Laparotomy, Left Salpingo-Oophorectomy, Cancer Staging, Total Hysterectomy, Omentectomy, Evacuation of abdominal fluid, Lymph Node Dissection  Anesthesia Block ;  Surgeon: Serena Cole MD;  Location: [facility] OR     HYSTERECTOMY, PAP NO LONGER INDICATED  07/07/2017    Laparotomy; for ovarian cancer staging     HYSTERECTOMY, PAP NO LONGER INDICATED       INSERT PORT VASCULAR ACCESS Right 8/21/2017    Procedure: INSERT PORT VASCULAR ACCESS;  Single Lumen Chest Power Port;  Surgeon: Stephen Mike PA-C;  Location: [facility] OR     LYMPHADENECTOMY RETROPERITONEAL Bilateral 07/07/2017    Laparotomy; pelvics & para-aortics; for ovarian cancer staging     OMENTECTOMY  07/07/2017    Laparotomy; for ovarian cancer staging     ORTHOPEDIC SURGERY  1/2002    broken left jaw due to fall, 4 fractures, titanium plates     PHACOEMULSIFICATION CLEAR CORNEA WITH STANDARD INTRAOCULAR LENS IMPLANT Right 7/13/2018    Procedure: PHACOEMULSIFICATION CLEAR CORNEA WITH STANDARD INTRAOCULAR LENS IMPLANT;  RIght Eye Phacoemulsification Clear  Cornea with Standard Intraocular Lens Placement ;  Surgeon: Mary Jo Massey MD;  Location: [facility] OR     PHACOEMULSIFICATION CLEAR CORNEA WITH TORIC INTRAOCULAR LENS IMPLANT Left 5/18/2018    Procedure: PHACOEMULSIFICATION CLEAR CORNEA WITH TORIC INTRAOCULAR LENS IMPLANT;  Left Eye Phacoemulsification with Toric Lens;  Surgeon: Mary Jo Massey MD;  Location: [facility] OR     SALPINGO OOPHORECTOMY,R/L/KAYY Right 2008    Salpingo Oophorectomy, RT     SALPINGO OOPHORECTOMY,R/L/KAYY Left 07/07/2017    Laparotomy; for ovarian cancer staging     SURGICAL HISTORY OF -       facial surgery d/t fall in 1/2002     SURGICAL HISTORY OF -       1985 removal of breast cyst     SURGICAL HISTORY OF -   2008    endometrial ablation     YAG CAPSULOTOMY OD (RIGHT EYE)  10/22/2018       Allergies:  Allergies as of 09/27/2019 - Reviewed 09/27/2019   Allergen Reaction Noted     Paclitaxel Anaphylaxis and Difficulty breathing 08/30/2017     Wasps [hornets] Anaphylaxis 09/03/2009     Yellow hornet venom [hornet venom] Anaphylaxis and Swelling 05/10/2018     Yellow jacket venom [venomil honey bee] Anaphylaxis and Swelling 05/10/2018     Sulfa drugs Hives and Rash 07/19/2005       Current Medications:  Current Outpatient Medications   Medication Sig Dispense Refill     Acetaminophen (TYLENOL PO) Take 500 mg by mouth 2 tabs prn pain (monthly)       atorvastatin (LIPITOR) 80 MG tablet TAKE 1 TABLET BY MOUTH EVERY DAY 90 tablet 0     B Complex Vitamins (VITAMIN B-COMPLEX PO) Take 50 mg by mouth        CALCIUM-MAGNESIUM-VITAMIN D PO Take 2 tablets by mouth daily       Cholecalciferol (VITAMIN D) 1000 UNIT capsule Take 2,000 Units by mouth daily        DiphenhydrAMINE HCl (BENADRYL PO) Take 50 mg by mouth daily as needed (monthly)       EPINEPHrine (EPIPEN/ADRENACLICK/OR ANY BX GENERIC EQUIV) 0.3 MG/0.3ML injection 2-pack Inject 0.3 mLs (0.3 mg) into the muscle once as needed for anaphylaxis 0.3 mL PRN     escitalopram (LEXAPRO) 20 MG tablet Take 1 tablet  (20 mg) by mouth daily 90 tablet 3     fluticasone (FLONASE) 50 MCG/ACT spray SHAKE WELL AND USE 2 SPRAYS IN EACH NOSTRIL DAILY 48 g 3     lisinopril (PRINIVIL/ZESTRIL) 5 MG tablet Take 1 tablet (5 mg) by mouth daily 90 tablet 1     LORazepam (ATIVAN) 1 MG tablet Take 1 tablet (1 mg) by mouth every 6 hours as needed (nausea/vomiting, anxiety or sleep) 30 tablet 1     Magnesium Hydroxide (MAGNESIA PO) Take 500 mg by mouth       metFORMIN (GLUCOPHAGE-XR) 500 MG 24 hr tablet TAKE 2 TABLETS BY MOUTH EVERY DAY WITH THE EVENING MEAL 60 tablet 0     Multiple Vitamins-Minerals (MULTIVITAMIN ADULT PO) Take 1 tablet by mouth daily       nitroFURantoin macrocrystal-monohydrate (MACROBID) 100 MG capsule TK 1 C PO AFTER INTERCOUSE D PRN  0     Probiotic Product (PRO-BIOTIC BLEND PO) Take 1 capsule by mouth daily Enzymatic Therapy-probiotic pearls       rivaroxaban ANTICOAGULANT (XARELTO) 20 MG TABS tablet Take 1 tablet (20 mg) by mouth daily (with dinner) 30 tablet 11     traZODone (DESYREL) 50 MG tablet TAKE 1 TO 2 TABLETS(50  MG) BY MOUTH EVERY NIGHT AS NEEDED FOR SLEEP 180 tablet 1     estradiol (VAGIFEM) 10 MCG TABS vaginal tablet Place 1 tablet (10 mcg) vaginally twice a week (Patient not taking: Reported on 2019) 15 tablet 1     lisdexamfetamine (VYVANSE) 50 MG capsule Take 50 mg by mouth          Family History:  Family History   Problem Relation Age of Onset     C.A.D. Father      Diabetes Father         Later in his life, in his 70s     Hypertension Father      Cerebrovascular Disease Father         1984 or      Psychotic Disorder Father      Pancreatic Cancer Father      Coronary Artery Disease Father         diagnosed in his late 50s (age)     Hyperlipidemia Father         treated w statins     Other Cancer Father         pancreatic, ,  of this     Depression Father      Mental Illness Father         likely PTSD and depression     Obesity Father      C.A.D. Mother      Hypertension Mother       Breast Cancer Mother         2 times,  and ?     Psychotic Disorder Mother      Colon Cancer Mother         ?     Cancer Mother         Bone cancer     Coronary Artery Disease Mother         diagnosed in her late 70s (age)     Hyperlipidemia Mother         treated w. statins     Other Cancer Mother         bone/marrow, ,  of this     Depression Mother      Anxiety Disorder Mother      Mental Illness Mother         various undiagnosed types, but THERE     Obesity Mother      Prostate Problems Brother         cleared      Hypertension Brother      Hyperlipidemia Brother         unsure if treated w statins     Depression Brother      Anxiety Disorder Brother      Mental Illness Brother         undiagnosed but THERE     Obesity Brother      Psychotic Disorder Sister         x2     Neurologic Disorder Sister      Hypertension Sister      Hyperlipidemia Sister         treated w statins     Depression Sister      Anxiety Disorder Sister      Obesity Sister      Psychotic Disorder Brother         x2     Depression Brother         major depressive disorder and OCD     Anxiety Disorder Brother      Mental Illness Brother         not sure of diagnoses, treated     Hypertension Brother      Hyperlipidemia Brother      Psychotic Disorder Maternal Grandmother         ?     Coronary Artery Disease Maternal Grandmother          of heart attack age 84?     Depression Maternal Grandmother      Psychotic Disorder Maternal Grandfather         ?     Psychotic Disorder Paternal Grandmother         Schizophernia     Coronary Artery Disease Paternal Grandmother          of heart attack, age 85?     Depression Paternal Grandmother         severe, but recovered     Mental Illness Paternal Grandmother         possible bipolar or other     Psychotic Disorder Paternal Grandfather         ?     Respiratory Paternal Grandfather          of emphysema and smoking     Psychotic Disorder Sister      Other - See  Comments Sister         small kidney stone      Hypertension Sister      Hyperlipidemia Sister         treated w statins     Depression Sister      Anxiety Disorder Sister      Cancer - colorectal No family hx of      Glaucoma No family hx of      Macular Degeneration No family hx of        Social History:  Social History     Socioeconomic History     Marital status: Single     Spouse name: Not on file     Number of children: 0     Years of education: Not on file     Highest education level: Not on file   Occupational History     Comment: [employer]   Social Needs     Financial resource strain: Not on file     Food insecurity:     Worry: Not on file     Inability: Not on file     Transportation needs:     Medical: Not on file     Non-medical: Not on file   Tobacco Use     Smoking status: Never Smoker     Smokeless tobacco: Never Used   Substance and Sexual Activity     Alcohol use: Yes     Comment: Very infrequent, a bit of wine or beer 2x per month maybe     Drug use: Yes     Types: Marijuana     Comment: infrequent, for celebrations only, maybe 8x per year     Sexual activity: Yes     Partners: Male     Birth control/protection: None     Comment: long-time    Lifestyle     Physical activity:     Days per week: Not on file     Minutes per session: Not on file     Stress: Not on file   Relationships     Social connections:     Talks on phone: Not on file     Gets together: Not on file     Attends Sikhism service: Not on file     Active member of club or organization: Not on file     Attends meetings of clubs or organizations: Not on file     Relationship status: Not on file     Intimate partner violence:     Fear of current or ex partner: Not on file     Emotionally abused: Not on file     Physically abused: Not on file     Forced sexual activity: Not on file   Other Topics Concern     Parent/sibling w/ CABG, MI or angioplasty before 65F 55M? No   Social History Narrative     Not on file        Physical Exam:  /73   Pulse 90   Temp 98.4  F (36.9  C) (Oral)   Resp 16   Wt 93.9 kg (207 lb)   LMP 01/01/2009   SpO2 96%   BMI 36.09 kg/m       GENERAL APPEARANCE: has a cold, wearing a mask to prevent cross infection.      NECK: no adenopathy, no asymmetry or masses     LYMPHATICS: Bilateral enlarged cervical lymph nodes, No supraclavicular or axillary lymphadenopathy     RESP: lungs clear to auscultation - no rales, rhonchi or wheezes     CARDIOVASCULAR: regular rate and rhythm, normal S1 S2, no S3 or S4 and no murmur.   BREAST: A multi positional, bilateral breast exam was performed.  Fairly symmetrical pendulous breasts, Ll>R. Right breast: no palpable dominant masses, no nipple discharge, no skin changes. Soft tissue with minor fibrocystic changes toward anterior level of breast.  Yellowish bruise at 10 o'clock position. Right axilla: no palpable adenopathy. Left breast: no palpable dominant masses, no nipple discharge, no skin changes. Left axilla: no palpable adenopathy. Soft tissue with minor areas of density noted in anterior portion.  SKIN: no suspicious lesions or rashes on anterior or posterior torso, upper extremities and face.     Laboratory/Imaging Studies  Results for orders placed or performed in visit on 09/23/19      Result Value Ref Range     12 0 - 30 U/mL       ASSESSMENT  We discussed her last screening tests and reviewed her family history; there are no changes to report. We reviewed her level of risk and what might be recommended for prevention. We discussed the use of LOWER DOSE TAMOXIFEN for Prevention of breast cancer in women with DCIS, LCIS or atypia on biopsy: Treatment with a lower dose of tamoxifen (5 mg per day) has been shown to reduce the risk for disease recurrence and new disease for women who had been treated with lumpectomy  for ductal carcinoma in situ (DCIS), lobular carcinoma in situ( LCIS) and atypical ductal hyperplasia (ADH) in a  randomized, phase III clinical trial (LOPEZ-01). (Zachary et al 2019). The trial involved 500 women over a period of 5 years. The data was shown to reduce the risk for recurrence or new disease by 52%. This data was presented at the 2018 [place] [facility] Symposium help December 4-8, 2018.  Notably, in this Phase III clinical trial, the data showed that the lower dose of tamoxifen did not cause significant adverse events (blood clots or uterine hyperplasia) or increase menopausal symptoms (hot flashes vaginal dryness, insomnia, loss of libido).     She is currently taking rivaroxaban 20 mg daily, per Dr. William Villarreal, for a history of cancer-related DVTs. I will consult with him to discuss whether he is amenable to her taking low dose tamoxifen, as she is interested in this.    Today, her exam is unremarkable and she will proceed with the following screening plan.     INDIVIDUALIZED CANCER RISK MANAGEMENT PLAN:  Individualized Surveillance Plan for women  With 20% or greater lifetime risk of breast cancer   Per NCCN Breast Cancer Screening and Diagnosis Guidelines Version 2.2018    Recommended screening Test or procedure Last done Next Scheduled    Clinical encounter Ongoing risk assessment, risk reduction counseling and clinical breast exam, every 6-12 months. Refer to genetic counseling if not already done.   9/26/2019 October 2020   However, some family histories with breast cancers at a very young age, may warrant screening starting earlier.    *May begin at age 40 if breast cancers in the family occur at later ages.    Annual mammogram beginning 10 years younger than the earliest breast cancer in the family but not prior to age 30.    Recommend annual breast MRI to begin 10 years younger than the earliest breast cancer in the family but not prior to age 25.    Breast MRIs are preferably done on day 7-15 of the menstrual cycle in premenopausal women. 9/21/2018: Screening tomosynthesis mammogram,  BI-RADS 1.    4/9/2019: Breast MRI, BI-RADS 1. Mammogram today after appt.    Next breast MRI: April 2020    Next exam: October 2020 followed by tomosynthesis mammogram   Breast screening for patients at high risk due to thoracic radiation before 30 years old    Begin 10 years post radiation treatment or age 25.   Annual mammogram beginning 10 years after radiation but not prior to age 30    Annual breast MRI, preferably on day 7-15 of menstrual cycle for premenopausal women.       NA     NA   Women who have a lifetime risk of >20% based on history of LCIS or ADH/ALH Annual screening mammogram beginning at age of LCIS or ADH/ALH but not prior to age 30.    Consider annual MRI to begin at age of diagnosis of LCIS or ADH/ALH but not prior to age 25.    Consider risk reducing strategies.     NA     NA    Recommend risk reducing strategies for women with 1.7% 5 year risk of breast cancer. 10.4% 5 year risk by SHERON model. Discussed low dose tamoxifen.      I spent 30 minutes with the patient with greater that 50% of it in counseling and coordinating care as documented above.    Stephie Karimi, MARLON-CNS, OCN, AGN-BC  Clinical Nurse Specialist  Cancer Risk Management Program  [address]  [place] [address]    phone:  [phone]  Pager: [phone]  fax: [phone]    CC: MD Morelia De La Garza, APRN-NP   Jarod Shaffer MD

## 2019-09-27 NOTE — NURSING NOTE
"Oncology Rooming Note    September 27, 2019 9:43 AM   Hafsa Corona is a 64 year old female who presents for:    Chief Complaint   Patient presents with     Oncology Clinic Visit     Return Ovarian Ca     Initial Vitals: /73   Pulse 90   Temp 98.4  F (36.9  C) (Oral)   Resp 16   Wt 93.9 kg (207 lb)   LMP 01/01/2009   SpO2 96%   BMI 36.09 kg/m   Estimated body mass index is 36.09 kg/m  as calculated from the following:    Height as of 9/12/19: 1.613 m (5' 3.5\").    Weight as of this encounter: 93.9 kg (207 lb). Body surface area is 2.05 meters squared.  Mild Pain (2) Comment: cold symptoms   Patient's last menstrual period was 01/01/2009.  Allergies reviewed: Yes  Medications reviewed: Yes    Medications: Medication refills not needed today.  Pharmacy name entered into [facility]: [id number] - [place], [place] - [address] AT [facility] [address]    Clinical concerns: patient has a cold       Nevaeh Saarbia CMA              "

## 2019-09-28 ENCOUNTER — HEALTH MAINTENANCE LETTER (OUTPATIENT)
Age: 64
End: 2019-09-28

## 2019-10-01 ENCOUNTER — OFFICE VISIT (OUTPATIENT)
Dept: FAMILY MEDICINE | Facility: CLINIC | Age: 64
End: 2019-10-01
Payer: COMMERCIAL

## 2019-10-01 VITALS
DIASTOLIC BLOOD PRESSURE: 60 MMHG | TEMPERATURE: 98.7 F | BODY MASS INDEX: 36.27 KG/M2 | WEIGHT: 208 LBS | SYSTOLIC BLOOD PRESSURE: 98 MMHG | OXYGEN SATURATION: 98 % | HEART RATE: 96 BPM

## 2019-10-01 DIAGNOSIS — E11.9 TYPE 2 DIABETES MELLITUS WITHOUT COMPLICATION, WITHOUT LONG-TERM CURRENT USE OF INSULIN (H): Primary | ICD-10-CM

## 2019-10-01 DIAGNOSIS — F33.0 MAJOR DEPRESSIVE DISORDER, RECURRENT EPISODE, MILD (H): ICD-10-CM

## 2019-10-01 DIAGNOSIS — E78.5 HYPERLIPIDEMIA LDL GOAL <100: ICD-10-CM

## 2019-10-01 DIAGNOSIS — G62.0 PERIPHERAL NEUROPATHY DUE TO CHEMOTHERAPY (H): ICD-10-CM

## 2019-10-01 DIAGNOSIS — G47.00 INSOMNIA, UNSPECIFIED TYPE: ICD-10-CM

## 2019-10-01 DIAGNOSIS — C64.1 RENAL CELL CARCINOMA, RIGHT (H): ICD-10-CM

## 2019-10-01 DIAGNOSIS — I10 BENIGN ESSENTIAL HYPERTENSION: ICD-10-CM

## 2019-10-01 DIAGNOSIS — T45.1X5A PERIPHERAL NEUROPATHY DUE TO CHEMOTHERAPY (H): ICD-10-CM

## 2019-10-01 DIAGNOSIS — Z90.5 S/P NEPHRECTOMY: ICD-10-CM

## 2019-10-01 DIAGNOSIS — E66.01 MORBID OBESITY (H): ICD-10-CM

## 2019-10-01 DIAGNOSIS — J01.00 ACUTE NON-RECURRENT MAXILLARY SINUSITIS: ICD-10-CM

## 2019-10-01 DIAGNOSIS — C56.9 MALIGNANT NEOPLASM OF OVARY, UNSPECIFIED LATERALITY (H): ICD-10-CM

## 2019-10-01 LAB
ALBUMIN UR-MCNC: NEGATIVE MG/DL
APPEARANCE UR: CLEAR
BACTERIA #/AREA URNS HPF: ABNORMAL /HPF
BILIRUB UR QL STRIP: NEGATIVE
COLOR UR AUTO: YELLOW
GLUCOSE UR STRIP-MCNC: NEGATIVE MG/DL
HBA1C MFR BLD: 6 % (ref 0–5.6)
HGB UR QL STRIP: ABNORMAL
KETONES UR STRIP-MCNC: NEGATIVE MG/DL
LEUKOCYTE ESTERASE UR QL STRIP: NEGATIVE
NITRATE UR QL: NEGATIVE
NON-SQ EPI CELLS #/AREA URNS LPF: ABNORMAL /LPF
PH UR STRIP: 6.5 PH (ref 5–7)
RBC #/AREA URNS AUTO: ABNORMAL /HPF
SOURCE: ABNORMAL
SP GR UR STRIP: 1.02 (ref 1–1.03)
UROBILINOGEN UR STRIP-ACNC: 0.2 EU/DL (ref 0.2–1)
WBC #/AREA URNS AUTO: ABNORMAL /HPF

## 2019-10-01 PROCEDURE — 99214 OFFICE O/P EST MOD 30 MIN: CPT | Performed by: NURSE PRACTITIONER

## 2019-10-01 PROCEDURE — 84443 ASSAY THYROID STIM HORMONE: CPT | Performed by: NURSE PRACTITIONER

## 2019-10-01 PROCEDURE — 82043 UR ALBUMIN QUANTITATIVE: CPT | Performed by: NURSE PRACTITIONER

## 2019-10-01 PROCEDURE — 80061 LIPID PANEL: CPT | Performed by: NURSE PRACTITIONER

## 2019-10-01 PROCEDURE — 83036 HEMOGLOBIN GLYCOSYLATED A1C: CPT | Performed by: NURSE PRACTITIONER

## 2019-10-01 PROCEDURE — 99207 C FOOT EXAM  NO CHARGE: CPT | Performed by: NURSE PRACTITIONER

## 2019-10-01 PROCEDURE — 81001 URINALYSIS AUTO W/SCOPE: CPT | Performed by: NURSE PRACTITIONER

## 2019-10-01 PROCEDURE — 36415 COLL VENOUS BLD VENIPUNCTURE: CPT | Performed by: NURSE PRACTITIONER

## 2019-10-01 RX ORDER — ATORVASTATIN CALCIUM 80 MG/1
80 TABLET, FILM COATED ORAL DAILY
Qty: 90 TABLET | Status: SHIPPED | OUTPATIENT
Start: 2019-10-01 | End: 2020-12-01

## 2019-10-01 RX ORDER — TRAZODONE HYDROCHLORIDE 50 MG/1
TABLET, FILM COATED ORAL
Qty: 180 TABLET | Status: SHIPPED | OUTPATIENT
Start: 2019-10-01 | End: 2020-12-01

## 2019-10-01 RX ORDER — LISINOPRIL 5 MG/1
5 TABLET ORAL DAILY
Qty: 90 TABLET | Status: SHIPPED | OUTPATIENT
Start: 2019-10-01 | End: 2020-11-08

## 2019-10-01 RX ORDER — METFORMIN HCL 500 MG
TABLET, EXTENDED RELEASE 24 HR ORAL
Qty: 180 TABLET | Status: SHIPPED | OUTPATIENT
Start: 2019-10-01 | End: 2020-12-01

## 2019-10-01 RX ORDER — ESCITALOPRAM OXALATE 20 MG/1
20 TABLET ORAL DAILY
Qty: 90 TABLET | Refills: 3 | Status: SHIPPED | OUTPATIENT
Start: 2019-10-01 | End: 2019-12-31

## 2019-10-01 NOTE — PROGRESS NOTES
Subjective   There may be a medication of preventative does of Tamoxifen added in the future   Hafsa Corona is a 64 year old female who presents to clinic today for the following health issues:    HPI     Saw oncologist on Friday      She has had a URI for the past month.  Current symptoms include nasal congestion, mild sinus pain, post nasal drainage and cough.  She has had symptoms for over a week.  She does have a history of chronic sinusitis.  She denies any fevers.    Her mood has been stable.  She is doing well on her current medications.    Her blood pressure has been well-controlled.     Diabetes Follow-up    BP Readings from Last 2 Encounters:   10/01/19 98/60   09/27/19 126/73     Hemoglobin A1C (%)   Date Value   10/01/2019 6.0 (H)   05/10/2018 5.6     LDL Cholesterol Calculated (mg/dL)   Date Value   11/01/2017 70   09/06/2016 78       Diabetes Management Resources                    Reviewed and updated as needed this visit by Provider  Tobacco  Allergies  Meds  Problems  Med Hx  Surg Hx  Fam Hx         Review of Systems   ROS COMP: CONSTITUTIONAL: NEGATIVE for fever, chills, change in weight  ENT/MOUTH: see HPI  RESP:see HPI  CV: NEGATIVE for chest pain, palpitations or peripheral edema  GI: NEGATIVE for nausea, abdominal pain, heartburn, or change in bowel habits  MUSCULOSKELETAL: NEGATIVE for significant arthralgias or myalgia  NEURO: NEGATIVE for weakness, dizziness or paresthesias  ENDOCRINE: NEGATIVE for temperature intolerance, skin/hair changes  PSYCHIATRIC: NEGATIVE for changes in mood or affect      Objective    BP 98/60   Pulse 96   Temp 98.7  F (37.1  C) (Oral)   Wt 94.3 kg (208 lb)   LMP 01/01/2009   SpO2 98%   BMI 36.27 kg/m    Body mass index is 36.27 kg/m .  Physical Exam   GENERAL: healthy, alert and no distress  HENT: normal cephalic/atraumatic, ear canals and TM's normal, nose and mouth without ulcers or lesions, oropharynx clear, oral mucous membranes moist and sinuses:  maxillary tenderness on bilaterally  NECK: no adenopathy, no asymmetry, masses, or scars and thyroid normal to palpation  RESP: lungs clear to auscultation - no rales, rhonchi or wheezes  CV: regular rate and rhythm, normal S1 S2, no S3 or S4, no murmur, click or rub, no peripheral edema and peripheral pulses strong  ABDOMEN: soft, nontender, no hepatosplenomegaly, no masses and bowel sounds normal  MS: no gross musculoskeletal defects noted, no edema  NEURO: Normal strength and tone, mentation intact and speech normal  PSYCH: mentation appears normal, affect normal/bright    Diagnostic Test Results:  Labs reviewed in Epic  Results for orders placed or performed in visit on 10/01/19 (from the past 24 hour(s))   Hemoglobin A1c   Result Value Ref Range    Hemoglobin A1C 6.0 (H) 0 - 5.6 %   *UA reflex to Microscopic   Result Value Ref Range    Color Urine Yellow     Appearance Urine Clear     Glucose Urine Negative NEG^Negative mg/dL    Bilirubin Urine Negative NEG^Negative    Ketones Urine Negative NEG^Negative mg/dL    Specific Gravity Urine 1.020 1.003 - 1.035    Blood Urine Trace (A) NEG^Negative    pH Urine 6.5 5.0 - 7.0 pH    Protein Albumin Urine Negative NEG^Negative mg/dL    Urobilinogen Urine 0.2 0.2 - 1.0 EU/dL    Nitrite Urine Negative NEG^Negative    Leukocyte Esterase Urine Negative NEG^Negative    Source Midstream Urine    Urine Microscopic   Result Value Ref Range    WBC Urine 0 - 5 OTO5^0 - 5 /HPF    RBC Urine O - 2 OTO2^O - 2 /HPF    Squamous Epithelial /LPF Urine Few FEW^Few /LPF    Bacteria Urine Few (A) NEG^Negative /HPF           Assessment & Plan     1. Type 2 diabetes mellitus without complication, without long-term current use of insulin (H)  At goal  The current medical regimen is effective;  continue present plan and medications.   Follow up in 6 months.   - metFORMIN (GLUCOPHAGE-XR) 500 MG 24 hr tablet; TAKE 2 TABLETS BY MOUTH EVERY DAY WITH THE EVENING MEAL  Dispense: 180 tablet; Refill:  PRN  - Hemoglobin A1c  - TSH with free T4 reflex  - Lipid panel reflex to direct LDL Fasting  - Albumin Random Urine Quantitative with Creat Ratio  - Urine Microscopic  - ASPIRIN NOT PRESCRIBED (INTENTIONAL); Please choose reason not prescribed, below  - FOOT EXAM    2. Hyperlipidemia LDL goal <100  The current medical regimen is effective;  continue present plan and medications.   - atorvastatin (LIPITOR) 80 MG tablet; Take 1 tablet (80 mg) by mouth daily  Dispense: 90 tablet; Refill: PRN    3. Major depressive disorder, recurrent episode, mild (H)  In remission  The current medical regimen is effective;  continue present plan and medications.   - escitalopram (LEXAPRO) 20 MG tablet; Take 1 tablet (20 mg) by mouth daily  Dispense: 90 tablet; Refill: 3    4. Benign essential hypertension  At goal  The current medical regimen is effective;  continue present plan and medications.   Bmp was recently done and is stable.   - lisinopril (PRINIVIL/ZESTRIL) 5 MG tablet; Take 1 tablet (5 mg) by mouth daily  Dispense: 90 tablet; Refill: PRN    5. Insomnia, unspecified type  The current medical regimen is effective;  continue present plan and medications.   - traZODone (DESYREL) 50 MG tablet; TAKE 1 TO 2 TABLETS(50  MG) BY MOUTH EVERY NIGHT AS NEEDED FOR SLEEP  Dispense: 180 tablet; Refill: PRN    6. Renal cell carcinoma, right (H)  stable  - *UA reflex to Microscopic    7. S/p nephrectomy  Kidney function is stable.  - *UA reflex to Microscopic    8. Acute non-recurrent maxillary sinusitis  Augmentin 875 mg BID X 10 days.     9. BMI > 35  Continue to work on diet and exercise.     10. Malignant neoplasm of ovary, unspecified laterality (H)  Continue to follow up with oncology.     11. 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)  Stable  The current medical regimen is effective;  continue present plan and medications.        BMI:   Estimated body mass index is 36.27 kg/m  as calculated from the following:    Height as  "of 9/12/19: 1.613 m (5' 3.5\").    Weight as of this encounter: 94.3 kg (208 lb).   Weight management plan: Discussed healthy diet and exercise guidelines            Return in about 6 months (around 4/1/2020).    Morelia Ayon NP  [facility]        "

## 2019-10-01 NOTE — PROGRESS NOTES
[facility]  [address] [place] [address], [facility] [address], [place] [address]  Phone: [phone], Fax: [phone].      Outpatient Visit Note:    Patient: Hafsa Corona  MRN: [id number]  : 1955  CLIFF: Sep 12, 2019      Reason for Visit:  Follow up for distal DVT, cancer-associated    Forward History:  Presented 2017 with LLE pain and swelling, found to have a distal DVT  2017 switched from warfarin to rivaroxaban, plan for 3 months of therapy and consider duration of therapy  2017 found to have new ovarian mass and underwent ex lap for resection of what was found to have stage 1C2 clear cell carcinoma.  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  She is doing well with no complaints. She notes no bleeding associated with her use of anticoagulation.  She notes no leg swelling or pain.  No chest pain, dyspnea on exertion.  She currently is without any evidence of active cancer and doing very well.    Medications:  Current Outpatient Medications   Medication Sig Dispense Refill     Acetaminophen (TYLENOL PO) Take 500 mg by mouth 2 tabs prn pain (monthly)       atorvastatin (LIPITOR) 80 MG tablet TAKE 1 TABLET BY MOUTH EVERY DAY 90 tablet 0     B Complex Vitamins (VITAMIN B-COMPLEX PO) Take 50 mg by mouth        CALCIUM-MAGNESIUM-VITAMIN D PO Take 2 tablets by mouth daily       Cholecalciferol (VITAMIN D) 1000 UNIT capsule Take 2,000 Units by mouth daily        DiphenhydrAMINE HCl (BENADRYL PO) Take 50 mg by mouth daily as needed (monthly)       escitalopram (LEXAPRO) 20 MG tablet Take 1 tablet (20 mg) by mouth daily 90 tablet 3     fluticasone (FLONASE) 50 MCG/ACT spray  SHAKE WELL AND USE 2 SPRAYS IN EACH NOSTRIL DAILY 48 g 3     lisinopril (PRINIVIL/ZESTRIL) 5 MG tablet Take 1 tablet (5 mg) by mouth daily 90 tablet 1     Magnesium Hydroxide (MAGNESIA PO) Take 500 mg by mouth       metFORMIN (GLUCOPHAGE-XR) 500 MG 24 hr tablet TAKE 2 TABLETS BY MOUTH EVERY DAY WITH THE EVENING MEAL 60 tablet 0     Multiple Vitamins-Minerals (MULTIVITAMIN ADULT PO) Take 1 tablet by mouth daily       nitroFURantoin macrocrystal-monohydrate (MACROBID) 100 MG capsule TK 1 C PO AFTER INTERCOUSE D PRN  0     Probiotic Product (PRO-BIOTIC BLEND PO) Take 1 capsule by mouth daily Enzymatic Therapy-probiotic pearls       rivaroxaban ANTICOAGULANT (XARELTO) 20 MG TABS tablet Take 1 tablet (20 mg) by mouth daily (with dinner) 30 tablet 11     EPINEPHrine (EPIPEN/ADRENACLICK/OR ANY BX GENERIC EQUIV) 0.3 MG/0.3ML injection 2-pack Inject 0.3 mLs (0.3 mg) into the muscle once as needed for anaphylaxis 0.3 mL PRN     estradiol (VAGIFEM) 10 MCG TABS vaginal tablet Place 1 tablet (10 mcg) vaginally twice a week (Patient not taking: Reported on 9/27/2019) 15 tablet 1     lisdexamfetamine (VYVANSE) 50 MG capsule Take 50 mg by mouth       LORazepam (ATIVAN) 1 MG tablet Take 1 tablet (1 mg) by mouth every 6 hours as needed (nausea/vomiting, anxiety or sleep) 30 tablet 1     traZODone (DESYREL) 50 MG tablet TAKE 1 TO 2 TABLETS(50  MG) BY MOUTH EVERY NIGHT AS NEEDED FOR SLEEP 180 tablet 1        Allergies:  Allergies   Allergen Reactions     Paclitaxel Anaphylaxis and Difficulty breathing     Wasps [Hornets] Anaphylaxis     Yellow Hornet Venom [Hornet Venom] Anaphylaxis and Swelling     Yellow Jacket Venom [Venomil Honey Bee] Anaphylaxis and Swelling     Sulfa Drugs Hives and Rash       ROS:  A 14 point ROS is negative except as stated in the HPI    Objective:  Vitals: B/P: 124/84, T: 98.5, P: 83, R: Data Unavailable, Wt: 205 lbs 0 oz  Exam:   Gen: Appears well, no distress  HEENT: no scleral icterus or hemorrhage,  no wet purpura, no lymphadenopathy  Ext: trace edema, she has some persistent anterior tibial hemosiderosis that is unchanged but no erythema. varicosities    Labs:  Results for HAFSA BRISENO (MRN [id number]) as of 10/1/2019 14:07   Ref. Range 9/27/2019 13:38   Creatinine Latest Ref Range: 0.52 - 1.04 mg/dL 1.15 (H)   GFR Estimate Latest Ref Range: >60 mL/min/1.73_m2 50 (L)       Imaging:  none    Assessment:  In summary, Hafsa Briseno is a 64 year old woman with cancer-associated distal DVT, now in remission after treatment with surgery and chemotherapy but continues on secondary prophylaxis with rivaroxaban.    Plan:  1. Majority of today's visit was spent counseling the patient regarding cancer its association with risk for VTE.  2. We discussed stopping rivaroxaban since her cancer is in remission but she would to continue for another year due to her anxiety about another DVT and that she has done very well on anticoagulation.  She will continue rivaroxaban 20 mg daily as secondary prophylaxis for her cancer-associated distal DVT for another year.        The patient is given our center's contact information and is instructed to call if she should have any further questions or concerns.  Otherwise, we will plan on seeing her back in 1 year.      Total Time Spent:  I spent a total of 25 minutes face-to-face with Hafsa Briseno during today's office visit.  Over 50% of this time was spent counseling the patient and/or coordinating care regarding cancer-associated VTE.      William Villarreal MD   of Medicine  [facility] [employer] [employer]

## 2019-10-02 LAB
CHOLEST SERPL-MCNC: 164 MG/DL
CREAT UR-MCNC: 143 MG/DL
HDLC SERPL-MCNC: 53 MG/DL
LDLC SERPL CALC-MCNC: 76 MG/DL
MICROALBUMIN UR-MCNC: 6 MG/L
MICROALBUMIN/CREAT UR: 3.87 MG/G CR (ref 0–25)
NONHDLC SERPL-MCNC: 111 MG/DL
TRIGL SERPL-MCNC: 177 MG/DL
TSH SERPL DL<=0.005 MIU/L-ACNC: 1.33 MU/L (ref 0.4–4)

## 2019-10-08 ENCOUNTER — TELEPHONE (OUTPATIENT)
Dept: ONCOLOGY | Facility: CLINIC | Age: 64
End: 2019-10-08

## 2019-10-09 NOTE — MR AVS SNAPSHOT
After Visit Summary   8/11/2017    Hafsa Corona    MRN: [id number]           Patient Information     Date Of Birth          1955        Visit Information        Provider Department      8/11/2017 2:00 PM William Villarreal MD [facility]        Today's Diagnoses     Long-term (current) use of anticoagulants        Acute deep vein thrombosis (DVT) of left lower extremity, unspecified vein (H)           Follow-ups after your visit        Follow-up notes from your care team     Return in about 1 year (around 8/11/2018).      Your next 10 appointments already scheduled     Aug 16, 2017  7:30 AM CDT   [facility] Draw with [facility] DRAW   [facility] Draw ([facility])    [address] [place] [id number]   [phone]            Aug 16, 2017  8:00 AM CDT   (Arrive by 7:45 AM)   Return Active Treatment with MARLON Limon CNP   [facility] ([facility])    [address] [place] [address]   [phone]            Aug 16, 2017  9:00 AM CDT   Infusion 360 with  ONCOLOGY INFUSION, UC 11 ATC   [facility] ([facility])    [address] [place] [address]   [phone]            Aug 21, 2017  3:00 PM CDT   (Arrive by 2:45 PM)   MA SCREENING DIGITAL BILATERAL with [id number]   [facility] ([facility])    [address] [place] [id number] [phone]           Do not use any powder, lotion or deodorant under your arms or on your breast. If you do, we will ask you to remove it before your exam.  Wear comfortable, two-piece clothing.  If you have any allergies, tell your care team.  Bring any previous mammograms from other facilities or have them mailed to the breast center. Three-dimensional (3D) mammograms are  -Resolved.  -Continue to monitor.  -Discontinue IV fluids.   "available at [facility] locations in [place], [place], [place], [place], [facility], and [place]. [facility] locations include [place] and [facility] in [place]. Benefits of 3D mammograms include: - Improved rate of cancer detection - Decreases your chance of having to go back for more tests, which means fewer: - \"False-positive\" results (This means that there is an abnormal area but it isn't cancer.) - Invasive testing procedures, such as a biopsy or surgery - Can provide clearer images of the breast if you have dense breast tissue. 3D mammography is an optional exam that anyone can have with a 2D mammogram. It doesn't replace or take the place of a 2D mammogram. 2D mammograms remain an effective screening test for all women.  Not all insurance companies cover the cost of a 3D mammogram. Check with your insurance.            Sep 01, 2017  2:15 PM CDT   (Arrive by 2:00 PM)   NEW WITH ROOM with Mariam Sams GC,  2 114 CONSULT [facility] ([facility])    [address] [place] [id number] [phone]            Sep 12, 2017  3:00 PM CDT   (Arrive by 2:45 PM)   NEW WITH ROOM with Lola Vasquez,  ,  2 114 CONSULT [facility] ([facility])    [address] [place] [id number] [phone]            Sep 21, 2017 11:00 AM CDT   New Visit with Lucina Reyes [facility] ([facility])    [address] [place] [address] [phone]              Who to contact     If you have questions or need follow up information about today's clinic visit or your schedule please contact [facility] directly at [phone].  Normal or non-critical lab and imaging results will be communicated to you by MyChart, letter or phone within 4 business days after the clinic has received the " "results. If you do not hear from us within 7 days, please contact the clinic through [facility] or phone. If you have a critical or abnormal lab result, we will notify you by phone as soon as possible.  Submit refill requests through [facility] or call your pharmacy and they will forward the refill request to us. Please allow 3 business days for your refill to be completed.          Additional Information About Your Visit        Wrnch Information     [facility] lets you send messages to your doctor, view your test results, renew your prescriptions, schedule appointments and more. To sign up, go to [email or web address] . Click on \"Log in\" on the left side of the screen, which will take you to the Welcome page. Then click on \"Sign up Now\" on the right side of the page.     You will be asked to enter the access code listed below, as well as some personal information. Please follow the directions to create your username and password.     Your access code is: [email or web address]  Expires: 10/10/2017 11:16 AM     Your access code will  in 90 days. If you need help or a new code, please call your [facility] clinic or [phone].        Care EveryWhere ID     This is your Care EveryWhere ID. This could be used by other organizations to access your [facility] medical records  [id number]        Your Vitals Were     Pulse Temperature Height Pulse Oximetry BMI (Body Mass Index)       83 98.5  F (36.9  C) (Oral) 1.6 m (5' 3\") 95% 32.15 kg/m2        Blood Pressure from Last 3 Encounters:   17 124/84   17 101/68   17 124/77    Weight from Last 3 Encounters:   17 82.3 kg (181 lb 8 oz)   17 83.9 kg (185 lb)   17 86.8 kg (191 lb 6.4 oz)              Today, you had the following     No orders found for display         Today's Medication Changes          These changes are accurate as of: 17 11:59 PM.  If you have any questions, ask your nurse or doctor.               These medicines have changed or " have updated prescriptions.        Dose/Directions    metFORMIN 500 MG 24 hr tablet   Commonly known as:  GLUCOPHAGE-XR   This may have changed:    - how much to take  - when to take this  - additional instructions   Used for:  Type 2 diabetes mellitus without complication, without long-term current use of insulin (H)        Take two tabs by mouth once daily with evening meal.   Quantity:  180 tablet   Refills:  PRN            Where to get your medicines      These medications were sent to [place], [place] - [address]  [address], [facility] [address]    Hours:  TRANSPLANT PHONE NUMBER [phone] Phone:  [phone]     rivaroxaban ANTICOAGULANT 20 MG Tabs tablet                Primary Care Provider Office Phone # Fax #    Morelia Ayon [phone] [address] [facility] [address]        Equal Access to Services     RONEY ARANDA : Hadii serena joyner hadasho Soomaali, waaxda luqadaha, qaybta kaalmada adeegyada, sujatha farmerin hayalessandro betts . So [facility] [phone].    ATENCIÓN: Si habla español, tiene a martínez disposición servicios gratuitos de asistencia lingüística. Benjamíneugenio al [phone].    We comply with applicable federal civil rights laws and [place] laws. We do not discriminate on the basis of race, color, national origin, age, disability sex, sexual orientation or gender identity.            Thank you!     Thank you for choosing [facility]  for your care. Our goal is always to provide you with excellent care. Hearing back from our patients is one way we can continue to improve our services. Please take a few minutes to complete the written survey that you may receive in the mail after your visit with us. Thank you!             Your Updated Medication List - Protect others around you: Learn how to safely use, store and throw away your medicines at www.disposemymeds.org.          This  list is accurate as of: 8/11/17 11:59 PM.  Always use your most recent med list.                   Brand Name Dispense Instructions for use Diagnosis    acetaminophen 325 MG tablet    TYLENOL    100 tablet    Take 2 tablets (650 mg) by mouth every 6 hours as needed for mild pain    Cellulitis and abscess of leg, Mass of pelvis       albuterol 108 (90 BASE) MCG/ACT Inhaler    albuterol    1 Inhaler    Inhale 2 puffs into the lungs 4 times daily as needed for shortness of breath / dyspnea    Cough       atorvastatin 80 MG tablet    LIPITOR    90 tablet    Take 1 tablet (80 mg) by mouth daily    Hyperlipidemia LDL goal <100       CALCIUM-MAGNESIUM-VITAMIN D PO      Take 2 tablets by mouth daily        EPINEPHrine 0.3 MG/0.3ML injection 2-pack    EPIPEN/ADRENACLICK/or ANY BX GENERIC EQUIV    1 each    Inject 0.3 mLs (0.3 mg) into the muscle once as needed for anaphylaxis    Bee allergy status       escitalopram 20 MG tablet    LEXAPRO    30 tablet    Take 1 tablet (20 mg) by mouth daily    Major depressive disorder, recurrent episode, mild (H)       fluticasone 50 MCG/ACT spray    FLONASE    48 g    Spray 2 sprays into both nostrils daily    Chronic maxillary sinusitis       lisinopril 10 MG tablet    PRINIVIL/ZESTRIL    90 tablet    Take 1 tablet (10 mg) by mouth daily    Essential hypertension       metFORMIN 500 MG 24 hr tablet    GLUCOPHAGE-XR    180 tablet    Take two tabs by mouth once daily with evening meal.    Type 2 diabetes mellitus without complication, without long-term current use of insulin (H)       MULTIVITAMIN ADULT PO      Take 1 tablet by mouth daily        order for DME     1 Units    Equipment being ordered: blood pressure monitor    Essential hypertension       * order for DME     1 each    Compression stockings 20-30 mm Hg. To be wear when directed by Hematology team and while awake for the first two years post clot.    Long-term (current) use of anticoagulants, Acute deep vein thrombosis (DVT)  "of left lower extremity, unspecified vein (H)       * order for DME     1 Bottle    Plain packing strip 1/4\"    Abscess of leg       * order for DME     1 Box    Sterile zoey tipped applicators    Abscess of leg       polyethylene glycol Packet    MIRALAX/GLYCOLAX    7 packet    Take 17 g by mouth daily    Mass of pelvis       PRO-BIOTIC BLEND PO      Take 1 capsule by mouth daily Enzymatic Therapy-probiotic pearls        rivaroxaban ANTICOAGULANT 20 MG Tabs tablet    XARELTO    30 tablet    Take 1 tablet (20 mg) by mouth daily (with dinner)    Long-term (current) use of anticoagulants, Acute deep vein thrombosis (DVT) of left lower extremity, unspecified vein (H)       senna-docusate 8.6-50 MG per tablet    SENOKOT-S;PERICOLACE    100 tablet    Take 2 tablets by mouth 2 times daily Start with 1 tablet PO BID, If no bowel movement in 24 hours, increase to 2 tablets PO BID.  Hold for loose stools.    Mass of pelvis       traZODone 50 MG tablet    DESYREL    180 tablet    TAKE 1 TO 2 TABLETS(50  MG) BY MOUTH EVERY NIGHT AS NEEDED FOR SLEEP    Insomnia, unspecified type       vitamin B complex with vitamin C Tabs tablet      Take 1 tablet by mouth daily        vitamin D 1000 UNITS capsule      Take 2,000 Units by mouth daily        VYVANSE PO      Take 50 mg by mouth daily        * Notice:  This list has 3 medication(s) that are the same as other medications prescribed for you. Read the directions carefully, and ask your doctor or other care provider to review them with you.      "

## 2019-10-10 ENCOUNTER — MYC REFILL (OUTPATIENT)
Dept: FAMILY MEDICINE | Facility: CLINIC | Age: 64
End: 2019-10-10

## 2019-10-10 DIAGNOSIS — Z91.89 AT HIGH RISK FOR BREAST CANCER: Primary | ICD-10-CM

## 2019-10-10 RX ORDER — TAMOXIFEN CITRATE 10 MG/1
TABLET ORAL
Qty: 45 TABLET | Refills: 3 | Status: SHIPPED | OUTPATIENT
Start: 2019-10-10 | End: 2021-04-01

## 2019-10-10 NOTE — PROGRESS NOTES
Cj Hafsa E.  MRN: [id number]    Orders for [place] [facility]  Phone [phone]   Fax [phone]     1.  Okay to hold Xarelto for 3 days prior to colonoscopy.  Restart 12 -24 hours after procedure.    William Villarreal MD, Director, [facility]     Nevaeh Travis RN, MSN, Nurse Clinician, [facility] [phone]

## 2019-11-01 ENCOUNTER — OFFICE VISIT (OUTPATIENT)
Dept: URGENT CARE | Facility: URGENT CARE | Age: 64
End: 2019-11-01
Payer: COMMERCIAL

## 2019-11-01 VITALS
HEIGHT: 63 IN | RESPIRATION RATE: 12 BRPM | DIASTOLIC BLOOD PRESSURE: 70 MMHG | TEMPERATURE: 99 F | BODY MASS INDEX: 36.86 KG/M2 | SYSTOLIC BLOOD PRESSURE: 102 MMHG | WEIGHT: 208 LBS | HEART RATE: 100 BPM

## 2019-11-01 DIAGNOSIS — J06.9 VIRAL URI: ICD-10-CM

## 2019-11-01 DIAGNOSIS — R07.0 THROAT PAIN: Primary | ICD-10-CM

## 2019-11-01 LAB
DEPRECATED S PYO AG THROAT QL EIA: NORMAL
SPECIMEN SOURCE: NORMAL

## 2019-11-01 PROCEDURE — 87081 CULTURE SCREEN ONLY: CPT | Performed by: PREVENTIVE MEDICINE

## 2019-11-01 PROCEDURE — 87880 STREP A ASSAY W/OPTIC: CPT | Performed by: PREVENTIVE MEDICINE

## 2019-11-01 PROCEDURE — 99213 OFFICE O/P EST LOW 20 MIN: CPT | Performed by: PREVENTIVE MEDICINE

## 2019-11-01 ASSESSMENT — MIFFLIN-ST. JEOR: SCORE: 1461.57

## 2019-11-02 LAB
BACTERIA SPEC CULT: NORMAL
SPECIMEN SOURCE: NORMAL

## 2019-11-04 NOTE — PATIENT INSTRUCTIONS
Viral upper respiratory illness     PLAN:  Tylenol, Fluids, Rest, Saline gargles and Saline nasal spray

## 2019-11-04 NOTE — PROGRESS NOTES
SUBJECTIVE:   Hafsa Corona is a 64 year old female presenting with a chief complaint of runny nose and stuffy nose.  Onset of symptoms was 3 day(s) ago.  Course of illness is waxing and waning.    Severity moderate  Current and Associated symptoms: runny nose and stuffy nose  Treatment measures tried include Tylenol/Ibuprofen and Decongestants.  Predisposing factors include None.    Past Medical History:   Diagnosis Date     Anxiety state, unspecified     [id number]     Arthritis 2014    vague, gradual, not treated really, knees feel it     At high risk for breast cancer 09/27/2019    30.3% lifetime risk by SHERON model     Attention deficit disorder without mention of hyperactivity      Cancer (H) 7/2017 and 1/2018    ovarian and kidney cancers, both treated well     Chronic rhinitis      Depressive disorder lifetime    plus Anxiety plus ADD. Escitalipram, therapy, ADD meds maybe     Depressive disorder, not elsewhere classified      Heart disease 1999    tachycardia and high cholesterol, managed     History of blood transfusion 7/2017    while in hospital for ovarian cancer     Hypertension 1999    tho BP was too LOW last week. past 12 years Generally OK     Panic disorder without agoraphobia      Pure hypercholesterolemia     Lipitor     Tachycardia      Tachycardia, unspecified     9/1999-2/2004     Type II or unspecified type diabetes mellitus without mention of complication, not stated as uncontrolled     diagnosed 2004     Current Outpatient Medications   Medication Sig Dispense Refill     Acetaminophen (TYLENOL PO) Take 500 mg by mouth 2 tabs prn pain (monthly)       atorvastatin (LIPITOR) 80 MG tablet Take 1 tablet (80 mg) by mouth daily 90 tablet PRN     B Complex Vitamins (VITAMIN B-COMPLEX PO) Take 50 mg by mouth        CALCIUM-MAGNESIUM-VITAMIN D PO Take 2 tablets by mouth daily       Cholecalciferol (VITAMIN D) 1000 UNIT capsule Take 2,000 Units by mouth daily        escitalopram (LEXAPRO) 20 MG tablet  Take 1 tablet (20 mg) by mouth daily 90 tablet 3     fluticasone (FLONASE) 50 MCG/ACT spray SHAKE WELL AND USE 2 SPRAYS IN EACH NOSTRIL DAILY 48 g 3     lisinopril (PRINIVIL/ZESTRIL) 5 MG tablet Take 1 tablet (5 mg) by mouth daily 90 tablet PRN     Magnesium Hydroxide (MAGNESIA PO) Take 500 mg by mouth       metFORMIN (GLUCOPHAGE-XR) 500 MG 24 hr tablet TAKE 2 TABLETS BY MOUTH EVERY DAY WITH THE EVENING MEAL 180 tablet PRN     Multiple Vitamins-Minerals (MULTIVITAMIN ADULT PO) Take 1 tablet by mouth daily       Probiotic Product (PRO-BIOTIC BLEND PO) Take 1 capsule by mouth daily Enzymatic Therapy-probiotic pearls       rivaroxaban ANTICOAGULANT (XARELTO) 20 MG TABS tablet Take 1 tablet (20 mg) by mouth daily (with dinner) 30 tablet 11     tamoxifen (NOLVADEX) 10 MG tablet Cut each pill in 1/2 with pill cutter to ensure 5 mg daily dose. 45 tablet 3     traZODone (DESYREL) 50 MG tablet TAKE 1 TO 2 TABLETS(50  MG) BY MOUTH EVERY NIGHT AS NEEDED FOR SLEEP 180 tablet PRN     amoxicillin-clavulanate (AUGMENTIN) 875-125 MG tablet Take 1 tablet by mouth 2 times daily 20 tablet 1     ASPIRIN NOT PRESCRIBED (INTENTIONAL) Please choose reason not prescribed, below       DiphenhydrAMINE HCl (BENADRYL PO) Take 50 mg by mouth daily as needed (monthly)       EPINEPHrine (EPIPEN/ADRENACLICK/OR ANY BX GENERIC EQUIV) 0.3 MG/0.3ML injection 2-pack Inject 0.3 mLs (0.3 mg) into the muscle once as needed for anaphylaxis 0.3 mL PRN     lisdexamfetamine (VYVANSE) 50 MG capsule Take 50 mg by mouth       LORazepam (ATIVAN) 1 MG tablet Take 1 tablet (1 mg) by mouth every 6 hours as needed (nausea/vomiting, anxiety or sleep) 30 tablet 1     nitroFURantoin macrocrystal-monohydrate (MACROBID) 100 MG capsule TK 1 C PO AFTER INTERCOUSE D PRN  0     Social History     Tobacco Use     Smoking status: Never Smoker     Smokeless tobacco: Never Used   Substance Use Topics     Alcohol use: Yes     Comment: Very infrequent, a bit of wine or beer  "2x per month maybe       ROS:  CONSTITUTIONAL:NEGATIVE for fever, chills, change in weight  INTEGUMENTARY/SKIN: NEGATIVE for worrisome rashes, moles or lesions  EYES: NEGATIVE for vision changes or irritation  RESP:NEGATIVE for significant cough or SOB  CV: NEGATIVE for chest pain, palpitations or peripheral edema  GI: NEGATIVE for nausea, abdominal pain, heartburn, or change in bowel habits  MUSCULOSKELETAL: NEGATIVE for significant arthralgias or myalgia  NEURO: NEGATIVE for weakness, dizziness or paresthesias    OBJECTIVE:  /70   Pulse 100   Temp 99  F (37.2  C) (Oral)   Resp 12   Ht 1.607 m (5' 3.25\")   Wt 94.3 kg (208 lb)   LMP 01/01/2009   BMI 36.55 kg/m    GENERAL APPEARANCE: healthy, alert and no distress  EYES: EOMI,  PERRL, conjunctiva clear  HENT: ear canals and TM's normal.  Nose and mouth without ulcers, erythema or lesions  NECK: supple, nontender, no lymphadenopathy  RESP: lungs clear to auscultation - no rales, rhonchi or wheezes  CV: regular rates and rhythm, normal S1 S2, no murmur noted  ABDOMEN:  soft, nontender, no HSM or masses and bowel sounds normal  NEURO: Normal strength and tone, sensory exam grossly normal,  normal speech and mentation  SKIN: no suspicious lesions or rashes    Rapid strep negative    ASSESSMENT:  Viral upper respiratory illness    PLAN:  Tylenol, Fluids, Rest, Saline gargles and Saline nasal spray, fluticasone nasal spray  Follow up if not improving in 10 days.  See orders in Epic    "

## 2019-11-08 ENCOUNTER — ANCILLARY PROCEDURE (OUTPATIENT)
Dept: CT IMAGING | Facility: CLINIC | Age: 64
End: 2019-11-08
Attending: NURSE PRACTITIONER
Payer: COMMERCIAL

## 2019-11-08 DIAGNOSIS — R91.8 PULMONARY NODULES: ICD-10-CM

## 2019-11-09 NOTE — TELEPHONE ENCOUNTER
Requested Prescriptions   Pending Prescriptions Disp Refills     amoxicillin-clavulanate (AUGMENTIN) 875-125 MG tablet [Pharmacy Med Name: AMOX-CLAV 875MG TABLETS] 20 tablet 0     Sig: TAKE 1 TABLET BY MOUTH TWICE DAILY         Last Written Prescription Date:  10/10/2019  Last Fill Quantity: 20 tabs,   # refills: 1  Last Office Visit: 10/1/2019  Future Office visit:       Routing refill request to provider for review/approval because:  Drug not on the G, P or [facility] refill protocol or controlled substance

## 2019-11-11 NOTE — TELEPHONE ENCOUNTER
LM to return clinic phone call. Patient needs to be seen if she requested this medication (Pharmacy may have requested automatically).    Sending patient a Remark message.         Kassidy DARLING     [facility]

## 2019-11-11 NOTE — TELEPHONE ENCOUNTER
Team Coordinators: please contact patient, did she request refill or was this pharmacy generated? If she wants refill of antibiotics, she should be seen    Thank You!  Annie Morales, JOE  Triage Nurse

## 2019-11-13 ENCOUNTER — MYC MEDICAL ADVICE (OUTPATIENT)
Dept: FAMILY MEDICINE | Facility: CLINIC | Age: 64
End: 2019-11-13

## 2019-11-13 DIAGNOSIS — J01.90 ACUTE SINUSITIS, RECURRENCE NOT SPECIFIED, UNSPECIFIED LOCATION: ICD-10-CM

## 2019-11-13 NOTE — TELEPHONE ENCOUNTER
Patient sent a [employer] message today, see encounter.    Routing to nurse team due to pending medication.    Freda Mcgraw

## 2019-11-15 NOTE — TELEPHONE ENCOUNTER
Refused Prescriptions:                       Disp   Refills    amoxicillin-clavulanate (AUGMENTIN) 875-12*0.1 ta*0        Sig: TAKE 1 TABLET BY MOUTH TWICE DAILY  Refused By: ZACK GARCIA  Reason for Refusal: Duplicate

## 2019-12-27 DIAGNOSIS — F33.0 MAJOR DEPRESSIVE DISORDER, RECURRENT EPISODE, MILD (H): ICD-10-CM

## 2019-12-27 NOTE — TELEPHONE ENCOUNTER
"Requested Prescriptions   Pending Prescriptions Disp Refills     escitalopram (LEXAPRO) 20 MG tablet [Pharmacy Med Name: ESCITALOPRAM 20MG TABLETS]  This maybe a DUPLICATE.   Last Written Prescription Date:  10-1-19  Last Fill Quantity: 90 tab,  # refills: 3   Last office visit: 10/1/2019 with prescribing provider:  Melly Ayon    Future Office Visit:   90 tablet 3     Sig: TAKE 1 TABLET(20 MG) BY MOUTH DAILY       SSRIs Protocol Failed - 12/27/2019  3:58 AM        Failed - PHQ-9 score less than 5 in past 6 months     Please review last PHQ-9 score.   PHQ-9 SCORE 5/26/2011 1/15/2014 9/6/2016   PHQ-9 Total Score 5 8 -   PHQ-9 Total Score - - 6     No flowsheet data found.        Passed - Medication is active on med list        Passed - Patient is age 18 or older        Passed - No active pregnancy on record        Passed - No positive pregnancy test in last 12 months        Passed - Recent (6 mo) or future (30 days) visit within the authorizing provider's specialty     Patient had office visit in the last 6 months or has a visit in the next 30 days with authorizing provider or within the authorizing provider's specialty.  See \"Patient Info\" tab in inbasket, or \"Choose Columns\" in Meds & Orders section of the refill encounter.             "

## 2019-12-28 RX ORDER — ESCITALOPRAM OXALATE 20 MG/1
TABLET ORAL
Qty: 0.1 TABLET | Refills: 0 | OUTPATIENT
Start: 2019-12-28

## 2019-12-28 NOTE — TELEPHONE ENCOUNTER
Refused Prescriptions:                       Disp   Refills    escitalopram (LEXAPRO) 20 MG tablet [Pharm*0.1 ta*0        Sig: TAKE 1 TABLET(20 MG) BY MOUTH DAILY  Refused By: ZACK GARCIA  Reason for Refusal: Duplicate

## 2019-12-30 ENCOUNTER — MYC MEDICAL ADVICE (OUTPATIENT)
Dept: FAMILY MEDICINE | Facility: CLINIC | Age: 64
End: 2019-12-30

## 2019-12-30 DIAGNOSIS — Z90.5 SOLITARY KIDNEY, ACQUIRED: Primary | ICD-10-CM

## 2019-12-30 DIAGNOSIS — Z87.440 HISTORY OF RECURRENT URINARY TRACT INFECTION: ICD-10-CM

## 2019-12-30 DIAGNOSIS — F33.0 MAJOR DEPRESSIVE DISORDER, RECURRENT EPISODE, MILD (H): ICD-10-CM

## 2019-12-30 NOTE — TELEPHONE ENCOUNTER
No, she does not need to be seen for every request.  The refill request included a warning that it was contraindicated due to her estimate creatinine clearance.  I did not fill it and wanted her to check with her kidney team to make sure they were fine with it.  This message must not have been relayed to her.

## 2019-12-30 NOTE — TELEPHONE ENCOUNTER
LOV: 10/01/2019      Spunds like nitroFURantoin macrocrystal-monohydrate (MACROBID) 100 MG capsule is a consistent med for this patient?? Please advise. So does patient need to be seen for every refill requested??      Medical hx:  6. Renal cell carcinoma, right (H)  stable  - *UA reflex to Microscopic     7. S/p nephrectomy  Kidney function is stable.  - *UA reflex to Microscopic      Thanks! Kristal Capps RN

## 2019-12-31 ENCOUNTER — TELEPHONE (OUTPATIENT)
Dept: ONCOLOGY | Facility: CLINIC | Age: 64
End: 2019-12-31

## 2019-12-31 RX ORDER — NITROFURANTOIN 25; 75 MG/1; MG/1
CAPSULE ORAL
Qty: 30 CAPSULE | Status: SHIPPED | OUTPATIENT
Start: 2019-12-31 | End: 2020-12-01

## 2019-12-31 RX ORDER — ESCITALOPRAM OXALATE 20 MG/1
20 TABLET ORAL DAILY
Qty: 90 TABLET | Refills: 3 | Status: SHIPPED | OUTPATIENT
Start: 2019-12-31 | End: 2020-12-01

## 2019-12-31 NOTE — TELEPHONE ENCOUNTER
Writer notes patient has renal cell carcinoma - managed by [facility] - MARLON Ogden CNP - [facility] - [phone]     Pema - oncologist nurse - states patient is seen for surveillance every 3 months cancer not active - BMP not recent - discussed provider out of clinic and would most likely have little input - recommended PCP recheck labs and make decision      Writer notes nephrology - [facility] office visit 5/31/18     She did have recent kidney function testing performed in her local community. This shows a near normal creatinine but an EGFR in the high 40s range. The urine analysis will appears to be in a dipstick urinalysis. History as trace protein in the urine. The hemoglobin was low in the past but is improving. I note an outside calcium that was within normal as well. All of this is reassuring. I did have a lengthy discussion with Ms. Corona and we talked about the fact of a Uni nephrectomy on renal function and how adaptation of the remaining kidney is affected by body habitus H and other factors. At this time I am reassured but I would encourage Ms. puga to continue to be vigilant with her health. I would recommend an attempt at gradual weight loss. I would recommend avoiding any nephrotoxic agents. With this degree of kidney function she can receive gadolinium for her breast scanning without difficulty. If her kidney function does decrease we need to ascertain this further. Blood pressure is excellent today. We did talk that is not too low as low she does not have difficulty with ambulating or dizziness and lightheadedness that puts her at risk for fall. The ACE-inhibitor that she was placed on by her local physician is in excellent agent use in the setting of a solitary kidney. She appears to be tolerating it well and will have benefit in the long run. She is also on a high dose statin which is beneficial 100 agree with continuing this. We talked about  calcium supplementation and the need to monitor the calcium intake in the diet and if she is receiving adequate calcium in the diet then that is favorable. It is always better to receive the calcium in the diet rather than the supplement. This point I have elected not to check any other laboratories apart from male out 24 hr urine for creatinine clearance, total protein, albumin given her baseline of with her function is at this time. I provided her with prescriptions for a BMP and admitted creatinine ratio use every quarter for the next year. Hopefully this will reassure her that her kidney function is stable and that she is doing well.      I did send patient mychart message with provider recommendations as well

## 2019-12-31 NOTE — TELEPHONE ENCOUNTER
Patient has multiple questions and requests and is now reporting new symptoms of lymphadenopathy - triaged but ultimately recommend office visit to review orders, vaccines, medications, symptoms, recommendations, etc

## 2019-12-31 NOTE — TELEPHONE ENCOUNTER
RN was contacted by Morelia Ayon's NP office.     Provider has concern about continuing on Macrobid due to elevated creatinine. However, no recent creatinine has been completed.     Of note: patient is in surveillance and not in active treatment. Last seen in clinic 9/27/2019. Creatinine was elevated at that time. Per Augustina Noriega NP note: Please follow up with your kidney team.       Patient also has had renal cell carcinoma that was treated by [facility]. She continues to follow up with them for this.     RN recommended that patient have a creatinine re-drawn to see if this is still elevated.     Morelia Lehman RN

## 2019-12-31 NOTE — TELEPHONE ENCOUNTER
Routing to provider - Nany - please review and advise as appropriate    Care everywhere showing patient hasn't been seen in over a year do you think [facility] team will provide input?  Do you want lab recheck?

## 2020-01-01 NOTE — TELEPHONE ENCOUNTER
Lab ordered.   She can get pneumovax when she is here.  Shingrix has to be done at a pharmacy - Medicare will not cover in the clinic.

## 2020-01-03 ENCOUNTER — OFFICE VISIT (OUTPATIENT)
Dept: FAMILY MEDICINE | Facility: CLINIC | Age: 65
End: 2020-01-03
Payer: COMMERCIAL

## 2020-01-03 ENCOUNTER — ANCILLARY PROCEDURE (OUTPATIENT)
Dept: GENERAL RADIOLOGY | Facility: CLINIC | Age: 65
End: 2020-01-03
Attending: NURSE PRACTITIONER
Payer: COMMERCIAL

## 2020-01-03 VITALS
SYSTOLIC BLOOD PRESSURE: 101 MMHG | DIASTOLIC BLOOD PRESSURE: 71 MMHG | WEIGHT: 203 LBS | HEIGHT: 63 IN | OXYGEN SATURATION: 98 % | HEART RATE: 85 BPM | BODY MASS INDEX: 35.97 KG/M2 | TEMPERATURE: 98.1 F

## 2020-01-03 DIAGNOSIS — Z90.5 SOLITARY KIDNEY, ACQUIRED: ICD-10-CM

## 2020-01-03 DIAGNOSIS — R22.1 NECK SWELLING: Primary | ICD-10-CM

## 2020-01-03 LAB
ALBUMIN SERPL-MCNC: 3.7 G/DL (ref 3.4–5)
ALP SERPL-CCNC: 77 U/L (ref 40–150)
ALT SERPL W P-5'-P-CCNC: 19 U/L (ref 0–50)
ANION GAP SERPL CALCULATED.3IONS-SCNC: 8 MMOL/L (ref 3–14)
AST SERPL W P-5'-P-CCNC: 19 U/L (ref 0–45)
BILIRUB SERPL-MCNC: 0.5 MG/DL (ref 0.2–1.3)
BUN SERPL-MCNC: 24 MG/DL (ref 7–30)
CALCIUM SERPL-MCNC: 9.5 MG/DL (ref 8.5–10.1)
CHLORIDE SERPL-SCNC: 106 MMOL/L (ref 94–109)
CO2 SERPL-SCNC: 24 MMOL/L (ref 20–32)
CREAT SERPL-MCNC: 1.24 MG/DL (ref 0.52–1.04)
ERYTHROCYTE [DISTWIDTH] IN BLOOD BY AUTOMATED COUNT: 13.5 % (ref 10–15)
GFR SERPL CREATININE-BSD FRML MDRD: 45 ML/MIN/{1.73_M2}
GLUCOSE SERPL-MCNC: 118 MG/DL (ref 70–99)
HCT VFR BLD AUTO: 36.8 % (ref 35–47)
HGB BLD-MCNC: 11.8 G/DL (ref 11.7–15.7)
MCH RBC QN AUTO: 29.1 PG (ref 26.5–33)
MCHC RBC AUTO-ENTMCNC: 32.1 G/DL (ref 31.5–36.5)
MCV RBC AUTO: 91 FL (ref 78–100)
PLATELET # BLD AUTO: 317 10E9/L (ref 150–450)
POTASSIUM SERPL-SCNC: 4.1 MMOL/L (ref 3.4–5.3)
PROT SERPL-MCNC: 7.3 G/DL (ref 6.8–8.8)
RBC # BLD AUTO: 4.05 10E12/L (ref 3.8–5.2)
SODIUM SERPL-SCNC: 138 MMOL/L (ref 133–144)
WBC # BLD AUTO: 7.7 10E9/L (ref 4–11)

## 2020-01-03 PROCEDURE — 86481 TB AG RESPONSE T-CELL SUSP: CPT | Performed by: NURSE PRACTITIONER

## 2020-01-03 PROCEDURE — 99214 OFFICE O/P EST MOD 30 MIN: CPT | Performed by: NURSE PRACTITIONER

## 2020-01-03 PROCEDURE — 80053 COMPREHEN METABOLIC PANEL: CPT | Performed by: NURSE PRACTITIONER

## 2020-01-03 PROCEDURE — 85027 COMPLETE CBC AUTOMATED: CPT | Performed by: NURSE PRACTITIONER

## 2020-01-03 PROCEDURE — 36415 COLL VENOUS BLD VENIPUNCTURE: CPT | Performed by: NURSE PRACTITIONER

## 2020-01-03 PROCEDURE — 71046 X-RAY EXAM CHEST 2 VIEWS: CPT

## 2020-01-03 ASSESSMENT — MIFFLIN-ST. JEOR: SCORE: 1434.93

## 2020-01-03 NOTE — PROGRESS NOTES
"Subjective     Hafsa Corona is a 64 year old female who presents to clinic today for the following health issues:    HPI   Swelling in left shoulder for X 4 to 6 days.  nontender  Popped up out of nowhere  No specific injury or trauma  Not red or hot  Has not been ill recently.    No ill exposures  No recent foreign travel  Denies cough, fevers or chills and night sweats  No recent weight loss.    No other lumps or bumps on her body.        Also would like Follow up on kidney labs for her PCP.         Reviewed and updated as needed this visit by Provider  Tobacco  Allergies  Meds  Problems  Med Hx  Surg Hx  Fam Hx         Review of Systems   ROS COMP: Constitutional, HEENT, cardiovascular, pulmonary, gi and gu systems are negative, except as otherwise noted.      Objective    /71   Pulse 85   Temp 98.1  F (36.7  C)   Ht 1.6 m (5' 3\")   Wt 92.1 kg (203 lb)   LMP 01/01/2009   SpO2 98%   BMI 35.96 kg/m    Body mass index is 35.96 kg/m .  Physical Exam   GENERAL: healthy, alert and no distress  EYES: Eyes grossly normal to inspection, PERRL and conjunctivae and sclerae normal  HENT: ear canals and TM's normal, nose and mouth without ulcers or lesions  NECK: mass left subclavicular swelling and thyroid normal to palpation  RESP: lungs clear to auscultation - no rales, rhonchi or wheezes  CV: regular rate and rhythm, normal S1 S2, no S3 or S4, no murmur, click or rub, no peripheral edema and peripheral pulses strong  MS: no gross musculoskeletal defects noted, no edema  SKIN: no suspicious lesions or rashes  LYMPH: flesh toned subclavicular mass/4 cm soft fluctuant mass, nontender to touch      diagnostic Test Results (Optional):[id number]:   Results for orders placed or performed in visit on 01/03/20   CBC with platelets     Status: None   Result Value Ref Range    WBC 7.7 4.0 - 11.0 10e9/L    RBC Count 4.05 3.8 - 5.2 10e12/L    Hemoglobin 11.8 11.7 - 15.7 g/dL    Hematocrit 36.8 35.0 - 47.0 %    MCV 91 " 78 - 100 fl    MCH 29.1 26.5 - 33.0 pg    MCHC 32.1 31.5 - 36.5 g/dL    RDW 13.5 10.0 - 15.0 %    Platelet Count 317 150 - 450 10e9/L   Comprehensive metabolic panel     Status: Abnormal   Result Value Ref Range    Sodium 138 133 - 144 mmol/L    Potassium 4.1 3.4 - 5.3 mmol/L    Chloride 106 94 - 109 mmol/L    Carbon Dioxide 24 20 - 32 mmol/L    Anion Gap 8 3 - 14 mmol/L    Glucose 118 (H) 70 - 99 mg/dL    Urea Nitrogen 24 7 - 30 mg/dL    Creatinine 1.24 (H) 0.52 - 1.04 mg/dL    GFR Estimate 45 (L) >60 mL/min/[1.73_m2]    GFR Estimate If Black 52 (L) >60 mL/min/[1.73_m2]    Calcium 9.5 8.5 - 10.1 mg/dL    Bilirubin Total 0.5 0.2 - 1.3 mg/dL    Albumin 3.7 3.4 - 5.0 g/dL    Protein Total 7.3 6.8 - 8.8 g/dL    Alkaline Phosphatase 77 40 - 150 U/L    ALT 19 0 - 50 U/L    AST 19 0 - 45 U/L   Quantiferon TB Gold Plus     Status: None   Result Value Ref Range    Quantiferon-TB Gold Plus Result Negative NEG^Negative    TB1 Ag minus Nil Value 0.01 IU/mL    TB2 Ag minus Nil Value 0.00 IU/mL    Mitogen minus Nil Result 6.22 IU/mL    Nil Result 0.03 IU/mL          Assessment & Plan       ICD-10-CM    1. Neck swelling R22.1 XR Chest 2 Views     US Head Neck Soft Tissue     CBC with platelets     Quantiferon TB Gold Plus   2. Solitary kidney, acquired Z90.5 Comprehensive metabolic panel      will get labs to r/o TB or infection as a cause for localized swelling  Labs pending.  Plan to get US if persists or worsens.        No follow-ups on file.    MARLON Hussein [facility]

## 2020-01-06 ENCOUNTER — TRANSFERRED RECORDS (OUTPATIENT)
Dept: HEALTH INFORMATION MANAGEMENT | Facility: CLINIC | Age: 65
End: 2020-01-06

## 2020-01-06 LAB
GAMMA INTERFERON BACKGROUND BLD IA-ACNC: 0.03 IU/ML
M TB IFN-G BLD-IMP: NEGATIVE
M TB IFN-G CD4+ BCKGRND COR BLD-ACNC: 6.22 IU/ML
MITOGEN IGNF BCKGRD COR BLD-ACNC: 0 IU/ML
MITOGEN IGNF BCKGRD COR BLD-ACNC: 0.01 IU/ML

## 2020-01-13 PROCEDURE — 76536 US EXAM OF HEAD AND NECK: CPT | Performed by: EMERGENCY MEDICINE

## 2020-01-13 PROCEDURE — 99284 EMERGENCY DEPT VISIT MOD MDM: CPT | Mod: 25 | Performed by: EMERGENCY MEDICINE

## 2020-01-13 PROCEDURE — 76536 US EXAM OF HEAD AND NECK: CPT | Mod: 26 | Performed by: EMERGENCY MEDICINE

## 2020-01-13 ASSESSMENT — MIFFLIN-ST. JEOR: SCORE: 1434.93

## 2020-01-14 ENCOUNTER — HOSPITAL ENCOUNTER (EMERGENCY)
Facility: CLINIC | Age: 65
Discharge: HOME | End: 2020-01-14
Attending: EMERGENCY MEDICINE | Admitting: EMERGENCY MEDICINE
Payer: COMMERCIAL

## 2020-01-14 VITALS
HEIGHT: 63 IN | TEMPERATURE: 98.9 F | SYSTOLIC BLOOD PRESSURE: 125 MMHG | WEIGHT: 203 LBS | DIASTOLIC BLOOD PRESSURE: 84 MMHG | RESPIRATION RATE: 16 BRPM | HEART RATE: 92 BPM | OXYGEN SATURATION: 93 % | BODY MASS INDEX: 35.97 KG/M2

## 2020-01-14 DIAGNOSIS — R22.1 NECK MASS: ICD-10-CM

## 2020-01-14 DIAGNOSIS — R10.84 ABDOMINAL PAIN, GENERALIZED: ICD-10-CM

## 2020-01-14 LAB
ALBUMIN SERPL-MCNC: 3.4 G/DL (ref 3.4–5)
ALBUMIN UR-MCNC: NEGATIVE MG/DL
ALP SERPL-CCNC: 71 U/L (ref 40–150)
ALT SERPL W P-5'-P-CCNC: 18 U/L (ref 0–50)
ANION GAP SERPL CALCULATED.3IONS-SCNC: 4 MMOL/L (ref 3–14)
APPEARANCE UR: ABNORMAL
AST SERPL W P-5'-P-CCNC: 12 U/L (ref 0–45)
BACTERIA #/AREA URNS HPF: ABNORMAL /HPF
BASOPHILS # BLD AUTO: 0 10E9/L (ref 0–0.2)
BASOPHILS NFR BLD AUTO: 0.3 %
BILIRUB SERPL-MCNC: 0.3 MG/DL (ref 0.2–1.3)
BILIRUB UR QL STRIP: NEGATIVE
BUN SERPL-MCNC: 35 MG/DL (ref 7–30)
CALCIUM SERPL-MCNC: 9.1 MG/DL (ref 8.5–10.1)
CHLORIDE SERPL-SCNC: 109 MMOL/L (ref 94–109)
CO2 SERPL-SCNC: 30 MMOL/L (ref 20–32)
COLOR UR AUTO: YELLOW
CREAT SERPL-MCNC: 1.3 MG/DL (ref 0.52–1.04)
DIFFERENTIAL METHOD BLD: ABNORMAL
EOSINOPHIL # BLD AUTO: 0.1 10E9/L (ref 0–0.7)
EOSINOPHIL NFR BLD AUTO: 0.7 %
ERYTHROCYTE [DISTWIDTH] IN BLOOD BY AUTOMATED COUNT: 13.5 % (ref 10–15)
GFR SERPL CREATININE-BSD FRML MDRD: 43 ML/MIN/{1.73_M2}
GLUCOSE SERPL-MCNC: 147 MG/DL (ref 70–99)
GLUCOSE UR STRIP-MCNC: NEGATIVE MG/DL
HCT VFR BLD AUTO: 35.2 % (ref 35–47)
HGB BLD-MCNC: 11.1 G/DL (ref 11.7–15.7)
HGB UR QL STRIP: NEGATIVE
IMM GRANULOCYTES # BLD: 0 10E9/L (ref 0–0.4)
IMM GRANULOCYTES NFR BLD: 0.3 %
KETONES UR STRIP-MCNC: NEGATIVE MG/DL
LEUKOCYTE ESTERASE UR QL STRIP: NEGATIVE
LIPASE SERPL-CCNC: 233 U/L (ref 73–393)
LYMPHOCYTES # BLD AUTO: 1.9 10E9/L (ref 0.8–5.3)
LYMPHOCYTES NFR BLD AUTO: 28.8 %
MCH RBC QN AUTO: 28 PG (ref 26.5–33)
MCHC RBC AUTO-ENTMCNC: 31.5 G/DL (ref 31.5–36.5)
MCV RBC AUTO: 89 FL (ref 78–100)
MONOCYTES # BLD AUTO: 0.5 10E9/L (ref 0–1.3)
MONOCYTES NFR BLD AUTO: 8.1 %
NEUTROPHILS # BLD AUTO: 4.1 10E9/L (ref 1.6–8.3)
NEUTROPHILS NFR BLD AUTO: 61.8 %
NITRATE UR QL: NEGATIVE
NRBC # BLD AUTO: 0 10*3/UL
NRBC BLD AUTO-RTO: 0 /100
PH UR STRIP: 8 PH (ref 5–7)
PLATELET # BLD AUTO: 249 10E9/L (ref 150–450)
POTASSIUM SERPL-SCNC: 3.8 MMOL/L (ref 3.4–5.3)
PROT SERPL-MCNC: 7 G/DL (ref 6.8–8.8)
RBC # BLD AUTO: 3.96 10E12/L (ref 3.8–5.2)
RBC #/AREA URNS AUTO: 0 /HPF (ref 0–2)
SODIUM SERPL-SCNC: 143 MMOL/L (ref 133–144)
SOURCE: ABNORMAL
SP GR UR STRIP: 1.02 (ref 1–1.03)
SQUAMOUS #/AREA URNS AUTO: 1 /HPF (ref 0–1)
TRANS CELLS #/AREA URNS HPF: <1 /HPF (ref 0–1)
UROBILINOGEN UR STRIP-MCNC: NORMAL MG/DL (ref 0–2)
WBC # BLD AUTO: 6.7 10E9/L (ref 4–11)
WBC #/AREA URNS AUTO: 0 /HPF (ref 0–5)

## 2020-01-14 PROCEDURE — 81001 URINALYSIS AUTO W/SCOPE: CPT | Performed by: EMERGENCY MEDICINE

## 2020-01-14 PROCEDURE — 85025 COMPLETE CBC W/AUTO DIFF WBC: CPT | Performed by: EMERGENCY MEDICINE

## 2020-01-14 PROCEDURE — 80053 COMPREHEN METABOLIC PANEL: CPT | Performed by: EMERGENCY MEDICINE

## 2020-01-14 PROCEDURE — 83690 ASSAY OF LIPASE: CPT | Performed by: EMERGENCY MEDICINE

## 2020-01-14 ASSESSMENT — ENCOUNTER SYMPTOMS
SHORTNESS OF BREATH: 0
FLANK PAIN: 0
ABDOMINAL PAIN: 1
VOMITING: 0
NECK STIFFNESS: 0
CHILLS: 0
SORE THROAT: 0
NECK PAIN: 0
FEVER: 0
NAUSEA: 0
DIARRHEA: 0
DYSURIA: 0

## 2020-01-14 NOTE — ED TRIAGE NOTES
Patient presents via EMS for c/o abdominal pain. Patient states she developed abdominal pain after eating chinese food this evening. Patient states pain has now abated. Denies N/V/D. EKG NSR per EMS.

## 2020-01-14 NOTE — ED AVS SNAPSHOT
[facility], [place], Emergency Department  [address] [place] [id number]  Phone:  [phone]                                    Hafsa Corona   MRN: [id number]    Department:  [facility], Emergency Department   Date of Visit:  1/13/2020           After Visit Summary Signature Page    I have received my discharge instructions, and my questions have been answered. I have discussed any challenges I see with this plan with the nurse or doctor.    ..........................................................................................................................................  Patient/Patient Representative Signature      ..........................................................................................................................................  Patient Representative Print Name and Relationship to Patient    ..................................................               ................................................  Date                                   Time    ..........................................................................................................................................  Reviewed by Signature/Title    ...................................................              ..............................................  Date                                               Time          22EPIC Rev 08/18

## 2020-01-14 NOTE — DISCHARGE INSTRUCTIONS
Please follow-up with your primary care provider regarding the mass in your neck in addition to abdominal pain.    Return if your pain is worsening or if you are unable to tolerate food or drink

## 2020-01-14 NOTE — ED PROVIDER NOTES
"    [place] EMERGENCY DEPARTMENT ([facility])  1/13/20   History     Chief Complaint   Patient presents with     Abdominal Pain     The history is provided by the patient and medical records.     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  She is that she had an episode of abdominal pain in the middle of her abdomen, she had to go to the bathroom, she then had a normal bowel movement that was not black or bloody.  Pain subsided shortly thereafter.  She has been drinking fluids without difficulty.  She denies nausea or vomiting.  She has had no recent fevers, chills, chest pain or shortness of breath.  She denies diaphoresis or dizziness.    Patient also says she has a lump on the left side of her neck that has been present for 2 weeks, the swelled up acutely but then has improved.  It does not hurt, it is not been red or warm.  She denies drainage.    I have reviewed the Medications, Allergies, Past Medical and Surgical History, and Social History in the Epic system.    Review of Systems   Constitutional: Negative for chills and fever.   HENT: Negative for sore throat.    Eyes: Negative for visual disturbance.   Respiratory: Negative for shortness of breath.    Cardiovascular: Negative for chest pain.   Gastrointestinal: Positive for abdominal pain. Negative for diarrhea, nausea and vomiting.   Genitourinary: Negative for dysuria, flank pain, vaginal bleeding and vaginal discharge.   Musculoskeletal: Negative for neck pain and neck stiffness.   Skin: Negative for rash.       Physical Exam   BP: 118/67  Pulse: 93  Temp: 98.9  F (37.2  C)  Resp: 16  Height: 160 cm (5' 3\")  Weight: 92.1 kg (203 " lb)  SpO2: 95 %      Physical Exam  Physical Exam   Constitutional: oriented to person, place, and time. appears well-developed and well-nourished.   HENT:   Head: Normocephalic and atraumatic.   Neck: Normal range of motion. Left anterior neck near the sternocleidomastoid there is a 4 cm x 4 cm soft mass that is not mobile, this is not firm, there is no erythema surrounding this, it is not warm.  There is no pulsations.  No tenderness palpation at this area.  Pulmonary/Chest: Effort normal. No respiratory distress.   Cardiac: No murmurs, rubs, gallops. RRR.  Abdominal: Abdomen soft, nontender, nondistended. No rebound tenderness.  No CVA tenderness bilaterally.  MSK: Long bones without deformity or evidence of trauma  Neurological: alert and oriented to person, place, and time.   Skin: Skin is warm and dry.   Psychiatric:  normal mood and affect.  behavior is normal. Thought content normal.     ED Course        Procedures  Results for orders placed or performed during the hospital encounter of 01/14/20   CBC with platelets differential     Status: Abnormal   Result Value Ref [facility]    WBC 6.7 4.0 - 11.0 10e9/L    RBC Count 3.96 3.8 - 5.2 10e12/L    Hemoglobin 11.1 (L) 11.7 - 15.7 g/dL    Hematocrit 35.2 35.0 - 47.0 %    MCV 89 78 - 100 fl    MCH 28.0 26.5 - 33.0 pg    MCHC 31.5 31.5 - 36.5 g/dL    RDW 13.5 10.0 - 15.0 %    Platelet Count 249 150 - 450 10e9/L    Diff Method Automated Method     % Neutrophils 61.8 %    % Lymphocytes 28.8 %    % Monocytes 8.1 %    % Eosinophils 0.7 %    % Basophils 0.3 %    % Immature Granulocytes 0.3 %    Nucleated RBCs 0 0 /100    Absolute Neutrophil 4.1 1.6 - 8.3 10e9/L    Absolute Lymphocytes 1.9 0.8 - 5.3 10e9/L    Absolute Monocytes 0.5 0.0 - 1.3 10e9/L    Absolute Eosinophils 0.1 0.0 - 0.7 10e9/L    Absolute Basophils 0.0 0.0 - 0.2 10e9/L    Abs Immature Granulocytes 0.0 0 - 0.4 10e9/L    Absolute Nucleated RBC 0.0    Comprehensive metabolic panel     Status: Abnormal   Result  Value Ref [facility]    Sodium 143 133 - 144 mmol/L    Potassium 3.8 3.4 - 5.3 mmol/L    Chloride 109 94 - 109 mmol/L    Carbon Dioxide 30 20 - 32 mmol/L    Anion Gap 4 3 - 14 mmol/L    Glucose 147 (H) 70 - 99 mg/dL    Urea Nitrogen 35 (H) 7 - 30 mg/dL    Creatinine 1.30 (H) 0.52 - 1.04 mg/dL    GFR Estimate 43 (L) >60 mL/min/[1.73_m2]    GFR Estimate If Black 50 (L) >60 mL/min/[1.73_m2]    Calcium 9.1 8.5 - 10.1 mg/dL    Bilirubin Total 0.3 0.2 - 1.3 mg/dL    Albumin 3.4 3.4 - 5.0 g/dL    Protein Total 7.0 6.8 - 8.8 g/dL    Alkaline Phosphatase 71 40 - 150 U/L    ALT 18 0 - 50 U/L    AST 12 0 - 45 U/L   Lipase     Status: None   Result Value Ref [facility]    Lipase 233 73 - 393 U/L   UA reflex to Microscopic and Culture     Status: Abnormal   Result Value Ref [facility]    Color Urine Yellow     Appearance Urine Slightly Cloudy     Glucose Urine Negative NEG^Negative mg/dL    Bilirubin Urine Negative NEG^Negative    Ketones Urine Negative NEG^Negative mg/dL    Specific Gravity Urine 1.020 1.003 - 1.035    Blood Urine Negative NEG^Negative    pH Urine 8.0 (H) 5.0 - 7.0 pH    Protein Albumin Urine Negative NEG^Negative mg/dL    Urobilinogen mg/dL Normal 0.0 - 2.0 mg/dL    Nitrite Urine Negative NEG^Negative    Leukocyte Esterase Urine Negative NEG^Negative    Source Clean catch urine     RBC Urine 0 0 - 2 /HPF    WBC Urine 0 0 - 5 /HPF    Bacteria Urine Moderate (A) NEG^Negative /HPF    Squamous Epithelial /HPF Urine 1 0 - 1 /HPF    Transitional Epi <1 0 - 1 /HPF       Labs Ordered and Resulted from Time of ED Arrival Up to the Time of Departure from the ED - No data to display         Assessments & Plan (with Medical Decision Making)   MDM  Patient presented with abdominal pain.  Here she is asymptomatic, she has been asymptomatic for several hours.  I do not feel that she has an acute abdomen so will defer imaging at this point.  Discussed discharge and follow-up with the primary doctor however the patient elected to do labs.   Vitals here are stable.  She does not need anything for pain at this point.    Regarding the mass on her neck, this is seem to be improving per the patient.  Bedside ultrasound does not show obvious abscess, there is no evidence of infection on exam and there is no cobblestoning on ultrasound, very unlikely infectious etiology as it is improving.   this may be a seroma, lipoma or hematoma.  She will follow-up with her primary care prior provider regarding this.    I have reviewed the nursing notes.    I have reviewed the findings, diagnosis, plan and need for follow up with the patient.    New Prescriptions    No medications on file       Final diagnoses:   Abdominal pain, generalized   Neck mass       1/13/2020   [facility] [place], EMERGENCY DEPARTMENT     Stephen Sterling MD  01/14/20 0615

## 2020-01-26 DIAGNOSIS — J32.0 CHRONIC MAXILLARY SINUSITIS: ICD-10-CM

## 2020-01-26 RX ORDER — FLUTICASONE PROPIONATE 50 MCG
SPRAY, SUSPENSION (ML) NASAL
Qty: 48 G | Refills: 3 | Status: SHIPPED | OUTPATIENT
Start: 2020-01-26 | End: 2021-03-29

## 2020-02-07 ENCOUNTER — OFFICE VISIT (OUTPATIENT)
Dept: URGENT CARE | Facility: URGENT CARE | Age: 65
End: 2020-02-07
Payer: COMMERCIAL

## 2020-02-07 VITALS
SYSTOLIC BLOOD PRESSURE: 112 MMHG | HEART RATE: 60 BPM | WEIGHT: 203 LBS | TEMPERATURE: 98.2 F | HEIGHT: 63 IN | DIASTOLIC BLOOD PRESSURE: 68 MMHG | RESPIRATION RATE: 14 BRPM | BODY MASS INDEX: 35.97 KG/M2

## 2020-02-07 DIAGNOSIS — R31.9 URINARY TRACT INFECTION WITH HEMATURIA, SITE UNSPECIFIED: Primary | ICD-10-CM

## 2020-02-07 DIAGNOSIS — R30.0 DYSURIA: ICD-10-CM

## 2020-02-07 DIAGNOSIS — N39.0 URINARY TRACT INFECTION WITH HEMATURIA, SITE UNSPECIFIED: Primary | ICD-10-CM

## 2020-02-07 DIAGNOSIS — R82.90 NONSPECIFIC FINDING ON EXAMINATION OF URINE: ICD-10-CM

## 2020-02-07 LAB
ALBUMIN UR-MCNC: 100 MG/DL
APPEARANCE UR: CLEAR
BILIRUB UR QL STRIP: NEGATIVE
COLOR UR AUTO: YELLOW
GLUCOSE UR STRIP-MCNC: NEGATIVE MG/DL
HGB UR QL STRIP: ABNORMAL
KETONES UR STRIP-MCNC: NEGATIVE MG/DL
LEUKOCYTE ESTERASE UR QL STRIP: ABNORMAL
NITRATE UR QL: NEGATIVE
PH UR STRIP: 6 PH (ref 5–7)
RBC #/AREA URNS AUTO: >100 /HPF
SOURCE: ABNORMAL
SP GR UR STRIP: >1.03 (ref 1–1.03)
UROBILINOGEN UR STRIP-ACNC: 0.2 EU/DL (ref 0.2–1)
WBC #/AREA URNS AUTO: ABNORMAL /HPF

## 2020-02-07 PROCEDURE — 87086 URINE CULTURE/COLONY COUNT: CPT | Performed by: FAMILY MEDICINE

## 2020-02-07 PROCEDURE — 99213 OFFICE O/P EST LOW 20 MIN: CPT | Performed by: FAMILY MEDICINE

## 2020-02-07 PROCEDURE — 81001 URINALYSIS AUTO W/SCOPE: CPT | Performed by: HOSPITALIST

## 2020-02-07 RX ORDER — CEPHALEXIN 500 MG/1
500 CAPSULE ORAL 2 TIMES DAILY
Qty: 14 CAPSULE | Refills: 0 | Status: SHIPPED | OUTPATIENT
Start: 2020-02-07 | End: 2020-02-14

## 2020-02-07 ASSESSMENT — MIFFLIN-ST. JEOR: SCORE: 1434.93

## 2020-02-07 NOTE — PROGRESS NOTES
"SUBJECTIVE:  Hafsa Corona is a 64 year old female who  presents today for a possible UTI.   Symptoms:dysuria, urgency and frequency  Onset of symptoms: this morning   Discharge present?: no vaginal symptoms noticed   Hematuria?: yes.  LMP: Patient's last menstrual period was 01/01/2009.  This patient does have a history of urinary tract infections.   Patient denies: fevers, flank pain or vomiting       OBJECTIVE:  /68   Pulse 60   Temp 98.2  F (36.8  C) (Oral)   Resp 14   Ht 1.6 m (5' 3\")   Wt 92.1 kg (203 lb)   LMP 01/01/2009   BMI 35.96 kg/m    GENERAL APPEARANCE: healthy, alert and no distress  RESP: lungs clear to auscultation - no rales, rhonchi or wheezes  CV: regular rates and rhythm, normal S1 S2, no murmur noted  BACK: No CVA tenderness      Labs:  Results for orders placed or performed in visit on 02/07/20 (from the past 24 hour(s))   *UA reflex to Microscopic and Culture ([facility] (except [place] and [place])   Result Value Ref [facility]    Color Urine Yellow     Appearance Urine Clear     Glucose Urine Negative NEG^Negative mg/dL    Bilirubin Urine Negative NEG^Negative    Ketones Urine Negative NEG^Negative mg/dL    Specific Gravity Urine >1.030 1.003 - 1.035    Blood Urine Large (A) NEG^Negative    pH Urine 6.0 5.0 - 7.0 pH    Protein Albumin Urine 100 (A) NEG^Negative mg/dL    Urobilinogen Urine 0.2 0.2 - 1.0 EU/dL    Nitrite Urine Negative NEG^Negative    Leukocyte Esterase Urine Small (A) NEG^Negative    Source Midstream Urine    Urine Microscopic   Result Value Ref [facility]    WBC Urine 25-50 (A) OTO5^0 - 5 /HPF    RBC Urine >100 (A) OTO2^O - 2 /HPF       ASSESSMENT/PLAN:     ICD-10-CM    1. Urinary tract infection with hematuria, site unspecified N39.0 cephALEXin (KEFLEX) 500 MG capsule    R31.9    2. Dysuria R30.0 *UA reflex to Microscopic and Culture ([facility] (except [place] and [place])     Urine Microscopic       We discussed the expected " course of the illness, medication, and symptomatic cares in detail.   Has OTC azo at home already.  Advised to return to care if symptoms not improving as expected within 3 days of starting the antibiotics, do not resolve completely, or if any new or worsening symptoms develop.

## 2020-02-08 LAB
BACTERIA SPEC CULT: NO GROWTH
SPECIMEN SOURCE: NORMAL

## 2020-03-15 ENCOUNTER — HEALTH MAINTENANCE LETTER (OUTPATIENT)
Age: 65
End: 2020-03-15

## 2020-03-27 DIAGNOSIS — Z90.5 SOLITARY KIDNEY, ACQUIRED: ICD-10-CM

## 2020-03-27 DIAGNOSIS — E78.5 HYPERLIPIDEMIA LDL GOAL <100: ICD-10-CM

## 2020-03-27 DIAGNOSIS — C56.9 MALIGNANT NEOPLASM OF OVARY, UNSPECIFIED LATERALITY (H): Primary | ICD-10-CM

## 2020-03-27 LAB
ANION GAP SERPL CALCULATED.3IONS-SCNC: 4 MMOL/L (ref 3–14)
BUN SERPL-MCNC: 19 MG/DL (ref 7–30)
CALCIUM SERPL-MCNC: 8.9 MG/DL (ref 8.5–10.1)
CANCER AG125 SERPL-ACNC: 14 U/ML (ref 0–30)
CHLORIDE SERPL-SCNC: 105 MMOL/L (ref 94–109)
CHOLEST SERPL-MCNC: 166 MG/DL
CO2 SERPL-SCNC: 28 MMOL/L (ref 20–32)
CREAT SERPL-MCNC: 1.14 MG/DL (ref 0.52–1.04)
GFR SERPL CREATININE-BSD FRML MDRD: 50 ML/MIN/{1.73_M2}
GLUCOSE SERPL-MCNC: 80 MG/DL (ref 70–99)
HDLC SERPL-MCNC: 70 MG/DL
LDLC SERPL CALC-MCNC: 69 MG/DL
NONHDLC SERPL-MCNC: 95 MG/DL
POTASSIUM SERPL-SCNC: 3.9 MMOL/L (ref 3.4–5.3)
SODIUM SERPL-SCNC: 137 MMOL/L (ref 133–144)
TRIGL SERPL-MCNC: 133 MG/DL

## 2020-03-27 PROCEDURE — 80048 BASIC METABOLIC PNL TOTAL CA: CPT | Performed by: OBSTETRICS & GYNECOLOGY

## 2020-03-27 PROCEDURE — 86304 IMMUNOASSAY TUMOR CA 125: CPT | Performed by: OBSTETRICS & GYNECOLOGY

## 2020-03-27 PROCEDURE — 25000128 H RX IP 250 OP 636: Performed by: OBSTETRICS & GYNECOLOGY

## 2020-03-27 PROCEDURE — 80061 LIPID PANEL: CPT | Performed by: OBSTETRICS & GYNECOLOGY

## 2020-03-27 PROCEDURE — 36591 DRAW BLOOD OFF VENOUS DEVICE: CPT

## 2020-03-27 RX ORDER — HEPARIN SODIUM (PORCINE) LOCK FLUSH IV SOLN 100 UNIT/ML 100 UNIT/ML
5 SOLUTION INTRAVENOUS
Status: COMPLETED | OUTPATIENT
Start: 2020-03-27 | End: 2020-03-27

## 2020-03-27 RX ADMIN — Medication 5 ML: at 15:17

## 2020-03-27 NOTE — NURSING NOTE
"Chief Complaint   Patient presents with     Lab Only     labs drawn from port by rn.  vs taken     Port accessed with 20 gauge 3/4\" gripper needle and labs drawn by rn.  Port flushed with NS and heparin then de-accessed.  Pt tolerated well.     Diana Escobar, RN      "

## 2020-04-02 DIAGNOSIS — C56.9 MALIGNANT NEOPLASM OF OVARY, UNSPECIFIED LATERALITY (H): Primary | ICD-10-CM

## 2020-04-08 ENCOUNTER — VIRTUAL VISIT (OUTPATIENT)
Dept: ONCOLOGY | Facility: CLINIC | Age: 65
End: 2020-04-08
Attending: OBSTETRICS & GYNECOLOGY
Payer: COMMERCIAL

## 2020-04-08 DIAGNOSIS — C56.9 MALIGNANT NEOPLASM OF OVARY, UNSPECIFIED LATERALITY (H): Primary | ICD-10-CM

## 2020-04-08 PROCEDURE — 40000114 ZZH STATISTIC NO CHARGE CLINIC VISIT

## 2020-04-08 PROCEDURE — 99215 OFFICE O/P EST HI 40 MIN: CPT | Mod: TEL | Performed by: OBSTETRICS & GYNECOLOGY

## 2020-04-08 NOTE — PROGRESS NOTES
"Hafsa Corona is a 65 year old female who is being evaluated via a billable telephone visit.      Please call patient on Mobile phone.     The patient has been notified of following:     \"This telephone visit will be conducted via a call between you and your physician/provider. We have found that certain health care needs can be provided without the need for a physical exam.  This service lets us provide the care you need with a short phone conversation.  If a prescription is necessary we can send it directly to your pharmacy.  If lab work is needed we can place an order for that and you can then stop by our lab to have the test done at a later time.    Telephone visits are billed at different rates depending on your insurance coverage. During this emergency period, for some insurers they may be billed the same as an in-person visit.  Please reach out to your insurance provider with any questions.    If during the course of the call the physician/provider feels a telephone visit is not appropriate, you will not be charged for this service.\"     Physician has received verbal consent for a Telephone Visit from the patient? Yes    Hafsa Corona complains of    Chief Complaint   Patient presents with     Telephone     Telephone Visit; Ovarian Cancer        Allergies reviewed: Yes  Medications reviewed: Yes    Medications: Medication refills not needed today.  Pharmacy name entered into [employer]: [id number] - [place], [place] - [address] AT [facility] [address]      Clinical concerns: No new concerns today.      Danielle Ladd CMA April 8, 2020  2:54 PM     ALLERGIES  Paclitaxel; Wasps [hornets]; Yellow hornet venom [hornet venom]; Yellow jacket venom [venomil honey bee]; and Sulfa drugs    Additional provider notes:                 Follow Up Notes on Referred Patient    Date: 4/28/2020       Dr. Morelia Ayon, NP  [address] [place] [address]       RE: Hafsa DESHPANDE" Cj  : 1955  CLIFF: 2020    Dear Dr. 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                       The patient presents today for followup.  She has been doing well. She feels better overall. No nausea or vomiting, fever or chills.  Normal urinary and bowel function.  No vaginal bleeding.  No B symptoms.     Oncology History:  The patient has had a recent episode of lower abdominal pain in early July.  She was subsequently sent to the emergency room and was found to have a large 9 cm complex ovarian mass. She was admitted to the hospital for pain control.  7/3/17:  1187  17: Exploratory laparotomy, total abdominal hysterectomy, left salpingo-oophorectomy, cancer staging including infracolic omentectomy, bilateral pelvic & para-aortic lymphadenectomy, peritoneal biopsies, evacuation of pelvic fluid by Dr. Cole.    FINAL DIAGNOSIS:   A. LEFT OVARY AND FALLOPIAN TUBE, LEFT SALPINGO-OOPHORECTOMY:   - Ovarian clear cell carcinoma   - Background of endometriosis   - Fallopian tube with no significant histologic abnormality.   B. UTERUS, HYSTERECTOMY:   -  Inactive endometrium   -  Myometrium with adenomyosis and leiomyoma.   -  Cervix with squamous metaplasia.   C. PERITONEUM, RIGHT PELVIS, BIOPSY:   - Fibroadipose tissue, negative for malignancy.   D. LYMPH NODES, RIGHT PELVIC, DISSECTION:   - Thirteen benign lymph nodes (0/13).   E. LYMPH NODES, LEFT PELVIC, DISSECTION:   - Seven benign lymph nodes (0/7).   F. PERITONEUM, LEFT PELVIS, BIOPSY:   - Fibroadipose tissue, negative for malignancy.   G. PERITONEUM, BLADDER, BIOPSY:   - Fibroadipose tissue  with acute and chronic inflammation, negative for malignancy.   H. PERITONEUM, POSTERIOR CUL-DE-SAC, BIOPSY:   - Fibroadipose tissue, negative for malignancy.   I. PERITONEUM, LEFT PARACOLIC GUTTER, BIOPSY:   - Fibroadipose tissue, negative for malignancy.   J. LYMPH  NODES, LEFT PARA-AORTIC, DISSECTION:   - Five benign lymph nodes (0/5).   K. LYMPH NODES, RIGHT PARA-AORTIC, DISSECTION:   - Two benign lymph nodes (0/2).   L. PERITONEUM, RIGHT PARACOLIC GUTTER, BIOPSY:   - Fibroadipose tissue, negative for malignancy.   M. OMENTUM, OMENTECTOMY:   - Adipose tissue with reactive changes, negative for malignancy.   COMMENT:   The final diagnosis confirms the interpretation provided intraoperatively.   Report Name: Ovary or Fallopian Tube   Status: Submitted   Part(s) Involved:   A: Ovary and fallopian tube, left   Synoptic Report:   CLINICAL   Clinical History:   - Pelvic mass   SPECIMEN   Procedure:   - Left salpingo-oophorectomy   - Hysterectomy   - Omentectomy   - Peritoneal  biopsies   Lymph Node Sampling:   - Performed   Location:   - Pelvic lymph nodes   - Para-aortic lymph nodes   Specimen Integrity:   - Left ovary   Specimen Integrity of Left Ovary:   - Capsule intact   TUMOR   Primary Tumor Site(s):   - Left ovary   Left Ovary   Tumor Size of Left Ovary: 19 cm   Histologic Type:   - Clear cell carcinoma   Tumor Extent   Ovarian Surface Involvement:   - Absent   Specimen(s)   Extent of Left Ovary:   - Involved   Extent of Left Fallopian Tube:   - Not involved   Extent of Omentum:   - Not involved   Extent of Uterus:   - Not involved   Extent of Peritoneum:   - Not involved   Peritoneal Ascitic Fluid:   - Not performed / unknown   Pleural Fluid:   - Not performed / unknown   LYMPH NODES     Number of Pelvic Lymph Nodes Examined: 20     Number of Pelvic Lymph Nodes Involved: None identified     Number of Para-aortic Lymph Nodes Examined: 7     Number of Para-Aortic Lymph Nodes Involved: None identified   STAGE (PTNM, AJCC 7TH ED.)   Primary Tumor (pT):   - Ovary   Ovarian Primary Tumor (pT):   - pT1a: Tumor limited to 1 ovary; capsule intact, no tumor on ovarian surface. No malignant cells in ascites or peritoneal washings#   Regional Lymph Nodes (pN):   - pN0: No regional  lymph node metastasis       07/31/17: Dr Shaffer follow up: Discussed with the patient that given the high risk histology, as well as ruptured mass before surgery, she would be qualified at higher risk of recurrence despite early stage ovarian cancer.  Recommended adjuvant chemotherapy. Plan for carboplatin of AUC of 6 and paclitaxel of 175, m2 for 3 cycles. Referral for genetic counselor and will see her back after those 3 cycles  08/03/17: Call from patient stating she is going for a second opinion and would like chemotherapy rescheduled to week of 8/14/17.      08/16/17: Cycle #1 Carbo/Taxol.  73. Significant paclitaxel reaction.  08/30/17: C1 carboplatin/inpatient paclitaxel desensitization.   09/25/17: C2 carboplatin/paclitaxel- inpatient paclitaxel desensitization.  15.  10/16/17: C3 carboplatin/paclitaxel- switched to docetaxel given her neuropathy.  10.  11/14/17:  8. CT CAP:  IMPRESSION:   1. Post surgical changes of hysterectomy and bilateral  salpingo-oophorectomy. Nodular soft tissue density in the posterior  cul-de-sac along with ill-defined nodular thickening in the left  pelvis, suspicious for residual disease or metastatic deposits.   2. There is a 3 cm mass in the superior pole of the right kidney,  possibly extending into the renal hilum. Represents RCC until proven  otherwise. Consider renal mass protocol CT to assess renal vein  involvement.  3. Stable sub-4 mm pulmonary nodules, continued attention on  follow-up.     11/16/17: CT renal mass protocol  IMPRESSION:  1. Arterially enhancing mass measuring 3.6 x 2.1 x 2.2 cm mass arising  from superior posterior of the right kidney, this is renal cell  carcinoma until proven otherwise.  2. Stable fat-containing umbilical hernia.     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 Margins negative. Three month surveillance plan with [facility].     2/26/18:  14     5/22/18:   11      8/23/18:  11     12/12/18:  10     3/8/19:  12     6/19/19:  13     9/23/19:  12    3/27/20:  14    Past Medical History:    Past Medical History:   Diagnosis Date     Anxiety state, unspecified     [id number]     Arthritis 2014    vague, gradual, not treated really, knees feel it     At high risk for breast cancer 09/27/2019    30.3% lifetime risk by SHERON model     Attention deficit disorder without mention of hyperactivity      Cancer (H) 7/2017 and 1/2018    ovarian and kidney cancers, both treated well     Chronic rhinitis      Depressive disorder lifetime    plus Anxiety plus ADD. Escitalipram, therapy, ADD meds maybe     Depressive disorder, not elsewhere classified      Heart disease 1999    tachycardia and high cholesterol, managed     History of blood transfusion 7/2017    while in hospital for ovarian cancer     Hypertension 1999    tho BP was too LOW last week. past 12 years Generally OK     Panic disorder without agoraphobia      Pure hypercholesterolemia     Lipitor     Tachycardia      Tachycardia, unspecified     9/1999-2/2004     Type II or unspecified type diabetes mellitus without mention of complication, not stated as uncontrolled     diagnosed 2004         Past Surgical History:    Past Surgical History:   Procedure Laterality Date     AS REMV KIDNEY,RADICAL Right      BIOPSY  7/2017 and 2/2018    ovarian and kidney cancer biopsies. also 1987 breast benign     BREAST SURGERY  1987    date unsure. benign breast cyst removed     CATARACT IOL, RT/LT Left 05/18/2018     CATARACT IOL, RT/LT Right 07/1318     COLONOSCOPY  2008 and 2013    polyps removed. Next due fall 2018     HYSTERECTOMY TOTAL ABDOMINAL, BILATERAL SALPINGO-OOPHORECTOMY, NODE DISSECTION, COMBINED Left 7/7/2017    Procedure: COMBINED HYSTERECTOMY TOTAL ABDOMINAL, SALPINGO-OOPHORECTOMY, NODE DISSECTION;  Exploratory Laparotomy, Left Salpingo-Oophorectomy, Cancer Staging, Total Hysterectomy,  Omentectomy, Evacuation of abdominal fluid, Lymph Node Dissection  Anesthesia Block ;  Surgeon: Serena Cole MD;  Location: [facility] OR     HYSTERECTOMY, PAP NO LONGER INDICATED  07/07/2017    Laparotomy; for ovarian cancer staging     HYSTERECTOMY, PAP NO LONGER INDICATED       INSERT PORT VASCULAR ACCESS Right 8/21/2017    Procedure: INSERT PORT VASCULAR ACCESS;  Single Lumen Chest Power Port;  Surgeon: Stephen Mike PA-C;  Location: [facility] OR     LYMPHADENECTOMY RETROPERITONEAL Bilateral 07/07/2017    Laparotomy; pelvics & para-aortics; for ovarian cancer staging     OMENTECTOMY  07/07/2017    Laparotomy; for ovarian cancer staging     ORTHOPEDIC SURGERY  1/2002    broken left jaw due to fall, 4 fractures, titanium plates     PHACOEMULSIFICATION CLEAR CORNEA WITH STANDARD INTRAOCULAR LENS IMPLANT Right 7/13/2018    Procedure: PHACOEMULSIFICATION CLEAR CORNEA WITH STANDARD INTRAOCULAR LENS IMPLANT;  RIght Eye Phacoemulsification Clear Cornea with Standard Intraocular Lens Placement ;  Surgeon: Mary Jo Massey MD;  Location: [facility] OR     PHACOEMULSIFICATION CLEAR CORNEA WITH TORIC INTRAOCULAR LENS IMPLANT Left 5/18/2018    Procedure: PHACOEMULSIFICATION CLEAR CORNEA WITH TORIC INTRAOCULAR LENS IMPLANT;  Left Eye Phacoemulsification with Toric Lens;  Surgeon: Mary Jo Massey MD;  Location: [facility] OR     SALPINGO OOPHORECTOMY,R/L/KAYY Right 2008    Salpingo Oophorectomy, RT     SALPINGO OOPHORECTOMY,R/L/KAYY Left 07/07/2017    Laparotomy; for ovarian cancer staging     SURGICAL HISTORY OF -       facial surgery d/t fall in 1/2002     SURGICAL HISTORY OF -       1985 removal of breast cyst     SURGICAL HISTORY OF -   2008    endometrial ablation     YAG CAPSULOTOMY OD (RIGHT EYE)  10/22/2018         Health Maintenance Due   Topic Date Due     DEPRESSION ACTION PLAN  1955     ZOSTER IMMUNIZATION (1 of 2) 03/09/2004     PHQ-9  03/06/2017     MEDICARE ANNUAL WELLNESS VISIT  03/09/2019     PNEUMOCOCCAL  IMMUNIZATION 65+ HIGH/HIGHEST RISK (1 of 2 - PCV13) 03/09/2019     EYE EXAM  11/28/2019     A1C  04/01/2020       Current Medications:     Current Outpatient Medications   Medication Sig Dispense Refill     Acetaminophen (TYLENOL PO) Take 500 mg by mouth 2 tabs prn pain (monthly)       atorvastatin (LIPITOR) 80 MG tablet Take 1 tablet (80 mg) by mouth daily 90 tablet PRN     B Complex Vitamins (VITAMIN B-COMPLEX PO) Take 50 mg by mouth        CALCIUM-MAGNESIUM-VITAMIN D PO Take 2 tablets by mouth daily       Cholecalciferol (VITAMIN D) 1000 UNIT capsule Take 2,000 Units by mouth daily        escitalopram (LEXAPRO) 20 MG tablet Take 1 tablet (20 mg) by mouth daily 90 tablet 3     fluticasone (FLONASE) 50 MCG/ACT nasal spray SHAKE LIQUID AND USE 2 SPRAYS IN EACH NOSTRIL DAILY 48 g 3     LORazepam (ATIVAN) 1 MG tablet Take 1 tablet (1 mg) by mouth every 6 hours as needed (nausea/vomiting, anxiety or sleep) 30 tablet 1     metFORMIN (GLUCOPHAGE-XR) 500 MG 24 hr tablet TAKE 2 TABLETS BY MOUTH EVERY DAY WITH THE EVENING MEAL 180 tablet PRN     Multiple Vitamins-Minerals (MULTIVITAMIN ADULT PO) Take 1 tablet by mouth daily       Probiotic Product (PRO-BIOTIC BLEND PO) Take 1 capsule by mouth daily Enzymatic Therapy-probiotic pearls       rivaroxaban ANTICOAGULANT (XARELTO) 20 MG TABS tablet Take 1 tablet (20 mg) by mouth daily (with dinner) 30 tablet 11     tamoxifen (NOLVADEX) 10 MG tablet Cut each pill in 1/2 with pill cutter to ensure 5 mg daily dose. 45 tablet 3     traZODone (DESYREL) 50 MG tablet TAKE 1 TO 2 TABLETS(50  MG) BY MOUTH EVERY NIGHT AS NEEDED FOR SLEEP 180 tablet PRN     DiphenhydrAMINE HCl (BENADRYL PO) Take 50 mg by mouth daily as needed (monthly)       EPINEPHrine (EPIPEN/ADRENACLICK/OR ANY BX GENERIC EQUIV) 0.3 MG/0.3ML injection 2-pack Inject 0.3 mLs (0.3 mg) into the muscle once as needed for anaphylaxis (Patient not taking: Reported on 4/8/2020) 0.3 mL PRN     lisinopril (PRINIVIL/ZESTRIL) 5  MG tablet Take 1 tablet (5 mg) by mouth daily (Patient not taking: Reported on 2020) 90 tablet PRN     Magnesium Hydroxide (MAGNESIA PO) Take 500 mg by mouth       nitroFURantoin macrocrystal-monohydrate (MACROBID) 100 MG capsule Take one capsule after intercourse as needed (Patient not taking: Reported on 2020) 30 capsule PRN         Allergies:        Allergies   Allergen Reactions     Paclitaxel Anaphylaxis and Difficulty breathing     Wasps [Hornets] Anaphylaxis     Yellow Hornet Venom [Hornet Venom] Anaphylaxis and Swelling     Yellow Jacket Venom [Venomil Honey Bee] Anaphylaxis and Swelling     Sulfa Drugs Hives and Rash        Social History:     Social History     Tobacco Use     Smoking status: Never Smoker     Smokeless tobacco: Never Used   Substance Use Topics     Alcohol use: Yes     Comment: Very infrequent, a bit of wine or beer 2x per month maybe       History   Drug Use     Types: Marijuana     Comment: infrequent, for celebrations only, maybe 8x per year         Family History:         Family History   Problem Relation Age of Onset     C.A.D. Father      Diabetes Father         Later in his life, in his 70s     Hypertension Father      Cerebrovascular Disease Father         1984 or      Psychotic Disorder Father      Pancreatic Cancer Father      Coronary Artery Disease Father         diagnosed in his late 50s (age)     Hyperlipidemia Father         treated w statins     Other Cancer Father         pancreatic, ,  of this     Depression Father      Mental Illness Father         likely PTSD and depression     Obesity Father      C.A.D. Mother      Hypertension Mother      Breast Cancer Mother         2 times,  and ?     Psychotic Disorder Mother      Colon Cancer Mother         ?     Cancer Mother         Bone cancer     Coronary Artery Disease Mother         diagnosed in her late 70s (age)     Hyperlipidemia Mother         treated w. statins     Other Cancer Mother          bone/marrow, ,  of this     Depression Mother      Anxiety Disorder Mother      Mental Illness Mother         various undiagnosed types, but THERE     Obesity Mother      Prostate Problems Brother         cleared      Hypertension Brother      Hyperlipidemia Brother         unsure if treated w statins     Depression Brother      Anxiety Disorder Brother      Mental Illness Brother         undiagnosed but THERE     Obesity Brother      Psychotic Disorder Sister         x2     Neurologic Disorder Sister      Hypertension Sister      Hyperlipidemia Sister         treated w statins     Depression Sister      Anxiety Disorder Sister      Obesity Sister      Psychotic Disorder Brother         x2     Depression Brother         major depressive disorder and OCD     Anxiety Disorder Brother      Mental Illness Brother         not sure of diagnoses, treated     Hypertension Brother      Hyperlipidemia Brother      Psychotic Disorder Maternal Grandmother         ?     Coronary Artery Disease Maternal Grandmother          of heart attack age 84?     Depression Maternal Grandmother      Psychotic Disorder Maternal Grandfather         ?     Psychotic Disorder Paternal Grandmother         Schizophernia     Coronary Artery Disease Paternal Grandmother          of heart attack, age 85?     Depression Paternal Grandmother         severe, but recovered     Mental Illness Paternal Grandmother         possible bipolar or other     Psychotic Disorder Paternal Grandfather         ?     Respiratory Paternal Grandfather          of emphysema and smoking     Psychotic Disorder Sister      Other - See Comments Sister         small kidney stone      Hypertension Sister      Hyperlipidemia Sister         treated w statins     Depression Sister      Anxiety Disorder Sister      Cancer - colorectal No family hx of      Glaucoma No family hx of      Macular Degeneration No family hx of          Physical Exam:     LMP  01/01/2009   There is no height or weight on file to calculate BMI.    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      A total of 40 minutes was spent with the patient, 30 minutes of which were spent in counseling the patient and/or treatment planning.        1.  Stage IC2 clear cell carcinoma of the ovary.   2.  Benign renal mass.   3.   of 14.      Discussed with the patient that she has a normal .  No evidence of ovarian cancer recurrence. We will see her back for ovarian cancer surveillance every 3-4 months for the first year, then every 6 months and then yearly from then on when she gets to year 4.  If this happened to be ovarian cancer recurrence, which is less likely especially as this is retroperitoneal, we will see her back for treatment.  Otherwise, we will see her for surveillance.  The patient agrees with the plan.  She is very appreciative of her care.  All questions were answered.         Jarod Shaffer MD, MS    Department of Obstetrics and Gynecology   Division of Gynecologic Oncology   [facility]  Phone: [phone]         CC  Patient Care Team:  Whit Ayon NP as PCP - General  Karla Oswald, RN as Continuity Care Coordinator (Gyn-Onc)  Nabeel Dick MD as MD (Urology)  Javier Gregorio MD as MD (Urology)  Lola Vasquez, PhD LP as Psychologist (Psychology)  Whit Ayon NP as Assigned PCP  WHIT AYON    Phone call duration: 40 minutes

## 2020-09-05 ENCOUNTER — VIRTUAL VISIT (OUTPATIENT)
Dept: URGENT CARE | Facility: CLINIC | Age: 65
End: 2020-09-05
Payer: COMMERCIAL

## 2020-09-05 ENCOUNTER — NURSE TRIAGE (OUTPATIENT)
Dept: NURSING | Facility: CLINIC | Age: 65
End: 2020-09-05

## 2020-09-05 DIAGNOSIS — R09.81 NASAL CONGESTION: Primary | ICD-10-CM

## 2020-09-05 PROCEDURE — 99442 ZZC PHYSICIAN TELEPHONE EVALUATION 11-20 MIN: CPT | Mod: 95 | Performed by: FAMILY MEDICINE

## 2020-09-05 NOTE — PATIENT INSTRUCTIONS
Instructions for Patients  Please follow these steps:    1. We will call to schedule your test.  2. A member of our care team will ask you some questions. Then, they will use a swab to collect samples from your nose and throat.     Our testing team will send you your test results.    How can I protect others?    Stay home and away from others (self-isolate) until:    You ve had no fever--and no medicine that reduces fever--for 1 full day (24 hours). And      Your other symptoms have resolved (gotten better). For example, your cough or breathing has improved. And     At least 10 days have passed since your symptoms started.    Stay at least 6 feet away from others. (If someone will drive you to your test, stay in the backseat, as far away from the  as you can.)     Don t go to work, school or anywhere else. When it s time for your test, go straight to the testing site. Don t make any stops on the way there or back.     Wash your hands and face often. Use soap and water.     Cover your mouth and nose with a mask, tissue or washcloth.     Don t touch anyone. No hugging, kissing or handshakes.    How can I take care of myself?    1. Get lots of rest. Drink extra fluids (unless a doctor has told you not to).     2. Take Tylenol (acetaminophen) for fever or pain. If you have liver or kidney problems, ask your family doctor if it's okay to take Tylenol.     Adults can take either:     650 mg (two 325 mg pills) every 4 to 6 hours, or     1,000 mg (two 500 mg pills) every 8 hours as needed.     Note: Don't take more than 3,000 mg in one day.   Acetaminophen is found in many medicines (both prescribed and over-the-counter medicines). Read all labels to be sure you don't take too much.   For children, check the Tylenol bottle for the right dose. The dose is based on  the child's age or weight.    3. If you have other health problems (like cancer, heart failure, an organ transplant or severe kidney disease): Call your  specialty clinic if you don't feel better in the next 2 days.    4. Know when to call 911: If your breathing is so bad that it keeps you from doing normal activities, call 911 or go to the emergency room. Tell them that you've been staying home and may have COVID-19.    Patient Education   Sign Up for [employer]  COVID-19 Symptoms  We know it's scary to hear you might have COVID-19. We want to track your symptoms to make sure you're OK over the next 2 weeks.    Please look for an email from [employer]. This is a free, online program that we'll use to keep in touch. To sign up, click the link in the email you get.    If you don't get an email, please call your [facility] clinic. Ask them to sign you up for [employer].    You can learn more at [email or web address].  For informational purposes only. Not to replace the advice of your health care provider.   Copyright   2020 [facility]. Clinically reviewed by Morelia Larsen. All rights reserved. [employer] [id number] - 04/20.             Thank you for taking steps to prevent the spread of this virus.  o Limit your contact with others.  o Wear a simple mask to cover your cough.  o Wash your hands well and often.  o If you need medical care, go to [email or web address] or contact your health care provider.     For more about COVID-19 and caring for yourself at home, visit the CDC website at https://www.cdc.gov/coronavirus/2019-ncov/about/steps-when-sick.html.     To learn about care at [facility], please go to [email or web address].     [facility] clinical trials (COVID-19 research studies): [email or web address].    Below are the COVID-19 hotlines at the [facility] ([facility]). Interpreters are available.     For health questions: Call 928-191-9249 or 1-892.824.3834 (7 a.m. to 7 p.m.)    For questions about schools and childcare: Call [phone] or  [phone] (7 a.m. to 7 p.m.)

## 2020-09-05 NOTE — TELEPHONE ENCOUNTER
"Hafsa reports that she has the following symptoms:  - Fever of 100.6F and body aches which started Wednesday 9/2 and has resolved. No fever and body aches at the time of call.  - Sinus infection symptoms: occasional blood in mucus, sinus congestion, dizziness, drowsiness, tremendous fatigue, \"little loopy\" today  - also reports mild chest discomfort when she inhales.    Asks if she can get tested for COVID. No known exposure to COVID.    Followed by  Oncology, currently in remission.     COVID 19 Nurse Triage Plan/Patient Instructions    Please be aware that novel coronavirus (COVID-19) may be circulating in the community. If you develop symptoms such as fever, cough, or SOB or if you have concerns about the presence of another infection including coronavirus (COVID-19), please contact your health care provider or visit [email or web address].     Disposition/Instructions    Virtual Visit with provider recommended. Reference Visit Selection Guide. (within the day) - [facility]    Thank you for taking steps to prevent the spread of this virus.  o Limit your contact with others.  o Wear a simple mask to cover your cough.  o Wash your hands well and often.    Resources    [facility]: About COVID-19: [email or web address]    CDC: What to Do If You're Sick: www.cdc.gov/coronavirus/2019-ncov/about/steps-when-sick.html    CDC: Ending Home Isolation: www.cdc.gov/coronavirus/2019-ncov/hcp/disposition-in-home-patients.html     CDC: Caring for Someone: www.cdc.gov/coronavirus/2019-ncov/if-you-are-sick/care-for-someone.html     [facility]: Interim Guidance for Hospital Discharge to Home: www.health.[facility].[place]/diseases/coronavirus/hcp/hospdischarge.pdf    [facility] clinical trials (COVID-19 research studies): [email or web address]     Below are the COVID-19 hotlines at the [place] [facility] ([facility]). Interpreters are available.   o For health questions: Call 716-986-3259 or " 1-132.960.4837 (7 a.m. to 7 p.m.)  o For questions about schools and childcare: Call 039-568-4592 or 1-943.694.2907 (7 a.m. to 7 p.m.)           Ida Delacruz RN/[place] Nurse Advisor        Reason for Disposition    Chest pain or pressure    HIGH RISK patient (e.g., age > 64 years, diabetes, heart or lung disease, weak immune system) (Exception: Has already been evaluated by healthcare provider and has no new or worsening symptoms)    Additional Information    Negative: SEVERE difficulty breathing (e.g., struggling for each breath, speaks in single words)    Negative: Difficult to awaken or acting confused (e.g., disoriented, slurred speech)    Negative: Bluish (or gray) lips or face now    Negative: Shock suspected (e.g., cold/pale/clammy skin, too weak to stand, low BP, rapid pulse)    Negative: Sounds like a life-threatening emergency to the triager    Negative: SEVERE or constant chest pain or pressure (Exception: mild central chest pain, present only when coughing)    Negative: MODERATE difficulty breathing (e.g., speaks in phrases, SOB even at rest, pulse 100-120)    Negative: Patient sounds very sick or weak to the triager    Negative: MILD difficulty breathing (e.g., minimal/no SOB at rest, SOB with walking, pulse <100)    Protocols used: CORONAVIRUS (COVID-19) DIAGNOSED OR [id number] 8.4.20

## 2020-09-05 NOTE — PROGRESS NOTES
4:28 PM called once.     Hafsa Corona with presents with 2-3 days symptoms including congestion, fevers for a day, some bloody mucous, mild chest discomfort, lightheadedness.     No shortness of breath.the fever is improved.     Treatment measures tried include None tried.exposure  No exposure known  No recent travel    Current Outpatient Medications   Medication Sig Dispense Refill     Acetaminophen (TYLENOL PO) Take 500 mg by mouth 2 tabs prn pain (monthly)       atorvastatin (LIPITOR) 80 MG tablet Take 1 tablet (80 mg) by mouth daily 90 tablet PRN     B Complex Vitamins (VITAMIN B-COMPLEX PO) Take 50 mg by mouth        CALCIUM-MAGNESIUM-VITAMIN D PO Take 2 tablets by mouth daily       Cholecalciferol (VITAMIN D) 1000 UNIT capsule Take 2,000 Units by mouth daily        DiphenhydrAMINE HCl (BENADRYL PO) Take 50 mg by mouth daily as needed (monthly)       EPINEPHrine (EPIPEN/ADRENACLICK/OR ANY BX GENERIC EQUIV) 0.3 MG/0.3ML injection 2-pack Inject 0.3 mLs (0.3 mg) into the muscle once as needed for anaphylaxis (Patient not taking: Reported on 4/8/2020) 0.3 mL PRN     escitalopram (LEXAPRO) 20 MG tablet Take 1 tablet (20 mg) by mouth daily 90 tablet 3     fluticasone (FLONASE) 50 MCG/ACT nasal spray SHAKE LIQUID AND USE 2 SPRAYS IN EACH NOSTRIL DAILY 48 g 3     lisinopril (PRINIVIL/ZESTRIL) 5 MG tablet Take 1 tablet (5 mg) by mouth daily (Patient not taking: Reported on 4/8/2020) 90 tablet PRN     LORazepam (ATIVAN) 1 MG tablet Take 1 tablet (1 mg) by mouth every 6 hours as needed (nausea/vomiting, anxiety or sleep) 30 tablet 1     Magnesium Hydroxide (MAGNESIA PO) Take 500 mg by mouth       metFORMIN (GLUCOPHAGE-XR) 500 MG 24 hr tablet TAKE 2 TABLETS BY MOUTH EVERY DAY WITH THE EVENING MEAL 180 tablet PRN     Multiple Vitamins-Minerals (MULTIVITAMIN ADULT PO) Take 1 tablet by mouth daily       nitroFURantoin macrocrystal-monohydrate (MACROBID) 100 MG capsule Take one capsule after intercourse as needed (Patient not  taking: Reported on 2/7/2020) 30 capsule PRN     Probiotic Product (PRO-BIOTIC BLEND PO) Take 1 capsule by mouth daily Enzymatic Therapy-probiotic pearls       rivaroxaban ANTICOAGULANT (XARELTO) 20 MG TABS tablet Take 1 tablet (20 mg) by mouth daily (with dinner) 30 tablet 11     tamoxifen (NOLVADEX) 10 MG tablet Cut each pill in 1/2 with pill cutter to ensure 5 mg daily dose. 45 tablet 3     traZODone (DESYREL) 50 MG tablet TAKE 1 TO 2 TABLETS(50  MG) BY MOUTH EVERY NIGHT AS NEEDED FOR SLEEP 180 tablet PRN       ROS otherwise negative for resp., ID,  HEENT symptoms.    Objective: LMP 01/01/2009   Exam:    No apparent distress on the phone,     Mental status appears normal.       ICD-10-CM    1. Nasal congestion  R09.81 Symptomatic COVID-19 Virus (Coronavirus) by PCR     COVID-19 [facility] Referral     likely viral. Will test for covid. The patient has a history of sinus infections recommended follow up if the symptoms are not improving at a 7-10 days .    Phone call duration 12 minutes.

## 2020-09-09 DIAGNOSIS — R09.81 NASAL CONGESTION: ICD-10-CM

## 2020-09-10 LAB
SARS-COV-2 RNA SPEC QL NAA+PROBE: NOT DETECTED
SPECIMEN SOURCE: NORMAL

## 2020-09-18 DIAGNOSIS — Z86.718 PERSONAL HISTORY OF DVT (DEEP VEIN THROMBOSIS): ICD-10-CM

## 2020-09-18 NOTE — PROGRESS NOTES
Last seen 9/19 with instruction to stay on Xarelto 20 mg for 1 year and follow up. Needs to be scheduled, will give enough refills to get patient to appointment.     Liseth Freeman, MSN, RN, PHN - Nurse Clinician - [facility] - [phone]

## 2020-09-30 NOTE — PROGRESS NOTES
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 She completed treatment with surgery and three cycles of chemotherapy on 10/16/17. She is here today for a surveillance visit.     Oncology History:  The patient has had a recent episode of lower abdominal pain in early July.  She was subsequently sent to the emergency room and was found to have a large 9 cm complex ovarian mass. She was admitted to the hospital for pain control.  7/3/17:  1187  17: Exploratory laparotomy, total abdominal hysterectomy, left salpingo-oophorectomy, cancer staging including infracolic omentectomy, bilateral pelvic & para-aortic lymphadenectomy, peritoneal biopsies, evacuation of pelvic fluid by Dr. Cole.    FINAL DIAGNOSIS:   A. LEFT OVARY AND FALLOPIAN TUBE, LEFT SALPINGO-OOPHORECTOMY:   - Ovarian clear cell carcinoma   - Background of endometriosis   - Fallopian tube with no significant histologic abnormality.   B. UTERUS, HYSTERECTOMY:   -  Inactive endometrium   -  Myometrium with adenomyosis and leiomyoma.   -  Cervix with squamous metaplasia.   C. PERITONEUM, RIGHT PELVIS, BIOPSY:   - Fibroadipose tissue, negative for malignancy.   D. LYMPH NODES, RIGHT PELVIC, DISSECTION:   - Thirteen benign lymph nodes (0/13).   E. LYMPH NODES, LEFT PELVIC, DISSECTION:   - Seven benign lymph nodes (0/7).   F. PERITONEUM, LEFT PELVIS, BIOPSY:   - Fibroadipose tissue, negative for malignancy.   G. PERITONEUM, BLADDER, BIOPSY:   - Fibroadipose tissue  with acute and chronic inflammation, negative for malignancy.   H. PERITONEUM, POSTERIOR CUL-DE-SAC, BIOPSY:   - Fibroadipose tissue, negative for malignancy.   I. PERITONEUM, LEFT PARACOLIC GUTTER, BIOPSY:   - Fibroadipose tissue, negative for malignancy.   J. LYMPH NODES, LEFT PARA-AORTIC, DISSECTION:   - Five  benign lymph nodes (0/5).   K. LYMPH NODES, RIGHT PARA-AORTIC, DISSECTION:   - Two benign lymph nodes (0/2).   L. PERITONEUM, RIGHT PARACOLIC GUTTER, BIOPSY:   - Fibroadipose tissue, negative for malignancy.   M. OMENTUM, OMENTECTOMY:   - Adipose tissue with reactive changes, negative for malignancy.   COMMENT:   The final diagnosis confirms the interpretation provided intraoperatively.   Report Name: Ovary or Fallopian Tube   Status: Submitted   Part(s) Involved:   A: Ovary and fallopian tube, left   Synoptic Report:   CLINICAL   Clinical History:   - Pelvic mass   SPECIMEN   Procedure:   - Left salpingo-oophorectomy   - Hysterectomy   - Omentectomy   - Peritoneal  biopsies   Lymph Node Sampling:   - Performed   Location:   - Pelvic lymph nodes   - Para-aortic lymph nodes   Specimen Integrity:   - Left ovary   Specimen Integrity of Left Ovary:   - Capsule intact   TUMOR   Primary Tumor Site(s):   - Left ovary   Left Ovary   Tumor Size of Left Ovary: 19 cm   Histologic Type:   - Clear cell carcinoma   Tumor Extent   Ovarian Surface Involvement:   - Absent   Specimen(s)   Extent of Left Ovary:   - Involved   Extent of Left Fallopian Tube:   - Not involved   Extent of Omentum:   - Not involved   Extent of Uterus:   - Not involved   Extent of Peritoneum:   - Not involved   Peritoneal Ascitic Fluid:   - Not performed / unknown   Pleural Fluid:   - Not performed / unknown   LYMPH NODES     Number of Pelvic Lymph Nodes Examined: 20     Number of Pelvic Lymph Nodes Involved: None identified     Number of Para-aortic Lymph Nodes Examined: 7     Number of Para-Aortic Lymph Nodes Involved: None identified   STAGE (PTNM, AJCC 7TH ED.)   Primary Tumor (pT):   - Ovary   Ovarian Primary Tumor (pT):   - pT1a: Tumor limited to 1 ovary; capsule intact, no tumor on ovarian surface. No malignant cells in ascites or peritoneal washings#   Regional Lymph Nodes (pN):   - pN0: No regional lymph node metastasis       07/31/17:   Edmund follow up: Discussed with the patient that given the high risk histology, as well as ruptured mass before surgery, she would be qualified at higher risk of recurrence despite early stage ovarian cancer.  Recommended adjuvant chemotherapy. Plan for carboplatin of AUC of 6 and paclitaxel of 175, m2 for 3 cycles. Referral for genetic counselor and will see her back after those 3 cycles  08/03/17: Call from patient stating she is going for a second opinion and would like chemotherapy rescheduled to week of 8/14/17.      08/16/17: Cycle #1 Carbo/Taxol.  73. Significant paclitaxel reaction.  08/30/17: C1 carboplatin/inpatient paclitaxel desensitization.   09/25/17: C2 carboplatin/paclitaxel- inpatient paclitaxel desensitization.  15.  10/16/17: C3 carboplatin/paclitaxel- switched to docetaxel given her neuropathy.  10.  11/14/17:  8. CT CAP:  IMPRESSION:   1. Post surgical changes of hysterectomy and bilateral  salpingo-oophorectomy. Nodular soft tissue density in the posterior  cul-de-sac along with ill-defined nodular thickening in the left  pelvis, suspicious for residual disease or metastatic deposits.   2. There is a 3 cm mass in the superior pole of the right kidney,  possibly extending into the renal hilum. Represents RCC until proven  otherwise. Consider renal mass protocol CT to assess renal vein  involvement.  3. Stable sub-4 mm pulmonary nodules, continued attention on  follow-up.     11/16/17: CT renal mass protocol  IMPRESSION:  1. Arterially enhancing mass measuring 3.6 x 2.1 x 2.2 cm mass arising  from superior posterior of the right kidney, this is renal cell  carcinoma until proven otherwise.  2. Stable fat-containing umbilical hernia.     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 Margins negative. Three month surveillance plan with [facility].     2/26/18:  14     5/22/18:  11      8/23/18:  11     12/12/18:   10     3/8/19:  12     6/19/19:  13     9/23/19:  12    11/8/19: CT Chest:   IMPRESSION:   1. Stable, sub-4 mm pulmonary nodules as above.  2. Stable hypodense, subcentimeter nodules in the thyroid.     3/27/20:  14    10/1/20:  15    HPI:   Today Hafsa comes to the clinic feeling well. She denies any gynecological concerns. She takes nitrofurantoin as post-coital for UTI prophylaxis. She is on Xarelto daily for hx of DVT. She still has her port in place, having this flushed infrequently- does not want it out yet due to fear of recurrence and the possible need for the port in the future. She is sexually active. She walks her dog daily for ten minutes for activity. In September she felt flu like with fever and lost some weight. She had a COVID test that was negative. She has since gained back the weight and is trying to eat better and incorporate more exercise. She denies any vaginal bleeding, no changes in her bowel or bladder habits, no nausea/emesis, and no difficulties eating or sleeping. She denies any abdominal discomfort/bloating, no fevers or chills, and no chest pain or shortness of breath. She up to date on breast screening (high risk screening through [facility]). She is due to see nephrology and would like to change over to the [place] for this. She was going to [place]. She is having bladder incontinence with standing and sneezing. She has not been doing kegels. She wants to be referred to pelvic floor physical therapy. She is up to date on her colonoscopy (1/6/20). She is due to see her PCP.     Review of Systems     Constitutional:  Positive for weight gain, fatigue and height loss. Negative for fever, chills, weight loss, decreased appetite, night sweats, recent stressors, post-operative complications, incisional pain, hallucinations, increased energy, hyperactivity and confused.   HENT:  Positive for nosebleeds, sinus pain and sinus congestion. Negative for ear  pain, hearing loss, tinnitus, trouble swallowing, hoarse voice, mouth sores, sore throat, ear discharge, tooth pain, gum tenderness, taste disturbance, smell disturbance, hearing aid, bleeding gums, dry mouth and neck mass.    Eyes:  Negative for double vision, pain, redness, eye pain, decreased vision, eye watering, eye bulging, eye dryness, flashing lights, spots, floaters, strabismus, tunnel vision, jaundice and eye irritation.   Respiratory:   Negative for cough, hemoptysis, sputum production, shortness of breath, wheezing, sleep disturbances due to breathing, snores loudly, respiratory pain, dyspnea on exertion, cough disturbing sleep and postural dyspnea.    Cardiovascular:  Positive for palpitations, leg pain and light-headedness. Negative for chest pain, dyspnea on exertion, orthopnea, fingers/toes turn blue, hypertension, hypotension, syncope, history of heart murmur, pacemaker, few scattered varicosities, sleep disturbances due to breathing, exercise intolerance and edema.   Gastrointestinal:  Negative for heartburn, nausea, vomiting, abdominal pain, diarrhea, constipation, blood in stool, melena, rectal pain, bloating, hemorrhoids, bowel incontinence, jaundice, rectal bleeding, coffee ground emesis and change in stool.   Genitourinary:  Positive for bladder incontinence. Negative for dysuria, urgency, hematuria, flank pain, vaginal discharge, difficulty urinating, genital sores, dyspareunia, decreased libido, nocturia, voiding less frequently, arousal difficulty, abnormal vaginal bleeding, excessive menstruation, menstrual changes, hot flashes, vaginal dryness and postmenopausal bleeding.   Musculoskeletal:  Positive for myalgias, back pain, arthralgias and muscle cramps. Negative for joint swelling, stiffness, neck pain, bone pain, muscle weakness and fracture.   Skin:  Negative for nail changes, itching, poor wound healing, rash, hair changes, skin changes, acne, warts, poor wound healing, scarring,  flaky skin, Raynaud's phenomenon, sensitivity to sunlight and skin thickening.   Neurological:  Positive for light-headedness. Negative for dizziness, tingling, tremors, speech change, seizures, loss of consciousness, weakness, numbness, headaches, disturbances in coordination, extremity numbness, memory loss, difficulty walking and paralysis.   Endo/Heme:  Negative for anemia, swollen glands and bruises/bleeds easily.   Psychiatric/Behavioral:  Negative for depression, hallucinations, memory loss, decreased concentration, mood swings and panic attacks.    Breast:  Negative for breast discharge, breast mass, breast pain and nipple retraction.   Endocrine:  Negative for altered temperature regulation, polyphagia, polydipsia, unwanted hair growth and change in facial hair.      Past Medical History:    Past Medical History:   Diagnosis Date     Anxiety state, unspecified     [id number]     Arthritis 2014    vague, gradual, not treated really, knees feel it     At high risk for breast cancer 09/27/2019    30.3% lifetime risk by SHERON model     Attention deficit disorder without mention of hyperactivity      Cancer (H) 7/2017 and 1/2018    ovarian and kidney cancers, both treated well     Chronic rhinitis      Depressive disorder lifetime    plus Anxiety plus ADD. Escitalipram, therapy, ADD meds maybe     Depressive disorder, not elsewhere classified      Heart disease 1999    tachycardia and high cholesterol, managed     History of blood transfusion 7/2017    while in hospital for ovarian cancer     Hypertension 1999    tho BP was too LOW last week. past 12 years Generally OK     Panic disorder without agoraphobia      Pure hypercholesterolemia     Lipitor     Tachycardia      Tachycardia, unspecified     9/1999-2/2004     Type II or unspecified type diabetes mellitus without mention of complication, not stated as uncontrolled     diagnosed 2004         Past Surgical History:    Past Surgical History:   Procedure  Laterality Date     AS REMV KIDNEY,RADICAL Right      BIOPSY  7/2017 and 2/2018    ovarian and kidney cancer biopsies. also 1987 breast benign     BREAST SURGERY  1987    date unsure. benign breast cyst removed     CATARACT IOL, RT/LT Left 05/18/2018     CATARACT IOL, RT/LT Right 07/1318     COLONOSCOPY  2008 and 2013    polyps removed. Next due fall 2018     HYSTERECTOMY TOTAL ABDOMINAL, BILATERAL SALPINGO-OOPHORECTOMY, NODE DISSECTION, COMBINED Left 7/7/2017    Procedure: COMBINED HYSTERECTOMY TOTAL ABDOMINAL, SALPINGO-OOPHORECTOMY, NODE DISSECTION;  Exploratory Laparotomy, Left Salpingo-Oophorectomy, Cancer Staging, Total Hysterectomy, Omentectomy, Evacuation of abdominal fluid, Lymph Node Dissection  Anesthesia Block ;  Surgeon: Serena Cole MD;  Location: [facility] OR     HYSTERECTOMY, PAP NO LONGER INDICATED  07/07/2017    Laparotomy; for ovarian cancer staging     HYSTERECTOMY, PAP NO LONGER INDICATED       INSERT PORT VASCULAR ACCESS Right 8/21/2017    Procedure: INSERT PORT VASCULAR ACCESS;  Single Lumen Chest Power Port;  Surgeon: Stephen Mike PA-C;  Location: [facility] OR     LYMPHADENECTOMY RETROPERITONEAL Bilateral 07/07/2017    Laparotomy; pelvics & para-aortics; for ovarian cancer staging     OMENTECTOMY  07/07/2017    Laparotomy; for ovarian cancer staging     ORTHOPEDIC SURGERY  1/2002    broken left jaw due to fall, 4 fractures, titanium plates     PHACOEMULSIFICATION CLEAR CORNEA WITH STANDARD INTRAOCULAR LENS IMPLANT Right 7/13/2018    Procedure: PHACOEMULSIFICATION CLEAR CORNEA WITH STANDARD INTRAOCULAR LENS IMPLANT;  RIght Eye Phacoemulsification Clear Cornea with Standard Intraocular Lens Placement ;  Surgeon: Mary Jo Massey MD;  Location: [facility] OR     PHACOEMULSIFICATION CLEAR CORNEA WITH TORIC INTRAOCULAR LENS IMPLANT Left 5/18/2018    Procedure: PHACOEMULSIFICATION CLEAR CORNEA WITH TORIC INTRAOCULAR LENS IMPLANT;  Left Eye Phacoemulsification with Toric Lens;  Surgeon: Mary Jo Massey  MD GRAY;  Location: [facility] OR     SALPINGO OOPHORECTOMY,R/L/KAYY Right 2008    Salpingo Oophorectomy, RT     SALPINGO OOPHORECTOMY,R/L/KAYY Left 07/07/2017    Laparotomy; for ovarian cancer staging     SURGICAL HISTORY OF -       facial surgery d/t fall in 1/2002     SURGICAL HISTORY OF -       1985 removal of breast cyst     SURGICAL HISTORY OF -   2008    endometrial ablation     YAG CAPSULOTOMY OD (RIGHT EYE)  10/22/2018         Health Maintenance Due   Topic Date Due     DEPRESSION ACTION PLAN  1955     HEPATITIS B IMMUNIZATION (1 of 3 - Risk 3-dose series) 03/09/1973     ZOSTER IMMUNIZATION (1 of 2) 03/09/2004     Pneumococcal Vaccine: Pediatrics (0 to 5 Years) and At-Risk Patients (6 to 64 Years) (2 of 3 - PCV13) 11/19/2014     PHQ-9  03/06/2017     MEDICARE ANNUAL WELLNESS VISIT  03/09/2019     Pneumococcal Vaccine: 65+ Years (1 of 1 - PPSV23) 03/09/2019     EYE EXAM  11/28/2019     A1C  04/01/2020     INFLUENZA VACCINE (1) 09/01/2020     FALL RISK ASSESSMENT  09/27/2020     MICROALBUMIN  10/01/2020     DIABETIC FOOT EXAM  10/01/2020       Current Medications:     Current Outpatient Medications   Medication Sig Dispense Refill     Acetaminophen (TYLENOL PO) Take 500 mg by mouth 2 tabs prn pain (monthly)       atorvastatin (LIPITOR) 80 MG tablet Take 1 tablet (80 mg) by mouth daily 90 tablet PRN     B Complex Vitamins (VITAMIN B-COMPLEX PO) Take 50 mg by mouth        CALCIUM-MAGNESIUM-VITAMIN D PO Take 2 tablets by mouth daily       Cholecalciferol (VITAMIN D) 1000 UNIT capsule Take 2,000 Units by mouth daily        DiphenhydrAMINE HCl (BENADRYL PO) Take 50 mg by mouth daily as needed (monthly)       escitalopram (LEXAPRO) 20 MG tablet Take 1 tablet (20 mg) by mouth daily 90 tablet 3     fluticasone (FLONASE) 50 MCG/ACT nasal spray SHAKE LIQUID AND USE 2 SPRAYS IN EACH NOSTRIL DAILY 48 g 3     Magnesium Hydroxide (MAGNESIA PO) Take 500 mg by mouth       metFORMIN (GLUCOPHAGE-XR) 500 MG 24 hr tablet TAKE 2  TABLETS BY MOUTH EVERY DAY WITH THE EVENING MEAL 180 tablet PRN     Multiple Vitamins-Minerals (MULTIVITAMIN ADULT PO) Take 1 tablet by mouth daily       nitroFURantoin macrocrystal-monohydrate (MACROBID) 100 MG capsule Take one capsule after intercourse as needed 30 capsule PRN     Probiotic Product (PRO-BIOTIC BLEND PO) Take 1 capsule by mouth daily Enzymatic Therapy-probiotic pearls       rivaroxaban ANTICOAGULANT (XARELTO) 20 MG TABS tablet Take 1 tablet (20 mg) by mouth daily (with dinner) 30 tablet 1     tamoxifen (NOLVADEX) 10 MG tablet Take 0.5 tablets (5 mg) by mouth daily Cut each pill in 1/2 with pill cutter to ensure 5 mg daily dose. 45 tablet 1     traZODone (DESYREL) 50 MG tablet TAKE 1 TO 2 TABLETS(50  MG) BY MOUTH EVERY NIGHT AS NEEDED FOR SLEEP 180 tablet PRN     EPINEPHrine (EPIPEN/ADRENACLICK/OR ANY BX GENERIC EQUIV) 0.3 MG/0.3ML injection 2-pack Inject 0.3 mLs (0.3 mg) into the muscle once as needed for anaphylaxis (Patient not taking: Reported on 4/8/2020) 0.3 mL PRN     lisinopril (PRINIVIL/ZESTRIL) 5 MG tablet Take 1 tablet (5 mg) by mouth daily (Patient not taking: Reported on 4/8/2020) 90 tablet PRN     LORazepam (ATIVAN) 1 MG tablet Take 1 tablet (1 mg) by mouth every 6 hours as needed (nausea/vomiting, anxiety or sleep) (Patient not taking: Reported on 10/7/2020) 30 tablet 1     tamoxifen (NOLVADEX) 10 MG tablet Cut each pill in 1/2 with pill cutter to ensure 5 mg daily dose. (Patient not taking: Reported on 10/7/2020) 45 tablet 3         Allergies:        Allergies   Allergen Reactions     Paclitaxel Anaphylaxis and Difficulty breathing     Wasps [Hornets] Anaphylaxis     Yellow Hornet Venom [Hornet Venom] Anaphylaxis and Swelling     Yellow Jacket Venom [Venomil Honey Bee] Anaphylaxis and Swelling     Sulfa Drugs Hives and Rash        Social History:     Social History     Tobacco Use     Smoking status: Never Smoker     Smokeless tobacco: Never Used   Substance Use Topics      Alcohol use: Yes     Comment: Very infrequent, a bit of wine or beer 2x per month maybe       History   Drug Use     Types: Marijuana     Comment: infrequent, for celebrations only, maybe 8x per year         Family History:     The patient's family history is notable for:     Family History   Problem Relation Age of Onset     C.A.D. Father      Diabetes Father         Later in his life, in his 70s     Hypertension Father      Cerebrovascular Disease Father         1984 or      Psychotic Disorder Father      Pancreatic Cancer Father      Coronary Artery Disease Father         diagnosed in his late 50s (age)     Hyperlipidemia Father         treated w statins     Other Cancer Father         pancreatic, ,  of this     Depression Father      Mental Illness Father         likely PTSD and depression     Obesity Father      C.A.D. Mother      Hypertension Mother      Breast Cancer Mother         2 times,  and ?     Psychotic Disorder Mother      Colon Cancer Mother         ?     Cancer Mother         Bone cancer     Coronary Artery Disease Mother         diagnosed in her late 70s (age)     Hyperlipidemia Mother         treated w. statins     Other Cancer Mother         bone/marrow, ,  of this     Depression Mother      Anxiety Disorder Mother      Mental Illness Mother         various undiagnosed types, but THERE     Obesity Mother      Prostate Problems Brother         cleared      Hypertension Brother      Hyperlipidemia Brother         unsure if treated w statins     Depression Brother      Anxiety Disorder Brother      Mental Illness Brother         undiagnosed but THERE     Obesity Brother      Psychotic Disorder Sister         x2     Neurologic Disorder Sister      Hypertension Sister      Hyperlipidemia Sister         treated w statins     Depression Sister      Anxiety Disorder Sister      Obesity Sister      Psychotic Disorder Brother         x2     Depression Brother          major depressive disorder and OCD     Anxiety Disorder Brother      Mental Illness Brother         not sure of diagnoses, treated     Hypertension Brother      Hyperlipidemia Brother      Psychotic Disorder Maternal Grandmother         ?     Coronary Artery Disease Maternal Grandmother          of heart attack age 84?     Depression Maternal Grandmother      Psychotic Disorder Maternal Grandfather         ?     Psychotic Disorder Paternal Grandmother         Schizophernia     Coronary Artery Disease Paternal Grandmother          of heart attack, age 85?     Depression Paternal Grandmother         severe, but recovered     Mental Illness Paternal Grandmother         possible bipolar or other     Psychotic Disorder Paternal Grandfather         ?     Respiratory Paternal Grandfather          of emphysema and smoking     Psychotic Disorder Sister      Other - See Comments Sister         small kidney stone      Hypertension Sister      Hyperlipidemia Sister         treated w statins     Depression Sister      Anxiety Disorder Sister      Cancer - colorectal No family hx of      Glaucoma No family hx of      Macular Degeneration No family hx of          Physical Exam:     /74   Pulse 97   Temp 98.2  F (36.8  C) (Oral)   Resp 16   Wt 97.1 kg (214 lb)   LMP 2009   SpO2 96%   BMI 37.91 kg/m    Body mass index is 37.91 kg/m .    General Appearance: healthy and alert, no distress     HEENT: no thyromegaly, no palpable nodules or masses        Cardiovascular: regular rate and rhythm, no gallops, rubs or murmurs     Respiratory: lungs clear, no rales, rhonchi or wheezes    Musculoskeletal: extremities non tender with 1+ BL LE edema     Skin: no lesions or rashes; small redness noted at underwear line     Neurological: normal gait, no gross defects     Psychiatric: appropriate mood and affect                               Hematological: normal cervical, supraclavicular and inguinal lymph  nodes     Gastrointestinal:       abdomen soft, non-tender, non-distended, no organomegaly or masses    Genitourinary: External genitalia and urethral meatus appears normal. Vagina is smooth without nodularity or masses. Vaginal prolapse contained within vagina. Cervix appears surgically absent.  Bimanual exam reveal no masses, nodularity or fullness.  Recto-vaginal exam confirms these findings.      Assessment:    Hafsa Corona is a 65 year old woman with a diagnosis of stage IC2 clear cell carcinoma of the ovary. She completed treatment with surgery and three cycles of chemotherapy on 10/16/17. She is here today for a surveillance visit.      A total of 35 minutes was spent with the patient, over 50% minutes of which were spent in counseling the patient and/or treatment planning.      Plan:     1.)         Three years out from treatment, may continue surveillance every six months x3 years (through 10/2022) then annually thereafter with  and pelvic/rectal exam. Continue q6wk port flushes, may consider removal when she would like- discussed her concerns about recurring if she has this removed. She is aware to continue over the counter topical yeast cream on her underwear line skin redness and to try to keep her folds dry to decrease yeast. Reviewed signs and symptoms for when she should contact the clinic or seek additional care. Patient to contact the clinic with any questions or concerns in the interim.     2.) Genetic testing: Hafsa is negative for mutations in the AIP, ALK, APC, COSTA, BAP1, BARD1, BLM, BRCA1, BRCA2, BRIP1, BMPR1A, CDH1, CDK4, CDKN1B, CDKN2A, CHEK2, DICER1, EPCAM, FANCC, FH, FLCN, GALNT12, GREM1, HOXB13, MAX, MEN1, MET, MITF, MLH1, MRE11A, MSH2, MSH6, MUTYH, NBN, NF1, NF2, PALB2, PHOX2B, PMS2, POLD1, POLE, POT1, QZQYY2G, PTCH1, PTEN, RAD50, RAD51C, RAD51D, RB1, RET, SDHA, SDHAF2, SDHB, SDHC, SDHD, SMAD4, SMARCA4, SMARCB1, SMARCE1, STK11, SUFU, BEMQ809, TP53, TSC1, TSC2, VHL, and XRCC2 genes.  No mutations were found in any of the 67 genes analyzed. High risk breast screening. Sees [facility]. On low dose tamoxifen since 10/10/2019 for prevention of breast cancer.     3.) Labs and/or tests ordered include:  Port flushes every 6 weeks; Ca 125 and port flush with NP visit in six months; referral for nephrology at the [facility], referral for pelvic floor physical therapy      4.) Health maintenance issues addressed today include annual health maintenance and non-gynecologic issues with PCP. She is due to see PCP and will call to schedule this appt. She will follow up with Pelvic floor PT and Nephrology here at the .     5.)        Lung nodules: stable for two years on CT considered statistically benign and no further imaging needed unless new symptoms.     6.)        Urinary incontinence: stress and urge. Will refer to pelvic floor physical therapy for this. She is aware to start incorporating kegel exercises into her daily routine. We discussed a urogynecology referral but she would like to start with pelvic floor PT first. She denies any s/s of UTI.       Iris Dalton, MSN, [facility]  Women's Health Nurse Practitioner  Division of Gynecologic Oncology        CC  Patient Care Team:  Morelia Ayon NP as PCP - General  Karla Oswald, RN as Continuity Care Coordinator (Gyn-Onc)  Nabeel Dick MD as MD (Urology)  Javier Gregorio MD as MD (Urology)  Lola Vasquez, PhD LP as Psychologist (Psychology)  Morelia Ayon NP as Assigned PCP

## 2020-10-01 ENCOUNTER — ANCILLARY PROCEDURE (OUTPATIENT)
Dept: MAMMOGRAPHY | Facility: CLINIC | Age: 65
End: 2020-10-01
Attending: CLINICAL NURSE SPECIALIST
Payer: COMMERCIAL

## 2020-10-01 VITALS
OXYGEN SATURATION: 98 % | HEART RATE: 104 BPM | SYSTOLIC BLOOD PRESSURE: 110 MMHG | DIASTOLIC BLOOD PRESSURE: 76 MMHG | BODY MASS INDEX: 37.2 KG/M2 | WEIGHT: 210 LBS | TEMPERATURE: 98.7 F | RESPIRATION RATE: 18 BRPM

## 2020-10-01 DIAGNOSIS — Z12.39 BREAST CANCER SCREENING, HIGH RISK PATIENT: ICD-10-CM

## 2020-10-01 DIAGNOSIS — Z91.89 AT HIGH RISK FOR BREAST CANCER: ICD-10-CM

## 2020-10-01 DIAGNOSIS — C56.9 MALIGNANT NEOPLASM OF OVARY, UNSPECIFIED LATERALITY (H): ICD-10-CM

## 2020-10-01 LAB — CANCER AG125 SERPL-ACNC: 15 U/ML (ref 0–30)

## 2020-10-01 PROCEDURE — 77067 SCR MAMMO BI INCL CAD: CPT | Mod: GC | Performed by: RADIOLOGY

## 2020-10-01 PROCEDURE — 86304 IMMUNOASSAY TUMOR CA 125: CPT | Performed by: OBSTETRICS & GYNECOLOGY

## 2020-10-01 PROCEDURE — 77063 BREAST TOMOSYNTHESIS BI: CPT | Mod: GC | Performed by: RADIOLOGY

## 2020-10-01 RX ORDER — HEPARIN SODIUM (PORCINE) LOCK FLUSH IV SOLN 100 UNIT/ML 100 UNIT/ML
5 SOLUTION INTRAVENOUS EVERY 8 HOURS PRN
Status: DISCONTINUED | OUTPATIENT
Start: 2020-10-01 | End: 2020-10-09 | Stop reason: HOSPADM

## 2020-10-01 ASSESSMENT — PAIN SCALES - GENERAL: PAINLEVEL: NO PAIN (0)

## 2020-10-01 NOTE — PROGRESS NOTES
"Hafsa Corona is a 65 year old female who is being evaluated via a billable video visit.      The patient has been notified of following:     \"This video visit will be conducted via a call between you and your physician/provider. We have found that certain health care needs can be provided without the need for an in-person physical exam.  This service lets us provide the care you need with a video conversation.  If a prescription is necessary we can send it directly to your pharmacy.  If lab work is needed we can place an order for that and you can then stop by our lab to have the test done at a later time.    Video visits are billed at different rates depending on your insurance coverage.  Please reach out to your insurance provider with any questions.    If during the course of the call the physician/provider feels a video visit is not appropriate, you will not be charged for this service.\"    Patient has given verbal consent for Video visit? Yes  How would you like to obtain your AVS? MyChart  If you are dropped from the video visit, the video invite should be resent to: Text to cell phone: [phone]  Will anyone else be joining your video visit? No        Video-Visit Details    Type of service:  Video Visit    Video Start Time: 1045  Video End Time: 1105    Originating Location (pt. Location): Home    Distant Location (provider location):  [facility] CANCER [facility]     Platform used for Video Visit: [employer]      Oncology Risk Management Consultation:  Date on this visit: 10/2/2020    Hafsa Corona is being seen via a billable video visit at the Cancer Risk Management Program.  She requires heightened screening and surveillance for her higher risk of breast cancer, secondary to a family history of breast cancer in her mother at age 63.       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     She is considered to at high risk for breast cancer and " has a 30.3% lifetime risk for breast cancer by the SHERON model. She has a 10.8% 5 year risk by the SHERON model.     Primary Physician:  MARLON Reilly-CNP    History Of Present Illness:  Ms. Corona is a very pleasant  65 year old female who presents with family history of breast cancer.     Genetic testin2017 - Negative for mutations in the AIP, ALK, APC, COSTA, BAP1, BARD1, BLM, BRCA1, BRCA2, BRIP1, BMPR1A, CDH1, CDK4, CDKN1B, CDKN2A, CHEK2, DICER1, EPCAM, FANCC, FH, FLCN, GALNT12, GREM1, HOXB13, MAX, MEN1, MET, MITF, MLH1, MRE11A, MSH2, MSH6, MUTYH, NBN, NF1, NF2, PALB2, PHOX2B, PMS2, POLD1, POLE, POT1, IECQN2Y, PTCH1, PTEN, RAD50, RAD51C, RAD51D, RB1, RET, SDHA, SDHAF2, SDHB, SDHC, SDHD, SMAD4, SMARCA4, SMARCB1, SMARCE1, STK11, SUFU, XDAU661, TP53, TSC1, TSC2, VHL, and XRCC2 genes using a Cancer-Next Expanded panel through [employer].     Pertinent history:  - R salpingo oophorectomy for abnormal imaging (enlarged ovary), biopsy showed endometriosis  2017 - Stage 1C2 Clear cell carcinoma of the L ovary, s/p HELENE/LSO and chemotherapy.  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   Nulliparous.  Menarche at 10.  Menopause: age 54  Hx of OCPs x6 years.  No hx of HRT.  Density: heterogeneously dense  No history of breast biopsy.  Hx of breast cyst in , aspirated   No history of hyperplasia, atypia, or malignancy  On low dose tamoxifen since 10/10/2019 for prevention of breast cancer     Pertinent screening history:  2011 - Screening mammogram, normal by report   2014 - Screening mammogram, normal by report  9/15/2017 - Screening mammogram, BiRads1.    2018 - Breast MRI, BiRads2.  2018: Screening tomosynthesis mammogram, BI-RADS 1.  2019: Breast MRI, BI-RADS 1.  2019- Screening tomosynthesis mammogram, BiRads1  10/1/2020- Screening mammogram, BiRads       At this visit, she denies asymmetry, lumps, masses,  thickening, pain, nipple discharge and skin changes.      Past Medical/Surgical History:  Past Medical History:   Diagnosis Date     Anxiety state, unspecified     2033-2425     Arthritis 2014    vague, gradual, not treated really, knees feel it     At high risk for breast cancer 09/27/2019    30.3% lifetime risk by SHERON model     Attention deficit disorder without mention of hyperactivity      Cancer (H) 7/2017 and 1/2018    ovarian and kidney cancers, both treated well     Chronic rhinitis      Depressive disorder lifetime    plus Anxiety plus ADD. Escitalipram, therapy, ADD meds maybe     Depressive disorder, not elsewhere classified      Heart disease 1999    tachycardia and high cholesterol, managed     History of blood transfusion 7/2017    while in hospital for ovarian cancer     Hypertension 1999    tho BP was too LOW last week. past 12 years Generally OK     Panic disorder without agoraphobia      Pure hypercholesterolemia     Lipitor     Tachycardia      Tachycardia, unspecified     9/1999-2/2004     Type II or unspecified type diabetes mellitus without mention of complication, not stated as uncontrolled     diagnosed 2004     Past Surgical History:   Procedure Laterality Date     AS REMV KIDNEY,RADICAL Right      BIOPSY  7/2017 and 2/2018    ovarian and kidney cancer biopsies. also 1987 breast benign     BREAST SURGERY  1987    date unsure. benign breast cyst removed     CATARACT IOL, RT/LT Left 05/18/2018     CATARACT IOL, RT/LT Right 07/1318     COLONOSCOPY  2008 and 2013    polyps removed. Next due fall 2018     HYSTERECTOMY TOTAL ABDOMINAL, BILATERAL SALPINGO-OOPHORECTOMY, NODE DISSECTION, COMBINED Left 7/7/2017    Procedure: COMBINED HYSTERECTOMY TOTAL ABDOMINAL, SALPINGO-OOPHORECTOMY, NODE DISSECTION;  Exploratory Laparotomy, Left Salpingo-Oophorectomy, Cancer Staging, Total Hysterectomy, Omentectomy, Evacuation of abdominal fluid, Lymph Node Dissection  Anesthesia Block ;  Surgeon: Serena Cole  MD Kana;  Location: [facility] OR     HYSTERECTOMY, PAP NO LONGER INDICATED  07/07/2017    Laparotomy; for ovarian cancer staging     HYSTERECTOMY, PAP NO LONGER INDICATED       INSERT PORT VASCULAR ACCESS Right 8/21/2017    Procedure: INSERT PORT VASCULAR ACCESS;  Single Lumen Chest Power Port;  Surgeon: Stephen Mike PA-C;  Location: [facility] OR     LYMPHADENECTOMY RETROPERITONEAL Bilateral 07/07/2017    Laparotomy; pelvics & para-aortics; for ovarian cancer staging     OMENTECTOMY  07/07/2017    Laparotomy; for ovarian cancer staging     ORTHOPEDIC SURGERY  1/2002    broken left jaw due to fall, 4 fractures, titanium plates     PHACOEMULSIFICATION CLEAR CORNEA WITH STANDARD INTRAOCULAR LENS IMPLANT Right 7/13/2018    Procedure: PHACOEMULSIFICATION CLEAR CORNEA WITH STANDARD INTRAOCULAR LENS IMPLANT;  RIght Eye Phacoemulsification Clear Cornea with Standard Intraocular Lens Placement ;  Surgeon: Mary Jo Massey MD;  Location: [facility] OR     PHACOEMULSIFICATION CLEAR CORNEA WITH TORIC INTRAOCULAR LENS IMPLANT Left 5/18/2018    Procedure: PHACOEMULSIFICATION CLEAR CORNEA WITH TORIC INTRAOCULAR LENS IMPLANT;  Left Eye Phacoemulsification with Toric Lens;  Surgeon: Mary Jo Massey MD;  Location: [facility] OR     SALPINGO OOPHORECTOMY,R/L/KAYY Right 2008    Salpingo Oophorectomy, RT     SALPINGO OOPHORECTOMY,R/L/KAYY Left 07/07/2017    Laparotomy; for ovarian cancer staging     SURGICAL HISTORY OF -       facial surgery d/t fall in 1/2002     SURGICAL HISTORY OF -       1985 removal of breast cyst     SURGICAL HISTORY OF -   2008    endometrial ablation     YAG CAPSULOTOMY OD (RIGHT EYE)  10/22/2018       Allergies:  Allergies as of 10/02/2020 - Reviewed 04/08/2020   Allergen Reaction Noted     Paclitaxel Anaphylaxis and Difficulty breathing 08/30/2017     Wasps [hornets] Anaphylaxis 09/03/2009     Yellow hornet venom [hornet venom] Anaphylaxis and Swelling 05/10/2018     Yellow jacket venom [venomil honey bee] Anaphylaxis and  Swelling 05/10/2018     Sulfa drugs Hives and Rash 07/19/2005       Current Medications:  Current Outpatient Medications   Medication Sig Dispense Refill     tamoxifen (NOLVADEX) 10 MG tablet Take 0.5 tablets (5 mg) by mouth daily Cut each pill in 1/2 with pill cutter to ensure 5 mg daily dose. 45 tablet 1     Acetaminophen (TYLENOL PO) Take 500 mg by mouth 2 tabs prn pain (monthly)       atorvastatin (LIPITOR) 80 MG tablet Take 1 tablet (80 mg) by mouth daily 90 tablet PRN     B Complex Vitamins (VITAMIN B-COMPLEX PO) Take 50 mg by mouth        CALCIUM-MAGNESIUM-VITAMIN D PO Take 2 tablets by mouth daily       Cholecalciferol (VITAMIN D) 1000 UNIT capsule Take 2,000 Units by mouth daily        DiphenhydrAMINE HCl (BENADRYL PO) Take 50 mg by mouth daily as needed (monthly)       EPINEPHrine (EPIPEN/ADRENACLICK/OR ANY BX GENERIC EQUIV) 0.3 MG/0.3ML injection 2-pack Inject 0.3 mLs (0.3 mg) into the muscle once as needed for anaphylaxis (Patient not taking: Reported on 4/8/2020) 0.3 mL PRN     escitalopram (LEXAPRO) 20 MG tablet Take 1 tablet (20 mg) by mouth daily 90 tablet 3     fluticasone (FLONASE) 50 MCG/ACT nasal spray SHAKE LIQUID AND USE 2 SPRAYS IN EACH NOSTRIL DAILY 48 g 3     lisinopril (PRINIVIL/ZESTRIL) 5 MG tablet Take 1 tablet (5 mg) by mouth daily (Patient not taking: Reported on 4/8/2020) 90 tablet PRN     LORazepam (ATIVAN) 1 MG tablet Take 1 tablet (1 mg) by mouth every 6 hours as needed (nausea/vomiting, anxiety or sleep) 30 tablet 1     Magnesium Hydroxide (MAGNESIA PO) Take 500 mg by mouth       metFORMIN (GLUCOPHAGE-XR) 500 MG 24 hr tablet TAKE 2 TABLETS BY MOUTH EVERY DAY WITH THE EVENING MEAL 180 tablet PRN     Multiple Vitamins-Minerals (MULTIVITAMIN ADULT PO) Take 1 tablet by mouth daily       nitroFURantoin macrocrystal-monohydrate (MACROBID) 100 MG capsule Take one capsule after intercourse as needed (Patient not taking: Reported on 2/7/2020) 30 capsule PRN     Probiotic Product (PRO-BIOTIC  BLEND PO) Take 1 capsule by mouth daily Enzymatic Therapy-probiotic pearls       rivaroxaban ANTICOAGULANT (XARELTO) 20 MG TABS tablet Take 1 tablet (20 mg) by mouth daily (with dinner) 30 tablet 1     tamoxifen (NOLVADEX) 10 MG tablet Cut each pill in 1/2 with pill cutter to ensure 5 mg daily dose. 45 tablet 3     traZODone (DESYREL) 50 MG tablet TAKE 1 TO 2 TABLETS(50  MG) BY MOUTH EVERY NIGHT AS NEEDED FOR SLEEP 180 tablet PRN        Family History:  Family History   Problem Relation Age of Onset     C.A.D. Father      Diabetes Father         Later in his life, in his 70s     Hypertension Father      Cerebrovascular Disease Father         1984 or      Psychotic Disorder Father      Pancreatic Cancer Father      Coronary Artery Disease Father         diagnosed in his late 50s (age)     Hyperlipidemia Father         treated w statins     Other Cancer Father         pancreatic, ,  of this     Depression Father      Mental Illness Father         likely PTSD and depression     Obesity Father      C.A.D. Mother      Hypertension Mother      Breast Cancer Mother         2 times,  and ?     Psychotic Disorder Mother      Colon Cancer Mother         ?     Cancer Mother         Bone cancer     Coronary Artery Disease Mother         diagnosed in her late 70s (age)     Hyperlipidemia Mother         treated w. statins     Other Cancer Mother         bone/marrow, ,  of this     Depression Mother      Anxiety Disorder Mother      Mental Illness Mother         various undiagnosed types, but THERE     Obesity Mother      Prostate Problems Brother         cleared      Hypertension Brother      Hyperlipidemia Brother         unsure if treated w statins     Depression Brother      Anxiety Disorder Brother      Mental Illness Brother         undiagnosed but THERE     Obesity Brother      Psychotic Disorder Sister         x2     Neurologic Disorder Sister      Hypertension Sister       Hyperlipidemia Sister         treated w statins     Depression Sister      Anxiety Disorder Sister      Obesity Sister      Psychotic Disorder Brother         x2     Depression Brother         major depressive disorder and OCD     Anxiety Disorder Brother      Mental Illness Brother         not sure of diagnoses, treated     Hypertension Brother      Hyperlipidemia Brother      Psychotic Disorder Maternal Grandmother         ?     Coronary Artery Disease Maternal Grandmother          of heart attack age 84?     Depression Maternal Grandmother      Psychotic Disorder Maternal Grandfather         ?     Psychotic Disorder Paternal Grandmother         Schizophernia     Coronary Artery Disease Paternal Grandmother          of heart attack, age 85?     Depression Paternal Grandmother         severe, but recovered     Mental Illness Paternal Grandmother         possible bipolar or other     Psychotic Disorder Paternal Grandfather         ?     Respiratory Paternal Grandfather          of emphysema and smoking     Psychotic Disorder Sister      Other - See Comments Sister         small kidney stone      Hypertension Sister      Hyperlipidemia Sister         treated w statins     Depression Sister      Anxiety Disorder Sister      Cancer - colorectal No family hx of      Glaucoma No family hx of      Macular Degeneration No family hx of        Social History:  Social History     Socioeconomic History     Marital status: Single     Spouse name: Not on file     Number of children: 0     Years of education: Not on file     Highest education level: Not on file   Occupational History     Comment: [employer]   Social Needs     Financial resource strain: Not on file     Food insecurity     Worry: Not on file     Inability: Not on file     Transportation needs     Medical: Not on file     Non-medical: Not on file   Tobacco Use     Smoking status: Never Smoker     Smokeless tobacco: Never Used   Substance and  Sexual Activity     Alcohol use: Yes     Comment: Very infrequent, a bit of wine or beer 2x per month maybe     Drug use: Yes     Types: Marijuana     Comment: infrequent, for celebrations only, maybe 8x per year     Sexual activity: Yes     Partners: Male     Birth control/protection: None     Comment: long-time    Lifestyle     Physical activity     Days per week: Not on file     Minutes per session: Not on file     Stress: Not on file   Relationships     Social connections     Talks on phone: Not on file     Gets together: Not on file     Attends Jainism service: Not on file     Active member of club or organization: Not on file     Attends meetings of clubs or organizations: Not on file     Relationship status: Not on file     Intimate partner violence     Fear of current or ex partner: Not on file     Emotionally abused: Not on file     Physically abused: Not on file     Forced sexual activity: Not on file   Other Topics Concern     Parent/sibling w/ CABG, MI or angioplasty before 65F 55M? No   Social History Narrative     Not on file       Physical Exam:  LMP 01/01/2009   GENERAL: Healthy, alert and no distress  EYES: Eyes grossly normal to inspection.  No discharge or erythema, or obvious scleral/conjunctival abnormalities.  RESP: No audible wheeze, cough, or visible cyanosis.  No visible retractions or increased work of breathing.    SKIN: Visible skin clear. No significant rash, abnormal pigmentation or lesions.  NEURO: Cranial nerves grossly intact.  Mentation and speech appropriate for age.  PSYCH: Mentation appears normal, affect normal/bright, judgement and insight intact, normal speech and appearance well-groomed.      Laboratory/Imaging Studies  No results found for any visits on 10/02/20.    ASSESSMENT  We reviewed her interval history and the results of her mammogram. She has been doing well with regard to the low dose tamoxifen; I have refilled her prescription today.  Her BMP from March  does not show concern and she has not breast complaints.  At this point, she will continue quarantine during the pandemic and continues to do her best to reduce stress, eat plenty of fruits and vegetables, limit her sweets and keep up with her screenings.    INDIVIDUALIZED CANCER RISK MANAGEMENT PLAN:  Individualized Surveillance Plan for women  With 20% or greater lifetime risk of breast cancer   Per NCCN Breast Cancer Screening and Diagnosis Guidelines Version 2.2018    Recommended screening Test or procedure Last done Next Scheduled    Clinical encounter Ongoing risk assessment, risk reduction counseling and clinical breast exam, every 6-12 months. Refer to genetic counseling if not already done.   10/2/2020   October 2021   However, some family histories with breast cancers at a very young age, may warrant screening starting earlier.    *May begin at age 40 if breast cancers in the family occur at later ages.    Annual mammogram beginning 10 years younger than the earliest breast cancer in the family but not prior to age 30.    Recommend annual breast MRI to begin 10 years younger than the earliest breast cancer in the family but not prior to age 25.    Breast MRIs are preferably done on day 7-15 of the menstrual cycle in premenopausal women. 4/9/2019: Breast MRI, BI-RADS 1.    10/1/2020- Screening mammogram, BiRads    Breast MRI in April (4/10 or later)    Next exam: October 2021 followed by tomosynthesis mammogram   Breast screening for patients at high risk due to thoracic radiation before 30 years old    Begin 10 years post radiation treatment or age 25.   Annual mammogram beginning 10 years after radiation but not prior to age 30    Annual breast MRI, preferably on day 7-15 of menstrual cycle for premenopausal women.       NA     NA   Women who have a lifetime risk of >20% based on history of LCIS or ADH/ALH Annual screening mammogram beginning at age of LCIS or ADH/ALH but not prior to age 30.    Consider  annual MRI to begin at age of diagnosis of LCIS or ADH/ALH but not prior to age 25.    Consider risk reducing strategies.     NA     NA    Recommend risk reducing strategies for women with 1.7% 5 year risk of breast cancer.         I spent 20 minutes with the patient with greater that 50% of it in counseling and coordinating care as documented above.    MARLON Chiu-CNS, OCN, AGN-BC  Clinical Nurse Specialist  Cancer Risk Management Program  [facility] [address]  [place] [address]    phone:  [phone]  Pager: [phone]  fax: [phone]    CC: LEIA ReillyNP

## 2020-10-01 NOTE — NURSING NOTE
Chief Complaint   Patient presents with     Port Draw     Labs drawn via PORT by RN in lab. VS taken.      Irma Alfaro RN     She's anemic and she has blood in her stool.

## 2020-10-02 ENCOUNTER — VIRTUAL VISIT (OUTPATIENT)
Dept: ONCOLOGY | Facility: CLINIC | Age: 65
End: 2020-10-02
Attending: CLINICAL NURSE SPECIALIST
Payer: COMMERCIAL

## 2020-10-02 DIAGNOSIS — Z91.89 AT HIGH RISK FOR BREAST CANCER: Primary | ICD-10-CM

## 2020-10-02 PROCEDURE — 99213 OFFICE O/P EST LOW 20 MIN: CPT | Mod: GT | Performed by: CLINICAL NURSE SPECIALIST

## 2020-10-02 RX ORDER — TAMOXIFEN CITRATE 10 MG/1
5 TABLET ORAL DAILY
Qty: 45 TABLET | Refills: 1 | Status: SHIPPED | OUTPATIENT
Start: 2020-10-02 | End: 2021-08-04

## 2020-10-02 NOTE — LETTER
"    Cancer Risk Management  Program [facility]  [facility]  [facility]  [facility]  Mailing Address  Cancer Risk Management Program  [facility]  [address] [place] [address]    New patient appointments  [phone]  October 2, 2020    Hafsa Corona  [address]  [place] [address]      Dear Hafsa,    It was a pleasure meeting with you today.  Below is a copy of my note from our visit, outlining your surveillance plan.      I look forward to seeing you in the future to coordinate your care and reduce your health risks. Please feel free to contact me if you have any questions or concerns.      Hafsa Corona is a 65 year old female who is being evaluated via a billable video visit.      The patient has been notified of following:     \"This video visit will be conducted via a call between you and your physician/provider. We have found that certain health care needs can be provided without the need for an in-person physical exam.  This service lets us provide the care you need with a video conversation.  If a prescription is necessary we can send it directly to your pharmacy.  If lab work is needed we can place an order for that and you can then stop by our lab to have the test done at a later time.    Video visits are billed at different rates depending on your insurance coverage.  Please reach out to your insurance provider with any questions.    If during the course of the call the physician/provider feels a video visit is not appropriate, you will not be charged for this service.\"    Patient has given verbal consent for Video visit? Yes  How would you like to obtain your AVS? MyChart  If you are dropped from the video visit, the video invite should be resent to: Text to cell phone: [phone]  Will anyone else " be joining your video visit? No        Video-Visit Details    Type of service:  Video Visit    Video Start Time: 1045  Video End Time: 1105    Originating Location (pt. Location): Home    Distant Location (provider location):  [facility]     Platform used for Video Visit: [employer]      Oncology Risk Management Consultation:  Date on this visit: 10/2/2020    Hafsa Corona is being seen via a billable video visit at the Cancer Risk Management Program.  She requires heightened screening and surveillance for her higher risk of breast cancer, secondary to a family history of breast cancer in her mother at age 63.       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     She is considered to at high risk for breast cancer and has a 30.3% lifetime risk for breast cancer by the SHERON model. She has a 10.8% 5 year risk by the SHERON model.     Primary Physician:  MARLON Reilly-CNP    History Of Present Illness:  Ms. Corona is a very pleasant  65 year old female who presents with family history of breast cancer.     Genetic testin2017 - Negative for mutations in the AIP, ALK, APC, COSTA, BAP1, BARD1, BLM, BRCA1, BRCA2, BRIP1, BMPR1A, CDH1, CDK4, CDKN1B, CDKN2A, CHEK2, DICER1, EPCAM, FANCC, FH, FLCN, GALNT12, GREM1, HOXB13, MAX, MEN1, MET, MITF, MLH1, MRE11A, MSH2, MSH6, MUTYH, NBN, NF1, NF2, PALB2, PHOX2B, PMS2, POLD1, POLE, POT1, TFHVY7B, PTCH1, PTEN, RAD50, RAD51C, RAD51D, RB1, RET, SDHA, SDHAF2, SDHB, SDHC, SDHD, SMAD4, SMARCA4, SMARCB1, SMARCE1, STK11, SUFU, TNXI879, TP53, TSC1, TSC2, VHL, and XRCC2 genes using a Cancer-Next Expanded panel through [email or web address].     Pertinent history:  2007- R salpingo oophorectomy for abnormal imaging (enlarged ovary), biopsy showed endometriosis  2017 - Stage 1C2 Clear cell carcinoma of the L ovary, s/p HELENE/LSO and chemotherapy.  18: R nephrectomy with Dr. 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   Nulliparous.  Menarche at 10.  Menopause: age 54  Hx of OCPs x6 years.  No hx of HRT.  Density: heterogeneously dense  No history of breast biopsy.  Hx of breast cyst in 1987, aspirated   No history of hyperplasia, atypia, or malignancy  On low dose tamoxifen since 10/10/2019 for prevention of breast cancer     Pertinent screening history:  6/2011 - Screening mammogram, normal by report   2/1/2014 - Screening mammogram, normal by report  9/15/2017 - Screening mammogram, BiRads1.    4/5/2018 - Breast MRI, BiRads2.  9/21/2018: Screening tomosynthesis mammogram, BI-RADS 1.  4/9/2019: Breast MRI, BI-RADS 1.  9/27/2019- Screening tomosynthesis mammogram, BiRads1  10/1/2020- Screening mammogram, BiRads       At this visit, she denies asymmetry, lumps, masses, thickening, pain, nipple discharge and skin changes.      Past Medical/Surgical History:  Past Medical History:   Diagnosis Date     Anxiety state, unspecified     [id number]     Arthritis 2014    vague, gradual, not treated really, knees feel it     At high risk for breast cancer 09/27/2019    30.3% lifetime risk by SHERON model     Attention deficit disorder without mention of hyperactivity      Cancer (H) 7/2017 and 1/2018    ovarian and kidney cancers, both treated well     Chronic rhinitis      Depressive disorder lifetime    plus Anxiety plus ADD. Escitalipram, therapy, ADD meds maybe     Depressive disorder, not elsewhere classified      Heart disease 1999    tachycardia and high cholesterol, managed     History of blood transfusion 7/2017    while in hospital for ovarian cancer     Hypertension 1999    tho BP was too LOW last week. past 12 years Generally OK     Panic disorder without agoraphobia      Pure hypercholesterolemia     Lipitor     Tachycardia      Tachycardia, unspecified     9/1999-2/2004     Type II or unspecified type diabetes mellitus without mention of complication, not stated as uncontrolled      diagnosed 2004     Past Surgical History:   Procedure Laterality Date     AS REMV KIDNEY,RADICAL Right      BIOPSY  7/2017 and 2/2018    ovarian and kidney cancer biopsies. also 1987 breast benign     BREAST SURGERY  1987    date unsure. benign breast cyst removed     CATARACT IOL, RT/LT Left 05/18/2018     CATARACT IOL, RT/LT Right 07/1318     COLONOSCOPY  2008 and 2013    polyps removed. Next due fall 2018     HYSTERECTOMY TOTAL ABDOMINAL, BILATERAL SALPINGO-OOPHORECTOMY, NODE DISSECTION, COMBINED Left 7/7/2017    Procedure: COMBINED HYSTERECTOMY TOTAL ABDOMINAL, SALPINGO-OOPHORECTOMY, NODE DISSECTION;  Exploratory Laparotomy, Left Salpingo-Oophorectomy, Cancer Staging, Total Hysterectomy, Omentectomy, Evacuation of abdominal fluid, Lymph Node Dissection  Anesthesia Block ;  Surgeon: Serena Cole MD;  Location: [facility] OR     HYSTERECTOMY, PAP NO LONGER INDICATED  07/07/2017    Laparotomy; for ovarian cancer staging     HYSTERECTOMY, PAP NO LONGER INDICATED       INSERT PORT VASCULAR ACCESS Right 8/21/2017    Procedure: INSERT PORT VASCULAR ACCESS;  Single Lumen Chest Power Port;  Surgeon: Stephen Mike PA-C;  Location: [facility] OR     LYMPHADENECTOMY RETROPERITONEAL Bilateral 07/07/2017    Laparotomy; pelvics & para-aortics; for ovarian cancer staging     OMENTECTOMY  07/07/2017    Laparotomy; for ovarian cancer staging     ORTHOPEDIC SURGERY  1/2002    broken left jaw due to fall, 4 fractures, titanium plates     PHACOEMULSIFICATION CLEAR CORNEA WITH STANDARD INTRAOCULAR LENS IMPLANT Right 7/13/2018    Procedure: PHACOEMULSIFICATION CLEAR CORNEA WITH STANDARD INTRAOCULAR LENS IMPLANT;  RIght Eye Phacoemulsification Clear Cornea with Standard Intraocular Lens Placement ;  Surgeon: Mary Jo Massey MD;  Location: [facility] OR     PHACOEMULSIFICATION CLEAR CORNEA WITH TORIC INTRAOCULAR LENS IMPLANT Left 5/18/2018    Procedure: PHACOEMULSIFICATION CLEAR CORNEA WITH TORIC INTRAOCULAR LENS IMPLANT;  Left Eye  Phacoemulsification with Toric Lens;  Surgeon: Mary Jo Massey MD;  Location: [facility] OR     SALPINGO OOPHORECTOMY,R/L/KAYY Right 2008    Salpingo Oophorectomy, RT     SALPINGO OOPHORECTOMY,R/L/KAYY Left 07/07/2017    Laparotomy; for ovarian cancer staging     SURGICAL HISTORY OF -       facial surgery d/t fall in 1/2002     SURGICAL HISTORY OF -       1985 removal of breast cyst     SURGICAL HISTORY OF -   2008    endometrial ablation     YAG CAPSULOTOMY OD (RIGHT EYE)  10/22/2018       Allergies:  Allergies as of 10/02/2020 - Reviewed 04/08/2020   Allergen Reaction Noted     Paclitaxel Anaphylaxis and Difficulty breathing 08/30/2017     Wasps [hornets] Anaphylaxis 09/03/2009     Yellow hornet venom [hornet venom] Anaphylaxis and Swelling 05/10/2018     Yellow jacket venom [venomil honey bee] Anaphylaxis and Swelling 05/10/2018     Sulfa drugs Hives and Rash 07/19/2005       Current Medications:  Current Outpatient Medications   Medication Sig Dispense Refill     tamoxifen (NOLVADEX) 10 MG tablet Take 0.5 tablets (5 mg) by mouth daily Cut each pill in 1/2 with pill cutter to ensure 5 mg daily dose. 45 tablet 1     Acetaminophen (TYLENOL PO) Take 500 mg by mouth 2 tabs prn pain (monthly)       atorvastatin (LIPITOR) 80 MG tablet Take 1 tablet (80 mg) by mouth daily 90 tablet PRN     B Complex Vitamins (VITAMIN B-COMPLEX PO) Take 50 mg by mouth        CALCIUM-MAGNESIUM-VITAMIN D PO Take 2 tablets by mouth daily       Cholecalciferol (VITAMIN D) 1000 UNIT capsule Take 2,000 Units by mouth daily        DiphenhydrAMINE HCl (BENADRYL PO) Take 50 mg by mouth daily as needed (monthly)       EPINEPHrine (EPIPEN/ADRENACLICK/OR ANY BX GENERIC EQUIV) 0.3 MG/0.3ML injection 2-pack Inject 0.3 mLs (0.3 mg) into the muscle once as needed for anaphylaxis (Patient not taking: Reported on 4/8/2020) 0.3 mL PRN     escitalopram (LEXAPRO) 20 MG tablet Take 1 tablet (20 mg) by mouth daily 90 tablet 3     fluticasone (FLONASE) 50 MCG/ACT nasal  spray SHAKE LIQUID AND USE 2 SPRAYS IN EACH NOSTRIL DAILY 48 g 3     lisinopril (PRINIVIL/ZESTRIL) 5 MG tablet Take 1 tablet (5 mg) by mouth daily (Patient not taking: Reported on 2020) 90 tablet PRN     LORazepam (ATIVAN) 1 MG tablet Take 1 tablet (1 mg) by mouth every 6 hours as needed (nausea/vomiting, anxiety or sleep) 30 tablet 1     Magnesium Hydroxide (MAGNESIA PO) Take 500 mg by mouth       metFORMIN (GLUCOPHAGE-XR) 500 MG 24 hr tablet TAKE 2 TABLETS BY MOUTH EVERY DAY WITH THE EVENING MEAL 180 tablet PRN     Multiple Vitamins-Minerals (MULTIVITAMIN ADULT PO) Take 1 tablet by mouth daily       nitroFURantoin macrocrystal-monohydrate (MACROBID) 100 MG capsule Take one capsule after intercourse as needed (Patient not taking: Reported on 2020) 30 capsule PRN     Probiotic Product (PRO-BIOTIC BLEND PO) Take 1 capsule by mouth daily Enzymatic Therapy-probiotic pearls       rivaroxaban ANTICOAGULANT (XARELTO) 20 MG TABS tablet Take 1 tablet (20 mg) by mouth daily (with dinner) 30 tablet 1     tamoxifen (NOLVADEX) 10 MG tablet Cut each pill in 1/2 with pill cutter to ensure 5 mg daily dose. 45 tablet 3     traZODone (DESYREL) 50 MG tablet TAKE 1 TO 2 TABLETS(50  MG) BY MOUTH EVERY NIGHT AS NEEDED FOR SLEEP 180 tablet PRN        Family History:  Family History   Problem Relation Age of Onset     C.A.D. Father      Diabetes Father         Later in his life, in his 70s     Hypertension Father      Cerebrovascular Disease Father         1984 or      Psychotic Disorder Father      Pancreatic Cancer Father      Coronary Artery Disease Father         diagnosed in his late 50s (age)     Hyperlipidemia Father         treated w statins     Other Cancer Father         pancreatic, ,  of this     Depression Father      Mental Illness Father         likely PTSD and depression     Obesity Father      C.A.D. Mother      Hypertension Mother      Breast Cancer Mother         2 times,  and ?      Psychotic Disorder Mother      Colon Cancer Mother         ?     Cancer Mother         Bone cancer     Coronary Artery Disease Mother         diagnosed in her late 70s (age)     Hyperlipidemia Mother         treated w. statins     Other Cancer Mother         bone/marrow, ,  of this     Depression Mother      Anxiety Disorder Mother      Mental Illness Mother         various undiagnosed types, but THERE     Obesity Mother      Prostate Problems Brother         cleared      Hypertension Brother      Hyperlipidemia Brother         unsure if treated w statins     Depression Brother      Anxiety Disorder Brother      Mental Illness Brother         undiagnosed but THERE     Obesity Brother      Psychotic Disorder Sister         x2     Neurologic Disorder Sister      Hypertension Sister      Hyperlipidemia Sister         treated w statins     Depression Sister      Anxiety Disorder Sister      Obesity Sister      Psychotic Disorder Brother         x2     Depression Brother         major depressive disorder and OCD     Anxiety Disorder Brother      Mental Illness Brother         not sure of diagnoses, treated     Hypertension Brother      Hyperlipidemia Brother      Psychotic Disorder Maternal Grandmother         ?     Coronary Artery Disease Maternal Grandmother          of heart attack age 84?     Depression Maternal Grandmother      Psychotic Disorder Maternal Grandfather         ?     Psychotic Disorder Paternal Grandmother         Schizophernia     Coronary Artery Disease Paternal Grandmother          of heart attack, age 85?     Depression Paternal Grandmother         severe, but recovered     Mental Illness Paternal Grandmother         possible bipolar or other     Psychotic Disorder Paternal Grandfather         ?     Respiratory Paternal Grandfather          of emphysema and smoking     Psychotic Disorder Sister      Other - See Comments Sister         small kidney stone      Hypertension  Sister      Hyperlipidemia Sister         treated w statins     Depression Sister      Anxiety Disorder Sister      Cancer - colorectal No family hx of      Glaucoma No family hx of      Macular Degeneration No family hx of        Social History:  Social History     Socioeconomic History     Marital status: Single     Spouse name: Not on file     Number of children: 0     Years of education: Not on file     Highest education level: Not on file   Occupational History     Comment: [employer]   Social Needs     Financial resource strain: Not on file     Food insecurity     Worry: Not on file     Inability: Not on file     Transportation needs     Medical: Not on file     Non-medical: Not on file   Tobacco Use     Smoking status: Never Smoker     Smokeless tobacco: Never Used   Substance and Sexual Activity     Alcohol use: Yes     Comment: Very infrequent, a bit of wine or beer 2x per month maybe     Drug use: Yes     Types: Marijuana     Comment: infrequent, for celebrations only, maybe 8x per year     Sexual activity: Yes     Partners: Male     Birth control/protection: None     Comment: long-time    Lifestyle     Physical activity     Days per week: Not on file     Minutes per session: Not on file     Stress: Not on file   Relationships     Social connections     Talks on phone: Not on file     Gets together: Not on file     Attends [employer]ze service: Not on file     Active member of club or organization: Not on file     Attends meetings of clubs or organizations: Not on file     Relationship status: Not on file     Intimate partner violence     Fear of current or ex partner: Not on file     Emotionally abused: Not on file     Physically abused: Not on file     Forced sexual activity: Not on file   Other Topics Concern     Parent/sibling w/ CABG, MI or angioplasty before 65F 55M? No   Social History Narrative     Not on file       Physical Exam:  LMP 01/01/2009   GENERAL: Healthy, alert and no  distress  EYES: Eyes grossly normal to inspection.  No discharge or erythema, or obvious scleral/conjunctival abnormalities.  RESP: No audible wheeze, cough, or visible cyanosis.  No visible retractions or increased work of breathing.    SKIN: Visible skin clear. No significant rash, abnormal pigmentation or lesions.  NEURO: Cranial nerves grossly intact.  Mentation and speech appropriate for age.  PSYCH: Mentation appears normal, affect normal/bright, judgement and insight intact, normal speech and appearance well-groomed.      Laboratory/Imaging Studies  No results found for any visits on 10/02/20.    ASSESSMENT  We reviewed her interval history and the results of her mammogram. She has been doing well with regard to the low dose tamoxifen; I have refilled her prescription today.  Her BMP from March does not show concern and she has not breast complaints.  At this point, she will continue quarantine during the pandemic and continues to do her best to reduce stress, eat plenty of fruits and vegetables, limit her sweets and keep up with her screenings.    INDIVIDUALIZED CANCER RISK MANAGEMENT PLAN:  Individualized Surveillance Plan for women  With 20% or greater lifetime risk of breast cancer   Per NCCN Breast Cancer Screening and Diagnosis Guidelines Version 2.2018    Recommended screening Test or procedure Last done Next Scheduled    Clinical encounter Ongoing risk assessment, risk reduction counseling and clinical breast exam, every 6-12 months. Refer to genetic counseling if not already done.   10/2/2020   October 2021   However, some family histories with breast cancers at a very young age, may warrant screening starting earlier.    *May begin at age 40 if breast cancers in the family occur at later ages.    Annual mammogram beginning 10 years younger than the earliest breast cancer in the family but not prior to age 30.    Recommend annual breast MRI to begin 10 years younger than the earliest breast cancer in  the family but not prior to age 25.    Breast MRIs are preferably done on day 7-15 of the menstrual cycle in premenopausal women. 4/9/2019: Breast MRI, BI-RADS 1.    10/1/2020- Screening mammogram, BiRads    Breast MRI in April (4/10 or later)    Next exam: October 2021 followed by tomosynthesis mammogram   Breast screening for patients at high risk due to thoracic radiation before 30 years old    Begin 10 years post radiation treatment or age 25.   Annual mammogram beginning 10 years after radiation but not prior to age 30    Annual breast MRI, preferably on day 7-15 of menstrual cycle for premenopausal women.       NA     NA   Women who have a lifetime risk of >20% based on history of LCIS or ADH/ALH Annual screening mammogram beginning at age of LCIS or ADH/ALH but not prior to age 30.    Consider annual MRI to begin at age of diagnosis of LCIS or ADH/ALH but not prior to age 25.    Consider risk reducing strategies.     NA     NA    Recommend risk reducing strategies for women with 1.7% 5 year risk of breast cancer.         I spent 20 minutes with the patient with greater that 50% of it in counseling and coordinating care as documented above.    MARLON Chiu-CNS, OCN, AGN-BC  Clinical Nurse Specialist  Cancer Risk Management Program  [facility] [address]  [place] [address]    phone:  [phone]  Pager: [phone]  fax: [phone]    CC: LEIA ReillyNP

## 2020-10-06 ASSESSMENT — ENCOUNTER SYMPTOMS
FEVER: 0
ALTERED TEMPERATURE REGULATION: 0
BACK PAIN: 1
STIFFNESS: 0
HYPERTENSION: 0
FATIGUE: 1
HEMATURIA: 0
MUSCLE CRAMPS: 1
DIFFICULTY URINATING: 0
SORE THROAT: 0
SINUS PAIN: 1
MYALGIAS: 1
TROUBLE SWALLOWING: 0
MUSCLE WEAKNESS: 0
POLYDIPSIA: 0
WEIGHT GAIN: 1
SYNCOPE: 0
DECREASED APPETITE: 0
SMELL DISTURBANCE: 0
NIGHT SWEATS: 0
HYPOTENSION: 0
LIGHT-HEADEDNESS: 1
LEG PAIN: 1
HALLUCINATIONS: 0
EXERCISE INTOLERANCE: 0
POLYPHAGIA: 0
ORTHOPNEA: 0
TASTE DISTURBANCE: 0
DYSURIA: 0
FLANK PAIN: 0
SINUS CONGESTION: 1
NECK PAIN: 0
INCREASED ENERGY: 0
HOARSE VOICE: 0
JOINT SWELLING: 0
PALPITATIONS: 1
CHILLS: 0
SLEEP DISTURBANCES DUE TO BREATHING: 0
NECK MASS: 0
ARTHRALGIAS: 1
WEIGHT LOSS: 0

## 2020-10-07 ENCOUNTER — ONCOLOGY VISIT (OUTPATIENT)
Dept: ONCOLOGY | Facility: CLINIC | Age: 65
End: 2020-10-07
Attending: OBSTETRICS & GYNECOLOGY
Payer: COMMERCIAL

## 2020-10-07 VITALS
BODY MASS INDEX: 37.91 KG/M2 | TEMPERATURE: 98.2 F | HEART RATE: 97 BPM | DIASTOLIC BLOOD PRESSURE: 74 MMHG | SYSTOLIC BLOOD PRESSURE: 106 MMHG | RESPIRATION RATE: 16 BRPM | OXYGEN SATURATION: 96 % | WEIGHT: 214 LBS

## 2020-10-07 DIAGNOSIS — N39.46 MIXED STRESS AND URGE URINARY INCONTINENCE: ICD-10-CM

## 2020-10-07 DIAGNOSIS — C56.9 MALIGNANT NEOPLASM OF OVARY, UNSPECIFIED LATERALITY (H): Primary | ICD-10-CM

## 2020-10-07 DIAGNOSIS — D17.71 ANGIOMYOLIPOMA OF KIDNEY: ICD-10-CM

## 2020-10-07 PROCEDURE — 99214 OFFICE O/P EST MOD 30 MIN: CPT | Performed by: OBSTETRICS & GYNECOLOGY

## 2020-10-07 PROCEDURE — G0463 HOSPITAL OUTPT CLINIC VISIT: HCPCS

## 2020-10-07 ASSESSMENT — ENCOUNTER SYMPTOMS
COUGH DISTURBING SLEEP: 0
CHILLS: 0
ABDOMINAL PAIN: 0
LEG PAIN: 1
DYSPNEA ON EXERTION: 0
SWOLLEN GLANDS: 0
DYSURIA: 0
BLOOD IN STOOL: 0
SYNCOPE: 0
TINGLING: 0
EYE REDNESS: 0
HYPOTENSION: 0
HOARSE VOICE: 0
NAIL CHANGES: 0
RECTAL PAIN: 0
NUMBNESS: 0
NECK PAIN: 0
JAUNDICE: 0
DECREASED APPETITE: 0
POLYDIPSIA: 0
BACK PAIN: 1
TROUBLE SWALLOWING: 0
FEVER: 0
WEIGHT GAIN: 1
SKIN CHANGES: 0
HEARTBURN: 0
RECTAL BLEEDING: 0
HYPERTENSION: 0
POLYPHAGIA: 0
PALPITATIONS: 1
HEMOPTYSIS: 0
MYALGIAS: 1
EXTREMITY NUMBNESS: 0
POOR WOUND HEALING: 0
EYE WATERING: 0
NAUSEA: 0
ALTERED TEMPERATURE REGULATION: 0
HALLUCINATIONS: 0
NERVOUS/ANXIOUS: 0
CONSTIPATION: 0
SHORTNESS OF BREATH: 0
DIZZINESS: 0
ARTHRALGIAS: 1
INSOMNIA: 0
POSTURAL DYSPNEA: 0
ORTHOPNEA: 0
LOSS OF CONSCIOUSNESS: 0
EXERCISE INTOLERANCE: 0
SPEECH CHANGE: 0
LIGHT-HEADEDNESS: 1
SMELL DISTURBANCE: 0
SPUTUM PRODUCTION: 0
DECREASED LIBIDO: 0
BOWEL INCONTINENCE: 0
DIFFICULTY URINATING: 0
PANIC: 0
STIFFNESS: 0
HEMATURIA: 0
MUSCLE WEAKNESS: 0
SORE THROAT: 0
COUGH: 0
SINUS PAIN: 1
WEAKNESS: 0
BREAST MASS: 0
SNORES LOUDLY: 0
DIARRHEA: 0
VOMITING: 0
NECK MASS: 0
DEPRESSION: 0
SLEEP DISTURBANCES DUE TO BREATHING: 0
PARALYSIS: 0
MUSCLE CRAMPS: 1
HEADACHES: 0
DECREASED CONCENTRATION: 0
TASTE DISTURBANCE: 0
SEIZURES: 0
DISTURBANCES IN COORDINATION: 0
BLOATING: 0
BREAST PAIN: 0
TREMORS: 0
SINUS CONGESTION: 1
WEIGHT LOSS: 0
NIGHT SWEATS: 0
BRUISES/BLEEDS EASILY: 0
JOINT SWELLING: 0
RESPIRATORY PAIN: 0
HOT FLASHES: 0
EYE IRRITATION: 0
EYE PAIN: 0
DOUBLE VISION: 0
MEMORY LOSS: 0
FATIGUE: 1
FLANK PAIN: 0
INCREASED ENERGY: 0
WHEEZING: 0

## 2020-10-07 ASSESSMENT — PAIN SCALES - GENERAL: PAINLEVEL: NO PAIN (0)

## 2020-10-07 NOTE — PROGRESS NOTES
"Oncology Rooming Note    October 7, 2020 11:28 AM   Hafsa Corona is a 65 year old female who presents for:    Chief Complaint   Patient presents with     Oncology Clinic Visit     Return;  Ovarian Ca.     Initial Vitals: Pulse 97   Resp 16   Wt 97.1 kg (214 lb)   LMP 01/01/2009   SpO2 96%   BMI 37.91 kg/m   Estimated body mass index is 37.91 kg/m  as calculated from the following:    Height as of 2/7/20: 1.6 m (5' 3\").    Weight as of this encounter: 97.1 kg (214 lb). Body surface area is 2.08 meters squared.  No Pain (0) Comment: Data Unavailable   Patient's last menstrual period was 01/01/2009.  Allergies reviewed: Yes  Medications reviewed: Yes    Medications: Medication refills not needed today.  Pharmacy [facility] entered into [id number] - [place], [place] - [address] AT [facility] [address]    Clinical concerns: Patient denies pelvic and vaginal pain at today's visit.         Rebecca Fiore CMA              "

## 2020-10-07 NOTE — LETTER
10/7/2020         RE: Hafsa Corona  [address]  [place] [address]        Dear Colleague,    Thank you for referring your patient, Hafsa Corona, to the [facility]. Please see a copy of my visit note below.                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 She completed treatment with surgery and three cycles of chemotherapy on 10/16/17. She is here today for a surveillance visit.     Oncology History:  The patient has had a recent episode of lower abdominal pain in early July.  She was subsequently sent to the emergency room and was found to have a large 9 cm complex ovarian mass. She was admitted to the hospital for pain control.  7/3/17:  1187  17: Exploratory laparotomy, total abdominal hysterectomy, left salpingo-oophorectomy, cancer staging including infracolic omentectomy, bilateral pelvic & para-aortic lymphadenectomy, peritoneal biopsies, evacuation of pelvic fluid by Dr. Cole.    FINAL DIAGNOSIS:   A. LEFT OVARY AND FALLOPIAN TUBE, LEFT SALPINGO-OOPHORECTOMY:   - Ovarian clear cell carcinoma   - Background of endometriosis   - Fallopian tube with no significant histologic abnormality.   B. UTERUS, HYSTERECTOMY:   -  Inactive endometrium   -  Myometrium with adenomyosis and leiomyoma.   -  Cervix with squamous metaplasia.   C. PERITONEUM, RIGHT PELVIS, BIOPSY:   - Fibroadipose tissue, negative for malignancy.   D. LYMPH NODES, RIGHT PELVIC, DISSECTION:   - Thirteen benign lymph nodes (0/13).   E. LYMPH NODES, LEFT PELVIC, DISSECTION:   - Seven benign lymph nodes (0/7).   F. PERITONEUM, LEFT PELVIS, BIOPSY:   - Fibroadipose tissue, negative for malignancy.   G. PERITONEUM, BLADDER, BIOPSY:   - Fibroadipose tissue  with acute and chronic inflammation, negative for  malignancy.   H. PERITONEUM, POSTERIOR CUL-DE-SAC, BIOPSY:   - Fibroadipose tissue, negative for malignancy.   I. PERITONEUM, LEFT PARACOLIC GUTTER, BIOPSY:   - Fibroadipose tissue, negative for malignancy.   J. LYMPH NODES, LEFT PARA-AORTIC, DISSECTION:   - Five benign lymph nodes (0/5).   K. LYMPH NODES, RIGHT PARA-AORTIC, DISSECTION:   - Two benign lymph nodes (0/2).   L. PERITONEUM, RIGHT PARACOLIC GUTTER, BIOPSY:   - Fibroadipose tissue, negative for malignancy.   M. OMENTUM, OMENTECTOMY:   - Adipose tissue with reactive changes, negative for malignancy.   COMMENT:   The final diagnosis confirms the interpretation provided intraoperatively.   Report Name: Ovary or Fallopian Tube   Status: Submitted   Part(s) Involved:   A: Ovary and fallopian tube, left   Synoptic Report:   CLINICAL   Clinical History:   - Pelvic mass   SPECIMEN   Procedure:   - Left salpingo-oophorectomy   - Hysterectomy   - Omentectomy   - Peritoneal  biopsies   Lymph Node Sampling:   - Performed   Location:   - Pelvic lymph nodes   - Para-aortic lymph nodes   Specimen Integrity:   - Left ovary   Specimen Integrity of Left Ovary:   - Capsule intact   TUMOR   Primary Tumor Site(s):   - Left ovary   Left Ovary   Tumor Size of Left Ovary: 19 cm   Histologic Type:   - Clear cell carcinoma   Tumor Extent   Ovarian Surface Involvement:   - Absent   Specimen(s)   Extent of Left Ovary:   - Involved   Extent of Left Fallopian Tube:   - Not involved   Extent of Omentum:   - Not involved   Extent of Uterus:   - Not involved   Extent of Peritoneum:   - Not involved   Peritoneal Ascitic Fluid:   - Not performed / unknown   Pleural Fluid:   - Not performed / unknown   LYMPH NODES     Number of Pelvic Lymph Nodes Examined: 20     Number of Pelvic Lymph Nodes Involved: None identified     Number of Para-aortic Lymph Nodes Examined: 7     Number of Para-Aortic Lymph Nodes Involved: None identified   STAGE (PTNM, AJCC 7TH ED.)   Primary Tumor (pT):   - Ovary    Ovarian Primary Tumor (pT):   - pT1a: Tumor limited to 1 ovary; capsule intact, no tumor on ovarian surface. No malignant cells in ascites or peritoneal washings#   Regional Lymph Nodes (pN):   - pN0: No regional lymph node metastasis       07/31/17: Dr Shaffer follow up: Discussed with the patient that given the high risk histology, as well as ruptured mass before surgery, she would be qualified at higher risk of recurrence despite early stage ovarian cancer.  Recommended adjuvant chemotherapy. Plan for carboplatin of AUC of 6 and paclitaxel of 175, m2 for 3 cycles. Referral for genetic counselor and will see her back after those 3 cycles  08/03/17: Call from patient stating she is going for a second opinion and would like chemotherapy rescheduled to week of 8/14/17.      08/16/17: Cycle #1 Carbo/Taxol.  73. Significant paclitaxel reaction.  08/30/17: C1 carboplatin/inpatient paclitaxel desensitization.   09/25/17: C2 carboplatin/paclitaxel- inpatient paclitaxel desensitization.  15.  10/16/17: C3 carboplatin/paclitaxel- switched to docetaxel given her neuropathy.  10.  11/14/17:  8. CT CAP:  IMPRESSION:   1. Post surgical changes of hysterectomy and bilateral  salpingo-oophorectomy. Nodular soft tissue density in the posterior  cul-de-sac along with ill-defined nodular thickening in the left  pelvis, suspicious for residual disease or metastatic deposits.   2. There is a 3 cm mass in the superior pole of the right kidney,  possibly extending into the renal hilum. Represents RCC until proven  otherwise. Consider renal mass protocol CT to assess renal vein  involvement.  3. Stable sub-4 mm pulmonary nodules, continued attention on  follow-up.     11/16/17: CT renal mass protocol  IMPRESSION:  1. Arterially enhancing mass measuring 3.6 x 2.1 x 2.2 cm mass arising  from superior posterior of the right kidney, this is renal cell  carcinoma until proven otherwise.  2. Stable fat-containing  umbilical hernia.     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 Margins negative. Three month surveillance plan with [facility].     2/26/18:  14     5/22/18:  11      8/23/18:  11     12/12/18:  10     3/8/19:  12     6/19/19:  13     9/23/19:  12    11/8/19: CT Chest:   IMPRESSION:   1. Stable, sub-4 mm pulmonary nodules as above.  2. Stable hypodense, subcentimeter nodules in the thyroid.     3/27/20:  14    10/1/20:  15    HPI:   Today Hafsa comes to the clinic feeling well. She denies any gynecological concerns. She takes nitrofurantoin as post-coital for UTI prophylaxis. She is on Xarelto daily for hx of DVT. She still has her port in place, having this flushed infrequently- does not want it out yet due to fear of recurrence and the possible need for the port in the future. She is sexually active. She walks her dog daily for ten minutes for activity. In September she felt flu like with fever and lost some weight. She had a COVID test that was negative. She has since gained back the weight and is trying to eat better and incorporate more exercise. She denies any vaginal bleeding, no changes in her bowel or bladder habits, no nausea/emesis, and no difficulties eating or sleeping. She denies any abdominal discomfort/bloating, no fevers or chills, and no chest pain or shortness of breath. She up to date on breast screening (high risk screening through [facility]). She is due to see nephrology and would like to change over to the [facility] for this. She was going to [facility]. She is having bladder incontinence with standing and sneezing. She has not been doing kegels. She wants to be referred to pelvic floor physical therapy. She is up to date on her colonoscopy (1/6/20). She is due to see her PCP.     Review of Systems     Constitutional:  Positive for weight gain, fatigue and height loss. Negative for fever, chills, weight  loss, decreased appetite, night sweats, recent stressors, post-operative complications, incisional pain, hallucinations, increased energy, hyperactivity and confused.   HENT:  Positive for nosebleeds, sinus pain and sinus congestion. Negative for ear pain, hearing loss, tinnitus, trouble swallowing, hoarse voice, mouth sores, sore throat, ear discharge, tooth pain, gum tenderness, taste disturbance, smell disturbance, hearing aid, bleeding gums, dry mouth and neck mass.    Eyes:  Negative for double vision, pain, redness, eye pain, decreased vision, eye watering, eye bulging, eye dryness, flashing lights, spots, floaters, strabismus, tunnel vision, jaundice and eye irritation.   Respiratory:   Negative for cough, hemoptysis, sputum production, shortness of breath, wheezing, sleep disturbances due to breathing, snores loudly, respiratory pain, dyspnea on exertion, cough disturbing sleep and postural dyspnea.    Cardiovascular:  Positive for palpitations, leg pain and light-headedness. Negative for chest pain, dyspnea on exertion, orthopnea, fingers/toes turn blue, hypertension, hypotension, syncope, history of heart murmur, pacemaker, few scattered varicosities, sleep disturbances due to breathing, exercise intolerance and edema.   Gastrointestinal:  Negative for heartburn, nausea, vomiting, abdominal pain, diarrhea, constipation, blood in stool, melena, rectal pain, bloating, hemorrhoids, bowel incontinence, jaundice, rectal bleeding, coffee ground emesis and change in stool.   Genitourinary:  Positive for bladder incontinence. Negative for dysuria, urgency, hematuria, flank pain, vaginal discharge, difficulty urinating, genital sores, dyspareunia, decreased libido, nocturia, voiding less frequently, arousal difficulty, abnormal vaginal bleeding, excessive menstruation, menstrual changes, hot flashes, vaginal dryness and postmenopausal bleeding.   Musculoskeletal:  Positive for myalgias, back pain, arthralgias and  muscle cramps. Negative for joint swelling, stiffness, neck pain, bone pain, muscle weakness and fracture.   Skin:  Negative for nail changes, itching, poor wound healing, rash, hair changes, skin changes, acne, warts, poor wound healing, scarring, flaky skin, Raynaud's phenomenon, sensitivity to sunlight and skin thickening.   Neurological:  Positive for light-headedness. Negative for dizziness, tingling, tremors, speech change, seizures, loss of consciousness, weakness, numbness, headaches, disturbances in coordination, extremity numbness, memory loss, difficulty walking and paralysis.   Endo/Heme:  Negative for anemia, swollen glands and bruises/bleeds easily.   Psychiatric/Behavioral:  Negative for depression, hallucinations, memory loss, decreased concentration, mood swings and panic attacks.    Breast:  Negative for breast discharge, breast mass, breast pain and nipple retraction.   Endocrine:  Negative for altered temperature regulation, polyphagia, polydipsia, unwanted hair growth and change in facial hair.      Past Medical History:    Past Medical History:   Diagnosis Date     Anxiety state, unspecified     [id number]     Arthritis 2014    vague, gradual, not treated really, knees feel it     At high risk for breast cancer 09/27/2019    30.3% lifetime risk by SHERON model     Attention deficit disorder without mention of hyperactivity      Cancer (H) 7/2017 and 1/2018    ovarian and kidney cancers, both treated well     Chronic rhinitis      Depressive disorder lifetime    plus Anxiety plus ADD. Escitalipram, therapy, ADD meds maybe     Depressive disorder, not elsewhere classified      Heart disease 1999    tachycardia and high cholesterol, managed     History of blood transfusion 7/2017    while in hospital for ovarian cancer     Hypertension 1999    tho BP was too LOW last week. past 12 years Generally OK     Panic disorder without agoraphobia      Pure hypercholesterolemia     Lipitor     Tachycardia       Tachycardia, unspecified     9/1999-2/2004     Type II or unspecified type diabetes mellitus without mention of complication, not stated as uncontrolled     diagnosed 2004         Past Surgical History:    Past Surgical History:   Procedure Laterality Date     AS REMV KIDNEY,RADICAL Right      BIOPSY  7/2017 and 2/2018    ovarian and kidney cancer biopsies. also 1987 breast benign     BREAST SURGERY  1987    date unsure. benign breast cyst removed     CATARACT IOL, RT/LT Left 05/18/2018     CATARACT IOL, RT/LT Right 07/1318     COLONOSCOPY  2008 and 2013    polyps removed. Next due fall 2018     HYSTERECTOMY TOTAL ABDOMINAL, BILATERAL SALPINGO-OOPHORECTOMY, NODE DISSECTION, COMBINED Left 7/7/2017    Procedure: COMBINED HYSTERECTOMY TOTAL ABDOMINAL, SALPINGO-OOPHORECTOMY, NODE DISSECTION;  Exploratory Laparotomy, Left Salpingo-Oophorectomy, Cancer Staging, Total Hysterectomy, Omentectomy, Evacuation of abdominal fluid, Lymph Node Dissection  Anesthesia Block ;  Surgeon: Serena Cole MD;  Location: [facility] OR     HYSTERECTOMY, PAP NO LONGER INDICATED  07/07/2017    Laparotomy; for ovarian cancer staging     HYSTERECTOMY, PAP NO LONGER INDICATED       INSERT PORT VASCULAR ACCESS Right 8/21/2017    Procedure: INSERT PORT VASCULAR ACCESS;  Single Lumen Chest Power Port;  Surgeon: Stephen Mike PA-C;  Location: [facility] OR     LYMPHADENECTOMY RETROPERITONEAL Bilateral 07/07/2017    Laparotomy; pelvics & para-aortics; for ovarian cancer staging     OMENTECTOMY  07/07/2017    Laparotomy; for ovarian cancer staging     ORTHOPEDIC SURGERY  1/2002    broken left jaw due to fall, 4 fractures, titanium plates     PHACOEMULSIFICATION CLEAR CORNEA WITH STANDARD INTRAOCULAR LENS IMPLANT Right 7/13/2018    Procedure: PHACOEMULSIFICATION CLEAR CORNEA WITH STANDARD INTRAOCULAR LENS IMPLANT;  RIght Eye Phacoemulsification Clear Cornea with Standard Intraocular Lens Placement ;  Surgeon: Mary Jo Massey MD;  Location: [facility] OR      PHACOEMULSIFICATION CLEAR CORNEA WITH TORIC INTRAOCULAR LENS IMPLANT Left 5/18/2018    Procedure: PHACOEMULSIFICATION CLEAR CORNEA WITH TORIC INTRAOCULAR LENS IMPLANT;  Left Eye Phacoemulsification with Toric Lens;  Surgeon: Mary Jo Massey MD;  Location: [facility] OR     SALPINGO OOPHORECTOMY,R/L/KAYY Right 2008    Salpingo Oophorectomy, RT     SALPINGO OOPHORECTOMY,R/L/KAYY Left 07/07/2017    Laparotomy; for ovarian cancer staging     SURGICAL HISTORY OF -       facial surgery d/t fall in 1/2002     SURGICAL HISTORY OF -       1985 removal of breast cyst     SURGICAL HISTORY OF -   2008    endometrial ablation     YAG CAPSULOTOMY OD (RIGHT EYE)  10/22/2018         Health Maintenance Due   Topic Date Due     DEPRESSION ACTION PLAN  1955     HEPATITIS B IMMUNIZATION (1 of 3 - Risk 3-dose series) 03/09/1973     ZOSTER IMMUNIZATION (1 of 2) 03/09/2004     Pneumococcal Vaccine: Pediatrics (0 to 5 Years) and At-Risk Patients (6 to 64 Years) (2 of 3 - PCV13) 11/19/2014     PHQ-9  03/06/2017     MEDICARE ANNUAL WELLNESS VISIT  03/09/2019     Pneumococcal Vaccine: 65+ Years (1 of 1 - PPSV23) 03/09/2019     EYE EXAM  11/28/2019     A1C  04/01/2020     INFLUENZA VACCINE (1) 09/01/2020     FALL RISK ASSESSMENT  09/27/2020     MICROALBUMIN  10/01/2020     DIABETIC FOOT EXAM  10/01/2020       Current Medications:     Current Outpatient Medications   Medication Sig Dispense Refill     Acetaminophen (TYLENOL PO) Take 500 mg by mouth 2 tabs prn pain (monthly)       atorvastatin (LIPITOR) 80 MG tablet Take 1 tablet (80 mg) by mouth daily 90 tablet PRN     B Complex Vitamins (VITAMIN B-COMPLEX PO) Take 50 mg by mouth        CALCIUM-MAGNESIUM-VITAMIN D PO Take 2 tablets by mouth daily       Cholecalciferol (VITAMIN D) 1000 UNIT capsule Take 2,000 Units by mouth daily        DiphenhydrAMINE HCl (BENADRYL PO) Take 50 mg by mouth daily as needed (monthly)       escitalopram (LEXAPRO) 20 MG tablet Take 1 tablet (20 mg) by mouth daily  90 tablet 3     fluticasone (FLONASE) 50 MCG/ACT nasal spray SHAKE LIQUID AND USE 2 SPRAYS IN EACH NOSTRIL DAILY 48 g 3     Magnesium Hydroxide (MAGNESIA PO) Take 500 mg by mouth       metFORMIN (GLUCOPHAGE-XR) 500 MG 24 hr tablet TAKE 2 TABLETS BY MOUTH EVERY DAY WITH THE EVENING MEAL 180 tablet PRN     Multiple Vitamins-Minerals (MULTIVITAMIN ADULT PO) Take 1 tablet by mouth daily       nitroFURantoin macrocrystal-monohydrate (MACROBID) 100 MG capsule Take one capsule after intercourse as needed 30 capsule PRN     Probiotic Product (PRO-BIOTIC BLEND PO) Take 1 capsule by mouth daily Enzymatic Therapy-probiotic pearls       rivaroxaban ANTICOAGULANT (XARELTO) 20 MG TABS tablet Take 1 tablet (20 mg) by mouth daily (with dinner) 30 tablet 1     tamoxifen (NOLVADEX) 10 MG tablet Take 0.5 tablets (5 mg) by mouth daily Cut each pill in 1/2 with pill cutter to ensure 5 mg daily dose. 45 tablet 1     traZODone (DESYREL) 50 MG tablet TAKE 1 TO 2 TABLETS(50  MG) BY MOUTH EVERY NIGHT AS NEEDED FOR SLEEP 180 tablet PRN     EPINEPHrine (EPIPEN/ADRENACLICK/OR ANY BX GENERIC EQUIV) 0.3 MG/0.3ML injection 2-pack Inject 0.3 mLs (0.3 mg) into the muscle once as needed for anaphylaxis (Patient not taking: Reported on 4/8/2020) 0.3 mL PRN     lisinopril (PRINIVIL/ZESTRIL) 5 MG tablet Take 1 tablet (5 mg) by mouth daily (Patient not taking: Reported on 4/8/2020) 90 tablet PRN     LORazepam (ATIVAN) 1 MG tablet Take 1 tablet (1 mg) by mouth every 6 hours as needed (nausea/vomiting, anxiety or sleep) (Patient not taking: Reported on 10/7/2020) 30 tablet 1     tamoxifen (NOLVADEX) 10 MG tablet Cut each pill in 1/2 with pill cutter to ensure 5 mg daily dose. (Patient not taking: Reported on 10/7/2020) 45 tablet 3         Allergies:        Allergies   Allergen Reactions     Paclitaxel Anaphylaxis and Difficulty breathing     Wasps [Hornets] Anaphylaxis     Yellow Hornet Venom [Hornet Venom] Anaphylaxis and Swelling     Yellow Jacket  Venom [Venomil Honey Bee] Anaphylaxis and Swelling     Sulfa Drugs Hives and Rash        Social History:     Social History     Tobacco Use     Smoking status: Never Smoker     Smokeless tobacco: Never Used   Substance Use Topics     Alcohol use: Yes     Comment: Very infrequent, a bit of wine or beer 2x per month maybe       History   Drug Use     Types: Marijuana     Comment: infrequent, for celebrations only, maybe 8x per year         Family History:     The patient's family history is notable for:     Family History   Problem Relation Age of Onset     C.A.D. Father      Diabetes Father         Later in his life, in his 70s     Hypertension Father      Cerebrovascular Disease Father         1984 or      Psychotic Disorder Father      Pancreatic Cancer Father      Coronary Artery Disease Father         diagnosed in his late 50s (age)     Hyperlipidemia Father         treated w statins     Other Cancer Father         pancreatic, ,  of this     Depression Father      Mental Illness Father         likely PTSD and depression     Obesity Father      C.A.D. Mother      Hypertension Mother      Breast Cancer Mother         2 times,  and ?     Psychotic Disorder Mother      Colon Cancer Mother         ?     Cancer Mother         Bone cancer     Coronary Artery Disease Mother         diagnosed in her late 70s (age)     Hyperlipidemia Mother         treated w. statins     Other Cancer Mother         bone/marrow, ,  of this     Depression Mother      Anxiety Disorder Mother      Mental Illness Mother         various undiagnosed types, but THERE     Obesity Mother      Prostate Problems Brother         cleared      Hypertension Brother      Hyperlipidemia Brother         unsure if treated w statins     Depression Brother      Anxiety Disorder Brother      Mental Illness Brother         undiagnosed but THERE     Obesity Brother      Psychotic Disorder Sister         x2     Neurologic  Disorder Sister      Hypertension Sister      Hyperlipidemia Sister         treated w statins     Depression Sister      Anxiety Disorder Sister      Obesity Sister      Psychotic Disorder Brother         x2     Depression Brother         major depressive disorder and OCD     Anxiety Disorder Brother      Mental Illness Brother         not sure of diagnoses, treated     Hypertension Brother      Hyperlipidemia Brother      Psychotic Disorder Maternal Grandmother         ?     Coronary Artery Disease Maternal Grandmother          of heart attack age 84?     Depression Maternal Grandmother      Psychotic Disorder Maternal Grandfather         ?     Psychotic Disorder Paternal Grandmother         Schizophernia     Coronary Artery Disease Paternal Grandmother          of heart attack, age 85?     Depression Paternal Grandmother         severe, but recovered     Mental Illness Paternal Grandmother         possible bipolar or other     Psychotic Disorder Paternal Grandfather         ?     Respiratory Paternal Grandfather          of emphysema and smoking     Psychotic Disorder Sister      Other - See Comments Sister         small kidney stone      Hypertension Sister      Hyperlipidemia Sister         treated w statins     Depression Sister      Anxiety Disorder Sister      Cancer - colorectal No family hx of      Glaucoma No family hx of      Macular Degeneration No family hx of          Physical Exam:     /74   Pulse 97   Temp 98.2  F (36.8  C) (Oral)   Resp 16   Wt 97.1 kg (214 lb)   LMP 2009   SpO2 96%   BMI 37.91 kg/m    Body mass index is 37.91 kg/m .    General Appearance: healthy and alert, no distress     HEENT: no thyromegaly, no palpable nodules or masses        Cardiovascular: regular rate and rhythm, no gallops, rubs or murmurs     Respiratory: lungs clear, no rales, rhonchi or wheezes    Musculoskeletal: extremities non tender with 1+ BL LE edema     Skin: no lesions or rashes;  small redness noted at underwear line     Neurological: normal gait, no gross defects     Psychiatric: appropriate mood and affect                               Hematological: normal cervical, supraclavicular and inguinal lymph nodes     Gastrointestinal:       abdomen soft, non-tender, non-distended, no organomegaly or masses    Genitourinary: External genitalia and urethral meatus appears normal. Vagina is smooth without nodularity or masses. Vaginal prolapse contained within vagina. Cervix appears surgically absent.  Bimanual exam reveal no masses, nodularity or fullness.  Recto-vaginal exam confirms these findings.      Assessment:    Hafsa Corona is a 65 year old woman with a diagnosis of stage IC2 clear cell carcinoma of the ovary. She completed treatment with surgery and three cycles of chemotherapy on 10/16/17. She is here today for a surveillance visit.      A total of 35 minutes was spent with the patient, over 50% minutes of which were spent in counseling the patient and/or treatment planning.      Plan:     1.)         Three years out from treatment, may continue surveillance every six months x3 years (through 10/2022) then annually thereafter with  and pelvic/rectal exam. Continue q6wk port flushes, may consider removal when she would like- discussed her concerns about recurring if she has this removed. She is aware to continue over the counter topical yeast cream on her underwear line skin redness and to try to keep her folds dry to decrease yeast. Reviewed signs and symptoms for when she should contact the clinic or seek additional care. Patient to contact the clinic with any questions or concerns in the interim.     2.) Genetic testing: Hafsa is negative for mutations in the AIP, ALK, APC, COSTA, BAP1, BARD1, BLM, BRCA1, BRCA2, BRIP1, BMPR1A, CDH1, CDK4, CDKN1B, CDKN2A, CHEK2, DICER1, EPCAM, FANCC, FH, FLCN, GALNT12, GREM1, HOXB13, MAX, MEN1, MET, MITF, MLH1, MRE11A, MSH2, MSH6, MUTYH, NBN, NF1,  NF2, PALB2, PHOX2B, PMS2, POLD1, POLE, POT1, MRGOA2K, PTCH1, PTEN, RAD50, RAD51C, RAD51D, RB1, RET, SDHA, SDHAF2, SDHB, SDHC, SDHD, SMAD4, SMARCA4, SMARCB1, SMARCE1, STK11, SUFU, LDSH086, TP53, TSC1, TSC2, VHL, and XRCC2 genes. No mutations were found in any of the 67 genes analyzed. High risk breast screening. Sees [facility]. On low dose tamoxifen since 10/10/2019 for prevention of breast cancer.     3.) Labs and/or tests ordered include:  Port flushes every 6 weeks; Ca 125 and port flush with NP visit in six months; referral for nephrology at the [facility], referral for pelvic floor physical therapy      4.) Health maintenance issues addressed today include annual health maintenance and non-gynecologic issues with PCP. She is due to see PCP and will call to schedule this appt. She will follow up with Pelvic floor PT and Nephrology here at the .     5.)        Lung nodules: stable for two years on CT considered statistically benign and no further imaging needed unless new symptoms.     6.)        Urinary incontinence: stress and urge. Will refer to pelvic floor physical therapy for this. She is aware to start incorporating kegel exercises into her daily routine. We discussed a urogynecology referral but she would like to start with pelvic floor PT first. She denies any s/s of UTI.       Iris Dalton, MSN, [facility]  Women's Health Nurse Practitioner  Division of Gynecologic Oncology        CC  Patient Care Team:  Morelia Ayon NP as PCP - Karla Perez RN as Continuity Care Coordinator (Gyn-Onc)  Nabeel Dick MD as MD (Urology)  Javier Gregorio MD as MD (Urology)  Lola Vasquez, PhD LP as Psychologist (Psychology)  Morelia Ayon NP as Assigned PCP      Oncology Rooming Note    October 7, 2020 11:28 AM   Hafsa Corona is a 65 year old female who presents for:    Chief Complaint   Patient presents with     Oncology Clinic Visit     Return;  Ovarian Ca.  "    Initial Vitals: Pulse 97   Resp 16   Wt 97.1 kg (214 lb)   LMP 01/01/2009   SpO2 96%   BMI 37.91 kg/m   Estimated body mass index is 37.91 kg/m  as calculated from the following:    Height as of 2/7/20: 1.6 m (5' 3\").    Weight as of this encounter: 97.1 kg (214 lb). Body surface area is 2.08 meters squared.  No Pain (0) Comment: Data Unavailable   Patient's last menstrual period was 01/01/2009.  Allergies reviewed: Yes  Medications reviewed: Yes    Medications: Medication refills not needed today.  Pharmacy name entered into [employer]: [id number] - [place], [place] - [address] AT [facility] [address]    Clinical concerns: Patient denies pelvic and vaginal pain at today's visit.     Rebecca Fiore CMA      Again, thank you for allowing me to participate in the care of your patient.        Sincerely,        MARLON Jasmine CNP    "

## 2020-10-23 ENCOUNTER — TELEPHONE (OUTPATIENT)
Dept: UROLOGY | Facility: CLINIC | Age: 65
End: 2020-10-23

## 2020-10-23 NOTE — TELEPHONE ENCOUNTER
[facility] Call Center    Phone Message    May a detailed message be left on voicemail: yes     Reason for Call: Appointment Intake    Referring Provider Name: Iris Dalton APRN CNP   Diagnosis and/or Symptoms: Angiomyolipoma of kidney     Pt needs a uro appt for Angiomyolipoma of kidney , they originally contacted nephrology and stated we handle this, not seeing on protocol, please call pt to schedule thanks    Action Taken: Message routed to:  [facility] ([facility] uro    Travel Screening: Not Applicable

## 2020-10-26 NOTE — TELEPHONE ENCOUNTER
First available appt is 12/18/2020 with maciej at 8 am  I put in hold but no name thanks Nicol Doe LPN Staff Nurse

## 2020-11-08 ENCOUNTER — OFFICE VISIT (OUTPATIENT)
Dept: URGENT CARE | Facility: URGENT CARE | Age: 65
End: 2020-11-08
Payer: COMMERCIAL

## 2020-11-08 VITALS
RESPIRATION RATE: 12 BRPM | WEIGHT: 214 LBS | TEMPERATURE: 99.1 F | BODY MASS INDEX: 37.92 KG/M2 | SYSTOLIC BLOOD PRESSURE: 118 MMHG | HEART RATE: 99 BPM | HEIGHT: 63 IN | DIASTOLIC BLOOD PRESSURE: 70 MMHG

## 2020-11-08 DIAGNOSIS — L98.9 SKIN LESION: Primary | ICD-10-CM

## 2020-11-08 PROCEDURE — 99213 OFFICE O/P EST LOW 20 MIN: CPT | Performed by: FAMILY MEDICINE

## 2020-11-08 RX ORDER — CEPHALEXIN 500 MG/1
500 CAPSULE ORAL 3 TIMES DAILY
Qty: 21 CAPSULE | Refills: 0 | Status: SHIPPED | OUTPATIENT
Start: 2020-11-08 | End: 2020-12-01

## 2020-11-08 ASSESSMENT — MIFFLIN-ST. JEOR: SCORE: 1479.83

## 2020-11-09 NOTE — PATIENT INSTRUCTIONS
Cephalexin three times a day for 7 days      If the area gets more red/swollen/painful please return right away for evaluation      Cover the area with a bandage and replace daily until this is healed over

## 2020-11-09 NOTE — PROGRESS NOTES
Subjective:   Hafsa Corona is a 65 year old female who presents for   Chief Complaint   Patient presents with     Urgent Care     Derm Problem     wound on left thigh that is old that may be infected.      Insect Bites     would like some bug bites looked at.      Flu Shot     pt  would like flu shot if possible.      Patient is here for a wound on her left lateral thigh that has been present    Reports a 2017 dog bite (her dog) on her left thigh -- she ended up having to pack the wound months later after the original injury.   She reports over the last couple weeks there appears to be a slight amount of bleeding coming from the area. There has been a persistent in this area which they presumed to be old scar tissue.     She notes there has been some crusting with occasional draining on/off for last 2 weeks.   7y    She reports no fevers.       Patient Active Problem List    Diagnosis Date Noted     At high risk for breast cancer 09/27/2019     Priority: Medium     30.3% lifetime risk by SHERON model       Abscess of left lower extremity 09/21/2018     Priority: Medium     ADD (attention deficit disorder) without hyperactivity 09/21/2018     Priority: Medium     Anxiety 09/21/2018     Priority: Medium     Depressive disorder 09/21/2018     Priority: Medium     Normocytic anemia 09/21/2018     Priority: Medium     Panic disorder with agoraphobia 09/21/2018     Priority: Medium     Personal history of DVT (deep vein thrombosis) 09/13/2018     Priority: Medium     Encounter for follow-up surveillance of ovarian cancer 08/24/2018     Priority: Medium     Solitary kidney, acquired 07/09/2018     Priority: Medium     Angiomyolipoma of kidney 05/16/2018     Priority: Medium     Renal oncocytoma 05/16/2018     Priority: Medium     Renal cell carcinoma, right (H) 05/10/2018     Priority: Medium     ACP (advance care planning) 12/15/2017     Priority: Medium     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) 09/15/2017      Priority: Medium     Pain in joint, multiple sites 09/15/2017     Priority: Medium     Ovarian cancer (H) 08/30/2017     Priority: Medium     Ovarian cancer, left (H) 08/01/2017     Priority: Medium     SCP given 8.24.18       Encounter for long-term (current) use of medications 08/01/2017     Priority: Medium     Lymphedema 07/25/2017     Priority: Medium     S/P laparotomy 07/07/2017     Priority: Medium     Long-term (current) use of anticoagulants [Z79.01] 06/06/2017     Priority: Medium     Major depressive disorder, recurrent episode, mild (H) 09/06/2016     Priority: Medium     Lumbago 04/04/2016     Priority: Medium     Essential hypertension 02/09/2016     Priority: Medium     BMI > 35 10/14/2015     Priority: Medium     Coronary atherosclerosis 10/31/2013     Priority: Medium     Overview:   + calcium score on EBCT.  No clinical history.       Heel pain 01/15/2013     Priority: Medium     Allergic rhinitis due to other allergen 12/20/2011     Priority: Medium     Dyslipidemia 11/29/2011     Priority: Medium     Mild anemia 11/29/2011     Priority: Medium     Type 2 diabetes mellitus without complication (H) 10/31/2010     Priority: Medium     HYPERLIPIDEMIA LDL GOAL <100 10/31/2010     Priority: Medium     Chronic Maxillary Sinusitis 11/21/2008     Priority: Medium     Tachycardia      Priority: Medium     Dysthymic disorder 07/22/2005     Priority: Medium     Generalized anxiety disorder 07/22/2005     Priority: Medium     Attention deficit disorder 07/19/2005     Priority: Medium     Problem list name updated by automated process. Provider to review       PANIC DISORDER  07/19/2005     Priority: Medium     VASOMOTOR RHINITIS 07/19/2005     Priority: Medium       Current Outpatient Medications   Medication     Acetaminophen (TYLENOL PO)     atorvastatin (LIPITOR) 80 MG tablet     B Complex Vitamins (VITAMIN B-COMPLEX PO)     CALCIUM-MAGNESIUM-VITAMIN D PO     cephALEXin (KEFLEX) 500 MG capsule      "Cholecalciferol (VITAMIN D) 1000 UNIT capsule     DiphenhydrAMINE HCl (BENADRYL PO)     EPINEPHrine (EPIPEN/ADRENACLICK/OR ANY BX GENERIC EQUIV) 0.3 MG/0.3ML injection 2-pack     escitalopram (LEXAPRO) 20 MG tablet     fluticasone (FLONASE) 50 MCG/ACT nasal spray     LORazepam (ATIVAN) 1 MG tablet     Magnesium Hydroxide (MAGNESIA PO)     metFORMIN (GLUCOPHAGE-XR) 500 MG 24 hr tablet     Multiple Vitamins-Minerals (MULTIVITAMIN ADULT PO)     nitroFURantoin macrocrystal-monohydrate (MACROBID) 100 MG capsule     Probiotic Product (PRO-BIOTIC BLEND PO)     rivaroxaban ANTICOAGULANT (XARELTO) 20 MG TABS tablet     tamoxifen (NOLVADEX) 10 MG tablet     tamoxifen (NOLVADEX) 10 MG tablet     traZODone (DESYREL) 50 MG tablet     No current facility-administered medications for this visit.        ROS:  As above per HPI    Objective:   /70   Pulse 99   Temp 99.1  F (37.3  C) (Oral)   Resp 12   Ht 1.6 m (5' 3\")   Wt 97.1 kg (214 lb)   LMP 01/01/2009   BMI 37.91 kg/m  , Body mass index is 37.91 kg/m .  Gen:  NAD, well-nourished, sitting in chair comfortably  HEENT: EOMI, sclera anicteric, Head normocephalic, ; nares patent; moist mucous membranes  Neck: trachea midline, no thyromegaly  CV:  Hemodynamically stable,  Pulm:  no increased work of breathing   Extrem: no cyanosis, edema or clubbing  L lateral thigh: palpable firm lump in this area that is non-fluctuant (distal to open tract) there is a small oepning about 2 mm diameter in the skin with no present discharge. No warmth or redness of this area.         No results found for any visits on 11/08/20.    Assessment & Plan:   Hafsa Corona, 65 year old female who presents with:    Skin lesion  - cephALEXin (KEFLEX) 500 MG capsule  Dispense: 21 capsule; Refill: 0    It's certainly possible that bacteria may have formed underneath the skin which caused the initial disruption in skin which is now appearing as a very small sinus tract. No fluctuance seen in this area " - concern for infection is low. Patient is concerned about infection we agreed on doing a 7 day course of cephalexin. F/u if symptoms worsen. Dressing applied over the area prior to discharge.     Darwin Pizarro MD   [place] [facility]    The use of Dragon/Penemarie K Murphy dictation services may have been used to construct the content in this note; any grammatical or spelling errors are non-intentional. Please contact the author of this note directly if you are in need of any clarification.

## 2020-11-13 ENCOUNTER — TELEPHONE (OUTPATIENT)
Dept: URGENT CARE | Facility: URGENT CARE | Age: 65
End: 2020-11-13

## 2020-11-13 NOTE — TELEPHONE ENCOUNTER
Please have patient complete the entire 7 days of antibiotics and then contact us.  She should be on day 5 at this point.

## 2020-11-15 NOTE — TELEPHONE ENCOUNTER
Informed pt to finish course of med. Advised I will call and check on her tomorrow which will be 7 days.   george villa

## 2020-11-16 NOTE — TELEPHONE ENCOUNTER
Spoke with patient and Mary Noonan PA-C. Called in 3 more days of Keflex to [facility] [place].  george villa

## 2020-11-20 DIAGNOSIS — Z86.718 PERSONAL HISTORY OF DVT (DEEP VEIN THROMBOSIS): ICD-10-CM

## 2020-11-20 NOTE — TELEPHONE ENCOUNTER
Hafsa's pharmacy reached out today looking for a prescription refill for Hafsa's Xarelto.     Hafsa is on Xarelto 20 mg every 24 hours. She was last seen 9/12/19 with instruction to stay on Xarelto for 1 year and RTC.     90 day supply given, no refill- patient and  currently in contact to schedule follow up.    Liseth Freeman, MSN, RN, PHN - Nurse Clinician - [facility] - [phone]

## 2020-11-25 ENCOUNTER — VIRTUAL VISIT (OUTPATIENT)
Dept: HEMATOLOGY | Facility: CLINIC | Age: 65
End: 2020-11-25
Attending: INTERNAL MEDICINE
Payer: COMMERCIAL

## 2020-11-25 VITALS — WEIGHT: 214 LBS | BODY MASS INDEX: 37.91 KG/M2

## 2020-11-25 DIAGNOSIS — Z86.718 PERSONAL HISTORY OF DVT (DEEP VEIN THROMBOSIS): ICD-10-CM

## 2020-11-25 PROCEDURE — 99213 OFFICE O/P EST LOW 20 MIN: CPT | Mod: GT | Performed by: INTERNAL MEDICINE

## 2020-11-25 NOTE — PROGRESS NOTES
Patient was contacted to complete the pre-visit call prior to their video visit with the provider.  The following statement was read:       This visit will be billed to your insurance the same as an in-person visit. Because of Coronavirus we are instituting video visits when possible to keep everyone safe. This video visit will be conducted between you and the provider.  This service lets us provide the care you need with a video conversation.  If a prescription is necessary, we can send it directly to your pharmacy.If lab work or other testing is needed, we can help arrange a place/time for that to be done at a later date.If during the course of the call the provider feels a video visit is not appropriate, then your insurance company will not be billed.       Allergies and medications were reviewed and travel screening complete.     A test connection was Completed with Pt? No    I thanked them for their time to cover this information     Isidoro Moreno [facility]        [facility]  [address] [place] [address] [address], [facility], [place] [address]  Phone: [phone], Fax: [phone].      Outpatient Visit Note:    Patient: Hafsa Corona  MRN: [id number]  : 1955  CLIFF: 2020      Reason for Visit:  Follow up for distal DVT, cancer-associated    Forward History:  Presented 2017 with LLE pain and swelling, found to have a distal DVT  2017 switched from warfarin to rivaroxaban, plan for 3 months of therapy and consider duration of therapy  2017 found to have new ovarian mass and underwent ex lap for resection of what was found to have stage 1C2 clear cell carcinoma.  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  She is doing well with no complaints. She notes no bleeding associated with her use of anticoagulation.  She notes no leg swelling or pain.  No chest pain, dyspnea on exertion.  She currently is without any evidence of active cancer and doing very well.  Given recent press about COVID-19 and thrombosis risk, she is very concerned about coming off anticoagulation.  She describes a severe URI in Sept 2020, possibly COVID, though tested negative at the time.    Medications:  Current Outpatient Medications   Medication Sig Dispense Refill     Acetaminophen (TYLENOL PO) Take 500 mg by mouth 2 tabs prn pain (monthly)       atorvastatin (LIPITOR) 80 MG tablet Take 1 tablet (80 mg) by mouth daily 90 tablet PRN     B Complex Vitamins (VITAMIN B-COMPLEX PO) Take 50 mg by mouth        CALCIUM-MAGNESIUM-VITAMIN D PO Take 2 tablets by mouth daily       Cholecalciferol (VITAMIN D) 1000 UNIT capsule Take 2,000 Units by mouth daily        DiphenhydrAMINE HCl (BENADRYL PO) Take 50 mg by mouth daily as needed (monthly)       EPINEPHrine (EPIPEN/ADRENACLICK/OR ANY BX GENERIC EQUIV) 0.3 MG/0.3ML injection 2-pack Inject 0.3 mLs (0.3 mg) into the muscle once as needed for anaphylaxis 0.3 mL PRN     escitalopram (LEXAPRO) 20 MG tablet Take 1 tablet (20 mg) by mouth daily 90 tablet 3     fluticasone (FLONASE) 50 MCG/ACT nasal spray SHAKE LIQUID AND USE 2 SPRAYS IN EACH NOSTRIL DAILY 48 g 3     LORazepam (ATIVAN) 1 MG tablet Take 1 tablet (1 mg) by mouth every 6 hours as needed (nausea/vomiting, anxiety or sleep) 30 tablet 1     Magnesium Hydroxide (MAGNESIA PO) Take 500 mg by mouth       metFORMIN (GLUCOPHAGE-XR) 500 MG 24 hr tablet TAKE 2 TABLETS BY MOUTH EVERY DAY WITH THE EVENING MEAL 180 tablet PRN     Multiple Vitamins-Minerals (MULTIVITAMIN ADULT PO) Take 1 tablet by mouth daily       nitroFURantoin macrocrystal-monohydrate (MACROBID) 100 MG capsule Take one capsule after  intercourse as needed 30 capsule PRN     Probiotic Product (PRO-BIOTIC BLEND PO) Take 1 capsule by mouth daily Enzymatic Therapy-probiotic pearls       rivaroxaban ANTICOAGULANT (XARELTO) 20 MG TABS tablet Take 1 tablet (20 mg) by mouth daily (with dinner) 90 tablet 0     tamoxifen (NOLVADEX) 10 MG tablet Take 0.5 tablets (5 mg) by mouth daily Cut each pill in 1/2 with pill cutter to ensure 5 mg daily dose. 45 tablet 1     tamoxifen (NOLVADEX) 10 MG tablet Cut each pill in 1/2 with pill cutter to ensure 5 mg daily dose. 45 tablet 3     traZODone (DESYREL) 50 MG tablet TAKE 1 TO 2 TABLETS(50  MG) BY MOUTH EVERY NIGHT AS NEEDED FOR SLEEP 180 tablet PRN        Allergies:  Allergies   Allergen Reactions     Paclitaxel Anaphylaxis and Difficulty breathing     Wasps [Hornets] Anaphylaxis     Yellow Hornet Venom [Hornet Venom] Anaphylaxis and Swelling     Yellow Jacket Venom [Venomil Honey Bee] Anaphylaxis and Swelling     Sulfa Drugs Hives and Rash       ROS:  A 14 point ROS is negative except as stated in the HPI    Objective:  Exam:  Constitutional: Appears well, no distress  Eyes: no discharge, injection or icterus  Respiratory: no cough or labored breathing  Skin: no rashes or petechiae  Neurological: no deficits appreciated, speech is fluent  Psych: affect is normal    Labs:  Results for HAFSA BRISENO (MRN [id number]) as of 10/1/2019 14:07   Ref. Range 9/27/2019 13:38   Creatinine Latest Ref Range: 0.52 - 1.04 mg/dL 1.15 (H)   GFR Estimate Latest Ref Range: >60 mL/min/1.73_m2 50 (L)       Imaging:  none    Assessment:  In summary, Hafsa Briseno is a 65 year old woman with cancer-associated distal DVT, now in remission after treatment with surgery and chemotherapy but continues on secondary prophylaxis with rivaroxaban.    Plan:  1. Majority of today's visit was spent counseling the patient regarding cancer its association with risk for VTE.  2. Continue rivaroxaban 20mg daily for secondary prophylaxis.  3. For  interruption of anticoagulation, she should stop therapy 1 day prior to the minor procedure or 2 days before a major surgical procedure.  She may resume anticoagulation at discussion of her surgeon.  4. RTC 1 year and call with questions or concerns in the meantime        The patient is given our center's contact information and is instructed to call if she should have any further questions or concerns.  Otherwise, we will plan on seeing her back in 1 year.      William Villarreal MD   of Medicine  [facility] [employer] [facility]     Video-Visit Details    Type of service:  Video Visit    Video Start Time: 415 PM  Video End Time: 4:29 PM    Originating Location (pt. Location): Home    Distant Location (provider location):  [facility]     Platform used for Video Visit: [employer]

## 2020-12-01 ENCOUNTER — OFFICE VISIT (OUTPATIENT)
Dept: FAMILY MEDICINE | Facility: CLINIC | Age: 65
End: 2020-12-01
Payer: COMMERCIAL

## 2020-12-01 VITALS
HEART RATE: 102 BPM | TEMPERATURE: 98.3 F | BODY MASS INDEX: 37.07 KG/M2 | HEIGHT: 63 IN | WEIGHT: 209.25 LBS | OXYGEN SATURATION: 94 % | DIASTOLIC BLOOD PRESSURE: 77 MMHG | SYSTOLIC BLOOD PRESSURE: 114 MMHG

## 2020-12-01 DIAGNOSIS — Z23 NEED FOR PROPHYLACTIC VACCINATION AGAINST STREPTOCOCCUS PNEUMONIAE (PNEUMOCOCCUS): ICD-10-CM

## 2020-12-01 DIAGNOSIS — E66.01 MORBID OBESITY (H): ICD-10-CM

## 2020-12-01 DIAGNOSIS — F33.0 MAJOR DEPRESSIVE DISORDER, RECURRENT EPISODE, MILD (H): ICD-10-CM

## 2020-12-01 DIAGNOSIS — Z87.440 HISTORY OF RECURRENT URINARY TRACT INFECTION: ICD-10-CM

## 2020-12-01 DIAGNOSIS — N18.31 STAGE 3A CHRONIC KIDNEY DISEASE (H): ICD-10-CM

## 2020-12-01 DIAGNOSIS — G47.00 INSOMNIA, UNSPECIFIED TYPE: ICD-10-CM

## 2020-12-01 DIAGNOSIS — Z20.828 CONTACT WITH OR EXPOSURE TO VIRAL DISEASE: ICD-10-CM

## 2020-12-01 DIAGNOSIS — E78.5 HYPERLIPIDEMIA LDL GOAL <100: ICD-10-CM

## 2020-12-01 DIAGNOSIS — L02.416 CUTANEOUS ABSCESS OF LEFT LOWER EXTREMITY: ICD-10-CM

## 2020-12-01 DIAGNOSIS — Z00.00 ENCOUNTER FOR MEDICARE ANNUAL WELLNESS EXAM: Primary | ICD-10-CM

## 2020-12-01 DIAGNOSIS — E11.9 TYPE 2 DIABETES MELLITUS WITHOUT COMPLICATION, WITHOUT LONG-TERM CURRENT USE OF INSULIN (H): ICD-10-CM

## 2020-12-01 PROBLEM — N18.30 CHRONIC KIDNEY DISEASE, STAGE 3 (H): Status: ACTIVE | Noted: 2020-12-01

## 2020-12-01 LAB
CHOLEST SERPL-MCNC: 156 MG/DL
CREAT UR-MCNC: 186 MG/DL
HBA1C MFR BLD: 6 % (ref 0–5.6)
HDLC SERPL-MCNC: 50 MG/DL
LDLC SERPL CALC-MCNC: 69 MG/DL
MICROALBUMIN UR-MCNC: 6 MG/L
MICROALBUMIN/CREAT UR: 3.36 MG/G CR (ref 0–25)
NONHDLC SERPL-MCNC: 106 MG/DL
TRIGL SERPL-MCNC: 185 MG/DL

## 2020-12-01 PROCEDURE — 90732 PPSV23 VACC 2 YRS+ SUBQ/IM: CPT | Performed by: NURSE PRACTITIONER

## 2020-12-01 PROCEDURE — 99397 PER PM REEVAL EST PAT 65+ YR: CPT | Mod: 25 | Performed by: NURSE PRACTITIONER

## 2020-12-01 PROCEDURE — 82043 UR ALBUMIN QUANTITATIVE: CPT | Performed by: NURSE PRACTITIONER

## 2020-12-01 PROCEDURE — 80061 LIPID PANEL: CPT | Performed by: NURSE PRACTITIONER

## 2020-12-01 PROCEDURE — 99214 OFFICE O/P EST MOD 30 MIN: CPT | Mod: 25 | Performed by: NURSE PRACTITIONER

## 2020-12-01 PROCEDURE — 36415 COLL VENOUS BLD VENIPUNCTURE: CPT | Performed by: NURSE PRACTITIONER

## 2020-12-01 PROCEDURE — 83036 HEMOGLOBIN GLYCOSYLATED A1C: CPT | Performed by: NURSE PRACTITIONER

## 2020-12-01 PROCEDURE — 90471 IMMUNIZATION ADMIN: CPT | Performed by: NURSE PRACTITIONER

## 2020-12-01 RX ORDER — NITROFURANTOIN 25; 75 MG/1; MG/1
CAPSULE ORAL
Qty: 30 CAPSULE | Status: SHIPPED | OUTPATIENT
Start: 2020-12-01 | End: 2021-07-27

## 2020-12-01 RX ORDER — ATORVASTATIN CALCIUM 80 MG/1
80 TABLET, FILM COATED ORAL DAILY
Qty: 90 TABLET | Status: SHIPPED | OUTPATIENT
Start: 2020-12-01 | End: 2020-12-23

## 2020-12-01 RX ORDER — TRAZODONE HYDROCHLORIDE 50 MG/1
TABLET, FILM COATED ORAL
Qty: 180 TABLET | Status: SHIPPED | OUTPATIENT
Start: 2020-12-01 | End: 2022-07-06

## 2020-12-01 RX ORDER — CEPHALEXIN 500 MG/1
500 CAPSULE ORAL 3 TIMES DAILY
Qty: 21 CAPSULE | Refills: 0 | Status: SHIPPED | OUTPATIENT
Start: 2020-12-01 | End: 2021-08-04

## 2020-12-01 RX ORDER — METFORMIN HCL 500 MG
TABLET, EXTENDED RELEASE 24 HR ORAL
Qty: 180 TABLET | Status: SHIPPED | OUTPATIENT
Start: 2020-12-01 | End: 2020-12-23

## 2020-12-01 RX ORDER — ESCITALOPRAM OXALATE 20 MG/1
20 TABLET ORAL DAILY
Qty: 90 TABLET | Refills: 3 | Status: SHIPPED | OUTPATIENT
Start: 2020-12-01 | End: 2020-12-23

## 2020-12-01 ASSESSMENT — ENCOUNTER SYMPTOMS
DIZZINESS: 1
MYALGIAS: 0
CHILLS: 0
ARTHRALGIAS: 0
NERVOUS/ANXIOUS: 1
DYSURIA: 0
SORE THROAT: 0
ABDOMINAL PAIN: 0
CONSTIPATION: 0
SHORTNESS OF BREATH: 0
FEVER: 0
HEARTBURN: 0
JOINT SWELLING: 0
HEADACHES: 0
PARESTHESIAS: 0
FREQUENCY: 0
WEAKNESS: 0
HEMATOCHEZIA: 0
PALPITATIONS: 0
DIARRHEA: 0
EYE PAIN: 0
HEMATURIA: 0
NAUSEA: 0
COUGH: 0

## 2020-12-01 ASSESSMENT — MIFFLIN-ST. JEOR: SCORE: 1458.28

## 2020-12-01 ASSESSMENT — PATIENT HEALTH QUESTIONNAIRE - PHQ9
SUM OF ALL RESPONSES TO PHQ QUESTIONS 1-9: 3
SUM OF ALL RESPONSES TO PHQ QUESTIONS 1-9: 3
10. IF YOU CHECKED OFF ANY PROBLEMS, HOW DIFFICULT HAVE THESE PROBLEMS MADE IT FOR YOU TO DO YOUR WORK, TAKE CARE OF THINGS AT HOME, OR GET ALONG WITH OTHER PEOPLE: SOMEWHAT DIFFICULT

## 2020-12-01 ASSESSMENT — ACTIVITIES OF DAILY LIVING (ADL): CURRENT_FUNCTION: HOUSEWORK REQUIRES ASSISTANCE

## 2020-12-01 NOTE — PATIENT INSTRUCTIONS
Patient Education   Personalized Prevention Plan  You are due for the preventive services outlined below.  Your care team is available to assist you in scheduling these services.  If you have already completed any of these items, please share that information with your care team to update in your medical record.  Health Maintenance Due   Topic Date Due     Depression Action Plan  1955     Zoster (Shingles) Vaccine (1 of 2) 03/09/2004     Depression Assessment  03/06/2017     Pneumococcal Vaccine (1 of 1 - PPSV23) 03/09/2019     Eye Exam  11/28/2019     A1C Lab  04/01/2020     FALL RISK ASSESSMENT  09/27/2020     Kidney Microalbumin Urine Test  10/01/2020     Diabetic Foot Exam  10/01/2020                 My Depression Action Plan  Name: Hafsa Corona   Date of Birth 1955  Date: 12/1/2020    My doctor: Morelia Ayon   My clinic: [facility] [address] [place] [address]  [phone]          GREEN    ZONE   Good Control    What it looks like:     Things are going generally well. You have normal ups and downs. You may even feel depressed from time to time, but bad moods usually last less than a day.   What you need to do:  1. Continue to care for yourself (see self care plan)  2. Check your depression survival kit and update it as needed  3. Follow your physician s recommendations including any medication.  4. Do not stop taking medication unless you consult with your physician first.           YELLOW         ZONE Getting Worse    What it looks like:     Depression is starting to interfere with your life.     It may be hard to get out of bed; you may be starting to isolate yourself from others.    Symptoms of depression are starting to last most all day and this has happened for several days.     You may have suicidal thoughts but they are not constant.   What you need to do:     1. Call your care team. Your response to treatment will improve if you keep your  care team informed of your progress. Yellow periods are signs an adjustment may need to be made.     2. Continue your self-care.  Just get dressed and ready for the day.  Don't give yourself time to talk yourself out of it.    3. Talk to someone in your support network.    4. Open up your Depression Self-Care Plan/Wellness Kit.           RED    ZONE Medical Alert - Get Help    What it looks like:     Depression is seriously interfering with your life.     You may experience these or other symptoms: You can t get out of bed most days, can t work or engage in other necessary activities, you have trouble taking care of basic hygiene, or basic responsibilities, thoughts of suicide or death that will not go away, self-injurious behavior.     What you need to do:  1. Call your care team and request a same-day appointment. If they are not available (weekends or after hours) call your local crisis line, emergency room or 911.            Depression Self-Care Plan / Wellness Kit    Self-Care for Depression  Here s the deal. Your body and mind are really not as separate as most people think.  What you do and think affects how you feel and how you feel influences what you do and think. This means if you do things that people who feel good do, it will help you feel better.  Sometimes this is all it takes.  There is also a place for medication and therapy depending on how severe your depression is, so be sure to consult with your medical provider and/ or Behavioral Health Consultant if your symptoms are worsening or not improving.     In order to better manage my stress, I will:    Exercise  Get some form of exercise, every day. This will help reduce pain and release endorphins, the  feel good  chemicals in your brain. This is almost as good as taking antidepressants!  This is not the same as joining a gym and then never going! (they count on that by the way ) It can be as simple as just going for a walk or doing some gardening,  anything that will get you moving.      Hygiene   Maintain good hygiene (get out of bed in the morning, make your bed, brush your teeth, take a shower, and get dressed like you were going to work, even if you are unemployed).  If your clothes don't fit try to get ones that do.    Diet  Strive to eat foods that are good for me, drink plenty of water, and avoid excessive sugar, caffeine, alcohol, and other mood-altering substances.  Some foods that are helpful in depression are: complex carbohydrates, B vitamins, flaxseed, fish or fish oil, fresh fruits and vegetables.    Psychotherapy  Agree to participate in Individual Therapy (if recommended).    Medication  If prescribed medications, I agree to take them.  Missing doses can result in serious side effects.  I understand that drinking alcohol, or other illicit drug use, may cause potential side effects.  I will not stop my medication abruptly without first discussing it with my provider.    Staying Connected With Others  Stay in touch with my friends, family members, and my primary care provider/team.    Use your imagination  Be creative.  We all have a creative side; it doesn t matter if it s oil painting, sand castles, or mud pies! This will also kick up the endorphins.    Witness Beauty  (AKA stop and smell the roses) Take a look outside, even in mid-winter. Notice colors, textures. Watch the squirrels and birds.     Service to others  Be of service to others.  There is always someone else in need.  By helping others we can  get out of ourselves  and remember the really important things.  This also provides opportunities for practicing all the other parts of the program.    Humor  Laugh and be silly!  Adjust your TV habits for less news and crime-drama and more comedy.    Control your stress  Try breathing deep, massage therapy, biofeedback, and meditation. Find time to relax each day.     Crisis Text Line  http://www.crisistextline.org    The Crisis Text Line  serves anyone, in any type of crisis, providing access to free, 24/7 support and information via the medium people already use and trust:    Here's how it works:  1.  Text 437-508 from anywhere in the [place], anytime, about any type of crisis.  2.  A live, trained Crisis Counselor receives the text and responds quickly.  3.  The volunteer Crisis Counselor will help you move from a 'hot moment to a cool moment'.    My support system    Clinic Contact:  Phone number:    Contact 1:  Phone number:    Contact 2:  Phone number:    Uatsdin/:  Phone number:    Therapist:  Phone number:    Local crisis center:    Phone number:    Other community support:  Phone number:

## 2020-12-01 NOTE — NURSING NOTE
Prior to immunization administration, verified patients identity using patient s name and date of birth. Please see Immunization Activity for additional information.     Screening Questionnaire for Adult Immunization    Are you sick today?   No   Do you have allergies to medications, food, a vaccine component or latex?   No   Have you ever had a serious reaction after receiving a vaccination?   No   Do you have a long-term health problem with heart, lung, kidney, or metabolic disease (e.g., diabetes), asthma, a blood disorder, no spleen, complement component deficiency, a cochlear implant, or a spinal fluid leak?  Are you on long-term aspirin therapy?   1 kidney, diabetes    Do you have cancer, leukemia, HIV/AIDS, or any other immune system problem?   Cancer in 2017 & 2018   Do you have a parent, brother, or sister with an immune system problem?   No   In the past 3 months, have you taken medications that affect  your immune system, such as prednisone, other steroids, or anticancer drugs; drugs for the treatment of rheumatoid arthritis, Crohn s disease, or psoriasis; or have you had radiation treatments?   No   Have you had a seizure, or a brain or other nervous system problem?   No   During the past year, have you received a transfusion of blood or blood    products, or been given immune (gamma) globulin or antiviral drug?   No   For women: Are you pregnant or is there a chance you could become       pregnant during the next month?   No   Have you received any vaccinations in the past 4 weeks?   No     Immunization questionnaire was positive for at least one answer.        Per orders of Morelia Ayon, injection of Pnemovax 23 given by Shannon Tello. Patient instructed to remain in clinic for 15 minutes afterwards, and to report any adverse reaction to me immediately.       Screening performed by Shannon Tello on 12/1/2020 at 12:05 PM.

## 2020-12-01 NOTE — PROGRESS NOTES
"SUBJECTIVE:   Hafsa Corona is a 65 year old female who presents for Preventive Visit.      Are you in the first 12 months of your Medicare coverage?  Medicare Part A only     Healthy Habits:     In general, how would you rate your overall health?  Good    Frequency of exercise:  2-3 days/week    Duration of exercise:  15-30 minutes    Do you usually eat at least 4 servings of fruit and vegetables a day, include whole grains    & fiber and avoid regularly eating high fat or \"junk\" foods?  No    Taking medications regularly:  Yes    Medication side effects:  None    Ability to successfully perform activities of daily living:  Housework requires assistance    Home Safety:  Lack of grab bars in the bathroom    Hearing Impairment:  Difficulty following a conversation in a noisy restaurant or crowded room and no hearing concerns    In the past 6 months, have you been bothered by leaking of urine? Yes    In general, how would you rate your overall mental or emotional health?  Good      PHQ-2 Total Score: 0    Additional concerns today:  Yes    She has been feeling more anxious the past couple of weeks.  She is distressed about some things in the world and feels like it has been more difficult to cope.  She has been having some difficulty sleeping, takes Trazodone PRN, which is helpful.  Overall, Lexapro has been helpful.  She does not have a current therapist.      Blood sugars have been well-controlled.  She is doing well on her medications and denies any side effects.    She had a dog bite over three years ago left thigh.  She recently had a worsening of the firmness in this area, redness.  Was seen in UC and given Keflex.  Just yesterday, it started bleeding and this has decreased the size.  She denies any fevers.      Do you feel safe in your environment? Yes    Have you ever done Advance Care Planning? (For example, a Health Directive, POLST, or a discussion with a medical provider or your loved ones about your " wishes): MA gave pt information       Fall risk  Fallen 2 or more times in the past year?: No  Any fall with injury in the past year?: No    Cognitive Screening   1) Repeat 3 items (Leader, Season, Table)    2) Clock draw: NORMAL  3) 3 item recall: Recalls 3 objects  Results: 3 items recalled: COGNITIVE IMPAIRMENT LESS LIKELY    Mini-CogTM Copyright HEATHER Mccann. Licensed by the author for use in [facility]; reprinted with permission (soob@[facility]). All rights reserved.          Reviewed and updated as needed this visit by clinical staff                 Reviewed and updated as needed this visit by Provider                Social History     Tobacco Use     Smoking status: Never Smoker     Smokeless tobacco: Never Used   Substance Use Topics     Alcohol use: Yes     Comment: Very infrequent, a bit of wine or beer 2x per month maybe         Alcohol Use 12/1/2020   Prescreen: >3 drinks/day or >7 drinks/week? No               Current providers sharing in care for this patient include:   Patient Care Team:  Morelia Ayon NP as PCP - General  Karla Oswald RN as Continuity Care Coordinator (Gynecologic Oncology)  Nabeel Dick MD as MD (Urology)  Javier Gregorio MD as MD (Urology)  Lola Vasquez, PhD LP as Psychologist (Psychology)  Olesya Blake APRN CNP as Assigned PCP  Iris Dalton APRN CNP as Assigned OBGYN Provider  Jarod Shaffer MD as Assigned Cancer Care Provider    The following health maintenance items are reviewed in Epic and correct as of today:  Health Maintenance   Topic Date Due     ZOSTER IMMUNIZATION (1 of 2) 03/09/2004     PHQ-9  03/06/2017     Pneumococcal Vaccine: 65+ Years (1 of 1 - PPSV23) 03/09/2019     EYE EXAM  11/28/2019     A1C  04/01/2020     FALL RISK ASSESSMENT  09/27/2020     MICROALBUMIN  10/01/2020     DIABETIC FOOT EXAM  10/01/2020     BMP  03/27/2021     LIPID  03/27/2021     MEDICARE ANNUAL WELLNESS VISIT  12/01/2021     MAMMO SCREENING   "10/01/2022     ADVANCE CARE PLANNING  12/15/2022     COLORECTAL CANCER SCREENING  01/06/2025     DTAP/TDAP/TD IMMUNIZATION (2 - Td) 02/04/2026     HEPATITIS C SCREENING  Completed     DEPRESSION ACTION PLAN  Completed     INFLUENZA VACCINE  Completed     Pneumococcal Vaccine: Pediatrics (0 to 5 Years) and At-Risk Patients (6 to 64 Years)  Aged Out     IPV IMMUNIZATION  Aged Out     MENINGITIS IMMUNIZATION  Aged Out           Review of Systems   Constitutional: Negative for chills and fever.   HENT: Positive for congestion. Negative for ear pain, hearing loss and sore throat.    Eyes: Negative for pain and visual disturbance.   Respiratory: Negative for cough and shortness of breath.    Cardiovascular: Negative for chest pain, palpitations and peripheral edema.   Gastrointestinal: Negative for abdominal pain, constipation, diarrhea, heartburn, hematochezia and nausea.   Genitourinary: Positive for urgency. Negative for dysuria, frequency, genital sores and hematuria.   Musculoskeletal: Negative for arthralgias, joint swelling and myalgias.   Skin: Negative for rash.   Neurological: Positive for dizziness. Negative for weakness, headaches and paresthesias.   Psychiatric/Behavioral: Negative for mood changes. The patient is nervous/anxious.          OBJECTIVE:   /77   Pulse 102   Temp 98.3  F (36.8  C) (Tympanic)   Ht 1.6 m (5' 3\")   Wt 94.9 kg (209 lb 4 oz)   LMP 01/01/2009   SpO2 94%   BMI 37.07 kg/m   Estimated body mass index is 37.07 kg/m  as calculated from the following:    Height as of this encounter: 1.6 m (5' 3\").    Weight as of this encounter: 94.9 kg (209 lb 4 oz).  Physical Exam  GENERAL: healthy, alert and no distress  EYES: Eyes grossly normal to inspection, PERRL and conjunctivae and sclerae normal  HENT: ear canals and TM's normal, nose and mouth without ulcers or lesions  NECK: no adenopathy, no asymmetry, masses, or scars and thyroid normal to palpation  RESP: lungs clear to " auscultation - no rales, rhonchi or wheezes  BREAST: normal without masses, tenderness or nipple discharge and no palpable axillary masses or adenopathy  CV: regular rate and rhythm, normal S1 S2, no S3 or S4, no murmur, click or rub, no peripheral edema and peripheral pulses strong  ABDOMEN: soft, nontender, no hepatosplenomegaly, no masses and bowel sounds normal  MS: no gross musculoskeletal defects noted, no edema  SKIN: approx 2 cm in diameter area of induration left distal thigh, minimal erythema  NEURO: Normal strength and tone, mentation intact and speech normal  PSYCH: mentation appears normal, affect normal/bright    Diagnostic Test Results:  Labs reviewed in Epic  Results for orders placed or performed in visit on 12/01/20 (from the past 24 hour(s))   Hemoglobin A1c   Result Value Ref Range    Hemoglobin A1C 6.0 (H) 0 - 5.6 %       ASSESSMENT / PLAN:   (Z00.00) Encounter for Medicare annual wellness exam  (primary encounter diagnosis)  Comment:   Plan:     (E11.9) Type 2 diabetes mellitus without complication, without long-term current use of insulin (H)  Comment: at goal  Plan: metFORMIN (GLUCOPHAGE-XR) 500 MG 24 hr tablet,         Hemoglobin A1c, Albumin Random Urine         Quantitative with Creat Ratio        The current medical regimen is effective;  continue present plan and medications.   Follow up in 6 months.     (N18.31) Stage 3a chronic kidney disease  Comment:   Plan: Will check bmp.     (F33.0) Major depressive disorder, recurrent episode, mild (H)  Comment: worsening  Plan: MENTAL HEALTH REFERRAL  - Adult; Outpatient         Treatment; Individual/Couples/Family/Group         Therapy/Health Psychology; [facility]: [facility] [phone]; We will         contact you to schedule the appointment or         please call with any questions, escitalopram         (LEXAPRO) 20 MG tablet        Will refer to therapy.  Continue current medication.     (E78.5) Hyperlipidemia LDL goal  "<100  Comment:   Plan: atorvastatin (LIPITOR) 80 MG tablet, Lipid         panel reflex to direct LDL Fasting        The current medical regimen is effective;  continue present plan and medications.     (Z87.440) History of recurrent urinary tract infection  Comment:   Plan: nitroFURantoin macrocrystal-monohydrate         (MACROBID) 100 MG capsule        Refills given.     (Z20.828) Contact with or exposure to viral disease  Comment:   Plan: COVID-19 Virus (Coronavirus) Antibody & Titer         Reflex            (G47.00) Insomnia, unspecified type  Comment:   Plan: traZODone (DESYREL) 50 MG tablet        The current medical regimen is effective;  continue present plan and medications.     (Z23) Need for prophylactic vaccination against Streptococcus pneumoniae (pneumococcus)  Comment:   Plan: PPSV23, IM/SUBQ (2+ YRS) - [id number], ADMIN         PNEUMOVAX VACCINE (For MEDICARE Patients ONLY)         []            (L02.416) Cutaneous abscess of left lower extremity  Comment:   Plan: DERMATOLOGY ADULT REFERRAL, cephALEXin (KEFLEX)        500 MG capsule        Will renew antibiotic and refer to dermatology for recurrent abscess.  Discussed using warm packs to encourage continued drainage.  Does not appear to need an I & D.  Follow up if symptoms worsen.     (E66.01) BMI > 35  Comment:   Plan: Diet and exercise.     Patient has been advised of split billing requirements and indicates understanding:   COUNSELING:  Reviewed preventive health counseling, as reflected in patient instructions    Estimated body mass index is 37.07 kg/m  as calculated from the following:    Height as of this encounter: 1.6 m (5' 3\").    Weight as of this encounter: 94.9 kg (209 lb 4 oz).    Weight management plan: Discussed healthy diet and exercise guidelines    She reports that she has never smoked. She has never used smokeless tobacco.      Appropriate preventive services were discussed with this patient, including applicable screening " as appropriate for cardiovascular disease, diabetes, osteopenia/osteoporosis, and glaucoma.  As appropriate for age/gender, discussed screening for colorectal cancer, prostate cancer, breast cancer, and cervical cancer. Checklist reviewing preventive services available has been given to the patient.    Reviewed patients plan of care and provided an AVS. The Basic Care Plan (routine screening as documented in Health Maintenance) for Hafsa meets the Care Plan requirement. This Care Plan has been established and reviewed with the Patient.    Counseling Resources:  ATP IV Guidelines  Pooled Cohorts Equation Calculator  Breast Cancer Risk Calculator  Breast Cancer: Medication to Reduce Risk  FRAX Risk Assessment  ICSI Preventive Guidelines  Dietary Guidelines for Americans, 2010  USDA's MyPlate  ASA Prophylaxis  Lung CA Screening    Morelia Ayon NP  [facility]    Identified Health Risks:

## 2020-12-01 NOTE — LETTER
My Depression Action Plan  Name: Hafsa Corona   Date of Birth 1955  Date: 12/1/2020    My doctor: Morelia Ayon   My clinic: [facility] [address] [place] [id number] [phone]          GREEN    ZONE   Good Control    What it looks like:     Things are going generally well. You have normal ups and downs. You may even feel depressed from time to time, but bad moods usually last less than a day.   What you need to do:  1. Continue to care for yourself (see self care plan)  2. Check your depression survival kit and update it as needed  3. Follow your physician s recommendations including any medication.  4. Do not stop taking medication unless you consult with your physician first.           YELLOW         ZONE Getting Worse    What it looks like:     Depression is starting to interfere with your life.     It may be hard to get out of bed; you may be starting to isolate yourself from others.    Symptoms of depression are starting to last most all day and this has happened for several days.     You may have suicidal thoughts but they are not constant.   What you need to do:     1. Call your care team. Your response to treatment will improve if you keep your care team informed of your progress. Yellow periods are signs an adjustment may need to be made.     2. Continue your self-care.  Just get dressed and ready for the day.  Don't give yourself time to talk yourself out of it.    3. Talk to someone in your support network.    4. Open up your Depression Self-Care Plan/Wellness Kit.           RED    ZONE Medical Alert - Get Help    What it looks like:     Depression is seriously interfering with your life.     You may experience these or other symptoms: You can t get out of bed most days, can t work or engage in other necessary activities, you have trouble taking care of basic hygiene, or basic responsibilities, thoughts of suicide or death that will not  go away, self-injurious behavior.     What you need to do:  1. Call your care team and request a same-day appointment. If they are not available (weekends or after hours) call your local crisis line, emergency room or 911.            Depression Self-Care Plan / Wellness Kit    Self-Care for Depression  Here s the deal. Your body and mind are really not as separate as most people think.  What you do and think affects how you feel and how you feel influences what you do and think. This means if you do things that people who feel good do, it will help you feel better.  Sometimes this is all it takes.  There is also a place for medication and therapy depending on how severe your depression is, so be sure to consult with your medical provider and/ or Behavioral Health Consultant if your symptoms are worsening or not improving.     In order to better manage my stress, I will:    Exercise  Get some form of exercise, every day. This will help reduce pain and release endorphins, the  feel good  chemicals in your brain. This is almost as good as taking antidepressants!  This is not the same as joining a gym and then never going! (they count on that by the way ) It can be as simple as just going for a walk or doing some gardening, anything that will get you moving.      Hygiene   Maintain good hygiene (get out of bed in the morning, make your bed, brush your teeth, take a shower, and get dressed like you were going to work, even if you are unemployed).  If your clothes don't fit try to get ones that do.    Diet  Strive to eat foods that are good for me, drink plenty of water, and avoid excessive sugar, caffeine, alcohol, and other mood-altering substances.  Some foods that are helpful in depression are: complex carbohydrates, B vitamins, flaxseed, fish or fish oil, fresh fruits and vegetables.    Psychotherapy  Agree to participate in Individual Therapy (if recommended).    Medication  If prescribed medications, I agree to  take them.  Missing doses can result in serious side effects.  I understand that drinking alcohol, or other illicit drug use, may cause potential side effects.  I will not stop my medication abruptly without first discussing it with my provider.    Staying Connected With Others  Stay in touch with my friends, family members, and my primary care provider/team.    Use your imagination  Be creative.  We all have a creative side; it doesn t matter if it s oil painting, sand castles, or mud pies! This will also kick up the endorphins.    Witness Beauty  (AKA stop and smell the roses) Take a look outside, even in mid-winter. Notice colors, textures. Watch the squirrels and birds.     Service to others  Be of service to others.  There is always someone else in need.  By helping others we can  get out of ourselves  and remember the really important things.  This also provides opportunities for practicing all the other parts of the program.    Humor  Laugh and be silly!  Adjust your TV habits for less news and crime-drama and more comedy.    Control your stress  Try breathing deep, massage therapy, biofeedback, and meditation. Find time to relax each day.     Crisis Text Line  http://www.crisistextline.org    The Crisis Text Line serves anyone, in any type of crisis, providing access to free, 24/7 support and information via the medium people already use and trust:    Here's how it works:  1.  Text 688-783 from anywhere in the [place], anytime, about any type of crisis.  2.  A live, trained Crisis Counselor receives the text and responds quickly.  3.  The volunteer Crisis Counselor will help you move from a 'hot moment to a cool moment'.    My support system    Clinic Contact:  Phone number:    Contact 1:  Phone number:    Contact 2:  Phone number:    Mandaeism/:  Phone number:    Therapist:  Phone number:    Local crisis center:    Phone number:    Other community support:  Phone number:

## 2020-12-02 ASSESSMENT — PATIENT HEALTH QUESTIONNAIRE - PHQ9: SUM OF ALL RESPONSES TO PHQ QUESTIONS 1-9: 3

## 2020-12-07 ENCOUNTER — MYC MEDICAL ADVICE (OUTPATIENT)
Dept: FAMILY MEDICINE | Facility: CLINIC | Age: 65
End: 2020-12-07

## 2020-12-11 DIAGNOSIS — Z20.828 CONTACT WITH OR EXPOSURE TO VIRAL DISEASE: ICD-10-CM

## 2020-12-11 PROCEDURE — 36591 DRAW BLOOD OFF VENOUS DEVICE: CPT

## 2020-12-11 PROCEDURE — 86769 SARS-COV-2 COVID-19 ANTIBODY: CPT | Performed by: NURSE PRACTITIONER

## 2020-12-11 PROCEDURE — 96523 IRRIG DRUG DELIVERY DEVICE: CPT

## 2020-12-11 PROCEDURE — 250N000011 HC RX IP 250 OP 636: Performed by: OBSTETRICS & GYNECOLOGY

## 2020-12-11 RX ORDER — HEPARIN SODIUM (PORCINE) LOCK FLUSH IV SOLN 100 UNIT/ML 100 UNIT/ML
5 SOLUTION INTRAVENOUS ONCE
Status: COMPLETED | OUTPATIENT
Start: 2020-12-11 | End: 2020-12-11

## 2020-12-11 RX ADMIN — Medication 5 ML: at 12:07

## 2020-12-11 NOTE — NURSING NOTE
Chief Complaint   Patient presents with     Lab Only     Labs drawn from port by RN in lab.      Port accessed with 20 gauge gripper needle by RN in lab.  Port flushed with saline and heparin.      Coni Diaz RN

## 2020-12-12 LAB
COVID-19 SPIKE RBD ABY TITER: NORMAL
COVID-19 SPIKE RBD ABY: NEGATIVE

## 2020-12-18 ENCOUNTER — TRANSFERRED RECORDS (OUTPATIENT)
Dept: HEALTH INFORMATION MANAGEMENT | Facility: CLINIC | Age: 65
End: 2020-12-18

## 2020-12-29 ENCOUNTER — TRANSFERRED RECORDS (OUTPATIENT)
Dept: HEALTH INFORMATION MANAGEMENT | Facility: CLINIC | Age: 65
End: 2020-12-29

## 2021-01-19 NOTE — PROGRESS NOTES
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 She is undergoing treatment with carboplatin/paclitaxel.      HPI:  Hafsa comes to the clinic feeling well today. She is here for Taxol desensitization- took her steroids at 0100 and 0700. She was seen for chemotherapy concerns on 9/15/17- was having arthralgias, neuropathic pain, and constipation. Since then she has been feeling well without any further episodes of pain. Continues to take Smooth Move tea for constipation and has been taking Claritin daily for arthralgias. She has started L-glutamine and vitamin B6. Continues with intermittent numbness in her fingertips with her R thumb being the most symptomatic- this pre-dates chemotherapy but has worsened. She types frequently, does not wear braces for carpal tunnel. She notes \"twinges\" in her chest (several foci) intermittently with deep breathing that worsen after eating. She denies any signs of ACS or infection and thinks it may be related to heartburn- does not take anything for this. No shortness of breath. She continues with difficulty sleeping- takes trazodone per her PCP. She notes anxiety for which she occasionally takes Xanax and is wondering about refilling this. She drinks Smooth Move tea for constipation. Eating and drinking well. She took her steroid prep at 0100 and 0700. She feels ready for chemotherapy today.    Oncology History:  The patient has had a recent episode of lower abdominal pain in early July.  She was subsequently sent to the emergency room and was found to have a large 9 cm complex ovarian mass. She was admitted to the hospital for pain control.  7/3/17:  1187  17: Exploratory laparotomy, total abdominal hysterectomy, left salpingo-oophorectomy, " cancer staging including infracolic omentectomy, bilateral pelvic & para-aortic lymphadenectomy, peritoneal biopsies, evacuation of pelvic fluid by Dr. Cole.    FINAL DIAGNOSIS:   A. LEFT OVARY AND FALLOPIAN TUBE, LEFT SALPINGO-OOPHORECTOMY:   - Ovarian clear cell carcinoma   - Background of endometriosis   - Fallopian tube with no significant histologic abnormality.   B. UTERUS, HYSTERECTOMY:   -  Inactive endometrium   -  Myometrium with adenomyosis and leiomyoma.   -  Cervix with squamous metaplasia.   C. PERITONEUM, RIGHT PELVIS, BIOPSY:   - Fibroadipose tissue, negative for malignancy.   D. LYMPH NODES, RIGHT PELVIC, DISSECTION:   - Thirteen benign lymph nodes (0/13).   E. LYMPH NODES, LEFT PELVIC, DISSECTION:   - Seven benign lymph nodes (0/7).   F. PERITONEUM, LEFT PELVIS, BIOPSY:   - Fibroadipose tissue, negative for malignancy.   G. PERITONEUM, BLADDER, BIOPSY:   - Fibroadipose tissue  with acute and chronic inflammation, negative for malignancy.   H. PERITONEUM, POSTERIOR CUL-DE-SAC, BIOPSY:   - Fibroadipose tissue, negative for malignancy.   I. PERITONEUM, LEFT PARACOLIC GUTTER, BIOPSY:   - Fibroadipose tissue, negative for malignancy.   J. LYMPH NODES, LEFT PARA-AORTIC, DISSECTION:   - Five benign lymph nodes (0/5).   K. LYMPH NODES, RIGHT PARA-AORTIC, DISSECTION:   - Two benign lymph nodes (0/2).   L. PERITONEUM, RIGHT PARACOLIC GUTTER, BIOPSY:   - Fibroadipose tissue, negative for malignancy.   M. OMENTUM, OMENTECTOMY:   - Adipose tissue with reactive changes, negative for malignancy.   COMMENT:   The final diagnosis confirms the interpretation provided intraoperatively.   Report Name: Ovary or Fallopian Tube   Status: Submitted   Part(s) Involved:   A: Ovary and fallopian tube, left   Synoptic Report:   CLINICAL   Clinical History:   - Pelvic mass   SPECIMEN   Procedure:   - Left salpingo-oophorectomy   - Hysterectomy   - Omentectomy   - Peritoneal  biopsies   Lymph Node Sampling:   - Performed    Location:   - Pelvic lymph nodes   - Para-aortic lymph nodes   Specimen Integrity:   - Left ovary   Specimen Integrity of Left Ovary:   - Capsule intact   TUMOR   Primary Tumor Site(s):   - Left ovary   Left Ovary   Tumor Size of Left Ovary: 19 cm   Histologic Type:   - Clear cell carcinoma   Tumor Extent   Ovarian Surface Involvement:   - Absent   Specimen(s)   Extent of Left Ovary:   - Involved   Extent of Left Fallopian Tube:   - Not involved   Extent of Omentum:   - Not involved   Extent of Uterus:   - Not involved   Extent of Peritoneum:   - Not involved   Peritoneal Ascitic Fluid:   - Not performed / unknown   Pleural Fluid:   - Not performed / unknown   LYMPH NODES     Number of Pelvic Lymph Nodes Examined: 20     Number of Pelvic Lymph Nodes Involved: None identified     Number of Para-aortic Lymph Nodes Examined: 7     Number of Para-Aortic Lymph Nodes Involved: None identified   STAGE (PTNM, AJCC 7TH ED.)   Primary Tumor (pT):   - Ovary   Ovarian Primary Tumor (pT):   - pT1a: Tumor limited to 1 ovary; capsule intact, no tumor on ovarian surface. No malignant cells in ascites or peritoneal washings#   Regional Lymph Nodes (pN):   - pN0: No regional lymph node metastasis      07/31/17: Dr Shaffer follow up: Discussed with the patient that given the high risk histology, as well as ruptured mass before surgery, she would be qualified at higher risk of recurrence despite early stage ovarian cancer.  Recommended adjuvant chemotherapy. Plan for carboplatin of AUC of 6 and paclitaxel of 175, m2 for 3 cycles. Referral for genetic counselor and will see her back after those 3 cycles  08/03/17: Call from patient stating she is going for a second opinion and would like chemotherapy rescheduled to week of 8/14/17.     08/16/17: Cycle #1 Carbo/Taxol.  73. Significant paclitaxel reaction.  08/30/17: C1 carboplatin/inpatient paclitaxel desensitization.   09/25/17: C2 carboplatin/paclitaxel- inpatient paclitaxel  desensitization.  pending.      Past Medical History:   Diagnosis Date     Anxiety state, unspecified     [id number]     Attention deficit disorder without mention of hyperactivity      Chronic rhinitis      Depressive disorder, not elsewhere classified      Panic disorder without agoraphobia      Pure hypercholesterolemia     Lipitor     Tachycardia, unspecified     9/1999-2/2004     Type II or unspecified type diabetes mellitus without mention of complication, not stated as uncontrolled     diagnosed 2004       Past Surgical History:   Procedure Laterality Date     HYSTERECTOMY TOTAL ABDOMINAL, BILATERAL SALPINGO-OOPHORECTOMY, NODE DISSECTION, COMBINED Left 7/7/2017    Procedure: COMBINED HYSTERECTOMY TOTAL ABDOMINAL, SALPINGO-OOPHORECTOMY, NODE DISSECTION;  Exploratory Laparotomy, Left Salpingo-Oophorectomy, Cancer Staging, Total Hysterectomy, Omentectomy, Evacuation of abdominal fluid, Lymph Node Dissection  Anesthesia Block ;  Surgeon: Serena Cole MD;  Location: [facility] OR     HYSTERECTOMY, PAP NO LONGER INDICATED  07/07/2017    Laparotomy; for ovarian cancer staging     INSERT PORT VASCULAR ACCESS Right 8/21/2017    Procedure: INSERT PORT VASCULAR ACCESS;  Single Lumen Chest Power Port;  Surgeon: Stephen Mike PA-C;  Location: [facility] OR     LYMPHADENECTOMY RETROPERITONEAL Bilateral 07/07/2017    Laparotomy; pelvics & para-aortics; for ovarian cancer staging     OMENTECTOMY  07/07/2017    Laparotomy; for ovarian cancer staging     SALPINGO OOPHORECTOMY,R/L/KAYY Right 2008    Salpingo Oophorectomy, RT     SALPINGO OOPHORECTOMY,R/L/KAYY Left 07/07/2017    Laparotomy; for ovarian cancer staging     SURGICAL HISTORY OF -       facial surgery d/t fall in 1/2002     SURGICAL HISTORY OF -       1985 removal of breast cyst     SURGICAL HISTORY OF -   2008    endometrial ablation       Current Outpatient Prescriptions   Medication     loratadine (CLARITIN) 10 MG tablet     gabapentin (NEURONTIN) 100 MG  capsule     dexamethasone (DECADRON) 4 MG tablet     order for DME     order for DME     LORazepam (ATIVAN) 1 MG tablet     rivaroxaban ANTICOAGULANT (XARELTO) 20 MG TABS tablet     traZODone (DESYREL) 50 MG tablet     order for DME     order for DME     Multiple Vitamins-Minerals (MULTIVITAMIN ADULT PO)     vitamin B complex with vitamin C (VITAMIN  B COMPLEX) TABS tablet     Lisdexamfetamine Dimesylate (VYVANSE PO)     order for DME     CALCIUM-MAGNESIUM-VITAMIN D PO     Probiotic Product (PRO-BIOTIC BLEND PO)     atorvastatin (LIPITOR) 80 MG tablet     order for DME     lisinopril (PRINIVIL/ZESTRIL) 10 MG tablet     metFORMIN (GLUCOPHAGE-XR) 500 MG 24 hr tablet     escitalopram (LEXAPRO) 20 MG tablet     EPINEPHrine (EPIPEN) 0.3 MG/0.3ML injection     fluticasone (FLONASE) 50 MCG/ACT nasal spray     Cholecalciferol (VITAMIN D) 1000 UNIT capsule     prochlorperazine (COMPAZINE) 10 MG tablet     acetaminophen (TYLENOL) 325 MG tablet     polyethylene glycol (MIRALAX/GLYCOLAX) Packet     senna-docusate (SENOKOT-S;PERICOLACE) 8.6-50 MG per tablet     No current facility-administered medications for this visit.           Allergies   Allergen Reactions     Wasps [Hornets] Anaphylaxis     Paclitaxel Difficulty breathing     Sulfa Drugs Hives       Family History   Problem Relation Age of Onset     C.A.D. Father      DIABETES Father      Hypertension Father      CEREBROVASCULAR DISEASE Father      Psychotic Disorder Father      Pancreatic Cancer Father      C.A.D. Mother      Hypertension Mother      Breast Cancer Mother      Psychotic Disorder Mother      Colon Cancer Mother      CANCER Mother      Bone cancer     Prostate Problems Brother      cleared      Psychotic Disorder Sister      x2     Neurologic Disorder Sister      Psychotic Disorder Brother      x2     Depression Brother      major depressive disorder and OCD     Anxiety Disorder Brother      MENTAL ILLNESS Brother      Psychotic Disorder Maternal Grandmother  "     ?     Psychotic Disorder Maternal Grandfather      ?     Psychotic Disorder Paternal Grandmother      Schizophernia     Psychotic Disorder Paternal Grandfather      ?     Respiratory Paternal Grandfather       of emphysema and smoking     Psychotic Disorder Sister      Other - See Comments Sister      small kidney stone      Cancer - colorectal No family hx of        Social History     Social History     Marital status: Single     Spouse name: N/A     Number of children: N/A     Years of education: N/A     Occupational History     Not on file.     Social History Main Topics     Smoking status: Never Smoker     Smokeless tobacco: Never Used     Alcohol use Yes      Comment: rarely     Drug use: No     Sexual activity: Yes     Birth control/ protection: None     Other Topics Concern     Not on file     Social History Narrative           ROS  General: + sleep disturbance. Denies fatigue, changes in weight, weakness, appetite changes, night sweats, hot flashes, fever, chills  HEENT: + hair loss. Denies headaches, visual difficulty or disturbances, spots or floaters, diplopia, masses, head injury, tinnitus, hearing loss, epistaxis, congestion, problems with teeth or gums, dysphonia, or dysphagia  Pulmonary: Denies cough, sputum, hemoptysis, shortness of breath, dyspnea on exertion, wheezing, or allergies  Cardiovascular: + chest \"twinges.\" Denies fainting, palpitations, murmurs, activity intolerance, swelling in legs, or high blood pressure  Gastrointestinal: + constipation. Denies nausea, vomiting, diarrhea, abdominal pain, bloating, heartburn, melena, hematochezia, or jaundice  Genitourinary: Denies dysuria, urinary urgency or frequency, hematuria, cloudy or malodorous urine, incontinence, repeat urinary tract infections, flank pain, pelvic pain, vaginal bleeding, vaginal discharge, or vaginal dryness  Sexual Function: Denies pain with intercourse, changes in libido, arousal difficulty, or changes in " "orgasm  Integumentary: Denies rashes, sores, changing moles, or scarring  Hematologic: Denies swollen lymph nodes, masses, easy bruising, or easy bleeding  Musculoskeletal: Denies falls, back pain, myalgias, arthralgias, stiffness, muscle weakness or muscle cramps  Neurologic: + numbness. Denies tingling, changes in memory, difficulty with walking, dizziness, seizures, or tremors  Psychiatric: + anxiety. Denies depression, nervousness, mood changes, suicidal thoughts, or difficulty concentrating  Endocrine: Denies polydipsia, polyuria, temperature intolerance, or history of thyroid disease      Physical Exam:    /76 (BP Location: Right arm, Patient Position: Chair, Cuff Size: Adult Regular)  Pulse 101  Temp 97.9  F (36.6  C) (Oral)  Ht 1.6 m (5' 3\")  Wt 82.6 kg (182 lb)  LMP 01/01/2009  SpO2 96%  Breastfeeding? No  BMI 32.24 kg/m2    CONSTITUTIONAL: Alert non-toxic appearing female in no acute distress  HEAD: Normocephalic, atraumatic  EYES: PERRLA; no scleral icterus  ENT: Oropharynx pink without lesions  NECK: Neck supple without lymphadenopathy  RESPIRATORY: Lungs clear to auscultation, no increased work of breathing noted  CV: Tachycardic, regular rhythm, S1S2, no clicks, murmurs, rubs, or gallops; bilateral lower extremities with trace edema, dorsalis pedis pulses 2+ bilaterally  GASTROINTESTINAL: Normoactive bowel sounds x4 quadrants, abdomen soft, non-distended, and non-tender to palpation without masses or organomegaly  GENITOURINARY: Not indicated  LYMPHATIC: Cervical, supraclavicular, and inguinal nodes without lymphadenopathy  MUSCULOSKELETAL: Moves all extremities, no obvious muscle wasting  NEUROLOGIC: No gross deficits, normal gait  SKIN: Appropriate color for race, warm and dry, no rashes or lesions to unclothed skin  PSYCHIATRIC: Pleasant and interactive, affect bright, speech pressured and tangential at times, makes appropriate eye contact, thought process linear      Labs:      " 9/25/2017  Inpatient (Planned)   Hemoglobin 11.7 - 15.7 g/dL 10.4 (A)   Hematocrit 35.0 - 47.0 % 32.6 (A)   Platelet Count 150 - 450 10e9/L 261   Absolute Neutrophil 1.6 - 8.3 10e9/L 4.0   Sodium 133 - 144 mmol/L 140   Potassium 3.4 - 5.3 mmol/L 4.2   Chloride 94 - 109 mmol/L 106   Carbon Dioxide 20 - 32 mmol/L 25   Urea Nitrogen 7 - 30 mg/dL 21   Creatinine 0.52 - 1.04 mg/dL 0.71   Calcium 8.5 - 10.1 mg/dL 9.4   Magnesium 1.6 - 2.3 mg/dL 2.0   Bilirubin Total 0.2 - 1.3 mg/dL 0.4   ALT 0 - 50 U/L 19   AST 0 - 45 U/L 15   Alkaline Phosphatase 40 - 150 U/L 99   Albumin 3.4 - 5.0 g/dL 3.5   Protein Total 6.8 - 8.8 g/dL 7.6   WBC 4.0 - 11.0 10e9/L 5.0    pending    Assessment/Plan:  1) Stage IC2 clear cell ovarian carcinoma: Proceed with cycle two of carboplatin/ inpatient paclitaxel desensitization. To receive total of 3 cycles followed by treatment planning with Dr. Shaffer. Report given to Amanda DIAZ on 7C and Concepcion Kohler NP of inpatient gynecologic oncology team. Reviewed signs and symptoms for when she should contact the clinic or seek additional care, including but not limited to fever, chills, inability to keep down food or fluids, nausea and vomiting not controlled with antiemetics, and diarrhea leading to dehydration. Patient to contact the clinic with any questions or concerns in the interim. Noted to be neutropenic during this past cycle- to follow up in outpatient infusion clinic for Maranda Oswald RN to coordinate this.  2) Chest pain: Mild, no signs of acute coronary syndrome, pneumonia, or PE. Agree with Hafsa that this may be due to heartburn- she denies need for intervention. Continue to monitor. Reviewed s/sxs ACS, PE, PNA and when to seek further care.  3) Numbness to fingers: Stable, not impacting function. Discussed the typical presentation of neuropathy (beginning in toes first) and that her numbness may be due to carpal tunnel but to continue to monitor this and continue  L-glutamine/vitamin B6. Recommend using wrist braces while sleeping to see if this improves the numbness in her thumb.  4) Sleep/anxiety: Worsens on days she receives steroids. Continue with trazodone per her PCP's management. Discussed that lorazepam and Xanax are both benzodiazepines and not to take these together. To see her psychology provider for anxiety management.   5) Arthralgias: Currently asymptomatic- to continue with Claritin daily and to take gabapentin 100mg PO TID the week after chemotherapy for taxane pain.  6) Genetic counseling: Risk factors have been assessed and patient does meet qualifications for genetic counseling per NCCN guidelines- will need to be addressed at an upcoming visit.  7) Health maintenance issues discussed include to follow up with PCP for routine health maintenance exams, co-morbid conditions, and non-gynecologic concerns.  8) Patient verbalized understanding of and agreement with plan    A total of 35 minutes of face to face time were spent with the patient with over 50% of that time spent in counseling, coordination of care, education, and symptom management.    MARLON Ogden, FNP-C  Family Nurse Practitioner  Gynecologic Oncology  [facility]  Pager: [phone]               no

## 2021-03-23 ENCOUNTER — NURSE TRIAGE (OUTPATIENT)
Dept: NURSING | Facility: CLINIC | Age: 66
End: 2021-03-23

## 2021-03-23 NOTE — TELEPHONE ENCOUNTER
Hafsa requests to be scheduled for a [employer] COVID vaccine.    Per , no openings at the [facility] ([address]    States she would prefer the J&J vaccine as she has allergy to PEG, had severe allergic reaction to paclitaxel in the past.    Unfortunately we do not have any wait list for the vaccines. Advised she can check MyChart from time to time to see openings.    She verbalized understanding.    Ida Delacruz RN/[facility] Nurse Advisor          Reason for Disposition    COVID-19 vaccine, Frequently Asked Questions (FAQs)    Protocols used: CORONAVIRUS (COVID-19) VACCINE QUESTIONS AND [id number] 1.3

## 2021-03-26 ASSESSMENT — ENCOUNTER SYMPTOMS
SMELL DISTURBANCE: 0
TROUBLE SWALLOWING: 0
JAUNDICE: 0
MUSCLE WEAKNESS: 0
HEMOPTYSIS: 0
SPEECH CHANGE: 0
NIGHT SWEATS: 0
EYE WATERING: 0
HYPERTENSION: 0
TINGLING: 0
DOUBLE VISION: 0
CONSTIPATION: 0
EYE REDNESS: 0
VOMITING: 0
HOT FLASHES: 0
NECK MASS: 0
SORE THROAT: 0
HYPOTENSION: 0
BACK PAIN: 1
BOWEL INCONTINENCE: 0
WEIGHT LOSS: 0
HEARTBURN: 0
MYALGIAS: 1
NECK PAIN: 0
TREMORS: 0
INSOMNIA: 0
COUGH DISTURBING SLEEP: 0
ARTHRALGIAS: 1
DECREASED LIBIDO: 0
NAUSEA: 0
DYSPNEA ON EXERTION: 0
FATIGUE: 1
RESPIRATORY PAIN: 0
BREAST MASS: 0
WEAKNESS: 0
POSTURAL DYSPNEA: 0
JOINT SWELLING: 0
BLOOD IN STOOL: 0
HOARSE VOICE: 0
MUSCLE CRAMPS: 1
ABDOMINAL PAIN: 0
DISTURBANCES IN COORDINATION: 0
MEMORY LOSS: 0
BREAST PAIN: 0
SLEEP DISTURBANCES DUE TO BREATHING: 0
ORTHOPNEA: 0
COUGH: 0
PARALYSIS: 0
SEIZURES: 0
POOR WOUND HEALING: 0
RECTAL BLEEDING: 0
RECTAL PAIN: 0
INCREASED ENERGY: 0
DECREASED APPETITE: 0
DYSURIA: 0
CHILLS: 0
TASTE DISTURBANCE: 0
EYE PAIN: 0
PALPITATIONS: 1
HALLUCINATIONS: 0
LIGHT-HEADEDNESS: 1
HEADACHES: 0
BRUISES/BLEEDS EASILY: 0
FEVER: 0
DEPRESSION: 0
DIFFICULTY URINATING: 0
LEG PAIN: 1
SNORES LOUDLY: 0
NUMBNESS: 0
DIZZINESS: 0
POLYDIPSIA: 0
ALTERED TEMPERATURE REGULATION: 0
DECREASED CONCENTRATION: 0
EXERCISE INTOLERANCE: 0
FLANK PAIN: 0
SWOLLEN GLANDS: 0
SYNCOPE: 0
POLYPHAGIA: 0
BLOATING: 0
DIARRHEA: 0
SINUS CONGESTION: 1
WHEEZING: 0
NAIL CHANGES: 0
EXTREMITY NUMBNESS: 0
SPUTUM PRODUCTION: 0
LOSS OF CONSCIOUSNESS: 0
SHORTNESS OF BREATH: 0
SINUS PAIN: 1
EYE IRRITATION: 0
PANIC: 0
SKIN CHANGES: 0
HEMATURIA: 0
WEIGHT GAIN: 1
NERVOUS/ANXIOUS: 0
STIFFNESS: 0

## 2021-03-26 NOTE — PROGRESS NOTES
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 She completed treatment with surgery and three cycles of chemotherapy on 10/16/17. She is here today for a surveillance visit.     Oncology History:  The patient has had a recent episode of lower abdominal pain in early July.  She was subsequently sent to the emergency room and was found to have a large 9 cm complex ovarian mass. She was admitted to the hospital for pain control.  7/3/17:  1187  17: Exploratory laparotomy, total abdominal hysterectomy, left salpingo-oophorectomy, cancer staging including infracolic omentectomy, bilateral pelvic & para-aortic lymphadenectomy, peritoneal biopsies, evacuation of pelvic fluid by Dr. Cole.    FINAL DIAGNOSIS:   A. LEFT OVARY AND FALLOPIAN TUBE, LEFT SALPINGO-OOPHORECTOMY:   - Ovarian clear cell carcinoma   - Background of endometriosis   - Fallopian tube with no significant histologic abnormality.   B. UTERUS, HYSTERECTOMY:   -  Inactive endometrium   -  Myometrium with adenomyosis and leiomyoma.   -  Cervix with squamous metaplasia.   C. PERITONEUM, RIGHT PELVIS, BIOPSY:   - Fibroadipose tissue, negative for malignancy.   D. LYMPH NODES, RIGHT PELVIC, DISSECTION:   - Thirteen benign lymph nodes (0/13).   E. LYMPH NODES, LEFT PELVIC, DISSECTION:   - Seven benign lymph nodes (0/7).   F. PERITONEUM, LEFT PELVIS, BIOPSY:   - Fibroadipose tissue, negative for malignancy.   G. PERITONEUM, BLADDER, BIOPSY:   - Fibroadipose tissue  with acute and chronic inflammation, negative for malignancy.   H. PERITONEUM, POSTERIOR CUL-DE-SAC, BIOPSY:   - Fibroadipose tissue, negative for malignancy.   I. PERITONEUM, LEFT PARACOLIC GUTTER, BIOPSY:   - Fibroadipose tissue, negative for malignancy.   J. LYMPH NODES, LEFT PARA-AORTIC, DISSECTION:   - Five  benign lymph nodes (0/5).   K. LYMPH NODES, RIGHT PARA-AORTIC, DISSECTION:   - Two benign lymph nodes (0/2).   L. PERITONEUM, RIGHT PARACOLIC GUTTER, BIOPSY:   - Fibroadipose tissue, negative for malignancy.   M. OMENTUM, OMENTECTOMY:   - Adipose tissue with reactive changes, negative for malignancy.   COMMENT:   The final diagnosis confirms the interpretation provided intraoperatively.   Report Name: Ovary or Fallopian Tube   Status: Submitted   Part(s) Involved:   A: Ovary and fallopian tube, left   Synoptic Report:   CLINICAL   Clinical History:   - Pelvic mass   SPECIMEN   Procedure:   - Left salpingo-oophorectomy   - Hysterectomy   - Omentectomy   - Peritoneal  biopsies   Lymph Node Sampling:   - Performed   Location:   - Pelvic lymph nodes   - Para-aortic lymph nodes   Specimen Integrity:   - Left ovary   Specimen Integrity of Left Ovary:   - Capsule intact   TUMOR   Primary Tumor Site(s):   - Left ovary   Left Ovary   Tumor Size of Left Ovary: 19 cm   Histologic Type:   - Clear cell carcinoma   Tumor Extent   Ovarian Surface Involvement:   - Absent   Specimen(s)   Extent of Left Ovary:   - Involved   Extent of Left Fallopian Tube:   - Not involved   Extent of Omentum:   - Not involved   Extent of Uterus:   - Not involved   Extent of Peritoneum:   - Not involved   Peritoneal Ascitic Fluid:   - Not performed / unknown   Pleural Fluid:   - Not performed / unknown   LYMPH NODES     Number of Pelvic Lymph Nodes Examined: 20     Number of Pelvic Lymph Nodes Involved: None identified     Number of Para-aortic Lymph Nodes Examined: 7     Number of Para-Aortic Lymph Nodes Involved: None identified   STAGE (PTNM, AJCC 7TH ED.)   Primary Tumor (pT):   - Ovary   Ovarian Primary Tumor (pT):   - pT1a: Tumor limited to 1 ovary; capsule intact, no tumor on ovarian surface. No malignant cells in ascites or peritoneal washings#   Regional Lymph Nodes (pN):   - pN0: No regional lymph node metastasis       07/31/17:   Edmund follow up: Discussed with the patient that given the high risk histology, as well as ruptured mass before surgery, she would be qualified at higher risk of recurrence despite early stage ovarian cancer.  Recommended adjuvant chemotherapy. Plan for carboplatin of AUC of 6 and paclitaxel of 175, m2 for 3 cycles. Referral for genetic counselor and will see her back after those 3 cycles  08/03/17: Call from patient stating she is going for a second opinion and would like chemotherapy rescheduled to week of 8/14/17.      08/16/17: Cycle #1 Carbo/Taxol.  73. Significant paclitaxel reaction.  08/30/17: C1 carboplatin/inpatient paclitaxel desensitization.   09/25/17: C2 carboplatin/paclitaxel- inpatient paclitaxel desensitization.  15.  10/16/17: C3 carboplatin/paclitaxel- switched to docetaxel given her neuropathy.  10.  11/14/17:  8. CT CAP:  IMPRESSION:   1. Post surgical changes of hysterectomy and bilateral  salpingo-oophorectomy. Nodular soft tissue density in the posterior  cul-de-sac along with ill-defined nodular thickening in the left  pelvis, suspicious for residual disease or metastatic deposits.   2. There is a 3 cm mass in the superior pole of the right kidney,  possibly extending into the renal hilum. Represents RCC until proven  otherwise. Consider renal mass protocol CT to assess renal vein  involvement.  3. Stable sub-4 mm pulmonary nodules, continued attention on  follow-up.     11/16/17: CT renal mass protocol  IMPRESSION:  1. Arterially enhancing mass measuring 3.6 x 2.1 x 2.2 cm mass arising  from superior posterior of the right kidney, this is renal cell  carcinoma until proven otherwise.  2. Stable fat-containing umbilical hernia.     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 Margins negative. Three month surveillance plan with [facility].     2/26/18:  14     5/22/18:  11      8/23/18:  11     12/12/18:   10     3/8/19:  12     6/19/19:  13     9/23/19:  12    11/8/19: CT Chest:   IMPRESSION:   1. Stable, sub-4 mm pulmonary nodules as above.  2. Stable hypodense, subcentimeter nodules in the thyroid.     3/27/20:  14    10/1/20:  15    3/29/21:  10          Today Hafsa comes to the clinic feeling well. She denies any gynecological concerns. She does report that she sustained a fingernail cut to her left vulva two weeks ago which bled a small amount for three days. She denies swelling, bleeding, discharge, pain, fevers. She thinks it is resolved now. She takes nitrofurantoin as post-coital for UTI prophylaxis. She is on Xarelto daily for hx of DVT. She still has her port in place, having this flushed infrequently- does not want it out yet due to fear of recurrence and the possible need for the port in the future. She is sexually active without issue; is using water based lubricant. She walks her dog daily for ten minutes for activity. She has lost some weight and is trying to eat better and incorporate more exercise.    She denies any vaginal bleeding, no changes in her bowel or bladder habits, no nausea/emesis, and no difficulties eating. She denies any abdominal discomfort/bloating, no fevers or chills, and no chest pain or shortness of breath. She up to date on breast screening (high risk screening through [facility]). She is due to see urology and would like to change over to the [facility] for this. She did not follow up last year for this but is ready to now. She is having bladder incontinence with standing and sneezing. She has not been doing kegels. She wants to be referred to pelvic floor physical therapy now.  She is up to date on her colonoscopy (1/6/20). She is due to see her PCP. She has not had COVID vaccines. She is trying to get the Stewart and Stewart shot due to allergy to PEG polyethylene glycol. She has spoken with an allergist about the COVID vaccine and  her history of this. States that it is hard to get tested for this allergy. She has ordered an Epinephrine pen for this and is awaiting insurance to approve this purchase.             Review of Systems     Constitutional:  Positive for weight gain, fatigue and height loss. Negative for fever, chills, weight loss, decreased appetite, night sweats, recent stressors, post-operative complications, incisional pain, hallucinations, increased energy, hyperactivity and confused.   HENT:  Positive for nosebleeds, sinus pain and sinus congestion. Negative for ear pain, hearing loss, tinnitus, trouble swallowing, hoarse voice, mouth sores, sore throat, ear discharge, tooth pain, gum tenderness, taste disturbance, smell disturbance, hearing aid, bleeding gums, dry mouth and neck mass.    Eyes:  Negative for double vision, pain, redness, eye pain, decreased vision, eye watering, eye bulging, eye dryness, flashing lights, spots, floaters, strabismus, tunnel vision, jaundice and eye irritation.   Respiratory:   Negative for cough, hemoptysis, sputum production, shortness of breath, wheezing, sleep disturbances due to breathing, snores loudly, respiratory pain, dyspnea on exertion, cough disturbing sleep and postural dyspnea.    Cardiovascular:  Positive for palpitations, leg pain and light-headedness. Negative for chest pain, dyspnea on exertion, orthopnea, fingers/toes turn blue, hypertension, hypotension, syncope, history of heart murmur, pacemaker, few scattered varicosities, sleep disturbances due to breathing, exercise intolerance and edema.   Gastrointestinal:  Negative for heartburn, nausea, vomiting, abdominal pain, diarrhea, constipation, blood in stool, melena, rectal pain, bloating, hemorrhoids, bowel incontinence, jaundice, rectal bleeding, coffee ground emesis and change in stool.   Genitourinary:  Positive for bladder incontinence. Negative for dysuria, urgency, hematuria, flank pain, vaginal discharge, difficulty  urinating, genital sores, dyspareunia, decreased libido, nocturia, voiding less frequently, arousal difficulty, abnormal vaginal bleeding, excessive menstruation, menstrual changes, hot flashes, vaginal dryness and postmenopausal bleeding.   Musculoskeletal:  Positive for myalgias, back pain, arthralgias and muscle cramps. Negative for joint swelling, stiffness, neck pain, bone pain, muscle weakness and fracture.   Skin:  Negative for nail changes, itching, poor wound healing, rash, hair changes, skin changes, acne, warts, poor wound healing, scarring, flaky skin, Raynaud's phenomenon, sensitivity to sunlight and skin thickening.   Neurological:  Positive for light-headedness. Negative for dizziness, tingling, tremors, speech change, seizures, loss of consciousness, weakness, numbness, headaches, disturbances in coordination, extremity numbness, memory loss, difficulty walking and paralysis.   Endo/Heme:  Negative for anemia, swollen glands and bruises/bleeds easily.   Psychiatric/Behavioral:  Negative for depression, hallucinations, memory loss, decreased concentration, mood swings and panic attacks.    Breast:  Negative for breast discharge, breast mass, breast pain and nipple retraction.   Endocrine:  Negative for altered temperature regulation, polyphagia, polydipsia, unwanted hair growth and change in facial hair.      Past Medical History:    Past Medical History:   Diagnosis Date     Anxiety state, unspecified     [id number]     Arthritis 2014    vague, gradual, not treated really, knees feel it     At high risk for breast cancer 09/27/2019    30.3% lifetime risk by SHERON model     Attention deficit disorder without mention of hyperactivity      Cancer (H) 7/2017 and 1/2018    ovarian and kidney cancers, both treated well     Chronic rhinitis      Depressive disorder lifetime    plus Anxiety plus ADD. Escitalipram, therapy, ADD meds maybe     Depressive disorder, not elsewhere classified      Heart disease 1999     tachycardia and high cholesterol, managed     History of blood transfusion 7/2017    while in hospital for ovarian cancer     Hypertension 1999    tho BP was too LOW last week. past 12 years Generally OK     Panic disorder without agoraphobia      Pure hypercholesterolemia     Lipitor     Tachycardia      Tachycardia, unspecified     9/1999-2/2004     Type II or unspecified type diabetes mellitus without mention of complication, not stated as uncontrolled     diagnosed 2004         Past Surgical History:    Past Surgical History:   Procedure Laterality Date     AS REMV KIDNEY,RADICAL Right      BIOPSY  7/2017 and 2/2018    ovarian and kidney cancer biopsies. also 1987 breast benign     BREAST SURGERY  1987    date unsure. benign breast cyst removed     CATARACT IOL, RT/LT Left 05/18/2018     CATARACT IOL, RT/LT Right 07/1318     COLONOSCOPY  2008 and 2013    polyps removed. Next due fall 2018     HYSTERECTOMY TOTAL ABDOMINAL, BILATERAL SALPINGO-OOPHORECTOMY, NODE DISSECTION, COMBINED Left 7/7/2017    Procedure: COMBINED HYSTERECTOMY TOTAL ABDOMINAL, SALPINGO-OOPHORECTOMY, NODE DISSECTION;  Exploratory Laparotomy, Left Salpingo-Oophorectomy, Cancer Staging, Total Hysterectomy, Omentectomy, Evacuation of abdominal fluid, Lymph Node Dissection  Anesthesia Block ;  Surgeon: Serena Cole MD;  Location: [facility] OR     HYSTERECTOMY, PAP NO LONGER INDICATED  07/07/2017    Laparotomy; for ovarian cancer staging     HYSTERECTOMY, PAP NO LONGER INDICATED       INSERT PORT VASCULAR ACCESS Right 8/21/2017    Procedure: INSERT PORT VASCULAR ACCESS;  Single Lumen Chest Power Port;  Surgeon: Stephen Mike PA-C;  Location: [facility] OR     LYMPHADENECTOMY RETROPERITONEAL Bilateral 07/07/2017    Laparotomy; pelvics & para-aortics; for ovarian cancer staging     OMENTECTOMY  07/07/2017    Laparotomy; for ovarian cancer staging     ORTHOPEDIC SURGERY  1/2002    broken left jaw due to fall, 4 fractures, titanium plates      PHACOEMULSIFICATION CLEAR CORNEA WITH STANDARD INTRAOCULAR LENS IMPLANT Right 7/13/2018    Procedure: PHACOEMULSIFICATION CLEAR CORNEA WITH STANDARD INTRAOCULAR LENS IMPLANT;  RIght Eye Phacoemulsification Clear Cornea with Standard Intraocular Lens Placement ;  Surgeon: Mary Jo Massey MD;  Location: [facility] OR     PHACOEMULSIFICATION CLEAR CORNEA WITH TORIC INTRAOCULAR LENS IMPLANT Left 5/18/2018    Procedure: PHACOEMULSIFICATION CLEAR CORNEA WITH TORIC INTRAOCULAR LENS IMPLANT;  Left Eye Phacoemulsification with Toric Lens;  Surgeon: Mary Jo Massey MD;  Location: [facility] OR     SALPINGO OOPHORECTOMY,R/L/KAYY Right 2008    Salpingo Oophorectomy, RT     SALPINGO OOPHORECTOMY,R/L/KAYY Left 07/07/2017    Laparotomy; for ovarian cancer staging     SURGICAL HISTORY OF -       facial surgery d/t fall in 1/2002     SURGICAL HISTORY OF -       1985 removal of breast cyst     SURGICAL HISTORY OF -   2008    endometrial ablation     YAG CAPSULOTOMY OD (RIGHT EYE)  10/22/2018         Health Maintenance Due   Topic Date Due     COVID-19 Vaccine (1) Never done     ZOSTER IMMUNIZATION (1 of 2) Never done     EYE EXAM  11/28/2019     DIABETIC FOOT EXAM  10/01/2020     BMP  03/27/2021       Current Medications:     Current Outpatient Medications   Medication Sig Dispense Refill     Acetaminophen (TYLENOL PO) Take 500 mg by mouth 2 tabs prn pain (monthly)       atorvastatin (LIPITOR) 80 MG tablet TAKE 1 TABLET(80 MG) BY MOUTH DAILY 90 tablet 3     B Complex Vitamins (VITAMIN B-COMPLEX PO) Take 50 mg by mouth        CALCIUM-MAGNESIUM-VITAMIN D PO Take 2 tablets by mouth daily       cephALEXin (KEFLEX) 500 MG capsule Take 1 capsule (500 mg) by mouth 3 times daily 21 capsule 0     Cholecalciferol (VITAMIN D) 1000 UNIT capsule Take 2,000 Units by mouth daily        DiphenhydrAMINE HCl (BENADRYL PO) Take 50 mg by mouth daily as needed (monthly)       EPINEPHrine (EPIPEN/ADRENACLICK/OR ANY BX GENERIC EQUIV) 0.3 MG/0.3ML injection 2-pack Inject  0.3 mLs (0.3 mg) into the muscle once as needed for anaphylaxis 0.3 mL PRN     escitalopram (LEXAPRO) 20 MG tablet TAKE 1 TABLET(20 MG) BY MOUTH DAILY 90 tablet 1     fluticasone (FLONASE) 50 MCG/ACT nasal spray SHAKE LIQUID AND USE 2 SPRAYS IN EACH NOSTRIL DAILY 48 g 3     LORazepam (ATIVAN) 1 MG tablet Take 1 tablet (1 mg) by mouth every 6 hours as needed (nausea/vomiting, anxiety or sleep) 30 tablet 1     Magnesium Hydroxide (MAGNESIA PO) Take 500 mg by mouth       metFORMIN (GLUCOPHAGE-XR) 500 MG 24 hr tablet TAKE 2 TABLETS BY MOUTH EVERY DAY WITH THE EVENING MEAL 180 tablet 1     Multiple Vitamins-Minerals (MULTIVITAMIN ADULT PO) Take 1 tablet by mouth daily       nitroFURantoin macrocrystal-monohydrate (MACROBID) 100 MG capsule Take one capsule after intercourse as needed 30 capsule PRN     Probiotic Product (PRO-BIOTIC BLEND PO) Take 1 capsule by mouth daily Enzymatic Therapy-probiotic pearls       rivaroxaban ANTICOAGULANT (XARELTO) 20 MG TABS tablet Take 1 tablet (20 mg) by mouth daily (with dinner) 90 tablet 4     tamoxifen (NOLVADEX) 10 MG tablet Cut each pill in 1/2 with pill cutter to ensure 5 mg daily dose. 45 tablet 3     traZODone (DESYREL) 50 MG tablet TAKE 1 TABLET  BY MOUTH EVERY NIGHT AS NEEDED FOR SLEEP 180 tablet PRN     tamoxifen (NOLVADEX) 10 MG tablet Take 0.5 tablets (5 mg) by mouth daily Cut each pill in 1/2 with pill cutter to ensure 5 mg daily dose. 45 tablet 1         Allergies:        Allergies   Allergen Reactions     Paclitaxel Anaphylaxis and Difficulty breathing     Wasps [Hornets] Anaphylaxis     Yellow Hornet Venom [Hornet Venom] Anaphylaxis and Swelling     Yellow Jacket Venom [Venomil Honey Bee] Anaphylaxis and Swelling     Sulfa Drugs Hives and Rash        Social History:     Social History     Tobacco Use     Smoking status: Never Smoker     Smokeless tobacco: Never Used   Substance Use Topics     Alcohol use: Yes     Comment: Very infrequent, a bit of wine or beer 2x per  month maybe       History   Drug Use     Types: Marijuana     Comment: infrequent, for celebrations only, maybe 8x per year         Family History:     The patient's family history is notable for:     Family History   Problem Relation Age of Onset     C.A.D. Father      Diabetes Father         Later in his life, in his 70s     Hypertension Father      Cerebrovascular Disease Father         1984 or      Psychotic Disorder Father      Pancreatic Cancer Father      Coronary Artery Disease Father         diagnosed in his late 50s (age)     Hyperlipidemia Father         treated w statins     Other Cancer Father         pancreatic, ,  of this     Depression Father      Mental Illness Father         likely PTSD and depression     Obesity Father      C.A.D. Mother      Hypertension Mother      Breast Cancer Mother         2 times,  and ?     Psychotic Disorder Mother      Colon Cancer Mother         ?     Cancer Mother         Bone cancer     Coronary Artery Disease Mother         diagnosed in her late 70s (age)     Hyperlipidemia Mother         treated w. statins     Other Cancer Mother         bone/marrow, ,  of this     Depression Mother      Anxiety Disorder Mother      Mental Illness Mother         various undiagnosed types, but THERE     Obesity Mother      Prostate Problems Brother         cleared      Hypertension Brother      Hyperlipidemia Brother         unsure if treated w statins     Depression Brother      Anxiety Disorder Brother      Mental Illness Brother         undiagnosed but THERE     Obesity Brother      Psychotic Disorder Sister         x2     Neurologic Disorder Sister      Hypertension Sister      Hyperlipidemia Sister         treated w statins     Depression Sister      Anxiety Disorder Sister      Obesity Sister      Psychotic Disorder Brother         x2     Depression Brother         major depressive disorder and OCD     Anxiety Disorder Brother      Mental  "Illness Brother         not sure of diagnoses, treated     Hypertension Brother      Hyperlipidemia Brother      Psychotic Disorder Maternal Grandmother         ?     Coronary Artery Disease Maternal Grandmother          of heart attack age 84?     Depression Maternal Grandmother      Psychotic Disorder Maternal Grandfather         ?     Psychotic Disorder Paternal Grandmother         Schizophernia     Coronary Artery Disease Paternal Grandmother          of heart attack, age 85?     Depression Paternal Grandmother         severe, but recovered     Mental Illness Paternal Grandmother         possible bipolar or other     Psychotic Disorder Paternal Grandfather         ?     Respiratory Paternal Grandfather          of emphysema and smoking     Psychotic Disorder Sister      Other - See Comments Sister         small kidney stone      Hypertension Sister      Hyperlipidemia Sister         treated w statins     Depression Sister      Anxiety Disorder Sister      Cancer - colorectal No family hx of      Glaucoma No family hx of      Macular Degeneration No family hx of          Physical Exam:     /80   Pulse 105   Temp 98.7  F (37.1  C) (Oral)   Resp 18   Ht 1.598 m (5' 2.91\")   Wt 94.3 kg (208 lb)   LMP 2009   SpO2 99%   BMI 36.95 kg/m    Body mass index is 36.95 kg/m .    General Appearance: healthy and alert, no distress     HEENT: no thyromegaly, no palpable nodules or masses        Cardiovascular: regular rate and rhythm, no gallops, rubs or murmurs     Respiratory: lungs clear, no rales, rhonchi or wheezes    Musculoskeletal: extremities non tender with 1+ BL LE edema     Breast:  No chest deformity, asymmetry. Normal contours. No nodules, masses, tenderness, or axillary adenopathy. No nipple discharge.     Skin:  no lesions or rashes    Neurological: normal gait, no gross defects     Psychiatric: appropriate mood and affect                               Hematological: normal " cervical, supraclavicular and inguinal lymph nodes     Gastrointestinal:       abdomen soft, non-tender, non-distended, no organomegaly or masses    Genitourinary: External genitalia and urethral meatus appears normal. Small scar on left inner vulva well healed without drainage, erythema, tenderness. Vagina is smooth without nodularity or masses. Cervix surgically absent.  Bimanual exam reveal no masses, nodularity or fullness. Recto-vaginal exam confirms these findings.      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 She completed treatment with surgery and three cycles of chemotherapy on 10/16/17. She is here today for a surveillance visit.      A total of 30 minutes was spent with the patient, over 50% minutes of which were spent in counseling the patient and/or treatment planning.      Plan:     1.)         ASHISH on exam and  stable. Breast exam completed per patient request. Three years out from treatment, may continue surveillance every six months x3 years (through 10/2022) then annually thereafter with  and pelvic/rectal exam. Continue q6wk port flushes. Encouraged discussion with allergist and PCP regarding possible allergy to COVID vaccine. Right now per patient, allergist is recommending J&J vaccine which she is trying to schedule herself. Reviewed signs and symptoms for when she should contact the clinic or seek additional care. Patient to contact the clinic with any questions or concerns in the interim.     2.) Genetic testing: Hafsa is negative for mutations in the AIP, ALK, APC, COSTA, BAP1, BARD1, BLM, BRCA1, BRCA2, BRIP1, BMPR1A, CDH1, CDK4, CDKN1B, CDKN2A, CHEK2, DICER1, EPCAM, FANCC, FH, FLCN, GALNT12, GREM1, HOXB13, MAX, MEN1, MET, MITF, MLH1, MRE11A, MSH2, MSH6, MUTYH, NBN, NF1, NF2, PALB2, PHOX2B, PMS2, POLD1, POLE, POT1, HFUTI9Q, PTCH1, PTEN, RAD50, RAD51C, RAD51D, RB1, RET, SDHA, SDHAF2, SDHB, SDHC, SDHD, SMAD4,  SMARCA4, SMARCB1, SMARCE1, STK11, SUFU, RUFL505, TP53, TSC1, TSC2, VHL, and XRCC2 genes. No mutations were found in any of the 67 genes analyzed. High risk breast screening. Sees [facility]. On low dose tamoxifen since 10/10/2019 for prevention of breast cancer.     3.) Labs and/or tests ordered include:  Port flushes every 6 weeks; Ca 125 and port flush with NP visit in six months   4.) Health maintenance issues addressed today include annual health maintenance and non-gynecologic issues with PCP. She is due to see PCP and will call to schedule this appt. She will follow up with Pelvic floor PT and Urology here at the .     5.)        Lung nodules: stable for two years on CT considered statistically benign and no further imaging needed unless new symptoms.     6.)        Urinary incontinence: stress and urge. Will refer to pelvic floor physical therapy for this. She is aware to start incorporating kegel exercises into her daily routine. We discussed a urogynecology referral but she would like to start with pelvic floor PT first. She denies any s/s of UTI. She would like to establish care with Dr. 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     7.)        Referrals: referral for Urology (Dr. Gregorio) at the [facility], referral for pelvic floor physical therapy placed again 4/1/2021        Iris Dalton, MSN, NP-BC  Women's Health Nurse Practitioner  Division of Gynecologic Oncology        CC  Patient Care Team:  Morelia Ayon NP as PCP - Karla Perez RN as Continuity Care Coordinator (Gynecologic Oncology)  Nabeel Dick MD as MD (Urology)  Javier Gregorio MD as MD (Urology)  Lola Vasquez, PhD LP as Psychologist (Psychology)  Iris Dalton, MARLON CNP as Assigned OBGYN Provider  Jarod Shaffer MD as Assigned Cancer Care Provider  Morelia Ayon NP as Assigned PCP

## 2021-03-29 ENCOUNTER — ANCILLARY PROCEDURE (OUTPATIENT)
Dept: MRI IMAGING | Facility: CLINIC | Age: 66
End: 2021-03-29
Attending: CLINICAL NURSE SPECIALIST
Payer: COMMERCIAL

## 2021-03-29 DIAGNOSIS — C56.9 MALIGNANT NEOPLASM OF OVARY, UNSPECIFIED LATERALITY (H): ICD-10-CM

## 2021-03-29 DIAGNOSIS — Z91.89 AT HIGH RISK FOR BREAST CANCER: ICD-10-CM

## 2021-03-29 DIAGNOSIS — Z12.39 BREAST CANCER SCREENING, HIGH RISK PATIENT: ICD-10-CM

## 2021-03-29 LAB — CANCER AG125 SERPL-ACNC: 10 U/ML (ref 0–30)

## 2021-03-29 PROCEDURE — 77049 MRI BREAST C-+ W/CAD BI: CPT | Performed by: STUDENT IN AN ORGANIZED HEALTH CARE EDUCATION/TRAINING PROGRAM

## 2021-03-29 PROCEDURE — 86304 IMMUNOASSAY TUMOR CA 125: CPT | Performed by: OBSTETRICS & GYNECOLOGY

## 2021-03-29 PROCEDURE — 36591 DRAW BLOOD OFF VENOUS DEVICE: CPT

## 2021-03-29 PROCEDURE — 250N000011 HC RX IP 250 OP 636: Performed by: OBSTETRICS & GYNECOLOGY

## 2021-03-29 PROCEDURE — A9585 GADOBUTROL INJECTION: HCPCS | Performed by: STUDENT IN AN ORGANIZED HEALTH CARE EDUCATION/TRAINING PROGRAM

## 2021-03-29 RX ORDER — HEPARIN SODIUM (PORCINE) LOCK FLUSH IV SOLN 100 UNIT/ML 100 UNIT/ML
5 SOLUTION INTRAVENOUS ONCE
Status: COMPLETED | OUTPATIENT
Start: 2021-03-29 | End: 2021-03-29

## 2021-03-29 RX ORDER — GADOBUTROL 604.72 MG/ML
10 INJECTION INTRAVENOUS ONCE
Status: COMPLETED | OUTPATIENT
Start: 2021-03-29 | End: 2021-03-29

## 2021-03-29 RX ADMIN — SODIUM CHLORIDE, PRESERVATIVE FREE 5 ML: 5 INJECTION INTRAVENOUS at 16:20

## 2021-03-29 RX ADMIN — GADOBUTROL 10 ML: 604.72 INJECTION INTRAVENOUS at 17:52

## 2021-03-29 NOTE — NURSING NOTE
Chief Complaint   Patient presents with     Port Draw     Labs drawn from port by RN in lab.      Port accessed with 20 gauge gripper needle by RN in lab.  Port flushed with saline and heparin.  .    Coni Diaz RN

## 2021-04-01 ENCOUNTER — ONCOLOGY VISIT (OUTPATIENT)
Dept: ONCOLOGY | Facility: CLINIC | Age: 66
End: 2021-04-01
Attending: OBSTETRICS & GYNECOLOGY
Payer: COMMERCIAL

## 2021-04-01 VITALS
HEIGHT: 63 IN | BODY MASS INDEX: 36.86 KG/M2 | TEMPERATURE: 98.7 F | HEART RATE: 105 BPM | OXYGEN SATURATION: 99 % | DIASTOLIC BLOOD PRESSURE: 80 MMHG | WEIGHT: 208 LBS | SYSTOLIC BLOOD PRESSURE: 124 MMHG | RESPIRATION RATE: 18 BRPM

## 2021-04-01 DIAGNOSIS — C56.9 MALIGNANT NEOPLASM OF OVARY, UNSPECIFIED LATERALITY (H): Primary | ICD-10-CM

## 2021-04-01 DIAGNOSIS — N39.46 MIXED STRESS AND URGE URINARY INCONTINENCE: ICD-10-CM

## 2021-04-01 DIAGNOSIS — Z91.89 AT HIGH RISK FOR BREAST CANCER: Primary | ICD-10-CM

## 2021-04-01 DIAGNOSIS — N81.89 PELVIC FLOOR WEAKNESS IN FEMALE: ICD-10-CM

## 2021-04-01 DIAGNOSIS — D17.71 ANGIOMYOLIPOMA OF KIDNEY: ICD-10-CM

## 2021-04-01 PROCEDURE — 99214 OFFICE O/P EST MOD 30 MIN: CPT | Performed by: OBSTETRICS & GYNECOLOGY

## 2021-04-01 PROCEDURE — G0463 HOSPITAL OUTPT CLINIC VISIT: HCPCS

## 2021-04-01 RX ORDER — TAMOXIFEN CITRATE 10 MG/1
TABLET ORAL
Qty: 45 TABLET | Refills: 3 | Status: SHIPPED | OUTPATIENT
Start: 2021-04-01 | End: 2021-10-08

## 2021-04-01 ASSESSMENT — MIFFLIN-ST. JEOR: SCORE: 1446.22

## 2021-04-01 ASSESSMENT — PAIN SCALES - GENERAL: PAINLEVEL: NO PAIN (0)

## 2021-04-01 NOTE — LETTER
2021         RE: Hafsa Corona  [address]  [place] [address]        Dear Colleague,    Thank you for referring your patient, Hafsa Corona, to the [facility]. Please see a copy of my visit note below.                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 She completed treatment with surgery and three cycles of chemotherapy on 10/16/17. She is here today for a surveillance visit.     Oncology History:  The patient has had a recent episode of lower abdominal pain in early July.  She was subsequently sent to the emergency room and was found to have a large 9 cm complex ovarian mass. She was admitted to the hospital for pain control.  7/3/17:  1187  17: Exploratory laparotomy, total abdominal hysterectomy, left salpingo-oophorectomy, cancer staging including infracolic omentectomy, bilateral pelvic & para-aortic lymphadenectomy, peritoneal biopsies, evacuation of pelvic fluid by Dr. Cole.    FINAL DIAGNOSIS:   A. LEFT OVARY AND FALLOPIAN TUBE, LEFT SALPINGO-OOPHORECTOMY:   - Ovarian clear cell carcinoma   - Background of endometriosis   - Fallopian tube with no significant histologic abnormality.   B. UTERUS, HYSTERECTOMY:   -  Inactive endometrium   -  Myometrium with adenomyosis and leiomyoma.   -  Cervix with squamous metaplasia.   C. PERITONEUM, RIGHT PELVIS, BIOPSY:   - Fibroadipose tissue, negative for malignancy.   D. LYMPH NODES, RIGHT PELVIC, DISSECTION:   - Thirteen benign lymph nodes (0/13).   E. LYMPH NODES, LEFT PELVIC, DISSECTION:   - Seven benign lymph nodes (0/7).   F. PERITONEUM, LEFT PELVIS, BIOPSY:   - Fibroadipose tissue, negative for malignancy.   G. PERITONEUM, BLADDER, BIOPSY:   - Fibroadipose tissue  with acute and chronic inflammation, negative for  malignancy.   H. PERITONEUM, POSTERIOR CUL-DE-SAC, BIOPSY:   - Fibroadipose tissue, negative for malignancy.   I. PERITONEUM, LEFT PARACOLIC GUTTER, BIOPSY:   - Fibroadipose tissue, negative for malignancy.   J. LYMPH NODES, LEFT PARA-AORTIC, DISSECTION:   - Five benign lymph nodes (0/5).   K. LYMPH NODES, RIGHT PARA-AORTIC, DISSECTION:   - Two benign lymph nodes (0/2).   L. PERITONEUM, RIGHT PARACOLIC GUTTER, BIOPSY:   - Fibroadipose tissue, negative for malignancy.   M. OMENTUM, OMENTECTOMY:   - Adipose tissue with reactive changes, negative for malignancy.   COMMENT:   The final diagnosis confirms the interpretation provided intraoperatively.   Report Name: Ovary or Fallopian Tube   Status: Submitted   Part(s) Involved:   A: Ovary and fallopian tube, left   Synoptic Report:   CLINICAL   Clinical History:   - Pelvic mass   SPECIMEN   Procedure:   - Left salpingo-oophorectomy   - Hysterectomy   - Omentectomy   - Peritoneal  biopsies   Lymph Node Sampling:   - Performed   Location:   - Pelvic lymph nodes   - Para-aortic lymph nodes   Specimen Integrity:   - Left ovary   Specimen Integrity of Left Ovary:   - Capsule intact   TUMOR   Primary Tumor Site(s):   - Left ovary   Left Ovary   Tumor Size of Left Ovary: 19 cm   Histologic Type:   - Clear cell carcinoma   Tumor Extent   Ovarian Surface Involvement:   - Absent   Specimen(s)   Extent of Left Ovary:   - Involved   Extent of Left Fallopian Tube:   - Not involved   Extent of Omentum:   - Not involved   Extent of Uterus:   - Not involved   Extent of Peritoneum:   - Not involved   Peritoneal Ascitic Fluid:   - Not performed / unknown   Pleural Fluid:   - Not performed / unknown   LYMPH NODES     Number of Pelvic Lymph Nodes Examined: 20     Number of Pelvic Lymph Nodes Involved: None identified     Number of Para-aortic Lymph Nodes Examined: 7     Number of Para-Aortic Lymph Nodes Involved: None identified   STAGE (PTNM, AJCC 7TH ED.)   Primary Tumor (pT):   - Ovary    Ovarian Primary Tumor (pT):   - pT1a: Tumor limited to 1 ovary; capsule intact, no tumor on ovarian surface. No malignant cells in ascites or peritoneal washings#   Regional Lymph Nodes (pN):   - pN0: No regional lymph node metastasis       07/31/17: Dr Shaffer follow up: Discussed with the patient that given the high risk histology, as well as ruptured mass before surgery, she would be qualified at higher risk of recurrence despite early stage ovarian cancer.  Recommended adjuvant chemotherapy. Plan for carboplatin of AUC of 6 and paclitaxel of 175, m2 for 3 cycles. Referral for genetic counselor and will see her back after those 3 cycles  08/03/17: Call from patient stating she is going for a second opinion and would like chemotherapy rescheduled to week of 8/14/17.      08/16/17: Cycle #1 Carbo/Taxol.  73. Significant paclitaxel reaction.  08/30/17: C1 carboplatin/inpatient paclitaxel desensitization.   09/25/17: C2 carboplatin/paclitaxel- inpatient paclitaxel desensitization.  15.  10/16/17: C3 carboplatin/paclitaxel- switched to docetaxel given her neuropathy.  10.  11/14/17:  8. CT CAP:  IMPRESSION:   1. Post surgical changes of hysterectomy and bilateral  salpingo-oophorectomy. Nodular soft tissue density in the posterior  cul-de-sac along with ill-defined nodular thickening in the left  pelvis, suspicious for residual disease or metastatic deposits.   2. There is a 3 cm mass in the superior pole of the right kidney,  possibly extending into the renal hilum. Represents RCC until proven  otherwise. Consider renal mass protocol CT to assess renal vein  involvement.  3. Stable sub-4 mm pulmonary nodules, continued attention on  follow-up.     11/16/17: CT renal mass protocol  IMPRESSION:  1. Arterially enhancing mass measuring 3.6 x 2.1 x 2.2 cm mass arising  from superior posterior of the right kidney, this is renal cell  carcinoma until proven otherwise.  2. Stable fat-containing  umbilical hernia.     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 Margins negative. Three month surveillance plan with [facility].     2/26/18:  14     5/22/18:  11      8/23/18:  11     12/12/18:  10     3/8/19:  12     6/19/19:  13     9/23/19:  12    11/8/19: CT Chest:   IMPRESSION:   1. Stable, sub-4 mm pulmonary nodules as above.  2. Stable hypodense, subcentimeter nodules in the thyroid.     3/27/20:  14    10/1/20:  15    3/29/21:  10          Today Hafsa comes to the clinic feeling well. She denies any gynecological concerns. She does report that she sustained a fingernail cut to her left vulva two weeks ago which bled a small amount for three days. She denies swelling, bleeding, discharge, pain, fevers. She thinks it is resolved now. She takes nitrofurantoin as post-coital for UTI prophylaxis. She is on Xarelto daily for hx of DVT. She still has her port in place, having this flushed infrequently- does not want it out yet due to fear of recurrence and the possible need for the port in the future. She is sexually active without issue; is using water based lubricant. She walks her dog daily for ten minutes for activity. She has lost some weight and is trying to eat better and incorporate more exercise.    She denies any vaginal bleeding, no changes in her bowel or bladder habits, no nausea/emesis, and no difficulties eating. She denies any abdominal discomfort/bloating, no fevers or chills, and no chest pain or shortness of breath. She up to date on breast screening (high risk screening through [facility]). She is due to see urology and would like to change over to the [facility] for this. She did not follow up last year for this but is ready to now. She is having bladder incontinence with standing and sneezing. She has not been doing kegels. She wants to be referred to pelvic floor physical therapy now.  She is up  to date on her colonoscopy (1/6/20). She is due to see her PCP. She has not had COVID vaccines. She is trying to get the Stewart and Stewart shot due to allergy to PEG polyethylene glycol. She has spoken with an allergist about the COVID vaccine and her history of this. States that it is hard to get tested for this allergy. She has ordered an Epinephrine pen for this and is awaiting insurance to approve this purchase.             Review of Systems     Constitutional:  Positive for weight gain, fatigue and height loss. Negative for fever, chills, weight loss, decreased appetite, night sweats, recent stressors, post-operative complications, incisional pain, hallucinations, increased energy, hyperactivity and confused.   HENT:  Positive for nosebleeds, sinus pain and sinus congestion. Negative for ear pain, hearing loss, tinnitus, trouble swallowing, hoarse voice, mouth sores, sore throat, ear discharge, tooth pain, gum tenderness, taste disturbance, smell disturbance, hearing aid, bleeding gums, dry mouth and neck mass.    Eyes:  Negative for double vision, pain, redness, eye pain, decreased vision, eye watering, eye bulging, eye dryness, flashing lights, spots, floaters, strabismus, tunnel vision, jaundice and eye irritation.   Respiratory:   Negative for cough, hemoptysis, sputum production, shortness of breath, wheezing, sleep disturbances due to breathing, snores loudly, respiratory pain, dyspnea on exertion, cough disturbing sleep and postural dyspnea.    Cardiovascular:  Positive for palpitations, leg pain and light-headedness. Negative for chest pain, dyspnea on exertion, orthopnea, fingers/toes turn blue, hypertension, hypotension, syncope, history of heart murmur, pacemaker, few scattered varicosities, sleep disturbances due to breathing, exercise intolerance and edema.   Gastrointestinal:  Negative for heartburn, nausea, vomiting, abdominal pain, diarrhea, constipation, blood in stool, melena, rectal pain,  bloating, hemorrhoids, bowel incontinence, jaundice, rectal bleeding, coffee ground emesis and change in stool.   Genitourinary:  Positive for bladder incontinence. Negative for dysuria, urgency, hematuria, flank pain, vaginal discharge, difficulty urinating, genital sores, dyspareunia, decreased libido, nocturia, voiding less frequently, arousal difficulty, abnormal vaginal bleeding, excessive menstruation, menstrual changes, hot flashes, vaginal dryness and postmenopausal bleeding.   Musculoskeletal:  Positive for myalgias, back pain, arthralgias and muscle cramps. Negative for joint swelling, stiffness, neck pain, bone pain, muscle weakness and fracture.   Skin:  Negative for nail changes, itching, poor wound healing, rash, hair changes, skin changes, acne, warts, poor wound healing, scarring, flaky skin, Raynaud's phenomenon, sensitivity to sunlight and skin thickening.   Neurological:  Positive for light-headedness. Negative for dizziness, tingling, tremors, speech change, seizures, loss of consciousness, weakness, numbness, headaches, disturbances in coordination, extremity numbness, memory loss, difficulty walking and paralysis.   Endo/Heme:  Negative for anemia, swollen glands and bruises/bleeds easily.   Psychiatric/Behavioral:  Negative for depression, hallucinations, memory loss, decreased concentration, mood swings and panic attacks.    Breast:  Negative for breast discharge, breast mass, breast pain and nipple retraction.   Endocrine:  Negative for altered temperature regulation, polyphagia, polydipsia, unwanted hair growth and change in facial hair.      Past Medical History:    Past Medical History:   Diagnosis Date     Anxiety state, unspecified     [id number]     Arthritis 2014    vague, gradual, not treated really, knees feel it     At high risk for breast cancer 09/27/2019    30.3% lifetime risk by SHERON model     Attention deficit disorder without mention of hyperactivity      Cancer (H) 7/2017 and  1/2018    ovarian and kidney cancers, both treated well     Chronic rhinitis      Depressive disorder lifetime    plus Anxiety plus ADD. Escitalipram, therapy, ADD meds maybe     Depressive disorder, not elsewhere classified      Heart disease 1999    tachycardia and high cholesterol, managed     History of blood transfusion 7/2017    while in hospital for ovarian cancer     Hypertension 1999    tho BP was too LOW last week. past 12 years Generally OK     Panic disorder without agoraphobia      Pure hypercholesterolemia     Lipitor     Tachycardia      Tachycardia, unspecified     9/1999-2/2004     Type II or unspecified type diabetes mellitus without mention of complication, not stated as uncontrolled     diagnosed 2004         Past Surgical History:    Past Surgical History:   Procedure Laterality Date     AS REMV KIDNEY,RADICAL Right      BIOPSY  7/2017 and 2/2018    ovarian and kidney cancer biopsies. also 1987 breast benign     BREAST SURGERY  1987    date unsure. benign breast cyst removed     CATARACT IOL, RT/LT Left 05/18/2018     CATARACT IOL, RT/LT Right 07/1318     COLONOSCOPY  2008 and 2013    polyps removed. Next due fall 2018     HYSTERECTOMY TOTAL ABDOMINAL, BILATERAL SALPINGO-OOPHORECTOMY, NODE DISSECTION, COMBINED Left 7/7/2017    Procedure: COMBINED HYSTERECTOMY TOTAL ABDOMINAL, SALPINGO-OOPHORECTOMY, NODE DISSECTION;  Exploratory Laparotomy, Left Salpingo-Oophorectomy, Cancer Staging, Total Hysterectomy, Omentectomy, Evacuation of abdominal fluid, Lymph Node Dissection  Anesthesia Block ;  Surgeon: Serena Cole MD;  Location: [facility] OR     HYSTERECTOMY, PAP NO LONGER INDICATED  07/07/2017    Laparotomy; for ovarian cancer staging     HYSTERECTOMY, PAP NO LONGER INDICATED       INSERT PORT VASCULAR ACCESS Right 8/21/2017    Procedure: INSERT PORT VASCULAR ACCESS;  Single Lumen Chest Power Port;  Surgeon: Stephen Mike PA-C;  Location: [facility] OR     LYMPHADENECTOMY RETROPERITONEAL  Bilateral 07/07/2017    Laparotomy; pelvics & para-aortics; for ovarian cancer staging     OMENTECTOMY  07/07/2017    Laparotomy; for ovarian cancer staging     ORTHOPEDIC SURGERY  1/2002    broken left jaw due to fall, 4 fractures, titanium plates     PHACOEMULSIFICATION CLEAR CORNEA WITH STANDARD INTRAOCULAR LENS IMPLANT Right 7/13/2018    Procedure: PHACOEMULSIFICATION CLEAR CORNEA WITH STANDARD INTRAOCULAR LENS IMPLANT;  RIght Eye Phacoemulsification Clear Cornea with Standard Intraocular Lens Placement ;  Surgeon: Mary Jo Massey MD;  Location: [facility] OR     PHACOEMULSIFICATION CLEAR CORNEA WITH TORIC INTRAOCULAR LENS IMPLANT Left 5/18/2018    Procedure: PHACOEMULSIFICATION CLEAR CORNEA WITH TORIC INTRAOCULAR LENS IMPLANT;  Left Eye Phacoemulsification with Toric Lens;  Surgeon: Mary Jo Massey MD;  Location: [facility] OR     SALPINGO OOPHORECTOMY,R/L/KAYY Right 2008    Salpingo Oophorectomy, RT     SALPINGO OOPHORECTOMY,R/L/KAYY Left 07/07/2017    Laparotomy; for ovarian cancer staging     SURGICAL HISTORY OF -       facial surgery d/t fall in 1/2002     SURGICAL HISTORY OF -       1985 removal of breast cyst     SURGICAL HISTORY OF -   2008    endometrial ablation     YAG CAPSULOTOMY OD (RIGHT EYE)  10/22/2018         Health Maintenance Due   Topic Date Due     COVID-19 Vaccine (1) Never done     ZOSTER IMMUNIZATION (1 of 2) Never done     EYE EXAM  11/28/2019     DIABETIC FOOT EXAM  10/01/2020     BMP  03/27/2021       Current Medications:     Current Outpatient Medications   Medication Sig Dispense Refill     Acetaminophen (TYLENOL PO) Take 500 mg by mouth 2 tabs prn pain (monthly)       atorvastatin (LIPITOR) 80 MG tablet TAKE 1 TABLET(80 MG) BY MOUTH DAILY 90 tablet 3     B Complex Vitamins (VITAMIN B-COMPLEX PO) Take 50 mg by mouth        CALCIUM-MAGNESIUM-VITAMIN D PO Take 2 tablets by mouth daily       cephALEXin (KEFLEX) 500 MG capsule Take 1 capsule (500 mg) by mouth 3 times daily 21 capsule 0      Cholecalciferol (VITAMIN D) 1000 UNIT capsule Take 2,000 Units by mouth daily        DiphenhydrAMINE HCl (BENADRYL PO) Take 50 mg by mouth daily as needed (monthly)       EPINEPHrine (EPIPEN/ADRENACLICK/OR ANY BX GENERIC EQUIV) 0.3 MG/0.3ML injection 2-pack Inject 0.3 mLs (0.3 mg) into the muscle once as needed for anaphylaxis 0.3 mL PRN     escitalopram (LEXAPRO) 20 MG tablet TAKE 1 TABLET(20 MG) BY MOUTH DAILY 90 tablet 1     fluticasone (FLONASE) 50 MCG/ACT nasal spray SHAKE LIQUID AND USE 2 SPRAYS IN EACH NOSTRIL DAILY 48 g 3     LORazepam (ATIVAN) 1 MG tablet Take 1 tablet (1 mg) by mouth every 6 hours as needed (nausea/vomiting, anxiety or sleep) 30 tablet 1     Magnesium Hydroxide (MAGNESIA PO) Take 500 mg by mouth       metFORMIN (GLUCOPHAGE-XR) 500 MG 24 hr tablet TAKE 2 TABLETS BY MOUTH EVERY DAY WITH THE EVENING MEAL 180 tablet 1     Multiple Vitamins-Minerals (MULTIVITAMIN ADULT PO) Take 1 tablet by mouth daily       nitroFURantoin macrocrystal-monohydrate (MACROBID) 100 MG capsule Take one capsule after intercourse as needed 30 capsule PRN     Probiotic Product (PRO-BIOTIC BLEND PO) Take 1 capsule by mouth daily Enzymatic Therapy-probiotic pearls       rivaroxaban ANTICOAGULANT (XARELTO) 20 MG TABS tablet Take 1 tablet (20 mg) by mouth daily (with dinner) 90 tablet 4     tamoxifen (NOLVADEX) 10 MG tablet Cut each pill in 1/2 with pill cutter to ensure 5 mg daily dose. 45 tablet 3     traZODone (DESYREL) 50 MG tablet TAKE 1 TABLET  BY MOUTH EVERY NIGHT AS NEEDED FOR SLEEP 180 tablet PRN     tamoxifen (NOLVADEX) 10 MG tablet Take 0.5 tablets (5 mg) by mouth daily Cut each pill in 1/2 with pill cutter to ensure 5 mg daily dose. 45 tablet 1         Allergies:        Allergies   Allergen Reactions     Paclitaxel Anaphylaxis and Difficulty breathing     Wasps [Hornets] Anaphylaxis     Yellow Hornet Venom [Hornet Venom] Anaphylaxis and Swelling     Yellow Jacket Venom [Venomil Honey Bee] Anaphylaxis and  Swelling     Sulfa Drugs Hives and Rash        Social History:     Social History     Tobacco Use     Smoking status: Never Smoker     Smokeless tobacco: Never Used   Substance Use Topics     Alcohol use: Yes     Comment: Very infrequent, a bit of wine or beer 2x per month maybe       History   Drug Use     Types: Marijuana     Comment: infrequent, for celebrations only, maybe 8x per year         Family History:     The patient's family history is notable for:     Family History   Problem Relation Age of Onset     C.A.D. Father      Diabetes Father         Later in his life, in his 70s     Hypertension Father      Cerebrovascular Disease Father         1984 or      Psychotic Disorder Father      Pancreatic Cancer Father      Coronary Artery Disease Father         diagnosed in his late 50s (age)     Hyperlipidemia Father         treated w statins     Other Cancer Father         pancreatic, ,  of this     Depression Father      Mental Illness Father         likely PTSD and depression     Obesity Father      C.A.D. Mother      Hypertension Mother      Breast Cancer Mother         2 times,  and ?     Psychotic Disorder Mother      Colon Cancer Mother         ?     Cancer Mother         Bone cancer     Coronary Artery Disease Mother         diagnosed in her late 70s (age)     Hyperlipidemia Mother         treated w. statins     Other Cancer Mother         bone/marrow, ,  of this     Depression Mother      Anxiety Disorder Mother      Mental Illness Mother         various undiagnosed types, but THERE     Obesity Mother      Prostate Problems Brother         cleared      Hypertension Brother      Hyperlipidemia Brother         unsure if treated w statins     Depression Brother      Anxiety Disorder Brother      Mental Illness Brother         undiagnosed but THERE     Obesity Brother      Psychotic Disorder Sister         x2     Neurologic Disorder Sister      Hypertension Sister       "Hyperlipidemia Sister         treated w statins     Depression Sister      Anxiety Disorder Sister      Obesity Sister      Psychotic Disorder Brother         x2     Depression Brother         major depressive disorder and OCD     Anxiety Disorder Brother      Mental Illness Brother         not sure of diagnoses, treated     Hypertension Brother      Hyperlipidemia Brother      Psychotic Disorder Maternal Grandmother         ?     Coronary Artery Disease Maternal Grandmother          of heart attack age 84?     Depression Maternal Grandmother      Psychotic Disorder Maternal Grandfather         ?     Psychotic Disorder Paternal Grandmother         Schizophernia     Coronary Artery Disease Paternal Grandmother          of heart attack, age 85?     Depression Paternal Grandmother         severe, but recovered     Mental Illness Paternal Grandmother         possible bipolar or other     Psychotic Disorder Paternal Grandfather         ?     Respiratory Paternal Grandfather          of emphysema and smoking     Psychotic Disorder Sister      Other - See Comments Sister         small kidney stone      Hypertension Sister      Hyperlipidemia Sister         treated w statins     Depression Sister      Anxiety Disorder Sister      Cancer - colorectal No family hx of      Glaucoma No family hx of      Macular Degeneration No family hx of          Physical Exam:     /80   Pulse 105   Temp 98.7  F (37.1  C) (Oral)   Resp 18   Ht 1.598 m (5' 2.91\")   Wt 94.3 kg (208 lb)   LMP 2009   SpO2 99%   BMI 36.95 kg/m    Body mass index is 36.95 kg/m .    General Appearance: healthy and alert, no distress     HEENT: no thyromegaly, no palpable nodules or masses        Cardiovascular: regular rate and rhythm, no gallops, rubs or murmurs     Respiratory: lungs clear, no rales, rhonchi or wheezes    Musculoskeletal: extremities non tender with 1+ BL LE edema     Breast:  No chest deformity, asymmetry. Normal " contours. No nodules, masses, tenderness, or axillary adenopathy. No nipple discharge.     Skin:  no lesions or rashes    Neurological: normal gait, no gross defects     Psychiatric: appropriate mood and affect                               Hematological: normal cervical, supraclavicular and inguinal lymph nodes     Gastrointestinal:       abdomen soft, non-tender, non-distended, no organomegaly or masses    Genitourinary: External genitalia and urethral meatus appears normal. Small scar on left inner vulva well healed without drainage, erythema, tenderness. Vagina is smooth without nodularity or masses. Cervix surgically absent.  Bimanual exam reveal no masses, nodularity or fullness. Recto-vaginal exam confirms these findings.      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 She completed treatment with surgery and three cycles of chemotherapy on 10/16/17. She is here today for a surveillance visit.      A total of 30 minutes was spent with the patient, over 50% minutes of which were spent in counseling the patient and/or treatment planning.      Plan:     1.)         ASHISH on exam and  stable. Breast exam completed per patient request. Three years out from treatment, may continue surveillance every six months x3 years (through 10/2022) then annually thereafter with  and pelvic/rectal exam. Continue q6wk port flushes. Encouraged discussion with allergist and PCP regarding possible allergy to COVID vaccine. Right now per patient, allergist is recommending J&J vaccine which she is trying to schedule herself. Reviewed signs and symptoms for when she should contact the clinic or seek additional care. Patient to contact the clinic with any questions or concerns in the interim.     2.) Genetic testing: Hafsa is negative for mutations in the AIP, ALK, APC, COSTA, BAP1, BARD1, BLM, BRCA1, BRCA2, BRIP1, BMPR1A, CDH1, CDK4, CDKN1B, CDKN2A,  CHEK2, DICER1, EPCAM, FANCC, FH, FLCN, GALNT12, GREM1, HOXB13, MAX, MEN1, MET, MITF, MLH1, MRE11A, MSH2, MSH6, MUTYH, NBN, NF1, NF2, PALB2, PHOX2B, PMS2, POLD1, POLE, POT1, PSOLG8T, PTCH1, PTEN, RAD50, RAD51C, RAD51D, RB1, RET, SDHA, SDHAF2, SDHB, SDHC, SDHD, SMAD4, SMARCA4, SMARCB1, SMARCE1, STK11, SUFU, BLSU278, TP53, TSC1, TSC2, VHL, and XRCC2 genes. No mutations were found in any of the 67 genes analyzed. High risk breast screening. Sees [facility]. On low dose tamoxifen since 10/10/2019 for prevention of breast cancer.     3.) Labs and/or tests ordered include:  Port flushes every 6 weeks; Ca 125 and port flush with NP visit in six months   4.) Health maintenance issues addressed today include annual health maintenance and non-gynecologic issues with PCP. She is due to see PCP and will call to schedule this appt. She will follow up with Pelvic floor PT and Urology here at the .     5.)        Lung nodules: stable for two years on CT considered statistically benign and no further imaging needed unless new symptoms.     6.)        Urinary incontinence: stress and urge. Will refer to pelvic floor physical therapy for this. She is aware to start incorporating kegel exercises into her daily routine. We discussed a urogynecology referral but she would like to start with pelvic floor PT first. She denies any s/s of UTI. She would like to establish care with Dr. 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     7.)        Referrals: referral for Urology (Dr. Gregorio) at the [facility], referral for pelvic floor physical therapy placed again 4/1/2021        Iris Dalton, MSN, [facility]-BC  Women's Health Nurse Practitioner  Division of Gynecologic Oncology    CC  Patient Care Team:  Morelia Ayon NP as PCP - Karla Perez, RN as Continuity Care Coordinator (Gynecologic Oncology)  Nabeel Dick MD as MD (Urology)  Jg,  Javier Street MD as MD (Urology)  Lola Vasquez, PhD LP as Psychologist (Psychology)  Jarod Shaffer MD as Assigned Cancer Care Provider

## 2021-04-01 NOTE — NURSING NOTE
"Oncology Rooming Note    April 1, 2021 1:24 PM   Hafsa Corona is a 66 year old female who presents for:    Chief Complaint   Patient presents with     RECHECK     Ovarian cancer (H)      Initial Vitals: /80   Pulse 105   Temp 98.7  F (37.1  C) (Oral)   Resp 18   Ht 1.598 m (5' 2.91\")   Wt 94.3 kg (208 lb)   LMP 01/01/2009   SpO2 99%   BMI 36.95 kg/m   Estimated body mass index is 36.95 kg/m  as calculated from the following:    Height as of this encounter: 1.598 m (5' 2.91\").    Weight as of this encounter: 94.3 kg (208 lb). Body surface area is 2.05 meters squared.  No Pain (0) Comment: Data Unavailable   Patient's last menstrual period was 01/01/2009.  Allergies reviewed: Yes  Medications reviewed: Yes    Medications: Medication refills not needed today.  Pharmacy name entered into [facility]: [id number] - [place], [place] - [address] AT [facility] [address]    Clinical concerns: no new needs or concerns    Franny Frazier CMA              "

## 2021-04-09 NOTE — TELEPHONE ENCOUNTER
MEDICAL RECORDS REQUEST   [facility] Clinic  [address] [place] [address]  PHONE: [phone]  Fax: [phone]        FUTURE VISIT INFORMATION                                                   Hafsa Corona, : 1955 scheduled for future visit at [facility]    APPOINTMENT INFORMATION:    Date: 2021    Provider:  Rogelio MIXON    Reason for Visit/Diagnosis: Mixed stress urinary     REFERRAL INFORMATION:    Referring provider:  N/A    Specialty: N/A    Referring providers clinic:      Clinic contact number:  N/A    RECORDS REQUESTED FOR VISIT                                                     NOTES  STATUS/DETAILS   OFFICE NOTE from referring provider  yes   OFFICE NOTE from other specialist  no   DISCHARGE SUMMARY from hospital  no   DISCHARGE REPORT from the ER  no   OPERATIVE REPORT  no   MEDICATION LIST  no   LABS     URINALYSIS (UA)  yes     PRE-VISIT CHECKLIST      Record collection complete Yes   Appointment appropriately scheduled           (right time/right provider) Yes   MyChart activation Yes   Questionnaire complete If no, please explain: In process      Completed by: Mary Jo Ocampo

## 2021-04-27 ENCOUNTER — THERAPY VISIT (OUTPATIENT)
Dept: PHYSICAL THERAPY | Facility: CLINIC | Age: 66
End: 2021-04-27
Attending: OBSTETRICS & GYNECOLOGY
Payer: COMMERCIAL

## 2021-04-27 DIAGNOSIS — N39.46 MIXED INCONTINENCE: ICD-10-CM

## 2021-04-27 DIAGNOSIS — N81.89 PELVIC FLOOR WEAKNESS IN FEMALE: ICD-10-CM

## 2021-04-27 PROCEDURE — 97530 THERAPEUTIC ACTIVITIES: CPT | Mod: GP | Performed by: PHYSICAL THERAPIST

## 2021-04-27 PROCEDURE — 97161 PT EVAL LOW COMPLEX 20 MIN: CPT | Mod: GP | Performed by: PHYSICAL THERAPIST

## 2021-04-27 PROCEDURE — 97110 THERAPEUTIC EXERCISES: CPT | Mod: GP | Performed by: PHYSICAL THERAPIST

## 2021-04-27 NOTE — PROGRESS NOTES
Physical Therapy Initial Evaluation  Subjective:  The history is provided by the patient. No  was used.   Patient Health History  Hafsa Corona being seen for PT- bladder leakage, began years ago, got worse after surgery 2017 (hysterectomy, oopherectomy).     Date of Onset: 4/1/21 date of order.      Pain is reported as 0/10 on pain scale.  General health as reported by patient is good.  Health conditions: anemia, cancer, concussions/dizziness, depression, diabetes, heart problems, high blood presure, hx of fx, implanted device, incontinence, kidney disease, mental illness, sleep disorder/apnea, overweight, calf pain.   Red flags:  Incontinence.  Medical allergies: sulfa, waaps, pacletaxol.   Surgeries include:  Cancer surgery. Other surgery history details: Left jaw broken.    Current medications:  Anti-depressants and sleep medication. Other medications details: metformin, lipitor, epi-pen, nitrofurion, xaralto.    Current occupation is Employment Counselor.   Primary job tasks include:  Computer work, prolonged sitting and driving.                  Therapist Assessment:   Clinical Impression: Pt presents with primary complaint of mixed incontinence.  Per clinical examination, pt with poor pelvic floor musclecoordination and isolation of pelvic floor muscles.  Pt will benefit from skilled physical therapy for coordination training of pelvic floor muscles, education on bladder health, and instruction on urge suppression techniques.    Chief Complaint:  The pt presents today with mixed urinary incontinence. The pt notes coffee is a trigger for incontinence. The pt will sometimes ignore the urge to urinate. The pt notes urinary leakage 5x a week.    The pt is hoping to get back on her ADHD medications.    Current activity: walking daily for ~30 minutes with her dogs    Goals: reduce urges to urinate and limiting incontinence    Urination   Do you leak on the way to the bathroom or with a strong  urge to void? yes  Do you leak with cough, sneeze, jumping, running? yes  Any other activities that cause leaking? In the shower  Do you have triggers that make you feel you can't wait to go to the bathroom? When have UTI What are they? UTI  Type of pad and number used per day? 1-2 extra thick when she is out and about  When you leak what is the amount? Small to large  How long can you delay the need to urinate? Sometimes 5 mintues  How many times do you get up to urinate at night? 1-2x  How many times do you urinate during the day? ~5x a day  Can you stop the flow of urine when on the toilet? yes  Is the volume of urine passed usually: medium  Do you strain to pass urine? no  Do you have a slow or hesitant urinary stream? no  Do you have difficulty initiating the urine stream? no  How many bladder infections have you had in the last 12 months? 1-2  What is you fluid intake per day? Water (8oz) 2-3  Caffeine 2 coffee  Alcohol rarely  Do you feel like you empty completely when voiding? yes    Bowel Habits  Frequency of bowel movements? 1-3  Consistency of stool? Soft formed  Do you ignore the urge to defecate? no  Do you strain to pass stool? occasionally  Do you feel that you empty completely? yes    Pelvic Pain  When do you have pelvic pain? no  Are you sexually active? yes  Is initial penetration during intercourse painful? no  Is deeper penetration painful? no  Do you use lubrication? Yes, water based  Have you given birth? no  Have you ever been worried for your physical safety? no  Any abdominal or pelvic surgeries? Yes, 2017 July hysterectomy and oopherectomy  Are you having regular exercise? yes  Have you practiced the PF (kegel) exercise for 4 or more weeks? no    Objective:    POSTURE: forward head    FLEXIBILITY:tightness of adductors bilaterally    ABDOMINAL WALL: increased fascial restrictions of abdominal incisions and right side suprapubic region    EXTERNAL ASSESSMENT:  Skin condition:normal  Bearing  down/coughing:bulge  Muscle contraction/perineal mobility: slight lift, no urogenital triangle descent     INTERNAL VAGINAL ASSESSMENT:  Baseline PF tone:normal  PF Tone with cough: slight bulge   PF Response quality: normal  PF Power: Center: 3+/5  Accessory Muscle use:glutes, adductors, valsalva  Endurance: Maximum contraction in seconds:10/10   Quick contraction repetitions prior to fatigue: 10/10  Palpation: tenderness to palpation of: n/a      Assessment/Plan:    Patient is a 66 year old female with pelvic complaints.    Patient has the following significant findings with corresponding treatment plan.                Diagnosis 1:  Pelvic floor dysfunciton  Pain -  hot/cold therapy, US, electric stimulation, mechanical traction, manual therapy, STS, splint/taping/bracing/orthotics, self management, education and directional preference exercise  Decreased ROM/flexibility - manual therapy, therapeutic exercise, therapeutic activity and home program  Decreased strength - therapeutic exercise, therapeutic activities and home program  Impaired muscle performance - biofeedback, electric stimulation, neuro re-education and home program    Therapy Evaluation Codes:   Cumulative Therapy Evaluation is: Low complexity.    Previous and current functional limitations:  (See Goal Flow Sheet for this information)    Short term and Long term goals: (See Goal Flow Sheet for this information)     Communication ability:  Patient appears to be able to clearly communicate and understand verbal and written communication and follow directions correctly.  Treatment Explanation - The following has been discussed with the patient:   RX ordered/plan of care  Anticipated outcomes  Possible risks and side effects  This patient would benefit from PT intervention to resume normal activities.   Rehab potential is good.    Frequency:  1 X week, once daily  Duration:  for 8 weeks  Discharge Plan:  Achieve all LTG.  Independent in home treatment  program.  Reach maximal therapeutic benefit.    Please refer to the daily flowsheet for treatment today, total treatment time and time spent performing 1:1 timed codes.

## 2021-04-28 ENCOUNTER — IMMUNIZATION (OUTPATIENT)
Dept: NURSING | Facility: CLINIC | Age: 66
End: 2021-04-28
Payer: COMMERCIAL

## 2021-04-28 ENCOUNTER — VIRTUAL VISIT (OUTPATIENT)
Dept: FAMILY MEDICINE | Facility: CLINIC | Age: 66
End: 2021-04-28
Payer: COMMERCIAL

## 2021-04-28 DIAGNOSIS — Z78.0 ASYMPTOMATIC MENOPAUSAL STATE: ICD-10-CM

## 2021-04-28 DIAGNOSIS — E11.9 TYPE 2 DIABETES MELLITUS WITHOUT COMPLICATION, WITHOUT LONG-TERM CURRENT USE OF INSULIN (H): Primary | ICD-10-CM

## 2021-04-28 DIAGNOSIS — Z87.892 HISTORY OF ANAPHYLAXIS: ICD-10-CM

## 2021-04-28 PROCEDURE — 99215 OFFICE O/P EST HI 40 MIN: CPT | Mod: GT | Performed by: NURSE PRACTITIONER

## 2021-04-28 PROCEDURE — 91300 PR COVID VAC PFIZER DIL RECON 30 MCG/0.3 ML IM: CPT

## 2021-04-28 PROCEDURE — 0001A PR COVID VAC PFIZER DIL RECON 30 MCG/0.3 ML IM: CPT

## 2021-04-28 NOTE — PROGRESS NOTES
Hafsa is a 66 year old who is being evaluated via a billable video visit.      How would you like to obtain your AVS? MyChart  If the video visit is dropped, the invitation should be resent by: Send to e-mail at: [email or web address]  Will anyone else be joining your video visit? No    Video Start Time: 8:29 AM    Assessment & Plan     Type 2 diabetes mellitus without complication, without long-term current use of insulin (H)  Get labs with next port flush.  - **A1C FUTURE anytime; Future  - **Basic metabolic panel FUTURE anytime; Future    History of anaphylaxis  She has been cleared to get any COVID vaccine, per her allergist.  Discussed scheduling at [place], [place] or [place], which have hospital accessibility.    Asymptomatic menopausal state    - DX Hip/Pelvis/Spine; Future      40 minutes spent on the date of the encounter doing patient visit and documentation            Return in about 8 months (around 12/28/2021) for Routine preventive.    Morelia Ayon NP  [employer]   Hafsa is a 66 year old who presents for the following health issues     HPI     Pt has multiple concerns that she would like to address     She did have anaphylactic reaction to her first two chemo treatments.  She was told it was an ingredient that may have PEG in it?   She did have a consult with an allergist ([facility]) and was told she can get any vaccine, but should get it at a clinic attached to a hospital.    She has lost an inch in height and would like to get a DEXA scan.    Her blood sugars have been well-controlled.        Review of Systems         Objective           Vitals:  No vitals were obtained today due to virtual visit.    Physical Exam   GENERAL: Healthy, alert and no distress  EYES: Eyes grossly normal to inspection.  No discharge or erythema, or obvious scleral/conjunctival abnormalities.  RESP: No audible wheeze, cough, or visible cyanosis.  No visible  retractions or increased work of breathing.    SKIN: Visible skin clear. No significant rash, abnormal pigmentation or lesions.  NEURO: Cranial nerves grossly intact.  Mentation and speech appropriate for age.  PSYCH: Mentation appears normal, affect normal/bright, judgement and insight intact, normal speech and appearance well-groomed.                Video-Visit Details    Type of service:  Video Visit    Video End Time:9:06 AM    Originating Location (pt. Location): Home    Distant Location (provider location):  [facility]     Platform used for Video Visit: KVK TEAM

## 2021-05-07 ENCOUNTER — THERAPY VISIT (OUTPATIENT)
Dept: PHYSICAL THERAPY | Facility: CLINIC | Age: 66
End: 2021-05-07
Payer: COMMERCIAL

## 2021-05-07 DIAGNOSIS — N39.46 MIXED INCONTINENCE: ICD-10-CM

## 2021-05-07 PROCEDURE — 97535 SELF CARE MNGMENT TRAINING: CPT | Mod: GP | Performed by: PHYSICAL THERAPIST

## 2021-05-07 PROCEDURE — 97110 THERAPEUTIC EXERCISES: CPT | Mod: GP | Performed by: PHYSICAL THERAPIST

## 2021-05-10 PROCEDURE — 250N000011 HC RX IP 250 OP 636: Performed by: OBSTETRICS & GYNECOLOGY

## 2021-05-10 PROCEDURE — 96523 IRRIG DRUG DELIVERY DEVICE: CPT

## 2021-05-10 RX ORDER — HEPARIN SODIUM (PORCINE) LOCK FLUSH IV SOLN 100 UNIT/ML 100 UNIT/ML
5 SOLUTION INTRAVENOUS ONCE
Status: COMPLETED | OUTPATIENT
Start: 2021-05-10 | End: 2021-05-10

## 2021-05-10 RX ADMIN — Medication 5 ML: at 17:04

## 2021-05-10 NOTE — NURSING NOTE
"Chief Complaint   Patient presents with     Port Flush     Port flushed by RN.     Port accessed with 20g 3/4\" gripper needle. Port was flushed with NS and heparin. Blood return noted. De-accessed. Pt tolerated well.    Zachery Medina RN  "

## 2021-05-14 ENCOUNTER — THERAPY VISIT (OUTPATIENT)
Dept: PHYSICAL THERAPY | Facility: CLINIC | Age: 66
End: 2021-05-14
Payer: COMMERCIAL

## 2021-05-14 DIAGNOSIS — N39.46 MIXED INCONTINENCE: ICD-10-CM

## 2021-05-14 PROCEDURE — 97110 THERAPEUTIC EXERCISES: CPT | Performed by: PHYSICAL THERAPIST

## 2021-05-14 PROCEDURE — 97535 SELF CARE MNGMENT TRAINING: CPT | Performed by: PHYSICAL THERAPIST

## 2021-05-19 ENCOUNTER — IMMUNIZATION (OUTPATIENT)
Dept: NURSING | Facility: CLINIC | Age: 66
End: 2021-05-19
Attending: INTERNAL MEDICINE
Payer: COMMERCIAL

## 2021-05-19 PROCEDURE — 91300 PR COVID VAC PFIZER DIL RECON 30 MCG/0.3 ML IM: CPT

## 2021-05-19 PROCEDURE — 0002A PR COVID VAC PFIZER DIL RECON 30 MCG/0.3 ML IM: CPT

## 2021-05-31 VITALS — HEIGHT: 64 IN | BODY MASS INDEX: 34.83 KG/M2 | WEIGHT: 204 LBS

## 2021-05-31 VITALS — WEIGHT: 207.4 LBS | HEIGHT: 64 IN | BODY MASS INDEX: 35.41 KG/M2

## 2021-06-04 ENCOUNTER — THERAPY VISIT (OUTPATIENT)
Dept: PHYSICAL THERAPY | Facility: CLINIC | Age: 66
End: 2021-06-04
Payer: COMMERCIAL

## 2021-06-04 DIAGNOSIS — N39.46 MIXED INCONTINENCE: ICD-10-CM

## 2021-06-04 PROCEDURE — 97530 THERAPEUTIC ACTIVITIES: CPT | Mod: GP | Performed by: PHYSICAL THERAPIST

## 2021-06-04 PROCEDURE — 97110 THERAPEUTIC EXERCISES: CPT | Mod: GP | Performed by: PHYSICAL THERAPIST

## 2021-06-09 ENCOUNTER — TELEPHONE (OUTPATIENT)
Dept: ONCOLOGY | Facility: CLINIC | Age: 66
End: 2021-06-09

## 2021-06-09 DIAGNOSIS — B37.31 YEAST INFECTION OF THE VAGINA: Primary | ICD-10-CM

## 2021-06-09 RX ORDER — FLUCONAZOLE 150 MG/1
150 TABLET ORAL DAILY
Qty: 2 TABLET | Refills: 1 | Status: SHIPPED | OUTPATIENT
Start: 2021-06-09 | End: 2022-07-06

## 2021-06-09 NOTE — TELEPHONE ENCOUNTER
Per Iris Dalton, can use Monistat cream on vulva and in vagina. Could also sent in diflucan plus a refill.     Is it thickened patches? Patch is slightly hard raised skin that almost feels like a callous. It can not be wiped off. Encouraged to use applicator when using Monistat, so she gets correct amount of cream placed in vagina. Will ask for diflucan prescription.

## 2021-06-09 NOTE — TELEPHONE ENCOUNTER
Call placed to Hafsa to let her know that the prescription was sent to [facility]. Also let her know that Iris Dalton would like to see her to evaluate the area for a possible biopsy. Someone will call you to set up the appointment.

## 2021-06-09 NOTE — TELEPHONE ENCOUNTER
Hafsa calls in today with vaginal itching that has been going on for several weeks. Tried Monistat with minor relief of itching. States she looked at vulva with mirror and she has white blistery patches on vulva. Would like an appointment to seen by Iris Dalton.   Advised to wear cotton underwear, loose clothing, and to try cool water soaks.

## 2021-06-10 ENCOUNTER — ONCOLOGY VISIT (OUTPATIENT)
Dept: ONCOLOGY | Facility: CLINIC | Age: 66
End: 2021-06-10
Attending: OBSTETRICS & GYNECOLOGY
Payer: COMMERCIAL

## 2021-06-10 VITALS
SYSTOLIC BLOOD PRESSURE: 121 MMHG | WEIGHT: 212.3 LBS | HEART RATE: 103 BPM | BODY MASS INDEX: 37.71 KG/M2 | DIASTOLIC BLOOD PRESSURE: 78 MMHG | OXYGEN SATURATION: 95 % | RESPIRATION RATE: 18 BRPM

## 2021-06-10 DIAGNOSIS — B37.2 YEAST INFECTION OF THE SKIN: Primary | ICD-10-CM

## 2021-06-10 DIAGNOSIS — B37.31 YEAST INFECTION OF THE VAGINA: ICD-10-CM

## 2021-06-10 PROCEDURE — G0463 HOSPITAL OUTPT CLINIC VISIT: HCPCS

## 2021-06-10 PROCEDURE — 99214 OFFICE O/P EST MOD 30 MIN: CPT | Performed by: OBSTETRICS & GYNECOLOGY

## 2021-06-10 RX ORDER — NYSTATIN 100000 [USP'U]/G
POWDER TOPICAL 2 TIMES DAILY PRN
Qty: 60 G | Refills: 1 | Status: SHIPPED | OUTPATIENT
Start: 2021-06-10 | End: 2023-08-01

## 2021-06-10 RX ORDER — NYSTATIN 100000 U/G
OINTMENT TOPICAL 2 TIMES DAILY
Qty: 30 G | Refills: 1 | Status: SHIPPED | OUTPATIENT
Start: 2021-06-10 | End: 2021-06-17

## 2021-06-10 ASSESSMENT — PAIN SCALES - GENERAL: PAINLEVEL: NO PAIN (0)

## 2021-06-10 NOTE — NURSING NOTE
"Oncology Rooming Note    Gaviota 10, 2021 2:05 PM   Hafsa Corona is a 66 year old female who presents for:    Chief Complaint   Patient presents with     Oncology Clinic Visit     EVAL VAGINAL AREA     Initial Vitals: /78   Pulse 103   Resp 18   Wt 96.3 kg (212 lb 4.8 oz)   LMP 01/01/2009   SpO2 95%   BMI 37.71 kg/m   Estimated body mass index is 37.71 kg/m  as calculated from the following:    Height as of 4/1/21: 1.598 m (5' 2.91\").    Weight as of this encounter: 96.3 kg (212 lb 4.8 oz). Body surface area is 2.07 meters squared.  No Pain (0) Comment: Data Unavailable   Patient's last menstrual period was 01/01/2009.  Allergies reviewed: Yes  Medications reviewed: Yes    Medications: Medication refills not needed today.  Pharmacy name entered into [employer]: [id number] - [place], [place] - [address] AT [facility] [address]    Clinical concerns: None.      Dayanna Saha MA            "

## 2021-06-10 NOTE — LETTER
6/10/2021         RE: Hafsa Corona  [address]  [place] [address]        Dear Colleague,    Thank you for referring your patient, Hafsa Corona, to the [facility]. Please see a copy of my visit note below.                    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 She completed treatment with surgery and three cycles of chemotherapy on 10/16/17. She is here today for an acute visit.     Oncology History:  The patient has had a recent episode of lower abdominal pain in early July.  She was subsequently sent to the emergency room and was found to have a large 9 cm complex ovarian mass. She was admitted to the hospital for pain control.  7/3/17:  1187  17: Exploratory laparotomy, total abdominal hysterectomy, left salpingo-oophorectomy, cancer staging including infracolic omentectomy, bilateral pelvic & para-aortic lymphadenectomy, peritoneal biopsies, evacuation of pelvic fluid by Dr. Cole.    FINAL DIAGNOSIS:   A. LEFT OVARY AND FALLOPIAN TUBE, LEFT SALPINGO-OOPHORECTOMY:   - Ovarian clear cell carcinoma   - Background of endometriosis   - Fallopian tube with no significant histologic abnormality.   B. UTERUS, HYSTERECTOMY:   -  Inactive endometrium   -  Myometrium with adenomyosis and leiomyoma.   -  Cervix with squamous metaplasia.   C. PERITONEUM, RIGHT PELVIS, BIOPSY:   - Fibroadipose tissue, negative for malignancy.   D. LYMPH NODES, RIGHT PELVIC, DISSECTION:   - Thirteen benign lymph nodes (0/13).   E. LYMPH NODES, LEFT PELVIC, DISSECTION:   - Seven benign lymph nodes (0/7).   F. PERITONEUM, LEFT PELVIS, BIOPSY:   - Fibroadipose tissue, negative for malignancy.   G. PERITONEUM, BLADDER, BIOPSY:   - Fibroadipose tissue  with acute and chronic inflammation, negative for  malignancy.   H. PERITONEUM, POSTERIOR CUL-DE-SAC, BIOPSY:   - Fibroadipose tissue, negative for malignancy.   I. PERITONEUM, LEFT PARACOLIC GUTTER, BIOPSY:   - Fibroadipose tissue, negative for malignancy.   J. LYMPH NODES, LEFT PARA-AORTIC, DISSECTION:   - Five benign lymph nodes (0/5).   K. LYMPH NODES, RIGHT PARA-AORTIC, DISSECTION:   - Two benign lymph nodes (0/2).   L. PERITONEUM, RIGHT PARACOLIC GUTTER, BIOPSY:   - Fibroadipose tissue, negative for malignancy.   M. OMENTUM, OMENTECTOMY:   - Adipose tissue with reactive changes, negative for malignancy.   COMMENT:   The final diagnosis confirms the interpretation provided intraoperatively.   Report Name: Ovary or Fallopian Tube   Status: Submitted   Part(s) Involved:   A: Ovary and fallopian tube, left   Synoptic Report:   CLINICAL   Clinical History:   - Pelvic mass   SPECIMEN   Procedure:   - Left salpingo-oophorectomy   - Hysterectomy   - Omentectomy   - Peritoneal  biopsies   Lymph Node Sampling:   - Performed   Location:   - Pelvic lymph nodes   - Para-aortic lymph nodes   Specimen Integrity:   - Left ovary   Specimen Integrity of Left Ovary:   - Capsule intact   TUMOR   Primary Tumor Site(s):   - Left ovary   Left Ovary   Tumor Size of Left Ovary: 19 cm   Histologic Type:   - Clear cell carcinoma   Tumor Extent   Ovarian Surface Involvement:   - Absent   Specimen(s)   Extent of Left Ovary:   - Involved   Extent of Left Fallopian Tube:   - Not involved   Extent of Omentum:   - Not involved   Extent of Uterus:   - Not involved   Extent of Peritoneum:   - Not involved   Peritoneal Ascitic Fluid:   - Not performed / unknown   Pleural Fluid:   - Not performed / unknown   LYMPH NODES     Number of Pelvic Lymph Nodes Examined: 20     Number of Pelvic Lymph Nodes Involved: None identified     Number of Para-aortic Lymph Nodes Examined: 7     Number of Para-Aortic Lymph Nodes Involved: None identified   STAGE (PTNM, AJCC 7TH ED.)   Primary Tumor (pT):   - Ovary    Ovarian Primary Tumor (pT):   - pT1a: Tumor limited to 1 ovary; capsule intact, no tumor on ovarian surface. No malignant cells in ascites or peritoneal washings#   Regional Lymph Nodes (pN):   - pN0: No regional lymph node metastasis       07/31/17: Dr Shaffer follow up: Discussed with the patient that given the high risk histology, as well as ruptured mass before surgery, she would be qualified at higher risk of recurrence despite early stage ovarian cancer.  Recommended adjuvant chemotherapy. Plan for carboplatin of AUC of 6 and paclitaxel of 175, m2 for 3 cycles. Referral for genetic counselor and will see her back after those 3 cycles  08/03/17: Call from patient stating she is going for a second opinion and would like chemotherapy rescheduled to week of 8/14/17.      08/16/17: Cycle #1 Carbo/Taxol.  73. Significant paclitaxel reaction.  08/30/17: C1 carboplatin/inpatient paclitaxel desensitization.   09/25/17: C2 carboplatin/paclitaxel- inpatient paclitaxel desensitization.  15.  10/16/17: C3 carboplatin/paclitaxel- switched to docetaxel given her neuropathy.  10.  11/14/17:  8. CT CAP:  IMPRESSION:   1. Post surgical changes of hysterectomy and bilateral  salpingo-oophorectomy. Nodular soft tissue density in the posterior  cul-de-sac along with ill-defined nodular thickening in the left  pelvis, suspicious for residual disease or metastatic deposits.   2. There is a 3 cm mass in the superior pole of the right kidney,  possibly extending into the renal hilum. Represents RCC until proven  otherwise. Consider renal mass protocol CT to assess renal vein  involvement.  3. Stable sub-4 mm pulmonary nodules, continued attention on  follow-up.     11/16/17: CT renal mass protocol  IMPRESSION:  1. Arterially enhancing mass measuring 3.6 x 2.1 x 2.2 cm mass arising  from superior posterior of the right kidney, this is renal cell  carcinoma until proven otherwise.  2. Stable fat-containing  umbilical hernia.     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 Margins negative. Three month surveillance plan with [place].     2/26/18:  14     5/22/18:  11      8/23/18:  11     12/12/18:  10     3/8/19:  12     6/19/19:  13     9/23/19:  12    11/8/19: CT Chest:   IMPRESSION:   1. Stable, sub-4 mm pulmonary nodules as above.  2. Stable hypodense, subcentimeter nodules in the thyroid.     3/27/20:  14    10/1/20:  15    3/29/21:  10          Today Hafsa comes to the clinic reporting ongoing ~ three weeks vulvar itching in and around her urethra and introitus. She does notice a thick white discharge and a possible slight odor. She is sexually active with her boyfriend and is using a vibrator with intercourse. She does urinate after intercourse. She denies UTI symptoms. She has been using Monistat cream intermittently over the past few days. She has not picked up the Diflucan rx. She is doing Pelvic floor PT and has noticed and improvement with her urinary leakage.  She continues to take nitrofurantoin as post-coital for UTI prophylaxis. She is on Xarelto daily for hx of DVT. She still has her port in place, having this flushed infrequently- does not want it out yet due to fear of recurrence and the possible need for the port in the future. She denies any vaginal bleeding, no changes in her bowel or bladder habits, no nausea/emesis, and no difficulties eating. She denies any abdominal discomfort/bloating, no fevers or chills, and no chest pain or shortness of breath. She up to date on breast screening (high risk screening through [facility]).  She is up to date on her colonoscopy (1/6/20). She has had both COVID vaccines.           Review of Systems     Constitutional:  Positive for weight gain, fatigue and height loss. Negative for fever, chills, weight loss, decreased appetite, night sweats, recent stressors,  post-operative complications, incisional pain, hallucinations, increased energy, hyperactivity and confused.   HENT:  Positive for nosebleeds, sinus pain and sinus congestion. Negative for ear pain, hearing loss, tinnitus, trouble swallowing, hoarse voice, mouth sores, sore throat, ear discharge, tooth pain, gum tenderness, taste disturbance, smell disturbance, hearing aid, bleeding gums, dry mouth and neck mass.    Eyes:  Negative for double vision, pain, redness, eye pain, decreased vision, eye watering, eye bulging, eye dryness, flashing lights, spots, floaters, strabismus, tunnel vision, jaundice and eye irritation.   Respiratory:   Negative for cough, hemoptysis, sputum production, shortness of breath, wheezing, sleep disturbances due to breathing, snores loudly, respiratory pain, dyspnea on exertion, cough disturbing sleep and postural dyspnea.    Cardiovascular:  Positive for palpitations, leg pain and light-headedness. Negative for chest pain, dyspnea on exertion, orthopnea, fingers/toes turn blue, hypertension, hypotension, syncope, history of heart murmur, pacemaker, few scattered varicosities, sleep disturbances due to breathing, exercise intolerance and edema.   Gastrointestinal:  Negative for heartburn, nausea, vomiting, abdominal pain, diarrhea, constipation, blood in stool, melena, rectal pain, bloating, hemorrhoids, bowel incontinence, jaundice, rectal bleeding, coffee ground emesis and change in stool.   Genitourinary:  Positive for bladder incontinence. Negative for dysuria, urgency, hematuria, flank pain, vaginal discharge, difficulty urinating, genital sores, dyspareunia, decreased libido, nocturia, voiding less frequently, arousal difficulty, abnormal vaginal bleeding, excessive menstruation, menstrual changes, hot flashes, vaginal dryness and postmenopausal bleeding.   Musculoskeletal:  Positive for myalgias, back pain, arthralgias and muscle cramps. Negative for joint swelling, stiffness, neck  pain, bone pain, muscle weakness and fracture.   Skin:  Negative for nail changes, itching, poor wound healing, rash, hair changes, skin changes, acne, warts, poor wound healing, scarring, flaky skin, Raynaud's phenomenon, sensitivity to sunlight and skin thickening.   Neurological:  Positive for light-headedness. Negative for dizziness, tingling, tremors, speech change, seizures, loss of consciousness, weakness, numbness, headaches, disturbances in coordination, extremity numbness, memory loss, difficulty walking and paralysis.   Endo/Heme:  Negative for anemia, swollen glands and bruises/bleeds easily.   Psychiatric/Behavioral:  Negative for depression, hallucinations, memory loss, decreased concentration, mood swings and panic attacks.    Breast:  Negative for breast discharge, breast mass, breast pain and nipple retraction.   Endocrine:  Negative for altered temperature regulation, polyphagia, polydipsia, unwanted hair growth and change in facial hair.      Past Medical History:    Past Medical History:   Diagnosis Date     Anxiety state, unspecified     [id number]     Arthritis 2014    vague, gradual, not treated really, knees feel it     At high risk for breast cancer 09/27/2019    30.3% lifetime risk by SHERON model     Attention deficit disorder without mention of hyperactivity      Cancer (H) 7/2017 and 1/2018    ovarian and kidney cancers, both treated well     Chronic rhinitis      Depressive disorder lifetime    plus Anxiety plus ADD. Escitalipram, therapy, ADD meds maybe     Depressive disorder, not elsewhere classified      Heart disease 1999    tachycardia and high cholesterol, managed     History of blood transfusion 7/2017    while in hospital for ovarian cancer     Hypertension 1999    tho BP was too LOW last week. past 12 years Generally OK     Panic disorder without agoraphobia      Pure hypercholesterolemia     Lipitor     Tachycardia      Tachycardia, unspecified     9/1999-2/2004     Type II or  unspecified type diabetes mellitus without mention of complication, not stated as uncontrolled     diagnosed 2004         Past Surgical History:    Past Surgical History:   Procedure Laterality Date     AS REMV KIDNEY,RADICAL Right      BIOPSY  7/2017 and 2/2018    ovarian and kidney cancer biopsies. also 1987 breast benign     BREAST SURGERY  1987    date unsure. benign breast cyst removed     CATARACT IOL, RT/LT Left 05/18/2018     CATARACT IOL, RT/LT Right 07/1318     COLONOSCOPY  2008 and 2013    polyps removed. Next due fall 2018     HYSTERECTOMY TOTAL ABDOMINAL, BILATERAL SALPINGO-OOPHORECTOMY, NODE DISSECTION, COMBINED Left 7/7/2017    Procedure: COMBINED HYSTERECTOMY TOTAL ABDOMINAL, SALPINGO-OOPHORECTOMY, NODE DISSECTION;  Exploratory Laparotomy, Left Salpingo-Oophorectomy, Cancer Staging, Total Hysterectomy, Omentectomy, Evacuation of abdominal fluid, Lymph Node Dissection  Anesthesia Block ;  Surgeon: Serena Cole MD;  Location: [facility] OR     HYSTERECTOMY, PAP NO LONGER INDICATED  07/07/2017    Laparotomy; for ovarian cancer staging     HYSTERECTOMY, PAP NO LONGER INDICATED       INSERT PORT VASCULAR ACCESS Right 8/21/2017    Procedure: INSERT PORT VASCULAR ACCESS;  Single Lumen Chest Power Port;  Surgeon: Stephen Mike PA-C;  Location: [facility] OR     LYMPHADENECTOMY RETROPERITONEAL Bilateral 07/07/2017    Laparotomy; pelvics & para-aortics; for ovarian cancer staging     OMENTECTOMY  07/07/2017    Laparotomy; for ovarian cancer staging     ORTHOPEDIC SURGERY  1/2002    broken left jaw due to fall, 4 fractures, titanium plates     PHACOEMULSIFICATION CLEAR CORNEA WITH STANDARD INTRAOCULAR LENS IMPLANT Right 7/13/2018    Procedure: PHACOEMULSIFICATION CLEAR CORNEA WITH STANDARD INTRAOCULAR LENS IMPLANT;  RIght Eye Phacoemulsification Clear Cornea with Standard Intraocular Lens Placement ;  Surgeon: Mary Jo Massey MD;  Location: [facility] OR     PHACOEMULSIFICATION CLEAR CORNEA WITH TORIC INTRAOCULAR  LENS IMPLANT Left 5/18/2018    Procedure: PHACOEMULSIFICATION CLEAR CORNEA WITH TORIC INTRAOCULAR LENS IMPLANT;  Left Eye Phacoemulsification with Toric Lens;  Surgeon: Mary Jo Massey MD;  Location: [facility] OR     SALPINGO OOPHORECTOMY,R/L/KAYY Right 2008    Salpingo Oophorectomy, RT     SALPINGO OOPHORECTOMY,R/L/KAYY Left 07/07/2017    Laparotomy; for ovarian cancer staging     SURGICAL HISTORY OF -       facial surgery d/t fall in 1/2002     SURGICAL HISTORY OF -       1985 removal of breast cyst     SURGICAL HISTORY OF -   2008    endometrial ablation     YAG CAPSULOTOMY OD (RIGHT EYE)  10/22/2018         Health Maintenance Due   Topic Date Due     ZOSTER IMMUNIZATION (1 of 2) Never done     EYE EXAM  11/28/2019     DIABETIC FOOT EXAM  10/01/2020     BMP  03/27/2021     A1C  06/01/2021     PHQ-9  06/01/2021       Current Medications:     Current Outpatient Medications   Medication Sig Dispense Refill     Acetaminophen (TYLENOL PO) Take 500 mg by mouth 2 tabs prn pain (monthly)       atorvastatin (LIPITOR) 80 MG tablet TAKE 1 TABLET(80 MG) BY MOUTH DAILY 90 tablet 3     B Complex Vitamins (VITAMIN B-COMPLEX PO) Take 50 mg by mouth        CALCIUM-MAGNESIUM-VITAMIN D PO Take 2 tablets by mouth daily       cephALEXin (KEFLEX) 500 MG capsule Take 1 capsule (500 mg) by mouth 3 times daily 21 capsule 0     Cholecalciferol (VITAMIN D) 1000 UNIT capsule Take 2,000 Units by mouth daily        DiphenhydrAMINE HCl (BENADRYL PO) Take 50 mg by mouth daily as needed (monthly)       EPINEPHrine (EPIPEN/ADRENACLICK/OR ANY BX GENERIC EQUIV) 0.3 MG/0.3ML injection 2-pack Inject 0.3 mLs (0.3 mg) into the muscle once as needed for anaphylaxis 0.3 mL PRN     escitalopram (LEXAPRO) 20 MG tablet TAKE 1 TABLET(20 MG) BY MOUTH DAILY 90 tablet 1     fluconazole (DIFLUCAN) 150 MG tablet Take 1 tablet (150 mg) by mouth daily Take additional tablet 72 hours after first if symptoms persist. 2 tablet 1     fluticasone (FLONASE) 50 MCG/ACT nasal  spray SHAKE LIQUID AND USE 2 SPRAYS IN EACH NOSTRIL DAILY 48 g 3     LORazepam (ATIVAN) 1 MG tablet Take 1 tablet (1 mg) by mouth every 6 hours as needed (nausea/vomiting, anxiety or sleep) 30 tablet 1     Magnesium Hydroxide (MAGNESIA PO) Take 500 mg by mouth       metFORMIN (GLUCOPHAGE-XR) 500 MG 24 hr tablet TAKE 2 TABLETS BY MOUTH EVERY DAY WITH THE EVENING MEAL 180 tablet 1     Multiple Vitamins-Minerals (MULTIVITAMIN ADULT PO) Take 1 tablet by mouth daily       nitroFURantoin macrocrystal-monohydrate (MACROBID) 100 MG capsule Take one capsule after intercourse as needed 30 capsule PRN     Probiotic Product (PRO-BIOTIC BLEND PO) Take 1 capsule by mouth daily Enzymatic Therapy-probiotic pearls       rivaroxaban ANTICOAGULANT (XARELTO) 20 MG TABS tablet Take 1 tablet (20 mg) by mouth daily (with dinner) 90 tablet 4     tamoxifen (NOLVADEX) 10 MG tablet Cut each pill in 1/2 with pill cutter to ensure 5 mg daily dose. 45 tablet 3     traZODone (DESYREL) 50 MG tablet TAKE 1 TABLET  BY MOUTH EVERY NIGHT AS NEEDED FOR SLEEP 180 tablet PRN     tamoxifen (NOLVADEX) 10 MG tablet Take 0.5 tablets (5 mg) by mouth daily Cut each pill in 1/2 with pill cutter to ensure 5 mg daily dose. 45 tablet 1         Allergies:        Allergies   Allergen Reactions     Paclitaxel Anaphylaxis and Difficulty breathing     Wasps [Hornets] Anaphylaxis     Yellow Hornet Venom [Hornet Venom] Anaphylaxis and Swelling     Yellow Jacket Venom [Venomil Honey Bee] Anaphylaxis and Swelling     Sulfa Drugs Hives and Rash        Social History:     Social History     Tobacco Use     Smoking status: Never Smoker     Smokeless tobacco: Never Used   Substance Use Topics     Alcohol use: Yes     Comment: Very infrequent, a bit of wine or beer 2x per month maybe       History   Drug Use     Types: Marijuana     Comment: infrequent, for celebrations only, maybe 8x per year         Family History:     The patient's family history is notable for:     Family  History   Problem Relation Age of Onset     C.A.D. Father      Diabetes Father         Later in his life, in his 70s     Hypertension Father      Cerebrovascular Disease Father         1984 or      Psychotic Disorder Father      Pancreatic Cancer Father      Coronary Artery Disease Father         diagnosed in his late 50s (age)     Hyperlipidemia Father         treated w statins     Other Cancer Father         pancreatic, ,  of this     Depression Father      Mental Illness Father         likely PTSD and depression     Obesity Father      C.A.D. Mother      Hypertension Mother      Breast Cancer Mother         2 times,  and ?     Psychotic Disorder Mother      Colon Cancer Mother         ?     Cancer Mother         Bone cancer     Coronary Artery Disease Mother         diagnosed in her late 70s (age)     Hyperlipidemia Mother         treated w. statins     Other Cancer Mother         bone/marrow, ,  of this     Depression Mother      Anxiety Disorder Mother      Mental Illness Mother         various undiagnosed types, but THERE     Obesity Mother      Prostate Problems Brother         cleared      Hypertension Brother      Hyperlipidemia Brother         unsure if treated w statins     Depression Brother      Anxiety Disorder Brother      Mental Illness Brother         undiagnosed but THERE     Obesity Brother      Psychotic Disorder Sister         x2     Neurologic Disorder Sister      Hypertension Sister      Hyperlipidemia Sister         treated w statins     Depression Sister      Anxiety Disorder Sister      Obesity Sister      Psychotic Disorder Brother         x2     Depression Brother         major depressive disorder and OCD     Anxiety Disorder Brother      Mental Illness Brother         not sure of diagnoses, treated     Hypertension Brother      Hyperlipidemia Brother      Psychotic Disorder Maternal Grandmother         ?     Coronary Artery Disease Maternal Grandmother           of heart attack age 84?     Depression Maternal Grandmother      Psychotic Disorder Maternal Grandfather         ?     Psychotic Disorder Paternal Grandmother         Schizophernia     Coronary Artery Disease Paternal Grandmother          of heart attack, age 85?     Depression Paternal Grandmother         severe, but recovered     Mental Illness Paternal Grandmother         possible bipolar or other     Psychotic Disorder Paternal Grandfather         ?     Respiratory Paternal Grandfather          of emphysema and smoking     Psychotic Disorder Sister      Other - See Comments Sister         small kidney stone      Hypertension Sister      Hyperlipidemia Sister         treated w statins     Depression Sister      Anxiety Disorder Sister      Cancer - colorectal No family hx of      Glaucoma No family hx of      Macular Degeneration No family hx of          Physical Exam:     /78   Pulse 103   Resp 18   Wt 96.3 kg (212 lb 4.8 oz)   LMP 2009   SpO2 95%   BMI 37.71 kg/m    Body mass index is 37.71 kg/m .    General Appearance: healthy and alert, no distress     HEENT: no thyromegaly, no palpable nodules or masses        Cardiovascular: regular rate and rhythm, no gallops, rubs or murmurs     Respiratory: lungs clear, no rales, rhonchi or wheezes    Musculoskeletal: extremities non tender with 1+ BL LE edema     Skin:  no lesions or rashes    Neurological: normal gait, no gross defects     Psychiatric: appropriate mood and affect                               Hematological: normal cervical, supraclavicular and inguinal lymph nodes     Gastrointestinal:       abdomen soft, non-tender, non-distended, no organomegaly or masses    Genitourinary: External genitalia and urethral meatus appears normal. Small skin tear at superior right inner labia majora with a small amount of bleeding, no erythema, or tenderness. Small scar on left inner vulva well healed without drainage, erythema,  tenderness. Vagina with large white clump-like discharge adherent to vaginal walls. Vagina is smooth without nodularity or masses. Cervix surgically absent.  Bimanual exam reveal no masses, nodularity or fullness. Recto-vaginal exam confirms these findings. Dr. Medina present for pelvic exam.       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 She completed treatment with surgery and three cycles of chemotherapy on 10/16/17. She is here today for an acute visit.    A total of 30 minutes was spent with the patient, over 50% minutes of which were spent in counseling the patient and/or treatment planning.      Plan:     1.)         ASHISH on exam. Vaginal yeast present on exam. Will treat with PO diflucan with repeat in 72 hours of tablet. Will Rx Nystatin ointment for vulvar itching and Nystatin powder for periods of yeast under breast and pannus. Three years out from treatment, may continue surveillance every six months x3 years (through 10/2022) then annually thereafter with  and pelvic/rectal exam. Continue q6wk port flushes. Reviewed signs and symptoms for when she should contact the clinic or seek additional care. Patient to contact the clinic with any questions or concerns in the interim. Keep follow up appt in October 2.) Genetic testing: Hafsa is negative for mutations in the AIP, ALK, APC, COSTA, BAP1, BARD1, BLM, BRCA1, BRCA2, BRIP1, BMPR1A, CDH1, CDK4, CDKN1B, CDKN2A, CHEK2, DICER1, EPCAM, FANCC, FH, FLCN, GALNT12, GREM1, HOXB13, MAX, MEN1, MET, MITF, MLH1, MRE11A, MSH2, MSH6, MUTYH, NBN, NF1, NF2, PALB2, PHOX2B, PMS2, POLD1, POLE, POT1, EZYZM3R, PTCH1, PTEN, RAD50, RAD51C, RAD51D, RB1, RET, SDHA, SDHAF2, SDHB, SDHC, SDHD, SMAD4, SMARCA4, SMARCB1, SMARCE1, STK11, SUFU, LATN042, TP53, TSC1, TSC2, VHL, and XRCC2 genes. No mutations were found in any of the 67 genes analyzed. High risk breast screening. Sees [facility]. On low dose  tamoxifen since 10/10/2019 for prevention of breast cancer.     3.) Labs and/or tests ordered include:  None     4.) Health maintenance issues addressed today include annual health maintenance and non-gynecologic issues with PCP.     5.)        Lung nodules: stable for two years on CT considered statistically benign and no further imaging needed unless new symptoms.     6.)        Urinary incontinence: stress and urge. Undergoing pelvic floor PT and reports improvements. Angiomyolipoma of kidney. Patient has upcoming Urology appt with Dr. Mercado.     8.)        Rx: Diflucan (rx sent 6/9/2021), Nystatin powder and ointment        MARLON Bradshaw, NP-BC  Women's Health Nurse Practitioner  Division of Gynecologic Oncology  [facility]    CC  Patient Care Team:  Morelia Ayon NP as PCP - Karla Perez RN as Continuity Care Coordinator (Gynecologic Oncology)  Nabeel Dick MD as MD (Urology)  Javier Gregorio MD as MD (Urology)  Lola Vasquez, PhD LP as Psychologist (Psychology)  Iris Dalton APRN CNP as Assigned OBGYN Provider  Jarod Shaffer MD as Assigned Cancer Care Provider  Morelia Ayon NP as Assigned PCP  Leland Mercado MD as MD (Urology)  Dayanna Clayton, RN as Registered Nurse (Oncology)

## 2021-06-10 NOTE — PROGRESS NOTES
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 She completed treatment with surgery and three cycles of chemotherapy on 10/16/17. She is here today for an acute visit.     Oncology History:  The patient has had a recent episode of lower abdominal pain in early July.  She was subsequently sent to the emergency room and was found to have a large 9 cm complex ovarian mass. She was admitted to the hospital for pain control.  7/3/17:  1187  17: Exploratory laparotomy, total abdominal hysterectomy, left salpingo-oophorectomy, cancer staging including infracolic omentectomy, bilateral pelvic & para-aortic lymphadenectomy, peritoneal biopsies, evacuation of pelvic fluid by Dr. Cole.    FINAL DIAGNOSIS:   A. LEFT OVARY AND FALLOPIAN TUBE, LEFT SALPINGO-OOPHORECTOMY:   - Ovarian clear cell carcinoma   - Background of endometriosis   - Fallopian tube with no significant histologic abnormality.   B. UTERUS, HYSTERECTOMY:   -  Inactive endometrium   -  Myometrium with adenomyosis and leiomyoma.   -  Cervix with squamous metaplasia.   C. PERITONEUM, RIGHT PELVIS, BIOPSY:   - Fibroadipose tissue, negative for malignancy.   D. LYMPH NODES, RIGHT PELVIC, DISSECTION:   - Thirteen benign lymph nodes (0/13).   E. LYMPH NODES, LEFT PELVIC, DISSECTION:   - Seven benign lymph nodes (0/7).   F. PERITONEUM, LEFT PELVIS, BIOPSY:   - Fibroadipose tissue, negative for malignancy.   G. PERITONEUM, BLADDER, BIOPSY:   - Fibroadipose tissue  with acute and chronic inflammation, negative for malignancy.   H. PERITONEUM, POSTERIOR CUL-DE-SAC, BIOPSY:   - Fibroadipose tissue, negative for malignancy.   I. PERITONEUM, LEFT PARACOLIC GUTTER, BIOPSY:   - Fibroadipose tissue, negative for malignancy.   J. LYMPH NODES, LEFT PARA-AORTIC, DISSECTION:   - Five  benign lymph nodes (0/5).   K. LYMPH NODES, RIGHT PARA-AORTIC, DISSECTION:   - Two benign lymph nodes (0/2).   L. PERITONEUM, RIGHT PARACOLIC GUTTER, BIOPSY:   - Fibroadipose tissue, negative for malignancy.   M. OMENTUM, OMENTECTOMY:   - Adipose tissue with reactive changes, negative for malignancy.   COMMENT:   The final diagnosis confirms the interpretation provided intraoperatively.   Report Name: Ovary or Fallopian Tube   Status: Submitted   Part(s) Involved:   A: Ovary and fallopian tube, left   Synoptic Report:   CLINICAL   Clinical History:   - Pelvic mass   SPECIMEN   Procedure:   - Left salpingo-oophorectomy   - Hysterectomy   - Omentectomy   - Peritoneal  biopsies   Lymph Node Sampling:   - Performed   Location:   - Pelvic lymph nodes   - Para-aortic lymph nodes   Specimen Integrity:   - Left ovary   Specimen Integrity of Left Ovary:   - Capsule intact   TUMOR   Primary Tumor Site(s):   - Left ovary   Left Ovary   Tumor Size of Left Ovary: 19 cm   Histologic Type:   - Clear cell carcinoma   Tumor Extent   Ovarian Surface Involvement:   - Absent   Specimen(s)   Extent of Left Ovary:   - Involved   Extent of Left Fallopian Tube:   - Not involved   Extent of Omentum:   - Not involved   Extent of Uterus:   - Not involved   Extent of Peritoneum:   - Not involved   Peritoneal Ascitic Fluid:   - Not performed / unknown   Pleural Fluid:   - Not performed / unknown   LYMPH NODES     Number of Pelvic Lymph Nodes Examined: 20     Number of Pelvic Lymph Nodes Involved: None identified     Number of Para-aortic Lymph Nodes Examined: 7     Number of Para-Aortic Lymph Nodes Involved: None identified   STAGE (PTNM, AJCC 7TH ED.)   Primary Tumor (pT):   - Ovary   Ovarian Primary Tumor (pT):   - pT1a: Tumor limited to 1 ovary; capsule intact, no tumor on ovarian surface. No malignant cells in ascites or peritoneal washings#   Regional Lymph Nodes (pN):   - pN0: No regional lymph node metastasis       07/31/17:   Edmund follow up: Discussed with the patient that given the high risk histology, as well as ruptured mass before surgery, she would be qualified at higher risk of recurrence despite early stage ovarian cancer.  Recommended adjuvant chemotherapy. Plan for carboplatin of AUC of 6 and paclitaxel of 175, m2 for 3 cycles. Referral for genetic counselor and will see her back after those 3 cycles  08/03/17: Call from patient stating she is going for a second opinion and would like chemotherapy rescheduled to week of 8/14/17.      08/16/17: Cycle #1 Carbo/Taxol.  73. Significant paclitaxel reaction.  08/30/17: C1 carboplatin/inpatient paclitaxel desensitization.   09/25/17: C2 carboplatin/paclitaxel- inpatient paclitaxel desensitization.  15.  10/16/17: C3 carboplatin/paclitaxel- switched to docetaxel given her neuropathy.  10.  11/14/17:  8. CT CAP:  IMPRESSION:   1. Post surgical changes of hysterectomy and bilateral  salpingo-oophorectomy. Nodular soft tissue density in the posterior  cul-de-sac along with ill-defined nodular thickening in the left  pelvis, suspicious for residual disease or metastatic deposits.   2. There is a 3 cm mass in the superior pole of the right kidney,  possibly extending into the renal hilum. Represents RCC until proven  otherwise. Consider renal mass protocol CT to assess renal vein  involvement.  3. Stable sub-4 mm pulmonary nodules, continued attention on  follow-up.     11/16/17: CT renal mass protocol  IMPRESSION:  1. Arterially enhancing mass measuring 3.6 x 2.1 x 2.2 cm mass arising  from superior posterior of the right kidney, this is renal cell  carcinoma until proven otherwise.  2. Stable fat-containing umbilical hernia.     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 Margins negative. Three month surveillance plan with [facility].     2/26/18:  14     5/22/18:  11      8/23/18:  11     12/12/18:   10     3/8/19:  12     6/19/19:  13     9/23/19:  12    11/8/19: CT Chest:   IMPRESSION:   1. Stable, sub-4 mm pulmonary nodules as above.  2. Stable hypodense, subcentimeter nodules in the thyroid.     3/27/20:  14    10/1/20:  15    3/29/21:  10          Today Hafsa comes to the clinic reporting ongoing ~ three weeks vulvar itching in and around her urethra and introitus. She does notice a thick white discharge and a possible slight odor. She is sexually active with her boyfriend and is using a vibrator with intercourse. She does urinate after intercourse. She denies UTI symptoms. She has been using Monistat cream intermittently over the past few days. She has not picked up the Diflucan rx. She is doing Pelvic floor PT and has noticed and improvement with her urinary leakage.  She continues to take nitrofurantoin as post-coital for UTI prophylaxis. She is on Xarelto daily for hx of DVT. She still has her port in place, having this flushed infrequently- does not want it out yet due to fear of recurrence and the possible need for the port in the future. She denies any vaginal bleeding, no changes in her bowel or bladder habits, no nausea/emesis, and no difficulties eating. She denies any abdominal discomfort/bloating, no fevers or chills, and no chest pain or shortness of breath. She up to date on breast screening (high risk screening through [facility]).  She is up to date on her colonoscopy (1/6/20). She has had both COVID vaccines.           Review of Systems     Constitutional:  Positive for weight gain, fatigue and height loss. Negative for fever, chills, weight loss, decreased appetite, night sweats, recent stressors, post-operative complications, incisional pain, hallucinations, increased energy, hyperactivity and confused.   HENT:  Positive for nosebleeds, sinus pain and sinus congestion. Negative for ear pain, hearing loss, tinnitus, trouble swallowing, hoarse  voice, mouth sores, sore throat, ear discharge, tooth pain, gum tenderness, taste disturbance, smell disturbance, hearing aid, bleeding gums, dry mouth and neck mass.    Eyes:  Negative for double vision, pain, redness, eye pain, decreased vision, eye watering, eye bulging, eye dryness, flashing lights, spots, floaters, strabismus, tunnel vision, jaundice and eye irritation.   Respiratory:   Negative for cough, hemoptysis, sputum production, shortness of breath, wheezing, sleep disturbances due to breathing, snores loudly, respiratory pain, dyspnea on exertion, cough disturbing sleep and postural dyspnea.    Cardiovascular:  Positive for palpitations, leg pain and light-headedness. Negative for chest pain, dyspnea on exertion, orthopnea, fingers/toes turn blue, hypertension, hypotension, syncope, history of heart murmur, pacemaker, few scattered varicosities, sleep disturbances due to breathing, exercise intolerance and edema.   Gastrointestinal:  Negative for heartburn, nausea, vomiting, abdominal pain, diarrhea, constipation, blood in stool, melena, rectal pain, bloating, hemorrhoids, bowel incontinence, jaundice, rectal bleeding, coffee ground emesis and change in stool.   Genitourinary:  Positive for bladder incontinence. Negative for dysuria, urgency, hematuria, flank pain, vaginal discharge, difficulty urinating, genital sores, dyspareunia, decreased libido, nocturia, voiding less frequently, arousal difficulty, abnormal vaginal bleeding, excessive menstruation, menstrual changes, hot flashes, vaginal dryness and postmenopausal bleeding.   Musculoskeletal:  Positive for myalgias, back pain, arthralgias and muscle cramps. Negative for joint swelling, stiffness, neck pain, bone pain, muscle weakness and fracture.   Skin:  Negative for nail changes, itching, poor wound healing, rash, hair changes, skin changes, acne, warts, poor wound healing, scarring, flaky skin, Raynaud's phenomenon, sensitivity to sunlight  and skin thickening.   Neurological:  Positive for light-headedness. Negative for dizziness, tingling, tremors, speech change, seizures, loss of consciousness, weakness, numbness, headaches, disturbances in coordination, extremity numbness, memory loss, difficulty walking and paralysis.   Endo/Heme:  Negative for anemia, swollen glands and bruises/bleeds easily.   Psychiatric/Behavioral:  Negative for depression, hallucinations, memory loss, decreased concentration, mood swings and panic attacks.    Breast:  Negative for breast discharge, breast mass, breast pain and nipple retraction.   Endocrine:  Negative for altered temperature regulation, polyphagia, polydipsia, unwanted hair growth and change in facial hair.      Past Medical History:    Past Medical History:   Diagnosis Date     Anxiety state, unspecified     [id number]     Arthritis 2014    vague, gradual, not treated really, knees feel it     At high risk for breast cancer 09/27/2019    30.3% lifetime risk by SHERON model     Attention deficit disorder without mention of hyperactivity      Cancer (H) 7/2017 and 1/2018    ovarian and kidney cancers, both treated well     Chronic rhinitis      Depressive disorder lifetime    plus Anxiety plus ADD. Escitalipram, therapy, ADD meds maybe     Depressive disorder, not elsewhere classified      Heart disease 1999    tachycardia and high cholesterol, managed     History of blood transfusion 7/2017    while in hospital for ovarian cancer     Hypertension 1999    tho BP was too LOW last week. past 12 years Generally OK     Panic disorder without agoraphobia      Pure hypercholesterolemia     Lipitor     Tachycardia      Tachycardia, unspecified     9/1999-2/2004     Type II or unspecified type diabetes mellitus without mention of complication, not stated as uncontrolled     diagnosed 2004         Past Surgical History:    Past Surgical History:   Procedure Laterality Date     AS REMV KIDNEY,RADICAL Right      BIOPSY   7/2017 and 2/2018    ovarian and kidney cancer biopsies. also 1987 breast benign     BREAST SURGERY  1987    date unsure. benign breast cyst removed     CATARACT IOL, RT/LT Left 05/18/2018     CATARACT IOL, RT/LT Right 07/1318     COLONOSCOPY  2008 and 2013    polyps removed. Next due fall 2018     HYSTERECTOMY TOTAL ABDOMINAL, BILATERAL SALPINGO-OOPHORECTOMY, NODE DISSECTION, COMBINED Left 7/7/2017    Procedure: COMBINED HYSTERECTOMY TOTAL ABDOMINAL, SALPINGO-OOPHORECTOMY, NODE DISSECTION;  Exploratory Laparotomy, Left Salpingo-Oophorectomy, Cancer Staging, Total Hysterectomy, Omentectomy, Evacuation of abdominal fluid, Lymph Node Dissection  Anesthesia Block ;  Surgeon: Serena Cole MD;  Location: [facility] OR     HYSTERECTOMY, PAP NO LONGER INDICATED  07/07/2017    Laparotomy; for ovarian cancer staging     HYSTERECTOMY, PAP NO LONGER INDICATED       INSERT PORT VASCULAR ACCESS Right 8/21/2017    Procedure: INSERT PORT VASCULAR ACCESS;  Single Lumen Chest Power Port;  Surgeon: Stephen Mike PA-C;  Location: [facility] OR     LYMPHADENECTOMY RETROPERITONEAL Bilateral 07/07/2017    Laparotomy; pelvics & para-aortics; for ovarian cancer staging     OMENTECTOMY  07/07/2017    Laparotomy; for ovarian cancer staging     ORTHOPEDIC SURGERY  1/2002    broken left jaw due to fall, 4 fractures, titanium plates     PHACOEMULSIFICATION CLEAR CORNEA WITH STANDARD INTRAOCULAR LENS IMPLANT Right 7/13/2018    Procedure: PHACOEMULSIFICATION CLEAR CORNEA WITH STANDARD INTRAOCULAR LENS IMPLANT;  RIght Eye Phacoemulsification Clear Cornea with Standard Intraocular Lens Placement ;  Surgeon: Mary Jo Massey MD;  Location: [facility] OR     PHACOEMULSIFICATION CLEAR CORNEA WITH TORIC INTRAOCULAR LENS IMPLANT Left 5/18/2018    Procedure: PHACOEMULSIFICATION CLEAR CORNEA WITH TORIC INTRAOCULAR LENS IMPLANT;  Left Eye Phacoemulsification with Toric Lens;  Surgeon: Mary Jo Massey MD;  Location: [facility] OR     SALPINGO OOPHORECTOMY,R/L/KAYY  Right 2008    Salpingo Oophorectomy, RT     SALPINGO OOPHORECTOMY,R/L/KAYY Left 07/07/2017    Laparotomy; for ovarian cancer staging     SURGICAL HISTORY OF -       facial surgery d/t fall in 1/2002     SURGICAL HISTORY OF -       1985 removal of breast cyst     SURGICAL HISTORY OF -   2008    endometrial ablation     YAG CAPSULOTOMY OD (RIGHT EYE)  10/22/2018         Health Maintenance Due   Topic Date Due     ZOSTER IMMUNIZATION (1 of 2) Never done     EYE EXAM  11/28/2019     DIABETIC FOOT EXAM  10/01/2020     BMP  03/27/2021     A1C  06/01/2021     PHQ-9  06/01/2021       Current Medications:     Current Outpatient Medications   Medication Sig Dispense Refill     Acetaminophen (TYLENOL PO) Take 500 mg by mouth 2 tabs prn pain (monthly)       atorvastatin (LIPITOR) 80 MG tablet TAKE 1 TABLET(80 MG) BY MOUTH DAILY 90 tablet 3     B Complex Vitamins (VITAMIN B-COMPLEX PO) Take 50 mg by mouth        CALCIUM-MAGNESIUM-VITAMIN D PO Take 2 tablets by mouth daily       cephALEXin (KEFLEX) 500 MG capsule Take 1 capsule (500 mg) by mouth 3 times daily 21 capsule 0     Cholecalciferol (VITAMIN D) 1000 UNIT capsule Take 2,000 Units by mouth daily        DiphenhydrAMINE HCl (BENADRYL PO) Take 50 mg by mouth daily as needed (monthly)       EPINEPHrine (EPIPEN/ADRENACLICK/OR ANY BX GENERIC EQUIV) 0.3 MG/0.3ML injection 2-pack Inject 0.3 mLs (0.3 mg) into the muscle once as needed for anaphylaxis 0.3 mL PRN     escitalopram (LEXAPRO) 20 MG tablet TAKE 1 TABLET(20 MG) BY MOUTH DAILY 90 tablet 1     fluconazole (DIFLUCAN) 150 MG tablet Take 1 tablet (150 mg) by mouth daily Take additional tablet 72 hours after first if symptoms persist. 2 tablet 1     fluticasone (FLONASE) 50 MCG/ACT nasal spray SHAKE LIQUID AND USE 2 SPRAYS IN EACH NOSTRIL DAILY 48 g 3     LORazepam (ATIVAN) 1 MG tablet Take 1 tablet (1 mg) by mouth every 6 hours as needed (nausea/vomiting, anxiety or sleep) 30 tablet 1     Magnesium Hydroxide (MAGNESIA PO) Take  500 mg by mouth       metFORMIN (GLUCOPHAGE-XR) 500 MG 24 hr tablet TAKE 2 TABLETS BY MOUTH EVERY DAY WITH THE EVENING MEAL 180 tablet 1     Multiple Vitamins-Minerals (MULTIVITAMIN ADULT PO) Take 1 tablet by mouth daily       nitroFURantoin macrocrystal-monohydrate (MACROBID) 100 MG capsule Take one capsule after intercourse as needed 30 capsule PRN     Probiotic Product (PRO-BIOTIC BLEND PO) Take 1 capsule by mouth daily Enzymatic Therapy-probiotic pearls       rivaroxaban ANTICOAGULANT (XARELTO) 20 MG TABS tablet Take 1 tablet (20 mg) by mouth daily (with dinner) 90 tablet 4     tamoxifen (NOLVADEX) 10 MG tablet Cut each pill in 1/2 with pill cutter to ensure 5 mg daily dose. 45 tablet 3     traZODone (DESYREL) 50 MG tablet TAKE 1 TABLET  BY MOUTH EVERY NIGHT AS NEEDED FOR SLEEP 180 tablet PRN     tamoxifen (NOLVADEX) 10 MG tablet Take 0.5 tablets (5 mg) by mouth daily Cut each pill in 1/2 with pill cutter to ensure 5 mg daily dose. 45 tablet 1         Allergies:        Allergies   Allergen Reactions     Paclitaxel Anaphylaxis and Difficulty breathing     Wasps [Hornets] Anaphylaxis     Yellow Hornet Venom [Hornet Venom] Anaphylaxis and Swelling     Yellow Jacket Venom [Venomil Honey Bee] Anaphylaxis and Swelling     Sulfa Drugs Hives and Rash        Social History:     Social History     Tobacco Use     Smoking status: Never Smoker     Smokeless tobacco: Never Used   Substance Use Topics     Alcohol use: Yes     Comment: Very infrequent, a bit of wine or beer 2x per month maybe       History   Drug Use     Types: Marijuana     Comment: infrequent, for celebrations only, maybe 8x per year         Family History:     The patient's family history is notable for:     Family History   Problem Relation Age of Onset     C.A.D. Father      Diabetes Father         Later in his life, in his 70s     Hypertension Father      Cerebrovascular Disease Father         March 1984 or 85     Psychotic Disorder Father       Pancreatic Cancer Father      Coronary Artery Disease Father         diagnosed in his late 50s (age)     Hyperlipidemia Father         treated w statins     Other Cancer Father         pancreatic, ,  of this     Depression Father      Mental Illness Father         likely PTSD and depression     Obesity Father      C.A.D. Mother      Hypertension Mother      Breast Cancer Mother         2 times,  and ?     Psychotic Disorder Mother      Colon Cancer Mother         2009?     Cancer Mother         Bone cancer     Coronary Artery Disease Mother         diagnosed in her late 70s (age)     Hyperlipidemia Mother         treated w. statins     Other Cancer Mother         bone/marrow, ,  of this     Depression Mother      Anxiety Disorder Mother      Mental Illness Mother         various undiagnosed types, but THERE     Obesity Mother      Prostate Problems Brother         cleared      Hypertension Brother      Hyperlipidemia Brother         unsure if treated w statins     Depression Brother      Anxiety Disorder Brother      Mental Illness Brother         undiagnosed but THERE     Obesity Brother      Psychotic Disorder Sister         x2     Neurologic Disorder Sister      Hypertension Sister      Hyperlipidemia Sister         treated w statins     Depression Sister      Anxiety Disorder Sister      Obesity Sister      Psychotic Disorder Brother         x2     Depression Brother         major depressive disorder and OCD     Anxiety Disorder Brother      Mental Illness Brother         not sure of diagnoses, treated     Hypertension Brother      Hyperlipidemia Brother      Psychotic Disorder Maternal Grandmother         ?     Coronary Artery Disease Maternal Grandmother          of heart attack age 84?     Depression Maternal Grandmother      Psychotic Disorder Maternal Grandfather         ?     Psychotic Disorder Paternal Grandmother         Schizophernia     Coronary Artery Disease Paternal  Grandmother          of heart attack, age 85?     Depression Paternal Grandmother         severe, but recovered     Mental Illness Paternal Grandmother         possible bipolar or other     Psychotic Disorder Paternal Grandfather         ?     Respiratory Paternal Grandfather          of emphysema and smoking     Psychotic Disorder Sister      Other - See Comments Sister         small kidney stone      Hypertension Sister      Hyperlipidemia Sister         treated w statins     Depression Sister      Anxiety Disorder Sister      Cancer - colorectal No family hx of      Glaucoma No family hx of      Macular Degeneration No family hx of          Physical Exam:     /78   Pulse 103   Resp 18   Wt 96.3 kg (212 lb 4.8 oz)   LMP 2009   SpO2 95%   BMI 37.71 kg/m    Body mass index is 37.71 kg/m .    General Appearance: healthy and alert, no distress     HEENT: no thyromegaly, no palpable nodules or masses        Cardiovascular: regular rate and rhythm, no gallops, rubs or murmurs     Respiratory: lungs clear, no rales, rhonchi or wheezes    Musculoskeletal: extremities non tender with 1+ BL LE edema     Skin:  no lesions or rashes    Neurological: normal gait, no gross defects     Psychiatric: appropriate mood and affect                               Hematological: normal cervical, supraclavicular and inguinal lymph nodes     Gastrointestinal:       abdomen soft, non-tender, non-distended, no organomegaly or masses    Genitourinary: External genitalia and urethral meatus appears normal. Small skin tear at superior right inner labia majora with a small amount of bleeding, no erythema, or tenderness. Small scar on left inner vulva well healed without drainage, erythema, tenderness. Vagina with large white clump-like discharge adherent to vaginal walls. Vagina is smooth without nodularity or masses. Cervix surgically absent.  Bimanual exam reveal no masses, nodularity or fullness. Recto-vaginal exam  confirms these findings. Dr. Medina present for pelvic exam.       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 She completed treatment with surgery and three cycles of chemotherapy on 10/16/17. She is here today for an acute visit.    A total of 30 minutes was spent with the patient, over 50% minutes of which were spent in counseling the patient and/or treatment planning.      Plan:     1.)         ASHISH on exam. Vaginal yeast present on exam. Will treat with PO diflucan with repeat in 72 hours of tablet. Will Rx Nystatin ointment for vulvar itching and Nystatin powder for periods of yeast under breast and pannus. Three years out from treatment, may continue surveillance every six months x3 years (through 10/2022) then annually thereafter with  and pelvic/rectal exam. Continue q6wk port flushes. Reviewed signs and symptoms for when she should contact the clinic or seek additional care. Patient to contact the clinic with any questions or concerns in the interim. Keep follow up appt in October      2.) Genetic testing: Hafsa is negative for mutations in the AIP, ALK, APC, COSTA, BAP1, BARD1, BLM, BRCA1, BRCA2, BRIP1, BMPR1A, CDH1, CDK4, CDKN1B, CDKN2A, CHEK2, DICER1, EPCAM, FANCC, FH, FLCN, GALNT12, GREM1, HOXB13, MAX, MEN1, MET, MITF, MLH1, MRE11A, MSH2, MSH6, MUTYH, NBN, NF1, NF2, PALB2, PHOX2B, PMS2, POLD1, POLE, POT1, NYFIU1A, PTCH1, PTEN, RAD50, RAD51C, RAD51D, RB1, RET, SDHA, SDHAF2, SDHB, SDHC, SDHD, SMAD4, SMARCA4, SMARCB1, SMARCE1, STK11, SUFU, VKAI768, TP53, TSC1, TSC2, VHL, and XRCC2 genes. No mutations were found in any of the 67 genes analyzed. High risk breast screening. Sees [facility]. On low dose tamoxifen since 10/10/2019 for prevention of breast cancer.     3.) Labs and/or tests ordered include:  None     4.) Health maintenance issues addressed today include annual health maintenance and non-gynecologic issues with PCP.      5.)        Lung nodules: stable for two years on CT considered statistically benign and no further imaging needed unless new symptoms.     6.)        Urinary incontinence: stress and urge. Undergoing pelvic floor PT and reports improvements. Angiomyolipoma of kidney. Patient has upcoming Urology appt with Dr. Mercado.     8.)        Rx: Diflucan (rx sent 6/9/2021), Nystatin powder and ointment        MARLON Bradshaw, [facility]-BC  Women's Health Nurse Practitioner  Division of Gynecologic Oncology  [facility]          CC  Patient Care Team:  Morelia Ayon NP as PCP - General  Karla Oswald RN as Continuity Care Coordinator (Gynecologic Oncology)  Nabeel Dick MD as MD (Urology)  Javier Gregorio MD as MD (Urology)  Lola Vasquez, PhD LP as Psychologist (Psychology)  Iris [facility] APRN CNP as Assigned OBGYN Provider  Jarod Shaffer MD as Assigned Cancer Care Provider  Morelia Ayon NP as Assigned PCP  Leland Mercado MD as MD (Urology)  Dayanna Clayton, RN as Registered Nurse (Oncology)

## 2021-06-11 NOTE — PROGRESS NOTES
[facility]    Facility:   [facility] [[id number]]     Code Status: FULL CODE      CHIEF COMPLAINT/REASON FOR VISIT:  Chief Complaint   Patient presents with     Problem Visit     pelvic mass removal       HISTORY:      HPI:   Hafsa is a 62 y.o. female who who had a year of increasing fatigue and mental health struggles, followed by a month of increasing bloating in her abdomen.  It was not exactly painful but the distention continued.  She took digestive enzymes.  However first day of July she had severe pain in her lower abdomen.  She went to the emergency room and imaging showed a large pelvic mass.  She was discharged home with instructions to follow-up with gynecology as an outpatient.  She had poor appetite, 2 episodes of bladder incontinence and worsening pain and return to the emergency department.  She felt rectal pressure, constipation, some anal pain.     She was admitted to the [facility].  There is a cystic mass by CT suggestion of complex cystic mass arising from the right ovary and adnexa, applying a mass-effect on the uterus.  (Per history and physical she had a previous right oophorectomy) no worrisome focal lesion of the kidneys adrenal glands spleen or liver.  There is some left upper quadrant tissue attenuation particularly left upper quadrant, omental infiltration cannot be excluded.  Pelvic ultrasound no endometrial stripe, right ovary not seen, left ovary unremarkable, large complex pelvic mass difficult to measure due to its size, origin of the mass could not be identified on this exam, no free pelvic fluid present, blood flow present to the left ovary   She had surgery 7/7/17 with Dr Serena Cole, a hysterectomy, left salpingo-oophorectomy, with right and left pelvic nodes, para-aortic node and omental biopsies.  Not clear if the total omentum was removed.  There is a large amount of dark blood in her pelvis, thought to be  "preoperative rupture of the cyst.  Pathology report was \"borderline tumor IC\".  Her CRP was 110.  Her  was 1187.  The tumor mass removed was 25 x 20 x 16.5 cm. She required Dilaudid for pain.      She also is being treated for a dog bite( her own dog), on her left thigh which required incision and drainage on 7/3/17. The wound is packed     Past history diabetes type 2, significant anxiety, hyperlipidemia, right oophorectomy in 2008, DVT in June 2017 on Xarelto.       Past Medical History:   Diagnosis Date     Abscess of left lower extremity      ADD (attention deficit disorder) without hyperactivity      Anxiety      Chronic rhinitis      Depressive disorder      Diabetes mellitus, type 2      DVT (deep venous thrombosis)      Lumbago      Normocytic anemia      Panic disorder with agoraphobia      Pelvic mass      Pure hypercholesterolemia      Tachycardia           Review of Systems   Had a normal bowel movement, but rectally still very painful to pass bowel movements  She threw up several times between 10 AM and 1 PM today but it seems to be over, she has had a couple 3 episodes of watery diarrhea after having had one soft stool.  Pain control is improved    .  Vitals:    07/18/17 0900   BP: 117/78   Pulse: 85   Resp: 20   Temp: 98.7  F (37.1  C)   SpO2: 96%       Physical Exam   Appears  A bit less anxious  Cardiovascular: Regular rhythm and normal heart sounds.    No murmur heard.  Pulmonary/Chest: Breath sounds normal. She has no wheezes. She has no rales.   Abdominal: Soft. She exhibits distension. There is tenderness.   Right-sided and LUQ bowel sounds present,  somewhat distended overall,  Less tender to gentle palpation.  Surgical incision dry, slight area of maceration at the very distal end which is under some abdominal pannus, and has some sero-sanguinous discharge. No redness of the incision area under the pannus    Skin: No rash noted.   Left thigh wound examined, Nu Gauze removed, healthy " viable tissue, some bloody discharge saturating the Mepilex.  Significant lymphedema, no skin openings or lesions   Psychiatric: Her behavior is normal. Thought content normal.   Anxious, needing a lot of reassurance, talked again  at length about her symptoms            LABS:   Glucoses in normal range, hasn't eaten as much    ASSESSMENT:        ICD-10-CM    1. Pelvic mass R19.00    2. Post-op pain G89.18    3. Anxiety F41.9    4. DVT (deep venous thrombosis) I82.409    5. Abscess of left lower extremity L02.416    6. Depressive disorder F32.9    7. Diabetes mellitus, type 2 E11.9        PLAN:    No change pain medication        Electronically signed by: Elena Santos MD

## 2021-06-12 NOTE — PROGRESS NOTES
[facility]      Facility:    ANSWER ROOMING ACTIVITY QUESTION    Code Status: FULL CODE   [facility] 7/2 to 7/11/17      Chief Complaint/Reason for Visit:  No chief complaint on file.      HPI:   Hafsa is a 62 y.o. female who who had a year of increasing fatigue and mental health struggles, followed by a month of increasing bloating in her abdomen.  It was not exactly painful but the distention continued.  She took digestive enzymes.  However first day of July she had severe pain in her lower abdomen.  She went to the emergency room and imaging showed a large pelvic mass.  She was discharged home with instructions to follow-up with gynecology as an outpatient.  She had poor appetite, 2 episodes of bladder incontinence and worsening pain and return to the emergency department.  She felt rectal pressure, constipation, some anal pain.    She was admitted to the [facility].  There is a cystic mass by CT suggestion of complex cystic mass arising from the right ovary and adnexa, applying a mass-effect on the uterus.  (Per history and physical she had a previous right oophorectomy) no worrisome focal lesion of the kidneys adrenal glands spleen or liver.  There is some left upper quadrant tissue attenuation particularly left upper quadrant, omental infiltration cannot be excluded.  Pelvic ultrasound no endometrial stripe, right ovary not seen, left ovary unremarkable, large complex pelvic mass difficult to measure due to its size, origin of the mass could not be identified on this exam, no free pelvic fluid present, blood flow present to the left ovary   She had surgery 7/7/17 with Dr Serena Cole, a hysterectomy, left salpingo-oophorectomy, with right and left pelvic nodes, para-aortic node and omental biopsies.  Not clear if the total omentum was removed.  There is a large amount of dark blood in her pelvis, thought to be preoperative rupture of the cyst.  Pathology report was  "\"borderline tumor IC\".  Her CRP was 110.  Her  was 1187.  The tumor mass removed was 25 x 20 x 16.5 cm. She required Dilaudid for pain.     She also is being treated for a dog bite( her own dog), on her left thigh which required incision and drainage on 7/3/17. The wound is packed    Past history diabetes type 2, significant anxiety, hyperlipidemia, right oophorectomy in 2008, DVT in June 2017 on Xarelto.    She was seen yesterday but wants to discuss her pain and anxiety    Past Medical History:  Past Medical History:   Diagnosis Date     Abscess of left lower extremity      ADD (attention deficit disorder) without hyperactivity      Anxiety      Chronic rhinitis      Depressive disorder      Diabetes mellitus, type 2      DVT (deep venous thrombosis)      Lumbago      Normocytic anemia      Panic disorder with agoraphobia      Pelvic mass      Pure hypercholesterolemia      Tachycardia            Surgical History:  Past Surgical History:   Procedure Laterality Date     ABSCESS DRAINAGE Left     left leg      BREAST CYST EXCISION  1985     ENDOMETRIAL ABLATION  2008     OTHER SURGICAL HISTORY  01/2002    facial surgery due to fall      SALPINGOOPHORECTOMY  2008                Review of Systems   She has less pain  no nausea or emesis.  Pain controlled better on Dilaudid 4 times daily during the day; today will decrease to 1 mg QID from 2mg QID  Had been referred by her PCP for lymphedema treatment at the [facility]; hasn't made the appointment yet    Blood pressure 113/78, pulse 85, temperature 99.2  F (37.3  C), resp. rate 20, SpO2 97 %.        Physical Exam     Constitutional:   Less anxious, articulate  Overweight   Cardiovascular: Regular rhythm and normal heart sounds.  No murmur heard.  Pulmonary/Chest: Breath sounds normal. She has no wheezes. She has no rales.   Abdominal: Soft. She exhibits distension. There is tenderness.    bowel sounds present in all quadrants   Surgical incision dry, no maceration " under abdominal pannus, and has minimal sero-sanguinous discharge.  Musculoskeletal: able to walk, get in/out of bed fairly effortlesly    Skin: No rash noted.   Left thigh wound covered, not examined, per RN reports, no erythema,  Significant lymphedema, no skin openings or lesions   Psychiatric: Her behavior is normal. Thought content normal.   Anxious, needing a lot of reassurance    Medication List:  Current Outpatient Prescriptions   Medication Sig     acetaminophen (TYLENOL) 325 MG tablet Take 650 mg by mouth every 6 (six) hours as needed.     albuterol (PROAIR HFA;PROVENTIL HFA;VENTOLIN HFA) 90 mcg/actuation inhaler Inhale 2 puffs 4 (four) times a day as needed.     chai-cell-lipas-malt-prt-lac-in (DIGESTIVE ENZYMES,MAL,LAC,INV,) 220 mg cap Take 1 capsule by mouth as needed.     atorvastatin (LIPITOR) 80 MG tablet Take 80 mg by mouth daily.     calcium-mag oxide-vitamin D3 (CORAL CALCIUM) 185- mg-mg-unit cap Take 2 tablets by mouth daily.     cholecalciferol, vitamin D3, 1,000 unit capsule Take 2,000 Units by mouth daily.     EPINEPHrine (EPIPEN) 0.3 mg/0.3 mL atIn Inject 0.3 mg into the shoulder, thigh, or buttocks once as needed.     escitalopram oxalate (LEXAPRO) 20 MG tablet Take 20 mg by mouth daily.     fluticasone (FLONASE) 50 mcg/actuation nasal spray 2 sprays into each nostril daily.     HYDROmorphone (DILAUDID) 4 MG tablet Take 2 mg by mouth 4 (four) times a day.      L.ACID/L.CASEI/B.BIF/B.DANIELLE/FOS (PROBIOTIC BLEND ORAL) Take 1 capsule by mouth daily.     lisdexamfetamine (VYVANSE) 50 MG capsule Take 50 mg by mouth daily.     lisinopril (PRINIVIL,ZESTRIL) 10 MG tablet Take 10 mg by mouth daily.     metFORMIN (GLUCOPHAGE-XR) 500 MG 24 hr tablet Take 1,000 mg by mouth every evening.     multivitamin with minerals (VITAMINS AND MINERALS) tablet Take 1 tablet by mouth daily.     multivitamin, stress formula (STRESS FORMULA) Tab Take 1 tablet by mouth daily.     polyethylene glycol (MIRALAX) 17 gram  packet Take 17 g by mouth daily.     rivaroxaban 20 mg Tab Take 20 mg by mouth daily with supper.     senna-docusate (PERICOLACE) 8.6-50 mg tablet Take 2 tablets by mouth 2 (two) times a day. Start with 1 tablet PO BID, If no bowel movement in 24 hours, increase to 2 tablets  Hold for loose stools.       Labs:  Admission labs urinalysis with protein in her urine, some ketones negative nitrite, no unusual white count.  Sodium 140, potassium 3.9, chloride 106, carbon dioxide 26, BUN 17, creatinine 0.88, glucose 112.  White count 8.7, hemoglobin 8.4, platelet count 296,000, 71% neutrophils        Assessment:    ICD-10-CM    1. Pelvic mass R19.00    2. Post-op pain G89.18    3. DVT (deep venous thrombosis) I82.409    4. Diabetes mellitus, type 2 E11.9    5. Dog bite of left thigh, subsequent encounter S71.152D     W54.0XXD    6. Abscess of left lower extremity L02.416    7. Anxiety F41.9    8. Depressive disorder F32.9    9. Lymphedema I89.0      PLAN:  Continue therapies, including left thigh abscess wound care  Weaning Dilaudid; should be able to discharge only on prn .        Electronically signed by: Elena Santos MD

## 2021-06-12 NOTE — PROGRESS NOTES
[facility]      Facility:    [facility] [[id number]]    Code Status: FULL CODE   [facility] 7/2 to 7/11/17      Chief Complaint/Reason for Visit:  Chief Complaint   Patient presents with     H & P     pelvic mass/ovarian tumor       HPI:   Hafsa is a 62 y.o. female who who had a year of increasing fatigue and mental health struggles, followed by a month of increasing bloating in her abdomen.  It was not exactly painful but the distention continued.  She took digestive enzymes.  However first day of July she had severe pain in her lower abdomen.  She went to the emergency room and imaging showed a large pelvic mass.  She was discharged home with instructions to follow-up with gynecology as an outpatient.  She had poor appetite, 2 episodes of bladder incontinence and worsening pain and return to the emergency department.  She felt rectal pressure, constipation, some anal pain.    She was admitted to the [facility].  There is a cystic mass by CT suggestion of complex cystic mass arising from the right ovary and adnexa, applying a mass-effect on the uterus.  (Per history and physical she had a previous right oophorectomy) no worrisome focal lesion of the kidneys adrenal glands spleen or liver.  There is some left upper quadrant tissue attenuation particularly left upper quadrant, omental infiltration cannot be excluded.  Pelvic ultrasound no endometrial stripe, right ovary not seen, left ovary unremarkable, large complex pelvic mass difficult to measure due to its size, origin of the mass could not be identified on this exam, no free pelvic fluid present, blood flow present to the left ovary   She had surgery 7/7/17 with Dr Serena Cole, a hysterectomy, left salpingo-oophorectomy, with right and left pelvic nodes, para-aortic node and omental biopsies.  Not clear if the total omentum was removed.  There is a large amount of dark blood in her pelvis, thought to be  "preoperative rupture of the cyst.  Pathology report was \"borderline tumor IC\".  Her CRP was 110.  Her  was 1187.  The tumor mass removed was 25 x 20 x 16.5 cm. She required Dilaudid for pain.     She also is being treated for a dog bite( her own dog), on her left thigh which required incision and drainage on 7/3/17. The wound is packed    Past history diabetes type 2, significant anxiety, hyperlipidemia, right oophorectomy in 2008, DVT in June 2017 on Xarelto.    Past Medical History:  Past Medical History:   Diagnosis Date     Abscess of left lower extremity      ADD (attention deficit disorder) without hyperactivity      Anxiety      Chronic rhinitis      Depressive disorder      Diabetes mellitus, type 2      DVT (deep venous thrombosis)      Lumbago      Normocytic anemia      Panic disorder with agoraphobia      Pelvic mass      Pure hypercholesterolemia      Tachycardia            Surgical History:  Past Surgical History:   Procedure Laterality Date     ABSCESS DRAINAGE Left     left leg      BREAST CYST EXCISION  1985     ENDOMETRIAL ABLATION  2008     OTHER SURGICAL HISTORY  01/2002    facial surgery due to fall      SALPINGOOPHORECTOMY  2008       Family History:   Family History   Problem Relation Age of Onset     Coronary artery disease Mother      Hypertension Mother      Breast cancer Mother      Colon cancer Mother      Bone cancer Mother      Other Mother      psychotic disease      Coronary artery disease Father      Diabetes Father      Hypertension Father      Other Father      cerebrovascular disease, psychotic disease      Pancreatic cancer Father      Other Sister      psychotic disease      Prostate cancer Brother      Schizophrenia Paternal Grandmother      Emphysema Paternal Grandfather      Depression Brother      Anxiety disorder Brother      Other Brother      psychotic disease        Social History:    Social History     Social History     Marital status: Single     Spouse name: N/A "     Number of children: N/A     Years of education: N/A     Social History Main Topics     Smoking status: Never Smoker     Smokeless tobacco: Never Used     Alcohol use Yes      Comment: rarely      Drug use: Not on file     Sexual activity: Not on file            Review of Systems   She has significant pain in her pelvis, she gets very anxious with pain, currently using Dilaudid as needed exactly on the three-hour interval.  Still with pelvic achiness, rectal pain and pressure with bowel movements, some nausea and vomiting.  Just had large bowel movement on 712, had not had any since surgery.  Left thigh wound from the dog bite is not too painful, she has very significant leg edema, have been told by her primary care provider to be seen at the lymphedema clinic at the [facility], that appointment has not been made.  She has very poor appetite, low intake, gets nauseated easily postoperatively.  At the use she tried oxycodone, but she did not feel it was so good at managing her pain, switched to Dilaudid which gave her not only better pain control but made her more alert anterior active the last with her mental function.  The remainder of the comprehensive review of systems is negative      Vitals:    07/13/17 0919   BP: 111/73   Pulse: 71   Resp: 20   Temp: 97.6  F (36.4  C)   SpO2: 97%       Physical Exam   Constitutional:   Somewhat anxious, articulate  Overweight   HENT:   Right Ear: External ear normal.   Left Ear: External ear normal.   Oral membranes somewhat dry   Eyes: Conjunctivae and EOM are normal. No scleral icterus.   Cardiovascular: Regular rhythm and normal heart sounds.    No murmur heard.  Pulmonary/Chest: Breath sounds normal. She has no wheezes. She has no rales.   Abdominal: Soft. She exhibits distension. There is tenderness.   Right-sided bowel sounds present, left side relatively quiet, somewhat distended overall, tender to gentle palpation.  Surgical incision dry, slight area of maceration  at the very distal end which is under some abdominal pannus, and has some sero-sanguinous discharge.   Lymphadenopathy:     She has no cervical adenopathy.   Skin: No rash noted.   Left thigh wound examined, Nu Gauze removed, healthy viable tissue, some bloody discharge saturating the Mepilex.  Significant lymphedema, no skin openings or lesions   Psychiatric: Her behavior is normal. Thought content normal.   Anxious, needing a lot of reassurance, talked at length about her symptoms       Medication List:  Current Outpatient Prescriptions   Medication Sig     acetaminophen (TYLENOL) 325 MG tablet Take 650 mg by mouth every 6 (six) hours as needed.     albuterol (PROAIR HFA;PROVENTIL HFA;VENTOLIN HFA) 90 mcg/actuation inhaler Inhale 2 puffs 4 (four) times a day as needed.     chai-cell-lipas-malt-prt-lac-in (DIGESTIVE ENZYMES,MAL,LAC,INV,) 220 mg cap Take 1 capsule by mouth as needed.     atorvastatin (LIPITOR) 80 MG tablet Take 80 mg by mouth daily.     calcium-mag oxide-vitamin D3 (CORAL CALCIUM) 185- mg-mg-unit cap Take 2 tablets by mouth daily.     cholecalciferol, vitamin D3, 1,000 unit capsule Take 2,000 Units by mouth daily.     EPINEPHrine (EPIPEN) 0.3 mg/0.3 mL atIn Inject 0.3 mg into the shoulder, thigh, or buttocks once as needed.     escitalopram oxalate (LEXAPRO) 20 MG tablet Take 20 mg by mouth daily.     fluticasone (FLONASE) 50 mcg/actuation nasal spray 2 sprays into each nostril daily.     HYDROmorphone (DILAUDID) 4 MG tablet Take 4-6 mg by mouth every 3 (three) hours as needed.     L.ACID/L.CASEI/B.BIF/B.DANIELLE/FOS (PROBIOTIC BLEND ORAL) Take 1 capsule by mouth daily.     lisdexamfetamine (VYVANSE) 50 MG capsule Take 50 mg by mouth daily.     lisinopril (PRINIVIL,ZESTRIL) 10 MG tablet Take 10 mg by mouth daily.     metFORMIN (GLUCOPHAGE-XR) 500 MG 24 hr tablet Take 1,000 mg by mouth every evening.     multivitamin with minerals (VITAMINS AND MINERALS) tablet Take 1 tablet by mouth daily.      multivitamin, stress formula (STRESS FORMULA) Tab Take 1 tablet by mouth daily.     polyethylene glycol (MIRALAX) 17 gram packet Take 17 g by mouth daily.     rivaroxaban 20 mg Tab Take 20 mg by mouth daily with supper.     senna-docusate (PERICOLACE) 8.6-50 mg tablet Take 2 tablets by mouth 2 (two) times a day. Start with 1 tablet PO BID, If no bowel movement in 24 hours, increase to 2 tablets  Hold for loose stools.       Labs:  Admission labs urinalysis with protein in her urine, some ketones negative nitrite, no unusual white count.  Sodium 140, potassium 3.9, chloride 106, carbon dioxide 26, BUN 17, creatinine 0.88, glucose 112.  White count 8.7, hemoglobin 8.4, platelet count 296,000, 71% neutrophils    Assessment:    ICD-10-CM    1. Pelvic mass R19.00    2. DVT (deep venous thrombosis) I82.409    3. Abscess of left lower extremity L02.416    4. Anxiety F41.9    5. Depressive disorder F32.9    6. Diabetes mellitus, type 2 E11.9    7. Panic disorder with agoraphobia F40.01    8. Normocytic anemia D64.9    9. Lumbago M54.5    10. ADD (attention deficit disorder) without hyperactivity F90.0          Plan:  Wound care for both her surgical incision where we need to put absorbent gauze under the pannus, there is an area of pink that I will check on him tomorrow under this pannus.  Left thigh wound packed with iodoform will be continued, looks healthy  Discussed with her that I think high-dose Dilaudid may be also slowing down her bowel function.  She is on as needed, I suggested that we decrease the dose some but schedule it even through the night per her request, to get better pain control.  After the get pain control, we can start to cut back on her frequently is given especially during the night.  This may be relating also to her nausea.  We will add gabapentin for pain control as well    Total time 65 minutes, greater than 50% reviewing the records from the [facility], taking extensive history from  this highly anxious patient, with many somatic complaints, and formulating a care plan      Electronically signed by: Elena Santos MD

## 2021-06-12 NOTE — PROGRESS NOTES
[facility]    Facility:   ANSWER ROOMING ACTIVITY QUESTION   Code Status: FULL CODE      CHIEF COMPLAINT/REASON FOR VISIT:  No chief complaint on file.      HISTORY:      HPI: Hafsa is a 62 y.o. female  Who was admitted from [facility] to [facility] post abdominal mass removal. She was seen at [facility]  on 7/3/17 with abdominal pain and distention. she was found to have a large pelvic mass . She has a history of RSO in 2007.  Pelvic mass was removed by Gyn Onc. Pt also was found to have a blood clot and she is taking xalretto. Pt abd incision is clean and healing well. Pain is well controlled. She reports of minimal  Nausea with dilaudid. Pt has jin  Lower extremity edema.  Pt made appoint today to see lymphedema clinic at  [facility].       Past Medical History:   Diagnosis Date     Abscess of left lower extremity      ADD (attention deficit disorder) without hyperactivity      Anxiety      Chronic rhinitis      Depressive disorder      Diabetes mellitus, type 2      DVT (deep venous thrombosis)      Lumbago      Normocytic anemia      Panic disorder with agoraphobia      Pelvic mass      Pure hypercholesterolemia      Tachycardia              Family History   Problem Relation Age of Onset     Coronary artery disease Mother      Hypertension Mother      Breast cancer Mother      Colon cancer Mother      Bone cancer Mother      Other Mother      psychotic disease      Coronary artery disease Father      Diabetes Father      Hypertension Father      Other Father      cerebrovascular disease, psychotic disease      Pancreatic cancer Father      Other Sister      psychotic disease      Prostate cancer Brother      Schizophrenia Paternal Grandmother      Emphysema Paternal Grandfather      Depression Brother      Anxiety disorder Brother      Other Brother      psychotic disease      Social History     Social History     Marital status: Single     Spouse name: N/A     Number of children: N/A      Years of education: N/A     Social History Main Topics     Smoking status: Never Smoker     Smokeless tobacco: Never Used     Alcohol use Yes      Comment: rarely      Drug use: Not on file     Sexual activity: Not on file     Other Topics Concern     Not on file     Social History Narrative     No narrative on file     Allergies   Allergen Reactions     Sulfa (Sulfonamide Antibiotics) Rash         Current Outpatient Prescriptions:      acetaminophen (TYLENOL) 325 MG tablet, Take 650 mg by mouth every 6 (six) hours as needed., Disp: , Rfl:      albuterol (PROAIR HFA;PROVENTIL HFA;VENTOLIN HFA) 90 mcg/actuation inhaler, Inhale 2 puffs 4 (four) times a day as needed., Disp: , Rfl:      chai-cell-lipas-malt-prt-lac-in (DIGESTIVE ENZYMES,MAL,LAC,INV,) 220 mg cap, Take 1 capsule by mouth as needed., Disp: , Rfl:      atorvastatin (LIPITOR) 80 MG tablet, Take 80 mg by mouth daily., Disp: , Rfl:      calcium-mag oxide-vitamin D3 (CORAL CALCIUM) 185- mg-mg-unit cap, Take 2 tablets by mouth daily., Disp: , Rfl:      cholecalciferol, vitamin D3, 1,000 unit capsule, Take 2,000 Units by mouth daily., Disp: , Rfl:      EPINEPHrine (EPIPEN) 0.3 mg/0.3 mL atIn, Inject 0.3 mg into the shoulder, thigh, or buttocks once as needed., Disp: , Rfl:      escitalopram oxalate (LEXAPRO) 20 MG tablet, Take 20 mg by mouth daily., Disp: , Rfl:      fluticasone (FLONASE) 50 mcg/actuation nasal spray, 2 sprays into each nostril daily., Disp: , Rfl:      HYDROmorphone (DILAUDID) 4 MG tablet, Take 4-6 mg by mouth every 3 (three) hours as needed., Disp: , Rfl:      L.ACID/L.CASEI/B.BIF/B.DANIELLE/FOS (PROBIOTIC BLEND ORAL), Take 1 capsule by mouth daily., Disp: , Rfl:      lisdexamfetamine (VYVANSE) 50 MG capsule, Take 50 mg by mouth daily., Disp: , Rfl:      lisinopril (PRINIVIL,ZESTRIL) 10 MG tablet, Take 10 mg by mouth daily., Disp: , Rfl:      metFORMIN (GLUCOPHAGE-XR) 500 MG 24 hr tablet, Take 1,000 mg by mouth every evening., Disp: , Rfl:       multivitamin with minerals (VITAMINS AND MINERALS) tablet, Take 1 tablet by mouth daily., Disp: , Rfl:      multivitamin, stress formula (STRESS FORMULA) Tab, Take 1 tablet by mouth daily., Disp: , Rfl:      polyethylene glycol (MIRALAX) 17 gram packet, Take 17 g by mouth daily., Disp: , Rfl:      rivaroxaban 20 mg Tab, Take 20 mg by mouth daily with supper., Disp: , Rfl:      senna-docusate (PERICOLACE) 8.6-50 mg tablet, Take 2 tablets by mouth 2 (two) times a day. Start with 1 tablet PO BID, If no bowel movement in 24 hours, increase to 2 tablets  Hold for loose stools., Disp: , Rfl:       Review of Systems   Constitutional: Positive for fatigue.   HENT: Negative.    Eyes: Negative.    Respiratory: Negative.    Cardiovascular:        Edema - jin extremities   Gastrointestinal: Positive for abdominal pain.   Endocrine: Negative.    Genitourinary: Negative.    Musculoskeletal: Negative.    Skin: Negative.    Allergic/Immunologic: Negative.    Neurological: Negative.    Hematological: Negative.    Psychiatric/Behavioral: Negative.        /64  Pulse 88  Temp 97.7  F (36.5  C)  Resp 18  SpO2 97%    Physical Exam   Constitutional: She appears well-developed.   HENT:   Head: Normocephalic.   Eyes: Pupils are equal, round, and reactive to light.   Abdominal: Soft. Bowel sounds are normal. There is tenderness.   Incision healing well   Skin: Skin is warm.   Psychiatric: She has a normal mood and affect.   Vitals reviewed.        LABS:   None  1. Status post abdominal surgery, follow-up exam     2. DVT (deep venous thrombosis)     3. Post-op pain     4. Stress     5. Diabetes mellitus     6. Hypertension             PLAN:    Follow up with lymphedema clinic. Continue  xalretto and continue taking dilaudid and alphabet for pain control    Edith PLATT , am scribing for and in the presence of, Dr. Elena Santos.      I, Dr. Elena Santos, personally performed the services described in this  documentation, as scribed by Edith Briggs and it is both accurate and complete.        Electronically Signed by: Elena Santos MD

## 2021-06-12 NOTE — PROGRESS NOTES
[facility]      Facility:    [facility] [[id number]]    Code Status: FULL CODE   [facility] 7/2 to 7/11/17      Chief Complaint/Reason for Visit:  Chief Complaint   Patient presents with     Problem Visit     ovarian mass s/p resection       HPI:   Hafsa is a 62 y.o. female who who had a year of increasing fatigue and mental health struggles, followed by a month of increasing bloating in her abdomen.  It was not exactly painful but the distention continued.  She took digestive enzymes.  However first day of July she had severe pain in her lower abdomen.  She went to the emergency room and imaging showed a large pelvic mass.  She was discharged home with instructions to follow-up with gynecology as an outpatient.  She had poor appetite, 2 episodes of bladder incontinence and worsening pain and return to the emergency department.  She felt rectal pressure, constipation, some anal pain.    She was admitted to the [facility].  There is a cystic mass by CT suggestion of complex cystic mass arising from the right ovary and adnexa, applying a mass-effect on the uterus.  (Per history and physical she had a previous right oophorectomy) no worrisome focal lesion of the kidneys adrenal glands spleen or liver.  There is some left upper quadrant tissue attenuation particularly left upper quadrant, omental infiltration cannot be excluded.  Pelvic ultrasound no endometrial stripe, right ovary not seen, left ovary unremarkable, large complex pelvic mass difficult to measure due to its size, origin of the mass could not be identified on this exam, no free pelvic fluid present, blood flow present to the left ovary   She had surgery 7/7/17 with Dr Serena Cole, a hysterectomy, left salpingo-oophorectomy, with right and left pelvic nodes, para-aortic node and omental biopsies.  Not clear if the total omentum was removed.  There is a large amount of dark blood in her pelvis,  "thought to be preoperative rupture of the cyst.  Pathology report was \"borderline tumor IC\".  Her CRP was 110.  Her  was 1187.  The tumor mass removed was 25 x 20 x 16.5 cm. She required Dilaudid for pain.     She also is being treated for a dog bite( her own dog), on her left thigh which required incision and drainage on 7/3/17. The wound is packed    Past history diabetes type 2, significant anxiety, hyperlipidemia, right oophorectomy in 2008, DVT in June 2017 on Xarelto.    She was seen yesterday but wants to discuss her pain and anxiety    Past Medical History:  Past Medical History:   Diagnosis Date     Abscess of left lower extremity      ADD (attention deficit disorder) without hyperactivity      Anxiety      Chronic rhinitis      Depressive disorder      Diabetes mellitus, type 2      DVT (deep venous thrombosis)      Lumbago      Normocytic anemia      Panic disorder with agoraphobia      Pelvic mass      Pure hypercholesterolemia      Tachycardia            Surgical History:  Past Surgical History:   Procedure Laterality Date     ABSCESS DRAINAGE Left     left leg      BREAST CYST EXCISION  1985     ENDOMETRIAL ABLATION  2008     OTHER SURGICAL HISTORY  01/2002    facial surgery due to fall      SALPINGOOPHORECTOMY  2008                Review of Systems   She has significant pain in her pelvis, she gets very anxious with pain, pain in rectum with BM  Intake is better, no nausea or emesis.  Pain controlled better on Dilaudid every 4 hours.  We will stop the nighttime dosing now and only as needed during the night shift, 4 times daily during the day    Vitals:    07/14/17 1221   BP: 132/86   Pulse: 92   Resp: 18   Temp: 97.4  F (36.3  C)   SpO2: 95%       Physical Exam     Constitutional:   Somewhat anxious, articulate  Overweight   Cardiovascular: Regular rhythm and normal heart sounds.  No murmur heard.  Pulmonary/Chest: Breath sounds normal. She has no wheezes. She has no rales.   Abdominal: Soft. " She exhibits distension. There is tenderness.    bowel sounds present in all quadrants   Surgical incision dry, no maceration under abdominal pannus, and has some sero-sanguinous discharge.    Skin: No rash noted.   Left thigh wound covered, not examined,   Significant lymphedema, no skin openings or lesions   Psychiatric: Her behavior is normal. Thought content normal.   Anxious, needing a lot of reassurance, talked at length about her symptoms     Medication List:  Current Outpatient Prescriptions   Medication Sig     acetaminophen (TYLENOL) 325 MG tablet Take 650 mg by mouth every 6 (six) hours as needed.     albuterol (PROAIR HFA;PROVENTIL HFA;VENTOLIN HFA) 90 mcg/actuation inhaler Inhale 2 puffs 4 (four) times a day as needed.     chai-cell-lipas-malt-prt-lac-in (DIGESTIVE ENZYMES,MAL,LAC,INV,) 220 mg cap Take 1 capsule by mouth as needed.     atorvastatin (LIPITOR) 80 MG tablet Take 80 mg by mouth daily.     calcium-mag oxide-vitamin D3 (CORAL CALCIUM) 185- mg-mg-unit cap Take 2 tablets by mouth daily.     cholecalciferol, vitamin D3, 1,000 unit capsule Take 2,000 Units by mouth daily.     EPINEPHrine (EPIPEN) 0.3 mg/0.3 mL atIn Inject 0.3 mg into the shoulder, thigh, or buttocks once as needed.     escitalopram oxalate (LEXAPRO) 20 MG tablet Take 20 mg by mouth daily.     fluticasone (FLONASE) 50 mcg/actuation nasal spray 2 sprays into each nostril daily.     HYDROmorphone (DILAUDID) 4 MG tablet Take 2 mg by mouth 4 (four) times a day.      L.ACID/L.CASEI/B.BIF/B.DANIELLE/FOS (PROBIOTIC BLEND ORAL) Take 1 capsule by mouth daily.     lisdexamfetamine (VYVANSE) 50 MG capsule Take 50 mg by mouth daily.     lisinopril (PRINIVIL,ZESTRIL) 10 MG tablet Take 10 mg by mouth daily.     metFORMIN (GLUCOPHAGE-XR) 500 MG 24 hr tablet Take 1,000 mg by mouth every evening.     multivitamin with minerals (VITAMINS AND MINERALS) tablet Take 1 tablet by mouth daily.     multivitamin, stress formula (STRESS FORMULA) Tab Take 1  tablet by mouth daily.     polyethylene glycol (MIRALAX) 17 gram packet Take 17 g by mouth daily.     rivaroxaban 20 mg Tab Take 20 mg by mouth daily with supper.     senna-docusate (PERICOLACE) 8.6-50 mg tablet Take 2 tablets by mouth 2 (two) times a day. Start with 1 tablet PO BID, If no bowel movement in 24 hours, increase to 2 tablets  Hold for loose stools.       Labs:  Admission labs urinalysis with protein in her urine, some ketones negative nitrite, no unusual white count.  Sodium 140, potassium 3.9, chloride 106, carbon dioxide 26, BUN 17, creatinine 0.88, glucose 112.  White count 8.7, hemoglobin 8.4, platelet count 296,000, 71% neutrophils    Assessment:    ICD-10-CM    1. Pelvic mass R19.00  darting to be more mobile .  Continue therapies    2. Post-op pain G89.18  just Dilaudid as above    3. DVT (deep venous thrombosis) I82.409    4. Diabetes mellitus, type 2 E11.9    5. Abscess of left lower extremity L02.416    6. Anxiety F41.9    7. Depressive disorder F32.9              Electronically signed by: Elena Santos MD

## 2021-06-12 NOTE — PROGRESS NOTES
[facility]    Name:   Hafsa Corona  : 1955  Facility:   [facility] [[id number]]   Room: 218  Code Status: FULL CODE -   Fac type:   SNF (Skilled Nursing Facility, TCU) -     CHIEF COMPLAINT / REASON FOR VISIT:  Chief Complaint   Patient presents with     Discharge Summary   Patient was last seen by me on 17.    HPI: Hafsa is a 62 y.o. female who endured a year of increasing fatigue and mental health struggles, followed by a month of increasing abdominal bloating for which she took digestive enzymes.  However, on 17, he developed severe pain in the lower abdomen, went to the ER, and imaging showed a large pelvic mass.  She was discharged home with instructions to follow-up with gynecology as an outpatient.  Her appetite was poor, she had 2 episodes of bladder incontinence and, with worsening pain, she returned to the ER also complaining of rectal pressure/pain and constipation.    She was admitted to the [facility].  Abdominal CT revealed a complex cystic mass arising from the right ovary and adnexa, applying a mass-effect on the uterus.  Per her history, she had a previous right oophorectomy () but no worrisome focal lesions of the kidneys, adrenal glands, there was some left upper quadrant tissue attenuation, but omental infiltration could not be excluded.  A pelvic ultrasound revealed a large complex pelvic mass (initially difficult to measure its size), with the origin of the mass could not be identified at the time.  She does have a follow-up appointment with the surgeon on 17.    She underwent surgery on 17 with Dr. Serena Cole.  She performed a hysterectomy, left salpingo-oophorectomy, with right and left pelvic nodes, periaortic node, and omental biopsies.  The entire omentum was removed.  There was a large amount of blood in the pelvis, thought to be a preoperative rupture of the cyst.  Pathology report was  borderline tumor IC.  Her CRP was 110,  1187.  The removed tumor mass was 25 x 20 x 16.5 cm.    Current pain management is with hydromorphone.  Dr. Santos did decrease the dose as her pain is improving.  She now receives 1 mg scheduled at 08 100, 1200, 1600, and 2000 and also receives 1 mg every 4 hours as needed during the night shift.  Note that Dr. Santos also placed her on 100 mg of gabapentin twice daily.    She is also being treated for a dog bite (her own dog) with an abscess of the left thigh which did require incision and drainage on 07/03/17.  The wound is currently packed, and there is still a bit of bleeding.    The patient is expected to discharge later this week.  A home visit was done in preparation.  She has a follow-up surgeon appointment on 07/31/17 (we will talk about pathology results, as they apparently felt borderline cancer), and she has an appointment at the lymphedema clinic of the [facility] 08/01/17.    She has a past history of diabetes mellitus type 2 (well-controlled, and she is on a diabetic diet), significant anxiety (escitalopram), hyperlipidemia (atorvastatin), and left lower extremity DVT (06/2017), the last for which she is on Xarelto.    Due to her surgery, she still has occasional groin pain.  She has experienced some diarrhea after lunch the last few days.  Bowel movements can still be somewhat painful following surgery.  Despite narcotic analgesics, she has not had a problem with constipation.  No headaches or chest pains, coughing or congestion, dizziness or dyspnea, nausea or vomiting, dysuria, difficulty chewing or swallowing, or any difficulty with sleep.      Past Medical History:   Diagnosis Date     Abscess of left lower extremity      ADD (attention deficit disorder) without hyperactivity      Anxiety      Chronic rhinitis      Depressive disorder      Diabetes mellitus, type 2      DVT (deep venous thrombosis)      Lumbago      Normocytic anemia       Panic disorder with agoraphobia      Pelvic mass      Pure hypercholesterolemia      Tachycardia               Family History   Problem Relation Age of Onset     Coronary artery disease Mother      Hypertension Mother      Breast cancer Mother      Colon cancer Mother      Bone cancer Mother      Other Mother      psychotic disease      Coronary artery disease Father      Diabetes Father      Hypertension Father      Other Father      cerebrovascular disease, psychotic disease      Pancreatic cancer Father      Other Sister      psychotic disease      Prostate cancer Brother      Schizophrenia Paternal Grandmother      Emphysema Paternal Grandfather      Depression Brother      Anxiety disorder Brother      Other Brother      psychotic disease      Social History     Social History     Marital status: Single     Spouse name: N/A     Number of children: N/A     Years of education: N/A     Social History Main Topics     Smoking status: Never Smoker     Smokeless tobacco: Never Used     Alcohol use Yes      Comment: rarely      Drug use: Not on file     Sexual activity: Not on file     Other Topics Concern     Not on file     Social History Narrative     MEDICATIONS: Reviewed from the MAR, physician orders, and earlier progress notes.  Updated by me most recently on 07/25/17.  Current Outpatient Prescriptions   Medication Sig     acetaminophen (TYLENOL) 325 MG tablet Take 650 mg by mouth every 6 (six) hours as needed.     albuterol (PROAIR HFA;PROVENTIL HFA;VENTOLIN HFA) 90 mcg/actuation inhaler Inhale 2 puffs 4 (four) times a day as needed.     chai-cell-lipas-malt-prt-lac-in (DIGESTIVE ENZYMES,MAL,LAC,INV,) 220 mg cap Take 1 capsule by mouth as needed.     atorvastatin (LIPITOR) 80 MG tablet Take 80 mg by mouth daily.     cholecalciferol, vitamin D3, 1,000 unit capsule Take 2,000 Units by mouth daily.     EPINEPHrine (EPIPEN) 0.3 mg/0.3 mL atIn Inject 0.3 mg into the shoulder, thigh, or buttocks once as needed.  "    escitalopram oxalate (LEXAPRO) 20 MG tablet Take 20 mg by mouth daily.     fluticasone (FLONASE) 50 mcg/actuation nasal spray 2 sprays into each nostril daily.     gabapentin (NEURONTIN) 100 MG capsule Take 100 mg by mouth 2 (two) times a day.     HYDROmorphone (DILAUDID) 2 MG tablet Take 1 mg by mouth 4 (four) times a day.     HYDROmorphone (DILAUDID) 2 MG tablet Take 1 mg by mouth every 4 (four) hours as needed for pain (During night shift.).     L.ACID/L.CASEI/B.BIF/B.DANIELLE/FOS (PROBIOTIC BLEND ORAL) Take 1 capsule by mouth daily.     lisinopril (PRINIVIL,ZESTRIL) 10 MG tablet Take 10 mg by mouth daily.     metFORMIN (GLUCOPHAGE-XR) 500 MG 24 hr tablet Take 1,000 mg by mouth every evening.     multivitamin with minerals (VITAMINS AND MINERALS) tablet Take 1 tablet by mouth daily.     multivitamin, stress formula (STRESS FORMULA) Tab Take 1 tablet by mouth daily.     ondansetron (ZOFRAN) 4 MG tablet Take 4 mg by mouth every 6 (six) hours as needed for nausea.     polyethylene glycol (MIRALAX) 17 gram packet Take 17 g by mouth daily.     rivaroxaban 20 mg Tab Take 20 mg by mouth daily with supper.     senna-docusate (PERICOLACE) 8.6-50 mg tablet Take 2 tablets by mouth 2 (two) times a day. Start with 1 tablet PO BID, If no bowel movement in 24 hours, increase to 2 tablets  Hold for loose stools.     ALLERGIES:   Allergies   Allergen Reactions     Sulfa (Sulfonamide Antibiotics) Rash     DIET: Diabetic, regular texture, thin liquids.    Vitals:    07/28/17 1127   BP: 112/70   Pulse: 84   Resp: 20   Temp: 98.8  F (37.1  C)   Weight: 204 lb (92.5 kg)   Height: 5' 3.5\" (1.613 m)     Body mass index is 35.57 kg/(m^2).    EXAMINATION:   General: Pleasant, obese middle-aged female, sitting on her bed, in no apparent distress.  Head: Normocephalic and atraumatic.   Eyes: PERRLA, sclerae clear.   ENT: Moist oral mucosa.  She has her own teeth.  Cardiovascular: Regular rate and rhythm.  2/6 systolic ejection murmur at the " left sternal border.  Respiratory: Lungs clear to auscultation bilaterally.   Abdomen: Soft and nontender.  Surgical incision from the umbilicus to the pubis.  Clean, dry, intact, and without any indication of infection.  Musculoskeletal/Extremities: Significant peripheral lymphedema.  Also, pitting edema with bilateral 2+ pretibially and pedal edema equal to 3+ on the left and 2+ on the right.  Integument: No rashes, clinically significant lesions, or skin breakdown.   Cognitive/Psychiatric: Alert and oriented ×3.  Affect is euthymic.    DIAGNOSTICS:   No results found for this or any previous visit.    Lab Results   Component Value Date    HGB 8.4 (L) 07/19/2017     CrCl cannot be calculated (No order found.).    ASSESSMENT/Plan:      ICD-10-CM    1. Diabetes mellitus, type 2 E11.9    2. History of pelvic mass Z87.898    3. Dog bite of left thigh, subsequent encounter S71.152D     W54.0XXD    4. History of DVT (deep vein thrombosis) Z86.718    5. Lymphedema I89.0    6. Anxiety F41.9    7. ADD (attention deficit disorder) without hyperactivity F90.0      DISCHARGE PLAN/FACE TO FACE:  I certify that this patient is under my care and that I had a face-to-face encounter that meets the physician face-to-face encounter requirements with this patient.     Date of Face-to-Face Encounter: July 28, 2017     I certify that, based on my findings, the following services are medically necessary home health services: Home health nurse, physical therapy, occupational therapy, .    My clinical findings support the need for the above skilled services because: (Please write a brief narrative summary that describes what the RN, PT, SLP, or other services will be doing in the home. A list of diagnoses in this section does not meet the CMS requirements): HHN: Patient will require dressing changes to her thigh wound as well as leg wrapping.  PT: Strengthening for balance.  OT: Help with dressing, decluttering, and  cognition.  SS: Issues regarding anxiety levels and hoarding.    This patient is homebound because: (Please write a brief narrative summmary describing the functional limitations as to why this patient is homebound and specifically what makes this patient homebound.)  Due to her leg wound and also the need for narcotic analgesics, she is unable to drive, and walking ability is minimal.    The patient is, or has been, under my care and I have initiated the establishment of the plan of care. This patient will be followed by a physician who will periodically review the plan of care.    Approximate time spent with this patient was greater than 30 minutes with greater than 50% spent in discussions regarding services required upon discharge.      Electronically signed by: Yovani Melchor CNP

## 2021-06-12 NOTE — PROGRESS NOTES
[facility]    Name:   Hafsa Corona  : 1955  Facility:   [facility] [[id number]]   Room: 218  Code Status: FULL CODE -   Fac type:   SNF (Skilled Nursing Facility, TCU) -     CHIEF COMPLAINT / REASON FOR VISIT:  Chief Complaint   Patient presents with     Problem Visit     TCU follow-up following hospitalization for pelvic mass removal.        HPI: Hafsa is a 62 y.o. female who endured a year of increasing fatigue and mental health struggles, followed by a month of increasing abdominal bloating for which she took digestive enzymes.  However, on 17, he developed severe pain in the lower abdomen, went to the ER, and imaging showed a large pelvic mass.  She was discharged home with instructions to follow-up with gynecology as an outpatient.  Her appetite was poor, she had 2 episodes of bladder incontinence and, with worsening pain, she returned to the ER also complaining of rectal pressure/pain and constipation.    She was admitted to the [facility].  Abdominal CT revealed a complex cystic mass arising from the right ovary and adnexa, applying a mass-effect on the uterus.  Per her history, she had a previous right oophorectomy () but no worrisome focal lesions of the kidneys, adrenal glands, there was some left upper quadrant tissue attenuation, but omental infiltration could not be excluded.  A pelvic ultrasound revealed a large complex pelvic mass (initially difficult to measure its size), with the origin of the mass could not be identified at the time.    She underwent surgery on 17 with Dr. Serena Cole.  She performed a hysterectomy, left salpingo-oophorectomy, with right and left pelvic nodes, periaortic node, and omental biopsies.  The entire omentum was removed.  There was a large amount of blood in the pelvis, thought to be a preoperative rupture of the cyst.  Pathology report was borderline tumor IC.  Her CRP was 110,   1187.  The removed tumor mass was 25 x 20 x 16.5 cm.    Current pain management is with hydromorphone.  Dr. Santos did decrease the dose as her pain is improving.  She now receives 1 mg scheduled at 08 100, 1200, 1600, and 2000 and also receives 1 mg every 4 hours as needed during the night shift.  Note that Dr. Santos also placed her on 100 mg of gabapentin twice daily.    She is also being treated for a dog bite (her own dog) with an abscess of the left thigh which did require incision and drainage on 07/03/17.  The wound is currently packed, and there is still a bit of bleeding.    The patient is expected to discharge later this week.  A home visit was done in preparation.  She has a follow-up surgeon appointment on 07/31/17 (we will talk about pathology results, as they apparently felt borderline cancer), and she has an appointment at the lymphedema clinic of the [facility] 08/01/17.    She has a past history of diabetes mellitus type 2 (well-controlled, and she is on a diabetic diet), significant anxiety (escitalopram), hyperlipidemia (atorvastatin), and left lower extremity DVT (06/2017), the last for which she is on Xarelto.    No headaches or chest pains, coughing or congestion, dizziness or dyspnea, nausea or vomiting, dysuria, difficulty chewing or swallowing, or any difficulty with sleep.  No constipation or diarrhea (normal BM yesterday), although bowel movements are still somewhat painful, with pain proceeding bowel movement.    Past Medical History:   Diagnosis Date     Abscess of left lower extremity      ADD (attention deficit disorder) without hyperactivity      Anxiety      Chronic rhinitis      Depressive disorder      Diabetes mellitus, type 2      DVT (deep venous thrombosis)      Lumbago      Normocytic anemia      Panic disorder with agoraphobia      Pelvic mass      Pure hypercholesterolemia      Tachycardia               Family History   Problem Relation Age of Onset      Coronary artery disease Mother      Hypertension Mother      Breast cancer Mother      Colon cancer Mother      Bone cancer Mother      Other Mother      psychotic disease      Coronary artery disease Father      Diabetes Father      Hypertension Father      Other Father      cerebrovascular disease, psychotic disease      Pancreatic cancer Father      Other Sister      psychotic disease      Prostate cancer Brother      Schizophrenia Paternal Grandmother      Emphysema Paternal Grandfather      Depression Brother      Anxiety disorder Brother      Other Brother      psychotic disease      Social History     Social History     Marital status: Single     Spouse name: N/A     Number of children: N/A     Years of education: N/A     Social History Main Topics     Smoking status: Never Smoker     Smokeless tobacco: Never Used     Alcohol use Yes      Comment: rarely      Drug use: Not on file     Sexual activity: Not on file     Other Topics Concern     Not on file     Social History Narrative     MEDICATIONS: Reviewed from the MAR, physician orders, and earlier progress notes.  Updated by me today (07/25/17) with recent changes that include the decrease in hydromorphone and addition of gabapentin reflected below.  Current Outpatient Prescriptions   Medication Sig     gabapentin (NEURONTIN) 100 MG capsule Take 100 mg by mouth 2 (two) times a day.     HYDROmorphone (DILAUDID) 2 MG tablet Take 1 mg by mouth 4 (four) times a day.     HYDROmorphone (DILAUDID) 2 MG tablet Take 1 mg by mouth every 4 (four) hours as needed for pain (During night shift.).     ondansetron (ZOFRAN) 4 MG tablet Take 4 mg by mouth every 6 (six) hours as needed for nausea.     acetaminophen (TYLENOL) 325 MG tablet Take 650 mg by mouth every 6 (six) hours as needed.     albuterol (PROAIR HFA;PROVENTIL HFA;VENTOLIN HFA) 90 mcg/actuation inhaler Inhale 2 puffs 4 (four) times a day as needed.     chai-cell-lipas-malt-prt-lac-in (DIGESTIVE  "ENZYMES,MAL,LAC,INV,) 220 mg cap Take 1 capsule by mouth as needed.     atorvastatin (LIPITOR) 80 MG tablet Take 80 mg by mouth daily.     cholecalciferol, vitamin D3, 1,000 unit capsule Take 2,000 Units by mouth daily.     EPINEPHrine (EPIPEN) 0.3 mg/0.3 mL atIn Inject 0.3 mg into the shoulder, thigh, or buttocks once as needed.     escitalopram oxalate (LEXAPRO) 20 MG tablet Take 20 mg by mouth daily.     fluticasone (FLONASE) 50 mcg/actuation nasal spray 2 sprays into each nostril daily.     L.ACID/L.CASEI/B.BIF/B.DANIELLE/FOS (PROBIOTIC BLEND ORAL) Take 1 capsule by mouth daily.     lisdexamfetamine (VYVANSE) 50 MG capsule Take 50 mg by mouth daily.     lisinopril (PRINIVIL,ZESTRIL) 10 MG tablet Take 10 mg by mouth daily.     metFORMIN (GLUCOPHAGE-XR) 500 MG 24 hr tablet Take 1,000 mg by mouth every evening.     multivitamin with minerals (VITAMINS AND MINERALS) tablet Take 1 tablet by mouth daily.     multivitamin, stress formula (STRESS FORMULA) Tab Take 1 tablet by mouth daily.     polyethylene glycol (MIRALAX) 17 gram packet Take 17 g by mouth daily.     rivaroxaban 20 mg Tab Take 20 mg by mouth daily with supper.     senna-docusate (PERICOLACE) 8.6-50 mg tablet Take 2 tablets by mouth 2 (two) times a day. Start with 1 tablet PO BID, If no bowel movement in 24 hours, increase to 2 tablets  Hold for loose stools.     ALLERGIES:   Allergies   Allergen Reactions     Sulfa (Sulfonamide Antibiotics) Rash     DIET: Diabetic, regular texture, thin liquids.    Vitals:    07/25/17 1109   BP: 112/75   Pulse: 76   Resp: 18   Temp: 99  F (37.2  C)   Weight: 207 lb 6.4 oz (94.1 kg)   Height: 5' 3.5\" (1.613 m)     Body mass index is 36.16 kg/(m^2).    EXAMINATION:   General: Pleasant, obese middle-aged female, sitting on her bed, in no apparent distress.  Head: Normocephalic and atraumatic.   Eyes: PERRLA, sclerae clear.   ENT: Moist oral mucosa.  She has her own teeth.  Cardiovascular: Regular rate and rhythm.  2/6 systolic " ejection murmur at the left sternal border.  Respiratory: Lungs clear to auscultation bilaterally.   Abdomen: Soft and nontender.  Surgical incision from the umbilicus to the pubis.  Clean, dry, intact, and without any indication of infection.  Musculoskeletal/Extremities: Significant peripheral lymphedema, nonpitting bilaterally.  Integument: No rashes, clinically significant lesions, or skin breakdown.   Cognitive/Psychiatric: Alert and oriented ×3.  Affect is euthymic.    DIAGNOSTICS:   No results found for this or any previous visit.    Lab Results   Component Value Date    HGB 8.4 (L) 07/19/2017     CrCl cannot be calculated (No order found.).    ASSESSMENT/Plan:      ICD-10-CM    1. Diabetes mellitus, type 2 E11.9    2. History of pelvic mass Z87.898    3. Dog bite of left thigh, subsequent encounter S71.152D     W54.0XXD    4. History of DVT (deep vein thrombosis) Z86.718    5. Lymphedema I89.0    6. Anxiety F41.9      CHANGES:    None.    CARE PLAN:    The care plan has been reviewed and all orders signed. Changes to care plan, if any, as noted. Otherwise, continue care plan of care.      Electronically signed by: Yovani Melchor CNP

## 2021-06-14 ENCOUNTER — THERAPY VISIT (OUTPATIENT)
Dept: PHYSICAL THERAPY | Facility: CLINIC | Age: 66
End: 2021-06-14
Payer: COMMERCIAL

## 2021-06-14 DIAGNOSIS — N39.46 MIXED INCONTINENCE: ICD-10-CM

## 2021-06-14 PROCEDURE — 97535 SELF CARE MNGMENT TRAINING: CPT | Mod: GP | Performed by: PHYSICAL THERAPIST

## 2021-06-14 PROCEDURE — 97530 THERAPEUTIC ACTIVITIES: CPT | Mod: GP | Performed by: PHYSICAL THERAPIST

## 2021-06-14 NOTE — PROGRESS NOTES
DISCHARGE  REPORT       SUBJECTIVE   Subjective: The pt reports things have been really busy. Pt has been doing her exercises every day, however, she is not hitting the 30 reps every day. She feels as though she sometimes does not fully relax after each rep. The pt reports having a  yeast infection. The pt is on 3 different types of yeast infection medications, topical and oral medication. She has noticed that when she gets up from sitting she has a strong urge to urinate.  Yesterday she did not make it to the toilet in time with an urge to urinate.  The pt reports 80% improvement since starting physical therapy.    Current pain level is 0/10  .     Previous pain level was  0/10 Initial Pain level: 0/10.   Changes in function:  Yes (See Goal flowsheet attached for changes in current functional level)  Adverse reaction to treatment or activity: None    OBJECTIVE  Changes noted in objective findings:  Yes,   Objective: voids during day: every 2-3 hours, feels as though she empties completely, 2 episodes of urge incontinence last 10 days.     ASSESSMENT/PLAN  Updated problem list and treatment plan: Diagnosis 1:  Pelvic floor dysfunction  Pain -  hot/cold therapy, US, electric stimulation, mechanical traction, manual therapy, STS, splint/taping/bracing/orthotics, self management, education, directional preference exercise and home program  Impaired muscle performance - biofeedback, electric stimulation, neuro re-education and home program  STG/LTGs have been met or progress has been made towards goals:  Yes (See Goal flow sheet completed today.)  Assessment of Progress: The patient's condition is improving.  Self Management Plans:  Patient has been instructed in a home treatment program.  Patient  has been instructed in self management of symptoms.  I have re-evaluated this patient and find that the nature, scope, duration and intensity of the therapy is appropriate for the medical condition of the patient.  Hafsa  continues to require the following intervention to meet STG and LTG's:  PT    Recommendations:  This patient would benefit from continued therapy.     Frequency:  2 X a month, once daily  Duration:  for 2 months    Patient did not follow up with physical therapy.    Please refer to the daily flowsheet for treatment today, total treatment time and time spent performing 1:1 timed codes.

## 2021-06-17 ENCOUNTER — TRANSFERRED RECORDS (OUTPATIENT)
Dept: HEALTH INFORMATION MANAGEMENT | Facility: CLINIC | Age: 66
End: 2021-06-17

## 2021-06-21 PROCEDURE — 250N000011 HC RX IP 250 OP 636: Performed by: OBSTETRICS & GYNECOLOGY

## 2021-06-21 PROCEDURE — 96523 IRRIG DRUG DELIVERY DEVICE: CPT

## 2021-06-21 RX ORDER — HEPARIN SODIUM (PORCINE) LOCK FLUSH IV SOLN 100 UNIT/ML 100 UNIT/ML
5 SOLUTION INTRAVENOUS ONCE
Status: COMPLETED | OUTPATIENT
Start: 2021-06-21 | End: 2021-06-21

## 2021-06-21 RX ADMIN — Medication 5 ML: at 17:06

## 2021-06-21 NOTE — NURSING NOTE
"Chief Complaint   Patient presents with     Port Flush     Port flushed by RN.     Port accessed with 20g 3/4\" gripper needle. Good blood return observed. Port flushed with NS and heparin. De-accessed. Pt tolerated well.    Zachery Medina RN  "

## 2021-06-25 ENCOUNTER — PRE VISIT (OUTPATIENT)
Dept: UROLOGY | Facility: CLINIC | Age: 66
End: 2021-06-25

## 2021-06-25 ENCOUNTER — OFFICE VISIT (OUTPATIENT)
Dept: UROLOGY | Facility: CLINIC | Age: 66
End: 2021-06-25
Attending: OBSTETRICS & GYNECOLOGY
Payer: COMMERCIAL

## 2021-06-25 VITALS
DIASTOLIC BLOOD PRESSURE: 81 MMHG | SYSTOLIC BLOOD PRESSURE: 131 MMHG | WEIGHT: 205 LBS | HEIGHT: 63 IN | HEART RATE: 87 BPM | BODY MASS INDEX: 36.32 KG/M2

## 2021-06-25 DIAGNOSIS — D17.71 ANGIOMYOLIPOMA OF KIDNEY: ICD-10-CM

## 2021-06-25 DIAGNOSIS — N39.46 MIXED STRESS AND URGE URINARY INCONTINENCE: ICD-10-CM

## 2021-06-25 LAB
ALBUMIN UR-MCNC: NEGATIVE MG/DL
APPEARANCE UR: ABNORMAL
BILIRUB UR QL STRIP: NEGATIVE
COLOR UR AUTO: YELLOW
GLUCOSE UR STRIP-MCNC: NEGATIVE MG/DL
HGB UR QL STRIP: NEGATIVE
KETONES UR STRIP-MCNC: NEGATIVE MG/DL
LEUKOCYTE ESTERASE UR QL STRIP: NEGATIVE
NITRATE UR QL: NEGATIVE
PH UR STRIP: 5 PH (ref 5–7)
RBC #/AREA URNS AUTO: 5 /HPF (ref 0–2)
SOURCE: ABNORMAL
SP GR UR STRIP: 1.02 (ref 1–1.03)
SQUAMOUS #/AREA URNS AUTO: 1 /HPF (ref 0–1)
UROBILINOGEN UR STRIP-MCNC: 0 MG/DL (ref 0–2)
WBC #/AREA URNS AUTO: 3 /HPF (ref 0–5)

## 2021-06-25 PROCEDURE — 81001 URINALYSIS AUTO W/SCOPE: CPT | Performed by: PATHOLOGY

## 2021-06-25 PROCEDURE — 99203 OFFICE O/P NEW LOW 30 MIN: CPT | Performed by: UROLOGY

## 2021-06-25 ASSESSMENT — PAIN SCALES - GENERAL: PAINLEVEL: NO PAIN (0)

## 2021-06-25 ASSESSMENT — MIFFLIN-ST. JEOR: SCORE: 1434

## 2021-06-25 ASSESSMENT — PATIENT HEALTH QUESTIONNAIRE - PHQ9: SUM OF ALL RESPONSES TO PHQ QUESTIONS 1-9: 2

## 2021-06-25 NOTE — LETTER
6/25/2021       RE: Hafsa Corona  [address]  [place] [address]     Dear Colleague,    Thank you for referring your patient, Hafsa Corona, to the [facility] [place] at [facility]. Please see a copy of my visit note below.    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  -renal ultrasound to check on contralateral kidney  -Plan for renal ultrasound in 2-3 years.    Stressful Incontinence improving with pelvic floor physical therapy.  She would like consult with [facility] to discuss other options.    Stage 3 renal failure.    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 At that time she was incidentally found to have a 3 cm mass in the superior pole of the right kidney, suspicious for RCC. She had no symptoms of hematuria, flank pain, or abdominal pass at that time. She then underwent a right radical nephrectomy at [facility] in 1/2018. 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 Incidentally there was a 0.4 cm oncocytoma identified with the specimen as well.    Since then, the patient reports that she has been feeling well without hematuria, dysuria, changes in urination, flank pain, or abdominal masses. She notes more frequent UTIs since her gynecologic surgeries in 2017 that are well managed with nitrofurantoin. Ultimately she presents today to establish care in order to follow her past history of right renal angiomyolipoma.     The patient notes recent stress incontinence for which she is currently doing pelvic floor PT with improvement. 1 pad per day.  She would like to discuss other treatment options and follow up plans.    ECOG Performance  Score: 0  0=Fully active, 1=Unable to do strenuous activity but capable of light work, 2=Ambulatory and capable of all selfcare, 3=Capable of limited selfcare, confined to bed >50% of waking hours, 4=Completely disabled.    This is a select list of notes I reviewed during this visit.  There may be additional notes I reviewed which are note listed:    Reviewed Dr Dick 2018 post op note.     I reviewed the following laboratory data and went over findings with patient:  Recent Labs   Lab Test 01/14/20  0327 01/03/20  1001 09/27/19  1338 07/09/18  1351 11/14/17  0949 11/14/17  0949   WBC 6.7 7.7 5.8  --   --  4.9   HGB 11.1* 11.8 11.5* 11.5*   < > 9.4*    317 262  --   --  229    < > = values in this interval not displayed.     Recent Labs   Lab Test 03/27/20  1510 01/14/20  0327 01/03/20  1001 09/27/19  1338   CR 1.14* 1.30* 1.24* 1.15*   GFRESTIMATED 50* 43* 45* 50*   GFRESTBLACK 58* 50* 52* 58*   GLC 80 147* 118* 98       Again, thank you for allowing me to participate in the care of your patient.      Sincerely,    Leland Mercado MD

## 2021-06-25 NOTE — NURSING NOTE
"Chief Complaint   Patient presents with     Follow Up     New kidney mass       Blood pressure 131/81, pulse 87, height 1.6 m (5' 3\"), weight 93 kg (205 lb), last menstrual period 01/01/2009, not currently breastfeeding. Body mass index is 36.31 kg/m .    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)     Pain in joint, multiple sites     ACP (advance care planning)     Renal cell carcinoma, right (H)     Solitary kidney, acquired     Encounter for follow-up surveillance of ovarian cancer     Personal history of DVT (deep vein thrombosis)     Abscess of left lower extremity     ADD (attention deficit disorder) without hyperactivity     Angiomyolipoma of kidney     Anxiety     Coronary atherosclerosis     Depressive disorder     Dyslipidemia     Dysthymic disorder     Generalized anxiety disorder     Heel pain     Lymphedema     Mild anemia     Normocytic anemia     Renal oncocytoma     Panic disorder with agoraphobia     At high risk for breast cancer     Chronic kidney disease, stage 3     Mixed incontinence       Allergies   Allergen Reactions     Paclitaxel Anaphylaxis and Difficulty breathing     Wasps [Hornets] Anaphylaxis     Yellow Hornet Venom [Hornet Venom] Anaphylaxis and Swelling     Yellow Jacket Venom [Venomil Honey Bee] Anaphylaxis and Swelling     Sulfa Drugs Hives and Rash       Current Outpatient Medications   Medication " Sig Dispense Refill     Acetaminophen (TYLENOL PO) Take 500 mg by mouth 2 tabs prn pain (monthly)       atorvastatin (LIPITOR) 80 MG tablet TAKE 1 TABLET(80 MG) BY MOUTH DAILY 90 tablet 3     B Complex Vitamins (VITAMIN B-COMPLEX PO) Take 50 mg by mouth        CALCIUM-MAGNESIUM-VITAMIN D PO Take 2 tablets by mouth daily       cephALEXin (KEFLEX) 500 MG capsule Take 1 capsule (500 mg) by mouth 3 times daily 21 capsule 0     Cholecalciferol (VITAMIN D) 1000 UNIT capsule Take 2,000 Units by mouth daily        DiphenhydrAMINE HCl (BENADRYL PO) Take 50 mg by mouth daily as needed (monthly)       EPINEPHrine (EPIPEN/ADRENACLICK/OR ANY BX GENERIC EQUIV) 0.3 MG/0.3ML injection 2-pack Inject 0.3 mLs (0.3 mg) into the muscle once as needed for anaphylaxis 0.3 mL PRN     escitalopram (LEXAPRO) 20 MG tablet TAKE 1 TABLET(20 MG) BY MOUTH DAILY 90 tablet 1     fluconazole (DIFLUCAN) 150 MG tablet Take 1 tablet (150 mg) by mouth daily Take additional tablet 72 hours after first if symptoms persist. 2 tablet 1     fluticasone (FLONASE) 50 MCG/ACT nasal spray SHAKE LIQUID AND USE 2 SPRAYS IN EACH NOSTRIL DAILY 48 g 3     LORazepam (ATIVAN) 1 MG tablet Take 1 tablet (1 mg) by mouth every 6 hours as needed (nausea/vomiting, anxiety or sleep) 30 tablet 1     Magnesium Hydroxide (MAGNESIA PO) Take 500 mg by mouth       metFORMIN (GLUCOPHAGE-XR) 500 MG 24 hr tablet TAKE 2 TABLETS BY MOUTH EVERY DAY WITH THE EVENING MEAL 180 tablet 1     Multiple Vitamins-Minerals (MULTIVITAMIN ADULT PO) Take 1 tablet by mouth daily       nitroFURantoin macrocrystal-monohydrate (MACROBID) 100 MG capsule Take one capsule after intercourse as needed 30 capsule PRN     nystatin (MYCOSTATIN) 211710 UNIT/GM external powder Apply topically 2 times daily as needed Apply to folds under breast and abdomen. 60 g 1     Probiotic Product (PRO-BIOTIC BLEND PO) Take 1 capsule by mouth daily Enzymatic Therapy-probiotic pearls       rivaroxaban ANTICOAGULANT (XARELTO) 20  MG TABS tablet Take 1 tablet (20 mg) by mouth daily (with dinner) 90 tablet 4     tamoxifen (NOLVADEX) 10 MG tablet Cut each pill in 1/2 with pill cutter to ensure 5 mg daily dose. 45 tablet 3     tamoxifen (NOLVADEX) 10 MG tablet Take 0.5 tablets (5 mg) by mouth daily Cut each pill in 1/2 with pill cutter to ensure 5 mg daily dose. 45 tablet 1     traZODone (DESYREL) 50 MG tablet TAKE 1 TABLET  BY MOUTH EVERY NIGHT AS NEEDED FOR SLEEP 180 tablet PRN       Social History     Tobacco Use     Smoking status: Never Smoker     Smokeless tobacco: Never Used   Substance Use Topics     Alcohol use: Yes     Comment: Very infrequent, a bit of wine or beer 2x per month maybe     Drug use: Yes     Types: Marijuana     Comment: infrequent, for celebrations only, maybe 8x per year       Lore Bal  6/25/2021  7:25 AM

## 2021-06-25 NOTE — PROGRESS NOTES
ASSESSMENT AND PLAN  History of 3.3cm angiomyolipoma status post right nephrectomy 1/2018 at [place] Dr Dick.  -renal ultrasound to check on contralateral kidney  -Plan for renal ultrasound in 2-3 years.    Stressful Incontinence improving with pelvic floor physical therapy.  She would like [facility] with [facility] to discuss other options.    Stage 3 renal failure.    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 At that time she was incidentally found to have a 3 cm mass in the superior pole of the right kidney, suspicious for RCC. She had no symptoms of hematuria, flank pain, or abdominal pass at that time. She then underwent a right radical nephrectomy at [facility] in 1/2018. 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 Incidentally there was a 0.4 cm oncocytoma identified with the specimen as well.    Since then, the patient reports that she has been feeling well without hematuria, dysuria, changes in urination, flank pain, or abdominal masses. She notes more frequent UTIs since her gynecologic surgeries in 2017 that are well managed with nitrofurantoin. Ultimately she presents today to establish care in order to follow her past history of right renal angiomyolipoma.     The patient notes recent stress incontinence for which she is currently doing pelvic floor PT with improvement. 1 pad per day.  She would like to discuss other treatment options and follow up plans.    ECOG Performance Score: 0  0=Fully active, 1=Unable to do strenuous activity but capable of light work, 2=Ambulatory and capable of all selfcare, 3=Capable of limited selfcare, confined to bed >50% of waking hours, 4=Completely disabled.    This is a select list of notes I reviewed during this visit.  There may be additional notes I  reviewed which are note listed:    Reviewed Dr Dick 2018 post op note.     I reviewed the following laboratory data and went over findings with patient:  Recent Labs   Lab Test 01/14/20  0327 01/03/20  1001 09/27/19  1338 07/09/18  1351 11/14/17  0949 11/14/17  0949   WBC 6.7 7.7 5.8  --   --  4.9   HGB 11.1* 11.8 11.5* 11.5*   < > 9.4*    317 262  --   --  229    < > = values in this interval not displayed.     Recent Labs   Lab Test 03/27/20  1510 01/14/20  0327 01/03/20  1001 09/27/19  1338   CR 1.14* 1.30* 1.24* 1.15*   GFRESTIMATED 50* 43* 45* 50*   GFRESTBLACK 58* 50* 52* 58*   GLC 80 147* 118* 98

## 2021-06-28 ENCOUNTER — ANCILLARY PROCEDURE (OUTPATIENT)
Dept: ULTRASOUND IMAGING | Facility: CLINIC | Age: 66
End: 2021-06-28
Attending: UROLOGY
Payer: COMMERCIAL

## 2021-06-28 DIAGNOSIS — D17.71 ANGIOMYOLIPOMA OF KIDNEY: ICD-10-CM

## 2021-06-28 LAB
ANION GAP SERPL CALCULATED.3IONS-SCNC: 6 MMOL/L (ref 3–14)
BUN SERPL-MCNC: 25 MG/DL (ref 7–30)
CALCIUM SERPL-MCNC: 9.1 MG/DL (ref 8.5–10.1)
CHLORIDE SERPL-SCNC: 109 MMOL/L (ref 94–109)
CO2 SERPL-SCNC: 28 MMOL/L (ref 20–32)
CREAT SERPL-MCNC: 1.2 MG/DL (ref 0.52–1.04)
GFR SERPL CREATININE-BSD FRML MDRD: 47 ML/MIN/{1.73_M2}
GLUCOSE SERPL-MCNC: 96 MG/DL (ref 70–99)
POTASSIUM SERPL-SCNC: 4.4 MMOL/L (ref 3.4–5.3)
SODIUM SERPL-SCNC: 143 MMOL/L (ref 133–144)

## 2021-06-28 PROCEDURE — 36415 COLL VENOUS BLD VENIPUNCTURE: CPT | Performed by: PATHOLOGY

## 2021-06-28 PROCEDURE — 76770 US EXAM ABDO BACK WALL COMP: CPT | Mod: GC | Performed by: RADIOLOGY

## 2021-06-28 PROCEDURE — 80048 BASIC METABOLIC PNL TOTAL CA: CPT | Performed by: PATHOLOGY

## 2021-07-02 NOTE — RESULT ENCOUNTER NOTE
MsJuan Corona,   Enclosed are a copy of your recent laboratory tests.  I have reviewed these results.  The results are as I would expect and I don't think any further laboratory tests are necessary at this time.  There may be other results pending.  If there are, I will send there on to you when they are complete.  You should plan to follow-up as we discussed at your recent visit.  Please let me know if you have any questions.  Thank you for choosing the [facility] for your care.  ALFREDITO Mercado MD.

## 2021-07-02 NOTE — RESULT ENCOUNTER NOTE
MsJuan Corona,   Enclosed are a copy of your recent laboratory tests.  I have reviewed these results.  The results are as I would expect and I don't think any further laboratory tests are necessary at this time.  There may be other results pending.  If there are, I will send there on to you when they are complete.  You should plan to follow-up as we discussed at your recent visit.  Please let me know if you have any questions.  Thank you for choosing the [facility] for your care.  ALFREDITO Mercado MD.

## 2021-07-02 NOTE — RESULT ENCOUNTER NOTE
MsJuan Corona,  Your urinalysis showed blood in your urine.  The standard work-up for this is cystoscopy and ct scan.  I will plan to schedule these for you to make sure there are not any additional issues with your bladder or kidney.    Thank you, Zachery Mercado.

## 2021-07-03 ENCOUNTER — HEALTH MAINTENANCE LETTER (OUTPATIENT)
Age: 66
End: 2021-07-03

## 2021-07-18 ENCOUNTER — APPOINTMENT (OUTPATIENT)
Dept: CT IMAGING | Facility: CLINIC | Age: 66
End: 2021-07-18
Attending: EMERGENCY MEDICINE
Payer: COMMERCIAL

## 2021-07-18 ENCOUNTER — HOSPITAL ENCOUNTER (EMERGENCY)
Facility: CLINIC | Age: 66
Discharge: HOME | End: 2021-07-19
Attending: EMERGENCY MEDICINE | Admitting: EMERGENCY MEDICINE
Payer: COMMERCIAL

## 2021-07-18 DIAGNOSIS — R10.84 ABDOMINAL PAIN, GENERALIZED: ICD-10-CM

## 2021-07-18 LAB
ALBUMIN SERPL-MCNC: 3.8 G/DL (ref 3.4–5)
ALP SERPL-CCNC: 82 U/L (ref 40–150)
ALT SERPL W P-5'-P-CCNC: 18 U/L (ref 0–50)
ANION GAP SERPL CALCULATED.3IONS-SCNC: 5 MMOL/L (ref 3–14)
AST SERPL W P-5'-P-CCNC: 18 U/L (ref 0–45)
ATRIAL RATE - MUSE: 80 BPM
BASOPHILS # BLD AUTO: 0 10E3/UL (ref 0–0.2)
BASOPHILS NFR BLD AUTO: 0 %
BILIRUB SERPL-MCNC: 0.3 MG/DL (ref 0.2–1.3)
BUN SERPL-MCNC: 21 MG/DL (ref 7–30)
CALCIUM SERPL-MCNC: 9.1 MG/DL (ref 8.5–10.1)
CHLORIDE BLD-SCNC: 111 MMOL/L (ref 94–109)
CO2 SERPL-SCNC: 26 MMOL/L (ref 20–32)
CREAT SERPL-MCNC: 1.19 MG/DL (ref 0.52–1.04)
DIASTOLIC BLOOD PRESSURE - MUSE: NORMAL MMHG
EOSINOPHIL # BLD AUTO: 0.1 10E3/UL (ref 0–0.7)
EOSINOPHIL NFR BLD AUTO: 1 %
ERYTHROCYTE [DISTWIDTH] IN BLOOD BY AUTOMATED COUNT: 13.4 % (ref 10–15)
GFR SERPL CREATININE-BSD FRML MDRD: 47 ML/MIN/1.73M2
GLUCOSE BLD-MCNC: 127 MG/DL (ref 70–99)
HCT VFR BLD AUTO: 36.8 % (ref 35–47)
HGB BLD-MCNC: 11.9 G/DL (ref 11.7–15.7)
HOLD SPECIMEN: NORMAL
HOLD SPECIMEN: NORMAL
IMM GRANULOCYTES # BLD: 0 10E3/UL
IMM GRANULOCYTES NFR BLD: 0 %
INTERPRETATION ECG - MUSE: NORMAL
LIPASE SERPL-CCNC: 202 U/L (ref 73–393)
LYMPHOCYTES # BLD AUTO: 1.9 10E3/UL (ref 0.8–5.3)
LYMPHOCYTES NFR BLD AUTO: 23 %
MCH RBC QN AUTO: 28.7 PG (ref 26.5–33)
MCHC RBC AUTO-ENTMCNC: 32.3 G/DL (ref 31.5–36.5)
MCV RBC AUTO: 89 FL (ref 78–100)
MONOCYTES # BLD AUTO: 0.5 10E3/UL (ref 0–1.3)
MONOCYTES NFR BLD AUTO: 6 %
NEUTROPHILS # BLD AUTO: 5.9 10E3/UL (ref 1.6–8.3)
NEUTROPHILS NFR BLD AUTO: 70 %
NRBC # BLD AUTO: 0 10E3/UL
NRBC BLD AUTO-RTO: 0 /100
P AXIS - MUSE: 47 DEGREES
PLATELET # BLD AUTO: 249 10E3/UL (ref 150–450)
POTASSIUM BLD-SCNC: 3.8 MMOL/L (ref 3.4–5.3)
PR INTERVAL - MUSE: 120 MS
PROT SERPL-MCNC: 7.3 G/DL (ref 6.8–8.8)
QRS DURATION - MUSE: 88 MS
QT - MUSE: 404 MS
QTC - MUSE: 465 MS
R AXIS - MUSE: 45 DEGREES
RBC # BLD AUTO: 4.14 10E6/UL (ref 3.8–5.2)
SODIUM SERPL-SCNC: 142 MMOL/L (ref 133–144)
SYSTOLIC BLOOD PRESSURE - MUSE: NORMAL MMHG
T AXIS - MUSE: 53 DEGREES
VENTRICULAR RATE- MUSE: 80 BPM
WBC # BLD AUTO: 8.5 10E3/UL (ref 4–11)

## 2021-07-18 PROCEDURE — 74177 CT ABD & PELVIS W/CONTRAST: CPT | Mod: 26 | Performed by: RADIOLOGY

## 2021-07-18 PROCEDURE — 99285 EMERGENCY DEPT VISIT HI MDM: CPT | Mod: 25 | Performed by: EMERGENCY MEDICINE

## 2021-07-18 PROCEDURE — 93010 ELECTROCARDIOGRAM REPORT: CPT | Performed by: EMERGENCY MEDICINE

## 2021-07-18 PROCEDURE — 36592 COLLECT BLOOD FROM PICC: CPT | Performed by: EMERGENCY MEDICINE

## 2021-07-18 PROCEDURE — 83690 ASSAY OF LIPASE: CPT | Performed by: EMERGENCY MEDICINE

## 2021-07-18 PROCEDURE — 85025 COMPLETE CBC W/AUTO DIFF WBC: CPT | Performed by: EMERGENCY MEDICINE

## 2021-07-18 PROCEDURE — 99285 EMERGENCY DEPT VISIT HI MDM: CPT | Mod: 25

## 2021-07-18 PROCEDURE — 82040 ASSAY OF SERUM ALBUMIN: CPT | Performed by: EMERGENCY MEDICINE

## 2021-07-18 PROCEDURE — 81003 URINALYSIS AUTO W/O SCOPE: CPT | Performed by: EMERGENCY MEDICINE

## 2021-07-18 PROCEDURE — 93005 ELECTROCARDIOGRAM TRACING: CPT

## 2021-07-18 PROCEDURE — 74177 CT ABD & PELVIS W/CONTRAST: CPT

## 2021-07-18 PROCEDURE — 250N000011 HC RX IP 250 OP 636: Performed by: EMERGENCY MEDICINE

## 2021-07-18 RX ORDER — IOPAMIDOL 755 MG/ML
126 INJECTION, SOLUTION INTRAVASCULAR ONCE
Status: COMPLETED | OUTPATIENT
Start: 2021-07-18 | End: 2021-07-18

## 2021-07-18 RX ADMIN — IOPAMIDOL 126 ML: 755 INJECTION, SOLUTION INTRAVENOUS at 23:10

## 2021-07-18 ASSESSMENT — MIFFLIN-ST. JEOR: SCORE: 1434

## 2021-07-18 ASSESSMENT — ENCOUNTER SYMPTOMS
ABDOMINAL PAIN: 1
CHILLS: 0
EYE PAIN: 0
SHORTNESS OF BREATH: 0
FEVER: 0
PALPITATIONS: 0
VOMITING: 0
CHEST TIGHTNESS: 0
CONSTIPATION: 0
DIARRHEA: 0
DIZZINESS: 0
COUGH: 0
NECK PAIN: 0
FATIGUE: 1
ARTHRALGIAS: 0
SORE THROAT: 0
DYSURIA: 0
ABDOMINAL DISTENTION: 0
MYALGIAS: 0
HEADACHES: 0
DIFFICULTY URINATING: 0
COLOR CHANGE: 0
BACK PAIN: 0
FREQUENCY: 0
CONFUSION: 0
NAUSEA: 0
WEAKNESS: 0

## 2021-07-19 VITALS
TEMPERATURE: 99 F | WEIGHT: 205 LBS | BODY MASS INDEX: 36.32 KG/M2 | DIASTOLIC BLOOD PRESSURE: 77 MMHG | HEART RATE: 85 BPM | HEIGHT: 63 IN | OXYGEN SATURATION: 96 % | RESPIRATION RATE: 18 BRPM | SYSTOLIC BLOOD PRESSURE: 139 MMHG

## 2021-07-19 LAB
ALBUMIN UR-MCNC: NEGATIVE MG/DL
APPEARANCE UR: CLEAR
BILIRUB UR QL STRIP: NEGATIVE
COLOR UR AUTO: COLORLESS
GLUCOSE UR STRIP-MCNC: NEGATIVE MG/DL
HGB UR QL STRIP: ABNORMAL
KETONES UR STRIP-MCNC: 80 MG/DL
LEUKOCYTE ESTERASE UR QL STRIP: NEGATIVE
NITRATE UR QL: NEGATIVE
PH UR STRIP: 7 [PH] (ref 5–7)
RBC URINE: 3 /HPF
RENAL TUB EPI: <1 /HPF
SP GR UR STRIP: 1.01 (ref 1–1.03)
SQUAMOUS EPITHELIAL: 1 /HPF
UROBILINOGEN UR STRIP-MCNC: NORMAL MG/DL
WBC URINE: 1 /HPF

## 2021-07-19 NOTE — ED TRIAGE NOTES
Pt BIBA for sudden severe abdominal pain from home.  Pt reports that at 2000 pain started after bending over.  Pt states slight relief with sprite and bowel movement, but pain returned.  Pt states she took pepto with no relief.

## 2021-07-19 NOTE — DISCHARGE INSTRUCTIONS
Your work-up in the emergency department including CT scan, labs, and urinalysis did not reveal any serious medical problems.  The exact cause of your abdominal pain is unclear at this time.  Since your symptoms have improved, we have decided on discharge.  Please follow-up with your primary care physician and return to the ED with any new or worsening symptoms.

## 2021-07-19 NOTE — ED PROVIDER NOTES
"ED Provider Note  [facility]      History     Chief Complaint   Patient presents with     Abdominal Pain     The history is provided by the patient and medical records.     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  Patient states that at 8:10 PM today she was bending over to  dog poop, stood up, and a few seconds later had severe onset of abdominal pain.  She states that she thought she possibly needed to use the bathroom so she went and had a small BM, but the pain was still there.  She states she started sweating.  Patient states that the pain felt like pressure and like there was \"gurgling\"in her abdomen.  She states it was mainly left-sided abdominal pain.  She states that the pain went up into her chest and that she was scared she was having an MI.  Patient states that she sipped Sprite and burped and felt a little relief, but the pain kept returning.  Patient states that she was fatigued today before the onset of abdominal pain.  Patient denies fevers, chills.  Patient states she has been eating and drinking normally.  Patient denies diarrhea, blood in stool, urinary problems, vaginal bleeding.  Patient states that she is not on chemotherapy currently.  Patient reports that she gets surveillance scans due to her past ovarian cancer, with her last scan being in 4/2021.      Past Medical History  Past Medical History:   Diagnosis Date     Anxiety state, unspecified     [id number]     Arthritis 2014    vague, gradual, not treated really, knees feel it     At high risk for breast cancer 09/27/2019    30.3% lifetime risk by SHERON model     Attention deficit disorder without mention of " hyperactivity      Cancer (H) 7/2017 and 1/2018    ovarian and kidney cancers, both treated well     Chronic rhinitis      Depressive disorder lifetime    plus Anxiety plus ADD. Escitalipram, therapy, ADD meds maybe     Depressive disorder, not elsewhere classified      Heart disease 1999    tachycardia and high cholesterol, managed     History of blood transfusion 7/2017    while in hospital for ovarian cancer     Hypertension 1999    tho BP was too LOW last week. past 12 years Generally OK     Panic disorder without agoraphobia      Pure hypercholesterolemia     Lipitor     Tachycardia      Tachycardia, unspecified     9/1999-2/2004     Type II or unspecified type diabetes mellitus without mention of complication, not stated as uncontrolled     diagnosed 2004     Past Surgical History:   Procedure Laterality Date     ABSCESS DRAINAGE Left     left leg      AS REMV KIDNEY,RADICAL Right      BIOPSY  7/2017 and 2/2018    ovarian and kidney cancer biopsies. also 1987 breast benign     BREAST CYST EXCISION  1985     BREAST SURGERY  1987    date unsure. benign breast cyst removed     CATARACT IOL, RT/LT Left 05/18/2018     CATARACT IOL, RT/LT Right 07/1318     COLONOSCOPY  2008 and 2013    polyps removed. Next due fall 2018     ENDOMETRIAL ABLATION  2008     HYSTERECTOMY TOTAL ABDOMINAL, BILATERAL SALPINGO-OOPHORECTOMY, NODE DISSECTION, COMBINED Left 7/7/2017    Procedure: COMBINED HYSTERECTOMY TOTAL ABDOMINAL, SALPINGO-OOPHORECTOMY, NODE DISSECTION;  Exploratory Laparotomy, Left Salpingo-Oophorectomy, Cancer Staging, Total Hysterectomy, Omentectomy, Evacuation of abdominal fluid, Lymph Node Dissection  Anesthesia Block ;  Surgeon: Serena Cole MD;  Location: [facility] OR     HYSTERECTOMY, PAP NO LONGER INDICATED  07/07/2017    Laparotomy; for ovarian cancer staging     HYSTERECTOMY, PAP NO LONGER INDICATED       INSERT PORT VASCULAR ACCESS Right 8/21/2017    Procedure: INSERT PORT VASCULAR ACCESS;  Single Lumen  Chest Power Port;  Surgeon: Stephen Mike PA-C;  Location: [facility] OR     LYMPHADENECTOMY RETROPERITONEAL Bilateral 07/07/2017    Laparotomy; pelvics & para-aortics; for ovarian cancer staging     OMENTECTOMY  07/07/2017    Laparotomy; for ovarian cancer staging     ORTHOPEDIC SURGERY  1/2002    broken left jaw due to fall, 4 fractures, titanium plates     OTHER SURGICAL HISTORY  01/2002    OTHER SURGICAL HISTORYfacial surgery due to fall      PHACOEMULSIFICATION CLEAR CORNEA WITH STANDARD INTRAOCULAR LENS IMPLANT Right 7/13/2018    Procedure: PHACOEMULSIFICATION CLEAR CORNEA WITH STANDARD INTRAOCULAR LENS IMPLANT;  RIght Eye Phacoemulsification Clear Cornea with Standard Intraocular Lens Placement ;  Surgeon: Mary Jo Massey MD;  Location: [facility] OR     PHACOEMULSIFICATION CLEAR CORNEA WITH TORIC INTRAOCULAR LENS IMPLANT Left 5/18/2018    Procedure: PHACOEMULSIFICATION CLEAR CORNEA WITH TORIC INTRAOCULAR LENS IMPLANT;  Left Eye Phacoemulsification with Toric Lens;  Surgeon: Mary Jo Massey MD;  Location: [facility] OR     SALPINGO OOPHORECTOMY,R/L/KAYY Right 2008    Salpingo Oophorectomy, RT     SALPINGO OOPHORECTOMY,R/L/KAYY Left 07/07/2017    Laparotomy; for ovarian cancer staging     SALPINGOOPHORECTOMY  2008     SURGICAL HISTORY OF -       facial surgery d/t fall in 1/2002     SURGICAL HISTORY OF -       1985 removal of breast cyst     SURGICAL HISTORY OF -   2008    endometrial ablation     YAG CAPSULOTOMY OD (RIGHT EYE)  10/22/2018     Acetaminophen (TYLENOL PO)  atorvastatin (LIPITOR) 80 MG tablet  B Complex Vitamins (VITAMIN B-COMPLEX PO)  CALCIUM-MAGNESIUM-VITAMIN D PO  cephALEXin (KEFLEX) 500 MG capsule  Cholecalciferol (VITAMIN D) 1000 UNIT capsule  DiphenhydrAMINE HCl (BENADRYL PO)  EPINEPHrine (EPIPEN/ADRENACLICK/OR ANY BX GENERIC EQUIV) 0.3 MG/0.3ML injection 2-pack  escitalopram (LEXAPRO) 20 MG tablet  fluconazole (DIFLUCAN) 150 MG tablet  fluticasone (FLONASE) 50 MCG/ACT nasal spray  LORazepam (ATIVAN) 1  MG tablet  Magnesium Hydroxide (MAGNESIA PO)  metFORMIN (GLUCOPHAGE-XR) 500 MG 24 hr tablet  Multiple Vitamins-Minerals (MULTIVITAMIN ADULT PO)  nitroFURantoin macrocrystal-monohydrate (MACROBID) 100 MG capsule  nystatin (MYCOSTATIN) 718377 UNIT/GM external powder  Probiotic Product (PRO-BIOTIC BLEND PO)  rivaroxaban ANTICOAGULANT (XARELTO) 20 MG TABS tablet  tamoxifen (NOLVADEX) 10 MG tablet  tamoxifen (NOLVADEX) 10 MG tablet  traZODone (DESYREL) 50 MG tablet      Allergies   Allergen Reactions     Paclitaxel Anaphylaxis and Difficulty breathing     Wasps [Hornets] Anaphylaxis     Yellow Hornet Venom [Hornet Venom] Anaphylaxis and Swelling     Yellow Jacket Venom [Venomil Honey Bee] Anaphylaxis and Swelling     Sulfa Drugs Hives and Rash     Family History  Family History   Problem Relation Age of Onset     C.A.D. Father      Diabetes Father      Hypertension Father      Cerebrovascular Disease Father         1984 or      Psychotic Disorder Father      Pancreatic Cancer Father      Coronary Artery Disease Father      Hyperlipidemia Father         treated w statins     Other Cancer Father         pancreatic, ,  of this     Depression Father      Mental Illness Father         likely PTSD and depression     Obesity Father      C.A.D. Mother      Hypertension Mother      Breast Cancer Mother      Psychotic Disorder Mother      Colon Cancer Mother      Cancer Mother         Bone cancer     Coronary Artery Disease Mother      Hyperlipidemia Mother         treated w. statins     Other Cancer Mother         bone/marrow, ,  of this     Depression Mother      Anxiety Disorder Mother      Mental Illness Mother         various undiagnosed types, but THERE     Obesity Mother      Prostate Problems Brother         cleared      Hypertension Brother      Hyperlipidemia Brother         unsure if treated w statins     Depression Brother      Anxiety Disorder Brother      Mental Illness Brother          undiagnosed but THERE     Obesity Brother      Psychotic Disorder Sister         x2     Neurologic Disorder Sister      Hypertension Sister      Hyperlipidemia Sister         treated w statins     Depression Sister      Anxiety Disorder Sister      Obesity Sister      Psychotic Disorder Brother         x2     Depression Brother         major depressive disorder and OCD     Anxiety Disorder Brother      Mental Illness Brother         not sure of diagnoses, treated     Hypertension Brother      Hyperlipidemia Brother      Psychotic Disorder Maternal Grandmother         ?     Coronary Artery Disease Maternal Grandmother          of heart attack age 84?     Depression Maternal Grandmother      Psychotic Disorder Maternal Grandfather         ?     Psychotic Disorder Paternal Grandmother         Schizophernia     Coronary Artery Disease Paternal Grandmother          of heart attack, age 85?     Depression Paternal Grandmother         severe, but recovered     Mental Illness Paternal Grandmother         possible bipolar or other     Psychotic Disorder Paternal Grandfather         ?     Respiratory Paternal Grandfather          of emphysema and smoking     Psychotic Disorder Sister      Other - See Comments Sister         small kidney stone      Hypertension Sister      Hyperlipidemia Sister         treated w statins     Depression Sister      Anxiety Disorder Sister      Cancer - colorectal No family hx of      Glaucoma No family hx of      Macular Degeneration No family hx of      Bone Cancer Mother      Other - See Comments Mother         psychotic disease      Other - See Comments Father         cerebrovascular disease, psychotic disease      Other - See Comments Sister         psychotic disease      Prostate Cancer Brother      Schizophrenia Paternal Grandmother      Emphysema Paternal Grandfather      Depression Brother      Anxiety Disorder Brother      Other - See Comments Brother         psychotic  "disease      Social History   Social History     Tobacco Use     Smoking status: Never Smoker     Smokeless tobacco: Never Used   Substance Use Topics     Alcohol use: Yes     Comment: Very infrequent, a bit of wine or beer 2x per month maybe     Drug use: Yes     Types: Marijuana     Comment: infrequent, for celebrations only, maybe 8x per year      Past medical history, past surgical history, medications, allergies, family history, and social history were reviewed with the patient. No additional pertinent items.       Review of Systems   Constitutional: Positive for fatigue. Negative for chills and fever.   HENT: Negative for congestion and sore throat.    Eyes: Negative for pain and visual disturbance.   Respiratory: Negative for cough, chest tightness and shortness of breath.    Cardiovascular: Positive for chest pain. Negative for palpitations.   Gastrointestinal: Positive for abdominal pain. Negative for abdominal distention, constipation, diarrhea, nausea and vomiting.   Genitourinary: Negative for difficulty urinating, dysuria, frequency, urgency and vaginal bleeding.   Musculoskeletal: Negative for arthralgias, back pain, myalgias and neck pain.   Skin: Negative for color change and rash.   Neurological: Negative for dizziness, weakness and headaches.   Psychiatric/Behavioral: Negative for confusion.       Physical Exam   BP: (!) 169/107  Pulse: 81  Temp: 99  F (37.2  C)  Resp: 16  Height: 160 cm (5' 3\")  Weight: 93 kg (205 lb)  SpO2: 99 %  Physical Exam  Vitals and nursing note reviewed.   Constitutional:       General: She is not in acute distress.     Appearance: Normal appearance. She is not ill-appearing or toxic-appearing.   HENT:      Head: Normocephalic and atraumatic.      Nose: Nose normal.      Mouth/Throat:      Mouth: Mucous membranes are moist.   Eyes:      Pupils: Pupils are equal, round, and reactive to light.   Cardiovascular:      Rate and Rhythm: Normal rate.      Pulses: Normal pulses. "      Heart sounds: Normal heart sounds.   Pulmonary:      Effort: Pulmonary effort is normal. No respiratory distress.      Breath sounds: Normal breath sounds.   Abdominal:      General: Abdomen is flat. There is no distension.      Palpations: Abdomen is soft.      Tenderness: There is no abdominal tenderness. There is no right CVA tenderness, left CVA tenderness or guarding.   Musculoskeletal:         General: No swelling or deformity. Normal range of motion.      Cervical back: Normal range of motion. No rigidity.   Skin:     General: Skin is warm.      Capillary Refill: Capillary refill takes less than 2 seconds.   Neurological:      Mental Status: She is alert and oriented to person, place, and time.   Psychiatric:         Mood and Affect: Mood normal.         ED Course   10:24 PM  The patient was seen and examined by Wilmar Elder DO in Room ED14.   ED Course as of Jul 19 0130   Sun Jul 18, 2021 2221      EKG Interpretation:     Interpreted by Wilmar Elder DO  Time reviewed:2221   Symptoms at time of EKG: abdominal pain   Rhythm: normal sinus   Rate: normal  Axis: NORMAL  Ectopy: none  Conduction: normal  ST Segments/ T Waves: No ST-T wave changes  Q Waves: none  Comparison to prior: No old EKG available    Clinical Impression: normal EKG          Procedures       The medical record was reviewed and interpreted.  Current labs reviewed and interpreted.  Current images reviewed and interpreted: No acute abd process.       Results for orders placed or performed during the hospital encounter of 07/18/21   CT Abdomen Pelvis w Contrast     Status: None    Narrative    EXAM: CT ABDOMEN PELVIS W CONTRAST  LOCATION: [facility], [place]  DATE/TIME: 7/18/2021 11:12 PM    INDICATION: Periumbilical and left lower quadrant pain. History of ovarian cancer.  COMPARISON: Images but not report available from CT abdomen and pelvis of 05/16/2018.  TECHNIQUE: CT scan of the abdomen and pelvis was performed following  injection of IV contrast. Multiplanar reformats were obtained. Dose reduction techniques were used.  CONTRAST: 126 ml isovue 370     FINDINGS:   LOWER CHEST: Normal.    HEPATOBILIARY: Normal.    PANCREAS: Normal.    SPLEEN: Normal.    ADRENAL GLANDS: Normal.    KIDNEYS/BLADDER: Post right nephrectomy. Normal left kidney. Urinary bladder decompressed.    BOWEL: Colonic diverticulosis. No bowel obstruction or inflammation. Normal appendix. No free fluid or gas.    LYMPH NODES: No enlarged lymph nodes visualized.    VASCULATURE: Unremarkable.    PELVIC ORGANS: Post hysterectomy and oophorectomy.    MUSCULOSKELETAL: Small fat-containing periumbilical hernia. Small fat-containing right inguinal hernia. Degenerative changes of the spine. Mild grade 1 anterolisthesis of L4 over L5.      Impression    IMPRESSION:   1.  No acute process in the abdomen or pelvis.  2.  Colonic diverticulosis without evidence for diverticulitis.  3.  Post right nephrectomy, hysterectomy, and oophorectomy.   Comprehensive metabolic panel     Status: Abnormal   Result Value Ref Range    Sodium 142 133 - 144 mmol/L    Potassium 3.8 3.4 - 5.3 mmol/L    Chloride 111 (H) 94 - 109 mmol/L    Carbon Dioxide (CO2) 26 20 - 32 mmol/L    Anion Gap 5 3 - 14 mmol/L    Urea Nitrogen 21 7 - 30 mg/dL    Creatinine 1.19 (H) 0.52 - 1.04 mg/dL    Calcium 9.1 8.5 - 10.1 mg/dL    Glucose 127 (H) 70 - 99 mg/dL    Alkaline Phosphatase 82 40 - 150 U/L    AST 18 0 - 45 U/L    ALT 18 0 - 50 U/L    Protein Total 7.3 6.8 - 8.8 g/dL    Albumin 3.8 3.4 - 5.0 g/dL    Bilirubin Total 0.3 0.2 - 1.3 mg/dL    GFR Estimate 47 (L) >60 mL/min/1.73m2   Lipase     Status: Normal   Result Value Ref Range    Lipase 202 73 - 393 U/L   UA with Microscopic reflex to Culture     Status: Abnormal    Specimen: Urine, Midstream   Result Value Ref Range    Color Urine Colorless Colorless, Straw, Light Yellow, Yellow    Appearance Urine Clear Clear    Glucose Urine Negative Negative mg/dL     Bilirubin Urine Negative Negative    Ketones Urine 80  (A) Negative mg/dL    Specific Gravity Urine 1.015 1.003 - 1.035    Blood Urine Trace (A) Negative    pH Urine 7.0 5.0 - 7.0    Protein Albumin Urine Negative Negative mg/dL    Urobilinogen Urine Normal Normal, 2.0 mg/dL    Nitrite Urine Negative Negative    Leukocyte Esterase Urine Negative Negative    RBC Urine 3 (H) <=2 /HPF    WBC Urine 1 <=5 /HPF    Squamous Epithelials Urine 1 <=1 /HPF    Renal Tubular Epithelials Urine <1 None Seen /HPF    Narrative    Urine Culture not indicated   CBC with platelets and differential     Status: None   Result Value Ref Range    WBC Count 8.5 4.0 - 11.0 10e3/uL    RBC Count 4.14 3.80 - 5.20 10e6/uL    Hemoglobin 11.9 11.7 - 15.7 g/dL    Hematocrit 36.8 35.0 - 47.0 %    MCV 89 78 - 100 fL    MCH 28.7 26.5 - 33.0 pg    MCHC 32.3 31.5 - 36.5 g/dL    RDW 13.4 10.0 - 15.0 %    Platelet Count 249 150 - 450 10e3/uL    % Neutrophils 70 %    % Lymphocytes 23 %    % Monocytes 6 %    % Eosinophils 1 %    % Basophils 0 %    % Immature Granulocytes 0 %    NRBCs per 100 WBC 0 <1 /100    Absolute Neutrophils 5.9 1.6 - 8.3 10e3/uL    Absolute Lymphocytes 1.9 0.8 - 5.3 10e3/uL    Absolute Monocytes 0.5 0.0 - 1.3 10e3/uL    Absolute Eosinophils 0.1 0.0 - 0.7 10e3/uL    Absolute Basophils 0.0 0.0 - 0.2 10e3/uL    Absolute Immature Granulocytes 0.0 <=0.0 10e3/uL    Absolute NRBCs 0.0 10e3/uL   Extra Blue Top Tube     Status: None   Result Value Ref Range    Hold Specimen JIC    Extra Red Top Tube     Status: None   Result Value Ref Range    Hold Specimen JIC    EKG 12 lead     Status: None   Result Value Ref Range    Systolic Blood Pressure  mmHg    Diastolic Blood Pressure  mmHg    Ventricular Rate 80 BPM    Atrial Rate 80 BPM    NY Interval 120 ms    QRS Duration 88 ms     ms    QTc 465 ms    P Axis 47 degrees    R AXIS 45 degrees    T Axis 53 degrees    Interpretation ECG       Sinus rhythm  Normal ECG  Unconfirmed report -  interpretation of this ECG is computer generated - see medical record for final interpretation  Confirmed by - EMERGENCY ROOM, PHYSICIAN ([id number]),  DOE KILPATRICK ([id number]) on 7/18/2021 10:40:39 PM     CBC with platelets differential     Status: None    Narrative    The following orders were created for panel order CBC with platelets differential.  Procedure                               Abnormality         Status                     ---------                               -----------         ------                     CBC with platelets and d...[[id number]]                      Final result                 Please view results for these tests on the individual orders.   Gilbert Draw     Status: None    Narrative    The following orders were created for panel order Gilbert Draw.  Procedure                               Abnormality         Status                     ---------                               -----------         ------                     Extra Blue Top Tube[[id number]]                              Final result               Extra Red Top Tube[[id number]]                               Final result                 Please view results for these tests on the individual orders.     Medications   iopamidol (ISOVUE-370) solution 126 mL (126 mLs Intravenous Given 7/18/21 2310)   sodium chloride (PF) 0.9% PF flush 80 mL (80 mLs Intravenous Given 7/18/21 2310)        Assessments & Plan (with Medical Decision Making)   Patient resents for evaluation of left lower quadrant abdominal pain.    Differential diagnosis includes diverticulitis, ureterolithiasis, pyelonephritis, diverticulitis, colitis, constipation, UTI, appendicitis, complication/metastasis from history of ovarian cancer, small bowel obstruction    On arrival, patient has normal vital signs.  She overall appears well and nontoxic.  Her abdominal pain is actually resolved by the time of my exam.  Her abdomen is soft and nontender.  No CVA tenderness.      Labs notable for glucose 127, white blood cell count 8.5, sodium 142, potassium 3.8, creatinine 1.19.    CT abdomen pelvis without evidence of small bowel obstruction, diverticulitis, colitis or other acute pathology.    Urinalysis without signs of infection.    Patient has continued to remain pain-free while in the ED aside from a mild lingering pain in her left flank which she rates a 1 out of 10 and improving.  Exact cause of her symptoms is unclear at this time, could be related to mild indigestion given improvement with Pepto-Bismol.  Passed kidney stone possibility as well given trace amount of blood in the urine, however, no hydro on CT, and overall less likely given clinical presentation.    Patient structured to follow-up with primary care physician and continue OTC antacids as needed.  Discussed reasons to return to the emergency department.        I have reviewed the nursing notes. I have reviewed the findings, diagnosis, plan and need for follow up with the patient.    Discharge Medication List as of 7/19/2021 12:59 AM          Final diagnoses:   Abdominal pain, generalized     ISheron, am serving as a trained medical scribe to document services personally performed by Wilmar Elder DO, based on the provider's statements to me.     IWilmar DO, was physically present and have reviewed and verified the accuracy of this note documented by Sheron Siddiqui.  --  Wilmar Elder DO  [facility] EMERGENCY DEPARTMENT  7/18/2021     Wilmar Elder DO  07/19/21 0131

## 2021-07-25 ENCOUNTER — TELEPHONE (OUTPATIENT)
Dept: NURSING | Facility: CLINIC | Age: 66
End: 2021-07-25

## 2021-07-25 DIAGNOSIS — Z87.440 HISTORY OF RECURRENT URINARY TRACT INFECTION: ICD-10-CM

## 2021-07-25 NOTE — TELEPHONE ENCOUNTER
Patient called and stated she is currently in [place]. Notified patient that I am unable to triage due to being out of state. Patient wanted to explain the reason for the call to see if I can assist in any way. Patient has a history of UTI and is currently experiencing symptoms. Patient would like to do a virtual appointment with a provider and would like to avoid going into any clinic in [place] due to insurance. Notified patient that best option is to be seen in a [place] clinic or ED. Virtual appointments are not possible due to being out of state.    Maegan Nuñez RN   07/25/21 1:11 AM  [facility] Nurse Advisor

## 2021-07-27 ENCOUNTER — TELEPHONE (OUTPATIENT)
Dept: FAMILY MEDICINE | Facility: CLINIC | Age: 66
End: 2021-07-27

## 2021-07-27 ENCOUNTER — OFFICE VISIT (OUTPATIENT)
Dept: URGENT CARE | Facility: URGENT CARE | Age: 66
End: 2021-07-27
Payer: COMMERCIAL

## 2021-07-27 VITALS
TEMPERATURE: 98.6 F | BODY MASS INDEX: 36.32 KG/M2 | HEART RATE: 90 BPM | RESPIRATION RATE: 16 BRPM | SYSTOLIC BLOOD PRESSURE: 128 MMHG | OXYGEN SATURATION: 97 % | WEIGHT: 205 LBS | HEIGHT: 63 IN | DIASTOLIC BLOOD PRESSURE: 82 MMHG

## 2021-07-27 DIAGNOSIS — R30.0 DYSURIA: Primary | ICD-10-CM

## 2021-07-27 LAB
ALBUMIN UR-MCNC: NEGATIVE MG/DL
APPEARANCE UR: CLEAR
BILIRUB UR QL STRIP: NEGATIVE
CLUE CELLS: ABNORMAL
COLOR UR AUTO: YELLOW
GLUCOSE UR STRIP-MCNC: NEGATIVE MG/DL
HGB UR QL STRIP: NEGATIVE
KETONES UR STRIP-MCNC: NEGATIVE MG/DL
LEUKOCYTE ESTERASE UR QL STRIP: NEGATIVE
NITRATE UR QL: NEGATIVE
PH UR STRIP: 5 [PH] (ref 5–7)
SP GR UR STRIP: >=1.03 (ref 1–1.03)
TRICHOMONAS, WET PREP: ABNORMAL
UROBILINOGEN UR STRIP-ACNC: 0.2 E.U./DL
WBC'S/HIGH POWER FIELD, WET PREP: ABNORMAL
YEAST, WET PREP: ABNORMAL

## 2021-07-27 PROCEDURE — 87086 URINE CULTURE/COLONY COUNT: CPT | Performed by: FAMILY MEDICINE

## 2021-07-27 PROCEDURE — 99214 OFFICE O/P EST MOD 30 MIN: CPT | Performed by: FAMILY MEDICINE

## 2021-07-27 PROCEDURE — 81003 URINALYSIS AUTO W/O SCOPE: CPT | Performed by: FAMILY MEDICINE

## 2021-07-27 PROCEDURE — 87210 SMEAR WET MOUNT SALINE/INK: CPT | Performed by: FAMILY MEDICINE

## 2021-07-27 RX ORDER — NITROFURANTOIN 25; 75 MG/1; MG/1
CAPSULE ORAL
Qty: 30 CAPSULE | Status: SHIPPED | OUTPATIENT
Start: 2021-07-27 | End: 2021-09-28

## 2021-07-27 ASSESSMENT — MIFFLIN-ST. JEOR: SCORE: 1434

## 2021-07-27 NOTE — TELEPHONE ENCOUNTER
Reason for Call: Other call back    Detailed comments: Patient is requesting an order for a UA as she suspects she has a UTI. Please assist. She is hoping to leave a urine sample today. Thanks!    Phone Number Patient can be reached at: Cell number on file:    Telephone Information:   Mobile [phone]     Best Time: Any    Can we leave a detailed message on this number? YES    Call taken on 7/27/2021 at 9:04 AM by Dang Mojica

## 2021-07-27 NOTE — PROGRESS NOTES
Chief Complaint   Patient presents with     Urgent Care     UTI     concern of UTI. Saturday at midnight had uti symptoms, urgency, pain, frequency and blood in urine. Has taken 2 macrobid in past 2 days.        Medical Decision Making:    ASSESMENT AND PLAN     Hafsa was seen today for urgent care and uti.    Diagnoses and all orders for this visit:    Dysuria  -     UA Macro with Reflex to Micro and Culture - lab collect; Future  -     UA Macro with Reflex to Micro and Culture - lab collect  -     Wet prep - Clinic Collect  -     Urine Culture Aerobic Bacterial - lab collect; Future  -     Urine Culture Aerobic Bacterial - lab collect      Reviewed with patient has no UTI noted in the wet prep was within normal limits other than some white blood cells will wait for the urine culture result and if her symptoms are back she should follow for further evaluation and treatment in the meantime should continue pushing enough fluids follow-up if notices any new symptoms.  Tylenol, Fluids and Rest  UA and wet prep were within normal limits UC is pending  I have reviewed the nursing notes.    Differential Diagnosis:  UTI: UTI, Dysuria, Pyelonephritis, Kidney Stone, Urethritis and Vaginitis    Review of the result(s) of each unique test     Time  spent on the date of the encounter doing chart review, review of test results, interpretation of tests, patient visit and documentation     If not improving or if conditions worsens over the next 12-24 hours, go to the Emergency Department    see orders in Epic  Pt verbalized and agreed with the plan and is aware of the worsening symptoms for which would need to follow up .  Pt was stable during time of discharge from the clinic     SUBJECTIVE     Hafsa Corona is a 66 year old female presenting with a chief complaint of    Chief Complaint   Patient presents with     Urgent Care     UTI     concern of UTI. Saturday at midnight had uti symptoms, urgency, pain, frequency and blood in  urine. Has taken 2 macrobid in past 2 days.      UTI    Onset of symptoms was 3day(s).  Course of illness is worsening  Severity moderate  Current and associated symptoms dysuria and frequency  Treatment and measures tried Antibiotics did take macrobid for 2 days   Predisposing factors include none  Patient denies rigors, flank pain, temperature > 101 degrees F. and vomiting                Past Medical History:   Diagnosis Date     Anxiety state, unspecified     [id number]     Arthritis 2014    vague, gradual, not treated really, knees feel it     At high risk for breast cancer 09/27/2019    30.3% lifetime risk by SHERON model     Attention deficit disorder without mention of hyperactivity      Cancer (H) 7/2017 and 1/2018    ovarian and kidney cancers, both treated well     Chronic rhinitis      Depressive disorder lifetime    plus Anxiety plus ADD. Escitalipram, therapy, ADD meds maybe     Depressive disorder, not elsewhere classified      Heart disease 1999    tachycardia and high cholesterol, managed     History of blood transfusion 7/2017    while in hospital for ovarian cancer     Hypertension 1999    tho BP was too LOW last week. past 12 years Generally OK     Panic disorder without agoraphobia      Pure hypercholesterolemia     Lipitor     Tachycardia      Tachycardia, unspecified     9/1999-2/2004     Type II or unspecified type diabetes mellitus without mention of complication, not stated as uncontrolled     diagnosed 2004     Current Outpatient Medications   Medication Sig Dispense Refill     atorvastatin (LIPITOR) 80 MG tablet TAKE 1 TABLET(80 MG) BY MOUTH DAILY 90 tablet 3     escitalopram (LEXAPRO) 20 MG tablet TAKE 1 TABLET(20 MG) BY MOUTH DAILY 90 tablet 1     fluticasone (FLONASE) 50 MCG/ACT nasal spray SHAKE LIQUID AND USE 2 SPRAYS IN EACH NOSTRIL DAILY 48 g 3     metFORMIN (GLUCOPHAGE-XR) 500 MG 24 hr tablet TAKE 2 TABLETS BY MOUTH EVERY DAY WITH THE EVENING MEAL 180 tablet 1     nitroFURantoin  macrocrystal-monohydrate (MACROBID) 100 MG capsule TAKE 1 CAPSULE BY MOUTH AFTER INTERCOURSE AS NEEDED 30 capsule PRN     rivaroxaban ANTICOAGULANT (XARELTO) 20 MG TABS tablet Take 1 tablet (20 mg) by mouth daily (with dinner) 90 tablet 4     tamoxifen (NOLVADEX) 10 MG tablet Cut each pill in 1/2 with pill cutter to ensure 5 mg daily dose. 45 tablet 3     traZODone (DESYREL) 50 MG tablet TAKE 1 TABLET  BY MOUTH EVERY NIGHT AS NEEDED FOR SLEEP 180 tablet PRN     Acetaminophen (TYLENOL PO) Take 500 mg by mouth 2 tabs prn pain (monthly)       B Complex Vitamins (VITAMIN B-COMPLEX PO) Take 50 mg by mouth        CALCIUM-MAGNESIUM-VITAMIN D PO Take 2 tablets by mouth daily       cephALEXin (KEFLEX) 500 MG capsule Take 1 capsule (500 mg) by mouth 3 times daily 21 capsule 0     Cholecalciferol (VITAMIN D) 1000 UNIT capsule Take 2,000 Units by mouth daily        DiphenhydrAMINE HCl (BENADRYL PO) Take 50 mg by mouth daily as needed (monthly)       EPINEPHrine (EPIPEN/ADRENACLICK/OR ANY BX GENERIC EQUIV) 0.3 MG/0.3ML injection 2-pack Inject 0.3 mLs (0.3 mg) into the muscle once as needed for anaphylaxis 0.3 mL PRN     fluconazole (DIFLUCAN) 150 MG tablet Take 1 tablet (150 mg) by mouth daily Take additional tablet 72 hours after first if symptoms persist. 2 tablet 1     LORazepam (ATIVAN) 1 MG tablet Take 1 tablet (1 mg) by mouth every 6 hours as needed (nausea/vomiting, anxiety or sleep) 30 tablet 1     Magnesium Hydroxide (MAGNESIA PO) Take 500 mg by mouth       Multiple Vitamins-Minerals (MULTIVITAMIN ADULT PO) Take 1 tablet by mouth daily       nystatin (MYCOSTATIN) 914427 UNIT/GM external powder Apply topically 2 times daily as needed Apply to folds under breast and abdomen. 60 g 1     Probiotic Product (PRO-BIOTIC BLEND PO) Take 1 capsule by mouth daily Enzymatic Therapy-probiotic pearls       tamoxifen (NOLVADEX) 10 MG tablet Take 0.5 tablets (5 mg) by mouth daily Cut each pill in 1/2 with pill cutter to ensure 5 mg  daily dose. 45 tablet 1     Social History     Tobacco Use     Smoking status: Never Smoker     Smokeless tobacco: Never Used   Substance Use Topics     Alcohol use: Yes     Comment: Very infrequent, a bit of wine or beer 2x per month maybe     Family History   Problem Relation Age of Onset     C.A.D. Father      Diabetes Father      Hypertension Father      Cerebrovascular Disease Father         1984 or      Psychotic Disorder Father      Pancreatic Cancer Father      Coronary Artery Disease Father      Hyperlipidemia Father         treated w statins     Other Cancer Father         pancreatic, ,  of this     Depression Father      Mental Illness Father         likely PTSD and depression     Obesity Father      C.A.D. Mother      Hypertension Mother      Breast Cancer Mother      Psychotic Disorder Mother      Colon Cancer Mother      Cancer Mother         Bone cancer     Coronary Artery Disease Mother      Hyperlipidemia Mother         treated w. statins     Other Cancer Mother         bone/marrow, ,  of this     Depression Mother      Anxiety Disorder Mother      Mental Illness Mother         various undiagnosed types, but THERE     Obesity Mother      Prostate Problems Brother         cleared      Hypertension Brother      Hyperlipidemia Brother         unsure if treated w statins     Depression Brother      Anxiety Disorder Brother      Mental Illness Brother         undiagnosed but THERE     Obesity Brother      Psychotic Disorder Sister         x2     Neurologic Disorder Sister      Hypertension Sister      Hyperlipidemia Sister         treated w statins     Depression Sister      Anxiety Disorder Sister      Obesity Sister      Psychotic Disorder Brother         x2     Depression Brother         major depressive disorder and OCD     Anxiety Disorder Brother      Mental Illness Brother         not sure of diagnoses, treated     Hypertension Brother      Hyperlipidemia Brother       "Psychotic Disorder Maternal Grandmother         ?     Coronary Artery Disease Maternal Grandmother          of heart attack age 84?     Depression Maternal Grandmother      Psychotic Disorder Maternal Grandfather         ?     Psychotic Disorder Paternal Grandmother         Schizophernia     Coronary Artery Disease Paternal Grandmother          of heart attack, age 85?     Depression Paternal Grandmother         severe, but recovered     Mental Illness Paternal Grandmother         possible bipolar or other     Psychotic Disorder Paternal Grandfather         ?     Respiratory Paternal Grandfather          of emphysema and smoking     Psychotic Disorder Sister      Other - See Comments Sister         small kidney stone      Hypertension Sister      Hyperlipidemia Sister         treated w statins     Depression Sister      Anxiety Disorder Sister      Cancer - colorectal No family hx of      Glaucoma No family hx of      Macular Degeneration No family hx of      Bone Cancer Mother      Other - See Comments Mother         psychotic disease      Other - See Comments Father         cerebrovascular disease, psychotic disease      Other - See Comments Sister         psychotic disease      Prostate Cancer Brother      Schizophrenia Paternal Grandmother      Emphysema Paternal Grandfather      Depression Brother      Anxiety Disorder Brother      Other - See Comments Brother         psychotic disease          ROS:    10 point ROS of systems including Constitutional, Eyes, Respiratory, Cardiovascular, Gastroenterology,  Integumentary, Muscularskeletal, Psychiatric ,neurological were all negative except for pertinent positives noted in my HPI         OBJECTIVE:    /82   Pulse 90   Temp 98.6  F (37  C) (Oral)   Resp 16   Ht 1.6 m (5' 3\")   Wt 93 kg (205 lb)   LMP 2009   SpO2 97%   BMI 36.31 kg/m    GENERAL APPEARANCE: healthy, alert and no distress  EYES: EOMI,  PERRL, conjunctiva clear   mouth " without ulcers, erythema or lesions  RESP: lungs clear to auscultation - no rales, rhonchi or wheezes  CV: regular rates and rhythm, normal S1 S2, no murmur noted  No CVA tenderness noted  ABDOMEN:  soft, nontender, no HSM or masses and bowel sounds normal  SKIN: no suspicious lesions or rashes  PSYCH: mentation appears normal  Physical Exam      (Note was completed, in part, with [employer] voice-recognition software. Documentation reviewed, but some grammatical, spelling, and word errors may remain.)  Gilda Washburn MD on 7/27/2021 at 7:08 PM

## 2021-07-27 NOTE — TELEPHONE ENCOUNTER
Routing refill request to provider for review/approval because:  Drug not on the [facility] refill protocol         Last Written Prescription Date:  12/1/20  Last Fill Quantity: 30,  # refills: prn   Last office visit: 12/1/2020 with prescribing provider:     Future Office Visit:   Next 5 appointments (look out 90 days)    Oct 07, 2021  2:40 PM  (Arrive by 2:25 PM)  Return Visit with MARLON Perez CNP  [facility] ([facility] ) [address] [place] [id number] [phone]

## 2021-07-29 LAB — BACTERIA UR CULT: NO GROWTH

## 2021-08-02 ENCOUNTER — LAB (OUTPATIENT)
Dept: LAB | Facility: CLINIC | Age: 66
End: 2021-08-02
Attending: OBSTETRICS & GYNECOLOGY
Payer: COMMERCIAL

## 2021-08-02 PROCEDURE — 96523 IRRIG DRUG DELIVERY DEVICE: CPT

## 2021-08-02 PROCEDURE — 250N000011 HC RX IP 250 OP 636: Performed by: OBSTETRICS & GYNECOLOGY

## 2021-08-02 PROCEDURE — 36591 DRAW BLOOD OFF VENOUS DEVICE: CPT

## 2021-08-02 RX ORDER — HEPARIN SODIUM (PORCINE) LOCK FLUSH IV SOLN 100 UNIT/ML 100 UNIT/ML
5 SOLUTION INTRAVENOUS ONCE
Status: COMPLETED | OUTPATIENT
Start: 2021-08-02 | End: 2021-08-02

## 2021-08-02 RX ADMIN — Medication 5 ML: at 16:29

## 2021-08-02 NOTE — NURSING NOTE
Chief Complaint   Patient presents with     Port Flush     Port flushed in lab by RN.      Port accessed with 20 gauge 3/4 inch gripper needle by RN. Port flushed with saline, no blood flash or return; port heparin locked.  Pt tolerated well. IB sent to Iris Dalton about possible TPA at next visit.     Thelma Watts RN

## 2021-08-04 ENCOUNTER — OFFICE VISIT (OUTPATIENT)
Dept: FAMILY MEDICINE | Facility: CLINIC | Age: 66
End: 2021-08-04
Payer: COMMERCIAL

## 2021-08-04 VITALS
DIASTOLIC BLOOD PRESSURE: 78 MMHG | HEART RATE: 88 BPM | WEIGHT: 212 LBS | RESPIRATION RATE: 16 BRPM | SYSTOLIC BLOOD PRESSURE: 116 MMHG | TEMPERATURE: 98.4 F | BODY MASS INDEX: 37.55 KG/M2 | OXYGEN SATURATION: 98 %

## 2021-08-04 DIAGNOSIS — F33.0 MAJOR DEPRESSIVE DISORDER, RECURRENT EPISODE, MILD (H): ICD-10-CM

## 2021-08-04 DIAGNOSIS — G62.0 PERIPHERAL NEUROPATHY DUE TO CHEMOTHERAPY (H): ICD-10-CM

## 2021-08-04 DIAGNOSIS — D17.71 ANGIOMYOLIPOMA OF KIDNEY: ICD-10-CM

## 2021-08-04 DIAGNOSIS — E66.01 MORBID OBESITY (H): ICD-10-CM

## 2021-08-04 DIAGNOSIS — T45.1X5A PERIPHERAL NEUROPATHY DUE TO CHEMOTHERAPY (H): ICD-10-CM

## 2021-08-04 DIAGNOSIS — E11.9 TYPE 2 DIABETES MELLITUS WITHOUT COMPLICATION, WITHOUT LONG-TERM CURRENT USE OF INSULIN (H): ICD-10-CM

## 2021-08-04 DIAGNOSIS — R10.32 LLQ ABDOMINAL PAIN: Primary | ICD-10-CM

## 2021-08-04 DIAGNOSIS — N18.31 STAGE 3A CHRONIC KIDNEY DISEASE (H): ICD-10-CM

## 2021-08-04 LAB — HBA1C MFR BLD: 6.5 % (ref 0–5.6)

## 2021-08-04 PROCEDURE — 83036 HEMOGLOBIN GLYCOSYLATED A1C: CPT | Performed by: NURSE PRACTITIONER

## 2021-08-04 PROCEDURE — 36415 COLL VENOUS BLD VENIPUNCTURE: CPT | Performed by: NURSE PRACTITIONER

## 2021-08-04 PROCEDURE — 99215 OFFICE O/P EST HI 40 MIN: CPT | Performed by: NURSE PRACTITIONER

## 2021-08-04 NOTE — PROGRESS NOTES
"    Assessment & Plan     LLQ abdominal pain  Resolved  She will follow up with her urologist for a cystoscopy as previously recommended.      Angiomyolipoma of kidney  See above.     Type 2 diabetes mellitus without complication, without long-term current use of insulin (H)  At goal  - **A1C FUTURE anytime    BMI > 35  Continue to work on diet and exercise.     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    Weight as of this encounter: 96.2 kg (212 lb).   Weight management plan: Discussed healthy diet and exercise guidelines        No follow-ups on file.    Morelia Ayon NP  [facility]    Aiyana Pereyra is a 66 year old who presents for the following health issues     History of Present Illness       She eats 2-3 servings of fruits and vegetables daily.She consumes 1 sweetened beverage(s) daily.She exercises with enough effort to increase her heart rate 10 to 19 minutes per day.  She exercises with enough effort to increase her heart rate 4 days per week.   She is taking medications regularly.       ED/UC Followup:    Facility:  [facility]  Date of visit: 7/18/21, 7/27/21  Reason for visit: Abdominal pain, UTI  Current Status: better but still suspecting something going on     Notes from 7/18 ED visit:  Patient resents for evaluation of left lower quadrant abdominal pain.     Differential diagnosis includes diverticulitis, ureterolithiasis, pyelonephritis, diverticulitis, colitis, constipation, UTI, appendicitis, complication/metastasis from history of ovarian cancer, small bowel obstruction     On arrival, patient has normal vital signs.  " She overall appears well and nontoxic.  Her abdominal pain is actually resolved by the time of my exam.  Her abdomen is soft and nontender.  No CVA tenderness.      Labs notable for glucose 127, white blood cell count 8.5, sodium 142, potassium 3.8, creatinine 1.19.     CT abdomen pelvis without evidence of small bowel obstruction, diverticulitis, colitis or other acute pathology.     Urinalysis without signs of infection.     Patient has continued to remain pain-free while in the ED aside from a mild lingering pain in her left flank which she rates a 1 out of 10 and improving.  Exact cause of her symptoms is unclear at this time, could be related to mild indigestion given improvement with Pepto-Bismol.  Passed kidney stone possibility as well given trace amount of blood in the urine, however, no hydro on CT, and overall less likely given clinical presentation.     Patient structured to follow-up with primary care physician and continue OTC antacids as needed.  Discussed reasons to return to the emergency department.    UA and UC from urgent care visit on 7/27 was negative.     She has a history of renal carcinoma, just noticed the note from the nephrologist on her UA from 6/25, which had 5 rbcs.  He stated that he would get her scheduled for cystoscopy and CT scan, but no one ever contacted her.     7/24 - had acute UTI symptoms while she was in [place], started Macrobid the next day for 2 days.    She was continuing to have urgency up until a few days ago.         Review of Systems         Objective    /78   Pulse 88   Temp 98.4  F (36.9  C) (Tympanic)   Resp 16   Wt 96.2 kg (212 lb)   LMP 01/01/2009   SpO2 98%   BMI 37.55 kg/m    Body mass index is 37.55 kg/m .  Physical Exam   GENERAL: healthy, alert and no distress  PSYCH: mentation appears normal, affect normal/bright

## 2021-08-12 PROBLEM — N39.46 MIXED INCONTINENCE: Status: RESOLVED | Noted: 2021-04-27 | Resolved: 2021-08-12

## 2021-08-19 ENCOUNTER — THERAPY VISIT (OUTPATIENT)
Dept: PHYSICAL THERAPY | Facility: CLINIC | Age: 66
End: 2021-08-19
Payer: COMMERCIAL

## 2021-08-19 DIAGNOSIS — N39.46 MIXED INCONTINENCE: Primary | ICD-10-CM

## 2021-08-19 PROCEDURE — 97530 THERAPEUTIC ACTIVITIES: CPT | Mod: GP | Performed by: PHYSICAL THERAPIST

## 2021-09-08 ENCOUNTER — MYC MEDICAL ADVICE (OUTPATIENT)
Dept: FAMILY MEDICINE | Facility: CLINIC | Age: 66
End: 2021-09-08

## 2021-09-13 ENCOUNTER — LAB (OUTPATIENT)
Dept: LAB | Facility: CLINIC | Age: 66
End: 2021-09-13
Attending: OBSTETRICS & GYNECOLOGY
Payer: COMMERCIAL

## 2021-09-13 PROCEDURE — 250N000011 HC RX IP 250 OP 636: Performed by: OBSTETRICS & GYNECOLOGY

## 2021-09-13 PROCEDURE — 96523 IRRIG DRUG DELIVERY DEVICE: CPT

## 2021-09-13 RX ORDER — HEPARIN SODIUM (PORCINE) LOCK FLUSH IV SOLN 100 UNIT/ML 100 UNIT/ML
5 SOLUTION INTRAVENOUS ONCE
Status: COMPLETED | OUTPATIENT
Start: 2021-09-13 | End: 2021-09-13

## 2021-09-13 RX ADMIN — Medication 5 ML: at 16:55

## 2021-09-13 NOTE — NURSING NOTE
"Chief Complaint   Patient presents with     Port Flush     Port flushed by RN.     Port accessed with 20g 3/4\" gripper needle  by rn.  Port flushed with NS and heparin.  Pt tolerated well. No blood return observed.     Recommended to pt to schedule for TPA.    Zachery Medina RN  "

## 2021-09-17 ENCOUNTER — THERAPY VISIT (OUTPATIENT)
Dept: PHYSICAL THERAPY | Facility: CLINIC | Age: 66
End: 2021-09-17
Payer: COMMERCIAL

## 2021-09-17 DIAGNOSIS — R32 INCONTINENCE OF URINE: Primary | ICD-10-CM

## 2021-09-17 PROCEDURE — 97110 THERAPEUTIC EXERCISES: CPT | Mod: 95 | Performed by: PHYSICAL THERAPIST

## 2021-09-17 PROCEDURE — 97530 THERAPEUTIC ACTIVITIES: CPT | Mod: 95 | Performed by: PHYSICAL THERAPIST

## 2021-09-26 DIAGNOSIS — Z87.440 HISTORY OF RECURRENT URINARY TRACT INFECTION: ICD-10-CM

## 2021-09-28 RX ORDER — NITROFURANTOIN 25; 75 MG/1; MG/1
CAPSULE ORAL
Qty: 30 CAPSULE | Status: SHIPPED | OUTPATIENT
Start: 2021-09-28 | End: 2022-07-06

## 2021-09-28 RX ORDER — NITROFURANTOIN 25; 75 MG/1; MG/1
CAPSULE ORAL
Qty: 30 CAPSULE | OUTPATIENT
Start: 2021-09-28

## 2021-09-28 NOTE — TELEPHONE ENCOUNTER
Routing refill request to provider for review/approval because:  Drug not on the [facility] refill protocol     CHARLENE Flowers RN  [facility]

## 2021-09-29 ENCOUNTER — NURSE TRIAGE (OUTPATIENT)
Dept: NURSING | Facility: CLINIC | Age: 66
End: 2021-09-29

## 2021-09-29 NOTE — TELEPHONE ENCOUNTER
"Dermatology issue, has been seeing [facility]: Dermatology consultants. They are booked out a month: appointment has been scheduled for 10/28..   Patient believes she has a sebaceous cyst on her left lower back that has been developing for weeks. She can feel it with her hand. She had 1-2 previously on her upper back 20-30 yrs ago and they were removed.   She says that her cyst is on the surface of the skin. It is a tender to touch lump. Oval shaped, approximately 1 inch long and 1/2 in wide. Pain=\"when not touched=almost none, if touched =\"3-4\". She took Tylenol & Benadryl last night to help her sleep.  No fever noted. No drainage noted.   PCP: Dr MAGGIE Ayon @ [facility].  Patient would like to schedule an appointment with dermatology sooner than 10/28.    Triaged to a disposition of See Physician within 24 hrs. Discussed options of clinic or UC.     Patient would like this message forwarded to Dr Ayon for her advice. Please contact patient via her MyChart. Patient will also try and find out if there is another Dermatology clinic that would have an earlier appointment available.     Reason for Disposition    [1] Swelling is painful to touch AND [2] no fever    Additional Information    Negative: Sounds like a life-threatening emergency to the triager    Negative: Small growth, spot, bump, or pigmented area of skin (e.g., moles, skin tags, wart, melanoma, skin cancer)    Negative: Inguinal hernia previously diagnosed by a physician    Negative: Followed a skin injury    Negative: Follows an insect bite    Negative: Swelling of lymph node suspected    Negative: Swelling of vaccination site    Negative: Swelling of tongue    Negative: Swelling of lip    Negative: Swelling of eye    Negative: Swelling of entire face    Negative: Swelling of scrotum    Negative: Swelling of labia    Negative: Swelling of surgical incision    Negative: Swelling of ankle joint    Negative: Swelling of elbow joint    Negative: " Swelling of knee joint    Negative: Swelling with a skin rash    Negative: Patient sounds very sick or weak to the triager    Negative: SEVERE pain (e.g., excruciating)    Negative: [1] Swelling is painful to touch AND [2] fever    Negative: [1] Swelling is red AND [2] fever    Negative: [1] Swelling is red AND [2] size > 2 inches (5.0 cm) (Exception: itchy area of skin)    Negative: [1] Swelling of groin (inguinal area) AND [2] painful    Protocols used: SKIN LUMP OR LOCALIZED SWELLING-A-AH  COVID 19 Nurse Triage Plan/Patient Instructions    Please be aware that novel coronavirus (COVID-19) may be circulating in the community. If you develop symptoms such as fever, cough, or SOB or if you have concerns about the presence of another infection including coronavirus (COVID-19), please contact your health care provider or visit [email or web address].     Disposition/Instructions    In-Person Visit with provider recommended. Reference Visit Selection Guide.    Thank you for taking steps to prevent the spread of this virus.  o Limit your contact with others.  o Wear a simple mask to cover your cough.  o Wash your hands well and often.    Resources    [facility]: About COVID-19: [email or web address]    CDC: What to Do If You're Sick: www.cdc.gov/coronavirus/2019-ncov/about/steps-when-sick.html    CDC: Ending Home Isolation: www.cdc.gov/coronavirus/2019-ncov/hcp/disposition-in-home-patients.html     CDC: Caring for Someone: www.cdc.gov/coronavirus/2019-ncov/if-you-are-sick/care-for-someone.html     [facility]: Interim Guidance for Hospital Discharge to Home: www.health.[email or web address].[place]/diseases/coronavirus/hcp/hospdischarge.pdf    [facility] clinical trials (COVID-19 research studies): [email or web address]     Below are the COVID-19 hotlines at the [place] Department of Health ([facility]). Interpreters are available.   o For health questions: Call 372-809-9595 or 1-920.957.4659  (7 a.m. to 7 p.m.)  o For questions about schools and childcare: Call 126-015-7758 or 1-129.297.9511 (7 a.m. to 7 p.m.)

## 2021-09-30 ENCOUNTER — OFFICE VISIT (OUTPATIENT)
Dept: URGENT CARE | Facility: URGENT CARE | Age: 66
End: 2021-09-30
Payer: COMMERCIAL

## 2021-09-30 VITALS
BODY MASS INDEX: 36.32 KG/M2 | TEMPERATURE: 98.7 F | HEIGHT: 63 IN | SYSTOLIC BLOOD PRESSURE: 110 MMHG | OXYGEN SATURATION: 96 % | WEIGHT: 205 LBS | RESPIRATION RATE: 16 BRPM | HEART RATE: 91 BPM | DIASTOLIC BLOOD PRESSURE: 70 MMHG

## 2021-09-30 DIAGNOSIS — L08.9 INFECTED SEBACEOUS CYST: Primary | ICD-10-CM

## 2021-09-30 DIAGNOSIS — L72.3 INFECTED SEBACEOUS CYST: Primary | ICD-10-CM

## 2021-09-30 PROCEDURE — 99213 OFFICE O/P EST LOW 20 MIN: CPT | Performed by: STUDENT IN AN ORGANIZED HEALTH CARE EDUCATION/TRAINING PROGRAM

## 2021-09-30 RX ORDER — CEPHALEXIN 500 MG/1
500 CAPSULE ORAL EVERY 6 HOURS
Qty: 28 CAPSULE | Refills: 0 | Status: SHIPPED | OUTPATIENT
Start: 2021-09-30 | End: 2021-10-07

## 2021-09-30 ASSESSMENT — MIFFLIN-ST. JEOR: SCORE: 1434

## 2021-09-30 NOTE — PROGRESS NOTES
SUBJECTIVE:  Hafsa Corona is an 66 year old female who presents for concern for infected cyst on back.  Patient has a spot on her left lower back that she has been concerned is a sebaceous cyst for a while which has now become red and tender over the last couple of days.  She has no systemic symptoms such as fevers or chills.  She did set up an appointment with a dermatologist to be seen but was unable to get in with them until late October.  She is wondering if she has something needs antibiotics and needs to be drained.    PMH:   has a past medical history of Anxiety state, unspecified, Arthritis (2014), At high risk for breast cancer (09/27/2019), Attention deficit disorder without mention of hyperactivity, Cancer (H) (7/2017 and 1/2018), Chronic rhinitis, Depressive disorder (lifetime), Depressive disorder, not elsewhere classified, Heart disease (1999), History of blood transfusion (7/2017), Hypertension (1999), Panic disorder without agoraphobia, Pure hypercholesterolemia, Tachycardia, Tachycardia, unspecified, and Type II or unspecified type diabetes mellitus without mention of complication, not stated as uncontrolled.  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)     Pain in joint, multiple sites     ACP (advance care planning)     Solitary kidney, acquired     Encounter for follow-up surveillance of ovarian cancer      Personal history of DVT (deep vein thrombosis)     Abscess of left lower extremity     ADD (attention deficit disorder) without hyperactivity     Angiomyolipoma of kidney     Anxiety     Coronary atherosclerosis     Depressive disorder     Dyslipidemia     Dysthymic disorder     Generalized anxiety disorder     Heel pain     Lymphedema     Mild anemia     Normocytic anemia     Renal oncocytoma     Panic disorder with agoraphobia     At high risk for breast cancer     Chronic kidney disease, stage 3     Social History     Socioeconomic History     Marital status: Single     Spouse name: None     Number of children: 0     Years of education: None     Highest education level: None   Occupational History     Comment: [employer]   Tobacco Use     Smoking status: Never Smoker     Smokeless tobacco: Never Used   Substance and Sexual Activity     Alcohol use: Yes     Comment: Very infrequent, a bit of wine or beer 2x per month maybe     Drug use: Yes     Types: Marijuana     Comment: infrequent, for celebrations only, maybe 8x per year     Sexual activity: Yes     Partners: Male     Birth control/protection: None     Comment: long-time    Other Topics Concern     Parent/sibling w/ CABG, MI or angioplasty before 65F 55M? No   Social History Narrative     None     Social Determinants of Health     Financial Resource Strain:      Difficulty of Paying Living Expenses:    Food Insecurity:      Worried About Running Out of Food in the Last Year:      Ran Out of Food in the Last Year:    Transportation Needs:      Lack of Transportation (Medical):      Lack of Transportation (Non-Medical):    Physical Activity:      Days of Exercise per Week:      Minutes of Exercise per Session:    Stress:      Feeling of Stress :    Social Connections:      Frequency of Communication with Friends and Family:      Frequency of Social Gatherings with Friends and Family:      Attends Rastafari Services:      Active Member of  Clubs or Organizations:      Attends Club or Organization Meetings:      Marital Status:    Intimate Partner Violence:      Fear of Current or Ex-Partner:      Emotionally Abused:      Physically Abused:      Sexually Abused:      Family History   Problem Relation Age of Onset     C.A.D. Father      Diabetes Father      Hypertension Father      Cerebrovascular Disease Father         1984 or      Psychotic Disorder Father      Pancreatic Cancer Father      Coronary Artery Disease Father      Hyperlipidemia Father         treated w statins     Other Cancer Father         pancreatic, ,  of this     Depression Father      Mental Illness Father         likely PTSD and depression     Obesity Father      C.A.D. Mother      Hypertension Mother      Breast Cancer Mother      Psychotic Disorder Mother      Colon Cancer Mother      Cancer Mother         Bone cancer     Coronary Artery Disease Mother      Hyperlipidemia Mother         treated w. statins     Other Cancer Mother         bone/marrow, ,  of this     Depression Mother      Anxiety Disorder Mother      Mental Illness Mother         various undiagnosed types, but THERE     Obesity Mother      Prostate Problems Brother         cleared      Hypertension Brother      Hyperlipidemia Brother         unsure if treated w statins     Depression Brother      Anxiety Disorder Brother      Mental Illness Brother         undiagnosed but THERE     Obesity Brother      Psychotic Disorder Sister         x2     Neurologic Disorder Sister      Hypertension Sister      Hyperlipidemia Sister         treated w statins     Depression Sister      Anxiety Disorder Sister      Obesity Sister      Psychotic Disorder Brother         x2     Depression Brother         major depressive disorder and OCD     Anxiety Disorder Brother      Mental Illness Brother         not sure of diagnoses, treated     Hypertension Brother      Hyperlipidemia Brother      Psychotic Disorder  Maternal Grandmother         ?     Coronary Artery Disease Maternal Grandmother          of heart attack age 84?     Depression Maternal Grandmother      Psychotic Disorder Maternal Grandfather         ?     Psychotic Disorder Paternal Grandmother         Schizophernia     Coronary Artery Disease Paternal Grandmother          of heart attack, age 85?     Depression Paternal Grandmother         severe, but recovered     Mental Illness Paternal Grandmother         possible bipolar or other     Psychotic Disorder Paternal Grandfather         ?     Respiratory Paternal Grandfather          of emphysema and smoking     Psychotic Disorder Sister      Other - See Comments Sister         small kidney stone      Hypertension Sister      Hyperlipidemia Sister         treated w statins     Depression Sister      Anxiety Disorder Sister      Cancer - colorectal No family hx of      Glaucoma No family hx of      Macular Degeneration No family hx of      Bone Cancer Mother      Other - See Comments Mother         psychotic disease      Other - See Comments Father         cerebrovascular disease, psychotic disease      Other - See Comments Sister         psychotic disease      Prostate Cancer Brother      Schizophrenia Paternal Grandmother      Emphysema Paternal Grandfather      Depression Brother      Anxiety Disorder Brother      Other - See Comments Brother         psychotic disease        ALLERGIES:  Paclitaxel, Wasps [hornets], Yellow hornet venom [hornet venom], Yellow jacket venom [venomil honey bee], and Sulfa drugs    Current Outpatient Medications   Medication     Acetaminophen (TYLENOL PO)     atorvastatin (LIPITOR) 80 MG tablet     B Complex Vitamins (VITAMIN B-COMPLEX PO)     CALCIUM-MAGNESIUM-VITAMIN D PO     cephALEXin (KEFLEX) 500 MG capsule     Cholecalciferol (VITAMIN D) 1000 UNIT capsule     escitalopram (LEXAPRO) 20 MG tablet     fluticasone (FLONASE) 50 MCG/ACT nasal spray     Magnesium Hydroxide  "(MAGNESIA PO)     metFORMIN (GLUCOPHAGE-XR) 500 MG 24 hr tablet     Multiple Vitamins-Minerals (MULTIVITAMIN ADULT PO)     nitroFURantoin macrocrystal-monohydrate (MACROBID) 100 MG capsule     Probiotic Product (PRO-BIOTIC BLEND PO)     rivaroxaban ANTICOAGULANT (XARELTO) 20 MG TABS tablet     tamoxifen (NOLVADEX) 10 MG tablet     DiphenhydrAMINE HCl (BENADRYL PO)     EPINEPHrine (EPIPEN/ADRENACLICK/OR ANY BX GENERIC EQUIV) 0.3 MG/0.3ML injection 2-pack     fluconazole (DIFLUCAN) 150 MG tablet     LORazepam (ATIVAN) 1 MG tablet     nystatin (MYCOSTATIN) 176731 UNIT/GM external powder     traZODone (DESYREL) 50 MG tablet     No current facility-administered medications for this visit.         ROS:  ROS is done and is negative for general/constitutional, eye, ENT, Respiratory, cardiovascular, GI, , Skin, musculoskeletal except as noted elsewhere.  All other review of systems negative except as noted elsewhere.    OBJECTIVE:  /70   Pulse 91   Temp 98.7  F (37.1  C) (Oral)   Resp 16   Ht 1.6 m (5' 3\")   Wt 93 kg (205 lb)   LMP 01/01/2009   SpO2 96%   BMI 36.31 kg/m    GENERAL APPEARANCE: Alert, in no acute distress.  EYES: Conjunctivae clear.  EARS: External ears normal.  NOSE: Normal, no drainage.  OROPHARYNX: MMM.  NECK: Supple, symmetrical.  RESP: no increased effort.  CV: good capillary refill.  ABDOMEN: nondistended.  SKIN: No ulcers, lesions or rash except for a small area of erythema with a tender palpable mass of inflammation underneath a specific pore that is occluded on her left lower back.  There is not a specific fluctuant pocket that would be amenable to drainage.  MUSCULOSKELETAL: No gross deformities.  NEURO: No gross deficits, CN 2-12 grossly intact.    RESULTS  No results found for any visits on 09/30/21.  No results found for this or any previous visit (from the past 48 hour(s)).    ASSESSMENT/PLAN:  (L72.3,  L08.9) Infected sebaceous cyst  (primary encounter diagnosis)  Comment: " Patient's presentation is most consistent with locally infected sebaceous cyst.  I cannot find a specific fluctuant pocket to drain at this time and cannot easily express drainage.  At this time the most prudent course of action seems to be to treat with antibiotics and try to get the patient in with general surgery as soon as possible for evaluation and possible drainage versus cyst removal if this is indeed an infected sebaceous cyst.  No indication for emergency department evaluation at this time.  Plan: cephALEXin (KEFLEX) 500 MG capsule, Adult         General Surg Referral          PPE worn: Minified, goggles.    Options for treatment and follow-up care were reviewed with the patient and/or guardian. Hafsa Corona and/or guardian engaged in the decision making process and verbalized understanding of the options discussed and agreed with the final plan.    See [facility] for orders, medications, letters, patient instructions    Javier Anaya MD

## 2021-10-04 ENCOUNTER — LAB (OUTPATIENT)
Dept: LAB | Facility: CLINIC | Age: 66
End: 2021-10-04
Attending: OBSTETRICS & GYNECOLOGY
Payer: COMMERCIAL

## 2021-10-04 DIAGNOSIS — E11.9 TYPE 2 DIABETES MELLITUS WITHOUT COMPLICATION, WITHOUT LONG-TERM CURRENT USE OF INSULIN (H): ICD-10-CM

## 2021-10-04 DIAGNOSIS — C56.9 MALIGNANT NEOPLASM OF OVARY, UNSPECIFIED LATERALITY (H): ICD-10-CM

## 2021-10-04 LAB
ANION GAP SERPL CALCULATED.3IONS-SCNC: 7 MMOL/L (ref 3–14)
BUN SERPL-MCNC: 27 MG/DL (ref 7–30)
CALCIUM SERPL-MCNC: 8.9 MG/DL (ref 8.5–10.1)
CANCER AG125 SERPL-ACNC: 13 U/ML (ref 0–30)
CHLORIDE BLD-SCNC: 107 MMOL/L (ref 94–109)
CO2 SERPL-SCNC: 25 MMOL/L (ref 20–32)
CREAT SERPL-MCNC: 1.18 MG/DL (ref 0.52–1.04)
GFR SERPL CREATININE-BSD FRML MDRD: 48 ML/MIN/1.73M2
GLUCOSE BLD-MCNC: 138 MG/DL (ref 70–99)
POTASSIUM BLD-SCNC: 3.8 MMOL/L (ref 3.4–5.3)
SODIUM SERPL-SCNC: 139 MMOL/L (ref 133–144)

## 2021-10-04 PROCEDURE — 80048 BASIC METABOLIC PNL TOTAL CA: CPT

## 2021-10-04 PROCEDURE — 86304 IMMUNOASSAY TUMOR CA 125: CPT

## 2021-10-04 PROCEDURE — 36415 COLL VENOUS BLD VENIPUNCTURE: CPT

## 2021-10-04 PROCEDURE — 250N000011 HC RX IP 250 OP 636: Performed by: OBSTETRICS & GYNECOLOGY

## 2021-10-04 RX ORDER — HEPARIN SODIUM (PORCINE) LOCK FLUSH IV SOLN 100 UNIT/ML 100 UNIT/ML
5 SOLUTION INTRAVENOUS EVERY 8 HOURS PRN
Status: DISCONTINUED | OUTPATIENT
Start: 2021-10-04 | End: 2021-10-15 | Stop reason: HOSPADM

## 2021-10-04 RX ADMIN — Medication 5 ML: at 16:42

## 2021-10-05 NOTE — TELEPHONE ENCOUNTER
REFERRAL INFORMATION:    Referring Provider:  Dr. Jad Anaya     Referring Clinic:  [facility]     Reason for Visit/Diagnosis: Infected sebaceous cyst       FUTURE VISIT INFORMATION:    Appointment Date: 10/6/2021    Appointment Time: 9 AM      NOTES RECORD STATUS  DETAILS   OFFICE NOTE from Referring Provider Internal 9/30/2021 Office visit with Dr. Anaya     OFFICE NOTE from Other Specialists N/A    HOSPITAL DISCHARGE SUMMARY/ ED VISITS  N/A    OPERATIVE REPORT N/A    ENDOSCOPY (EGD)  N/A    PERTINENT LABS Internal    PATHOLOGY REPORTS (RELATED) N/A    IMAGING (CT, MRI, US, XR)  N/A

## 2021-10-06 ENCOUNTER — PRE VISIT (OUTPATIENT)
Dept: SURGERY | Facility: CLINIC | Age: 66
End: 2021-10-06

## 2021-10-06 ENCOUNTER — TELEPHONE (OUTPATIENT)
Dept: SURGERY | Facility: CLINIC | Age: 66
End: 2021-10-06

## 2021-10-06 ENCOUNTER — OFFICE VISIT (OUTPATIENT)
Dept: SURGERY | Facility: CLINIC | Age: 66
End: 2021-10-06
Payer: COMMERCIAL

## 2021-10-06 VITALS
HEIGHT: 63 IN | WEIGHT: 211.6 LBS | HEART RATE: 93 BPM | SYSTOLIC BLOOD PRESSURE: 120 MMHG | TEMPERATURE: 97.4 F | OXYGEN SATURATION: 96 % | DIASTOLIC BLOOD PRESSURE: 78 MMHG | BODY MASS INDEX: 37.49 KG/M2

## 2021-10-06 DIAGNOSIS — L08.9 INFECTED SEBACEOUS CYST: ICD-10-CM

## 2021-10-06 DIAGNOSIS — L72.3 INFECTED SEBACEOUS CYST: ICD-10-CM

## 2021-10-06 PROCEDURE — 87186 SC STD MICRODIL/AGAR DIL: CPT | Mod: 90 | Performed by: PATHOLOGY

## 2021-10-06 PROCEDURE — 87077 CULTURE AEROBIC IDENTIFY: CPT | Mod: 90 | Performed by: PATHOLOGY

## 2021-10-06 PROCEDURE — 87076 CULTURE ANAEROBE IDENT EACH: CPT | Mod: 90 | Performed by: PATHOLOGY

## 2021-10-06 PROCEDURE — 87070 CULTURE OTHR SPECIMN AEROBIC: CPT | Mod: 90 | Performed by: PATHOLOGY

## 2021-10-06 PROCEDURE — 11403 EXC TR-EXT B9+MARG 2.1-3CM: CPT | Performed by: SURGERY

## 2021-10-06 PROCEDURE — 87075 CULTR BACTERIA EXCEPT BLOOD: CPT | Mod: 90 | Performed by: PATHOLOGY

## 2021-10-06 ASSESSMENT — MIFFLIN-ST. JEOR: SCORE: 1463.94

## 2021-10-06 ASSESSMENT — PAIN SCALES - GENERAL: PAINLEVEL: MILD PAIN (3)

## 2021-10-06 NOTE — NURSING NOTE
"Chief Complaint   Patient presents with     Consult     Infected sebacious cyst.       Vitals:    10/06/21 0906   BP: 120/78   BP Location: Left arm   Patient Position: Sitting   Cuff Size: Adult Regular   Pulse: 93   Temp: 97.4  F (36.3  C)   TempSrc: Oral   SpO2: 96%   Weight: 96 kg (211 lb 9.6 oz)   Height: 1.6 m (5' 3\")       Body mass index is 37.48 kg/m .                          Rosie Kim, EMT    "

## 2021-10-06 NOTE — NURSING NOTE
The following medication was given:     MEDICATION:  Lidocaine 1% Soln  ROUTE: SQ  SITE: Lower back cyst  DOSE: 5mL  LOT #: [id number]  : [employer]  EXPIRATION DATE: 05/24  NDC#: 722109P   Was there drug waste? Yes  Amount of drug waste (mL): 15.  Reason for waste:  Single use vial  Multi-dose vial: No    Shawanda Connelly RN  October 6, 2021

## 2021-10-06 NOTE — NURSING NOTE
[facility] [address] [address]  [phone]  Dept: [phone]  ______________________________________________________________________________    Patient: Hafsa Corona   : 1955   MRN: [id number]   2021    INVASIVE PROCEDURE SAFETY CHECKLIST    Date: 10/6/2021   Procedure: incision and drainage of lower back cyst  Patient Name: Hafsa Corona  MRN: [id number]  YOB: 1955    Action: Complete sections as appropriate. Any discrepancy results in a HARD COPY until resolved.     PRE PROCEDURE:  Patient ID verified with 2 identifiers (name and  or MRN): Yes  Procedure and site verified with patient/designee (when able): Yes  Accurate consent documentation in medical record: Yes  H&P (or appropriate assessment) documented in medical record: NA  H&P must be up to 30 days prior to procedure and updates within 24 hours of procedure as applicable: NA  Relevant diagnostic and radiology test results appropriately labeled and displayed as applicable: NA  Blood products, implants, devices, and or special equipment available for the procedure as applicable: NA   Procedure site(s) marked with provider initials: Yes  Marking not required: Yes  Procedure does not require site marking    TIMEOUT:  Time-Out performed immediately prior to starting procedure, including verbal and active participation of all team members addressing the following:Yes  * Correct patient identify  * Confirmed that the correct side and site are marked  * An accurate procedure consent form  * Agreement on the procedure to be done  * Correct patient position  * Relevant images and results are properly labeled and appropriately displayed  * The need to administer antibiotics or fluids for irrigation purposes during the procedure as applicable   * Safety precautions based on patient history or medication use    DURING PROCEDURE: Verification of  correct person, site, and procedures any time the responsibility for care of the patient is transferred to another member of the care team.

## 2021-10-06 NOTE — PROGRESS NOTES
"I saw Hafsa Corona today in clinic for evaluation of an infected cyst on her back.      She has a history of ovarian cancer-found 4 years ago and treated at the . She is on xarelto for a DVT found at the time of her cancer diagnosis- she has remained on it due to concerns of recurrent clots and her personal preference.    She has had a painful enlarged bump on her back for several weeks. She went to urgent care last week and was told she had an infected sebaceous cyst.  She was started on ABX and referred here.  Since starting ABX she is feeling pretty good.  The site has not ruptured or drained.    She is eager to have the cyst excised.    PE:  ./78 (BP Location: Left arm, Patient Position: Sitting, Cuff Size: Adult Regular)   Pulse 93   Temp 97.4  F (36.3  C) (Oral)   Ht 1.6 m (5' 3\")   Wt 96 kg (211 lb 9.6 oz)   LMP 01/01/2009   SpO2 96%   BMI 37.48 kg/m      A+Ox3, NAD, pleasant  RRR  No resp distress or wheezing  Abd nondistended.    Along her left lower back there is a 3cm palpable mass with surrounding erythema. It is fluctuant and tender to touch. Consistent with a cyst that is infected.    I obtained informed consent- she had taken her blood thinner last night.  I prepped the site in sterile fashion and injected 5ml of 1%lidocaine over the site.  I made a 1.5inch incision into the fluctuant site with drainage of pus and sebum.  This was cultured The site was packed and pressure held.  Dressings applied.    A/P:  Infected cyst- drained.  She will hold her blood thinner tonight.  We will follow-up the cultures and she will continue her current ABX for now.    -Return to clinic Friday for wound check and packing removal  -Follow-up culture results  "

## 2021-10-06 NOTE — TELEPHONE ENCOUNTER
Patient evaluated in Urgent Care on 9/30/21.  TIMUR WoodN, RN  [facility]

## 2021-10-06 NOTE — TELEPHONE ENCOUNTER
[facility] Call Center    Phone Message    May a detailed message be left on voicemail: yes     Reason for Call: Other: Hafsa is calling in asking to get a message to Shawanda. She states that she has an appointment on 10/8 with Shawanda at 10a, but just realized that she has an appt for a mammogram scheduled to start at 9:45a and is wondering if there will be any issues with still making her appointment or if it needs to be a little later to accomodate for time. Please call back as soon as possible to discuss.     Action Taken: Message routed to:  [facility] ([facility] Gen Surg    Travel Screening: Not Applicable

## 2021-10-06 NOTE — LETTER
"10/6/2021       RE: Hafsa Corona  [address]  [place] [address]     Dear Colleague,    Thank you for referring your patient, Hafsa Corona, to the [facility] [place] [facility]. Please see a copy of my visit note below.    I saw Hafsa Corona today in clinic for evaluation of an infected cyst on her back.      She has a history of ovarian cancer-found 4 years ago and treated at the . She is on xarelto for a DVT found at the time of her cancer diagnosis- she has remained on it due to concerns of recurrent clots and her personal preference.    She has had a painful enlarged bump on her back for several weeks. She went to urgent care last week and was told she had an infected sebaceous cyst.  She was started on ABX and referred here.  Since starting ABX she is feeling pretty good.  The site has not ruptured or drained.    She is eager to have the cyst excised.    PE:  ./78 (BP Location: Left arm, Patient Position: Sitting, Cuff Size: Adult Regular)   Pulse 93   Temp 97.4  F (36.3  C) (Oral)   Ht 1.6 m (5' 3\")   Wt 96 kg (211 lb 9.6 oz)   LMP 01/01/2009   SpO2 96%   BMI 37.48 kg/m      A+Ox3, NAD, pleasant  RRR  No resp distress or wheezing  Abd nondistended.    Along her left lower back there is a 3cm palpable mass with surrounding erythema. It is fluctuant and tender to touch. Consistent with a cyst that is infected.    I obtained informed consent- she had taken her blood thinner last night.  I prepped the site in sterile fashion and injected 5ml of 1%lidocaine over the site.  I made a 1.5inch incision into the fluctuant site with drainage of pus and sebum.  This was cultured The site was packed and pressure held.  Dressings applied.    A/P:  Infected cyst- drained.  She will hold her blood thinner tonight.  We will follow-up the cultures and she will continue her current ABX for now.    -Return to " clinic Friday for wound check and packing removal  -Follow-up culture results      Again, thank you for allowing me to participate in the care of your patient.      Sincerely,    Norris Vela MD

## 2021-10-06 NOTE — PATIENT INSTRUCTIONS
You met with Dr. Norris Vela.      Today's visit instructions:    Dr. Vela performed an incision and drainage of your lower back cyst today. We have sent fluid in for culture and we will call you with those results. You can change the outer dressing if it becomes saturated. You have been sent home with these dressings.     Please return to the clinic for a nurse visit on Friday 10/8 at 10am.        If you have questions please contact Kaia RN or Shawanda RN during regular clinic hours, Monday through Friday 7:30 AM - 4:00 PM, or you can contact us via [employer] at anytime.       If you have urgent needs after-hours, weekends, or holidays please call the hospital at [phone] and ask to speak with our on-call General Surgery Team.    Appointment [phone], option #1   Nurse Advice (Kaia or Shawanda): [phone]   Surgery Scheduler ([place]): [phone]  Fax: [phone]

## 2021-10-06 NOTE — NURSING NOTE
General Surgery Teaching Documentation    Hafsa Corona is a 66 year old female  Relevant Diagnosis:  (L72.3,  L08.9) Infected sebaceous cyst  Plan: Anaerobic bacterial culture, Cyst Aerobic         Bacterial Culture Routine    Coming into clinic for nurse visit on 10/8/21      Teaching Topic: Incision and Drainage of Lower back cyst    Infection Prevention:  Patient demonstrates understanding of the following:  Surgical procedure site care taught Yes  Signs and symptoms of infection taught Yes  Wound care taught: Yes    Asks Questions: Yes  Eager to Learn: Yes  Cooperative: Yes  Receptive (willing/able to accept information): Yes      Patient demonstrates understanding of the following:  - Reason for the appointment, diagnosis and treatment plan: Yes  - Knowledge of proper use of medications and conditions for which they are ordered (with special attention to potential side effects or drug interactions): Yes  - Which situations necessitate calling provider and whom to contact: Yes      Person(s) involved in teaching: Patient    Proper use and care of site: Yes  Nutritional needs and diet plan: Eat and drink normally  Pain management techniques: Yes and Avoid Aspirin, Ibuprofen, and Aleve type products three (3) days before and after procedure, if possible. Do not take Xarelto tonight.     Supplies given to patient: Cover dressings    Teaching concerns were addressed.

## 2021-10-07 ENCOUNTER — ONCOLOGY VISIT (OUTPATIENT)
Dept: ONCOLOGY | Facility: CLINIC | Age: 66
End: 2021-10-07
Attending: OBSTETRICS & GYNECOLOGY
Payer: COMMERCIAL

## 2021-10-07 VITALS
BODY MASS INDEX: 37.43 KG/M2 | TEMPERATURE: 98.3 F | SYSTOLIC BLOOD PRESSURE: 123 MMHG | WEIGHT: 211.3 LBS | HEART RATE: 97 BPM | OXYGEN SATURATION: 95 % | DIASTOLIC BLOOD PRESSURE: 82 MMHG

## 2021-10-07 DIAGNOSIS — C56.2 OVARIAN CANCER, LEFT (H): Primary | ICD-10-CM

## 2021-10-07 DIAGNOSIS — Z45.2 ENCOUNTER FOR CARE RELATED TO PORT-A-CATH: ICD-10-CM

## 2021-10-07 DIAGNOSIS — Z23 NEED FOR PROPHYLACTIC VACCINATION AND INOCULATION AGAINST INFLUENZA: ICD-10-CM

## 2021-10-07 PROCEDURE — 250N000011 HC RX IP 250 OP 636: Performed by: COLON & RECTAL SURGERY

## 2021-10-07 PROCEDURE — G0008 ADMIN INFLUENZA VIRUS VAC: HCPCS | Performed by: COLON & RECTAL SURGERY

## 2021-10-07 PROCEDURE — 99213 OFFICE O/P EST LOW 20 MIN: CPT | Performed by: OBSTETRICS & GYNECOLOGY

## 2021-10-07 PROCEDURE — 90662 IIV NO PRSV INCREASED AG IM: CPT | Performed by: COLON & RECTAL SURGERY

## 2021-10-07 PROCEDURE — G0463 HOSPITAL OUTPT CLINIC VISIT: HCPCS

## 2021-10-07 PROCEDURE — G0463 HOSPITAL OUTPT CLINIC VISIT: HCPCS | Mod: 25

## 2021-10-07 RX ADMIN — INFLUENZA A VIRUS A/VICTORIA/2570/2019 IVR-215 (H1N1) ANTIGEN (FORMALDEHYDE INACTIVATED), INFLUENZA A VIRUS A/TASMANIA/503/2020 IVR-221 (H3N2) ANTIGEN (FORMALDEHYDE INACTIVATED), INFLUENZA B VIRUS B/PHUKET/3073/2013 ANTIGEN (FORMALDEHYDE INACTIVATED), AND INFLUENZA B VIRUS B/WASHINGTON/02/2019 ANTIGEN (FORMALDEHYDE INACTIVATED) 0.7 ML: 60; 60; 60; 60 INJECTION, SUSPENSION INTRAMUSCULAR at 15:31

## 2021-10-07 NOTE — NURSING NOTE
"Oncology Rooming Note    October 7, 2021 3:02 PM   Hafsa Corona is a 66 year old female who presents for:    Chief Complaint   Patient presents with     Oncology Clinic Visit     Ovarian cancer, left (H)     Initial Vitals: /82 (BP Location: Left arm, Patient Position: Sitting, Cuff Size: Adult Regular)   Pulse 97   Temp 98.3  F (36.8  C) (Oral)   Wt 95.8 kg (211 lb 4.8 oz)   LMP 01/01/2009   SpO2 95%   BMI 37.43 kg/m   Estimated body mass index is 37.43 kg/m  as calculated from the following:    Height as of 10/6/21: 1.6 m (5' 3\").    Weight as of this encounter: 95.8 kg (211 lb 4.8 oz). Body surface area is 2.06 meters squared.  Data Unavailable Comment: Data Unavailable   Patient's last menstrual period was 01/01/2009.  Allergies reviewed: Yes  Medications reviewed: Yes    Medications: Medication refills not needed today.  Pharmacy name entered into [facility]:    [id number] - [place], [place] - [address] AT [facility] [address]  [id number] - [place], [place] - [address] AT [facility] [address]  [facility] [place], [place] -  [facility] [phone]    Clinical concerns: New concerns: patient had cyst drained yesterday. Please discuss port that may have blockage. Needs TPA Altpase.       Emilia Mar LPN October 7, 2021 3:03 PM                "

## 2021-10-07 NOTE — LETTER
10/7/2021         RE: Hafsa Corona  [address]  [place] [address]        Dear Colleague,    Thank you for referring your patient, Hafsa Corona, to the [facility]. Please see a copy of my visit note below.                    Follow Up Notes on Referred Patient    Date: 10/7/2021       RE: Hafsa Corona  : 1955  CLIFF: 10/7/2021      Hafsa Corona is a 66 year old woman with a diagnosis of stage IC2 clear cell carcinoma of the ovary. She completed treatment with surgery and three cycles of chemotherapy on 10/16/17. She is here today for follow up and surveillance.    Oncology History:  The patient has had a recent episode of lower abdominal pain in early July.  She was subsequently sent to the emergency room and was found to have a large 9 cm complex ovarian mass. She was admitted to the hospital for pain control.  7/3/17:  1187  17: Exploratory laparotomy, total abdominal hysterectomy, left salpingo-oophorectomy, cancer staging including infracolic omentectomy, bilateral pelvic & para-aortic lymphadenectomy, peritoneal biopsies, evacuation of pelvic fluid by Dr. Cole.    FINAL DIAGNOSIS:   A. LEFT OVARY AND FALLOPIAN TUBE, LEFT SALPINGO-OOPHORECTOMY:   - Ovarian clear cell carcinoma   - Background of endometriosis   - Fallopian tube with no significant histologic abnormality.   B. UTERUS, HYSTERECTOMY:   -  Inactive endometrium   -  Myometrium with adenomyosis and leiomyoma.   -  Cervix with squamous metaplasia.   C. PERITONEUM, RIGHT PELVIS, BIOPSY:   - Fibroadipose tissue, negative for malignancy.   D. LYMPH NODES, RIGHT PELVIC, DISSECTION:   - Thirteen benign lymph nodes (0/13).   E. LYMPH NODES, LEFT PELVIC, DISSECTION:   - Seven benign lymph nodes (0/7).   F. PERITONEUM, LEFT PELVIS, BIOPSY:   - Fibroadipose tissue, negative for malignancy.   G. PERITONEUM, BLADDER, BIOPSY:   - Fibroadipose tissue  with acute and chronic inflammation, negative  for malignancy.   H. PERITONEUM, POSTERIOR CUL-DE-SAC, BIOPSY:   - Fibroadipose tissue, negative for malignancy.   I. PERITONEUM, LEFT PARACOLIC GUTTER, BIOPSY:   - Fibroadipose tissue, negative for malignancy.   J. LYMPH NODES, LEFT PARA-AORTIC, DISSECTION:   - Five benign lymph nodes (0/5).   K. LYMPH NODES, RIGHT PARA-AORTIC, DISSECTION:   - Two benign lymph nodes (0/2).   L. PERITONEUM, RIGHT PARACOLIC GUTTER, BIOPSY:   - Fibroadipose tissue, negative for malignancy.   M. OMENTUM, OMENTECTOMY:   - Adipose tissue with reactive changes, negative for malignancy.   COMMENT:   The final diagnosis confirms the interpretation provided intraoperatively.   Report Name: Ovary or Fallopian Tube   Status: Submitted   Part(s) Involved:   A: Ovary and fallopian tube, left   Synoptic Report:   CLINICAL   Clinical History:   - Pelvic mass   SPECIMEN   Procedure:   - Left salpingo-oophorectomy   - Hysterectomy   - Omentectomy   - Peritoneal  biopsies   Lymph Node Sampling:   - Performed   Location:   - Pelvic lymph nodes   - Para-aortic lymph nodes   Specimen Integrity:   - Left ovary   Specimen Integrity of Left Ovary:   - Capsule intact   TUMOR   Primary Tumor Site(s):   - Left ovary   Left Ovary   Tumor Size of Left Ovary: 19 cm   Histologic Type:   - Clear cell carcinoma   Tumor Extent   Ovarian Surface Involvement:   - Absent   Specimen(s)   Extent of Left Ovary:   - Involved   Extent of Left Fallopian Tube:   - Not involved   Extent of Omentum:   - Not involved   Extent of Uterus:   - Not involved   Extent of Peritoneum:   - Not involved   Peritoneal Ascitic Fluid:   - Not performed / unknown   Pleural Fluid:   - Not performed / unknown   LYMPH NODES     Number of Pelvic Lymph Nodes Examined: 20     Number of Pelvic Lymph Nodes Involved: None identified     Number of Para-aortic Lymph Nodes Examined: 7     Number of Para-Aortic Lymph Nodes Involved: None identified   STAGE (PTNM, AJCC 7TH ED.)   Primary Tumor (pT):   -  Ovary   Ovarian Primary Tumor (pT):   - pT1a: Tumor limited to 1 ovary; capsule intact, no tumor on ovarian surface. No malignant cells in ascites or peritoneal washings#   Regional Lymph Nodes (pN):   - pN0: No regional lymph node metastasis       07/31/17: Dr Shaffer follow up: Discussed with the patient that given the high risk histology, as well as ruptured mass before surgery, she would be qualified at higher risk of recurrence despite early stage ovarian cancer.  Recommended adjuvant chemotherapy. Plan for carboplatin of AUC of 6 and paclitaxel of 175, m2 for 3 cycles. Referral for genetic counselor and will see her back after those 3 cycles  08/03/17: Call from patient stating she is going for a second opinion and would like chemotherapy rescheduled to week of 8/14/17.      08/16/17: Cycle #1 Carbo/Taxol.  73. Significant paclitaxel reaction.  08/30/17: C1 carboplatin/inpatient paclitaxel desensitization.   09/25/17: C2 carboplatin/paclitaxel- inpatient paclitaxel desensitization.  15.  10/16/17: C3 carboplatin/paclitaxel- switched to docetaxel given her neuropathy.  10.  11/14/17:  8. CT CAP:  IMPRESSION:   1. Post surgical changes of hysterectomy and bilateral  salpingo-oophorectomy. Nodular soft tissue density in the posterior  cul-de-sac along with ill-defined nodular thickening in the left  pelvis, suspicious for residual disease or metastatic deposits.   2. There is a 3 cm mass in the superior pole of the right kidney,  possibly extending into the renal hilum. Represents RCC until proven  otherwise. Consider renal mass protocol CT to assess renal vein  involvement.  3. Stable sub-4 mm pulmonary nodules, continued attention on  follow-up.     11/16/17: CT renal mass protocol  IMPRESSION:  1. Arterially enhancing mass measuring 3.6 x 2.1 x 2.2 cm mass arising  from superior posterior of the right kidney, this is renal cell  carcinoma until proven otherwise.  2. Stable  fat-containing umbilical hernia.     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 Margins negative. Three month surveillance plan with [facility].     2/26/18:  14     5/22/18:  11      8/23/18:  11     12/12/18:  10     3/8/19:  12     6/19/19:  13     9/23/19:  12    11/8/19: CT Chest:   IMPRESSION:   1. Stable, sub-4 mm pulmonary nodules as above.  2. Stable hypodense, subcentimeter nodules in the thyroid.     3/27/20:  14    10/1/20:  15    3/29/21:  10    7/18/21 CT A/P for abdominal pain in ER: IMPRESSION:   1.  No acute process in the abdomen or pelvis.  2.  Colonic diverticulosis without evidence for diverticulitis.  3.  Post right nephrectomy, hysterectomy, and oophorectomy    10/5/2021:  13        Today Hafsa comes to the clinic without any gynecological concerns. Pelvic floor PT ongoing, reports urinary incontinence has improved some. Portacath has been clogged for the last four weeks and she would like port removed now. Recently had a lower back cyst opened with general surgery. She is sexually active with her boyfriend. She is on Xarelto daily for hx of DVT. She denies any vaginal bleeding, no changes in her bowel or bladder habits, no nausea/emesis, and no difficulties eating. She denies any abdominal discomfort/bloating, no fevers or chills, and no chest pain or shortness of breath. She up to date on breast screening (high risk screening through [facility]).  She is up to date on her colonoscopy (1/6/20). She has had both COVID vaccines. One episode of abdominal pain in July which she was evaluated for in ER, CT negative. No abdominal pain since ER visit.            ROS: 10 point ROS neg other than the symptoms noted above in the HPI.      Past Medical History:    Past Medical History:   Diagnosis Date     Anxiety state, unspecified     [id number]     Arthritis 2014    vague, gradual, not treated really,  knees feel it     At high risk for breast cancer 09/27/2019    30.3% lifetime risk by SHERON model     Attention deficit disorder without mention of hyperactivity      Cancer (H) 7/2017 and 1/2018    ovarian and kidney cancers, both treated well     Chronic rhinitis      Depressive disorder lifetime    plus Anxiety plus ADD. Escitalipram, therapy, ADD meds maybe     Depressive disorder, not elsewhere classified      Heart disease 1999    tachycardia and high cholesterol, managed     History of blood transfusion 7/2017    while in hospital for ovarian cancer     Hypertension 1999    tho BP was too LOW last week. past 12 years Generally OK     Panic disorder without agoraphobia      Pure hypercholesterolemia     Lipitor     Tachycardia      Tachycardia, unspecified     9/1999-2/2004     Type II or unspecified type diabetes mellitus without mention of complication, not stated as uncontrolled     diagnosed 2004         Past Surgical History:    Past Surgical History:   Procedure Laterality Date     ABSCESS DRAINAGE Left     left leg      AS REMV KIDNEY,RADICAL Right      BIOPSY  7/2017 and 2/2018    ovarian and kidney cancer biopsies. also 1987 breast benign     BREAST CYST EXCISION  1985     BREAST SURGERY  1987    date unsure. benign breast cyst removed     CATARACT IOL, RT/LT Left 05/18/2018     CATARACT IOL, RT/LT Right 07/1318     COLONOSCOPY  2008 and 2013    polyps removed. Next due fall 2018     ENDOMETRIAL ABLATION  2008     HYSTERECTOMY TOTAL ABDOMINAL, BILATERAL SALPINGO-OOPHORECTOMY, NODE DISSECTION, COMBINED Left 7/7/2017    Procedure: COMBINED HYSTERECTOMY TOTAL ABDOMINAL, SALPINGO-OOPHORECTOMY, NODE DISSECTION;  Exploratory Laparotomy, Left Salpingo-Oophorectomy, Cancer Staging, Total Hysterectomy, Omentectomy, Evacuation of abdominal fluid, Lymph Node Dissection  Anesthesia Block ;  Surgeon: Serena Cole MD;  Location: [facility] OR     HYSTERECTOMY, PAP NO LONGER INDICATED  07/07/2017    Laparotomy; for  ovarian cancer staging     HYSTERECTOMY, PAP NO LONGER INDICATED       INSERT PORT VASCULAR ACCESS Right 8/21/2017    Procedure: INSERT PORT VASCULAR ACCESS;  Single Lumen Chest Power Port;  Surgeon: Stephen Mike PA-C;  Location: [facility] OR     LYMPHADENECTOMY RETROPERITONEAL Bilateral 07/07/2017    Laparotomy; pelvics & para-aortics; for ovarian cancer staging     OMENTECTOMY  07/07/2017    Laparotomy; for ovarian cancer staging     ORTHOPEDIC SURGERY  1/2002    broken left jaw due to fall, 4 fractures, titanium plates     OTHER SURGICAL HISTORY  01/2002    OTHER SURGICAL HISTORYfacial surgery due to fall      PHACOEMULSIFICATION CLEAR CORNEA WITH STANDARD INTRAOCULAR LENS IMPLANT Right 7/13/2018    Procedure: PHACOEMULSIFICATION CLEAR CORNEA WITH STANDARD INTRAOCULAR LENS IMPLANT;  RIght Eye Phacoemulsification Clear Cornea with Standard Intraocular Lens Placement ;  Surgeon: Mary Jo Massey MD;  Location: [facility] OR     PHACOEMULSIFICATION CLEAR CORNEA WITH TORIC INTRAOCULAR LENS IMPLANT Left 5/18/2018    Procedure: PHACOEMULSIFICATION CLEAR CORNEA WITH TORIC INTRAOCULAR LENS IMPLANT;  Left Eye Phacoemulsification with Toric Lens;  Surgeon: Mary Jo Massey MD;  Location: [facility] OR     SALPINGO OOPHORECTOMY,R/L/KAYY Right 2008    Salpingo Oophorectomy, RT     SALPINGO OOPHORECTOMY,R/L/KAYY Left 07/07/2017    Laparotomy; for ovarian cancer staging     SALPINGOOPHORECTOMY  2008     SURGICAL HISTORY OF -       facial surgery d/t fall in 1/2002     SURGICAL HISTORY OF -       1985 removal of breast cyst     SURGICAL HISTORY OF -   2008    endometrial ablation     YAG CAPSULOTOMY OD (RIGHT EYE)  10/22/2018         Health Maintenance Due   Topic Date Due     ZOSTER IMMUNIZATION (1 of 2) Never done     EYE EXAM  11/28/2019     DIABETIC FOOT EXAM  10/01/2020     INFLUENZA VACCINE (1) 09/01/2021       Current Medications:     Current Outpatient Medications   Medication Sig Dispense Refill     Acetaminophen (TYLENOL PO) Take  500 mg by mouth 2 tabs prn pain (monthly)       atorvastatin (LIPITOR) 80 MG tablet TAKE 1 TABLET(80 MG) BY MOUTH DAILY 90 tablet 3     B Complex Vitamins (VITAMIN B-COMPLEX PO) Take 50 mg by mouth        CALCIUM-MAGNESIUM-VITAMIN D PO Take 2 tablets by mouth daily       cephALEXin (KEFLEX) 500 MG capsule Take 1 capsule (500 mg) by mouth every 6 hours for 7 days 28 capsule 0     Cholecalciferol (VITAMIN D) 1000 UNIT capsule Take 2,000 Units by mouth daily        DiphenhydrAMINE HCl (BENADRYL PO) Take 50 mg by mouth daily as needed (monthly)       EPINEPHrine (EPIPEN/ADRENACLICK/OR ANY BX GENERIC EQUIV) 0.3 MG/0.3ML injection 2-pack Inject 0.3 mLs (0.3 mg) into the muscle once as needed for anaphylaxis 0.3 mL PRN     escitalopram (LEXAPRO) 20 MG tablet TAKE 1 TABLET(20 MG) BY MOUTH DAILY 90 tablet 1     fluticasone (FLONASE) 50 MCG/ACT nasal spray SHAKE LIQUID AND USE 2 SPRAYS IN EACH NOSTRIL DAILY 48 g 3     LORazepam (ATIVAN) 1 MG tablet Take 1 tablet (1 mg) by mouth every 6 hours as needed (nausea/vomiting, anxiety or sleep) 30 tablet 1     Magnesium Hydroxide (MAGNESIA PO) Take 500 mg by mouth       metFORMIN (GLUCOPHAGE-XR) 500 MG 24 hr tablet TAKE 2 TABLETS BY MOUTH EVERY DAY WITH THE EVENING MEAL 180 tablet 1     Multiple Vitamins-Minerals (MULTIVITAMIN ADULT PO) Take 1 tablet by mouth daily       nitroFURantoin macrocrystal-monohydrate (MACROBID) 100 MG capsule TAKE 1 CAPSULE BY MOUTH AFTER INTERCOURSE AS NEEDED 30 capsule PRN     nystatin (MYCOSTATIN) 654855 UNIT/GM external powder Apply topically 2 times daily as needed Apply to folds under breast and abdomen. 60 g 1     Probiotic Product (PRO-BIOTIC BLEND PO) Take 1 capsule by mouth daily Enzymatic Therapy-probiotic pearls       rivaroxaban ANTICOAGULANT (XARELTO) 20 MG TABS tablet Take 1 tablet (20 mg) by mouth daily (with dinner) 90 tablet 4     tamoxifen (NOLVADEX) 10 MG tablet Cut each pill in 1/2 with pill cutter to ensure 5 mg daily dose. 45 tablet 3      traZODone (DESYREL) 50 MG tablet TAKE 1 TABLET  BY MOUTH EVERY NIGHT AS NEEDED FOR SLEEP 180 tablet PRN     fluconazole (DIFLUCAN) 150 MG tablet Take 1 tablet (150 mg) by mouth daily Take additional tablet 72 hours after first if symptoms persist. (Patient not taking: Reported on 2021) 2 tablet 1         Allergies:        Allergies   Allergen Reactions     Paclitaxel Anaphylaxis and Difficulty breathing     Wasps [Hornets] Anaphylaxis     Yellow Hornet Venom [Hornet Venom] Anaphylaxis and Swelling     Yellow Jacket Venom [Venomil Honey Bee] Anaphylaxis and Swelling     Sulfa Drugs Hives and Rash        Social History:     Social History     Tobacco Use     Smoking status: Never Smoker     Smokeless tobacco: Never Used   Substance Use Topics     Alcohol use: Yes     Comment: Very infrequent, a bit of wine or beer 2x per month maybe       History   Drug Use     Types: Marijuana     Comment: infrequent, for celebrations only, maybe 8x per year         Family History:     The patient's family history is notable for:     Family History   Problem Relation Age of Onset     C.A.D. Father      Diabetes Father      Hypertension Father      Cerebrovascular Disease Father         1984 or      Psychotic Disorder Father      Pancreatic Cancer Father      Coronary Artery Disease Father      Hyperlipidemia Father         treated w statins     Other Cancer Father         pancreatic, ,  of this     Depression Father      Mental Illness Father         likely PTSD and depression     Obesity Father      C.A.D. Mother      Hypertension Mother      Breast Cancer Mother      Psychotic Disorder Mother      Colon Cancer Mother      Cancer Mother         Bone cancer     Coronary Artery Disease Mother      Hyperlipidemia Mother         treated w. statins     Other Cancer Mother         bone/marrow, ,  of this     Depression Mother      Anxiety Disorder Mother      Mental Illness Mother         various  undiagnosed types, but THERE     Obesity Mother      Prostate Problems Brother         cleared      Hypertension Brother      Hyperlipidemia Brother         unsure if treated w statins     Depression Brother      Anxiety Disorder Brother      Mental Illness Brother         undiagnosed but THERE     Obesity Brother      Psychotic Disorder Sister         x2     Neurologic Disorder Sister      Hypertension Sister      Hyperlipidemia Sister         treated w statins     Depression Sister      Anxiety Disorder Sister      Obesity Sister      Psychotic Disorder Brother         x2     Depression Brother         major depressive disorder and OCD     Anxiety Disorder Brother      Mental Illness Brother         not sure of diagnoses, treated     Hypertension Brother      Hyperlipidemia Brother      Psychotic Disorder Maternal Grandmother         ?     Coronary Artery Disease Maternal Grandmother          of heart attack age 84?     Depression Maternal Grandmother      Psychotic Disorder Maternal Grandfather         ?     Psychotic Disorder Paternal Grandmother         Schizophernia     Coronary Artery Disease Paternal Grandmother          of heart attack, age 85?     Depression Paternal Grandmother         severe, but recovered     Mental Illness Paternal Grandmother         possible bipolar or other     Psychotic Disorder Paternal Grandfather         ?     Respiratory Paternal Grandfather          of emphysema and smoking     Psychotic Disorder Sister      Other - See Comments Sister         small kidney stone      Hypertension Sister      Hyperlipidemia Sister         treated w statins     Depression Sister      Anxiety Disorder Sister      Cancer - colorectal No family hx of      Glaucoma No family hx of      Macular Degeneration No family hx of      Bone Cancer Mother      Other - See Comments Mother         psychotic disease      Other - See Comments Father         cerebrovascular disease, psychotic disease       Other - See Comments Sister         psychotic disease      Prostate Cancer Brother      Schizophrenia Paternal Grandmother      Emphysema Paternal Grandfather      Depression Brother      Anxiety Disorder Brother      Other - See Comments Brother         psychotic disease          Physical Exam:     /82 (BP Location: Left arm, Patient Position: Sitting, Cuff Size: Adult Regular)   Pulse 97   Temp 98.3  F (36.8  C) (Oral)   Wt 95.8 kg (211 lb 4.8 oz)   LMP 01/01/2009   SpO2 95%   BMI 37.43 kg/m    Body mass index is 37.43 kg/m .    General Appearance: healthy and alert, no distress     HEENT: no thyromegaly, no palpable nodules or masses; left supraclavicular swelling known lipomas consistent with prior exams       Cardiovascular: regular rate and rhythm, no gallops, rubs or murmurs     Respiratory: lungs clear, no rales, rhonchi or wheezes    Musculoskeletal: extremities non tender without edema    Skin:  no lesions or rashes; lower left back with dressing applied     Neurological: normal gait, no gross defects     Psychiatric: appropriate mood and affect                               Hematological: normal cervical, supraclavicular and inguinal lymph nodes     Gastrointestinal:       abdomen soft, non-tender, non-distended, no organomegaly or masses    Genitourinary: External genitalia and urethral meatus appears normal. Vagina is smooth without nodularity or masses. Cervix surgically absent.  Bimanual exam reveal no masses, nodularity or fullness. Recto-vaginal exam confirms these findings.       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 She completed treatment with surgery and three cycles of chemotherapy on 10/16/17. She is here today for follow up and surveillance.    A total of 20 minutes was spent with the patient, over 50% minutes of which were spent in counseling the patient and/or treatment planning.      Plan:      1.)         ASHISH on exam. Continue surveillance every six months x3 years (through 10/2022) then annually thereafter with  and pelvic/rectal exam. Continue q6wk port flushes. Reviewed signs and symptoms for when she should contact the clinic or seek additional care. Patient to contact the clinic with any questions or concerns in the interim. Message sent to ALLYSON Mcadams to contact IR regarding portacath removal and Xarelto holding time frame.      2.) Genetic testing: Hafsa is negative for mutations in the AIP, ALK, APC, COSTA, BAP1, BARD1, BLM, BRCA1, BRCA2, BRIP1, BMPR1A, CDH1, CDK4, CDKN1B, CDKN2A, CHEK2, DICER1, EPCAM, FANCC, FH, FLCN, GALNT12, GREM1, HOXB13, MAX, MEN1, MET, MITF, MLH1, MRE11A, MSH2, MSH6, MUTYH, NBN, NF1, NF2, PALB2, PHOX2B, PMS2, POLD1, POLE, POT1, YOPAT2V, PTCH1, PTEN, RAD50, RAD51C, RAD51D, RB1, RET, SDHA, SDHAF2, SDHB, SDHC, SDHD, SMAD4, SMARCA4, SMARCB1, SMARCE1, STK11, SUFU, ODEK320, TP53, TSC1, TSC2, VHL, and XRCC2 genes. No mutations were found in any of the 67 genes analyzed. High risk breast screening. Sees [facility]. On low dose tamoxifen since 10/10/2019 for prevention of breast cancer.     3.) Labs and/or tests ordered include:   (reviewed) Portacath removal; flu shot      4.) Health maintenance issues addressed today include annual health maintenance and non-gynecologic issues with PCP.     5.)        Lung nodules: stable for two years on CT considered statistically benign and no further imaging needed unless new symptoms.     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 Mercado with Urology.Urinary incontinence: stress and urge. Undergoing pelvic floor PT and reports improvements.     MARLON Bradshaw, WHNP-BC  Women's Health Nurse Practitioner  Division of Gynecologic Oncology  [facility]    CC  Patient Care Team:  Morelia Ayon NP as PCP - Karla Perez RN as Continuity Care  Coordinator (Gynecologic Oncology)  Nabeel Dick MD as MD (Urology)  Javier Gregorio MD as MD (Urology)  Lola Vasquez, PhD LP as Psychologist (Psychology)  Morelia Ayon NP as Assigned PCP  Leland Mercado MD as MD (Urology)  Dayanna Clayton, RN as Registered Nurse (Oncology)  Norris Vela MD as MD (Critical Care)  Shawanda Connelly, RN as Registered Nurse

## 2021-10-07 NOTE — PROGRESS NOTES
Oncology Risk Management Consultation:  Date on this visit: 10/8/2021    Hafsa Corona  requires heightened screening and surveillance for her higher risk of breast cancer, secondary to a family history of breast cancer in her mother at age 63.     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     She is considered to at high risk for breast cancer and has a 30.3% lifetime risk for breast cancer by the SHERON model. She has a 10.8% 5 year risk by the SHERON model.      Primary Physician:  MARLON Reilly-CNP     History Of Present Illness:  Ms. Corona is a very pleasant  66 year old female who presents with family history of breast cancer.      Genetic testin2017 - Negative for mutations in the AIP, ALK, APC, COSTA, BAP1, BARD1, BLM, BRCA1, BRCA2, BRIP1, BMPR1A, CDH1, CDK4, CDKN1B, CDKN2A, CHEK2, DICER1, EPCAM, FANCC, FH, FLCN, GALNT12, GREM1, HOXB13, MAX, MEN1, MET, MITF, MLH1, MRE11A, MSH2, MSH6, MUTYH, NBN, NF1, NF2, PALB2, PHOX2B, PMS2, POLD1, POLE, POT1, VGBQP5U, PTCH1, PTEN, RAD50, RAD51C, RAD51D, RB1, RET, SDHA, SDHAF2, SDHB, SDHC, SDHD, SMAD4, SMARCA4, SMARCB1, SMARCE1, STK11, SUFU, ORCP005, TP53, TSC1, TSC2, VHL, and XRCC2 genes using a Cancer-Next Expanded panel through [employer].     Pertinent history:  2007- R salpingo oophorectomy for abnormal imaging (enlarged ovary), biopsy showed endometriosis  2017 - Stage 1C2 Clear cell carcinoma of the L ovary, s/p HELENE/LSO and chemotherapy.  18: R nephrectomy with Dr. 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   Nulliparous.  Menarche at 10.  Menopause: age 54  Hx of OCPs x6 years.  No hx of HRT.  Density: heterogeneously dense  No history of breast biopsy.  Hx of breast cyst in , aspirated   No history of hyperplasia, atypia, or malignancy  On low dose tamoxifen since 10/10/2019 for prevention of breast cancer     Pertinent screening history:  2011 - Screening  mammogram, normal by report   2/1/2014 - Screening mammogram, normal by report  9/15/2017 - Screening mammogram, BiRads1.    4/5/2018 - Breast MRI, BiRads2.  9/21/2018: Screening tomosynthesis mammogram, BI-RADS 1.  4/9/2019: Breast MRI, BI-RADS 1.  9/27/2019- Screening tomosynthesis mammogram, BiRads1  10/1/2020- Screening mammogram, BiRads1  3/29/2021- Breast MRI, BiRads1        At this visit, she denies asymmetry, lumps, masses, thickening, pain, nipple discharge and skin changes    Past Medical/Surgical History:  Past Medical History:   Diagnosis Date     Anxiety state, unspecified     [id number]     Arthritis 2014    vague, gradual, not treated really, knees feel it     At high risk for breast cancer 09/27/2019    30.3% lifetime risk by SHERON model     Attention deficit disorder without mention of hyperactivity      Cancer (H) 7/2017 and 1/2018    ovarian and kidney cancers, both treated well     Chronic rhinitis      Depressive disorder lifetime    plus Anxiety plus ADD. Escitalipram, therapy, ADD meds maybe     Heart disease 1999    tachycardia and high cholesterol, managed     History of blood transfusion 07/2017    while in hospital for ovarian cancer     Hypertension 1999    tho BP was too LOW last week. past 12 years Generally OK     Panic disorder without agoraphobia      Pure hypercholesterolemia     Lipitor     Tachycardia, unspecified     9/1999-2/2004     Type II or unspecified type diabetes mellitus without mention of complication, not stated as uncontrolled     diagnosed 2004     Past Surgical History:   Procedure Laterality Date     ABSCESS DRAINAGE Left     left leg      AS REMV KIDNEY,RADICAL Right      BIOPSY  7/2017 and 2/2018    ovarian and kidney cancer biopsies. also 1987 breast benign     BREAST CYST EXCISION  1985     BREAST SURGERY  1987    date unsure. benign breast cyst removed     CATARACT IOL, RT/LT Left 05/18/2018     CATARACT IOL, RT/LT Right 07/1318     COLONOSCOPY  2008 and 2013     polyps removed. Next due fall 2018     ENDOMETRIAL ABLATION  2008     HYSTERECTOMY TOTAL ABDOMINAL, BILATERAL SALPINGO-OOPHORECTOMY, NODE DISSECTION, COMBINED Left 7/7/2017    Procedure: COMBINED HYSTERECTOMY TOTAL ABDOMINAL, SALPINGO-OOPHORECTOMY, NODE DISSECTION;  Exploratory Laparotomy, Left Salpingo-Oophorectomy, Cancer Staging, Total Hysterectomy, Omentectomy, Evacuation of abdominal fluid, Lymph Node Dissection  Anesthesia Block ;  Surgeon: Serena Cole MD;  Location: [facility]     HYSTERECTOMY, PAP NO LONGER INDICATED  07/07/2017    Laparotomy; for ovarian cancer staging     HYSTERECTOMY, PAP NO LONGER INDICATED       INSERT PORT VASCULAR ACCESS Right 8/21/2017    Procedure: INSERT PORT VASCULAR ACCESS;  Single Lumen Chest Power Port;  Surgeon: Stephen Mike PA-C;  [facility]     LYMPHADENECTOMY RETROPERITONEAL Bilateral 07/07/2017    Laparotomy; pelvics & para-aortics; for ovarian cancer staging     OMENTECTOMY  07/07/2017    Laparotomy; for ovarian cancer staging     ORTHOPEDIC SURGERY  1/2002    broken left jaw due to fall, 4 fractures, titanium plates     OTHER SURGICAL HISTORY  01/2002    OTHER SURGICAL HISTORYfacial surgery due to fall      PHACOEMULSIFICATION CLEAR CORNEA WITH STANDARD INTRAOCULAR LENS IMPLANT Right 7/13/2018    Procedure: PHACOEMULSIFICATION CLEAR CORNEA WITH STANDARD INTRAOCULAR LENS IMPLANT;  RIght Eye Phacoemulsification Clear Cornea with Standard Intraocular Lens Placement ;  Surgeon: Mary Jo Massey MD;  [facility]     PHACOEMULSIFICATION CLEAR CORNEA WITH TORIC INTRAOCULAR LENS IMPLANT Left 5/18/2018    Procedure: PHACOEMULSIFICATION CLEAR CORNEA WITH TORIC INTRAOCULAR LENS IMPLANT;  Left Eye Phacoemulsification with Toric Lens;  Surgeon: Mary Jo Massey MD;  [facility]     SALPINGO OOPHORECTOMY,R/L/KAYY Right 2008    Salpingo Oophorectomy, RT     SALPINGO OOPHORECTOMY,R/L/KAYY Left 07/07/2017    Laparotomy; for ovarian cancer staging      SALPINGOOPHORECTOMY  2008     SURGICAL HISTORY OF -       facial surgery d/t fall in 1/2002     SURGICAL HISTORY OF -       1985 removal of breast cyst     SURGICAL HISTORY OF -   2008    endometrial ablation     YAG CAPSULOTOMY OD (RIGHT EYE)  10/22/2018       Allergies:  Allergies as of 10/08/2021 - Reviewed 10/08/2021   Allergen Reaction Noted     Paclitaxel Anaphylaxis and Difficulty breathing 08/30/2017     Wasps [hornets] Anaphylaxis 09/03/2009     Yellow hornet venom [hornet venom] Anaphylaxis and Swelling 05/10/2018     Yellow jacket venom [venomil honey bee] Anaphylaxis and Swelling 05/10/2018     Sulfa drugs Hives and Rash 07/19/2005       Current Medications:  Current Outpatient Medications   Medication Sig Dispense Refill     Acetaminophen (TYLENOL PO) Take 500 mg by mouth 2 tabs prn pain (monthly)       atorvastatin (LIPITOR) 80 MG tablet TAKE 1 TABLET(80 MG) BY MOUTH DAILY 90 tablet 3     B Complex Vitamins (VITAMIN B-COMPLEX PO) Take 50 mg by mouth        CALCIUM-MAGNESIUM-VITAMIN D PO Take 2 tablets by mouth daily       Cholecalciferol (VITAMIN D) 1000 UNIT capsule Take 2,000 Units by mouth daily        DiphenhydrAMINE HCl (BENADRYL PO) Take 50 mg by mouth daily as needed (monthly)       EPINEPHrine (EPIPEN/ADRENACLICK/OR ANY BX GENERIC EQUIV) 0.3 MG/0.3ML injection 2-pack Inject 0.3 mLs (0.3 mg) into the muscle once as needed for anaphylaxis 0.3 mL PRN     escitalopram (LEXAPRO) 20 MG tablet TAKE 1 TABLET(20 MG) BY MOUTH DAILY 90 tablet 1     fluconazole (DIFLUCAN) 150 MG tablet Take 1 tablet (150 mg) by mouth daily Take additional tablet 72 hours after first if symptoms persist. 2 tablet 1     fluticasone (FLONASE) 50 MCG/ACT nasal spray SHAKE LIQUID AND USE 2 SPRAYS IN EACH NOSTRIL DAILY 48 g 3     LORazepam (ATIVAN) 1 MG tablet Take 1 tablet (1 mg) by mouth every 6 hours as needed (nausea/vomiting, anxiety or sleep) 30 tablet 1     Magnesium Hydroxide (MAGNESIA PO) Take 500 mg by mouth        metFORMIN (GLUCOPHAGE-XR) 500 MG 24 hr tablet TAKE 2 TABLETS BY MOUTH EVERY DAY WITH THE EVENING MEAL 180 tablet 1     Multiple Vitamins-Minerals (MULTIVITAMIN ADULT PO) Take 1 tablet by mouth daily       nitroFURantoin macrocrystal-monohydrate (MACROBID) 100 MG capsule TAKE 1 CAPSULE BY MOUTH AFTER INTERCOURSE AS NEEDED 30 capsule PRN     nystatin (MYCOSTATIN) 390122 UNIT/GM external powder Apply topically 2 times daily as needed Apply to folds under breast and abdomen. 60 g 1     Probiotic Product (PRO-BIOTIC BLEND PO) Take 1 capsule by mouth daily Enzymatic Therapy-probiotic pearls       rivaroxaban ANTICOAGULANT (XARELTO) 20 MG TABS tablet Take 1 tablet (20 mg) by mouth daily (with dinner) 90 tablet 4     tamoxifen (NOLVADEX) 10 MG tablet Cut each pill in 1/2 with pill cutter to ensure 5 mg daily dose. 45 tablet 3     traZODone (DESYREL) 50 MG tablet TAKE 1 TABLET  BY MOUTH EVERY NIGHT AS NEEDED FOR SLEEP 180 tablet PRN        Family History:  Family History   Problem Relation Age of Onset     C.A.D. Mother      Hypertension Mother      Breast Cancer Mother      Psychotic Disorder Mother      Colon Cancer Mother      Cancer Mother         Bone cancer     Coronary Artery Disease Mother      Hyperlipidemia Mother         treated w. statins     Other Cancer Mother         bone/marrow, ,  of this     Depression Mother      Anxiety Disorder Mother      Mental Illness Mother         various undiagnosed types, but THERE     Obesity Mother      Bone Cancer Mother      Other - See Comments Mother         psychotic disease      C.A.D. Father      Diabetes Father      Hypertension Father      Cerebrovascular Disease Father         1984 or      Psychotic Disorder Father      Pancreatic Cancer Father      Coronary Artery Disease Father      Hyperlipidemia Father         treated w statins     Other Cancer Father         pancreatic, ,  of this     Depression Father      Mental Illness Father          likely PTSD and depression     Obesity Father      Other - See Comments Father         cerebrovascular disease, psychotic disease      Psychotic Disorder Sister         x2     Neurologic Disorder Sister      Hypertension Sister      Hyperlipidemia Sister         treated w statins     Depression Sister      Anxiety Disorder Sister      Obesity Sister      Rashes/Skin Problems Sister         precancerous lesion on leg     Psychotic Disorder Sister      Other - See Comments Sister         small kidney stone      Hypertension Sister      Hyperlipidemia Sister         treated w statins     Depression Sister      Anxiety Disorder Sister      Other - See Comments Sister         psychotic disease      Prostate Problems Brother         cleared      Hypertension Brother      Hyperlipidemia Brother         unsure if treated w statins     Depression Brother      Anxiety Disorder Brother      Mental Illness Brother         undiagnosed but THERE     Obesity Brother      Psychotic Disorder Brother         x2     Depression Brother         major depressive disorder and OCD     Anxiety Disorder Brother      Mental Illness Brother         not sure of diagnoses, treated     Hypertension Brother      Hyperlipidemia Brother      Prostate Cancer Brother      Depression Brother      Anxiety Disorder Brother      Other - See Comments Brother         psychotic disease      Psychotic Disorder Maternal Grandmother         ?     Coronary Artery Disease Maternal Grandmother          of heart attack age 84?     Depression Maternal Grandmother      Psychotic Disorder Maternal Grandfather         ?     Psychotic Disorder Paternal Grandmother         Schizophernia     Coronary Artery Disease Paternal Grandmother          of heart attack, age 85?     Depression Paternal Grandmother         severe, but recovered     Mental Illness Paternal Grandmother         possible bipolar or other     Schizophrenia Paternal Grandmother      Psychotic  Disorder Paternal Grandfather         ?     Respiratory Paternal Grandfather          of emphysema and smoking     Emphysema Paternal Grandfather      Cancer - colorectal No family hx of      Glaucoma No family hx of      Macular Degeneration No family hx of        Social History:  Social History     Socioeconomic History     Marital status: Single     Spouse name: Not on file     Number of children: 0     Years of education: Not on file     Highest education level: Not on file   Occupational History     Comment: [employer]   Tobacco Use     Smoking status: Never Smoker     Smokeless tobacco: Never Used   Substance and Sexual Activity     Alcohol use: Yes     Comment: Very infrequent, a bit of wine or beer 2x per month maybe     Drug use: Yes     Types: Marijuana     Comment: infrequent, for celebrations only, maybe 8x per year     Sexual activity: Yes     Partners: Male     Birth control/protection: None     Comment: long-time    Other Topics Concern     Parent/sibling w/ CABG, MI or angioplasty before 65F 55M? No   Social History Narrative     Not on file     Social Determinants of Health     Financial Resource Strain:      Difficulty of Paying Living Expenses:    Food Insecurity:      Worried About Running Out of Food in the Last Year:      Ran Out of Food in the Last Year:    Transportation Needs:      Lack of Transportation (Medical):      Lack of Transportation (Non-Medical):    Physical Activity:      Days of Exercise per Week:      Minutes of Exercise per Session:    Stress:      Feeling of Stress :    Social Connections:      Frequency of Communication with Friends and Family:      Frequency of Social Gatherings with Friends and Family:      Attends [employer] Services:      Active Member of Clubs or Organizations:      Attends Club or Organization Meetings:      Marital Status:    Intimate Partner Violence: Not At Risk     Fear of Current or Ex-Partner: No     Emotionally Abused: No  "    Physically Abused: No     Sexually Abused: No       Physical Exam:  /77 (BP Location: Left arm, Patient Position: Sitting, Cuff Size: Adult Large)   Pulse 107   Temp 98.8  F (37.1  C) (Oral)   Resp 18   Ht 1.6 m (5' 2.99\")   Wt 96.6 kg (212 lb 14.4 oz)   LMP 01/01/2009   SpO2 99%   BMI 37.72 kg/m      GENERAL APPEARANCE: healthy, alert and no distress     NECK: no adenopathy, no asymmetry or masses     RESP: lungs clear to auscultation - no rales, rhonchi or wheezes    BREAST: A multipositional, bilateral breast exam was performed.  Symmetrical, pendulous breasts with everted nipples bilaterally. Grade 4 ptosis. Right breast: no palpable dominant masses, no nipple discharge, no skin changes.Soft tissue.  Right axilla: no palpable adenopathy. Left breast: no palpable dominant masses, no nipple discharge, no skin changes. Left axilla: no palpable adenopathy. Soft tissue.    LYMPHATICS: No cervical, supraclavicular or  axillary lymphadenopathy     CARDIOVASCULAR: regular rate and rhythm, normal S1 S2, no S3 or S4 and no murmur.        SKIN: surgical site on lumbar area where sebaceous cyst was drained, covered with bandage.    Laboratory/Imaging Studies  No results found for any visits on 10/08/21.    ASSESSMENT  We discussed her last screening tests and reviewed results. She has no changes to her family history, other than her sister having a precancerous lesion removed from her leg. She is doing well, working on purchasing a home in the [facility]. She is having her surgical dressing changed later today; she notes that she needs to reschedule with her cardiologist for a follow up of her tachycardia.    She has no breast complaints today and will be having her mammogram next week.She continues to do well with the tamoxifen; I have renewed her prescription today.     Her exam was unremarkable and she will proceed with the following plan.  Individualized Surveillance Plan for women  With 20% or " greater lifetime risk of breast cancer   Per NCCN Breast Cancer Screening and Diagnosis Guidelines Version 1.2021   Recommended screening Test or procedure Last done Next Scheduled    Clinical encounter Clinical exam every 6-12 months.   Refer to genetic counseling if not already done.  Consider risk reduction strategies.   10/8/2021   October 2022   However, some family histories with breast cancers at a very young age, may warrant screening starting earlier.    *May begin at age 40 if breast cancers in the family occur at later ages.    Annual mammogram beginning 10 years younger than the earliest breast cancer in the family but not prior to age 30.    Recommend annual breast MRI to begin 10 years younger than the earliest breast cancer in the family but not prior to age 25.    Breast MRIs are preferably done on day 7-15 of the menstrual cycle in premenopausal women. 10/1/2020- Screening mammogram, BiRads1    3/29/2021- Breast MRI, BiRads1 Mammogram 10/13    Next breast MRI in April 2022    Next exam: October 2022 followed by mammogram   Breast screening for patients at high risk due to thoracic radiation between the ages of 10-30   Annual clinical exam beginning 8 years after radiation therapy.    Annual screening mammogram beginning at age 30 or 8 years after radiation therapy    Annual breast MRI, beginning at age 25 or 8 years after radiation therapy.       NA     NA   Women who have a lifetime risk of >20% based on history of LCIS or ADH/ALH Annual screening mammogram beginning at age of LCIS or ADH/ALH but not prior to age 30.    Consider annual MRI to begin at age of diagnosis of LCIS or ADH/ALH but not prior to age 25.    Consider risk reducing strategies.     NA     NA    Recommend risk reducing strategies for women with 1.7% 5 year risk of breast cancer. On low dose tamoxifen since 10/10/2019 for prevention of breast cancer Continue through October 2024     I spent a total of 29 minutes on the day of the  visit. Please see the note for further information on patient assessment and treatment.    MARLON Chiu-CNS, OCN, AGN-BC  Clinical Nurse Specialist  Cancer Risk Management Program  [facility] [address]  [place] [address]    phone:  [phone]  Pager: [phone]  fax: [phone]    CC; MARLON Reilly-CNP

## 2021-10-07 NOTE — NURSING NOTE
Influenza vaccine given to patient in Left Deltoid . Patient tolerated injection without any incidents.     Has the patient received the information for the injectable influenza vaccine? YES    See MAR for details.    Chandrika Choi CMA  October 7, 2021 3:35 PM

## 2021-10-07 NOTE — TELEPHONE ENCOUNTER
Called Hafsa back and left VM stating that she can show up for her nurse visit on 10/8 whenever she is done with her mammogram. Will move appointment time to 11am. Gave [facility] nurse advice line to call if she needs to call back or has any questions.

## 2021-10-08 ENCOUNTER — ALLIED HEALTH/NURSE VISIT (OUTPATIENT)
Dept: SURGERY | Facility: CLINIC | Age: 66
End: 2021-10-08
Payer: COMMERCIAL

## 2021-10-08 ENCOUNTER — ONCOLOGY VISIT (OUTPATIENT)
Dept: ONCOLOGY | Facility: CLINIC | Age: 66
End: 2021-10-08
Attending: CLINICAL NURSE SPECIALIST
Payer: COMMERCIAL

## 2021-10-08 ENCOUNTER — PATIENT OUTREACH (OUTPATIENT)
Dept: ONCOLOGY | Facility: CLINIC | Age: 66
End: 2021-10-08

## 2021-10-08 VITALS
DIASTOLIC BLOOD PRESSURE: 77 MMHG | SYSTOLIC BLOOD PRESSURE: 107 MMHG | BODY MASS INDEX: 37.72 KG/M2 | OXYGEN SATURATION: 99 % | RESPIRATION RATE: 18 BRPM | WEIGHT: 212.9 LBS | TEMPERATURE: 98.8 F | HEART RATE: 107 BPM | HEIGHT: 63 IN

## 2021-10-08 DIAGNOSIS — Z91.89 AT HIGH RISK FOR BREAST CANCER: ICD-10-CM

## 2021-10-08 DIAGNOSIS — Z12.39 BREAST CANCER SCREENING, HIGH RISK PATIENT: Primary | ICD-10-CM

## 2021-10-08 DIAGNOSIS — L72.3 INFECTED SEBACEOUS CYST: Primary | ICD-10-CM

## 2021-10-08 DIAGNOSIS — L08.9 INFECTED SEBACEOUS CYST: Primary | ICD-10-CM

## 2021-10-08 DIAGNOSIS — R92.30 DENSE BREAST: ICD-10-CM

## 2021-10-08 PROCEDURE — 99024 POSTOP FOLLOW-UP VISIT: CPT

## 2021-10-08 PROCEDURE — G0463 HOSPITAL OUTPT CLINIC VISIT: HCPCS

## 2021-10-08 PROCEDURE — 99213 OFFICE O/P EST LOW 20 MIN: CPT | Performed by: CLINICAL NURSE SPECIALIST

## 2021-10-08 RX ORDER — TAMOXIFEN CITRATE 10 MG/1
TABLET ORAL
Qty: 45 TABLET | Refills: 3 | Status: SHIPPED | OUTPATIENT
Start: 2021-10-08 | End: 2022-09-29

## 2021-10-08 ASSESSMENT — MIFFLIN-ST. JEOR: SCORE: 1469.71

## 2021-10-08 ASSESSMENT — PAIN SCALES - GENERAL: PAINLEVEL: MILD PAIN (2)

## 2021-10-08 NOTE — NURSING NOTE
Patient comes to wound clinic for dressing change  per request of Dr. Vela. She has history of a open wound due to infected cyst which was drained on 10/6/21.    Clean gloves donned, dressing removed. Patient was in severe pain when removing packing. Lidocaine Urojet was applied to wound bed and gale-wound skin. Packing removed once lidocaine took effect. Wound washed with PCMX Soap, irrigated with Normal Saline,and measured.     Wound evaluation:  Size:  1.2 cm length x 0.1 cm width x 1.2 cm depth.    There is no undermining or tunneling noted.    Dressing change: Packed with 1/4in NuGauze, covered with Mepilex. Patient was sent home with extra foam cover dressings and instructed to change the outer dressing as needed if soiled.       PLAN: Patient will return to the clinic for nurse visit on Monday 10/11/21 at 11am for wound dressing change.     Pt has our number should other issues arise. All questions answered for now. Dr. Solomon available in clinic if questions or concerns were to arise.     Shawanda Connelly RN, BSN

## 2021-10-08 NOTE — NURSING NOTE
"Oncology Rooming Note    October 8, 2021 9:49 AM   Hafsa Corona is a 66 year old female who presents for:    Chief Complaint   Patient presents with     Oncology Clinic Visit     FOLLOW UP SURVEILLANCE OF OVARIAN CANCER     Initial Vitals: /77 (BP Location: Left arm, Patient Position: Sitting, Cuff Size: Adult Large)   Pulse 107   Temp 98.8  F (37.1  C) (Oral)   Resp 18   Ht 1.6 m (5' 2.99\")   Wt 96.6 kg (212 lb 14.4 oz)   LMP 01/01/2009   SpO2 99%   BMI 37.72 kg/m   Estimated body mass index is 37.72 kg/m  as calculated from the following:    Height as of this encounter: 1.6 m (5' 2.99\").    Weight as of this encounter: 96.6 kg (212 lb 14.4 oz). Body surface area is 2.07 meters squared.  Mild Pain (2) Comment: Data Unavailable   Patient's last menstrual period was 01/01/2009.  Allergies reviewed: Yes  Medications reviewed: Yes    Medications: Medication refills not needed today.  Pharmacy name entered into [facility]:    [id number] - [place], [place] - [address] AT [facility] OF [id number] [place], [place] - [facility] [place] AT [facility] [place] OF [address]  [facility] [place], [place] - [address] [facility] [id number]    Clinical concerns: Initial BP was 88/56, states she was feeling dizzy earlier. Was 107/77 when rechecked.       Juliann Carias CMA            "

## 2021-10-08 NOTE — LETTER
10/8/2021         RE: Hafsa Corona  [address]  [place] [address]        Dear Colleague,    Thank you for referring your patient, Hafsa Corona, to the [facility]. Please see a copy of my visit note below.    Oncology Risk Management Consultation:  Date on this visit: 10/8/2021    Hafsa Corona  requires heightened screening and surveillance for her higher risk of breast cancer, secondary to a family history of breast cancer in her mother at age 63.     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     She is considered to at high risk for breast cancer and has a 30.3% lifetime risk for breast cancer by the SHERON model. She has a 10.8% 5 year risk by the SHERON model.      Primary Physician:  MARLON Reilly-CNP     History Of Present Illness:  Ms. Corona is a very pleasant  66 year old female who presents with family history of breast cancer.      Genetic testin2017 - Negative for mutations in the AIP, ALK, APC, COSTA, BAP1, BARD1, BLM, BRCA1, BRCA2, BRIP1, BMPR1A, CDH1, CDK4, CDKN1B, CDKN2A, CHEK2, DICER1, EPCAM, FANCC, FH, FLCN, GALNT12, GREM1, HOXB13, MAX, MEN1, MET, MITF, MLH1, MRE11A, MSH2, MSH6, MUTYH, NBN, NF1, NF2, PALB2, PHOX2B, PMS2, POLD1, POLE, POT1, CJAAY6I, PTCH1, PTEN, RAD50, RAD51C, RAD51D, RB1, RET, SDHA, SDHAF2, SDHB, SDHC, SDHD, SMAD4, SMARCA4, SMARCB1, SMARCE1, STK11, SUFU, KLXS308, TP53, TSC1, TSC2, VHL, and XRCC2 genes using a Cancer-Next Expanded panel through [employer].     Pertinent history:  - R salpingo oophorectomy for abnormal imaging (enlarged ovary), biopsy showed endometriosis  2017 - Stage 1C2 Clear cell carcinoma of the L ovary, s/p HELENE/LSO and chemotherapy.  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   Nulliparous.  Menarche at 10.  Menopause: age 54  Hx of OCPs x6 years.  No hx of HRT.  Density:  heterogeneously dense  No history of breast biopsy.  Hx of breast cyst in 1987, aspirated   No history of hyperplasia, atypia, or malignancy  On low dose tamoxifen since 10/10/2019 for prevention of breast cancer     Pertinent screening history:  6/2011 - Screening mammogram, normal by report   2/1/2014 - Screening mammogram, normal by report  9/15/2017 - Screening mammogram, BiRads1.    4/5/2018 - Breast MRI, BiRads2.  9/21/2018: Screening tomosynthesis mammogram, BI-RADS 1.  4/9/2019: Breast MRI, BI-RADS 1.  9/27/2019- Screening tomosynthesis mammogram, BiRads1  10/1/2020- Screening mammogram, BiRads1  3/29/2021- Breast MRI, BiRads1        At this visit, she denies asymmetry, lumps, masses, thickening, pain, nipple discharge and skin changes    Past Medical/Surgical History:  Past Medical History:   Diagnosis Date     Anxiety state, unspecified     [id number]     Arthritis 2014    vague, gradual, not treated really, knees feel it     At high risk for breast cancer 09/27/2019    30.3% lifetime risk by SHERON model     Attention deficit disorder without mention of hyperactivity      Cancer (H) 7/2017 and 1/2018    ovarian and kidney cancers, both treated well     Chronic rhinitis      Depressive disorder lifetime    plus Anxiety plus ADD. Escitalipram, therapy, ADD meds maybe     Heart disease 1999    tachycardia and high cholesterol, managed     History of blood transfusion 07/2017    while in hospital for ovarian cancer     Hypertension 1999    tho BP was too LOW last week. past 12 years Generally OK     Panic disorder without agoraphobia      Pure hypercholesterolemia     Lipitor     Tachycardia, unspecified     9/1999-2/2004     Type II or unspecified type diabetes mellitus without mention of complication, not stated as uncontrolled     diagnosed 2004     Past Surgical History:   Procedure Laterality Date     ABSCESS DRAINAGE Left     left leg      AS REMV KIDNEY,RADICAL Right      BIOPSY  7/2017 and 2/2018     ovarian and kidney cancer biopsies. also 1987 breast benign     BREAST CYST EXCISION  1985     BREAST SURGERY  1987    date unsure. benign breast cyst removed     CATARACT IOL, RT/LT Left 05/18/2018     CATARACT IOL, RT/LT Right 07/1318     COLONOSCOPY  2008 and 2013    polyps removed. Next due fall 2018     ENDOMETRIAL ABLATION  2008     HYSTERECTOMY TOTAL ABDOMINAL, BILATERAL SALPINGO-OOPHORECTOMY, NODE DISSECTION, COMBINED Left 7/7/2017    Procedure: COMBINED HYSTERECTOMY TOTAL ABDOMINAL, SALPINGO-OOPHORECTOMY, NODE DISSECTION;  Exploratory Laparotomy, Left Salpingo-Oophorectomy, Cancer Staging, Total Hysterectomy, Omentectomy, Evacuation of abdominal fluid, Lymph Node Dissection  Anesthesia Block ;  Surgeon: Serena Cole MD;  Location: [facility] OR     HYSTERECTOMY, PAP NO LONGER INDICATED  07/07/2017    Laparotomy; for ovarian cancer staging     HYSTERECTOMY, PAP NO LONGER INDICATED       INSERT PORT VASCULAR ACCESS Right 8/21/2017    Procedure: INSERT PORT VASCULAR ACCESS;  Single Lumen Chest Power Port;  Surgeon: Stephen Mike PA-C;  Location: [facility] OR     LYMPHADENECTOMY RETROPERITONEAL Bilateral 07/07/2017    Laparotomy; pelvics & para-aortics; for ovarian cancer staging     OMENTECTOMY  07/07/2017    Laparotomy; for ovarian cancer staging     ORTHOPEDIC SURGERY  1/2002    broken left jaw due to fall, 4 fractures, titanium plates     OTHER SURGICAL HISTORY  01/2002    OTHER SURGICAL HISTORYfacial surgery due to fall      PHACOEMULSIFICATION CLEAR CORNEA WITH STANDARD INTRAOCULAR LENS IMPLANT Right 7/13/2018    Procedure: PHACOEMULSIFICATION CLEAR CORNEA WITH STANDARD INTRAOCULAR LENS IMPLANT;  RIght Eye Phacoemulsification Clear Cornea with Standard Intraocular Lens Placement ;  Surgeon: Mary Jo Massey MD;  Location: [facility]     PHACOEMULSIFICATION CLEAR CORNEA WITH TORIC INTRAOCULAR LENS IMPLANT Left 5/18/2018    Procedure: PHACOEMULSIFICATION CLEAR CORNEA WITH TORIC INTRAOCULAR LENS IMPLANT;   Left Eye Phacoemulsification with Toric Lens;  Surgeon: Mary Jo Massey MD;  Location: [facility] OR     SALPINGO OOPHORECTOMY,R/L/KAYY Right 2008    Salpingo Oophorectomy, RT     SALPINGO OOPHORECTOMY,R/L/KAYY Left 07/07/2017    Laparotomy; for ovarian cancer staging     SALPINGOOPHORECTOMY  2008     SURGICAL HISTORY OF -       facial surgery d/t fall in 1/2002     SURGICAL HISTORY OF -       1985 removal of breast cyst     SURGICAL HISTORY OF -   2008    endometrial ablation     YAG CAPSULOTOMY OD (RIGHT EYE)  10/22/2018       Allergies:  Allergies as of 10/08/2021 - Reviewed 10/08/2021   Allergen Reaction Noted     Paclitaxel Anaphylaxis and Difficulty breathing 08/30/2017     Wasps [hornets] Anaphylaxis 09/03/2009     Yellow hornet venom [hornet venom] Anaphylaxis and Swelling 05/10/2018     Yellow jacket venom [venomil honey bee] Anaphylaxis and Swelling 05/10/2018     Sulfa drugs Hives and Rash 07/19/2005       Current Medications:  Current Outpatient Medications   Medication Sig Dispense Refill     Acetaminophen (TYLENOL PO) Take 500 mg by mouth 2 tabs prn pain (monthly)       atorvastatin (LIPITOR) 80 MG tablet TAKE 1 TABLET(80 MG) BY MOUTH DAILY 90 tablet 3     B Complex Vitamins (VITAMIN B-COMPLEX PO) Take 50 mg by mouth        CALCIUM-MAGNESIUM-VITAMIN D PO Take 2 tablets by mouth daily       Cholecalciferol (VITAMIN D) 1000 UNIT capsule Take 2,000 Units by mouth daily        DiphenhydrAMINE HCl (BENADRYL PO) Take 50 mg by mouth daily as needed (monthly)       EPINEPHrine (EPIPEN/ADRENACLICK/OR ANY BX GENERIC EQUIV) 0.3 MG/0.3ML injection 2-pack Inject 0.3 mLs (0.3 mg) into the muscle once as needed for anaphylaxis 0.3 mL PRN     escitalopram (LEXAPRO) 20 MG tablet TAKE 1 TABLET(20 MG) BY MOUTH DAILY 90 tablet 1     fluconazole (DIFLUCAN) 150 MG tablet Take 1 tablet (150 mg) by mouth daily Take additional tablet 72 hours after first if symptoms persist. 2 tablet 1     fluticasone (FLONASE) 50 MCG/ACT nasal spray  SHAKE LIQUID AND USE 2 SPRAYS IN EACH NOSTRIL DAILY 48 g 3     LORazepam (ATIVAN) 1 MG tablet Take 1 tablet (1 mg) by mouth every 6 hours as needed (nausea/vomiting, anxiety or sleep) 30 tablet 1     Magnesium Hydroxide (MAGNESIA PO) Take 500 mg by mouth       metFORMIN (GLUCOPHAGE-XR) 500 MG 24 hr tablet TAKE 2 TABLETS BY MOUTH EVERY DAY WITH THE EVENING MEAL 180 tablet 1     Multiple Vitamins-Minerals (MULTIVITAMIN ADULT PO) Take 1 tablet by mouth daily       nitroFURantoin macrocrystal-monohydrate (MACROBID) 100 MG capsule TAKE 1 CAPSULE BY MOUTH AFTER INTERCOURSE AS NEEDED 30 capsule PRN     nystatin (MYCOSTATIN) 285746 UNIT/GM external powder Apply topically 2 times daily as needed Apply to folds under breast and abdomen. 60 g 1     Probiotic Product (PRO-BIOTIC BLEND PO) Take 1 capsule by mouth daily Enzymatic Therapy-probiotic pearls       rivaroxaban ANTICOAGULANT (XARELTO) 20 MG TABS tablet Take 1 tablet (20 mg) by mouth daily (with dinner) 90 tablet 4     tamoxifen (NOLVADEX) 10 MG tablet Cut each pill in 1/2 with pill cutter to ensure 5 mg daily dose. 45 tablet 3     traZODone (DESYREL) 50 MG tablet TAKE 1 TABLET  BY MOUTH EVERY NIGHT AS NEEDED FOR SLEEP 180 tablet PRN        Family History:  Family History   Problem Relation Age of Onset     C.A.D. Mother      Hypertension Mother      Breast Cancer Mother      Psychotic Disorder Mother      Colon Cancer Mother      Cancer Mother         Bone cancer     Coronary Artery Disease Mother      Hyperlipidemia Mother         treated w. statins     Other Cancer Mother         bone/marrow, ,  of this     Depression Mother      Anxiety Disorder Mother      Mental Illness Mother         various undiagnosed types, but THERE     Obesity Mother      Bone Cancer Mother      Other - See Comments Mother         psychotic disease      C.A.D. Father      Diabetes Father      Hypertension Father      Cerebrovascular Disease Father         1984 or       Psychotic Disorder Father      Pancreatic Cancer Father      Coronary Artery Disease Father      Hyperlipidemia Father         treated w statins     Other Cancer Father         pancreatic, ,  of this     Depression Father      Mental Illness Father         likely PTSD and depression     Obesity Father      Other - See Comments Father         cerebrovascular disease, psychotic disease      Psychotic Disorder Sister         x2     Neurologic Disorder Sister      Hypertension Sister      Hyperlipidemia Sister         treated w statins     Depression Sister      Anxiety Disorder Sister      Obesity Sister      Rashes/Skin Problems Sister         precancerous lesion on leg     Psychotic Disorder Sister      Other - See Comments Sister         small kidney stone      Hypertension Sister      Hyperlipidemia Sister         treated w statins     Depression Sister      Anxiety Disorder Sister      Other - See Comments Sister         psychotic disease      Prostate Problems Brother         cleared      Hypertension Brother      Hyperlipidemia Brother         unsure if treated w statins     Depression Brother      Anxiety Disorder Brother      Mental Illness Brother         undiagnosed but THERE     Obesity Brother      Psychotic Disorder Brother         x2     Depression Brother         major depressive disorder and OCD     Anxiety Disorder Brother      Mental Illness Brother         not sure of diagnoses, treated     Hypertension Brother      Hyperlipidemia Brother      Prostate Cancer Brother      Depression Brother      Anxiety Disorder Brother      Other - See Comments Brother         psychotic disease      Psychotic Disorder Maternal Grandmother         ?     Coronary Artery Disease Maternal Grandmother          of heart attack age 84?     Depression Maternal Grandmother      Psychotic Disorder Maternal Grandfather         ?     Psychotic Disorder Paternal Grandmother         Schizophernia     Coronary Artery  Disease Paternal Grandmother          of heart attack, age 85?     Depression Paternal Grandmother         severe, but recovered     Mental Illness Paternal Grandmother         possible bipolar or other     Schizophrenia Paternal Grandmother      Psychotic Disorder Paternal Grandfather         ?     Respiratory Paternal Grandfather          of emphysema and smoking     Emphysema Paternal Grandfather      Cancer - colorectal No family hx of      Glaucoma No family hx of      Macular Degeneration No family hx of        Social History:  Social History     Socioeconomic History     Marital status: Single     Spouse name: Not on file     Number of children: 0     Years of education: Not on file     Highest education level: Not on file   Occupational History     Comment: [employer]   Tobacco Use     Smoking status: Never Smoker     Smokeless tobacco: Never Used   Substance and Sexual Activity     Alcohol use: Yes     Comment: Very infrequent, a bit of wine or beer 2x per month maybe     Drug use: Yes     Types: Marijuana     Comment: infrequent, for celebrations only, maybe 8x per year     Sexual activity: Yes     Partners: Male     Birth control/protection: None     Comment: long-time    Other Topics Concern     Parent/sibling w/ CABG, MI or angioplasty before 65F 55M? No   Social History Narrative     Not on file     Social Determinants of Health     Financial Resource Strain:      Difficulty of Paying Living Expenses:    Food Insecurity:      Worried About Running Out of Food in the Last Year:      Ran Out of Food in the Last Year:    Transportation Needs:      Lack of Transportation (Medical):      Lack of Transportation (Non-Medical):    Physical Activity:      Days of Exercise per Week:      Minutes of Exercise per Session:    Stress:      Feeling of Stress :    Social Connections:      Frequency of Communication with Friends and Family:      Frequency of Social Gatherings with Friends  "and Family:      Attends Judaism Services:      Active Member of Clubs or Organizations:      Attends Club or Organization Meetings:      Marital Status:    Intimate Partner Violence: Not At Risk     Fear of Current or Ex-Partner: No     Emotionally Abused: No     Physically Abused: No     Sexually Abused: No       Physical Exam:  /77 (BP Location: Left arm, Patient Position: Sitting, Cuff Size: Adult Large)   Pulse 107   Temp 98.8  F (37.1  C) (Oral)   Resp 18   Ht 1.6 m (5' 2.99\")   Wt 96.6 kg (212 lb 14.4 oz)   LMP 01/01/2009   SpO2 99%   BMI 37.72 kg/m      GENERAL APPEARANCE: healthy, alert and no distress     NECK: no adenopathy, no asymmetry or masses     RESP: lungs clear to auscultation - no rales, rhonchi or wheezes    BREAST: A multipositional, bilateral breast exam was performed.  Symmetrical, pendulous breasts with everted nipples bilaterally. Grade 4 ptosis. Right breast: no palpable dominant masses, no nipple discharge, no skin changes.Soft tissue.  Right axilla: no palpable adenopathy. Left breast: no palpable dominant masses, no nipple discharge, no skin changes. Left axilla: no palpable adenopathy. Soft tissue.    LYMPHATICS: No cervical, supraclavicular or  axillary lymphadenopathy     CARDIOVASCULAR: regular rate and rhythm, normal S1 S2, no S3 or S4 and no murmur.        SKIN: surgical site on lumbar area where sebaceous cyst was drained, covered with bandage.    Laboratory/Imaging Studies  No results found for any visits on 10/08/21.    ASSESSMENT  We discussed her last screening tests and reviewed results. She has no changes to her family history, other than her sister having a precancerous lesion removed from her leg. She is doing well, working on purchasing a home in the [facility]. She is having her surgical dressing changed later today; she notes that she needs to reschedule with her cardiologist for a follow up of her tachycardia.    She has no breast complaints today " and will be having her mammogram next week.She continues to do well with the tamoxifen; I have renewed her prescription today.     Her exam was unremarkable and she will proceed with the following plan.  Individualized Surveillance Plan for women  With 20% or greater lifetime risk of breast cancer   Per NCCN Breast Cancer Screening and Diagnosis Guidelines Version 1.2021   Recommended screening Test or procedure Last done Next Scheduled    Clinical encounter Clinical exam every 6-12 months.   Refer to genetic counseling if not already done.  Consider risk reduction strategies.   10/8/2021   October 2022   However, some family histories with breast cancers at a very young age, may warrant screening starting earlier.    *May begin at age 40 if breast cancers in the family occur at later ages.    Annual mammogram beginning 10 years younger than the earliest breast cancer in the family but not prior to age 30.    Recommend annual breast MRI to begin 10 years younger than the earliest breast cancer in the family but not prior to age 25.    Breast MRIs are preferably done on day 7-15 of the menstrual cycle in premenopausal women. 10/1/2020- Screening mammogram, BiRads1    3/29/2021- Breast MRI, BiRads1 Mammogram 10/13    Next breast MRI in April 2022    Next exam: October 2022 followed by mammogram   Breast screening for patients at high risk due to thoracic radiation between the ages of 10-30   Annual clinical exam beginning 8 years after radiation therapy.    Annual screening mammogram beginning at age 30 or 8 years after radiation therapy    Annual breast MRI, beginning at age 25 or 8 years after radiation therapy.       NA     NA   Women who have a lifetime risk of >20% based on history of LCIS or ADH/ALH Annual screening mammogram beginning at age of LCIS or ADH/ALH but not prior to age 30.    Consider annual MRI to begin at age of diagnosis of LCIS or ADH/ALH but not prior to age 25.    Consider risk reducing  strategies.     NA     NA    Recommend risk reducing strategies for women with 1.7% 5 year risk of breast cancer. On low dose tamoxifen since 10/10/2019 for prevention of breast cancer Continue through October 2024     I spent a total of 29 minutes on the day of the visit. Please see the note for further information on patient assessment and treatment.    MARLON Chiu-CNS, OCN, AGN-BC  Clinical Nurse Specialist  Cancer Risk Management Program  [facility] [address]  [place] [address]    phone:  [phone]  Pager: [phone]  fax: [phone]    CC; MARLON Reilly-CNP

## 2021-10-08 NOTE — PATIENT INSTRUCTIONS
Individualized Surveillance Plan for women  With 20% or greater lifetime risk of breast cancer   Per NCCN Breast Cancer Screening and Diagnosis Guidelines Version 1.2021   Recommended screening Test or procedure Last done Next Scheduled    Clinical encounter Clinical exam every 6-12 months.   Refer to genetic counseling if not already done.  Consider risk reduction strategies.   10/8/2021   October 2022   However, some family histories with breast cancers at a very young age, may warrant screening starting earlier.    *May begin at age 40 if breast cancers in the family occur at later ages.    Annual mammogram beginning 10 years younger than the earliest breast cancer in the family but not prior to age 30.    Recommend annual breast MRI to begin 10 years younger than the earliest breast cancer in the family but not prior to age 25.    Breast MRIs are preferably done on day 7-15 of the menstrual cycle in premenopausal women. 10/1/2020- Screening mammogram, BiRads1    3/29/2021- Breast MRI, BiRads1 [id number]    Next breast MRI in April 2022    Next exam: October 2022 followed by mammogram   Breast screening for patients at high risk due to thoracic radiation between the ages of 10-30   Annual clinical exam beginning 8 years after radiation therapy.    Annual screening mammogram beginning at age 30 or 8 years after radiation therapy    Annual breast MRI, beginning at age 25 or 8 years after radiation therapy.       NA     NA   Women who have a lifetime risk of >20% based on history of LCIS or ADH/ALH Annual screening mammogram beginning at age of LCIS or ADH/ALH but not prior to age 30.    Consider annual MRI to begin at age of diagnosis of LCIS or ADH/ALH but not prior to age 25.    Consider risk reducing strategies.     NA     NA    Recommend risk reducing strategies for women with 1.7% 5 year risk of breast cancer. On low dose tamoxifen since 10/10/2019 for prevention of breast cancer Continue through October  2024

## 2021-10-08 NOTE — TELEPHONE ENCOUNTER
Patient will be having port removed via IR.     RN was asked to enquire about holding xarelto prior.     Per IR team:  Per our guidelines     No need to hold xarelto      Patient will be updated    Morelia Lehman RN

## 2021-10-10 LAB — BACTERIA FLD CULT: ABNORMAL

## 2021-10-11 ENCOUNTER — ALLIED HEALTH/NURSE VISIT (OUTPATIENT)
Dept: SURGERY | Facility: CLINIC | Age: 66
End: 2021-10-11
Payer: COMMERCIAL

## 2021-10-11 DIAGNOSIS — L72.3 INFECTED SEBACEOUS CYST: Primary | ICD-10-CM

## 2021-10-11 DIAGNOSIS — L08.9 INFECTED SEBACEOUS CYST: Primary | ICD-10-CM

## 2021-10-11 PROCEDURE — 99024 POSTOP FOLLOW-UP VISIT: CPT

## 2021-10-11 NOTE — NURSING NOTE
Patient comes to wound clinic for dressing change  per request of Dr. Vela. She has history of a open wound due to infected cyst which was drained on 10/6/21.     Clean gloves donned, dressing removed. 2mL of Lidocaine Urojet was applied to wound bed and gale-wound skin, let sit for 5 min. Packing removed. Wound washed with PCMX Soap, irrigated with Normal Saline,and measured.      Wound evaluation:  Size:  0.1 cm length x 1 cm width x 1.2 cm depth.    There is no undermining or tunneling noted.     Dressing change: Packed with 1/4in NuGauze, covered with Mepilex. Patient was sent home with extra foam cover dressings and instructed to change the outer dressing as needed if soiled.       PLAN: Patient will return to the clinic for appointment with Dr. Vela on 10/14 at 3:30pm.      Pt has our number should other issues arise. All questions answered for now. Dr. Styles available in clinic if questions or concerns were to arise. Had Dr. Styles review wound culture results, states that since patient's wound is without s/sx of infection and patient is not running fevers, having chills, or malaise that he would not recommend any further antibiotics at this time. Patient should call the General Surgery Clinic if any s/sx of infection arise. Review s/sx of infection to report.      Shawanda Connelly RN, BSN

## 2021-10-13 ENCOUNTER — ANCILLARY PROCEDURE (OUTPATIENT)
Dept: MAMMOGRAPHY | Facility: CLINIC | Age: 66
End: 2021-10-13
Attending: CLINICAL NURSE SPECIALIST
Payer: COMMERCIAL

## 2021-10-13 ENCOUNTER — THERAPY VISIT (OUTPATIENT)
Dept: PHYSICAL THERAPY | Facility: CLINIC | Age: 66
End: 2021-10-13
Payer: COMMERCIAL

## 2021-10-13 DIAGNOSIS — Z91.89 AT HIGH RISK FOR BREAST CANCER: ICD-10-CM

## 2021-10-13 DIAGNOSIS — M62.89 PELVIC FLOOR DYSFUNCTION: Primary | ICD-10-CM

## 2021-10-13 PROCEDURE — 97110 THERAPEUTIC EXERCISES: CPT | Mod: GP | Performed by: PHYSICAL THERAPIST

## 2021-10-13 PROCEDURE — 77063 BREAST TOMOSYNTHESIS BI: CPT | Performed by: RADIOLOGY

## 2021-10-13 PROCEDURE — 97530 THERAPEUTIC ACTIVITIES: CPT | Mod: GP | Performed by: PHYSICAL THERAPIST

## 2021-10-13 PROCEDURE — 77067 SCR MAMMO BI INCL CAD: CPT | Performed by: RADIOLOGY

## 2021-10-14 ENCOUNTER — OFFICE VISIT (OUTPATIENT)
Dept: SURGERY | Facility: CLINIC | Age: 66
End: 2021-10-14
Payer: COMMERCIAL

## 2021-10-14 ENCOUNTER — TELEPHONE (OUTPATIENT)
Dept: SURGERY | Facility: CLINIC | Age: 66
End: 2021-10-14

## 2021-10-14 VITALS
WEIGHT: 213.7 LBS | TEMPERATURE: 98.3 F | HEIGHT: 63 IN | BODY MASS INDEX: 37.86 KG/M2 | RESPIRATION RATE: 16 BRPM | HEART RATE: 102 BPM | DIASTOLIC BLOOD PRESSURE: 73 MMHG | SYSTOLIC BLOOD PRESSURE: 108 MMHG | OXYGEN SATURATION: 94 %

## 2021-10-14 DIAGNOSIS — Z11.59 ENCOUNTER FOR SCREENING FOR OTHER VIRAL DISEASES: ICD-10-CM

## 2021-10-14 DIAGNOSIS — R22.2 MASS OF SUBCUTANEOUS TISSUE OF BACK: Primary | ICD-10-CM

## 2021-10-14 PROCEDURE — 99024 POSTOP FOLLOW-UP VISIT: CPT | Performed by: SURGERY

## 2021-10-14 ASSESSMENT — PAIN SCALES - GENERAL: PAINLEVEL: NO PAIN (1)

## 2021-10-14 ASSESSMENT — MIFFLIN-ST. JEOR: SCORE: 1473.47

## 2021-10-14 NOTE — PATIENT INSTRUCTIONS
You met with Dr. Norris Vela.      Today's visit instructions:    Dr. Vela would like to arrange a telephone call with you on 10/27 at 8:45 AM to check-in with you.    Tenzin, our Surgery Scheduler, will be calling you to arrange a procedure. Dr. Vela would like to wait until December to remove the cyst.     If you have questions please contact Kaia RN or Shawanda RN during regular clinic hours, Monday through Friday 7:30 AM - 4:00 PM, or you can contact us via DialedIN at anytime.       If you have urgent needs after-hours, weekends, or holidays please call the hospital at [phone] and ask to speak with our on-call General Surgery Team.    Appointment [phone], option #1   Nurse Advice (Kaia or Shawanda): [phone]   Surgery Scheduler (Tenzin): [phone]  Fax: [phone]

## 2021-10-14 NOTE — NURSING NOTE
"Chief Complaint   Patient presents with     Follow Up     Follow up for wound check       Vitals:    10/14/21 1230   BP: 108/73   BP Location: Left arm   Patient Position: Sitting   Cuff Size: Adult Regular   Pulse: 102   Resp: 16   Temp: 98.3  F (36.8  C)   TempSrc: Oral   SpO2: 94%   Weight: 213 lb 11.2 oz   Height: 5' 3\"       Body mass index is 37.86 kg/m .          Emilia Grijalva CMA    "

## 2021-10-14 NOTE — LETTER
"10/14/2021       RE: Hafsa Corona  [address]  [place] [address]     Dear Colleague,    Thank you for referring your patient, Hafsa Corona, to the [facility]. Please see a copy of my visit note below.    I saw Hafsa Corona today in clinic for follow-up of an infected cyst that I drained in clinic one week ago along her left lower back. She has been seen in clinic since then for packing change x2.    She reports feeling well. No fever, chills, nausea or vomiting.  Mild stomach upset of unclear significance.  The packing was dislodged during a bandage change yesterday.    PE:  /73 (BP Location: Left arm, Patient Position: Sitting, Cuff Size: Adult Regular)   Pulse 102   Temp 98.3  F (36.8  C) (Oral)   Resp 16   Ht 1.6 m (5' 3\")   Wt 96.9 kg (213 lb 11.2 oz)   LMP 01/01/2009   SpO2 94%   BMI 37.86 kg/m    A+Ox3, NAD, pleasant  RRR  No resp distress or wheezing  Abd soft, nondistended.    Along her lower back there is a 1.5cm wound from her I+D. Resolving erythema. No drainage present.  The wound is jeniffer nicely.  I loosely packed it.    A/P:  Healing wound without infection.  She will keep the packing in for 2 days and then remove it without further packing.  Ok to shower and get the site wet.  She would like the cyst excised when able- we will schedule excision under local for December.    -to OR December for excision of sebaceous cyst        Again, thank you for allowing me to participate in the care of your patient.      Sincerely,    Norris Vela MD      "

## 2021-10-14 NOTE — PROGRESS NOTES
DISCHARGE  REPORT      SUBJECTIVE  Subjective: She has been busy with her sebaceous cyst and changing her bandages and taking antibiotics. She has not been keeping up with her home exercise program. The pt has been having a few episodes of urinary incontinence each week. Sometimes she will have a strong urge to urinate when she has waited too long to void, which will result in a small amount of leaking. Pt notes two episodes of fecal incontinence with urgency since her last session.  The pt arrives 15 minutes late.  Current pain level is 0/10  .      Initial Pain level: 0/10.   Changes in function:  None  Adverse reaction to treatment or activity: None    OBJECTIVE  Changes noted in objective findings:  Yes,   Objective:   bristol stool scale: 3-5   frequency of BM: 1-2x a day  internal vaginal PFM assessment: strength: 4/5, good relaxation, endurance: 8-10 seconds, quick flicks: 10/10     ASSESSMENT/PLAN  Updated problem list and treatment plan: Diagnosis 1:  Pelvic floor dysfunction  Decreased strength - therapeutic exercise, therapeutic activities and home program  Impaired muscle performance - biofeedback, electric stimulation, neuro re-education and home program  STG/LTGs have been met or progress has been made towards goals:  Yes (See Goal flow sheet completed today.)  Assessment of Progress: The patient's condition has potential to improve.  Self Management Plans:  Patient has been instructed in a home treatment program.  Patient  has been instructed in self management of symptoms.  I have re-evaluated this patient and find that the nature, scope, duration and intensity of the therapy is appropriate for the medical condition of the patient.  Hafsa continues to require the following intervention to meet STG and LTG's:  PT    Recommendations:  This patient would benefit from being discharged from physical therapy to continue home exercise plan.    Please refer to the daily flowsheet for treatment today, total  treatment time and time spent performing 1:1 timed codes.

## 2021-10-14 NOTE — TELEPHONE ENCOUNTER
[facility] Call Center    Phone Message    May a detailed message be left on voicemail: yes     Reason for Call: Other: Hafsa is calling in asking for a call back. She states that on 10/13 the packing came out of her wound, and would like to know if she should come in earlier for her 10/14 appt. Please call back as soon as possible to discuss.     Action Taken: Message routed to:  [facility] ([facility] Gen Surg    Travel Screening: Not Applicable

## 2021-10-14 NOTE — TELEPHONE ENCOUNTER
Returned patient's call. States packing fell out late last night after trying to remove the outer dressing. She is wanting to come in earlier to have that replaced. Changed appointment time to 1215 today instead of 1530.

## 2021-10-14 NOTE — PROGRESS NOTES
"I saw Hafsa Corona today in clinic for follow-up of an infected cyst that I drained in clinic one week ago along her left lower back. She has been seen in clinic since then for packing change x2.    She reports feeling well. No fever, chills, nausea or vomiting.  Mild stomach upset of unclear significance.  The packing was dislodged during a bandage change yesterday.    PE:  /73 (BP Location: Left arm, Patient Position: Sitting, Cuff Size: Adult Regular)   Pulse 102   Temp 98.3  F (36.8  C) (Oral)   Resp 16   Ht 1.6 m (5' 3\")   Wt 96.9 kg (213 lb 11.2 oz)   LMP 01/01/2009   SpO2 94%   BMI 37.86 kg/m    A+Ox3, NAD, pleasant  RRR  No resp distress or wheezing  Abd soft, nondistended.    Along her lower back there is a 1.5cm wound from her I+D. Resolving erythema. No drainage present.  The wound is jeniffer nicely.  I loosely packed it.    A/P:  Healing wound without infection.  She will keep the packing in for 2 days and then remove it without further packing.  Ok to shower and get the site wet.  She would like the cyst excised when able- we will schedule excision under local for December.    -to OR December for excision of sebaceous cyst    "

## 2021-10-15 ENCOUNTER — TELEPHONE (OUTPATIENT)
Dept: SURGERY | Facility: CLINIC | Age: 66
End: 2021-10-15

## 2021-10-15 DIAGNOSIS — D17.71 ANGIOMYOLIPOMA OF KIDNEY: Primary | ICD-10-CM

## 2021-10-15 NOTE — TELEPHONE ENCOUNTER
Attempted to contact patient in order to schedule surgery, no answer. Left voicemail and [employer] message sent instructing patient to return call to [phone].

## 2021-10-17 LAB — BACTERIA FLD CULT: ABNORMAL

## 2021-10-26 ENCOUNTER — PATIENT OUTREACH (OUTPATIENT)
Dept: SURGERY | Facility: CLINIC | Age: 66
End: 2021-10-26

## 2021-10-26 NOTE — PROGRESS NOTES
Patient Telephone Reminder Call    Date of call:  10/26/21  Phone numbers:  Home number on file [phone] (home)    Reached patient/confirmed appointment:  No - left message:   on voicemail  Appointment with:   Dr. Norris Vela  Reason for visit:  Wound follow-up

## 2021-10-27 ENCOUNTER — PATIENT OUTREACH (OUTPATIENT)
Dept: SURGERY | Facility: CLINIC | Age: 66
End: 2021-10-27

## 2021-10-27 ENCOUNTER — MYC MEDICAL ADVICE (OUTPATIENT)
Dept: FAMILY MEDICINE | Facility: CLINIC | Age: 66
End: 2021-10-27

## 2021-10-27 ENCOUNTER — VIRTUAL VISIT (OUTPATIENT)
Dept: SURGERY | Facility: CLINIC | Age: 66
End: 2021-10-27
Payer: COMMERCIAL

## 2021-10-27 VITALS — BODY MASS INDEX: 37.39 KG/M2 | WEIGHT: 211 LBS | HEIGHT: 63 IN

## 2021-10-27 DIAGNOSIS — Z11.59 ENCOUNTER FOR SCREENING FOR OTHER VIRAL DISEASES: ICD-10-CM

## 2021-10-27 DIAGNOSIS — L72.3 INFECTED SEBACEOUS CYST: Primary | ICD-10-CM

## 2021-10-27 DIAGNOSIS — L08.9 INFECTED SEBACEOUS CYST: Primary | ICD-10-CM

## 2021-10-27 PROCEDURE — 99212 OFFICE O/P EST SF 10 MIN: CPT | Mod: 95 | Performed by: SURGERY

## 2021-10-27 ASSESSMENT — PAIN SCALES - GENERAL: PAINLEVEL: NO PAIN (0)

## 2021-10-27 ASSESSMENT — MIFFLIN-ST. JEOR: SCORE: 1461.22

## 2021-10-27 NOTE — PROGRESS NOTES
Called patient and went over surgery instructions over the phone. She is aware that she needs to hold her Xarelto for 48hr prior to exicison. Surgery instructions were also sent via [employer].   Pre and Post op Patient Education/Teaching Flowsheet  Relevant Diagnosis:  Cyst of left lower back  Teaching Topic:  Pre and post op teaching  Person(s) Involved in teaching:  Patient     Motivation Level:  Asks Questions:  Yes  Eager to Learn:  Yes  Cooperative:  Yes  Receptive (willing/able to accept information):  Yes  Any cultural factors/Mandaeism beliefs that may influence understanding or compliance?  No    Patient/caregiver/family demonstrates understanding of the following:  Reason for the appointment, diagnosis, and treatment plan:  Yes  Patient demonstrates understanding of the following:  Pre-op bowel prep:  N/A  Post-op pain management recommendations (medications, ice compress, binder/athletic supporter (if applicable), etc.:  Yes  Inguinal hernia patients:  Post-op urinary retention- discussed signs/symptoms and visit to ER for Chatterjee catheter placement and to stay in place for at least 48 hours:  NA  Restrictions:  NA  Medications to take the day of surgery:  Per PCP  Blood thinner medications discussed and when to stop (if applicable):  Yes, 48hr prior  Wound care:  Yes  Diabetes medication management (if applicable):  Per PCP  Which situations necessitate calling provider and whom to contact:  Discussed how to contact the hospital, nurse, and clinic scheduling staff if necessary      Date and time of surgery:  TBD  Location of surgery: [facility]- 5th Floor  History and Physical and any other testing necessary prior to surgery:  Yes, will be done by surgeon on day of  Required time line for completion of History and Physical and any pre-op testing:  NA  Discuss need for someone to drive patient home and stay with them for 24 hours:  NA  Pre-op showering/scrub information with  Surgical Scrub:  Yes  NPO Guidelines:  NA  COVID-19 Testing:  Yes    Infection Prevention: Patient demonstrates understanding of the following:  Patient instructed on hand hygiene:  Yes  Surgical procedure site care will be taught and will be reviewed at the time of discharge  Signs and symptoms of infection taught:  Yes  Wound care reviewed and will be taught at the time of discharge  Central venous catheter care will be taught at the time of discharge (if applicable)    Post-op follow-up:  Instructional materials used/given/mailed:  [place] Surgery Booklet, post op teaching sheet, Map, Soap, and arrival/location information    Surgical instructions mailed to patient

## 2021-10-27 NOTE — PATIENT INSTRUCTIONS
You met with Dr. Norris Vela.      Today's visit instructions:  Dr. Vela recommends that you schedule surgery for sometime in mid-December. Also, Dr. Vela recommends that you hold your Xarelto for 48hr prior to the procedure.     Reminder: Surgery Requirements  1. Your surgery will be at the [facility]- 5th Floor  2. You will need to arrive 1 hour early based on the location of your surgery.  3. You will need a Pre-op physical before your procedure- your surgeon will do this the day of surgery.   4. Stop any blood thinners, vitamins, minerals, or herbal supplements 5 days before surgery.  If you are taking a prescribed blood thinner please let us know for specific instructions.  5. You may eat/drink/drive without restrictions/limitations.  6. Wash with the soap (Antibacterial, Dial Complete Foam, Hibiclense, or soap given/mailed from the clinic) the night before and morning of surgery. See instructions in the Surgery Packet.  7. You will need to undergo a COVID-19 test 2-4 days prior to your scheduled procedure.  Our surgery scheduler will help arrange testing.  8. If you would like a procedure estimate please call Jina GIFFORD Financial Counselor at [phone] or [phone].    At this time, any patient that does not have COVID-19 testing within 4 days of surgery and results available to the surgeon and anesthesia team before the procedure may have their procedure postponed or canceled. We do this to keep our patients, providers, and employees safe. If you decline to test, then you will need to contact your surgeon to determine when or if your procedure will still take place.    OR    We highly encourage patients to get tested for COVID-19 at one of our designated [facility] testing sites. We process the tests in our lab, which allows us to get the results quickly. If you choose to get tested at a non-[facility] location, you will need to contact your primary  care provider to make those arrangements and ensure the results are available to your surgeon before you arrive for your procedure. If we do not receive the results in time, your procedure may be postponed or canceled. Please make sure your test is collected 3-days prior to your procedure date. The results will need to get faxed to [phone].        If you have questions please contact Kaia RN or Shawanda RN during regular clinic hours, Monday through Friday 7:30 AM - 4:00 PM, or you can contact us via [employer] at anytime.       If you have urgent needs after-hours, weekends, or holidays please call the hospital at [phone] and ask to speak with our on-call General Surgery Team.    Appointment [phone], option #1   Nurse Advice (Kaia or Shawanda): [phone]   Surgery Scheduler (Tenzin): [phone]  Fax: [phone]    After Your Mass/Lump Removal       Incision care     You may take a shower the day after surgery. Carefully wash your incision with soap and water. Do not submerge yourself in water (bath, whirlpool, hot tub, pool, lake) for 14 days after surgery.     Remove the gauze bandage 24 hours after surgery, but leave the medical tape (Steri-Strips) or glue in place. These will loosen and fall off on their own 1-2 weeks after surgery.       Always wash your hands before touching your incisions or removing bandages.     It is not unusual to form a collection of fluid or blood under your incision that may feel firm or squishy- it can take several weeks to months for your body to reabsorb it.  At times, it may even drain.  If that should happen keep the area clean with soap, water,  and cover with a clean gauze dressing. You can change this daily or as needed.     Other medicines     Wait to start aspirin or blood thinners until the day after surgery. You can continue your regular medicines at your normal time the day after surgery.     Your pain medicine may cause constipation (hard, dry  stools). To help with this, take the stool softener your doctor gave you or an over-the-counter stool softener or laxative. You can stop taking this when you are no longer taking pain medicine and your bowel movements are back to normal.      For pain or discomfort     Take the narcotic pain medicine your doctor gave you as needed and as instructed on the bottle. If you prefer to use over-the-counter medication, use acetaminophen (Tylenol) or ibuprofen (Advil, Motrin) as instructed on the box. Do not take Tylenol if it is in your narcotic pain medication.      Use an ice pack on your surgical cut (incision) for 20 minutes at a time as needed for the first 24 hours. Be sure to protect your skin by putting a cloth between the ice pack and your skin.      Activities   No driving until you feel it s safe to do so. Don t drive while taking narcotic pain medicine.      Diet   You can eat your regular meals after surgery.      Results   You should know any test results about 5 to 7 business days after surgery       When to call the doctor   Call your doctor if you have:     A fever above 101 F (38.3 C) (taken under the tongue), or a fever or chills lasting more than a day.     Redness at the incision site.     Any fluid or blood draining from the incision, especially if it smells bad.      Severe pain that doesn t improve with pain medicine.      We will call you 2 to 4 days after surgery to review this handout, answer questions and help arrange after-surgery care. If you have questions or concerns, please call [phone] during regular office hours. If you need to call after business hours, call [phone] and ask to page the surgeon on-call.

## 2021-10-27 NOTE — PROGRESS NOTES
Hafsa is a 66 year old who is being evaluated via a billable telephone visit.      What phone number would you like to be contacted at? [phone]  How would you like to obtain your AVS? Arthur  Phone call duration:   8 minutes    I spoke with Hafsa Corona today regarding her healing from our drainage of infected sebaceous cyst. She reports no further drainage, no fever/chills/nausea or vomiting.  She is unable to see the site as it is in her back.    We discussed excising the cyst site in 1-2 months vs observation.  She would like it excised.  Risks/benefits discussed.  We will plan to excise the cyst site along her left back under local anesthetic in December of this year.    Plan:  To OR for excision under local.    I spent 15 minutes on this visit- chart review, counseling, documentation.

## 2021-10-27 NOTE — LETTER
10/27/2021       RE: Hafsa Corona  [address]  [place] [address]     Dear Colleague,    Thank you for referring your patient, Hafsa Corona, to the [facility] [place] [facility]. Please see a copy of my visit note below.      I spoke with Hafsa Corona today regarding her healing from our drainage of infected sebaceous cyst. She reports no further drainage, no fever/chills/nausea or vomiting.  She is unable to see the site as it is in her back.    We discussed excising the cyst site in 1-2 months vs observation.  She would like it excised.  Risks/benefits discussed.  We will plan to excise the cyst site along her left back under local anesthetic in December of this year.    Plan:  To OR for excision under local.    I spent 15 minutes on this visit- chart review, counseling, documentation.      Again, thank you for allowing me to participate in the care of your patient.      Sincerely,    Norris Vela MD

## 2021-10-27 NOTE — NURSING NOTE
"Chief Complaint   Patient presents with     Follow Up     Follow-up Mass Tissue on her back Cysts       Vitals:    10/27/21 0842   Weight: 95.7 kg (211 lb)   Height: 1.6 m (5' 3\")       Body mass index is 37.38 kg/m .                         "

## 2021-11-01 ENCOUNTER — LAB (OUTPATIENT)
Dept: LAB | Facility: CLINIC | Age: 66
End: 2021-11-01
Attending: OBSTETRICS & GYNECOLOGY
Payer: COMMERCIAL

## 2021-11-01 DIAGNOSIS — Z11.59 ENCOUNTER FOR SCREENING FOR OTHER VIRAL DISEASES: ICD-10-CM

## 2021-11-01 PROCEDURE — U0003 INFECTIOUS AGENT DETECTION BY NUCLEIC ACID (DNA OR RNA); SEVERE ACUTE RESPIRATORY SYNDROME CORONAVIRUS 2 (SARS-COV-2) (CORONAVIRUS DISEASE [COVID-19]), AMPLIFIED PROBE TECHNIQUE, MAKING USE OF HIGH THROUGHPUT TECHNOLOGIES AS DESCRIBED BY CMS-2020-01-R: HCPCS | Mod: 90 | Performed by: PATHOLOGY

## 2021-11-01 PROCEDURE — U0005 INFEC AGEN DETEC AMPLI PROBE: HCPCS | Mod: 90 | Performed by: PATHOLOGY

## 2021-11-02 LAB — SARS-COV-2 RNA RESP QL NAA+PROBE: NEGATIVE

## 2021-11-09 ENCOUNTER — LAB (OUTPATIENT)
Dept: LAB | Facility: CLINIC | Age: 66
End: 2021-11-09
Attending: RADIOLOGY
Payer: COMMERCIAL

## 2021-11-09 DIAGNOSIS — Z11.59 ENCOUNTER FOR SCREENING FOR OTHER VIRAL DISEASES: ICD-10-CM

## 2021-11-09 PROCEDURE — U0005 INFEC AGEN DETEC AMPLI PROBE: HCPCS | Mod: 90 | Performed by: PATHOLOGY

## 2021-11-09 PROCEDURE — U0003 INFECTIOUS AGENT DETECTION BY NUCLEIC ACID (DNA OR RNA); SEVERE ACUTE RESPIRATORY SYNDROME CORONAVIRUS 2 (SARS-COV-2) (CORONAVIRUS DISEASE [COVID-19]), AMPLIFIED PROBE TECHNIQUE, MAKING USE OF HIGH THROUGHPUT TECHNOLOGIES AS DESCRIBED BY CMS-2020-01-R: HCPCS | Mod: 90 | Performed by: PATHOLOGY

## 2021-11-10 LAB — SARS-COV-2 RNA RESP QL NAA+PROBE: NEGATIVE

## 2021-11-11 ENCOUNTER — ANESTHESIA EVENT (OUTPATIENT)
Dept: SURGERY | Facility: AMBULATORY SURGERY CENTER | Age: 66
End: 2021-11-11

## 2021-11-12 ENCOUNTER — ANESTHESIA (OUTPATIENT)
Dept: SURGERY | Facility: AMBULATORY SURGERY CENTER | Age: 66
End: 2021-11-12

## 2021-11-12 ENCOUNTER — ANCILLARY PROCEDURE (OUTPATIENT)
Dept: RADIOLOGY | Facility: AMBULATORY SURGERY CENTER | Age: 66
End: 2021-11-12
Attending: OBSTETRICS & GYNECOLOGY
Payer: COMMERCIAL

## 2021-11-12 ENCOUNTER — HOSPITAL ENCOUNTER (OUTPATIENT)
Facility: AMBULATORY SURGERY CENTER | Age: 66
Discharge: HOME | End: 2021-11-12
Attending: PHYSICIAN ASSISTANT | Admitting: PHYSICIAN ASSISTANT
Payer: COMMERCIAL

## 2021-11-12 VITALS
BODY MASS INDEX: 37.39 KG/M2 | DIASTOLIC BLOOD PRESSURE: 87 MMHG | HEIGHT: 63 IN | SYSTOLIC BLOOD PRESSURE: 138 MMHG | WEIGHT: 211 LBS | RESPIRATION RATE: 16 BRPM | TEMPERATURE: 97.3 F | OXYGEN SATURATION: 96 % | HEART RATE: 77 BPM

## 2021-11-12 DIAGNOSIS — Z45.2 ENCOUNTER FOR CARE RELATED TO PORT-A-CATH: ICD-10-CM

## 2021-11-12 DIAGNOSIS — C56.2 OVARIAN CANCER, LEFT (H): ICD-10-CM

## 2021-11-12 LAB
ERYTHROCYTE [DISTWIDTH] IN BLOOD BY AUTOMATED COUNT: 13.2 % (ref 10–15)
GLUCOSE BLDC GLUCOMTR-MCNC: 134 MG/DL (ref 70–99)
HCT VFR BLD AUTO: 36.5 % (ref 35–47)
HGB BLD-MCNC: 11.5 G/DL (ref 11.7–15.7)
INR PPP: 0.93 (ref 0.85–1.15)
MCH RBC QN AUTO: 27.8 PG (ref 26.5–33)
MCHC RBC AUTO-ENTMCNC: 31.5 G/DL (ref 31.5–36.5)
MCV RBC AUTO: 88 FL (ref 78–100)
PLATELET # BLD AUTO: 254 10E3/UL (ref 150–450)
RBC # BLD AUTO: 4.14 10E6/UL (ref 3.8–5.2)
WBC # BLD AUTO: 6 10E3/UL (ref 4–11)

## 2021-11-12 PROCEDURE — 36590 REMOVAL TUNNELED CV CATH: CPT | Performed by: PHYSICIAN ASSISTANT

## 2021-11-12 PROCEDURE — 36590 REMOVAL TUNNELED CV CATH: CPT

## 2021-11-12 PROCEDURE — 82962 GLUCOSE BLOOD TEST: CPT | Performed by: PATHOLOGY

## 2021-11-12 RX ORDER — LIDOCAINE 40 MG/G
CREAM TOPICAL
Status: DISCONTINUED | OUTPATIENT
Start: 2021-11-12 | End: 2021-11-13 | Stop reason: HOSPADM

## 2021-11-12 RX ORDER — ACETAMINOPHEN 325 MG/1
975 TABLET ORAL ONCE
Status: COMPLETED | OUTPATIENT
Start: 2021-11-12 | End: 2021-11-12

## 2021-11-12 RX ORDER — GABAPENTIN 100 MG/1
100 CAPSULE ORAL
Status: DISCONTINUED | OUTPATIENT
Start: 2021-11-12 | End: 2021-11-13 | Stop reason: HOSPADM

## 2021-11-12 RX ORDER — SODIUM CHLORIDE, SODIUM LACTATE, POTASSIUM CHLORIDE, CALCIUM CHLORIDE 600; 310; 30; 20 MG/100ML; MG/100ML; MG/100ML; MG/100ML
INJECTION, SOLUTION INTRAVENOUS CONTINUOUS
Status: DISCONTINUED | OUTPATIENT
Start: 2021-11-12 | End: 2021-11-13 | Stop reason: HOSPADM

## 2021-11-12 RX ORDER — ALBUTEROL SULFATE 0.83 MG/ML
2.5 SOLUTION RESPIRATORY (INHALATION) EVERY 4 HOURS PRN
Status: DISCONTINUED | OUTPATIENT
Start: 2021-11-12 | End: 2021-11-13 | Stop reason: HOSPADM

## 2021-11-12 RX ORDER — OXYCODONE HYDROCHLORIDE 5 MG/1
5 TABLET ORAL EVERY 4 HOURS PRN
Status: DISCONTINUED | OUTPATIENT
Start: 2021-11-12 | End: 2021-11-13 | Stop reason: HOSPADM

## 2021-11-12 RX ORDER — HYDROMORPHONE HYDROCHLORIDE 1 MG/ML
0.2 INJECTION, SOLUTION INTRAMUSCULAR; INTRAVENOUS; SUBCUTANEOUS EVERY 5 MIN PRN
Status: DISCONTINUED | OUTPATIENT
Start: 2021-11-12 | End: 2021-11-13 | Stop reason: HOSPADM

## 2021-11-12 RX ORDER — ONDANSETRON 2 MG/ML
4 INJECTION INTRAMUSCULAR; INTRAVENOUS EVERY 30 MIN PRN
Status: DISCONTINUED | OUTPATIENT
Start: 2021-11-12 | End: 2021-11-13 | Stop reason: HOSPADM

## 2021-11-12 RX ORDER — FENTANYL CITRATE 50 UG/ML
50 INJECTION, SOLUTION INTRAMUSCULAR; INTRAVENOUS EVERY 5 MIN PRN
Status: DISCONTINUED | OUTPATIENT
Start: 2021-11-12 | End: 2021-11-13 | Stop reason: HOSPADM

## 2021-11-12 RX ORDER — ONDANSETRON 4 MG/1
4 TABLET, ORALLY DISINTEGRATING ORAL EVERY 30 MIN PRN
Status: DISCONTINUED | OUTPATIENT
Start: 2021-11-12 | End: 2021-11-13 | Stop reason: HOSPADM

## 2021-11-12 RX ORDER — MEPERIDINE HYDROCHLORIDE 25 MG/ML
12.5 INJECTION INTRAMUSCULAR; INTRAVENOUS; SUBCUTANEOUS
Status: DISCONTINUED | OUTPATIENT
Start: 2021-11-12 | End: 2021-11-13 | Stop reason: HOSPADM

## 2021-11-12 RX ADMIN — ACETAMINOPHEN 975 MG: 325 TABLET ORAL at 08:13

## 2021-11-12 ASSESSMENT — MIFFLIN-ST. JEOR: SCORE: 1461.22

## 2021-11-12 NOTE — DISCHARGE INSTRUCTIONS
A collaboration between [facility] and [facility]  Experts in minimally invasive, targeted treatments performed using imaging guidance    Venous Access Device, Port Catheter or Tunneled Central Line Removal    Today you had your existing venous access device removed, either because it was no longer needed or because there was malfunction or infection issues.    After you go home:  - Drink plenty of fluids.  Generally 6-8 (8 ounce) glasses a day is recommended.  - Resume your regular diet unless otherwise ordered by a medical provider.  - Keep any applied tape/gauze dressings clean and dry.  Change tape/gauze dressings if they get wet or soiled.  - You may shower the following day after procedure, however cover and protect from moisture any tape/gauze dressings.  You may let water hit and run over dried skin glue, but do not scrub.  Pat the area dry after showering.  - Port removal incisions are closed with absorbable suture, meaning they do not need to be removed at a later date, and a topical skin adhesive (skin glue).  This glue will wear off in 7-14 days.  Do not remove before this time.  If 14 days have passed and residual glue is present, you may gently remove it.  - You may remove tape/gauze dressings after 5 days if the site looks closed and in the process of healing.  - Do not apply gels, lotions, or ointments to the glue site for the first 10 days as this may cause the glue to prematurely soften and fail.  - Do not perform strenuous activities or lift greater than 10 pounds for the next three days.  - If there is bleeding or oozing from the procedure site, apply firm pressure to the area for 5-10 minutes.  If the bleeding continues seek medical advice at the numbers below.  - Mild procedure site discomfort can be treated with an ice pack and over-the-counter pain relievers.              For 24 hours after any sedation used:  - Relax and take it easy.  No  strenuous activities.  - Do not drive or operate machines at home or at work.  - No alcohol consumption.  - Do not make any important or legal decisions.    Call our Interventional Radiology (IR) service if:  - If you start bleeding from the procedure site.  If you do start to bleed from the site, lie down and hold some pressure on the site.  Our radiology provider can help you decide if you need to return to the hospital.  - If you have new or worsening pain related to the procedure.  - If you have concerning swelling at the procedure site.  - If you develop persistent nausea or vomiting.  - If you develop hives or a rash or any unexplained itching.  - If you have a fever of greater than 100.5  F and chills in the first 5 days after procedure.  - Any other concerns related to your procedure.      [facility]  Interventional Radiology (IR)  [address] [place] [address]    Contact Number:  [phone]  (IR control desk)  - Monday - Friday 8:00 am - 4:30 pm    After hours for urgent concerns:  [phone]  - After 4:30 pm Monday - Friday, Weekends and Holidays.   - Ask for Interventional Radiology on-call.  Someone is available 24 hours a day.  - [facility] toll free number:  [phone]

## 2021-11-12 NOTE — BRIEF OP NOTE
[facility]    Brief Operative Note    Pre-operative diagnosis: Ovarian cancer on left (H) [C56.2]  Post-operative diagnosis Same as pre-operative diagnosis    Procedure: Procedure(s):  REMOVAL, VASCULAR ACCESS PORT right  Surgeon: Surgeon(s) and Role:     * Afsaneh Das PA-C - Primary  Anesthesia: Local   Estimated Blood Loss: Minimal    Drains: None  Specimens: * No specimens in log *  Findings:   None. Removal of right chest port  Complications: None.  Implants:   Implant Name Type Inv. Item Serial No.  Lot No. LRB No. Used Action   CATH PORT POWERPORT CLEARVUE SLIM 6FR [id number]  CATH PORT POWERPORT CLEARVUE SLIM 6FR [id number]  [facility] [id number] Right 1 Explanted

## 2021-11-15 ENCOUNTER — ANCILLARY PROCEDURE (OUTPATIENT)
Dept: CT IMAGING | Facility: CLINIC | Age: 66
End: 2021-11-15
Attending: UROLOGY
Payer: COMMERCIAL

## 2021-11-15 DIAGNOSIS — D17.71 ANGIOMYOLIPOMA OF KIDNEY: ICD-10-CM

## 2021-11-15 LAB
CREAT BLD-MCNC: 1.3 MG/DL (ref 0.5–1)
GFR SERPL CREATININE-BSD FRML MDRD: 43 ML/MIN/1.73M2

## 2021-11-15 PROCEDURE — 74178 CT ABD&PLV WO CNTR FLWD CNTR: CPT | Performed by: RADIOLOGY

## 2021-11-15 RX ORDER — IOPAMIDOL 755 MG/ML
130 INJECTION, SOLUTION INTRAVASCULAR ONCE
Status: COMPLETED | OUTPATIENT
Start: 2021-11-15 | End: 2021-11-15

## 2021-11-15 RX ADMIN — IOPAMIDOL 130 ML: 755 INJECTION, SOLUTION INTRAVASCULAR at 18:16

## 2021-11-17 ENCOUNTER — PRE VISIT (OUTPATIENT)
Dept: UROLOGY | Facility: CLINIC | Age: 66
End: 2021-11-17
Payer: COMMERCIAL

## 2021-11-17 NOTE — TELEPHONE ENCOUNTER
Reason for visit: cysto     Relevant information: hematuria    Records/imaging/labs/orders: in epic    Pt called: no    At Rooming: urine

## 2021-11-19 ENCOUNTER — OFFICE VISIT (OUTPATIENT)
Dept: UROLOGY | Facility: CLINIC | Age: 66
End: 2021-11-19
Payer: COMMERCIAL

## 2021-11-19 VITALS — HEART RATE: 90 BPM | SYSTOLIC BLOOD PRESSURE: 130 MMHG | DIASTOLIC BLOOD PRESSURE: 86 MMHG

## 2021-11-19 DIAGNOSIS — R31.29 MICROSCOPIC HEMATURIA: ICD-10-CM

## 2021-11-19 DIAGNOSIS — D17.71 ANGIOMYOLIPOMA OF KIDNEY: ICD-10-CM

## 2021-11-19 DIAGNOSIS — N39.46 MIXED STRESS AND URGE URINARY INCONTINENCE: Primary | ICD-10-CM

## 2021-11-19 PROCEDURE — 52000 CYSTOURETHROSCOPY: CPT | Performed by: UROLOGY

## 2021-11-19 RX ORDER — LIDOCAINE HYDROCHLORIDE 20 MG/ML
JELLY TOPICAL ONCE
Status: ACTIVE | OUTPATIENT
Start: 2021-11-19

## 2021-11-19 ASSESSMENT — PAIN SCALES - GENERAL: PAINLEVEL: NO PAIN (0)

## 2021-11-19 NOTE — PROGRESS NOTES
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  -Renal ultrasound and urinalysis in 1 year with aurora    Stressful Incontinence improving with pelvic floor physical therapy.  [facility]    Stage 3 renal failure.    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 At that time she was incidentally found to have a 3 cm mass in the superior pole of the right kidney, suspicious for RCC. She had no symptoms of hematuria, flank pain, or abdominal pass at that time. She then underwent a right radical nephrectomy at [facility] in 1/2018. 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 Incidentally there was a 0.4 cm oncocytoma identified with the specimen as well.    Since then, the patient reports that she has been feeling well without hematuria, dysuria, changes in urination, flank pain, or abdominal masses. She notes more frequent UTIs since her gynecologic surgeries in 2017 that are well managed with nitrofurantoin. Ultimately she presents today to establish care in order to follow her past history of right renal angiomyolipoma.     The patient notes recent stress incontinence for which she is currently doing pelvic floor PT with improvement. 1 pad per day.  She would like to discuss other treatment options and follow up plans.    11/19/21  She is here for microscopic hematuria work-up with cystoscopy.    ECOG Performance Score: 0  0=Fully active, 1=Unable to do strenuous activity but capable of light work, 2=Ambulatory and capable of all selfcare, 3=Capable of  limited selfcare, confined to bed >50% of waking hours, 4=Completely disabled.       I reviewed the following laboratory data and went over findings with patient:  Recent Labs   Lab Test 01/14/20  0327 01/03/20  1001 09/27/19  1338 07/09/18  1351 11/14/17  0949 11/14/17  0949   WBC 6.7 7.7 5.8  --   --  4.9   HGB 11.1* 11.8 11.5* 11.5*   < > 9.4*    317 262  --   --  229    < > = values in this interval not displayed.     Recent Labs   Lab Test 03/27/20  1510 01/14/20  0327 01/03/20  1001 09/27/19  1338   CR 1.14* 1.30* 1.24* 1.15*   GFRESTIMATED 50* 43* 45* 50*   GFRESTBLACK 58* 50* 52* 58*   GLC 80 147* 118* 98       CYSTOSCOPY PROCEDURE:  Sterile preparation and drape and an informed consent were performed.  A 16-Persian flexible cystoscope was introduced via the urethra.  The urethra was open.  The bladder mucosa showed no evidence of any lesions or trabeculation.  There were no stones.

## 2021-11-19 NOTE — LETTER
11/19/2021       RE: Hafsa Corona  [address]  [place] [address]     Dear Colleague,    Thank you for referring your patient, Hafsa Corona, to the [facility] [place] at [facility]. Please see a copy of my visit note below.    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  -Renal ultrasound and urinalysis in 1 year with aurora    Stressful Incontinence improving with pelvic floor physical therapy.  She would like consult with [facility] to discuss other options.    Stage 3 renal failure.    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 At that time she was incidentally found to have a 3 cm mass in the superior pole of the right kidney, suspicious for RCC. She had no symptoms of hematuria, flank pain, or abdominal pass at that time. She then underwent a right radical nephrectomy at [facility] in 1/2018. 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 Incidentally there was a 0.4 cm oncocytoma identified with the specimen as well.    Since then, the patient reports that she has been feeling well without hematuria, dysuria, changes in urination, flank pain, or abdominal masses. She notes more frequent UTIs since her gynecologic surgeries in 2017 that are well managed with nitrofurantoin. Ultimately she presents today to establish care in order to follow her past history of right renal angiomyolipoma.     The patient notes recent stress incontinence for which she is currently doing pelvic floor PT with improvement. 1 pad  per day.  She would like to discuss other treatment options and follow up plans.    11/19/21  She is here for microscopic hematuria work-up with cystoscopy.    ECOG Performance Score: 0  0=Fully active, 1=Unable to do strenuous activity but capable of light work, 2=Ambulatory and capable of all selfcare, 3=Capable of limited selfcare, confined to bed >50% of waking hours, 4=Completely disabled.       I reviewed the following laboratory data and went over findings with patient:  Recent Labs   Lab Test 01/14/20  0327 01/03/20  1001 09/27/19  1338 07/09/18  1351 11/14/17  0949 11/14/17  0949   WBC 6.7 7.7 5.8  --   --  4.9   HGB 11.1* 11.8 11.5* 11.5*   < > 9.4*    317 262  --   --  229    < > = values in this interval not displayed.     Recent Labs   Lab Test 03/27/20  1510 01/14/20  0327 01/03/20  1001 09/27/19  1338   CR 1.14* 1.30* 1.24* 1.15*   GFRESTIMATED 50* 43* 45* 50*   GFRESTBLACK 58* 50* 52* 58*   GLC 80 147* 118* 98       CYSTOSCOPY PROCEDURE:  Sterile preparation and drape and an informed consent were performed.  A 16-Tunisian flexible cystoscope was introduced via the urethra.  The urethra was open.  The bladder mucosa showed no evidence of any lesions or trabeculation.  There were no stones.      Leland Mercado MD

## 2021-11-19 NOTE — NURSING NOTE
Chief Complaint   Patient presents with     Cystoscopy       Blood pressure 130/86, pulse 90, last menstrual period 01/01/2009, not currently breastfeeding. There is no height or weight on file to calculate BMI.    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)     Pain in joint, multiple sites     ACP (advance care planning)     Solitary kidney, acquired     Encounter for follow-up surveillance of ovarian cancer     Personal history of DVT (deep vein thrombosis)     Abscess of left lower extremity     ADD (attention deficit disorder) without hyperactivity     Angiomyolipoma of kidney     Anxiety     Coronary atherosclerosis     Depressive disorder     Dyslipidemia     Dysthymic disorder     Generalized anxiety disorder     Heel pain     Lymphedema     Mild anemia     Normocytic anemia     Renal oncocytoma     Panic disorder with agoraphobia     At high risk for breast cancer     Chronic kidney disease, stage 3 (H)       Allergies   Allergen Reactions     Paclitaxel Anaphylaxis and Difficulty breathing     Wasps [Hornets] Anaphylaxis     Yellow Hornet Venom [Hornet Venom] Anaphylaxis and Swelling     Yellow Jacket Venom [Venomil Honey Bee] Anaphylaxis and Swelling     Sulfa Drugs Hives and Rash       Current Outpatient Medications   Medication Sig Dispense Refill     Acetaminophen (TYLENOL PO) Take 500 mg by mouth 2 tabs prn pain  (monthly)       atorvastatin (LIPITOR) 80 MG tablet TAKE 1 TABLET(80 MG) BY MOUTH DAILY 90 tablet 3     B Complex Vitamins (VITAMIN B-COMPLEX PO) Take 50 mg by mouth        CALCIUM-MAGNESIUM-VITAMIN D PO Take 2 tablets by mouth daily       Cholecalciferol (VITAMIN D) 1000 UNIT capsule Take 2,000 Units by mouth daily        DiphenhydrAMINE HCl (BENADRYL PO) Take 50 mg by mouth daily as needed (monthly)       EPINEPHrine (EPIPEN/ADRENACLICK/OR ANY BX GENERIC EQUIV) 0.3 MG/0.3ML injection 2-pack Inject 0.3 mLs (0.3 mg) into the muscle once as needed for anaphylaxis 0.3 mL PRN     escitalopram (LEXAPRO) 20 MG tablet TAKE 1 TABLET(20 MG) BY MOUTH DAILY 90 tablet 1     fluconazole (DIFLUCAN) 150 MG tablet Take 1 tablet (150 mg) by mouth daily Take additional tablet 72 hours after first if symptoms persist. 2 tablet 1     fluticasone (FLONASE) 50 MCG/ACT nasal spray SHAKE LIQUID AND USE 2 SPRAYS IN EACH NOSTRIL DAILY 48 g 3     LORazepam (ATIVAN) 1 MG tablet Take 1 tablet (1 mg) by mouth every 6 hours as needed (nausea/vomiting, anxiety or sleep) 30 tablet 1     Magnesium Hydroxide (MAGNESIA PO) Take 500 mg by mouth       metFORMIN (GLUCOPHAGE-XR) 500 MG 24 hr tablet TAKE 2 TABLETS BY MOUTH EVERY DAY WITH THE EVENING MEAL 180 tablet 1     Multiple Vitamins-Minerals (MULTIVITAMIN ADULT PO) Take 1 tablet by mouth daily       nitroFURantoin macrocrystal-monohydrate (MACROBID) 100 MG capsule TAKE 1 CAPSULE BY MOUTH AFTER INTERCOURSE AS NEEDED 30 capsule PRN     nystatin (MYCOSTATIN) 980865 UNIT/GM external powder Apply topically 2 times daily as needed Apply to folds under breast and abdomen. 60 g 1     Probiotic Product (PRO-BIOTIC BLEND PO) Take 1 capsule by mouth daily Enzymatic Therapy-probiotic pearls       rivaroxaban ANTICOAGULANT (XARELTO) 20 MG TABS tablet Take 1 tablet (20 mg) by mouth daily (with dinner) 90 tablet 4     tamoxifen (NOLVADEX) 10 MG tablet Cut each pill in 1/2 with pill cutter to ensure 5 mg daily  dose. 45 tablet 3     traZODone (DESYREL) 50 MG tablet TAKE 1 TABLET  BY MOUTH EVERY NIGHT AS NEEDED FOR SLEEP 180 tablet PRN       Social History     Tobacco Use     Smoking status: Never Smoker     Smokeless tobacco: Never Used   Substance Use Topics     Alcohol use: Yes     Comment: Very infrequent, a bit of wine or beer 2x per month maybe     Drug use: Yes     Types: Marijuana     Comment: infrequent, for celebrations only, maybe 8x per year       Invasive Procedure Safety Checklist:    Procedure: Cystoscopy    Action: Complete sections and checkboxes as appropriate.    Pre-procedure:  1. Patient ID Verified with 2 identifiers (Radha and  or MRN) : YES    2. Procedure and site verified with patient/designee (when able) : YES    3. Accurate consent documentation in medical record : YES    4. H&P (or appropriate assessment) documented in medical record : N/A  H&P must be up to 30 days prior to procedure an updated within 24 hours of                 Procedure as applicable.     5. Relevant diagnostic and radiology test results appropriately labeled and displayed as applicable : YES    6. Blood products, implants, devices, and/or special equipment available for the procedure as applicable : YES    7. Procedure site(s) marked with provider initials [Exclusions: none] : NO    8. Marking not required. Reason : Yes  Procedure does not require site marking    Time Out:     Time-Out performed immediately prior to starting procedure, including verbal and active participation of all team members addressing: YES    1. Correct patient identity.  2. Confirmed that the correct side and site are marked.  3. An accurate procedure to be done.  4. Agreement on the procedure to be done.  5. Correct patient position.  6. Relevant images and results are properly labeled and appropriately displayed.  7. The need to administer antibiotics or fluids for irrigation purposes during the procedure as applicable.  8. Safety precautions  based on patient history or medication use.    During Procedure: Verification of correct person, site, and procedure occurs any time the responsibility for care of the patient is transferred to another member of the care team.    The following medication was given:     MEDICATION:  Lidocaine without epinephrine 2% jelly  ROUTE: urethral   SITE: urethral   DOSE: 10 mL  LOT #: [id number]  : [employer]  EXPIRATION DATE: 5/23  NDC#: [address]-4   Was there drug waste? No    Prior to med admin, verified patient identity using patient's name and date of birth.  Due to med administration, patient instructed to remain in clinic for 15 minutes  afterwards, and to report any adverse reaction to me immediately.    Drug Amount Wasted:  None.  Vial/Syringe: SANDRINE Huff  11/19/2021  11:42 AM

## 2021-11-19 NOTE — PATIENT INSTRUCTIONS
Please schedule for US renal and UA. Please see one of our APPs in a year.     It was a pleasure meeting with you today.  Thank you for allowing me and my team the privilege of caring for you today.  YOU are the reason we are here, and I truly hope we provided you with the excellent service you deserve.  Please let us know if there is anything else we can do for you so that we can be sure you are leaving completely satisfied with your care experience.

## 2021-11-22 DIAGNOSIS — Z11.59 ENCOUNTER FOR SCREENING FOR OTHER VIRAL DISEASES: ICD-10-CM

## 2021-11-22 PROBLEM — R22.2 MASS OF SUBCUTANEOUS TISSUE OF BACK: Status: ACTIVE | Noted: 2021-11-22

## 2021-11-24 ENCOUNTER — TELEPHONE (OUTPATIENT)
Dept: SURGERY | Facility: CLINIC | Age: 66
End: 2021-11-24
Payer: COMMERCIAL

## 2021-11-24 NOTE — TELEPHONE ENCOUNTER
Patient is scheduled for surgery with Dr. Vela    Spoke with: Sulema    Date of Surgery: 12/6/21    Location: [facility]    Informed patient they will need an adult  yes    Pre op with Provider n/a    H&P: To be done with surgeon    Pre-procedure COVID-19 Test: 12/2/21    Additional imaging/appointments: n/a    Surgery packet: Mailed and sent via [facility] 11/24/21     Additional comments: n/a

## 2021-12-02 ENCOUNTER — PATIENT OUTREACH (OUTPATIENT)
Dept: SURGERY | Facility: CLINIC | Age: 66
End: 2021-12-02

## 2021-12-02 ENCOUNTER — LAB (OUTPATIENT)
Dept: LAB | Facility: CLINIC | Age: 66
End: 2021-12-02
Attending: INTERNAL MEDICINE

## 2021-12-02 DIAGNOSIS — Z11.59 ENCOUNTER FOR SCREENING FOR OTHER VIRAL DISEASES: ICD-10-CM

## 2021-12-02 PROCEDURE — U0005 INFEC AGEN DETEC AMPLI PROBE: HCPCS | Mod: 90 | Performed by: PATHOLOGY

## 2021-12-02 PROCEDURE — U0003 INFECTIOUS AGENT DETECTION BY NUCLEIC ACID (DNA OR RNA); SEVERE ACUTE RESPIRATORY SYNDROME CORONAVIRUS 2 (SARS-COV-2) (CORONAVIRUS DISEASE [COVID-19]), AMPLIFIED PROBE TECHNIQUE, MAKING USE OF HIGH THROUGHPUT TECHNOLOGIES AS DESCRIBED BY CMS-2020-01-R: HCPCS | Mod: 90 | Performed by: PATHOLOGY

## 2021-12-02 PROCEDURE — 99000 SPECIMEN HANDLING OFFICE-LAB: CPT | Performed by: PATHOLOGY

## 2021-12-02 NOTE — PROGRESS NOTES
Patient calling in with questions regarding her upcoming surgery. She is also wondering if she can reschedule her COVID test to tomorrow instead of today. Returned patients phone call and LVM to call back to reschedule her COVID test and discuss her questions regarding surgery. Awaiting patients return call.

## 2021-12-03 ENCOUNTER — TRANSFERRED RECORDS (OUTPATIENT)
Dept: HEALTH INFORMATION MANAGEMENT | Facility: CLINIC | Age: 66
End: 2021-12-03
Payer: COMMERCIAL

## 2021-12-03 LAB — SARS-COV-2 RNA RESP QL NAA+PROBE: NEGATIVE

## 2021-12-06 ENCOUNTER — HOSPITAL ENCOUNTER (OUTPATIENT)
Facility: AMBULATORY SURGERY CENTER | Age: 66
Discharge: HOME | End: 2021-12-06
Attending: SURGERY | Admitting: SURGERY
Payer: COMMERCIAL

## 2021-12-06 VITALS
HEART RATE: 86 BPM | SYSTOLIC BLOOD PRESSURE: 133 MMHG | OXYGEN SATURATION: 98 % | DIASTOLIC BLOOD PRESSURE: 73 MMHG | HEIGHT: 63 IN | BODY MASS INDEX: 37.39 KG/M2 | WEIGHT: 211 LBS | TEMPERATURE: 95.9 F | RESPIRATION RATE: 18 BRPM

## 2021-12-06 DIAGNOSIS — R22.2 MASS OF SUBCUTANEOUS TISSUE OF BACK: ICD-10-CM

## 2021-12-06 PROCEDURE — 11402 EXC TR-EXT B9+MARG 1.1-2 CM: CPT

## 2021-12-06 PROCEDURE — 88305 TISSUE EXAM BY PATHOLOGIST: CPT | Mod: 26 | Performed by: PATHOLOGY

## 2021-12-06 PROCEDURE — 88305 TISSUE EXAM BY PATHOLOGIST: CPT | Mod: TC | Performed by: SURGERY

## 2021-12-06 PROCEDURE — 11402 EXC TR-EXT B9+MARG 1.1-2 CM: CPT | Performed by: SURGERY

## 2021-12-06 RX ORDER — LIDOCAINE HYDROCHLORIDE AND EPINEPHRINE 10; 10 MG/ML; UG/ML
INJECTION, SOLUTION INFILTRATION; PERINEURAL PRN
Status: DISCONTINUED | OUTPATIENT
Start: 2021-12-06 | End: 2021-12-06 | Stop reason: HOSPADM

## 2021-12-06 RX ORDER — BUPIVACAINE HYDROCHLORIDE 2.5 MG/ML
INJECTION, SOLUTION INFILTRATION; PERINEURAL PRN
Status: DISCONTINUED | OUTPATIENT
Start: 2021-12-06 | End: 2021-12-06 | Stop reason: HOSPADM

## 2021-12-06 ASSESSMENT — MIFFLIN-ST. JEOR: SCORE: 1461.22

## 2021-12-06 NOTE — OP NOTE
[facility] Operative Note    Pre-operative diagnosis: Mass of subcutaneous tissue of back [R22.2]   Post-operative diagnosis same   Procedure: Procedure(s):  EXCISION, MASS, BACK   Surgeon: Norris Vela MD   Assistants(s):    Estimated blood loss: Less than 10 ml    Specimens: Back mass   Findings: Likely resolving infected sebaceous cyst       Hafsa Corona was brought to the OR and placed prone on the operating table.  The left back was prepped and draped in sterile fashion and a time out was performed.  Local anesthetic was injected and a 2.5cm  elliptical incision encompassing the scar from prior excision was sharply made.  The mass was circumferentially dissected free of surrounding tissue using cautery dissection.  The mass was located within the subcutaneous.  The mass was removed-measuring 1.5cm- and sent to pathology.      Hemostasis was obtained and additional local anesthetic injected.  The incision was closed in layers with 3.0 vicryl and 4.0 monocryl.  Steri-strips were applied.  Hafsa Corona was then brought to the recovery area in stable condition.    I performed the procedure.

## 2021-12-06 NOTE — DISCHARGE INSTRUCTIONS
Cyst Excision    Do not start your blood thinner (xarelto) until 12/9/21.  Home care  The following guidelines will help you care for your wound:    Keep the wound clean and dry. If a bandage was applied and it gets wet or dirty, replace it. Otherwise, leave it in place for the first 24 hours. Then change it once a day or as directed.    If stitches (sutures) were used, clean the wound daily:  ? After removing the bandage, wash the area with soap and water. Use a cotton swab to loosen and remove any blood or crust that forms.  ? After cleaning, apply a thin layer of petroleum ointment. Or your healthcare provider may advise an antibiotic ointment. This will keep the wound clean and make it easier to remove the stitches. Reapply the bandage.  ? You may shower as normal after the first 24 hours. But don t soak the area in water (no baths or swimming) until the stitches are removed.    If surgical tape closures were used, keep the area clean and dry. If the area gets wet, pat it dry with a towel. After the surgical tape closures have been removed, it's safe to go back to your normal activities.    You may use over-the-counter pain medicine to control pain, unless another medicine was given. Talk with your provider before using these medicines if you have long-term (chronic) liver or kidney disease, or ever had a stomach ulcer or GI (gastrointestinal) bleeding.  Follow-up care  Most skin wounds heal in 10 days. But an infection may sometimes occur, even with correct treatment. Check the wound daily for the signs of infection listed below. Stitches should be taken out in 7 to 14 days. You may have surgical tape closures. If these have not fallen off after 7 days, you can remove them yourself unless you were told otherwise.  When to get medical advice  Call your healthcare provider right away if any of these occur:    Pain in the wound gets worse    Redness, swelling, or pus coming from the wound    Fever of 100.4 F  (38 C) or higher, or as directed by your provider    Stitches come apart or fall out, or surgical tape falls off before 5 days    The wound edges reopen    Numb feeling that doesn t go away by the time stitches are removed  [employer] last reviewed this educational content on 8/1/2019 2000-2021 [employer]. All rights reserved. This information is not intended as a substitute for professional medical care. Always follow your healthcare professional's instructions.        [facility]  Home Care Following Your Procedure  Call a doctor if you have signs of infection (fever, growing tenderness at the surgery site, a large amount of drainage or bleeding, severe pain, foul-smelling drainage, redness, swelling).         Tylenol/Acetaminophen Consumption  To help encourage the safe use of acetaminophen, the makers of TYLENOL  have lowered the maximum daily dose for single-ingredient Extra Strength TYLENOL  (acetaminophen) products sold in the [place] from 8 pills per day (4,000 mg) to 6 pills per day (3,000 mg). The dosing interval has also changed from 2 pills every 4-6 hours to 2 pills every 6 hours.    If you feel your pain relief is insufficient, you may take Tylenol/Acetaminophen in addition to your narcotic pain medication.     Be careful not to exceed 3,000 mg of Tylenol/Acetaminophen in a 24 hour period from all sources.    If you are taking extra strength Tylenol/acetaminophen (500 mg), the maximum dose is 6 tablets in 24 hours.    If you are taking regular strength acetaminophen (325 mg), the maximum dose is 9 tablets in 24 hours.  Your doctor is:  Dr. Norris Vela, General Surgery: [phone]                                    Or dial [phone] and ask for the resident on call for:  General Surgery  For emergency care, call the:  [place]:  [phone] (TTY for hearing impaired: [phone])

## 2021-12-08 ENCOUNTER — PATIENT OUTREACH (OUTPATIENT)
Dept: SURGERY | Facility: CLINIC | Age: 66
End: 2021-12-08
Payer: COMMERCIAL

## 2021-12-08 LAB
PATH REPORT.COMMENTS IMP SPEC: NORMAL
PATH REPORT.COMMENTS IMP SPEC: NORMAL
PATH REPORT.FINAL DX SPEC: NORMAL
PATH REPORT.GROSS SPEC: NORMAL
PATH REPORT.MICROSCOPIC SPEC OTHER STN: NORMAL
PHOTO IMAGE: NORMAL

## 2021-12-08 NOTE — PROGRESS NOTES
RN Post-Op/Post-Discharge Care Coordination Note    Ms. Hafsa Corona is a 66 year old female who underwent back mass excision on 12/6 with Dr. Norris Vela.  Spoke with Patient.    Support  Patient able to care for self independently     Health Status  Nausea/Vomiting: Patient denies nausea/vomiting.  Eating/drinking: Patient is able to eat and drink without any complaints.  Bowel habits: Patient reports having a normal bowel movement.  Drains (DIANE): N/A  Fevers/chills: Patient denies any fever or chills.  Incisions: Patient denies any signs and symptoms of infection..  Wound closure: Steri-strips  Pain: tolerable, using Tylenol every 12hr which is working well for her.   New Medications: None    Activity/Restrictions  No restrictions    Equipment  None    Pathology reviewed with patient:  Yes   SKIN AND SOFT TISSUE, BACK MASS, EXCISION:  -Benign skin and subcutaneous tissue with scar and foreign body giant cell reaction with embedded small particles.  -No evidence of malignancy identified.    Forms/Letters  No    All of her questions were answered including reviewing restrictions, pathology, and wound care.  She will call this office if she has any further questions and/or concerns.      In lieu of a post-op clinic patient that patient would like to be contacted in approximately 7-10 days via telephone.    A copy of this note was routed to the primary surgeon.      Whom and When to Call  Patient acknowledges understanding of how to manage any medication changes and   when to seek medical care.     Patient advised that if after hour medical concerns arise to please call [phone] and choose option 4 to speak to the physician on call.

## 2021-12-10 ENCOUNTER — E-VISIT (OUTPATIENT)
Dept: URGENT CARE | Facility: URGENT CARE | Age: 66
End: 2021-12-10
Payer: COMMERCIAL

## 2021-12-10 DIAGNOSIS — Z20.822 CLOSE EXPOSURE TO 2019 NOVEL CORONAVIRUS: Primary | ICD-10-CM

## 2021-12-10 PROCEDURE — 99421 OL DIG E/M SVC 5-10 MIN: CPT | Performed by: PHYSICIAN ASSISTANT

## 2021-12-10 NOTE — PATIENT INSTRUCTIONS
"  Dear Hafsa Corona,    Based on your exposure to COVID-19 (coronavirus), we would like to test you for this virus. I have placed an order for this test.The best time for testing is 5-7 days after the exposure.    How to schedule:  Go to your [employer] home page and scroll down to the section that says  You have an appointment that needs to be scheduled  and click the large green button that says  Schedule Now  and follow the steps to find the next available opening.     If you are unable to complete these [employer] scheduling steps, please call [phone] to schedule your testing.     Return to work/school/ guidance:   For people with high risk exposures outside the home    Please let your workplace manager and staffing office know when your quarantine ends.     We can not give you an exact date as it depends on the information below. You can calculate this on your own or work with your manager/staffing office to calculate this. (For example if you were exposed on 10/4, you would have to quarantine for 14 full days. That would be through 10/18. You could return on 10/19.)    Quarantine Guidelines:  Patients (\"contacts\") who have been in close prolonged contact of an infected person(s) (within six feet for at least 15 minutes within a 24 hour period), and remain asymptomatic should enter quarantine based on the following options:    14-day quarantine period (this remains the CDC recommendation for the greatest protection against spread of COVID-19) OR    Minimum 7-day quarantine with negative RT-PCR test collected on day 5 or later OR    10-day quarantine with no test  Quarantine Guideline exceptions are as follows:    People who have been fully vaccinated do not need to quarantine if the exposure was at least 2 weeks after the last vaccination. This includes vaccinated health care workers.    Not fully vaccinated and unvaccinated Individuals who work in health care, congregate care, or congregate living " should be off work for 14 days from their last date of exposure. Community activities for this group can be resumed based on options above. Fully vaccinated individuals in this group do not need to quarantine from work after exposure.    Not fully vaccinated and unvaccinated people whose high-risk exposure was a household member should always quarantine for 14 days from their last date of exposure. Fully vaccinated people in this category do not need to quarantine.    Not fully vaccinated or unvaccinated residents of congregate care and congregate living settings should always quarantine for 14 days from their last date of exposure. Fully vaccinated residents do not need to quarantine.  Note: If you have ongoing exposure to the covid positive person, this quarantine period may be more than 14 days. (For example, if you are continued to be exposed to your child who tested positive and cannot isolate from them, then the quarantine of 7-14 days can't start until your child is no longer contagious. This is typically 10 days from onset of the child's symptoms. So the total duration may be 17-24 days in this case.)    You should continue symptom monitoring until day 14 post-exposure. If you develop signs or symptoms of COVID-19, isolate and get tested (even if you have been tested already).    How to quarantine:   Stay home and away from others. Don't go to school or anywhere else. Generally quarantine means staying home from work but there are some exceptions to this. Please contact your workplace.  No hugging, kissing or shaking hands.  Don't let anyone visit.  Cover your mouth and nose with a mask, tissue or washcloth to avoid spreading germs.  Wash your hands and face often. Use soap and water.    What are the symptoms of COVID-19?  The most common symptoms are cough, fever and trouble breathing. Less common symptoms include headache, body aches, fatigue (feeling very tired), chills, sore throat, stuffy or runny nose,  diarrhea (loose poop), loss of taste or smell, belly pain, and nausea or vomiting (feeling sick to your stomach or throwing up).  After 14 days, if you have still don't have symptoms, you likely don't have this virus.  If you develop symptoms, follow these guidelines.  If you're normally healthy: Please start another eVisit.  If you have a serious health problem (like cancer, heart failure, an organ transplant or kidney disease): Call your specialty clinic. Let them know that you might have COVID-19.    Where can I get more information?  [facility] - About COVID-19: [email or web address]  CDC - What to Do If You're Sick: www.cdc.gov/coronavirus/2019-ncov/about/steps-when-sick.html  CDC - Ending Home Isolation: www.cdc.gov/coronavirus/2019-ncov/hcp/disposition-in-home-patients.html  CDC - Caring for Someone: www.cdc.gov/coronavirus/2019-ncov/if-you-are-sick/care-for-someone.html  [facility] clinical trials (COVID-19 research studies): clinicalaffairs.[facility]-clinical-trials  Below are the COVID-19 hotlines at the [place] Department of [facility] ([facility]). Interpreters are available.  For health questions: Call 881-671-2166 or 1-709.738.9195 (7 a.m. to 7 p.m.)  For questions about schools and childcare: Call 757-296-0787 or 1-179.386.7815 (7 a.m. to 7 p.m.)        December 10, 2021  RE:  Hafsa Corona                                                                                                                   [address]  [place] [address]      To whom it may concern:    I evaluated Hafsa Corona on December 10, 2021. Hafsa Corona should be excused from work/school.    They should let their workplace manager and staffing office know when their quarantine ends.    We can not give an exact date as it depends on the information below. They can calculate this on their own or work with their manager/staffing office to calculate this. (For example if they were exposed on  "10/04, they would have to quarantine for 14 full days. That would be through 10/18. They could return on 10/19.)    Quarantine Guidelines:    Patients (\"contacts\") who have been in close prolonged contact of an infected person(s) (within six feet for at least 15 minutes within a 24 hour period) and remain asymptomatic should enter quarantine based on the following options:      14-day quarantine period (this remains the CDC recommendation for the greatest protection against spread of COVID-19) OR    Minimum 7-day quarantine with negative RT-PCR test collected on day 5 or later OR    10-day quarantine with no test   Quarantine Guideline exceptions are as follows:    People who have been fully vaccinated do not need to quarantine if the exposure was at least 2 weeks after the last vaccination. This includes vaccinated health care workers.    Not fully vaccinated and unvaccinated Individuals who work in health care, congregate care, or congregate living should be off work for 14 days from their last date of exposure. Community activities for this group can be resumed based on options above. Fully vaccinated individuals in this group do not need to quarantine from work after exposure.    Not fully vaccinated and unvaccinated people whose high-risk exposure was a household member should always quarantine for 14 days from their last date of exposure. Fully vaccinated people in this category do not need to quarantine.    Not fully vaccinated or unvaccinated residents of congregate care and congregate living settings should always quarantine for 14 days from their last date of exposure. Fully vaccinated residents do not need to quarantine.    Note: If there is ongoing exposure to the covid positive person, this quarantine period may be longer than 14 days. (For example, if they are continually exposed to their child, who tested positive and cannot isolate from them, then the quarantine of 7-14 days can't start until their " child is no longer contagious. This is typically 10 days from onset to the child's symptoms. So the total duration may be 17-24 days in this case.)    Hafsa Corona should continue symptom monitoring until day 14 post-exposure. If they develop signs or symptoms of COVID-19, they should isolate and get tested (even if they have been tested already).    Sincerely,  Ty Uriostegui PA-C

## 2021-12-13 ENCOUNTER — OFFICE VISIT (OUTPATIENT)
Dept: URGENT CARE | Facility: URGENT CARE | Age: 66
End: 2021-12-13
Payer: COMMERCIAL

## 2021-12-13 VITALS
RESPIRATION RATE: 15 BRPM | HEIGHT: 63 IN | TEMPERATURE: 97.4 F | WEIGHT: 210 LBS | OXYGEN SATURATION: 96 % | DIASTOLIC BLOOD PRESSURE: 82 MMHG | SYSTOLIC BLOOD PRESSURE: 112 MMHG | HEART RATE: 91 BPM | BODY MASS INDEX: 37.21 KG/M2

## 2021-12-13 DIAGNOSIS — J01.40 ACUTE NON-RECURRENT PANSINUSITIS: Primary | ICD-10-CM

## 2021-12-13 DIAGNOSIS — E11.9 TYPE 2 DIABETES MELLITUS WITHOUT COMPLICATION, WITHOUT LONG-TERM CURRENT USE OF INSULIN (H): ICD-10-CM

## 2021-12-13 DIAGNOSIS — R53.83 FATIGUE, UNSPECIFIED TYPE: ICD-10-CM

## 2021-12-13 PROCEDURE — 99214 OFFICE O/P EST MOD 30 MIN: CPT | Performed by: PHYSICIAN ASSISTANT

## 2021-12-13 PROCEDURE — U0003 INFECTIOUS AGENT DETECTION BY NUCLEIC ACID (DNA OR RNA); SEVERE ACUTE RESPIRATORY SYNDROME CORONAVIRUS 2 (SARS-COV-2) (CORONAVIRUS DISEASE [COVID-19]), AMPLIFIED PROBE TECHNIQUE, MAKING USE OF HIGH THROUGHPUT TECHNOLOGIES AS DESCRIBED BY CMS-2020-01-R: HCPCS | Performed by: PHYSICIAN ASSISTANT

## 2021-12-13 PROCEDURE — U0005 INFEC AGEN DETEC AMPLI PROBE: HCPCS | Performed by: PHYSICIAN ASSISTANT

## 2021-12-13 ASSESSMENT — MIFFLIN-ST. JEOR: SCORE: 1456.68

## 2021-12-14 NOTE — PROGRESS NOTES
Fatigue, unspecified type  - Symptomatic COVID-19 Virus (Coronavirus) by PCR Nose  - amoxicillin-clavulanate (AUGMENTIN) 875-125 MG tablet; Take 1 tablet by mouth 2 times daily for 10 days    Type 2 diabetes mellitus without complication, without long-term current use of insulin (H)  Patient was advised to monitor blood sugar levels 3 times daily and follow up with primary care in 1-3 months for any adjustments of diabetes medication. Low carb diet and 20 min of exercise daily also recommended.     Acute non-recurrent pansinusitis  - amoxicillin-clavulanate (AUGMENTIN) 875-125 MG tablet; Take 1 tablet by mouth 2 times daily for 10 days     Age 12 months or more  Okay to use Zarbee's   Okay to use Rx Children Tylenol if prescribed (Dose based on weight)    Age 2-12:   Okay to use Children Motrin or Tylenol over the counter.    Adults:  Okay to take acetaminophen 500 mg- 2 tabs (Total of 1000 mg) every 8 hrs   Okay to take ibuprofen 200 mg- 3 tabs (Total of 600 mg) every 6 hours        Okay to use Neti pot for sinus lavage up to three times daily for congestion and sinus pressure if present. Daily hot shower can be beneficial for congestion and body aches. Okay to use bedroom vaporizer or humidifier if symptoms are worse at night. Nightly Vicks Vapor rub and 5-10 mg of Melatonin okay to use for sleep.     Over the counter cough medication and decongestants okay if not prescribed by me during this visit. For homeopathic alternatives to cough syrup and decongestant, feel free to try Elderberry extract.    Okay to use salt water gargles, warm tea (or warm water with lemon and honey), and lozenges for any throat discomfort. Chloraseptic spray is also highly encourages for throat pain/irritation.     Patient will need to get plenty of rest and drink at least 1.5-2 liters of fluids daily for adults and 1-1.5 liters for children. If vomiting and not tolerating liquids for more than 24 hrs, please go to your nearest  emergency department for IV fluids and further treatment.     Patient is not contagious after 1 week from start of symptoms. If possible, wear mask for first 7 days. Wash hands regularly and vigorously for 30 seconds often.       Bc Andres PA-C  [facility]    Subjective   66 year old who presents to clinic today for the following health issues:    Urgent Care, Covid Concern, and Derm Problem       HPI     Acute Illness  Acute illness concerns: fatigue, sinus pain, and congestion  Onset/Duration: 1-2 weeks. Patient also had exposure to covid-19 recently and wanted to be screened for this.   Symptoms:  Fever: no  Chills/Sweats: no  Headache (location?): no  Sinus Pressure: YES  Conjunctivitis:  no  Ear Pain: no  Rhinorrhea: YES  Congestion: YES  Sore Throat: no  Cough: no  Wheeze: no  Decreased Appetite: no  Nausea: no  Vomiting: no  Diarrhea: no  Dysuria/Freq.: no  Dysuria or Hematuria: no  Fatigue/Achiness: YES  Sick/Strep Exposure: YES  Therapies tried and outcome: Flonase and neti pot.     Review of Systems   Review of Systems   See HPI     Objective    Temp: 97.4  F (36.3  C) Temp src: Temporal BP: 112/82 Pulse: 91   Resp: 15 SpO2: 96 %       Physical Exam   Physical Exam  Constitutional:       General: She is not in acute distress.     Appearance: Normal appearance. She is normal weight. She is not ill-appearing, toxic-appearing or diaphoretic.   HENT:      Head: Normocephalic and atraumatic.      Right Ear: Tympanic membrane, ear canal and external ear normal. There is no impacted cerumen.      Left Ear: Tympanic membrane, ear canal and external ear normal. There is no impacted cerumen.      Nose: Congestion and rhinorrhea present.      Right Sinus: Maxillary sinus tenderness and frontal sinus tenderness present.      Left Sinus: Maxillary sinus tenderness and frontal sinus tenderness present.   Cardiovascular:      Rate and Rhythm: Normal rate and regular rhythm.      Pulses: Normal  pulses.      Heart sounds: Normal heart sounds. No murmur heard.  No friction rub. No gallop.    Pulmonary:      Effort: Pulmonary effort is normal. No respiratory distress.      Breath sounds: Normal breath sounds. No stridor. No wheezing, rhonchi or rales.   Chest:      Chest wall: No tenderness.   Neurological:      General: No focal deficit present.      Mental Status: She is alert and oriented to person, place, and time. Mental status is at baseline.      Gait: Gait normal.   Psychiatric:         Mood and Affect: Mood normal.         Behavior: Behavior normal.         Thought Content: Thought content normal.         Judgment: Judgment normal.          No results found for this or any previous visit (from the past 24 hour(s)).

## 2021-12-15 ENCOUNTER — PATIENT OUTREACH (OUTPATIENT)
Dept: SURGERY | Facility: CLINIC | Age: 66
End: 2021-12-15
Payer: COMMERCIAL

## 2021-12-15 LAB — SARS-COV-2 RNA RESP QL NAA+PROBE: NEGATIVE

## 2021-12-15 NOTE — PROGRESS NOTES
Hafsa Corona is a patient of Dr. Norris Vela that recently underwent a surgical procedure.  The patient was contacted via telephone approximately 7-10 days ago for a status update and post-op teaching.  In lieu of a clinic visit, the patient requested to be contacted at a later date by an RN for an assessment.    Attempted to contact patient via telephone for a status update.  LM on VM to call office.

## 2021-12-16 NOTE — PROGRESS NOTES
Attempted to contact patient x 2 for 7-10 day telephone call/status update.  LM on VM to call office-contact information provided.    ADDENDUM  12/17/21  7:50 AM  Patient has not returned phone call. Encounter closed.

## 2022-01-12 ENCOUNTER — MYC MEDICAL ADVICE (OUTPATIENT)
Dept: FAMILY MEDICINE | Facility: CLINIC | Age: 67
End: 2022-01-12
Payer: COMMERCIAL

## 2022-01-13 ENCOUNTER — TELEPHONE (OUTPATIENT)
Dept: FAMILY MEDICINE | Facility: CLINIC | Age: 67
End: 2022-01-13
Payer: COMMERCIAL

## 2022-01-13 ENCOUNTER — APPOINTMENT (OUTPATIENT)
Dept: URGENT CARE | Facility: CLINIC | Age: 67
End: 2022-01-13
Payer: COMMERCIAL

## 2022-01-13 DIAGNOSIS — J01.40 ACUTE NON-RECURRENT PANSINUSITIS: ICD-10-CM

## 2022-01-13 DIAGNOSIS — R53.83 FATIGUE, UNSPECIFIED TYPE: ICD-10-CM

## 2022-01-13 NOTE — TELEPHONE ENCOUNTER
Patient sent a My Chart yesterday as well and received response to do an e-visit.  States she started the Augmentin prescribed to her in December and is feeling much better.  It was only a 10 day course and she will run out after the Sunday morning dose.  Patient is asking for a refill as in the past she has always needed 2 to 3 weeks to get totally rid of the infection.  Please advise.  Mago Rodriguez RN  [facility]

## 2022-01-17 NOTE — TELEPHONE ENCOUNTER
Notified patient that refill was sent in    TIMUR SpainN RN  [facility]

## 2022-01-17 NOTE — TELEPHONE ENCOUNTER
Relayed message to patient via [facility]- see 1/12/22 myChart encounter    TIMUR SpainN RN  [facility]

## 2022-01-24 DIAGNOSIS — Z86.718 PERSONAL HISTORY OF DVT (DEEP VEIN THROMBOSIS): ICD-10-CM

## 2022-01-31 ENCOUNTER — TRANSFERRED RECORDS (OUTPATIENT)
Dept: HEALTH INFORMATION MANAGEMENT | Facility: CLINIC | Age: 67
End: 2022-01-31
Payer: COMMERCIAL

## 2022-02-12 ENCOUNTER — HEALTH MAINTENANCE LETTER (OUTPATIENT)
Age: 67
End: 2022-02-12

## 2022-02-28 ENCOUNTER — MYC MEDICAL ADVICE (OUTPATIENT)
Dept: FAMILY MEDICINE | Facility: CLINIC | Age: 67
End: 2022-02-28
Payer: COMMERCIAL

## 2022-02-28 DIAGNOSIS — R53.83 FATIGUE, UNSPECIFIED TYPE: ICD-10-CM

## 2022-02-28 DIAGNOSIS — J01.40 ACUTE NON-RECURRENT PANSINUSITIS: ICD-10-CM

## 2022-03-01 NOTE — TELEPHONE ENCOUNTER
Melly,    Pt again asking for 3 weeks augmentin for sinus infection. You gave her rx by message in January for this. Do you want e visit or virtual visit this time?    Erin Orozco, RN, BSN  [facility]

## 2022-03-08 DIAGNOSIS — E78.5 HYPERLIPIDEMIA LDL GOAL <100: ICD-10-CM

## 2022-03-09 NOTE — TELEPHONE ENCOUNTER
Routing refill request to provider for review/approval because:  Labs not current:  LDL    LDL Cholesterol Calculated   Date Value Ref Range Status   12/01/2020 69 <100 mg/dL Final     Comment:     Desirable:       <100 mg/dl     Last visit 8/4/21.    Jazzy Garces RN  [facility]

## 2022-03-10 RX ORDER — ATORVASTATIN CALCIUM 80 MG/1
TABLET, FILM COATED ORAL
Qty: 90 TABLET | Refills: 0 | Status: SHIPPED | OUTPATIENT
Start: 2022-03-10 | End: 2022-07-06

## 2022-03-11 DIAGNOSIS — E11.9 TYPE 2 DIABETES MELLITUS WITHOUT COMPLICATION, WITHOUT LONG-TERM CURRENT USE OF INSULIN (H): ICD-10-CM

## 2022-03-11 RX ORDER — METFORMIN HCL 500 MG
TABLET, EXTENDED RELEASE 24 HR ORAL
Qty: 60 TABLET | Refills: 0 | Status: SHIPPED | OUTPATIENT
Start: 2022-03-11 | End: 2022-05-09

## 2022-03-11 NOTE — TELEPHONE ENCOUNTER
Call patient to schedule diabetes visit.    Danielle Avila MD  [facility]  [phone]

## 2022-03-11 NOTE — TELEPHONE ENCOUNTER
Routing refill request to provider for review/approval because:  Labs not current:     Patient has documented A1c within the specified period of time.    Recent (6 mo) or future (30 days) visit within the authorizing provider's specialty   Break in medication    Last Written Prescription Date:  12/23/20  Last Fill Quantity: 90 days,  # refills: 1   Last office visit: 8/4/2021 with prescribing provider:  Nany  Future Office Visit:   Next 5 appointments (look out 90 days)    Apr 06, 2022  2:40 PM  (Arrive by 2:25 PM)  Return Visit with MARLON Perez CNP  [facility] ([facility] ) [address] [place] [id number] [phone]           Mary Jo Flowers RN  [facility]

## 2022-03-25 DIAGNOSIS — Z86.718 PERSONAL HISTORY OF DVT (DEEP VEIN THROMBOSIS): ICD-10-CM

## 2022-04-01 ENCOUNTER — LAB (OUTPATIENT)
Dept: LAB | Facility: CLINIC | Age: 67
End: 2022-04-01
Attending: OBSTETRICS & GYNECOLOGY
Payer: COMMERCIAL

## 2022-04-01 DIAGNOSIS — C56.9 MALIGNANT NEOPLASM OF OVARY, UNSPECIFIED LATERALITY (H): Primary | ICD-10-CM

## 2022-04-01 LAB — CANCER AG125 SERPL-ACNC: 13 U/ML (ref 0–30)

## 2022-04-01 PROCEDURE — 36415 COLL VENOUS BLD VENIPUNCTURE: CPT

## 2022-04-01 PROCEDURE — 86304 IMMUNOASSAY TUMOR CA 125: CPT

## 2022-04-01 NOTE — NURSING NOTE
Chief Complaint   Patient presents with     Labs Only     Labs drawn via  by RN in lab       Labs collected from venipuncture by RN.     Deirdre Wang RN

## 2022-04-06 ENCOUNTER — ANCILLARY PROCEDURE (OUTPATIENT)
Dept: MRI IMAGING | Facility: CLINIC | Age: 67
End: 2022-04-06
Attending: CLINICAL NURSE SPECIALIST
Payer: COMMERCIAL

## 2022-04-06 DIAGNOSIS — Z12.39 BREAST CANCER SCREENING, HIGH RISK PATIENT: ICD-10-CM

## 2022-04-06 DIAGNOSIS — R92.30 DENSE BREAST: ICD-10-CM

## 2022-04-06 PROCEDURE — A9585 GADOBUTROL INJECTION: HCPCS | Performed by: STUDENT IN AN ORGANIZED HEALTH CARE EDUCATION/TRAINING PROGRAM

## 2022-04-06 PROCEDURE — 77049 MRI BREAST C-+ W/CAD BI: CPT | Performed by: STUDENT IN AN ORGANIZED HEALTH CARE EDUCATION/TRAINING PROGRAM

## 2022-04-06 RX ORDER — GADOBUTROL 604.72 MG/ML
10 INJECTION INTRAVENOUS ONCE
Status: COMPLETED | OUTPATIENT
Start: 2022-04-06 | End: 2022-04-06

## 2022-04-06 RX ADMIN — GADOBUTROL 9.5 ML: 604.72 INJECTION INTRAVENOUS at 16:49

## 2022-04-06 NOTE — PROGRESS NOTES
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 She completed treatment with surgery and three cycles of chemotherapy on 10/16/17. She is here today for follow up and surveillance.    Oncology History:  The patient has had a recent episode of lower abdominal pain in early July.  She was subsequently sent to the emergency room and was found to have a large 9 cm complex ovarian mass. She was admitted to the hospital for pain control.  7/3/17:  1187  17: Exploratory laparotomy, total abdominal hysterectomy, left salpingo-oophorectomy, cancer staging including infracolic omentectomy, bilateral pelvic & para-aortic lymphadenectomy, peritoneal biopsies, evacuation of pelvic fluid by Dr. Cole.    FINAL DIAGNOSIS:   A. LEFT OVARY AND FALLOPIAN TUBE, LEFT SALPINGO-OOPHORECTOMY:   - Ovarian clear cell carcinoma   - Background of endometriosis   - Fallopian tube with no significant histologic abnormality.   B. UTERUS, HYSTERECTOMY:   -  Inactive endometrium   -  Myometrium with adenomyosis and leiomyoma.   -  Cervix with squamous metaplasia.   C. PERITONEUM, RIGHT PELVIS, BIOPSY:   - Fibroadipose tissue, negative for malignancy.   D. LYMPH NODES, RIGHT PELVIC, DISSECTION:   - Thirteen benign lymph nodes (0/13).   E. LYMPH NODES, LEFT PELVIC, DISSECTION:   - Seven benign lymph nodes (0/7).   F. PERITONEUM, LEFT PELVIS, BIOPSY:   - Fibroadipose tissue, negative for malignancy.   G. PERITONEUM, BLADDER, BIOPSY:   - Fibroadipose tissue  with acute and chronic inflammation, negative for malignancy.   H. PERITONEUM, POSTERIOR CUL-DE-SAC, BIOPSY:   - Fibroadipose tissue, negative for malignancy.   I. PERITONEUM, LEFT PARACOLIC GUTTER, BIOPSY:   - Fibroadipose tissue, negative for malignancy.   J. LYMPH NODES, LEFT PARA-AORTIC, DISSECTION:   -  Five benign lymph nodes (0/5).   K. LYMPH NODES, RIGHT PARA-AORTIC, DISSECTION:   - Two benign lymph nodes (0/2).   L. PERITONEUM, RIGHT PARACOLIC GUTTER, BIOPSY:   - Fibroadipose tissue, negative for malignancy.   M. OMENTUM, OMENTECTOMY:   - Adipose tissue with reactive changes, negative for malignancy.   COMMENT:   The final diagnosis confirms the interpretation provided intraoperatively.   Report Name: Ovary or Fallopian Tube   Status: Submitted   Part(s) Involved:   A: Ovary and fallopian tube, left   Synoptic Report:   CLINICAL   Clinical History:   - Pelvic mass   SPECIMEN   Procedure:   - Left salpingo-oophorectomy   - Hysterectomy   - Omentectomy   - Peritoneal  biopsies   Lymph Node Sampling:   - Performed   Location:   - Pelvic lymph nodes   - Para-aortic lymph nodes   Specimen Integrity:   - Left ovary   Specimen Integrity of Left Ovary:   - Capsule intact   TUMOR   Primary Tumor Site(s):   - Left ovary   Left Ovary   Tumor Size of Left Ovary: 19 cm   Histologic Type:   - Clear cell carcinoma   Tumor Extent   Ovarian Surface Involvement:   - Absent   Specimen(s)   Extent of Left Ovary:   - Involved   Extent of Left Fallopian Tube:   - Not involved   Extent of Omentum:   - Not involved   Extent of Uterus:   - Not involved   Extent of Peritoneum:   - Not involved   Peritoneal Ascitic Fluid:   - Not performed / unknown   Pleural Fluid:   - Not performed / unknown   LYMPH NODES     Number of Pelvic Lymph Nodes Examined: 20     Number of Pelvic Lymph Nodes Involved: None identified     Number of Para-aortic Lymph Nodes Examined: 7     Number of Para-Aortic Lymph Nodes Involved: None identified   STAGE (PTNM, AJCC 7TH ED.)   Primary Tumor (pT):   - Ovary   Ovarian Primary Tumor (pT):   - pT1a: Tumor limited to 1 ovary; capsule intact, no tumor on ovarian surface. No malignant cells in ascites or peritoneal washings#   Regional Lymph Nodes (pN):   - pN0: No regional lymph node metastasis       07/31/17:   Edmund follow up: Discussed with the patient that given the high risk histology, as well as ruptured mass before surgery, she would be qualified at higher risk of recurrence despite early stage ovarian cancer.  Recommended adjuvant chemotherapy. Plan for carboplatin of AUC of 6 and paclitaxel of 175, m2 for 3 cycles. Referral for genetic counselor and will see her back after those 3 cycles  08/03/17: Call from patient stating she is going for a second opinion and would like chemotherapy rescheduled to week of 8/14/17.      08/16/17: Cycle #1 Carbo/Taxol.  73. Significant paclitaxel reaction.  08/30/17: C1 carboplatin/inpatient paclitaxel desensitization.   09/25/17: C2 carboplatin/paclitaxel- inpatient paclitaxel desensitization.  15.  10/16/17: C3 carboplatin/paclitaxel- switched to docetaxel given her neuropathy.  10.  11/14/17:  8. CT CAP:  IMPRESSION:   1. Post surgical changes of hysterectomy and bilateral  salpingo-oophorectomy. Nodular soft tissue density in the posterior  cul-de-sac along with ill-defined nodular thickening in the left  pelvis, suspicious for residual disease or metastatic deposits.   2. There is a 3 cm mass in the superior pole of the right kidney,  possibly extending into the renal hilum. Represents RCC until proven  otherwise. Consider renal mass protocol CT to assess renal vein  involvement.  3. Stable sub-4 mm pulmonary nodules, continued attention on  follow-up.     11/16/17: CT renal mass protocol  IMPRESSION:  1. Arterially enhancing mass measuring 3.6 x 2.1 x 2.2 cm mass arising  from superior posterior of the right kidney, this is renal cell  carcinoma until proven otherwise.  2. Stable fat-containing umbilical hernia.     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 Margins negative. Three month surveillance plan with [facility].     2/26/18:  14     5/22/18:  11      8/23/18:  11     12/12/18:   10     3/8/19:  12     6/19/19:  13     9/23/19:  12    11/8/19: CT Chest:   IMPRESSION:   1. Stable, sub-4 mm pulmonary nodules as above.  2. Stable hypodense, subcentimeter nodules in the thyroid.     3/27/20:  14    10/1/20:  15    3/29/21:  10    7/18/21 CT A/P for abdominal pain in ER: IMPRESSION:   1.  No acute process in the abdomen or pelvis.  2.  Colonic diverticulosis without evidence for diverticulitis.  3.  Post right nephrectomy, hysterectomy, and oophorectomy    10/5/2021:  13  4/1/22:  13        Today Hafsa comes to the clinic without any gynecological concerns. She has met with urology for incontinence and completed pelvic floor PT. She might be interested in an operation in the future as she has not found PT to be overall helpful. The urinary incontinence is manageable with pads right now. She is sexually active with her boyfriend without pain or bleeding. She is on Xarelto daily for hx of DVT. She denies any vaginal bleeding, no changes in her bowel or bladder habits, no nausea/emesis, and no difficulties eating. She denies any abdominal discomfort/bloating, no fevers or chills, and no chest pain or shortness of breath. She up to date on breast screening (high risk screening through [facility]).  She is up to date on her colonoscopy (1/6/20) and mammogram. Will be due for DEXA scan. She had her portacath removed.            ROS: 10 point ROS neg other than the symptoms noted above in the HPI.      Past Medical History:    Past Medical History:   Diagnosis Date     Anxiety state, unspecified     [id number]     Arthritis 2014    vague, gradual, not treated really, knees feel it     At high risk for breast cancer 09/27/2019    30.3% lifetime risk by SHERON model     Attention deficit disorder without mention of hyperactivity      Cancer (H) 7/2017 and 1/2018    ovarian and kidney cancers, both treated well     Chronic rhinitis      Depressive  disorder lifetime    plus Anxiety plus ADD. Escitalipram, therapy, ADD meds maybe     Heart disease 1999    tachycardia and high cholesterol, managed     History of blood transfusion 07/2017    while in hospital for ovarian cancer     Hypertension 1999    tho BP was too LOW last week. past 12 years Generally OK     Panic disorder without agoraphobia      Pure hypercholesterolemia     Lipitor     Tachycardia, unspecified     9/1999-2/2004     Type II or unspecified type diabetes mellitus without mention of complication, not stated as uncontrolled     diagnosed 2004         Past Surgical History:    Past Surgical History:   Procedure Laterality Date     ABSCESS DRAINAGE Left     left leg      AS REMV KIDNEY,RADICAL Right      BIOPSY  7/2017 and 2/2018    ovarian and kidney cancer biopsies. also 1987 breast benign     BREAST CYST EXCISION  1985     BREAST SURGERY  1987    date unsure. benign breast cyst removed     CATARACT IOL, RT/LT Left 05/18/2018     CATARACT IOL, RT/LT Right 07/1318     COLONOSCOPY  2008 and 2013    polyps removed. Next due fall 2018     ENDOMETRIAL ABLATION  2008     EXCISE MASS BACK N/A 12/6/2021    Procedure: EXCISION, MASS, BACK;  Surgeon: Norris eVla MD;  Location: [facility] OR     HYSTERECTOMY TOTAL ABDOMINAL, BILATERAL SALPINGO-OOPHORECTOMY, NODE DISSECTION, COMBINED Left 7/7/2017    Procedure: COMBINED HYSTERECTOMY TOTAL ABDOMINAL, SALPINGO-OOPHORECTOMY, NODE DISSECTION;  Exploratory Laparotomy, Left Salpingo-Oophorectomy, Cancer Staging, Total Hysterectomy, Omentectomy, Evacuation of abdominal fluid, Lymph Node Dissection  Anesthesia Block ;  Surgeon: Serena Cole MD;  Location: [facility] OR     HYSTERECTOMY, PAP NO LONGER INDICATED  07/07/2017    Laparotomy; for ovarian cancer staging     HYSTERECTOMY, PAP NO LONGER INDICATED       INSERT PORT VASCULAR ACCESS Right 8/21/2017    Procedure: INSERT PORT VASCULAR ACCESS;  Single Lumen Chest Power Port;  Surgeon: Stephen Mike  KERRI Roberts;  [facility]     IR PORT REMOVAL RIGHT  11/12/2021     LYMPHADENECTOMY RETROPERITONEAL Bilateral 07/07/2017    Laparotomy; pelvics & para-aortics; for ovarian cancer staging     OMENTECTOMY  07/07/2017    Laparotomy; for ovarian cancer staging     ORTHOPEDIC SURGERY  1/2002    broken left jaw due to fall, 4 fractures, titanium plates     OTHER SURGICAL HISTORY  01/2002    OTHER SURGICAL HISTORYfacial surgery due to fall      PHACOEMULSIFICATION CLEAR CORNEA WITH STANDARD INTRAOCULAR LENS IMPLANT Right 7/13/2018    Procedure: PHACOEMULSIFICATION CLEAR CORNEA WITH STANDARD INTRAOCULAR LENS IMPLANT;  RIght Eye Phacoemulsification Clear Cornea with Standard Intraocular Lens Placement ;  Surgeon: Mary Jo Massey MD;  [facility]     PHACOEMULSIFICATION CLEAR CORNEA WITH TORIC INTRAOCULAR LENS IMPLANT Left 5/18/2018    Procedure: PHACOEMULSIFICATION CLEAR CORNEA WITH TORIC INTRAOCULAR LENS IMPLANT;  Left Eye Phacoemulsification with Toric Lens;  Surgeon: Mary Jo Massey MD;  [facility]     REMOVE PORT VASCULAR ACCESS Right 11/12/2021    Procedure: REMOVAL, VASCULAR ACCESS PORT right;  Surgeon: Afsaneh Das PA-C;  Location: [facility] OR     SALPINGO OOPHORECTOMY,R/L/KAYY Right 2008    Salpingo Oophorectomy, RT     SALPINGO OOPHORECTOMY,R/L/KAYY Left 07/07/2017    Laparotomy; for ovarian cancer staging     SALPINGOOPHORECTOMY  2008     SURGICAL HISTORY OF -       facial surgery d/t fall in 1/2002     SURGICAL HISTORY OF -       1985 removal of breast cyst     SURGICAL HISTORY OF -   2008    endometrial ablation     YAG CAPSULOTOMY OD (RIGHT EYE)  10/22/2018         Health Maintenance Due   Topic Date Due     ZOSTER IMMUNIZATION (1 of 2) Never done     EYE EXAM  11/28/2019     DIABETIC FOOT EXAM  10/01/2020     MEDICARE ANNUAL WELLNESS VISIT  12/01/2021     LIPID  12/01/2021     MICROALBUMIN  12/01/2021     FALL RISK ASSESSMENT  12/01/2021     PHQ-9  12/25/2021     A1C  02/04/2022       Current  Medications:     Current Outpatient Medications   Medication Sig Dispense Refill     Acetaminophen (TYLENOL PO) Take 500 mg by mouth 2 tabs prn pain (monthly)       atorvastatin (LIPITOR) 80 MG tablet TAKE 1 TABLET(80 MG) BY MOUTH DAILY 90 tablet 0     B Complex Vitamins (VITAMIN B-COMPLEX PO) Take 50 mg by mouth        CALCIUM-MAGNESIUM-VITAMIN D PO Take 2 tablets by mouth daily       Cholecalciferol (VITAMIN D) 1000 UNIT capsule Take 2,000 Units by mouth daily        DiphenhydrAMINE HCl (BENADRYL PO) Take 50 mg by mouth daily as needed (monthly)       EPINEPHrine (EPIPEN/ADRENACLICK/OR ANY BX GENERIC EQUIV) 0.3 MG/0.3ML injection 2-pack Inject 0.3 mLs (0.3 mg) into the muscle once as needed for anaphylaxis 0.3 mL PRN     escitalopram (LEXAPRO) 20 MG tablet Take 1 tablet (20 mg) by mouth daily 90 tablet 1     fluticasone (FLONASE) 50 MCG/ACT nasal spray SHAKE LIQUID AND USE 2 SPRAYS IN EACH NOSTRIL DAILY 48 g 3     Magnesium Hydroxide (MAGNESIA PO) Take 500 mg by mouth       metFORMIN (GLUCOPHAGE-XR) 500 MG 24 hr tablet TAKE 2 TABLETS BY MOUTH EVERY DAY WITH THE EVENING MEAL 60 tablet 0     Multiple Vitamins-Minerals (MULTIVITAMIN ADULT PO) Take 1 tablet by mouth daily       nitroFURantoin macrocrystal-monohydrate (MACROBID) 100 MG capsule TAKE 1 CAPSULE BY MOUTH AFTER INTERCOURSE AS NEEDED 30 capsule PRN     nystatin (MYCOSTATIN) 812407 UNIT/GM external powder Apply topically 2 times daily as needed Apply to folds under breast and abdomen. 60 g 1     Probiotic Product (PRO-BIOTIC BLEND PO) Take 1 capsule by mouth daily Enzymatic Therapy-probiotic pearls       rivaroxaban ANTICOAGULANT (XARELTO) 20 MG TABS tablet Take 1 tablet (20 mg) by mouth daily (with dinner) 30 tablet 3     tamoxifen (NOLVADEX) 10 MG tablet Cut each pill in 1/2 with pill cutter to ensure 5 mg daily dose. 45 tablet 3     traZODone (DESYREL) 50 MG tablet TAKE 1 TABLET  BY MOUTH EVERY NIGHT AS NEEDED FOR SLEEP 180 tablet PRN     fluconazole  (DIFLUCAN) 150 MG tablet Take 1 tablet (150 mg) by mouth daily Take additional tablet 72 hours after first if symptoms persist. (Patient not taking: Reported on 2022) 2 tablet 1     LORazepam (ATIVAN) 1 MG tablet Take 1 tablet (1 mg) by mouth every 6 hours as needed (nausea/vomiting, anxiety or sleep) (Patient not taking: Reported on 2022) 30 tablet 1         Allergies:        Allergies   Allergen Reactions     Paclitaxel Anaphylaxis and Difficulty breathing     Wasps [Hornets] Anaphylaxis     Yellow Hornet Venom [Hornet Venom] Anaphylaxis and Swelling     Yellow Jacket Venom [Venomil Honey Bee] Anaphylaxis and Swelling     Sulfa Drugs Hives and Rash        Social History:     Social History     Tobacco Use     Smoking status: Never Smoker     Smokeless tobacco: Never Used   Substance Use Topics     Alcohol use: Yes     Comment: Very infrequent, a bit of wine or beer 2x per month maybe       History   Drug Use     Types: Marijuana     Comment: infrequent, for celebrations only, maybe 8x per year         Family History:     The patient's family history is notable for:     Family History   Problem Relation Age of Onset     C.A.D. Mother      Hypertension Mother      Breast Cancer Mother      Psychotic Disorder Mother      Colon Cancer Mother      Cancer Mother         Bone cancer     Coronary Artery Disease Mother      Hyperlipidemia Mother         treated w. statins     Other Cancer Mother         bone/marrow, ,  of this     Depression Mother      Anxiety Disorder Mother      Mental Illness Mother         various undiagnosed types, but THERE     Obesity Mother      Bone Cancer Mother      Other - See Comments Mother         psychotic disease      C.A.D. Father      Diabetes Father      Hypertension Father      Cerebrovascular Disease Father         1984 or      Psychotic Disorder Father      Pancreatic Cancer Father      Coronary Artery Disease Father      Hyperlipidemia Father         treated  w statins     Other Cancer Father         pancreatic, ,  of this     Depression Father      Mental Illness Father         likely PTSD and depression     Obesity Father      Other - See Comments Father         cerebrovascular disease, psychotic disease      Psychotic Disorder Sister         x2     Neurologic Disorder Sister      Hypertension Sister      Hyperlipidemia Sister         treated w statins     Depression Sister      Anxiety Disorder Sister      Obesity Sister      Rashes/Skin Problems Sister         precancerous lesion on leg     Psychotic Disorder Sister      Other - See Comments Sister         small kidney stone      Hypertension Sister      Hyperlipidemia Sister         treated w statins     Depression Sister      Anxiety Disorder Sister      Other - See Comments Sister         psychotic disease      Prostate Problems Brother         cleared      Hypertension Brother      Hyperlipidemia Brother         unsure if treated w statins     Depression Brother      Anxiety Disorder Brother      Mental Illness Brother         undiagnosed but THERE     Obesity Brother      Psychotic Disorder Brother         x2     Depression Brother         major depressive disorder and OCD     Anxiety Disorder Brother      Mental Illness Brother         not sure of diagnoses, treated     Hypertension Brother      Hyperlipidemia Brother      Prostate Cancer Brother      Depression Brother      Anxiety Disorder Brother      Other - See Comments Brother         psychotic disease      Psychotic Disorder Maternal Grandmother         ?     Coronary Artery Disease Maternal Grandmother          of heart attack age 84?     Depression Maternal Grandmother      Psychotic Disorder Maternal Grandfather         ?     Psychotic Disorder Paternal Grandmother         Schizophernia     Coronary Artery Disease Paternal Grandmother          of heart attack, age 85?     Depression Paternal Grandmother         severe, but recovered      Mental Illness Paternal Grandmother         possible bipolar or other     Schizophrenia Paternal Grandmother      Psychotic Disorder Paternal Grandfather         ?     Respiratory Paternal Grandfather          of emphysema and smoking     Emphysema Paternal Grandfather      Cancer - colorectal No family hx of      Glaucoma No family hx of      Macular Degeneration No family hx of          Physical Exam:     /73   Pulse 91   Temp 98.3  F (36.8  C) (Oral)   Resp 18   Wt 98.7 kg (217 lb 11.2 oz)   LMP 2009   SpO2 98%   BMI 38.56 kg/m    Body mass index is 38.56 kg/m .    General Appearance: healthy and alert, no distress     HEENT: no thyromegaly, no palpable nodules or masses       Cardiovascular: regular rate and rhythm, no gallops, rubs or murmurs     Respiratory: lungs clear, no rales, rhonchi or wheezes    Musculoskeletal: extremities non tender without edema    Skin:  no lesions or rashes; lower left back with dressing applied     Neurological: normal gait, no gross defects     Psychiatric: appropriate mood and affect                               Hematological: normal cervical, supraclavicular and inguinal lymph nodes     Gastrointestinal:       abdomen soft, non-tender, non-distended, no organomegaly or masses    Genitourinary: External genitalia and urethral meatus appears normal. Vagina is smooth without nodularity or masses. Cervix surgically absent.  Bimanual exam reveal no masses, nodularity or fullness. Recto-vaginal exam confirms these findings.       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 She completed treatment with surgery and three cycles of chemotherapy on 10/16/17. She is here today for follow up and surveillance.    30 minutes spent on the date of the encounter doing chart review, history and exam, documentation, and further activities as noted above.          Plan:     1.)         ASHISH on exam.  Continue surveillance every six months x3 years (through 10/2022) then annually thereafter with  and pelvic/rectal exam. Message sent to ALLYSON Mcadams to discuss with Dr. Shaffer the patient's wishes for every 1-2 year imaging and continued every 6 month exams. Reviewed signs and symptoms for when she should contact the clinic or seek additional care. Patient to contact the clinic with any questions or concerns in the interim.     2.) Genetic testing: Hafsa is negative for mutations in the AIP, ALK, APC, COSTA, BAP1, BARD1, BLM, BRCA1, BRCA2, BRIP1, BMPR1A, CDH1, CDK4, CDKN1B, CDKN2A, CHEK2, DICER1, EPCAM, FANCC, FH, FLCN, GALNT12, GREM1, HOXB13, MAX, MEN1, MET, MITF, MLH1, MRE11A, MSH2, MSH6, MUTYH, NBN, NF1, NF2, PALB2, PHOX2B, PMS2, POLD1, POLE, POT1, SIMBB2J, PTCH1, PTEN, RAD50, RAD51C, RAD51D, RB1, RET, SDHA, SDHAF2, SDHB, SDHC, SDHD, SMAD4, SMARCA4, SMARCB1, SMARCE1, STK11, SUFU, PMWT471, TP53, TSC1, TSC2, VHL, and XRCC2 genes. No mutations were found in any of the 67 genes analyzed. High risk breast screening. Sees [facility]. On low dose tamoxifen since 10/10/2019 for prevention of breast cancer.     3.) Labs and/or tests ordered include:   (reviewed)     4.) Health maintenance issues addressed today include annual health maintenance and non-gynecologic issues with PCP. Encouraged DEXA scan.     5.)        Lung nodules: stable for two years on CT considered statistically benign and no further imaging needed unless new symptoms.     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 Mercado with Urology. Urinary incontinence: stress and urge. Considering surgery.           MARLON Bradshaw, NP-BC  Women's Health Nurse Practitioner  Division of Gynecologic Oncology  [facility]          CC  Patient Care Team:  Morelia Ayon NP as PCP - Karla Perez RN as Continuity Care Coordinator (Gynecologic Oncology)  Nabeel Dcik  MD MITALI as MD (Urology)  Javier Gregorio MD as MD (Urology)  Lola Vasquez, PhD LP as Psychologist (Psychology)  Morelia Ayon, NP as Assigned PCP  Leland Mercado MD as MD (Urology)  Dayanna Clayton, RN as Registered Nurse (Oncology)  Iris Dalton, APRN CNP as Assigned Cancer Care Provider  Norris Vela MD as MD (Critical Care)  Shawanda Connelly, RN as Registered Nurse  Norris Vela MD as Assigned Surgical Provider

## 2022-04-07 ENCOUNTER — ONCOLOGY VISIT (OUTPATIENT)
Dept: ONCOLOGY | Facility: CLINIC | Age: 67
End: 2022-04-07
Attending: OBSTETRICS & GYNECOLOGY
Payer: COMMERCIAL

## 2022-04-07 VITALS
OXYGEN SATURATION: 98 % | WEIGHT: 217.7 LBS | BODY MASS INDEX: 38.56 KG/M2 | RESPIRATION RATE: 18 BRPM | SYSTOLIC BLOOD PRESSURE: 108 MMHG | DIASTOLIC BLOOD PRESSURE: 73 MMHG | TEMPERATURE: 98.3 F | HEART RATE: 91 BPM

## 2022-04-07 DIAGNOSIS — C56.2 OVARIAN CANCER, LEFT (H): Primary | ICD-10-CM

## 2022-04-07 PROCEDURE — G0463 HOSPITAL OUTPT CLINIC VISIT: HCPCS

## 2022-04-07 PROCEDURE — 99214 OFFICE O/P EST MOD 30 MIN: CPT | Performed by: OBSTETRICS & GYNECOLOGY

## 2022-04-07 ASSESSMENT — PAIN SCALES - GENERAL: PAINLEVEL: NO PAIN (0)

## 2022-04-07 NOTE — LETTER
2022         RE: Hafsa Corona  [address]  [place] [address]        Dear Colleague,    Thank you for referring your patient, Hafsa Corona, to the [facility]. Please see a copy of my visit note below.                    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 She completed treatment with surgery and three cycles of chemotherapy on 10/16/17. She is here today for follow up and surveillance.    Oncology History:  The patient has had a recent episode of lower abdominal pain in early July.  She was subsequently sent to the emergency room and was found to have a large 9 cm complex ovarian mass. She was admitted to the hospital for pain control.  7/3/17:  1187  17: Exploratory laparotomy, total abdominal hysterectomy, left salpingo-oophorectomy, cancer staging including infracolic omentectomy, bilateral pelvic & para-aortic lymphadenectomy, peritoneal biopsies, evacuation of pelvic fluid by Dr. Cole.    FINAL DIAGNOSIS:   A. LEFT OVARY AND FALLOPIAN TUBE, LEFT SALPINGO-OOPHORECTOMY:   - Ovarian clear cell carcinoma   - Background of endometriosis   - Fallopian tube with no significant histologic abnormality.   B. UTERUS, HYSTERECTOMY:   -  Inactive endometrium   -  Myometrium with adenomyosis and leiomyoma.   -  Cervix with squamous metaplasia.   C. PERITONEUM, RIGHT PELVIS, BIOPSY:   - Fibroadipose tissue, negative for malignancy.   D. LYMPH NODES, RIGHT PELVIC, DISSECTION:   - Thirteen benign lymph nodes (0/13).   E. LYMPH NODES, LEFT PELVIC, DISSECTION:   - Seven benign lymph nodes (0/7).   F. PERITONEUM, LEFT PELVIS, BIOPSY:   - Fibroadipose tissue, negative for malignancy.   G. PERITONEUM, BLADDER, BIOPSY:   - Fibroadipose tissue  with acute and chronic inflammation, negative for  malignancy.   H. PERITONEUM, POSTERIOR CUL-DE-SAC, BIOPSY:   - Fibroadipose tissue, negative for malignancy.   I. PERITONEUM, LEFT PARACOLIC GUTTER, BIOPSY:   - Fibroadipose tissue, negative for malignancy.   J. LYMPH NODES, LEFT PARA-AORTIC, DISSECTION:   - Five benign lymph nodes (0/5).   K. LYMPH NODES, RIGHT PARA-AORTIC, DISSECTION:   - Two benign lymph nodes (0/2).   L. PERITONEUM, RIGHT PARACOLIC GUTTER, BIOPSY:   - Fibroadipose tissue, negative for malignancy.   M. OMENTUM, OMENTECTOMY:   - Adipose tissue with reactive changes, negative for malignancy.   COMMENT:   The final diagnosis confirms the interpretation provided intraoperatively.   Report Name: Ovary or Fallopian Tube   Status: Submitted   Part(s) Involved:   A: Ovary and fallopian tube, left   Synoptic Report:   CLINICAL   Clinical History:   - Pelvic mass   SPECIMEN   Procedure:   - Left salpingo-oophorectomy   - Hysterectomy   - Omentectomy   - Peritoneal  biopsies   Lymph Node Sampling:   - Performed   Location:   - Pelvic lymph nodes   - Para-aortic lymph nodes   Specimen Integrity:   - Left ovary   Specimen Integrity of Left Ovary:   - Capsule intact   TUMOR   Primary Tumor Site(s):   - Left ovary   Left Ovary   Tumor Size of Left Ovary: 19 cm   Histologic Type:   - Clear cell carcinoma   Tumor Extent   Ovarian Surface Involvement:   - Absent   Specimen(s)   Extent of Left Ovary:   - Involved   Extent of Left Fallopian Tube:   - Not involved   Extent of Omentum:   - Not involved   Extent of Uterus:   - Not involved   Extent of Peritoneum:   - Not involved   Peritoneal Ascitic Fluid:   - Not performed / unknown   Pleural Fluid:   - Not performed / unknown   LYMPH NODES     Number of Pelvic Lymph Nodes Examined: 20     Number of Pelvic Lymph Nodes Involved: None identified     Number of Para-aortic Lymph Nodes Examined: 7     Number of Para-Aortic Lymph Nodes Involved: None identified   STAGE (PTNM, AJCC 7TH ED.)   Primary Tumor (pT):   - Ovary    Ovarian Primary Tumor (pT):   - pT1a: Tumor limited to 1 ovary; capsule intact, no tumor on ovarian surface. No malignant cells in ascites or peritoneal washings#   Regional Lymph Nodes (pN):   - pN0: No regional lymph node metastasis       07/31/17: Dr Shaffer follow up: Discussed with the patient that given the high risk histology, as well as ruptured mass before surgery, she would be qualified at higher risk of recurrence despite early stage ovarian cancer.  Recommended adjuvant chemotherapy. Plan for carboplatin of AUC of 6 and paclitaxel of 175, m2 for 3 cycles. Referral for genetic counselor and will see her back after those 3 cycles  08/03/17: Call from patient stating she is going for a second opinion and would like chemotherapy rescheduled to week of 8/14/17.      08/16/17: Cycle #1 Carbo/Taxol.  73. Significant paclitaxel reaction.  08/30/17: C1 carboplatin/inpatient paclitaxel desensitization.   09/25/17: C2 carboplatin/paclitaxel- inpatient paclitaxel desensitization.  15.  10/16/17: C3 carboplatin/paclitaxel- switched to docetaxel given her neuropathy.  10.  11/14/17:  8. CT CAP:  IMPRESSION:   1. Post surgical changes of hysterectomy and bilateral  salpingo-oophorectomy. Nodular soft tissue density in the posterior  cul-de-sac along with ill-defined nodular thickening in the left  pelvis, suspicious for residual disease or metastatic deposits.   2. There is a 3 cm mass in the superior pole of the right kidney,  possibly extending into the renal hilum. Represents RCC until proven  otherwise. Consider renal mass protocol CT to assess renal vein  involvement.  3. Stable sub-4 mm pulmonary nodules, continued attention on  follow-up.     11/16/17: CT renal mass protocol  IMPRESSION:  1. Arterially enhancing mass measuring 3.6 x 2.1 x 2.2 cm mass arising  from superior posterior of the right kidney, this is renal cell  carcinoma until proven otherwise.  2. Stable fat-containing  umbilical hernia.     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 Margins negative. Three month surveillance plan with [facility].     2/26/18:  14     5/22/18:  11      8/23/18:  11     12/12/18:  10     3/8/19:  12     6/19/19:  13     9/23/19:  12    11/8/19: CT Chest:   IMPRESSION:   1. Stable, sub-4 mm pulmonary nodules as above.  2. Stable hypodense, subcentimeter nodules in the thyroid.     3/27/20:  14    10/1/20:  15    3/29/21:  10    7/18/21 CT A/P for abdominal pain in ER: IMPRESSION:   1.  No acute process in the abdomen or pelvis.  2.  Colonic diverticulosis without evidence for diverticulitis.  3.  Post right nephrectomy, hysterectomy, and oophorectomy    10/5/2021:  13  4/1/22:  13        Today Hafsa comes to the clinic without any gynecological concerns. She has met with urology for incontinence and completed pelvic floor PT. She might be interested in an operation in the future as she has not found PT to be overall helpful. The urinary incontinence is manageable with pads right now. She is sexually active with her boyfriend without pain or bleeding. She is on Xarelto daily for hx of DVT. She denies any vaginal bleeding, no changes in her bowel or bladder habits, no nausea/emesis, and no difficulties eating. She denies any abdominal discomfort/bloating, no fevers or chills, and no chest pain or shortness of breath. She up to date on breast screening (high risk screening through [facility]).  She is up to date on her colonoscopy (1/6/20) and mammogram. Will be due for DEXA scan. She had her portacath removed.            ROS: 10 point ROS neg other than the symptoms noted above in the HPI.      Past Medical History:    Past Medical History:   Diagnosis Date     Anxiety state, unspecified     [id number]     Arthritis 2014    vague, gradual, not treated really, knees feel it     At high risk for  breast cancer 09/27/2019    30.3% lifetime risk by SHERON model     Attention deficit disorder without mention of hyperactivity      Cancer (H) 7/2017 and 1/2018    ovarian and kidney cancers, both treated well     Chronic rhinitis      Depressive disorder lifetime    plus Anxiety plus ADD. Escitalipram, therapy, ADD meds maybe     Heart disease 1999    tachycardia and high cholesterol, managed     History of blood transfusion 07/2017    while in hospital for ovarian cancer     Hypertension 1999    tho BP was too LOW last week. past 12 years Generally OK     Panic disorder without agoraphobia      Pure hypercholesterolemia     Lipitor     Tachycardia, unspecified     9/1999-2/2004     Type II or unspecified type diabetes mellitus without mention of complication, not stated as uncontrolled     diagnosed 2004         Past Surgical History:    Past Surgical History:   Procedure Laterality Date     ABSCESS DRAINAGE Left     left leg      AS REMV KIDNEY,RADICAL Right      BIOPSY  7/2017 and 2/2018    ovarian and kidney cancer biopsies. also 1987 breast benign     BREAST CYST EXCISION  1985     BREAST SURGERY  1987    date unsure. benign breast cyst removed     CATARACT IOL, RT/LT Left 05/18/2018     CATARACT IOL, RT/LT Right 07/1318     COLONOSCOPY  2008 and 2013    polyps removed. Next due fall 2018     ENDOMETRIAL ABLATION  2008     EXCISE MASS BACK N/A 12/6/2021    Procedure: EXCISION, MASS, BACK;  Surgeon: Norris Vela MD;  Location: [facility] OR     HYSTERECTOMY TOTAL ABDOMINAL, BILATERAL SALPINGO-OOPHORECTOMY, NODE DISSECTION, COMBINED Left 7/7/2017    Procedure: COMBINED HYSTERECTOMY TOTAL ABDOMINAL, SALPINGO-OOPHORECTOMY, NODE DISSECTION;  Exploratory Laparotomy, Left Salpingo-Oophorectomy, Cancer Staging, Total Hysterectomy, Omentectomy, Evacuation of abdominal fluid, Lymph Node Dissection  Anesthesia Block ;  Surgeon: Serena Cole MD;  Location: [facility]     HYSTERECTOMY, PAP NO LONGER INDICATED   07/07/2017    Laparotomy; for ovarian cancer staging     HYSTERECTOMY, PAP NO LONGER INDICATED       INSERT PORT VASCULAR ACCESS Right 8/21/2017    Procedure: INSERT PORT VASCULAR ACCESS;  Single Lumen Chest Power Port;  Surgeon: Stephen Mike PA-C;  [facility]     IR PORT REMOVAL RIGHT  11/12/2021     LYMPHADENECTOMY RETROPERITONEAL Bilateral 07/07/2017    Laparotomy; pelvics & para-aortics; for ovarian cancer staging     OMENTECTOMY  07/07/2017    Laparotomy; for ovarian cancer staging     ORTHOPEDIC SURGERY  1/2002    broken left jaw due to fall, 4 fractures, titanium plates     OTHER SURGICAL HISTORY  01/2002    OTHER SURGICAL HISTORYfacial surgery due to fall      PHACOEMULSIFICATION CLEAR CORNEA WITH STANDARD INTRAOCULAR LENS IMPLANT Right 7/13/2018    Procedure: PHACOEMULSIFICATION CLEAR CORNEA WITH STANDARD INTRAOCULAR LENS IMPLANT;  RIght Eye Phacoemulsification Clear Cornea with Standard Intraocular Lens Placement ;  Surgeon: Mary Jo Massey MD;  [facility]     PHACOEMULSIFICATION CLEAR CORNEA WITH TORIC INTRAOCULAR LENS IMPLANT Left 5/18/2018    Procedure: PHACOEMULSIFICATION CLEAR CORNEA WITH TORIC INTRAOCULAR LENS IMPLANT;  Left Eye Phacoemulsification with Toric Lens;  Surgeon: Mary Jo Massey MD;  [facility]     REMOVE PORT VASCULAR ACCESS Right 11/12/2021    Procedure: REMOVAL, VASCULAR ACCESS PORT right;  Surgeon: Afsaneh Das PA-C;  Location: [facility] OR     SALPINGO OOPHORECTOMY,R/L/KAYY Right 2008    Salpingo Oophorectomy, RT     SALPINGO OOPHORECTOMY,R/L/KAYY Left 07/07/2017    Laparotomy; for ovarian cancer staging     SALPINGOOPHORECTOMY  2008     SURGICAL HISTORY OF -       facial surgery d/t fall in 1/2002     SURGICAL HISTORY OF -       1985 removal of breast cyst     SURGICAL HISTORY OF -   2008    endometrial ablation     YAG CAPSULOTOMY OD (RIGHT EYE)  10/22/2018         Health Maintenance Due   Topic Date Due     ZOSTER IMMUNIZATION (1 of 2) Never done     EYE  EXAM  11/28/2019     DIABETIC FOOT EXAM  10/01/2020     MEDICARE ANNUAL WELLNESS VISIT  12/01/2021     LIPID  12/01/2021     MICROALBUMIN  12/01/2021     FALL RISK ASSESSMENT  12/01/2021     PHQ-9  12/25/2021     A1C  02/04/2022       Current Medications:     Current Outpatient Medications   Medication Sig Dispense Refill     Acetaminophen (TYLENOL PO) Take 500 mg by mouth 2 tabs prn pain (monthly)       atorvastatin (LIPITOR) 80 MG tablet TAKE 1 TABLET(80 MG) BY MOUTH DAILY 90 tablet 0     B Complex Vitamins (VITAMIN B-COMPLEX PO) Take 50 mg by mouth        CALCIUM-MAGNESIUM-VITAMIN D PO Take 2 tablets by mouth daily       Cholecalciferol (VITAMIN D) 1000 UNIT capsule Take 2,000 Units by mouth daily        DiphenhydrAMINE HCl (BENADRYL PO) Take 50 mg by mouth daily as needed (monthly)       EPINEPHrine (EPIPEN/ADRENACLICK/OR ANY BX GENERIC EQUIV) 0.3 MG/0.3ML injection 2-pack Inject 0.3 mLs (0.3 mg) into the muscle once as needed for anaphylaxis 0.3 mL PRN     escitalopram (LEXAPRO) 20 MG tablet Take 1 tablet (20 mg) by mouth daily 90 tablet 1     fluticasone (FLONASE) 50 MCG/ACT nasal spray SHAKE LIQUID AND USE 2 SPRAYS IN EACH NOSTRIL DAILY 48 g 3     Magnesium Hydroxide (MAGNESIA PO) Take 500 mg by mouth       metFORMIN (GLUCOPHAGE-XR) 500 MG 24 hr tablet TAKE 2 TABLETS BY MOUTH EVERY DAY WITH THE EVENING MEAL 60 tablet 0     Multiple Vitamins-Minerals (MULTIVITAMIN ADULT PO) Take 1 tablet by mouth daily       nitroFURantoin macrocrystal-monohydrate (MACROBID) 100 MG capsule TAKE 1 CAPSULE BY MOUTH AFTER INTERCOURSE AS NEEDED 30 capsule PRN     nystatin (MYCOSTATIN) 326212 UNIT/GM external powder Apply topically 2 times daily as needed Apply to folds under breast and abdomen. 60 g 1     Probiotic Product (PRO-BIOTIC BLEND PO) Take 1 capsule by mouth daily Enzymatic Therapy-probiotic pearls       rivaroxaban ANTICOAGULANT (XARELTO) 20 MG TABS tablet Take 1 tablet (20 mg) by mouth daily (with dinner) 30 tablet 3      tamoxifen (NOLVADEX) 10 MG tablet Cut each pill in 1/2 with pill cutter to ensure 5 mg daily dose. 45 tablet 3     traZODone (DESYREL) 50 MG tablet TAKE 1 TABLET  BY MOUTH EVERY NIGHT AS NEEDED FOR SLEEP 180 tablet PRN     fluconazole (DIFLUCAN) 150 MG tablet Take 1 tablet (150 mg) by mouth daily Take additional tablet 72 hours after first if symptoms persist. (Patient not taking: Reported on 2022) 2 tablet 1     LORazepam (ATIVAN) 1 MG tablet Take 1 tablet (1 mg) by mouth every 6 hours as needed (nausea/vomiting, anxiety or sleep) (Patient not taking: Reported on 2022) 30 tablet 1         Allergies:        Allergies   Allergen Reactions     Paclitaxel Anaphylaxis and Difficulty breathing     Wasps [Hornets] Anaphylaxis     Yellow Hornet Venom [Hornet Venom] Anaphylaxis and Swelling     Yellow Jacket Venom [Venomil Honey Bee] Anaphylaxis and Swelling     Sulfa Drugs Hives and Rash        Social History:     Social History     Tobacco Use     Smoking status: Never Smoker     Smokeless tobacco: Never Used   Substance Use Topics     Alcohol use: Yes     Comment: Very infrequent, a bit of wine or beer 2x per month maybe       History   Drug Use     Types: Marijuana     Comment: infrequent, for celebrations only, maybe 8x per year         Family History:     The patient's family history is notable for:     Family History   Problem Relation Age of Onset     C.A.D. Mother      Hypertension Mother      Breast Cancer Mother      Psychotic Disorder Mother      Colon Cancer Mother      Cancer Mother         Bone cancer     Coronary Artery Disease Mother      Hyperlipidemia Mother         treated w. statins     Other Cancer Mother         bone/marrow, ,  of this     Depression Mother      Anxiety Disorder Mother      Mental Illness Mother         various undiagnosed types, but THERE     Obesity Mother      Bone Cancer Mother      Other - See Comments Mother         psychotic disease      C.A.D. Father       Diabetes Father      Hypertension Father      Cerebrovascular Disease Father         1984 or      Psychotic Disorder Father      Pancreatic Cancer Father      Coronary Artery Disease Father      Hyperlipidemia Father         treated w statins     Other Cancer Father         pancreatic, ,  of this     Depression Father      Mental Illness Father         likely PTSD and depression     Obesity Father      Other - See Comments Father         cerebrovascular disease, psychotic disease      Psychotic Disorder Sister         x2     Neurologic Disorder Sister      Hypertension Sister      Hyperlipidemia Sister         treated w statins     Depression Sister      Anxiety Disorder Sister      Obesity Sister      Rashes/Skin Problems Sister         precancerous lesion on leg     Psychotic Disorder Sister      Other - See Comments Sister         small kidney stone      Hypertension Sister      Hyperlipidemia Sister         treated w statins     Depression Sister      Anxiety Disorder Sister      Other - See Comments Sister         psychotic disease      Prostate Problems Brother         cleared      Hypertension Brother      Hyperlipidemia Brother         unsure if treated w statins     Depression Brother      Anxiety Disorder Brother      Mental Illness Brother         undiagnosed but THERE     Obesity Brother      Psychotic Disorder Brother         x2     Depression Brother         major depressive disorder and OCD     Anxiety Disorder Brother      Mental Illness Brother         not sure of diagnoses, treated     Hypertension Brother      Hyperlipidemia Brother      Prostate Cancer Brother      Depression Brother      Anxiety Disorder Brother      Other - See Comments Brother         psychotic disease      Psychotic Disorder Maternal Grandmother         ?     Coronary Artery Disease Maternal Grandmother          of heart attack age 84?     Depression Maternal Grandmother      Psychotic Disorder Maternal  Grandfather         ?     Psychotic Disorder Paternal Grandmother         Schizophernia     Coronary Artery Disease Paternal Grandmother          of heart attack, age 85?     Depression Paternal Grandmother         severe, but recovered     Mental Illness Paternal Grandmother         possible bipolar or other     Schizophrenia Paternal Grandmother      Psychotic Disorder Paternal Grandfather         ?     Respiratory Paternal Grandfather          of emphysema and smoking     Emphysema Paternal Grandfather      Cancer - colorectal No family hx of      Glaucoma No family hx of      Macular Degeneration No family hx of          Physical Exam:     /73   Pulse 91   Temp 98.3  F (36.8  C) (Oral)   Resp 18   Wt 98.7 kg (217 lb 11.2 oz)   LMP 2009   SpO2 98%   BMI 38.56 kg/m    Body mass index is 38.56 kg/m .    General Appearance: healthy and alert, no distress     HEENT: no thyromegaly, no palpable nodules or masses       Cardiovascular: regular rate and rhythm, no gallops, rubs or murmurs     Respiratory: lungs clear, no rales, rhonchi or wheezes    Musculoskeletal: extremities non tender without edema    Skin:  no lesions or rashes; lower left back with dressing applied     Neurological: normal gait, no gross defects     Psychiatric: appropriate mood and affect                               Hematological: normal cervical, supraclavicular and inguinal lymph nodes     Gastrointestinal:       abdomen soft, non-tender, non-distended, no organomegaly or masses    Genitourinary: External genitalia and urethral meatus appears normal. Vagina is smooth without nodularity or masses. Cervix surgically absent.  Bimanual exam reveal no masses, nodularity or fullness. Recto-vaginal exam confirms these findings.       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 She completed treatment with surgery and three cycles of chemotherapy on  10/16/17. She is here today for follow up and surveillance.    30 minutes spent on the date of the encounter doing chart review, history and exam, documentation, and further activities as noted above.          Plan:     1.)         ASHISH on exam. Continue surveillance every six months x3 years (through 10/2022) then annually thereafter with  and pelvic/rectal exam. Message sent to ALLYSON Mcadams to discuss with Dr. Shaffer the patient's wishes for every 1-2 year imaging and continued every 6 month exams. Reviewed signs and symptoms for when she should contact the clinic or seek additional care. Patient to contact the clinic with any questions or concerns in the interim.     2.) Genetic testing: Hafsa is negative for mutations in the AIP, ALK, APC, COSTA, BAP1, BARD1, BLM, BRCA1, BRCA2, BRIP1, BMPR1A, CDH1, CDK4, CDKN1B, CDKN2A, CHEK2, DICER1, EPCAM, FANCC, FH, FLCN, GALNT12, GREM1, HOXB13, MAX, MEN1, MET, MITF, MLH1, MRE11A, MSH2, MSH6, MUTYH, NBN, NF1, NF2, PALB2, PHOX2B, PMS2, POLD1, POLE, POT1, VSVJV4B, PTCH1, PTEN, RAD50, RAD51C, RAD51D, RB1, RET, SDHA, SDHAF2, SDHB, SDHC, SDHD, SMAD4, SMARCA4, SMARCB1, SMARCE1, STK11, SUFU, QQSP812, TP53, TSC1, TSC2, VHL, and XRCC2 genes. No mutations were found in any of the 67 genes analyzed. High risk breast screening. Sees [facility]. On low dose tamoxifen since 10/10/2019 for prevention of breast cancer.     3.) Labs and/or tests ordered include:   (reviewed)     4.) Health maintenance issues addressed today include annual health maintenance and non-gynecologic issues with PCP. Encouraged DEXA scan.     5.)        Lung nodules: stable for two years on CT considered statistically benign and no further imaging needed unless new symptoms.     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 Mercado with Urology. Urinary incontinence: stress and urge. Considering surgery.     MARLON Bradshaw, NP-BC  Women's Health Nurse  Practitioner  Division of Gynecologic Oncology  [facility]    CC  Patient Care Team:  Morelia Ayon, NP as PCP - General  Nabeel Dick MD as MD (Urology)  Javier Gregorio MD as MD (Urology)  Lola Vasquez, PhD LP as Psychologist (Psychology)  Leland Mercado MD as MD (Urology)  Dayanna Clayton, RN as Registered Nurse (Oncology)  Norris Vela MD as MD (Critical Care)  Shawanda Connelly, RN as Registered Nurse

## 2022-04-07 NOTE — NURSING NOTE
"Oncology Rooming Note    April 7, 2022 10:24 AM   Hafsa Corona is a 67 year old female who presents for:    Chief Complaint   Patient presents with     Oncology Clinic Visit     Ovarian cancer      Initial Vitals: /73   Pulse 91   Temp 98.3  F (36.8  C) (Oral)   Resp 18   Wt 98.7 kg (217 lb 11.2 oz)   LMP 01/01/2009   SpO2 98%   BMI 38.56 kg/m   Estimated body mass index is 38.56 kg/m  as calculated from the following:    Height as of 12/13/21: 1.6 m (5' 3\").    Weight as of this encounter: 98.7 kg (217 lb 11.2 oz). Body surface area is 2.09 meters squared.  No Pain (0) Comment: Data Unavailable   Patient's last menstrual period was 01/01/2009.  Allergies reviewed: Yes  Medications reviewed: Yes    Medications: Medication refills not needed today.  Pharmacy name entered into [facility]:    [id number] - [place], [place] - [address] AT [facility] [address]  [id number] - [place], [place] - [facility] AT [facility] [address]  [facility] [place], [place] - [phone]    Clinical concerns: Would like to schedule a CT scan       Dayna Harmon LPN            "

## 2022-06-02 ENCOUNTER — LAB (OUTPATIENT)
Dept: FAMILY MEDICINE | Facility: CLINIC | Age: 67
End: 2022-06-02
Payer: COMMERCIAL

## 2022-06-02 DIAGNOSIS — Z20.822 CLOSE EXPOSURE TO 2019 NOVEL CORONAVIRUS: ICD-10-CM

## 2022-06-02 LAB — SARS-COV-2 RNA RESP QL NAA+PROBE: POSITIVE

## 2022-06-02 PROCEDURE — U0005 INFEC AGEN DETEC AMPLI PROBE: HCPCS

## 2022-06-02 PROCEDURE — U0003 INFECTIOUS AGENT DETECTION BY NUCLEIC ACID (DNA OR RNA); SEVERE ACUTE RESPIRATORY SYNDROME CORONAVIRUS 2 (SARS-COV-2) (CORONAVIRUS DISEASE [COVID-19]), AMPLIFIED PROBE TECHNIQUE, MAKING USE OF HIGH THROUGHPUT TECHNOLOGIES AS DESCRIBED BY CMS-2020-01-R: HCPCS

## 2022-06-03 ENCOUNTER — VIRTUAL VISIT (OUTPATIENT)
Dept: FAMILY MEDICINE | Facility: CLINIC | Age: 67
End: 2022-06-03
Payer: COMMERCIAL

## 2022-06-03 ENCOUNTER — TELEPHONE (OUTPATIENT)
Dept: NURSING | Facility: CLINIC | Age: 67
End: 2022-06-03
Payer: COMMERCIAL

## 2022-06-03 DIAGNOSIS — U07.1 INFECTION DUE TO 2019 NOVEL CORONAVIRUS: Primary | ICD-10-CM

## 2022-06-03 PROCEDURE — 99214 OFFICE O/P EST MOD 30 MIN: CPT | Mod: 95 | Performed by: FAMILY MEDICINE

## 2022-06-03 RX ORDER — ONDANSETRON 4 MG/1
4-8 TABLET, FILM COATED ORAL EVERY 8 HOURS PRN
Qty: 12 TABLET | Refills: 0 | Status: SHIPPED | OUTPATIENT
Start: 2022-06-03 | End: 2022-07-06

## 2022-06-03 ASSESSMENT — PATIENT HEALTH QUESTIONNAIRE - PHQ9
SUM OF ALL RESPONSES TO PHQ QUESTIONS 1-9: 0
SUM OF ALL RESPONSES TO PHQ QUESTIONS 1-9: 0
10. IF YOU CHECKED OFF ANY PROBLEMS, HOW DIFFICULT HAVE THESE PROBLEMS MADE IT FOR YOU TO DO YOUR WORK, TAKE CARE OF THINGS AT HOME, OR GET ALONG WITH OTHER PEOPLE: NOT DIFFICULT AT ALL

## 2022-06-03 NOTE — TELEPHONE ENCOUNTER
Patient classified as COVID treatment eligible by Epic high risk algorithm:  Yes    Coronavirus (COVID-19) Notification    Reason for call  Notify of POSITIVE COVID-19 lab result, assess symptoms,  review [facility] recommendations    Lab Result   Lab test for 2019-nCoV rRt-PCR or SARS-COV-2 PCR  Oropharyngeal AND/OR nasopharyngeal swabs were POSITIVE for 2019-nCoV RNA [OR] SARS-COV-2 RNA (COVID-19) RNA     We have been unable to reach patient by phone at this time to notify of their Positive COVID-19 result.    Left voicemail message requesting a call back to [phone] [facility] for results.        A Positive COVID-19 letter will be sent via [employer] or the mail. (Exception, no letters sent to Presurgerical/Preprocedure Patients)    Mickie Batres

## 2022-06-03 NOTE — PATIENT INSTRUCTIONS
"  Instructions for Patients  Your COVID-19 test was positive. This means you have the virus. Please follow the \"How can I take care of myself\" and \"How do I self-isolate?\" guidelines in these instructions.    What treatments are available?  Over-the-counter medicines may help with your symptoms such as runny or stuffy nose, cough, chills, and fever. Talk to your care team about your options.     Some people are at high risk for severe illness (for example if you have a weak immune system, you're 65 or older, or you have certain medical problems). If your risk it high and your symptoms started in the last 5 to 7 days, we strongly recommend for you to get COVID treatment as soon as possible before you get really sick. Paxlovid, Molnupiravir and the monoclonal antibody treatments are proven safe and effective, make you feel better faster, and prevent hospitalization and death.       You can schedule an appointment to discuss COVID treatment by requesting an appointment on [facility] by selecting \"schedule COVID-19 Treatment\" or by calling [employer] ([phone]) and pressing 7.    What are the symptoms of COVID-19?  Symptoms can include fever, cough, shortness of breath, chills, headache, muscle pain sore throat, fatigue, runny or stuffy nose, and loss of taste and smell. Other less common symptoms include nausea, vomiting, or diarrhea (watery stools).    Know when to call 911. Emergency warning signs include:    Trouble breathing or shortness of breath    Pain or pressure in the chest that doesn't go away    Feeling confused like you haven't felt before, or not being able to wake up    Bluish-colored lips or face    How can I take care of myself?  1. Get lots of rest. Drink extra fluids (unless a doctor has told you not to).  2. Take Tylenol (acetaminophen) for fever or pain. If you have liver or kidney problems, ask your family doctor if it's okay to take Tylenol   Adults:   650 mg (two 325 mg pills or tablets) " every 4 to 6 hours, or...   1,000 mg (two 500 mg pills or tablets) every 8 hours as needed.  Note: Don't take more than 3,000 mg in one day. Acetaminophen is found in many medicines (both prescribed and over the counter). Read all labels to be sure you don't take too much.  For children, check the Tylenol bottle for the right dose. The dose is based on the child's age or weight.  3. Take over the counter medicines for your symptoms as needed. Talk to your pharmacist.  4. If you have other health problems (like cancer, heart failure, an organ transplant, or severe kidney disease): Call your specialty clinic if you don't feel better in the next 2 days.    These guidelines are for isolating and quarantining before returning to work, school or .     For employers, schools and day cares: This is an official notice for this person s medical guidelines for returning in-person.     For health care sites: The CDC gives different isolation and quarantine guidelines for healthcare sites, please check with these sites before arriving.     How do I self-isolate?  You isolate when you have symptoms of COVID or a test shows you have COVID, even if you don t have symptoms.     If you DO have symptoms:  o Stay home and away from others  - For at least 5 days after your symptoms started, AND   - You are fever free for 24 hours (with no medicine that reduces fever), AND  - Your other symptoms are better.  o Wear a mask for 10 full days any time you are around others.    If you DON T have symptoms:  o Stay at home and away from others for at least 5 days after your positive test.  o Wear a mask for 10 full days any time you are around others.    How and when do I quarantine?  You quarantine when you may have been exposed to the virus and DON T have symptoms.     Stay home and away from others.     You must quarantine for 5 days after your last contact with a person who has COVID.  o Note: If you are fully vaccinated, you don t  need to quarantine. You should still follow the steps below.     Wear a mask for 10 full days any time you re around others.    Get tested at least 5 days after you were exposed, even if you don t have symptoms.     If you start to have symptoms, isolate right away and get tested.    Where can I get more information?    [facility] COVID-19 Resource Hub: [email or web address]     CDC Quarantine & Isolation: https://www.cdc.gov/coronavirus/2019-ncov/your-health/quarantine-isolation.html     CDC - What to Do If You're Sick: https://www.cdc.gov/coronavirus/2019-ncov/if-you-are-sick/index.html    [facility] clinical trials (COVID-19 research studies): [email or web address]    [place] Department of Health COVID-19 Public Hotline: 1-592.677.4620

## 2022-06-03 NOTE — PROGRESS NOTES
"Hafsa is a 67 year old who is being evaluated via a billable video visit.      How would you like to obtain your AVS? MyChart  If the video visit is dropped, the invitation should be resent by: Text to cell phone: [phone]  Will anyone else be joining your video visit? No    Video Start Time: 2:30pm    Assessment & Plan       Infection due to 2019 novel coronavirus  Risk factors including obesity, age greater than 65, diabetes, hypertension, Coronary atherosclerosis, chronic kidney disease given medications including Xarelto and trazodone she would be relatively contraindicated to using pack Slo-Bid.  We discussed Mult-Ferr and Iver as a viable alternative.  She meets criteria for this.  Discussed emergency use authorization.  Discussed risk reduction, side effects.  Breast-feeding contraindication does not apply to her.  She would like this faxed to the [facility] pharmacy.  Instructed that she call ahead prior to going to pick it up.  - molnupiravir (LAGEVRIO) 200 MG capsule; Take 4 capsules (800 mg) by mouth every 12 hours for 5 days  - ondansetron (ZOFRAN) 4 MG tablet; Take 1-2 tablets (4-8 mg) by mouth every 8 hours as needed for nausea             BMI:   Estimated body mass index is 38.56 kg/m  as calculated from the following:    Height as of 12/13/21: 1.6 m (5' 3\").    Weight as of 4/7/22: 98.7 kg (217 lb 11.2 oz).           No follow-ups on file.    Laura Orozco MD  [facility]    Subjective   Hafsa is a 67 year old who presents for the following health issues     HPI     Answers for HPI/ROS submitted by the patient on 6/3/2022  If you checked off any problems, how difficult have these problems made it for you to do your work, take care of things at home, or get along with other people?: Not difficult at all  PHQ9 TOTAL SCORE: 0              COVID-19 Symptom Review  How many days ago did these symptoms start? 3 days    Are any of the following symptoms " significant for you?    New or worsening difficulty breathing? No    Worsening cough? Yes, it's a dry cough.     Fever or chills? Yes, the highest temperature was 101.8F  oral    Headache: YES    Sore throat: YES    Chest pain: no    Diarrhea: no    Body aches? YES    What treatments has patient tried? Decongestant - nasal   Does patient live in a nursing home, group home, or shelter? no  Does patient have a way to get food/medications during quarantined? Yes, I have a friend or family member who can help me.            Review of Systems   Constitutional, neuro, ENT, endocrine, pulmonary, cardiac, gastrointestinal, genitourinary, musculoskeletal, integument and psychiatric systems are negative, except as otherwise noted.       Objective           Vitals:  No vitals were obtained today due to virtual visit.    Physical Exam   GENERAL: Healthy, alert and no distress  EYES: Eyes grossly normal to inspection.  No discharge or erythema, or obvious scleral/conjunctival abnormalities.  RESP: No audible wheeze, cough, or visible cyanosis.  No visible retractions or increased work of breathing.    SKIN: Visible skin clear. No significant rash, abnormal pigmentation or lesions.  NEURO: Cranial nerves grossly intact.  Mentation and speech appropriate for age.  PSYCH: Mentation appears normal, affect normal/bright, judgement and insight intact, normal speech and appearance well-groomed.                Video-Visit Details    Type of service:  Video Visit    Video End Time:3:03 PM    Originating Location (pt. Location): Home    Distant Location (provider location):  [facility]     Platform used for Video Visit: [employer]

## 2022-06-08 ENCOUNTER — VIRTUAL VISIT (OUTPATIENT)
Dept: FAMILY MEDICINE | Facility: CLINIC | Age: 67
End: 2022-06-08
Payer: COMMERCIAL

## 2022-06-08 DIAGNOSIS — E11.9 TYPE 2 DIABETES MELLITUS WITHOUT COMPLICATION, WITHOUT LONG-TERM CURRENT USE OF INSULIN (H): ICD-10-CM

## 2022-06-08 DIAGNOSIS — U07.1 INFECTION DUE TO 2019 NOVEL CORONAVIRUS: Primary | ICD-10-CM

## 2022-06-08 DIAGNOSIS — R10.84 ABDOMINAL PAIN, GENERALIZED: ICD-10-CM

## 2022-06-08 DIAGNOSIS — Z91.030 BEE ALLERGY STATUS: ICD-10-CM

## 2022-06-08 PROCEDURE — 99215 OFFICE O/P EST HI 40 MIN: CPT | Mod: GT | Performed by: NURSE PRACTITIONER

## 2022-06-08 RX ORDER — EPINEPHRINE 0.3 MG/.3ML
0.3 INJECTION SUBCUTANEOUS
Qty: 0.3 ML | Status: SHIPPED | OUTPATIENT
Start: 2022-06-08

## 2022-06-08 RX ORDER — METFORMIN HCL 500 MG
TABLET, EXTENDED RELEASE 24 HR ORAL
Qty: 60 TABLET | Refills: 5 | Status: SHIPPED | OUTPATIENT
Start: 2022-06-08 | End: 2022-07-06

## 2022-06-08 NOTE — PROGRESS NOTES
"Hafsa is a 67 year old who is being evaluated via a billable video visit.      How would you like to obtain your AVS? MyChart  If the video visit is dropped, the invitation should be resent by: Send to e-mail at: [email or web address]  Will anyone else be joining your video visit? No    Video Start Time: 5:35 PM    Assessment & Plan     (U07.1) Infection due to 2019 novel coronavirus  (primary encounter diagnosis)  Comment: improving  Plan: Questions answered.     (E11.9) Type 2 diabetes mellitus without complication, without long-term current use of insulin (H)  Comment: at goal  Plan: Will follow up next month for labs.     (Z91.030) Bee allergy status  Comment:   Plan: Epi pen refilled.     (R10.84) Abdominal pain, generalized  Comment:   Plan: Discussed trial of a low-FODMAP.      45 minutes spent on the date of the encounter doing chart review, patient visit and documentation        BMI:   Estimated body mass index is 38.56 kg/m  as calculated from the following:    Height as of 12/13/21: 1.6 m (5' 3\").    Weight as of 4/7/22: 98.7 kg (217 lb 11.2 oz).           No follow-ups on file.    Morelia Ayon NP  [facility]    Aiyana Pereyra is a 67 year old who presents for the following health issues     HPI     She finished the antiviral today, did not have any side effects.  She still has a cough and a mild sore throat, fatigue - but energy is improved.  She still has nasal congestion, sneezing has stopped.  She no longer has fevers.      Episodic abdominal pain, has gone to the ED, CT scans negative.  Gas pills help.        Review of Systems         Objective           Vitals:  No vitals were obtained today due to virtual visit.    Physical Exam   GENERAL: Healthy, alert and no distress  EYES: Eyes grossly normal to inspection.  No discharge or erythema, or obvious scleral/conjunctival abnormalities.  RESP: No audible wheeze, cough, or visible cyanosis.  No visible " retractions or increased work of breathing.    SKIN: Visible skin clear. No significant rash, abnormal pigmentation or lesions.  NEURO: Cranial nerves grossly intact.  Mentation and speech appropriate for age.  PSYCH: Mentation appears normal, affect normal/bright, judgement and insight intact, normal speech and appearance well-groomed.                Video-Visit Details    Type of service:  Video Visit    Video End Time:6:17 PM    Originating Location (pt. Location): Home    Distant Location (provider location):  [facility]     Platform used for Video Visit: [employer]

## 2022-07-06 ENCOUNTER — OFFICE VISIT (OUTPATIENT)
Dept: FAMILY MEDICINE | Facility: CLINIC | Age: 67
End: 2022-07-06
Payer: COMMERCIAL

## 2022-07-06 VITALS
BODY MASS INDEX: 36.86 KG/M2 | DIASTOLIC BLOOD PRESSURE: 75 MMHG | HEART RATE: 88 BPM | WEIGHT: 208 LBS | RESPIRATION RATE: 15 BRPM | SYSTOLIC BLOOD PRESSURE: 108 MMHG | TEMPERATURE: 97.5 F | HEIGHT: 63 IN | OXYGEN SATURATION: 95 %

## 2022-07-06 DIAGNOSIS — G47.00 INSOMNIA, UNSPECIFIED TYPE: ICD-10-CM

## 2022-07-06 DIAGNOSIS — L72.9 SKIN CYST: ICD-10-CM

## 2022-07-06 DIAGNOSIS — G62.0 PERIPHERAL NEUROPATHY DUE TO CHEMOTHERAPY (H): ICD-10-CM

## 2022-07-06 DIAGNOSIS — E11.9 TYPE 2 DIABETES MELLITUS WITHOUT COMPLICATION, WITHOUT LONG-TERM CURRENT USE OF INSULIN (H): Primary | ICD-10-CM

## 2022-07-06 DIAGNOSIS — E78.5 HYPERLIPIDEMIA LDL GOAL <100: ICD-10-CM

## 2022-07-06 DIAGNOSIS — Z79.899 ENCOUNTER FOR LONG-TERM (CURRENT) USE OF MEDICATIONS: ICD-10-CM

## 2022-07-06 DIAGNOSIS — Z87.440 HISTORY OF RECURRENT URINARY TRACT INFECTION: ICD-10-CM

## 2022-07-06 DIAGNOSIS — I83.92 VARICOSE VEINS OF LEFT LOWER EXTREMITY, UNSPECIFIED WHETHER COMPLICATED: ICD-10-CM

## 2022-07-06 DIAGNOSIS — N18.30 STAGE 3 CHRONIC KIDNEY DISEASE, UNSPECIFIED WHETHER STAGE 3A OR 3B CKD (H): ICD-10-CM

## 2022-07-06 DIAGNOSIS — T45.1X5A PERIPHERAL NEUROPATHY DUE TO CHEMOTHERAPY (H): ICD-10-CM

## 2022-07-06 DIAGNOSIS — F33.0 MAJOR DEPRESSIVE DISORDER, RECURRENT EPISODE, MILD (H): ICD-10-CM

## 2022-07-06 DIAGNOSIS — C56.2 OVARIAN CANCER, LEFT (H): ICD-10-CM

## 2022-07-06 DIAGNOSIS — E66.01 MORBID OBESITY (H): ICD-10-CM

## 2022-07-06 DIAGNOSIS — J32.0 CHRONIC MAXILLARY SINUSITIS: ICD-10-CM

## 2022-07-06 LAB
ALBUMIN SERPL-MCNC: 3.5 G/DL (ref 3.4–5)
ALP SERPL-CCNC: 78 U/L (ref 40–150)
ALT SERPL W P-5'-P-CCNC: 17 U/L (ref 0–50)
ANION GAP SERPL CALCULATED.3IONS-SCNC: 9 MMOL/L (ref 3–14)
AST SERPL W P-5'-P-CCNC: 23 U/L (ref 0–45)
BILIRUB SERPL-MCNC: 0.4 MG/DL (ref 0.2–1.3)
BUN SERPL-MCNC: 28 MG/DL (ref 7–30)
CALCIUM SERPL-MCNC: 8.7 MG/DL (ref 8.5–10.1)
CHLORIDE BLD-SCNC: 107 MMOL/L (ref 94–109)
CHOLEST SERPL-MCNC: 154 MG/DL
CO2 SERPL-SCNC: 23 MMOL/L (ref 20–32)
CREAT SERPL-MCNC: 1.1 MG/DL (ref 0.52–1.04)
CREAT UR-MCNC: 151 MG/DL
FASTING STATUS PATIENT QL REPORTED: YES
GFR SERPL CREATININE-BSD FRML MDRD: 54 ML/MIN/1.73M2
GLUCOSE BLD-MCNC: 127 MG/DL (ref 70–99)
HBA1C MFR BLD: 6.6 % (ref 0–5.6)
HDLC SERPL-MCNC: 51 MG/DL
LDLC SERPL CALC-MCNC: 73 MG/DL
MICROALBUMIN UR-MCNC: <5 MG/L
MICROALBUMIN/CREAT UR: NORMAL MG/G{CREAT}
NONHDLC SERPL-MCNC: 103 MG/DL
POTASSIUM BLD-SCNC: 4.1 MMOL/L (ref 3.4–5.3)
PROT SERPL-MCNC: 7 G/DL (ref 6.8–8.8)
SODIUM SERPL-SCNC: 139 MMOL/L (ref 133–144)
TRIGL SERPL-MCNC: 151 MG/DL

## 2022-07-06 PROCEDURE — 82043 UR ALBUMIN QUANTITATIVE: CPT | Performed by: NURSE PRACTITIONER

## 2022-07-06 PROCEDURE — 36415 COLL VENOUS BLD VENIPUNCTURE: CPT | Performed by: NURSE PRACTITIONER

## 2022-07-06 PROCEDURE — 80053 COMPREHEN METABOLIC PANEL: CPT | Performed by: NURSE PRACTITIONER

## 2022-07-06 PROCEDURE — 99215 OFFICE O/P EST HI 40 MIN: CPT | Mod: 25 | Performed by: NURSE PRACTITIONER

## 2022-07-06 PROCEDURE — 83036 HEMOGLOBIN GLYCOSYLATED A1C: CPT | Performed by: NURSE PRACTITIONER

## 2022-07-06 PROCEDURE — 90471 IMMUNIZATION ADMIN: CPT | Performed by: NURSE PRACTITIONER

## 2022-07-06 PROCEDURE — 80061 LIPID PANEL: CPT | Performed by: NURSE PRACTITIONER

## 2022-07-06 PROCEDURE — 90677 PCV20 VACCINE IM: CPT | Performed by: NURSE PRACTITIONER

## 2022-07-06 RX ORDER — NITROFURANTOIN 25; 75 MG/1; MG/1
CAPSULE ORAL
Qty: 30 CAPSULE | Status: SHIPPED | OUTPATIENT
Start: 2022-07-06 | End: 2023-08-01

## 2022-07-06 RX ORDER — ATORVASTATIN CALCIUM 80 MG/1
TABLET, FILM COATED ORAL
Qty: 90 TABLET | Refills: 3 | Status: SHIPPED | OUTPATIENT
Start: 2022-07-06 | End: 2023-07-11

## 2022-07-06 RX ORDER — TRAZODONE HYDROCHLORIDE 50 MG/1
TABLET, FILM COATED ORAL
Qty: 180 TABLET | Status: SHIPPED | OUTPATIENT
Start: 2022-07-06 | End: 2023-10-24

## 2022-07-06 RX ORDER — FLUTICASONE PROPIONATE 50 MCG
SPRAY, SUSPENSION (ML) NASAL
Qty: 48 G | Refills: 3 | Status: SHIPPED | OUTPATIENT
Start: 2022-07-06 | End: 2023-09-18

## 2022-07-06 RX ORDER — LORAZEPAM 1 MG/1
1 TABLET ORAL EVERY 6 HOURS PRN
Qty: 30 TABLET | Refills: 0 | Status: SHIPPED | OUTPATIENT
Start: 2022-07-06 | End: 2023-10-24

## 2022-07-06 RX ORDER — METFORMIN HCL 500 MG
TABLET, EXTENDED RELEASE 24 HR ORAL
Qty: 180 TABLET | Refills: 3 | Status: SHIPPED | OUTPATIENT
Start: 2022-07-06 | End: 2023-07-10

## 2022-07-06 NOTE — PROGRESS NOTES
DME (Durable Medical Equipment) Orders and Documentation  Orders Placed This Encounter   Procedures     Compression Sleeve/Stocking Order      The patient was assessed and it was determined the patient is in need of the following listed DME Supplies/Equipment. Please complete supporting documentation below to demonstrate medical necessity.      Compression Sleeve/Stocking(s) Supplies Documentation  The patient needs compression stockings for peripheral edema

## 2022-07-06 NOTE — PROGRESS NOTES
Assessment & Plan     (E11.9) Type 2 diabetes mellitus without complication, without long-term current use of insulin (H)  (primary encounter diagnosis)  Comment: at goal  Plan: OPTOMETRY REFERRAL, Albumin Random Urine         Quantitative with Creat Ratio, Pneumococcal 20         Valent Conjugate (PCV20), HEMOGLOBIN A1C,         Comprehensive metabolic panel (BMP + Alb, Alk         Phos, ALT, AST, Total. Bili, TP), metFORMIN         (GLUCOPHAGE XR) 500 MG 24 hr tablet, ASPIRIN         NOT PRESCRIBED (INTENTIONAL)        The current medical regimen is effective;  continue present plan and medications.     (N18.30) Stage 3 chronic kidney disease, unspecified whether stage 3a or 3b CKD (H)  Comment: stable  Plan:    (C56.2) Ovarian cancer, left (H)  Comment: in remission  Plan: LORazepam (ATIVAN) 1 MG tablet        She will continue to follow up with oncology.     (Z79.899) Encounter for long-term (current) use of medications  Comment:   Plan: LORazepam (ATIVAN) 1 MG tablet        Takes rarely    (J32.0) Chronic maxillary sinusitis  Comment:   Plan: fluticasone (FLONASE) 50 MCG/ACT nasal spray        Refills given.     (E78.5) Hyperlipidemia LDL goal <100  Comment: stable  Plan: atorvastatin (LIPITOR) 80 MG tablet        The current medical regimen is effective;  continue present plan and medications.     (Z87.440) History of recurrent urinary tract infection  Comment:   Plan: nitroFURantoin macrocrystal-monohydrate         (MACROBID) 100 MG capsule        Refills given.     (G47.00) Insomnia, unspecified type  Comment: stable  Plan: traZODone (DESYREL) 50 MG tablet        The current medical regimen is effective;  continue present plan and medications.     (I83.92) Varicose veins of left lower extremity, unspecified whether complicated  Comment:   Plan: Compression Sleeve/Stocking Order            (L72.9) Skin cyst  Comment:   Plan: Adult Dermatology Referral        Referral to dermatology given.     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     (E66.01) Morbid obesity (H)  Comment:   Plan: Continue to work on diet and exercise.       45 minutes spent on the date of the encounter doing chart review, patient visit and documentation        BMI:   Estimated body mass index is 36.85 kg/m  as calculated from the following:    Height as of this encounter: 1.6 m (5' 3\").    Weight as of this encounter: 94.3 kg (208 lb).   Weight management plan: Discussed healthy diet and exercise guidelines        No follow-ups on file.    Morelia Ayon NP  [facility]    Aiyana Pereyra is a 67 year old, presenting for the following health issues:  Lipids, Diabetes, and Urinary Problem (General follow up )      HPI     Diabetes Follow-up      How often are you checking your blood sugar? Not at all    What concerns do you have today about your diabetes? None     Do you have any of these symptoms? (Select all that apply)  No numbness or tingling in feet.  No redness, sores or blisters on feet.  No complaints of excessive thirst.  No reports of blurry vision.  No significant changes to weight.- other that one incident about her toe.     Have you had a diabetic eye exam in the last 12 months? No        BP Readings from Last 2 Encounters:   07/06/22 108/75   04/07/22 108/73     Hemoglobin A1C POCT (%)   Date Value   12/01/2020 6.0 (H)   10/01/2019 6.0 (H)     Hemoglobin A1C (%)   Date Value   08/04/2021 6.5 (H)     LDL Cholesterol Calculated (mg/dL)   Date Value   12/01/2020 69   03/27/2020 69           How many servings of fruits and vegetables do you eat daily?  2-3    On average, how many sweetened beverages do you drink each day (Examples: soda, juice, sweet tea, etc.  Do NOT " "count diet or artificially sweetened beverages)?   0    How many days per week do you exercise enough to make your heart beat faster? 5    How many minutes a day do you exercise enough to make your heart beat faster? 10 - 19    How many days per week do you miss taking your medication? 0- sometimes not sure if she is taking atorvastatin (LIPITOR) 80 MG tablet at the end of day.       Hyperlipidemia Follow-Up      Are you regularly taking any medication or supplement to lower your cholesterol?   Yes- atorvastatin (LIPITOR) 80 MG tablet    Are you having muscle aches or other side effects that you think could be caused by your cholesterol lowering medication?  No       Urinary problem follow up     Left leg concens   Varicose veins and swelling in left leg.         Review of Systems         Objective    /75 (BP Location: Right arm, Patient Position: Chair, Cuff Size: Adult Large)   Pulse 88   Temp 97.5  F (36.4  C) (Temporal)   Resp 15   Ht 1.6 m (5' 3\")   Wt 94.3 kg (208 lb)   LMP 01/01/2009   SpO2 95%   BMI 36.85 kg/m    Body mass index is 36.85 kg/m .  Physical Exam   GENERAL: healthy, alert and no distress  NECK: no adenopathy, no asymmetry, masses, or scars and thyroid normal to palpation  RESP: lungs clear to auscultation - no rales, rhonchi or wheezes  CV: regular rate and rhythm, normal S1 S2, no S3 or S4, no murmur, click or rub, no peripheral edema and peripheral pulses strong  MS: left leg varicose veins  SKIN: no suspicious lesions or rashes  NEURO: Normal strength and tone, mentation intact and speech normal  Diabetic foot exam: normal DP and PT pulses, no trophic changes or ulcerative lesions, normal sensory exam and normal monofilament exam                    .  ..  "

## 2022-07-06 NOTE — NURSING NOTE
Prior to immunization administration, verified patients identity using patient s name and date of birth. Please see Immunization Activity for additional information.     Screening Questionnaire for Adult Immunization    Are you sick today?   No   Do you have allergies to medications, food, a vaccine component or latex?   Yes   Have you ever had a serious reaction after receiving a vaccination?   Yes   Do you have a long-term health problem with heart, lung, kidney, or metabolic disease (e.g., diabetes), asthma, a blood disorder, no spleen, complement component deficiency, a cochlear implant, or a spinal fluid leak?  Are you on long-term aspirin therapy?   Yes   Do you have cancer, leukemia, HIV/AIDS, or any other immune system problem?   No   Do you have a parent, brother, or sister with an immune system problem?   No   In the past 3 months, have you taken medications that affect  your immune system, such as prednisone, other steroids, or anticancer drugs; drugs for the treatment of rheumatoid arthritis, Crohn s disease, or psoriasis; or have you had radiation treatments?   No   Have you had a seizure, or a brain or other nervous system problem?   No   During the past year, have you received a transfusion of blood or blood    products, or been given immune (gamma) globulin or antiviral drug?   No   For women: Are you pregnant or is there a chance you could become       pregnant during the next month?   No   Have you received any vaccinations in the past 4 weeks?   No     Immunization questionnaire was positive for at least one answer.  Notified Morelia Ayon        Per orders of Morelia Garibay, injection of PCV20  given by Ricardo Simmons MA. Patient instructed to remain in clinic for 15 minutes afterwards, and to report any adverse reaction to me immediately.       Screening performed by Ricardo Simmons MA on 7/6/2022 at 11:08 AM.

## 2022-07-12 DIAGNOSIS — Z86.718 PERSONAL HISTORY OF DVT (DEEP VEIN THROMBOSIS): ICD-10-CM

## 2022-07-29 ENCOUNTER — OFFICE VISIT (OUTPATIENT)
Dept: URGENT CARE | Facility: URGENT CARE | Age: 67
End: 2022-07-29
Payer: COMMERCIAL

## 2022-07-29 VITALS
TEMPERATURE: 97.9 F | DIASTOLIC BLOOD PRESSURE: 79 MMHG | OXYGEN SATURATION: 96 % | SYSTOLIC BLOOD PRESSURE: 114 MMHG | HEART RATE: 94 BPM | WEIGHT: 208 LBS | HEIGHT: 63 IN | RESPIRATION RATE: 18 BRPM | BODY MASS INDEX: 36.86 KG/M2

## 2022-07-29 DIAGNOSIS — N39.0 URINARY TRACT INFECTION WITHOUT HEMATURIA, SITE UNSPECIFIED: Primary | ICD-10-CM

## 2022-07-29 LAB
ALBUMIN UR-MCNC: NEGATIVE MG/DL
APPEARANCE UR: CLEAR
BACTERIA #/AREA URNS HPF: ABNORMAL /HPF
BILIRUB UR QL STRIP: NEGATIVE
COLOR UR AUTO: YELLOW
GLUCOSE UR STRIP-MCNC: NEGATIVE MG/DL
HGB UR QL STRIP: ABNORMAL
KETONES UR STRIP-MCNC: NEGATIVE MG/DL
LEUKOCYTE ESTERASE UR QL STRIP: ABNORMAL
NITRATE UR QL: NEGATIVE
PH UR STRIP: 5 [PH] (ref 5–7)
RBC #/AREA URNS AUTO: ABNORMAL /HPF
SP GR UR STRIP: 1.02 (ref 1–1.03)
SQUAMOUS #/AREA URNS AUTO: ABNORMAL /LPF
UROBILINOGEN UR STRIP-ACNC: 0.2 E.U./DL
WBC #/AREA URNS AUTO: ABNORMAL /HPF

## 2022-07-29 PROCEDURE — 81001 URINALYSIS AUTO W/SCOPE: CPT | Performed by: FAMILY MEDICINE

## 2022-07-29 PROCEDURE — 99213 OFFICE O/P EST LOW 20 MIN: CPT | Performed by: FAMILY MEDICINE

## 2022-07-29 PROCEDURE — 87086 URINE CULTURE/COLONY COUNT: CPT | Performed by: FAMILY MEDICINE

## 2022-07-29 PROCEDURE — 87186 SC STD MICRODIL/AGAR DIL: CPT | Performed by: FAMILY MEDICINE

## 2022-07-29 RX ORDER — CEPHALEXIN 500 MG/1
500 CAPSULE ORAL 2 TIMES DAILY
Qty: 10 CAPSULE | Refills: 0 | Status: SHIPPED | OUTPATIENT
Start: 2022-07-29 | End: 2022-08-03

## 2022-07-30 ENCOUNTER — OFFICE VISIT (OUTPATIENT)
Dept: URGENT CARE | Facility: URGENT CARE | Age: 67
End: 2022-07-30
Payer: COMMERCIAL

## 2022-07-30 ENCOUNTER — ANCILLARY PROCEDURE (OUTPATIENT)
Dept: GENERAL RADIOLOGY | Facility: CLINIC | Age: 67
End: 2022-07-30
Attending: PHYSICIAN ASSISTANT
Payer: COMMERCIAL

## 2022-07-30 VITALS
WEIGHT: 208 LBS | TEMPERATURE: 98.4 F | SYSTOLIC BLOOD PRESSURE: 138 MMHG | BODY MASS INDEX: 36.85 KG/M2 | HEART RATE: 96 BPM | OXYGEN SATURATION: 95 % | DIASTOLIC BLOOD PRESSURE: 85 MMHG

## 2022-07-30 DIAGNOSIS — S89.92XA KNEE INJURY, LEFT, INITIAL ENCOUNTER: Primary | ICD-10-CM

## 2022-07-30 DIAGNOSIS — N30.00 ACUTE CYSTITIS WITHOUT HEMATURIA: ICD-10-CM

## 2022-07-30 PROCEDURE — 99214 OFFICE O/P EST MOD 30 MIN: CPT | Performed by: PHYSICIAN ASSISTANT

## 2022-07-30 PROCEDURE — 73562 X-RAY EXAM OF KNEE 3: CPT | Mod: TC | Performed by: RADIOLOGY

## 2022-07-30 RX ORDER — CEPHALEXIN 500 MG/1
500 CAPSULE ORAL 4 TIMES DAILY
Qty: 20 CAPSULE | Refills: 0 | Status: SHIPPED | OUTPATIENT
Start: 2022-07-30 | End: 2022-07-30

## 2022-07-30 RX ORDER — CEPHALEXIN 500 MG/1
500 CAPSULE ORAL 4 TIMES DAILY
Qty: 20 CAPSULE | Refills: 0 | Status: SHIPPED | OUTPATIENT
Start: 2022-07-30 | End: 2023-08-01

## 2022-07-30 NOTE — LETTER
July 30, 2022      To Whom It May Concern:      Hafsa Corona was seen in our urgent care today, 07/30/22.  I expect her condition to improve over the next 1-2 weeks.  I don't want her lifting or squatting for the next 1-2 weeks.     Sincerely,        MADDY SolomonC

## 2022-07-30 NOTE — PROGRESS NOTES
"  Assessment & Plan     1. Urinary tract infection without hematuria, site unspecified    - UA macro with reflex to Microscopic and Culture - Clinc Collect  - Urine Microscopic  - cephALEXin (KEFLEX) 500 MG capsule; Take 1 capsule (500 mg) by mouth 2 times daily for 5 days  Dispense: 10 capsule; Refill: 0    Will opt to treat awaiting culture for sensitivities.  Continue with increased fluids.  Close Follow-up if no change or new or worsening sx prn.    Diana Emmanuel MD  [facility] [place]    Aiyana Pereyra is a 67 year old, presenting for the following health issues:  Urgent Care and UTI (UTI symptoms x2 weeks, difficulty urinating.)      HPI     Dysuria x 2 weeks.  No fever or flank pain.      Review of Systems   Constitutional, HEENT, cardiovascular, pulmonary, GI, , musculoskeletal, neuro, skin, endocrine and psych systems are negative, except as otherwise noted.      Objective    /79   Pulse 94   Temp 97.9  F (36.6  C) (Temporal)   Resp 18   Ht 1.6 m (5' 3\")   Wt 94.3 kg (208 lb)   LMP 01/01/2009   SpO2 96%   BMI 36.85 kg/m    Body mass index is 36.85 kg/m .  Physical Exam   GENERAL: healthy, alert and no distress  EYES: Eyes grossly normal to inspection, PERRL and conjunctivae and sclerae normal  MS: no gross musculoskeletal defects noted, no edema  SKIN: no suspicious lesions or rashes  PSYCH: mentation appears normal, affect normal/bright    Results for orders placed or performed in visit on 07/29/22 (from the past 24 hour(s))   UA macro with reflex to Microscopic and Culture - Clinc Collect    Specimen: Urine, Midstream   Result Value Ref Range    Color Urine Yellow Colorless, Straw, Light Yellow, Yellow    Appearance Urine Clear Clear    Glucose Urine Negative Negative mg/dL    Bilirubin Urine Negative Negative    Ketones Urine Negative Negative mg/dL    Specific Gravity Urine 1.025 1.003 - 1.035    Blood Urine Trace (A) Negative    pH Urine 5.0 5.0 - " 7.0    Protein Albumin Urine Negative Negative mg/dL    Urobilinogen Urine 0.2 0.2, 1.0 E.U./dL    Nitrite Urine Negative Negative    Leukocyte Esterase Urine Trace (A) Negative   Urine Microscopic   Result Value Ref Range    Bacteria Urine Few (A) None Seen /HPF    RBC Urine 2-5 (A) 0-2 /HPF /HPF    WBC Urine 5-10 (A) 0-5 /HPF /HPF    Squamous Epithelials Urine Few (A) None Seen /LPF    Narrative    Urine Culture not indicated                   .  ..

## 2022-07-30 NOTE — PROGRESS NOTES
Knee injury, left, initial encounter  - XR Knee Left 3 Views  - Knee Supplies Order for DME - ONLY FOR DME    Rest the affected area as much as possible.  Apply ice for 15-20 minutes intermittently as needed and especially after any offending activity. Hot packs are better for muscle spasms and cramping. Daily stretching as tolerated.  As pain recedes, begin normal activities slowly as tolerated.  Consider Physical Therapy after 6 weeks if symptoms not better with conservative care.      Okay to take acetaminophen 500 mg- 2 tabs (Total of 1000 mg) every 8 hrs   Okay to take ibuprofen 200 mg- 3 tabs (Total of 600 mg) every 6 hours      Acute cystitis without hematuria  - cephALEXin (KEFLEX) 500 MG capsule; Take 1 capsule (500 mg) by mouth 4 times daily    30 minutes spent on the date of the encounter doing chart review, history and exam, documentation and further activities per the note    [facility]    Subjective   67 year old who presents to clinic today for the following health issues:    Urgent Care and Knee Pain       HPI     Pain History:  When did you first notice your pain? - Acute Pain   Where in your body do you have pain? Musculoskeletal problem/pain  Onset/Duration: 1 hour   Description  Location: Left knee   Joint Swelling: YES  Redness: No  Pain: YES  Warmth: No  Intensity:  moderate  Progression of Symptoms:  worsening  Accompanying signs and symptoms:   Fevers: No  Numbness/tingling/weakness: No  History  Trauma to the area: YES  Recent illness:  No  Previous similar problem: No  Previous evaluation:  No  Precipitating or alleviating factors:  Aggravating factors include: standing, walking and climbing stairs  Therapies tried and outcome: ice    Review of Systems   Review of Systems   See HPI     Objective    Temp: 98.4  F (36.9  C) Temp src: Tympanic BP: 138/85 Pulse: 96     SpO2: 95 %       Physical Exam   Physical Exam  Constitutional:       General: She  is not in acute distress.     Appearance: Normal appearance. She is not ill-appearing, toxic-appearing or diaphoretic.   HENT:      Head: Normocephalic and atraumatic.   Musculoskeletal:      Right knee: Normal.      Left knee: Swelling present. No deformity, erythema or lacerations. Normal range of motion. Tenderness present over the patellar tendon.   Neurological:      Mental Status: She is alert.   Psychiatric:         Mood and Affect: Mood normal.         Behavior: Behavior normal.         Thought Content: Thought content normal.         Judgment: Judgment normal.          Xray - Reviewed and interpreted by me.  No obvous signs of fracture or dislocation. Awaiting radiology report.

## 2022-07-30 NOTE — LETTER
July 30, 2022      To Whom It May Concern:      Hafsa Corona was seen in our urgent care today, 07/30/22. She suffered a left knee injury after a fall.  I expect her condition to improve over the next 1-2 weeks.  I don't want her lifting or squatting for the next 1-2 weeks.     Sincerely,        Bc Andres PA-C

## 2022-07-31 LAB — BACTERIA UR CULT: ABNORMAL

## 2022-08-08 ENCOUNTER — OFFICE VISIT (OUTPATIENT)
Dept: URGENT CARE | Facility: URGENT CARE | Age: 67
End: 2022-08-08
Payer: COMMERCIAL

## 2022-08-08 VITALS
SYSTOLIC BLOOD PRESSURE: 110 MMHG | DIASTOLIC BLOOD PRESSURE: 70 MMHG | TEMPERATURE: 98.3 F | HEART RATE: 102 BPM | OXYGEN SATURATION: 97 %

## 2022-08-08 DIAGNOSIS — L03.116 CELLULITIS OF LEFT LOWER EXTREMITY: ICD-10-CM

## 2022-08-08 DIAGNOSIS — T14.8XXA HEMATOMA OF SKIN: Primary | ICD-10-CM

## 2022-08-08 PROCEDURE — 99213 OFFICE O/P EST LOW 20 MIN: CPT | Performed by: PHYSICIAN ASSISTANT

## 2022-08-08 RX ORDER — CEPHALEXIN 500 MG/1
500 CAPSULE ORAL 4 TIMES DAILY
Qty: 28 CAPSULE | Refills: 0 | Status: SHIPPED | OUTPATIENT
Start: 2022-08-08 | End: 2022-08-15

## 2022-08-08 ASSESSMENT — ENCOUNTER SYMPTOMS
FREQUENCY: 0
ACTIVITY CHANGE: 0
DYSURIA: 0
DIARRHEA: 0
HEMATURIA: 0
BACK PAIN: 0
ABDOMINAL PAIN: 0
FEVER: 0
COLOR CHANGE: 1
CHILLS: 0
HEADACHES: 0
FATIGUE: 0
SHORTNESS OF BREATH: 0
NUMBNESS: 0
VOMITING: 0
JOINT SWELLING: 1
COUGH: 0
NAUSEA: 0
ARTHRALGIAS: 1
WEAKNESS: 0
FLANK PAIN: 0

## 2022-08-08 NOTE — PROGRESS NOTES
SUBJECTIVE:   Hafsa Corona is a 67 year old female presenting with a chief complaint of left knee injury. Patient had a fall last week where she fell on her left knee. She was seen in  where an Xray was performed showing no signs of fracture or dislocation. She has been rotating Tylenol/Ibuprofen with good pain control. She has normal, painless ROM of the knee. She is concerned due to significant edema on the anterolateral aspect of the left knee with erythema. She has ecchymosis in her feet, along the medial surface of the knee, and up the lateral thigh. She is anticoagulated on xarelto and was warned of this. She is here due to concern for tear or hematoma and wants to ensure she is healing properly. She denies hitting her head during the fall. She denies fevers, chills, fatigue, abdominal pain, nausea, vomiting, ongoing dysuria, flank pain, numbness/paresthesias, or loss of function of the knee. She has a history of tachycardia and T2DM on metformin with most recent A1C of 6/6% on 7/6/22. She is receiving chemotherapy for ovarian and renal cancer.     Chief Complaint   Patient presents with     Hematology     Pt was here last week for haven fallen, hurt between the shin and  knee, very red, swollen pt feels its very tight and is painful, pt is not on tylenol right now to see what it is like, pt has another concern, about the xrays and wants to know if a tear or break has been ruled out and why, more concerned about the look and feel rather than the pain and hs been icing, pt just wants to see if everything is healing properly       She is an established patient of [place].    Review of Systems   Constitutional: Negative for activity change, chills, fatigue and fever.   Respiratory: Negative for cough and shortness of breath.    Cardiovascular: Negative for chest pain.   Gastrointestinal: Negative for abdominal pain, diarrhea, nausea and vomiting.   Genitourinary: Negative for dysuria, flank pain, frequency  and hematuria.   Musculoskeletal: Positive for arthralgias and joint swelling. Negative for back pain and gait problem.   Skin: Positive for color change.   Neurological: Negative for weakness, numbness and headaches.       Past Medical History:   Diagnosis Date     Anxiety state, unspecified     [id number]     Arthritis     vague, gradual, not treated really, knees feel it     At high risk for breast cancer 2019    30.3% lifetime risk by SHERON model     Attention deficit disorder without mention of hyperactivity      Cancer (H) 2017 and 2018    ovarian and kidney cancers, both treated well     Chronic rhinitis      Depressive disorder lifetime    plus Anxiety plus ADD. Escitalipram, therapy, ADD meds maybe     Heart disease     tachycardia and high cholesterol, managed     History of blood transfusion 2017    while in hospital for ovarian cancer     Hypertension     tho BP was too LOW last week. past 12 years Generally OK     Panic disorder without agoraphobia      Pure hypercholesterolemia     Lipitor     Tachycardia, unspecified     1999-2004     Type II or unspecified type diabetes mellitus without mention of complication, not stated as uncontrolled     diagnosed      Family History   Problem Relation Age of Onset     C.A.D. Mother      Hypertension Mother      Breast Cancer Mother      Psychotic Disorder Mother      Colon Cancer Mother      Cancer Mother         Bone cancer     Coronary Artery Disease Mother      Hyperlipidemia Mother         treated w. statins     Other Cancer Mother         bone/marrow, ,  of this     Depression Mother      Anxiety Disorder Mother      Mental Illness Mother         various undiagnosed types, but THERE     Obesity Mother      Bone Cancer Mother      Other - See Comments Mother         psychotic disease      C.A.D. Father      Diabetes Father      Hypertension Father      Cerebrovascular Disease Father         1984 or       Psychotic Disorder Father      Pancreatic Cancer Father      Coronary Artery Disease Father      Hyperlipidemia Father         treated w statins     Other Cancer Father         pancreatic, ,  of this     Depression Father      Mental Illness Father         likely PTSD and depression     Obesity Father      Other - See Comments Father         cerebrovascular disease, psychotic disease      Psychotic Disorder Sister         x2     Neurologic Disorder Sister      Hypertension Sister      Hyperlipidemia Sister         treated w statins     Depression Sister      Anxiety Disorder Sister      Obesity Sister      Rashes/Skin Problems Sister         precancerous lesion on leg     Psychotic Disorder Sister      Other - See Comments Sister         small kidney stone      Hypertension Sister      Hyperlipidemia Sister         treated w statins     Depression Sister      Anxiety Disorder Sister      Other - See Comments Sister         psychotic disease      Prostate Problems Brother         cleared      Hypertension Brother      Hyperlipidemia Brother         unsure if treated w statins     Depression Brother      Anxiety Disorder Brother      Mental Illness Brother         undiagnosed but THERE     Obesity Brother      Psychotic Disorder Brother         x2     Depression Brother         major depressive disorder and OCD     Anxiety Disorder Brother      Mental Illness Brother         not sure of diagnoses, treated     Hypertension Brother      Hyperlipidemia Brother      Prostate Cancer Brother      Depression Brother      Anxiety Disorder Brother      Other - See Comments Brother         psychotic disease      Psychotic Disorder Maternal Grandmother         ?     Coronary Artery Disease Maternal Grandmother          of heart attack age 84?     Depression Maternal Grandmother      Psychotic Disorder Maternal Grandfather         ?     Psychotic Disorder Paternal Grandmother         Schizophernia     Coronary Artery  Disease Paternal Grandmother          of heart attack, age 85?     Depression Paternal Grandmother         severe, but recovered     Mental Illness Paternal Grandmother         possible bipolar or other     Schizophrenia Paternal Grandmother      Psychotic Disorder Paternal Grandfather         ?     Respiratory Paternal Grandfather          of emphysema and smoking     Emphysema Paternal Grandfather      Cancer - colorectal No family hx of      Glaucoma No family hx of      Macular Degeneration No family hx of      Current Outpatient Medications   Medication Sig Dispense Refill     Acetaminophen (TYLENOL PO) Take 500 mg by mouth 2 tabs prn pain (monthly)       ASPIRIN NOT PRESCRIBED (INTENTIONAL) Please choose reason not prescribed from choices below.       atorvastatin (LIPITOR) 80 MG tablet TAKE 1 TABLET(80 MG) BY MOUTH DAILY 90 tablet 3     B Complex Vitamins (VITAMIN B-COMPLEX PO) Take 50 mg by mouth        CALCIUM-MAGNESIUM-VITAMIN D PO Take 2 tablets by mouth daily       cephALEXin (KEFLEX) 500 MG capsule Take 1 capsule (500 mg) by mouth 4 times daily 20 capsule 0     Cholecalciferol (VITAMIN D) 1000 UNIT capsule Take 2,000 Units by mouth daily        DiphenhydrAMINE HCl (BENADRYL PO) Take 50 mg by mouth daily as needed (monthly)       EPINEPHrine (ANY BX GENERIC EQUIV) 0.3 MG/0.3ML injection 2-pack Inject 0.3 mLs (0.3 mg) into the muscle once as needed for anaphylaxis 0.3 mL PRN     escitalopram (LEXAPRO) 20 MG tablet Take 1 tablet (20 mg) by mouth daily 90 tablet 1     fluticasone (FLONASE) 50 MCG/ACT nasal spray SHAKE LIQUID AND USE 2 SPRAYS IN EACH NOSTRIL DAILY 48 g 3     LORazepam (ATIVAN) 1 MG tablet Take 1 tablet (1 mg) by mouth every 6 hours as needed (nausea/vomiting, anxiety or sleep) 30 tablet 0     Magnesium Hydroxide (MAGNESIA PO) Take 500 mg by mouth       metFORMIN (GLUCOPHAGE XR) 500 MG 24 hr tablet TAKE 2 TABLETS BY MOUTH EVERY DAY WITH THE EVENING MEAL 180 tablet 3     Multiple  Vitamins-Minerals (MULTIVITAMIN ADULT PO) Take 1 tablet by mouth daily       nitroFURantoin macrocrystal-monohydrate (MACROBID) 100 MG capsule TAKE 1 CAPSULE BY MOUTH AFTER INTERCOURSE AS NEEDED 30 capsule PRN     nystatin (MYCOSTATIN) 256263 UNIT/GM external powder Apply topically 2 times daily as needed Apply to folds under breast and abdomen. 60 g 1     Probiotic Product (PRO-BIOTIC BLEND PO) Take 1 capsule by mouth daily Enzymatic Therapy-probiotic pearls       rivaroxaban ANTICOAGULANT (XARELTO) 20 MG TABS tablet Take 1 tablet (20 mg) by mouth daily (with dinner) 30 tablet 0     tamoxifen (NOLVADEX) 10 MG tablet Cut each pill in 1/2 with pill cutter to ensure 5 mg daily dose. 45 tablet 3     traZODone (DESYREL) 50 MG tablet TAKE 1 TABLET  BY MOUTH EVERY NIGHT AS NEEDED FOR SLEEP 180 tablet PRN     Social History     Tobacco Use     Smoking status: Never Smoker     Smokeless tobacco: Never Used   Substance Use Topics     Alcohol use: Yes     Comment: Very infrequent, a bit of wine or beer 2x per month maybe       OBJECTIVE  /70 (BP Location: Right arm, Patient Position: Sitting, Cuff Size: Adult Regular)   Pulse 102   Temp 98.3  F (36.8  C) (Oral)   LMP 01/01/2009   SpO2 97%     Physical Exam  Constitutional:       General: She is not in acute distress.  HENT:      Head: Normocephalic and atraumatic.   Eyes:      Extraocular Movements: Extraocular movements intact.      Conjunctiva/sclera: Conjunctivae normal.   Pulmonary:      Effort: Pulmonary effort is normal.   Musculoskeletal:      Cervical back: Normal range of motion.      Comments: Gait normal. Visible edema to left anterolateral shin with overlying erythema. Ecchymosis described below. No tenderness over quadriceps tendon, patella, lateral/medial joint lines, foot, ankle or tib/fib. Tenderness over area of ecchymosis and erythema. Full, painless ROM of the knee.    Skin:     Comments: Ecchymosis along left medial knee, lateral thigh, and at  the base of 3-5 toes. Blanchable, warm erythema over the anterolateral shin. No discharge, crusting, or streaking.   Neurological:      General: No focal deficit present.      Mental Status: She is alert.       Labs:  No results found for this or any previous visit (from the past 24 hour(s)).    X-Ray was not done.    ASSESSMENT:    No diagnosis found.     Medical Decision Making:    Differential Diagnosis:  Hematoma vs effusion vs bursitis vs cellulitis     Serious Comorbid Conditions:  Ovarian / renal cancer  T2DM  Xarelto anticoagulation  Tachycardia     PLAN:    7 day course of keflex provided. Continue rest, heat, elevation, and OTC medications for pain control as needed. Strict return to care guidelines discussed with patient. All questions answered. Work note provided.  Discussed expected course.      Followup:    If not improving or if condition worsens, follow up with your Primary Care Provider/Emergency Department    There are no Patient Instructions on file for this visit.

## 2022-08-08 NOTE — LETTER
[facility] [place]  [address] [place] [address]  Phone: [phone]    August 8, 2022        Hafsa Corona  [address]  [place] [address]          To whom it may concern:    RE: Hafsa Corona    Patient was seen and treated today at our clinic and missed work.    Please contact me for questions or concerns.      Sincerely,        Alecia Schwab

## 2022-08-11 ENCOUNTER — OFFICE VISIT (OUTPATIENT)
Dept: HEMATOLOGY | Facility: CLINIC | Age: 67
End: 2022-08-11
Attending: INTERNAL MEDICINE
Payer: COMMERCIAL

## 2022-08-11 VITALS
DIASTOLIC BLOOD PRESSURE: 75 MMHG | HEIGHT: 63 IN | BODY MASS INDEX: 37.21 KG/M2 | WEIGHT: 210 LBS | HEART RATE: 98 BPM | TEMPERATURE: 98.1 F | RESPIRATION RATE: 18 BRPM | OXYGEN SATURATION: 95 % | SYSTOLIC BLOOD PRESSURE: 109 MMHG

## 2022-08-11 DIAGNOSIS — Z86.718 PERSONAL HISTORY OF DVT (DEEP VEIN THROMBOSIS): ICD-10-CM

## 2022-08-11 PROCEDURE — 99214 OFFICE O/P EST MOD 30 MIN: CPT | Performed by: INTERNAL MEDICINE

## 2022-08-11 PROCEDURE — G0463 HOSPITAL OUTPT CLINIC VISIT: HCPCS

## 2022-08-15 ENCOUNTER — MYC MEDICAL ADVICE (OUTPATIENT)
Dept: FAMILY MEDICINE | Facility: CLINIC | Age: 67
End: 2022-08-15

## 2022-08-16 NOTE — TELEPHONE ENCOUNTER
Writer responded via [employer].    TIMUR WoodN, RN  [facility]

## 2022-08-17 ENCOUNTER — OFFICE VISIT (OUTPATIENT)
Dept: URGENT CARE | Facility: URGENT CARE | Age: 67
End: 2022-08-17
Payer: COMMERCIAL

## 2022-08-17 VITALS
BODY MASS INDEX: 37.21 KG/M2 | OXYGEN SATURATION: 98 % | WEIGHT: 210 LBS | DIASTOLIC BLOOD PRESSURE: 80 MMHG | TEMPERATURE: 97.5 F | HEART RATE: 83 BPM | RESPIRATION RATE: 18 BRPM | SYSTOLIC BLOOD PRESSURE: 115 MMHG | HEIGHT: 63 IN

## 2022-08-17 DIAGNOSIS — L03.116 CELLULITIS OF LEFT LOWER EXTREMITY: Primary | ICD-10-CM

## 2022-08-17 PROCEDURE — 99214 OFFICE O/P EST MOD 30 MIN: CPT | Performed by: NURSE PRACTITIONER

## 2022-08-17 RX ORDER — DOXYCYCLINE 100 MG/1
100 TABLET ORAL 2 TIMES DAILY
Qty: 20 TABLET | Refills: 0 | Status: SHIPPED | OUTPATIENT
Start: 2022-08-17 | End: 2022-08-27

## 2022-08-18 ENCOUNTER — APPOINTMENT (OUTPATIENT)
Dept: MRI IMAGING | Facility: CLINIC | Age: 67
End: 2022-08-18
Attending: EMERGENCY MEDICINE
Payer: COMMERCIAL

## 2022-08-18 ENCOUNTER — APPOINTMENT (OUTPATIENT)
Dept: GENERAL RADIOLOGY | Facility: CLINIC | Age: 67
End: 2022-08-18
Attending: EMERGENCY MEDICINE
Payer: COMMERCIAL

## 2022-08-18 ENCOUNTER — APPOINTMENT (OUTPATIENT)
Dept: ULTRASOUND IMAGING | Facility: CLINIC | Age: 67
End: 2022-08-18
Attending: EMERGENCY MEDICINE
Payer: COMMERCIAL

## 2022-08-18 ENCOUNTER — HOSPITAL ENCOUNTER (EMERGENCY)
Facility: CLINIC | Age: 67
Discharge: HOME | End: 2022-08-19
Attending: EMERGENCY MEDICINE | Admitting: EMERGENCY MEDICINE
Payer: COMMERCIAL

## 2022-08-18 VITALS
DIASTOLIC BLOOD PRESSURE: 79 MMHG | OXYGEN SATURATION: 96 % | SYSTOLIC BLOOD PRESSURE: 116 MMHG | TEMPERATURE: 98.3 F | HEART RATE: 104 BPM

## 2022-08-18 DIAGNOSIS — T14.8XXA HEMATOMA OF SKIN: ICD-10-CM

## 2022-08-18 DIAGNOSIS — T14.8XXA MOREL LAVALLEE LESION: Primary | ICD-10-CM

## 2022-08-18 LAB
ANION GAP SERPL CALCULATED.3IONS-SCNC: 13 MMOL/L (ref 7–15)
BASOPHILS # BLD AUTO: 0 10E3/UL (ref 0–0.2)
BASOPHILS NFR BLD AUTO: 1 %
BUN SERPL-MCNC: 19.7 MG/DL (ref 8–23)
CALCIUM SERPL-MCNC: 9.1 MG/DL (ref 8.8–10.2)
CHLORIDE SERPL-SCNC: 107 MMOL/L (ref 98–107)
CREAT SERPL-MCNC: 1.1 MG/DL (ref 0.51–0.95)
CRP SERPL-MCNC: <3 MG/L
DEPRECATED HCO3 PLAS-SCNC: 22 MMOL/L (ref 22–29)
EOSINOPHIL # BLD AUTO: 0.1 10E3/UL (ref 0–0.7)
EOSINOPHIL NFR BLD AUTO: 2 %
ERYTHROCYTE [DISTWIDTH] IN BLOOD BY AUTOMATED COUNT: 14 % (ref 10–15)
ERYTHROCYTE [SEDIMENTATION RATE] IN BLOOD BY WESTERGREN METHOD: 29 MM/HR (ref 0–30)
GFR SERPL CREATININE-BSD FRML MDRD: 54 ML/MIN/1.73M2
GLUCOSE SERPL-MCNC: 151 MG/DL (ref 70–99)
HCT VFR BLD AUTO: 32.9 % (ref 35–47)
HGB BLD-MCNC: 10.4 G/DL (ref 11.7–15.7)
IMM GRANULOCYTES # BLD: 0 10E3/UL
IMM GRANULOCYTES NFR BLD: 0 %
LYMPHOCYTES # BLD AUTO: 2.1 10E3/UL (ref 0.8–5.3)
LYMPHOCYTES NFR BLD AUTO: 35 %
MCH RBC QN AUTO: 28 PG (ref 26.5–33)
MCHC RBC AUTO-ENTMCNC: 31.6 G/DL (ref 31.5–36.5)
MCV RBC AUTO: 89 FL (ref 78–100)
MONOCYTES # BLD AUTO: 0.5 10E3/UL (ref 0–1.3)
MONOCYTES NFR BLD AUTO: 9 %
NEUTROPHILS # BLD AUTO: 3.2 10E3/UL (ref 1.6–8.3)
NEUTROPHILS NFR BLD AUTO: 53 %
NRBC # BLD AUTO: 0 10E3/UL
NRBC BLD AUTO-RTO: 0 /100
PLATELET # BLD AUTO: 366 10E3/UL (ref 150–450)
POTASSIUM SERPL-SCNC: 3.7 MMOL/L (ref 3.4–5.3)
PROCALCITONIN SERPL IA-MCNC: 0.02 NG/ML
RBC # BLD AUTO: 3.71 10E6/UL (ref 3.8–5.2)
SODIUM SERPL-SCNC: 142 MMOL/L (ref 136–145)
WBC # BLD AUTO: 6 10E3/UL (ref 4–11)

## 2022-08-18 PROCEDURE — 73720 MRI LWR EXTREMITY W/O&W/DYE: CPT | Mod: LT

## 2022-08-18 PROCEDURE — 84145 PROCALCITONIN (PCT): CPT | Performed by: EMERGENCY MEDICINE

## 2022-08-18 PROCEDURE — 73590 X-RAY EXAM OF LOWER LEG: CPT | Mod: LT

## 2022-08-18 PROCEDURE — 73590 X-RAY EXAM OF LOWER LEG: CPT | Mod: 26 | Performed by: RADIOLOGY

## 2022-08-18 PROCEDURE — 86140 C-REACTIVE PROTEIN: CPT | Performed by: EMERGENCY MEDICINE

## 2022-08-18 PROCEDURE — 93971 EXTREMITY STUDY: CPT | Mod: LT

## 2022-08-18 PROCEDURE — 85610 PROTHROMBIN TIME: CPT | Performed by: EMERGENCY MEDICINE

## 2022-08-18 PROCEDURE — 36415 COLL VENOUS BLD VENIPUNCTURE: CPT | Performed by: EMERGENCY MEDICINE

## 2022-08-18 PROCEDURE — 80048 BASIC METABOLIC PNL TOTAL CA: CPT | Performed by: EMERGENCY MEDICINE

## 2022-08-18 PROCEDURE — 85730 THROMBOPLASTIN TIME PARTIAL: CPT | Performed by: EMERGENCY MEDICINE

## 2022-08-18 PROCEDURE — 99285 EMERGENCY DEPT VISIT HI MDM: CPT | Performed by: EMERGENCY MEDICINE

## 2022-08-18 PROCEDURE — 85652 RBC SED RATE AUTOMATED: CPT | Performed by: EMERGENCY MEDICINE

## 2022-08-18 PROCEDURE — 99285 EMERGENCY DEPT VISIT HI MDM: CPT | Mod: 25 | Performed by: EMERGENCY MEDICINE

## 2022-08-18 PROCEDURE — C9803 HOPD COVID-19 SPEC COLLECT: HCPCS | Performed by: EMERGENCY MEDICINE

## 2022-08-18 PROCEDURE — U0005 INFEC AGEN DETEC AMPLI PROBE: HCPCS | Performed by: EMERGENCY MEDICINE

## 2022-08-18 PROCEDURE — A9585 GADOBUTROL INJECTION: HCPCS | Performed by: EMERGENCY MEDICINE

## 2022-08-18 PROCEDURE — 93971 EXTREMITY STUDY: CPT | Mod: 26 | Performed by: RADIOLOGY

## 2022-08-18 PROCEDURE — 85025 COMPLETE CBC W/AUTO DIFF WBC: CPT | Performed by: EMERGENCY MEDICINE

## 2022-08-18 PROCEDURE — 73720 MRI LWR EXTREMITY W/O&W/DYE: CPT | Mod: 26 | Performed by: RADIOLOGY

## 2022-08-18 PROCEDURE — 255N000002 HC RX 255 OP 636: Performed by: EMERGENCY MEDICINE

## 2022-08-18 RX ORDER — OXYCODONE HYDROCHLORIDE 5 MG/1
5 TABLET ORAL ONCE
Status: DISCONTINUED | OUTPATIENT
Start: 2022-08-18 | End: 2022-08-19 | Stop reason: HOSPADM

## 2022-08-18 RX ORDER — GADOBUTROL 604.72 MG/ML
10 INJECTION INTRAVENOUS ONCE
Status: COMPLETED | OUTPATIENT
Start: 2022-08-18 | End: 2022-08-18

## 2022-08-18 RX ADMIN — GADOBUTROL 10 ML: 604.72 INJECTION INTRAVENOUS at 22:53

## 2022-08-18 ASSESSMENT — ENCOUNTER SYMPTOMS
NUMBNESS: 0
NECK PAIN: 0
COUGH: 0
FEVER: 0
COLOR CHANGE: 1
BRUISES/BLEEDS EASILY: 1
PALPITATIONS: 0
WEAKNESS: 0
HEADACHES: 0
SHORTNESS OF BREATH: 0
WOUND: 0
CONFUSION: 0
NAUSEA: 0
FATIGUE: 0
CHILLS: 0
LIGHT-HEADEDNESS: 0
VOMITING: 0
RHINORRHEA: 0
BACK PAIN: 0

## 2022-08-18 ASSESSMENT — ACTIVITIES OF DAILY LIVING (ADL)
ADLS_ACUITY_SCORE: 35

## 2022-08-18 NOTE — ED PROVIDER NOTES
[place] EMERGENCY DEPARTMENT ([facility])  August 18, 2022    History     Chief Complaint   Patient presents with     Cellulitis     HPI  Hafsa Corona is a 67 year old female who with a past medical history significant for type 2 diabetes mellitus, HTN, ovarian cancer, peripheral neuropathy due to chemotherapy, deep vein thrombosis on Xarelto, chronic kidney disease staged 3 who presents to the Emergency Department for evaluation of left shin pain and swelling.  She notes that 2 weeks ago she fell onto cement area and had a scraping-like mechanism to the left shin.  She had imaging at outside hospital that showed no acute fracture.  Over the next week she developed redness and swelling to the area and was started on doxycycline by urgent care provider.  Today she noticed increased swelling in the area, was told to come here to the emergency department with concern for worsening cellulitis.  She is on anticoagulation, has been taking this as prescribed. The patient denies any other physical concerns today.     Past Medical History  Past Medical History:   Diagnosis Date     Anxiety state, unspecified     [id number]     Arthritis 2014    vague, gradual, not treated really, knees feel it     At high risk for breast cancer 09/27/2019    30.3% lifetime risk by SHERON model     Attention deficit disorder without mention of hyperactivity      Cancer (H) 7/2017 and 1/2018    ovarian and kidney cancers, both treated well     Chronic rhinitis      Depressive disorder lifetime    plus Anxiety plus ADD. Escitalipram, therapy, ADD meds maybe     Heart disease 1999    tachycardia and high cholesterol, managed     History of blood transfusion 07/2017    while in hospital for ovarian cancer     Hypertension 1999    tho BP was too LOW last week. past 12 years Generally OK     Panic disorder without agoraphobia      Pure hypercholesterolemia     Lipitor     Tachycardia, unspecified     9/1999-2/2004     Type II or  unspecified type diabetes mellitus without mention of complication, not stated as uncontrolled     diagnosed 2004     Past Surgical History:   Procedure Laterality Date     ABSCESS DRAINAGE Left     left leg      AS REMV KIDNEY,RADICAL Right      BIOPSY  7/2017 and 2/2018    ovarian and kidney cancer biopsies. also 1987 breast benign     BREAST CYST EXCISION  1985     BREAST SURGERY  1987    date unsure. benign breast cyst removed     CATARACT IOL, RT/LT Left 05/18/2018     CATARACT IOL, RT/LT Right 07/1318     COLONOSCOPY  2008 and 2013    polyps removed. Next due fall 2018     ENDOMETRIAL ABLATION  2008     EXCISE MASS BACK N/A 12/6/2021    Procedure: EXCISION, MASS, BACK;  Surgeon: Norris Vela MD;  Location: [facility] OR     HYSTERECTOMY TOTAL ABDOMINAL, BILATERAL SALPINGO-OOPHORECTOMY, NODE DISSECTION, COMBINED Left 7/7/2017    Procedure: COMBINED HYSTERECTOMY TOTAL ABDOMINAL, SALPINGO-OOPHORECTOMY, NODE DISSECTION;  Exploratory Laparotomy, Left Salpingo-Oophorectomy, Cancer Staging, Total Hysterectomy, Omentectomy, Evacuation of abdominal fluid, Lymph Node Dissection  Anesthesia Block ;  Surgeon: Serena Cole MD;  Location: [facility] OR     HYSTERECTOMY, PAP NO LONGER INDICATED  07/07/2017    Laparotomy; for ovarian cancer staging     HYSTERECTOMY, PAP NO LONGER INDICATED       INSERT PORT VASCULAR ACCESS Right 8/21/2017    Procedure: INSERT PORT VASCULAR ACCESS;  Single Lumen Chest Power Port;  Surgeon: Stephen Mike PA-C;  [facility]     IR PORT REMOVAL RIGHT  11/12/2021     LYMPHADENECTOMY RETROPERITONEAL Bilateral 07/07/2017    Laparotomy; pelvics & para-aortics; for ovarian cancer staging     OMENTECTOMY  07/07/2017    Laparotomy; for ovarian cancer staging     ORTHOPEDIC SURGERY  1/2002    broken left jaw due to fall, 4 fractures, titanium plates     OTHER SURGICAL HISTORY  01/2002    OTHER SURGICAL HISTORYfacial surgery due to fall      PHACOEMULSIFICATION CLEAR CORNEA WITH STANDARD  INTRAOCULAR LENS IMPLANT Right 7/13/2018    Procedure: PHACOEMULSIFICATION CLEAR CORNEA WITH STANDARD INTRAOCULAR LENS IMPLANT;  RIght Eye Phacoemulsification Clear Cornea with Standard Intraocular Lens Placement ;  Surgeon: Mary Jo Massey MD;  [facility]     PHACOEMULSIFICATION CLEAR CORNEA WITH TORIC INTRAOCULAR LENS IMPLANT Left 5/18/2018    Procedure: PHACOEMULSIFICATION CLEAR CORNEA WITH TORIC INTRAOCULAR LENS IMPLANT;  Left Eye Phacoemulsification with Toric Lens;  Surgeon: Mary Jo Massey MD;  [facility]     REMOVE PORT VASCULAR ACCESS Right 11/12/2021    Procedure: REMOVAL, VASCULAR ACCESS PORT right;  Surgeon: Afsaneh Das PA-C;  Location: [facility] OR     SALPINGO OOPHORECTOMY,R/L/KAYY Right 2008    Salpingo Oophorectomy, RT     SALPINGO OOPHORECTOMY,R/L/KAYY Left 07/07/2017    Laparotomy; for ovarian cancer staging     SALPINGOOPHORECTOMY  2008     SURGICAL HISTORY OF -       facial surgery d/t fall in 1/2002     SURGICAL HISTORY OF -       1985 removal of breast cyst     SURGICAL HISTORY OF -   2008    endometrial ablation     YAG CAPSULOTOMY OD (RIGHT EYE)  10/22/2018     Acetaminophen (TYLENOL PO)  ASPIRIN NOT PRESCRIBED (INTENTIONAL)  atorvastatin (LIPITOR) 80 MG tablet  B Complex Vitamins (VITAMIN B-COMPLEX PO)  CALCIUM-MAGNESIUM-VITAMIN D PO  cephALEXin (KEFLEX) 500 MG capsule  Cholecalciferol (VITAMIN D) 1000 UNIT capsule  DiphenhydrAMINE HCl (BENADRYL PO)  doxycycline monohydrate (ADOXA) 100 MG tablet  EPINEPHrine (ANY BX GENERIC EQUIV) 0.3 MG/0.3ML injection 2-pack  escitalopram (LEXAPRO) 20 MG tablet  fluticasone (FLONASE) 50 MCG/ACT nasal spray  LORazepam (ATIVAN) 1 MG tablet  Magnesium Hydroxide (MAGNESIA PO)  metFORMIN (GLUCOPHAGE XR) 500 MG 24 hr tablet  Multiple Vitamins-Minerals (MULTIVITAMIN ADULT PO)  nitroFURantoin macrocrystal-monohydrate (MACROBID) 100 MG capsule  nystatin (MYCOSTATIN) [id number] UNIT/GM external powder  Probiotic Product (PRO-BIOTIC BLEND PO)  rivaroxaban  ANTICOAGULANT (XARELTO) 20 MG TABS tablet  tamoxifen (NOLVADEX) 10 MG tablet  traZODone (DESYREL) 50 MG tablet      Allergies   Allergen Reactions     Paclitaxel Anaphylaxis and Difficulty breathing     Wasps [Hornets] Anaphylaxis     Yellow Hornet Venom [Hornet Venom] Anaphylaxis and Swelling     Yellow Jacket Venom [Venomil Honey Bee] Anaphylaxis and Swelling     Sulfa Drugs Hives and Rash     Family History  Family History   Problem Relation Age of Onset     C.A.D. Mother      Hypertension Mother      Breast Cancer Mother      Psychotic Disorder Mother      Colon Cancer Mother      Cancer Mother         Bone cancer     Coronary Artery Disease Mother      Hyperlipidemia Mother         treated w. statins     Other Cancer Mother         bone/marrow, ,  of this     Depression Mother      Anxiety Disorder Mother      Mental Illness Mother         various undiagnosed types, but THERE     Obesity Mother      Bone Cancer Mother      Other - See Comments Mother         psychotic disease      C.A.D. Father      Diabetes Father      Hypertension Father      Cerebrovascular Disease Father         1984 or      Psychotic Disorder Father      Pancreatic Cancer Father      Coronary Artery Disease Father      Hyperlipidemia Father         treated w statins     Other Cancer Father         pancreatic, ,  of this     Depression Father      Mental Illness Father         likely PTSD and depression     Obesity Father      Other - See Comments Father         cerebrovascular disease, psychotic disease      Psychotic Disorder Sister         x2     Neurologic Disorder Sister      Hypertension Sister      Hyperlipidemia Sister         treated w statins     Depression Sister      Anxiety Disorder Sister      Obesity Sister      Rashes/Skin Problems Sister         precancerous lesion on leg     Psychotic Disorder Sister      Other - See Comments Sister         small kidney stone      Hypertension Sister       Hyperlipidemia Sister         treated w statins     Depression Sister      Anxiety Disorder Sister      Other - See Comments Sister         psychotic disease      Prostate Problems Brother         cleared      Hypertension Brother      Hyperlipidemia Brother         unsure if treated w statins     Depression Brother      Anxiety Disorder Brother      Mental Illness Brother         undiagnosed but THERE     Obesity Brother      Psychotic Disorder Brother         x2     Depression Brother         major depressive disorder and OCD     Anxiety Disorder Brother      Mental Illness Brother         not sure of diagnoses, treated     Hypertension Brother      Hyperlipidemia Brother      Prostate Cancer Brother      Depression Brother      Anxiety Disorder Brother      Other - See Comments Brother         psychotic disease      Psychotic Disorder Maternal Grandmother         ?     Coronary Artery Disease Maternal Grandmother          of heart attack age 84?     Depression Maternal Grandmother      Psychotic Disorder Maternal Grandfather         ?     Psychotic Disorder Paternal Grandmother         Schizophernia     Coronary Artery Disease Paternal Grandmother          of heart attack, age 85?     Depression Paternal Grandmother         severe, but recovered     Mental Illness Paternal Grandmother         possible bipolar or other     Schizophrenia Paternal Grandmother      Psychotic Disorder Paternal Grandfather         ?     Respiratory Paternal Grandfather          of emphysema and smoking     Emphysema Paternal Grandfather      Cancer - colorectal No family hx of      Glaucoma No family hx of      Macular Degeneration No family hx of      Social History   Social History     Tobacco Use     Smoking status: Never Smoker     Smokeless tobacco: Never Used   Substance Use Topics     Alcohol use: Yes     Comment: Very infrequent, a bit of wine or beer 2x per month maybe     Drug use: Yes     Types: Marijuana      Comment: infrequent, for celebrations only, maybe 8x per year      Past medical history, past surgical history, medications, allergies, family history, and social history were reviewed with the patient. No additional pertinent items.       Review of Systems   Constitutional: Negative for chills, fatigue and fever.   HENT: Negative for congestion, ear pain and rhinorrhea.    Eyes: Negative for visual disturbance.   Respiratory: Negative for cough and shortness of breath.    Cardiovascular: Negative for chest pain and palpitations.   Gastrointestinal: Negative for nausea and vomiting.   Musculoskeletal: Negative for back pain and neck pain.        Positive for sharp pain to the left shin.   Skin: Positive for color change. Negative for rash and wound.   Allergic/Immunologic: Negative for immunocompromised state.   Neurological: Negative for syncope, weakness, light-headedness, numbness and headaches.   Hematological: Bruises/bleeds easily.   Psychiatric/Behavioral: Negative for confusion.   All other systems reviewed and are negative.    A complete review of systems was performed with pertinent positives and negatives noted in the HPI, and all other systems negative.    Physical Exam   BP: 116/79  Pulse: 104  Temp: 98.2  F (36.8  C)  SpO2: 96 %     Physical Exam  Vitals and nursing note reviewed.   Constitutional:       General: She is not in acute distress.     Appearance: She is normal weight. She is not ill-appearing, toxic-appearing or diaphoretic.   HENT:      Head: Normocephalic and atraumatic.      Right Ear: External ear normal.      Left Ear: External ear normal.      Nose: Nose normal.      Mouth/Throat:      Mouth: Mucous membranes are moist.   Eyes:      Extraocular Movements: Extraocular movements intact.      Conjunctiva/sclera: Conjunctivae normal.      Pupils: Pupils are equal, round, and reactive to light.   Cardiovascular:      Rate and Rhythm: Normal rate and regular rhythm.      Pulses: Normal  pulses.      Heart sounds: Normal heart sounds. No murmur heard.    No friction rub. No gallop.   Pulmonary:      Effort: Pulmonary effort is normal. No respiratory distress.      Breath sounds: Normal breath sounds. No stridor. No wheezing, rhonchi or rales.   Abdominal:      General: Bowel sounds are normal. There is no distension.      Palpations: Abdomen is soft.      Tenderness: There is no abdominal tenderness. There is no right CVA tenderness, left CVA tenderness, guarding or rebound.   Musculoskeletal:         General: Normal range of motion.      Cervical back: Normal range of motion and neck supple. No rigidity or tenderness.      Right lower leg: No edema.      Comments: Examination of the left lower extremity shows significant edema with mild ecchymosis/erythema to the left anterolateral shin.  No obvious fluctuance although significant swelling/edema in this area.  No florentin pain with range of motion of the left knee or ankle.  No crepitus.   Skin:     General: Skin is warm.      Capillary Refill: Capillary refill takes less than 2 seconds.   Neurological:      General: No focal deficit present.      Mental Status: She is alert.      Comments: Intact sensation and strength in the left lower extremity.   Psychiatric:         Mood and Affect: Mood normal.         Behavior: Behavior normal.         ED Course     ED Course as of 08/18/22 2236   Thu Aug 18, 2022   2026 Creatinine(!): 1.10  On chart review, this is at baseline.    2026 Sodium: 142   2026 Potassium: 3.7   2026 Glucose(!): 151   2026 WBC: 6.0   2026 Hemoglobin(!): 10.4  11.5 9 months ago on chart review.    2027 Platelet Count: 366   2027 US Lower Extremity Venous Duplex Left  1.  No evidence left lower extremity deep venous thrombosis.  2.  Large mildly complex hypoechoic collection in the left lateral calf interposed between the subcutaneous fat and muscle layer. The appearance of the lesion and its well-defined location between the fat and  muscle is suspicious for a Frank Joanne lesion. Recommend clarification of patient's knee injury as this type of injury is typically caused by a shearing mechanism. Differential also includes hematoma and less likely abscess in the appropriate clinical setting. Nonurgent outpatient MRI could be considered for further evaluation.   2027 XR Tibia and Fibula Left 2 Views  Tibia and fibula negative. No evidence of osteomyelitis.   2130 Spoke with orthopedics who recommended MRI for further evaluation.  Updated the patient on this plan.  All questions answered.   2151 CRP Inflammation: <3.00   2200 Procalcitonin: 0.02     Procedures: none.        The medical record was reviewed and interpreted.  Current labs reviewed and interpreted.  Current images reviewed and interpreted: As above.    Medications   oxyCODONE (ROXICODONE) tablet 5 mg (has no administration in time range)   gadobutrol (GADAVIST) injection 10 mL (has no administration in time range)        Assessments & Plan (with Medical Decision Making)   Nursing notes and vital signs reviewed.     Presentation, exam, and workup is most consistent with concern for Frank Joanne lesion to the left shin.    Please see HPI, ROS, exam, and ED course above for pertinent history and findings. In short, the patient presented to the ED for evaluation of initial concern for cellulitis to the left shin however on exam does not appear frankly cellulitic.  Ultrasound ordered in triage shows concern for Frank Joanne lesion, spoke with orthopedics who recommended MRI for further evaluation.      MRI is pending at the time of signout.  Plan at the time of signout will be for reaching back out to orthopedics after MRI result.    Otherwise, she is afebrile, exam does not appear cellulitic, CRP and procalcitonin are undetectable, overall lower suspicion for cellulitis or deep space infection and no indication for antibiotics prior to MRI results.    The patient's care was signed  out to Dr. Damon at the end of my shift; please see their note for final diagnosis and disposition.      Final diagnoses:   Frank Joanne lesion       --  Beverly Carvalho MD   [facility] EMERGENCY DEPARTMENT  8/18/2022          Beverly Carvalho MD  08/18/22 [id number]

## 2022-08-18 NOTE — PROGRESS NOTES
"Chief Complaint   Patient presents with     Urgent Care     Cellulitis     Lt leg cellulitis since last time visit, pt needs more medication, or may be referral.          ICD-10-CM    1. Cellulitis of left lower extremity  L03.116 doxycycline monohydrate (ADOXA) 100 MG tablet   Patient is taking anticoagulants otherwise clot may be a concern.  Will switch patient to medication that has MRSA coverage.  Erythema was outlined and patient was instructed to go to the emergency room if erythema moves beyond this line.  She expressed understanding and will start new medication today.      31 minutes spent on the date of the encounter doing chart review, history and exam, documentation and further activities per the note      Subjective     Hafsa Corona is an 67 year old female who presents to clinic today for follow-up of cellulitis to the left lower leg.  She was seen on 8/8/2022 and given Rx for Keflex for a week.  Symptoms did improve somewhat but she is about to finish the medication and she still has quite a bit of redness and swelling.  She denies any fever, chills, increased pain with ambulation.    Objective    /80   Pulse 83   Temp 97.5  F (36.4  C) (Temporal)   Resp 18   Ht 1.6 m (5' 3\")   Wt 95.3 kg (210 lb)   LMP 01/01/2009   SpO2 98%   BMI 37.20 kg/m    Nurses notes and VS have been reviewed.    Physical Exam       GENERAL APPEARANCE: healthy appearing, alert     MS: extremities normal- no gross deformities noted; normal muscle tone.     SKIN: Large area of erythema starting at the tibial plateau and going down to the mid shin, area is slightly tender to the touch hot and has pitting edema     NEURO: Normal strength and tone, mentation intact and speech normal     PSYCH: normal thought process; no significant mood disturbance    Patient Instructions   Monitor redness for increasing size and if this occurs go to the Emergency Room.    Hold any products containing calcium while taking these " antibiotics.      MARLON Posada, CNP  [facility] Provider    The use of Dragon/[employer] dictation services may have been used to construct the content in this note; any grammatical or spelling errors are non-intentional. Please contact the author of this note directly if you are in need of any clarification.

## 2022-08-18 NOTE — PROGRESS NOTES
[facility]  [address] [place] [address], [facility] [address], [place] [address]  Phone: [phone], Fax: [phone].      Outpatient Visit Note:    Patient: Hafsa Corona  MRN: [id number]  : 1955  CLIFF: Aug 11, 2022      Reason for Visit:  Follow up for distal DVT, cancer-associated    Assessment:  In summary, Hafsa Corona is a 67 year old woman with cancer-associated distal DVT, now in remission 5 years after treatment with surgery and chemotherapy but continues on secondary prophylaxis with rivaroxaban.  We again had a discussion today about risks and benefits of continued secondary prophylaxis.  She has had no bleeding complications and feels comfortable remaining on therapy she worries about the risk of recurrent VTE with cancer recurrence.  I agree that the risk of continued anticoagulation is minimal.  Given that she is 5 years out from cancer without sign of recurrence I would be comfortable at any point with her stopping her secondary prophylaxis.  We will readdress this again in 1 years time.    Plan:  1. Majority of today's visit was spent counseling the patient regarding cancer its association with risk for VTE.  2. Continue rivaroxaban 20mg daily for secondary prophylaxis.  3. For interruption of anticoagulation, she should stop therapy 1 day prior to the minor procedure or 2 days before a major surgical procedure.  She may resume anticoagulation at discussion of her surgeon.  4. RTC 1 year and call with questions or concerns in the meantime        The patient is given our center's contact information and is instructed to call if she should have any further questions or concerns.  Otherwise, we will plan on seeing her back in 1 year.    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  She is doing well with no complaints. She notes no bleeding associated with her use of anticoagulation.  She notes no leg swelling or pain.  No chest pain, dyspnea on exertion.      ROS:  A 14 point ROS is negative except as stated in the HPI    Objective:  Exam:  Constitutional: Appears well, no distress  Eyes: no discharge, injection or icterus  Respiratory: no cough or labored breathing  Skin: no rashes or petechiae  Neurological: no deficits appreciated, speech is fluent  Psych: affect is normal    Labs:   Latest Reference Range & Units 07/06/22 11:25   Creatinine 0.52 - 1.04 mg/dL 1.10 (H)   (H): Data is abnormally high    Imaging:  none

## 2022-08-18 NOTE — ED PROVIDER NOTES
ED Triage Provider Note  [facility]  Encounter Date: Aug 18, 2022    History:  No chief complaint on file.    Hafsa Corona is a 67 year old female who presents to the ED with left leg swelling and redness.  She had had this for 10 days or more.  Started on Keflex and finished 7 days.  Then noted increased swelling today.  Patient was seen at urgent care yesterday and started on doxycycline and has taken 2 doses of the doxycycline and now comes in because of increased swelling of the left leg.  Is on Xarelto, so less likely to be DVT.  She is concerned that her cellulitis is not responding to the antibiotics.  No fever, chest pain, cough, shortness of breath.     Review of Systems:  No lower extremity numbness or    Exam:  /79   Pulse 104   Temp 98.2  F (36.8  C) (Oral)   LMP 01/01/2009   SpO2 96%   General: No acute distress. Appears stated age.   Cardio: Regular rate, extremities well perfused  Resp: Normal work of breathing, grossly normal respiratory rate  Neuro: Alert. CN II-XII grossly intact. Grossly intact strength.       Medical Decision Making:  Patient arriving to the ED with problem as above. A medical screening exam was performed.  Ultrasound, x-ray, labs orders initiated from Triage. The patient is appropriate to wait in triage.      Malia Ruby MD on 8/18/2022 at 5:25 PM     Malia Ruby MD  08/18/22 [id number]

## 2022-08-18 NOTE — PATIENT INSTRUCTIONS
Monitor redness for increasing size and if this occurs go to the Emergency Room.    Hold any products containing calcium while taking these antibiotics.

## 2022-08-18 NOTE — ED TRIAGE NOTES
Patient had recent knee injury to left leg x3 weeks ago. Had significant bruising d/t being on Xarelto. Dx with cellulitis to area x1.5 weeks ago put on oral antibiotics. Marked area of where cellulitis was - significantly less redness compared to marking. Patient came in today d/t swelling of the area. Area swollen, pink, and slight warmth in the center

## 2022-08-19 LAB
APTT PPP: 24 SECONDS (ref 22–38)
INR PPP: 1.49 (ref 0.85–1.15)
SARS-COV-2 RNA RESP QL NAA+PROBE: NEGATIVE

## 2022-08-19 ASSESSMENT — ACTIVITIES OF DAILY LIVING (ADL): ADLS_ACUITY_SCORE: 35

## 2022-08-19 NOTE — CONSULTS
[facility]  ORTHOPAEDIC SURGERY CONSULT - Consult     DATE OF CONSULT: 8/19/2022 1:14 AM    REQUESTING PROVIDER: Kelli Damon MD, MD - [facility] Staff.    CC: Left leg injuy    DATE OF INJURY: 7/30/22    HISTORY OF PRESENT ILLNESS:   The orthopaedic surgery service was consulted by Kelli Murray MD for evaluation and treatment recommendations of left leg injury.    Hafsa Corona is a 67 year old female with pmhx type 2 diabetes, hyperlipidemia, hypertension, CAD, currently on aspirin and Xarelto who sustained a fall on 7/30/22. She fell directly onto the left knee (not a sheering mechanism).  She noticed increasing swelling of the next several hours and presented to urgent care where an x-ray was done and found to be negative.  Over the next 3 weeks she continued to have swelling of the anterior proximal tibia as well as pain and erythema and skin changes.  Given the erythema she again presented to urgent care where she was diagnosed with cellulitis and given a course of antibiotics and she noted to have improvement in the erythema.  She still continues to have skin changes consistent with bruising and/or resolving cellulitis.  She has not used any compressive stockings or applied compressive therapy.  She has been using Tylenol for pain control which has been effective in managing her pain.    Denies numbness, tingling, or weakness to the affected extremities.  Denies fevers, chills, nausea, vomiting, diarrhea, constipation, chest pain, shortness of breath.    PAST MEDICAL HISTORY:   Past Medical History:   Diagnosis Date     Anxiety state, unspecified     [id number]     Arthritis 2014    vague, gradual, not treated really, knees feel it     At high risk for breast cancer 09/27/2019    30.3% lifetime risk by SHERON model     Attention deficit disorder without mention of hyperactivity      Cancer (H) 7/2017 and 1/2018    ovarian and kidney cancers, both treated well     Chronic rhinitis       Depressive disorder lifetime    plus Anxiety plus ADD. Escitalipram, therapy, ADD meds maybe     Heart disease 1999    tachycardia and high cholesterol, managed     History of blood transfusion 07/2017    while in hospital for ovarian cancer     Hypertension 1999    tho BP was too LOW last week. past 12 years Generally OK     Panic disorder without agoraphobia      Pure hypercholesterolemia     Lipitor     Tachycardia, unspecified     9/1999-2/2004     Type II or unspecified type diabetes mellitus without mention of complication, not stated as uncontrolled     diagnosed 2004     [Patient denies any personal history of bleeding disorders, clotting disorders, or adverse reactions to anesthesia].    PAST SURGICAL HISTORY:    Past Surgical History:   Procedure Laterality Date     ABSCESS DRAINAGE Left     left leg      AS REMV KIDNEY,RADICAL Right      BIOPSY  7/2017 and 2/2018    ovarian and kidney cancer biopsies. also 1987 breast benign     BREAST CYST EXCISION  1985     BREAST SURGERY  1987    date unsure. benign breast cyst removed     CATARACT IOL, RT/LT Left 05/18/2018     CATARACT IOL, RT/LT Right 07/1318     COLONOSCOPY  2008 and 2013    polyps removed. Next due fall 2018     ENDOMETRIAL ABLATION  2008     EXCISE MASS BACK N/A 12/6/2021    Procedure: EXCISION, MASS, BACK;  Surgeon: Norris Vela MD;  Location: [facility] OR     HYSTERECTOMY TOTAL ABDOMINAL, BILATERAL SALPINGO-OOPHORECTOMY, NODE DISSECTION, COMBINED Left 7/7/2017    Procedure: COMBINED HYSTERECTOMY TOTAL ABDOMINAL, SALPINGO-OOPHORECTOMY, NODE DISSECTION;  Exploratory Laparotomy, Left Salpingo-Oophorectomy, Cancer Staging, Total Hysterectomy, Omentectomy, Evacuation of abdominal fluid, Lymph Node Dissection  Anesthesia Block ;  Surgeon: Serena Cole MD;  Location: [facility] OR     HYSTERECTOMY, PAP NO LONGER INDICATED  07/07/2017    Laparotomy; for ovarian cancer staging     HYSTERECTOMY, PAP NO LONGER INDICATED       INSERT PORT VASCULAR  ACCESS Right 8/21/2017    Procedure: INSERT PORT VASCULAR ACCESS;  Single Lumen Chest Power Port;  Surgeon: Stephen Mike PA-C;  [facility]: [facility]     IR PORT REMOVAL RIGHT  11/12/2021     LYMPHADENECTOMY RETROPERITONEAL Bilateral 07/07/2017    Laparotomy; pelvics & para-aortics; for ovarian cancer staging     OMENTECTOMY  07/07/2017    Laparotomy; for ovarian cancer staging     ORTHOPEDIC SURGERY  1/2002    broken left jaw due to fall, 4 fractures, titanium plates     OTHER SURGICAL HISTORY  01/2002    OTHER SURGICAL HISTORYfacial surgery due to fall      PHACOEMULSIFICATION CLEAR CORNEA WITH STANDARD INTRAOCULAR LENS IMPLANT Right 7/13/2018    Procedure: PHACOEMULSIFICATION CLEAR CORNEA WITH STANDARD INTRAOCULAR LENS IMPLANT;  RIght Eye Phacoemulsification Clear Cornea with Standard Intraocular Lens Placement ;  Surgeon: Mary Jo Massey MD;  [facility]     PHACOEMULSIFICATION CLEAR CORNEA WITH TORIC INTRAOCULAR LENS IMPLANT Left 5/18/2018    Procedure: PHACOEMULSIFICATION CLEAR CORNEA WITH TORIC INTRAOCULAR LENS IMPLANT;  Left Eye Phacoemulsification with Toric Lens;  Surgeon: Mary Jo Massey MD;  [facility]     REMOVE PORT VASCULAR ACCESS Right 11/12/2021    Procedure: REMOVAL, VASCULAR ACCESS PORT right;  Surgeon: Afsaneh Das PA-C;  Location: [facility] OR     SALPINGO OOPHORECTOMY,R/L/KAYY Right 2008    Salpingo Oophorectomy, RT     SALPINGO OOPHORECTOMY,R/L/KAYY Left 07/07/2017    Laparotomy; for ovarian cancer staging     SALPINGOOPHORECTOMY  2008     SURGICAL HISTORY OF -       facial surgery d/t fall in 1/2002     SURGICAL HISTORY OF -       1985 removal of breast cyst     SURGICAL HISTORY OF -   2008    endometrial ablation     YAG CAPSULOTOMY OD (RIGHT EYE)  10/22/2018       MEDICATIONS:   Prior to Admission medications    Medication Sig Last Dose Taking? Auth Provider Long Term End Date   Acetaminophen (TYLENOL PO) Take 500 mg by mouth 2 tabs prn pain (monthly)   Reported, Patient      ASPIRIN NOT PRESCRIBED (INTENTIONAL) Please choose reason not prescribed from choices below.   Morelia Ayon NP Yes    atorvastatin (LIPITOR) 80 MG tablet TAKE 1 TABLET(80 MG) BY MOUTH DAILY   Morelia Ayon NP Yes    B Complex Vitamins (VITAMIN B-COMPLEX PO) Take 50 mg by mouth    Reported, Patient     CALCIUM-MAGNESIUM-VITAMIN D PO Take 2 tablets by mouth daily   Reported, Patient     cephALEXin (KEFLEX) 500 MG capsule Take 1 capsule (500 mg) by mouth 4 times daily   Bc Andres PA-C     Cholecalciferol (VITAMIN D) 1000 UNIT capsule Take 2,000 Units by mouth daily    Reported, Patient     DiphenhydrAMINE HCl (BENADRYL PO) Take 50 mg by mouth daily as needed (monthly)   Reported, Patient     doxycycline monohydrate (ADOXA) 100 MG tablet Take 1 tablet (100 mg) by mouth 2 times daily for 10 days   Huma Candelaria CNP  8/27/22   EPINEPHrine (ANY BX GENERIC EQUIV) 0.3 MG/0.3ML injection 2-pack Inject 0.3 mLs (0.3 mg) into the muscle once as needed for anaphylaxis   Morelia Ayon NP     escitalopram (LEXAPRO) 20 MG tablet Take 1 tablet (20 mg) by mouth daily   Morelia Ayon NP Yes    fluticasone (FLONASE) 50 MCG/ACT nasal spray SHAKE LIQUID AND USE 2 SPRAYS IN EACH NOSTRIL DAILY   Morelia Ayon NP     LORazepam (ATIVAN) 1 MG tablet Take 1 tablet (1 mg) by mouth every 6 hours as needed (nausea/vomiting, anxiety or sleep)   Morelia Ayon NP     Magnesium Hydroxide (MAGNESIA PO) Take 500 mg by mouth   Reported, Patient     metFORMIN (GLUCOPHAGE XR) 500 MG 24 hr tablet TAKE 2 TABLETS BY MOUTH EVERY DAY WITH THE EVENING MEAL   Morelia Ayon NP Yes    Multiple Vitamins-Minerals (MULTIVITAMIN ADULT PO) Take 1 tablet by mouth daily   Unknown, Entered By History     nitroFURantoin macrocrystal-monohydrate (MACROBID) 100 MG capsule TAKE 1 CAPSULE BY MOUTH AFTER INTERCOURSE AS NEEDED   Morelia Ayon NP     nystatin (MYCOSTATIN) 718388 UNIT/GM external powder Apply topically 2  times daily as needed Apply to folds under breast and abdomen.   Iris Dalton APRN CNP     Probiotic Product (PRO-BIOTIC BLEND PO) Take 1 capsule by mouth daily Enzymatic Therapy-probiotic pearls   Reported, Patient     rivaroxaban ANTICOAGULANT (XARELTO) 20 MG TABS tablet Take 1 tablet (20 mg) by mouth daily (with dinner)   William Villarreal MD Yes    tamoxifen (NOLVADEX) 10 MG tablet Cut each pill in 1/2 with pill cutter to ensure 5 mg daily dose.   Stephie Karimi APRN CNS Yes    traZODone (DESYREL) 50 MG tablet TAKE 1 TABLET  BY MOUTH EVERY NIGHT AS NEEDED FOR SLEEP   Morelia Ayon NP Yes        ALLERGIES:   Paclitaxel, Wasps [hornets], Yellow hornet venom [hornet venom], Yellow jacket venom [venomil honey bee], and Sulfa drugs    SOCIAL HISTORY:   Social History     Socioeconomic History     Marital status: Single     Spouse name: Not on file     Number of children: 0     Years of education: Not on file     Highest education level: Not on file   Occupational History     Comment: [employer]   Tobacco Use     Smoking status: Never Smoker     Smokeless tobacco: Never Used   Substance and Sexual Activity     Alcohol use: Yes     Comment: Very infrequent, a bit of wine or beer 2x per month maybe     Drug use: Yes     Types: Marijuana     Comment: infrequent, for celebrations only, maybe 8x per year     Sexual activity: Yes     Partners: Male     Birth control/protection: None     Comment: long-time    Other Topics Concern     Parent/sibling w/ CABG, MI or angioplasty before 65F 55M? No   Social History Narrative     Not on file     Social Determinants of Health     Financial Resource Strain: Not on file   Food Insecurity: Not on file   Transportation Needs: Not on file   Physical Activity: Not on file   Stress: Not on file   Social Connections: Not on file   Intimate Partner Violence: Not At Risk     Fear of Current or Ex-Partner: No     Emotionally Abused: No      Physically Abused: No     Sexually Abused: No   Housing Stability: Not on file     Living situation: Patient lives in a house  Occupation: retired  Tobacco: denies  Alcohol: denies  Illicit Drugs: denies    FAMILY HISTORY:  Family History   Problem Relation Age of Onset     C.A.D. Mother      Hypertension Mother      Breast Cancer Mother      Psychotic Disorder Mother      Colon Cancer Mother      Cancer Mother         Bone cancer     Coronary Artery Disease Mother      Hyperlipidemia Mother         treated w. statins     Other Cancer Mother         bone/marrow, ,  of this     Depression Mother      Anxiety Disorder Mother      Mental Illness Mother         various undiagnosed types, but THERE     Obesity Mother      Bone Cancer Mother      Other - See Comments Mother         psychotic disease      C.A.D. Father      Diabetes Father      Hypertension Father      Cerebrovascular Disease Father         1984 or      Psychotic Disorder Father      Pancreatic Cancer Father      Coronary Artery Disease Father      Hyperlipidemia Father         treated w statins     Other Cancer Father         pancreatic, ,  of this     Depression Father      Mental Illness Father         likely PTSD and depression     Obesity Father      Other - See Comments Father         cerebrovascular disease, psychotic disease      Psychotic Disorder Sister         x2     Neurologic Disorder Sister      Hypertension Sister      Hyperlipidemia Sister         treated w statins     Depression Sister      Anxiety Disorder Sister      Obesity Sister      Rashes/Skin Problems Sister         precancerous lesion on leg     Psychotic Disorder Sister      Other - See Comments Sister         small kidney stone      Hypertension Sister      Hyperlipidemia Sister         treated w statins     Depression Sister      Anxiety Disorder Sister      Other - See Comments Sister         psychotic disease      Prostate Problems Brother          cleared      Hypertension Brother      Hyperlipidemia Brother         unsure if treated w statins     Depression Brother      Anxiety Disorder Brother      Mental Illness Brother         undiagnosed but THERE     Obesity Brother      Psychotic Disorder Brother         x2     Depression Brother         major depressive disorder and OCD     Anxiety Disorder Brother      Mental Illness Brother         not sure of diagnoses, treated     Hypertension Brother      Hyperlipidemia Brother      Prostate Cancer Brother      Depression Brother      Anxiety Disorder Brother      Other - See Comments Brother         psychotic disease      Psychotic Disorder Maternal Grandmother         ?     Coronary Artery Disease Maternal Grandmother          of heart attack age 84?     Depression Maternal Grandmother      Psychotic Disorder Maternal Grandfather         ?     Psychotic Disorder Paternal Grandmother         Schizophernia     Coronary Artery Disease Paternal Grandmother          of heart attack, age 85?     Depression Paternal Grandmother         severe, but recovered     Mental Illness Paternal Grandmother         possible bipolar or other     Schizophrenia Paternal Grandmother      Psychotic Disorder Paternal Grandfather         ?     Respiratory Paternal Grandfather          of emphysema and smoking     Emphysema Paternal Grandfather      Cancer - colorectal No family hx of      Glaucoma No family hx of      Macular Degeneration No family hx of        Patient denies known family history of bleeding, clotting, or anesthesia related complications.     REVIEW OF SYSTEMS:   Positive for left leg pain. Otherwise a 10-point reviews of systems was negative except as noted above in the HPI.     PHYSICAL EXAM:   Vitals:    22 1716 22 2145   BP: 116/79    Pulse: 104    Temp: 98.2  F (36.8  C) 98.3  F (36.8  C)   TempSrc: Oral Oral   SpO2: 96% 96%     General: Awake, alert, appropriate, following commands,  NAD.  Lungs: Breathing comfortably and nonlabored, no wheezes or stridor noted.  Heart/Cardiovascular: Regular pulse, no peripheral cyanosis.    Right Lower Extremity: No deformity, skin intact. No significant tenderness to palpation over thigh, knee, leg, ankle/foot. Motor intact distally TA/GSC/EHL/FHL with symmetric to contralateral side strength. SILT sp/dp/tibial/saph/sural nerves. DP/PT pulses palpable, 2+, toes warm and well perfused.     Left Lower Extremity: Marker border without extension of erythema, firm hematoma underneath skin at proximal aspect of tibia with small area of fluctuance distally, paresthesia located over the tibial tubercle, no paresthesia located over remainder of skin, no hypermobility of skin.  Tender to palpation over anterior lateral proximal tibia.  Motor intact distally TA/GSC/EHL/FHL with strength symmetric to contralateral. SILT sp/dp/tibial/saph/sural nerves. DP/PT pulses palpable, 2+, toes warm and well perfused.     LABS:  Hemoglobin   Date Value Ref Range Status   08/18/2022 10.4 (L) 11.7 - 15.7 g/dL Final   11/12/2021 11.5 (L) 11.7 - 15.7 g/dL Final   01/14/2020 11.1 (L) 11.7 - 15.7 g/dL Final   01/03/2020 11.8 11.7 - 15.7 g/dL Final     WBC   Date Value Ref Range Status   01/14/2020 6.7 4.0 - 11.0 10e9/L Final     WBC Count   Date Value Ref Range Status   08/18/2022 6.0 4.0 - 11.0 10e3/uL Final     Platelet Count   Date Value Ref Range Status   08/18/2022 366 150 - 450 10e3/uL Final   01/14/2020 249 150 - 450 10e9/L Final     INR   Date Value Ref Range Status   08/18/2022 1.49 (H) 0.85 - 1.15 Final   08/21/2017 1.05 0.86 - 1.14 Final     Creatinine   Date Value Ref Range Status   08/18/2022 1.10 (H) 0.51 - 0.95 mg/dL Final   06/28/2021 1.20 (H) 0.52 - 1.04 mg/dL Final     Glucose   Date Value Ref Range Status   08/18/2022 151 (H) 70 - 99 mg/dL Final   07/06/2022 127 (H) 70 - 99 mg/dL Final   06/28/2021 96 70 - 99 mg/dL Final     CRP Inflammation   Date Value Ref Range  Status   2022 <3.00 <5.00 mg/L Final   2017 49.0 (H) 0.0 - 8.0 mg/L Final     Sed Rate   Date Value Ref Range Status   2017 25 0 - 30 mm/h Final     Erythrocyte Sedimentation Rate   Date Value Ref Range Status   2022 29 0 - 30 mm/hr Final       IMAGING:  XR L Tib/fib 22                                                              IMPRESSION: Tibia and fibula negative. No evidence of osteomyelitis.    US LLE 22  IMPRESSION:  1.  No evidence left lower extremity deep venous thrombosis.  2.  Large mildly complex hypoechoic collection in the left lateral calf interposed between the subcutaneous fat and muscle layer. The appearance of the lesion and its well-defined location between the fat and muscle is suspicious for a Frank Joanne lesion. Recommend clarification of patient's knee injury as this type of injury is typically caused by a shearing mechanism. Differential also includes hematoma and less likely abscess in the appropriate clinical setting. Nonurgent outpatient MRI could be considered for further evaluation.    MRI L Tib/fib 22                                                          IMPRESSION:  Nonenhancing, mixed intensity collection within the deep subcutaneous tissues of the anterolateral aspect of the proximal left leg and knee, favoring hematoma. Given its location at the junction of the superficial fascia of the anterior compartment musculature and the deep subcutaneous tissues, an internal degloving phenomenon cannot be excluded (Frank-Zarina lesion). Superinfection cannot be excluded, as well.    IMPRESSION:   Hafsa Corona is a 67 year old female w/ PMHx as listed in HPI with the followin. Left proximal leg hematoma    History and physical exam are consistent with hematoma rather than Frank-Zarina lesion given the absence of fluctuance, hypermobile skin, paresthesias overlying the skin, and shearing mechanism.  Additionally this patient denies any  constitutional symptoms suggestive of infection thus this patient would be appropriate for outpatient follow-up with her primary care provider in 2 weeks.  Recommendations for treatment include rest, ice, compression, and elevation of the left lower extremity.  If in 2 weeks patient continues to have pain, swelling, and mass that has not improved could consider referral to radiology/interventional radiology for percutaneous drain placement.    RECOMMENDATIONS:   - RICE: Rest, ice, compress, elevate  - Anticoagulation/DVT Prophylaxis: Continue PTA treatment  - Antibiotics/Tetanus: None indicated from ortho perspective for hematoma  - X-rays/Imaging: Complete  - Activity: as tolerated  - Weight bearing: as tolerated  - Pain control: per ED  - Diet: ok for regular from ortho perspective  - Follow-up: PCP in 2 weeks  - Disposition: Ok to discharge from ED from orthopaedic perspective    Assessment and Plan discussed with senior resident Dr. Mtz.    Cam Bro MD  Orthopaedic Surgery Resident  [facility]

## 2022-08-19 NOTE — ED NOTES
Emergency Department Patient Sign-out       Brief HPI:  This is a 67 year old female signed out to me by Dr. Carvalho .  See initial ED Provider note for details of the presentation.            Significant Events prior to my assuming care: The pt is a 67 yof who presents after leg injury with concerns for cellulitis. US and MRI show findings c/w  Frank Garrick. Pending ortho consult.       Exam:   Patient Vitals for the past 24 hrs:   BP Temp Temp src Pulse SpO2   08/18/22 2145 -- 98.3  F (36.8  C) Oral -- 96 %   08/18/22 1716 116/79 98.2  F (36.8  C) Oral 104 96 %           ED RESULTS:   Results for orders placed or performed during the hospital encounter of 08/18/22 (from the past 24 hour(s))   CBC with platelets differential     Status: Abnormal    Collection Time: 08/18/22  5:36 PM    Narrative    The following orders were created for panel order CBC with platelets differential.  Procedure                               Abnormality         Status                     ---------                               -----------         ------                     CBC with platelets and d...[[id number]]  Abnormal            Final result                 Please view results for these tests on the individual orders.   Basic metabolic panel     Status: Abnormal    Collection Time: 08/18/22  5:36 PM   Result Value Ref Range    Creatinine 1.10 (H) 0.51 - 0.95 mg/dL    Sodium 142 136 - 145 mmol/L    Potassium 3.7 3.4 - 5.3 mmol/L    Urea Nitrogen 19.7 8.0 - 23.0 mg/dL    Chloride 107 98 - 107 mmol/L    Carbon Dioxide (CO2) 22 22 - 29 mmol/L    Anion Gap 13 7 - 15 mmol/L    Glucose 151 (H) 70 - 99 mg/dL    GFR Estimate 54 (L) >60 mL/min/1.73m2    Calcium 9.1 8.8 - 10.2 mg/dL   CBC with platelets and differential     Status: Abnormal    Collection Time: 08/18/22  5:36 PM   Result Value Ref Range    WBC Count 6.0 4.0 - 11.0 10e3/uL    RBC Count 3.71 (L) 3.80 - 5.20 10e6/uL    Hemoglobin 10.4 (L) 11.7 - 15.7 g/dL    Hematocrit 32.9 (L)  "35.0 - 47.0 %    MCV 89 78 - 100 fL    MCH 28.0 26.5 - 33.0 pg    MCHC 31.6 31.5 - 36.5 g/dL    RDW 14.0 10.0 - 15.0 %    Platelet Count 366 150 - 450 10e3/uL    % Neutrophils 53 %    % Lymphocytes 35 %    % Monocytes 9 %    % Eosinophils 2 %    % Basophils 1 %    % Immature Granulocytes 0 %    NRBCs per 100 WBC 0 <1 /100    Absolute Neutrophils 3.2 1.6 - 8.3 10e3/uL    Absolute Lymphocytes 2.1 0.8 - 5.3 10e3/uL    Absolute Monocytes 0.5 0.0 - 1.3 10e3/uL    Absolute Eosinophils 0.1 0.0 - 0.7 10e3/uL    Absolute Basophils 0.0 0.0 - 0.2 10e3/uL    Absolute Immature Granulocytes 0.0 <=0.4 10e3/uL    Absolute NRBCs 0.0 10e3/uL   Erythrocyte sedimentation rate auto     Status: Normal    Collection Time: 08/18/22  5:36 PM   Result Value Ref Range    Erythrocyte Sedimentation Rate 29 0 - 30 mm/hr   CRP inflammation     Status: Normal    Collection Time: 08/18/22  5:36 PM   Result Value Ref Range    CRP Inflammation <3.00 <5.00 mg/L   Procalcitonin     Status: Normal    Collection Time: 08/18/22  5:36 PM   Result Value Ref Range    Procalcitonin 0.02 <0.05 ng/mL   XR Tibia and Fibula Left 2 Views     Status: None    Collection Time: 08/18/22  6:06 PM    Narrative    EXAM: XR TIBIA AND FIBULA LEFT 2 VIEWS  LOCATION: [facility]  DATE/TIME: 8/18/2022 6:06 PM    INDICATION: cellulitis, incre swelling, eval for signs of osteomyelitis  COMPARISON: None.      Impression    IMPRESSION: Tibia and fibula negative. No evidence of osteomyelitis.   US Lower Extremity Venous Duplex Left     Status: None    Collection Time: 08/18/22  7:42 PM    Narrative    EXAMINATION: DOPPLER VENOUS ULTRASOUND OF THE LEFT LOWER EXTREMITY,  8/18/2022 7:42 PM     COMPARISON: None.    HISTORY: Swelling. Evaluate for DVT.    Additional history from EMR: \"Patient had recent knee injury to left  leg x3 weeks ago. Had significant bruising d/t being on Xarelto. Dx  with cellulitis to area x1.5 weeks ago put on oral " "antibiotics.\"    TECHNIQUE:  Gray-scale evaluation with compression, spectral flow, and  color Doppler assessment of the deep venous system of the left leg  from groin to knee, and then at the ankle.    FINDINGS:  In the left lower extremity, the common femoral, femoral, popliteal  and posterior tibial veins demonstrate normal compressibility and  blood flow.    In the left lateral calf there is an 11.2 x 1.8 cm mildly complex  appearing predominantly hypoechoic to anechoic ovoid collection  interposed between the subcutaneous fat and anterior musculature. No  internal flow or surrounding hyperemia identified.      Impression    IMPRESSION:  1.  No evidence left lower extremity deep venous thrombosis.  2.  Large mildly complex hypoechoic collection in the left lateral  calf interposed between the subcutaneous fat and muscle layer. The  appearance of the lesion and its well-defined location between the fat  and muscle is suspicious for a Frank Joanne lesion. Recommend  clarification of patient's knee injury as this type of injury is  typically caused by a shearing mechanism. Differential also includes  hematoma and less likely abscess in the appropriate clinical setting.  Nonurgent outpatient MRI could be considered for further evaluation.    I have personally reviewed the examination and initial interpretation  and I agree with the findings.    TUCKER JANE MD         SYSTEM ID:  [id number]   MR Tibia Fibula Lower Leg Left wo & w Contr     Status: None    Collection Time: 08/18/22 10:53 PM    Narrative    EXAM: MR TIBIA FIBULA LOWER LEG LEFT W/O and W CONTR  LOCATION: [facility]  DATE/TIME: 8/18/2022 10:53 PM    INDICATION: Redness and swelling of the lateral left leg, status post fall 2 weeks ago.  COMPARISON: Radiographs 08/18/2022.  TECHNIQUE: Routine. Additional postgadolinium T1 sequences were obtained.  IV CONTRAST: 10mL Gadavist    FINDINGS:     There is a T2 " hyperintense, mixed T1 signal intensity lesion within the subcutaneous tissues of the anterolateral proximal left leg and knee, which demonstrates no internal enhancement. This abuts the superficial fascia of the anterior compartment   musculature within the left leg and demonstrates mild mass effect upon it. This collection measures 2 x 8 x 12 cm.    No evidence of osteomyelitis.     Evidence of fracture, stress fracture, or avascular necrosis. No suspicious marrow replacement. Predominantly mild polyarticular chondrosis. There is a trace left knee joint effusion.    There is focal fatty infiltration within the midportion of the medial head gastrocnemius muscle belly, of no clinical significance. There is diffuse soft tissue swelling throughout the subcutaneous tissues of the visualized left lower extremity, favoring   a bland edema pattern. Multiple superficial venous varicosities are present.      Impression    IMPRESSION:    Nonenhancing, mixed intensity collection within the deep subcutaneous tissues of the anterolateral aspect of the proximal left leg and knee, favoring hematoma. Given its location at the junction of the superficial fascia of the anterior compartment   musculature and the deep subcutaneous tissues, an internal degloving phenomenon cannot be excluded (Frank-Zarina lesion). Superinfection cannot be excluded, as well.       ED MEDICATIONS:   Medications   oxyCODONE (ROXICODONE) tablet 5 mg (has no administration in time range)   gadobutrol (GADAVIST) injection 10 mL (10 mLs Intravenous Given 8/18/22 3444)         Impression:    ICD-10-CM    1. Frank Joanne lesion  T14.8XXA    2. Hematoma of skin  T14.8XXA        Plan:    Pending ortho consult.    89 pt d/w ortho, they has seen the patient in the emergency department and believe exam is most consistent with hematoma rather than Frank Joanne lesion.  They recommend patient wear compression wrap and avoid strenuous activity and follow-up with  her PCP in 2 weeks.  If not improved within 2 weeks, patient may need follow-up with Ortho/interventional radiology for drain placement.    Patient to be provided with Ace wrap in the emergency department    Patient to be discharged home. Advised to follow up with PCP in 2 weeks. To return to ER immediately with any new/worsening symptoms. Plan of care discussed with patient who expresses understanding and agrees with plan of care    MD Marisol George Michelle C, MD  08/19/22 0115

## 2022-08-19 NOTE — DISCHARGE INSTRUCTIONS
Addended by: Jyotsna Hopper on: 10/23/2019 03:14 PM     Modules accepted: Orders TODAY'S VISIT:  You were seen today for swelling   -   - If you had any labs or imaging/radiology tests performed today, you should also discuss these tests with your usual provider.     FOLLOW-UP:  Please make an appointment to follow up with:  - Your Primary Care Provider in 2 weeks. If you do not have a PCP, please call the [facility] Care Center (phone: [phone] for an appointment  - If symptoms not improved in 2 weeks, follow up with Orthopedics Clinic (phone: [phone]). You may need an interventional radiology consult for drain placement if not improving     - Have your provider review the results from today's visit with you again to make sure no further follow-up or additional testing is needed based on those results.     OTHER INSTRUCTIONS:  - use compression wrap to affected area  - avoid strenuous activity    RETURN TO THE EMERGENCY DEPARTMENT  Return to the Emergency Department at any time for any new or worsening symptoms or any concerns.

## 2022-09-14 ENCOUNTER — OFFICE VISIT (OUTPATIENT)
Dept: FAMILY MEDICINE | Facility: CLINIC | Age: 67
End: 2022-09-14
Payer: COMMERCIAL

## 2022-09-14 VITALS
DIASTOLIC BLOOD PRESSURE: 70 MMHG | BODY MASS INDEX: 36.49 KG/M2 | WEIGHT: 206 LBS | HEART RATE: 88 BPM | RESPIRATION RATE: 16 BRPM | TEMPERATURE: 97.3 F | SYSTOLIC BLOOD PRESSURE: 110 MMHG | OXYGEN SATURATION: 97 %

## 2022-09-14 DIAGNOSIS — E66.01 MORBID OBESITY (H): ICD-10-CM

## 2022-09-14 DIAGNOSIS — L03.116 LEFT LEG CELLULITIS: Primary | ICD-10-CM

## 2022-09-14 DIAGNOSIS — E11.9 TYPE 2 DIABETES MELLITUS WITHOUT COMPLICATION, UNSPECIFIED WHETHER LONG TERM INSULIN USE (H): ICD-10-CM

## 2022-09-14 DIAGNOSIS — M79.89 LEFT LEG SWELLING: ICD-10-CM

## 2022-09-14 PROCEDURE — 99213 OFFICE O/P EST LOW 20 MIN: CPT | Mod: 25 | Performed by: FAMILY MEDICINE

## 2022-09-14 PROCEDURE — 90471 IMMUNIZATION ADMIN: CPT | Performed by: FAMILY MEDICINE

## 2022-09-14 PROCEDURE — 90662 IIV NO PRSV INCREASED AG IM: CPT | Performed by: FAMILY MEDICINE

## 2022-09-14 ASSESSMENT — ANXIETY QUESTIONNAIRES
6. BECOMING EASILY ANNOYED OR IRRITABLE: NOT AT ALL
GAD7 TOTAL SCORE: 1
IF YOU CHECKED OFF ANY PROBLEMS ON THIS QUESTIONNAIRE, HOW DIFFICULT HAVE THESE PROBLEMS MADE IT FOR YOU TO DO YOUR WORK, TAKE CARE OF THINGS AT HOME, OR GET ALONG WITH OTHER PEOPLE: NOT DIFFICULT AT ALL
8. IF YOU CHECKED OFF ANY PROBLEMS, HOW DIFFICULT HAVE THESE MADE IT FOR YOU TO DO YOUR WORK, TAKE CARE OF THINGS AT HOME, OR GET ALONG WITH OTHER PEOPLE?: NOT DIFFICULT AT ALL
4. TROUBLE RELAXING: NOT AT ALL
1. FEELING NERVOUS, ANXIOUS, OR ON EDGE: SEVERAL DAYS
5. BEING SO RESTLESS THAT IT IS HARD TO SIT STILL: NOT AT ALL
2. NOT BEING ABLE TO STOP OR CONTROL WORRYING: NOT AT ALL
3. WORRYING TOO MUCH ABOUT DIFFERENT THINGS: NOT AT ALL
GAD7 TOTAL SCORE: 1
7. FEELING AFRAID AS IF SOMETHING AWFUL MIGHT HAPPEN: NOT AT ALL
GAD7 TOTAL SCORE: 1
7. FEELING AFRAID AS IF SOMETHING AWFUL MIGHT HAPPEN: NOT AT ALL

## 2022-09-14 ASSESSMENT — PATIENT HEALTH QUESTIONNAIRE - PHQ9
SUM OF ALL RESPONSES TO PHQ QUESTIONS 1-9: 2
SUM OF ALL RESPONSES TO PHQ QUESTIONS 1-9: 2
10. IF YOU CHECKED OFF ANY PROBLEMS, HOW DIFFICULT HAVE THESE PROBLEMS MADE IT FOR YOU TO DO YOUR WORK, TAKE CARE OF THINGS AT HOME, OR GET ALONG WITH OTHER PEOPLE: NOT DIFFICULT AT ALL

## 2022-09-14 ASSESSMENT — PAIN SCALES - GENERAL: PAINLEVEL: MILD PAIN (2)

## 2022-09-14 NOTE — PROGRESS NOTES
Assessment & Plan     Left leg cellulitis  Reviewed urgent care and ER notes. Recovering well. I do not see any further need for antibiotics.     Left leg swelling  Compression socks ordered by pcp. Printed again and discussed how to obtain.     Type 2 diabetes mellitus without complication, unspecified whether long term insulin use (H)  Risk factor. a1c under good control.     BMI > 35 with comorbidities  Risk factor for above. Discuss further care with pcp.                    No follow-ups on file.    Javon Winter MD, MD  [facility]    Aiyana Pereyra is a 67 year old, presenting for the following health issues:  Musculoskeletal Problem      Musculoskeletal Problem    History of Present Illness       Reason for visit:  Follow up for left leg injury per e-room instructions   Symptom onset:  3-4 weeks ago  Symptoms include:  Slight redness,swelling and ache  Symptom intensity:  Moderate  Symptom progression:  Improving    She eats 2-3 servings of fruits and vegetables daily.She consumes 1 sweetened beverage(s) daily.She exercises with enough effort to increase her heart rate 10 to 19 minutes per day.  She exercises with enough effort to increase her heart rate 4 days per week.   She is taking medications regularly.    Today's PHQ-9         PHQ-9 Total Score: 2    PHQ-9 Q9 Thoughts of better off dead/self-harm past 2 weeks :   Not at all    How difficult have these problems made it for you to do your work, take care of things at home, or get along with other people: Not difficult at all  Today's BLAIR-7 Score: 1     [place] [facility] [place].     Review of Systems         Objective    /70 (BP Location: Right arm, Patient Position: Sitting, Cuff Size: Adult Large)   Pulse 88   Temp 97.3  F (36.3  C) (Temporal)   Resp 16   Wt 93.4 kg (206 lb)   LMP 01/01/2009   SpO2 97%   BMI 36.49 kg/m    Body mass index is 36.49 kg/m .  Physical  Exam       left leg varicose veins and spinder veins present, some scaling of skin, diffuse mild swelling around ankle. Reported area where skin was affected most - closer to knee area shows minimal hyperpigmentation of skin but no ecchymosis, break down of skin or raised temp of skin.

## 2022-09-19 DIAGNOSIS — F33.0 MAJOR DEPRESSIVE DISORDER, RECURRENT EPISODE, MILD (H): ICD-10-CM

## 2022-09-22 RX ORDER — ESCITALOPRAM OXALATE 20 MG/1
20 TABLET ORAL DAILY
Qty: 90 TABLET | Refills: 1 | Status: SHIPPED | OUTPATIENT
Start: 2022-09-22 | End: 2022-12-23

## 2022-09-22 NOTE — TELEPHONE ENCOUNTER
Prescription approved per [employer] Refill Protocol.  CHARLENE Flowers RN  [facility]

## 2022-09-28 DIAGNOSIS — Z91.89 AT HIGH RISK FOR BREAST CANCER: ICD-10-CM

## 2022-09-28 NOTE — TELEPHONE ENCOUNTER
[facility]: Hematology                                                                                              Last date written and prescribing provider:  10/2021 Stephie Karimi     Last office visit: 10/8/2021  Next office visit:  10/21/2022      Miroslava Alejandro LPN  Hematology Care Coordination  [phone]

## 2022-09-29 RX ORDER — TAMOXIFEN CITRATE 10 MG/1
TABLET ORAL
Qty: 45 TABLET | Refills: 3 | Status: SHIPPED | OUTPATIENT
Start: 2022-09-29 | End: 2023-06-06

## 2022-10-20 ENCOUNTER — PRE VISIT (OUTPATIENT)
Dept: UROLOGY | Facility: CLINIC | Age: 67
End: 2022-10-20

## 2022-10-20 DIAGNOSIS — C56.2 OVARIAN CANCER, LEFT (H): Primary | ICD-10-CM

## 2022-10-20 NOTE — TELEPHONE ENCOUNTER
Reason for visit: 1 year follow up      Dx/Hx/Sx: mixed incontinence, microscopic hematuria, angiomyolipoma of kidney     Records/imaging/labs/orders: renal US scheduled 11/14    At Rooming: standard

## 2022-10-21 ENCOUNTER — ANCILLARY PROCEDURE (OUTPATIENT)
Dept: MAMMOGRAPHY | Facility: CLINIC | Age: 67
End: 2022-10-21
Payer: COMMERCIAL

## 2022-10-21 ENCOUNTER — ONCOLOGY VISIT (OUTPATIENT)
Dept: ONCOLOGY | Facility: CLINIC | Age: 67
End: 2022-10-21
Attending: CLINICAL NURSE SPECIALIST
Payer: COMMERCIAL

## 2022-10-21 ENCOUNTER — LAB (OUTPATIENT)
Dept: LAB | Facility: CLINIC | Age: 67
End: 2022-10-21
Payer: COMMERCIAL

## 2022-10-21 VITALS
DIASTOLIC BLOOD PRESSURE: 74 MMHG | TEMPERATURE: 98.3 F | HEART RATE: 91 BPM | OXYGEN SATURATION: 95 % | BODY MASS INDEX: 37.41 KG/M2 | SYSTOLIC BLOOD PRESSURE: 130 MMHG | WEIGHT: 211.2 LBS

## 2022-10-21 DIAGNOSIS — Z80.3 FAMILY HISTORY OF MALIGNANT NEOPLASM OF BREAST: ICD-10-CM

## 2022-10-21 DIAGNOSIS — R92.30 DENSE BREAST: ICD-10-CM

## 2022-10-21 DIAGNOSIS — C56.2 OVARIAN CANCER, LEFT (H): ICD-10-CM

## 2022-10-21 DIAGNOSIS — Z12.39 BREAST CANCER SCREENING, HIGH RISK PATIENT: Primary | ICD-10-CM

## 2022-10-21 DIAGNOSIS — Z12.39 BREAST CANCER SCREENING, HIGH RISK PATIENT: ICD-10-CM

## 2022-10-21 LAB — CANCER AG125 SERPL-ACNC: 15 U/ML

## 2022-10-21 PROCEDURE — G0463 HOSPITAL OUTPT CLINIC VISIT: HCPCS

## 2022-10-21 PROCEDURE — 77063 BREAST TOMOSYNTHESIS BI: CPT | Mod: GC | Performed by: RADIOLOGY

## 2022-10-21 PROCEDURE — 86304 IMMUNOASSAY TUMOR CA 125: CPT

## 2022-10-21 PROCEDURE — 36415 COLL VENOUS BLD VENIPUNCTURE: CPT

## 2022-10-21 PROCEDURE — 99214 OFFICE O/P EST MOD 30 MIN: CPT | Performed by: CLINICAL NURSE SPECIALIST

## 2022-10-21 PROCEDURE — 77067 SCR MAMMO BI INCL CAD: CPT | Mod: GC | Performed by: RADIOLOGY

## 2022-10-21 ASSESSMENT — PAIN SCALES - GENERAL: PAINLEVEL: NO PAIN (0)

## 2022-10-21 NOTE — LETTER
10/21/2022         RE: Hafsa Corona  [address]  [place] [address]        Dear Colleague,    Thank you for referring your patient, Hafsa Corona, to the [facility]. Please see a copy of my visit note below.    Oncology Risk Management Consultation:  Date on this visit: 10/21/2022    Hafsa Corona  requires heightened screening and surveillance for her higher risk of breast cancer, secondary to a family history of breast cancer in her mother at age 63.    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 She is considered to at high risk for breast cancer and has a 30.3% lifetime risk for breast cancer by the SHERON model. She has a 10.8% 5 year risk by the SHERON model.      Primary Physician:  MARLON Reilly-CNP     History Of Present Illness:  Ms. Corona is a very pleasant  67 year old female who presents with a family history of breast cancer.      Genetic testin2017 - Negative for mutations in the AIP, ALK, APC, COSTA, BAP1, BARD1, BLM, BRCA1, BRCA2, BRIP1, BMPR1A, CDH1, CDK4, CDKN1B, CDKN2A, CHEK2, DICER1, EPCAM, FANCC, FH, FLCN, GALNT12, GREM1, HOXB13, MAX, MEN1, MET, MITF, MLH1, MRE11A, MSH2, MSH6, MUTYH, NBN, NF1, NF2, PALB2, PHOX2B, PMS2, POLD1, POLE, POT1, JURTL6F, PTCH1, PTEN, RAD50, RAD51C, RAD51D, RB1, RET, SDHA, SDHAF2, SDHB, SDHC, SDHD, SMAD4, SMARCA4, SMARCB1, SMARCE1, STK11, SUFU, FXUL533, TP53, TSC1, TSC2, VHL, and XRCC2 genes using a Cancer-Next Expanded panel through [employer].     Pertinent history:  - R salpingo oophorectomy for abnormal imaging (enlarged ovary), biopsy showed endometriosis  2017 - Stage 1C2 Clear cell carcinoma of the L ovary, s/p HELENE/LSO and chemotherapy.19 cm cystic mass  18: R nephrectomy with Dr. 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   Nulliparous.  Menarche at 10.  Menopause: age 54  Hx of OCPs x6 years.  No hx of  HRT.  Density: heterogeneously dense  No history of breast biopsy.  Hx of breast cyst in 1987, aspirated   No history of hyperplasia, atypia, or malignancy  On low dose tamoxifen since 10/10/2019 for prevention of breast cancer. Continue through November 2024     Pertinent screening history:  6/2011 - Screening mammogram, normal by report   2/1/2014 - Screening mammogram, normal by report  9/15/2017 - Screening mammogram, BiRads1.    4/5/2018 - Breast MRI, BiRads2.  9/21/2018: Screening tomosynthesis mammogram, BI-RADS 1.  4/9/2019: Breast MRI, BI-RADS 1.  9/27/2019- Screening tomosynthesis mammogram, BiRads1  10/1/2020- Screening mammogram, BiRads1  3/29/2021- Breast MRI, BiRads1  10/13/2021- Screening tomosynthesis mammogram, BiRads1  4/6/2022- Breast MRI, BiRads1     At this visit, she denies asymmetry, lumps, masses, thickening, pain, nipple discharge and skin changes    Past Medical/Surgical History:  Past Medical History:   Diagnosis Date     Anxiety state, unspecified     [id number]     Arthritis 2014    vague, gradual, not treated really, knees feel it     At high risk for breast cancer 09/27/2019    30.3% lifetime risk by SHERON model     Attention deficit disorder without mention of hyperactivity      Cancer (H) 7/2017 and 1/2018    ovarian and kidney cancers, both treated well     Chronic rhinitis      Depressive disorder lifetime    plus Anxiety plus ADD. Escitalipram, therapy, ADD meds maybe     Heart disease 1999    tachycardia and high cholesterol, managed     History of blood transfusion 07/2017    while in hospital for ovarian cancer     Hypertension 1999    tho BP was too LOW last week. past 12 years Generally OK     Panic disorder without agoraphobia      Pure hypercholesterolemia     Lipitor     Tachycardia, unspecified     9/1999-2/2004     Type II or unspecified type diabetes mellitus without mention of complication, not stated as uncontrolled     diagnosed 2004     Past Surgical History:    Procedure Laterality Date     ABSCESS DRAINAGE Left     left leg      AS REMV KIDNEY,RADICAL Right      BIOPSY  7/2017 and 2/2018    ovarian and kidney cancer biopsies. also 1987 breast benign     BREAST CYST EXCISION  1985     BREAST SURGERY  1987    date unsure. benign breast cyst removed     CATARACT IOL, RT/LT Left 05/18/2018     CATARACT IOL, RT/LT Right 07/1318     COLONOSCOPY  2008 and 2013    polyps removed. Next due fall 2018     ENDOMETRIAL ABLATION  2008     EXCISE MASS BACK N/A 12/6/2021    Procedure: EXCISION, MASS, BACK;  Surgeon: Norris Vela MD;  Location: [facility] OR     HYSTERECTOMY TOTAL ABDOMINAL, BILATERAL SALPINGO-OOPHORECTOMY, NODE DISSECTION, COMBINED Left 7/7/2017    Procedure: COMBINED HYSTERECTOMY TOTAL ABDOMINAL, SALPINGO-OOPHORECTOMY, NODE DISSECTION;  Exploratory Laparotomy, Left Salpingo-Oophorectomy, Cancer Staging, Total Hysterectomy, Omentectomy, Evacuation of abdominal fluid, Lymph Node Dissection  Anesthesia Block ;  Surgeon: Serena Cole MD;  Location: [facility]     HYSTERECTOMY, PAP NO LONGER INDICATED  07/07/2017    Laparotomy; for ovarian cancer staging     HYSTERECTOMY, PAP NO LONGER INDICATED       INSERT PORT VASCULAR ACCESS Right 8/21/2017    Procedure: INSERT PORT VASCULAR ACCESS;  Single Lumen Chest Power Port;  Surgeon: Stephen Mike PA-C;  [facility]     IR PORT REMOVAL RIGHT  11/12/2021     LYMPHADENECTOMY RETROPERITONEAL Bilateral 07/07/2017    Laparotomy; pelvics & para-aortics; for ovarian cancer staging     OMENTECTOMY  07/07/2017    Laparotomy; for ovarian cancer staging     ORTHOPEDIC SURGERY  1/2002    broken left jaw due to fall, 4 fractures, titanium plates     OTHER SURGICAL HISTORY  01/2002    OTHER SURGICAL HISTORYfacial surgery due to fall      PHACOEMULSIFICATION CLEAR CORNEA WITH STANDARD INTRAOCULAR LENS IMPLANT Right 7/13/2018    Procedure: PHACOEMULSIFICATION CLEAR CORNEA WITH STANDARD INTRAOCULAR LENS IMPLANT;  RIght Eye  Phacoemulsification Clear Cornea with Standard Intraocular Lens Placement ;  Surgeon: Mary Jo Massey MD;  [facility]     PHACOEMULSIFICATION CLEAR CORNEA WITH TORIC INTRAOCULAR LENS IMPLANT Left 5/18/2018    Procedure: PHACOEMULSIFICATION CLEAR CORNEA WITH TORIC INTRAOCULAR LENS IMPLANT;  Left Eye Phacoemulsification with Toric Lens;  Surgeon: Mary Jo Massey MD;  [facility]     REMOVE PORT VASCULAR ACCESS Right 11/12/2021    Procedure: REMOVAL, VASCULAR ACCESS PORT right;  Surgeon: Afsaneh Das PA-C;  Location: [facility] OR     SALPINGO OOPHORECTOMY,R/L/KAYY Right 2008    Salpingo Oophorectomy, RT     SALPINGO OOPHORECTOMY,R/L/KAYY Left 07/07/2017    Laparotomy; for ovarian cancer staging     SALPINGOOPHORECTOMY  2008     SURGICAL HISTORY OF -       facial surgery d/t fall in 1/2002     SURGICAL HISTORY OF -       1985 removal of breast cyst     SURGICAL HISTORY OF -   2008    endometrial ablation     YAG CAPSULOTOMY OD (RIGHT EYE)  10/22/2018       Allergies:  Allergies as of 10/21/2022 - Reviewed 09/14/2022   Allergen Reaction Noted     Paclitaxel Anaphylaxis and Difficulty breathing 08/30/2017     Wasps [hornets] Anaphylaxis 09/03/2009     Yellow hornet venom [hornet venom] Anaphylaxis and Swelling 05/10/2018     Yellow jacket venom [venomil honey bee] Anaphylaxis and Swelling 05/10/2018     Sulfa drugs Hives and Rash 07/19/2005       Current Medications:  Current Outpatient Medications   Medication Sig Dispense Refill     Acetaminophen (TYLENOL PO) Take 500 mg by mouth 2 tabs prn pain (monthly)       ASPIRIN NOT PRESCRIBED (INTENTIONAL) Please choose reason not prescribed from choices below.       atorvastatin (LIPITOR) 80 MG tablet TAKE 1 TABLET(80 MG) BY MOUTH DAILY 90 tablet 3     B Complex Vitamins (VITAMIN B-COMPLEX PO) Take 50 mg by mouth        CALCIUM-MAGNESIUM-VITAMIN D PO Take 2 tablets by mouth daily       cephALEXin (KEFLEX) 500 MG capsule Take 1 capsule (500 mg) by mouth 4 times daily 20  capsule 0     Cholecalciferol (VITAMIN D) 1000 UNIT capsule Take 2,000 Units by mouth daily        DiphenhydrAMINE HCl (BENADRYL PO) Take 50 mg by mouth daily as needed (monthly)       EPINEPHrine (ANY BX GENERIC EQUIV) 0.3 MG/0.3ML injection 2-pack Inject 0.3 mLs (0.3 mg) into the muscle once as needed for anaphylaxis 0.3 mL PRN     escitalopram (LEXAPRO) 20 MG tablet Take 1 tablet (20 mg) by mouth daily 90 tablet 1     fluticasone (FLONASE) 50 MCG/ACT nasal spray SHAKE LIQUID AND USE 2 SPRAYS IN EACH NOSTRIL DAILY 48 g 3     LORazepam (ATIVAN) 1 MG tablet Take 1 tablet (1 mg) by mouth every 6 hours as needed (nausea/vomiting, anxiety or sleep) 30 tablet 0     Magnesium Hydroxide (MAGNESIA PO) Take 500 mg by mouth       metFORMIN (GLUCOPHAGE XR) 500 MG 24 hr tablet TAKE 2 TABLETS BY MOUTH EVERY DAY WITH THE EVENING MEAL 180 tablet 3     Multiple Vitamins-Minerals (MULTIVITAMIN ADULT PO) Take 1 tablet by mouth daily       nitroFURantoin macrocrystal-monohydrate (MACROBID) 100 MG capsule TAKE 1 CAPSULE BY MOUTH AFTER INTERCOURSE AS NEEDED 30 capsule PRN     nystatin (MYCOSTATIN) 810894 UNIT/GM external powder Apply topically 2 times daily as needed Apply to folds under breast and abdomen. 60 g 1     Probiotic Product (PRO-BIOTIC BLEND PO) Take 1 capsule by mouth daily Enzymatic Therapy-probiotic pearls       rivaroxaban ANTICOAGULANT (XARELTO) 20 MG TABS tablet Take 1 tablet (20 mg) by mouth daily (with dinner) 30 tablet 11     tamoxifen (NOLVADEX) 10 MG tablet Cut each pill in 1/2 with pill cutter to ensure 5 mg daily dose. 45 tablet 3     traZODone (DESYREL) 50 MG tablet TAKE 1 TABLET  BY MOUTH EVERY NIGHT AS NEEDED FOR SLEEP 180 tablet PRN        Family History:  Family History   Problem Relation Age of Onset     C.A.D. Mother      Hypertension Mother      Breast Cancer Mother      Psychotic Disorder Mother      Colon Cancer Mother      Cancer Mother         Bone cancer     Coronary Artery Disease Mother       Hyperlipidemia Mother         treated w. statins     Other Cancer Mother         bone/marrow, ,  of this     Depression Mother      Anxiety Disorder Mother      Mental Illness Mother         various undiagnosed types, but THERE     Obesity Mother      Bone Cancer Mother      Other - See Comments Mother         psychotic disease      C.A.D. Father      Diabetes Father      Hypertension Father      Cerebrovascular Disease Father         1984 or      Psychotic Disorder Father      Pancreatic Cancer Father      Coronary Artery Disease Father      Hyperlipidemia Father         treated w statins     Other Cancer Father         pancreatic, ,  of this     Depression Father      Mental Illness Father         likely PTSD and depression     Obesity Father      Other - See Comments Father         cerebrovascular disease, psychotic disease      Psychotic Disorder Maternal Grandmother         ?     Coronary Artery Disease Maternal Grandmother          of heart attack age 84?     Depression Maternal Grandmother      Psychotic Disorder Maternal Grandfather         ?     Psychotic Disorder Paternal Grandmother         Schizophernia     Coronary Artery Disease Paternal Grandmother          of heart attack, age 85?     Depression Paternal Grandmother         severe, but recovered     Mental Illness Paternal Grandmother         possible bipolar or other     Schizophrenia Paternal Grandmother      Psychotic Disorder Paternal Grandfather         ?     Respiratory Paternal Grandfather          of emphysema and smoking     Emphysema Paternal Grandfather      Prostate Problems Brother         cleared      Hypertension Brother      Hyperlipidemia Brother         unsure if treated w statins     Depression Brother      Anxiety Disorder Brother      Mental Illness Brother         undiagnosed but THERE     Obesity Brother      Pacemaker Brother      Psychotic Disorder Brother         x2     Depression Brother          major depressive disorder and OCD     Anxiety Disorder Brother      Mental Illness Brother         not sure of diagnoses, treated     Hypertension Brother      Hyperlipidemia Brother      Prostate Cancer Brother      Depression Brother      Anxiety Disorder Brother      Other - See Comments Brother         psychotic disease      Psychotic Disorder Sister         x2     Neurologic Disorder Sister      Hypertension Sister      Hyperlipidemia Sister         treated w statins     Depression Sister      Anxiety Disorder Sister      Obesity Sister      Rashes/Skin Problems Sister         precancerous lesion on leg     Psychotic Disorder Sister      Other - See Comments Sister         small kidney stone      Hypertension Sister      Hyperlipidemia Sister         treated w statins     Depression Sister      Anxiety Disorder Sister      Other - See Comments Sister         psychotic disease      Cancer - colorectal No family hx of      Glaucoma No family hx of      Macular Degeneration No family hx of        Social History:  Social History     Socioeconomic History     Marital status: Single     Spouse name: Not on file     Number of children: 0     Years of education: Not on file     Highest education level: Not on file   Occupational History     Comment: [employer]   Tobacco Use     Smoking status: Never     Smokeless tobacco: Never   Vaping Use     Vaping Use: Never used   Substance and Sexual Activity     Alcohol use: Yes     Comment: Very infrequent, a bit of wine or beer 2x per month maybe     Drug use: Yes     Types: Marijuana     Comment: infrequent, for celebrations only, maybe 8x per year     Sexual activity: Yes     Partners: Male     Birth control/protection: None     Comment: long-time    Other Topics Concern     Parent/sibling w/ CABG, MI or angioplasty before 65F 55M? No   Social History Narrative     Not on file     Social Determinants of Health     Financial Resource Strain: Not on  file   Food Insecurity: Not on file   Transportation Needs: Not on file   Physical Activity: Not on file   Stress: Not on file   Social Connections: Not on file   Intimate Partner Violence: Not At Risk     Fear of Current or Ex-Partner: No     Emotionally Abused: No     Physically Abused: No     Sexually Abused: No   Housing Stability: Not on file       Physical Exam:  [facility] 01/01/2009      GENERAL APPEARANCE: healthy, alert and mild distress, appropriately tearful at times     NECK: no adenopathy, no asymmetry or masses     RESP: lungs clear to auscultation - no rales, rhonchi or wheezes    BREAST: A multipositional, bilateral breast exam was performed.  Fairly symmetrical pendulous breasts, both nipples slightly everted. Right breast: no palpable dominant masses, no nipple discharge, no skin changes. Fibrocystic tissue.  Right axilla: no palpable adenopathy. Left breast: no palpable dominant masses, no nipple discharge, no skin changes. Left axilla: no palpable adenopathy. Fibrocystic tissue.    LYMPHATICS: No cervical, supraclavicular or axillary lymphadenopathy     CARDIOVASCULAR: regular rate and rhythm, normal S1 S2, no S3 or S4 and no murmur.        SKIN: no suspicious lesions or rashes    Laboratory/Imaging Studies  No results found for any visits on 10/21/22.    ASSESSMENT  We discussed her family interval history. There are no new cancers in her family.  She has been physically doing well but has been struggling with some depression. 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  She has been focused on life review and processing her feelings about things as well as grieving for her aunt who recently passed away at age 97.  She is very close to her aunt's daughter and the cousins will be getting together this week in [place].  She continues to focus on mental and physical health is following with Dr. Bertrand at [facility]. She has no breast issues on exam today and will proceed with the following plan.    Individualized Surveillance Plan for women  With 20% or greater lifetime risk of breast cancer   Per NCCN Breast Cancer Screening and Diagnosis Guidelines Version 1.2020   Recommended screening Test or procedure Last done Next Scheduled    Clinical encounter Clinical exam every 6-12 months.   Refer to genetic counseling if not already done.  Consider risk reduction strategies.   10/21/2022   October 2023   However, some family histories with breast cancers at a very young age, may warrant screening starting earlier.    *May begin at age 40 if breast cancers in the family occur at later ages.    Annual mammogram beginning 10 years younger than the earliest breast cancer in the family but not prior to age 30.    Recommend annual breast MRI to begin 10 years younger than the earliest breast cancer in the family but not prior to age 25.    Breast MRIs are preferably done on day 7-15 of the menstrual cycle in premenopausal women. 10/13/2021- Screening tomosynthesis mammogram, BiRads1    4/6/2022- Breast MRI, BiRads1 Mammogram after appt. Today    Breast MRI in April (4/7 or later)    Next exam: October 2023 followed by mammogram   Breast screening for patients at high risk due to thoracic radiation between the ages of 10-30   Annual clinical exam beginning 8 years after radiation therapy.    Annual screening mammogram beginning at age 30 or 8 years after radiation therapy    Annual breast MRI, beginning at age 25 or 8 years after radiation therapy.       NA     NA   Women who have a lifetime risk of >20% based on history of LCIS or ADH/ALH Annual screening mammogram beginning at age of LCIS or ADH/ALH but not prior to age 30.    Consider annual MRI to begin at age of diagnosis of LCIS or ADH/ALH but not prior to age 25.    Consider risk reducing strategies.     NA     NA    Recommend risk reducing strategies for women with 1.7% 5  year risk of breast cancer.       I spent a total of 30 minutes on the day of the visit. Please see the note for further information on patient assessment and treatment.    MARLON Chiu-CNS, OCN, AGN-BC  Clinical Nurse Specialist  Cancer Risk Management Program  [facility];  MARLON Reilly-CNP

## 2022-10-21 NOTE — PATIENT INSTRUCTIONS
Individualized Surveillance Plan for women  With 20% or greater lifetime risk of breast cancer   Per NCCN Breast Cancer Screening and Diagnosis Guidelines Version 1.2020   Recommended screening Test or procedure Last done Next Scheduled    Clinical encounter Clinical exam every 6-12 months.   Refer to genetic counseling if not already done.  Consider risk reduction strategies.   10/21/2022   October 2023   However, some family histories with breast cancers at a very young age, may warrant screening starting earlier.    *May begin at age 40 if breast cancers in the family occur at later ages.    Annual mammogram beginning 10 years younger than the earliest breast cancer in the family but not prior to age 30.    Recommend annual breast MRI to begin 10 years younger than the earliest breast cancer in the family but not prior to age 25.    Breast MRIs are preferably done on day 7-15 of the menstrual cycle in premenopausal women. 10/13/2021- Screening tomsoynthesis mammogram, BiRads1    4/6/2022- Breast MRI, BiRads1 Mammogram after appt. Today    Breast MRI in April (4/7 or later)    Next exam: October 2023 followed by mammogram   Breast screening for patients at high risk due to thoracic radiation between the ages of 10-30   Annual clinical exam beginning 8 years after radiation therapy.    Annual screening mammogram beginning at age 30 or 8 years after radiation therapy    Annual breast MRI, beginning at age 25 or 8 years after radiation therapy.       NA     NA   Women who have a lifetime risk of >20% based on history of LCIS or ADH/ALH Annual screening mammogram beginning at age of LCIS or ADH/ALH but not prior to age 30.    Consider annual MRI to begin at age of diagnosis of LCIS or ADH/ALH but not prior to age 25.    Consider risk reducing strategies.     NA     NA    Recommend risk reducing strategies for women with 1.7% 5 year risk of breast cancer.

## 2022-10-21 NOTE — NURSING NOTE
"Oncology Rooming Note    October 21, 2022 1:51 PM   Hafsa Corona is a 67 year old female who presents for:    Chief Complaint   Patient presents with     Oncology Clinic Visit     Breast cancer screening, high risk      Initial Vitals: /74   Pulse 91   Temp 98.3  F (36.8  C) (Oral)   Wt 95.8 kg (211 lb 3.2 oz)   LMP 01/01/2009   SpO2 95%   BMI 37.41 kg/m   Estimated body mass index is 37.41 kg/m  as calculated from the following:    Height as of 8/17/22: 1.6 m (5' 3\").    Weight as of this encounter: 95.8 kg (211 lb 3.2 oz). Body surface area is 2.06 meters squared.  No Pain (0) Comment: Data Unavailable   Patient's last menstrual period was 01/01/2009.  Allergies reviewed: Yes  Medications reviewed: Yes    Medications: Medication refills not needed today.  Pharmacy name entered into EPIC:    [id number] - [place], [place] - [address] AT [facility] [address]  [id number] - [place], [place] - [address] [facility] [address]  [facility] [place], [place] - [phone]  [facility] [place], [place] - [address]  [id number] - [place], [place] - [address] AT [facility] OF [address]    Clinical concerns: none       Coni Mora"

## 2022-10-21 NOTE — NURSING NOTE
Chief Complaint   Patient presents with     Blood Draw     VPT blood draw from right hand by lab RN     Olesya Lao RN

## 2022-10-21 NOTE — PROGRESS NOTES
Oncology Risk Management Consultation:  Date on this visit: 10/21/2022    Hafsa Corona  requires heightened screening and surveillance for her higher risk of breast cancer, secondary to a family history of breast cancer in her mother at age 63.    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 She is considered to at high risk for breast cancer and has a 30.3% lifetime risk for breast cancer by the SHERON model. She has a 10.8% 5 year risk by the SHERON model.      Primary Physician:  MARLON Reilly-CNP     History Of Present Illness:  Ms. Corona is a very pleasant  67 year old female who presents with a family history of breast cancer.      Genetic testin2017 - Negative for mutations in the AIP, ALK, APC, COSTA, BAP1, BARD1, BLM, BRCA1, BRCA2, BRIP1, BMPR1A, CDH1, CDK4, CDKN1B, CDKN2A, CHEK2, DICER1, EPCAM, FANCC, FH, FLCN, GALNT12, GREM1, HOXB13, MAX, MEN1, MET, MITF, MLH1, MRE11A, MSH2, MSH6, MUTYH, NBN, NF1, NF2, PALB2, PHOX2B, PMS2, POLD1, POLE, POT1, JMQQK8X, PTCH1, PTEN, RAD50, RAD51C, RAD51D, RB1, RET, SDHA, SDHAF2, SDHB, SDHC, SDHD, SMAD4, SMARCA4, SMARCB1, SMARCE1, STK11, SUFU, CAPV781, TP53, TSC1, TSC2, VHL, and XRCC2 genes using a Cancer-Next Expanded panel through [employer].     Pertinent history:  2007- R salpingo oophorectomy for abnormal imaging (enlarged ovary), biopsy showed endometriosis  2017 - Stage 1C2 Clear cell carcinoma of the L ovary, s/p HELENE/LSO and chemotherapy.19 cm cystic mass  18: R nephrectomy with Dr. 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   Nulliparous.  Menarche at 10.  Menopause: age 54  Hx of OCPs x6 years.  No hx of HRT.  Density: heterogeneously dense  No history of breast biopsy.  Hx of breast cyst in , aspirated   No history of hyperplasia, atypia, or malignancy  On low dose tamoxifen since 10/10/2019 for prevention of breast cancer. Continue through November  2024     Pertinent screening history:  6/2011 - Screening mammogram, normal by report   2/1/2014 - Screening mammogram, normal by report  9/15/2017 - Screening mammogram, BiRads1.    4/5/2018 - Breast MRI, BiRads2.  9/21/2018: Screening tomosynthesis mammogram, BI-RADS 1.  4/9/2019: Breast MRI, BI-RADS 1.  9/27/2019- Screening tomosynthesis mammogram, BiRads1  10/1/2020- Screening mammogram, BiRads1  3/29/2021- Breast MRI, BiRads1  10/13/2021- Screening tomosynthesis mammogram, BiRads1  4/6/2022- Breast MRI, BiRads1     At this visit, she denies asymmetry, lumps, masses, thickening, pain, nipple discharge and skin changes    Past Medical/Surgical History:  Past Medical History:   Diagnosis Date     Anxiety state, unspecified     [id number]     Arthritis 2014    vague, gradual, not treated really, knees feel it     At high risk for breast cancer 09/27/2019    30.3% lifetime risk by SHERON model     Attention deficit disorder without mention of hyperactivity      Cancer (H) 7/2017 and 1/2018    ovarian and kidney cancers, both treated well     Chronic rhinitis      Depressive disorder lifetime    plus Anxiety plus ADD. Escitalipram, therapy, ADD meds maybe     Heart disease 1999    tachycardia and high cholesterol, managed     History of blood transfusion 07/2017    while in hospital for ovarian cancer     Hypertension 1999    tho BP was too LOW last week. past 12 years Generally OK     Panic disorder without agoraphobia      Pure hypercholesterolemia     Lipitor     Tachycardia, unspecified     9/1999-2/2004     Type II or unspecified type diabetes mellitus without mention of complication, not stated as uncontrolled     diagnosed 2004     Past Surgical History:   Procedure Laterality Date     ABSCESS DRAINAGE Left     left leg      AS REMV KIDNEY,RADICAL Right      BIOPSY  7/2017 and 2/2018    ovarian and kidney cancer biopsies. also 1987 breast benign     BREAST CYST EXCISION  1985     BREAST SURGERY  1987    date  unsure. benign breast cyst removed     CATARACT IOL, RT/LT Left 05/18/2018     CATARACT IOL, RT/LT Right 07/1318     COLONOSCOPY  2008 and 2013    polyps removed. Next due fall 2018     ENDOMETRIAL ABLATION  2008     EXCISE MASS BACK N/A 12/6/2021    Procedure: EXCISION, MASS, BACK;  Surgeon: Norris Vela MD;  Location: [facility] OR     HYSTERECTOMY TOTAL ABDOMINAL, BILATERAL SALPINGO-OOPHORECTOMY, NODE DISSECTION, COMBINED Left 7/7/2017    Procedure: COMBINED HYSTERECTOMY TOTAL ABDOMINAL, SALPINGO-OOPHORECTOMY, NODE DISSECTION;  Exploratory Laparotomy, Left Salpingo-Oophorectomy, Cancer Staging, Total Hysterectomy, Omentectomy, Evacuation of abdominal fluid, Lymph Node Dissection  Anesthesia Block ;  Surgeon: Serena Cole MD;  Location: [facility] OR     HYSTERECTOMY, PAP NO LONGER INDICATED  07/07/2017    Laparotomy; for ovarian cancer staging     HYSTERECTOMY, PAP NO LONGER INDICATED       INSERT PORT VASCULAR ACCESS Right 8/21/2017    Procedure: INSERT PORT VASCULAR ACCESS;  Single Lumen Chest Power Port;  Surgeon: Stephen Mike PA-C;  [facility]     IR PORT REMOVAL RIGHT  11/12/2021     LYMPHADENECTOMY RETROPERITONEAL Bilateral 07/07/2017    Laparotomy; pelvics & para-aortics; for ovarian cancer staging     OMENTECTOMY  07/07/2017    Laparotomy; for ovarian cancer staging     ORTHOPEDIC SURGERY  1/2002    broken left jaw due to fall, 4 fractures, titanium plates     OTHER SURGICAL HISTORY  01/2002    OTHER SURGICAL HISTORYfacial surgery due to fall      PHACOEMULSIFICATION CLEAR CORNEA WITH STANDARD INTRAOCULAR LENS IMPLANT Right 7/13/2018    Procedure: PHACOEMULSIFICATION CLEAR CORNEA WITH STANDARD INTRAOCULAR LENS IMPLANT;  RIght Eye Phacoemulsification Clear Cornea with Standard Intraocular Lens Placement ;  Surgeon: Mary Jo Massey MD;  [facility]     PHACOEMULSIFICATION CLEAR CORNEA WITH TORIC INTRAOCULAR LENS IMPLANT Left 5/18/2018    Procedure: PHACOEMULSIFICATION CLEAR CORNEA  WITH TORIC INTRAOCULAR LENS IMPLANT;  Left Eye Phacoemulsification with Toric Lens;  Surgeon: Mary Jo Massey MD;  [facility]     REMOVE PORT VASCULAR ACCESS Right 11/12/2021    Procedure: REMOVAL, VASCULAR ACCESS PORT right;  Surgeon: Afsaneh Das PA-C;  Location: [facility] OR     SALPINGO OOPHORECTOMY,R/L/KAYY Right 2008    Salpingo Oophorectomy, RT     SALPINGO OOPHORECTOMY,R/L/KAYY Left 07/07/2017    Laparotomy; for ovarian cancer staging     SALPINGOOPHORECTOMY  2008     SURGICAL HISTORY OF -       facial surgery d/t fall in 1/2002     SURGICAL HISTORY OF -       1985 removal of breast cyst     SURGICAL HISTORY OF -   2008    endometrial ablation     YAG CAPSULOTOMY OD (RIGHT EYE)  10/22/2018       Allergies:  Allergies as of 10/21/2022 - Reviewed 09/14/2022   Allergen Reaction Noted     Paclitaxel Anaphylaxis and Difficulty breathing 08/30/2017     Wasps [hornets] Anaphylaxis 09/03/2009     Yellow hornet venom [hornet venom] Anaphylaxis and Swelling 05/10/2018     Yellow jacket venom [venomil honey bee] Anaphylaxis and Swelling 05/10/2018     Sulfa drugs Hives and Rash 07/19/2005       Current Medications:  Current Outpatient Medications   Medication Sig Dispense Refill     Acetaminophen (TYLENOL PO) Take 500 mg by mouth 2 tabs prn pain (monthly)       ASPIRIN NOT PRESCRIBED (INTENTIONAL) Please choose reason not prescribed from choices below.       atorvastatin (LIPITOR) 80 MG tablet TAKE 1 TABLET(80 MG) BY MOUTH DAILY 90 tablet 3     B Complex Vitamins (VITAMIN B-COMPLEX PO) Take 50 mg by mouth        CALCIUM-MAGNESIUM-VITAMIN D PO Take 2 tablets by mouth daily       cephALEXin (KEFLEX) 500 MG capsule Take 1 capsule (500 mg) by mouth 4 times daily 20 capsule 0     Cholecalciferol (VITAMIN D) 1000 UNIT capsule Take 2,000 Units by mouth daily        DiphenhydrAMINE HCl (BENADRYL PO) Take 50 mg by mouth daily as needed (monthly)       EPINEPHrine (ANY BX GENERIC EQUIV) 0.3 MG/0.3ML injection 2-pack Inject  0.3 mLs (0.3 mg) into the muscle once as needed for anaphylaxis 0.3 mL PRN     escitalopram (LEXAPRO) 20 MG tablet Take 1 tablet (20 mg) by mouth daily 90 tablet 1     fluticasone (FLONASE) 50 MCG/ACT nasal spray SHAKE LIQUID AND USE 2 SPRAYS IN EACH NOSTRIL DAILY 48 g 3     LORazepam (ATIVAN) 1 MG tablet Take 1 tablet (1 mg) by mouth every 6 hours as needed (nausea/vomiting, anxiety or sleep) 30 tablet 0     Magnesium Hydroxide (MAGNESIA PO) Take 500 mg by mouth       metFORMIN (GLUCOPHAGE XR) 500 MG 24 hr tablet TAKE 2 TABLETS BY MOUTH EVERY DAY WITH THE EVENING MEAL 180 tablet 3     Multiple Vitamins-Minerals (MULTIVITAMIN ADULT PO) Take 1 tablet by mouth daily       nitroFURantoin macrocrystal-monohydrate (MACROBID) 100 MG capsule TAKE 1 CAPSULE BY MOUTH AFTER INTERCOURSE AS NEEDED 30 capsule PRN     nystatin (MYCOSTATIN) 181257 UNIT/GM external powder Apply topically 2 times daily as needed Apply to folds under breast and abdomen. 60 g 1     Probiotic Product (PRO-BIOTIC BLEND PO) Take 1 capsule by mouth daily Enzymatic Therapy-probiotic pearls       rivaroxaban ANTICOAGULANT (XARELTO) 20 MG TABS tablet Take 1 tablet (20 mg) by mouth daily (with dinner) 30 tablet 11     tamoxifen (NOLVADEX) 10 MG tablet Cut each pill in 1/2 with pill cutter to ensure 5 mg daily dose. 45 tablet 3     traZODone (DESYREL) 50 MG tablet TAKE 1 TABLET  BY MOUTH EVERY NIGHT AS NEEDED FOR SLEEP 180 tablet PRN        Family History:  Family History   Problem Relation Age of Onset     C.A.D. Mother      Hypertension Mother      Breast Cancer Mother      Psychotic Disorder Mother      Colon Cancer Mother      Cancer Mother         Bone cancer     Coronary Artery Disease Mother      Hyperlipidemia Mother         treated w. statins     Other Cancer Mother         bone/marrow, ,  of this     Depression Mother      Anxiety Disorder Mother      Mental Illness Mother         various undiagnosed types, but THERE     Obesity Mother       Bone Cancer Mother      Other - See Comments Mother         psychotic disease      C.A.D. Father      Diabetes Father      Hypertension Father      Cerebrovascular Disease Father         1984 or      Psychotic Disorder Father      Pancreatic Cancer Father      Coronary Artery Disease Father      Hyperlipidemia Father         treated w statins     Other Cancer Father         pancreatic, ,  of this     Depression Father      Mental Illness Father         likely PTSD and depression     Obesity Father      Other - See Comments Father         cerebrovascular disease, psychotic disease      Psychotic Disorder Maternal Grandmother         ?     Coronary Artery Disease Maternal Grandmother          of heart attack age 84?     Depression Maternal Grandmother      Psychotic Disorder Maternal Grandfather         ?     Psychotic Disorder Paternal Grandmother         Schizophernia     Coronary Artery Disease Paternal Grandmother          of heart attack, age 85?     Depression Paternal Grandmother         severe, but recovered     Mental Illness Paternal Grandmother         possible bipolar or other     Schizophrenia Paternal Grandmother      Psychotic Disorder Paternal Grandfather         ?     Respiratory Paternal Grandfather          of emphysema and smoking     Emphysema Paternal Grandfather      Prostate Problems Brother         cleared      Hypertension Brother      Hyperlipidemia Brother         unsure if treated w statins     Depression Brother      Anxiety Disorder Brother      Mental Illness Brother         undiagnosed but THERE     Obesity Brother      Pacemaker Brother      Psychotic Disorder Brother         x2     Depression Brother         major depressive disorder and OCD     Anxiety Disorder Brother      Mental Illness Brother         not sure of diagnoses, treated     Hypertension Brother      Hyperlipidemia Brother      Prostate Cancer Brother      Depression Brother      Anxiety  Disorder Brother      Other - See Comments Brother         psychotic disease      Psychotic Disorder Sister         x2     Neurologic Disorder Sister      Hypertension Sister      Hyperlipidemia Sister         treated w statins     Depression Sister      Anxiety Disorder Sister      Obesity Sister      Rashes/Skin Problems Sister         precancerous lesion on leg     Psychotic Disorder Sister      Other - See Comments Sister         small kidney stone      Hypertension Sister      Hyperlipidemia Sister         treated w statins     Depression Sister      Anxiety Disorder Sister      Other - See Comments Sister         psychotic disease      Cancer - colorectal No family hx of      Glaucoma No family hx of      Macular Degeneration No family hx of        Social History:  Social History     Socioeconomic History     Marital status: Single     Spouse name: Not on file     Number of children: 0     Years of education: Not on file     Highest education level: Not on file   Occupational History     Comment: [employer]   Tobacco Use     Smoking status: Never     Smokeless tobacco: Never   Vaping Use     Vaping Use: Never used   Substance and Sexual Activity     Alcohol use: Yes     Comment: Very infrequent, a bit of wine or beer 2x per month maybe     Drug use: Yes     Types: Marijuana     Comment: infrequent, for celebrations only, maybe 8x per year     Sexual activity: Yes     Partners: Male     Birth control/protection: None     Comment: long-time    Other Topics Concern     Parent/sibling w/ CABG, MI or angioplasty before 65F 55M? No   Social History Narrative     Not on file     Social Determinants of Health     Financial Resource Strain: Not on file   Food Insecurity: Not on file   Transportation Needs: Not on file   Physical Activity: Not on file   Stress: Not on file   Social Connections: Not on file   Intimate Partner Violence: Not At Risk     Fear of Current or Ex-Partner: No     Emotionally  Abused: No     Physically Abused: No     Sexually Abused: No   Housing Stability: Not on file       Physical Exam:  LMP 01/01/2009      GENERAL APPEARANCE: healthy, alert and mild distress, appropriately tearful at times     NECK: no adenopathy, no asymmetry or masses     RESP: lungs clear to auscultation - no rales, rhonchi or wheezes    BREAST: A multipositional, bilateral breast exam was performed.  Fairly symmetrical pendulous breasts, both nipples slightly everted. Right breast: no palpable dominant masses, no nipple discharge, no skin changes. Fibrocystic tissue.  Right axilla: no palpable adenopathy. Left breast: no palpable dominant masses, no nipple discharge, no skin changes. Left axilla: no palpable adenopathy. Fibrocystic tissue.    LYMPHATICS: No cervical, supraclavicular or axillary lymphadenopathy     CARDIOVASCULAR: regular rate and rhythm, normal S1 S2, no S3 or S4 and no murmur.        SKIN: no suspicious lesions or rashes    Laboratory/Imaging Studies  No results found for any visits on 10/21/22.    ASSESSMENT  We discussed her family interval history. There are no new cancers in her family.  She has been physically doing well but has been struggling with some depression. 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  She has been focused on life review and processing her feelings about things as well as grieving for her aunt who recently passed away at age 97.  She is very close to her aunt's daughter and the cousins will be getting together this week in [place].  She continues to focus on mental and physical health is following with Dr. Bertrand at [facility]. She has no breast issues on exam today and will proceed with the following plan.    Individualized Surveillance Plan for women  With 20% or greater lifetime risk of breast cancer   Per NCCN Breast Cancer Screening and Diagnosis Guidelines Version  1.2020   Recommended screening Test or procedure Last done Next Scheduled    Clinical encounter Clinical exam every 6-12 months.   Refer to genetic counseling if not already done.  Consider risk reduction strategies.   10/21/2022   October 2023   However, some family histories with breast cancers at a very young age, may warrant screening starting earlier.    *May begin at age 40 if breast cancers in the family occur at later ages.    Annual mammogram beginning 10 years younger than the earliest breast cancer in the family but not prior to age 30.    Recommend annual breast MRI to begin 10 years younger than the earliest breast cancer in the family but not prior to age 25.    Breast MRIs are preferably done on day 7-15 of the menstrual cycle in premenopausal women. 10/13/2021- Screening tomosynthesis mammogram, BiRads1    4/6/2022- Breast MRI, BiRads1 Mammogram after appt. Today    Breast MRI in April (4/7 or later)    Next exam: October 2023 followed by mammogram   Breast screening for patients at high risk due to thoracic radiation between the ages of 10-30   Annual clinical exam beginning 8 years after radiation therapy.    Annual screening mammogram beginning at age 30 or 8 years after radiation therapy    Annual breast MRI, beginning at age 25 or 8 years after radiation therapy.       NA     NA   Women who have a lifetime risk of >20% based on history of LCIS or ADH/ALH Annual screening mammogram beginning at age of LCIS or ADH/ALH but not prior to age 30.    Consider annual MRI to begin at age of diagnosis of LCIS or ADH/ALH but not prior to age 25.    Consider risk reducing strategies.     NA     NA    Recommend risk reducing strategies for women with 1.7% 5 year risk of breast cancer.       I spent a total of 30 minutes on the day of the visit. Please see the note for further information on patient assessment and treatment.    Stephie Karimi, APRN-CNS, OCN, AGN-BC  Clinical Nurse Specialist  Cancer  Risk Management Program  [facility] [place]    CC;  Morelia Ayon,APRN-CNP

## 2022-10-26 NOTE — PROGRESS NOTES
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 She completed treatment with surgery and three cycles of chemotherapy on 10/16/17. She is here today for follow up and surveillance.    Oncology History:  The patient has had a recent episode of lower abdominal pain in early July.  She was subsequently sent to the emergency room and was found to have a large 9 cm complex ovarian mass. She was admitted to the hospital for pain control.  7/3/17:  1187  17: Exploratory laparotomy, total abdominal hysterectomy, left salpingo-oophorectomy, cancer staging including infracolic omentectomy, bilateral pelvic & para-aortic lymphadenectomy, peritoneal biopsies, evacuation of pelvic fluid by Dr. Cole.    FINAL DIAGNOSIS:   A. LEFT OVARY AND FALLOPIAN TUBE, LEFT SALPINGO-OOPHORECTOMY:   - Ovarian clear cell carcinoma   - Background of endometriosis   - Fallopian tube with no significant histologic abnormality.   B. UTERUS, HYSTERECTOMY:   -  Inactive endometrium   -  Myometrium with adenomyosis and leiomyoma.   -  Cervix with squamous metaplasia.   C. PERITONEUM, RIGHT PELVIS, BIOPSY:   - Fibroadipose tissue, negative for malignancy.   D. LYMPH NODES, RIGHT PELVIC, DISSECTION:   - Thirteen benign lymph nodes (0/13).   E. LYMPH NODES, LEFT PELVIC, DISSECTION:   - Seven benign lymph nodes (0/7).   F. PERITONEUM, LEFT PELVIS, BIOPSY:   - Fibroadipose tissue, negative for malignancy.   G. PERITONEUM, BLADDER, BIOPSY:   - Fibroadipose tissue  with acute and chronic inflammation, negative for malignancy.   H. PERITONEUM, POSTERIOR CUL-DE-SAC, BIOPSY:   - Fibroadipose tissue, negative for malignancy.   I. PERITONEUM, LEFT PARACOLIC GUTTER, BIOPSY:   - Fibroadipose tissue, negative for malignancy.   J. LYMPH NODES, LEFT PARA-AORTIC,  DISSECTION:   - Five benign lymph nodes (0/5).   K. LYMPH NODES, RIGHT PARA-AORTIC, DISSECTION:   - Two benign lymph nodes (0/2).   L. PERITONEUM, RIGHT PARACOLIC GUTTER, BIOPSY:   - Fibroadipose tissue, negative for malignancy.   M. OMENTUM, OMENTECTOMY:   - Adipose tissue with reactive changes, negative for malignancy.   COMMENT:   The final diagnosis confirms the interpretation provided intraoperatively.   Report Name: Ovary or Fallopian Tube   Status: Submitted   Part(s) Involved:   A: Ovary and fallopian tube, left   Synoptic Report:   CLINICAL   Clinical History:   - Pelvic mass   SPECIMEN   Procedure:   - Left salpingo-oophorectomy   - Hysterectomy   - Omentectomy   - Peritoneal  biopsies   Lymph Node Sampling:   - Performed   Location:   - Pelvic lymph nodes   - Para-aortic lymph nodes   Specimen Integrity:   - Left ovary   Specimen Integrity of Left Ovary:   - Capsule intact   TUMOR   Primary Tumor Site(s):   - Left ovary   Left Ovary   Tumor Size of Left Ovary: 19 cm   Histologic Type:   - Clear cell carcinoma   Tumor Extent   Ovarian Surface Involvement:   - Absent   Specimen(s)   Extent of Left Ovary:   - Involved   Extent of Left Fallopian Tube:   - Not involved   Extent of Omentum:   - Not involved   Extent of Uterus:   - Not involved   Extent of Peritoneum:   - Not involved   Peritoneal Ascitic Fluid:   - Not performed / unknown   Pleural Fluid:   - Not performed / unknown   LYMPH NODES     Number of Pelvic Lymph Nodes Examined: 20     Number of Pelvic Lymph Nodes Involved: None identified     Number of Para-aortic Lymph Nodes Examined: 7     Number of Para-Aortic Lymph Nodes Involved: None identified   STAGE (PTNM, AJCC 7TH ED.)   Primary Tumor (pT):   - Ovary   Ovarian Primary Tumor (pT):   - pT1a: Tumor limited to 1 ovary; capsule intact, no tumor on ovarian surface. No malignant cells in ascites or peritoneal washings#   Regional Lymph Nodes (pN):   - pN0: No regional lymph node metastasis        07/31/17: Dr Shaffer follow up: Discussed with the patient that given the high risk histology, as well as ruptured mass before surgery, she would be qualified at higher risk of recurrence despite early stage ovarian cancer.  Recommended adjuvant chemotherapy. Plan for carboplatin of AUC of 6 and paclitaxel of 175, m2 for 3 cycles. Referral for genetic counselor and will see her back after those 3 cycles  08/03/17: Call from patient stating she is going for a second opinion and would like chemotherapy rescheduled to week of 8/14/17.      08/16/17: Cycle #1 Carbo/Taxol.  73. Significant paclitaxel reaction.  08/30/17: C1 carboplatin/inpatient paclitaxel desensitization.   09/25/17: C2 carboplatin/paclitaxel- inpatient paclitaxel desensitization.  15.  10/16/17: C3 carboplatin/paclitaxel- switched to docetaxel given her neuropathy.  10.  11/14/17:  8. CT CAP:  IMPRESSION:   1. Post surgical changes of hysterectomy and bilateral  salpingo-oophorectomy. Nodular soft tissue density in the posterior  cul-de-sac along with ill-defined nodular thickening in the left  pelvis, suspicious for residual disease or metastatic deposits.   2. There is a 3 cm mass in the superior pole of the right kidney,  possibly extending into the renal hilum. Represents RCC until proven  otherwise. Consider renal mass protocol CT to assess renal vein  involvement.  3. Stable sub-4 mm pulmonary nodules, continued attention on  follow-up.     11/16/17: CT renal mass protocol  IMPRESSION:  1. Arterially enhancing mass measuring 3.6 x 2.1 x 2.2 cm mass arising  from superior posterior of the right kidney, this is renal cell  carcinoma until proven otherwise.  2. Stable fat-containing umbilical hernia.     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 Margins negative. Three month surveillance plan with [facility].     2/26/18:  14     5/22/18:  11      8/23/18:   11     12/12/18:  10     3/8/19:  12     6/19/19:  13     9/23/19:  12    11/8/19: CT Chest:   IMPRESSION:   1. Stable, sub-4 mm pulmonary nodules as above.  2. Stable hypodense, subcentimeter nodules in the thyroid.     3/27/20:  14    10/1/20:  15    3/29/21:  10    7/18/21 CT A/P for abdominal pain in ER: IMPRESSION:   1.  No acute process in the abdomen or pelvis.  2.  Colonic diverticulosis without evidence for diverticulitis.  3.  Post right nephrectomy, hysterectomy, and oophorectomy    10/5/2021:  13  4/1/22:  13  10/21/22:  15            Today Hafsa comes to the clinic without any gynecological concerns. Pt is meeting with urologist in November for urinary incontinence. Tried pelvic floor PT without much improvement.  She is sexually active with her boyfriend without pain or bleeding. She is on Xarelto daily for hx of DVT. She denies any vaginal bleeding, no changes in her bowel or bladder habits, no nausea/emesis, and no difficulties eating. She denies any abdominal discomfort/bloating, no fevers or chills, and no chest pain or shortness of breath. She up to date on breast screening (high risk screening through [facility]). She is up to date on her colonoscopy (1/6/20) and mammogram. Unfortunately Hafsa lost her job and is currently looking for a new one.        ROS: 10 point ROS neg other than the symptoms noted above in the HPI.      Past Medical History:    Past Medical History:   Diagnosis Date     Anxiety state, unspecified     [id number]     Arthritis 2014    vague, gradual, not treated really, knees feel it     At high risk for breast cancer 09/27/2019    30.3% lifetime risk by SHERON model     Attention deficit disorder without mention of hyperactivity      Cancer (H) 7/2017 and 1/2018    ovarian and kidney cancers, both treated well     Chronic rhinitis      Depressive disorder lifetime    plus Anxiety plus ADD. Escitalipram, therapy,  ADD meds maybe     Heart disease 1999    tachycardia and high cholesterol, managed     History of blood transfusion 07/2017    while in hospital for ovarian cancer     Hypertension 1999    tho BP was too LOW last week. past 12 years Generally OK     Panic disorder without agoraphobia      Pure hypercholesterolemia     Lipitor     Tachycardia, unspecified     9/1999-2/2004     Type II or unspecified type diabetes mellitus without mention of complication, not stated as uncontrolled     diagnosed 2004         Past Surgical History:    Past Surgical History:   Procedure Laterality Date     ABSCESS DRAINAGE Left     left leg      AS REMV KIDNEY,RADICAL Right      BIOPSY  7/2017 and 2/2018    ovarian and kidney cancer biopsies. also 1987 breast benign     BREAST CYST EXCISION  1985     BREAST SURGERY  1987    date unsure. benign breast cyst removed     CATARACT IOL, RT/LT Left 05/18/2018     CATARACT IOL, RT/LT Right 07/1318     COLONOSCOPY  2008 and 2013    polyps removed. Next due fall 2018     ENDOMETRIAL ABLATION  2008     EXCISE MASS BACK N/A 12/6/2021    Procedure: EXCISION, MASS, BACK;  Surgeon: Norris Vela MD;  Location: [facility] OR     HYSTERECTOMY TOTAL ABDOMINAL, BILATERAL SALPINGO-OOPHORECTOMY, NODE DISSECTION, COMBINED Left 7/7/2017    Procedure: COMBINED HYSTERECTOMY TOTAL ABDOMINAL, SALPINGO-OOPHORECTOMY, NODE DISSECTION;  Exploratory Laparotomy, Left Salpingo-Oophorectomy, Cancer Staging, Total Hysterectomy, Omentectomy, Evacuation of abdominal fluid, Lymph Node Dissection  Anesthesia Block ;  Surgeon: Serena Cole MD;  Location: [facility]     HYSTERECTOMY, PAP NO LONGER INDICATED  07/07/2017    Laparotomy; for ovarian cancer staging     HYSTERECTOMY, PAP NO LONGER INDICATED       INSERT PORT VASCULAR ACCESS Right 8/21/2017    Procedure: INSERT PORT VASCULAR ACCESS;  Single Lumen Chest Power Port;  Surgeon: Stephen Mike PA-C;  [facility]     IR PORT REMOVAL RIGHT  11/12/2021      LYMPHADENECTOMY RETROPERITONEAL Bilateral 07/07/2017    Laparotomy; pelvics & para-aortics; for ovarian cancer staging     OMENTECTOMY  07/07/2017    Laparotomy; for ovarian cancer staging     ORTHOPEDIC SURGERY  1/2002    broken left jaw due to fall, 4 fractures, titanium plates     OTHER SURGICAL HISTORY  01/2002    OTHER SURGICAL HISTORYfacial surgery due to fall      PHACOEMULSIFICATION CLEAR CORNEA WITH STANDARD INTRAOCULAR LENS IMPLANT Right 7/13/2018    Procedure: PHACOEMULSIFICATION CLEAR CORNEA WITH STANDARD INTRAOCULAR LENS IMPLANT;  RIght Eye Phacoemulsification Clear Cornea with Standard Intraocular Lens Placement ;  Surgeon: Mary Jo Massey MD;  [facility]     PHACOEMULSIFICATION CLEAR CORNEA WITH TORIC INTRAOCULAR LENS IMPLANT Left 5/18/2018    Procedure: PHACOEMULSIFICATION CLEAR CORNEA WITH TORIC INTRAOCULAR LENS IMPLANT;  Left Eye Phacoemulsification with Toric Lens;  Surgeon: Mary Jo Massey MD;  [facility]     REMOVE PORT VASCULAR ACCESS Right 11/12/2021    Procedure: REMOVAL, VASCULAR ACCESS PORT right;  Surgeon: Afsaneh Das PA-C;  Location: [facility] OR     SALPINGO OOPHORECTOMY,R/L/KAYY Right 2008    Salpingo Oophorectomy, RT     SALPINGO OOPHORECTOMY,R/L/KAYY Left 07/07/2017    Laparotomy; for ovarian cancer staging     SALPINGOOPHORECTOMY  2008     SURGICAL HISTORY OF -       facial surgery d/t fall in 1/2002     SURGICAL HISTORY OF -       1985 removal of breast cyst     SURGICAL HISTORY OF -   2008    endometrial ablation     YAG CAPSULOTOMY OD (RIGHT EYE)  10/22/2018         Health Maintenance Due   Topic Date Due     ZOSTER IMMUNIZATION (1 of 2) Never done     EYE EXAM  11/28/2019     MEDICARE ANNUAL WELLNESS VISIT  12/01/2021     COVID-19 Vaccine (4 - Booster for Pfizer series) 01/17/2022       Current Medications:     Current Outpatient Medications   Medication Sig Dispense Refill     Acetaminophen (TYLENOL PO) Take 500 mg by mouth 2 tabs prn pain (monthly)       ASPIRIN NOT  PRESCRIBED (INTENTIONAL) Please choose reason not prescribed from choices below.       atorvastatin (LIPITOR) 80 MG tablet TAKE 1 TABLET(80 MG) BY MOUTH DAILY 90 tablet 3     B Complex Vitamins (VITAMIN B-COMPLEX PO) Take 50 mg by mouth        CALCIUM-MAGNESIUM-VITAMIN D PO Take 2 tablets by mouth daily       cephALEXin (KEFLEX) 500 MG capsule Take 1 capsule (500 mg) by mouth 4 times daily 20 capsule 0     Cholecalciferol (VITAMIN D) 1000 UNIT capsule Take 2,000 Units by mouth daily        DiphenhydrAMINE HCl (BENADRYL PO) Take 50 mg by mouth daily as needed (monthly)       EPINEPHrine (ANY BX GENERIC EQUIV) 0.3 MG/0.3ML injection 2-pack Inject 0.3 mLs (0.3 mg) into the muscle once as needed for anaphylaxis 0.3 mL PRN     escitalopram (LEXAPRO) 20 MG tablet Take 1 tablet (20 mg) by mouth daily 90 tablet 1     fluticasone (FLONASE) 50 MCG/ACT nasal spray SHAKE LIQUID AND USE 2 SPRAYS IN EACH NOSTRIL DAILY 48 g 3     LORazepam (ATIVAN) 1 MG tablet Take 1 tablet (1 mg) by mouth every 6 hours as needed (nausea/vomiting, anxiety or sleep) 30 tablet 0     Magnesium Hydroxide (MAGNESIA PO) Take 500 mg by mouth       metFORMIN (GLUCOPHAGE XR) 500 MG 24 hr tablet TAKE 2 TABLETS BY MOUTH EVERY DAY WITH THE EVENING MEAL 180 tablet 3     Multiple Vitamins-Minerals (MULTIVITAMIN ADULT PO) Take 1 tablet by mouth daily       nitroFURantoin macrocrystal-monohydrate (MACROBID) 100 MG capsule TAKE 1 CAPSULE BY MOUTH AFTER INTERCOURSE AS NEEDED 30 capsule PRN     nystatin (MYCOSTATIN) 586767 UNIT/GM external powder Apply topically 2 times daily as needed Apply to folds under breast and abdomen. 60 g 1     Probiotic Product (PRO-BIOTIC BLEND PO) Take 1 capsule by mouth daily Enzymatic Therapy-probiotic pearls       rivaroxaban ANTICOAGULANT (XARELTO) 20 MG TABS tablet Take 1 tablet (20 mg) by mouth daily (with dinner) 30 tablet 11     tamoxifen (NOLVADEX) 10 MG tablet Cut each pill in 1/2 with pill cutter to ensure 5 mg daily dose. 45  tablet 3     traZODone (DESYREL) 50 MG tablet TAKE 1 TABLET  BY MOUTH EVERY NIGHT AS NEEDED FOR SLEEP 180 tablet PRN         Allergies:        Allergies   Allergen Reactions     Paclitaxel Anaphylaxis and Difficulty breathing     Wasps [Hornets] Anaphylaxis     Yellow Hornet Venom [Hornet Venom] Anaphylaxis and Swelling     Yellow Jacket Venom [Venomil Honey Bee] Anaphylaxis and Swelling     Sulfa Drugs Hives and Rash        Social History:     Social History     Tobacco Use     Smoking status: Never     Smokeless tobacco: Never   Substance Use Topics     Alcohol use: Yes     Comment: Very infrequent, a bit of wine or beer 2x per month maybe       History   Drug Use     Types: Marijuana     Comment: infrequent, for celebrations only, maybe 8x per year         Family History:     The patient's family history is notable for:     Family History   Problem Relation Age of Onset     C.A.D. Mother      Hypertension Mother      Breast Cancer Mother      Psychotic Disorder Mother      Colon Cancer Mother      Cancer Mother         Bone cancer     Coronary Artery Disease Mother      Hyperlipidemia Mother         treated w. statins     Other Cancer Mother         bone/marrow, ,  of this     Depression Mother      Anxiety Disorder Mother      Mental Illness Mother         various undiagnosed types, but THERE     Obesity Mother      Bone Cancer Mother      Other - See Comments Mother         psychotic disease      C.A.D. Father      Diabetes Father      Hypertension Father      Cerebrovascular Disease Father         1984 or      Psychotic Disorder Father      Pancreatic Cancer Father      Coronary Artery Disease Father      Hyperlipidemia Father         treated w statins     Other Cancer Father         pancreatic, ,  of this     Depression Father      Mental Illness Father         likely PTSD and depression     Obesity Father      Other - See Comments Father         cerebrovascular disease, psychotic  disease      Psychotic Disorder Sister         x2     Neurologic Disorder Sister      Hypertension Sister      Hyperlipidemia Sister         treated w statins     Depression Sister      Anxiety Disorder Sister      Obesity Sister      Rashes/Skin Problems Sister         precancerous lesion on leg     Psychotic Disorder Sister      Other - See Comments Sister         small kidney stone      Hypertension Sister      Hyperlipidemia Sister         treated w statins     Depression Sister      Anxiety Disorder Sister      Other - See Comments Sister         psychotic disease      Prostate Problems Brother         cleared      Hypertension Brother      Hyperlipidemia Brother         unsure if treated w statins     Depression Brother      Anxiety Disorder Brother      Mental Illness Brother         undiagnosed but THERE     Obesity Brother      Pacemaker Brother 70        bradycardia, sick sinus syndrome     Psychotic Disorder Brother         x2     Depression Brother         major depressive disorder and OCD     Anxiety Disorder Brother      Mental Illness Brother         not sure of diagnoses, treated     Hypertension Brother      Hyperlipidemia Brother      Prostate Cancer Brother      Depression Brother      Anxiety Disorder Brother      Other - See Comments Brother         psychotic disease      Psychotic Disorder Maternal Grandmother         ?     Coronary Artery Disease Maternal Grandmother          of heart attack age 84?     Depression Maternal Grandmother      Psychotic Disorder Maternal Grandfather         ?     Psychotic Disorder Paternal Grandmother         Schizophernia     Coronary Artery Disease Paternal Grandmother          of heart attack, age 85?     Depression Paternal Grandmother         severe, but recovered     Mental Illness Paternal Grandmother         possible bipolar or other     Schizophrenia Paternal Grandmother      Psychotic Disorder Paternal Grandfather         ?     Respiratory  Paternal Grandfather          of emphysema and smoking     Emphysema Paternal Grandfather      Cancer - colorectal No family hx of      Glaucoma No family hx of      Macular Degeneration No family hx of          Physical Exam:     /74   Pulse 90   Temp 98.2  F (36.8  C) (Oral)   Resp 16   Wt 95.8 kg (211 lb 4.8 oz)   LMP 2009   SpO2 96%   BMI 37.43 kg/m    Body mass index is 37.43 kg/m .    General Appearance: healthy and alert, no distress     HEENT: no thyromegaly, no palpable nodules or masses       Cardiovascular: regular rate and rhythm, no gallops, rubs or murmurs     Respiratory: lungs clear, no rales, rhonchi or wheezes    Musculoskeletal: extremities non tender without edema    Skin:  no lesions or rashes; lower left back with dressing applied     Neurological: normal gait, no gross defects     Psychiatric: appropriate mood and affect                               Hematological: normal cervical, supraclavicular and inguinal lymph nodes     Gastrointestinal:       abdomen soft, non-tender, non-distended, no organomegaly or masses    Genitourinary: External genitalia and urethral meatus appears normal. Vagina is smooth without nodularity or masses. Cervix surgically absent.  Bimanual exam reveal no masses, nodularity or fullness. Recto-vaginal exam confirms these findings.       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 She completed treatment with surgery and three cycles of chemotherapy on 10/16/17. She is here today for follow up and surveillance.    25 minutes spent on the date of the encounter doing chart review, history and exam, documentation, and further activities as noted above.          Plan:     1.)         ASHISH on exam. Start annual visits as pt is 5 years out from treatment  with  and pelvic/rectal exam. Reviewed signs and symptoms for when she should contact the clinic or seek additional care.  Patient to contact the clinic with any questions or concerns in the interim.     2.) Genetic testing: Hafsa is negative for mutations in the AIP, ALK, APC, COSTA, BAP1, BARD1, BLM, BRCA1, BRCA2, BRIP1, BMPR1A, CDH1, CDK4, CDKN1B, CDKN2A, CHEK2, DICER1, EPCAM, FANCC, FH, FLCN, GALNT12, GREM1, HOXB13, MAX, MEN1, MET, MITF, MLH1, MRE11A, MSH2, MSH6, MUTYH, NBN, NF1, NF2, PALB2, PHOX2B, PMS2, POLD1, POLE, POT1, JTKZK9B, PTCH1, PTEN, RAD50, RAD51C, RAD51D, RB1, RET, SDHA, SDHAF2, SDHB, SDHC, SDHD, SMAD4, SMARCA4, SMARCB1, SMARCE1, STK11, SUFU, EIOD430, TP53, TSC1, TSC2, VHL, and XRCC2 genes. No mutations were found in any of the 67 genes analyzed. High risk breast screening. Sees [facility]. On low dose tamoxifen since 10/10/2019 for prevention of breast cancer.     3.) Labs and/or tests ordered include:   (reviewed)     4.) Health maintenance issues addressed today include annual health maintenance and non-gynecologic issues with PCP. Encouraged DEXA scan. Encouraged anemia lab workup, thyroid, and Vit D lab testing with PCP for ongoing fatigue.     5.)        Lung nodules: stable for two years on CT considered statistically benign and no further imaging needed unless new symptoms.     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 Mercado with Urology. Urinary incontinence: stress and urge. Considering surgery.           MARLON Bradshaw, NP-BC  Women's Health Nurse Practitioner  Division of Gynecologic Oncology  [facility]          CC  Patient Care Team:  Morelia Ayon NP as PCP - Karla Perez RN as Continuity Care Coordinator (Gynecologic Oncology)  Nabeel Dick MD as MD (Urology)  Javier Gregorio MD as MD (Urology)  Lola Vasquez, PhD LP as Psychologist (Psychology)  Morelia Ayon NP as Assigned PCP  Leland Mercado MD as MD (Urology)  Forrest, Dayanna, RN as Registered Nurse (Oncology)  Sha,  MARLON Pepe CNP as Assigned Cancer Care Provider  Norris Vela MD as MD (Critical Care)  Morelia Ayon NP as Referring Physician (Nurse Practitioner - Family)  Andreia Rosales PA-C as Physician Assistant (Dermatology)  Norris Vela MD as Assigned Surgical Provider

## 2022-10-27 ENCOUNTER — ONCOLOGY VISIT (OUTPATIENT)
Dept: ONCOLOGY | Facility: CLINIC | Age: 67
End: 2022-10-27
Attending: OBSTETRICS & GYNECOLOGY
Payer: COMMERCIAL

## 2022-10-27 ENCOUNTER — ANCILLARY PROCEDURE (OUTPATIENT)
Dept: MAMMOGRAPHY | Facility: CLINIC | Age: 67
End: 2022-10-27
Attending: CLINICAL NURSE SPECIALIST
Payer: COMMERCIAL

## 2022-10-27 VITALS
DIASTOLIC BLOOD PRESSURE: 74 MMHG | WEIGHT: 211.3 LBS | RESPIRATION RATE: 16 BRPM | SYSTOLIC BLOOD PRESSURE: 109 MMHG | HEART RATE: 90 BPM | TEMPERATURE: 98.2 F | BODY MASS INDEX: 37.43 KG/M2 | OXYGEN SATURATION: 96 %

## 2022-10-27 DIAGNOSIS — C56.2 OVARIAN CANCER, LEFT (H): Primary | ICD-10-CM

## 2022-10-27 DIAGNOSIS — R92.8 ABNORMAL MAMMOGRAM OF LEFT BREAST: ICD-10-CM

## 2022-10-27 PROCEDURE — 0124A HC ADMIN COVID VAC PFIZER 12+ BIVAL ADDITIONAL: CPT | Performed by: OBSTETRICS & GYNECOLOGY

## 2022-10-27 PROCEDURE — 76642 ULTRASOUND BREAST LIMITED: CPT | Mod: LT | Performed by: RADIOLOGY

## 2022-10-27 PROCEDURE — G0463 HOSPITAL OUTPT CLINIC VISIT: HCPCS | Mod: 25

## 2022-10-27 PROCEDURE — 250N000011 HC RX IP 250 OP 636: Performed by: OBSTETRICS & GYNECOLOGY

## 2022-10-27 PROCEDURE — 91312 HC RX IP 250 OP 636: CPT | Performed by: OBSTETRICS & GYNECOLOGY

## 2022-10-27 PROCEDURE — G0279 TOMOSYNTHESIS, MAMMO: HCPCS | Mod: LT | Performed by: RADIOLOGY

## 2022-10-27 PROCEDURE — 77065 DX MAMMO INCL CAD UNI: CPT | Mod: LT | Performed by: RADIOLOGY

## 2022-10-27 PROCEDURE — 99213 OFFICE O/P EST LOW 20 MIN: CPT | Performed by: OBSTETRICS & GYNECOLOGY

## 2022-10-27 RX ADMIN — BNT162B2 ORIGINAL AND OMICRON BA.4/BA.5 30 MCG: .1125; .1125 INJECTION, SUSPENSION INTRAMUSCULAR at 15:33

## 2022-10-27 NOTE — NURSING NOTE
"Oncology Rooming Note    October 27, 2022 2:44 PM   Hafsa Corona is a 67 year old female who presents for:    Chief Complaint   Patient presents with     Oncology Clinic Visit     RTN for Ovarian Cancer     Initial Vitals: Blood Pressure 109/74   Pulse 90   Temperature 98.2  F (36.8  C) (Oral)   Respiration 16   Weight 95.8 kg (211 lb 4.8 oz)   Last Menstrual Period 01/01/2009   Oxygen Saturation 96%   Body Mass Index 37.43 kg/m   Estimated body mass index is 37.43 kg/m  as calculated from the following:    Height as of 8/17/22: 1.6 m (5' 3\").    Weight as of this encounter: 95.8 kg (211 lb 4.8 oz). Body surface area is 2.06 meters squared.  Data Unavailable Comment: Data Unavailable   Patient's last menstrual period was 01/01/2009.  Allergies reviewed: Yes  Medications reviewed: Yes    Medications: Medication refills not needed today.  Pharmacy name entered into [facility]:    [id number] - [place], [place] - [address] AT [facility] [address]  [id number] [place] - [address] [facility] [address]  [facility] [place], [place] [address] [facility] [phone]  [facility] [place], [place] - [address]  [facility] #[id number] - [place], [place] - [address] AT [facility] OF [address]    Clinical concerns: NONE       Winifred Correa MA            "

## 2022-10-27 NOTE — LETTER
10/27/2022         RE: Hafsa Corona  [address]  [place] [address]        Dear Colleague,    Thank you for referring your patient, Hafsa Corona, to the [facility]. Please see a copy of my visit note below.                    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 She completed treatment with surgery and three cycles of chemotherapy on 10/16/17. She is here today for follow up and surveillance.    Oncology History:  The patient has had a recent episode of lower abdominal pain in early July.  She was subsequently sent to the emergency room and was found to have a large 9 cm complex ovarian mass. She was admitted to the hospital for pain control.  7/3/17:  1187  17: Exploratory laparotomy, total abdominal hysterectomy, left salpingo-oophorectomy, cancer staging including infracolic omentectomy, bilateral pelvic & para-aortic lymphadenectomy, peritoneal biopsies, evacuation of pelvic fluid by Dr. Cole.    FINAL DIAGNOSIS:   A. LEFT OVARY AND FALLOPIAN TUBE, LEFT SALPINGO-OOPHORECTOMY:   - Ovarian clear cell carcinoma   - Background of endometriosis   - Fallopian tube with no significant histologic abnormality.   B. UTERUS, HYSTERECTOMY:   -  Inactive endometrium   -  Myometrium with adenomyosis and leiomyoma.   -  Cervix with squamous metaplasia.   C. PERITONEUM, RIGHT PELVIS, BIOPSY:   - Fibroadipose tissue, negative for malignancy.   D. LYMPH NODES, RIGHT PELVIC, DISSECTION:   - Thirteen benign lymph nodes (0/13).   E. LYMPH NODES, LEFT PELVIC, DISSECTION:   - Seven benign lymph nodes (0/7).   F. PERITONEUM, LEFT PELVIS, BIOPSY:   - Fibroadipose tissue, negative for malignancy.   G. PERITONEUM, BLADDER, BIOPSY:   - Fibroadipose tissue  with acute and chronic inflammation,  negative for malignancy.   H. PERITONEUM, POSTERIOR CUL-DE-SAC, BIOPSY:   - Fibroadipose tissue, negative for malignancy.   I. PERITONEUM, LEFT PARACOLIC GUTTER, BIOPSY:   - Fibroadipose tissue, negative for malignancy.   J. LYMPH NODES, LEFT PARA-AORTIC, DISSECTION:   - Five benign lymph nodes (0/5).   K. LYMPH NODES, RIGHT PARA-AORTIC, DISSECTION:   - Two benign lymph nodes (0/2).   L. PERITONEUM, RIGHT PARACOLIC GUTTER, BIOPSY:   - Fibroadipose tissue, negative for malignancy.   M. OMENTUM, OMENTECTOMY:   - Adipose tissue with reactive changes, negative for malignancy.   COMMENT:   The final diagnosis confirms the interpretation provided intraoperatively.   Report Name: Ovary or Fallopian Tube   Status: Submitted   Part(s) Involved:   A: Ovary and fallopian tube, left   Synoptic Report:   CLINICAL   Clinical History:   - Pelvic mass   SPECIMEN   Procedure:   - Left salpingo-oophorectomy   - Hysterectomy   - Omentectomy   - Peritoneal  biopsies   Lymph Node Sampling:   - Performed   Location:   - Pelvic lymph nodes   - Para-aortic lymph nodes   Specimen Integrity:   - Left ovary   Specimen Integrity of Left Ovary:   - Capsule intact   TUMOR   Primary Tumor Site(s):   - Left ovary   Left Ovary   Tumor Size of Left Ovary: 19 cm   Histologic Type:   - Clear cell carcinoma   Tumor Extent   Ovarian Surface Involvement:   - Absent   Specimen(s)   Extent of Left Ovary:   - Involved   Extent of Left Fallopian Tube:   - Not involved   Extent of Omentum:   - Not involved   Extent of Uterus:   - Not involved   Extent of Peritoneum:   - Not involved   Peritoneal Ascitic Fluid:   - Not performed / unknown   Pleural Fluid:   - Not performed / unknown   LYMPH NODES     Number of Pelvic Lymph Nodes Examined: 20     Number of Pelvic Lymph Nodes Involved: None identified     Number of Para-aortic Lymph Nodes Examined: 7     Number of Para-Aortic Lymph Nodes Involved: None identified   STAGE (PTNM, AJCC 7TH ED.)   Primary Tumor  (pT):   - Ovary   Ovarian Primary Tumor (pT):   - pT1a: Tumor limited to 1 ovary; capsule intact, no tumor on ovarian surface. No malignant cells in ascites or peritoneal washings#   Regional Lymph Nodes (pN):   - pN0: No regional lymph node metastasis       07/31/17: Dr Shaffer follow up: Discussed with the patient that given the high risk histology, as well as ruptured mass before surgery, she would be qualified at higher risk of recurrence despite early stage ovarian cancer.  Recommended adjuvant chemotherapy. Plan for carboplatin of AUC of 6 and paclitaxel of 175, m2 for 3 cycles. Referral for genetic counselor and will see her back after those 3 cycles  08/03/17: Call from patient stating she is going for a second opinion and would like chemotherapy rescheduled to week of 8/14/17.      08/16/17: Cycle #1 Carbo/Taxol.  73. Significant paclitaxel reaction.  08/30/17: C1 carboplatin/inpatient paclitaxel desensitization.   09/25/17: C2 carboplatin/paclitaxel- inpatient paclitaxel desensitization.  15.  10/16/17: C3 carboplatin/paclitaxel- switched to docetaxel given her neuropathy.  10.  11/14/17:  8. CT CAP:  IMPRESSION:   1. Post surgical changes of hysterectomy and bilateral  salpingo-oophorectomy. Nodular soft tissue density in the posterior  cul-de-sac along with ill-defined nodular thickening in the left  pelvis, suspicious for residual disease or metastatic deposits.   2. There is a 3 cm mass in the superior pole of the right kidney,  possibly extending into the renal hilum. Represents RCC until proven  otherwise. Consider renal mass protocol CT to assess renal vein  involvement.  3. Stable sub-4 mm pulmonary nodules, continued attention on  follow-up.     11/16/17: CT renal mass protocol  IMPRESSION:  1. Arterially enhancing mass measuring 3.6 x 2.1 x 2.2 cm mass arising  from superior posterior of the right kidney, this is renal cell  carcinoma until proven otherwise.  2. Stable  fat-containing umbilical hernia.     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 Margins negative. Three month surveillance plan with [facility].     2/26/18:  14     5/22/18:  11      8/23/18:  11     12/12/18:  10     3/8/19:  12     6/19/19:  13     9/23/19:  12    11/8/19: CT Chest:   IMPRESSION:   1. Stable, sub-4 mm pulmonary nodules as above.  2. Stable hypodense, subcentimeter nodules in the thyroid.     3/27/20:  14    10/1/20:  15    3/29/21:  10    7/18/21 CT A/P for abdominal pain in ER: IMPRESSION:   1.  No acute process in the abdomen or pelvis.  2.  Colonic diverticulosis without evidence for diverticulitis.  3.  Post right nephrectomy, hysterectomy, and oophorectomy    10/5/2021:  13  4/1/22:  13  10/21/22:  15            Today Hafsa comes to the clinic without any gynecological concerns. Pt is meeting with urologist in November for urinary incontinence. Tried pelvic floor PT without much improvement.  She is sexually active with her boyfriend without pain or bleeding. She is on Xarelto daily for hx of DVT. She denies any vaginal bleeding, no changes in her bowel or bladder habits, no nausea/emesis, and no difficulties eating. She denies any abdominal discomfort/bloating, no fevers or chills, and no chest pain or shortness of breath. She up to date on breast screening (high risk screening through [facility]). She is up to date on her colonoscopy (1/6/20) and mammogram. Unfortunately Hafsa lost her job and is currently looking for a new one.        ROS: 10 point ROS neg other than the symptoms noted above in the HPI.      Past Medical History:    Past Medical History:   Diagnosis Date     Anxiety state, unspecified     0116-1568     Arthritis 2014    vague, gradual, not treated really, knees feel it     At high risk for breast cancer 09/27/2019    30.3% lifetime risk by SHERON model      Attention deficit disorder without mention of hyperactivity      Cancer (H) 7/2017 and 1/2018    ovarian and kidney cancers, both treated well     Chronic rhinitis      Depressive disorder lifetime    plus Anxiety plus ADD. Escitalipram, therapy, ADD meds maybe     Heart disease 1999    tachycardia and high cholesterol, managed     History of blood transfusion 07/2017    while in hospital for ovarian cancer     Hypertension 1999    tho BP was too LOW last week. past 12 years Generally OK     Panic disorder without agoraphobia      Pure hypercholesterolemia     Lipitor     Tachycardia, unspecified     9/1999-2/2004     Type II or unspecified type diabetes mellitus without mention of complication, not stated as uncontrolled     diagnosed 2004         Past Surgical History:    Past Surgical History:   Procedure Laterality Date     ABSCESS DRAINAGE Left     left leg      AS REMV KIDNEY,RADICAL Right      BIOPSY  7/2017 and 2/2018    ovarian and kidney cancer biopsies. also 1987 breast benign     BREAST CYST EXCISION  1985     BREAST SURGERY  1987    date unsure. benign breast cyst removed     CATARACT IOL, RT/LT Left 05/18/2018     CATARACT IOL, RT/LT Right 07/1318     COLONOSCOPY  2008 and 2013    polyps removed. Next due fall 2018     ENDOMETRIAL ABLATION  2008     EXCISE MASS BACK N/A 12/6/2021    Procedure: EXCISION, MASS, BACK;  Surgeon: Norris Vela MD;  Location: [facility] OR     HYSTERECTOMY TOTAL ABDOMINAL, BILATERAL SALPINGO-OOPHORECTOMY, NODE DISSECTION, COMBINED Left 7/7/2017    Procedure: COMBINED HYSTERECTOMY TOTAL ABDOMINAL, SALPINGO-OOPHORECTOMY, NODE DISSECTION;  Exploratory Laparotomy, Left Salpingo-Oophorectomy, Cancer Staging, Total Hysterectomy, Omentectomy, Evacuation of abdominal fluid, Lymph Node Dissection  Anesthesia Block ;  Surgeon: Serena Cole MD;  [facility]: [facility]     HYSTERECTOMY, PAP NO LONGER INDICATED  07/07/2017    Laparotomy; for ovarian cancer staging     HYSTERECTOMY,  PAP NO LONGER INDICATED       INSERT PORT VASCULAR ACCESS Right 8/21/2017    Procedure: INSERT PORT VASCULAR ACCESS;  Single Lumen Chest Power Port;  Surgeon: Stephen Mike PA-C;  [facility]     IR PORT REMOVAL RIGHT  11/12/2021     LYMPHADENECTOMY RETROPERITONEAL Bilateral 07/07/2017    Laparotomy; pelvics & para-aortics; for ovarian cancer staging     OMENTECTOMY  07/07/2017    Laparotomy; for ovarian cancer staging     ORTHOPEDIC SURGERY  1/2002    broken left jaw due to fall, 4 fractures, titanium plates     OTHER SURGICAL HISTORY  01/2002    OTHER SURGICAL HISTORYfacial surgery due to fall      PHACOEMULSIFICATION CLEAR CORNEA WITH STANDARD INTRAOCULAR LENS IMPLANT Right 7/13/2018    Procedure: PHACOEMULSIFICATION CLEAR CORNEA WITH STANDARD INTRAOCULAR LENS IMPLANT;  RIght Eye Phacoemulsification Clear Cornea with Standard Intraocular Lens Placement ;  Surgeon: Mary Jo Massey MD;  [facility]     PHACOEMULSIFICATION CLEAR CORNEA WITH TORIC INTRAOCULAR LENS IMPLANT Left 5/18/2018    Procedure: PHACOEMULSIFICATION CLEAR CORNEA WITH TORIC INTRAOCULAR LENS IMPLANT;  Left Eye Phacoemulsification with Toric Lens;  Surgeon: Mary Jo Massey MD;  [facility]     REMOVE PORT VASCULAR ACCESS Right 11/12/2021    Procedure: REMOVAL, VASCULAR ACCESS PORT right;  Surgeon: Afsaneh Das PA-C;  Location: [facility] OR     SALPINGO OOPHORECTOMY,R/L/KAYY Right 2008    Salpingo Oophorectomy, RT     SALPINGO OOPHORECTOMY,R/L/KAYY Left 07/07/2017    Laparotomy; for ovarian cancer staging     SALPINGOOPHORECTOMY  2008     SURGICAL HISTORY OF -       facial surgery d/t fall in 1/2002     SURGICAL HISTORY OF -       1985 removal of breast cyst     SURGICAL HISTORY OF -   2008    endometrial ablation     YAG CAPSULOTOMY OD (RIGHT EYE)  10/22/2018         Health Maintenance Due   Topic Date Due     ZOSTER IMMUNIZATION (1 of 2) Never done     EYE EXAM  11/28/2019     MEDICARE ANNUAL WELLNESS VISIT  12/01/2021      COVID-19 Vaccine (4 - Booster for Pfizer series) 01/17/2022       Current Medications:     Current Outpatient Medications   Medication Sig Dispense Refill     Acetaminophen (TYLENOL PO) Take 500 mg by mouth 2 tabs prn pain (monthly)       ASPIRIN NOT PRESCRIBED (INTENTIONAL) Please choose reason not prescribed from choices below.       atorvastatin (LIPITOR) 80 MG tablet TAKE 1 TABLET(80 MG) BY MOUTH DAILY 90 tablet 3     B Complex Vitamins (VITAMIN B-COMPLEX PO) Take 50 mg by mouth        CALCIUM-MAGNESIUM-VITAMIN D PO Take 2 tablets by mouth daily       cephALEXin (KEFLEX) 500 MG capsule Take 1 capsule (500 mg) by mouth 4 times daily 20 capsule 0     Cholecalciferol (VITAMIN D) 1000 UNIT capsule Take 2,000 Units by mouth daily        DiphenhydrAMINE HCl (BENADRYL PO) Take 50 mg by mouth daily as needed (monthly)       EPINEPHrine (ANY BX GENERIC EQUIV) 0.3 MG/0.3ML injection 2-pack Inject 0.3 mLs (0.3 mg) into the muscle once as needed for anaphylaxis 0.3 mL PRN     escitalopram (LEXAPRO) 20 MG tablet Take 1 tablet (20 mg) by mouth daily 90 tablet 1     fluticasone (FLONASE) 50 MCG/ACT nasal spray SHAKE LIQUID AND USE 2 SPRAYS IN EACH NOSTRIL DAILY 48 g 3     LORazepam (ATIVAN) 1 MG tablet Take 1 tablet (1 mg) by mouth every 6 hours as needed (nausea/vomiting, anxiety or sleep) 30 tablet 0     Magnesium Hydroxide (MAGNESIA PO) Take 500 mg by mouth       metFORMIN (GLUCOPHAGE XR) 500 MG 24 hr tablet TAKE 2 TABLETS BY MOUTH EVERY DAY WITH THE EVENING MEAL 180 tablet 3     Multiple Vitamins-Minerals (MULTIVITAMIN ADULT PO) Take 1 tablet by mouth daily       nitroFURantoin macrocrystal-monohydrate (MACROBID) 100 MG capsule TAKE 1 CAPSULE BY MOUTH AFTER INTERCOURSE AS NEEDED 30 capsule PRN     nystatin (MYCOSTATIN) 142447 UNIT/GM external powder Apply topically 2 times daily as needed Apply to folds under breast and abdomen. 60 g 1     Probiotic Product (PRO-BIOTIC BLEND PO) Take 1 capsule by mouth daily Enzymatic  Therapy-probiotic pearls       rivaroxaban ANTICOAGULANT (XARELTO) 20 MG TABS tablet Take 1 tablet (20 mg) by mouth daily (with dinner) 30 tablet 11     tamoxifen (NOLVADEX) 10 MG tablet Cut each pill in 1/2 with pill cutter to ensure 5 mg daily dose. 45 tablet 3     traZODone (DESYREL) 50 MG tablet TAKE 1 TABLET  BY MOUTH EVERY NIGHT AS NEEDED FOR SLEEP 180 tablet PRN         Allergies:        Allergies   Allergen Reactions     Paclitaxel Anaphylaxis and Difficulty breathing     Wasps [Hornets] Anaphylaxis     Yellow Hornet Venom [Hornet Venom] Anaphylaxis and Swelling     Yellow Jacket Venom [Venomil Honey Bee] Anaphylaxis and Swelling     Sulfa Drugs Hives and Rash        Social History:     Social History     Tobacco Use     Smoking status: Never     Smokeless tobacco: Never   Substance Use Topics     Alcohol use: Yes     Comment: Very infrequent, a bit of wine or beer 2x per month maybe       History   Drug Use     Types: Marijuana     Comment: infrequent, for celebrations only, maybe 8x per year         Family History:     The patient's family history is notable for:     Family History   Problem Relation Age of Onset     C.A.D. Mother      Hypertension Mother      Breast Cancer Mother      Psychotic Disorder Mother      Colon Cancer Mother      Cancer Mother         Bone cancer     Coronary Artery Disease Mother      Hyperlipidemia Mother         treated w. statins     Other Cancer Mother         bone/marrow, ,  of this     Depression Mother      Anxiety Disorder Mother      Mental Illness Mother         various undiagnosed types, but THERE     Obesity Mother      Bone Cancer Mother      Other - See Comments Mother         psychotic disease      C.A.D. Father      Diabetes Father      Hypertension Father      Cerebrovascular Disease Father         1984 or      Psychotic Disorder Father      Pancreatic Cancer Father      Coronary Artery Disease Father      Hyperlipidemia Father         treated w  statins     Other Cancer Father         pancreatic, ,  of this     Depression Father      Mental Illness Father         likely PTSD and depression     Obesity Father      Other - See Comments Father         cerebrovascular disease, psychotic disease      Psychotic Disorder Sister         x2     Neurologic Disorder Sister      Hypertension Sister      Hyperlipidemia Sister         treated w statins     Depression Sister      Anxiety Disorder Sister      Obesity Sister      Rashes/Skin Problems Sister         precancerous lesion on leg     Psychotic Disorder Sister      Other - See Comments Sister         small kidney stone      Hypertension Sister      Hyperlipidemia Sister         treated w statins     Depression Sister      Anxiety Disorder Sister      Other - See Comments Sister         psychotic disease      Prostate Problems Brother         cleared      Hypertension Brother      Hyperlipidemia Brother         unsure if treated w statins     Depression Brother      Anxiety Disorder Brother      Mental Illness Brother         undiagnosed but THERE     Obesity Brother      Pacemaker Brother 70        bradycardia, sick sinus syndrome     Psychotic Disorder Brother         x2     Depression Brother         major depressive disorder and OCD     Anxiety Disorder Brother      Mental Illness Brother         not sure of diagnoses, treated     Hypertension Brother      Hyperlipidemia Brother      Prostate Cancer Brother      Depression Brother      Anxiety Disorder Brother      Other - See Comments Brother         psychotic disease      Psychotic Disorder Maternal Grandmother         ?     Coronary Artery Disease Maternal Grandmother          of heart attack age 84?     Depression Maternal Grandmother      Psychotic Disorder Maternal Grandfather         ?     Psychotic Disorder Paternal Grandmother         Schizophernia     Coronary Artery Disease Paternal Grandmother          of heart attack, age 85?      Depression Paternal Grandmother         severe, but recovered     Mental Illness Paternal Grandmother         possible bipolar or other     Schizophrenia Paternal Grandmother      Psychotic Disorder Paternal Grandfather         ?     Respiratory Paternal Grandfather          of emphysema and smoking     Emphysema Paternal Grandfather      Cancer - colorectal No family hx of      Glaucoma No family hx of      Macular Degeneration No family hx of          Physical Exam:     /74   Pulse 90   Temp 98.2  F (36.8  C) (Oral)   Resp 16   Wt 95.8 kg (211 lb 4.8 oz)   LMP 2009   SpO2 96%   BMI 37.43 kg/m    Body mass index is 37.43 kg/m .    General Appearance: healthy and alert, no distress     HEENT: no thyromegaly, no palpable nodules or masses       Cardiovascular: regular rate and rhythm, no gallops, rubs or murmurs     Respiratory: lungs clear, no rales, rhonchi or wheezes    Musculoskeletal: extremities non tender without edema    Skin:  no lesions or rashes; lower left back with dressing applied     Neurological: normal gait, no gross defects     Psychiatric: appropriate mood and affect                               Hematological: normal cervical, supraclavicular and inguinal lymph nodes     Gastrointestinal:       abdomen soft, non-tender, non-distended, no organomegaly or masses    Genitourinary: External genitalia and urethral meatus appears normal. Vagina is smooth without nodularity or masses. Cervix surgically absent.  Bimanual exam reveal no masses, nodularity or fullness. Recto-vaginal exam confirms these findings.       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 She completed treatment with surgery and three cycles of chemotherapy on 10/16/17. She is here today for follow up and surveillance.    25 minutes spent on the date of the encounter doing chart review, history and exam, documentation, and further  activities as noted above.          Plan:     1.)         ASHISH on exam. Start annual visits as pt is 5 years out from treatment  with  and pelvic/rectal exam. Reviewed signs and symptoms for when she should contact the clinic or seek additional care. Patient to contact the clinic with any questions or concerns in the interim.     2.) Genetic testing: Hafsa is negative for mutations in the AIP, ALK, APC, COSTA, BAP1, BARD1, BLM, BRCA1, BRCA2, BRIP1, BMPR1A, CDH1, CDK4, CDKN1B, CDKN2A, CHEK2, DICER1, EPCAM, FANCC, FH, FLCN, GALNT12, GREM1, HOXB13, MAX, MEN1, MET, MITF, MLH1, MRE11A, MSH2, MSH6, MUTYH, NBN, NF1, NF2, PALB2, PHOX2B, PMS2, POLD1, POLE, POT1, FCJSF8C, PTCH1, PTEN, RAD50, RAD51C, RAD51D, RB1, RET, SDHA, SDHAF2, SDHB, SDHC, SDHD, SMAD4, SMARCA4, SMARCB1, SMARCE1, STK11, SUFU, PZUM254, TP53, TSC1, TSC2, VHL, and XRCC2 genes. No mutations were found in any of the 67 genes analyzed. High risk breast screening. Sees [facility]. On low dose tamoxifen since 10/10/2019 for prevention of breast cancer.     3.) Labs and/or tests ordered include:   (reviewed)     4.) Health maintenance issues addressed today include annual health maintenance and non-gynecologic issues with PCP. Encouraged DEXA scan. Encouraged anemia lab workup, thyroid, and Vit D lab testing with PCP for ongoing fatigue.     5.)        Lung nodules: stable for two years on CT considered statistically benign and no further imaging needed unless new symptoms.     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 Mercado with Urology. Urinary incontinence: stress and urge. Considering surgery.           MARLON Bradshaw, NP-BC  Women's Health Nurse Practitioner  Division of Gynecologic Oncology  [facility]          CC  Patient Care Team:  Morelia Ayon NP as PCP - Karla Perez RN as Continuity Care Coordinator (Gynecologic Oncology)  Nabeel Dick MD as MD  (Urology)  Jg, Javier Street MD as MD (Urology)  Lola Vasquez, PhD LP as Psychologist (Psychology)  Morelia Ayon NP as Assigned PCP  Leland Mercado MD as MD (Urology)  Dayanna Clayton, RN as Registered Nurse (Oncology)  Iris Dalton, APRN CNP as Assigned Cancer Care Provider  Norris Vela MD as MD (Critical Care)  Morelia Ayon NP as Referring Physician (Nurse Practitioner - Family)  Andreia Rosales PA-C as Physician Assistant (Dermatology)  Norris Vela MD as Assigned Surgical Provider

## 2022-11-08 ENCOUNTER — TELEPHONE (OUTPATIENT)
Dept: UROLOGY | Facility: CLINIC | Age: 67
End: 2022-11-08

## 2022-11-08 NOTE — TELEPHONE ENCOUNTER
Contacted patient and she will make it to the appointment with Chandrika Byers CNP.  Beverly Holland LPN  Urology Clinic Service   [facility]

## 2022-11-08 NOTE — TELEPHONE ENCOUNTER
[facility]    Phone Message    May a detailed message be left on voicemail: yes     Reason for Call: Patient called after getting reminder for 11/15 appt with Chandrika Byers. She is confused why she was ever scheduled with Chandrika Byers when she usually sees Dr. Mercado. She would prefer to keep seeing Dr. Mercado but needs to get in before the end of the calendar year. Writer sees Dr. Mercado's next available is Feb. 2023. Patient is wondering if she can see Dr. Mercado before the end of the year and if not if it is okay she sees Chandrika Byers on 11/15 so she can get the appt this year yet. Please look into this and advise patient. She would prefer a [employer] message first or can also call her on her cell [phone] to leave a message. Thank you.    Action Taken: Message routed to:  [facility] Urology    Travel Screening: Not Applicable

## 2022-11-10 ENCOUNTER — MYC MEDICAL ADVICE (OUTPATIENT)
Dept: FAMILY MEDICINE | Facility: CLINIC | Age: 67
End: 2022-11-10

## 2022-11-10 DIAGNOSIS — M79.89 LEFT LEG SWELLING: ICD-10-CM

## 2022-11-10 DIAGNOSIS — L03.116 LEFT LEG CELLULITIS: Primary | ICD-10-CM

## 2022-11-11 NOTE — TELEPHONE ENCOUNTER
"Dr. Winter --    Referral pended for thigh high compression stockings. Were these to be ordered bilaterally or just the left leg?     Patient seen by you on 9/14/2022.   Patient states:  \"When I finally called in your RX today to [facility], the  (Janene) saw that the \"Knee High\" indication, which you had crossed out on my printed copy (at my request, since I want \"thigh-high\" stockings). She told me that she cannot fill this RX as thigh-high until you change to this designation in the Compression Stocking Type field in the system (EPIC or whatever it is!). So I kindly ask you to make that change and inform me after you've made it.\"    Writer responded via [employer].    Nevaeh Newton, BSN RN  [facility]    "

## 2022-11-14 ENCOUNTER — TELEPHONE (OUTPATIENT)
Dept: FAMILY MEDICINE | Facility: CLINIC | Age: 67
End: 2022-11-14

## 2022-11-14 ENCOUNTER — ANCILLARY PROCEDURE (OUTPATIENT)
Dept: ULTRASOUND IMAGING | Facility: CLINIC | Age: 67
End: 2022-11-14
Attending: UROLOGY
Payer: COMMERCIAL

## 2022-11-14 ENCOUNTER — LAB (OUTPATIENT)
Dept: LAB | Facility: CLINIC | Age: 67
End: 2022-11-14
Attending: UROLOGY
Payer: COMMERCIAL

## 2022-11-14 DIAGNOSIS — D17.71 ANGIOMYOLIPOMA OF KIDNEY: ICD-10-CM

## 2022-11-14 DIAGNOSIS — Z00.6 RESEARCH STUDY PATIENT: Primary | ICD-10-CM

## 2022-11-14 DIAGNOSIS — D64.9 ANEMIA, UNSPECIFIED TYPE: Primary | ICD-10-CM

## 2022-11-14 DIAGNOSIS — C56.2 OVARIAN CANCER, LEFT (H): ICD-10-CM

## 2022-11-14 DIAGNOSIS — D64.9 ANEMIA, UNSPECIFIED TYPE: ICD-10-CM

## 2022-11-14 LAB
CANCER AG125 SERPL-ACNC: 15 U/ML
HOLD SPECIMEN: NORMAL

## 2022-11-14 PROCEDURE — 86304 IMMUNOASSAY TUMOR CA 125: CPT | Mod: 90 | Performed by: PATHOLOGY

## 2022-11-14 PROCEDURE — 85025 COMPLETE CBC W/AUTO DIFF WBC: CPT | Performed by: PATHOLOGY

## 2022-11-14 PROCEDURE — 99000 SPECIMEN HANDLING OFFICE-LAB: CPT | Performed by: PATHOLOGY

## 2022-11-14 PROCEDURE — 76770 US EXAM ABDO BACK WALL COMP: CPT | Performed by: STUDENT IN AN ORGANIZED HEALTH CARE EDUCATION/TRAINING PROGRAM

## 2022-11-14 PROCEDURE — 36415 COLL VENOUS BLD VENIPUNCTURE: CPT | Performed by: PATHOLOGY

## 2022-11-14 NOTE — TELEPHONE ENCOUNTER
Writer responded via [employer].    TIMUR WoodN, RN-BC  [facility]

## 2022-11-14 NOTE — LETTER
December 16, 2022      Hafsa Corona  [address]  [place] [address]        Dear ,    We are writing to inform you of your test results.        Resulted Orders   US Renal Complete    Narrative    EXAMINATION: US RENAL COMPLETE NON-VASCULAR, 11/14/2022 1:20 PM     COMPARISON: 6/28/2021    HISTORY: Angiomyolipoma.  Per chart, Angiomyolipoma status post right  nephrectomy     TECHNIQUE: The kidneys and bladder were scanned in the standard  fashion with specialized ultrasound transducer(s) using both gray  scale and limited color/spectral Doppler techniques.    FINDINGS:    Right kidney: Surgically absent.    Left kidney: Measures 10.3 cm in length. Parenchyma is of normal  thickness and echogenicity. No focal mass. No hydronephrosis.     Bladder: Decompressed.      Impression    IMPRESSION:  Normal sonographic evaluation of the left kidney.    MINI ONEIL MD         SYSTEM ID:  [id number]     Ms. Corona,   Your renal ultrasound is normal.  We should plan for another renal ultrasound in 2 years.  Thank you, Zachery Mercado        If you have any questions or concerns, please call the clinic at the number listed above.       Sincerely,      Leland Mercado MD

## 2022-11-14 NOTE — TELEPHONE ENCOUNTER
Order/Referral Request    Who is requesting: Patient     Orders being requested: Hemoglobin     Reason service is needed/diagnosis: Has been very low lately, around 10. Patient was seen at [facility] for different issue doing labs and they took extra to test for above named order. Hoping order can be placed so those tests can be ran.    When are orders needed by: ASAP    Has this been discussed with Provider: No    Does patient have a preference on a Group/Provider/Facility? Already done    Does patient have an appointment scheduled?: Yes: Was seen earlier     Where to send orders: N/A    Could we send this information to you in [employer] or would you prefer to receive a phone call?:   No preference   Okay to leave a detailed message?: Yes at Home number on file [phone] (home)

## 2022-11-15 ENCOUNTER — OFFICE VISIT (OUTPATIENT)
Dept: UROLOGY | Facility: CLINIC | Age: 67
End: 2022-11-15
Payer: COMMERCIAL

## 2022-11-15 VITALS
DIASTOLIC BLOOD PRESSURE: 83 MMHG | HEIGHT: 63 IN | HEART RATE: 84 BPM | BODY MASS INDEX: 36.5 KG/M2 | WEIGHT: 206 LBS | SYSTOLIC BLOOD PRESSURE: 123 MMHG

## 2022-11-15 DIAGNOSIS — N39.0 RECURRENT UTI: Primary | ICD-10-CM

## 2022-11-15 DIAGNOSIS — N28.89 RENAL MASS: ICD-10-CM

## 2022-11-15 LAB
ALBUMIN UR-MCNC: NEGATIVE MG/DL
APPEARANCE UR: CLEAR
BASOPHILS # BLD AUTO: 0 10E3/UL (ref 0–0.2)
BASOPHILS NFR BLD AUTO: 1 %
BILIRUB UR QL STRIP: NEGATIVE
COLOR UR AUTO: YELLOW
EOSINOPHIL # BLD AUTO: 0.1 10E3/UL (ref 0–0.7)
EOSINOPHIL NFR BLD AUTO: 1 %
ERYTHROCYTE [DISTWIDTH] IN BLOOD BY AUTOMATED COUNT: 14 % (ref 10–15)
GLUCOSE UR STRIP-MCNC: NEGATIVE MG/DL
HCT VFR BLD AUTO: 37.8 % (ref 35–47)
HGB BLD-MCNC: 11.8 G/DL (ref 11.7–15.7)
HGB UR QL STRIP: NEGATIVE
IMM GRANULOCYTES # BLD: 0 10E3/UL
IMM GRANULOCYTES NFR BLD: 0 %
KETONES UR STRIP-MCNC: NEGATIVE MG/DL
LEUKOCYTE ESTERASE UR QL STRIP: NEGATIVE
LYMPHOCYTES # BLD AUTO: 2.2 10E3/UL (ref 0.8–5.3)
LYMPHOCYTES NFR BLD AUTO: 32 %
MCH RBC QN AUTO: 27.5 PG (ref 26.5–33)
MCHC RBC AUTO-ENTMCNC: 31.2 G/DL (ref 31.5–36.5)
MCV RBC AUTO: 88 FL (ref 78–100)
MONOCYTES # BLD AUTO: 0.4 10E3/UL (ref 0–1.3)
MONOCYTES NFR BLD AUTO: 6 %
MUCOUS THREADS #/AREA URNS LPF: PRESENT /LPF
NEUTROPHILS # BLD AUTO: 4.1 10E3/UL (ref 1.6–8.3)
NEUTROPHILS NFR BLD AUTO: 60 %
NITRATE UR QL: NEGATIVE
NRBC # BLD AUTO: 0 10E3/UL
NRBC BLD AUTO-RTO: 0 /100
PH UR STRIP: 6 [PH] (ref 5–7)
PLATELET # BLD AUTO: 312 10E3/UL (ref 150–450)
RBC # BLD AUTO: 4.29 10E6/UL (ref 3.8–5.2)
RBC URINE: 3 /HPF
SP GR UR STRIP: 1.02 (ref 1–1.03)
SQUAMOUS EPITHELIAL: <1 /HPF
UROBILINOGEN UR STRIP-MCNC: NORMAL MG/DL
WBC # BLD AUTO: 6.8 10E3/UL (ref 4–11)
WBC URINE: <1 /HPF

## 2022-11-15 PROCEDURE — 99213 OFFICE O/P EST LOW 20 MIN: CPT | Mod: 25 | Performed by: NURSE PRACTITIONER

## 2022-11-15 PROCEDURE — 87086 URINE CULTURE/COLONY COUNT: CPT | Mod: 90 | Performed by: PATHOLOGY

## 2022-11-15 PROCEDURE — 99000 SPECIMEN HANDLING OFFICE-LAB: CPT | Performed by: PATHOLOGY

## 2022-11-15 PROCEDURE — 81001 URINALYSIS AUTO W/SCOPE: CPT | Performed by: PATHOLOGY

## 2022-11-15 PROCEDURE — 51798 US URINE CAPACITY MEASURE: CPT | Performed by: NURSE PRACTITIONER

## 2022-11-15 ASSESSMENT — PAIN SCALES - GENERAL: PAINLEVEL: NO PAIN (0)

## 2022-11-15 NOTE — LETTER
11/15/2022       RE: Hafsa Corona  [address]  [place] [address]     Dear Colleague,    Thank you for referring your patient, Hafsa Corona, to the [facility] [place] at [facility]. Please see a copy of my visit note below.         Assessment and Plan:     Assessment: 67 year old female with a history of ovarian cancer, treated in 2017, with incidental finding of right renal mass, s/p right nephrectomy in 2017. Reviewed BETTY obtained yesterday, which is normal. She has been struggling with mixed urinary incontinence (urge and stress), which has responded to PFPT in the past, though she did not enjoy this and does not want to do it again. She really just wants to explore surgery options at this point and we therefore discussed referral to surgeon. She would like this. Also with recurrent UTIs, which she suspects are made worse by her incontinence. We discussed self-care prevention measures, including hydration, hygiene, and PO supplements, including cranberry supplements and probiotics that may help.     Plan:  -Will send UA/UC today to check up on her microhematuria.  -Left BETTY in one year and follow up with me at that time to review.   -Follow up with next-available urogyn provider to complete exam and discuss surgical options for stress incontinence.       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 At that time she was incidentally found to have a 3 cm mass in the superior pole of the right kidney, suspicious for RCC. Underwent right nephrectomy in 01/2018 at [facility]. Cystoscopy completed by Dr. Mercado one year ago for microscopic hematuria and was unremarkable.    Follow up BETTY obtained yesterday of the left kidney  was normal.     Today, she reports issues with mixed incontinence, urge and stress. She wears pads daily. She previously tried PFPT and did make some progress with this, but felt she eventually plateaued. She also did not find the experience pleasant and does not wish to go that route again. She is ultimately interested in surgery for this and is interested in seeing a surgeon to discuss further.     Also with a history of recurrent UTIs, at least 1-2 per year. Cannot use topical estrogen due to ovarian cancer history. Suspects her incontinence contributes to this.          Past Medical History:     Past Medical History:   Diagnosis Date     Anxiety state, unspecified     [id number]     Arthritis 2014    vague, gradual, not treated really, knees feel it     At high risk for breast cancer 09/27/2019    30.3% lifetime risk by SHERON model     Attention deficit disorder without mention of hyperactivity      Cancer (H) 7/2017 and 1/2018    ovarian and kidney cancers, both treated well     Chronic rhinitis      Depressive disorder lifetime    plus Anxiety plus ADD. Escitalipram, therapy, ADD meds maybe     Heart disease 1999    tachycardia and high cholesterol, managed     History of blood transfusion 07/2017    while in hospital for ovarian cancer     Hypertension 1999    tho BP was too LOW last week. past 12 years Generally OK     Panic disorder without agoraphobia      Pure hypercholesterolemia     Lipitor     Tachycardia, unspecified     9/1999-2/2004     Type II or unspecified type diabetes mellitus without mention of complication, not stated as uncontrolled     diagnosed 2004            Past Surgical History:     Past Surgical History:   Procedure Laterality Date     ABSCESS DRAINAGE Left     left leg      AS REMV KIDNEY,RADICAL Right      BIOPSY  7/2017 and 2/2018    ovarian and kidney cancer biopsies. also 1987 breast benign     BREAST CYST EXCISION  1985     BREAST SURGERY  1987    date unsure. benign breast cyst  removed     CATARACT IOL, RT/LT Left 05/18/2018     CATARACT IOL, RT/LT Right 07/1318     COLONOSCOPY  2008 and 2013    polyps removed. Next due fall 2018     ENDOMETRIAL ABLATION  2008     EXCISE MASS BACK N/A 12/6/2021    Procedure: EXCISION, MASS, BACK;  Surgeon: Norris Vela MD;  Location: [facility] OR     HYSTERECTOMY TOTAL ABDOMINAL, BILATERAL SALPINGO-OOPHORECTOMY, NODE DISSECTION, COMBINED Left 7/7/2017    Procedure: COMBINED HYSTERECTOMY TOTAL ABDOMINAL, SALPINGO-OOPHORECTOMY, NODE DISSECTION;  Exploratory Laparotomy, Left Salpingo-Oophorectomy, Cancer Staging, Total Hysterectomy, Omentectomy, Evacuation of abdominal fluid, Lymph Node Dissection  Anesthesia Block ;  Surgeon: Serena Cole MD;  [facility]: [facility]     HYSTERECTOMY, PAP NO LONGER INDICATED  07/07/2017    Laparotomy; for ovarian cancer staging     HYSTERECTOMY, PAP NO LONGER INDICATED       INSERT PORT VASCULAR ACCESS Right 8/21/2017    Procedure: INSERT PORT VASCULAR ACCESS;  Single Lumen Chest Power Port;  Surgeon: Stephen Mike PA-C;  [facility]     IR PORT REMOVAL RIGHT  11/12/2021     LYMPHADENECTOMY RETROPERITONEAL Bilateral 07/07/2017    Laparotomy; pelvics & para-aortics; for ovarian cancer staging     OMENTECTOMY  07/07/2017    Laparotomy; for ovarian cancer staging     ORTHOPEDIC SURGERY  1/2002    broken left jaw due to fall, 4 fractures, titanium plates     OTHER SURGICAL HISTORY  01/2002    OTHER SURGICAL HISTORYfacial surgery due to fall      PHACOEMULSIFICATION CLEAR CORNEA WITH STANDARD INTRAOCULAR LENS IMPLANT Right 7/13/2018    Procedure: PHACOEMULSIFICATION CLEAR CORNEA WITH STANDARD INTRAOCULAR LENS IMPLANT;  RIght Eye Phacoemulsification Clear Cornea with Standard Intraocular Lens Placement ;  Surgeon: Mary Jo Massey MD;  [facility]     PHACOEMULSIFICATION CLEAR CORNEA WITH TORIC INTRAOCULAR LENS IMPLANT Left 5/18/2018    Procedure: PHACOEMULSIFICATION CLEAR CORNEA WITH TORIC INTRAOCULAR LENS  IMPLANT;  Left Eye Phacoemulsification with Toric Lens;  Surgeon: Mary Jo Massey MD;  [facility]     REMOVE PORT VASCULAR ACCESS Right 11/12/2021    Procedure: REMOVAL, VASCULAR ACCESS PORT right;  Surgeon: Afsaneh Das PA-C;  Location: [facility] OR     SALPINGO OOPHORECTOMY,R/L/KAYY Right 2008    Salpingo Oophorectomy, RT     SALPINGO OOPHORECTOMY,R/L/KAYY Left 07/07/2017    Laparotomy; for ovarian cancer staging     SALPINGOOPHORECTOMY  2008     SURGICAL HISTORY OF -       facial surgery d/t fall in 1/2002     SURGICAL HISTORY OF -       1985 removal of breast cyst     SURGICAL HISTORY OF -   2008    endometrial ablation     YAG CAPSULOTOMY OD (RIGHT EYE)  10/22/2018            Medications     Current Outpatient Medications   Medication     Acetaminophen (TYLENOL PO)     ASPIRIN NOT PRESCRIBED (INTENTIONAL)     atorvastatin (LIPITOR) 80 MG tablet     B Complex Vitamins (VITAMIN B-COMPLEX PO)     CALCIUM-MAGNESIUM-VITAMIN D PO     cephALEXin (KEFLEX) 500 MG capsule     Cholecalciferol (VITAMIN D) 1000 UNIT capsule     DiphenhydrAMINE HCl (BENADRYL PO)     EPINEPHrine (ANY BX GENERIC EQUIV) 0.3 MG/0.3ML injection 2-pack     escitalopram (LEXAPRO) 20 MG tablet     fluticasone (FLONASE) 50 MCG/ACT nasal spray     LORazepam (ATIVAN) 1 MG tablet     Magnesium Hydroxide (MAGNESIA PO)     metFORMIN (GLUCOPHAGE XR) 500 MG 24 hr tablet     Multiple Vitamins-Minerals (MULTIVITAMIN ADULT PO)     nitroFURantoin macrocrystal-monohydrate (MACROBID) 100 MG capsule     nystatin (MYCOSTATIN) 374892 UNIT/GM external powder     Probiotic Product (PRO-BIOTIC BLEND PO)     rivaroxaban ANTICOAGULANT (XARELTO) 20 MG TABS tablet     tamoxifen (NOLVADEX) 10 MG tablet     traZODone (DESYREL) 50 MG tablet     Current Facility-Administered Medications   Medication     lidocaine (XYLOCAINE) 2 % external gel            Allergies:   Paclitaxel, Wasps [hornets], Yellow hornet venom [hornet venom], Yellow jacket venom [venomil honey bee], and  "Sulfa drugs         Review of Systems:  From intake questionnaire   Negative 14 system review except as noted on HPI, nurse's note.         Physical Exam:   Patient is a 67 year old  female   Vitals: Blood pressure 123/83, pulse 84, height 1.6 m (5' 3\"), weight 93.4 kg (206 lb), last menstrual period 01/01/2009, not currently breastfeeding.  General Appearance Adult: Alert, no acute distress, oriented  Lungs: no respiratory distress, or pursed lip breathing  Heart: No obvious jugular venous distension present  Abdomen: soft, nontender, no organomegaly or masses, Body mass index is 36.49 kg/m .  : deferred      Labs and Pathology:    I personally reviewed all applicable laboratory data and went over findings with patient  Significant for:    CBC RESULTS:  Recent Labs   Lab Test 11/14/22  1327 08/18/22  1736 11/12/21  0807 07/18/21  2212   WBC 6.8 6.0 6.0 8.5   HGB 11.8 10.4* 11.5* 11.9    366 254 249        BMP RESULTS:  Recent Labs   Lab Test 08/18/22  1736 07/06/22  1125 11/15/21  1806 11/12/21  0813 10/04/21  1653 07/18/21  2212 07/18/21  2212 06/28/21  1437 03/27/20  1510 01/14/20  0327 01/03/20  1001    139  --   --  139  --  142 143 137 143 138   POTASSIUM 3.7 4.1  --   --  3.8  --  3.8 4.4 3.9 3.8 4.1   CHLORIDE 107 107  --   --  107  --  111* 109 105 109 106   CO2 22 23  --   --  25  --  26 28 28 30 24   ANIONGAP 13 9  --   --  7  --  5 6 4 4 8   * 127*  --  134* 138*   < > 127* 96 80 147* 118*   BUN 19.7 28  --   --  27  --  21 25 19 35* 24   CR 1.10* 1.10* 1.3*  --  1.18*   < > 1.19* 1.20* 1.14* 1.30* 1.24*   GFRESTIMATED 54* 54* 43*  --  48*   < > 47* 47* 50* 43* 45*   GFRESTBLACK  --   --   --   --   --   --   --  54* 58* 50* 52*   COLE 9.1 8.7  --   --  8.9  --  9.1 9.1 8.9 9.1 9.5    < > = values in this interval not displayed.       UA RESULTS:   Recent Labs   Lab Test 07/29/22  1932 07/27/21  1808 07/18/21  2336 06/25/21  0840   SG 1.025 >=1.030 1.015 1.018   URINEPH 5.0 5.0 7.0 " 5.0   NITRITE Negative Negative Negative Negative   RBCU 2-5*  --  3* 5*   WBCU 5-10*  --  1 3       CALCIUM RESULTS  Lab Results   Component Value Date    COLE 9.1 08/18/2022    COLE 8.7 07/06/2022    COLE 8.9 10/04/2021    COLE 9.1 06/28/2021    COLE 8.9 03/27/2020    COLE 9.1 01/14/2020       PSA RESULTS  No results found for: PSA    INR  Recent Labs   Lab Test 08/18/22  2340 11/12/21  0807 08/21/17  0810 07/02/17  2255   INR 1.49* 0.93 1.05 1.17*           Imaging:    I personally reviewed all applicable imaging and went over findings with patient.  Significant for:    Results for orders placed or performed in visit on 11/14/22   US Renal Complete    Narrative    EXAMINATION: US RENAL COMPLETE NON-VASCULAR, 11/14/2022 1:20 PM     COMPARISON: 6/28/2021    HISTORY: Angiomyolipoma.  Per chart, Angiomyolipoma status post right  nephrectomy     TECHNIQUE: The kidneys and bladder were scanned in the standard  fashion with specialized ultrasound transducer(s) using both gray  scale and limited color/spectral Doppler techniques.    FINDINGS:    Right kidney: Surgically absent.    Left kidney: Measures 10.3 cm in length. Parenchyma is of normal  thickness and echogenicity. No focal mass. No hydronephrosis.     Bladder: Decompressed.      Impression    IMPRESSION:  Normal sonographic evaluation of the left kidney.    MINI ONEIL MD         SYSTEM ID:  [id number]     *Note: Due to a large number of results and/or encounters for the requested time period, some results have not been displayed. A complete set of results can be found in Results Review.

## 2022-11-15 NOTE — NURSING NOTE
"Chief Complaint   Patient presents with     Follow Up       Blood pressure 123/83, pulse 84, height 1.6 m (5' 3\"), weight 93.4 kg (206 lb), last menstrual period 01/01/2009, not currently breastfeeding. Body mass index is 36.49 kg/m .    Patient Active Problem List   Diagnosis     Attention deficit disorder     PANIC DISORDER      VASOMOTOR RHINITIS     Chronic Maxillary Sinusitis     Tachycardia     Type 2 diabetes mellitus without complication (H)     HYPERLIPIDEMIA LDL GOAL <100     Allergic rhinitis due to other allergen     BMI > 35 with comorbidities     Essential hypertension     Lumbago     Major depressive disorder, recurrent episode, mild (H)     Long-term (current) use of anticoagulants [Z79.01]     S/P laparotomy     Ovarian cancer, left (H)     Encounter for long-term (current) use of medications     Ovarian cancer (H)     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)     Pain in joint, multiple sites     ACP (advance care planning)     Solitary kidney, acquired     Encounter for follow-up surveillance of ovarian cancer     Personal history of DVT (deep vein thrombosis)     Abscess of left lower extremity     ADD (attention deficit disorder) without hyperactivity     Angiomyolipoma of kidney     Anxiety     Coronary atherosclerosis     Depressive disorder     Dyslipidemia     Dysthymic disorder     Generalized anxiety disorder     Heel pain     Lymphedema     Mild anemia     Normocytic anemia     Renal oncocytoma     Panic disorder with agoraphobia     At high risk for breast cancer     Chronic kidney disease, stage 3 (H)     Mass of subcutaneous tissue of back     Infection due to 2019 novel coronavirus       Allergies   Allergen Reactions     Paclitaxel Anaphylaxis and Difficulty breathing     Wasps [Hornets] Anaphylaxis     Yellow Hornet Venom [Hornet Venom] Anaphylaxis and Swelling     Yellow Jacket Venom [Venomil Honey Bee] Anaphylaxis and Swelling     Sulfa Drugs Hives and Rash       Current " Outpatient Medications   Medication Sig Dispense Refill     Acetaminophen (TYLENOL PO) Take 500 mg by mouth 2 tabs prn pain (monthly)       ASPIRIN NOT PRESCRIBED (INTENTIONAL) Please choose reason not prescribed from choices below.       atorvastatin (LIPITOR) 80 MG tablet TAKE 1 TABLET(80 MG) BY MOUTH DAILY 90 tablet 3     B Complex Vitamins (VITAMIN B-COMPLEX PO) Take 50 mg by mouth        CALCIUM-MAGNESIUM-VITAMIN D PO Take 2 tablets by mouth daily       cephALEXin (KEFLEX) 500 MG capsule Take 1 capsule (500 mg) by mouth 4 times daily 20 capsule 0     Cholecalciferol (VITAMIN D) 1000 UNIT capsule Take 2,000 Units by mouth daily        DiphenhydrAMINE HCl (BENADRYL PO) Take 50 mg by mouth daily as needed (monthly)       EPINEPHrine (ANY BX GENERIC EQUIV) 0.3 MG/0.3ML injection 2-pack Inject 0.3 mLs (0.3 mg) into the muscle once as needed for anaphylaxis 0.3 mL PRN     escitalopram (LEXAPRO) 20 MG tablet Take 1 tablet (20 mg) by mouth daily 90 tablet 1     fluticasone (FLONASE) 50 MCG/ACT nasal spray SHAKE LIQUID AND USE 2 SPRAYS IN EACH NOSTRIL DAILY 48 g 3     LORazepam (ATIVAN) 1 MG tablet Take 1 tablet (1 mg) by mouth every 6 hours as needed (nausea/vomiting, anxiety or sleep) 30 tablet 0     Magnesium Hydroxide (MAGNESIA PO) Take 500 mg by mouth       metFORMIN (GLUCOPHAGE XR) 500 MG 24 hr tablet TAKE 2 TABLETS BY MOUTH EVERY DAY WITH THE EVENING MEAL 180 tablet 3     Multiple Vitamins-Minerals (MULTIVITAMIN ADULT PO) Take 1 tablet by mouth daily       nitroFURantoin macrocrystal-monohydrate (MACROBID) 100 MG capsule TAKE 1 CAPSULE BY MOUTH AFTER INTERCOURSE AS NEEDED 30 capsule PRN     nystatin (MYCOSTATIN) 912917 UNIT/GM external powder Apply topically 2 times daily as needed Apply to folds under breast and abdomen. 60 g 1     Probiotic Product (PRO-BIOTIC BLEND PO) Take 1 capsule by mouth daily Enzymatic Therapy-probiotic pearls       rivaroxaban ANTICOAGULANT (XARELTO) 20 MG TABS tablet Take 1 tablet (20  mg) by mouth daily (with dinner) 30 tablet 11     tamoxifen (NOLVADEX) 10 MG tablet Cut each pill in 1/2 with pill cutter to ensure 5 mg daily dose. 45 tablet 3     traZODone (DESYREL) 50 MG tablet TAKE 1 TABLET  BY MOUTH EVERY NIGHT AS NEEDED FOR SLEEP 180 tablet PRN       Social History     Tobacco Use     Smoking status: Never     Smokeless tobacco: Never   Vaping Use     Vaping Use: Never used   Substance Use Topics     Alcohol use: Yes     Comment: Very infrequent, a bit of wine or beer 2x per month maybe     Drug use: Yes     Types: Marijuana     Comment: infrequent, for celebrations only, maybe 8x per year       Jazzy Arguello  11/15/2022  3:02 PM

## 2022-11-15 NOTE — PATIENT INSTRUCTIONS
UROLOGY CLINIC VISIT PATIENT INSTRUCTIONS    Look into the following supplements:   -Cranberry supplement: Ellura  -Probiotics: Lactobacillus acidophilus     -Schedule a visit with Dr. Fregoso or Dr. So to discuss surgical options for your incontinence.   -Will send your urine for analysis and culture today.   -Follow up with me in one year with a renal ultrasound beforehand.     If you have any issues, questions or concerns in the meantime, do not hesitate to contact us at [phone] or via [employer].     Chandrika Byers, CNP  Department of Urology

## 2022-11-15 NOTE — PROGRESS NOTES
Assessment and Plan:     Assessment: 67 year old female with a history of ovarian cancer, treated in 2017, with incidental finding of right renal mass, s/p right nephrectomy in 2017. Reviewed BETTY obtained yesterday, which is normal. She has been struggling with mixed urinary incontinence (urge and stress), which has responded to PFPT in the past, though she did not enjoy this and does not want to do it again. She really just wants to explore surgery options at this point and we therefore discussed referral to surgeon. She would like this. Also with recurrent UTIs, which she suspects are made worse by her incontinence. We discussed self-care prevention measures, including hydration, hygiene, and PO supplements, including cranberry supplements and probiotics that may help.     Plan:  -Will send UA/UC today to check up on her microhematuria.  -Left BETTY in one year and follow up with me at that time to review.   -Follow up with next-available urogyn provider to complete exam and discuss surgical options for stress incontinence.       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 At that time she was incidentally found to have a 3 cm mass in the superior pole of the right kidney, suspicious for RCC. Underwent right nephrectomy in 01/2018 at [facility]. Cystoscopy completed by Dr. Mercado one year ago for microscopic hematuria and was unremarkable.    Follow up BETTY obtained yesterday of the left kidney was normal.     Today, she reports issues with mixed incontinence, urge and stress. She wears pads daily. She previously tried PFPT and did make some progress with this, but felt she eventually plateaued. She also did not find the experience pleasant and does not wish to go that route again. She is ultimately  interested in surgery for this and is interested in seeing a surgeon to discuss further.     Also with a history of recurrent UTIs, at least 1-2 per year. Cannot use topical estrogen due to ovarian cancer history. Suspects her incontinence contributes to this.          Past Medical History:     Past Medical History:   Diagnosis Date     Anxiety state, unspecified     [id number]     Arthritis 2014    vague, gradual, not treated really, knees feel it     At high risk for breast cancer 09/27/2019    30.3% lifetime risk by SHERON model     Attention deficit disorder without mention of hyperactivity      Cancer (H) 7/2017 and 1/2018    ovarian and kidney cancers, both treated well     Chronic rhinitis      Depressive disorder lifetime    plus Anxiety plus ADD. Escitalipram, therapy, ADD meds maybe     Heart disease 1999    tachycardia and high cholesterol, managed     History of blood transfusion 07/2017    while in hospital for ovarian cancer     Hypertension 1999    tho BP was too LOW last week. past 12 years Generally OK     Panic disorder without agoraphobia      Pure hypercholesterolemia     Lipitor     Tachycardia, unspecified     9/1999-2/2004     Type II or unspecified type diabetes mellitus without mention of complication, not stated as uncontrolled     diagnosed 2004            Past Surgical History:     Past Surgical History:   Procedure Laterality Date     ABSCESS DRAINAGE Left     left leg      AS REMV KIDNEY,RADICAL Right      BIOPSY  7/2017 and 2/2018    ovarian and kidney cancer biopsies. also 1987 breast benign     BREAST CYST EXCISION  1985     BREAST SURGERY  1987    date unsure. benign breast cyst removed     CATARACT IOL, RT/LT Left 05/18/2018     CATARACT IOL, RT/LT Right 07/1318     COLONOSCOPY  2008 and 2013    polyps removed. Next due fall 2018     ENDOMETRIAL ABLATION  2008     EXCISE MASS BACK N/A 12/6/2021    Procedure: EXCISION, MASS, BACK;  Surgeon: Norris Vela MD;  Location: [facility] [place] OR      HYSTERECTOMY TOTAL ABDOMINAL, BILATERAL SALPINGO-OOPHORECTOMY, NODE DISSECTION, COMBINED Left 7/7/2017    Procedure: COMBINED HYSTERECTOMY TOTAL ABDOMINAL, SALPINGO-OOPHORECTOMY, NODE DISSECTION;  Exploratory Laparotomy, Left Salpingo-Oophorectomy, Cancer Staging, Total Hysterectomy, Omentectomy, Evacuation of abdominal fluid, Lymph Node Dissection  Anesthesia Block ;  Surgeon: Serena Cole MD;  Location: [facility] OR     HYSTERECTOMY, PAP NO LONGER INDICATED  07/07/2017    Laparotomy; for ovarian cancer staging     HYSTERECTOMY, PAP NO LONGER INDICATED       INSERT PORT VASCULAR ACCESS Right 8/21/2017    Procedure: INSERT PORT VASCULAR ACCESS;  Single Lumen Chest Power Port;  Surgeon: Stephen Mike PA-C;  Location: [facility] OR     IR PORT REMOVAL RIGHT  11/12/2021     LYMPHADENECTOMY RETROPERITONEAL Bilateral 07/07/2017    Laparotomy; pelvics & para-aortics; for ovarian cancer staging     OMENTECTOMY  07/07/2017    Laparotomy; for ovarian cancer staging     ORTHOPEDIC SURGERY  1/2002    broken left jaw due to fall, 4 fractures, titanium plates     OTHER SURGICAL HISTORY  01/2002    OTHER SURGICAL HISTORYfacial surgery due to fall      PHACOEMULSIFICATION CLEAR CORNEA WITH STANDARD INTRAOCULAR LENS IMPLANT Right 7/13/2018    Procedure: PHACOEMULSIFICATION CLEAR CORNEA WITH STANDARD INTRAOCULAR LENS IMPLANT;  RIght Eye Phacoemulsification Clear Cornea with Standard Intraocular Lens Placement ;  Surgeon: Mary Jo Massey MD;  Location: [facility] OR     PHACOEMULSIFICATION CLEAR CORNEA WITH TORIC INTRAOCULAR LENS IMPLANT Left 5/18/2018    Procedure: PHACOEMULSIFICATION CLEAR CORNEA WITH TORIC INTRAOCULAR LENS IMPLANT;  Left Eye Phacoemulsification with Toric Lens;  Surgeon: Mary Jo Massey MD;  Location: [facility] OR     REMOVE PORT VASCULAR ACCESS Right 11/12/2021    Procedure: REMOVAL, VASCULAR ACCESS PORT right;  Surgeon: Afsaneh Das PA-C;  Location: [facility] OR     SALPINGO OOPHORECTOMY,R/L/KAYY Right 2008     "Salpingo Oophorectomy, RT     SALPINGO OOPHORECTOMY,R/L/KAYY Left 07/07/2017    Laparotomy; for ovarian cancer staging     SALPINGOOPHORECTOMY  2008     SURGICAL HISTORY OF -       facial surgery d/t fall in 1/2002     SURGICAL HISTORY OF -       1985 removal of breast cyst     SURGICAL HISTORY OF -   2008    endometrial ablation     YAG CAPSULOTOMY OD (RIGHT EYE)  10/22/2018            Medications     Current Outpatient Medications   Medication     Acetaminophen (TYLENOL PO)     ASPIRIN NOT PRESCRIBED (INTENTIONAL)     atorvastatin (LIPITOR) 80 MG tablet     B Complex Vitamins (VITAMIN B-COMPLEX PO)     CALCIUM-MAGNESIUM-VITAMIN D PO     cephALEXin (KEFLEX) 500 MG capsule     Cholecalciferol (VITAMIN D) 1000 UNIT capsule     DiphenhydrAMINE HCl (BENADRYL PO)     EPINEPHrine (ANY BX GENERIC EQUIV) 0.3 MG/0.3ML injection 2-pack     escitalopram (LEXAPRO) 20 MG tablet     fluticasone (FLONASE) 50 MCG/ACT nasal spray     LORazepam (ATIVAN) 1 MG tablet     Magnesium Hydroxide (MAGNESIA PO)     metFORMIN (GLUCOPHAGE XR) 500 MG 24 hr tablet     Multiple Vitamins-Minerals (MULTIVITAMIN ADULT PO)     nitroFURantoin macrocrystal-monohydrate (MACROBID) 100 MG capsule     nystatin (MYCOSTATIN) 227427 UNIT/GM external powder     Probiotic Product (PRO-BIOTIC BLEND PO)     rivaroxaban ANTICOAGULANT (XARELTO) 20 MG TABS tablet     tamoxifen (NOLVADEX) 10 MG tablet     traZODone (DESYREL) 50 MG tablet     Current Facility-Administered Medications   Medication     lidocaine (XYLOCAINE) 2 % external gel            Allergies:   Paclitaxel, Wasps [hornets], Yellow hornet venom [hornet venom], Yellow jacket venom [venomil honey bee], and Sulfa drugs         Review of Systems:  From intake questionnaire   Negative 14 system review except as noted on HPI, nurse's note.         Physical Exam:   Patient is a 67 year old  female   Vitals: Blood pressure 123/83, pulse 84, height 1.6 m (5' 3\"), weight 93.4 kg (206 lb), last menstrual period " 01/01/2009, not currently breastfeeding.  General Appearance Adult: Alert, no acute distress, oriented  Lungs: no respiratory distress, or pursed lip breathing  Heart: No obvious jugular venous distension present  Abdomen: soft, nontender, no organomegaly or masses, Body mass index is 36.49 kg/m .  : deferred      Labs and Pathology:    I personally reviewed all applicable laboratory data and went over findings with patient  Significant for:    CBC RESULTS:  Recent Labs   Lab Test 11/14/22  1327 08/18/22  1736 11/12/21  0807 07/18/21 2212   WBC 6.8 6.0 6.0 8.5   HGB 11.8 10.4* 11.5* 11.9    366 254 249        BMP RESULTS:  Recent Labs   Lab Test 08/18/22  1736 07/06/22  1125 11/15/21  1806 11/12/21  0813 10/04/21  1653 07/18/21  2212 07/18/21  2212 06/28/21  1437 03/27/20  1510 01/14/20  0327 01/03/20  1001    139  --   --  139  --  142 143 137 143 138   POTASSIUM 3.7 4.1  --   --  3.8  --  3.8 4.4 3.9 3.8 4.1   CHLORIDE 107 107  --   --  107  --  111* 109 105 109 106   CO2 22 23  --   --  25  --  26 28 28 30 24   ANIONGAP 13 9  --   --  7  --  5 6 4 4 8   * 127*  --  134* 138*   < > 127* 96 80 147* 118*   BUN 19.7 28  --   --  27  --  21 25 19 35* 24   CR 1.10* 1.10* 1.3*  --  1.18*   < > 1.19* 1.20* 1.14* 1.30* 1.24*   GFRESTIMATED 54* 54* 43*  --  48*   < > 47* 47* 50* 43* 45*   GFRESTBLACK  --   --   --   --   --   --   --  54* 58* 50* 52*   COLE 9.1 8.7  --   --  8.9  --  9.1 9.1 8.9 9.1 9.5    < > = values in this interval not displayed.       UA RESULTS:   Recent Labs   Lab Test 07/29/22  1932 07/27/21  1808 07/18/21  2336 06/25/21  0840   SG 1.025 >=1.030 1.015 1.018   URINEPH 5.0 5.0 7.0 5.0   NITRITE Negative Negative Negative Negative   RBCU 2-5*  --  3* 5*   WBCU 5-10*  --  1 3       CALCIUM RESULTS  Lab Results   Component Value Date    COLE 9.1 08/18/2022    COLE 8.7 07/06/2022    COLE 8.9 10/04/2021    COLE 9.1 06/28/2021    COLE 8.9 03/27/2020    COLE 9.1 01/14/2020       PSA  RESULTS  No results found for: PSA    INR  Recent Labs   Lab Test 08/18/22  2340 11/12/21  0807 08/21/17  0810 07/02/17  2255   INR 1.49* 0.93 1.05 1.17*           Imaging:    I personally reviewed all applicable imaging and went over findings with patient.  Significant for:    Results for orders placed or performed in visit on 11/14/22   US Renal Complete    Narrative    EXAMINATION: US RENAL COMPLETE NON-VASCULAR, 11/14/2022 1:20 PM     COMPARISON: 6/28/2021    HISTORY: Angiomyolipoma.  Per chart, Angiomyolipoma status post right  nephrectomy     TECHNIQUE: The kidneys and bladder were scanned in the standard  fashion with specialized ultrasound transducer(s) using both gray  scale and limited color/spectral Doppler techniques.    FINDINGS:    Right kidney: Surgically absent.    Left kidney: Measures 10.3 cm in length. Parenchyma is of normal  thickness and echogenicity. No focal mass. No hydronephrosis.     Bladder: Decompressed.      Impression    IMPRESSION:  Normal sonographic evaluation of the left kidney.    MINI ONEIL MD         SYSTEM ID:  [id number]     *Note: Due to a large number of results and/or encounters for the requested time period, some results have not been displayed. A complete set of results can be found in Results Review.

## 2022-11-17 LAB — BACTERIA UR CULT: NORMAL

## 2022-12-07 NOTE — RESULT ENCOUNTER NOTE
MsJuan Corona,   Your renal ultrasound is normal.  We should plan for another renal ultrasound in 2 years.  Thank you, Zachery Mercado.

## 2022-12-21 ENCOUNTER — OFFICE VISIT (OUTPATIENT)
Dept: DERMATOLOGY | Facility: CLINIC | Age: 67
End: 2022-12-21
Attending: NURSE PRACTITIONER
Payer: COMMERCIAL

## 2022-12-21 DIAGNOSIS — D22.9 MULTIPLE BENIGN NEVI: ICD-10-CM

## 2022-12-21 DIAGNOSIS — L82.0 INFLAMED SEBORRHEIC KERATOSIS: ICD-10-CM

## 2022-12-21 DIAGNOSIS — L57.0 ACTINIC KERATOSIS: ICD-10-CM

## 2022-12-21 DIAGNOSIS — L82.1 SEBORRHEIC KERATOSIS: ICD-10-CM

## 2022-12-21 DIAGNOSIS — L81.4 LENTIGO: Primary | ICD-10-CM

## 2022-12-21 DIAGNOSIS — D18.01 CHERRY ANGIOMA: ICD-10-CM

## 2022-12-21 DIAGNOSIS — L70.0 OPEN COMEDONE: ICD-10-CM

## 2022-12-21 PROCEDURE — 17110 DESTRUCTION B9 LES UP TO 14: CPT | Performed by: PHYSICIAN ASSISTANT

## 2022-12-21 PROCEDURE — 99213 OFFICE O/P EST LOW 20 MIN: CPT | Mod: 25 | Performed by: PHYSICIAN ASSISTANT

## 2022-12-21 PROCEDURE — 17000 DESTRUCT PREMALG LESION: CPT | Mod: 59 | Performed by: PHYSICIAN ASSISTANT

## 2022-12-21 NOTE — LETTER
12/21/2022         RE: Hafsa Corona  [address]  [place] [address]        Dear Colleague,    Thank you for referring your patient, Hafsa Corona, to the [facility]. Please see a copy of my visit note below.    Hafsa Corona is an extremely pleasant 67 year old year old female patient here today for skin check. She notes spots on face. Present for a while. No painful or bleeding skin lesions. She notes areas feel scaly. She notes she had an inflamed cyst which has since resolved. Patient has no other skin complaints today.  Remainder of the HPI, Meds, PMH, Allergies, FH, and SH was reviewed in chart.    Pertinent Hx:  No personal history of skin cancer.   Past Medical History:   Diagnosis Date     Anxiety state, unspecified     [id number]     Arthritis 2014    vague, gradual, not treated really, knees feel it     At high risk for breast cancer 09/27/2019    30.3% lifetime risk by SHERON model     Attention deficit disorder without mention of hyperactivity      Cancer (H) 7/2017 and 1/2018    ovarian and kidney cancers, both treated well     Chronic rhinitis      Depressive disorder lifetime    plus Anxiety plus ADD. Escitalipram, therapy, ADD meds maybe     Heart disease 1999    tachycardia and high cholesterol, managed     History of blood transfusion 07/2017    while in hospital for ovarian cancer     Hypertension 1999    tho BP was too LOW last week. past 12 years Generally OK     Panic disorder without agoraphobia      Pure hypercholesterolemia     Lipitor     Tachycardia, unspecified     9/1999-2/2004     Type II or unspecified type diabetes mellitus without mention of complication, not stated as uncontrolled     diagnosed 2004       Past Surgical History:   Procedure Laterality Date     ABSCESS DRAINAGE Left     left leg      AS REMV KIDNEY,RADICAL Right      BIOPSY  7/2017 and 2/2018    ovarian and kidney cancer biopsies. also 1987 breast benign     BREAST CYST  EXCISION  1985     BREAST SURGERY  1987    date unsure. benign breast cyst removed     CATARACT IOL, RT/LT Left 05/18/2018     CATARACT IOL, RT/LT Right 07/1318     COLONOSCOPY  2008 and 2013    polyps removed. Next due fall 2018     ENDOMETRIAL ABLATION  2008     EXCISE MASS BACK N/A 12/6/2021    Procedure: EXCISION, MASS, BACK;  Surgeon: Norris Vela MD;  Location: [facility] OR     HYSTERECTOMY TOTAL ABDOMINAL, BILATERAL SALPINGO-OOPHORECTOMY, NODE DISSECTION, COMBINED Left 7/7/2017    Procedure: COMBINED HYSTERECTOMY TOTAL ABDOMINAL, SALPINGO-OOPHORECTOMY, NODE DISSECTION;  Exploratory Laparotomy, Left Salpingo-Oophorectomy, Cancer Staging, Total Hysterectomy, Omentectomy, Evacuation of abdominal fluid, Lymph Node Dissection  Anesthesia Block ;  Surgeon: Serena Cole MD;  Location: [facility] OR     HYSTERECTOMY, PAP NO LONGER INDICATED  07/07/2017    Laparotomy; for ovarian cancer staging     HYSTERECTOMY, PAP NO LONGER INDICATED       INSERT PORT VASCULAR ACCESS Right 8/21/2017    Procedure: INSERT PORT VASCULAR ACCESS;  Single Lumen Chest Power Port;  Surgeon: Stephen Mike PA-C;  [facility]     IR PORT REMOVAL RIGHT  11/12/2021     LYMPHADENECTOMY RETROPERITONEAL Bilateral 07/07/2017    Laparotomy; pelvics & para-aortics; for ovarian cancer staging     OMENTECTOMY  07/07/2017    Laparotomy; for ovarian cancer staging     ORTHOPEDIC SURGERY  1/2002    broken left jaw due to fall, 4 fractures, titanium plates     OTHER SURGICAL HISTORY  01/2002    OTHER SURGICAL HISTORYfacial surgery due to fall      PHACOEMULSIFICATION CLEAR CORNEA WITH STANDARD INTRAOCULAR LENS IMPLANT Right 7/13/2018    Procedure: PHACOEMULSIFICATION CLEAR CORNEA WITH STANDARD INTRAOCULAR LENS IMPLANT;  RIght Eye Phacoemulsification Clear Cornea with Standard Intraocular Lens Placement ;  Surgeon: Mary Jo Massey MD;  [facility]     PHACOEMULSIFICATION CLEAR CORNEA WITH TORIC INTRAOCULAR LENS IMPLANT Left 5/18/2018     Procedure: PHACOEMULSIFICATION CLEAR CORNEA WITH TORIC INTRAOCULAR LENS IMPLANT;  Left Eye Phacoemulsification with Toric Lens;  Surgeon: Mary Jo Massey MD;  [facility]     REMOVE PORT VASCULAR ACCESS Right 2021    Procedure: REMOVAL, VASCULAR ACCESS PORT right;  Surgeon: Afsaneh Das PA-C;  Location: [facility] OR     SALPINGO OOPHORECTOMY,R/L/KAYY Right     Salpingo Oophorectomy, RT     SALPINGO OOPHORECTOMY,R/L/KAYY Left 2017    Laparotomy; for ovarian cancer staging     SALPINGOOPHORECTOMY       SURGICAL HISTORY OF -       facial surgery d/t fall in 2002     SURGICAL HISTORY OF -        removal of breast cyst     SURGICAL HISTORY OF -       endometrial ablation     YAG CAPSULOTOMY OD (RIGHT EYE)  10/22/2018        Family History   Problem Relation Age of Onset     C.A.D. Mother      Hypertension Mother      Breast Cancer Mother      Psychotic Disorder Mother      Colon Cancer Mother      Cancer Mother         Bone cancer     Coronary Artery Disease Mother      Hyperlipidemia Mother         treated w. statins     Other Cancer Mother         bone/marrow, ,  of this     Depression Mother      Anxiety Disorder Mother      Mental Illness Mother         various undiagnosed types, but THERE     Obesity Mother      Bone Cancer Mother      Other - See Comments Mother         psychotic disease      C.A.D. Father      Diabetes Father      Hypertension Father      Cerebrovascular Disease Father         1984 or      Psychotic Disorder Father      Pancreatic Cancer Father      Coronary Artery Disease Father      Hyperlipidemia Father         treated w statins     Other Cancer Father         pancreatic, ,  of this     Depression Father      Mental Illness Father         likely PTSD and depression     Obesity Father      Other - See Comments Father         cerebrovascular disease, psychotic disease      Psychotic Disorder Sister         x2     Neurologic Disorder  Sister      Hypertension Sister      Hyperlipidemia Sister         treated w statins     Depression Sister      Anxiety Disorder Sister      Obesity Sister      Rashes/Skin Problems Sister         precancerous lesion on leg     Psychotic Disorder Sister      Other - See Comments Sister         small kidney stone      Hypertension Sister      Hyperlipidemia Sister         treated w statins     Depression Sister      Anxiety Disorder Sister      Other - See Comments Sister         psychotic disease      Prostate Problems Brother         cleared      Hypertension Brother      Hyperlipidemia Brother         unsure if treated w statins     Depression Brother      Anxiety Disorder Brother      Mental Illness Brother         undiagnosed but THERE     Obesity Brother      Pacemaker Brother 70        bradycardia, sick sinus syndrome     Psychotic Disorder Brother         x2     Depression Brother         major depressive disorder and OCD     Anxiety Disorder Brother      Mental Illness Brother         not sure of diagnoses, treated     Hypertension Brother      Hyperlipidemia Brother      Prostate Cancer Brother      Depression Brother      Anxiety Disorder Brother      Other - See Comments Brother         psychotic disease      Psychotic Disorder Maternal Grandmother         ?     Coronary Artery Disease Maternal Grandmother          of heart attack age 84?     Depression Maternal Grandmother      Psychotic Disorder Maternal Grandfather         ?     Psychotic Disorder Paternal Grandmother         Schizophernia     Coronary Artery Disease Paternal Grandmother          of heart attack, age 85?     Depression Paternal Grandmother         severe, but recovered     Mental Illness Paternal Grandmother         possible bipolar or other     Schizophrenia Paternal Grandmother      Psychotic Disorder Paternal Grandfather         ?     Respiratory Paternal Grandfather          of emphysema and smoking     Emphysema  Paternal Grandfather      Cancer - colorectal No family hx of      Glaucoma No family hx of      Macular Degeneration No family hx of        Social History     Socioeconomic History     Marital status: Single     Spouse name: Not on file     Number of children: 0     Years of education: Not on file     Highest education level: Not on file   Occupational History     Comment: [employer]   Tobacco Use     Smoking status: Never     Smokeless tobacco: Never   Vaping Use     Vaping Use: Never used   Substance and Sexual Activity     Alcohol use: Yes     Comment: Very infrequent, a bit of wine or beer 2x per month maybe     Drug use: Yes     Types: Marijuana     Comment: infrequent, for celebrations only, maybe 8x per year     Sexual activity: Yes     Partners: Male     Birth control/protection: None     Comment: long-time    Other Topics Concern     Parent/sibling w/ CABG, MI or angioplasty before 65F 55M? No   Social History Narrative     Not on file     Social Determinants of Health     Financial Resource Strain: Not on file   Food Insecurity: Not on file   Transportation Needs: Not on file   Physical Activity: Not on file   Stress: Not on file   Social Connections: Not on file   Intimate Partner Violence: Not At Risk     Fear of Current or Ex-Partner: No     Emotionally Abused: No     Physically Abused: No     Sexually Abused: No   Housing Stability: Not on file       Outpatient Encounter Medications as of 12/21/2022   Medication Sig Dispense Refill     Acetaminophen (TYLENOL PO) Take 500 mg by mouth 2 tabs prn pain (monthly)       ASPIRIN NOT PRESCRIBED (INTENTIONAL) Please choose reason not prescribed from choices below.       atorvastatin (LIPITOR) 80 MG tablet TAKE 1 TABLET(80 MG) BY MOUTH DAILY 90 tablet 3     B Complex Vitamins (VITAMIN B-COMPLEX PO) Take 50 mg by mouth        CALCIUM-MAGNESIUM-VITAMIN D PO Take 2 tablets by mouth daily       cephALEXin (KEFLEX) 500 MG capsule Take 1 capsule  (500 mg) by mouth 4 times daily 20 capsule 0     Cholecalciferol (VITAMIN D) 1000 UNIT capsule Take 2,000 Units by mouth daily        DiphenhydrAMINE HCl (BENADRYL PO) Take 50 mg by mouth daily as needed (monthly)       EPINEPHrine (ANY BX GENERIC EQUIV) 0.3 MG/0.3ML injection 2-pack Inject 0.3 mLs (0.3 mg) into the muscle once as needed for anaphylaxis 0.3 mL PRN     escitalopram (LEXAPRO) 20 MG tablet Take 1 tablet (20 mg) by mouth daily 90 tablet 1     fluticasone (FLONASE) 50 MCG/ACT nasal spray SHAKE LIQUID AND USE 2 SPRAYS IN EACH NOSTRIL DAILY 48 g 3     LORazepam (ATIVAN) 1 MG tablet Take 1 tablet (1 mg) by mouth every 6 hours as needed (nausea/vomiting, anxiety or sleep) 30 tablet 0     Magnesium Hydroxide (MAGNESIA PO) Take 500 mg by mouth       metFORMIN (GLUCOPHAGE XR) 500 MG 24 hr tablet TAKE 2 TABLETS BY MOUTH EVERY DAY WITH THE EVENING MEAL 180 tablet 3     Multiple Vitamins-Minerals (MULTIVITAMIN ADULT PO) Take 1 tablet by mouth daily       nitroFURantoin macrocrystal-monohydrate (MACROBID) 100 MG capsule TAKE 1 CAPSULE BY MOUTH AFTER INTERCOURSE AS NEEDED 30 capsule PRN     nystatin (MYCOSTATIN) 089017 UNIT/GM external powder Apply topically 2 times daily as needed Apply to folds under breast and abdomen. 60 g 1     Probiotic Product (PRO-BIOTIC BLEND PO) Take 1 capsule by mouth daily Enzymatic Therapy-probiotic pearls       rivaroxaban ANTICOAGULANT (XARELTO) 20 MG TABS tablet Take 1 tablet (20 mg) by mouth daily (with dinner) 30 tablet 11     tamoxifen (NOLVADEX) 10 MG tablet Cut each pill in 1/2 with pill cutter to ensure 5 mg daily dose. 45 tablet 3     traZODone (DESYREL) 50 MG tablet TAKE 1 TABLET  BY MOUTH EVERY NIGHT AS NEEDED FOR SLEEP 180 tablet PRN     Facility-Administered Encounter Medications as of 12/21/2022   Medication Dose Route Frequency Provider Last Rate Last Admin     lidocaine (XYLOCAINE) 2 % external gel   Urethral Once Leland Mercado MD                 O:   NAD, WDWN,  Alert & Oriented, Mood & Affect wnl, Vitals stable   Here today alone   LMP 01/01/2009    General appearance normal   Vitals stable   Alert, oriented and in no acute distress     Gritty papule on right cheek   Brown stuck on papule on left cheek   Stuck on papules and brown macules on trunk and ext   Red papules on trunk  Brown papules and macules with regular pigment network and borders on torso and extremities  Skin colored papules on face   Comedones on back    The remainder of skin exam is normal       Eyes: Conjunctivae/lids:Normal     ENT: Lips: normal    MSK:Normal    Cardiovascular: peripheral edema none    Pulm: Breathing Normal    Neuro/Psych: Orientation:Alert and Orientedx3 ; Mood/Affect:normal   A/P:  1. Actinic keratosis on right cheek x 1  LN2:  Treated with LN2 for 5s for 1-2 cycles. Warned risks of blistering, pain, pigment change, scarring, and incomplete resolution.  Advised patient to return if lesions do not completely resolve.  Wound care sheet given.  2. Inflamed seborrheic keratosis on left temple x 1  LN2:  Treated with LN2 for 5s for 1-2 cycles. Warned risks of blistering, pain, pigment change, scarring, and incomplete resolution.  Advised patient to return if lesions do not completely resolve.  Wound care sheet given.  3. Seborrheic keratosis, lentigo, angioma, benign nevi, open comedones    It was a pleasure speaking to Hafsa Corona today.  BENIGN LESIONS DISCUSSED WITH PATIENT:  I discussed the specifics of tumor, prognosis, and genetics of benign lesions.  I explained that treatment of these lesions would be purely cosmetic and not medically neccessary.  I discussed with patient different removal options including excision, cautery and /or laser.      Nature and genetics of benign skin lesions dicussed with patient.  Signs and Symptoms of skin cancer discussed with patient.  ABCDEs of melanoma reviewed with patient.  Patient encouraged to perform monthly skin exams.  UV precautions  reviewed with patient.  Risks of non-melanoma skin cancer discussed with patient   Return to clinic in one year or sooner if needed.       Again, thank you for allowing me to participate in the care of your patient.        Sincerely,        Andreia Purcell PA-C

## 2022-12-21 NOTE — PATIENT INSTRUCTIONS
WOUND CARE INSTRUCTIONS  FOR CRYOSURGERY  For questions please call [phone]        This area treated with liquid nitrogen will form a blister. You do not need to bandage the area until after the blister forms and breaks (which may be a few days).  When the blister breaks, begin daily dressing changes as follows:    1) Clean and dry the area with tap water using clean Q-tip or sterile gauze pad.    2) Apply Vaseline or Aquaphor over entire wound. Other options include Polysporin ointment or Bacitracin ointment over entire wound.  Do NOT use Neosporin ointment.    3) Cover the wound with a band-aid or sterile non-stick gauze pad and micropore paper tape.      REPEAT THESE INSTRUCTIONS AT LEAST ONCE A DAY UNTIL THE WOUND HAS COMPLETELY HEALED.        It is an old wives tale that a wound heals better when it is exposed to air and allowed to dry out. The wound will heal faster with a better cosmetic result if it is kept moist with ointment and covered with a bandage.  Do not let the wound dry out.      Supplies Needed:     *Cotton tipped applicators (Q-tips)   *Polysporin ointment or Bacitracin ointment (NOT NEOSPORIN)   *Band-aids, or non stick gauze pads and micropore paper tape    PATIENT INFORMATION    During the healing process you will notice a number of changes. All wounds develop a small halo of redness surrounding the wound.  This means healing is occurring. Severe itching with extensive redness usually indicates sensitivity to the ointment or bandage tape used to dress the wound.  You should call our office if this develops.      Swelling and/or discoloration around your surgical site is common, particularly when performed around the eye.    All wounds normally drain.  The larger the wound the more drainage there will be.  After 7-10 days, you will notice the wound beginning to shrink and new skin will begin to grow.  The wound is healed when you can see skin has formed over the entire area.  A healed  wound has a healthy, shiny look to the surface and is red to dark pink in color to normalize.  Wounds may take approximately 4-6 weeks to heal.  Larger wounds may take 6-8 weeks.  After the wound is healed you may discontinue dressing changes.    You may experience a sensation of tightness as your wound heals. This is normal and will gradually subside.    Your healed wound may be sensitive to temperature changes. This sensitivity improves with time, but if you re having a lot of discomfort, try to avoid temperature extremes.    Patients frequently experience itching after their wound appears to have healed because of the continue healing under the skin.  Plain Vaseline will help relieve the itching.

## 2022-12-22 NOTE — PROGRESS NOTES
Hafsa Corona is an extremely pleasant 67 year old year old female patient here today for skin check. She notes spots on face. Present for a while. No painful or bleeding skin lesions. She notes areas feel scaly. She notes she had an inflamed cyst which has since resolved. Patient has no other skin complaints today.  Remainder of the HPI, Meds, PMH, Allergies, FH, and SH was reviewed in chart.    Pertinent Hx:  No personal history of skin cancer.   Past Medical History:   Diagnosis Date     Anxiety state, unspecified     [id number]     Arthritis 2014    vague, gradual, not treated really, knees feel it     At high risk for breast cancer 09/27/2019    30.3% lifetime risk by SHEORN model     Attention deficit disorder without mention of hyperactivity      Cancer (H) 7/2017 and 1/2018    ovarian and kidney cancers, both treated well     Chronic rhinitis      Depressive disorder lifetime    plus Anxiety plus ADD. Escitalipram, therapy, ADD meds maybe     Heart disease 1999    tachycardia and high cholesterol, managed     History of blood transfusion 07/2017    while in hospital for ovarian cancer     Hypertension 1999    tho BP was too LOW last week. past 12 years Generally OK     Panic disorder without agoraphobia      Pure hypercholesterolemia     Lipitor     Tachycardia, unspecified     9/1999-2/2004     Type II or unspecified type diabetes mellitus without mention of complication, not stated as uncontrolled     diagnosed 2004       Past Surgical History:   Procedure Laterality Date     ABSCESS DRAINAGE Left     left leg      AS REMV KIDNEY,RADICAL Right      BIOPSY  7/2017 and 2/2018    ovarian and kidney cancer biopsies. also 1987 breast benign     BREAST CYST EXCISION  1985     BREAST SURGERY  1987    date unsure. benign breast cyst removed     CATARACT IOL, RT/LT Left 05/18/2018     CATARACT IOL, RT/LT Right 07/1318     COLONOSCOPY  2008 and 2013    polyps removed. Next due fall 2018     ENDOMETRIAL ABLATION  2008      EXCISE MASS BACK N/A 12/6/2021    Procedure: EXCISION, MASS, BACK;  Surgeon: Norris Vela MD;  Location: [facility] OR     HYSTERECTOMY TOTAL ABDOMINAL, BILATERAL SALPINGO-OOPHORECTOMY, NODE DISSECTION, COMBINED Left 7/7/2017    Procedure: COMBINED HYSTERECTOMY TOTAL ABDOMINAL, SALPINGO-OOPHORECTOMY, NODE DISSECTION;  Exploratory Laparotomy, Left Salpingo-Oophorectomy, Cancer Staging, Total Hysterectomy, Omentectomy, Evacuation of abdominal fluid, Lymph Node Dissection  Anesthesia Block ;  Surgeon: Serena Cole MD;  Location: [facility]     HYSTERECTOMY, PAP NO LONGER INDICATED  07/07/2017    Laparotomy; for ovarian cancer staging     HYSTERECTOMY, PAP NO LONGER INDICATED       INSERT PORT VASCULAR ACCESS Right 8/21/2017    Procedure: INSERT PORT VASCULAR ACCESS;  Single Lumen Chest Power Port;  Surgeon: Stephen Mike PA-C;  [facility]     IR PORT REMOVAL RIGHT  11/12/2021     LYMPHADENECTOMY RETROPERITONEAL Bilateral 07/07/2017    Laparotomy; pelvics & para-aortics; for ovarian cancer staging     OMENTECTOMY  07/07/2017    Laparotomy; for ovarian cancer staging     ORTHOPEDIC SURGERY  1/2002    broken left jaw due to fall, 4 fractures, titanium plates     OTHER SURGICAL HISTORY  01/2002    OTHER SURGICAL HISTORYfacial surgery due to fall      PHACOEMULSIFICATION CLEAR CORNEA WITH STANDARD INTRAOCULAR LENS IMPLANT Right 7/13/2018    Procedure: PHACOEMULSIFICATION CLEAR CORNEA WITH STANDARD INTRAOCULAR LENS IMPLANT;  RIght Eye Phacoemulsification Clear Cornea with Standard Intraocular Lens Placement ;  Surgeon: Mary Jo Massey MD;  [facility]     PHACOEMULSIFICATION CLEAR CORNEA WITH TORIC INTRAOCULAR LENS IMPLANT Left 5/18/2018    Procedure: PHACOEMULSIFICATION CLEAR CORNEA WITH TORIC INTRAOCULAR LENS IMPLANT;  Left Eye Phacoemulsification with Toric Lens;  Surgeon: Mary Jo Massey MD;  [facility]     REMOVE PORT VASCULAR ACCESS Right 11/12/2021    Procedure: REMOVAL, VASCULAR  ACCESS PORT right;  Surgeon: Afsaneh Das PA-C;  Location: [facility] OR     SALPINGO OOPHORECTOMY,R/L/KAYY Right     Salpingo Oophorectomy, RT     SALPINGO OOPHORECTOMY,R/L/KAYY Left 2017    Laparotomy; for ovarian cancer staging     SALPINGOOPHORECTOMY       SURGICAL HISTORY OF -       facial surgery d/t fall in 2002     SURGICAL HISTORY OF -        removal of breast cyst     SURGICAL HISTORY OF -       endometrial ablation     YAG CAPSULOTOMY OD (RIGHT EYE)  10/22/2018        Family History   Problem Relation Age of Onset     C.A.D. Mother      Hypertension Mother      Breast Cancer Mother      Psychotic Disorder Mother      Colon Cancer Mother      Cancer Mother         Bone cancer     Coronary Artery Disease Mother      Hyperlipidemia Mother         treated w. statins     Other Cancer Mother         bone/marrow, ,  of this     Depression Mother      Anxiety Disorder Mother      Mental Illness Mother         various undiagnosed types, but THERE     Obesity Mother      Bone Cancer Mother      Other - See Comments Mother         psychotic disease      C.A.D. Father      Diabetes Father      Hypertension Father      Cerebrovascular Disease Father         1984 or      Psychotic Disorder Father      Pancreatic Cancer Father      Coronary Artery Disease Father      Hyperlipidemia Father         treated w statins     Other Cancer Father         pancreatic, ,  of this     Depression Father      Mental Illness Father         likely PTSD and depression     Obesity Father      Other - See Comments Father         cerebrovascular disease, psychotic disease      Psychotic Disorder Sister         x2     Neurologic Disorder Sister      Hypertension Sister      Hyperlipidemia Sister         treated w statins     Depression Sister      Anxiety Disorder Sister      Obesity Sister      Rashes/Skin Problems Sister         precancerous lesion on leg     Psychotic Disorder Sister      Other -  See Comments Sister         small kidney stone      Hypertension Sister      Hyperlipidemia Sister         treated w statins     Depression Sister      Anxiety Disorder Sister      Other - See Comments Sister         psychotic disease      Prostate Problems Brother         cleared      Hypertension Brother      Hyperlipidemia Brother         unsure if treated w statins     Depression Brother      Anxiety Disorder Brother      Mental Illness Brother         undiagnosed but THERE     Obesity Brother      Pacemaker Brother 70        bradycardia, sick sinus syndrome     Psychotic Disorder Brother         x2     Depression Brother         major depressive disorder and OCD     Anxiety Disorder Brother      Mental Illness Brother         not sure of diagnoses, treated     Hypertension Brother      Hyperlipidemia Brother      Prostate Cancer Brother      Depression Brother      Anxiety Disorder Brother      Other - See Comments Brother         psychotic disease      Psychotic Disorder Maternal Grandmother         ?     Coronary Artery Disease Maternal Grandmother          of heart attack age 84?     Depression Maternal Grandmother      Psychotic Disorder Maternal Grandfather         ?     Psychotic Disorder Paternal Grandmother         Schizophernia     Coronary Artery Disease Paternal Grandmother          of heart attack, age 85?     Depression Paternal Grandmother         severe, but recovered     Mental Illness Paternal Grandmother         possible bipolar or other     Schizophrenia Paternal Grandmother      Psychotic Disorder Paternal Grandfather         ?     Respiratory Paternal Grandfather          of emphysema and smoking     Emphysema Paternal Grandfather      Cancer - colorectal No family hx of      Glaucoma No family hx of      Macular Degeneration No family hx of        Social History     Socioeconomic History     Marital status: Single     Spouse name: Not on file     Number of children: 0     Years  of education: Not on file     Highest education level: Not on file   Occupational History     Comment: [employer]   Tobacco Use     Smoking status: Never     Smokeless tobacco: Never   Vaping Use     Vaping Use: Never used   Substance and Sexual Activity     Alcohol use: Yes     Comment: Very infrequent, a bit of wine or beer 2x per month maybe     Drug use: Yes     Types: Marijuana     Comment: infrequent, for celebrations only, maybe 8x per year     Sexual activity: Yes     Partners: Male     Birth control/protection: None     Comment: long-time    Other Topics Concern     Parent/sibling w/ CABG, MI or angioplasty before 65F 55M? No   Social History Narrative     Not on file     Social Determinants of Health     Financial Resource Strain: Not on file   Food Insecurity: Not on file   Transportation Needs: Not on file   Physical Activity: Not on file   Stress: Not on file   Social Connections: Not on file   Intimate Partner Violence: Not At Risk     Fear of Current or Ex-Partner: No     Emotionally Abused: No     Physically Abused: No     Sexually Abused: No   Housing Stability: Not on file       Outpatient Encounter Medications as of 12/21/2022   Medication Sig Dispense Refill     Acetaminophen (TYLENOL PO) Take 500 mg by mouth 2 tabs prn pain (monthly)       ASPIRIN NOT PRESCRIBED (INTENTIONAL) Please choose reason not prescribed from choices below.       atorvastatin (LIPITOR) 80 MG tablet TAKE 1 TABLET(80 MG) BY MOUTH DAILY 90 tablet 3     B Complex Vitamins (VITAMIN B-COMPLEX PO) Take 50 mg by mouth        CALCIUM-MAGNESIUM-VITAMIN D PO Take 2 tablets by mouth daily       cephALEXin (KEFLEX) 500 MG capsule Take 1 capsule (500 mg) by mouth 4 times daily 20 capsule 0     Cholecalciferol (VITAMIN D) 1000 UNIT capsule Take 2,000 Units by mouth daily        DiphenhydrAMINE HCl (BENADRYL PO) Take 50 mg by mouth daily as needed (monthly)       EPINEPHrine (ANY BX GENERIC EQUIV) 0.3 MG/0.3ML  injection 2-pack Inject 0.3 mLs (0.3 mg) into the muscle once as needed for anaphylaxis 0.3 mL PRN     escitalopram (LEXAPRO) 20 MG tablet Take 1 tablet (20 mg) by mouth daily 90 tablet 1     fluticasone (FLONASE) 50 MCG/ACT nasal spray SHAKE LIQUID AND USE 2 SPRAYS IN EACH NOSTRIL DAILY 48 g 3     LORazepam (ATIVAN) 1 MG tablet Take 1 tablet (1 mg) by mouth every 6 hours as needed (nausea/vomiting, anxiety or sleep) 30 tablet 0     Magnesium Hydroxide (MAGNESIA PO) Take 500 mg by mouth       metFORMIN (GLUCOPHAGE XR) 500 MG 24 hr tablet TAKE 2 TABLETS BY MOUTH EVERY DAY WITH THE EVENING MEAL 180 tablet 3     Multiple Vitamins-Minerals (MULTIVITAMIN ADULT PO) Take 1 tablet by mouth daily       nitroFURantoin macrocrystal-monohydrate (MACROBID) 100 MG capsule TAKE 1 CAPSULE BY MOUTH AFTER INTERCOURSE AS NEEDED 30 capsule PRN     nystatin (MYCOSTATIN) 862064 UNIT/GM external powder Apply topically 2 times daily as needed Apply to folds under breast and abdomen. 60 g 1     Probiotic Product (PRO-BIOTIC BLEND PO) Take 1 capsule by mouth daily Enzymatic Therapy-probiotic pearls       rivaroxaban ANTICOAGULANT (XARELTO) 20 MG TABS tablet Take 1 tablet (20 mg) by mouth daily (with dinner) 30 tablet 11     tamoxifen (NOLVADEX) 10 MG tablet Cut each pill in 1/2 with pill cutter to ensure 5 mg daily dose. 45 tablet 3     traZODone (DESYREL) 50 MG tablet TAKE 1 TABLET  BY MOUTH EVERY NIGHT AS NEEDED FOR SLEEP 180 tablet PRN     Facility-Administered Encounter Medications as of 12/21/2022   Medication Dose Route Frequency Provider Last Rate Last Admin     lidocaine (XYLOCAINE) 2 % external gel   Urethral Once Leland Mercado MD                 O:   NAD, WDWN, Alert & Oriented, Mood & Affect wnl, Vitals stable   Here today alone   LMP 01/01/2009    General appearance normal   Vitals stable   Alert, oriented and in no acute distress     Gritty papule on right cheek   Brown stuck on papule on left cheek   Stuck on papules  and brown macules on trunk and ext   Red papules on trunk  Brown papules and macules with regular pigment network and borders on torso and extremities  Skin colored papules on face   Comedones on back    The remainder of skin exam is normal       Eyes: Conjunctivae/lids:Normal     ENT: Lips: normal    MSK:Normal    Cardiovascular: peripheral edema none    Pulm: Breathing Normal    Neuro/Psych: Orientation:Alert and Orientedx3 ; Mood/Affect:normal   A/P:  1. Actinic keratosis on right cheek x 1  LN2:  Treated with LN2 for 5s for 1-2 cycles. Warned risks of blistering, pain, pigment change, scarring, and incomplete resolution.  Advised patient to return if lesions do not completely resolve.  Wound care sheet given.  2. Inflamed seborrheic keratosis on left temple x 1  LN2:  Treated with LN2 for 5s for 1-2 cycles. Warned risks of blistering, pain, pigment change, scarring, and incomplete resolution.  Advised patient to return if lesions do not completely resolve.  Wound care sheet given.  3. Seborrheic keratosis, lentigo, angioma, benign nevi, open comedones    It was a pleasure speaking to Hafsa Corona today.  BENIGN LESIONS DISCUSSED WITH PATIENT:  I discussed the specifics of tumor, prognosis, and genetics of benign lesions.  I explained that treatment of these lesions would be purely cosmetic and not medically neccessary.  I discussed with patient different removal options including excision, cautery and /or laser.      Nature and genetics of benign skin lesions dicussed with patient.  Signs and Symptoms of skin cancer discussed with patient.  ABCDEs of melanoma reviewed with patient.  Patient encouraged to perform monthly skin exams.  UV precautions reviewed with patient.  Risks of non-melanoma skin cancer discussed with patient   Return to clinic in one year or sooner if needed.

## 2023-01-25 ENCOUNTER — VIRTUAL VISIT (OUTPATIENT)
Dept: UROLOGY | Facility: CLINIC | Age: 68
End: 2023-01-25
Payer: MEDICARE

## 2023-01-25 DIAGNOSIS — N95.2 ATROPHIC VAGINITIS: ICD-10-CM

## 2023-01-25 DIAGNOSIS — R39.15 URINARY URGENCY: ICD-10-CM

## 2023-01-25 DIAGNOSIS — N39.3 STRESS INCONTINENCE: Primary | ICD-10-CM

## 2023-01-25 DIAGNOSIS — N39.41 URGE INCONTINENCE: ICD-10-CM

## 2023-01-25 PROCEDURE — 99215 OFFICE O/P EST HI 40 MIN: CPT | Mod: 95 | Performed by: UROLOGY

## 2023-01-25 NOTE — LETTER
1/25/2023       RE: Hafsa Corona  [address]  [place] [address]     Dear Colleague,    Thank you for referring your patient, Hafsa Corona, to the [facility] [place] at [facility]. Please see a copy of my visit note below.    Hafsa is a 67 year old who is being evaluated via a billable video visit.      How would you like to obtain your AVS? MyChart  If the video visit is dropped, the invitation should be resent by: Text to cell phone: [phone]  Will anyone else be joining your video visit? No        Video-Visit Details    Type of service:  Video Visit     Originating Location (pt. Location): Home    Distant Location (provider location):  Off-site  Platform used for Video Visit: [employer]    HPI:  Hafsa Corona is a 67 year old female with stress and urge incontinence.  She had hx of ovarian cancer and is s/p hysterectomy and bilateral oophorectomy.  This was in 2017.  She then had a Nx in 2018 for renal mass but it was not cancerous.    Reviewed previous notes from Chandrika Byers from 11/15/22    Exam:  LMP 01/01/2009   GENERAL: Healthy, alert and no distress  EYES: Eyes grossly normal to inspection.  No discharge or erythema, or obvious scleral/conjunctival abnormalities.  RESP: No audible wheeze, cough, or visible cyanosis.  No visible retractions or increased work of breathing.    SKIN: Visible skin clear. No significant rash, abnormal pigmentation or lesions.  NEURO: Cranial nerves grossly intact.  Mentation and speech appropriate for age.  PSYCH: Mentation appears normal, affect normal/bright, judgement and insight intact, normal speech and appearance well-groomed.    Review of Imaging:  The following imaging exams were reviewed by me and discussed with patient:  11/14/22Renal/Bladder Ultrasound: Normal  IMPRESSION:  Normal sonographic evaluation of the left kidney.    Review of Labs:  The following labs were reviewed by  me and discussed with the patient:  Lab Results   Component Value Date    WBC 6.8 11/14/2022    WBC 6.7 01/14/2020     Lab Results   Component Value Date    RBC 4.29 11/14/2022    RBC 3.96 01/14/2020     Lab Results   Component Value Date    HGB 11.8 11/14/2022    HGB 11.1 01/14/2020     Lab Results   Component Value Date    HCT 37.8 11/14/2022    HCT 35.2 01/14/2020     Lab Results   Component Value Date    MCV 88 11/14/2022    MCV 89 01/14/2020     Lab Results   Component Value Date    MCH 27.5 11/14/2022    MCH 28.0 01/14/2020     Lab Results   Component Value Date    MCHC 31.2 11/14/2022    MCHC 31.5 01/14/2020     Lab Results   Component Value Date    RDW 14.0 11/14/2022    RDW 13.5 01/14/2020     Lab Results   Component Value Date     11/14/2022     01/14/2020        Last Comprehensive Metabolic Panel:  Sodium   Date Value Ref Range Status   08/18/2022 142 136 - 145 mmol/L Final   06/28/2021 143 133 - 144 mmol/L Final     Potassium   Date Value Ref Range Status   08/18/2022 3.7 3.4 - 5.3 mmol/L Final   07/06/2022 4.1 3.4 - 5.3 mmol/L Final   06/28/2021 4.4 3.4 - 5.3 mmol/L Final     Chloride   Date Value Ref Range Status   08/18/2022 107 98 - 107 mmol/L Final   07/06/2022 107 94 - 109 mmol/L Final   06/28/2021 109 94 - 109 mmol/L Final     Carbon Dioxide   Date Value Ref Range Status   06/28/2021 28 20 - 32 mmol/L Final     Carbon Dioxide (CO2)   Date Value Ref Range Status   08/18/2022 22 22 - 29 mmol/L Final   07/06/2022 23 20 - 32 mmol/L Final     Anion Gap   Date Value Ref Range Status   08/18/2022 13 7 - 15 mmol/L Final   07/06/2022 9 3 - 14 mmol/L Final   06/28/2021 6 3 - 14 mmol/L Final     Glucose   Date Value Ref Range Status   08/18/2022 151 (H) 70 - 99 mg/dL Final   07/06/2022 127 (H) 70 - 99 mg/dL Final   06/28/2021 96 70 - 99 mg/dL Final     Urea Nitrogen   Date Value Ref Range Status   08/18/2022 19.7 8.0 - 23.0 mg/dL Final   07/06/2022 28 7 - 30 mg/dL Final   06/28/2021 25 7 - 30  mg/dL Final     Creatinine   Date Value Ref Range Status   08/18/2022 1.10 (H) 0.51 - 0.95 mg/dL Final   06/28/2021 1.20 (H) 0.52 - 1.04 mg/dL Final     GFR Estimate   Date Value Ref Range Status   08/18/2022 54 (L) >60 mL/min/1.73m2 Final     Comment:     Effective December 21, 2021 eGFRcr in adults is calculated using the 2021 CKD-EPI creatinine equation which includes age and gender (Eduard et al., NE, DOI: 10.1056/EYQOqu9377535)   06/28/2021 47 (L) >60 mL/min/[1.73_m2] Final     Comment:     Non  GFR Calc  Starting 12/18/2018, serum creatinine based estimated GFR (eGFR) will be   calculated using the Chronic Kidney Disease Epidemiology Collaboration   (CKD-EPI) equation.       GFR, ESTIMATED POCT   Date Value Ref Range Status   11/15/2021 43 (L) >60 mL/min/1.73m2 Final     Calcium   Date Value Ref Range Status   08/18/2022 9.1 8.8 - 10.2 mg/dL Final   06/28/2021 9.1 8.5 - 10.1 mg/dL Final     Bilirubin Total   Date Value Ref Range Status   07/06/2022 0.4 0.2 - 1.3 mg/dL Final   01/14/2020 0.3 0.2 - 1.3 mg/dL Final     Alkaline Phosphatase   Date Value Ref Range Status   07/06/2022 78 40 - 150 U/L Final   01/14/2020 71 40 - 150 U/L Final     ALT   Date Value Ref Range Status   07/06/2022 17 0 - 50 U/L Final   01/14/2020 18 0 - 50 U/L Final     AST   Date Value Ref Range Status   07/06/2022 23 0 - 45 U/L Final   01/14/2020 12 0 - 45 U/L Final                Color Urine (no units)   Date Value   11/15/2022 Yellow   06/25/2021 Yellow     Appearance Urine (no units)   Date Value   11/15/2022 Clear   06/25/2021 Slightly Cloudy     Glucose Urine (mg/dL)   Date Value   11/15/2022 Negative   06/25/2021 Negative     Bilirubin Urine (no units)   Date Value   11/15/2022 Negative   06/25/2021 Negative     Ketones Urine (mg/dL)   Date Value   11/15/2022 Negative   06/25/2021 Negative     Specific Gravity Urine (no units)   Date Value   11/15/2022 1.025   06/25/2021 1.018     pH Urine   Date Value   11/15/2022  6.0   06/25/2021 5.0 pH     Protein Albumin Urine (mg/dL)   Date Value   11/15/2022 Negative   06/25/2021 Negative     Urobilinogen Urine   Date Value   07/29/2022 0.2 E.U./dL   02/07/2020 0.2 EU/dL     Nitrite Urine (no units)   Date Value   11/15/2022 Negative   06/25/2021 Negative     Leukocyte Esterase Urine (no units)   Date Value   11/15/2022 Negative   06/25/2021 Negative          Assessment & Plan   66 y/o female with urinary incontinence both urge and stress.  She seems to be more bothered by the stress incontinence.  We discussed different options including observation, continued PFPT, midurethral sling for the stress urinary incontinence as well as medication for the urgency.  We also discussed PTNS, [employer] SNS, and botox as future.  -send information about the [employer] solyx sling as well as PTNS  -the patient will check with oncologist if she can be on vaginal estrogen cream.  -pt. Would like to think about it and will get back to me.    Marc Fregoso MD  [facility] [place]      ==========================      Additional Coding Information:    Time spent:  46 minutes spent on the date of the encounter doing chart review, history and exam, documentation and further activities per the note

## 2023-01-25 NOTE — PROGRESS NOTES
Hafsa is a 67 year old who is being evaluated via a billable video visit.      How would you like to obtain your AVS? [employer]  If the video visit is dropped, the invitation should be resent by: Text to cell phone: [phone]  Will anyone else be joining your video visit? No        Video-Visit Details    Type of service:  Video Visit     Originating Location (pt. Location): Home    Distant Location (provider location):  Off-site  Platform used for Video Visit: [employer]

## 2023-01-25 NOTE — PROGRESS NOTES
HPI:  Hafsa Corona is a 67 year old female with stress and urge incontinence.  She had hx of ovarian cancer and is s/p hysterectomy and bilateral oophorectomy.  This was in 2017.  She then had a Nx in 2018 for renal mass but it was not cancerous.    Reviewed previous notes from Chandrika Byers from 11/15/22    Exam:  LMP 01/01/2009   GENERAL: Healthy, alert and no distress  EYES: Eyes grossly normal to inspection.  No discharge or erythema, or obvious scleral/conjunctival abnormalities.  RESP: No audible wheeze, cough, or visible cyanosis.  No visible retractions or increased work of breathing.    SKIN: Visible skin clear. No significant rash, abnormal pigmentation or lesions.  NEURO: Cranial nerves grossly intact.  Mentation and speech appropriate for age.  PSYCH: Mentation appears normal, affect normal/bright, judgement and insight intact, normal speech and appearance well-groomed.    Review of Imaging:  The following imaging exams were reviewed by me and discussed with patient:  11/14/22Renal/Bladder Ultrasound: Normal  IMPRESSION:  Normal sonographic evaluation of the left kidney.    Review of Labs:  The following labs were reviewed by me and discussed with the patient:  Lab Results   Component Value Date    WBC 6.8 11/14/2022    WBC 6.7 01/14/2020     Lab Results   Component Value Date    RBC 4.29 11/14/2022    RBC 3.96 01/14/2020     Lab Results   Component Value Date    HGB 11.8 11/14/2022    HGB 11.1 01/14/2020     Lab Results   Component Value Date    HCT 37.8 11/14/2022    HCT 35.2 01/14/2020     Lab Results   Component Value Date    MCV 88 11/14/2022    MCV 89 01/14/2020     Lab Results   Component Value Date    MCH 27.5 11/14/2022    MCH 28.0 01/14/2020     Lab Results   Component Value Date    MCHC 31.2 11/14/2022    MCHC 31.5 01/14/2020     Lab Results   Component Value Date    RDW 14.0 11/14/2022    RDW 13.5 01/14/2020     Lab Results   Component Value Date     11/14/2022     01/14/2020         Last Comprehensive Metabolic Panel:  Sodium   Date Value Ref Range Status   08/18/2022 142 136 - 145 mmol/L Final   06/28/2021 143 133 - 144 mmol/L Final     Potassium   Date Value Ref Range Status   08/18/2022 3.7 3.4 - 5.3 mmol/L Final   07/06/2022 4.1 3.4 - 5.3 mmol/L Final   06/28/2021 4.4 3.4 - 5.3 mmol/L Final     Chloride   Date Value Ref Range Status   08/18/2022 107 98 - 107 mmol/L Final   07/06/2022 107 94 - 109 mmol/L Final   06/28/2021 109 94 - 109 mmol/L Final     Carbon Dioxide   Date Value Ref Range Status   06/28/2021 28 20 - 32 mmol/L Final     Carbon Dioxide (CO2)   Date Value Ref Range Status   08/18/2022 22 22 - 29 mmol/L Final   07/06/2022 23 20 - 32 mmol/L Final     Anion Gap   Date Value Ref Range Status   08/18/2022 13 7 - 15 mmol/L Final   07/06/2022 9 3 - 14 mmol/L Final   06/28/2021 6 3 - 14 mmol/L Final     Glucose   Date Value Ref Range Status   08/18/2022 151 (H) 70 - 99 mg/dL Final   07/06/2022 127 (H) 70 - 99 mg/dL Final   06/28/2021 96 70 - 99 mg/dL Final     Urea Nitrogen   Date Value Ref Range Status   08/18/2022 19.7 8.0 - 23.0 mg/dL Final   07/06/2022 28 7 - 30 mg/dL Final   06/28/2021 25 7 - 30 mg/dL Final     Creatinine   Date Value Ref Range Status   08/18/2022 1.10 (H) 0.51 - 0.95 mg/dL Final   06/28/2021 1.20 (H) 0.52 - 1.04 mg/dL Final     GFR Estimate   Date Value Ref Range Status   08/18/2022 54 (L) >60 mL/min/1.73m2 Final     Comment:     Effective December 21, 2021 eGFRcr in adults is calculated using the 2021 CKD-EPI creatinine equation which includes age and gender (Eduard et al., NEJM, DOI: 10.1056/JGGAdm3948283)   06/28/2021 47 (L) >60 mL/min/[1.73_m2] Final     Comment:     Non  GFR Calc  Starting 12/18/2018, serum creatinine based estimated GFR (eGFR) will be   calculated using the Chronic Kidney Disease Epidemiology Collaboration   (CKD-EPI) equation.       GFR, ESTIMATED POCT   Date Value Ref Range Status   11/15/2021 43 (L) >60  mL/min/1.73m2 Final     Calcium   Date Value Ref Range Status   08/18/2022 9.1 8.8 - 10.2 mg/dL Final   06/28/2021 9.1 8.5 - 10.1 mg/dL Final     Bilirubin Total   Date Value Ref Range Status   07/06/2022 0.4 0.2 - 1.3 mg/dL Final   01/14/2020 0.3 0.2 - 1.3 mg/dL Final     Alkaline Phosphatase   Date Value Ref Range Status   07/06/2022 78 40 - 150 U/L Final   01/14/2020 71 40 - 150 U/L Final     ALT   Date Value Ref Range Status   07/06/2022 17 0 - 50 U/L Final   01/14/2020 18 0 - 50 U/L Final     AST   Date Value Ref Range Status   07/06/2022 23 0 - 45 U/L Final   01/14/2020 12 0 - 45 U/L Final                Color Urine (no units)   Date Value   11/15/2022 Yellow   06/25/2021 Yellow     Appearance Urine (no units)   Date Value   11/15/2022 Clear   06/25/2021 Slightly Cloudy     Glucose Urine (mg/dL)   Date Value   11/15/2022 Negative   06/25/2021 Negative     Bilirubin Urine (no units)   Date Value   11/15/2022 Negative   06/25/2021 Negative     Ketones Urine (mg/dL)   Date Value   11/15/2022 Negative   06/25/2021 Negative     Specific Gravity Urine (no units)   Date Value   11/15/2022 1.025   06/25/2021 1.018     pH Urine   Date Value   11/15/2022 6.0   06/25/2021 5.0 pH     Protein Albumin Urine (mg/dL)   Date Value   11/15/2022 Negative   06/25/2021 Negative     Urobilinogen Urine   Date Value   07/29/2022 0.2 E.U./dL   02/07/2020 0.2 EU/dL     Nitrite Urine (no units)   Date Value   11/15/2022 Negative   06/25/2021 Negative     Leukocyte Esterase Urine (no units)   Date Value   11/15/2022 Negative   06/25/2021 Negative          Assessment & Plan   68 y/o female with urinary incontinence both urge and stress.  She seems to be more bothered by the stress incontinence.  We discussed different options including observation, continued PFPT, midurethral sling for the stress urinary incontinence as well as medication for the urgency.  We also discussed PTNS, [employer] SNS, and botox as future.  -send information about  the [employer] scientific solyx sling as well as PTNS  -the patient will check with oncologist if she can be on vaginal estrogen cream.  -pt. Would like to think about it and will get back to me.    Marc Fregoso MD  [facility] [place]      ==========================      Additional Coding Information:    Time spent:  46 minutes spent on the date of the encounter doing chart review, history and exam, documentation and further activities per the note

## 2023-01-25 NOTE — PATIENT INSTRUCTIONS
-send information about the [employer] scientific solyx sling as well as PTNS  -the patient will check with oncologist if she can be on vaginal estrogen cream.  -pt. Would like to think about it and will get back to me.

## 2023-02-18 ENCOUNTER — HEALTH MAINTENANCE LETTER (OUTPATIENT)
Age: 68
End: 2023-02-18

## 2023-03-27 ENCOUNTER — MYC MEDICAL ADVICE (OUTPATIENT)
Dept: FAMILY MEDICINE | Facility: CLINIC | Age: 68
End: 2023-03-27
Payer: MEDICARE

## 2023-03-27 ENCOUNTER — TELEPHONE (OUTPATIENT)
Dept: FAMILY MEDICINE | Facility: CLINIC | Age: 68
End: 2023-03-27
Payer: MEDICARE

## 2023-03-27 DIAGNOSIS — F33.0 MAJOR DEPRESSIVE DISORDER, RECURRENT EPISODE, MILD (H): ICD-10-CM

## 2023-03-27 RX ORDER — ESCITALOPRAM OXALATE 20 MG/1
20 TABLET ORAL DAILY
Qty: 90 TABLET | Refills: 0 | Status: SHIPPED | OUTPATIENT
Start: 2023-03-27

## 2023-03-27 NOTE — TELEPHONE ENCOUNTER
Call received from patient:  1. Is out of Escitalopram and needs medication refilled today; [facility] does not have order from 12/23/22.  Writer resent order.    Patient verbalized understanding and in agreement with plan.      Warnings Override History for traZODone (DESYREL) 50 MG tablet [[id number]]    Overridden by Anne-Marie Shafer RN on Dec 23, 2022 7:21 AM   Drug-Drug   1. SELECTIVE SEROTONIN REUPTAKE INHIBITORS / SEROTONERGIC NON-OPIOID CNS DEPRESSANTS [Level: Major]   Other Orders: escitalopram (LEXAPRO) 20 MG tablet         Overridden by Mary Jo Flowers, RN on Sep 22, 2022 8:38 AM   Drug-Drug   1. SELECTIVE SEROTONIN REUPTAKE INHIBITORS / SEROTONERGIC NON-OPIOID CNS DEPRESSANTS [Level: Major] [Reason: Will monitor drug levels/drug effects ]   Other Orders: escitalopram (LEXAPRO) 20 MG tablet         Overridden by Morelia Ayon NP on Jul 6, 2022 10:49 AM   Drug-Drug   1. SELECTIVE SEROTONIN REUPTAKE INHIBITORS / SEROTONERGIC NON-OPIOID CNS DEPRESSANTS [Level: Major]   Other Orders: escitalopram (LEXAPRO) 20 MG tablet        FRAN Wood, RN-[facility]

## 2023-03-28 NOTE — TELEPHONE ENCOUNTER
See 3/27/23 telephone encounter.  TIMUR WoodN, RN-BC  [facility]

## 2023-04-13 ENCOUNTER — ANCILLARY PROCEDURE (OUTPATIENT)
Dept: MRI IMAGING | Facility: CLINIC | Age: 68
End: 2023-04-13
Attending: CLINICAL NURSE SPECIALIST
Payer: MEDICARE

## 2023-04-13 DIAGNOSIS — Z80.3 FAMILY HISTORY OF MALIGNANT NEOPLASM OF BREAST: ICD-10-CM

## 2023-04-13 DIAGNOSIS — Z12.39 BREAST CANCER SCREENING, HIGH RISK PATIENT: ICD-10-CM

## 2023-04-13 DIAGNOSIS — R92.30 DENSE BREAST: ICD-10-CM

## 2023-04-13 PROCEDURE — A9585 GADOBUTROL INJECTION: HCPCS | Performed by: STUDENT IN AN ORGANIZED HEALTH CARE EDUCATION/TRAINING PROGRAM

## 2023-04-13 PROCEDURE — G1010 CDSM STANSON: HCPCS | Performed by: STUDENT IN AN ORGANIZED HEALTH CARE EDUCATION/TRAINING PROGRAM

## 2023-04-13 PROCEDURE — 77049 MRI BREAST C-+ W/CAD BI: CPT | Mod: MG | Performed by: STUDENT IN AN ORGANIZED HEALTH CARE EDUCATION/TRAINING PROGRAM

## 2023-04-13 RX ORDER — GADOBUTROL 604.72 MG/ML
10 INJECTION INTRAVENOUS ONCE
Status: COMPLETED | OUTPATIENT
Start: 2023-04-13 | End: 2023-04-13

## 2023-04-13 RX ADMIN — GADOBUTROL 10 ML: 604.72 INJECTION INTRAVENOUS at 15:08

## 2023-05-22 ENCOUNTER — OFFICE VISIT (OUTPATIENT)
Dept: URGENT CARE | Facility: URGENT CARE | Age: 68
End: 2023-05-22
Payer: MEDICARE

## 2023-05-22 VITALS
HEART RATE: 113 BPM | RESPIRATION RATE: 20 BRPM | OXYGEN SATURATION: 98 % | SYSTOLIC BLOOD PRESSURE: 152 MMHG | TEMPERATURE: 98 F | DIASTOLIC BLOOD PRESSURE: 84 MMHG

## 2023-05-22 VITALS
SYSTOLIC BLOOD PRESSURE: 122 MMHG | RESPIRATION RATE: 16 BRPM | BODY MASS INDEX: 36.32 KG/M2 | OXYGEN SATURATION: 96 % | TEMPERATURE: 97.5 F | HEART RATE: 88 BPM | WEIGHT: 205 LBS | DIASTOLIC BLOOD PRESSURE: 84 MMHG | HEIGHT: 63 IN

## 2023-05-22 DIAGNOSIS — M79.652 PAIN OF LEFT THIGH: Primary | ICD-10-CM

## 2023-05-22 DIAGNOSIS — M79.662 PAIN OF LEFT LOWER LEG: ICD-10-CM

## 2023-05-22 PROCEDURE — 99207 PR NO BILLABLE SERVICE THIS VISIT: CPT | Performed by: PHYSICIAN ASSISTANT

## 2023-05-22 PROCEDURE — 99284 EMERGENCY DEPT VISIT MOD MDM: CPT | Mod: 25

## 2023-05-22 RX ORDER — DEXTROAMPHETAMINE SACCHARATE, AMPHETAMINE ASPARTATE MONOHYDRATE, DEXTROAMPHETAMINE SULFATE AND AMPHETAMINE SULFATE 5; 5; 5; 5 MG/1; MG/1; MG/1; MG/1
20 CAPSULE, EXTENDED RELEASE ORAL DAILY
COMMUNITY

## 2023-05-23 ENCOUNTER — HOSPITAL ENCOUNTER (EMERGENCY)
Facility: CLINIC | Age: 68
Discharge: HOME | End: 2023-05-23
Attending: EMERGENCY MEDICINE | Admitting: EMERGENCY MEDICINE
Payer: MEDICARE

## 2023-05-23 ENCOUNTER — APPOINTMENT (OUTPATIENT)
Dept: ULTRASOUND IMAGING | Facility: CLINIC | Age: 68
End: 2023-05-23
Attending: EMERGENCY MEDICINE
Payer: MEDICARE

## 2023-05-23 DIAGNOSIS — I80.02 THROMBOPHLEBITIS OF SUPERFICIAL VEINS OF LEFT LOWER EXTREMITY: ICD-10-CM

## 2023-05-23 PROCEDURE — 250N000013 HC RX MED GY IP 250 OP 250 PS 637: Performed by: EMERGENCY MEDICINE

## 2023-05-23 PROCEDURE — 93971 EXTREMITY STUDY: CPT | Mod: LT

## 2023-05-23 RX ADMIN — RIVAROXABAN 15 MG: 15 TABLET, FILM COATED ORAL at 01:57

## 2023-05-23 ASSESSMENT — ACTIVITIES OF DAILY LIVING (ADL): ADLS_ACUITY_SCORE: 35

## 2023-05-23 NOTE — ED TRIAGE NOTES
Pt presents with 1 week of upper L leg swelling and redness. Hx of DVT, was on Xarelto until 1 month ago.      Triage Assessment     Row Name 05/22/23 4170       Triage Assessment (Adult)    Airway WDL WDL       Respiratory WDL    Respiratory WDL WDL       Skin Circulation/Temperature WDL    Skin Circulation/Temperature WDL WDL       Cardiac WDL    Cardiac WDL WDL       Peripheral/Neurovascular WDL    Peripheral Neurovascular WDL WDL       Cognitive/Neuro/Behavioral WDL    Cognitive/Neuro/Behavioral WDL WDL

## 2023-05-23 NOTE — ED PROVIDER NOTES
History     Chief Complaint:  Leg Swelling       HPI   Hafsa Corona is a 68 year old female with a history of diabetes mellitus, heart disease, and tachycardia who presents for left leg swelling and redness. She says that her leg has been swollen for a while, but the redness appeared 6-7 days ago. The redness started along her varicose vein in the left lower. She says that the area is highly sensitive. Patient denies chest pain, shortness of breath, or fever. She says that she has been unable to afford her Xarelto and stopped taking it one month ago.  She has no further complaints currently.      Independent Historian:   None - Patient Only    Review of External Notes: Reviewed Urgent Care note from 5/22/23      Medications:    Rivaroxaban   Amphetamine-dextroamphetamine   Atorvastatin   Diphenhydramine   Escitalopram   Lorazepam  Magnesium Hydroxide  Metformin   Macrobid  Tamoxifen  Trazodone     Past Medical History:    Anxiety   Arthritis   ADD  Cancer   Chronic rhinitis   Depressive disorder  Heart disease  Blood transfusion  Hypertension   Panic disorder without agoraphobia  Hypercholesterolemia  Tachycardia   Diabetes mellitus     Past Surgical History:    Right kidney removal  Breast cyst removal  Bilateral cataracts   Endometrial ablation   Excision mass, back  Hysterectomy total abdominal, bilateral salpingo-oophorectomy, node dissection, combined   Lymphadenectomy retroperitoneal  Omentectomy    Right eye capsulotomy      Physical Exam     Patient Vitals for the past 24 hrs:   BP Temp Temp src Pulse Resp SpO2   05/22/23 2252 (!) 152/84 -- -- -- -- --   05/22/23 2250 (!) 146/65 98  F (36.7  C) Oral 113 20 98 %        Physical Exam  Nursing note and vitals reviewed.  Constitutional:  Oriented to person, place, and time. Cooperative.   HENT:   Nose:    Nose normal.   Mouth/Throat:   Mucous membranes are normal.   Eyes:    Conjunctivae normal and EOM are normal.      Pupils are equal, round, and reactive to  light.   Neck:    Trachea normal.   Cardiovascular:  Normal rate, regular rhythm, normal heart sounds and normal pulses. No murmur heard.  Pulmonary/Chest:  Effort normal and breath sounds normal.   Abdominal:   Soft. Normal appearance and bowel sounds are normal.      There is no tenderness.      There is no rebound and no CVA tenderness.   Musculoskeletal:  There is an area of erythema, swelling, and hardness to the medial aspect of the left lower leg, along with an area of erythema and swelling and hardness to the medial left mid thigh region.  The area is slightly warm to palpation.  Extremities atraumatic x 4.   Lymphadenopathy:  No cervical adenopathy.   Neurological:   Alert and oriented to person, place, and time. Normal strength.      No cranial nerve deficit or sensory deficit. GCS eye subscore is 4. GCS verbal subscore is 5. GCS motor subscore is 6.   Skin:    Skin is intact.   Psychiatric:   Normal mood and affect.      Emergency Department Course     Imaging:  US Lower Extremity Venous Duplex Left   Final Result   IMPRESSION:   1.  No deep venous thrombosis in the left lower extremity.   2.  Superficial thrombophlebitis of greater saphenous vein.         Report per radiology    Emergency Department Course & Assessments:      Medications given:  rivaroxaban ANTICOAGULANT (XARELTO) tablet 15 mg   15 mg, Oral, ONCE, On Tue 5/23/23 at 0150, For 1 dose, Indications: DVT-PE Treatment, Given at 5/23/2023 0157, Ordered by Napoleon Cabrera MD on 5/23/2023 at 0147    Assessments:  0042 I obtained history and examined the patient as noted above.   0137 I rechecked and updated the patient. At this point I feel that the patient is safe for discharge, and the patient agrees.     Independent Interpretation (X-rays, CTs, rhythm strip):  None    Consultations/Discussion of Management or Tests:  None        Social Determinants of Health affecting care:   Patient has been unable to afford her Xarelto      Disposition:  The patient was discharged to home.     Impression & Plan      Medical Decision Making:  This is a 68-year-old female who came in for further evaluation of left leg swelling and redness.  Given her history, I was concerned for DVT, but I also considered the possibility of superficial thrombophlebitis, as well as cellulitis.  She had the above ultrasound obtained, which shows superficial thrombophlebitis, however it is long enough that she still requires anticoagulation per the guidelines.  Therefore she was provided her first oral dose of Xarelto here.  I recommended she also use warm, wet compresses to the area.  I am providing her a prescription for Xarelto as well, and I also provided her a discount card that hopefully will help her at least initially to afford the prescription.  I recommended she follow-up with her physicians to discuss this as well, as previously she was diagnosed with cancer shortly after her first DVT diagnosis.  She understands though that this technically is not a DVT but rather a superficial thrombophlebitis and therefore may not be related to recurrent cancer.  Regardless, I stressed the importance of close outpatient follow-up and certainly returning here with any concerns or worsening symptoms.    Diagnosis:    ICD-10-CM    1. Thrombophlebitis of superficial veins of left lower extremity  I80.02            Discharge Medications:  Discharge Medication List as of 5/23/2023  1:55 AM      START taking these medications    Details   Rivaroxaban ANTICOAGULANT 15 & 20 MG TBPK Starter Therapy Pack Take 15 mg by mouth 2 times daily (with meals) for 21 days, THEN 20 mg daily with food for 9 days., Disp-51 each, R-0, Local Print              Scribe Disclosure:  I, Ely Espinosa, am serving as a scribe at 1:49 AM on 5/23/2023 to document services personally performed by Napoleon Cabrera MD based on my observations and the provider's statements to me.     5/23/2023    Napoleon Cabrera MD Lashkowitz, Seth H, MD  05/23/23 0750

## 2023-05-23 NOTE — PROGRESS NOTES
SUBJECTIVE:   Hafsa Corona is a 68 year old female presenting with a chief complaint of   Chief Complaint   Patient presents with     Urgent Care     Musculoskeletal Problem     Left leg pain and some bulging about 1 week ago and now some pain in inner left thigh. Has had 2 small blood clots before       She is an established patient of [facility].   Patient presents with concerns of left leg blood clot.  Left leg pain for one week, which she started baby ASA at that time.    Has had 2 clots in the past.  Patient refuses ED and wants an US order only.   Patient states pain and a bump.        Review of Systems   All other systems reviewed and are negative.      Past Medical History:   Diagnosis Date     Anxiety state, unspecified     [id number]     Arthritis 2014    vague, gradual, not treated really, knees feel it     At high risk for breast cancer 09/27/2019    30.3% lifetime risk by SHERON model     Attention deficit disorder without mention of hyperactivity      Cancer (H) 7/2017 and 1/2018    ovarian and kidney cancers, both treated well     Chronic rhinitis      Depressive disorder lifetime    plus Anxiety plus ADD. Escitalipram, therapy, ADD meds maybe     Heart disease 1999    tachycardia and high cholesterol, managed     History of blood transfusion 07/2017    while in hospital for ovarian cancer     Hypertension 1999    tho BP was too LOW last week. past 12 years Generally OK     Panic disorder without agoraphobia      Pure hypercholesterolemia     Lipitor     Tachycardia, unspecified     9/1999-2/2004     Type II or unspecified type diabetes mellitus without mention of complication, not stated as uncontrolled     diagnosed 2004     Family History   Problem Relation Age of Onset     C.A.D. Mother      Hypertension Mother      Breast Cancer Mother      Psychotic Disorder Mother      Colon Cancer Mother      Cancer Mother         Bone cancer     Coronary Artery Disease Mother      Hyperlipidemia Mother          treated w. statins     Other Cancer Mother         bone/marrow, ,  of this     Depression Mother      Anxiety Disorder Mother      Mental Illness Mother         various undiagnosed types, but THERE     Obesity Mother      Bone Cancer Mother      Other - See Comments Mother         psychotic disease      C.A.D. Father      Diabetes Father      Hypertension Father      Cerebrovascular Disease Father         1984 or      Psychotic Disorder Father      Pancreatic Cancer Father      Coronary Artery Disease Father      Hyperlipidemia Father         treated w statins     Other Cancer Father         pancreatic, ,  of this     Depression Father      Mental Illness Father         likely PTSD and depression     Obesity Father      Other - See Comments Father         cerebrovascular disease, psychotic disease      Psychotic Disorder Sister         x2     Neurologic Disorder Sister      Hypertension Sister      Hyperlipidemia Sister         treated w statins     Depression Sister      Anxiety Disorder Sister      Obesity Sister      Rashes/Skin Problems Sister         precancerous lesion on leg     Psychotic Disorder Sister      Other - See Comments Sister         small kidney stone      Hypertension Sister      Hyperlipidemia Sister         treated w statins     Depression Sister      Anxiety Disorder Sister      Other - See Comments Sister         psychotic disease      Prostate Problems Brother         cleared      Hypertension Brother      Hyperlipidemia Brother         unsure if treated w statins     Depression Brother      Anxiety Disorder Brother      Mental Illness Brother         undiagnosed but THERE     Obesity Brother      Pacemaker Brother 70        bradycardia, sick sinus syndrome     Psychotic Disorder Brother         x2     Depression Brother         major depressive disorder and OCD     Anxiety Disorder Brother      Mental Illness Brother         not sure of diagnoses, treated      Hypertension Brother      Hyperlipidemia Brother      Prostate Cancer Brother      Depression Brother      Anxiety Disorder Brother      Other - See Comments Brother         psychotic disease      Psychotic Disorder Maternal Grandmother         ?     Coronary Artery Disease Maternal Grandmother          of heart attack age 84?     Depression Maternal Grandmother      Psychotic Disorder Maternal Grandfather         ?     Psychotic Disorder Paternal Grandmother         Schizophernia     Coronary Artery Disease Paternal Grandmother          of heart attack, age 85?     Depression Paternal Grandmother         severe, but recovered     Mental Illness Paternal Grandmother         possible bipolar or other     Schizophrenia Paternal Grandmother      Psychotic Disorder Paternal Grandfather         ?     Respiratory Paternal Grandfather          of emphysema and smoking     Emphysema Paternal Grandfather      Cancer - colorectal No family hx of      Glaucoma No family hx of      Macular Degeneration No family hx of      Current Outpatient Medications   Medication Sig Dispense Refill     Acetaminophen (TYLENOL PO) Take 500 mg by mouth 2 tabs prn pain (monthly)       amphetamine-dextroamphetamine (ADDERALL XR) 20 MG 24 hr capsule Take 20 mg by mouth daily       atorvastatin (LIPITOR) 80 MG tablet TAKE 1 TABLET(80 MG) BY MOUTH DAILY 90 tablet 3     B Complex Vitamins (VITAMIN B-COMPLEX PO) Take 50 mg by mouth        CALCIUM-MAGNESIUM-VITAMIN D PO Take 2 tablets by mouth daily       escitalopram (LEXAPRO) 20 MG tablet Take 1 tablet (20 mg) by mouth daily 90 tablet 0     fluticasone (FLONASE) 50 MCG/ACT nasal spray SHAKE LIQUID AND USE 2 SPRAYS IN EACH NOSTRIL DAILY 48 g 3     LORazepam (ATIVAN) 1 MG tablet Take 1 tablet (1 mg) by mouth every 6 hours as needed (nausea/vomiting, anxiety or sleep) 30 tablet 0     Magnesium Hydroxide (MAGNESIA PO) Take 500 mg by mouth       metFORMIN (GLUCOPHAGE XR) 500 MG 24 hr tablet  "TAKE 2 TABLETS BY MOUTH EVERY DAY WITH THE EVENING MEAL 180 tablet 3     Multiple Vitamins-Minerals (MULTIVITAMIN ADULT PO) Take 1 tablet by mouth daily       Probiotic Product (PRO-BIOTIC BLEND PO) Take 1 capsule by mouth daily Enzymatic Therapy-probiotic pearls       tamoxifen (NOLVADEX) 10 MG tablet Cut each pill in 1/2 with pill cutter to ensure 5 mg daily dose. 45 tablet 3     traZODone (DESYREL) 50 MG tablet TAKE 1 TABLET  BY MOUTH EVERY NIGHT AS NEEDED FOR SLEEP 180 tablet PRN     ASPIRIN NOT PRESCRIBED (INTENTIONAL) Please choose reason not prescribed from choices below.       cephALEXin (KEFLEX) 500 MG capsule Take 1 capsule (500 mg) by mouth 4 times daily (Patient not taking: Reported on 1/25/2023) 20 capsule 0     Cholecalciferol (VITAMIN D) 1000 UNIT capsule Take 2,000 Units by mouth daily        DiphenhydrAMINE HCl (BENADRYL PO) Take 50 mg by mouth daily as needed (monthly)       EPINEPHrine (ANY BX GENERIC EQUIV) 0.3 MG/0.3ML injection 2-pack Inject 0.3 mLs (0.3 mg) into the muscle once as needed for anaphylaxis 0.3 mL PRN     nitroFURantoin macrocrystal-monohydrate (MACROBID) 100 MG capsule TAKE 1 CAPSULE BY MOUTH AFTER INTERCOURSE AS NEEDED 30 capsule PRN     nystatin (MYCOSTATIN) 527252 UNIT/GM external powder Apply topically 2 times daily as needed Apply to folds under breast and abdomen. (Patient not taking: Reported on 1/25/2023) 60 g 1     rivaroxaban ANTICOAGULANT (XARELTO) 20 MG TABS tablet Take 1 tablet (20 mg) by mouth daily (with dinner) (Patient not taking: Reported on 5/22/2023) 30 tablet 11     Social History     Tobacco Use     Smoking status: Never     Smokeless tobacco: Never   Vaping Use     Vaping status: Never Used   Substance Use Topics     Alcohol use: Yes     Comment: Very infrequent, a bit of wine or beer 2x per month maybe       OBJECTIVE  /84   Pulse 88   Temp 97.5  F (36.4  C) (Temporal)   Resp 16   Ht 1.6 m (5' 3\")   Wt 93 kg (205 lb)   LMP 01/01/2009   SpO2 " 96%   BMI 36.31 kg/m      Physical Exam  No PE.  Patient sent to ED for evaluation and treatment, r/o DVT.  Patient left in stable condition.

## 2023-05-24 ENCOUNTER — VIRTUAL VISIT (OUTPATIENT)
Dept: HEMATOLOGY | Facility: CLINIC | Age: 68
End: 2023-05-24
Attending: INTERNAL MEDICINE
Payer: MEDICARE

## 2023-05-24 VITALS — BODY MASS INDEX: 36.31 KG/M2 | WEIGHT: 205 LBS

## 2023-05-24 DIAGNOSIS — Z86.718 PERSONAL HISTORY OF DVT (DEEP VEIN THROMBOSIS): ICD-10-CM

## 2023-05-24 DIAGNOSIS — I82.812 SUPERFICIAL THROMBOSIS OF LEFT LOWER EXTREMITY: Primary | ICD-10-CM

## 2023-05-24 PROCEDURE — 99213 OFFICE O/P EST LOW 20 MIN: CPT | Mod: 95 | Performed by: INTERNAL MEDICINE

## 2023-05-24 NOTE — PROGRESS NOTES
[facility]  [address] [place] [address], [facility] [address], [address], [place] [address]  Phone: [phone], Fax: [phone].      Outpatient Visit Note:    Patient: Hafsa Corona  MRN: [id number]  : 1955  CLIFF: May 24, 2023      Reason for Visit:  Follow up for distal DVT, cancer-associated, and recent thrombophlebitis    Assessment:  In summary, Hafsa Corona is a 68 year old woman with a history of cancer-associated distal DVT, now in remission more than 5 years after treatment with surgery and chemotherapy who had stopped prophylaxis, but shortly thereafter had an episode of superficial thrombophlebitis, which was unprovoked.      Plan:  1. Majority of today's visit was spent counseling the patient about her recurrent clot, this time superficial thrombophlebitis.  2. Continue rivaroxaban 20mg daily for treatment for 3 weeks and then continue for secondary prophylaxis.  3.  Given her concerns about prior cancer provoking VTE, I agree with her that it would be reasonable to follow-up with gynecologic oncology for further discussion.  I think he would be very reasonable to consider CA-125 screening and possibly even abdominal imaging but I will leave that to their discretion.  4. For interruption of anticoagulation, she should stop therapy 1 day prior to the minor procedure or 2 days before a major surgical procedure.  She may resume anticoagulation at discussion of her surgeon.  5. RTC 1 year and call with questions or concerns in the meantime      25 minutes spent by me on the date of the encounter doing chart review, review of test results, interpretation of tests, patient visit and documentation      William Villarreal MD   of Medicine  [facility]     -----------------------    Forward History:  Presented 2017 with LLE pain and swelling, found to have a distal DVT  2017 switched from warfarin to rivaroxaban, plan for 3  months of therapy and consider duration of therapy  July 2017 found to have new ovarian mass and underwent ex lap for resection of what was found to have stage 1C2 clear cell carcinoma.  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  She reports already that her thigh pain is improving.  She has no chest pain or shortness of breath.  No new swelling or redness.  She is tolerating rivaroxaban well.  Cost of previously been an issue but she has recently been able to get a 1 month free trial and then enroll on the $85 per month program through the drug company.      Objective:  Exam:  Constitutional: Appears well, no distress  Eyes: no discharge, injection or icterus  Respiratory: no cough or labored breathing  Skin: no rashes or petechiae  Neurological: no deficits appreciated, speech is fluent  Psych: affect is normal    Labs:   Latest Reference Range & Units 07/06/22 11:25   Creatinine 0.52 - 1.04 mg/dL 1.10 (H)   (H): Data is abnormally high    Imaging:  I reviewed her most recent ultrasound report which showed superficial thrombophlebitis.

## 2023-05-30 DIAGNOSIS — C56.2 OVARIAN CANCER, LEFT (H): Primary | ICD-10-CM

## 2023-05-31 NOTE — PROGRESS NOTES
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 She completed treatment with surgery and three cycles of chemotherapy on 10/16/17. She is here today for follow up and surveillance.    Oncology History:  The patient has had a recent episode of lower abdominal pain in early July.  She was subsequently sent to the emergency room and was found to have a large 9 cm complex ovarian mass. She was admitted to the hospital for pain control.    Presented 2017 with LLE pain and swelling, found to have a distal DVT  2017 switched from warfarin to rivaroxaban, plan for 3 months of therapy and consider duration of therapy    7/3/17:  1187   17: Exploratory laparotomy, total abdominal hysterectomy, left salpingo-oophorectomy, cancer staging including infracolic omentectomy, bilateral pelvic & para-aortic lymphadenectomy, peritoneal biopsies, evacuation of pelvic fluid by Dr. Cole.    FINAL DIAGNOSIS:   A. LEFT OVARY AND FALLOPIAN TUBE, LEFT SALPINGO-OOPHORECTOMY:   - Ovarian clear cell carcinoma   - Background of endometriosis   - Fallopian tube with no significant histologic abnormality.   B. UTERUS, HYSTERECTOMY:   -  Inactive endometrium   -  Myometrium with adenomyosis and leiomyoma.   -  Cervix with squamous metaplasia.   C. PERITONEUM, RIGHT PELVIS, BIOPSY:   - Fibroadipose tissue, negative for malignancy.   D. LYMPH NODES, RIGHT PELVIC, DISSECTION:   - Thirteen benign lymph nodes (0/13).   E. LYMPH NODES, LEFT PELVIC, DISSECTION:   - Seven benign lymph nodes (0/7).   F. PERITONEUM, LEFT PELVIS, BIOPSY:   - Fibroadipose tissue, negative for malignancy.   G. PERITONEUM, BLADDER, BIOPSY:   - Fibroadipose tissue  with acute and chronic inflammation, negative for malignancy.   H. PERITONEUM, POSTERIOR CUL-DE-SAC,  BIOPSY:   - Fibroadipose tissue, negative for malignancy.   I. PERITONEUM, LEFT PARACOLIC GUTTER, BIOPSY:   - Fibroadipose tissue, negative for malignancy.   J. LYMPH NODES, LEFT PARA-AORTIC, DISSECTION:   - Five benign lymph nodes (0/5).   K. LYMPH NODES, RIGHT PARA-AORTIC, DISSECTION:   - Two benign lymph nodes (0/2).   L. PERITONEUM, RIGHT PARACOLIC GUTTER, BIOPSY:   - Fibroadipose tissue, negative for malignancy.   M. OMENTUM, OMENTECTOMY:   - Adipose tissue with reactive changes, negative for malignancy.   COMMENT:   The final diagnosis confirms the interpretation provided intraoperatively.   Report Name: Ovary or Fallopian Tube   Status: Submitted   Part(s) Involved:   A: Ovary and fallopian tube, left   Synoptic Report:   CLINICAL   Clinical History:   - Pelvic mass   SPECIMEN   Procedure:   - Left salpingo-oophorectomy   - Hysterectomy   - Omentectomy   - Peritoneal  biopsies   Lymph Node Sampling:   - Performed   Location:   - Pelvic lymph nodes   - Para-aortic lymph nodes   Specimen Integrity:   - Left ovary   Specimen Integrity of Left Ovary:   - Capsule intact   TUMOR   Primary Tumor Site(s):   - Left ovary   Left Ovary   Tumor Size of Left Ovary: 19 cm   Histologic Type:   - Clear cell carcinoma   Tumor Extent   Ovarian Surface Involvement:   - Absent   Specimen(s)   Extent of Left Ovary:   - Involved   Extent of Left Fallopian Tube:   - Not involved   Extent of Omentum:   - Not involved   Extent of Uterus:   - Not involved   Extent of Peritoneum:   - Not involved   Peritoneal Ascitic Fluid:   - Not performed / unknown   Pleural Fluid:   - Not performed / unknown   LYMPH NODES     Number of Pelvic Lymph Nodes Examined: 20     Number of Pelvic Lymph Nodes Involved: None identified     Number of Para-aortic Lymph Nodes Examined: 7     Number of Para-Aortic Lymph Nodes Involved: None identified   STAGE (PTNM, AJCC 7TH ED.)   Primary Tumor (pT):   - Ovary   Ovarian Primary Tumor (pT):   - pT1a: Tumor  limited to 1 ovary; capsule intact, no tumor on ovarian surface. No malignant cells in ascites or peritoneal washings#   Regional Lymph Nodes (pN):   - pN0: No regional lymph node metastasis       07/31/17: Dr Shaffer follow up: Discussed with the patient that given the high risk histology, as well as ruptured mass before surgery, she would be qualified at higher risk of recurrence despite early stage ovarian cancer.  Recommended adjuvant chemotherapy. Plan for carboplatin of AUC of 6 and paclitaxel of 175, m2 for 3 cycles. Referral for genetic counselor and will see her back after those 3 cycles  08/03/17: Call from patient stating she is going for a second opinion and would like chemotherapy rescheduled to week of 8/14/17.      08/16/17: Cycle #1 Carbo/Taxol.  73. Significant paclitaxel reaction.  08/30/17: C1 carboplatin/inpatient paclitaxel desensitization.   09/25/17: C2 carboplatin/paclitaxel- inpatient paclitaxel desensitization.  15.  10/16/17: C3 carboplatin/paclitaxel- switched to docetaxel given her neuropathy.  10.  11/14/17:  8. CT CAP:  IMPRESSION:   1. Post surgical changes of hysterectomy and bilateral  salpingo-oophorectomy. Nodular soft tissue density in the posterior  cul-de-sac along with ill-defined nodular thickening in the left  pelvis, suspicious for residual disease or metastatic deposits.   2. There is a 3 cm mass in the superior pole of the right kidney,  possibly extending into the renal hilum. Represents RCC until proven  otherwise. Consider renal mass protocol CT to assess renal vein  involvement.  3. Stable sub-4 mm pulmonary nodules, continued attention on  follow-up.     11/16/17: CT renal mass protocol  IMPRESSION:  1. Arterially enhancing mass measuring 3.6 x 2.1 x 2.2 cm mass arising  from superior posterior of the right kidney, this is renal cell  carcinoma until proven otherwise.  2. Stable fat-containing umbilical hernia.     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 Margins negative. Three month surveillance plan with [facility].     2/26/18:  14     5/22/18:  11      8/23/18:  11     12/12/18:  10     3/8/19:  12     6/19/19:  13     9/23/19:  12    11/8/19: CT Chest:   IMPRESSION:   1. Stable, sub-4 mm pulmonary nodules as above.  2. Stable hypodense, subcentimeter nodules in the thyroid.     3/27/20:  14    10/1/20:  15    3/29/21:  10    7/18/21 CT A/P for abdominal pain in ER: IMPRESSION:   1.  No acute process in the abdomen or pelvis.  2.  Colonic diverticulosis without evidence for diverticulitis.  3.  Post right nephrectomy, hysterectomy, and oophorectomy    10/5/2021:  13  4/1/22:  13  10/21/22:  15    5/23/2023 [facility] ER for left leg swelling and redness. Imaging:  US Lower Extremity Venous Duplex Left  Final Result  IMPRESSION:  1.  No deep venous thrombosis in the left lower extremity.  2.  Superficial thrombophlebitis of greater saphenous vein.    6/1/2023:  pending          Today Hafsa come to the clinic for follow up. She has no gyn concerns. She was recently diagnosed with lower left leg superficial thrombophlebitis in the ER after left leg swelling and erythema. She had prior to this stopped taking Xarelto due to cost concerns. Was on Xarelto for a hx of DVT in 2017. She has since seen Hematology and restarted Xarelto. Symptoms in left leg have resolved.     She denies any vaginal bleeding, no changes in her bowel or bladder habits, no nausea/emesis, no lower extremity edema, and no difficulties eating or sleeping. She denies any abdominal discomfort/bloating, no fevers or chills, and no chest pain or shortness of breath. Occasional mild constipation that she manages with fiber and magnesium.  She is sexually active with her boyfriend without pain or bleeding.     Has lower left back pain from a chronic issue per  patient. She will have left leg pain from this. This has been long term. Feels exacerbated right now. She will schedule PCP follow up for this. Having sciatica down the back. Taking tylenol and heating pad for this. She will try topical Voltaren gel.     She up to date on breast screening (high risk screening through [facility]). She is up to date on her colonoscopy (1/6/20) and mammogram.                 ROS: 10 point ROS neg other than the symptoms noted above in the HPI.      Past Medical History:    Past Medical History:   Diagnosis Date     Anxiety state, unspecified     [id number]     Arthritis 2014    vague, gradual, not treated really, knees feel it     At high risk for breast cancer 09/27/2019    30.3% lifetime risk by SHERON model     Attention deficit disorder without mention of hyperactivity      Cancer (H) 7/2017 and 1/2018    ovarian and kidney cancers, both treated well     Chronic rhinitis      Depressive disorder lifetime    plus Anxiety plus ADD. Escitalipram, therapy, ADD meds maybe     Heart disease 1999    tachycardia and high cholesterol, managed     History of blood transfusion 07/2017    while in hospital for ovarian cancer     Hypertension 1999    tho BP was too LOW last week. past 12 years Generally OK     Panic disorder without agoraphobia      Pure hypercholesterolemia     Lipitor     Tachycardia, unspecified     9/1999-2/2004     Type II or unspecified type diabetes mellitus without mention of complication, not stated as uncontrolled     diagnosed 2004         Past Surgical History:    Past Surgical History:   Procedure Laterality Date     ABSCESS DRAINAGE Left     left leg      AS REMV KIDNEY,RADICAL Right      BIOPSY  7/2017 and 2/2018    ovarian and kidney cancer biopsies. also 1987 breast benign     BREAST CYST EXCISION  1985     BREAST SURGERY  1987    date unsure. benign breast cyst removed     CATARACT IOL, RT/LT Left 05/18/2018     CATARACT IOL, RT/LT Right 07/1318      COLONOSCOPY  2008 and 2013    polyps removed. Next due fall 2018     ENDOMETRIAL ABLATION  2008     EXCISE MASS BACK N/A 12/6/2021    Procedure: EXCISION, MASS, BACK;  Surgeon: Norris Vela MD;  Location: [facility] OR     HYSTERECTOMY TOTAL ABDOMINAL, BILATERAL SALPINGO-OOPHORECTOMY, NODE DISSECTION, COMBINED Left 7/7/2017    Procedure: COMBINED HYSTERECTOMY TOTAL ABDOMINAL, SALPINGO-OOPHORECTOMY, NODE DISSECTION;  Exploratory Laparotomy, Left Salpingo-Oophorectomy, Cancer Staging, Total Hysterectomy, Omentectomy, Evacuation of abdominal fluid, Lymph Node Dissection  Anesthesia Block ;  Surgeon: Serena Cole MD;  Location: [facility] OR     HYSTERECTOMY, PAP NO LONGER INDICATED  07/07/2017    Laparotomy; for ovarian cancer staging     HYSTERECTOMY, PAP NO LONGER INDICATED       INSERT PORT VASCULAR ACCESS Right 8/21/2017    Procedure: INSERT PORT VASCULAR ACCESS;  Single Lumen Chest Power Port;  Surgeon: Stephen Mike PA-C;  [facility]: [facility]     IR PORT REMOVAL RIGHT  11/12/2021     LYMPHADENECTOMY RETROPERITONEAL Bilateral 07/07/2017    Laparotomy; pelvics & para-aortics; for ovarian cancer staging     OMENTECTOMY  07/07/2017    Laparotomy; for ovarian cancer staging     ORTHOPEDIC SURGERY  1/2002    broken left jaw due to fall, 4 fractures, titanium plates     OTHER SURGICAL HISTORY  01/2002    OTHER SURGICAL HISTORYfacial surgery due to fall      PHACOEMULSIFICATION CLEAR CORNEA WITH STANDARD INTRAOCULAR LENS IMPLANT Right 7/13/2018    Procedure: PHACOEMULSIFICATION CLEAR CORNEA WITH STANDARD INTRAOCULAR LENS IMPLANT;  RIght Eye Phacoemulsification Clear Cornea with Standard Intraocular Lens Placement ;  Surgeon: Mary Jo Massey MD;  [facility]     PHACOEMULSIFICATION CLEAR CORNEA WITH TORIC INTRAOCULAR LENS IMPLANT Left 5/18/2018    Procedure: PHACOEMULSIFICATION CLEAR CORNEA WITH TORIC INTRAOCULAR LENS IMPLANT;  Left Eye Phacoemulsification with Toric Lens;  Surgeon: Mary Jo Massey MD;   [facility]     REMOVE PORT VASCULAR ACCESS Right 11/12/2021    Procedure: REMOVAL, VASCULAR ACCESS PORT right;  Surgeon: Afsaneh Das PA-C;  Location: [facility] OR     SALPINGO OOPHORECTOMY,R/L/KAYY Right 2008    Salpingo Oophorectomy, RT     SALPINGO OOPHORECTOMY,R/L/KAYY Left 07/07/2017    Laparotomy; for ovarian cancer staging     SALPINGOOPHORECTOMY  2008     SURGICAL HISTORY OF -       facial surgery d/t fall in 1/2002     SURGICAL HISTORY OF -       1985 removal of breast cyst     SURGICAL HISTORY OF -   2008    endometrial ablation     YAG CAPSULOTOMY OD (RIGHT EYE)  10/22/2018         Health Maintenance Due   Topic Date Due     ZOSTER IMMUNIZATION (1 of 2) Never done     EYE EXAM  11/28/2019     MEDICARE ANNUAL WELLNESS VISIT  12/01/2021     ADVANCE CARE PLANNING  12/15/2022     A1C  01/06/2023     COVID-19 Vaccine (5 - Pfizer series) 02/27/2023     PHQ-9  03/14/2023     FALL RISK ASSESSMENT  04/07/2023       Current Medications:     Current Outpatient Medications   Medication Sig Dispense Refill     Acetaminophen (TYLENOL PO) Take 500 mg by mouth 2 tabs prn pain (monthly)       amphetamine-dextroamphetamine (ADDERALL XR) 20 MG 24 hr capsule Take 20 mg by mouth daily       atorvastatin (LIPITOR) 80 MG tablet TAKE 1 TABLET(80 MG) BY MOUTH DAILY 90 tablet 3     B Complex Vitamins (VITAMIN B-COMPLEX PO) Take 50 mg by mouth        CALCIUM-MAGNESIUM-VITAMIN D PO Take 2 tablets by mouth daily       Cholecalciferol (VITAMIN D) 1000 UNIT capsule Take 2,000 Units by mouth daily        EPINEPHrine (ANY BX GENERIC EQUIV) 0.3 MG/0.3ML injection 2-pack Inject 0.3 mLs (0.3 mg) into the muscle once as needed for anaphylaxis 0.3 mL PRN     escitalopram (LEXAPRO) 20 MG tablet Take 1 tablet (20 mg) by mouth daily 90 tablet 0     fluticasone (FLONASE) 50 MCG/ACT nasal spray SHAKE LIQUID AND USE 2 SPRAYS IN EACH NOSTRIL DAILY 48 g 3     LORazepam (ATIVAN) 1 MG tablet Take 1 tablet (1 mg) by mouth every 6 hours as needed  (nausea/vomiting, anxiety or sleep) 30 tablet 0     Magnesium Hydroxide (MAGNESIA PO) Take 500 mg by mouth       metFORMIN (GLUCOPHAGE XR) 500 MG 24 hr tablet TAKE 2 TABLETS BY MOUTH EVERY DAY WITH THE EVENING MEAL 180 tablet 3     Multiple Vitamins-Minerals (MULTIVITAMIN ADULT PO) Take 1 tablet by mouth daily       nitroFURantoin macrocrystal-monohydrate (MACROBID) 100 MG capsule TAKE 1 CAPSULE BY MOUTH AFTER INTERCOURSE AS NEEDED 30 capsule PRN     Probiotic Product (PRO-BIOTIC BLEND PO) Take 1 capsule by mouth daily Enzymatic Therapy-probiotic pearls       Rivaroxaban ANTICOAGULANT 15 & 20 MG TBPK Starter Therapy Pack Take 15 mg by mouth 2 times daily (with meals) for 21 days, THEN 20 mg daily with food for 9 days. 51 each 0     tamoxifen (NOLVADEX) 10 MG tablet Cut each pill in 1/2 with pill cutter to ensure 5 mg daily dose. 45 tablet 3     traZODone (DESYREL) 50 MG tablet TAKE 1 TABLET  BY MOUTH EVERY NIGHT AS NEEDED FOR SLEEP 180 tablet PRN     ASPIRIN NOT PRESCRIBED (INTENTIONAL) Please choose reason not prescribed from choices below. (Patient not taking: Reported on 5/24/2023)       cephALEXin (KEFLEX) 500 MG capsule Take 1 capsule (500 mg) by mouth 4 times daily (Patient not taking: Reported on 1/25/2023) 20 capsule 0     DiphenhydrAMINE HCl (BENADRYL PO) Take 50 mg by mouth daily as needed (monthly)       nystatin (MYCOSTATIN) 929195 UNIT/GM external powder Apply topically 2 times daily as needed Apply to folds under breast and abdomen. (Patient not taking: Reported on 1/25/2023) 60 g 1     rivaroxaban ANTICOAGULANT (XARELTO) 20 MG TABS tablet Take 1 tablet (20 mg) by mouth daily (with dinner) (Patient not taking: Reported on 5/22/2023) 30 tablet 11         Allergies:        Allergies   Allergen Reactions     Paclitaxel Anaphylaxis and Difficulty breathing     Wasps [Hornets] Anaphylaxis     Yellow Hornet Venom [Hornet Venom] Anaphylaxis and Swelling     Yellow Jacket Venom [Honey Bee Venom] Anaphylaxis  and Swelling     Sulfa Antibiotics Hives and Rash        Social History:     Social History     Tobacco Use     Smoking status: Never     Smokeless tobacco: Never   Vaping Use     Vaping status: Never Used   Substance Use Topics     Alcohol use: Yes     Comment: Very infrequent, a bit of wine or beer 2x per month maybe       History   Drug Use     Types: Marijuana     Comment: infrequent, for celebrations only, maybe 8x per year         Family History:     The patient's family history is notable for:     Family History   Problem Relation Age of Onset     C.A.D. Mother      Hypertension Mother      Breast Cancer Mother      Psychotic Disorder Mother      Colon Cancer Mother      Cancer Mother         Bone cancer     Coronary Artery Disease Mother      Hyperlipidemia Mother         treated w. statins     Other Cancer Mother         bone/marrow, ,  of this     Depression Mother      Anxiety Disorder Mother      Mental Illness Mother         various undiagnosed types, but THERE     Obesity Mother      Bone Cancer Mother      Other - See Comments Mother         psychotic disease      C.A.D. Father      Diabetes Father      Hypertension Father      Cerebrovascular Disease Father         1984 or      Psychotic Disorder Father      Pancreatic Cancer Father      Coronary Artery Disease Father      Hyperlipidemia Father         treated w statins     Other Cancer Father         pancreatic, ,  of this     Depression Father      Mental Illness Father         likely PTSD and depression     Obesity Father      Other - See Comments Father         cerebrovascular disease, psychotic disease      Psychotic Disorder Sister         x2     Neurologic Disorder Sister      Hypertension Sister      Hyperlipidemia Sister         treated w statins     Depression Sister      Anxiety Disorder Sister      Obesity Sister      Rashes/Skin Problems Sister         precancerous lesion on leg     Psychotic Disorder Sister       Other - See Comments Sister         small kidney stone      Hypertension Sister      Hyperlipidemia Sister         treated w statins     Depression Sister      Anxiety Disorder Sister      Other - See Comments Sister         psychotic disease      Prostate Problems Brother         cleared      Hypertension Brother      Hyperlipidemia Brother         unsure if treated w statins     Depression Brother      Anxiety Disorder Brother      Mental Illness Brother         undiagnosed but THERE     Obesity Brother      Pacemaker Brother 70        bradycardia, sick sinus syndrome     Psychotic Disorder Brother         x2     Depression Brother         major depressive disorder and OCD     Anxiety Disorder Brother      Mental Illness Brother         not sure of diagnoses, treated     Hypertension Brother      Hyperlipidemia Brother      Prostate Cancer Brother      Depression Brother      Anxiety Disorder Brother      Other - See Comments Brother         psychotic disease      Psychotic Disorder Maternal Grandmother         ?     Coronary Artery Disease Maternal Grandmother          of heart attack age 84?     Depression Maternal Grandmother      Psychotic Disorder Maternal Grandfather         ?     Psychotic Disorder Paternal Grandmother         Schizophernia     Coronary Artery Disease Paternal Grandmother          of heart attack, age 85?     Depression Paternal Grandmother         severe, but recovered     Mental Illness Paternal Grandmother         possible bipolar or other     Schizophrenia Paternal Grandmother      Psychotic Disorder Paternal Grandfather         ?     Respiratory Paternal Grandfather          of emphysema and smoking     Emphysema Paternal Grandfather      Cancer - colorectal No family hx of      Glaucoma No family hx of      Macular Degeneration No family hx of          Physical Exam:     /76   Pulse 98   Temp 98.6  F (37  C) (Oral)   Wt 92.9 kg (204 lb 14.4 oz)   LMP 2009    SpO2 96%   BMI 36.30 kg/m    Body mass index is 36.3 kg/m .    General Appearance: healthy and alert, no distress     HEENT: no thyromegaly, no palpable nodules or masses       Cardiovascular: regular rate and rhythm, no gallops, rubs or murmurs     Respiratory: lungs clear, no rales, rhonchi or wheezes    Musculoskeletal: extremities non tender without edema    Skin:  no lesions or rashes    Neurological: normal gait, no gross defects     Psychiatric: appropriate mood and affect                               Hematological: normal cervical, supraclavicular and inguinal lymph nodes     Gastrointestinal:       abdomen soft, non-tender, non-distended, no organomegaly or masses    Genitourinary: External genitalia and urethral meatus appears normal. Vagina is smooth without nodularity or masses. Cervix surgically absent.  Bimanual exam reveal no masses, nodularity or fullness. Recto-vaginal exam confirms these findings.       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 She completed treatment with surgery and three cycles of chemotherapy on 10/16/17. She is here today for follow up and surveillance.    30 minutes spent on the date of the encounter doing chart review, history and exam, documentation, and further activities as noted above.          Plan:     1.)         ASHISH on exam. No concerning s/s of an ovarian recurrence but will await  result. Continue annual surveillance visits with  and pelvic/rectal exam. Reviewed signs and symptoms for when she should contact the clinic or seek additional care. Patient to contact the clinic with any questions or concerns in the interim.     2.) Genetic testing: Hafsa is negative for mutations in the AIP, ALK, APC, COSTA, BAP1, BARD1, BLM, BRCA1, BRCA2, BRIP1, BMPR1A, CDH1, CDK4, CDKN1B, CDKN2A, CHEK2, DICER1, EPCAM, FANCC, FH, FLCN, GALNT12, GREM1, HOXB13, MAX, MEN1, MET, MITF, MLH1, MRE11A,  MSH2, MSH6, MUTYH, NBN, NF1, NF2, PALB2, PHOX2B, PMS2, POLD1, POLE, POT1, GCKCL9B, PTCH1, PTEN, RAD50, RAD51C, RAD51D, RB1, RET, SDHA, SDHAF2, SDHB, SDHC, SDHD, SMAD4, SMARCA4, SMARCB1, SMARCE1, STK11, SUFU, XFSK896, TP53, TSC1, TSC2, VHL, and XRCC2 genes. No mutations were found in any of the 67 genes analyzed. High risk breast screening. Sees [facility]. On low dose tamoxifen since 10/10/2019 for prevention of breast cancer.     3.) Labs and/or tests ordered include:   pending     4.) Health maintenance issues addressed today include annual health maintenance and non-gynecologic issues with PCP. Encouraged PCP follow up for chronic left sciatica back pain. Reviewed supportive measures with pt today.     5.)        Lung nodules: stable for two years on CT considered statistically benign and no further imaging needed unless new symptoms.     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 Mercado with Urology. Urinary incontinence: stress and urge. Considering surgery.     7.)         Distal DVT 2017 left leg superficial thrombophlebitis 5/2023: pt has seen Hematology and plans to continue Xarelto ongoing. Symptoms mostly resolved in left leg (erythema and edema). No pain. Per hematology, for interruption of anticoagulation, she should stop therapy 1 day prior to the minor procedure or 2 days before a major surgical procedure. She may resume anticoagulation at discussion of her surgeon.  She will continue to see hematology annually.               MARLON Bradshaw, NP-BC  Women's Health Nurse Practitioner  Division of Gynecologic Oncology  [facility]          CC  Patient Care Team:  Morelia Ayon NP as PCP - General  Karla Oswald RN as Continuity Care Coordinator (Gynecologic Oncology)  Nabeel Dick MD as MD (Urology)  Javier Gregorio MD as MD (Urology)  Lola Vasquez, PhD LP as Psychologist (Psychology)  Nany  Morelia HERRERA NP as Assigned PCP  Leland Mercado MD as MD (Urology)  Dayanna Clayton, RN as Registered Nurse (Oncology)  Iris Dalton APRN CNP as Assigned Cancer Care Provider  Norris Vela MD as MD (Critical Care)  Morelia Ayon NP as Referring Physician (Nurse Practitioner - Family)  Andreia Rosales PA-C as Physician Assistant (Dermatology)  Marc Fregoso MD as Assigned Surgical Provider

## 2023-06-01 ENCOUNTER — LAB (OUTPATIENT)
Dept: LAB | Facility: CLINIC | Age: 68
End: 2023-06-01
Attending: INTERNAL MEDICINE
Payer: MEDICARE

## 2023-06-01 ENCOUNTER — ONCOLOGY VISIT (OUTPATIENT)
Dept: ONCOLOGY | Facility: CLINIC | Age: 68
End: 2023-06-01
Attending: OBSTETRICS & GYNECOLOGY
Payer: MEDICARE

## 2023-06-01 VITALS
SYSTOLIC BLOOD PRESSURE: 112 MMHG | HEART RATE: 98 BPM | WEIGHT: 204.9 LBS | BODY MASS INDEX: 36.3 KG/M2 | OXYGEN SATURATION: 96 % | DIASTOLIC BLOOD PRESSURE: 76 MMHG | TEMPERATURE: 98.6 F

## 2023-06-01 DIAGNOSIS — C56.2 OVARIAN CANCER, LEFT (H): Primary | ICD-10-CM

## 2023-06-01 DIAGNOSIS — C56.2 OVARIAN CANCER, LEFT (H): ICD-10-CM

## 2023-06-01 LAB — CANCER AG125 SERPL-ACNC: 16 U/ML

## 2023-06-01 PROCEDURE — 86304 IMMUNOASSAY TUMOR CA 125: CPT

## 2023-06-01 PROCEDURE — 36415 COLL VENOUS BLD VENIPUNCTURE: CPT

## 2023-06-01 PROCEDURE — 99214 OFFICE O/P EST MOD 30 MIN: CPT | Performed by: OBSTETRICS & GYNECOLOGY

## 2023-06-01 PROCEDURE — G0463 HOSPITAL OUTPT CLINIC VISIT: HCPCS | Performed by: OBSTETRICS & GYNECOLOGY

## 2023-06-01 ASSESSMENT — PAIN SCALES - GENERAL: PAINLEVEL: SEVERE PAIN (6)

## 2023-06-01 NOTE — NURSING NOTE
"Oncology Rooming Note    June 1, 2023 10:50 AM   Hafsa Corona is a 68 year old female who presents for:    Chief Complaint   Patient presents with     Oncology Clinic Visit     RTN: Ovarian cancer     Initial Vitals: /76   Pulse 98   Temp 98.6  F (37  C) (Oral)   Wt 92.9 kg (204 lb 14.4 oz)   LMP 01/01/2009   SpO2 96%   BMI 36.30 kg/m   Estimated body mass index is 36.3 kg/m  as calculated from the following:    Height as of 5/22/23: 1.6 m (5' 3\").    Weight as of this encounter: 92.9 kg (204 lb 14.4 oz). Body surface area is 2.03 meters squared.  Severe Pain (6) Comment: Data Unavailable   Patient's last menstrual period was 01/01/2009.  Allergies reviewed: Yes  Medications reviewed: Yes    Medications: Medication refills not needed today.  Pharmacy name entered into EPIC:    [facility] #[id number] - [place], [place] - [address] AT [facility] [address]  [id number] [place] - [facility] [facility] [address]  [facility] [place], [place] - [address] [facility] [phone]  [facility] [place], [place] [address]  [facility][id number] - [place], [place] - [address] AT [facility] [address]  [facility] [address]    Clinical concerns: None  Nathalia Giraldo              "

## 2023-06-01 NOTE — LETTER
2023         RE: Hafsa Corona  [address]  [place] [address]        Dear Colleague,    Thank you for referring your patient, Hafsa Corona, to the [facility]. Please see a copy of my visit note below.                    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 She completed treatment with surgery and three cycles of chemotherapy on 10/16/17. She is here today for follow up and surveillance.    Oncology History:  The patient has had a recent episode of lower abdominal pain in early July.  She was subsequently sent to the emergency room and was found to have a large 9 cm complex ovarian mass. She was admitted to the hospital for pain control.    Presented 2017 with LLE pain and swelling, found to have a distal DVT  2017 switched from warfarin to rivaroxaban, plan for 3 months of therapy and consider duration of therapy    7/3/17:  1187   17: Exploratory laparotomy, total abdominal hysterectomy, left salpingo-oophorectomy, cancer staging including infracolic omentectomy, bilateral pelvic & para-aortic lymphadenectomy, peritoneal biopsies, evacuation of pelvic fluid by Dr. Cole.    FINAL DIAGNOSIS:   A. LEFT OVARY AND FALLOPIAN TUBE, LEFT SALPINGO-OOPHORECTOMY:   - Ovarian clear cell carcinoma   - Background of endometriosis   - Fallopian tube with no significant histologic abnormality.   B. UTERUS, HYSTERECTOMY:   -  Inactive endometrium   -  Myometrium with adenomyosis and leiomyoma.   -  Cervix with squamous metaplasia.   C. PERITONEUM, RIGHT PELVIS, BIOPSY:   - Fibroadipose tissue, negative for malignancy.   D. LYMPH NODES, RIGHT PELVIC, DISSECTION:   - Thirteen benign lymph nodes (0/13).   E. LYMPH NODES, LEFT PELVIC, DISSECTION:   - Seven benign lymph nodes  (0/7).   F. PERITONEUM, LEFT PELVIS, BIOPSY:   - Fibroadipose tissue, negative for malignancy.   G. PERITONEUM, BLADDER, BIOPSY:   - Fibroadipose tissue  with acute and chronic inflammation, negative for malignancy.   H. PERITONEUM, POSTERIOR CUL-DE-SAC, BIOPSY:   - Fibroadipose tissue, negative for malignancy.   I. PERITONEUM, LEFT PARACOLIC GUTTER, BIOPSY:   - Fibroadipose tissue, negative for malignancy.   J. LYMPH NODES, LEFT PARA-AORTIC, DISSECTION:   - Five benign lymph nodes (0/5).   K. LYMPH NODES, RIGHT PARA-AORTIC, DISSECTION:   - Two benign lymph nodes (0/2).   L. PERITONEUM, RIGHT PARACOLIC GUTTER, BIOPSY:   - Fibroadipose tissue, negative for malignancy.   M. OMENTUM, OMENTECTOMY:   - Adipose tissue with reactive changes, negative for malignancy.   COMMENT:   The final diagnosis confirms the interpretation provided intraoperatively.   Report Name: Ovary or Fallopian Tube   Status: Submitted   Part(s) Involved:   A: Ovary and fallopian tube, left   Synoptic Report:   CLINICAL   Clinical History:   - Pelvic mass   SPECIMEN   Procedure:   - Left salpingo-oophorectomy   - Hysterectomy   - Omentectomy   - Peritoneal  biopsies   Lymph Node Sampling:   - Performed   Location:   - Pelvic lymph nodes   - Para-aortic lymph nodes   Specimen Integrity:   - Left ovary   Specimen Integrity of Left Ovary:   - Capsule intact   TUMOR   Primary Tumor Site(s):   - Left ovary   Left Ovary   Tumor Size of Left Ovary: 19 cm   Histologic Type:   - Clear cell carcinoma   Tumor Extent   Ovarian Surface Involvement:   - Absent   Specimen(s)   Extent of Left Ovary:   - Involved   Extent of Left Fallopian Tube:   - Not involved   Extent of Omentum:   - Not involved   Extent of Uterus:   - Not involved   Extent of Peritoneum:   - Not involved   Peritoneal Ascitic Fluid:   - Not performed / unknown   Pleural Fluid:   - Not performed / unknown   LYMPH NODES     Number of Pelvic Lymph Nodes Examined: 20     Number of Pelvic Lymph  Nodes Involved: None identified     Number of Para-aortic Lymph Nodes Examined: 7     Number of Para-Aortic Lymph Nodes Involved: None identified   STAGE (PTNM, AJCC 7TH ED.)   Primary Tumor (pT):   - Ovary   Ovarian Primary Tumor (pT):   - pT1a: Tumor limited to 1 ovary; capsule intact, no tumor on ovarian surface. No malignant cells in ascites or peritoneal washings#   Regional Lymph Nodes (pN):   - pN0: No regional lymph node metastasis       07/31/17: Dr Shaffer follow up: Discussed with the patient that given the high risk histology, as well as ruptured mass before surgery, she would be qualified at higher risk of recurrence despite early stage ovarian cancer.  Recommended adjuvant chemotherapy. Plan for carboplatin of AUC of 6 and paclitaxel of 175, m2 for 3 cycles. Referral for genetic counselor and will see her back after those 3 cycles  08/03/17: Call from patient stating she is going for a second opinion and would like chemotherapy rescheduled to week of 8/14/17.      08/16/17: Cycle #1 Carbo/Taxol.  73. Significant paclitaxel reaction.  08/30/17: C1 carboplatin/inpatient paclitaxel desensitization.   09/25/17: C2 carboplatin/paclitaxel- inpatient paclitaxel desensitization.  15.  10/16/17: C3 carboplatin/paclitaxel- switched to docetaxel given her neuropathy.  10.  11/14/17:  8. CT CAP:  IMPRESSION:   1. Post surgical changes of hysterectomy and bilateral  salpingo-oophorectomy. Nodular soft tissue density in the posterior  cul-de-sac along with ill-defined nodular thickening in the left  pelvis, suspicious for residual disease or metastatic deposits.   2. There is a 3 cm mass in the superior pole of the right kidney,  possibly extending into the renal hilum. Represents RCC until proven  otherwise. Consider renal mass protocol CT to assess renal vein  involvement.  3. Stable sub-4 mm pulmonary nodules, continued attention on  follow-up.     11/16/17: CT renal mass  protocol  IMPRESSION:  1. Arterially enhancing mass measuring 3.6 x 2.1 x 2.2 cm mass arising  from superior posterior of the right kidney, this is renal cell  carcinoma until proven otherwise.  2. Stable fat-containing umbilical hernia.     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 Margins negative. Three month surveillance plan with [place].     2/26/18:  14     5/22/18:  11      8/23/18:  11     12/12/18:  10     3/8/19:  12     6/19/19:  13     9/23/19:  12    11/8/19: CT Chest:   IMPRESSION:   1. Stable, sub-4 mm pulmonary nodules as above.  2. Stable hypodense, subcentimeter nodules in the thyroid.     3/27/20:  14    10/1/20:  15    3/29/21:  10    7/18/21 CT A/P for abdominal pain in ER: IMPRESSION:   1.  No acute process in the abdomen or pelvis.  2.  Colonic diverticulosis without evidence for diverticulitis.  3.  Post right nephrectomy, hysterectomy, and oophorectomy    10/5/2021:  13  4/1/22:  13  10/21/22:  15    5/23/2023 [facility] ER for left leg swelling and redness. Imaging:  US Lower Extremity Venous Duplex Left  Final Result  IMPRESSION:  1.  No deep venous thrombosis in the left lower extremity.  2.  Superficial thrombophlebitis of greater saphenous vein.    6/1/2023:  pending        Today Hafsa come to the clinic for follow up. She has no gyn concerns. She was recently diagnosed with lower left leg superficial thrombophlebitis in the ER after left leg swelling and erythema. She had prior to this stopped taking Xarelto due to cost concerns. Was on Xarelto for a hx of DVT in 2017. She has since seen Hematology and restarted Xarelto. Symptoms in left leg have resolved.     She denies any vaginal bleeding, no changes in her bowel or bladder habits, no nausea/emesis, no lower extremity edema, and no difficulties eating or sleeping. She denies any abdominal discomfort/bloating,  no fevers or chills, and no chest pain or shortness of breath. Occasional mild constipation that she manages with fiber and magnesium.  She is sexually active with her boyfriend without pain or bleeding.     Has lower left back pain from a chronic issue per patient. She will have left leg pain from this. This has been long term. Feels exacerbated right now. She will schedule PCP follow up for this. Having sciatica down the back. Taking tylenol and heating pad for this. She will try topical Voltaren gel.     She up to date on breast screening (high risk screening through [facility]). She is up to date on her colonoscopy (1/6/20) and mammogram.                 ROS: 10 point ROS neg other than the symptoms noted above in the HPI.      Past Medical History:    Past Medical History:   Diagnosis Date    Anxiety state, unspecified     [id number]    Arthritis 2014    vague, gradual, not treated really, knees feel it    At high risk for breast cancer 09/27/2019    30.3% lifetime risk by SHERON model    Attention deficit disorder without mention of hyperactivity     Cancer (H) 7/2017 and 1/2018    ovarian and kidney cancers, both treated well    Chronic rhinitis     Depressive disorder lifetime    plus Anxiety plus ADD. Escitalipram, therapy, ADD meds maybe    Heart disease 1999    tachycardia and high cholesterol, managed    History of blood transfusion 07/2017    while in hospital for ovarian cancer    Hypertension 1999    tho BP was too LOW last week. past 12 years Generally OK    Panic disorder without agoraphobia     Pure hypercholesterolemia     Lipitor    Tachycardia, unspecified     9/1999-2/2004    Type II or unspecified type diabetes mellitus without mention of complication, not stated as uncontrolled     diagnosed 2004         Past Surgical History:    Past Surgical History:   Procedure Laterality Date    ABSCESS DRAINAGE Left     left leg     AS REMV KIDNEY,RADICAL Right     BIOPSY  7/2017 and 2/2018     ovarian and kidney cancer biopsies. also 1987 breast benign    BREAST CYST EXCISION  1985    BREAST SURGERY  1987    date unsure. benign breast cyst removed    CATARACT IOL, RT/LT Left 05/18/2018    CATARACT IOL, RT/LT Right 07/1318    COLONOSCOPY  2008 and 2013    polyps removed. Next due fall 2018    ENDOMETRIAL ABLATION  2008    EXCISE MASS BACK N/A 12/6/2021    Procedure: EXCISION, MASS, BACK;  Surgeon: Norris Vela MD;  Location: [facility] OR    HYSTERECTOMY TOTAL ABDOMINAL, BILATERAL SALPINGO-OOPHORECTOMY, NODE DISSECTION, COMBINED Left 7/7/2017    Procedure: COMBINED HYSTERECTOMY TOTAL ABDOMINAL, SALPINGO-OOPHORECTOMY, NODE DISSECTION;  Exploratory Laparotomy, Left Salpingo-Oophorectomy, Cancer Staging, Total Hysterectomy, Omentectomy, Evacuation of abdominal fluid, Lymph Node Dissection  Anesthesia Block ;  Surgeon: Serena Cole MD;  Location: [facility] OR    HYSTERECTOMY, PAP NO LONGER INDICATED  07/07/2017    Laparotomy; for ovarian cancer staging    HYSTERECTOMY, PAP NO LONGER INDICATED      INSERT PORT VASCULAR ACCESS Right 8/21/2017    Procedure: INSERT PORT VASCULAR ACCESS;  Single Lumen Chest Power Port;  Surgeon: Stephen Mike PA-C;  [facility]    IR PORT REMOVAL RIGHT  11/12/2021    LYMPHADENECTOMY RETROPERITONEAL Bilateral 07/07/2017    Laparotomy; pelvics & para-aortics; for ovarian cancer staging    OMENTECTOMY  07/07/2017    Laparotomy; for ovarian cancer staging    ORTHOPEDIC SURGERY  1/2002    broken left jaw due to fall, 4 fractures, titanium plates    OTHER SURGICAL HISTORY  01/2002    OTHER SURGICAL HISTORYfacial surgery due to fall     PHACOEMULSIFICATION CLEAR CORNEA WITH STANDARD INTRAOCULAR LENS IMPLANT Right 7/13/2018    Procedure: PHACOEMULSIFICATION CLEAR CORNEA WITH STANDARD INTRAOCULAR LENS IMPLANT;  RIght Eye Phacoemulsification Clear Cornea with Standard Intraocular Lens Placement ;  Surgeon: Mary Jo Massey MD;  [facility]    PHACOEMULSIFICATION CLEAR  CORNEA WITH TORIC INTRAOCULAR LENS IMPLANT Left 5/18/2018    Procedure: PHACOEMULSIFICATION CLEAR CORNEA WITH TORIC INTRAOCULAR LENS IMPLANT;  Left Eye Phacoemulsification with Toric Lens;  Surgeon: Mary Jo Massey MD;  [facility]    REMOVE PORT VASCULAR ACCESS Right 11/12/2021    Procedure: REMOVAL, VASCULAR ACCESS PORT right;  Surgeon: Afsaneh Das PA-C;  Location: [facility] OR    SALPINGO OOPHORECTOMY,R/L/KAYY Right 2008    Salpingo Oophorectomy, RT    SALPINGO OOPHORECTOMY,R/L/KAYY Left 07/07/2017    Laparotomy; for ovarian cancer staging    SALPINGOOPHORECTOMY  2008    SURGICAL HISTORY OF -       facial surgery d/t fall in 1/2002    SURGICAL HISTORY OF -       1985 removal of breast cyst    SURGICAL HISTORY OF -   2008    endometrial ablation    YAG CAPSULOTOMY OD (RIGHT EYE)  10/22/2018         Health Maintenance Due   Topic Date Due    ZOSTER IMMUNIZATION (1 of 2) Never done    EYE EXAM  11/28/2019    MEDICARE ANNUAL WELLNESS VISIT  12/01/2021    ADVANCE CARE PLANNING  12/15/2022    A1C  01/06/2023    COVID-19 Vaccine (5 - Pfizer series) 02/27/2023    PHQ-9  03/14/2023    FALL RISK ASSESSMENT  04/07/2023       Current Medications:     Current Outpatient Medications   Medication Sig Dispense Refill    Acetaminophen (TYLENOL PO) Take 500 mg by mouth 2 tabs prn pain (monthly)      amphetamine-dextroamphetamine (ADDERALL XR) 20 MG 24 hr capsule Take 20 mg by mouth daily      atorvastatin (LIPITOR) 80 MG tablet TAKE 1 TABLET(80 MG) BY MOUTH DAILY 90 tablet 3    B Complex Vitamins (VITAMIN B-COMPLEX PO) Take 50 mg by mouth       CALCIUM-MAGNESIUM-VITAMIN D PO Take 2 tablets by mouth daily      Cholecalciferol (VITAMIN D) 1000 UNIT capsule Take 2,000 Units by mouth daily       EPINEPHrine (ANY BX GENERIC EQUIV) 0.3 MG/0.3ML injection 2-pack Inject 0.3 mLs (0.3 mg) into the muscle once as needed for anaphylaxis 0.3 mL PRN    escitalopram (LEXAPRO) 20 MG tablet Take 1 tablet (20 mg) by mouth daily 90 tablet 0     fluticasone (FLONASE) 50 MCG/ACT nasal spray SHAKE LIQUID AND USE 2 SPRAYS IN EACH NOSTRIL DAILY 48 g 3    LORazepam (ATIVAN) 1 MG tablet Take 1 tablet (1 mg) by mouth every 6 hours as needed (nausea/vomiting, anxiety or sleep) 30 tablet 0    Magnesium Hydroxide (MAGNESIA PO) Take 500 mg by mouth      metFORMIN (GLUCOPHAGE XR) 500 MG 24 hr tablet TAKE 2 TABLETS BY MOUTH EVERY DAY WITH THE EVENING MEAL 180 tablet 3    Multiple Vitamins-Minerals (MULTIVITAMIN ADULT PO) Take 1 tablet by mouth daily      nitroFURantoin macrocrystal-monohydrate (MACROBID) 100 MG capsule TAKE 1 CAPSULE BY MOUTH AFTER INTERCOURSE AS NEEDED 30 capsule PRN    Probiotic Product (PRO-BIOTIC BLEND PO) Take 1 capsule by mouth daily Enzymatic Therapy-probiotic pearls      Rivaroxaban ANTICOAGULANT 15 & 20 MG TBPK Starter Therapy Pack Take 15 mg by mouth 2 times daily (with meals) for 21 days, THEN 20 mg daily with food for 9 days. 51 each 0    tamoxifen (NOLVADEX) 10 MG tablet Cut each pill in 1/2 with pill cutter to ensure 5 mg daily dose. 45 tablet 3    traZODone (DESYREL) 50 MG tablet TAKE 1 TABLET  BY MOUTH EVERY NIGHT AS NEEDED FOR SLEEP 180 tablet PRN    ASPIRIN NOT PRESCRIBED (INTENTIONAL) Please choose reason not prescribed from choices below. (Patient not taking: Reported on 5/24/2023)      cephALEXin (KEFLEX) 500 MG capsule Take 1 capsule (500 mg) by mouth 4 times daily (Patient not taking: Reported on 1/25/2023) 20 capsule 0    DiphenhydrAMINE HCl (BENADRYL PO) Take 50 mg by mouth daily as needed (monthly)      nystatin (MYCOSTATIN) 934721 UNIT/GM external powder Apply topically 2 times daily as needed Apply to folds under breast and abdomen. (Patient not taking: Reported on 1/25/2023) 60 g 1    rivaroxaban ANTICOAGULANT (XARELTO) 20 MG TABS tablet Take 1 tablet (20 mg) by mouth daily (with dinner) (Patient not taking: Reported on 5/22/2023) 30 tablet 11         Allergies:        Allergies   Allergen Reactions    Paclitaxel Anaphylaxis  and Difficulty breathing    Wasps [Hornets] Anaphylaxis    Yellow Hornet Venom [Hornet Venom] Anaphylaxis and Swelling    Yellow Jacket Venom [Honey Bee Venom] Anaphylaxis and Swelling    Sulfa Antibiotics Hives and Rash        Social History:     Social History     Tobacco Use    Smoking status: Never    Smokeless tobacco: Never   Vaping Use    Vaping status: Never Used   Substance Use Topics    Alcohol use: Yes     Comment: Very infrequent, a bit of wine or beer 2x per month maybe       History   Drug Use    Types: Marijuana     Comment: infrequent, for celebrations only, maybe 8x per year         Family History:     The patient's family history is notable for:     Family History   Problem Relation Age of Onset    C.A.D. Mother     Hypertension Mother     Breast Cancer Mother     Psychotic Disorder Mother     Colon Cancer Mother     Cancer Mother         Bone cancer    Coronary Artery Disease Mother     Hyperlipidemia Mother         treated w. statins    Other Cancer Mother         bone/marrow, ,  of this    Depression Mother     Anxiety Disorder Mother     Mental Illness Mother         various undiagnosed types, but THERE    Obesity Mother     Bone Cancer Mother     Other - See Comments Mother         psychotic disease     C.A.D. Father     Diabetes Father     Hypertension Father     Cerebrovascular Disease Father         1984 or     Psychotic Disorder Father     Pancreatic Cancer Father     Coronary Artery Disease Father     Hyperlipidemia Father         treated w statins    Other Cancer Father         pancreatic, ,  of this    Depression Father     Mental Illness Father         likely PTSD and depression    Obesity Father     Other - See Comments Father         cerebrovascular disease, psychotic disease     Psychotic Disorder Sister         x2    Neurologic Disorder Sister     Hypertension Sister     Hyperlipidemia Sister         treated w statins    Depression Sister     Anxiety  Disorder Sister     Obesity Sister     Rashes/Skin Problems Sister         precancerous lesion on leg    Psychotic Disorder Sister     Other - See Comments Sister         small kidney stone     Hypertension Sister     Hyperlipidemia Sister         treated w statins    Depression Sister     Anxiety Disorder Sister     Other - See Comments Sister         psychotic disease     Prostate Problems Brother         cleared     Hypertension Brother     Hyperlipidemia Brother         unsure if treated w statins    Depression Brother     Anxiety Disorder Brother     Mental Illness Brother         undiagnosed but THERE    Obesity Brother     Pacemaker Brother 70        bradycardia, sick sinus syndrome    Psychotic Disorder Brother         x2    Depression Brother         major depressive disorder and OCD    Anxiety Disorder Brother     Mental Illness Brother         not sure of diagnoses, treated    Hypertension Brother     Hyperlipidemia Brother     Prostate Cancer Brother     Depression Brother     Anxiety Disorder Brother     Other - See Comments Brother         psychotic disease     Psychotic Disorder Maternal Grandmother         ?    Coronary Artery Disease Maternal Grandmother          of heart attack age 84?    Depression Maternal Grandmother     Psychotic Disorder Maternal Grandfather         ?    Psychotic Disorder Paternal Grandmother         Schizophernia    Coronary Artery Disease Paternal Grandmother          of heart attack, age 85?    Depression Paternal Grandmother         severe, but recovered    Mental Illness Paternal Grandmother         possible bipolar or other    Schizophrenia Paternal Grandmother     Psychotic Disorder Paternal Grandfather         ?    Respiratory Paternal Grandfather          of emphysema and smoking    Emphysema Paternal Grandfather     Cancer - colorectal No family hx of     Glaucoma No family hx of     Macular Degeneration No family hx of          Physical Exam:     BP  112/76   Pulse 98   Temp 98.6  F (37  C) (Oral)   Wt 92.9 kg (204 lb 14.4 oz)   LMP 01/01/2009   SpO2 96%   BMI 36.30 kg/m    Body mass index is 36.3 kg/m .    General Appearance: healthy and alert, no distress     HEENT: no thyromegaly, no palpable nodules or masses       Cardiovascular: regular rate and rhythm, no gallops, rubs or murmurs     Respiratory: lungs clear, no rales, rhonchi or wheezes    Musculoskeletal: extremities non tender without edema    Skin:  no lesions or rashes    Neurological: normal gait, no gross defects     Psychiatric: appropriate mood and affect                               Hematological: normal cervical, supraclavicular and inguinal lymph nodes     Gastrointestinal:       abdomen soft, non-tender, non-distended, no organomegaly or masses    Genitourinary: External genitalia and urethral meatus appears normal. Vagina is smooth without nodularity or masses. Cervix surgically absent.  Bimanual exam reveal no masses, nodularity or fullness. Recto-vaginal exam confirms these findings.       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 She completed treatment with surgery and three cycles of chemotherapy on 10/16/17. She is here today for follow up and surveillance.    30 minutes spent on the date of the encounter doing chart review, history and exam, documentation, and further activities as noted above.          Plan:     1.)         ASHISH on exam. No concerning s/s of an ovarian recurrence but will await  result. Continue annual surveillance visits with  and pelvic/rectal exam. Reviewed signs and symptoms for when she should contact the clinic or seek additional care. Patient to contact the clinic with any questions or concerns in the interim.     2.) Genetic testing: Hafsa is negative for mutations in the AIP, ALK, APC, COSTA, BAP1, BARD1, BLM, BRCA1, BRCA2, BRIP1, BMPR1A, CDH1, CDK4, CDKN1B, CDKN2A,  CHEK2, DICER1, EPCAM, FANCC, FH, FLCN, GALNT12, GREM1, HOXB13, MAX, MEN1, MET, MITF, MLH1, MRE11A, MSH2, MSH6, MUTYH, NBN, NF1, NF2, PALB2, PHOX2B, PMS2, POLD1, POLE, POT1, MPFPF3D, PTCH1, PTEN, RAD50, RAD51C, RAD51D, RB1, RET, SDHA, SDHAF2, SDHB, SDHC, SDHD, SMAD4, SMARCA4, SMARCB1, SMARCE1, STK11, SUFU, MLKJ091, TP53, TSC1, TSC2, VHL, and XRCC2 genes. No mutations were found in any of the 67 genes analyzed. High risk breast screening. Sees [facility]. On low dose tamoxifen since 10/10/2019 for prevention of breast cancer.     3.) Labs and/or tests ordered include:   pending     4.) Health maintenance issues addressed today include annual health maintenance and non-gynecologic issues with PCP. Encouraged PCP follow up for chronic left sciatica back pain. Reviewed supportive measures with pt today.     5.)        Lung nodules: stable for two years on CT considered statistically benign and no further imaging needed unless new symptoms.     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 Mercado with Urology. Urinary incontinence: stress and urge. Considering surgery.     7.)         Distal DVT 2017 left leg superficial thrombophlebitis 5/2023: pt has seen Hematology and plans to continue Xarelto ongoing. Symptoms mostly resolved in left leg (erythema and edema). No pain. Per hematology, for interruption of anticoagulation, she should stop therapy 1 day prior to the minor procedure or 2 days before a major surgical procedure. She may resume anticoagulation at discussion of her surgeon.  She will continue to see hematology annually.     MARLON Bradshaw, NP-BC  Women's Health Nurse Practitioner  Division of Gynecologic Oncology  [facility]      CC  Patient Care Team:  Morelia Ayon NP as PCP - General

## 2023-06-01 NOTE — NURSING NOTE
Chief Complaint   Patient presents with     Blood Draw     VPT blood draw by lab RN     Olesya Lao, RN

## 2023-06-06 ENCOUNTER — MYC MEDICAL ADVICE (OUTPATIENT)
Dept: FAMILY MEDICINE | Facility: CLINIC | Age: 68
End: 2023-06-06
Payer: MEDICARE

## 2023-06-06 DIAGNOSIS — Z86.718 PERSONAL HISTORY OF DVT (DEEP VEIN THROMBOSIS): ICD-10-CM

## 2023-06-06 DIAGNOSIS — Z91.89 AT HIGH RISK FOR BREAST CANCER: ICD-10-CM

## 2023-06-06 RX ORDER — TAMOXIFEN CITRATE 10 MG/1
TABLET ORAL
Qty: 45 TABLET | Refills: 3 | Status: SHIPPED | OUTPATIENT
Start: 2023-06-06 | End: 2023-10-24

## 2023-06-06 NOTE — TELEPHONE ENCOUNTER
Pending Prescriptions:                       Disp   Refills    rivaroxaban ANTICOAGULANT (XARELTO) 20 MG*30 tab*             Sig: Take 1 tablet (20 mg) by mouth daily (with           dinner)    LAST VISIT: 5/24/2023     NOTES: Patient called 6/6/2023 and asked for a new rx for Rivaroxaban. Would like   to have a new rx with us.     Thank you,  Susannah Wyatt, Pharmacy Coordinator   [facility]  [phone]

## 2023-06-06 NOTE — PROGRESS NOTES
Medication dose change today: tamoxifen should now be 10 mg daily.  Dose adjustment due to increasing dose on selective serotonin reuptake inhibitor and potential lowering of efficacy of tamoxifen. SHOULD CONTINUE NEW DOSE THROUGH END OF November 2024.

## 2023-06-06 NOTE — PATIENT INSTRUCTIONS
Dose adjustment today to 10 mg daily of tamoxifen due to increasing dose on selective serotonin reuptake inhibitor.    Continue 10 mg daily dose through end of November 2024.

## 2023-06-20 ENCOUNTER — IMMUNIZATION (OUTPATIENT)
Dept: NURSING | Facility: CLINIC | Age: 68
End: 2023-06-20
Payer: MEDICARE

## 2023-06-20 PROCEDURE — 0124A COVID-19 BIVALENT 12+ (PFIZER): CPT

## 2023-06-20 PROCEDURE — 91312 COVID-19 BIVALENT 12+ (PFIZER): CPT

## 2023-07-10 ENCOUNTER — MYC MEDICAL ADVICE (OUTPATIENT)
Dept: FAMILY MEDICINE | Facility: CLINIC | Age: 68
End: 2023-07-10
Payer: COMMERCIAL

## 2023-07-10 DIAGNOSIS — E78.5 HYPERLIPIDEMIA LDL GOAL <100: ICD-10-CM

## 2023-07-10 NOTE — TELEPHONE ENCOUNTER
My chart response sent noting that refills have been sent.  Mago Rodriguez RN  [facility]

## 2023-07-11 RX ORDER — ATORVASTATIN CALCIUM 80 MG/1
TABLET, FILM COATED ORAL
Qty: 90 TABLET | Refills: 3 | Status: SHIPPED | OUTPATIENT
Start: 2023-07-11 | End: 2024-04-09

## 2023-07-11 NOTE — TELEPHONE ENCOUNTER
Atorvastatin sent again by this RN as  per Melly's intention as Vivian told her the did not receive it    Erin Orozco RN, BSN  [facility]

## 2023-07-31 ENCOUNTER — MYC MEDICAL ADVICE (OUTPATIENT)
Dept: FAMILY MEDICINE | Facility: CLINIC | Age: 68
End: 2023-07-31
Payer: COMMERCIAL

## 2023-07-31 ASSESSMENT — ENCOUNTER SYMPTOMS
HEMATURIA: 0
FEVER: 0
WEAKNESS: 0
DIZZINESS: 1
BREAST MASS: 0
PARESTHESIAS: 0
ARTHRALGIAS: 1
JOINT SWELLING: 0
HEARTBURN: 0
SORE THROAT: 0
HEADACHES: 0
EYE PAIN: 0
CHILLS: 0
DYSURIA: 0
NAUSEA: 0
NERVOUS/ANXIOUS: 1
CONSTIPATION: 0
SHORTNESS OF BREATH: 0
ABDOMINAL PAIN: 0
COUGH: 0
DIARRHEA: 0
PALPITATIONS: 0
MYALGIAS: 0
FREQUENCY: 0
HEMATOCHEZIA: 0

## 2023-07-31 ASSESSMENT — PATIENT HEALTH QUESTIONNAIRE - PHQ9
10. IF YOU CHECKED OFF ANY PROBLEMS, HOW DIFFICULT HAVE THESE PROBLEMS MADE IT FOR YOU TO DO YOUR WORK, TAKE CARE OF THINGS AT HOME, OR GET ALONG WITH OTHER PEOPLE: SOMEWHAT DIFFICULT
SUM OF ALL RESPONSES TO PHQ QUESTIONS 1-9: 4
SUM OF ALL RESPONSES TO PHQ QUESTIONS 1-9: 4

## 2023-07-31 ASSESSMENT — ACTIVITIES OF DAILY LIVING (ADL): CURRENT_FUNCTION: HOUSEWORK REQUIRES ASSISTANCE

## 2023-08-01 ENCOUNTER — OFFICE VISIT (OUTPATIENT)
Dept: FAMILY MEDICINE | Facility: CLINIC | Age: 68
End: 2023-08-01
Payer: MEDICARE

## 2023-08-01 VITALS
SYSTOLIC BLOOD PRESSURE: 108 MMHG | RESPIRATION RATE: 20 BRPM | TEMPERATURE: 97.3 F | HEART RATE: 100 BPM | WEIGHT: 201.9 LBS | BODY MASS INDEX: 37.15 KG/M2 | DIASTOLIC BLOOD PRESSURE: 74 MMHG | HEIGHT: 62 IN | OXYGEN SATURATION: 98 %

## 2023-08-01 DIAGNOSIS — Z00.00 ENCOUNTER FOR MEDICARE ANNUAL WELLNESS EXAM: Primary | ICD-10-CM

## 2023-08-01 DIAGNOSIS — F33.0 MAJOR DEPRESSIVE DISORDER, RECURRENT EPISODE, MILD (H): ICD-10-CM

## 2023-08-01 DIAGNOSIS — N18.31 STAGE 3A CHRONIC KIDNEY DISEASE (H): ICD-10-CM

## 2023-08-01 DIAGNOSIS — E66.01 MORBID OBESITY (H): ICD-10-CM

## 2023-08-01 DIAGNOSIS — Z87.440 HISTORY OF RECURRENT URINARY TRACT INFECTION: ICD-10-CM

## 2023-08-01 DIAGNOSIS — E11.9 TYPE 2 DIABETES MELLITUS WITHOUT COMPLICATION, WITHOUT LONG-TERM CURRENT USE OF INSULIN (H): ICD-10-CM

## 2023-08-01 DIAGNOSIS — Z78.0 ASYMPTOMATIC MENOPAUSAL STATE: ICD-10-CM

## 2023-08-01 DIAGNOSIS — J01.40 ACUTE NON-RECURRENT PANSINUSITIS: ICD-10-CM

## 2023-08-01 DIAGNOSIS — Z86.718 PERSONAL HISTORY OF DVT (DEEP VEIN THROMBOSIS): ICD-10-CM

## 2023-08-01 DIAGNOSIS — T45.1X5A PERIPHERAL NEUROPATHY DUE TO CHEMOTHERAPY (H): ICD-10-CM

## 2023-08-01 DIAGNOSIS — G62.0 PERIPHERAL NEUROPATHY DUE TO CHEMOTHERAPY (H): ICD-10-CM

## 2023-08-01 DIAGNOSIS — I25.10 ATHEROSCLEROSIS OF CORONARY ARTERY OF NATIVE HEART WITHOUT ANGINA PECTORIS, UNSPECIFIED VESSEL OR LESION TYPE: ICD-10-CM

## 2023-08-01 PROCEDURE — 99214 OFFICE O/P EST MOD 30 MIN: CPT | Mod: 25 | Performed by: NURSE PRACTITIONER

## 2023-08-01 PROCEDURE — G0402 INITIAL PREVENTIVE EXAM: HCPCS | Performed by: NURSE PRACTITIONER

## 2023-08-01 RX ORDER — NITROFURANTOIN 25; 75 MG/1; MG/1
CAPSULE ORAL
Qty: 30 CAPSULE | Status: SHIPPED | OUTPATIENT
Start: 2023-08-01

## 2023-08-01 RX ORDER — METFORMIN HCL 500 MG
TABLET, EXTENDED RELEASE 24 HR ORAL
Qty: 180 TABLET | Refills: 3 | Status: SHIPPED | OUTPATIENT
Start: 2023-08-01 | End: 2024-07-19

## 2023-08-01 ASSESSMENT — ENCOUNTER SYMPTOMS
NAUSEA: 0
COUGH: 0
CHILLS: 0
SHORTNESS OF BREATH: 0
ABDOMINAL PAIN: 0
HEADACHES: 0
JOINT SWELLING: 0
FREQUENCY: 0
HEMATOCHEZIA: 0
EYE PAIN: 0
MYALGIAS: 0
SORE THROAT: 0
BREAST MASS: 0
HEARTBURN: 0
PARESTHESIAS: 0
ARTHRALGIAS: 1
DIARRHEA: 0
CONSTIPATION: 0
PALPITATIONS: 0
DYSURIA: 0
DIZZINESS: 1
HEMATURIA: 0
FEVER: 0
WEAKNESS: 0
NERVOUS/ANXIOUS: 1

## 2023-08-01 ASSESSMENT — PAIN SCALES - GENERAL: PAINLEVEL: NO PAIN (0)

## 2023-08-01 ASSESSMENT — ACTIVITIES OF DAILY LIVING (ADL): CURRENT_FUNCTION: HOUSEWORK REQUIRES ASSISTANCE

## 2023-08-01 NOTE — PROGRESS NOTES
"SUBJECTIVE:   Hafsa is a 68 year old who presents for Preventive Visit.      8/1/2023     2:38 PM   Additional Questions   Roomed by Adela   Accompanied by Self       Are you in the first 12 months of your Medicare coverage? Right Eye 20/32 Left eye 20/40 Both 20/25    Healthy Habits:     In general, how would you rate your overall health?  Good    Frequency of exercise:  6-7 days/week    Duration of exercise:  30-45 minutes    Do you usually eat at least 4 servings of fruit and vegetables a day, include whole grains    & fiber and avoid regularly eating high fat or \"junk\" foods?  Yes    Taking medications regularly:  Yes    Medication side effects:  Other    Ability to successfully perform activities of daily living:  Housework requires assistance    Home Safety:  Lack of grab bars in the bathroom    Hearing Impairment:  Difficulty following a conversation in a noisy restaurant or crowded room    In the past 6 months, have you been bothered by leaking of urine? Yes    In general, how would you rate your overall mental or emotional health?  Fair    Additional concerns today:  Yes    Concerned about height loss, wants a bone density test.  She as having some low back pain months ago, better now.  Had superficial thrombophlebitis, went back on Xarelto.  She is back to seeing her psychiatrist, trying to adjust Adderall dose.  Blood sugars have been stable.            Have you ever done Advance Care Planning? (For example, a Health Directive, POLST, or a discussion with a medical provider or your loved ones about your wishes): Given to Pt today        Fall risk  Fallen 2 or more times in the past year?: No  Any fall with injury in the past year?: Yes    Cognitive Screening   1) Repeat 3 items (Leader, Season, Table)    2) Clock draw: NORMAL  3) 3 item recall: Recalls 2 objects   Results: NORMAL clock, 1-2 items recalled: COGNITIVE IMPAIRMENT LESS LIKELY    Mini-CogTM Copyright S Siobhan. Licensed by the author for use in " [facility]; reprinted with permission (alexi@[facility]). All rights reserved.      Do you have sleep apnea, excessive snoring or daytime drowsiness? : yes    Reviewed and updated as needed this visit by clinical staff   Tobacco  Allergies  Meds              Reviewed and updated as needed this visit by Provider                 Social History     Tobacco Use    Smoking status: Never    Smokeless tobacco: Never   Substance Use Topics    Alcohol use: Yes     Comment: Very infrequent, a bit of wine or beer 2x per month maybe             7/31/2023     5:31 PM   Alcohol Use   Prescreen: >3 drinks/day or >7 drinks/week? No     Do you have a current opioid prescription? No  Do you use any other controlled substances or medications that are not prescribed by a provider? Cannabis sometimes               Current providers sharing in care for this patient include:   Patient Care Team:  Morelia Ayon NP as PCP - Karla Perez RN as Continuity Care Coordinator (Gynecologic Oncology)  Nabeel Dick MD as MD (Urology)  Javier Gregorio MD as MD (Urology)  Lola Vasquez, PhD LP as Psychologist (Psychology)  Morelia Ayon NP as Assigned PCP  Leland Mercado MD as MD (Urology)  Iris Dalton, APRN CNP as Assigned Cancer Care Provider  Norris Vela MD as MD (Critical Care)  Morelia Ayon NP as Referring Physician (Nurse Practitioner - Family)  Andreia Rosales PA-C as Physician Assistant (Dermatology)  Marc Fregoso MD as Assigned Surgical Provider    The following health maintenance items are reviewed in Epic and correct as of today:  Health Maintenance   Topic Date Due    ZOSTER IMMUNIZATION (1 of 2) Never done    EYE EXAM  11/28/2019    MEDICARE ANNUAL WELLNESS VISIT  12/01/2021    ADVANCE CARE PLANNING  12/15/2022    A1C  01/06/2023    LIPID  07/06/2023    MICROALBUMIN  07/06/2023    DIABETIC FOOT EXAM  07/06/2023    ANNUAL REVIEW OF  ORDERS   07/06/2023    BMP  08/18/2023    INFLUENZA VACCINE (1) 09/01/2023    HEMOGLOBIN  11/14/2023    PHQ-9  02/01/2024    FALL RISK ASSESSMENT  08/01/2024    MAMMO SCREENING  10/27/2024    COLORECTAL CANCER SCREENING  01/06/2025    DTAP/TDAP/TD IMMUNIZATION (2 - Td or Tdap) 02/04/2026    DEXA  01/09/2027    HEPATITIS C SCREENING  Completed    DEPRESSION ACTION PLAN  Completed    Pneumococcal Vaccine: 65+ Years  Completed    URINALYSIS  Completed    COVID-19 Vaccine  Completed    IPV IMMUNIZATION  Aged Out    MENINGITIS IMMUNIZATION  Aged Out           FHS-7:       10/13/2021     8:25 AM 10/21/2022     2:43 PM   Breast CA Risk Assessment (FHS-7)   Did any of your first-degree relatives have breast or ovarian cancer? Yes Yes   Did any of your relatives have bilateral breast cancer? No No   Did any man in your family have breast cancer? No No   Did any woman in your family have breast and ovarian cancer? No No   Did any woman in your family have breast cancer before age 50 y? No No   Do you have 2 or more relatives with breast and/or ovarian cancer? No No   Do you have 2 or more relatives with breast and/or bowel cancer? Yes Yes         Pertinent mammograms are reviewed under the imaging tab.    Review of Systems   Constitutional:  Negative for chills and fever.   HENT:  Negative for congestion, ear pain, hearing loss and sore throat.    Eyes:  Negative for pain and visual disturbance.   Respiratory:  Negative for cough and shortness of breath.    Cardiovascular:  Positive for peripheral edema. Negative for chest pain and palpitations.   Gastrointestinal:  Negative for abdominal pain, constipation, diarrhea, heartburn, hematochezia and nausea.   Breasts:  Negative for tenderness, breast mass and discharge.   Genitourinary:  Negative for dysuria, frequency, genital sores, hematuria, pelvic pain, urgency, vaginal bleeding and vaginal discharge.   Musculoskeletal:  Positive for arthralgias. Negative for joint swelling and  "myalgias.   Skin:  Negative for rash.   Neurological:  Positive for dizziness. Negative for weakness, headaches and paresthesias.   Psychiatric/Behavioral:  Negative for mood changes. The patient is nervous/anxious.          OBJECTIVE:   /74 (BP Location: Right arm, Patient Position: Sitting, Cuff Size: Adult Regular)   Pulse 100   Temp 97.3  F (36.3  C) (Temporal)   Resp 20   Ht 1.575 m (5' 2.01\")   Wt 91.6 kg (201 lb 14.4 oz)   LMP 01/01/2009   SpO2 98%   BMI 36.92 kg/m   Estimated body mass index is 36.92 kg/m  as calculated from the following:    Height as of this encounter: 1.575 m (5' 2.01\").    Weight as of this encounter: 91.6 kg (201 lb 14.4 oz).  Physical Exam  GENERAL: healthy, alert and no distress  EYES: Eyes grossly normal to inspection, PERRL and conjunctivae and sclerae normal  HENT: ear canals and TM's normal, nose and mouth without ulcers or lesions  NECK: no adenopathy, no asymmetry, masses, or scars and thyroid normal to palpation  RESP: lungs clear to auscultation - no rales, rhonchi or wheezes  CV: regular rate and rhythm, normal S1 S2, no S3 or S4, no murmur, click or rub, no peripheral edema and peripheral pulses strong  ABDOMEN: soft, nontender, no hepatosplenomegaly, no masses and bowel sounds normal  MS: no gross musculoskeletal defects noted, no edema  SKIN: no suspicious lesions or rashes  NEURO: Normal strength and tone, mentation intact and speech normal  PSYCH: mentation appears normal, affect normal/bright  Diabetic foot exam: normal DP and PT pulses, no trophic changes or ulcerative lesions, normal sensory exam, and normal monofilament exam        ASSESSMENT / PLAN:   (Z00.00) Encounter for Medicare annual wellness exam  (primary encounter diagnosis)  Comment:   Plan:     (E11.9) Type 2 diabetes mellitus without complication, without long-term current use of insulin (H)  Comment: at goal  Plan: HEMOGLOBIN A1C, Lipid panel reflex to direct         LDL Fasting, metFORMIN " "(GLUCOPHAGE XR) 500 MG         24 hr tablet        She will return for fasting labs.      (I25.10) Atherosclerosis of coronary artery of native heart without angina pectoris, unspecified vessel or lesion type  Comment: stable  Plan: She will continue to follow up with her cardiologist.     (N18.31) Stage 3a chronic kidney disease (H)  Comment: stable; single kidney  Plan: Albumin Random Urine Quantitative with Creat         Ratio, BASIC METABOLIC PANEL        She will continue to follow up with her urologist.     (J01.40) Acute non-recurrent pansinusitis  Comment:   Plan: amoxicillin-clavulanate (AUGMENTIN) 875-125 MG         tablet        Prescription to have on file if needed.     (Z78.0) Asymptomatic menopausal state  Comment:   Plan: DX Hip/Pelvis/Spine            (G62.0,  T45.1X5A) 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)  Comment: stable  Plan: For new mild and intermittent symptoms in feet, she will let me know if symptoms worsen and I will do a PT referral.      (F33.0) Major depressive disorder, recurrent episode, mild (H)  Comment: stable  Plan: She will continue to follow up with her psychiatrist.    (E66.01) Morbid obesity (H)  Comment:   Plan: She will continue to work on diet and exercise.     (Z87.440) History of recurrent urinary tract infection  Comment:   Plan: nitroFURantoin macrocrystal-monohydrate         (MACROBID) 100 MG capsule            (Z86.718) Personal history of DVT (deep vein thrombosis)  Comment:   Plan: Orthotics and Prosthetics DME Compression; Leg;        Thigh; Left; 20/30 mmHg; 4 Pair                  COUNSELING:  Reviewed preventive health counseling, as reflected in patient instructions      BMI:   Estimated body mass index is 36.92 kg/m  as calculated from the following:    Height as of this encounter: 1.575 m (5' 2.01\").    Weight as of this encounter: 91.6 kg (201 lb 14.4 oz).   Weight management plan: Discussed healthy diet and exercise guidelines      She reports that " "she has never smoked. She has never used smokeless tobacco.      Appropriate preventive services were discussed with this patient, including applicable screening as appropriate for cardiovascular disease, diabetes, osteopenia/osteoporosis, and glaucoma.  As appropriate for age/gender, discussed screening for colorectal cancer, prostate cancer, breast cancer, and cervical cancer. Checklist reviewing preventive services available has been given to the patient.    Reviewed patients plan of care and provided an AVS. The Basic Care Plan (routine screening as documented in Health Maintenance) for Hafsa meets the Care Plan requirement. This Care Plan has been established and reviewed with the Patient.          Morelia Ayon NP  [facility]    Identified Health Risks:  Answers submitted by the patient for this visit:  Patient Health Questionnaire (Submitted on 7/31/2023)  If you checked off any problems, how difficult have these problems made it for you to do your work, take care of things at home, or get along with other people?: Somewhat difficult  PHQ9 TOTAL SCORE: 4  Annual Preventive Visit (Submitted on 7/31/2023)  Chief Complaint: Annual Exam:  In general, how would you rate your overall physical health?: good  Frequency of exercise:: 6-7 days/week  Do you usually eat at least 4 servings of fruit and vegetables a day, include whole grains & fiber, and avoid regularly eating high fat or \"junk\" foods? : Yes  Taking medications regularly:: Yes  Medication side effects:: Other  Activities of Daily Living: housework requires assistance  Home safety: lack of grab bars in the bathroom  Hearing Impairment:: difficulty following a conversation in a noisy restaurant or crowded room  In the past 6 months, have you been bothered by leaking of urine?: Yes  abdominal pain: No  Blood in stool: No  Blood in urine: No  chest pain: No  chills: No  congestion: No  constipation: No  cough: No  diarrhea: " No  dizziness: Yes  ear pain: No  eye pain: No  nervous/anxious: Yes  fever: No  frequency: No  genital sores: No  headaches: No  hearing loss: No  heartburn: No  arthralgias: Yes  joint swelling: No  peripheral edema: Yes  mood changes: No  myalgias: No  nausea: No  dysuria: No  palpitations: No  Skin sensation changes: No  sore throat: No  urgency: No  rash: No  shortness of breath: No  visual disturbance: No  weakness: No  pelvic pain: No  vaginal bleeding: No  vaginal discharge: No  tenderness: No  breast mass: No  breast discharge: No  In general, how would you rate your overall mental or emotional health?: fair  Additional concerns today:: Yes  Exercise outside of work (Submitted on 7/31/2023)  Chief Complaint: Annual Exam:  Duration of exercise:: 30-45 minutes    DME (Durable Medical Equipment) Orders and Documentation  Orders Placed This Encounter   Procedures    Orthotics and Prosthetics DME Compression; Leg; Thigh; Left; 20/30 mmHg; 4 Pair        The patient was assessed and it was determined the patient is in need of the following listed DME Supplies/Equipment. Please complete supporting documentation below to demonstrate medical necessity.

## 2023-08-01 NOTE — PATIENT INSTRUCTIONS
Patient Education   Personalized Prevention Plan  You are due for the preventive services outlined below.  Your care team is available to assist you in scheduling these services.  If you have already completed any of these items, please share that information with your care team to update in your medical record.  Health Maintenance Due   Topic Date Due     Zoster (Shingles) Vaccine (1 of 2) Never done     Eye Exam  11/28/2019     Annual Wellness Visit  12/01/2021     Discuss Advance Care Planning  12/15/2022     A1C Lab  01/06/2023     Cholesterol Lab  07/06/2023     Kidney Microalbumin Urine Test  07/06/2023     Diabetic Foot Exam  07/06/2023     ANNUAL REVIEW OF HM ORDERS  07/06/2023     Basic Metabolic Panel  08/18/2023

## 2023-08-02 NOTE — TELEPHONE ENCOUNTER
See RN response to patient's mychart message re:multiple mychart messages sent    TIMUR OwensN RN  [facility]

## 2023-08-04 ENCOUNTER — MYC MEDICAL ADVICE (OUTPATIENT)
Dept: FAMILY MEDICINE | Facility: CLINIC | Age: 68
End: 2023-08-04

## 2023-08-04 ENCOUNTER — LAB (OUTPATIENT)
Dept: LAB | Facility: CLINIC | Age: 68
End: 2023-08-04
Payer: MEDICARE

## 2023-08-04 DIAGNOSIS — E11.9 TYPE 2 DIABETES MELLITUS WITHOUT COMPLICATION, WITHOUT LONG-TERM CURRENT USE OF INSULIN (H): ICD-10-CM

## 2023-08-04 DIAGNOSIS — N18.31 STAGE 3A CHRONIC KIDNEY DISEASE (H): ICD-10-CM

## 2023-08-04 LAB
ANION GAP SERPL CALCULATED.3IONS-SCNC: 12 MMOL/L (ref 7–15)
BUN SERPL-MCNC: 15.4 MG/DL (ref 8–23)
CALCIUM SERPL-MCNC: 9.3 MG/DL (ref 8.8–10.2)
CHLORIDE SERPL-SCNC: 105 MMOL/L (ref 98–107)
CHOLEST SERPL-MCNC: 170 MG/DL
CREAT SERPL-MCNC: 1.15 MG/DL (ref 0.51–0.95)
CREAT UR-MCNC: 124 MG/DL
DEPRECATED HCO3 PLAS-SCNC: 23 MMOL/L (ref 22–29)
GFR SERPL CREATININE-BSD FRML MDRD: 51 ML/MIN/1.73M2
GLUCOSE SERPL-MCNC: 114 MG/DL (ref 70–99)
HBA1C MFR BLD: 6.5 % (ref 0–5.6)
HDLC SERPL-MCNC: 55 MG/DL
LDLC SERPL CALC-MCNC: 86 MG/DL
MICROALBUMIN UR-MCNC: <12 MG/L
MICROALBUMIN/CREAT UR: NORMAL MG/G{CREAT}
NONHDLC SERPL-MCNC: 115 MG/DL
POTASSIUM SERPL-SCNC: 4.4 MMOL/L (ref 3.4–5.3)
SODIUM SERPL-SCNC: 140 MMOL/L (ref 136–145)
TRIGL SERPL-MCNC: 145 MG/DL

## 2023-08-04 PROCEDURE — 82043 UR ALBUMIN QUANTITATIVE: CPT

## 2023-08-04 PROCEDURE — 83036 HEMOGLOBIN GLYCOSYLATED A1C: CPT

## 2023-08-04 PROCEDURE — 36415 COLL VENOUS BLD VENIPUNCTURE: CPT

## 2023-08-04 PROCEDURE — 80048 BASIC METABOLIC PNL TOTAL CA: CPT

## 2023-08-04 PROCEDURE — 80061 LIPID PANEL: CPT

## 2023-08-04 PROCEDURE — 82570 ASSAY OF URINE CREATININE: CPT

## 2023-09-01 ENCOUNTER — OFFICE VISIT (OUTPATIENT)
Dept: URGENT CARE | Facility: URGENT CARE | Age: 68
End: 2023-09-01
Payer: MEDICARE

## 2023-09-01 VITALS
WEIGHT: 201 LBS | BODY MASS INDEX: 36.75 KG/M2 | HEART RATE: 87 BPM | OXYGEN SATURATION: 97 % | DIASTOLIC BLOOD PRESSURE: 71 MMHG | SYSTOLIC BLOOD PRESSURE: 107 MMHG | TEMPERATURE: 97.3 F | RESPIRATION RATE: 18 BRPM

## 2023-09-01 DIAGNOSIS — D64.9 ANEMIA, UNSPECIFIED TYPE: ICD-10-CM

## 2023-09-01 DIAGNOSIS — R30.0 DYSURIA: Primary | ICD-10-CM

## 2023-09-01 DIAGNOSIS — Z79.01 LONG TERM CURRENT USE OF ANTICOAGULANT THERAPY: ICD-10-CM

## 2023-09-01 DIAGNOSIS — N18.9 CHRONIC KIDNEY DISEASE, UNSPECIFIED CKD STAGE: ICD-10-CM

## 2023-09-01 DIAGNOSIS — R31.0 GROSS HEMATURIA: ICD-10-CM

## 2023-09-01 DIAGNOSIS — E11.9 TYPE 2 DIABETES MELLITUS WITHOUT COMPLICATION, UNSPECIFIED WHETHER LONG TERM INSULIN USE (H): ICD-10-CM

## 2023-09-01 LAB
BACTERIA #/AREA URNS HPF: ABNORMAL /HPF
BASOPHILS # BLD AUTO: 0 10E3/UL (ref 0–0.2)
BASOPHILS NFR BLD AUTO: 0 %
EOSINOPHIL # BLD AUTO: 0.1 10E3/UL (ref 0–0.7)
EOSINOPHIL NFR BLD AUTO: 1 %
ERYTHROCYTE [DISTWIDTH] IN BLOOD BY AUTOMATED COUNT: 13.5 % (ref 10–15)
HCT VFR BLD AUTO: 34.5 % (ref 35–47)
HGB BLD-MCNC: 11.3 G/DL (ref 11.7–15.7)
IMM GRANULOCYTES # BLD: 0 10E3/UL
IMM GRANULOCYTES NFR BLD: 0 %
LYMPHOCYTES # BLD AUTO: 2.1 10E3/UL (ref 0.8–5.3)
LYMPHOCYTES NFR BLD AUTO: 31 %
MCH RBC QN AUTO: 28.5 PG (ref 26.5–33)
MCHC RBC AUTO-ENTMCNC: 32.8 G/DL (ref 31.5–36.5)
MCV RBC AUTO: 87 FL (ref 78–100)
MONOCYTES # BLD AUTO: 0.5 10E3/UL (ref 0–1.3)
MONOCYTES NFR BLD AUTO: 8 %
NEUTROPHILS # BLD AUTO: 4 10E3/UL (ref 1.6–8.3)
NEUTROPHILS NFR BLD AUTO: 60 %
PLATELET # BLD AUTO: 241 10E3/UL (ref 150–450)
RBC # BLD AUTO: 3.96 10E6/UL (ref 3.8–5.2)
RBC #/AREA URNS AUTO: ABNORMAL /HPF
SQUAMOUS #/AREA URNS AUTO: ABNORMAL /LPF
WBC # BLD AUTO: 6.7 10E3/UL (ref 4–11)
WBC #/AREA URNS AUTO: ABNORMAL /HPF

## 2023-09-01 PROCEDURE — 87086 URINE CULTURE/COLONY COUNT: CPT | Performed by: PHYSICIAN ASSISTANT

## 2023-09-01 PROCEDURE — 36415 COLL VENOUS BLD VENIPUNCTURE: CPT | Performed by: PHYSICIAN ASSISTANT

## 2023-09-01 PROCEDURE — 81015 MICROSCOPIC EXAM OF URINE: CPT | Performed by: PHYSICIAN ASSISTANT

## 2023-09-01 PROCEDURE — 85025 COMPLETE CBC W/AUTO DIFF WBC: CPT | Performed by: PHYSICIAN ASSISTANT

## 2023-09-01 PROCEDURE — 99214 OFFICE O/P EST MOD 30 MIN: CPT | Performed by: PHYSICIAN ASSISTANT

## 2023-09-01 RX ORDER — PHENAZOPYRIDINE HYDROCHLORIDE 200 MG/1
200 TABLET, FILM COATED ORAL 3 TIMES DAILY PRN
Qty: 9 TABLET | Refills: 0 | Status: SHIPPED | OUTPATIENT
Start: 2023-09-01

## 2023-09-01 RX ORDER — CIPROFLOXACIN 250 MG/1
250 TABLET, FILM COATED ORAL 2 TIMES DAILY
Qty: 20 TABLET | Refills: 0 | Status: SHIPPED | OUTPATIENT
Start: 2023-09-01 | End: 2023-09-11

## 2023-09-01 NOTE — PROGRESS NOTES
Assessment & Plan     Dysuria    You have been diagnosed with a UTI.  This is one of the most common infections in women because women have a shorter urethra than men. Bacteria have a shorter distance to travel to reach the bladder.. Women who have gone through menopause also lose the protection from estrogen that lowers the chance of getting a UTI. And some women are at higher risk because of their genes. The most common cause of bladder infections is bacteria from the bowels. The bacteria get onto the skin around the opening of the urethra. From there, they can get into the urine. Then they travel up to the bladder, causing inflammation and infection.  Make sure you urinate after sex, drink plenty of fluids and do not hold in your urine.  We have started you on antibiotics for infection and we are awaiting urine culture results, if there is antibiotic resistance on the culture we will call you and switch antibiotics.     Urine culture pending  Start on cipro  - UA Microscopic with Reflex to Culture  - Urine Culture  - ciprofloxacin (CIPRO) 250 MG tablet; Take 1 tablet (250 mg) by mouth 2 times daily for 10 days  - phenazopyridine (PYRIDIUM) 200 MG tablet; Take 1 tablet (200 mg) by mouth 3 times daily as needed for irritation    Chronic kidney disease, unspecified CKD stage    Estimated Creatinine Clearance: 48.5 mL/min (A) (based on SCr of 1.15 mg/dL (H)).  Will use lower dose cipro    Type 2 diabetes mellitus without complication, unspecified whether long term insulin use (H)    Monitor blood sugars as increased blood sugars can lead to worsening infections    Gross hematuria    Strain urine for renal stone  Collect and return to clinic if found  Increase oral fluids  Urine culture pending  CT scan ordered for patient so that if she gets flank pain she can have this done    - CBC with platelets and differential; Future  - CT Abdomen Pelvis w/o Contrast; Future  - CBC with platelets and differential    Anemia,  unspecified type    Start iron supplements  Follow up with PCP    Review of external notes as documented elsewhere in note     At today's visit with Hafsa Corona , we discussed results, diagnosis, medications and formulated a plan.  We also discussed red flags for immediate return to clinic/ER, as well as indications for follow up with PCP if not improved in 3 days. Patient understood and agreed to plan. Hafsa Corona was discharged with stable vitals and has no further questions.       No follow-ups on file.    Bc Quijano, [facility], PA-C  [facility] [place]    Aiyana Pereyra is a 68 year old, presenting for the following health issues:  Urgent Care and UTI (Kidney stones or UTI last night. )      HPI   Review of Systems   Constitutional, HEENT, cardiovascular, pulmonary, gi and gu systems are negative, except as otherwise noted.      Objective    /71   Pulse 87   Temp 97.3  F (36.3  C) (Temporal)   Resp 18   Wt 91.2 kg (201 lb)   LMP 01/01/2009   SpO2 97%   BMI 36.75 kg/m    Body mass index is 36.75 kg/m .  Physical Exam   GENERAL: healthy, alert and no distress  RESP: lungs clear to auscultation - no rales, rhonchi or wheezes  CV: regular rate and rhythm, normal S1 S2, no S3 or S4, no murmur, click or rub, no peripheral edema and peripheral pulses strong  ABDOMEN: tenderness suprapubic  MS: no gross musculoskeletal defects noted, no edema  SKIN: no suspicious lesions or rashes  NEURO: Normal strength and tone, mentation intact and speech normal  PSYCH: mentation appears normal, affect normal/bright        Results for orders placed or performed in visit on 09/01/23   UA Microscopic with Reflex to Culture     Status: Abnormal   Result Value Ref Range    Bacteria Urine Few (A) None Seen /HPF    RBC Urine 10-25 (A) 0-2 /HPF /HPF    WBC Urine 0-5 0-5 /HPF /HPF    Squamous Epithelials Urine Few (A) None Seen /LPF    Narrative    Urine Culture not indicated   CBC with platelets  and differential     Status: Abnormal   Result Value Ref Range    WBC Count 6.7 4.0 - 11.0 10e3/uL    RBC Count 3.96 3.80 - 5.20 10e6/uL    Hemoglobin 11.3 (L) 11.7 - 15.7 g/dL    Hematocrit 34.5 (L) 35.0 - 47.0 %    MCV 87 78 - 100 fL    MCH 28.5 26.5 - 33.0 pg    MCHC 32.8 31.5 - 36.5 g/dL    RDW 13.5 10.0 - 15.0 %    Platelet Count 241 150 - 450 10e3/uL    % Neutrophils 60 %    % Lymphocytes 31 %    % Monocytes 8 %    % Eosinophils 1 %    % Basophils 0 %    % Immature Granulocytes 0 %    Absolute Neutrophils 4.0 1.6 - 8.3 10e3/uL    Absolute Lymphocytes 2.1 0.8 - 5.3 10e3/uL    Absolute Monocytes 0.5 0.0 - 1.3 10e3/uL    Absolute Eosinophils 0.1 0.0 - 0.7 10e3/uL    Absolute Basophils 0.0 0.0 - 0.2 10e3/uL    Absolute Immature Granulocytes 0.0 <=0.4 10e3/uL   CBC with platelets and differential     Status: Abnormal    Narrative    The following orders were created for panel order CBC with platelets and differential.  Procedure                               Abnormality         Status                     ---------                               -----------         ------                     CBC with platelets and d...[[id number]]  Abnormal            Final result                 Please view results for these tests on the individual orders.

## 2023-09-03 LAB — BACTERIA UR CULT: NORMAL

## 2023-09-18 ENCOUNTER — OFFICE VISIT (OUTPATIENT)
Dept: URGENT CARE | Facility: URGENT CARE | Age: 68
End: 2023-09-18
Payer: MEDICARE

## 2023-09-18 VITALS
BODY MASS INDEX: 36.75 KG/M2 | HEART RATE: 72 BPM | SYSTOLIC BLOOD PRESSURE: 118 MMHG | TEMPERATURE: 97.8 F | OXYGEN SATURATION: 97 % | DIASTOLIC BLOOD PRESSURE: 74 MMHG | WEIGHT: 201 LBS

## 2023-09-18 DIAGNOSIS — J32.0 CHRONIC MAXILLARY SINUSITIS: ICD-10-CM

## 2023-09-18 DIAGNOSIS — L03.313 CELLULITIS OF CHEST WALL: ICD-10-CM

## 2023-09-18 DIAGNOSIS — L02.213 CUTANEOUS ABSCESS OF CHEST WALL: Primary | ICD-10-CM

## 2023-09-18 PROCEDURE — 99213 OFFICE O/P EST LOW 20 MIN: CPT | Performed by: PHYSICIAN ASSISTANT

## 2023-09-18 RX ORDER — FLUTICASONE PROPIONATE 50 MCG
SPRAY, SUSPENSION (ML) NASAL
Qty: 48 G | Refills: 2 | Status: SHIPPED | OUTPATIENT
Start: 2023-09-18 | End: 2024-07-18

## 2023-09-18 ASSESSMENT — ENCOUNTER SYMPTOMS: COLOR CHANGE: 1

## 2023-09-18 NOTE — PROGRESS NOTES
SUBJECTIVE:   aHfsa Corona is a 68 year old female presenting with a chief complaint of   Chief Complaint   Patient presents with    Urgent Care    Cyst     C/O cyst for 3 days       She is an established patient of [facility]. Patient presents with complaints with left breast erythema, fluid collection for a week.  Long history of infected sebaceous cysts.  No fevers, no drainage.  Treating with topical abx and heat.          Review of Systems   Skin:  Positive for color change.   All other systems reviewed and are negative.      Past Medical History:   Diagnosis Date    Anxiety state, unspecified     [id number]    Arthritis     vague, gradual, not treated really, knees feel it    At high risk for breast cancer 2019    30.3% lifetime risk by SHERON model    Attention deficit disorder without mention of hyperactivity     Cancer (H) 2017 and 2018    ovarian and kidney cancers, both treated well    Chronic rhinitis     Depressive disorder lifetime    plus Anxiety plus ADD. Escitalipram, therapy, ADD meds maybe    Heart disease     tachycardia and high cholesterol, managed    History of blood transfusion 2017    while in hospital for ovarian cancer    Hypertension     tho BP was too LOW last week. past 12 years Generally OK    Panic disorder without agoraphobia     Pure hypercholesterolemia     Lipitor    Tachycardia, unspecified     1999-2004    Type II or unspecified type diabetes mellitus without mention of complication, not stated as uncontrolled     diagnosed      Family History   Problem Relation Age of Onset    C.A.D. Mother     Hypertension Mother     Breast Cancer Mother     Psychotic Disorder Mother     Colon Cancer Mother     Cancer Mother         Bone cancer    Coronary Artery Disease Mother     Hyperlipidemia Mother         treated w. statins    Other Cancer Mother         bone/marrow, ,  of this    Depression Mother     Anxiety Disorder Mother     Mental Illness Mother          various undiagnosed types, but THERE    Obesity Mother     Bone Cancer Mother     Other - See Comments Mother         psychotic disease     C.A.D. Father     Diabetes Father     Hypertension Father     Cerebrovascular Disease Father         1984 or     Psychotic Disorder Father     Pancreatic Cancer Father     Coronary Artery Disease Father     Hyperlipidemia Father         treated w statins    Other Cancer Father         pancreatic, ,  of this    Depression Father     Mental Illness Father         likely PTSD and depression    Obesity Father     Other - See Comments Father         cerebrovascular disease, psychotic disease     Psychotic Disorder Sister         x2    Neurologic Disorder Sister     Hypertension Sister     Hyperlipidemia Sister         treated w statins    Depression Sister     Anxiety Disorder Sister     Obesity Sister     Rashes/Skin Problems Sister         precancerous lesion on leg    Psychotic Disorder Sister     Other - See Comments Sister         small kidney stone     Hypertension Sister     Hyperlipidemia Sister         treated w statins    Depression Sister     Anxiety Disorder Sister     Other - See Comments Sister         psychotic disease     Prostate Problems Brother         cleared     Hypertension Brother     Hyperlipidemia Brother         unsure if treated w statins    Depression Brother     Anxiety Disorder Brother     Mental Illness Brother         undiagnosed but THERE    Obesity Brother     Pacemaker Brother 70        bradycardia, sick sinus syndrome    Psychotic Disorder Brother         x2    Depression Brother         major depressive disorder and OCD    Anxiety Disorder Brother     Mental Illness Brother         not sure of diagnoses, treated    Hypertension Brother     Hyperlipidemia Brother     Prostate Cancer Brother     Depression Brother     Anxiety Disorder Brother     Other - See Comments Brother         psychotic disease     Psychotic Disorder  Maternal Grandmother         ?    Coronary Artery Disease Maternal Grandmother          of heart attack age 84?    Depression Maternal Grandmother     Psychotic Disorder Maternal Grandfather         ?    Psychotic Disorder Paternal Grandmother         Schizophernia    Coronary Artery Disease Paternal Grandmother          of heart attack, age 85?    Depression Paternal Grandmother         severe, but recovered    Mental Illness Paternal Grandmother         possible bipolar or other    Schizophrenia Paternal Grandmother     Psychotic Disorder Paternal Grandfather         ?    Respiratory Paternal Grandfather          of emphysema and smoking    Emphysema Paternal Grandfather     Cancer - colorectal No family hx of     Glaucoma No family hx of     Macular Degeneration No family hx of      Current Outpatient Medications   Medication Sig Dispense Refill    Acetaminophen (TYLENOL PO) Take 500 mg by mouth 2 tabs prn pain (monthly)      amoxicillin-clavulanate (AUGMENTIN) 875-125 MG tablet Take 1 tablet by mouth 2 times daily for 10 days 20 tablet 0    amoxicillin-clavulanate (AUGMENTIN) 875-125 MG tablet Take 1 tablet by mouth 2 times daily 20 tablet 0    amphetamine-dextroamphetamine (ADDERALL XR) 20 MG 24 hr capsule Take 20 mg by mouth daily      ASPIRIN NOT PRESCRIBED (INTENTIONAL) Please choose reason not prescribed from choices below.      atorvastatin (LIPITOR) 80 MG tablet TAKE 1 TABLET(80 MG) BY MOUTH DAILY 90 tablet 3    B Complex Vitamins (VITAMIN B-COMPLEX PO) Take 50 mg by mouth       CALCIUM-MAGNESIUM-VITAMIN D PO Take 2 tablets by mouth daily      Cholecalciferol (VITAMIN D) 1000 UNIT capsule Take 2,000 Units by mouth daily       EPINEPHrine (ANY BX GENERIC EQUIV) 0.3 MG/0.3ML injection 2-pack Inject 0.3 mLs (0.3 mg) into the muscle once as needed for anaphylaxis 0.3 mL PRN    escitalopram (LEXAPRO) 20 MG tablet Take 1 tablet (20 mg) by mouth daily 90 tablet 0    fluticasone (FLONASE) 50 MCG/ACT  nasal spray SHAKE LIQUID AND USE 2 SPRAYS IN EACH NOSTRIL DAILY 48 g 2    LORazepam (ATIVAN) 1 MG tablet Take 1 tablet (1 mg) by mouth every 6 hours as needed (nausea/vomiting, anxiety or sleep) 30 tablet 0    Magnesium Hydroxide (MAGNESIA PO) Take 500 mg by mouth      metFORMIN (GLUCOPHAGE XR) 500 MG 24 hr tablet TAKE 2 TABLETS BY MOUTH EVERY DAY WITH THE EVENING MEAL 180 tablet 3    Multiple Vitamins-Minerals (MULTIVITAMIN ADULT PO) Take 1 tablet by mouth daily      nitroFURantoin macrocrystal-monohydrate (MACROBID) 100 MG capsule TAKE 1 CAPSULE BY MOUTH AFTER INTERCOURSE AS NEEDED 30 capsule PRN    phenazopyridine (AZO) 97.5 MG tablet Take by mouth 3 times daily      phenazopyridine (PYRIDIUM) 200 MG tablet Take 1 tablet (200 mg) by mouth 3 times daily as needed for irritation 9 tablet 0    Probiotic Product (PRO-BIOTIC BLEND PO) Take 1 capsule by mouth daily Enzymatic Therapy-probiotic pearls      rivaroxaban ANTICOAGULANT (XARELTO) 20 MG TABS tablet Take 1 tablet (20 mg) by mouth daily (with dinner) 90 tablet 3    tamoxifen (NOLVADEX) 10 MG tablet Take one pill daily for a 10 mg dose. 45 tablet 3    traZODone (DESYREL) 50 MG tablet TAKE 1 TABLET  BY MOUTH EVERY NIGHT AS NEEDED FOR SLEEP 180 tablet PRN    Rivaroxaban ANTICOAGULANT 15 & 20 MG TBPK Starter Therapy Pack Take 15 mg by mouth 2 times daily (with meals) for 21 days, THEN 20 mg daily with food for 9 days. 51 each 0     Social History     Tobacco Use    Smoking status: Never     Passive exposure: Never    Smokeless tobacco: Never   Substance Use Topics    Alcohol use: Yes     Comment: Very infrequent, a bit of wine or beer 2x per month maybe       OBJECTIVE  /74   Pulse 72   Temp 97.8  F (36.6  C) (Tympanic)   Wt 91.2 kg (201 lb)   LMP 01/01/2009   SpO2 97%   BMI 36.75 kg/m      Physical Exam  Vitals reviewed.   Constitutional:       Appearance: Normal appearance. She is obese.   Eyes:      Extraocular Movements: Extraocular movements intact.       Conjunctiva/sclera: Conjunctivae normal.   Cardiovascular:      Rate and Rhythm: Normal rate.   Skin:     General: Skin is warm.      Findings: Erythema present.      Comments: Left breast, lateral aspect with a 5-6 cm area of erythema, warmth and a central 3 cm painful fluid collection.  Not buoyant.   Neurological:      Mental Status: She is alert.         Labs:  No results found. However, due to the size of the patient record, not all encounters were searched. Please check Results Review for a complete set of results.    X-Ray was not done.    ASSESSMENT:      ICD-10-CM    1. Cutaneous abscess of chest wall  L02.213 amoxicillin-clavulanate (AUGMENTIN) 875-125 MG tablet     Adult General Surg Referral      2. Cellulitis of chest wall  L03.313            Medical Decision Making:    Differential Diagnosis:  Infected sebaceous, abscess    Serious Comorbid Conditions:  Adult:   reviewed    PLAN:    Rx for augmentin.  Continue with warm compresses.  Per patient' request, referral to general surgery.  Discussed reasons to seek immediate medical attention.  Additionally if no improvement or worsening in one week, may follow up with PCP and/or UC.        Followup:    If not improving or if condition worsens, follow up with your Primary Care Provider, If not improving or if conditions worsens over the next 12-24 hours, go to the Emergency Department    There are no Patient Instructions on file for this visit.

## 2023-10-13 ENCOUNTER — MYC MEDICAL ADVICE (OUTPATIENT)
Dept: FAMILY MEDICINE | Facility: CLINIC | Age: 68
End: 2023-10-13
Payer: COMMERCIAL

## 2023-10-13 DIAGNOSIS — J01.40 ACUTE NON-RECURRENT PANSINUSITIS: ICD-10-CM

## 2023-10-13 NOTE — TELEPHONE ENCOUNTER
[facility]  Critical Care Medicine  Progress Note    Patient Name: Vicki Kumar  MRN: [id number]  Admission Date: 10/3/2019  Hospital Length of Stay: 9 days  Code Status: DNR  Attending Provider: Erlin Up MD  Primary Care Provider: Jimmie Fay Refill sent in.    [facility].     On arrival to the [facility] Mel Leger NP  Critical Care Medicine  [facility]

## 2023-10-20 ENCOUNTER — PRE VISIT (OUTPATIENT)
Dept: UROLOGY | Facility: CLINIC | Age: 68
End: 2023-10-20
Payer: COMMERCIAL

## 2023-10-24 ENCOUNTER — ANCILLARY PROCEDURE (OUTPATIENT)
Dept: MAMMOGRAPHY | Facility: CLINIC | Age: 68
End: 2023-10-24
Attending: CLINICAL NURSE SPECIALIST
Payer: MEDICARE

## 2023-10-24 ENCOUNTER — ONCOLOGY VISIT (OUTPATIENT)
Dept: ONCOLOGY | Facility: CLINIC | Age: 68
End: 2023-10-24
Attending: CLINICAL NURSE SPECIALIST
Payer: MEDICARE

## 2023-10-24 VITALS
SYSTOLIC BLOOD PRESSURE: 106 MMHG | RESPIRATION RATE: 16 BRPM | WEIGHT: 203.6 LBS | DIASTOLIC BLOOD PRESSURE: 74 MMHG | HEART RATE: 90 BPM | OXYGEN SATURATION: 96 % | TEMPERATURE: 98.7 F | BODY MASS INDEX: 37.23 KG/M2

## 2023-10-24 DIAGNOSIS — Z12.39 BREAST CANCER SCREENING, HIGH RISK PATIENT: ICD-10-CM

## 2023-10-24 DIAGNOSIS — Z80.3 FAMILY HISTORY OF MALIGNANT NEOPLASM OF BREAST: ICD-10-CM

## 2023-10-24 DIAGNOSIS — R92.30 DENSE BREAST: ICD-10-CM

## 2023-10-24 DIAGNOSIS — Z12.39 BREAST CANCER SCREENING, HIGH RISK PATIENT: Primary | ICD-10-CM

## 2023-10-24 DIAGNOSIS — Z12.31 ENCOUNTER FOR SCREENING MAMMOGRAM FOR MALIGNANT NEOPLASM OF BREAST: ICD-10-CM

## 2023-10-24 DIAGNOSIS — Z91.89 AT HIGH RISK FOR BREAST CANCER: ICD-10-CM

## 2023-10-24 PROBLEM — F41.0 PANIC DISORDER (EPISODIC PAROXYSMAL ANXIETY): Status: ACTIVE | Noted: 2017-07-12

## 2023-10-24 PROBLEM — R11.0 NAUSEA: Status: ACTIVE | Noted: 2017-07-13

## 2023-10-24 PROBLEM — I82.409 ACUTE EMBOLISM AND THROMBOSIS OF UNSPECIFIED DEEP VEINS OF UNSPECIFIED LOWER EXTREMITY (H): Status: ACTIVE | Noted: 2017-07-12

## 2023-10-24 PROBLEM — F32.0 MAJOR DEPRESSIVE DISORDER, SINGLE EPISODE, MILD (H): Status: ACTIVE | Noted: 2017-07-12

## 2023-10-24 PROBLEM — R10.30 LOWER ABDOMINAL PAIN, UNSPECIFIED: Status: ACTIVE | Noted: 2017-07-12

## 2023-10-24 PROBLEM — L02.419 CUTANEOUS ABSCESS OF LIMB, UNSPECIFIED: Status: ACTIVE | Noted: 2017-07-12

## 2023-10-24 PROBLEM — R10.2 PELVIC AND PERINEAL PAIN: Status: ACTIVE | Noted: 2017-07-12

## 2023-10-24 PROBLEM — J30.0 VASOMOTOR RHINITIS: Status: ACTIVE | Noted: 2017-07-12

## 2023-10-24 PROCEDURE — 99213 OFFICE O/P EST LOW 20 MIN: CPT | Performed by: CLINICAL NURSE SPECIALIST

## 2023-10-24 PROCEDURE — G0463 HOSPITAL OUTPT CLINIC VISIT: HCPCS | Performed by: CLINICAL NURSE SPECIALIST

## 2023-10-24 PROCEDURE — 77063 BREAST TOMOSYNTHESIS BI: CPT | Performed by: RADIOLOGY

## 2023-10-24 PROCEDURE — 77067 SCR MAMMO BI INCL CAD: CPT | Performed by: RADIOLOGY

## 2023-10-24 RX ORDER — TAMOXIFEN CITRATE 10 MG/1
TABLET ORAL
Qty: 90 TABLET | Refills: 3 | Status: SHIPPED | OUTPATIENT
Start: 2023-10-24 | End: 2024-02-07

## 2023-10-24 ASSESSMENT — PAIN SCALES - GENERAL: PAINLEVEL: NO PAIN (0)

## 2023-10-24 NOTE — PATIENT INSTRUCTIONS
Individualized Surveillance Plan for women  With 20% or greater lifetime risk of breast cancer   Per NCCN Breast Cancer Screening and Diagnosis Guidelines Version 1.2023   Recommended screening Test or procedure Last done Next Scheduled    Clinical encounter Clinical exam every 6-12 months.   Refer to genetic counseling if not already done.  Consider risk reduction strategies.   10/24/2023     October 2024   However, some family histories with breast cancers at a very young age, may warrant screening starting earlier.    *May begin at age 40 if breast cancers in the family occur at later ages.    Annual mammogram beginning 10 years younger than the earliest breast cancer in the family but not prior to age 30.    Recommend annual breast MRI to begin 10 years younger than the earliest breast cancer in the family but not prior to age 25.    Breast MRIs are preferably done on day 7-15 of the menstrual cycle in premenopausal women. 10/21/2022-Screening tomosynthesis mammogram, BI-RADS 0 for Possible developing focal asymmetries 6:30 anteriorly on the left. : BI-RADS CATEGORY: 0  10/27/2022: Left diagnostic mammogram and left ultrasound, both BI-RADS 1    4/13/2023: Breast MRI, BI-RADS 1 Mammogram after visit today    Breast MRI in April (4/14  or later)    Return to clinic in 1 year with mammogram after visit   Breast screening for patients at high risk due to thoracic radiation between the ages of 10-30   Annual clinical exam beginning 8 years after radiation therapy.    Annual screening mammogram beginning at age 30 or 8 years after radiation therapy    Annual breast MRI, beginning at age 25 or 8 years after radiation therapy.       NA     NA   Women who have a lifetime risk of >20% based on history of LCIS or ADH/ALH Annual screening mammogram beginning at age of LCIS or ADH/ALH but not prior to age 30.    Consider annual MRI to begin at age of diagnosis of LCIS or ADH/ALH but not prior to age 25.    Consider risk  reducing strategies.     NA     NA    Recommend risk reducing strategies for women with 1.7% 5 year risk of breast cancer. Started low dose tamoxifen in November 2019 Now on 5 mg daily dose through the end of November 2024

## 2023-10-24 NOTE — PROGRESS NOTES
Oncology Risk Management Consultation:  Date on this visit: 10/24/2023    Hafsa Corona  requires heightened screening and surveillance for her higher risk of breast cancer, secondary to a family history of breast cancer in her mother at age 63.    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 She is considered to at high risk for breast cancer and has a 30.3% lifetime risk for breast cancer by the SHERON model. She has a 10.8% 5 year risk by the SHERON model.      Primary Physician:  MARLON Reilly-CNP     History Of Present Illness:  Ms. Corona is a very pleasant  68 year old female who presents with a family history of breast cancer.      Genetic testin2017 - Negative for mutations in the AIP, ALK, APC, COSTA, BAP1, BARD1, BLM, BRCA1, BRCA2, BRIP1, BMPR1A, CDH1, CDK4, CDKN1B, CDKN2A, CHEK2, DICER1, EPCAM, FANCC, FH, FLCN, GALNT12, GREM1, HOXB13, MAX, MEN1, MET, MITF, MLH1, MRE11A, MSH2, MSH6, MUTYH, NBN, NF1, NF2, PALB2, PHOX2B, PMS2, POLD1, POLE, POT1, XPPQO9X, PTCH1, PTEN, RAD50, RAD51C, RAD51D, RB1, RET, SDHA, SDHAF2, SDHB, SDHC, SDHD, SMAD4, SMARCA4, SMARCB1, SMARCE1, STK11, SUFU, PBMZ549, TP53, TSC1, TSC2, VHL, and XRCC2 genes using a Cancer-Next Expanded panel through [employer].     Pertinent history:  2007- R salpingo oophorectomy for abnormal imaging (enlarged ovary), biopsy showed endometriosis  2017 - Stage 1C2 Clear cell carcinoma of the L ovary, s/p HELENE/LSO and chemotherapy.19 cm cystic mass  18: R nephrectomy with Dr. 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   Nulliparous.  Menarche at 10.  Menopause: age 54  Hx of OCPs x6 years.  No hx of HRT.  Density: heterogeneously dense  No history of breast biopsy.  Hx of breast cyst in , aspirated   No history of hyperplasia, atypia, or malignancy  On 5 mg daily low dose tamoxifen since 10/10/2019 for prevention of breast cancer. Continue through November  2024     Pertinent screening history:  6/2011 - Screening mammogram, normal by report   2/1/2014 - Screening mammogram, normal by report  9/15/2017 - Screening mammogram, BiRads1.    4/5/2018 - Breast MRI, BiRads2.  9/21/2018: Screening tomosynthesis mammogram, BI-RADS 1.  4/9/2019: Breast MRI, BI-RADS 1.  9/27/2019- Screening tomosynthesis mammogram, BiRads1  10/1/2020- Screening mammogram, BiRads1  3/29/2021- Breast MRI, BiRads1  10/13/2021- Screening tomosynthesis mammogram, BiRads1  4/6/2022- Breast MRI, BiRads1  10/21/2022-screening tomosynthesis mammogram, BI-RADS 0 for Possible developing focal asymmetries 6:30 anteriorly on the left. : BI-RADS CATEGORY: 0  10/27/2022: Left diagnostic mammogram and left ultrasound, both BI-RADS 1  4/13/2023: Breast MRI, BI-RADS 1     At this visit, she denies asymmetry, lumps, masses, thickening, pain, nipple discharge and skin changes     Past Medical/Surgical History:  Past Medical History:   Diagnosis Date    Anxiety state, unspecified     [id number]    Arthritis 2014    vague, gradual, not treated really, knees feel it    At high risk for breast cancer 09/27/2019    30.3% lifetime risk by SHERON model    Attention deficit disorder without mention of hyperactivity     Cancer (H) 7/2017 and 1/2018    ovarian and kidney cancers, both treated well    Chronic rhinitis     Depressive disorder lifetime    plus Anxiety plus ADD. Escitalipram, therapy, ADD meds maybe    Heart disease 1999    tachycardia and high cholesterol, managed    History of blood transfusion 07/2017    while in hospital for ovarian cancer    Hypertension 1999    tho BP was too LOW last week. past 12 years Generally OK    Panic disorder without agoraphobia     Pure hypercholesterolemia     Lipitor    Tachycardia, unspecified     9/1999-2/2004    Type II or unspecified type diabetes mellitus without mention of complication, not stated as uncontrolled     diagnosed 2004     Past Surgical History:   Procedure  Laterality Date    ABSCESS DRAINAGE Left     left leg     AS REMV KIDNEY,RADICAL Right     BIOPSY  7/2017 and 2/2018    ovarian and kidney cancer biopsies. also 1987 breast benign    BREAST CYST EXCISION  1985    BREAST SURGERY  1987    date unsure. benign breast cyst removed    CATARACT IOL, RT/LT Left 05/18/2018    CATARACT IOL, RT/LT Right 07/1318    COLONOSCOPY  2008 and 2013    polyps removed. Next due fall 2018    ENDOMETRIAL ABLATION  2008    EXCISE MASS BACK N/A 12/6/2021    Procedure: EXCISION, MASS, BACK;  Surgeon: Norris Vela MD;  Location: [facility] OR    HYSTERECTOMY TOTAL ABDOMINAL, BILATERAL SALPINGO-OOPHORECTOMY, NODE DISSECTION, COMBINED Left 7/7/2017    Procedure: COMBINED HYSTERECTOMY TOTAL ABDOMINAL, SALPINGO-OOPHORECTOMY, NODE DISSECTION;  Exploratory Laparotomy, Left Salpingo-Oophorectomy, Cancer Staging, Total Hysterectomy, Omentectomy, Evacuation of abdominal fluid, Lymph Node Dissection  Anesthesia Block ;  Surgeon: Serena Cole MD;  [facility]: [facility]    HYSTERECTOMY, PAP NO LONGER INDICATED  07/07/2017    Laparotomy; for ovarian cancer staging    HYSTERECTOMY, PAP NO LONGER INDICATED      INSERT PORT VASCULAR ACCESS Right 8/21/2017    Procedure: INSERT PORT VASCULAR ACCESS;  Single Lumen Chest Power Port;  Surgeon: Stephen Mike PA-C;  [facility]    IR PORT REMOVAL RIGHT  11/12/2021    LYMPHADENECTOMY RETROPERITONEAL Bilateral 07/07/2017    Laparotomy; pelvics & para-aortics; for ovarian cancer staging    OMENTECTOMY  07/07/2017    Laparotomy; for ovarian cancer staging    ORTHOPEDIC SURGERY  1/2002    broken left jaw due to fall, 4 fractures, titanium plates    OTHER SURGICAL HISTORY  01/2002    OTHER SURGICAL HISTORYfacial surgery due to fall     PHACOEMULSIFICATION CLEAR CORNEA WITH STANDARD INTRAOCULAR LENS IMPLANT Right 7/13/2018    Procedure: PHACOEMULSIFICATION CLEAR CORNEA WITH STANDARD INTRAOCULAR LENS IMPLANT;  RIght Eye Phacoemulsification Clear Cornea  with Standard Intraocular Lens Placement ;  Surgeon: Mary Jo Massey MD;  [facility]    PHACOEMULSIFICATION CLEAR CORNEA WITH TORIC INTRAOCULAR LENS IMPLANT Left 5/18/2018    Procedure: PHACOEMULSIFICATION CLEAR CORNEA WITH TORIC INTRAOCULAR LENS IMPLANT;  Left Eye Phacoemulsification with Toric Lens;  Surgeon: Mary Jo Massey MD;  [facility]    REMOVE PORT VASCULAR ACCESS Right 11/12/2021    Procedure: REMOVAL, VASCULAR ACCESS PORT right;  Surgeon: Afsaneh Das PA-C;  Location: [facility] OR    SALPINGO OOPHORECTOMY,R/L/KAYY Right 2008    Salpingo Oophorectomy, RT    SALPINGO OOPHORECTOMY,R/L/KAYY Left 07/07/2017    Laparotomy; for ovarian cancer staging    SALPINGOOPHORECTOMY  2008    SURGICAL HISTORY OF -       facial surgery d/t fall in 1/2002    SURGICAL HISTORY OF -       1985 removal of breast cyst    SURGICAL HISTORY OF -   2008    endometrial ablation    YAG CAPSULOTOMY OD (RIGHT EYE)  10/22/2018       Allergies:  Allergies as of 10/24/2023 - Reviewed 10/24/2023   Allergen Reaction Noted    Bee venom Anaphylaxis and Swelling 05/10/2018    Paclitaxel Anaphylaxis and Difficulty breathing 08/30/2017    Wasps [hornets] Anaphylaxis 09/03/2009    Yellow hornet venom [hornet venom] Anaphylaxis and Swelling 05/10/2018    Yellow jacket venom [honey bee venom] Anaphylaxis and Swelling 05/10/2018    Sulfa antibiotics Hives and Rash 07/19/2005       Current Medications:  Current Outpatient Medications   Medication Sig Dispense Refill    Acetaminophen (TYLENOL PO) Take 500 mg by mouth 2 tabs prn pain (monthly)      amphetamine-dextroamphetamine (ADDERALL XR) 20 MG 24 hr capsule Take 20 mg by mouth daily      atorvastatin (LIPITOR) 80 MG tablet TAKE 1 TABLET(80 MG) BY MOUTH DAILY 90 tablet 3    B Complex Vitamins (VITAMIN B-COMPLEX PO) Take 50 mg by mouth       CALCIUM-MAGNESIUM-VITAMIN D PO Take 2 tablets by mouth daily      Cholecalciferol (VITAMIN D) 1000 UNIT capsule Take 2,000 Units by mouth daily        EPINEPHrine (ANY BX GENERIC EQUIV) 0.3 MG/0.3ML injection 2-pack Inject 0.3 mLs (0.3 mg) into the muscle once as needed for anaphylaxis 0.3 mL PRN    escitalopram (LEXAPRO) 20 MG tablet Take 1 tablet (20 mg) by mouth daily 90 tablet 0    fluticasone (FLONASE) 50 MCG/ACT nasal spray SHAKE LIQUID AND USE 2 SPRAYS IN EACH NOSTRIL DAILY 48 g 2    Magnesium Hydroxide (MAGNESIA PO) Take 500 mg by mouth      metFORMIN (GLUCOPHAGE XR) 500 MG 24 hr tablet TAKE 2 TABLETS BY MOUTH EVERY DAY WITH THE EVENING MEAL 180 tablet 3    Multiple Vitamins-Minerals (MULTIVITAMIN ADULT PO) Take 1 tablet by mouth daily      nitroFURantoin macrocrystal-monohydrate (MACROBID) 100 MG capsule TAKE 1 CAPSULE BY MOUTH AFTER INTERCOURSE AS NEEDED 30 capsule PRN    phenazopyridine (PYRIDIUM) 200 MG tablet Take 1 tablet (200 mg) by mouth 3 times daily as needed for irritation 9 tablet 0    Probiotic Product (PRO-BIOTIC BLEND PO) Take 1 capsule by mouth daily Enzymatic Therapy-probiotic pearls      rivaroxaban ANTICOAGULANT (XARELTO) 20 MG TABS tablet Take 1 tablet (20 mg) by mouth daily (with dinner) 90 tablet 3    tamoxifen (NOLVADEX) 10 MG tablet Take one half  pill daily for a 5 mg dose. 90 tablet 3        Family History:  Family History   Problem Relation Age of Onset    C.A.D. Mother     Hypertension Mother     Breast Cancer Mother     Psychotic Disorder Mother     Colon Cancer Mother     Cancer Mother         Bone cancer    Coronary Artery Disease Mother     Hyperlipidemia Mother         treated w. statins    Other Cancer Mother         bone/marrow, ,  of this    Depression Mother     Anxiety Disorder Mother     Mental Illness Mother         various undiagnosed types, but THERE    Obesity Mother     Bone Cancer Mother     Other - See Comments Mother         psychotic disease     C.A.D. Father     Diabetes Father     Hypertension Father     Cerebrovascular Disease Father         1984 or     Psychotic Disorder Father      Pancreatic Cancer Father     Coronary Artery Disease Father     Hyperlipidemia Father         treated w statins    Other Cancer Father         pancreatic, ,  of this    Depression Father     Mental Illness Father         likely PTSD and depression    Obesity Father     Other - See Comments Father         cerebrovascular disease, psychotic disease     Psychotic Disorder Sister         x2    Neurologic Disorder Sister     Hypertension Sister     Hyperlipidemia Sister         treated w statins    Depression Sister     Anxiety Disorder Sister     Obesity Sister     Rashes/Skin Problems Sister         precancerous lesion on leg    Psychotic Disorder Sister     Other - See Comments Sister         small kidney stone     Hypertension Sister     Hyperlipidemia Sister         treated w statins    Depression Sister     Anxiety Disorder Sister     Other - See Comments Sister         psychotic disease     Prostate Problems Brother         cleared     Hypertension Brother     Hyperlipidemia Brother         unsure if treated w statins    Depression Brother     Anxiety Disorder Brother     Mental Illness Brother         undiagnosed but THERE    Obesity Brother     Pacemaker Brother 70        bradycardia, sick sinus syndrome    Psychotic Disorder Brother         x2    Depression Brother         major depressive disorder and OCD    Anxiety Disorder Brother     Mental Illness Brother         not sure of diagnoses, treated    Hypertension Brother     Hyperlipidemia Brother     Prostate Cancer Brother     Depression Brother     Anxiety Disorder Brother     Other - See Comments Brother         psychotic disease     Psychotic Disorder Maternal Grandmother         ?    Coronary Artery Disease Maternal Grandmother          of heart attack age 84?    Depression Maternal Grandmother     Psychotic Disorder Maternal Grandfather         ?    Psychotic Disorder Paternal Grandmother         Schizophernia    Coronary Artery Disease  Paternal Grandmother          of heart attack, age 85?    Depression Paternal Grandmother         severe, but recovered    Mental Illness Paternal Grandmother         possible bipolar or other    Schizophrenia Paternal Grandmother     Psychotic Disorder Paternal Grandfather         ?    Respiratory Paternal Grandfather          of emphysema and smoking    Emphysema Paternal Grandfather     Cancer - colorectal No family hx of     Glaucoma No family hx of     Macular Degeneration No family hx of        Social History:  Social History     Socioeconomic History    Marital status: Single     Spouse name: Not on file    Number of children: 0    Years of education: Not on file    Highest education level: Not on file   Occupational History    Occupation: Employment Counselor     Comment: changing positions due to layoff   Tobacco Use    Smoking status: Never     Passive exposure: Never    Smokeless tobacco: Never   Vaping Use    Vaping Use: Never used   Substance and Sexual Activity    Alcohol use: Yes     Comment: Very infrequent, a bit of wine or beer 2x per month maybe    Drug use: Yes     Types: Marijuana     Comment: infrequent, for celebrations only, maybe 8x per year    Sexual activity: Yes     Partners: Male     Birth control/protection: None     Comment: long-time    Other Topics Concern    Parent/sibling w/ CABG, MI or angioplasty before 65F 55M? No   Social History Narrative    Not on file     Social Determinants of Health     Financial Resource Strain: Not on file   Food Insecurity: Not on file   Transportation Needs: Not on file   Physical Activity: Not on file   Stress: Not on file   Social Connections: Not on file   Interpersonal Safety: Not At Risk (10/8/2021)    Humiliation, Afraid, Rape, and Kick questionnaire     Fear of Current or Ex-Partner: No     Emotionally Abused: No     Physically Abused: No     Sexually Abused: No   Housing Stability: Not on file       Physical Exam:  /74  (BP Location: Right arm, Patient Position: Sitting, Cuff Size: Adult Regular)   Pulse 90   Temp 98.7  F (37.1  C) (Oral)   Resp 16   Wt 92.4 kg (203 lb 9.6 oz)   LMP 01/01/2009   SpO2 96%   BMI 37.23 kg/m      GENERAL APPEARANCE: healthy, alert and no distress     NECK: no adenopathy, no asymmetry or masses     RESP: lungs clear to auscultation - no rales, rhonchi or wheezes    BREAST: A multipositional, bilateral breast exam was performed.  Fairly symmetrical -Rsl>L. Right breast: no palpable dominant masses, no nipple discharge, no skin changes. Soft tissue.  Right axilla: no palpable adenopathy. Left breast: no palpable dominant masses, no nipple discharge, no skin changes. Left axilla: no palpable adenopathy. Soft tissue.    LYMPHATICS: No cervical, supraclavicular, axillary or inguinal lymphadenopathy     CARDIOVASCULAR: regular rate and rhythm, normal S1 S2, no S3 or S4 and no murmur.       SKIN: no suspicious lesions or rashes    Laboratory/Imaging Studies  No results found for any visits on 10/24/23.    ASSESSMENT  We discussed her last screening tests and reviewed results.  Hafsa is doing well and continues on the 5 mg of tamoxifen daily for prevention.  She will be continuing this through the end of November, 2024.  Previously she had been taking a 10 mg dose, but she cut back to the 5 mg dose because she felt that it was making her tired.  This could be the case and her fatigue could also be attributed to the fact that she was laid off from work at the end of last year, has been looking for part-time or full-time work and has been doing a full life assessment.    We discussed that it is fine for her to stay on the 5 mg dose as it also has been proven to reduce the risk for breast cancer up to  50%.    There are no changes to her family's history and her exam today is unremarkable.  She will proceed to her mammogram and with the following plan.  Individualized Surveillance Plan for women  With 20%  or greater lifetime risk of breast cancer   Per NCCN Breast Cancer Screening and Diagnosis Guidelines Version 1.2023   Recommended screening Test or procedure Last done Next Scheduled    Clinical encounter Clinical exam every 6-12 months.   Refer to genetic counseling if not already done.  Consider risk reduction strategies.   10/24/2023     October 2024   However, some family histories with breast cancers at a very young age, may warrant screening starting earlier.    *May begin at age 40 if breast cancers in the family occur at later ages.    Annual mammogram beginning 10 years younger than the earliest breast cancer in the family but not prior to age 30.    Recommend annual breast MRI to begin 10 years younger than the earliest breast cancer in the family but not prior to age 25.    Breast MRIs are preferably done on day 7-15 of the menstrual cycle in premenopausal women. 10/21/2022-Screening tomosynthesis mammogram, BI-RADS 0 for Possible developing focal asymmetries 6:30 anteriorly on the left. : BI-RADS CATEGORY: 0  10/27/2022: Left diagnostic mammogram and left ultrasound, both BI-RADS 1    4/13/2023: Breast MRI, BI-RADS 1 Mammogram after visit today    Breast MRI in April (4/14  or later)    Return to clinic in 1 year with mammogram after visit   Breast screening for patients at high risk due to thoracic radiation between the ages of 10-30   Annual clinical exam beginning 8 years after radiation therapy.    Annual screening mammogram beginning at age 30 or 8 years after radiation therapy    Annual breast MRI, beginning at age 25 or 8 years after radiation therapy.       NA     NA   Women who have a lifetime risk of >20% based on history of LCIS or ADH/ALH Annual screening mammogram beginning at age of LCIS or ADH/ALH but not prior to age 30.    Consider annual MRI to begin at age of diagnosis of LCIS or ADH/ALH but not prior to age 25.    Consider risk reducing strategies.     NA     NA    Recommend risk  reducing strategies for women with 1.7% 5 year risk of breast cancer. Started November 2019 Continue through the end of November 2024     I spent a total of 20 minutes on the day of the visit. Please see the note for further information on patient assessment and treatment.    MARLON Chiu-CNS, OCN, AGN-BC  Clinical Nurse Specialist  Cancer Risk Management Program  [facility]    CC: Morelia Ayon, MARLON-CNP

## 2023-10-24 NOTE — NURSING NOTE
"Oncology Rooming Note    October 24, 2023 2:06 PM   Hafsa Corona is a 68 year old female who presents for:    Chief Complaint   Patient presents with    Oncology Clinic Visit     Breast Cancer Screening, High Risk     Initial Vitals: /74 (BP Location: Right arm, Patient Position: Sitting, Cuff Size: Adult Regular)   Pulse 90   Temp 98.7  F (37.1  C) (Oral)   Resp 16   Wt 92.4 kg (203 lb 9.6 oz)   LMP 01/01/2009   SpO2 96%   BMI 37.23 kg/m   Estimated body mass index is 37.23 kg/m  as calculated from the following:    Height as of 8/1/23: 1.575 m (5' 2.01\").    Weight as of this encounter: 92.4 kg (203 lb 9.6 oz). Body surface area is 2.01 meters squared.  No Pain (0) Comment: Data Unavailable   Patient's last menstrual period was 01/01/2009.  Allergies reviewed: Yes  Medications reviewed: Yes    Medications: Medication refills not needed today.  Pharmacy name entered into EPIC:    [id number] - [place], [place] - [address] AT [facility] [address] [facility] [address]  [id number] [place] - [facility] [facility] [address]  [facility] [place], [place] - [address] [facility] [phone]  [facility] [address]  [facility] #[id number] - [place], [place] - [address] AT [facility] [address]  [facility] [address] [facility] [address]    Clinical concerns: Patient wants to talk about her Tamoxifen medication.    Stephie  was NOT notified.      Jan William"

## 2023-10-24 NOTE — LETTER
10/24/2023         RE: Hafsa Corona  [address]  [place] [address]        Dear Colleague,    Thank you for referring your patient, Hafsa Corona, to the [facility]. Please see a copy of my visit note below.    Oncology Risk Management Consultation:  Date on this visit: 10/24/2023    Hafsa Corona  requires heightened screening and surveillance for her higher risk of breast cancer, secondary to a family history of breast cancer in her mother at age 63.    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 She is considered to at high risk for breast cancer and has a 30.3% lifetime risk for breast cancer by the SHERON model. She has a 10.8% 5 year risk by the SHERON model.      Primary Physician:  MARLON Reilly-CNP     History Of Present Illness:  Ms. Corona is a very pleasant  68 year old female who presents with a family history of breast cancer.      Genetic testin2017 - Negative for mutations in the AIP, ALK, APC, COSTA, BAP1, BARD1, BLM, BRCA1, BRCA2, BRIP1, BMPR1A, CDH1, CDK4, CDKN1B, CDKN2A, CHEK2, DICER1, EPCAM, FANCC, FH, FLCN, GALNT12, GREM1, HOXB13, MAX, MEN1, MET, MITF, MLH1, MRE11A, MSH2, MSH6, MUTYH, NBN, NF1, NF2, PALB2, PHOX2B, PMS2, POLD1, POLE, POT1, SHWLX8Z, PTCH1, PTEN, RAD50, RAD51C, RAD51D, RB1, RET, SDHA, SDHAF2, SDHB, SDHC, SDHD, SMAD4, SMARCA4, SMARCB1, SMARCE1, STK11, SUFU, DDCS101, TP53, TSC1, TSC2, VHL, and XRCC2 genes using a Cancer-Next Expanded panel through [place].     Pertinent history:  - R salpingo oophorectomy for abnormal imaging (enlarged ovary), biopsy showed endometriosis  2017 - Stage 1C2 Clear cell carcinoma of the L ovary, s/p HELENE/LSO and chemotherapy.19 cm cystic mass  18: R nephrectomy with Dr. 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   Nulliparous.  Menarche at 10.  Menopause: age 54  Hx of OCPs x6 years.  No hx of  HRT.  Density: heterogeneously dense  No history of breast biopsy.  Hx of breast cyst in 1987, aspirated   No history of hyperplasia, atypia, or malignancy  On 5 mg daily low dose tamoxifen since 10/10/2019 for prevention of breast cancer. Continue through November 2024     Pertinent screening history:  6/2011 - Screening mammogram, normal by report   2/1/2014 - Screening mammogram, normal by report  9/15/2017 - Screening mammogram, BiRads1.    4/5/2018 - Breast MRI, BiRads2.  9/21/2018: Screening tomosynthesis mammogram, BI-RADS 1.  4/9/2019: Breast MRI, BI-RADS 1.  9/27/2019- Screening tomosynthesis mammogram, BiRads1  10/1/2020- Screening mammogram, BiRads1  3/29/2021- Breast MRI, BiRads1  10/13/2021- Screening tomosynthesis mammogram, BiRads1  4/6/2022- Breast MRI, BiRads1  10/21/2022-screening tomosynthesis mammogram, BI-RADS 0 for Possible developing focal asymmetries 6:30 anteriorly on the left. : BI-RADS CATEGORY: 0  10/27/2022: Left diagnostic mammogram and left ultrasound, both BI-RADS 1  4/13/2023: Breast MRI, BI-RADS 1     At this visit, she denies asymmetry, lumps, masses, thickening, pain, nipple discharge and skin changes     Past Medical/Surgical History:  Past Medical History:   Diagnosis Date    Anxiety state, unspecified     [id number]    Arthritis 2014    vague, gradual, not treated really, knees feel it    At high risk for breast cancer 09/27/2019    30.3% lifetime risk by SHERON model    Attention deficit disorder without mention of hyperactivity     Cancer (H) 7/2017 and 1/2018    ovarian and kidney cancers, both treated well    Chronic rhinitis     Depressive disorder lifetime    plus Anxiety plus ADD. Escitalipram, therapy, ADD meds maybe    Heart disease 1999    tachycardia and high cholesterol, managed    History of blood transfusion 07/2017    while in hospital for ovarian cancer    Hypertension 1999    tho BP was too LOW last week. past 12 years Generally OK    Panic disorder without  agoraphobia     Pure hypercholesterolemia     Lipitor    Tachycardia, unspecified     9/1999-2/2004    Type II or unspecified type diabetes mellitus without mention of complication, not stated as uncontrolled     diagnosed 2004     Past Surgical History:   Procedure Laterality Date    ABSCESS DRAINAGE Left     left leg     AS REMV KIDNEY,RADICAL Right     BIOPSY  7/2017 and 2/2018    ovarian and kidney cancer biopsies. also 1987 breast benign    BREAST CYST EXCISION  1985    BREAST SURGERY  1987    date unsure. benign breast cyst removed    CATARACT IOL, RT/LT Left 05/18/2018    CATARACT IOL, RT/LT Right 07/1318    COLONOSCOPY  2008 and 2013    polyps removed. Next due fall 2018    ENDOMETRIAL ABLATION  2008    EXCISE MASS BACK N/A 12/6/2021    Procedure: EXCISION, MASS, BACK;  Surgeon: Norris Vela MD;  Location: [facility] OR    HYSTERECTOMY TOTAL ABDOMINAL, BILATERAL SALPINGO-OOPHORECTOMY, NODE DISSECTION, COMBINED Left 7/7/2017    Procedure: COMBINED HYSTERECTOMY TOTAL ABDOMINAL, SALPINGO-OOPHORECTOMY, NODE DISSECTION;  Exploratory Laparotomy, Left Salpingo-Oophorectomy, Cancer Staging, Total Hysterectomy, Omentectomy, Evacuation of abdominal fluid, Lymph Node Dissection  Anesthesia Block ;  Surgeon: Serena Cole MD;  Location: [facility]    HYSTERECTOMY, PAP NO LONGER INDICATED  07/07/2017    Laparotomy; for ovarian cancer staging    HYSTERECTOMY, PAP NO LONGER INDICATED      INSERT PORT VASCULAR ACCESS Right 8/21/2017    Procedure: INSERT PORT VASCULAR ACCESS;  Single Lumen Chest Power Port;  Surgeon: Stephen Mike PA-C;  [facility]    IR PORT REMOVAL RIGHT  11/12/2021    LYMPHADENECTOMY RETROPERITONEAL Bilateral 07/07/2017    Laparotomy; pelvics & para-aortics; for ovarian cancer staging    OMENTECTOMY  07/07/2017    Laparotomy; for ovarian cancer staging    ORTHOPEDIC SURGERY  1/2002    broken left jaw due to fall, 4 fractures, titanium plates    OTHER SURGICAL HISTORY  01/2002    OTHER  SURGICAL HISTORYfacial surgery due to fall     PHACOEMULSIFICATION CLEAR CORNEA WITH STANDARD INTRAOCULAR LENS IMPLANT Right 7/13/2018    Procedure: PHACOEMULSIFICATION CLEAR CORNEA WITH STANDARD INTRAOCULAR LENS IMPLANT;  RIght Eye Phacoemulsification Clear Cornea with Standard Intraocular Lens Placement ;  Surgeon: Mary Jo Massey MD;  [facility]    PHACOEMULSIFICATION CLEAR CORNEA WITH TORIC INTRAOCULAR LENS IMPLANT Left 5/18/2018    Procedure: PHACOEMULSIFICATION CLEAR CORNEA WITH TORIC INTRAOCULAR LENS IMPLANT;  Left Eye Phacoemulsification with Toric Lens;  Surgeon: Mary Jo Massey MD;  [facility]    REMOVE PORT VASCULAR ACCESS Right 11/12/2021    Procedure: REMOVAL, VASCULAR ACCESS PORT right;  Surgeon: Afsaneh Das PA-C;  Location: [facility] OR    SALPINGO OOPHORECTOMY,R/L/KAYY Right 2008    Salpingo Oophorectomy, RT    SALPINGO OOPHORECTOMY,R/L/KAYY Left 07/07/2017    Laparotomy; for ovarian cancer staging    SALPINGOOPHORECTOMY  2008    SURGICAL HISTORY OF -       facial surgery d/t fall in 1/2002    SURGICAL HISTORY OF -       1985 removal of breast cyst    SURGICAL HISTORY OF -   2008    endometrial ablation    YAG CAPSULOTOMY OD (RIGHT EYE)  10/22/2018       Allergies:  Allergies as of 10/24/2023 - Reviewed 10/24/2023   Allergen Reaction Noted    Bee venom Anaphylaxis and Swelling 05/10/2018    Paclitaxel Anaphylaxis and Difficulty breathing 08/30/2017    Wasps [hornets] Anaphylaxis 09/03/2009    Yellow hornet venom [hornet venom] Anaphylaxis and Swelling 05/10/2018    Yellow jacket venom [honey bee venom] Anaphylaxis and Swelling 05/10/2018    Sulfa antibiotics Hives and Rash 07/19/2005       Current Medications:  Current Outpatient Medications   Medication Sig Dispense Refill    Acetaminophen (TYLENOL PO) Take 500 mg by mouth 2 tabs prn pain (monthly)      amphetamine-dextroamphetamine (ADDERALL XR) 20 MG 24 hr capsule Take 20 mg by mouth daily      atorvastatin (LIPITOR) 80 MG tablet TAKE 1  TABLET(80 MG) BY MOUTH DAILY 90 tablet 3    B Complex Vitamins (VITAMIN B-COMPLEX PO) Take 50 mg by mouth       CALCIUM-MAGNESIUM-VITAMIN D PO Take 2 tablets by mouth daily      Cholecalciferol (VITAMIN D) 1000 UNIT capsule Take 2,000 Units by mouth daily       EPINEPHrine (ANY BX GENERIC EQUIV) 0.3 MG/0.3ML injection 2-pack Inject 0.3 mLs (0.3 mg) into the muscle once as needed for anaphylaxis 0.3 mL PRN    escitalopram (LEXAPRO) 20 MG tablet Take 1 tablet (20 mg) by mouth daily 90 tablet 0    fluticasone (FLONASE) 50 MCG/ACT nasal spray SHAKE LIQUID AND USE 2 SPRAYS IN EACH NOSTRIL DAILY 48 g 2    Magnesium Hydroxide (MAGNESIA PO) Take 500 mg by mouth      metFORMIN (GLUCOPHAGE XR) 500 MG 24 hr tablet TAKE 2 TABLETS BY MOUTH EVERY DAY WITH THE EVENING MEAL 180 tablet 3    Multiple Vitamins-Minerals (MULTIVITAMIN ADULT PO) Take 1 tablet by mouth daily      nitroFURantoin macrocrystal-monohydrate (MACROBID) 100 MG capsule TAKE 1 CAPSULE BY MOUTH AFTER INTERCOURSE AS NEEDED 30 capsule PRN    phenazopyridine (PYRIDIUM) 200 MG tablet Take 1 tablet (200 mg) by mouth 3 times daily as needed for irritation 9 tablet 0    Probiotic Product (PRO-BIOTIC BLEND PO) Take 1 capsule by mouth daily Enzymatic Therapy-probiotic pearls      rivaroxaban ANTICOAGULANT (XARELTO) 20 MG TABS tablet Take 1 tablet (20 mg) by mouth daily (with dinner) 90 tablet 3    tamoxifen (NOLVADEX) 10 MG tablet Take one half  pill daily for a 5 mg dose. 90 tablet 3        Family History:  Family History   Problem Relation Age of Onset    C.A.D. Mother     Hypertension Mother     Breast Cancer Mother     Psychotic Disorder Mother     Colon Cancer Mother     Cancer Mother         Bone cancer    Coronary Artery Disease Mother     Hyperlipidemia Mother         treated w. statins    Other Cancer Mother         bone/marrow, ,  of this    Depression Mother     Anxiety Disorder Mother     Mental Illness Mother         various undiagnosed types, but THERE     Obesity Mother     Bone Cancer Mother     Other - See Comments Mother         psychotic disease     C.A.D. Father     Diabetes Father     Hypertension Father     Cerebrovascular Disease Father         1984 or     Psychotic Disorder Father     Pancreatic Cancer Father     Coronary Artery Disease Father     Hyperlipidemia Father         treated w statins    Other Cancer Father         pancreatic, ,  of this    Depression Father     Mental Illness Father         likely PTSD and depression    Obesity Father     Other - See Comments Father         cerebrovascular disease, psychotic disease     Psychotic Disorder Sister         x2    Neurologic Disorder Sister     Hypertension Sister     Hyperlipidemia Sister         treated w statins    Depression Sister     Anxiety Disorder Sister     Obesity Sister     Rashes/Skin Problems Sister         precancerous lesion on leg    Psychotic Disorder Sister     Other - See Comments Sister         small kidney stone     Hypertension Sister     Hyperlipidemia Sister         treated w statins    Depression Sister     Anxiety Disorder Sister     Other - See Comments Sister         psychotic disease     Prostate Problems Brother         cleared     Hypertension Brother     Hyperlipidemia Brother         unsure if treated w statins    Depression Brother     Anxiety Disorder Brother     Mental Illness Brother         undiagnosed but THERE    Obesity Brother     Pacemaker Brother 70        bradycardia, sick sinus syndrome    Psychotic Disorder Brother         x2    Depression Brother         major depressive disorder and OCD    Anxiety Disorder Brother     Mental Illness Brother         not sure of diagnoses, treated    Hypertension Brother     Hyperlipidemia Brother     Prostate Cancer Brother     Depression Brother     Anxiety Disorder Brother     Other - See Comments Brother         psychotic disease     Psychotic Disorder Maternal Grandmother         ?    Coronary  Artery Disease Maternal Grandmother          of heart attack age 84?    Depression Maternal Grandmother     Psychotic Disorder Maternal Grandfather         ?    Psychotic Disorder Paternal Grandmother         Schizophernia    Coronary Artery Disease Paternal Grandmother          of heart attack, age 85?    Depression Paternal Grandmother         severe, but recovered    Mental Illness Paternal Grandmother         possible bipolar or other    Schizophrenia Paternal Grandmother     Psychotic Disorder Paternal Grandfather         ?    Respiratory Paternal Grandfather          of emphysema and smoking    Emphysema Paternal Grandfather     Cancer - colorectal No family hx of     Glaucoma No family hx of     Macular Degeneration No family hx of        Social History:  Social History     Socioeconomic History    Marital status: Single     Spouse name: Not on file    Number of children: 0    Years of education: Not on file    Highest education level: Not on file   Occupational History    Occupation: Employment Counselor     Comment: changing positions due to layoff   Tobacco Use    Smoking status: Never     Passive exposure: Never    Smokeless tobacco: Never   Vaping Use    Vaping Use: Never used   Substance and Sexual Activity    Alcohol use: Yes     Comment: Very infrequent, a bit of wine or beer 2x per month maybe    Drug use: Yes     Types: Marijuana     Comment: infrequent, for celebrations only, maybe 8x per year    Sexual activity: Yes     Partners: Male     Birth control/protection: None     Comment: long-time    Other Topics Concern    Parent/sibling w/ CABG, MI or angioplasty before 65F 55M? No   Social History Narrative    Not on file     Social Determinants of Health     Financial Resource Strain: Not on file   Food Insecurity: Not on file   Transportation Needs: Not on file   Physical Activity: Not on file   Stress: Not on file   Social Connections: Not on file   Interpersonal Safety: Not  At Risk (10/8/2021)    Humiliation, Afraid, Rape, and Kick questionnaire     Fear of Current or Ex-Partner: No     Emotionally Abused: No     Physically Abused: No     Sexually Abused: No   Housing Stability: Not on file       Physical Exam:  /74 (BP Location: Right arm, Patient Position: Sitting, Cuff Size: Adult Regular)   Pulse 90   Temp 98.7  F (37.1  C) (Oral)   Resp 16   Wt 92.4 kg (203 lb 9.6 oz)   LMP 01/01/2009   SpO2 96%   BMI 37.23 kg/m      GENERAL APPEARANCE: healthy, alert and no distress     NECK: no adenopathy, no asymmetry or masses     RESP: lungs clear to auscultation - no rales, rhonchi or wheezes    BREAST: A multipositional, bilateral breast exam was performed.  Fairly symmetrical -Rsl>L. Right breast: no palpable dominant masses, no nipple discharge, no skin changes. Soft tissue.  Right axilla: no palpable adenopathy. Left breast: no palpable dominant masses, no nipple discharge, no skin changes. Left axilla: no palpable adenopathy. Soft tissue.    LYMPHATICS: No cervical, supraclavicular, axillary or inguinal lymphadenopathy     CARDIOVASCULAR: regular rate and rhythm, normal S1 S2, no S3 or S4 and no murmur.       SKIN: no suspicious lesions or rashes    Laboratory/Imaging Studies  No results found for any visits on 10/24/23.    ASSESSMENT  We discussed her last screening tests and reviewed results.  Hafsa is doing well and continues on the 5 mg of tamoxifen daily for prevention.  She will be continuing this through the end of November, 2024.  Previously she had been taking a 10 mg dose, but she cut back to the 5 mg dose because she felt that it was making her tired.  This could be the case and her fatigue could also be attributed to the fact that she was laid off from work at the end of last year, has been looking for part-time or full-time work and has been doing a full life assessment.    We discussed that it is fine for her to stay on the 5 mg dose as it also has been proven  to reduce the risk for breast cancer up to  50%.    There are no changes to her family's history and her exam today is unremarkable.  She will proceed to her mammogram and with the following plan.  Individualized Surveillance Plan for women  With 20% or greater lifetime risk of breast cancer   Per NCCN Breast Cancer Screening and Diagnosis Guidelines Version 1.2023   Recommended screening Test or procedure Last done Next Scheduled    Clinical encounter Clinical exam every 6-12 months.   Refer to genetic counseling if not already done.  Consider risk reduction strategies.   10/24/2023     October 2024   However, some family histories with breast cancers at a very young age, may warrant screening starting earlier.    *May begin at age 40 if breast cancers in the family occur at later ages.    Annual mammogram beginning 10 years younger than the earliest breast cancer in the family but not prior to age 30.    Recommend annual breast MRI to begin 10 years younger than the earliest breast cancer in the family but not prior to age 25.    Breast MRIs are preferably done on day 7-15 of the menstrual cycle in premenopausal women. 10/21/2022-Screening tomosynthesis mammogram, BI-RADS 0 for Possible developing focal asymmetries 6:30 anteriorly on the left. : BI-RADS CATEGORY: 0  10/27/2022: Left diagnostic mammogram and left ultrasound, both BI-RADS 1    4/13/2023: Breast MRI, BI-RADS 1 Mammogram after visit today    Breast MRI in April (4/14  or later)    Return to clinic in 1 year with mammogram after visit   Breast screening for patients at high risk due to thoracic radiation between the ages of 10-30   Annual clinical exam beginning 8 years after radiation therapy.    Annual screening mammogram beginning at age 30 or 8 years after radiation therapy    Annual breast MRI, beginning at age 25 or 8 years after radiation therapy.       NA     NA   Women who have a lifetime risk of >20% based on history of LCIS or ADH/ALH Annual  screening mammogram beginning at age of LCIS or ADH/ALH but not prior to age 30.    Consider annual MRI to begin at age of diagnosis of LCIS or ADH/ALH but not prior to age 25.    Consider risk reducing strategies.     NA     NA    Recommend risk reducing strategies for women with 1.7% 5 year risk of breast cancer. Started November 2019 Continue through the end of November 2024     I spent a total of 20 minutes on the day of the visit. Please see the note for further information on patient assessment and treatment.    MARLON Chiu-CNS, OCN, AGN-BC  Clinical Nurse Specialist  Cancer Risk Management Program  [facility] [place]    CC: Morelia Ayon, MARLON-CNP

## 2023-11-07 ENCOUNTER — OFFICE VISIT (OUTPATIENT)
Dept: UROLOGY | Facility: CLINIC | Age: 68
End: 2023-11-07
Payer: MEDICARE

## 2023-11-07 ENCOUNTER — TELEPHONE (OUTPATIENT)
Dept: FAMILY MEDICINE | Facility: CLINIC | Age: 68
End: 2023-11-07

## 2023-11-07 ENCOUNTER — ANCILLARY PROCEDURE (OUTPATIENT)
Dept: ULTRASOUND IMAGING | Facility: CLINIC | Age: 68
End: 2023-11-07
Attending: NURSE PRACTITIONER
Payer: MEDICARE

## 2023-11-07 VITALS
HEART RATE: 68 BPM | DIASTOLIC BLOOD PRESSURE: 73 MMHG | BODY MASS INDEX: 37.36 KG/M2 | SYSTOLIC BLOOD PRESSURE: 106 MMHG | HEIGHT: 62 IN | WEIGHT: 203 LBS

## 2023-11-07 DIAGNOSIS — N28.89 RENAL MASS: ICD-10-CM

## 2023-11-07 DIAGNOSIS — Z90.5 SOLITARY KIDNEY, ACQUIRED: ICD-10-CM

## 2023-11-07 DIAGNOSIS — R31.29 MICROSCOPIC HEMATURIA: Primary | ICD-10-CM

## 2023-11-07 PROCEDURE — 99213 OFFICE O/P EST LOW 20 MIN: CPT | Performed by: NURSE PRACTITIONER

## 2023-11-07 PROCEDURE — 76770 US EXAM ABDO BACK WALL COMP: CPT | Mod: GC | Performed by: RADIOLOGY

## 2023-11-07 ASSESSMENT — PAIN SCALES - GENERAL: PAINLEVEL: NO PAIN (0)

## 2023-11-07 NOTE — TELEPHONE ENCOUNTER
Medication Question or Refill    Contacts         Type Contact Phone/Fax    11/07/2023 02:25 PM CST Phone (Incoming) Hafsa Corona (Self) [phone] (M)            What medication are you calling about (include dose and sig)?: Support Stockings.        Controlled Substance Agreement on file:   CSA -- Patient Level:    CSA: None found at the patient level.       Who prescribed the medication?: Support stockings    Do you need a refill? Yes, Lost hard copy and would like a hard copy sent to her home address.  The patient states she would like to have an extra prescription in her chart when she may need another.        Patient offered an appointment? No    Do you have any questions or concerns?  No      Could we send this information to you in [employer] or would you prefer to receive a phone call?:   Patient would prefer a phone call   Okay to leave a detailed message?: Yes at Cell number on file:    Telephone Information:   Mobile [phone]

## 2023-11-07 NOTE — PATIENT INSTRUCTIONS
UROLOGY CLINIC VISIT PATIENT INSTRUCTIONS    -Will check a urinalysis and culture now. Can do at [facility].   -Schedule a follow up visit with Dr. Fregoso in March to discuss the stress incontinence interventions we discussed today.   -Follow up with me in one year with an ultrasound.     If you have any issues, questions or concerns in the meantime, do not hesitate to contact us at [phone] or via [employer].     Chandrika Byers, CNP  Department of Urology

## 2023-11-07 NOTE — PROGRESS NOTES
Assessment and Plan:     Assessment: 68 year old female with a history of ovarian cancer, treated in 2017, with incidental finding of right renal mass, s/p right nephrectomy in 2017 (path results non-cancerous). Updated BETTY obtained earlier today is normal. We reviewed these results today and discussed the option to discontinue future scans, though she would prefer to keep doing these annually. She continues to struggle with TOÑO, for which she has seen Dr. Fregoso and discussed treatment options. We reviewed these again today, including observation, continued PFPT, midurethral sling, as well as PTNS, Axonics SNS, and Botox. She is intrigued by PTNS. She will continue to think about this options and do some research on them. Regarding her history of microscopic hematuria, will obtain a UA/UC now.     Plan:  -UA/UC now.   -Follow up with Dr. Fregoso to touch base regarding TOÑO interventions- she would like to see her in the spring.   -Follow up with me in one year with a BETTY beforehand.     Chandrika Byers, CNP  Department of Urology           Chief Complaint:   BETTY follow up, TOÑO         History of Present Illness:    Hafsa Corona is a 67 year old female with a history of ovarian cancer, s/p surgery and chemotherapy in 2017. At that time she was incidentally found to have a 3 cm mass in the superior pole of the right kidney, suspicious for RCC. Now s/p right nephrectomy in 01/2018 at [facility]. 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 Incidentally there was a 0.4 cm oncocytoma identified with the specimen as well. Cystoscopy completed by Dr. Mercado two years ago for microscopic hematuria and was unremarkable.    I last saw her one year ago, at which time her BETTY showed a normal left kidney.     More recently, she had a UA obtained on 9/1, which showed 10-25 RBCs with a negative culture.     Renal ultrasound repeated earlier today, with results showing the  following results:     IMPRESSION:  Right nephrectomy without suspicious findings within the nephrectomy  bed. Normal left kidney.    Of note, she also has a history of TOÑO, for which she has done pelvic floor PT. Also had a visit with Dr. Fregoso and surgical options were discussed. She would like to review these options again today.          Past Medical History:     Past Medical History:   Diagnosis Date    Anxiety state, unspecified     [id number]    Arthritis 2014    vague, gradual, not treated really, knees feel it    At high risk for breast cancer 09/27/2019    30.3% lifetime risk by SHERON model    Attention deficit disorder without mention of hyperactivity     Cancer (H) 7/2017 and 1/2018    ovarian and kidney cancers, both treated well    Chronic rhinitis     Depressive disorder lifetime    plus Anxiety plus ADD. Escitalipram, therapy, ADD meds maybe    Heart disease 1999    tachycardia and high cholesterol, managed    History of blood transfusion 07/2017    while in hospital for ovarian cancer    Hypertension 1999    tho BP was too LOW last week. past 12 years Generally OK    Panic disorder without agoraphobia     Pure hypercholesterolemia     Lipitor    Tachycardia, unspecified     9/1999-2/2004    Type II or unspecified type diabetes mellitus without mention of complication, not stated as uncontrolled     diagnosed 2004            Past Surgical History:     Past Surgical History:   Procedure Laterality Date    ABSCESS DRAINAGE Left     left leg     AS REMV KIDNEY,RADICAL Right     BIOPSY  7/2017 and 2/2018    ovarian and kidney cancer biopsies. also 1987 breast benign    BREAST CYST EXCISION  1985    BREAST SURGERY  1987    date unsure. benign breast cyst removed    CATARACT IOL, RT/LT Left 05/18/2018    CATARACT IOL, RT/LT Right 07/1318    COLONOSCOPY  2008 and 2013    polyps removed. Next due fall 2018    ENDOMETRIAL ABLATION  2008    EXCISE MASS BACK N/A 12/6/2021    Procedure: EXCISION, MASS, BACK;   Surgeon: Norris Vela MD;  Location: [facility] OR    HYSTERECTOMY TOTAL ABDOMINAL, BILATERAL SALPINGO-OOPHORECTOMY, NODE DISSECTION, COMBINED Left 7/7/2017    Procedure: COMBINED HYSTERECTOMY TOTAL ABDOMINAL, SALPINGO-OOPHORECTOMY, NODE DISSECTION;  Exploratory Laparotomy, Left Salpingo-Oophorectomy, Cancer Staging, Total Hysterectomy, Omentectomy, Evacuation of abdominal fluid, Lymph Node Dissection  Anesthesia Block ;  Surgeon: Serena Cole MD;  [facility]    HYSTERECTOMY, PAP NO LONGER INDICATED  07/07/2017    Laparotomy; for ovarian cancer staging    HYSTERECTOMY, PAP NO LONGER INDICATED      INSERT PORT VASCULAR ACCESS Right 8/21/2017    Procedure: INSERT PORT VASCULAR ACCESS;  Single Lumen Chest Power Port;  Surgeon: Stephen Mike PA-C;  [facility]    IR PORT REMOVAL RIGHT  11/12/2021    LYMPHADENECTOMY RETROPERITONEAL Bilateral 07/07/2017    Laparotomy; pelvics & para-aortics; for ovarian cancer staging    OMENTECTOMY  07/07/2017    Laparotomy; for ovarian cancer staging    ORTHOPEDIC SURGERY  1/2002    broken left jaw due to fall, 4 fractures, titanium plates    OTHER SURGICAL HISTORY  01/2002    OTHER SURGICAL HISTORYfacial surgery due to fall     PHACOEMULSIFICATION CLEAR CORNEA WITH STANDARD INTRAOCULAR LENS IMPLANT Right 7/13/2018    Procedure: PHACOEMULSIFICATION CLEAR CORNEA WITH STANDARD INTRAOCULAR LENS IMPLANT;  RIght Eye Phacoemulsification Clear Cornea with Standard Intraocular Lens Placement ;  Surgeon: Mary Jo Massey MD;  [facility]    PHACOEMULSIFICATION CLEAR CORNEA WITH TORIC INTRAOCULAR LENS IMPLANT Left 5/18/2018    Procedure: PHACOEMULSIFICATION CLEAR CORNEA WITH TORIC INTRAOCULAR LENS IMPLANT;  Left Eye Phacoemulsification with Toric Lens;  Surgeon: Mary Jo Massey MD;  [facility]    REMOVE PORT VASCULAR ACCESS Right 11/12/2021    Procedure: REMOVAL, VASCULAR ACCESS PORT right;  Surgeon: Afsaneh Das PA-C;  [place] [facility]    SALPINGO  "OOPHORECTOMY,R/L/KAYY Right 2008    Salpingo Oophorectomy, RT    SALPINGO OOPHORECTOMY,R/L/KAYY Left 07/07/2017    Laparotomy; for ovarian cancer staging    SALPINGOOPHORECTOMY  2008    SURGICAL HISTORY OF -       facial surgery d/t fall in 1/2002    SURGICAL HISTORY OF -       1985 removal of breast cyst    SURGICAL HISTORY OF -   2008    endometrial ablation    YAG CAPSULOTOMY OD (RIGHT EYE)  10/22/2018            Medications     Current Outpatient Medications   Medication    Acetaminophen (TYLENOL PO)    amphetamine-dextroamphetamine (ADDERALL XR) 20 MG 24 hr capsule    atorvastatin (LIPITOR) 80 MG tablet    B Complex Vitamins (VITAMIN B-COMPLEX PO)    CALCIUM-MAGNESIUM-VITAMIN D PO    Cholecalciferol (VITAMIN D) 1000 UNIT capsule    EPINEPHrine (ANY BX GENERIC EQUIV) 0.3 MG/0.3ML injection 2-pack    escitalopram (LEXAPRO) 20 MG tablet    fluticasone (FLONASE) 50 MCG/ACT nasal spray    Magnesium Hydroxide (MAGNESIA PO)    metFORMIN (GLUCOPHAGE XR) 500 MG 24 hr tablet    Multiple Vitamins-Minerals (MULTIVITAMIN ADULT PO)    nitroFURantoin macrocrystal-monohydrate (MACROBID) 100 MG capsule    phenazopyridine (PYRIDIUM) 200 MG tablet    Probiotic Product (PRO-BIOTIC BLEND PO)    rivaroxaban ANTICOAGULANT (XARELTO) 20 MG TABS tablet    tamoxifen (NOLVADEX) 10 MG tablet     Current Facility-Administered Medications   Medication    lidocaine (XYLOCAINE) 2 % external gel            Allergies:   Bee venom, Paclitaxel, Wasps [hornets], Yellow hornet venom [hornet venom], Yellow jacket venom [honey bee venom], and Sulfa antibiotics         Review of Systems:  From intake questionnaire   Negative 14 system review except as noted on HPI, nurse's note.         Physical Exam:   Patient is a 68 year old  female   Vitals: Blood pressure 106/73, pulse 68, height 1.575 m (5' 2\"), weight 92.1 kg (203 lb), last menstrual period 01/01/2009, not currently breastfeeding.  General Appearance Adult: Alert, no acute distress, " oriented  Lungs: no respiratory distress, or pursed lip breathing  Heart: No obvious jugular venous distension present  Abdomen: soft, nontender, no organomegaly or masses, Body mass index is 37.13 kg/m .  : deferred      Labs and Pathology:    I personally reviewed all applicable laboratory data and went over findings with patient  Significant for:    CBC RESULTS:  Recent Labs   Lab Test 09/01/23  1605 11/14/22  1327 08/18/22  1736 11/12/21  0807   WBC 6.7 6.8 6.0 6.0   HGB 11.3* 11.8 10.4* 11.5*    312 366 254        BMP RESULTS:  Recent Labs   Lab Test 08/04/23  1310 08/18/22  1736 07/06/22  1125 11/15/21  1806 11/12/21  0813 10/04/21  1653 07/18/21  2212 06/28/21  1437 03/27/20  1510 01/14/20  0327 01/03/20  1001    142 139  --   --  139   < > 143 137 143 138   POTASSIUM 4.4 3.7 4.1  --   --  3.8   < > 4.4 3.9 3.8 4.1   CHLORIDE 105 107 107  --   --  107   < > 109 105 109 106   CO2 23 22 23  --   --  25   < > 28 28 30 24   ANIONGAP 12 13 9  --   --  7   < > 6 4 4 8   * 151* 127*  --  134* 138*   < > 96 80 147* 118*   BUN 15.4 19.7 28  --   --  27   < > 25 19 35* 24   CR 1.15* 1.10* 1.10* 1.3*  --  1.18*   < > 1.20* 1.14* 1.30* 1.24*   GFRESTIMATED 51* 54* 54* 43*  --  48*   < > 47* 50* 43* 45*   GFRESTBLACK  --   --   --   --   --   --   --  54* 58* 50* 52*   COLE 9.3 9.1 8.7  --   --  8.9   < > 9.1 8.9 9.1 9.5    < > = values in this interval not displayed.       UA RESULTS:   Recent Labs   Lab Test 09/01/23  1517 11/15/22  1559 07/29/22  1932 07/27/21  1808   SG  --  1.025 1.025 >=1.030   URINEPH  --  6.0 5.0 5.0   NITRITE  --  Negative Negative Negative   RBCU 10-25* 3* 2-5*  --    WBCU 0-5 <1 5-10*  --        CALCIUM RESULTS  Lab Results   Component Value Date    COLE 9.3 08/04/2023    COLE 9.1 08/18/2022    COLE 8.7 07/06/2022    COLE 9.1 06/28/2021    COLE 8.9 03/27/2020    COLE 9.1 01/14/2020       INR  Recent Labs   Lab Test 08/18/22  2340 11/12/21  0807 08/21/17  0810 07/02/17  2255   INR  1.49* 0.93 1.05 1.17*

## 2023-11-07 NOTE — LETTER
11/7/2023       RE: Hafsa Coroan  [address]  [place] [address]     Dear Colleague,    Thank you for referring your patient, Hafsa Corona, to the [facility] [place] [facility]. Please see a copy of my visit note below.         Assessment and Plan:     Assessment: 68 year old female with a history of ovarian cancer, treated in 2017, with incidental finding of right renal mass, s/p right nephrectomy in 2017 (path results non-cancerous). Updated BETTY obtained earlier today is normal. We reviewed these results today and discussed the option to discontinue future scans, though she would prefer to keep doing these annually. She continues to struggle with TOÑO, for which she has seen Dr. Fregoso and discussed treatment options. We reviewed these again today, including observation, continued PFPT, midurethral sling, as well as PTNS, Axonics SNS, and Botox. She is intrigued by PTNS. She will continue to think about this options and do some research on them. Regarding her history of microscopic hematuria, will obtain a UA/UC now.     Plan:  -UA/UC now.   -Follow up with Dr. Fregoso to touch base regarding TOÑO interventions- she would like to see her in the spring.   -Follow up with me in one year with a BETTY beforehand.     Chandrika Byers, CNP  Department of Urology           Chief Complaint:   BETTY follow up, TOÑO         History of Present Illness:    Hafsa Corona is a 67 year old female with a history of ovarian cancer, s/p surgery and chemotherapy in 2017. At that time she was incidentally found to have a 3 cm mass in the superior pole of the right kidney, suspicious for RCC. Now s/p right nephrectomy in 01/2018 at [place]. 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 Incidentally there was a 0.4 cm oncocytoma identified with the specimen as well. Cystoscopy completed by Dr. Mercado  two years ago for microscopic hematuria and was unremarkable.    I last saw her one year ago, at which time her BETTY showed a normal left kidney.     More recently, she had a UA obtained on 9/1, which showed 10-25 RBCs with a negative culture.     Renal ultrasound repeated earlier today, with results showing the following results:     IMPRESSION:  Right nephrectomy without suspicious findings within the nephrectomy  bed. Normal left kidney.    Of note, she also has a history of TOÑO, for which she has done pelvic floor PT. Also had a visit with Dr. Fregoso and surgical options were discussed. She would like to review these options again today.          Past Medical History:     Past Medical History:   Diagnosis Date    Anxiety state, unspecified     [id number]    Arthritis 2014    vague, gradual, not treated really, knees feel it    At high risk for breast cancer 09/27/2019    30.3% lifetime risk by SHERON model    Attention deficit disorder without mention of hyperactivity     Cancer (H) 7/2017 and 1/2018    ovarian and kidney cancers, both treated well    Chronic rhinitis     Depressive disorder lifetime    plus Anxiety plus ADD. Escitalipram, therapy, ADD meds maybe    Heart disease 1999    tachycardia and high cholesterol, managed    History of blood transfusion 07/2017    while in hospital for ovarian cancer    Hypertension 1999    tho BP was too LOW last week. past 12 years Generally OK    Panic disorder without agoraphobia     Pure hypercholesterolemia     Lipitor    Tachycardia, unspecified     9/1999-2/2004    Type II or unspecified type diabetes mellitus without mention of complication, not stated as uncontrolled     diagnosed 2004            Past Surgical History:     Past Surgical History:   Procedure Laterality Date    ABSCESS DRAINAGE Left     left leg     AS REMV KIDNEY,RADICAL Right     BIOPSY  7/2017 and 2/2018    ovarian and kidney cancer biopsies. also 1987 breast benign    BREAST CYST EXCISION  1985     BREAST SURGERY  1987    date unsure. benign breast cyst removed    CATARACT IOL, RT/LT Left 05/18/2018    CATARACT IOL, RT/LT Right 07/1318    COLONOSCOPY  2008 and 2013    polyps removed. Next due fall 2018    ENDOMETRIAL ABLATION  2008    EXCISE MASS BACK N/A 12/6/2021    Procedure: EXCISION, MASS, BACK;  Surgeon: Norris Vela MD;  Location: [facility] OR    HYSTERECTOMY TOTAL ABDOMINAL, BILATERAL SALPINGO-OOPHORECTOMY, NODE DISSECTION, COMBINED Left 7/7/2017    Procedure: COMBINED HYSTERECTOMY TOTAL ABDOMINAL, SALPINGO-OOPHORECTOMY, NODE DISSECTION;  Exploratory Laparotomy, Left Salpingo-Oophorectomy, Cancer Staging, Total Hysterectomy, Omentectomy, Evacuation of abdominal fluid, Lymph Node Dissection  Anesthesia Block ;  Surgeon: Serena Cole MD;  [facility]    HYSTERECTOMY, PAP NO LONGER INDICATED  07/07/2017    Laparotomy; for ovarian cancer staging    HYSTERECTOMY, PAP NO LONGER INDICATED      INSERT PORT VASCULAR ACCESS Right 8/21/2017    Procedure: INSERT PORT VASCULAR ACCESS;  Single Lumen Chest Power Port;  Surgeon: Stephen Mike PA-C;  [facility]    IR PORT REMOVAL RIGHT  11/12/2021    LYMPHADENECTOMY RETROPERITONEAL Bilateral 07/07/2017    Laparotomy; pelvics & para-aortics; for ovarian cancer staging    OMENTECTOMY  07/07/2017    Laparotomy; for ovarian cancer staging    ORTHOPEDIC SURGERY  1/2002    broken left jaw due to fall, 4 fractures, titanium plates    OTHER SURGICAL HISTORY  01/2002    OTHER SURGICAL HISTORYfacial surgery due to fall     PHACOEMULSIFICATION CLEAR CORNEA WITH STANDARD INTRAOCULAR LENS IMPLANT Right 7/13/2018    Procedure: PHACOEMULSIFICATION CLEAR CORNEA WITH STANDARD INTRAOCULAR LENS IMPLANT;  RIght Eye Phacoemulsification Clear Cornea with Standard Intraocular Lens Placement ;  Surgeon: Mary Jo Massey MD;  [facility]    PHACOEMULSIFICATION CLEAR CORNEA WITH TORIC INTRAOCULAR LENS IMPLANT Left 5/18/2018    Procedure: PHACOEMULSIFICATION  CLEAR CORNEA WITH TORIC INTRAOCULAR LENS IMPLANT;  Left Eye Phacoemulsification with Toric Lens;  Surgeon: Mary Jo Massey MD;  [facility]    REMOVE PORT VASCULAR ACCESS Right 11/12/2021    Procedure: REMOVAL, VASCULAR ACCESS PORT right;  Surgeon: Afsaneh Das PA-C;  Location: [facility] OR    SALPINGO OOPHORECTOMY,R/L/KAYY Right 2008    Salpingo Oophorectomy, RT    SALPINGO OOPHORECTOMY,R/L/KAYY Left 07/07/2017    Laparotomy; for ovarian cancer staging    SALPINGOOPHORECTOMY  2008    SURGICAL HISTORY OF -       facial surgery d/t fall in 1/2002    SURGICAL HISTORY OF -       1985 removal of breast cyst    SURGICAL HISTORY OF -   2008    endometrial ablation    YAG CAPSULOTOMY OD (RIGHT EYE)  10/22/2018            Medications     Current Outpatient Medications   Medication    Acetaminophen (TYLENOL PO)    amphetamine-dextroamphetamine (ADDERALL XR) 20 MG 24 hr capsule    atorvastatin (LIPITOR) 80 MG tablet    B Complex Vitamins (VITAMIN B-COMPLEX PO)    CALCIUM-MAGNESIUM-VITAMIN D PO    Cholecalciferol (VITAMIN D) 1000 UNIT capsule    EPINEPHrine (ANY BX GENERIC EQUIV) 0.3 MG/0.3ML injection 2-pack    escitalopram (LEXAPRO) 20 MG tablet    fluticasone (FLONASE) 50 MCG/ACT nasal spray    Magnesium Hydroxide (MAGNESIA PO)    metFORMIN (GLUCOPHAGE XR) 500 MG 24 hr tablet    Multiple Vitamins-Minerals (MULTIVITAMIN ADULT PO)    nitroFURantoin macrocrystal-monohydrate (MACROBID) 100 MG capsule    phenazopyridine (PYRIDIUM) 200 MG tablet    Probiotic Product (PRO-BIOTIC BLEND PO)    rivaroxaban ANTICOAGULANT (XARELTO) 20 MG TABS tablet    tamoxifen (NOLVADEX) 10 MG tablet     Current Facility-Administered Medications   Medication    lidocaine (XYLOCAINE) 2 % external gel            Allergies:   Bee venom, Paclitaxel, Wasps [hornets], Yellow hornet venom [hornet venom], Yellow jacket venom [honey bee venom], and Sulfa antibiotics         Review of Systems:  From intake questionnaire   Negative 14 system review except as  "noted on HPI, nurse's note.         Physical Exam:   Patient is a 68 year old  female   Vitals: Blood pressure 106/73, pulse 68, height 1.575 m (5' 2\"), weight 92.1 kg (203 lb), last menstrual period 01/01/2009, not currently breastfeeding.  General Appearance Adult: Alert, no acute distress, oriented  Lungs: no respiratory distress, or pursed lip breathing  Heart: No obvious jugular venous distension present  Abdomen: soft, nontender, no organomegaly or masses, Body mass index is 37.13 kg/m .  : deferred      Labs and Pathology:    I personally reviewed all applicable laboratory data and went over findings with patient  Significant for:    CBC RESULTS:  Recent Labs   Lab Test 09/01/23  1605 11/14/22  1327 08/18/22  1736 11/12/21  0807   WBC 6.7 6.8 6.0 6.0   HGB 11.3* 11.8 10.4* 11.5*    312 366 254        BMP RESULTS:  Recent Labs   Lab Test 08/04/23  1310 08/18/22  1736 07/06/22  1125 11/15/21  1806 11/12/21  0813 10/04/21  1653 07/18/21  2212 06/28/21  1437 03/27/20  1510 01/14/20  0327 01/03/20  1001    142 139  --   --  139   < > 143 137 143 138   POTASSIUM 4.4 3.7 4.1  --   --  3.8   < > 4.4 3.9 3.8 4.1   CHLORIDE 105 107 107  --   --  107   < > 109 105 109 106   CO2 23 22 23  --   --  25   < > 28 28 30 24   ANIONGAP 12 13 9  --   --  7   < > 6 4 4 8   * 151* 127*  --  134* 138*   < > 96 80 147* 118*   BUN 15.4 19.7 28  --   --  27   < > 25 19 35* 24   CR 1.15* 1.10* 1.10* 1.3*  --  1.18*   < > 1.20* 1.14* 1.30* 1.24*   GFRESTIMATED 51* 54* 54* 43*  --  48*   < > 47* 50* 43* 45*   GFRESTBLACK  --   --   --   --   --   --   --  54* 58* 50* 52*   COLE 9.3 9.1 8.7  --   --  8.9   < > 9.1 8.9 9.1 9.5    < > = values in this interval not displayed.       UA RESULTS:   Recent Labs   Lab Test 09/01/23  1517 11/15/22  1559 07/29/22  1932 07/27/21  1808   SG  --  1.025 1.025 >=1.030   URINEPH  --  6.0 5.0 5.0   NITRITE  --  Negative Negative Negative   RBCU 10-25* 3* 2-5*  --    WBCU 0-5 <1 5-10*  " --        CALCIUM RESULTS  Lab Results   Component Value Date    COLE 9.3 08/04/2023    COLE 9.1 08/18/2022    COLE 8.7 07/06/2022    COLE 9.1 06/28/2021    COLE 8.9 03/27/2020    COLE 9.1 01/14/2020       INR  Recent Labs   Lab Test 08/18/22  2340 11/12/21  0807 08/21/17  0810 07/02/17  2255   INR 1.49* 0.93 1.05 1.17*

## 2023-11-24 DIAGNOSIS — N28.89 RENAL MASS: Primary | ICD-10-CM

## 2024-01-04 ENCOUNTER — TELEPHONE (OUTPATIENT)
Dept: HEMATOLOGY | Facility: CLINIC | Age: 69
End: 2024-01-04
Payer: COMMERCIAL

## 2024-01-04 NOTE — TELEPHONE ENCOUNTER
[id number]  Hafsa Corona  68 year old female  CBCD Diagnosis: Hx of DVT and SVT  CBCD Provider: Dr. Villarreal    Call received from Hafsa with questions about how to afford her Xarelto.     Hafsa was tearful when recounting her recent struggles with finances and difficulty affording her medication. She said that she recently was not able to afford her Xarelto for $85. Because of this, she has been opting to take it every other day instead of as prescribed. She denies any s/s of VTE at this time.    Hafsa states that with her current medicare, the cost of the drug is too high. She is in the process of trying to apply for medical assistance and is hoping that will bring the cost down or that the cost will be lessened if she's able to partake in the [employer] program in April.    Staff reviewed with Hafsa that they will ask the [facility] pharmacy team if there are other options for bringing down her Xarelto cost or if switching to Eliquis would be an option. Staff also discussed warfarin as a more affordable option. Hafsa said she's been on warfarin in the past without issue but does not like the need for INR checks or monitoring her vit K intake. Hafsa expressed urgency in her request as she is currently out of her Xarelto.    Staff asked Hafsa to continue to monitor for s/s of clotting and they will loop in additional members of the team to see what her options are.    Staff will call back 1/5/24 with an update.    Sobia DINGN, RN, PHN   [facility]   Office: [phone]  Fax: [phone]

## 2024-01-05 ENCOUNTER — TELEPHONE (OUTPATIENT)
Dept: HEMATOLOGY | Facility: CLINIC | Age: 69
End: 2024-01-05
Payer: COMMERCIAL

## 2024-01-05 DIAGNOSIS — Z86.718 PERSONAL HISTORY OF DVT (DEEP VEIN THROMBOSIS): Primary | ICD-10-CM

## 2024-01-05 NOTE — PROGRESS NOTES
[id number]  Hafsa Corona  68 year old female  CBCD Diagnosis: VTE  CBCD Provider: Phil    Called patient today. Offered her free trial of Eliquis which she applies for MA. Her currently insurance does not coverage DOACs very well. She would like us to assist her with coping the MA form (2 copies) and possibly assist her next week with this. Discussed with our  can print off copies of the MA form and call next week to see if she needs help. Patient to  medication around 3-4 pm    Reviewed side effects & that Eliquis 5 mg is taken every 12 hours.  Nevaeh Travis, MSN, RN, PHN -Nurse Clinician, [facility] [phone]

## 2024-01-05 NOTE — TELEPHONE ENCOUNTER
[facility]  Brief Social Work Note    Hafsa Corona is a 68 year old with a history of DVT and SVT who is followed by Dr. Villarreal. Writer received update from nursing staff that patient is interested in applying for MA and in need of resources.  Per RN, patient would like to pick-up information while picking up her medication from pharmacy later today.    Writer compiled resource packet with information on community resources, two copies of the Health Care Application for Special populations, as well as a listing of community agencies with certified navigators to assist her, should she need more help.  Encouraged her to reach out to writer if more assistance is needed, and contact information was provided.  Packet was provided to pharmacy by intake supervisor.    Thelma Harris, [facility]  Clinical   [facility]  Office: [phone]

## 2024-01-30 NOTE — DISCHARGE INSTRUCTIONS
[facility]     Home Care Following Cataract Surgery    If you have a gauze eye patch on, please do not remove it until it is removed by your doctor at your first appointment after your surgery.  You will start your eye drops the next day.    OR    If you only have a clear eye shield on, you may remove the eye shield when you get home and begin eye drops today as directed by your doctor.      Wear the clear eye shield for protection when sleeping for the next 5 days.      Do not rub the eye that had the operation.      Your eye might be sensitive to light.  Wearing sunglasses may be more comfortable for you.      You may have some discomfort and irritation.  Acetaminophen (Tylenol) or Ibuprofen (Advil) may be taken for discomfort. If pain persists please call your doctor s office.      Keep the eye that had the surgery dry. You may wash your hair, bathe or shower, but keep your eye closed while doing so.       Avoid bending over, strenuous activity or heavy lifting until this activity has been approved by your doctor.      You have a follow-up appointment with your doctor tomorrow at the [facility] ([phone]).  Bring all your prescribed eye drops with you to this follow-up appointment.        If you take glaucoma medications, bring them with you to your follow-up appointment tomorrow.      Use medication exactly as prescribed by your doctor. You may restart your regular home medications.       Call your doctor s office at [phone] if any of the following should occur:    - Any sudden vision changes, including decreased vision  - Nausea or severe headache  - Increase in pain that is not controlled with Acetaminophen (Tylenol) or Ibuprofen (Advil)  - Signs of infection (pus, increasing redness or tenderness)  - Severe sensitivity to light  - An increase in floaters (black spots in front of your vision)      [facility]  Home Care Following Anesthesia  For 24 hours after surgery:  1. Get plenty of rest.  A responsible adult must stay with you for at least 24 hours after you leave the surgery center.  2. Do not drive or use heavy equipment.  If you have weakness or tingling, don't drive or use heavy equipment until this feeling goes away.   3. Do not drink alcohol.   4. Avoid strenuous or risky activities.  Ask for help when climbing stairs.  5. You may feel lightheaded.  IF so, sit for a few minutes before standing.  Have someone help you get up.   6. If you have nausea (feel sick to your stomach): Drink only clear liquids such as apple juice, ginger ale, broth or 7-Up.  Rest may also help.  Be sure to drink enough fluids.  Move to a regular diet as you feel able.   7. You may have a slight fever.  Call the doctor if your fever is over 100 F (37.7 C) (taken under the tongue) or lasts longer than 24 hours.  8. You may have a dry mouth, a sore throat, muscle aches or trouble sleeping. These should go away after 24 hours.  9. Do not make important or legal decisions.               Tips for taking pain medications  To get the best pain relief possible, remember these points:    Take pain medications as directed, before pain becomes severe.    Pain medication can upset your stomach: taking it with food may help.    Constipation is a common side effect of pain medication. Drink plenty of  fluids.    Eat foods high in fiber. Take a stool softener if recommended by your doctor or pharmacist.    Do not drink alcohol, drive or operate machinery while taking pain medications.    Ask about other ways to control pain, such as with heat, ice or relaxation.    Tylenol/Acetaminophen Consumption  To help encourage the safe use of acetaminophen, the makers of TYLENOL  have lowered the maximum daily dose for single-ingredient Extra Strength TYLENOL  (acetaminophen) products sold in the [place] from 8 pills per day (4,000 mg) to 6 pills per day  [Use of Plain Language] : use of plain language (3,000 mg). The dosing interval has also changed from 2 pills every 4-6 hours to 2 pills every 6 hours.    If you feel your pain relief is insufficient, you may take Tylenol/Acetaminophen in addition to your narcotic pain medication.     Be careful not to exceed 3,000 mg of Tylenol/Acetaminophen in a 24 hour period from all sources.    If you are taking extra strength Tylenol/acetaminophen (500 mg), the maximum dose is 6 tablets in 24 hours.    If you are taking regular strength acetaminophen (325 mg), the maximum dose is 9 tablets in 24 hours.    Call a doctor for any of the followin. Signs of infection (fever, growing tenderness at the surgery site, a large amount of drainage or bleeding, severe pain, foul-smelling drainage, redness, swelling).  2. It has been over 8 to 10 hours since surgery and you are still not able to urinate (pass water).  3. Headache for over 24 hours.  4. Numbness, tingling or weakness the day after surgery (if you had spinal anesthesia).  Your doctor is:       Dr. Mary Jo Massey, Ophthalmology: [phone]               Or dial [phone] and ask for the resident on call for:  Ophthalmology  For emergency care, call the:  [facility] [place] [facility]:  [phone] (TTY for hearing impaired: [phone])                 [Adequate] : adequate [None] : none

## 2024-02-02 DIAGNOSIS — Z86.718 PERSONAL HISTORY OF DVT (DEEP VEIN THROMBOSIS): ICD-10-CM

## 2024-02-02 NOTE — TELEPHONE ENCOUNTER
Hafsa Corona [facility] for their history of DVT and thrombophlebitis.     They were last evaluated by our team on 05/24/2023 with the plan to remain on long term anticoagulation and return to clinic in 1 year.    Prescription updated and refills given.     This message will not be routed to  for scheduling as they reached out to patient a few days ago.    Charbel CORONEL RN  [facility]  Office: [phone]  Clinic: [phone]  Fax: [phone]

## 2024-02-06 DIAGNOSIS — Z91.89 AT HIGH RISK FOR BREAST CANCER: ICD-10-CM

## 2024-02-07 RX ORDER — TAMOXIFEN CITRATE 10 MG/1
TABLET ORAL
Qty: 45 TABLET | Refills: 1 | Status: SHIPPED | OUTPATIENT
Start: 2024-02-07

## 2024-02-08 ENCOUNTER — MYC MEDICAL ADVICE (OUTPATIENT)
Dept: ONCOLOGY | Facility: CLINIC | Age: 69
End: 2024-02-08

## 2024-02-15 ENCOUNTER — TELEPHONE (OUTPATIENT)
Dept: HEMATOLOGY | Facility: CLINIC | Age: 69
End: 2024-02-15
Payer: COMMERCIAL

## 2024-02-15 NOTE — TELEPHONE ENCOUNTER
[facility]   Brief Social Work Note    Hafsa Corona is a 68 year old female with a history of cancer-associated distal DVT who is followed by Dr. Villarreal.  Writer received request from [facility] to reach out to patient to discuss Medical Assistance.    Writer spoke with Hafsa at length today.  Reviewed MA application and options for submission. Hafsa plans to reach out to her PCP to inquire about getting connected to a financial resource worker for assistance.    Hafsa additionally had questions about general community resources, and she became tearful during our discussion. She endorsed a history of anxiety and ADHD, and reported she is currently feeling very overwhelmed.  She is connected with a psychiatrist, saw her therapist today, and will be starting a EMDR (therapy modality).  Affirmed her efforts at managing her mental health.  Provided education on MNChoices eval and EW that could be pursued.  Provided her with information on the [employer].  She provided email consent today and form was sent to HIM for scanning.    She did not have other questions or concerns and is aware an RN will reach out to discuss her medications.    Thelma Harris, [facility]  Clinical   [facility]  Office: [phone]

## 2024-02-19 ENCOUNTER — MYC MEDICAL ADVICE (OUTPATIENT)
Dept: HEMATOLOGY | Facility: CLINIC | Age: 69
End: 2024-02-19
Payer: COMMERCIAL

## 2024-02-19 ENCOUNTER — TELEPHONE (OUTPATIENT)
Dept: HEMATOLOGY | Facility: CLINIC | Age: 69
End: 2024-02-19
Payer: COMMERCIAL

## 2024-02-19 NOTE — TELEPHONE ENCOUNTER
[id number]  Hafsa Corona  68 year old female  CBCD Diagnosis: VTE  CBCD Provider: Phil    Received message that patient had connected with our team about switching her blood thinner back to Warfarin. Reached out by phone last week and today. Sent her a [place] message, but she has not read this yet.    Will wait for her call back.  Nevaeh Travis, MSN, RN, PHN -Nurse Clinician, [facility] [phone]

## 2024-02-23 ENCOUNTER — TELEPHONE (OUTPATIENT)
Dept: HEMATOLOGY | Facility: CLINIC | Age: 69
End: 2024-02-23
Payer: COMMERCIAL

## 2024-02-23 NOTE — TELEPHONE ENCOUNTER
[facility]    Hafsa Corona is a 68 year old female with a history of cancer-associated distal DVT who is followed by Dr. Villarreal.  Spoke with patient on 2/15 re: enrollment for MA.    Received call from patient today requesting to review options for MA enrollment.  Discussed FRW at her PCP, [employer], and [place]. She plans to reach out to [facility] for support and she denied other questions or concerns.    Thelma Harris, [facility]  Clinical   [place]  Office: [phone]

## 2024-03-06 ENCOUNTER — PRE VISIT (OUTPATIENT)
Dept: UROLOGY | Facility: CLINIC | Age: 69
End: 2024-03-06
Payer: COMMERCIAL

## 2024-03-06 NOTE — TELEPHONE ENCOUNTER
Reason for visit: follow up     Relevant information: discuss stress incontinence interventions    Records/imaging/labs/orders: in epic    Pt called: No need for a call    At Rooming: virtual visit    Manny De  3/6/2024  1:05 PM

## 2024-03-13 ENCOUNTER — VIRTUAL VISIT (OUTPATIENT)
Dept: UROLOGY | Facility: CLINIC | Age: 69
End: 2024-03-13
Payer: COMMERCIAL

## 2024-03-13 DIAGNOSIS — R35.0 FREQUENCY OF URINATION: ICD-10-CM

## 2024-03-13 DIAGNOSIS — N39.3 STRESS INCONTINENCE: Primary | ICD-10-CM

## 2024-03-13 PROCEDURE — 99214 OFFICE O/P EST MOD 30 MIN: CPT | Mod: 95 | Performed by: UROLOGY

## 2024-03-13 ASSESSMENT — PAIN SCALES - GENERAL: PAINLEVEL: NO PAIN (0)

## 2024-03-13 NOTE — PROGRESS NOTES
Virtual Visit Details    Type of service:  Video Visit   Video Start Time: 2:45 PM  Video End Time:3:01 PM    Originating Location (pt. Location): Home    Distant Location (provider location):  On-site  Platform used for Video Visit: Tadeo
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
## 2024-03-13 NOTE — PROGRESS NOTES
Reason for Visit:  f/u on stress urinary incontinence.    Clinical Data:  Hafsa Corona is a 69 year old female with stress and urge incontinence.  She had hx of ovarian cancer and is s/p hysterectomy and bilateral oophorectomy.  This was in 2017.  She then had a Nx in 2018 for renal mass but it was not cancerous.  We had discussed surgery with sling for stress incontinence.  She is nervous about a sling that it will affect her sexual function.    She also has some urge incontinence and we discussed PTNS, SNS and botox.      11/14/22Renal/Bladder Ultrasound: Normal  IMPRESSION:  Normal sonographic evaluation of the left kidney.      Assessment & Plan   68 y/o female with urinary incontinence both urge and stress.  She seems to be more bothered by the stress incontinence.  We again discussed different options including observation, continued PFPT, midurethral sling for the stress and periurethral bulking with bulkamid as options for this and is worried that it will cause sexual dysfunction.  We discussed that we cannot predict how ti will affect this.  Regarding the urgency or frequency we discussed medication as a first line therapy   -the patient will check with oncologist if she can be on vaginal estrogen cream for genitourinary syndrome of menopause.  -pt. Would like to think about it and will get back to me.  She may be interested in bulkamid    Marc Fregoso MD  [facility] [place]

## 2024-03-13 NOTE — PATIENT INSTRUCTIONS
-the patient will check with oncologist if she can be on vaginal estrogen cream.  -pt. Would like to think about it and will get back to me.

## 2024-03-13 NOTE — LETTER
3/13/2024       RE: Hafsa Corona  [address]  [place] [address]     Dear Colleague,    Thank you for referring your patient, Hafsa Corona, to the [facility] [place] [facility]. Please see a copy of my visit note below.    Virtual Visit Details    Type of service:  Video Visit   Video Start Time: 2:45 PM  Video End Time:3:01 PM    Originating Location (pt. Location): Home    Distant Location (provider location):  On-site  Platform used for Video Visit: Tadeo    Reason for Visit:  f/u on stress urinary incontinence.    Clinical Data:  Hafsa Corona is a 69 year old female with stress and urge incontinence.  She had hx of ovarian cancer and is s/p hysterectomy and bilateral oophorectomy.  This was in 2017.  She then had a Nx in 2018 for renal mass but it was not cancerous.  We had discussed surgery with sling for stress incontinence.  She is nervous about a sling that it will affect her sexual function.    She also has some urge incontinence and we discussed PTNS, SNS and botox.      11/14/22Renal/Bladder Ultrasound: Normal  IMPRESSION:  Normal sonographic evaluation of the left kidney.      Assessment & Plan  70 y/o female with urinary incontinence both urge and stress.  She seems to be more bothered by the stress incontinence.  We again discussed different options including observation, continued PFPT, midurethral sling for the stress and periurethral bulking with bulkamid as options for this and is worried that it will cause sexual dysfunction.  We discussed that we cannot predict how ti will affect this.  Regarding the urgency or frequency we discussed medication as a first line therapy   -the patient will check with oncologist if she can be on vaginal estrogen cream for genitourinary syndrome of menopause.  -pt. Would like to think about it and will get back to me.  She may be interested in bulkamid    Nissrine A.  MD Ildefonso  [facility] [place]

## 2024-03-16 ENCOUNTER — HEALTH MAINTENANCE LETTER (OUTPATIENT)
Age: 69
End: 2024-03-16

## 2024-04-09 DIAGNOSIS — E78.5 HYPERLIPIDEMIA LDL GOAL <100: ICD-10-CM

## 2024-04-09 RX ORDER — ATORVASTATIN CALCIUM 80 MG/1
TABLET, FILM COATED ORAL
Qty: 90 TABLET | Refills: 0 | Status: SHIPPED | OUTPATIENT
Start: 2024-04-09

## 2024-04-17 DIAGNOSIS — Z86.718 PERSONAL HISTORY OF DVT (DEEP VEIN THROMBOSIS): ICD-10-CM

## 2024-04-17 NOTE — TELEPHONE ENCOUNTER
[id number]  Hafsa Corona  69 year old female  CBCD Diagnosis: DVT  CBCD Provider: Phli Pereyra is currently applying for Eliquis financial assistance program. Application requires a signed prescription.     Patient is scheduled for follow-up with Dr. Villarreal on 5/29/24.     Two month local prescription signed by Dr. Villarreal sent to [employer] per patient request.    Judith Mercado RN, BSN, PCCN  Nurse Clinician    [facility]  [address] [place] [address], [address], [place] [address]   Office, direct: [phone]  Main office number: [phone]  Pronouns: She, her, hers

## 2024-04-19 ENCOUNTER — ANCILLARY PROCEDURE (OUTPATIENT)
Dept: MRI IMAGING | Facility: CLINIC | Age: 69
End: 2024-04-19
Attending: CLINICAL NURSE SPECIALIST
Payer: COMMERCIAL

## 2024-04-19 DIAGNOSIS — Z80.3 FAMILY HISTORY OF MALIGNANT NEOPLASM OF BREAST: ICD-10-CM

## 2024-04-19 DIAGNOSIS — R92.30 DENSE BREAST: ICD-10-CM

## 2024-04-19 DIAGNOSIS — Z12.39 BREAST CANCER SCREENING, HIGH RISK PATIENT: ICD-10-CM

## 2024-04-19 PROCEDURE — G1010 CDSM STANSON: HCPCS | Performed by: STUDENT IN AN ORGANIZED HEALTH CARE EDUCATION/TRAINING PROGRAM

## 2024-04-19 PROCEDURE — 77049 MRI BREAST C-+ W/CAD BI: CPT | Mod: MG | Performed by: STUDENT IN AN ORGANIZED HEALTH CARE EDUCATION/TRAINING PROGRAM

## 2024-04-19 PROCEDURE — A9585 GADOBUTROL INJECTION: HCPCS | Performed by: STUDENT IN AN ORGANIZED HEALTH CARE EDUCATION/TRAINING PROGRAM

## 2024-04-19 RX ORDER — GADOBUTROL 604.72 MG/ML
10 INJECTION INTRAVENOUS ONCE
Status: COMPLETED | OUTPATIENT
Start: 2024-04-19 | End: 2024-04-19

## 2024-04-19 RX ADMIN — GADOBUTROL 9 ML: 604.72 INJECTION INTRAVENOUS at 10:42

## 2024-05-29 ENCOUNTER — OFFICE VISIT (OUTPATIENT)
Dept: HEMATOLOGY | Facility: CLINIC | Age: 69
End: 2024-05-29
Attending: INTERNAL MEDICINE
Payer: COMMERCIAL

## 2024-05-29 VITALS
BODY MASS INDEX: 35.96 KG/M2 | SYSTOLIC BLOOD PRESSURE: 125 MMHG | WEIGHT: 195.4 LBS | HEART RATE: 90 BPM | TEMPERATURE: 97.9 F | DIASTOLIC BLOOD PRESSURE: 83 MMHG | OXYGEN SATURATION: 96 % | HEIGHT: 62 IN

## 2024-05-29 DIAGNOSIS — I25.10 CORONARY ARTERY DISEASE INVOLVING NATIVE HEART WITHOUT ANGINA PECTORIS, UNSPECIFIED VESSEL OR LESION TYPE: Primary | ICD-10-CM

## 2024-05-29 PROCEDURE — 99215 OFFICE O/P EST HI 40 MIN: CPT | Performed by: INTERNAL MEDICINE

## 2024-05-29 PROCEDURE — 99213 OFFICE O/P EST LOW 20 MIN: CPT | Performed by: INTERNAL MEDICINE

## 2024-05-29 RX ORDER — CLOPIDOGREL BISULFATE 75 MG/1
75 TABLET ORAL DAILY
Qty: 30 TABLET | Refills: 11 | Status: SHIPPED | OUTPATIENT
Start: 2024-05-29 | End: 2024-06-17

## 2024-05-29 NOTE — PROGRESS NOTES
[facility]  [address] [place] [address] [id number], [place] [address]  Phone: [phone], Fax: [phone].      Outpatient Visit Note:    Patient: Hafsa Corona  MRN: [id number]  : 1955  CLIFF: May 29, 2024      Reason for Visit:  Follow up for distal DVT, cancer-associated, and thrombophlebitis    Assessment:  In summary, Hafsa Corona is a 69 year old woman with a history of cancer-associated distal DVT, now in remission more than 5 years after treatment with surgery and chemotherapy who had stopped prophylaxis, but shortly thereafter had an episode of superficial thrombophlebitis, which was unprovoked, there was concerned about her risk of recurrence being sufficiently high to justify long-term anticoagulation.  Unfortunately in the interim, cost has been sufficiently high that she is unable to continue with secondary prophylaxis so she has been off therapy for several months.  Today we discussed alternative therapies including warfarin, aspirin and Plavix.  After weighing each of these she decided to proceed with Plavix especially since she can obtain this medicine for about $10 per month.  I explained that there is very little data on the use of this therapy in the setting, however we do know that aspirin is not effective secondary prophylaxis therapy for VTE and that it likely reduces the risk by about 50%.  Presumably Plavix is a similar or perhaps better efficacy.  We would plan to do this as bridging therapy until the price of Xarelto is negotiated down by Medicare, which we hear will occur in the next 2 years.      Plan:  1. Majority of today's visit was spent counseling the patient about her risk for recurrent clot and potential therapies for secondary prophylaxis  2.  Start clopidogrel 75 mg daily for secondary prophylaxis as a bridge to less costly rivaroxaban.  Hopefully this will be available in the next 2 years.  3.  For interruption of Plavix for surgery,  she should stop therapy 7 days prior.  She may resume at discussion of her surgeon, ideally 24 to 36 hours after surgery.  5. RTC 1 year and call with questions or concerns in the meantime      25 minutes spent by me on the date of the encounter doing chart review, review of test results, interpretation of tests, patient visit and documentation      William Villarreal MD   of Medicine  [employer]     -----------------------    Forward History:  Presented June 5 2017 with LLE pain and swelling, found to have a distal DVT  June 23, 2017 switched from warfarin to rivaroxaban, plan for 3 months of therapy and consider duration of therapy  July 2017 found to have new ovarian mass and underwent ex lap for resection of what was found to have stage 1C2 clear cell carcinoma.  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  We met last year, she decided to remain on anticoagulation indefinitely given that within a couple months of stopping her secondary prophylaxis she got superficial thrombophlebitis.  Unfortunately, in the interim the cost of anticoagulation is been too high for her so she has been off since February.  She reports no symptoms concerning for VTE such as leg swelling, pain, redness, chest pain or shortness of breath.  She is curious to see if there are other potential options that might be more  affordable.  Today we discussed options including warfarin, aspirin, Plavix.    Objective:  Exam:  Constitutional: Appears well, no distress  Eyes: no discharge, injection or icterus  Respiratory: no cough or labored breathing  Skin: no rashes or petechiae  Neurological: no deficits appreciated, speech is fluent  Psych: affect is normal    Labs:   Latest Reference Range & Units 08/04/23 13:10   Creatinine 0.51 - 0.95 mg/dL 1.15 (H)   (H): Data is abnormally high    Imaging:  I reviewed her most recent ultrasound report which showed superficial thrombophlebitis.

## 2024-06-04 ENCOUNTER — LAB (OUTPATIENT)
Dept: LAB | Facility: CLINIC | Age: 69
End: 2024-06-04
Attending: NURSE PRACTITIONER
Payer: COMMERCIAL

## 2024-06-04 DIAGNOSIS — C56.2 OVARIAN CANCER, LEFT (H): ICD-10-CM

## 2024-06-04 PROCEDURE — 99000 SPECIMEN HANDLING OFFICE-LAB: CPT | Performed by: PATHOLOGY

## 2024-06-04 PROCEDURE — 36415 COLL VENOUS BLD VENIPUNCTURE: CPT | Performed by: PATHOLOGY

## 2024-06-04 PROCEDURE — 86304 IMMUNOASSAY TUMOR CA 125: CPT | Performed by: OBSTETRICS & GYNECOLOGY

## 2024-06-05 LAB — CANCER AG125 SERPL-ACNC: 12 U/ML
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
## 2024-06-06 ENCOUNTER — ONCOLOGY VISIT (OUTPATIENT)
Dept: ONCOLOGY | Facility: CLINIC | Age: 69
End: 2024-06-06
Attending: NURSE PRACTITIONER
Payer: COMMERCIAL

## 2024-06-06 VITALS
BODY MASS INDEX: 36.05 KG/M2 | DIASTOLIC BLOOD PRESSURE: 75 MMHG | HEART RATE: 95 BPM | TEMPERATURE: 97.8 F | OXYGEN SATURATION: 95 % | WEIGHT: 197.1 LBS | RESPIRATION RATE: 16 BRPM | SYSTOLIC BLOOD PRESSURE: 114 MMHG

## 2024-06-06 DIAGNOSIS — Z85.43 ENCOUNTER FOR FOLLOW-UP SURVEILLANCE OF OVARIAN CANCER: ICD-10-CM

## 2024-06-06 DIAGNOSIS — Z08 ENCOUNTER FOR FOLLOW-UP SURVEILLANCE OF OVARIAN CANCER: ICD-10-CM

## 2024-06-06 DIAGNOSIS — C56.2 OVARIAN CANCER, LEFT (H): Primary | ICD-10-CM

## 2024-06-06 PROCEDURE — 99213 OFFICE O/P EST LOW 20 MIN: CPT | Performed by: NURSE PRACTITIONER

## 2024-06-06 PROCEDURE — 99214 OFFICE O/P EST MOD 30 MIN: CPT | Performed by: NURSE PRACTITIONER

## 2024-06-06 ASSESSMENT — PAIN SCALES - GENERAL: PAINLEVEL: NO PAIN (0)

## 2024-06-06 NOTE — NURSING NOTE
"Oncology Rooming Note    June 6, 2024 2:31 PM   Hafsa Corona is a 69 year old female who presents for:    Chief Complaint   Patient presents with    Oncology Clinic Visit     Ovarian cancer, left     Initial Vitals: /75 (BP Location: Right arm, Patient Position: Sitting, Cuff Size: Adult Regular)   Pulse 95   Temp 97.8  F (36.6  C) (Oral)   Resp 16   Wt 89.4 kg (197 lb 1.6 oz)   LMP 01/01/2009   SpO2 95%   BMI 36.05 kg/m   Estimated body mass index is 36.05 kg/m  as calculated from the following:    Height as of 5/29/24: 1.575 m (5' 2\").    Weight as of this encounter: 89.4 kg (197 lb 1.6 oz). Body surface area is 1.98 meters squared.  No Pain (0) Comment: Data Unavailable   Patient's last menstrual period was 01/01/2009.  Allergies reviewed: Yes  Medications reviewed: Yes    Medications: Medication refills not needed today.  Pharmacy name entered into EPIC:    [facility][id number] - [place], [place] - [address] AT [facility] [address]  [id number] - [place], [place] - [address]  [facility] [place], [place] - [phone]  [facility] [place], [place] - [address]  [id number] - [place], [place] - [address] [facility] [address]  [facility] [address] [place] [address] [address]VE [id number] - [place], [place] - [facility] [facility] [place], [place] - [address]    Frailty Screening:   Is the patient here for a new oncology consult visit in cancer care? 2. No      Clinical concerns: None       Estefania Michaels CMA            "
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
## 2024-06-14 ENCOUNTER — OFFICE VISIT (OUTPATIENT)
Dept: URGENT CARE | Facility: URGENT CARE | Age: 69
End: 2024-06-14
Payer: COMMERCIAL

## 2024-06-14 VITALS
DIASTOLIC BLOOD PRESSURE: 66 MMHG | OXYGEN SATURATION: 97 % | WEIGHT: 194 LBS | HEIGHT: 62 IN | RESPIRATION RATE: 16 BRPM | HEART RATE: 81 BPM | TEMPERATURE: 97.4 F | BODY MASS INDEX: 35.7 KG/M2 | SYSTOLIC BLOOD PRESSURE: 103 MMHG

## 2024-06-14 DIAGNOSIS — N30.01 ACUTE CYSTITIS WITH HEMATURIA: Primary | ICD-10-CM

## 2024-06-14 DIAGNOSIS — R30.0 DYSURIA: ICD-10-CM

## 2024-06-14 LAB
ALBUMIN UR-MCNC: >=300 MG/DL
APPEARANCE UR: ABNORMAL
BACTERIA #/AREA URNS HPF: ABNORMAL /HPF
BILIRUB UR QL STRIP: NEGATIVE
COLOR UR AUTO: ABNORMAL
GLUCOSE UR STRIP-MCNC: 100 MG/DL
HGB UR QL STRIP: ABNORMAL
KETONES UR STRIP-MCNC: 15 MG/DL
LEUKOCYTE ESTERASE UR QL STRIP: ABNORMAL
NITRATE UR QL: NEGATIVE
PH UR STRIP: 7 [PH] (ref 5–7)
RBC #/AREA URNS AUTO: ABNORMAL /HPF
SP GR UR STRIP: >=1.03 (ref 1–1.03)
SQUAMOUS #/AREA URNS AUTO: ABNORMAL /LPF
UROBILINOGEN UR STRIP-ACNC: 0.2 E.U./DL
WBC #/AREA URNS AUTO: ABNORMAL /HPF

## 2024-06-14 PROCEDURE — 99214 OFFICE O/P EST MOD 30 MIN: CPT | Performed by: PHYSICIAN ASSISTANT

## 2024-06-14 PROCEDURE — 87086 URINE CULTURE/COLONY COUNT: CPT | Performed by: PHYSICIAN ASSISTANT

## 2024-06-14 PROCEDURE — 81001 URINALYSIS AUTO W/SCOPE: CPT | Performed by: PHYSICIAN ASSISTANT

## 2024-06-14 RX ORDER — CEPHALEXIN 500 MG/1
500 CAPSULE ORAL 2 TIMES DAILY
Qty: 14 CAPSULE | Refills: 0 | Status: SHIPPED | OUTPATIENT
Start: 2024-06-14 | End: 2024-06-21

## 2024-06-14 ASSESSMENT — ENCOUNTER SYMPTOMS
FREQUENCY: 1
DYSURIA: 1
HEMATURIA: 1

## 2024-06-14 NOTE — PATIENT INSTRUCTIONS
UA is suggestive of bladder infection. You  will be treated with Keflex 500 mg PO BID x 7 days until cultures return and will adjust as necessary.  Take antibiotic as directed. Alternate acetaminophen and ibuprofen as needed for discomfort. May try OTC Pyridium as needed for bladder spasms. I recommend cranberry supplements and D-mannose as well. Increase fluids.

## 2024-06-14 NOTE — PROGRESS NOTES
Assessment & Plan      1. Acute cystitis with hematuria    -UA is suggestive of acute cystitis. Lack of flank pain, fever, nausea make me less concerned for an ascending infection. Patient will be treated with Keflex 500 mg PO BID x 7 days until cultures return and will adjust as necessary.     - cephALEXin (KEFLEX) 500 MG capsule; Take 1 capsule (500 mg) by mouth 2 times daily for 7 days  Dispense: 14 capsule; Refill: 0    2. Dysuria    - UA Macroscopic with reflex to Microscopic and Culture - Clinic Collect  - Urine Microscopic Exam  - Urine Culture    Results for orders placed or performed in visit on 06/14/24   UA Macroscopic with reflex to Microscopic and Culture - Clinic Collect     Status: Abnormal    Specimen: Urine, Clean Catch   Result Value Ref Range    Color Urine Red (A) Colorless, Straw, Light Yellow, Yellow    Appearance Urine Cloudy (A) Clear    Glucose Urine 100 (A) Negative mg/dL    Bilirubin Urine Negative Negative    Ketones Urine 15 (A) Negative mg/dL    Specific Gravity Urine >=1.030 1.003 - 1.035    Blood Urine Large (A) Negative    pH Urine 7.0 5.0 - 7.0    Protein Albumin Urine >=300 (A) Negative mg/dL    Urobilinogen Urine 0.2 0.2, 1.0 E.U./dL    Nitrite Urine Negative Negative    Leukocyte Esterase Urine Large (A) Negative   Urine Microscopic Exam     Status: Abnormal   Result Value Ref Range    Bacteria Urine Moderate (A) None Seen /HPF    RBC Urine  (A) 0-2 /HPF /HPF    WBC Urine  (A) 0-5 /HPF /HPF    Squamous Epithelials Urine Few (A) None Seen /LPF    Narrative    Microscopic exam performed on unspun urine.        Patient Instructions   UA is suggestive of bladder infection. You  will be treated with Keflex 500 mg PO BID x 7 days until cultures return and will adjust as necessary.  Take antibiotic as directed. Alternate acetaminophen and ibuprofen as needed for discomfort. May try OTC Pyridium as needed for bladder spasms. I recommend cranberry supplements and D-mannose as  well. Increase fluids.       Return for Follow up with Urology.    At the end of the encounter, I discussed results, diagnosis, medications. Discussed red flags for immediate return to clinic/ER, as well as indications for follow up if no improvement. Patient understood and agreed to plan. Patient was stable for discharge.    Aiyana Pereyra is a 69 year old female who presents to clinic today for the following health issues:  Chief Complaint   Patient presents with    Urinary Problem     Pain when urinary, blood and clots in urine, unable to hold it     Urgent Care     HPI   Patient reports urinary symptoms which started 1 day ago.  She reports dysuria, frequency, urgency, hematuria.  She has noticed clots of blood in the urine.  She is wearing a pad because she has urinary incontinency.  She denies fever or chills, nausea, vomiting.  Patient sees Urology for urinary symptoms.        Review of Systems   Genitourinary:  Positive for dysuria, frequency, hematuria and urgency.   All other systems reviewed and are negative.      Problem List:  2022-06: Infection due to 2019 novel coronavirus  2021-04: Mixed incontinence  2020-12: Chronic kidney disease, stage 3 (H)  2019-09: At high risk for breast cancer  2018-09: Abscess of left lower extremity  2018-09: ADD (attention deficit disorder) without hyperactivity  2018-09: Anxiety  2018-09: Depressive disorder  2018-09: Normocytic anemia  2018-09: Personal history of DVT (deep vein thrombosis)  2018-08: Encounter for follow-up surveillance of ovarian cancer  2018-07: Solitary kidney, acquired  2018-05: Angiomyolipoma of kidney  2018-05: Renal oncocytoma  2017-12: ACP (advance care planning)  2017-10: Chemotherapy-induced neuropathy (H24)  2017-09: Chemotherapy-induced neutropenia (H24)  2017-09: 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)  2017-09: Pain in joint, multiple sites  2017-08: Ovarian cancer (H)  2017-08: Ovarian cancer, left (H)  2017-08: Encounter  for long-term (current) use of medications  2017-07: Lymphedema  2017-07: Nausea  2017-07: Panic disorder (episodic paroxysmal anxiety)  2017-07: Pelvic and perineal pain  2017-07: Acute embolism and thrombosis of unspecified deep veins of   unspecified lower extremity (H)  2017-07: Cutaneous abscess of limb, unspecified  2017-07: Lower abdominal pain, unspecified  2017-07: Major depressive disorder, single episode, mild (H24)  2017-07: Vasomotor rhinitis  2017-07: S/P laparotomy  2017-07: Pelvic mass in female  2017-06: Long-term (current) use of anticoagulants [Z79.01]  2017-06: Deep vein thrombosis (DVT) (H) [I82.409]  2016-09: Major depressive disorder, recurrent episode, mild (H24)  2016-04: Intra-abdominal and pelvic swelling, mass and lump,   unspecified site  2016-02: Essential (primary) hypertension  2015-10: BMI > 35 with comorbidities  2014-01: Grief  2013-10: Coronary artery disease involving native coronary artery of   native heart  2013-01: Heel pain  2011-12: Allergic rhinitis due to other allergen  2011-11: Dyslipidemia  2011-11: Mild anemia  2010-10: Type 2 diabetes mellitus without complication (H)  2010-10: Mixed hyperlipidemia  2008-11: Chronic Maxillary Sinusitis  2005-07: Dysthymic disorder  2005-07: Generalized anxiety disorder  2005-07: Attention-deficit hyperactivity disorder, predominantly   inattentive type  2005-07: PANIC DISORDER   2005-07: VASOMOTOR RHINITIS  Tachycardia      Past Medical History:   Diagnosis Date    Anxiety state, unspecified     [id number]    Arthritis 2014    vague, gradual, not treated really, knees feel it    At high risk for breast cancer 09/27/2019    30.3% lifetime risk by SHERON model    Attention deficit disorder without mention of hyperactivity     Cancer (H) 7/2017 and 1/2018    ovarian and kidney cancers, both treated well    Chronic rhinitis     Depressive disorder lifetime    plus Anxiety plus ADD. Escitalipram, therapy, ADD meds maybe    Heart disease 1999  "   tachycardia and high cholesterol, managed    History of blood transfusion 07/2017    while in hospital for ovarian cancer    Hypertension 1999    tho BP was too LOW last week. past 12 years Generally OK    Panic disorder without agoraphobia     Pure hypercholesterolemia     Lipitor    Tachycardia, unspecified     9/1999-2/2004    Type II or unspecified type diabetes mellitus without mention of complication, not stated as uncontrolled     diagnosed 2004       Social History     Tobacco Use    Smoking status: Never     Passive exposure: Never    Smokeless tobacco: Never   Substance Use Topics    Alcohol use: Yes     Comment: Very infrequent, a bit of wine or beer 2x per month maybe           Objective    /66   Pulse 81   Temp 97.4  F (36.3  C) (Temporal)   Resp 16   Ht 1.575 m (5' 2\")   Wt 88 kg (194 lb)   LMP 01/01/2009   SpO2 97%   BMI 35.48 kg/m    Physical Exam  Vitals and nursing note reviewed.   Cardiovascular:      Rate and Rhythm: Normal rate and regular rhythm.   Pulmonary:      Effort: Pulmonary effort is normal.      Breath sounds: Normal breath sounds.   Abdominal:      General: Abdomen is flat.      Palpations: Abdomen is soft.      Tenderness: There is no abdominal tenderness. There is no right CVA tenderness or left CVA tenderness.   Lymphadenopathy:      Cervical: No cervical adenopathy.   Skin:     General: Skin is warm and dry.      Findings: No rash.   Neurological:      General: No focal deficit present.      Mental Status: She is alert and oriented to person, place, and time.   Psychiatric:         Mood and Affect: Mood normal.         Behavior: Behavior normal.              Shahana Cuellar PA-C    "

## 2024-06-16 LAB — BACTERIA UR CULT: NORMAL

## 2024-06-17 RX ORDER — CLOPIDOGREL BISULFATE 75 MG/1
75 TABLET ORAL DAILY
Qty: 90 TABLET | Refills: 3 | Status: SHIPPED | OUTPATIENT
Start: 2024-06-17

## 2024-07-13 ENCOUNTER — NURSE TRIAGE (OUTPATIENT)
Dept: NURSING | Facility: CLINIC | Age: 69
End: 2024-07-13

## 2024-07-13 NOTE — TELEPHONE ENCOUNTER
Triage Call:    Left message for patient to call back to discuss Covid treatment.    Danielle Serrano RN  07/13/24 6:44 PM  [facility] Nurse Advisor

## 2024-07-13 NOTE — TELEPHONE ENCOUNTER
"\"I have COVID, I tested positive today (my 2nd home test). SX began 7/9/2024 Tuesday, I did not think it was COVID at first, the first test was negative. My friend had COVID. I am recovering. I would like to get Paxlovid or other RX for COVID. I tried to do an Evisit via My Chart, but it directed me to be seen.   No difficulty breathing. Tuesday-Thursday I had a headache=\"4-6\". No HA now. Weds-Thurs I felt feverish and sweaty, but no longer. (Not checked with thermometer).\"    COVID Positive/Requesting COVID treatment    Patient is positive for COVID and requesting treatment options.    Date of positive COVID test (PCR or at home)? 7/13/2024  Current COVID symptoms: fever or chills, cough, fatigue, muscle or body aches, headache, sore throat, congestion or runny nose, and diarrhea  Date COVID symptoms began: 7/9/2024 Tuesday.   SX are improving. Today is day 4.     Message should be routed to clinic RN pool. Best phone number to use for call back: [phone]: please leave a message if no answer with call back number.     Pharmacy: [facility] on [address], 24 hrs in [facility].    Sophie Jim RN Triage Nurse Advisor 4:25 PM 7/13/2024  Reason for Disposition   [1] HIGH RISK patient (e.g., weak immune system, age > 64 years, obesity with BMI 30 or higher, pregnant, chronic lung disease or other chronic medical condition) AND [2] COVID symptoms (e.g., cough, fever)  (Exceptions: Already seen by PCP and no new or worsening symptoms.)    Additional Information   Negative: SEVERE difficulty breathing (e.g., struggling for each breath, speaks in single words)   Negative: Difficult to awaken or acting confused (e.g., disoriented, slurred speech)   Negative: Bluish (or gray) lips or face now   Negative: Shock suspected (e.g., cold/pale/clammy skin, too weak to stand, low BP, rapid pulse)   Negative: Sounds like a life-threatening emergency to the triager   Negative: [1] Diagnosed or suspected COVID-19 AND [2] " symptoms lasting 3 or more weeks   Negative: [1] COVID-19 exposure AND [2] no symptoms   Negative: COVID-19 vaccine reaction suspected (e.g., fever, headache, muscle aches) occurring 1 to 3 days after getting vaccine   Negative: COVID-19 vaccine, questions about   Negative: [1] Lives with someone known to have influenza (flu test positive) AND [2] flu-like symptoms (e.g., cough, runny nose, sore throat, SOB; with or without fever)   Negative: [1] Possible COVID-19 symptoms AND [2] triager concerned about severity of symptoms or other causes   Negative: COVID-19 and breastfeeding, questions about   Negative: SEVERE or constant chest pain or pressure  (Exception: Mild central chest pain, present only when coughing.)   Negative: MODERATE difficulty breathing (e.g., speaks in phrases, SOB even at rest, pulse 100-120)   Negative: [1] Headache AND [2] stiff neck (can't touch chin to chest)   Negative: Oxygen level (e.g., pulse oximetry) 90 percent or lower   Negative: Chest pain or pressure  (Exception: MILD central chest pain, present only when coughing.)   Negative: [1] Drinking very little AND [2] dehydration suspected (e.g., no urine > 12 hours, very dry mouth, very lightheaded)   Negative: Patient sounds very sick or weak to the triager   Negative: MILD difficulty breathing (e.g., minimal/no SOB at rest, SOB with walking, pulse <100)   Negative: Fever > 103 F (39.4 C)   Negative: [1] Fever > 101 F (38.3 C) AND [2] age > 60 years   Negative: [1] Fever > 100.0 F (37.8 C) AND [2] bedridden (e.g., CVA, chronic illness, recovering from surgery)   Negative: Oxygen level (e.g., pulse oximetry) 91 to 94 percent    Protocols used: Coronavirus (COVID-19) Diagnosed or [id number]

## 2024-07-14 NOTE — TELEPHONE ENCOUNTER
Nurse Triage SBAR    Patient is returning call for COVID treatments. Advised that nurse will be on the workflow today at 5 pm and she should anticipate a call back then.       Brie Nichols RN 2:01 PM 7/14/2024

## 2024-07-14 NOTE — TELEPHONE ENCOUNTER
RN COVID TREATMENT VISIT  07/14/24      The patient has been triaged and does not require a higher level of care.    Hafsa Corona  69 year old  Current weight? 194 lb (88 kg)    Has the patient been seen by a primary care provider at an [facility] or [facility] within the past two years? Yes.   Have you been in close proximity to/do you have a known exposure to a person with a confirmed case of influenza? No.     General treatment eligibility:  Date of positive COVID test (PCR or at home)?  7/13/24    Are you or have you been hospitalized for this COVID-19 infection? No.   Have you received monoclonal antibodies or antiviral treatment for COVID-19 since this positive test? No.   Do you have any of the following conditions that place you at risk of being very sick from COVID-19?   - Age 50 years or older  Yes, patient has at least one high risk condition as noted above.     Current COVID symptoms:   - cough  Yes. Patient has at least one symptom as selected.     How many days since symptoms started? 5 days or less. Established patient, 12 years or older weighing at least 88.2 lbs, who has symptoms that started in the past 5 days, has not been hospitalized nor received treatment already, and is at risk for being very sick from COVID-19.     Treatment eligibility by RN:  Are you currently pregnant or nursing? No  Do you have a clinically significant hypersensitivity to nirmatrelvir or ritonavir, or toxic epidermal necrolysis (TEN) or Molina-Stewart Syndrome? No  Do you have a history of hepatitis, any hepatic impairment on the Problem List (such as Child-Orellana Class C, cirrhosis, fatty liver disease, alcoholic liver disease), or was the last liver lab (hepatic panel, ALT, AST, ALK Phos, bilirubin) elevated in the past 6 months? No  Do you have any history of severe renal impairment (eGFR < 30mL/min)? YES    Is patient eligible to continue? No, patient does not meet all eligibility  requirements for the RN COVID treatment (as denoted by yes response(s) above). Patient informed they will need a virtual provider visit to assess treatment options.  Patient will be transferred to a  at the end of this call.   Danielle Serrano RN

## 2024-07-15 ENCOUNTER — VIRTUAL VISIT (OUTPATIENT)
Dept: URGENT CARE | Facility: CLINIC | Age: 69
End: 2024-07-15
Payer: COMMERCIAL

## 2024-07-15 DIAGNOSIS — R05.2 SUBACUTE COUGH: Primary | ICD-10-CM

## 2024-07-15 PROCEDURE — 99441 PR PHYSICIAN TELEPHONE EVALUATION 5-10 MIN: CPT | Mod: 93

## 2024-07-15 RX ORDER — BENZONATATE 200 MG/1
200 CAPSULE ORAL 3 TIMES DAILY PRN
Qty: 30 CAPSULE | Refills: 0 | Status: SHIPPED | OUTPATIENT
Start: 2024-07-15

## 2024-07-15 NOTE — PROGRESS NOTES
Phone appointment:  Duration: 10 minutes        CHIEF COMPLAINT: COVID infection.      HPI: Patient is a 69-year-old female who is on day 6 of COVID symptoms.  Symptoms consist of cough, headache, feverish, sore throat, diarrhea and fatigue.  Most of her symptoms are improving.  No shortness of breath.  Patient has a cold morbidities of age, chronic kidney disease and diabetes.  Patient tells a story of being very frustrated and not being able to schedule an appointment for discussion regarding paxlovid.      ROS: See HPI otherwise normal.    Allergies   Allergen Reactions    Bee Venom Anaphylaxis and Swelling     Wasps, Yellow Jackets and Hornets    Paclitaxel Anaphylaxis and Difficulty breathing    Wasps [Hornets] Anaphylaxis    Yellow Hornet Venom [Hornet Venom] Anaphylaxis and Swelling    Yellow Jacket Venom [Honey Bee Venom] Anaphylaxis and Swelling    Sulfa Antibiotics Hives and Rash      Current Outpatient Medications   Medication Sig Dispense Refill    benzonatate (TESSALON) 200 MG capsule Take 1 capsule (200 mg) by mouth 3 times daily as needed for cough 30 capsule 0    Acetaminophen (TYLENOL PO) Take 500 mg by mouth 2 tabs prn pain (monthly)      amphetamine-dextroamphetamine (ADDERALL XR) 20 MG 24 hr capsule Take 20 mg by mouth daily      atorvastatin (LIPITOR) 80 MG tablet TAKE 1 TABLET(80 MG) BY MOUTH DAILY 90 tablet 0    B Complex Vitamins (VITAMIN B-COMPLEX PO) Take 50 mg by mouth       CALCIUM-MAGNESIUM-VITAMIN D PO Take 2 tablets by mouth daily      Cholecalciferol (VITAMIN D) 1000 UNIT capsule Take 2,000 Units by mouth daily       clopidogrel (PLAVIX) 75 MG tablet Take 1 tablet (75 mg) by mouth daily 90 tablet 3    EPINEPHrine (ANY BX GENERIC EQUIV) 0.3 MG/0.3ML injection 2-pack Inject 0.3 mLs (0.3 mg) into the muscle once as needed for anaphylaxis 0.3 mL PRN    escitalopram (LEXAPRO) 20 MG tablet Take 1 tablet (20 mg) by mouth daily 90 tablet 0    fluticasone (FLONASE) 50 MCG/ACT nasal spray SHAKE  LIQUID AND USE 2 SPRAYS IN EACH NOSTRIL DAILY 48 g 2    Magnesium Hydroxide (MAGNESIA PO) Take 500 mg by mouth      metFORMIN (GLUCOPHAGE XR) 500 MG 24 hr tablet TAKE 2 TABLETS BY MOUTH EVERY DAY WITH THE EVENING MEAL 180 tablet 3    Multiple Vitamins-Minerals (MULTIVITAMIN ADULT PO) Take 1 tablet by mouth daily      nitroFURantoin macrocrystal-monohydrate (MACROBID) 100 MG capsule TAKE 1 CAPSULE BY MOUTH AFTER INTERCOURSE AS NEEDED 30 capsule PRN    phenazopyridine (PYRIDIUM) 200 MG tablet Take 1 tablet (200 mg) by mouth 3 times daily as needed for irritation 9 tablet 0    Probiotic Product (PRO-BIOTIC BLEND PO) Take 1 capsule by mouth daily Enzymatic Therapy-probiotic pearls      tamoxifen (NOLVADEX) 10 MG tablet TAKE 1/2 TABLET BY MOUTH ONCE DAILY. CUT EACH TABLET WITH PILL CUTTER TO ENSURE 5MG DAILY DOSE. 45 tablet 1         PE: No acute distress on phone appointment.  Patient is alert and oriented.  She is nondyspneic sounding speaking in full sentences.        TREATMENT: None.      ASSESSMENT: 69-year-old female with multiple comorbidities who is on day 6 of COVID.  I discussed remdesivir with patient and that I believe it can be used up to 7 days of symptoms and patient is not interested.  She expressed her significant frustration with the inability to get proper appointment but is glad that her symptoms are improving.      DIAGNOSIS: COVID infection.      PLAN: Benzonatate ordered for cough.  Patient is to be seen at any time for any worsening symptoms or no improvement in 1 week.

## 2024-07-17 DIAGNOSIS — E11.9 TYPE 2 DIABETES MELLITUS WITHOUT COMPLICATION, WITHOUT LONG-TERM CURRENT USE OF INSULIN (H): ICD-10-CM

## 2024-07-17 DIAGNOSIS — J32.0 CHRONIC MAXILLARY SINUSITIS: ICD-10-CM

## 2024-07-18 RX ORDER — FLUTICASONE PROPIONATE 50 MCG
SPRAY, SUSPENSION (ML) NASAL
Qty: 48 G | Refills: 0 | Status: SHIPPED | OUTPATIENT
Start: 2024-07-18

## 2024-07-19 RX ORDER — METFORMIN HCL 500 MG
TABLET, EXTENDED RELEASE 24 HR ORAL
Qty: 180 TABLET | Refills: 3 | Status: SHIPPED | OUTPATIENT
Start: 2024-07-19

## 2024-08-08 DIAGNOSIS — E11.9 TYPE 2 DIABETES MELLITUS WITHOUT COMPLICATION, WITHOUT LONG-TERM CURRENT USE OF INSULIN (H): ICD-10-CM

## 2024-08-08 RX ORDER — METFORMIN HCL 500 MG
TABLET, EXTENDED RELEASE 24 HR ORAL
Qty: 180 TABLET | Refills: 3 | OUTPATIENT
Start: 2024-08-08

## 2024-10-07 DIAGNOSIS — E78.5 HYPERLIPIDEMIA LDL GOAL <100: ICD-10-CM

## 2024-10-08 RX ORDER — ATORVASTATIN CALCIUM 80 MG/1
TABLET, FILM COATED ORAL
Qty: 90 TABLET | Refills: 0 | Status: SHIPPED | OUTPATIENT
Start: 2024-10-08

## 2024-10-12 ENCOUNTER — HEALTH MAINTENANCE LETTER (OUTPATIENT)
Age: 69
End: 2024-10-12

## 2024-10-18 DIAGNOSIS — J32.0 CHRONIC MAXILLARY SINUSITIS: ICD-10-CM

## 2024-10-20 DIAGNOSIS — E78.5 HYPERLIPIDEMIA LDL GOAL <100: ICD-10-CM

## 2024-10-20 RX ORDER — ATORVASTATIN CALCIUM 80 MG/1
TABLET, FILM COATED ORAL
Qty: 90 TABLET | Refills: 0 | OUTPATIENT
Start: 2024-10-20

## 2024-10-20 RX ORDER — FLUTICASONE PROPIONATE 50 MCG
SPRAY, SUSPENSION (ML) NASAL
Qty: 16 G | Refills: 0 | Status: SHIPPED | OUTPATIENT
Start: 2024-10-20

## 2024-10-24 NOTE — PROGRESS NOTES
Oncology Risk Management Consultation:  Date on this visit: 10/29/2024    Hafsa Corona requires heightened screening and surveillance for her higher risk of breast cancer, secondary to a family history of breast cancer in her mother at age 63. 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 She is considered to at high risk for breast cancer and has a 30.3% lifetime risk for breast cancer by the SHERON model. She has a 10.8% 5 year risk by the SHERON model.      Primary Physician:  MARLON Reilly-CNP     History Of Present Illness:  Ms. Corona is a very pleasant 69 year old female who presents with a family history of breast cancer.      Genetic testing: NEGATIVE  12/8/2017 - Negative for mutations in the AIP, ALK, APC, COSTA, BAP1, BARD1, BLM, BRCA1, BRCA2, BRIP1, BMPR1A, CDH1, CDK4, CDKN1B, CDKN2A, CHEK2, DICER1, EPCAM, FANCC, FH, FLCN, GALNT12, GREM1, HOXB13, MAX, MEN1, MET, MITF, MLH1, MRE11A, MSH2, MSH6, MUTYH, NBN, NF1, NF2, PALB2, PHOX2B, PMS2, POLD1, POLE, POT1, DYSGR1S, PTCH1, PTEN, RAD50, RAD51C, RAD51D, RB1, RET, SDHA, SDHAF2, SDHB, SDHC, SDHD, SMAD4, SMARCA4, SMARCB1, SMARCE1, STK11, SUFU, QYVM932, TP53, TSC1, TSC2, VHL, and XRCC2 genes using a Cancer-Next Expanded panel through [employer].     Pertinent history:  2007- R salpingo oophorectomy for abnormal imaging (enlarged ovary), biopsy showed endometriosis  7/7/2017 - Stage 1C2 Clear cell carcinoma of the L ovary, s/p HELENE/LSO and chemotherapy.19 cm cystic mass  1/24/18: R nephrectomy with Dr. 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   Nulliparous.  Menarche at 10.  Menopause: age 54  Hx of OCPs x6 years.  No hx of HRT.  Density: heterogeneously dense  No history of breast biopsy.  Hx of breast cyst in 1987, aspirated   No history of hyperplasia, atypia, or malignancy  Completed five years of 5 mg daily low dose tamoxifen. She took this from October, 2019-October 2024.       Pertinent screening history:  6/2011 - Screening mammogram, normal by report   2/1/2014 - Screening mammogram, normal by report  9/15/2017 - Screening mammogram, BiRads1.    4/5/2018 - Breast MRI, BiRads2.  9/21/2018: Screening tomosynthesis mammogram, BI-RADS 1.  4/9/2019: Breast MRI, BI-RADS 1.  9/27/2019- Screening tomosynthesis mammogram, BiRads1  10/1/2020- Screening mammogram, BiRads1  3/29/2021- Breast MRI, BiRads1  10/13/2021- Screening tomosynthesis mammogram, BiRads1  4/6/2022- Breast MRI, BiRads1  10/21/2022-screening tomosynthesis mammogram, BI-RADS 0 for Possible developing focal asymmetries 6:30 anteriorly on the left. : BI-RADS CATEGORY: 0  10/27/2022: Left diagnostic mammogram and left ultrasound, both BI-RADS 1  4/13/2023: Breast MRI, BI-RADS 1  10/24/2023: Screening tomosynthesis mammogram, BiRads1  4/19/2024: Breast MRI, BiRads1       At this visit, she denies asymmetry, lumps, masses, thickening, pain, nipple discharge and skin changes    Past Medical/Surgical History:  Past Medical History:   Diagnosis Date    Anxiety state, unspecified     [id number]    Arthritis 2014    vague, gradual, not treated really, knees feel it    At high risk for breast cancer 09/27/2019    30.3% lifetime risk by SHERON model    Attention deficit disorder without mention of hyperactivity     Cancer (H) 7/2017 and 1/2018    ovarian and kidney cancers, both treated well    Chronic rhinitis     Depressive disorder lifetime    plus Anxiety plus ADD. Escitalipram, therapy, ADD meds maybe    Heart disease 1999    tachycardia and high cholesterol, managed    History of blood transfusion 07/2017    while in hospital for ovarian cancer    Hypertension 1999    tho BP was too LOW last week. past 12 years Generally OK    Panic disorder without agoraphobia     Pure hypercholesterolemia     Lipitor    Tachycardia, unspecified     9/1999-2/2004    Type II or unspecified type diabetes mellitus without mention of complication, not  stated as uncontrolled     diagnosed 2004     Past Surgical History:   Procedure Laterality Date    ABSCESS DRAINAGE Left     left leg     AS REMV KIDNEY,RADICAL Right     BIOPSY  7/2017 and 2/2018    ovarian and kidney cancer biopsies. also 1987 breast benign    BREAST CYST EXCISION  1985    BREAST SURGERY  1987    date unsure. benign breast cyst removed    CATARACT IOL, RT/LT Left 05/18/2018    CATARACT IOL, RT/LT Right 07/1318    COLONOSCOPY  2008 and 2013    polyps removed. Next due fall 2018    ENDOMETRIAL ABLATION  2008    EXCISE MASS BACK N/A 12/6/2021    Procedure: EXCISION, MASS, BACK;  Surgeon: Norris Vela MD;  Location: [facility] OR    HYSTERECTOMY TOTAL ABDOMINAL, BILATERAL SALPINGO-OOPHORECTOMY, NODE DISSECTION, COMBINED Left 7/7/2017    Procedure: COMBINED HYSTERECTOMY TOTAL ABDOMINAL, SALPINGO-OOPHORECTOMY, NODE DISSECTION;  Exploratory Laparotomy, Left Salpingo-Oophorectomy, Cancer Staging, Total Hysterectomy, Omentectomy, Evacuation of abdominal fluid, Lymph Node Dissection  Anesthesia Block ;  Surgeon: Serena Cole MD;  Location: [facility] OR    HYSTERECTOMY, PAP NO LONGER INDICATED  07/07/2017    Laparotomy; for ovarian cancer staging    HYSTERECTOMY, PAP NO LONGER INDICATED      INSERT PORT VASCULAR ACCESS Right 8/21/2017    Procedure: INSERT PORT VASCULAR ACCESS;  Single Lumen Chest Power Port;  Surgeon: Stephen Mike PA-C;  [facility]    IR PORT REMOVAL RIGHT  11/12/2021    LYMPHADENECTOMY RETROPERITONEAL Bilateral 07/07/2017    Laparotomy; pelvics & para-aortics; for ovarian cancer staging    OMENTECTOMY  07/07/2017    Laparotomy; for ovarian cancer staging    ORTHOPEDIC SURGERY  1/2002    broken left jaw due to fall, 4 fractures, titanium plates    OTHER SURGICAL HISTORY  01/2002    OTHER SURGICAL HISTORYfacial surgery due to fall     PHACOEMULSIFICATION CLEAR CORNEA WITH STANDARD INTRAOCULAR LENS IMPLANT Right 7/13/2018    Procedure: PHACOEMULSIFICATION CLEAR CORNEA  WITH STANDARD INTRAOCULAR LENS IMPLANT;  RIght Eye Phacoemulsification Clear Cornea with Standard Intraocular Lens Placement ;  Surgeon: Mary Jo Massey MD;  [facility]    PHACOEMULSIFICATION CLEAR CORNEA WITH TORIC INTRAOCULAR LENS IMPLANT Left 5/18/2018    Procedure: PHACOEMULSIFICATION CLEAR CORNEA WITH TORIC INTRAOCULAR LENS IMPLANT;  Left Eye Phacoemulsification with Toric Lens;  Surgeon: Mary Jo Massey MD;  [facility]    REMOVE PORT VASCULAR ACCESS Right 11/12/2021    Procedure: REMOVAL, VASCULAR ACCESS PORT right;  Surgeon: Afsaneh Das PA-C;  Location: [facility] OR    SALPINGO OOPHORECTOMY,R/L/KAYY Right 2008    Salpingo Oophorectomy, RT    SALPINGO OOPHORECTOMY,R/L/KAYY Left 07/07/2017    Laparotomy; for ovarian cancer staging    SALPINGOOPHORECTOMY  2008    SURGICAL HISTORY OF -       facial surgery d/t fall in 1/2002    SURGICAL HISTORY OF -       1985 removal of breast cyst    SURGICAL HISTORY OF -   2008    endometrial ablation    YAG CAPSULOTOMY OD (RIGHT EYE)  10/22/2018       Allergies:  Allergies as of 10/29/2024 - Reviewed 10/29/2024   Allergen Reaction Noted    Bee venom Anaphylaxis and Swelling 05/10/2018    Paclitaxel Anaphylaxis and Difficulty breathing 08/30/2017    Wasps [hornets] Anaphylaxis 09/03/2009    Yellow hornet venom [hornet venom] Anaphylaxis and Swelling 05/10/2018    Yellow jacket venom [honey bee venom] Anaphylaxis and Swelling 05/10/2018    Sulfa antibiotics Hives and Rash 07/19/2005       Current Medications:  Current Outpatient Medications   Medication Sig Dispense Refill    Acetaminophen (TYLENOL PO) Take 500 mg by mouth as needed for mild pain. 2 tabs prn pain (monthly)      atorvastatin (LIPITOR) 80 MG tablet TAKE 1 TABLET(80 MG) BY MOUTH DAILY 90 tablet 0    B Complex Vitamins (VITAMIN B-COMPLEX PO) Take 50 mg by mouth       CALCIUM-MAGNESIUM-VITAMIN D PO Take 2 tablets by mouth daily      Cholecalciferol (VITAMIN D) 1000 UNIT capsule Take 2,000 Units by mouth  daily       clopidogrel (PLAVIX) 75 MG tablet Take 1 tablet (75 mg) by mouth daily 90 tablet 3    EPINEPHrine (ANY BX GENERIC EQUIV) 0.3 MG/0.3ML injection 2-pack Inject 0.3 mLs (0.3 mg) into the muscle once as needed for anaphylaxis 0.3 mL PRN    fluticasone (FLONASE) 50 MCG/ACT nasal spray SHAKE LIQUID AND USE 2 SPRAYS IN EACH NOSTRIL DAILY 16 g 0    Magnesium Hydroxide (MAGNESIA PO) Take 500 mg by mouth      metFORMIN (GLUCOPHAGE XR) 500 MG 24 hr tablet TAKE 2 TABLETS BY MOUTH EVERY DAY WITH THE EVENING MEAL (Patient taking differently: Take 1,000 mg by mouth daily (with dinner). TAKE 2 TABLETS BY MOUTH EVERY DAY WITH THE EVENING MEAL) 180 tablet 3    Multiple Vitamins-Minerals (MULTIVITAMIN ADULT PO) Take 1 tablet by mouth daily      nitroFURantoin macrocrystal-monohydrate (MACROBID) 100 MG capsule TAKE 1 CAPSULE BY MOUTH AFTER INTERCOURSE AS NEEDED (Patient taking differently: as needed. TAKE 1 CAPSULE BY MOUTH AFTER INTERCOURSE AS NEEDED) 30 capsule PRN    phenazopyridine (PYRIDIUM) 200 MG tablet Take 1 tablet (200 mg) by mouth 3 times daily as needed for irritation 9 tablet 0    Probiotic Product (PRO-BIOTIC BLEND PO) Take 1 capsule by mouth daily Enzymatic Therapy-probiotic pearls      psyllium (METAMUCIL/KONSYL) 58.6 % powder Take by mouth daily.      tamoxifen (NOLVADEX) 10 MG tablet TAKE 1/2 TABLET BY MOUTH ONCE DAILY. CUT EACH TABLET WITH PILL CUTTER TO ENSURE 5MG DAILY DOSE. 45 tablet 1    amphetamine-dextroamphetamine (ADDERALL XR) 20 MG 24 hr capsule Take 20 mg by mouth daily (Patient not taking: Reported on 10/29/2024)      benzonatate (TESSALON) 200 MG capsule Take 1 capsule (200 mg) by mouth 3 times daily as needed for cough (Patient not taking: Reported on 10/29/2024) 30 capsule 0    escitalopram (LEXAPRO) 20 MG tablet Take 1 tablet (20 mg) by mouth daily (Patient not taking: Reported on 10/29/2024) 90 tablet 0        Family History:  Family History   Problem Relation Age of Onset    C.A.D. Mother      Hypertension Mother     Breast Cancer Mother     Psychotic Disorder Mother     Colon Cancer Mother     Cancer Mother         Bone cancer    Coronary Artery Disease Mother     Hyperlipidemia Mother         treated w. statins    Other Cancer Mother         bone/marrow, ,  of this    Depression Mother     Anxiety Disorder Mother     Mental Illness Mother         various undiagnosed types, but THERE    Obesity Mother     Bone Cancer Mother     Other - See Comments Mother         psychotic disease     C.A.D. Father     Diabetes Father     Hypertension Father     Cerebrovascular Disease Father         1984 or     Psychotic Disorder Father     Pancreatic Cancer Father     Coronary Artery Disease Father     Hyperlipidemia Father         treated w statins    Other Cancer Father         pancreatic, ,  of this    Depression Father     Mental Illness Father         likely PTSD and depression    Obesity Father     Other - See Comments Father         cerebrovascular disease, psychotic disease     Psychotic Disorder Sister         x2    Neurologic Disorder Sister     Hypertension Sister     Hyperlipidemia Sister         treated w statins    Depression Sister     Anxiety Disorder Sister     Obesity Sister     Rashes/Skin Problems Sister         precancerous lesion on leg    Psychotic Disorder Sister     Other - See Comments Sister         small kidney stone     Hypertension Sister     Hyperlipidemia Sister         treated w statins    Depression Sister     Anxiety Disorder Sister     Other - See Comments Sister         psychotic disease     Prostate Problems Brother         cleared     Hypertension Brother     Hyperlipidemia Brother         unsure if treated w statins    Depression Brother     Anxiety Disorder Brother     Mental Illness Brother         undiagnosed but THERE    Obesity Brother     Pacemaker Brother 70        bradycardia, sick sinus syndrome    Psychotic Disorder Brother         x2     Depression Brother         major depressive disorder and OCD    Anxiety Disorder Brother     Mental Illness Brother         not sure of diagnoses, treated    Hypertension Brother     Hyperlipidemia Brother     Prostate Cancer Brother     Depression Brother     Anxiety Disorder Brother     Other - See Comments Brother         psychotic disease     Psychotic Disorder Maternal Grandmother         ?    Coronary Artery Disease Maternal Grandmother          of heart attack age 84?    Depression Maternal Grandmother     Psychotic Disorder Maternal Grandfather         ?    Psychotic Disorder Paternal Grandmother         Schizophernia    Coronary Artery Disease Paternal Grandmother          of heart attack, age 85?    Depression Paternal Grandmother         severe, but recovered    Mental Illness Paternal Grandmother         possible bipolar or other    Schizophrenia Paternal Grandmother     Psychotic Disorder Paternal Grandfather         ?    Respiratory Paternal Grandfather          of emphysema and smoking    Emphysema Paternal Grandfather     Cancer - colorectal No family hx of     Glaucoma No family hx of     Macular Degeneration No family hx of        Social History:  Social History     Socioeconomic History    Marital status: Single     Spouse name: Not on file    Number of children: 0    Years of education: Not on file    Highest education level: Not on file   Occupational History    Occupation: Employment Counselor     Comment: changing positions due to layoff   Tobacco Use    Smoking status: Never     Passive exposure: Never    Smokeless tobacco: Never   Vaping Use    Vaping status: Never Used   Substance and Sexual Activity    Alcohol use: Yes     Comment: Very infrequent, a bit of wine or beer 2x per month maybe    Drug use: Yes     Types: Marijuana     Comment: infrequent, for celebrations only, maybe 8x per year    Sexual activity: Yes     Partners: Male     Birth control/protection: None      "Comment: long-time    Other Topics Concern    Parent/sibling w/ CABG, MI or angioplasty before 65F 55M? No   Social History Narrative    Not on file     Social Drivers of Health     Financial Resource Strain: Not on file   Food Insecurity: Not on file   Transportation Needs: Not on file   Physical Activity: Not on file   Stress: Not on file   Social Connections: Not on file   Interpersonal Safety: Not At Risk (10/8/2021)    Humiliation, Afraid, Rape, and Kick questionnaire     Fear of Current or Ex-Partner: No     Emotionally Abused: No     Physically Abused: No     Sexually Abused: No   Housing Stability: Not on file       Physical Exam:  /77 (BP Location: Right arm, Patient Position: Sitting, Cuff Size: Adult Regular)   Pulse 100   Temp 99.1  F (37.3  C) (Oral)   Resp 12   Ht 1.575 m (5' 2.01\")   Wt 88.4 kg (194 lb 14.4 oz)   LMP 01/01/2009   SpO2 97%   BMI 35.64 kg/m    GENERAL APPEARANCE: healthy, alert and no apparent distress  BREAST: A multipositional, bilateral breast exam was performed. Fairly symmetrical. Nipples everted bilaterally. Right breast: no palpable dominant masses, no nipple discharge, no skin changes. Dense tissue.  Right axilla: no palpable adenopathy. Left breast: no palpable dominant masses, no nipple discharge, no skin changes. Left axilla: no palpable adenopathy. Dense tissue.   LYMPHATICS: No cervical, supraclavicular, or axillary lymphadenopathy  SKIN: no suspicious lesions or rashes on examined skin.    Laboratory/Imaging Studies  Results for orders placed or performed in visit on 10/29/24   MA Screening Bilateral w/ Micheal     Status: Normal    Narrative    BILATERAL FULL FIELD DIGITAL SCREENING MAMMOGRAM WITH TOMOSYNTHESIS    Performed on: 10/29/24    Compared to: 10/24/2023, 10/21/2022, and 10/13/2021    Technique:  This study was evaluated with the assistance of Computer-Aided   Detection.  Breast Tomosynthesis was used in interpretation.    Findings: The " breasts are heterogeneously dense, which may obscure small   masses.  There is no radiographic evidence of malignancy.     Impression    IMPRESSION: ACR BI-RADS Category 1: Negative    BREAST CANCER SCREENING RECOMMENDATION: Routine yearly mammography   beginning at age 40 or as discussed with your provider.    The results and recommendations of this examination will be communicated   to the patient.        Christina Rodriguez MD         ASSESSMENT    Hafsa reports that she is doing well at this visit. She shared that this year has not been without struggle. She was laid off, an has since got a new job. She has no concerns with her breast tissue. We updated her medical history to include a benign tumor removed from her brother's small intestine. She has no new cancers in her family.     We discussed her last screening tests and reviewed results, which have been negative. We discussed concerns and symptoms to be watchful for between visits, including breast lumps, bumps, nipple inversion, nipple discharge and any changes in skin including crinkled or puckered skin. We reviewed the recommendation for breast self awareness.       Individualized Surveillance Plan for women  With 20% or greater lifetime risk of breast cancer   Per NCCN Breast Cancer Screening and Diagnosis Guidelines Version 2.2024   Recommended screening Test or procedure Last done Next Scheduled    Clinical encounter Clinical exam every 6-12 months.   Refer to genetic counseling if not already done.  Consider risk reduction strategies.   October 2024 November, 2025   However, some family histories with breast cancers at a very young age, may warrant screening starting earlier.    *May begin at age 40 if breast cancers in the family occur at later ages.    Annual mammogram beginning 10 years younger than the earliest breast cancer in the family but not prior to age 30.    Recommend annual breast MRI to begin 10 years younger than the earliest breast  cancer in the family but not prior to age 25.    Breast MRIs are preferably done on day 7-15 of the menstrual cycle in premenopausal women.   10/24/2023: Screening tomosynthesis mammogram, BiRads1    4/19/2024: Breast MRI, BiRads1   Mammogram today    Breast MRI in May    Return to clinic in November, 2025 with a mammogram following our visit   Breast screening for patients at high risk due to thoracic radiation between the ages of 10-30   Annual clinical exam beginning 8 years after radiation therapy.    Annual screening mammogram beginning at age 30 or 8 years after radiation therapy    Annual breast MRI, beginning at age 25 or 8 years after radiation therapy.     NA   NA   Women who have a lifetime risk of >20% based on history of LCIS or ADH/ALH Annual screening mammogram beginning at age of LCIS or ADH/ALH but not prior to age 30.    Consider annual MRI to begin at age of diagnosis of LCIS or ADH/ALH but not prior to age 25.    Consider risk reducing strategies.   NA   NA    Recommend risk reducing strategies for women with 1.7% 5 year risk of breast cancer. Stop tamoxifen, completed five years of therapy          I spent a total of 35 minutes on the day of the visit. Please see the note for further information on patient assessment and treatment.     Kim Patel, DONNELL, APRN, AGCNS-BC  Clinical Nurse Specialist  Cancer Risk Management Program  [facility]    Cc:  MARLON Relily-CNP

## 2024-10-29 ENCOUNTER — ONCOLOGY VISIT (OUTPATIENT)
Dept: ONCOLOGY | Facility: CLINIC | Age: 69
End: 2024-10-29
Payer: MEDICARE

## 2024-10-29 ENCOUNTER — ANCILLARY PROCEDURE (OUTPATIENT)
Dept: MAMMOGRAPHY | Facility: CLINIC | Age: 69
End: 2024-10-29
Attending: CLINICAL NURSE SPECIALIST
Payer: MEDICARE

## 2024-10-29 VITALS
OXYGEN SATURATION: 97 % | BODY MASS INDEX: 35.87 KG/M2 | HEIGHT: 62 IN | RESPIRATION RATE: 12 BRPM | DIASTOLIC BLOOD PRESSURE: 77 MMHG | TEMPERATURE: 99.1 F | HEART RATE: 100 BPM | SYSTOLIC BLOOD PRESSURE: 135 MMHG | WEIGHT: 194.9 LBS

## 2024-10-29 DIAGNOSIS — Z12.39 BREAST CANCER SCREENING, HIGH RISK PATIENT: ICD-10-CM

## 2024-10-29 DIAGNOSIS — R92.30 DENSE BREAST: ICD-10-CM

## 2024-10-29 DIAGNOSIS — Z12.31 ENCOUNTER FOR SCREENING MAMMOGRAM FOR MALIGNANT NEOPLASM OF BREAST: ICD-10-CM

## 2024-10-29 DIAGNOSIS — Z80.3 FAMILY HISTORY OF MALIGNANT NEOPLASM OF BREAST: ICD-10-CM

## 2024-10-29 DIAGNOSIS — Z12.39 BREAST CANCER SCREENING, HIGH RISK PATIENT: Primary | ICD-10-CM

## 2024-10-29 PROCEDURE — G0463 HOSPITAL OUTPT CLINIC VISIT: HCPCS

## 2024-10-29 PROCEDURE — 99214 OFFICE O/P EST MOD 30 MIN: CPT

## 2024-10-29 PROCEDURE — 77063 BREAST TOMOSYNTHESIS BI: CPT | Performed by: RADIOLOGY

## 2024-10-29 PROCEDURE — 77067 SCR MAMMO BI INCL CAD: CPT | Performed by: RADIOLOGY

## 2024-10-29 ASSESSMENT — PAIN SCALES - GENERAL: PAINLEVEL_OUTOF10: NO PAIN (0)

## 2024-10-29 NOTE — PATIENT INSTRUCTIONS
Individualized Surveillance Plan for women  With 20% or greater lifetime risk of breast cancer   Per NCCN Breast Cancer Screening and Diagnosis Guidelines Version 2.2024   Recommended screening Test or procedure Last done Next Scheduled    Clinical encounter Clinical exam every 6-12 months.   Refer to genetic counseling if not already done.  Consider risk reduction strategies.   October 2024 November, 2025   However, some family histories with breast cancers at a very young age, may warrant screening starting earlier.    *May begin at age 40 if breast cancers in the family occur at later ages.    Annual mammogram beginning 10 years younger than the earliest breast cancer in the family but not prior to age 30.    Recommend annual breast MRI to begin 10 years younger than the earliest breast cancer in the family but not prior to age 25.    Breast MRIs are preferably done on day 7-15 of the menstrual cycle in premenopausal women.   10/24/2023: Screening tomosynthesis mammogram, BiRads1    4/19/2024: Breast MRI, BiRads1   Mammogram today    Breast MRI in May    Return to clinic in November, 2025 with a mammogram following our visit   Breast screening for patients at high risk due to thoracic radiation between the ages of 10-30   Annual clinical exam beginning 8 years after radiation therapy.    Annual screening mammogram beginning at age 30 or 8 years after radiation therapy    Annual breast MRI, beginning at age 25 or 8 years after radiation therapy.     NA   NA   Women who have a lifetime risk of >20% based on history of LCIS or ADH/ALH Annual screening mammogram beginning at age of LCIS or ADH/ALH but not prior to age 30.    Consider annual MRI to begin at age of diagnosis of LCIS or ADH/ALH but not prior to age 25.    Consider risk reducing strategies.   NA   NA    Recommend risk reducing strategies for women with 1.7% 5 year risk of breast cancer. Stop tamoxifen, completed five years of therapy

## 2024-10-29 NOTE — LETTER
10/29/2024      Hafsa Corona  [address]  [place] [address]      Dear Colleague,    Thank you for referring your patient, Hafsa Corona, to the [facility]. Please see a copy of my visit note below.    Oncology Risk Management Consultation:  Date on this visit: 10/29/2024    Hafsa Corona requires heightened screening and surveillance for her higher risk of breast cancer, secondary to a family history of breast cancer in her mother at age 63. 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 She is considered to at high risk for breast cancer and has a 30.3% lifetime risk for breast cancer by the SHERON model. She has a 10.8% 5 year risk by the SHERON model.      Primary Physician:  MARLON Reilly-CNP     History Of Present Illness:  Ms. Corona is a very pleasant 69 year old female who presents with a family history of breast cancer.      Genetic testing: NEGATIVE  12/8/2017 - Negative for mutations in the AIP, ALK, APC, COSTA, BAP1, BARD1, BLM, BRCA1, BRCA2, BRIP1, BMPR1A, CDH1, CDK4, CDKN1B, CDKN2A, CHEK2, DICER1, EPCAM, FANCC, FH, FLCN, GALNT12, GREM1, HOXB13, MAX, MEN1, MET, MITF, MLH1, MRE11A, MSH2, MSH6, MUTYH, NBN, NF1, NF2, PALB2, PHOX2B, PMS2, POLD1, POLE, POT1, XRXSP0T, PTCH1, PTEN, RAD50, RAD51C, RAD51D, RB1, RET, SDHA, SDHAF2, SDHB, SDHC, SDHD, SMAD4, SMARCA4, SMARCB1, SMARCE1, STK11, SUFU, HLNO872, TP53, TSC1, TSC2, VHL, and XRCC2 genes using a Cancer-Next Expanded panel through [employer].     Pertinent history:  2007- R salpingo oophorectomy for abnormal imaging (enlarged ovary), biopsy showed endometriosis  7/7/2017 - Stage 1C2 Clear cell carcinoma of the L ovary, s/p HELENE/LSO and chemotherapy.19 cm cystic mass  1/24/18: R nephrectomy with Dr. 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   Nulliparous.  Menarche at 10.  Menopause: age 54  Hx of OCPs x6 years.  No hx of HRT.  Density:  heterogeneously dense  No history of breast biopsy.  Hx of breast cyst in 1987, aspirated   No history of hyperplasia, atypia, or malignancy  Completed five years of 5 mg daily low dose tamoxifen. She took this from October, 2019-October 2024.      Pertinent screening history:  6/2011 - Screening mammogram, normal by report   2/1/2014 - Screening mammogram, normal by report  9/15/2017 - Screening mammogram, BiRads1.    4/5/2018 - Breast MRI, BiRads2.  9/21/2018: Screening tomosynthesis mammogram, BI-RADS 1.  4/9/2019: Breast MRI, BI-RADS 1.  9/27/2019- Screening tomosynthesis mammogram, BiRads1  10/1/2020- Screening mammogram, BiRads1  3/29/2021- Breast MRI, BiRads1  10/13/2021- Screening tomosynthesis mammogram, BiRads1  4/6/2022- Breast MRI, BiRads1  10/21/2022-screening tomosynthesis mammogram, BI-RADS 0 for Possible developing focal asymmetries 6:30 anteriorly on the left. : BI-RADS CATEGORY: 0  10/27/2022: Left diagnostic mammogram and left ultrasound, both BI-RADS 1  4/13/2023: Breast MRI, BI-RADS 1  10/24/2023: Screening tomosynthesis mammogram, BiRads1  4/19/2024: Breast MRI, BiRads1       At this visit, she denies asymmetry, lumps, masses, thickening, pain, nipple discharge and skin changes    Past Medical/Surgical History:  Past Medical History:   Diagnosis Date     Anxiety state, unspecified     [id number]     Arthritis 2014    vague, gradual, not treated really, knees feel it     At high risk for breast cancer 09/27/2019    30.3% lifetime risk by SHERON model     Attention deficit disorder without mention of hyperactivity      Cancer (H) 7/2017 and 1/2018    ovarian and kidney cancers, both treated well     Chronic rhinitis      Depressive disorder lifetime    plus Anxiety plus ADD. Escitalipram, therapy, ADD meds maybe     Heart disease 1999    tachycardia and high cholesterol, managed     History of blood transfusion 07/2017    while in hospital for ovarian cancer     Hypertension 1999    tho BP was too  LOW last week. past 12 years Generally OK     Panic disorder without agoraphobia      Pure hypercholesterolemia     Lipitor     Tachycardia, unspecified     9/1999-2/2004     Type II or unspecified type diabetes mellitus without mention of complication, not stated as uncontrolled     diagnosed 2004     Past Surgical History:   Procedure Laterality Date     ABSCESS DRAINAGE Left     left leg      AS REMV KIDNEY,RADICAL Right      BIOPSY  7/2017 and 2/2018    ovarian and kidney cancer biopsies. also 1987 breast benign     BREAST CYST EXCISION  1985     BREAST SURGERY  1987    date unsure. benign breast cyst removed     CATARACT IOL, RT/LT Left 05/18/2018     CATARACT IOL, RT/LT Right 07/1318     COLONOSCOPY  2008 and 2013    polyps removed. Next due fall 2018     ENDOMETRIAL ABLATION  2008     EXCISE MASS BACK N/A 12/6/2021    Procedure: EXCISION, MASS, BACK;  Surgeon: Norris Vela MD;  Location: [facility] OR     HYSTERECTOMY TOTAL ABDOMINAL, BILATERAL SALPINGO-OOPHORECTOMY, NODE DISSECTION, COMBINED Left 7/7/2017    Procedure: COMBINED HYSTERECTOMY TOTAL ABDOMINAL, SALPINGO-OOPHORECTOMY, NODE DISSECTION;  Exploratory Laparotomy, Left Salpingo-Oophorectomy, Cancer Staging, Total Hysterectomy, Omentectomy, Evacuation of abdominal fluid, Lymph Node Dissection  Anesthesia Block ;  Surgeon: Serena Cole MD;  [facility]     HYSTERECTOMY, PAP NO LONGER INDICATED  07/07/2017    Laparotomy; for ovarian cancer staging     HYSTERECTOMY, PAP NO LONGER INDICATED       INSERT PORT VASCULAR ACCESS Right 8/21/2017    Procedure: INSERT PORT VASCULAR ACCESS;  Single Lumen Chest Power Port;  Surgeon: Stephen Mike PA-C;  [facility]     IR PORT REMOVAL RIGHT  11/12/2021     LYMPHADENECTOMY RETROPERITONEAL Bilateral 07/07/2017    Laparotomy; pelvics & para-aortics; for ovarian cancer staging     OMENTECTOMY  07/07/2017    Laparotomy; for ovarian cancer staging     ORTHOPEDIC SURGERY  1/2002    broken left  jaw due to fall, 4 fractures, titanium plates     OTHER SURGICAL HISTORY  01/2002    OTHER SURGICAL HISTORYfacial surgery due to fall      PHACOEMULSIFICATION CLEAR CORNEA WITH STANDARD INTRAOCULAR LENS IMPLANT Right 7/13/2018    Procedure: PHACOEMULSIFICATION CLEAR CORNEA WITH STANDARD INTRAOCULAR LENS IMPLANT;  RIght Eye Phacoemulsification Clear Cornea with Standard Intraocular Lens Placement ;  Surgeon: Mary Jo Massey MD;  [facility]     PHACOEMULSIFICATION CLEAR CORNEA WITH TORIC INTRAOCULAR LENS IMPLANT Left 5/18/2018    Procedure: PHACOEMULSIFICATION CLEAR CORNEA WITH TORIC INTRAOCULAR LENS IMPLANT;  Left Eye Phacoemulsification with Toric Lens;  Surgeon: Mary Jo Massey MD;  [facility]     REMOVE PORT VASCULAR ACCESS Right 11/12/2021    Procedure: REMOVAL, VASCULAR ACCESS PORT right;  Surgeon: Afsaneh Das PA-C;  Location: [facility] OR     SALPINGO OOPHORECTOMY,R/L/KAYY Right 2008    Salpingo Oophorectomy, RT     SALPINGO OOPHORECTOMY,R/L/KAYY Left 07/07/2017    Laparotomy; for ovarian cancer staging     SALPINGOOPHORECTOMY  2008     SURGICAL HISTORY OF -       facial surgery d/t fall in 1/2002     SURGICAL HISTORY OF -       1985 removal of breast cyst     SURGICAL HISTORY OF -   2008    endometrial ablation     YAG CAPSULOTOMY OD (RIGHT EYE)  10/22/2018       Allergies:  Allergies as of 10/29/2024 - Reviewed 10/29/2024   Allergen Reaction Noted     Bee venom Anaphylaxis and Swelling 05/10/2018     Paclitaxel Anaphylaxis and Difficulty breathing 08/30/2017     Wasps [hornets] Anaphylaxis 09/03/2009     Yellow hornet venom [hornet venom] Anaphylaxis and Swelling 05/10/2018     Yellow jacket venom [honey bee venom] Anaphylaxis and Swelling 05/10/2018     Sulfa antibiotics Hives and Rash 07/19/2005       Current Medications:  Current Outpatient Medications   Medication Sig Dispense Refill     Acetaminophen (TYLENOL PO) Take 500 mg by mouth as needed for mild pain. 2 tabs prn pain (monthly)        atorvastatin (LIPITOR) 80 MG tablet TAKE 1 TABLET(80 MG) BY MOUTH DAILY 90 tablet 0     B Complex Vitamins (VITAMIN B-COMPLEX PO) Take 50 mg by mouth        CALCIUM-MAGNESIUM-VITAMIN D PO Take 2 tablets by mouth daily       Cholecalciferol (VITAMIN D) 1000 UNIT capsule Take 2,000 Units by mouth daily        clopidogrel (PLAVIX) 75 MG tablet Take 1 tablet (75 mg) by mouth daily 90 tablet 3     EPINEPHrine (ANY BX GENERIC EQUIV) 0.3 MG/0.3ML injection 2-pack Inject 0.3 mLs (0.3 mg) into the muscle once as needed for anaphylaxis 0.3 mL PRN     fluticasone (FLONASE) 50 MCG/ACT nasal spray SHAKE LIQUID AND USE 2 SPRAYS IN EACH NOSTRIL DAILY 16 g 0     Magnesium Hydroxide (MAGNESIA PO) Take 500 mg by mouth       metFORMIN (GLUCOPHAGE XR) 500 MG 24 hr tablet TAKE 2 TABLETS BY MOUTH EVERY DAY WITH THE EVENING MEAL (Patient taking differently: Take 1,000 mg by mouth daily (with dinner). TAKE 2 TABLETS BY MOUTH EVERY DAY WITH THE EVENING MEAL) 180 tablet 3     Multiple Vitamins-Minerals (MULTIVITAMIN ADULT PO) Take 1 tablet by mouth daily       nitroFURantoin macrocrystal-monohydrate (MACROBID) 100 MG capsule TAKE 1 CAPSULE BY MOUTH AFTER INTERCOURSE AS NEEDED (Patient taking differently: as needed. TAKE 1 CAPSULE BY MOUTH AFTER INTERCOURSE AS NEEDED) 30 capsule PRN     phenazopyridine (PYRIDIUM) 200 MG tablet Take 1 tablet (200 mg) by mouth 3 times daily as needed for irritation 9 tablet 0     Probiotic Product (PRO-BIOTIC BLEND PO) Take 1 capsule by mouth daily Enzymatic Therapy-probiotic pearls       psyllium (METAMUCIL/KONSYL) 58.6 % powder Take by mouth daily.       tamoxifen (NOLVADEX) 10 MG tablet TAKE 1/2 TABLET BY MOUTH ONCE DAILY. CUT EACH TABLET WITH PILL CUTTER TO ENSURE 5MG DAILY DOSE. 45 tablet 1     amphetamine-dextroamphetamine (ADDERALL XR) 20 MG 24 hr capsule Take 20 mg by mouth daily (Patient not taking: Reported on 10/29/2024)       benzonatate (TESSALON) 200 MG capsule Take 1 capsule (200 mg) by mouth 3  times daily as needed for cough (Patient not taking: Reported on 10/29/2024) 30 capsule 0     escitalopram (LEXAPRO) 20 MG tablet Take 1 tablet (20 mg) by mouth daily (Patient not taking: Reported on 10/29/2024) 90 tablet 0        Family History:  Family History   Problem Relation Age of Onset     C.A.D. Mother      Hypertension Mother      Breast Cancer Mother      Psychotic Disorder Mother      Colon Cancer Mother      Cancer Mother         Bone cancer     Coronary Artery Disease Mother      Hyperlipidemia Mother         treated w. statins     Other Cancer Mother         bone/marrow, ,  of this     Depression Mother      Anxiety Disorder Mother      Mental Illness Mother         various undiagnosed types, but THERE     Obesity Mother      Bone Cancer Mother      Other - See Comments Mother         psychotic disease      C.A.D. Father      Diabetes Father      Hypertension Father      Cerebrovascular Disease Father         1984 or      Psychotic Disorder Father      Pancreatic Cancer Father      Coronary Artery Disease Father      Hyperlipidemia Father         treated w statins     Other Cancer Father         pancreatic, ,  of this     Depression Father      Mental Illness Father         likely PTSD and depression     Obesity Father      Other - See Comments Father         cerebrovascular disease, psychotic disease      Psychotic Disorder Sister         x2     Neurologic Disorder Sister      Hypertension Sister      Hyperlipidemia Sister         treated w statins     Depression Sister      Anxiety Disorder Sister      Obesity Sister      Rashes/Skin Problems Sister         precancerous lesion on leg     Psychotic Disorder Sister      Other - See Comments Sister         small kidney stone      Hypertension Sister      Hyperlipidemia Sister         treated w statins     Depression Sister      Anxiety Disorder Sister      Other - See Comments Sister         psychotic disease      Prostate  Problems Brother         cleared      Hypertension Brother      Hyperlipidemia Brother         unsure if treated w statins     Depression Brother      Anxiety Disorder Brother      Mental Illness Brother         undiagnosed but THERE     Obesity Brother      Pacemaker Brother 70        bradycardia, sick sinus syndrome     Psychotic Disorder Brother         x2     Depression Brother         major depressive disorder and OCD     Anxiety Disorder Brother      Mental Illness Brother         not sure of diagnoses, treated     Hypertension Brother      Hyperlipidemia Brother      Prostate Cancer Brother      Depression Brother      Anxiety Disorder Brother      Other - See Comments Brother         psychotic disease      Psychotic Disorder Maternal Grandmother         ?     Coronary Artery Disease Maternal Grandmother          of heart attack age 84?     Depression Maternal Grandmother      Psychotic Disorder Maternal Grandfather         ?     Psychotic Disorder Paternal Grandmother         Schizophernia     Coronary Artery Disease Paternal Grandmother          of heart attack, age 85?     Depression Paternal Grandmother         severe, but recovered     Mental Illness Paternal Grandmother         possible bipolar or other     Schizophrenia Paternal Grandmother      Psychotic Disorder Paternal Grandfather         ?     Respiratory Paternal Grandfather          of emphysema and smoking     Emphysema Paternal Grandfather      Cancer - colorectal No family hx of      Glaucoma No family hx of      Macular Degeneration No family hx of        Social History:  Social History     Socioeconomic History     Marital status: Single     Spouse name: Not on file     Number of children: 0     Years of education: Not on file     Highest education level: Not on file   Occupational History     Occupation: Employment Counselor     Comment: changing positions due to layoff   Tobacco Use     Smoking status: Never     Passive  "exposure: Never     Smokeless tobacco: Never   Vaping Use     Vaping status: Never Used   Substance and Sexual Activity     Alcohol use: Yes     Comment: Very infrequent, a bit of wine or beer 2x per month maybe     Drug use: Yes     Types: Marijuana     Comment: infrequent, for celebrations only, maybe 8x per year     Sexual activity: Yes     Partners: Male     Birth control/protection: None     Comment: long-time    Other Topics Concern     Parent/sibling w/ CABG, MI or angioplasty before 65F 55M? No   Social History Narrative     Not on file     Social Drivers of Health     Financial Resource Strain: Not on file   Food Insecurity: Not on file   Transportation Needs: Not on file   Physical Activity: Not on file   Stress: Not on file   Social Connections: Not on file   Interpersonal Safety: Not At Risk (10/8/2021)    Humiliation, Afraid, Rape, and Kick questionnaire      Fear of Current or Ex-Partner: No      Emotionally Abused: No      Physically Abused: No      Sexually Abused: No   Housing Stability: Not on file       Physical Exam:  /77 (BP Location: Right arm, Patient Position: Sitting, Cuff Size: Adult Regular)   Pulse 100   Temp 99.1  F (37.3  C) (Oral)   Resp 12   Ht 1.575 m (5' 2.01\")   Wt 88.4 kg (194 lb 14.4 oz)   LMP 01/01/2009   SpO2 97%   BMI 35.64 kg/m    GENERAL APPEARANCE: healthy, alert and no apparent distress  BREAST: A multipositional, bilateral breast exam was performed. Fairly symmetrical. Nipples everted bilaterally. Right breast: no palpable dominant masses, no nipple discharge, no skin changes. Dense tissue.  Right axilla: no palpable adenopathy. Left breast: no palpable dominant masses, no nipple discharge, no skin changes. Left axilla: no palpable adenopathy. Dense tissue.   LYMPHATICS: No cervical, supraclavicular, or axillary lymphadenopathy  SKIN: no suspicious lesions or rashes on examined skin.    Laboratory/Imaging Studies  Results for orders placed or " performed in visit on 10/29/24   MA Screening Bilateral w/ Micheal     Status: Normal    Narrative    BILATERAL FULL FIELD DIGITAL SCREENING MAMMOGRAM WITH TOMOSYNTHESIS    Performed on: 10/29/24    Compared to: 10/24/2023, 10/21/2022, and 10/13/2021    Technique:  This study was evaluated with the assistance of Computer-Aided   Detection.  Breast Tomosynthesis was used in interpretation.    Findings: The breasts are heterogeneously dense, which may obscure small   masses.  There is no radiographic evidence of malignancy.     Impression    IMPRESSION: ACR BI-RADS Category 1: Negative    BREAST CANCER SCREENING RECOMMENDATION: Routine yearly mammography   beginning at age 40 or as discussed with your provider.    The results and recommendations of this examination will be communicated   to the patient.        Christina Rodriguez MD         JOSÉ LUIS Pereyra reports that she is doing well at this visit. She shared that this year has not been without struggle. She was laid off, an has since got a new job. She has no concerns with her breast tissue. We updated her medical history to include a benign tumor removed from her brother's small intestine. She has no new cancers in her family.     We discussed her last screening tests and reviewed results, which have been negative. We discussed concerns and symptoms to be watchful for between visits, including breast lumps, bumps, nipple inversion, nipple discharge and any changes in skin including crinkled or puckered skin. We reviewed the recommendation for breast self awareness.       Individualized Surveillance Plan for women  With 20% or greater lifetime risk of breast cancer   Per NCCN Breast Cancer Screening and Diagnosis Guidelines Version 2.2024   Recommended screening Test or procedure Last done Next Scheduled    Clinical encounter Clinical exam every 6-12 months.   Refer to genetic counseling if not already done.  Consider risk reduction strategies.   October 2024    November, 2025   However, some family histories with breast cancers at a very young age, may warrant screening starting earlier.    *May begin at age 40 if breast cancers in the family occur at later ages.    Annual mammogram beginning 10 years younger than the earliest breast cancer in the family but not prior to age 30.    Recommend annual breast MRI to begin 10 years younger than the earliest breast cancer in the family but not prior to age 25.    Breast MRIs are preferably done on day 7-15 of the menstrual cycle in premenopausal women.   10/24/2023: Screening tomosynthesis mammogram, BiRads1    4/19/2024: Breast MRI, BiRads1   Mammogram today    Breast MRI in May    Return to clinic in November, 2025 with a mammogram following our visit   Breast screening for patients at high risk due to thoracic radiation between the ages of 10-30   Annual clinical exam beginning 8 years after radiation therapy.    Annual screening mammogram beginning at age 30 or 8 years after radiation therapy    Annual breast MRI, beginning at age 25 or 8 years after radiation therapy.     NA   NA   Women who have a lifetime risk of >20% based on history of LCIS or ADH/ALH Annual screening mammogram beginning at age of LCIS or ADH/ALH but not prior to age 30.    Consider annual MRI to begin at age of diagnosis of LCIS or ADH/ALH but not prior to age 25.    Consider risk reducing strategies.   NA   NA    Recommend risk reducing strategies for women with 1.7% 5 year risk of breast cancer. Stop tamoxifen, completed five years of therapy          I spent a total of 35 minutes on the day of the visit. Please see the note for further information on patient assessment and treatment.     Kim Patel, DONNELL, APRN, AGCNS-BC  Clinical Nurse Specialist  Cancer Risk Management Program  [facility]    Cc:  Morelia Ayon, MARLON-CNP            Again, thank you for allowing me to participate in the care of your patient.         Sincerely,        MARLON White CNP

## 2024-10-29 NOTE — NURSING NOTE
"Oncology Rooming Note    October 29, 2024 3:07 PM   Hafsa Corona is a 69 year old female who presents for:    Chief Complaint   Patient presents with    Oncology Clinic Visit     RTN CCSL      Initial Vitals: /77 (BP Location: Right arm, Patient Position: Sitting, Cuff Size: Adult Regular)   Pulse 100   Temp 99.1  F (37.3  C) (Oral)   Resp 12   Ht 1.575 m (5' 2.01\")   Wt 88.4 kg (194 lb 14.4 oz)   LMP 01/01/2009   SpO2 97%   BMI 35.64 kg/m   Estimated body mass index is 35.64 kg/m  as calculated from the following:    Height as of this encounter: 1.575 m (5' 2.01\").    Weight as of this encounter: 88.4 kg (194 lb 14.4 oz). Body surface area is 1.97 meters squared.  No Pain (0) Comment: Data Unavailable   Patient's last menstrual period was 01/01/2009.  Allergies reviewed: Yes  Medications reviewed: Yes    Medications: Medication refills not needed today.  Pharmacy name entered into EPIC:    [id number] - [place], [place] - [address] AT [facility] [address]  [id number] [place], [place] - [facility] AT [facility] [address]  [facility] [place], [place] - [address] [facility] [phone]  [facility] [place], [place] - [address]  [id number] - [place], [place] - [address] AT [facility] [address]  [facility] [address] [place] [address] AVE [id number] - [place], [place] - [facility] [facility] [place], [place] - [facility] [place]    Frailty Screening:   Is the patient here for a new oncology consult visit in cancer care? 2. No      Clinical concerns: none      Pedro Alejandre             "

## 2024-11-21 DIAGNOSIS — Z90.5 SOLITARY KIDNEY, ACQUIRED: Primary | ICD-10-CM

## 2025-01-09 ENCOUNTER — TELEPHONE (OUTPATIENT)
Dept: HEMATOLOGY | Facility: CLINIC | Age: 70
End: 2025-01-09
Payer: COMMERCIAL

## 2025-01-14 ENCOUNTER — PRE VISIT (OUTPATIENT)
Dept: UROLOGY | Facility: CLINIC | Age: 70
End: 2025-01-14
Payer: COMMERCIAL

## 2025-01-14 NOTE — TELEPHONE ENCOUNTER
Reason for visit: follow-up     Relevant information: BETTY completed as of 11/19/2024. Renal panel has yet to be completed. Pt was messaged about it on 11/21/2024.    Records/imaging/labs/orders: all records available    Lynette Aly  1/14/2025  9:07 AM

## 2025-01-29 ENCOUNTER — OFFICE VISIT (OUTPATIENT)
Dept: URGENT CARE | Facility: URGENT CARE | Age: 70
End: 2025-01-29
Payer: MEDICARE

## 2025-01-29 VITALS
HEIGHT: 62 IN | TEMPERATURE: 97.9 F | RESPIRATION RATE: 15 BRPM | OXYGEN SATURATION: 96 % | HEART RATE: 85 BPM | BODY MASS INDEX: 35.88 KG/M2 | WEIGHT: 195 LBS

## 2025-01-29 DIAGNOSIS — R30.0 DYSURIA: ICD-10-CM

## 2025-01-29 DIAGNOSIS — Z90.5 SINGLE KIDNEY: ICD-10-CM

## 2025-01-29 DIAGNOSIS — N30.90 RECURRENT CYSTITIS: ICD-10-CM

## 2025-01-29 DIAGNOSIS — N30.01 ACUTE CYSTITIS WITH HEMATURIA: Primary | ICD-10-CM

## 2025-01-29 LAB
BACTERIA #/AREA URNS HPF: ABNORMAL /HPF
RBC #/AREA URNS AUTO: ABNORMAL /HPF
SQUAMOUS #/AREA URNS AUTO: ABNORMAL /LPF
WBC #/AREA URNS AUTO: ABNORMAL /HPF

## 2025-01-29 PROCEDURE — 99213 OFFICE O/P EST LOW 20 MIN: CPT | Performed by: INTERNAL MEDICINE

## 2025-01-29 PROCEDURE — 81015 MICROSCOPIC EXAM OF URINE: CPT | Performed by: INTERNAL MEDICINE

## 2025-01-29 PROCEDURE — 87086 URINE CULTURE/COLONY COUNT: CPT | Performed by: INTERNAL MEDICINE

## 2025-01-29 RX ORDER — OMEGA-3/DHA/EPA/FISH OIL 60 MG-90MG
CAPSULE ORAL
COMMUNITY

## 2025-01-29 RX ORDER — CIPROFLOXACIN 250 MG/1
250 TABLET, FILM COATED ORAL 2 TIMES DAILY
Qty: 14 TABLET | Refills: 0 | Status: SHIPPED | OUTPATIENT
Start: 2025-01-29 | End: 2025-02-05

## 2025-01-29 NOTE — PATIENT INSTRUCTIONS
Urine test consistent with bladder infection    urine culture may be effected by Macrobid doses     If symptoms improve with antibiotics, then finish antibiotics given to you this visit  Discussed cipro as choice of antibiotics     Has appointment with Urology in 2 weeks.

## 2025-01-29 NOTE — PROGRESS NOTES
ASSESSMENT AND PLAN:      ICD-10-CM    1. Acute cystitis with hematuria  N30.01 ciprofloxacin (CIPRO) 250 MG tablet      2. Dysuria  R30.0 UA Microscopic with Reflex to Culture     Urine Culture     CANCELED: UA Macroscopic with reflex to Microscopic and Culture - Clinic Collect      3. Single kidney  Z90.5       4. Recurrent cystitis  N30.90 ciprofloxacin (CIPRO) 250 MG tablet          Last urine cultures negative     Allergy to septra  Takes Macrobid    Understands hold cipro if tendon ache.        Patient Instructions             Urine test consistent with bladder infection    urine culture may be effected by Macrobid doses     If symptoms improve with antibiotics, then finish antibiotics given to you this visit  Discussed cipro as choice of antibiotics     Has appointment with Urology in 2 weeks.            Sada Trevino MD  [facility]    Subjective     Hafsa Corona is a 69 year old who presents for Patient presents with:  Urgent Care: Concern of UTI or cystitis, bleeding and clotting blood out of urethra since this morning.     an established patient of [facility].    UTI  Here today of concern of bladder infection.  Noticed bleeding and clotting blood out of urethra today.  Current and associated symptoms dysuria, frequency, urgency, and blood in urine  Treatment and measures tried azo, 2 doses macrobid  Predisposing factors include history of frequent UTI's  Patient denies flank pain, temperature > 101 degrees F., and vomiting        Review of Systems    Sexually active, takes macrobid with intercourse.  Last time 10-14 days ago    Torie NARAYAN  Has one functioning kidney.          Past Surgical History:   Procedure Laterality Date    ABSCESS DRAINAGE Left     left leg     AS REMV KIDNEY,RADICAL Right     BIOPSY  7/2017 and 2/2018    ovarian and kidney cancer biopsies. also 1987 breast benign    BREAST CYST EXCISION  1985    BREAST SURGERY  1987    date unsure. benign breast cyst removed     CATARACT IOL, RT/LT Left 05/18/2018    CATARACT IOL, RT/LT Right 07/1318    COLONOSCOPY  2008 and 2013    polyps removed. Next due fall 2018    ENDOMETRIAL ABLATION  2008    EXCISE MASS BACK N/A 12/6/2021    Procedure: EXCISION, MASS, BACK;  Surgeon: Norris Vela MD;  Location: [facility] OR    HYSTERECTOMY TOTAL ABDOMINAL, BILATERAL SALPINGO-OOPHORECTOMY, NODE DISSECTION, COMBINED Left 7/7/2017    Procedure: COMBINED HYSTERECTOMY TOTAL ABDOMINAL, SALPINGO-OOPHORECTOMY, NODE DISSECTION;  Exploratory Laparotomy, Left Salpingo-Oophorectomy, Cancer Staging, Total Hysterectomy, Omentectomy, Evacuation of abdominal fluid, Lymph Node Dissection  Anesthesia Block ;  Surgeon: Serena Cole MD;  [facility]    HYSTERECTOMY, PAP NO LONGER INDICATED  07/07/2017    Laparotomy; for ovarian cancer staging    HYSTERECTOMY, PAP NO LONGER INDICATED      INSERT PORT VASCULAR ACCESS Right 8/21/2017    Procedure: INSERT PORT VASCULAR ACCESS;  Single Lumen Chest Power Port;  Surgeon: Stephen Mike PA-C;  [facility]    IR PORT REMOVAL RIGHT  11/12/2021    LYMPHADENECTOMY RETROPERITONEAL Bilateral 07/07/2017    Laparotomy; pelvics & para-aortics; for ovarian cancer staging    OMENTECTOMY  07/07/2017    Laparotomy; for ovarian cancer staging    ORTHOPEDIC SURGERY  1/2002    broken left jaw due to fall, 4 fractures, titanium plates    OTHER SURGICAL HISTORY  01/2002    OTHER SURGICAL HISTORYfacial surgery due to fall     PHACOEMULSIFICATION CLEAR CORNEA WITH STANDARD INTRAOCULAR LENS IMPLANT Right 7/13/2018    Procedure: PHACOEMULSIFICATION CLEAR CORNEA WITH STANDARD INTRAOCULAR LENS IMPLANT;  RIght Eye Phacoemulsification Clear Cornea with Standard Intraocular Lens Placement ;  Surgeon: Mary Jo Massey MD;  [facility]    PHACOEMULSIFICATION CLEAR CORNEA WITH TORIC INTRAOCULAR LENS IMPLANT Left 5/18/2018    Procedure: PHACOEMULSIFICATION CLEAR CORNEA WITH TORIC INTRAOCULAR LENS IMPLANT;  Left Eye  "Phacoemulsification with Toric Lens;  Surgeon: Mary Jo Massey MD;  [facility]    REMOVE PORT VASCULAR ACCESS Right 11/12/2021    Procedure: REMOVAL, VASCULAR ACCESS PORT right;  Surgeon: Afsaneh Das PA-C;  Location: [facility] OR    SALPINGO OOPHORECTOMY,R/L/KAYY Right 2008    Salpingo Oophorectomy, RT    SALPINGO OOPHORECTOMY,R/L/KAYY Left 07/07/2017    Laparotomy; for ovarian cancer staging    SALPINGOOPHORECTOMY  2008    SURGICAL HISTORY OF -       facial surgery d/t fall in 1/2002    SURGICAL HISTORY OF -       1985 removal of breast cyst    SURGICAL HISTORY OF -   2008    endometrial ablation    YAG CAPSULOTOMY OD (RIGHT EYE)  10/22/2018         Objective    Pulse 85   Temp 97.9  F (36.6  C) (Temporal)   Resp 15   Ht 1.575 m (5' 2\")   Wt 88.5 kg (195 lb)   LMP 01/01/2009   SpO2 96%   BMI 35.67 kg/m    Physical Exam  Vitals reviewed.   Constitutional:       Appearance: Normal appearance.   Abdominal:      Palpations: Abdomen is soft.      Tenderness: There is no abdominal tenderness. There is no left CVA tenderness.   Neurological:      Mental Status: She is alert.            Results for orders placed or performed in visit on 01/29/25 (from the past 24 hours)   UA Microscopic with Reflex to Culture   Result Value Ref Range    Bacteria Urine Moderate (A) None Seen /HPF    RBC Urine  (A) 0-2 /HPF /HPF    WBC Urine 25-50 (A) 0-5 /HPF /HPF    Squamous Epithelials Urine Few (A) None Seen /LPF    Narrative    Microscopic exam performed on unspun urine.      *Note: Due to a large number of results and/or encounters for the requested time period, some results have not been displayed. A complete set of results can be found in Results Review.         "

## 2025-01-30 LAB — BACTERIA UR CULT: NORMAL

## 2025-02-05 ENCOUNTER — TELEPHONE (OUTPATIENT)
Dept: UROLOGY | Facility: CLINIC | Age: 70
End: 2025-02-05
Payer: COMMERCIAL

## 2025-02-05 NOTE — TELEPHONE ENCOUNTER
Writer called pt. Pt didn't answer the phone and their mailbox is full. Writer will try again at a later date.    - Lynette Aly (EMT)    9:46 am    2/5/2025

## 2025-02-06 ENCOUNTER — LAB (OUTPATIENT)
Dept: LAB | Facility: CLINIC | Age: 70
End: 2025-02-06
Payer: COMMERCIAL

## 2025-02-06 DIAGNOSIS — N39.0 RECURRENT UTI: ICD-10-CM

## 2025-02-06 DIAGNOSIS — N39.0 RECURRENT UTI: Primary | ICD-10-CM

## 2025-02-06 DIAGNOSIS — Z90.5 SOLITARY KIDNEY, ACQUIRED: ICD-10-CM

## 2025-02-06 LAB
ALBUMIN SERPL BCG-MCNC: 4.2 G/DL (ref 3.5–5.2)
ALBUMIN UR-MCNC: NEGATIVE MG/DL
ANION GAP SERPL CALCULATED.3IONS-SCNC: 14 MMOL/L (ref 7–15)
APPEARANCE UR: CLEAR
BILIRUB UR QL STRIP: NEGATIVE
BUN SERPL-MCNC: 22.8 MG/DL (ref 8–23)
CALCIUM SERPL-MCNC: 9.6 MG/DL (ref 8.8–10.4)
CHLORIDE SERPL-SCNC: 104 MMOL/L (ref 98–107)
COLOR UR AUTO: ABNORMAL
CREAT SERPL-MCNC: 1.05 MG/DL (ref 0.51–0.95)
EGFRCR SERPLBLD CKD-EPI 2021: 57 ML/MIN/1.73M2
GLUCOSE SERPL-MCNC: 149 MG/DL (ref 70–99)
GLUCOSE UR STRIP-MCNC: NEGATIVE MG/DL
HCO3 SERPL-SCNC: 23 MMOL/L (ref 22–29)
HGB UR QL STRIP: NEGATIVE
KETONES UR STRIP-MCNC: NEGATIVE MG/DL
LEUKOCYTE ESTERASE UR QL STRIP: NEGATIVE
MUCOUS THREADS #/AREA URNS LPF: PRESENT /LPF
NITRATE UR QL: NEGATIVE
PH UR STRIP: 5.5 [PH] (ref 5–7)
PHOSPHATE SERPL-MCNC: 3.5 MG/DL (ref 2.5–4.5)
POTASSIUM SERPL-SCNC: 3.9 MMOL/L (ref 3.4–5.3)
RBC URINE: 2 /HPF
SODIUM SERPL-SCNC: 141 MMOL/L (ref 135–145)
SP GR UR STRIP: 1.03 (ref 1–1.03)
SQUAMOUS EPITHELIAL: 1 /HPF
UROBILINOGEN UR STRIP-MCNC: NORMAL MG/DL
WBC URINE: 1 /HPF

## 2025-02-06 PROCEDURE — 36415 COLL VENOUS BLD VENIPUNCTURE: CPT | Performed by: PATHOLOGY

## 2025-02-06 PROCEDURE — 80069 RENAL FUNCTION PANEL: CPT | Performed by: PATHOLOGY

## 2025-02-06 PROCEDURE — 99000 SPECIMEN HANDLING OFFICE-LAB: CPT | Performed by: PATHOLOGY

## 2025-02-06 PROCEDURE — 81001 URINALYSIS AUTO W/SCOPE: CPT | Performed by: PATHOLOGY

## 2025-02-06 PROCEDURE — 87186 SC STD MICRODIL/AGAR DIL: CPT | Performed by: NURSE PRACTITIONER

## 2025-02-06 PROCEDURE — 87088 URINE BACTERIA CULTURE: CPT | Performed by: NURSE PRACTITIONER

## 2025-02-07 ENCOUNTER — MYC MEDICAL ADVICE (OUTPATIENT)
Dept: FAMILY MEDICINE | Facility: CLINIC | Age: 70
End: 2025-02-07
Payer: MEDICARE

## 2025-02-07 NOTE — TELEPHONE ENCOUNTER
General Call      Reason for Call:  Patient called to follow up on her message below. Patient is out of the medication and needs it.     What are your questions or concerns:  Please contact patient. Patient is having a hard time.     Date of last appointment with provider: 8/1/23    Could we send this information to you in [employer] or would you prefer to receive a phone call?:   Patient would like to be contacted via [employer]

## 2025-02-09 LAB
BACTERIA UR CULT: ABNORMAL

## 2025-02-10 NOTE — TELEPHONE ENCOUNTER
Writer responded via [employer].  CHARLENE Flowers, BSN, PHN, RN-[facility]  [phone]

## 2025-02-10 NOTE — PROGRESS NOTES
Assessment and Plan:     Assessment: 69 year old female with a history of ovarian cancer, treated in 2017, with incidental finding of right renal mass, s/p right nephrectomy in 2017 (path results non-cancerous). Updated BETTY obtained in November shows no suspicious findings. We reviewed these results today. She has opted to continue with annual monitoring scans, so will continue these. Recent urine culture grew small counts of Staph hominis in the setting of a normal UA and we discussed at length that this is a normal skin bacteria and therefore likely a sample contaminate, of no concern. However, she would strongly prefer to take a treatment course of antibiotics to clear this to avoid a future issue. She has also continued to struggle with TOÑO, though has not yet followed up with Dr. Fregoso. Reviewed the two recommendations that were made at the time of her last visit with Dr. Fregoso, the topical estrogen cream and Bulkamid injection. She will do some research on these options and follow up with Dr. Fregoso, if desired.     Plan:  -Start nitrofurantoin 100 mg BID x5 days for treatment of recent urine culture.   -Follow up with me in November, 2025 with a renal ultrasound completed beforehand.   -Check on estrogen cream and Bulkamid injection and follow up with Dr. Fregoso, as desired.     Chandrika Byers, CNP  Department of Urology           Chief Complaint:   Renal mass follow up         History of Present Illness:    Hafsa Corona is a 69 year old female with a history of ovarian cancer, s/p surgery and chemotherapy in 2017. At that time she was incidentally found to have a 3 cm mass in the superior pole of the right kidney, suspicious for RCC. Now s/p right nephrectomy in 01/2018 at [place]. 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 Incidentally there was a 0.4 cm oncocytoma identified with the specimen as well. Cystoscopy completed by Dr. Mercado in 2021  for microscopic hematuria and was unremarkable.     I last saw her a little over one year ago and BETTY at that time showed her right nephrectomy without suspicious findings within the nephrectomy bed. Normal left kidney. Updated BETTY from November showed no changes.     Of note, she also has a history of TOÑO for which she has followed with Dr. Fregoso.     Renal panel checked last week showed stable mildly-elevated creatinine. She also had a recent episode of UTI symptoms and while her UA looked suspicious, her culture was ultimately negative. We repeated a UA again last week and UA was negative. Culture grew Staph hominis, a normal skin ozzie.     Today, she states that while she understands the Staph hominis may be a contaminant, she would prefer to still take an antibiotic to clear this in her urine.     Continues to have TOÑO issues. Can be better if she does Kegels. Has seen PFPT in the past but did not enjoy this experience and does not wish to go this route again. Has not had a chance to look into the options that were proposed by Dr. Fregoso in the spring (topical estrogen cream, Bulkamid injections). Interested in doing some research on these now. Does not wish to pursue any treatment option that could impact her sexual functioning.          Past Medical History:     Past Medical History:   Diagnosis Date    Anxiety state, unspecified     [id number]    Arthritis 2014    vague, gradual, not treated really, knees feel it    At high risk for breast cancer 09/27/2019    30.3% lifetime risk by SHERON model    Attention deficit disorder without mention of hyperactivity     Cancer (H) 7/2017 and 1/2018    ovarian and kidney cancers, both treated well    Chronic rhinitis     Depressive disorder lifetime    plus Anxiety plus ADD. Escitalipram, therapy, ADD meds maybe    Heart disease 1999    tachycardia and high cholesterol, managed    History of blood transfusion 07/2017    while in hospital for ovarian cancer     Hypertension 1999 tho BP was too LOW last week. past 12 years Generally OK    Panic disorder without agoraphobia     Pure hypercholesterolemia     Lipitor    Tachycardia, unspecified     9/1999-2/2004    Type II or unspecified type diabetes mellitus without mention of complication, not stated as uncontrolled     diagnosed 2004            Past Surgical History:     Past Surgical History:   Procedure Laterality Date    ABSCESS DRAINAGE Left     left leg     AS REMV KIDNEY,RADICAL Right     BIOPSY  7/2017 and 2/2018    ovarian and kidney cancer biopsies. also 1987 breast benign    BREAST CYST EXCISION  1985    BREAST SURGERY  1987    date unsure. benign breast cyst removed    CATARACT IOL, RT/LT Left 05/18/2018    CATARACT IOL, RT/LT Right 07/1318    COLONOSCOPY  2008 and 2013    polyps removed. Next due fall 2018    ENDOMETRIAL ABLATION  2008    EXCISE MASS BACK N/A 12/6/2021    Procedure: EXCISION, MASS, BACK;  Surgeon: Norris Vela MD;  Location: [facility] OR    HYSTERECTOMY TOTAL ABDOMINAL, BILATERAL SALPINGO-OOPHORECTOMY, NODE DISSECTION, COMBINED Left 7/7/2017    Procedure: COMBINED HYSTERECTOMY TOTAL ABDOMINAL, SALPINGO-OOPHORECTOMY, NODE DISSECTION;  Exploratory Laparotomy, Left Salpingo-Oophorectomy, Cancer Staging, Total Hysterectomy, Omentectomy, Evacuation of abdominal fluid, Lymph Node Dissection  Anesthesia Block ;  Surgeon: Serena Cole MD;  [facility]    HYSTERECTOMY, PAP NO LONGER INDICATED  07/07/2017    Laparotomy; for ovarian cancer staging    HYSTERECTOMY, PAP NO LONGER INDICATED      INSERT PORT VASCULAR ACCESS Right 8/21/2017    Procedure: INSERT PORT VASCULAR ACCESS;  Single Lumen Chest Power Port;  Surgeon: Stephen Mike PA-C;  [facility]    IR PORT REMOVAL RIGHT  11/12/2021    LYMPHADENECTOMY RETROPERITONEAL Bilateral 07/07/2017    Laparotomy; pelvics & para-aortics; for ovarian cancer staging    OMENTECTOMY  07/07/2017    Laparotomy; for ovarian cancer  staging    ORTHOPEDIC SURGERY  1/2002    broken left jaw due to fall, 4 fractures, titanium plates    OTHER SURGICAL HISTORY  01/2002    OTHER SURGICAL HISTORYfacial surgery due to fall     PHACOEMULSIFICATION CLEAR CORNEA WITH STANDARD INTRAOCULAR LENS IMPLANT Right 7/13/2018    Procedure: PHACOEMULSIFICATION CLEAR CORNEA WITH STANDARD INTRAOCULAR LENS IMPLANT;  RIght Eye Phacoemulsification Clear Cornea with Standard Intraocular Lens Placement ;  Surgeon: Mary Jo Massey MD;  [facility]    PHACOEMULSIFICATION CLEAR CORNEA WITH TORIC INTRAOCULAR LENS IMPLANT Left 5/18/2018    Procedure: PHACOEMULSIFICATION CLEAR CORNEA WITH TORIC INTRAOCULAR LENS IMPLANT;  Left Eye Phacoemulsification with Toric Lens;  Surgeon: Mary Jo Massey MD;  [facility]    REMOVE PORT VASCULAR ACCESS Right 11/12/2021    Procedure: REMOVAL, VASCULAR ACCESS PORT right;  Surgeon: Afsaneh Das PA-C;  Location: [facility] OR    SALPINGO OOPHORECTOMY,R/L/KAYY Right 2008    Salpingo Oophorectomy, RT    SALPINGO OOPHORECTOMY,R/L/KAYY Left 07/07/2017    Laparotomy; for ovarian cancer staging    SALPINGOOPHORECTOMY  2008    SURGICAL HISTORY OF -       facial surgery d/t fall in 1/2002    SURGICAL HISTORY OF -       1985 removal of breast cyst    SURGICAL HISTORY OF -   2008    endometrial ablation    YAG CAPSULOTOMY OD (RIGHT EYE)  10/22/2018            Medications     Current Outpatient Medications   Medication Sig Dispense Refill    Acetaminophen (TYLENOL PO) Take 500 mg by mouth as needed for mild pain. 2 tabs prn pain (monthly)      atorvastatin (LIPITOR) 80 MG tablet TAKE 1 TABLET(80 MG) BY MOUTH DAILY 90 tablet 0    B Complex Vitamins (VITAMIN B-COMPLEX PO) Take 50 mg by mouth       CALCIUM-MAGNESIUM-VITAMIN D PO Take 2 tablets by mouth daily      Cholecalciferol (VITAMIN D) 1000 UNIT capsule Take 2,000 Units by mouth daily       clopidogrel (PLAVIX) 75 MG tablet Take 1 tablet (75 mg) by mouth daily 90 tablet 3    EPINEPHrine (ANY BX  GENERIC EQUIV) 0.3 MG/0.3ML injection 2-pack Inject 0.3 mLs (0.3 mg) into the muscle once as needed for anaphylaxis 0.3 mL PRN    fish oil-omega-3 fatty acids 500 MG capsule Take by mouth.      fluticasone (FLONASE) 50 MCG/ACT nasal spray SHAKE LIQUID AND USE 2 SPRAYS IN EACH NOSTRIL DAILY 16 g 0    Magnesium Hydroxide (MAGNESIA PO) Take 500 mg by mouth      metFORMIN (GLUCOPHAGE XR) 500 MG 24 hr tablet TAKE 2 TABLETS BY MOUTH EVERY DAY WITH THE EVENING MEAL 180 tablet 3    Multiple Vitamins-Minerals (MULTIVITAMIN ADULT PO) Take 1 tablet by mouth daily      nitroFURantoin macrocrystal-monohydrate (MACROBID) 100 MG capsule Take 1 capsule (100 mg) by mouth 2 times daily for 5 days. 10 capsule 0    nitroFURantoin macrocrystal-monohydrate (MACROBID) 100 MG capsule TAKE 1 CAPSULE BY MOUTH AFTER INTERCOURSE AS NEEDED 30 capsule PRN    phenazopyridine (PYRIDIUM) 200 MG tablet Take 1 tablet (200 mg) by mouth 3 times daily as needed for irritation 9 tablet 0    potassium aminobenzoate 500 MG CAPS capsule Take by mouth.      Probiotic Product (PRO-BIOTIC BLEND PO) Take 1 capsule by mouth daily Enzymatic Therapy-probiotic pearls      psyllium (METAMUCIL/KONSYL) 58.6 % powder Take by mouth daily.      amphetamine-dextroamphetamine (ADDERALL XR) 20 MG 24 hr capsule Take 20 mg by mouth daily (Patient not taking: Reported on 2/11/2025)      benzonatate (TESSALON) 200 MG capsule Take 1 capsule (200 mg) by mouth 3 times daily as needed for cough (Patient not taking: Reported on 2/11/2025) 30 capsule 0    escitalopram (LEXAPRO) 20 MG tablet Take 1 tablet (20 mg) by mouth daily (Patient not taking: Reported on 10/29/2024) 90 tablet 0     Current Facility-Administered Medications   Medication Dose Route Frequency Provider Last Rate Last Admin    lidocaine (XYLOCAINE) 2 % external gel   Urethral Once Leland Mercado MD                Allergies:   Bee venom, Paclitaxel, Wasps [hornets], Yellow hornet venom [hornet venom], Yellow  jacket venom [honey bee venom], and Sulfa antibiotics         Review of Systems:  From intake questionnaire   Negative 14 system review except as noted on HPI, nurse's note.         Physical Exam:   Patient is a 69 year old  female   Vitals: Blood pressure 119/83, pulse 90, last menstrual period 01/01/2009, SpO2 98%, not currently breastfeeding.  General Appearance Adult: Alert, no acute distress, oriented  Lungs: no respiratory distress, or pursed lip breathing  Heart: No obvious jugular venous distension present  Abdomen: soft, nontender, no organomegaly or masses, There is no height or weight on file to calculate BMI.  : deferred      Labs and Pathology:    I personally reviewed all applicable laboratory data and went over findings with patient  Significant for:    CBC RESULTS:  Recent Labs   Lab Test 09/01/23  1605 11/14/22  1327 08/18/22  1736 11/12/21  0807   WBC 6.7 6.8 6.0 6.0   HGB 11.3* 11.8 10.4* 11.5*    312 366 254        BMP RESULTS:  Recent Labs   Lab Test 02/06/25  1705 08/04/23  1310 08/18/22  1736 07/06/22  1125 07/18/21  2212 06/28/21  1437 03/27/20  1510 01/14/20  0327 01/03/20  1001    140 142 139   < > 143 137 143 138   POTASSIUM 3.9 4.4 3.7 4.1   < > 4.4 3.9 3.8 4.1   CHLORIDE 104 105 107 107   < > 109 105 109 106   CO2 23 23 22 23   < > 28 28 30 24   ANIONGAP 14 12 13 9   < > 6 4 4 8   * 114* 151* 127*   < > 96 80 147* 118*   BUN 22.8 15.4 19.7 28   < > 25 19 35* 24   CR 1.05* 1.15* 1.10* 1.10*   < > 1.20* 1.14* 1.30* 1.24*   GFRESTIMATED 57* 51* 54* 54*   < > 47* 50* 43* 45*   GFRESTBLACK  --   --   --   --   --  54* 58* 50* 52*   COLE 9.6 9.3 9.1 8.7   < > 9.1 8.9 9.1 9.5    < > = values in this interval not displayed.       UA RESULTS:   Recent Labs   Lab Test 02/06/25  1708 01/29/25  1220 06/14/24  1655 09/01/23  1517 11/15/22  1559   SG 1.029  --  >=1.030  --  1.025   URINEPH 5.5  --  7.0  --  6.0   NITRITE Negative  --  Negative  --  Negative   RBCU 2 *  *   < > 3*   WBCU 1 25-50* *   < > <1    < > = values in this interval not displayed.         Imaging:    I personally reviewed all applicable imaging and went over findings with patient.  Significant for:    Results for orders placed or performed in visit on 11/19/24   US Renal Complete Non-Vascular    Narrative    EXAMINATION: US RENAL COMPLETE NON-VASCULAR 11/19/2024 12:54 PM     COMPARISON: Ultrasound 11/19/2024    HISTORY: Renal mass    TECHNIQUE: The kidneys and bladder were scanned in the standard  fashion with specialized ultrasound transducer(s) using both gray  scale and limited color/spectral Doppler techniques.    FINDINGS:    Right kidney: Surgically absent. No suspicious finding in the  nephrectomy bed.    Left kidney: Measures 10.5 cm in length. Parenchyma is of normal  thickness and echogenicity. No focal mass. No hydronephrosis.     Bladder: Unremarkable.        Impression    IMPRESSION:  Right nephrectomy without suspicious finding in the nephrectomy bed.  Left kidney is within normal limits.    I have personally reviewed the examination and initial interpretation  and I agree with the findings.    SANTOS LIZAMA MD         SYSTEM ID:  [id number]     *Note: Due to a large number of results and/or encounters for the requested time period, some results have not been displayed. A complete set of results can be found in Results Review.

## 2025-02-11 ENCOUNTER — OFFICE VISIT (OUTPATIENT)
Dept: UROLOGY | Facility: CLINIC | Age: 70
End: 2025-02-11
Payer: MEDICARE

## 2025-02-11 VITALS — HEART RATE: 90 BPM | SYSTOLIC BLOOD PRESSURE: 119 MMHG | OXYGEN SATURATION: 98 % | DIASTOLIC BLOOD PRESSURE: 83 MMHG

## 2025-02-11 DIAGNOSIS — N28.89 RENAL MASS: ICD-10-CM

## 2025-02-11 DIAGNOSIS — N39.0 URINARY TRACT INFECTION WITHOUT HEMATURIA, SITE UNSPECIFIED: Primary | ICD-10-CM

## 2025-02-11 RX ORDER — NITROFURANTOIN 25; 75 MG/1; MG/1
100 CAPSULE ORAL 2 TIMES DAILY
Qty: 10 CAPSULE | Refills: 0 | Status: SHIPPED | OUTPATIENT
Start: 2025-02-11 | End: 2025-02-16

## 2025-02-11 ASSESSMENT — PAIN SCALES - GENERAL: PAINLEVEL_OUTOF10: NO PAIN (0)

## 2025-02-11 NOTE — PATIENT INSTRUCTIONS
UROLOGY CLINIC VISIT PATIENT INSTRUCTIONS    -Start the nitrofurantoin to treat the bacteria in your urine.   -Follow up with me in November, 2025 with a renal ultrasound completed beforehand.   -Check on the estrogen cream and Bulkamid, per Dr. Fregoso, and follow up with me if interested.     If you have any issues, questions or concerns in the meantime, do not hesitate to contact us at [phone] or via [employer].     Chandrika Byers, CNP  Department of Urology

## 2025-02-11 NOTE — NURSING NOTE
Hafsa Corona is a 69 year old female patient that presents today in clinic for the following:    Chief Complaint   Patient presents with    Follow Up     Renal mass       The patient's allergies and medications were reviewed as noted. A set of vitals were recorded as noted without incident. The patient does not have any other questions for the provider.    Blood pressure 119/83, pulse 90, last menstrual period 01/01/2009, SpO2 98%, not currently breastfeeding. There is no height or weight on file to calculate BMI.    Patient Active Problem List   Diagnosis    Attention-deficit hyperactivity disorder, predominantly inattentive type    PANIC DISORDER     VASOMOTOR RHINITIS    Chronic Maxillary Sinusitis    Tachycardia    Type 2 diabetes mellitus without complication (H)    Mixed hyperlipidemia    Allergic rhinitis due to other allergen    BMI > 35 with comorbidities    Essential (primary) hypertension    Intra-abdominal and pelvic swelling, mass and lump, unspecified site    Major depressive disorder, recurrent episode, mild    Long-term (current) use of anticoagulants [Z79.01]    S/P laparotomy    Ovarian cancer, left (H)    Encounter for long-term (current) use of medications    Ovarian cancer (H)    Peripheral neuropathy due to chemotherapy    Pain in joint, multiple sites    ACP (advance care planning)    Solitary kidney, acquired    Encounter for follow-up surveillance of ovarian cancer    Personal history of DVT (deep vein thrombosis)    Abscess of left lower extremity    ADD (attention deficit disorder) without hyperactivity    Angiomyolipoma of kidney    Anxiety    Coronary artery disease involving native coronary artery of native heart    Depressive disorder    Dyslipidemia    Dysthymic disorder    Generalized anxiety disorder    Heel pain    Lymphedema    Mild anemia    Normocytic anemia    Renal oncocytoma    Panic disorder (episodic paroxysmal anxiety)    At high risk for breast cancer    Chronic kidney  disease, stage 3 (H)    Pelvic and perineal pain    Infection due to 2019 novel coronavirus    Acute embolism and thrombosis of unspecified deep veins of unspecified lower extremity (H)    Cutaneous abscess of limb, unspecified    Lower abdominal pain, unspecified    Major depressive disorder, single episode, mild    Nausea    Vasomotor rhinitis       Allergies   Allergen Reactions    Bee Venom Anaphylaxis and Swelling     Wasps, Yellow Jackets and Hornets    Paclitaxel Anaphylaxis and Difficulty breathing    Wasps [Hornets] Anaphylaxis    Yellow Hornet Venom [Hornet Venom] Anaphylaxis and Swelling    Yellow Jacket Venom [Honey Bee Venom] Anaphylaxis and Swelling    Sulfa Antibiotics Hives and Rash       Current Outpatient Medications   Medication Sig Dispense Refill    Acetaminophen (TYLENOL PO) Take 500 mg by mouth as needed for mild pain. 2 tabs prn pain (monthly)      atorvastatin (LIPITOR) 80 MG tablet TAKE 1 TABLET(80 MG) BY MOUTH DAILY 90 tablet 0    B Complex Vitamins (VITAMIN B-COMPLEX PO) Take 50 mg by mouth       CALCIUM-MAGNESIUM-VITAMIN D PO Take 2 tablets by mouth daily      Cholecalciferol (VITAMIN D) 1000 UNIT capsule Take 2,000 Units by mouth daily       clopidogrel (PLAVIX) 75 MG tablet Take 1 tablet (75 mg) by mouth daily 90 tablet 3    EPINEPHrine (ANY BX GENERIC EQUIV) 0.3 MG/0.3ML injection 2-pack Inject 0.3 mLs (0.3 mg) into the muscle once as needed for anaphylaxis 0.3 mL PRN    fish oil-omega-3 fatty acids 500 MG capsule Take by mouth.      fluticasone (FLONASE) 50 MCG/ACT nasal spray SHAKE LIQUID AND USE 2 SPRAYS IN EACH NOSTRIL DAILY 16 g 0    Magnesium Hydroxide (MAGNESIA PO) Take 500 mg by mouth      metFORMIN (GLUCOPHAGE XR) 500 MG 24 hr tablet TAKE 2 TABLETS BY MOUTH EVERY DAY WITH THE EVENING MEAL 180 tablet 3    Multiple Vitamins-Minerals (MULTIVITAMIN ADULT PO) Take 1 tablet by mouth daily      nitroFURantoin macrocrystal-monohydrate (MACROBID) 100 MG capsule TAKE 1 CAPSULE BY MOUTH  AFTER INTERCOURSE AS NEEDED 30 capsule PRN    phenazopyridine (PYRIDIUM) 200 MG tablet Take 1 tablet (200 mg) by mouth 3 times daily as needed for irritation 9 tablet 0    potassium aminobenzoate 500 MG CAPS capsule Take by mouth.      Probiotic Product (PRO-BIOTIC BLEND PO) Take 1 capsule by mouth daily Enzymatic Therapy-probiotic pearls      psyllium (METAMUCIL/KONSYL) 58.6 % powder Take by mouth daily.      amphetamine-dextroamphetamine (ADDERALL XR) 20 MG 24 hr capsule Take 20 mg by mouth daily (Patient not taking: Reported on 2/11/2025)      benzonatate (TESSALON) 200 MG capsule Take 1 capsule (200 mg) by mouth 3 times daily as needed for cough (Patient not taking: Reported on 2/11/2025) 30 capsule 0    escitalopram (LEXAPRO) 20 MG tablet Take 1 tablet (20 mg) by mouth daily (Patient not taking: Reported on 10/29/2024) 90 tablet 0       Social History     Tobacco Use    Smoking status: Never     Passive exposure: Never    Smokeless tobacco: Never   Vaping Use    Vaping status: Never Used   Substance Use Topics    Alcohol use: Yes     Comment: Very infrequent, a bit of wine or beer 2x per month maybe    Drug use: Yes     Types: Marijuana     Comment: infrequent, for celebrations only, maybe 8x per year       Guera Freitas LPN  2/11/2025  10:41 AM

## 2025-02-11 NOTE — LETTER
2/11/2025       RE: Hafsa Corona  [address]  [place] [address]     Dear Colleague,    Thank you for referring your patient, Hafsa Corona, to the [facility] [place] at [facility]. Please see a copy of my visit note below.         Assessment and Plan:     Assessment: 69 year old female with a history of ovarian cancer, treated in 2017, with incidental finding of right renal mass, s/p right nephrectomy in 2017 (path results non-cancerous). Updated BETTY obtained in November shows no suspicious findings. We reviewed these results today. She has opted to continue with annual monitoring scans, so will continue these. Recent urine culture grew small counts of Staph hominis in the setting of a normal UA and we discussed at length that this is a normal skin bacteria and therefore likely a sample contaminate, of no concern. However, she would strongly prefer to take a treatment course of antibiotics to clear this to avoid a future issue. She has also continued to struggle with TOÑO, though has not yet followed up with Dr. Fregoso. Reviewed the two recommendations that were made at the time of her last visit with Dr. Fregoso, the topical estrogen cream and Bulkamid injection. She will do some research on these options and follow up with Dr. Fregoso, if desired.     Plan:  -Start nitrofurantoin 100 mg BID x5 days for treatment of recent urine culture.   -Follow up with me in November, 2025 with a renal ultrasound completed beforehand.   -Check on estrogen cream and Bulkamid injection and follow up with Dr. Fregoso, as desired.     Chandrika Byers, CNP  Department of Urology           Chief Complaint:   Renal mass follow up         History of Present Illness:    Hafsa Corona is a 69 year old female with a history of ovarian cancer, s/p surgery and chemotherapy in 2017. At that time she was incidentally found to have a 3 cm mass in the superior  pole of the right kidney, suspicious for RCC. Now s/p right nephrectomy in 01/2018 at [place]. 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 Incidentally there was a 0.4 cm oncocytoma identified with the specimen as well. Cystoscopy completed by Dr. Mercado in 2021 for microscopic hematuria and was unremarkable.     I last saw her a little over one year ago and BETTY at that time showed her right nephrectomy without suspicious findings within the nephrectomy bed. Normal left kidney. Updated BETTY from November showed no changes.     Of note, she also has a history of TOÑO for which she has followed with Dr. Fregoso.     Renal panel checked last week showed stable mildly-elevated creatinine. She also had a recent episode of UTI symptoms and while her UA looked suspicious, her culture was ultimately negative. We repeated a UA again last week and UA was negative. Culture grew Staph hominis, a normal skin ozzie.     Today, she states that while she understands the Staph hominis may be a contaminant, she would prefer to still take an antibiotic to clear this in her urine.     Continues to have TOÑO issues. Can be better if she does Kegels. Has seen PFPT in the past but did not enjoy this experience and does not wish to go this route again. Has not had a chance to look into the options that were proposed by Dr. Fregoso in the spring (topical estrogen cream, Bulkamid injections). Interested in doing some research on these now. Does not wish to pursue any treatment option that could impact her sexual functioning.          Past Medical History:     Past Medical History:   Diagnosis Date     Anxiety state, unspecified     [id number]     Arthritis 2014    vague, gradual, not treated really, knees feel it     At high risk for breast cancer 09/27/2019    30.3% lifetime risk by SHERON model     Attention deficit disorder without mention of hyperactivity      Cancer (H) 7/2017 and 1/2018     ovarian and kidney cancers, both treated well     Chronic rhinitis      Depressive disorder lifetime    plus Anxiety plus ADD. Escitalipram, therapy, ADD meds maybe     Heart disease 1999    tachycardia and high cholesterol, managed     History of blood transfusion 07/2017    while in hospital for ovarian cancer     Hypertension 1999    tho BP was too LOW last week. past 12 years Generally OK     Panic disorder without agoraphobia      Pure hypercholesterolemia     Lipitor     Tachycardia, unspecified     9/1999-2/2004     Type II or unspecified type diabetes mellitus without mention of complication, not stated as uncontrolled     diagnosed 2004            Past Surgical History:     Past Surgical History:   Procedure Laterality Date     ABSCESS DRAINAGE Left     left leg      AS REMV KIDNEY,RADICAL Right      BIOPSY  7/2017 and 2/2018    ovarian and kidney cancer biopsies. also 1987 breast benign     BREAST CYST EXCISION  1985     BREAST SURGERY  1987    date unsure. benign breast cyst removed     CATARACT IOL, RT/LT Left 05/18/2018     CATARACT IOL, RT/LT Right 07/1318     COLONOSCOPY  2008 and 2013    polyps removed. Next due fall 2018     ENDOMETRIAL ABLATION  2008     EXCISE MASS BACK N/A 12/6/2021    Procedure: EXCISION, MASS, BACK;  Surgeon: Norris Vela MD;  Location: [facility] OR     HYSTERECTOMY TOTAL ABDOMINAL, BILATERAL SALPINGO-OOPHORECTOMY, NODE DISSECTION, COMBINED Left 7/7/2017    Procedure: COMBINED HYSTERECTOMY TOTAL ABDOMINAL, SALPINGO-OOPHORECTOMY, NODE DISSECTION;  Exploratory Laparotomy, Left Salpingo-Oophorectomy, Cancer Staging, Total Hysterectomy, Omentectomy, Evacuation of abdominal fluid, Lymph Node Dissection  Anesthesia Block ;  Surgeon: Serena Cole MD;  Location: [facility] OR     HYSTERECTOMY, PAP NO LONGER INDICATED  07/07/2017    Laparotomy; for ovarian cancer staging     HYSTERECTOMY, PAP NO LONGER INDICATED       INSERT PORT VASCULAR ACCESS Right 8/21/2017    Procedure:  INSERT PORT VASCULAR ACCESS;  Single Lumen Chest Power Port;  Surgeon: Stephen Mike PA-C;  [facility]: [facility]     IR PORT REMOVAL RIGHT  11/12/2021     LYMPHADENECTOMY RETROPERITONEAL Bilateral 07/07/2017    Laparotomy; pelvics & para-aortics; for ovarian cancer staging     OMENTECTOMY  07/07/2017    Laparotomy; for ovarian cancer staging     ORTHOPEDIC SURGERY  1/2002    broken left jaw due to fall, 4 fractures, titanium plates     OTHER SURGICAL HISTORY  01/2002    OTHER SURGICAL HISTORYfacial surgery due to fall      PHACOEMULSIFICATION CLEAR CORNEA WITH STANDARD INTRAOCULAR LENS IMPLANT Right 7/13/2018    Procedure: PHACOEMULSIFICATION CLEAR CORNEA WITH STANDARD INTRAOCULAR LENS IMPLANT;  RIght Eye Phacoemulsification Clear Cornea with Standard Intraocular Lens Placement ;  Surgeon: Mary Jo Massey MD;  [facility]     PHACOEMULSIFICATION CLEAR CORNEA WITH TORIC INTRAOCULAR LENS IMPLANT Left 5/18/2018    Procedure: PHACOEMULSIFICATION CLEAR CORNEA WITH TORIC INTRAOCULAR LENS IMPLANT;  Left Eye Phacoemulsification with Toric Lens;  Surgeon: Mary Jo Massey MD;  [facility]     REMOVE PORT VASCULAR ACCESS Right 11/12/2021    Procedure: REMOVAL, VASCULAR ACCESS PORT right;  Surgeon: Afsaneh Das PA-C;  Location: [facility] OR     SALPINGO OOPHORECTOMY,R/L/KAYY Right 2008    Salpingo Oophorectomy, RT     SALPINGO OOPHORECTOMY,R/L/KAYY Left 07/07/2017    Laparotomy; for ovarian cancer staging     SALPINGOOPHORECTOMY  2008     SURGICAL HISTORY OF -       facial surgery d/t fall in 1/2002     SURGICAL HISTORY OF -       1985 removal of breast cyst     SURGICAL HISTORY OF -   2008    endometrial ablation     YAG CAPSULOTOMY OD (RIGHT EYE)  10/22/2018            Medications     Current Outpatient Medications   Medication Sig Dispense Refill     Acetaminophen (TYLENOL PO) Take 500 mg by mouth as needed for mild pain. 2 tabs prn pain (monthly)       atorvastatin (LIPITOR) 80 MG tablet TAKE 1 TABLET(80 MG) BY  MOUTH DAILY 90 tablet 0     B Complex Vitamins (VITAMIN B-COMPLEX PO) Take 50 mg by mouth        CALCIUM-MAGNESIUM-VITAMIN D PO Take 2 tablets by mouth daily       Cholecalciferol (VITAMIN D) 1000 UNIT capsule Take 2,000 Units by mouth daily        clopidogrel (PLAVIX) 75 MG tablet Take 1 tablet (75 mg) by mouth daily 90 tablet 3     EPINEPHrine (ANY BX GENERIC EQUIV) 0.3 MG/0.3ML injection 2-pack Inject 0.3 mLs (0.3 mg) into the muscle once as needed for anaphylaxis 0.3 mL PRN     fish oil-omega-3 fatty acids 500 MG capsule Take by mouth.       fluticasone (FLONASE) 50 MCG/ACT nasal spray SHAKE LIQUID AND USE 2 SPRAYS IN EACH NOSTRIL DAILY 16 g 0     Magnesium Hydroxide (MAGNESIA PO) Take 500 mg by mouth       metFORMIN (GLUCOPHAGE XR) 500 MG 24 hr tablet TAKE 2 TABLETS BY MOUTH EVERY DAY WITH THE EVENING MEAL 180 tablet 3     Multiple Vitamins-Minerals (MULTIVITAMIN ADULT PO) Take 1 tablet by mouth daily       nitroFURantoin macrocrystal-monohydrate (MACROBID) 100 MG capsule Take 1 capsule (100 mg) by mouth 2 times daily for 5 days. 10 capsule 0     nitroFURantoin macrocrystal-monohydrate (MACROBID) 100 MG capsule TAKE 1 CAPSULE BY MOUTH AFTER INTERCOURSE AS NEEDED 30 capsule PRN     phenazopyridine (PYRIDIUM) 200 MG tablet Take 1 tablet (200 mg) by mouth 3 times daily as needed for irritation 9 tablet 0     potassium aminobenzoate 500 MG CAPS capsule Take by mouth.       Probiotic Product (PRO-BIOTIC BLEND PO) Take 1 capsule by mouth daily Enzymatic Therapy-probiotic pearls       psyllium (METAMUCIL/KONSYL) 58.6 % powder Take by mouth daily.       amphetamine-dextroamphetamine (ADDERALL XR) 20 MG 24 hr capsule Take 20 mg by mouth daily (Patient not taking: Reported on 2/11/2025)       benzonatate (TESSALON) 200 MG capsule Take 1 capsule (200 mg) by mouth 3 times daily as needed for cough (Patient not taking: Reported on 2/11/2025) 30 capsule 0     escitalopram (LEXAPRO) 20 MG tablet Take 1 tablet (20 mg) by mouth  daily (Patient not taking: Reported on 10/29/2024) 90 tablet 0     Current Facility-Administered Medications   Medication Dose Route Frequency Provider Last Rate Last Admin     lidocaine (XYLOCAINE) 2 % external gel   Urethral Once Leland Mercado MD                Allergies:   Bee venom, Paclitaxel, Wasps [hornets], Yellow hornet venom [hornet venom], Yellow jacket venom [honey bee venom], and Sulfa antibiotics         Review of Systems:  From intake questionnaire   Negative 14 system review except as noted on HPI, nurse's note.         Physical Exam:   Patient is a 69 year old  female   Vitals: Blood pressure 119/83, pulse 90, last menstrual period 01/01/2009, SpO2 98%, not currently breastfeeding.  General Appearance Adult: Alert, no acute distress, oriented  Lungs: no respiratory distress, or pursed lip breathing  Heart: No obvious jugular venous distension present  Abdomen: soft, nontender, no organomegaly or masses, There is no height or weight on file to calculate BMI.  : deferred      Labs and Pathology:    I personally reviewed all applicable laboratory data and went over findings with patient  Significant for:    CBC RESULTS:  Recent Labs   Lab Test 09/01/23  1605 11/14/22  1327 08/18/22  1736 11/12/21  0807   WBC 6.7 6.8 6.0 6.0   HGB 11.3* 11.8 10.4* 11.5*    312 366 254        BMP RESULTS:  Recent Labs   Lab Test 02/06/25  1705 08/04/23  1310 08/18/22  1736 07/06/22  1125 07/18/21  2212 06/28/21  1437 03/27/20  1510 01/14/20  0327 01/03/20  1001    140 142 139   < > 143 137 143 138   POTASSIUM 3.9 4.4 3.7 4.1   < > 4.4 3.9 3.8 4.1   CHLORIDE 104 105 107 107   < > 109 105 109 106   CO2 23 23 22 23   < > 28 28 30 24   ANIONGAP 14 12 13 9   < > 6 4 4 8   * 114* 151* 127*   < > 96 80 147* 118*   BUN 22.8 15.4 19.7 28   < > 25 19 35* 24   CR 1.05* 1.15* 1.10* 1.10*   < > 1.20* 1.14* 1.30* 1.24*   GFRESTIMATED 57* 51* 54* 54*   < > 47* 50* 43* 45*   GFRESTBLACK  --   --   --   --    --  54* 58* 50* 52*   COLE 9.6 9.3 9.1 8.7   < > 9.1 8.9 9.1 9.5    < > = values in this interval not displayed.       UA RESULTS:   Recent Labs   Lab Test 02/06/25  1708 01/29/25  1220 06/14/24  1655 09/01/23  1517 11/15/22  1559   SG 1.029  --  >=1.030  --  1.025   URINEPH 5.5  --  7.0  --  6.0   NITRITE Negative  --  Negative  --  Negative   RBCU 2 * *   < > 3*   WBCU 1 25-50* *   < > <1    < > = values in this interval not displayed.         Imaging:    I personally reviewed all applicable imaging and went over findings with patient.  Significant for:    Results for orders placed or performed in visit on 11/19/24   US Renal Complete Non-Vascular    Narrative    EXAMINATION: US RENAL COMPLETE NON-VASCULAR 11/19/2024 12:54 PM     COMPARISON: Ultrasound 11/19/2024    HISTORY: Renal mass    TECHNIQUE: The kidneys and bladder were scanned in the standard  fashion with specialized ultrasound transducer(s) using both gray  scale and limited color/spectral Doppler techniques.    FINDINGS:    Right kidney: Surgically absent. No suspicious finding in the  nephrectomy bed.    Left kidney: Measures 10.5 cm in length. Parenchyma is of normal  thickness and echogenicity. No focal mass. No hydronephrosis.     Bladder: Unremarkable.        Impression    IMPRESSION:  Right nephrectomy without suspicious finding in the nephrectomy bed.  Left kidney is within normal limits.    I have personally reviewed the examination and initial interpretation  and I agree with the findings.    SANTOS LIZAMA MD         SYSTEM ID:  [id number]     *Note: Due to a large number of results and/or encounters for the requested time period, some results have not been displayed. A complete set of results can be found in Results Review.         Again, thank you for allowing me to participate in the care of your patient.      Sincerely,    Chandrika Byers, CNP

## 2025-03-03 ENCOUNTER — OFFICE VISIT (OUTPATIENT)
Dept: FAMILY MEDICINE | Facility: CLINIC | Age: 70
End: 2025-03-03
Payer: MEDICARE

## 2025-03-03 VITALS
HEIGHT: 62 IN | TEMPERATURE: 97.7 F | HEART RATE: 95 BPM | DIASTOLIC BLOOD PRESSURE: 84 MMHG | RESPIRATION RATE: 18 BRPM | OXYGEN SATURATION: 97 % | WEIGHT: 201.8 LBS | SYSTOLIC BLOOD PRESSURE: 122 MMHG | BODY MASS INDEX: 37.13 KG/M2

## 2025-03-03 DIAGNOSIS — C56.2 MALIGNANT NEOPLASM OF LEFT OVARY (H): ICD-10-CM

## 2025-03-03 DIAGNOSIS — F98.8 ADD (ATTENTION DEFICIT DISORDER) WITHOUT HYPERACTIVITY: ICD-10-CM

## 2025-03-03 DIAGNOSIS — G47.00 INSOMNIA, UNSPECIFIED TYPE: ICD-10-CM

## 2025-03-03 DIAGNOSIS — E66.01 MORBID OBESITY (H): ICD-10-CM

## 2025-03-03 DIAGNOSIS — E11.22 TYPE 2 DIABETES MELLITUS WITH STAGE 3A CHRONIC KIDNEY DISEASE, WITHOUT LONG-TERM CURRENT USE OF INSULIN (H): ICD-10-CM

## 2025-03-03 DIAGNOSIS — F33.0 MAJOR DEPRESSIVE DISORDER, RECURRENT EPISODE, MILD: ICD-10-CM

## 2025-03-03 DIAGNOSIS — J32.0 CHRONIC MAXILLARY SINUSITIS: ICD-10-CM

## 2025-03-03 DIAGNOSIS — Z91.030 BEE ALLERGY STATUS: ICD-10-CM

## 2025-03-03 DIAGNOSIS — R60.0 LEG EDEMA: ICD-10-CM

## 2025-03-03 DIAGNOSIS — N18.31 TYPE 2 DIABETES MELLITUS WITH STAGE 3A CHRONIC KIDNEY DISEASE, WITHOUT LONG-TERM CURRENT USE OF INSULIN (H): ICD-10-CM

## 2025-03-03 DIAGNOSIS — I25.10 CORONARY ARTERY DISEASE INVOLVING NATIVE CORONARY ARTERY OF NATIVE HEART WITHOUT ANGINA PECTORIS: ICD-10-CM

## 2025-03-03 DIAGNOSIS — N18.31 STAGE 3A CHRONIC KIDNEY DISEASE (H): ICD-10-CM

## 2025-03-03 DIAGNOSIS — Z00.00 ENCOUNTER FOR MEDICARE ANNUAL WELLNESS EXAM: Primary | ICD-10-CM

## 2025-03-03 DIAGNOSIS — E78.5 HYPERLIPIDEMIA LDL GOAL <100: ICD-10-CM

## 2025-03-03 DIAGNOSIS — Z12.11 SCREEN FOR COLON CANCER: ICD-10-CM

## 2025-03-03 LAB
ALBUMIN SERPL BCG-MCNC: 4.2 G/DL (ref 3.5–5.2)
ALP SERPL-CCNC: 87 U/L (ref 40–150)
ALT SERPL W P-5'-P-CCNC: 54 U/L (ref 0–50)
AST SERPL W P-5'-P-CCNC: 66 U/L (ref 0–45)
BILIRUB DIRECT SERPL-MCNC: 0.12 MG/DL (ref 0–0.3)
BILIRUB SERPL-MCNC: 0.3 MG/DL
CHOLEST SERPL-MCNC: 165 MG/DL
CREAT UR-MCNC: 96 MG/DL
EST. AVERAGE GLUCOSE BLD GHB EST-MCNC: 137 MG/DL
FASTING STATUS PATIENT QL REPORTED: NO
HBA1C MFR BLD: 6.4 % (ref 0–5.6)
HDLC SERPL-MCNC: 74 MG/DL
HGB BLD-MCNC: 12.1 G/DL (ref 11.7–15.7)
LDLC SERPL CALC-MCNC: 58 MG/DL
MICROALBUMIN UR-MCNC: <12 MG/L
MICROALBUMIN/CREAT UR: NORMAL MG/G{CREAT}
NONHDLC SERPL-MCNC: 91 MG/DL
PROT SERPL-MCNC: 7 G/DL (ref 6.4–8.3)
TRIGL SERPL-MCNC: 165 MG/DL

## 2025-03-03 PROCEDURE — 85018 HEMOGLOBIN: CPT | Performed by: NURSE PRACTITIONER

## 2025-03-03 PROCEDURE — 80076 HEPATIC FUNCTION PANEL: CPT | Performed by: NURSE PRACTITIONER

## 2025-03-03 PROCEDURE — 90662 IIV NO PRSV INCREASED AG IM: CPT | Performed by: NURSE PRACTITIONER

## 2025-03-03 PROCEDURE — G2211 COMPLEX E/M VISIT ADD ON: HCPCS | Performed by: NURSE PRACTITIONER

## 2025-03-03 PROCEDURE — G0008 ADMIN INFLUENZA VIRUS VAC: HCPCS | Performed by: NURSE PRACTITIONER

## 2025-03-03 PROCEDURE — 1125F AMNT PAIN NOTED PAIN PRSNT: CPT | Performed by: NURSE PRACTITIONER

## 2025-03-03 PROCEDURE — 3079F DIAST BP 80-89 MM HG: CPT | Performed by: NURSE PRACTITIONER

## 2025-03-03 PROCEDURE — 80061 LIPID PANEL: CPT | Performed by: NURSE PRACTITIONER

## 2025-03-03 PROCEDURE — 83036 HEMOGLOBIN GLYCOSYLATED A1C: CPT | Performed by: NURSE PRACTITIONER

## 2025-03-03 PROCEDURE — 82570 ASSAY OF URINE CREATININE: CPT | Performed by: NURSE PRACTITIONER

## 2025-03-03 PROCEDURE — 3074F SYST BP LT 130 MM HG: CPT | Performed by: NURSE PRACTITIONER

## 2025-03-03 PROCEDURE — 99214 OFFICE O/P EST MOD 30 MIN: CPT | Mod: 25 | Performed by: NURSE PRACTITIONER

## 2025-03-03 PROCEDURE — 36415 COLL VENOUS BLD VENIPUNCTURE: CPT | Performed by: NURSE PRACTITIONER

## 2025-03-03 PROCEDURE — 82043 UR ALBUMIN QUANTITATIVE: CPT | Performed by: NURSE PRACTITIONER

## 2025-03-03 PROCEDURE — G0438 PPPS, INITIAL VISIT: HCPCS | Performed by: NURSE PRACTITIONER

## 2025-03-03 RX ORDER — EPINEPHRINE 0.3 MG/.3ML
0.3 INJECTION SUBCUTANEOUS
Qty: 0.3 ML | Status: SHIPPED | OUTPATIENT
Start: 2025-03-03

## 2025-03-03 RX ORDER — FLUTICASONE PROPIONATE 50 MCG
SPRAY, SUSPENSION (ML) NASAL
Qty: 16 G | Refills: 12 | Status: SHIPPED | OUTPATIENT
Start: 2025-03-03

## 2025-03-03 RX ORDER — ATORVASTATIN CALCIUM 80 MG/1
TABLET, FILM COATED ORAL
Qty: 90 TABLET | Refills: 4 | Status: SHIPPED | OUTPATIENT
Start: 2025-03-03

## 2025-03-03 SDOH — HEALTH STABILITY: PHYSICAL HEALTH: ON AVERAGE, HOW MANY DAYS PER WEEK DO YOU ENGAGE IN MODERATE TO STRENUOUS EXERCISE (LIKE A BRISK WALK)?: 6 DAYS

## 2025-03-03 ASSESSMENT — COLUMBIA-SUICIDE SEVERITY RATING SCALE - C-SSRS
6. HAVE YOU EVER DONE ANYTHING, STARTED TO DO ANYTHING, OR PREPARED TO DO ANYTHING TO END YOUR LIFE?: NO
2. IN THE PAST MONTH, HAVE YOU ACTUALLY HAD ANY THOUGHTS OF KILLING YOURSELF?: NO
1. WITHIN THE PAST MONTH, HAVE YOU WISHED YOU WERE DEAD OR WISHED YOU COULD GO TO SLEEP AND NOT WAKE UP?: NO

## 2025-03-03 ASSESSMENT — PATIENT HEALTH QUESTIONNAIRE - PHQ9
SUM OF ALL RESPONSES TO PHQ QUESTIONS 1-9: 8
10. IF YOU CHECKED OFF ANY PROBLEMS, HOW DIFFICULT HAVE THESE PROBLEMS MADE IT FOR YOU TO DO YOUR WORK, TAKE CARE OF THINGS AT HOME, OR GET ALONG WITH OTHER PEOPLE: SOMEWHAT DIFFICULT
SUM OF ALL RESPONSES TO PHQ QUESTIONS 1-9: 8

## 2025-03-03 ASSESSMENT — SOCIAL DETERMINANTS OF HEALTH (SDOH): HOW OFTEN DO YOU GET TOGETHER WITH FRIENDS OR RELATIVES?: MORE THAN THREE TIMES A WEEK

## 2025-03-03 ASSESSMENT — PAIN SCALES - GENERAL: PAINLEVEL_OUTOF10: MILD PAIN (1)

## 2025-03-03 NOTE — PATIENT INSTRUCTIONS
Patient Education   Preventive Care Advice   This is general advice given by our system to help you stay healthy. However, your care team may have specific advice just for you. Please talk to your care team about your preventive care needs.  Nutrition  Eat 5 or more servings of fruits and vegetables each day.  Try wheat bread, brown rice and whole grain pasta (instead of white bread, rice, and pasta).  Get enough calcium and vitamin D. Check the label on foods and aim for 100% of the RDA (recommended daily allowance).  Lifestyle  Exercise at least 150 minutes each week  (30 minutes a day, 5 days a week).  Do muscle strengthening activities 2 days a week. These help control your weight and prevent disease.  No smoking.  Wear sunscreen to prevent skin cancer.  Have a dental exam and cleaning every 6 months.  Yearly exams  See your health care team every year to talk about:  Any changes in your health.  Any medicines your care team has prescribed.  Preventive care, family planning, and ways to prevent chronic diseases.  Shots (vaccines)   HPV shots (up to age 26), if you've never had them before.  Hepatitis B shots (up to age 59), if you've never had them before.  COVID-19 shot: Get this shot when it's due.  Flu shot: Get a flu shot every year.  Tetanus shot: Get a tetanus shot every 10 years.  Pneumococcal, hepatitis A, and RSV shots: Ask your care team if you need these based on your risk.  Shingles shot (for age 50 and up)  General health tests  Diabetes screening:  Starting at age 35, Get screened for diabetes at least every 3 years.  If you are younger than age 35, ask your care team if you should be screened for diabetes.  Cholesterol test: At age 39, start having a cholesterol test every 5 years, or more often if advised.  Bone density scan (DEXA): At age 50, ask your care team if you should have this scan for osteoporosis (brittle bones).  Hepatitis C: Get tested at least once in your life.  STIs (sexually  transmitted infections)  Before age 24: Ask your care team if you should be screened for STIs.  After age 24: Get screened for STIs if you're at risk. You are at risk for STIs (including HIV) if:  You are sexually active with more than one person.  You don't use condoms every time.  You or a partner was diagnosed with a sexually transmitted infection.  If you are at risk for HIV, ask about PrEP medicine to prevent HIV.  Get tested for HIV at least once in your life, whether you are at risk for HIV or not.  Cancer screening tests  Cervical cancer screening: If you have a cervix, begin getting regular cervical cancer screening tests starting at age 21.  Breast cancer scan (mammogram): If you've ever had breasts, begin having regular mammograms starting at age 40. This is a scan to check for breast cancer.  Colon cancer screening: It is important to start screening for colon cancer at age 45.  Have a colonoscopy test every 10 years (or more often if you're at risk) Or, ask your provider about stool tests like a FIT test every year or Cologuard test every 3 years.  To learn more about your testing options, visit:   .  For help making a decision, visit:   https://bit.ly/wh58029.  Prostate cancer screening test: If you have a prostate, ask your care team if a prostate cancer screening test (PSA) at age 55 is right for you.  Lung cancer screening: If you are a current or former smoker ages 50 to 80, ask your care team if ongoing lung cancer screenings are right for you.  For informational purposes only. Not to replace the advice of your health care provider. Copyright   2023 [facility]. All rights reserved. Clinically reviewed by the [facility] Transitions Program. [employer] [id number] - REV 01/24.  Learning About Activities of Daily Living  What are activities of daily living?     Activities of daily living (ADLs) are the basic self-care tasks you do every day. These include eating, bathing, dressing,  and moving around.  As you age, and if you have health problems, you may find that it's harder to do some of these tasks. If so, your doctor can suggest ideas that may help.  To measure what kind of help you may need, your doctor will ask how well you are able to do ADLs. Let your doctor know if there are any tasks that you are having trouble doing. This is an important first step to getting help. And when you have the help you need, you can stay as independent as possible.  How will a doctor assess your ADLs?  Asking about ADLs is part of a routine health checkup your doctor will likely do as you age. Your health check might be done in a doctor's office, in your home, or at a hospital. The goal is to find out if you are having any problems that could make it hard to care for yourself or that make it unsafe for you to be on your own.  To measure your ADLs, your doctor will ask how hard it is for you to do routine tasks. Your doctor may also want to know if you have changed the way you do a task because of a health problem. Your doctor may watch how you:  Walk back and forth.  Keep your balance while you stand or walk.  Move from sitting to standing or from a bed to a chair.  Button or unbutton a shirt or sweater.  Remove and put on your shoes.  It's common to feel a little worried or anxious if you find you can't do all the things you used to be able to do. Talking with your doctor about ADLs is a way to make sure you're as safe as possible and able to care for yourself as well as you can. You may want to bring a caregiver, friend, or family member to your checkup. They can help you talk to your doctor.  Follow-up care is a key part of your treatment and safety. Be sure to make and go to all appointments, and call your doctor if you are having problems. It's also a good idea to know your test results and keep a list of the medicines you take.  Current as of: October 24, 2023  Content Version: 14.3    2024 [facility]  [employer].   Care instructions adapted under license by your healthcare professional. If you have questions about a medical condition or this instruction, always ask your healthcare professional. [employer] disclaims any warranty or liability for your use of this information.    Hearing Loss: Care Instructions  Overview     Hearing loss is a sudden or slow decrease in how well you hear. It can range from slight to profound. Permanent hearing loss can occur with aging. It also can happen when you are exposed long-term to loud noise. Examples include listening to loud music, riding motorcycles, or being around other loud machines.  Hearing loss can affect your work and home life. It can make you feel lonely or depressed. You may feel that you have lost your independence. But hearing aids and other devices can help you hear better and feel connected to others.  Follow-up care is a key part of your treatment and safety. Be sure to make and go to all appointments, and call your doctor if you are having problems. It's also a good idea to know your test results and keep a list of the medicines you take.  How can you care for yourself at home?  Avoid loud noises whenever possible. This helps keep your hearing from getting worse.  Always wear hearing protection around loud noises.  Wear a hearing aid as directed.  A professional can help you pick a hearing aid that will work best for you.  You can also get hearing aids over the counter for mild to moderate hearing loss.  Have hearing tests as your doctor suggests. They can show whether your hearing has changed. Your hearing aid may need to be adjusted.  Use other devices as needed. These may include:  Telephone amplifiers and hearing aids that can connect to a television, stereo, radio, or microphone.  Devices that use lights or vibrations. These alert you to the doorbell, a ringing telephone, or a baby monitor.  Television closed-captioning. This shows the  "words at the bottom of the screen. Most new TVs can do this.  TTY (text telephone). This lets you type messages back and forth on the telephone instead of talking or listening. These devices are also called TDD. When messages are typed on the keyboard, they are sent over the phone line to a receiving TTY. The message is shown on a monitor.  Use text messaging, social media, and email if it is hard for you to communicate by telephone.  Try to learn a listening technique called speechreading. It is not lipreading. You pay attention to people's gestures, expressions, posture, and tone of voice. These clues can help you understand what a person is saying. Face the person you are talking to, and have them face you. Make sure the lighting is good. You need to see the other person's face clearly.  Think about counseling if you need help to adjust to your hearing loss.  When should you call for help?  Watch closely for changes in your health, and be sure to contact your doctor if:    You think your hearing is getting worse.     You have new symptoms, such as dizziness or nausea.   Where can you learn more?  Go to https://www.SnapYeti.net/patiented  Enter R798 in the search box to learn more about \"Hearing Loss: Care Instructions.\"  Current as of: September 27, 2023  Content Version: 14.3    2024 [employer].   Care instructions adapted under license by your healthcare professional. If you have questions about a medical condition or this instruction, always ask your healthcare professional. [employer] disclaims any warranty or liability for your use of this information.    Learning About Stress  What is stress?     Stress is your body's response to a hard situation. Your body can have a physical, emotional, or mental response. Stress is a fact of life for most people, and it affects everyone differently. What causes stress for you may not be stressful for someone else.  A lot of things can cause " stress. You may feel stress when you go on a job interview, take a test, or run a race. This kind of short-term stress is normal and even useful. It can help you if you need to work hard or react quickly. For example, stress can help you finish an important job on time.  Long-term stress is caused by ongoing stressful situations or events. Examples of long-term stress include long-term health problems, ongoing problems at work, or conflicts in your family. Long-term stress can harm your health.  How does stress affect your health?  When you are stressed, your body responds as though you are in danger. It makes hormones that speed up your heart, make you breathe faster, and give you a burst of energy. This is called the fight-or-flight stress response. If the stress is over quickly, your body goes back to normal and no harm is done.  But if stress happens too often or lasts too long, it can have bad effects. Long-term stress can make you more likely to get sick, and it can make symptoms of some diseases worse. If you tense up when you are stressed, you may develop neck, shoulder, or low back pain. Stress is linked to high blood pressure and heart disease.  Stress also harms your emotional health. It can make you briscoe, tense, or depressed. Your relationships may suffer, and you may not do well at work or school.  What can you do to manage stress?  You can try these things to help manage stress:   Do something active. Exercise or activity can help reduce stress. Walking is a great way to get started. Even everyday activities such as housecleaning or yard work can help.  Try yoga or amor chi. These techniques combine exercise and meditation. You may need some training at first to learn them.  Do something you enjoy. For example, listen to music or go to a movie. Practice your hobby or do volunteer work.  Meditate. This can help you relax, because you are not worrying about what happened before or what may happen in the  "future.  Do guided imagery. Imagine yourself in any setting that helps you feel calm. You can use online videos, books, or a teacher to guide you.  Do breathing exercises. For example:  From a standing position, bend forward from the waist with your knees slightly bent. Let your arms dangle close to the floor.  Breathe in slowly and deeply as you return to a standing position. Roll up slowly and lift your head last.  Hold your breath for just a few seconds in the standing position.  Breathe out slowly and bend forward from the waist.  Let your feelings out. Talk, laugh, cry, and express anger when you need to. Talking with supportive friends or family, a counselor, or a [place] leader about your feelings is a healthy way to relieve stress. Avoid discussing your feelings with people who make you feel worse.  Write. It may help to write about things that are bothering you. This helps you find out how much stress you feel and what is causing it. When you know this, you can find better ways to cope.  What can you do to prevent stress?  You might try some of these things to help prevent stress:  Manage your time. This helps you find time to do the things you want and need to do.  Get enough sleep. Your body recovers from the stresses of the day while you are sleeping.  Get support. Your family, friends, and community can make a difference in how you experience stress.  Limit your news feed. Avoid or limit time on social media or news that may make you feel stressed.  Do something active. Exercise or activity can help reduce stress. Walking is a great way to get started.  Where can you learn more?  Go to https://www.[employer].net/patiented  Enter N032 in the search box to learn more about \"Learning About Stress.\"  Current as of: October 24, 2023  Content Version: 14.3    2024 [facility] [employer].   Care instructions adapted under license by your healthcare professional. If you have questions about a medical condition or " this instruction, always ask your healthcare professional. [employer] disclaims any warranty or liability for your use of this information.    Learning About Sleeping Well  What does sleeping well mean?     Sleeping well means getting enough sleep to feel good and stay healthy. How much sleep is enough varies among people.  The number of hours you sleep and how you feel when you wake up are both important. If you do not feel refreshed, you probably need more sleep. Another sign of not getting enough sleep is feeling tired during the day.  Experts recommend that adults get at least 7 or more hours of sleep per day. Children and older adults need more sleep.  Why is getting enough sleep important?  Getting enough quality sleep is a basic part of good health. When your sleep suffers, your physical health, mood, and your thoughts can suffer too. You may find yourself feeling more grumpy or stressed. Not getting enough sleep also can lead to serious problems, including injury, accidents, anxiety, and depression.  What might cause poor sleeping?  Many things can cause sleep problems, including:  Changes to your sleep schedule.  Stress. Stress can be caused by fear about a single event, such as giving a speech. Or you may have ongoing stress, such as worry about work or school.  Depression, anxiety, and other mental or emotional conditions.  Changes in your sleep habits or surroundings. This includes changes that happen where you sleep, such as noise, light, or sleeping in a different bed. It also includes changes in your sleep pattern, such as having jet lag or working a late shift.  Health problems, such as pain, breathing problems, and restless legs syndrome.  Lack of regular exercise.  Using alcohol, nicotine, or caffeine before bed.  How can you help yourself?  Here are some tips that may help you sleep more soundly and wake up feeling more refreshed.  Your sleeping area   Use your bedroom only for  "sleeping and sex. A bit of light reading may help you fall asleep. But if it doesn't, do your reading elsewhere in the house. Try not to use your TV, computer, smartphone, or tablet while you are in bed.  Be sure your bed is big enough to stretch out comfortably, especially if you have a sleep partner.  Keep your bedroom quiet, dark, and cool. Use curtains, blinds, or a sleep mask to block out light. To block out noise, use earplugs, soothing music, or a \"white noise\" machine.  Your evening and bedtime routine   Create a relaxing bedtime routine. You might want to take a warm shower or bath, or listen to soothing music.  Go to bed at the same time every night. And get up at the same time every morning, even if you feel tired.  What to avoid   Limit caffeine (coffee, tea, caffeinated sodas) during the day, and don't have any for at least 6 hours before bedtime.  Avoid drinking alcohol before bedtime. Alcohol can cause you to wake up more often during the night.  Try not to smoke or use tobacco, especially in the evening. Nicotine can keep you awake.  Limit naps during the day, especially close to bedtime.  Avoid lying in bed awake for too long. If you can't fall asleep or if you wake up in the middle of the night and can't get back to sleep within about 20 minutes, get out of bed and go to another room until you feel sleepy.  Avoid taking medicine right before bed that may keep you awake or make you feel hyper or energized. Your doctor can tell you if your medicine may do this and if you can take it earlier in the day.  If you can't sleep   Imagine yourself in a peaceful, pleasant scene. Focus on the details and feelings of being in a place that is relaxing.  Get up and do a quiet or boring activity until you feel sleepy.  Avoid drinking any liquids before going to bed to help prevent waking up often to use the bathroom.  Where can you learn more?  Go to https://www.[employer].net/patiented  Enter J942 in the search " "box to learn more about \"Learning About Sleeping Well.\"  Current as of: July 31, 2024  Content Version: 14.3    2024 [employer].   Care instructions adapted under license by your healthcare professional. If you have questions about a medical condition or this instruction, always ask your healthcare professional. [employer] disclaims any warranty or liability for your use of this information.    Bladder Training: Care Instructions  Your Care Instructions     Bladder training is used to treat urge incontinence and stress incontinence. Urge incontinence means that the need to urinate comes on so fast that you can't get to a toilet in time. Stress incontinence means that you leak urine because of pressure on your bladder. For example, it may happen when you laugh, cough, or lift something heavy.  Bladder training can increase how long you can wait before you have to urinate. It can also help your bladder hold more urine. And it can give you better control over the urge to urinate.  It is important to remember that bladder training takes a few weeks to a few months to make a difference. You may not see results right away, but don't give up.  Follow-up care is a key part of your treatment and safety. Be sure to make and go to all appointments, and call your doctor if you are having problems. It's also a good idea to know your test results and keep a list of the medicines you take.  How can you care for yourself at home?  Work with your doctor to come up with a bladder training program that is right for you. You may use one or more of the following methods.  Delayed urination  In the beginning, try to keep from urinating for 5 minutes after you first feel the need to go.  While you wait, take deep, slow breaths to relax. Kegel exercises can also help you delay the need to go to the bathroom.  After some practice, when you can easily wait 5 minutes to urinate, try to wait 10 minutes before you " "urinate.  Slowly increase the waiting period until you are able to control when you have to urinate.  Scheduled urination  Empty your bladder when you first wake up in the morning.  Schedule times throughout the day when you will urinate.  Start by going to the bathroom every hour, even if you don't need to go.  Slowly increase the time between trips to the bathroom.  When you have found a schedule that works well for you, keep doing it.  If you wake up during the night and have to urinate, do it. Apply your schedule to waking hours only.  Kegel exercises  These tighten and strengthen pelvic muscles, which can help you control the flow of urine. (If doing these exercises causes pain, stop doing them and talk with your doctor.) To do Kegel exercises:  Squeeze your muscles as if you were trying not to pass gas. Or squeeze your muscles as if you were stopping the flow of urine. Your belly, legs, and buttocks shouldn't move.  Hold the squeeze for 3 seconds, then relax for 5 to 10 seconds.  Start with 3 seconds, then add 1 second each week until you are able to squeeze for 10 seconds.  Repeat the exercise 10 times a session. Do 3 to 8 sessions a day.  When should you call for help?  Watch closely for changes in your health, and be sure to contact your doctor if:    Your incontinence is getting worse.     You do not get better as expected.   Where can you learn more?  Go to https://www.[employer].net/patiented  Enter V684 in the search box to learn more about \"Bladder Training: Care Instructions.\"  Current as of: April 30, 2024  Content Version: 14.3    2024 [employer].   Care instructions adapted under license by your healthcare professional. If you have questions about a medical condition or this instruction, always ask your healthcare professional. [employer] disclaims any warranty or liability for your use of this information.    Learning About Depression Screening  What is depression " screening?  Depression screening is a way to see if you have depression symptoms. It may be done by a doctor or counselor. It's often part of a routine checkup. That's because your mental health is just as important as your physical health.  Depression is a mental health condition that affects how you feel, think, and act. You may:  Have less energy.  Lose interest in your daily activities.  Feel sad and grouchy for a long time.  Depression is very common. It affects people of all ages.  Many things can lead to depression. Some people become depressed after they have a stroke or find out they have a major illness like cancer or heart disease. The death of a loved one or a breakup may lead to depression. It can run in families. Most experts believe that a combination of inherited genes and stressful life events can cause it.  What happens during screening?  You may be asked to fill out a form about your depression symptoms. You and the doctor will discuss your answers. The doctor may ask you more questions to learn more about how you think, act, and feel.  What happens after screening?  If you have symptoms of depression, your doctor will talk to you about your options.  Doctors usually treat depression with medicines or counseling. Often, combining the two works best. Many people don't get help because they think that they'll get over the depression on their own. But people with depression may not get better unless they get treatment.  The cause of depression is not well understood. There may be many factors involved. But if you have depression, it's not your fault.  A serious symptom of depression is thinking about death or suicide. If you or someone you care about talks about this or about feeling hopeless, get help right away.  It's important to know that depression can be treated. Medicine, counseling, and self-care may help.  Where can you learn more?  Go to https://www.healthwise.net/patiented  Enter T185 in  "the search box to learn more about \"Learning About Depression Screening.\"  Current as of: July 31, 2024  Content Version: 14.3    2024 [employer].   Care instructions adapted under license by your healthcare professional. If you have questions about a medical condition or this instruction, always ask your healthcare professional. [employer] disclaims any warranty or liability for your use of this information.    Substance Use Disorder: Care Instructions  Overview     You can improve your life and health by stopping your use of alcohol or drugs. When you don't drink or use drugs, you may feel and sleep better. You may get along better with your family, friends, and coworkers. There are medicines and programs that can help with substance use disorder.  How can you care for yourself at home?  Here are some ways to help you stay sober and prevent relapse.  If you have been given medicine to help keep you sober or reduce your cravings, be sure to take it exactly as prescribed.  Talk to your doctor about programs that can help you stop using drugs or drinking alcohol.  Do not keep alcohol or drugs in your home.  Plan ahead. Think about what you'll say if other people ask you to drink or use drugs. Try not to spend time with people who drink or use drugs.  Use the time and money spent on drinking or drugs to do something that's important to you.  Preventing a relapse  Have a plan to deal with relapse. Learn to recognize changes in your thinking that lead you to drink or use drugs. Get help before you start to drink or use drugs again.  Try to stay away from situations, friends, or places that may lead you to drink or use drugs.  If you feel the need to drink alcohol or use drugs again, seek help right away. Call a trusted friend or family member. Some people get support from organizations such as Narc[employer] or [employer] or from treatment facilities.  If you relapse, get help as soon as " you can. Some people make a plan with another person that outlines what they want that person to do for them if they relapse. The plan usually includes how to handle the relapse and who to notify in case of relapse.  Don't give up. Remember that a relapse doesn't mean that you have failed. Use the experience to learn the triggers that lead you to drink or use drugs. Then quit again. Recovery is a lifelong process. Many people have several relapses before they are able to quit for good.  Follow-up care is a key part of your treatment and safety. Be sure to make and go to all appointments, and call your doctor if you are having problems. It's also a good idea to know your test results and keep a list of the medicines you take.  When should you call for help?   Call 911  anytime you think you may need emergency care. For example, call if you or someone else:    Has overdosed or has withdrawal signs. Be sure to tell the emergency workers that you are or someone else is using or trying to quit using drugs. Overdose or withdrawal signs may include:  Losing consciousness.  Seizure.  Seeing or hearing things that aren't there (hallucinations).     Is thinking or talking about suicide or harming others.   Where to get help 24 hours a day, 7 days a week   If you or someone you know talks about suicide, self-harm, a mental health crisis, a substance use crisis, or any other kind of emotional distress, get help right away. You can:    Call the Suicide and Crisis Lifeline at 8.     Call 5-698-866-TALK (1-274.900.4621).     Text HOME to 786931 to access the Crisis Text Line.   Consider saving these numbers in your phone.  Go to [facility] for more information or to chat online.  Call your doctor now or seek immediate medical care if:    You are having withdrawal symptoms. These may include nausea or vomiting, sweating, shakiness, and anxiety.   Watch closely for changes in your health, and be sure to contact your doctor  "if:    You have a relapse.     You need more help or support to stop.   Where can you learn more?  Go to https://www.[employer].net/patiented  Enter H573 in the search box to learn more about \"Substance Use Disorder: Care Instructions.\"  Current as of: November 15, 2023  Content Version: 14.3    2024 [employer].   Care instructions adapted under license by your healthcare professional. If you have questions about a medical condition or this instruction, always ask your healthcare professional. [employer] disclaims any warranty or liability for your use of this information.       "
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
## 2025-03-11 ENCOUNTER — VIRTUAL VISIT (OUTPATIENT)
Dept: FAMILY MEDICINE | Facility: CLINIC | Age: 70
End: 2025-03-11
Payer: MEDICARE

## 2025-03-11 DIAGNOSIS — Z12.83 SKIN EXAM, SCREENING FOR CANCER: ICD-10-CM

## 2025-03-11 DIAGNOSIS — R79.89 ELEVATED LFTS: Primary | ICD-10-CM

## 2025-03-11 DIAGNOSIS — C56.2 MALIGNANT NEOPLASM OF LEFT OVARY (H): ICD-10-CM

## 2025-03-11 PROCEDURE — 98007 SYNCH AUDIO-VIDEO EST HI 40: CPT | Performed by: NURSE PRACTITIONER

## 2025-03-11 ASSESSMENT — ANXIETY QUESTIONNAIRES
4. TROUBLE RELAXING: SEVERAL DAYS
6. BECOMING EASILY ANNOYED OR IRRITABLE: SEVERAL DAYS
8. IF YOU CHECKED OFF ANY PROBLEMS, HOW DIFFICULT HAVE THESE MADE IT FOR YOU TO DO YOUR WORK, TAKE CARE OF THINGS AT HOME, OR GET ALONG WITH OTHER PEOPLE?: SOMEWHAT DIFFICULT
5. BEING SO RESTLESS THAT IT IS HARD TO SIT STILL: NOT AT ALL
2. NOT BEING ABLE TO STOP OR CONTROL WORRYING: SEVERAL DAYS
3. WORRYING TOO MUCH ABOUT DIFFERENT THINGS: SEVERAL DAYS
GAD7 TOTAL SCORE: 6
GAD7 TOTAL SCORE: 6
7. FEELING AFRAID AS IF SOMETHING AWFUL MIGHT HAPPEN: SEVERAL DAYS
7. FEELING AFRAID AS IF SOMETHING AWFUL MIGHT HAPPEN: SEVERAL DAYS
1. FEELING NERVOUS, ANXIOUS, OR ON EDGE: SEVERAL DAYS
GAD7 TOTAL SCORE: 6
IF YOU CHECKED OFF ANY PROBLEMS ON THIS QUESTIONNAIRE, HOW DIFFICULT HAVE THESE PROBLEMS MADE IT FOR YOU TO DO YOUR WORK, TAKE CARE OF THINGS AT HOME, OR GET ALONG WITH OTHER PEOPLE: SOMEWHAT DIFFICULT

## 2025-03-11 NOTE — PROGRESS NOTES
"Hafsa is a 70 year old who is being evaluated via a billable video visit.          Assessment & Plan     (R79.89) Elevated LFTs  (primary encounter diagnosis)  Comment:   Plan: Hepatic panel (Albumin, ALT, AST, Bili, Alk         Phos, TP), CANCELED: Hepatic panel (Albumin,         ALT, AST, Bili, Alk Phos, TP)        Discussed diet for possible fatty liver and high triglycerides.  Recheck labs in 3 months.     (Z12.83) Skin exam, screening for cancer  Comment:   Plan: Adult Dermatology  Referral        Referral to dermatology given.    (C56.2) Malignant neoplasm of left ovary (H)  Comment: ASHISH  Plan: She will continue to follow up with oncology.       I spent a total of 42 minutes on the day of the visit.   Time spent by me today doing chart review, history and exam, documentation and further activities per the note    The longitudinal plan of care for the diagnosis(es)/condition(s) as documented were addressed during this visit. Due to the added complexity in care, I will continue to support Hafsa in the subsequent management and with ongoing continuity of care.          BMI  Estimated body mass index is 36.99 kg/m  as calculated from the following:    Height as of 3/3/25: 1.573 m (5' 1.93\").    Weight as of 3/3/25: 91.5 kg (201 lb 12.8 oz).         Subjective   Hafsa is a 70 year old, presenting for the following health issues:  No chief complaint on file.      Video Start Time: 5:11 PM    HPI      She was in last week and had labs done, LFTs were mildly elevated.  She rarely drinks alcohol (0-2 glasses a month).  She does have type 2 diabetes and triglycerides were mildly elevated.    She recently started melatonin for insomnia, which she feels is helping.                  Objective           Vitals:  No vitals were obtained today due to virtual visit.    Physical Exam   GENERAL: alert and no distress  EYES: Eyes grossly normal to inspection.  No discharge or erythema, or obvious scleral/conjunctival " abnormalities.  RESP: No audible wheeze, cough, or visible cyanosis.    SKIN: Visible skin clear. No significant rash, abnormal pigmentation or lesions.  NEURO: Cranial nerves grossly intact.  Mentation and speech appropriate for age.  PSYCH: Affect is weepy          Video-Visit Details    Type of service:  Video Visit   Video End Time: 5:51  Originating Location (pt. Location): Home    Distant Location (provider location):  On-site  Platform used for Video Visit: Tadeo  Signed Electronically by: Morelia Ayon NP

## 2025-03-26 ENCOUNTER — MYC MEDICAL ADVICE (OUTPATIENT)
Dept: FAMILY MEDICINE | Facility: CLINIC | Age: 70
End: 2025-03-26
Payer: MEDICARE

## 2025-03-26 DIAGNOSIS — J32.0 CHRONIC MAXILLARY SINUSITIS: Primary | ICD-10-CM

## 2025-03-26 DIAGNOSIS — R04.0 NASAL BLEEDING: ICD-10-CM

## 2025-03-27 NOTE — TELEPHONE ENCOUNTER
Melly - see MyChart, referral pended below for review/edits (uncertain which priority most appropriate for this issue)    CHARLENE Rollins, BSN, PHN, AMB-BC (she/her)  [facility] RN

## 2025-04-04 ENCOUNTER — TRANSFERRED RECORDS (OUTPATIENT)
Dept: MULTI SPECIALTY CLINIC | Facility: CLINIC | Age: 70
End: 2025-04-04

## 2025-04-04 LAB — RETINOPATHY: NORMAL

## 2025-05-05 ENCOUNTER — ANCILLARY PROCEDURE (OUTPATIENT)
Dept: MRI IMAGING | Facility: CLINIC | Age: 70
End: 2025-05-05
Payer: MEDICARE

## 2025-05-05 DIAGNOSIS — R92.30 DENSE BREAST: ICD-10-CM

## 2025-05-05 DIAGNOSIS — Z12.39 BREAST CANCER SCREENING, HIGH RISK PATIENT: ICD-10-CM

## 2025-05-05 PROCEDURE — 77049 MRI BREAST C-+ W/CAD BI: CPT | Performed by: RADIOLOGY

## 2025-05-05 PROCEDURE — A9585 GADOBUTROL INJECTION: HCPCS | Mod: JW | Performed by: RADIOLOGY

## 2025-05-05 RX ORDER — GADOBUTROL 604.72 MG/ML
10 INJECTION INTRAVENOUS ONCE
Status: COMPLETED | OUTPATIENT
Start: 2025-05-05 | End: 2025-05-05

## 2025-05-05 RX ADMIN — GADOBUTROL 9 ML: 604.72 INJECTION INTRAVENOUS at 13:48

## 2025-05-14 ENCOUNTER — VIRTUAL VISIT (OUTPATIENT)
Dept: BEHAVIORAL HEALTH | Facility: CLINIC | Age: 70
End: 2025-05-14
Payer: MEDICARE

## 2025-05-14 DIAGNOSIS — F43.21 ADJUSTMENT DISORDER WITH DEPRESSED MOOD: Primary | ICD-10-CM

## 2025-05-14 PROCEDURE — 90832 PSYTX W PT 30 MINUTES: CPT | Performed by: SOCIAL WORKER

## 2025-05-14 ASSESSMENT — PATIENT HEALTH QUESTIONNAIRE - PHQ9
SUM OF ALL RESPONSES TO PHQ QUESTIONS 1-9: 7
10. IF YOU CHECKED OFF ANY PROBLEMS, HOW DIFFICULT HAVE THESE PROBLEMS MADE IT FOR YOU TO DO YOUR WORK, TAKE CARE OF THINGS AT HOME, OR GET ALONG WITH OTHER PEOPLE: SOMEWHAT DIFFICULT
SUM OF ALL RESPONSES TO PHQ QUESTIONS 1-9: 7
SUM OF ALL RESPONSES TO PHQ QUESTIONS 1-9: 7

## 2025-05-14 NOTE — PROGRESS NOTES
[facility]: Integrated Behavioral Health    Integrated Behavioral Health   Mental Health & Addiction Services      Progress Note - Initial [facility] Visit     Patient Name: Hafsa Corona    Date: May 14, 2025  Service Type: Individual   Visit Start Time: 10:10 AM  Visit End Time:  10:45 AM   Attendees: Patient   Service Modality: In-person     [facility] Visit Activities (Refresh list every visit): NEW         DATA:     Interactive Complexity: No   Crisis: No     Assessments completed:     The following assessments were completed by patient for this visit:  PHQ9:       6/25/2021     7:24 AM 6/3/2022     1:45 PM 9/14/2022     8:45 AM 7/31/2023     5:26 PM 3/3/2025     2:55 PM 5/14/2025     9:03 AM 5/14/2025    10:01 AM   PHQ-9 SCORE   PHQ-9 Total Score MyChart  0 2 (Minimal depression) 4 (Minimal depression) 8 (Mild depression)  7 (Mild depression)   PHQ-9 Total Score 2 0 2 4 8  7  7        Patient-reported     GAD7:       9/14/2022     8:46 AM 3/11/2025     4:59 PM   BLAIR-7 SCORE   Total Score 1 (minimal anxiety) 6 (mild anxiety)   Total Score 1 6        Patient-reported     CAGE-AID:       5/14/2025     9:08 AM 5/14/2025    10:02 AM   CAGE-AID Total Score   Total Score 0  0    Total Score MyChart  0 (A total score of 2 or greater is considered clinically significant)       Patient-reported     PROMIS 10-Global Health (all questions and answers displayed):       5/14/2025     9:08 AM 5/14/2025    10:01 AM   PROMIS 10   In general, would you say your health is:  Fair   In general, would you say your quality of life is:  Fair   In general, how would you rate your physical health?  Fair   In general, how would you rate your mental health, including your mood and your ability to think?  Poor   In general, how would you rate your satisfaction with your social activities and relationships?  Fair   In general, please rate how well you carry out your usual social activities and roles  Fair   To what  extent are you able to carry out your everyday physical activities such as walking, climbing stairs, carrying groceries, or moving a chair?  Mostly   In the past 7 days, how often have you been bothered by emotional problems such as feeling anxious, depressed, or irritable?  Often   In the past 7 days, how would you rate your fatigue on average?  Severe   In the past 7 days, how would you rate your pain on average, where 0 means no pain, and 10 means worst imaginable pain?  5   In general, would you say your health is: 2 2   In general, would you say your quality of life is: 2 2   In general, how would you rate your physical health? 2 2   In general, how would you rate your mental health, including your mood and your ability to think? 1 1   In general, how would you rate your satisfaction with your social activities and relationships? 2 2   In general, please rate how well you carry out your usual social activities and roles. (This includes activities at home, at work and in your community, and responsibilities as a parent, child, spouse, employee, friend, etc.) 2 2   To what extent are you able to carry out your everyday physical activities such as walking, climbing stairs, carrying groceries, or moving a chair? 4 4   In the past 7 days, how often have you been bothered by emotional problems such as feeling anxious, depressed, or irritable? 4 4   In the past 7 days, how would you rate your fatigue on average? 4 4   In the past 7 days, how would you rate your pain on average, where 0 means no pain, and 10 means worst imaginable pain? 5 5   Global Mental Health Score 7  7    Global Physical Health Score 11  11    PROMIS TOTAL - SUBSCORES 18  18        Patient-reported     [place] Suicide Severity Rating Scale (Lifetime/Recent)      5/14/2025    10:31 AM   [place] Suicide Severity Rating (Lifetime/Recent)   1. Wish to be Dead (Lifetime) Y   1. Wish to be Dead (Past 1 Month) Y   2. Non-Specific Active Suicidal  "Thoughts (Lifetime) N   2. Non-Specific Active Suicidal Thoughts (Past 1 Month) N   3. Active Suicidal Ideation with any Methods (Not Plan) Without Intent to Act (Lifetime) N   3. Active Suicidal Ideation with any Methods (Not Plan) Without Intent to Act (Past 1 Month) N   4. Active Suicidal Ideation with Some Intent to Act, Without Specific Plan (Lifetime) N   4. Active Suicidal Ideation with Some Intent to Act, Without Specific Plan (Past 1 Month) N   5. Active Suicidal Ideation with Specific Plan and Intent (Lifetime) N   5. Active Suicidal Ideation with Specific Plan and Intent (Past 1 Month) N   Actual Attempt (Lifetime) N   Has subject engaged in non-suicidal self-injurious behavior? (Lifetime) N   Interrupted Attempts (Lifetime) N   Aborted or Self-Interrupted Attempt (Lifetime) N   Preparatory Acts or Behavior (Lifetime) N   Calculated C-SSRS Risk Score (Lifetime/Recent) Low Risk        Referral:   Patient was referred to [facility] by self.    Reason for referral: determine behavioral health treatment options.      [facility] introduced self and role. Discussed informed consent and limits to confidentiality.     Presenting Concerns/ Current Stressors:    Pt had some things that improved this week but things are still hard.  Pt received a couple of grants which helped her financially.  Pt has had someone come in to help her clean her apartment.  There has been some improvement on family relationships and have been helping her financially.  Pt is still struggling emotionally, not sleeping well, and grieving. Wakes up to go to bathroom and can't get back to sleep.   \"Why do I hang on to stuff? Because of loss\" \"and artistic side\".  As she is sorting through things she is grieving the loss of her parents all over again and the loss of her two dogs. Pt is also doing a major life review.   Pt dx of ovarian cancer in 2017. Took self off antidepressants since August and is feeling good about this.     Therapeutic " Interventions:  Motivational Interviewing (MI): Validated patient's thoughts, feelings and experience. Expressed respect for patient's autonomy in decision making.  Asked open-ended questions to invite patient's self-reflection and self-direction around change and what is important for them in working towards their goals.  Expressed and demonstrated empathy through reflective listening.   Psycho-education: Provided psycho-education about patient's behavioral health condition and symptoms. Explained and reviewed treatment options.    Response to treatment interventions:   Patient was receptive to interventions utilized.  Patient was engaged in the therapy process.      Safety Issues and Plan for Safety and Risk Management:     Patient has had a history of suicidal ideation: SI no plan or intent and denies a history of suicide attempts, self-injurious behavior, homicidal ideation, homicidal behavior, and and other safety concerns   Patient denies current fears or concerns for personal safety.   Patient reports the following current or recent suicidal ideation or behaviors: SI of wishing she could go to sleep and not wake up, no plan or intent.   Patient denies current or recent homicidal ideation or behaviors.   Patient denies current or recent self injurious behavior or ideation.   Patient denies other safety concerns.   Recommended that patient call 911 or go to the local ED should there be a change in any of these risk factors   Patient reports there are no firearms in the house.       ASSESSMENT:   Mental Status:     Appearance:   Appropriate    Eye Contact:   Good    Psychomotor Behavior: Normal    Attitude:   Friendly Pleasant   Orientation:   All   Speech Rate / Production: Normal/ Responsive   Volume:   Normal    Mood:    Depressed  Sad  Grieving   Affect:    Appropriate    Thought Content:  Clear    Thought Form:  Goal Directed    Insight:    Good         Diagnostic Criteria:   Adjustment Disorder  A. The  development of emotional or behavioral symptoms in response to an identifiable stressor(s) occurring within 3 months of the onset of the stressor(s)  B. These symptoms or behaviors are clinically significant, as evidenced by one or both of the following:       - Marked distress that is out of proportion to the severity/intensity of the stressor (with consideration for external context & culture)  C. The stress-related disturbance does not meet criteria for another disorder & is not not an exacerbation of another mental disorder  D. The symptoms do not represent normal bereavement  E. Once the stressor or its consequences have terminated, the symptoms do not persist for more than an additional 6 months       * Adjustment Disorder with Depressed Mood: The predominant manifestations are symptoms such as low mood, tearfulness, or feelings of hopelessness        DSM5 Diagnoses: (Sustained by DSM5 Criteria Listed Above)     Diagnoses: Adjustment Disorders  309.0 (F43.21) With depressed mood     Psychosocial / Contextual Factors: Financial Strain and Grief/Loss       Collateral Reports Completed:   Not Applicable        PLAN: (Homework, other):     1. Patient was provided:  recommendation to schedule follow-up with [facility]     2. Provider recommended the following referrals: TBD.        3. Suicide Risk and Safety Concerns were assessed for Hafsa Corona    Safety Plan:   Patient denied any current/recent/lifetime history of suicidal ideation and/or behaviors. Recommended that patient call 911 or go to the local ED should there be a change in any of these risk factors       CHICA Lowry, [facility]   May 14, 2025

## 2025-05-29 ENCOUNTER — VIRTUAL VISIT (OUTPATIENT)
Dept: BEHAVIORAL HEALTH | Facility: CLINIC | Age: 70
End: 2025-05-29
Payer: MEDICARE

## 2025-05-29 DIAGNOSIS — F43.21 ADJUSTMENT DISORDER WITH DEPRESSED MOOD: Primary | ICD-10-CM
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
## 2025-05-30 ENCOUNTER — PATIENT OUTREACH (OUTPATIENT)
Dept: CARE COORDINATION | Facility: CLINIC | Age: 70
End: 2025-05-30
Payer: MEDICARE

## 2025-05-30 NOTE — PROGRESS NOTES
Clinic Care Coordination Contact  [facility]/Melodie    Clinical Data: Care Coordinator Outreach    Outreach Documentation Number of Outreach Attempt   5/30/2025   3:01 PM 1       Unable to leave a message due to: Voicemail is full.      Plan:  Care Coordinator will try to reach patient again in 1-2 business days.    [facility] Health Worker   [facility] Care Coordination  [email or web address]  Office: [phone]

## 2025-05-30 NOTE — LETTER
[facility] CARE COORDINATION  [address]  [place] [address]    June 2, 2025    Hafsa Corona  [address]  [place] [address]      Dear Hafsa,    I am a clinic community health worker who works with Morelia Ayon NP with the [facility]. I wanted to introduce myself and provide you with my contact information for you to be able to call me with any questions or concerns. Below is a description of clinic care coordination and how I can further assist you.       The clinic care coordination team is made up of a registered nurse, , financial resource worker and community health worker who understand the health care system. The goal of clinic care coordination is to help you manage your health and improve access to the health care system. Our team works alongside your provider to assist you in determining your health and social needs. We can help you obtain health care and community resources, providing you with necessary information and education. We can work with you through any barriers and develop a care plan that helps coordinate and strengthen the communication between you and your care team.  Our services are voluntary and are offered without charge to you personally.    Please feel free to contact me with any questions or concerns regarding care coordination and what we can offer.      We are focused on providing you with the highest-quality healthcare experience possible.    Sincerely,     [facility] Health Worker   [facility]  Clinic Care Coordination  [email or web address]  Office: [phone]'

## 2025-06-02 ENCOUNTER — LAB (OUTPATIENT)
Dept: LAB | Facility: CLINIC | Age: 70
End: 2025-06-02
Payer: MEDICARE

## 2025-06-02 DIAGNOSIS — C56.2 OVARIAN CANCER, LEFT (H): ICD-10-CM

## 2025-06-02 DIAGNOSIS — E11.9 TYPE 2 DIABETES MELLITUS WITHOUT COMPLICATION, WITHOUT LONG-TERM CURRENT USE OF INSULIN (H): ICD-10-CM

## 2025-06-02 DIAGNOSIS — R79.89 ELEVATED LFTS: ICD-10-CM

## 2025-06-02 DIAGNOSIS — Z85.43 ENCOUNTER FOR FOLLOW-UP SURVEILLANCE OF OVARIAN CANCER: ICD-10-CM

## 2025-06-02 DIAGNOSIS — Z08 ENCOUNTER FOR FOLLOW-UP SURVEILLANCE OF OVARIAN CANCER: ICD-10-CM

## 2025-06-02 LAB
ALBUMIN SERPL BCG-MCNC: 4.2 G/DL (ref 3.5–5.2)
ALP SERPL-CCNC: 88 U/L (ref 40–150)
ALT SERPL W P-5'-P-CCNC: 22 U/L (ref 0–50)
AST SERPL W P-5'-P-CCNC: 35 U/L (ref 0–45)
BILIRUB SERPL-MCNC: 0.2 MG/DL
BILIRUBIN DIRECT (ROCHE PRO & PURE): 0.14 MG/DL (ref 0–0.45)
CANCER AG125 SERPL-ACNC: 16 U/ML
PROT SERPL-MCNC: 6.9 G/DL (ref 6.4–8.3)

## 2025-06-02 PROCEDURE — 36415 COLL VENOUS BLD VENIPUNCTURE: CPT

## 2025-06-02 PROCEDURE — 80076 HEPATIC FUNCTION PANEL: CPT

## 2025-06-02 PROCEDURE — 86304 IMMUNOASSAY TUMOR CA 125: CPT

## 2025-06-02 RX ORDER — METFORMIN HYDROCHLORIDE 500 MG/1
TABLET, EXTENDED RELEASE ORAL
Qty: 180 TABLET | Refills: 3 | OUTPATIENT
Start: 2025-06-02

## 2025-06-02 NOTE — PROGRESS NOTES
Clinic Care Coordination Contact  [facility]/Voicemail    Clinical Data: Care Coordinator Outreach    Outreach Documentation Number of Outreach Attempt   5/30/2025   3:01 PM 1   6/2/2025   3:45 PM 2       Left message on patient's voicemail with call back information and requested return call.      Plan: Care Coordinator will send care coordination introduction letter with care coordinator contact information and explanation of care coordination services via [employer]. Care Coordinator will do no further outreaches at this time.    [facility] Health Worker   [facility] Care Coordination  [email or web address]  Office: [phone]

## 2025-06-03 ENCOUNTER — RESULTS FOLLOW-UP (OUTPATIENT)
Dept: FAMILY MEDICINE | Facility: CLINIC | Age: 70
End: 2025-06-03

## 2025-06-03 ENCOUNTER — RESULTS FOLLOW-UP (OUTPATIENT)
Dept: ONCOLOGY | Facility: HOSPITAL | Age: 70
End: 2025-06-03

## 2025-06-03 NOTE — PROGRESS NOTES
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 She completed treatment chemotherapy on 10/16/17. She is here today a surveillance visit.       Oncology History:  The patient has had a recent episode of lower abdominal pain in early July.  She was subsequently sent to the emergency room and was found to have a large 9 cm complex ovarian mass. She was admitted to the hospital for pain control.     Presented 2017 with LLE pain and swelling, found to have a distal DVT  2017 switched from warfarin to rivaroxaban, plan for 3 months of therapy and consider duration of therapy     7/3/17:  1187   17: Exploratory laparotomy, total abdominal hysterectomy, left salpingo-oophorectomy, cancer staging including infracolic omentectomy, bilateral pelvic & para-aortic lymphadenectomy, peritoneal biopsies, evacuation of pelvic fluid by Dr. Cole.    FINAL DIAGNOSIS:   A. LEFT OVARY AND FALLOPIAN TUBE, LEFT SALPINGO-OOPHORECTOMY:   - Ovarian clear cell carcinoma   - Background of endometriosis   - Fallopian tube with no significant histologic abnormality.   B. UTERUS, HYSTERECTOMY:   -  Inactive endometrium   -  Myometrium with adenomyosis and leiomyoma.   -  Cervix with squamous metaplasia.   C. PERITONEUM, RIGHT PELVIS, BIOPSY:   - Fibroadipose tissue, negative for malignancy.   D. LYMPH NODES, RIGHT PELVIC, DISSECTION:   - Thirteen benign lymph nodes (0/13).   E. LYMPH NODES, LEFT PELVIC, DISSECTION:   - Seven benign lymph nodes (0/7).   F. PERITONEUM, LEFT PELVIS, BIOPSY:   - Fibroadipose tissue, negative for malignancy.   G. PERITONEUM, BLADDER, BIOPSY:   - Fibroadipose tissue  with acute and chronic inflammation, negative for malignancy.   H. PERITONEUM, POSTERIOR CUL-DE-SAC,  BIOPSY:   - Fibroadipose tissue, negative for malignancy.   I. PERITONEUM, LEFT PARACOLIC GUTTER, BIOPSY:   - Fibroadipose tissue, negative for malignancy.   J. LYMPH NODES, LEFT PARA-AORTIC, DISSECTION:   - Five benign lymph nodes (0/5).   K. LYMPH NODES, RIGHT PARA-AORTIC, DISSECTION:   - Two benign lymph nodes (0/2).   L. PERITONEUM, RIGHT PARACOLIC GUTTER, BIOPSY:   - Fibroadipose tissue, negative for malignancy.   M. OMENTUM, OMENTECTOMY:   - Adipose tissue with reactive changes, negative for malignancy.          07/31/17: Dr Shaffer follow up: Discussed with the patient that given the high risk histology, as well as ruptured mass before surgery, she would be qualified at higher risk of recurrence despite early stage ovarian cancer.  Recommended adjuvant chemotherapy. Plan for carboplatin of AUC of 6 and paclitaxel of 175, m2 for 3 cycles. Referral for genetic counselor and will see her back after those 3 cycles  08/03/17: Call from patient stating she is going for a second opinion and would like chemotherapy rescheduled to week of 8/14/17.      08/16/17: Cycle #1 Carbo/Taxol.  73. Significant paclitaxel reaction.  08/30/17: C1 carboplatin/inpatient paclitaxel desensitization.   09/25/17: C2 carboplatin/paclitaxel- inpatient paclitaxel desensitization.  15.  10/16/17: C3 carboplatin/paclitaxel- switched to docetaxel given her neuropathy.  10.  11/14/17:  8. CT CAP:  IMPRESSION:   1. Post surgical changes of hysterectomy and bilateral  salpingo-oophorectomy. Nodular soft tissue density in the posterior  cul-de-sac along with ill-defined nodular thickening in the left  pelvis, suspicious for residual disease or metastatic deposits.   2. There is a 3 cm mass in the superior pole of the right kidney,  possibly extending into the renal hilum. Represents RCC until proven  otherwise. Consider renal mass protocol CT to assess renal vein  involvement.  3. Stable sub-4 mm pulmonary nodules,  continued attention on  follow-up.     11/16/17: CT renal mass protocol  IMPRESSION:  1. Arterially enhancing mass measuring 3.6 x 2.1 x 2.2 cm mass arising  from superior posterior of the right kidney, this is renal cell  carcinoma until proven otherwise.  2. Stable fat-containing umbilical hernia.     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 Margins negative. Three month surveillance plan with [facility].     2/26/18:  14  5/22/18:  11   8/23/18:  11  12/12/18:  10  3/8/19:  12  6/19/19:  13  9/23/19:  12     11/8/19: CT Chest:   IMPRESSION:   1. Stable, sub-4 mm pulmonary nodules as above.  2. Stable hypodense, subcentimeter nodules in the thyroid.     3/27/20:  14  10/1/20:  15  3/29/21:  10     7/18/21 CT A/P for abdominal pain in ER: IMPRESSION:   1.  No acute process in the abdomen or pelvis.  2.  Colonic diverticulosis without evidence for diverticulitis.  3.  Post right nephrectomy, hysterectomy, and oophorectomy     10/5/2021:  13  4/1/22:  13  10/21/22:  15     5/23/2023 [facility] ER for left leg swelling and redness. Imaging:  US Lower Extremity Venous Duplex Left  Final Result  IMPRESSION:  1.  No deep venous thrombosis in the left lower extremity.  2.  Superficial thrombophlebitis of greater saphenous vein.     6/1/2023:  16.  6/4/24:  12.  6/2/25:  16.              Today she comes to clinic feeling well from a gynecologic perspective.  She did recently lose 2 dogs and is currently grieving.  She has a therapist she likes.    -Vaginal bleeding/discharge: No  -Abdominal pain/bloating: No  -Appetite: Normal  -Weight loss: No  -Bowel/bladder habits: Some bowel changes but tied to foods, probiotics, a couple utis saw urology, asks about topical estrogen  -Leg swelling: She also continues with left leg lymphedema. Better recently   -She asks about her risk for recurrence at  this point.          Health Maintenance  Colonoscopy: 1/6/2020, repeat in 5 years  Mammogram: 10/29/24  Annual physical: 3/3/25    Past Medical History:    Past Medical History:   Diagnosis Date    Anxiety state, unspecified     [id number]    Arthritis 2014    vague, gradual, not treated really, knees feel it    At high risk for breast cancer 09/27/2019    30.3% lifetime risk by SHERON model    Attention deficit disorder without mention of hyperactivity     Cancer (H) 7/2017 and 1/2018    ovarian and kidney cancers, both treated well    Chronic rhinitis     Depressive disorder lifetime    plus Anxiety plus ADD. Escitalipram, therapy, ADD meds maybe    Heart disease 1999    tachycardia and high cholesterol, managed    History of blood transfusion 07/2017    while in hospital for ovarian cancer    Hypertension 1999    tho BP was too LOW last week. past 12 years Generally OK    Panic disorder without agoraphobia     Pure hypercholesterolemia     Lipitor    Tachycardia, unspecified     9/1999-2/2004    Type II or unspecified type diabetes mellitus without mention of complication, not stated as uncontrolled     diagnosed 2004         Past Surgical History:    Past Surgical History:   Procedure Laterality Date    ABSCESS DRAINAGE Left     left leg     AS REMV KIDNEY,RADICAL Right     BIOPSY  7/2017 and 2/2018    ovarian and kidney cancer biopsies. also 1987 breast benign    BREAST CYST EXCISION  1985    BREAST SURGERY  1987    date unsure. benign breast cyst removed    CATARACT IOL, RT/LT Left 05/18/2018    CATARACT IOL, RT/LT Right 07/1318    COLONOSCOPY  2008 and 2013    polyps removed. Next due fall 2018    ENDOMETRIAL ABLATION  2008    EXCISE MASS BACK N/A 12/6/2021    Procedure: EXCISION, MASS, BACK;  Surgeon: Norris Vela MD;  Location: [facility] OR    HYSTERECTOMY TOTAL ABDOMINAL, BILATERAL SALPINGO-OOPHORECTOMY, NODE DISSECTION, COMBINED Left 7/7/2017    Procedure: COMBINED HYSTERECTOMY TOTAL ABDOMINAL,  SALPINGO-OOPHORECTOMY, NODE DISSECTION;  Exploratory Laparotomy, Left Salpingo-Oophorectomy, Cancer Staging, Total Hysterectomy, Omentectomy, Evacuation of abdominal fluid, Lymph Node Dissection  Anesthesia Block ;  Surgeon: Serena Cole MD;  Location: [facility] OR    HYSTERECTOMY, PAP NO LONGER INDICATED  07/07/2017    Laparotomy; for ovarian cancer staging    HYSTERECTOMY, PAP NO LONGER INDICATED      INSERT PORT VASCULAR ACCESS Right 8/21/2017    Procedure: INSERT PORT VASCULAR ACCESS;  Single Lumen Chest Power Port;  Surgeon: Stephen Mike PA-C;  Location: [facility]    IR PORT REMOVAL RIGHT  11/12/2021    LYMPHADENECTOMY RETROPERITONEAL Bilateral 07/07/2017    Laparotomy; pelvics & para-aortics; for ovarian cancer staging    OMENTECTOMY  07/07/2017    Laparotomy; for ovarian cancer staging    ORTHOPEDIC SURGERY  1/2002    broken left jaw due to fall, 4 fractures, titanium plates    OTHER SURGICAL HISTORY  01/2002    OTHER SURGICAL HISTORYfacial surgery due to fall     PHACOEMULSIFICATION CLEAR CORNEA WITH STANDARD INTRAOCULAR LENS IMPLANT Right 7/13/2018    Procedure: PHACOEMULSIFICATION CLEAR CORNEA WITH STANDARD INTRAOCULAR LENS IMPLANT;  RIght Eye Phacoemulsification Clear Cornea with Standard Intraocular Lens Placement ;  Surgeon: Mary Jo Massey MD;  [facility]    PHACOEMULSIFICATION CLEAR CORNEA WITH TORIC INTRAOCULAR LENS IMPLANT Left 5/18/2018    Procedure: PHACOEMULSIFICATION CLEAR CORNEA WITH TORIC INTRAOCULAR LENS IMPLANT;  Left Eye Phacoemulsification with Toric Lens;  Surgeon: Mary Jo Massey MD;  [facility]    REMOVE PORT VASCULAR ACCESS Right 11/12/2021    Procedure: REMOVAL, VASCULAR ACCESS PORT right;  Surgeon: Afsaneh Das PA-C;  Location: [facility] OR    SALPINGO OOPHORECTOMY,R/L/KAYY Right 2008    Salpingo Oophorectomy, RT    SALPINGO OOPHORECTOMY,R/L/KAYY Left 07/07/2017    Laparotomy; for ovarian cancer staging    SALPINGOOPHORECTOMY  2008    SURGICAL HISTORY OF -        facial surgery d/t fall in 1/2002    SURGICAL HISTORY OF -       1985 removal of breast cyst    SURGICAL HISTORY OF -   2008    endometrial ablation    YAG CAPSULOTOMY OD (RIGHT EYE)  10/22/2018         Health Maintenance Due   Topic Date Due    RSV VACCINE (1 - Risk 60-74 years 1-dose series) Never done    EYE EXAM  11/28/2019    DIABETIC FOOT EXAM  08/01/2024    COLORECTAL CANCER SCREENING  01/06/2025    ZOSTER VACCINE (2 of 2) 05/30/2025       Current Medications:     Current Outpatient Medications   Medication Sig Dispense Refill    Acetaminophen (TYLENOL PO) Take 500 mg by mouth as needed for mild pain. 2 tabs prn pain (monthly)      atorvastatin (LIPITOR) 80 MG tablet TAKE 1 TABLET(80 MG) BY MOUTH DAILY 90 tablet 4    B Complex Vitamins (VITAMIN B-COMPLEX PO) Take 50 mg by mouth       CALCIUM-MAGNESIUM-VITAMIN D PO Take 2 tablets by mouth daily      Cholecalciferol (VITAMIN D) 1000 UNIT capsule Take 2,000 Units by mouth daily       clopidogrel (PLAVIX) 75 MG tablet Take 1 tablet (75 mg) by mouth daily 90 tablet 3    EPINEPHrine (ANY BX GENERIC EQUIV) 0.3 MG/0.3ML injection 2-pack Inject 0.3 mLs (0.3 mg) into the muscle once as needed for anaphylaxis. 0.3 mL PRN    fish oil-omega-3 fatty acids 500 MG capsule Take by mouth.      fluticasone (FLONASE) 50 MCG/ACT nasal spray SHAKE LIQUID AND USE 2 SPRAYS IN EACH NOSTRIL DAILY 16 g 12    Magnesium Hydroxide (MAGNESIA PO) Take 500 mg by mouth      melatonin 5 MG tablet Take 1 tablet (5 mg) by mouth nightly as needed for sleep. 90 tablet 4    metFORMIN (GLUCOPHAGE XR) 500 MG 24 hr tablet TAKE 2 TABLETS BY MOUTH EVERY DAY WITH THE EVENING MEAL 180 tablet 3    Multiple Vitamins-Minerals (MULTIVITAMIN ADULT PO) Take 1 tablet by mouth daily      nitroFURantoin macrocrystal-monohydrate (MACROBID) 100 MG capsule TAKE 1 CAPSULE BY MOUTH AFTER INTERCOURSE AS NEEDED 30 capsule 5    potassium aminobenzoate 500 MG CAPS capsule Take by mouth.      Probiotic Product (PRO-BIOTIC  BLEND PO) Take 1 capsule by mouth daily Enzymatic Therapy-probiotic pearls      psyllium (METAMUCIL/KONSYL) 58.6 % powder Take by mouth daily.      phenazopyridine (PYRIDIUM) 200 MG tablet Take 1 tablet (200 mg) by mouth 3 times daily as needed for irritation (Patient not taking: Reported on 2025) 9 tablet 0         Allergies:        Allergies   Allergen Reactions    Bee Venom Anaphylaxis and Swelling     Wasps, Yellow Jackets and Hornets    Paclitaxel Anaphylaxis and Difficulty breathing    Wasps [Hornets] Anaphylaxis    Yellow Hornet Venom [Hornet Venom] Anaphylaxis and Swelling    Yellow Jacket Venom [Honey Bee Venom] Anaphylaxis and Swelling    Sulfa Antibiotics Hives and Rash        Social History:     Social History     Tobacco Use    Smoking status: Never     Passive exposure: Never    Smokeless tobacco: Never   Substance Use Topics    Alcohol use: Yes     Comment: Very infrequent, a bit of wine or beer 2x per month maybe       History   Drug Use    Types: Marijuana     Comment: infrequent, for celebrations only, maybe 8x per year         Family History:     The patient's family history is notable for:    Family History   Problem Relation Age of Onset    C.A.D. Mother     Hypertension Mother     Breast Cancer Mother     Psychotic Disorder Mother     Colon Cancer Mother     Cancer Mother         Bone cancer    Coronary Artery Disease Mother     Hyperlipidemia Mother         treated w. statins    Other Cancer Mother         bone/marrow, ,  of this    Depression Mother     Anxiety Disorder Mother     Mental Illness Mother         various undiagnosed types, but THERE    Obesity Mother     Bone Cancer Mother     Other - See Comments Mother         psychotic disease     C.A.D. Father     Diabetes Father     Hypertension Father     Cerebrovascular Disease Father         1984 or     Psychotic Disorder Father     Pancreatic Cancer Father     Coronary Artery Disease Father     Hyperlipidemia Father          treated w statins    Other Cancer Father         pancreatic, ,  of this    Depression Father     Mental Illness Father         likely PTSD and depression    Obesity Father     Other - See Comments Father         cerebrovascular disease, psychotic disease     Psychotic Disorder Sister         x2    Neurologic Disorder Sister     Hypertension Sister     Hyperlipidemia Sister         treated w statins    Depression Sister     Anxiety Disorder Sister     Obesity Sister     Rashes/Skin Problems Sister         precancerous lesion on leg    Psychotic Disorder Sister     Other - See Comments Sister         small kidney stone     Hypertension Sister     Hyperlipidemia Sister         treated w statins    Depression Sister     Anxiety Disorder Sister     Other - See Comments Sister         psychotic disease     Prostate Problems Brother         cleared     Hypertension Brother     Hyperlipidemia Brother         unsure if treated w statins    Depression Brother     Anxiety Disorder Brother     Mental Illness Brother         undiagnosed but THERE    Obesity Brother     Pacemaker Brother 70        bradycardia, sick sinus syndrome    Other - See Comments Brother         Benign tumor removed from his small intestine    Psychotic Disorder Brother         x2    Depression Brother         major depressive disorder and OCD    Anxiety Disorder Brother     Mental Illness Brother         not sure of diagnoses, treated    Hypertension Brother     Hyperlipidemia Brother     Prostate Cancer Brother     Depression Brother     Anxiety Disorder Brother     Other - See Comments Brother         psychotic disease     Psychotic Disorder Maternal Grandmother         ?    Coronary Artery Disease Maternal Grandmother          of heart attack age 84?    Depression Maternal Grandmother     Psychotic Disorder Maternal Grandfather         ?    Psychotic Disorder Paternal Grandmother         Schizophernia    Coronary Artery Disease  Paternal Grandmother          of heart attack, age 85?    Depression Paternal Grandmother         severe, but recovered    Mental Illness Paternal Grandmother         possible bipolar or other    Schizophrenia Paternal Grandmother     Psychotic Disorder Paternal Grandfather         ?    Respiratory Paternal Grandfather          of emphysema and smoking    Emphysema Paternal Grandfather     Cancer - colorectal No family hx of     Glaucoma No family hx of     Macular Degeneration No family hx of          Physical Exam:     /79 (BP Location: Right arm, Patient Position: Sitting, Cuff Size: Adult Regular)   Pulse 88   Temp 98.4  F (36.9  C) (Oral)   Resp 18   Wt 88.9 kg (195 lb 14.4 oz)   LMP 2009   SpO2 96%   BMI 35.91 kg/m    Body mass index is 35.91 kg/m .    General Appearance:  alert, no distress     HEENT: no palpable nodules or masses        Cardiovascular: regular rate and rhythm, no gallops, rubs or murmurs     Respiratory: lungs clear, no rales, rhonchi or wheezes    Musculoskeletal: extremities non tender and without edema    Skin: no lesions or rashes     Neurological: normal gait, no gross defects     Psychiatric: appropriate mood and affect                               Hematological: normal cervical, supraclavicular and inguinal lymph nodes     Gastrointestinal:       abdomen soft, non-tender, non-distended, no organomegaly or masses    Genitourinary: External genitalia and urethral meatus appears normal.  Vagina is smooth without nodularity or masses.  Cervix is surgically absent.  Bimanual exam reveal no masses, nodularity or fullness.  Recto-vaginal exam confirms these findings.      No results found for this or any previous visit (from the past 24 hours).         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 She completed treatment chemotherapy on 10/16/17. She is here today a surveillance visit.     40  minutes spent on the date of the encounter doing chart review, history and exam, documentation, and further activities as noted above.      Plan:     1.)        Ovarian cancer:  ASHISH on exam,  is low and stable.  RTC in 1 year for next surveillance visit and  level.  She will continue to need an annual pelvic exam and  level.  OK to use topical estrogen, research does not has not shown an increased risk for recurrence with clear cell ovarian cancer.  Message sent to Dr. Shaffer for input on risk for recurrence 8 years post treatment.Reviewed signs and symptoms for when she should contact the clinic or seek additional care.  Patient to contact the clinic with any questions or concerns in the interim.      Genetic risk factors were assessed and she is negative for mutations in the AIP, ALK, APC, COSTA, BAP1, BARD1, BLM, BRCA1, BRCA2, BRIP1, BMPR1A, CDH1, CDK4, CDKN1B, CDKN2A, CHEK2, DICER1, EPCAM, FANCC, FH, FLCN, GALNT12, GREM1, HOXB13, MAX, MEN1, MET, MITF, MLH1, MRE11A, MSH2, MSH6, MUTYH, NBN, NF1, NF2, PALB2, PHOX2B, PMS2, POLD1, POLE, POT1, AIJJS9Q, PTCH1, PTEN, RAD50, RAD51C, RAD51D, RB1, RET, SDHA, SDHAF2, SDHB, SDHC, SDHD, SMAD4, SMARCA4, SMARCB1, SMARCE1, STK11, SUFU, DWXJ053, TP53, TSC1, TSC2, VHL, and XRCC2 genes.     Labs and/or tests ordered include:  .                2.)        Health maintenance:  Issues addressed today include following up with PCP for annual health maintenance and non-gynecologic issues.       3.)        Left leg lymphedema:  History of LLE DVT and lymphedema.  Symptoms are stable to improved.  Continue current treatment.  Could consider lymphedema referral if symptoms worsen.        Jazzy William, DNP, APRN, FNP-C, AOCNP  Oncology Nurse Practitioner  Division of Gynecologic Oncology  Pager: [phone]     CC  Patient Care Team:  Morelia Ayon NP as PCP - Karla Perez RN as Continuity Care Coordinator (Gynecologic Oncology)  Nabeel Dick  MD MITALI as MD (Urology)  Jg, Javier Street MD as MD (Urology)  Lola Vasquez, PhD LP as Psychologist (Psychology)  Rogelio, Leland Bingham MD as MD (Urology)  Norris Vela MD as MD (Critical Care)  Morelia Ayon NP as Referring Physician (Nurse Practitioner - Family)  Andreia Rosales PA-C as Physician Assistant (Dermatology)  Morelia Ayon NP as Assigned PCP  Chandrika Byers CNP as Nurse Practitioner (Urology)  Kim Patel APRN CNP as Assigned Cancer Care Provider  Chandrika Byers CNP as Assigned Surgical Provider  Kristal Lamar as Assigned Behavioral Health Provider  SELF, REFERRED

## 2025-06-05 ENCOUNTER — ONCOLOGY VISIT (OUTPATIENT)
Dept: ONCOLOGY | Facility: CLINIC | Age: 70
End: 2025-06-05
Attending: NURSE PRACTITIONER
Payer: MEDICARE

## 2025-06-05 VITALS
TEMPERATURE: 98.4 F | SYSTOLIC BLOOD PRESSURE: 112 MMHG | OXYGEN SATURATION: 96 % | WEIGHT: 195.9 LBS | RESPIRATION RATE: 18 BRPM | HEART RATE: 88 BPM | DIASTOLIC BLOOD PRESSURE: 79 MMHG | BODY MASS INDEX: 35.91 KG/M2

## 2025-06-05 DIAGNOSIS — Z08 ENCOUNTER FOR FOLLOW-UP SURVEILLANCE OF OVARIAN CANCER: ICD-10-CM

## 2025-06-05 DIAGNOSIS — Z85.43 ENCOUNTER FOR FOLLOW-UP SURVEILLANCE OF OVARIAN CANCER: ICD-10-CM

## 2025-06-05 DIAGNOSIS — C56.2 OVARIAN CANCER, LEFT (H): Primary | ICD-10-CM

## 2025-06-05 PROCEDURE — G0463 HOSPITAL OUTPT CLINIC VISIT: HCPCS | Performed by: NURSE PRACTITIONER

## 2025-06-05 ASSESSMENT — PAIN SCALES - GENERAL: PAINLEVEL_OUTOF10: NO PAIN (0)

## 2025-06-05 NOTE — LETTER
2025      Hafsa Corona  [address]  [place] [address]      Dear Colleague,    Thank you for referring your patient, Hafsa Corona, to the [facility]. Please see a copy of my visit note below.    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 She completed treatment chemotherapy on 10/16/17. She is here today a surveillance visit.       Oncology History:  The patient has had a recent episode of lower abdominal pain in early July.  She was subsequently sent to the emergency room and was found to have a large 9 cm complex ovarian mass. She was admitted to the hospital for pain control.     Presented 2017 with LLE pain and swelling, found to have a distal DVT  2017 switched from warfarin to rivaroxaban, plan for 3 months of therapy and consider duration of therapy     7/3/17:  1187   17: Exploratory laparotomy, total abdominal hysterectomy, left salpingo-oophorectomy, cancer staging including infracolic omentectomy, bilateral pelvic & para-aortic lymphadenectomy, peritoneal biopsies, evacuation of pelvic fluid by Dr. Cole.    FINAL DIAGNOSIS:   A. LEFT OVARY AND FALLOPIAN TUBE, LEFT SALPINGO-OOPHORECTOMY:   - Ovarian clear cell carcinoma   - Background of endometriosis   - Fallopian tube with no significant histologic abnormality.   B. UTERUS, HYSTERECTOMY:   -  Inactive endometrium   -  Myometrium with adenomyosis and leiomyoma.   -  Cervix with squamous metaplasia.   C. PERITONEUM, RIGHT PELVIS, BIOPSY:   - Fibroadipose tissue, negative for malignancy.   D. LYMPH NODES, RIGHT PELVIC, DISSECTION:   - Thirteen benign lymph nodes (0/13).   E. LYMPH NODES, LEFT PELVIC, DISSECTION:   - Seven benign lymph nodes (0/7).    F. PERITONEUM, LEFT PELVIS, BIOPSY:   - Fibroadipose tissue, negative for malignancy.   G. PERITONEUM, BLADDER, BIOPSY:   - Fibroadipose tissue  with acute and chronic inflammation, negative for malignancy.   H. PERITONEUM, POSTERIOR CUL-DE-SAC, BIOPSY:   - Fibroadipose tissue, negative for malignancy.   I. PERITONEUM, LEFT PARACOLIC GUTTER, BIOPSY:   - Fibroadipose tissue, negative for malignancy.   J. LYMPH NODES, LEFT PARA-AORTIC, DISSECTION:   - Five benign lymph nodes (0/5).   K. LYMPH NODES, RIGHT PARA-AORTIC, DISSECTION:   - Two benign lymph nodes (0/2).   L. PERITONEUM, RIGHT PARACOLIC GUTTER, BIOPSY:   - Fibroadipose tissue, negative for malignancy.   M. OMENTUM, OMENTECTOMY:   - Adipose tissue with reactive changes, negative for malignancy.          07/31/17: Dr Shaffer follow up: Discussed with the patient that given the high risk histology, as well as ruptured mass before surgery, she would be qualified at higher risk of recurrence despite early stage ovarian cancer.  Recommended adjuvant chemotherapy. Plan for carboplatin of AUC of 6 and paclitaxel of 175, m2 for 3 cycles. Referral for genetic counselor and will see her back after those 3 cycles  08/03/17: Call from patient stating she is going for a second opinion and would like chemotherapy rescheduled to week of 8/14/17.      08/16/17: Cycle #1 Carbo/Taxol.  73. Significant paclitaxel reaction.  08/30/17: C1 carboplatin/inpatient paclitaxel desensitization.   09/25/17: C2 carboplatin/paclitaxel- inpatient paclitaxel desensitization.  15.  10/16/17: C3 carboplatin/paclitaxel- switched to docetaxel given her neuropathy.  10.  11/14/17:  8. CT CAP:  IMPRESSION:   1. Post surgical changes of hysterectomy and bilateral  salpingo-oophorectomy. Nodular soft tissue density in the posterior  cul-de-sac along with ill-defined nodular thickening in the left  pelvis, suspicious for residual disease or metastatic deposits.   2. There  is a 3 cm mass in the superior pole of the right kidney,  possibly extending into the renal hilum. Represents RCC until proven  otherwise. Consider renal mass protocol CT to assess renal vein  involvement.  3. Stable sub-4 mm pulmonary nodules, continued attention on  follow-up.     11/16/17: CT renal mass protocol  IMPRESSION:  1. Arterially enhancing mass measuring 3.6 x 2.1 x 2.2 cm mass arising  from superior posterior of the right kidney, this is renal cell  carcinoma until proven otherwise.  2. Stable fat-containing umbilical hernia.     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 Margins negative. Three month surveillance plan with [facility].     2/26/18:  14  5/22/18:  11   8/23/18:  11  12/12/18:  10  3/8/19:  12  6/19/19:  13  9/23/19:  12     11/8/19: CT Chest:   IMPRESSION:   1. Stable, sub-4 mm pulmonary nodules as above.  2. Stable hypodense, subcentimeter nodules in the thyroid.     3/27/20:  14  10/1/20:  15  3/29/21:  10     7/18/21 CT A/P for abdominal pain in ER: IMPRESSION:   1.  No acute process in the abdomen or pelvis.  2.  Colonic diverticulosis without evidence for diverticulitis.  3.  Post right nephrectomy, hysterectomy, and oophorectomy     10/5/2021:  13  4/1/22:  13  10/21/22:  15     5/23/2023 [facility] ER for left leg swelling and redness. Imaging:  US Lower Extremity Venous Duplex Left  Final Result  IMPRESSION:  1.  No deep venous thrombosis in the left lower extremity.  2.  Superficial thrombophlebitis of greater saphenous vein.     6/1/2023:  16.  6/4/24:  12.  6/2/25:  16.              Today she comes to clinic feeling well from a gynecologic perspective.  She did recently lose 2 dogs and is currently grieving.  She has a therapist she likes.    -Vaginal bleeding/discharge: No  -Abdominal pain/bloating: No  -Appetite: Normal  -Weight loss:  No  -Bowel/bladder habits: Some bowel changes but tied to foods, probiotics, a couple utis saw urology, asks about topical estrogen  -Leg swelling: She also continues with left leg lymphedema. Better recently   -She asks about her risk for recurrence at this point.          Health Maintenance  Colonoscopy: 1/6/2020, repeat in 5 years  Mammogram: 10/29/24  Annual physical: 3/3/25    Past Medical History:    Past Medical History:   Diagnosis Date     Anxiety state, unspecified     [id number]     Arthritis 2014    vague, gradual, not treated really, knees feel it     At high risk for breast cancer 09/27/2019    30.3% lifetime risk by SHERON model     Attention deficit disorder without mention of hyperactivity      Cancer (H) 7/2017 and 1/2018    ovarian and kidney cancers, both treated well     Chronic rhinitis      Depressive disorder lifetime    plus Anxiety plus ADD. Escitalipram, therapy, ADD meds maybe     Heart disease 1999    tachycardia and high cholesterol, managed     History of blood transfusion 07/2017    while in hospital for ovarian cancer     Hypertension 1999    tho BP was too LOW last week. past 12 years Generally OK     Panic disorder without agoraphobia      Pure hypercholesterolemia     Lipitor     Tachycardia, unspecified     9/1999-2/2004     Type II or unspecified type diabetes mellitus without mention of complication, not stated as uncontrolled     diagnosed 2004         Past Surgical History:    Past Surgical History:   Procedure Laterality Date     ABSCESS DRAINAGE Left     left leg      AS REMV KIDNEY,RADICAL Right      BIOPSY  7/2017 and 2/2018    ovarian and kidney cancer biopsies. also 1987 breast benign     BREAST CYST EXCISION  1985     BREAST SURGERY  1987    date unsure. benign breast cyst removed     CATARACT IOL, RT/LT Left 05/18/2018     CATARACT IOL, RT/LT Right 07/1318     COLONOSCOPY  2008 and 2013    polyps removed. Next due fall 2018     ENDOMETRIAL ABLATION  2008     EXCISE MASS  BACK N/A 12/6/2021    Procedure: EXCISION, MASS, BACK;  Surgeon: Norris Vela MD;  Location: [facility] OR     HYSTERECTOMY TOTAL ABDOMINAL, BILATERAL SALPINGO-OOPHORECTOMY, NODE DISSECTION, COMBINED Left 7/7/2017    Procedure: COMBINED HYSTERECTOMY TOTAL ABDOMINAL, SALPINGO-OOPHORECTOMY, NODE DISSECTION;  Exploratory Laparotomy, Left Salpingo-Oophorectomy, Cancer Staging, Total Hysterectomy, Omentectomy, Evacuation of abdominal fluid, Lymph Node Dissection  Anesthesia Block ;  Surgeon: Serena Cole MD;  Location: [facility] OR     HYSTERECTOMY, PAP NO LONGER INDICATED  07/07/2017    Laparotomy; for ovarian cancer staging     HYSTERECTOMY, PAP NO LONGER INDICATED       INSERT PORT VASCULAR ACCESS Right 8/21/2017    Procedure: INSERT PORT VASCULAR ACCESS;  Single Lumen Chest Power Port;  Surgeon: Stephen Mike PA-C;  [facility]     IR PORT REMOVAL RIGHT  11/12/2021     LYMPHADENECTOMY RETROPERITONEAL Bilateral 07/07/2017    Laparotomy; pelvics & para-aortics; for ovarian cancer staging     OMENTECTOMY  07/07/2017    Laparotomy; for ovarian cancer staging     ORTHOPEDIC SURGERY  1/2002    broken left jaw due to fall, 4 fractures, titanium plates     OTHER SURGICAL HISTORY  01/2002    OTHER SURGICAL HISTORYfacial surgery due to fall      PHACOEMULSIFICATION CLEAR CORNEA WITH STANDARD INTRAOCULAR LENS IMPLANT Right 7/13/2018    Procedure: PHACOEMULSIFICATION CLEAR CORNEA WITH STANDARD INTRAOCULAR LENS IMPLANT;  RIght Eye Phacoemulsification Clear Cornea with Standard Intraocular Lens Placement ;  Surgeon: Mary Jo Massey MD;  [facility]     PHACOEMULSIFICATION CLEAR CORNEA WITH TORIC INTRAOCULAR LENS IMPLANT Left 5/18/2018    Procedure: PHACOEMULSIFICATION CLEAR CORNEA WITH TORIC INTRAOCULAR LENS IMPLANT;  Left Eye Phacoemulsification with Toric Lens;  Surgeon: Mary Jo Massey MD;  [facility]     REMOVE PORT VASCULAR ACCESS Right 11/12/2021    Procedure: REMOVAL, VASCULAR ACCESS PORT  right;  Surgeon: Afsaneh Das PA-C;  Location: [facility] OR     SALPINGO OOPHORECTOMY,R/L/KAYY Right 2008    Salpingo Oophorectomy, RT     SALPINGO OOPHORECTOMY,R/L/KAYY Left 07/07/2017    Laparotomy; for ovarian cancer staging     SALPINGOOPHORECTOMY  2008     SURGICAL HISTORY OF -       facial surgery d/t fall in 1/2002     SURGICAL HISTORY OF -       1985 removal of breast cyst     SURGICAL HISTORY OF -   2008    endometrial ablation     YAG CAPSULOTOMY OD (RIGHT EYE)  10/22/2018         Health Maintenance Due   Topic Date Due     RSV VACCINE (1 - Risk 60-74 years 1-dose series) Never done     EYE EXAM  11/28/2019     DIABETIC FOOT EXAM  08/01/2024     COLORECTAL CANCER SCREENING  01/06/2025     ZOSTER VACCINE (2 of 2) 05/30/2025       Current Medications:     Current Outpatient Medications   Medication Sig Dispense Refill     Acetaminophen (TYLENOL PO) Take 500 mg by mouth as needed for mild pain. 2 tabs prn pain (monthly)       atorvastatin (LIPITOR) 80 MG tablet TAKE 1 TABLET(80 MG) BY MOUTH DAILY 90 tablet 4     B Complex Vitamins (VITAMIN B-COMPLEX PO) Take 50 mg by mouth        CALCIUM-MAGNESIUM-VITAMIN D PO Take 2 tablets by mouth daily       Cholecalciferol (VITAMIN D) 1000 UNIT capsule Take 2,000 Units by mouth daily        clopidogrel (PLAVIX) 75 MG tablet Take 1 tablet (75 mg) by mouth daily 90 tablet 3     EPINEPHrine (ANY BX GENERIC EQUIV) 0.3 MG/0.3ML injection 2-pack Inject 0.3 mLs (0.3 mg) into the muscle once as needed for anaphylaxis. 0.3 mL PRN     fish oil-omega-3 fatty acids 500 MG capsule Take by mouth.       fluticasone (FLONASE) 50 MCG/ACT nasal spray SHAKE LIQUID AND USE 2 SPRAYS IN EACH NOSTRIL DAILY 16 g 12     Magnesium Hydroxide (MAGNESIA PO) Take 500 mg by mouth       melatonin 5 MG tablet Take 1 tablet (5 mg) by mouth nightly as needed for sleep. 90 tablet 4     metFORMIN (GLUCOPHAGE XR) 500 MG 24 hr tablet TAKE 2 TABLETS BY MOUTH EVERY DAY WITH THE EVENING MEAL 180 tablet 3      Multiple Vitamins-Minerals (MULTIVITAMIN ADULT PO) Take 1 tablet by mouth daily       nitroFURantoin macrocrystal-monohydrate (MACROBID) 100 MG capsule TAKE 1 CAPSULE BY MOUTH AFTER INTERCOURSE AS NEEDED 30 capsule 5     potassium aminobenzoate 500 MG CAPS capsule Take by mouth.       Probiotic Product (PRO-BIOTIC BLEND PO) Take 1 capsule by mouth daily Enzymatic Therapy-probiotic pearls       psyllium (METAMUCIL/KONSYL) 58.6 % powder Take by mouth daily.       phenazopyridine (PYRIDIUM) 200 MG tablet Take 1 tablet (200 mg) by mouth 3 times daily as needed for irritation (Patient not taking: Reported on 2025) 9 tablet 0         Allergies:        Allergies   Allergen Reactions     Bee Venom Anaphylaxis and Swelling     Wasps, Yellow Jackets and Hornets     Paclitaxel Anaphylaxis and Difficulty breathing     Wasps [Hornets] Anaphylaxis     Yellow Hornet Venom [Hornet Venom] Anaphylaxis and Swelling     Yellow Jacket Venom [Honey Bee Venom] Anaphylaxis and Swelling     Sulfa Antibiotics Hives and Rash        Social History:     Social History     Tobacco Use     Smoking status: Never     Passive exposure: Never     Smokeless tobacco: Never   Substance Use Topics     Alcohol use: Yes     Comment: Very infrequent, a bit of wine or beer 2x per month maybe       History   Drug Use     Types: Marijuana     Comment: infrequent, for celebrations only, maybe 8x per year         Family History:     The patient's family history is notable for:    Family History   Problem Relation Age of Onset     C.A.D. Mother      Hypertension Mother      Breast Cancer Mother      Psychotic Disorder Mother      Colon Cancer Mother      Cancer Mother         Bone cancer     Coronary Artery Disease Mother      Hyperlipidemia Mother         treated w. statins     Other Cancer Mother         bone/marrow, ,  of this     Depression Mother      Anxiety Disorder Mother      Mental Illness Mother         various undiagnosed types, but THERE      Obesity Mother      Bone Cancer Mother      Other - See Comments Mother         psychotic disease      C.A.D. Father      Diabetes Father      Hypertension Father      Cerebrovascular Disease Father         1984 or      Psychotic Disorder Father      Pancreatic Cancer Father      Coronary Artery Disease Father      Hyperlipidemia Father         treated w statins     Other Cancer Father         pancreatic, ,  of this     Depression Father      Mental Illness Father         likely PTSD and depression     Obesity Father      Other - See Comments Father         cerebrovascular disease, psychotic disease      Psychotic Disorder Sister         x2     Neurologic Disorder Sister      Hypertension Sister      Hyperlipidemia Sister         treated w statins     Depression Sister      Anxiety Disorder Sister      Obesity Sister      Rashes/Skin Problems Sister         precancerous lesion on leg     Psychotic Disorder Sister      Other - See Comments Sister         small kidney stone      Hypertension Sister      Hyperlipidemia Sister         treated w statins     Depression Sister      Anxiety Disorder Sister      Other - See Comments Sister         psychotic disease      Prostate Problems Brother         cleared      Hypertension Brother      Hyperlipidemia Brother         unsure if treated w statins     Depression Brother      Anxiety Disorder Brother      Mental Illness Brother         undiagnosed but THERE     Obesity Brother      Pacemaker Brother 70        bradycardia, sick sinus syndrome     Other - See Comments Brother         Benign tumor removed from his small intestine     Psychotic Disorder Brother         x2     Depression Brother         major depressive disorder and OCD     Anxiety Disorder Brother      Mental Illness Brother         not sure of diagnoses, treated     Hypertension Brother      Hyperlipidemia Brother      Prostate Cancer Brother      Depression Brother      Anxiety Disorder  Brother      Other - See Comments Brother         psychotic disease      Psychotic Disorder Maternal Grandmother         ?     Coronary Artery Disease Maternal Grandmother          of heart attack age 84?     Depression Maternal Grandmother      Psychotic Disorder Maternal Grandfather         ?     Psychotic Disorder Paternal Grandmother         Schizophernia     Coronary Artery Disease Paternal Grandmother          of heart attack, age 85?     Depression Paternal Grandmother         severe, but recovered     Mental Illness Paternal Grandmother         possible bipolar or other     Schizophrenia Paternal Grandmother      Psychotic Disorder Paternal Grandfather         ?     Respiratory Paternal Grandfather          of emphysema and smoking     Emphysema Paternal Grandfather      Cancer - colorectal No family hx of      Glaucoma No family hx of      Macular Degeneration No family hx of          Physical Exam:     /79 (BP Location: Right arm, Patient Position: Sitting, Cuff Size: Adult Regular)   Pulse 88   Temp 98.4  F (36.9  C) (Oral)   Resp 18   Wt 88.9 kg (195 lb 14.4 oz)   LMP 2009   SpO2 96%   BMI 35.91 kg/m    Body mass index is 35.91 kg/m .    General Appearance:  alert, no distress     HEENT: no palpable nodules or masses        Cardiovascular: regular rate and rhythm, no gallops, rubs or murmurs     Respiratory: lungs clear, no rales, rhonchi or wheezes    Musculoskeletal: extremities non tender and without edema    Skin: no lesions or rashes     Neurological: normal gait, no gross defects     Psychiatric: appropriate mood and affect                               Hematological: normal cervical, supraclavicular and inguinal lymph nodes     Gastrointestinal:       abdomen soft, non-tender, non-distended, no organomegaly or masses    Genitourinary: External genitalia and urethral meatus appears normal.  Vagina is smooth without nodularity or masses.  Cervix is surgically absent.   Bimanual exam reveal no masses, nodularity or fullness.  Recto-vaginal exam confirms these findings.      No results found for this or any previous visit (from the past 24 hours).         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 She completed treatment chemotherapy on 10/16/17. She is here today a surveillance visit.     40 minutes spent on the date of the encounter doing chart review, history and exam, documentation, and further activities as noted above.      Plan:     1.)        Ovarian cancer:  ASHISH on exam,  is low and stable.  RTC in 1 year for next surveillance visit and  level.  She will continue to need an annual pelvic exam and  level.  OK to use topical estrogen, research does not has not shown an increased risk for recurrence with clear cell ovarian cancer.  Message sent to Dr. Shaffer for input on risk for recurrence 8 years post treatment.Reviewed signs and symptoms for when she should contact the clinic or seek additional care.  Patient to contact the clinic with any questions or concerns in the interim.      Genetic risk factors were assessed and she is negative for mutations in the AIP, ALK, APC, COSTA, BAP1, BARD1, BLM, BRCA1, BRCA2, BRIP1, BMPR1A, CDH1, CDK4, CDKN1B, CDKN2A, CHEK2, DICER1, EPCAM, FANCC, FH, FLCN, GALNT12, GREM1, HOXB13, MAX, MEN1, MET, MITF, MLH1, MRE11A, MSH2, MSH6, MUTYH, NBN, NF1, NF2, PALB2, PHOX2B, PMS2, POLD1, POLE, POT1, YBJKM6Q, PTCH1, PTEN, RAD50, RAD51C, RAD51D, RB1, RET, SDHA, SDHAF2, SDHB, SDHC, SDHD, SMAD4, SMARCA4, SMARCB1, SMARCE1, STK11, SUFU, TMYW965, TP53, TSC1, TSC2, VHL, and XRCC2 genes.     Labs and/or tests ordered include:  .                2.)        Health maintenance:  Issues addressed today include following up with PCP for annual health maintenance and non-gynecologic issues.       3.)        Left leg lymphedema:  History of LLE DVT and lymphedema.   Symptoms are stable to improved.  Continue current treatment.  Could consider lymphedema referral if symptoms worsen.        Jazzy William, DONNELL, APRN, FNP-C, AOCNP  Oncology Nurse Practitioner  Division of Gynecologic Oncology  Pager: [phone]     CC  Patient Care Team:  Morelia Ayon NP as PCP - General  Karla Oswald RN as Continuity Care Coordinator (Gynecologic Oncology)  Nabeel Dick MD as MD (Urology)  Jg, Javier Street MD as MD (Urology)  Lola Vasquez, PhD LP as Psychologist (Psychology)  Leland Mercado MD as MD (Urology)  Norris Vela MD as MD (Critical Care)  Morelia Ayon NP as Referring Physician (Nurse Practitioner - Family)  Andreia Rosales PA-C as Physician Assistant (Dermatology)  Morelia Ayon NP as Assigned PCP  Chandrika Byers CNP as Nurse Practitioner (Urology)  Kim Patel APRN CNP as Assigned Cancer Care Provider  Chandrika Byers CNP as Assigned Surgical Provider  Kristal Lamar as Assigned Behavioral Health Provider  SELF, REFERRED    Again, thank you for allowing me to participate in the care of your patient.        Sincerely,        MARLON Panda CNP    Electronically signed

## 2025-06-05 NOTE — NURSING NOTE
"Oncology Rooming Note    June 5, 2025 11:28 AM   Hafsa Corona is a 70 year old female who presents for:    Chief Complaint   Patient presents with    Oncology Clinic Visit     Ovarian Cancer     Initial Vitals: /79 (BP Location: Right arm, Patient Position: Sitting, Cuff Size: Adult Regular)   Pulse 88   Temp 98.4  F (36.9  C) (Oral)   Resp 18   Wt 88.9 kg (195 lb 14.4 oz)   LMP 01/01/2009   SpO2 96%   BMI 35.91 kg/m   Estimated body mass index is 35.91 kg/m  as calculated from the following:    Height as of 3/3/25: 1.573 m (5' 1.93\").    Weight as of this encounter: 88.9 kg (195 lb 14.4 oz). Body surface area is 1.97 meters squared.  No Pain (0) Comment: Data Unavailable   Patient's last menstrual period was 01/01/2009.  Allergies reviewed: Yes  Medications reviewed: Yes    Medications: Medication refills not needed today.  Pharmacy name entered into EPIC:    [id number] - [place], [place] - [address] AT [facility] [address] [place] [address]  [id number] - [place] - [address] [facility] [address]  [facility] [place], [place] - [address] [facility] [phone]  [facility] [place], [place] - [address]  [id number] - [place], [place] - [address] AT [facility] [address]  [facility] [address] [facility] [id number] - [place], [place] - [facility] [place], [place] - [address]  [facility] - [place], [place] - [address] [facility]    Frailty Screening:   Is the patient here for a new oncology consult visit in cancer care? 2. No    PHQ9:  Did this patient require a PHQ9?: No      Clinical concerns: none.      Bassam Escoto"

## 2025-06-09 ENCOUNTER — ANCILLARY PROCEDURE (OUTPATIENT)
Dept: GENERAL RADIOLOGY | Facility: CLINIC | Age: 70
End: 2025-06-09
Attending: PHYSICIAN ASSISTANT
Payer: MEDICARE

## 2025-06-09 ENCOUNTER — OFFICE VISIT (OUTPATIENT)
Dept: URGENT CARE | Facility: URGENT CARE | Age: 70
End: 2025-06-09
Payer: MEDICARE

## 2025-06-09 VITALS
HEART RATE: 86 BPM | BODY MASS INDEX: 35.88 KG/M2 | SYSTOLIC BLOOD PRESSURE: 112 MMHG | TEMPERATURE: 98.4 F | RESPIRATION RATE: 16 BRPM | DIASTOLIC BLOOD PRESSURE: 72 MMHG | WEIGHT: 195 LBS | OXYGEN SATURATION: 97 % | HEIGHT: 62 IN

## 2025-06-09 DIAGNOSIS — R05.1 ACUTE COUGH: Primary | ICD-10-CM

## 2025-06-09 DIAGNOSIS — Z20.822 EXPOSURE TO 2019 NOVEL CORONAVIRUS: ICD-10-CM

## 2025-06-09 DIAGNOSIS — B34.9 VIRAL SYNDROME: ICD-10-CM

## 2025-06-09 LAB
BASOPHILS # BLD AUTO: 0 10E3/UL (ref 0–0.2)
BASOPHILS NFR BLD AUTO: 0 %
EOSINOPHIL # BLD AUTO: 0.1 10E3/UL (ref 0–0.7)
EOSINOPHIL NFR BLD AUTO: 1 %
ERYTHROCYTE [DISTWIDTH] IN BLOOD BY AUTOMATED COUNT: 13.1 % (ref 10–15)
HCT VFR BLD AUTO: 37.3 % (ref 35–47)
HGB BLD-MCNC: 11.9 G/DL (ref 11.7–15.7)
IMM GRANULOCYTES # BLD: 0 10E3/UL
IMM GRANULOCYTES NFR BLD: 0 %
LYMPHOCYTES # BLD AUTO: 1.9 10E3/UL (ref 0.8–5.3)
LYMPHOCYTES NFR BLD AUTO: 28 %
MCH RBC QN AUTO: 27.9 PG (ref 26.5–33)
MCHC RBC AUTO-ENTMCNC: 31.9 G/DL (ref 31.5–36.5)
MCV RBC AUTO: 87 FL (ref 78–100)
MONOCYTES # BLD AUTO: 0.7 10E3/UL (ref 0–1.3)
MONOCYTES NFR BLD AUTO: 10 %
NEUTROPHILS # BLD AUTO: 4.1 10E3/UL (ref 1.6–8.3)
NEUTROPHILS NFR BLD AUTO: 60 %
PLATELET # BLD AUTO: 272 10E3/UL (ref 150–450)
RBC # BLD AUTO: 4.27 10E6/UL (ref 3.8–5.2)
WBC # BLD AUTO: 6.8 10E3/UL (ref 4–11)

## 2025-06-09 PROCEDURE — 3074F SYST BP LT 130 MM HG: CPT | Performed by: PHYSICIAN ASSISTANT

## 2025-06-09 PROCEDURE — 3078F DIAST BP <80 MM HG: CPT | Performed by: PHYSICIAN ASSISTANT

## 2025-06-09 PROCEDURE — 87635 SARS-COV-2 COVID-19 AMP PRB: CPT | Performed by: PHYSICIAN ASSISTANT

## 2025-06-09 PROCEDURE — 85025 COMPLETE CBC W/AUTO DIFF WBC: CPT | Performed by: PHYSICIAN ASSISTANT

## 2025-06-09 PROCEDURE — 71046 X-RAY EXAM CHEST 2 VIEWS: CPT | Mod: TC | Performed by: INTERNAL MEDICINE

## 2025-06-09 PROCEDURE — 99214 OFFICE O/P EST MOD 30 MIN: CPT | Performed by: PHYSICIAN ASSISTANT

## 2025-06-09 PROCEDURE — 1125F AMNT PAIN NOTED PAIN PRSNT: CPT | Performed by: PHYSICIAN ASSISTANT

## 2025-06-09 PROCEDURE — 36415 COLL VENOUS BLD VENIPUNCTURE: CPT | Performed by: PHYSICIAN ASSISTANT

## 2025-06-09 RX ORDER — BENZONATATE 200 MG/1
200 CAPSULE ORAL 3 TIMES DAILY PRN
Qty: 30 CAPSULE | Refills: 0 | Status: SHIPPED | OUTPATIENT
Start: 2025-06-09

## 2025-06-09 RX ORDER — BENZONATATE 200 MG/1
200 CAPSULE ORAL 3 TIMES DAILY PRN
Qty: 30 CAPSULE | Refills: 0 | Status: SHIPPED | OUTPATIENT
Start: 2025-06-09 | End: 2025-06-09

## 2025-06-09 ASSESSMENT — ENCOUNTER SYMPTOMS
COUGH: 1
RHINORRHEA: 1
APPETITE CHANGE: 1
SORE THROAT: 1
FEVER: 0
CHEST TIGHTNESS: 1
NAUSEA: 0
ADENOPATHY: 1
FATIGUE: 1
VOMITING: 0
DIARRHEA: 0
SHORTNESS OF BREATH: 0

## 2025-06-09 ASSESSMENT — PAIN SCALES - GENERAL: PAINLEVEL_OUTOF10: MODERATE PAIN (5)

## 2025-06-09 NOTE — PROGRESS NOTES
Urgent Care Clinic Visit    Chief Complaint   Patient presents with    Urgent Care    Sinus Problem    Throat Problem     Pt in clinic to have eval for lung pain, sinus pain and throat pain.                 6/9/2025     4:48 PM   Additional Questions   Roomed by nayeli

## 2025-06-09 NOTE — PROGRESS NOTES
"SUBJECTIVE:   Hafsa Corona is a 70 year old female presenting with a chief complaint of   Chief Complaint   Patient presents with    Urgent Care    Sinus Problem    Throat Problem     Pt in clinic to have eval for lung pain, sinus pain and throat pain.         She is an established patient of [facility].  Symptoms x 2 days.  Axillary lymphadenopathy.  Last chemotherapy in 2017.  No radiation.  Cough keeping up at night \"a little bit\"          Review of Systems   Constitutional:  Positive for appetite change and fatigue. Negative for fever.   HENT:  Positive for congestion, ear pain, rhinorrhea and sore throat.    Respiratory:  Positive for cough and chest tightness. Negative for shortness of breath.    Cardiovascular:  Negative for chest pain.   Gastrointestinal:  Negative for diarrhea, nausea and vomiting.   Skin:  Negative for rash.   Hematological:  Positive for adenopathy.   All other systems reviewed and are negative.      Past Medical History:   Diagnosis Date    Anxiety state, unspecified     [id number]    Arthritis 2014    vague, gradual, not treated really, knees feel it    At high risk for breast cancer 09/27/2019    30.3% lifetime risk by SHERON model    Attention deficit disorder without mention of hyperactivity     Cancer (H) 7/2017 and 1/2018    ovarian and kidney cancers, both treated well    Chronic rhinitis     Depressive disorder lifetime    plus Anxiety plus ADD. Escitalipram, therapy, ADD meds maybe    Heart disease 1999    tachycardia and high cholesterol, managed    History of blood transfusion 07/2017    while in hospital for ovarian cancer    Hypertension 1999    tho BP was too LOW last week. past 12 years Generally OK    Panic disorder without agoraphobia     Pure hypercholesterolemia     Lipitor    Tachycardia, unspecified     9/1999-2/2004    Type II or unspecified type diabetes mellitus without mention of complication, not stated as uncontrolled     diagnosed 2004     Family History   Problem " Relation Age of Onset    C.A.D. Mother     Hypertension Mother     Breast Cancer Mother     Psychotic Disorder Mother     Colon Cancer Mother     Cancer Mother         Bone cancer    Coronary Artery Disease Mother     Hyperlipidemia Mother         treated w. statins    Other Cancer Mother         bone/marrow, ,  of this    Depression Mother     Anxiety Disorder Mother     Mental Illness Mother         various undiagnosed types, but THERE    Obesity Mother     Bone Cancer Mother     Other - See Comments Mother         psychotic disease     C.A.D. Father     Diabetes Father     Hypertension Father     Cerebrovascular Disease Father         1984 or     Psychotic Disorder Father     Pancreatic Cancer Father     Coronary Artery Disease Father     Hyperlipidemia Father         treated w statins    Other Cancer Father         pancreatic, ,  of this    Depression Father     Mental Illness Father         likely PTSD and depression    Obesity Father     Other - See Comments Father         cerebrovascular disease, psychotic disease     Psychotic Disorder Sister         x2    Neurologic Disorder Sister     Hypertension Sister     Hyperlipidemia Sister         treated w statins    Depression Sister     Anxiety Disorder Sister     Obesity Sister     Rashes/Skin Problems Sister         precancerous lesion on leg    Psychotic Disorder Sister     Other - See Comments Sister         small kidney stone     Hypertension Sister     Hyperlipidemia Sister         treated w statins    Depression Sister     Anxiety Disorder Sister     Other - See Comments Sister         psychotic disease     Prostate Problems Brother         cleared     Hypertension Brother     Hyperlipidemia Brother         unsure if treated w statins    Depression Brother     Anxiety Disorder Brother     Mental Illness Brother         undiagnosed but THERE    Obesity Brother     Pacemaker Brother 70        bradycardia, sick sinus syndrome    Other  - See Comments Brother         Benign tumor removed from his small intestine    Psychotic Disorder Brother         x2    Depression Brother         major depressive disorder and OCD    Anxiety Disorder Brother     Mental Illness Brother         not sure of diagnoses, treated    Hypertension Brother     Hyperlipidemia Brother     Prostate Cancer Brother     Depression Brother     Anxiety Disorder Brother     Other - See Comments Brother         psychotic disease     Psychotic Disorder Maternal Grandmother         ?    Coronary Artery Disease Maternal Grandmother          of heart attack age 84?    Depression Maternal Grandmother     Psychotic Disorder Maternal Grandfather         ?    Psychotic Disorder Paternal Grandmother         Schizophernia    Coronary Artery Disease Paternal Grandmother          of heart attack, age 85?    Depression Paternal Grandmother         severe, but recovered    Mental Illness Paternal Grandmother         possible bipolar or other    Schizophrenia Paternal Grandmother     Psychotic Disorder Paternal Grandfather         ?    Respiratory Paternal Grandfather          of emphysema and smoking    Emphysema Paternal Grandfather     Cancer - colorectal No family hx of     Glaucoma No family hx of     Macular Degeneration No family hx of      Current Outpatient Medications   Medication Sig Dispense Refill    Acetaminophen (TYLENOL PO) Take 500 mg by mouth as needed for mild pain. 2 tabs prn pain (monthly)      atorvastatin (LIPITOR) 80 MG tablet TAKE 1 TABLET(80 MG) BY MOUTH DAILY 90 tablet 4    B Complex Vitamins (VITAMIN B-COMPLEX PO) Take 50 mg by mouth       benzonatate (TESSALON) 200 MG capsule Take 1 capsule (200 mg) by mouth 3 times daily as needed for cough. 30 capsule 0    CALCIUM-MAGNESIUM-VITAMIN D PO Take 2 tablets by mouth daily      Cholecalciferol (VITAMIN D) 1000 UNIT capsule Take 2,000 Units by mouth daily       clopidogrel (PLAVIX) 75 MG tablet Take 1 tablet (75  "mg) by mouth daily 90 tablet 3    EPINEPHrine (ANY BX GENERIC EQUIV) 0.3 MG/0.3ML injection 2-pack Inject 0.3 mLs (0.3 mg) into the muscle once as needed for anaphylaxis. 0.3 mL PRN    fish oil-omega-3 fatty acids 500 MG capsule Take by mouth.      fluticasone (FLONASE) 50 MCG/ACT nasal spray SHAKE LIQUID AND USE 2 SPRAYS IN EACH NOSTRIL DAILY 16 g 12    Magnesium Hydroxide (MAGNESIA PO) Take 500 mg by mouth      melatonin 5 MG tablet Take 1 tablet (5 mg) by mouth nightly as needed for sleep. 90 tablet 4    metFORMIN (GLUCOPHAGE XR) 500 MG 24 hr tablet TAKE 2 TABLETS BY MOUTH EVERY DAY WITH THE EVENING MEAL 180 tablet 3    Multiple Vitamins-Minerals (MULTIVITAMIN ADULT PO) Take 1 tablet by mouth daily      nitroFURantoin macrocrystal-monohydrate (MACROBID) 100 MG capsule TAKE 1 CAPSULE BY MOUTH AFTER INTERCOURSE AS NEEDED 30 capsule 5    phenazopyridine (PYRIDIUM) 200 MG tablet Take 1 tablet (200 mg) by mouth 3 times daily as needed for irritation 9 tablet 0    potassium aminobenzoate 500 MG CAPS capsule Take by mouth.      Probiotic Product (PRO-BIOTIC BLEND PO) Take 1 capsule by mouth daily Enzymatic Therapy-probiotic pearls      psyllium (METAMUCIL/KONSYL) 58.6 % powder Take by mouth daily.       Social History     Tobacco Use    Smoking status: Never     Passive exposure: Never    Smokeless tobacco: Never   Substance Use Topics    Alcohol use: Yes     Comment: Very infrequent, a bit of wine or beer 2x per month maybe       OBJECTIVE  /72   Pulse 86   Temp 98.4  F (36.9  C) (Temporal)   Resp 16   Ht 1.575 m (5' 2\")   Wt 88.5 kg (195 lb)   LMP 01/01/2009   SpO2 97%   BMI 35.67 kg/m      Physical Exam  Vitals and nursing note reviewed.   Constitutional:       Appearance: Normal appearance. She is normal weight.   HENT:      Head: Normocephalic and atraumatic.      Right Ear: Tympanic membrane, ear canal and external ear normal.      Left Ear: Tympanic membrane, ear canal and external ear normal.      " Nose: Nose normal.      Mouth/Throat:      Mouth: Mucous membranes are moist.      Pharynx: Oropharynx is clear.   Eyes:      Extraocular Movements: Extraocular movements intact.      Conjunctiva/sclera: Conjunctivae normal.   Neck:      Comments: I do not appreciate any axillary lymphadenopathy.  Cardiovascular:      Rate and Rhythm: Normal rate and regular rhythm.      Pulses: Normal pulses.      Heart sounds: Normal heart sounds.   Pulmonary:      Effort: Pulmonary effort is normal.      Breath sounds: Normal breath sounds.   Musculoskeletal:      Cervical back: Normal range of motion.   Skin:     General: Skin is warm and dry.      Findings: No rash.   Neurological:      General: No focal deficit present.      Mental Status: She is alert.   Psychiatric:         Mood and Affect: Mood normal.         Behavior: Behavior normal.         Labs:  Results for orders placed or performed in visit on 06/09/25 (from the past 24 hours)   CBC with platelets and differential    Narrative    The following orders were created for panel order CBC with platelets and differential.  Procedure                               Abnormality         Status                     ---------                               -----------         ------                     CBC with platelets and ...[[id number]]                      Final result                 Please view results for these tests on the individual orders.   CBC with platelets and differential   Result Value Ref Range    WBC Count 6.8 4.0 - 11.0 10e3/uL    RBC Count 4.27 3.80 - 5.20 10e6/uL    Hemoglobin 11.9 11.7 - 15.7 g/dL    Hematocrit 37.3 35.0 - 47.0 %    MCV 87 78 - 100 fL    MCH 27.9 26.5 - 33.0 pg    MCHC 31.9 31.5 - 36.5 g/dL    RDW 13.1 10.0 - 15.0 %    Platelet Count 272 150 - 450 10e3/uL    % Neutrophils 60 %    % Lymphocytes 28 %    % Monocytes 10 %    % Eosinophils 1 %    % Basophils 0 %    % Immature Granulocytes 0 %    Absolute Neutrophils 4.1 1.6 - 8.3 10e3/uL     Absolute Lymphocytes 1.9 0.8 - 5.3 10e3/uL    Absolute Monocytes 0.7 0.0 - 1.3 10e3/uL    Absolute Eosinophils 0.1 0.0 - 0.7 10e3/uL    Absolute Basophils 0.0 0.0 - 0.2 10e3/uL    Absolute Immature Granulocytes 0.0 <=0.4 10e3/uL     *Note: Due to a large number of results and/or encounters for the requested time period, some results have not been displayed. A complete set of results can be found in Results Review.       X-Ray was done, my findings are: Xrays reviewed by myself and independently interpreted.  Any significant discrepancies with official radiologic read, patient will be notified.        ASSESSMENT:      ICD-10-CM    1. Acute cough  R05.1 CBC with platelets and differential     XR Chest 2 Views     CBC with platelets and differential     benzonatate (TESSALON) 200 MG capsule      2. Exposure to 2019 novel coronavirus  Z20.822 COVID-19 Virus (Coronavirus) by PCR Nose      3. Viral syndrome  B34.9            Medical Decision Making:    Differential Diagnosis:  URI Adult/Peds:  Bronchitis-viral, Pneumonia, Viral syndrome, Viral upper respiratory illness, and covid    Serious Comorbid Conditions:  Adult:  reviewed    PLAN:  Discussed and recommended CDC guidelines for self isolation.  COVID test pending.    Due to concerns of pneumonia, cxr performed.  Discussed reasons to seek immediate medical attention.  Additionally if no improvement or worsening in one week, may follow up with PCP and/or UC.    Rx for tessalon perles.      Followup:    If not improving or if condition worsens, follow up with your Primary Care Provider, If not improving or if conditions worsens over the next 12-24 hours, go to the Emergency Department    There are no Patient Instructions on file for this visit.

## 2025-06-10 ENCOUNTER — VIRTUAL VISIT (OUTPATIENT)
Dept: BEHAVIORAL HEALTH | Facility: CLINIC | Age: 70
End: 2025-06-10
Payer: MEDICARE

## 2025-06-10 DIAGNOSIS — F43.21 ADJUSTMENT DISORDER WITH DEPRESSED MOOD: Primary | ICD-10-CM

## 2025-06-10 LAB — SARS-COV-2 RNA RESP QL NAA+PROBE: NEGATIVE

## 2025-06-10 PROCEDURE — 90834 PSYTX W PT 45 MINUTES: CPT | Mod: 95 | Performed by: SOCIAL WORKER

## 2025-06-10 NOTE — PROGRESS NOTES
[facility]: Integrated Behavioral Health    Behavioral Health Clinician Progress Note   Mental Health & Addiction Services      Tuesday Gaviota 10, 2025    Patient Name: Hafsa Corona       Service Type:  Individual   Service Location:  [place] / Email (patient reached)   Visit Start Time: 12:00 PM  Visit End Time:  12:40 PM   Session Length: 38 - 52    Attendees: Client   Service Modality: Video Visit:      Provider verified identity through the following two step process.  Patient provided:  Patient photo    Telemedicine Visit: The patient's condition can be safely assessed and treated via synchronous audio and visual telemedicine encounter.      Reason for Telemedicine Visit: Patient has requested telehealth visit    Originating Site (Patient Location): Patient's home    Distant Site (Provider Location): Provider Remote Setting- Home Office    Consent:  The patient/guardian has verbally consented to: the potential risks and benefits of telemedicine (video visit) versus in person care; bill my insurance or make self-payment for services provided; and responsibility for payment of non-covered services.     Patient would like the video invitation sent by:  My Chart    Mode of Communication:  Video Conference via Amwell    Distant Location (Provider):  Off-site    As the provider I attest to compliance with applicable laws and regulations related to telemedicine.   Visit number: 4    [facility] Visit Activities (Refresh list every visit): [facility] Only     Date of Brief Diagnostic Assessment : 5/29/25  Treatment Plan Review Date: NA      DATA:    Extended Session (60+ minutes): No   Interactive Complexity: No   Crisis: No   [facility] Patient: No     Assessments completed:  The following assessments were completed by patient for this visit:  PHQ2: No questionnaires on file.  PHQ9:       6/25/2021     7:24 AM 6/3/2022     1:45 PM 9/14/2022     8:45 AM 7/31/2023     5:26 PM 3/3/2025     2:55 PM 5/14/2025      9:03 AM 5/14/2025    10:01 AM   PHQ-9 SCORE   PHQ-9 Total Score MyChart  0 2 (Minimal depression) 4 (Minimal depression) 8 (Mild depression)  7 (Mild depression)   PHQ-9 Total Score 2 0 2 4 8  7  7        Patient-reported     GAD2:       5/14/2025     9:04 AM 5/14/2025    10:01 AM   BLAIR-2   Feeling nervous, anxious, or on edge 1 1   Not being able to stop or control worrying 1 1   BLAIR-2 Total Score 2  2        Patient-reported     GAD7:       9/14/2022     8:46 AM 3/11/2025     4:59 PM   BLAIR-7 SCORE   Total Score 1 (minimal anxiety) 6 (mild anxiety)   Total Score 1 6        Patient-reported     CAGE-AID:       5/14/2025     9:08 AM 5/14/2025    10:02 AM   CAGE-AID Total Score   Total Score 0  0    Total Score MyChart  0 (A total score of 2 or greater is considered clinically significant)       Patient-reported     PROMIS 10-Global Health (all questions and answers displayed):       5/14/2025     9:08 AM 5/14/2025    10:01 AM   PROMIS 10   In general, would you say your health is:  Fair   In general, would you say your quality of life is:  Fair   In general, how would you rate your physical health?  Fair   In general, how would you rate your mental health, including your mood and your ability to think?  Poor   In general, how would you rate your satisfaction with your social activities and relationships?  Fair   In general, please rate how well you carry out your usual social activities and roles  Fair   To what extent are you able to carry out your everyday physical activities such as walking, climbing stairs, carrying groceries, or moving a chair?  Mostly   In the past 7 days, how often have you been bothered by emotional problems such as feeling anxious, depressed, or irritable?  Often   In the past 7 days, how would you rate your fatigue on average?  Severe   In the past 7 days, how would you rate your pain on average, where 0 means no pain, and 10 means worst imaginable pain?  5   In general, would you say  your health is: 2 2   In general, would you say your quality of life is: 2 2   In general, how would you rate your physical health? 2 2   In general, how would you rate your mental health, including your mood and your ability to think? 1 1   In general, how would you rate your satisfaction with your social activities and relationships? 2 2   In general, please rate how well you carry out your usual social activities and roles. (This includes activities at home, at work and in your community, and responsibilities as a parent, child, spouse, employee, friend, etc.) 2 2   To what extent are you able to carry out your everyday physical activities such as walking, climbing stairs, carrying groceries, or moving a chair? 4 4   In the past 7 days, how often have you been bothered by emotional problems such as feeling anxious, depressed, or irritable? 4 4   In the past 7 days, how would you rate your fatigue on average? 4 4   In the past 7 days, how would you rate your pain on average, where 0 means no pain, and 10 means worst imaginable pain? 5 5   Global Mental Health Score 7  7    Global Physical Health Score 11  11    PROMIS TOTAL - SUBSCORES 18  18        Patient-reported     [place] Suicide Severity Rating Scale (Lifetime/Recent)      5/14/2025    10:31 AM   [place] Suicide Severity Rating (Lifetime/Recent)   1. Wish to be Dead (Lifetime) Y   1. Wish to be Dead (Past 1 Month) Y   2. Non-Specific Active Suicidal Thoughts (Lifetime) N   2. Non-Specific Active Suicidal Thoughts (Past 1 Month) N   3. Active Suicidal Ideation with any Methods (Not Plan) Without Intent to Act (Lifetime) N   3. Active Suicidal Ideation with any Methods (Not Plan) Without Intent to Act (Past 1 Month) N   4. Active Suicidal Ideation with Some Intent to Act, Without Specific Plan (Lifetime) N   4. Active Suicidal Ideation with Some Intent to Act, Without Specific Plan (Past 1 Month) N   5. Active Suicidal Ideation with Specific Plan and Intent  (Lifetime) N   5. Active Suicidal Ideation with Specific Plan and Intent (Past 1 Month) N   Actual Attempt (Lifetime) N   Has subject engaged in non-suicidal self-injurious behavior? (Lifetime) N   Interrupted Attempts (Lifetime) N   Aborted or Self-Interrupted Attempt (Lifetime) N   Preparatory Acts or Behavior (Lifetime) N   Calculated C-SSRS Risk Score (Lifetime/Recent) Low Risk        Reason for Visit/Presenting Concern:  Depression and Grief/Loss    Current Stressors / Issues:   Pt processed loss of her dog Sparky and guilt of mistakes that happened in with his care with the  and her struggling to help with his aftercare. Pt was fired from  and needed to find a new one.  Pt thought he was recovering but he kept getting worse.  Pt feels angry and at fault and feels like she let him down.  Explored how to be kinder to herself and let go of guilt.      Therapeutic Interventions:  Motivational Interviewing (MI): Validated patient's thoughts, feelings and experience. Expressed respect for patient's autonomy in decision making.  Asked open-ended questions to invite patient's self-reflection and self-direction around change and what is important for them in working towards their goals.  Expressed and demonstrated empathy through reflective listening.  Affirmed patient's strengths and abilities.  Acceptance and Commitment Therapy (ACT): Worked with patient to practice acceptance: making room for painful feelings, urges and sensations, and allowing them to come and go without a struggle.    Response to treatment interventions:   Patient was receptive to interventions utilized.  Patient was engaged in the therapy process.       Progress on Treatment Objective(s) / Homework:   Satisfactory progress - ACTION (Actively working towards change); Intervened by reinforcing change plan / affirming steps taken     Medication Review:   No current psychiatric medications prescribed     Medication Compliance:    NA     Chemical Use Review:  Substance Use: Chemical use reviewed, no active concerns identified      Tobacco Use: No current tobacco use.       Assessment: Current Emotional / Mental Status (status of significant symptoms):    Risk status (Self / Other harm or suicidal ideation)   Patient has had a history of suicidal ideation: SI no plan or intent and denies a history of suicide attempts, self-injurious behavior, homicidal ideation, homicidal behavior, and and other safety concerns   Patient denies current fears or concerns for personal safety.   Patient reports the following current or recent suicidal ideation or behaviors: SI of wishing she could go to sleep and not wake up, no plan or intent.   Patient denies current or recent homicidal ideation or behaviors.   Patient denies current or recent self injurious behavior or ideation.   Patient denies other safety concerns.   Recommended that patient call 911 or go to the local ED should there be a change in any of these risk factors       ASSESSMENT:   Mental Status:     Appearance:                            Appropriate    Eye Contact:                           Good    Psychomotor Behavior:          Normal    Attitude:                                   Interested Friendly Pleasant   Orientation:                             All   Speech Rate / Production:      Normal/ Responsive   Volume:                                   Normal    Mood:                                      Depressed  Sad  Grieving   Affect:                                      Appropriate  Tearful   Thought Content:                    Clear    Thought Form:                        Goal Directed  Logical    Insight:                                     Good          Diagnoses:   Adjustment Disorders  309.0 (F43.21) With depressed mood    Collateral Reports Completed:   Not Applicable       Plan: (Homework, other):   Patient was provided No indications of CD issues  Patient was given information  about behavioral services and encouraged to schedule a follow up appointment with the clinic [facility] in 3 weeks.         CHICA Lowry, [facility]     _____________________________________________________________________________________________________________________________________                                              Individual Treatment Plan    Patient's Name: Hafsa Corona   YOB: 1955  Date of Creation: 6/10/25  Date Treatment Plan Last Reviewed/Revised: NA    DSM5 Diagnoses: Adjustment Disorders  309.0 (F43.21) With depressed mood  Psychosocial / Contextual Factors: Financial Strain and Grief/Loss  PROMIS (reviewed every 90 days):   The following assessments were completed by patient for this visit:  PHQ9:       6/25/2021     7:24 AM 6/3/2022     1:45 PM 9/14/2022     8:45 AM 7/31/2023     5:26 PM 3/3/2025     2:55 PM 5/14/2025     9:03 AM 5/14/2025    10:01 AM   PHQ-9 SCORE   PHQ-9 Total Score MyChart  0 2 (Minimal depression) 4 (Minimal depression) 8 (Mild depression)  7 (Mild depression)   PHQ-9 Total Score 2 0 2 4 8  7  7        Patient-reported     GAD7:       9/14/2022     8:46 AM 3/11/2025     4:59 PM   BLAIR-7 SCORE   Total Score 1 (minimal anxiety) 6 (mild anxiety)   Total Score 1 6        Patient-reported     CAGE-AID:       5/14/2025     9:08 AM 5/14/2025    10:02 AM   CAGE-AID Total Score   Total Score 0  0    Total Score MyChart  0 (A total score of 2 or greater is considered clinically significant)       Patient-reported     PROMIS 10-Global Health (all questions and answers displayed):       5/14/2025     9:08 AM 5/14/2025    10:01 AM   PROMIS 10   In general, would you say your health is:  Fair   In general, would you say your quality of life is:  Fair   In general, how would you rate your physical health?  Fair   In general, how would you rate your mental health, including your mood and your ability to think?  Poor   In general, how would you rate your satisfaction with your  social activities and relationships?  Fair   In general, please rate how well you carry out your usual social activities and roles  Fair   To what extent are you able to carry out your everyday physical activities such as walking, climbing stairs, carrying groceries, or moving a chair?  Mostly   In the past 7 days, how often have you been bothered by emotional problems such as feeling anxious, depressed, or irritable?  Often   In the past 7 days, how would you rate your fatigue on average?  Severe   In the past 7 days, how would you rate your pain on average, where 0 means no pain, and 10 means worst imaginable pain?  5   In general, would you say your health is: 2 2   In general, would you say your quality of life is: 2 2   In general, how would you rate your physical health? 2 2   In general, how would you rate your mental health, including your mood and your ability to think? 1 1   In general, how would you rate your satisfaction with your social activities and relationships? 2 2   In general, please rate how well you carry out your usual social activities and roles. (This includes activities at home, at work and in your community, and responsibilities as a parent, child, spouse, employee, friend, etc.) 2 2   To what extent are you able to carry out your everyday physical activities such as walking, climbing stairs, carrying groceries, or moving a chair? 4 4   In the past 7 days, how often have you been bothered by emotional problems such as feeling anxious, depressed, or irritable? 4 4   In the past 7 days, how would you rate your fatigue on average? 4 4   In the past 7 days, how would you rate your pain on average, where 0 means no pain, and 10 means worst imaginable pain? 5 5   Global Mental Health Score 7  7    Global Physical Health Score 11  11    PROMIS TOTAL - SUBSCORES 18  18        Patient-reported     [place] Suicide Severity Rating Scale (Lifetime/Recent)      5/14/2025    10:31 AM   [place] Suicide  Severity Rating (Lifetime/Recent)   1. Wish to be Dead (Lifetime) Y   1. Wish to be Dead (Past 1 Month) Y   2. Non-Specific Active Suicidal Thoughts (Lifetime) N   2. Non-Specific Active Suicidal Thoughts (Past 1 Month) N   3. Active Suicidal Ideation with any Methods (Not Plan) Without Intent to Act (Lifetime) N   3. Active Suicidal Ideation with any Methods (Not Plan) Without Intent to Act (Past 1 Month) N   4. Active Suicidal Ideation with Some Intent to Act, Without Specific Plan (Lifetime) N   4. Active Suicidal Ideation with Some Intent to Act, Without Specific Plan (Past 1 Month) N   5. Active Suicidal Ideation with Specific Plan and Intent (Lifetime) N   5. Active Suicidal Ideation with Specific Plan and Intent (Past 1 Month) N   Actual Attempt (Lifetime) N   Has subject engaged in non-suicidal self-injurious behavior? (Lifetime) N   Interrupted Attempts (Lifetime) N   Aborted or Self-Interrupted Attempt (Lifetime) N   Preparatory Acts or Behavior (Lifetime) N   Calculated C-SSRS Risk Score (Lifetime/Recent) Low Risk        Referral / Collaboration:  The following referral(s) will be initiated: TBD.    Anticipated number of session for this episode of care:  9-12 sessions  Anticipation frequency of session: Biweekly  Anticipated Duration of each session: 16-37 minutes  Treatment plan will be reviewed in 90 days or when goals have been changed.       MeasurableTreatment Goal(s) related to diagnosis / functional impairment(s)  Goal 1: Patient will reduce symptoms of grief and improve coping skills. increase awareness of cognitive distortions related to grief and loss and identify ways to appropriately challenge these beliefs. develop and implement practice of coping strategies such as relaxation and mindfulness techniques to manage uncomfortable feelings and physical symptoms associated with grief. implement a structured sleep hygiene routine including creating a comfortable sleep environment, consistent bed  and wake times and avoidance of stimulating activities prior to bedtime.     I will know I've met my goal when I am able to let go of guilt for Raleigh's death.      Status: New - Date: 6/10/25     Intervention(s)  [facility] will Acceptance and Commitment Therapy (ACT): Utilize mindfulness strategies to help the patient become more able to be present to difficult aspects of their experience and make decisions consistent with their values. Work with patient to practice cognitive defusion: distancing from and letting go of unhelpful thoughts, beliefs and memories. Work with patient to practice acceptance: making room for painful feelings, urges and sensations, and allowing them to come and go without a struggle. Work with patient to practice contact with the present moment: engaging fully with here-and-now experience, with an attitude of openness and curiosity.       Patient has reviewed and agreed to the above plan.     Written by  CHICA Lowry, [facility]

## 2025-06-11 ENCOUNTER — TELEPHONE (OUTPATIENT)
Dept: UROLOGY | Facility: CLINIC | Age: 70
End: 2025-06-11
Payer: MEDICARE

## 2025-06-12 ENCOUNTER — OFFICE VISIT (OUTPATIENT)
Dept: URGENT CARE | Facility: URGENT CARE | Age: 70
End: 2025-06-12
Payer: MEDICARE

## 2025-06-12 VITALS
TEMPERATURE: 97.8 F | OXYGEN SATURATION: 96 % | BODY MASS INDEX: 35.88 KG/M2 | WEIGHT: 195 LBS | HEIGHT: 62 IN | SYSTOLIC BLOOD PRESSURE: 118 MMHG | HEART RATE: 93 BPM | DIASTOLIC BLOOD PRESSURE: 74 MMHG | RESPIRATION RATE: 18 BRPM

## 2025-06-12 DIAGNOSIS — B96.89 ACUTE BACTERIAL RHINOSINUSITIS: Primary | ICD-10-CM

## 2025-06-12 DIAGNOSIS — N18.30 STAGE 3 CHRONIC KIDNEY DISEASE, UNSPECIFIED WHETHER STAGE 3A OR 3B CKD (H): ICD-10-CM

## 2025-06-12 DIAGNOSIS — J01.90 ACUTE BACTERIAL RHINOSINUSITIS: Primary | ICD-10-CM

## 2025-06-12 RX ORDER — AZITHROMYCIN 250 MG/1
TABLET, FILM COATED ORAL
Qty: 6 TABLET | Refills: 0 | Status: SHIPPED | OUTPATIENT
Start: 2025-06-12 | End: 2025-06-17

## 2025-06-12 NOTE — PROGRESS NOTES
"Assessment & Plan      Diagnosis Comments   1. Acute bacterial rhinosinusitis  azithromycin (ZITHROMAX) 250 MG tablet   Patient Instructions   Home care for sinusitis includes; antibiotic take daily with food as this may cause stomach upset. Please take with a probiotic (not directly with) two hours prior or after, to protect good bacteria in colon.   Humidifier in room if available. May use flonase daily to help decrease swelling and dry up drainage. Recommend saline lavage to help remove mucous and moisturize sinuses.     Please follow up in clinic, with your primary care provider if symptoms not improving with treatment.                 2. Stage 3 chronic kidney disease, unspecified whether stage 3a or 3b CKD (H)  stable          Brook Delavlle, [facility] [place]    Aiyana Pereyra is a 70 year old female who presents to clinic today for the following health issues:  Chief Complaint   Patient presents with    Urgent Care     Sick since end of last week, was getting better but getting worse again. Seen on Monday here.          6/12/2025     4:49 PM   Additional Questions   Roomed by HEATHER Kohler     HPI    Recently seen CXR, covid, covid neg.  URI Adult    Onset of symptoms was 2 week(s) ago.  Course of illness is worsening.    Severity moderate  Current and Associated symptoms: runny nose, stuffy nose, cough - non-productive, sore throat, hoarse voice, facial pain/pressure, and headache  Treatment measures tried include Tylenol/Ibuprofen, Vaporizer, Fluids, Rest, and tessalon.  Predisposing factors include None.      Review of Systems  Constitutional, HEENT, cardiovascular, pulmonary, gi and gu systems are negative, except as otherwise noted.      Objective    /74   Pulse 93   Temp 97.8  F (36.6  C) (Oral)   Resp 18   Ht 1.575 m (5' 2\")   Wt 88.5 kg (195 lb)   LMP 01/01/2009   SpO2 96%   BMI 35.67 kg/m    Physical Exam   GENERAL: alert, no distress, and " fatigued  EYES: Eyes grossly normal to inspection, PERRL and conjunctivae and sclerae normal  HENT: normal cephalic/atraumatic, ear canals and TM's normal, nose and mouth without ulcers or lesions, nasal mucosa edematous , rhinorrhea yellow, oropharynx clear, oral mucous membranes moist, and sinuses: maxillary tenderness on bilateral  NECK: bilateral anterior cervical adenopathy, no asymmetry, masses, or scars, and thyroid normal to palpation  RESP: lungs clear to auscultation - no rales, rhonchi or wheezes  CV: regular rate and rhythm, normal S1 S2, no S3 or S4, no murmur, click or rub, no peripheral edema  ABDOMEN: soft, nontender, no hepatosplenomegaly, no masses and bowel sounds normal  MS: no gross musculoskeletal defects noted, no edema  SKIN: no suspicious lesions or rashes

## 2025-06-12 NOTE — PATIENT INSTRUCTIONS
Home care for sinusitis includes; antibiotic take daily with food as this may cause stomach upset. Please take with a probiotic (not directly with) two hours prior or after, to protect good bacteria in colon.   Humidifier in room if available. May use flonase daily to help decrease swelling and dry up drainage. Recommend saline lavage to help remove mucous and moisturize sinuses.     Please follow up in clinic, with your primary care provider if symptoms not improving with treatment.

## 2025-06-12 NOTE — PROGRESS NOTES
Urgent Care Clinic Visit    Chief Complaint   Patient presents with    Urgent Care     Sick since end of last week, was getting better but getting worse again. Seen on Monday here.                6/12/2025     4:49 PM   Additional Questions   Roomed by HEATHER Kohler

## 2025-06-25 ENCOUNTER — VIRTUAL VISIT (OUTPATIENT)
Dept: BEHAVIORAL HEALTH | Facility: CLINIC | Age: 70
End: 2025-06-25
Payer: MEDICARE

## 2025-06-25 DIAGNOSIS — F43.21 ADJUSTMENT DISORDER WITH DEPRESSED MOOD: Primary | ICD-10-CM

## 2025-06-25 PROCEDURE — 90834 PSYTX W PT 45 MINUTES: CPT | Mod: 95 | Performed by: SOCIAL WORKER

## 2025-06-25 ASSESSMENT — PATIENT HEALTH QUESTIONNAIRE - PHQ9
10. IF YOU CHECKED OFF ANY PROBLEMS, HOW DIFFICULT HAVE THESE PROBLEMS MADE IT FOR YOU TO DO YOUR WORK, TAKE CARE OF THINGS AT HOME, OR GET ALONG WITH OTHER PEOPLE: SOMEWHAT DIFFICULT
SUM OF ALL RESPONSES TO PHQ QUESTIONS 1-9: 7
SUM OF ALL RESPONSES TO PHQ QUESTIONS 1-9: 7

## 2025-06-25 NOTE — PROGRESS NOTES
[facility]: Integrated Behavioral Health    Behavioral Health Clinician Progress Note   Mental Health & Addiction Services      Wednesday June 25, 2025    Patient Name: Hafsa Corona       Service Type:  Individual   Service Location:  [facility] [place] / Email (patient reached)   Visit Start Time: 11:30 AM  Visit End Time:  12:20 PM   Session Length: 38 - 52    Attendees: Client   Service Modality: Video Visit:      Provider verified identity through the following two step process.  Patient provided:  Patient photo    Telemedicine Visit: The patient's condition can be safely assessed and treated via synchronous audio and visual telemedicine encounter.      Reason for Telemedicine Visit: Patient has requested telehealth visit    Originating Site (Patient Location): Patient's home    Distant Site (Provider Location): Provider Remote Setting- Home Office    Consent:  The patient/guardian has verbally consented to: the potential risks and benefits of telemedicine (video visit) versus in person care; bill my insurance or make self-payment for services provided; and responsibility for payment of non-covered services.     Patient would like the video invitation sent by:  My Chart    Mode of Communication:  Video Conference via [facility]    Distant Location (Provider):  Off-site    As the provider I attest to compliance with applicable laws and regulations related to telemedicine.   Visit number: 5    [facility] Visit Activities (Refresh list every visit): [facility] Only     Date of Brief Diagnostic Assessment : 5/29/25  Treatment Plan Review Date: NA      DATA:    Extended Session (60+ minutes): No   Interactive Complexity: No   Crisis: No   [facility] Patient: No     Assessments completed:  The following assessments were completed by patient for this visit:  PHQ2: No questionnaires on file.  PHQ9:       6/3/2022     1:45 PM 9/14/2022     8:45 AM 7/31/2023     5:26 PM 3/3/2025     2:55 PM 5/14/2025     9:03 AM  5/14/2025    10:01 AM 6/25/2025    11:14 AM   PHQ-9 SCORE   PHQ-9 Total Score MyChart 0 2 (Minimal depression) 4 (Minimal depression) 8 (Mild depression)  7 (Mild depression) 7 (Mild depression)   PHQ-9 Total Score 0 2 4 8  7  7  7        Patient-reported     GAD2:       5/14/2025     9:04 AM 5/14/2025    10:01 AM 6/25/2025    11:14 AM   BLAIR-2   Feeling nervous, anxious, or on edge 1 1 1   Not being able to stop or control worrying 1 1    BLAIR-2 Total Score 2  2         Patient-reported     GAD7:       9/14/2022     8:46 AM 3/11/2025     4:59 PM   BLAIR-7 SCORE   Total Score 1 (minimal anxiety) 6 (mild anxiety)   Total Score 1 6        Patient-reported     CAGE-AID:       5/14/2025     9:08 AM 5/14/2025    10:02 AM   CAGE-AID Total Score   Total Score 0  0    Total Score MyChart  0 (A total score of 2 or greater is considered clinically significant)       Patient-reported     PROMIS 10-Global Health (all questions and answers displayed):       5/14/2025     9:08 AM 5/14/2025    10:01 AM   PROMIS 10   In general, would you say your health is:  Fair   In general, would you say your quality of life is:  Fair   In general, how would you rate your physical health?  Fair   In general, how would you rate your mental health, including your mood and your ability to think?  Poor   In general, how would you rate your satisfaction with your social activities and relationships?  Fair   In general, please rate how well you carry out your usual social activities and roles  Fair   To what extent are you able to carry out your everyday physical activities such as walking, climbing stairs, carrying groceries, or moving a chair?  Mostly   In the past 7 days, how often have you been bothered by emotional problems such as feeling anxious, depressed, or irritable?  Often   In the past 7 days, how would you rate your fatigue on average?  Severe   In the past 7 days, how would you rate your pain on average, where 0 means no pain, and 10 means  worst imaginable pain?  5   In general, would you say your health is: 2 2   In general, would you say your quality of life is: 2 2   In general, how would you rate your physical health? 2 2   In general, how would you rate your mental health, including your mood and your ability to think? 1 1   In general, how would you rate your satisfaction with your social activities and relationships? 2 2   In general, please rate how well you carry out your usual social activities and roles. (This includes activities at home, at work and in your community, and responsibilities as a parent, child, spouse, employee, friend, etc.) 2 2   To what extent are you able to carry out your everyday physical activities such as walking, climbing stairs, carrying groceries, or moving a chair? 4 4   In the past 7 days, how often have you been bothered by emotional problems such as feeling anxious, depressed, or irritable? 4 4   In the past 7 days, how would you rate your fatigue on average? 4 4   In the past 7 days, how would you rate your pain on average, where 0 means no pain, and 10 means worst imaginable pain? 5 5   Global Mental Health Score 7  7    Global Physical Health Score 11  11    PROMIS TOTAL - SUBSCORES 18  18        Patient-reported     [place] Suicide Severity Rating Scale (Lifetime/Recent)      5/14/2025    10:31 AM   [place] Suicide Severity Rating (Lifetime/Recent)   1. Wish to be Dead (Lifetime) Y   1. Wish to be Dead (Past 1 Month) Y   2. Non-Specific Active Suicidal Thoughts (Lifetime) N   2. Non-Specific Active Suicidal Thoughts (Past 1 Month) N   3. Active Suicidal Ideation with any Methods (Not Plan) Without Intent to Act (Lifetime) N   3. Active Suicidal Ideation with any Methods (Not Plan) Without Intent to Act (Past 1 Month) N   4. Active Suicidal Ideation with Some Intent to Act, Without Specific Plan (Lifetime) N   4. Active Suicidal Ideation with Some Intent to Act, Without Specific Plan (Past 1 Month) N   5.  Active Suicidal Ideation with Specific Plan and Intent (Lifetime) N   5. Active Suicidal Ideation with Specific Plan and Intent (Past 1 Month) N   Actual Attempt (Lifetime) N   Has subject engaged in non-suicidal self-injurious behavior? (Lifetime) N   Interrupted Attempts (Lifetime) N   Aborted or Self-Interrupted Attempt (Lifetime) N   Preparatory Acts or Behavior (Lifetime) N   Calculated C-SSRS Risk Score (Lifetime/Recent) Low Risk        Reason for Visit/Presenting Concern:  Depression and Grief/Loss    Current Stressors / Issues:       Pt still grieving the past 2 1/2 years from getting laid off, death of pets, events in the world. Not taking antidepressants and noticing that this makes her feel things more vividly.  Feeling sleepy, has been sick over the past couple weeks, and finally feeling better.  Discussed her healing journey of past grief and sadness, pt in touch with difficult and positive feelings.     Therapeutic Interventions:  Motivational Interviewing (MI): Validated patient's thoughts, feelings and experience. Expressed respect for patient's autonomy in decision making.  Asked open-ended questions to invite patient's self-reflection and self-direction around change and what is important for them in working towards their goals.  Expressed and demonstrated empathy through reflective listening.  Affirmed patient's strengths and abilities.  Acceptance and Commitment Therapy (ACT): Worked with patient to practice acceptance: making room for painful feelings, urges and sensations, and allowing them to come and go without a struggle.    Response to treatment interventions:   Patient was receptive to interventions utilized.  Patient was engaged in the therapy process.       Progress on Treatment Objective(s) / Homework:   Satisfactory progress - ACTION (Actively working towards change); Intervened by reinforcing change plan / affirming steps taken     Medication Review:   No current psychiatric  medications prescribed     Medication Compliance:   NA     Chemical Use Review:  Substance Use: Chemical use reviewed, no active concerns identified      Tobacco Use: No current tobacco use.       Assessment: Current Emotional / Mental Status (status of significant symptoms):    Risk status (Self / Other harm or suicidal ideation)   Patient has had a history of suicidal ideation: SI no plan or intent and denies a history of suicide attempts, self-injurious behavior, homicidal ideation, homicidal behavior, and and other safety concerns   Patient denies current fears or concerns for personal safety.   Patient reports the following current or recent suicidal ideation or behaviors: SI of wishing she could go to sleep and not wake up, no plan or intent.   Patient denies current or recent homicidal ideation or behaviors.   Patient denies current or recent self injurious behavior or ideation.   Patient denies other safety concerns.   Recommended that patient call 911 or go to the local ED should there be a change in any of these risk factors       ASSESSMENT:   Mental Status:     Appearance:                            Appropriate    Eye Contact:                           Good    Psychomotor Behavior:          Normal    Attitude:                                   Interested Friendly Pleasant   Orientation:                             All   Speech Rate / Production:      Normal/ Responsive   Volume:                                   Normal    Mood:                                      Depressed  Sad  Grieving   Affect:                                      Appropriate  Tearful   Thought Content:                    Clear    Thought Form:                        Goal Directed  Logical    Insight:                                     Good          Diagnoses:   Adjustment Disorders  309.0 (F43.21) With depressed mood    Collateral Reports Completed:   Not Applicable       Plan: (Homework, other):   Patient was provided No  indications of CD issues  Patient was given information about behavioral services and encouraged to schedule a follow up appointment with the clinic [facility] [place] in 3 weeks.         CHICA Lowry, [facility] [place]     _____________________________________________________________________________________________________________________________________                                              Individual Treatment Plan    Patient's Name: Hafsa Corona   YOB: 1955  Date of Creation: 6/10/25  Date Treatment Plan Last Reviewed/Revised: NA    DSM5 Diagnoses: Adjustment Disorders  309.0 (F43.21) With depressed mood  Psychosocial / Contextual Factors: Financial Strain and Grief/Loss  PROMIS (reviewed every 90 days):   The following assessments were completed by patient for this visit:  PHQ9:       6/3/2022     1:45 PM 9/14/2022     8:45 AM 7/31/2023     5:26 PM 3/3/2025     2:55 PM 5/14/2025     9:03 AM 5/14/2025    10:01 AM 6/25/2025    11:14 AM   PHQ-9 SCORE   PHQ-9 Total Score MyChart 0 2 (Minimal depression) 4 (Minimal depression) 8 (Mild depression)  7 (Mild depression) 7 (Mild depression)   PHQ-9 Total Score 0 2 4 8  7  7  7        Patient-reported     GAD7:       9/14/2022     8:46 AM 3/11/2025     4:59 PM   BLAIR-7 SCORE   Total Score 1 (minimal anxiety) 6 (mild anxiety)   Total Score 1 6        Patient-reported     CAGE-AID:       5/14/2025     9:08 AM 5/14/2025    10:02 AM   CAGE-AID Total Score   Total Score 0  0    Total Score MyChart  0 (A total score of 2 or greater is considered clinically significant)       Patient-reported     PROMIS 10-Global Health (all questions and answers displayed):       5/14/2025     9:08 AM 5/14/2025    10:01 AM   PROMIS 10   In general, would you say your health is:  Fair   In general, would you say your quality of life is:  Fair   In general, how would you rate your physical health?  Fair   In general, how would you rate your mental health, including your mood and your ability to  think?  Poor   In general, how would you rate your satisfaction with your social activities and relationships?  Fair   In general, please rate how well you carry out your usual social activities and roles  Fair   To what extent are you able to carry out your everyday physical activities such as walking, climbing stairs, carrying groceries, or moving a chair?  Mostly   In the past 7 days, how often have you been bothered by emotional problems such as feeling anxious, depressed, or irritable?  Often   In the past 7 days, how would you rate your fatigue on average?  Severe   In the past 7 days, how would you rate your pain on average, where 0 means no pain, and 10 means worst imaginable pain?  5   In general, would you say your health is: 2 2   In general, would you say your quality of life is: 2 2   In general, how would you rate your physical health? 2 2   In general, how would you rate your mental health, including your mood and your ability to think? 1 1   In general, how would you rate your satisfaction with your social activities and relationships? 2 2   In general, please rate how well you carry out your usual social activities and roles. (This includes activities at home, at work and in your community, and responsibilities as a parent, child, spouse, employee, friend, etc.) 2 2   To what extent are you able to carry out your everyday physical activities such as walking, climbing stairs, carrying groceries, or moving a chair? 4 4   In the past 7 days, how often have you been bothered by emotional problems such as feeling anxious, depressed, or irritable? 4 4   In the past 7 days, how would you rate your fatigue on average? 4 4   In the past 7 days, how would you rate your pain on average, where 0 means no pain, and 10 means worst imaginable pain? 5 5   Global Mental Health Score 7  7    Global Physical Health Score 11  11    PROMIS TOTAL - SUBSCORES 18  18        Patient-reported     [place] Suicide Severity  Rating Scale (Lifetime/Recent)      5/14/2025    10:31 AM   [place] Suicide Severity Rating (Lifetime/Recent)   1. Wish to be Dead (Lifetime) Y   1. Wish to be Dead (Past 1 Month) Y   2. Non-Specific Active Suicidal Thoughts (Lifetime) N   2. Non-Specific Active Suicidal Thoughts (Past 1 Month) N   3. Active Suicidal Ideation with any Methods (Not Plan) Without Intent to Act (Lifetime) N   3. Active Suicidal Ideation with any Methods (Not Plan) Without Intent to Act (Past 1 Month) N   4. Active Suicidal Ideation with Some Intent to Act, Without Specific Plan (Lifetime) N   4. Active Suicidal Ideation with Some Intent to Act, Without Specific Plan (Past 1 Month) N   5. Active Suicidal Ideation with Specific Plan and Intent (Lifetime) N   5. Active Suicidal Ideation with Specific Plan and Intent (Past 1 Month) N   Actual Attempt (Lifetime) N   Has subject engaged in non-suicidal self-injurious behavior? (Lifetime) N   Interrupted Attempts (Lifetime) N   Aborted or Self-Interrupted Attempt (Lifetime) N   Preparatory Acts or Behavior (Lifetime) N   Calculated C-SSRS Risk Score (Lifetime/Recent) Low Risk        Referral / Collaboration:  The following referral(s) will be initiated: TBD.    Anticipated number of session for this episode of care:  9-12 sessions  Anticipation frequency of session: Biweekly  Anticipated Duration of each session: 16-37 minutes  Treatment plan will be reviewed in 90 days or when goals have been changed.       MeasurableTreatment Goal(s) related to diagnosis / functional impairment(s)  Goal 1: Patient will reduce symptoms of grief and improve coping skills. increase awareness of cognitive distortions related to grief and loss and identify ways to appropriately challenge these beliefs. develop and implement practice of coping strategies such as relaxation and mindfulness techniques to manage uncomfortable feelings and physical symptoms associated with grief. implement a structured sleep hygiene  routine including creating a comfortable sleep environment, consistent bed and wake times and avoidance of stimulating activities prior to bedtime.     I will know I've met my goal when I am able to let go of guilt for Raleigh's death.      Status: New - Date: 6/10/25     Intervention(s)  [facility] will Acceptance and Commitment Therapy (ACT): Utilize mindfulness strategies to help the patient become more able to be present to difficult aspects of their experience and make decisions consistent with their values. Work with patient to practice cognitive defusion: distancing from and letting go of unhelpful thoughts, beliefs and memories. Work with patient to practice acceptance: making room for painful feelings, urges and sensations, and allowing them to come and go without a struggle. Work with patient to practice contact with the present moment: engaging fully with here-and-now experience, with an attitude of openness and curiosity.       Patient has reviewed and agreed to the above plan.     Written by  CHICA Lowry, [facility]

## 2025-07-11 NOTE — ED NOTES
Pain has decreased since arrival.    egg. Over the next 6 weeks or so, your bleeding should decrease a little every day and slowly change to a pinkish and then whitish discharge.  For cramps or mild pain, try an over-the-counter pain medicine, such as acetaminophen (Tylenol) or ibuprofen (Advil, Motrin). Read and follow all instructions on the label.  To ease pain around the vagina or from hemorrhoids:  Put ice or a cold pack on the area for 10 to 20 minutes at a time. Put a thin cloth between the ice and your skin.  Try sitting in a few inches of warm water (sitz bath) when you can or after bowel movements.  Clean yourself with a gentle squeeze of warm water from a bottle instead of wiping with toilet paper.  Use witch hazel or hemorrhoid pads (such as Tucks).  If you have stitches, they will dissolve on their own and don't need to be removed. Follow your doctor's instructions for cleaning the stitched area.  Ease constipation by drinking plenty of fluids and eating high-fiber foods. Ask your doctor or midwife about over-the-counter stool softeners.  Try using a cold compress for sore and swollen breasts. And wear a supportive bra that fits.  Eat a variety of healthy foods, like fruits, vegetables, whole grains, and lean protein. Keep taking your prenatal vitamins, or take a multivitamin.  Activity  Rest when you can. Ask for help from family or friends when you need it.  When you feel ready, try to get some exercise every day. For many people, walking is a good choice. Don't do any heavy exercise until your doctor or midwife says it's okay.  Ask your doctor or midwife when it is okay to have vaginal sex.  If you don't want to get pregnant, talk to your doctor or midwife about birth control options. You can get pregnant even before your period returns. Also, you can get pregnant while you are breastfeeding.  Emotional health  It's normal to have some sadness, anxiety, and mood swings after delivery. It may help to talk with a trusted friend or family  blurring, or seeing spots).  A severe headache.     You have signs of heart failure, such as:  New or increased shortness of breath.  New or worse swelling in your legs, ankles, or feet.  Sudden weight gain, such as more than 2 to 3 pounds in a day or 5 pounds in a week.  Feeling so tired or weak that you cannot do your usual activities.     You had spinal or epidural pain relief and have:  New or worse back pain.  Increased pain, swelling, warmth, or redness at the injection site.  Tingling, weakness, or numbness in your legs or groin.   Watch closely for changes in your health, and be sure to contact your doctor or midwife if:    Your vaginal bleeding isn't decreasing.     You feel sad, anxious, or hopeless for more than a few days.     You are having problems with your breasts or breastfeeding.   Where can you learn more?  Go to https://www.[employer].net/patientEd and enter Z768 to learn more about \"Postpartum Care at Home With Your Baby: Care Instructions.\"  Current as of: April 30, 2024  Content Version: 14.5  © 3366-3466 [employer].   Care instructions adapted under license by [employer]. If you have questions about a medical condition or this instruction, always ask your healthcare professional. [employer] disclaims any warranty or liability for your use of this information.

## 2025-07-30 ENCOUNTER — VIRTUAL VISIT (OUTPATIENT)
Dept: BEHAVIORAL HEALTH | Facility: CLINIC | Age: 70
End: 2025-07-30
Payer: MEDICARE

## 2025-07-30 DIAGNOSIS — F43.21 ADJUSTMENT DISORDER WITH DEPRESSED MOOD: Primary | ICD-10-CM

## 2025-07-30 PROCEDURE — 90791 PSYCH DIAGNOSTIC EVALUATION: CPT | Mod: 95 | Performed by: SOCIAL WORKER

## 2025-08-12 ASSESSMENT — PATIENT HEALTH QUESTIONNAIRE - PHQ9
SUM OF ALL RESPONSES TO PHQ QUESTIONS 1-9: 5
10. IF YOU CHECKED OFF ANY PROBLEMS, HOW DIFFICULT HAVE THESE PROBLEMS MADE IT FOR YOU TO DO YOUR WORK, TAKE CARE OF THINGS AT HOME, OR GET ALONG WITH OTHER PEOPLE: SOMEWHAT DIFFICULT
SUM OF ALL RESPONSES TO PHQ QUESTIONS 1-9: 5

## 2025-08-13 ENCOUNTER — OFFICE VISIT (OUTPATIENT)
Dept: BEHAVIORAL HEALTH | Facility: CLINIC | Age: 70
End: 2025-08-13
Payer: COMMERCIAL

## 2025-08-13 DIAGNOSIS — F43.21 ADJUSTMENT DISORDER WITH DEPRESSED MOOD: Primary | ICD-10-CM

## 2025-08-13 PROCEDURE — 90834 PSYTX W PT 45 MINUTES: CPT | Mod: 95 | Performed by: SOCIAL WORKER

## 2025-08-20 ENCOUNTER — OFFICE VISIT (OUTPATIENT)
Dept: URGENT CARE | Facility: URGENT CARE | Age: 70
End: 2025-08-20
Payer: COMMERCIAL

## 2025-08-20 VITALS
DIASTOLIC BLOOD PRESSURE: 74 MMHG | SYSTOLIC BLOOD PRESSURE: 108 MMHG | TEMPERATURE: 99.4 F | OXYGEN SATURATION: 95 % | RESPIRATION RATE: 18 BRPM | HEART RATE: 103 BPM

## 2025-08-20 DIAGNOSIS — F41.9 ANXIETY AND DEPRESSION: ICD-10-CM

## 2025-08-20 DIAGNOSIS — F32.A ANXIETY AND DEPRESSION: ICD-10-CM

## 2025-08-20 DIAGNOSIS — J06.9 URI WITH COUGH AND CONGESTION: Primary | ICD-10-CM

## 2025-08-20 DIAGNOSIS — Z59.9 FINANCIAL DIFFICULTIES: ICD-10-CM

## 2025-08-20 DIAGNOSIS — F43.0 STRESS RESPONSE: ICD-10-CM

## 2025-08-20 DIAGNOSIS — J01.40 ACUTE NON-RECURRENT PANSINUSITIS: ICD-10-CM

## 2025-08-20 PROCEDURE — 3078F DIAST BP <80 MM HG: CPT | Performed by: NURSE PRACTITIONER

## 2025-08-20 PROCEDURE — 99214 OFFICE O/P EST MOD 30 MIN: CPT | Performed by: NURSE PRACTITIONER

## 2025-08-20 PROCEDURE — 3074F SYST BP LT 130 MM HG: CPT | Performed by: NURSE PRACTITIONER

## 2025-08-20 SDOH — ECONOMIC STABILITY - INCOME SECURITY: PROBLEM RELATED TO HOUSING AND ECONOMIC CIRCUMSTANCES, UNSPECIFIED: Z59.9

## 2025-08-20 ASSESSMENT — ANXIETY QUESTIONNAIRES
GAD7 TOTAL SCORE: 9
7. FEELING AFRAID AS IF SOMETHING AWFUL MIGHT HAPPEN: MORE THAN HALF THE DAYS
2. NOT BEING ABLE TO STOP OR CONTROL WORRYING: SEVERAL DAYS
5. BEING SO RESTLESS THAT IT IS HARD TO SIT STILL: NOT AT ALL
1. FEELING NERVOUS, ANXIOUS, OR ON EDGE: MORE THAN HALF THE DAYS
6. BECOMING EASILY ANNOYED OR IRRITABLE: SEVERAL DAYS
IF YOU CHECKED OFF ANY PROBLEMS ON THIS QUESTIONNAIRE, HOW DIFFICULT HAVE THESE PROBLEMS MADE IT FOR YOU TO DO YOUR WORK, TAKE CARE OF THINGS AT HOME, OR GET ALONG WITH OTHER PEOPLE: EXTREMELY DIFFICULT
3. WORRYING TOO MUCH ABOUT DIFFERENT THINGS: MORE THAN HALF THE DAYS
GAD7 TOTAL SCORE: 9

## 2025-08-20 ASSESSMENT — PATIENT HEALTH QUESTIONNAIRE - PHQ9
SUM OF ALL RESPONSES TO PHQ QUESTIONS 1-9: 9
5. POOR APPETITE OR OVEREATING: SEVERAL DAYS

## 2025-08-25 ENCOUNTER — PATIENT OUTREACH (OUTPATIENT)
Dept: FAMILY MEDICINE | Facility: CLINIC | Age: 70
End: 2025-08-25
Payer: COMMERCIAL

## 2025-08-25 ENCOUNTER — PATIENT OUTREACH (OUTPATIENT)
Dept: CARE COORDINATION | Facility: CLINIC | Age: 70
End: 2025-08-25

## 2025-08-25 ASSESSMENT — ACTIVITIES OF DAILY LIVING (ADL): DEPENDENT_IADLS:: CLEANING;COOKING;LAUNDRY;MEAL PREPARATION;MONEY MANAGEMENT;TRANSPORTATION

## 2025-08-26 ENCOUNTER — PATIENT OUTREACH (OUTPATIENT)
Dept: CARE COORDINATION | Facility: CLINIC | Age: 70
End: 2025-08-26
Payer: COMMERCIAL

## 2025-08-27 ENCOUNTER — OFFICE VISIT (OUTPATIENT)
Dept: FAMILY MEDICINE | Facility: CLINIC | Age: 70
End: 2025-08-27
Payer: COMMERCIAL

## 2025-08-27 VITALS
RESPIRATION RATE: 20 BRPM | SYSTOLIC BLOOD PRESSURE: 99 MMHG | TEMPERATURE: 99 F | DIASTOLIC BLOOD PRESSURE: 65 MMHG | HEART RATE: 92 BPM | OXYGEN SATURATION: 98 % | HEIGHT: 62 IN | BODY MASS INDEX: 34.23 KG/M2 | WEIGHT: 186 LBS

## 2025-08-27 DIAGNOSIS — E11.9 TYPE 2 DIABETES MELLITUS WITHOUT COMPLICATION, WITHOUT LONG-TERM CURRENT USE OF INSULIN (H): Primary | ICD-10-CM

## 2025-08-27 DIAGNOSIS — R60.0 PERIPHERAL EDEMA: ICD-10-CM

## 2025-08-27 DIAGNOSIS — M79.672 PAIN IN BOTH FEET: ICD-10-CM

## 2025-08-27 DIAGNOSIS — R45.86 MOOD CHANGES: ICD-10-CM

## 2025-08-27 DIAGNOSIS — R79.89 ELEVATED LFTS: ICD-10-CM

## 2025-08-27 DIAGNOSIS — M79.671 PAIN IN BOTH FEET: ICD-10-CM

## 2025-08-27 DIAGNOSIS — J01.40 ACUTE NON-RECURRENT PANSINUSITIS: ICD-10-CM

## 2025-08-27 LAB
ALBUMIN SERPL BCG-MCNC: 4.1 G/DL (ref 3.5–5.2)
ALP SERPL-CCNC: 83 U/L (ref 40–150)
ALT SERPL W P-5'-P-CCNC: 17 U/L (ref 0–50)
AST SERPL W P-5'-P-CCNC: 38 U/L (ref 0–45)
BILIRUB SERPL-MCNC: 0.5 MG/DL
BILIRUBIN DIRECT (ROCHE PRO & PURE): 0.2 MG/DL (ref 0–0.45)
EST. AVERAGE GLUCOSE BLD GHB EST-MCNC: 137 MG/DL
HBA1C MFR BLD: 6.4 % (ref 0–5.6)
PROT SERPL-MCNC: 7.1 G/DL (ref 6.4–8.3)

## 2025-08-27 PROCEDURE — 3074F SYST BP LT 130 MM HG: CPT | Performed by: NURSE PRACTITIONER

## 2025-08-27 PROCEDURE — 99215 OFFICE O/P EST HI 40 MIN: CPT | Performed by: NURSE PRACTITIONER

## 2025-08-27 PROCEDURE — 83036 HEMOGLOBIN GLYCOSYLATED A1C: CPT | Performed by: NURSE PRACTITIONER

## 2025-08-27 PROCEDURE — 80076 HEPATIC FUNCTION PANEL: CPT | Performed by: NURSE PRACTITIONER

## 2025-08-27 PROCEDURE — 36415 COLL VENOUS BLD VENIPUNCTURE: CPT | Performed by: NURSE PRACTITIONER

## 2025-08-27 PROCEDURE — 3044F HG A1C LEVEL LT 7.0%: CPT | Performed by: NURSE PRACTITIONER

## 2025-08-27 PROCEDURE — 3078F DIAST BP <80 MM HG: CPT | Performed by: NURSE PRACTITIONER

## 2025-08-27 PROCEDURE — G2211 COMPLEX E/M VISIT ADD ON: HCPCS | Performed by: NURSE PRACTITIONER

## 2025-08-27 RX ORDER — METFORMIN HYDROCHLORIDE 500 MG/1
TABLET, EXTENDED RELEASE ORAL
Qty: 180 TABLET | Refills: 3 | Status: SHIPPED | OUTPATIENT
Start: 2025-08-27

## 2025-08-27 RX ORDER — ARIPIPRAZOLE 2 MG/1
2 TABLET ORAL DAILY
Qty: 30 TABLET | Refills: 2 | Status: SHIPPED | OUTPATIENT
Start: 2025-08-27

## 2025-08-27 ASSESSMENT — PATIENT HEALTH QUESTIONNAIRE - PHQ9
SUM OF ALL RESPONSES TO PHQ QUESTIONS 1-9: 8
10. IF YOU CHECKED OFF ANY PROBLEMS, HOW DIFFICULT HAVE THESE PROBLEMS MADE IT FOR YOU TO DO YOUR WORK, TAKE CARE OF THINGS AT HOME, OR GET ALONG WITH OTHER PEOPLE: VERY DIFFICULT
SUM OF ALL RESPONSES TO PHQ QUESTIONS 1-9: 8

## 2025-08-27 ASSESSMENT — ANXIETY QUESTIONNAIRES
2. NOT BEING ABLE TO STOP OR CONTROL WORRYING: MORE THAN HALF THE DAYS
GAD7 TOTAL SCORE: 10
7. FEELING AFRAID AS IF SOMETHING AWFUL MIGHT HAPPEN: MORE THAN HALF THE DAYS
3. WORRYING TOO MUCH ABOUT DIFFERENT THINGS: MORE THAN HALF THE DAYS
7. FEELING AFRAID AS IF SOMETHING AWFUL MIGHT HAPPEN: MORE THAN HALF THE DAYS
IF YOU CHECKED OFF ANY PROBLEMS ON THIS QUESTIONNAIRE, HOW DIFFICULT HAVE THESE PROBLEMS MADE IT FOR YOU TO DO YOUR WORK, TAKE CARE OF THINGS AT HOME, OR GET ALONG WITH OTHER PEOPLE: VERY DIFFICULT
8. IF YOU CHECKED OFF ANY PROBLEMS, HOW DIFFICULT HAVE THESE MADE IT FOR YOU TO DO YOUR WORK, TAKE CARE OF THINGS AT HOME, OR GET ALONG WITH OTHER PEOPLE?: VERY DIFFICULT
6. BECOMING EASILY ANNOYED OR IRRITABLE: SEVERAL DAYS
1. FEELING NERVOUS, ANXIOUS, OR ON EDGE: MORE THAN HALF THE DAYS
5. BEING SO RESTLESS THAT IT IS HARD TO SIT STILL: NOT AT ALL
GAD7 TOTAL SCORE: 10
GAD7 TOTAL SCORE: 10
4. TROUBLE RELAXING: SEVERAL DAYS

## 2025-08-28 ENCOUNTER — PATIENT OUTREACH (OUTPATIENT)
Dept: CARE COORDINATION | Facility: CLINIC | Age: 70
End: 2025-08-28
Payer: COMMERCIAL

## 2025-09-01 ENCOUNTER — PATIENT OUTREACH (OUTPATIENT)
Dept: CARE COORDINATION | Facility: CLINIC | Age: 70
End: 2025-09-01
Payer: COMMERCIAL

## 2025-09-02 ENCOUNTER — NURSE TRIAGE (OUTPATIENT)
Dept: FAMILY MEDICINE | Facility: CLINIC | Age: 70
End: 2025-09-02
Payer: COMMERCIAL

## 2025-09-02 DIAGNOSIS — F41.9 ANXIETY: Primary | ICD-10-CM

## 2025-09-02 RX ORDER — ALPRAZOLAM 0.25 MG
0.25 TABLET ORAL
Qty: 30 TABLET | Refills: 0 | Status: CANCELLED | OUTPATIENT
Start: 2025-09-02

## 2025-09-03 ENCOUNTER — VIRTUAL VISIT (OUTPATIENT)
Dept: BEHAVIORAL HEALTH | Facility: CLINIC | Age: 70
End: 2025-09-03
Payer: COMMERCIAL

## 2025-09-03 DIAGNOSIS — F43.21 ADJUSTMENT DISORDER WITH DEPRESSED MOOD: Primary | ICD-10-CM

## 2025-09-03 PROCEDURE — 90837 PSYTX W PT 60 MINUTES: CPT | Mod: 95 | Performed by: SOCIAL WORKER

## 2025-09-03 RX ORDER — HYDROXYZINE HYDROCHLORIDE 25 MG/1
25 TABLET, FILM COATED ORAL 3 TIMES DAILY PRN
Qty: 30 TABLET | Refills: 2 | Status: SHIPPED | OUTPATIENT
Start: 2025-09-03

## 2025-09-03 ASSESSMENT — PATIENT HEALTH QUESTIONNAIRE - PHQ9
SUM OF ALL RESPONSES TO PHQ QUESTIONS 1-9: 10
10. IF YOU CHECKED OFF ANY PROBLEMS, HOW DIFFICULT HAVE THESE PROBLEMS MADE IT FOR YOU TO DO YOUR WORK, TAKE CARE OF THINGS AT HOME, OR GET ALONG WITH OTHER PEOPLE: VERY DIFFICULT
SUM OF ALL RESPONSES TO PHQ QUESTIONS 1-9: 10

## 2025-09-04 ENCOUNTER — PATIENT OUTREACH (OUTPATIENT)
Dept: CARE COORDINATION | Facility: CLINIC | Age: 70
End: 2025-09-04
Payer: COMMERCIAL

## (undated) DEVICE — LINEN TOWEL PACK X5 5464

## (undated) DEVICE — DRSG GAUZE 4X4" 3033

## (undated) DEVICE — SU VICRYL 3-0 FS-1 27" J442H

## (undated) DEVICE — EYE CANN IRR 27GA ANTERIOR CHAMBER 581280

## (undated) DEVICE — SOL WATER IRRIG 1000ML BOTTLE 2F7114

## (undated) DEVICE — GLOVE PROTEXIS BLUE W/NEU-THERA 8.0  2D73EB80

## (undated) DEVICE — SU MONOCRYL 4-0 PS-2 18" UND Y496G

## (undated) DEVICE — DECANTER BAG 2002S

## (undated) DEVICE — DRSG PRIMAPORE 02X3" 7133

## (undated) DEVICE — SU VICRYL 4-0 PS-2 18" UND J496H

## (undated) DEVICE — EYE PACK CUSTOM ANTERIOR 30DEG TIP CENTURION PPK6682-04

## (undated) DEVICE — SPECIMEN CONTAINER W/10% BUFFERED FORMALIN 120ML 591201

## (undated) DEVICE — Device

## (undated) DEVICE — WIPE PREMOIST CLEANSING WASHCLOTHS 7988

## (undated) DEVICE — GLOVE PROTEXIS MICRO 6.5  2D73PM65

## (undated) DEVICE — PACK CATARACT CUSTOM ASC SEY15CPUMC

## (undated) DEVICE — DRSG STERI STRIP 1/4X3" R1541

## (undated) DEVICE — DRSG TEGADERM 4X4 3/4" 1626W

## (undated) DEVICE — GLOVE PROTEXIS POWDER FREE SMT 7.0  2D72PT70X

## (undated) DEVICE — EYE TIP IRRIGATION & ASPIRATION POLYMER CVD 0.3MM 8065751512

## (undated) DEVICE — SOL NACL 0.9% IRRIG 500ML BOTTLE 2F7123

## (undated) DEVICE — GLOVE PROTEXIS W/NEU-THERA 7.5  2D73TE75

## (undated) DEVICE — EYE SOL BSS 500ML BAG 0065-1795-04

## (undated) DEVICE — WIPES FOLEY CARE SURESTEP PROVON DFC100

## (undated) DEVICE — SU ETHILON 2-0 PS 18" 585H

## (undated) DEVICE — DRAPE GYN/UROLOGY FLUID POUCH TUR 29455

## (undated) DEVICE — EYE KNIFE STILETTO VISITEC 1.1MM ANG 45DEG SIDEPORT 376620

## (undated) DEVICE — SU VICRYL 2-0 CP-2 27" UND J869H

## (undated) DEVICE — GOWN XLG DISP 9545

## (undated) DEVICE — PACK AB HYST II

## (undated) DEVICE — PREP CHLORAPREP 26ML TINTED ORANGE  260815

## (undated) DEVICE — SOL NACL 0.9% IRRIG 1000ML BOTTLE 2F7124

## (undated) DEVICE — DRAPE SHEET MED 44X70" 9355

## (undated) DEVICE — SU VICRYL 2-0 CT-2 27" UND J269H

## (undated) DEVICE — SU MONOCRYL 4-0 P-3 18" UND Y494G

## (undated) DEVICE — SYR BULB IRRIG 50ML LATEX FREE 0035280

## (undated) DEVICE — EYE SHIELD PLASTIC

## (undated) DEVICE — EYE CANN IRR 25GA CYSTOTOME 581610

## (undated) DEVICE — PREP SCRUB CARE CHLOROXYLENOL (PCMX) 4OZ 29902-004

## (undated) DEVICE — EYE KNIFE SLIT XSTAR VISITEC 2.6MM 45DEG 373726

## (undated) DEVICE — SU PDS II 0 TP-1 60" Z991G

## (undated) DEVICE — SU VICRYL 2-0 TIE 54" J615H

## (undated) DEVICE — PAD CHUX UNDERPAD 30X30"

## (undated) DEVICE — SU DERMABOND PRINEO CLR602US

## (undated) DEVICE — PAD PERI INDIV WRAP 11" 2022A

## (undated) DEVICE — SPONGE LAP 18X36" 422

## (undated) DEVICE — LINEN TOWEL PACK X30 5481

## (undated) DEVICE — DRAPE LEGGINGS CLEAR 8430

## (undated) DEVICE — SU VICRYL 3-0 SH 27" UND J416H

## (undated) DEVICE — PACK MINOR CUSTOM ASC

## (undated) DEVICE — GLOVE PROTEXIS POWDER FREE SMT 6.5  2D72PT65X

## (undated) DEVICE — LINEN GOWN XLG 5407

## (undated) DEVICE — GLOVE PROTEXIS POWDER FREE SMT 7.5  2D72PT75X

## (undated) DEVICE — PANTIES MESH LG/XLG 2PK 706M2

## (undated) DEVICE — ADH SKIN CLOSURE PREMIERPRO EXOFIN 1.0ML 3470

## (undated) DEVICE — SU VICRYL 0 CT-1 36" J346H

## (undated) DEVICE — BLADE CLIPPER SGL USE 9680

## (undated) DEVICE — SOL NACL 0.9% INJ 250ML BAG 2B1322Q

## (undated) DEVICE — GLOVE PROTEXIS BLUE W/NEU-THERA 7.0  2D73EB70

## (undated) DEVICE — PACK CENTRAL LINE INSERTION SAN32CLFCG

## (undated) DEVICE — KNIFE HANDLE W/15 BLADE 371615

## (undated) DEVICE — LINEN TOWEL PACK X6 WHITE 5487

## (undated) RX ORDER — LIDOCAINE HYDROCHLORIDE 20 MG/ML
JELLY TOPICAL
Status: DISPENSED
Start: 2021-10-08

## (undated) RX ORDER — LIDOCAINE HYDROCHLORIDE 20 MG/ML
INJECTION, SOLUTION EPIDURAL; INFILTRATION; INTRACAUDAL; PERINEURAL
Status: DISPENSED
Start: 2018-05-18

## (undated) RX ORDER — FENTANYL CITRATE 50 UG/ML
INJECTION, SOLUTION INTRAMUSCULAR; INTRAVENOUS
Status: DISPENSED
Start: 2017-07-07

## (undated) RX ORDER — DEXAMETHASONE SODIUM PHOSPHATE 4 MG/ML
INJECTION, SOLUTION INTRA-ARTICULAR; INTRALESIONAL; INTRAMUSCULAR; INTRAVENOUS; SOFT TISSUE
Status: DISPENSED
Start: 2018-05-18

## (undated) RX ORDER — ONDANSETRON 2 MG/ML
INJECTION INTRAMUSCULAR; INTRAVENOUS
Status: DISPENSED
Start: 2017-07-07

## (undated) RX ORDER — PROPOFOL 10 MG/ML
INJECTION, EMULSION INTRAVENOUS
Status: DISPENSED
Start: 2018-07-13

## (undated) RX ORDER — LIDOCAINE HYDROCHLORIDE 10 MG/ML
INJECTION, SOLUTION INFILTRATION; PERINEURAL
Status: DISPENSED
Start: 2021-10-06

## (undated) RX ORDER — ONDANSETRON 2 MG/ML
INJECTION INTRAMUSCULAR; INTRAVENOUS
Status: DISPENSED
Start: 2017-08-21

## (undated) RX ORDER — ACETAMINOPHEN 325 MG/1
TABLET ORAL
Status: DISPENSED
Start: 2017-08-21

## (undated) RX ORDER — HEPARIN SODIUM 1000 [USP'U]/ML
INJECTION, SOLUTION INTRAVENOUS; SUBCUTANEOUS
Status: DISPENSED
Start: 2017-07-07

## (undated) RX ORDER — HYDROMORPHONE HYDROCHLORIDE 1 MG/ML
INJECTION, SOLUTION INTRAMUSCULAR; INTRAVENOUS; SUBCUTANEOUS
Status: DISPENSED
Start: 2017-07-07

## (undated) RX ORDER — SODIUM CHLORIDE, SODIUM LACTATE, POTASSIUM CHLORIDE, CALCIUM CHLORIDE 600; 310; 30; 20 MG/100ML; MG/100ML; MG/100ML; MG/100ML
INJECTION, SOLUTION INTRAVENOUS
Status: DISPENSED
Start: 2017-07-07

## (undated) RX ORDER — ALBUMIN, HUMAN INJ 5% 5 %
SOLUTION INTRAVENOUS
Status: DISPENSED
Start: 2017-07-07

## (undated) RX ORDER — ACETAMINOPHEN 325 MG/1
TABLET ORAL
Status: DISPENSED
Start: 2018-07-13

## (undated) RX ORDER — ONDANSETRON 2 MG/ML
INJECTION INTRAMUSCULAR; INTRAVENOUS
Status: DISPENSED
Start: 2018-07-13

## (undated) RX ORDER — ONDANSETRON 2 MG/ML
INJECTION INTRAMUSCULAR; INTRAVENOUS
Status: DISPENSED
Start: 2018-05-18

## (undated) RX ORDER — PROPOFOL 10 MG/ML
INJECTION, EMULSION INTRAVENOUS
Status: DISPENSED
Start: 2017-07-07

## (undated) RX ORDER — DEXAMETHASONE SODIUM PHOSPHATE 4 MG/ML
INJECTION, SOLUTION INTRA-ARTICULAR; INTRALESIONAL; INTRAMUSCULAR; INTRAVENOUS; SOFT TISSUE
Status: DISPENSED
Start: 2017-08-21

## (undated) RX ORDER — CEFAZOLIN SODIUM 1 G/3ML
INJECTION, POWDER, FOR SOLUTION INTRAMUSCULAR; INTRAVENOUS
Status: DISPENSED
Start: 2017-07-07

## (undated) RX ORDER — ACETAMINOPHEN 325 MG/1
TABLET ORAL
Status: DISPENSED
Start: 2018-05-18

## (undated) RX ORDER — FENTANYL CITRATE 50 UG/ML
INJECTION, SOLUTION INTRAMUSCULAR; INTRAVENOUS
Status: DISPENSED
Start: 2018-05-18

## (undated) RX ORDER — LIDOCAINE HYDROCHLORIDE 20 MG/ML
JELLY TOPICAL
Status: DISPENSED
Start: 2021-11-19

## (undated) RX ORDER — ALBUTEROL SULFATE 90 UG/1
AEROSOL, METERED RESPIRATORY (INHALATION)
Status: DISPENSED
Start: 2017-07-07

## (undated) RX ORDER — DEXAMETHASONE SODIUM PHOSPHATE 4 MG/ML
INJECTION, SOLUTION INTRA-ARTICULAR; INTRALESIONAL; INTRAMUSCULAR; INTRAVENOUS; SOFT TISSUE
Status: DISPENSED
Start: 2017-07-07

## (undated) RX ORDER — PROPOFOL 10 MG/ML
INJECTION, EMULSION INTRAVENOUS
Status: DISPENSED
Start: 2018-05-18

## (undated) RX ORDER — LIDOCAINE HYDROCHLORIDE 20 MG/ML
INJECTION, SOLUTION EPIDURAL; INFILTRATION; INTRACAUDAL; PERINEURAL
Status: DISPENSED
Start: 2018-07-13

## (undated) RX ORDER — LIDOCAINE HYDROCHLORIDE AND EPINEPHRINE 10; 10 MG/ML; UG/ML
INJECTION, SOLUTION INFILTRATION; PERINEURAL
Status: DISPENSED
Start: 2021-12-06

## (undated) RX ORDER — HEPARIN SODIUM (PORCINE) LOCK FLUSH IV SOLN 100 UNIT/ML 100 UNIT/ML
SOLUTION INTRAVENOUS
Status: DISPENSED
Start: 2018-05-18

## (undated) RX ORDER — FENTANYL CITRATE 50 UG/ML
INJECTION, SOLUTION INTRAMUSCULAR; INTRAVENOUS
Status: DISPENSED
Start: 2018-07-13

## (undated) RX ORDER — HEPARIN SODIUM (PORCINE) LOCK FLUSH IV SOLN 100 UNIT/ML 100 UNIT/ML
SOLUTION INTRAVENOUS
Status: DISPENSED
Start: 2018-07-13

## (undated) RX ORDER — PHENYLEPHRINE HCL IN 0.9% NACL 1 MG/10 ML
SYRINGE (ML) INTRAVENOUS
Status: DISPENSED
Start: 2017-07-07

## (undated) RX ORDER — GABAPENTIN 300 MG/1
CAPSULE ORAL
Status: DISPENSED
Start: 2017-08-21

## (undated) RX ORDER — PROPOFOL 10 MG/ML
INJECTION, EMULSION INTRAVENOUS
Status: DISPENSED
Start: 2017-08-21